# Patient Record
Sex: MALE | Race: WHITE | Employment: OTHER | URBAN - METROPOLITAN AREA
[De-identification: names, ages, dates, MRNs, and addresses within clinical notes are randomized per-mention and may not be internally consistent; named-entity substitution may affect disease eponyms.]

---

## 2017-07-17 ENCOUNTER — HOSPITAL ENCOUNTER (INPATIENT)
Age: 52
LOS: 9 days | Discharge: HOME HEALTH | DRG: 292 | End: 2017-07-26
Attending: EMERGENCY MEDICINE | Admitting: INTERNAL MEDICINE
Payer: MEDICARE

## 2017-07-17 ENCOUNTER — APPOINTMENT (OUTPATIENT)
Dept: GENERAL RADIOLOGY | Age: 52
DRG: 292 | End: 2017-07-17
Payer: MEDICARE

## 2017-07-17 DIAGNOSIS — R60.9 PERIPHERAL EDEMA: Primary | ICD-10-CM

## 2017-07-17 LAB
ABSOLUTE EOS #: 0.2 K/UL (ref 0–0.4)
ABSOLUTE LYMPH #: 0.7 K/UL (ref 1–4.8)
ABSOLUTE MONO #: 0.4 K/UL (ref 0.2–0.8)
ANION GAP SERPL CALCULATED.3IONS-SCNC: 13 MMOL/L (ref 9–17)
BASOPHILS # BLD: 0 %
BASOPHILS ABSOLUTE: 0 K/UL (ref 0–0.2)
BNP INTERPRETATION: ABNORMAL
BUN BLDV-MCNC: 27 MG/DL (ref 6–20)
BUN/CREAT BLD: 20 (ref 9–20)
CALCIUM SERPL-MCNC: 9.4 MG/DL (ref 8.6–10.4)
CHLORIDE BLD-SCNC: 87 MMOL/L (ref 98–107)
CO2: 30 MMOL/L (ref 20–31)
CREAT SERPL-MCNC: 1.37 MG/DL (ref 0.7–1.2)
DIFFERENTIAL TYPE: ABNORMAL
EOSINOPHILS RELATIVE PERCENT: 3 %
GFR AFRICAN AMERICAN: >60 ML/MIN
GFR NON-AFRICAN AMERICAN: 54 ML/MIN
GFR SERPL CREATININE-BSD FRML MDRD: ABNORMAL ML/MIN/{1.73_M2}
GFR SERPL CREATININE-BSD FRML MDRD: ABNORMAL ML/MIN/{1.73_M2}
GLUCOSE BLD-MCNC: 129 MG/DL (ref 70–99)
GLUCOSE BLD-MCNC: 135 MG/DL (ref 75–110)
GLUCOSE BLD-MCNC: 157 MG/DL (ref 75–110)
HCT VFR BLD CALC: 38.6 % (ref 41–53)
HEMOGLOBIN: 12.9 G/DL (ref 13.5–17.5)
LYMPHOCYTES # BLD: 10 %
MCH RBC QN AUTO: 30 PG (ref 26–34)
MCHC RBC AUTO-ENTMCNC: 33.5 G/DL (ref 31–37)
MCV RBC AUTO: 89.5 FL (ref 80–100)
MONOCYTES # BLD: 6 %
MYOGLOBIN: 55 NG/ML (ref 28–72)
MYOGLOBIN: 61 NG/ML (ref 28–72)
MYOGLOBIN: 84 NG/ML (ref 28–72)
PDW BLD-RTO: 14.5 % (ref 11.5–14.5)
PLATELET # BLD: 139 K/UL (ref 130–400)
PLATELET ESTIMATE: ABNORMAL
PMV BLD AUTO: 7.9 FL (ref 6–12)
POTASSIUM SERPL-SCNC: 4.5 MMOL/L (ref 3.7–5.3)
PRO-BNP: 475 PG/ML
RBC # BLD: 4.32 M/UL (ref 4.5–5.9)
RBC # BLD: ABNORMAL 10*6/UL
SEG NEUTROPHILS: 81 %
SEGMENTED NEUTROPHILS ABSOLUTE COUNT: 6 K/UL (ref 1.8–7.7)
SODIUM BLD-SCNC: 130 MMOL/L (ref 135–144)
TROPONIN INTERP: ABNORMAL
TROPONIN INTERP: NORMAL
TROPONIN INTERP: NORMAL
TROPONIN T: <0.03 NG/ML
WBC # BLD: 7.4 K/UL (ref 3.5–11)
WBC # BLD: ABNORMAL 10*3/UL

## 2017-07-17 PROCEDURE — 6360000002 HC RX W HCPCS: Performed by: EMERGENCY MEDICINE

## 2017-07-17 PROCEDURE — 36415 COLL VENOUS BLD VENIPUNCTURE: CPT

## 2017-07-17 PROCEDURE — 83874 ASSAY OF MYOGLOBIN: CPT

## 2017-07-17 PROCEDURE — 96375 TX/PRO/DX INJ NEW DRUG ADDON: CPT

## 2017-07-17 PROCEDURE — 2580000003 HC RX 258: Performed by: INTERNAL MEDICINE

## 2017-07-17 PROCEDURE — 82947 ASSAY GLUCOSE BLOOD QUANT: CPT

## 2017-07-17 PROCEDURE — 85025 COMPLETE CBC W/AUTO DIFF WBC: CPT

## 2017-07-17 PROCEDURE — 6370000000 HC RX 637 (ALT 250 FOR IP): Performed by: EMERGENCY MEDICINE

## 2017-07-17 PROCEDURE — 96374 THER/PROPH/DIAG INJ IV PUSH: CPT

## 2017-07-17 PROCEDURE — 83880 ASSAY OF NATRIURETIC PEPTIDE: CPT

## 2017-07-17 PROCEDURE — 93005 ELECTROCARDIOGRAM TRACING: CPT

## 2017-07-17 PROCEDURE — 84484 ASSAY OF TROPONIN QUANT: CPT

## 2017-07-17 PROCEDURE — 80048 BASIC METABOLIC PNL TOTAL CA: CPT

## 2017-07-17 PROCEDURE — 99285 EMERGENCY DEPT VISIT HI MDM: CPT

## 2017-07-17 PROCEDURE — 51702 INSERT TEMP BLADDER CATH: CPT

## 2017-07-17 PROCEDURE — 96376 TX/PRO/DX INJ SAME DRUG ADON: CPT

## 2017-07-17 PROCEDURE — 6360000002 HC RX W HCPCS: Performed by: INTERNAL MEDICINE

## 2017-07-17 PROCEDURE — 2060000000 HC ICU INTERMEDIATE R&B

## 2017-07-17 PROCEDURE — 96372 THER/PROPH/DIAG INJ SC/IM: CPT

## 2017-07-17 PROCEDURE — 6370000000 HC RX 637 (ALT 250 FOR IP): Performed by: INTERNAL MEDICINE

## 2017-07-17 PROCEDURE — 2500000003 HC RX 250 WO HCPCS: Performed by: NURSE PRACTITIONER

## 2017-07-17 PROCEDURE — 71010 XR CHEST PORTABLE: CPT

## 2017-07-17 RX ORDER — PANTOPRAZOLE SODIUM 40 MG/1
40 TABLET, DELAYED RELEASE ORAL
Status: DISCONTINUED | OUTPATIENT
Start: 2017-07-18 | End: 2017-07-17

## 2017-07-17 RX ORDER — POTASSIUM CHLORIDE 20 MEQ/1
40 TABLET, EXTENDED RELEASE ORAL PRN
Status: DISCONTINUED | OUTPATIENT
Start: 2017-07-17 | End: 2017-07-26 | Stop reason: HOSPADM

## 2017-07-17 RX ORDER — NADOLOL 40 MG/1
40 TABLET ORAL DAILY
Status: ON HOLD | COMMUNITY
End: 2021-10-29 | Stop reason: HOSPADM

## 2017-07-17 RX ORDER — CLONAZEPAM 0.5 MG/1
1 TABLET ORAL 2 TIMES DAILY PRN
Status: DISCONTINUED | OUTPATIENT
Start: 2017-07-17 | End: 2017-07-26 | Stop reason: HOSPADM

## 2017-07-17 RX ORDER — LANOLIN ALCOHOL/MO/W.PET/CERES
325 CREAM (GRAM) TOPICAL 2 TIMES DAILY WITH MEALS
Status: ON HOLD | COMMUNITY
End: 2017-07-26 | Stop reason: HOSPADM

## 2017-07-17 RX ORDER — BISACODYL 10 MG
10 SUPPOSITORY, RECTAL RECTAL DAILY PRN
Status: DISCONTINUED | OUTPATIENT
Start: 2017-07-17 | End: 2017-07-26 | Stop reason: HOSPADM

## 2017-07-17 RX ORDER — NITROGLYCERIN 0.4 MG/1
0.4 TABLET SUBLINGUAL EVERY 5 MIN PRN
Status: DISCONTINUED | OUTPATIENT
Start: 2017-07-17 | End: 2017-07-17 | Stop reason: SDUPTHER

## 2017-07-17 RX ORDER — NICOTINE POLACRILEX 4 MG
15 LOZENGE BUCCAL PRN
Status: DISCONTINUED | OUTPATIENT
Start: 2017-07-17 | End: 2017-07-26 | Stop reason: HOSPADM

## 2017-07-17 RX ORDER — LEVOTHYROXINE SODIUM 175 UG/1
175 TABLET ORAL DAILY
COMMUNITY

## 2017-07-17 RX ORDER — PANTOPRAZOLE SODIUM 40 MG/1
40 TABLET, DELAYED RELEASE ORAL 2 TIMES DAILY
Status: DISCONTINUED | OUTPATIENT
Start: 2017-07-17 | End: 2017-07-26 | Stop reason: HOSPADM

## 2017-07-17 RX ORDER — BUMETANIDE 1 MG/1
2 TABLET ORAL 2 TIMES DAILY
Status: ON HOLD | COMMUNITY
End: 2017-12-08

## 2017-07-17 RX ORDER — DOCUSATE SODIUM 100 MG/1
100 CAPSULE, LIQUID FILLED ORAL 2 TIMES DAILY
Status: DISCONTINUED | OUTPATIENT
Start: 2017-07-17 | End: 2017-07-26 | Stop reason: HOSPADM

## 2017-07-17 RX ORDER — MORPHINE SULFATE 4 MG/ML
4 INJECTION, SOLUTION INTRAMUSCULAR; INTRAVENOUS ONCE
Status: COMPLETED | OUTPATIENT
Start: 2017-07-17 | End: 2017-07-17

## 2017-07-17 RX ORDER — LANOLIN ALCOHOL/MO/W.PET/CERES
325 CREAM (GRAM) TOPICAL
Status: DISCONTINUED | OUTPATIENT
Start: 2017-07-18 | End: 2017-07-17

## 2017-07-17 RX ORDER — QUETIAPINE 150 MG/1
150 TABLET, FILM COATED, EXTENDED RELEASE ORAL NIGHTLY
Status: DISCONTINUED | OUTPATIENT
Start: 2017-07-17 | End: 2017-07-26 | Stop reason: HOSPADM

## 2017-07-17 RX ORDER — DULOXETIN HYDROCHLORIDE 60 MG/1
60 CAPSULE, DELAYED RELEASE ORAL DAILY
Status: DISCONTINUED | OUTPATIENT
Start: 2017-07-17 | End: 2017-07-26 | Stop reason: HOSPADM

## 2017-07-17 RX ORDER — SODIUM CHLORIDE 0.9 % (FLUSH) 0.9 %
10 SYRINGE (ML) INJECTION EVERY 12 HOURS SCHEDULED
Status: DISCONTINUED | OUTPATIENT
Start: 2017-07-17 | End: 2017-07-26 | Stop reason: HOSPADM

## 2017-07-17 RX ORDER — PREGABALIN 75 MG/1
150 CAPSULE ORAL 2 TIMES DAILY
Status: DISCONTINUED | OUTPATIENT
Start: 2017-07-17 | End: 2017-07-26 | Stop reason: HOSPADM

## 2017-07-17 RX ORDER — ONDANSETRON 2 MG/ML
4 INJECTION INTRAMUSCULAR; INTRAVENOUS EVERY 6 HOURS PRN
Status: DISCONTINUED | OUTPATIENT
Start: 2017-07-17 | End: 2017-07-26 | Stop reason: HOSPADM

## 2017-07-17 RX ORDER — FUROSEMIDE 10 MG/ML
40 INJECTION INTRAMUSCULAR; INTRAVENOUS ONCE
Status: COMPLETED | OUTPATIENT
Start: 2017-07-17 | End: 2017-07-17

## 2017-07-17 RX ORDER — DEXTROSE MONOHYDRATE 50 MG/ML
100 INJECTION, SOLUTION INTRAVENOUS PRN
Status: DISCONTINUED | OUTPATIENT
Start: 2017-07-17 | End: 2017-07-26 | Stop reason: HOSPADM

## 2017-07-17 RX ORDER — POTASSIUM CHLORIDE 20MEQ/15ML
40 LIQUID (ML) ORAL PRN
Status: DISCONTINUED | OUTPATIENT
Start: 2017-07-17 | End: 2017-07-26 | Stop reason: HOSPADM

## 2017-07-17 RX ORDER — ESOMEPRAZOLE MAGNESIUM 40 MG/1
40 FOR SUSPENSION ORAL DAILY
Status: ON HOLD | COMMUNITY
End: 2017-07-17 | Stop reason: CLARIF

## 2017-07-17 RX ORDER — PIOGLITAZONEHYDROCHLORIDE 30 MG/1
30 TABLET ORAL DAILY
Status: DISCONTINUED | OUTPATIENT
Start: 2017-07-17 | End: 2017-07-18

## 2017-07-17 RX ORDER — ACETAMINOPHEN 325 MG/1
650 TABLET ORAL EVERY 4 HOURS PRN
Status: DISCONTINUED | OUTPATIENT
Start: 2017-07-17 | End: 2017-07-26 | Stop reason: HOSPADM

## 2017-07-17 RX ORDER — OXYCODONE HYDROCHLORIDE 5 MG/1
10 TABLET ORAL EVERY 6 HOURS PRN
Status: DISCONTINUED | OUTPATIENT
Start: 2017-07-17 | End: 2017-07-26 | Stop reason: HOSPADM

## 2017-07-17 RX ORDER — LANOLIN ALCOHOL/MO/W.PET/CERES
325 CREAM (GRAM) TOPICAL 2 TIMES DAILY WITH MEALS
Status: DISCONTINUED | OUTPATIENT
Start: 2017-07-17 | End: 2017-07-26 | Stop reason: HOSPADM

## 2017-07-17 RX ORDER — SPIRONOLACTONE 25 MG/1
25 TABLET ORAL 2 TIMES DAILY
Status: DISCONTINUED | OUTPATIENT
Start: 2017-07-18 | End: 2017-07-26 | Stop reason: HOSPADM

## 2017-07-17 RX ORDER — SPIRONOLACTONE 50 MG/1
50 TABLET, FILM COATED ORAL 2 TIMES DAILY
Status: ON HOLD | COMMUNITY
End: 2017-12-08 | Stop reason: DRUGHIGH

## 2017-07-17 RX ORDER — MAGNESIUM SULFATE 1 G/100ML
1 INJECTION INTRAVENOUS PRN
Status: DISCONTINUED | OUTPATIENT
Start: 2017-07-17 | End: 2017-07-26 | Stop reason: HOSPADM

## 2017-07-17 RX ORDER — INSULIN GLARGINE 100 [IU]/ML
30 INJECTION, SOLUTION SUBCUTANEOUS DAILY
Status: DISCONTINUED | OUTPATIENT
Start: 2017-07-17 | End: 2017-07-26 | Stop reason: HOSPADM

## 2017-07-17 RX ORDER — POTASSIUM CHLORIDE 7.45 MG/ML
10 INJECTION INTRAVENOUS PRN
Status: DISCONTINUED | OUTPATIENT
Start: 2017-07-17 | End: 2017-07-26 | Stop reason: HOSPADM

## 2017-07-17 RX ORDER — ESOMEPRAZOLE MAGNESIUM 40 MG/1
40 FOR SUSPENSION ORAL DAILY
Status: DISCONTINUED | OUTPATIENT
Start: 2017-07-17 | End: 2017-07-17 | Stop reason: SDUPTHER

## 2017-07-17 RX ORDER — VENLAFAXINE HYDROCHLORIDE 75 MG/1
150 CAPSULE, EXTENDED RELEASE ORAL DAILY
Status: DISCONTINUED | OUTPATIENT
Start: 2017-07-17 | End: 2017-07-26 | Stop reason: HOSPADM

## 2017-07-17 RX ORDER — SODIUM CHLORIDE 0.9 % (FLUSH) 0.9 %
10 SYRINGE (ML) INJECTION PRN
Status: DISCONTINUED | OUTPATIENT
Start: 2017-07-17 | End: 2017-07-26 | Stop reason: HOSPADM

## 2017-07-17 RX ORDER — NYSTATIN 100000 [USP'U]/G
POWDER TOPICAL 2 TIMES DAILY
Status: ON HOLD | COMMUNITY
End: 2022-01-21 | Stop reason: HOSPADM

## 2017-07-17 RX ORDER — NITROGLYCERIN 0.4 MG/1
0.4 TABLET SUBLINGUAL EVERY 5 MIN PRN
Status: DISCONTINUED | OUTPATIENT
Start: 2017-07-17 | End: 2017-07-26 | Stop reason: HOSPADM

## 2017-07-17 RX ORDER — LEVOTHYROXINE SODIUM 137 UG/1
137 TABLET ORAL DAILY
Status: DISCONTINUED | OUTPATIENT
Start: 2017-07-17 | End: 2017-07-17

## 2017-07-17 RX ORDER — DEXTROSE MONOHYDRATE 25 G/50ML
12.5 INJECTION, SOLUTION INTRAVENOUS PRN
Status: DISCONTINUED | OUTPATIENT
Start: 2017-07-17 | End: 2017-07-26 | Stop reason: HOSPADM

## 2017-07-17 RX ORDER — ASPIRIN 81 MG/1
81 TABLET ORAL DAILY
Status: DISCONTINUED | OUTPATIENT
Start: 2017-07-17 | End: 2017-07-26 | Stop reason: HOSPADM

## 2017-07-17 RX ORDER — FUROSEMIDE 10 MG/ML
40 INJECTION INTRAMUSCULAR; INTRAVENOUS 2 TIMES DAILY
Status: DISCONTINUED | OUTPATIENT
Start: 2017-07-17 | End: 2017-07-19

## 2017-07-17 RX ORDER — TAMSULOSIN HYDROCHLORIDE 0.4 MG/1
0.4 CAPSULE ORAL DAILY
Status: DISCONTINUED | OUTPATIENT
Start: 2017-07-17 | End: 2017-07-26 | Stop reason: HOSPADM

## 2017-07-17 RX ORDER — LISINOPRIL 2.5 MG/1
2.5 TABLET ORAL DAILY
Status: DISCONTINUED | OUTPATIENT
Start: 2017-07-18 | End: 2017-07-17 | Stop reason: SINTOL

## 2017-07-17 RX ORDER — LEVOTHYROXINE SODIUM 0.15 MG/1
150 TABLET ORAL DAILY
Status: DISCONTINUED | OUTPATIENT
Start: 2017-07-18 | End: 2017-07-26 | Stop reason: HOSPADM

## 2017-07-17 RX ORDER — SPIRONOLACTONE 25 MG/1
25 TABLET ORAL DAILY
Status: DISCONTINUED | OUTPATIENT
Start: 2017-07-17 | End: 2017-07-17

## 2017-07-17 RX ORDER — PIOGLITAZONEHYDROCHLORIDE 30 MG/1
30 TABLET ORAL DAILY
Status: ON HOLD | COMMUNITY
End: 2017-07-26 | Stop reason: HOSPADM

## 2017-07-17 RX ORDER — NADOLOL 20 MG/1
40 TABLET ORAL DAILY
Status: DISCONTINUED | OUTPATIENT
Start: 2017-07-17 | End: 2017-07-23

## 2017-07-17 RX ORDER — HEPARIN SODIUM 5000 [USP'U]/ML
5000 INJECTION, SOLUTION INTRAVENOUS; SUBCUTANEOUS EVERY 8 HOURS SCHEDULED
Status: DISCONTINUED | OUTPATIENT
Start: 2017-07-17 | End: 2017-07-26 | Stop reason: HOSPADM

## 2017-07-17 RX ADMIN — PANTOPRAZOLE SODIUM 40 MG: 40 TABLET, DELAYED RELEASE ORAL at 21:27

## 2017-07-17 RX ADMIN — FUROSEMIDE 40 MG: 10 INJECTION, SOLUTION INTRAMUSCULAR; INTRAVENOUS at 21:27

## 2017-07-17 RX ADMIN — HEPARIN SODIUM 5000 UNITS: 5000 INJECTION, SOLUTION INTRAVENOUS; SUBCUTANEOUS at 13:37

## 2017-07-17 RX ADMIN — FUROSEMIDE 40 MG: 10 INJECTION, SOLUTION INTRAMUSCULAR; INTRAVENOUS at 16:05

## 2017-07-17 RX ADMIN — Medication 10 ML: at 21:29

## 2017-07-17 RX ADMIN — INSULIN GLARGINE 30 UNITS: 100 INJECTION, SOLUTION SUBCUTANEOUS at 15:10

## 2017-07-17 RX ADMIN — FUROSEMIDE 40 MG: 10 INJECTION, SOLUTION INTRAMUSCULAR; INTRAVENOUS at 12:00

## 2017-07-17 RX ADMIN — HEPARIN SODIUM 5000 UNITS: 5000 INJECTION, SOLUTION INTRAVENOUS; SUBCUTANEOUS at 21:43

## 2017-07-17 RX ADMIN — TAMSULOSIN HYDROCHLORIDE 0.4 MG: 0.4 CAPSULE ORAL at 21:28

## 2017-07-17 RX ADMIN — OXYCODONE HYDROCHLORIDE 10 MG: 5 TABLET ORAL at 15:09

## 2017-07-17 RX ADMIN — MORPHINE SULFATE 4 MG: 4 INJECTION, SOLUTION INTRAMUSCULAR; INTRAVENOUS at 12:01

## 2017-07-17 RX ADMIN — MICONAZORB AF ANTIFUNGAL POWDER: 1.42 POWDER TOPICAL at 21:27

## 2017-07-17 RX ADMIN — ACETAMINOPHEN 650 MG: 325 TABLET ORAL at 17:10

## 2017-07-17 RX ADMIN — LIDOCAINE HYDROCHLORIDE 5 ML: 20 JELLY TOPICAL at 12:01

## 2017-07-17 RX ADMIN — ASPIRIN 81 MG: 81 TABLET, COATED ORAL at 15:09

## 2017-07-17 RX ADMIN — QUETIAPINE 150 MG: 150 TABLET, EXTENDED RELEASE ORAL at 21:28

## 2017-07-17 RX ADMIN — VENLAFAXINE HYDROCHLORIDE 150 MG: 75 CAPSULE, EXTENDED RELEASE ORAL at 21:28

## 2017-07-17 RX ADMIN — MORPHINE SULFATE 4 MG: 4 INJECTION, SOLUTION INTRAMUSCULAR; INTRAVENOUS at 11:01

## 2017-07-17 RX ADMIN — PREGABALIN 150 MG: 75 CAPSULE ORAL at 15:11

## 2017-07-17 RX ADMIN — PREGABALIN 150 MG: 75 CAPSULE ORAL at 21:27

## 2017-07-17 ASSESSMENT — PAIN SCALES - GENERAL
PAINLEVEL_OUTOF10: 9
PAINLEVEL_OUTOF10: 7
PAINLEVEL_OUTOF10: 9
PAINLEVEL_OUTOF10: 8
PAINLEVEL_OUTOF10: 8
PAINLEVEL_OUTOF10: 9

## 2017-07-17 ASSESSMENT — PAIN DESCRIPTION - PROGRESSION
CLINICAL_PROGRESSION: GRADUALLY IMPROVING
CLINICAL_PROGRESSION: NOT CHANGED
CLINICAL_PROGRESSION: NOT CHANGED

## 2017-07-17 ASSESSMENT — PAIN DESCRIPTION - FREQUENCY
FREQUENCY: CONTINUOUS

## 2017-07-17 ASSESSMENT — PAIN DESCRIPTION - PAIN TYPE
TYPE: ACUTE PAIN;CHRONIC PAIN

## 2017-07-17 ASSESSMENT — ENCOUNTER SYMPTOMS
SHORTNESS OF BREATH: 1
ABDOMINAL PAIN: 0
EYE DISCHARGE: 0
EYE REDNESS: 0
COLOR CHANGE: 0
DIARRHEA: 0
FACIAL SWELLING: 0
VOMITING: 0
CONSTIPATION: 0
COUGH: 0

## 2017-07-17 ASSESSMENT — PAIN DESCRIPTION - LOCATION
LOCATION: LEG

## 2017-07-17 ASSESSMENT — PAIN DESCRIPTION - ORIENTATION
ORIENTATION: RIGHT;LEFT

## 2017-07-17 ASSESSMENT — PAIN DESCRIPTION - DESCRIPTORS
DESCRIPTORS: ACHING;PRESSURE
DESCRIPTORS: ACHING;PRESSURE;DISCOMFORT;SORE
DESCRIPTORS: ACHING;PRESSURE
DESCRIPTORS: ACHING;PRESSURE

## 2017-07-18 ENCOUNTER — APPOINTMENT (OUTPATIENT)
Dept: GENERAL RADIOLOGY | Age: 52
DRG: 292 | End: 2017-07-18
Payer: MEDICARE

## 2017-07-18 PROBLEM — I50.31 ACUTE DIASTOLIC (CONGESTIVE) HEART FAILURE (HCC): Status: ACTIVE | Noted: 2017-07-18

## 2017-07-18 LAB
ALBUMIN SERPL-MCNC: 3.3 G/DL (ref 3.5–5.2)
ALBUMIN/GLOBULIN RATIO: ABNORMAL (ref 1–2.5)
ALP BLD-CCNC: 114 U/L (ref 40–129)
ALT SERPL-CCNC: 12 U/L (ref 5–41)
AMMONIA: 27 UMOL/L (ref 16–60)
ANION GAP SERPL CALCULATED.3IONS-SCNC: 9 MMOL/L (ref 9–17)
AST SERPL-CCNC: 13 U/L
BILIRUB SERPL-MCNC: 0.36 MG/DL (ref 0.3–1.2)
BNP INTERPRETATION: ABNORMAL
BUN BLDV-MCNC: 24 MG/DL (ref 6–20)
BUN/CREAT BLD: 20 (ref 9–20)
CALCIUM SERPL-MCNC: 9.1 MG/DL (ref 8.6–10.4)
CHLORIDE BLD-SCNC: 92 MMOL/L (ref 98–107)
CHOLESTEROL/HDL RATIO: 4.4
CHOLESTEROL: 203 MG/DL
CO2: 37 MMOL/L (ref 20–31)
CREAT SERPL-MCNC: 1.22 MG/DL (ref 0.7–1.2)
EKG ATRIAL RATE: 63 BPM
EKG P AXIS: 54 DEGREES
EKG P-R INTERVAL: 172 MS
EKG Q-T INTERVAL: 418 MS
EKG QRS DURATION: 106 MS
EKG QTC CALCULATION (BAZETT): 427 MS
EKG R AXIS: -15 DEGREES
EKG T AXIS: 4 DEGREES
EKG VENTRICULAR RATE: 63 BPM
GFR AFRICAN AMERICAN: >60 ML/MIN
GFR NON-AFRICAN AMERICAN: >60 ML/MIN
GFR SERPL CREATININE-BSD FRML MDRD: ABNORMAL ML/MIN/{1.73_M2}
GFR SERPL CREATININE-BSD FRML MDRD: ABNORMAL ML/MIN/{1.73_M2}
GLUCOSE BLD-MCNC: 122 MG/DL (ref 75–110)
GLUCOSE BLD-MCNC: 124 MG/DL (ref 70–99)
GLUCOSE BLD-MCNC: 124 MG/DL (ref 75–110)
GLUCOSE BLD-MCNC: 148 MG/DL (ref 75–110)
GLUCOSE BLD-MCNC: 93 MG/DL (ref 75–110)
HCT VFR BLD CALC: 35.6 % (ref 41–53)
HDLC SERPL-MCNC: 46 MG/DL
HEMOGLOBIN: 11.9 G/DL (ref 13.5–17.5)
LDL CHOLESTEROL: 122 MG/DL (ref 0–130)
MAGNESIUM: 1.9 MG/DL (ref 1.6–2.6)
MCH RBC QN AUTO: 29.9 PG (ref 26–34)
MCHC RBC AUTO-ENTMCNC: 33.3 G/DL (ref 31–37)
MCV RBC AUTO: 89.7 FL (ref 80–100)
PDW BLD-RTO: 14.5 % (ref 11.5–14.5)
PLATELET # BLD: 115 K/UL (ref 130–400)
PMV BLD AUTO: 8 FL (ref 6–12)
POTASSIUM SERPL-SCNC: 4.2 MMOL/L (ref 3.7–5.3)
PRO-BNP: 683 PG/ML
RBC # BLD: 3.97 M/UL (ref 4.5–5.9)
SODIUM BLD-SCNC: 138 MMOL/L (ref 135–144)
TOTAL PROTEIN: 6.8 G/DL (ref 6.4–8.3)
TRIGL SERPL-MCNC: 174 MG/DL
TSH SERPL DL<=0.05 MIU/L-ACNC: 3.28 MIU/L (ref 0.3–5)
VLDLC SERPL CALC-MCNC: ABNORMAL MG/DL (ref 1–30)
WBC # BLD: 5.6 K/UL (ref 3.5–11)

## 2017-07-18 PROCEDURE — 99222 1ST HOSP IP/OBS MODERATE 55: CPT | Performed by: INTERNAL MEDICINE

## 2017-07-18 PROCEDURE — 82140 ASSAY OF AMMONIA: CPT

## 2017-07-18 PROCEDURE — 83880 ASSAY OF NATRIURETIC PEPTIDE: CPT

## 2017-07-18 PROCEDURE — 2580000003 HC RX 258: Performed by: INTERNAL MEDICINE

## 2017-07-18 PROCEDURE — 97530 THERAPEUTIC ACTIVITIES: CPT

## 2017-07-18 PROCEDURE — 97166 OT EVAL MOD COMPLEX 45 MIN: CPT

## 2017-07-18 PROCEDURE — 71010 XR CHEST PORTABLE: CPT

## 2017-07-18 PROCEDURE — 97162 PT EVAL MOD COMPLEX 30 MIN: CPT

## 2017-07-18 PROCEDURE — G8988 SELF CARE GOAL STATUS: HCPCS

## 2017-07-18 PROCEDURE — 82947 ASSAY GLUCOSE BLOOD QUANT: CPT

## 2017-07-18 PROCEDURE — G8978 MOBILITY CURRENT STATUS: HCPCS

## 2017-07-18 PROCEDURE — 6370000000 HC RX 637 (ALT 250 FOR IP): Performed by: INTERNAL MEDICINE

## 2017-07-18 PROCEDURE — 84443 ASSAY THYROID STIM HORMONE: CPT

## 2017-07-18 PROCEDURE — 2060000000 HC ICU INTERMEDIATE R&B

## 2017-07-18 PROCEDURE — 36415 COLL VENOUS BLD VENIPUNCTURE: CPT

## 2017-07-18 PROCEDURE — 80053 COMPREHEN METABOLIC PANEL: CPT

## 2017-07-18 PROCEDURE — 85027 COMPLETE CBC AUTOMATED: CPT

## 2017-07-18 PROCEDURE — 6360000002 HC RX W HCPCS: Performed by: INTERNAL MEDICINE

## 2017-07-18 PROCEDURE — G8987 SELF CARE CURRENT STATUS: HCPCS

## 2017-07-18 PROCEDURE — G8979 MOBILITY GOAL STATUS: HCPCS

## 2017-07-18 PROCEDURE — 83735 ASSAY OF MAGNESIUM: CPT

## 2017-07-18 PROCEDURE — 97535 SELF CARE MNGMENT TRAINING: CPT

## 2017-07-18 PROCEDURE — 97116 GAIT TRAINING THERAPY: CPT

## 2017-07-18 PROCEDURE — 80061 LIPID PANEL: CPT

## 2017-07-18 RX ORDER — LISINOPRIL 2.5 MG/1
2.5 TABLET ORAL DAILY
Status: DISCONTINUED | OUTPATIENT
Start: 2017-07-18 | End: 2017-07-24

## 2017-07-18 RX ORDER — DIPHENHYDRAMINE HCL 25 MG
25 TABLET ORAL EVERY 6 HOURS PRN
Status: DISCONTINUED | OUTPATIENT
Start: 2017-07-18 | End: 2017-07-26 | Stop reason: HOSPADM

## 2017-07-18 RX ADMIN — METFORMIN HYDROCHLORIDE 1000 MG: 500 TABLET, FILM COATED ORAL at 17:53

## 2017-07-18 RX ADMIN — QUETIAPINE 150 MG: 150 TABLET, EXTENDED RELEASE ORAL at 21:47

## 2017-07-18 RX ADMIN — MICONAZORB AF ANTIFUNGAL POWDER: 1.42 POWDER TOPICAL at 21:50

## 2017-07-18 RX ADMIN — LISINOPRIL 2.5 MG: 2.5 TABLET ORAL at 17:53

## 2017-07-18 RX ADMIN — HEPARIN SODIUM 5000 UNITS: 5000 INJECTION, SOLUTION INTRAVENOUS; SUBCUTANEOUS at 21:47

## 2017-07-18 RX ADMIN — INSULIN GLARGINE 30 UNITS: 100 INJECTION, SOLUTION SUBCUTANEOUS at 07:44

## 2017-07-18 RX ADMIN — PREGABALIN 150 MG: 75 CAPSULE ORAL at 08:05

## 2017-07-18 RX ADMIN — CLONAZEPAM 1 MG: 0.5 TABLET ORAL at 22:59

## 2017-07-18 RX ADMIN — VENLAFAXINE HYDROCHLORIDE 150 MG: 75 CAPSULE, EXTENDED RELEASE ORAL at 21:47

## 2017-07-18 RX ADMIN — NADOLOL 40 MG: 20 TABLET ORAL at 08:05

## 2017-07-18 RX ADMIN — FERROUS SULFATE TAB EC 325 MG (65 MG FE EQUIVALENT) 325 MG: 325 (65 FE) TABLET DELAYED RESPONSE at 17:53

## 2017-07-18 RX ADMIN — HEPARIN SODIUM 5000 UNITS: 5000 INJECTION, SOLUTION INTRAVENOUS; SUBCUTANEOUS at 06:03

## 2017-07-18 RX ADMIN — ACETAMINOPHEN 650 MG: 325 TABLET ORAL at 08:15

## 2017-07-18 RX ADMIN — SPIRONOLACTONE 25 MG: 25 TABLET, FILM COATED ORAL at 08:02

## 2017-07-18 RX ADMIN — ACETAMINOPHEN 650 MG: 325 TABLET ORAL at 17:53

## 2017-07-18 RX ADMIN — ASPIRIN 81 MG: 81 TABLET, COATED ORAL at 08:00

## 2017-07-18 RX ADMIN — Medication 10 ML: at 21:50

## 2017-07-18 RX ADMIN — DULOXETINE HYDROCHLORIDE 60 MG: 60 CAPSULE, DELAYED RELEASE ORAL at 08:05

## 2017-07-18 RX ADMIN — HEPARIN SODIUM 5000 UNITS: 5000 INJECTION, SOLUTION INTRAVENOUS; SUBCUTANEOUS at 13:44

## 2017-07-18 RX ADMIN — LEVOTHYROXINE SODIUM 150 MCG: 150 TABLET ORAL at 07:44

## 2017-07-18 RX ADMIN — DIPHENHYDRAMINE HCL 25 MG: 25 TABLET ORAL at 12:45

## 2017-07-18 RX ADMIN — METFORMIN HYDROCHLORIDE 1000 MG: 500 TABLET, FILM COATED ORAL at 08:00

## 2017-07-18 RX ADMIN — PANTOPRAZOLE SODIUM 40 MG: 40 TABLET, DELAYED RELEASE ORAL at 08:00

## 2017-07-18 RX ADMIN — PANTOPRAZOLE SODIUM 40 MG: 40 TABLET, DELAYED RELEASE ORAL at 21:47

## 2017-07-18 RX ADMIN — TAMSULOSIN HYDROCHLORIDE 0.4 MG: 0.4 CAPSULE ORAL at 21:46

## 2017-07-18 RX ADMIN — OXYCODONE HYDROCHLORIDE 10 MG: 5 TABLET ORAL at 06:11

## 2017-07-18 RX ADMIN — OXYCODONE HYDROCHLORIDE 10 MG: 5 TABLET ORAL at 12:44

## 2017-07-18 RX ADMIN — DIPHENHYDRAMINE HCL 25 MG: 25 TABLET ORAL at 20:15

## 2017-07-18 RX ADMIN — SPIRONOLACTONE 25 MG: 25 TABLET, FILM COATED ORAL at 17:53

## 2017-07-18 RX ADMIN — PIOGLITAZONE 30 MG: 30 TABLET ORAL at 08:05

## 2017-07-18 RX ADMIN — LINAGLIPTIN 5 MG: 5 TABLET, FILM COATED ORAL at 08:05

## 2017-07-18 RX ADMIN — PREGABALIN 150 MG: 75 CAPSULE ORAL at 21:46

## 2017-07-18 RX ADMIN — FERROUS SULFATE TAB EC 325 MG (65 MG FE EQUIVALENT) 325 MG: 325 (65 FE) TABLET DELAYED RESPONSE at 08:00

## 2017-07-18 RX ADMIN — FUROSEMIDE 40 MG: 10 INJECTION, SOLUTION INTRAMUSCULAR; INTRAVENOUS at 07:51

## 2017-07-18 RX ADMIN — CLONAZEPAM 1 MG: 0.5 TABLET ORAL at 08:12

## 2017-07-18 RX ADMIN — OXYCODONE HYDROCHLORIDE 10 MG: 5 TABLET ORAL at 18:43

## 2017-07-18 RX ADMIN — FUROSEMIDE 40 MG: 10 INJECTION, SOLUTION INTRAMUSCULAR; INTRAVENOUS at 21:48

## 2017-07-18 RX ADMIN — MICONAZORB AF ANTIFUNGAL POWDER: 1.42 POWDER TOPICAL at 11:07

## 2017-07-18 RX ADMIN — Medication 10 ML: at 07:52

## 2017-07-18 ASSESSMENT — PAIN DESCRIPTION - FREQUENCY
FREQUENCY: CONTINUOUS

## 2017-07-18 ASSESSMENT — PAIN DESCRIPTION - DESCRIPTORS
DESCRIPTORS: SORE;ACHING
DESCRIPTORS: ACHING;PRESSURE
DESCRIPTORS: ACHING;PRESSURE
DESCRIPTORS: ACHING
DESCRIPTORS: ACHING;SORE;PRESSURE
DESCRIPTORS: ACHING;PRESSURE

## 2017-07-18 ASSESSMENT — PAIN DESCRIPTION - ONSET
ONSET: ON-GOING
ONSET: PROGRESSIVE
ONSET: ON-GOING

## 2017-07-18 ASSESSMENT — PAIN DESCRIPTION - LOCATION
LOCATION: LEG
LOCATION: LEG
LOCATION: LEG;KNEE
LOCATION: LEG
LOCATION: KNEE
LOCATION: LEG
LOCATION: LEG

## 2017-07-18 ASSESSMENT — PAIN DESCRIPTION - ORIENTATION
ORIENTATION: RIGHT
ORIENTATION: LEFT;RIGHT
ORIENTATION: RIGHT;LEFT
ORIENTATION: LEFT;RIGHT
ORIENTATION: RIGHT;LEFT

## 2017-07-18 ASSESSMENT — PAIN DESCRIPTION - PAIN TYPE
TYPE: ACUTE PAIN;CHRONIC PAIN
TYPE: CHRONIC PAIN
TYPE: ACUTE PAIN;CHRONIC PAIN

## 2017-07-18 ASSESSMENT — PAIN DESCRIPTION - PROGRESSION
CLINICAL_PROGRESSION: NOT CHANGED
CLINICAL_PROGRESSION: GRADUALLY IMPROVING
CLINICAL_PROGRESSION: NOT CHANGED

## 2017-07-18 ASSESSMENT — PAIN SCALES - GENERAL
PAINLEVEL_OUTOF10: 9
PAINLEVEL_OUTOF10: 8
PAINLEVEL_OUTOF10: 9
PAINLEVEL_OUTOF10: 7
PAINLEVEL_OUTOF10: 10
PAINLEVEL_OUTOF10: 9
PAINLEVEL_OUTOF10: 9

## 2017-07-19 ENCOUNTER — ANESTHESIA EVENT (OUTPATIENT)
Dept: OPERATING ROOM | Age: 52
DRG: 292 | End: 2017-07-19
Payer: MEDICARE

## 2017-07-19 ENCOUNTER — APPOINTMENT (OUTPATIENT)
Dept: GENERAL RADIOLOGY | Age: 52
DRG: 292 | End: 2017-07-19
Payer: MEDICARE

## 2017-07-19 ENCOUNTER — ANESTHESIA (OUTPATIENT)
Dept: OPERATING ROOM | Age: 52
DRG: 292 | End: 2017-07-19
Payer: MEDICARE

## 2017-07-19 ENCOUNTER — APPOINTMENT (OUTPATIENT)
Dept: ULTRASOUND IMAGING | Age: 52
DRG: 292 | End: 2017-07-19
Payer: MEDICARE

## 2017-07-19 VITALS — SYSTOLIC BLOOD PRESSURE: 111 MMHG | OXYGEN SATURATION: 100 % | DIASTOLIC BLOOD PRESSURE: 54 MMHG

## 2017-07-19 LAB
ALBUMIN SERPL-MCNC: 3 G/DL (ref 3.5–5.2)
ALBUMIN/GLOBULIN RATIO: ABNORMAL (ref 1–2.5)
ALP BLD-CCNC: 94 U/L (ref 40–129)
ALT SERPL-CCNC: 9 U/L (ref 5–41)
ANION GAP SERPL CALCULATED.3IONS-SCNC: 7 MMOL/L (ref 9–17)
AST SERPL-CCNC: 11 U/L
BILIRUB SERPL-MCNC: 0.32 MG/DL (ref 0.3–1.2)
BILIRUBIN DIRECT: 0.1 MG/DL
BILIRUBIN, INDIRECT: 0.22 MG/DL (ref 0–1)
BNP INTERPRETATION: ABNORMAL
BUN BLDV-MCNC: 24 MG/DL (ref 6–20)
CALCIUM SERPL-MCNC: 9 MG/DL (ref 8.6–10.4)
CHLORIDE BLD-SCNC: 95 MMOL/L (ref 98–107)
CO2: 37 MMOL/L (ref 20–31)
CREAT SERPL-MCNC: 1.37 MG/DL (ref 0.7–1.2)
GFR AFRICAN AMERICAN: >60 ML/MIN
GFR NON-AFRICAN AMERICAN: 54 ML/MIN
GFR SERPL CREATININE-BSD FRML MDRD: ABNORMAL ML/MIN/{1.73_M2}
GFR SERPL CREATININE-BSD FRML MDRD: ABNORMAL ML/MIN/{1.73_M2}
GLUCOSE BLD-MCNC: 106 MG/DL (ref 75–110)
GLUCOSE BLD-MCNC: 111 MG/DL (ref 70–99)
GLUCOSE BLD-MCNC: 131 MG/DL (ref 75–110)
GLUCOSE BLD-MCNC: 72 MG/DL (ref 75–110)
GLUCOSE BLD-MCNC: 96 MG/DL (ref 75–110)
GLUCOSE BLD-MCNC: 97 MG/DL (ref 75–110)
HCT VFR BLD CALC: 33.1 % (ref 41–53)
HEMOGLOBIN: 10.9 G/DL (ref 13.5–17.5)
MAGNESIUM: 1.8 MG/DL (ref 1.6–2.6)
MCH RBC QN AUTO: 29.8 PG (ref 26–34)
MCHC RBC AUTO-ENTMCNC: 32.9 G/DL (ref 31–37)
MCV RBC AUTO: 90.5 FL (ref 80–100)
PDW BLD-RTO: 15.1 % (ref 11.5–14.5)
PLATELET # BLD: 120 K/UL (ref 130–400)
PMV BLD AUTO: 7.9 FL (ref 6–12)
POTASSIUM SERPL-SCNC: 4.1 MMOL/L (ref 3.7–5.3)
PRO-BNP: 1531 PG/ML
RBC # BLD: 3.66 M/UL (ref 4.5–5.9)
SODIUM BLD-SCNC: 139 MMOL/L (ref 135–144)
TOTAL PROTEIN: 6 G/DL (ref 6.4–8.3)
WBC # BLD: 5.6 K/UL (ref 3.5–11)

## 2017-07-19 PROCEDURE — 6370000000 HC RX 637 (ALT 250 FOR IP): Performed by: INTERNAL MEDICINE

## 2017-07-19 PROCEDURE — 6360000002 HC RX W HCPCS: Performed by: INTERNAL MEDICINE

## 2017-07-19 PROCEDURE — 82947 ASSAY GLUCOSE BLOOD QUANT: CPT

## 2017-07-19 PROCEDURE — 3700000001 HC ADD 15 MINUTES (ANESTHESIA): Performed by: INTERNAL MEDICINE

## 2017-07-19 PROCEDURE — 83735 ASSAY OF MAGNESIUM: CPT

## 2017-07-19 PROCEDURE — 94761 N-INVAS EAR/PLS OXIMETRY MLT: CPT

## 2017-07-19 PROCEDURE — 3609012400 HC EGD TRANSORAL BIOPSY SINGLE/MULTIPLE: Performed by: INTERNAL MEDICINE

## 2017-07-19 PROCEDURE — 73562 X-RAY EXAM OF KNEE 3: CPT

## 2017-07-19 PROCEDURE — 83880 ASSAY OF NATRIURETIC PEPTIDE: CPT

## 2017-07-19 PROCEDURE — 2500000003 HC RX 250 WO HCPCS: Performed by: NURSE ANESTHETIST, CERTIFIED REGISTERED

## 2017-07-19 PROCEDURE — 3700000000 HC ANESTHESIA ATTENDED CARE: Performed by: INTERNAL MEDICINE

## 2017-07-19 PROCEDURE — 97530 THERAPEUTIC ACTIVITIES: CPT

## 2017-07-19 PROCEDURE — 7100000011 HC PHASE II RECOVERY - ADDTL 15 MIN: Performed by: INTERNAL MEDICINE

## 2017-07-19 PROCEDURE — 7100000010 HC PHASE II RECOVERY - FIRST 15 MIN: Performed by: INTERNAL MEDICINE

## 2017-07-19 PROCEDURE — 99232 SBSQ HOSP IP/OBS MODERATE 35: CPT | Performed by: INTERNAL MEDICINE

## 2017-07-19 PROCEDURE — 2700000000 HC OXYGEN THERAPY PER DAY

## 2017-07-19 PROCEDURE — 43239 EGD BIOPSY SINGLE/MULTIPLE: CPT | Performed by: INTERNAL MEDICINE

## 2017-07-19 PROCEDURE — 80053 COMPREHEN METABOLIC PANEL: CPT

## 2017-07-19 PROCEDURE — 6360000002 HC RX W HCPCS: Performed by: NURSE ANESTHETIST, CERTIFIED REGISTERED

## 2017-07-19 PROCEDURE — 76705 ECHO EXAM OF ABDOMEN: CPT

## 2017-07-19 PROCEDURE — 0DB68ZX EXCISION OF STOMACH, VIA NATURAL OR ARTIFICIAL OPENING ENDOSCOPIC, DIAGNOSTIC: ICD-10-PCS | Performed by: INTERNAL MEDICINE

## 2017-07-19 PROCEDURE — 85027 COMPLETE CBC AUTOMATED: CPT

## 2017-07-19 PROCEDURE — 2580000003 HC RX 258: Performed by: NURSE ANESTHETIST, CERTIFIED REGISTERED

## 2017-07-19 PROCEDURE — 2000000000 HC ICU R&B

## 2017-07-19 PROCEDURE — 88305 TISSUE EXAM BY PATHOLOGIST: CPT

## 2017-07-19 PROCEDURE — 82248 BILIRUBIN DIRECT: CPT

## 2017-07-19 PROCEDURE — 97535 SELF CARE MNGMENT TRAINING: CPT

## 2017-07-19 PROCEDURE — 97110 THERAPEUTIC EXERCISES: CPT

## 2017-07-19 PROCEDURE — 2060000000 HC ICU INTERMEDIATE R&B

## 2017-07-19 PROCEDURE — 2580000003 HC RX 258: Performed by: INTERNAL MEDICINE

## 2017-07-19 PROCEDURE — 36415 COLL VENOUS BLD VENIPUNCTURE: CPT

## 2017-07-19 RX ORDER — PROPOFOL 10 MG/ML
INJECTION, EMULSION INTRAVENOUS PRN
Status: DISCONTINUED | OUTPATIENT
Start: 2017-07-19 | End: 2017-07-19 | Stop reason: SDUPTHER

## 2017-07-19 RX ORDER — FENTANYL CITRATE 50 UG/ML
25 INJECTION, SOLUTION INTRAMUSCULAR; INTRAVENOUS EVERY 5 MIN PRN
Status: DISCONTINUED | OUTPATIENT
Start: 2017-07-19 | End: 2017-07-19 | Stop reason: HOSPADM

## 2017-07-19 RX ORDER — ONDANSETRON 2 MG/ML
4 INJECTION INTRAMUSCULAR; INTRAVENOUS
Status: DISCONTINUED | OUTPATIENT
Start: 2017-07-19 | End: 2017-07-19 | Stop reason: HOSPADM

## 2017-07-19 RX ORDER — FUROSEMIDE 10 MG/ML
80 INJECTION INTRAMUSCULAR; INTRAVENOUS 2 TIMES DAILY
Status: DISCONTINUED | OUTPATIENT
Start: 2017-07-19 | End: 2017-07-23

## 2017-07-19 RX ORDER — DEXAMETHASONE SODIUM PHOSPHATE 4 MG/ML
4 INJECTION, SOLUTION INTRA-ARTICULAR; INTRALESIONAL; INTRAMUSCULAR; INTRAVENOUS; SOFT TISSUE
Status: DISCONTINUED | OUTPATIENT
Start: 2017-07-19 | End: 2017-07-19 | Stop reason: HOSPADM

## 2017-07-19 RX ORDER — SODIUM CHLORIDE 9 MG/ML
INJECTION, SOLUTION INTRAVENOUS CONTINUOUS PRN
Status: DISCONTINUED | OUTPATIENT
Start: 2017-07-19 | End: 2017-07-19 | Stop reason: SDUPTHER

## 2017-07-19 RX ORDER — DIPHENHYDRAMINE HYDROCHLORIDE 50 MG/ML
12.5 INJECTION INTRAMUSCULAR; INTRAVENOUS
Status: DISCONTINUED | OUTPATIENT
Start: 2017-07-19 | End: 2017-07-19 | Stop reason: HOSPADM

## 2017-07-19 RX ORDER — HYDRALAZINE HYDROCHLORIDE 20 MG/ML
5 INJECTION INTRAMUSCULAR; INTRAVENOUS EVERY 10 MIN PRN
Status: DISCONTINUED | OUTPATIENT
Start: 2017-07-19 | End: 2017-07-19 | Stop reason: HOSPADM

## 2017-07-19 RX ORDER — KETAMINE HYDROCHLORIDE 100 MG/ML
INJECTION, SOLUTION INTRAMUSCULAR; INTRAVENOUS PRN
Status: DISCONTINUED | OUTPATIENT
Start: 2017-07-19 | End: 2017-07-19 | Stop reason: SDUPTHER

## 2017-07-19 RX ORDER — MEPERIDINE HYDROCHLORIDE 25 MG/ML
12.5 INJECTION INTRAMUSCULAR; INTRAVENOUS; SUBCUTANEOUS EVERY 5 MIN PRN
Status: DISCONTINUED | OUTPATIENT
Start: 2017-07-19 | End: 2017-07-19 | Stop reason: HOSPADM

## 2017-07-19 RX ORDER — LABETALOL HYDROCHLORIDE 5 MG/ML
5 INJECTION, SOLUTION INTRAVENOUS EVERY 10 MIN PRN
Status: DISCONTINUED | OUTPATIENT
Start: 2017-07-19 | End: 2017-07-19 | Stop reason: HOSPADM

## 2017-07-19 RX ADMIN — CLONAZEPAM 1 MG: 0.5 TABLET ORAL at 21:56

## 2017-07-19 RX ADMIN — PROPOFOL 40 MG: 10 INJECTION, EMULSION INTRAVENOUS at 16:04

## 2017-07-19 RX ADMIN — Medication 10 ML: at 09:21

## 2017-07-19 RX ADMIN — PREGABALIN 150 MG: 75 CAPSULE ORAL at 09:21

## 2017-07-19 RX ADMIN — SODIUM CHLORIDE: 9 INJECTION, SOLUTION INTRAVENOUS at 15:56

## 2017-07-19 RX ADMIN — PANTOPRAZOLE SODIUM 40 MG: 40 TABLET, DELAYED RELEASE ORAL at 09:38

## 2017-07-19 RX ADMIN — Medication 10 ML: at 21:28

## 2017-07-19 RX ADMIN — FUROSEMIDE 80 MG: 10 INJECTION, SOLUTION INTRAMUSCULAR; INTRAVENOUS at 21:26

## 2017-07-19 RX ADMIN — HEPARIN SODIUM 5000 UNITS: 5000 INJECTION, SOLUTION INTRAVENOUS; SUBCUTANEOUS at 21:26

## 2017-07-19 RX ADMIN — TAMSULOSIN HYDROCHLORIDE 0.4 MG: 0.4 CAPSULE ORAL at 09:22

## 2017-07-19 RX ADMIN — OXYCODONE HYDROCHLORIDE 10 MG: 5 TABLET ORAL at 02:47

## 2017-07-19 RX ADMIN — VENLAFAXINE HYDROCHLORIDE 150 MG: 75 CAPSULE, EXTENDED RELEASE ORAL at 09:38

## 2017-07-19 RX ADMIN — PREGABALIN 150 MG: 75 CAPSULE ORAL at 21:25

## 2017-07-19 RX ADMIN — PROPOFOL 20 MG: 10 INJECTION, EMULSION INTRAVENOUS at 16:08

## 2017-07-19 RX ADMIN — METFORMIN HYDROCHLORIDE 1000 MG: 500 TABLET, FILM COATED ORAL at 18:23

## 2017-07-19 RX ADMIN — MICONAZORB AF ANTIFUNGAL POWDER: 1.42 POWDER TOPICAL at 09:30

## 2017-07-19 RX ADMIN — PANTOPRAZOLE SODIUM 40 MG: 40 TABLET, DELAYED RELEASE ORAL at 21:25

## 2017-07-19 RX ADMIN — SPIRONOLACTONE 25 MG: 25 TABLET, FILM COATED ORAL at 21:26

## 2017-07-19 RX ADMIN — OXYCODONE HYDROCHLORIDE 10 MG: 5 TABLET ORAL at 18:22

## 2017-07-19 RX ADMIN — DOCUSATE SODIUM 100 MG: 100 CAPSULE, LIQUID FILLED ORAL at 21:26

## 2017-07-19 RX ADMIN — LEVOTHYROXINE SODIUM 150 MCG: 150 TABLET ORAL at 09:22

## 2017-07-19 RX ADMIN — MICONAZORB AF ANTIFUNGAL POWDER: 1.42 POWDER TOPICAL at 21:27

## 2017-07-19 RX ADMIN — DIPHENHYDRAMINE HCL 25 MG: 25 TABLET ORAL at 20:03

## 2017-07-19 RX ADMIN — Medication 10 ML: at 09:11

## 2017-07-19 RX ADMIN — KETAMINE HYDROCHLORIDE 20 MG: 100 INJECTION INTRAMUSCULAR; INTRAVENOUS at 16:04

## 2017-07-19 RX ADMIN — KETAMINE HYDROCHLORIDE 10 MG: 100 INJECTION INTRAMUSCULAR; INTRAVENOUS at 16:10

## 2017-07-19 RX ADMIN — FUROSEMIDE 40 MG: 10 INJECTION, SOLUTION INTRAMUSCULAR; INTRAVENOUS at 09:21

## 2017-07-19 RX ADMIN — PROPOFOL 30 MG: 10 INJECTION, EMULSION INTRAVENOUS at 16:06

## 2017-07-19 RX ADMIN — KETAMINE HYDROCHLORIDE 20 MG: 100 INJECTION INTRAMUSCULAR; INTRAVENOUS at 16:07

## 2017-07-19 RX ADMIN — DULOXETINE HYDROCHLORIDE 60 MG: 60 CAPSULE, DELAYED RELEASE ORAL at 09:21

## 2017-07-19 RX ADMIN — OXYCODONE HYDROCHLORIDE 10 MG: 5 TABLET ORAL at 09:22

## 2017-07-19 RX ADMIN — DIPHENHYDRAMINE HCL 25 MG: 25 TABLET ORAL at 02:47

## 2017-07-19 RX ADMIN — CLONAZEPAM 1 MG: 0.5 TABLET ORAL at 09:22

## 2017-07-19 RX ADMIN — QUETIAPINE 150 MG: 150 TABLET, EXTENDED RELEASE ORAL at 21:26

## 2017-07-19 RX ADMIN — DIPHENHYDRAMINE HCL 25 MG: 25 TABLET ORAL at 11:34

## 2017-07-19 ASSESSMENT — PAIN DESCRIPTION - LOCATION
LOCATION: LEG
LOCATION: GENERALIZED
LOCATION: LEG
LOCATION: LEG

## 2017-07-19 ASSESSMENT — PAIN DESCRIPTION - PROGRESSION
CLINICAL_PROGRESSION: GRADUALLY WORSENING

## 2017-07-19 ASSESSMENT — PAIN SCALES - GENERAL
PAINLEVEL_OUTOF10: 8
PAINLEVEL_OUTOF10: 4
PAINLEVEL_OUTOF10: 4
PAINLEVEL_OUTOF10: 0
PAINLEVEL_OUTOF10: 3
PAINLEVEL_OUTOF10: 9
PAINLEVEL_OUTOF10: 9
PAINLEVEL_OUTOF10: 0
PAINLEVEL_OUTOF10: 4
PAINLEVEL_OUTOF10: 9
PAINLEVEL_OUTOF10: 0
PAINLEVEL_OUTOF10: 0

## 2017-07-19 ASSESSMENT — PAIN DESCRIPTION - ORIENTATION
ORIENTATION: RIGHT;LEFT

## 2017-07-19 ASSESSMENT — PAIN DESCRIPTION - ONSET
ONSET: PROGRESSIVE

## 2017-07-19 ASSESSMENT — PAIN DESCRIPTION - PAIN TYPE
TYPE: CHRONIC PAIN
TYPE: ACUTE PAIN
TYPE: CHRONIC PAIN

## 2017-07-19 ASSESSMENT — PAIN DESCRIPTION - FREQUENCY
FREQUENCY: INTERMITTENT
FREQUENCY: CONTINUOUS

## 2017-07-19 ASSESSMENT — PAIN DESCRIPTION - DESCRIPTORS
DESCRIPTORS: DISCOMFORT
DESCRIPTORS: ACHING;BURNING
DESCRIPTORS: DISCOMFORT

## 2017-07-20 PROBLEM — N17.9 AKI (ACUTE KIDNEY INJURY) (HCC): Status: ACTIVE | Noted: 2017-07-20

## 2017-07-20 LAB
ANION GAP SERPL CALCULATED.3IONS-SCNC: 8 MMOL/L (ref 9–17)
BNP INTERPRETATION: ABNORMAL
BUN BLDV-MCNC: 20 MG/DL (ref 6–20)
BUN/CREAT BLD: 18 (ref 9–20)
CALCIUM SERPL-MCNC: 9 MG/DL (ref 8.6–10.4)
CHLORIDE BLD-SCNC: 93 MMOL/L (ref 98–107)
CO2: 38 MMOL/L (ref 20–31)
CREAT SERPL-MCNC: 1.11 MG/DL (ref 0.7–1.2)
GFR AFRICAN AMERICAN: >60 ML/MIN
GFR NON-AFRICAN AMERICAN: >60 ML/MIN
GFR SERPL CREATININE-BSD FRML MDRD: ABNORMAL ML/MIN/{1.73_M2}
GFR SERPL CREATININE-BSD FRML MDRD: ABNORMAL ML/MIN/{1.73_M2}
GLUCOSE BLD-MCNC: 113 MG/DL (ref 70–99)
GLUCOSE BLD-MCNC: 114 MG/DL (ref 75–110)
GLUCOSE BLD-MCNC: 138 MG/DL (ref 75–110)
GLUCOSE BLD-MCNC: 185 MG/DL (ref 75–110)
HCT VFR BLD CALC: 34.8 % (ref 41–53)
HEMOGLOBIN: 11.4 G/DL (ref 13.5–17.5)
MAGNESIUM: 1.6 MG/DL (ref 1.6–2.6)
MCH RBC QN AUTO: 29.6 PG (ref 26–34)
MCHC RBC AUTO-ENTMCNC: 32.7 G/DL (ref 31–37)
MCV RBC AUTO: 90.5 FL (ref 80–100)
PDW BLD-RTO: 15.2 % (ref 11.5–14.5)
PLATELET # BLD: 130 K/UL (ref 130–400)
PMV BLD AUTO: 7.6 FL (ref 6–12)
POTASSIUM SERPL-SCNC: 4.3 MMOL/L (ref 3.7–5.3)
PRO-BNP: 1036 PG/ML
RBC # BLD: 3.84 M/UL (ref 4.5–5.9)
SODIUM BLD-SCNC: 139 MMOL/L (ref 135–144)
WBC # BLD: 6.1 K/UL (ref 3.5–11)

## 2017-07-20 PROCEDURE — 6370000000 HC RX 637 (ALT 250 FOR IP): Performed by: INTERNAL MEDICINE

## 2017-07-20 PROCEDURE — 36415 COLL VENOUS BLD VENIPUNCTURE: CPT

## 2017-07-20 PROCEDURE — 85027 COMPLETE CBC AUTOMATED: CPT

## 2017-07-20 PROCEDURE — 6360000002 HC RX W HCPCS: Performed by: INTERNAL MEDICINE

## 2017-07-20 PROCEDURE — 2000000000 HC ICU R&B

## 2017-07-20 PROCEDURE — 82947 ASSAY GLUCOSE BLOOD QUANT: CPT

## 2017-07-20 PROCEDURE — 99232 SBSQ HOSP IP/OBS MODERATE 35: CPT | Performed by: INTERNAL MEDICINE

## 2017-07-20 PROCEDURE — 83735 ASSAY OF MAGNESIUM: CPT

## 2017-07-20 PROCEDURE — 97530 THERAPEUTIC ACTIVITIES: CPT

## 2017-07-20 PROCEDURE — 80048 BASIC METABOLIC PNL TOTAL CA: CPT

## 2017-07-20 PROCEDURE — 83880 ASSAY OF NATRIURETIC PEPTIDE: CPT

## 2017-07-20 PROCEDURE — 97535 SELF CARE MNGMENT TRAINING: CPT

## 2017-07-20 PROCEDURE — 2580000003 HC RX 258: Performed by: INTERNAL MEDICINE

## 2017-07-20 RX ADMIN — OXYCODONE HYDROCHLORIDE 10 MG: 5 TABLET ORAL at 08:39

## 2017-07-20 RX ADMIN — HEPARIN SODIUM 5000 UNITS: 5000 INJECTION, SOLUTION INTRAVENOUS; SUBCUTANEOUS at 22:07

## 2017-07-20 RX ADMIN — LEVOTHYROXINE SODIUM 150 MCG: 150 TABLET ORAL at 08:27

## 2017-07-20 RX ADMIN — FUROSEMIDE 80 MG: 10 INJECTION, SOLUTION INTRAMUSCULAR; INTRAVENOUS at 22:07

## 2017-07-20 RX ADMIN — DULOXETINE HYDROCHLORIDE 60 MG: 60 CAPSULE, DELAYED RELEASE ORAL at 08:28

## 2017-07-20 RX ADMIN — MICONAZORB AF ANTIFUNGAL POWDER: 1.42 POWDER TOPICAL at 08:29

## 2017-07-20 RX ADMIN — PANTOPRAZOLE SODIUM 40 MG: 40 TABLET, DELAYED RELEASE ORAL at 08:39

## 2017-07-20 RX ADMIN — INSULIN LISPRO 2 UNITS: 100 INJECTION, SOLUTION INTRAVENOUS; SUBCUTANEOUS at 12:12

## 2017-07-20 RX ADMIN — PANTOPRAZOLE SODIUM 40 MG: 40 TABLET, DELAYED RELEASE ORAL at 22:07

## 2017-07-20 RX ADMIN — LISINOPRIL 2.5 MG: 2.5 TABLET ORAL at 08:39

## 2017-07-20 RX ADMIN — OXYCODONE HYDROCHLORIDE 10 MG: 5 TABLET ORAL at 14:40

## 2017-07-20 RX ADMIN — PREGABALIN 150 MG: 75 CAPSULE ORAL at 08:28

## 2017-07-20 RX ADMIN — VENLAFAXINE HYDROCHLORIDE 150 MG: 75 CAPSULE, EXTENDED RELEASE ORAL at 08:28

## 2017-07-20 RX ADMIN — OXYCODONE HYDROCHLORIDE 10 MG: 5 TABLET ORAL at 01:14

## 2017-07-20 RX ADMIN — HEPARIN SODIUM 5000 UNITS: 5000 INJECTION, SOLUTION INTRAVENOUS; SUBCUTANEOUS at 06:10

## 2017-07-20 RX ADMIN — MAGNESIUM SULFATE HEPTAHYDRATE 1 G: 1 INJECTION, SOLUTION INTRAVENOUS at 12:09

## 2017-07-20 RX ADMIN — FERROUS SULFATE TAB EC 325 MG (65 MG FE EQUIVALENT) 325 MG: 325 (65 FE) TABLET DELAYED RESPONSE at 17:05

## 2017-07-20 RX ADMIN — QUETIAPINE 150 MG: 150 TABLET, EXTENDED RELEASE ORAL at 22:07

## 2017-07-20 RX ADMIN — SPIRONOLACTONE 25 MG: 25 TABLET, FILM COATED ORAL at 17:04

## 2017-07-20 RX ADMIN — HEPARIN SODIUM 5000 UNITS: 5000 INJECTION, SOLUTION INTRAVENOUS; SUBCUTANEOUS at 14:38

## 2017-07-20 RX ADMIN — PREGABALIN 150 MG: 75 CAPSULE ORAL at 20:34

## 2017-07-20 RX ADMIN — TAMSULOSIN HYDROCHLORIDE 0.4 MG: 0.4 CAPSULE ORAL at 08:38

## 2017-07-20 RX ADMIN — DIPHENHYDRAMINE HCL 25 MG: 25 TABLET ORAL at 22:27

## 2017-07-20 RX ADMIN — Medication 10 ML: at 08:40

## 2017-07-20 RX ADMIN — LINAGLIPTIN 5 MG: 5 TABLET, FILM COATED ORAL at 08:27

## 2017-07-20 RX ADMIN — INSULIN GLARGINE 30 UNITS: 100 INJECTION, SOLUTION SUBCUTANEOUS at 08:54

## 2017-07-20 RX ADMIN — DOCUSATE SODIUM 100 MG: 100 CAPSULE, LIQUID FILLED ORAL at 08:40

## 2017-07-20 RX ADMIN — CLONAZEPAM 1 MG: 0.5 TABLET ORAL at 22:24

## 2017-07-20 RX ADMIN — METFORMIN HYDROCHLORIDE 1000 MG: 500 TABLET, FILM COATED ORAL at 17:05

## 2017-07-20 RX ADMIN — MAGNESIUM SULFATE HEPTAHYDRATE 1 G: 1 INJECTION, SOLUTION INTRAVENOUS at 14:38

## 2017-07-20 RX ADMIN — OXYCODONE HYDROCHLORIDE 10 MG: 5 TABLET ORAL at 22:24

## 2017-07-20 RX ADMIN — ACETAMINOPHEN 650 MG: 325 TABLET ORAL at 08:38

## 2017-07-20 RX ADMIN — FUROSEMIDE 80 MG: 10 INJECTION, SOLUTION INTRAMUSCULAR; INTRAVENOUS at 08:38

## 2017-07-20 RX ADMIN — CLONAZEPAM 1 MG: 0.5 TABLET ORAL at 08:48

## 2017-07-20 RX ADMIN — ASPIRIN 81 MG: 81 TABLET, COATED ORAL at 08:38

## 2017-07-20 RX ADMIN — Medication 10 ML: at 22:08

## 2017-07-20 RX ADMIN — NADOLOL 40 MG: 20 TABLET ORAL at 08:27

## 2017-07-20 RX ADMIN — FERROUS SULFATE TAB EC 325 MG (65 MG FE EQUIVALENT) 325 MG: 325 (65 FE) TABLET DELAYED RESPONSE at 08:39

## 2017-07-20 RX ADMIN — SPIRONOLACTONE 25 MG: 25 TABLET, FILM COATED ORAL at 08:40

## 2017-07-20 RX ADMIN — METFORMIN HYDROCHLORIDE 1000 MG: 500 TABLET, FILM COATED ORAL at 08:38

## 2017-07-20 RX ADMIN — MICONAZORB AF ANTIFUNGAL POWDER: 1.42 POWDER TOPICAL at 20:34

## 2017-07-20 ASSESSMENT — PAIN DESCRIPTION - LOCATION
LOCATION: GENERALIZED;LEG
LOCATION: LEG;FOOT
LOCATION: GENERALIZED;LEG

## 2017-07-20 ASSESSMENT — PAIN SCALES - GENERAL
PAINLEVEL_OUTOF10: 3
PAINLEVEL_OUTOF10: 9
PAINLEVEL_OUTOF10: 9
PAINLEVEL_OUTOF10: 2
PAINLEVEL_OUTOF10: 8
PAINLEVEL_OUTOF10: 9

## 2017-07-20 ASSESSMENT — PAIN DESCRIPTION - ORIENTATION: ORIENTATION: RIGHT;LEFT

## 2017-07-20 ASSESSMENT — PAIN DESCRIPTION - DESCRIPTORS
DESCRIPTORS: ACHING
DESCRIPTORS: ACHING

## 2017-07-20 ASSESSMENT — PAIN DESCRIPTION - PAIN TYPE
TYPE: ACUTE PAIN;CHRONIC PAIN
TYPE: ACUTE PAIN
TYPE: ACUTE PAIN;CHRONIC PAIN

## 2017-07-21 PROBLEM — E66.01 MORBID OBESITY WITH BMI OF 50.0-59.9, ADULT (HCC): Chronic | Status: ACTIVE | Noted: 2017-07-21

## 2017-07-21 PROBLEM — K76.6 PORTAL HYPERTENSION (HCC): Status: ACTIVE | Noted: 2017-07-21

## 2017-07-21 PROBLEM — I85.10 SECONDARY ESOPHAGEAL VARICES WITHOUT BLEEDING (HCC): Status: ACTIVE | Noted: 2017-07-21

## 2017-07-21 LAB
ANION GAP SERPL CALCULATED.3IONS-SCNC: 9 MMOL/L (ref 9–17)
BNP INTERPRETATION: ABNORMAL
BUN BLDV-MCNC: 24 MG/DL (ref 6–20)
BUN/CREAT BLD: 18 (ref 9–20)
CALCIUM SERPL-MCNC: 8.9 MG/DL (ref 8.6–10.4)
CHLORIDE BLD-SCNC: 92 MMOL/L (ref 98–107)
CO2: 37 MMOL/L (ref 20–31)
CREAT SERPL-MCNC: 1.33 MG/DL (ref 0.7–1.2)
GFR AFRICAN AMERICAN: >60 ML/MIN
GFR NON-AFRICAN AMERICAN: 56 ML/MIN
GFR SERPL CREATININE-BSD FRML MDRD: ABNORMAL ML/MIN/{1.73_M2}
GFR SERPL CREATININE-BSD FRML MDRD: ABNORMAL ML/MIN/{1.73_M2}
GLUCOSE BLD-MCNC: 110 MG/DL (ref 75–110)
GLUCOSE BLD-MCNC: 114 MG/DL (ref 70–99)
GLUCOSE BLD-MCNC: 120 MG/DL (ref 75–110)
GLUCOSE BLD-MCNC: 93 MG/DL (ref 75–110)
GLUCOSE BLD-MCNC: 98 MG/DL (ref 75–110)
HCT VFR BLD CALC: 34.8 % (ref 41–53)
HEMOGLOBIN: 11.6 G/DL (ref 13.5–17.5)
MAGNESIUM: 2 MG/DL (ref 1.6–2.6)
MCH RBC QN AUTO: 30.2 PG (ref 26–34)
MCHC RBC AUTO-ENTMCNC: 33.2 G/DL (ref 31–37)
MCV RBC AUTO: 90.7 FL (ref 80–100)
PDW BLD-RTO: 15.2 % (ref 11.5–14.5)
PLATELET # BLD: 126 K/UL (ref 130–400)
PMV BLD AUTO: 7.6 FL (ref 6–12)
POTASSIUM SERPL-SCNC: 4.3 MMOL/L (ref 3.7–5.3)
PRO-BNP: 566 PG/ML
RBC # BLD: 3.83 M/UL (ref 4.5–5.9)
SODIUM BLD-SCNC: 138 MMOL/L (ref 135–144)
SURGICAL PATHOLOGY REPORT: NORMAL
WBC # BLD: 6.6 K/UL (ref 3.5–11)

## 2017-07-21 PROCEDURE — 6360000002 HC RX W HCPCS: Performed by: INTERNAL MEDICINE

## 2017-07-21 PROCEDURE — 97110 THERAPEUTIC EXERCISES: CPT

## 2017-07-21 PROCEDURE — 97530 THERAPEUTIC ACTIVITIES: CPT

## 2017-07-21 PROCEDURE — 6370000000 HC RX 637 (ALT 250 FOR IP): Performed by: INTERNAL MEDICINE

## 2017-07-21 PROCEDURE — 2000000000 HC ICU R&B

## 2017-07-21 PROCEDURE — 99232 SBSQ HOSP IP/OBS MODERATE 35: CPT | Performed by: INTERNAL MEDICINE

## 2017-07-21 PROCEDURE — 83880 ASSAY OF NATRIURETIC PEPTIDE: CPT

## 2017-07-21 PROCEDURE — 83735 ASSAY OF MAGNESIUM: CPT

## 2017-07-21 PROCEDURE — 80048 BASIC METABOLIC PNL TOTAL CA: CPT

## 2017-07-21 PROCEDURE — 97116 GAIT TRAINING THERAPY: CPT

## 2017-07-21 PROCEDURE — 97535 SELF CARE MNGMENT TRAINING: CPT

## 2017-07-21 PROCEDURE — 2500000003 HC RX 250 WO HCPCS: Performed by: NURSE PRACTITIONER

## 2017-07-21 PROCEDURE — 85027 COMPLETE CBC AUTOMATED: CPT

## 2017-07-21 PROCEDURE — 82947 ASSAY GLUCOSE BLOOD QUANT: CPT

## 2017-07-21 PROCEDURE — 36415 COLL VENOUS BLD VENIPUNCTURE: CPT

## 2017-07-21 PROCEDURE — 2580000003 HC RX 258: Performed by: INTERNAL MEDICINE

## 2017-07-21 RX ORDER — CLOTRIMAZOLE 1 %
CREAM (GRAM) TOPICAL 2 TIMES DAILY
Status: DISCONTINUED | OUTPATIENT
Start: 2017-07-21 | End: 2017-07-26 | Stop reason: HOSPADM

## 2017-07-21 RX ORDER — METOLAZONE 2.5 MG/1
5 TABLET ORAL DAILY
Status: DISCONTINUED | OUTPATIENT
Start: 2017-07-21 | End: 2017-07-23

## 2017-07-21 RX ADMIN — DIPHENHYDRAMINE HCL 25 MG: 25 TABLET ORAL at 08:29

## 2017-07-21 RX ADMIN — NADOLOL 40 MG: 20 TABLET ORAL at 08:19

## 2017-07-21 RX ADMIN — OXYCODONE HYDROCHLORIDE 10 MG: 5 TABLET ORAL at 21:02

## 2017-07-21 RX ADMIN — ASPIRIN 81 MG: 81 TABLET, COATED ORAL at 08:19

## 2017-07-21 RX ADMIN — QUETIAPINE 150 MG: 150 TABLET, EXTENDED RELEASE ORAL at 21:38

## 2017-07-21 RX ADMIN — CLONAZEPAM 1 MG: 0.5 TABLET ORAL at 08:33

## 2017-07-21 RX ADMIN — PREGABALIN 150 MG: 75 CAPSULE ORAL at 08:19

## 2017-07-21 RX ADMIN — VENLAFAXINE HYDROCHLORIDE 150 MG: 75 CAPSULE, EXTENDED RELEASE ORAL at 08:19

## 2017-07-21 RX ADMIN — Medication 10 ML: at 08:18

## 2017-07-21 RX ADMIN — LISINOPRIL 2.5 MG: 2.5 TABLET ORAL at 08:19

## 2017-07-21 RX ADMIN — METFORMIN HYDROCHLORIDE 1000 MG: 500 TABLET, FILM COATED ORAL at 08:19

## 2017-07-21 RX ADMIN — METFORMIN HYDROCHLORIDE 1000 MG: 500 TABLET, FILM COATED ORAL at 17:35

## 2017-07-21 RX ADMIN — CLOTRIMAZOLE: 10 CREAM TOPICAL at 09:30

## 2017-07-21 RX ADMIN — Medication 10 ML: at 21:40

## 2017-07-21 RX ADMIN — LINAGLIPTIN 5 MG: 5 TABLET, FILM COATED ORAL at 08:19

## 2017-07-21 RX ADMIN — FERROUS SULFATE TAB EC 325 MG (65 MG FE EQUIVALENT) 325 MG: 325 (65 FE) TABLET DELAYED RESPONSE at 17:35

## 2017-07-21 RX ADMIN — SPIRONOLACTONE 25 MG: 25 TABLET, FILM COATED ORAL at 17:35

## 2017-07-21 RX ADMIN — CLONAZEPAM 1 MG: 0.5 TABLET ORAL at 21:36

## 2017-07-21 RX ADMIN — HEPARIN SODIUM 5000 UNITS: 5000 INJECTION, SOLUTION INTRAVENOUS; SUBCUTANEOUS at 21:41

## 2017-07-21 RX ADMIN — TAMSULOSIN HYDROCHLORIDE 0.4 MG: 0.4 CAPSULE ORAL at 08:19

## 2017-07-21 RX ADMIN — SPIRONOLACTONE 25 MG: 25 TABLET, FILM COATED ORAL at 08:19

## 2017-07-21 RX ADMIN — PREGABALIN 150 MG: 75 CAPSULE ORAL at 21:37

## 2017-07-21 RX ADMIN — OXYCODONE HYDROCHLORIDE 10 MG: 5 TABLET ORAL at 15:08

## 2017-07-21 RX ADMIN — INSULIN GLARGINE 30 UNITS: 100 INJECTION, SOLUTION SUBCUTANEOUS at 08:18

## 2017-07-21 RX ADMIN — MICONAZORB AF ANTIFUNGAL POWDER: 1.42 POWDER TOPICAL at 21:41

## 2017-07-21 RX ADMIN — FUROSEMIDE 80 MG: 10 INJECTION, SOLUTION INTRAMUSCULAR; INTRAVENOUS at 21:39

## 2017-07-21 RX ADMIN — DULOXETINE HYDROCHLORIDE 60 MG: 60 CAPSULE, DELAYED RELEASE ORAL at 08:19

## 2017-07-21 RX ADMIN — CLOTRIMAZOLE: 10 CREAM TOPICAL at 21:41

## 2017-07-21 RX ADMIN — FERROUS SULFATE TAB EC 325 MG (65 MG FE EQUIVALENT) 325 MG: 325 (65 FE) TABLET DELAYED RESPONSE at 08:19

## 2017-07-21 RX ADMIN — PANTOPRAZOLE SODIUM 40 MG: 40 TABLET, DELAYED RELEASE ORAL at 08:18

## 2017-07-21 RX ADMIN — LEVOTHYROXINE SODIUM 150 MCG: 150 TABLET ORAL at 06:00

## 2017-07-21 RX ADMIN — HEPARIN SODIUM 5000 UNITS: 5000 INJECTION, SOLUTION INTRAVENOUS; SUBCUTANEOUS at 15:08

## 2017-07-21 RX ADMIN — MICONAZORB AF ANTIFUNGAL POWDER: 1.42 POWDER TOPICAL at 08:18

## 2017-07-21 RX ADMIN — OXYCODONE HYDROCHLORIDE 10 MG: 5 TABLET ORAL at 08:18

## 2017-07-21 RX ADMIN — FUROSEMIDE 80 MG: 10 INJECTION, SOLUTION INTRAMUSCULAR; INTRAVENOUS at 08:19

## 2017-07-21 RX ADMIN — METOLAZONE 5 MG: 2.5 TABLET ORAL at 15:47

## 2017-07-21 RX ADMIN — HEPARIN SODIUM 5000 UNITS: 5000 INJECTION, SOLUTION INTRAVENOUS; SUBCUTANEOUS at 06:00

## 2017-07-21 RX ADMIN — PANTOPRAZOLE SODIUM 40 MG: 40 TABLET, DELAYED RELEASE ORAL at 21:37

## 2017-07-21 RX ADMIN — DIPHENHYDRAMINE HCL 25 MG: 25 TABLET ORAL at 21:02

## 2017-07-21 RX ADMIN — DOCUSATE SODIUM 100 MG: 100 CAPSULE, LIQUID FILLED ORAL at 21:35

## 2017-07-21 ASSESSMENT — PAIN DESCRIPTION - FREQUENCY
FREQUENCY: CONTINUOUS
FREQUENCY: CONTINUOUS

## 2017-07-21 ASSESSMENT — PAIN DESCRIPTION - PROGRESSION
CLINICAL_PROGRESSION: NOT CHANGED
CLINICAL_PROGRESSION: NOT CHANGED

## 2017-07-21 ASSESSMENT — PAIN SCALES - GENERAL
PAINLEVEL_OUTOF10: 9
PAINLEVEL_OUTOF10: 9
PAINLEVEL_OUTOF10: 0
PAINLEVEL_OUTOF10: 0
PAINLEVEL_OUTOF10: 9
PAINLEVEL_OUTOF10: 8

## 2017-07-21 ASSESSMENT — PAIN DESCRIPTION - ONSET
ONSET: ON-GOING
ONSET: PROGRESSIVE

## 2017-07-21 ASSESSMENT — PAIN DESCRIPTION - LOCATION
LOCATION: LEG
LOCATION: LEG

## 2017-07-21 ASSESSMENT — PAIN DESCRIPTION - DESCRIPTORS
DESCRIPTORS: ACHING;CONSTANT
DESCRIPTORS: ACHING;CONSTANT

## 2017-07-21 ASSESSMENT — PAIN DESCRIPTION - PAIN TYPE
TYPE: ACUTE PAIN
TYPE: CHRONIC PAIN
TYPE: CHRONIC PAIN

## 2017-07-21 ASSESSMENT — PAIN DESCRIPTION - ORIENTATION
ORIENTATION: RIGHT;LEFT
ORIENTATION: RIGHT;LEFT

## 2017-07-22 PROBLEM — E66.01 OBESITY, MORBID, BMI 50 OR HIGHER (HCC): Chronic | Status: RESOLVED | Noted: 2017-07-22 | Resolved: 2017-07-22

## 2017-07-22 PROBLEM — E66.01 OBESITY, MORBID, BMI 50 OR HIGHER (HCC): Chronic | Status: ACTIVE | Noted: 2017-07-22

## 2017-07-22 PROBLEM — K76.6 PORTAL HYPERTENSION (HCC): Chronic | Status: ACTIVE | Noted: 2017-07-21

## 2017-07-22 LAB
BNP INTERPRETATION: ABNORMAL
GLUCOSE BLD-MCNC: 104 MG/DL (ref 75–110)
GLUCOSE BLD-MCNC: 133 MG/DL (ref 75–110)
GLUCOSE BLD-MCNC: 89 MG/DL (ref 75–110)
GLUCOSE BLD-MCNC: 91 MG/DL (ref 75–110)
PRO-BNP: 374 PG/ML

## 2017-07-22 PROCEDURE — 2580000003 HC RX 258: Performed by: INTERNAL MEDICINE

## 2017-07-22 PROCEDURE — 6360000002 HC RX W HCPCS: Performed by: INTERNAL MEDICINE

## 2017-07-22 PROCEDURE — 6370000000 HC RX 637 (ALT 250 FOR IP): Performed by: INTERNAL MEDICINE

## 2017-07-22 PROCEDURE — 82947 ASSAY GLUCOSE BLOOD QUANT: CPT

## 2017-07-22 PROCEDURE — 97110 THERAPEUTIC EXERCISES: CPT

## 2017-07-22 PROCEDURE — 36415 COLL VENOUS BLD VENIPUNCTURE: CPT

## 2017-07-22 PROCEDURE — 97116 GAIT TRAINING THERAPY: CPT

## 2017-07-22 PROCEDURE — 99232 SBSQ HOSP IP/OBS MODERATE 35: CPT | Performed by: INTERNAL MEDICINE

## 2017-07-22 PROCEDURE — 2060000000 HC ICU INTERMEDIATE R&B

## 2017-07-22 PROCEDURE — 83880 ASSAY OF NATRIURETIC PEPTIDE: CPT

## 2017-07-22 RX ADMIN — QUETIAPINE 150 MG: 150 TABLET, EXTENDED RELEASE ORAL at 21:59

## 2017-07-22 RX ADMIN — HEPARIN SODIUM 5000 UNITS: 5000 INJECTION, SOLUTION INTRAVENOUS; SUBCUTANEOUS at 14:19

## 2017-07-22 RX ADMIN — PANTOPRAZOLE SODIUM 40 MG: 40 TABLET, DELAYED RELEASE ORAL at 21:38

## 2017-07-22 RX ADMIN — FERROUS SULFATE TAB EC 325 MG (65 MG FE EQUIVALENT) 325 MG: 325 (65 FE) TABLET DELAYED RESPONSE at 17:17

## 2017-07-22 RX ADMIN — Medication 10 ML: at 08:49

## 2017-07-22 RX ADMIN — DOCUSATE SODIUM 100 MG: 100 CAPSULE, LIQUID FILLED ORAL at 21:42

## 2017-07-22 RX ADMIN — SPIRONOLACTONE 25 MG: 25 TABLET, FILM COATED ORAL at 17:17

## 2017-07-22 RX ADMIN — DIPHENHYDRAMINE HCL 25 MG: 25 TABLET ORAL at 23:37

## 2017-07-22 RX ADMIN — DIPHENHYDRAMINE HCL 25 MG: 25 TABLET ORAL at 17:55

## 2017-07-22 RX ADMIN — DULOXETINE HYDROCHLORIDE 60 MG: 60 CAPSULE, DELAYED RELEASE ORAL at 09:01

## 2017-07-22 RX ADMIN — NADOLOL 40 MG: 20 TABLET ORAL at 08:59

## 2017-07-22 RX ADMIN — METOLAZONE 5 MG: 2.5 TABLET ORAL at 09:01

## 2017-07-22 RX ADMIN — PANTOPRAZOLE SODIUM 40 MG: 40 TABLET, DELAYED RELEASE ORAL at 08:49

## 2017-07-22 RX ADMIN — ASPIRIN 81 MG: 81 TABLET, COATED ORAL at 08:50

## 2017-07-22 RX ADMIN — VENLAFAXINE HYDROCHLORIDE 150 MG: 75 CAPSULE, EXTENDED RELEASE ORAL at 21:37

## 2017-07-22 RX ADMIN — Medication 10 ML: at 21:31

## 2017-07-22 RX ADMIN — FERROUS SULFATE TAB EC 325 MG (65 MG FE EQUIVALENT) 325 MG: 325 (65 FE) TABLET DELAYED RESPONSE at 08:50

## 2017-07-22 RX ADMIN — OXYCODONE HYDROCHLORIDE 10 MG: 5 TABLET ORAL at 10:47

## 2017-07-22 RX ADMIN — OXYCODONE HYDROCHLORIDE 10 MG: 5 TABLET ORAL at 04:44

## 2017-07-22 RX ADMIN — CLOTRIMAZOLE: 10 CREAM TOPICAL at 08:57

## 2017-07-22 RX ADMIN — CLOTRIMAZOLE: 10 CREAM TOPICAL at 21:39

## 2017-07-22 RX ADMIN — OXYCODONE HYDROCHLORIDE 10 MG: 5 TABLET ORAL at 23:34

## 2017-07-22 RX ADMIN — LEVOTHYROXINE SODIUM 150 MCG: 150 TABLET ORAL at 06:59

## 2017-07-22 RX ADMIN — PREGABALIN 150 MG: 75 CAPSULE ORAL at 21:38

## 2017-07-22 RX ADMIN — ACETAMINOPHEN 650 MG: 325 TABLET ORAL at 14:18

## 2017-07-22 RX ADMIN — CLONAZEPAM 1 MG: 0.5 TABLET ORAL at 21:38

## 2017-07-22 RX ADMIN — LISINOPRIL 2.5 MG: 2.5 TABLET ORAL at 08:50

## 2017-07-22 RX ADMIN — ACETAMINOPHEN 650 MG: 325 TABLET ORAL at 08:49

## 2017-07-22 RX ADMIN — PREGABALIN 150 MG: 75 CAPSULE ORAL at 09:01

## 2017-07-22 RX ADMIN — FUROSEMIDE 80 MG: 10 INJECTION, SOLUTION INTRAMUSCULAR; INTRAVENOUS at 21:39

## 2017-07-22 RX ADMIN — MICONAZORB AF ANTIFUNGAL POWDER: 1.42 POWDER TOPICAL at 08:57

## 2017-07-22 RX ADMIN — CLONAZEPAM 1 MG: 0.5 TABLET ORAL at 08:56

## 2017-07-22 RX ADMIN — OXYCODONE HYDROCHLORIDE 10 MG: 5 TABLET ORAL at 17:16

## 2017-07-22 RX ADMIN — HEPARIN SODIUM 5000 UNITS: 5000 INJECTION, SOLUTION INTRAVENOUS; SUBCUTANEOUS at 06:57

## 2017-07-22 RX ADMIN — INSULIN GLARGINE 30 UNITS: 100 INJECTION, SOLUTION SUBCUTANEOUS at 08:53

## 2017-07-22 RX ADMIN — TAMSULOSIN HYDROCHLORIDE 0.4 MG: 0.4 CAPSULE ORAL at 21:37

## 2017-07-22 RX ADMIN — METFORMIN HYDROCHLORIDE 1000 MG: 500 TABLET, FILM COATED ORAL at 08:49

## 2017-07-22 RX ADMIN — SPIRONOLACTONE 25 MG: 25 TABLET, FILM COATED ORAL at 08:50

## 2017-07-22 RX ADMIN — LINAGLIPTIN 5 MG: 5 TABLET, FILM COATED ORAL at 08:59

## 2017-07-22 RX ADMIN — FUROSEMIDE 80 MG: 10 INJECTION, SOLUTION INTRAMUSCULAR; INTRAVENOUS at 08:49

## 2017-07-22 RX ADMIN — HEPARIN SODIUM 5000 UNITS: 5000 INJECTION, SOLUTION INTRAVENOUS; SUBCUTANEOUS at 21:39

## 2017-07-22 ASSESSMENT — PAIN DESCRIPTION - LOCATION
LOCATION: LEG
LOCATION: LEG
LOCATION: LEG;FOOT
LOCATION: LEG

## 2017-07-22 ASSESSMENT — PAIN DESCRIPTION - PROGRESSION
CLINICAL_PROGRESSION: NOT CHANGED
CLINICAL_PROGRESSION: GRADUALLY IMPROVING
CLINICAL_PROGRESSION: NOT CHANGED
CLINICAL_PROGRESSION: GRADUALLY IMPROVING

## 2017-07-22 ASSESSMENT — PAIN SCALES - GENERAL
PAINLEVEL_OUTOF10: 3
PAINLEVEL_OUTOF10: 9
PAINLEVEL_OUTOF10: 9
PAINLEVEL_OUTOF10: 8
PAINLEVEL_OUTOF10: 9
PAINLEVEL_OUTOF10: 8
PAINLEVEL_OUTOF10: 9
PAINLEVEL_OUTOF10: 8
PAINLEVEL_OUTOF10: 7
PAINLEVEL_OUTOF10: 7

## 2017-07-22 ASSESSMENT — PAIN DESCRIPTION - DESCRIPTORS
DESCRIPTORS: ACHING;CONSTANT;DISCOMFORT
DESCRIPTORS: ACHING
DESCRIPTORS: ACHING;CONSTANT;DISCOMFORT
DESCRIPTORS: ACHING;CONSTANT
DESCRIPTORS: ACHING;CONSTANT
DESCRIPTORS: ACHING;CONSTANT;DISCOMFORT
DESCRIPTORS: ACHING;DISCOMFORT;CONSTANT

## 2017-07-22 ASSESSMENT — PAIN DESCRIPTION - ONSET
ONSET: ON-GOING

## 2017-07-22 ASSESSMENT — PAIN DESCRIPTION - PAIN TYPE
TYPE: CHRONIC PAIN

## 2017-07-22 ASSESSMENT — PAIN DESCRIPTION - FREQUENCY
FREQUENCY: CONTINUOUS

## 2017-07-22 ASSESSMENT — PAIN DESCRIPTION - ORIENTATION
ORIENTATION: RIGHT;LEFT
ORIENTATION: LEFT;RIGHT
ORIENTATION: RIGHT;LEFT
ORIENTATION: LEFT;RIGHT
ORIENTATION: RIGHT;LEFT

## 2017-07-23 LAB
BNP INTERPRETATION: ABNORMAL
GLUCOSE BLD-MCNC: 117 MG/DL (ref 75–110)
GLUCOSE BLD-MCNC: 121 MG/DL (ref 75–110)
GLUCOSE BLD-MCNC: 148 MG/DL (ref 75–110)
GLUCOSE BLD-MCNC: 96 MG/DL (ref 75–110)
PRO-BNP: 374 PG/ML

## 2017-07-23 PROCEDURE — 6360000002 HC RX W HCPCS: Performed by: INTERNAL MEDICINE

## 2017-07-23 PROCEDURE — 2580000003 HC RX 258: Performed by: INTERNAL MEDICINE

## 2017-07-23 PROCEDURE — 36415 COLL VENOUS BLD VENIPUNCTURE: CPT

## 2017-07-23 PROCEDURE — 94760 N-INVAS EAR/PLS OXIMETRY 1: CPT

## 2017-07-23 PROCEDURE — 2060000000 HC ICU INTERMEDIATE R&B

## 2017-07-23 PROCEDURE — 6370000000 HC RX 637 (ALT 250 FOR IP): Performed by: INTERNAL MEDICINE

## 2017-07-23 PROCEDURE — 2700000000 HC OXYGEN THERAPY PER DAY

## 2017-07-23 PROCEDURE — 83880 ASSAY OF NATRIURETIC PEPTIDE: CPT

## 2017-07-23 PROCEDURE — 82947 ASSAY GLUCOSE BLOOD QUANT: CPT

## 2017-07-23 PROCEDURE — 99232 SBSQ HOSP IP/OBS MODERATE 35: CPT | Performed by: INTERNAL MEDICINE

## 2017-07-23 RX ORDER — FUROSEMIDE 80 MG
80 TABLET ORAL 2 TIMES DAILY
Status: DISCONTINUED | OUTPATIENT
Start: 2017-07-23 | End: 2017-07-24

## 2017-07-23 RX ORDER — METOLAZONE 2.5 MG/1
2.5 TABLET ORAL DAILY
Status: DISCONTINUED | OUTPATIENT
Start: 2017-07-24 | End: 2017-07-26 | Stop reason: HOSPADM

## 2017-07-23 RX ADMIN — CLOTRIMAZOLE: 10 CREAM TOPICAL at 08:54

## 2017-07-23 RX ADMIN — PREGABALIN 150 MG: 75 CAPSULE ORAL at 08:55

## 2017-07-23 RX ADMIN — LISINOPRIL 2.5 MG: 2.5 TABLET ORAL at 08:55

## 2017-07-23 RX ADMIN — DIPHENHYDRAMINE HCL 25 MG: 25 TABLET ORAL at 05:40

## 2017-07-23 RX ADMIN — PANTOPRAZOLE SODIUM 40 MG: 40 TABLET, DELAYED RELEASE ORAL at 21:35

## 2017-07-23 RX ADMIN — INSULIN GLARGINE 30 UNITS: 100 INJECTION, SOLUTION SUBCUTANEOUS at 09:03

## 2017-07-23 RX ADMIN — HEPARIN SODIUM 5000 UNITS: 5000 INJECTION, SOLUTION INTRAVENOUS; SUBCUTANEOUS at 21:35

## 2017-07-23 RX ADMIN — TAMSULOSIN HYDROCHLORIDE 0.4 MG: 0.4 CAPSULE ORAL at 08:55

## 2017-07-23 RX ADMIN — NADOLOL 40 MG: 20 TABLET ORAL at 08:55

## 2017-07-23 RX ADMIN — ASPIRIN 81 MG: 81 TABLET, COATED ORAL at 08:55

## 2017-07-23 RX ADMIN — Medication 10 ML: at 09:04

## 2017-07-23 RX ADMIN — QUETIAPINE 150 MG: 150 TABLET, EXTENDED RELEASE ORAL at 21:35

## 2017-07-23 RX ADMIN — CLONAZEPAM 1 MG: 0.5 TABLET ORAL at 21:44

## 2017-07-23 RX ADMIN — INSULIN LISPRO 2 UNITS: 100 INJECTION, SOLUTION INTRAVENOUS; SUBCUTANEOUS at 14:18

## 2017-07-23 RX ADMIN — OXYCODONE HYDROCHLORIDE 10 MG: 5 TABLET ORAL at 17:51

## 2017-07-23 RX ADMIN — FERROUS SULFATE TAB EC 325 MG (65 MG FE EQUIVALENT) 325 MG: 325 (65 FE) TABLET DELAYED RESPONSE at 17:39

## 2017-07-23 RX ADMIN — FUROSEMIDE 80 MG: 10 INJECTION, SOLUTION INTRAMUSCULAR; INTRAVENOUS at 09:21

## 2017-07-23 RX ADMIN — PREGABALIN 150 MG: 75 CAPSULE ORAL at 21:35

## 2017-07-23 RX ADMIN — OXYCODONE HYDROCHLORIDE 10 MG: 5 TABLET ORAL at 11:40

## 2017-07-23 RX ADMIN — CLONAZEPAM 1 MG: 0.5 TABLET ORAL at 09:04

## 2017-07-23 RX ADMIN — CLOTRIMAZOLE: 10 CREAM TOPICAL at 21:35

## 2017-07-23 RX ADMIN — FERROUS SULFATE TAB EC 325 MG (65 MG FE EQUIVALENT) 325 MG: 325 (65 FE) TABLET DELAYED RESPONSE at 08:55

## 2017-07-23 RX ADMIN — DIPHENHYDRAMINE HCL 25 MG: 25 TABLET ORAL at 11:40

## 2017-07-23 RX ADMIN — HEPARIN SODIUM 5000 UNITS: 5000 INJECTION, SOLUTION INTRAVENOUS; SUBCUTANEOUS at 05:40

## 2017-07-23 RX ADMIN — PANTOPRAZOLE SODIUM 40 MG: 40 TABLET, DELAYED RELEASE ORAL at 08:55

## 2017-07-23 RX ADMIN — VENLAFAXINE HYDROCHLORIDE 150 MG: 75 CAPSULE, EXTENDED RELEASE ORAL at 08:54

## 2017-07-23 RX ADMIN — Medication 10 ML: at 21:35

## 2017-07-23 RX ADMIN — OXYCODONE HYDROCHLORIDE 10 MG: 5 TABLET ORAL at 05:40

## 2017-07-23 RX ADMIN — LINAGLIPTIN 5 MG: 5 TABLET, FILM COATED ORAL at 08:55

## 2017-07-23 RX ADMIN — LEVOTHYROXINE SODIUM 150 MCG: 150 TABLET ORAL at 06:16

## 2017-07-23 RX ADMIN — DIPHENHYDRAMINE HCL 25 MG: 25 TABLET ORAL at 17:50

## 2017-07-23 RX ADMIN — SPIRONOLACTONE 25 MG: 25 TABLET, FILM COATED ORAL at 17:39

## 2017-07-23 RX ADMIN — METOLAZONE 5 MG: 2.5 TABLET ORAL at 08:55

## 2017-07-23 RX ADMIN — DULOXETINE HYDROCHLORIDE 60 MG: 60 CAPSULE, DELAYED RELEASE ORAL at 08:55

## 2017-07-23 RX ADMIN — HEPARIN SODIUM 5000 UNITS: 5000 INJECTION, SOLUTION INTRAVENOUS; SUBCUTANEOUS at 14:18

## 2017-07-23 RX ADMIN — FUROSEMIDE 80 MG: 80 TABLET ORAL at 17:39

## 2017-07-23 RX ADMIN — SPIRONOLACTONE 25 MG: 25 TABLET, FILM COATED ORAL at 08:55

## 2017-07-23 ASSESSMENT — PAIN DESCRIPTION - ONSET
ONSET: ON-GOING

## 2017-07-23 ASSESSMENT — PAIN DESCRIPTION - FREQUENCY
FREQUENCY: CONTINUOUS
FREQUENCY: INTERMITTENT
FREQUENCY: CONTINUOUS

## 2017-07-23 ASSESSMENT — PAIN DESCRIPTION - ORIENTATION
ORIENTATION: RIGHT;LEFT

## 2017-07-23 ASSESSMENT — PAIN DESCRIPTION - DESCRIPTORS
DESCRIPTORS: ACHING;SORE
DESCRIPTORS: ACHING;DISCOMFORT
DESCRIPTORS: ACHING;DISCOMFORT

## 2017-07-23 ASSESSMENT — PAIN SCALES - GENERAL
PAINLEVEL_OUTOF10: 8
PAINLEVEL_OUTOF10: 8
PAINLEVEL_OUTOF10: 3
PAINLEVEL_OUTOF10: 1
PAINLEVEL_OUTOF10: 8

## 2017-07-23 ASSESSMENT — PAIN DESCRIPTION - PROGRESSION
CLINICAL_PROGRESSION: NOT CHANGED
CLINICAL_PROGRESSION: GRADUALLY WORSENING
CLINICAL_PROGRESSION: GRADUALLY WORSENING

## 2017-07-23 ASSESSMENT — PAIN DESCRIPTION - LOCATION
LOCATION: LEG
LOCATION: LEG;KNEE
LOCATION: LEG

## 2017-07-23 ASSESSMENT — PAIN DESCRIPTION - PAIN TYPE
TYPE: CHRONIC PAIN
TYPE: CHRONIC PAIN

## 2017-07-24 ENCOUNTER — APPOINTMENT (OUTPATIENT)
Dept: GENERAL RADIOLOGY | Age: 52
DRG: 292 | End: 2017-07-24
Payer: MEDICARE

## 2017-07-24 LAB
ANION GAP SERPL CALCULATED.3IONS-SCNC: 14 MMOL/L (ref 9–17)
BNP INTERPRETATION: ABNORMAL
BUN BLDV-MCNC: 42 MG/DL (ref 6–20)
BUN/CREAT BLD: 14 (ref 9–20)
CALCIUM SERPL-MCNC: 9.1 MG/DL (ref 8.6–10.4)
CHLORIDE BLD-SCNC: 84 MMOL/L (ref 98–107)
CO2: 34 MMOL/L (ref 20–31)
CREAT SERPL-MCNC: 2.92 MG/DL (ref 0.7–1.2)
GFR AFRICAN AMERICAN: 28 ML/MIN
GFR NON-AFRICAN AMERICAN: 23 ML/MIN
GFR SERPL CREATININE-BSD FRML MDRD: ABNORMAL ML/MIN/{1.73_M2}
GFR SERPL CREATININE-BSD FRML MDRD: ABNORMAL ML/MIN/{1.73_M2}
GLUCOSE BLD-MCNC: 103 MG/DL (ref 75–110)
GLUCOSE BLD-MCNC: 104 MG/DL (ref 75–110)
GLUCOSE BLD-MCNC: 104 MG/DL (ref 75–110)
GLUCOSE BLD-MCNC: 158 MG/DL (ref 75–110)
GLUCOSE BLD-MCNC: 86 MG/DL (ref 75–110)
GLUCOSE BLD-MCNC: 94 MG/DL (ref 70–99)
HCT VFR BLD CALC: 33.8 % (ref 41–53)
HEMOGLOBIN: 11.2 G/DL (ref 13.5–17.5)
MAGNESIUM: 1.8 MG/DL (ref 1.6–2.6)
MCH RBC QN AUTO: 29.8 PG (ref 26–34)
MCHC RBC AUTO-ENTMCNC: 33.1 G/DL (ref 31–37)
MCV RBC AUTO: 90 FL (ref 80–100)
PDW BLD-RTO: 14.9 % (ref 11.5–14.5)
PLATELET # BLD: 129 K/UL (ref 130–400)
PMV BLD AUTO: 8.6 FL (ref 6–12)
POTASSIUM SERPL-SCNC: 4.2 MMOL/L (ref 3.7–5.3)
PRO-BNP: 328 PG/ML
RBC # BLD: 3.75 M/UL (ref 4.5–5.9)
SODIUM BLD-SCNC: 132 MMOL/L (ref 135–144)
WBC # BLD: 7 K/UL (ref 3.5–11)

## 2017-07-24 PROCEDURE — 80048 BASIC METABOLIC PNL TOTAL CA: CPT

## 2017-07-24 PROCEDURE — 2580000003 HC RX 258: Performed by: INTERNAL MEDICINE

## 2017-07-24 PROCEDURE — 6370000000 HC RX 637 (ALT 250 FOR IP): Performed by: INTERNAL MEDICINE

## 2017-07-24 PROCEDURE — 97116 GAIT TRAINING THERAPY: CPT

## 2017-07-24 PROCEDURE — 99232 SBSQ HOSP IP/OBS MODERATE 35: CPT | Performed by: INTERNAL MEDICINE

## 2017-07-24 PROCEDURE — 97110 THERAPEUTIC EXERCISES: CPT

## 2017-07-24 PROCEDURE — 36415 COLL VENOUS BLD VENIPUNCTURE: CPT

## 2017-07-24 PROCEDURE — 97535 SELF CARE MNGMENT TRAINING: CPT | Performed by: NURSE PRACTITIONER

## 2017-07-24 PROCEDURE — 85027 COMPLETE CBC AUTOMATED: CPT

## 2017-07-24 PROCEDURE — 6360000002 HC RX W HCPCS: Performed by: INTERNAL MEDICINE

## 2017-07-24 PROCEDURE — 83735 ASSAY OF MAGNESIUM: CPT

## 2017-07-24 PROCEDURE — 2060000000 HC ICU INTERMEDIATE R&B

## 2017-07-24 PROCEDURE — 82947 ASSAY GLUCOSE BLOOD QUANT: CPT

## 2017-07-24 PROCEDURE — 97530 THERAPEUTIC ACTIVITIES: CPT

## 2017-07-24 PROCEDURE — 83880 ASSAY OF NATRIURETIC PEPTIDE: CPT

## 2017-07-24 PROCEDURE — 97530 THERAPEUTIC ACTIVITIES: CPT | Performed by: NURSE PRACTITIONER

## 2017-07-24 RX ADMIN — DIPHENHYDRAMINE HCL 25 MG: 25 TABLET ORAL at 18:58

## 2017-07-24 RX ADMIN — LINAGLIPTIN 5 MG: 5 TABLET, FILM COATED ORAL at 08:31

## 2017-07-24 RX ADMIN — QUETIAPINE 150 MG: 150 TABLET, EXTENDED RELEASE ORAL at 21:23

## 2017-07-24 RX ADMIN — DIPHENHYDRAMINE HCL 25 MG: 25 TABLET ORAL at 00:07

## 2017-07-24 RX ADMIN — Medication 10 ML: at 21:23

## 2017-07-24 RX ADMIN — DIPHENHYDRAMINE HCL 25 MG: 25 TABLET ORAL at 08:25

## 2017-07-24 RX ADMIN — METOLAZONE 2.5 MG: 2.5 TABLET ORAL at 08:32

## 2017-07-24 RX ADMIN — CLONAZEPAM 1 MG: 0.5 TABLET ORAL at 08:48

## 2017-07-24 RX ADMIN — VENLAFAXINE HYDROCHLORIDE 150 MG: 75 CAPSULE, EXTENDED RELEASE ORAL at 08:34

## 2017-07-24 RX ADMIN — OXYCODONE HYDROCHLORIDE 10 MG: 5 TABLET ORAL at 18:58

## 2017-07-24 RX ADMIN — ACETAMINOPHEN 650 MG: 325 TABLET ORAL at 01:49

## 2017-07-24 RX ADMIN — INSULIN GLARGINE 30 UNITS: 100 INJECTION, SOLUTION SUBCUTANEOUS at 08:36

## 2017-07-24 RX ADMIN — HEPARIN SODIUM 5000 UNITS: 5000 INJECTION, SOLUTION INTRAVENOUS; SUBCUTANEOUS at 21:30

## 2017-07-24 RX ADMIN — PREGABALIN 150 MG: 75 CAPSULE ORAL at 09:17

## 2017-07-24 RX ADMIN — PANTOPRAZOLE SODIUM 40 MG: 40 TABLET, DELAYED RELEASE ORAL at 21:23

## 2017-07-24 RX ADMIN — SPIRONOLACTONE 25 MG: 25 TABLET, FILM COATED ORAL at 08:34

## 2017-07-24 RX ADMIN — OXYCODONE HYDROCHLORIDE 10 MG: 5 TABLET ORAL at 00:03

## 2017-07-24 RX ADMIN — LEVOTHYROXINE SODIUM 150 MCG: 150 TABLET ORAL at 06:04

## 2017-07-24 RX ADMIN — ACETAMINOPHEN 650 MG: 325 TABLET ORAL at 21:43

## 2017-07-24 RX ADMIN — DULOXETINE HYDROCHLORIDE 60 MG: 60 CAPSULE, DELAYED RELEASE ORAL at 08:28

## 2017-07-24 RX ADMIN — CLOTRIMAZOLE: 10 CREAM TOPICAL at 08:27

## 2017-07-24 RX ADMIN — SPIRONOLACTONE 25 MG: 25 TABLET, FILM COATED ORAL at 18:58

## 2017-07-24 RX ADMIN — PANTOPRAZOLE SODIUM 40 MG: 40 TABLET, DELAYED RELEASE ORAL at 08:26

## 2017-07-24 RX ADMIN — ASPIRIN 81 MG: 81 TABLET, COATED ORAL at 08:25

## 2017-07-24 RX ADMIN — Medication 10 ML: at 08:35

## 2017-07-24 RX ADMIN — FERROUS SULFATE TAB EC 325 MG (65 MG FE EQUIVALENT) 325 MG: 325 (65 FE) TABLET DELAYED RESPONSE at 18:59

## 2017-07-24 RX ADMIN — CLOTRIMAZOLE: 10 CREAM TOPICAL at 21:23

## 2017-07-24 RX ADMIN — LISINOPRIL 2.5 MG: 2.5 TABLET ORAL at 08:31

## 2017-07-24 RX ADMIN — FERROUS SULFATE TAB EC 325 MG (65 MG FE EQUIVALENT) 325 MG: 325 (65 FE) TABLET DELAYED RESPONSE at 08:30

## 2017-07-24 RX ADMIN — CLONAZEPAM 1 MG: 0.5 TABLET ORAL at 21:23

## 2017-07-24 RX ADMIN — OXYCODONE HYDROCHLORIDE 10 MG: 5 TABLET ORAL at 12:58

## 2017-07-24 RX ADMIN — TAMSULOSIN HYDROCHLORIDE 0.4 MG: 0.4 CAPSULE ORAL at 08:34

## 2017-07-24 RX ADMIN — FUROSEMIDE 80 MG: 80 TABLET ORAL at 08:26

## 2017-07-24 RX ADMIN — INSULIN LISPRO 1 UNITS: 100 INJECTION, SOLUTION INTRAVENOUS; SUBCUTANEOUS at 21:24

## 2017-07-24 RX ADMIN — HEPARIN SODIUM 5000 UNITS: 5000 INJECTION, SOLUTION INTRAVENOUS; SUBCUTANEOUS at 06:04

## 2017-07-24 RX ADMIN — OXYCODONE HYDROCHLORIDE 10 MG: 5 TABLET ORAL at 06:11

## 2017-07-24 RX ADMIN — PREGABALIN 150 MG: 75 CAPSULE ORAL at 21:23

## 2017-07-24 ASSESSMENT — PAIN SCALES - GENERAL
PAINLEVEL_OUTOF10: 9
PAINLEVEL_OUTOF10: 7
PAINLEVEL_OUTOF10: 8
PAINLEVEL_OUTOF10: 8
PAINLEVEL_OUTOF10: 9
PAINLEVEL_OUTOF10: 3
PAINLEVEL_OUTOF10: 7
PAINLEVEL_OUTOF10: 0
PAINLEVEL_OUTOF10: 9
PAINLEVEL_OUTOF10: 6
PAINLEVEL_OUTOF10: 7
PAINLEVEL_OUTOF10: 0

## 2017-07-24 ASSESSMENT — PAIN DESCRIPTION - FREQUENCY
FREQUENCY: INTERMITTENT

## 2017-07-24 ASSESSMENT — PAIN DESCRIPTION - LOCATION
LOCATION: LEG
LOCATION: KNEE;LEG
LOCATION: LEG;KNEE
LOCATION: FOOT;LEG;KNEE
LOCATION: KNEE

## 2017-07-24 ASSESSMENT — PAIN DESCRIPTION - PAIN TYPE
TYPE: CHRONIC PAIN

## 2017-07-24 ASSESSMENT — PAIN DESCRIPTION - PROGRESSION
CLINICAL_PROGRESSION: NOT CHANGED

## 2017-07-24 ASSESSMENT — PAIN DESCRIPTION - DESCRIPTORS
DESCRIPTORS: ACHING
DESCRIPTORS: ACHING;THROBBING
DESCRIPTORS: ACHING;SORE;THROBBING

## 2017-07-24 ASSESSMENT — PAIN DESCRIPTION - ONSET
ONSET: ON-GOING
ONSET: ON-GOING
ONSET: GRADUAL

## 2017-07-24 ASSESSMENT — PAIN DESCRIPTION - ORIENTATION
ORIENTATION: RIGHT;LEFT
ORIENTATION: RIGHT

## 2017-07-25 LAB
ANION GAP SERPL CALCULATED.3IONS-SCNC: 12 MMOL/L (ref 9–17)
BNP INTERPRETATION: ABNORMAL
BUN BLDV-MCNC: 46 MG/DL (ref 6–20)
BUN/CREAT BLD: 21 (ref 9–20)
CALCIUM SERPL-MCNC: 9.1 MG/DL (ref 8.6–10.4)
CHLORIDE BLD-SCNC: 85 MMOL/L (ref 98–107)
CO2: 37 MMOL/L (ref 20–31)
CREAT SERPL-MCNC: 2.22 MG/DL (ref 0.7–1.2)
GFR AFRICAN AMERICAN: 38 ML/MIN
GFR NON-AFRICAN AMERICAN: 31 ML/MIN
GFR SERPL CREATININE-BSD FRML MDRD: ABNORMAL ML/MIN/{1.73_M2}
GFR SERPL CREATININE-BSD FRML MDRD: ABNORMAL ML/MIN/{1.73_M2}
GLUCOSE BLD-MCNC: 109 MG/DL (ref 75–110)
GLUCOSE BLD-MCNC: 116 MG/DL (ref 75–110)
GLUCOSE BLD-MCNC: 127 MG/DL (ref 75–110)
GLUCOSE BLD-MCNC: 145 MG/DL (ref 70–99)
GLUCOSE BLD-MCNC: 167 MG/DL (ref 75–110)
HCT VFR BLD CALC: 33.7 % (ref 41–53)
HEMOGLOBIN: 11.2 G/DL (ref 13.5–17.5)
MCH RBC QN AUTO: 30.1 PG (ref 26–34)
MCHC RBC AUTO-ENTMCNC: 33.3 G/DL (ref 31–37)
MCV RBC AUTO: 90.2 FL (ref 80–100)
PDW BLD-RTO: 14.8 % (ref 11.5–14.5)
PLATELET # BLD: 121 K/UL (ref 130–400)
PMV BLD AUTO: 8.7 FL (ref 6–12)
POTASSIUM SERPL-SCNC: 4.8 MMOL/L (ref 3.7–5.3)
PRO-BNP: 347 PG/ML
RBC # BLD: 3.73 M/UL (ref 4.5–5.9)
SODIUM BLD-SCNC: 134 MMOL/L (ref 135–144)
WBC # BLD: 6.2 K/UL (ref 3.5–11)

## 2017-07-25 PROCEDURE — 6370000000 HC RX 637 (ALT 250 FOR IP): Performed by: INTERNAL MEDICINE

## 2017-07-25 PROCEDURE — 97535 SELF CARE MNGMENT TRAINING: CPT | Performed by: NURSE PRACTITIONER

## 2017-07-25 PROCEDURE — 97110 THERAPEUTIC EXERCISES: CPT

## 2017-07-25 PROCEDURE — 97530 THERAPEUTIC ACTIVITIES: CPT

## 2017-07-25 PROCEDURE — 83880 ASSAY OF NATRIURETIC PEPTIDE: CPT

## 2017-07-25 PROCEDURE — 36415 COLL VENOUS BLD VENIPUNCTURE: CPT

## 2017-07-25 PROCEDURE — 82947 ASSAY GLUCOSE BLOOD QUANT: CPT

## 2017-07-25 PROCEDURE — 6360000002 HC RX W HCPCS: Performed by: INTERNAL MEDICINE

## 2017-07-25 PROCEDURE — 6370000000 HC RX 637 (ALT 250 FOR IP): Performed by: HOSPITALIST

## 2017-07-25 PROCEDURE — 99232 SBSQ HOSP IP/OBS MODERATE 35: CPT | Performed by: HOSPITALIST

## 2017-07-25 PROCEDURE — 85027 COMPLETE CBC AUTOMATED: CPT

## 2017-07-25 PROCEDURE — 97530 THERAPEUTIC ACTIVITIES: CPT | Performed by: NURSE PRACTITIONER

## 2017-07-25 PROCEDURE — 2580000003 HC RX 258: Performed by: INTERNAL MEDICINE

## 2017-07-25 PROCEDURE — 80048 BASIC METABOLIC PNL TOTAL CA: CPT

## 2017-07-25 PROCEDURE — 2060000000 HC ICU INTERMEDIATE R&B

## 2017-07-25 PROCEDURE — 97116 GAIT TRAINING THERAPY: CPT

## 2017-07-25 RX ORDER — FUROSEMIDE 40 MG/1
40 TABLET ORAL DAILY
Status: DISCONTINUED | OUTPATIENT
Start: 2017-07-25 | End: 2017-07-26 | Stop reason: HOSPADM

## 2017-07-25 RX ADMIN — FERROUS SULFATE TAB EC 325 MG (65 MG FE EQUIVALENT) 325 MG: 325 (65 FE) TABLET DELAYED RESPONSE at 08:51

## 2017-07-25 RX ADMIN — HEPARIN SODIUM 5000 UNITS: 5000 INJECTION, SOLUTION INTRAVENOUS; SUBCUTANEOUS at 06:06

## 2017-07-25 RX ADMIN — METOLAZONE 2.5 MG: 2.5 TABLET ORAL at 08:50

## 2017-07-25 RX ADMIN — DIPHENHYDRAMINE HCL 25 MG: 25 TABLET ORAL at 19:20

## 2017-07-25 RX ADMIN — FERROUS SULFATE TAB EC 325 MG (65 MG FE EQUIVALENT) 325 MG: 325 (65 FE) TABLET DELAYED RESPONSE at 19:19

## 2017-07-25 RX ADMIN — FUROSEMIDE 40 MG: 40 TABLET ORAL at 13:29

## 2017-07-25 RX ADMIN — HEPARIN SODIUM 5000 UNITS: 5000 INJECTION, SOLUTION INTRAVENOUS; SUBCUTANEOUS at 21:22

## 2017-07-25 RX ADMIN — OXYCODONE HYDROCHLORIDE 10 MG: 5 TABLET ORAL at 13:28

## 2017-07-25 RX ADMIN — Medication 10 ML: at 21:21

## 2017-07-25 RX ADMIN — PANTOPRAZOLE SODIUM 40 MG: 40 TABLET, DELAYED RELEASE ORAL at 08:51

## 2017-07-25 RX ADMIN — LEVOTHYROXINE SODIUM 150 MCG: 150 TABLET ORAL at 06:06

## 2017-07-25 RX ADMIN — PREGABALIN 150 MG: 75 CAPSULE ORAL at 21:21

## 2017-07-25 RX ADMIN — HEPARIN SODIUM 5000 UNITS: 5000 INJECTION, SOLUTION INTRAVENOUS; SUBCUTANEOUS at 15:55

## 2017-07-25 RX ADMIN — VENLAFAXINE HYDROCHLORIDE 150 MG: 75 CAPSULE, EXTENDED RELEASE ORAL at 08:50

## 2017-07-25 RX ADMIN — DIPHENHYDRAMINE HCL 25 MG: 25 TABLET ORAL at 07:06

## 2017-07-25 RX ADMIN — PREGABALIN 150 MG: 75 CAPSULE ORAL at 08:50

## 2017-07-25 RX ADMIN — Medication 10 ML: at 08:51

## 2017-07-25 RX ADMIN — INSULIN GLARGINE 30 UNITS: 100 INJECTION, SOLUTION SUBCUTANEOUS at 08:52

## 2017-07-25 RX ADMIN — SPIRONOLACTONE 25 MG: 25 TABLET, FILM COATED ORAL at 08:50

## 2017-07-25 RX ADMIN — ASPIRIN 81 MG: 81 TABLET, COATED ORAL at 08:51

## 2017-07-25 RX ADMIN — DIPHENHYDRAMINE HCL 25 MG: 25 TABLET ORAL at 00:54

## 2017-07-25 RX ADMIN — DULOXETINE HYDROCHLORIDE 60 MG: 60 CAPSULE, DELAYED RELEASE ORAL at 08:51

## 2017-07-25 RX ADMIN — CLOTRIMAZOLE: 10 CREAM TOPICAL at 21:21

## 2017-07-25 RX ADMIN — CLOTRIMAZOLE: 10 CREAM TOPICAL at 08:51

## 2017-07-25 RX ADMIN — SPIRONOLACTONE 25 MG: 25 TABLET, FILM COATED ORAL at 19:20

## 2017-07-25 RX ADMIN — OXYCODONE HYDROCHLORIDE 10 MG: 5 TABLET ORAL at 19:19

## 2017-07-25 RX ADMIN — OXYCODONE HYDROCHLORIDE 10 MG: 5 TABLET ORAL at 00:54

## 2017-07-25 RX ADMIN — LINAGLIPTIN 5 MG: 5 TABLET, FILM COATED ORAL at 08:51

## 2017-07-25 RX ADMIN — DIPHENHYDRAMINE HCL 25 MG: 25 TABLET ORAL at 13:29

## 2017-07-25 RX ADMIN — CLONAZEPAM 1 MG: 0.5 TABLET ORAL at 08:58

## 2017-07-25 RX ADMIN — PANTOPRAZOLE SODIUM 40 MG: 40 TABLET, DELAYED RELEASE ORAL at 21:21

## 2017-07-25 RX ADMIN — TAMSULOSIN HYDROCHLORIDE 0.4 MG: 0.4 CAPSULE ORAL at 08:50

## 2017-07-25 RX ADMIN — QUETIAPINE 150 MG: 150 TABLET, EXTENDED RELEASE ORAL at 21:21

## 2017-07-25 RX ADMIN — CLONAZEPAM 1 MG: 0.5 TABLET ORAL at 21:32

## 2017-07-25 RX ADMIN — OXYCODONE HYDROCHLORIDE 10 MG: 5 TABLET ORAL at 07:07

## 2017-07-25 ASSESSMENT — PAIN DESCRIPTION - ORIENTATION
ORIENTATION: RIGHT;LEFT
ORIENTATION: LEFT;RIGHT

## 2017-07-25 ASSESSMENT — PAIN SCALES - GENERAL
PAINLEVEL_OUTOF10: 7
PAINLEVEL_OUTOF10: 8
PAINLEVEL_OUTOF10: 9
PAINLEVEL_OUTOF10: 9
PAINLEVEL_OUTOF10: 7
PAINLEVEL_OUTOF10: 5
PAINLEVEL_OUTOF10: 8

## 2017-07-25 ASSESSMENT — PAIN DESCRIPTION - LOCATION
LOCATION: FOOT;LEG;KNEE
LOCATION: FOOT;LEG;KNEE

## 2017-07-25 ASSESSMENT — PAIN DESCRIPTION - PAIN TYPE
TYPE: CHRONIC PAIN
TYPE: CHRONIC PAIN

## 2017-07-26 VITALS
OXYGEN SATURATION: 100 % | HEIGHT: 71 IN | DIASTOLIC BLOOD PRESSURE: 50 MMHG | SYSTOLIC BLOOD PRESSURE: 104 MMHG | RESPIRATION RATE: 16 BRPM | TEMPERATURE: 98.1 F | BODY MASS INDEX: 44.1 KG/M2 | WEIGHT: 315 LBS | HEART RATE: 63 BPM

## 2017-07-26 LAB
ANION GAP SERPL CALCULATED.3IONS-SCNC: 6 MMOL/L (ref 9–17)
BNP INTERPRETATION: ABNORMAL
BUN BLDV-MCNC: 43 MG/DL (ref 6–20)
BUN/CREAT BLD: 26 (ref 9–20)
CALCIUM SERPL-MCNC: 9.8 MG/DL (ref 8.6–10.4)
CHLORIDE BLD-SCNC: 87 MMOL/L (ref 98–107)
CO2: 42 MMOL/L (ref 20–31)
CREAT SERPL-MCNC: 1.67 MG/DL (ref 0.7–1.2)
GFR AFRICAN AMERICAN: 52 ML/MIN
GFR NON-AFRICAN AMERICAN: 43 ML/MIN
GFR SERPL CREATININE-BSD FRML MDRD: ABNORMAL ML/MIN/{1.73_M2}
GFR SERPL CREATININE-BSD FRML MDRD: ABNORMAL ML/MIN/{1.73_M2}
GLUCOSE BLD-MCNC: 117 MG/DL (ref 70–99)
GLUCOSE BLD-MCNC: 157 MG/DL (ref 75–110)
GLUCOSE BLD-MCNC: 88 MG/DL (ref 75–110)
POTASSIUM SERPL-SCNC: 4.6 MMOL/L (ref 3.7–5.3)
PRO-BNP: 460 PG/ML
SODIUM BLD-SCNC: 135 MMOL/L (ref 135–144)

## 2017-07-26 PROCEDURE — 6370000000 HC RX 637 (ALT 250 FOR IP): Performed by: INTERNAL MEDICINE

## 2017-07-26 PROCEDURE — 82947 ASSAY GLUCOSE BLOOD QUANT: CPT

## 2017-07-26 PROCEDURE — 6370000000 HC RX 637 (ALT 250 FOR IP): Performed by: HOSPITALIST

## 2017-07-26 PROCEDURE — 36415 COLL VENOUS BLD VENIPUNCTURE: CPT

## 2017-07-26 PROCEDURE — 6360000002 HC RX W HCPCS: Performed by: INTERNAL MEDICINE

## 2017-07-26 PROCEDURE — 99232 SBSQ HOSP IP/OBS MODERATE 35: CPT | Performed by: HOSPITALIST

## 2017-07-26 PROCEDURE — 83880 ASSAY OF NATRIURETIC PEPTIDE: CPT

## 2017-07-26 PROCEDURE — 2580000003 HC RX 258: Performed by: INTERNAL MEDICINE

## 2017-07-26 PROCEDURE — 80048 BASIC METABOLIC PNL TOTAL CA: CPT

## 2017-07-26 RX ORDER — LANOLIN ALCOHOL/MO/W.PET/CERES
325 CREAM (GRAM) TOPICAL 2 TIMES DAILY WITH MEALS
Qty: 60 TABLET | Refills: 2 | Status: ON HOLD | OUTPATIENT
Start: 2017-07-26 | End: 2017-12-08 | Stop reason: SDUPTHER

## 2017-07-26 RX ORDER — INSULIN GLARGINE 100 [IU]/ML
35 INJECTION, SOLUTION SUBCUTANEOUS DAILY
Qty: 1 VIAL | Refills: 3 | Status: ON HOLD | OUTPATIENT
Start: 2017-07-26 | End: 2017-12-08

## 2017-07-26 RX ORDER — ASPIRIN 81 MG/1
81 TABLET ORAL DAILY
Qty: 30 TABLET | Refills: 3 | Status: SHIPPED | OUTPATIENT
Start: 2017-07-26

## 2017-07-26 RX ADMIN — FERROUS SULFATE TAB EC 325 MG (65 MG FE EQUIVALENT) 325 MG: 325 (65 FE) TABLET DELAYED RESPONSE at 08:59

## 2017-07-26 RX ADMIN — DIPHENHYDRAMINE HCL 25 MG: 25 TABLET ORAL at 02:23

## 2017-07-26 RX ADMIN — ONDANSETRON 4 MG: 2 INJECTION INTRAMUSCULAR; INTRAVENOUS at 08:58

## 2017-07-26 RX ADMIN — METOLAZONE 2.5 MG: 2.5 TABLET ORAL at 08:59

## 2017-07-26 RX ADMIN — Medication 10 ML: at 08:59

## 2017-07-26 RX ADMIN — LEVOTHYROXINE SODIUM 150 MCG: 150 TABLET ORAL at 06:03

## 2017-07-26 RX ADMIN — PANTOPRAZOLE SODIUM 40 MG: 40 TABLET, DELAYED RELEASE ORAL at 08:59

## 2017-07-26 RX ADMIN — DIPHENHYDRAMINE HCL 25 MG: 25 TABLET ORAL at 08:59

## 2017-07-26 RX ADMIN — DULOXETINE HYDROCHLORIDE 60 MG: 60 CAPSULE, DELAYED RELEASE ORAL at 08:59

## 2017-07-26 RX ADMIN — ASPIRIN 81 MG: 81 TABLET, COATED ORAL at 08:59

## 2017-07-26 RX ADMIN — HEPARIN SODIUM 5000 UNITS: 5000 INJECTION, SOLUTION INTRAVENOUS; SUBCUTANEOUS at 06:03

## 2017-07-26 RX ADMIN — CLONAZEPAM 1 MG: 0.5 TABLET ORAL at 08:59

## 2017-07-26 RX ADMIN — FUROSEMIDE 40 MG: 40 TABLET ORAL at 08:59

## 2017-07-26 RX ADMIN — INSULIN GLARGINE 30 UNITS: 100 INJECTION, SOLUTION SUBCUTANEOUS at 09:05

## 2017-07-26 RX ADMIN — VENLAFAXINE HYDROCHLORIDE 150 MG: 75 CAPSULE, EXTENDED RELEASE ORAL at 08:59

## 2017-07-26 RX ADMIN — OXYCODONE HYDROCHLORIDE 10 MG: 5 TABLET ORAL at 02:23

## 2017-07-26 RX ADMIN — LINAGLIPTIN 5 MG: 5 TABLET, FILM COATED ORAL at 08:59

## 2017-07-26 RX ADMIN — INSULIN LISPRO 2 UNITS: 100 INJECTION, SOLUTION INTRAVENOUS; SUBCUTANEOUS at 11:58

## 2017-07-26 RX ADMIN — PREGABALIN 150 MG: 75 CAPSULE ORAL at 08:59

## 2017-07-26 RX ADMIN — CLOTRIMAZOLE: 10 CREAM TOPICAL at 08:59

## 2017-07-26 RX ADMIN — DIPHENHYDRAMINE HCL 25 MG: 25 TABLET ORAL at 15:02

## 2017-07-26 RX ADMIN — SPIRONOLACTONE 25 MG: 25 TABLET, FILM COATED ORAL at 08:59

## 2017-07-26 RX ADMIN — TAMSULOSIN HYDROCHLORIDE 0.4 MG: 0.4 CAPSULE ORAL at 08:59

## 2017-07-26 RX ADMIN — OXYCODONE HYDROCHLORIDE 10 MG: 5 TABLET ORAL at 08:58

## 2017-07-26 RX ADMIN — OXYCODONE HYDROCHLORIDE 10 MG: 5 TABLET ORAL at 15:02

## 2017-07-26 ASSESSMENT — PAIN SCALES - GENERAL
PAINLEVEL_OUTOF10: 3
PAINLEVEL_OUTOF10: 0
PAINLEVEL_OUTOF10: 8
PAINLEVEL_OUTOF10: 8

## 2017-08-18 ENCOUNTER — HOSPITAL ENCOUNTER (INPATIENT)
Age: 52
LOS: 8 days | Discharge: HOME HEALTH | DRG: 041 | End: 2017-08-26
Attending: EMERGENCY MEDICINE | Admitting: INTERNAL MEDICINE
Payer: MEDICARE

## 2017-08-18 ENCOUNTER — APPOINTMENT (OUTPATIENT)
Dept: GENERAL RADIOLOGY | Age: 52
DRG: 041 | End: 2017-08-18
Payer: MEDICARE

## 2017-08-18 DIAGNOSIS — L03.119 CELLULITIS OF HEEL: ICD-10-CM

## 2017-08-18 DIAGNOSIS — E87.1 HYPONATREMIA WITH EXCESS EXTRACELLULAR FLUID VOLUME: Primary | ICD-10-CM

## 2017-08-18 DIAGNOSIS — M79.89 SWELLING OF BOTH LOWER EXTREMITIES: ICD-10-CM

## 2017-08-18 LAB
ABSOLUTE EOS #: 0.2 K/UL (ref 0–0.4)
ABSOLUTE LYMPH #: 0.7 K/UL (ref 1–4.8)
ABSOLUTE MONO #: 0.6 K/UL (ref 0.2–0.8)
ALBUMIN SERPL-MCNC: 3.5 G/DL (ref 3.5–5.2)
ALBUMIN/GLOBULIN RATIO: ABNORMAL (ref 1–2.5)
ALP BLD-CCNC: 141 U/L (ref 40–129)
ALT SERPL-CCNC: 18 U/L (ref 5–41)
ANION GAP SERPL CALCULATED.3IONS-SCNC: 13 MMOL/L (ref 9–17)
AST SERPL-CCNC: 18 U/L
BASOPHILS # BLD: 0 %
BASOPHILS ABSOLUTE: 0 K/UL (ref 0–0.2)
BILIRUB SERPL-MCNC: 0.48 MG/DL (ref 0.3–1.2)
BILIRUBIN URINE: NEGATIVE
BUN BLDV-MCNC: 21 MG/DL (ref 6–20)
BUN/CREAT BLD: 17 (ref 9–20)
CALCIUM SERPL-MCNC: 9.7 MG/DL (ref 8.6–10.4)
CHLORIDE BLD-SCNC: 88 MMOL/L (ref 98–107)
CO2: 29 MMOL/L (ref 20–31)
COLOR: YELLOW
COMMENT UA: ABNORMAL
CREAT SERPL-MCNC: 1.27 MG/DL (ref 0.7–1.2)
DIFFERENTIAL TYPE: ABNORMAL
EOSINOPHILS RELATIVE PERCENT: 3 %
GFR AFRICAN AMERICAN: >60 ML/MIN
GFR NON-AFRICAN AMERICAN: 59 ML/MIN
GFR SERPL CREATININE-BSD FRML MDRD: ABNORMAL ML/MIN/{1.73_M2}
GFR SERPL CREATININE-BSD FRML MDRD: ABNORMAL ML/MIN/{1.73_M2}
GLUCOSE BLD-MCNC: 113 MG/DL (ref 70–99)
GLUCOSE BLD-MCNC: 121 MG/DL (ref 75–110)
GLUCOSE BLD-MCNC: 96 MG/DL (ref 75–110)
GLUCOSE URINE: NEGATIVE
HCT VFR BLD CALC: 33.4 % (ref 41–53)
HEMOGLOBIN: 11.1 G/DL (ref 13.5–17.5)
INR BLD: 1
KETONES, URINE: NEGATIVE
LEUKOCYTE ESTERASE, URINE: NEGATIVE
LIPASE: 22 U/L (ref 13–60)
LYMPHOCYTES # BLD: 8 %
MCH RBC QN AUTO: 29.7 PG (ref 26–34)
MCHC RBC AUTO-ENTMCNC: 33.4 G/DL (ref 31–37)
MCV RBC AUTO: 89.1 FL (ref 80–100)
MONOCYTES # BLD: 8 %
NITRITE, URINE: NEGATIVE
PARTIAL THROMBOPLASTIN TIME: 28.3 SEC (ref 23–31)
PDW BLD-RTO: 14.4 % (ref 11.5–14.5)
PH UA: 5.5 (ref 5–8)
PLATELET # BLD: 169 K/UL (ref 130–400)
PLATELET ESTIMATE: ABNORMAL
PMV BLD AUTO: ABNORMAL FL (ref 6–12)
POTASSIUM SERPL-SCNC: 5 MMOL/L (ref 3.7–5.3)
PROTEIN UA: NEGATIVE
PROTHROMBIN TIME: 10.2 SEC (ref 9.7–11.6)
RBC # BLD: 3.75 M/UL (ref 4.5–5.9)
RBC # BLD: ABNORMAL 10*6/UL
SEG NEUTROPHILS: 81 %
SEGMENTED NEUTROPHILS ABSOLUTE COUNT: 6.6 K/UL (ref 1.8–7.7)
SODIUM BLD-SCNC: 130 MMOL/L (ref 135–144)
SPECIFIC GRAVITY UA: 1 (ref 1–1.03)
TOTAL PROTEIN: 7.5 G/DL (ref 6.4–8.3)
TURBIDITY: CLEAR
URINE HGB: NEGATIVE
UROBILINOGEN, URINE: NORMAL
WBC # BLD: 8.1 K/UL (ref 3.5–11)
WBC # BLD: ABNORMAL 10*3/UL

## 2017-08-18 PROCEDURE — 82947 ASSAY GLUCOSE BLOOD QUANT: CPT

## 2017-08-18 PROCEDURE — 6370000000 HC RX 637 (ALT 250 FOR IP): Performed by: INTERNAL MEDICINE

## 2017-08-18 PROCEDURE — 36415 COLL VENOUS BLD VENIPUNCTURE: CPT

## 2017-08-18 PROCEDURE — 99284 EMERGENCY DEPT VISIT MOD MDM: CPT

## 2017-08-18 PROCEDURE — 96374 THER/PROPH/DIAG INJ IV PUSH: CPT

## 2017-08-18 PROCEDURE — 86403 PARTICLE AGGLUT ANTBDY SCRN: CPT

## 2017-08-18 PROCEDURE — 85610 PROTHROMBIN TIME: CPT

## 2017-08-18 PROCEDURE — 83690 ASSAY OF LIPASE: CPT

## 2017-08-18 PROCEDURE — 87040 BLOOD CULTURE FOR BACTERIA: CPT

## 2017-08-18 PROCEDURE — 6360000002 HC RX W HCPCS: Performed by: INTERNAL MEDICINE

## 2017-08-18 PROCEDURE — 87205 SMEAR GRAM STAIN: CPT

## 2017-08-18 PROCEDURE — 1200000000 HC SEMI PRIVATE

## 2017-08-18 PROCEDURE — 80053 COMPREHEN METABOLIC PANEL: CPT

## 2017-08-18 PROCEDURE — 87070 CULTURE OTHR SPECIMN AEROBIC: CPT

## 2017-08-18 PROCEDURE — 2580000003 HC RX 258: Performed by: NURSE PRACTITIONER

## 2017-08-18 PROCEDURE — 87186 SC STD MICRODIL/AGAR DIL: CPT

## 2017-08-18 PROCEDURE — 96361 HYDRATE IV INFUSION ADD-ON: CPT

## 2017-08-18 PROCEDURE — 81003 URINALYSIS AUTO W/O SCOPE: CPT

## 2017-08-18 PROCEDURE — 85730 THROMBOPLASTIN TIME PARTIAL: CPT

## 2017-08-18 PROCEDURE — 96375 TX/PRO/DX INJ NEW DRUG ADDON: CPT

## 2017-08-18 PROCEDURE — 87086 URINE CULTURE/COLONY COUNT: CPT

## 2017-08-18 PROCEDURE — 85025 COMPLETE CBC W/AUTO DIFF WBC: CPT

## 2017-08-18 PROCEDURE — 2060000000 HC ICU INTERMEDIATE R&B

## 2017-08-18 PROCEDURE — 71010 XR CHEST PORTABLE: CPT

## 2017-08-18 PROCEDURE — 2580000003 HC RX 258: Performed by: INTERNAL MEDICINE

## 2017-08-18 PROCEDURE — 6360000002 HC RX W HCPCS: Performed by: NURSE PRACTITIONER

## 2017-08-18 PROCEDURE — 51702 INSERT TEMP BLADDER CATH: CPT

## 2017-08-18 PROCEDURE — 2500000003 HC RX 250 WO HCPCS: Performed by: INTERNAL MEDICINE

## 2017-08-18 RX ORDER — ACETAMINOPHEN 650 MG
TABLET, EXTENDED RELEASE ORAL DAILY
Status: DISCONTINUED | OUTPATIENT
Start: 2017-08-18 | End: 2017-08-22

## 2017-08-18 RX ORDER — MORPHINE SULFATE 4 MG/ML
4 INJECTION, SOLUTION INTRAMUSCULAR; INTRAVENOUS ONCE
Status: COMPLETED | OUTPATIENT
Start: 2017-08-18 | End: 2017-08-18

## 2017-08-18 RX ORDER — CLONAZEPAM 0.5 MG/1
1 TABLET ORAL 2 TIMES DAILY PRN
Status: DISCONTINUED | OUTPATIENT
Start: 2017-08-18 | End: 2017-08-20

## 2017-08-18 RX ORDER — MORPHINE SULFATE 2 MG/ML
2 INJECTION, SOLUTION INTRAMUSCULAR; INTRAVENOUS
Status: DISCONTINUED | OUTPATIENT
Start: 2017-08-18 | End: 2017-08-23

## 2017-08-18 RX ORDER — POTASSIUM CHLORIDE 20 MEQ/1
40 TABLET, EXTENDED RELEASE ORAL PRN
Status: DISCONTINUED | OUTPATIENT
Start: 2017-08-18 | End: 2017-08-26 | Stop reason: HOSPADM

## 2017-08-18 RX ORDER — POTASSIUM CHLORIDE 7.45 MG/ML
10 INJECTION INTRAVENOUS PRN
Status: DISCONTINUED | OUTPATIENT
Start: 2017-08-18 | End: 2017-08-26 | Stop reason: HOSPADM

## 2017-08-18 RX ORDER — LEVOTHYROXINE SODIUM 0.15 MG/1
150 TABLET ORAL DAILY
Status: DISCONTINUED | OUTPATIENT
Start: 2017-08-18 | End: 2017-08-26 | Stop reason: HOSPADM

## 2017-08-18 RX ORDER — ACETAMINOPHEN 325 MG/1
650 TABLET ORAL EVERY 4 HOURS PRN
Status: DISCONTINUED | OUTPATIENT
Start: 2017-08-18 | End: 2017-08-26 | Stop reason: HOSPADM

## 2017-08-18 RX ORDER — POTASSIUM CHLORIDE 20MEQ/15ML
40 LIQUID (ML) ORAL PRN
Status: DISCONTINUED | OUTPATIENT
Start: 2017-08-18 | End: 2017-08-26 | Stop reason: HOSPADM

## 2017-08-18 RX ORDER — BUMETANIDE 1 MG/1
2 TABLET ORAL 2 TIMES DAILY
Status: DISCONTINUED | OUTPATIENT
Start: 2017-08-18 | End: 2017-08-26 | Stop reason: HOSPADM

## 2017-08-18 RX ORDER — PANTOPRAZOLE SODIUM 40 MG/1
40 TABLET, DELAYED RELEASE ORAL
Status: DISCONTINUED | OUTPATIENT
Start: 2017-08-19 | End: 2017-08-26 | Stop reason: HOSPADM

## 2017-08-18 RX ORDER — ASPIRIN 81 MG/1
81 TABLET ORAL DAILY
Status: DISCONTINUED | OUTPATIENT
Start: 2017-08-18 | End: 2017-08-26 | Stop reason: HOSPADM

## 2017-08-18 RX ORDER — DULOXETIN HYDROCHLORIDE 60 MG/1
60 CAPSULE, DELAYED RELEASE ORAL DAILY
Status: DISCONTINUED | OUTPATIENT
Start: 2017-08-18 | End: 2017-08-26 | Stop reason: HOSPADM

## 2017-08-18 RX ORDER — NICOTINE POLACRILEX 4 MG
15 LOZENGE BUCCAL PRN
Status: DISCONTINUED | OUTPATIENT
Start: 2017-08-18 | End: 2017-08-26 | Stop reason: HOSPADM

## 2017-08-18 RX ORDER — VENLAFAXINE HYDROCHLORIDE 75 MG/1
150 CAPSULE, EXTENDED RELEASE ORAL DAILY
Status: DISCONTINUED | OUTPATIENT
Start: 2017-08-18 | End: 2017-08-18

## 2017-08-18 RX ORDER — MORPHINE SULFATE 4 MG/ML
4 INJECTION, SOLUTION INTRAMUSCULAR; INTRAVENOUS
Status: DISCONTINUED | OUTPATIENT
Start: 2017-08-18 | End: 2017-08-23

## 2017-08-18 RX ORDER — SODIUM CHLORIDE 0.9 % (FLUSH) 0.9 %
10 SYRINGE (ML) INJECTION EVERY 12 HOURS SCHEDULED
Status: DISCONTINUED | OUTPATIENT
Start: 2017-08-18 | End: 2017-08-26 | Stop reason: HOSPADM

## 2017-08-18 RX ORDER — INSULIN GLARGINE 100 [IU]/ML
30 INJECTION, SOLUTION SUBCUTANEOUS DAILY
Status: DISCONTINUED | OUTPATIENT
Start: 2017-08-18 | End: 2017-08-21 | Stop reason: ALTCHOICE

## 2017-08-18 RX ORDER — SPIRONOLACTONE 25 MG/1
50 TABLET ORAL 2 TIMES DAILY
Status: DISCONTINUED | OUTPATIENT
Start: 2017-08-18 | End: 2017-08-20 | Stop reason: SDUPTHER

## 2017-08-18 RX ORDER — QUETIAPINE 150 MG/1
150 TABLET, FILM COATED, EXTENDED RELEASE ORAL NIGHTLY
Status: DISCONTINUED | OUTPATIENT
Start: 2017-08-18 | End: 2017-08-26 | Stop reason: HOSPADM

## 2017-08-18 RX ORDER — PREGABALIN 75 MG/1
150 CAPSULE ORAL 2 TIMES DAILY
Status: DISCONTINUED | OUTPATIENT
Start: 2017-08-18 | End: 2017-08-26 | Stop reason: HOSPADM

## 2017-08-18 RX ORDER — VENLAFAXINE HYDROCHLORIDE 75 MG/1
150 CAPSULE, EXTENDED RELEASE ORAL NIGHTLY
Status: DISCONTINUED | OUTPATIENT
Start: 2017-08-19 | End: 2017-08-26 | Stop reason: HOSPADM

## 2017-08-18 RX ORDER — TAMSULOSIN HYDROCHLORIDE 0.4 MG/1
0.4 CAPSULE ORAL DAILY
Status: DISCONTINUED | OUTPATIENT
Start: 2017-08-18 | End: 2017-08-26 | Stop reason: HOSPADM

## 2017-08-18 RX ORDER — NITROGLYCERIN 0.4 MG/1
0.4 TABLET SUBLINGUAL EVERY 5 MIN PRN
Status: DISCONTINUED | OUTPATIENT
Start: 2017-08-18 | End: 2017-08-26 | Stop reason: HOSPADM

## 2017-08-18 RX ORDER — OXYCODONE AND ACETAMINOPHEN 10; 325 MG/1; MG/1
1 TABLET ORAL EVERY 4 HOURS PRN
Status: DISCONTINUED | OUTPATIENT
Start: 2017-08-18 | End: 2017-08-26 | Stop reason: HOSPADM

## 2017-08-18 RX ORDER — PIOGLITAZONEHYDROCHLORIDE 30 MG/1
30 TABLET ORAL DAILY
Status: ON HOLD | COMMUNITY
End: 2017-08-26 | Stop reason: HOSPADM

## 2017-08-18 RX ORDER — IBUPROFEN 600 MG/1
600 TABLET ORAL EVERY 6 HOURS PRN
Status: ON HOLD | COMMUNITY
End: 2021-01-14 | Stop reason: HOSPADM

## 2017-08-18 RX ORDER — SODIUM CHLORIDE 9 MG/ML
INJECTION, SOLUTION INTRAVENOUS CONTINUOUS
Status: DISCONTINUED | OUTPATIENT
Start: 2017-08-18 | End: 2017-08-20

## 2017-08-18 RX ORDER — SPIRONOLACTONE 25 MG/1
25 TABLET ORAL 2 TIMES DAILY
Status: DISCONTINUED | OUTPATIENT
Start: 2017-08-18 | End: 2017-08-20 | Stop reason: SDUPTHER

## 2017-08-18 RX ORDER — DEXTROSE MONOHYDRATE 25 G/50ML
12.5 INJECTION, SOLUTION INTRAVENOUS PRN
Status: DISCONTINUED | OUTPATIENT
Start: 2017-08-18 | End: 2017-08-26 | Stop reason: HOSPADM

## 2017-08-18 RX ORDER — PIOGLITAZONEHYDROCHLORIDE 30 MG/1
30 TABLET ORAL DAILY
Status: DISCONTINUED | OUTPATIENT
Start: 2017-08-18 | End: 2017-08-20

## 2017-08-18 RX ORDER — SODIUM CHLORIDE 0.9 % (FLUSH) 0.9 %
10 SYRINGE (ML) INJECTION PRN
Status: DISCONTINUED | OUTPATIENT
Start: 2017-08-18 | End: 2017-08-26 | Stop reason: HOSPADM

## 2017-08-18 RX ORDER — IBUPROFEN 200 MG
600 TABLET ORAL EVERY 6 HOURS PRN
Status: DISCONTINUED | OUTPATIENT
Start: 2017-08-18 | End: 2017-08-26 | Stop reason: HOSPADM

## 2017-08-18 RX ORDER — OXYCODONE AND ACETAMINOPHEN 10; 325 MG/1; MG/1
1 TABLET ORAL EVERY 4 HOURS PRN
Status: ON HOLD | COMMUNITY
End: 2017-08-25 | Stop reason: HOSPADM

## 2017-08-18 RX ORDER — SODIUM CHLORIDE 9 MG/ML
INJECTION, SOLUTION INTRAVENOUS CONTINUOUS
Status: DISCONTINUED | OUTPATIENT
Start: 2017-08-18 | End: 2017-08-19 | Stop reason: SDUPTHER

## 2017-08-18 RX ORDER — NADOLOL 20 MG/1
40 TABLET ORAL DAILY
Status: DISCONTINUED | OUTPATIENT
Start: 2017-08-18 | End: 2017-08-26 | Stop reason: HOSPADM

## 2017-08-18 RX ORDER — ONDANSETRON 2 MG/ML
4 INJECTION INTRAMUSCULAR; INTRAVENOUS EVERY 6 HOURS PRN
Status: DISCONTINUED | OUTPATIENT
Start: 2017-08-18 | End: 2017-08-26 | Stop reason: HOSPADM

## 2017-08-18 RX ORDER — SPIRONOLACTONE 25 MG/1
50 TABLET ORAL 2 TIMES DAILY
Status: ON HOLD | COMMUNITY
End: 2020-11-06

## 2017-08-18 RX ORDER — DEXTROSE MONOHYDRATE 50 MG/ML
100 INJECTION, SOLUTION INTRAVENOUS PRN
Status: DISCONTINUED | OUTPATIENT
Start: 2017-08-18 | End: 2017-08-26 | Stop reason: HOSPADM

## 2017-08-18 RX ORDER — BISACODYL 10 MG
10 SUPPOSITORY, RECTAL RECTAL DAILY PRN
Status: DISCONTINUED | OUTPATIENT
Start: 2017-08-18 | End: 2017-08-26 | Stop reason: HOSPADM

## 2017-08-18 RX ORDER — LANOLIN ALCOHOL/MO/W.PET/CERES
325 CREAM (GRAM) TOPICAL 2 TIMES DAILY WITH MEALS
Status: DISCONTINUED | OUTPATIENT
Start: 2017-08-18 | End: 2017-08-26 | Stop reason: HOSPADM

## 2017-08-18 RX ORDER — MAGNESIUM SULFATE 1 G/100ML
1 INJECTION INTRAVENOUS PRN
Status: DISCONTINUED | OUTPATIENT
Start: 2017-08-18 | End: 2017-08-26 | Stop reason: HOSPADM

## 2017-08-18 RX ADMIN — MORPHINE SULFATE 4 MG: 4 INJECTION, SOLUTION INTRAMUSCULAR; INTRAVENOUS at 14:04

## 2017-08-18 RX ADMIN — RIFAXIMIN 550 MG: 550 TABLET ORAL at 20:40

## 2017-08-18 RX ADMIN — CLONAZEPAM 1 MG: 0.5 TABLET ORAL at 23:08

## 2017-08-18 RX ADMIN — MORPHINE SULFATE 4 MG: 4 INJECTION, SOLUTION INTRAMUSCULAR; INTRAVENOUS at 23:07

## 2017-08-18 RX ADMIN — OXYCODONE HYDROCHLORIDE AND ACETAMINOPHEN 1 TABLET: 10; 325 TABLET ORAL at 20:57

## 2017-08-18 RX ADMIN — VANCOMYCIN HYDROCHLORIDE 1500 MG: 1 INJECTION, POWDER, LYOPHILIZED, FOR SOLUTION INTRAVENOUS at 20:40

## 2017-08-18 RX ADMIN — METFORMIN HYDROCHLORIDE 1000 MG: 500 TABLET, FILM COATED ORAL at 18:19

## 2017-08-18 RX ADMIN — FERROUS SULFATE TAB EC 325 MG (65 MG FE EQUIVALENT) 325 MG: 325 (65 FE) TABLET DELAYED RESPONSE at 18:19

## 2017-08-18 RX ADMIN — PREGABALIN 150 MG: 75 CAPSULE ORAL at 20:40

## 2017-08-18 RX ADMIN — MORPHINE SULFATE 2 MG: 2 INJECTION, SOLUTION INTRAMUSCULAR; INTRAVENOUS at 18:19

## 2017-08-18 RX ADMIN — MORPHINE SULFATE 2 MG: 2 INJECTION, SOLUTION INTRAMUSCULAR; INTRAVENOUS at 20:28

## 2017-08-18 RX ADMIN — BUMETANIDE 2 MG: 1 TABLET ORAL at 20:40

## 2017-08-18 RX ADMIN — SPIRONOLACTONE 50 MG: 25 TABLET, FILM COATED ORAL at 20:57

## 2017-08-18 RX ADMIN — ASPIRIN 81 MG: 81 TABLET, COATED ORAL at 20:39

## 2017-08-18 RX ADMIN — MICONAZORB AF ANTIFUNGAL POWDER: 1.42 POWDER TOPICAL at 20:39

## 2017-08-18 RX ADMIN — HYDROMORPHONE HYDROCHLORIDE 1 MG: 1 INJECTION, SOLUTION INTRAMUSCULAR; INTRAVENOUS; SUBCUTANEOUS at 15:57

## 2017-08-18 RX ADMIN — SODIUM CHLORIDE: 9 INJECTION, SOLUTION INTRAVENOUS at 17:59

## 2017-08-18 RX ADMIN — PIOGLITAZONE 30 MG: 30 TABLET ORAL at 20:40

## 2017-08-18 RX ADMIN — SPIRONOLACTONE 25 MG: 25 TABLET, FILM COATED ORAL at 20:58

## 2017-08-18 RX ADMIN — ENOXAPARIN SODIUM 40 MG: 40 INJECTION SUBCUTANEOUS at 20:39

## 2017-08-18 RX ADMIN — QUETIAPINE 150 MG: 150 TABLET, EXTENDED RELEASE ORAL at 23:07

## 2017-08-18 RX ADMIN — SODIUM CHLORIDE: 9 INJECTION, SOLUTION INTRAVENOUS at 14:04

## 2017-08-18 RX ADMIN — DULOXETINE HYDROCHLORIDE 60 MG: 60 CAPSULE, DELAYED RELEASE ORAL at 20:40

## 2017-08-18 ASSESSMENT — PAIN DESCRIPTION - PAIN TYPE: TYPE: ACUTE PAIN

## 2017-08-18 ASSESSMENT — PAIN SCALES - GENERAL
PAINLEVEL_OUTOF10: 8
PAINLEVEL_OUTOF10: 6
PAINLEVEL_OUTOF10: 6
PAINLEVEL_OUTOF10: 8
PAINLEVEL_OUTOF10: 0
PAINLEVEL_OUTOF10: 7
PAINLEVEL_OUTOF10: 10
PAINLEVEL_OUTOF10: 0
PAINLEVEL_OUTOF10: 9
PAINLEVEL_OUTOF10: 10
PAINLEVEL_OUTOF10: 4

## 2017-08-18 ASSESSMENT — ENCOUNTER SYMPTOMS
BLOOD IN STOOL: 0
NAUSEA: 1
DIARRHEA: 0
CHEST TIGHTNESS: 0
COLOR CHANGE: 0
SHORTNESS OF BREATH: 0
VOMITING: 0
ABDOMINAL PAIN: 0
COUGH: 0
ABDOMINAL DISTENTION: 1

## 2017-08-18 ASSESSMENT — PAIN DESCRIPTION - LOCATION: LOCATION: LEG

## 2017-08-18 ASSESSMENT — PAIN DESCRIPTION - DESCRIPTORS: DESCRIPTORS: PRESSURE;TIGHTNESS

## 2017-08-18 ASSESSMENT — PAIN DESCRIPTION - ORIENTATION: ORIENTATION: RIGHT;LEFT

## 2017-08-19 ENCOUNTER — APPOINTMENT (OUTPATIENT)
Dept: GENERAL RADIOLOGY | Age: 52
DRG: 041 | End: 2017-08-19
Payer: MEDICARE

## 2017-08-19 PROBLEM — I87.2 VENOUS STASIS DERMATITIS OF BOTH LOWER EXTREMITIES: Chronic | Status: ACTIVE | Noted: 2017-08-19

## 2017-08-19 PROBLEM — I50.31 ACUTE DIASTOLIC (CONGESTIVE) HEART FAILURE (HCC): Status: RESOLVED | Noted: 2017-07-18 | Resolved: 2017-08-19

## 2017-08-19 PROBLEM — N17.9 AKI (ACUTE KIDNEY INJURY) (HCC): Status: RESOLVED | Noted: 2017-07-20 | Resolved: 2017-08-19

## 2017-08-19 PROBLEM — L03.119 CELLULITIS OF LOWER EXTREMITY: Status: ACTIVE | Noted: 2017-08-19

## 2017-08-19 LAB
ABSOLUTE EOS #: 0.2 K/UL (ref 0–0.4)
ABSOLUTE LYMPH #: 0.8 K/UL (ref 1–4.8)
ABSOLUTE MONO #: 0.5 K/UL (ref 0.2–0.8)
ALBUMIN SERPL-MCNC: 3.1 G/DL (ref 3.5–5.2)
ALBUMIN/GLOBULIN RATIO: ABNORMAL (ref 1–2.5)
ALP BLD-CCNC: 128 U/L (ref 40–129)
ALT SERPL-CCNC: 15 U/L (ref 5–41)
ANION GAP SERPL CALCULATED.3IONS-SCNC: 10 MMOL/L (ref 9–17)
AST SERPL-CCNC: 14 U/L
BASOPHILS # BLD: 0 %
BASOPHILS ABSOLUTE: 0 K/UL (ref 0–0.2)
BILIRUB SERPL-MCNC: 0.37 MG/DL (ref 0.3–1.2)
BUN BLDV-MCNC: 17 MG/DL (ref 6–20)
BUN/CREAT BLD: 15 (ref 9–20)
CALCIUM SERPL-MCNC: 8.7 MG/DL (ref 8.6–10.4)
CHLORIDE BLD-SCNC: 92 MMOL/L (ref 98–107)
CO2: 34 MMOL/L (ref 20–31)
CREAT SERPL-MCNC: 1.15 MG/DL (ref 0.7–1.2)
CULTURE: NO GROWTH
CULTURE: NORMAL
DIFFERENTIAL TYPE: ABNORMAL
EOSINOPHILS RELATIVE PERCENT: 4 %
GFR AFRICAN AMERICAN: >60 ML/MIN
GFR NON-AFRICAN AMERICAN: >60 ML/MIN
GFR SERPL CREATININE-BSD FRML MDRD: ABNORMAL ML/MIN/{1.73_M2}
GFR SERPL CREATININE-BSD FRML MDRD: ABNORMAL ML/MIN/{1.73_M2}
GLUCOSE BLD-MCNC: 114 MG/DL (ref 75–110)
GLUCOSE BLD-MCNC: 115 MG/DL (ref 75–110)
GLUCOSE BLD-MCNC: 119 MG/DL (ref 75–110)
GLUCOSE BLD-MCNC: 136 MG/DL (ref 75–110)
GLUCOSE BLD-MCNC: 139 MG/DL (ref 70–99)
HCT VFR BLD CALC: 30.9 % (ref 41–53)
HEMOGLOBIN: 10.1 G/DL (ref 13.5–17.5)
LYMPHOCYTES # BLD: 14 %
Lab: NORMAL
MCH RBC QN AUTO: 29.6 PG (ref 26–34)
MCHC RBC AUTO-ENTMCNC: 32.7 G/DL (ref 31–37)
MCV RBC AUTO: 90.3 FL (ref 80–100)
MONOCYTES # BLD: 9 %
PDW BLD-RTO: 15.5 % (ref 11.5–14.5)
PLATELET # BLD: 138 K/UL (ref 130–400)
PLATELET ESTIMATE: ABNORMAL
PMV BLD AUTO: 6.9 FL (ref 6–12)
POTASSIUM SERPL-SCNC: 4.3 MMOL/L (ref 3.7–5.3)
RBC # BLD: 3.42 M/UL (ref 4.5–5.9)
RBC # BLD: ABNORMAL 10*6/UL
SEG NEUTROPHILS: 73 %
SEGMENTED NEUTROPHILS ABSOLUTE COUNT: 4 K/UL (ref 1.8–7.7)
SODIUM BLD-SCNC: 136 MMOL/L (ref 135–144)
SPECIMEN DESCRIPTION: NORMAL
SPECIMEN DESCRIPTION: NORMAL
STATUS: NORMAL
TOTAL PROTEIN: 6.7 G/DL (ref 6.4–8.3)
WBC # BLD: 5.5 K/UL (ref 3.5–11)
WBC # BLD: ABNORMAL 10*3/UL

## 2017-08-19 PROCEDURE — 6360000002 HC RX W HCPCS: Performed by: INTERNAL MEDICINE

## 2017-08-19 PROCEDURE — 1200000000 HC SEMI PRIVATE

## 2017-08-19 PROCEDURE — 73620 X-RAY EXAM OF FOOT: CPT

## 2017-08-19 PROCEDURE — 36415 COLL VENOUS BLD VENIPUNCTURE: CPT

## 2017-08-19 PROCEDURE — 80053 COMPREHEN METABOLIC PANEL: CPT

## 2017-08-19 PROCEDURE — 6370000000 HC RX 637 (ALT 250 FOR IP): Performed by: INTERNAL MEDICINE

## 2017-08-19 PROCEDURE — 82947 ASSAY GLUCOSE BLOOD QUANT: CPT

## 2017-08-19 PROCEDURE — 99222 1ST HOSP IP/OBS MODERATE 55: CPT | Performed by: INTERNAL MEDICINE

## 2017-08-19 PROCEDURE — 85025 COMPLETE CBC W/AUTO DIFF WBC: CPT

## 2017-08-19 PROCEDURE — 2580000003 HC RX 258: Performed by: INTERNAL MEDICINE

## 2017-08-19 RX ORDER — DIPHENHYDRAMINE HCL 25 MG
25 TABLET ORAL EVERY 6 HOURS PRN
Status: DISCONTINUED | OUTPATIENT
Start: 2017-08-19 | End: 2017-08-26 | Stop reason: HOSPADM

## 2017-08-19 RX ADMIN — FERROUS SULFATE TAB EC 325 MG (65 MG FE EQUIVALENT) 325 MG: 325 (65 FE) TABLET DELAYED RESPONSE at 17:27

## 2017-08-19 RX ADMIN — PIOGLITAZONE 30 MG: 30 TABLET ORAL at 09:18

## 2017-08-19 RX ADMIN — VANCOMYCIN HYDROCHLORIDE 1500 MG: 1 INJECTION, POWDER, LYOPHILIZED, FOR SOLUTION INTRAVENOUS at 19:57

## 2017-08-19 RX ADMIN — SPIRONOLACTONE 25 MG: 25 TABLET, FILM COATED ORAL at 09:18

## 2017-08-19 RX ADMIN — NADOLOL 40 MG: 20 TABLET ORAL at 09:19

## 2017-08-19 RX ADMIN — DIPHENHYDRAMINE HCL 25 MG: 25 TABLET ORAL at 12:28

## 2017-08-19 RX ADMIN — ENOXAPARIN SODIUM 40 MG: 40 INJECTION SUBCUTANEOUS at 22:23

## 2017-08-19 RX ADMIN — SODIUM CHLORIDE: 9 INJECTION, SOLUTION INTRAVENOUS at 17:27

## 2017-08-19 RX ADMIN — LEVOTHYROXINE SODIUM 150 MCG: 150 TABLET ORAL at 06:47

## 2017-08-19 RX ADMIN — DIPHENHYDRAMINE HCL 25 MG: 25 TABLET ORAL at 19:56

## 2017-08-19 RX ADMIN — CLONAZEPAM 1 MG: 0.5 TABLET ORAL at 22:33

## 2017-08-19 RX ADMIN — MORPHINE SULFATE 4 MG: 4 INJECTION, SOLUTION INTRAMUSCULAR; INTRAVENOUS at 17:26

## 2017-08-19 RX ADMIN — OXYCODONE HYDROCHLORIDE AND ACETAMINOPHEN 1 TABLET: 10; 325 TABLET ORAL at 01:01

## 2017-08-19 RX ADMIN — ENOXAPARIN SODIUM 40 MG: 40 INJECTION SUBCUTANEOUS at 09:18

## 2017-08-19 RX ADMIN — MORPHINE SULFATE 4 MG: 4 INJECTION, SOLUTION INTRAMUSCULAR; INTRAVENOUS at 06:46

## 2017-08-19 RX ADMIN — SPIRONOLACTONE 25 MG: 25 TABLET, FILM COATED ORAL at 22:25

## 2017-08-19 RX ADMIN — MICONAZORB AF ANTIFUNGAL POWDER: 1.42 POWDER TOPICAL at 22:24

## 2017-08-19 RX ADMIN — ASPIRIN 81 MG: 81 TABLET, COATED ORAL at 09:19

## 2017-08-19 RX ADMIN — PANTOPRAZOLE SODIUM 40 MG: 40 TABLET, DELAYED RELEASE ORAL at 06:47

## 2017-08-19 RX ADMIN — MORPHINE SULFATE 4 MG: 4 INJECTION, SOLUTION INTRAMUSCULAR; INTRAVENOUS at 12:28

## 2017-08-19 RX ADMIN — RIFAXIMIN 550 MG: 550 TABLET ORAL at 09:18

## 2017-08-19 RX ADMIN — DULOXETINE HYDROCHLORIDE 60 MG: 60 CAPSULE, DELAYED RELEASE ORAL at 09:18

## 2017-08-19 RX ADMIN — OXYCODONE HYDROCHLORIDE AND ACETAMINOPHEN 1 TABLET: 10; 325 TABLET ORAL at 15:49

## 2017-08-19 RX ADMIN — CLONAZEPAM 1 MG: 0.5 TABLET ORAL at 09:39

## 2017-08-19 RX ADMIN — MORPHINE SULFATE 4 MG: 4 INJECTION, SOLUTION INTRAMUSCULAR; INTRAVENOUS at 02:47

## 2017-08-19 RX ADMIN — MORPHINE SULFATE 4 MG: 4 INJECTION, SOLUTION INTRAMUSCULAR; INTRAVENOUS at 19:56

## 2017-08-19 RX ADMIN — PREGABALIN 150 MG: 75 CAPSULE ORAL at 22:32

## 2017-08-19 RX ADMIN — VENLAFAXINE HYDROCHLORIDE 150 MG: 75 CAPSULE, EXTENDED RELEASE ORAL at 22:23

## 2017-08-19 RX ADMIN — LINAGLIPTIN 5 MG: 5 TABLET, FILM COATED ORAL at 09:19

## 2017-08-19 RX ADMIN — OXYCODONE HYDROCHLORIDE AND ACETAMINOPHEN 1 TABLET: 10; 325 TABLET ORAL at 22:27

## 2017-08-19 RX ADMIN — SODIUM CHLORIDE: 9 INJECTION, SOLUTION INTRAVENOUS at 02:48

## 2017-08-19 RX ADMIN — VANCOMYCIN HYDROCHLORIDE 1500 MG: 1 INJECTION, POWDER, LYOPHILIZED, FOR SOLUTION INTRAVENOUS at 09:32

## 2017-08-19 RX ADMIN — TAMSULOSIN HYDROCHLORIDE 0.4 MG: 0.4 CAPSULE ORAL at 09:18

## 2017-08-19 RX ADMIN — OXYCODONE HYDROCHLORIDE AND ACETAMINOPHEN 1 TABLET: 10; 325 TABLET ORAL at 09:19

## 2017-08-19 RX ADMIN — INSULIN GLARGINE 30 UNITS: 100 INJECTION, SOLUTION SUBCUTANEOUS at 09:18

## 2017-08-19 RX ADMIN — MICONAZORB AF ANTIFUNGAL POWDER: 1.42 POWDER TOPICAL at 09:18

## 2017-08-19 RX ADMIN — OXYCODONE HYDROCHLORIDE AND ACETAMINOPHEN 1 TABLET: 10; 325 TABLET ORAL at 05:06

## 2017-08-19 RX ADMIN — Medication: at 09:18

## 2017-08-19 RX ADMIN — QUETIAPINE 150 MG: 150 TABLET, EXTENDED RELEASE ORAL at 22:24

## 2017-08-19 RX ADMIN — METFORMIN HYDROCHLORIDE 1000 MG: 500 TABLET, FILM COATED ORAL at 17:26

## 2017-08-19 RX ADMIN — METFORMIN HYDROCHLORIDE 1000 MG: 500 TABLET, FILM COATED ORAL at 09:19

## 2017-08-19 RX ADMIN — BUMETANIDE 2 MG: 1 TABLET ORAL at 22:22

## 2017-08-19 RX ADMIN — FERROUS SULFATE TAB EC 325 MG (65 MG FE EQUIVALENT) 325 MG: 325 (65 FE) TABLET DELAYED RESPONSE at 09:19

## 2017-08-19 RX ADMIN — BUMETANIDE 2 MG: 1 TABLET ORAL at 09:18

## 2017-08-19 RX ADMIN — PREGABALIN 150 MG: 75 CAPSULE ORAL at 09:32

## 2017-08-19 RX ADMIN — SPIRONOLACTONE 50 MG: 25 TABLET, FILM COATED ORAL at 22:22

## 2017-08-19 RX ADMIN — RIFAXIMIN 550 MG: 550 TABLET ORAL at 22:25

## 2017-08-19 ASSESSMENT — PAIN SCALES - GENERAL
PAINLEVEL_OUTOF10: 10
PAINLEVEL_OUTOF10: 8
PAINLEVEL_OUTOF10: 9
PAINLEVEL_OUTOF10: 7
PAINLEVEL_OUTOF10: 9
PAINLEVEL_OUTOF10: 6
PAINLEVEL_OUTOF10: 9
PAINLEVEL_OUTOF10: 9
PAINLEVEL_OUTOF10: 7
PAINLEVEL_OUTOF10: 8
PAINLEVEL_OUTOF10: 7
PAINLEVEL_OUTOF10: 6

## 2017-08-19 ASSESSMENT — PAIN DESCRIPTION - PAIN TYPE
TYPE: ACUTE PAIN

## 2017-08-19 ASSESSMENT — PAIN DESCRIPTION - FREQUENCY
FREQUENCY: INTERMITTENT

## 2017-08-19 ASSESSMENT — PAIN DESCRIPTION - PROGRESSION
CLINICAL_PROGRESSION: GRADUALLY WORSENING

## 2017-08-19 ASSESSMENT — PAIN DESCRIPTION - ONSET
ONSET: ON-GOING

## 2017-08-19 ASSESSMENT — PAIN DESCRIPTION - LOCATION
LOCATION: LEG

## 2017-08-19 ASSESSMENT — PAIN DESCRIPTION - DESCRIPTORS
DESCRIPTORS: BURNING;CONSTANT
DESCRIPTORS: BURNING

## 2017-08-19 ASSESSMENT — PAIN DESCRIPTION - ORIENTATION
ORIENTATION: RIGHT;LEFT

## 2017-08-20 PROBLEM — L03.115 CELLULITIS OF RIGHT LOWER EXTREMITY: Status: ACTIVE | Noted: 2017-08-19

## 2017-08-20 LAB
C-REACTIVE PROTEIN: 74.3 MG/L (ref 0–5)
GLUCOSE BLD-MCNC: 108 MG/DL (ref 75–110)
GLUCOSE BLD-MCNC: 116 MG/DL (ref 75–110)
GLUCOSE BLD-MCNC: 136 MG/DL (ref 75–110)
GLUCOSE BLD-MCNC: 94 MG/DL (ref 75–110)
SEDIMENTATION RATE, ERYTHROCYTE: 83 MM (ref 0–15)
VANCOMYCIN TROUGH DATE LAST DOSE: ABNORMAL
VANCOMYCIN TROUGH DOSE AMOUNT: ABNORMAL
VANCOMYCIN TROUGH TIME LAST DOSE: ABNORMAL
VANCOMYCIN TROUGH: 22.2 UG/ML (ref 10–20)

## 2017-08-20 PROCEDURE — 86140 C-REACTIVE PROTEIN: CPT

## 2017-08-20 PROCEDURE — 85651 RBC SED RATE NONAUTOMATED: CPT

## 2017-08-20 PROCEDURE — 6360000002 HC RX W HCPCS: Performed by: INTERNAL MEDICINE

## 2017-08-20 PROCEDURE — 99232 SBSQ HOSP IP/OBS MODERATE 35: CPT | Performed by: INTERNAL MEDICINE

## 2017-08-20 PROCEDURE — 2580000003 HC RX 258: Performed by: INTERNAL MEDICINE

## 2017-08-20 PROCEDURE — 82947 ASSAY GLUCOSE BLOOD QUANT: CPT

## 2017-08-20 PROCEDURE — 1200000000 HC SEMI PRIVATE

## 2017-08-20 PROCEDURE — 36415 COLL VENOUS BLD VENIPUNCTURE: CPT

## 2017-08-20 PROCEDURE — 6370000000 HC RX 637 (ALT 250 FOR IP): Performed by: INTERNAL MEDICINE

## 2017-08-20 PROCEDURE — 80202 ASSAY OF VANCOMYCIN: CPT

## 2017-08-20 RX ORDER — SPIRONOLACTONE 25 MG/1
25 TABLET ORAL 2 TIMES DAILY
Status: DISCONTINUED | OUTPATIENT
Start: 2017-08-20 | End: 2017-08-20 | Stop reason: SDUPTHER

## 2017-08-20 RX ORDER — SPIRONOLACTONE 25 MG/1
75 TABLET ORAL 2 TIMES DAILY
Status: DISCONTINUED | OUTPATIENT
Start: 2017-08-20 | End: 2017-08-26 | Stop reason: HOSPADM

## 2017-08-20 RX ORDER — CLONAZEPAM 0.5 MG/1
1 TABLET ORAL 2 TIMES DAILY
Status: DISCONTINUED | OUTPATIENT
Start: 2017-08-20 | End: 2017-08-26 | Stop reason: HOSPADM

## 2017-08-20 RX ORDER — SPIRONOLACTONE 25 MG/1
50 TABLET ORAL 2 TIMES DAILY
Status: DISCONTINUED | OUTPATIENT
Start: 2017-08-20 | End: 2017-08-20 | Stop reason: SDUPTHER

## 2017-08-20 RX ADMIN — MICONAZORB AF ANTIFUNGAL POWDER: 1.42 POWDER TOPICAL at 20:07

## 2017-08-20 RX ADMIN — NADOLOL 40 MG: 20 TABLET ORAL at 08:34

## 2017-08-20 RX ADMIN — VENLAFAXINE HYDROCHLORIDE 150 MG: 75 CAPSULE, EXTENDED RELEASE ORAL at 20:05

## 2017-08-20 RX ADMIN — BUMETANIDE 2 MG: 1 TABLET ORAL at 08:33

## 2017-08-20 RX ADMIN — LINAGLIPTIN 5 MG: 5 TABLET, FILM COATED ORAL at 08:33

## 2017-08-20 RX ADMIN — PIOGLITAZONE 30 MG: 30 TABLET ORAL at 08:33

## 2017-08-20 RX ADMIN — VANCOMYCIN HYDROCHLORIDE 1500 MG: 1 INJECTION, POWDER, LYOPHILIZED, FOR SOLUTION INTRAVENOUS at 07:28

## 2017-08-20 RX ADMIN — OXYCODONE HYDROCHLORIDE AND ACETAMINOPHEN 1 TABLET: 10; 325 TABLET ORAL at 20:02

## 2017-08-20 RX ADMIN — INSULIN GLARGINE 30 UNITS: 100 INJECTION, SOLUTION SUBCUTANEOUS at 08:48

## 2017-08-20 RX ADMIN — DIPHENHYDRAMINE HCL 25 MG: 25 TABLET ORAL at 20:01

## 2017-08-20 RX ADMIN — MORPHINE SULFATE 4 MG: 4 INJECTION, SOLUTION INTRAMUSCULAR; INTRAVENOUS at 12:02

## 2017-08-20 RX ADMIN — MORPHINE SULFATE 4 MG: 4 INJECTION, SOLUTION INTRAMUSCULAR; INTRAVENOUS at 02:58

## 2017-08-20 RX ADMIN — RIFAXIMIN 550 MG: 550 TABLET ORAL at 20:06

## 2017-08-20 RX ADMIN — PANTOPRAZOLE SODIUM 40 MG: 40 TABLET, DELAYED RELEASE ORAL at 06:11

## 2017-08-20 RX ADMIN — CLONAZEPAM 1 MG: 0.5 TABLET ORAL at 20:01

## 2017-08-20 RX ADMIN — FERROUS SULFATE TAB EC 325 MG (65 MG FE EQUIVALENT) 325 MG: 325 (65 FE) TABLET DELAYED RESPONSE at 17:20

## 2017-08-20 RX ADMIN — DULOXETINE HYDROCHLORIDE 60 MG: 60 CAPSULE, DELAYED RELEASE ORAL at 08:33

## 2017-08-20 RX ADMIN — MORPHINE SULFATE 4 MG: 4 INJECTION, SOLUTION INTRAMUSCULAR; INTRAVENOUS at 21:37

## 2017-08-20 RX ADMIN — CLONAZEPAM 1 MG: 0.5 TABLET ORAL at 08:47

## 2017-08-20 RX ADMIN — OXYCODONE HYDROCHLORIDE AND ACETAMINOPHEN 1 TABLET: 10; 325 TABLET ORAL at 10:13

## 2017-08-20 RX ADMIN — METFORMIN HYDROCHLORIDE 1000 MG: 500 TABLET, FILM COATED ORAL at 17:20

## 2017-08-20 RX ADMIN — MORPHINE SULFATE 4 MG: 4 INJECTION, SOLUTION INTRAMUSCULAR; INTRAVENOUS at 18:34

## 2017-08-20 RX ADMIN — FERROUS SULFATE TAB EC 325 MG (65 MG FE EQUIVALENT) 325 MG: 325 (65 FE) TABLET DELAYED RESPONSE at 08:33

## 2017-08-20 RX ADMIN — SPIRONOLACTONE 75 MG: 25 TABLET, FILM COATED ORAL at 20:01

## 2017-08-20 RX ADMIN — PREGABALIN 150 MG: 75 CAPSULE ORAL at 20:01

## 2017-08-20 RX ADMIN — DIPHENHYDRAMINE HCL 25 MG: 25 TABLET ORAL at 06:11

## 2017-08-20 RX ADMIN — MORPHINE SULFATE 4 MG: 4 INJECTION, SOLUTION INTRAMUSCULAR; INTRAVENOUS at 07:28

## 2017-08-20 RX ADMIN — MORPHINE SULFATE 4 MG: 4 INJECTION, SOLUTION INTRAMUSCULAR; INTRAVENOUS at 16:01

## 2017-08-20 RX ADMIN — ENOXAPARIN SODIUM 40 MG: 40 INJECTION SUBCUTANEOUS at 08:32

## 2017-08-20 RX ADMIN — TAMSULOSIN HYDROCHLORIDE 0.4 MG: 0.4 CAPSULE ORAL at 08:34

## 2017-08-20 RX ADMIN — METFORMIN HYDROCHLORIDE 1000 MG: 500 TABLET, FILM COATED ORAL at 08:33

## 2017-08-20 RX ADMIN — QUETIAPINE 150 MG: 150 TABLET, EXTENDED RELEASE ORAL at 20:06

## 2017-08-20 RX ADMIN — DIPHENHYDRAMINE HCL 25 MG: 25 TABLET ORAL at 13:01

## 2017-08-20 RX ADMIN — ENOXAPARIN SODIUM 40 MG: 40 INJECTION SUBCUTANEOUS at 20:11

## 2017-08-20 RX ADMIN — VANCOMYCIN HYDROCHLORIDE 1250 MG: 5 INJECTION, POWDER, LYOPHILIZED, FOR SOLUTION INTRAVENOUS at 20:02

## 2017-08-20 RX ADMIN — RIFAXIMIN 550 MG: 550 TABLET ORAL at 08:33

## 2017-08-20 RX ADMIN — MORPHINE SULFATE 2 MG: 2 INJECTION, SOLUTION INTRAMUSCULAR; INTRAVENOUS at 23:49

## 2017-08-20 RX ADMIN — ASPIRIN 81 MG: 81 TABLET, COATED ORAL at 08:33

## 2017-08-20 RX ADMIN — SPIRONOLACTONE 75 MG: 25 TABLET, FILM COATED ORAL at 08:32

## 2017-08-20 RX ADMIN — MICONAZORB AF ANTIFUNGAL POWDER: 1.42 POWDER TOPICAL at 08:51

## 2017-08-20 RX ADMIN — OXYCODONE HYDROCHLORIDE AND ACETAMINOPHEN 1 TABLET: 10; 325 TABLET ORAL at 15:17

## 2017-08-20 RX ADMIN — PREGABALIN 150 MG: 75 CAPSULE ORAL at 12:02

## 2017-08-20 RX ADMIN — MORPHINE SULFATE 4 MG: 4 INJECTION, SOLUTION INTRAMUSCULAR; INTRAVENOUS at 00:21

## 2017-08-20 RX ADMIN — BUMETANIDE 2 MG: 1 TABLET ORAL at 20:00

## 2017-08-20 RX ADMIN — Medication 10 ML: at 20:07

## 2017-08-20 RX ADMIN — OXYCODONE HYDROCHLORIDE AND ACETAMINOPHEN 1 TABLET: 10; 325 TABLET ORAL at 05:22

## 2017-08-20 RX ADMIN — LEVOTHYROXINE SODIUM 150 MCG: 150 TABLET ORAL at 06:11

## 2017-08-20 ASSESSMENT — PAIN DESCRIPTION - FREQUENCY
FREQUENCY: INTERMITTENT

## 2017-08-20 ASSESSMENT — PAIN DESCRIPTION - DESCRIPTORS
DESCRIPTORS: ACHING

## 2017-08-20 ASSESSMENT — PAIN SCALES - GENERAL
PAINLEVEL_OUTOF10: 3
PAINLEVEL_OUTOF10: 3
PAINLEVEL_OUTOF10: 7
PAINLEVEL_OUTOF10: 9
PAINLEVEL_OUTOF10: 9
PAINLEVEL_OUTOF10: 0
PAINLEVEL_OUTOF10: 10
PAINLEVEL_OUTOF10: 10
PAINLEVEL_OUTOF10: 5
PAINLEVEL_OUTOF10: 0
PAINLEVEL_OUTOF10: 8
PAINLEVEL_OUTOF10: 0
PAINLEVEL_OUTOF10: 9
PAINLEVEL_OUTOF10: 8
PAINLEVEL_OUTOF10: 10
PAINLEVEL_OUTOF10: 2
PAINLEVEL_OUTOF10: 5
PAINLEVEL_OUTOF10: 9

## 2017-08-20 ASSESSMENT — PAIN DESCRIPTION - PAIN TYPE
TYPE: ACUTE PAIN

## 2017-08-20 ASSESSMENT — PAIN DESCRIPTION - PROGRESSION
CLINICAL_PROGRESSION: GRADUALLY WORSENING
CLINICAL_PROGRESSION: RAPIDLY IMPROVING
CLINICAL_PROGRESSION: GRADUALLY WORSENING

## 2017-08-20 ASSESSMENT — PAIN DESCRIPTION - ONSET
ONSET: ON-GOING

## 2017-08-20 ASSESSMENT — PAIN DESCRIPTION - ORIENTATION
ORIENTATION: RIGHT;LEFT

## 2017-08-20 ASSESSMENT — PAIN DESCRIPTION - LOCATION
LOCATION: LEG

## 2017-08-21 ENCOUNTER — ANESTHESIA EVENT (OUTPATIENT)
Dept: OPERATING ROOM | Age: 52
DRG: 041 | End: 2017-08-21
Payer: MEDICARE

## 2017-08-21 PROBLEM — L03.116 CELLULITIS OF LEFT LOWER EXTREMITY: Status: ACTIVE | Noted: 2017-08-21

## 2017-08-21 LAB
ANION GAP SERPL CALCULATED.3IONS-SCNC: 8 MMOL/L (ref 9–17)
BUN BLDV-MCNC: 11 MG/DL (ref 6–20)
BUN/CREAT BLD: 11 (ref 9–20)
CALCIUM SERPL-MCNC: 8.8 MG/DL (ref 8.6–10.4)
CHLORIDE BLD-SCNC: 91 MMOL/L (ref 98–107)
CO2: 38 MMOL/L (ref 20–31)
CREAT SERPL-MCNC: 0.96 MG/DL (ref 0.7–1.2)
CULTURE: ABNORMAL
DIRECT EXAM: ABNORMAL
DIRECT EXAM: ABNORMAL
GFR AFRICAN AMERICAN: >60 ML/MIN
GFR NON-AFRICAN AMERICAN: >60 ML/MIN
GFR SERPL CREATININE-BSD FRML MDRD: ABNORMAL ML/MIN/{1.73_M2}
GFR SERPL CREATININE-BSD FRML MDRD: ABNORMAL ML/MIN/{1.73_M2}
GLUCOSE BLD-MCNC: 105 MG/DL (ref 70–99)
GLUCOSE BLD-MCNC: 122 MG/DL (ref 75–110)
GLUCOSE BLD-MCNC: 160 MG/DL (ref 75–110)
GLUCOSE BLD-MCNC: 76 MG/DL (ref 75–110)
GLUCOSE BLD-MCNC: 76 MG/DL (ref 75–110)
Lab: ABNORMAL
ORGANISM: ABNORMAL
POTASSIUM SERPL-SCNC: 3.9 MMOL/L (ref 3.7–5.3)
SODIUM BLD-SCNC: 137 MMOL/L (ref 135–144)
SPECIMEN DESCRIPTION: ABNORMAL
SPECIMEN DESCRIPTION: ABNORMAL
STATUS: ABNORMAL

## 2017-08-21 PROCEDURE — 6370000000 HC RX 637 (ALT 250 FOR IP): Performed by: INTERNAL MEDICINE

## 2017-08-21 PROCEDURE — 82947 ASSAY GLUCOSE BLOOD QUANT: CPT

## 2017-08-21 PROCEDURE — 80048 BASIC METABOLIC PNL TOTAL CA: CPT

## 2017-08-21 PROCEDURE — 1200000000 HC SEMI PRIVATE

## 2017-08-21 PROCEDURE — G8987 SELF CARE CURRENT STATUS: HCPCS

## 2017-08-21 PROCEDURE — 97166 OT EVAL MOD COMPLEX 45 MIN: CPT

## 2017-08-21 PROCEDURE — 2580000003 HC RX 258: Performed by: INTERNAL MEDICINE

## 2017-08-21 PROCEDURE — 97162 PT EVAL MOD COMPLEX 30 MIN: CPT

## 2017-08-21 PROCEDURE — 6360000002 HC RX W HCPCS: Performed by: INTERNAL MEDICINE

## 2017-08-21 PROCEDURE — 99232 SBSQ HOSP IP/OBS MODERATE 35: CPT | Performed by: INTERNAL MEDICINE

## 2017-08-21 PROCEDURE — 97530 THERAPEUTIC ACTIVITIES: CPT

## 2017-08-21 PROCEDURE — G8988 SELF CARE GOAL STATUS: HCPCS

## 2017-08-21 PROCEDURE — 97535 SELF CARE MNGMENT TRAINING: CPT

## 2017-08-21 PROCEDURE — 36415 COLL VENOUS BLD VENIPUNCTURE: CPT

## 2017-08-21 PROCEDURE — 97110 THERAPEUTIC EXERCISES: CPT

## 2017-08-21 RX ADMIN — FERROUS SULFATE TAB EC 325 MG (65 MG FE EQUIVALENT) 325 MG: 325 (65 FE) TABLET DELAYED RESPONSE at 17:52

## 2017-08-21 RX ADMIN — ENOXAPARIN SODIUM 40 MG: 40 INJECTION SUBCUTANEOUS at 22:16

## 2017-08-21 RX ADMIN — OXYCODONE HYDROCHLORIDE AND ACETAMINOPHEN 1 TABLET: 10; 325 TABLET ORAL at 01:08

## 2017-08-21 RX ADMIN — OXYCODONE HYDROCHLORIDE AND ACETAMINOPHEN 1 TABLET: 10; 325 TABLET ORAL at 10:21

## 2017-08-21 RX ADMIN — MORPHINE SULFATE 4 MG: 4 INJECTION, SOLUTION INTRAMUSCULAR; INTRAVENOUS at 06:46

## 2017-08-21 RX ADMIN — MORPHINE SULFATE 4 MG: 4 INJECTION, SOLUTION INTRAMUSCULAR; INTRAVENOUS at 04:49

## 2017-08-21 RX ADMIN — SPIRONOLACTONE 75 MG: 25 TABLET, FILM COATED ORAL at 08:59

## 2017-08-21 RX ADMIN — OXYCODONE HYDROCHLORIDE AND ACETAMINOPHEN 1 TABLET: 10; 325 TABLET ORAL at 16:31

## 2017-08-21 RX ADMIN — NADOLOL 40 MG: 20 TABLET ORAL at 08:59

## 2017-08-21 RX ADMIN — Medication 10 ML: at 09:06

## 2017-08-21 RX ADMIN — LEVOTHYROXINE SODIUM 150 MCG: 150 TABLET ORAL at 05:47

## 2017-08-21 RX ADMIN — QUETIAPINE 150 MG: 150 TABLET, EXTENDED RELEASE ORAL at 23:39

## 2017-08-21 RX ADMIN — BUMETANIDE 2 MG: 1 TABLET ORAL at 22:16

## 2017-08-21 RX ADMIN — FERROUS SULFATE TAB EC 325 MG (65 MG FE EQUIVALENT) 325 MG: 325 (65 FE) TABLET DELAYED RESPONSE at 08:59

## 2017-08-21 RX ADMIN — ASPIRIN 81 MG: 81 TABLET, COATED ORAL at 10:23

## 2017-08-21 RX ADMIN — ENOXAPARIN SODIUM 40 MG: 40 INJECTION SUBCUTANEOUS at 10:24

## 2017-08-21 RX ADMIN — MORPHINE SULFATE 4 MG: 4 INJECTION, SOLUTION INTRAMUSCULAR; INTRAVENOUS at 08:59

## 2017-08-21 RX ADMIN — MORPHINE SULFATE 4 MG: 4 INJECTION, SOLUTION INTRAMUSCULAR; INTRAVENOUS at 15:13

## 2017-08-21 RX ADMIN — OXYCODONE HYDROCHLORIDE AND ACETAMINOPHEN 1 TABLET: 10; 325 TABLET ORAL at 20:59

## 2017-08-21 RX ADMIN — PREGABALIN 150 MG: 75 CAPSULE ORAL at 10:40

## 2017-08-21 RX ADMIN — PANTOPRAZOLE SODIUM 40 MG: 40 TABLET, DELAYED RELEASE ORAL at 05:47

## 2017-08-21 RX ADMIN — BUMETANIDE 2 MG: 1 TABLET ORAL at 08:58

## 2017-08-21 RX ADMIN — PREGABALIN 150 MG: 75 CAPSULE ORAL at 22:28

## 2017-08-21 RX ADMIN — METFORMIN HYDROCHLORIDE 1000 MG: 500 TABLET, FILM COATED ORAL at 10:22

## 2017-08-21 RX ADMIN — MICONAZORB AF ANTIFUNGAL POWDER: 1.42 POWDER TOPICAL at 08:58

## 2017-08-21 RX ADMIN — MICONAZORB AF ANTIFUNGAL POWDER: 1.42 POWDER TOPICAL at 22:17

## 2017-08-21 RX ADMIN — INSULIN LISPRO 2 UNITS: 100 INJECTION, SOLUTION INTRAVENOUS; SUBCUTANEOUS at 13:09

## 2017-08-21 RX ADMIN — VANCOMYCIN HYDROCHLORIDE 1250 MG: 5 INJECTION, POWDER, LYOPHILIZED, FOR SOLUTION INTRAVENOUS at 20:54

## 2017-08-21 RX ADMIN — RIFAXIMIN 550 MG: 550 TABLET ORAL at 22:16

## 2017-08-21 RX ADMIN — CLONAZEPAM 1 MG: 0.5 TABLET ORAL at 09:10

## 2017-08-21 RX ADMIN — MORPHINE SULFATE 2 MG: 2 INJECTION, SOLUTION INTRAMUSCULAR; INTRAVENOUS at 02:47

## 2017-08-21 RX ADMIN — CLONAZEPAM 1 MG: 0.5 TABLET ORAL at 23:40

## 2017-08-21 RX ADMIN — DULOXETINE HYDROCHLORIDE 60 MG: 60 CAPSULE, DELAYED RELEASE ORAL at 08:58

## 2017-08-21 RX ADMIN — SPIRONOLACTONE 75 MG: 25 TABLET, FILM COATED ORAL at 18:39

## 2017-08-21 RX ADMIN — LINAGLIPTIN 5 MG: 5 TABLET, FILM COATED ORAL at 10:23

## 2017-08-21 RX ADMIN — OXYCODONE HYDROCHLORIDE AND ACETAMINOPHEN 1 TABLET: 10; 325 TABLET ORAL at 05:47

## 2017-08-21 RX ADMIN — VENLAFAXINE HYDROCHLORIDE 150 MG: 75 CAPSULE, EXTENDED RELEASE ORAL at 22:15

## 2017-08-21 RX ADMIN — TAMSULOSIN HYDROCHLORIDE 0.4 MG: 0.4 CAPSULE ORAL at 08:59

## 2017-08-21 RX ADMIN — MORPHINE SULFATE 4 MG: 4 INJECTION, SOLUTION INTRAMUSCULAR; INTRAVENOUS at 19:26

## 2017-08-21 RX ADMIN — MORPHINE SULFATE 4 MG: 4 INJECTION, SOLUTION INTRAMUSCULAR; INTRAVENOUS at 23:40

## 2017-08-21 RX ADMIN — METFORMIN HYDROCHLORIDE 1000 MG: 500 TABLET, FILM COATED ORAL at 17:51

## 2017-08-21 RX ADMIN — Medication: at 17:00

## 2017-08-21 RX ADMIN — INSULIN GLARGINE 30 UNITS: 100 INJECTION, SOLUTION SUBCUTANEOUS at 11:17

## 2017-08-21 RX ADMIN — RIFAXIMIN 550 MG: 550 TABLET ORAL at 09:07

## 2017-08-21 RX ADMIN — VANCOMYCIN HYDROCHLORIDE 1250 MG: 5 INJECTION, POWDER, LYOPHILIZED, FOR SOLUTION INTRAVENOUS at 09:10

## 2017-08-21 RX ADMIN — DIPHENHYDRAMINE HCL 25 MG: 25 TABLET ORAL at 15:13

## 2017-08-21 ASSESSMENT — PAIN SCALES - GENERAL
PAINLEVEL_OUTOF10: 10
PAINLEVEL_OUTOF10: 8
PAINLEVEL_OUTOF10: 8
PAINLEVEL_OUTOF10: 10
PAINLEVEL_OUTOF10: 10
PAINLEVEL_OUTOF10: 8
PAINLEVEL_OUTOF10: 10
PAINLEVEL_OUTOF10: 8
PAINLEVEL_OUTOF10: 8
PAINLEVEL_OUTOF10: 9
PAINLEVEL_OUTOF10: 10
PAINLEVEL_OUTOF10: 10
PAINLEVEL_OUTOF10: 8
PAINLEVEL_OUTOF10: 10

## 2017-08-21 ASSESSMENT — PAIN DESCRIPTION - PAIN TYPE
TYPE: ACUTE PAIN;CHRONIC PAIN
TYPE: ACUTE PAIN;CHRONIC PAIN

## 2017-08-21 ASSESSMENT — PAIN DESCRIPTION - LOCATION
LOCATION: FOOT;KNEE;LEG
LOCATION: FOOT;LEG;KNEE
LOCATION: FOOT;LEG

## 2017-08-21 ASSESSMENT — PAIN DESCRIPTION - ORIENTATION
ORIENTATION: RIGHT;LEFT

## 2017-08-22 ENCOUNTER — APPOINTMENT (OUTPATIENT)
Dept: GENERAL RADIOLOGY | Age: 52
DRG: 041 | End: 2017-08-22
Payer: MEDICARE

## 2017-08-22 ENCOUNTER — ANESTHESIA (OUTPATIENT)
Dept: OPERATING ROOM | Age: 52
DRG: 041 | End: 2017-08-22
Payer: MEDICARE

## 2017-08-22 VITALS
DIASTOLIC BLOOD PRESSURE: 46 MMHG | RESPIRATION RATE: 12 BRPM | OXYGEN SATURATION: 92 % | SYSTOLIC BLOOD PRESSURE: 93 MMHG

## 2017-08-22 LAB
ANION GAP SERPL CALCULATED.3IONS-SCNC: 8 MMOL/L (ref 9–17)
BUN BLDV-MCNC: 11 MG/DL (ref 6–20)
BUN/CREAT BLD: 11 (ref 9–20)
CALCIUM SERPL-MCNC: 9.1 MG/DL (ref 8.6–10.4)
CHLORIDE BLD-SCNC: 91 MMOL/L (ref 98–107)
CO2: 38 MMOL/L (ref 20–31)
CREAT SERPL-MCNC: 1.02 MG/DL (ref 0.7–1.2)
GFR AFRICAN AMERICAN: >60 ML/MIN
GFR NON-AFRICAN AMERICAN: >60 ML/MIN
GFR SERPL CREATININE-BSD FRML MDRD: ABNORMAL ML/MIN/{1.73_M2}
GFR SERPL CREATININE-BSD FRML MDRD: ABNORMAL ML/MIN/{1.73_M2}
GLUCOSE BLD-MCNC: 151 MG/DL (ref 75–110)
GLUCOSE BLD-MCNC: 164 MG/DL (ref 70–99)
GLUCOSE BLD-MCNC: 175 MG/DL (ref 75–110)
GLUCOSE BLD-MCNC: 183 MG/DL (ref 75–110)
GLUCOSE BLD-MCNC: 85 MG/DL (ref 75–110)
POTASSIUM SERPL-SCNC: 3.9 MMOL/L (ref 3.7–5.3)
SODIUM BLD-SCNC: 137 MMOL/L (ref 135–144)

## 2017-08-22 PROCEDURE — 82947 ASSAY GLUCOSE BLOOD QUANT: CPT

## 2017-08-22 PROCEDURE — 3700000000 HC ANESTHESIA ATTENDED CARE: Performed by: PODIATRIST

## 2017-08-22 PROCEDURE — 0QBM3ZX EXCISION OF LEFT TARSAL, PERCUTANEOUS APPROACH, DIAGNOSTIC: ICD-10-PCS | Performed by: RADIOLOGY

## 2017-08-22 PROCEDURE — 3600000002 HC SURGERY LEVEL 2 BASE: Performed by: PODIATRIST

## 2017-08-22 PROCEDURE — 2500000003 HC RX 250 WO HCPCS: Performed by: PODIATRIST

## 2017-08-22 PROCEDURE — 80048 BASIC METABOLIC PNL TOTAL CA: CPT

## 2017-08-22 PROCEDURE — 6370000000 HC RX 637 (ALT 250 FOR IP): Performed by: INTERNAL MEDICINE

## 2017-08-22 PROCEDURE — 88307 TISSUE EXAM BY PATHOLOGIST: CPT

## 2017-08-22 PROCEDURE — 86403 PARTICLE AGGLUT ANTBDY SCRN: CPT

## 2017-08-22 PROCEDURE — 97110 THERAPEUTIC EXERCISES: CPT

## 2017-08-22 PROCEDURE — 6360000002 HC RX W HCPCS: Performed by: ANESTHESIOLOGY

## 2017-08-22 PROCEDURE — 88311 DECALCIFY TISSUE: CPT

## 2017-08-22 PROCEDURE — 3600000012 HC SURGERY LEVEL 2 ADDTL 15MIN: Performed by: PODIATRIST

## 2017-08-22 PROCEDURE — 2580000003 HC RX 258: Performed by: ANESTHESIOLOGY

## 2017-08-22 PROCEDURE — 73620 X-RAY EXAM OF FOOT: CPT

## 2017-08-22 PROCEDURE — 0JBQ0ZZ EXCISION OF RIGHT FOOT SUBCUTANEOUS TISSUE AND FASCIA, OPEN APPROACH: ICD-10-PCS | Performed by: PODIATRIST

## 2017-08-22 PROCEDURE — 3209999900 FLUORO FOR SURGICAL PROCEDURES

## 2017-08-22 PROCEDURE — 7100000001 HC PACU RECOVERY - ADDTL 15 MIN: Performed by: PODIATRIST

## 2017-08-22 PROCEDURE — 97530 THERAPEUTIC ACTIVITIES: CPT

## 2017-08-22 PROCEDURE — 2060000000 HC ICU INTERMEDIATE R&B

## 2017-08-22 PROCEDURE — 99232 SBSQ HOSP IP/OBS MODERATE 35: CPT | Performed by: INTERNAL MEDICINE

## 2017-08-22 PROCEDURE — 87070 CULTURE OTHR SPECIMN AEROBIC: CPT

## 2017-08-22 PROCEDURE — 7100000000 HC PACU RECOVERY - FIRST 15 MIN: Performed by: PODIATRIST

## 2017-08-22 PROCEDURE — 87205 SMEAR GRAM STAIN: CPT

## 2017-08-22 PROCEDURE — 36415 COLL VENOUS BLD VENIPUNCTURE: CPT

## 2017-08-22 PROCEDURE — 2500000003 HC RX 250 WO HCPCS: Performed by: ANESTHESIOLOGY

## 2017-08-22 PROCEDURE — 2580000003 HC RX 258: Performed by: INTERNAL MEDICINE

## 2017-08-22 PROCEDURE — 2580000003 HC RX 258: Performed by: STUDENT IN AN ORGANIZED HEALTH CARE EDUCATION/TRAINING PROGRAM

## 2017-08-22 PROCEDURE — 0QBL3ZX EXCISION OF RIGHT TARSAL, PERCUTANEOUS APPROACH, DIAGNOSTIC: ICD-10-PCS | Performed by: RADIOLOGY

## 2017-08-22 PROCEDURE — 6360000002 HC RX W HCPCS: Performed by: INTERNAL MEDICINE

## 2017-08-22 PROCEDURE — 0JBR0ZZ EXCISION OF LEFT FOOT SUBCUTANEOUS TISSUE AND FASCIA, OPEN APPROACH: ICD-10-PCS | Performed by: PODIATRIST

## 2017-08-22 PROCEDURE — 3700000001 HC ADD 15 MINUTES (ANESTHESIA): Performed by: PODIATRIST

## 2017-08-22 PROCEDURE — 87176 TISSUE HOMOGENIZATION CULTR: CPT

## 2017-08-22 PROCEDURE — 87075 CULTR BACTERIA EXCEPT BLOOD: CPT

## 2017-08-22 RX ORDER — FENTANYL CITRATE 50 UG/ML
25 INJECTION, SOLUTION INTRAMUSCULAR; INTRAVENOUS EVERY 5 MIN PRN
Status: DISCONTINUED | OUTPATIENT
Start: 2017-08-22 | End: 2017-08-22

## 2017-08-22 RX ORDER — DEXAMETHASONE SODIUM PHOSPHATE 10 MG/ML
4 INJECTION INTRAMUSCULAR; INTRAVENOUS
Status: DISCONTINUED | OUTPATIENT
Start: 2017-08-22 | End: 2017-08-22

## 2017-08-22 RX ORDER — PROPOFOL 10 MG/ML
INJECTION, EMULSION INTRAVENOUS CONTINUOUS PRN
Status: DISCONTINUED | OUTPATIENT
Start: 2017-08-22 | End: 2017-08-22 | Stop reason: SDUPTHER

## 2017-08-22 RX ORDER — ONDANSETRON 2 MG/ML
4 INJECTION INTRAMUSCULAR; INTRAVENOUS
Status: DISCONTINUED | OUTPATIENT
Start: 2017-08-22 | End: 2017-08-22

## 2017-08-22 RX ORDER — KETAMINE HYDROCHLORIDE 100 MG/ML
INJECTION, SOLUTION INTRAMUSCULAR; INTRAVENOUS PRN
Status: DISCONTINUED | OUTPATIENT
Start: 2017-08-22 | End: 2017-08-22 | Stop reason: SDUPTHER

## 2017-08-22 RX ORDER — MEPERIDINE HYDROCHLORIDE 25 MG/ML
12.5 INJECTION INTRAMUSCULAR; INTRAVENOUS; SUBCUTANEOUS EVERY 5 MIN PRN
Status: DISCONTINUED | OUTPATIENT
Start: 2017-08-22 | End: 2017-08-22

## 2017-08-22 RX ORDER — MIDAZOLAM HYDROCHLORIDE 1 MG/ML
INJECTION INTRAMUSCULAR; INTRAVENOUS PRN
Status: DISCONTINUED | OUTPATIENT
Start: 2017-08-22 | End: 2017-08-22 | Stop reason: SDUPTHER

## 2017-08-22 RX ORDER — HYDROMORPHONE HCL 110MG/55ML
0.5 PATIENT CONTROLLED ANALGESIA SYRINGE INTRAVENOUS EVERY 5 MIN PRN
Status: DISCONTINUED | OUTPATIENT
Start: 2017-08-22 | End: 2017-08-22

## 2017-08-22 RX ORDER — SODIUM CHLORIDE 9 MG/ML
INJECTION, SOLUTION INTRAVENOUS CONTINUOUS PRN
Status: DISCONTINUED | OUTPATIENT
Start: 2017-08-22 | End: 2017-08-22 | Stop reason: SDUPTHER

## 2017-08-22 RX ORDER — SODIUM CHLORIDE 9 MG/ML
INJECTION, SOLUTION INTRAVENOUS CONTINUOUS
Status: DISCONTINUED | OUTPATIENT
Start: 2017-08-22 | End: 2017-08-22

## 2017-08-22 RX ORDER — HYDRALAZINE HYDROCHLORIDE 20 MG/ML
5 INJECTION INTRAMUSCULAR; INTRAVENOUS EVERY 10 MIN PRN
Status: DISCONTINUED | OUTPATIENT
Start: 2017-08-22 | End: 2017-08-22

## 2017-08-22 RX ORDER — LIDOCAINE HYDROCHLORIDE 20 MG/ML
INJECTION, SOLUTION EPIDURAL; INFILTRATION; INTRACAUDAL; PERINEURAL PRN
Status: DISCONTINUED | OUTPATIENT
Start: 2017-08-22 | End: 2017-08-22 | Stop reason: SDUPTHER

## 2017-08-22 RX ORDER — PROPOFOL 10 MG/ML
INJECTION, EMULSION INTRAVENOUS PRN
Status: DISCONTINUED | OUTPATIENT
Start: 2017-08-22 | End: 2017-08-22 | Stop reason: SDUPTHER

## 2017-08-22 RX ORDER — BUPIVACAINE HYDROCHLORIDE 5 MG/ML
INJECTION, SOLUTION EPIDURAL; INTRACAUDAL PRN
Status: DISCONTINUED | OUTPATIENT
Start: 2017-08-22 | End: 2017-08-22

## 2017-08-22 RX ORDER — LABETALOL HYDROCHLORIDE 5 MG/ML
5 INJECTION, SOLUTION INTRAVENOUS EVERY 10 MIN PRN
Status: DISCONTINUED | OUTPATIENT
Start: 2017-08-22 | End: 2017-08-22

## 2017-08-22 RX ORDER — DIPHENHYDRAMINE HYDROCHLORIDE 50 MG/ML
12.5 INJECTION INTRAMUSCULAR; INTRAVENOUS
Status: DISCONTINUED | OUTPATIENT
Start: 2017-08-22 | End: 2017-08-22

## 2017-08-22 RX ADMIN — PROPOFOL 50 MG: 10 INJECTION, EMULSION INTRAVENOUS at 16:34

## 2017-08-22 RX ADMIN — SODIUM CHLORIDE: 9 INJECTION, SOLUTION INTRAVENOUS at 16:34

## 2017-08-22 RX ADMIN — MORPHINE SULFATE 2 MG: 2 INJECTION, SOLUTION INTRAMUSCULAR; INTRAVENOUS at 21:26

## 2017-08-22 RX ADMIN — PROPOFOL 25 MCG/KG/MIN: 10 INJECTION, EMULSION INTRAVENOUS at 16:44

## 2017-08-22 RX ADMIN — MORPHINE SULFATE 4 MG: 4 INJECTION, SOLUTION INTRAMUSCULAR; INTRAVENOUS at 09:31

## 2017-08-22 RX ADMIN — BUMETANIDE 2 MG: 1 TABLET ORAL at 21:31

## 2017-08-22 RX ADMIN — MORPHINE SULFATE 4 MG: 4 INJECTION, SOLUTION INTRAMUSCULAR; INTRAVENOUS at 06:09

## 2017-08-22 RX ADMIN — VANCOMYCIN HYDROCHLORIDE 1250 MG: 5 INJECTION, POWDER, LYOPHILIZED, FOR SOLUTION INTRAVENOUS at 19:57

## 2017-08-22 RX ADMIN — PANTOPRAZOLE SODIUM 40 MG: 40 TABLET, DELAYED RELEASE ORAL at 06:09

## 2017-08-22 RX ADMIN — QUETIAPINE 150 MG: 150 TABLET, EXTENDED RELEASE ORAL at 22:43

## 2017-08-22 RX ADMIN — CLONAZEPAM 1 MG: 0.5 TABLET ORAL at 22:43

## 2017-08-22 RX ADMIN — SPIRONOLACTONE 75 MG: 25 TABLET, FILM COATED ORAL at 09:06

## 2017-08-22 RX ADMIN — MICONAZORB AF ANTIFUNGAL POWDER: 1.42 POWDER TOPICAL at 09:06

## 2017-08-22 RX ADMIN — INSULIN LISPRO 2 UNITS: 100 INJECTION, SOLUTION INTRAVENOUS; SUBCUTANEOUS at 09:19

## 2017-08-22 RX ADMIN — BUMETANIDE 2 MG: 1 TABLET ORAL at 09:06

## 2017-08-22 RX ADMIN — LIDOCAINE HYDROCHLORIDE 5 ML: 20 INJECTION, SOLUTION EPIDURAL; INFILTRATION; INTRACAUDAL; PERINEURAL at 17:18

## 2017-08-22 RX ADMIN — RIFAXIMIN 550 MG: 550 TABLET ORAL at 09:08

## 2017-08-22 RX ADMIN — OXYCODONE HYDROCHLORIDE AND ACETAMINOPHEN 1 TABLET: 10; 325 TABLET ORAL at 20:02

## 2017-08-22 RX ADMIN — MICONAZORB AF ANTIFUNGAL POWDER: 1.42 POWDER TOPICAL at 21:40

## 2017-08-22 RX ADMIN — INSULIN LISPRO 2 UNITS: 100 INJECTION, SOLUTION INTRAVENOUS; SUBCUTANEOUS at 13:04

## 2017-08-22 RX ADMIN — KETAMINE HYDROCHLORIDE 25 MG: 100 INJECTION INTRAMUSCULAR; INTRAVENOUS at 16:41

## 2017-08-22 RX ADMIN — FENTANYL CITRATE 50 MCG: 50 INJECTION, SOLUTION INTRAMUSCULAR; INTRAVENOUS at 17:18

## 2017-08-22 RX ADMIN — KETAMINE HYDROCHLORIDE 25 MG: 100 INJECTION INTRAMUSCULAR; INTRAVENOUS at 17:02

## 2017-08-22 RX ADMIN — CLONAZEPAM 1 MG: 0.5 TABLET ORAL at 09:15

## 2017-08-22 RX ADMIN — DIPHENHYDRAMINE HCL 25 MG: 25 TABLET ORAL at 21:29

## 2017-08-22 RX ADMIN — TAMSULOSIN HYDROCHLORIDE 0.4 MG: 0.4 CAPSULE ORAL at 09:07

## 2017-08-22 RX ADMIN — HYDROMORPHONE HYDROCHLORIDE 0.5 MG: 2 INJECTION, SOLUTION INTRAMUSCULAR; INTRAVENOUS; SUBCUTANEOUS at 17:54

## 2017-08-22 RX ADMIN — PREGABALIN 150 MG: 75 CAPSULE ORAL at 09:15

## 2017-08-22 RX ADMIN — VANCOMYCIN HYDROCHLORIDE 1250 MG: 5 INJECTION, POWDER, LYOPHILIZED, FOR SOLUTION INTRAVENOUS at 08:45

## 2017-08-22 RX ADMIN — INSULIN LISPRO 1 UNITS: 100 INJECTION, SOLUTION INTRAVENOUS; SUBCUTANEOUS at 21:39

## 2017-08-22 RX ADMIN — OXYCODONE HYDROCHLORIDE AND ACETAMINOPHEN 1 TABLET: 10; 325 TABLET ORAL at 02:20

## 2017-08-22 RX ADMIN — MIDAZOLAM HYDROCHLORIDE 2 MG: 1 INJECTION, SOLUTION INTRAMUSCULAR; INTRAVENOUS at 16:38

## 2017-08-22 RX ADMIN — MORPHINE SULFATE 2 MG: 2 INJECTION, SOLUTION INTRAMUSCULAR; INTRAVENOUS at 11:57

## 2017-08-22 RX ADMIN — DULOXETINE HYDROCHLORIDE 60 MG: 60 CAPSULE, DELAYED RELEASE ORAL at 09:06

## 2017-08-22 RX ADMIN — FENTANYL CITRATE 50 MCG: 50 INJECTION, SOLUTION INTRAMUSCULAR; INTRAVENOUS at 17:11

## 2017-08-22 RX ADMIN — KETAMINE HYDROCHLORIDE 25 MG: 100 INJECTION INTRAMUSCULAR; INTRAVENOUS at 17:20

## 2017-08-22 RX ADMIN — FERROUS SULFATE TAB EC 325 MG (65 MG FE EQUIVALENT) 325 MG: 325 (65 FE) TABLET DELAYED RESPONSE at 09:18

## 2017-08-22 RX ADMIN — Medication: at 09:27

## 2017-08-22 RX ADMIN — NADOLOL 40 MG: 20 TABLET ORAL at 09:07

## 2017-08-22 RX ADMIN — FENTANYL CITRATE 25 MCG: 50 INJECTION INTRAMUSCULAR; INTRAVENOUS at 18:14

## 2017-08-22 RX ADMIN — VENLAFAXINE HYDROCHLORIDE 150 MG: 75 CAPSULE, EXTENDED RELEASE ORAL at 21:29

## 2017-08-22 RX ADMIN — SODIUM CHLORIDE: 9 INJECTION, SOLUTION INTRAVENOUS at 13:04

## 2017-08-22 RX ADMIN — Medication 10 ML: at 21:25

## 2017-08-22 RX ADMIN — PREGABALIN 150 MG: 75 CAPSULE ORAL at 21:37

## 2017-08-22 RX ADMIN — LEVOTHYROXINE SODIUM 150 MCG: 150 TABLET ORAL at 06:09

## 2017-08-22 RX ADMIN — MORPHINE SULFATE 2 MG: 2 INJECTION, SOLUTION INTRAMUSCULAR; INTRAVENOUS at 15:30

## 2017-08-22 RX ADMIN — Medication 10 ML: at 09:08

## 2017-08-22 RX ADMIN — RIFAXIMIN 550 MG: 550 TABLET ORAL at 21:29

## 2017-08-22 ASSESSMENT — PAIN DESCRIPTION - PAIN TYPE
TYPE: SURGICAL PAIN
TYPE: ACUTE PAIN;CHRONIC PAIN
TYPE: SURGICAL PAIN
TYPE: ACUTE PAIN;CHRONIC PAIN

## 2017-08-22 ASSESSMENT — PAIN DESCRIPTION - LOCATION
LOCATION: FOOT;LEG;KNEE
LOCATION: FOOT
LOCATION: FOOT
LOCATION: FOOT;KNEE;LEG

## 2017-08-22 ASSESSMENT — PAIN DESCRIPTION - ORIENTATION
ORIENTATION: RIGHT;LEFT

## 2017-08-22 ASSESSMENT — PAIN SCALES - GENERAL
PAINLEVEL_OUTOF10: 10
PAINLEVEL_OUTOF10: 9
PAINLEVEL_OUTOF10: 9
PAINLEVEL_OUTOF10: 7
PAINLEVEL_OUTOF10: 8
PAINLEVEL_OUTOF10: 9
PAINLEVEL_OUTOF10: 10
PAINLEVEL_OUTOF10: 10
PAINLEVEL_OUTOF10: 8
PAINLEVEL_OUTOF10: 9
PAINLEVEL_OUTOF10: 9
PAINLEVEL_OUTOF10: 10
PAINLEVEL_OUTOF10: 9
PAINLEVEL_OUTOF10: 9
PAINLEVEL_OUTOF10: 10
PAINLEVEL_OUTOF10: 7

## 2017-08-22 ASSESSMENT — PAIN DESCRIPTION - FREQUENCY
FREQUENCY: CONTINUOUS
FREQUENCY: CONTINUOUS

## 2017-08-22 ASSESSMENT — PAIN DESCRIPTION - DESCRIPTORS
DESCRIPTORS: THROBBING
DESCRIPTORS: THROBBING

## 2017-08-23 LAB
ANION GAP SERPL CALCULATED.3IONS-SCNC: 7 MMOL/L (ref 9–17)
BUN BLDV-MCNC: 9 MG/DL (ref 6–20)
BUN/CREAT BLD: 9 (ref 9–20)
CALCIUM SERPL-MCNC: 8.9 MG/DL (ref 8.6–10.4)
CHLORIDE BLD-SCNC: 90 MMOL/L (ref 98–107)
CO2: 38 MMOL/L (ref 20–31)
CREAT SERPL-MCNC: 0.95 MG/DL (ref 0.7–1.2)
GFR AFRICAN AMERICAN: >60 ML/MIN
GFR NON-AFRICAN AMERICAN: >60 ML/MIN
GFR SERPL CREATININE-BSD FRML MDRD: ABNORMAL ML/MIN/{1.73_M2}
GFR SERPL CREATININE-BSD FRML MDRD: ABNORMAL ML/MIN/{1.73_M2}
GLUCOSE BLD-MCNC: 125 MG/DL (ref 75–110)
GLUCOSE BLD-MCNC: 136 MG/DL (ref 70–99)
GLUCOSE BLD-MCNC: 144 MG/DL (ref 75–110)
GLUCOSE BLD-MCNC: 162 MG/DL (ref 75–110)
GLUCOSE BLD-MCNC: 217 MG/DL (ref 75–110)
POTASSIUM SERPL-SCNC: 3.7 MMOL/L (ref 3.7–5.3)
SODIUM BLD-SCNC: 135 MMOL/L (ref 135–144)

## 2017-08-23 PROCEDURE — 97530 THERAPEUTIC ACTIVITIES: CPT

## 2017-08-23 PROCEDURE — 2580000003 HC RX 258: Performed by: INTERNAL MEDICINE

## 2017-08-23 PROCEDURE — 36415 COLL VENOUS BLD VENIPUNCTURE: CPT

## 2017-08-23 PROCEDURE — 97110 THERAPEUTIC EXERCISES: CPT

## 2017-08-23 PROCEDURE — 99232 SBSQ HOSP IP/OBS MODERATE 35: CPT | Performed by: INTERNAL MEDICINE

## 2017-08-23 PROCEDURE — 6360000002 HC RX W HCPCS: Performed by: FAMILY MEDICINE

## 2017-08-23 PROCEDURE — 6370000000 HC RX 637 (ALT 250 FOR IP): Performed by: INTERNAL MEDICINE

## 2017-08-23 PROCEDURE — 82947 ASSAY GLUCOSE BLOOD QUANT: CPT

## 2017-08-23 PROCEDURE — 6360000002 HC RX W HCPCS: Performed by: NURSE PRACTITIONER

## 2017-08-23 PROCEDURE — 2060000000 HC ICU INTERMEDIATE R&B

## 2017-08-23 PROCEDURE — 6360000002 HC RX W HCPCS: Performed by: INTERNAL MEDICINE

## 2017-08-23 PROCEDURE — 80048 BASIC METABOLIC PNL TOTAL CA: CPT

## 2017-08-23 RX ORDER — MORPHINE SULFATE 4 MG/ML
4 INJECTION, SOLUTION INTRAMUSCULAR; INTRAVENOUS EVERY 4 HOURS PRN
Status: COMPLETED | OUTPATIENT
Start: 2017-08-23 | End: 2017-08-24

## 2017-08-23 RX ORDER — FUROSEMIDE 10 MG/ML
40 INJECTION INTRAMUSCULAR; INTRAVENOUS ONCE
Status: COMPLETED | OUTPATIENT
Start: 2017-08-23 | End: 2017-08-23

## 2017-08-23 RX ADMIN — MORPHINE SULFATE 4 MG: 4 INJECTION, SOLUTION INTRAMUSCULAR; INTRAVENOUS at 05:36

## 2017-08-23 RX ADMIN — NADOLOL 40 MG: 20 TABLET ORAL at 09:54

## 2017-08-23 RX ADMIN — MORPHINE SULFATE 4 MG: 4 INJECTION, SOLUTION INTRAMUSCULAR; INTRAVENOUS at 01:08

## 2017-08-23 RX ADMIN — CLONAZEPAM 1 MG: 0.5 TABLET ORAL at 10:03

## 2017-08-23 RX ADMIN — BUMETANIDE 2 MG: 1 TABLET ORAL at 20:30

## 2017-08-23 RX ADMIN — INSULIN LISPRO 1 UNITS: 100 INJECTION, SOLUTION INTRAVENOUS; SUBCUTANEOUS at 20:30

## 2017-08-23 RX ADMIN — QUETIAPINE 150 MG: 150 TABLET, EXTENDED RELEASE ORAL at 23:09

## 2017-08-23 RX ADMIN — PREGABALIN 150 MG: 75 CAPSULE ORAL at 20:30

## 2017-08-23 RX ADMIN — BUMETANIDE 2 MG: 1 TABLET ORAL at 09:54

## 2017-08-23 RX ADMIN — CEFAZOLIN SODIUM 2 G: 2 SOLUTION INTRAVENOUS at 23:10

## 2017-08-23 RX ADMIN — MORPHINE SULFATE 4 MG: 4 INJECTION, SOLUTION INTRAMUSCULAR; INTRAVENOUS at 22:46

## 2017-08-23 RX ADMIN — MORPHINE SULFATE 4 MG: 4 INJECTION, SOLUTION INTRAMUSCULAR; INTRAVENOUS at 08:09

## 2017-08-23 RX ADMIN — ENOXAPARIN SODIUM 40 MG: 40 INJECTION SUBCUTANEOUS at 09:53

## 2017-08-23 RX ADMIN — OXYCODONE HYDROCHLORIDE AND ACETAMINOPHEN 1 TABLET: 10; 325 TABLET ORAL at 20:32

## 2017-08-23 RX ADMIN — FERROUS SULFATE TAB EC 325 MG (65 MG FE EQUIVALENT) 325 MG: 325 (65 FE) TABLET DELAYED RESPONSE at 09:55

## 2017-08-23 RX ADMIN — PANTOPRAZOLE SODIUM 40 MG: 40 TABLET, DELAYED RELEASE ORAL at 05:36

## 2017-08-23 RX ADMIN — CEFAZOLIN SODIUM 2 G: 2 SOLUTION INTRAVENOUS at 15:08

## 2017-08-23 RX ADMIN — PREGABALIN 150 MG: 75 CAPSULE ORAL at 10:03

## 2017-08-23 RX ADMIN — OXYCODONE HYDROCHLORIDE AND ACETAMINOPHEN 1 TABLET: 10; 325 TABLET ORAL at 11:03

## 2017-08-23 RX ADMIN — OXYCODONE HYDROCHLORIDE AND ACETAMINOPHEN 1 TABLET: 10; 325 TABLET ORAL at 15:40

## 2017-08-23 RX ADMIN — MICONAZORB AF ANTIFUNGAL POWDER: 1.42 POWDER TOPICAL at 10:01

## 2017-08-23 RX ADMIN — ASPIRIN 81 MG: 81 TABLET, COATED ORAL at 09:55

## 2017-08-23 RX ADMIN — VANCOMYCIN HYDROCHLORIDE 1250 MG: 5 INJECTION, POWDER, LYOPHILIZED, FOR SOLUTION INTRAVENOUS at 10:15

## 2017-08-23 RX ADMIN — DIPHENHYDRAMINE HCL 25 MG: 25 TABLET ORAL at 21:47

## 2017-08-23 RX ADMIN — MICONAZORB AF ANTIFUNGAL POWDER: 1.42 POWDER TOPICAL at 21:00

## 2017-08-23 RX ADMIN — ENOXAPARIN SODIUM 40 MG: 40 INJECTION SUBCUTANEOUS at 20:30

## 2017-08-23 RX ADMIN — SPIRONOLACTONE 75 MG: 25 TABLET, FILM COATED ORAL at 09:54

## 2017-08-23 RX ADMIN — MORPHINE SULFATE 4 MG: 4 INJECTION, SOLUTION INTRAMUSCULAR; INTRAVENOUS at 03:35

## 2017-08-23 RX ADMIN — TAMSULOSIN HYDROCHLORIDE 0.4 MG: 0.4 CAPSULE ORAL at 09:56

## 2017-08-23 RX ADMIN — INSULIN LISPRO 4 UNITS: 100 INJECTION, SOLUTION INTRAVENOUS; SUBCUTANEOUS at 13:48

## 2017-08-23 RX ADMIN — RIFAXIMIN 550 MG: 550 TABLET ORAL at 09:56

## 2017-08-23 RX ADMIN — CLONAZEPAM 1 MG: 0.5 TABLET ORAL at 23:09

## 2017-08-23 RX ADMIN — SPIRONOLACTONE 75 MG: 25 TABLET, FILM COATED ORAL at 17:44

## 2017-08-23 RX ADMIN — RIFAXIMIN 550 MG: 550 TABLET ORAL at 20:30

## 2017-08-23 RX ADMIN — LEVOTHYROXINE SODIUM 150 MCG: 150 TABLET ORAL at 05:36

## 2017-08-23 RX ADMIN — VENLAFAXINE HYDROCHLORIDE 150 MG: 75 CAPSULE, EXTENDED RELEASE ORAL at 20:30

## 2017-08-23 RX ADMIN — FERROUS SULFATE TAB EC 325 MG (65 MG FE EQUIVALENT) 325 MG: 325 (65 FE) TABLET DELAYED RESPONSE at 17:44

## 2017-08-23 RX ADMIN — METFORMIN HYDROCHLORIDE 1000 MG: 500 TABLET, FILM COATED ORAL at 17:44

## 2017-08-23 RX ADMIN — FUROSEMIDE 40 MG: 10 INJECTION, SOLUTION INTRAMUSCULAR; INTRAVENOUS at 12:46

## 2017-08-23 RX ADMIN — DULOXETINE HYDROCHLORIDE 60 MG: 60 CAPSULE, DELAYED RELEASE ORAL at 09:54

## 2017-08-23 RX ADMIN — METFORMIN HYDROCHLORIDE 1000 MG: 500 TABLET, FILM COATED ORAL at 09:54

## 2017-08-23 RX ADMIN — DIPHENHYDRAMINE HCL 25 MG: 25 TABLET ORAL at 09:54

## 2017-08-23 ASSESSMENT — PAIN DESCRIPTION - PROGRESSION
CLINICAL_PROGRESSION: NOT CHANGED
CLINICAL_PROGRESSION: NOT CHANGED

## 2017-08-23 ASSESSMENT — PAIN DESCRIPTION - DESCRIPTORS
DESCRIPTORS: THROBBING
DESCRIPTORS: THROBBING
DESCRIPTORS: THROBBING;POUNDING

## 2017-08-23 ASSESSMENT — PAIN DESCRIPTION - LOCATION
LOCATION: FOOT

## 2017-08-23 ASSESSMENT — PAIN DESCRIPTION - PAIN TYPE
TYPE: SURGICAL PAIN

## 2017-08-23 ASSESSMENT — PAIN SCALES - GENERAL
PAINLEVEL_OUTOF10: 10
PAINLEVEL_OUTOF10: 7
PAINLEVEL_OUTOF10: 7
PAINLEVEL_OUTOF10: 10
PAINLEVEL_OUTOF10: 10
PAINLEVEL_OUTOF10: 7
PAINLEVEL_OUTOF10: 8
PAINLEVEL_OUTOF10: 9
PAINLEVEL_OUTOF10: 9
PAINLEVEL_OUTOF10: 3
PAINLEVEL_OUTOF10: 9
PAINLEVEL_OUTOF10: 9

## 2017-08-23 ASSESSMENT — PAIN DESCRIPTION - ORIENTATION
ORIENTATION: RIGHT;LEFT

## 2017-08-23 ASSESSMENT — PAIN DESCRIPTION - ONSET
ONSET: ON-GOING
ONSET: ON-GOING

## 2017-08-23 ASSESSMENT — PAIN DESCRIPTION - FREQUENCY
FREQUENCY: CONTINUOUS

## 2017-08-24 LAB
CULTURE: NORMAL
GLUCOSE BLD-MCNC: 109 MG/DL (ref 75–110)
GLUCOSE BLD-MCNC: 131 MG/DL (ref 75–110)
GLUCOSE BLD-MCNC: 176 MG/DL (ref 75–110)
GLUCOSE BLD-MCNC: 207 MG/DL (ref 75–110)
Lab: NORMAL
Lab: NORMAL
SPECIMEN DESCRIPTION: NORMAL
STATUS: NORMAL
STATUS: NORMAL
SURGICAL PATHOLOGY REPORT: NORMAL

## 2017-08-24 PROCEDURE — 6360000002 HC RX W HCPCS: Performed by: INTERNAL MEDICINE

## 2017-08-24 PROCEDURE — 97530 THERAPEUTIC ACTIVITIES: CPT

## 2017-08-24 PROCEDURE — 6370000000 HC RX 637 (ALT 250 FOR IP): Performed by: INTERNAL MEDICINE

## 2017-08-24 PROCEDURE — 82947 ASSAY GLUCOSE BLOOD QUANT: CPT

## 2017-08-24 PROCEDURE — 6360000002 HC RX W HCPCS: Performed by: FAMILY MEDICINE

## 2017-08-24 PROCEDURE — 6360000002 HC RX W HCPCS: Performed by: NURSE PRACTITIONER

## 2017-08-24 PROCEDURE — 97530 THERAPEUTIC ACTIVITIES: CPT | Performed by: NURSE PRACTITIONER

## 2017-08-24 PROCEDURE — 99232 SBSQ HOSP IP/OBS MODERATE 35: CPT | Performed by: INTERNAL MEDICINE

## 2017-08-24 PROCEDURE — 97110 THERAPEUTIC EXERCISES: CPT

## 2017-08-24 PROCEDURE — 97535 SELF CARE MNGMENT TRAINING: CPT | Performed by: NURSE PRACTITIONER

## 2017-08-24 PROCEDURE — 2060000000 HC ICU INTERMEDIATE R&B

## 2017-08-24 PROCEDURE — 2500000003 HC RX 250 WO HCPCS: Performed by: INTERNAL MEDICINE

## 2017-08-24 RX ORDER — MORPHINE SULFATE 2 MG/ML
2 INJECTION, SOLUTION INTRAMUSCULAR; INTRAVENOUS EVERY 4 HOURS PRN
Status: DISCONTINUED | OUTPATIENT
Start: 2017-08-24 | End: 2017-08-26 | Stop reason: HOSPADM

## 2017-08-24 RX ADMIN — FERROUS SULFATE TAB EC 325 MG (65 MG FE EQUIVALENT) 325 MG: 325 (65 FE) TABLET DELAYED RESPONSE at 09:27

## 2017-08-24 RX ADMIN — FERROUS SULFATE TAB EC 325 MG (65 MG FE EQUIVALENT) 325 MG: 325 (65 FE) TABLET DELAYED RESPONSE at 17:12

## 2017-08-24 RX ADMIN — VENLAFAXINE HYDROCHLORIDE 150 MG: 75 CAPSULE, EXTENDED RELEASE ORAL at 20:44

## 2017-08-24 RX ADMIN — SPIRONOLACTONE 75 MG: 25 TABLET, FILM COATED ORAL at 09:05

## 2017-08-24 RX ADMIN — RIFAXIMIN 550 MG: 550 TABLET ORAL at 20:44

## 2017-08-24 RX ADMIN — CLONAZEPAM 1 MG: 0.5 TABLET ORAL at 09:05

## 2017-08-24 RX ADMIN — METFORMIN HYDROCHLORIDE 1000 MG: 500 TABLET, FILM COATED ORAL at 09:05

## 2017-08-24 RX ADMIN — OXYCODONE HYDROCHLORIDE AND ACETAMINOPHEN 1 TABLET: 10; 325 TABLET ORAL at 13:04

## 2017-08-24 RX ADMIN — OXYCODONE HYDROCHLORIDE AND ACETAMINOPHEN 1 TABLET: 10; 325 TABLET ORAL at 09:06

## 2017-08-24 RX ADMIN — CLONAZEPAM 1 MG: 0.5 TABLET ORAL at 22:40

## 2017-08-24 RX ADMIN — ENOXAPARIN SODIUM 40 MG: 40 INJECTION SUBCUTANEOUS at 21:30

## 2017-08-24 RX ADMIN — NADOLOL 40 MG: 20 TABLET ORAL at 09:05

## 2017-08-24 RX ADMIN — OXYCODONE HYDROCHLORIDE AND ACETAMINOPHEN 1 TABLET: 10; 325 TABLET ORAL at 03:01

## 2017-08-24 RX ADMIN — MICONAZORB AF ANTIFUNGAL POWDER: 1.42 POWDER TOPICAL at 09:46

## 2017-08-24 RX ADMIN — ENOXAPARIN SODIUM 40 MG: 40 INJECTION SUBCUTANEOUS at 09:05

## 2017-08-24 RX ADMIN — BUMETANIDE 2 MG: 1 TABLET ORAL at 09:05

## 2017-08-24 RX ADMIN — MICONAZORB AF ANTIFUNGAL POWDER: 1.42 POWDER TOPICAL at 21:30

## 2017-08-24 RX ADMIN — QUETIAPINE 150 MG: 150 TABLET, EXTENDED RELEASE ORAL at 22:40

## 2017-08-24 RX ADMIN — DIPHENHYDRAMINE HCL 25 MG: 25 TABLET ORAL at 22:40

## 2017-08-24 RX ADMIN — DULOXETINE HYDROCHLORIDE 60 MG: 60 CAPSULE, DELAYED RELEASE ORAL at 09:05

## 2017-08-24 RX ADMIN — METFORMIN HYDROCHLORIDE 1000 MG: 500 TABLET, FILM COATED ORAL at 17:12

## 2017-08-24 RX ADMIN — MORPHINE SULFATE 2 MG: 2 INJECTION, SOLUTION INTRAMUSCULAR; INTRAVENOUS at 22:34

## 2017-08-24 RX ADMIN — PANTOPRAZOLE SODIUM 40 MG: 40 TABLET, DELAYED RELEASE ORAL at 06:32

## 2017-08-24 RX ADMIN — PREGABALIN 150 MG: 75 CAPSULE ORAL at 20:44

## 2017-08-24 RX ADMIN — INSULIN LISPRO 1 UNITS: 100 INJECTION, SOLUTION INTRAVENOUS; SUBCUTANEOUS at 20:44

## 2017-08-24 RX ADMIN — LEVOTHYROXINE SODIUM 150 MCG: 150 TABLET ORAL at 06:32

## 2017-08-24 RX ADMIN — CEFAZOLIN SODIUM 2 G: 2 SOLUTION INTRAVENOUS at 14:42

## 2017-08-24 RX ADMIN — PREGABALIN 150 MG: 75 CAPSULE ORAL at 09:05

## 2017-08-24 RX ADMIN — DIPHENHYDRAMINE HCL 25 MG: 25 TABLET ORAL at 10:10

## 2017-08-24 RX ADMIN — ASPIRIN 81 MG: 81 TABLET, COATED ORAL at 09:06

## 2017-08-24 RX ADMIN — OXYCODONE HYDROCHLORIDE AND ACETAMINOPHEN 1 TABLET: 10; 325 TABLET ORAL at 23:34

## 2017-08-24 RX ADMIN — CEFAZOLIN SODIUM 2 G: 2 SOLUTION INTRAVENOUS at 22:40

## 2017-08-24 RX ADMIN — MORPHINE SULFATE 2 MG: 2 INJECTION, SOLUTION INTRAMUSCULAR; INTRAVENOUS at 13:41

## 2017-08-24 RX ADMIN — INSULIN LISPRO 4 UNITS: 100 INJECTION, SOLUTION INTRAVENOUS; SUBCUTANEOUS at 12:40

## 2017-08-24 RX ADMIN — MORPHINE SULFATE 4 MG: 4 INJECTION, SOLUTION INTRAMUSCULAR; INTRAVENOUS at 05:09

## 2017-08-24 RX ADMIN — MORPHINE SULFATE 2 MG: 2 INJECTION, SOLUTION INTRAMUSCULAR; INTRAVENOUS at 18:32

## 2017-08-24 RX ADMIN — CEFAZOLIN SODIUM 2 G: 2 SOLUTION INTRAVENOUS at 06:32

## 2017-08-24 RX ADMIN — OXYCODONE HYDROCHLORIDE AND ACETAMINOPHEN 1 TABLET: 10; 325 TABLET ORAL at 17:12

## 2017-08-24 RX ADMIN — TAMSULOSIN HYDROCHLORIDE 0.4 MG: 0.4 CAPSULE ORAL at 09:05

## 2017-08-24 RX ADMIN — RIFAXIMIN 550 MG: 550 TABLET ORAL at 09:05

## 2017-08-24 RX ADMIN — BUMETANIDE 2 MG: 1 TABLET ORAL at 17:12

## 2017-08-24 RX ADMIN — SPIRONOLACTONE 75 MG: 25 TABLET, FILM COATED ORAL at 17:12

## 2017-08-24 ASSESSMENT — PAIN SCALES - GENERAL
PAINLEVEL_OUTOF10: 10
PAINLEVEL_OUTOF10: 8
PAINLEVEL_OUTOF10: 8
PAINLEVEL_OUTOF10: 7
PAINLEVEL_OUTOF10: 9
PAINLEVEL_OUTOF10: 9
PAINLEVEL_OUTOF10: 6
PAINLEVEL_OUTOF10: 7
PAINLEVEL_OUTOF10: 7
PAINLEVEL_OUTOF10: 8

## 2017-08-24 ASSESSMENT — PAIN DESCRIPTION - ORIENTATION: ORIENTATION: RIGHT;LEFT

## 2017-08-24 ASSESSMENT — PAIN DESCRIPTION - PAIN TYPE: TYPE: SURGICAL PAIN

## 2017-08-24 ASSESSMENT — PAIN DESCRIPTION - LOCATION: LOCATION: FOOT

## 2017-08-25 LAB
GLUCOSE BLD-MCNC: 115 MG/DL (ref 75–110)
GLUCOSE BLD-MCNC: 128 MG/DL (ref 75–110)
GLUCOSE BLD-MCNC: 132 MG/DL (ref 75–110)
GLUCOSE BLD-MCNC: 144 MG/DL (ref 75–110)

## 2017-08-25 PROCEDURE — 2700000000 HC OXYGEN THERAPY PER DAY

## 2017-08-25 PROCEDURE — 99232 SBSQ HOSP IP/OBS MODERATE 35: CPT | Performed by: INTERNAL MEDICINE

## 2017-08-25 PROCEDURE — 2500000003 HC RX 250 WO HCPCS: Performed by: INTERNAL MEDICINE

## 2017-08-25 PROCEDURE — 6370000000 HC RX 637 (ALT 250 FOR IP): Performed by: INTERNAL MEDICINE

## 2017-08-25 PROCEDURE — 2060000000 HC ICU INTERMEDIATE R&B

## 2017-08-25 PROCEDURE — 97168 OT RE-EVAL EST PLAN CARE: CPT

## 2017-08-25 PROCEDURE — 97110 THERAPEUTIC EXERCISES: CPT

## 2017-08-25 PROCEDURE — 97116 GAIT TRAINING THERAPY: CPT

## 2017-08-25 PROCEDURE — 97535 SELF CARE MNGMENT TRAINING: CPT

## 2017-08-25 PROCEDURE — 94760 N-INVAS EAR/PLS OXIMETRY 1: CPT

## 2017-08-25 PROCEDURE — 6360000002 HC RX W HCPCS: Performed by: INTERNAL MEDICINE

## 2017-08-25 PROCEDURE — G8987 SELF CARE CURRENT STATUS: HCPCS

## 2017-08-25 PROCEDURE — G8988 SELF CARE GOAL STATUS: HCPCS

## 2017-08-25 PROCEDURE — 97530 THERAPEUTIC ACTIVITIES: CPT

## 2017-08-25 PROCEDURE — 6360000002 HC RX W HCPCS: Performed by: NURSE PRACTITIONER

## 2017-08-25 PROCEDURE — 82947 ASSAY GLUCOSE BLOOD QUANT: CPT

## 2017-08-25 RX ORDER — OXYCODONE HYDROCHLORIDE AND ACETAMINOPHEN 5; 325 MG/1; MG/1
1 TABLET ORAL EVERY 6 HOURS PRN
Qty: 30 TABLET | Refills: 0 | Status: SHIPPED | OUTPATIENT
Start: 2017-08-25 | End: 2017-09-01

## 2017-08-25 RX ADMIN — MORPHINE SULFATE 2 MG: 2 INJECTION, SOLUTION INTRAMUSCULAR; INTRAVENOUS at 15:18

## 2017-08-25 RX ADMIN — ENOXAPARIN SODIUM 40 MG: 40 INJECTION SUBCUTANEOUS at 20:42

## 2017-08-25 RX ADMIN — MORPHINE SULFATE 2 MG: 2 INJECTION, SOLUTION INTRAMUSCULAR; INTRAVENOUS at 20:49

## 2017-08-25 RX ADMIN — ENOXAPARIN SODIUM 40 MG: 40 INJECTION SUBCUTANEOUS at 09:05

## 2017-08-25 RX ADMIN — OXYCODONE HYDROCHLORIDE AND ACETAMINOPHEN 1 TABLET: 10; 325 TABLET ORAL at 23:29

## 2017-08-25 RX ADMIN — VENLAFAXINE HYDROCHLORIDE 150 MG: 75 CAPSULE, EXTENDED RELEASE ORAL at 20:43

## 2017-08-25 RX ADMIN — CLONAZEPAM 1 MG: 0.5 TABLET ORAL at 23:30

## 2017-08-25 RX ADMIN — CLONAZEPAM 1 MG: 0.5 TABLET ORAL at 09:04

## 2017-08-25 RX ADMIN — PANTOPRAZOLE SODIUM 40 MG: 40 TABLET, DELAYED RELEASE ORAL at 06:13

## 2017-08-25 RX ADMIN — INSULIN LISPRO 1 UNITS: 100 INJECTION, SOLUTION INTRAVENOUS; SUBCUTANEOUS at 21:31

## 2017-08-25 RX ADMIN — MORPHINE SULFATE 2 MG: 2 INJECTION, SOLUTION INTRAMUSCULAR; INTRAVENOUS at 08:09

## 2017-08-25 RX ADMIN — MICONAZORB AF ANTIFUNGAL POWDER: 1.42 POWDER TOPICAL at 20:42

## 2017-08-25 RX ADMIN — NADOLOL 40 MG: 20 TABLET ORAL at 09:05

## 2017-08-25 RX ADMIN — DULOXETINE HYDROCHLORIDE 60 MG: 60 CAPSULE, DELAYED RELEASE ORAL at 09:05

## 2017-08-25 RX ADMIN — FERROUS SULFATE TAB EC 325 MG (65 MG FE EQUIVALENT) 325 MG: 325 (65 FE) TABLET DELAYED RESPONSE at 17:30

## 2017-08-25 RX ADMIN — QUETIAPINE 150 MG: 150 TABLET, EXTENDED RELEASE ORAL at 23:29

## 2017-08-25 RX ADMIN — MICONAZORB AF ANTIFUNGAL POWDER: 1.42 POWDER TOPICAL at 09:04

## 2017-08-25 RX ADMIN — OXYCODONE HYDROCHLORIDE AND ACETAMINOPHEN 1 TABLET: 10; 325 TABLET ORAL at 18:21

## 2017-08-25 RX ADMIN — OXYCODONE HYDROCHLORIDE AND ACETAMINOPHEN 1 TABLET: 10; 325 TABLET ORAL at 06:13

## 2017-08-25 RX ADMIN — OXYCODONE HYDROCHLORIDE AND ACETAMINOPHEN 1 TABLET: 10; 325 TABLET ORAL at 11:11

## 2017-08-25 RX ADMIN — ASPIRIN 81 MG: 81 TABLET, COATED ORAL at 09:05

## 2017-08-25 RX ADMIN — RIFAXIMIN 550 MG: 550 TABLET ORAL at 09:51

## 2017-08-25 RX ADMIN — CEFAZOLIN SODIUM 2 G: 2 SOLUTION INTRAVENOUS at 23:21

## 2017-08-25 RX ADMIN — CEFAZOLIN SODIUM 2 G: 2 SOLUTION INTRAVENOUS at 06:30

## 2017-08-25 RX ADMIN — RIFAXIMIN 550 MG: 550 TABLET ORAL at 20:42

## 2017-08-25 RX ADMIN — PREGABALIN 150 MG: 75 CAPSULE ORAL at 09:04

## 2017-08-25 RX ADMIN — BUMETANIDE 2 MG: 1 TABLET ORAL at 17:30

## 2017-08-25 RX ADMIN — MORPHINE SULFATE 2 MG: 2 INJECTION, SOLUTION INTRAMUSCULAR; INTRAVENOUS at 02:44

## 2017-08-25 RX ADMIN — CEFAZOLIN SODIUM 2 G: 2 SOLUTION INTRAVENOUS at 15:18

## 2017-08-25 RX ADMIN — METFORMIN HYDROCHLORIDE 1000 MG: 500 TABLET, FILM COATED ORAL at 09:05

## 2017-08-25 RX ADMIN — PREGABALIN 150 MG: 75 CAPSULE ORAL at 20:42

## 2017-08-25 RX ADMIN — FERROUS SULFATE TAB EC 325 MG (65 MG FE EQUIVALENT) 325 MG: 325 (65 FE) TABLET DELAYED RESPONSE at 09:04

## 2017-08-25 RX ADMIN — TAMSULOSIN HYDROCHLORIDE 0.4 MG: 0.4 CAPSULE ORAL at 09:05

## 2017-08-25 RX ADMIN — SPIRONOLACTONE 75 MG: 25 TABLET, FILM COATED ORAL at 17:30

## 2017-08-25 RX ADMIN — BUMETANIDE 2 MG: 1 TABLET ORAL at 09:05

## 2017-08-25 RX ADMIN — DIPHENHYDRAMINE HCL 25 MG: 25 TABLET ORAL at 15:18

## 2017-08-25 RX ADMIN — SPIRONOLACTONE 75 MG: 25 TABLET, FILM COATED ORAL at 09:05

## 2017-08-25 RX ADMIN — METFORMIN HYDROCHLORIDE 1000 MG: 500 TABLET, FILM COATED ORAL at 17:30

## 2017-08-25 RX ADMIN — DIPHENHYDRAMINE HCL 25 MG: 25 TABLET ORAL at 23:29

## 2017-08-25 RX ADMIN — LEVOTHYROXINE SODIUM 150 MCG: 150 TABLET ORAL at 06:13

## 2017-08-25 ASSESSMENT — PAIN SCALES - GENERAL
PAINLEVEL_OUTOF10: 8
PAINLEVEL_OUTOF10: 8
PAINLEVEL_OUTOF10: 7
PAINLEVEL_OUTOF10: 7
PAINLEVEL_OUTOF10: 9
PAINLEVEL_OUTOF10: 8
PAINLEVEL_OUTOF10: 6
PAINLEVEL_OUTOF10: 8
PAINLEVEL_OUTOF10: 7
PAINLEVEL_OUTOF10: 7

## 2017-08-25 ASSESSMENT — PAIN DESCRIPTION - PROGRESSION: CLINICAL_PROGRESSION: NOT CHANGED

## 2017-08-25 ASSESSMENT — PAIN DESCRIPTION - LOCATION: LOCATION: FOOT

## 2017-08-25 ASSESSMENT — PAIN DESCRIPTION - ORIENTATION: ORIENTATION: RIGHT;LEFT

## 2017-08-25 ASSESSMENT — PAIN DESCRIPTION - PAIN TYPE
TYPE: SURGICAL PAIN
TYPE: SURGICAL PAIN

## 2017-08-26 VITALS
OXYGEN SATURATION: 100 % | WEIGHT: 315 LBS | TEMPERATURE: 97.7 F | DIASTOLIC BLOOD PRESSURE: 62 MMHG | BODY MASS INDEX: 44.1 KG/M2 | HEART RATE: 68 BPM | SYSTOLIC BLOOD PRESSURE: 115 MMHG | RESPIRATION RATE: 22 BRPM | HEIGHT: 71 IN

## 2017-08-26 LAB
GLUCOSE BLD-MCNC: 124 MG/DL (ref 75–110)
GLUCOSE BLD-MCNC: 140 MG/DL (ref 75–110)
GLUCOSE BLD-MCNC: 163 MG/DL (ref 75–110)

## 2017-08-26 PROCEDURE — 2580000003 HC RX 258: Performed by: INTERNAL MEDICINE

## 2017-08-26 PROCEDURE — 6370000000 HC RX 637 (ALT 250 FOR IP): Performed by: INTERNAL MEDICINE

## 2017-08-26 PROCEDURE — 97116 GAIT TRAINING THERAPY: CPT

## 2017-08-26 PROCEDURE — 97535 SELF CARE MNGMENT TRAINING: CPT

## 2017-08-26 PROCEDURE — 6360000002 HC RX W HCPCS: Performed by: NURSE PRACTITIONER

## 2017-08-26 PROCEDURE — 6360000002 HC RX W HCPCS: Performed by: INTERNAL MEDICINE

## 2017-08-26 PROCEDURE — 97530 THERAPEUTIC ACTIVITIES: CPT

## 2017-08-26 PROCEDURE — 99239 HOSP IP/OBS DSCHRG MGMT >30: CPT | Performed by: INTERNAL MEDICINE

## 2017-08-26 PROCEDURE — 82947 ASSAY GLUCOSE BLOOD QUANT: CPT

## 2017-08-26 RX ORDER — CEPHALEXIN 500 MG/1
500 CAPSULE ORAL 4 TIMES DAILY
Qty: 40 CAPSULE | Refills: 0 | Status: SHIPPED | OUTPATIENT
Start: 2017-08-26 | End: 2017-09-05

## 2017-08-26 RX ADMIN — TAMSULOSIN HYDROCHLORIDE 0.4 MG: 0.4 CAPSULE ORAL at 09:50

## 2017-08-26 RX ADMIN — ENOXAPARIN SODIUM 40 MG: 40 INJECTION SUBCUTANEOUS at 09:24

## 2017-08-26 RX ADMIN — MORPHINE SULFATE 2 MG: 2 INJECTION, SOLUTION INTRAMUSCULAR; INTRAVENOUS at 02:27

## 2017-08-26 RX ADMIN — Medication 10 ML: at 09:51

## 2017-08-26 RX ADMIN — OXYCODONE HYDROCHLORIDE AND ACETAMINOPHEN 1 TABLET: 10; 325 TABLET ORAL at 14:25

## 2017-08-26 RX ADMIN — DULOXETINE HYDROCHLORIDE 60 MG: 60 CAPSULE, DELAYED RELEASE ORAL at 09:33

## 2017-08-26 RX ADMIN — NADOLOL 40 MG: 20 TABLET ORAL at 09:24

## 2017-08-26 RX ADMIN — ASPIRIN 81 MG: 81 TABLET, COATED ORAL at 09:23

## 2017-08-26 RX ADMIN — INSULIN LISPRO 2 UNITS: 100 INJECTION, SOLUTION INTRAVENOUS; SUBCUTANEOUS at 12:09

## 2017-08-26 RX ADMIN — LEVOTHYROXINE SODIUM 150 MCG: 150 TABLET ORAL at 09:23

## 2017-08-26 RX ADMIN — MICONAZORB AF ANTIFUNGAL POWDER: 1.42 POWDER TOPICAL at 09:51

## 2017-08-26 RX ADMIN — RIFAXIMIN 550 MG: 550 TABLET ORAL at 09:23

## 2017-08-26 RX ADMIN — BUMETANIDE 2 MG: 1 TABLET ORAL at 09:22

## 2017-08-26 RX ADMIN — DIPHENHYDRAMINE HCL 25 MG: 25 TABLET ORAL at 15:52

## 2017-08-26 RX ADMIN — METFORMIN HYDROCHLORIDE 1000 MG: 500 TABLET, FILM COATED ORAL at 18:28

## 2017-08-26 RX ADMIN — CLONAZEPAM 1 MG: 0.5 TABLET ORAL at 09:24

## 2017-08-26 RX ADMIN — MORPHINE SULFATE 2 MG: 2 INJECTION, SOLUTION INTRAMUSCULAR; INTRAVENOUS at 06:52

## 2017-08-26 RX ADMIN — SPIRONOLACTONE 75 MG: 25 TABLET, FILM COATED ORAL at 09:23

## 2017-08-26 RX ADMIN — METFORMIN HYDROCHLORIDE 1000 MG: 500 TABLET, FILM COATED ORAL at 09:24

## 2017-08-26 RX ADMIN — PANTOPRAZOLE SODIUM 40 MG: 40 TABLET, DELAYED RELEASE ORAL at 09:24

## 2017-08-26 RX ADMIN — FERROUS SULFATE TAB EC 325 MG (65 MG FE EQUIVALENT) 325 MG: 325 (65 FE) TABLET DELAYED RESPONSE at 09:23

## 2017-08-26 RX ADMIN — PREGABALIN 150 MG: 75 CAPSULE ORAL at 09:23

## 2017-08-26 RX ADMIN — OXYCODONE HYDROCHLORIDE AND ACETAMINOPHEN 1 TABLET: 10; 325 TABLET ORAL at 03:37

## 2017-08-26 RX ADMIN — OXYCODONE HYDROCHLORIDE AND ACETAMINOPHEN 1 TABLET: 10; 325 TABLET ORAL at 09:50

## 2017-08-26 RX ADMIN — BUMETANIDE 2 MG: 1 TABLET ORAL at 18:28

## 2017-08-26 RX ADMIN — CEFAZOLIN SODIUM 2 G: 2 SOLUTION INTRAVENOUS at 06:20

## 2017-08-26 RX ADMIN — ONDANSETRON 4 MG: 2 INJECTION INTRAMUSCULAR; INTRAVENOUS at 11:45

## 2017-08-26 ASSESSMENT — PAIN SCALES - GENERAL
PAINLEVEL_OUTOF10: 9
PAINLEVEL_OUTOF10: 7
PAINLEVEL_OUTOF10: 8
PAINLEVEL_OUTOF10: 7
PAINLEVEL_OUTOF10: 8

## 2017-08-27 LAB
CULTURE: ABNORMAL
CULTURE: NORMAL
CULTURE: NORMAL
DIRECT EXAM: ABNORMAL
DIRECT EXAM: ABNORMAL
DIRECT EXAM: NORMAL
DIRECT EXAM: NORMAL
Lab: ABNORMAL
Lab: NORMAL
SPECIMEN DESCRIPTION: ABNORMAL
SPECIMEN DESCRIPTION: ABNORMAL
SPECIMEN DESCRIPTION: NORMAL
SPECIMEN DESCRIPTION: NORMAL
STATUS: ABNORMAL
STATUS: NORMAL

## 2017-12-07 ENCOUNTER — APPOINTMENT (OUTPATIENT)
Dept: ULTRASOUND IMAGING | Age: 52
DRG: 603 | End: 2017-12-07
Payer: MEDICARE

## 2017-12-07 ENCOUNTER — HOSPITAL ENCOUNTER (INPATIENT)
Age: 52
LOS: 5 days | Discharge: HOME HEALTH | DRG: 603 | End: 2017-12-12
Admitting: INTERNAL MEDICINE
Payer: MEDICARE

## 2017-12-07 ENCOUNTER — APPOINTMENT (OUTPATIENT)
Dept: GENERAL RADIOLOGY | Age: 52
DRG: 603 | End: 2017-12-07
Payer: MEDICARE

## 2017-12-07 ENCOUNTER — APPOINTMENT (OUTPATIENT)
Dept: CT IMAGING | Age: 52
DRG: 603 | End: 2017-12-07
Payer: MEDICARE

## 2017-12-07 DIAGNOSIS — R10.9 ABDOMINAL PAIN, UNSPECIFIED ABDOMINAL LOCATION: ICD-10-CM

## 2017-12-07 DIAGNOSIS — L03.315 CELLULITIS OF PERINEUM: Primary | ICD-10-CM

## 2017-12-07 DIAGNOSIS — R31.9 URINARY TRACT INFECTION WITH HEMATURIA, SITE UNSPECIFIED: ICD-10-CM

## 2017-12-07 DIAGNOSIS — N39.0 URINARY TRACT INFECTION WITH HEMATURIA, SITE UNSPECIFIED: ICD-10-CM

## 2017-12-07 PROBLEM — L03.90 CELLULITIS: Status: ACTIVE | Noted: 2017-12-07

## 2017-12-07 LAB
-: ABNORMAL
ABSOLUTE EOS #: 0.1 K/UL (ref 0–0.4)
ABSOLUTE IMMATURE GRANULOCYTE: ABNORMAL K/UL (ref 0–0.3)
ABSOLUTE LYMPH #: 0.9 K/UL (ref 1–4.8)
ABSOLUTE MONO #: 0.8 K/UL (ref 0.2–0.8)
ALBUMIN SERPL-MCNC: 3.3 G/DL (ref 3.5–5.2)
ALBUMIN/GLOBULIN RATIO: ABNORMAL (ref 1–2.5)
ALP BLD-CCNC: 134 U/L (ref 40–129)
ALT SERPL-CCNC: 19 U/L (ref 5–41)
AMORPHOUS: ABNORMAL
AMYLASE: 24 U/L (ref 28–100)
ANION GAP SERPL CALCULATED.3IONS-SCNC: 14 MMOL/L (ref 9–17)
AST SERPL-CCNC: 15 U/L
BACTERIA: ABNORMAL
BASOPHILS # BLD: 1 % (ref 0–2)
BASOPHILS ABSOLUTE: 0.1 K/UL (ref 0–0.2)
BILIRUB SERPL-MCNC: 0.65 MG/DL (ref 0.3–1.2)
BILIRUBIN DIRECT: 0.31 MG/DL
BILIRUBIN URINE: NEGATIVE
BILIRUBIN, INDIRECT: 0.34 MG/DL (ref 0–1)
BNP INTERPRETATION: ABNORMAL
BUN BLDV-MCNC: 12 MG/DL (ref 6–20)
BUN/CREAT BLD: 11 (ref 9–20)
CALCIUM SERPL-MCNC: 8.9 MG/DL (ref 8.6–10.4)
CASTS UA: ABNORMAL /LPF
CHLORIDE BLD-SCNC: 90 MMOL/L (ref 98–107)
CO2: 27 MMOL/L (ref 20–31)
COLOR: YELLOW
COMMENT UA: ABNORMAL
CREAT SERPL-MCNC: 1.11 MG/DL (ref 0.7–1.2)
CRYSTALS, UA: ABNORMAL /HPF
DIFFERENTIAL TYPE: ABNORMAL
EKG ATRIAL RATE: 90 BPM
EKG P AXIS: 55 DEGREES
EKG P-R INTERVAL: 148 MS
EKG Q-T INTERVAL: 372 MS
EKG QRS DURATION: 98 MS
EKG QTC CALCULATION (BAZETT): 455 MS
EKG R AXIS: -16 DEGREES
EKG T AXIS: 34 DEGREES
EKG VENTRICULAR RATE: 90 BPM
EOSINOPHILS RELATIVE PERCENT: 1 % (ref 1–4)
EPITHELIAL CELLS UA: ABNORMAL /HPF (ref 0–5)
GFR AFRICAN AMERICAN: >60 ML/MIN
GFR NON-AFRICAN AMERICAN: >60 ML/MIN
GFR SERPL CREATININE-BSD FRML MDRD: ABNORMAL ML/MIN/{1.73_M2}
GFR SERPL CREATININE-BSD FRML MDRD: ABNORMAL ML/MIN/{1.73_M2}
GLOBULIN: ABNORMAL G/DL (ref 1.5–3.8)
GLUCOSE BLD-MCNC: 158 MG/DL (ref 70–99)
GLUCOSE URINE: NEGATIVE
HCT VFR BLD CALC: 35.4 % (ref 41–53)
HEMOGLOBIN: 11.3 G/DL (ref 13.5–17.5)
IMMATURE GRANULOCYTES: ABNORMAL %
INR BLD: 1
KETONES, URINE: NEGATIVE
LACTIC ACID: 0.8 MMOL/L (ref 0.5–2.2)
LACTIC ACID: 1.9 MMOL/L (ref 0.5–2.2)
LEUKOCYTE ESTERASE, URINE: ABNORMAL
LIPASE: 21 U/L (ref 13–60)
LYMPHOCYTES # BLD: 7 % (ref 24–44)
MCH RBC QN AUTO: 29.2 PG (ref 26–34)
MCHC RBC AUTO-ENTMCNC: 32 G/DL (ref 31–37)
MCV RBC AUTO: 91.4 FL (ref 80–100)
MONOCYTES # BLD: 6 % (ref 1–7)
MUCUS: ABNORMAL
MYOGLOBIN: 44 NG/ML (ref 28–72)
MYOGLOBIN: 44 NG/ML (ref 28–72)
NITRITE, URINE: NEGATIVE
OTHER OBSERVATIONS UA: ABNORMAL
PARTIAL THROMBOPLASTIN TIME: 29.4 SEC (ref 23–31)
PDW BLD-RTO: 15.6 % (ref 11.5–14.5)
PH UA: 6 (ref 5–8)
PLATELET # BLD: 152 K/UL (ref 130–400)
PLATELET ESTIMATE: ABNORMAL
PMV BLD AUTO: 7.8 FL (ref 6–12)
POTASSIUM SERPL-SCNC: 3.9 MMOL/L (ref 3.7–5.3)
PRO-BNP: 371 PG/ML
PROTEIN UA: NEGATIVE
PROTHROMBIN TIME: 10.2 SEC (ref 9.7–11.6)
RBC # BLD: 3.87 M/UL (ref 4.5–5.9)
RBC # BLD: ABNORMAL 10*6/UL
RBC UA: ABNORMAL /HPF (ref 0–2)
RENAL EPITHELIAL, UA: ABNORMAL /HPF
SEG NEUTROPHILS: 85 % (ref 36–66)
SEGMENTED NEUTROPHILS ABSOLUTE COUNT: 11.1 K/UL (ref 1.8–7.7)
SODIUM BLD-SCNC: 131 MMOL/L (ref 135–144)
SPECIFIC GRAVITY UA: 1.01 (ref 1–1.03)
TOTAL PROTEIN: 7.3 G/DL (ref 6.4–8.3)
TRICHOMONAS: ABNORMAL
TROPONIN INTERP: NORMAL
TROPONIN INTERP: NORMAL
TROPONIN T: <0.03 NG/ML
TROPONIN T: <0.03 NG/ML
TURBIDITY: ABNORMAL
URINE HGB: ABNORMAL
UROBILINOGEN, URINE: NORMAL
WBC # BLD: 13.1 K/UL (ref 3.5–11)
WBC # BLD: ABNORMAL 10*3/UL
WBC UA: ABNORMAL /HPF (ref 0–5)
YEAST: ABNORMAL

## 2017-12-07 PROCEDURE — 85610 PROTHROMBIN TIME: CPT

## 2017-12-07 PROCEDURE — 96374 THER/PROPH/DIAG INJ IV PUSH: CPT

## 2017-12-07 PROCEDURE — 83690 ASSAY OF LIPASE: CPT

## 2017-12-07 PROCEDURE — 82947 ASSAY GLUCOSE BLOOD QUANT: CPT

## 2017-12-07 PROCEDURE — 96376 TX/PRO/DX INJ SAME DRUG ADON: CPT

## 2017-12-07 PROCEDURE — 87088 URINE BACTERIA CULTURE: CPT

## 2017-12-07 PROCEDURE — 2580000003 HC RX 258: Performed by: NURSE PRACTITIONER

## 2017-12-07 PROCEDURE — 2500000003 HC RX 250 WO HCPCS: Performed by: NURSE PRACTITIONER

## 2017-12-07 PROCEDURE — 87086 URINE CULTURE/COLONY COUNT: CPT

## 2017-12-07 PROCEDURE — 76870 US EXAM SCROTUM: CPT

## 2017-12-07 PROCEDURE — 87186 SC STD MICRODIL/AGAR DIL: CPT

## 2017-12-07 PROCEDURE — 83605 ASSAY OF LACTIC ACID: CPT

## 2017-12-07 PROCEDURE — 80048 BASIC METABOLIC PNL TOTAL CA: CPT

## 2017-12-07 PROCEDURE — 93976 VASCULAR STUDY: CPT

## 2017-12-07 PROCEDURE — 2060000000 HC ICU INTERMEDIATE R&B

## 2017-12-07 PROCEDURE — 93005 ELECTROCARDIOGRAM TRACING: CPT

## 2017-12-07 PROCEDURE — 82150 ASSAY OF AMYLASE: CPT

## 2017-12-07 PROCEDURE — 84484 ASSAY OF TROPONIN QUANT: CPT

## 2017-12-07 PROCEDURE — 71010 XR CHEST PORTABLE: CPT

## 2017-12-07 PROCEDURE — 83874 ASSAY OF MYOGLOBIN: CPT

## 2017-12-07 PROCEDURE — 80076 HEPATIC FUNCTION PANEL: CPT

## 2017-12-07 PROCEDURE — 6370000000 HC RX 637 (ALT 250 FOR IP): Performed by: NURSE PRACTITIONER

## 2017-12-07 PROCEDURE — 85025 COMPLETE CBC W/AUTO DIFF WBC: CPT

## 2017-12-07 PROCEDURE — 83036 HEMOGLOBIN GLYCOSYLATED A1C: CPT

## 2017-12-07 PROCEDURE — 85730 THROMBOPLASTIN TIME PARTIAL: CPT

## 2017-12-07 PROCEDURE — 74177 CT ABD & PELVIS W/CONTRAST: CPT

## 2017-12-07 PROCEDURE — 51798 US URINE CAPACITY MEASURE: CPT

## 2017-12-07 PROCEDURE — 99285 EMERGENCY DEPT VISIT HI MDM: CPT

## 2017-12-07 PROCEDURE — 6360000004 HC RX CONTRAST MEDICATION: Performed by: NURSE PRACTITIONER

## 2017-12-07 PROCEDURE — 81001 URINALYSIS AUTO W/SCOPE: CPT

## 2017-12-07 PROCEDURE — 51702 INSERT TEMP BLADDER CATH: CPT

## 2017-12-07 PROCEDURE — 83880 ASSAY OF NATRIURETIC PEPTIDE: CPT

## 2017-12-07 PROCEDURE — 96361 HYDRATE IV INFUSION ADD-ON: CPT

## 2017-12-07 PROCEDURE — 87040 BLOOD CULTURE FOR BACTERIA: CPT

## 2017-12-07 PROCEDURE — 96375 TX/PRO/DX INJ NEW DRUG ADDON: CPT

## 2017-12-07 PROCEDURE — 6360000002 HC RX W HCPCS: Performed by: NURSE PRACTITIONER

## 2017-12-07 RX ORDER — ACETAMINOPHEN 325 MG/1
650 TABLET ORAL EVERY 4 HOURS PRN
Status: DISCONTINUED | OUTPATIENT
Start: 2017-12-07 | End: 2017-12-12 | Stop reason: HOSPADM

## 2017-12-07 RX ORDER — VENLAFAXINE HYDROCHLORIDE 75 MG/1
150 CAPSULE, EXTENDED RELEASE ORAL DAILY
Status: DISCONTINUED | OUTPATIENT
Start: 2017-12-08 | End: 2017-12-12 | Stop reason: HOSPADM

## 2017-12-07 RX ORDER — SODIUM CHLORIDE 9 MG/ML
INJECTION, SOLUTION INTRAVENOUS CONTINUOUS
Status: DISCONTINUED | OUTPATIENT
Start: 2017-12-07 | End: 2017-12-07

## 2017-12-07 RX ORDER — INSULIN GLARGINE 100 [IU]/ML
30 INJECTION, SOLUTION SUBCUTANEOUS DAILY
Status: DISCONTINUED | OUTPATIENT
Start: 2017-12-08 | End: 2017-12-12 | Stop reason: HOSPADM

## 2017-12-07 RX ORDER — DEXTROSE MONOHYDRATE 25 G/50ML
12.5 INJECTION, SOLUTION INTRAVENOUS PRN
Status: DISCONTINUED | OUTPATIENT
Start: 2017-12-07 | End: 2017-12-12 | Stop reason: HOSPADM

## 2017-12-07 RX ORDER — POTASSIUM CHLORIDE 20MEQ/15ML
40 LIQUID (ML) ORAL PRN
Status: DISCONTINUED | OUTPATIENT
Start: 2017-12-07 | End: 2017-12-12 | Stop reason: HOSPADM

## 2017-12-07 RX ORDER — QUETIAPINE 150 MG/1
150 TABLET, FILM COATED, EXTENDED RELEASE ORAL NIGHTLY
Status: DISCONTINUED | OUTPATIENT
Start: 2017-12-07 | End: 2017-12-12 | Stop reason: HOSPADM

## 2017-12-07 RX ORDER — DEXTROSE MONOHYDRATE 50 MG/ML
100 INJECTION, SOLUTION INTRAVENOUS PRN
Status: DISCONTINUED | OUTPATIENT
Start: 2017-12-07 | End: 2017-12-12 | Stop reason: HOSPADM

## 2017-12-07 RX ORDER — DULOXETIN HYDROCHLORIDE 60 MG/1
60 CAPSULE, DELAYED RELEASE ORAL DAILY
Status: DISCONTINUED | OUTPATIENT
Start: 2017-12-08 | End: 2017-12-12 | Stop reason: HOSPADM

## 2017-12-07 RX ORDER — NADOLOL 20 MG/1
40 TABLET ORAL DAILY
Status: DISCONTINUED | OUTPATIENT
Start: 2017-12-08 | End: 2017-12-12 | Stop reason: HOSPADM

## 2017-12-07 RX ORDER — MORPHINE SULFATE 4 MG/ML
4 INJECTION, SOLUTION INTRAMUSCULAR; INTRAVENOUS
Status: DISCONTINUED | OUTPATIENT
Start: 2017-12-07 | End: 2017-12-10

## 2017-12-07 RX ORDER — ASPIRIN 81 MG/1
81 TABLET ORAL DAILY
Status: DISCONTINUED | OUTPATIENT
Start: 2017-12-08 | End: 2017-12-12 | Stop reason: HOSPADM

## 2017-12-07 RX ORDER — LEVOTHYROXINE SODIUM 0.15 MG/1
150 TABLET ORAL DAILY
Status: DISCONTINUED | OUTPATIENT
Start: 2017-12-08 | End: 2017-12-12 | Stop reason: HOSPADM

## 2017-12-07 RX ORDER — PANTOPRAZOLE SODIUM 40 MG/1
40 TABLET, DELAYED RELEASE ORAL
Status: DISCONTINUED | OUTPATIENT
Start: 2017-12-08 | End: 2017-12-12 | Stop reason: HOSPADM

## 2017-12-07 RX ORDER — SODIUM CHLORIDE 9 MG/ML
INJECTION, SOLUTION INTRAVENOUS CONTINUOUS
Status: DISCONTINUED | OUTPATIENT
Start: 2017-12-07 | End: 2017-12-08

## 2017-12-07 RX ORDER — SODIUM CHLORIDE 0.9 % (FLUSH) 0.9 %
10 SYRINGE (ML) INJECTION EVERY 12 HOURS SCHEDULED
Status: DISCONTINUED | OUTPATIENT
Start: 2017-12-07 | End: 2017-12-12 | Stop reason: HOSPADM

## 2017-12-07 RX ORDER — ONDANSETRON 2 MG/ML
4 INJECTION INTRAMUSCULAR; INTRAVENOUS EVERY 6 HOURS PRN
Status: DISCONTINUED | OUTPATIENT
Start: 2017-12-07 | End: 2017-12-12 | Stop reason: HOSPADM

## 2017-12-07 RX ORDER — HYDROCODONE BITARTRATE AND ACETAMINOPHEN 5; 325 MG/1; MG/1
1 TABLET ORAL EVERY 4 HOURS PRN
Status: DISCONTINUED | OUTPATIENT
Start: 2017-12-07 | End: 2017-12-12 | Stop reason: HOSPADM

## 2017-12-07 RX ORDER — MORPHINE SULFATE 4 MG/ML
4 INJECTION, SOLUTION INTRAMUSCULAR; INTRAVENOUS ONCE
Status: COMPLETED | OUTPATIENT
Start: 2017-12-07 | End: 2017-12-07

## 2017-12-07 RX ORDER — ASPIRIN 81 MG/1
324 TABLET, CHEWABLE ORAL ONCE
Status: COMPLETED | OUTPATIENT
Start: 2017-12-07 | End: 2017-12-07

## 2017-12-07 RX ORDER — BISACODYL 10 MG
10 SUPPOSITORY, RECTAL RECTAL DAILY PRN
Status: DISCONTINUED | OUTPATIENT
Start: 2017-12-07 | End: 2017-12-12 | Stop reason: HOSPADM

## 2017-12-07 RX ORDER — MORPHINE SULFATE 2 MG/ML
2 INJECTION, SOLUTION INTRAMUSCULAR; INTRAVENOUS
Status: DISCONTINUED | OUTPATIENT
Start: 2017-12-07 | End: 2017-12-10

## 2017-12-07 RX ORDER — SODIUM CHLORIDE 0.9 % (FLUSH) 0.9 %
10 SYRINGE (ML) INJECTION PRN
Status: DISCONTINUED | OUTPATIENT
Start: 2017-12-07 | End: 2017-12-12 | Stop reason: HOSPADM

## 2017-12-07 RX ORDER — PREGABALIN 75 MG/1
150 CAPSULE ORAL 2 TIMES DAILY
Status: DISCONTINUED | OUTPATIENT
Start: 2017-12-07 | End: 2017-12-12 | Stop reason: HOSPADM

## 2017-12-07 RX ORDER — NICOTINE POLACRILEX 4 MG
15 LOZENGE BUCCAL PRN
Status: DISCONTINUED | OUTPATIENT
Start: 2017-12-07 | End: 2017-12-12 | Stop reason: HOSPADM

## 2017-12-07 RX ORDER — TAMSULOSIN HYDROCHLORIDE 0.4 MG/1
0.4 CAPSULE ORAL DAILY
Status: DISCONTINUED | OUTPATIENT
Start: 2017-12-08 | End: 2017-12-12 | Stop reason: HOSPADM

## 2017-12-07 RX ORDER — FERROUS SULFATE 325(65) MG
325 TABLET ORAL 2 TIMES DAILY
COMMUNITY
End: 2019-04-11

## 2017-12-07 RX ORDER — LANOLIN ALCOHOL/MO/W.PET/CERES
325 CREAM (GRAM) TOPICAL 2 TIMES DAILY
Status: DISCONTINUED | OUTPATIENT
Start: 2017-12-07 | End: 2017-12-12 | Stop reason: HOSPADM

## 2017-12-07 RX ORDER — HYDROCODONE BITARTRATE AND ACETAMINOPHEN 5; 325 MG/1; MG/1
2 TABLET ORAL EVERY 4 HOURS PRN
Status: DISCONTINUED | OUTPATIENT
Start: 2017-12-07 | End: 2017-12-12 | Stop reason: HOSPADM

## 2017-12-07 RX ORDER — POTASSIUM CHLORIDE 20 MEQ/1
40 TABLET, EXTENDED RELEASE ORAL PRN
Status: DISCONTINUED | OUTPATIENT
Start: 2017-12-07 | End: 2017-12-12 | Stop reason: HOSPADM

## 2017-12-07 RX ORDER — SPIRONOLACTONE 25 MG/1
25 TABLET ORAL 2 TIMES DAILY
Status: DISCONTINUED | OUTPATIENT
Start: 2017-12-07 | End: 2017-12-07 | Stop reason: SDUPTHER

## 2017-12-07 RX ORDER — BUMETANIDE 1 MG/1
2 TABLET ORAL 2 TIMES DAILY
Status: DISCONTINUED | OUTPATIENT
Start: 2017-12-07 | End: 2017-12-12 | Stop reason: HOSPADM

## 2017-12-07 RX ORDER — ONDANSETRON 2 MG/ML
4 INJECTION INTRAMUSCULAR; INTRAVENOUS ONCE
Status: COMPLETED | OUTPATIENT
Start: 2017-12-07 | End: 2017-12-07

## 2017-12-07 RX ORDER — MAGNESIUM SULFATE 1 G/100ML
1 INJECTION INTRAVENOUS PRN
Status: DISCONTINUED | OUTPATIENT
Start: 2017-12-07 | End: 2017-12-12 | Stop reason: HOSPADM

## 2017-12-07 RX ORDER — NICOTINE 21 MG/24HR
1 PATCH, TRANSDERMAL 24 HOURS TRANSDERMAL DAILY PRN
Status: DISCONTINUED | OUTPATIENT
Start: 2017-12-07 | End: 2017-12-12 | Stop reason: HOSPADM

## 2017-12-07 RX ORDER — 0.9 % SODIUM CHLORIDE 0.9 %
50 INTRAVENOUS SOLUTION INTRAVENOUS ONCE
Status: COMPLETED | OUTPATIENT
Start: 2017-12-07 | End: 2017-12-07

## 2017-12-07 RX ORDER — SPIRONOLACTONE 25 MG/1
50 TABLET ORAL 2 TIMES DAILY
Status: DISCONTINUED | OUTPATIENT
Start: 2017-12-07 | End: 2017-12-07 | Stop reason: SDUPTHER

## 2017-12-07 RX ORDER — POTASSIUM CHLORIDE 7.45 MG/ML
10 INJECTION INTRAVENOUS PRN
Status: DISCONTINUED | OUTPATIENT
Start: 2017-12-07 | End: 2017-12-12 | Stop reason: HOSPADM

## 2017-12-07 RX ORDER — SPIRONOLACTONE 25 MG/1
75 TABLET ORAL 2 TIMES DAILY
Status: DISCONTINUED | OUTPATIENT
Start: 2017-12-07 | End: 2017-12-12 | Stop reason: HOSPADM

## 2017-12-07 RX ORDER — SODIUM CHLORIDE 0.9 % (FLUSH) 0.9 %
10 SYRINGE (ML) INJECTION PRN
Status: DISCONTINUED | OUTPATIENT
Start: 2017-12-07 | End: 2017-12-07

## 2017-12-07 RX ADMIN — QUETIAPINE 150 MG: 150 TABLET, EXTENDED RELEASE ORAL at 23:54

## 2017-12-07 RX ADMIN — SODIUM CHLORIDE 50 ML: 9 INJECTION, SOLUTION INTRAVENOUS at 18:24

## 2017-12-07 RX ADMIN — HYDROMORPHONE HYDROCHLORIDE 1 MG: 1 INJECTION, SOLUTION INTRAMUSCULAR; INTRAVENOUS; SUBCUTANEOUS at 19:32

## 2017-12-07 RX ADMIN — SODIUM CHLORIDE: 9 INJECTION, SOLUTION INTRAVENOUS at 19:19

## 2017-12-07 RX ADMIN — ASPIRIN 81 MG 324 MG: 81 TABLET ORAL at 18:37

## 2017-12-07 RX ADMIN — HYDROMORPHONE HYDROCHLORIDE 1 MG: 1 INJECTION, SOLUTION INTRAMUSCULAR; INTRAVENOUS; SUBCUTANEOUS at 18:04

## 2017-12-07 RX ADMIN — MORPHINE SULFATE 4 MG: 4 INJECTION, SOLUTION INTRAMUSCULAR; INTRAVENOUS at 17:34

## 2017-12-07 RX ADMIN — HYDROCODONE BITARTRATE AND ACETAMINOPHEN 2 TABLET: 5; 325 TABLET ORAL at 23:55

## 2017-12-07 RX ADMIN — RIFAXIMIN 550 MG: 550 TABLET ORAL at 23:55

## 2017-12-07 RX ADMIN — TAZOBACTAM SODIUM AND PIPERACILLIN SODIUM 3.38 G: 375; 3 INJECTION, SOLUTION INTRAVENOUS at 22:05

## 2017-12-07 RX ADMIN — WATER 2 G: 1 INJECTION INTRAMUSCULAR; INTRAVENOUS; SUBCUTANEOUS at 19:16

## 2017-12-07 RX ADMIN — LIDOCAINE HYDROCHLORIDE: 20 JELLY TOPICAL at 17:38

## 2017-12-07 RX ADMIN — Medication 10 ML: at 18:24

## 2017-12-07 RX ADMIN — ONDANSETRON 4 MG: 2 INJECTION INTRAMUSCULAR; INTRAVENOUS at 17:32

## 2017-12-07 RX ADMIN — HYDROMORPHONE HYDROCHLORIDE 1 MG: 1 INJECTION, SOLUTION INTRAMUSCULAR; INTRAVENOUS; SUBCUTANEOUS at 20:22

## 2017-12-07 RX ADMIN — MICONAZOLE NITRATE: 20.6 POWDER TOPICAL at 23:56

## 2017-12-07 RX ADMIN — ONDANSETRON 4 MG: 2 INJECTION INTRAMUSCULAR; INTRAVENOUS at 20:21

## 2017-12-07 RX ADMIN — IOPAMIDOL 125 ML: 755 INJECTION, SOLUTION INTRAVENOUS at 18:24

## 2017-12-07 ASSESSMENT — ENCOUNTER SYMPTOMS
SHORTNESS OF BREATH: 1
ABDOMINAL DISTENTION: 1
VOMITING: 0
ABDOMINAL PAIN: 1
NAUSEA: 1

## 2017-12-07 ASSESSMENT — PAIN SCALES - GENERAL
PAINLEVEL_OUTOF10: 10
PAINLEVEL_OUTOF10: 8
PAINLEVEL_OUTOF10: 9
PAINLEVEL_OUTOF10: 8
PAINLEVEL_OUTOF10: 8

## 2017-12-07 ASSESSMENT — PAIN DESCRIPTION - DESCRIPTORS: DESCRIPTORS: CONSTANT

## 2017-12-07 ASSESSMENT — PAIN DESCRIPTION - LOCATION: LOCATION: GROIN;CHEST

## 2017-12-07 NOTE — ED PROVIDER NOTES
[address] [place] [address] ED  eMERGENCY dEPARTMENT eNCOUnter      Pt Name: Mariefr Rodriguez  MRN: [id number]  [place] 1965  Date of evaluation: 12/7/2017  Provider: Benedicto Howard NP    [address] [place] [address]       Chief Complaint   Patient presents with    Chest Pain    Groin Swelling    Leg Swelling    Shortness of Breath    Urinary Retention         HISTORY OF PRESENT ILLNESS  (Location/Symptom, Timing/Onset, Context/Setting, Quality, Duration, Modifying Factors, Severity.)   Marifer Rodriguez is a 47 y.o. male who presents to the emergency department By private auto for evaluation of chest pain, shortness of breath, abdominal pain, groin swelling and urinary retention. Patient states he has had intermittent chest pain and shortness of breath for the past week. Patient states he developed difficulty urinating earlier today. Patient states he was straining to avoid and felt a sharp pain in his lower abdomen. He has history of hernia surgery, alcohol cirrhosis, BPH, diabetes, pancreatitis, GERD, cardiac catheterization with no stents, IBS, chronic foot ulcers and cellulitis of his lower extremities. Patient has a visiting home nurse who changed the dressings on his lower extremities. Patient denies chest pain upon arrival to the ED. He complains of lower abdominal pain that radiates to his groin. His pain as a 10 out of 10 described as a sharp sensation. Patient states he does take Bumex and did take a dose of Bumex today. Patient states he quit drinking alcohol years ago. Nursing Notes were reviewed.     PAST MEDICAL HISTORY     Past Medical History:   Diagnosis Date    Anxiety and depression     Back pain, chronic     BPH (benign prostatic hyperplasia)     Cirrhosis ([address]     Diabetes mellitus ([address]     not checking bloodsugar at home    GERD (gastroesophageal reflux disease)     H/O cardiac catheterization not sure of date    no stents    Hiatal hernia     Hyperlipidemia     not taking any medication    UNIT/GM POWDER    Apply topically 2 times daily TO ABDOMINAL FOLDS, GROIN    PREGABALIN (LYRICA) 150 MG CAPSULE    Take 150 mg by mouth 2 times daily    QUETIAPINE FUMARATE (SEROQUEL XR) 150 MG TB24    Take 150 mg by mouth nightly. RIFAXIMIN (XIFAXAN) 550 MG TABLET    Take 550 mg by mouth 2 times daily    SITAGLIPTIN (JANUVIA) 100 MG TABLET    Take 1 tablet by mouth daily    SPIRONOLACTONE (ALDACTONE) 25 MG TABLET    Take 25 mg by mouth 2 times daily Indications: for 75 mg total BID     SPIRONOLACTONE (ALDACTONE) 50 MG TABLET    Take 50 mg by mouth 2 times daily Indications: FOR TOTAL 75MG BID    TAMSULOSIN (FLOMAX) 0.4 MG CAPSULE    Take 0.4 mg by mouth daily    VENLAFAXINE (EFFEXOR-XR) 150 MG XR CAPSULE    Take 150 mg by mouth daily. ALLERGIES     Review of patient's allergies indicates no known allergies. FAMILY HISTORY       Family History   Problem Relation Age of Onset    Adopted: Yes          SOCIAL HISTORY       Social History     Social History    Marital status:      Spouse name: N/A    Number of children: N/A    Years of education: N/A     Social History Main Topics    Smoking status: Never Smoker    Smokeless tobacco: Never Used    Alcohol use No      Comment: quit drinking 2012    Drug use: No    Sexual activity: Not Asked     Other Topics Concern    None     Social History Narrative    None         REVIEW OF SYSTEMS    (2-9 systems for level 4, 10 or more for level 5)     Review of Systems   Respiratory: Positive for shortness of breath. Cardiovascular: Positive for chest pain and leg swelling. Gastrointestinal: Positive for abdominal distention, abdominal pain and nausea. Negative for vomiting. Genitourinary: Positive for penile pain. All other systems reviewed and are negative. Except as noted above the remainder of the review of systems was reviewed and negative.      PHYSICAL EXAM    (up to 7 for level 4, 8 or more for level 5)     ED Triage Vitals Normal range of motion. Right lower leg: He exhibits no tenderness. Left lower leg: He exhibits no tenderness. Right foot: There is tenderness. Feet:    1+ edema noted bilateral lower extremities. There is no calf tenderness. No erythema to lower extremities. Neurological: He is alert and oriented to person, place, and time. He has normal strength and normal reflexes. No cranial nerve deficit or sensory deficit. Skin: Skin is warm and dry. Psychiatric: He has a normal mood and affect. His speech is normal and behavior is normal. Judgment and thought content normal. Cognition and memory are normal.         DIAGNOSTIC RESULTS     EKG: All EKG's are interpreted by the Emergency Department Physician who either signs or Co-signs this chart in the absence of a cardiologist.    EKG interpreted by attending physician. RADIOLOGY:   Non-plain film images such as CT, Ultrasound and MRI are read by the radiologist. Yudelka Lib radiographic images are visualized and preliminarily interpreted by the emergency physician with the below findings:    Us Scrotum And Testicles    Result Date: 12/7/2017  EXAMINATION: ULTRASOUND OF THE SCROTUM/TESTICLES WITH COLOR DOPPLER FLOW EVALUATION 12/7/2017 COMPARISON: None HISTORY: ORDERING SYSTEM PROVIDED HISTORY: Pain, swelling, rule out torsion FINDINGS: The examination demonstrated asymmetrically increased flow to the right testis and epididymis consistent with epididymal orchitis. There is a complex right hydrocele developed with multiple septation as well likely a sequela of infection. There is apparently a small epididymal cyst on the left side, measuring approximately 0.6 x 1.0 x 0.6 cm. The right testis measures approximately 2.9 x 3.0 x 3.7 cm. The left testis measures 2.8 x 2.6 x 4.1 cm. There is no hydrocele seen on the left side. There is no evidence of testicular torsion.   However, the dartos muscle and the skin are thickened on the right side - Abnormal; Notable for the following:     WBC 13.1 (*)     RBC 3.87 (*)     Hemoglobin 11.3 (*)     Hematocrit 35.4 (*)     RDW 15.6 (*)     Seg Neutrophils 85 (*)     Lymphocytes 7 (*)     Segs Absolute 11.10 (*)     Absolute Lymph # 0.90 (*)     All other components within normal limits   BASIC METABOLIC PANEL - Abnormal; Notable for the following:     Glucose 158 (*)     Sodium 131 (*)     Chloride 90 (*)     All other components within normal limits   HEPATIC FUNCTION PANEL - Abnormal; Notable for the following:     Alb 3.3 (*)     Alkaline Phosphatase 134 (*)     Bilirubin, Direct 0.31 (*)     All other components within normal limits   AMYLASE - Abnormal; Notable for the following:     Amylase 24 (*)     All other components within normal limits   BRAIN NATRIURETIC PEPTIDE - Abnormal; Notable for the following:     Pro- (*)     All other components within normal limits   MICROSCOPIC URINALYSIS - Abnormal; Notable for the following:     Bacteria, UA MODERATE (*)     All other components within normal limits   CULTURE, URINE CATHETER   CULTURE BLOOD #1   CULTURE BLOOD #1   LIPASE   TROP/MYOGLOBIN   TROP/MYOGLOBIN   APTT   PROTIME-INR   LACTIC ACID   LACTIC ACID       All other labs were within normal range or not returned as of this dictation. EMERGENCY DEPARTMENT COURSE and DIFFERENTIAL DIAGNOSIS/MDM:   Patient was evaluated in conjunction with attending physician. Labs reviewed. Troponin negative. WBC 13.1. Lactic acid 1.9. . Chest x-ray per radiologist shows no focal consolidation. CT of abdomen and pelvis per radiologist shows nonspecific, mild inflammatory change in the pelvis in the area of the posterior cul-de-sac is identified. There are no abnormal fluid or gas collections. No diverticular disease is appreciated. Similar appearance of the abdomen and pelvis compared to the 2014 evaluation in that there are 2 ventral fat containing hernias.   Ultrasound of scrotum and testicles

## 2017-12-08 PROBLEM — R33.9 URINE RETENTION: Status: ACTIVE | Noted: 2017-12-08

## 2017-12-08 PROBLEM — E66.01 MORBID OBESITY WITH BMI OF 50.0-59.9, ADULT (HCC): Chronic | Status: ACTIVE | Noted: 2017-12-08

## 2017-12-08 PROBLEM — L03.315 CELLULITIS OF PERINEUM: Status: ACTIVE | Noted: 2017-12-07

## 2017-12-08 LAB
ABSOLUTE EOS #: 0.1 K/UL (ref 0–0.4)
ABSOLUTE IMMATURE GRANULOCYTE: ABNORMAL K/UL (ref 0–0.3)
ABSOLUTE LYMPH #: 0.8 K/UL (ref 1–4.8)
ABSOLUTE MONO #: 0.7 K/UL (ref 0.2–0.8)
ALBUMIN SERPL-MCNC: 3.2 G/DL (ref 3.5–5.2)
ALBUMIN/GLOBULIN RATIO: ABNORMAL (ref 1–2.5)
ALP BLD-CCNC: 129 U/L (ref 40–129)
ALT SERPL-CCNC: 17 U/L (ref 5–41)
ANION GAP SERPL CALCULATED.3IONS-SCNC: 10 MMOL/L (ref 9–17)
AST SERPL-CCNC: 14 U/L
BASOPHILS # BLD: 1 % (ref 0–2)
BASOPHILS ABSOLUTE: 0.1 K/UL (ref 0–0.2)
BILIRUB SERPL-MCNC: 0.55 MG/DL (ref 0.3–1.2)
BUN BLDV-MCNC: 9 MG/DL (ref 6–20)
BUN/CREAT BLD: 9 (ref 9–20)
CALCIUM SERPL-MCNC: 8.8 MG/DL (ref 8.6–10.4)
CHLORIDE BLD-SCNC: 91 MMOL/L (ref 98–107)
CO2: 34 MMOL/L (ref 20–31)
CREAT SERPL-MCNC: 1.02 MG/DL (ref 0.7–1.2)
DIFFERENTIAL TYPE: ABNORMAL
EOSINOPHILS RELATIVE PERCENT: 2 % (ref 1–4)
ESTIMATED AVERAGE GLUCOSE: 148 MG/DL
GFR AFRICAN AMERICAN: >60 ML/MIN
GFR NON-AFRICAN AMERICAN: >60 ML/MIN
GFR SERPL CREATININE-BSD FRML MDRD: ABNORMAL ML/MIN/{1.73_M2}
GFR SERPL CREATININE-BSD FRML MDRD: ABNORMAL ML/MIN/{1.73_M2}
GLUCOSE BLD-MCNC: 130 MG/DL (ref 75–110)
GLUCOSE BLD-MCNC: 137 MG/DL (ref 70–99)
GLUCOSE BLD-MCNC: 144 MG/DL (ref 75–110)
GLUCOSE BLD-MCNC: 155 MG/DL (ref 75–110)
GLUCOSE BLD-MCNC: 220 MG/DL (ref 75–110)
GLUCOSE BLD-MCNC: 232 MG/DL (ref 75–110)
HBA1C MFR BLD: 6.8 % (ref 4–6)
HCT VFR BLD CALC: 35.2 % (ref 41–53)
HEMOGLOBIN: 11.1 G/DL (ref 13.5–17.5)
IMMATURE GRANULOCYTES: ABNORMAL %
LYMPHOCYTES # BLD: 9 % (ref 24–44)
MCH RBC QN AUTO: 29.1 PG (ref 26–34)
MCHC RBC AUTO-ENTMCNC: 31.6 G/DL (ref 31–37)
MCV RBC AUTO: 91.9 FL (ref 80–100)
MONOCYTES # BLD: 8 % (ref 1–7)
PDW BLD-RTO: 15.6 % (ref 11.5–14.5)
PLATELET # BLD: 142 K/UL (ref 130–400)
PLATELET ESTIMATE: ABNORMAL
PMV BLD AUTO: 7.6 FL (ref 6–12)
POTASSIUM SERPL-SCNC: 3.3 MMOL/L (ref 3.7–5.3)
RBC # BLD: 3.83 M/UL (ref 4.5–5.9)
RBC # BLD: ABNORMAL 10*6/UL
SEG NEUTROPHILS: 80 % (ref 36–66)
SEGMENTED NEUTROPHILS ABSOLUTE COUNT: 7.2 K/UL (ref 1.8–7.7)
SODIUM BLD-SCNC: 135 MMOL/L (ref 135–144)
TOTAL PROTEIN: 6.6 G/DL (ref 6.4–8.3)
WBC # BLD: 8.9 K/UL (ref 3.5–11)
WBC # BLD: ABNORMAL 10*3/UL

## 2017-12-08 PROCEDURE — 6370000000 HC RX 637 (ALT 250 FOR IP): Performed by: NURSE PRACTITIONER

## 2017-12-08 PROCEDURE — 6360000002 HC RX W HCPCS: Performed by: NURSE PRACTITIONER

## 2017-12-08 PROCEDURE — 99222 1ST HOSP IP/OBS MODERATE 55: CPT | Performed by: INTERNAL MEDICINE

## 2017-12-08 PROCEDURE — 85025 COMPLETE CBC W/AUTO DIFF WBC: CPT

## 2017-12-08 PROCEDURE — 97530 THERAPEUTIC ACTIVITIES: CPT

## 2017-12-08 PROCEDURE — 2500000003 HC RX 250 WO HCPCS: Performed by: NURSE PRACTITIONER

## 2017-12-08 PROCEDURE — 6370000000 HC RX 637 (ALT 250 FOR IP): Performed by: INTERNAL MEDICINE

## 2017-12-08 PROCEDURE — 80053 COMPREHEN METABOLIC PANEL: CPT

## 2017-12-08 PROCEDURE — 82947 ASSAY GLUCOSE BLOOD QUANT: CPT

## 2017-12-08 PROCEDURE — 36415 COLL VENOUS BLD VENIPUNCTURE: CPT

## 2017-12-08 PROCEDURE — 2580000003 HC RX 258: Performed by: NURSE PRACTITIONER

## 2017-12-08 PROCEDURE — 2060000000 HC ICU INTERMEDIATE R&B

## 2017-12-08 PROCEDURE — G8979 MOBILITY GOAL STATUS: HCPCS

## 2017-12-08 PROCEDURE — 97161 PT EVAL LOW COMPLEX 20 MIN: CPT

## 2017-12-08 PROCEDURE — G8978 MOBILITY CURRENT STATUS: HCPCS

## 2017-12-08 RX ORDER — INSULIN GLARGINE 100 [IU]/ML
30 INJECTION, SOLUTION SUBCUTANEOUS DAILY
COMMUNITY
End: 2019-04-11

## 2017-12-08 RX ORDER — OXYCODONE AND ACETAMINOPHEN 10; 325 MG/1; MG/1
1 TABLET ORAL EVERY 6 HOURS PRN
COMMUNITY
End: 2019-04-11

## 2017-12-08 RX ORDER — BUMETANIDE 2 MG/1
2 TABLET ORAL 2 TIMES DAILY
Status: ON HOLD | COMMUNITY
End: 2021-10-29 | Stop reason: HOSPADM

## 2017-12-08 RX ORDER — CLONAZEPAM 0.5 MG/1
1 TABLET ORAL 2 TIMES DAILY PRN
Status: DISCONTINUED | OUTPATIENT
Start: 2017-12-08 | End: 2017-12-12 | Stop reason: HOSPADM

## 2017-12-08 RX ORDER — OXYBUTYNIN CHLORIDE 5 MG/1
5 TABLET ORAL 2 TIMES DAILY
Status: DISCONTINUED | OUTPATIENT
Start: 2017-12-08 | End: 2017-12-12 | Stop reason: HOSPADM

## 2017-12-08 RX ORDER — LEVOFLOXACIN 750 MG/1
750 TABLET ORAL DAILY
Status: DISCONTINUED | OUTPATIENT
Start: 2017-12-08 | End: 2017-12-12 | Stop reason: HOSPADM

## 2017-12-08 RX ADMIN — PREGABALIN 150 MG: 75 CAPSULE ORAL at 21:38

## 2017-12-08 RX ADMIN — SODIUM CHLORIDE: 9 INJECTION, SOLUTION INTRAVENOUS at 00:01

## 2017-12-08 RX ADMIN — HYDROCODONE BITARTRATE AND ACETAMINOPHEN 2 TABLET: 5; 325 TABLET ORAL at 14:09

## 2017-12-08 RX ADMIN — HYDROCODONE BITARTRATE AND ACETAMINOPHEN 1 TABLET: 5; 325 TABLET ORAL at 05:59

## 2017-12-08 RX ADMIN — LEVOFLOXACIN 750 MG: 750 TABLET, FILM COATED ORAL at 19:28

## 2017-12-08 RX ADMIN — LINAGLIPTIN 5 MG: 5 TABLET, FILM COATED ORAL at 09:14

## 2017-12-08 RX ADMIN — INSULIN LISPRO 1 UNITS: 100 INJECTION, SOLUTION INTRAVENOUS; SUBCUTANEOUS at 00:02

## 2017-12-08 RX ADMIN — HYDROCODONE BITARTRATE AND ACETAMINOPHEN 2 TABLET: 5; 325 TABLET ORAL at 19:17

## 2017-12-08 RX ADMIN — HYDROCODONE BITARTRATE AND ACETAMINOPHEN 2 TABLET: 5; 325 TABLET ORAL at 23:18

## 2017-12-08 RX ADMIN — RIFAXIMIN 550 MG: 550 TABLET ORAL at 09:15

## 2017-12-08 RX ADMIN — ENOXAPARIN SODIUM 40 MG: 40 INJECTION SUBCUTANEOUS at 09:15

## 2017-12-08 RX ADMIN — INSULIN LISPRO 1 UNITS: 100 INJECTION, SOLUTION INTRAVENOUS; SUBCUTANEOUS at 21:39

## 2017-12-08 RX ADMIN — SPIRONOLACTONE 75 MG: 25 TABLET ORAL at 09:15

## 2017-12-08 RX ADMIN — HYDROCODONE BITARTRATE AND ACETAMINOPHEN 2 TABLET: 5; 325 TABLET ORAL at 10:19

## 2017-12-08 RX ADMIN — ASPIRIN 81 MG: 81 TABLET, COATED ORAL at 09:15

## 2017-12-08 RX ADMIN — MORPHINE SULFATE 2 MG: 2 INJECTION, SOLUTION INTRAMUSCULAR; INTRAVENOUS at 17:39

## 2017-12-08 RX ADMIN — NADOLOL 40 MG: 20 TABLET ORAL at 09:15

## 2017-12-08 RX ADMIN — LEVOTHYROXINE SODIUM 150 MCG: 150 TABLET ORAL at 05:59

## 2017-12-08 RX ADMIN — INSULIN GLARGINE 30 UNITS: 100 INJECTION, SOLUTION SUBCUTANEOUS at 09:15

## 2017-12-08 RX ADMIN — RIFAXIMIN 550 MG: 550 TABLET ORAL at 21:37

## 2017-12-08 RX ADMIN — PANTOPRAZOLE SODIUM 40 MG: 40 TABLET, DELAYED RELEASE ORAL at 05:59

## 2017-12-08 RX ADMIN — FERROUS SULFATE TAB EC 325 MG (65 MG FE EQUIVALENT) 325 MG: 325 (65 FE) TABLET DELAYED RESPONSE at 21:37

## 2017-12-08 RX ADMIN — MICONAZOLE NITRATE: 20.6 POWDER TOPICAL at 09:25

## 2017-12-08 RX ADMIN — DULOXETINE HYDROCHLORIDE 60 MG: 60 CAPSULE, DELAYED RELEASE ORAL at 09:14

## 2017-12-08 RX ADMIN — BUMETANIDE 2 MG: 1 TABLET ORAL at 09:15

## 2017-12-08 RX ADMIN — OXYBUTYNIN CHLORIDE 5 MG: 5 TABLET ORAL at 01:06

## 2017-12-08 RX ADMIN — SPIRONOLACTONE 75 MG: 25 TABLET ORAL at 00:00

## 2017-12-08 RX ADMIN — OXYBUTYNIN CHLORIDE 5 MG: 5 TABLET ORAL at 21:38

## 2017-12-08 RX ADMIN — PREGABALIN 150 MG: 75 CAPSULE ORAL at 00:00

## 2017-12-08 RX ADMIN — OXYBUTYNIN CHLORIDE 5 MG: 5 TABLET ORAL at 09:15

## 2017-12-08 RX ADMIN — MICONAZOLE NITRATE: 20.6 POWDER TOPICAL at 21:38

## 2017-12-08 RX ADMIN — BUMETANIDE 2 MG: 1 TABLET ORAL at 21:37

## 2017-12-08 RX ADMIN — MORPHINE SULFATE 2 MG: 2 INJECTION, SOLUTION INTRAMUSCULAR; INTRAVENOUS at 02:12

## 2017-12-08 RX ADMIN — SPIRONOLACTONE 75 MG: 25 TABLET ORAL at 17:36

## 2017-12-08 RX ADMIN — INSULIN LISPRO 2 UNITS: 100 INJECTION, SOLUTION INTRAVENOUS; SUBCUTANEOUS at 12:59

## 2017-12-08 RX ADMIN — FERROUS SULFATE TAB EC 325 MG (65 MG FE EQUIVALENT) 325 MG: 325 (65 FE) TABLET DELAYED RESPONSE at 09:14

## 2017-12-08 RX ADMIN — VENLAFAXINE HYDROCHLORIDE 150 MG: 75 CAPSULE, EXTENDED RELEASE ORAL at 09:15

## 2017-12-08 RX ADMIN — CLONAZEPAM 1 MG: 0.5 TABLET ORAL at 00:49

## 2017-12-08 RX ADMIN — CLONAZEPAM 1 MG: 0.5 TABLET ORAL at 21:38

## 2017-12-08 RX ADMIN — QUETIAPINE 150 MG: 150 TABLET, EXTENDED RELEASE ORAL at 21:37

## 2017-12-08 RX ADMIN — ENOXAPARIN SODIUM 40 MG: 40 INJECTION SUBCUTANEOUS at 21:38

## 2017-12-08 RX ADMIN — TAMSULOSIN HYDROCHLORIDE 0.4 MG: 0.4 CAPSULE ORAL at 09:14

## 2017-12-08 RX ADMIN — CLONAZEPAM 1 MG: 0.5 TABLET ORAL at 09:14

## 2017-12-08 RX ADMIN — BUMETANIDE 2 MG: 1 TABLET ORAL at 00:00

## 2017-12-08 RX ADMIN — MORPHINE SULFATE 2 MG: 2 INJECTION, SOLUTION INTRAMUSCULAR; INTRAVENOUS at 21:47

## 2017-12-08 RX ADMIN — INSULIN LISPRO 1 UNITS: 100 INJECTION, SOLUTION INTRAVENOUS; SUBCUTANEOUS at 09:15

## 2017-12-08 RX ADMIN — PREGABALIN 150 MG: 75 CAPSULE ORAL at 09:25

## 2017-12-08 RX ADMIN — TAZOBACTAM SODIUM AND PIPERACILLIN SODIUM 3.38 G: 375; 3 INJECTION, SOLUTION INTRAVENOUS at 05:59

## 2017-12-08 RX ADMIN — POTASSIUM CHLORIDE 40 MEQ: 20 TABLET, EXTENDED RELEASE ORAL at 10:19

## 2017-12-08 RX ADMIN — FERROUS SULFATE TAB EC 325 MG (65 MG FE EQUIVALENT) 325 MG: 325 (65 FE) TABLET DELAYED RESPONSE at 00:01

## 2017-12-08 ASSESSMENT — PAIN SCALES - GENERAL
PAINLEVEL_OUTOF10: 8
PAINLEVEL_OUTOF10: 5
PAINLEVEL_OUTOF10: 3
PAINLEVEL_OUTOF10: 8
PAINLEVEL_OUTOF10: 5
PAINLEVEL_OUTOF10: 7
PAINLEVEL_OUTOF10: 8
PAINLEVEL_OUTOF10: 6
PAINLEVEL_OUTOF10: 8
PAINLEVEL_OUTOF10: 3

## 2017-12-08 ASSESSMENT — PAIN DESCRIPTION - LOCATION: LOCATION: GROIN;FOOT

## 2017-12-08 ASSESSMENT — PAIN DESCRIPTION - PAIN TYPE: TYPE: ACUTE PAIN

## 2017-12-08 NOTE — CONSULTS
[facility]  Urology Consultation    Patient:  Arturo Hollingsworth  MRN: [id number]  YOB: 1965    CHIEF COMPLAINT:  Scrotal swelling, cellulitis    HISTORY OF PRESENT ILLNESS:   The patient is a 47 y.o. male who presents with scrotal swelling, epididymoorchitis, hydrocele. Interstitial fluid collection in the scrotum associated with the patient's significant chronic edema    Patient's old records, notes and chart reviewed and summarized above.     Past Medical History:    Past Medical History:   Diagnosis Date    Anxiety and depression     Back pain, chronic     BPH (benign prostatic hyperplasia)     [facility] [facility]     Diabetes mellitus ([facility] [address]     not checking bloodsugar at home    GERD (gastroesophageal reflux disease)     H/O cardiac catheterization not sure of date    no stents    Hiatal hernia     Hyperlipidemia     not taking any medication    Hypertension     IBS (irritable bowel syndrome)     Neuropathy (HCC)     feet,toes    On home oxygen therapy     prn    Pancreatitis     x 5 episodes    Primary osteoarthritis of right knee     Thyroid disease        Past Surgical History:    Past Surgical History:   Procedure Laterality Date    ABDOMEN SURGERY      hernia repair x4 (ventral)    COLONOSCOPY      2012    ENDOSCOPY, COLON, DIAGNOSTIC      HERNIA REPAIR      NH DEEP DISSEC FOOT INFEC,1 BURSA Bilateral 8/22/2017    FOOT DEBRIDEMENT INCISION AND DRAINAGE with bone bx performed by [place] DPM at [address] EGD TRANSORAL BIOPSY SINGLE/MULTIPLE N/A 7/19/2017    EGD BIOPSY performed by Mingo Zuñiga MD at [facility]  07/19/2017    polypectomy     Previous  surgery: none     Medications:    Scheduled Meds:   oxybutynin  5 mg Oral BID    aspirin  81 mg Oral Daily    bumetanide  2 mg Oral BID    DULoxetine  60 mg Oral Daily    pantoprazole  40 mg Oral QAM AC    ferrous sulfate  325 mg Oral BID    insulin glargine  30 Units 12/07/17   [address]   [address] [place] [address]  6.0   [facility] 68 TO 10   MUCUS  NOT REPORTED   TRICHOMONAS  NOT REPORTED   YEAST  NOT REPORTED   BACTERIA  MODERATE*   SPECGRAV  1.010   LEUKOCYTESUR  LARGE*   UROBILINOGEN  Normal   BILIRUBINUR  NEGATIVE        -----------------------------------------------------------------  Imaging Results: Scrotal ultrasound  Asymmetrically increased flow to the right testis and epididymis, consistent   with epididymo-orchitis as well as moderate-sized complex right hydrocele.       There is a small benign-appearing left epididymal cyst.         CT abdomen and pelvis, no evidence of hydronephrosis or renal mass large ventral hernias as noted previously      Assessment and Plan   Impression:  Right epididymal orchitis significant scrotal edema no scrotal abscess  Patient Active Problem List   Diagnosis    Alcoholic cirrhosis of liver (HCC)    Peripheral edema    Bipolar disorder (HCC)    Diabetes mellitus with neuropathy ([facility]    Essential hypertension    Hyperlipidemia    Weakness    Hyponatremia    FABI (obstructive sleep apnea)    Primary osteoarthritis of right knee    DM (diabetes mellitus), type 2 with neurological complications (HCC)    Hypothyroidism    Urine retention    Anemia    Cellulitis of right lower extremity    Gastroesophageal reflux disease    Slow transit constipation    Constipation    Iron deficiency anemia due to chronic blood loss    Gastroesophageal reflux disease without esophagitis    Secondary esophageal varices without bleeding (HCC)    Portal hypertension (HCC)    Morbid obesity with BMI of 50.0-59.9, adult (HCC)    Venous stasis dermatitis of both lower extremities    Cellulitis of left lower extremity    Cellulitis of perineum       Plan: Discussed with infectious disease   . Agree with Levaquin.     Catheter removal trial of voiding when the patient is more ambulatory    Monik Shin  1:51

## 2017-12-08 NOTE — PROGRESS NOTES
Patient admitted to floor. Medications reviewed with patient and database complete. Patient resting comfortably.   Sabine Velasquez

## 2017-12-08 NOTE — H&P
[facility]    HISTORY AND PHYSICAL EXAMINATION            Date:   12/8/2017  Patient name:  Samy Alejandre  Date of admission:  12/7/2017  4:54 PM  MRN:   [id number]  Account:  [de-identified]  YOB: 1965  PCP:    Janki Rodriguez MD  Room:   [address] [place] [address]  Code Status:    Full Code    Chief Complaint:     Chief Complaint   Patient presents with    Chest Pain    Groin Swelling    Leg Swelling    Shortness of Breath    Urinary Retention       History Obtained From:     patient    History of Present Illness: The patient is a 47 y.o. Non-/non  male who presents with Chest Pain; Groin Swelling; Leg Swelling; Shortness of Breath; and Urinary Retention   and he is admitted to the hospital for the management of  Swelling in groin, along with inability to urinate. The swelling has been there for 2 days. He stated he did have uti about 2 months ago, took 10 days bactrim and it resolved. Couple days ago developed urinary retention and burning. Has had chills and sweats also. Sees wound care center for ble wounds every 3 weeks and his home rn changes dressings q 3 days.         Past Medical History:     Past Medical History:   Diagnosis Date    Anxiety and depression     Back pain, chronic     BPH (benign prostatic hyperplasia)     Cirrhosis ([address]     Diabetes mellitus ([facility]     not checking bloodsugar at home    GERD (gastroesophageal reflux disease)     H/O cardiac catheterization not sure of date    no stents    Hiatal hernia     Hyperlipidemia     not taking any medication    Hypertension     IBS (irritable bowel syndrome)     Neuropathy (HCC)     feet,toes    On home oxygen therapy     prn    Pancreatitis     x 5 episodes    Primary osteoarthritis of right knee     Thyroid disease         Past Surgical History:     Past Surgical History:   Procedure Laterality Date    ABDOMEN SURGERY      hernia repair x4 negative for frequency   INTEGUMENT:  Has sheldon le wounds   HEMATOLOGIC/LYMPHATIC:  positive for swelling/edema of ble and scrotum  ALLERGIC/IMMUNOLOGIC:  negative for urticaria , itching  ENDOCRINE:  negative increase in drinking, increase in urination, hot or cold intolerance  MUSCULOSKELETAL:  negative joint pains, muscle aches, swelling of joints  NEUROLOGICAL:  negative for headaches, dizziness, lightheadedness,  tingling extremities  BEHAVIOR/PSYCH:  positive for depression, anxiety    Physical Exam:   /64   Pulse 97   Temp 97.9 °F (36.6 °C) (Oral)   Resp 18   Ht 5' 11\" (1.803 m)   Wt (!) 398 lb 5.9 oz (180.7 kg) Comment: Simultaneous filing. User may not have seen previous data. SpO2 90%   BMI 55.56 kg/m²   Temp (24hrs), Av °F (36.7 °C), Min:97.5 °F (36.4 °C), Max:98.9 °F (37.2 °C)    Recent Labs      17   2347  17   0803   POCGLU  155*  144*       Intake/Output Summary (Last 24 hours) at 17 0943  Last data filed at 17 0341   Gross per 24 hour   Intake              740 ml   Output             4675 ml   Net            -3935 ml       General Appearance:  alert, well appearing, and in no acute distress  Mental status: oriented to person, place, and time with normal affect  Head:  normocephalic, atraumatic. Eye: no icterus, redness, pupils equal and reactive, extraocular eye movements intact, conjunctiva clear  Ear: normal external ear, no discharge, hearing intact  Nose:  no drainage noted  Mouth: mucous membranes moist  Neck: supple, no carotid bruits, thyroid not palpable  Lungs: Bilateral equal air entry, clear to ausculation, no wheezing, rales or rhonchi, normal effort  Cardiovascular: normal rate, regular rhythm, no murmur, gallop, rub.   Abdomen: Soft, nontender, nondistended, normal bowel sounds, no hepatomegaly or splenomegaly; obese  Neurologic: There are no new focal motor or sensory deficits, normal muscle tone and bulk, no abnormal sensation, normal speech, cranial nerves II through XII grossly intact  Skin: scrotum with some swelling, minimal redness of perineum  Extremities:  peripheral pulses palpable, no calf pain with palpation; chronically edematous ble-bandaged  Psych: normal affect     Osteopathic Examination: Unable to perform due to patients current medical condition    Investigations:      Laboratory Testing:  Recent Results (from the past 24 hour(s))   EKG 12 Lead    Collection Time: 12/07/17  4:56 PM   Result Value Ref Range    Ventricular Rate 90 BPM    Atrial Rate 90 BPM    P-R Interval 148 ms    QRS Duration 98 ms    Q-T Interval 372 ms    QTc Calculation (Bazett) 455 ms    P Axis 55 degrees    R Axis -16 degrees    T Axis 34 degrees   CBC with DIFF    Collection Time: 12/07/17  5:25 PM   Result Value Ref Range    WBC 13.1 (H) 3.5 - 11.0 k/uL    RBC 3.87 (L) 4.5 - 5.9 m/uL    Hemoglobin 11.3 (L) 13.5 - 17.5 g/dL    Hematocrit 35.4 (L) 41 - 53 %    MCV 91.4 80 - 100 fL    MCH 29.2 26 - 34 pg    MCHC 32.0 31 - 37 g/dL    RDW 15.6 (H) 11.5 - 14.5 %    Platelets 927 869 - 111 k/uL    MPV 7.8 6.0 - 12.0 fL    Differential Type NOT REPORTED     Immature Granulocytes NOT REPORTED 0 %    Absolute Immature Granulocyte NOT REPORTED 0.00 - 0.30 k/uL    WBC Morphology NOT REPORTED     RBC Morphology NOT REPORTED     Platelet Estimate NOT REPORTED     Seg Neutrophils 85 (H) 36 - 66 %    Lymphocytes 7 (L) 24 - 44 %    Monocytes 6 1 - 7 %    Eosinophils % 1 1 - 4 %    Basophils 1 0 - 2 %    Segs Absolute 11.10 (H) 1.8 - 7.7 k/uL    Absolute Lymph # 0.90 (L) 1.0 - 4.8 k/uL    Absolute Mono # 0.80 0.2 - 0.8 k/uL    Absolute Eos # 0.10 0.0 - 0.4 k/uL    Basophils # 0.10 0.0 - 0.2 k/uL   Basic Metabolic Prof    Collection Time: 12/07/17  5:25 PM   Result Value Ref Range    Glucose 158 (H) 70 - 99 mg/dL    BUN 12 6 - 20 mg/dL    CREATININE 1.11 0.70 - 1.20 mg/dL    Bun/Cre Ratio 11 9 - 20    Calcium 8.9 8.6 - 10.4 mg/dL    Sodium 131 (L) 135 - 144 mmol/L    Potassium 3.9 Cult,Urine,Cath    Collection Time: 12/07/17  6:15 PM   Result Value Ref Range    Specimen Description       . INDWELLING CATH URINE Performed at [address]    Specimen Description  [address] [phone]     Special Requests NOT REPORTED     Culture CULTURE IN PROGRESS     Culture       Performed at [address] [place] [address] [phone]    Status Pending    Microscopic Urinalysis    Collection Time: 12/07/17  6:15 PM   Result Value Ref Range    -          WBC, UA 20 TO 50 0 - 5 /HPF    RBC, UA 5 TO 10 0 - 2 /HPF    Casts UA NOT REPORTED /LPF    Crystals UA NOT REPORTED NONE /HPF    Epithelial Cells UA 2 TO 5 0 - 5 /HPF    Renal Epithelial, Urine NOT REPORTED 0 /HPF    Bacteria, UA MODERATE (A) NONE    Mucus, UA NOT REPORTED NONE    Trichomonas, UA NOT REPORTED NONE    Amorphous, UA NOT REPORTED NONE    Other Observations UA NOT REPORTED NREQ    Yeast, UA NOT REPORTED NONE   Cult,Blood #2    Collection Time: 12/07/17  6:30 PM   Result Value Ref Range    Specimen Description       . BLOOD LT FOREARM 1847 KB [facility] [address]    Specimen Description  [address] [phone]     Special Requests NOT REPORTED     Culture NO GROWTH 7 HOURS     Culture       Performed at [address] [place] [address] [phone]    Status Pending    Cult,Blood #1    Collection Time: 12/07/17  6:47 PM   Result Value Ref Range    Specimen Description       . BLOOD RT HAND 6ML RS Performed at [address]    Specimen Description  [address] [phone]     Special Requests NOT REPORTED     Culture NO GROWTH 7 HOURS     Culture       Performed at [address] [place] [address] [phone]    Status Pending    Lactic Acid    Collection Time: 12/07/17  6:47 PM   Result Value Ref Range    Lactic Acid 1.9 0.5 - 2.2 mmol/L Trop/Myoglobin    Collection Time: 12/07/17  8:23 PM   Result Value Ref Range    Troponin T <0.03 <0.03 ng/mL    Troponin Interp          Myoglobin 44 28 - 72 ng/mL   Lactic Acid    Collection Time: 12/07/17  9:29 PM   Result Value Ref Range    Lactic Acid 0.8 0.5 - 2.2 mmol/L   POC Glucose Fingerstick    Collection Time: 12/07/17 11:47 PM   Result Value Ref Range    POC Glucose 155 (H) 75 - 110 mg/dL   Comprehensive metabolic panel    Collection Time: 12/08/17  5:50 AM   Result Value Ref Range    Glucose 137 (H) 70 - 99 mg/dL    BUN 9 6 - 20 mg/dL    CREATININE 1.02 0.70 - 1.20 mg/dL    Bun/Cre Ratio 9 9 - 20    Calcium 8.8 8.6 - 10.4 mg/dL    Sodium 135 135 - 144 mmol/L    Potassium 3.3 (L) 3.7 - 5.3 mmol/L    Chloride 91 (L) 98 - 107 mmol/L    CO2 34 (H) 20 - 31 mmol/L    Anion Gap 10 9 - 17 mmol/L    Alkaline Phosphatase 129 40 - 129 U/L    ALT 17 5 - 41 U/L    AST 14 <40 U/L    Total Bilirubin 0.55 0.3 - 1.2 mg/dL    Total Protein 6.6 6.4 - 8.3 g/dL    Alb 3.2 (L) 3.5 - 5.2 g/dL    Albumin/Globulin Ratio NOT REPORTED 1.0 - 2.5    GFR Non-African American >60 >60 mL/min    GFR African American >60 >60 mL/min    GFR Comment          GFR Staging NOT REPORTED    CBC auto differential    Collection Time: 12/08/17  5:50 AM   Result Value Ref Range    WBC 8.9 3.5 - 11.0 k/uL    RBC 3.83 (L) 4.5 - 5.9 m/uL    Hemoglobin 11.1 (L) 13.5 - 17.5 g/dL    Hematocrit 35.2 (L) 41 - 53 %    MCV 91.9 80 - 100 fL    MCH 29.1 26 - 34 pg    MCHC 31.6 31 - 37 g/dL    RDW 15.6 (H) 11.5 - 14.5 %    Platelets 882 861 - 988 k/uL    MPV 7.6 6.0 - 12.0 fL    Differential Type NOT REPORTED     Seg Neutrophils 80 (H) 36 - 66 %    Lymphocytes 9 (L) 24 - 44 %    Monocytes 8 (H) 1 - 7 %    Eosinophils % 2 1 - 4 %    Basophils 1 0 - 2 %    Immature Granulocytes NOT REPORTED 0 %    Segs Absolute 7.20 1.8 - 7.7 k/uL    Absolute Lymph # 0.80 (L) 1.0 - 4.8 k/uL    Absolute Mono # 0.70 0.2 - 0.8 k/uL    Absolute Eos # 0.10 0.0 - 0.4 k/uL Basophils # 0.10 0.0 - 0.2 k/uL    Absolute Immature Granulocyte NOT REPORTED 0.00 - 0.30 k/uL    WBC Morphology NOT REPORTED     RBC Morphology NOT REPORTED     Platelet Estimate NOT REPORTED    POC Glucose Fingerstick    Collection Time: 12/08/17  8:03 AM   Result Value Ref Range    POC Glucose 144 (H) 75 - 110 mg/dL       Imaging/Diagnostics:    Ct abd:     Impression   1. Nonspecific, mild inflammatory change in the pelvis in the area of the   posterior cul-de-sac is identified.  There are no abnormal fluid or gas   collections.  No diverticular disease is appreciated. 2. Similar appearance of the abdomen and pelvis compared to the 2014   evaluation in that there are 2 ventral fat containing hernias. Cxr:     Impression   Hypoinflated lungs.  No focal consolidation. Assessment :      Primary Problem  Cellulitis of perineum    Active Hospital Problems    Diagnosis Date Noted    Peripheral edema [R60.9] 06/18/2014     Priority: High    Bipolar disorder ([facility] [F31.9] 06/18/2014     Priority: Medium    Diabetes mellitus with neuropathy ([facility] [address] [facility] [E11.40] 06/18/2014     Priority: Medium    Essential hypertension [I10] 06/18/2014     Priority: Medium    Hyperlipidemia [E78.5] 06/18/2014     Priority: Medium    Weakness [R53.1] 06/18/2014     Priority: Medium    Morbid obesity with BMI of 50.0-59.9, adult ([facility] [address] [facility] [E66.01, Z68.43] 12/08/2017    Urine retention [R33.9] 12/08/2017    Cellulitis of perineum [K54.203] 12/07/2017    Venous stasis dermatitis of both lower extremities [I87.2] 08/19/2017    Hypothyroidism [E03.9]        Plan:     Patient status Admit as inpatient in the  Med/Surge    1. uro eval  2. Iv abx  3. ID eval  4. Monitor bp/bs  5. Dc ivf  6. Pt/ot  7. Dc planning 24-48h  8.  Refuses snf, as he has mult times in past    Consultations:   IP CONSULT TO HOSPITALIST  IP CONSULT TO UROLOGY  IP CONSULT TO INFECTIOUS DISEASES  IP CONSULT TO INFECTIOUS DISEASES  IP CONSULT TO UROLOGY Patient is admitted as inpatient status because of co-morbidities listed above, severity of signs and symptoms as outlined, requirement for current medical therapies and most importantly because of direct risk to patient if care not provided in a hospital setting.     Jose Anderson, DO  12/8/2017  9:43 AM    Copy sent to Dr. Jessica Johnson MD

## 2017-12-08 NOTE — PROGRESS NOTES
Patient admitted to floor. Vitals completed, database complete, and assessment done. Patient comfortably resting.   Bina Chacon

## 2017-12-08 NOTE — PROGRESS NOTES
Physical Therapy    Facility/Department: [facility] PROGRESSIVE CARE  Initial Assessment    NAME: Theresa Ramos  : 1965  MRN: [id number]    Date of Service: 2017    Patient Diagnosis(es): The primary encounter diagnosis was Cellulitis of perineum. Diagnoses of Urinary tract infection with hematuria, site unspecified and Abdominal pain, unspecified abdominal location were also pertinent to this visit. has a past medical history of Anxiety and depression; Back pain, chronic; BPH (benign prostatic hyperplasia); Cirrhosis ([facility] Diabetes mellitus ([facility] [address] [facility] GERD (gastroesophageal reflux disease); H/O cardiac catheterization; Hiatal hernia; Hyperlipidemia; Hypertension; IBS (irritable bowel syndrome); Neuropathy ([facility] [address] [facility] On home oxygen therapy; Pancreatitis; Primary osteoarthritis of right knee; and Thyroid disease. has a past surgical history that includes Colonoscopy; Abdomen surgery; hernia repair; Endoscopy, colon, diagnostic; Upper gastrointestinal endoscopy (2017); egd transoral biopsy single/multiple (N/A, 2017); and deep dissec foot infec,1 bursa (Bilateral, 2017). Restrictions  Restrictions/Precautions  Restrictions/Precautions: General Precautions, Fall Risk  Required Braces or Orthoses?: No  Vision/Hearing  Vision: Impaired  Vision Exceptions: Wears glasses at all times  Hearing: Within functional limits     Subjective  General  Chart Reviewed: Yes  Patient assessed for rehabilitation services?: Yes  Response To Previous Treatment: Not applicable  Family / Caregiver Present: Yes (Son)  Follows Commands: Within Functional Limits  Pain Screening  Patient Currently in Pain: Yes  Pain Assessment  Pain Level: 8  Pain Location: Groin; Foot (R foot.groin pain primary problem per pt,)  Vital Signs  Patient Currently in Pain: Yes       Orientation  Orientation  Overall Orientation Status: Within Normal Limits    Social/Functional History  Social/Functional History  Lives With: Son  Type of Home: Apartment  Home Layout: One level (2nd floor)  Home Access: Stairs to enter with rails  Entrance Stairs - Number of Steps: 7  Entrance Stairs - Rails: Both  Bathroom Shower/Tub: Tub/Shower unit  Bathroom Toilet: Standard  Bathroom Accessibility: Accessible  Home Equipment: Rolling walker, Vux  Receives Help From: Family, Home health  ADL Assistance: Needs assistance  Homemaking Assistance: Needs assistance  Ambulation Assistance: Independent (Short distances in home w/ DI(96' x 2) to bathroom and back)  Active : No  Occupation: On disability  Objective     Observation/Palpation  Posture: Good    AROM RLE (degrees)  RLE AROM: WFL  RLE General AROM: B LE's limited by tissue approximation  AROM LLE (degrees)  LLE AROM : WFL  AROM RUE (degrees)  RUE AROM : WFL  AROM LUE (degrees)  LUE AROM : WFL  Strength RLE  Strength RLE: WFL  Comment: B ankles 4/5,hips/knees 3+/5  Strength LLE  Strength LLE: WFL  Strength RUE  Strength RUE: WFL  Comment: B /wrist 4+/5,elbows/shldrs 4/5  Strength LUE  Strength LUE: WFL  Tone RLE  RLE Tone: Normotonic  Tone LLE  LLE Tone: Normotonic  Motor Control  Gross Motor?: WNL  Sensation  Overall Sensation Status: Impaired (Paresthesia B feet)  Bed mobility  Rolling to Left: Modified independent  Rolling to Right: Modified independent  Supine to Sit: Moderate assistance  Sit to Supine: Maximum assistance  Scooting: Maximal assistance  Transfers  Sit to Stand: Contact guard assistance  Stand to sit: Contact guard assistance  Stand Pivot Transfers: Contact guard assistance  Ambulation  Ambulation?: Yes  Ambulation 1  Surface: level tile  Device: Rolling Walker  Assistance: Contact guard assistance  Distance: 2 steps forward then back  Stairs/Curb  Stairs?: No     Balance  Posture: Good  Sitting - Static: Good  Sitting - Dynamic: Good  Standing - Static: Good (w/ RW)  Standing - Dynamic: Good;- (w/ RW)        Assessment   Body structures, Functions, Activity limitations:

## 2017-12-08 NOTE — CONSULTS
and accommodation; extraocular movements intact; sclera anicteric; conjunctivae pink  ENT: Oropharynx clear, without erythema, exudate, or thrush. Neck: Supple, without lymphadenopathy. Pulmonary/Chest: Clear to auscultation, without wheezes, rales, or rhonchi  Cardiovascular: Regular rate and rhythm without murmurs, rubs, or gallops. Abdomen: soft, non-tender and Positive bowel sounds with an obese abdomen and no organomegaly appreciated. There is significant scrotal edema and there is a Clemons catheter in place. Extremities: venous stasis dermatosis noted and no edema appreciated at this time. There is a left foot great toe ulceration and a dry, calcaneal lesion. The right foot calcaneal and great toe area of dry exfoliating skin. Right lower extremity    Left lower extremity  Neurologic: No gross sensory or motor deficits. Skin: Warm and dry with no rash. Medical Decision Making:   I have independently reviewed/ordered the following labs:  CBC with Differential:   Recent Labs      12/07/17   1725  12/08/17   0550   WBC  13.1*  8.9   HGB  11.3*  11.1*   HCT  35.4*  35.2*   PLT  152  142   LYMPHOPCT  7*  9*   MONOPCT  6  8*     BMP:   Recent Labs      12/07/17   1725  12/08/17   0550   NA  131*  135   K  3.9  3.3*   CL  90*  91*   CO2  27  34*   BUN  12  9   CREATININE  1.11  1.02     Hepatic Function Panel:   Recent Labs      12/07/17   1725  12/08/17   0550   PROT  7.3  6.6   LABALBU  3.3*  3.2*   BILIDIR  0.31*   --    IBILI  0.34   --    BILITOT  0.65  0.55   ALKPHOS  134*  129   ALT  19  17   AST  15  14     Lab Results   Component Value Date    CRP 74.3 (H) 08/20/2017     Lab Results   Component Value Date    SEDRATE 83 (H) 08/20/2017       Imaging Studies:   CT OF THE ABDOMEN AND PELVIS WITH CONTRAST 12/7/2017 6:37 pm  Impression   1. Nonspecific, mild inflammatory change in the pelvis in the area of the   posterior cul-de-sac is identified. Samantha Shelby are no abnormal fluid or gas   collections.

## 2017-12-08 NOTE — CARE COORDINATION
Plan:   Patient lives with son in apartment. He is current with [facility] for skilled RN 3 x week and [facility] thru [facility] who come in daily from 3-7 pm. Called both agencies to confirm and notify of the admission     Patient refuses all snfs. He is going to have ID consult and will watch for cellulitis treatment. KAROLYN entered and will follow.          Electronically signed by Thad Carrera RN on 12/8/17 at 3:10 PM

## 2017-12-08 NOTE — PLAN OF CARE
[facility]    Second Visit Note  For more detailed information please refer to the progress note of the day      12/8/2017    5:17 PM    Name:   Morgan Zaragoza  MRN:     [id number]     Melitonide:      [de-identified]   Room:   [facility]26-01   Day:  1  Admit Date:  12/7/2017  4:54 PM    PCP:   Rosio Yanes MD  Code Status:  Full Code        Pt vitals were reviewed   New labs were reviewed   Patient was seen    Updated plan :     1. Iv abx stopped by ID and switched to po levaquin  2.  Await scrotal us        Jodi [facility] Blood, DO  12/8/2017  5:17 PM

## 2017-12-08 NOTE — PLAN OF CARE
Problem: Pain:  Goal: Pain level will decrease  Pain level will decrease   Outcome: Ongoing  Patient states understanding of pain scale and interventions. Pain assessed with hourly rounding and PRN. Patient states pain level is 7 /10. Will continue to monitor.

## 2017-12-08 NOTE — PROGRESS NOTES
Pharmacy Accuracy Service Medication History Note    The patient's list of current home medications has been reviewed. The patient's allergy list has been reviewed and updated. Source(s) of information: [place], patient    Based on information provided by the above source(s), I have updated the patient's home med list as described below. Please review the ACTION REQUESTED BY PHYSICIAN section of this note below for any discrepancies on current hospital orders. I changed or updated the following medications on the patient's home medication list:  Discontinued · Duplicates-spironolactone, ferrous sulfate     Added · Percocet  QID prn     Adjusted   · Spironolactone adjusted to 75mg BID (pt has both 25mg and 50mg tabs)  · Bumex adjusted to 2mg tablet       PHYSICIAN ACTION REQUESTED  Discrepancies on current hospital orders that need to be addressed by a physician:    Medication Action Requested   percocet     Please reconcile          Please feel free to call me with any questions about this encounter. Thank you.     Nadia Hardy PharmD  Pharmacy Medication Accuracy Review Service  Phone:  [phone]  Fax: [phone]      Electronically signed by Nadia Hardy, [address] [place] [address] on 12/8/2017 at 4:37 PM

## 2017-12-09 ENCOUNTER — APPOINTMENT (OUTPATIENT)
Dept: ULTRASOUND IMAGING | Age: 52
DRG: 603 | End: 2017-12-09
Payer: MEDICARE

## 2017-12-09 PROBLEM — N50.89 SCROTAL EDEMA: Status: ACTIVE | Noted: 2017-12-09

## 2017-12-09 LAB
CULTURE: ABNORMAL
CULTURE: ABNORMAL
GLUCOSE BLD-MCNC: 137 MG/DL (ref 75–110)
GLUCOSE BLD-MCNC: 169 MG/DL (ref 75–110)
GLUCOSE BLD-MCNC: 195 MG/DL (ref 75–110)
Lab: ABNORMAL
ORGANISM: ABNORMAL
SPECIMEN DESCRIPTION: ABNORMAL
SPECIMEN DESCRIPTION: ABNORMAL
STATUS: ABNORMAL

## 2017-12-09 PROCEDURE — 6370000000 HC RX 637 (ALT 250 FOR IP): Performed by: NURSE PRACTITIONER

## 2017-12-09 PROCEDURE — 99232 SBSQ HOSP IP/OBS MODERATE 35: CPT | Performed by: INTERNAL MEDICINE

## 2017-12-09 PROCEDURE — 82947 ASSAY GLUCOSE BLOOD QUANT: CPT

## 2017-12-09 PROCEDURE — 2500000003 HC RX 250 WO HCPCS: Performed by: NURSE PRACTITIONER

## 2017-12-09 PROCEDURE — 97530 THERAPEUTIC ACTIVITIES: CPT

## 2017-12-09 PROCEDURE — 2580000003 HC RX 258: Performed by: NURSE PRACTITIONER

## 2017-12-09 PROCEDURE — G8988 SELF CARE GOAL STATUS: HCPCS

## 2017-12-09 PROCEDURE — 97166 OT EVAL MOD COMPLEX 45 MIN: CPT

## 2017-12-09 PROCEDURE — 6370000000 HC RX 637 (ALT 250 FOR IP): Performed by: INTERNAL MEDICINE

## 2017-12-09 PROCEDURE — 97110 THERAPEUTIC EXERCISES: CPT

## 2017-12-09 PROCEDURE — 6360000002 HC RX W HCPCS: Performed by: NURSE PRACTITIONER

## 2017-12-09 PROCEDURE — G8987 SELF CARE CURRENT STATUS: HCPCS

## 2017-12-09 PROCEDURE — 76870 US EXAM SCROTUM: CPT

## 2017-12-09 PROCEDURE — 2060000000 HC ICU INTERMEDIATE R&B

## 2017-12-09 PROCEDURE — 97535 SELF CARE MNGMENT TRAINING: CPT

## 2017-12-09 RX ORDER — HYDROXYZINE HYDROCHLORIDE 25 MG/1
25 TABLET, FILM COATED ORAL 3 TIMES DAILY PRN
Status: DISCONTINUED | OUTPATIENT
Start: 2017-12-09 | End: 2017-12-12 | Stop reason: HOSPADM

## 2017-12-09 RX ADMIN — RIFAXIMIN 550 MG: 550 TABLET ORAL at 09:06

## 2017-12-09 RX ADMIN — Medication 10 ML: at 09:06

## 2017-12-09 RX ADMIN — ENOXAPARIN SODIUM 40 MG: 40 INJECTION SUBCUTANEOUS at 21:58

## 2017-12-09 RX ADMIN — INSULIN LISPRO 1 UNITS: 100 INJECTION, SOLUTION INTRAVENOUS; SUBCUTANEOUS at 09:06

## 2017-12-09 RX ADMIN — VENLAFAXINE HYDROCHLORIDE 150 MG: 75 CAPSULE, EXTENDED RELEASE ORAL at 09:06

## 2017-12-09 RX ADMIN — INSULIN GLARGINE 30 UNITS: 100 INJECTION, SOLUTION SUBCUTANEOUS at 09:07

## 2017-12-09 RX ADMIN — CLONAZEPAM 1 MG: 0.5 TABLET ORAL at 11:38

## 2017-12-09 RX ADMIN — PANTOPRAZOLE SODIUM 40 MG: 40 TABLET, DELAYED RELEASE ORAL at 07:19

## 2017-12-09 RX ADMIN — OXYBUTYNIN CHLORIDE 5 MG: 5 TABLET ORAL at 09:06

## 2017-12-09 RX ADMIN — MORPHINE SULFATE 4 MG: 4 INJECTION, SOLUTION INTRAMUSCULAR; INTRAVENOUS at 01:56

## 2017-12-09 RX ADMIN — HYDROCODONE BITARTRATE AND ACETAMINOPHEN 2 TABLET: 5; 325 TABLET ORAL at 07:19

## 2017-12-09 RX ADMIN — HYDROCODONE BITARTRATE AND ACETAMINOPHEN 2 TABLET: 5; 325 TABLET ORAL at 11:38

## 2017-12-09 RX ADMIN — MORPHINE SULFATE 4 MG: 4 INJECTION, SOLUTION INTRAMUSCULAR; INTRAVENOUS at 09:06

## 2017-12-09 RX ADMIN — HYDROCODONE BITARTRATE AND ACETAMINOPHEN 2 TABLET: 5; 325 TABLET ORAL at 20:31

## 2017-12-09 RX ADMIN — RIFAXIMIN 550 MG: 550 TABLET ORAL at 21:57

## 2017-12-09 RX ADMIN — Medication 10 ML: at 21:59

## 2017-12-09 RX ADMIN — FERROUS SULFATE TAB EC 325 MG (65 MG FE EQUIVALENT) 325 MG: 325 (65 FE) TABLET DELAYED RESPONSE at 21:57

## 2017-12-09 RX ADMIN — LEVOTHYROXINE SODIUM 150 MCG: 150 TABLET ORAL at 07:19

## 2017-12-09 RX ADMIN — QUETIAPINE 150 MG: 150 TABLET, EXTENDED RELEASE ORAL at 21:58

## 2017-12-09 RX ADMIN — HYDROCODONE BITARTRATE AND ACETAMINOPHEN 2 TABLET: 5; 325 TABLET ORAL at 16:17

## 2017-12-09 RX ADMIN — MORPHINE SULFATE 4 MG: 4 INJECTION, SOLUTION INTRAMUSCULAR; INTRAVENOUS at 04:19

## 2017-12-09 RX ADMIN — INSULIN LISPRO 1 UNITS: 100 INJECTION, SOLUTION INTRAVENOUS; SUBCUTANEOUS at 21:59

## 2017-12-09 RX ADMIN — PREGABALIN 150 MG: 75 CAPSULE ORAL at 21:57

## 2017-12-09 RX ADMIN — CLONAZEPAM 1 MG: 0.5 TABLET ORAL at 21:58

## 2017-12-09 RX ADMIN — MORPHINE SULFATE 4 MG: 4 INJECTION, SOLUTION INTRAMUSCULAR; INTRAVENOUS at 18:19

## 2017-12-09 RX ADMIN — MICONAZOLE NITRATE: 20.6 POWDER TOPICAL at 22:04

## 2017-12-09 RX ADMIN — OXYBUTYNIN CHLORIDE 5 MG: 5 TABLET ORAL at 21:58

## 2017-12-09 RX ADMIN — PREGABALIN 150 MG: 75 CAPSULE ORAL at 09:43

## 2017-12-09 RX ADMIN — NADOLOL 40 MG: 20 TABLET ORAL at 09:06

## 2017-12-09 RX ADMIN — INSULIN LISPRO 1 UNITS: 100 INJECTION, SOLUTION INTRAVENOUS; SUBCUTANEOUS at 17:12

## 2017-12-09 RX ADMIN — SPIRONOLACTONE 75 MG: 25 TABLET ORAL at 17:12

## 2017-12-09 RX ADMIN — TAMSULOSIN HYDROCHLORIDE 0.4 MG: 0.4 CAPSULE ORAL at 09:06

## 2017-12-09 RX ADMIN — ENOXAPARIN SODIUM 40 MG: 40 INJECTION SUBCUTANEOUS at 09:07

## 2017-12-09 RX ADMIN — FERROUS SULFATE TAB EC 325 MG (65 MG FE EQUIVALENT) 325 MG: 325 (65 FE) TABLET DELAYED RESPONSE at 09:07

## 2017-12-09 RX ADMIN — MICONAZOLE NITRATE: 20.6 POWDER TOPICAL at 09:07

## 2017-12-09 RX ADMIN — LEVOFLOXACIN 750 MG: 750 TABLET, FILM COATED ORAL at 09:07

## 2017-12-09 RX ADMIN — HYDROXYZINE HYDROCHLORIDE 25 MG: 25 TABLET ORAL at 17:12

## 2017-12-09 RX ADMIN — HYDROXYZINE HYDROCHLORIDE 25 MG: 25 TABLET ORAL at 09:41

## 2017-12-09 RX ADMIN — MORPHINE SULFATE 4 MG: 4 INJECTION, SOLUTION INTRAMUSCULAR; INTRAVENOUS at 22:09

## 2017-12-09 RX ADMIN — LINAGLIPTIN 5 MG: 5 TABLET, FILM COATED ORAL at 09:07

## 2017-12-09 RX ADMIN — HYDROXYZINE HYDROCHLORIDE 25 MG: 25 TABLET ORAL at 23:53

## 2017-12-09 RX ADMIN — MORPHINE SULFATE 4 MG: 4 INJECTION, SOLUTION INTRAMUSCULAR; INTRAVENOUS at 13:52

## 2017-12-09 RX ADMIN — ASPIRIN 81 MG: 81 TABLET, COATED ORAL at 09:07

## 2017-12-09 RX ADMIN — HYDROCODONE BITARTRATE AND ACETAMINOPHEN 2 TABLET: 5; 325 TABLET ORAL at 03:16

## 2017-12-09 RX ADMIN — BUMETANIDE 2 MG: 1 TABLET ORAL at 21:57

## 2017-12-09 RX ADMIN — SPIRONOLACTONE 75 MG: 25 TABLET ORAL at 09:06

## 2017-12-09 RX ADMIN — BUMETANIDE 2 MG: 1 TABLET ORAL at 09:07

## 2017-12-09 RX ADMIN — DULOXETINE HYDROCHLORIDE 60 MG: 60 CAPSULE, DELAYED RELEASE ORAL at 09:07

## 2017-12-09 ASSESSMENT — PAIN DESCRIPTION - ONSET
ONSET: GRADUAL
ONSET: GRADUAL
ONSET: ON-GOING

## 2017-12-09 ASSESSMENT — PAIN DESCRIPTION - FREQUENCY
FREQUENCY: CONTINUOUS

## 2017-12-09 ASSESSMENT — PAIN DESCRIPTION - LOCATION
LOCATION: OTHER (COMMENT)
LOCATION: GROIN;OTHER (COMMENT)
LOCATION: SCROTUM
LOCATION: GROIN;SCROTUM
LOCATION: OTHER (COMMENT)
LOCATION: SCROTUM;LEG
LOCATION: GROIN
LOCATION: SCROTUM

## 2017-12-09 ASSESSMENT — PAIN DESCRIPTION - PAIN TYPE
TYPE: ACUTE PAIN

## 2017-12-09 ASSESSMENT — PAIN DESCRIPTION - PROGRESSION
CLINICAL_PROGRESSION: GRADUALLY WORSENING

## 2017-12-09 ASSESSMENT — PAIN SCALES - GENERAL
PAINLEVEL_OUTOF10: 8
PAINLEVEL_OUTOF10: 6
PAINLEVEL_OUTOF10: 8
PAINLEVEL_OUTOF10: 7
PAINLEVEL_OUTOF10: 5
PAINLEVEL_OUTOF10: 8
PAINLEVEL_OUTOF10: 9
PAINLEVEL_OUTOF10: 8
PAINLEVEL_OUTOF10: 8
PAINLEVEL_OUTOF10: 6
PAINLEVEL_OUTOF10: 6
PAINLEVEL_OUTOF10: 8
PAINLEVEL_OUTOF10: 8
PAINLEVEL_OUTOF10: 6
PAINLEVEL_OUTOF10: 8
PAINLEVEL_OUTOF10: 8

## 2017-12-09 ASSESSMENT — PAIN DESCRIPTION - DESCRIPTORS
DESCRIPTORS: ACHING;BURNING
DESCRIPTORS: CONSTANT
DESCRIPTORS: BURNING;ACHING
DESCRIPTORS: BURNING
DESCRIPTORS: ACHING;BURNING

## 2017-12-09 NOTE — PROGRESS NOTES
Occupational Therapy   Occupational Therapy Initial Assessment  Date: 2017   Patient Name: Ariadne Hussein  MRN: [id number]     : 1965    Patient Diagnosis(es): The primary encounter diagnosis was Cellulitis of perineum. Diagnoses of Urinary tract infection with hematuria, site unspecified and Abdominal pain, unspecified abdominal location were also pertinent to this visit. has a past medical history of Anxiety and depression; Back pain, chronic; BPH (benign prostatic hyperplasia); Cirrhosis ([facility] [address] Diabetes mellitus ([facility] [address] [facility] GERD (gastroesophageal reflux disease); H/O cardiac catheterization; Hiatal hernia; Hyperlipidemia; Hypertension; IBS (irritable bowel syndrome); Neuropathy ([facility] [address] On home oxygen therapy; Pancreatitis; Primary osteoarthritis of right knee; and Thyroid disease. has a past surgical history that includes Colonoscopy; Abdomen surgery; hernia repair; Endoscopy, colon, diagnostic; Upper gastrointestinal endoscopy (2017); egd transoral biopsy single/multiple (N/A, 2017); and deep dissec foot infec,1 bursa (Bilateral, 2017). Restrictions  Restrictions/Precautions  Restrictions/Precautions: General Precautions, Fall Risk  Required Braces or Orthoses?: No  Position Activity Restriction  Other position/activity restrictions: O2 @ 2L per nc    Subjective   General  Chart Reviewed: Yes  Patient assessed for rehabilitation services?: Yes  Family / Caregiver Present: No  Diagnosis: Cellulitus  Subjective  Subjective: \"I am not able to get out of this bed without severe pain\"  General Comment  Comments: Writer consulted Randell Sheikh RN whom indicated pt. was medically stable for OT this date.   Pain Assessment  Patient Currently in Pain: Yes  Pain Assessment: 0-10  Pain Level: 8  Pain Type: Acute pain  Pain Location: Groin, Scrotum  Pain Descriptors: Aching, Burning  Pain Frequency: Continuous  Clinical Progression: Gradually worsening  Pain Intervention(s): Medication (see

## 2017-12-09 NOTE — PROGRESS NOTES
[facility]  Progress Note    Leora Mccoy  MRN: [id number]  Date: 12/9/2017    Reason for F/U :   Cellulitis, epididymoorchitis    Impression :   · Alcoholic cirrhosis with ascites. · Abdominal pain without any acute pathology noted  · Right-sided epididymoorchitis associated with a complex right-sided hydrocele  · Bilateral lower extremity venous stasis dermatitis  · Bilateral plantar foot wounds in the heel area as well as the great toe on the left. ? None of these appear infected. · Morbid obesity  · Diabetes mellitus type II    Recommendations:   · Continue antimicrobial therapy with levofloxacin orally  · The patient does have E. coli isolated in the urine which is the likely pathogen for the right-sided epididymoorchitis. · Await the susceptibility data and we should hopefully be able to discharge him on oral antibiotics as long as the organism remains susceptible. · He continues to have a significant amount of scrotal pain/discomfort. Summary of Stay:   Leora Mccoy is a 47y.o.-year-old male who was initially admitted on 12/7/2017. Yessenia Wetzelbethel has a history of alcoholic cirrhosis, morbid obesity, significant bilateral lower extremity edema. He came into the emergency complaining of some chest pains with associated shortness of breath. He also reported some abdominal pain and scrotal swelling with urinary retention. The patient is known to me from the past for bouts of cellulitis and he is followed at the wound care center reports that he had several ulcers debrided at the wound care center. He currently gets Ace wraps to the lower extremities bilaterally done by visiting nurse services 3 times a week. He does not report any fevers or chills.  He was evaluated in the emergency room and admitted for lower extremity cellulitis    Current evaluation:12/9/2017    BP (!) 104/53   Pulse 68   Temp 97.9 °F (36.6 °C) (Oral)   Resp 16   Ht 5' 11\" (1.803 m)   Wt (!) 389 lb 1.8 oz (176.5

## 2017-12-09 NOTE — PLAN OF CARE
Problem: Pain:  Goal: Control of acute pain  Control of acute pain   Outcome: Ongoing  Patient states understanding of pain scale and interventions. Pain assessed with hourly rounding and PRN. Patient states pain level is moderate. Will continue to monitor patient's scrotal pain. Problem: Falls - Risk of  Goal: Absence of falls  Outcome: Ongoing  Patient is fall risk per fall scale. Falling star on door. Fall sticker on armband. Hourly rounding performed. Personal belongings and call light within reach. Bed in low position.

## 2017-12-09 NOTE — PLAN OF CARE
[facility]    Second Visit Note  For more detailed information please refer to the progress note of the day      12/9/2017    2:30 PM    Name:   Atif Ha  MRN:     [id number]     Melitonide:      [de-identified]   Room:   [facility]-01   Day:  2  Admit Date:  12/7/2017  4:54 PM    PCP:   Kofi Short MD  Code Status:  Full Code        Pt vitals were reviewed   New labs were reviewed   Patient was seen    Updated plan :     1. Pt states urology told him he may need I&D still  2.  Cont abx        Matthieu Rumpf Blood, DO  12/9/2017  2:30 PM

## 2017-12-09 NOTE — PROGRESS NOTES
[id number] [facility]    Progress Note    12/9/2017    10:01 AM    Name:   Morgan Zaragoza  MRN:     [id number]     Acct:      [de-identified]   Room:   [address] [place] [address] Day:  2  Admit Date:  12/7/2017  4:54 PM    PCP:   Rosio Yanes MD  Code Status:  Full Code    Subjective:     C/C:   Chief Complaint   Patient presents with    Chest Pain    Groin Swelling    Leg Swelling    Shortness of Breath    Urinary Retention     Interval History Status: not changed. States he still has significant swelling and pain of scrotum    Brief History:     The patient is a 47 y.o. Non-/non  male who presents with Chest Pain; Groin Swelling; Leg Swelling; Shortness of Breath; and Urinary Retention   and he is admitted to the hospital for the management of  Swelling in groin, along with inability to urinate. The swelling has been there for 2 days. He stated he did have uti about 2 months ago, took 10 days bactrim and it resolved. Couple days ago developed urinary retention and burning. Has had chills and sweats also.     Sees wound care center for ble wounds every 3 weeks and his home rn changes dressings q 3 days    Review of Systems:     Constitutional:  negative for chills, fevers, sweats  Respiratory:  negative for cough, dyspnea on exertion, shortness of breath, wheezing  Cardiovascular:  negative for chest pain, chest pressure/discomfort, lower extremity edema, palpitations  Gastrointestinal:  negative for constipation, diarrhea, nausea, vomiting; c/o scrotal pain and swelling  Neurological:  negative for dizziness, headache    Medications: Allergies:     Allergies   Allergen Reactions    No Known Allergies        Current Meds:   Scheduled Meds:    oxybutynin  5 mg Oral BID    levofloxacin  750 mg Oral Daily    aspirin  81 mg Oral Daily    bumetanide  2 mg Oral BID    DULoxetine  60 mg Oral Daily    pantoprazole  40 mg Oral QAM AC    ferrous sulfate  325 mg Oral BID    insulin glargine  30 Units Subcutaneous Daily    levothyroxine  150 mcg Oral Daily    nadolol  40 mg Oral Daily    miconazole   Topical BID    pregabalin  150 mg Oral BID    QUEtiapine  150 mg Oral Nightly    rifaximin  550 mg Oral BID    linagliptin  5 mg Oral Daily    tamsulosin  0.4 mg Oral Daily    venlafaxine  150 mg Oral Daily    sodium chloride flush  10 mL Intravenous 2 times per day    enoxaparin  40 mg Subcutaneous BID    insulin lispro  0-6 Units Subcutaneous TID WC    insulin lispro  0-3 Units Subcutaneous Nightly    spironolactone  75 mg Oral BID     Continuous Infusions:    dextrose       PRN Meds: hydrOXYzine, clonazePAM, sodium chloride flush, potassium chloride **OR** potassium chloride **OR** potassium chloride, magnesium sulfate, acetaminophen, HYDROcodone 5 mg - acetaminophen **OR** HYDROcodone 5 mg - acetaminophen, morphine **OR** morphine, magnesium hydroxide, bisacodyl, ondansetron, nicotine, glucose, dextrose, glucagon (rDNA), dextrose    Data:     Past Medical History:   has a past medical history of Anxiety and depression; Back pain, chronic; BPH (benign prostatic hyperplasia); Cirrhosis ([facility] Diabetes mellitus ([facility] GERD (gastroesophageal reflux disease); H/O cardiac catheterization; Hiatal hernia; Hyperlipidemia; Hypertension; IBS (irritable bowel syndrome); Neuropathy ([facility] On home oxygen therapy; Pancreatitis; Primary osteoarthritis of right knee; and Thyroid disease. Social History:   reports that he has never smoked. He has never used smokeless tobacco. He reports that he does not drink alcohol or use drugs.      Family History:   Family History   Problem Relation Age of Onset    Adopted: Yes       Vitals:  BP (!) 104/53   Pulse 68   Temp 97.9 °F (36.6 °C) (Oral)   Resp 16   Ht 5' 11\" (1.803 m)   Wt (!) 389 lb 1.8 oz (176.5 kg)   SpO2 94%   BMI 54.27 kg/m²   Temp (24hrs), Av °F (36.7 °C), Min:97.7 °F (36.5 °C), Max:98.4 °F (36.9 °C)    Recent Labs      12/08/17   0803  12/08/17   1151  12/08/17   1724  12/08/17 2037   POCGLU  144*  232*  130*  220*       I/O (24Hr):     Intake/Output Summary (Last 24 hours) at 12/09/17 1001  Last data filed at 12/09/17 0515   Gross per 24 hour   Intake              825 ml   Output             1600 ml   Net             -775 ml       Labs:    Hematology:  Recent Labs      12/07/17   1725 12/08/17   0550   WBC  13.1*  8.9   RBC  3.87*  3.83*   HGB  11.3*  11.1*   HCT  35.4*  35.2*   MCV  91.4  91.9   MCH  29.2  29.1   MCHC  32.0  31.6   RDW  15.6*  15.6*   PLT  152  142   MPV  7.8  7.6   INR  1.0   --      Chemistry:  Recent Labs      12/07/17   1725 12/07/17 2023 12/08/17   0550   NA  131*   --   135   K  3.9   --   3.3*   CL  90*   --   91*   CO2  27   --   34*   GLUCOSE  158*   --   137*   BUN  12   --   9   CREATININE  1.11   --   1.02   ANIONGAP  14   --   10   LABGLOM  >60   --   >60   GFRAA  >60   --   >60   CALCIUM  8.9   --   8.8   PROBNP  371*   --    --    TROPONINT  <0.03  <0.03   --    MYOGLOBIN  44  44   --      Recent Labs      12/07/17   1725 12/07/17   2347  12/08/17   0550  12/08/17   0803  12/08/17   1151  12/08/17   1724 12/08/17 2037   PROT  7.3   --   6.6   --    --    --    --    LABALBU  3.3*   --   3.2*   --    --    --    --    LABA1C  6.8*   --    --    --    --    --    --    AST  15   --   14   --    --    --    --    ALT  19   --   17   --    --    --    --    ALKPHOS  134*   --   129   --    --    --    --    BILITOT  0.65   --   0.55   --    --    --    --    BILIDIR  0.31*   --    --    --    --    --    --    AMYLASE  24*   --    --    --    --    --    --    LIPASE  21   --    --    --    --    --    --    POCGLU   --   155*   --   144*  232*  130*  220*         Lab Results   Component Value Date/Time    SPECIAL NOT REPORTED 12/07/2017 06:47 PM     Lab Results   Component Value Date/Time    CULTURE NO GROWTH 2 DAYS 12/07/2017 06:47 PM    CULTURE  12/07/2017 06:47 PM     Performed at [facility], [address] [place] [address] [phone]       Lab Results   Component Value Date    PHART 7.330 06/18/2014    SIG1DRT 68.0 06/18/2014    PO2ART 84.0 06/18/2014    KKT7KSS 34.9 06/18/2014    NBEA NOT REPORTED 06/18/2014    PBEA 7.4 06/18/2014    N7LRNZWC 96.5 06/18/2014    FIO2 CANNULA 06/18/2014       Radiology:    Scrotal us:     Impression   Continued increased flow to the right testis and epididymis.  Right   epididymis is enlarged. [place] is also a moderate right hydrocele with   septations and low level echoes suggesting inflammation, possibly chronic.    Right scrotal skin thickening is re- demonstrated.  Left epididymal cyst is   noted. Silver Con[place] is been little change from prior study.  Findings are   compatible with right orchitis and epididymitis         Physical Examination:        General appearance:  alert, cooperative and no distress  Mental Status:  oriented to person, place and time and normal affect  Lungs:  clear to auscultation bilaterally, normal effort  Heart:  regular rate and rhythm, no murmur  Abdomen:  soft, nontender, nondistended, normal bowel sounds, no masses, hepatomegaly, splenomegaly; obese  Extremities:  no edema, redness, tenderness in the calves  Skin:  Scrotum still swollen about the same; some redness of scrotum; ttp    Assessment:        Primary Problem  Epididymoorchitis    Active Hospital Problems    Diagnosis Date Noted    Peripheral edema [R60.9] 06/18/2014     Priority: High    Bipolar disorder ([facility] [F31.9] 06/18/2014     Priority: Medium    Diabetes mellitus with neuropathy ([facility] [E11.40] 06/18/2014     Priority: Medium    Essential hypertension [I10] 06/18/2014     Priority: Medium    Hyperlipidemia [E78.5] 06/18/2014     Priority: Medium    Weakness [R53.1] 06/18/2014     Priority: Medium    Scrotal edema [N50.89] 12/09/2017    Morbid obesity with BMI of 50.0-59.9, adult ([facility] [E66.01, Z68.43] 12/08/2017    Urine retention [R33.9] 12/08/2017    Epididymoorchitis [N45.3]     Abdominal pain [R10.9]     Multiple and open wound of lower limb [S81.809A]     Cellulitis of perineum [L03.315] 12/07/2017    Venous stasis dermatitis of both lower extremities [I87.2] 08/19/2017    Hypothyroidism [E03.9]        Plan:        1. Cont po levaquin  2. Dc wells when ok with urology  3.  Pt does not feel like he could go home today    Benny Anderson DO  12/9/2017  10:01 AM

## 2017-12-09 NOTE — PROGRESS NOTES
testicle very indurated, no abscess, no evidence of fourniers. Very tender to touch. No evidence of inguinal hernia        Additional Lab/Culture results:     Imaging Reviewed:     Patience Sanabria MD independently reviewed the images and verified the radiology reports from:    Us Scrotum And Testicles    Result Date: 12/7/2017  EXAMINATION: ULTRASOUND OF THE SCROTUM/TESTICLES WITH COLOR DOPPLER FLOW EVALUATION 12/7/2017 COMPARISON: None HISTORY: ORDERING SYSTEM PROVIDED HISTORY: Pain, swelling, rule out torsion FINDINGS: The examination demonstrated asymmetrically increased flow to the right testis and epididymis consistent with epididymal orchitis. There is a complex right hydrocele developed with multiple septation as well likely a sequela of infection. There is apparently a small epididymal cyst on the left side, measuring approximately 0.6 x 1.0 x 0.6 cm. The right testis measures approximately 2.9 x 3.0 x 3.7 cm. The left testis measures 2.8 x 2.6 x 4.1 cm. There is no hydrocele seen on the left side. There is no evidence of testicular torsion. However, the dartos muscle and the skin are thickened on the right side from inflammation/infection. Asymmetrically increased flow to the right testis and epididymis, consistent with epididymo-orchitis as well as moderate-sized complex right hydrocele. There is a small benign-appearing left epididymal cyst.     Ct Abdomen Pelvis W Iv Contrast Additional Contrast? None    Result Date: 12/7/2017  EXAMINATION: CT OF THE ABDOMEN AND PELVIS WITH CONTRAST 12/7/2017 6:37 pm TECHNIQUE: CT of the abdomen and pelvis was performed with the administration of intravenous contrast. Multiplanar reformatted images are provided for review. Dose modulation, iterative reconstruction, and/or weight based adjustment of the mA/kV was utilized to reduce the radiation dose to as low as reasonably achievable.  COMPARISON: 06/18/2014 HISTORY: ORDERING SYSTEM PROVIDED HISTORY: Lower  Cellulitis of perineum    Epididymoorchitis    Abdominal pain    Multiple and open wound of lower limb    Scrotal edema       Plan:  abx per ID. No surgical intervention at this time.   Will follow along   Please call for questions        Carlos Wyatt MD  4:08 PM 12/9/2017

## 2017-12-10 LAB
GLUCOSE BLD-MCNC: 170 MG/DL (ref 75–110)
GLUCOSE BLD-MCNC: 174 MG/DL (ref 75–110)
GLUCOSE BLD-MCNC: 199 MG/DL (ref 75–110)

## 2017-12-10 PROCEDURE — 6360000002 HC RX W HCPCS: Performed by: NURSE PRACTITIONER

## 2017-12-10 PROCEDURE — 6360000002 HC RX W HCPCS: Performed by: INTERNAL MEDICINE

## 2017-12-10 PROCEDURE — 2580000003 HC RX 258: Performed by: NURSE PRACTITIONER

## 2017-12-10 PROCEDURE — 2060000000 HC ICU INTERMEDIATE R&B

## 2017-12-10 PROCEDURE — 99232 SBSQ HOSP IP/OBS MODERATE 35: CPT | Performed by: INTERNAL MEDICINE

## 2017-12-10 PROCEDURE — 82947 ASSAY GLUCOSE BLOOD QUANT: CPT

## 2017-12-10 PROCEDURE — 6370000000 HC RX 637 (ALT 250 FOR IP): Performed by: INTERNAL MEDICINE

## 2017-12-10 PROCEDURE — 6370000000 HC RX 637 (ALT 250 FOR IP): Performed by: NURSE PRACTITIONER

## 2017-12-10 RX ORDER — MORPHINE SULFATE 2 MG/ML
2 INJECTION, SOLUTION INTRAMUSCULAR; INTRAVENOUS
Status: DISCONTINUED | OUTPATIENT
Start: 2017-12-10 | End: 2017-12-12 | Stop reason: HOSPADM

## 2017-12-10 RX ORDER — MORPHINE SULFATE 2 MG/ML
1 INJECTION, SOLUTION INTRAMUSCULAR; INTRAVENOUS
Status: DISCONTINUED | OUTPATIENT
Start: 2017-12-10 | End: 2017-12-12 | Stop reason: HOSPADM

## 2017-12-10 RX ADMIN — INSULIN GLARGINE 30 UNITS: 100 INJECTION, SOLUTION SUBCUTANEOUS at 09:02

## 2017-12-10 RX ADMIN — HYDROCODONE BITARTRATE AND ACETAMINOPHEN 2 TABLET: 5; 325 TABLET ORAL at 15:39

## 2017-12-10 RX ADMIN — HYDROCODONE BITARTRATE AND ACETAMINOPHEN 2 TABLET: 5; 325 TABLET ORAL at 02:05

## 2017-12-10 RX ADMIN — LEVOTHYROXINE SODIUM 150 MCG: 150 TABLET ORAL at 09:02

## 2017-12-10 RX ADMIN — CLONAZEPAM 1 MG: 0.5 TABLET ORAL at 22:09

## 2017-12-10 RX ADMIN — MICONAZOLE NITRATE: 20.6 POWDER TOPICAL at 22:09

## 2017-12-10 RX ADMIN — RIFAXIMIN 550 MG: 550 TABLET ORAL at 09:01

## 2017-12-10 RX ADMIN — SPIRONOLACTONE 75 MG: 25 TABLET ORAL at 09:01

## 2017-12-10 RX ADMIN — ENOXAPARIN SODIUM 40 MG: 40 INJECTION SUBCUTANEOUS at 09:02

## 2017-12-10 RX ADMIN — LINAGLIPTIN 5 MG: 5 TABLET, FILM COATED ORAL at 09:02

## 2017-12-10 RX ADMIN — BUMETANIDE 2 MG: 1 TABLET ORAL at 20:04

## 2017-12-10 RX ADMIN — INSULIN LISPRO 1 UNITS: 100 INJECTION, SOLUTION INTRAVENOUS; SUBCUTANEOUS at 09:01

## 2017-12-10 RX ADMIN — BUMETANIDE 2 MG: 1 TABLET ORAL at 09:02

## 2017-12-10 RX ADMIN — FERROUS SULFATE TAB EC 325 MG (65 MG FE EQUIVALENT) 325 MG: 325 (65 FE) TABLET DELAYED RESPONSE at 20:04

## 2017-12-10 RX ADMIN — SPIRONOLACTONE 75 MG: 25 TABLET ORAL at 17:45

## 2017-12-10 RX ADMIN — MICONAZOLE NITRATE: 20.6 POWDER TOPICAL at 09:01

## 2017-12-10 RX ADMIN — HYDROCODONE BITARTRATE AND ACETAMINOPHEN 2 TABLET: 5; 325 TABLET ORAL at 20:04

## 2017-12-10 RX ADMIN — OXYBUTYNIN CHLORIDE 5 MG: 5 TABLET ORAL at 09:01

## 2017-12-10 RX ADMIN — TAMSULOSIN HYDROCHLORIDE 0.4 MG: 0.4 CAPSULE ORAL at 09:01

## 2017-12-10 RX ADMIN — MORPHINE SULFATE 4 MG: 4 INJECTION, SOLUTION INTRAMUSCULAR; INTRAVENOUS at 17:20

## 2017-12-10 RX ADMIN — MORPHINE SULFATE 4 MG: 4 INJECTION, SOLUTION INTRAMUSCULAR; INTRAVENOUS at 03:55

## 2017-12-10 RX ADMIN — MORPHINE SULFATE 2 MG: 2 INJECTION, SOLUTION INTRAMUSCULAR; INTRAVENOUS at 22:18

## 2017-12-10 RX ADMIN — INSULIN LISPRO 1 UNITS: 100 INJECTION, SOLUTION INTRAVENOUS; SUBCUTANEOUS at 12:18

## 2017-12-10 RX ADMIN — OXYBUTYNIN CHLORIDE 5 MG: 5 TABLET ORAL at 20:10

## 2017-12-10 RX ADMIN — ASPIRIN 81 MG: 81 TABLET, COATED ORAL at 09:02

## 2017-12-10 RX ADMIN — PREGABALIN 150 MG: 75 CAPSULE ORAL at 20:04

## 2017-12-10 RX ADMIN — MORPHINE SULFATE 4 MG: 4 INJECTION, SOLUTION INTRAMUSCULAR; INTRAVENOUS at 09:02

## 2017-12-10 RX ADMIN — PANTOPRAZOLE SODIUM 40 MG: 40 TABLET, DELAYED RELEASE ORAL at 09:01

## 2017-12-10 RX ADMIN — FERROUS SULFATE TAB EC 325 MG (65 MG FE EQUIVALENT) 325 MG: 325 (65 FE) TABLET DELAYED RESPONSE at 09:01

## 2017-12-10 RX ADMIN — RIFAXIMIN 550 MG: 550 TABLET ORAL at 20:10

## 2017-12-10 RX ADMIN — HYDROCODONE BITARTRATE AND ACETAMINOPHEN 2 TABLET: 5; 325 TABLET ORAL at 06:07

## 2017-12-10 RX ADMIN — NADOLOL 40 MG: 20 TABLET ORAL at 09:01

## 2017-12-10 RX ADMIN — CLONAZEPAM 1 MG: 0.5 TABLET ORAL at 09:01

## 2017-12-10 RX ADMIN — HYDROXYZINE HYDROCHLORIDE 25 MG: 25 TABLET ORAL at 15:39

## 2017-12-10 RX ADMIN — VENLAFAXINE HYDROCHLORIDE 150 MG: 75 CAPSULE, EXTENDED RELEASE ORAL at 09:01

## 2017-12-10 RX ADMIN — DULOXETINE HYDROCHLORIDE 60 MG: 60 CAPSULE, DELAYED RELEASE ORAL at 09:02

## 2017-12-10 RX ADMIN — QUETIAPINE 150 MG: 150 TABLET, EXTENDED RELEASE ORAL at 22:09

## 2017-12-10 RX ADMIN — INSULIN LISPRO 1 UNITS: 100 INJECTION, SOLUTION INTRAVENOUS; SUBCUTANEOUS at 22:10

## 2017-12-10 RX ADMIN — LEVOFLOXACIN 750 MG: 750 TABLET, FILM COATED ORAL at 09:02

## 2017-12-10 RX ADMIN — PREGABALIN 150 MG: 75 CAPSULE ORAL at 09:02

## 2017-12-10 RX ADMIN — ENOXAPARIN SODIUM 40 MG: 40 INJECTION SUBCUTANEOUS at 20:11

## 2017-12-10 RX ADMIN — HYDROCODONE BITARTRATE AND ACETAMINOPHEN 2 TABLET: 5; 325 TABLET ORAL at 11:37

## 2017-12-10 RX ADMIN — INSULIN LISPRO 1 UNITS: 100 INJECTION, SOLUTION INTRAVENOUS; SUBCUTANEOUS at 17:20

## 2017-12-10 RX ADMIN — MORPHINE SULFATE 4 MG: 4 INJECTION, SOLUTION INTRAMUSCULAR; INTRAVENOUS at 13:56

## 2017-12-10 RX ADMIN — Medication 10 ML: at 20:14

## 2017-12-10 RX ADMIN — HYDROXYZINE HYDROCHLORIDE 25 MG: 25 TABLET ORAL at 09:02

## 2017-12-10 ASSESSMENT — PAIN DESCRIPTION - ONSET
ONSET: ON-GOING
ONSET: GRADUAL
ONSET: GRADUAL
ONSET: AWAKENED FROM SLEEP
ONSET: AWAKENED FROM SLEEP

## 2017-12-10 ASSESSMENT — PAIN SCALES - GENERAL
PAINLEVEL_OUTOF10: 8
PAINLEVEL_OUTOF10: 8
PAINLEVEL_OUTOF10: 5
PAINLEVEL_OUTOF10: 8
PAINLEVEL_OUTOF10: 5
PAINLEVEL_OUTOF10: 8
PAINLEVEL_OUTOF10: 8
PAINLEVEL_OUTOF10: 9

## 2017-12-10 ASSESSMENT — PAIN DESCRIPTION - DESCRIPTORS
DESCRIPTORS: ACHING;BURNING
DESCRIPTORS: ACHING;BURNING
DESCRIPTORS: BURNING;ACHING
DESCRIPTORS: ACHING;BURNING
DESCRIPTORS: BURNING
DESCRIPTORS: BURNING;ACHING
DESCRIPTORS: ACHING
DESCRIPTORS: ACHING;BURNING
DESCRIPTORS: ACHING

## 2017-12-10 ASSESSMENT — PAIN DESCRIPTION - LOCATION
LOCATION: SCROTUM;OTHER (COMMENT)
LOCATION: SCROTUM
LOCATION: SCROTUM;OTHER (COMMENT)
LOCATION: SCROTUM

## 2017-12-10 ASSESSMENT — PAIN DESCRIPTION - PAIN TYPE
TYPE: ACUTE PAIN

## 2017-12-10 ASSESSMENT — PAIN DESCRIPTION - PROGRESSION
CLINICAL_PROGRESSION: GRADUALLY WORSENING
CLINICAL_PROGRESSION: NOT CHANGED
CLINICAL_PROGRESSION: GRADUALLY WORSENING
CLINICAL_PROGRESSION: NOT CHANGED

## 2017-12-10 ASSESSMENT — PAIN DESCRIPTION - FREQUENCY
FREQUENCY: CONTINUOUS

## 2017-12-10 NOTE — PLAN OF CARE
Problem: Pain:  Goal: Control of acute pain  Control of acute pain   Outcome: Ongoing  PRN medication available as needed. Will continue to monitor. Problem: Falls - Risk of  Goal: Absence of falls  Outcome: Ongoing  Patient reluctant to get out of bed. PT/OT ordered. Patient calling out appropriately for assistance when needed. Bedside commode and walker available.

## 2017-12-10 NOTE — FLOWSHEET NOTE
Assessed patient for pain levels. Patient had slurred speech. Asked writer to stay with him for a few minutes. Patient states \"I don't feel right\". Hand grasps are equal bilaterally. Pupils round, reactive (brisk), VSS taken. Patient oriented to person, place, time, situation.      12/10/17 0640   Vital Signs   Temp 97.9 °F (36.6 °C)   Temp Source Oral   Pulse 70   Heart Rate Source Monitor   Resp 20   BP (!) 100/49   BP Location Left Arm   BP Upper/Lower Upper   MAP (mmHg) 62

## 2017-12-10 NOTE — PLAN OF CARE
[facility]    Second Visit Note  For more detailed information please refer to the progress note of the day      12/10/2017    5:44 PM    Name:   Villa Child  MRN:     [id number]     Melitonide:      [de-identified]   Room:   [facility]/1026-01   Day:  3  Admit Date:  12/7/2017  4:54 PM    PCP:   Laly Love MD  Code Status:  Full Code        Pt vitals were reviewed   New labs were reviewed   Patient was seen    Updated plan :     1. Remove wells in AM and anticipate dc after therapy.         Swetha Samayoa DO  12/10/2017  5:44 PM

## 2017-12-10 NOTE — PROGRESS NOTES
[id number] [facility]    Progress Note    12/10/2017    6:14 AM    Name:   Sophia Cruz  MRN:     [id number]     Acct:      [de-identified]   Room:   [address] [place] [address] Day:  3  Admit Date:  12/7/2017  4:54 PM    PCP:   Vanessa Rosales MD  Code Status:  Full Code    Subjective:     C/C:   Chief Complaint   Patient presents with    Chest Pain    Groin Swelling    Leg Swelling    Shortness of Breath    Urinary Retention     Interval History Status: not changed. Patient continues to complain of severe pain and swelling of the scrotum. Brief History:     The patient is a 47 y. o.  Non-/non  male who presents with Chest Pain; Groin Swelling; Leg Swelling; Shortness of Breath; and Urinary Retention   and he is admitted to the hospital for the management of  Swelling in groin, along with inability to urinate.  The swelling has been there for 2 days. Ree Robbin stated he did have uti about 2 months ago, took 10 days bactrim and it resolved.  Couple days ago developed urinary retention and burning.  Has had chills and sweats also.     Sees wound care center for ble wounds every 3 weeks and his home rn changes dressings q 3 days    Review of Systems:     Constitutional:  negative for chills, fevers, sweats  Respiratory:  negative for cough, dyspnea on exertion, shortness of breath, wheezing  Cardiovascular:  negative for chest pain, chest pressure/discomfort, lower extremity edema, palpitations  Gastrointestinal:  negative for abdominal pain, constipation, diarrhea, nausea, vomiting  Neurological:  negative for dizziness, headache    Medications: Allergies:     Allergies   Allergen Reactions    No Known Allergies        Current Meds:   Scheduled Meds:    oxybutynin  5 mg Oral BID    levofloxacin  750 mg Oral Daily    aspirin  81 mg Oral Daily    bumetanide  2 mg Oral BID    DULoxetine  60 mg Oral Daily    pantoprazole  40 mg Oral QAM AC    ferrous sulfate  325 mg Oral BID    insulin glargine  30 Units Subcutaneous Daily    levothyroxine  150 mcg Oral Daily    nadolol  40 mg Oral Daily    miconazole   Topical BID    pregabalin  150 mg Oral BID    QUEtiapine  150 mg Oral Nightly    rifaximin  550 mg Oral BID    linagliptin  5 mg Oral Daily    tamsulosin  0.4 mg Oral Daily    venlafaxine  150 mg Oral Daily    sodium chloride flush  10 mL Intravenous 2 times per day    enoxaparin  40 mg Subcutaneous BID    insulin lispro  0-6 Units Subcutaneous TID WC    insulin lispro  0-3 Units Subcutaneous Nightly    spironolactone  75 mg Oral BID     Continuous Infusions:    dextrose       PRN Meds: hydrOXYzine, clonazePAM, sodium chloride flush, potassium chloride **OR** potassium chloride **OR** potassium chloride, magnesium sulfate, acetaminophen, HYDROcodone 5 mg - acetaminophen **OR** HYDROcodone 5 mg - acetaminophen, morphine **OR** morphine, magnesium hydroxide, bisacodyl, ondansetron, nicotine, glucose, dextrose, glucagon (rDNA), dextrose    Data:     Past Medical History:   has a past medical history of Anxiety and depression; Back pain, chronic; BPH (benign prostatic hyperplasia); Cirrhosis ([facility] Diabetes mellitus ([facility] GERD (gastroesophageal reflux disease); H/O cardiac catheterization; Hiatal hernia; Hyperlipidemia; Hypertension; IBS (irritable bowel syndrome); Neuropathy ([facility] On home oxygen therapy; Pancreatitis; Primary osteoarthritis of right knee; and Thyroid disease. Social History:   reports that he has never smoked. He has never used smokeless tobacco. He reports that he does not drink alcohol or use drugs.      Family History:   Family History   Problem Relation Age of Onset    Adopted: Yes       Vitals:  BP (!) 98/51   Pulse 67   Temp 97.3 °F (36.3 °C) (Oral)   Resp 20   Ht 5' 11\" (1.803 m)   Wt (!) 389 lb 1.8 oz (176.5 kg)   SpO2 96%   BMI 54.27 kg/m²   Temp (24hrs), Av °F (36.7 °C), Min:97.3 °F (36.3 °C), Max:98.6 °F

## 2017-12-10 NOTE — PROGRESS NOTES
epididimorchitis  Right testicle less indurated, no abscess, no evidence of fourniers. Very tender to touch, but much improved. No evidence of inguinal hernia        Additional Lab/Culture results:     Imaging Reviewed:         Impression:    Patient Active Problem List   Diagnosis    Alcoholic cirrhosis of liver [facility]    Peripheral edema    Bipolar disorder ([facility]    Diabetes mellitus with neuropathy ([facility]    Essential hypertension    Hyperlipidemia    Weakness    Hyponatremia    FABI (obstructive sleep apnea)    Primary osteoarthritis of right knee    DM (diabetes mellitus), type 2 with neurological complications ([facility]    Hypothyroidism    Urine retention    Anemia    Cellulitis of right lower extremity    Gastroesophageal reflux disease    Slow transit constipation    Constipation    Iron deficiency anemia due to chronic blood loss    Gastroesophageal reflux disease without esophagitis    Secondary esophageal varices without bleeding (HCC)    Portal hypertension ([facility]    Morbid obesity with BMI of 50.0-59.9, adult (HCC)    Venous stasis dermatitis of both lower extremities    Cellulitis of left lower extremity    Cellulitis of perineum    Epididymoorchitis    Abdominal pain    Multiple and open wound of lower limb    Scrotal edema       Plan:  abx per ID. No surgical intervention at this time.   Will follow along   Please call for questions        Cb Henry MD  4:55 PM 12/10/2017

## 2017-12-11 LAB
GLUCOSE BLD-MCNC: 173 MG/DL (ref 75–110)
GLUCOSE BLD-MCNC: 208 MG/DL (ref 75–110)
GLUCOSE BLD-MCNC: 272 MG/DL (ref 75–110)
GLUCOSE BLD-MCNC: 307 MG/DL (ref 75–110)

## 2017-12-11 PROCEDURE — 6370000000 HC RX 637 (ALT 250 FOR IP): Performed by: INTERNAL MEDICINE

## 2017-12-11 PROCEDURE — 82947 ASSAY GLUCOSE BLOOD QUANT: CPT

## 2017-12-11 PROCEDURE — 6370000000 HC RX 637 (ALT 250 FOR IP): Performed by: UROLOGY

## 2017-12-11 PROCEDURE — 6360000002 HC RX W HCPCS: Performed by: INTERNAL MEDICINE

## 2017-12-11 PROCEDURE — 6370000000 HC RX 637 (ALT 250 FOR IP): Performed by: NURSE PRACTITIONER

## 2017-12-11 PROCEDURE — 6360000002 HC RX W HCPCS: Performed by: NURSE PRACTITIONER

## 2017-12-11 PROCEDURE — 2580000003 HC RX 258: Performed by: NURSE PRACTITIONER

## 2017-12-11 PROCEDURE — 99232 SBSQ HOSP IP/OBS MODERATE 35: CPT | Performed by: INTERNAL MEDICINE

## 2017-12-11 PROCEDURE — 51702 INSERT TEMP BLADDER CATH: CPT

## 2017-12-11 PROCEDURE — 2060000000 HC ICU INTERMEDIATE R&B

## 2017-12-11 RX ADMIN — HYDROCODONE BITARTRATE AND ACETAMINOPHEN 2 TABLET: 5; 325 TABLET ORAL at 01:10

## 2017-12-11 RX ADMIN — CLONAZEPAM 1 MG: 0.5 TABLET ORAL at 21:39

## 2017-12-11 RX ADMIN — MORPHINE SULFATE 2 MG: 2 INJECTION, SOLUTION INTRAMUSCULAR; INTRAVENOUS at 10:33

## 2017-12-11 RX ADMIN — RIFAXIMIN 550 MG: 550 TABLET ORAL at 08:41

## 2017-12-11 RX ADMIN — OXYBUTYNIN CHLORIDE 5 MG: 5 TABLET ORAL at 08:42

## 2017-12-11 RX ADMIN — HYDROXYZINE HYDROCHLORIDE 25 MG: 25 TABLET ORAL at 04:43

## 2017-12-11 RX ADMIN — INSULIN LISPRO 2 UNITS: 100 INJECTION, SOLUTION INTRAVENOUS; SUBCUTANEOUS at 21:31

## 2017-12-11 RX ADMIN — HYDROCODONE BITARTRATE AND ACETAMINOPHEN 2 TABLET: 5; 325 TABLET ORAL at 08:42

## 2017-12-11 RX ADMIN — HYDROCODONE BITARTRATE AND ACETAMINOPHEN 2 TABLET: 5; 325 TABLET ORAL at 13:00

## 2017-12-11 RX ADMIN — OXYBUTYNIN CHLORIDE 5 MG: 5 TABLET ORAL at 21:18

## 2017-12-11 RX ADMIN — LEVOTHYROXINE SODIUM 150 MCG: 150 TABLET ORAL at 06:47

## 2017-12-11 RX ADMIN — MICONAZOLE NITRATE: 20.6 POWDER TOPICAL at 21:25

## 2017-12-11 RX ADMIN — MORPHINE SULFATE 2 MG: 2 INJECTION, SOLUTION INTRAMUSCULAR; INTRAVENOUS at 14:48

## 2017-12-11 RX ADMIN — INSULIN GLARGINE 30 UNITS: 100 INJECTION, SOLUTION SUBCUTANEOUS at 10:05

## 2017-12-11 RX ADMIN — LEVOFLOXACIN 750 MG: 750 TABLET, FILM COATED ORAL at 08:42

## 2017-12-11 RX ADMIN — PANTOPRAZOLE SODIUM 40 MG: 40 TABLET, DELAYED RELEASE ORAL at 06:22

## 2017-12-11 RX ADMIN — BUMETANIDE 2 MG: 1 TABLET ORAL at 21:18

## 2017-12-11 RX ADMIN — INSULIN LISPRO 2 UNITS: 100 INJECTION, SOLUTION INTRAVENOUS; SUBCUTANEOUS at 13:00

## 2017-12-11 RX ADMIN — HYDROCODONE BITARTRATE AND ACETAMINOPHEN 2 TABLET: 5; 325 TABLET ORAL at 17:13

## 2017-12-11 RX ADMIN — LIDOCAINE HYDROCHLORIDE: 20 JELLY TOPICAL at 11:36

## 2017-12-11 RX ADMIN — INSULIN LISPRO 4 UNITS: 100 INJECTION, SOLUTION INTRAVENOUS; SUBCUTANEOUS at 17:13

## 2017-12-11 RX ADMIN — SPIRONOLACTONE 75 MG: 25 TABLET ORAL at 18:43

## 2017-12-11 RX ADMIN — MICONAZOLE NITRATE: 20.6 POWDER TOPICAL at 11:37

## 2017-12-11 RX ADMIN — HYDROXYZINE HYDROCHLORIDE 25 MG: 25 TABLET ORAL at 18:43

## 2017-12-11 RX ADMIN — VENLAFAXINE HYDROCHLORIDE 150 MG: 75 CAPSULE, EXTENDED RELEASE ORAL at 08:41

## 2017-12-11 RX ADMIN — PREGABALIN 150 MG: 75 CAPSULE ORAL at 10:36

## 2017-12-11 RX ADMIN — ASPIRIN 81 MG: 81 TABLET, COATED ORAL at 08:42

## 2017-12-11 RX ADMIN — TAMSULOSIN HYDROCHLORIDE 0.4 MG: 0.4 CAPSULE ORAL at 08:41

## 2017-12-11 RX ADMIN — ENOXAPARIN SODIUM 40 MG: 40 INJECTION SUBCUTANEOUS at 21:17

## 2017-12-11 RX ADMIN — MORPHINE SULFATE 2 MG: 2 INJECTION, SOLUTION INTRAMUSCULAR; INTRAVENOUS at 04:36

## 2017-12-11 RX ADMIN — FERROUS SULFATE TAB EC 325 MG (65 MG FE EQUIVALENT) 325 MG: 325 (65 FE) TABLET DELAYED RESPONSE at 21:18

## 2017-12-11 RX ADMIN — DULOXETINE HYDROCHLORIDE 60 MG: 60 CAPSULE, DELAYED RELEASE ORAL at 17:13

## 2017-12-11 RX ADMIN — Medication 10 ML: at 21:18

## 2017-12-11 RX ADMIN — LINAGLIPTIN 5 MG: 5 TABLET, FILM COATED ORAL at 08:42

## 2017-12-11 RX ADMIN — QUETIAPINE 150 MG: 150 TABLET, EXTENDED RELEASE ORAL at 21:31

## 2017-12-11 RX ADMIN — MORPHINE SULFATE 2 MG: 2 INJECTION, SOLUTION INTRAMUSCULAR; INTRAVENOUS at 19:34

## 2017-12-11 RX ADMIN — FERROUS SULFATE TAB EC 325 MG (65 MG FE EQUIVALENT) 325 MG: 325 (65 FE) TABLET DELAYED RESPONSE at 08:42

## 2017-12-11 RX ADMIN — RIFAXIMIN 550 MG: 550 TABLET ORAL at 21:18

## 2017-12-11 RX ADMIN — INSULIN LISPRO 1 UNITS: 100 INJECTION, SOLUTION INTRAVENOUS; SUBCUTANEOUS at 10:05

## 2017-12-11 RX ADMIN — ENOXAPARIN SODIUM 40 MG: 40 INJECTION SUBCUTANEOUS at 08:43

## 2017-12-11 RX ADMIN — PREGABALIN 150 MG: 75 CAPSULE ORAL at 21:18

## 2017-12-11 RX ADMIN — BUMETANIDE 2 MG: 1 TABLET ORAL at 08:41

## 2017-12-11 RX ADMIN — HYDROCODONE BITARTRATE AND ACETAMINOPHEN 2 TABLET: 5; 325 TABLET ORAL at 21:27

## 2017-12-11 RX ADMIN — Medication 10 ML: at 08:43

## 2017-12-11 ASSESSMENT — PAIN DESCRIPTION - FREQUENCY
FREQUENCY: CONTINUOUS
FREQUENCY: CONTINUOUS

## 2017-12-11 ASSESSMENT — PAIN DESCRIPTION - PROGRESSION
CLINICAL_PROGRESSION: NOT CHANGED
CLINICAL_PROGRESSION: NOT CHANGED

## 2017-12-11 ASSESSMENT — PAIN SCALES - GENERAL
PAINLEVEL_OUTOF10: 7
PAINLEVEL_OUTOF10: 8
PAINLEVEL_OUTOF10: 7
PAINLEVEL_OUTOF10: 7
PAINLEVEL_OUTOF10: 10
PAINLEVEL_OUTOF10: 8
PAINLEVEL_OUTOF10: 8
PAINLEVEL_OUTOF10: 7
PAINLEVEL_OUTOF10: 7
PAINLEVEL_OUTOF10: 8

## 2017-12-11 ASSESSMENT — PAIN DESCRIPTION - LOCATION
LOCATION: SCROTUM
LOCATION: SCROTUM

## 2017-12-11 ASSESSMENT — PAIN DESCRIPTION - ONSET
ONSET: ON-GOING
ONSET: ON-GOING

## 2017-12-11 ASSESSMENT — PAIN DESCRIPTION - DESCRIPTORS
DESCRIPTORS: ACHING;BURNING
DESCRIPTORS: ACHING;BURNING

## 2017-12-11 ASSESSMENT — PAIN DESCRIPTION - PAIN TYPE
TYPE: ACUTE PAIN
TYPE: ACUTE PAIN

## 2017-12-11 NOTE — PROGRESS NOTES
m)   Wt (!) 414 lb 7.4 oz (188 kg)   SpO2 94%   BMI 57.81 kg/m²     Temperature Range: Temp: 97.3 °F (36.3 °C) Temp  Av.9 °F (36.6 °C)  Min: 97.3 °F (36.3 °C)  Max: 98.4 °F (36.9 °C)  The patient is seen and evaluated at bedside he is awake and alert in no acute distress. He is seen sitting on the commode and reports that his Clemons catheter was removed and he is had a lot of pain with urination and he describes this as a searing/cutting type pain. He has not had any further fevers or chills. Physical Examination :     Physical Exam   Constitutional: He is oriented to person, place, and time. He appears well-developed. He appears distressed. Cardiovascular: Regular rhythm and normal heart sounds. Pulmonary/Chest: Effort normal and breath sounds normal.   Abdominal: Soft. Bowel sounds are normal.   Genitourinary:   Genitourinary Comments: There is right-sided testicular swelling with tenderness to palpation. There is some scrotal edema. Musculoskeletal: Normal range of motion. Neurological: He is alert and oriented to person, place, and time. Skin:   Bilateral lower extremity Ace wraps in place       Laboratory data:   I have independently reviewed the following labs:  CBC with Differential:   No results for input(s): WBC, HGB, HCT, PLT, SEGSPCT, BANDSPCT, LYMPHOPCT, MONOPCT, EOSPCT in the last 72 hours. BMP:   No results for input(s): NA, K, CL, CO2, BUN, CREATININE, MG in the last 72 hours. Invalid input(s): CA  Hepatic Function Panel:   No results for input(s): PROT, LABALBU, BILIDIR, IBILI, BILITOT, ALKPHOS, ALT, AST in the last 72 hours. No results for input(s): VANCOTROUGH in the last 72 hours.    Lab Results   Component Value Date    CRP 74.3 (H) 2017     Lab Results   Component Value Date    SEDRATE 83 (H) 2017       Imaging Studies:   ULTRASOUND OF THE SCROTUM/TESTICLES WITH COLOR DOPPLER FLOW EVALUATION 2017  Impression   Continued increased flow to the right testis and epididymis.  Right   epididymis is enlarged. Trang Noé is also a moderate right hydrocele with   septations and low level echoes suggesting inflammation, possibly chronic. Right scrotal skin thickening is re- demonstrated.  Left epididymal cyst is   noted. Trang Noé is been little change from prior study.  Findings are   compatible with right orchitis and epididymitis. Cultures:     Cult,Blood #1 [[id number]] Collected: 12/07/17 1847   Order Status: Completed Specimen: Blood Updated: 12/10/17 0022    Specimen Description . BLOOD RT HAND 6ML RS Performed at [address] [place] [id number] [facility]    Specimen Description [address] [phone]    Special Requests NOT REPORTED    Culture NO GROWTH 3 DAYS    Culture Performed at [address] [phone]    Status Pending   Cult,Blood #2 [[id number]] Collected: 12/07/17 1830   Order Status: Completed Specimen: Blood Updated: 12/10/17 0022    Specimen Description . BLOOD LT FOREARM 1847 KB Performed at [address] [place] [address] [address] [address] [facility] [address]    Specimen Description [address] [phone]    Special Requests NOT REPORTED    Culture NO GROWTH 3 DAYS    Culture Performed at [address] [phone]    Status Pending   Cult,Urine,Cath [[id number]] (Abnormal)  Collected: 12/07/17 1815   Order Status: Completed Specimen: Urine, indwelling catheter Updated: 12/09/17 1021    Specimen Description . INDWELLING CATH URINE Performed at [address] [place] [address]    Specimen Description [address] [phone]    Special Requests NOT REPORTED    Culture ESCHERICHIA COLI >[id number] CFU/ML (A)    Culture Performed at [address] [phone]    Status FINAL 12/09/2017    Organism EC   ESCHERICHIA COLI     Antibiotic Interpretation SHAHNAZ Status   Confirmatory Extended Spectrum Beta-Lactamase Sensitive NEGATIVE Final   amikacin  NOT REPORTED Final   ampicillin Sensitive <=2 SUSCEPTIBLE Final   ampicillin-sulbactam  NOT REPORTED Final   aztreonam Sensitive <=1 SUSCEPTIBLE Final   ceFAZolin Sensitive <=4 SUSCEPTIBLE Final   cefTRIAXone Sensitive <=1 SUSCEPTIBLE Final   cefepime  NOT REPORTED Final   ciprofloxacin Sensitive <=0.25 SUSCEPTIBLE Final   ertapenem  NOT REPORTED Final   gentamicin Sensitive <=1 SUSCEPTIBLE Final   meropenem  NOT REPORTED Final   nitrofurantoin Sensitive <=16 SUSCEPTIBLE Final   piperacillin-tazobactam Sensitive <=4 SUSCEPTIBLE Final   tigecycline  NOT REPORTED Final   tobramycin Sensitive <=1 SUSCEPTIBLE Final   trimethoprim-sulfamethoxazole Sensitive <=20 SUSCEPTIBLE Final         Medications:      oxybutynin  5 mg Oral BID    levofloxacin  750 mg Oral Daily    aspirin  81 mg Oral Daily    bumetanide  2 mg Oral BID    DULoxetine  60 mg Oral Daily    pantoprazole  40 mg Oral QAM AC    ferrous sulfate  325 mg Oral BID    insulin glargine  30 Units Subcutaneous Daily    levothyroxine  150 mcg Oral Daily    nadolol  40 mg Oral Daily    miconazole   Topical BID    pregabalin  150 mg Oral BID    QUEtiapine  150 mg Oral Nightly    rifaximin  550 mg Oral BID    linagliptin  5 mg Oral Daily    tamsulosin  0.4 mg Oral Daily    venlafaxine  150 mg Oral Daily    sodium chloride flush  10 mL Intravenous 2 times per day    enoxaparin  40 mg Subcutaneous BID    insulin lispro  0-6 Units Subcutaneous TID WC    insulin lispro  0-3 Units Subcutaneous Nightly    spironolactone  75 mg Oral BID     Thank you for allowing us to participate in the care of this patient. Please call with questions.     Sondra Hernandez MD  [employer]  OFFICE: [phone]  CELL: [phone]    Electronically signed by Sondra Hernandez MD on 12/11/2017 at 11:35 AM

## 2017-12-11 NOTE — PROGRESS NOTES
[facility]    Progress Note    12/11/2017    12:10 PM    Name:   Ximena Sandra  MRN:     [id number]     [place]:      [de-identified]   Room:   [address] [place] [address] Day:  4  Admit Date:  12/7/2017  4:54 PM    PCP:   Keira Oneill MD  Code Status:  Full Code    Subjective:     C/C:   Chief Complaint   Patient presents with    Chest Pain    Groin Swelling    Leg Swelling    Shortness of Breath    Urinary Retention     Interval History Status: not changed. Continues to have testicular/scrotal pain with swelling. Had acute urinary retention after void trial today and required a Clemons to be reinserted. Brief History:     The patient is a 47 y. o.  Non-/non  male who presents with Chest Pain; Groin Swelling; Leg Swelling; Shortness of Breath; and Urinary Retention   and he is admitted to the hospital for the management of  Swelling in groin, along with inability to urinate.  The swelling has been there for 2 days. Luz Elena Alves stated he did have uti about 2 months ago, took 10 days bactrim and it resolved.  Couple days ago developed urinary retention and burning.  Has had chills and sweats also.     Sees wound care center for ble wounds every 3 weeks and his home rn changes dressings q 3 days    Review of Systems:     Constitutional:  negative for chills, fevers, sweats  Respiratory:  negative for cough, dyspnea on exertion, shortness of breath, wheezing  Cardiovascular:  negative for chest pain, chest pressure/discomfort, lower extremity edema, palpitations  Gastrointestinal:  negative for abdominal pain, constipation, diarrhea, nausea, vomiting  Neurological:  negative for dizziness, headache    Medications: Allergies:     Allergies   Allergen Reactions    No Known Allergies        Current Meds:   Scheduled Meds:    oxybutynin  5 mg Oral BID    levofloxacin  750 mg Oral Daily    aspirin  81 mg Oral Daily    bumetanide  2 mg Oral BID    DULoxetine  60 mg Oral Daily    pantoprazole  40 mg Oral QAM AC    ferrous sulfate  325 mg Oral BID    insulin glargine  30 Units Subcutaneous Daily    levothyroxine  150 mcg Oral Daily    nadolol  40 mg Oral Daily    miconazole   Topical BID    pregabalin  150 mg Oral BID    QUEtiapine  150 mg Oral Nightly    rifaximin  550 mg Oral BID    linagliptin  5 mg Oral Daily    tamsulosin  0.4 mg Oral Daily    venlafaxine  150 mg Oral Daily    sodium chloride flush  10 mL Intravenous 2 times per day    enoxaparin  40 mg Subcutaneous BID    insulin lispro  0-6 Units Subcutaneous TID WC    insulin lispro  0-3 Units Subcutaneous Nightly    spironolactone  75 mg Oral BID     Continuous Infusions:    dextrose       PRN Meds: lidocaine, morphine **OR** morphine, hydrOXYzine, clonazePAM, sodium chloride flush, potassium chloride **OR** potassium chloride **OR** potassium chloride, magnesium sulfate, acetaminophen, HYDROcodone 5 mg - acetaminophen **OR** HYDROcodone 5 mg - acetaminophen, magnesium hydroxide, bisacodyl, ondansetron, nicotine, glucose, dextrose, glucagon (rDNA), dextrose    Data:     Past Medical History:   has a past medical history of Anxiety and depression; Back pain, chronic; BPH (benign prostatic hyperplasia); Cirrhosis ([facility] Diabetes mellitus ([facility] GERD (gastroesophageal reflux disease); H/O cardiac catheterization; Hiatal hernia; Hyperlipidemia; Hypertension; IBS (irritable bowel syndrome); Neuropathy ([facility] On home oxygen therapy; Pancreatitis; Primary osteoarthritis of right knee; and Thyroid disease. Social History:   reports that he has never smoked. He has never used smokeless tobacco. He reports that he does not drink alcohol or use drugs.      Family History:   Family History   Problem Relation Age of Onset    Adopted: Yes       Vitals:  BP (!) 99/54   Pulse 62   Temp 97.3 °F (36.3 °C) (Oral)   Resp 20   Ht 5' 11\" (1.803 m)   Wt (!) 414 lb 7.4 oz (188 kg)   SpO2 94%   BMI 57.81 kg/m²   Temp (24hrs), Av.8 °F (36.6 °C), Min:97.3 °F (36.3 °C), Max:98.4 °F (36.9 °C)    Recent Labs      12/10/17   0813  12/10/17   1208  12/10/17   1658  17   0725   POCGLU  170*  174*  199*  173*       I/O (24Hr): Intake/Output Summary (Last 24 hours) at 17 1210  Last data filed at 17 1139   Gross per 24 hour   Intake                0 ml   Output             4150 ml   Net            -4150 ml       Labs:    Hematology:No results for input(s): WBC, RBC, HGB, HCT, MCV, MCH, MCHC, RDW, PLT, MPV, SEDRATE, CRP, INR, DDIMER, PK5UXKDF, LABABSO in the last 72 hours. Invalid input(s): PT  Chemistry:No results for input(s): NA, K, CL, CO2, GLUCOSE, BUN, CREATININE, MG, ANIONGAP, LABGLOM, GFRAA, CALCIUM, CAION, PHOS, PSA, PROBNP, TROPONINI, TROPONINT, CKTOTAL, CKMB, CKMBINDEX, MYOGLOBIN, DIGOXIN, LACTACIDWB in the last 72 hours.   Recent Labs      17   1652  12/09/17   2013  12/10/17   0813  12/10/17   1208  12/10/17   1658  17   0725   POCGLU  195*  169*  170*  174*  199*  173*         Lab Results   Component Value Date/Time    SPECIAL NOT REPORTED 2017 06:47 PM     Lab Results   Component Value Date/Time    CULTURE NO GROWTH 3 DAYS 2017 06:47 PM    CULTURE  2017 06:47 PM     Performed at [facility], [address] [place] [address] [phone]       Lab Results   Component Value Date    PHART 7.330 2014    XVI8BDT 68.0 2014    PO2ART 84.0 2014    CDQ6RUM 34.9 2014    NBEA NOT REPORTED 2014    PBEA 7.4 2014    B5MVGUQC 96.5 2014    FIO2 CANNULA 2014       Radiology:    No new radiology reports    Physical Examination:        General appearance:  alert, cooperative and no distress  Mental Status:  oriented to person, place and time and normal affect  Lungs:  clear to auscultation bilaterally, normal effort  Heart:  regular rate and rhythm, no murmur  Abdomen:  soft, nontender, nondistended, normal bowel sounds, no masses, hepatomegaly, splenomegaly  Extremities:  no edema, redness, tenderness in the calves, venous stasis changes  Skin:  no gross lesions, rashes, induration, positive scrotal swelling and erythema    Assessment:        Primary Problem  Epididymoorchitis    Active Hospital Problems    Diagnosis Date Noted    Peripheral edema [R60.9] 06/18/2014     Priority: High    Bipolar disorder ([facility] [F31.9] 06/18/2014     Priority: Medium    Diabetes mellitus with neuropathy ([facility] [E11.40] 06/18/2014     Priority: Medium    Essential hypertension [I10] 06/18/2014     Priority: Medium    Hyperlipidemia [E78.5] 06/18/2014     Priority: Medium    Weakness [R53.1] 06/18/2014     Priority: Medium    Scrotal edema [N50.89] 12/09/2017    Morbid obesity with BMI of 50.0-59.9, adult ([facility] [E66.01, Z68.43] 12/08/2017    Urine retention [R33.9] 12/08/2017    Epididymoorchitis [N45.3]     Abdominal pain [R10.9]     Multiple and open wound of lower limb [S81.809A]     Cellulitis of perineum [L03.315] 12/07/2017    Venous stasis dermatitis of both lower extremities [I87.2] 08/19/2017    Hypothyroidism [E03.9]        Plan:        1. Antibiotics per ID  2. Clemons to gravity  3. Pain control  4. GI and DVT prophylaxis  5. Urology evaluation  6. Discharge planning, home with Clemons and void trial in a few days.     Swetha Samayoa DO  12/11/2017  12:10 PM

## 2017-12-11 NOTE — PROGRESS NOTES
Physical Therapy  DATE: 2017    NAME: Duane Serna  MRN: [id number]   : 1965    Patient not seen this date for Physical Therapy due to:  [] Blood transfusion in progress  [x] Cancel by RN ( Hold per Olga RN due to pt. experiencing severe pain)  [] Hemodialysis  []  Refusal by Patient   [] Spine Precautions   [] Strict Bedrest  [] Surgery  [] Testing      [] Other        [] PT being discontinued at this time. Patient independent. No further needs. [] PT being discontinued at this time as the patient has been transferred to hospice care. No further needs.     Martha Both, PT

## 2017-12-11 NOTE — PROGRESS NOTES
Pt awakens upon entering room. Informed pt that nurse is to remove Clemons for trial void. Pt refuses to allow nurse to remove Clemons catheter at this time, stating \"I want to sleep some more. And how will I be able to urinate? \"  Informed pt that nurse would assist pt with urinal.  Pt continues to allow nurse to remove Clemons catheter at this time. Informed pt that pt will need time to void this morning. Pt agrees to allow Clemons catheter to be removed at 0630.

## 2017-12-11 NOTE — PLAN OF CARE
Problem: Pain:  Goal: Pain level will decrease  Pain level will decrease   Outcome: Ongoing  Monitoring pain with each assessment and prn. JUSTYNA 0-10 pain scale utilized. Non-pharmacological measures to be encouraged prior to pharmacological measures. Goal: Control of acute pain  Control of acute pain   Outcome: Ongoing    Goal: Control of chronic pain  Control of chronic pain   Outcome: Ongoing      Problem: Falls - Risk of  Goal: Absence of falls  Outcome: Ongoing  Pt fall risk, fall band present, falling star, safety alarm activated and in use as needed. Hourly rounding performed. Pt encouraged to use call light. See May Haney fall risk assessment. Problem: Fluid Volume - Imbalance:  Goal: Absence of imbalanced fluid volume signs and symptoms  Absence of imbalanced fluid volume signs and symptoms   Outcome: Ongoing  Assessed for signs & symptoms of fluid imbalance and/or fluid loss. Assessed for signs of dehydration. Promoted fluid intake per protocol. IVF administered as ordered. Problem: Musculor/Skeletal Functional Status  Goal: Highest potential functional level  Outcome: Ongoing  Assessed musculoskeletal functional level. Assessed ROM & ability to care for self.   Goal: Highest potential functional level  Outcome: Ongoing

## 2017-12-12 VITALS
HEART RATE: 73 BPM | BODY MASS INDEX: 44.1 KG/M2 | SYSTOLIC BLOOD PRESSURE: 92 MMHG | DIASTOLIC BLOOD PRESSURE: 74 MMHG | OXYGEN SATURATION: 99 % | WEIGHT: 315 LBS | HEIGHT: 71 IN | TEMPERATURE: 98.6 F | RESPIRATION RATE: 20 BRPM

## 2017-12-12 LAB
GLUCOSE BLD-MCNC: 161 MG/DL (ref 75–110)
GLUCOSE BLD-MCNC: 254 MG/DL (ref 75–110)

## 2017-12-12 PROCEDURE — 97530 THERAPEUTIC ACTIVITIES: CPT

## 2017-12-12 PROCEDURE — 82947 ASSAY GLUCOSE BLOOD QUANT: CPT

## 2017-12-12 PROCEDURE — 99232 SBSQ HOSP IP/OBS MODERATE 35: CPT | Performed by: INTERNAL MEDICINE

## 2017-12-12 PROCEDURE — 6370000000 HC RX 637 (ALT 250 FOR IP): Performed by: NURSE PRACTITIONER

## 2017-12-12 PROCEDURE — 97116 GAIT TRAINING THERAPY: CPT

## 2017-12-12 PROCEDURE — 6360000002 HC RX W HCPCS: Performed by: NURSE PRACTITIONER

## 2017-12-12 PROCEDURE — 6360000002 HC RX W HCPCS: Performed by: INTERNAL MEDICINE

## 2017-12-12 PROCEDURE — 2580000003 HC RX 258: Performed by: NURSE PRACTITIONER

## 2017-12-12 PROCEDURE — 6370000000 HC RX 637 (ALT 250 FOR IP): Performed by: INTERNAL MEDICINE

## 2017-12-12 RX ORDER — LEVOFLOXACIN 750 MG/1
750 TABLET ORAL DAILY
Qty: 10 TABLET | Refills: 0 | Status: SHIPPED | OUTPATIENT
Start: 2017-12-13 | End: 2017-12-23

## 2017-12-12 RX ORDER — HYDROXYZINE HYDROCHLORIDE 25 MG/1
25 TABLET, FILM COATED ORAL 3 TIMES DAILY PRN
Qty: 30 TABLET | Refills: 0 | Status: SHIPPED | OUTPATIENT
Start: 2017-12-12 | End: 2017-12-22

## 2017-12-12 RX ORDER — OXYBUTYNIN CHLORIDE 5 MG/1
5 TABLET ORAL 2 TIMES DAILY
Qty: 90 TABLET | Refills: 3 | Status: SHIPPED | OUTPATIENT
Start: 2017-12-12

## 2017-12-12 RX ADMIN — RIFAXIMIN 550 MG: 550 TABLET ORAL at 09:09

## 2017-12-12 RX ADMIN — HYDROCODONE BITARTRATE AND ACETAMINOPHEN 2 TABLET: 5; 325 TABLET ORAL at 14:49

## 2017-12-12 RX ADMIN — LINAGLIPTIN 5 MG: 5 TABLET, FILM COATED ORAL at 09:09

## 2017-12-12 RX ADMIN — ENOXAPARIN SODIUM 40 MG: 40 INJECTION SUBCUTANEOUS at 09:11

## 2017-12-12 RX ADMIN — DULOXETINE HYDROCHLORIDE 60 MG: 60 CAPSULE, DELAYED RELEASE ORAL at 09:09

## 2017-12-12 RX ADMIN — LEVOTHYROXINE SODIUM 150 MCG: 150 TABLET ORAL at 06:32

## 2017-12-12 RX ADMIN — HYDROCODONE BITARTRATE AND ACETAMINOPHEN 2 TABLET: 5; 325 TABLET ORAL at 02:51

## 2017-12-12 RX ADMIN — PREGABALIN 150 MG: 75 CAPSULE ORAL at 10:30

## 2017-12-12 RX ADMIN — TAMSULOSIN HYDROCHLORIDE 0.4 MG: 0.4 CAPSULE ORAL at 09:09

## 2017-12-12 RX ADMIN — MICONAZOLE NITRATE: 20.6 POWDER TOPICAL at 09:11

## 2017-12-12 RX ADMIN — BUMETANIDE 2 MG: 1 TABLET ORAL at 09:09

## 2017-12-12 RX ADMIN — FERROUS SULFATE TAB EC 325 MG (65 MG FE EQUIVALENT) 325 MG: 325 (65 FE) TABLET DELAYED RESPONSE at 09:09

## 2017-12-12 RX ADMIN — INSULIN LISPRO 1 UNITS: 100 INJECTION, SOLUTION INTRAVENOUS; SUBCUTANEOUS at 09:03

## 2017-12-12 RX ADMIN — INSULIN LISPRO 3 UNITS: 100 INJECTION, SOLUTION INTRAVENOUS; SUBCUTANEOUS at 12:22

## 2017-12-12 RX ADMIN — ASPIRIN 81 MG: 81 TABLET, COATED ORAL at 09:10

## 2017-12-12 RX ADMIN — VENLAFAXINE HYDROCHLORIDE 150 MG: 75 CAPSULE, EXTENDED RELEASE ORAL at 09:09

## 2017-12-12 RX ADMIN — Medication 10 ML: at 09:11

## 2017-12-12 RX ADMIN — MORPHINE SULFATE 2 MG: 2 INJECTION, SOLUTION INTRAMUSCULAR; INTRAVENOUS at 05:28

## 2017-12-12 RX ADMIN — LEVOFLOXACIN 750 MG: 750 TABLET, FILM COATED ORAL at 09:10

## 2017-12-12 RX ADMIN — MORPHINE SULFATE 2 MG: 2 INJECTION, SOLUTION INTRAMUSCULAR; INTRAVENOUS at 12:22

## 2017-12-12 RX ADMIN — HYDROCODONE BITARTRATE AND ACETAMINOPHEN 2 TABLET: 5; 325 TABLET ORAL at 10:30

## 2017-12-12 RX ADMIN — SPIRONOLACTONE 75 MG: 25 TABLET ORAL at 09:10

## 2017-12-12 RX ADMIN — CLONAZEPAM 1 MG: 0.5 TABLET ORAL at 10:33

## 2017-12-12 RX ADMIN — INSULIN GLARGINE 30 UNITS: 100 INJECTION, SOLUTION SUBCUTANEOUS at 10:26

## 2017-12-12 RX ADMIN — PANTOPRAZOLE SODIUM 40 MG: 40 TABLET, DELAYED RELEASE ORAL at 06:32

## 2017-12-12 RX ADMIN — OXYBUTYNIN CHLORIDE 5 MG: 5 TABLET ORAL at 09:09

## 2017-12-12 ASSESSMENT — PAIN SCALES - GENERAL
PAINLEVEL_OUTOF10: 8

## 2017-12-12 ASSESSMENT — PAIN DESCRIPTION - PAIN TYPE: TYPE: ACUTE PAIN

## 2017-12-12 ASSESSMENT — PAIN DESCRIPTION - FREQUENCY: FREQUENCY: CONTINUOUS

## 2017-12-12 ASSESSMENT — PAIN DESCRIPTION - LOCATION: LOCATION: SCROTUM

## 2017-12-12 NOTE — PROGRESS NOTES
home    GERD (gastroesophageal reflux disease)     H/O cardiac catheterization not sure of date    no stents    Hiatal hernia     Hyperlipidemia     not taking any medication    Hypertension     IBS (irritable bowel syndrome)     Neuropathy (HCC)     feet,toes    On home oxygen therapy     prn    Pancreatitis     x 5 episodes    Primary osteoarthritis of right knee     Thyroid disease      Past Surgical History:   Procedure Laterality Date    ABDOMEN SURGERY      hernia repair x4 (ventral)    COLONOSCOPY      2012    ENDOSCOPY, COLON, DIAGNOSTIC      HERNIA REPAIR      RI DEEP DISSEC FOOT INFEC,1 BURSA Bilateral 8/22/2017    FOOT DEBRIDEMENT INCISION AND DRAINAGE with bone bx performed by Caterina Giles DPM at [address] [place] [address] EGD TRANSORAL BIOPSY SINGLE/MULTIPLE N/A 7/19/2017    EGD BIOPSY performed by Cristobal Paniagua MD at [facility]  07/19/2017    polypectomy     Restrictions  Restrictions/Precautions  Restrictions/Precautions: General Precautions, Fall Risk  Required Braces or Orthoses?: No  Position Activity Restriction  Other position/activity restrictions: O2 @ 2L per nc  Subjective   General  Chart Reviewed: Yes  Response To Previous Treatment: Patient reporting fatigue but able to participate.   Family / Caregiver Present: No  Pain Screening  Patient Currently in Pain: Yes  Pain Assessment  Pain Level: 8  Pain Type: Acute pain  Pain Location: Scrotum  Pain Frequency: Continuous  Vital Signs  Patient Currently in Pain: Yes       Orientation  Orientation  Overall Orientation Status: Within Normal Limits  Objective   Bed mobility  Rolling to Left: Modified independent  Rolling to Right: Modified independent  Supine to Sit: Maximum assistance  Sit to Supine: Maximum assistance  Transfers  Sit to Stand: Modified independent  Stand to sit: Modified independent  Bed to Chair: Modified independent  Stand Pivot Transfers: Modified independent  Ambulation  Ambulation?: Endurance Training, Gait Training, Stair training, Home Exercise Program  Plan Comment: [facility] [employer]  Safety Devices  Type of devices: Left in bed, Call light within reach  Restraints  Initially in place: No     Therapy Time   Individual   Time In 1129   Time Out 1159   Minutes 30      BEKA HERNANDEZ, PT

## 2017-12-12 NOTE — PLAN OF CARE
Problem: Pain:  Goal: Pain level will decrease  Pain level will decrease   Outcome: Ongoing  Patient has experienced pain throughout the day to right knee, feet and scrotal area. Worsened while retaining urine, now improved. Maintaining relief by oral Norco and prn Morphine for breakthrough pain.

## 2017-12-12 NOTE — PROGRESS NOTES
Debra Barefoot                                                                                  Urology Progress Note            Subjective: urinary retention, Scrotal pain and swelling    Patient Vitals for the past 24 hrs:   BP Temp Temp src Pulse Resp SpO2 Weight   12/11/17 1545 (!) 118/48 98.2 °F (36.8 °C) Oral 76 22 95 % -   12/11/17 1229 (!) 93/56 97.9 °F (36.6 °C) Oral 111 22 92 % -   12/11/17 0756 - - - - - - (!) 414 lb 7.4 oz (188 kg)   12/11/17 0638 (!) 99/54 97.3 °F (36.3 °C) Oral 62 20 94 % -   12/10/17 2015 (!) 101/45 98.4 °F (36.9 °C) Oral 66 20 97 % -       Intake/Output Summary (Last 24 hours) at 12/11/17 1939  Last data filed at 12/11/17 1720   Gross per 24 hour   Intake                0 ml   Output             5200 ml   Net            -5200 ml       No results for input(s): WBC, HGB, HCT, MCV, PLT in the last 72 hours. No results for input(s): NA, K, CL, CO2, PHOS, BUN, CREATININE in the last 72 hours. Invalid input(s): CA    No results for input(s): COLORU, PHUR, LABCAST, WBCUA, RBCUA, MUCUS, TRICHOMONAS, YEAST, BACTERIA, CLARITYU, SPECGRAV, LEUKOCYTESUR, UROBILINOGEN, Kasandra Ehrich in the last 72 hours. Invalid input(s): NITRATE, GLUCOSEUKETONESUAMORPHOUS    Additional Lab/culture results:    Physical Exam: Patient in bed, alert oriented,, catheter in place,marked scrotal and leg swelling.     Discussed with the patient catheter ,my concern is not he will be a very difficult catheter insertion and removal    Interval Imaging Findings:    Impression:    Patient Active Problem List   Diagnosis    Alcoholic cirrhosis of liver ([facility]    Peripheral edema    Bipolar disorder ([facility]    Diabetes mellitus with neuropathy ([facility]    Essential hypertension    Hyperlipidemia    Weakness    Hyponatremia    FABI (obstructive sleep apnea)    Primary osteoarthritis of right knee    Type 2 diabetes mellitus with diabetic neuropathy ([facility]    Hypothyroidism    Urine retention    Anemia    Cellulitis of right lower extremity    Gastroesophageal reflux disease    Slow transit constipation    Constipation    Iron deficiency anemia due to chronic blood loss    Gastroesophageal reflux disease without esophagitis    Secondary esophageal varices without bleeding (HCC)    Portal hypertension (HCC)    Morbid obesity with BMI of 50.0-59.9, adult (HCC)    Venous stasis dermatitis of both lower extremities    Cellulitis of left lower extremity    Cellulitis of perineum    Epididymoorchitis    Abdominal pain    Multiple and open wound of lower limb    Scrotal edema       Plan: continue to monitor  The scrotal swelling, Antibiotic therapy  Trial of voiding as outpatient    Cristobal Celeste  7:39 PM 12/11/2017

## 2017-12-12 NOTE — DISCHARGE SUMMARY
[facility]    Discharge Summary     Patient ID: Arturo Hollingsworth  :  1965   MRN: [id number]     ACCOUNT:  [de-identified]   Patient's PCP: Yuko Ramos MD  Admit Date: 2017   Discharge Date: 2017     Length of Stay: 5  Code Status:  Full Code  Admitting Physician: Kian Lane DO  Discharge Physician: Kian Lane DO     Active Discharge Diagnoses:     Primary Problem  64 ALEJANDRINA Rodriguez Problems    Diagnosis Date Noted    Peripheral edema [R60.9] 2014     Priority: High    Bipolar disorder ([facility] [F31.9] 2014     Priority: Medium    Diabetes mellitus with neuropathy ([facility] [E11.40] 2014     Priority: Medium    Essential hypertension [I10] 2014     Priority: Medium    Hyperlipidemia [E78.5] 2014     Priority: Medium    Weakness [R53.1] 2014     Priority: Medium    Scrotal edema [N50.89] 2017    Morbid obesity with BMI of 50.0-59.9, adult ([facility] [E66.01, Z68.43] 2017    Urine retention [R33.9] 2017    Epididymoorchitis [N45.3]     Abdominal pain [R10.9]     Multiple and open wound of lower limb [S81.809A]     Cellulitis of perineum [L03.315] 2017    Venous stasis dermatitis of both lower extremities [I87.2] 2017    Hypothyroidism [E03.9]        Admission Condition:  fair     Discharged Condition: stable    Hospital Stay:     Hospital Course:  Arturo Hollingsworth is a 47 y.o. male who was admitted for the management of   Epididymoorchitis , presented to ER with Chest Pain; Groin Swelling; Leg Swelling; Shortness of Breath; and Urinary Retention    The patient is a 47 y. o.  Non-/non  male who presents with Chest Pain; Groin Swelling; Leg Swelling;  Shortness of Breath; and Urinary Retention   and he is admitted to the hospital for the management of  Swelling in groin, along with inability to urinate.  The swelling inflammation/infection. Asymmetrically increased flow to the right testis and epididymis, consistent with epididymo-orchitis as well as moderate-sized complex right hydrocele. There is a small benign-appearing left epididymal cyst.     Ct Abdomen Pelvis W Iv Contrast Additional Contrast? None    Result Date: 12/7/2017  EXAMINATION: CT OF THE ABDOMEN AND PELVIS WITH CONTRAST 12/7/2017 6:37 pm TECHNIQUE: CT of the abdomen and pelvis was performed with the administration of intravenous contrast. Multiplanar reformatted images are provided for review. Dose modulation, iterative reconstruction, and/or weight based adjustment of the mA/kV was utilized to reduce the radiation dose to as low as reasonably achievable. COMPARISON: 06/18/2014 HISTORY: ORDERING SYSTEM PROVIDED HISTORY: Lower abdominal pain TECHNOLOGIST PROVIDED HISTORY: Additional Contrast?->None FINDINGS: Cardiovascular: Unremarkable Lower Chest: Unremarkable Organs Liver:  Unremarkable Gallbladder:  Unremarkable Biliary: Unremarkable Pancreas: Unremarkable Spleen: Unremarkable Adrenals:  Unremarkable Kidneys: Unremarkable Pelvis Bladder: Clemons catheter, under distended. Reproductive: Unremarkable. GI/ Bowel: No wall thickening, pericolonic inflammation or diverticular disease. Unremarkable appendix. Stomach and proximal small bowel appear unremarkable. Peritoneum/ Retroperitoneum: No adenopathy, free air or free fluid Bones/ Soft Tissues: Status post ventral hernia repair. Small fat containing parasagittal hernia seen just right of the midline at the level of the iliac crest.  Another tiny left parasagittal hernia is seen almost at the same level. Neither hernia contains any loops of bowel. Above these findings were present on the June 2014 evaluation. Re- demonstrated mild compression of the T10 vertebral body. 1. Nonspecific, mild inflammatory change in the pelvis in the area of the posterior cul-de-sac is identified.   There are no abnormal fluid INFECTIOUS DISEASES  IP CONSULT TO INFECTIOUS DISEASES  IP CONSULT TO UROLOGY  IP CONSULT TO SOCIAL WORK      The patient was seen and examined on day of discharge and this discharge summary is in conjunction with any daily progress note from day of discharge. Discharge plan:     Disposition: Home with [facility]    Physician Follow Up:   MD Wenceslao De Leon. [place] [address]  [phone]             Requiring Further Evaluation/Follow Up POST HOSPITALIZATION/Incidental Findings: Catheter management as an outpatient    Diet: diabetic diet    Activity: As tolerated    Instructions to Patient: Please antibiotics as ordered    Discharge Medications:      Medication List      START taking these medications    hydrOXYzine 25 MG tablet  Commonly known as:  ATARAX  Take 1 tablet by mouth 3 times daily as needed for Itching     levofloxacin 750 MG tablet  Commonly known as:  LEVAQUIN  Take 1 tablet by mouth daily for 10 days  Start taking on:  12/13/2017     oxybutynin 5 MG tablet  Commonly known as:  DITROPAN  Take 1 tablet by mouth 2 times daily        CHANGE how you take these medications    BUMEX 2 MG tablet  Generic drug:  bumetanide  What changed:  Another medication with the same name was removed. Continue taking this medication, and follow the directions you see here. ferrous sulfate 325 (65 Fe) MG tablet  What changed:  Another medication with the same name was removed. Continue taking this medication, and follow the directions you see here. insulin glargine 100 UNIT/ML injection vial  Commonly known as:  LANTUS  What changed:  Another medication with the same name was removed. Continue taking this medication, and follow the directions you see here.         CONTINUE taking these medications    aspirin 81 MG EC tablet  Take 1 tablet by mouth daily     clonazePAM 1 MG tablet  Commonly known as:  KLONOPIN     DULoxetine 60 MG extended release capsule  Commonly known as: CYMBALTA     esomeprazole 40 MG delayed release capsule  Commonly known as:  NEXIUM     ibuprofen 600 MG tablet  Commonly known as:  ADVIL;MOTRIN     levothyroxine 150 MCG tablet  Commonly known as:  SYNTHROID     metFORMIN 1000 MG tablet  Commonly known as:  GLUCOPHAGE     nadolol 40 MG tablet  Commonly known as:  CORGARD     nitroGLYCERIN 0.4 MG SL tablet  Commonly known as:  NITROSTAT  Place 1 tablet under the tongue every 5 minutes as needed for Chest pain. nystatin 015310 UNIT/GM powder  Commonly known as:  MYCOSTATIN     oxyCODONE-acetaminophen  MG per tablet  Commonly known as:  PERCOCET     pregabalin 150 MG capsule  Commonly known as:  LYRICA     QUEtiapine 150 MG Tb24 extended release tablet  Commonly known as:  SEROQUEL XR     SITagliptin 100 MG tablet  Commonly known as:  JANUVIA  Take 1 tablet by mouth daily     spironolactone 25 MG tablet  Commonly known as:  ALDACTONE     tamsulosin 0.4 MG capsule  Commonly known as:  FLOMAX     venlafaxine 150 MG extended release capsule  Commonly known as:  EFFEXOR XR     XIFAXAN 550 MG tablet  Generic drug:  rifaximin           Where to Get Your Medications      These medications were sent to [facility], [address] [place] [address], [address] [place] [address]    Phone:  [phone]   · hydrOXYzine 25 MG tablet  · levofloxacin 750 MG tablet  · oxybutynin 5 MG tablet         Time Spent on discharge is  24 minutes in patient examination, evaluation, counseling as well as medication reconciliation, prescriptions for required medications, discharge plan and follow up. Electronically signed by   Galileo Blue DO  12/12/2017  11:05 AM      Thank you Dr. Kofi Short MD for the opportunity to be involved in this patient's care.

## 2017-12-12 NOTE — FLOWSHEET NOTE
Patient sitting up in bed finishing lunch; states he expects to be discharged later today; expresses no spiritual/emotional needs at this time; thanks  for visiting. Spiritual Care will follow as needed.      12/12/17 3382   Encounter Summary   Services provided to: Patient   Referral/Consult From: Ramy   Continue Visiting (12/12/17)   Complexity of Encounter Low   Length of Encounter 15 minutes   Routine   Type Follow up   Assessment Approachable   Intervention Active listening   Outcome Expressed gratitude

## 2017-12-12 NOTE — PROGRESS NOTES
Daily    pantoprazole  40 mg Oral QAM AC    ferrous sulfate  325 mg Oral BID    insulin glargine  30 Units Subcutaneous Daily    levothyroxine  150 mcg Oral Daily    nadolol  40 mg Oral Daily    miconazole   Topical BID    pregabalin  150 mg Oral BID    QUEtiapine  150 mg Oral Nightly    rifaximin  550 mg Oral BID    linagliptin  5 mg Oral Daily    tamsulosin  0.4 mg Oral Daily    venlafaxine  150 mg Oral Daily    sodium chloride flush  10 mL Intravenous 2 times per day    enoxaparin  40 mg Subcutaneous BID    insulin lispro  0-6 Units Subcutaneous TID WC    insulin lispro  0-3 Units Subcutaneous Nightly    spironolactone  75 mg Oral BID     Continuous Infusions:    dextrose       PRN Meds: lidocaine, morphine **OR** morphine, hydrOXYzine, clonazePAM, sodium chloride flush, potassium chloride **OR** potassium chloride **OR** potassium chloride, magnesium sulfate, acetaminophen, HYDROcodone 5 mg - acetaminophen **OR** HYDROcodone 5 mg - acetaminophen, magnesium hydroxide, bisacodyl, ondansetron, nicotine, glucose, dextrose, glucagon (rDNA), dextrose    Data:     Past Medical History:   has a past medical history of Anxiety and depression; Back pain, chronic; BPH (benign prostatic hyperplasia); Cirrhosis ([facility] Diabetes mellitus ([facility] GERD (gastroesophageal reflux disease); H/O cardiac catheterization; Hiatal hernia; Hyperlipidemia; Hypertension; IBS (irritable bowel syndrome); Neuropathy ([facility] On home oxygen therapy; Pancreatitis; Primary osteoarthritis of right knee; and Thyroid disease. Social History:   reports that he has never smoked. He has never used smokeless tobacco. He reports that he does not drink alcohol or use drugs.      Family History:   Family History   Problem Relation Age of Onset    Adopted: Yes       Vitals:  BP (!) 101/53   Pulse 70   Temp 97.7 °F (36.5 °C) (Oral)   Resp 20   Ht 5' 11\" (1.803 m)   Wt (!) 414 lb 14.4 oz (188.2 kg)   SpO2 94%   BMI 57.87 kg/m²   Temp (24hrs), Av.8 °F (36.6 °C), Min:97.2 °F (36.2 °C), Max:98.2 °F (36.8 °C)    Recent Labs      17   1212  17   1618  17   2130  17   0705   POCGLU  208*  307*  272*  161*       I/O (24Hr): Intake/Output Summary (Last 24 hours) at 17 0829  Last data filed at 17 0537   Gross per 24 hour   Intake              750 ml   Output             4000 ml   Net            -3250 ml       Labs:    Hematology:No results for input(s): WBC, RBC, HGB, HCT, MCV, MCH, MCHC, RDW, PLT, MPV, SEDRATE, CRP, INR, DDIMER, IA9AMEVO, LABABSO in the last 72 hours. Invalid input(s): PT  Chemistry:No results for input(s): NA, K, CL, CO2, GLUCOSE, BUN, CREATININE, MG, ANIONGAP, LABGLOM, GFRAA, CALCIUM, CAION, PHOS, PSA, PROBNP, TROPONINI, TROPONINT, CKTOTAL, CKMB, CKMBINDEX, MYOGLOBIN, DIGOXIN, LACTACIDWB in the last 72 hours.   Recent Labs      12/10/17   1658  17   0725  17   1212  17   1618  17   2130  17   0705   POCGLU  199*  173*  208*  307*  272*  161*         Lab Results   Component Value Date/Time    SPECIAL NOT REPORTED 2017 06:47 PM     Lab Results   Component Value Date/Time    CULTURE NO GROWTH 5 DAYS 2017 06:47 PM    CULTURE  2017 06:47 PM     Performed at [address] [place] [address] [phone]       Lab Results   Component Value Date    PHART 7.330 2014    JNX7YCC 68.0 2014    PO2ART 84.0 2014    EZP7HHS 34.9 2014    NBEA NOT REPORTED 2014    PBEA 7.4 2014    Q1WRQJPB 96.5 2014    FIO2 CANNULA 2014       Radiology:    No new radiology reports    Physical Examination:        General appearance:  alert, cooperative and no distress  Mental Status:  oriented to person, place and time and normal affect  Lungs:  clear to auscultation bilaterally, normal effort  Heart:  regular rate and rhythm, no murmur  Abdomen:  soft, nontender, nondistended, normal bowel sounds, no masses,

## 2017-12-12 NOTE — PROGRESS NOTES
[facility]  Progress Note    Arturo Hollingsworth  MRN: [id number]  Date: 12/12/2017    Reason for F/U :   Cellulitis, epididymoorchitis    Impression :   · Alcoholic cirrhosis with ascites. · Right-sided epididymoorchitis associated with a complex right-sided hydrocele Secondary to E. coli  · Bilateral lower extremity venous stasis dermatitis  · Bilateral plantar foot wounds in the heel area as well as the great toe on the left. ? None of these appear infected. · Morbid obesity  · Diabetes mellitus type II    Recommendations:   · Continue antimicrobial therapy with levofloxacin orally through 12/18/17  · The plan is for discharge today with the Clemons catheter in place  · The patient can follow-up with urology and if we can be of any further assistance and patient can follow-up with us on an as-needed basis  · Call with questions for the patient were answered. The care was discussed with the nurse. · Continue to follow-up with podiatry for the foot wounds. Summary of Stay:   Arturo Hollingsworth is a 47y.o.-year-old male who was initially admitted on 12/7/2017. [facility] has a history of alcoholic cirrhosis, morbid obesity, significant bilateral lower extremity edema. He came into the emergency complaining of some chest pains with associated shortness of breath. He also reported some abdominal pain and scrotal swelling with urinary retention. The patient is known to me from the past for bouts of cellulitis and he is followed at the wound care center reports that he had several ulcers debrided at the wound care center. He currently gets Ace wraps to the lower extremities bilaterally done by visiting nurse services 3 times a week. He does not report any fevers or chills.  He was evaluated in the emergency room and admitted for lower extremity cellulitis    Current evaluation:12/12/2017    BP 92/74   Pulse 73   Temp 98.6 °F (37 °C) (Axillary)   Resp 20   Ht 5' 11\" (1.803 m)   Wt (!) 414 lb 14.4 oz (188.2 kg)   SpO2 99%   BMI 57.87 kg/m²     Temperature Range: Temp: 98.6 °F (37 °C) Temp  Av.9 °F (36.6 °C)  Min: 97.2 °F (36.2 °C)  Max: 98.6 °F (37 °C)  The patient is seen and evaluated at bedside he is awake and alert and does reports some scrotal pain which is about 6 out of 10 and he reports this is tolerable for him to go home. He does not report any fevers or chills. He was able to ambulate in the halls  The patient does have a Clemons catheter in place which has reduced his urinary symptoms. Physical Examination :     Physical Exam   Constitutional: He is oriented to person, place, and time. He appears well-developed. He appears distressed. Cardiovascular: Regular rhythm and normal heart sounds. Pulmonary/Chest: Effort normal and breath sounds normal.   Abdominal: Soft. Bowel sounds are normal.   Genitourinary:   Genitourinary Comments: The right-sided testicular swelling and scrotal edema is slightly improved. The area remains tender to palpation. The patient is a Clemons catheter in place. Musculoskeletal: Normal range of motion. Neurological: He is alert and oriented to person, place, and time. Skin:   Bilateral lower extremity Ace wraps in place       Laboratory data:   I have independently reviewed the following labs:  CBC with Differential:   No results for input(s): WBC, HGB, HCT, PLT, SEGSPCT, BANDSPCT, LYMPHOPCT, MONOPCT, EOSPCT in the last 72 hours. BMP:   No results for input(s): NA, K, CL, CO2, BUN, CREATININE, MG in the last 72 hours. Invalid input(s): CA  Hepatic Function Panel:   No results for input(s): PROT, LABALBU, BILIDIR, IBILI, BILITOT, ALKPHOS, ALT, AST in the last 72 hours. No results for input(s): VANCOTROUGH in the last 72 hours.    Lab Results   Component Value Date    CRP 74.3 (H) 2017     Lab Results   Component Value Date    SEDRATE 83 (H) 2017       Imaging Studies:   ULTRASOUND OF THE SCROTUM/TESTICLES WITH COLOR DOPPLER FLOW EVALUATION

## 2017-12-13 LAB
CULTURE: NORMAL
Lab: NORMAL
Lab: NORMAL
SPECIMEN DESCRIPTION: NORMAL
STATUS: NORMAL
STATUS: NORMAL

## 2018-01-22 ENCOUNTER — HOSPITAL ENCOUNTER (OUTPATIENT)
Dept: PREADMISSION TESTING | Age: 53
Discharge: HOME | End: 2018-01-22
Payer: MEDICARE

## 2018-01-22 VITALS
RESPIRATION RATE: 18 BRPM | HEIGHT: 71 IN | BODY MASS INDEX: 44.1 KG/M2 | SYSTOLIC BLOOD PRESSURE: 116 MMHG | DIASTOLIC BLOOD PRESSURE: 69 MMHG | HEART RATE: 56 BPM | WEIGHT: 315 LBS | OXYGEN SATURATION: 100 %

## 2018-01-22 LAB
ALBUMIN SERPL-MCNC: 3.8 G/DL (ref 3.5–5.2)
ALBUMIN/GLOBULIN RATIO: ABNORMAL (ref 1–2.5)
ALP BLD-CCNC: 186 U/L (ref 40–129)
ALT SERPL-CCNC: 22 U/L (ref 5–41)
ANION GAP SERPL CALCULATED.3IONS-SCNC: 12 MMOL/L (ref 9–17)
AST SERPL-CCNC: 17 U/L
BILIRUB SERPL-MCNC: 0.51 MG/DL (ref 0.3–1.2)
BILIRUBIN DIRECT: 0.16 MG/DL
BILIRUBIN, INDIRECT: 0.35 MG/DL (ref 0–1)
BUN BLDV-MCNC: 16 MG/DL (ref 6–20)
BUN/CREAT BLD: 14 (ref 9–20)
CALCIUM SERPL-MCNC: 9.7 MG/DL (ref 8.6–10.4)
CHLORIDE BLD-SCNC: 82 MMOL/L (ref 98–107)
CO2: 29 MMOL/L (ref 20–31)
CREAT SERPL-MCNC: 1.13 MG/DL (ref 0.7–1.2)
GFR AFRICAN AMERICAN: >60 ML/MIN
GFR NON-AFRICAN AMERICAN: >60 ML/MIN
GFR SERPL CREATININE-BSD FRML MDRD: ABNORMAL ML/MIN/{1.73_M2}
GFR SERPL CREATININE-BSD FRML MDRD: ABNORMAL ML/MIN/{1.73_M2}
GLOBULIN: ABNORMAL G/DL (ref 1.5–3.8)
GLUCOSE BLD-MCNC: 162 MG/DL (ref 70–99)
HCT VFR BLD CALC: 43.8 % (ref 41–53)
HEMOGLOBIN: 14.1 G/DL (ref 13.5–17.5)
INR BLD: 0.9
MCH RBC QN AUTO: 28.4 PG (ref 26–34)
MCHC RBC AUTO-ENTMCNC: 32.2 G/DL (ref 31–37)
MCV RBC AUTO: 88.2 FL (ref 80–100)
NRBC AUTOMATED: NORMAL PER 100 WBC
PARTIAL THROMBOPLASTIN TIME: 29.2 SEC (ref 23–31)
PDW BLD-RTO: 14.2 % (ref 11.5–14.5)
PLATELET # BLD: 203 K/UL (ref 130–400)
PMV BLD AUTO: 8 FL (ref 6–12)
POTASSIUM SERPL-SCNC: 4.6 MMOL/L (ref 3.7–5.3)
PROTHROMBIN TIME: 9.8 SEC (ref 9.7–11.6)
RBC # BLD: 4.96 M/UL (ref 4.5–5.9)
SODIUM BLD-SCNC: 123 MMOL/L (ref 135–144)
TOTAL PROTEIN: 7.6 G/DL (ref 6.4–8.3)
WBC # BLD: 8.8 K/UL (ref 3.5–11)

## 2018-01-22 PROCEDURE — 85027 COMPLETE CBC AUTOMATED: CPT

## 2018-01-22 PROCEDURE — 80076 HEPATIC FUNCTION PANEL: CPT

## 2018-01-22 PROCEDURE — 85730 THROMBOPLASTIN TIME PARTIAL: CPT

## 2018-01-22 PROCEDURE — 80048 BASIC METABOLIC PNL TOTAL CA: CPT

## 2018-01-22 PROCEDURE — 85610 PROTHROMBIN TIME: CPT

## 2018-01-22 PROCEDURE — 36415 COLL VENOUS BLD VENIPUNCTURE: CPT

## 2018-01-22 RX ORDER — HYDROXYZINE HYDROCHLORIDE 25 MG/1
25 TABLET, FILM COATED ORAL 3 TIMES DAILY PRN
Status: ON HOLD | COMMUNITY
End: 2021-01-13

## 2018-01-22 NOTE — H&P
collapse  +PVD with leg swelling Wears compression wraps. No need for assistance with ambulation. Neuropsychiatric:  Denies  neuropathy, syncopal episodes, weakness, vertigo, arthralgia, myalgia or numbness or tingling. OBJECTIVE:   VITALS:  height is 5' 11\" (1.803 m) and weight is 353 lb (160.1 kg) (abnormal). His blood pressure is 116/69 and his pulse is 56. His respiration is 18 and oxygen saturation is 100%. Patient weight on 12/11/18 during hospitalization was 414# 7.4 oz   CONSTITUTIONAL:This is a 47 y.o.morbidly obese male who is cooperative, alert & orientated He is unable to stand and is sitting in a w/c fully dressed for exam   SKIN:  Warm and dry, no rashes Unable to completely visualize   HEAD:  Normocephalic, atraumatic Face symmetrical   EYES: PERRL. EOMs intact. Vision correction   EARS:  Hearing grossly WNL. NOSE:  Nares patent. No rhinorrhea   THROAT:  Airway is patent, membranes are mildly dry today  Poor dentition Multiple absent teeth   NECK:supple, no lymphadenopathy No carotid bruits  LUNGS: Diminished breath sounds  Equal expansion Unlabored respirations at rest w/o use of accessory muscles and generally clear to auscultation with scattered bibasilar crackles noted bilaterally. [facility] CARDIOVASCULAR: Heart sounds are normal.  HR 56 Bradycardic rate and regular rhythm without symptoms  No murmur, gallop or rub. GENITALIA:  Deferred to Dr Linda Bethea  Clemons catheter to bag draining light yellow clear urine   ABDOMEN: Obese w/panus Soft, non tender, non distended with bowel sounds present    EXTREMITIES:Compression ace wraps to knees with dressings to bilateral heels with bilateral peripheral edemaUnable to evaluate lower extremity pulses   NEURO: Cranial nerves II-XII grossly intact Strength 5+/5+     Testing:   EKG: Refer to [facility] 12/7/18    Lab Review:  Refer to [facility] for lab results 1/22/18    IMPRESSIONS:   1.  Urinary retention   2.  has a past medical history of Anxiety and depression;

## 2018-01-22 NOTE — PRE-PROCEDURE INSTRUCTIONS
[address] [place] [address] Wednesday, January 24  at 9:00 AM    Once you enter the hospital lobby, take the elevators to the second floor. Check-In is at the surgery registration desk. If you have been given a blood band, you must bring it with you the day of surgery. Continue to take your home medications as you normally do up to and including the night before surgery with the exception of any blood thinning medications. Please stop any blood thinning medications as directed by your surgeon or prescribing physician. Failure to stop certain medications may interfere with your scheduled surgery. These may include:  Aspirin, Warfarin (Coumadin), Clopidogrel (Plavix), Ibuprofen (Motrin, Advil), Naproxen (Aleve), Meloxicam (Mobic), Celecoxib (Celebrex), Eliquis, Pradaxa, Xarelto, Effient, Fish Oil, Herbal supplements. Already stopped aspirin and ibuprofen    If you are diabetic, do not take any of your diabetic medications by mouth the morning of surgery. If you are taking insulin contact the doctor that manages your diabetes for instructions about any changes to your insulin dosages the day before surgery. Do not inject insulin or other injectable diabetic medications the morning of surgery unless otherwise instructed by the doctor who manages your diabetes. Please take the following medication(s) the day of surgery with a small sip of water:  Nadolol, esomeprazole, oxycodone if needed, clonazepam if needed    Please use your inhalers at home the day of surgery      PREPARING FOR YOUR SURGERY:     Before surgery, you can play an important role in your own health. Because skin is not sterile, we need to be sure that your skin is as free of germs as possible before surgery by carefully washing before surgery. Preparing or prepping skin before surgery can reduce the risk of a surgical site infection.   Do not shave the area of your body where your surgery will be performed unless you received specific permission from your physician. Preoperative Bathing Instructions   Bathe or shower the day of surgery.  Wear clean clothing after bathing.  Shampoo hair before surgery   Keep nails clean    Do not apply alcohol-based hair or skin products,    Do not apply lotion, emollients, cosmetics, perfumes or deodorant the day of surgery.  Do not shave the area of your body where your surgery will be performed unless you receive specific permission from your surgeon. Patient Instructions:    Cory Domínguez If you are having any type of anesthesia you are to have nothing to eat or drink after midnight the night before your surgery. This includes gum, mints, water or smoking or chewing tobacco.  The only exception to this is a small sip of water to take with any morning dose of heart, blood pressure, or seizure medications. No alcoholic beverages for 24 hours prior to surgery.  Brush your teeth but do not swallow water.  Bring your eyeglasses and case with you. No contacts are to be worn the day of surgery. You also may bring your hearing aids.  If you are on C-PAP or Bi-PAP at home and plan on staying in the hospital overnight for your surgery please bring the machine with you.  Do not wear any jewelry or body piercings day of surgery. Also, NO lotion, perfume or deodorant to be used the day of surgery. No nail polish on the operative extremity (arm/leg surgeries)       If you are staying overnight with us, please bring a SMALL bag of personal items.  Please wear loose, comfortable clothing. If you are potentially going to have a cast or brace bring clothing that will fit over them.                                                                                                                            In case of illness - If you have cold or flu like symptoms (high fever, runny nose, sore throat, cough, etc.) rash, nausea, vomiting, loose stools, and/or recent contact with someone

## 2018-01-23 ENCOUNTER — ANESTHESIA EVENT (OUTPATIENT)
Dept: OPERATING ROOM | Age: 53
End: 2018-01-23
Payer: MEDICARE

## 2018-01-24 ENCOUNTER — ANESTHESIA (OUTPATIENT)
Dept: OPERATING ROOM | Age: 53
End: 2018-01-24
Payer: MEDICARE

## 2018-01-24 ENCOUNTER — HOSPITAL ENCOUNTER (OUTPATIENT)
Age: 53
Setting detail: OUTPATIENT SURGERY
Discharge: HOME | End: 2018-01-24
Attending: UROLOGY | Admitting: UROLOGY
Payer: MEDICARE

## 2018-01-24 VITALS
RESPIRATION RATE: 12 BRPM | WEIGHT: 315 LBS | HEIGHT: 71 IN | HEART RATE: 65 BPM | DIASTOLIC BLOOD PRESSURE: 77 MMHG | TEMPERATURE: 97.3 F | BODY MASS INDEX: 44.1 KG/M2 | SYSTOLIC BLOOD PRESSURE: 133 MMHG | OXYGEN SATURATION: 99 %

## 2018-01-24 VITALS — SYSTOLIC BLOOD PRESSURE: 103 MMHG | DIASTOLIC BLOOD PRESSURE: 51 MMHG | OXYGEN SATURATION: 96 %

## 2018-01-24 PROBLEM — R33.9 RETENTION, URINE: Status: ACTIVE | Noted: 2018-01-24

## 2018-01-24 LAB
-: ABNORMAL
AMORPHOUS: ABNORMAL
ANION GAP SERPL CALCULATED.3IONS-SCNC: 13 MMOL/L (ref 9–17)
BACTERIA: ABNORMAL
BILIRUBIN URINE: NEGATIVE
CASTS UA: ABNORMAL /LPF
CHLORIDE BLD-SCNC: 87 MMOL/L (ref 98–107)
CO2: 29 MMOL/L (ref 20–31)
COLOR: ABNORMAL
COMMENT UA: ABNORMAL
CRYSTALS, UA: ABNORMAL /HPF
EPITHELIAL CELLS UA: ABNORMAL /HPF (ref 0–5)
GLUCOSE BLD-MCNC: 125 MG/DL (ref 75–110)
GLUCOSE BLD-MCNC: 140 MG/DL (ref 75–110)
GLUCOSE URINE: NEGATIVE
KETONES, URINE: NEGATIVE
LEUKOCYTE ESTERASE, URINE: ABNORMAL
MUCUS: ABNORMAL
NITRITE, URINE: NEGATIVE
OTHER OBSERVATIONS UA: ABNORMAL
PH UA: 5.5 (ref 5–8)
POTASSIUM SERPL-SCNC: 4.9 MMOL/L (ref 3.7–5.3)
PROTEIN UA: NEGATIVE
RBC UA: ABNORMAL /HPF (ref 0–2)
RENAL EPITHELIAL, UA: ABNORMAL /HPF
SODIUM BLD-SCNC: 129 MMOL/L (ref 135–144)
SPECIFIC GRAVITY UA: 1.01 (ref 1–1.03)
TRICHOMONAS: ABNORMAL
TURBIDITY: CLEAR
URINE HGB: ABNORMAL
UROBILINOGEN, URINE: NORMAL
WBC UA: ABNORMAL /HPF (ref 0–5)
YEAST: ABNORMAL

## 2018-01-24 PROCEDURE — 2580000003 HC RX 258: Performed by: UROLOGY

## 2018-01-24 PROCEDURE — 7100000000 HC PACU RECOVERY - FIRST 15 MIN: Performed by: UROLOGY

## 2018-01-24 PROCEDURE — 6370000000 HC RX 637 (ALT 250 FOR IP): Performed by: UROLOGY

## 2018-01-24 PROCEDURE — 87086 URINE CULTURE/COLONY COUNT: CPT

## 2018-01-24 PROCEDURE — 6360000002 HC RX W HCPCS: Performed by: ANESTHESIOLOGY

## 2018-01-24 PROCEDURE — 7100000001 HC PACU RECOVERY - ADDTL 15 MIN: Performed by: UROLOGY

## 2018-01-24 PROCEDURE — 82947 ASSAY GLUCOSE BLOOD QUANT: CPT

## 2018-01-24 PROCEDURE — 2580000003 HC RX 258: Performed by: NURSE ANESTHETIST, CERTIFIED REGISTERED

## 2018-01-24 PROCEDURE — 6360000002 HC RX W HCPCS: Performed by: UROLOGY

## 2018-01-24 PROCEDURE — 3600000002 HC SURGERY LEVEL 2 BASE: Performed by: UROLOGY

## 2018-01-24 PROCEDURE — 3700000000 HC ANESTHESIA ATTENDED CARE: Performed by: UROLOGY

## 2018-01-24 PROCEDURE — 2500000003 HC RX 250 WO HCPCS: Performed by: NURSE ANESTHETIST, CERTIFIED REGISTERED

## 2018-01-24 PROCEDURE — 3700000001 HC ADD 15 MINUTES (ANESTHESIA): Performed by: UROLOGY

## 2018-01-24 PROCEDURE — 80051 ELECTROLYTE PANEL: CPT

## 2018-01-24 PROCEDURE — 81001 URINALYSIS AUTO W/SCOPE: CPT

## 2018-01-24 PROCEDURE — 6360000002 HC RX W HCPCS: Performed by: NURSE ANESTHETIST, CERTIFIED REGISTERED

## 2018-01-24 PROCEDURE — 3600000012 HC SURGERY LEVEL 2 ADDTL 15MIN: Performed by: UROLOGY

## 2018-01-24 RX ORDER — HYDRALAZINE HYDROCHLORIDE 20 MG/ML
5 INJECTION INTRAMUSCULAR; INTRAVENOUS EVERY 10 MIN PRN
Status: DISCONTINUED | OUTPATIENT
Start: 2018-01-24 | End: 2018-01-24 | Stop reason: HOSPADM

## 2018-01-24 RX ORDER — FENTANYL CITRATE 50 UG/ML
25 INJECTION, SOLUTION INTRAMUSCULAR; INTRAVENOUS EVERY 5 MIN PRN
Status: DISCONTINUED | OUTPATIENT
Start: 2018-01-24 | End: 2018-01-24 | Stop reason: HOSPADM

## 2018-01-24 RX ORDER — ONDANSETRON 2 MG/ML
INJECTION INTRAMUSCULAR; INTRAVENOUS PRN
Status: DISCONTINUED | OUTPATIENT
Start: 2018-01-24 | End: 2018-01-24 | Stop reason: SDUPTHER

## 2018-01-24 RX ORDER — SODIUM CHLORIDE 9 MG/ML
INJECTION, SOLUTION INTRAVENOUS CONTINUOUS
Status: DISCONTINUED | OUTPATIENT
Start: 2018-01-25 | End: 2018-01-24

## 2018-01-24 RX ORDER — DEXAMETHASONE SODIUM PHOSPHATE 10 MG/ML
INJECTION INTRAMUSCULAR; INTRAVENOUS PRN
Status: DISCONTINUED | OUTPATIENT
Start: 2018-01-24 | End: 2018-01-24 | Stop reason: SDUPTHER

## 2018-01-24 RX ORDER — PROPOFOL 10 MG/ML
INJECTION, EMULSION INTRAVENOUS PRN
Status: DISCONTINUED | OUTPATIENT
Start: 2018-01-24 | End: 2018-01-24 | Stop reason: SDUPTHER

## 2018-01-24 RX ORDER — SODIUM CHLORIDE, SODIUM LACTATE, POTASSIUM CHLORIDE, CALCIUM CHLORIDE 600; 310; 30; 20 MG/100ML; MG/100ML; MG/100ML; MG/100ML
INJECTION, SOLUTION INTRAVENOUS CONTINUOUS
Status: DISCONTINUED | OUTPATIENT
Start: 2018-01-25 | End: 2018-01-24 | Stop reason: HOSPADM

## 2018-01-24 RX ORDER — LABETALOL HYDROCHLORIDE 5 MG/ML
5 INJECTION, SOLUTION INTRAVENOUS EVERY 10 MIN PRN
Status: DISCONTINUED | OUTPATIENT
Start: 2018-01-24 | End: 2018-01-24 | Stop reason: HOSPADM

## 2018-01-24 RX ORDER — TAMSULOSIN HYDROCHLORIDE 0.4 MG/1
0.4 CAPSULE ORAL DAILY
Qty: 30 CAPSULE | Refills: 1 | Status: SHIPPED | OUTPATIENT
Start: 2018-01-24

## 2018-01-24 RX ORDER — SUCCINYLCHOLINE CHLORIDE 20 MG/ML
INJECTION INTRAMUSCULAR; INTRAVENOUS PRN
Status: DISCONTINUED | OUTPATIENT
Start: 2018-01-24 | End: 2018-01-24 | Stop reason: SDUPTHER

## 2018-01-24 RX ORDER — SODIUM CHLORIDE 0.9 % (FLUSH) 0.9 %
10 SYRINGE (ML) INJECTION PRN
Status: DISCONTINUED | OUTPATIENT
Start: 2018-01-24 | End: 2018-01-24 | Stop reason: HOSPADM

## 2018-01-24 RX ORDER — LIDOCAINE HYDROCHLORIDE 20 MG/ML
INJECTION, SOLUTION EPIDURAL; INFILTRATION; INTRACAUDAL; PERINEURAL PRN
Status: DISCONTINUED | OUTPATIENT
Start: 2018-01-24 | End: 2018-01-24 | Stop reason: SDUPTHER

## 2018-01-24 RX ORDER — LIDOCAINE HYDROCHLORIDE 10 MG/ML
1 INJECTION, SOLUTION EPIDURAL; INFILTRATION; INTRACAUDAL; PERINEURAL
Status: DISCONTINUED | OUTPATIENT
Start: 2018-01-25 | End: 2018-01-24 | Stop reason: HOSPADM

## 2018-01-24 RX ORDER — SODIUM CHLORIDE 0.9 % (FLUSH) 0.9 %
10 SYRINGE (ML) INJECTION EVERY 12 HOURS SCHEDULED
Status: DISCONTINUED | OUTPATIENT
Start: 2018-01-24 | End: 2018-01-24 | Stop reason: HOSPADM

## 2018-01-24 RX ORDER — SODIUM CHLORIDE, SODIUM LACTATE, POTASSIUM CHLORIDE, CALCIUM CHLORIDE 600; 310; 30; 20 MG/100ML; MG/100ML; MG/100ML; MG/100ML
INJECTION, SOLUTION INTRAVENOUS CONTINUOUS PRN
Status: DISCONTINUED | OUTPATIENT
Start: 2018-01-24 | End: 2018-01-24 | Stop reason: SDUPTHER

## 2018-01-24 RX ORDER — MIDAZOLAM HYDROCHLORIDE 1 MG/ML
INJECTION INTRAMUSCULAR; INTRAVENOUS PRN
Status: DISCONTINUED | OUTPATIENT
Start: 2018-01-24 | End: 2018-01-24 | Stop reason: SDUPTHER

## 2018-01-24 RX ORDER — ONDANSETRON 2 MG/ML
4 INJECTION INTRAMUSCULAR; INTRAVENOUS
Status: DISCONTINUED | OUTPATIENT
Start: 2018-01-24 | End: 2018-01-24 | Stop reason: HOSPADM

## 2018-01-24 RX ORDER — DEXAMETHASONE SODIUM PHOSPHATE 10 MG/ML
4 INJECTION INTRAMUSCULAR; INTRAVENOUS
Status: DISCONTINUED | OUTPATIENT
Start: 2018-01-24 | End: 2018-01-24 | Stop reason: HOSPADM

## 2018-01-24 RX ORDER — HYDROMORPHONE HCL 110MG/55ML
0.5 PATIENT CONTROLLED ANALGESIA SYRINGE INTRAVENOUS EVERY 5 MIN PRN
Status: DISCONTINUED | OUTPATIENT
Start: 2018-01-24 | End: 2018-01-24 | Stop reason: HOSPADM

## 2018-01-24 RX ORDER — FENTANYL CITRATE 50 UG/ML
INJECTION, SOLUTION INTRAMUSCULAR; INTRAVENOUS PRN
Status: DISCONTINUED | OUTPATIENT
Start: 2018-01-24 | End: 2018-01-24 | Stop reason: SDUPTHER

## 2018-01-24 RX ORDER — MEPERIDINE HYDROCHLORIDE 25 MG/ML
12.5 INJECTION INTRAMUSCULAR; INTRAVENOUS; SUBCUTANEOUS EVERY 5 MIN PRN
Status: DISCONTINUED | OUTPATIENT
Start: 2018-01-24 | End: 2018-01-24 | Stop reason: HOSPADM

## 2018-01-24 RX ORDER — CEPHALEXIN 500 MG/1
500 CAPSULE ORAL 3 TIMES DAILY
Qty: 15 CAPSULE | Refills: 0 | Status: SHIPPED | OUTPATIENT
Start: 2018-01-24 | End: 2018-01-29

## 2018-01-24 RX ORDER — DIPHENHYDRAMINE HYDROCHLORIDE 50 MG/ML
12.5 INJECTION INTRAMUSCULAR; INTRAVENOUS
Status: DISCONTINUED | OUTPATIENT
Start: 2018-01-24 | End: 2018-01-24 | Stop reason: HOSPADM

## 2018-01-24 RX ADMIN — DEXAMETHASONE SODIUM PHOSPHATE 10 MG: 10 INJECTION INTRAMUSCULAR; INTRAVENOUS at 12:14

## 2018-01-24 RX ADMIN — PROPOFOL 200 MG: 10 INJECTION, EMULSION INTRAVENOUS at 11:49

## 2018-01-24 RX ADMIN — SODIUM CHLORIDE, POTASSIUM CHLORIDE, SODIUM LACTATE AND CALCIUM CHLORIDE: 600; 310; 30; 20 INJECTION, SOLUTION INTRAVENOUS at 11:41

## 2018-01-24 RX ADMIN — Medication 100 MG: at 11:56

## 2018-01-24 RX ADMIN — CEFAZOLIN SODIUM 3 G: 1 INJECTION, POWDER, FOR SOLUTION INTRAMUSCULAR; INTRAVENOUS at 12:03

## 2018-01-24 RX ADMIN — SODIUM CHLORIDE, POTASSIUM CHLORIDE, SODIUM LACTATE AND CALCIUM CHLORIDE: 600; 310; 30; 20 INJECTION, SOLUTION INTRAVENOUS at 12:24

## 2018-01-24 RX ADMIN — ONDANSETRON 4 MG: 2 INJECTION, SOLUTION INTRAMUSCULAR; INTRAVENOUS at 12:14

## 2018-01-24 RX ADMIN — LIDOCAINE HYDROCHLORIDE 100 MG: 20 INJECTION, SOLUTION EPIDURAL; INFILTRATION; INTRACAUDAL; PERINEURAL at 11:49

## 2018-01-24 RX ADMIN — FENTANYL CITRATE 100 MCG: 50 INJECTION, SOLUTION INTRAMUSCULAR; INTRAVENOUS at 11:49

## 2018-01-24 RX ADMIN — FENTANYL CITRATE 25 MCG: 50 INJECTION, SOLUTION INTRAMUSCULAR; INTRAVENOUS at 12:38

## 2018-01-24 RX ADMIN — MIDAZOLAM HYDROCHLORIDE 2 MG: 1 INJECTION, SOLUTION INTRAMUSCULAR; INTRAVENOUS at 11:41

## 2018-01-24 ASSESSMENT — PULMONARY FUNCTION TESTS
PIF_VALUE: 19
PIF_VALUE: 1
PIF_VALUE: 17
PIF_VALUE: 20
PIF_VALUE: 1
PIF_VALUE: 21
PIF_VALUE: 42
PIF_VALUE: 27
PIF_VALUE: 28
PIF_VALUE: 34
PIF_VALUE: 23
PIF_VALUE: 1
PIF_VALUE: 25
PIF_VALUE: 28
PIF_VALUE: 24
PIF_VALUE: 28
PIF_VALUE: 28
PIF_VALUE: 29
PIF_VALUE: 9
PIF_VALUE: 24
PIF_VALUE: 10
PIF_VALUE: 1
PIF_VALUE: 1
PIF_VALUE: 41
PIF_VALUE: 25
PIF_VALUE: 20
PIF_VALUE: 34
PIF_VALUE: 1
PIF_VALUE: 22
PIF_VALUE: 1
PIF_VALUE: 10
PIF_VALUE: 30
PIF_VALUE: 24
PIF_VALUE: 20
PIF_VALUE: 1
PIF_VALUE: 1
PIF_VALUE: 7
PIF_VALUE: 28
PIF_VALUE: 26
PIF_VALUE: 28

## 2018-01-24 ASSESSMENT — PAIN SCALES - GENERAL
PAINLEVEL_OUTOF10: 0
PAINLEVEL_OUTOF10: 3
PAINLEVEL_OUTOF10: 0
PAINLEVEL_OUTOF10: 5
PAINLEVEL_OUTOF10: 5

## 2018-01-24 ASSESSMENT — PAIN DESCRIPTION - PAIN TYPE
TYPE: SURGICAL PAIN
TYPE_2: CHRONIC PAIN

## 2018-01-24 ASSESSMENT — PAIN DESCRIPTION - INTENSITY
RATING_2: 8
RATING_2: 7

## 2018-01-24 ASSESSMENT — PAIN DESCRIPTION - LOCATION
LOCATION_2: FOOT
LOCATION: PENIS

## 2018-01-24 ASSESSMENT — PAIN DESCRIPTION - ORIENTATION: ORIENTATION_2: RIGHT;LEFT

## 2018-01-24 NOTE — H&P
History and Physical Update    Pt Name: Alessio Castellanos  MRN: [id number]  YOB: 1965  Date of evaluation: 1/24/2018      [x] I have reviewed the H&P by Lu Angelo NP on 1/22/18 which meets the criteria for an Interval History and Physical note and is attached below. [x] I have examined  Alessio Castellanos  There are no changes to the plans for a cystoscopy The patient denies health changes, fever, chills, productive cough, SOB, or chest pain. Vital signs: BP (!) 145/62   Pulse 59   Resp 18   Ht 5' 11\" (1.803 m)   Wt (!) 353 lb (160.1 kg)   SpO2 100%   BMI 49.23 kg/m²     Allergies:  No known allergies    Medications:    Prior to Admission medications    Medication Sig Start Date End Date Taking? Authorizing Provider   hydrOXYzine (ATARAX) 25 MG tablet Take 25 mg by mouth 3 times daily as needed for Itching   Yes Historical Provider, MD   bumetanide (BUMEX) 2 MG tablet Take 2 mg by mouth 2 times daily   Yes Historical Provider, MD   insulin glargine (LANTUS) 100 UNIT/ML injection vial Inject 30 Units into the skin daily    Yes Historical Provider, MD   oxyCODONE-acetaminophen (PERCOCET)  MG per tablet Take 1 tablet by mouth every 6 hours as needed for Pain .    Yes Historical Provider, MD   ferrous sulfate 325 (65 Fe) MG tablet Take 325 mg by mouth 2 times daily   Yes Historical Provider, MD   metFORMIN (GLUCOPHAGE) 1000 MG tablet Take 1,000 mg by mouth 2 times daily   Yes Historical Provider, MD   spironolactone (ALDACTONE) 25 MG tablet Take 75 mg by mouth 2 times daily    Yes Historical Provider, MD   SITagliptin (JANUVIA) 100 MG tablet Take 1 tablet by mouth daily 7/26/17  Yes Mohan Harley,    nadolol (CORGARD) 40 MG tablet Take 40 mg by mouth daily   Yes Historical Provider, MD   levothyroxine (SYNTHROID) 150 MCG tablet Take 150 mcg by mouth Daily   Yes Historical Provider, MD   nystatin (MYCOSTATIN) 747343 UNIT/GM powder Apply topically 2 times daily as needed TO ABDOMINAL FOLDS, GROIN Primary osteoarthritis of right knee; and Thyroid disease. Past Surgical History   has a past surgical history that includes Colonoscopy; Abdomen surgery; hernia repair; Endoscopy, colon, diagnostic; Upper gastrointestinal endoscopy (07/19/2017); egd transoral biopsy single/multiple (N/A, 7/19/2017); and deep dissec foot infec,1 bursa (Bilateral, 8/22/2017). Medications  Home Medications           Prior to Admission medications    Medication Sig Start Date End Date Taking?  Authorizing Provider   hydrOXYzine (ATARAX) 25 MG tablet Take 25 mg by mouth 3 times daily as needed for Itching     Yes Historical Provider, MD   oxybutynin (DITROPAN) 5 MG tablet Take 1 tablet by mouth 2 times daily 12/12/17     Camacho Samayoa,    bumetanide (BUMEX) 2 MG tablet Take 2 mg by mouth 2 times daily       Historical Provider, MD   insulin glargine (LANTUS) 100 UNIT/ML injection vial Inject 30 Units into the skin daily        Historical Provider, MD   oxyCODONE-acetaminophen (PERCOCET)  MG per tablet Take 1 tablet by mouth every 6 hours as needed for Pain .       Historical Provider, MD   ferrous sulfate 325 (65 Fe) MG tablet Take 325 mg by mouth 2 times daily       Historical Provider, MD   metFORMIN (GLUCOPHAGE) 1000 MG tablet Take 1,000 mg by mouth 2 times daily       Historical Provider, MD   ibuprofen (ADVIL;MOTRIN) 600 MG tablet Take 600 mg by mouth every 6 hours as needed for Pain       Historical Provider, MD   spironolactone (ALDACTONE) 25 MG tablet Take 75 mg by mouth 2 times daily        Historical Provider, MD   aspirin 81 MG EC tablet Take 1 tablet by mouth daily 7/26/17     Dominguez Aguilar,    SITagliptin (JANUVIA) 100 MG tablet Take 1 tablet by mouth daily 7/26/17     Kaya Harley,    nadolol (CORGARD) 40 MG tablet Take 40 mg by mouth daily       Historical Provider, MD   levothyroxine (SYNTHROID) 150 MCG tablet Take 150 mcg by mouth Daily       Historical Provider, MD   nystatin (MYCOSTATIN)

## 2018-01-24 NOTE — OP NOTE
[facility]    Operative Note    Elijah Heredia  YOB: 1965  [id number]      Pre-operative Diagnosis:Urinary retention, hypotonic bladder       Post-operative Diagnosis: Same    Procedure: Urethral dilation cystoscopy    Anesthesia: Local    Surgeons/Assistants: Dr Kim Arreguin    Estimated Blood Loss: None    Complications: None    Specimens: Was Obtained: Urine    Indications:47year-old male, with morbid obesity, urinary retention, significant penoscrotal swelling, anasarca, presently improved from the standpoint of edema and anasarca, The scrotal swelling has subsided after intensive diuresis. The patient has hypotonic bladder dysfunction, he had a Clemons catheter that was kept indwelling, he was started on alpha blocker      Operative Findings: Patient was brought to operating room, positioned in dorsal lithotomy, the patient has significant morbid obesity greater than 350 pounds. The penile shaft is receded in the suprapubic fat, adequate prepping and draping was carried out. There is a coronal hypospadias associated with the indwelling catheter. We proceeded with the urethral dilatation after prepping and draping and patient identification, we then proceeded with the urethroscopy, and cystoscopy, lateral lobe hypertrophy of the prostate was noted. The interior the bladder was carefully examined, there were no tumors ulcers or stones identified, chronic cystitis changes identified, ureteral orifices  effluxing clear urine. At the completion of the cystoscopy anterior bladder wall and lateral bladder walls were examined, the bladder was then emptied, the scope removed, the patient was returned to recovery room in satisfactory condition. Recommendations continue with the Flomax, the dose can be increased, 0.8 mg daily if needed. Antibiotic therapy initiated pending the culture results.     Findings discussed with the patient and his family    Electronically

## 2018-01-24 NOTE — ANESTHESIA PRE PROCEDURE
Date of last liquid consumption: 01/23/18                        Date of last solid food consumption: 01/23/18    BMI:   Wt Readings from Last 3 Encounters:   01/24/18 (!) 353 lb (160.1 kg)   01/22/18 (!) 353 lb (160.1 kg)   12/12/17 (!) 414 lb 14.4 oz (188.2 kg)     Body mass index is 49.23 kg/m².     CBC:   Lab Results   Component Value Date    WBC 8.8 01/22/2018    RBC 4.96 01/22/2018    RBC 3.93 04/02/2012    HGB 14.1 01/22/2018    HCT 43.8 01/22/2018    MCV 88.2 01/22/2018    RDW 14.2 01/22/2018     01/22/2018     04/02/2012       CMP:   Lab Results   Component Value Date     01/24/2018    K 4.9 01/24/2018    CL 87 01/24/2018    CO2 29 01/24/2018    BUN 16 01/22/2018    CREATININE 1.13 01/22/2018    GFRAA >60 01/22/2018    LABGLOM >60 01/22/2018    GLUCOSE 162 01/22/2018    GLUCOSE 102 08/30/2011    PROT 7.6 01/22/2018    CALCIUM 9.7 01/22/2018    BILITOT 0.51 01/22/2018    ALKPHOS 186 01/22/2018    AST 17 01/22/2018    ALT 22 01/22/2018       POC Tests:   Recent Labs      01/24/18   1015   POCGLU  140*       Coags:   Lab Results   Component Value Date    PROTIME 9.8 01/22/2018    PROTIME 11.6 03/30/2012    INR 0.9 01/22/2018    APTT 29.2 01/22/2018       HCG (If Applicable): No results found for: PREGTESTUR, PREGSERUM, HCG, HCGQUANT     ABGs:   Lab Results   Component Value Date    PHART 7.330 06/18/2014    PO2ART 84.0 06/18/2014    TVN0VEU 68.0 06/18/2014    UWW7ZIJ 34.9 06/18/2014    W4VGGHON 96.5 06/18/2014        Type & Screen (If Applicable):  No results found for: [facility]    Anesthesia Evaluation  Patient summary reviewed and Nursing notes reviewed  Airway: Mallampati: II  TM distance: >3 FB   Neck ROM: full  Mouth opening: > = 3 FB Dental: normal exam   (+) edentulous      Pulmonary:normal exam  breath sounds clear to auscultation                             Cardiovascular:  Exercise tolerance: no interval change,           Rhythm: regular  Rate: normal

## 2018-01-25 LAB
CULTURE: NO GROWTH
CULTURE: NORMAL
Lab: NORMAL
SPECIMEN DESCRIPTION: NORMAL
SPECIMEN DESCRIPTION: NORMAL
STATUS: NORMAL

## 2018-05-19 ENCOUNTER — APPOINTMENT (OUTPATIENT)
Dept: CT IMAGING | Age: 53
DRG: 354 | End: 2018-05-19
Payer: MEDICARE

## 2018-05-19 ENCOUNTER — HOSPITAL ENCOUNTER (INPATIENT)
Age: 53
LOS: 3 days | Discharge: LEFT BEFORE DISCHARGE | DRG: 354 | End: 2018-05-22
Attending: EMERGENCY MEDICINE | Admitting: INTERNAL MEDICINE
Payer: MEDICARE

## 2018-05-19 DIAGNOSIS — K56.609 SBO (SMALL BOWEL OBSTRUCTION) (HCC): Primary | ICD-10-CM

## 2018-05-19 DIAGNOSIS — K43.6: ICD-10-CM

## 2018-05-19 LAB
ABSOLUTE EOS #: 0 K/UL (ref 0–0.4)
ABSOLUTE IMMATURE GRANULOCYTE: ABNORMAL K/UL (ref 0–0.3)
ABSOLUTE LYMPH #: 1.42 K/UL (ref 1–4.8)
ABSOLUTE MONO #: 0.36 K/UL (ref 0.2–0.8)
ALBUMIN SERPL-MCNC: 4.2 G/DL (ref 3.5–5.2)
ALBUMIN/GLOBULIN RATIO: ABNORMAL (ref 1–2.5)
ALP BLD-CCNC: 210 U/L (ref 40–129)
ALT SERPL-CCNC: 21 U/L (ref 5–41)
AMYLASE: 40 U/L (ref 28–100)
ANION GAP SERPL CALCULATED.3IONS-SCNC: 15 MMOL/L (ref 9–17)
AST SERPL-CCNC: 15 U/L
BASOPHILS # BLD: 0 %
BASOPHILS ABSOLUTE: 0 K/UL (ref 0–0.2)
BILIRUB SERPL-MCNC: 0.78 MG/DL (ref 0.3–1.2)
BILIRUBIN DIRECT: 0.16 MG/DL
BILIRUBIN URINE: NEGATIVE
BILIRUBIN, INDIRECT: 0.62 MG/DL (ref 0–1)
BUN BLDV-MCNC: 16 MG/DL (ref 6–20)
BUN/CREAT BLD: 16 (ref 9–20)
CALCIUM SERPL-MCNC: 10.4 MG/DL (ref 8.6–10.4)
CHLORIDE BLD-SCNC: 90 MMOL/L (ref 98–107)
CO2: 30 MMOL/L (ref 20–31)
COLOR: YELLOW
COMMENT UA: ABNORMAL
CREAT SERPL-MCNC: 1.03 MG/DL (ref 0.7–1.2)
DIFFERENTIAL TYPE: ABNORMAL
EOSINOPHILS RELATIVE PERCENT: 0 % (ref 1–4)
GFR AFRICAN AMERICAN: >60 ML/MIN
GFR NON-AFRICAN AMERICAN: >60 ML/MIN
GFR SERPL CREATININE-BSD FRML MDRD: ABNORMAL ML/MIN/{1.73_M2}
GFR SERPL CREATININE-BSD FRML MDRD: ABNORMAL ML/MIN/{1.73_M2}
GLOBULIN: ABNORMAL G/DL (ref 1.5–3.8)
GLUCOSE BLD-MCNC: 176 MG/DL (ref 75–110)
GLUCOSE BLD-MCNC: 204 MG/DL (ref 70–99)
GLUCOSE URINE: NEGATIVE
HCT VFR BLD CALC: 46.7 % (ref 41–53)
HEMOGLOBIN: 15.2 G/DL (ref 13.5–17.5)
IMMATURE GRANULOCYTES: ABNORMAL %
INR BLD: 1
KETONES, URINE: NEGATIVE
LEUKOCYTE ESTERASE, URINE: NEGATIVE
LIPASE: 17 U/L (ref 13–60)
LYMPHOCYTES # BLD: 8 % (ref 24–44)
MCH RBC QN AUTO: 30 PG (ref 26–34)
MCHC RBC AUTO-ENTMCNC: 32.6 G/DL (ref 31–37)
MCV RBC AUTO: 92.2 FL (ref 80–100)
MONOCYTES # BLD: 2 % (ref 1–7)
MORPHOLOGY: ABNORMAL
NITRITE, URINE: NEGATIVE
NRBC AUTOMATED: ABNORMAL PER 100 WBC
PARTIAL THROMBOPLASTIN TIME: 29.1 SEC (ref 23–31)
PDW BLD-RTO: 14.5 % (ref 11.5–14.5)
PH UA: 8.5 (ref 5–8)
PLATELET # BLD: 287 K/UL (ref 130–400)
PLATELET ESTIMATE: ABNORMAL
PMV BLD AUTO: ABNORMAL FL (ref 6–12)
POTASSIUM SERPL-SCNC: 4.9 MMOL/L (ref 3.7–5.3)
PROTEIN UA: NEGATIVE
PROTHROMBIN TIME: 10 SEC (ref 9.7–11.6)
RBC # BLD: 5.06 M/UL (ref 4.5–5.9)
RBC # BLD: ABNORMAL 10*6/UL
SEG NEUTROPHILS: 90 % (ref 36–66)
SEGMENTED NEUTROPHILS ABSOLUTE COUNT: 16.02 K/UL (ref 1.8–7.7)
SODIUM BLD-SCNC: 135 MMOL/L (ref 135–144)
SPECIFIC GRAVITY UA: 1.01 (ref 1–1.03)
TOTAL PROTEIN: 8.1 G/DL (ref 6.4–8.3)
TURBIDITY: CLEAR
URINE HGB: NEGATIVE
UROBILINOGEN, URINE: NORMAL
WBC # BLD: 17.8 K/UL (ref 3.5–11)
WBC # BLD: ABNORMAL 10*3/UL

## 2018-05-19 PROCEDURE — 82947 ASSAY GLUCOSE BLOOD QUANT: CPT

## 2018-05-19 PROCEDURE — 96374 THER/PROPH/DIAG INJ IV PUSH: CPT

## 2018-05-19 PROCEDURE — 85025 COMPLETE CBC W/AUTO DIFF WBC: CPT

## 2018-05-19 PROCEDURE — 2580000003 HC RX 258: Performed by: NURSE PRACTITIONER

## 2018-05-19 PROCEDURE — 74177 CT ABD & PELVIS W/CONTRAST: CPT

## 2018-05-19 PROCEDURE — 82150 ASSAY OF AMYLASE: CPT

## 2018-05-19 PROCEDURE — 6360000004 HC RX CONTRAST MEDICATION: Performed by: NURSE PRACTITIONER

## 2018-05-19 PROCEDURE — 6370000000 HC RX 637 (ALT 250 FOR IP): Performed by: EMERGENCY MEDICINE

## 2018-05-19 PROCEDURE — 85730 THROMBOPLASTIN TIME PARTIAL: CPT

## 2018-05-19 PROCEDURE — 99285 EMERGENCY DEPT VISIT HI MDM: CPT

## 2018-05-19 PROCEDURE — 6360000002 HC RX W HCPCS: Performed by: EMERGENCY MEDICINE

## 2018-05-19 PROCEDURE — 6360000002 HC RX W HCPCS: Performed by: NURSE PRACTITIONER

## 2018-05-19 PROCEDURE — 80076 HEPATIC FUNCTION PANEL: CPT

## 2018-05-19 PROCEDURE — 85610 PROTHROMBIN TIME: CPT

## 2018-05-19 PROCEDURE — 96375 TX/PRO/DX INJ NEW DRUG ADDON: CPT

## 2018-05-19 PROCEDURE — 83690 ASSAY OF LIPASE: CPT

## 2018-05-19 PROCEDURE — 1200000000 HC SEMI PRIVATE

## 2018-05-19 PROCEDURE — 80048 BASIC METABOLIC PNL TOTAL CA: CPT

## 2018-05-19 PROCEDURE — 36415 COLL VENOUS BLD VENIPUNCTURE: CPT

## 2018-05-19 PROCEDURE — 81003 URINALYSIS AUTO W/O SCOPE: CPT

## 2018-05-19 RX ORDER — SODIUM CHLORIDE 9 MG/ML
INJECTION, SOLUTION INTRAVENOUS CONTINUOUS
Status: DISCONTINUED | OUTPATIENT
Start: 2018-05-19 | End: 2018-05-20

## 2018-05-19 RX ORDER — BUMETANIDE 1 MG/1
2 TABLET ORAL 2 TIMES DAILY
Status: DISCONTINUED | OUTPATIENT
Start: 2018-05-19 | End: 2018-05-20

## 2018-05-19 RX ORDER — CLONAZEPAM 0.5 MG/1
1 TABLET ORAL 2 TIMES DAILY PRN
Status: DISCONTINUED | OUTPATIENT
Start: 2018-05-19 | End: 2018-05-20

## 2018-05-19 RX ORDER — NICOTINE 21 MG/24HR
1 PATCH, TRANSDERMAL 24 HOURS TRANSDERMAL DAILY PRN
Status: DISCONTINUED | OUTPATIENT
Start: 2018-05-19 | End: 2018-05-22 | Stop reason: HOSPADM

## 2018-05-19 RX ORDER — QUETIAPINE 150 MG/1
150 TABLET, FILM COATED, EXTENDED RELEASE ORAL NIGHTLY
Status: DISCONTINUED | OUTPATIENT
Start: 2018-05-19 | End: 2018-05-20

## 2018-05-19 RX ORDER — ONDANSETRON 2 MG/ML
4 INJECTION INTRAMUSCULAR; INTRAVENOUS EVERY 6 HOURS PRN
Status: DISCONTINUED | OUTPATIENT
Start: 2018-05-19 | End: 2018-05-19 | Stop reason: SDUPTHER

## 2018-05-19 RX ORDER — VENLAFAXINE HYDROCHLORIDE 75 MG/1
150 CAPSULE, EXTENDED RELEASE ORAL DAILY
Status: DISCONTINUED | OUTPATIENT
Start: 2018-05-20 | End: 2018-05-20

## 2018-05-19 RX ORDER — POTASSIUM CHLORIDE 7.45 MG/ML
10 INJECTION INTRAVENOUS PRN
Status: DISCONTINUED | OUTPATIENT
Start: 2018-05-19 | End: 2018-05-22 | Stop reason: HOSPADM

## 2018-05-19 RX ORDER — SODIUM CHLORIDE 0.9 % (FLUSH) 0.9 %
10 SYRINGE (ML) INJECTION EVERY 12 HOURS SCHEDULED
Status: DISCONTINUED | OUTPATIENT
Start: 2018-05-19 | End: 2018-05-22 | Stop reason: HOSPADM

## 2018-05-19 RX ORDER — DEXTROSE MONOHYDRATE 25 G/50ML
12.5 INJECTION, SOLUTION INTRAVENOUS PRN
Status: DISCONTINUED | OUTPATIENT
Start: 2018-05-19 | End: 2018-05-22 | Stop reason: HOSPADM

## 2018-05-19 RX ORDER — DEXTROSE MONOHYDRATE 50 MG/ML
100 INJECTION, SOLUTION INTRAVENOUS PRN
Status: DISCONTINUED | OUTPATIENT
Start: 2018-05-19 | End: 2018-05-22 | Stop reason: HOSPADM

## 2018-05-19 RX ORDER — FENTANYL CITRATE 50 UG/ML
50 INJECTION, SOLUTION INTRAMUSCULAR; INTRAVENOUS ONCE
Status: COMPLETED | OUTPATIENT
Start: 2018-05-19 | End: 2018-05-19

## 2018-05-19 RX ORDER — PREGABALIN 75 MG/1
150 CAPSULE ORAL 2 TIMES DAILY
Status: DISCONTINUED | OUTPATIENT
Start: 2018-05-19 | End: 2018-05-20

## 2018-05-19 RX ORDER — SODIUM CHLORIDE 0.9 % (FLUSH) 0.9 %
10 SYRINGE (ML) INJECTION PRN
Status: DISCONTINUED | OUTPATIENT
Start: 2018-05-19 | End: 2018-05-20

## 2018-05-19 RX ORDER — PROMETHAZINE HYDROCHLORIDE 25 MG/ML
12.5 INJECTION, SOLUTION INTRAMUSCULAR; INTRAVENOUS ONCE
Status: COMPLETED | OUTPATIENT
Start: 2018-05-19 | End: 2018-05-19

## 2018-05-19 RX ORDER — ASPIRIN 81 MG/1
81 TABLET ORAL DAILY
Status: DISCONTINUED | OUTPATIENT
Start: 2018-05-20 | End: 2018-05-20

## 2018-05-19 RX ORDER — INSULIN GLARGINE 100 [IU]/ML
30 INJECTION, SOLUTION SUBCUTANEOUS DAILY
Status: DISCONTINUED | OUTPATIENT
Start: 2018-05-20 | End: 2018-05-20

## 2018-05-19 RX ORDER — POTASSIUM CHLORIDE 20 MEQ/1
40 TABLET, EXTENDED RELEASE ORAL PRN
Status: DISCONTINUED | OUTPATIENT
Start: 2018-05-19 | End: 2018-05-20

## 2018-05-19 RX ORDER — POTASSIUM CHLORIDE 20MEQ/15ML
40 LIQUID (ML) ORAL PRN
Status: DISCONTINUED | OUTPATIENT
Start: 2018-05-19 | End: 2018-05-20

## 2018-05-19 RX ORDER — DULOXETIN HYDROCHLORIDE 60 MG/1
60 CAPSULE, DELAYED RELEASE ORAL EVERY MORNING
Status: DISCONTINUED | OUTPATIENT
Start: 2018-05-20 | End: 2018-05-20

## 2018-05-19 RX ORDER — TAMSULOSIN HYDROCHLORIDE 0.4 MG/1
0.4 CAPSULE ORAL DAILY
Status: DISCONTINUED | OUTPATIENT
Start: 2018-05-20 | End: 2018-05-20

## 2018-05-19 RX ORDER — SODIUM CHLORIDE 0.9 % (FLUSH) 0.9 %
10 SYRINGE (ML) INJECTION PRN
Status: DISCONTINUED | OUTPATIENT
Start: 2018-05-19 | End: 2018-05-19

## 2018-05-19 RX ORDER — ONDANSETRON 4 MG/1
4 TABLET, ORALLY DISINTEGRATING ORAL EVERY 6 HOURS PRN
Status: DISCONTINUED | OUTPATIENT
Start: 2018-05-19 | End: 2018-05-20

## 2018-05-19 RX ORDER — PANTOPRAZOLE SODIUM 40 MG/1
40 TABLET, DELAYED RELEASE ORAL
Status: DISCONTINUED | OUTPATIENT
Start: 2018-05-20 | End: 2018-05-20

## 2018-05-19 RX ORDER — ONDANSETRON 2 MG/ML
4 INJECTION INTRAMUSCULAR; INTRAVENOUS EVERY 6 HOURS PRN
Status: DISCONTINUED | OUTPATIENT
Start: 2018-05-19 | End: 2018-05-22 | Stop reason: HOSPADM

## 2018-05-19 RX ORDER — BISACODYL 10 MG
10 SUPPOSITORY, RECTAL RECTAL DAILY PRN
Status: DISCONTINUED | OUTPATIENT
Start: 2018-05-19 | End: 2018-05-20

## 2018-05-19 RX ORDER — 0.9 % SODIUM CHLORIDE 0.9 %
80 INTRAVENOUS SOLUTION INTRAVENOUS ONCE
Status: COMPLETED | OUTPATIENT
Start: 2018-05-19 | End: 2018-05-19

## 2018-05-19 RX ORDER — MAGNESIUM SULFATE 1 G/100ML
1 INJECTION INTRAVENOUS PRN
Status: DISCONTINUED | OUTPATIENT
Start: 2018-05-19 | End: 2018-05-22 | Stop reason: HOSPADM

## 2018-05-19 RX ORDER — SPIRONOLACTONE 25 MG/1
75 TABLET ORAL 2 TIMES DAILY
Status: DISCONTINUED | OUTPATIENT
Start: 2018-05-19 | End: 2018-05-20

## 2018-05-19 RX ORDER — NADOLOL 20 MG/1
40 TABLET ORAL DAILY
Status: DISCONTINUED | OUTPATIENT
Start: 2018-05-20 | End: 2018-05-20

## 2018-05-19 RX ORDER — LEVOTHYROXINE SODIUM 0.15 MG/1
150 TABLET ORAL DAILY
Status: DISCONTINUED | OUTPATIENT
Start: 2018-05-20 | End: 2018-05-20

## 2018-05-19 RX ORDER — ONDANSETRON 2 MG/ML
4 INJECTION INTRAMUSCULAR; INTRAVENOUS ONCE
Status: COMPLETED | OUTPATIENT
Start: 2018-05-19 | End: 2018-05-19

## 2018-05-19 RX ORDER — 0.9 % SODIUM CHLORIDE 0.9 %
1000 INTRAVENOUS SOLUTION INTRAVENOUS ONCE
Status: COMPLETED | OUTPATIENT
Start: 2018-05-19 | End: 2018-05-19

## 2018-05-19 RX ORDER — NICOTINE POLACRILEX 4 MG
15 LOZENGE BUCCAL PRN
Status: DISCONTINUED | OUTPATIENT
Start: 2018-05-19 | End: 2018-05-22 | Stop reason: HOSPADM

## 2018-05-19 RX ADMIN — SODIUM CHLORIDE 80 ML: 9 INJECTION, SOLUTION INTRAVENOUS at 21:02

## 2018-05-19 RX ADMIN — PROMETHAZINE HYDROCHLORIDE 12.5 MG: 25 INJECTION INTRAMUSCULAR; INTRAVENOUS at 21:27

## 2018-05-19 RX ADMIN — Medication 10 ML: at 21:02

## 2018-05-19 RX ADMIN — IOPAMIDOL 75 ML: 755 INJECTION, SOLUTION INTRAVENOUS at 21:02

## 2018-05-19 RX ADMIN — Medication 1 MG: at 21:45

## 2018-05-19 RX ADMIN — ONDANSETRON 4 MG: 2 INJECTION INTRAMUSCULAR; INTRAVENOUS at 20:19

## 2018-05-19 RX ADMIN — SODIUM CHLORIDE 1000 ML: 9 INJECTION, SOLUTION INTRAVENOUS at 20:19

## 2018-05-19 RX ADMIN — FENTANYL CITRATE 50 MCG: 50 INJECTION, SOLUTION INTRAMUSCULAR; INTRAVENOUS at 21:27

## 2018-05-19 RX ADMIN — LIDOCAINE HYDROCHLORIDE 5 ML: 20 JELLY TOPICAL at 21:45

## 2018-05-19 ASSESSMENT — PAIN DESCRIPTION - DESCRIPTORS: DESCRIPTORS: SHARP

## 2018-05-19 ASSESSMENT — PAIN SCALES - GENERAL
PAINLEVEL_OUTOF10: 10
PAINLEVEL_OUTOF10: 6
PAINLEVEL_OUTOF10: 10

## 2018-05-19 ASSESSMENT — PAIN DESCRIPTION - LOCATION
LOCATION: ABDOMEN
LOCATION: ABDOMEN

## 2018-05-19 ASSESSMENT — PAIN DESCRIPTION - PAIN TYPE
TYPE: ACUTE PAIN
TYPE: ACUTE PAIN

## 2018-05-20 ENCOUNTER — ANESTHESIA EVENT (OUTPATIENT)
Dept: OPERATING ROOM | Age: 53
DRG: 354 | End: 2018-05-20
Payer: MEDICARE

## 2018-05-20 ENCOUNTER — ANESTHESIA (OUTPATIENT)
Dept: OPERATING ROOM | Age: 53
DRG: 354 | End: 2018-05-20
Payer: MEDICARE

## 2018-05-20 VITALS — TEMPERATURE: 99 F | OXYGEN SATURATION: 94 % | DIASTOLIC BLOOD PRESSURE: 57 MMHG | SYSTOLIC BLOOD PRESSURE: 133 MMHG

## 2018-05-20 PROBLEM — K43.0 INCISIONAL HERNIA WITH OBSTRUCTION BUT NO GANGRENE: Status: ACTIVE | Noted: 2018-05-20

## 2018-05-20 LAB
ABO/RH: NORMAL
ALBUMIN SERPL-MCNC: 4 G/DL (ref 3.5–5.2)
ALBUMIN/GLOBULIN RATIO: ABNORMAL (ref 1–2.5)
ALP BLD-CCNC: 191 U/L (ref 40–129)
ALT SERPL-CCNC: 19 U/L (ref 5–41)
AMMONIA: 27 UMOL/L (ref 16–60)
ANION GAP SERPL CALCULATED.3IONS-SCNC: 13 MMOL/L (ref 9–17)
ANTIBODY SCREEN: NEGATIVE
ARM BAND NUMBER: NORMAL
AST SERPL-CCNC: 14 U/L
BILIRUB SERPL-MCNC: 0.74 MG/DL (ref 0.3–1.2)
BUN BLDV-MCNC: 17 MG/DL (ref 6–20)
BUN/CREAT BLD: 19 (ref 9–20)
CALCIUM SERPL-MCNC: 9.5 MG/DL (ref 8.6–10.4)
CHLORIDE BLD-SCNC: 94 MMOL/L (ref 98–107)
CO2: 28 MMOL/L (ref 20–31)
CREAT SERPL-MCNC: 0.89 MG/DL (ref 0.7–1.2)
EKG ATRIAL RATE: 72 BPM
EKG P AXIS: 31 DEGREES
EKG P-R INTERVAL: 162 MS
EKG Q-T INTERVAL: 414 MS
EKG QRS DURATION: 98 MS
EKG QTC CALCULATION (BAZETT): 453 MS
EKG R AXIS: -18 DEGREES
EKG T AXIS: 20 DEGREES
EKG VENTRICULAR RATE: 72 BPM
ESTIMATED AVERAGE GLUCOSE: 166 MG/DL
EXPIRATION DATE: NORMAL
GFR AFRICAN AMERICAN: >60 ML/MIN
GFR NON-AFRICAN AMERICAN: >60 ML/MIN
GFR SERPL CREATININE-BSD FRML MDRD: ABNORMAL ML/MIN/{1.73_M2}
GFR SERPL CREATININE-BSD FRML MDRD: ABNORMAL ML/MIN/{1.73_M2}
GLUCOSE BLD-MCNC: 121 MG/DL (ref 75–110)
GLUCOSE BLD-MCNC: 161 MG/DL (ref 75–110)
GLUCOSE BLD-MCNC: 161 MG/DL (ref 75–110)
GLUCOSE BLD-MCNC: 172 MG/DL (ref 75–110)
GLUCOSE BLD-MCNC: 174 MG/DL (ref 75–110)
GLUCOSE BLD-MCNC: 196 MG/DL (ref 70–99)
HBA1C MFR BLD: 7.4 % (ref 4–6)
HCT VFR BLD CALC: 43.9 % (ref 41–53)
HEMOGLOBIN: 13.9 G/DL (ref 13.5–17.5)
MAGNESIUM: 2.9 MG/DL (ref 1.6–2.6)
MCH RBC QN AUTO: 29.5 PG (ref 26–34)
MCHC RBC AUTO-ENTMCNC: 31.8 G/DL (ref 31–37)
MCV RBC AUTO: 92.7 FL (ref 80–100)
NRBC AUTOMATED: ABNORMAL PER 100 WBC
PDW BLD-RTO: 14.8 % (ref 11.5–14.5)
PHOSPHORUS: 3.3 MG/DL (ref 2.5–4.5)
PLATELET # BLD: 250 K/UL (ref 130–400)
PMV BLD AUTO: 7.7 FL (ref 6–12)
POTASSIUM SERPL-SCNC: 4.9 MMOL/L (ref 3.7–5.3)
RBC # BLD: 4.73 M/UL (ref 4.5–5.9)
SODIUM BLD-SCNC: 135 MMOL/L (ref 135–144)
TOTAL PROTEIN: 7.9 G/DL (ref 6.4–8.3)
TSH SERPL DL<=0.05 MIU/L-ACNC: 4.72 MIU/L (ref 0.3–5)
WBC # BLD: 14.3 K/UL (ref 3.5–11)

## 2018-05-20 PROCEDURE — 1200000000 HC SEMI PRIVATE

## 2018-05-20 PROCEDURE — 83036 HEMOGLOBIN GLYCOSYLATED A1C: CPT

## 2018-05-20 PROCEDURE — 6370000000 HC RX 637 (ALT 250 FOR IP): Performed by: NURSE PRACTITIONER

## 2018-05-20 PROCEDURE — 86900 BLOOD TYPING SEROLOGIC ABO: CPT

## 2018-05-20 PROCEDURE — 2500000003 HC RX 250 WO HCPCS: Performed by: INTERNAL MEDICINE

## 2018-05-20 PROCEDURE — 99223 1ST HOSP IP/OBS HIGH 75: CPT | Performed by: INTERNAL MEDICINE

## 2018-05-20 PROCEDURE — 85027 COMPLETE CBC AUTOMATED: CPT

## 2018-05-20 PROCEDURE — 93005 ELECTROCARDIOGRAM TRACING: CPT

## 2018-05-20 PROCEDURE — 7100000000 HC PACU RECOVERY - FIRST 15 MIN: Performed by: SURGERY

## 2018-05-20 PROCEDURE — 3600000002 HC SURGERY LEVEL 2 BASE: Performed by: SURGERY

## 2018-05-20 PROCEDURE — 82947 ASSAY GLUCOSE BLOOD QUANT: CPT

## 2018-05-20 PROCEDURE — 6360000002 HC RX W HCPCS: Performed by: INTERNAL MEDICINE

## 2018-05-20 PROCEDURE — 3700000001 HC ADD 15 MINUTES (ANESTHESIA): Performed by: SURGERY

## 2018-05-20 PROCEDURE — 7100000001 HC PACU RECOVERY - ADDTL 15 MIN: Performed by: SURGERY

## 2018-05-20 PROCEDURE — 2580000003 HC RX 258: Performed by: SURGERY

## 2018-05-20 PROCEDURE — 2580000003 HC RX 258: Performed by: INTERNAL MEDICINE

## 2018-05-20 PROCEDURE — C9113 INJ PANTOPRAZOLE SODIUM, VIA: HCPCS | Performed by: INTERNAL MEDICINE

## 2018-05-20 PROCEDURE — C1781 MESH (IMPLANTABLE): HCPCS | Performed by: SURGERY

## 2018-05-20 PROCEDURE — 3700000000 HC ANESTHESIA ATTENDED CARE: Performed by: SURGERY

## 2018-05-20 PROCEDURE — 84100 ASSAY OF PHOSPHORUS: CPT

## 2018-05-20 PROCEDURE — 2580000003 HC RX 258: Performed by: ANESTHESIOLOGY

## 2018-05-20 PROCEDURE — 2500000003 HC RX 250 WO HCPCS: Performed by: SURGERY

## 2018-05-20 PROCEDURE — 6360000002 HC RX W HCPCS: Performed by: ANESTHESIOLOGY

## 2018-05-20 PROCEDURE — 6370000000 HC RX 637 (ALT 250 FOR IP): Performed by: INTERNAL MEDICINE

## 2018-05-20 PROCEDURE — 84443 ASSAY THYROID STIM HORMONE: CPT

## 2018-05-20 PROCEDURE — 83735 ASSAY OF MAGNESIUM: CPT

## 2018-05-20 PROCEDURE — 2500000003 HC RX 250 WO HCPCS: Performed by: NURSE PRACTITIONER

## 2018-05-20 PROCEDURE — 88302 TISSUE EXAM BY PATHOLOGIST: CPT

## 2018-05-20 PROCEDURE — 36415 COLL VENOUS BLD VENIPUNCTURE: CPT

## 2018-05-20 PROCEDURE — 6360000002 HC RX W HCPCS: Performed by: NURSE PRACTITIONER

## 2018-05-20 PROCEDURE — 2500000003 HC RX 250 WO HCPCS: Performed by: ANESTHESIOLOGY

## 2018-05-20 PROCEDURE — 82140 ASSAY OF AMMONIA: CPT

## 2018-05-20 PROCEDURE — 86850 RBC ANTIBODY SCREEN: CPT

## 2018-05-20 PROCEDURE — 86901 BLOOD TYPING SEROLOGIC RH(D): CPT

## 2018-05-20 PROCEDURE — 80053 COMPREHEN METABOLIC PANEL: CPT

## 2018-05-20 PROCEDURE — 2580000003 HC RX 258: Performed by: NURSE PRACTITIONER

## 2018-05-20 PROCEDURE — 6360000002 HC RX W HCPCS: Performed by: SURGERY

## 2018-05-20 PROCEDURE — 3600000012 HC SURGERY LEVEL 2 ADDTL 15MIN: Performed by: SURGERY

## 2018-05-20 PROCEDURE — 0WUF0JZ SUPPLEMENT ABDOMINAL WALL WITH SYNTHETIC SUBSTITUTE, OPEN APPROACH: ICD-10-PCS | Performed by: SURGERY

## 2018-05-20 DEVICE — MESH SURG W4XL6IN STD FOR INGUINAL HERN REP PROLITE ULT: Type: IMPLANTABLE DEVICE | Site: ABDOMEN | Status: FUNCTIONAL

## 2018-05-20 RX ORDER — OXYCODONE HYDROCHLORIDE AND ACETAMINOPHEN 5; 325 MG/1; MG/1
1 TABLET ORAL
Status: DISCONTINUED | OUTPATIENT
Start: 2018-05-20 | End: 2018-05-20 | Stop reason: HOSPADM

## 2018-05-20 RX ORDER — METOPROLOL TARTRATE 5 MG/5ML
5 INJECTION INTRAVENOUS EVERY 6 HOURS
Status: DISCONTINUED | OUTPATIENT
Start: 2018-05-20 | End: 2018-05-22 | Stop reason: HOSPADM

## 2018-05-20 RX ORDER — KETOROLAC TROMETHAMINE 30 MG/ML
INJECTION, SOLUTION INTRAMUSCULAR; INTRAVENOUS PRN
Status: DISCONTINUED | OUTPATIENT
Start: 2018-05-20 | End: 2018-05-20 | Stop reason: SDUPTHER

## 2018-05-20 RX ORDER — ONDANSETRON 2 MG/ML
4 INJECTION INTRAMUSCULAR; INTRAVENOUS
Status: DISCONTINUED | OUTPATIENT
Start: 2018-05-20 | End: 2018-05-20 | Stop reason: HOSPADM

## 2018-05-20 RX ORDER — SODIUM CHLORIDE 9 MG/ML
INJECTION, SOLUTION INTRAVENOUS CONTINUOUS
Status: DISCONTINUED | OUTPATIENT
Start: 2018-05-20 | End: 2018-05-21

## 2018-05-20 RX ORDER — PANTOPRAZOLE SODIUM 40 MG/10ML
40 INJECTION, POWDER, LYOPHILIZED, FOR SOLUTION INTRAVENOUS 2 TIMES DAILY
Status: DISCONTINUED | OUTPATIENT
Start: 2018-05-20 | End: 2018-05-22 | Stop reason: HOSPADM

## 2018-05-20 RX ORDER — FENTANYL CITRATE 50 UG/ML
50 INJECTION, SOLUTION INTRAMUSCULAR; INTRAVENOUS EVERY 5 MIN PRN
Status: DISCONTINUED | OUTPATIENT
Start: 2018-05-20 | End: 2018-05-20 | Stop reason: HOSPADM

## 2018-05-20 RX ORDER — LORAZEPAM 2 MG/ML
1 INJECTION INTRAMUSCULAR EVERY 6 HOURS PRN
Status: DISCONTINUED | OUTPATIENT
Start: 2018-05-20 | End: 2018-05-22 | Stop reason: HOSPADM

## 2018-05-20 RX ORDER — ROCURONIUM BROMIDE 10 MG/ML
INJECTION, SOLUTION INTRAVENOUS PRN
Status: DISCONTINUED | OUTPATIENT
Start: 2018-05-20 | End: 2018-05-20 | Stop reason: SDUPTHER

## 2018-05-20 RX ORDER — ONDANSETRON 2 MG/ML
INJECTION INTRAMUSCULAR; INTRAVENOUS PRN
Status: DISCONTINUED | OUTPATIENT
Start: 2018-05-20 | End: 2018-05-20 | Stop reason: SDUPTHER

## 2018-05-20 RX ORDER — CEFAZOLIN SODIUM 1 G/3ML
INJECTION, POWDER, FOR SOLUTION INTRAMUSCULAR; INTRAVENOUS PRN
Status: DISCONTINUED | OUTPATIENT
Start: 2018-05-20 | End: 2018-05-20 | Stop reason: SDUPTHER

## 2018-05-20 RX ORDER — LORAZEPAM 2 MG/ML
0.5 INJECTION INTRAMUSCULAR ONCE
Status: COMPLETED | OUTPATIENT
Start: 2018-05-20 | End: 2018-05-20

## 2018-05-20 RX ORDER — ACETAMINOPHEN 500 MG
1000 TABLET ORAL EVERY 6 HOURS PRN
Status: DISCONTINUED | OUTPATIENT
Start: 2018-05-20 | End: 2018-05-20

## 2018-05-20 RX ORDER — LEVOTHYROXINE SODIUM ANHYDROUS 100 UG/5ML
75 INJECTION, POWDER, LYOPHILIZED, FOR SOLUTION INTRAVENOUS DAILY
Status: DISCONTINUED | OUTPATIENT
Start: 2018-05-20 | End: 2018-05-20 | Stop reason: SDUPTHER

## 2018-05-20 RX ORDER — PROPOFOL 10 MG/ML
INJECTION, EMULSION INTRAVENOUS PRN
Status: DISCONTINUED | OUTPATIENT
Start: 2018-05-20 | End: 2018-05-20 | Stop reason: SDUPTHER

## 2018-05-20 RX ORDER — LIDOCAINE HYDROCHLORIDE 20 MG/ML
INJECTION, SOLUTION INFILTRATION; PERINEURAL PRN
Status: DISCONTINUED | OUTPATIENT
Start: 2018-05-20 | End: 2018-05-20 | Stop reason: SDUPTHER

## 2018-05-20 RX ORDER — 0.9 % SODIUM CHLORIDE 0.9 %
5 VIAL (ML) INJECTION DAILY
Status: DISCONTINUED | OUTPATIENT
Start: 2018-05-20 | End: 2018-05-20

## 2018-05-20 RX ORDER — BUPIVACAINE HYDROCHLORIDE AND EPINEPHRINE 5; 5 MG/ML; UG/ML
INJECTION, SOLUTION EPIDURAL; INTRACAUDAL; PERINEURAL PRN
Status: DISCONTINUED | OUTPATIENT
Start: 2018-05-20 | End: 2018-05-20 | Stop reason: HOSPADM

## 2018-05-20 RX ORDER — FENTANYL CITRATE 50 UG/ML
25 INJECTION, SOLUTION INTRAMUSCULAR; INTRAVENOUS EVERY 5 MIN PRN
Status: DISCONTINUED | OUTPATIENT
Start: 2018-05-20 | End: 2018-05-20 | Stop reason: HOSPADM

## 2018-05-20 RX ORDER — 0.9 % SODIUM CHLORIDE 0.9 %
10 VIAL (ML) INJECTION 2 TIMES DAILY
Status: DISCONTINUED | OUTPATIENT
Start: 2018-05-20 | End: 2018-05-22 | Stop reason: HOSPADM

## 2018-05-20 RX ORDER — FENTANYL CITRATE 50 UG/ML
50 INJECTION, SOLUTION INTRAMUSCULAR; INTRAVENOUS
Status: DISCONTINUED | OUTPATIENT
Start: 2018-05-20 | End: 2018-05-20

## 2018-05-20 RX ORDER — SUCCINYLCHOLINE/SOD CL,ISO/PF 100 MG/5ML
SYRINGE (ML) INTRAVENOUS PRN
Status: DISCONTINUED | OUTPATIENT
Start: 2018-05-20 | End: 2018-05-20 | Stop reason: SDUPTHER

## 2018-05-20 RX ORDER — MIDAZOLAM HYDROCHLORIDE 1 MG/ML
INJECTION INTRAMUSCULAR; INTRAVENOUS PRN
Status: DISCONTINUED | OUTPATIENT
Start: 2018-05-20 | End: 2018-05-20 | Stop reason: SDUPTHER

## 2018-05-20 RX ORDER — 0.9 % SODIUM CHLORIDE 0.9 %
5 VIAL (ML) INJECTION DAILY
Status: DISCONTINUED | OUTPATIENT
Start: 2018-05-20 | End: 2018-05-20 | Stop reason: SDUPTHER

## 2018-05-20 RX ORDER — SODIUM CHLORIDE 9 MG/ML
INJECTION, SOLUTION INTRAVENOUS CONTINUOUS PRN
Status: DISCONTINUED | OUTPATIENT
Start: 2018-05-20 | End: 2018-05-20 | Stop reason: SDUPTHER

## 2018-05-20 RX ORDER — FENTANYL CITRATE 50 UG/ML
INJECTION, SOLUTION INTRAMUSCULAR; INTRAVENOUS PRN
Status: DISCONTINUED | OUTPATIENT
Start: 2018-05-20 | End: 2018-05-20 | Stop reason: SDUPTHER

## 2018-05-20 RX ORDER — HYDROMORPHONE HCL 110MG/55ML
1.5 PATIENT CONTROLLED ANALGESIA SYRINGE INTRAVENOUS
Status: DISCONTINUED | OUTPATIENT
Start: 2018-05-20 | End: 2018-05-21

## 2018-05-20 RX ORDER — LEVOTHYROXINE SODIUM ANHYDROUS 100 UG/5ML
75 INJECTION, POWDER, LYOPHILIZED, FOR SOLUTION INTRAVENOUS DAILY
Status: DISCONTINUED | OUTPATIENT
Start: 2018-05-26 | End: 2018-05-21 | Stop reason: ALTCHOICE

## 2018-05-20 RX ADMIN — CEFAZOLIN 3000 MG: 1 INJECTION, POWDER, FOR SOLUTION INTRAMUSCULAR; INTRAVENOUS at 12:09

## 2018-05-20 RX ADMIN — ONDANSETRON 4 MG: 2 INJECTION, SOLUTION INTRAMUSCULAR; INTRAVENOUS at 13:38

## 2018-05-20 RX ADMIN — SODIUM CHLORIDE: 9 INJECTION, SOLUTION INTRAVENOUS at 11:54

## 2018-05-20 RX ADMIN — INSULIN LISPRO 3 UNITS: 100 INJECTION, SOLUTION INTRAVENOUS; SUBCUTANEOUS at 17:50

## 2018-05-20 RX ADMIN — CHLORASEPTIC 1 SPRAY: 1.5 LIQUID ORAL at 07:18

## 2018-05-20 RX ADMIN — ONDANSETRON 4 MG: 2 INJECTION INTRAMUSCULAR; INTRAVENOUS at 22:03

## 2018-05-20 RX ADMIN — METOPROLOL TARTRATE 5 MG: 1 INJECTION, SOLUTION INTRAVENOUS at 10:31

## 2018-05-20 RX ADMIN — METOPROLOL TARTRATE 5 MG: 1 INJECTION, SOLUTION INTRAVENOUS at 22:03

## 2018-05-20 RX ADMIN — ONDANSETRON 4 MG: 2 INJECTION INTRAMUSCULAR; INTRAVENOUS at 15:19

## 2018-05-20 RX ADMIN — SUGAMMADEX 200 MG: 100 INJECTION, SOLUTION INTRAVENOUS at 13:23

## 2018-05-20 RX ADMIN — LIDOCAINE HYDROCHLORIDE 100 MG: 20 INJECTION, SOLUTION INFILTRATION; PERINEURAL at 12:01

## 2018-05-20 RX ADMIN — HYDROMORPHONE HYDROCHLORIDE 1 MG: 1 INJECTION, SOLUTION INTRAMUSCULAR; INTRAVENOUS; SUBCUTANEOUS at 12:25

## 2018-05-20 RX ADMIN — FENTANYL CITRATE 50 MCG: 50 INJECTION, SOLUTION INTRAMUSCULAR; INTRAVENOUS at 05:07

## 2018-05-20 RX ADMIN — ROCURONIUM BROMIDE 20 MG: 10 INJECTION, SOLUTION INTRAVENOUS at 12:53

## 2018-05-20 RX ADMIN — SODIUM CHLORIDE: 9 INJECTION, SOLUTION INTRAVENOUS at 16:31

## 2018-05-20 RX ADMIN — ONDANSETRON 4 MG: 2 INJECTION INTRAMUSCULAR; INTRAVENOUS at 09:33

## 2018-05-20 RX ADMIN — FENTANYL CITRATE 50 MCG: 50 INJECTION, SOLUTION INTRAMUSCULAR; INTRAVENOUS at 07:11

## 2018-05-20 RX ADMIN — KETOROLAC TROMETHAMINE 30 MG: 30 INJECTION, SOLUTION INTRAMUSCULAR; INTRAVENOUS at 12:23

## 2018-05-20 RX ADMIN — Medication 160 MG: at 12:01

## 2018-05-20 RX ADMIN — HYDROMORPHONE HYDROCHLORIDE 1 MG: 1 INJECTION, SOLUTION INTRAMUSCULAR; INTRAVENOUS; SUBCUTANEOUS at 21:13

## 2018-05-20 RX ADMIN — MIDAZOLAM HYDROCHLORIDE 2 MG: 1 INJECTION, SOLUTION INTRAMUSCULAR; INTRAVENOUS at 12:01

## 2018-05-20 RX ADMIN — METOPROLOL TARTRATE 5 MG: 1 INJECTION, SOLUTION INTRAVENOUS at 16:34

## 2018-05-20 RX ADMIN — PROPOFOL 200 MG: 10 INJECTION, EMULSION INTRAVENOUS at 12:01

## 2018-05-20 RX ADMIN — SODIUM CHLORIDE: 9 INJECTION, SOLUTION INTRAVENOUS at 00:47

## 2018-05-20 RX ADMIN — HYDROMORPHONE HYDROCHLORIDE 1 MG: 1 INJECTION, SOLUTION INTRAMUSCULAR; INTRAVENOUS; SUBCUTANEOUS at 17:56

## 2018-05-20 RX ADMIN — LORAZEPAM 1 MG: 2 INJECTION INTRAMUSCULAR at 15:19

## 2018-05-20 RX ADMIN — FENTANYL CITRATE 50 MCG: 50 INJECTION, SOLUTION INTRAMUSCULAR; INTRAVENOUS at 03:04

## 2018-05-20 RX ADMIN — LIDOCAINE HYDROCHLORIDE 100 MG: 20 INJECTION, SOLUTION INFILTRATION; PERINEURAL at 13:25

## 2018-05-20 RX ADMIN — HYDROMORPHONE HYDROCHLORIDE 1 MG: 1 INJECTION, SOLUTION INTRAMUSCULAR; INTRAVENOUS; SUBCUTANEOUS at 12:45

## 2018-05-20 RX ADMIN — LORAZEPAM 1 MG: 2 INJECTION INTRAMUSCULAR at 23:10

## 2018-05-20 RX ADMIN — FENTANYL CITRATE 50 MCG: 50 INJECTION, SOLUTION INTRAMUSCULAR; INTRAVENOUS at 01:02

## 2018-05-20 RX ADMIN — MICONAZOLE NITRATE: 20.6 POWDER TOPICAL at 22:03

## 2018-05-20 RX ADMIN — INSULIN LISPRO 3 UNITS: 100 INJECTION, SOLUTION INTRAVENOUS; SUBCUTANEOUS at 10:20

## 2018-05-20 RX ADMIN — ROCURONIUM BROMIDE 50 MG: 10 INJECTION, SOLUTION INTRAVENOUS at 12:13

## 2018-05-20 RX ADMIN — FENTANYL CITRATE 250 MCG: 50 INJECTION, SOLUTION INTRAMUSCULAR; INTRAVENOUS at 12:01

## 2018-05-20 RX ADMIN — ONDANSETRON 4 MG: 2 INJECTION INTRAMUSCULAR; INTRAVENOUS at 03:04

## 2018-05-20 RX ADMIN — PANTOPRAZOLE SODIUM 40 MG: 40 INJECTION, POWDER, FOR SOLUTION INTRAVENOUS at 22:03

## 2018-05-20 RX ADMIN — HYDROMORPHONE HYDROCHLORIDE 1.5 MG: 2 INJECTION INTRAMUSCULAR; INTRAVENOUS; SUBCUTANEOUS at 15:28

## 2018-05-20 RX ADMIN — PANTOPRAZOLE SODIUM 40 MG: 40 INJECTION, POWDER, FOR SOLUTION INTRAVENOUS at 09:34

## 2018-05-20 RX ADMIN — INSULIN LISPRO 2 UNITS: 100 INJECTION, SOLUTION INTRAVENOUS; SUBCUTANEOUS at 00:44

## 2018-05-20 RX ADMIN — MICONAZOLE NITRATE: 20.6 POWDER TOPICAL at 00:48

## 2018-05-20 RX ADMIN — CHLORASEPTIC 1 SPRAY: 1.5 LIQUID ORAL at 04:01

## 2018-05-20 RX ADMIN — MICONAZOLE NITRATE: 20.6 POWDER TOPICAL at 10:49

## 2018-05-20 RX ADMIN — LORAZEPAM 0.5 MG: 2 INJECTION INTRAMUSCULAR at 10:20

## 2018-05-20 RX ADMIN — INSULIN LISPRO 2 UNITS: 100 INJECTION, SOLUTION INTRAVENOUS; SUBCUTANEOUS at 04:36

## 2018-05-20 RX ADMIN — ENOXAPARIN SODIUM 30 MG: 30 INJECTION SUBCUTANEOUS at 22:03

## 2018-05-20 RX ADMIN — METRONIDAZOLE 500 MG: 500 INJECTION, SOLUTION INTRAVENOUS at 12:14

## 2018-05-20 RX ADMIN — Medication 10 ML: at 22:40

## 2018-05-20 ASSESSMENT — PAIN SCALES - GENERAL
PAINLEVEL_OUTOF10: 4
PAINLEVEL_OUTOF10: 7
PAINLEVEL_OUTOF10: 7
PAINLEVEL_OUTOF10: 8
PAINLEVEL_OUTOF10: 9
PAINLEVEL_OUTOF10: 6
PAINLEVEL_OUTOF10: 10
PAINLEVEL_OUTOF10: 9
PAINLEVEL_OUTOF10: 10
PAINLEVEL_OUTOF10: 6
PAINLEVEL_OUTOF10: 6
PAINLEVEL_OUTOF10: 10

## 2018-05-20 ASSESSMENT — PULMONARY FUNCTION TESTS
PIF_VALUE: 1
PIF_VALUE: 26
PIF_VALUE: 29
PIF_VALUE: 34
PIF_VALUE: 2
PIF_VALUE: 33
PIF_VALUE: 33
PIF_VALUE: 32
PIF_VALUE: 32
PIF_VALUE: 36
PIF_VALUE: 32
PIF_VALUE: 28
PIF_VALUE: 27
PIF_VALUE: 28
PIF_VALUE: 33
PIF_VALUE: 32
PIF_VALUE: 35
PIF_VALUE: 28
PIF_VALUE: 28
PIF_VALUE: 35
PIF_VALUE: 34
PIF_VALUE: 31
PIF_VALUE: 29
PIF_VALUE: 27
PIF_VALUE: 32
PIF_VALUE: 31
PIF_VALUE: 27
PIF_VALUE: 27
PIF_VALUE: 29
PIF_VALUE: 34
PIF_VALUE: 32
PIF_VALUE: 27
PIF_VALUE: 1
PIF_VALUE: 22
PIF_VALUE: 1
PIF_VALUE: 1
PIF_VALUE: 32
PIF_VALUE: 29
PIF_VALUE: 1
PIF_VALUE: 2
PIF_VALUE: 2
PIF_VALUE: 28
PIF_VALUE: 27
PIF_VALUE: 35
PIF_VALUE: 30
PIF_VALUE: 33
PIF_VALUE: 2
PIF_VALUE: 32
PIF_VALUE: 27
PIF_VALUE: 26
PIF_VALUE: 35
PIF_VALUE: 28
PIF_VALUE: 32
PIF_VALUE: 26
PIF_VALUE: 26
PIF_VALUE: 34
PIF_VALUE: 2
PIF_VALUE: 33
PIF_VALUE: 33
PIF_VALUE: 12
PIF_VALUE: 29
PIF_VALUE: 29
PIF_VALUE: 28
PIF_VALUE: 27
PIF_VALUE: 32
PIF_VALUE: 8
PIF_VALUE: 1
PIF_VALUE: 2
PIF_VALUE: 27
PIF_VALUE: 32
PIF_VALUE: 35
PIF_VALUE: 28
PIF_VALUE: 13
PIF_VALUE: 33
PIF_VALUE: 34
PIF_VALUE: 29
PIF_VALUE: 26
PIF_VALUE: 16
PIF_VALUE: 14
PIF_VALUE: 34
PIF_VALUE: 28
PIF_VALUE: 34
PIF_VALUE: 36
PIF_VALUE: 28
PIF_VALUE: 28
PIF_VALUE: 33
PIF_VALUE: 32
PIF_VALUE: 34
PIF_VALUE: 34
PIF_VALUE: 2
PIF_VALUE: 25
PIF_VALUE: 33
PIF_VALUE: 1
PIF_VALUE: 30
PIF_VALUE: 33
PIF_VALUE: 2
PIF_VALUE: 10
PIF_VALUE: 32
PIF_VALUE: 29
PIF_VALUE: 30
PIF_VALUE: 2
PIF_VALUE: 29
PIF_VALUE: 29
PIF_VALUE: 34
PIF_VALUE: 26
PIF_VALUE: 28
PIF_VALUE: 33
PIF_VALUE: 28
PIF_VALUE: 27
PIF_VALUE: 27
PIF_VALUE: 35

## 2018-05-20 ASSESSMENT — PAIN DESCRIPTION - PROGRESSION
CLINICAL_PROGRESSION: NOT CHANGED

## 2018-05-20 ASSESSMENT — PAIN DESCRIPTION - FREQUENCY
FREQUENCY: CONTINUOUS
FREQUENCY: CONTINUOUS

## 2018-05-20 ASSESSMENT — PAIN DESCRIPTION - DESCRIPTORS
DESCRIPTORS: ACHING;DISCOMFORT
DESCRIPTORS: SHARP
DESCRIPTORS: ACHING;DISCOMFORT
DESCRIPTORS: SHARP
DESCRIPTORS: ACHING;DISCOMFORT
DESCRIPTORS: SHARP
DESCRIPTORS: SHARP

## 2018-05-20 ASSESSMENT — PAIN DESCRIPTION - LOCATION
LOCATION: ABDOMEN

## 2018-05-20 ASSESSMENT — ENCOUNTER SYMPTOMS
SINUS PRESSURE: 0
RHINORRHEA: 0
COUGH: 0
COLOR CHANGE: 0
WHEEZING: 0
VOMITING: 0
NAUSEA: 0
CONSTIPATION: 0
SORE THROAT: 0
ABDOMINAL PAIN: 0
SHORTNESS OF BREATH: 0
SHORTNESS OF BREATH: 0
DIARRHEA: 0

## 2018-05-20 ASSESSMENT — PAIN DESCRIPTION - PAIN TYPE
TYPE: ACUTE PAIN
TYPE: SURGICAL PAIN
TYPE: SURGICAL PAIN
TYPE: ACUTE PAIN
TYPE: SURGICAL PAIN

## 2018-05-20 ASSESSMENT — PAIN DESCRIPTION - ONSET
ONSET: ON-GOING

## 2018-05-20 ASSESSMENT — PAIN DESCRIPTION - ORIENTATION: ORIENTATION: UPPER

## 2018-05-21 PROBLEM — I10 ESSENTIAL HYPERTENSION: Chronic | Status: ACTIVE | Noted: 2018-05-21

## 2018-05-21 LAB
ANION GAP SERPL CALCULATED.3IONS-SCNC: 8 MMOL/L (ref 9–17)
BUN BLDV-MCNC: 16 MG/DL (ref 6–20)
BUN/CREAT BLD: 20 (ref 9–20)
CALCIUM SERPL-MCNC: 8.1 MG/DL (ref 8.6–10.4)
CHLORIDE BLD-SCNC: 99 MMOL/L (ref 98–107)
CO2: 30 MMOL/L (ref 20–31)
CREAT SERPL-MCNC: 0.79 MG/DL (ref 0.7–1.2)
GFR AFRICAN AMERICAN: >60 ML/MIN
GFR NON-AFRICAN AMERICAN: >60 ML/MIN
GFR SERPL CREATININE-BSD FRML MDRD: ABNORMAL ML/MIN/{1.73_M2}
GFR SERPL CREATININE-BSD FRML MDRD: ABNORMAL ML/MIN/{1.73_M2}
GLUCOSE BLD-MCNC: 175 MG/DL (ref 70–99)
GLUCOSE BLD-MCNC: 175 MG/DL (ref 75–110)
GLUCOSE BLD-MCNC: 179 MG/DL (ref 75–110)
GLUCOSE BLD-MCNC: 179 MG/DL (ref 75–110)
GLUCOSE BLD-MCNC: 203 MG/DL (ref 75–110)
HCT VFR BLD CALC: 37.7 % (ref 41–53)
HEMOGLOBIN: 12.4 G/DL (ref 13.5–17.5)
MCH RBC QN AUTO: 30.4 PG (ref 26–34)
MCHC RBC AUTO-ENTMCNC: 32.9 G/DL (ref 31–37)
MCV RBC AUTO: 92.4 FL (ref 80–100)
NRBC AUTOMATED: ABNORMAL PER 100 WBC
PDW BLD-RTO: 15.3 % (ref 11.5–14.5)
PLATELET # BLD: 195 K/UL (ref 130–400)
PMV BLD AUTO: 7.6 FL (ref 6–12)
POTASSIUM SERPL-SCNC: 4.5 MMOL/L (ref 3.7–5.3)
RBC # BLD: 4.08 M/UL (ref 4.5–5.9)
SODIUM BLD-SCNC: 137 MMOL/L (ref 135–144)
WBC # BLD: 6.2 K/UL (ref 3.5–11)

## 2018-05-21 PROCEDURE — 85027 COMPLETE CBC AUTOMATED: CPT

## 2018-05-21 PROCEDURE — C9113 INJ PANTOPRAZOLE SODIUM, VIA: HCPCS | Performed by: INTERNAL MEDICINE

## 2018-05-21 PROCEDURE — 6370000000 HC RX 637 (ALT 250 FOR IP): Performed by: INTERNAL MEDICINE

## 2018-05-21 PROCEDURE — 82947 ASSAY GLUCOSE BLOOD QUANT: CPT

## 2018-05-21 PROCEDURE — 6360000002 HC RX W HCPCS: Performed by: SURGERY

## 2018-05-21 PROCEDURE — 2580000003 HC RX 258: Performed by: SURGERY

## 2018-05-21 PROCEDURE — 2580000003 HC RX 258: Performed by: INTERNAL MEDICINE

## 2018-05-21 PROCEDURE — 6360000002 HC RX W HCPCS: Performed by: NURSE PRACTITIONER

## 2018-05-21 PROCEDURE — 80048 BASIC METABOLIC PNL TOTAL CA: CPT

## 2018-05-21 PROCEDURE — 2580000003 HC RX 258: Performed by: NURSE PRACTITIONER

## 2018-05-21 PROCEDURE — 2500000003 HC RX 250 WO HCPCS: Performed by: INTERNAL MEDICINE

## 2018-05-21 PROCEDURE — 6370000000 HC RX 637 (ALT 250 FOR IP): Performed by: SURGERY

## 2018-05-21 PROCEDURE — 1200000000 HC SEMI PRIVATE

## 2018-05-21 PROCEDURE — 99232 SBSQ HOSP IP/OBS MODERATE 35: CPT | Performed by: INTERNAL MEDICINE

## 2018-05-21 PROCEDURE — 36415 COLL VENOUS BLD VENIPUNCTURE: CPT

## 2018-05-21 PROCEDURE — 6360000002 HC RX W HCPCS: Performed by: INTERNAL MEDICINE

## 2018-05-21 RX ORDER — OXYCODONE HYDROCHLORIDE AND ACETAMINOPHEN 5; 325 MG/1; MG/1
1 TABLET ORAL EVERY 6 HOURS PRN
Status: DISCONTINUED | OUTPATIENT
Start: 2018-05-21 | End: 2018-05-22 | Stop reason: HOSPADM

## 2018-05-21 RX ORDER — DULOXETIN HYDROCHLORIDE 60 MG/1
60 CAPSULE, DELAYED RELEASE ORAL DAILY
Status: DISCONTINUED | OUTPATIENT
Start: 2018-05-21 | End: 2018-05-22 | Stop reason: HOSPADM

## 2018-05-21 RX ORDER — CLONAZEPAM 0.5 MG/1
1 TABLET ORAL 2 TIMES DAILY
Status: DISCONTINUED | OUTPATIENT
Start: 2018-05-21 | End: 2018-05-22 | Stop reason: HOSPADM

## 2018-05-21 RX ORDER — OXYCODONE HYDROCHLORIDE AND ACETAMINOPHEN 5; 325 MG/1; MG/1
2 TABLET ORAL EVERY 6 HOURS PRN
Status: DISCONTINUED | OUTPATIENT
Start: 2018-05-21 | End: 2018-05-22 | Stop reason: HOSPADM

## 2018-05-21 RX ORDER — DEXTROSE, SODIUM CHLORIDE, SODIUM LACTATE, POTASSIUM CHLORIDE, AND CALCIUM CHLORIDE 5; .6; .31; .03; .02 G/100ML; G/100ML; G/100ML; G/100ML; G/100ML
INJECTION, SOLUTION INTRAVENOUS CONTINUOUS
Status: DISCONTINUED | OUTPATIENT
Start: 2018-05-21 | End: 2018-05-22 | Stop reason: HOSPADM

## 2018-05-21 RX ORDER — TAMSULOSIN HYDROCHLORIDE 0.4 MG/1
0.4 CAPSULE ORAL DAILY
Status: DISCONTINUED | OUTPATIENT
Start: 2018-05-21 | End: 2018-05-22 | Stop reason: HOSPADM

## 2018-05-21 RX ORDER — LEVOTHYROXINE SODIUM 0.15 MG/1
150 TABLET ORAL DAILY
Status: DISCONTINUED | OUTPATIENT
Start: 2018-05-21 | End: 2018-05-22 | Stop reason: HOSPADM

## 2018-05-21 RX ORDER — VENLAFAXINE HYDROCHLORIDE 75 MG/1
150 CAPSULE, EXTENDED RELEASE ORAL
Status: DISCONTINUED | OUTPATIENT
Start: 2018-05-22 | End: 2018-05-22 | Stop reason: HOSPADM

## 2018-05-21 RX ADMIN — INSULIN LISPRO 3 UNITS: 100 INJECTION, SOLUTION INTRAVENOUS; SUBCUTANEOUS at 07:54

## 2018-05-21 RX ADMIN — LORAZEPAM 1 MG: 2 INJECTION INTRAMUSCULAR at 14:09

## 2018-05-21 RX ADMIN — Medication 10 ML: at 20:08

## 2018-05-21 RX ADMIN — METOPROLOL TARTRATE 5 MG: 1 INJECTION, SOLUTION INTRAVENOUS at 15:53

## 2018-05-21 RX ADMIN — SODIUM CHLORIDE, SODIUM LACTATE, POTASSIUM CHLORIDE, CALCIUM CHLORIDE AND DEXTROSE MONOHYDRATE: 5; 600; 310; 30; 20 INJECTION, SOLUTION INTRAVENOUS at 23:00

## 2018-05-21 RX ADMIN — PANTOPRAZOLE SODIUM 40 MG: 40 INJECTION, POWDER, FOR SOLUTION INTRAVENOUS at 07:53

## 2018-05-21 RX ADMIN — ONDANSETRON 4 MG: 2 INJECTION INTRAMUSCULAR; INTRAVENOUS at 20:05

## 2018-05-21 RX ADMIN — ONDANSETRON 4 MG: 2 INJECTION INTRAMUSCULAR; INTRAVENOUS at 07:20

## 2018-05-21 RX ADMIN — HYDROMORPHONE HYDROCHLORIDE 1 MG: 1 INJECTION, SOLUTION INTRAMUSCULAR; INTRAVENOUS; SUBCUTANEOUS at 02:06

## 2018-05-21 RX ADMIN — ENOXAPARIN SODIUM 30 MG: 30 INJECTION SUBCUTANEOUS at 07:54

## 2018-05-21 RX ADMIN — PANTOPRAZOLE SODIUM 40 MG: 40 INJECTION, POWDER, FOR SOLUTION INTRAVENOUS at 20:05

## 2018-05-21 RX ADMIN — CLONAZEPAM 1 MG: 0.5 TABLET ORAL at 11:13

## 2018-05-21 RX ADMIN — MICONAZOLE NITRATE: 20.6 POWDER TOPICAL at 23:00

## 2018-05-21 RX ADMIN — Medication 10 ML: at 07:54

## 2018-05-21 RX ADMIN — OXYCODONE HYDROCHLORIDE AND ACETAMINOPHEN 2 TABLET: 5; 325 TABLET ORAL at 15:53

## 2018-05-21 RX ADMIN — TAMSULOSIN HYDROCHLORIDE 0.4 MG: 0.4 CAPSULE ORAL at 11:13

## 2018-05-21 RX ADMIN — ENOXAPARIN SODIUM 40 MG: 40 INJECTION SUBCUTANEOUS at 20:05

## 2018-05-21 RX ADMIN — METOPROLOL TARTRATE 5 MG: 1 INJECTION, SOLUTION INTRAVENOUS at 03:58

## 2018-05-21 RX ADMIN — LORAZEPAM 1 MG: 2 INJECTION INTRAMUSCULAR at 07:20

## 2018-05-21 RX ADMIN — MICONAZOLE NITRATE: 20.6 POWDER TOPICAL at 08:04

## 2018-05-21 RX ADMIN — INSULIN LISPRO 3 UNITS: 100 INJECTION, SOLUTION INTRAVENOUS; SUBCUTANEOUS at 12:23

## 2018-05-21 RX ADMIN — Medication 10 ML: at 20:50

## 2018-05-21 RX ADMIN — INSULIN LISPRO 6 UNITS: 100 INJECTION, SOLUTION INTRAVENOUS; SUBCUTANEOUS at 16:52

## 2018-05-21 RX ADMIN — INSULIN LISPRO 2 UNITS: 100 INJECTION, SOLUTION INTRAVENOUS; SUBCUTANEOUS at 23:00

## 2018-05-21 RX ADMIN — DULOXETINE HYDROCHLORIDE 60 MG: 60 CAPSULE, DELAYED RELEASE ORAL at 11:13

## 2018-05-21 RX ADMIN — METOPROLOL TARTRATE 5 MG: 1 INJECTION, SOLUTION INTRAVENOUS at 23:00

## 2018-05-21 RX ADMIN — LORAZEPAM 1 MG: 2 INJECTION INTRAMUSCULAR at 20:50

## 2018-05-21 RX ADMIN — LEVOTHYROXINE SODIUM 150 MCG: 150 TABLET ORAL at 14:09

## 2018-05-21 RX ADMIN — METOPROLOL TARTRATE 5 MG: 1 INJECTION, SOLUTION INTRAVENOUS at 10:19

## 2018-05-21 RX ADMIN — SODIUM CHLORIDE: 9 INJECTION, SOLUTION INTRAVENOUS at 02:06

## 2018-05-21 ASSESSMENT — PAIN DESCRIPTION - PROGRESSION

## 2018-05-21 ASSESSMENT — PAIN DESCRIPTION - PAIN TYPE
TYPE: ACUTE PAIN
TYPE: SURGICAL PAIN

## 2018-05-21 ASSESSMENT — PAIN DESCRIPTION - ORIENTATION
ORIENTATION: MID
ORIENTATION: MID

## 2018-05-21 ASSESSMENT — PAIN DESCRIPTION - ONSET: ONSET: ON-GOING

## 2018-05-21 ASSESSMENT — PAIN SCALES - GENERAL
PAINLEVEL_OUTOF10: 7
PAINLEVEL_OUTOF10: 7
PAINLEVEL_OUTOF10: 5
PAINLEVEL_OUTOF10: 8

## 2018-05-21 ASSESSMENT — PAIN DESCRIPTION - LOCATION
LOCATION: ABDOMEN
LOCATION: ABDOMEN

## 2018-05-21 ASSESSMENT — PAIN DESCRIPTION - FREQUENCY
FREQUENCY: INTERMITTENT
FREQUENCY: CONTINUOUS

## 2018-05-21 ASSESSMENT — PAIN DESCRIPTION - DESCRIPTORS
DESCRIPTORS: ACHING;NAGGING
DESCRIPTORS: DISCOMFORT

## 2018-05-22 ENCOUNTER — APPOINTMENT (OUTPATIENT)
Dept: GENERAL RADIOLOGY | Age: 53
DRG: 354 | End: 2018-05-22
Payer: MEDICARE

## 2018-05-22 VITALS
SYSTOLIC BLOOD PRESSURE: 151 MMHG | OXYGEN SATURATION: 99 % | DIASTOLIC BLOOD PRESSURE: 63 MMHG | HEIGHT: 71 IN | HEART RATE: 74 BPM | BODY MASS INDEX: 44.1 KG/M2 | WEIGHT: 315 LBS | TEMPERATURE: 97.9 F | RESPIRATION RATE: 16 BRPM

## 2018-05-22 LAB
ANION GAP SERPL CALCULATED.3IONS-SCNC: 9 MMOL/L (ref 9–17)
BUN BLDV-MCNC: 13 MG/DL (ref 6–20)
BUN/CREAT BLD: 16 (ref 9–20)
CALCIUM SERPL-MCNC: 8.4 MG/DL (ref 8.6–10.4)
CHLORIDE BLD-SCNC: 97 MMOL/L (ref 98–107)
CO2: 29 MMOL/L (ref 20–31)
CREAT SERPL-MCNC: 0.81 MG/DL (ref 0.7–1.2)
GFR AFRICAN AMERICAN: >60 ML/MIN
GFR NON-AFRICAN AMERICAN: >60 ML/MIN
GFR SERPL CREATININE-BSD FRML MDRD: ABNORMAL ML/MIN/{1.73_M2}
GFR SERPL CREATININE-BSD FRML MDRD: ABNORMAL ML/MIN/{1.73_M2}
GLUCOSE BLD-MCNC: 180 MG/DL (ref 75–110)
GLUCOSE BLD-MCNC: 185 MG/DL (ref 75–110)
GLUCOSE BLD-MCNC: 216 MG/DL (ref 70–99)
HCT VFR BLD CALC: 38.8 % (ref 41–53)
HEMOGLOBIN: 12.5 G/DL (ref 13.5–17.5)
MCH RBC QN AUTO: 29.8 PG (ref 26–34)
MCHC RBC AUTO-ENTMCNC: 32.2 G/DL (ref 31–37)
MCV RBC AUTO: 92.4 FL (ref 80–100)
NRBC AUTOMATED: ABNORMAL PER 100 WBC
PDW BLD-RTO: 14.7 % (ref 11.5–14.5)
PLATELET # BLD: 172 K/UL (ref 130–400)
PMV BLD AUTO: 7.5 FL (ref 6–12)
POTASSIUM SERPL-SCNC: 4.9 MMOL/L (ref 3.7–5.3)
RBC # BLD: 4.2 M/UL (ref 4.5–5.9)
SODIUM BLD-SCNC: 135 MMOL/L (ref 135–144)
SURGICAL PATHOLOGY REPORT: NORMAL
WBC # BLD: 6.3 K/UL (ref 3.5–11)

## 2018-05-22 PROCEDURE — 74019 RADEX ABDOMEN 2 VIEWS: CPT

## 2018-05-22 PROCEDURE — 6360000002 HC RX W HCPCS: Performed by: INTERNAL MEDICINE

## 2018-05-22 PROCEDURE — 6360000002 HC RX W HCPCS: Performed by: SURGERY

## 2018-05-22 PROCEDURE — 36415 COLL VENOUS BLD VENIPUNCTURE: CPT

## 2018-05-22 PROCEDURE — 82947 ASSAY GLUCOSE BLOOD QUANT: CPT

## 2018-05-22 PROCEDURE — 80048 BASIC METABOLIC PNL TOTAL CA: CPT

## 2018-05-22 PROCEDURE — 85027 COMPLETE CBC AUTOMATED: CPT

## 2018-05-22 PROCEDURE — 2500000003 HC RX 250 WO HCPCS: Performed by: INTERNAL MEDICINE

## 2018-05-22 PROCEDURE — 2580000003 HC RX 258: Performed by: INTERNAL MEDICINE

## 2018-05-22 PROCEDURE — 6360000002 HC RX W HCPCS: Performed by: NURSE PRACTITIONER

## 2018-05-22 PROCEDURE — C9113 INJ PANTOPRAZOLE SODIUM, VIA: HCPCS | Performed by: INTERNAL MEDICINE

## 2018-05-22 PROCEDURE — 6370000000 HC RX 637 (ALT 250 FOR IP): Performed by: SURGERY

## 2018-05-22 PROCEDURE — 6370000000 HC RX 637 (ALT 250 FOR IP): Performed by: INTERNAL MEDICINE

## 2018-05-22 PROCEDURE — 2580000003 HC RX 258: Performed by: SURGERY

## 2018-05-22 RX ADMIN — ONDANSETRON 4 MG: 2 INJECTION INTRAMUSCULAR; INTRAVENOUS at 03:27

## 2018-05-22 RX ADMIN — VENLAFAXINE HYDROCHLORIDE 150 MG: 75 CAPSULE, EXTENDED RELEASE ORAL at 09:08

## 2018-05-22 RX ADMIN — DULOXETINE HYDROCHLORIDE 60 MG: 60 CAPSULE, DELAYED RELEASE ORAL at 09:08

## 2018-05-22 RX ADMIN — TAMSULOSIN HYDROCHLORIDE 0.4 MG: 0.4 CAPSULE ORAL at 09:08

## 2018-05-22 RX ADMIN — LORAZEPAM 1 MG: 2 INJECTION INTRAMUSCULAR at 03:27

## 2018-05-22 RX ADMIN — METOPROLOL TARTRATE 5 MG: 1 INJECTION, SOLUTION INTRAVENOUS at 03:27

## 2018-05-22 RX ADMIN — INSULIN LISPRO 3 UNITS: 100 INJECTION, SOLUTION INTRAVENOUS; SUBCUTANEOUS at 09:10

## 2018-05-22 RX ADMIN — PANTOPRAZOLE SODIUM 40 MG: 40 INJECTION, POWDER, FOR SOLUTION INTRAVENOUS at 09:11

## 2018-05-22 RX ADMIN — ENOXAPARIN SODIUM 40 MG: 40 INJECTION SUBCUTANEOUS at 09:09

## 2018-05-22 RX ADMIN — METOPROLOL TARTRATE 5 MG: 1 INJECTION, SOLUTION INTRAVENOUS at 09:09

## 2018-05-22 RX ADMIN — CLONAZEPAM 1 MG: 0.5 TABLET ORAL at 09:33

## 2018-05-22 RX ADMIN — SODIUM CHLORIDE, SODIUM LACTATE, POTASSIUM CHLORIDE, CALCIUM CHLORIDE AND DEXTROSE MONOHYDRATE: 5; 600; 310; 30; 20 INJECTION, SOLUTION INTRAVENOUS at 09:38

## 2018-05-22 RX ADMIN — Medication 10 ML: at 09:34

## 2018-05-22 RX ADMIN — MICONAZOLE NITRATE: 20.6 POWDER TOPICAL at 09:20

## 2019-02-15 ENCOUNTER — HOSPITAL ENCOUNTER (OUTPATIENT)
Dept: CT IMAGING | Age: 54
Discharge: HOME | End: 2019-02-17
Payer: MEDICARE

## 2019-02-15 DIAGNOSIS — R10.9 RIGHT FLANK PAIN: ICD-10-CM

## 2019-02-15 PROCEDURE — 74176 CT ABD & PELVIS W/O CONTRAST: CPT

## 2019-02-19 ENCOUNTER — ANESTHESIA EVENT (OUTPATIENT)
Dept: OPERATING ROOM | Age: 54
End: 2019-02-19
Payer: MEDICARE

## 2019-02-20 ENCOUNTER — HOSPITAL ENCOUNTER (OUTPATIENT)
Age: 54
Setting detail: OUTPATIENT SURGERY
Discharge: HOME | End: 2019-02-20
Attending: UROLOGY | Admitting: UROLOGY
Payer: MEDICARE

## 2019-02-20 ENCOUNTER — ANESTHESIA (OUTPATIENT)
Dept: OPERATING ROOM | Age: 54
End: 2019-02-20
Payer: MEDICARE

## 2019-02-20 VITALS
TEMPERATURE: 98.1 F | HEIGHT: 70 IN | BODY MASS INDEX: 45.1 KG/M2 | SYSTOLIC BLOOD PRESSURE: 125 MMHG | RESPIRATION RATE: 22 BRPM | WEIGHT: 315 LBS | OXYGEN SATURATION: 96 % | HEART RATE: 73 BPM | DIASTOLIC BLOOD PRESSURE: 90 MMHG

## 2019-02-20 VITALS — SYSTOLIC BLOOD PRESSURE: 143 MMHG | OXYGEN SATURATION: 92 % | DIASTOLIC BLOOD PRESSURE: 69 MMHG

## 2019-02-20 LAB
ANION GAP SERPL CALCULATED.3IONS-SCNC: 12 MMOL/L (ref 9–17)
BUN BLDV-MCNC: 12 MG/DL (ref 6–20)
BUN/CREAT BLD: 14 (ref 9–20)
CALCIUM SERPL-MCNC: 9.5 MG/DL (ref 8.6–10.4)
CHLORIDE BLD-SCNC: 95 MMOL/L (ref 98–107)
CO2: 31 MMOL/L (ref 20–31)
CREAT SERPL-MCNC: 0.85 MG/DL (ref 0.7–1.2)
GFR AFRICAN AMERICAN: >60 ML/MIN
GFR NON-AFRICAN AMERICAN: >60 ML/MIN
GFR SERPL CREATININE-BSD FRML MDRD: ABNORMAL ML/MIN/{1.73_M2}
GFR SERPL CREATININE-BSD FRML MDRD: ABNORMAL ML/MIN/{1.73_M2}
GLUCOSE BLD-MCNC: 75 MG/DL (ref 70–99)
GLUCOSE BLD-MCNC: 84 MG/DL (ref 75–110)
HCT VFR BLD CALC: 42.4 % (ref 41–53)
HEMOGLOBIN: 13.9 G/DL (ref 13.5–17.5)
MCH RBC QN AUTO: 28.6 PG (ref 26–34)
MCHC RBC AUTO-ENTMCNC: 32.6 G/DL (ref 31–37)
MCV RBC AUTO: 87.7 FL (ref 80–100)
NRBC AUTOMATED: NORMAL PER 100 WBC
PDW BLD-RTO: 13.6 % (ref 11.5–14.5)
PLATELET # BLD: 154 K/UL (ref 130–400)
PMV BLD AUTO: 7.6 FL (ref 6–12)
POTASSIUM SERPL-SCNC: 4.1 MMOL/L (ref 3.7–5.3)
RBC # BLD: 4.84 M/UL (ref 4.5–5.9)
SODIUM BLD-SCNC: 138 MMOL/L (ref 135–144)
WBC # BLD: 7.1 K/UL (ref 3.5–11)

## 2019-02-20 PROCEDURE — 3600000012 HC SURGERY LEVEL 2 ADDTL 15MIN: Performed by: UROLOGY

## 2019-02-20 PROCEDURE — 85027 COMPLETE CBC AUTOMATED: CPT

## 2019-02-20 PROCEDURE — 7100000011 HC PHASE II RECOVERY - ADDTL 15 MIN: Performed by: UROLOGY

## 2019-02-20 PROCEDURE — 6360000002 HC RX W HCPCS: Performed by: UROLOGY

## 2019-02-20 PROCEDURE — 2580000003 HC RX 258: Performed by: UROLOGY

## 2019-02-20 PROCEDURE — 6370000000 HC RX 637 (ALT 250 FOR IP): Performed by: UROLOGY

## 2019-02-20 PROCEDURE — 2709999900 HC NON-CHARGEABLE SUPPLY: Performed by: UROLOGY

## 2019-02-20 PROCEDURE — 80048 BASIC METABOLIC PNL TOTAL CA: CPT

## 2019-02-20 PROCEDURE — 2580000003 HC RX 258: Performed by: ANESTHESIOLOGY

## 2019-02-20 PROCEDURE — 3600000002 HC SURGERY LEVEL 2 BASE: Performed by: UROLOGY

## 2019-02-20 PROCEDURE — 3700000000 HC ANESTHESIA ATTENDED CARE: Performed by: UROLOGY

## 2019-02-20 PROCEDURE — 82947 ASSAY GLUCOSE BLOOD QUANT: CPT

## 2019-02-20 PROCEDURE — 7100000010 HC PHASE II RECOVERY - FIRST 15 MIN: Performed by: UROLOGY

## 2019-02-20 PROCEDURE — 3700000001 HC ADD 15 MINUTES (ANESTHESIA): Performed by: UROLOGY

## 2019-02-20 PROCEDURE — 6360000002 HC RX W HCPCS: Performed by: NURSE ANESTHETIST, CERTIFIED REGISTERED

## 2019-02-20 RX ORDER — LIDOCAINE HYDROCHLORIDE 10 MG/ML
1 INJECTION, SOLUTION EPIDURAL; INFILTRATION; INTRACAUDAL; PERINEURAL
Status: DISCONTINUED | OUTPATIENT
Start: 2019-02-21 | End: 2019-02-20 | Stop reason: HOSPADM

## 2019-02-20 RX ORDER — FENTANYL CITRATE 50 UG/ML
INJECTION, SOLUTION INTRAMUSCULAR; INTRAVENOUS PRN
Status: DISCONTINUED | OUTPATIENT
Start: 2019-02-20 | End: 2019-02-20 | Stop reason: SDUPTHER

## 2019-02-20 RX ORDER — OXYCODONE HYDROCHLORIDE AND ACETAMINOPHEN 5; 325 MG/1; MG/1
1 TABLET ORAL PRN
Status: DISCONTINUED | OUTPATIENT
Start: 2019-02-20 | End: 2019-02-20 | Stop reason: HOSPADM

## 2019-02-20 RX ORDER — HYDRALAZINE HYDROCHLORIDE 20 MG/ML
5 INJECTION INTRAMUSCULAR; INTRAVENOUS EVERY 10 MIN PRN
Status: DISCONTINUED | OUTPATIENT
Start: 2019-02-20 | End: 2019-02-20 | Stop reason: HOSPADM

## 2019-02-20 RX ORDER — LABETALOL HYDROCHLORIDE 5 MG/ML
5 INJECTION, SOLUTION INTRAVENOUS EVERY 10 MIN PRN
Status: DISCONTINUED | OUTPATIENT
Start: 2019-02-20 | End: 2019-02-20 | Stop reason: HOSPADM

## 2019-02-20 RX ORDER — SODIUM CHLORIDE, SODIUM LACTATE, POTASSIUM CHLORIDE, CALCIUM CHLORIDE 600; 310; 30; 20 MG/100ML; MG/100ML; MG/100ML; MG/100ML
INJECTION, SOLUTION INTRAVENOUS CONTINUOUS
Status: DISCONTINUED | OUTPATIENT
Start: 2019-02-21 | End: 2019-02-20 | Stop reason: HOSPADM

## 2019-02-20 RX ORDER — FENTANYL CITRATE 50 UG/ML
25 INJECTION, SOLUTION INTRAMUSCULAR; INTRAVENOUS EVERY 5 MIN PRN
Status: DISCONTINUED | OUTPATIENT
Start: 2019-02-20 | End: 2019-02-20 | Stop reason: HOSPADM

## 2019-02-20 RX ORDER — OXYCODONE HYDROCHLORIDE AND ACETAMINOPHEN 5; 325 MG/1; MG/1
2 TABLET ORAL PRN
Status: DISCONTINUED | OUTPATIENT
Start: 2019-02-20 | End: 2019-02-20 | Stop reason: HOSPADM

## 2019-02-20 RX ORDER — ONDANSETRON 2 MG/ML
INJECTION INTRAMUSCULAR; INTRAVENOUS PRN
Status: DISCONTINUED | OUTPATIENT
Start: 2019-02-20 | End: 2019-02-20 | Stop reason: SDUPTHER

## 2019-02-20 RX ORDER — PROMETHAZINE HYDROCHLORIDE 25 MG/ML
6.25 INJECTION, SOLUTION INTRAMUSCULAR; INTRAVENOUS
Status: DISCONTINUED | OUTPATIENT
Start: 2019-02-20 | End: 2019-02-20 | Stop reason: HOSPADM

## 2019-02-20 RX ORDER — CEPHALEXIN 500 MG/1
500 CAPSULE ORAL 3 TIMES DAILY
Qty: 15 CAPSULE | Refills: 0 | Status: SHIPPED | OUTPATIENT
Start: 2019-02-20 | End: 2019-02-25

## 2019-02-20 RX ORDER — ONDANSETRON 2 MG/ML
4 INJECTION INTRAMUSCULAR; INTRAVENOUS
Status: DISCONTINUED | OUTPATIENT
Start: 2019-02-20 | End: 2019-02-20 | Stop reason: HOSPADM

## 2019-02-20 RX ORDER — HYDROMORPHONE HCL 110MG/55ML
0.5 PATIENT CONTROLLED ANALGESIA SYRINGE INTRAVENOUS EVERY 5 MIN PRN
Status: DISCONTINUED | OUTPATIENT
Start: 2019-02-20 | End: 2019-02-20 | Stop reason: HOSPADM

## 2019-02-20 RX ORDER — PROPOFOL 10 MG/ML
INJECTION, EMULSION INTRAVENOUS PRN
Status: DISCONTINUED | OUTPATIENT
Start: 2019-02-20 | End: 2019-02-20 | Stop reason: SDUPTHER

## 2019-02-20 RX ADMIN — PROPOFOL 20 MG: 10 INJECTION, EMULSION INTRAVENOUS at 12:26

## 2019-02-20 RX ADMIN — PROPOFOL 30 MG: 10 INJECTION, EMULSION INTRAVENOUS at 12:31

## 2019-02-20 RX ADMIN — PROPOFOL 10 MG: 10 INJECTION, EMULSION INTRAVENOUS at 12:50

## 2019-02-20 RX ADMIN — DEXTROSE MONOHYDRATE 3 G: 50 INJECTION, SOLUTION INTRAVENOUS at 12:30

## 2019-02-20 RX ADMIN — PROPOFOL 10 MG: 10 INJECTION, EMULSION INTRAVENOUS at 12:40

## 2019-02-20 RX ADMIN — PROPOFOL 10 MG: 10 INJECTION, EMULSION INTRAVENOUS at 12:48

## 2019-02-20 RX ADMIN — PROPOFOL 30 MG: 10 INJECTION, EMULSION INTRAVENOUS at 12:33

## 2019-02-20 RX ADMIN — PROPOFOL 10 MG: 10 INJECTION, EMULSION INTRAVENOUS at 12:52

## 2019-02-20 RX ADMIN — PROPOFOL 20 MG: 10 INJECTION, EMULSION INTRAVENOUS at 12:25

## 2019-02-20 RX ADMIN — PROPOFOL 20 MG: 10 INJECTION, EMULSION INTRAVENOUS at 12:29

## 2019-02-20 RX ADMIN — PROPOFOL 20 MG: 10 INJECTION, EMULSION INTRAVENOUS at 12:35

## 2019-02-20 RX ADMIN — FENTANYL CITRATE 25 MCG: 50 INJECTION, SOLUTION INTRAMUSCULAR; INTRAVENOUS at 12:38

## 2019-02-20 RX ADMIN — PROPOFOL 30 MG: 10 INJECTION, EMULSION INTRAVENOUS at 12:24

## 2019-02-20 RX ADMIN — PROPOFOL 20 MG: 10 INJECTION, EMULSION INTRAVENOUS at 12:37

## 2019-02-20 RX ADMIN — PROPOFOL 10 MG: 10 INJECTION, EMULSION INTRAVENOUS at 12:46

## 2019-02-20 RX ADMIN — ONDANSETRON 4 MG: 2 INJECTION, SOLUTION INTRAMUSCULAR; INTRAVENOUS at 12:45

## 2019-02-20 RX ADMIN — PROPOFOL 10 MG: 10 INJECTION, EMULSION INTRAVENOUS at 12:42

## 2019-02-20 RX ADMIN — PROPOFOL 10 MG: 10 INJECTION, EMULSION INTRAVENOUS at 12:44

## 2019-02-20 RX ADMIN — SODIUM CHLORIDE, POTASSIUM CHLORIDE, SODIUM LACTATE AND CALCIUM CHLORIDE: 600; 310; 30; 20 INJECTION, SOLUTION INTRAVENOUS at 10:40

## 2019-02-20 RX ADMIN — FENTANYL CITRATE 50 MCG: 50 INJECTION, SOLUTION INTRAMUSCULAR; INTRAVENOUS at 12:24

## 2019-02-20 ASSESSMENT — PAIN SCALES - GENERAL
PAINLEVEL_OUTOF10: 2
PAINLEVEL_OUTOF10: 0
PAINLEVEL_OUTOF10: 0

## 2019-02-20 ASSESSMENT — PAIN DESCRIPTION - FREQUENCY: FREQUENCY: CONTINUOUS

## 2019-02-20 ASSESSMENT — PULMONARY FUNCTION TESTS
PIF_VALUE: 1
PIF_VALUE: 0
PIF_VALUE: 1
PIF_VALUE: 0
PIF_VALUE: 1
PIF_VALUE: 0
PIF_VALUE: 0
PIF_VALUE: 1
PIF_VALUE: 17
PIF_VALUE: 1
PIF_VALUE: 0
PIF_VALUE: 0
PIF_VALUE: 1
PIF_VALUE: 0
PIF_VALUE: 1
PIF_VALUE: 0
PIF_VALUE: 1
PIF_VALUE: 1
PIF_VALUE: 31
PIF_VALUE: 1
PIF_VALUE: 1

## 2019-02-20 ASSESSMENT — PAIN DESCRIPTION - PAIN TYPE: TYPE: SURGICAL PAIN

## 2019-02-20 ASSESSMENT — PAIN DESCRIPTION - LOCATION: LOCATION: PENIS

## 2019-02-20 ASSESSMENT — PAIN DESCRIPTION - DESCRIPTORS: DESCRIPTORS: BURNING

## 2019-02-20 ASSESSMENT — PAIN DESCRIPTION - ONSET: ONSET: GRADUAL

## 2019-03-14 ENCOUNTER — HOSPITAL ENCOUNTER (OUTPATIENT)
Age: 54
Setting detail: OUTPATIENT SURGERY
Discharge: HOME | End: 2019-03-14
Attending: INTERNAL MEDICINE | Admitting: INTERNAL MEDICINE
Payer: MEDICARE

## 2019-03-14 ENCOUNTER — ANESTHESIA (OUTPATIENT)
Dept: ENDOSCOPY | Age: 54
End: 2019-03-14
Payer: MEDICARE

## 2019-03-14 ENCOUNTER — ANESTHESIA EVENT (OUTPATIENT)
Dept: ENDOSCOPY | Age: 54
End: 2019-03-14
Payer: MEDICARE

## 2019-03-14 VITALS
HEART RATE: 69 BPM | OXYGEN SATURATION: 96 % | DIASTOLIC BLOOD PRESSURE: 69 MMHG | RESPIRATION RATE: 18 BRPM | HEIGHT: 71 IN | TEMPERATURE: 98.5 F | WEIGHT: 315 LBS | BODY MASS INDEX: 44.1 KG/M2 | SYSTOLIC BLOOD PRESSURE: 128 MMHG

## 2019-03-14 VITALS
DIASTOLIC BLOOD PRESSURE: 69 MMHG | OXYGEN SATURATION: 95 % | SYSTOLIC BLOOD PRESSURE: 129 MMHG | RESPIRATION RATE: 17 BRPM

## 2019-03-14 PROCEDURE — 3700000001 HC ADD 15 MINUTES (ANESTHESIA): Performed by: INTERNAL MEDICINE

## 2019-03-14 PROCEDURE — C1773 RET DEV, INSERTABLE: HCPCS | Performed by: INTERNAL MEDICINE

## 2019-03-14 PROCEDURE — 2709999900 HC NON-CHARGEABLE SUPPLY: Performed by: INTERNAL MEDICINE

## 2019-03-14 PROCEDURE — 7100000010 HC PHASE II RECOVERY - FIRST 15 MIN: Performed by: INTERNAL MEDICINE

## 2019-03-14 PROCEDURE — 2580000003 HC RX 258: Performed by: NURSE ANESTHETIST, CERTIFIED REGISTERED

## 2019-03-14 PROCEDURE — 7100000011 HC PHASE II RECOVERY - ADDTL 15 MIN: Performed by: INTERNAL MEDICINE

## 2019-03-14 PROCEDURE — 2580000003 HC RX 258: Performed by: INTERNAL MEDICINE

## 2019-03-14 PROCEDURE — 88305 TISSUE EXAM BY PATHOLOGIST: CPT

## 2019-03-14 PROCEDURE — 6360000002 HC RX W HCPCS: Performed by: NURSE ANESTHETIST, CERTIFIED REGISTERED

## 2019-03-14 PROCEDURE — 3609012400 HC EGD TRANSORAL BIOPSY SINGLE/MULTIPLE: Performed by: INTERNAL MEDICINE

## 2019-03-14 PROCEDURE — 2500000003 HC RX 250 WO HCPCS: Performed by: NURSE ANESTHETIST, CERTIFIED REGISTERED

## 2019-03-14 PROCEDURE — 3700000000 HC ANESTHESIA ATTENDED CARE: Performed by: INTERNAL MEDICINE

## 2019-03-14 RX ORDER — SODIUM CHLORIDE 9 MG/ML
INJECTION, SOLUTION INTRAVENOUS ONCE
Status: COMPLETED | OUTPATIENT
Start: 2019-03-14 | End: 2019-03-14

## 2019-03-14 RX ORDER — LIDOCAINE HYDROCHLORIDE 10 MG/ML
INJECTION, SOLUTION EPIDURAL; INFILTRATION; INTRACAUDAL; PERINEURAL PRN
Status: DISCONTINUED | OUTPATIENT
Start: 2019-03-14 | End: 2019-03-14 | Stop reason: SDUPTHER

## 2019-03-14 RX ORDER — PROPOFOL 10 MG/ML
INJECTION, EMULSION INTRAVENOUS PRN
Status: DISCONTINUED | OUTPATIENT
Start: 2019-03-14 | End: 2019-03-14 | Stop reason: SDUPTHER

## 2019-03-14 RX ORDER — SODIUM CHLORIDE 9 MG/ML
INJECTION, SOLUTION INTRAVENOUS CONTINUOUS PRN
Status: DISCONTINUED | OUTPATIENT
Start: 2019-03-14 | End: 2019-03-14 | Stop reason: SDUPTHER

## 2019-03-14 RX ADMIN — SODIUM CHLORIDE: 9 INJECTION, SOLUTION INTRAVENOUS at 10:46

## 2019-03-14 RX ADMIN — LIDOCAINE HYDROCHLORIDE 500 MG: 10 INJECTION, SOLUTION EPIDURAL; INFILTRATION; INTRACAUDAL; PERINEURAL at 11:49

## 2019-03-14 RX ADMIN — PROPOFOL 640 MG: 10 INJECTION, EMULSION INTRAVENOUS at 11:49

## 2019-03-14 RX ADMIN — SODIUM CHLORIDE: 9 INJECTION, SOLUTION INTRAVENOUS at 11:45

## 2019-03-14 ASSESSMENT — PAIN SCALES - GENERAL
PAINLEVEL_OUTOF10: 0

## 2019-03-14 ASSESSMENT — PAIN - FUNCTIONAL ASSESSMENT: PAIN_FUNCTIONAL_ASSESSMENT: 0-10

## 2019-03-15 LAB — SURGICAL PATHOLOGY REPORT: NORMAL

## 2019-04-11 ENCOUNTER — HOSPITAL ENCOUNTER (EMERGENCY)
Age: 54
Discharge: HOME | End: 2019-04-11
Attending: EMERGENCY MEDICINE
Payer: MEDICARE

## 2019-04-11 ENCOUNTER — APPOINTMENT (OUTPATIENT)
Dept: GENERAL RADIOLOGY | Age: 54
End: 2019-04-11
Payer: MEDICARE

## 2019-04-11 VITALS
BODY MASS INDEX: 44.1 KG/M2 | TEMPERATURE: 98.2 F | HEIGHT: 71 IN | DIASTOLIC BLOOD PRESSURE: 71 MMHG | RESPIRATION RATE: 20 BRPM | WEIGHT: 315 LBS | HEART RATE: 93 BPM | SYSTOLIC BLOOD PRESSURE: 177 MMHG | OXYGEN SATURATION: 91 %

## 2019-04-11 DIAGNOSIS — R60.9 PERIPHERAL EDEMA: Primary | ICD-10-CM

## 2019-04-11 LAB
ABSOLUTE EOS #: 0.2 K/UL (ref 0–0.4)
ABSOLUTE IMMATURE GRANULOCYTE: ABNORMAL K/UL (ref 0–0.3)
ABSOLUTE LYMPH #: 0.6 K/UL (ref 1–4.8)
ABSOLUTE MONO #: 0.4 K/UL (ref 0.2–0.8)
ANION GAP SERPL CALCULATED.3IONS-SCNC: 12 MMOL/L (ref 9–17)
BASOPHILS # BLD: 2 % (ref 0–2)
BASOPHILS ABSOLUTE: 0.1 K/UL (ref 0–0.2)
BUN BLDV-MCNC: 9 MG/DL (ref 6–20)
BUN/CREAT BLD: 12 (ref 9–20)
CALCIUM SERPL-MCNC: 8.8 MG/DL (ref 8.6–10.4)
CHLORIDE BLD-SCNC: 99 MMOL/L (ref 98–107)
CO2: 27 MMOL/L (ref 20–31)
CREAT SERPL-MCNC: 0.76 MG/DL (ref 0.7–1.2)
DIFFERENTIAL TYPE: ABNORMAL
EKG ATRIAL RATE: 89 BPM
EKG P AXIS: 43 DEGREES
EKG P-R INTERVAL: 150 MS
EKG Q-T INTERVAL: 388 MS
EKG QRS DURATION: 110 MS
EKG QTC CALCULATION (BAZETT): 472 MS
EKG R AXIS: -30 DEGREES
EKG T AXIS: 35 DEGREES
EKG VENTRICULAR RATE: 89 BPM
EOSINOPHILS RELATIVE PERCENT: 3 % (ref 1–4)
GFR AFRICAN AMERICAN: >60 ML/MIN
GFR NON-AFRICAN AMERICAN: >60 ML/MIN
GFR SERPL CREATININE-BSD FRML MDRD: ABNORMAL ML/MIN/{1.73_M2}
GFR SERPL CREATININE-BSD FRML MDRD: ABNORMAL ML/MIN/{1.73_M2}
GLUCOSE BLD-MCNC: 178 MG/DL (ref 70–99)
HCT VFR BLD CALC: 35.5 % (ref 41–53)
HEMOGLOBIN: 11.9 G/DL (ref 13.5–17.5)
IMMATURE GRANULOCYTES: ABNORMAL %
INR BLD: 1
LYMPHOCYTES # BLD: 11 % (ref 24–44)
MCH RBC QN AUTO: 29.2 PG (ref 26–34)
MCHC RBC AUTO-ENTMCNC: 33.6 G/DL (ref 31–37)
MCV RBC AUTO: 87 FL (ref 80–100)
MONOCYTES # BLD: 7 % (ref 1–7)
MYOGLOBIN: 56 NG/ML (ref 28–72)
MYOGLOBIN: 65 NG/ML (ref 28–72)
NRBC AUTOMATED: ABNORMAL PER 100 WBC
PARTIAL THROMBOPLASTIN TIME: 28.9 SEC (ref 23–31)
PDW BLD-RTO: 14.5 % (ref 11.5–14.5)
PLATELET # BLD: 142 K/UL (ref 130–400)
PLATELET ESTIMATE: ABNORMAL
PMV BLD AUTO: 7.7 FL (ref 6–12)
POTASSIUM SERPL-SCNC: 4 MMOL/L (ref 3.7–5.3)
PROTHROMBIN TIME: 10 SEC (ref 9.7–11.6)
RBC # BLD: 4.08 M/UL (ref 4.5–5.9)
RBC # BLD: ABNORMAL 10*6/UL
SEG NEUTROPHILS: 77 % (ref 36–66)
SEGMENTED NEUTROPHILS ABSOLUTE COUNT: 4.3 K/UL (ref 1.8–7.7)
SODIUM BLD-SCNC: 138 MMOL/L (ref 135–144)
TROPONIN INTERP: NORMAL
TROPONIN INTERP: NORMAL
TROPONIN T: NORMAL NG/ML
TROPONIN T: NORMAL NG/ML
TROPONIN, HIGH SENSITIVITY: 12 NG/L (ref 0–22)
TROPONIN, HIGH SENSITIVITY: 13 NG/L (ref 0–22)
WBC # BLD: 5.6 K/UL (ref 3.5–11)
WBC # BLD: ABNORMAL 10*3/UL

## 2019-04-11 PROCEDURE — 93005 ELECTROCARDIOGRAM TRACING: CPT

## 2019-04-11 PROCEDURE — 99285 EMERGENCY DEPT VISIT HI MDM: CPT

## 2019-04-11 PROCEDURE — 80048 BASIC METABOLIC PNL TOTAL CA: CPT

## 2019-04-11 PROCEDURE — 85025 COMPLETE CBC W/AUTO DIFF WBC: CPT

## 2019-04-11 PROCEDURE — 81003 URINALYSIS AUTO W/O SCOPE: CPT

## 2019-04-11 PROCEDURE — 6370000000 HC RX 637 (ALT 250 FOR IP): Performed by: EMERGENCY MEDICINE

## 2019-04-11 PROCEDURE — 85610 PROTHROMBIN TIME: CPT

## 2019-04-11 PROCEDURE — 85730 THROMBOPLASTIN TIME PARTIAL: CPT

## 2019-04-11 PROCEDURE — 71045 X-RAY EXAM CHEST 1 VIEW: CPT

## 2019-04-11 PROCEDURE — 84484 ASSAY OF TROPONIN QUANT: CPT

## 2019-04-11 PROCEDURE — 83874 ASSAY OF MYOGLOBIN: CPT

## 2019-04-11 RX ORDER — BETHANECHOL CHLORIDE 10 MG/1
10 TABLET ORAL 3 TIMES DAILY
Status: ON HOLD | COMMUNITY
End: 2020-11-06

## 2019-04-11 RX ORDER — ASPIRIN 81 MG/1
324 TABLET, CHEWABLE ORAL ONCE
Status: COMPLETED | OUTPATIENT
Start: 2019-04-11 | End: 2019-04-11

## 2019-04-11 RX ORDER — GLIPIZIDE 10 MG/1
10 TABLET ORAL
COMMUNITY
End: 2019-07-31

## 2019-04-11 RX ORDER — OXYCODONE AND ACETAMINOPHEN 7.5; 325 MG/1; MG/1
1 TABLET ORAL EVERY 6 HOURS PRN
Status: ON HOLD | COMMUNITY
End: 2020-11-10 | Stop reason: SDUPTHER

## 2019-04-11 RX ADMIN — ASPIRIN 81 MG 324 MG: 81 TABLET ORAL at 11:50

## 2019-04-11 ASSESSMENT — ENCOUNTER SYMPTOMS
VOMITING: 0
DIARRHEA: 0
ABDOMINAL PAIN: 0
COLOR CHANGE: 0
COUGH: 0
CONSTIPATION: 0
SHORTNESS OF BREATH: 0
EYE DISCHARGE: 0
FACIAL SWELLING: 0
EYE REDNESS: 0

## 2019-04-11 ASSESSMENT — PAIN DESCRIPTION - LOCATION: LOCATION: LEG

## 2019-04-11 ASSESSMENT — PAIN DESCRIPTION - PAIN TYPE: TYPE: CHRONIC PAIN

## 2019-04-11 ASSESSMENT — PAIN DESCRIPTION - FREQUENCY: FREQUENCY: CONTINUOUS

## 2019-04-11 ASSESSMENT — PAIN SCALES - GENERAL: PAINLEVEL_OUTOF10: 8

## 2019-04-11 ASSESSMENT — PAIN DESCRIPTION - ORIENTATION: ORIENTATION: RIGHT;LEFT

## 2019-04-11 ASSESSMENT — PAIN DESCRIPTION - DESCRIPTORS: DESCRIPTORS: THROBBING

## 2019-04-11 NOTE — ED PROVIDER NOTES
TABLET    Take 40 mg by mouth daily    NITROGLYCERIN (NITROSTAT) 0.4 MG SL TABLET    Place 1 tablet under the tongue every 5 minutes as needed for Chest pain. NYSTATIN (MYCOSTATIN) 307279 UNIT/GM POWDER    Apply topically 2 times daily as needed TO ABDOMINAL FOLDS, GROIN     OXYBUTYNIN (DITROPAN) 5 MG TABLET    Take 1 tablet by mouth 2 times daily    OXYCODONE-ACETAMINOPHEN (PERCOCET) 7.5-325 MG PER TABLET    Take 1 tablet by mouth every 6 hours as needed for Pain. PREGABALIN (LYRICA) 150 MG CAPSULE    Take 150 mg by mouth 2 times daily    QUETIAPINE FUMARATE (SEROQUEL XR) 150 MG TB24    Take 150 mg by mouth nightly. RIFAXIMIN (XIFAXAN) 550 MG TABLET    Take 550 mg by mouth 2 times daily    SITAGLIPTIN (JANUVIA) 100 MG TABLET    Take 1 tablet by mouth daily    SPIRONOLACTONE (ALDACTONE) 25 MG TABLET    Take 75 mg by mouth 2 times daily     TAMSULOSIN (FLOMAX) 0.4 MG CAPSULE    Take 1 capsule by mouth daily    VENLAFAXINE (EFFEXOR-XR) 150 MG XR CAPSULE    Take 150 mg by mouth daily.        PAST MEDICAL HISTORY         Diagnosis Date    Anxiety and depression     Back pain, chronic     BPH (benign prostatic hyperplasia)     [facility] [address]     Diabetes mellitus ([facility] [address]     not checking bloodsugar at home    GERD (gastroesophageal reflux disease)     H/O cardiac catheterization not sure of date    no stents    Hiatal hernia     Hyperlipidemia     not taking any medication    Hypertension     IBS (irritable bowel syndrome)     Neuropathy     feet,toes    On home oxygen therapy     prn    Pancreatitis     x 5 episodes    Primary osteoarthritis of right knee     Thyroid disease        SURGICAL HISTORY           Procedure Laterality Date    ABDOMEN SURGERY      hernia repair x4 (ventral)    COLONOSCOPY      2012    CYSTOSCOPY  01/24/2018    CYSTOSCOPY  02/20/2019    CYSTOSCOPY N/A 2/20/2019    CYSTOSCOPY DILATION performed by Rene Dudley MD at [address], COLON, DIAGNOSTIC      HERNIA REPAIR      INCISIONAL HERNIA REPAIR  05/20/2018    repair and reduction of recurrent incarcerated incisional hernia with mesh    IN CYSTOURETHROSCOPY N/A 1/24/2018    CYSTOSCOPY [place] performed by Taylor Son MD at [address] Bilateral 8/22/2017    FOOT [facility] with bone bx performed by Lizzy Wong DPM at [address] EGD TRANSORAL BIOPSY SINGLE/MULTIPLE N/A 7/19/2017    EGD BIOPSY performed by Tyler Lazaro MD at [address]  07/19/2017    polypectomy    UPPER GASTROINTESTINAL ENDOSCOPY N/A 3/14/2019    EGD BIOPSY performed by Darrian Ring MD at [address] N/A 5/20/2018    REPAIR AND REDUCTION OF RECURRENT INCARCERATED INCISIONAL HERNIA WITH MESH performed by Elian Wall MD at [place] [address]           Adopted: Yes     No family status information on file. SOCIAL HISTORY      reports that he has never smoked. He has never used smokeless tobacco. He reports that he does not drink alcohol or use drugs. REVIEW OF SYSTEMS    (2-9 systems for level 4, 10 or more for level 5)     Review of Systems   Constitutional: Negative for chills, fatigue and fever. HENT: Negative for congestion, ear discharge and facial swelling. Eyes: Negative for discharge and redness. Respiratory: Negative for cough and shortness of breath. Cardiovascular: Positive for chest pain and leg swelling. The leg swelling is chronic   Gastrointestinal: Negative for abdominal pain, constipation, diarrhea and vomiting. Genitourinary: Negative for dysuria and hematuria. Musculoskeletal: Negative for arthralgias. Skin: Negative for color change and rash. Neurological: Negative for syncope, numbness and headaches. Hematological: Negative for adenopathy. Psychiatric/Behavioral: Negative for confusion. The patient is not nervous/anxious. doctor. Treatment diagnosis and follow-up were discussed with the patient. CONSULTS:  None    PROCEDURES:  None    FINAL IMPRESSION      1.  Peripheral edema          DISPOSITION/PLAN   DISPOSITION Decision To Discharge 04/11/2019 02:23:16 PM      PATIENT REFERRED TO:   Cr Machado MD  [address] [address] [phone]    In 4 days      [facility] ED  [address] [facility]  [phone]    If symptoms worsen      DISCHARGE MEDICATIONS:     New Prescriptions    No medications on file         (Please note that portions of this note were completed with a voice recognition program.  Efforts were made to edit the dictations but occasionally words are mis-transcribed.)    Fredrick Mae MD  Attending Emergency Physician           Fredrick Mae MD  04/11/19 [id number]

## 2019-07-31 ENCOUNTER — HOSPITAL ENCOUNTER (OUTPATIENT)
Dept: PREADMISSION TESTING | Age: 54
Discharge: HOME | End: 2019-08-04
Payer: MEDICARE

## 2019-07-31 ENCOUNTER — HOSPITAL ENCOUNTER (OUTPATIENT)
Age: 54
Discharge: HOME | End: 2019-07-31
Payer: COMMERCIAL

## 2019-07-31 VITALS
HEIGHT: 71 IN | DIASTOLIC BLOOD PRESSURE: 83 MMHG | OXYGEN SATURATION: 95 % | HEART RATE: 55 BPM | RESPIRATION RATE: 16 BRPM | SYSTOLIC BLOOD PRESSURE: 129 MMHG | BODY MASS INDEX: 44.1 KG/M2 | WEIGHT: 315 LBS

## 2019-07-31 LAB
AFP: 4 UG/L
ALBUMIN SERPL-MCNC: 4 G/DL (ref 3.5–5.2)
ALBUMIN/GLOBULIN RATIO: ABNORMAL (ref 1–2.5)
ALP BLD-CCNC: 350 U/L (ref 40–129)
ALT SERPL-CCNC: 51 U/L (ref 5–41)
ANION GAP SERPL CALCULATED.3IONS-SCNC: 12 MMOL/L (ref 9–17)
AST SERPL-CCNC: 40 U/L
BILIRUB SERPL-MCNC: 0.54 MG/DL (ref 0.3–1.2)
BUN BLDV-MCNC: 33 MG/DL (ref 6–20)
BUN/CREAT BLD: 28 (ref 9–20)
CALCIUM SERPL-MCNC: 9.7 MG/DL (ref 8.6–10.4)
CHLORIDE BLD-SCNC: 90 MMOL/L (ref 98–107)
CO2: 36 MMOL/L (ref 20–31)
CREAT SERPL-MCNC: 1.2 MG/DL (ref 0.7–1.2)
GFR AFRICAN AMERICAN: >60 ML/MIN
GFR NON-AFRICAN AMERICAN: >60 ML/MIN
GFR SERPL CREATININE-BSD FRML MDRD: ABNORMAL ML/MIN/{1.73_M2}
GFR SERPL CREATININE-BSD FRML MDRD: ABNORMAL ML/MIN/{1.73_M2}
GLUCOSE BLD-MCNC: 94 MG/DL (ref 70–99)
HCT VFR BLD CALC: 44.1 % (ref 40.7–50.3)
HEMOGLOBIN: 13.8 G/DL (ref 13–17)
INR BLD: 1
MCH RBC QN AUTO: 26.7 PG (ref 25.2–33.5)
MCHC RBC AUTO-ENTMCNC: 31.3 G/DL (ref 28.4–34.8)
MCV RBC AUTO: 85.5 FL (ref 82.6–102.9)
NRBC AUTOMATED: 0 PER 100 WBC
PDW BLD-RTO: 13.6 % (ref 11.8–14.4)
PLATELET # BLD: 238 K/UL (ref 138–453)
PMV BLD AUTO: 9.8 FL (ref 8.1–13.5)
POTASSIUM SERPL-SCNC: 4.2 MMOL/L (ref 3.7–5.3)
PROTHROMBIN TIME: 10.5 SEC (ref 9.7–11.6)
RBC # BLD: 5.16 M/UL (ref 4.21–5.77)
SODIUM BLD-SCNC: 138 MMOL/L (ref 135–144)
TOTAL PROTEIN: 8.5 G/DL (ref 6.4–8.3)
WBC # BLD: 9.8 K/UL (ref 3.5–11.3)

## 2019-07-31 PROCEDURE — 36415 COLL VENOUS BLD VENIPUNCTURE: CPT

## 2019-07-31 PROCEDURE — 82105 ALPHA-FETOPROTEIN SERUM: CPT

## 2019-07-31 PROCEDURE — 80053 COMPREHEN METABOLIC PANEL: CPT

## 2019-07-31 PROCEDURE — 85610 PROTHROMBIN TIME: CPT

## 2019-07-31 PROCEDURE — 85027 COMPLETE CBC AUTOMATED: CPT

## 2019-07-31 RX ORDER — LORAZEPAM 1 MG/1
1 TABLET ORAL EVERY 6 HOURS PRN
Status: ON HOLD | COMMUNITY
End: 2020-11-06 | Stop reason: ALTCHOICE

## 2019-07-31 RX ORDER — FLUCONAZOLE 100 MG/1
100 TABLET ORAL DAILY
Status: ON HOLD | COMMUNITY
End: 2020-11-06

## 2019-07-31 NOTE — PRE-PROCEDURE INSTRUCTIONS
unless you received specific permission from your physician. Preoperative Bathing Instructions   Bathe or shower the day of surgery.  Wear clean clothing after bathing.  Shampoo hair before surgery   Keep nails clean    Do not apply alcohol-based hair or skin products,    Do not apply lotion, emollients, cosmetics, perfumes or deodorant the day of surgery.  Do not shave the area of your body where your surgery will be performed unless you receive specific permission from your surgeon. Patient Instructions:    [facility] If you are having any type of anesthesia you are to have nothing to eat or drink after midnight the night before your surgery. This includes gum, mints, water or smoking or chewing tobacco.  The only exception to this is a small sip of water to take with any morning dose of heart, blood pressure, or seizure medications. No alcoholic beverages for 24 hours prior to surgery.  Bring a list of all medications you take, along with the dose of the medications and how often you take it. If more convenient bring the pharmacy bottles in a zip lock bag.  Brush your teeth but do not swallow water.  Bring your eyeglasses and case with you. No contacts are to be worn the day of surgery. You also may bring your hearing aids.  If you are on C-PAP or Bi-PAP at home and plan on staying in the hospital overnight for your surgery please bring the machine with you.  Do not wear any jewelry or body piercings day of surgery. Also, NO lotion, perfume or deodorant to be used the day of surgery. No nail polish on the operative extremity (arm/leg surgeries)     Do not bring any valuables, such as jewelry, cash or credit cards. If you are staying overnight with us, please bring a SMALL bag of personal items.  Please wear loose, comfortable clothing. If you are potentially going to have a cast or brace bring clothing that will fit over them.  In case of illness - If you have cold or flu like symptoms (high fever, runny nose, sore throat, cough, etc.) rash, nausea, vomiting, loose stools, and/or recent contact with someone who has a contagious disease (chicken pox, measles, etc.) Please call your doctor before coming to the hospital.     If your child is having surgery please make arrangements for any other children to be cared for at home on the day of surgery. Other children are not permitted in recovery room and we want you to be able to spend time with the patient. If other arrangements are not available then we suggest that you have a second adult to stay in the waiting room. Day of Surgery/Procedure:    As a patient at [facility] you can expect quality medical and nursing care that is centered on your individual needs. Our goal is to make your surgical experience as comfortable as possible    . Transportation After Your Surgery/Procedure: You will need a friend or family member to drive you home after your procedure. Your  must be 25years of age or older and able to sign off on your discharge instructions. A taxi cab or any other form of public transportation is not acceptable. Your friend or family member must stay at the hospital throughout your procedure. Someone must remain with you for the first 24 hours after your surgery if you receive anesthesia or medication. If you do not have someone to stay with you, your procedure may be cancelled.       If you have any other questions regarding your procedure or the day of surgery, please call [phone]      _________________________  ____________________________  Signature (Patient)              Signature (Provider) & date

## 2019-07-31 NOTE — H&P
7/26/17   Ally Chen DO   nadolol (CORGARD) 40 MG tablet Take 40 mg by mouth daily    Historical Provider, MD   levothyroxine (SYNTHROID) 150 MCG tablet Take 150 mcg by mouth Daily    Historical Provider, MD   nystatin (MYCOSTATIN) 390675 UNIT/GM powder Apply topically 2 times daily as needed TO ABDOMINAL FOLDS, GROIN     Historical Provider, MD   rifaximin (XIFAXAN) 550 MG tablet Take 550 mg by mouth 2 times daily    Historical Provider, MD   esomeprazole (NEXIUM) 40 MG capsule Take 40 mg by mouth 2 times daily     Historical Provider, MD   DULoxetine (CYMBALTA) 60 MG capsule Take 60 mg by mouth every morning     Historical Provider, MD   pregabalin (LYRICA) 150 MG capsule Take 150 mg by mouth 2 times daily    Historical Provider, MD   clonazePAM (KLONOPIN) 1 MG tablet Take 1 mg by mouth 2 times daily as needed    Historical Provider, MD   nitroGLYCERIN (NITROSTAT) 0.4 MG SL tablet Place 1 tablet under the tongue every 5 minutes as needed for Chest pain. 6/21/14   Dez Gibson MD   venlafaxine (EFFEXOR-XR) 150 MG XR capsule Take 150 mg by mouth daily. Historical Provider, MD   QUEtiapine fumarate (SEROQUEL XR) 150 MG TB24 Take 150 mg by mouth nightly. Historical Provider, MD        Allergies:     No known allergies    Social History:     Tobacco:    reports that he has never smoked. He has never used smokeless tobacco.  Alcohol:      reports that he does not drink alcohol. Drug Use:  reports that he does not use drugs. Safety: Pt has a wheel chair and a walker at home. Family History:     Family History   Adopted: Yes       Review of Systems:     Positive and Negative as described in HPI. CONSTITUTIONAL: Negative for fevers, chills, sweats, fatigue, and weight loss. HEENT: Pt wears glasses. Negative for hearing changes, rhinorrhea, and throat pain. RESPIRATORY: Sleep apnea. SOB with exertion. Wheezing. Chest congestion. Denies asthma and COPD.  Negative for current shortness of breath,

## 2019-08-22 ENCOUNTER — ANESTHESIA EVENT (OUTPATIENT)
Dept: OPERATING ROOM | Age: 54
End: 2019-08-22
Payer: MEDICARE

## 2019-08-23 ENCOUNTER — ANESTHESIA (OUTPATIENT)
Dept: OPERATING ROOM | Age: 54
End: 2019-08-23
Payer: MEDICARE

## 2019-08-23 ENCOUNTER — HOSPITAL ENCOUNTER (OUTPATIENT)
Age: 54
Setting detail: OUTPATIENT SURGERY
Discharge: HOME | End: 2019-08-23
Attending: UROLOGY | Admitting: UROLOGY
Payer: MEDICARE

## 2019-08-23 VITALS
HEIGHT: 71 IN | OXYGEN SATURATION: 96 % | DIASTOLIC BLOOD PRESSURE: 61 MMHG | RESPIRATION RATE: 16 BRPM | TEMPERATURE: 97.2 F | HEART RATE: 70 BPM | WEIGHT: 315 LBS | BODY MASS INDEX: 44.1 KG/M2 | SYSTOLIC BLOOD PRESSURE: 116 MMHG

## 2019-08-23 VITALS — SYSTOLIC BLOOD PRESSURE: 93 MMHG | OXYGEN SATURATION: 96 % | DIASTOLIC BLOOD PRESSURE: 49 MMHG

## 2019-08-23 LAB
-: ABNORMAL
AMORPHOUS: ABNORMAL
BACTERIA: ABNORMAL
BILIRUBIN URINE: NEGATIVE
CASTS UA: ABNORMAL /LPF
COLOR: ABNORMAL
COMMENT UA: ABNORMAL
CRYSTALS, UA: ABNORMAL /HPF
EPITHELIAL CELLS UA: ABNORMAL /HPF (ref 0–5)
GLUCOSE BLD-MCNC: 71 MG/DL (ref 75–110)
GLUCOSE BLD-MCNC: 77 MG/DL (ref 75–110)
GLUCOSE URINE: NEGATIVE
KETONES, URINE: NEGATIVE
LEUKOCYTE ESTERASE, URINE: ABNORMAL
MUCUS: ABNORMAL
NITRITE, URINE: NEGATIVE
OTHER OBSERVATIONS UA: ABNORMAL
PH UA: 6 (ref 5–8)
PROTEIN UA: NEGATIVE
RBC UA: ABNORMAL /HPF (ref 0–2)
RENAL EPITHELIAL, UA: ABNORMAL /HPF
SPECIFIC GRAVITY UA: 1.01 (ref 1–1.03)
TRICHOMONAS: ABNORMAL
TURBIDITY: ABNORMAL
URINE HGB: ABNORMAL
UROBILINOGEN, URINE: NORMAL
WBC UA: ABNORMAL /HPF (ref 0–5)
YEAST: ABNORMAL

## 2019-08-23 PROCEDURE — 82947 ASSAY GLUCOSE BLOOD QUANT: CPT

## 2019-08-23 PROCEDURE — 2500000003 HC RX 250 WO HCPCS: Performed by: NURSE ANESTHETIST, CERTIFIED REGISTERED

## 2019-08-23 PROCEDURE — 87086 URINE CULTURE/COLONY COUNT: CPT

## 2019-08-23 PROCEDURE — 2580000003 HC RX 258: Performed by: UROLOGY

## 2019-08-23 PROCEDURE — 3600000012 HC SURGERY LEVEL 2 ADDTL 15MIN: Performed by: UROLOGY

## 2019-08-23 PROCEDURE — 2580000003 HC RX 258: Performed by: ANESTHESIOLOGY

## 2019-08-23 PROCEDURE — 87186 SC STD MICRODIL/AGAR DIL: CPT

## 2019-08-23 PROCEDURE — 87088 URINE BACTERIA CULTURE: CPT

## 2019-08-23 PROCEDURE — 3700000001 HC ADD 15 MINUTES (ANESTHESIA): Performed by: UROLOGY

## 2019-08-23 PROCEDURE — 3600000002 HC SURGERY LEVEL 2 BASE: Performed by: UROLOGY

## 2019-08-23 PROCEDURE — 81001 URINALYSIS AUTO W/SCOPE: CPT

## 2019-08-23 PROCEDURE — 2709999900 HC NON-CHARGEABLE SUPPLY: Performed by: UROLOGY

## 2019-08-23 PROCEDURE — 7100000001 HC PACU RECOVERY - ADDTL 15 MIN: Performed by: UROLOGY

## 2019-08-23 PROCEDURE — 87077 CULTURE AEROBIC IDENTIFY: CPT

## 2019-08-23 PROCEDURE — 6360000002 HC RX W HCPCS: Performed by: NURSE ANESTHETIST, CERTIFIED REGISTERED

## 2019-08-23 PROCEDURE — 6370000000 HC RX 637 (ALT 250 FOR IP): Performed by: UROLOGY

## 2019-08-23 PROCEDURE — 2580000003 HC RX 258: Performed by: NURSE ANESTHETIST, CERTIFIED REGISTERED

## 2019-08-23 PROCEDURE — 7100000000 HC PACU RECOVERY - FIRST 15 MIN: Performed by: UROLOGY

## 2019-08-23 PROCEDURE — 3700000000 HC ANESTHESIA ATTENDED CARE: Performed by: UROLOGY

## 2019-08-23 PROCEDURE — 6360000002 HC RX W HCPCS: Performed by: UROLOGY

## 2019-08-23 RX ORDER — SODIUM CHLORIDE, SODIUM LACTATE, POTASSIUM CHLORIDE, CALCIUM CHLORIDE 600; 310; 30; 20 MG/100ML; MG/100ML; MG/100ML; MG/100ML
INJECTION, SOLUTION INTRAVENOUS CONTINUOUS
Status: DISCONTINUED | OUTPATIENT
Start: 2019-08-24 | End: 2019-08-23 | Stop reason: HOSPADM

## 2019-08-23 RX ORDER — FENTANYL CITRATE 50 UG/ML
50 INJECTION, SOLUTION INTRAMUSCULAR; INTRAVENOUS EVERY 5 MIN PRN
Status: DISCONTINUED | OUTPATIENT
Start: 2019-08-23 | End: 2019-08-23 | Stop reason: HOSPADM

## 2019-08-23 RX ORDER — SODIUM CHLORIDE 0.9 % (FLUSH) 0.9 %
10 SYRINGE (ML) INJECTION EVERY 12 HOURS SCHEDULED
Status: DISCONTINUED | OUTPATIENT
Start: 2019-08-23 | End: 2019-08-23 | Stop reason: HOSPADM

## 2019-08-23 RX ORDER — LIDOCAINE HYDROCHLORIDE 10 MG/ML
1 INJECTION, SOLUTION EPIDURAL; INFILTRATION; INTRACAUDAL; PERINEURAL
Status: DISCONTINUED | OUTPATIENT
Start: 2019-08-24 | End: 2019-08-23 | Stop reason: HOSPADM

## 2019-08-23 RX ORDER — CEPHALEXIN 500 MG/1
500 CAPSULE ORAL 3 TIMES DAILY
Qty: 15 CAPSULE | Refills: 0 | Status: SHIPPED | OUTPATIENT
Start: 2019-08-23 | End: 2019-08-28

## 2019-08-23 RX ORDER — SODIUM CHLORIDE, SODIUM LACTATE, POTASSIUM CHLORIDE, CALCIUM CHLORIDE 600; 310; 30; 20 MG/100ML; MG/100ML; MG/100ML; MG/100ML
INJECTION, SOLUTION INTRAVENOUS CONTINUOUS PRN
Status: DISCONTINUED | OUTPATIENT
Start: 2019-08-23 | End: 2019-08-23 | Stop reason: SDUPTHER

## 2019-08-23 RX ORDER — FENTANYL CITRATE 50 UG/ML
25 INJECTION, SOLUTION INTRAMUSCULAR; INTRAVENOUS EVERY 5 MIN PRN
Status: DISCONTINUED | OUTPATIENT
Start: 2019-08-23 | End: 2019-08-23 | Stop reason: HOSPADM

## 2019-08-23 RX ORDER — LIDOCAINE HYDROCHLORIDE 20 MG/ML
INJECTION, SOLUTION EPIDURAL; INFILTRATION; INTRACAUDAL; PERINEURAL PRN
Status: DISCONTINUED | OUTPATIENT
Start: 2019-08-23 | End: 2019-08-23 | Stop reason: SDUPTHER

## 2019-08-23 RX ORDER — HYDROMORPHONE HCL 110MG/55ML
0.25 PATIENT CONTROLLED ANALGESIA SYRINGE INTRAVENOUS EVERY 5 MIN PRN
Status: DISCONTINUED | OUTPATIENT
Start: 2019-08-23 | End: 2019-08-23 | Stop reason: HOSPADM

## 2019-08-23 RX ORDER — SODIUM CHLORIDE 0.9 % (FLUSH) 0.9 %
10 SYRINGE (ML) INJECTION PRN
Status: DISCONTINUED | OUTPATIENT
Start: 2019-08-23 | End: 2019-08-23 | Stop reason: HOSPADM

## 2019-08-23 RX ORDER — FENTANYL CITRATE 50 UG/ML
INJECTION, SOLUTION INTRAMUSCULAR; INTRAVENOUS PRN
Status: DISCONTINUED | OUTPATIENT
Start: 2019-08-23 | End: 2019-08-23 | Stop reason: SDUPTHER

## 2019-08-23 RX ORDER — ONDANSETRON 2 MG/ML
4 INJECTION INTRAMUSCULAR; INTRAVENOUS
Status: DISCONTINUED | OUTPATIENT
Start: 2019-08-23 | End: 2019-08-23 | Stop reason: HOSPADM

## 2019-08-23 RX ORDER — HYDROMORPHONE HCL 110MG/55ML
0.5 PATIENT CONTROLLED ANALGESIA SYRINGE INTRAVENOUS EVERY 5 MIN PRN
Status: DISCONTINUED | OUTPATIENT
Start: 2019-08-23 | End: 2019-08-23 | Stop reason: HOSPADM

## 2019-08-23 RX ORDER — SODIUM CHLORIDE 9 MG/ML
INJECTION, SOLUTION INTRAVENOUS CONTINUOUS
Status: DISCONTINUED | OUTPATIENT
Start: 2019-08-24 | End: 2019-08-23

## 2019-08-23 RX ORDER — PROPOFOL 10 MG/ML
INJECTION, EMULSION INTRAVENOUS PRN
Status: DISCONTINUED | OUTPATIENT
Start: 2019-08-23 | End: 2019-08-23 | Stop reason: SDUPTHER

## 2019-08-23 RX ORDER — MIDAZOLAM HYDROCHLORIDE 1 MG/ML
INJECTION INTRAMUSCULAR; INTRAVENOUS PRN
Status: DISCONTINUED | OUTPATIENT
Start: 2019-08-23 | End: 2019-08-23 | Stop reason: SDUPTHER

## 2019-08-23 RX ADMIN — CEFAZOLIN SODIUM 3 G: 10 INJECTION, POWDER, FOR SOLUTION INTRAVENOUS at 11:17

## 2019-08-23 RX ADMIN — PROPOFOL 60 MG: 10 INJECTION, EMULSION INTRAVENOUS at 11:19

## 2019-08-23 RX ADMIN — Medication 50 MCG: at 11:14

## 2019-08-23 RX ADMIN — SODIUM CHLORIDE, POTASSIUM CHLORIDE, SODIUM LACTATE AND CALCIUM CHLORIDE: 600; 310; 30; 20 INJECTION, SOLUTION INTRAVENOUS at 11:07

## 2019-08-23 RX ADMIN — MIDAZOLAM 2 MG: 1 INJECTION INTRAMUSCULAR; INTRAVENOUS at 11:08

## 2019-08-23 RX ADMIN — SODIUM CHLORIDE, POTASSIUM CHLORIDE, SODIUM LACTATE AND CALCIUM CHLORIDE: 600; 310; 30; 20 INJECTION, SOLUTION INTRAVENOUS at 10:13

## 2019-08-23 RX ADMIN — PROPOFOL 50 MG: 10 INJECTION, EMULSION INTRAVENOUS at 11:31

## 2019-08-23 RX ADMIN — LIDOCAINE HYDROCHLORIDE 100 MG: 20 INJECTION, SOLUTION EPIDURAL; INFILTRATION; INTRACAUDAL; PERINEURAL at 11:14

## 2019-08-23 RX ADMIN — Medication 25 MCG: at 11:31

## 2019-08-23 RX ADMIN — PROPOFOL 80 MG: 10 INJECTION, EMULSION INTRAVENOUS at 11:14

## 2019-08-23 RX ADMIN — PROPOFOL 50 MG: 10 INJECTION, EMULSION INTRAVENOUS at 11:27

## 2019-08-23 RX ADMIN — PROPOFOL 40 MG: 10 INJECTION, EMULSION INTRAVENOUS at 11:23

## 2019-08-23 RX ADMIN — Medication 25 MCG: at 11:33

## 2019-08-23 ASSESSMENT — PULMONARY FUNCTION TESTS
PIF_VALUE: 1
PIF_VALUE: 0
PIF_VALUE: 1
PIF_VALUE: 0
PIF_VALUE: 1
PIF_VALUE: 1
PIF_VALUE: 0
PIF_VALUE: 1
PIF_VALUE: 0
PIF_VALUE: 1
PIF_VALUE: 1
PIF_VALUE: 0
PIF_VALUE: 0
PIF_VALUE: 1
PIF_VALUE: 0
PIF_VALUE: 1
PIF_VALUE: 0
PIF_VALUE: 1
PIF_VALUE: 1
PIF_VALUE: 0

## 2019-08-23 ASSESSMENT — PAIN SCALES - GENERAL
PAINLEVEL_OUTOF10: 0
PAINLEVEL_OUTOF10: 0

## 2019-08-23 ASSESSMENT — PAIN - FUNCTIONAL ASSESSMENT: PAIN_FUNCTIONAL_ASSESSMENT: 0-10

## 2019-08-23 NOTE — ANESTHESIA PRE PROCEDURE
MG tablet Take 550 mg by mouth 2 times daily   Yes Historical Provider, MD   esomeprazole (NEXIUM) 40 MG capsule Take 40 mg by mouth 2 times daily    Yes Historical Provider, MD   DULoxetine (CYMBALTA) 60 MG capsule Take 60 mg by mouth every morning    Yes Historical Provider, MD   pregabalin (LYRICA) 150 MG capsule Take 150 mg by mouth 2 times daily   Yes Historical Provider, MD   clonazePAM (KLONOPIN) 1 MG tablet Take 1 mg by mouth 2 times daily as needed   Yes Historical Provider, MD   venlafaxine (EFFEXOR-XR) 150 MG XR capsule Take 150 mg by mouth daily. Yes Historical Provider, MD   QUEtiapine fumarate (SEROQUEL XR) 150 MG TB24 Take 150 mg by mouth nightly. Yes Historical Provider, MD   hydrocortisone 2.5 % cream Apply topically as needed Apply topically 2 times daily. Historical Provider, MD   ibuprofen (ADVIL;MOTRIN) 600 MG tablet Take 600 mg by mouth every 6 hours as needed for Pain    Historical Provider, MD   aspirin 81 MG EC tablet Take 1 tablet by mouth daily 7/26/17   Caty Son, DO   nystatin (MYCOSTATIN) 268778 UNIT/GM powder Apply topically 2 times daily as needed TO ABDOMINAL FOLDS, GROIN     Historical Provider, MD   nitroGLYCERIN (NITROSTAT) 0.4 MG SL tablet Place 1 tablet under the tongue every 5 minutes as needed for Chest pain.  6/21/14   Dez Mullen MD       Current medications:    Current Facility-Administered Medications   Medication Dose Route Frequency Provider Last Rate Last Dose    ceFAZolin (ANCEF) 3 g in dextrose 5 % 100 mL IVPB  3 g Intravenous Once Judy Adorno MD        [START ON 8/24/2019] lactated ringers infusion   Intravenous Continuous Honorio Jensen  mL/hr at 08/23/19 1013      sodium chloride flush 0.9 % injection 10 mL  10 mL Intravenous 2 times per day Gaston Simpson,         sodium chloride flush 0.9 % injection 10 mL  10 mL Intravenous PRN Honorio Jensen DO        [START ON 8/24/2019] lidocaine PF 1 % injection 1 mL  1 mL Intradermal Once PRN Cinderella Stefany, DO           Allergies: Allergies   Allergen Reactions    No Known Allergies        Problem List:    Patient Active Problem List   Diagnosis Code    Alcoholic cirrhosis of liver (HCC) K70.30    Peripheral edema R60.9    Bipolar disorder ([facility] F31.9    Diabetes mellitus with neuropathy ([facility] E11.40    Essential hypertension I10    Hyperlipidemia E78.5    Weakness R53.1    Hyponatremia E87.1    FABI (obstructive sleep apnea) G47.33    Primary osteoarthritis of right knee M17.11    Type 2 diabetes mellitus with diabetic neuropathy (HCC) E11.40    Hypothyroidism E03.9    Urine retention R33.9    Anemia D64.9    Cellulitis of right lower extremity L03.115    Gastroesophageal reflux disease K21.9    Slow transit constipation K59.01    Constipation K59.00    Iron deficiency anemia due to chronic blood loss D50.0    Gastroesophageal reflux disease without esophagitis K21.9    Secondary esophageal varices without bleeding ([facility]) I85.10    Portal hypertension (HCC) K76.6    Morbid obesity with BMI of 50.0-59.9, adult ([facility]) E66.01, Z68.43    Venous stasis dermatitis of both lower extremities I87.2    Cellulitis of left lower extremity L03. 116    Cellulitis of perineum L03.315    Epididymoorchitis N45.3    Abdominal pain R10.9    Multiple and open wound of lower limb S81.809A    Scrotal edema N50.89    Retention, urine R33.9    SBO (small bowel obstruction) ([facility] K56.609    Incisional hernia with obstruction but no gangrene K43.0       Past Medical History:        Diagnosis Date    Anxiety and depression     Back pain, chronic     BPH (benign prostatic hyperplasia)     CHF (congestive heart failure) (HCC)     Cirrhosis ([facility]     Diabetes mellitus ([facility]     not checking bloodsugar at home    GERD (gastroesophageal reflux disease)     H/O cardiac catheterization not sure of date    no stents    Hepatitis     Pt does not know the type.      Hiatal hernia     History of alcohol abuse     Sober since 2013    Hyperlipidemia     not taking any medication    Hypertension     IBS (irritable bowel syndrome)     Morbid obesity ([address]     Neuropathy     feet,toes    On home oxygen therapy     prn    Pancreatitis     x 5 episodes    Primary osteoarthritis of right knee     Sleep apnea     No machine    Thyroid disease     Urinary bladder neurogenic dysfunction        Past Surgical History:        Procedure Laterality Date    ABDOMEN SURGERY      hernia repair x4 (ventral)    COLONOSCOPY      2012    CYSTOSCOPY  01/24/2018    CYSTOSCOPY  02/20/2019    CYSTOSCOPY N/A 2/20/2019    CYSTOSCOPY DILATION performed by Luther Weber MD at [facility], [address]  05/20/2018    repair and reduction of recurrent incarcerated incisional hernia with mesh    [place] CYSTOURETHROSCOPY N/A 1/24/2018    CYSTOSCOPY Luana Ilene performed by Luther Weber MD at [address] Bilateral 8/22/2017    FOOT [address] [facility] with bone bx performed by Consuelo Torres DPM at [address] EGD TRANSORAL BIOPSY SINGLE/MULTIPLE N/A 7/19/2017    EGD BIOPSY performed by Jessa Loco MD at [address] [facility]  07/19/2017    polypectomy    UPPER GASTROINTESTINAL ENDOSCOPY N/A 3/14/2019    EGD BIOPSY performed by Yana Treadwell MD at [address] [facility] N/A 5/20/2018    REPAIR AND REDUCTION OF RECURRENT INCARCERATED INCISIONAL HERNIA WITH MESH performed by Virginia Slater MD at [place] [address] History:    Social History     Tobacco Use    Smoking status: Never Smoker    Smokeless tobacco: Never Used   Substance Use Topics    Alcohol use: No     Comment: quit drinking 2012                                Counseling given: Not Answered      Vital Signs (Current):   Vitals:    08/23/19 1000 08/23/19 1006   BP:

## 2019-08-23 NOTE — H&P
(SYNTHROID) 150 MCG tablet Take 150 mcg by mouth Daily      rifaximin (XIFAXAN) 550 MG tablet Take 550 mg by mouth 2 times daily      esomeprazole (NEXIUM) 40 MG capsule Take 40 mg by mouth 2 times daily       DULoxetine (CYMBALTA) 60 MG capsule Take 60 mg by mouth every morning       pregabalin (LYRICA) 150 MG capsule Take 150 mg by mouth 2 times daily      clonazePAM (KLONOPIN) 1 MG tablet Take 1 mg by mouth 2 times daily as needed      venlafaxine (EFFEXOR-XR) 150 MG XR capsule Take 150 mg by mouth daily.  QUEtiapine fumarate (SEROQUEL XR) 150 MG TB24 Take 150 mg by mouth nightly.  ibuprofen (ADVIL;MOTRIN) 600 MG tablet Take 600 mg by mouth every 6 hours as needed for Pain      aspirin 81 MG EC tablet Take 1 tablet by mouth daily 30 tablet 3    nystatin (MYCOSTATIN) 735552 UNIT/GM powder Apply topically 2 times daily as needed TO ABDOMINAL FOLDS, GROIN       nitroGLYCERIN (NITROSTAT) 0.4 MG SL tablet Place 1 tablet under the tongue every 5 minutes as needed for Chest pain. 25 tablet 5            Prior to Admission medications    Medication Sig Start Date End Date Taking? Authorizing Provider   LORazepam (ATIVAN) 1 MG tablet Take 1 mg by mouth every 6 hours as needed for Anxiety. Yes Historical Provider, MD   fluconazole (DIFLUCAN) 100 MG tablet Take 100 mg by mouth daily   Yes Historical Provider, MD   oxyCODONE-acetaminophen (PERCOCET) 7.5-325 MG per tablet Take 1 tablet by mouth every 6 hours as needed for Pain.    Yes Historical Provider, MD   bethanechol (URECHOLINE) 10 MG tablet Take 10 mg by mouth 3 times daily   Yes Historical Provider, MD   Insulin Glargine (BASAGLAR KWIKPEN SC) Inject 80 Units into the skin 2 times daily    Yes Historical Provider, MD   tamsulosin (FLOMAX) 0.4 MG capsule Take 1 capsule by mouth daily 1/24/18  Yes Chi Krishnamurthy MD   hydrOXYzine (ATARAX) 25 MG tablet Take 25 mg by mouth 3 times daily as needed for Itching   Yes Historical Provider, MD   oxybutynin right knee      Sleep apnea       No machine    Thyroid disease      Urinary bladder neurogenic dysfunction              Past Surgical History:      Past Surgical History         Past Surgical History:   Procedure Laterality Date    ABDOMEN SURGERY         hernia repair x4 (ventral)    COLONOSCOPY         2012    CYSTOSCOPY   01/24/2018    CYSTOSCOPY   02/20/2019    CYSTOSCOPY N/A 2/20/2019     CYSTOSCOPY DILATION performed by Eugenia Mac MD at [address], COLON, DIAGNOSTIC       [address]   05/20/2018     repair and reduction of recurrent incarcerated incisional hernia with mesh    [place] CYSTOURETHROSCOPY N/A 1/24/2018     CYSTOSCOPY Alia Fury performed by Eugenia Mac MD at [address] Bilateral 8/22/2017     FOOT [facility] with bone bx performed by Vladimir Hamlin DPM at [address] EGD TRANSORAL BIOPSY SINGLE/MULTIPLE N/A 7/19/2017     EGD BIOPSY performed by Janice Brannon MD at [address]   07/19/2017     polypectomy    UPPER GASTROINTESTINAL ENDOSCOPY N/A 3/14/2019     EGD BIOPSY performed by Angela Julio MD at [address] N/A 5/20/2018     REPAIR AND REDUCTION OF RECURRENT INCARCERATED INCISIONAL HERNIA WITH MESH performed by Lizzy Marcus MD at [address] [facility]            Medications Prior to Admission:      Home Medications           Prior to Admission medications    Medication Sig Start Date End Date Taking? Authorizing Provider   LORazepam (ATIVAN) 1 MG tablet Take 1 mg by mouth every 6 hours as needed for Anxiety. Yes Historical Provider, MD   hydrocortisone 2.5 % cream Apply topically as needed Apply topically 2 times daily.      Yes Historical Provider, MD   fluconazole (DIFLUCAN) 100 MG tablet Take 100 mg by mouth daily     Yes Historical Provider, MD   oxyCODONE-acetaminophen (PERCOCET) 7.5-325 MG per tablet

## 2019-08-26 LAB
CULTURE: ABNORMAL
Lab: ABNORMAL
SPECIMEN DESCRIPTION: ABNORMAL

## 2019-12-18 ENCOUNTER — HOSPITAL ENCOUNTER (OUTPATIENT)
Dept: CT IMAGING | Age: 54
Discharge: HOME | End: 2019-12-20
Payer: MEDICARE

## 2019-12-18 DIAGNOSIS — R10.9 ABDOMINAL PAIN, UNSPECIFIED ABDOMINAL LOCATION: ICD-10-CM

## 2019-12-18 LAB
BUN BLDV-MCNC: 14 MG/DL (ref 6–20)
CREAT SERPL-MCNC: 0.85 MG/DL (ref 0.7–1.2)
GFR AFRICAN AMERICAN: >60 ML/MIN
GFR NON-AFRICAN AMERICAN: >60 ML/MIN
GFR SERPL CREATININE-BSD FRML MDRD: NORMAL ML/MIN/{1.73_M2}
GFR SERPL CREATININE-BSD FRML MDRD: NORMAL ML/MIN/{1.73_M2}

## 2019-12-18 PROCEDURE — 74177 CT ABD & PELVIS W/CONTRAST: CPT

## 2019-12-18 PROCEDURE — 36415 COLL VENOUS BLD VENIPUNCTURE: CPT

## 2019-12-18 PROCEDURE — 6360000004 HC RX CONTRAST MEDICATION: Performed by: FAMILY MEDICINE

## 2019-12-18 PROCEDURE — 82565 ASSAY OF CREATININE: CPT

## 2019-12-18 PROCEDURE — 84520 ASSAY OF UREA NITROGEN: CPT

## 2019-12-18 PROCEDURE — 2580000003 HC RX 258: Performed by: FAMILY MEDICINE

## 2019-12-18 RX ORDER — SODIUM CHLORIDE 0.9 % (FLUSH) 0.9 %
10 SYRINGE (ML) INJECTION PRN
Status: DISCONTINUED | OUTPATIENT
Start: 2019-12-18 | End: 2019-12-21 | Stop reason: HOSPADM

## 2019-12-18 RX ORDER — 0.9 % SODIUM CHLORIDE 0.9 %
80 INTRAVENOUS SOLUTION INTRAVENOUS ONCE
Status: COMPLETED | OUTPATIENT
Start: 2019-12-18 | End: 2019-12-18

## 2019-12-18 RX ADMIN — IOPAMIDOL 75 ML: 755 INJECTION, SOLUTION INTRAVENOUS at 12:02

## 2019-12-18 RX ADMIN — SODIUM CHLORIDE 80 ML: 9 INJECTION, SOLUTION INTRAVENOUS at 12:02

## 2019-12-18 RX ADMIN — Medication 10 ML: at 12:02

## 2019-12-18 RX ADMIN — IOHEXOL 30 ML: 300 INJECTION, SOLUTION INTRAVENOUS at 12:03

## 2020-11-03 PROBLEM — K21.9 GASTROESOPHAGEAL REFLUX DISEASE: Status: RESOLVED | Noted: 2020-11-03 | Resolved: 2020-11-03

## 2020-11-04 ENCOUNTER — HOSPITAL ENCOUNTER (INPATIENT)
Age: 55
LOS: 6 days | Discharge: NURSING, REHAB OR LONG-TERM CARE | DRG: 689 | End: 2020-11-10
Attending: EMERGENCY MEDICINE | Admitting: FAMILY MEDICINE
Payer: MEDICARE

## 2020-11-04 ENCOUNTER — APPOINTMENT (OUTPATIENT)
Dept: GENERAL RADIOLOGY | Age: 55
DRG: 689 | End: 2020-11-04
Payer: MEDICARE

## 2020-11-04 ENCOUNTER — APPOINTMENT (OUTPATIENT)
Dept: CT IMAGING | Age: 55
DRG: 689 | End: 2020-11-04
Payer: MEDICARE

## 2020-11-04 PROBLEM — Z74.09 MUSCULOSKELETAL IMMOBILITY: Status: ACTIVE | Noted: 2020-11-04

## 2020-11-04 PROBLEM — F05 DELIRIUM DUE TO ANOTHER MEDICAL CONDITION: Status: ACTIVE | Noted: 2020-11-04

## 2020-11-04 PROBLEM — Z97.8 CHRONIC INDWELLING FOLEY CATHETER: Chronic | Status: ACTIVE | Noted: 2020-11-04

## 2020-11-04 PROBLEM — T83.511A URINARY TRACT INFECTION ASSOCIATED WITH INDWELLING URETHRAL CATHETER (HCC): Status: ACTIVE | Noted: 2020-11-04

## 2020-11-04 PROBLEM — N39.0 UTI (URINARY TRACT INFECTION): Status: ACTIVE | Noted: 2020-11-04

## 2020-11-04 LAB
-: ABNORMAL
ABSOLUTE EOS #: 0.07 K/UL (ref 0–0.44)
ABSOLUTE IMMATURE GRANULOCYTE: 0.06 K/UL (ref 0–0.3)
ABSOLUTE LYMPH #: 0.79 K/UL (ref 1.1–3.7)
ABSOLUTE MONO #: 0.47 K/UL (ref 0.1–1.2)
ALBUMIN SERPL-MCNC: 3.3 G/DL (ref 3.5–5.2)
ALBUMIN/GLOBULIN RATIO: ABNORMAL (ref 1–2.5)
ALP BLD-CCNC: 197 U/L (ref 40–129)
ALT SERPL-CCNC: 15 U/L (ref 5–41)
AMMONIA: 47 UMOL/L (ref 16–60)
AMORPHOUS: ABNORMAL
ANION GAP SERPL CALCULATED.3IONS-SCNC: 9 MMOL/L (ref 9–17)
AST SERPL-CCNC: 20 U/L
BACTERIA: ABNORMAL
BASOPHILS # BLD: 1 % (ref 0–2)
BASOPHILS ABSOLUTE: 0.04 K/UL (ref 0–0.2)
BILIRUB SERPL-MCNC: 0.77 MG/DL (ref 0.3–1.2)
BILIRUBIN DIRECT: 0.22 MG/DL
BILIRUBIN URINE: NEGATIVE
BILIRUBIN, INDIRECT: 0.55 MG/DL (ref 0–1)
BUN BLDV-MCNC: 13 MG/DL (ref 6–20)
BUN/CREAT BLD: 18 (ref 9–20)
CALCIUM SERPL-MCNC: 9.2 MG/DL (ref 8.6–10.4)
CASTS UA: ABNORMAL /LPF
CHLORIDE BLD-SCNC: 92 MMOL/L (ref 98–107)
CO2: 37 MMOL/L (ref 20–31)
COLOR: YELLOW
COMMENT UA: ABNORMAL
CREAT SERPL-MCNC: 0.71 MG/DL (ref 0.7–1.2)
CRYSTALS, UA: ABNORMAL /HPF
DIFFERENTIAL TYPE: ABNORMAL
EOSINOPHILS RELATIVE PERCENT: 1 % (ref 1–4)
EPITHELIAL CELLS UA: ABNORMAL /HPF (ref 0–5)
FIO2: 2
GFR AFRICAN AMERICAN: >60 ML/MIN
GFR NON-AFRICAN AMERICAN: >60 ML/MIN
GFR SERPL CREATININE-BSD FRML MDRD: ABNORMAL ML/MIN/{1.73_M2}
GFR SERPL CREATININE-BSD FRML MDRD: ABNORMAL ML/MIN/{1.73_M2}
GLOBULIN: ABNORMAL G/DL (ref 1.5–3.8)
GLUCOSE BLD-MCNC: 139 MG/DL (ref 75–110)
GLUCOSE BLD-MCNC: 165 MG/DL (ref 70–99)
GLUCOSE URINE: NEGATIVE
HCT VFR BLD CALC: 40.2 % (ref 40.7–50.3)
HEMOGLOBIN: 12.4 G/DL (ref 13–17)
IMMATURE GRANULOCYTES: 1 %
KETONES, URINE: NEGATIVE
LACTIC ACID: 1.9 MMOL/L (ref 0.5–2.2)
LEUKOCYTE ESTERASE, URINE: ABNORMAL
LYMPHOCYTES # BLD: 12 % (ref 24–43)
MCH RBC QN AUTO: 25.7 PG (ref 25.2–33.5)
MCHC RBC AUTO-ENTMCNC: 30.8 G/DL (ref 28.4–34.8)
MCV RBC AUTO: 83.2 FL (ref 82.6–102.9)
MONOCYTES # BLD: 7 % (ref 3–12)
MUCUS: ABNORMAL
MYOGLOBIN: 64 NG/ML (ref 28–72)
NEGATIVE BASE EXCESS, ART: ABNORMAL (ref 0–2)
NITRITE, URINE: NEGATIVE
NRBC AUTOMATED: 0 PER 100 WBC
O2 DEVICE/FLOW/%: ABNORMAL
OTHER OBSERVATIONS UA: ABNORMAL
PATIENT TEMP: ABNORMAL
PDW BLD-RTO: 14.6 % (ref 11.8–14.4)
PH UA: 8 (ref 5–8)
PLATELET # BLD: 137 K/UL (ref 138–453)
PLATELET ESTIMATE: ABNORMAL
PMV BLD AUTO: 10.4 FL (ref 8.1–13.5)
POC HCO3: 41.2 MMOL/L (ref 22–27)
POC O2 SATURATION: 96 %
POC PCO2 TEMP: ABNORMAL MM HG
POC PCO2: 66 MM HG (ref 32–45)
POC PH TEMP: ABNORMAL
POC PH: 7.4 (ref 7.35–7.45)
POC PO2 TEMP: ABNORMAL MM HG
POC PO2: 88 MM HG (ref 75–95)
POSITIVE BASE EXCESS, ART: 16 (ref 0–2)
POTASSIUM SERPL-SCNC: 3.3 MMOL/L (ref 3.7–5.3)
PROTEIN UA: ABNORMAL
RBC # BLD: 4.83 M/UL (ref 4.21–5.77)
RBC # BLD: ABNORMAL 10*6/UL
RBC UA: ABNORMAL /HPF (ref 0–2)
RENAL EPITHELIAL, UA: ABNORMAL /HPF
SEG NEUTROPHILS: 78 % (ref 36–65)
SEGMENTED NEUTROPHILS ABSOLUTE COUNT: 5.36 K/UL (ref 1.5–8.1)
SODIUM BLD-SCNC: 138 MMOL/L (ref 135–144)
SPECIFIC GRAVITY UA: 1.01 (ref 1–1.03)
TCO2 (CALC), ART: 43 MMOL/L (ref 23–28)
TOTAL CK: 45 U/L (ref 39–308)
TOTAL PROTEIN: 7.7 G/DL (ref 6.4–8.3)
TRICHOMONAS: ABNORMAL
TROPONIN INTERP: NORMAL
TROPONIN T: NORMAL NG/ML
TROPONIN, HIGH SENSITIVITY: 11 NG/L (ref 0–22)
TURBIDITY: ABNORMAL
URINE HGB: ABNORMAL
UROBILINOGEN, URINE: ABNORMAL
WBC # BLD: 6.8 K/UL (ref 3.5–11.3)
WBC # BLD: ABNORMAL 10*3/UL
WBC UA: ABNORMAL /HPF (ref 0–5)
YEAST: ABNORMAL

## 2020-11-04 PROCEDURE — 2060000000 HC ICU INTERMEDIATE R&B

## 2020-11-04 PROCEDURE — APPSS180 APP SPLIT SHARED TIME > 60 MINUTES: Performed by: NURSE PRACTITIONER

## 2020-11-04 PROCEDURE — 87088 URINE BACTERIA CULTURE: CPT

## 2020-11-04 PROCEDURE — 80076 HEPATIC FUNCTION PANEL: CPT

## 2020-11-04 PROCEDURE — 6360000002 HC RX W HCPCS: Performed by: NURSE PRACTITIONER

## 2020-11-04 PROCEDURE — 87086 URINE CULTURE/COLONY COUNT: CPT

## 2020-11-04 PROCEDURE — 81001 URINALYSIS AUTO W/SCOPE: CPT

## 2020-11-04 PROCEDURE — 70450 CT HEAD/BRAIN W/O DYE: CPT

## 2020-11-04 PROCEDURE — 85025 COMPLETE CBC W/AUTO DIFF WBC: CPT

## 2020-11-04 PROCEDURE — 2580000003 HC RX 258: Performed by: NURSE PRACTITIONER

## 2020-11-04 PROCEDURE — 99223 1ST HOSP IP/OBS HIGH 75: CPT | Performed by: INTERNAL MEDICINE

## 2020-11-04 PROCEDURE — 82947 ASSAY GLUCOSE BLOOD QUANT: CPT

## 2020-11-04 PROCEDURE — 96365 THER/PROPH/DIAG IV INF INIT: CPT

## 2020-11-04 PROCEDURE — 82550 ASSAY OF CK (CPK): CPT

## 2020-11-04 PROCEDURE — 99283 EMERGENCY DEPT VISIT LOW MDM: CPT

## 2020-11-04 PROCEDURE — 82803 BLOOD GASES ANY COMBINATION: CPT

## 2020-11-04 PROCEDURE — 71045 X-RAY EXAM CHEST 1 VIEW: CPT

## 2020-11-04 PROCEDURE — 80048 BASIC METABOLIC PNL TOTAL CA: CPT

## 2020-11-04 PROCEDURE — 82140 ASSAY OF AMMONIA: CPT

## 2020-11-04 PROCEDURE — 2500000003 HC RX 250 WO HCPCS: Performed by: NURSE PRACTITIONER

## 2020-11-04 PROCEDURE — 84484 ASSAY OF TROPONIN QUANT: CPT

## 2020-11-04 PROCEDURE — 83605 ASSAY OF LACTIC ACID: CPT

## 2020-11-04 PROCEDURE — 83874 ASSAY OF MYOGLOBIN: CPT

## 2020-11-04 PROCEDURE — 87040 BLOOD CULTURE FOR BACTERIA: CPT

## 2020-11-04 PROCEDURE — 36600 WITHDRAWAL OF ARTERIAL BLOOD: CPT

## 2020-11-04 RX ORDER — NADOLOL 20 MG/1
40 TABLET ORAL DAILY
Status: DISCONTINUED | OUTPATIENT
Start: 2020-11-05 | End: 2020-11-10 | Stop reason: HOSPADM

## 2020-11-04 RX ORDER — SODIUM CHLORIDE 0.9 % (FLUSH) 0.9 %
10 SYRINGE (ML) INJECTION EVERY 12 HOURS SCHEDULED
Status: DISCONTINUED | OUTPATIENT
Start: 2020-11-04 | End: 2020-11-10 | Stop reason: HOSPADM

## 2020-11-04 RX ORDER — ONDANSETRON 2 MG/ML
4 INJECTION INTRAMUSCULAR; INTRAVENOUS EVERY 6 HOURS PRN
Status: DISCONTINUED | OUTPATIENT
Start: 2020-11-04 | End: 2020-11-10 | Stop reason: HOSPADM

## 2020-11-04 RX ORDER — CLONAZEPAM 0.5 MG/1
1 TABLET ORAL 2 TIMES DAILY PRN
Status: DISCONTINUED | OUTPATIENT
Start: 2020-11-04 | End: 2020-11-07

## 2020-11-04 RX ORDER — VENLAFAXINE HYDROCHLORIDE 75 MG/1
150 CAPSULE, EXTENDED RELEASE ORAL DAILY
Status: DISCONTINUED | OUTPATIENT
Start: 2020-11-05 | End: 2020-11-10 | Stop reason: HOSPADM

## 2020-11-04 RX ORDER — NICOTINE 21 MG/24HR
1 PATCH, TRANSDERMAL 24 HOURS TRANSDERMAL DAILY PRN
Status: DISCONTINUED | OUTPATIENT
Start: 2020-11-04 | End: 2020-11-10 | Stop reason: HOSPADM

## 2020-11-04 RX ORDER — ASPIRIN 81 MG/1
81 TABLET ORAL DAILY
Status: DISCONTINUED | OUTPATIENT
Start: 2020-11-04 | End: 2020-11-10 | Stop reason: HOSPADM

## 2020-11-04 RX ORDER — ALOGLIPTIN 25 MG/1
25 TABLET, FILM COATED ORAL DAILY
Status: DISCONTINUED | OUTPATIENT
Start: 2020-11-04 | End: 2020-11-10 | Stop reason: HOSPADM

## 2020-11-04 RX ORDER — OXYBUTYNIN CHLORIDE 5 MG/1
5 TABLET ORAL 2 TIMES DAILY
Status: DISCONTINUED | OUTPATIENT
Start: 2020-11-04 | End: 2020-11-10 | Stop reason: HOSPADM

## 2020-11-04 RX ORDER — TAMSULOSIN HYDROCHLORIDE 0.4 MG/1
0.4 CAPSULE ORAL DAILY
Status: DISCONTINUED | OUTPATIENT
Start: 2020-11-04 | End: 2020-11-10 | Stop reason: HOSPADM

## 2020-11-04 RX ORDER — SPIRONOLACTONE 25 MG/1
75 TABLET ORAL 2 TIMES DAILY
Status: DISCONTINUED | OUTPATIENT
Start: 2020-11-04 | End: 2020-11-10 | Stop reason: HOSPADM

## 2020-11-04 RX ORDER — POLYETHYLENE GLYCOL 3350 17 G/17G
17 POWDER, FOR SOLUTION ORAL DAILY PRN
Status: DISCONTINUED | OUTPATIENT
Start: 2020-11-04 | End: 2020-11-10 | Stop reason: HOSPADM

## 2020-11-04 RX ORDER — PANTOPRAZOLE SODIUM 40 MG/1
40 TABLET, DELAYED RELEASE ORAL
Status: DISCONTINUED | OUTPATIENT
Start: 2020-11-05 | End: 2020-11-10 | Stop reason: HOSPADM

## 2020-11-04 RX ORDER — ACETAMINOPHEN 650 MG/1
650 SUPPOSITORY RECTAL EVERY 6 HOURS PRN
Status: DISCONTINUED | OUTPATIENT
Start: 2020-11-04 | End: 2020-11-10 | Stop reason: HOSPADM

## 2020-11-04 RX ORDER — LORAZEPAM 1 MG/1
1 TABLET ORAL EVERY 6 HOURS PRN
Status: DISCONTINUED | OUTPATIENT
Start: 2020-11-04 | End: 2020-11-05

## 2020-11-04 RX ORDER — BETHANECHOL CHLORIDE 10 MG/1
10 TABLET ORAL 3 TIMES DAILY
Status: DISCONTINUED | OUTPATIENT
Start: 2020-11-04 | End: 2020-11-10 | Stop reason: HOSPADM

## 2020-11-04 RX ORDER — ALBUTEROL SULFATE 2.5 MG/3ML
2.5 SOLUTION RESPIRATORY (INHALATION)
Status: DISCONTINUED | OUTPATIENT
Start: 2020-11-04 | End: 2020-11-10 | Stop reason: HOSPADM

## 2020-11-04 RX ORDER — SODIUM CHLORIDE 9 MG/ML
INJECTION, SOLUTION INTRAVENOUS CONTINUOUS
Status: ACTIVE | OUTPATIENT
Start: 2020-11-04 | End: 2020-11-05

## 2020-11-04 RX ORDER — PREGABALIN 75 MG/1
150 CAPSULE ORAL 2 TIMES DAILY
Status: DISCONTINUED | OUTPATIENT
Start: 2020-11-04 | End: 2020-11-10 | Stop reason: HOSPADM

## 2020-11-04 RX ORDER — BUMETANIDE 1 MG/1
2 TABLET ORAL 2 TIMES DAILY
Status: DISCONTINUED | OUTPATIENT
Start: 2020-11-04 | End: 2020-11-10 | Stop reason: HOSPADM

## 2020-11-04 RX ORDER — DEXTROSE MONOHYDRATE 25 G/50ML
12.5 INJECTION, SOLUTION INTRAVENOUS PRN
Status: DISCONTINUED | OUTPATIENT
Start: 2020-11-04 | End: 2020-11-10 | Stop reason: HOSPADM

## 2020-11-04 RX ORDER — POTASSIUM CHLORIDE 750 MG/1
20 TABLET, FILM COATED, EXTENDED RELEASE ORAL
Status: ON HOLD | COMMUNITY
End: 2021-10-15

## 2020-11-04 RX ORDER — ACETAMINOPHEN 325 MG/1
650 TABLET ORAL EVERY 6 HOURS PRN
Status: DISCONTINUED | OUTPATIENT
Start: 2020-11-04 | End: 2020-11-10 | Stop reason: HOSPADM

## 2020-11-04 RX ORDER — DULOXETIN HYDROCHLORIDE 60 MG/1
60 CAPSULE, DELAYED RELEASE ORAL EVERY MORNING
Status: DISCONTINUED | OUTPATIENT
Start: 2020-11-05 | End: 2020-11-10 | Stop reason: HOSPADM

## 2020-11-04 RX ORDER — SODIUM CHLORIDE 0.9 % (FLUSH) 0.9 %
10 SYRINGE (ML) INJECTION PRN
Status: DISCONTINUED | OUTPATIENT
Start: 2020-11-04 | End: 2020-11-10 | Stop reason: HOSPADM

## 2020-11-04 RX ORDER — ALBUTEROL SULFATE 90 UG/1
2 AEROSOL, METERED RESPIRATORY (INHALATION) EVERY 6 HOURS PRN
COMMUNITY

## 2020-11-04 RX ORDER — DEXTROSE MONOHYDRATE 50 MG/ML
100 INJECTION, SOLUTION INTRAVENOUS PRN
Status: DISCONTINUED | OUTPATIENT
Start: 2020-11-04 | End: 2020-11-10 | Stop reason: HOSPADM

## 2020-11-04 RX ORDER — 0.9 % SODIUM CHLORIDE 0.9 %
1000 INTRAVENOUS SOLUTION INTRAVENOUS ONCE
Status: COMPLETED | OUTPATIENT
Start: 2020-11-04 | End: 2020-11-04

## 2020-11-04 RX ORDER — LEVOTHYROXINE SODIUM 0.15 MG/1
150 TABLET ORAL DAILY
Status: DISCONTINUED | OUTPATIENT
Start: 2020-11-05 | End: 2020-11-10 | Stop reason: HOSPADM

## 2020-11-04 RX ORDER — NICOTINE POLACRILEX 4 MG
15 LOZENGE BUCCAL PRN
Status: DISCONTINUED | OUTPATIENT
Start: 2020-11-04 | End: 2020-11-10 | Stop reason: HOSPADM

## 2020-11-04 RX ORDER — POTASSIUM CHLORIDE 20 MEQ/1
20 TABLET, EXTENDED RELEASE ORAL
Status: DISCONTINUED | OUTPATIENT
Start: 2020-11-05 | End: 2020-11-10 | Stop reason: HOSPADM

## 2020-11-04 RX ORDER — INSULIN GLARGINE 100 [IU]/ML
80 INJECTION, SOLUTION SUBCUTANEOUS
Status: ON HOLD | COMMUNITY
End: 2020-11-06 | Stop reason: ALTCHOICE

## 2020-11-04 RX ORDER — OXYCODONE HYDROCHLORIDE AND ACETAMINOPHEN 5; 325 MG/1; MG/1
1.5 TABLET ORAL EVERY 6 HOURS PRN
Status: DISCONTINUED | OUTPATIENT
Start: 2020-11-04 | End: 2020-11-10 | Stop reason: HOSPADM

## 2020-11-04 RX ORDER — INSULIN GLARGINE 100 [IU]/ML
80 INJECTION, SOLUTION SUBCUTANEOUS 2 TIMES DAILY
Status: DISCONTINUED | OUTPATIENT
Start: 2020-11-04 | End: 2020-11-10 | Stop reason: HOSPADM

## 2020-11-04 RX ORDER — ALBUTEROL SULFATE 90 UG/1
2 AEROSOL, METERED RESPIRATORY (INHALATION) EVERY 6 HOURS PRN
Status: DISCONTINUED | OUTPATIENT
Start: 2020-11-04 | End: 2020-11-10 | Stop reason: HOSPADM

## 2020-11-04 RX ORDER — HYDROXYZINE HYDROCHLORIDE 25 MG/1
25 TABLET, FILM COATED ORAL 3 TIMES DAILY PRN
Status: DISCONTINUED | OUTPATIENT
Start: 2020-11-04 | End: 2020-11-10 | Stop reason: HOSPADM

## 2020-11-04 RX ORDER — IBUPROFEN 600 MG/1
600 TABLET ORAL EVERY 6 HOURS PRN
Status: DISCONTINUED | OUTPATIENT
Start: 2020-11-04 | End: 2020-11-10 | Stop reason: HOSPADM

## 2020-11-04 RX ORDER — PROMETHAZINE HYDROCHLORIDE 25 MG/1
12.5 TABLET ORAL EVERY 6 HOURS PRN
Status: DISCONTINUED | OUTPATIENT
Start: 2020-11-04 | End: 2020-11-10 | Stop reason: HOSPADM

## 2020-11-04 RX ADMIN — ENOXAPARIN SODIUM 40 MG: 40 INJECTION SUBCUTANEOUS at 23:29

## 2020-11-04 RX ADMIN — ONDANSETRON 4 MG: 2 INJECTION INTRAMUSCULAR; INTRAVENOUS at 15:36

## 2020-11-04 RX ADMIN — ANTI-FUNGAL POWDER MICONAZOLE NITRATE TALC FREE: 1.42 POWDER TOPICAL at 22:06

## 2020-11-04 RX ADMIN — SODIUM CHLORIDE 1000 ML: 9 INJECTION, SOLUTION INTRAVENOUS at 12:05

## 2020-11-04 RX ADMIN — SODIUM CHLORIDE: 9 INJECTION, SOLUTION INTRAVENOUS at 15:34

## 2020-11-04 RX ADMIN — CEFTRIAXONE SODIUM 1 G: 1 INJECTION, POWDER, FOR SOLUTION INTRAMUSCULAR; INTRAVENOUS at 13:41

## 2020-11-04 ASSESSMENT — ENCOUNTER SYMPTOMS
VOMITING: 0
COLOR CHANGE: 0
SORE THROAT: 0
RHINORRHEA: 0
DIARRHEA: 0
ABDOMINAL PAIN: 0
COUGH: 0
SINUS PRESSURE: 0
NAUSEA: 0
CONSTIPATION: 0
WHEEZING: 0
SHORTNESS OF BREATH: 0

## 2020-11-04 NOTE — H&P
[facility]  Office: [address], DO, Priscillathomas Morales, DO, Sancho Burgess, DO, Miladis Anderson, DO, Valiant Sicard, MD, Arik Sol MD, Jian Brown MD, Gabriele Ace MD, Vanessa Erickson MD, Ele Wilks MD, Nishant Garcia MD, Asa Worthy MD, Emily Chacon MD, Epifanio Wilson, DO, Annie Cash MD, María Levy MD, Tonja Del Rio DO, Chioma Gale MD,  Antoine Frankel DO, Jasmin Reddy MD, Jaci Serna MD, Shahid Morrell, [facility], [facility], [facility], Shereen Kumar, [facility], Randell Kathleen, CNS, Claudette Vega, CNP, Megan Ruiz, CNP, Essie Sainz, CNP, Shavonne Patino, CNP, Aixa Shaw, CNP, Beau Acevedo PA-C, Antoine Mota, [facility], Dimitri Mancilla, CNP, Kirill Dias, CNP, Sydnee Cisneros, CNP, Tsering Ge, CNP, Parker Del Valle, [facility]    HISTORY AND PHYSICAL EXAMINATION            Date:   11/4/2020  Patient name:  Jareth Walker  Date of admission:  11/4/2020 11:46 AM  MRN:   [id number]  Account:  [de-identified]  YOB: 1965  PCP:    Gabby Wadsworth MD  Room:   [place] [address]  Code Status:    Full Code    Chief Complaint:     Chief Complaint   Patient presents with    Altered Mental Status       History Obtained From:     patient, electronic medical record    History of Present Illness:     Jareth Walker is a 63 y.o. Non-/non  male who presents with Altered Mental Status  and is admitted to the hospital for the management of Urinary tract infection associated with indwelling urethral catheter ([facility] patient was brought in per EMS for progressively worsening confusion over the last several days and now with delirium and hallucinations. Patient has multiple co-morbidities including urinary retention with chronic indwelling wells catheter for whom he follows with Dr. Concepcion Gould. He has multiple caregivers at home who report that he gets like this when ever he gets a UTI  UA in ED consistent with UTI. Patient also fell Sunday, unclear whether or not he hit his head, CT head was unremarkable. WBC and ammonia levels WNL     Past Medical History:     Past Medical History:   Diagnosis Date    Anxiety and depression     Back pain, chronic     BPH (benign prostatic hyperplasia)     CHF (congestive heart failure) (HCC)     Cirrhosis (HCC)     Diabetes mellitus ([address]     not checking bloodsugar at home    GERD (gastroesophageal reflux disease)     H/O cardiac catheterization not sure of date    no stents    Hepatitis     Pt does not know the type.      Hiatal hernia     History of alcohol abuse     Sober since 2013    Hyperlipidemia     not taking any medication    Hypertension     IBS (irritable bowel syndrome)     Morbid obesity ([address]     Neuropathy     feet,toes    On home oxygen therapy     prn    Pancreatitis     x 5 episodes    Primary osteoarthritis of right knee     Sleep apnea     No machine    Thyroid disease     Urinary bladder neurogenic dysfunction         Past Surgical History:     Past Surgical History:   Procedure Laterality Date    ABDOMEN SURGERY      hernia repair x4 (ventral)    COLONOSCOPY      2012    CYSTOSCOPY  01/24/2018    CYSTOSCOPY  02/20/2019    CYSTOSCOPY N/A 2/20/2019    CYSTOSCOPY DILATION performed by Estuardo Bingham MD at [address] N/A 8/23/2019    CYSTOSCOPY/ DILATION NICOLE CATHETER EXCHANGE performed by Estuardo Bingham MD at [address] [facility], COLON, DIAGNOSTIC     [address]  05/20/2018    repair and reduction of recurrent incarcerated incisional hernia with mesh    [place] CYSTOURETHROSCOPY N/A 1/24/2018    CYSTOSCOPY [place] performed by Estuardo Bingham MD at [address] Bilateral 8/22/2017    FOOT DEBRIDEMENT INCISION AND DRAINAGE with bone bx performed by Palmira Singh DPM at [address] EGD TRANSORAL BIOPSY SINGLE/MULTIPLE N/A 7/19/2017    EGD BIOPSY performed by Rhea Zepeda MD at [address] [place] [address]  07/19/2017    polypectomy    UPPER GASTROINTESTINAL ENDOSCOPY N/A 3/14/2019    EGD BIOPSY performed by Ryder Keller MD at [address] [facility] N/A 5/20/2018    REPAIR AND REDUCTION OF RECURRENT INCARCERATED INCISIONAL HERNIA WITH MESH performed by Roxane Mcdonough MD at [address] [facility]        Medications Prior to Admission:     Prior to Admission medications    Medication Sig Start Date End Date Taking? Authorizing Provider   insulin glargine (LANTUS) 100 UNIT/ML injection vial Inject 80 Units into the skin Daily with lunch   Yes Historical Provider, MD   naloxegol (MOVANTIK) 25 MG TABS tablet Take 25 mg by mouth every morning   Yes Historical Provider, MD   potassium chloride (KLOR-CON M) 20 MEQ TBCR extended release tablet Take 20 mEq by mouth daily (with breakfast)   Yes Historical Provider, MD   albuterol sulfate HFA (PROAIR HFA) 108 (90 Base) MCG/ACT inhaler Inhale 2 puffs into the lungs every 6 hours as needed for Wheezing   Yes Historical Provider, MD   hydrocortisone 2.5 % cream Apply topically as needed Apply topically 2 times daily. Yes Historical Provider, MD   oxyCODONE-acetaminophen (PERCOCET) 7.5-325 MG per tablet Take 1 tablet by mouth every 6 hours as needed for Pain.    Yes Historical Provider, MD   bethanechol (URECHOLINE) 10 MG tablet Take 10 mg by mouth 3 times daily   Yes Historical Provider, MD   tamsulosin (FLOMAX) 0.4 MG capsule Take 1 capsule by mouth daily 1/24/18  Yes Everett Tristan MD   hydrOXYzine (ATARAX) 25 MG tablet Take 25 mg by mouth 3 times daily as needed for Itching   Yes Historical Provider, MD   oxybutynin (DITROPAN) 5 MG tablet Take 1 tablet by mouth 2 times daily 12/12/17  Yes Camacho Samayoa DO   bumetanide (BUMEX) 2 MG tablet Take 2 mg by mouth 2 times daily   Yes Historical Provider, MD   ibuprofen (ADVIL;MOTRIN) 600 MG tablet Take 600 mg by mouth every 6 hours as needed for Pain Yes Historical Provider, MD   spironolactone (ALDACTONE) 25 MG tablet Take 50 mg by mouth 2 times daily Indications: pt takes 25mg and 50mg tab to equal 75mg BID    Yes Historical Provider, MD   SITagliptin (JANUVIA) 100 MG tablet Take 1 tablet by mouth daily 7/26/17  Yes Redd Villeda DO   nadolol (CORGARD) 40 MG tablet Take 40 mg by mouth daily   Yes Historical Provider, MD   levothyroxine (SYNTHROID) 150 MCG tablet Take 150 mcg by mouth Daily   Yes Historical Provider, MD   nystatin (MYCOSTATIN) 593142 UNIT/GM powder Apply topically 2 times daily as needed TO ABDOMINAL FOLDS, GROIN    Yes Historical Provider, MD   rifaximin (XIFAXAN) 550 MG tablet Take 550 mg by mouth 2 times daily   Yes Historical Provider, MD   esomeprazole (NEXIUM) 40 MG capsule Take 40 mg by mouth 2 times daily    Yes Historical Provider, MD   DULoxetine (CYMBALTA) 60 MG capsule Take 60 mg by mouth every morning    Yes Historical Provider, MD   pregabalin (LYRICA) 150 MG capsule Take 150 mg by mouth 2 times daily   Yes Historical Provider, MD   clonazePAM (KLONOPIN) 1 MG tablet Take 1 mg by mouth 2 times daily as needed   Yes Historical Provider, MD   nitroGLYCERIN (NITROSTAT) 0.4 MG SL tablet Place 1 tablet under the tongue every 5 minutes as needed for Chest pain. 6/21/14  Yes Dez Chua MD   venlafaxine (EFFEXOR-XR) 150 MG XR capsule Take 150 mg by mouth daily. Yes Historical Provider, MD   LORazepam (ATIVAN) 1 MG tablet Take 1 mg by mouth every 6 hours as needed for Anxiety.     Historical Provider, MD   fluconazole (DIFLUCAN) 100 MG tablet Take 100 mg by mouth daily    Historical Provider, MD   Insulin Glargine (BASAGLAR KWIKPEN SC) Inject 80 Units into the skin 2 times daily     Historical Provider, MD   metFORMIN (GLUCOPHAGE) 1000 MG tablet Take 500 mg by mouth 2 times daily     Historical Provider, MD   aspirin 81 MG EC tablet Take 1 tablet by mouth daily 7/26/17   Redd Villeda DO        Allergies:     No known allergies    Social History:     Tobacco:    reports that he has never smoked. He has never used smokeless tobacco.  Alcohol:      reports no history of alcohol use. Drug Use:  reports no history of drug use. Family History:     Family History   Adopted: Yes       Review of Systems:     Positive and Negative as described in HPI. Patient Unable to provide any meaningful history due to AMS, no family available at bedside     + for confusion and hallucinations reported per family to ED staff  Physical Exam:   BP (!) 154/95   Pulse 74   Temp 99.3 °F (37.4 °C) (Oral)   Resp 20   Ht 5' 11\" (1.803 m)   Wt (!) 400 lb (181.4 kg)   BMI 55.79 kg/m²   Temp (24hrs), Av.3 °F (37.4 °C), Min:99.3 °F (37.4 °C), Max:99.3 °F (37.4 °C)    No results for input(s): POCGLU in the last 72 hours. No intake or output data in the 24 hours ending 20 1624    General Appearance:  alert, appears chronically ill, unkempt and in no acute distress  Mental status: disoriented to place, and time. Oriented to self only   Head:  normocephalic, atraumatic  Eye: no icterus, redness, pupils equal and reactive, extraocular eye movements intact, conjunctiva clear  Ear: normal external ear, no discharge, hearing intact  Nose:  no drainage noted  Mouth: mucous membranes moist  Neck: supple, no carotid bruits, thyroid not palpable  Lungs: Bilateral equal air entry, diminished to auscultation, no wheezing, rales or rhonchi, normal effort  Cardiovascular: normal rate, regular rhythm, no murmur, gallop, rub.   Abdomen: Soft,obese  nontender, nondistended, normal bowel sounds, no hepatomegaly or splenomegaly  Neurologic: There are no new focal motor or sensory deficits, normal muscle tone and bulk, no abnormal sensation, normal speech, cranial nerves II through XII grossly intact  Skin: BLE very dry, flaky with some breakdown from friction/immobility,  no bruising or bleeding on exposed skin area  Extremities:  peripheral pulses palpable, no 1.50 - 8.10 k/uL    Absolute Lymph # 0.79 (L) 1.10 - 3.70 k/uL    Absolute Mono # 0.47 0.10 - 1.20 k/uL    Absolute Eos # 0.07 0.00 - 0.44 k/uL    Basophils Absolute 0.04 0.00 - 0.20 k/uL    Absolute Immature Granulocyte 0.06 0.00 - 0.30 k/uL    WBC Morphology NOT REPORTED     RBC Morphology ANISOCYTOSIS PRESENT     Platelet Estimate NOT REPORTED    Basic Metabolic Panel    Collection Time: 11/04/20 12:39 PM   Result Value Ref Range    Glucose 165 (H) 70 - 99 mg/dL    BUN 13 6 - 20 mg/dL    CREATININE 0.71 0.70 - 1.20 mg/dL    Bun/Cre Ratio 18 9 - 20    Calcium 9.2 8.6 - 10.4 mg/dL    Sodium 138 135 - 144 mmol/L    Potassium 3.3 (L) 3.7 - 5.3 mmol/L    Chloride 92 (L) 98 - 107 mmol/L    CO2 37 (H) 20 - 31 mmol/L    Anion Gap 9 9 - 17 mmol/L    GFR Non-African American >60 >60 mL/min    GFR African American >60 >60 mL/min    GFR Comment          GFR Staging NOT REPORTED    Trop/Myoglobin    Collection Time: 11/04/20 12:39 PM   Result Value Ref Range    Troponin, High Sensitivity 11 0 - 22 ng/L    Troponin T NOT REPORTED <0.03 ng/mL    Troponin Interp NOT REPORTED     Myoglobin 64 28 - 72 ng/mL   CK    Collection Time: 11/04/20 12:39 PM   Result Value Ref Range    Total CK 45 39 - 308 U/L   Lactic Acid    Collection Time: 11/04/20 12:39 PM   Result Value Ref Range    Lactic Acid 1.9 0.5 - 2.2 mmol/L   Ammonia    Collection Time: 11/04/20 12:39 PM   Result Value Ref Range    Ammonia 47 16 - 60 umol/L   Hepatic Function Panel    Collection Time: 11/04/20 12:39 PM   Result Value Ref Range    Alb 3.3 (L) 3.5 - 5.2 g/dL    Alkaline Phosphatase 197 (H) 40 - 129 U/L    ALT 15 5 - 41 U/L    AST 20 <40 U/L    Total Bilirubin 0.77 0.3 - 1.2 mg/dL    Bilirubin, Direct 0.22 <0.31 mg/dL    Bilirubin, Indirect 0.55 0.00 - 1.00 mg/dL    Total Protein 7.7 6.4 - 8.3 g/dL    Globulin NOT REPORTED 1.5 - 3.8 g/dL    Albumin/Globulin Ratio NOT REPORTED 1.0 - 2.5       Imaging/Diagnostics:  Ct Head Wo Contrast    Result Date: 11/4/2020  No acute intracranial abnormality. Xr Chest Portable    Result Date: 11/4/2020  Small right lung base opacity is nonspecific but likely atelectasis and a pleural effusion. Pneumonia is not excluded but felt to be less likely. Assessment :      Hospital Problems           Last Modified POA    * (Principal) Urinary tract infection associated with indwelling urethral catheter ([address] 84/1/2146 Yes    Alcoholic cirrhosis of liver ([address] 11/4/2020 Yes    Bipolar disorder ([address] 11/4/2020 Yes    Type 2 diabetes mellitus with diabetic neuropathy, with long-term current use of insulin ([address] 11/4/2020 Yes    Hyperlipidemia 11/4/2020 Yes    FABI (obstructive sleep apnea) 11/4/2020 Yes    Hypothyroidism 11/4/2020 Yes    Urine retention 11/4/2020 Yes    Chronic indwelling Clemons catheter (Chronic) 11/4/2020 Yes    Back pain, chronic 11/4/2020 Yes    Chronic diastolic congestive heart failure ([facility] 11/4/2020 Yes    GERD (gastroesophageal reflux disease) 11/4/2020 Yes    Morbid obesity ([facility] 11/4/2020 Yes    Delirium due to another medical condition 11/4/2020 Yes    Musculoskeletal immobility 11/4/2020 Yes          Plan:     Patient status inpatient in the Progressive Unit/Step down    1. Resume selected home meds   2. Start IV rocephin  3. Consult urology  4. Strict Bedrest   5. Telemetry monitoring  6. Diabetic diet   7. DVT prophylaxis   8. Glycemic control, hypoglycemia treatment protocol, continue lantus, accuchecks AC&HS, ISS   9. Strict intake and output   10. Monitor labs, replace electrolytes as needed   11. PT/OT evaluation   12. Gentle IV hydration, need to be cautious due to history of chronic diastolic CHF   13. Supplemental oxygen  14.  consult  15. Consult wound care    16. Pain control, home pain meds continued PRN for chronic back pain  17.  Plan discussed with staff     Consultations:   IP CONSULT TO INTERNAL MEDICINE  IP CONSULT TO UROLOGY  IP CONSULT TO SOCIAL WORK     Patient is admitted as inpatient status because of co-morbidities listed above, severity of signs and symptoms as outlined, requirement for current medical therapies and most importantly because of direct risk to patient if care not provided in a hospital setting. Expected length of stay > 48 hours.     SUNSHINE LIM NP  11/4/2020  4:24 PM    Copy sent to Dr. Lonny Dutton MD

## 2020-11-04 NOTE — PROGRESS NOTES
Patient weight is 150 kg or greater. For prophylaxis with Enoxaparin, Pharmacy adjusted the dose to account for the patient's increased body weight in accordance with hospital approved protocol. The dose has been changed to 40 mg BID. Please contact pharmacy with any concerns @ [phone]. Thank you. Lender Canavan  11/4/2020 3:31 PM  Weight = 181.4 kg.

## 2020-11-04 NOTE — ED PROVIDER NOTES
performed by Beverly Garcia MD at [address]  07/19/2017    polypectomy    UPPER GASTROINTESTINAL ENDOSCOPY N/A 3/14/2019    EGD BIOPSY performed by Rose Thomas MD at [address] [place] [address] N/A 5/20/2018    REPAIR AND REDUCTION OF RECURRENT INCARCERATED INCISIONAL HERNIA WITH MESH performed by Abdi Roque MD at [place] [address]           Adopted: Yes     No family status information on file. SOCIAL HISTORY      reports that he has never smoked. He has never used smokeless tobacco. He reports that he does not drink alcohol or use drugs. REVIEW OF SYSTEMS    (2-9 systems for level 4, 10 or more for level 5)     Review of Systems   Constitutional: Negative for chills, fever and unexpected weight change. HENT: Negative for congestion, rhinorrhea, sinus pressure and sore throat. Respiratory: Negative for cough, shortness of breath and wheezing. Cardiovascular: Negative for chest pain and palpitations. Gastrointestinal: Negative for abdominal pain, constipation, diarrhea, nausea and vomiting. Genitourinary: Negative for dysuria and hematuria. Musculoskeletal: Negative for arthralgias and myalgias. Skin: Negative for color change and rash. Neurological: Negative for dizziness, weakness and headaches. Hematological: Negative for adenopathy. All other systems reviewed and are negative. Except as noted above the remainder of the review of systems was reviewed and negative. PHYSICAL EXAM    (up to 7 for level 4, 8 or more for level 5)     ED Triage Vitals   BP Temp Temp Source Pulse Resp SpO2 Height Weight   11/04/20 1140 11/04/20 1140 11/04/20 1140 11/04/20 1140 11/04/20 1140 11/04/20 1724 11/04/20 1140 11/04/20 1140   (!) 154/95 99.3 °F (37.4 °C) Oral 74 20 92 % 5' 11\" (1.803 m) (!) 400 lb (181.4 kg)       Physical Exam  Vitals signs reviewed. Constitutional:       Appearance: He is well-developed. HENT:      Head: Normocephalic and atraumatic. Eyes:      Conjunctiva/sclera: Conjunctivae normal.      Pupils: Pupils are equal, round, and reactive to light. Neck:      Musculoskeletal: Normal range of motion and neck supple. Cardiovascular:      Rate and Rhythm: Normal rate and regular rhythm. Pulmonary:      Effort: Pulmonary effort is normal. No respiratory distress. Breath sounds: Normal breath sounds. No stridor. Abdominal:      General: Abdomen is protuberant. Bowel sounds are normal.      Palpations: Abdomen is soft. Musculoskeletal: Normal range of motion. Lymphadenopathy:      Cervical: No cervical adenopathy. Skin:     General: Skin is warm and dry. Findings: No rash. Comments: Dressings to bilateral lower extemities   Neurological:      Mental Status: He is alert. He is confused. RADIOLOGY:   Non-plain film images such as CT, Ultrasound and MRI are read by the radiologist. [facility] are visualized and preliminarily interpreted by the emergency physician with the below findings:    Ct Head Wo Contrast    Result Date: 11/4/2020  EXAMINATION: CT OF THE HEAD WITHOUT CONTRAST  11/4/2020 12:03 pm TECHNIQUE: CT of the head was performed without the administration of intravenous contrast. Dose modulation, iterative reconstruction, and/or weight based adjustment of the mA/kV was utilized to reduce the radiation dose to as low as reasonably achievable. COMPARISON: 09/23/2015 HISTORY: ORDERING SYSTEM PROVIDED HISTORY: AMS Reason for Exam: AMS- pt is very uncooperative. Best images possible obtained due to patient condition Acuity: Unknown Type of Exam: Unknown FINDINGS: BRAIN/VENTRICLES: No acute intracranial hemorrhage, mass effect or midline shift. No abnormal extra-axial fluid collection. The gray-white differentiation is maintained without evidence of an acute infarct. No evidence of hydrocephalus.  ORBITS: The visualized portion of the orbits other components within normal limits   HEPATIC FUNCTION PANEL - Abnormal; Notable for the following components:    Alb 3.3 (*)     Alkaline Phosphatase 197 (*)     All other components within normal limits   MICROSCOPIC URINALYSIS - Abnormal; Notable for the following components:    Bacteria, UA MODERATE (*)     All other components within normal limits   CULTURE, URINE   CULTURE, BLOOD 1   CULTURE, BLOOD 1   TROP/MYOGLOBIN   CK   LACTIC ACID   AMMONIA   POCT GLUCOSE   POCT GLUCOSE       All other labs were within normal range or not returned as of this dictation. EMERGENCY DEPARTMENT COURSE and DIFFERENTIAL DIAGNOSIS/MDM:   Vitals:    Vitals:    11/04/20 1140 11/04/20 1724   BP: (!) 154/95 93/75   Pulse: 74 83   Resp: 20 16   Temp: 99.3 °F (37.4 °C) 98.8 °F (37.1 °C)   TempSrc: Oral Oral   SpO2:  92%   Weight: (!) 400 lb (181.4 kg)    Height: 5' 11\" (1.803 m)        Medical Decision Making: Patient was found to have a urinary tract infection. Blood cultures x2 were drawn the patient was started on IV Rocephin. Case was discussed with internal medicine.   Medications   0.9 % sodium chloride infusion ( Intravenous New Bag 11/4/20 1534)   sodium chloride flush 0.9 % injection 10 mL (has no administration in time range)   sodium chloride flush 0.9 % injection 10 mL (has no administration in time range)   acetaminophen (TYLENOL) tablet 650 mg (has no administration in time range)     Or   acetaminophen (TYLENOL) suppository 650 mg (has no administration in time range)   polyethylene glycol (GLYCOLAX) packet 17 g (has no administration in time range)   promethazine (PHENERGAN) tablet 12.5 mg ( Oral See Alternative 11/4/20 1536)     Or   ondansetron (ZOFRAN) injection 4 mg (4 mg Intravenous Given 11/4/20 1536)   nicotine (NICODERM CQ) 21 MG/24HR 1 patch (has no administration in time range)   enoxaparin (LOVENOX) injection 40 mg (has no administration in time range)   cefTRIAXone (ROCEPHIN) 1 g IVPB in 50 mL D5W minibag (has no administration in time range)   albuterol sulfate  (90 Base) MCG/ACT inhaler 2 puff (has no administration in time range)   aspirin EC tablet 81 mg (has no administration in time range)   tamsulosin (FLOMAX) capsule 0.4 mg (has no administration in time range)   miconazole (MICOTIN) 2 % powder (has no administration in time range)   pantoprazole (PROTONIX) tablet 40 mg (has no administration in time range)   venlafaxine (EFFEXOR XR) extended release capsule 150 mg (has no administration in time range)   bumetanide (BUMEX) tablet 2 mg (has no administration in time range)   clonazePAM (KLONOPIN) tablet 1 mg (has no administration in time range)   bethanechol (URECHOLINE) tablet 10 mg (has no administration in time range)   DULoxetine (CYMBALTA) extended release capsule 60 mg (has no administration in time range)   levothyroxine (SYNTHROID) tablet 150 mcg (has no administration in time range)   LORazepam (ATIVAN) tablet 1 mg (has no administration in time range)   metFORMIN (GLUCOPHAGE) tablet 500 mg (has no administration in time range)   nadolol (CORGARD) tablet 40 mg (has no administration in time range)   pregabalin (LYRICA) capsule 150 mg (has no administration in time range)   oxyCODONE-acetaminophen (PERCOCET) 5-325 MG per tablet 1.5 tablet (has no administration in time range)   alogliptin (NESINA) tablet 25 mg (has no administration in time range)   rifaximin (XIFAXAN) tablet 550 mg (has no administration in time range)   oxybutynin (DITROPAN) tablet 5 mg (has no administration in time range)   naloxegol (MOVANTIK) tablet 25 mg (has no administration in time range)   hydrOXYzine (ATARAX) tablet 25 mg (has no administration in time range)   ibuprofen (ADVIL;MOTRIN) tablet 600 mg (has no administration in time range)   spironolactone (ALDACTONE) tablet 75 mg (has no administration in time range)   potassium chloride (KLOR-CON M) extended release tablet 20 mEq (has no administration in time range)   insulin glargine (LANTUS;BASAGLAR) injection pen 80 Units (has no administration in time range)   glucose (GLUTOSE) 40 % oral gel 15 g (has no administration in time range)   dextrose 50 % IV solution (has no administration in time range)   glucagon (rDNA) injection 1 mg (has no administration in time range)   dextrose 5 % solution (has no administration in time range)   insulin lispro (HUMALOG) injection vial 0-6 Units (has no administration in time range)   insulin lispro (HUMALOG) injection vial 0-3 Units (has no administration in time range)   0.9 % sodium chloride bolus (0 mLs Intravenous Stopped 11/4/20 1311)   cefTRIAXone (ROCEPHIN) 1 g IVPB in 50 mL D5W minibag (0 g Intravenous Stopped 11/4/20 1412)       CONSULTS:  IP CONSULT TO INTERNAL MEDICINE  IP CONSULT TO UROLOGY  IP CONSULT TO SOCIAL WORK      FINAL IMPRESSION      1. Urinary tract infection associated with indwelling urethral catheter, initial encounter ([facility]    2. Altered mental status, unspecified altered mental status type          DISPOSITION/PLAN   DISPOSITION Admitted 11/04/2020 02:13:00 PM      PATIENT REFERRED TO:   No follow-up provider specified.     DISCHARGE MEDICATIONS:     Current Discharge Medication List              (Please note that portions of this note were completed with a voice recognition program.  Efforts were made to edit the dictations but occasionally words are mis-transcribed.)    [address] [place] [address] NP, APRN - CNP  Certified Nurse Practitioner          SUNSHINE Hoover CNP  11/04/20 3981

## 2020-11-05 LAB
AMMONIA: 37 UMOL/L (ref 16–60)
ANION GAP SERPL CALCULATED.3IONS-SCNC: 10 MMOL/L (ref 9–17)
ANION GAP SERPL CALCULATED.3IONS-SCNC: 9 MMOL/L (ref 9–17)
BUN BLDV-MCNC: 10 MG/DL (ref 6–20)
BUN BLDV-MCNC: 12 MG/DL (ref 6–20)
BUN/CREAT BLD: 14 (ref 9–20)
BUN/CREAT BLD: 17 (ref 9–20)
CALCIUM SERPL-MCNC: 8.6 MG/DL (ref 8.6–10.4)
CALCIUM SERPL-MCNC: 9.3 MG/DL (ref 8.6–10.4)
CHLORIDE BLD-SCNC: 95 MMOL/L (ref 98–107)
CHLORIDE BLD-SCNC: 97 MMOL/L (ref 98–107)
CO2: 34 MMOL/L (ref 20–31)
CO2: 36 MMOL/L (ref 20–31)
CREAT SERPL-MCNC: 0.7 MG/DL (ref 0.7–1.2)
CREAT SERPL-MCNC: 0.74 MG/DL (ref 0.7–1.2)
FIO2: 30
GFR AFRICAN AMERICAN: >60 ML/MIN
GFR AFRICAN AMERICAN: >60 ML/MIN
GFR NON-AFRICAN AMERICAN: >60 ML/MIN
GFR NON-AFRICAN AMERICAN: >60 ML/MIN
GFR SERPL CREATININE-BSD FRML MDRD: ABNORMAL ML/MIN/{1.73_M2}
GLUCOSE BLD-MCNC: 148 MG/DL (ref 75–110)
GLUCOSE BLD-MCNC: 160 MG/DL (ref 75–110)
GLUCOSE BLD-MCNC: 164 MG/DL (ref 70–99)
GLUCOSE BLD-MCNC: 172 MG/DL (ref 75–110)
GLUCOSE BLD-MCNC: 172 MG/DL (ref 75–110)
GLUCOSE BLD-MCNC: 182 MG/DL (ref 70–99)
GLUCOSE BLD-MCNC: 195 MG/DL (ref 75–110)
HCT VFR BLD CALC: 41.3 % (ref 40.7–50.3)
HEMOGLOBIN: 12.3 G/DL (ref 13–17)
MAGNESIUM: 1.6 MG/DL (ref 1.6–2.6)
MCH RBC QN AUTO: 25.2 PG (ref 25.2–33.5)
MCHC RBC AUTO-ENTMCNC: 29.8 G/DL (ref 28.4–34.8)
MCV RBC AUTO: 84.6 FL (ref 82.6–102.9)
NEGATIVE BASE EXCESS, ART: ABNORMAL (ref 0–2)
NRBC AUTOMATED: 0 PER 100 WBC
O2 DEVICE/FLOW/%: ABNORMAL
PATIENT TEMP: ABNORMAL
PDW BLD-RTO: 15 % (ref 11.8–14.4)
PLATELET # BLD: 163 K/UL (ref 138–453)
PMV BLD AUTO: 10.9 FL (ref 8.1–13.5)
POC HCO3: 39.1 MMOL/L (ref 22–27)
POC O2 SATURATION: 91 %
POC PCO2 TEMP: ABNORMAL MM HG
POC PCO2: 70 MM HG (ref 32–45)
POC PH TEMP: ABNORMAL
POC PH: 7.35 (ref 7.35–7.45)
POC PO2 TEMP: ABNORMAL MM HG
POC PO2: 68 MM HG (ref 75–95)
POSITIVE BASE EXCESS, ART: 14 (ref 0–2)
POTASSIUM SERPL-SCNC: 3.2 MMOL/L (ref 3.7–5.3)
POTASSIUM SERPL-SCNC: 3.5 MMOL/L (ref 3.7–5.3)
RBC # BLD: 4.88 M/UL (ref 4.21–5.77)
SARS-COV-2, RAPID: NOT DETECTED
SARS-COV-2: NORMAL
SARS-COV-2: NORMAL
SODIUM BLD-SCNC: 140 MMOL/L (ref 135–144)
SODIUM BLD-SCNC: 141 MMOL/L (ref 135–144)
SOURCE: NORMAL
TCO2 (CALC), ART: 41 MMOL/L (ref 23–28)
WBC # BLD: 10 K/UL (ref 3.5–11.3)

## 2020-11-05 PROCEDURE — 2060000000 HC ICU INTERMEDIATE R&B

## 2020-11-05 PROCEDURE — APPNB180 APP NON BILLABLE TIME > 60 MINS: Performed by: NURSE PRACTITIONER

## 2020-11-05 PROCEDURE — 6360000002 HC RX W HCPCS: Performed by: NURSE PRACTITIONER

## 2020-11-05 PROCEDURE — 83735 ASSAY OF MAGNESIUM: CPT

## 2020-11-05 PROCEDURE — 80048 BASIC METABOLIC PNL TOTAL CA: CPT

## 2020-11-05 PROCEDURE — 2500000003 HC RX 250 WO HCPCS: Performed by: NURSE PRACTITIONER

## 2020-11-05 PROCEDURE — 82947 ASSAY GLUCOSE BLOOD QUANT: CPT

## 2020-11-05 PROCEDURE — 36415 COLL VENOUS BLD VENIPUNCTURE: CPT

## 2020-11-05 PROCEDURE — 94761 N-INVAS EAR/PLS OXIMETRY MLT: CPT

## 2020-11-05 PROCEDURE — 36600 WITHDRAWAL OF ARTERIAL BLOOD: CPT

## 2020-11-05 PROCEDURE — 99232 SBSQ HOSP IP/OBS MODERATE 35: CPT | Performed by: INTERNAL MEDICINE

## 2020-11-05 PROCEDURE — 2700000000 HC OXYGEN THERAPY PER DAY

## 2020-11-05 PROCEDURE — 85027 COMPLETE CBC AUTOMATED: CPT

## 2020-11-05 PROCEDURE — 99213 OFFICE O/P EST LOW 20 MIN: CPT

## 2020-11-05 PROCEDURE — 82803 BLOOD GASES ANY COMBINATION: CPT

## 2020-11-05 PROCEDURE — 2580000003 HC RX 258: Performed by: NURSE PRACTITIONER

## 2020-11-05 PROCEDURE — 94660 CPAP INITIATION&MGMT: CPT

## 2020-11-05 PROCEDURE — APPSS60 APP SPLIT SHARED TIME 46-60 MINUTES: Performed by: NURSE PRACTITIONER

## 2020-11-05 PROCEDURE — U0002 COVID-19 LAB TEST NON-CDC: HCPCS

## 2020-11-05 PROCEDURE — 6370000000 HC RX 637 (ALT 250 FOR IP): Performed by: NURSE PRACTITIONER

## 2020-11-05 PROCEDURE — 85025 COMPLETE CBC W/AUTO DIFF WBC: CPT

## 2020-11-05 PROCEDURE — 82140 ASSAY OF AMMONIA: CPT

## 2020-11-05 PROCEDURE — 99254 IP/OBS CNSLTJ NEW/EST MOD 60: CPT | Performed by: NURSE PRACTITIONER

## 2020-11-05 RX ORDER — LORAZEPAM 2 MG/ML
0.5 INJECTION INTRAMUSCULAR EVERY 6 HOURS PRN
Status: DISCONTINUED | OUTPATIENT
Start: 2020-11-05 | End: 2020-11-10 | Stop reason: HOSPADM

## 2020-11-05 RX ORDER — CIPROFLOXACIN 2 MG/ML
400 INJECTION, SOLUTION INTRAVENOUS EVERY 12 HOURS
Status: DISCONTINUED | OUTPATIENT
Start: 2020-11-05 | End: 2020-11-07

## 2020-11-05 RX ORDER — METOPROLOL TARTRATE 5 MG/5ML
5 INJECTION INTRAVENOUS EVERY 6 HOURS PRN
Status: DISCONTINUED | OUTPATIENT
Start: 2020-11-05 | End: 2020-11-10 | Stop reason: HOSPADM

## 2020-11-05 RX ORDER — SODIUM CHLORIDE 9 MG/ML
INJECTION, SOLUTION INTRAVENOUS CONTINUOUS
Status: DISCONTINUED | OUTPATIENT
Start: 2020-11-05 | End: 2020-11-10 | Stop reason: HOSPADM

## 2020-11-05 RX ORDER — POTASSIUM CHLORIDE 7.45 MG/ML
10 INJECTION INTRAVENOUS PRN
Status: DISCONTINUED | OUTPATIENT
Start: 2020-11-05 | End: 2020-11-10 | Stop reason: HOSPADM

## 2020-11-05 RX ADMIN — CIPROFLOXACIN 400 MG: 2 INJECTION, SOLUTION INTRAVENOUS at 16:21

## 2020-11-05 RX ADMIN — CEFTRIAXONE SODIUM 1 G: 1 INJECTION, POWDER, FOR SOLUTION INTRAMUSCULAR; INTRAVENOUS at 12:30

## 2020-11-05 RX ADMIN — ENOXAPARIN SODIUM 40 MG: 40 INJECTION SUBCUTANEOUS at 09:11

## 2020-11-05 RX ADMIN — MEROPENEM 1 G: 1 INJECTION, POWDER, FOR SOLUTION INTRAVENOUS at 13:19

## 2020-11-05 RX ADMIN — METOPROLOL TARTRATE 5 MG: 5 INJECTION INTRAVENOUS at 16:21

## 2020-11-05 RX ADMIN — SODIUM CHLORIDE: 9 INJECTION, SOLUTION INTRAVENOUS at 16:21

## 2020-11-05 RX ADMIN — POTASSIUM CHLORIDE 10 MEQ: 10 INJECTION, SOLUTION INTRAVENOUS at 13:20

## 2020-11-05 RX ADMIN — POTASSIUM CHLORIDE 10 MEQ: 10 INJECTION, SOLUTION INTRAVENOUS at 11:51

## 2020-11-05 RX ADMIN — SODIUM CHLORIDE: 9 INJECTION, SOLUTION INTRAVENOUS at 22:05

## 2020-11-05 RX ADMIN — ANTI-FUNGAL POWDER MICONAZOLE NITRATE TALC FREE: 1.42 POWDER TOPICAL at 09:11

## 2020-11-05 RX ADMIN — INSULIN LISPRO 1 UNITS: 100 INJECTION, SOLUTION INTRAVENOUS; SUBCUTANEOUS at 17:57

## 2020-11-05 RX ADMIN — ANTI-FUNGAL POWDER MICONAZOLE NITRATE TALC FREE: 1.42 POWDER TOPICAL at 22:05

## 2020-11-05 RX ADMIN — POTASSIUM CHLORIDE 10 MEQ: 10 INJECTION, SOLUTION INTRAVENOUS at 09:34

## 2020-11-05 RX ADMIN — POTASSIUM CHLORIDE 10 MEQ: 10 INJECTION, SOLUTION INTRAVENOUS at 10:45

## 2020-11-05 ASSESSMENT — PAIN SCALES - GENERAL: PAINLEVEL_OUTOF10: 0

## 2020-11-05 NOTE — CARE COORDINATION
Case Management Initial Discharge Plan  David Fernandez,         Readmission Risk              Risk of Unplanned Readmission:        16             Met with:family member patient and son to discuss discharge plans. Information verified: address, contacts, phone number, , insurance Yes  PCP: Dre Chacon MD  Date of last visit: JuanKrissy Lorenzrigoberto 116 Provider: [facility]    Discharge Planning  Current Residence:     Living Arrangements:  Suyapa Kearns has one stories/ramp stairs to climb  Support Systems:  Home Care Staff  Current Services PTA:    Supplier: none  Patient able to perform ADL's:Assisted  DME used to aid ambulation prior to admission: walker/wheelchair/during admissionTBD    Potential Assistance Needed:  [address] [facility]: [facility]   Potential Assistance Purchasing Medications:  No  Does patient want to participate in local refill/ meds to beds program?  Not Assessed    Patient agreeable to home care: No  [place] of choice provided:  n/a      Type of Home Care Services:  OT, PT, Aide Services, Skilled Therapy  Patient expects to be discharged to:  SNF vs Home Care    Prior SNF/Rehab Placement and Facility: yes, [facility] [address] to SNF/Rehab: Yes  [place] of choice provided: yes   Evaluation: yes    Expected Discharge date:  20  Follow Up Appointment: Best Day/ Time: Friday AM    Transportation provider: [facility]  Transportation arrangements needed for discharge: Yes    Discharge Plan: Contacted patient's son. He would like his mother, CATHERINE, to assist with dc plans. He states that patient has had several recent falls and would like patient to go to SNF. Discussed preference of SNF. He would like [facility] to assist with the decision. Phone call to [facility]. She will be at hospital to review list. Phone number for [facility] is [phone].  Willian Standard        Electronically signed by LEILA Stinson on 20 at 2:03 PM EST

## 2020-11-05 NOTE — PROGRESS NOTES
[facility]  Office: [address], DO, Lagunas Sekou, DO, Aron Pardo, DO, Oziel Anderson, DO, Annabel Baker MD, David Siddiqui MD, Tasha Powers MD, Jamie Fontenot MD, Maryanne Murry MD, Amy Martin MD, Maki Sims MD, Rahul Herrera MD, Mbonu Fleet Cowden, MD, Connie Baptiste DO, Soco Becker MD, Ann Arriaza MD, Lori Alvarado, DO, Lucius Hart MD,  Marcus Ramirez DO, Luis Eduardo Ferraro MD, Oliver Jordan MD, Marco Moulton, [facility], [facility], CNP, Uri Duval, CNP, Gretchen Smith, [facility], Alexandro Horowitz, CNP, Ursula Ghotra, CNP, Isak Mckeon, CNP, Александр Negron, CNP, Jaspal Moseley, CNP, Philly Terrazas PA-C, Janneth Hobson, [facility], Mary Salcedo, CNP, Toro Manuel, CNP, Juan De La Fuente, CNP, Julieta León, CNP, Scotty July, [facility]    Progress Note    11/5/2020    9:20 AM    Name:   Fabián Lux  MRN:     [id number]     [place]:      [de-identified]   Room:   [address] [place] [address] Day:  1  Admit Date:  11/4/2020 11:46 AM    PCP:   Kyra Puentes MD  Code Status:  Full Code    Subjective:     C/C:   Chief Complaint   Patient presents with    Altered Mental Status     Interval History Status: not changed. Patient is still disoriented,     Brief History:     Fabián Lux is a 63 y.o.  male who presents with Altered Mental Status  and is admitted to the hospital for the management of Urinary tract infection associated with indwelling urethral catheter. Patient was brought in per EMS for progressively worsening confusion over the last several days and now with delirium and hallucinations. Patient has multiple co-morbidities including urinary retention with chronic indwelling wells catheter for whom he follows with Dr. All Conklin. He has multiple caregivers at home who report that he gets like this when ever he gets a UTI. UA in ED consistent with UTI.  Patient also fell Sunday, unclear whether or not he hit his head, CT head was unremarkable. WBC and ammonia levels WNL     Review of Systems:     Unable to assess to AMS   Medications: Allergies:     Allergies   Allergen Reactions    No Known Allergies        Current Meds:   Scheduled Meds:    sodium chloride flush  10 mL Intravenous 2 times per day    enoxaparin  40 mg Subcutaneous BID    cefTRIAXone (ROCEPHIN) IV  1 g Intravenous Q24H    aspirin  81 mg Oral Daily    tamsulosin  0.4 mg Oral Daily    miconazole   Topical BID    pantoprazole  40 mg Oral QAM AC    venlafaxine  150 mg Oral Daily    bumetanide  2 mg Oral BID    bethanechol  10 mg Oral TID    DULoxetine  60 mg Oral QAM    levothyroxine  150 mcg Oral Daily    metFORMIN  500 mg Oral BID    nadolol  40 mg Oral Daily    pregabalin  150 mg Oral BID    alogliptin  25 mg Oral Daily    rifaximin  550 mg Oral BID    oxybutynin  5 mg Oral BID    naloxegol  25 mg Oral QAM    spironolactone  75 mg Oral BID    potassium chloride  20 mEq Oral Daily with breakfast    insulin glargine  80 Units Subcutaneous BID    insulin lispro  0-6 Units Subcutaneous TID WC    insulin lispro  0-3 Units Subcutaneous Nightly     Continuous Infusions:    dextrose       PRN Meds: metoprolol, potassium chloride, sodium chloride flush, acetaminophen **OR** acetaminophen, polyethylene glycol, promethazine **OR** ondansetron, nicotine, albuterol sulfate HFA, clonazePAM, LORazepam, oxyCODONE-acetaminophen, hydrOXYzine, ibuprofen, glucose, dextrose, glucagon (rDNA), dextrose, albuterol    Data:     Past Medical History:   has a past medical history of Anxiety and depression, Back pain, chronic, BPH (benign prostatic hyperplasia), [facility] Cirrhosis ([facility] Diabetes mellitus ([facility] GERD (gastroesophageal reflux disease), H/O cardiac catheterization, Hepatitis, Hiatal hernia, History of alcohol abuse, Hyperlipidemia, Hypertension, IBS (irritable bowel syndrome), Morbid obesity ([facility] Neuropathy, On home oxygen therapy, Pancreatitis, Primary osteoarthritis of right knee, Sleep apnea, Thyroid disease, and Urinary bladder neurogenic dysfunction. Social History:   reports that he has never smoked. He has never used smokeless tobacco. He reports that he does not drink alcohol or use drugs. Family History:   Family History   Adopted: Yes       Vitals:  BP (!) 154/50   Pulse 86   Temp 98.5 °F (36.9 °C) (Axillary)   Resp 24   Ht 5' 11\" (1.803 m)   Wt (!) 400 lb (181.4 kg)   SpO2 93%   BMI 55.79 kg/m²   Temp (24hrs), Av.4 °F (36.9 °C), Min:97.1 °F (36.2 °C), Max:99.3 °F (37.4 °C)    Recent Labs     20  0852   POCGLU 139* 172*       I/O (24Hr):     Intake/Output Summary (Last 24 hours) at 2020 0920  Last data filed at 2020 0426  Gross per 24 hour   Intake 476 ml   Output 950 ml   Net -474 ml       Labs:  Hematology:  Recent Labs     20  0626   WBC 6.8 10.0   RBC 4.83 4.88   HGB 12.4* 12.3*   HCT 40.2* 41.3   MCV 83.2 84.6   MCH 25.7 25.2   MCHC 30.8 29.8   RDW 14.6* 15.0*   * 163   MPV 10.4 10.9     Chemistry:  Recent Labs     20  0626    141   K 3.3* 3.2*   CL 92* 95*   CO2 37* 36*   GLUCOSE 165* 182*   BUN 13 12   CREATININE 0.71 0.70   MG  --  1.6   ANIONGAP 9 10   LABGLOM >60 >60   GFRAA >60 >60   CALCIUM 9.2 9.3   TROPHS 11  --    CKTOTAL 45  --    MYOGLOBIN 64  --      Recent Labs     20  0852   PROT 7.7  --   --    LABALBU 3.3*  --   --    AST 20  --   --    ALT 15  --   --    ALKPHOS 197*  --   --    BILITOT 0.77  --   --    BILIDIR 0.22  --   --    AMMONIA 47  --   --    POCGLU  --  139* 172*     ABG:  Lab Results   Component Value Date    POCPH 7.40 2020    PHART 7.330 2014    POCPCO2 66 2020    VEP6LHG 68.0 2014    POCPO2 88 2020    PO2ART 84.0 2014    POCHCO3 41.2 2020    NWI5UVZ 34.9 2014    NBEA NOT REPORTED 2020 PBEA 16 11/04/2020    BZG9XWZ 43 11/04/2020    MIPR3LDT 96 11/04/2020    O5IPNHUM 96.5 06/18/2014    FIO2 2.0 11/04/2020     Lab Results   Component Value Date/Time    SPECIAL 10ML LH 11/04/2020 12:35 PM     Lab Results   Component Value Date/Time    CULTURE NO GROWTH 15 HOURS 11/04/2020 12:35 PM       Radiology:  Ct Head Wo Contrast    Result Date: 11/4/2020  No acute intracranial abnormality. Xr Chest Portable    Result Date: 11/4/2020  Small right lung base opacity is nonspecific but likely atelectasis and a pleural effusion. Pneumonia is not excluded but felt to be less likely. Physical Examination:        General appearance:  Disoriented, drowsy, mumbles in response to questions.  No acute distress  Mental Status: Unable to assess  Lungs:  diminished to auscultation bilaterally, normal effort  Heart:  regular rate and rhythm, no murmur  Abdomen:  soft, nontender, nondistended, normal bowel sounds, no masses, hepatomegaly, splenomegaly  Extremities:  no edema, redness, tenderness in the calves  Skin:  no gross lesions, rashes, induration, drk flaky skin, dressing to left lower leg placed by wound care   Chronic indwelling catheter, urine straw colored with sediment noted   Assessment:        Hospital Problems           Last Modified POA    * (Principal) Urinary tract infection associated with indwelling urethral catheter ([facility] 57/8/9845 Yes    Alcoholic cirrhosis of liver ([facility] 11/4/2020 Yes    Bipolar disorder ([facility] 11/4/2020 Yes    Type 2 diabetes mellitus with diabetic neuropathy, with long-term current use of insulin ([address] 11/4/2020 Yes    Hyperlipidemia 09/3/6191 Yes    Metabolic encephalopathy 85/0/3754 Yes    FABI (obstructive sleep apnea) 11/4/2020 Yes    Hypothyroidism 11/4/2020 Yes    Urine retention 11/4/2020 Yes    Chronic indwelling Clemons catheter (Chronic) 11/4/2020 Yes    Back pain, chronic 11/4/2020 Yes    Chronic diastolic congestive heart failure ([address] 11/4/2020 Yes    GERD

## 2020-11-05 NOTE — CONSULTS
[facility] Nurse  Consult Note       NAME:  Kathi Huston  MEDICAL RECORD NUMBER:  [id number]  AGE: 63 y.o. GENDER: male  : 1965  TODAY'S DATE:  2020    Subjective:     Reason for Wound Bryan Fee Care and Assessment: skin breakdown LLMANDA Huston is a 63 y.o. male referred by:   [x] Physician  [] Nursing  [] Other:       Wound Identification:  Wound Type: venous and pressure  Contributing Factors: edema, venous stasis, lymphedema, diabetes, chronic pressure, decreased mobility, shear force and obesity    Wound History: the patient is unable to answer questions at this time due to altered mental status. Per records, he previously had a wound on the LLE with last visit to [address] [place] [address] 17-M wound care appears to be 2020. The wound was closed at that time. Current Wound Care Treatment:  Found open to air this visit    Patient Goal of Care:  [x] Wound Healing  [] Odor Control  [] Palliative Care  [] Pain Control   [] Other:         PAST MEDICAL HISTORY        Diagnosis Date    Anxiety and depression     Back pain, chronic     BPH (benign prostatic hyperplasia)     CHF (congestive heart failure) (HCC)     Cirrhosis ([facility]     Diabetes mellitus ([facility]     not checking bloodsugar at home    GERD (gastroesophageal reflux disease)     H/O cardiac catheterization not sure of date    no stents    Hepatitis     Pt does not know the type.      Hiatal hernia     History of alcohol abuse     Sober since     Hyperlipidemia     not taking any medication    Hypertension     IBS (irritable bowel syndrome)     Morbid obesity ([address]     Neuropathy     feet,toes    On home oxygen therapy     prn    Pancreatitis     x 5 episodes    Primary osteoarthritis of right knee     Sleep apnea     No machine    Thyroid disease     Urinary bladder neurogenic dysfunction        PAST SURGICAL HISTORY    Past Surgical History:   Procedure Laterality Date    ABDOMEN SURGERY      hernia repair x4 (ventral)    COLONOSCOPY      2012    CYSTOSCOPY  01/24/2018    CYSTOSCOPY  02/20/2019    CYSTOSCOPY N/A 2/20/2019    CYSTOSCOPY DILATION performed by Estuardo Bingham MD at [address] [facility] [address] N/A 8/23/2019    CYSTOSCOPY/ DILATION NICOLE CATHETER EXCHANGE performed by Estuardo Bingham MD at [address], COLON, DIAGNOSTIC     [address]  05/20/2018    repair and reduction of recurrent incarcerated incisional hernia with mesh    [place] CYSTOURETHROSCOPY N/A 1/24/2018    CYSTOSCOPY [place] performed by Estuardo Bingham MD at [address] Bilateral 8/22/2017    FOOT [address] with bone bx performed by Palmira Singh DPM at [address] [facility] EGD TRANSORAL BIOPSY SINGLE/MULTIPLE N/A 7/19/2017    EGD BIOPSY performed by Keely Cancino MD at [address] [facility]  07/19/2017    polypectomy    UPPER GASTROINTESTINAL ENDOSCOPY N/A 3/14/2019    EGD BIOPSY performed by Dilma Chacon MD at [address] [facility] N/A 5/20/2018    REPAIR AND REDUCTION OF RECURRENT INCARCERATED INCISIONAL HERNIA WITH MESH performed by Rigoberto Carney MD at [address] [facility]    Family History   Adopted: Yes       SOCIAL HISTORY    Social History     Tobacco Use    Smoking status: Never Smoker    Smokeless tobacco: Never Used   Substance Use Topics    Alcohol use: No     Comment: quit drinking 2012    Drug use: No       ALLERGIES    Allergies   Allergen Reactions    No Known Allergies            Objective:      BP (!) 154/50   Pulse 86   Temp 98.5 °F (36.9 °C) (Axillary)   Resp 24   Ht 5' 11\" (1.803 m)   Wt (!) 400 lb (181.4 kg)   SpO2 93%   BMI 55.79 kg/m²       LABS    CBC:   Lab Results   Component Value Date    WBC 10.0 11/05/2020    RBC 4.88 11/05/2020    RBC 3.93 04/02/2012    HGB 12.3 11/05/2020     CMP:  Albumin:    Lab Results   Component Value Date    LABALBU 3.3 11/04/2020    LABALBU 4.0 03/30/2012     PT/INR:    Lab Results   Component Value Date    PROTIME 10.5 07/31/2019    PROTIME 11.6 03/30/2012    INR 1.0 07/31/2019     HgBA1c:    Lab Results   Component Value Date    LABA1C 7.4 05/20/2018     PTT: No components found for: LABPTT    Review of Systems    Assessment:     Physical Exam      Fantasma Risk Score: Fantasma Scale Score: 10    Patient Active Problem List   Diagnosis Code    Alcoholic cirrhosis of liver ([facility] K70.30    Peripheral edema R60.9    Bipolar disorder ([facility] F31.9    Type 2 diabetes mellitus with diabetic neuropathy, with long-term current use of insulin ([facility]) E11.40, Z79.4    Essential hypertension I10    Hyperlipidemia E78.5    Weakness R53.1    Hyponatremia U95.1    Metabolic encephalopathy N78.50    FABI (obstructive sleep apnea) G47.33    Primary osteoarthritis of right knee M17.11    Type 2 diabetes mellitus with diabetic neuropathy ([facility]) E11.40    Hypothyroidism E03.9    Urine retention R33.9    Anemia D64.9    Cellulitis of right lower extremity L03.115    Slow transit constipation K59.01    Constipation K59.00    Iron deficiency anemia due to chronic blood loss D50.0    Gastroesophageal reflux disease without esophagitis K21.9    Secondary esophageal varices without bleeding ([facility]) I85.10    Portal hypertension ([facility]) K76.6    Morbid obesity with BMI of 50.0-59.9, adult ([facility]) E66.01, Z68.43    Venous stasis dermatitis of both lower extremities I87.2    Cellulitis of left lower extremity L03. 116    Cellulitis of perineum L03.315    Epididymoorchitis N45.3    Abdominal pain R10.9    Multiple and open wound of lower limb S81.809A    Scrotal edema N50.89    Retention, urine R33.9    SBO (small bowel obstruction) ([facility] K56.609    Incisional hernia with obstruction but no gangrene K43.0    Chronic indwelling Clemons catheter Z97.8    Anxiety and depression F41.9, F32.9    Back pain, chronic M54.9, G89.29    BPH (benign prostatic hyperplasia) N40.0    Chronic diastolic congestive heart failure (HCC) I50.32    Cirrhosis (HCC) K74.60    Diabetes mellitus (HCC) E11.9    GERD (gastroesophageal reflux disease) K21.9    H/O cardiac catheterization Z98.890    Hepatitis K75.9    Hiatal hernia K44.9    History of alcohol abuse F10.11    Hypertension I10    IBS (irritable bowel syndrome) K58.9    Morbid obesity (HCC) E66.01    Neuropathy G62.9    On home oxygen therapy Z99.81    Pancreatitis K85.90    Sleep apnea G47.30    Thyroid disease E07.9    Urinary bladder neurogenic dysfunction N31.9    Urinary tract infection associated with indwelling urethral catheter (HCC) T83.511A, N39.0    Delirium due to another medical condition F05    Musculoskeletal immobility Z74.09       Patient Active Problem List   Diagnosis    Alcoholic cirrhosis of liver (HCC)    Peripheral edema    Bipolar disorder (HCC)    Type 2 diabetes mellitus with diabetic neuropathy, with long-term current use of insulin (HCC)    Essential hypertension    Hyperlipidemia    Weakness    Hyponatremia    Metabolic encephalopathy    FABI (obstructive sleep apnea)    Primary osteoarthritis of right knee    Type 2 diabetes mellitus with diabetic neuropathy (HCC)    Hypothyroidism    Urine retention    Anemia    Cellulitis of right lower extremity    Slow transit constipation    Constipation    Iron deficiency anemia due to chronic blood loss    Gastroesophageal reflux disease without esophagitis    Secondary esophageal varices without bleeding (HCC)    Portal hypertension (HCC)    Morbid obesity with BMI of 50.0-59.9, adult (HCC)    Venous stasis dermatitis of both lower extremities    Cellulitis of left lower extremity    Cellulitis of perineum    Epididymoorchitis    Abdominal pain    Multiple and open wound of lower limb    Scrotal edema    Retention, urine    SBO (small bowel obstruction) ([facility]    Incisional hernia with Wound Cleansed Cleansed with saline 11/05/20 0843   Dressing/Treatment Foam 11/05/20 0843   Dressing Change Due 11/08/20 11/05/20 0843   Wound Length (cm) 0.7 cm 11/05/20 0843   Wound Width (cm) 0.5 cm 11/05/20 0843   Wound Depth (cm) 0.2 cm 11/05/20 0843   Wound Surface Area (cm^2) 0.35 cm^2 11/05/20 0843   Wound Volume (cm^3) 0.07 cm^3 11/05/20 0843   Wound Assessment Granulation tissue; Non-blanchable erythema 11/05/20 0843   Drainage Amount Moderate 11/05/20 0843   Drainage Description Serosanguinous 11/05/20 0843   Odor None 11/05/20 0843   Yokasta-wound Assessment Blanchable erythema 11/05/20 0843   Margins Attached edges; Defined edges 11/05/20 0843   Wound Thickness Description not for Pressure Injury Full thickness 11/05/20 0843   Number of days:      Left nare: pt found with glasses on and serosanguinous drainage coming from nare. His glasses were removed to reveal a pressure injury. Per nursing, the patient had been refusing to remove his glasses previously. He is not alert enough to refuse at this time. The wound bed appears to have granular tissue, making it a stage 3 pressure injury. The left posterior lower leg appears to have a venous stasis ulceration measuring 2x 2x 0.2cm with surrounding denudation/crusting likely due to pressure from skin folds rubbing together. The patient has 2+ edema both lower extremities. DP pulse palpable. Feet appear to have slight Charcot deformity. Positive Stemmer's sign which is indicative of lymphedema bilaterally. The patient has xerosis of entire lower leg and foot with some hyperkeratosis noted in patches on feet and upper gaiter region of leg. Small skin disruption near right anterior ankle: appears minor with unknown etiology, will monitor. Response to treatment:  Well tolerated by patient. Plan:     Plan of Care:     Left nare wound care:  cleanse with saline, pat dry. Use skin barrier wipe to perimeter.  Cut small piece of foam from Optifoam dressing to place over wound. Cover with a bandaid. Change q3 days and PRN if loose or soiled. LLE wound care:  cleanse with saline, pat dry. Maxorb Ag to cover wound. Reinforce with ABD pad, secure with roll gauze. Ace wrap both legs from just above toes to just under knees- begin with 4in wrap at feet, then 6in at ankle. Pt is on a low air loss mattress, needs to be on bariatric frame as his body habitus is not allowing for proper movement in the bed. -Turn every 2 hours    -Float heels of of bed with pillows under     -Mepilex sacrum dressing to sacrococcygeal area. Peel back dressing, inspect skin beneath, re-secure. Change every 72 hours and prn wrinkles, soilage. Discontinue Sacral Mepilex if  repeatedly soiled by incontinence.     -Routine incontinence care with Gulshan barrier cloths and zinc oxide cream. Apply zinc oxide cream BID and prn incontinence. Covidien moisture wicking under pads vs cloth backed briefs    -Static air overlay. Check inflation each shift by sliding hand under the air overlay. Feel for the patient's heaviest/ most dependant body part. Ideally 1/2 to 1\" of air will be between your hand and the patient's body. Add air prn. Specialty Bed Required : Yes   [x] Low Air Loss   [] Pressure Redistribution  [] Fluid Immersion  [] Bariatric  [] Total Pressure Relief  [] Other:     Current Diet: DIET CARB CONTROL; Low Sodium (2 GM);  Daily Fluid Restriction: 1800 ml  Dietician consult:  Yes    Discharge Plan:  Placement for patient upon discharge: skilled nursing   Patient appropriate for Outpatient [address]: Yes    Patient/Caregiver Teaching:  Level of patientunderstanding able to:     [] Indicates understanding       [x] Needs reinforcement  [x] Unsuccessful      [] Verbal Understanding  [] Demonstrated understanding       [] No evidence of learning  [] Refused teaching         [] N/A       Electronically signed by Jin Ford RN on  11/5/2020 at 8:47 AM

## 2020-11-05 NOTE — PLAN OF CARE
Problem: Skin Integrity:  Goal: Will show no infection signs and symptoms  Description: Will show no infection signs and symptoms  Outcome: Ongoing     Problem: Discharge Planning:  Goal: Patients continuum of care needs are met  Description: Patients continuum of care needs are met  Outcome: Ongoing     Problem: Sensory:  Goal: General experience of comfort will improve  Description: General experience of comfort will improve  Outcome: Ongoing     Problem: Falls - Risk of:  Goal: Will remain free from falls  Description: Will remain free from falls  Outcome: Ongoing

## 2020-11-05 NOTE — PROGRESS NOTES
Patient's o2 sat began to drop down to the 70's. Patient still disoriented at this time. Writer and fellow RN attempted to wake up patient. o2 sat switched to ear and oxygen increased from 2 liters to 4 liters. Patient is satting 96 and above. Plan to keep patient on 4 liters and wean as tolerated. Patient resting at this time once more. Will continue to monitor closely.

## 2020-11-05 NOTE — PROGRESS NOTES
Pt was moved to specialty air mattress, after being moved pt was SOB, on pulse ox spot check pt sat was 84%. Writer put pt on 2L/NC and continuous pulse ox and Marco Moulton NP notified as well as Corby Tee, . Pt states he wears oxygen at home but unsure of how many liters, pt admitted for AMS, does not know president, year, or .

## 2020-11-05 NOTE — CONSULTS
[facility]  Initial Consult Note  Date: 11/5/2020    Hospital day :1     Impression:   · Urinary tract infection in a patient with chronic indwelling Clemons catheter  · Metabolic encephalopathy secondary to above  · Urinary retention, patient has an indwelling Clemons catheter  · Morbid obesity  · CHF  · Diabetes mellitus    Recommendations   · Patient is currently on IV meropenem  · Urine culture is pending  · Blood cultures remain no growth to date  · Previous urine cultures did grow Enterococcus and E. Coli, most recent January 2020 from [address] [place] [address]  · We will transition patient to IV ciprofloxacin pending urine culture    Chief complaint/reason for consultation:   UTI    History of Present Illness:   Adriano Jessica is a 63y.o.-year-old male who was initially admitted on 11/4/2020. Patient has known medical history of morbid obesity, pancreatitis, urinary retention status post indwelling Clemons catheter placement, CHF, diabetes mellitus, hypertension, morbid obesity. Patient did have a urine culture at [facility] at August 2019 that was positive for Enterococcus, E. coli, Pseudomonas. He did have a urine culture at [address] [place] [address] in January 2020 + for E. coli and Enterococcus. I am not entirely clear on patient's baseline but he currently is lying in bed and unable to give me any history. Family is not at bedside. It is my understanding that patient lives at home with family and caregivers and he is not very active, pretty much just sits in the chair, goes to bed and goes to the restroom. According to the chart, patient has been confused for about 1 week with associated hallucinations. The confusion increased yesterday and usually when he gets like this family states he has a urinary tract infection so they brought patient to the emergency department. Patient has been started on meropenem.   We were consulted to assist with antimicrobial therapy choice    I have personally reviewed the past medical history, past surgical history, medications, social history, and family history, and I have updated the database accordingly. Past Medical History:     Past Medical History:   Diagnosis Date    Anxiety and depression     Back pain, chronic     BPH (benign prostatic hyperplasia)     CHF (congestive heart failure) (HCC)     Cirrhosis (HCC)     Diabetes mellitus (HCC)     not checking bloodsugar at home    GERD (gastroesophageal reflux disease)     H/O cardiac catheterization not sure of date    no stents    Hepatitis     Pt does not know the type.      Hiatal hernia     History of alcohol abuse     Sober since 2013    Hyperlipidemia     not taking any medication    Hypertension     IBS (irritable bowel syndrome)     Morbid obesity ([address]     Neuropathy     feet,toes    On home oxygen therapy     prn    Pancreatitis     x 5 episodes    Primary osteoarthritis of right knee     Sleep apnea     No machine    Thyroid disease     Urinary bladder neurogenic dysfunction      Past Surgical  History:     Past Surgical History:   Procedure Laterality Date    ABDOMEN SURGERY      hernia repair x4 (ventral)    COLONOSCOPY      2012    CYSTOSCOPY  01/24/2018    CYSTOSCOPY  02/20/2019    CYSTOSCOPY N/A 2/20/2019    CYSTOSCOPY DILATION performed by Vani Graham MD at [address] [facility] N/A 8/23/2019    CYSTOSCOPY/ DILATION NICOLE CATHETER EXCHANGE performed by Vani Graham MD at [address], COLON, DIAGNOSTIC     [address]  05/20/2018    repair and reduction of recurrent incarcerated incisional hernia with mesh    [place] CYSTOURETHROSCOPY N/A 1/24/2018    CYSTOSCOPY Janice Cruz performed by Vnai Graham MD at [address] Bilateral 8/22/2017    FOOT DEBRIDEMENT INCISION AND DRAINAGE with bone bx performed by Acosta Perea DPM at [address] [facility] EGD TRANSORAL BIOPSY SINGLE/MULTIPLE N/A 7/19/2017    EGD BIOPSY performed by Tiffanie Bhatt MD at [facility]  07/19/2017    polypectomy    UPPER GASTROINTESTINAL ENDOSCOPY N/A 3/14/2019    EGD BIOPSY performed by Alfred Banerjee MD at [address] [facility] N/A 5/20/2018    REPAIR AND REDUCTION OF RECURRENT INCARCERATED INCISIONAL HERNIA WITH MESH performed by Navdeep Lee MD at [facility]     Medications:      meropenem  1 g Intravenous Q8H    sodium chloride flush  10 mL Intravenous 2 times per day    enoxaparin  40 mg Subcutaneous BID    aspirin  81 mg Oral Daily    tamsulosin  0.4 mg Oral Daily    miconazole   Topical BID    pantoprazole  40 mg Oral QAM AC    venlafaxine  150 mg Oral Daily    bumetanide  2 mg Oral BID    bethanechol  10 mg Oral TID    DULoxetine  60 mg Oral QAM    levothyroxine  150 mcg Oral Daily    metFORMIN  500 mg Oral BID    nadolol  40 mg Oral Daily    pregabalin  150 mg Oral BID    alogliptin  25 mg Oral Daily    rifaximin  550 mg Oral BID    oxybutynin  5 mg Oral BID    naloxegol  25 mg Oral QAM    spironolactone  75 mg Oral BID    potassium chloride  20 mEq Oral Daily with breakfast    insulin glargine  80 Units Subcutaneous BID    insulin lispro  0-6 Units Subcutaneous TID WC    insulin lispro  0-3 Units Subcutaneous Nightly     Social History:     Social History     Socioeconomic History    Marital status:      Spouse name: Not on file    Number of children: Not on file    Years of education: Not on file    Highest education level: Not on file   Occupational History    Not on file   Social Needs    Financial resource strain: Not on file    Food insecurity     Worry: Not on file     Inability: Not on file    Transportation needs     Medical: Not on file     Non-medical: Not on file   Tobacco Use    Smoking status: Never Smoker    Smokeless tobacco: Never Used   Substance and Sexual Activity    Alcohol use: No     Comment: quit drinking 2012    Drug use: No    Sexual activity: Not on file   Lifestyle    Physical activity     Days per week: Not on file     Minutes per session: Not on file    Stress: Not on file   Relationships    Social connections     Talks on phone: Not on file     Gets together: Not on file     Attends Yarsanism service: Not on file     Active member of club or organization: Not on file     Attends meetings of clubs or organizations: Not on file     Relationship status: Not on file    Intimate partner violence     Fear of current or ex partner: Not on file     Emotionally abused: Not on file     Physically abused: Not on file     Forced sexual activity: Not on file   Other Topics Concern    Not on file   Social History Narrative    Not on file     Family History:     Family History   Adopted: Yes      Allergies:   No known allergies     Review of Systems:     Unable to perform review of systems, patient does not currently wake up. Nursing staff states he has been confused, was awake and communicative minimally earlier. Physical Examination :   BP (!) 160/67   Pulse 84   Temp 98.4 °F (36.9 °C) (Axillary)   Resp 20   Ht 5' 11\" (1.803 m)   Wt (!) 400 lb (181.4 kg)   SpO2 97%   BMI 55.79 kg/m²     Temperature Range: Temp: 98.4 °F (36.9 °C) Temp  Av.3 °F (36.8 °C)  Min: 97.1 °F (36.2 °C)  Max: 98.8 °F (37.1 °C)  General Appearance: Lethargic, currently does not communicate  Head: Normocephalic, without obvious abnormality, atraumatic  Eyes: Pupils equal, round, reactive, to light and accommodation; extraocular movements intact; sclera anicteric; conjunctivae pink  ENT: Oropharynx clear, without erythema, exudate, or thrush. Neck: Supple, without lymphadenopathy. Pulmonary/Chest: Clear to auscultation, without wheezes, rales, or rhonchi  Cardiovascular: Regular rate and rhythm without murmurs, rubs, or gallops. Abdomen: Obese. Soft, nontender, nondistended. Extremities: Bilateral lower extremity stasis dermatitis. hemorrhage, mass effect or midline shift. No abnormal extra-axial fluid collection. The gray-white differentiation is maintained without evidence of an acute infarct. No evidence of hydrocephalus. ORBITS: The visualized portion of the orbits demonstrate no acute abnormality. SINUSES: The visualized paranasal sinuses and mastoid air cells appear clear. SOFT TISSUES/SKULL:  No acute abnormality of the visualized skull or soft tissues. No acute intracranial abnormality. Xr Chest Portable    Result Date: 11/4/2020  EXAMINATION: ONE XRAY VIEW OF THE CHEST 11/4/2020 12:05 pm COMPARISON: 4/11/2019 HISTORY: ORDERING SYSTEM PROVIDED HISTORY: Chest Pain TECHNOLOGIST PROVIDED HISTORY: Chest Pain Reason for Exam: AMS Acuity: Acute Type of Exam: Initial FINDINGS: No focal consolidation. Small right lung base opacity. Cardiomegaly. No pulmonary edema. Small right lung base opacity is nonspecific but likely atelectasis and a pleural effusion. Pneumonia is not excluded but felt to be less likely. Cultures:     Culture, Blood 1 [[id number]]   Collected: 11/04/20 1235    Order Status: Completed  Specimen: Blood  Updated: 11/05/20 1117     Specimen Description  . BLOOD     Special Requests  10ML LH     Culture  NO GROWTH 20 HOURS    Culture, Blood 1 [[id number]]   Collected: 11/04/20 1144    Order Status: Completed  Specimen: Blood  Updated: 11/05/20 1117     Specimen Description  . BLOOD     Special Requests  RIGHT HAND, 10ML, LR     Culture  NO GROWTH 20 HOURS    Culture, Urine [[id number]]   Collected: 11/04/20 1146    Order Status: Completed  Specimen: Urine, clean catch  Updated: 11/05/20 0937     Specimen Description  . CLEAN CATCH URINE     Special Requests  NOT REPORTED     Culture  CULTURE IN PROGRESS            Thank you for allowing us to participate in the care of this patient. Please call with questions.     Electronically signed by SUNSHINE Clarke CNP on 11/5/2020 at 2:34 PM      [facility]  [address] [place] [address]  [email or web address] messaging  OFFICE: [phone]      This note is created with the assistance of a speech recognition program.  While intending to generate a document that actually reflects the content of the visit, the document can still have some errors including those of syntax and sound a like substitutions which may escape proof reading. In such instances, actual meaning can be extrapolated by contextual diversion.

## 2020-11-05 NOTE — PROGRESS NOTES
Patient disoriented this morning related to UTI. Able to follow some commands but not able to answer questions. Writer attempted to give small sips of water to patient as well as small bites of applesauce. Patient needing frequent reminders to swallow food and liquids. Writer reached out to [employer] NP. Carisa stated to hold off on meds this morning until patient is more coherent. IV meds and PRN meds ordered as well (see Mar for detail). At this time patient is resting in bed with eyes closed, will continue to monitor patient closely.

## 2020-11-06 ENCOUNTER — APPOINTMENT (OUTPATIENT)
Dept: GENERAL RADIOLOGY | Age: 55
DRG: 689 | End: 2020-11-06
Payer: MEDICARE

## 2020-11-06 ENCOUNTER — APPOINTMENT (OUTPATIENT)
Dept: CT IMAGING | Age: 55
DRG: 689 | End: 2020-11-06
Payer: MEDICARE

## 2020-11-06 LAB
-: ABNORMAL
ABSOLUTE EOS #: 0.09 K/UL (ref 0–0.4)
ABSOLUTE IMMATURE GRANULOCYTE: ABNORMAL K/UL (ref 0–0.3)
ABSOLUTE LYMPH #: 1.17 K/UL (ref 1–4.8)
ABSOLUTE MONO #: 0.96 K/UL (ref 0.2–0.8)
AMORPHOUS: ABNORMAL
ANION GAP SERPL CALCULATED.3IONS-SCNC: 6 MMOL/L (ref 9–17)
BACTERIA: ABNORMAL
BASOPHILS # BLD: 1 % (ref 0–2)
BASOPHILS ABSOLUTE: 0.06 K/UL (ref 0–0.2)
BILIRUBIN URINE: NEGATIVE
BUN BLDV-MCNC: 10 MG/DL (ref 6–20)
BUN/CREAT BLD: 16 (ref 9–20)
C-REACTIVE PROTEIN: 42.3 MG/L (ref 0–5)
CALCIUM SERPL-MCNC: 8.9 MG/DL (ref 8.6–10.4)
CASTS UA: ABNORMAL /LPF
CHLORIDE BLD-SCNC: 98 MMOL/L (ref 98–107)
CO2: 36 MMOL/L (ref 20–31)
COLOR: YELLOW
COMMENT UA: ABNORMAL
CREAT SERPL-MCNC: 0.63 MG/DL (ref 0.7–1.2)
CRYSTALS, UA: ABNORMAL /HPF
CULTURE: ABNORMAL
DIFFERENTIAL TYPE: ABNORMAL
EOSINOPHILS RELATIVE PERCENT: 1 % (ref 1–4)
EPITHELIAL CELLS UA: ABNORMAL /HPF (ref 0–5)
FIO2: 30
GFR AFRICAN AMERICAN: >60 ML/MIN
GFR NON-AFRICAN AMERICAN: >60 ML/MIN
GFR SERPL CREATININE-BSD FRML MDRD: ABNORMAL ML/MIN/{1.73_M2}
GFR SERPL CREATININE-BSD FRML MDRD: ABNORMAL ML/MIN/{1.73_M2}
GLUCOSE BLD-MCNC: 172 MG/DL (ref 75–110)
GLUCOSE BLD-MCNC: 172 MG/DL (ref 75–110)
GLUCOSE BLD-MCNC: 179 MG/DL (ref 75–110)
GLUCOSE BLD-MCNC: 189 MG/DL (ref 70–99)
GLUCOSE BLD-MCNC: 200 MG/DL (ref 75–110)
GLUCOSE URINE: NEGATIVE
HCT VFR BLD CALC: 38.2 % (ref 40.7–50.3)
HCT VFR BLD CALC: 40.6 % (ref 41–53)
HEMOGLOBIN: 11.2 G/DL (ref 13–17)
HEMOGLOBIN: 11.7 G/DL (ref 13.5–17.5)
IMMATURE GRANULOCYTES: ABNORMAL %
KETONES, URINE: ABNORMAL
LACTIC ACID: 2 MMOL/L (ref 0.5–2.2)
LEUKOCYTE ESTERASE, URINE: ABNORMAL
LYMPHOCYTES # BLD: 13 % (ref 24–44)
Lab: ABNORMAL
MAGNESIUM: 1.6 MG/DL (ref 1.6–2.6)
MCH RBC QN AUTO: 25.3 PG (ref 26–34)
MCH RBC QN AUTO: 25.5 PG (ref 25.2–33.5)
MCHC RBC AUTO-ENTMCNC: 28.8 G/DL (ref 31–37)
MCHC RBC AUTO-ENTMCNC: 29.3 G/DL (ref 28.4–34.8)
MCV RBC AUTO: 87 FL (ref 82.6–102.9)
MCV RBC AUTO: 87.7 FL (ref 80–100)
MONOCYTES # BLD: 10 % (ref 1–7)
MUCUS: ABNORMAL
NEGATIVE BASE EXCESS, ART: ABNORMAL (ref 0–2)
NITRITE, URINE: POSITIVE
NRBC AUTOMATED: 0 PER 100 WBC
NRBC AUTOMATED: ABNORMAL PER 100 WBC
O2 DEVICE/FLOW/%: ABNORMAL
OTHER OBSERVATIONS UA: ABNORMAL
PATIENT TEMP: 37.1
PDW BLD-RTO: 15 % (ref 11.8–14.4)
PDW BLD-RTO: 15.1 % (ref 11.5–14.5)
PH UA: 8.5 (ref 5–8)
PLATELET # BLD: 133 K/UL (ref 130–400)
PLATELET # BLD: 133 K/UL (ref 138–453)
PLATELET ESTIMATE: ABNORMAL
PMV BLD AUTO: 10.9 FL (ref 8.1–13.5)
PMV BLD AUTO: ABNORMAL FL (ref 6–12)
POC HCO3: 34.9 MMOL/L (ref 22–27)
POC O2 SATURATION: 93 %
POC PCO2 TEMP: ABNORMAL MM HG
POC PCO2: 54 MM HG (ref 32–45)
POC PH TEMP: ABNORMAL
POC PH: 7.42 (ref 7.35–7.45)
POC PO2 TEMP: ABNORMAL MM HG
POC PO2: 69 MM HG (ref 75–95)
POSITIVE BASE EXCESS, ART: 10 (ref 0–2)
POTASSIUM SERPL-SCNC: 3.5 MMOL/L (ref 3.7–5.3)
PROCALCITONIN: 0.15 NG/ML
PROTEIN UA: ABNORMAL
RBC # BLD: 4.39 M/UL (ref 4.21–5.77)
RBC # BLD: 4.63 M/UL (ref 4.5–5.9)
RBC # BLD: ABNORMAL 10*6/UL
RBC UA: ABNORMAL /HPF (ref 0–2)
RENAL EPITHELIAL, UA: ABNORMAL /HPF
SEDIMENTATION RATE, ERYTHROCYTE: 77 MM (ref 0–20)
SEG NEUTROPHILS: 75 % (ref 36–66)
SEGMENTED NEUTROPHILS ABSOLUTE COUNT: 6.96 K/UL (ref 1.8–7.7)
SODIUM BLD-SCNC: 140 MMOL/L (ref 135–144)
SPECIFIC GRAVITY UA: 1.01 (ref 1–1.03)
SPECIMEN DESCRIPTION: ABNORMAL
TCO2 (CALC), ART: 37 MMOL/L (ref 23–28)
THYROXINE, FREE: 0.88 NG/DL (ref 0.93–1.7)
TRICHOMONAS: ABNORMAL
TSH SERPL DL<=0.05 MIU/L-ACNC: 10.4 MIU/L (ref 0.3–5)
TURBIDITY: ABNORMAL
URINE HGB: ABNORMAL
UROBILINOGEN, URINE: ABNORMAL
WBC # BLD: 9.3 K/UL (ref 3.5–11)
WBC # BLD: 9.7 K/UL (ref 3.5–11.3)
WBC # BLD: ABNORMAL 10*3/UL
WBC UA: ABNORMAL /HPF (ref 0–5)
YEAST: ABNORMAL

## 2020-11-06 PROCEDURE — 6360000002 HC RX W HCPCS: Performed by: NURSE PRACTITIONER

## 2020-11-06 PROCEDURE — 71045 X-RAY EXAM CHEST 1 VIEW: CPT

## 2020-11-06 PROCEDURE — 2060000000 HC ICU INTERMEDIATE R&B

## 2020-11-06 PROCEDURE — 84145 PROCALCITONIN (PCT): CPT

## 2020-11-06 PROCEDURE — 84443 ASSAY THYROID STIM HORMONE: CPT

## 2020-11-06 PROCEDURE — 2700000000 HC OXYGEN THERAPY PER DAY

## 2020-11-06 PROCEDURE — 86140 C-REACTIVE PROTEIN: CPT

## 2020-11-06 PROCEDURE — 85027 COMPLETE CBC AUTOMATED: CPT

## 2020-11-06 PROCEDURE — 51702 INSERT TEMP BLADDER CATH: CPT

## 2020-11-06 PROCEDURE — 81001 URINALYSIS AUTO W/SCOPE: CPT

## 2020-11-06 PROCEDURE — 2500000003 HC RX 250 WO HCPCS: Performed by: NURSE PRACTITIONER

## 2020-11-06 PROCEDURE — 99232 SBSQ HOSP IP/OBS MODERATE 35: CPT | Performed by: INTERNAL MEDICINE

## 2020-11-06 PROCEDURE — 6370000000 HC RX 637 (ALT 250 FOR IP): Performed by: UROLOGY

## 2020-11-06 PROCEDURE — 83735 ASSAY OF MAGNESIUM: CPT

## 2020-11-06 PROCEDURE — 85652 RBC SED RATE AUTOMATED: CPT

## 2020-11-06 PROCEDURE — 83605 ASSAY OF LACTIC ACID: CPT

## 2020-11-06 PROCEDURE — 87086 URINE CULTURE/COLONY COUNT: CPT

## 2020-11-06 PROCEDURE — 94761 N-INVAS EAR/PLS OXIMETRY MLT: CPT

## 2020-11-06 PROCEDURE — 82947 ASSAY GLUCOSE BLOOD QUANT: CPT

## 2020-11-06 PROCEDURE — 84439 ASSAY OF FREE THYROXINE: CPT

## 2020-11-06 PROCEDURE — 80048 BASIC METABOLIC PNL TOTAL CA: CPT

## 2020-11-06 PROCEDURE — 36600 WITHDRAWAL OF ARTERIAL BLOOD: CPT

## 2020-11-06 PROCEDURE — 87186 SC STD MICRODIL/AGAR DIL: CPT

## 2020-11-06 PROCEDURE — 87077 CULTURE AEROBIC IDENTIFY: CPT

## 2020-11-06 PROCEDURE — 70450 CT HEAD/BRAIN W/O DYE: CPT

## 2020-11-06 PROCEDURE — 2580000003 HC RX 258: Performed by: NURSE PRACTITIONER

## 2020-11-06 PROCEDURE — 82803 BLOOD GASES ANY COMBINATION: CPT

## 2020-11-06 PROCEDURE — 36415 COLL VENOUS BLD VENIPUNCTURE: CPT

## 2020-11-06 PROCEDURE — 99223 1ST HOSP IP/OBS HIGH 75: CPT | Performed by: PSYCHIATRY & NEUROLOGY

## 2020-11-06 PROCEDURE — 94660 CPAP INITIATION&MGMT: CPT

## 2020-11-06 RX ORDER — LIDOCAINE HYDROCHLORIDE 20 MG/ML
JELLY TOPICAL PRN
Status: DISCONTINUED | OUTPATIENT
Start: 2020-11-06 | End: 2020-11-10 | Stop reason: HOSPADM

## 2020-11-06 RX ORDER — KETOCONAZOLE 20 MG/ML
SHAMPOO TOPICAL
Status: ON HOLD | COMMUNITY
End: 2022-01-17

## 2020-11-06 RX ORDER — GLIPIZIDE 10 MG/1
10 TABLET ORAL
Status: ON HOLD | COMMUNITY
End: 2021-01-14

## 2020-11-06 RX ORDER — DIPHENOXYLATE HYDROCHLORIDE AND ATROPINE SULFATE 2.5; .025 MG/1; MG/1
1 TABLET ORAL 4 TIMES DAILY PRN
Status: ON HOLD | COMMUNITY
End: 2022-01-21 | Stop reason: HOSPADM

## 2020-11-06 RX ORDER — QUETIAPINE FUMARATE 50 MG/1
50 TABLET, EXTENDED RELEASE ORAL NIGHTLY
COMMUNITY

## 2020-11-06 RX ORDER — FLUOCINONIDE TOPICAL SOLUTION USP, 0.05% 0.5 MG/ML
SOLUTION TOPICAL PRN
Status: ON HOLD | COMMUNITY
End: 2022-01-17

## 2020-11-06 RX ORDER — TRIAMCINOLONE ACETONIDE 0.25 MG/G
CREAM TOPICAL 2 TIMES DAILY PRN
Status: ON HOLD | COMMUNITY
End: 2022-01-17

## 2020-11-06 RX ADMIN — ENOXAPARIN SODIUM 40 MG: 40 INJECTION SUBCUTANEOUS at 21:27

## 2020-11-06 RX ADMIN — METOPROLOL TARTRATE 5 MG: 5 INJECTION INTRAVENOUS at 23:50

## 2020-11-06 RX ADMIN — ENOXAPARIN SODIUM 40 MG: 40 INJECTION SUBCUTANEOUS at 15:10

## 2020-11-06 RX ADMIN — METOPROLOL TARTRATE 5 MG: 5 INJECTION INTRAVENOUS at 18:05

## 2020-11-06 RX ADMIN — CIPROFLOXACIN 400 MG: 2 INJECTION, SOLUTION INTRAVENOUS at 16:46

## 2020-11-06 RX ADMIN — POTASSIUM CHLORIDE 10 MEQ: 10 INJECTION, SOLUTION INTRAVENOUS at 12:36

## 2020-11-06 RX ADMIN — CIPROFLOXACIN 400 MG: 2 INJECTION, SOLUTION INTRAVENOUS at 03:27

## 2020-11-06 RX ADMIN — POTASSIUM CHLORIDE 10 MEQ: 10 INJECTION, SOLUTION INTRAVENOUS at 14:57

## 2020-11-06 RX ADMIN — ANTI-FUNGAL POWDER MICONAZOLE NITRATE TALC FREE: 1.42 POWDER TOPICAL at 21:27

## 2020-11-06 RX ADMIN — POTASSIUM CHLORIDE 10 MEQ: 10 INJECTION, SOLUTION INTRAVENOUS at 09:41

## 2020-11-06 RX ADMIN — ANTI-FUNGAL POWDER MICONAZOLE NITRATE TALC FREE: 1.42 POWDER TOPICAL at 12:36

## 2020-11-06 RX ADMIN — POTASSIUM CHLORIDE 10 MEQ: 10 INJECTION, SOLUTION INTRAVENOUS at 07:16

## 2020-11-06 RX ADMIN — LIDOCAINE HYDROCHLORIDE: 20 JELLY TOPICAL at 19:08

## 2020-11-06 RX ADMIN — SODIUM CHLORIDE: 9 INJECTION, SOLUTION INTRAVENOUS at 19:08

## 2020-11-06 ASSESSMENT — PAIN SCALES - GENERAL
PAINLEVEL_OUTOF10: 0
PAINLEVEL_OUTOF10: 0

## 2020-11-06 NOTE — CONSULTS
[facility]  Urology Consultation    Patient:  Elliott Akers  MRN: [id number]  YOB: 1965    CHIEF COMPLAINT: Patient seen in conjunction with nursing staff    I received a call from the emergency room about this patient being admitted   recurrent UTI    HISTORY OF PRESENT ILLNESS:   The patient is a 63 y.o. male who presents with chronic indwelling Nicole, we have taking care of this patient periodically with urinary retention urethral stricture and episodes of infection    The patient is poorly responsive to questions this evening    Patient's old records, notes and chart reviewed and summarized above. Past Medical History:    Past Medical History:   Diagnosis Date    Anxiety and depression     Back pain, chronic     BPH (benign prostatic hyperplasia)     CHF (congestive heart failure) (HCC)     Cirrhosis (HCC)     Diabetes mellitus (HCC)     not checking bloodsugar at home    GERD (gastroesophageal reflux disease)     H/O cardiac catheterization not sure of date    no stents    Hepatitis     Pt does not know the type.      Hiatal hernia     History of alcohol abuse     Sober since 2013    Hyperlipidemia     not taking any medication    Hypertension     IBS (irritable bowel syndrome)     Morbid obesity ([address]     Neuropathy     feet,toes    On home oxygen therapy     prn    Pancreatitis     x 5 episodes    Primary osteoarthritis of right knee     Sleep apnea     No machine    Thyroid disease     Urinary bladder neurogenic dysfunction        Past Surgical History:    Past Surgical History:   Procedure Laterality Date    ABDOMEN SURGERY      hernia repair x4 (ventral)    COLONOSCOPY      2012    CYSTOSCOPY  01/24/2018    CYSTOSCOPY  02/20/2019    CYSTOSCOPY N/A 2/20/2019    CYSTOSCOPY DILATION performed by Tray Hendrickson MD at [facility] [address] [place] [address] 8/23/2019    CYSTOSCOPY/ DILATION NICOLE CATHETER EXCHANGE performed by Tray Hendrickson MD at [facility], COLON, DIAGNOSTIC      HERNIA REPAIR      INCISIONAL HERNIA REPAIR  05/20/2018    repair and reduction of recurrent incarcerated incisional hernia with mesh    [facility] CYSTOU[facility]ROSCOPY N/A 1/24/2018    CYSTOSCOPY Seta Neema performed by Allean Cogan, MD at [address] Bilateral 8/22/2017    FOOT DEBRIDEMENT INCISION AND DRAINAGE with bone bx performed by Caty Olivas DPM at [address] EGD TRANSORAL BIOPSY SINGLE/MULTIPLE N/A 7/19/2017    EGD BIOPSY performed by Sahara Manriquez MD at [facility]  07/19/2017    polypectomy    UPPER GASTROINTESTINAL ENDOSCOPY N/A 3/14/2019    EGD BIOPSY performed by Shweta Powell MD at [facility] N/A 5/20/2018    REPAIR AND REDUCTION OF RECURRENT INCARCERATED INCISIONAL HERNIA WITH MESH performed by Denisha Brand MD at [facility]     Previous  surgery: Cystoscopy urethral dilatation evacuation of pyocystis     Medications:    Scheduled Meds:   ciprofloxacin  400 mg Intravenous Q12H    sodium chloride flush  10 mL Intravenous 2 times per day    enoxaparin  40 mg Subcutaneous BID    aspirin  81 mg Oral Daily    tamsulosin  0.4 mg Oral Daily    miconazole   Topical BID    pantoprazole  40 mg Oral QAM AC    venlafaxine  150 mg Oral Daily    bumetanide  2 mg Oral BID    bethanechol  10 mg Oral TID    DULoxetine  60 mg Oral QAM    levothyroxine  150 mcg Oral Daily    metFORMIN  500 mg Oral BID    nadolol  40 mg Oral Daily    pregabalin  150 mg Oral BID    alogliptin  25 mg Oral Daily    rifaximin  550 mg Oral BID    oxybutynin  5 mg Oral BID    naloxegol  25 mg Oral QAM    spironolactone  75 mg Oral BID    potassium chloride  20 mEq Oral Daily with breakfast    insulin glargine  80 Units Subcutaneous BID    insulin lispro  0-6 Units Subcutaneous TID WC    insulin lispro  0-3 Units Subcutaneous Nightly     Continuous Infusions:   sodium chloride 75 mL/hr at 11/05/20 2205    dextrose       PRN Meds:.metoprolol, potassium chloride, LORazepam, sodium chloride flush, acetaminophen **OR** acetaminophen, polyethylene glycol, promethazine **OR** ondansetron, nicotine, albuterol sulfate HFA, clonazePAM, oxyCODONE-acetaminophen, hydrOXYzine, ibuprofen, glucose, dextrose, glucagon (rDNA), dextrose, albuterol    Allergies:  No known allergies    Social History:    Social History     Socioeconomic History    Marital status:      Spouse name: Not on file    Number of children: Not on file    Years of education: Not on file    Highest education level: Not on file   Occupational History    Not on file   Social Needs    Financial resource strain: Not on file    Food insecurity     Worry: Not on file     Inability: Not on file    Transportation needs     Medical: Not on file     Non-medical: Not on file   Tobacco Use    Smoking status: Never Smoker    Smokeless tobacco: Never Used   Substance and Sexual Activity    Alcohol use: No     Comment: quit drinking 2012    Drug use: No    Sexual activity: Not on file   Lifestyle    Physical activity     Days per week: Not on file     Minutes per session: Not on file    Stress: Not on file   Relationships    Social connections     Talks on phone: Not on file     Gets together: Not on file     Attends Anabaptism service: Not on file     Active member of club or organization: Not on file     Attends meetings of clubs or organizations: Not on file     Relationship status: Not on file    Intimate partner violence     Fear of current or ex partner: Not on file     Emotionally abused: Not on file     Physically abused: Not on file     Forced sexual activity: Not on file   Other Topics Concern    Not on file   Social History Narrative    Not on file       Family History:    Family History   Adopted: Yes     Previous Urologic Family history: none    REVIEW OF SYSTEMS: Patient unable to answer questions  Constitutional: Review of system compromised by the patient's mental status negative  Eyes: negative  Respiratory: negative  Cardiovascular: negative  Gastrointestinal: negative  Genitourinary: see HPI  Musculoskeletal: negative  Skin: negative   Neurological: See history of present illness  Hematological/Lymphatic: negative  Psychological: See history of present illness    Physical Exam:    This a 63 y.o. male   Patient Vitals for the past 24 hrs:   BP Temp Temp src Pulse Resp SpO2 Height   11/06/20 1745 (!) 173/60 -- -- 89 -- 96 % --   11/06/20 1533 (!) 181/61 98.8 °F (37.1 °C) Axillary 84 20 92 % --   11/06/20 1515 -- -- -- -- 18 96 % --   11/06/20 1330 -- -- -- -- -- -- 5' 11\" (1.803 m)   11/06/20 1151 (!) 167/60 98.2 °F (36.8 °C) Axillary 75 20 97 % --   11/06/20 1151 -- -- -- -- -- 96 % --   11/06/20 0824 -- -- -- -- 19 96 % --   11/06/20 0746 137/89 98.6 °F (37 °C) -- 91 26 97 % --   11/06/20 0615 -- -- -- 80 -- 99 % --   11/06/20 0413 -- -- -- -- 17 -- --   11/06/20 0330 (!) 147/58 99 °F (37.2 °C) Axillary 68 15 97 % --   11/05/20 2259 (!) 152/60 98.7 °F (37.1 °C) Axillary 71 17 97 % --   11/05/20 2102 (!) 152/60 99.1 °F (37.3 °C) Oral 86 18 97 % --     Constitutional: Patient in no acute distress; Neuro: alert and oriented to person place and time. Psych: Mood and affect normal.  Skin: Normal  Lungs: Respiratory effort normal  Cardiovascular:  Normal peripheral pulses  Abdomen: Soft, non-tender, non-distended with no CVA, flank pain, hepatosplenomegaly or hernia. Kidneys normal.  Bladder non-tender and not distended.   Lymphatics: no palpable lymphadenopathy  Penis normal and circumcised  Urethral meatus normal  Scrotal exam normal  Testicles normal bilaterally  Clemons catheter in place, bladder decompressed no epididymitis or orchitis  LABS:  Recent Labs     11/05/20  0626 11/05/20  2317 11/06/20  0607   WBC 10.0 9.3 9.7   HGB 12.3* 11.7* 11.2*   HCT 41.3 40.6* 38.2*   MCV 84.6 87.7 87.0    133 133*     Recent Labs     11/05/20  0626 11/05/20  2317 11/06/20  0607    140 140   K 3.2* 3.5* 3.5*   CL 95* 97* 98   CO2 36* 34* 36*   BUN 12 10 10   CREATININE 0.70 0.74 0.63*     No results found for: PSA    Additional Lab/culture results:    Urinalysis:   Recent Labs     11/06/20  1230   Ganesh 8.5*   WBCUA 10 TO 20   RBCUA 2 TO 5   MUCUS NOT REPORTED   TRICHOMONAS NOT REPORTED   YEAST NOT REPORTED   BACTERIA MODERATE*   SPECGRAV 1.010   LEUKOCYTESUR SMALL*   UROBILINOGEN ELEVATED*   BILIRUBINUR NEGATIVE        -----------------------------------------------------------------  Imaging Results:    Assessment and Plan   Impression:    Patient Active Problem List   Diagnosis    Al[facility] cirrhosis of liver ([facility]    Peripheral edema    Bipolar disorder ([facility]    Type 2 diabetes mellitus with diabetic neuropathy, with long-term current use of insulin (HCC)    Essential hypertension    Hyperlipidemia    Weakness    Hyponatremia    Metabolic encephalopathy    FABI (obstructive sleep apnea)    Primary osteoarthritis of right knee    Type 2 diabetes mellitus with diabetic neuropathy (HCC)    Hypothyroidism    Urine retention    Anemia    Cellulitis of right lower extremity    Slow transit constipation    Constipation    Iron deficiency anemia due to chronic blood loss    Gastroesophageal reflux disease without esophagitis    Secondary esophageal varices without bleeding (HCC)    Portal hypertension (HCC)    Morbid obesity with BMI of 50.0-59.9, adult (HCC)    Venous stasis dermatitis of both lower extremities    Cellulitis of left lower extremity    Cellulitis of perineum    Epididymoorchitis    Abdominal pain    Multiple and open wound of lower limb    Scrotal edema    Retention, urine    SBO (small bowel obstruction) ([facility]    Incisional hernia with obstruction but no gangrene    Chronic indwelling Clemons catheter    Anxiety and depression    Back pain, chronic    BPH (benign

## 2020-11-06 NOTE — PROGRESS NOTES
Transitions of Care Pharmacy Service   Medication Review    The patient's list of current home medications has been reviewed. Source(s) of information: family, [facility], PCP office (Dr Dominique Shipley)    Other Notes Meds are bubblepacked by [address] REQUESTED  Medications that need to be addressed by a physician/nurse practitioner:    Medication Action Requested   Bethanechol 10mg,  Lorazepam 1mg prn,  Movantik 25mg,  Xifaxan 550mg,  Spironolactone 75mg   Not current  home meds (old prescriptions) -- please discontinue orders as appropriate, otherwise he will new prescriptions to resume at discharge     Synthroid 150mcg   Takes 175mcg daily instead at home -- please adjust order to match home regimen as appropriate      Seroquel XR 50mg and Glipizide 10mg need review/reorder as appropriate           Please feel free to call me with any questions about this encounter. Thank you. Errol Palumbo   Transitions of Care Pharmacy Service  Phone:  [phone]  Fax: [phone]      Electronically signed by Errol Palumbo on 11/6/2020 at 3:33 PM           Medications Prior to Admission:   QUEtiapine (SEROQUEL XR) 50 MG extended release tablet, Take 50 mg by mouth nightly  glipiZIDE (GLUCOTROL) 10 MG tablet, Take 10 mg by mouth 2 times daily (before meals)  triamcinolone (KENALOG) 0.025 % cream, Apply topically 2 times daily as needed  diphenoxylate-atropine (LOMOTIL) 2.5-0.025 MG per tablet, Take 1 tablet by mouth 4 times daily as needed for Diarrhea.   fluocinonide (LIDEX) 0.05 % external solution, Apply topically as needed (scalp itching)  ketoconazole (NIZORAL) 2 % shampoo, Apply topically Twice a Week  potassium chloride (KLOR-CON) 10 MEQ extended release tablet, Take 20 mEq by mouth daily (with breakfast) Takes 2 tabs (=20mEq) daily  albuterol sulfate HFA (PROAIR HFA) 108 (90 Base) MCG/ACT inhaler, Inhale 2 puffs into the lungs every 6 hours as needed for Wheezing  oxyCODONE-acetaminophen (PERCOCET) 7.5-325 MG per tablet, Take 1 tablet by mouth every 6 hours as needed for Pain. Insulin Glargine (BASAGLAR KWIKPEN SC), Inject 76 Units into the skin 2 times daily   tamsulosin (FLOMAX) 0.4 MG capsule, Take 1 capsule by mouth daily  hydrOXYzine (ATARAX) 25 MG tablet, Take 25 mg by mouth 3 times daily as needed for Itching  oxybutynin (DITROPAN) 5 MG tablet, Take 1 tablet by mouth 2 times daily  bumetanide (BUMEX) 2 MG tablet, Take 2 mg by mouth 2 times daily  metFORMIN (GLUCOPHAGE) 500 MG tablet, Take 500 mg by mouth 2 times daily   ibuprofen (ADVIL;MOTRIN) 600 MG tablet, Take 600 mg by mouth every 6 hours as needed for Pain  aspirin 81 MG EC tablet, Take 1 tablet by mouth daily  SITagliptin (JANUVIA) 100 MG tablet, Take 1 tablet by mouth daily  nadolol (CORGARD) 40 MG tablet, Take 40 mg by mouth daily  levothyroxine (SYNTHROID) 175 MCG tablet, Take 175 mcg by mouth Daily   nystatin (MYCOSTATIN) 923072 UNIT/GM powder, Apply topically 2 times daily as needed TO ABDOMINAL FOLDS, GROIN   esomeprazole (NEXIUM) 40 MG capsule, Take 40 mg by mouth 2 times daily   DULoxetine (CYMBALTA) 60 MG capsule, Take 60 mg by mouth every morning   pregabalin (LYRICA) 150 MG capsule, Take 150 mg by mouth 2 times daily  clonazePAM (KLONOPIN) 1 MG tablet, Take 1 mg by mouth 2 times daily as needed for Anxiety. venlafaxine (EFFEXOR-XR) 150 MG XR capsule, Take 150 mg by mouth daily.

## 2020-11-06 NOTE — CONSULTS
[facility] Neurology   IN-PATIENT SERVICE      NEUROLOGY CONSULT  NOTE            Date:   11/6/2020  Patient name:  Lakeisha Wesley  Date of admission:  11/4/2020  YOB: 1965      Chief Complaint:     Chief Complaint   Patient presents with    Altered Mental Status       Reason for Consult: Altered mental status    History of Present Illness: The patient is a 63 y.o. male who presents with Altered Mental Status  . The patient was seen and examined and the chart was reviewed. Patient presents to the hospital on 11/4 with altered mentation described as delirium. He has history of chronic indwelling Clemons and was found to have a suspected urinary tract infection. Patient has been on IV antibiotics since admission. Urinalysis showed large leukocyte esterase, 20-50 WBC, moderate bacteria, culture showing many types of bacteria. Received a dose of IV meropenem, currently on IV Cipro. Last night was noted to become more lethargic and was seen by primary team.  ABG was drawn showing a PCO2 of 70, PO2 of 68 and patient was placed on BiPAP. He wore for much of the night. There is no episodes of hypoxia noted. This morning he continues to be lethargic, not following commands. He has diffuse myoclonic type jerking movements. He has not had any fevers. White count is normal.  Hemoglobin 11.7. Ammonia is normal.  Blood cultures have been negative x2 days. Repeat urinalysis today shows positive nitrite, small leukocyte esterase, 10-20 WBC, moderate bacteria. Neurology consulted for altered mentation. At this time patient is lethargic, moans when stimulated. Not able to answer questions appropriately. He is not following commands. He has diffuse myoclonic jerks present. Home medication list includes Klonopin and Ativan which she has not been receiving since admission, unclear whether he takes this consistently at home.     Past Medical History:     Past Medical History:   Diagnosis Date  Anxiety and depression     Back pain, chronic     BPH (benign prostatic hyperplasia)     CHF (congestive heart failure) (HCC)     Cirrhosis ([facility]     Diabetes mellitus ([facility]     not checking bloodsugar at home    GERD (gastroesophageal reflux disease)     H/O cardiac catheterization not sure of date    no stents    Hepatitis     Pt does not know the type.      Hiatal hernia     History of alcohol abuse     Sober since 2013    Hyperlipidemia     not taking any medication    Hypertension     IBS (irritable bowel syndrome)     Morbid obesity ([facility]     Neuropathy     feet,toes    On home oxygen therapy     prn    Pancreatitis     x 5 episodes    Primary osteoarthritis of right knee     Sleep apnea     No machine    Thyroid disease     Urinary bladder neurogenic dysfunction         Past Surgical History:     Past Surgical History:   Procedure Laterality Date    ABDOMEN SURGERY      hernia repair x4 (ventral)    COLONOSCOPY      2012    CYSTOSCOPY  01/24/2018    CYSTOSCOPY  02/20/2019    CYSTOSCOPY N/A 2/20/2019    CYSTOSCOPY DILATION performed by Tray Hendrickson MD at [address] [facility] [address] N/A 8/23/2019    CYSTOSCOPY/ DILATION NICOLE CATHETER EXCHANGE performed by Tray Hendrickson MD at [facility], DIAGNOSTIC     [address]  05/20/2018    repair and reduction of recurrent incarcerated incisional hernia with mesh    ID CYSTOURETHROSCOPY N/A 1/24/2018    CYSTOSCOPY Clora Reza performed by Tray Hendrickson MD at [address] Bilateral 8/22/2017    FOOT DEBRIDEMENT INCISION AND DRAINAGE with bone bx performed by Mary Vera DPM at [address] EGD TRANSORAL BIOPSY SINGLE/MULTIPLE N/A 7/19/2017    EGD BIOPSY performed by Peter Honeycutt MD at [facility]  07/19/2017    polypectomy    UPPER GASTROINTESTINAL ENDOSCOPY N/A 3/14/2019    EGD BIOPSY performed by Roosevelt Dent MD at [facility] N/A 5/20/2018    REPAIR AND REDUCTION OF RECURRENT INCARCERATED INCISIONAL HERNIA WITH MESH performed by Preethi Bermudez MD at [facility]        Medications Prior to Admission:     Prior to Admission medications    Medication Sig Start Date End Date Taking? Authorizing Provider   insulin glargine (LANTUS) 100 UNIT/ML injection vial Inject 80 Units into the skin Daily with lunch   Yes Historical Provider, MD   naloxegol (MOVANTIK) 25 MG TABS tablet Take 25 mg by mouth every morning   Yes Historical Provider, MD   potassium chloride (KLOR-CON M) 20 MEQ TBCR extended release tablet Take 20 mEq by mouth daily (with breakfast)   Yes Historical Provider, MD   albuterol sulfate HFA (PROAIR HFA) 108 (90 Base) MCG/ACT inhaler Inhale 2 puffs into the lungs every 6 hours as needed for Wheezing   Yes Historical Provider, MD   hydrocortisone 2.5 % cream Apply topically as needed Apply topically 2 times daily. Yes Historical Provider, MD   oxyCODONE-acetaminophen (PERCOCET) 7.5-325 MG per tablet Take 1 tablet by mouth every 6 hours as needed for Pain.    Yes Historical Provider, MD   bethanechol (URECHOLINE) 10 MG tablet Take 10 mg by mouth 3 times daily   Yes Historical Provider, MD   tamsulosin (FLOMAX) 0.4 MG capsule Take 1 capsule by mouth daily 1/24/18  Yes Live Hardy MD   hydrOXYzine (ATARAX) 25 MG tablet Take 25 mg by mouth 3 times daily as needed for Itching   Yes Historical Provider, MD   oxybutynin (DITROPAN) 5 MG tablet Take 1 tablet by mouth 2 times daily 12/12/17  Yes Camacho Samayoa DO   bumetanide (BUMEX) 2 MG tablet Take 2 mg by mouth 2 times daily   Yes Historical Provider, MD   ibuprofen (ADVIL;MOTRIN) 600 MG tablet Take 600 mg by mouth every 6 hours as needed for Pain   Yes Historical Provider, MD   spironolactone (ALDACTONE) 25 MG tablet Take 50 mg by mouth 2 times daily Indications: pt takes 25mg and 50mg tab to equal 75mg BID    Yes Historical Provider, history of drug use. Family History:     Family History   Adopted: Yes       Review of Systems:     Unable to obtain secondary to lethargic state    Physical Exam:   BP (!) 167/60   Pulse 75   Temp 98.2 °F (36.8 °C) (Axillary)   Resp 20   Ht 5' 11\" (1.803 m)   Wt (!) 400 lb (181.4 kg)   SpO2 97%   BMI 55.79 kg/m²   Temp (24hrs), Av.8 °F (37.1 °C), Min:98.2 °F (36.8 °C), Max:99.3 °F (37.4 °C)        General examination:      General Appearance: Lethargic, moans when stimulated   HEENT: Normocephalic, atraumatic, dry mucus membranes  Neck: supple, no carotid bruits, (-) nuchal rigidity  Lungs:  Respirations unlabored, chest wall no deformity  Cardiovascular: normal rate, regular rhythm  Abdomen: Obese, soft, nontender, nondistended, normal bowel sounds  Skin: No gross lesions, rashes, bruising or bleeding on exposed skin area  Extremities: Bilateral Ace wraps present lower extremities      Neurological examination:    Mental status   Lethargic; moans when stimulated with sternal rub or supraorbital stimulation. No intelligible sounds are present. Not answering any questions appropriately. Does not follow commands.      Cranial nerves   Pupil response:            Present     Oculocephalic reflex:   Present    Corneal Reflex:            Present     Facial grimace to pin:  Present     Gag/Cough reflex:       Present        Motor and sensory function  Spontaneous limb movements present in all 4 extremities   diffuse myoclonic jerks present extremities spontaneously  (+) withdrawal to pin, deep nail bed pressure in all extremities        DTR 1/4 throughout  Plantar response: Downgoing   (-) Wan's sign bilaterally    Gait  Not tested              Diagnostics:      Laboratory Testing:  CBC:   Recent Labs     20  0607   WBC 10.0 9.3 9.7   HGB 12.3* 11.7* 11.2*    133 133*     BMP:    Recent Labs     20  06    140 140   K 3. 2* 3.5* 3.5*   CL 95* 97* 98   CO2 36* 34* 36*   BUN 12 10 10   CREATININE 0.70 0.74 0.63*   GLUCOSE 182* 164* 189*         Lab Results   Component Value Date    CHOL 203 (H) 07/18/2017    LDLCHOLESTEROL 122 07/18/2017    HDL 46 07/18/2017    TRIG 174 (H) 07/18/2017    ALT 15 11/04/2020    AST 20 11/04/2020    TSH 4.72 05/20/2018    INR 1.0 07/31/2019    LABA1C 7.4 (H) 05/20/2018       Imaging/Diagnostics:        CT head: No acute process     I personally reviewed all of the above medications, clinical laboratory, imaging and other diagnostic tests. Impression:      1. Acute metabolic encephalopathy; underlying UTI, hypercapnia. Rule out other metabolic/infectious causes. 2. ? Benzodiazepine withdrawal, home med list showing Klonopin and Ativan, has not received while here. 3. Diffuse myoclonic jerking likely secondary to metabolic etiology, will rule out seizure. Plan:      Recheck ABG, check chest x-ray   Repeat urine culture is pending, check procalcitonin, CRP, lactic acid   Repeat CT head, patient will not fit in MRI machine   EEG   ID following    If above work up negative may consider pursuing LP    Will follow        Thank you for this very interesting consultation.       Electronically signed by Catrachita Rocha DO on 11/6/2020 at 12:46 PM      Catrachita Rocha [address] [place] [address]  Neurology

## 2020-11-06 NOTE — CARE COORDINATION
Discharge planning    Chart reviewed. Appreciate SS notes and anticipate snf placement. No therapy has been ordered as of yet and RN states still slightly obtunded . Perfect serve to attending to see if can order therapy so they can assess once appropriate. KAROLYN in epic and will defer to ss.

## 2020-11-06 NOTE — PROGRESS NOTES
[facility]  Progress Note    Levon Mendez  MRN: [id number]  Date: 11/6/2020  LOS: 2     Reason for F/U :   UTI    Impression :   1. Toxic metabolic encephalopathy  2. Urinary retention with indwelling Clemons catheter  3. Concern for catheter associated urinary tract infection  4. Diabetes mellitus type 2  5. Morbid obesity  6. Hypercapnic respiratory failure    Recommendations:   · The patient is currently on intravenous antimicrobial therapy with ciprofloxacin. · The care was discussed with the neurology service and is not clear to me that the patient has an acute infectious process. · While the urine is abnormal is not uncommon for people with indwelling Clemons catheters to have abnormal urinalysis and the urine culture has mixed organisms which is to be expected. · The patient will undergo CT imaging of the head, as well as EEG. · A follow-up ABG has been ordered, and the patient's mentation did not improve with BiPAP overnight. Infection Control Recommendations:   Universal precautions    Discharge Planning:   Estimated Length of IV antimicrobials: To be determined  Patient will need Midline Catheter Insertion/ PICC line Insertion: No  Patient will need: Home IV , [place],  SNF,  LTAC: Undetermined  Patient willneed outpatient wound care: No    Medical Decision making / Summary of Stay:   Levon Mendez is a 63y.o.-year-old male who was initially admitted on 11/4/2020. Patient has known medical history of morbid obesity, pancreatitis, urinary retention status post indwelling Clemons catheter placement, CHF, diabetes mellitus, hypertension, morbid obesity. Patient did have a urine culture at [facility] at August 2019 that was positive for Enterococcus, E. coli, Pseudomonas. He did have a urine culture at [facility] in January 2020 + for E. coli and Enterococcus.     I am not entirely clear on patient's baseline but he currently is lying in bed and unable to give me any history. Family is not at bedside.     It is my understanding that patient lives at home with family and caregivers and he is not very active, pretty much just sits in the chair, goes to bed and goes to the restroom. According to the chart, patient has been confused for about 1 week with associated hallucinations. The confusion increased yesterday and usually when he gets like this family states he has a urinary tract infection so they brought patient to the emergency department. Patient has been started on meropenem. We were consulted to assist with antimicrobial therapy choice    Current evaluation:2020    BP (!) 167/60   Pulse 75   Temp 98.2 °F (36.8 °C) (Axillary)   Resp 20   Ht 5' 11\" (1.803 m)   Wt (!) 400 lb (181.4 kg)   SpO2 97%   BMI 55.79 kg/m²     Temperature Range: Temp: 98.2 °F (36.8 °C) Temp  Av.8 °F (37.1 °C)  Min: 98.2 °F (36.8 °C)  Max: 99.3 °F (37.4 °C)  The patient is seen and evaluated at bedside he is encephalopathic/confused was not able to arouse him he does moan a little bit with some vigorous stimulation. He did receive BiPAP overnight for a PCO2 of greater than 70. His mentation has been so poor that he has not been able to get any medications for the past 2 days. Review of Systems   Unable to perform ROS: Mental status change       Physical Examination :     Physical Exam  Constitutional:       Appearance: He is well-developed. HENT:      Head: Normocephalic and atraumatic. Mouth/Throat:      Mouth: Mucous membranes are dry. Neck:      Musculoskeletal: Neck supple. Cardiovascular:      Rate and Rhythm: Normal rate. Heart sounds: Murmur present. No friction rub. No gallop. Pulmonary:      Effort: Pulmonary effort is normal.      Breath sounds: Normal breath sounds. No wheezing. Abdominal:      General: Bowel sounds are normal.      Palpations: Abdomen is soft. There is no mass. Tenderness: There is no abdominal tenderness.    Lymphadenopathy: Cervical: No cervical adenopathy. Skin:     General: Skin is warm and dry. Comments: Left posterior calf superficial ulceration   Neurological:      Mental Status: He is disoriented. Laboratory data:   I have independently reviewed the followinglabs:  CBC with Differential:   Recent Labs     11/04/20  1239  11/05/20  2317 11/06/20  0607   WBC 6.8   < > 9.3 9.7   HGB 12.4*   < > 11.7* 11.2*   HCT 40.2*   < > 40.6* 38.2*   *   < > 133 133*   LYMPHOPCT 12*  --  13*  --    MONOPCT 7  --  10*  --     < > = values in this interval not displayed. BMP:   Recent Labs     11/05/20  0626 11/05/20 2317 11/06/20  0607    140 140   K 3.2* 3.5* 3.5*   CL 95* 97* 98   CO2 36* 34* 36*   BUN 12 10 10   CREATININE 0.70 0.74 0.63*   MG 1.6  --  1.6     Hepatic Function Panel:   Recent Labs     11/04/20  1239   PROT 7.7   LABALBU 3.3*   BILIDIR 0.22   IBILI 0.55   BILITOT 0.77   ALKPHOS 197*   ALT 15   AST 20         No results found for: PROCAL  Lab Results   Component Value Date    CRP 74.3 08/20/2017    CRP 66.1 05/08/2016    .7 05/04/2016     Lab Results   Component Value Date    SEDRATE 83 (H) 08/20/2017         Lab Results   Component Value Date    DDIMER 2.49 05/04/2016     Lab Results   Component Value Date    FERRITIN 109 05/07/2016     No results found for: LDH  No results found for: FIBRINOGEN    Results in Past 30 Days  Result Component Current Result Ref Range Previous Result Ref Range   SARS-CoV-2      (11/5/2020)  Not in Time Range          (11/5/2020)       Not Detected (11/5/2020) Not Detected       Lab Results   Component Value Date    COVID19 Not Detected 11/05/2020       No results for input(s): VANCOTROUGH in the last 72 hours. Imaging Studies:   No new imaging    Cultures:     Culture, Urine [[id number]]  (Abnormal)  Collected: 11/04/20 1146    Order Status: Completed  Specimen: Urine, clean catch  Updated: 11/06/20 0807     Specimen Description  . CLEAN CATCH URINE

## 2020-11-06 NOTE — PROGRESS NOTES
Am assessment and vitals complete, pt remains unresponsive and does not follow commands, vss, pt taken off bipap and placed on 2L/nc, pt did wear bipap all night, pulse ox 95% on 2L/nc, will continue to monitor

## 2020-11-06 NOTE — PLAN OF CARE
Problem: Skin Integrity:  Goal: Will show no infection signs and symptoms  Description: Will show no infection signs and symptoms  11/6/2020 0319 by Harini Larson RN  Outcome: Ongoing  11/5/2020 1423 by Jacobo Gerber RN  Outcome: Ongoing  Goal: Absence of new skin breakdown  Description: Absence of new skin breakdown  Outcome: Ongoing     Problem: Urinary Elimination:  Goal: Signs and symptoms of infection will decrease  Description: Signs and symptoms of infection will decrease  Outcome: Ongoing  Goal: Ability to reestablish a normal urinary elimination pattern will improve - after catheter removal  Description: Ability to reestablish a normal urinary elimination pattern will improve  Outcome: Ongoing  Goal: Complications related to the disease process, condition or treatment will be avoided or minimized  Description: Complications related to the disease process, condition or treatment will be avoided or minimized  Outcome: Ongoing

## 2020-11-06 NOTE — CARE COORDINATION
Social Work-Spoke with Carlyn Aguilar regarding preference of SNF. Her first choice is [address]) [facility]) [place]. Sent referral to all 3 SNF.  Noelle Cottrell

## 2020-11-06 NOTE — PROGRESS NOTES
Comprehensive Nutrition Assessment    Type and Reason for Visit:  Consult, Positive Nutrition Screen(Pressure ulcer or non-healing wound. Dietitian consult needed: poor intake. Consult: nutritional assessment for sinai score)    Nutrition Recommendations/Plan:   1. Continue CARB CONTROL; Low Sodium (2 GM) diet; Daily Fluid Restriction: 1800 mL  2. Start Ensure High Protein   3. Monitor p.o intakes and labs. If patient continues to have poor oral intake consider nutrition support (TF or TPN)    Nutrition Assessment:  Patient admission is related to UTI and altered mental status. Patient's ex-wife reports patient has been sleeping for 3 days and is not waking up to eat. Patient has venous wound present on lower left extremity. Will start Ensure High Protein in hopes that patient may drink a few sips when he is more alert. If patient continues to have altered mental status over the next few days consider nutrition support (TF or TPN). Monitor p.o intakes, labs and need for nutrition intervention. Malnutrition Assessment:  Malnutrition Status: At risk for malnutrition (Comment)        Estimated Daily Nutrient Needs:  Energy (kcal):  7219-2363 kcal based on 10-11 kcal/kg; Weight Used for Energy Requirements:  Current     Protein (g):  140-156 gm based on 1.8-2 gm/kg; Weight Used for Protein Requirements:  Ideal            Nutrition Related Findings:  +2 BUE, +1 BLE; lethargic      Wounds:  Venous Stasis, Stage III(LLE wound, nose wound)       Current Nutrition Therapies:    DIET CARB CONTROL; Low Sodium (2 GM);  Daily Fluid Restriction: 1800 ml  Dietary Nutrition Supplements: Low Calorie High Protein Supplement    Anthropometric Measures:  · Height: 5' 11\" (180.3 cm)  · Current Body Weight: 400 lb (181.4 kg)   · Admission Body Weight: 400 lb (181.4 kg)    · Ideal Body Weight: 172 lbs; % Ideal Body Weight 232.6 %   · BMI: 55.8  · BMI Categories: Obese Class 3 (BMI 40.0 or greater)       Nutrition Diagnosis: · Inadequate protein-energy intake related to inadequate protein-energy intake(altered mental status) as evidenced by intake 0-25%(for many days)      Nutrition Interventions:   Food and/or Nutrient Delivery:  Continue Current Diet, Start Oral Nutrition Supplement  Nutrition Education/Counseling:  Education not indicated   Coordination of Nutrition Care:  Continue to monitor while inpatient    Goals:  Nutritional intake is greater than 75% of estimated nutrition needs       Nutrition Monitoring and Evaluation:   Food/Nutrient Intake Outcomes:  Food and Nutrient Intake, Supplement Intake  Physical Signs/Symptoms Outcomes:  Biochemical Data, Fluid Status or Edema, Skin, Weight     Discharge Planning:    Continue current diet             Eric Schaefer  MFN, RDN, LDN  Lead Clinical Dietitian  RD Office Phone [phone]

## 2020-11-06 NOTE — PLAN OF CARE
Problem: Falls - Risk of:  Goal: Will remain free from falls  Description: Will remain free from falls  Outcome: Ongoing     Pt remains confused/unresponsive, vss, new neuro consult today, testing in progress, frequent rounds made

## 2020-11-06 NOTE — PROGRESS NOTES
Physical Therapy  DATE: 2020    NAME: Richa Fairchild  MRN: [id number]   : 1965    Patient not seen this date for Physical Therapy due to:  [] Blood transfusion in progress  [x] Cancel by RN: not medically appropriate yet, only minimally responsive  [] Hemodialysis  []  Refusal by Patient   [] Spine Precautions   [] Strict Bedrest  [] Surgery  [] Testing      [] Other        [] PT being discontinued at this time. Patient independent. No further needs. [] PT being discontinued at this time as the patient has been transferred to hospice care. No further needs.     Latrell Lopez, PT

## 2020-11-06 NOTE — PLAN OF CARE
Nutrition Problem #1: Inadequate protein-energy intake  Intervention: Food and/or Nutrient Delivery: Continue Current Diet, Start Oral Nutrition Supplement  Nutritional Goals: Nutritional intake is greater than 75% of estimated nutrition needs

## 2020-11-06 NOTE — FLOWSHEET NOTE
Patient is reclining and patient's ex-spouse/caregiver is bedside. Patient is unresponsive but ex-spouse/caregiver engages in conversation. Ex-spouse/caregiver denies and spiritual or emotional concerns but expresses appreciation for the visit. Patient does not respond. Spiritual Care will follow as needed. 11/06/20 1130   Encounter Summary   Services provided to: Patient and family together   Referral/Consult From: [address] [place] [address]; Children   Continue Visiting   (11/6/20)   Complexity of Encounter Low   Length of Encounter 15 minutes   Routine   Type Initial   Assessment Approachable;Passive   Intervention Active listening;Explored feelings, thoughts, concerns;Explored coping resources;Nurtured hope   Outcome Acceptance;Expressed gratitude

## 2020-11-06 NOTE — PLAN OF CARE
[facility]  Office: [address], DO, Hebert Schulz, DO, Jose L Signs, DO, Mary Ulrich Blood, DO, Aida Angelo MD, Mignon Meckel, MD, Vianey Hodges MD, Yanna Cobb MD, Raquel Dixon MD, Yue Acosta MD, Damari Farrell MD, Malathi Ellis MD, Emily Mabry MD, Gaviota Washington, DO, Janine Sherman MD, Reji Woodward MD, Frida Reyes, DO, Toribio Davis MD,  Angus Murrieta DO, Mihai Bryan MD, Omid Valladares MD, Morgan Cheng, [facility], [facility], CNP, Marla Dover, CNP, Harshal Malagon, CNS, Malcolm Briseno, CNP, Jennifer Reyes, CNP, Melecio Ochoa, CNP, Addy Kenney, CNP, Alisa Adame, CNP, Rin Aguilera PA-C, Shazia Rodriguez, NICKOLAS, Tabatha Colbert, CNP, Eleanor Groves, CNP, Rodríguez Burkett, CNP, Ying Flores, CNP, Caryle Poet, [facility] [facility] [address]   Nighttime In-House Nurse Practitioner      11/5/2020    10:45 PM    Name:   Lakesha Choi  MRN:     [id number]     [place]:      [de-identified]   Room:   [address] [place] [address] Day:  1  Admit Date:  11/4/2020 11:46 AM    PCP:   Leni Connolly MD  Code Status:  Full Code    Called to the floor by remote NP on call to evaluate Lakesha Choi in [address] [place] [address] at 10:45 PM with complaint of lethargy. Assessment/Plan:     Asked to evaluate patient by remote NP on call. RN reports that the patient is lethargic, he will open his eyes briefly to tactile and verbal stimulation. He \"grumbles\" but does not interact verbally. Chart review does reflect altered mental status/metabolic encephalopathy. Admitting diagnosis includes UTI, he did have a fall prior to admission. He does not take blood thinners at home. He also has chronic respiratory failure. RN reports he had several occurences of oxygen desaturation into the 80's while on 3L supplemental oxygen via nasal cannula. He does respond to tactile stimulation and opens his eyes briefly. He does not answer my questions, but he does squeeze hands and move feet to my command.  Movement in hands and feet are symmetrical. CT head this admission was negative for acute intracranial process.     /60 (82)  P 85 T 98.7  sp02 95% 3L NC    STAT ABG, CBC, BMP, ammonia. Place patient on bipap. ABG: Ph 7.35, co2 70, po2 68, bicarb 39.1, BE +14. ABG reflects respiratory acidosis, chronic. Utilize bipap, neuro checks q4h. Plan of care discussed with Lin RN and Kimber Mattson RT. Physical Examination:        General appearance:  Lethargic. Mental Status:  Unable to fully assess, follows simple commands but does not interact verbally. Lungs:  diminished to auscultation bilaterally, normal effort  Heart:  regular rate and rhythm, no murmur  Abdomen:  Obese, soft, nontender, nondistended, normal bowel sounds, no masses, hepatomegaly, splenomegaly  Extremities:  no edema, redness, tenderness in the calves  Skin:  no gross lesions, rashes, induration, bandage to left lower extremity. Problem List:        Hospital Problems           Last Modified POA    * (Principal) Urinary tract infection associated with indwelling urethral catheter ([address] 36/1/9157 Yes    Alcoholic cirrhosis of liver ([address] 11/4/2020 Yes    Bipolar disorder ([address] 11/4/2020 Yes    Type 2 diabetes mellitus with diabetic neuropathy, with long-term current use of insulin ([address] 11/4/2020 Yes    Hyperlipidemia 53/8/8699 Yes    Metabolic encephalopathy 24/8/6043 Yes    FABI (obstructive sleep apnea) 11/4/2020 Yes    Hypothyroidism 11/4/2020 Yes    Urine retention 11/4/2020 Yes    Chronic indwelling Clemons catheter (Chronic) 11/4/2020 Yes    Back pain, chronic 11/4/2020 Yes    Chronic diastolic congestive heart failure ([address] 11/4/2020 Yes    GERD (gastroesophageal reflux disease) 11/4/2020 Yes    Morbid obesity ([address] 11/4/2020 Yes    Delirium due to another medical condition 11/4/2020 Yes    Musculoskeletal immobility 11/4/2020 Yes          Medications: Allergies:     Allergies   Allergen Reactions    No Known Allergies Current Meds:   Scheduled Meds:    ciprofloxacin  400 mg Intravenous Q12H    sodium chloride flush  10 mL Intravenous 2 times per day    enoxaparin  40 mg Subcutaneous BID    aspirin  81 mg Oral Daily    tamsulosin  0.4 mg Oral Daily    miconazole   Topical BID    pantoprazole  40 mg Oral QAM AC    venlafaxine  150 mg Oral Daily    bumetanide  2 mg Oral BID    bethanechol  10 mg Oral TID    DULoxetine  60 mg Oral QAM    levothyroxine  150 mcg Oral Daily    metFORMIN  500 mg Oral BID    nadolol  40 mg Oral Daily    pregabalin  150 mg Oral BID    alogliptin  25 mg Oral Daily    rifaximin  550 mg Oral BID    oxybutynin  5 mg Oral BID    naloxegol  25 mg Oral QAM    spironolactone  75 mg Oral BID    potassium chloride  20 mEq Oral Daily with breakfast    insulin glargine  80 Units Subcutaneous BID    insulin lispro  0-6 Units Subcutaneous TID WC    insulin lispro  0-3 Units Subcutaneous Nightly     Continuous Infusions:    sodium chloride 75 mL/hr at 11/05/20 2205    dextrose       PRN Meds: metoprolol, potassium chloride, LORazepam, sodium chloride flush, acetaminophen **OR** acetaminophen, polyethylene glycol, promethazine **OR** ondansetron, nicotine, albuterol sulfate HFA, clonazePAM, oxyCODONE-acetaminophen, hydrOXYzine, ibuprofen, glucose, dextrose, glucagon (rDNA), dextrose, albuterol    Data:     Past Medical History:   has a past medical history of Anxiety and depression, Back pain, chronic, BPH (benign prostatic hyperplasia), CHF (congestive heart failure) ([facility] Cirrhosis ([facility] Diabetes mellitus ([facility] GERD (gastroesophageal reflux disease), H/O cardiac catheterization, Hepatitis, Hiatal hernia, History of alcohol abuse, Hyperlipidemia, Hypertension, IBS (irritable bowel syndrome), Morbid obesity ([facility] Neuropathy, On home oxygen therapy, Pancreatitis, Primary osteoarthritis of right knee, Sleep apnea, Thyroid disease, and Urinary bladder neurogenic dysfunction.     Vitals:  BP (!) 152/60   Pulse 86   Temp 99.1 °F (37.3 °C) (Oral)   Resp 18   Ht 5' 11\" (1.803 m)   Wt (!) 400 lb (181.4 kg)   SpO2 97%   BMI 55.79 kg/m²   Temp (24hrs), Av.5 °F (36.9 °C), Min:97.1 °F (36.2 °C), Max:99.3 °F (37.4 °C)    Recent Labs     20  0852 20  1236 20  1701   POCGLU 139* 172* 148* 195*       I/O (24Hr):     Intake/Output Summary (Last 24 hours) at 2020 2245  Last data filed at 2020 1749  Gross per 24 hour   Intake 1386 ml   Output 1355 ml   Net 31 ml       Labs:    Hematology:  Recent Labs     20  1239 20  0626   WBC 6.8 10.0   RBC 4.83 4.88   HGB 12.4* 12.3*   HCT 40.2* 41.3   MCV 83.2 84.6   MCH 25.7 25.2   MCHC 30.8 29.8   RDW 14.6* 15.0*   * 163   MPV 10.4 10.9   SEGS 78*  --    LYMPHOPCT 12*  --    MONOPCT 7  --    EOSRELPCT 1  --    BASOPCT 1  --      Chemistry:  Recent Labs     20  1239 20  0626    141   K 3.3* 3.2*   CL 92* 95*   CO2 37* 36*   GLUCOSE 165* 182*   BUN 13 12   CREATININE 0.71 0.70   MG  --  1.6   ANIONGAP 9 10   LABGLOM >60 >60   GFRAA >60 >60   CALCIUM 9.2 9.3   TROPHS 11  --    CKTOTAL 45  --    MYOGLOBIN 64  --    LACTA 1.9  --      Recent Labs     20  1239   PROT 7.7   LABALBU 3.3*   AST 20   ALT 15   ALKPHOS 197*   BILITOT 0.77   BILIDIR 0.22   AMMONIA 47     Glucose:  Recent Labs     20  0852 20  1236 20  1701   POCGLU 139* 172* 148* 195*         Lab Results   Component Value Date/Time    SPECIAL 10ML LH 2020 12:35 PM     Lab Results   Component Value Date/Time    CULTURE NO GROWTH 1 DAY 2020 12:35 PM       Lab Results   Component Value Date    POCPH 7.40 2020    PHART 7.330 2014    POCPCO2 66 2020    PHS5DRH 68.0 2014    POCPO2 88 2020    PO2ART 84.0 2014    POCHCO3 41.2 2020    RSJ9IJK 34.9 2014    NBEA NOT REPORTED 2020    PBEA 16 2020    VUA6KHI 43 2020    YSDE6CSQ 96 11/04/2020    A1SFNDFD 96.5 06/18/2014    FIO2 2.0 11/04/2020       Radiology:    Ct Head Wo Contrast    Result Date: 11/4/2020  No acute intracranial abnormality. Xr Chest Portable    Result Date: 11/4/2020  Small right lung base opacity is nonspecific but likely atelectasis and a pleural effusion. Pneumonia is not excluded but felt to be less likely.        Molina Pena, APRN - CNP  11/5/2020  10:45 PM

## 2020-11-07 PROBLEM — N30.80 PYOCYSTIS: Status: ACTIVE | Noted: 2020-11-07

## 2020-11-07 LAB
ANION GAP SERPL CALCULATED.3IONS-SCNC: 9 MMOL/L (ref 9–17)
BUN BLDV-MCNC: 10 MG/DL (ref 6–20)
BUN/CREAT BLD: 15 (ref 9–20)
CALCIUM SERPL-MCNC: 8.9 MG/DL (ref 8.6–10.4)
CHLORIDE BLD-SCNC: 100 MMOL/L (ref 98–107)
CO2: 31 MMOL/L (ref 20–31)
CREAT SERPL-MCNC: 0.67 MG/DL (ref 0.7–1.2)
GFR AFRICAN AMERICAN: >60 ML/MIN
GFR NON-AFRICAN AMERICAN: >60 ML/MIN
GFR SERPL CREATININE-BSD FRML MDRD: ABNORMAL ML/MIN/{1.73_M2}
GFR SERPL CREATININE-BSD FRML MDRD: ABNORMAL ML/MIN/{1.73_M2}
GLUCOSE BLD-MCNC: 169 MG/DL (ref 75–110)
GLUCOSE BLD-MCNC: 189 MG/DL (ref 75–110)
GLUCOSE BLD-MCNC: 191 MG/DL (ref 75–110)
GLUCOSE BLD-MCNC: 213 MG/DL (ref 75–110)
GLUCOSE BLD-MCNC: 233 MG/DL (ref 70–99)
HCT VFR BLD CALC: 39.5 % (ref 40.7–50.3)
HEMOGLOBIN: 11.7 G/DL (ref 13–17)
MAGNESIUM: 1.7 MG/DL (ref 1.6–2.6)
MCH RBC QN AUTO: 25.5 PG (ref 25.2–33.5)
MCHC RBC AUTO-ENTMCNC: 29.6 G/DL (ref 28.4–34.8)
MCV RBC AUTO: 86.2 FL (ref 82.6–102.9)
NRBC AUTOMATED: 0 PER 100 WBC
PDW BLD-RTO: 14.9 % (ref 11.8–14.4)
PLATELET # BLD: 104 K/UL (ref 138–453)
PMV BLD AUTO: 10 FL (ref 8.1–13.5)
POTASSIUM SERPL-SCNC: 3.3 MMOL/L (ref 3.7–5.3)
POTASSIUM SERPL-SCNC: 3.5 MMOL/L (ref 3.7–5.3)
RBC # BLD: 4.58 M/UL (ref 4.21–5.77)
SODIUM BLD-SCNC: 140 MMOL/L (ref 135–144)
WBC # BLD: 9.3 K/UL (ref 3.5–11.3)

## 2020-11-07 PROCEDURE — 99233 SBSQ HOSP IP/OBS HIGH 50: CPT | Performed by: PSYCHIATRY & NEUROLOGY

## 2020-11-07 PROCEDURE — 99232 SBSQ HOSP IP/OBS MODERATE 35: CPT | Performed by: INTERNAL MEDICINE

## 2020-11-07 PROCEDURE — 83735 ASSAY OF MAGNESIUM: CPT

## 2020-11-07 PROCEDURE — 97163 PT EVAL HIGH COMPLEX 45 MIN: CPT

## 2020-11-07 PROCEDURE — 2700000000 HC OXYGEN THERAPY PER DAY

## 2020-11-07 PROCEDURE — 95816 EEG AWAKE AND DROWSY: CPT | Performed by: PSYCHIATRY & NEUROLOGY

## 2020-11-07 PROCEDURE — 94761 N-INVAS EAR/PLS OXIMETRY MLT: CPT

## 2020-11-07 PROCEDURE — 6360000002 HC RX W HCPCS: Performed by: NURSE PRACTITIONER

## 2020-11-07 PROCEDURE — 2580000003 HC RX 258: Performed by: UROLOGY

## 2020-11-07 PROCEDURE — 94660 CPAP INITIATION&MGMT: CPT

## 2020-11-07 PROCEDURE — 82947 ASSAY GLUCOSE BLOOD QUANT: CPT

## 2020-11-07 PROCEDURE — 51702 INSERT TEMP BLADDER CATH: CPT

## 2020-11-07 PROCEDURE — 95816 EEG AWAKE AND DROWSY: CPT

## 2020-11-07 PROCEDURE — 36415 COLL VENOUS BLD VENIPUNCTURE: CPT

## 2020-11-07 PROCEDURE — 2500000003 HC RX 250 WO HCPCS: Performed by: UROLOGY

## 2020-11-07 PROCEDURE — 80048 BASIC METABOLIC PNL TOTAL CA: CPT

## 2020-11-07 PROCEDURE — 6370000000 HC RX 637 (ALT 250 FOR IP): Performed by: NURSE PRACTITIONER

## 2020-11-07 PROCEDURE — 84132 ASSAY OF SERUM POTASSIUM: CPT

## 2020-11-07 PROCEDURE — 97110 THERAPEUTIC EXERCISES: CPT

## 2020-11-07 PROCEDURE — 2060000000 HC ICU INTERMEDIATE R&B

## 2020-11-07 PROCEDURE — 85027 COMPLETE CBC AUTOMATED: CPT

## 2020-11-07 PROCEDURE — 6370000000 HC RX 637 (ALT 250 FOR IP): Performed by: PSYCHIATRY & NEUROLOGY

## 2020-11-07 RX ORDER — CLONAZEPAM 0.5 MG/1
1 TABLET ORAL 2 TIMES DAILY
Status: DISCONTINUED | OUTPATIENT
Start: 2020-11-07 | End: 2020-11-10 | Stop reason: HOSPADM

## 2020-11-07 RX ORDER — NEOMYCIN AND POLYMYXIN B SULFATES 40; 200000 MG/ML; [USP'U]/ML
SOLUTION IRRIGATION ONCE
Status: DISCONTINUED | OUTPATIENT
Start: 2020-11-07 | End: 2020-11-07 | Stop reason: SDUPTHER

## 2020-11-07 RX ADMIN — POTASSIUM CHLORIDE 20 MEQ: 20 TABLET, EXTENDED RELEASE ORAL at 10:13

## 2020-11-07 RX ADMIN — VENLAFAXINE HYDROCHLORIDE 150 MG: 75 CAPSULE, EXTENDED RELEASE ORAL at 10:13

## 2020-11-07 RX ADMIN — PANTOPRAZOLE SODIUM 40 MG: 40 TABLET, DELAYED RELEASE ORAL at 10:14

## 2020-11-07 RX ADMIN — ANTI-FUNGAL POWDER MICONAZOLE NITRATE TALC FREE: 1.42 POWDER TOPICAL at 21:04

## 2020-11-07 RX ADMIN — POTASSIUM CHLORIDE 10 MEQ: 10 INJECTION, SOLUTION INTRAVENOUS at 22:04

## 2020-11-07 RX ADMIN — OXYBUTYNIN CHLORIDE 5 MG: 5 TABLET ORAL at 10:14

## 2020-11-07 RX ADMIN — BUMETANIDE 2 MG: 1 TABLET ORAL at 10:13

## 2020-11-07 RX ADMIN — INSULIN LISPRO 1 UNITS: 100 INJECTION, SOLUTION INTRAVENOUS; SUBCUTANEOUS at 13:13

## 2020-11-07 RX ADMIN — METFORMIN HYDROCHLORIDE 500 MG: 500 TABLET ORAL at 10:13

## 2020-11-07 RX ADMIN — NEOMYCIN AND POLYMYXIN B SULFATES: 40; 200000 SOLUTION IRRIGATION at 06:50

## 2020-11-07 RX ADMIN — RIFAXIMIN 550 MG: 550 TABLET ORAL at 10:19

## 2020-11-07 RX ADMIN — ALOGLIPTIN 25 MG: 25 TABLET, FILM COATED ORAL at 10:13

## 2020-11-07 RX ADMIN — RIFAXIMIN 550 MG: 550 TABLET ORAL at 21:03

## 2020-11-07 RX ADMIN — POTASSIUM CHLORIDE 10 MEQ: 10 INJECTION, SOLUTION INTRAVENOUS at 21:03

## 2020-11-07 RX ADMIN — SPIRONOLACTONE 75 MG: 25 TABLET ORAL at 10:14

## 2020-11-07 RX ADMIN — CLONAZEPAM 1 MG: 0.5 TABLET ORAL at 17:29

## 2020-11-07 RX ADMIN — INSULIN LISPRO 2 UNITS: 100 INJECTION, SOLUTION INTRAVENOUS; SUBCUTANEOUS at 10:20

## 2020-11-07 RX ADMIN — OXYBUTYNIN CHLORIDE 5 MG: 5 TABLET ORAL at 21:03

## 2020-11-07 RX ADMIN — CIPROFLOXACIN 400 MG: 2 INJECTION, SOLUTION INTRAVENOUS at 04:04

## 2020-11-07 RX ADMIN — SPIRONOLACTONE 75 MG: 25 TABLET ORAL at 21:03

## 2020-11-07 RX ADMIN — PREGABALIN 150 MG: 75 CAPSULE ORAL at 10:13

## 2020-11-07 RX ADMIN — INSULIN LISPRO 1 UNITS: 100 INJECTION, SOLUTION INTRAVENOUS; SUBCUTANEOUS at 17:29

## 2020-11-07 RX ADMIN — PREGABALIN 150 MG: 75 CAPSULE ORAL at 21:03

## 2020-11-07 RX ADMIN — ENOXAPARIN SODIUM 40 MG: 40 INJECTION SUBCUTANEOUS at 10:20

## 2020-11-07 RX ADMIN — INSULIN LISPRO 1 UNITS: 100 INJECTION, SOLUTION INTRAVENOUS; SUBCUTANEOUS at 21:14

## 2020-11-07 RX ADMIN — TAMSULOSIN HYDROCHLORIDE 0.4 MG: 0.4 CAPSULE ORAL at 10:13

## 2020-11-07 RX ADMIN — ENOXAPARIN SODIUM 40 MG: 40 INJECTION SUBCUTANEOUS at 21:03

## 2020-11-07 RX ADMIN — POTASSIUM CHLORIDE 10 MEQ: 10 INJECTION, SOLUTION INTRAVENOUS at 17:56

## 2020-11-07 RX ADMIN — POTASSIUM CHLORIDE 10 MEQ: 10 INJECTION, SOLUTION INTRAVENOUS at 19:19

## 2020-11-07 RX ADMIN — DULOXETINE HYDROCHLORIDE 60 MG: 60 CAPSULE, DELAYED RELEASE ORAL at 10:13

## 2020-11-07 RX ADMIN — ASPIRIN 81 MG: 81 TABLET, COATED ORAL at 10:13

## 2020-11-07 RX ADMIN — METFORMIN HYDROCHLORIDE 500 MG: 500 TABLET ORAL at 17:29

## 2020-11-07 RX ADMIN — ANTI-FUNGAL POWDER MICONAZOLE NITRATE TALC FREE: 1.42 POWDER TOPICAL at 10:16

## 2020-11-07 RX ADMIN — LEVOTHYROXINE SODIUM 150 MCG: 0.15 TABLET ORAL at 10:14

## 2020-11-07 RX ADMIN — NADOLOL 40 MG: 20 TABLET ORAL at 10:14

## 2020-11-07 RX ADMIN — BUMETANIDE 2 MG: 1 TABLET ORAL at 17:28

## 2020-11-07 ASSESSMENT — PAIN SCALES - WONG BAKER
WONGBAKER_NUMERICALRESPONSE: 0

## 2020-11-07 ASSESSMENT — PAIN SCALES - GENERAL
PAINLEVEL_OUTOF10: 0

## 2020-11-07 NOTE — PROCEDURES
EEG REPORT    Patient Name: Karlos Shelby  Patient MRN: [id number]    Referring Physician: Cordie Apley, DO  Dictating Physician: Cordie Apley, DO  Date: 11/7/2020      CLINICAL HISTORY: This EEG was performed on a 63 y.o. male altered mental status, myoclonic jerking. It is being performed to evaluate for seizure activity. CURRENT ANTI-EPILEPTIC MEDICATIONS: None    DESCRIPTION: Patient was noted to be lethargic during the recording. Wakefulness, and drowsiness, were obtained. During wakefulness there was a posterior background of  Fairly well modulated, reactive, symmetrical, low to moderate voltage, 6-7 Hz activity. During drowsiness there were symmetrical vertex sharp waves and attenuation of the waking background. There were frequent generalized, frontally dominant 1-3 Hz slow waves throughout the recording. Photic stimulation did not evoke a posterior driving response. Hyperventilation was not performed due to patients clinical state. EEG DIAGNOSIS: This EEG was abnormal due to the presence of:  1. Frequent generalized, frontally dominant 1-3 Hz slow waves  2. Moderate background slowing    CLINICAL INTERPRETATION: The findings in this EEG are indicative of a moderate encephalopathy, of nonspecific etiology. No epileptiform activity was present.       Cordie Apley, [address] [place] [address]  Neurology

## 2020-11-07 NOTE — PROGRESS NOTES
[facility]  Progress Note    Yoav Villalobos  MRN: [id number]  Date: 11/7/2020  LOS: 3     Reason for F/U :   UTI    Impression :   1. Toxic metabolic encephalopathy-improved  2. Urinary retention with indwelling Clemons catheter  3. Concern for catheter associated urinary tract infection  4. Diabetes mellitus type 2  5. Morbid obesity  6. Hypercapnic respiratory failure    Recommendations:   · The patient's encephalopathy has improved. · Again I doubt that he truly has a urinary tract infection at this point in time I would not stop the antimicrobial therapy. · I suspect that the hypercapnia was likely playing a significant role as his PCO2 yesterday/last evening was markedly improved. · He will need to continue BiPAP therapy. · The other culture data so far remains negative   · CT imaging of the brain remains negative     Infection Control Recommendations:   Universal precautions    Discharge Planning:   Estimated Length of IV antimicrobials: To be determined  Patient will need Midline Catheter Insertion/ PICC line Insertion: No  Patient will need: Home IV , [place],  SNF,  LTAC: Undetermined  Patient willneed outpatient wound care: No    Medical Decision making / Summary of Stay:   Yoav Villalobos is a 63y.o.-year-old male who was initially admitted on 11/4/2020. Patient has known medical history of morbid obesity, pancreatitis, urinary retention status post indwelling Clemons catheter placement, CHF, diabetes mellitus, hypertension, morbid obesity. Patient did have a urine culture at [facility] at August 2019 that was positive for Enterococcus, E. coli, Pseudomonas. He did have a urine culture at [facility] in January 2020 + for E. coli and Enterococcus.     I am not entirely clear on patient's baseline but he currently is lying in bed and unable to give me any history.   Family is not at bedside.     It is my understanding that patient lives at home with family and caregivers and he is not very active, pretty much just sits in the chair, goes to bed and goes to the restroom. According to the chart, patient has been confused for about 1 week with associated hallucinations. The confusion increased yesterday and usually when he gets like this family states he has a urinary tract infection so they brought patient to the emergency department. Patient has been started on meropenem. We were consulted to assist with antimicrobial therapy choice    Current evaluation:2020    BP (!) 160/65   Pulse 65   Temp 98 °F (36.7 °C) (Axillary)   Resp 18   Ht 5' 11\" (1.803 m)   Wt (!) 400 lb (181.4 kg)   SpO2 97%   BMI 55.79 kg/m²     Temperature Range: Temp: 98 °F (36.7 °C) Temp  Av.6 °F (37 °C)  Min: 98 °F (36.7 °C)  Max: 99 °F (37.2 °C)  The patient is seen and evaluated at bedside he is more awake/alert and following commands today. He does answer some questions but not all questions. He follows some commands but not all commands. The patient was on BiPAP overnight. Review of Systems   Unable to perform ROS: Mental status change       Physical Examination :     Physical Exam  Constitutional:       Appearance: He is well-developed. HENT:      Head: Normocephalic and atraumatic. Mouth/Throat:      Mouth: Mucous membranes are dry. Neck:      Musculoskeletal: Neck supple. Cardiovascular:      Rate and Rhythm: Normal rate. Heart sounds: Murmur present. No friction rub. No gallop. Pulmonary:      Effort: Pulmonary effort is normal.      Breath sounds: Normal breath sounds. No wheezing. Abdominal:      General: Bowel sounds are normal.      Palpations: Abdomen is soft. There is no mass. Tenderness: There is no abdominal tenderness. Lymphadenopathy:      Cervical: No cervical adenopathy. Skin:     General: Skin is warm and dry. Comments: Left posterior calf superficial ulceration   Neurological:      Mental Status: He is disoriented. Laboratory data:    I have independently reviewed the followinglabs:  CBC with Differential:   Recent Labs     11/04/20  1239  11/05/20  2317 11/06/20  0607 11/07/20  0624   WBC 6.8   < > 9.3 9.7 9.3   HGB 12.4*   < > 11.7* 11.2* 11.7*   HCT 40.2*   < > 40.6* 38.2* 39.5*   *   < > 133 133* 104*   LYMPHOPCT 12*  --  13*  --   --    MONOPCT 7  --  10*  --   --     < > = values in this interval not displayed. BMP:   Recent Labs     11/06/20  0607 11/07/20  0624    140   K 3.5* 3.5*   CL 98 100   CO2 36* 31   BUN 10 10   CREATININE 0.63* 0.67*   MG 1.6 1.7     Hepatic Function Panel:   Recent Labs     11/04/20  1239   PROT 7.7   LABALBU 3.3*   BILIDIR 0.22   IBILI 0.55   BILITOT 0.77   ALKPHOS 197*   ALT 15   AST 20         Lab Results   Component Value Date    PROCAL 0.15 11/06/2020     Lab Results   Component Value Date    CRP 42.3 11/06/2020    CRP 74.3 08/20/2017    CRP 66.1 05/08/2016     Lab Results   Component Value Date    SEDRATE 77 (H) 11/06/2020         Lab Results   Component Value Date    DDIMER 2.49 05/04/2016     Lab Results   Component Value Date    FERRITIN 109 05/07/2016     No results found for: LDH  No results found for: FIBRINOGEN    Results in Past 30 Days  Result Component Current Result Ref Range Previous Result Ref Range   SARS-CoV-2      (11/5/2020)  Not in Time Range          (11/5/2020)       Not Detected (11/5/2020) Not Detected       Lab Results   Component Value Date    COVID19 Not Detected 11/05/2020       No results for input(s): [facility] in the last 72 hours. Imaging Studies:   CT OF THE HEAD WITHOUT CONTRAST 11/6/2020 5:25 pm  Impression    No acute intracranial abnormality.             ONE XRAY VIEW OF THE CHEST 11/6/2020 1:55 pm   Impression    Bibasilar airspace disease suspected.  Consider formal AP and lateral chest    radiographs for further evaluation.         Moderate cardiomegaly.         Cultures:     Culture, Blood 1 [[id number]]   Collected: 11/04/20 1144    Order Status: Completed  Specimen: Blood  Updated: 11/07/20 0028     Specimen Description  . BLOOD     Special Requests  RIGHT HAND, 10ML, LR     Culture  NO GROWTH 3 DAYS    Culture, Blood 1 [[id number]]   Collected: 11/04/20 1235    Order Status: Completed  Specimen: Blood  Updated: 11/07/20 0028     Specimen Description  . BLOOD     Special Requests  10ML LH     Culture  NO GROWTH 3 DAYS    Culture, Urine [[id number]]   Collected: 11/06/20 1230    Order Status: No result  Specimen: Urine  Updated: 11/06/20 1517    Culture, Urine [[id number]]  (Abnormal)  Collected: 11/04/20 1146    Order Status: Completed  Specimen: Urine, clean catch  Updated: 11/06/20 0807     Specimen Description  . CLEAN CATCH URINE     Special Requests  NOT REPORTED     Culture  Several types of bacteria were identified in this specimen.  Further ID and susceptibility testing is generally not helpful in this circumstance and has not been performed.  Consider recollection if clinically indicated.  Please contact the Micro lab ([phone]) if you feel the management ofthis particular situation would be helped by further testing. Abnormal         Medications:      ciprofloxacin  400 mg Intravenous Q12H    sodium chloride flush  10 mL Intravenous 2 times per day    enoxaparin  40 mg Subcutaneous BID    aspirin  81 mg Oral Daily    tamsulosin  0.4 mg Oral Daily    miconazole   Topical BID    pantoprazole  40 mg Oral QAM AC    venlafaxine  150 mg Oral Daily    bumetanide  2 mg Oral BID    bethanechol  10 mg Oral TID    DULoxetine  60 mg Oral QAM    levothyroxine  150 mcg Oral Daily    metFORMIN  500 mg Oral BID    nadolol  40 mg Oral Daily    pregabalin  150 mg Oral BID    alogliptin  25 mg Oral Daily    rifaximin  550 mg Oral BID    oxybutynin  5 mg Oral BID    naloxegol  25 mg Oral QAM    spironolactone  75 mg Oral BID    potassium chloride  20 mEq Oral Daily with breakfast    insulin glargine  80 Units Subcutaneous BID    insulin lispro

## 2020-11-07 NOTE — PROGRESS NOTES
[id number] [facility] Neurology   IN-PATIENT SERVICE      NEUROLOGY PROGRESS  NOTE            Date:   11/7/2020  Patient name:  Bay Oliveira  Date of admission:  11/4/2020  YOB: 1965      Interval History:     Patient is awake today. He is holding a cup of water and drinking. Continues to have some more ice though. Very disoriented unable to tell me name, place or situation. Clonic jerking appears to be improved. Repeat ABG shows p CO2 down to 54 from 70. Urine culture is pending. Discussed medications with patient's caregiver and pharmacy and he does indeed take Klonopin 1 mg twice daily scheduled in addition to Seroquel 50 mg nightly and Percocet as needed. History of Present Illness: The patient is a 63 y.o. male who presents with Altered Mental Status  . The patient was seen and examined and the chart was reviewed. Patient presents to the hospital on 11/4 with altered mentation described as delirium. He has history of chronic indwelling Clemons and was found to have a suspected urinary tract infection. Patient has been on IV antibiotics since admission. Urinalysis showed large leukocyte esterase, 20-50 WBC, moderate bacteria, culture showing many types of bacteria. Received a dose of IV meropenem, currently on IV Cipro.     Last night was noted to become more lethargic and was seen by primary team.  ABG was drawn showing a PCO2 of 70, PO2 of 68 and patient was placed on BiPAP. He wore for much of the night. There is no episodes of hypoxia noted. This morning he continues to be lethargic, not following commands. He has diffuse myoclonic type jerking movements. He has not had any fevers. White count is normal.  Hemoglobin 11.7. Ammonia is normal.  Blood cultures have been negative x2 days. Repeat urinalysis today shows positive nitrite, small leukocyte esterase, 10-20 WBC, moderate bacteria.   Neurology consulted for altered mentation.     At this time patient is lethargic, moans when stimulated. Not able to answer questions appropriately. He is not following commands. He has diffuse myoclonic jerks present. Home medication list includes Klonopin and Ativan which she has not been receiving since admission, unclear whether he takes this consistently at home. Past Medical History     Past Medical History:   Diagnosis Date    Anxiety and depression     Back pain, chronic     BPH (benign prostatic hyperplasia)     CHF (congestive heart failure) (HCC)     Cirrhosis (HCC)     Diabetes mellitus ([address]     not checking bloodsugar at home    GERD (gastroesophageal reflux disease)     H/O cardiac catheterization not sure of date    no stents    Hepatitis     Pt does not know the type.      Hiatal hernia     History of alcohol abuse     Sober since 2013    Hyperlipidemia     not taking any medication    Hypertension     IBS (irritable bowel syndrome)     Morbid obesity ([address]     Neuropathy     feet,toes    On home oxygen therapy     prn    Pancreatitis     x 5 episodes    Primary osteoarthritis of right knee     Sleep apnea     No machine    Thyroid disease     Urinary bladder neurogenic dysfunction         Past Surgical History:     Past Surgical History:   Procedure Laterality Date    ABDOMEN SURGERY      hernia repair x4 (ventral)    COLONOSCOPY      2012    CYSTOSCOPY  01/24/2018    CYSTOSCOPY  02/20/2019    CYSTOSCOPY N/A 2/20/2019    CYSTOSCOPY DILATION performed by Nora Sheffield MD at [address] [facility] N/A 8/23/2019    CYSTOSCOPY/ DILATION NICOLE CATHETER EXCHANGE performed by Nora Sheffield MD at [address] [facility], DIAGNOSTIC     [address]  05/20/2018    repair and reduction of recurrent incarcerated incisional hernia with mesh    [place] CYSTOURETHROSCOPY N/A 1/24/2018    CYSTOSCOPY Ras Gregorio performed by Nora Sheffield MD at [address] [facility] Bilateral 8/22/2017    FOOT DEBRIDEMENT INCISION AND DRAINAGE with bone bx performed by Christine León DPM at [address] [facility] EGD TRANSORAL BIOPSY SINGLE/MULTIPLE N/A 2017    EGD BIOPSY performed by Rhea Zepeda MD at [facility]  2017    polypectomy    UPPER GASTROINTESTINAL ENDOSCOPY N/A 3/14/2019    EGD BIOPSY performed by Ryder Keller MD at [address] [facility] N/A 2018    REPAIR AND REDUCTION OF RECURRENT INCARCERATED INCISIONAL HERNIA WITH MESH performed by Roxane Mcdonough MD at [address] [facility]        Medications during admission:      sodium chloride flush  10 mL Intravenous 2 times per day    enoxaparin  40 mg Subcutaneous BID    aspirin  81 mg Oral Daily    tamsulosin  0.4 mg Oral Daily    miconazole   Topical BID    pantoprazole  40 mg Oral QAM AC    venlafaxine  150 mg Oral Daily    bumetanide  2 mg Oral BID    bethanechol  10 mg Oral TID    DULoxetine  60 mg Oral QAM    levothyroxine  150 mcg Oral Daily    metFORMIN  500 mg Oral BID    nadolol  40 mg Oral Daily    pregabalin  150 mg Oral BID    alogliptin  25 mg Oral Daily    rifaximin  550 mg Oral BID    oxybutynin  5 mg Oral BID    naloxegol  25 mg Oral QAM    spironolactone  75 mg Oral BID    potassium chloride  20 mEq Oral Daily with breakfast    insulin glargine  80 Units Subcutaneous BID    insulin lispro  0-6 Units Subcutaneous TID WC    insulin lispro  0-3 Units Subcutaneous Nightly         Physical Exam:   BP (!) 155/74   Pulse 75   Temp 98.2 °F (36.8 °C) (Oral)   Resp 18   Ht 5' 11\" (1.803 m)   Wt (!) 400 lb (181.4 kg)   SpO2 97%   BMI 55.79 kg/m²   Temp (24hrs), Av.6 °F (37 °C), Min:98 °F (36.7 °C), Max:99 °F (37.2 °C)          Neurological examination:    Mental status   Awake, easily arousable. Not oriented to self, location or situation. Follows commands appropriately. Speech is intermittent and slow.      Cranial nerves   II - visual fields intact to confrontation; pupils dilated 6 mm, reactive  III, IV, VI - extraocular muscles intact;no nystagmus; no ptosis   V - normal facial sensation                                                               VII - normal facial symmetry                                                             VIII - intact hearing                                                                             IX, X - symmetrical palate elevation                                               XI - symmetrical shoulder shrug                                                       XII - midline tongue without atrophy or fasciculation     Motor function   moves all 4 extremities symmetrically on command  No myoclonic jerking is present                  Sensory function Intact to touch throughout     Cerebellar  not assessed     Reflex function 1/4 symmetric throughout . Gait                   not assessed             Diagnostics:      Laboratory Testing:  CBC:   Recent Labs     11/05/20 2317 11/06/20 0607 11/07/20 0624   WBC 9.3 9.7 9.3   HGB 11.7* 11.2* 11.7*    133* 104*     BMP:    Recent Labs     11/05/20 2317 11/06/20 0607 11/07/20 0624    140 140   K 3.5* 3.5* 3.5*   CL 97* 98 100   CO2 34* 36* 31   BUN 10 10 10   CREATININE 0.74 0.63* 0.67*   GLUCOSE 164* 189* 233*         Lab Results   Component Value Date    CHOL 203 (H) 07/18/2017    LDLCHOLESTEROL 122 07/18/2017    HDL 46 07/18/2017    TRIG 174 (H) 07/18/2017    ALT 15 11/04/2020    AST 20 11/04/2020    TSH 10.40 (H) 11/06/2020    INR 1.0 07/31/2019    LABA1C 7.4 (H) 05/20/2018       Imaging/Diagnostics:      EEG: Moderate encephalopathy. No epileptiform activity. CT head: No acute process     I personally reviewed all of the above medications, clinical laboratory, imaging and other diagnostic tests. Impression:      1. Acute metabolic encephalopathy; possible UTI, hypercapnia. 2. Diffuse myoclonic jerks; likely metabolic in nature.   Low suspicion for seizure. 3. Questionable benzodiazepine withdrawal, Klonopin 1 mg twice daily at home. Plan:      We will reinitiate Klonopin 1 mg twice daily scheduled to see if this helps with a possible benzo withdrawal picture. Confirmed with patient's pharmacy he does receive these in bubble packs and likely takes it as a twice daily scheduled medication.    Continue current UTI treatment as per ID, urology   We will consider MRI, LP if mentation does not continue to improve next 24 hours   Discussed with primary   Discussed with ex-wife Donna Atkinson We will follow        Electronically signed by Torri Pappas DO on 11/7/2020 at 11:56 AM      Humera Moore [facility]  Neurology

## 2020-11-07 NOTE — PROGRESS NOTES
Omaira Baron MD FACS    Urology Progress Note    Subjective: No change urologically.     Patient Vitals for the past 24 hrs:   BP Temp Temp src Pulse Resp SpO2 Height   11/07/20 0500 -- -- -- 135 -- 98 % --   11/07/20 0408 (!) 147/81 98.8 °F (37.1 °C) Axillary 85 20 95 % --   11/07/20 0115 -- -- -- -- -- 95 % --   11/07/20 0012 -- -- -- -- 21 -- --   11/06/20 2345 (!) 164/68 98.6 °F (37 °C) Axillary 79 28 97 % --   11/06/20 2129 -- -- -- -- (!) 32 98 % --   11/06/20 2100 -- -- -- -- -- 97 % --   11/06/20 1914 (!) 163/65 99 °F (37.2 °C) Oral 75 18 96 % --   11/06/20 1745 (!) 173/60 -- -- 89 -- 96 % --   11/06/20 1533 (!) 181/61 98.8 °F (37.1 °C) Axillary 84 20 92 % --   11/06/20 1515 -- -- -- -- 18 96 % --   11/06/20 1330 -- -- -- -- -- -- 5' 11\" (1.803 m)   11/06/20 1151 (!) 167/60 98.2 °F (36.8 °C) Axillary 75 20 97 % --   11/06/20 1151 -- -- -- -- -- 96 % --   11/06/20 0824 -- -- -- -- 19 96 % --   11/06/20 0746 137/89 98.6 °F (37 °C) -- 91 26 97 % --   11/06/20 0615 -- -- -- 80 -- 99 % --       Intake/Output Summary (Last 24 hours) at 11/7/2020 0603  Last data filed at 11/6/2020 1930  Gross per 24 hour   Intake 1101 ml   Output 850 ml   Net 251 ml       Recent Labs     11/05/20  0626 11/05/20  2317 11/06/20  0607   WBC 10.0 9.3 9.7   HGB 12.3* 11.7* 11.2*   HCT 41.3 40.6* 38.2*   MCV 84.6 87.7 87.0    133 133*     Recent Labs     11/05/20  0626 11/05/20  2317 11/06/20  0607    140 140   K 3.2* 3.5* 3.5*   CL 95* 97* 98   CO2 36* 34* 36*   BUN 12 10 10   CREATININE 0.70 0.74 0.63*       Recent Labs     11/06/20  1230   COLORU YELLOW   PHUR 8.5*   WBCUA 10 TO 20   RBCUA 2 TO 5   MUCUS NOT REPORTED   TRICHOMONAS NOT REPORTED   YEAST NOT REPORTED   BACTERIA MODERATE*   SPECGRAV 1.010   LEUKOCYTESUR SMALL*   UROBILINOGEN ELEVATED*   BILIRUBINUR NEGATIVE       Additional Lab/culture results:    Physical Exam: Urine clear via indwelling wells catheter     Interval Imaging Findings:    Impression: Plan:   Continue continuous bladder irrigation. To be given intravesical Neosporin per Dr. Mahnaz Rivera instructions.     Leesa Alarcon  6:03 AM 11/7/2020

## 2020-11-07 NOTE — PROGRESS NOTES
Pt was able to take all morning scheduled medications crushed in applesauce with much convincing, pt had no issues with swallowing

## 2020-11-07 NOTE — PROGRESS NOTES
Am assessment and vitals complete, pt awake but remains confused, pt does follow simple commands, pt more alert and awake than yesterday, no needs voiced, will continue to monitor

## 2020-11-07 NOTE — PROGRESS NOTES
Bhakti Catrachita   Urology Progress Note            Subjective:  Altered mental status   patient seen in conjunction with nursing staff,   follow-up urinary tract infection    Patient Vitals for the past 24 hrs:   BP Temp Temp src Pulse Resp SpO2 Height   11/06/20 1745 (!) 173/60 -- -- 89 -- 96 % --   11/06/20 1533 (!) 181/61 98.8 °F (37.1 °C) Axillary 84 20 92 % --   11/06/20 1515 -- -- -- -- 18 96 % --   11/06/20 1330 -- -- -- -- -- -- 5' 11\" (1.803 m)   11/06/20 1151 (!) 167/60 98.2 °F (36.8 °C) Axillary 75 20 97 % --   11/06/20 1151 -- -- -- -- -- 96 % --   11/06/20 0824 -- -- -- -- 19 96 % --   11/06/20 0746 137/89 98.6 °F (37 °C) -- 91 26 97 % --   11/06/20 0615 -- -- -- 80 -- 99 % --   11/06/20 0413 -- -- -- -- 17 -- --   11/06/20 0330 (!) 147/58 99 °F (37.2 °C) Axillary 68 15 97 % --   11/05/20 2259 (!) 152/60 98.7 °F (37.1 °C) Axillary 71 17 97 % --   11/05/20 2102 (!) 152/60 99.1 °F (37.3 °C) Oral 86 18 97 % --       Intake/Output Summary (Last 24 hours) at 11/6/2020 1911  Last data filed at 11/6/2020 1752  Gross per 24 hour   Intake 3348 ml   Output 1150 ml   Net 2198 ml       Recent Labs     11/05/20 0626 11/05/20  2317 11/06/20 0607   WBC 10.0 9.3 9.7   HGB 12.3* 11.7* 11.2*   HCT 41.3 40.6* 38.2*   MCV 84.6 87.7 87.0    133 133*     Recent Labs     11/05/20 0626 11/05/20  2317 11/06/20  0607    140 140   K 3.2* 3.5* 3.5*   CL 95* 97* 98   CO2 36* 34* 36*   BUN 12 10 10   CREATININE 0.70 0.74 0.63*       Recent Labs     11/06/20  1230   COLORU YELLOW   PHUR 8.5*   WBCUA 10 TO 20   RBCUA 2 TO 5   MUCUS NOT REPORTED   TRICHOMONAS NOT REPORTED   YEAST NOT REPORTED   BACTERIA MODERATE*   SPECGRAV 1.010   LEUKOCYTESUR SMALL*   UROBILINOGEN ELEVATED*   BILIRUBINUR NEGATIVE       Additional Lab/culture results:    Physical Exam: Patient still not responding to commands, he is receiving antibiotic therapy as per infectious disease service, agree with antibiotic therapy provided    Indwelling catheter draining well catheter was changed 2 weeks ago    Interval Imaging Findings:    Impression:    Patient Active Problem List   Diagnosis    Alcoholic cirrhosis of liver ([address]    Peripheral edema    Bipolar disorder ([address]    Type 2 diabetes mellitus with diabetic neuropathy, with long-term current use of insulin ([address]    Essential hypertension    Hyperlipidemia    Weakness    Hyponatremia    Metabolic encephalopathy    FABI (obstructive sleep apnea)    Primary osteoarthritis of right knee    Type 2 diabetes mellitus with diabetic neuropathy (HCC)    Hypothyroidism    Urine retention    Anemia    Cellulitis of right lower extremity    Slow transit constipation    Constipation    Iron deficiency anemia due to chronic blood loss    Gastroesophageal reflux disease without esophagitis    Secondary esophageal varices without bleeding (HCC)    Portal hypertension ([address]    Morbid obesity with BMI of 50.0-59.9, adult (HCC)    Venous stasis dermatitis of both lower extremities    Cellulitis of left lower extremity    Cellulitis of perineum    Epididymoorchitis    Abdominal pain    Multiple and open wound of lower limb    Scrotal edema    Retention, urine    SBO (small bowel obstruction) ([address]    Incisional hernia with obstruction but no gangrene    Chronic indwelling Clemons catheter    Anxiety and depression    Back pain, chronic    BPH (benign prostatic hyperplasia)    Chronic diastolic congestive heart failure (HCC)    Cirrhosis ([address]    Diabetes mellitus ([address]    GERD (gastroesophageal reflux disease)    H/O cardiac catheterization    Hepatitis    Hiatal hernia    History of alcohol abuse    Hypertension    IBS (irritable bowel syndrome)    Morbid obesity ([address]    Neuropathy    On home oxygen therapy    Pancreatitis    Sleep apnea    Thyroid disease    Urinary bladder neurogenic dysfunction    Urinary tract infection associated with indwelling urethral catheter [address] [address]    Delirium due to another medical condition    Musculoskeletal immobility       Plan:  We will change the catheter tomorrow we will consider three-way Clemons with bladder irrigation with Neosporin    Tray Hendrickson  7:11 PM 11/6/2020

## 2020-11-07 NOTE — PROGRESS NOTES
2/20/2019); Upper gastrointestinal endoscopy (N/A, 3/14/2019); and Cystoscopy (N/A, 8/23/2019). Restrictions  Restrictions/Precautions  Restrictions/Precautions: General Precautions  Position Activity Restriction  Other position/activity restrictions: IV, wells, telemetry, confusion  Vision/Hearing  Hearing: Within functional limits     Subjective  General  Chart Reviewed: Yes  Patient assessed for rehabilitation services?: Yes  Additional Pertinent Hx: Etoh cirrhosis, bipolar, obesity  Response To Previous Treatment: Not applicable  Family / Caregiver Present: No  Diagnosis: UTI, metabolic encephalopathy  Follows Commands: Impaired  General Comment  Comments: GERMAIN Key reports patient more alert than yesterday, still very confused. Subjective  Subjective: Patient very confused, unable to answer any PLOF questions. Pain Screening  Patient Currently in Pain: Other (comment)(Patient not alert, sleeping.)     Pre Treatment Pain Screening  Intervention List: Patient able to continue with treatment    Orientation  Orientation  Overall Orientation Status: Impaired  Orientation Level: Oriented to person;Disoriented to place; Disoriented to time;Disoriented to situation  Social/Functional History  Social/Functional History  Lives With: Son  Home Equipment: Rolling walker, [employer]  Additional Comments: Patient unable to accurately answer any PLOF questions. Per chart patient lives with his son and has home health. Patient can typically ambute to bathroom and back. No other information available  Cognition   Cognition  Overall Cognitive Status: Exceptions  Arousal/Alertness: Inconsistent responses to stimuli  Following Commands: Inconsistently follows commands; Follows one step commands with repetition; Follows one step commands with increased time  Attention Span: Unable to maintain attention  Memory: Decreased recall of biographical Information;Decreased recall of recent events  Safety Judgement: Decreased awareness of need for assistance;Decreased awareness of need for safety  Problem Solving: Decreased awareness of errors;Assistance required to identify errors made;Assistance required to generate solutions;Assistance required to implement solutions;Assistance required to correct errors made  Insights: Not aware of deficits  Initiation: Requires cues for all  Sequencing: Requires cues for all  Cognition Comment: Patient making very little when talking, difficult to understand.     Objective     Observation/Palpation  Observation: obese    PROM RLE (degrees)  RLE General PROM: hip flexion 0-60 degrees, hip abduction 10-20 degrees, knee 0-60 degrees, ankle DF to 5 degrees, hip positioned in 20 degrees ER  PROM LLE (degrees)  LLE General PROM: hip flexion 0-40 degrees, hip abduction 15-20 degrees, knee 0-45 degrees, ankle DF to neutral, hip positioned in 30 degrees ER, ankle significantly ER position  PROM RUE (degrees)  RUE PROM: WFL  PROM LUE (degrees)  LUE PROM: WFL  Strength RLE  Comment: hip 2+/5, knee 3-/5, ankle 3+/5  Strength LLE  Comment: hip 2/5, knee 3-/5, ankle 3-/5  Strength RUE  Comment: shoulder 2+/5, elbow 3+/5,  fair-  Strength LUE  Comment: shoulder 2+/5, elbow 3+/5,  fair-  Motor Control  Gross Motor?: Exceptions  Comments: decreased motor planning and motor coordination     Bed mobility  Rolling to Left: Dependent/Total  Rolling to Right: Dependent/Total  Comment: Patient not appropriate to sit EOB  Transfers  Comment: Not appropriate  Ambulation  Ambulation?: No        Exercises  Comments: AAROM BUE and BLE x 15 reps, patient with limited ability to particpate due to confusion     Plan   Plan  Times per week: 1x/d, for 5-6d/wk  Current Treatment Recommendations: Strengthening, ROM, Safety Education & Training, Patient/Caregiver Education & Training, Functional Mobility Training  Safety Devices  Type of devices: Call light within reach, Left in bed, Nurse notified    G-Code       OutComes Score    AM-PAC Score  AM-PAC Inpatient Mobility Raw Score : 7 (11/07/20 1412)  AM-PAC Inpatient T-Scale Score : 26.42 (11/07/20 1412)  Mobility Inpatient CMS 0-100% Score: 92.36 (11/07/20 1412)  Mobility Inpatient CMS G-Code Modifier : CM (11/07/20 1412)          Goals  Short term goals  Time Frame for Short term goals: 12 visits  Short term goal 1: Patient will tolerate 30 minutes of ther-ex and ther-act. Short term goal 2: Patient will demo 3+/5 strength throughout UE and LE. Short term goal 3: Patient will be mod assist x 2 for bed mobility. Short term goal 4: Patient will be reassessed for transfers and gait when medically appropriate.   Patient Goals   Patient goals : Unable to state due to confusion       Therapy Time   Individual Concurrent Group Co-treatment   Time In 1219         Time Out 1254         Minutes 35         Timed Code Treatment Minutes: 800 Ajay Moraes, PT

## 2020-11-07 NOTE — PROGRESS NOTES
[facility]  Office: [address], DO, Corbypa La, DO, Theresa [place], DO, Tangela Osborne Blood, DO, Celena Ariza MD, Gabriel Amos MD, Josie Moseley MD, Darryl Collins MD, Matt Buenrostro MD, Perfecto Baumann MD, Alexandre Savage MD, Tariq Son MD, Emily Salinas MD, Wes Quarles DO, Judy Storm MD, Calixto Valentin MD, Johnny Crabtree DO, Jt Acevedo MD,  Gaviota Ramos, DO, Brian Street MD, Galo Rey MD, Raymundo Marquez, [facility], [facility], [facility], Milton Berry, CNP, Flynn Merchant, CNS, Sheldon Richardson, CNP, Vivi Solorio, CNP, Yoni Rizzo, CNP, Alia Young, CNP, Fareed De Luna, CNP, El Chance PA-C, Yoel Myles, [facility], Mari Moseley, CNP, Lieutenant Rios, CNP, Nuria Velásquez, CNP, Kolby Louie, CNP, Morgan Metzger, [facility]    Progress Note    11/7/2020    11:17 AM    Name:   Oren Woodward  MRN:     [id number]     [place]:      [de-identified]   Room:   [facility]26-01   Day:  3  Admit Date:  11/4/2020 11:46 AM    PCP:   Marce Butts MD  Code Status:  Full Code    Subjective:     C/C:   Chief Complaint   Patient presents with    Altered Mental Status     Interval History Status: not changed. No issues overnight  Remains altered  More responsive today  Responding inappropriately  Neuro workup in progress  Case discussed with Dr Mildred Prince    Brief History:     63 M who presented with AMS. Currently altered, unable to provide history. Per chart review presented with worsening confusion. Has chronic indwelling wells, follows with urology. In ER UA demonstrating UTI. Review of Systems:     Unable to obtain - altered     Medications: Allergies:     Allergies   Allergen Reactions    No Known Allergies        Current Meds:   Scheduled Meds:    sodium chloride flush  10 mL Intravenous 2 times per day    enoxaparin  40 mg Subcutaneous BID    aspirin  81 mg Oral Daily    tamsulosin  0.4 mg Oral Daily    miconazole   Topical BID    pantoprazole  40 mg Oral QAM AC    venlafaxine  150 mg Oral Daily    bumetanide  2 mg Oral BID    bethanechol  10 mg Oral TID    DULoxetine  60 mg Oral QAM    levothyroxine  150 mcg Oral Daily    metFORMIN  500 mg Oral BID    nadolol  40 mg Oral Daily    pregabalin  150 mg Oral BID    alogliptin  25 mg Oral Daily    rifaximin  550 mg Oral BID    oxybutynin  5 mg Oral BID    naloxegol  25 mg Oral QAM    spironolactone  75 mg Oral BID    potassium chloride  20 mEq Oral Daily with breakfast    insulin glargine  80 Units Subcutaneous BID    insulin lispro  0-6 Units Subcutaneous TID WC    insulin lispro  0-3 Units Subcutaneous Nightly     Continuous Infusions:    sodium chloride 75 mL/hr at 11/06/20 1908    dextrose       PRN Meds: lidocaine, metoprolol, potassium chloride, LORazepam, sodium chloride flush, acetaminophen **OR** acetaminophen, polyethylene glycol, promethazine **OR** ondansetron, nicotine, albuterol sulfate HFA, clonazePAM, oxyCODONE-acetaminophen, hydrOXYzine, ibuprofen, glucose, dextrose, glucagon (rDNA), dextrose, albuterol    Data:     Past Medical History:   has a past medical history of Anxiety and depression, Back pain, chronic, BPH (benign prostatic hyperplasia), CHF (congestive heart failure) ([facility] Cirrhosis ([facility] Diabetes mellitus ([facility] GERD (gastroesophageal reflux disease), H/O cardiac catheterization, Hepatitis, Hiatal hernia, History of alcohol abuse, Hyperlipidemia, Hypertension, IBS (irritable bowel syndrome), Morbid obesity ([facility] Neuropathy, On home oxygen therapy, Pancreatitis, Primary osteoarthritis of right knee, Sleep apnea, Thyroid disease, and Urinary bladder neurogenic dysfunction. Social History:   reports that he has never smoked. He has never used smokeless tobacco. He reports that he does not drink alcohol or use drugs.      Family History:   Family History   Adopted: Yes       Vitals:  BP (!) 155/74   Pulse 75   Temp 98.2 °F (36.8 °C) (Oral)   Resp 18   Ht 5' 11\" (1.803 m)   Wt (!) 400 lb (181.4 kg)   SpO2 97%   BMI 55.79 kg/m²   Temp (24hrs), Av.5 °F (36.9 °C), Min:98 °F (36.7 °C), Max:99 °F (37.2 °C)    Recent Labs     20  1205 20  1650 20  1914 20  0820   POCGLU 172* 179* 200* 213*       I/O (24Hr):     Intake/Output Summary (Last 24 hours) at 2020 1117  Last data filed at 2020 1100  Gross per 24 hour   Intake 1470 ml   Output 2050 ml   Net -580 ml       Labs:  Hematology:  Recent Labs     20  23120  0607 20  0624   WBC 9.3 9.7 9.3   RBC 4.63 4.39 4.58   HGB 11.7* 11.2* 11.7*   HCT 40.6* 38.2* 39.5*   MCV 87.7 87.0 86.2   MCH 25.3* 25.5 25.5   MCHC 28.8* 29.3 29.6   RDW 15.1* 15.0* 14.9*    133* 104*   MPV NOT REPORTED 10.9 10.0   SEDRATE  --  77*  --    CRP  --  42.3*  --      Chemistry:  Recent Labs     20  1239 20  0626 20  23120  0607 20  0624    141 140 140 140   K 3.3* 3.2* 3.5* 3.5* 3.5*   CL 92* 95* 97* 98 100   CO2 37* 36* 34* 36* 31   GLUCOSE 165* 182* 164* 189* 233*   BUN 13 12 10 10 10   CREATININE 0.71 0.70 0.74 0.63* 0.67*   MG  --  1.6  --  1.6 1.7   ANIONGAP 9 10 9 6* 9   LABGLOM >60 >60 >60 >60 >60   GFRAA >60 >60 >60 >60 >60   CALCIUM 9.2 9.3 8.6 8.9 8.9   TROPHS 11  --   --   --   --    CKTOTAL 45  --   --   --   --    MYOGLOBIN 64  --   --   --   --      Recent Labs     20  1239  20  2253 20  2317 20  0607 20  0744 20  1205 20  1650 20  1914 20  0820   PROT 7.7  --   --   --   --   --   --   --   --   --    LABALBU 3.3*  --   --   --   --   --   --   --   --   --    TSH  --   --   --   --  10.40*  --   --   --   --   --    AST 20  --   --   --   --   --   --   --   --   --    ALT 15  --   --   --   --   --   --   --   --   --    ALKPHOS 197*  --   --   --   --   --   --   --   --   --    BILITOT 0.77  --   --   --   --   --   --   --   --   -- BILIDIR 0.22  --   --   --   --   --   --   --   --   --    AMMONIA 47  --   --  37  --   --   --   --   --   --    POCGLU  --    < > 172*  --   --  172* 172* 179* 200* 213*    < > = values in this interval not displayed. ABG:  Lab Results   Component Value Date    POCPH 7.42 11/06/2020    PHART 7.330 06/18/2014    POCPCO2 54 11/06/2020    JJP2DTO 68.0 06/18/2014    POCPO2 69 11/06/2020    PO2ART 84.0 06/18/2014    POCHCO3 34.9 11/06/2020    ZEK6OCL 34.9 06/18/2014    NBEA NOT REPORTED 11/06/2020    PBEA 10 11/06/2020    CTN6ZQB 37 11/06/2020    STBU5LMT 93 11/06/2020    Q0GCXXDA 96.5 06/18/2014    FIO2 30.0 11/06/2020     Lab Results   Component Value Date/Time    SPECIAL 10ML LH 11/04/2020 12:35 PM     Lab Results   Component Value Date/Time    CULTURE NO GROWTH 3 DAYS 11/04/2020 12:35 PM       Radiology:  Ct Head Wo Contrast    Result Date: 11/4/2020  No acute intracranial abnormality. Xr Chest Portable    Result Date: 11/4/2020  Small right lung base opacity is nonspecific but likely atelectasis and a pleural effusion. Pneumonia is not excluded but felt to be less likely.        Physical Examination:        General appearance:  alert, no distress  Mental Status:  Altered, incomprehensible speech   Lungs:  clear to auscultation bilaterally, normal effort  Heart:  regular rate and rhythm  Abdomen:  soft, nontender, nondistended, normal bowel sounds  Extremities:  no edema, redness, tenderness in the calves  Skin:  no gross lesions, rashes, induration    Assessment:        Hospital Problems           Last Modified POA    * (Principal) Urinary tract infection associated with indwelling urethral catheter ([facility] 62/9/0737 Yes    Alcoholic cirrhosis of liver ([facility] 11/4/2020 Yes    Bipolar disorder ([facility] 11/4/2020 Yes    Type 2 diabetes mellitus with diabetic neuropathy, with long-term current use of insulin ([facility] 11/4/2020 Yes    Hyperlipidemia 08/1/4884 Yes    Metabolic encephalopathy 44/2/6980 Yes    FABI (obstructive sleep apnea) 11/4/2020 Yes    Hypothyroidism 11/4/2020 Yes    Urine retention 11/4/2020 Yes    Chronic indwelling Clemons catheter (Chronic) 11/4/2020 Yes    Back pain, chronic 11/4/2020 Yes    Chronic diastolic congestive heart failure ([facility] 11/4/2020 Yes    GERD (gastroesophageal reflux disease) 11/4/2020 Yes    Morbid obesity ([facility] 11/4/2020 Yes    Delirium due to another medical condition 11/4/2020 Yes    Musculoskeletal immobility 11/4/2020 Yes    Pyocystis 11/7/2020 Yes          Plan:        - Vitals, labs, imaging, medications reviewed  - Urology following - case discussed with Dr Alexandre Joseph - plan bladder irrigation with neosporin  - ID following - hold further IV antibiotics  - Neurology following - case discussed with Dr Barber Bonilla - check EEG  - Monitor mental status - slight improvement  - Follow up repeat culture  - Neurological workup in progress      Ciro Pallas, MD  11/7/2020  11:17 AM

## 2020-11-07 NOTE — PLAN OF CARE
Problem: Skin Integrity:  Goal: Will show no infection signs and symptoms  Description: Will show no infection signs and symptoms  Outcome: Ongoing  Goal: Absence of new skin breakdown  Description: Absence of new skin breakdown  Outcome: Ongoing     Problem: Sensory:  Goal: General experience of comfort will improve  Description: General experience of comfort will improve  Outcome: Ongoing     Problem: Urinary Elimination:  Goal: Signs and symptoms of infection will decrease  Description: Signs and symptoms of infection will decrease  Outcome: Ongoing     Problem: Falls - Risk of:  Goal: Will remain free from falls  Description: Will remain free from falls  11/7/2020 0323 by Justine Esquivel RN  Outcome: Ongoing  11/6/2020 1848 by Josh Thorpe RN  Outcome: Ongoing  Goal: Absence of physical injury  Description: Absence of physical injury  Outcome: Ongoing

## 2020-11-07 NOTE — PROGRESS NOTES
Maribeth Keys   Urology Progress Note            Subjective: Late entry, urinary tract infections catheter associated UTI    Patient Vitals for the past 24 hrs:   BP Temp Temp src Pulse Resp SpO2 Height   11/07/20 0500 -- -- -- 135 -- 98 % --   11/07/20 0408 (!) 147/81 98.8 °F (37.1 °C) Axillary 85 20 95 % --   11/07/20 0115 -- -- -- -- -- 95 % --   11/07/20 0012 -- -- -- -- 21 -- --   11/06/20 2345 (!) 164/68 98.6 °F (37 °C) Axillary 79 28 97 % --   11/06/20 2129 -- -- -- -- (!) 32 98 % --   11/06/20 2100 -- -- -- -- -- 97 % --   11/06/20 1914 (!) 163/65 99 °F (37.2 °C) Oral 75 18 96 % --   11/06/20 1745 (!) 173/60 -- -- 89 -- 96 % --   11/06/20 1533 (!) 181/61 98.8 °F (37.1 °C) Axillary 84 20 92 % --   11/06/20 1515 -- -- -- -- 18 96 % --   11/06/20 1330 -- -- -- -- -- -- 5' 11\" (1.803 m)   11/06/20 1151 (!) 167/60 98.2 °F (36.8 °C) Axillary 75 20 97 % --   11/06/20 1151 -- -- -- -- -- 96 % --   11/06/20 0824 -- -- -- -- 19 96 % --   11/06/20 0746 137/89 98.6 °F (37 °C) -- 91 26 97 % --       Intake/Output Summary (Last 24 hours) at 11/7/2020 0624  Last data filed at 11/6/2020 1930  Gross per 24 hour   Intake 990 ml   Output 850 ml   Net 140 ml       Recent Labs     11/05/20  0626 11/05/20  2317 11/06/20  0607   WBC 10.0 9.3 9.7   HGB 12.3* 11.7* 11.2*   HCT 41.3 40.6* 38.2*   MCV 84.6 87.7 87.0    133 133*     Recent Labs     11/05/20  0626 11/05/20  2317 11/06/20  0607    140 140   K 3.2* 3.5* 3.5*   CL 95* 97* 98   CO2 36* 34* 36*   BUN 12 10 10   CREATININE 0.70 0.74 0.63*       Recent Labs     11/06/20  1230   COLORU YELLOW   PHUR 8.5*   WBCUA 10 TO 20   RBCUA 2 TO 5   MUCUS NOT REPORTED   TRICHOMONAS NOT REPORTED   YEAST NOT REPORTED   BACTERIA MODERATE*   SPECGRAV 1.010   LEUKOCYTESUR SMALL*   UROBILINOGEN ELEVATED*   BILIRUBINUR NEGATIVE       Additional Lab/culture results:    Physical Exam: We are following this patient for urinary tract infection and catheter associated UTI he has been started on IV antibiotic in the emergency room we agree with the antibiotic regimen pending the culture results    Interval Imaging Findings:    Impression:    Patient Active Problem List   Diagnosis    Alcoholic cirrhosis of liver ([address]    Peripheral edema    Bipolar disorder ([address]    Type 2 diabetes mellitus with diabetic neuropathy, with long-term current use of insulin ([address]    Essential hypertension    Hyperlipidemia    Weakness    Hyponatremia    Metabolic encephalopathy    FABI (obstructive sleep apnea)    Primary osteoarthritis of right knee    Type 2 diabetes mellitus with diabetic neuropathy ([address]    Hypothyroidism    Urine retention    Anemia    Cellulitis of right lower extremity    Slow transit constipation    Constipation    Iron deficiency anemia due to chronic blood loss    Gastroesophageal reflux disease without esophagitis    Secondary esophageal varices without bleeding (HCC)    Portal hypertension ([address]    Morbid obesity with BMI of 50.0-59.9, adult (HCC)    Venous stasis dermatitis of both lower extremities    Cellulitis of left lower extremity    Cellulitis of perineum    Epididymoorchitis    Abdominal pain    Multiple and open wound of lower limb    Scrotal edema    Retention, urine    SBO (small bowel obstruction) ([address]    Incisional hernia with obstruction but no gangrene    Chronic indwelling Clemons catheter    Anxiety and depression    Back pain, chronic    BPH (benign prostatic hyperplasia)    Chronic diastolic congestive heart failure (HCC)    Cirrhosis ([address]    Diabetes mellitus ([address]    GERD (gastroesophageal reflux disease)    H/O cardiac catheterization    Hepatitis    Hiatal hernia    History of alcohol abuse    Hypertension    IBS (irritable bowel syndrome)    Morbid obesity ([address]    Neuropathy    On home oxygen therapy    Pancreatitis    Sleep apnea    Thyroid disease    Urinary bladder neurogenic dysfunction    Urinary tract infection associated with indwelling urethral catheter ([facility]    Delirium due to another medical condition    Musculoskeletal immobility    Pyocystis       Plan: Further evaluation of altered mental status, I have taking care of this patient before with no prior similar history, he has been always s alert and communicative    I have left a message for a family member to call me when they are available so we can discuss his altered mental status    Marysol Arce  6:24 AM 11/7/2020

## 2020-11-07 NOTE — PROGRESS NOTES
Home wells removed. Dr. Alfonzo Garrett at bedside to insert wells. Lidocaine applied. An 18 Indian 3-way wells catheter was inserted. Pt tolerated procedure well. NS irrigation hung for bladder irrigation. Will continue to monitor.

## 2020-11-07 NOTE — PROCEDURES
This patient has enlarged prostate and difficult catheterization, he has an indwelling Clemons, concerned about urinary tract infection and pyocystis.     We proceeded with prepping the genitalia with Hibiclens, lidocaine 2% gel instilled per urethra    A size 20 three-way Clemons was inserted,    Bladder irrigation initiated with 1000 mL of sterile saline    Orders placed to switch to Neosporin irrigation to follow

## 2020-11-07 NOTE — PROGRESS NOTES
Wells from home removed per order. Dr. Keaton Perez at bedside to insert an 18-Indian 3-way wells. Lidocaine applied prior to insertion. Pt tolerated procedure well. NS irrigation hung for the night per Dr. Keaton Perez. Will continue to monitor.

## 2020-11-08 LAB
CULTURE: ABNORMAL
CULTURE: ABNORMAL
GLUCOSE BLD-MCNC: 149 MG/DL (ref 75–110)
GLUCOSE BLD-MCNC: 171 MG/DL (ref 75–110)
GLUCOSE BLD-MCNC: 172 MG/DL (ref 75–110)
GLUCOSE BLD-MCNC: 177 MG/DL (ref 75–110)
Lab: ABNORMAL
SPECIMEN DESCRIPTION: ABNORMAL

## 2020-11-08 PROCEDURE — 99232 SBSQ HOSP IP/OBS MODERATE 35: CPT | Performed by: INTERNAL MEDICINE

## 2020-11-08 PROCEDURE — 2060000000 HC ICU INTERMEDIATE R&B

## 2020-11-08 PROCEDURE — 2580000003 HC RX 258: Performed by: NURSE PRACTITIONER

## 2020-11-08 PROCEDURE — 99232 SBSQ HOSP IP/OBS MODERATE 35: CPT | Performed by: PSYCHIATRY & NEUROLOGY

## 2020-11-08 PROCEDURE — 82947 ASSAY GLUCOSE BLOOD QUANT: CPT

## 2020-11-08 PROCEDURE — 51702 INSERT TEMP BLADDER CATH: CPT

## 2020-11-08 PROCEDURE — 6370000000 HC RX 637 (ALT 250 FOR IP): Performed by: NURSE PRACTITIONER

## 2020-11-08 PROCEDURE — 6370000000 HC RX 637 (ALT 250 FOR IP): Performed by: PSYCHIATRY & NEUROLOGY

## 2020-11-08 PROCEDURE — 6360000002 HC RX W HCPCS: Performed by: NURSE PRACTITIONER

## 2020-11-08 PROCEDURE — 94761 N-INVAS EAR/PLS OXIMETRY MLT: CPT

## 2020-11-08 PROCEDURE — 94660 CPAP INITIATION&MGMT: CPT

## 2020-11-08 PROCEDURE — 2700000000 HC OXYGEN THERAPY PER DAY

## 2020-11-08 RX ADMIN — ENOXAPARIN SODIUM 40 MG: 40 INJECTION SUBCUTANEOUS at 11:21

## 2020-11-08 RX ADMIN — HYDROXYZINE HYDROCHLORIDE 25 MG: 25 TABLET, FILM COATED ORAL at 11:20

## 2020-11-08 RX ADMIN — INSULIN LISPRO 1 UNITS: 100 INJECTION, SOLUTION INTRAVENOUS; SUBCUTANEOUS at 13:26

## 2020-11-08 RX ADMIN — DULOXETINE HYDROCHLORIDE 60 MG: 60 CAPSULE, DELAYED RELEASE ORAL at 11:32

## 2020-11-08 RX ADMIN — OXYBUTYNIN CHLORIDE 5 MG: 5 TABLET ORAL at 21:11

## 2020-11-08 RX ADMIN — SODIUM CHLORIDE: 9 INJECTION, SOLUTION INTRAVENOUS at 15:05

## 2020-11-08 RX ADMIN — LEVOTHYROXINE SODIUM 150 MCG: 0.15 TABLET ORAL at 05:13

## 2020-11-08 RX ADMIN — ALOGLIPTIN 25 MG: 25 TABLET, FILM COATED ORAL at 11:19

## 2020-11-08 RX ADMIN — PANTOPRAZOLE SODIUM 40 MG: 40 TABLET, DELAYED RELEASE ORAL at 05:13

## 2020-11-08 RX ADMIN — SPIRONOLACTONE 75 MG: 25 TABLET ORAL at 21:10

## 2020-11-08 RX ADMIN — CLONAZEPAM 1 MG: 0.5 TABLET ORAL at 21:10

## 2020-11-08 RX ADMIN — VENLAFAXINE HYDROCHLORIDE 150 MG: 75 CAPSULE, EXTENDED RELEASE ORAL at 11:20

## 2020-11-08 RX ADMIN — INSULIN LISPRO 1 UNITS: 100 INJECTION, SOLUTION INTRAVENOUS; SUBCUTANEOUS at 18:31

## 2020-11-08 RX ADMIN — METFORMIN HYDROCHLORIDE 500 MG: 500 TABLET ORAL at 18:31

## 2020-11-08 RX ADMIN — METFORMIN HYDROCHLORIDE 500 MG: 500 TABLET ORAL at 11:19

## 2020-11-08 RX ADMIN — PREGABALIN 150 MG: 75 CAPSULE ORAL at 11:19

## 2020-11-08 RX ADMIN — ENOXAPARIN SODIUM 40 MG: 40 INJECTION SUBCUTANEOUS at 21:11

## 2020-11-08 RX ADMIN — TAMSULOSIN HYDROCHLORIDE 0.4 MG: 0.4 CAPSULE ORAL at 11:20

## 2020-11-08 RX ADMIN — IBUPROFEN 600 MG: 600 TABLET, FILM COATED ORAL at 00:10

## 2020-11-08 RX ADMIN — OXYBUTYNIN CHLORIDE 5 MG: 5 TABLET ORAL at 11:20

## 2020-11-08 RX ADMIN — PREGABALIN 150 MG: 75 CAPSULE ORAL at 21:10

## 2020-11-08 RX ADMIN — SPIRONOLACTONE 75 MG: 25 TABLET ORAL at 11:21

## 2020-11-08 RX ADMIN — NADOLOL 40 MG: 20 TABLET ORAL at 11:21

## 2020-11-08 RX ADMIN — POTASSIUM CHLORIDE 20 MEQ: 20 TABLET, EXTENDED RELEASE ORAL at 11:32

## 2020-11-08 RX ADMIN — LORAZEPAM 0.5 MG: 2 INJECTION INTRAMUSCULAR; INTRAVENOUS at 18:35

## 2020-11-08 RX ADMIN — INSULIN LISPRO 1 UNITS: 100 INJECTION, SOLUTION INTRAVENOUS; SUBCUTANEOUS at 21:11

## 2020-11-08 RX ADMIN — ASPIRIN 81 MG: 81 TABLET, COATED ORAL at 11:21

## 2020-11-08 RX ADMIN — BUMETANIDE 2 MG: 1 TABLET ORAL at 11:20

## 2020-11-08 RX ADMIN — ONDANSETRON 4 MG: 2 INJECTION INTRAMUSCULAR; INTRAVENOUS at 11:38

## 2020-11-08 RX ADMIN — RIFAXIMIN 550 MG: 550 TABLET ORAL at 11:20

## 2020-11-08 RX ADMIN — INSULIN LISPRO 1 UNITS: 100 INJECTION, SOLUTION INTRAVENOUS; SUBCUTANEOUS at 10:04

## 2020-11-08 RX ADMIN — RIFAXIMIN 550 MG: 550 TABLET ORAL at 21:10

## 2020-11-08 RX ADMIN — CLONAZEPAM 1 MG: 0.5 TABLET ORAL at 11:16

## 2020-11-08 RX ADMIN — SODIUM CHLORIDE: 9 INJECTION, SOLUTION INTRAVENOUS at 01:45

## 2020-11-08 RX ADMIN — BUMETANIDE 2 MG: 1 TABLET ORAL at 18:31

## 2020-11-08 RX ADMIN — ANTI-FUNGAL POWDER MICONAZOLE NITRATE TALC FREE: 1.42 POWDER TOPICAL at 11:24

## 2020-11-08 RX ADMIN — ACETAMINOPHEN 650 MG: 325 TABLET ORAL at 06:32

## 2020-11-08 ASSESSMENT — PAIN DESCRIPTION - LOCATION
LOCATION: ARM;LEG
LOCATION: ARM;LEG

## 2020-11-08 ASSESSMENT — PAIN SCALES - GENERAL
PAINLEVEL_OUTOF10: 0
PAINLEVEL_OUTOF10: 5
PAINLEVEL_OUTOF10: 0
PAINLEVEL_OUTOF10: 0
PAINLEVEL_OUTOF10: 7
PAINLEVEL_OUTOF10: 0

## 2020-11-08 ASSESSMENT — PAIN SCALES - WONG BAKER

## 2020-11-08 ASSESSMENT — PAIN DESCRIPTION - ONSET
ONSET: GRADUAL
ONSET: GRADUAL

## 2020-11-08 ASSESSMENT — PAIN DESCRIPTION - PROGRESSION
CLINICAL_PROGRESSION: GRADUALLY WORSENING
CLINICAL_PROGRESSION: GRADUALLY WORSENING

## 2020-11-08 ASSESSMENT — PAIN DESCRIPTION - FREQUENCY
FREQUENCY: CONTINUOUS
FREQUENCY: INTERMITTENT

## 2020-11-08 ASSESSMENT — PAIN - FUNCTIONAL ASSESSMENT
PAIN_FUNCTIONAL_ASSESSMENT: PREVENTS OR INTERFERES SOME ACTIVE ACTIVITIES AND ADLS
PAIN_FUNCTIONAL_ASSESSMENT: PREVENTS OR INTERFERES SOME ACTIVE ACTIVITIES AND ADLS

## 2020-11-08 ASSESSMENT — PAIN DESCRIPTION - ORIENTATION
ORIENTATION: RIGHT;LEFT
ORIENTATION: RIGHT;LEFT

## 2020-11-08 ASSESSMENT — PAIN DESCRIPTION - DESCRIPTORS
DESCRIPTORS: ACHING;DISCOMFORT
DESCRIPTORS: DISCOMFORT;ACHING

## 2020-11-08 ASSESSMENT — PAIN DESCRIPTION - PAIN TYPE
TYPE: ACUTE PAIN
TYPE: ACUTE PAIN

## 2020-11-08 NOTE — PLAN OF CARE
Problem: Skin Integrity:  Goal: Will show no infection signs and symptoms  Description: Will show no infection signs and symptoms  11/8/2020 1514 by Camron Moseley RN  Outcome: Ongoing  Speciality mattress properly inflated   Q2 turn  Cream to coccyx   Bilateral heels elevated off bed   Bilateral legs moisturized and wrapped with ACE. Left leg wound dressed. Problem: Skin Integrity:  Goal: Absence of new skin breakdown  Description: Absence of new skin breakdown  11/8/2020 1514 by Camron Moseley RN  Outcome: Ongoing     Problem: Falls - Risk of:  Goal: Will remain free from falls  Description: Will remain free from falls  11/8/2020 1514 by Camron Moseley RN  Outcome: Ongoing  Siderails up x 2  Hourly rounding. Call light in reach. Instructed to call for assist before attempting out of bed. Remains free from falls and accidental injury at this time. Floor free from obstacles, and bed is locked and in lowest position. Adequate lighting provided. Bed alarm on. Fall sticker on wristband.  Fall Sign posted in doorway       Problem: Falls - Risk of:  Goal: Absence of physical injury  Description: Absence of physical injury  11/8/2020 1514 by Camron Moseley RN  Outcome: Ongoing     Problem: Sensory:  Goal: General experience of comfort will improve  Description: General experience of comfort will improve  Outcome: Ongoing     Problem: Urinary Elimination:  Goal: Signs and symptoms of infection will decrease  Description: Signs and symptoms of infection will decrease  Outcome: Ongoing  Clemons in place and patent      Problem: Urinary Elimination:  Goal: Ability to reestablish a normal urinary elimination pattern will improve - after catheter removal  Description: Ability to reestablish a normal urinary elimination pattern will improve  Outcome: Ongoing     Problem: Urinary Elimination:  Goal: Complications related to the disease process, condition or treatment will be avoided or minimized  Description:

## 2020-11-08 NOTE — PROGRESS NOTES
[facility]   IN-PATIENT SERVICE      NEUROLOGY PROGRESS  NOTE            Date:   11/8/2020  Patient name:  Carolyn Nagel  Date of admission:  11/4/2020  YOB: 1965      Interval History:     Awake watching TV. Continues to be disoriented, but slightly improved. Able to tell me he is in the hospital, unclear why though. Cannot tell me the name of his kids. Able to tell me date of birth. Does not know year or location. Myoclonic jerks appear resolved. Urine culture growing E. coli and Enterococcus, ID does not believe these are pathogens at this time. Wearing BiPAP at night. Back on Klonopin 1 mg twice daily scheduled. History of Present Illness: The patient is a 61 y. o. male who presents with Altered Mental Status  .  The patient was seen and examined and the chart was reviewed.  Patient presents to the hospital on 11/4 with altered mentation described as delirium. [facility] has history of chronic indwelling Clemons and was found to have a suspected urinary tract infection.  Patient has been on IV antibiotics since admission.  Urinalysis showed large leukocyte esterase, 20-50 WBC, moderate bacteria, culture showing many types of bacteria.  Received a dose of IV meropenem, currently on IV Cipro.     Last night was noted to become more lethargic and was seen by primary team.  ABG was drawn showing a PCO2 of 70, PO2 of 68 and patient was placed on BiPAP.  He wore for much of the night.  There is no episodes of hypoxia noted.  This morning he continues to be lethargic, not following commands.  He has diffuse myoclonic type jerking movements.  He has not had any fevers.  White count is normal.  Hemoglobin 11.7.  Ammonia is normal.  Blood cultures have been negative x2 days.  Repeat urinalysis today shows positive nitrite, small leukocyte esterase, 10-20 WBC, moderate bacteria.  Neurology consulted for altered mentation.     At this time patient is lethargic, moans when stimulated.  Not able to answer questions appropriately. Isela Ferguson is not following commands.  He has diffuse myoclonic jerks present.  Home medication list includes Klonopin and Ativan which she has not been receiving since admission, unclear whether he takes this consistently at home. Past Medical History:     Past Medical History:   Diagnosis Date    Anxiety and depression     Back pain, chronic     BPH (benign prostatic hyperplasia)     CHF (congestive heart failure) (HCC)     Cirrhosis (HCC)     Diabetes mellitus (HCC)     not checking bloodsugar at home    GERD (gastroesophageal reflux disease)     H/O cardiac catheterization not sure of date    no stents    Hepatitis     Pt does not know the type.      Hiatal hernia     History of alcohol abuse     Sober since 2013    Hyperlipidemia     not taking any medication    Hypertension     IBS (irritable bowel syndrome)     Morbid obesity ([address]     Neuropathy     feet,toes    On home oxygen therapy     prn    Pancreatitis     x 5 episodes    Primary osteoarthritis of right knee     Sleep apnea     No machine    Thyroid disease     Urinary bladder neurogenic dysfunction         Past Surgical History:     Past Surgical History:   Procedure Laterality Date    ABDOMEN SURGERY      hernia repair x4 (ventral)    COLONOSCOPY      2012    CYSTOSCOPY  01/24/2018    CYSTOSCOPY  02/20/2019    CYSTOSCOPY N/A 2/20/2019    CYSTOSCOPY DILATION performed by Oracio Bennett MD at [address] [facility] N/A 8/23/2019    CYSTOSCOPY/ DILATION NICOLE CATHETER EXCHANGE performed by Oracio Bennett MD at [address], COLON, DIAGNOSTIC     [address] [facility] [address]  05/20/2018    repair and reduction of recurrent incarcerated incisional hernia with mesh    [place] CYSTOURETHROSCOPY N/A 1/24/2018    CYSTOSCOPY [place] performed by Oracio Bennett MD at [address] Bilateral 8/22/2017    FOOT [address] [facility] with bone bx performed by Aquiles Tolentino DPM at [address] EGD TRANSORAL BIOPSY SINGLE/MULTIPLE N/A 2017    EGD BIOPSY performed by Geetha Bedoya MD at [place] [address]  2017    polypectomy    UPPER GASTROINTESTINAL ENDOSCOPY N/A 3/14/2019    EGD BIOPSY performed by Juliet Reese MD at [address] [facility] N/A 2018    REPAIR AND REDUCTION OF RECURRENT INCARCERATED INCISIONAL HERNIA WITH MESH performed by Yaritza Ramos MD at [address] [facility]        Medications during admission:      clonazePAM  1 mg Oral BID    sodium chloride flush  10 mL Intravenous 2 times per day    enoxaparin  40 mg Subcutaneous BID    aspirin  81 mg Oral Daily    tamsulosin  0.4 mg Oral Daily    miconazole   Topical BID    pantoprazole  40 mg Oral QAM AC    venlafaxine  150 mg Oral Daily    bumetanide  2 mg Oral BID    bethanechol  10 mg Oral TID    DULoxetine  60 mg Oral QAM    levothyroxine  150 mcg Oral Daily    metFORMIN  500 mg Oral BID    nadolol  40 mg Oral Daily    pregabalin  150 mg Oral BID    alogliptin  25 mg Oral Daily    rifaximin  550 mg Oral BID    oxybutynin  5 mg Oral BID    naloxegol  25 mg Oral QAM    spironolactone  75 mg Oral BID    potassium chloride  20 mEq Oral Daily with breakfast    insulin glargine  80 Units Subcutaneous BID    insulin lispro  0-6 Units Subcutaneous TID WC    insulin lispro  0-3 Units Subcutaneous Nightly         Physical Exam:   BP (!) 167/68   Pulse 60   Temp 98.2 °F (36.8 °C) (Oral)   Resp 20   Ht 5' 11\" (1.803 m)   Wt (!) 400 lb (181.4 kg)   SpO2 (!) 83%   BMI 55.79 kg/m²   Temp (24hrs), Av °F (36.7 °C), Min:97.3 °F (36.3 °C), Max:98.4 °F (36.9 °C)        Neurological examination:    Mental status   Alert and oriented to self, birthdate. Unaware of location, year. Asking who his children are; following all commands; speech is slow.      Cranial nerves   II - visual fields intact to confrontation; pupils reactive  III, IV, VI - extraocular muscles intact; no MYRON; no nystagmus; no ptosis   V - normal facial sensation                                                               VII - normal facial symmetry                                                             VIII - intact hearing                                                                             IX, X - symmetrical palate elevation                                               XI - symmetrical shoulder shrug                                                       XII - midline tongue without atrophy or fasciculation     Motor function   moves all 4 extremities symmetrically   myoclonic jerking is resolved             Sensory function Intact to touch throughout     Cerebellar Intact finger-nose-finger testing. Reflex function 1/4 symmetric throughout . Gait                   not assessed             Diagnostics:      Laboratory Testing:  CBC:   Recent Labs     11/05/20 2317 11/06/20 0607 11/07/20 0624   WBC 9.3 9.7 9.3   HGB 11.7* 11.2* 11.7*    133* 104*     BMP:    Recent Labs     11/05/20 2317 11/06/20 0607 11/07/20 0624 11/07/20  1614    140 140  --    K 3.5* 3.5* 3.5* 3.3*   CL 97* 98 100  --    CO2 34* 36* 31  --    BUN 10 10 10  --    CREATININE 0.74 0.63* 0.67*  --    GLUCOSE 164* 189* 233*  --          Lab Results   Component Value Date    CHOL 203 (H) 07/18/2017    LDLCHOLESTEROL 122 07/18/2017    HDL 46 07/18/2017    TRIG 174 (H) 07/18/2017    ALT 15 11/04/2020    AST 20 11/04/2020    TSH 10.40 (H) 11/06/2020    INR 1.0 07/31/2019    LABA1C 7.4 (H) 05/20/2018         Imaging/Diagnostics:      EEG: Moderate encephalopathy. No epileptiform activity. CT head: No acute process     I personally reviewed all of the above medications, clinical laboratory, imaging and other diagnostic tests. Impression:      1.  Acute metabolic encephalopathy with possible UTI, hypercapnia, Klonopin withdrawal;  Slowly improving. 2. Diffuse myoclonic jerks; resolved. Likely metabolic in nature.     Plan:      Maintain Klonopin 1 mg twice daily scheduled to avoid withdrawal   Treatment of possible UTI as per ID   BiPAP as needed   MRI brain without contrast if patient able to obtain due to his size   Discussed with nurse   We will follow        Electronically signed by Coretta Padron DO on 11/8/2020 at 10:37 AM      Coretta Padron, [address]  Neurology

## 2020-11-08 NOTE — PROGRESS NOTES
RT in to place Bipap on patient. Patient states that he is not ready to go on yet. (11/8/20 00:23)  RT in to place Bipap on patient. Pt awake and watching television and not ready to go on yet. Pt informed RT that he did not like the Bipap, but he will try it later if he has to.

## 2020-11-08 NOTE — PROGRESS NOTES
[facility]  Progress Note    Chante Copeland  MRN: [id number]  Date: 11/8/2020  LOS: 4     Reason for F/U :   UTI    Impression :   1. Toxic metabolic encephalopathy-improved  2. Urinary retention with indwelling Clemons catheter  3. Concern for catheter associated urinary tract infection  4. Diabetes mellitus type 2  5. Morbid obesity  6. Hypercapnic respiratory failure    Recommendations:   · The patient's mentation remains improved. · The urine culture has grown out E. coli and Enterococcus faecalis but again I doubt that these are pathogens. · The patient continues off systemic antimicrobial therapy for now    Infection Control Recommendations:   Universal precautions    Discharge Planning:   Estimated Length of IV antimicrobials: To be determined  Patient will need Midline Catheter Insertion/ PICC line Insertion: No  Patient will need: Home IV , [place],  SNF,  LTAC: Undetermined  Patient willneed outpatient wound care: No    Medical Decision making / Summary of Stay:   Chante Copeland is a 63y.o.-year-old male who was initially admitted on 11/4/2020. Patient has known medical history of morbid obesity, pancreatitis, urinary retention status post indwelling Clemons catheter placement, CHF, diabetes mellitus, hypertension, morbid obesity. Patient did have a urine culture at [facility] at August 2019 that was positive for Enterococcus, E. coli, Pseudomonas. He did have a urine culture at [facility] in January 2020 + for E. coli and Enterococcus.     I am not entirely clear on patient's baseline but he currently is lying in bed and unable to give me any history. Family is not at bedside.     It is my understanding that patient lives at home with family and caregivers and he is not very active, pretty much just sits in the chair, goes to bed and goes to the restroom. According to the chart, patient has been confused for about 1 week with associated hallucinations.   The confusion increased yesterday and usually when he gets like this family states he has a urinary tract infection so they brought patient to the emergency department. Patient has been started on meropenem. We were consulted to assist with antimicrobial therapy choice    Current evaluation:2020    BP (!) 169/79   Pulse 53   Temp 97.9 °F (36.6 °C) (Oral)   Resp 24   Ht 5' 11\" (1.803 m)   Wt (!) 400 lb (181.4 kg)   SpO2 (!) 83%   BMI 55.79 kg/m²     Temperature Range: Temp: 97.9 °F (36.6 °C) Temp  Av.9 °F (36.6 °C)  Min: 97.3 °F (36.3 °C)  Max: 98.4 °F (36.9 °C)  The patient is seen and evaluated at bedside and he is awake and alert able to answer some questions but not fully. Again mentation overall is improved from yesterday. Review of Systems   Unable to perform ROS: Mental status change       Physical Examination :     Physical Exam  Constitutional:       Appearance: He is well-developed. HENT:      Head: Normocephalic and atraumatic. Mouth/Throat:      Mouth: Mucous membranes are dry. Neck:      Musculoskeletal: Neck supple. Cardiovascular:      Rate and Rhythm: Normal rate. Heart sounds: Murmur present. No friction rub. No gallop. Pulmonary:      Effort: Pulmonary effort is normal.      Breath sounds: Normal breath sounds. No wheezing. Abdominal:      General: Bowel sounds are normal.      Palpations: Abdomen is soft. There is no mass. Tenderness: There is no abdominal tenderness. Lymphadenopathy:      Cervical: No cervical adenopathy. Skin:     General: Skin is warm and dry. Comments: Left posterior calf superficial ulceration   Neurological:      Mental Status: He is disoriented.          Laboratory data:   I have independently reviewed the followinglabs:  CBC with Differential:   Recent Labs     20  2317 20  0607 20  0624   WBC 9.3 9.7 9.3   HGB 11.7* 11.2* 11.7*   HCT 40.6* 38.2* 39.5*    133* 104*   LYMPHOPCT 13*  --   --    MONOPCT 10*  --   -- BMP:   Recent Labs     11/06/20  0607 11/07/20  0624 11/07/20  1614    140  --    K 3.5* 3.5* 3.3*   CL 98 100  --    CO2 36* 31  --    BUN 10 10  --    CREATININE 0.63* 0.67*  --    MG 1.6 1.7  --      Hepatic Function Panel:   No results for input(s): PROT, LABALBU, BILIDIR, IBILI, BILITOT, ALKPHOS, ALT, AST in the last 72 hours. Lab Results   Component Value Date    PROCAL 0.15 11/06/2020     Lab Results   Component Value Date    CRP 42.3 11/06/2020    CRP 74.3 08/20/2017    CRP 66.1 05/08/2016     Lab Results   Component Value Date    SEDRATE 77 (H) 11/06/2020         Lab Results   Component Value Date    DDIMER 2.49 05/04/2016     Lab Results   Component Value Date    FERRITIN 109 05/07/2016     No results found for: LDH  No results found for: FIBRINOGEN    Results in Past 30 Days  Result Component Current Result Ref Range Previous Result Ref Range   SARS-CoV-2      (11/5/2020)  Not in Time Range          (11/5/2020)       Not Detected (11/5/2020) Not Detected       Lab Results   Component Value Date    COVID19 Not Detected 11/05/2020       No results for input(s): VANCOTROUGH in the last 72 hours. Imaging Studies:   CT OF THE HEAD WITHOUT CONTRAST 11/6/2020 5:25 pm  Impression    No acute intracranial abnormality.             ONE XRAY VIEW OF THE CHEST 11/6/2020 1:55 pm   Impression    Bibasilar airspace disease suspected.  Consider formal AP and lateral chest    radiographs for further evaluation.         Moderate cardiomegaly. Cultures:     Culture, Urine [[id number]]  (Abnormal)   Collected: 11/06/20 1230    Order Status: Completed  Specimen: Urine  Updated: 11/08/20 0938     Specimen Description  . URINE     Special Requests  NOT REPORTED     Culture  ESCHERICHIA COLI >[id number] CFU/MLAbnormal        ENTEROCOCCUS FAECALIS >[id number] CFU/MLAbnormal     Escherichia coli (1)     Antibiotic  Interpretation  SHAHNAZ  Status     amikacin    Final      NOT REPORTED    ampicillin  Resistant   Final >=32   RESISTANT    ampicillin-sulbactam    Final      NOT REPORTED    aztreonam  Sensitive   Final      <=1   SUSCEPTIBLE    ceFAZolin  Sensitive   Final      <=4   SUSCEPTIBLE    ceFAZolin  Sensitive  Cefazolin sensitivity results can be used to predict the effectiveness of oral cephalosporins (eg.  Cephalexin) in uncomplicated Urinary Tract Infections due to E. coli, K. pneumoniae, and P. mirabilis  Final     cefepime    Final      NOT REPORTED    cefTRIAXone  Sensitive   Final      <=1   SUSCEPTIBLE    ciprofloxacin  Resistant   Final      >=4   RESISTANT    ertapenem    Final      NOT REPORTED    Confirmatory Extended Spectrum Beta-Lactamase  Negative  NEGATIVE  Final     gentamicin  Sensitive   Final      <=1   SUSCEPTIBLE    meropenem    Final      NOT REPORTED    nitrofurantoin  Sensitive   Final      <=16   SUSCEPTIBLE    tigecycline    Final      NOT REPORTED    tobramycin  Sensitive   Final      <=1   SUSCEPTIBLE    trimethoprim-sulfamethoxazole  Resistant   Final      >=320   RESISTANT    piperacillin-tazobactam  Sensitive   Final      <=4   SUSCEPTIBLE    Enterococcus  faecalis (2)     Antibiotic  Interpretation  SHAHNAZ  Status     ampicillin  Sensitive   Final      <=2   SUSCEPTIBLE    penicillin    Final      NOT REPORTED    ciprofloxacin  Sensitive   Final      <=0.5   SUSCEPTIBLE    erythromycin    Final      NOT REPORTED    Gentamicin, High Level   NOT REPORTED  Final     levofloxacin  Sensitive   Final      1   SUSCEPTIBLE    linezolid    Final      NOT REPORTED    nitrofurantoin  Sensitive   Final      <=16   SUSCEPTIBLE    Synercid    Final      NOT REPORTED    Streptomycin, Hi Level   NOT REPORTED  Final     tetracycline  Resistant   Final      >=16   RESISTANT    tigecycline    Final      NOT REPORTED    vancomycin  Sensitive   Final      1   SUSCEPTIBLE        Culture, Blood 1 [[id number]]   Collected: 11/04/20 1144    Order Status: Completed  Specimen: Blood  Updated: 11/08/20 0019 Subcutaneous Nightly           [facility]  Jorgito Medina  Perfect Serve messaging  OFFICE: [phone]      Electronically signed by Jorgito Medina MD on 11/8/2020 at 10:00 AM  Thank you for allowing us to participate in the care of this patient. Please call with questions. This note iscreated with the assistance of a speech recognition program.  While intending to generate a document that actually reflects the content of the visit, the document can still have some errors including those of syntax andsound a like substitutions which may escape proof reading. In such instances, actual meaning can be extrapolated by contextual diversion.

## 2020-11-08 NOTE — PLAN OF CARE
Problem: Skin Integrity:  Goal: Will show no infection signs and symptoms  Description: Will show no infection signs and symptoms  Outcome: Ongoing  Goal: Absence of new skin breakdown  Description: Absence of new skin breakdown  Outcome: Ongoing       Problem: Falls - Risk of:  Goal: Will remain free from falls  Description: Will remain free from falls  11/8/2020 0205 by Du Martinez RN  Outcome: Ongoing     Problem: Safety:  Goal: Free from accidental physical injury  Description: Free from accidental physical injury  Outcome: Ongoing

## 2020-11-08 NOTE — PROGRESS NOTES
times per day    enoxaparin  40 mg Subcutaneous BID    aspirin  81 mg Oral Daily    tamsulosin  0.4 mg Oral Daily    miconazole   Topical BID    pantoprazole  40 mg Oral QAM AC    venlafaxine  150 mg Oral Daily    bumetanide  2 mg Oral BID    bethanechol  10 mg Oral TID    DULoxetine  60 mg Oral QAM    levothyroxine  150 mcg Oral Daily    metFORMIN  500 mg Oral BID    nadolol  40 mg Oral Daily    pregabalin  150 mg Oral BID    alogliptin  25 mg Oral Daily    rifaximin  550 mg Oral BID    oxybutynin  5 mg Oral BID    naloxegol  25 mg Oral QAM    spironolactone  75 mg Oral BID    potassium chloride  20 mEq Oral Daily with breakfast    insulin glargine  80 Units Subcutaneous BID    insulin lispro  0-6 Units Subcutaneous TID WC    insulin lispro  0-3 Units Subcutaneous Nightly     Continuous Infusions:    sodium chloride 75 mL/hr at 11/08/20 0145    dextrose       PRN Meds: lidocaine, metoprolol, potassium chloride, LORazepam, sodium chloride flush, acetaminophen **OR** acetaminophen, polyethylene glycol, promethazine **OR** ondansetron, nicotine, albuterol sulfate HFA, oxyCODONE-acetaminophen, hydrOXYzine, ibuprofen, glucose, dextrose, glucagon (rDNA), dextrose, albuterol    Data:     Past Medical History:   has a past medical history of Anxiety and depression, Back pain, chronic, BPH (benign prostatic hyperplasia), CHF (congestive heart failure) ([facility] Cirrhosis ([facility] Diabetes mellitus ([facility] GERD (gastroesophageal reflux disease), H/O cardiac catheterization, Hepatitis, Hiatal hernia, History of alcohol abuse, Hyperlipidemia, Hypertension, IBS (irritable bowel syndrome), Morbid obesity ([facility] Neuropathy, On home oxygen therapy, Pancreatitis, Primary osteoarthritis of right knee, Sleep apnea, Thyroid disease, and Urinary bladder neurogenic dysfunction. Social History:   reports that he has never smoked.  He has never used smokeless tobacco. He reports that he does not drink alcohol or use drugs. Family History:   Family History   Adopted: Yes       Vitals:  BP (!) 167/68   Pulse 70   Temp 98.2 °F (36.8 °C) (Oral)   Resp 20   Ht 5' 11\" (1.803 m)   Wt (!) 400 lb (181.4 kg)   SpO2 96%   BMI 55.79 kg/m²   Temp (24hrs), Av.9 °F (36.6 °C), Min:97.3 °F (36.3 °C), Max:98.4 °F (36.9 °C)    Recent Labs     20  1223 20  1643 20  2039 20  0814   POCGLU 191* 169* 189* 177*       I/O (24Hr): Intake/Output Summary (Last 24 hours) at 2020 1132  Last data filed at 2020 1035  Gross per 24 hour   Intake 1320 ml   Output 5639 ml   Net -4319 ml       Labs:  Hematology:  Recent Labs     20   WBC 9.3 9.7 9.3   RBC 4.63 4.39 4.58   HGB 11.7* 11.2* 11.7*   HCT 40.6* 38.2* 39.5*   MCV 87.7 87.0 86.2   MCH 25.3* 25.5 25.5   MCHC 28.8* 29.3 29.6   RDW 15.1* 15.0* 14.9*    133* 104*   MPV NOT REPORTED 10.9 10.0   SEDRATE  --  77*  --    CRP  --  42.3*  --      Chemistry:  Recent Labs     20  0620  1614    140 140  --    K 3.5* 3.5* 3.5* 3.3*   CL 97* 98 100  --    CO2 34* 36* 31  --    GLUCOSE 164* 189* 233*  --    BUN 10 10 10  --    CREATININE 0.74 0.63* 0.67*  --    MG  --  1.6 1.7  --    ANIONGAP 9 6* 9  --    LABGLOM >60 >60 >60  --    GFRAA >60 >60 >60  --    CALCIUM 8.6 8.9 8.9  --      Recent Labs     20  2317 20  0607  20  1914 20  0820 20  1223 20  1643 20  2039 20  0814   TSH  --  10.40*  --   --   --   --   --   --   --    AMMONIA 37  --   --   --   --   --   --   --   --    POCGLU  --   --    < > 200* 213* 191* 169* 189* 177*    < > = values in this interval not displayed.      ABG:  Lab Results   Component Value Date    POCPH 7.42 2020    PHART 7.330 2014    POCPCO2 54 2020    VXF8VZI 68.0 2014    POCPO2 69 2020    PO2ART 84.0 2014    POCHCO3 34.9 2020    IQA0WUO 34.9 2014 NBEA NOT REPORTED 11/06/2020    PBEA 10 11/06/2020    FWV2NTG 37 11/06/2020    LTXR2EGB 93 11/06/2020    K5KSEKKC 96.5 06/18/2014    FIO2 30.0 11/06/2020     Lab Results   Component Value Date/Time    SPECIAL NOT REPORTED 11/06/2020 12:30 PM     Lab Results   Component Value Date/Time    CULTURE ESCHERICHIA COLI >[id number] CFU/ML (A) 11/06/2020 12:30 PM    CULTURE ENTEROCOCCUS FAECALIS >[id number] CFU/ML (A) 11/06/2020 12:30 PM       Radiology:  Ct Head Wo Contrast    Result Date: 11/4/2020  No acute intracranial abnormality. Xr Chest Portable    Result Date: 11/4/2020  Small right lung base opacity is nonspecific but likely atelectasis and a pleural effusion. Pneumonia is not excluded but felt to be less likely.        Physical Examination:        General appearance:  alert, no distress  Mental Status:  Altered, incomprehensible speech   Lungs:  clear to auscultation bilaterally, normal effort  Heart:  regular rate and rhythm  Abdomen:  soft, nontender, nondistended, normal bowel sounds  Extremities:  no edema, redness, tenderness in the calves  Skin:  no gross lesions, rashes, induration    Assessment:        Hospital Problems           Last Modified POA    * (Principal) Urinary tract infection associated with indwelling urethral catheter ([address] 97/9/2352 Yes    Alcoholic cirrhosis of liver ([address] 11/4/2020 Yes    Bipolar disorder ([address] 11/4/2020 Yes    Type 2 diabetes mellitus with diabetic neuropathy, with long-term current use of insulin ([address] 11/4/2020 Yes    Hyperlipidemia 87/0/7492 Yes    Metabolic encephalopathy 31/4/4592 Yes    FABI (obstructive sleep apnea) 11/4/2020 Yes    Hypothyroidism 11/4/2020 Yes    Urine retention 11/4/2020 Yes    Chronic indwelling Clemons catheter (Chronic) 11/4/2020 Yes    Back pain, chronic 11/4/2020 Yes    Chronic diastolic congestive heart failure ([address] 11/4/2020 Yes    GERD (gastroesophageal reflux disease) 11/4/2020 Yes    Morbid obesity ([address] 11/4/2020 Yes    Delirium due to another medical condition 11/4/2020 Yes    Musculoskeletal immobility 11/4/2020 Yes    Pyocystis 11/7/2020 Yes    Myoclonic jerking 11/7/2020 Yes          Plan:        - Vitals, labs, imaging, medications reviewed  - Urology following - case discussed with Dr Jeffrey Jenkins - plan bladder irrigation with neosporin  - ID following - hold further IV antibiotics  - Neurology following - maintain klonopin   - Monitor mental status - slight improvement  - Follow up repeat culture - likely colonization per ID  - Neurological workup in progress  - Bladder irrigation in progress  - Monitor mental status - possible psychiatric component?        Kirstie Hernandez MD  11/8/2020  11:32 AM

## 2020-11-09 ENCOUNTER — APPOINTMENT (OUTPATIENT)
Dept: MRI IMAGING | Age: 55
DRG: 689 | End: 2020-11-09
Payer: MEDICARE

## 2020-11-09 LAB
GLUCOSE BLD-MCNC: 121 MG/DL (ref 75–110)
GLUCOSE BLD-MCNC: 128 MG/DL (ref 75–110)
GLUCOSE BLD-MCNC: 153 MG/DL (ref 75–110)
GLUCOSE BLD-MCNC: 96 MG/DL (ref 75–110)

## 2020-11-09 PROCEDURE — 94761 N-INVAS EAR/PLS OXIMETRY MLT: CPT

## 2020-11-09 PROCEDURE — 82947 ASSAY GLUCOSE BLOOD QUANT: CPT

## 2020-11-09 PROCEDURE — 2060000000 HC ICU INTERMEDIATE R&B

## 2020-11-09 PROCEDURE — 2700000000 HC OXYGEN THERAPY PER DAY

## 2020-11-09 PROCEDURE — 99232 SBSQ HOSP IP/OBS MODERATE 35: CPT | Performed by: INTERNAL MEDICINE

## 2020-11-09 PROCEDURE — 6370000000 HC RX 637 (ALT 250 FOR IP): Performed by: PSYCHIATRY & NEUROLOGY

## 2020-11-09 PROCEDURE — 70551 MRI BRAIN STEM W/O DYE: CPT

## 2020-11-09 PROCEDURE — 99233 SBSQ HOSP IP/OBS HIGH 50: CPT | Performed by: PSYCHIATRY & NEUROLOGY

## 2020-11-09 PROCEDURE — 6370000000 HC RX 637 (ALT 250 FOR IP): Performed by: NURSE PRACTITIONER

## 2020-11-09 PROCEDURE — 6360000002 HC RX W HCPCS: Performed by: NURSE PRACTITIONER

## 2020-11-09 RX ADMIN — CLONAZEPAM 1 MG: 0.5 TABLET ORAL at 07:58

## 2020-11-09 RX ADMIN — METFORMIN HYDROCHLORIDE 500 MG: 500 TABLET ORAL at 17:00

## 2020-11-09 RX ADMIN — BUMETANIDE 2 MG: 1 TABLET ORAL at 17:00

## 2020-11-09 RX ADMIN — SPIRONOLACTONE 75 MG: 25 TABLET ORAL at 20:16

## 2020-11-09 RX ADMIN — ANTI-FUNGAL POWDER MICONAZOLE NITRATE TALC FREE: 1.42 POWDER TOPICAL at 08:01

## 2020-11-09 RX ADMIN — VENLAFAXINE HYDROCHLORIDE 150 MG: 75 CAPSULE, EXTENDED RELEASE ORAL at 07:59

## 2020-11-09 RX ADMIN — ALOGLIPTIN 25 MG: 25 TABLET, FILM COATED ORAL at 13:10

## 2020-11-09 RX ADMIN — LORAZEPAM 0.5 MG: 2 INJECTION INTRAMUSCULAR; INTRAVENOUS at 08:56

## 2020-11-09 RX ADMIN — ENOXAPARIN SODIUM 40 MG: 40 INJECTION SUBCUTANEOUS at 07:59

## 2020-11-09 RX ADMIN — INSULIN LISPRO 1 UNITS: 100 INJECTION, SOLUTION INTRAVENOUS; SUBCUTANEOUS at 12:20

## 2020-11-09 RX ADMIN — CLONAZEPAM 1 MG: 0.5 TABLET ORAL at 20:15

## 2020-11-09 RX ADMIN — TAMSULOSIN HYDROCHLORIDE 0.4 MG: 0.4 CAPSULE ORAL at 07:59

## 2020-11-09 RX ADMIN — ASPIRIN 81 MG: 81 TABLET, COATED ORAL at 07:58

## 2020-11-09 RX ADMIN — OXYCODONE HYDROCHLORIDE AND ACETAMINOPHEN 1.5 TABLET: 5; 325 TABLET ORAL at 13:10

## 2020-11-09 RX ADMIN — OXYBUTYNIN CHLORIDE 5 MG: 5 TABLET ORAL at 20:16

## 2020-11-09 RX ADMIN — LEVOTHYROXINE SODIUM 150 MCG: 0.15 TABLET ORAL at 05:50

## 2020-11-09 RX ADMIN — ENOXAPARIN SODIUM 40 MG: 40 INJECTION SUBCUTANEOUS at 20:16

## 2020-11-09 RX ADMIN — ANTI-FUNGAL POWDER MICONAZOLE NITRATE TALC FREE: 1.42 POWDER TOPICAL at 20:25

## 2020-11-09 RX ADMIN — ONDANSETRON 4 MG: 2 INJECTION INTRAMUSCULAR; INTRAVENOUS at 20:40

## 2020-11-09 RX ADMIN — OXYCODONE HYDROCHLORIDE AND ACETAMINOPHEN 1.5 TABLET: 5; 325 TABLET ORAL at 22:07

## 2020-11-09 RX ADMIN — PANTOPRAZOLE SODIUM 40 MG: 40 TABLET, DELAYED RELEASE ORAL at 05:50

## 2020-11-09 RX ADMIN — POTASSIUM CHLORIDE 20 MEQ: 20 TABLET, EXTENDED RELEASE ORAL at 07:58

## 2020-11-09 RX ADMIN — SPIRONOLACTONE 75 MG: 25 TABLET ORAL at 07:59

## 2020-11-09 RX ADMIN — BUMETANIDE 2 MG: 1 TABLET ORAL at 07:58

## 2020-11-09 RX ADMIN — RIFAXIMIN 550 MG: 550 TABLET ORAL at 07:59

## 2020-11-09 RX ADMIN — NADOLOL 40 MG: 20 TABLET ORAL at 08:00

## 2020-11-09 RX ADMIN — PREGABALIN 150 MG: 75 CAPSULE ORAL at 20:15

## 2020-11-09 RX ADMIN — METFORMIN HYDROCHLORIDE 500 MG: 500 TABLET ORAL at 07:58

## 2020-11-09 RX ADMIN — OXYBUTYNIN CHLORIDE 5 MG: 5 TABLET ORAL at 07:58

## 2020-11-09 RX ADMIN — PREGABALIN 150 MG: 75 CAPSULE ORAL at 07:58

## 2020-11-09 RX ADMIN — DULOXETINE HYDROCHLORIDE 60 MG: 60 CAPSULE, DELAYED RELEASE ORAL at 07:59

## 2020-11-09 RX ADMIN — RIFAXIMIN 550 MG: 550 TABLET ORAL at 20:15

## 2020-11-09 ASSESSMENT — PAIN DESCRIPTION - PAIN TYPE
TYPE: ACUTE PAIN
TYPE: ACUTE PAIN

## 2020-11-09 ASSESSMENT — PAIN SCALES - WONG BAKER

## 2020-11-09 ASSESSMENT — PAIN SCALES - GENERAL
PAINLEVEL_OUTOF10: 5
PAINLEVEL_OUTOF10: 7
PAINLEVEL_OUTOF10: 6
PAINLEVEL_OUTOF10: 7
PAINLEVEL_OUTOF10: 7
PAINLEVEL_OUTOF10: 6

## 2020-11-09 ASSESSMENT — PAIN DESCRIPTION - DESCRIPTORS
DESCRIPTORS: ACHING
DESCRIPTORS: ACHING

## 2020-11-09 ASSESSMENT — ENCOUNTER SYMPTOMS
GASTROINTESTINAL NEGATIVE: 1
RESPIRATORY NEGATIVE: 1

## 2020-11-09 ASSESSMENT — PAIN DESCRIPTION - LOCATION
LOCATION: GENERALIZED
LOCATION: GENERALIZED

## 2020-11-09 ASSESSMENT — PAIN DESCRIPTION - FREQUENCY: FREQUENCY: INTERMITTENT

## 2020-11-09 NOTE — PROGRESS NOTES
[facility]   Urology Progress Note            Subjective: Follow-up UTI pyocystis    Patient Vitals for the past 24 hrs:   BP Temp Temp src Pulse Resp SpO2   11/09/20 1529 (!) 132/53 97.9 °F (36.6 °C) Oral 54 18 94 %   11/09/20 1239 (!) 110/44 98.6 °F (37 °C) Oral 59 20 97 %   11/09/20 1013 -- -- -- -- -- 98 %   11/09/20 0759 (!) 128/53 98.2 °F (36.8 °C) Axillary 63 18 92 %   11/09/20 0256 (!) 106/49 99 °F (37.2 °C) Oral 60 20 94 %   11/09/20 0020 (!) 137/45 98.8 °F (37.1 °C) Oral 59 18 98 %   11/08/20 2023 (!) 132/41 99 °F (37.2 °C) Axillary 61 18 97 %       Intake/Output Summary (Last 24 hours) at 11/9/2020 1616  Last data filed at 11/9/2020 1245  Gross per 24 hour   Intake 1200 ml   Output 2600 ml   Net -1400 ml       Recent Labs     11/07/20  0624   WBC 9.3   HGB 11.7*   HCT 39.5*   MCV 86.2   *     Recent Labs     11/07/20  0624 11/07/20  1614     --    K 3.5* 3.3*     --    CO2 31  --    BUN 10  --    CREATININE 0.67*  --        No results for input(s): COLORU, PHUR, LABCAST, WBCUA, RBCUA, MUCUS, TRICHOMONAS, YEAST, BACTERIA, CLARITYU, SPECGRAV, LEUKOCYTESUR, UROBILINOGEN, BILIRUBINUR, BLOODU in the last 72 hours.     Invalid input(s): NITRATE, GLUCOSEUKETONESUAMORPHOUS    Additional Lab/culture results:    Physical Exam: Patient is more alert, he was able to carry a conversation with me we discussed the Clemons catheter and we discussed catheter exchange in 2 weeks he voiced understanding    Previous catheter changes were happening at home with home health care    Interval Imaging Findings:    Impression:    Patient Active Problem List   Diagnosis    [facility]    Peripheral edema    Bipolar disorder ([address]    Type 2 diabetes mellitus with diabetic neuropathy, with long-term current use of insulin [facility]    Essential hypertension    Hyperlipidemia    Weakness    Hyponatremia    Metabolic encephalopathy    FABI (obstructive

## 2020-11-09 NOTE — PROGRESS NOTES
Occupational Therapy   [facility]  Occupational Therapy Not Seen Note    Patient not available for Occupational Therapy due to:    [] Testing:    [] Hemodialysis    [] Cancelled by RN:    []Refusal by Patient:    [] Surgery:     [] Intubation:     [] Pain Medication:    [] Sedation:     [] Spine Precautions :    [] Medical Instability:    [x] Other: pt at MRI    Johanny Mcgrath OTR/L

## 2020-11-09 NOTE — CARE COORDINATION
Social work; spoke to [facility] today they are still reviewing for possible admission they only have one bed and their beds only go up to 350 lbs. They can order a giovanni bed if they can get approval to consider this pt from their . Will need jennifer and Rx at discharge. Will check hens.   Viviane carlisle

## 2020-11-09 NOTE — PROGRESS NOTES
[facility]  Progress Note    Deneen Liriano  MRN: [id number]  Date: 11/9/2020  LOS: 5     Reason for F/U :   UTI    Impression :   1. Toxic metabolic encephalopathy-improved  2. Urinary retention with indwelling Clemons catheter  3. Concern for catheter associated urinary tract infection  4. Diabetes mellitus type 2  5. Morbid obesity  6. Hypercapnic respiratory failure    Recommendations:   · Bacteriuria with E. coli and Enterococcus faecalis  · The patient continues off systemic antimicrobial therapy for now  · His mentation continues to improve and he is more awake and alert and appropriate today. · Again the encephalopathy was likely toxic metabolic related to CO2 retention. · Infectious disease wise I would recommend that he continue off antimicrobial therapy. · Continue compression therapy for the lower extremity edema. · Continue local wound care for the left posterior leg ulceration  · Infectious disease wise is nothing further to add and I will sign off    Infection Control Recommendations:   Universal precautions    Discharge Planning:   Estimated Length of IV antimicrobials: To be determined  Patient will need Midline Catheter Insertion/ PICC line Insertion: No  Patient will need: Home IV , [place],  SNF,  LTAC: Undetermined  Patient willneed outpatient wound care: No    Medical Decision making / Summary of Stay:   Deneen Liriano is a 63y.o.-year-old male who was initially admitted on 11/4/2020. Patient has known medical history of morbid obesity, pancreatitis, urinary retention status post indwelling Clemons catheter placement, CHF, diabetes mellitus, hypertension, morbid obesity. Patient did have a urine culture at [facility] at August 2019 that was positive for Enterococcus, E. coli, Pseudomonas.   He did have a urine culture at [facility] in January 2020 + for E. coli and Enterococcus.     I am not entirely clear on patient's baseline but he currently is lying in bed and unable to give me any history. Family is not at bedside.     It is my understanding that patient lives at home with family and caregivers and he is not very active, pretty much just sits in the chair, goes to bed and goes to the restroom. According to the chart, patient has been confused for about 1 week with associated hallucinations. The confusion increased yesterday and usually when he gets like this family states he has a urinary tract infection so they brought patient to the emergency department. Patient has been started on meropenem. We were consulted to assist with antimicrobial therapy choice    Current evaluation:2020    BP (!) 128/53   Pulse 63   Temp 98.2 °F (36.8 °C) (Axillary)   Resp 18   Ht 5' 11\" (1.803 m)   Wt (!) 400 lb (181.4 kg)   SpO2 98%   BMI 55.79 kg/m²     Temperature Range: Temp: 98.2 °F (36.8 °C) Temp  Av.5 °F (36.9 °C)  Min: 97.7 °F (36.5 °C)  Max: 99 °F (37.2 °C)  The patient is seen and evaluated at bedside he is awake and alert in no acute distress. He is even more appropriate today answering questions appropriately. He reports that he wants to take his medications and eat. He does not report any subjective fevers or chills. Review of Systems   Constitutional: Negative. Respiratory: Negative. Cardiovascular: Negative. Gastrointestinal: Negative. Genitourinary: Negative. Musculoskeletal: Negative. Neurological: Negative. Physical Examination :     Physical Exam  Constitutional:       Appearance: He is well-developed. HENT:      Head: Normocephalic and atraumatic. Mouth/Throat:      Mouth: Mucous membranes are dry. Neck:      Musculoskeletal: Neck supple. Cardiovascular:      Rate and Rhythm: Normal rate. Heart sounds: Murmur present. No friction rub. No gallop. Pulmonary:      Effort: Pulmonary effort is normal.      Breath sounds: Normal breath sounds. No wheezing.    Abdominal:      General: Bowel sounds are normal. Palpations: Abdomen is soft. There is no mass. Tenderness: There is no abdominal tenderness. Lymphadenopathy:      Cervical: No cervical adenopathy. Skin:     General: Skin is warm and dry. Comments: Left posterior calf superficial ulceration   Neurological:      Mental Status: He is disoriented. Laboratory data:   I have independently reviewed the followinglabs:  CBC with Differential:   Recent Labs     11/07/20 0624   WBC 9.3   HGB 11.7*   HCT 39.5*   *     BMP:   Recent Labs     11/07/20  0624 11/07/20  1614     --    K 3.5* 3.3*     --    CO2 31  --    BUN 10  --    CREATININE 0.67*  --    MG 1.7  --      Hepatic Function Panel:   No results for input(s): PROT, LABALBU, BILIDIR, IBILI, BILITOT, ALKPHOS, ALT, AST in the last 72 hours. Lab Results   Component Value Date    PROCAL 0.15 11/06/2020     Lab Results   Component Value Date    CRP 42.3 11/06/2020    CRP 74.3 08/20/2017    CRP 66.1 05/08/2016     Lab Results   Component Value Date    SEDRATE 77 (H) 11/06/2020         Lab Results   Component Value Date    DDIMER 2.49 05/04/2016     Lab Results   Component Value Date    FERRITIN 109 05/07/2016     No results found for: LDH  No results found for: FIBRINOGEN    Results in Past 30 Days  Result Component Current Result Ref Range Previous Result Ref Range   SARS-CoV-2      (11/5/2020)  Not in Time Range          (11/5/2020)       Not Detected (11/5/2020) Not Detected       Lab Results   Component Value Date    COVID19 Not Detected 11/05/2020       No results for input(s): VANCOTROUGH in the last 72 hours. Imaging Studies:   CT OF THE HEAD WITHOUT CONTRAST 11/6/2020 5:25 pm  Impression    No acute intracranial abnormality.             ONE XRAY VIEW OF THE CHEST 11/6/2020 1:55 pm   Impression    Bibasilar airspace disease suspected.  Consider formal AP and lateral chest    radiographs for further evaluation.         Moderate cardiomegaly.         Cultures: Culture, Urine [[id number]]  (Abnormal)   Collected: 11/06/20 1230    Order Status: Completed  Specimen: Urine  Updated: 11/08/20 0956     Specimen Description  . URINE     Special Requests  NOT REPORTED     Culture  ESCHERICHIA COLI >[id number] CFU/MLAbnormal        ENTEROCOCCUS FAECALIS >[id number] CFU/MLAbnormal     Escherichia coli (1)     Antibiotic  Interpretation  SHAHNAZ  Status     amikacin    Final      NOT REPORTED    ampicillin  Resistant   Final      >=32   RESISTANT    ampicillin-sulbactam    Final      NOT REPORTED    aztreonam  Sensitive   Final      <=1   SUSCEPTIBLE    ceFAZolin  Sensitive   Final      <=4   SUSCEPTIBLE    ceFAZolin  Sensitive  Cefazolin sensitivity results can be used to predict the effectiveness of oral cephalosporins (eg.  Cephalexin) in uncomplicated Urinary Tract Infections due to E. coli, K. pneumoniae, and P. mirabilis  Final     cefepime    Final      NOT REPORTED    cefTRIAXone  Sensitive   Final      <=1   SUSCEPTIBLE    ciprofloxacin  Resistant   Final      >=4   RESISTANT    ertapenem    Final      NOT REPORTED    Confirmatory Extended Spectrum Beta-Lactamase  Negative  NEGATIVE  Final     gentamicin  Sensitive   Final      <=1   SUSCEPTIBLE    meropenem    Final      NOT REPORTED    nitrofurantoin  Sensitive   Final      <=16   SUSCEPTIBLE    tigecycline    Final      NOT REPORTED    tobramycin  Sensitive   Final      <=1   SUSCEPTIBLE    trimethoprim-sulfamethoxazole  Resistant   Final      >=320   RESISTANT    piperacillin-tazobactam  Sensitive   Final      <=4   SUSCEPTIBLE    Enterococcus  faecalis (2)     Antibiotic  Interpretation  SHAHNAZ  Status     ampicillin  Sensitive   Final      <=2   SUSCEPTIBLE    penicillin    Final      NOT REPORTED    ciprofloxacin  Sensitive   Final      <=0.5   SUSCEPTIBLE    erythromycin    Final      NOT REPORTED    Gentamicin, High Level   NOT REPORTED  Final     levofloxacin  Sensitive   Final      1   SUSCEPTIBLE    linezolid    Final Oral Daily    metFORMIN  500 mg Oral BID    nadolol  40 mg Oral Daily    pregabalin  150 mg Oral BID    alogliptin  25 mg Oral Daily    rifaximin  550 mg Oral BID    oxybutynin  5 mg Oral BID    naloxegol  25 mg Oral QAM    spironolactone  75 mg Oral BID    potassium chloride  20 mEq Oral Daily with breakfast    insulin glargine  80 Units Subcutaneous BID    insulin lispro  0-6 Units Subcutaneous TID WC    insulin lispro  0-3 Units Subcutaneous Nightly           [facility]  [address]  [employer] messaging  OFFICE: [phone]      Electronically signed by [address] [place], [place] [address], [place] on 11/9/2020 at 10:47 AM  Thank you for allowing us to participate in the care of this patient. Please call with questions. This note iscreated with the assistance of a speech recognition program.  While intending to generate a document that actually reflects the content of the visit, the document can still have some errors including those of syntax andsound a like substitutions which may escape proof reading. In such instances, actual meaning can be extrapolated by contextual diversion.

## 2020-11-09 NOTE — PROGRESS NOTES
Allergen Reactions    No Known Allergies        Current Meds:   Scheduled Meds:    clonazePAM  1 mg Oral BID    sodium chloride flush  10 mL Intravenous 2 times per day    enoxaparin  40 mg Subcutaneous BID    aspirin  81 mg Oral Daily    tamsulosin  0.4 mg Oral Daily    miconazole   Topical BID    pantoprazole  40 mg Oral QAM AC    venlafaxine  150 mg Oral Daily    bumetanide  2 mg Oral BID    bethanechol  10 mg Oral TID    DULoxetine  60 mg Oral QAM    levothyroxine  150 mcg Oral Daily    metFORMIN  500 mg Oral BID    nadolol  40 mg Oral Daily    pregabalin  150 mg Oral BID    alogliptin  25 mg Oral Daily    rifaximin  550 mg Oral BID    oxybutynin  5 mg Oral BID    naloxegol  25 mg Oral QAM    spironolactone  75 mg Oral BID    potassium chloride  20 mEq Oral Daily with breakfast    insulin glargine  80 Units Subcutaneous BID    insulin lispro  0-6 Units Subcutaneous TID WC    insulin lispro  0-3 Units Subcutaneous Nightly     Continuous Infusions:    sodium chloride Stopped (11/09/20 1022)    dextrose       PRN Meds: lidocaine, metoprolol, potassium chloride, LORazepam, sodium chloride flush, acetaminophen **OR** acetaminophen, polyethylene glycol, promethazine **OR** ondansetron, nicotine, albuterol sulfate HFA, oxyCODONE-acetaminophen, hydrOXYzine, ibuprofen, glucose, dextrose, glucagon (rDNA), dextrose, albuterol    Data:     Past Medical History:   has a past medical history of Anxiety and depression, Back pain, chronic, BPH (benign prostatic hyperplasia), CHF (congestive heart failure) ([facility] Cirrhosis ([facility] Diabetes mellitus ([facility] GERD (gastroesophageal reflux disease), H/O cardiac catheterization, Hepatitis, Hiatal hernia, History of alcohol abuse, Hyperlipidemia, Hypertension, IBS (irritable bowel syndrome), Morbid obesity ([facility] Neuropathy, On home oxygen therapy, Pancreatitis, Primary osteoarthritis of right knee, Sleep apnea, Thyroid disease, and Urinary bladder 12:30 PM    CULTURE ENTEROCOCCUS FAECALIS >[id number] CFU/ML (A) 11/06/2020 12:30 PM       Radiology:  Ct Head Wo Contrast    Result Date: 11/4/2020  No acute intracranial abnormality. Xr Chest Portable    Result Date: 11/4/2020  Small right lung base opacity is nonspecific but likely atelectasis and a pleural effusion. Pneumonia is not excluded but felt to be less likely.        Physical Examination:        General appearance:  alert, no distress  Mental Status:  Alert, responding intermittently   Lungs:  clear to auscultation bilaterally, normal effort  Heart:  regular rate and rhythm  Abdomen:  soft, nontender, nondistended, normal bowel sounds  Extremities:  no edema, redness, tenderness in the calves  Skin:  no gross lesions, rashes, induration    Assessment:        Hospital Problems           Last Modified POA    * (Principal) Urinary tract infection associated with indwelling urethral catheter ([facility] 75/1/7561 Yes    Alcoholic cirrhosis of liver ([facility] 11/4/2020 Yes    Bipolar disorder ([facility] 11/4/2020 Yes    Type 2 diabetes mellitus with diabetic neuropathy, with long-term current use of insulin ([address] 11/4/2020 Yes    Hyperlipidemia 50/5/6763 Yes    Metabolic encephalopathy 1964 Yes    FABI (obstructive sleep apnea) 11/4/2020 Yes    Hypothyroidism 11/4/2020 Yes    Urine retention 11/4/2020 Yes    Chronic indwelling Clemons catheter (Chronic) 11/4/2020 Yes    Back pain, chronic 11/4/2020 Yes    Chronic diastolic congestive heart failure ([address] 11/4/2020 Yes    GERD (gastroesophageal reflux disease) 11/4/2020 Yes    Morbid obesity ([address] 11/4/2020 Yes    Delirium due to another medical condition 11/4/2020 Yes    Musculoskeletal immobility 11/4/2020 Yes    Pyocystis 11/7/2020 Yes    Myoclonic jerking 11/7/2020 Yes          Plan:        - Vitals, labs, imaging, medications reviewed  - Urology following - case discussed with Dr Alexandre Joseph - plan bladder irrigation with neosporin  - ID following - hold further IV antibiotics  - Neurology following - maintain klonopin - follow up MRI  - Monitor mental status - slight improvement  - Follow up repeat culture - likely colonization per ID  - Neurological workup in progress  - Bladder irrigation in progress  - Monitor mental status - possible psychiatric component?  - PT/OT, working on SNF    Cristy Collins MD  11/9/2020  11:12 AM

## 2020-11-09 NOTE — PLAN OF CARE
Problem: Skin Integrity:  Goal: Will show no infection signs and symptoms  Description: Will show no infection signs and symptoms  11/8/2020 2253 by Keshav Cisneros  Outcome: Ongoing  11/8/2020 1514 by Yandel Anderson RN  Outcome: Ongoing   Monitor for signs of sepsis.

## 2020-11-09 NOTE — PROGRESS NOTES
4 [facility], NEUROLOGY                    NEUROLOGY PROGRESS NOTE    PATIENT NAME: Fabián Lux   PATIENT MRN: [id number]   PRIMARY CARE PHYSICIAN: Kyra Puentes MD  DATE OF SERVICE: 11/9/2020    CHIEF COMPLAINT: Altered Mental Status         SUBJECTIVE:    Pt is still confused but is able to tell me he is in a hospital, and the hospital name is [address] [place] [address] but could not tell me the year, month, president. Per nursing staff, pt's mental status is slowing improving. Brief HPI  Mine White is a 63year old male was admitted on 11/4 for confusion. Neurology was consulted on 11/6/2020 because on 11/5 night, he became more lethargic, even put on BiPAP, next morning remained lethargic, not follow commands. He also had diffuse myoclonic jerks There were other suspected causes like klonopin withdrawal.     Pt has chronic indwelling Clemons, and UTI, he has been on antibiotics on this admission, Ux grew E coli,  Enterococcus faecalis, first he was IV meropenem, then Cipro IV,  Abx was discontinued 2 days ago per ID. MRI was ordered, which is done today, unremarkable. MRI brain 11/9/2020   Unremarkable     Ux 11/6: Echo >[id number] CFU/ml  Enterococcus faecalis > 615051 CFU/ml     PAST MEDICAL HISTORY:         Diagnosis Date    Anxiety and depression     Back pain, chronic     BPH (benign prostatic hyperplasia)     CHF (congestive heart failure) ([address]     Cirrhosis ([address]     Diabetes mellitus [facility]     not checking bloodsugar at home    GERD (gastroesophageal reflux disease)     H/O cardiac catheterization not sure of date    no stents    Hepatitis     Pt does not know the type.      Hiatal hernia     History of alcohol abuse     Sober since 2013    Hyperlipidemia     not taking any medication    Hypertension     IBS (irritable bowel syndrome)     Morbid obesity (HCC)     Neuropathy     feet,toes    On home oxygen therapy     prn  Pancreatitis     x 5 episodes    Primary osteoarthritis of right knee     Sleep apnea     No machine    Thyroid disease     Urinary bladder neurogenic dysfunction        MEDICATIONS:     Current Facility-Administered Medications   Medication Dose Route Frequency Provider Last Rate Last Dose    clonazePAM (KLONOPIN) tablet 1 mg  1 mg Oral BID Tay Mcginnis DO   1 mg at 11/09/20 0758    lidocaine (XYLOCAINE) 2 % uro-jet   Topical PRN Warren Garsia MD        metoprolol (LOPRESSOR) injection 5 mg  5 mg Intravenous Q6H PRN Teddy Fury, APRN - NP   5 mg at 11/06/20 2350    potassium chloride 10 mEq/100 mL IVPB (Peripheral Line)  10 mEq Intravenous PRN Teddy Fury, APRN -  mL/hr at 11/07/20 2204 10 mEq at 11/07/20 2204    0.9 % sodium chloride infusion   Intravenous Continuous Carisa Bess, APRN - NP   Stopped at 11/09/20 1022    LORazepam (ATIVAN) injection 0.5 mg  0.5 mg Intravenous Q6H PRN Carisa Bess, APRN - NP   0.5 mg at 11/09/20 0856    sodium chloride flush 0.9 % injection 10 mL  10 mL Intravenous 2 times per day Teddy Fury, APRN - NP        sodium chloride flush 0.9 % injection 10 mL  10 mL Intravenous PRN Carisa Bess, APRN - NP        acetaminophen (TYLENOL) tablet 650 mg  650 mg Oral Q6H PRN Teddy Fury, APRN - NP   650 mg at 11/08/20 3704    Or    acetaminophen (TYLENOL) suppository 650 mg  650 mg Rectal Q6H PRN Carisa Bess, APRN - NP        polyethylene glycol (GLYCOLAX) packet 17 g  17 g Oral Daily PRN Carisa Bess, APRN - NP        promethazine (PHENERGAN) tablet 12.5 mg  12.5 mg Oral Q6H PRN Carisa Bess, APRN - NP        Or    ondansetron (ZOFRAN) injection 4 mg  4 mg Intravenous Q6H PRN Carisa Bess, APRN - NP   4 mg at 11/08/20 1138    nicotine (NICODERM CQ) 21 MG/24HR 1 patch  1 patch Transdermal Daily PRN Carisa Bess, APRN - NP        enoxaparin (LOVENOX) injection 40 mg  40 mg Subcutaneous BID Teddy Fury, APRN - NP   40 mg at 11/09/20 0759    albuterol sulfate  (90 Base) MCG/ACT inhaler 2 puff  2 puff Inhalation Q6H PRN Amber Swearingen, APRN - NP        aspirin EC tablet 81 mg  81 mg Oral Daily Sandralee Olds, APRN - NP   81 mg at 11/09/20 0758    tamsulosin (FLOMAX) capsule 0.4 mg  0.4 mg Oral Daily Amber Swearingen, APRN - NP   0.4 mg at 11/09/20 0759    miconazole (MICOTIN) 2 % powder   Topical BID Amber Swearingen, APRN - NP        pantoprazole (PROTONIX) tablet 40 mg  40 mg Oral QAM AC Amber Swearingen, APRN - NP   40 mg at 11/09/20 0550    venlafaxine (EFFEXOR XR) extended release capsule 150 mg  150 mg Oral Daily Sandralee Olds, APRN - NP   150 mg at 11/09/20 0759    bumetanide (BUMEX) tablet 2 mg  2 mg Oral BID Sandralee Olds, APRN - NP   2 mg at 11/09/20 0758    bethanechol (URECHOLINE) tablet 10 mg  10 mg Oral TID Sandralee Olds, APRN - NP   Stopped at 11/05/20 0924    DULoxetine (CYMBALTA) extended release capsule 60 mg  60 mg Oral QAM Amber Swearingen, APRN - NP   60 mg at 11/09/20 0759    levothyroxine (SYNTHROID) tablet 150 mcg  150 mcg Oral Daily Sandralee Olds, APRN - NP   150 mcg at 11/09/20 0550    metFORMIN (GLUCOPHAGE) tablet 500 mg  500 mg Oral BID Sandralee Olds, APRN - NP   500 mg at 11/09/20 0758    nadolol (CORGARD) tablet 40 mg  40 mg Oral Daily Amber Swearingen, APRN - NP   40 mg at 11/09/20 0800    pregabalin (LYRICA) capsule 150 mg  150 mg Oral BID Sandralee Olds, APRN - NP   150 mg at 11/09/20 0758    oxyCODONE-acetaminophen (PERCOCET) 5-325 MG per tablet 1.5 tablet  1.5 tablet Oral Q6H PRN Sandralee Olds, APRN - NP   1.5 tablet at 11/09/20 1310    alogliptin (NESINA) tablet 25 mg  25 mg Oral Daily Carisa Kellogg APRN - NP   25 mg at 11/09/20 1310    rifaximin (XIFAXAN) tablet 550 mg  550 mg Oral BID Bimal Ibanez APRN - NP   550 mg at 11/09/20 0759    oxybutynin (DITROPAN) tablet 5 mg  5 mg Oral BID Carisa Kellogg, APRN - NP   5 mg at 11/09/20 0758    naloxegol (MOVANTIK) tablet 25 mg  25 mg Oral QAM Dawood Davis APRN - NP   Stopped at 11/05/20 0534    hydrOXYzine (ATARAX) tablet 25 mg  25 mg Oral TID PRN Dawood Davis APRN - NP   25 mg at 11/08/20 1120    ibuprofen (ADVIL;MOTRIN) tablet 600 mg  600 mg Oral Q6H PRN Dawood Davis APRN - NP   600 mg at 11/08/20 0010    spironolactone (ALDACTONE) tablet 75 mg  75 mg Oral BID Carisa BessCARINA cesarN - NP   75 mg at 11/09/20 0759    potassium chloride (KLOR-CON M) extended release tablet 20 mEq  20 mEq Oral Daily with breakfast Dawood Davis APRN - NP   20 mEq at 11/09/20 0758    insulin glargine (LANTUS) injection vial 80 Units  80 Units Subcutaneous BID Carisa Kellogg APRN - NP   Stopped at 11/05/20 0925    glucose (GLUTOSE) 40 % oral gel 15 g  15 g Oral PRN Carisa Kellogg APRN - NP        dextrose 50 % IV solution  12.5 g Intravenous PRN Carisa Kellogg APRN - NP        glucagon (rDNA) injection 1 mg  1 mg Intramuscular PRN Carisa Kellogg APRN - NP        dextrose 5 % solution  100 mL/hr Intravenous PRN Carisa Kellogg APRN - NP        insulin lispro (HUMALOG) injection vial 0-6 Units  0-6 Units Subcutaneous TID WC Carisa Kellogg APRN - NP   1 Units at 11/09/20 1220    insulin lispro (HUMALOG) injection vial 0-3 Units  0-3 Units Subcutaneous Nightly Dawood Davis APRN - NP   1 Units at 11/08/20 2111    albuterol (PROVENTIL) nebulizer solution 2.5 mg  2.5 mg Nebulization As Directed RT PRN SUNSHINE Quinonez CNP            ALLERGIES:     Allergies   Allergen Reactions    No Known Allergies        REVIEW OF SYSTEMS:     Could not be reliably reviewed due to mental status    VITALS  BP (!) 110/44   Pulse 59   Temp 98.6 °F (37 °C) (Oral)   Resp 20   Ht 5' 11\" (1.803 m)   Wt (!) 400 lb (181.4 kg)   SpO2 97%   BMI 55.79 kg/m²       PHYSICAL EXAMINATION:       General Appearance:  Alert, cooperative, no signs of distress, appears stated age, obese   Skin:  No rash or lesions   HEENT:  Normocephalic, conjunctiva/corneas clear; eyelids intact   Ears:  Normal external ear and canals   Nose: Nares normal, mucosa normal, no drainage    Throat: Lips and tongue normal; teeth normal;  gums normal   Neck: Supple, intact flexion, extension and rotation;   trachea midline;  no adenopathy;   thyroid: not enlarged;   no carotid pulse abnormality   Lungs:   Respirations unlabored   Heart: Regular rate and rhythm   Abdominal: BS present, soft, NT, ND   Extremities: Edema in both lower legs   Psych Normal affect      NEUROLOGICAL EXAMINATION:      Mental status    Awake, alert and oriented to hospital, self, not year/president   Cranial nerves    II - visual fields intact to confrontation                                                III, IV, VI - PERRLA, EOMI, no pupillary defect; no MYRON, no nystagmus, no ptosis   V - normal facial sensation                                                               VII - normal facial symmetry                                                             VIII - intact hearing                                                                             IX, X - symmetrical palate                                                                  XI - symmetrical shoulder shrug                                                       XII - midline tongue without atrophy or fasciculation      Motor function  No abnormal movements; tone and bulk okay  Moves all extremities but much less on both legs 3/5; arms 4/5.       Coordination FNF no dysmetria, heel to shin not able perform    Reflex function Trace reflex in arms, could not induce in LEs,       Gait                  Not testable     Sensory function Withdrawal to pain in all        LABS & TESTS:     LABS & TESTS  Lab Results   Component Value Date    WBC 9.3 11/07/2020    HGB 11.7 (L) 11/07/2020    HCT 39.5 (L) 11/07/2020    MCV 86.2 11/07/2020     (L) 11/07/2020

## 2020-11-09 NOTE — PLAN OF CARE
Ongoing  Goal: Free from intentional harm  Description: Free from intentional harm  Outcome: Ongoing     Problem: Daily Care:  Goal: Daily care needs are met  Description: Daily care needs are met  Outcome: Ongoing     Problem: Pain:  Goal: Patient's pain/discomfort is manageable  Description: Patient's pain/discomfort is manageable  Outcome: Ongoing     Problem: Skin Integrity:  Goal: Skin integrity will stabilize  Description: Skin integrity will stabilize  Outcome: Ongoing     Problem: Discharge Planning:  Goal: Patients continuum of care needs are met  Description: Patients continuum of care needs are met  Outcome: Ongoing     Problem: Nutrition  Goal: Optimal nutrition therapy  Outcome: Ongoing

## 2020-11-09 NOTE — PROGRESS NOTES
Physician Progress Note      Brittanie Jackson  CSN #:                  [id number]  :                       1965  ADMIT DATE:       2020 11:46 AM  DISCH DATE:  RESPONDING  PROVIDER #:        Jessi Haddad MD          QUERY TEXT:    Patient admitted with UTI, AMS. Documentation reflects Stage 3 pressure ulcer   to nose in  wound care RN consult. If possible, please document in the   progress notes and discharge summary if Stage 3 pressure ulcer to nose  was: The medical record reflects the following:  Risk Factors: 63 yr old admitted with catheter associated UTI and   encephalopathy. Had been refusing to remove glasses per notes  Clinical Indicators: Per wound care RN assessment: Wound Nose Left (Active)   Wound Etiology Pressure Stage  3 Left nare: pt found with glasses on and   serosanguinous drainage coming from nare. His glasses were removed to reveal a   pressure injury. The wound bed appears to have granular tissue, making it a   stage 3 pressure injury. Noted present on admission. Treatment: Foam dressing applied    thank you, Sanjuana Johnson RN, [facility]  [phone]  Options provided:  -- Stage 3 pressure ulcer to nose, POA  ruled out after study  -- Stage 3 pressure ulcer to nose, POA treated and resolved  -- Stage 3 pressure ulcer to nose POA confirmed after study  -- Other - I will add my own diagnosis  -- Disagree - Not applicable / Not valid  -- Disagree - Clinically unable to determine / Unknown  -- Refer to Clinical Documentation Reviewer    PROVIDER RESPONSE TEXT:    Stage 3 pressure ulcer to nose, POA treated and resolved.     Query created by: Corina Brumfield on 2020 10:57 AM      Electronically signed by:  Jessi Haddad MD 2020 3:24 PM

## 2020-11-10 VITALS
RESPIRATION RATE: 18 BRPM | HEART RATE: 57 BPM | DIASTOLIC BLOOD PRESSURE: 36 MMHG | WEIGHT: 315 LBS | SYSTOLIC BLOOD PRESSURE: 119 MMHG | BODY MASS INDEX: 44.1 KG/M2 | OXYGEN SATURATION: 94 % | HEIGHT: 71 IN | TEMPERATURE: 97.7 F

## 2020-11-10 LAB
CHOLESTEROL/HDL RATIO: 6.2
CHOLESTEROL: 248 MG/DL
CULTURE: NORMAL
CULTURE: NORMAL
ESTIMATED AVERAGE GLUCOSE: 186 MG/DL
FOLATE: 9 NG/ML
GLUCOSE BLD-MCNC: 107 MG/DL (ref 75–110)
GLUCOSE BLD-MCNC: 114 MG/DL (ref 75–110)
GLUCOSE BLD-MCNC: 147 MG/DL (ref 75–110)
HBA1C MFR BLD: 8.1 % (ref 4–6)
HDLC SERPL-MCNC: 40 MG/DL
LDL CHOLESTEROL: 162 MG/DL (ref 0–130)
Lab: NORMAL
Lab: NORMAL
SPECIMEN DESCRIPTION: NORMAL
SPECIMEN DESCRIPTION: NORMAL
THYROID PEROXIDASE (TPO) AB: 14.4 IU/ML (ref 0–35)
TRIGL SERPL-MCNC: 231 MG/DL
VITAMIN B-12: 1044 PG/ML (ref 232–1245)
VLDLC SERPL CALC-MCNC: ABNORMAL MG/DL (ref 1–30)

## 2020-11-10 PROCEDURE — 6360000002 HC RX W HCPCS: Performed by: NURSE PRACTITIONER

## 2020-11-10 PROCEDURE — 80061 LIPID PANEL: CPT

## 2020-11-10 PROCEDURE — 86376 MICROSOMAL ANTIBODY EACH: CPT

## 2020-11-10 PROCEDURE — 82607 VITAMIN B-12: CPT

## 2020-11-10 PROCEDURE — 36415 COLL VENOUS BLD VENIPUNCTURE: CPT

## 2020-11-10 PROCEDURE — 6370000000 HC RX 637 (ALT 250 FOR IP): Performed by: NURSE PRACTITIONER

## 2020-11-10 PROCEDURE — 99232 SBSQ HOSP IP/OBS MODERATE 35: CPT | Performed by: PSYCHIATRY & NEUROLOGY

## 2020-11-10 PROCEDURE — 99239 HOSP IP/OBS DSCHRG MGMT >30: CPT | Performed by: INTERNAL MEDICINE

## 2020-11-10 PROCEDURE — 83036 HEMOGLOBIN GLYCOSYLATED A1C: CPT

## 2020-11-10 PROCEDURE — 97167 OT EVAL HIGH COMPLEX 60 MIN: CPT

## 2020-11-10 PROCEDURE — 97535 SELF CARE MNGMENT TRAINING: CPT

## 2020-11-10 PROCEDURE — 82947 ASSAY GLUCOSE BLOOD QUANT: CPT

## 2020-11-10 PROCEDURE — 97116 GAIT TRAINING THERAPY: CPT

## 2020-11-10 PROCEDURE — 82746 ASSAY OF FOLIC ACID SERUM: CPT

## 2020-11-10 PROCEDURE — 6370000000 HC RX 637 (ALT 250 FOR IP): Performed by: PSYCHIATRY & NEUROLOGY

## 2020-11-10 PROCEDURE — 97164 PT RE-EVAL EST PLAN CARE: CPT

## 2020-11-10 PROCEDURE — 97110 THERAPEUTIC EXERCISES: CPT

## 2020-11-10 RX ORDER — OXYCODONE AND ACETAMINOPHEN 7.5; 325 MG/1; MG/1
1 TABLET ORAL EVERY 6 HOURS PRN
Qty: 12 TABLET | Refills: 0 | Status: SHIPPED | OUTPATIENT
Start: 2020-11-10 | End: 2020-11-13

## 2020-11-10 RX ORDER — BETHANECHOL CHLORIDE 10 MG/1
10 TABLET ORAL 3 TIMES DAILY
Qty: 90 TABLET | Refills: 3 | Status: ON HOLD | DISCHARGE
Start: 2020-11-10 | End: 2021-10-15

## 2020-11-10 RX ORDER — CLONAZEPAM 1 MG/1
1 TABLET ORAL 2 TIMES DAILY PRN
Qty: 6 TABLET | Refills: 0 | Status: ON HOLD | OUTPATIENT
Start: 2020-11-10 | End: 2021-01-14

## 2020-11-10 RX ORDER — ACETAMINOPHEN 325 MG/1
650 TABLET ORAL EVERY 6 HOURS PRN
Qty: 120 TABLET | Refills: 3 | Status: ON HOLD | DISCHARGE
Start: 2020-11-10 | End: 2021-10-15 | Stop reason: ALTCHOICE

## 2020-11-10 RX ORDER — SPIRONOLACTONE 25 MG/1
75 TABLET ORAL 2 TIMES DAILY
Qty: 30 TABLET | Refills: 3 | Status: ON HOLD | DISCHARGE
Start: 2020-11-10 | End: 2021-10-15 | Stop reason: SDUPTHER

## 2020-11-10 RX ORDER — PREGABALIN 150 MG/1
150 CAPSULE ORAL 2 TIMES DAILY
Qty: 6 CAPSULE | Refills: 0 | Status: ON HOLD | OUTPATIENT
Start: 2020-11-10 | End: 2021-10-15 | Stop reason: SDUPTHER

## 2020-11-10 RX ADMIN — PANTOPRAZOLE SODIUM 40 MG: 40 TABLET, DELAYED RELEASE ORAL at 06:03

## 2020-11-10 RX ADMIN — VENLAFAXINE HYDROCHLORIDE 150 MG: 75 CAPSULE, EXTENDED RELEASE ORAL at 09:03

## 2020-11-10 RX ADMIN — ENOXAPARIN SODIUM 40 MG: 40 INJECTION SUBCUTANEOUS at 09:05

## 2020-11-10 RX ADMIN — DULOXETINE HYDROCHLORIDE 60 MG: 60 CAPSULE, DELAYED RELEASE ORAL at 09:03

## 2020-11-10 RX ADMIN — RIFAXIMIN 550 MG: 550 TABLET ORAL at 09:05

## 2020-11-10 RX ADMIN — BUMETANIDE 2 MG: 1 TABLET ORAL at 09:03

## 2020-11-10 RX ADMIN — TAMSULOSIN HYDROCHLORIDE 0.4 MG: 0.4 CAPSULE ORAL at 09:05

## 2020-11-10 RX ADMIN — LEVOTHYROXINE SODIUM 150 MCG: 0.15 TABLET ORAL at 06:03

## 2020-11-10 RX ADMIN — OXYBUTYNIN CHLORIDE 5 MG: 5 TABLET ORAL at 09:05

## 2020-11-10 RX ADMIN — SPIRONOLACTONE 75 MG: 25 TABLET ORAL at 09:05

## 2020-11-10 RX ADMIN — INSULIN LISPRO 1 UNITS: 100 INJECTION, SOLUTION INTRAVENOUS; SUBCUTANEOUS at 12:11

## 2020-11-10 RX ADMIN — OXYCODONE HYDROCHLORIDE AND ACETAMINOPHEN 1.5 TABLET: 5; 325 TABLET ORAL at 09:15

## 2020-11-10 RX ADMIN — ALOGLIPTIN 25 MG: 25 TABLET, FILM COATED ORAL at 12:11

## 2020-11-10 RX ADMIN — NADOLOL 40 MG: 20 TABLET ORAL at 09:03

## 2020-11-10 RX ADMIN — ASPIRIN 81 MG: 81 TABLET, COATED ORAL at 09:03

## 2020-11-10 RX ADMIN — CLONAZEPAM 1 MG: 0.5 TABLET ORAL at 09:03

## 2020-11-10 RX ADMIN — LORAZEPAM 0.5 MG: 2 INJECTION INTRAMUSCULAR; INTRAVENOUS at 03:47

## 2020-11-10 RX ADMIN — METFORMIN HYDROCHLORIDE 500 MG: 500 TABLET ORAL at 09:05

## 2020-11-10 RX ADMIN — POTASSIUM CHLORIDE 20 MEQ: 20 TABLET, EXTENDED RELEASE ORAL at 09:05

## 2020-11-10 RX ADMIN — INSULIN GLARGINE 80 UNITS: 100 INJECTION, SOLUTION SUBCUTANEOUS at 09:09

## 2020-11-10 RX ADMIN — ANTI-FUNGAL POWDER MICONAZOLE NITRATE TALC FREE: 1.42 POWDER TOPICAL at 09:08

## 2020-11-10 RX ADMIN — PREGABALIN 150 MG: 75 CAPSULE ORAL at 09:05

## 2020-11-10 ASSESSMENT — PAIN SCALES - WONG BAKER
WONGBAKER_NUMERICALRESPONSE: 0

## 2020-11-10 ASSESSMENT — PAIN SCALES - GENERAL
PAINLEVEL_OUTOF10: 8

## 2020-11-10 ASSESSMENT — PAIN DESCRIPTION - LOCATION: LOCATION: GENERALIZED

## 2020-11-10 NOTE — PROGRESS NOTES
[facility]  Office: [address], DO, Yaron Guan, DO, Momo Lozano, DO, Lillieadenike Watt Blood, DO, Alison Piña MD, Linh Peterson MD, Quang Dupont MD, Susy Kurtz MD, Ramon Whitaker MD, Brianne Mir MD, Kirstie Hernandez MD, Jorge Strickland MD, Emily Carlisle MD, Alisa Saavedra, DO, Elva Mello MD, Renato Child MD, Media Sensor, DO, Candie Kennedy MD,  Korin Watson DO, Jailyn Longoria MD, Vadim Patton MD, Nabil Granda, [facility], [facility], CNP, Tania Epps, CNP, Romelia Marcelo, CNS, Molina Pena, CNP, Destiney Garduno, CNP, Lawrence Kirby, CNP, Merlinda Goring, CNP, Portia Petersen, CNP, Larry Sheehan PA-C, Basia Sosa, [facility], Mart Ruiz, CNP, Rosita Arita, CNP, Porfirio Norris, CNP, Addy Rick, CNP, Karol DraperehanN[facility]    Progress Note    11/10/2020    12:14 PM    Name:   Fuentes Pandya  MRN:     [id number]     [place]:      [de-identified]   Room:   [address] [place] [address] Day:  6  Admit Date:  11/4/2020 11:46 AM    PCP:   Bernard Parkinson MD  Code Status:  Full Code    Subjective:     C/C:   Chief Complaint   Patient presents with    Altered Mental Status     Interval History Status: improved. Patient is resting comfortably and denies any complaints of chest pain, shortness of breath, nausea or vomiting. Brief History: This is a 62-year-old male admitted with alteration mentation with initial concern for Clemons related UTI and toxic metabolic encephalopathy. Urine culture did grow E. coli and Enterococcus is felt to be the results of colonization with chronic indwelling Clemons. He was eval by neurology for the altered mental status as well as ID for the possible infection. Is evaluated by urology and treated with bladder irrigation. Ultimately his mentation was slowly improving. He remains weak and will benefit from skilled nursing facility.     Review of Systems:     Constitutional:  negative for chills, fevers, sweats  Respiratory:  negative for cough, dyspnea on exertion, shortness of breath, wheezing  Cardiovascular:  negative for chest pain, chest pressure/discomfort, lower extremity edema, palpitations  Gastrointestinal:  negative for abdominal pain, constipation, diarrhea, nausea, vomiting  Neurological:  negative for dizziness, headache    Medications: Allergies:     Allergies   Allergen Reactions    No Known Allergies        Current Meds:   Scheduled Meds:    clonazePAM  1 mg Oral BID    sodium chloride flush  10 mL Intravenous 2 times per day    enoxaparin  40 mg Subcutaneous BID    aspirin  81 mg Oral Daily    tamsulosin  0.4 mg Oral Daily    miconazole   Topical BID    pantoprazole  40 mg Oral QAM AC    venlafaxine  150 mg Oral Daily    bumetanide  2 mg Oral BID    bethanechol  10 mg Oral TID    DULoxetine  60 mg Oral QAM    levothyroxine  150 mcg Oral Daily    metFORMIN  500 mg Oral BID    nadolol  40 mg Oral Daily    pregabalin  150 mg Oral BID    alogliptin  25 mg Oral Daily    rifaximin  550 mg Oral BID    oxybutynin  5 mg Oral BID    naloxegol  25 mg Oral QAM    spironolactone  75 mg Oral BID    potassium chloride  20 mEq Oral Daily with breakfast    insulin glargine  80 Units Subcutaneous BID    insulin lispro  0-6 Units Subcutaneous TID WC    insulin lispro  0-3 Units Subcutaneous Nightly     Continuous Infusions:    sodium chloride Stopped (11/09/20 1022)    dextrose       PRN Meds: lidocaine, metoprolol, potassium chloride, LORazepam, sodium chloride flush, acetaminophen **OR** acetaminophen, polyethylene glycol, promethazine **OR** ondansetron, nicotine, albuterol sulfate HFA, oxyCODONE-acetaminophen, hydrOXYzine, ibuprofen, glucose, dextrose, glucagon (rDNA), dextrose, albuterol    Data:     Past Medical History:   has a past medical history of Anxiety and depression, Back pain, chronic, BPH (benign prostatic hyperplasia), CHF (congestive heart failure) ([facility] Cirrhosis ([facility] Diabetes mellitus ([facility] GERD (gastroesophageal reflux disease), H/O cardiac catheterization, Hepatitis, Hiatal hernia, History of alcohol abuse, Hyperlipidemia, Hypertension, IBS (irritable bowel syndrome), Morbid obesity ([facility] Neuropathy, On home oxygen therapy, Pancreatitis, Primary osteoarthritis of right knee, Sleep apnea, Thyroid disease, and Urinary bladder neurogenic dysfunction. Social History:   reports that he has never smoked. He has never used smokeless tobacco. He reports that he does not drink alcohol or use drugs. Family History:   Family History   Adopted: Yes       Vitals:  BP (!) 119/36   Pulse 57   Temp 97.7 °F (36.5 °C) (Oral)   Resp 18   Ht 5' 11\" (1.803 m)   Wt (!) 400 lb (181.4 kg)   SpO2 94%   BMI 55.79 kg/m²   Temp (24hrs), Av.3 °F (36.8 °C), Min:97.7 °F (36.5 °C), Max:98.6 °F (37 °C)    Recent Labs     11/09/20  1925 11/10/20  0547 11/10/20  0759 11/10/20  1116   POCGLU 96 107 114* 147*       I/O (24Hr): Intake/Output Summary (Last 24 hours) at 11/10/2020 1214  Last data filed at 11/10/2020 0801  Gross per 24 hour   Intake 118 ml   Output 2950 ml   Net -2832 ml       Labs:  Hematology:No results for input(s): WBC, RBC, HGB, HCT, MCV, MCH, MCHC, RDW, PLT, MPV, SEDRATE, CRP, INR, DDIMER, [id number], LABABSO in the last 72 hours.     Invalid input(s): PT  Chemistry:  Recent Labs     20  1614   K 3.3*     Recent Labs     20  1108 20  1648 11/09/20  1925 11/10/20  0547 11/10/20  0705 11/10/20  0759 11/10/20  1116   LABA1C  --   --   --   --  8.1*  --   --    CHOL  --   --   --   --  248*  --   --    HDL  --   --   --   --  40*  --   --    LDLCHOLESTEROL  --   --   --   --  162*  --   --    CHOLHDLRATIO  --   --   --   --  6.2*  --   --    TRIG  --   --   --   --  231*  --   --    VLDL  --   --   --   --  NOT REPORTED*  --   --    POCGLU 153* 121* 96 107  --  114* 147*     ABG:  Lab Results   Component Value Date    POCPH 7.42 liver ([address] 11/4/2020 Yes    Bipolar disorder ([address] [facility] 11/4/2020 Yes    Type 2 diabetes mellitus with diabetic neuropathy, with long-term current use of insulin ([address] 11/4/2020 Yes    Hyperlipidemia 49/8/1172 Yes    Metabolic encephalopathy 23/2/7939 Yes    FABI (obstructive sleep apnea) 11/4/2020 Yes    Hypothyroidism 11/4/2020 Yes    Urine retention 11/4/2020 Yes    Chronic indwelling Clemons catheter (Chronic) 11/4/2020 Yes    Back pain, chronic 11/4/2020 Yes    Chronic diastolic congestive heart failure ([address] 11/4/2020 Yes    GERD (gastroesophageal reflux disease) 11/4/2020 Yes    Morbid obesity ([facility] 11/4/2020 Yes    Urinary tract infection associated with indwelling urethral catheter ([address] 11/10/2020 Yes    Musculoskeletal immobility 11/4/2020 Yes    Pyocystis 11/7/2020 Yes    Myoclonic jerking 11/7/2020 Yes          Plan:        1. Monitor off antibiotics per ID  2. Clemons care per urology  3. Continue home psychiatric medications  4. Insulin scale as ordered  5. Continue present cardiac medications  6. PT and OT  7. Continue home maintenance medications  8. GI and DVT prophylaxis  9.  Discharge planning to skilled facility    Wil Rosales DO  11/10/2020  12:14 PM

## 2020-11-10 NOTE — PROGRESS NOTES
Physical Therapy    Facility/Department: [facility]  Reassessment    NAME: Amy Bunch  : 1965  MRN: [id number]    Date of Service: 11/10/2020    Discharge Recommendations:  Subacute/Skilled Nursing Facility        Assessment   Body structures, Functions, Activity limitations: Decreased functional mobility ; Decreased ADL status; Decreased ROM; Decreased strength;Decreased safe awareness;Decreased cognition  Assessment: Pt only tolerated rolling during mobility reassessment. Prognosis: Good  Decision Making: High Complexity  PT Education: Goals;PT Role;Plan of Care  REQUIRES PT FOLLOW UP: Yes  Activity Tolerance  Activity Tolerance: Treatment limited secondary to medical complications (free text)       Patient Diagnosis(es): The primary encounter diagnosis was Urinary tract infection associated with indwelling urethral catheter, initial encounter ([facility] Diagnoses of Altered mental status, unspecified altered mental status type, Chronic low back pain, unspecified back pain laterality, unspecified whether sciatica present, and Anxiety and depression were also pertinent to this visit. has a past medical history of Anxiety and depression, Back pain, chronic, BPH (benign prostatic hyperplasia), CHF (congestive heart failure) ([facility] Cirrhosis ([facility] Diabetes mellitus ([facility] GERD (gastroesophageal reflux disease), H/O cardiac catheterization, Hepatitis, Hiatal hernia, History of alcohol abuse, Hyperlipidemia, Hypertension, IBS (irritable bowel syndrome), Morbid obesity ([facility] Neuropathy, On home oxygen therapy, Pancreatitis, Primary osteoarthritis of right knee, Sleep apnea, Thyroid disease, and Urinary bladder neurogenic dysfunction. has a past surgical history that includes Colonoscopy;  Abdomen surgery; hernia repair; Endoscopy, colon, diagnostic; Upper gastrointestinal endoscopy (2017); pr egd transoral biopsy single/multiple (N/A, 2017); pr deep dissec foot infec,1 bursa (Bilateral, 8/22/2017); Cystoscopy (01/24/2018); pr cystourethroscopy (N/A, 1/24/2018); Incisional hernia repair (05/20/2018); ventral hernia repair (N/A, 5/20/2018); Cystocopy (02/20/2019); Cystoscopy (N/A, 2/20/2019); Upper gastrointestinal endoscopy (N/A, 3/14/2019); and Cystoscopy (N/A, 8/23/2019). Restrictions  Restrictions/Precautions  Restrictions/Precautions: General Precautions, Fall Risk  Position Activity Restriction  Other position/activity restrictions: IV, wells, telemetry, confusion  Vision/Hearing        Subjective  General  Patient assessed for rehabilitation services?: Yes  Follows Commands: Within Functional Limits  Pain Screening  Patient Currently in Pain: No          Orientation     Social/Functional History  Social/Functional History  Lives With: Son  Type of Home: House  Home Layout: One level  Home Access: Ramped entrance  Bathroom Shower/Tub: (sponge bathes)  Bathroom Toilet: Handicap height  Home Equipment: Rolling walker, Wheelchair-manual, Oxygen(2-3 L O2 mostly continuous, but sometimes just at night and while napping.)  Receives Help From: Family, Home health([facility] 7 days a week, 4 hours/day.)  ADL Assistance: Needs assistance(pt has assist for all ADLs. Sponge bathes, assist for dressing. Has a wells. States he can take himself to bathroom for BM? ? )  Homemaking Assistance: Needs assistance  Ambulation Assistance: Independent(questionable. States he uses walker in home, w/c to appointments)  Transfer Assistance: Independent(has a lift chair, handicapped toilet.)  Active : No  Patient's  Info: takes transportation service-goes in w/c  Leisure & Hobbies: watches TV  Additional Comments: Patient has difficulty answering PLOF questions. Per chart patient lives with his son and has home health. Patient can typically ambute to bathroom and back.  . Pt reports having falls at home (a couple prior to admission), sleeps in lift chair, states he likes to have someone with him anytime he is up  Cognition        Objective     Observation/Palpation  Observation: obese. Pt in bariatric bed. Has not been up since admission    PROM RLE (degrees)  RLE General PROM: hip flexion 0-60 degrees, hip abduction 10-20 degrees, knee 0-60 degrees, ankle DF to 5 degrees, hip positioned in 20 degrees ER  PROM LLE (degrees)  LLE General PROM: hip flexion 0-40 degrees, hip abduction 15-20 degrees, knee 0-45 degrees, ankle DF to neutral, hip positioned in 30 degrees ER, ankle significantly ER position           Bed mobility  Rolling to Right: Maximum assistance;2 Person assistance(to achieve partial roll; pt. with assist to reach bedrail to hold and assist)  Supine to Sit: 2 Person assistance;Maximum assistance(to achieve partial roll; pt. with assist to reach bedrail to hold and assist)  Comment: Pt. putting in questionable effort and needign a lot of encouragement. After bed mob., pt. asked PT/OT if he could have a break to watch his \"morning shows. \"  (had cartoons on tv\"              Exercises  Quad Sets: 10  Heelslides: 5  Gluteal Sets: 10  Ankle Pumps: 10  Comments: Pt. lies with bilat. hip ER. Ex. given for pt. to IR hips to neutral alignment and combine with a quad set. Pt. exhausted after 5 reps. Instructed to do as able in bed, along with AP and heel slides. Plan   Plan  Times per week: 1x/d, for 5-6d/wk  Current Treatment Recommendations: Strengthening, ROM, Safety Education & Training, Patient/Caregiver Education & Training, Functional Mobility Training  Safety Devices  Type of devices: Call light within reach, Left in bed, Nurse notified         Goals  Short term goals  Time Frame for Short term goals: 12 visits  Short term goal 1: Patient will tolerate 30 minutes of ther-ex and ther-act. Short term goal 2: Patient will demo 3+/5 strength throughout UE and LE. Short term goal 3: Patient will be mod assist x 2 for bed mobility. Short term goal 4: Pt to be able to roll to side, SBA, in bed.    Pt. to be assessed for supine to sit and reverse transfer when approp. Patient Goals   Patient goals : Unable to state due to confusion       Therapy Time   Individual Concurrent Group Co-treatment   Time In 1002         Time Out 1051         Minutes 49              Treatment time:  38min. Co-treatment with OT warranted secondary to decreased safety and independence requiring 2 skilled therapy professionals to address individual discipline's goals.    Mary Clark, PT

## 2020-11-10 NOTE — CARE COORDINATION
Social work:  Patient to [employer] to [employer]  via [employer]  at 1:30PM.  # for RN report: [phone]. Completed KAROLYN will need faxed to [phone]. Informed RN, pt, unit and facility of dc time, agreeable to plan. HENS completed.

## 2020-11-10 NOTE — DISCHARGE SUMMARY
[facility]  Office: [address], DO, Sydnee Bloom DO, Madie Mcdonough DO, Josefina Anderson DO, Jo Ann Nguyen MD, Geovanni Hines MD, Apple Serna MD, Jordan Painting MD, Pietro Salas MD, Jeaneth Stern MD, Janine Mckeon MD, Kirsty Fierro MD, Emily Mcbride MD, Mamie Boogie DO, Alex Cantrell MD, Jeffrey Smith MD, Elliott Ramachandran DO, Julieth Ivey MD,  Sarika Fleming DO, David Robin MD, Bucky Calix MD, Oskar Felix, [facility], [facility], CNP, Severo Urrutia, CNP, Marla Hendricks, CNS, Cecilia You CNP, Eden Guzman, CNP, Lidia Villegas, CNP, Arlen Mejía, [facility], Kimmie Huerta, CNP, Yasmine Rojas PA-C, Demi Resendez, [facility], Mica Gregorio, CNP, Catrachita Donaldson, CNP, Tim Whitaker, CNP, Wilian Narayanan, [facility], [facility], [facility]    Discharge Summary     Patient ID: Umberto Barragan  :  1965   MRN: [id number]     ACCOUNT:  [de-identified]   Patient's PCP: Joshua Boogie MD  Admit Date: 2020   Discharge Date: 11/10/2020     Length of Stay: 6  Code Status:  Full Code  Admitting Physician: Janine Mckeon MD  Discharge Physician: Jacquie Scott DO     Active Discharge Diagnoses:     Hospital Problem Lists:  Principal Problem:    Delirium due to another medical condition  Active Problems:    Alcoholic cirrhosis of liver (HCC)    Bipolar disorder ([facility]    Type 2 diabetes mellitus with diabetic neuropathy, with long-term current use of insulin ([facility]    Hyperlipidemia    Metabolic encephalopathy    FABI (obstructive sleep apnea)    Hypothyroidism    Urine retention    Chronic indwelling Clemons catheter    Back pain, chronic    Chronic diastolic congestive heart failure (HCC)    GERD (gastroesophageal reflux disease)    Morbid obesity ([facility]    Urinary tract infection associated with indwelling urethral catheter ([facility]    Musculoskeletal immobility    Pyocystis    Myoclonic jerking  Resolved Problems:    * No resolved hospital problems. *      Admission Condition:  fair     Discharged Condition: stable    Hospital Stay:     Hospital Course:  Karine Perez is a 63 y.o. male who was admitted for the management of  Delirium due to another medical condition , presented to ER with Altered Mental Status    This is a 60-year-old male with chronic indwelling Clemons catheter who presents with altered mental status and findings concerning for urinary tract infection. He is admitted to hospital with presumed UTI related to Clemons and acute delirium. He was initially treated with IV antibiotics but his culture was more consistent with colonization. ID recommended to monitor off antibiotics. His mentation was slowly improving throughout his hospital stay. He remained physically weak and PT and OT evaluated and recommended skilled placement for continued rehab and care. Underwent bladder irrigation per urology recommendations with continued outpatient management of chronic indwelling Clemons. Significant therapeutic interventions: Admit inpatient, neurology, ID and urology consults. Treat with IV antibiotics pain urine culture, likely colonization.   Bladder irrigation    Significant Diagnostic Studies:   Labs / Micro:  CBC:   Lab Results   Component Value Date    WBC 9.3 11/07/2020    RBC 4.58 11/07/2020    RBC 3.93 04/02/2012    HGB 11.7 11/07/2020    HCT 39.5 11/07/2020    MCV 86.2 11/07/2020    MCH 25.5 11/07/2020    MCHC 29.6 11/07/2020    RDW 14.9 11/07/2020     11/07/2020     04/02/2012     BMP:    Lab Results   Component Value Date    GLUCOSE 233 11/07/2020    GLUCOSE 102 08/30/2011     11/07/2020    K 3.3 11/07/2020     11/07/2020    CO2 31 11/07/2020    ANIONGAP 9 11/07/2020    BUN 10 11/07/2020    CREATININE 0.67 11/07/2020    BUNCRER 15 11/07/2020    CALCIUM 8.9 11/07/2020    LABGLOM >60 11/07/2020    GFRAA >60 11/07/2020    GFR      11/07/2020    GFR NOT REPORTED 11/07/2020 \  Radiology:  Ct Head Wo Contrast    Result Date: 11/6/2020  No acute intracranial abnormality. Ct Head Wo Contrast    Result Date: 11/4/2020  No acute intracranial abnormality. Xr Chest Portable    Result Date: 11/6/2020  Bibasilar airspace disease suspected. Consider formal AP and lateral chest radiographs for further evaluation. Moderate cardiomegaly. Xr Chest Portable    Result Date: 11/4/2020  Small right lung base opacity is nonspecific but likely atelectasis and a pleural effusion. Pneumonia is not excluded but felt to be less likely. Mri Brain Wo Contrast    Result Date: 11/9/2020  Unremarkable MRI of the brain without acute intracranial abnormality. Consultations:    Consults:     Final Specialist Recommendations/Findings:   IP CONSULT TO INTERNAL MEDICINE  IP CONSULT TO UROLOGY  IP CONSULT TO SOCIAL WORK  IP CONSULT TO DIETITIAN  IP CONSULT TO INFECTIOUS DISEASES  IP CONSULT TO NEUROLOGY      The patient was seen and examined on day of discharge and this discharge summary is in conjunction with any daily progress note from day of discharge.     Discharge plan:     Disposition: Skilled facility    Physician Follow Up:   Gabby Wadsworth MD  [address] [facility] [address] [facility]  [phone]    Schedule an appointment as soon as possible for a visit in 1 week      Hector Thomson MD  [address], [address] [place], [place] [address]  [phone]    In 2 weeks      Pb Rojas, [address] [place] [address] [address]  [phone]    Schedule an appointment as soon as possible for a visit in 3 weeks         Requiring Further Evaluation/Follow Up POST HOSPITALIZATION/Incidental Findings: BMP in 1 week    Diet: cardiac diet    Activity: As tolerated    Instructions to Patient: Take medications as prescribed    Discharge Medications:      Medication List      START taking these medications    acetaminophen 325 MG tablet  Commonly known as:  TYLENOL  Take 2 tablets by mouth every 6 hours as needed for Pain or Fever     * insulin lispro 100 UNIT/ML injection vial  Commonly known as:  HUMALOG  Inject 0-3 Units into the skin nightly     * insulin lispro 100 UNIT/ML injection vial  Commonly known as:  HUMALOG  Inject 0-6 Units into the skin 3 times daily (with meals)         * This list has 2 medication(s) that are the same as other medications prescribed for you. Read the directions carefully, and ask your doctor or other care provider to review them with you. CHANGE how you take these medications    GABRIEL ELAINE  What changed:  Another medication with the same name was removed. Continue taking this medication, and follow the directions you see here. QUEtiapine 50 MG extended release tablet  Commonly known as:  SEROQUEL XR  What changed:  Another medication with the same name was removed. Continue taking this medication, and follow the directions you see here. spironolactone 25 MG tablet  Commonly known as:  ALDACTONE  Take 3 tablets by mouth 2 times daily Indications: pt takes 25mg and 50mg tab to equal 75mg BID  What changed:  how much to take        CONTINUE taking these medications    aspirin 81 MG EC tablet  Take 1 tablet by mouth daily     bethanechol 10 MG tablet  Commonly known as:  URECHOLINE  Take 1 tablet by mouth 3 times daily     Bumex 2 MG tablet  Generic drug:  bumetanide     clonazePAM 1 MG tablet  Commonly known as:  KLONOPIN  Take 1 tablet by mouth 2 times daily as needed for Anxiety for up to 3 days.      diphenoxylate-atropine 2.5-0.025 MG per tablet  Commonly known as:  LOMOTIL     DULoxetine 60 MG extended release capsule  Commonly known as:  CYMBALTA     esomeprazole 40 MG delayed release capsule  Commonly known as:  NEXIUM     fluocinonide 0.05 % external solution  Commonly known as:  LIDEX     glipiZIDE 10 MG tablet  Commonly known as:  GLUCOTROL     hydrOXYzine 25 MG tablet  Commonly known as:  ATARAX     ibuprofen 600 MG tablet  Commonly known as:  ADVIL;MOTRIN     ketoconazole 2 % shampoo  Commonly known as:  NIZORAL     levothyroxine 175 MCG tablet  Commonly known as:  SYNTHROID     metFORMIN 500 MG tablet  Commonly known as:  GLUCOPHAGE     nadolol 40 MG tablet  Commonly known as:  CORGARD     nystatin 216381 UNIT/GM powder  Commonly known as:  MYCOSTATIN     oxybutynin 5 MG tablet  Commonly known as:  DITROPAN  Take 1 tablet by mouth 2 times daily     oxyCODONE-acetaminophen 7.5-325 MG per tablet  Commonly known as:  PERCOCET  Take 1 tablet by mouth every 6 hours as needed for Pain for up to 3 days. potassium chloride 10 MEQ extended release tablet  Commonly known as:  KLOR-CON     pregabalin 150 MG capsule  Commonly known as:  LYRICA  Take 1 capsule by mouth 2 times daily for 3 days.      ProAir  (90 Base) MCG/ACT inhaler  Generic drug:  albuterol sulfate HFA     rifaximin 550 MG tablet  Commonly known as:  Xifaxan  Take 1 tablet by mouth 2 times daily     SITagliptin 100 MG tablet  Commonly known as:  JANUVIA  Take 1 tablet by mouth daily     tamsulosin 0.4 MG capsule  Commonly known as:  Flomax  Take 1 capsule by mouth daily     triamcinolone 0.025 % cream  Commonly known as:  KENALOG     venlafaxine 150 MG extended release capsule  Commonly known as:  EFFEXOR XR        STOP taking these medications    hydrocortisone 2.5 % cream     LORazepam 1 MG tablet  Commonly known as:  ATIVAN     nitroGLYCERIN 0.4 MG SL tablet  Commonly known as:  NITROSTAT           Where to Get Your Medications      You can get these medications from any pharmacy    Bring a paper prescription for each of these medications  · clonazePAM 1 MG tablet  · oxyCODONE-acetaminophen 7.5-325 MG per tablet  · pregabalin 150 MG capsule     Information about where to get these medications is not yet available    Ask your nurse or doctor about these medications  · acetaminophen 325 MG tablet  · bethanechol 10 MG tablet  · insulin lispro 100 UNIT/ML

## 2020-11-10 NOTE — PROGRESS NOTES
[facility]   Urology Progress Note            Subjective: Follow-up urinary retention, catheter associated UTI    Patient Vitals for the past 24 hrs:   BP Temp Temp src Pulse Resp SpO2   11/10/20 0341 (!) 133/53 98.4 °F (36.9 °C) Oral 55 18 98 %   11/09/20 2320 (!) 110/47 98.4 °F (36.9 °C) Oral 54 18 98 %   11/09/20 1927 94/80 98.4 °F (36.9 °C) Oral 57 20 100 %   11/09/20 1529 (!) 132/53 97.9 °F (36.6 °C) Oral 54 18 94 %   11/09/20 1239 (!) 110/44 98.6 °F (37 °C) Oral 59 20 97 %   11/09/20 1013 -- -- -- -- -- 98 %   11/09/20 0759 (!) 128/53 98.2 °F (36.8 °C) Axillary 63 18 92 %       Intake/Output Summary (Last 24 hours) at 11/10/2020 0715  Last data filed at 11/10/2020 0610  Gross per 24 hour   Intake --   Output 3850 ml   Net -3850 ml       No results for input(s): WBC, HGB, HCT, MCV, PLT in the last 72 hours. Recent Labs     11/07/20  1614   K 3.3*       No results for input(s): COLORU, PHUR, LABCAST, WBCUA, RBCUA, MUCUS, TRICHOMONAS, YEAST, BACTERIA, CLARITYU, SPECGRAV, LEUKOCYTESUR, UROBILINOGEN, BILIRUBINUR, BLOODU in the last 72 hours.     Invalid input(s): NITRATE, GLUCOSEUKETONESUAMORPHOUS    Additional Lab/culture results:    Physical Exam: Patient in bed, bladder decompressed, Clemons in place urine clear, bladder irrigation with evacuation of pyocystis completed last week    Interval Imaging Findings:    Impression:    Patient Active Problem List   Diagnosis    Alcoholic cirrhosis of liver ([facility]    Peripheral edema    Bipolar disorder ([facility]    Type 2 diabetes mellitus with diabetic neuropathy, with long-term current use of insulin ([address]    Essential hypertension    Hyperlipidemia    Weakness    Hyponatremia    Metabolic encephalopathy    FABI (obstructive sleep apnea)    Primary osteoarthritis of right knee    Type 2 diabetes mellitus with diabetic neuropathy ([address]    Hypothyroidism    Urine retention    Anemia    Cellulitis of right lower extremity    Slow transit constipation    Constipation    Iron deficiency anemia due to chronic blood loss    Gastroesophageal reflux disease without esophagitis    Secondary esophageal varices without bleeding (HCC)    Portal hypertension (HCC)    Morbid obesity with BMI of 50.0-59.9, adult (HCC)    Venous stasis dermatitis of both lower extremities    Cellulitis of left lower extremity    Cellulitis of perineum    Epididymoorchitis    Abdominal pain    Multiple and open wound of lower limb    Scrotal edema    Retention, urine    SBO (small bowel obstruction) ([address]    Incisional hernia with obstruction but no gangrene    Chronic indwelling Clemons catheter    Anxiety and depression    Back pain, chronic    BPH (benign prostatic hyperplasia)    Chronic diastolic congestive heart failure (HCC)    Cirrhosis (HCC)    Diabetes mellitus ([address]    GERD (gastroesophageal reflux disease)    H/O cardiac catheterization    Hepatitis    Hiatal hernia    History of alcohol abuse    Hypertension    IBS (irritable bowel syndrome)    Morbid obesity (HCC)    Neuropathy    On home oxygen therapy    Pancreatitis    Sleep apnea    Thyroid disease    Urinary bladder neurogenic dysfunction    Urinary tract infection associated with indwelling urethral catheter ([address]    Delirium due to another medical condition    Musculoskeletal immobility    Pyocystis    Myoclonic jerking       Plan: Maintain indwelling Clemons, plan for outpatient follow-up and catheter change every 2 to 3 weeks    Vani Graham  7:15 AM 11/10/2020

## 2020-11-10 NOTE — DISCHARGE INSTR - COC
EGD TRANSORAL BIOPSY SINGLE/MULTIPLE N/A 7/19/2017    EGD BIOPSY performed by Ladan Manzo MD at [facility]  07/19/2017    polypectomy    UPPER GASTROINTESTINAL ENDOSCOPY N/A 3/14/2019    EGD BIOPSY performed by Belinda Garcia MD at [facility] N/A 5/20/2018    REPAIR AND REDUCTION OF RECURRENT INCARCERATED INCISIONAL HERNIA WITH MESH performed by Gurdeep Bunch MD at [facility]       Immunization History: There is no immunization history on file for this patient. Active Problems:  Patient Active Problem List   Diagnosis Code    Alcoholic cirrhosis of liver (HCC) K70.30    Peripheral edema R60.9    Bipolar disorder ([facility] [address] F31.9    Type 2 diabetes mellitus with diabetic neuropathy, with long-term current use of insulin ([facility] E11.40, Z79.4    Essential hypertension I10    Hyperlipidemia E78.5    Weakness R53.1    Hyponatremia Y53.7    Metabolic encephalopathy M89.72    FABI (obstructive sleep apnea) G47.33    Primary osteoarthritis of right knee M17.11    Type 2 diabetes mellitus with diabetic neuropathy (HCC) E11.40    Hypothyroidism E03.9    Urine retention R33.9    Anemia D64.9    Cellulitis of right lower extremity L03.115    Slow transit constipation K59.01    Constipation K59.00    Iron deficiency anemia due to chronic blood loss D50.0    Gastroesophageal reflux disease without esophagitis K21.9    Secondary esophageal varices without bleeding (HCC) I85.10    Portal hypertension (HCC) K76.6    Morbid obesity with BMI of 50.0-59.9, adult (HCC) E66.01, Z68.43    Venous stasis dermatitis of both lower extremities I87.2    Cellulitis of left lower extremity L03. 116    Cellulitis of perineum L03.315    Epididymoorchitis N45.3    Abdominal pain R10.9    Multiple and open wound of lower limb S81.809A    Scrotal edema N50.89    Retention, urine R33.9    SBO (small bowel obstruction) ([facility] [address] K56.609    Incisional hernia with obstruction but no gangrene K43.0    Chronic indwelling Clemons catheter Z97.8    Anxiety and depression F41.9, F32.9    Back pain, chronic M54.9, G89.29    BPH (benign prostatic hyperplasia) N40.0    Chronic diastolic congestive heart failure (HCC) I50.32    Cirrhosis (HCC) K74.60    Diabetes mellitus (HCC) E11.9    GERD (gastroesophageal reflux disease) K21.9    H/O cardiac catheterization Z98.890    Hepatitis K75.9    Hiatal hernia K44.9    History of alcohol abuse F10.11    Hypertension I10    IBS (irritable bowel syndrome) K58.9    Morbid obesity (HCC) E66.01    Neuropathy G62.9    On home oxygen therapy Z99.81    Pancreatitis K85.90    Sleep apnea G47.30    Thyroid disease E07.9    Urinary bladder neurogenic dysfunction N31.9    Urinary tract infection associated with indwelling urethral catheter (HCC) T83.511A, N39.0    Delirium due to another medical condition F05    Musculoskeletal immobility Z74.09    Pyocystis N30.80    Myoclonic jerking G25.3       Isolation/Infection:   Isolation            No Isolation          Patient Infection Status       None to display            Nurse Assessment:  Last Vital Signs: BP (!) 137/47   Pulse 56   Temp 98.4 °F (36.9 °C) (Oral)   Resp 18   Ht 5' 11\" (1.803 m)   Wt (!) 400 lb (181.4 kg)   SpO2 94%   BMI 55.79 kg/m²     Last documented pain score (0-10 scale): Pain Level: 8  Last Weight:   Wt Readings from Last 1 Encounters:   11/08/20 (!) 400 lb (181.4 kg)     Mental Status:  oriented and alert    IV Access:  - None    Nursing Mobility/ADLs:  Walking   Dependent  Transfer  Dependent  Bathing  Dependent  Dressing  Dependent  Toileting  Dependent  Feeding  Independent  Med Admin  Independent  Med Delivery   whole and prefers mixed with yogurt    Wound Care Documentation and Therapy:  Wound 11/04/20 Leg Left; Lower; Posterior (Active)   Wound Image   11/05/20 0843   Wound Etiology Venous 11/05/20 0843   Dressing Status New dressing applied;Clean;Dry; Intact 11/10/20 1106   Wound Cleansed Cleansed with saline 11/10/20 1106   Dressing/Treatment ABD; Ace wrap; Other (comment) 11/10/20 1106   Offloading for Diabetic Foot Ulcers Yes (type) 11/10/20 1106   Dressing Change Due 11/10/20 11/10/20 1106   Wound Length (cm) 1 cm 11/08/20 0900   Wound Width (cm) 1 cm 11/08/20 0900   Wound Depth (cm) 0.2 cm 11/05/20 0843   Wound Surface Area (cm^2) 1 cm^2 11/08/20 0900   Change in Wound Size % (l*w) 75 11/08/20 0900   Wound Volume (cm^3) 0.8 cm^3 11/05/20 0843   Wound Assessment Bleeding;Pink/red 11/10/20 1106   Drainage Amount Scant 11/10/20 1106   Drainage Description Serosanguinous 11/10/20 1106   Odor None 11/10/20 1106   Yokasta-wound Assessment Blanchable erythema;Denuded; Hyperpigmented 11/10/20 1106   Margins Attached edges; Defined edges 11/10/20 1106   Wound Thickness Description not for Pressure Injury Full thickness 11/10/20 1106   Number of days: 6       Wound Nose Left (Active)   Wound Image   11/05/20 0843   Wound Etiology Pressure Stage  3 11/10/20 1106   Dressing Status Clean;Dry; Intact 11/10/20 1106   Wound Cleansed Cleansed with saline 11/10/20 1106   Dressing/Treatment Other (comment) 11/10/20 0915   Dressing Change Due 11/08/20 11/10/20 0915   Wound Length (cm) 0.7 cm 11/05/20 0843   Wound Width (cm) 0.5 cm 11/05/20 0843   Wound Depth (cm) 0.2 cm 11/05/20 0843   Wound Surface Area (cm^2) 0.35 cm^2 11/05/20 0843   Wound Volume (cm^3) 0.07 cm^3 11/05/20 0843   Wound Assessment Pink/red 11/10/20 1106   Drainage Amount Scant 11/10/20 1106   Drainage Description Serosanguinous 11/10/20 1106   Odor None 11/10/20 1106   Yokasta-wound Assessment Blanchable erythema 11/10/20 1106   Margins Attached edges; Defined edges 11/10/20 1106   Wound Thickness Description not for Pressure Injury Full thickness 11/10/20 1106   Number of days:         Elimination:  Continence:   · Bowel: No  · Bladder: No  Urinary Catheter: Indication for Use of Catheter: Urology/Urologist seeing this patient or inserted indwelling catheter   Colostomy/Ileostomy/Ileal Conduit: No       Date of Last BM: 11/10/20    Intake/Output Summary (Last 24 hours) at 11/10/2020 1123  Last data filed at 11/10/2020 0801  Gross per 24 hour   Intake 118 ml   Output 2950 ml   Net -2832 ml     I/O last 3 completed shifts:  In: -   Out: [address] [Urine:[id number]]    Safety Concerns: At Risk for Falls    Impairments/Disabilities:      Vision    Nutrition Therapy:  Current Nutrition Therapy:   - Oral Diet:  Carb Control 4 carbs/meal (1800kcals/day) and Low Sodium (2gm)    Routes of Feeding: Oral  Liquids: No Restrictions  Daily Fluid Restriction: yes - amount 1800 ml/day  Last Modified Barium Swallow with Video (Video Swallowing Test): not done    Treatments at the Time of Hospital Discharge:   Respiratory Treatments: ***  Oxygen Therapy:  is on oxygen at 2 L/min per nasal cannula.   Ventilator:    - BiPAP   IPAP: 18 cmH20, CPAP/EPAP: 8 cmH2O only when sleeping  - CPAP   only when sleeping    Rehab Therapies: Physical Therapy and Occupational Therapy  Weight Bearing Status/Restrictions: No weight bearing restirctions  Other Medical Equipment (for information only, NOT a DME order):  {EQUIPMENT:[id number]}  Other Treatments: ***    Patient's personal belongings (please select all that are sent with patient):  Glasses    RN SIGNATURE:  Electronically signed by Jordan Larson RN on 11/10/20 at 11:25 AM EST    CASE MANAGEMENT/SOCIAL WORK SECTION    Inpatient Status Date: 11/4/20    Readmission Risk Assessment Score:  Readmission Risk              Risk of Unplanned Readmission:        18           Discharging to Facility/ Agency   · Name: [facility]   · Address:  · Phone: [phone]  · Fax:        / signature: Electronically signed by Fabio Wallace RN on 11/10/20 at 1:35 PM EST    PHYSICIAN SECTION    Prognosis: Good    Condition at Discharge: Stable    Rehab Potential (if

## 2020-11-10 NOTE — PROGRESS NOTES
[facility], NEUROLOGY                    NEUROLOGY PROGRESS NOTE    PATIENT NAME: Kathi Huston   PATIENT MRN: [id number]   PRIMARY CARE PHYSICIAN: Andrew Baca MD  DATE OF SERVICE: 11/9/2020    CHIEF COMPLAINT: Altered Mental Status         SUBJECTIVE:    No new issues overnight. Pt's confusion comes and goes but overall improving. Pt knows [place], [place], sarah today. Will be discharged to SNF today. Brief HPI  Moss Crigler is a 63year old male was admitted on 11/4 for confusion. Neurology was consulted on 11/6/2020 because on 11/5 night, he became more lethargic, even put on BiPAP, next morning remained lethargic, not follow commands. He also had diffuse myoclonic jerks There were other suspected causes like klonopin withdrawal.     Pt has chronic indwelling Clemons, and UTI, he has been on antibiotics on this admission, Ux grew E coli,  Enterococcus faecalis, first he was IV meropenem, then Cipro IV,  Abx was discontinued 2 days ago per ID. MRI was ordered, which is done today, unremarkable. MRI brain 11/9/2020   Unremarkable     Ux 11/6: Echo >065263 CFU/ml  Enterococcus faecalis > 136841 CFU/ml     PAST MEDICAL HISTORY:         Diagnosis Date    Anxiety and depression     Back pain, chronic     BPH (benign prostatic hyperplasia)     CHF (congestive heart failure) ([address]     Cirrhosis ([facility]     Diabetes mellitus ([facility] [address]     not checking bloodsugar at home    GERD (gastroesophageal reflux disease)     H/O cardiac catheterization not sure of date    no stents    Hepatitis     Pt does not know the type.      Hiatal hernia     History of alcohol abuse     Sober since 2013    Hyperlipidemia     not taking any medication    Hypertension     IBS (irritable bowel syndrome)     Morbid obesity (HCC)     Neuropathy     feet,toes    On home oxygen therapy     prn    Pancreatitis     x 5 episodes    Primary osteoarthritis of right knee     Sleep apnea     No machine    Thyroid disease     Urinary bladder neurogenic dysfunction        MEDICATIONS:     Current Facility-Administered Medications   Medication Dose Route Frequency Provider Last Rate Last Dose    clonazePAM (KLONOPIN) tablet 1 mg  1 mg Oral BID Tay Mcginnis DO   1 mg at 11/10/20 0903    lidocaine (XYLOCAINE) 2 % uro-jet   Topical PRN Salvador Negro MD        metoprolol (LOPRESSOR) injection 5 mg  5 mg Intravenous Q6H PRN Chelle Joycelyn, APRN - NP   5 mg at 11/06/20 2350    potassium chloride 10 mEq/100 mL IVPB (Peripheral Line)  10 mEq Intravenous PRN Chelle Joycelyn, APRN -  mL/hr at 11/07/20 2204 10 mEq at 11/07/20 2204    0.9 % sodium chloride infusion   Intravenous Continuous Carisa Bess, APRN - NP   Stopped at 11/09/20 1022    LORazepam (ATIVAN) injection 0.5 mg  0.5 mg Intravenous Q6H PRN Carisa Bess, APRN - NP   0.5 mg at 11/10/20 0347    sodium chloride flush 0.9 % injection 10 mL  10 mL Intravenous 2 times per day Chelle Hirsch, APRN - NP        sodium chloride flush 0.9 % injection 10 mL  10 mL Intravenous PRN Carisa Bess, APRN - NP        acetaminophen (TYLENOL) tablet 650 mg  650 mg Oral Q6H PRN Chelle Baea, APRN - NP   650 mg at 11/08/20 6054    Or    acetaminophen (TYLENOL) suppository 650 mg  650 mg Rectal Q6H PRN Carisa Bess, APRN - NP        polyethylene glycol (GLYCOLAX) packet 17 g  17 g Oral Daily PRN Carisa Bess, APRN - NP        promethazine (PHENERGAN) tablet 12.5 mg  12.5 mg Oral Q6H PRN Carisa Bess, APRN - NP        Or    ondansetron (ZOFRAN) injection 4 mg  4 mg Intravenous Q6H PRN Carisa Bess, APRN - NP   4 mg at 11/09/20 2040    nicotine (NICODERM CQ) 21 MG/24HR 1 patch  1 patch Transdermal Daily PRN Carisa Bess, APRN - NP        enoxaparin (LOVENOX) injection 40 mg  40 mg Subcutaneous BID Chelle Joycelyn, APRN - NP   40 mg at 11/10/20 0905    albuterol sulfate  (90 Base) MCG/ACT inhaler 2 puff  2 puff Inhalation Q6H PRN Carisa Kellogg, APRN - NP        aspirin EC tablet 81 mg  81 mg Oral Daily Morgan Lorenzo APRN - NP   81 mg at 11/10/20 9365    tamsulosin (FLOMAX) capsule 0.4 mg  0.4 mg Oral Daily Amber Swearingen, APRN - NP   0.4 mg at 11/10/20 0905    miconazole (MICOTIN) 2 % powder   Topical BID Carisa Kellogg, APRN - NP        pantoprazole (PROTONIX) tablet 40 mg  40 mg Oral QAM AC Carisa Bess, APRN - NP   40 mg at 11/10/20 0603    venlafaxine (EFFEXOR XR) extended release capsule 150 mg  150 mg Oral Daily Morgan Lorenzo APRN - NP   150 mg at 11/10/20 5523    bumetanide (BUMEX) tablet 2 mg  2 mg Oral BID Morgan Lorenzo APRN - NP   2 mg at 11/10/20 7852    bethanechol (URECHOLINE) tablet 10 mg  10 mg Oral TID Morgan Lorenzo APRN - NP   Stopped at 11/05/20 0924    DULoxetine (CYMBALTA) extended release capsule 60 mg  60 mg Oral QAM Carisa Kellogg, APRN - NP   60 mg at 11/10/20 1871    levothyroxine (SYNTHROID) tablet 150 mcg  150 mcg Oral Daily Morgan Lorenzo APRN - NP   150 mcg at 11/10/20 0603    metFORMIN (GLUCOPHAGE) tablet 500 mg  500 mg Oral BID Morgan Lorenzo APRN - NP   500 mg at 11/10/20 0905    nadolol (CORGARD) tablet 40 mg  40 mg Oral Daily Amber SweBess, APRN - NP   40 mg at 11/10/20 0903    pregabalin (LYRICA) capsule 150 mg  150 mg Oral BID Morgandanielle Lorenzo, APRN - NP   150 mg at 11/10/20 0905    oxyCODONE-acetaminophen (PERCOCET) 5-325 MG per tablet 1.5 tablet  1.5 tablet Oral Q6H PRN Morgan Lorenzo APRN - NP   1.5 tablet at 11/10/20 0915    alogliptin (NESINA) tablet 25 mg  25 mg Oral Daily Carisa Bess, APRN - NP   25 mg at 11/10/20 1211    rifaximin (XIFAXAN) tablet 550 mg  550 mg Oral BID Morgandanielle Lorenzo APRN - NP   550 mg at 11/10/20 0905    oxybutynin (DITROPAN) tablet 5 mg  5 mg Oral BID Morgandanielle Lorenzo APRN - NP   5 mg at 11/10/20 0905    naloxegol (MOVANTIK) tablet 25 mg 25 mg Oral QAM Ross Avina APRN - NP   Stopped at 11/05/20 4599    hydrOXYzine (ATARAX) tablet 25 mg  25 mg Oral TID PRN Ross Avina APRN - NP   25 mg at 11/08/20 1120    ibuprofen (ADVIL;MOTRIN) tablet 600 mg  600 mg Oral Q6H PRN Ross Fabrice APRN - NP   600 mg at 11/08/20 0010    spironolactone (ALDACTONE) tablet 75 mg  75 mg Oral BID Ross Avina APRN - NP   75 mg at 11/10/20 0905    potassium chloride (KLOR-CON M) extended release tablet 20 mEq  20 mEq Oral Daily with breakfast Ross Avina APRN - NP   20 mEq at 11/10/20 0905    insulin glargine (LANTUS) injection vial 80 Units  80 Units Subcutaneous BID Carisa KirklandBess, APRN - NP   80 Units at 11/10/20 0909    glucose (GLUTOSE) 40 % oral gel 15 g  15 g Oral PRN Carisa Bess, APRN - NP        dextrose 50 % IV solution  12.5 g Intravenous PRN Carisa Bess, APRN - NP        glucagon (rDNA) injection 1 mg  1 mg Intramuscular PRN Carisa Bess, APRN - NP        dextrose 5 % solution  100 mL/hr Intravenous PRN Carisa Bess, APRN - NP        insulin lispro (HUMALOG) injection vial 0-6 Units  0-6 Units Subcutaneous TID WC Carisa Kellogg APRN - NP   1 Units at 11/10/20 1211    insulin lispro (HUMALOG) injection vial 0-3 Units  0-3 Units Subcutaneous Nightly Carisa Bess, APRN - NP   1 Units at 11/08/20 2111    albuterol (PROVENTIL) nebulizer solution 2.5 mg  2.5 mg Nebulization As Directed RT PRN Génesis Navarrete, APRN - CNP         Current Outpatient Medications   Medication Sig Dispense Refill    oxyCODONE-acetaminophen (PERCOCET) 7.5-325 MG per tablet Take 1 tablet by mouth every 6 hours as needed for Pain for up to 3 days. 12 tablet 0    acetaminophen (TYLENOL) 325 MG tablet Take 2 tablets by mouth every 6 hours as needed for Pain or Fever 120 tablet 3    pregabalin (LYRICA) 150 MG capsule Take 1 capsule by mouth 2 times daily for 3 days.  6 capsule 0    clonazePAM (KLONOPIN) 1 MG tablet Take 1 mouth 2 times daily       ibuprofen (ADVIL;MOTRIN) 600 MG tablet Take 600 mg by mouth every 6 hours as needed for Pain      aspirin 81 MG EC tablet Take 1 tablet by mouth daily 30 tablet 3    SITagliptin (JANUVIA) 100 MG tablet Take 1 tablet by mouth daily 30 tablet 3    nadolol (CORGARD) 40 MG tablet Take 40 mg by mouth daily      levothyroxine (SYNTHROID) 175 MCG tablet Take 175 mcg by mouth Daily       nystatin (MYCOSTATIN) 239350 UNIT/GM powder Apply topically 2 times daily as needed TO ABDOMINAL FOLDS, GROIN       esomeprazole (NEXIUM) 40 MG capsule Take 40 mg by mouth 2 times daily       DULoxetine (CYMBALTA) 60 MG capsule Take 60 mg by mouth every morning       venlafaxine (EFFEXOR-XR) 150 MG XR capsule Take 150 mg by mouth daily.           ALLERGIES:     Allergies   Allergen Reactions    No Known Allergies        REVIEW OF SYSTEMS:     Could not be reliably reviewed due to mental status    VITALS  BP (!) 119/36   Pulse 57   Temp 97.7 °F (36.5 °C) (Oral)   Resp 18   Ht 5' 11\" (1.803 m)   Wt (!) 400 lb (181.4 kg)   SpO2 94%   BMI 55.79 kg/m²       PHYSICAL EXAMINATION:       General Appearance:  Alert, cooperative, no signs of distress, appears stated age, obese   Skin:  No rash or lesions   HEENT:  Normocephalic, conjunctiva/corneas clear; eyelids intact   Ears:  Normal external ear and canals   Nose: Nares normal, mucosa normal, no drainage    Throat: Lips and tongue normal; teeth normal;  gums normal   Neck: Supple, intact flexion, extension and rotation;   trachea midline;  no adenopathy;   thyroid: not enlarged;   no carotid pulse abnormality   Lungs:   Respirations unlabored   Heart: Regular rate and rhythm   Abdominal: BS present, soft, NT, ND   Extremities: Edema in both lower legs   Psych Normal affect      NEUROLOGICAL EXAMINATION:      Mental status    Awake, alert and oriented to hospital, self, not year/president   Cranial nerves    II - visual fields intact to confrontation III, IV, VI - PERRLA, EOMI, no pupillary defect; no MYRON, no nystagmus, no ptosis   V - normal facial sensation                                                               VII - normal facial symmetry                                                             VIII - intact hearing                                                                             IX, X - symmetrical palate                                                                  XI - symmetrical shoulder shrug                                                       XII - midline tongue without atrophy or fasciculation      Motor function  No abnormal movements; tone and bulk okay  Moves all extremities but much less on both legs 3/5; arms 4/5. Coordination FNF no dysmetria, heel to shin not able perform    Reflex function Trace reflex in arms, could not induce in LEs,       Gait                  Not testable     Sensory function Withdrawal to pain in all        LABS & TESTS:     LABS & TESTS  Lab Results   Component Value Date    WBC 9.3 11/07/2020    HGB 11.7 (L) 11/07/2020    HCT 39.5 (L) 11/07/2020    MCV 86.2 11/07/2020     (L) 11/07/2020       ASSESSMENT:   1. Encephalopathy, metabolic, continue improving, MRI no stroke  2. UTI  3. Hypothyroidism TSH 10.4, free T4 0.88    PLANS:   1. Continue monitoring, if gets worse, consider EEG  2. Optimal BP, BG, LDL control, A1c 8.1. ; B12 WNL  3. Hypothyroidism, on synthroid, monitor per primary team       Thank you for this interesting consult, patient will be discharged to SNF today, neurology is signing off, please call with any questions.      Brigida Cagle MD  Neurology Attending  Clinic: [phone]

## 2020-11-10 NOTE — PROGRESS NOTES
(gastroesophageal reflux disease), H/O cardiac catheterization, Hepatitis, Hiatal hernia, History of alcohol abuse, Hyperlipidemia, Hypertension, IBS (irritable bowel syndrome), Morbid obesity ([address] Neuropathy, On home oxygen therapy, Pancreatitis, Primary osteoarthritis of right knee, Sleep apnea, Thyroid disease, and Urinary bladder neurogenic dysfunction. has a past surgical history that includes Colonoscopy; Abdomen surgery; hernia repair; Endoscopy, colon, diagnostic; Upper gastrointestinal endoscopy (07/19/2017); pr egd transoral biopsy single/multiple (N/A, 7/19/2017); pr deep dissec foot infec,1 bursa (Bilateral, 8/22/2017); Cystoscopy (01/24/2018); pr cystourethroscopy (N/A, 1/24/2018); Incisional hernia repair (05/20/2018); ventral hernia repair (N/A, 5/20/2018); Cystocopy (02/20/2019); Cystoscopy (N/A, 2/20/2019); Upper gastrointestinal endoscopy (N/A, 3/14/2019); and Cystoscopy (N/A, 8/23/2019).            Restrictions  Restrictions/Precautions  Restrictions/Precautions: General Precautions, Fall Risk  Position Activity Restriction  Other position/activity restrictions: IV, wells, telemetry, confusion    Subjective   General  Chart Reviewed: Yes  Patient assessed for rehabilitation services?: Yes  Family / Caregiver Present: No  Patient Currently in Pain: No  Pain Assessment  Response to Pain Intervention: Asleep with RR greater than 10  Vital Signs  Temp: 97.7 °F (36.5 °C)  Temp Source: Oral  Pulse: 57  Heart Rate Source: Monitor  Resp: 18  BP: (!) 119/36  BP Location: Left Arm  BP Upper/Lower: Lower  MAP (mmHg): (!) 55  Level of Consciousness: Alert  MEWS Score: 1  Patient Currently in Pain: No  Oxygen Therapy  SpO2: 94 %  O2 Device: Nasal cannula  O2 Flow Rate (L/min): 2 L/min  Social/Functional History  Social/Functional History  Lives With: Son  Type of Home: House  Home Layout: One level  Home Access: Ramped entrance  Bathroom Shower/Tub: (sponge bathes)  Bathroom Toilet: Handicap height  Home Equipment: Rolling walker, Wheelchair-manual, Oxygen(2-3 L O2 mostly continuous, but sometimes just at night and while napping.)  Receives Help From: Family, Home health([facility] 7 days a week, 4 hours/day.)  ADL Assistance: Needs assistance(pt has assist for all ADLs. Sponge bathes, assist for dressing. Has a wells. States he can take himself to bathroom for BM? ? )  Homemaking Assistance: Needs assistance  Ambulation Assistance: Independent(questionable. States he uses walker in home, w/c to appointments)  Transfer Assistance: Independent(has a lift chair, handicapped toilet.)  Active : No  Patient's  Info: takes transportation service-goes in w/c  Leisure & Hobbies: watches TV  Additional Comments: Patient has difficulty answering PLOF questions. Per chart patient lives with his son and has home health. Patient can typically ambute to bathroom and back. . Pt reports having falls at home (a couple prior to admission), sleeps in lift chair, states he likes to have someone with him anytime he is up       Objective   Vision: Impaired  Vision Exceptions: Wears glasses at all times  Hearing: Within functional limits    Orientation  Overall Orientation Status: Impaired  Orientation Level: Oriented to person;Oriented to place(knows month, not date. Mostly knows why he is here, but dec. insight.)     Balance  Sitting Balance: Unable to assess(comment)(pt unable to tolerate sup to sit this session. Pt minimally able to roll in bed with max A x 2.)  Standing Balance: Unable to assess(comment)  Functional Mobility  Functional Mobility Comments: unable to tolerate sitting/standing/mob this session. Will continue to assess  ADL  Feeding: Setup;Supervision  Grooming: Minimal assistance; Moderate assistance  UE Bathing: Maximum assistance  LE Bathing: Dependent/Total  UE Dressing: Maximum assistance  LE Dressing: Dependent/Total  Toileting: Dependent/Total  Additional Comments: pt unable to sit EOB this session and pt needing max A x 2 to roll in bed. Pt max A-D for all bed level bathing and dressing. Tone RUE  RUE Tone: Normotonic  Tone LUE  LUE Tone: Normotonic  Coordination  Movements Are Fluid And Coordinated: Yes     Bed mobility  Bridging: Dependent/Total  Rolling to Left: Maximum assistance;2 Person assistance  Rolling to Right: Maximum assistance;2 Person assistance  Supine to Sit: Unable to assess  Sit to Supine: Unable to assess  Scooting: Dependent/Total  Comment: pt needing max cues and max physical assist x 2 to roll both directions. Pt needing cues and assist to reach for bed rail and to maintain  on rail. Pt needing max x 2 to assist with bending knees as well to assist with rollling. Pt putting questionable effort forth and needing a lot of encouragement  Transfers  Sit to stand: Unable to assess  Stand to sit: Unable to assess     Cognition  Arousal/Alertness: Inconsistent responses to stimuli;Delayed responses to stimuli  Following Commands: Inconsistently follows commands; Follows one step commands with repetition; Follows one step commands with increased time  Attention Span: Difficulty attending to directions; Attends with cues to redirect  Memory: Decreased recall of biographical Information;Decreased recall of recent events  Safety Judgement: Decreased awareness of need for assistance;Decreased awareness of need for safety  Problem Solving: Decreased awareness of errors;Assistance required to identify errors made;Assistance required to generate solutions;Assistance required to implement solutions;Assistance required to correct errors made  Insights: Decreased awareness of deficits  Initiation: Requires cues for all  Sequencing: Requires cues for all  Perception  Overall Perceptual Status: WFL              LUE AROM (degrees)  LUE AROM : WFL  LUE General AROM: AAROM B UE's WFLs.  Needing physical cues to complete full ROM  RUE AROM (degrees)  RUE AROM : WFL  LUE Strength  Gross LUE Strength: Exceptions to [facility]  LUE Strength Comment: B shoulders 3-/5, elbows/wrist/hand 3+/5  RUE Strength  Gross RUE Strength: Exceptions to [address]  Times per week: 4-5x/week, 1-2/xday  Current Treatment Recommendations: Strengthening, Balance Training, Functional Mobility Training, Endurance Training, Safety Education & Training, Self-Care / ADL, Patient/Caregiver Education & Training, Equipment Evaluation, Education, & procurement, Positioning      Goals  Short term goals  Time Frame for Short term goals: by discharge, pt will  Short term goal 1: tolerate reasssessment of ADL transfers with approp equip  Short term goal 2: demo mod x 2 for bed mob inc.sup to sit with rails/controls  Short term goal 3: demo CGA for sitting balance EOB for inc. ADL completion  Short term goal 4: participate in B UE HEP for inc. strength for ADLs/bed mob/tranfers  Short term goal 5: demo and verb good understanding of fall prevention techs, EC/WS techs, d/c recommendations, and possible equip needs  Patient Goals   Patient goals : not stated       Therapy Time   Individual Concurrent Group Co-treatment   Time In 1002         Time Out 1050         Minutes 48              Patient would benefit from SNF for continued occupational therapy to increase independence with  ADL of bathing, dressing, toileting and grooming. Writer recommending SNF placement for for activity tolerance and strength which will increase independence with ADL's coordinated with bed mobility and chair transfers. Continued skilled OT services to address decreased safety awareness with ADL and IADL tasks and for education and increased independence with DME and AE for fall prevention and ec/ws techniques prior to d/c home. Co-treatment with PT warranted secondary to decreased safety and independence requiring 2 skilled therapy professionals to address individual discipline's goals.  OT addressing preparation for ADL transfer, functional reaching, environmental safety/scanning, fall prevention, ability to sequence and follow directions and functional UE strength.     Donna Sierra, OT

## 2020-11-10 NOTE — CARE COORDINATION
Social work: Phone call from [facility], patient current with them for RN and aides 3 hrs/7 days a week). Writer informed [facility] plan will likely be snf at [place]. If pt goes home, contact bleed at [phone] to inform of dc.

## 2020-11-10 NOTE — PROGRESS NOTES
KAROLYN and BIPAP order faxed to [facility]. Report called to Mono Osborne at [facility] # [phone].

## 2020-11-10 NOTE — PROGRESS NOTES
Voicemail left with son, Yasmani Serna, regarding discharge to SNF. Call to son, Margo, updated on discharge to SNF. Call transferred to patient room for patient and son to talk amongst themselves.

## 2020-11-24 NOTE — PROGRESS NOTES
Physician Progress Note      Coco Walker  CSN #:                  [id number]  :                       1965  ADMIT DATE:       2020 11:46 AM  DISCH DATE:        11/10/2020 2:09 PM  RESPONDING  PROVIDER #:        Everton DARNELL DO          QUERY TEXT:    Patient admitted with AMS. Documentation reflects UTI due to wells, however in   DC summary noted culture was consistent with colonization. If possible,   please document in the progress notes and discharge summary if UTI due to   wells was: The medical record reflects the following:  Risk Factors: indwelling wells  Clinical Indicators: confusion that was thought to be d/t UTI, ID note states   bacteriuria and DC summary notes culture was more consistent with colonization  Treatment: IV antibiotics for part of the stay; however, were [address]    Thank you,  Caryl Dudley RN CDI Supervisor  Options provided:  -- UTI due to wells ruled out after study  -- UTI due to wells treated and resolved  -- UTI due to wells confirmed after study  -- Other - I will add my own diagnosis  -- Disagree - Not applicable / Not valid  -- Disagree - Clinically unable to determine / Unknown  -- Refer to Clinical Documentation Reviewer    PROVIDER RESPONSE TEXT:    UTI due to wells ruled out after study.     Query created by: Dianne Brunner on 2020 6:56 AM      Electronically signed by:  Bijan Rodríguez DO 2020 6:33 AM

## 2021-01-12 ENCOUNTER — HOSPITAL ENCOUNTER (INPATIENT)
Age: 56
LOS: 1 days | Discharge: NURSING, REHAB OR LONG-TERM CARE | DRG: 918 | End: 2021-01-15
Attending: EMERGENCY MEDICINE | Admitting: FAMILY MEDICINE
Payer: MEDICARE

## 2021-01-12 DIAGNOSIS — E16.2 HYPOGLYCEMIA: Primary | ICD-10-CM

## 2021-01-12 DIAGNOSIS — R53.1 GENERALIZED WEAKNESS: ICD-10-CM

## 2021-01-12 DIAGNOSIS — F32.A ANXIETY AND DEPRESSION: ICD-10-CM

## 2021-01-12 DIAGNOSIS — F41.9 ANXIETY AND DEPRESSION: ICD-10-CM

## 2021-01-12 LAB
ABSOLUTE EOS #: 0.16 K/UL (ref 0–0.44)
ABSOLUTE IMMATURE GRANULOCYTE: 0.08 K/UL (ref 0–0.3)
ABSOLUTE LYMPH #: 0.98 K/UL (ref 1.1–3.7)
ABSOLUTE MONO #: 0.62 K/UL (ref 0.1–1.2)
ANION GAP SERPL CALCULATED.3IONS-SCNC: 12 MMOL/L (ref 9–17)
BASOPHILS # BLD: 0 % (ref 0–2)
BASOPHILS ABSOLUTE: 0.04 K/UL (ref 0–0.2)
BUN BLDV-MCNC: 54 MG/DL (ref 6–20)
BUN/CREAT BLD: 35 (ref 9–20)
CALCIUM SERPL-MCNC: 9.2 MG/DL (ref 8.6–10.4)
CHLORIDE BLD-SCNC: 101 MMOL/L (ref 98–107)
CHP ED QC CHECK: YES
CO2: 24 MMOL/L (ref 20–31)
CREAT SERPL-MCNC: 1.53 MG/DL (ref 0.7–1.2)
DIFFERENTIAL TYPE: ABNORMAL
EOSINOPHILS RELATIVE PERCENT: 2 % (ref 1–4)
GFR AFRICAN AMERICAN: 57 ML/MIN
GFR NON-AFRICAN AMERICAN: 47 ML/MIN
GFR SERPL CREATININE-BSD FRML MDRD: ABNORMAL ML/MIN/{1.73_M2}
GFR SERPL CREATININE-BSD FRML MDRD: ABNORMAL ML/MIN/{1.73_M2}
GLUCOSE BLD-MCNC: 105 MG/DL (ref 75–110)
GLUCOSE BLD-MCNC: 107 MG/DL (ref 70–99)
GLUCOSE BLD-MCNC: 54 MG/DL (ref 75–110)
GLUCOSE BLD-MCNC: 69 MG/DL (ref 75–110)
GLUCOSE BLD-MCNC: 75 MG/DL (ref 75–110)
GLUCOSE BLD-MCNC: 93 MG/DL (ref 75–110)
GLUCOSE BLD-MCNC: 94 MG/DL
HCT VFR BLD CALC: 39.4 % (ref 40.7–50.3)
HEMOGLOBIN: 11.8 G/DL (ref 13–17)
IMMATURE GRANULOCYTES: 1 %
LYMPHOCYTES # BLD: 10 % (ref 24–43)
MCH RBC QN AUTO: 25.8 PG (ref 25.2–33.5)
MCHC RBC AUTO-ENTMCNC: 29.9 G/DL (ref 28.4–34.8)
MCV RBC AUTO: 86 FL (ref 82.6–102.9)
MONOCYTES # BLD: 6 % (ref 3–12)
NRBC AUTOMATED: 0 PER 100 WBC
PDW BLD-RTO: 16.9 % (ref 11.8–14.4)
PLATELET # BLD: 91 K/UL (ref 138–453)
PLATELET ESTIMATE: ABNORMAL
PMV BLD AUTO: 10.4 FL (ref 8.1–13.5)
POTASSIUM SERPL-SCNC: 4.6 MMOL/L (ref 3.7–5.3)
RBC # BLD: 4.58 M/UL (ref 4.21–5.77)
RBC # BLD: ABNORMAL 10*6/UL
SEG NEUTROPHILS: 81 % (ref 36–65)
SEGMENTED NEUTROPHILS ABSOLUTE COUNT: 8.07 K/UL (ref 1.5–8.1)
SODIUM BLD-SCNC: 137 MMOL/L (ref 135–144)
WBC # BLD: 10 K/UL (ref 3.5–11.3)
WBC # BLD: ABNORMAL 10*3/UL

## 2021-01-12 PROCEDURE — 2580000003 HC RX 258: Performed by: NURSE PRACTITIONER

## 2021-01-12 PROCEDURE — G0378 HOSPITAL OBSERVATION PER HR: HCPCS

## 2021-01-12 PROCEDURE — 96374 THER/PROPH/DIAG INJ IV PUSH: CPT

## 2021-01-12 PROCEDURE — 93005 ELECTROCARDIOGRAM TRACING: CPT | Performed by: EMERGENCY MEDICINE

## 2021-01-12 PROCEDURE — 82947 ASSAY GLUCOSE BLOOD QUANT: CPT

## 2021-01-12 PROCEDURE — 80048 BASIC METABOLIC PNL TOTAL CA: CPT

## 2021-01-12 PROCEDURE — 99219 PR INITIAL OBSERVATION CARE/DAY 50 MINUTES: CPT | Performed by: NURSE PRACTITIONER

## 2021-01-12 PROCEDURE — 99283 EMERGENCY DEPT VISIT LOW MDM: CPT

## 2021-01-12 PROCEDURE — 85025 COMPLETE CBC W/AUTO DIFF WBC: CPT

## 2021-01-12 PROCEDURE — 6370000000 HC RX 637 (ALT 250 FOR IP): Performed by: NURSE PRACTITIONER

## 2021-01-12 RX ORDER — SODIUM CHLORIDE 0.9 % (FLUSH) 0.9 %
10 SYRINGE (ML) INJECTION EVERY 12 HOURS SCHEDULED
Status: DISCONTINUED | OUTPATIENT
Start: 2021-01-12 | End: 2021-01-15 | Stop reason: HOSPADM

## 2021-01-12 RX ORDER — NICOTINE POLACRILEX 4 MG
15 LOZENGE BUCCAL PRN
Status: DISCONTINUED | OUTPATIENT
Start: 2021-01-12 | End: 2021-01-15 | Stop reason: HOSPADM

## 2021-01-12 RX ORDER — OXYBUTYNIN CHLORIDE 5 MG/1
5 TABLET ORAL 2 TIMES DAILY
Status: DISCONTINUED | OUTPATIENT
Start: 2021-01-12 | End: 2021-01-15 | Stop reason: HOSPADM

## 2021-01-12 RX ORDER — NICOTINE 21 MG/24HR
1 PATCH, TRANSDERMAL 24 HOURS TRANSDERMAL DAILY PRN
Status: DISCONTINUED | OUTPATIENT
Start: 2021-01-12 | End: 2021-01-14

## 2021-01-12 RX ORDER — DEXTROSE AND SODIUM CHLORIDE 5; .45 G/100ML; G/100ML
INJECTION, SOLUTION INTRAVENOUS CONTINUOUS
Status: DISCONTINUED | OUTPATIENT
Start: 2021-01-13 | End: 2021-01-13

## 2021-01-12 RX ORDER — LEVOTHYROXINE SODIUM 175 UG/1
175 TABLET ORAL DAILY
Status: DISCONTINUED | OUTPATIENT
Start: 2021-01-13 | End: 2021-01-15 | Stop reason: HOSPADM

## 2021-01-12 RX ORDER — ONDANSETRON 2 MG/ML
4 INJECTION INTRAMUSCULAR; INTRAVENOUS EVERY 6 HOURS PRN
Status: DISCONTINUED | OUTPATIENT
Start: 2021-01-12 | End: 2021-01-15 | Stop reason: HOSPADM

## 2021-01-12 RX ORDER — ASPIRIN 81 MG/1
81 TABLET ORAL DAILY
Status: DISCONTINUED | OUTPATIENT
Start: 2021-01-13 | End: 2021-01-15 | Stop reason: HOSPADM

## 2021-01-12 RX ORDER — QUETIAPINE FUMARATE 50 MG/1
50 TABLET, EXTENDED RELEASE ORAL NIGHTLY
Status: DISCONTINUED | OUTPATIENT
Start: 2021-01-12 | End: 2021-01-15 | Stop reason: HOSPADM

## 2021-01-12 RX ORDER — SODIUM CHLORIDE 0.9 % (FLUSH) 0.9 %
10 SYRINGE (ML) INJECTION PRN
Status: DISCONTINUED | OUTPATIENT
Start: 2021-01-12 | End: 2021-01-15 | Stop reason: HOSPADM

## 2021-01-12 RX ORDER — POLYETHYLENE GLYCOL 3350 17 G/17G
17 POWDER, FOR SOLUTION ORAL DAILY PRN
Status: DISCONTINUED | OUTPATIENT
Start: 2021-01-12 | End: 2021-01-15 | Stop reason: HOSPADM

## 2021-01-12 RX ORDER — ACETAMINOPHEN 325 MG/1
650 TABLET ORAL EVERY 6 HOURS PRN
Status: DISCONTINUED | OUTPATIENT
Start: 2021-01-12 | End: 2021-01-12

## 2021-01-12 RX ORDER — ALBUTEROL SULFATE 90 UG/1
2 AEROSOL, METERED RESPIRATORY (INHALATION) EVERY 6 HOURS PRN
Status: DISCONTINUED | OUTPATIENT
Start: 2021-01-12 | End: 2021-01-15 | Stop reason: HOSPADM

## 2021-01-12 RX ORDER — POTASSIUM CHLORIDE 20 MEQ/1
40 TABLET, EXTENDED RELEASE ORAL PRN
Status: DISCONTINUED | OUTPATIENT
Start: 2021-01-12 | End: 2021-01-15 | Stop reason: HOSPADM

## 2021-01-12 RX ORDER — SPIRONOLACTONE 25 MG/1
75 TABLET ORAL 2 TIMES DAILY
Status: DISCONTINUED | OUTPATIENT
Start: 2021-01-12 | End: 2021-01-13

## 2021-01-12 RX ORDER — NADOLOL 20 MG/1
40 TABLET ORAL DAILY
Status: DISCONTINUED | OUTPATIENT
Start: 2021-01-13 | End: 2021-01-15 | Stop reason: HOSPADM

## 2021-01-12 RX ORDER — TAMSULOSIN HYDROCHLORIDE 0.4 MG/1
0.4 CAPSULE ORAL DAILY
Status: DISCONTINUED | OUTPATIENT
Start: 2021-01-13 | End: 2021-01-15 | Stop reason: HOSPADM

## 2021-01-12 RX ORDER — PANTOPRAZOLE SODIUM 40 MG/1
40 TABLET, DELAYED RELEASE ORAL
Status: DISCONTINUED | OUTPATIENT
Start: 2021-01-13 | End: 2021-01-15 | Stop reason: HOSPADM

## 2021-01-12 RX ORDER — ACETAMINOPHEN 650 MG/1
650 SUPPOSITORY RECTAL EVERY 6 HOURS PRN
Status: DISCONTINUED | OUTPATIENT
Start: 2021-01-12 | End: 2021-01-15 | Stop reason: HOSPADM

## 2021-01-12 RX ORDER — ALOGLIPTIN 12.5 MG/1
12.5 TABLET, FILM COATED ORAL DAILY
Status: DISCONTINUED | OUTPATIENT
Start: 2021-01-13 | End: 2021-01-14

## 2021-01-12 RX ORDER — ACETAMINOPHEN 325 MG/1
650 TABLET ORAL EVERY 6 HOURS PRN
Status: DISCONTINUED | OUTPATIENT
Start: 2021-01-12 | End: 2021-01-15 | Stop reason: HOSPADM

## 2021-01-12 RX ORDER — DIPHENOXYLATE HYDROCHLORIDE AND ATROPINE SULFATE 2.5; .025 MG/1; MG/1
1 TABLET ORAL 4 TIMES DAILY PRN
Status: DISCONTINUED | OUTPATIENT
Start: 2021-01-12 | End: 2021-01-13

## 2021-01-12 RX ORDER — PROMETHAZINE HYDROCHLORIDE 25 MG/1
12.5 TABLET ORAL EVERY 6 HOURS PRN
Status: DISCONTINUED | OUTPATIENT
Start: 2021-01-12 | End: 2021-01-15 | Stop reason: HOSPADM

## 2021-01-12 RX ORDER — DEXTROSE MONOHYDRATE 50 MG/ML
100 INJECTION, SOLUTION INTRAVENOUS PRN
Status: DISCONTINUED | OUTPATIENT
Start: 2021-01-12 | End: 2021-01-15 | Stop reason: HOSPADM

## 2021-01-12 RX ORDER — MAGNESIUM SULFATE 1 G/100ML
1 INJECTION INTRAVENOUS PRN
Status: DISCONTINUED | OUTPATIENT
Start: 2021-01-12 | End: 2021-01-15 | Stop reason: HOSPADM

## 2021-01-12 RX ORDER — DEXTROSE MONOHYDRATE 25 G/50ML
12.5 INJECTION, SOLUTION INTRAVENOUS PRN
Status: DISCONTINUED | OUTPATIENT
Start: 2021-01-12 | End: 2021-01-15 | Stop reason: HOSPADM

## 2021-01-12 RX ORDER — POTASSIUM CHLORIDE 7.45 MG/ML
10 INJECTION INTRAVENOUS PRN
Status: DISCONTINUED | OUTPATIENT
Start: 2021-01-12 | End: 2021-01-15 | Stop reason: HOSPADM

## 2021-01-12 RX ADMIN — DEXTROSE AND SODIUM CHLORIDE: 5; 450 INJECTION, SOLUTION INTRAVENOUS at 23:41

## 2021-01-12 RX ADMIN — QUETIAPINE FUMARATE 50 MG: 50 TABLET, EXTENDED RELEASE ORAL at 23:41

## 2021-01-12 RX ADMIN — DEXTROSE MONOHYDRATE 12.5 G: 25 INJECTION, SOLUTION INTRAVENOUS at 23:32

## 2021-01-12 ASSESSMENT — PAIN SCALES - GENERAL: PAINLEVEL_OUTOF10: 2

## 2021-01-12 NOTE — ED NOTES
Bed: 24  Expected date: 1/12/21  Expected time: 5:43 PM  [facility]  [facility]  [facility]     [facility]  01/12/21 1235

## 2021-01-12 NOTE — ED NOTES
Pt to ed via ems with c/o hypoglycemia. Per ems pt was at home and felt like his blood sugar was low. Pt son gave pt 74 units of insulin by mistake. Was given amp of dextrose per ems. Pt arrives to ed awake but slow to answer. Pt is answering questions appropriately. Skin warm and dry. resp even and non-labored. Pt denies chest pain. Pt denies feeling lightheaded or dizzy.       Brian Yarbrough RN  01/12/21 7024

## 2021-01-12 NOTE — ED PROVIDER NOTES
[address] [place] [address] ED  EMERGENCY DEPARTMENT ENCOUNTER      Pt Name: Siomara Scott  MRN: [id number]  [place] 1965  Date of evaluation: 1/12/2021  Provider: Mikie Mendoza MD    CHIEF COMPLAINT       Chief Complaint   Patient presents with    Hypoglycemia         HISTORY OF PRESENT ILLNESS  (Location/Symptom, Timing/Onset, Context/Setting, Quality, Duration, Modifying Factors, Severity.)   Siomara Scott is a 63 y.o. male who presents to the emergency department apparently for low blood sugar. The patient does not understand why he is here. Apparently he did not feel good and thought his blood sugar might be low and his son gave him insulin instead of sugar. Paramedics gave him an amp of D50 after finding a low blood sugar and he seemed to improve. No family members are here initially. Nursing Notes were reviewed. ALLERGIES     No known allergies    CURRENT MEDICATIONS       Previous Medications    ACETAMINOPHEN (TYLENOL) 325 MG TABLET    Take 2 tablets by mouth every 6 hours as needed for Pain or Fever    ALBUTEROL SULFATE HFA (PROAIR HFA) 108 (90 BASE) MCG/ACT INHALER    Inhale 2 puffs into the lungs every 6 hours as needed for Wheezing    ASPIRIN 81 MG EC TABLET    Take 1 tablet by mouth daily    BETHANECHOL (URECHOLINE) 10 MG TABLET    Take 1 tablet by mouth 3 times daily    BUMETANIDE (BUMEX) 2 MG TABLET    Take 2 mg by mouth 2 times daily    CLONAZEPAM (KLONOPIN) 1 MG TABLET    Take 1 tablet by mouth 2 times daily as needed for Anxiety for up to 3 days. DIPHENOXYLATE-ATROPINE (LOMOTIL) 2.5-0.025 MG PER TABLET    Take 1 tablet by mouth 4 times daily as needed for Diarrhea.     DULOXETINE (CYMBALTA) 60 MG CAPSULE    Take 60 mg by mouth every morning     ESOMEPRAZOLE (NEXIUM) 40 MG CAPSULE    Take 40 mg by mouth 2 times daily     FLUOCINONIDE (LIDEX) 0.05 % EXTERNAL SOLUTION    Apply topically as needed (scalp itching)    GLIPIZIDE (GLUCOTROL) 10 MG TABLET    Take 10 mg by mouth 2 times daily (before meals)    HYDROXYZINE (ATARAX) 25 MG TABLET    Take 25 mg by mouth 3 times daily as needed for Itching    IBUPROFEN (ADVIL;MOTRIN) 600 MG TABLET    Take 600 mg by mouth every 6 hours as needed for Pain    INSULIN GLARGINE (BASAGLAR KWIKPEN SC)    Inject 76 Units into the skin 2 times daily     INSULIN LISPRO (HUMALOG) 100 UNIT/ML INJECTION VIAL    Inject 0-3 Units into the skin nightly    INSULIN LISPRO (HUMALOG) 100 UNIT/ML INJECTION VIAL    Inject 0-6 Units into the skin 3 times daily (with meals)    KETOCONAZOLE (NIZORAL) 2 % SHAMPOO    Apply topically Twice a Week    LEVOTHYROXINE (SYNTHROID) 175 MCG TABLET    Take 175 mcg by mouth Daily     METFORMIN (GLUCOPHAGE) 500 MG TABLET    Take 500 mg by mouth 2 times daily     NADOLOL (CORGARD) 40 MG TABLET    Take 40 mg by mouth daily    NYSTATIN (MYCOSTATIN) 888794 UNIT/GM POWDER    Apply topically 2 times daily as needed TO ABDOMINAL FOLDS, GROIN     OXYBUTYNIN (DITROPAN) 5 MG TABLET    Take 1 tablet by mouth 2 times daily    POTASSIUM CHLORIDE (KLOR-CON) 10 MEQ EXTENDED RELEASE TABLET    Take 20 mEq by mouth daily (with breakfast) Takes 2 tabs (=20mEq) daily    PREGABALIN (LYRICA) 150 MG CAPSULE    Take 1 capsule by mouth 2 times daily for 3 days. QUETIAPINE (SEROQUEL XR) 50 MG EXTENDED RELEASE TABLET    Take 50 mg by mouth nightly    RIFAXIMIN (XIFAXAN) 550 MG TABLET    Take 1 tablet by mouth 2 times daily    SITAGLIPTIN (JANUVIA) 100 MG TABLET    Take 1 tablet by mouth daily    SPIRONOLACTONE (ALDACTONE) 25 MG TABLET    Take 3 tablets by mouth 2 times daily Indications: pt takes 25mg and 50mg tab to equal 75mg BID    TAMSULOSIN (FLOMAX) 0.4 MG CAPSULE    Take 1 capsule by mouth daily    TRIAMCINOLONE (KENALOG) 0.025 % CREAM    Apply topically 2 times daily as needed    VENLAFAXINE (EFFEXOR-XR) 150 MG XR CAPSULE    Take 150 mg by mouth daily.        PAST MEDICAL HISTORY         Diagnosis Date    Anxiety and depression     Back pain, chronic     BPH (benign prostatic hyperplasia)     CHF (congestive heart failure) (HCC)     [facility]     Diabetes mellitus ([facility]     not checking bloodsugar at home    GERD (gastroesophageal reflux disease)     H/O cardiac catheterization not sure of date    no stents    Hepatitis     Pt does not know the type.      Hiatal hernia     History of alcohol abuse     Sober since 2013    Hyperlipidemia     not taking any medication    Hypertension     IBS (irritable bowel syndrome)     Morbid obesity ([facility]     Neuropathy     feet,toes    On home oxygen therapy     prn    Pancreatitis     x 5 episodes    Primary osteoarthritis of right knee     Sleep apnea     No machine    Thyroid disease     Urinary bladder neurogenic dysfunction        SURGICAL HISTORY           Procedure Laterality Date    ABDOMEN SURGERY      hernia repair x4 (ventral)    COLONOSCOPY      2012    CYSTOSCOPY  01/24/2018    CYSTOSCOPY  02/20/2019    CYSTOSCOPY N/A 2/20/2019    CYSTOSCOPY DILATION performed by Suresh Acevedo MD at [address] [place] [address] 8/23/2019    CYSTOSCOPY/ DILATION NICOLE CATHETER EXCHANGE performed by Suresh Acevedo MD at [address], COLON, DIAGNOSTIC     [address]  05/20/2018    repair and reduction of recurrent incarcerated incisional hernia with mesh    [place] CYSTOURETHROSCOPY N/A 1/24/2018    CYSTOSCOPY Waylan Shave performed by Suresh Acevedo MD at [address] Bilateral 8/22/2017    FOOT [address] [facility] with bone bx performed by Rosita Stephens DPM at [address] [facility] EGD TRANSORAL BIOPSY SINGLE/MULTIPLE N/A 7/19/2017    EGD BIOPSY performed by Yanna Lundberg MD at [address]  07/19/2017    polypectomy    UPPER GASTROINTESTINAL ENDOSCOPY N/A 3/14/2019    EGD BIOPSY performed by Karthikeyan Quintanilla MD at [facility] N/A 5/20/2018    REPAIR AND REDUCTION OF RECURRENT INCARCERATED INCISIONAL HERNIA WITH MESH performed by Sagar Guerrero MD at [place] [address]           Adopted: Yes     No family status information on file. SOCIAL HISTORY      reports that he has never smoked. He has never used smokeless tobacco. He reports that he does not drink alcohol or use drugs. REVIEW OF SYSTEMS    (2-9 systems for level 4, 10 or more for level 5)     Review of Systems   Unable to perform ROS: Mental status change        Except as noted above the remainder of the review of systems was reviewed and negative. PHYSICAL EXAM    (up to 7 for level 4, 8 or more for level 5)     Vitals:    01/12/21 1850 01/12/21 1905 01/12/21 1920 01/12/21 1935   BP: (!) 123/55 (!) 116/57 124/61 123/67   Pulse: 54 53 54 54   Resp: 17 14 14 13   Temp:       TempSrc:       SpO2: 94% 94% 98% 99%   Weight:           Physical Exam  Constitutional:       General: He is not in acute distress. Appearance: He is well-developed. He is not diaphoretic. HENT:      Head: Normocephalic and atraumatic. Eyes:      General: No scleral icterus. Right eye: No discharge. Left eye: No discharge. Neck:      Musculoskeletal: Neck supple. Cardiovascular:      Rate and Rhythm: Normal rate and regular rhythm. Pulmonary:      Effort: Pulmonary effort is normal. No respiratory distress. Breath sounds: Normal breath sounds. No stridor. No wheezing or rales. Abdominal:      General: There is no distension. Palpations: Abdomen is soft. Tenderness: There is no abdominal tenderness. Comments: Obese   Musculoskeletal: Normal range of motion. Lymphadenopathy:      Cervical: No cervical adenopathy. Skin:     General: Skin is warm and dry. Findings: No erythema or rash. Neurological:      Mental Status: He is alert. Comments: He is awake and alert. He knows his name and where he is but he does not know his age or the year.    Psychiatric: Behavior: Behavior normal.             DIAGNOSTIC RESULTS     EKG: All EKG's are interpreted by the Emergency Department Physician who either signs or Co-signs this chart in the absence of a cardiologist.    EKG on my interpretation shows no acute findings    RADIOLOGY:   Non-plain film images such as CT, Ultrasound and MRI are read by the radiologist. Plain radiographic images are visualized and preliminarily interpreted by the emergency physician with the below findings:    Not indicated    Interpretation per the Radiologist below, if available at the time of this note:        LABS:  Labs Reviewed   CBC WITH AUTO DIFFERENTIAL - Abnormal; Notable for the following components:       Result Value    Hemoglobin 11.8 (*)     Hematocrit 39.4 (*)     RDW 16.9 (*)     Platelets 91 (*)     Seg Neutrophils 81 (*)     Lymphocytes 10 (*)     Immature Granulocytes 1 (*)     Absolute Lymph # 0.98 (*)     All other components within normal limits   BASIC METABOLIC PANEL - Abnormal; Notable for the following components:    Glucose 107 (*)     BUN 54 (*)     CREATININE 1.53 (*)     Bun/Cre Ratio 35 (*)     GFR Non- 47 (*)     GFR  57 (*)     All other components within normal limits   POC GLUCOSE FINGERSTICK - Abnormal; Notable for the following components:    POC Glucose 69 (*)     All other components within normal limits   POCT GLUCOSE - Normal   POC GLUCOSE FINGERSTICK   POC GLUCOSE FINGERSTICK       All other labs were within normal range or not returned as of this dictation. EMERGENCY DEPARTMENT COURSE and DIFFERENTIAL DIAGNOSIS/MDM:   Vitals:    Vitals:    01/12/21 1850 01/12/21 1905 01/12/21 1920 01/12/21 1935   BP: (!) 123/55 (!) 116/57 124/61 123/67   Pulse: 54 53 54 54   Resp: 17 14 14 13   Temp:       TempSrc:       SpO2: 94% 94% 98% 99%   Weight:           No orders of the defined types were placed in this encounter.       Medical Decision Making: The patient was hypoglycemic and

## 2021-01-13 PROBLEM — T38.3X1A: Status: ACTIVE | Noted: 2021-01-12

## 2021-01-13 LAB
ALBUMIN SERPL-MCNC: 3 G/DL (ref 3.5–5.2)
ALBUMIN/GLOBULIN RATIO: ABNORMAL (ref 1–2.5)
ALP BLD-CCNC: 185 U/L (ref 40–129)
ALT SERPL-CCNC: 9 U/L (ref 5–41)
AMMONIA: 21 UMOL/L (ref 16–60)
ANION GAP SERPL CALCULATED.3IONS-SCNC: 8 MMOL/L (ref 9–17)
AST SERPL-CCNC: 15 U/L
BILIRUB SERPL-MCNC: 0.33 MG/DL (ref 0.3–1.2)
BUN BLDV-MCNC: 45 MG/DL (ref 6–20)
BUN/CREAT BLD: 29 (ref 9–20)
CALCIUM SERPL-MCNC: 9.6 MG/DL (ref 8.6–10.4)
CHLORIDE BLD-SCNC: 101 MMOL/L (ref 98–107)
CO2: 31 MMOL/L (ref 20–31)
CREAT SERPL-MCNC: 1.53 MG/DL (ref 0.7–1.2)
GFR AFRICAN AMERICAN: 57 ML/MIN
GFR NON-AFRICAN AMERICAN: 47 ML/MIN
GFR SERPL CREATININE-BSD FRML MDRD: ABNORMAL ML/MIN/{1.73_M2}
GFR SERPL CREATININE-BSD FRML MDRD: ABNORMAL ML/MIN/{1.73_M2}
GLUCOSE BLD-MCNC: 106 MG/DL (ref 75–110)
GLUCOSE BLD-MCNC: 107 MG/DL (ref 75–110)
GLUCOSE BLD-MCNC: 112 MG/DL (ref 75–110)
GLUCOSE BLD-MCNC: 115 MG/DL (ref 75–110)
GLUCOSE BLD-MCNC: 117 MG/DL (ref 75–110)
GLUCOSE BLD-MCNC: 127 MG/DL (ref 75–110)
GLUCOSE BLD-MCNC: 142 MG/DL (ref 75–110)
GLUCOSE BLD-MCNC: 160 MG/DL (ref 75–110)
GLUCOSE BLD-MCNC: 198 MG/DL (ref 75–110)
GLUCOSE BLD-MCNC: 52 MG/DL (ref 75–110)
GLUCOSE BLD-MCNC: 67 MG/DL (ref 75–110)
GLUCOSE BLD-MCNC: 71 MG/DL (ref 70–99)
GLUCOSE BLD-MCNC: 77 MG/DL (ref 75–110)
GLUCOSE BLD-MCNC: 86 MG/DL (ref 75–110)
HCT VFR BLD CALC: 39.6 % (ref 40.7–50.3)
HEMOGLOBIN: 11.9 G/DL (ref 13–17)
INR BLD: 1.1
MCH RBC QN AUTO: 25.7 PG (ref 25.2–33.5)
MCHC RBC AUTO-ENTMCNC: 30.1 G/DL (ref 28.4–34.8)
MCV RBC AUTO: 85.5 FL (ref 82.6–102.9)
NRBC AUTOMATED: 0 PER 100 WBC
PDW BLD-RTO: 17.1 % (ref 11.8–14.4)
PLATELET # BLD: 99 K/UL (ref 138–453)
PMV BLD AUTO: 10.5 FL (ref 8.1–13.5)
POTASSIUM SERPL-SCNC: 4.4 MMOL/L (ref 3.7–5.3)
PROTHROMBIN TIME: 13.7 SEC (ref 11.5–14.2)
RBC # BLD: 4.63 M/UL (ref 4.21–5.77)
SODIUM BLD-SCNC: 140 MMOL/L (ref 135–144)
TOTAL PROTEIN: 7.4 G/DL (ref 6.4–8.3)
TSH SERPL DL<=0.05 MIU/L-ACNC: 4.82 MIU/L (ref 0.3–5)
WBC # BLD: 8.5 K/UL (ref 3.5–11.3)

## 2021-01-13 PROCEDURE — 84443 ASSAY THYROID STIM HORMONE: CPT

## 2021-01-13 PROCEDURE — 97535 SELF CARE MNGMENT TRAINING: CPT

## 2021-01-13 PROCEDURE — 80053 COMPREHEN METABOLIC PANEL: CPT

## 2021-01-13 PROCEDURE — 96376 TX/PRO/DX INJ SAME DRUG ADON: CPT

## 2021-01-13 PROCEDURE — G0378 HOSPITAL OBSERVATION PER HR: HCPCS

## 2021-01-13 PROCEDURE — 6370000000 HC RX 637 (ALT 250 FOR IP): Performed by: NURSE PRACTITIONER

## 2021-01-13 PROCEDURE — 2500000003 HC RX 250 WO HCPCS: Performed by: NURSE PRACTITIONER

## 2021-01-13 PROCEDURE — 97530 THERAPEUTIC ACTIVITIES: CPT

## 2021-01-13 PROCEDURE — 99225 PR SBSQ OBSERVATION CARE/DAY 25 MINUTES: CPT | Performed by: FAMILY MEDICINE

## 2021-01-13 PROCEDURE — 97163 PT EVAL HIGH COMPLEX 45 MIN: CPT

## 2021-01-13 PROCEDURE — 97167 OT EVAL HIGH COMPLEX 60 MIN: CPT

## 2021-01-13 PROCEDURE — 85027 COMPLETE CBC AUTOMATED: CPT

## 2021-01-13 PROCEDURE — 96361 HYDRATE IV INFUSION ADD-ON: CPT

## 2021-01-13 PROCEDURE — 2580000003 HC RX 258: Performed by: NURSE PRACTITIONER

## 2021-01-13 PROCEDURE — 97116 GAIT TRAINING THERAPY: CPT

## 2021-01-13 PROCEDURE — 82140 ASSAY OF AMMONIA: CPT

## 2021-01-13 PROCEDURE — 85610 PROTHROMBIN TIME: CPT

## 2021-01-13 RX ORDER — DEXTROSE MONOHYDRATE 100 MG/ML
INJECTION, SOLUTION INTRAVENOUS CONTINUOUS
Status: DISCONTINUED | OUTPATIENT
Start: 2021-01-13 | End: 2021-01-13

## 2021-01-13 RX ORDER — OXYCODONE AND ACETAMINOPHEN 7.5; 325 MG/1; MG/1
1 TABLET ORAL EVERY 6 HOURS PRN
Status: ON HOLD | COMMUNITY
Start: 2021-01-05 | End: 2021-10-29 | Stop reason: SDUPTHER

## 2021-01-13 RX ADMIN — RIFAXIMIN 550 MG: 550 TABLET ORAL at 08:11

## 2021-01-13 RX ADMIN — OXYBUTYNIN CHLORIDE 5 MG: 5 TABLET ORAL at 20:20

## 2021-01-13 RX ADMIN — DEXTROSE MONOHYDRATE: 100 INJECTION, SOLUTION INTRAVENOUS at 14:33

## 2021-01-13 RX ADMIN — NADOLOL 40 MG: 20 TABLET ORAL at 08:11

## 2021-01-13 RX ADMIN — ASPIRIN 81 MG: 81 TABLET, COATED ORAL at 08:11

## 2021-01-13 RX ADMIN — PANTOPRAZOLE SODIUM 40 MG: 40 TABLET, DELAYED RELEASE ORAL at 06:02

## 2021-01-13 RX ADMIN — DEXTROSE MONOHYDRATE: 100 INJECTION, SOLUTION INTRAVENOUS at 03:22

## 2021-01-13 RX ADMIN — ANTI-FUNGAL POWDER MICONAZOLE NITRATE TALC FREE: 1.42 POWDER TOPICAL at 08:11

## 2021-01-13 RX ADMIN — TAMSULOSIN HYDROCHLORIDE 0.4 MG: 0.4 CAPSULE ORAL at 08:11

## 2021-01-13 RX ADMIN — ANTI-FUNGAL POWDER MICONAZOLE NITRATE TALC FREE: 1.42 POWDER TOPICAL at 20:30

## 2021-01-13 RX ADMIN — LEVOTHYROXINE SODIUM 175 MCG: 0.17 TABLET ORAL at 06:02

## 2021-01-13 RX ADMIN — OXYBUTYNIN CHLORIDE 5 MG: 5 TABLET ORAL at 08:11

## 2021-01-13 RX ADMIN — DEXTROSE MONOHYDRATE 12.5 G: 25 INJECTION, SOLUTION INTRAVENOUS at 06:02

## 2021-01-13 RX ADMIN — QUETIAPINE FUMARATE 50 MG: 50 TABLET, EXTENDED RELEASE ORAL at 20:20

## 2021-01-13 RX ADMIN — SODIUM CHLORIDE, PRESERVATIVE FREE 10 ML: 5 INJECTION INTRAVENOUS at 21:00

## 2021-01-13 ASSESSMENT — ENCOUNTER SYMPTOMS
RHINORRHEA: 0
WHEEZING: 0
VOICE CHANGE: 0
CHEST TIGHTNESS: 0
SINUS PRESSURE: 0
ABDOMINAL PAIN: 0
BACK PAIN: 0
SHORTNESS OF BREATH: 0
DIARRHEA: 0
NAUSEA: 0
COUGH: 0
VOMITING: 0
CHOKING: 0
CONSTIPATION: 0

## 2021-01-13 ASSESSMENT — PAIN DESCRIPTION - ORIENTATION: ORIENTATION: LEFT

## 2021-01-13 NOTE — CARE COORDINATION
Social Work-Met with patient. Patient expresses anxiety about going to SNF and being isolated. Discussed that he is requiring mod assist of 2 to transfer and he may benefit from short term SNF. He would like to discuss with son tonight, but he may be agreeable to return to [address] [place] [address]. Wanda Angelo approved him for admission, but he can not admit on Xifaxin. DENNIS godinez.  Jaspreet Roberson

## 2021-01-13 NOTE — FLOWSHEET NOTE
Pt sitting up and alert at time of attempted visit. Pt talking on room phone and unavailable. Spiritual Care will attempt visit at later time. Chaplains will remain available to offer spiritual and emotional support as needed.        01/13/21 1613   Encounter Summary   Services provided to: Patient not available   Referral/Consult From: Ramy   Continue Visiting   (1/13/21 not available)   Routine   Type Initial     Electronically signed by Charles Dotson on 1/13/2021 at [place] [address]  [phone] difficulty concentrating/difficulty decision making/difficulty remembering

## 2021-01-13 NOTE — FLOWSHEET NOTE
01/13/21 1450   Provider Notification   Reason for Communication Review case   Provider Name Dr. Joshua Parsons   Provider Notification Physician   Method of Communication Secure Message   Response Waiting for response  (Regarding the patient's home medications and COVID swab.)   Notification Time 0221 [id number]     Dr. Joshua Parsons reviewed case, updated that the patient's home medications have been verified with the patient's pharmacy and updated. Also updated that the patient will need a COVID swab prior to discharge to SNF.

## 2021-01-13 NOTE — PROGRESS NOTES
Spoke with Mayda NP regarding patient BS. Pt BS is 52, while on 50mL/hr of D10. NP to place new orders for increase in fluids 100mL/hr. Explained patient has edema in lower extremities. NP states to give PRN dextrose 50% IV solution 12.5 g as well since patient is refusing the oral glucose gel. Will recheck BS in 15 minutes. New orders placed per NP    Recheck .

## 2021-01-13 NOTE — PROGRESS NOTES
Occupational Therapy   Occupational Therapy Initial Assessment  Date: 2021   Patient Name: Demi Carr  MRN: [id number]     : 1965    Date of Service: 2021     RN Tommie Vallecillo reports patient is medically stable for therapy treatment this date. Chart reviewed prior to treatment and patient is agreeable for therapy. All lines intact and patient positioned comfortably at end of treatment. All patient needs addressed prior to ending therapy session. Discharge Recommendations:  Subacute/Skilled Nursing Facility  OT Equipment Recommendations  Equipment Needed: Yes  Mobility Devices: ADL Assistive Devices  ADL Assistive Devices: Sock-Aid Soft;Reacher;Long-handled [place];Long-handled Sponge;Grab Bars - toilet    Assessment   Performance deficits / Impairments: Decreased functional mobility ; Decreased ADL status; Decreased strength;Decreased safe awareness;Decreased cognition;Decreased endurance;Decreased sensation;Decreased balance;Decreased high-level IADLs;Decreased fine motor control;Decreased coordination;Decreased posture  Assessment: Skilled OT is indicated to maximize I and safety during functional task to return home at [facility] with assistance. Prognosis: Fair  Decision Making: High Complexity  OT Education: OT Role;Plan of Care;ADL Adaptive Strategies;Transfer Training;Energy Conservation  Patient Education: safety in function, fall prevention/call light use, pursed lip breathing, benefits of being OOB, proper bed mobility tech. REQUIRES OT FOLLOW UP: Yes  Activity Tolerance  Activity Tolerance: Patient limited by fatigue;Treatment limited secondary to decreased cognition  Activity Tolerance: fair minus  Safety Devices  Safety Devices in place: Yes  Type of devices: Bed alarm in place;Call light within reach; Left in bed;Gait belt;Nurse notified           Patient Diagnosis(es): The primary encounter diagnosis was Hypoglycemia.  A diagnosis of Generalized weakness was also pertinent to this visit.     has a past medical history of Anxiety and depression, Back pain, chronic, BPH (benign prostatic hyperplasia), CHF (congestive heart failure) ([address] Cirrhosis ([address] Diabetes mellitus ([address] GERD (gastroesophageal reflux disease), H/O cardiac catheterization, Hepatitis, Hiatal hernia, History of alcohol abuse, Hyperlipidemia, Hypertension, IBS (irritable bowel syndrome), Morbid obesity ([address] Neuropathy, On home oxygen therapy, Pancreatitis, Primary osteoarthritis of right knee, Sleep apnea, Thyroid disease, and Urinary bladder neurogenic dysfunction. has a past surgical history that includes Colonoscopy; Abdomen surgery; hernia repair; Endoscopy, colon, diagnostic; Upper gastrointestinal endoscopy (07/19/2017); pr egd transoral biopsy single/multiple (N/A, 7/19/2017); pr deep dissec foot infec,1 bursa (Bilateral, 8/22/2017); Cystoscopy (01/24/2018); pr cystourethroscopy (N/A, 1/24/2018); Incisional hernia repair (05/20/2018); ventral hernia repair (N/A, 5/20/2018); Cystocopy (02/20/2019); Cystoscopy (N/A, 2/20/2019); Upper gastrointestinal endoscopy (N/A, 3/14/2019); and Cystoscopy (N/A, 8/23/2019). Per H&P: Siomara Scott is a 63 y.o. Non-/non  male who presents with Hypoglycemia   and is admitted to the hospital for the management of Hypoglycemia.     Presents to the hospital with complaint of hypoglycemia. The patient is a poor historian and is oriented to self and place. He is able to give a partial history. He states that he was at home being cared for by his son. He states that his son is not familiar with his medications and inadvertently gave him too much insulin. He is unable to further elaborate on the situation. He has no complaints at time of assessment. ER documents reflect that the patient's son accidentally gave him 74 units of long-acting insulin.   Apparently the patient complained of feeling like his blood sugar was low at home and his son gave him insulin instead of sugar to be consumed. EMS was called and the patient was found to be hypoglycemic, he was given an amp of D50. He has received additional supplemental dextrose, his hypoglycemia is waxing and waning. No further information is available at this time. The patient has past medical history that includes CHF, diabetes, thyroid disease, cirrhosis of the liver and uses oxygen at home. Of note, he was admitted 11/4/2020 and treated for metabolic encephalopathy as well as a UTI secondary to chronic indwelling urinary catheter. Restrictions  Position Activity Restriction  Other position/activity restrictions: RUE IV, Clemons cath, 2L O2 per nc    Subjective   General  Chart Reviewed: Yes  Patient assessed for rehabilitation services?: Yes  Family / Caregiver Present: No  Pt stated he has 7/10 pain in R knee. RN notified. Social/Functional History  Social/Functional History  Lives With: Son(Pt son works during the day)  Type of Home: House  Home Layout: One level  Home Access: Ramped entrance  Bathroom Shower/Tub: Walk-in shower  Bathroom Toilet: Handicap height  Bathroom Equipment: [address] chair, Grab bars in [address] Accessibility: [place]: Rolling walker, [employer], [employer] Help From: Family(Pt states son is supportive)  ADL Assistance: Needs assistance(Pt states he has aides 2x/wk 1hr/day that help with ADLs. Pt does a sponge bath between aid visits.)  Homemaking Assistance: Needs assistance(Pt states his son helps with cooking and cleaning)  Ambulation Assistance: Independent(Pt states he uses RW all times)  Transfer Assistance: Independent  Active : No  Occupation: On disability  Type of occupation: previously was a   Leisure & Hobbies: Watching TV  Additional Comments: Pt stated his son gave him to much insulin on accident, pt reported ex wife used to manage his medication but has not been helping for the past 3-4 weeks.        Objective Vision: Within Functional Limits(Pt denied recent visual changes)  Vision Exceptions: Wears glasses at all times  Hearing: Within functional limits    Orientation  Overall Orientation Status: Within Functional Limits(Pt stated he was in a hospital required cueing \"saint\" to know name)  Observation/Palpation  Observation: RUE IV, 2L O2 per nc, Bruise on L forearm  Edema: BLE     Balance  Sitting Balance: Minimal assistance  Standing Balance: Moderate assistance(x2 staff assist for safety and balance)  Standing Balance  Time: standing latrice < 1.5 with RW  Functional Mobility  Functional - Mobility Device: Rolling Walker  Activity: (bed around to [facility], BSC back to bed)  Assist Level: Moderate assistance(x2 staff assist)  Functional Mobility Comments: Pt required MAX verbal instruction/tactile assist for RW safety, upright posture, extending BUE on RW for increased support, pursed lip breathing, pacing self, scanning room and awareness/assist with lines all to increase safety. Toilet Transfers  Toilet - Technique: Ambulating  Equipment Used: Standard bedside commode  Toilet Transfer: Maximum assistance;2 Person assistance  Toilet Transfers Comments: MAX verbal instruction/tactile assist controlled stand to sit, up right posture, reaching back to surface prior to sitting, squaring self/AD up to surface, awareness/assist with lines, pursed lip breathing and RW safety all to increase overall safety during ADL transfers. ADL  Feeding: Setup  Grooming: Setup; Moderate assistance(seated)  UE Bathing: Dependent/Total  LE Bathing: Dependent/Total  UE Dressing: Dependent/Total  LE Dressing: Dependent/Total(To don B socks while supine in bed)  Toileting: Dependent/Total(wells cath. Pt was Dep for hygiene after BM on [facility] with x2 staff assist for saftey and balance.)  Additional Comments: Pt is DEP with all care d/t x2 staff assist when up. Pt requires increased effort/time for all ADLs.      Tone RUE  RUE Tone: Normotonic  Tone LUE  LUE Tone: Normotonic  Coordination  Movements Are Fluid And Coordinated: No(Slight tremor in BUE as well as slowed and delayed opposition)  Coordination and Movement description: Fine motor impairments;Tremors     Bed mobility  Bridging: Maximum assistance;2 Person assistance  Rolling to Right: Maximum assistance;2 Person assistance  Supine to Sit: Maximum assistance;2 Person assistance  Sit to Supine: Moderate assistance;2 Person assistance  Scooting: Maximal assistance;2 Person assistance  Comment: Pt required increased time and effort to complete. Pt required MAX verbal instruction/tactile assist for awarenress/assist with lines, hand placement on bed rail, proper bed mobility tech, and pursed lip breathing. Transfers  Stand Step Transfers: Maximum assistance;2 Person assistance  Sit to stand: Maximum assistance;2 Person assistance  Stand to sit: Maximum assistance;2 Person assistance  Transfer Comments: MAX verbal instruction/tactile assist controlled stand to sit, up right posture, reaching back to surface prior to sitting, squaring self/AD up to surface, awareness/assist with lines, pursed lip breathing and RW safety all to increase overall safety during ADL transfers. Cognition  Overall Cognitive Status: Exceptions  Arousal/Alertness: Appropriate responses to stimuli  Following Commands: Follows one step commands with repetition; Follows one step commands with increased time  Attention Span: Attends with cues to redirect  Memory: Decreased short term memory  Safety Judgement: Decreased awareness of need for assistance;Decreased awareness of need for safety  Problem Solving: Assistance required to generate solutions;Assistance required to identify errors made;Assistance required to implement solutions;Assistance required to correct errors made;Decreased awareness of errors  Insights: Decreased awareness of deficits  Initiation: Requires cues for some  Sequencing: Requires cues for some  Perception  Overall Perceptual Status: WFL     Sensation  Overall Sensation Status: Impaired(numbness/tingling in BLE)        LUE AROM (degrees)  LUE AROM : WFL  RUE AROM (degrees)  RUE AROM : WFL  LUE Strength  Gross LUE Strength: Exceptions to [facility]  LUE Strength Comment: Grossly 4/5  RUE Strength  Gross RUE Strength: Exceptions to [facility]  RUE Strength Comment: Grossly 4/5                   Plan   Plan  Times per week: 4-5x/wk 1x/day as latrice  Current Treatment Recommendations: Strengthening, Balance Training, Functional Mobility Training, Endurance Training, Pain Management, Safety Education & Training, Patient/Caregiver Education & Training, Equipment Evaluation, Education, & procurement, Home Management Training, Self-Care / ADL, Positioning             AM-PAC Score: 11             Goals  Short term goals  Time Frame for Short term goals: By discharge, pt to demo  Short term goal 1: ADL transfers and functional mobility to Mod of 1 with use of AD as needed. Short term goal 2: bed mobility to Min of 1 with use of bedrails as needed. Short term goal 3: UB ADLs to Min A and LB ADLs to Mod A with use of AD/AE as needed. Short term goal 4: increased BUE strength by 1/2 grade/ I with simple BUE HEP to assist with self care with use of handouts as needed. Short term goal 5: toileting to Mod A with use of grab bars/AD as needed. Long term goals  Long term goal 1: Pt to be I with fall prevention/EC/WS tech, pressure relief, recommendations for DME/AE with use of handouts as needed. Long term goal 2: Pt to demo increased standing latrice > 6 min with Mod A using AD as needed to reduce fall risk during self care tasks. Patient Goals   Patient goals : To go home! Co-treatment with PT warranted secondary to decreased safety and independence requiring 2 skilled therapy professionals to address individual discipline's goals.  OT addressing preparation for ADL transfer, sitting balance for increased ADL performance, sitting/activity tolerance, functional reaching, environmental safety/scanning, fall prevention, functional mobility for ADL transfers, ability to sequence and follow directions and functional UE strength. Patient would benefit from SNF for continued occupational therapy to increase independence with  ADL of bathing, dressing, toileting and grooming. Writer recommending SNF placement for for activity tolerance and strength which will increase independence with ADL's coordinated with bed mobility and chair transfers. Continued skilled OT services to address decreased safety awareness with ADL and IADL tasks and for education and increased independence with DME and AE for fall prevention and ec/ws techniques prior to d/c home.     Therapy Time   Individual Concurrent Group Co-treatment   Time In 1155(plus 10 min for chart review/RN communication)         Time Out 1250         Minutes [address] [facility],

## 2021-01-13 NOTE — CARE COORDINATION
Case Management Initial Discharge Plan  Jayashree Fails,         Readmission Risk              Risk of Unplanned Readmission:        0             Met with:family member patient and son to discuss discharge plans. Information verified: address, contacts, phone number, , insurance Yes  PCP: Tea Santos MD  Date of last visit: . Provider: Medicare/[facility]    Discharge Planning  Current Residence:     Living Arrangements:  [place] has one stories/ramp stairs to climb  Support Systems:  Spouse/Significant Other  Current Services PTA:    Supplier: Skilled nursing and skilled services-UNknown provider  Patient able to perform ADL's:Dependent  DME used to aid ambulation prior to admission: walker/wheelchair/during admissionTBD    Potential Assistance Needed:  [address]: [place]   Potential Assistance Purchasing Medications:  No  Does patient want to participate in local refill/ meds to beds program?  Not Assessed    Patient agreeable to home care: No  [place] of choice provided:  n/a      Type of Home Care Services:  OT, PT, [address], Nursing Services  Patient expects to be discharged to:  Home/SNF    Prior SNF/Rehab Placement and Facility: Yes, [facility] and [facility] to SNF/Rehab: Yes  [place] of choice provided: yes   Evaluation: yes    Expected Discharge date: Follow Up Appointment: Best Day/ Time:      Transportation provider: [facility]  Transportation arrangements needed for discharge: Yes    Discharge Plan: Spoke with patient's son regarding dc plans. He states that patient left [facility] 29. He is unsure of the home care agency. He would like patient to return to SNF. The Plan for Transition of Care is related to the following treatment goals: SNF with PT/OT    The Patient and/or patient representative son, Cristobal Mancilla was provided with a choice of provider and agrees   with the discharge plan.  [x] Yes [] No    Freedom of choice list was provided with basic dialogue that supports the patient's individualized plan of care/goals, treatment preferences and shares the quality data associated with the providers. [x] Yes [] No. Son is requesting a referral to [facility].  Sent referral. Sumi Zhao        Electronically signed by LEILA De nAda on 1/13/21 at 12:00 PM EST

## 2021-01-13 NOTE — PROGRESS NOTES
[facility]  Office: [address], DO, Alexandra Alert, DO, Eugenio Membreno, DO, Maya Kiransten Blood, DO, Agus Pineda MD, Carmelita Worthy MD, Miko Nevarez MD, Giovany Walden MD, Gabe Galicia MD, Yumiko Diana MD, Marcial Ng MD, Octavio Kenney MD, Mbon Aleida Smith MD, Nadine Pinon DO, Douglas Arias MD, Taty Brantley MD, Roxie Hein DO, Lady Mian MD,  Radha Barriga DO, Jh Wall MD, Beatriz Haider MD, Maite Wong, [facility], [facility], CNP, Scarlet Frost, CNP, Hamlet León, CNS, Willy Zepeda, CNP, Linnette Feliz, CNP, Ellie Holder, CNP, Kelly Walker, CNP, Bhaskar Dawkins, CNP, Tariq Chavez PA-C, Xavi Paniagua, [facility], Foster Aguilar, CNP, Deyvi Cruz, CNP, Genna Bhatia, CNP, Kamar Wadsworth, CNP, [facility]      Daily Progress Note     Admit Date: 1/12/2021  Bed/Room No.  1001/1001-02  Admitting Physician : Miko Nevarez MD  Code Status :Full Code  Hospital Day:  LOS: 0 days   Chief Complaint:     Chief Complaint   Patient presents with    Hypoglycemia     Principal Problem:    Poisoning by insulin and oral hypoglycemic (antidiabetic) drugs, accidental (unintentional), initial encounter  Active Problems:    Type 2 diabetes mellitus with diabetic neuropathy, with long-term current use of insulin ([facility] [address]    Essential hypertension    Dyslipidemia    Acquired hypothyroidism    LEONARDA (acute kidney injury) ([address]    Morbid obesity with BMI of 50.0-59.9, adult ([address]    On home oxygen therapy    Thrombocytopenia ([facility] [address]  Resolved Problems:    * No resolved hospital problems. *    Subjective : Interval History/Significant events :  01/13/21    Patient remains on dextrose infusion. Blood sugar continues to be low. Patient is not eating good. He denies any nausea, vomiting. No tremors, headaches. Vitals - Stable afebrile  Labs -low blood sugar. Nursing notes , Consults notes reviewed.  Overnight events and updates discussed with Nursing staff . Background History:         Palmer Soulier is 63 y.o. male  Who was admitted to the hospital on 1/12/2021 for treatment of Poisoning by insulin and oral hypoglycemic (antidiabetic) drugs, accidental (unintentional), initial encounter. Patient came to ER with \"very Low sugars \" at home. EMS was called in and patient was given Dextrose 50 % by EMS. Patient was AOX3 on arrival to ED and had blood sugar in 50's. Patient was unable to report blood sugar levels at home before treatment was started. He was given 2 more AMP of D50 and followed by D5 1/2 NS solution. Patient had inadvertently received higer dose of insulin at home administered by his son. Initial labs showed creatinine 1.53, PLT 91. EKG showed sinus bradycardia. Patient has underlying history of type 2 diabetes mellitus, liver cirrhosis, CHF, sleep apnea, hypothyroidism, recurrent pancreatitis. Patient has poor mobility at home and uses walker to go to the bathroom to bed. He had difficulty urinating and has Clemons catheter in place for last 1 month. He follows urology as outpatient. PMH:  Past Medical History:   Diagnosis Date    Anxiety and depression     Back pain, chronic     BPH (benign prostatic hyperplasia)     CHF (congestive heart failure) (HCC)     Cirrhosis (HCC)     Diabetes mellitus ([address]     not checking bloodsugar at home    GERD (gastroesophageal reflux disease)     H/O cardiac catheterization not sure of date    no stents    Hepatitis     Pt does not know the type.      Hiatal hernia     History of alcohol abuse     Sober since 2013    Hyperlipidemia     not taking any medication    Hypertension     IBS (irritable bowel syndrome)     Morbid obesity ([address]     Neuropathy     feet,toes    On home oxygen therapy     prn    Pancreatitis     x 5 episodes    Primary osteoarthritis of right knee     Sleep apnea     No machine    Thyroid disease     Urinary bladder neurogenic dysfunction Allergies: Allergies   Allergen Reactions    No Known Allergies       Medications :      aspirin, 81 mg, Oral, Daily      pantoprazole, 40 mg, Oral, QAM AC      levothyroxine, 175 mcg, Oral, Daily      nadolol, 40 mg, Oral, Daily      miconazole, , Topical, BID      oxybutynin, 5 mg, Oral, BID      QUEtiapine, 50 mg, Oral, Nightly      rifaximin, 550 mg, Oral, BID      [Held by provider] alogliptin, 12.5 mg, Oral, Daily      [Held by provider] spironolactone, 75 mg, Oral, BID      tamsulosin, 0.4 mg, Oral, Daily      sodium chloride flush, 10 mL, Intravenous, 2 times per day      [Held by provider] insulin lispro, 0-6 Units, Subcutaneous, TID WC      [Held by provider] insulin lispro, 0-3 Units, Subcutaneous, Nightly        Review of Systems   Review of Systems   Constitutional: Positive for activity change. Negative for appetite change, fatigue, fever and unexpected weight change. HENT: Negative for congestion, nosebleeds, rhinorrhea, sinus pressure, sneezing and voice change. Respiratory: Negative for cough, choking, chest tightness, shortness of breath and wheezing. Cardiovascular: Negative for chest pain, palpitations and leg swelling. Gastrointestinal: Negative for abdominal pain, constipation, diarrhea, nausea and vomiting. Genitourinary: Positive for difficulty urinating. Negative for discharge, dysuria, frequency and testicular pain. Musculoskeletal: Negative for back pain. Skin: Negative for rash. Neurological: Negative for dizziness, weakness, light-headedness and headaches. Hematological: Does not bruise/bleed easily. Psychiatric/Behavioral: Negative for agitation, behavioral problems, confusion, self-injury, sleep disturbance and suicidal ideas.      Objective :      Current Vitals : Temp: 97.8 °F (36.6 °C),  Pulse: 57, Resp: 16, BP: (!) 119/57, SpO2: 94 %  Last 24 Hrs Vitals   Patient Vitals for the past 24 hrs:   BP Temp Temp src Pulse Resp SpO2 Height Weight Pulses:           Dorsalis pedis pulses are 2+ on the right side and 2+ on the left side. Heart sounds: Normal heart sounds. No murmur. Pulmonary:      Effort: Pulmonary effort is normal.      Breath sounds: Normal breath sounds. No wheezing or rales. Abdominal:      Palpations: Abdomen is soft. There is no mass. Tenderness: There is no abdominal tenderness. Musculoskeletal:      Comments: Lower extremity edema   Lymphadenopathy:      Head:      Right side of head: No submandibular adenopathy. Left side of head: No submandibular adenopathy. Cervical: No cervical adenopathy. Skin:     General: Skin is warm. Neurological:      Mental Status: He is alert and oriented to person, place, and time. Motor: No tremor. Psychiatric:         Behavior: Behavior is cooperative.            Laboratory findings:    Recent Labs     01/12/21  1835 01/13/21  0503   WBC 10.0 8.5   HGB 11.8* 11.9*   HCT 39.4* 39.6*   PLT 91* 99*   INR  --  1.1     Recent Labs     01/12/21  1810 01/12/21  1835 01/13/21  0503   NA  --  137 140   K  --  4.6 4.4   CL  --  101 101   CO2  --  24 31   GLUCOSE 94 107* 71   BUN  --  54* 45*   CREATININE  --  1.53* 1.53*   CALCIUM  --  9.2 9.6     Recent Labs     01/13/21  0503   PROT 7.4   LABALBU 3.0*   TSH 4.82   AST 15   ALT 9   ALKPHOS 185*   BILITOT 0.33   AMMONIA 21          Specific Gravity, UA   Date Value Ref Range Status   11/06/2020 1.010 1.005 - 1.030 Final     Protein, UA   Date Value Ref Range Status   11/06/2020 TRACE (A) NEGATIVE Final     RBC, UA   Date Value Ref Range Status   11/06/2020 2 TO 5 0 - 2 /HPF Final     Blood, UA POC   Date Value Ref Range Status   05/04/2016 small       Bacteria, UA   Date Value Ref Range Status   11/06/2020 MODERATE (A) None Final     Nitrite, Urine   Date Value Ref Range Status   11/06/2020 POSITIVE (A) NEGATIVE Final     WBC, UA   Date Value Ref Range Status   11/06/2020 10 TO 20 0 - 5 /HPF Final     Leukocyte Esterase, Urine   Date Value Ref Range Status   11/06/2020 SMALL (A) NEGATIVE Final       Imaging / Clinical Data :-   No results found. Clinical Course : gradually improving  Assessment and Plan  :        1. Hypoglycemia due to accidentally insulin Overdose -continue D10 at 100 mL/h. Monitor blood sugar. Encourage oral intake. Hold insulin. 2. Type 2 DM -as above. 3. Liver cirrhosis -   4. Severe obesity -BMI 52.6.  5. Bilateral lower external lymphedema -   6. Urinary difficulty-has chronic Clemons catheter. Follows outpatient urology. Patient does not want to remove catheter. Risk of UTI explained. Continue to monitor vitals , Intake / output ,  Cell count , HGB , Kidney function, oxygenation  as indicated . Plan and updates discussed with patient ,  answers  explained to satisfaction.    Plan discussed with Staff Lanre Painter RN     (Please note that portions of this note were completed with a voice recognition program. Efforts were made to edit the dictations but occasionally words are mis-transcribed.)      Rosa Wheeler MD  1/13/2021 bleeding

## 2021-01-13 NOTE — CARE COORDINATION
[facility] for [facility], but can not accept patient if he is dc on Xifaxin. DENNIS completed.  Yunier Antis

## 2021-01-13 NOTE — DISCHARGE INSTR - COC
TRANSORAL BIOPSY SINGLE/MULTIPLE N/A 7/19/2017    EGD BIOPSY performed by Allison Davis MD at [address] [facility]  07/19/2017    polypectomy    UPPER GASTROINTESTINAL ENDOSCOPY N/A 3/14/2019    EGD BIOPSY performed by Manish Camarena MD at [address] [facility] N/A 5/20/2018    REPAIR AND REDUCTION OF RECURRENT INCARCERATED INCISIONAL HERNIA WITH MESH performed by Mary Gr MD at [address] [facility]       Immunization History: There is no immunization history on file for this patient.     Active Problems:  Patient Active Problem List   Diagnosis Code    Alcoholic cirrhosis of liver (HCC) K70.30    Peripheral edema R60.9    Bipolar disorder ([facility] [address] F31.9    Type 2 diabetes mellitus with diabetic neuropathy, with long-term current use of insulin ([facility]) E11.40, Z79.4    Essential hypertension I10    Dyslipidemia E78.5    Weakness R53.1    Hyponatremia V69.3    Metabolic encephalopathy T82.45    FABI (obstructive sleep apnea) G47.33    Primary osteoarthritis of right knee M17.11    Type 2 diabetes mellitus with diabetic neuropathy ([facility]) E11.40    Acquired hypothyroidism E03.9    Urine retention R33.9    Anemia D64.9    Cellulitis of right lower extremity L03.115    Slow transit constipation K59.01    Constipation K59.00    Iron deficiency anemia due to chronic blood loss D50.0    Gastroesophageal reflux disease without esophagitis K21.9    LEONARDA (acute kidney injury) ([facility] [address] N17.9    Secondary esophageal varices without bleeding ([facility]) I85.10    Portal hypertension ([facility]) K76.6    Morbid obesity with BMI of 50.0-59.9, adult ([facility]) E66.01, Z68.43    Venous stasis dermatitis of both lower extremities I87.2    Cellulitis of left lower extremity L03.116    Cellulitis of perineum L03.315    Epididymoorchitis N45.3    Abdominal pain R10.9    Multiple and open wound of lower limb S81.809A    Scrotal edema N50.89    Retention, urine R33.9    SBO (small bowel obstruction) ([facility] [address] K56.609 Incisional hernia with obstruction but no gangrene K43.0    Chronic indwelling Clemons catheter Z97.8    Anxiety and depression F41.9, F32.9    Back pain, chronic M54.9, G89.29    BPH (benign prostatic hyperplasia) N40.0    Chronic diastolic congestive heart failure (HCC) I50.32    Cirrhosis (HCC) K74.60    Diabetes mellitus (HCC) E11.9    GERD (gastroesophageal reflux disease) K21.9    H/O cardiac catheterization Z98.890    Hepatitis K75.9    Hiatal hernia K44.9    History of alcohol abuse F10.11    Hypertension I10    IBS (irritable bowel syndrome) K58.9    Morbid obesity (HCC) E66.01    Neuropathy G62.9    On home oxygen therapy Z99.81    Pancreatitis K85.90    Sleep apnea G47.30    Thyroid disease E07.9    Urinary bladder neurogenic dysfunction N31.9    Urinary tract infection associated with indwelling urethral catheter (HCC) T83.511A, N39.0    Delirium due to another medical condition F05    Musculoskeletal immobility Z74.09    Pyocystis N30.80    Myoclonic jerking G25.3    Poisoning by insulin and oral hypoglycemic (antidiabetic) drugs, accidental (unintentional), initial encounter T38.3X1A    Thrombocytopenia (HCC) D69.6       Isolation/Infection:   Isolation            No Isolation          Patient Infection Status       None to display            Nurse Assessment:  Last Vital Signs: BP (!) 125/49   Pulse 63   Temp 97.5 °F (36.4 °C) (Oral)   Resp 22   Ht 5' 10\" (1.778 m)   Wt (!) 366 lb 13.5 oz (166.4 kg)   SpO2 99%   BMI 52.64 kg/m²     Last documented pain score (0-10 scale): Pain Level: 7  Last Weight:   Wt Readings from Last 1 Encounters:   01/13/21 (!) 366 lb 13.5 oz (166.4 kg)     Mental Status:  oriented and alert    IV Access:  - None    Nursing Mobility/ADLs:  Walking   Assisted  Transfer  Assisted  Bathing  Assisted  Dressing  Assisted  Toileting  Assisted  Feeding  Independent  Med Admin  Independent  Med Delivery   whole    Wound Care Documentation and Therapy:  Wound 11/04/20 [address] [place], [place]   Phone:  [phone]  Fax:   Address:  Phone:  Fax:    Dialysis Facility (if applicable)   Name:  Address:  Dialysis Schedule:  Phone:  Fax:    / signature: Electronically signed by LEILA Sosa on 1/13/21 at 6:11 PM EST    PHYSICIAN SECTION    Prognosis: Fair    Condition at Discharge: Stable    Rehab Potential (if transferring to Rehab): Guarded    Recommended Labs or Other Treatments After Discharge: PT OT. Diabetic medications changes due to hypoglycemia. Patient was on Metformin 500 mg twice daily, glipizide 10, insulin glargine 72 units twice daily, Januvia 100 mg daily. Start Basaglar 30 U BID and monitor sugars and adjust as indicated. 2000 Kcal diet     Clemons catheter in place for urinary obstruction-follow with Dr. Gaurang Dent   Physician Certification: I certify the above information and transfer of Laurita Veras  is necessary for the continuing treatment of the diagnosis listed and that he requires [facility] for less 30 days.      Update Admission H&P: No change in H&P    PHYSICIAN SIGNATURE:  Electronically signed by Miko Nevarez MD on 1/15/2021 at 10:35 AM

## 2021-01-13 NOTE — PLAN OF CARE
Problem: Pain:  Goal: Pain level will decrease  Description: Pain level will decrease  Outcome: Met This Shift  Note: Patient's pain has been well controlled throughout the entire shift, please see MAR. Problem: Falls - Risk of:  Goal: Will remain free from falls  Description: Will remain free from falls  Outcome: Ongoing  Note: The patient remained free from falls this shift, call light within reach, bed in locked and lowest position. Side rails up x2. Continue to monitor closely. Problem: Skin Integrity:  Goal: Will show no infection signs and symptoms  Description: Will show no infection signs and symptoms  Outcome: Ongoing  Note: The patient has excoriation in his scrotal area, as well as on his back thighs. Patient had thick barrier cream applied, interdry in skin folds. Problem: Metabolic:  Goal: Ability to maintain appropriate glucose levels will improve  Description: Ability to maintain appropriate glucose levels will improve  Outcome: Ongoing  Note: The patient has remained a Q2H blood glucose check this shift on 100 mL/hr D-10. Patient encouraged to increase nutritional intake. Problem: Nutritional:  Goal: Maintenance of adequate nutrition will improve  Description: Maintenance of adequate nutrition will improve  Outcome: Ongoing  Note: Patient's appetite slowly increasing. Problem: Physical Regulation:  Goal: Complications related to the disease process, condition or treatment will be avoided or minimized  Description: Complications related to the disease process, condition or treatment will be avoided or minimized  Outcome: Not Met This Shift  Note: The patient was educated multiple times on insulin, as well as monitoring blood glucose levels closely. Per the patient he understands how his blood glucose monitoring works, however he was reminded that his blood glucose was in the 60's when he had long-acting Lantus given at home and thus brought about his hypoglycemia.

## 2021-01-13 NOTE — PROGRESS NOTES
The patient's home medications were reviewed from the [facility] [address] fax list. Please see updated list in \"home medications. \"

## 2021-01-13 NOTE — PROGRESS NOTES
Admitted from ER  to room 1001. Pt alert and disoriented. Pt oriented to call light system and agreeable to call for assistance. Admission documentation completed to best of writer ability, pt unable to answer all questions d/t pt confusion. Family unable to answer all questions.

## 2021-01-13 NOTE — ED NOTES
Pt given orange juice, refusing to eat anything. Dr. Janeth Qiuntanilla notified.       Johnny Franco, RN  01/12/21 [id number]

## 2021-01-13 NOTE — PROGRESS NOTES
Patient requested to see social work again. Shay Galan was called to remind her that the patient is requesting to speak with her prior to getting his COVID swab and prior to deciding on a SNF. Shay Galan stated she would be down as soon as possible, this information was relayed to the patient again.

## 2021-01-13 NOTE — FLOWSHEET NOTE
01/13/21 1628   Provider Notification   Reason for Communication Review case   Provider Name Dr. Verdis Leyden   Provider Notification Physician   Method of Communication Secure Message   Response Waiting for response  (Regarding blood glucose level.)   Notification Time 1628     Dr. Verdis Leyden updated regarding the patient's most recent blood glucose level and continuous IV fluids of D10. At this time order for D10 discontinued. Dr. Verdis Leyden would like to continue the [facility] blood glucose checks for now as we have just discontinued the D10. Will continue to follow.

## 2021-01-13 NOTE — PROGRESS NOTES
Physical Therapy    Facility/Department: [facility]  Initial Assessment    NAME: Nia Kapoor  : 1965  MRN: [id number]    Date of Service: 2021    Discharge Recommendations:  Subacute/Skilled Nursing Facility        Assessment   Body structures, Functions, Activity limitations: Decreased functional mobility ; Decreased endurance;Decreased strength;Decreased sensation;Decreased balance  Prognosis: Good  Decision Making: High Complexity  PT Education: General Safety;Plan of Care;PT Role  Patient Education: Ankle pumps handout  REQUIRES PT FOLLOW UP: Yes  Activity Tolerance  Activity Tolerance: Patient limited by endurance       Patient Diagnosis(es): The primary encounter diagnosis was Hypoglycemia. A diagnosis of Generalized weakness was also pertinent to this visit. has a past medical history of Anxiety and depression, Back pain, chronic, BPH (benign prostatic hyperplasia), CHF (congestive heart failure) ([facility] [address] Cirrhosis ([facility] [address] Diabetes mellitus ([facility] [address] GERD (gastroesophageal reflux disease), H/O cardiac catheterization, Hepatitis, Hiatal hernia, History of alcohol abuse, Hyperlipidemia, Hypertension, IBS (irritable bowel syndrome), Morbid obesity ([facility] [address] Neuropathy, On home oxygen therapy, Pancreatitis, Primary osteoarthritis of right knee, Sleep apnea, Thyroid disease, and Urinary bladder neurogenic dysfunction. has a past surgical history that includes Colonoscopy; Abdomen surgery; hernia repair; Endoscopy, colon, diagnostic; Upper gastrointestinal endoscopy (2017); pr egd transoral biopsy single/multiple (N/A, 2017); pr deep dissec foot infec,1 bursa (Bilateral, 2017); Cystoscopy (2018); pr cystourethroscopy (N/A, 2018); Incisional hernia repair (2018); ventral hernia repair (N/A, 2018); Cystocopy (2019); Cystoscopy (N/A, 2019);  Upper gastrointestinal endoscopy (N/A, 3/14/2019); and Cystoscopy (N/A, 8/23/2019). Restrictions  Restrictions/Precautions  Restrictions/Precautions: Fall Risk, General Precautions  Required Braces or Orthoses?: No  Position Activity Restriction  Other position/activity restrictions: RUE IV, Clemons cath, 2L O2 per nc  Vision/Hearing        Subjective  General  Chart Reviewed: Yes  Patient assessed for rehabilitation services?: Yes  Response To Previous Treatment: Not applicable  Family / Caregiver Present: No  Follows Commands: Within Functional Limits  General Comment  Comments: OK for PT per Stephen Stern RN  Pain Screening  Patient Currently in Pain: Yes  Pain Assessment  Pain Assessment: 0-10  Pain Level: 7  Pain Location: Knee  Pain Orientation: Left       Orientation  Orientation  Overall Orientation Status: Within Normal Limits(Pt A&O x 4 but slow to respond at times)  Social/Functional History  Social/Functional History  Lives With: Son(Pt son works during the day)  Type of Home: House  Home Layout: One level  Home Access: Ramped entrance  Bathroom Shower/Tub: Walk-in shower  Bathroom Toilet: Handicap height  Bathroom Equipment: Shower chair, Grab bars in shower  Bathroom Accessibility: Accessible  Home Equipment: Rolling walker, [employer], [employer] Help From: Family(Pt states son is supportive)  ADL Assistance: Needs assistance(Pt states he has aides 2x/wk 1hr/day that help with ADLs. Pt does a sponge bath between aid visits.)  Homemaking Assistance: Needs assistance(Pt states his son helps with cooking and cleaning)  Ambulation Assistance: Independent(Pt states he uses RW all times)  Transfer Assistance: Independent  Active : No  Occupation: On disability  Type of occupation: previously was a   Leisure & Hobbies: Watching TV  Additional Comments: Pt stated his son gave him to much insulin on accident, pt reported ex wife used to manage his medication but has not been helping for the past 3-4 weeks.   Cognition        Objective Observation/Palpation  Posture: Fair    AROM RLE (degrees)  RLE AROM: WFL(B LE knee flex/hip flex limited by tissue approximation)  AROM LLE (degrees)  LLE AROM : WFL  AROM RUE (degrees)  RUE General AROM: See OT eval for B UE ROM  Strength RLE  Strength RLE: WFL  Comment: B LE's 4/5 to 4+/5  Strength LLE  Strength LLE: WFL  Strength RUE  Comment: See OT eval for B UE MMT  Tone RLE  RLE Tone: Normotonic  Tone LLE  LLE Tone: Normotonic  Motor Control  Gross Motor?: WNL        Transfers  Sit to Stand: Moderate Assistance;2 Person Assistance  Stand to sit: Moderate Assistance;2 Person Assistance  Stand Pivot Transfers: Moderate Assistance;2 Person Assistance  Ambulation  Ambulation?: Yes  Ambulation 1  Surface: level tile  Device: Rolling Walker  Other Apparatus: O2  Assistance: Moderate assistance;2 Person assistance  Gait Deviations: Decreased step length;Decreased step height;Shuffles  Distance: 20' x 1 and 8' x 1  Comments: BTB  Stairs/Curb  Stairs?: No     Balance  Posture: Fair  Sitting - Static: Good  Sitting - Dynamic: Good;-  Standing - Static: Fair;-  Standing - Dynamic: Poor;+        Plan   Plan  Times per week: 1-2x/dasy,6-7 days/week  Current Treatment Recommendations: Strengthening, Balance Training, Functional Mobility Training, Transfer Training, Gait Training, Endurance Training  Safety Devices  Type of devices: Bed alarm in place, Gait belt, Call light within reach, Left in bed  Restraints  Initially in place: No    G-Code       OutComes Score                                                  AM-PAC Score             Goals  Short term goals  Time Frame for Short term goals: 12 treatments  Short term goal 1: Independent bed mobility/transfers  Short term goal 2:  Independent ambulation w/ ' x 1  Short term goal 3: Tolerate 30 min ther act  Short term goal 4: Good sitting and standing balance  Short term goal 5: 5/5 B LE's  Patient Goals   Patient goals : Go home when able to get around on my own       Therapy Time   Individual Concurrent Group Co-treatment   Time In 1         Time Out 9529         Minutes 62              Co-treatment with OT warranted secondary to decreased safety and independence requiring 2 skilled therapy professionals to address individual discipline's goals. Claritza Rosario, PT

## 2021-01-13 NOTE — PROGRESS NOTES
Pt has D5 0.45 NS at 50mL/hr running. Just checked BS. BS is 67. Pt given orange juice but it is still dropping even with the fluids running. Notified ANNIE Jackson of information, new orders placed. Will continue to monitor.

## 2021-01-14 LAB
AMMONIA: 15 UMOL/L (ref 16–60)
EKG ATRIAL RATE: 57 BPM
EKG P AXIS: 65 DEGREES
EKG P-R INTERVAL: 194 MS
EKG Q-T INTERVAL: 440 MS
EKG QRS DURATION: 108 MS
EKG QTC CALCULATION (BAZETT): 428 MS
EKG R AXIS: -28 DEGREES
EKG T AXIS: 15 DEGREES
EKG VENTRICULAR RATE: 57 BPM
GLUCOSE BLD-MCNC: 119 MG/DL (ref 75–110)
GLUCOSE BLD-MCNC: 122 MG/DL (ref 75–110)
GLUCOSE BLD-MCNC: 127 MG/DL (ref 75–110)
GLUCOSE BLD-MCNC: 135 MG/DL (ref 75–110)
GLUCOSE BLD-MCNC: 141 MG/DL (ref 75–110)
GLUCOSE BLD-MCNC: 181 MG/DL (ref 75–110)
GLUCOSE BLD-MCNC: 194 MG/DL (ref 75–110)
GLUCOSE BLD-MCNC: 83 MG/DL (ref 75–110)
GLUCOSE BLD-MCNC: 89 MG/DL (ref 75–110)
GLUCOSE BLD-MCNC: 96 MG/DL (ref 75–110)
SARS-COV-2, RAPID: NOT DETECTED
SARS-COV-2: NORMAL
SARS-COV-2: NORMAL
SOURCE: NORMAL

## 2021-01-14 PROCEDURE — 93010 ELECTROCARDIOGRAM REPORT: CPT | Performed by: INTERNAL MEDICINE

## 2021-01-14 PROCEDURE — 1200000000 HC SEMI PRIVATE

## 2021-01-14 PROCEDURE — 6360000002 HC RX W HCPCS: Performed by: NURSE PRACTITIONER

## 2021-01-14 PROCEDURE — 97530 THERAPEUTIC ACTIVITIES: CPT

## 2021-01-14 PROCEDURE — U0002 COVID-19 LAB TEST NON-CDC: HCPCS

## 2021-01-14 PROCEDURE — 6370000000 HC RX 637 (ALT 250 FOR IP): Performed by: NURSE PRACTITIONER

## 2021-01-14 PROCEDURE — 36415 COLL VENOUS BLD VENIPUNCTURE: CPT

## 2021-01-14 PROCEDURE — 6370000000 HC RX 637 (ALT 250 FOR IP): Performed by: FAMILY MEDICINE

## 2021-01-14 PROCEDURE — 97112 NEUROMUSCULAR REEDUCATION: CPT

## 2021-01-14 PROCEDURE — 97535 SELF CARE MNGMENT TRAINING: CPT

## 2021-01-14 PROCEDURE — 82947 ASSAY GLUCOSE BLOOD QUANT: CPT

## 2021-01-14 PROCEDURE — 99232 SBSQ HOSP IP/OBS MODERATE 35: CPT | Performed by: FAMILY MEDICINE

## 2021-01-14 PROCEDURE — 82140 ASSAY OF AMMONIA: CPT

## 2021-01-14 PROCEDURE — 2580000003 HC RX 258: Performed by: NURSE PRACTITIONER

## 2021-01-14 RX ORDER — CLONAZEPAM 0.5 MG/1
1 TABLET ORAL 2 TIMES DAILY PRN
Status: DISCONTINUED | OUTPATIENT
Start: 2021-01-14 | End: 2021-01-15 | Stop reason: HOSPADM

## 2021-01-14 RX ORDER — INSULIN GLARGINE 100 [IU]/ML
20 INJECTION, SOLUTION SUBCUTANEOUS 2 TIMES DAILY
Status: DISCONTINUED | OUTPATIENT
Start: 2021-01-14 | End: 2021-01-15 | Stop reason: HOSPADM

## 2021-01-14 RX ORDER — DULOXETIN HYDROCHLORIDE 60 MG/1
60 CAPSULE, DELAYED RELEASE ORAL EVERY MORNING
Status: DISCONTINUED | OUTPATIENT
Start: 2021-01-14 | End: 2021-01-15 | Stop reason: HOSPADM

## 2021-01-14 RX ORDER — PREGABALIN 75 MG/1
150 CAPSULE ORAL 2 TIMES DAILY
Status: DISCONTINUED | OUTPATIENT
Start: 2021-01-14 | End: 2021-01-15 | Stop reason: HOSPADM

## 2021-01-14 RX ORDER — OXYCODONE HYDROCHLORIDE AND ACETAMINOPHEN 5; 325 MG/1; MG/1
1.5 TABLET ORAL EVERY 6 HOURS PRN
Status: DISCONTINUED | OUTPATIENT
Start: 2021-01-14 | End: 2021-01-15 | Stop reason: HOSPADM

## 2021-01-14 RX ORDER — CLONAZEPAM 1 MG/1
1 TABLET ORAL 2 TIMES DAILY PRN
Qty: 6 TABLET | Refills: 0 | Status: ON HOLD | OUTPATIENT
Start: 2021-01-14 | End: 2021-10-29 | Stop reason: HOSPADM

## 2021-01-14 RX ADMIN — OXYBUTYNIN CHLORIDE 5 MG: 5 TABLET ORAL at 21:33

## 2021-01-14 RX ADMIN — ANTI-FUNGAL POWDER MICONAZOLE NITRATE TALC FREE: 1.42 POWDER TOPICAL at 08:26

## 2021-01-14 RX ADMIN — PREGABALIN 150 MG: 75 CAPSULE ORAL at 21:32

## 2021-01-14 RX ADMIN — CLONAZEPAM 1 MG: 0.5 TABLET ORAL at 23:50

## 2021-01-14 RX ADMIN — INSULIN GLARGINE 20 UNITS: 100 INJECTION, SOLUTION SUBCUTANEOUS at 21:34

## 2021-01-14 RX ADMIN — OXYCODONE HYDROCHLORIDE AND ACETAMINOPHEN 1.5 TABLET: 5; 325 TABLET ORAL at 23:50

## 2021-01-14 RX ADMIN — QUETIAPINE FUMARATE 50 MG: 50 TABLET, EXTENDED RELEASE ORAL at 21:33

## 2021-01-14 RX ADMIN — NADOLOL 40 MG: 20 TABLET ORAL at 08:25

## 2021-01-14 RX ADMIN — PANTOPRAZOLE SODIUM 40 MG: 40 TABLET, DELAYED RELEASE ORAL at 05:59

## 2021-01-14 RX ADMIN — SODIUM CHLORIDE, PRESERVATIVE FREE 10 ML: 5 INJECTION INTRAVENOUS at 08:26

## 2021-01-14 RX ADMIN — TAMSULOSIN HYDROCHLORIDE 0.4 MG: 0.4 CAPSULE ORAL at 08:26

## 2021-01-14 RX ADMIN — ONDANSETRON 4 MG: 2 INJECTION INTRAMUSCULAR; INTRAVENOUS at 14:07

## 2021-01-14 RX ADMIN — OXYBUTYNIN CHLORIDE 5 MG: 5 TABLET ORAL at 08:25

## 2021-01-14 RX ADMIN — ANTI-FUNGAL POWDER MICONAZOLE NITRATE TALC FREE: 1.42 POWDER TOPICAL at 21:37

## 2021-01-14 RX ADMIN — DULOXETINE HYDROCHLORIDE 60 MG: 60 CAPSULE, DELAYED RELEASE ORAL at 11:48

## 2021-01-14 RX ADMIN — SODIUM CHLORIDE, PRESERVATIVE FREE 10 ML: 5 INJECTION INTRAVENOUS at 21:33

## 2021-01-14 RX ADMIN — ASPIRIN 81 MG: 81 TABLET, COATED ORAL at 08:25

## 2021-01-14 RX ADMIN — PREGABALIN 150 MG: 75 CAPSULE ORAL at 11:48

## 2021-01-14 RX ADMIN — LEVOTHYROXINE SODIUM 175 MCG: 0.17 TABLET ORAL at 05:59

## 2021-01-14 ASSESSMENT — ENCOUNTER SYMPTOMS
CONSTIPATION: 0
ABDOMINAL PAIN: 0
VOMITING: 0
RHINORRHEA: 0
BACK PAIN: 0
COUGH: 0
WHEEZING: 0
VOICE CHANGE: 0
CHOKING: 0
DIARRHEA: 0
NAUSEA: 0
SINUS PRESSURE: 0
SHORTNESS OF BREATH: 0
CHEST TIGHTNESS: 0

## 2021-01-14 ASSESSMENT — PAIN DESCRIPTION - DESCRIPTORS: DESCRIPTORS: ACHING;SORE

## 2021-01-14 ASSESSMENT — PAIN SCALES - GENERAL
PAINLEVEL_OUTOF10: 0
PAINLEVEL_OUTOF10: 8

## 2021-01-14 ASSESSMENT — PAIN DESCRIPTION - LOCATION: LOCATION: KNEE

## 2021-01-14 NOTE — PROGRESS NOTES
Occupational Therapy  Facility/Department: [facility] PROGRESSIVE CARE  Daily Treatment Note  NAME: Jayashree Mckeon  : 1965  MRN: [id number]    Date of Service: 2021    Discharge Recommendations:  [address]      Patient would benefit from SNF for continued occupational therapy to increase independence with  ADL of bathing, dressing, toileting and grooming. Writer recommending SNF placement for for activity tolerance and strength which will increase independence with ADL's coordinated with bed mobility and chair transfers. Continued skilled OT services to address decreased safety awareness with ADL and IADL tasks and for education and increased independence with DME and AE for fall prevention and ec/ws techniques prior to d/c home. RN reports patient is medically stable for therapy treatment this date. Chart reviewed prior to treatment and patient is agreeable for therapy. All lines intact and patient positioned comfortably at end of treatment. All patient needs addressed prior to ending therapy session. Assessment   Performance deficits / Impairments: Decreased functional mobility ; Decreased ADL status; Decreased strength;Decreased safe awareness;Decreased cognition;Decreased endurance;Decreased sensation;Decreased balance;Decreased high-level IADLs;Decreased fine motor control;Decreased coordination;Decreased posture  Assessment: Skilled OT is indicated to maximize I and safety during functional task to return home at [facility] with assistance. Prognosis: Fair  OT Education: OT Role;Plan of Care;Transfer Training;Energy Conservation; ADL Adaptive Strategies  Patient Education: transfer safety, RW safety/mgmt, proper bed mobility tech. REQUIRES OT FOLLOW UP: Yes  Activity Tolerance  Activity Tolerance: Patient limited by fatigue  Safety Devices  Safety Devices in place: Yes  Type of devices: Bed alarm in place;Call light within reach; Left in bed;Gait belt;Nurse notified Patient Diagnosis(es): The primary encounter diagnosis was Hypoglycemia. A diagnosis of Generalized weakness was also pertinent to this visit. has a past medical history of Anxiety and depression, Back pain, chronic, BPH (benign prostatic hyperplasia), CHF (congestive heart failure) ([facility] [address] Cirrhosis ([facility] [address] Diabetes mellitus ([facility] [address] GERD (gastroesophageal reflux disease), H/O cardiac catheterization, Hepatitis, Hiatal hernia, History of alcohol abuse, Hyperlipidemia, Hypertension, IBS (irritable bowel syndrome), Morbid obesity ([facility] [address] Neuropathy, On home oxygen therapy, Pancreatitis, Primary osteoarthritis of right knee, Sleep apnea, Thyroid disease, and Urinary bladder neurogenic dysfunction. has a past surgical history that includes Colonoscopy; Abdomen surgery; hernia repair; Endoscopy, colon, diagnostic; Upper gastrointestinal endoscopy (07/19/2017); pr egd transoral biopsy single/multiple (N/A, 7/19/2017); pr deep dissec foot infec,1 bursa (Bilateral, 8/22/2017); Cystoscopy (01/24/2018); pr cystourethroscopy (N/A, 1/24/2018); Incisional hernia repair (05/20/2018); ventral hernia repair (N/A, 5/20/2018); Cystocopy (02/20/2019); Cystoscopy (N/A, 2/20/2019); Upper gastrointestinal endoscopy (N/A, 3/14/2019); and Cystoscopy (N/A, 8/23/2019). Restrictions  Restrictions/Precautions  Restrictions/Precautions: Fall Risk, Up as Tolerated  Required Braces or Orthoses?: No  Position Activity Restriction  Other position/activity restrictions: RUE IV, Clemons cath, 2L O2 per nc  Subjective   General  Chart Reviewed: Yes  Patient assessed for rehabilitation services?: Yes  Family / Caregiver Present: No      Orientation  Orientation  Overall Orientation Status: Within Functional Limits  Objective    ADL  Toileting: Dependent/Total(Clemons, x2 A for BSC transfer, DEP for wiping at [facility] with x2 staff assist for safety and balance.  Pt required sitting breaks d/t fatigue)        Balance  Sitting Balance: Stand by assistance  Standing Balance: Moderate assistance(Mod A x2 to Min A x1 while standing at [facility])  Standing Balance  Time: ~1 min  Activity: Static standing at [facility] for jeniffer care  Comment: Pt fatigued quickly and requried rest breaks during jeniffer care  Functional Mobility  Functional - Mobility Device: Rolling Walker  Activity: (To/from [facility])  Assist Level: Moderate assistance(x2)  Functional Mobility Comments: Pt required MAX verbal instruction/tactile assist for RW safety,pursed lip breathing, pacing self, awareness/assist with lines all to increase safety. Bed mobility  Supine to Sit: Moderate assistance;2 Person assistance  Sit to Supine: Moderate assistance;2 Person assistance  Scooting: Maximal assistance;2 Person assistance  Comment: HOB elevated for supine to sit, VCs for use of handrails and sequencing. VCs for use of BLE and BUE for scooting to [address] with MAX A x2. Transfers  Sit to stand: Maximum assistance;2 Person assistance  Stand to sit: Maximum assistance;2 Person assistance  Transfer Comments: MAX verbal instruction/tactile assist controlled stand to sit, reaching back to surface prior to sitting, squaring self/AD up to surface, awareness/assist with lines, pursed lip breathing and RW safety all to increase overall safety during ADL transfers. Cognition  Overall Cognitive Status: Exceptions  Arousal/Alertness: Appropriate responses to stimuli  Following Commands: Follows one step commands with repetition; Follows one step commands with increased time  Attention Span: Attends with cues to redirect  Memory: Decreased short term memory  Safety Judgement: Decreased awareness of need for assistance;Decreased awareness of need for safety  Problem Solving: Assistance required to generate solutions;Assistance required to identify errors made;Assistance required to implement solutions;Assistance required to correct errors made;Decreased awareness of errors  Insights: Decreased awareness of deficits  Initiation: Requires cues for some  Sequencing: Requires cues for some     Perception  Overall Perceptual Status: WFL              Type of ROM/Therapeutic Exercise  Type of ROM/Therapeutic Exercise: AROM  Exercises  Scapular Protraction: 1 set of 10  Scapular Retraction: 1 set of 10  Shoulder Flexion: 1 set of 10  Shoulder Extension: 1 set of 10  Elbow Flexion: 1 set of 10  Elbow Extension: 1 set of 10  Other: VCs for rest breaks and to decrease speed of exercise                    Plan   Plan  Times per week: 4-5x/wk 1x/day as latrice  Current Treatment Recommendations: Strengthening, Balance Training, Functional Mobility Training, Endurance Training, Pain Management, Safety Education & Training, Patient/Caregiver Education & Training, Equipment Evaluation, Education, & procurement, Home Management Training, Self-Care / ADL, Positioning    AM-PAC Score        AM-[facility] Inpatient Daily Activity Raw Score: 11 (01/14/21 1307)  AM-PAC Inpatient ADL T-Scale Score : 29.04 (01/14/21 1307)  ADL Inpatient CMS 0-100% Score: 70.42 (01/14/21 1307)  ADL Inpatient CMS G-Code Modifier : CL (01/14/21 1307)    Goals  Short term goals  Time Frame for Short term goals: By discharge, pt to demo  Short term goal 1: ADL transfers and functional mobility to Mod of 1 with use of AD as needed. Short term goal 2: bed mobility to Min of 1 with use of bedrails as needed. Short term goal 3: UB ADLs to Min A and LB ADLs to Mod A with use of AD/AE as needed. Short term goal 4: increased BUE strength by 1/2 grade/ I with simple BUE HEP to assist with self care with use of handouts as needed. Short term goal 5: toileting to Mod A with use of grab bars/AD as needed. Long term goals  Long term goal 1: Pt to be I with fall prevention/EC/WS tech, pressure relief, recommendations for DME/AE with use of handouts as needed.   Long term goal 2: Pt to demo increased standing latrice > 6 min with Mod A using AD as needed to reduce fall risk during self care tasks. Patient Goals   Patient goals : To go home! Therapy Time   Individual Concurrent Group Co-treatment   Time In 1623         Time Out 1000         Minutes 37               Upon writer exit, call light within reach, pt retired to bed. All lines intact and patient positioned comfortably. All patient needs addressed prior to ending therapy session. Chart reviewed prior to treatment and patient is agreeable for therapy. RN reports patient is medically stable for therapy treatment this date. Co-treatment with PT warranted secondary to decreased safety and independence requiring 2 skilled therapy professionals to address individual discipline's goals. OT addressing preparation for ADL transfer, sitting balance for increased ADL performance, sitting/activity tolerance, functional reaching, environmental safety/scanning, fall prevention, functional mobility for ADL transfers, ability to sequence and follow directions and functional UE strength.       Luis Mancilla, OT

## 2021-01-14 NOTE — PROGRESS NOTES
[facility]  Office: [address], DO, Augustus Desir, DO, Kendra Perez, DO, Alok Maribeth Blood, DO, Arnaud Jane MD, Oddis Nissen, MD, Jacqueline Gramajo MD, Nikhil Munson MD, Celina Marrero MD, Duane Cantu MD, Marcus Chakraborty MD, Vicky Gerardo MD, Emily Paz MD, Edd Ureña DO, Taylor Ma MD, Fredrick Grace MD, Meli Correa, DO, Shayy Santiago MD,  Myra Motley DO, Savannah Sarabia MD, Nori Schwartz MD, Tamera Dawkins [facility], [facility], CNP, Delmar Hansen, CNP, Kerry Ho, CNS, Janee Do, CNP, Hermelinda Overton, CNP, Jennifer Rhodes, CNP, Luz Nagy, [facility], Jong Clark, CNP, Jhon Gonzalez PA-C, Elise Cabezas, [facility], Angie Olivares, CNP, Prema Couch, CNP, Corrie Patino, CNP, Mirella Duncan, CNP, Jose [facility]      Daily Progress Note     Admit Date: 1/12/2021  Bed/Room No.  1001/1001-02  Admitting Physician : Jacqueline Gramajo MD  Code Status :Full Code  Hospital Day:  LOS: 0 days   Chief Complaint:     Chief Complaint   Patient presents with    Hypoglycemia     Principal Problem:    Poisoning by insulin and oral hypoglycemic (antidiabetic) drugs, accidental (unintentional), initial encounter  Active Problems:    Type 2 diabetes mellitus with diabetic neuropathy, with long-term current use of insulin ([facility] [address]    Essential hypertension    Dyslipidemia    Acquired hypothyroidism    LEONARDA (acute kidney injury) ([facility] [address]    Morbid obesity with BMI of 50.0-59.9, adult ([facility] [facility]    On home oxygen therapy    Thrombocytopenia ([facility] [address]  Resolved Problems:    * No resolved hospital problems. *    Subjective : Interval History/Significant events :  01/14/21    Patient is off dextrose infusion. He is eating much better. Blood sugar is controlled. All diabetic medication are on hold. Vitals - Stable afebrile  Labs -blood sugar stable. Nursing notes , Consults notes reviewed. Overnight events and updates discussed with Nursing staff . Background History:         Khushbu Ramirez is 63 y.o. male  Who was admitted to the hospital on 1/12/2021 for treatment of Poisoning by insulin and oral hypoglycemic (antidiabetic) drugs, accidental (unintentional), initial encounter. Patient came to ER with \"very Low sugars \" at home. EMS was called in and patient was given Dextrose 50 % by EMS. Patient was AOX3 on arrival to ED and had blood sugar in 50's. Patient was unable to report blood sugar levels at home before treatment was started. He was given 2 more AMP of D50 and followed by D5 1/2 NS solution. Patient had inadvertently received higer dose of insulin at home administered by his son. Initial labs showed creatinine 1.53, PLT 91. EKG showed sinus bradycardia. Patient has underlying history of type 2 diabetes mellitus, liver cirrhosis, CHF, sleep apnea, hypothyroidism, recurrent pancreatitis. Patient has poor mobility at home and uses walker to go to the bathroom to bed. He had difficulty urinating and has Clemons catheter in place for last 1 month. He follows urology as outpatient. PMH:  Past Medical History:   Diagnosis Date    Anxiety and depression     Back pain, chronic     BPH (benign prostatic hyperplasia)     CHF (congestive heart failure) (HCC)     Cirrhosis (HCC)     Diabetes mellitus ([address]     not checking bloodsugar at home    GERD (gastroesophageal reflux disease)     H/O cardiac catheterization not sure of date    no stents    Hepatitis     Pt does not know the type.  Hiatal hernia     History of alcohol abuse     Sober since 2013    Hyperlipidemia     not taking any medication    Hypertension     IBS (irritable bowel syndrome)     Morbid obesity ([address]     Neuropathy     feet,toes    On home oxygen therapy     prn    Pancreatitis     x 5 episodes    Primary osteoarthritis of right knee     Sleep apnea     No machine    Thyroid disease     Urinary bladder neurogenic dysfunction       Allergies:    Allergies Allergen Reactions    No Known Allergies       Medications :      aspirin, 81 mg, Oral, Daily      pantoprazole, 40 mg, Oral, QAM AC      levothyroxine, 175 mcg, Oral, Daily      nadolol, 40 mg, Oral, Daily      miconazole, , Topical, BID      oxybutynin, 5 mg, Oral, BID      QUEtiapine, 50 mg, Oral, Nightly      [Held by provider] alogliptin, 12.5 mg, Oral, Daily      tamsulosin, 0.4 mg, Oral, Daily      sodium chloride flush, 10 mL, Intravenous, 2 times per day      [Held by provider] insulin lispro, 0-6 Units, Subcutaneous, TID WC      [Held by provider] insulin lispro, 0-3 Units, Subcutaneous, Nightly        Review of Systems   Review of Systems   Constitutional: Positive for activity change. Negative for appetite change, fatigue, fever and unexpected weight change. HENT: Negative for congestion, nosebleeds, rhinorrhea, sinus pressure, sneezing and voice change. Respiratory: Negative for cough, choking, chest tightness, shortness of breath and wheezing. Cardiovascular: Negative for chest pain, palpitations and leg swelling. Gastrointestinal: Negative for abdominal pain, constipation, diarrhea, nausea and vomiting. Genitourinary: Positive for difficulty urinating. Negative for discharge, dysuria, frequency and testicular pain. Musculoskeletal: Negative for back pain. Skin: Negative for rash. Neurological: Negative for dizziness, weakness, light-headedness and headaches. Hematological: Does not bruise/bleed easily. Psychiatric/Behavioral: Negative for agitation, behavioral problems, confusion, self-injury, sleep disturbance and suicidal ideas.      Objective :      Current Vitals : Temp: 98 °F (36.7 °C),  Pulse: 57, Resp: 16, BP: (!) 110/56, SpO2: 99 %  Last 24 Hrs Vitals   Patient Vitals for the past 24 hrs:   BP Temp Temp src Pulse Resp SpO2 Weight   01/14/21 0502 -- -- -- -- -- -- (!) 415 lb (188.2 kg)   01/14/21 0329 (!) 110/56 98 °F (36.7 °C) Oral 57 16 99 % --   01/14/21 0011 108/62 97.8 °F (36.6 °C) Oral 62 16 100 % --   01/13/21 2005 123/67 98 °F (36.7 °C) Oral 68 16 97 % --   01/13/21 1515 (!) 125/49 97.5 °F (36.4 °C) Oral 63 22 99 % --   01/13/21 1223 132/63 98.1 °F (36.7 °C) Oral 68 18 99 % --   01/13/21 0802 120/68 98.2 °F (36.8 °C) Oral 59 16 96 % --     Intake / output   01/13 0701 - 01/14 0700  In: 2066 [P.O.:725; I.V.:1341]  Out: 1325 [Urine:1325]  Physical Exam:  Physical Exam  Vitals signs and nursing note reviewed. Constitutional:       General: He is not in acute distress. Appearance: He is morbidly obese. He is not diaphoretic. HENT:      Head: Normocephalic and atraumatic. Nose:      Right Sinus: No maxillary sinus tenderness or frontal sinus tenderness. Left Sinus: No maxillary sinus tenderness or frontal sinus tenderness. Mouth/Throat:      Pharynx: No oropharyngeal exudate. Eyes:      General: No scleral icterus. Conjunctiva/sclera: Conjunctivae normal.      Pupils: Pupils are equal, round, and reactive to light. Neck:      Musculoskeletal: Full passive range of motion without pain and neck supple. Thyroid: No thyromegaly. Vascular: No JVD. Cardiovascular:      Rate and Rhythm: Normal rate and regular rhythm. Pulses:           Dorsalis pedis pulses are 2+ on the right side and 2+ on the left side. Heart sounds: Normal heart sounds. No murmur. Pulmonary:      Effort: Pulmonary effort is normal.      Breath sounds: Normal breath sounds. No wheezing or rales. Abdominal:      Palpations: Abdomen is soft. There is no mass. Tenderness: There is no abdominal tenderness. Musculoskeletal:      Comments: Lower extremity edema   Lymphadenopathy:      Head:      Right side of head: No submandibular adenopathy. Left side of head: No submandibular adenopathy. Cervical: No cervical adenopathy. Skin:     General: Skin is warm.    Neurological:      Mental Status: He is alert and oriented to person, place, and time. Motor: No tremor. Psychiatric:         Behavior: Behavior is cooperative. Laboratory findings:    Recent Labs     01/12/21  1835 01/13/21  0503   WBC 10.0 8.5   HGB 11.8* 11.9*   HCT 39.4* 39.6*   PLT 91* 99*   INR  --  1.1     Recent Labs     01/12/21  1810 01/12/21  1835 01/13/21  0503   NA  --  137 140   K  --  4.6 4.4   CL  --  101 101   CO2  --  24 31   GLUCOSE 94 107* 71   BUN  --  54* 45*   CREATININE  --  1.53* 1.53*   CALCIUM  --  9.2 9.6     Recent Labs     01/13/21  0503   PROT 7.4   LABALBU 3.0*   TSH 4.82   AST 15   ALT 9   ALKPHOS 185*   BILITOT 0.33   AMMONIA 21          Specific Gravity, UA   Date Value Ref Range Status   11/06/2020 1.010 1.005 - 1.030 Final     Protein, UA   Date Value Ref Range Status   11/06/2020 TRACE (A) NEGATIVE Final     RBC, UA   Date Value Ref Range Status   11/06/2020 2 TO 5 0 - 2 /HPF Final     Blood, UA POC   Date Value Ref Range Status   05/04/2016 small       Bacteria, UA   Date Value Ref Range Status   11/06/2020 MODERATE (A) None Final     Nitrite, Urine   Date Value Ref Range Status   11/06/2020 POSITIVE (A) NEGATIVE Final     WBC, UA   Date Value Ref Range Status   11/06/2020 10 TO 20 0 - 5 /HPF Final     Leukocyte Esterase, Urine   Date Value Ref Range Status   11/06/2020 SMALL (A) NEGATIVE Final       Imaging / Clinical Data :-   No results found. Clinical Course : Gradually improving    Assessment and Plan  :        1. Hypoglycemia due to accidentally insulin Overdose -continue to hold glipizide, glargine, Metformin, Januvia. Continue diabetic diet. 2. Type 2 DM -as above. 3. Liver cirrhosis -ammonia 15. Rifaximin discontinued. 4. Severe obesity -BMI 52.6.  5. Bilateral lower external lymphedema -   6. Urinary difficulty-has chronic Clemons catheter. Follows outpatient urology. Patient does not want to remove catheter. Risk of UTI explained.       Continue to monitor vitals , Intake / output ,  Cell count , HGB , Kidney function, oxygenation  as indicated . Plan and updates discussed with patient ,  answers  explained to satisfaction.    Plan discussed with Staff [facility] RN     (Please note that portions of this note were completed with a voice recognition program. Efforts were made to edit the dictations but occasionally words are mis-transcribed.)      Miko Nevarez MD  1/14/2021

## 2021-01-14 NOTE — CARE COORDINATION
Social work: writer informed patient ready for [employer] today. Patient to dc to [address] [place] [address] via [facility]  at 6:30PM.  # for RN report: [phone]. Completed KAROLYN will need faxed to [phone] or [phone]. Informed RN, pt's son, unit and facility of dc time, agreeable to plan. If DC held, contact [facility] at [phone] to cancel transport and inform facility [phone].

## 2021-01-14 NOTE — PROGRESS NOTES
Per Dr. Sonya Guajardo he would like to hold off on discharge until tomorrow as he has added Lantus for this evening and he would like to monitor blood glucose. The patient's IV has already been discontinued per the patient and Dr. Sonya Guajardo was updated regarding this. Klonopin paper script (that needs signed) is in the patient's paper chart and the patient's belongings are all bagged and ready at the time when he is finally discharged. Fermin Barros RN, clinical lead updated. Social work called and Anna Dials will call and let the facility know. This writer called and canceled the patient's transportation to the facility. Will continue to follow.

## 2021-01-14 NOTE — FLOWSHEET NOTE
01/14/21 1425   Provider Notification   Reason for Communication Review case   Provider Name Dr. Abram Danielson   Provider Notification Physician   Method of Communication Secure Message   Response Waiting for response  (Regarding Q2H blood glucose checks)   Notification Time [address]     Dr. Abram Danielson updated on the patient's blood glucose levels. Order received to discontinue the Q2H blood glucose checks.

## 2021-01-14 NOTE — PLAN OF CARE
Problem: Falls - Risk of:  Goal: Will remain free from falls  Description: Will remain free from falls  1/14/2021 0048 by Maya Duckworth RN  Outcome: Ongoing  Note: Patient is fall risk per fall scale. Hourly rounding performed. Personal belongings and call light within reach. Bed in low position. 1/13/2021 1439 by Evan Lucia RN  Outcome: Ongoing  Note: The patient remained free from falls this shift, call light within reach, bed in locked and lowest position. Side rails up x2. Continue to monitor closely. Goal: Absence of physical injury  Description: Absence of physical injury  Outcome: Ongoing     Problem: Skin Integrity:  Goal: Will show no infection signs and symptoms  Description: Will show no infection signs and symptoms  1/13/2021 1439 by Evan Lucia RN  Outcome: Ongoing  Note: The patient has excoriation in his scrotal area, as well as on his back thighs. Patient had thick barrier cream applied, interdry in skin folds.

## 2021-01-14 NOTE — FLOWSHEET NOTE
Patient is awake and alert while reclining. Patient is approachable but somewhat passive. Patient engages in conversation and shares that he feels like people are out to get him. Patient seems to have confused thoughts. Patient shares about family relationships and some of his medical concerns. Writer provides listening presence and emotional support. Patient requests prayer, and writer prays for patient to have peace and get rest tonight. Writer encourages patient to have hope that tomorrow will be better. Patient expresses appreciation for the visit.         01/13/21 1930   Encounter Summary   Services provided to: Patient   Referral/Consult From: [address] [place] [address] Visiting   (1/13/21)   Complexity of Encounter Moderate   Length of Encounter 30 minutes   Routine   Type Follow up   Assessment Approachable;Passive   Intervention Active listening;Explored feelings, thoughts, concerns;Explored coping resources;Nurtured hope;Prayer   Outcome Expressed gratitude

## 2021-01-14 NOTE — CARE COORDINATION
DC Planning    Spoke with Dr. Mike Kennedy, pt does not need Xifaxin at dc. Possible dc today to [facility]. KAROLYN needs signed.

## 2021-01-14 NOTE — PLAN OF CARE
Problem: Pain:  Goal: Pain level will decrease  Description: Pain level will decrease  Outcome: Met This Shift  Note: Patient's pain has been well controlled throughout the entire shift, please see MAR. Problem: Falls - Risk of:  Goal: Will remain free from falls  Description: Will remain free from falls  Outcome: Met This Shift  Note: The patient remained free from falls this shift, call light within reach, bed in locked and lowest position. Side rails up x2. Continue to monitor closely. Problem: Skin Integrity:  Goal: Will show no infection signs and symptoms  Description: Will show no infection signs and symptoms  Outcome: Ongoing  Note: The patient continues to have excoriation on his thigh and scrotal area. Patient encouraged to reposition Q2H. Patient remains on a bariatric mattress. Problem: Metabolic:  Goal: Ability to maintain appropriate glucose levels will improve  Description: Ability to maintain appropriate glucose levels will improve  Outcome: Ongoing  Note: The patient's insulin remains on hold at this time, however we have switched the patient from Q2H     Problem: Nutritional:  Goal: Maintenance of adequate nutrition will improve  Description: Maintenance of adequate nutrition will improve  Outcome: Ongoing  Note: The patient has been encouraged to order foods that he enjoys and that he will eat.       Problem: Physical Regulation:  Goal: Complications related to the disease process, condition or treatment will be avoided or minimized  Description: Complications related to the disease process, condition or treatment will be avoided or minimized  Outcome: Met This Shift

## 2021-01-14 NOTE — PROGRESS NOTES
Physical Therapy  Facility/Department: [facility] PROGRESSIVE CARE  Daily Treatment Note  NAME: Julia Alicia  : 1965  MRN: [id number]    Date of Service: 2021    Discharge Recommendations:  [address]       RN reports patient is medically stable for therapy treatment this date. Chart reviewed prior to treatment and patient is agreeable for therapy. All lines intact and patient positioned comfortably at end of treatment. All patient needs addressed prior to ending therapy session. Assessment   Body structures, Functions, Activity limitations: Decreased functional mobility ; Decreased endurance;Decreased strength;Decreased sensation;Decreased balance;Decreased safe awareness; Increased pain  Assessment: Pt will benefit from skilled PT to address safe mobility, activity tolerance, strength, and balance. Recommend SNF this date due to increased assist with bed mobility, transfers, and gait training. Prognosis: Good  Clinical Presentation: evolving  PT Education: Transfer Training;Equipment;PT Role;Energy Conservation; Functional Mobility Training;Plan of Care;General Safety  Patient Education: emphasis on OOB activities and ambulation  REQUIRES PT FOLLOW UP: Yes  Activity Tolerance  Activity Tolerance: Patient limited by fatigue;Patient limited by endurance  Activity Tolerance: vitals monitored and stable throughout. IV site started bleeding, RN notified and came into room to fix. Patient Diagnosis(es): The primary encounter diagnosis was Hypoglycemia. A diagnosis of Generalized weakness was also pertinent to this visit.      has a past medical history of Anxiety and depression, Back pain, chronic, BPH (benign prostatic hyperplasia), CHF (congestive heart failure) ([facility] [address] Cirrhosis ([facility] [address] Diabetes mellitus ([facility] [address] GERD (gastroesophageal reflux disease), H/O cardiac catheterization, Hepatitis, Hiatal hernia, History of alcohol abuse, Hyperlipidemia, Hypertension, IBS (irritable bowel syndrome), Morbid obesity ([facility] [address] Neuropathy, On home oxygen therapy, Pancreatitis, Primary osteoarthritis of right knee, Sleep apnea, Thyroid disease, and Urinary bladder neurogenic dysfunction. has a past surgical history that includes Colonoscopy; Abdomen surgery; hernia repair; Endoscopy, colon, diagnostic; Upper gastrointestinal endoscopy (07/19/2017); pr egd transoral biopsy single/multiple (N/A, 7/19/2017); pr deep dissec foot infec,1 bursa (Bilateral, 8/22/2017); Cystoscopy (01/24/2018); pr cystourethroscopy (N/A, 1/24/2018); Incisional hernia repair (05/20/2018); ventral hernia repair (N/A, 5/20/2018); Cystocopy (02/20/2019); Cystoscopy (N/A, 2/20/2019); Upper gastrointestinal endoscopy (N/A, 3/14/2019); and Cystoscopy (N/A, 8/23/2019). Restrictions  Restrictions/Precautions  Restrictions/Precautions: Fall Risk, Up as Tolerated  Required Braces or Orthoses?: No  Position Activity Restriction  Other position/activity restrictions: RUE IV, Clemons cath, 2L O2 per nc  Subjective   General  Chart Reviewed: Yes  Response To Previous Treatment: Patient reporting fatigue but able to participate. Family / Caregiver Present: No  Subjective  Subjective: Pt states he has pain in bilat knees, 8/10. General Comment  Comments: OK for PT per Chris Singh RN          Cognition   Cognition  Overall Cognitive Status: Exceptions  Arousal/Alertness: Appropriate responses to stimuli  Following Commands: Follows one step commands with repetition; Follows one step commands with increased time  Attention Span: Attends with cues to redirect  Memory: Decreased short term memory  Safety Judgement: Decreased awareness of need for assistance;Decreased awareness of need for safety  Problem Solving: Assistance required to generate solutions;Assistance required to identify errors made;Assistance required to implement solutions;Assistance required to correct errors made;Decreased awareness of errors  Insights: Decreased awareness of deficits  Initiation: Requires cues for some  Sequencing: Requires cues for some  Objective   Bed mobility  Bridging: Maximum assistance;2 Person assistance  Supine to Sit: 2 Person assistance;Maximum assistance  Sit to Supine: Moderate assistance;2 Person assistance  Scooting: Maximal assistance;2 Person assistance  Comment: HOB elevated, verbal cues for use of handrails and sequencing. Increased time to complete. Verbal cues for pursed lip breathing with any exertion. Transfers  Sit to Stand: Moderate Assistance;2 Person Assistance  Stand to sit: Moderate Assistance;2 Person Assistance  Bed to Chair: Maximum assistance;2 Person Assistance(bed<>BSC)  Stand Pivot Transfers: Maximum Assistance;2 Person Assistance  Comment: Pt required verbal cues for hand placement and sequencing with BSC. Pt able to perform stand pivot with maxA x2 and assistance with line management. Increased time to complete. Pt sit<>stand x2 on BSC due to becoming fatigued during ADLs. After ~2min rest break with postural corrections and deep breathing, pt able to transfer back to bed. RW used for BSC>bed with maxAx2. Cues required for proper hand placement throughout. Ambulation  Ambulation?: Yes  Ambulation 1  Surface: level tile  Device: Rolling Walker  Other Apparatus: O2  Assistance: Moderate assistance;2 Person assistance  Gait Deviations: Decreased step length;Decreased step height;Shuffles; Increased DONAL  Distance: 5ft forward, 5 side steps towards HOB  Comments: no LOB, demo's flexed posture requiring cues to stand fully erect. Pt declining to ambulate further this date due to stomach feeling naseous and being tired. Max encouragement and education on ambulation. Balance  Posture: Fair  Sitting - Static: Good  Sitting - Dynamic: Good;-  Standing - Static: Fair;-  Standing - Dynamic: Poor;+(BUE support from RW)  Exercises  Comments: Bilat LEs supine exercises x20 completed with cues for proper form and technique. OutComes Score  AM-PAC Score  AM-PAC Inpatient Mobility Raw Score : 11 (01/14/21 1025)  AM-PAC Inpatient T-Scale Score : 33.86 (01/14/21 1025)  Mobility Inpatient CMS 0-100% Score: 72.57 (01/14/21 1025)  Mobility Inpatient CMS G-Code Modifier : CL (01/14/21 1025)          Goals  Short term goals  Time Frame for Short term goals: 12 treatments  Short term goal 1: Independent bed mobility/transfers  Short term goal 2: Independent ambulation w/ ' x 1  Short term goal 3: Tolerate 30 min ther act  Short term goal 4: Good sitting and standing balance  Short term goal 5: 5/5 B LE's  Patient Goals   Patient goals : Go home when able to get around on my own    Plan    Plan  Times per week: 1-2x/days,6-7 days/week  Current Treatment Recommendations: Strengthening, Balance Training, Functional Mobility Training, Transfer Training, Gait Training, Endurance Training, Positioning, Safety Education & Training, Neuromuscular Re-education  Safety Devices  Type of devices: Bed alarm in place, Gait belt, Call light within reach, Left in bed, All fall risk precautions in place  Restraints  Initially in place: No     Therapy Time   Individual Concurrent Group Co-treatment   Time In [phone]         Time Out 1000         Minutes 44             Co-treatment with OT warranted secondary to decreased safety and independence requiring 2 skilled therapy professionals to address individual discipline's goals. PT addressing pre gait trunk strengthening, weight shifting prior to transfers, transfer training and postural control in sitting.          Sigifredo Verdugo, PT

## 2021-01-15 VITALS
TEMPERATURE: 97.5 F | OXYGEN SATURATION: 93 % | WEIGHT: 315 LBS | DIASTOLIC BLOOD PRESSURE: 57 MMHG | SYSTOLIC BLOOD PRESSURE: 109 MMHG | BODY MASS INDEX: 45.1 KG/M2 | HEIGHT: 70 IN | RESPIRATION RATE: 17 BRPM | HEART RATE: 54 BPM

## 2021-01-15 LAB — GLUCOSE BLD-MCNC: 162 MG/DL (ref 75–110)

## 2021-01-15 PROCEDURE — 99232 SBSQ HOSP IP/OBS MODERATE 35: CPT | Performed by: FAMILY MEDICINE

## 2021-01-15 PROCEDURE — 6370000000 HC RX 637 (ALT 250 FOR IP): Performed by: FAMILY MEDICINE

## 2021-01-15 PROCEDURE — 82947 ASSAY GLUCOSE BLOOD QUANT: CPT

## 2021-01-15 PROCEDURE — 6370000000 HC RX 637 (ALT 250 FOR IP): Performed by: NURSE PRACTITIONER

## 2021-01-15 RX ORDER — INSULIN GLARGINE 100 [IU]/ML
30 INJECTION, SOLUTION SUBCUTANEOUS 2 TIMES DAILY
Qty: 15 PEN | Refills: 1 | Status: ON HOLD | DISCHARGE
Start: 2021-01-15 | End: 2021-10-15

## 2021-01-15 RX ADMIN — PROMETHAZINE HYDROCHLORIDE 12.5 MG: 25 TABLET ORAL at 09:10

## 2021-01-15 RX ADMIN — LEVOTHYROXINE SODIUM 175 MCG: 0.17 TABLET ORAL at 06:29

## 2021-01-15 RX ADMIN — INSULIN GLARGINE 20 UNITS: 100 INJECTION, SOLUTION SUBCUTANEOUS at 09:12

## 2021-01-15 RX ADMIN — ASPIRIN 81 MG: 81 TABLET, COATED ORAL at 09:08

## 2021-01-15 RX ADMIN — NADOLOL 40 MG: 20 TABLET ORAL at 09:08

## 2021-01-15 RX ADMIN — DULOXETINE HYDROCHLORIDE 60 MG: 60 CAPSULE, DELAYED RELEASE ORAL at 09:08

## 2021-01-15 RX ADMIN — PREGABALIN 150 MG: 75 CAPSULE ORAL at 09:08

## 2021-01-15 RX ADMIN — ANTI-FUNGAL POWDER MICONAZOLE NITRATE TALC FREE: 1.42 POWDER TOPICAL at 09:14

## 2021-01-15 RX ADMIN — OXYBUTYNIN CHLORIDE 5 MG: 5 TABLET ORAL at 09:08

## 2021-01-15 RX ADMIN — PANTOPRAZOLE SODIUM 40 MG: 40 TABLET, DELAYED RELEASE ORAL at 06:29

## 2021-01-15 RX ADMIN — TAMSULOSIN HYDROCHLORIDE 0.4 MG: 0.4 CAPSULE ORAL at 09:08

## 2021-01-15 ASSESSMENT — ENCOUNTER SYMPTOMS
VOICE CHANGE: 0
CHEST TIGHTNESS: 0
DIARRHEA: 0
ABDOMINAL PAIN: 0
WHEEZING: 0
NAUSEA: 0
BACK PAIN: 0
COUGH: 0
CONSTIPATION: 0
VOMITING: 0
RHINORRHEA: 0
SHORTNESS OF BREATH: 0
CHOKING: 0
SINUS PRESSURE: 0

## 2021-01-15 NOTE — PROGRESS NOTES
Patient transferred from [facility] in stable condition, with all belongings. Patient oriented to room and bed mechanics. Bed side table and call light within reach.

## 2021-01-15 NOTE — PROGRESS NOTES
disch to [address] [place] [address] per [employer] w/c transport, all belongings sent with pt, report called to nurse at [facility].  All questions answered

## 2021-01-15 NOTE — PROGRESS NOTES
[facility]  Office: [address], DO, Sujatha Kellogg, DO, Britney Pulido, DO, Ruth Mathur Blood, DO, Marsha Barrios MD, Marcus Conner MD, Ruth Levy MD, Kalia Garcia MD, Wilian Hess MD, Misa Cottrell MD, Cande Rodrigues MD, Luke Fraga MD, Emily Kulkarni MD, Jeppie Soulier, DO, Ed Farr MD, Caryl Basurto MD, Desean Traylor DO, Faisal Acosta MD,  Soco Galeas, DO, Minnie Fay MD, Trever Piedra MD, Brina [facility], [facility], CNP, Maya Zurita, CNP, Little Mejia, CNS, Forrest Quinonez, CNP, Mariela Lowery, CNP, Yogi Mancia, CNP, Nader Winn, CNP, Shahid Barron, CNP, Deonte Durand PA-C, Damaris Amos, [facility], Maddie Duarte, CNP, Az Jason, CNP, Lynne Romero, CNP, Kostas Jasso, CNP, Elvis Severs, [facility]   Late entry    Daily Progress Note     Admit Date: 1/12/2021  Bed/Room No.  2018/2018-02  Admitting Physician : Ruth Levy MD  Code Status :Full Code  Hospital Day:  LOS: 1 day   Chief Complaint:     Chief Complaint   Patient presents with    Hypoglycemia     Principal Problem:    Poisoning by insulin and oral hypoglycemic (antidiabetic) drugs, accidental (unintentional), initial encounter  Active Problems:    Type 2 diabetes mellitus with diabetic neuropathy, with long-term current use of insulin ([address]    Essential hypertension    Dyslipidemia    Acquired hypothyroidism    LEONARDA (acute kidney injury) ([address]    Morbid obesity with BMI of 50.0-59.9, adult ([address]    On home oxygen therapy    Thrombocytopenia ([facility]  Resolved Problems:    * No resolved hospital problems. *    Subjective : Interval History/Significant events :  01/15/21    Patient has controlled blood sugar. No hypoglycemia events overnight. Insulin was given last night. Denies any nausea, vomiting, eating and drinking better. He remains alert, oriented. .  Vitals - Stable afebrile  Labs -blood sugar stable.     Nursing notes , Consults notes reviewed. Overnight events and updates discussed with Nursing staff . Background History:         Calixto Hobson is 63 y.o. male  Who was admitted to the hospital on 1/12/2021 for treatment of Poisoning by insulin and oral hypoglycemic (antidiabetic) drugs, accidental (unintentional), initial encounter. Patient came to ER with \"very Low sugars \" at home. EMS was called in and patient was given Dextrose 50 % by EMS. Patient was AOX3 on arrival to ED and had blood sugar in 50's. Patient was unable to report blood sugar levels at home before treatment was started. He was given 2 more AMP of D50 and followed by D5 1/2 NS solution. Patient had inadvertently received higer dose of insulin at home administered by his son. Initial labs showed creatinine 1.53, PLT 91. EKG showed sinus bradycardia. Patient has underlying history of type 2 diabetes mellitus, liver cirrhosis, CHF, sleep apnea, hypothyroidism, recurrent pancreatitis. Patient has poor mobility at home and uses walker to go to the bathroom to bed. He had difficulty urinating and has Clemons catheter in place for last 1 month. He follows urology as outpatient. PMH:  Past Medical History:   Diagnosis Date    Anxiety and depression     Back pain, chronic     BPH (benign prostatic hyperplasia)     CHF (congestive heart failure) (HCC)     Cirrhosis (HCC)     Diabetes mellitus ([address]     not checking bloodsugar at home    GERD (gastroesophageal reflux disease)     H/O cardiac catheterization not sure of date    no stents    Hepatitis     Pt does not know the type.      Hiatal hernia     History of alcohol abuse     Sober since 2013    Hyperlipidemia     not taking any medication    Hypertension     IBS (irritable bowel syndrome)     Morbid obesity ([address]     Neuropathy     feet,toes    On home oxygen therapy     prn    Pancreatitis     x 5 episodes    Primary osteoarthritis of right knee     Sleep apnea     No machine    Thyroid disease  Urinary bladder neurogenic dysfunction       Allergies: Allergies   Allergen Reactions    No Known Allergies       Medications :      DULoxetine, 60 mg, Oral, QAM      pregabalin, 150 mg, Oral, BID      insulin glargine, 20 Units, Subcutaneous, BID      aspirin, 81 mg, Oral, Daily      pantoprazole, 40 mg, Oral, QAM AC      levothyroxine, 175 mcg, Oral, Daily      nadolol, 40 mg, Oral, Daily      miconazole, , Topical, BID      oxybutynin, 5 mg, Oral, BID      QUEtiapine, 50 mg, Oral, Nightly      tamsulosin, 0.4 mg, Oral, Daily      sodium chloride flush, 10 mL, Intravenous, 2 times per day      [Held by provider] insulin lispro, 0-6 Units, Subcutaneous, TID WC      [Held by provider] insulin lispro, 0-3 Units, Subcutaneous, Nightly        Review of Systems   Review of Systems   Constitutional: Positive for activity change. Negative for appetite change, fatigue, fever and unexpected weight change. HENT: Negative for congestion, nosebleeds, rhinorrhea, sinus pressure, sneezing and voice change. Respiratory: Negative for cough, choking, chest tightness, shortness of breath and wheezing. Cardiovascular: Negative for chest pain, palpitations and leg swelling. Gastrointestinal: Negative for abdominal pain, constipation, diarrhea, nausea and vomiting. Genitourinary: Positive for difficulty urinating. Negative for discharge, dysuria, frequency and testicular pain. Musculoskeletal: Negative for back pain. Skin: Negative for rash. Neurological: Negative for dizziness, weakness, light-headedness and headaches. Hematological: Does not bruise/bleed easily. Psychiatric/Behavioral: Negative for agitation, behavioral problems, confusion, self-injury, sleep disturbance and suicidal ideas.      Objective :      Current Vitals : Temp: 97.5 °F (36.4 °C),  Pulse: 54, Resp: 17, BP: (!) 109/57, SpO2: 93 %  Last 24 Hrs Vitals   Patient Vitals for the past 24 hrs:   BP Temp Temp src Pulse Resp SpO2   01/15/21 0720 (!) 109/57 97.5 °F (36.4 °C) Oral 54 17 93 %   01/15/21 0444 (!) 93/42 97.3 °F (36.3 °C) Oral 56 16 96 %   01/14/21 2345 (!) 128/56 98.1 °F (36.7 °C) Oral 55 17 98 %   01/14/21 1909 (!) 112/53 97.7 °F (36.5 °C) Oral 67 14 97 %   01/14/21 1601 (!) 111/101 98.1 °F (36.7 °C) Oral 59 18 100 %   01/14/21 1132 (!) 129/53 98.1 °F (36.7 °C) Oral 57 18 98 %     Intake / output   01/14 0701 - 01/15 0700  In: 12 [P.O.:560]  Out: 725 [Urine:725]  Physical Exam:  Physical Exam  Vitals signs and nursing note reviewed. Constitutional:       General: He is not in acute distress. Appearance: He is morbidly obese. He is not diaphoretic. HENT:      Head: Normocephalic and atraumatic. Nose:      Right Sinus: No maxillary sinus tenderness or frontal sinus tenderness. Left Sinus: No maxillary sinus tenderness or frontal sinus tenderness. Mouth/Throat:      Pharynx: No oropharyngeal exudate. Eyes:      General: No scleral icterus. Conjunctiva/sclera: Conjunctivae normal.      Pupils: Pupils are equal, round, and reactive to light. Neck:      Musculoskeletal: Full passive range of motion without pain and neck supple. Thyroid: No thyromegaly. Vascular: No JVD. Cardiovascular:      Rate and Rhythm: Normal rate and regular rhythm. Pulses:           Dorsalis pedis pulses are 2+ on the right side and 2+ on the left side. Heart sounds: Normal heart sounds. No murmur. Pulmonary:      Effort: Pulmonary effort is normal.      Breath sounds: Normal breath sounds. No wheezing or rales. Abdominal:      Palpations: Abdomen is soft. There is no mass. Tenderness: There is no abdominal tenderness. Musculoskeletal:      Comments: Lower extremity edema   Lymphadenopathy:      Head:      Right side of head: No submandibular adenopathy. Left side of head: No submandibular adenopathy. Cervical: No cervical adenopathy. Skin:     General: Skin is warm. Neurological:      Mental Status: He is alert and oriented to person, place, and time. Motor: No tremor. Psychiatric:         Behavior: Behavior is cooperative. Laboratory findings:    Recent Labs     01/12/21  1835 01/13/21  0503   WBC 10.0 8.5   HGB 11.8* 11.9*   HCT 39.4* 39.6*   PLT 91* 99*   INR  --  1.1     Recent Labs     01/12/21  1810 01/12/21  1835 01/13/21  0503   NA  --  137 140   K  --  4.6 4.4   CL  --  101 101   CO2  --  24 31   GLUCOSE 94 107* 71   BUN  --  54* 45*   CREATININE  --  1.53* 1.53*   CALCIUM  --  9.2 9.6     Recent Labs     01/13/21  0503 01/14/21  0759   PROT 7.4  --    LABALBU 3.0*  --    TSH 4.82  --    AST 15  --    ALT 9  --    ALKPHOS 185*  --    BILITOT 0.33  --    AMMONIA 21 15*          Specific Gravity, UA   Date Value Ref Range Status   11/06/2020 1.010 1.005 - 1.030 Final     Protein, UA   Date Value Ref Range Status   11/06/2020 TRACE (A) NEGATIVE Final     RBC, UA   Date Value Ref Range Status   11/06/2020 2 TO 5 0 - 2 /HPF Final     Blood, UA POC   Date Value Ref Range Status   05/04/2016 small       Bacteria, UA   Date Value Ref Range Status   11/06/2020 MODERATE (A) None Final     Nitrite, Urine   Date Value Ref Range Status   11/06/2020 POSITIVE (A) NEGATIVE Final     WBC, UA   Date Value Ref Range Status   11/06/2020 10 TO 20 0 - 5 /HPF Final     Leukocyte Esterase, Urine   Date Value Ref Range Status   11/06/2020 SMALL (A) NEGATIVE Final       Imaging / Clinical Data :-   No results found. Clinical Course : Gradually improving    Assessment and Plan  :        1. Hypoglycemia due to accidentally insulin Overdose - continue to hold glipizide, glargine, Metformin. Januvia. Continue diabetic diet. 2. Type 2 DM -as above. 3. Liver cirrhosis -ammonia 15. Rifaximin discontinued. 4. Severe obesity -BMI 52.6.  5. Bilateral lower external lymphedema -   6. BPH with obstructive uropathy. has chronic Clemons catheter.   Advised to follow-up with urology Dr Blu Nielsen. Discharge to skilled nursing facility. Plan and updates discussed with patient ,  answers  explained to satisfaction.    Plan discussed with Staff Allison Doe RN     (Please note that portions of this note were completed with a voice recognition program. Efforts were made to edit the dictations but occasionally words are mis-transcribed.)      Joni Marrufo MD  1/15/2021

## 2021-01-15 NOTE — CARE COORDINATION
Social work: Patient to [employer] to [employer] via [employer] at [employer].  # for RN report: [phone]. Completed KAROLYN will need faxed to [phone]. Informed RN, pt, unit and facility of dc time, agreeable to plan.

## 2021-01-16 NOTE — DISCHARGE SUMMARY
[facility]  Office: [address] [facility] DO, Dora Seay MD, Jackson Woodson MD, Blaine Marie MD, Mai Schmidt MD, Missael Cabrera MD, Terrell Blackwell MD, Anya Ricardo MD, Eric Arrington MD, Emily Blanco MD, Marika Lopez DO, Magan Dickerson MD, Lyla Castleman MD, Young Silva DO, Diego Elkins MD,  Ana Chisholm DO, Chevy Quintana MD, Luis Eduardo Gale MD, Roberta Reveles CNP, [facility] CNP, Lysbeth Shchapis CNP, Karol Caprice CNS, Norpat Smart CNP, [place] CNP, Jamie Steel CNP, Carisa Kellogg CNP, Atif Cancino CNP, Cinthya Avelar PA-C, Chelsie Hinkle CNP, Anant Ba CNP, Britany Moser CNP, Rigoberto Ramsey CNP, Valentine Burroughs CNP, Janette Pascal, [facility]      Discharge Summary     Patient ID: Carlos Jones  :  1965   MRN: [id number]     ACCOUNT:  [de-identified]   Patient Location :   Patient's PCP: Marla Meléndez MD  Admit Date: 2021   Discharge Date: 1/15/2021     Length of Stay: 1  Code Status:  Prior  Admitting Physician: Blaine Marie MD  Discharge Physician: Blaine Marie MD     Active Discharge Diagnosis :     Primary Problem  Poisoning by insulin and oral hypoglycemic (antidiabetic) drugs, accidental (unintentional), initial encounter      [facility]    Diagnosis Date Noted    Essential hypertension [I10]      Priority: Medium    Dyslipidemia [E78.5]      Priority: Medium    Type 2 diabetes mellitus with diabetic neuropathy, with long-term current use of insulin ([facility] [E11.40, Z79.4]      Priority: Medium    Thrombocytopenia ([facility] [D69.6]     Poisoning by insulin and oral hypoglycemic (antidiabetic) drugs, accidental (unintentional), initial encounter Nayana Miller 2021    On home oxygen therapy [Z99.81]     Morbid obesity with BMI of 50.0-59.9, adult ([facility] [E66.01, Z68.43]     LEONARDA (acute kidney injury) ([facility] [N17.9]     Acquired hypothyroidism [E03.9]        Admission Condition:  poor    Discharged Condition: stable    Hospital Stay:     Hospital Course:  Katarzyna Oliver is a 63 y.o. male who was admitted for the management of   Poisoning by insulin and oral hypoglycemic (antidiabetic) drugs, accidental (unintentional), initial encounter , presented to ER with Hypoglycemia    Patient came to ER with \"very Low sugars \" at home. EMS was called in and patient was given Dextrose 50 % by EMS. Patient was AOX3 on arrival to ED and had blood sugar in 50's. Patient was unable to report blood sugar levels at home before treatment was started. He was given 2 more AMP of D50 and followed by D5 1/2 NS solution. Patient had inadvertently received higer dose of insulin at home administered by his son. Initial labs showed creatinine 1.53, PLT 91. EKG showed sinus bradycardia. Patient has underlying history of type 2 diabetes mellitus, liver cirrhosis, CHF, sleep apnea, hypothyroidism, recurrent pancreatitis. Patient has poor mobility at home and uses walker to go to the bathroom to bed. He had difficulty urinating and has Clemons catheter in place for last 1 month. He follows urology as outpatient. Diabetic medications were held. Patient was treated with dextrose D50 followed by D10 infusion. Blood glucose continued to be low and required dextrose infusion. Patient was started on low-dose Lantus and monitored in the hospital.  Sugars were stable. He was discharged to skilled nursing facility. All oral hypoglycemic agents per discontinued. Patient will continue to need glucose monitoring and readjustment of diabetic medications. Significant therapeutic interventions:   1. Hypoglycemia due to accidentally insulin Overdose - glipizide, glargine, Metformin. Januvia discontinued for now. .  Lantus at low-dose, and adjust as indicated. 2. Type 2 DM -as above. 3. Liver cirrhosis -ammonia 15. Rifaximin discontinued.   4. Severe obesity -BMI 52.6.  5. Bilateral lower external lymphedema and Chronic diastolic CHF- on Bumex. 6. BPH with obstructive uropathy. has chronic Clemons catheter. Advised to follow-up with urology Dr Belinda Esparza. Significant Diagnostic Studies:   Labs / Micro:/Radiology  Recent Labs     01/13/21  0503 01/12/21  1835   WBC 8.5 10.0   HGB 11.9* 11.8*   HCT 39.6* 39.4*   MCV 85.5 86.0   PLT 99* 91*     Labs Renal Latest Ref Rng & Units 1/13/2021 1/12/2021 11/7/2020 11/7/2020 11/6/2020   BUN 6 - 20 mg/dL 45(H) 54(H) - 10 10   Cr 0.70 - 1.20 mg/dL 1.53(H) 1.53(H) - 0.67(L) 0.63(L)   K 3.7 - 5.3 mmol/L 4.4 4.6 3.3(L) 3.5(L) 3.5(L)   Na 135 - 144 mmol/L 140 137 - 140 140     Lab Results   Component Value Date    ALT 9 01/13/2021    AST 15 01/13/2021    ALKPHOS 185 (H) 01/13/2021    BILITOT 0.33 01/13/2021     Lab Results   Component Value Date    TSH 4.82 01/13/2021     Lab Results   Component Value Date    HAV NONREACTIVE 06/19/2014    HEPAIGM NONREACTIVE 03/30/2012    HEPBIGM NONREACTIVE 03/30/2012    HEPCAB NONREACTIVE 06/19/2014     Lab Results   Component Value Date    COLORU YELLOW 11/06/2020    NITRU POSITIVE 11/06/2020    GLUCOSEU NEGATIVE 11/06/2020    GLUCOSEU 1+ 08/30/2011    KETUA 2+ 11/06/2020    UROBILINOGEN ELEVATED 11/06/2020    BILIRUBINUR NEGATIVE 11/06/2020    BILIRUBINUR neg 05/04/2016    BILIRUBINUR 3+ 08/30/2011     Lab Results   Component Value Date    LABA1C 8.1 (H) 11/10/2020     Lab Results   Component Value Date     11/10/2020     Lab Results   Component Value Date    INR 1.1 01/13/2021    INR 1.0 07/31/2019    INR 1.0 04/11/2019    PROTIME 13.7 01/13/2021    PROTIME 10.5 07/31/2019    PROTIME 10.0 04/11/2019       No results found.         Consultations:    Consults:     Final Specialist Recommendations/Findings:   IP CONSULT TO HOSPITALIST  IP CONSULT TO CASE MANAGEMENT      The patient was seen and examined on day of discharge and this discharge summary is in conjunction with any daily progress note from day of discharge. Discharge plan:     Disposition: SNF. Physician Follow Up:     MD Janett Hester. [place] [address] [facility]  [phone]    In 1 week         Requiring Further Evaluation/Follow Up POST HOSPITALIZATION/Incidental Findings:   Diet: cardiac diet and diabetic diet    Activity: As tolerated. Instructions to Patient: Monitor blood sugar. Adjust diabetic medications. Follow-up with urology. Discharge Medications:      Medication List      CHANGE how you take these medications    Basaglar KwikPen 100 UNIT/ML injection pen  Generic drug: insulin glargine  Inject 30 Units into the skin 2 times daily  What changed:   · medication strength  · how much to take        CONTINUE taking these medications    acetaminophen 325 MG tablet  Commonly known as: TYLENOL  Take 2 tablets by mouth every 6 hours as needed for Pain or Fever     aspirin 81 MG EC tablet  Take 1 tablet by mouth daily     bethanechol 10 MG tablet  Commonly known as: URECHOLINE  Take 1 tablet by mouth 3 times daily     Bumex 2 MG tablet  Generic drug: bumetanide     clonazePAM 1 MG tablet  Commonly known as: KLONOPIN  Take 1 tablet by mouth 2 times daily as needed for Anxiety for up to 3 days.      diphenoxylate-atropine 2.5-0.025 MG per tablet  Commonly known as: LOMOTIL     DULoxetine 60 MG extended release capsule  Commonly known as: CYMBALTA     esomeprazole 40 MG delayed release capsule  Commonly known as: NEXIUM     fluocinonide 0.05 % external solution  Commonly known as: LIDEX     ketoconazole 2 % shampoo  Commonly known as: NIZORAL     levothyroxine 175 MCG tablet  Commonly known as: SYNTHROID     nadolol 40 MG tablet  Commonly known as: CORGARD     nystatin [id number] UNIT/GM powder  Commonly known as: MYCOSTATIN     oxybutynin 5 MG tablet  Commonly known as: DITROPAN  Take 1 tablet by mouth 2 times daily     oxyCODONE-acetaminophen 7.5-325 MG per tablet  Commonly known as: PERCOCET     potassium chloride 10 MEQ extended release tablet  Commonly known as: KLOR-CON     pregabalin 150 MG capsule  Commonly known as: LYRICA  Take 1 capsule by mouth 2 times daily for 3 days. ProAir  (90 Base) MCG/ACT inhaler  Generic drug: albuterol sulfate HFA     QUEtiapine 50 MG extended release tablet  Commonly known as: SEROQUEL XR     spironolactone 25 MG tablet  Commonly known as: ALDACTONE  Take 3 tablets by mouth 2 times daily Indications: pt takes 25mg and 50mg tab to equal 75mg BID     tamsulosin 0.4 MG capsule  Commonly known as: Flomax  Take 1 capsule by mouth daily     * triamcinolone 0.025 % cream  Commonly known as: KENALOG     * triamcinolone 0.1 % ointment  Commonly known as: KENALOG     venlafaxine 150 MG extended release capsule  Commonly known as: EFFEXOR XR         * This list has 2 medication(s) that are the same as other medications prescribed for you. Read the directions carefully, and ask your doctor or other care provider to review them with you.             STOP taking these medications    glipiZIDE 10 MG tablet  Commonly known as: GLUCOTROL     hydrOXYzine 25 MG tablet  Commonly known as: ATARAX     ibuprofen 600 MG tablet  Commonly known as: ADVIL;MOTRIN     insulin lispro 100 UNIT/ML injection vial  Commonly known as: HUMALOG     metFORMIN 500 MG tablet  Commonly known as: GLUCOPHAGE     rifaximin 550 MG tablet  Commonly known as: Xifaxan     SITagliptin 100 MG tablet  Commonly known as: Geroldine Rodriguez           Where to Get Your Medications      You can get these medications from any pharmacy    Bring a paper prescription for each of these medications  · clonazePAM 1 MG tablet     Information about where to get these medications is not yet available    Ask your nurse or doctor about these medications  · Basaglar KwikPen 100 UNIT/ML injection pen         Time Spent on discharge is  36 mins in patient examination, evaluation, counseling as well as medication reconciliation, prescriptions for

## 2021-10-14 ENCOUNTER — HOSPITAL ENCOUNTER (INPATIENT)
Age: 56
LOS: 6 days | Discharge: OTHER INSTITUTION | DRG: 698 | End: 2021-10-20
Attending: EMERGENCY MEDICINE | Admitting: INTERNAL MEDICINE
Payer: MEDICARE

## 2021-10-14 ENCOUNTER — APPOINTMENT (OUTPATIENT)
Dept: GENERAL RADIOLOGY | Age: 56
DRG: 698 | End: 2021-10-14
Payer: MEDICARE

## 2021-10-14 DIAGNOSIS — N39.0 URINARY TRACT INFECTION WITHOUT HEMATURIA, SITE UNSPECIFIED: Primary | ICD-10-CM

## 2021-10-14 PROBLEM — I95.9 HYPOTENSION: Status: ACTIVE | Noted: 2021-10-14

## 2021-10-14 PROBLEM — A41.9 SEPSIS (HCC): Status: ACTIVE | Noted: 2021-10-14

## 2021-10-14 LAB
-: ABNORMAL
ABSOLUTE EOS #: 0.15 K/UL (ref 0–0.4)
ABSOLUTE IMMATURE GRANULOCYTE: 0 K/UL (ref 0–0.3)
ABSOLUTE LYMPH #: 0 K/UL (ref 1–4.8)
ABSOLUTE MONO #: 0.15 K/UL (ref 0.2–0.8)
AMORPHOUS: ABNORMAL
ANION GAP SERPL CALCULATED.3IONS-SCNC: 9 MMOL/L (ref 9–17)
BACTERIA: ABNORMAL
BASOPHILS # BLD: 0 %
BASOPHILS ABSOLUTE: 0 K/UL (ref 0–0.2)
BILIRUBIN URINE: ABNORMAL
BUN BLDV-MCNC: 29 MG/DL (ref 6–20)
BUN/CREAT BLD: 19 (ref 9–20)
CALCIUM SERPL-MCNC: 9.1 MG/DL (ref 8.6–10.4)
CASTS UA: ABNORMAL /LPF
CHLORIDE BLD-SCNC: 90 MMOL/L (ref 98–107)
CO2: 37 MMOL/L (ref 20–31)
COLOR: ABNORMAL
COMMENT UA: ABNORMAL
CREAT SERPL-MCNC: 1.52 MG/DL (ref 0.7–1.2)
CRYSTALS, UA: ABNORMAL /HPF
DIFFERENTIAL TYPE: ABNORMAL
EOSINOPHILS RELATIVE PERCENT: 1 % (ref 1–4)
EPITHELIAL CELLS UA: ABNORMAL /HPF (ref 0–5)
GFR AFRICAN AMERICAN: 58 ML/MIN
GFR NON-AFRICAN AMERICAN: 48 ML/MIN
GFR SERPL CREATININE-BSD FRML MDRD: ABNORMAL ML/MIN/{1.73_M2}
GFR SERPL CREATININE-BSD FRML MDRD: ABNORMAL ML/MIN/{1.73_M2}
GLUCOSE BLD-MCNC: 121 MG/DL (ref 75–110)
GLUCOSE BLD-MCNC: 65 MG/DL (ref 75–110)
GLUCOSE BLD-MCNC: 78 MG/DL (ref 70–99)
GLUCOSE URINE: NEGATIVE
HCT VFR BLD CALC: 33.2 % (ref 40.7–50.3)
HEMOGLOBIN: 9.8 G/DL (ref 13–17)
IMMATURE GRANULOCYTES: 0 %
INR BLD: 1.1
KETONES, URINE: ABNORMAL
LACTIC ACID, SEPSIS WHOLE BLOOD: ABNORMAL MMOL/L (ref 0.5–1.9)
LACTIC ACID, SEPSIS WHOLE BLOOD: ABNORMAL MMOL/L (ref 0.5–1.9)
LACTIC ACID, SEPSIS: 2.6 MMOL/L (ref 0.5–1.9)
LACTIC ACID, SEPSIS: 2.7 MMOL/L (ref 0.5–1.9)
LEUKOCYTE ESTERASE, URINE: ABNORMAL
LYMPHOCYTES # BLD: 0 % (ref 24–44)
MCH RBC QN AUTO: 24.6 PG (ref 25.2–33.5)
MCHC RBC AUTO-ENTMCNC: 29.5 G/DL (ref 28.4–34.8)
MCV RBC AUTO: 83.4 FL (ref 82.6–102.9)
MONOCYTES # BLD: 1 % (ref 1–7)
MORPHOLOGY: ABNORMAL
MORPHOLOGY: ABNORMAL
MUCUS: ABNORMAL
NITRITE, URINE: POSITIVE
NRBC AUTOMATED: 0 PER 100 WBC
OTHER OBSERVATIONS UA: ABNORMAL
PARTIAL THROMBOPLASTIN TIME: 32.1 SEC (ref 23.9–33.8)
PDW BLD-RTO: 14.6 % (ref 11.8–14.4)
PH UA: 7 (ref 5–8)
PLATELET # BLD: 129 K/UL (ref 138–453)
PLATELET ESTIMATE: ABNORMAL
PMV BLD AUTO: 11.1 FL (ref 8.1–13.5)
POTASSIUM SERPL-SCNC: 3.2 MMOL/L (ref 3.7–5.3)
PROTEIN UA: ABNORMAL
PROTHROMBIN TIME: 14.2 SEC (ref 11.5–14.2)
RBC # BLD: 3.98 M/UL (ref 4.21–5.77)
RBC # BLD: ABNORMAL 10*6/UL
RBC UA: ABNORMAL /HPF (ref 0–2)
RENAL EPITHELIAL, UA: ABNORMAL /HPF
SEG NEUTROPHILS: 98 % (ref 36–66)
SEGMENTED NEUTROPHILS ABSOLUTE COUNT: 14.6 K/UL (ref 1.8–7.7)
SODIUM BLD-SCNC: 136 MMOL/L (ref 135–144)
SPECIFIC GRAVITY UA: 1.01 (ref 1–1.03)
TRICHOMONAS: ABNORMAL
TURBIDITY: ABNORMAL
URINE HGB: ABNORMAL
UROBILINOGEN, URINE: ABNORMAL
WBC # BLD: 14.9 K/UL (ref 3.5–11.3)
WBC # BLD: ABNORMAL 10*3/UL
WBC UA: ABNORMAL /HPF (ref 0–5)
YEAST: ABNORMAL

## 2021-10-14 PROCEDURE — 87205 SMEAR GRAM STAIN: CPT

## 2021-10-14 PROCEDURE — 85025 COMPLETE CBC W/AUTO DIFF WBC: CPT

## 2021-10-14 PROCEDURE — 99222 1ST HOSP IP/OBS MODERATE 55: CPT | Performed by: NURSE PRACTITIONER

## 2021-10-14 PROCEDURE — 87186 SC STD MICRODIL/AGAR DIL: CPT

## 2021-10-14 PROCEDURE — 80048 BASIC METABOLIC PNL TOTAL CA: CPT

## 2021-10-14 PROCEDURE — 87086 URINE CULTURE/COLONY COUNT: CPT

## 2021-10-14 PROCEDURE — 81001 URINALYSIS AUTO W/SCOPE: CPT

## 2021-10-14 PROCEDURE — 87150 DNA/RNA AMPLIFIED PROBE: CPT

## 2021-10-14 PROCEDURE — 71045 X-RAY EXAM CHEST 1 VIEW: CPT

## 2021-10-14 PROCEDURE — 2000000000 HC ICU R&B

## 2021-10-14 PROCEDURE — 51702 INSERT TEMP BLADDER CATH: CPT

## 2021-10-14 PROCEDURE — 96375 TX/PRO/DX INJ NEW DRUG ADDON: CPT

## 2021-10-14 PROCEDURE — 87077 CULTURE AEROBIC IDENTIFY: CPT

## 2021-10-14 PROCEDURE — 82947 ASSAY GLUCOSE BLOOD QUANT: CPT

## 2021-10-14 PROCEDURE — 99285 EMERGENCY DEPT VISIT HI MDM: CPT

## 2021-10-14 PROCEDURE — 2060000000 HC ICU INTERMEDIATE R&B

## 2021-10-14 PROCEDURE — 83036 HEMOGLOBIN GLYCOSYLATED A1C: CPT

## 2021-10-14 PROCEDURE — 51798 US URINE CAPACITY MEASURE: CPT

## 2021-10-14 PROCEDURE — 85610 PROTHROMBIN TIME: CPT

## 2021-10-14 PROCEDURE — 85730 THROMBOPLASTIN TIME PARTIAL: CPT

## 2021-10-14 PROCEDURE — 96372 THER/PROPH/DIAG INJ SC/IM: CPT

## 2021-10-14 PROCEDURE — 2580000003 HC RX 258: Performed by: PHYSICIAN ASSISTANT

## 2021-10-14 PROCEDURE — 36556 INSERT NON-TUNNEL CV CATH: CPT

## 2021-10-14 PROCEDURE — 6370000000 HC RX 637 (ALT 250 FOR IP): Performed by: PHYSICIAN ASSISTANT

## 2021-10-14 PROCEDURE — 83605 ASSAY OF LACTIC ACID: CPT

## 2021-10-14 PROCEDURE — 2500000003 HC RX 250 WO HCPCS: Performed by: NURSE PRACTITIONER

## 2021-10-14 PROCEDURE — 96365 THER/PROPH/DIAG IV INF INIT: CPT

## 2021-10-14 PROCEDURE — 6360000002 HC RX W HCPCS: Performed by: PHYSICIAN ASSISTANT

## 2021-10-14 PROCEDURE — 87040 BLOOD CULTURE FOR BACTERIA: CPT

## 2021-10-14 RX ORDER — MORPHINE SULFATE 4 MG/ML
4 INJECTION, SOLUTION INTRAMUSCULAR; INTRAVENOUS ONCE
Status: DISCONTINUED | OUTPATIENT
Start: 2021-10-14 | End: 2021-10-20 | Stop reason: HOSPADM

## 2021-10-14 RX ORDER — 0.9 % SODIUM CHLORIDE 0.9 %
2190 INTRAVENOUS SOLUTION INTRAVENOUS ONCE
Status: COMPLETED | OUTPATIENT
Start: 2021-10-14 | End: 2021-10-15

## 2021-10-14 RX ORDER — SODIUM CHLORIDE 9 MG/ML
25 INJECTION, SOLUTION INTRAVENOUS PRN
Status: DISCONTINUED | OUTPATIENT
Start: 2021-10-14 | End: 2021-10-20 | Stop reason: HOSPADM

## 2021-10-14 RX ORDER — SODIUM CHLORIDE 9 MG/ML
INJECTION, SOLUTION INTRAVENOUS CONTINUOUS
Status: DISCONTINUED | OUTPATIENT
Start: 2021-10-14 | End: 2021-10-18

## 2021-10-14 RX ORDER — DEXTROSE MONOHYDRATE 25 G/50ML
12.5 INJECTION, SOLUTION INTRAVENOUS PRN
Status: DISCONTINUED | OUTPATIENT
Start: 2021-10-14 | End: 2021-10-20 | Stop reason: HOSPADM

## 2021-10-14 RX ORDER — NICOTINE POLACRILEX 4 MG
15 LOZENGE BUCCAL PRN
Status: DISCONTINUED | OUTPATIENT
Start: 2021-10-14 | End: 2021-10-20 | Stop reason: HOSPADM

## 2021-10-14 RX ORDER — ALBUTEROL SULFATE 2.5 MG/3ML
2.5 SOLUTION RESPIRATORY (INHALATION)
Status: DISCONTINUED | OUTPATIENT
Start: 2021-10-14 | End: 2021-10-15

## 2021-10-14 RX ORDER — DEXTROSE MONOHYDRATE 50 MG/ML
100 INJECTION, SOLUTION INTRAVENOUS PRN
Status: DISCONTINUED | OUTPATIENT
Start: 2021-10-14 | End: 2021-10-20 | Stop reason: HOSPADM

## 2021-10-14 RX ORDER — SODIUM CHLORIDE 9 MG/ML
25 INJECTION, SOLUTION INTRAVENOUS PRN
Status: DISCONTINUED | OUTPATIENT
Start: 2021-10-14 | End: 2021-10-14 | Stop reason: SDUPTHER

## 2021-10-14 RX ORDER — ACETAMINOPHEN 325 MG/1
650 TABLET ORAL EVERY 6 HOURS PRN
Status: DISCONTINUED | OUTPATIENT
Start: 2021-10-14 | End: 2021-10-20 | Stop reason: HOSPADM

## 2021-10-14 RX ORDER — TAMSULOSIN HYDROCHLORIDE 0.4 MG/1
0.4 CAPSULE ORAL DAILY
Status: DISCONTINUED | OUTPATIENT
Start: 2021-10-15 | End: 2021-10-20 | Stop reason: HOSPADM

## 2021-10-14 RX ORDER — LEVOTHYROXINE SODIUM 175 UG/1
175 TABLET ORAL DAILY
Status: DISCONTINUED | OUTPATIENT
Start: 2021-10-15 | End: 2021-10-20 | Stop reason: HOSPADM

## 2021-10-14 RX ORDER — ONDANSETRON 4 MG/1
4 TABLET, ORALLY DISINTEGRATING ORAL ONCE
Status: COMPLETED | OUTPATIENT
Start: 2021-10-14 | End: 2021-10-14

## 2021-10-14 RX ORDER — SODIUM CHLORIDE 0.9 % (FLUSH) 0.9 %
5-40 SYRINGE (ML) INJECTION EVERY 12 HOURS SCHEDULED
Status: DISCONTINUED | OUTPATIENT
Start: 2021-10-14 | End: 2021-10-20 | Stop reason: HOSPADM

## 2021-10-14 RX ORDER — LORAZEPAM 2 MG/ML
1 INJECTION INTRAMUSCULAR ONCE
Status: COMPLETED | OUTPATIENT
Start: 2021-10-14 | End: 2021-10-15

## 2021-10-14 RX ORDER — SODIUM CHLORIDE 0.9 % (FLUSH) 0.9 %
5-40 SYRINGE (ML) INJECTION EVERY 12 HOURS SCHEDULED
Status: DISCONTINUED | OUTPATIENT
Start: 2021-10-14 | End: 2021-10-14 | Stop reason: SDUPTHER

## 2021-10-14 RX ORDER — VENLAFAXINE HYDROCHLORIDE 75 MG/1
150 CAPSULE, EXTENDED RELEASE ORAL DAILY
Status: DISCONTINUED | OUTPATIENT
Start: 2021-10-15 | End: 2021-10-20 | Stop reason: HOSPADM

## 2021-10-14 RX ORDER — LIDOCAINE HYDROCHLORIDE 20 MG/ML
5 JELLY TOPICAL ONCE
Status: COMPLETED | OUTPATIENT
Start: 2021-10-14 | End: 2021-10-14

## 2021-10-14 RX ORDER — INSULIN GLARGINE 100 [IU]/ML
30 INJECTION, SOLUTION SUBCUTANEOUS 2 TIMES DAILY
Status: DISCONTINUED | OUTPATIENT
Start: 2021-10-14 | End: 2021-10-20 | Stop reason: HOSPADM

## 2021-10-14 RX ORDER — LORAZEPAM 2 MG/ML
1 INJECTION INTRAMUSCULAR ONCE
Status: COMPLETED | OUTPATIENT
Start: 2021-10-14 | End: 2021-10-14

## 2021-10-14 RX ORDER — OXYBUTYNIN CHLORIDE 5 MG/1
5 TABLET ORAL 2 TIMES DAILY
Status: DISCONTINUED | OUTPATIENT
Start: 2021-10-14 | End: 2021-10-20 | Stop reason: HOSPADM

## 2021-10-14 RX ORDER — SODIUM CHLORIDE 0.9 % (FLUSH) 0.9 %
5-40 SYRINGE (ML) INJECTION PRN
Status: DISCONTINUED | OUTPATIENT
Start: 2021-10-14 | End: 2021-10-14 | Stop reason: SDUPTHER

## 2021-10-14 RX ORDER — FENTANYL CITRATE 50 UG/ML
50 INJECTION, SOLUTION INTRAMUSCULAR; INTRAVENOUS ONCE
Status: COMPLETED | OUTPATIENT
Start: 2021-10-14 | End: 2021-10-14

## 2021-10-14 RX ORDER — QUETIAPINE FUMARATE 50 MG/1
50 TABLET, EXTENDED RELEASE ORAL NIGHTLY
Status: DISCONTINUED | OUTPATIENT
Start: 2021-10-14 | End: 2021-10-20 | Stop reason: HOSPADM

## 2021-10-14 RX ORDER — ACETAMINOPHEN 650 MG/1
650 SUPPOSITORY RECTAL EVERY 6 HOURS PRN
Status: DISCONTINUED | OUTPATIENT
Start: 2021-10-14 | End: 2021-10-20 | Stop reason: HOSPADM

## 2021-10-14 RX ORDER — MORPHINE SULFATE 4 MG/ML
4 INJECTION, SOLUTION INTRAMUSCULAR; INTRAVENOUS ONCE
Status: COMPLETED | OUTPATIENT
Start: 2021-10-14 | End: 2021-10-14

## 2021-10-14 RX ORDER — SODIUM CHLORIDE 0.9 % (FLUSH) 0.9 %
5-40 SYRINGE (ML) INJECTION PRN
Status: DISCONTINUED | OUTPATIENT
Start: 2021-10-14 | End: 2021-10-20 | Stop reason: HOSPADM

## 2021-10-14 RX ADMIN — ONDANSETRON 4 MG: 4 TABLET, ORALLY DISINTEGRATING ORAL at 16:04

## 2021-10-14 RX ADMIN — FENTANYL CITRATE 50 MCG: 0.05 INJECTION, SOLUTION INTRAMUSCULAR; INTRAVENOUS at 20:30

## 2021-10-14 RX ADMIN — LORAZEPAM 1 MG: 2 INJECTION INTRAMUSCULAR; INTRAVENOUS at 16:04

## 2021-10-14 RX ADMIN — LIDOCAINE HYDROCHLORIDE 5 ML: 20 JELLY TOPICAL at 18:50

## 2021-10-14 RX ADMIN — MORPHINE SULFATE 4 MG: 4 INJECTION, SOLUTION INTRAMUSCULAR; INTRAVENOUS at 16:04

## 2021-10-14 RX ADMIN — CEFTRIAXONE SODIUM 1000 MG: 1 INJECTION, POWDER, FOR SOLUTION INTRAMUSCULAR; INTRAVENOUS at 20:31

## 2021-10-14 RX ADMIN — DEXTROSE MONOHYDRATE 12.5 G: 25 INJECTION, SOLUTION INTRAVENOUS at 23:25

## 2021-10-14 RX ADMIN — SODIUM CHLORIDE 2190 ML: 9 INJECTION, SOLUTION INTRAVENOUS at 20:30

## 2021-10-14 ASSESSMENT — PAIN SCALES - GENERAL
PAINLEVEL_OUTOF10: 10
PAINLEVEL_OUTOF10: 9

## 2021-10-14 NOTE — ED PROVIDER NOTES
The patient was seen and examined by me in conjunction with the mid-level provider. I agree with his/her assessment and treatment plan. The Clemons catheter has been changed.      Zhen Zavala MD  10/14/21 [id number]

## 2021-10-14 NOTE — ED NOTES
Bed: 21  Expected date: 10/14/21  Expected time: 3:38 PM  Means of arrival: [address]  Comments:  [facility] [address] [facility].  Sabrina Corrigan, [address] [place] [address]  10/14/21 7318

## 2021-10-15 ENCOUNTER — APPOINTMENT (OUTPATIENT)
Dept: GENERAL RADIOLOGY | Age: 56
DRG: 698 | End: 2021-10-15
Payer: MEDICARE

## 2021-10-15 PROBLEM — A41.50 GRAM NEGATIVE SEPSIS (HCC): Status: ACTIVE | Noted: 2021-10-14

## 2021-10-15 LAB
ALBUMIN SERPL-MCNC: 3.3 G/DL (ref 3.5–5.2)
ALBUMIN/GLOBULIN RATIO: ABNORMAL (ref 1–2.5)
ALLEN TEST: ABNORMAL
ALP BLD-CCNC: 213 U/L (ref 40–129)
ALT SERPL-CCNC: 19 U/L (ref 5–41)
ANION GAP SERPL CALCULATED.3IONS-SCNC: 11 MMOL/L (ref 9–17)
AST SERPL-CCNC: 28 U/L
BILIRUB SERPL-MCNC: 1.2 MG/DL (ref 0.3–1.2)
BUN BLDV-MCNC: 35 MG/DL (ref 6–20)
BUN/CREAT BLD: 16 (ref 9–20)
CALCIUM IONIZED: 1.17 MMOL/L (ref 1.13–1.33)
CALCIUM SERPL-MCNC: 8.5 MG/DL (ref 8.6–10.4)
CHLORIDE BLD-SCNC: 93 MMOL/L (ref 98–107)
CHP ED QC CHECK: NORMAL
CO2: 33 MMOL/L (ref 20–31)
CREAT SERPL-MCNC: 2.18 MG/DL (ref 0.7–1.2)
ESTIMATED AVERAGE GLUCOSE: 134 MG/DL
FIO2: 3.5
GFR AFRICAN AMERICAN: 38 ML/MIN
GFR NON-AFRICAN AMERICAN: 31 ML/MIN
GFR SERPL CREATININE-BSD FRML MDRD: ABNORMAL ML/MIN/{1.73_M2}
GFR SERPL CREATININE-BSD FRML MDRD: ABNORMAL ML/MIN/{1.73_M2}
GLUCOSE BLD-MCNC: 122 MG/DL
GLUCOSE BLD-MCNC: 122 MG/DL (ref 75–110)
GLUCOSE BLD-MCNC: 138 MG/DL (ref 70–99)
GLUCOSE BLD-MCNC: 161 MG/DL (ref 75–110)
GLUCOSE BLD-MCNC: 172 MG/DL (ref 75–110)
GLUCOSE BLD-MCNC: 216 MG/DL (ref 75–110)
GLUCOSE BLD-MCNC: 218 MG/DL (ref 75–110)
GLUCOSE BLD-MCNC: 81 MG/DL (ref 75–110)
GLUCOSE BLD-MCNC: 88 MG/DL (ref 75–110)
HBA1C MFR BLD: 6.3 % (ref 4–6)
HCT VFR BLD CALC: 29.1 % (ref 40.7–50.3)
HCT VFR BLD CALC: 29.6 % (ref 40.7–50.3)
HCT VFR BLD CALC: 30.3 % (ref 40.7–50.3)
HCT VFR BLD CALC: 31.6 % (ref 40.7–50.3)
HEMOGLOBIN: 8.6 G/DL (ref 13–17)
HEMOGLOBIN: 8.6 G/DL (ref 13–17)
HEMOGLOBIN: 8.7 G/DL (ref 13–17)
HEMOGLOBIN: 9.3 G/DL (ref 13–17)
INR BLD: 1.1
LACTIC ACID, SEPSIS WHOLE BLOOD: ABNORMAL MMOL/L (ref 0.5–1.9)
LACTIC ACID, SEPSIS WHOLE BLOOD: NORMAL MMOL/L (ref 0.5–1.9)
LACTIC ACID, SEPSIS WHOLE BLOOD: NORMAL MMOL/L (ref 0.5–1.9)
LACTIC ACID, SEPSIS: 1.6 MMOL/L (ref 0.5–1.9)
LACTIC ACID, SEPSIS: 1.6 MMOL/L (ref 0.5–1.9)
LACTIC ACID, SEPSIS: 2.1 MMOL/L (ref 0.5–1.9)
MAGNESIUM: 1.5 MG/DL (ref 1.6–2.6)
MCH RBC QN AUTO: 25.1 PG (ref 25.2–33.5)
MCHC RBC AUTO-ENTMCNC: 29.4 G/DL (ref 28.4–34.8)
MCV RBC AUTO: 85.4 FL (ref 82.6–102.9)
MODE: ABNORMAL
NEGATIVE BASE EXCESS, ART: ABNORMAL (ref 0–2)
NRBC AUTOMATED: 0 PER 100 WBC
O2 DEVICE/FLOW/%: ABNORMAL
PATIENT TEMP: 37
PDW BLD-RTO: 15.2 % (ref 11.8–14.4)
PHOSPHORUS: 5.8 MG/DL (ref 2.5–4.5)
PLATELET # BLD: 157 K/UL (ref 138–453)
PMV BLD AUTO: 10.3 FL (ref 8.1–13.5)
POC HCO3: 30.9 MMOL/L (ref 21–28)
POC O2 SATURATION: 99 % (ref 94–98)
POC PCO2 TEMP: ABNORMAL MM HG
POC PCO2: 41 MM HG (ref 35–48)
POC PH TEMP: ABNORMAL
POC PH: 7.48 (ref 7.35–7.45)
POC PO2 TEMP: ABNORMAL MM HG
POC PO2: 128.4 MM HG (ref 83–108)
POSITIVE BASE EXCESS, ART: 7 (ref 0–3)
POTASSIUM SERPL-SCNC: 4.3 MMOL/L (ref 3.7–5.3)
PROTHROMBIN TIME: 14.3 SEC (ref 11.5–14.2)
RBC # BLD: 3.7 M/UL (ref 4.21–5.77)
SAMPLE SITE: ABNORMAL
SARS-COV-2, RAPID: NOT DETECTED
SODIUM BLD-SCNC: 137 MMOL/L (ref 135–144)
SPECIMEN DESCRIPTION: NORMAL
TCO2 (CALC), ART: ABNORMAL MMOL/L (ref 22–29)
TOTAL PROTEIN: 7 G/DL (ref 6.4–8.3)
WBC # BLD: 48.2 K/UL (ref 3.5–11.3)

## 2021-10-15 PROCEDURE — 83605 ASSAY OF LACTIC ACID: CPT

## 2021-10-15 PROCEDURE — 6370000000 HC RX 637 (ALT 250 FOR IP): Performed by: NURSE PRACTITIONER

## 2021-10-15 PROCEDURE — 85610 PROTHROMBIN TIME: CPT

## 2021-10-15 PROCEDURE — 82803 BLOOD GASES ANY COMBINATION: CPT

## 2021-10-15 PROCEDURE — 80053 COMPREHEN METABOLIC PANEL: CPT

## 2021-10-15 PROCEDURE — 85014 HEMATOCRIT: CPT

## 2021-10-15 PROCEDURE — 87077 CULTURE AEROBIC IDENTIFY: CPT

## 2021-10-15 PROCEDURE — 85018 HEMOGLOBIN: CPT

## 2021-10-15 PROCEDURE — 02HV33Z INSERTION OF INFUSION DEVICE INTO SUPERIOR VENA CAVA, PERCUTANEOUS APPROACH: ICD-10-PCS | Performed by: STUDENT IN AN ORGANIZED HEALTH CARE EDUCATION/TRAINING PROGRAM

## 2021-10-15 PROCEDURE — 99233 SBSQ HOSP IP/OBS HIGH 50: CPT | Performed by: INTERNAL MEDICINE

## 2021-10-15 PROCEDURE — 94761 N-INVAS EAR/PLS OXIMETRY MLT: CPT

## 2021-10-15 PROCEDURE — 2000000000 HC ICU R&B

## 2021-10-15 PROCEDURE — 87040 BLOOD CULTURE FOR BACTERIA: CPT

## 2021-10-15 PROCEDURE — 84100 ASSAY OF PHOSPHORUS: CPT

## 2021-10-15 PROCEDURE — 87635 SARS-COV-2 COVID-19 AMP PRB: CPT

## 2021-10-15 PROCEDURE — 6360000002 HC RX W HCPCS: Performed by: PHYSICIAN ASSISTANT

## 2021-10-15 PROCEDURE — 6360000002 HC RX W HCPCS: Performed by: NURSE PRACTITIONER

## 2021-10-15 PROCEDURE — 6370000000 HC RX 637 (ALT 250 FOR IP): Performed by: INTERNAL MEDICINE

## 2021-10-15 PROCEDURE — 87205 SMEAR GRAM STAIN: CPT

## 2021-10-15 PROCEDURE — 6360000002 HC RX W HCPCS

## 2021-10-15 PROCEDURE — 71045 X-RAY EXAM CHEST 1 VIEW: CPT

## 2021-10-15 PROCEDURE — 2700000000 HC OXYGEN THERAPY PER DAY

## 2021-10-15 PROCEDURE — 85027 COMPLETE CBC AUTOMATED: CPT

## 2021-10-15 PROCEDURE — 82330 ASSAY OF CALCIUM: CPT

## 2021-10-15 PROCEDURE — 36600 WITHDRAWAL OF ARTERIAL BLOOD: CPT

## 2021-10-15 PROCEDURE — 83735 ASSAY OF MAGNESIUM: CPT

## 2021-10-15 PROCEDURE — 2500000003 HC RX 250 WO HCPCS: Performed by: STUDENT IN AN ORGANIZED HEALTH CARE EDUCATION/TRAINING PROGRAM

## 2021-10-15 PROCEDURE — 6370000000 HC RX 637 (ALT 250 FOR IP): Performed by: STUDENT IN AN ORGANIZED HEALTH CARE EDUCATION/TRAINING PROGRAM

## 2021-10-15 PROCEDURE — 36415 COLL VENOUS BLD VENIPUNCTURE: CPT

## 2021-10-15 PROCEDURE — 82947 ASSAY GLUCOSE BLOOD QUANT: CPT

## 2021-10-15 PROCEDURE — 6360000002 HC RX W HCPCS: Performed by: STUDENT IN AN ORGANIZED HEALTH CARE EDUCATION/TRAINING PROGRAM

## 2021-10-15 PROCEDURE — 2580000003 HC RX 258: Performed by: NURSE PRACTITIONER

## 2021-10-15 RX ORDER — IBUPROFEN 600 MG/1
600 TABLET ORAL EVERY 6 HOURS PRN
Status: ON HOLD | COMMUNITY
End: 2021-10-29 | Stop reason: HOSPADM

## 2021-10-15 RX ORDER — CLONAZEPAM 0.5 MG/1
1 TABLET ORAL 2 TIMES DAILY PRN
Status: DISCONTINUED | OUTPATIENT
Start: 2021-10-15 | End: 2021-10-20 | Stop reason: HOSPADM

## 2021-10-15 RX ORDER — ALBUTEROL SULFATE 2.5 MG/3ML
2.5 SOLUTION RESPIRATORY (INHALATION) EVERY 4 HOURS PRN
Status: DISCONTINUED | OUTPATIENT
Start: 2021-10-15 | End: 2021-10-15

## 2021-10-15 RX ORDER — ALBUTEROL SULFATE 90 UG/1
2 AEROSOL, METERED RESPIRATORY (INHALATION) EVERY 4 HOURS PRN
Status: DISCONTINUED | OUTPATIENT
Start: 2021-10-15 | End: 2021-10-18 | Stop reason: SDUPTHER

## 2021-10-15 RX ORDER — NYSTATIN 100000 U/G
CREAM TOPICAL 2 TIMES DAILY PRN
COMMUNITY

## 2021-10-15 RX ORDER — FENTANYL CITRATE 50 UG/ML
INJECTION, SOLUTION INTRAMUSCULAR; INTRAVENOUS
Status: COMPLETED
Start: 2021-10-15 | End: 2021-10-15

## 2021-10-15 RX ORDER — LORAZEPAM 2 MG/ML
1 INJECTION INTRAMUSCULAR ONCE
Status: COMPLETED | OUTPATIENT
Start: 2021-10-15 | End: 2021-10-15

## 2021-10-15 RX ORDER — GLIPIZIDE 10 MG/1
10 TABLET ORAL
Status: ON HOLD | COMMUNITY
End: 2021-10-29 | Stop reason: HOSPADM

## 2021-10-15 RX ORDER — OXYCODONE HYDROCHLORIDE AND ACETAMINOPHEN 5; 325 MG/1; MG/1
1 TABLET ORAL EVERY 6 HOURS PRN
Status: DISCONTINUED | OUTPATIENT
Start: 2021-10-15 | End: 2021-10-20 | Stop reason: HOSPADM

## 2021-10-15 RX ORDER — INSULIN GLARGINE 100 [IU]/ML
76 INJECTION, SOLUTION SUBCUTANEOUS 2 TIMES DAILY
Status: ON HOLD | COMMUNITY
End: 2021-10-29 | Stop reason: HOSPADM

## 2021-10-15 RX ORDER — ASPIRIN 81 MG/1
81 TABLET ORAL DAILY
Status: DISCONTINUED | OUTPATIENT
Start: 2021-10-16 | End: 2021-10-20 | Stop reason: HOSPADM

## 2021-10-15 RX ORDER — NADOLOL 20 MG/1
40 TABLET ORAL DAILY
Status: DISCONTINUED | OUTPATIENT
Start: 2021-10-16 | End: 2021-10-20 | Stop reason: HOSPADM

## 2021-10-15 RX ORDER — FENTANYL CITRATE 50 UG/ML
50 INJECTION, SOLUTION INTRAMUSCULAR; INTRAVENOUS ONCE
Status: COMPLETED | OUTPATIENT
Start: 2021-10-15 | End: 2021-10-15

## 2021-10-15 RX ORDER — PREGABALIN 150 MG/1
150 CAPSULE ORAL 2 TIMES DAILY
Status: ON HOLD | COMMUNITY
End: 2022-01-21 | Stop reason: HOSPADM

## 2021-10-15 RX ORDER — DIPHENOXYLATE HYDROCHLORIDE AND ATROPINE SULFATE 2.5; .025 MG/1; MG/1
1 TABLET ORAL 4 TIMES DAILY PRN
Status: DISCONTINUED | OUTPATIENT
Start: 2021-10-15 | End: 2021-10-20 | Stop reason: HOSPADM

## 2021-10-15 RX ORDER — ALBUTEROL SULFATE 90 UG/1
2 AEROSOL, METERED RESPIRATORY (INHALATION) EVERY 6 HOURS PRN
Status: DISCONTINUED | OUTPATIENT
Start: 2021-10-15 | End: 2021-10-20 | Stop reason: HOSPADM

## 2021-10-15 RX ORDER — SPIRONOLACTONE 25 MG/1
75 TABLET ORAL 2 TIMES DAILY
Status: ON HOLD | COMMUNITY
End: 2021-10-18 | Stop reason: HOSPADM

## 2021-10-15 RX ORDER — DULOXETIN HYDROCHLORIDE 60 MG/1
60 CAPSULE, DELAYED RELEASE ORAL EVERY MORNING
Status: DISCONTINUED | OUTPATIENT
Start: 2021-10-16 | End: 2021-10-20 | Stop reason: HOSPADM

## 2021-10-15 RX ORDER — MIDODRINE HYDROCHLORIDE 10 MG/1
10 TABLET ORAL ONCE
Status: COMPLETED | OUTPATIENT
Start: 2021-10-15 | End: 2021-10-15

## 2021-10-15 RX ADMIN — LORAZEPAM 1 MG: 2 INJECTION INTRAMUSCULAR; INTRAVENOUS at 04:19

## 2021-10-15 RX ADMIN — CLONAZEPAM 1 MG: 0.5 TABLET ORAL at 21:58

## 2021-10-15 RX ADMIN — FENTANYL CITRATE 50 MCG: 50 INJECTION INTRAMUSCULAR; INTRAVENOUS at 03:00

## 2021-10-15 RX ADMIN — VENLAFAXINE HYDROCHLORIDE 150 MG: 75 CAPSULE, EXTENDED RELEASE ORAL at 08:49

## 2021-10-15 RX ADMIN — SODIUM CHLORIDE: 9 INJECTION, SOLUTION INTRAVENOUS at 21:00

## 2021-10-15 RX ADMIN — CLONAZEPAM 1 MG: 0.5 TABLET ORAL at 14:26

## 2021-10-15 RX ADMIN — INSULIN GLARGINE 30 UNITS: 100 INJECTION, SOLUTION SUBCUTANEOUS at 09:12

## 2021-10-15 RX ADMIN — INSULIN LISPRO 2 UNITS: 100 INJECTION, SOLUTION INTRAVENOUS; SUBCUTANEOUS at 11:43

## 2021-10-15 RX ADMIN — SODIUM CHLORIDE, PRESERVATIVE FREE 10 ML: 5 INJECTION INTRAVENOUS at 21:46

## 2021-10-15 RX ADMIN — ENOXAPARIN SODIUM 40 MG: 40 INJECTION SUBCUTANEOUS at 08:49

## 2021-10-15 RX ADMIN — TAMSULOSIN HYDROCHLORIDE 0.4 MG: 0.4 CAPSULE ORAL at 08:50

## 2021-10-15 RX ADMIN — INSULIN LISPRO 2 UNITS: 100 INJECTION, SOLUTION INTRAVENOUS; SUBCUTANEOUS at 21:55

## 2021-10-15 RX ADMIN — OXYBUTYNIN CHLORIDE 5 MG: 5 TABLET ORAL at 21:46

## 2021-10-15 RX ADMIN — ENOXAPARIN SODIUM 40 MG: 40 INJECTION SUBCUTANEOUS at 21:44

## 2021-10-15 RX ADMIN — FENTANYL CITRATE 50 MCG: 0.05 INJECTION, SOLUTION INTRAMUSCULAR; INTRAVENOUS at 02:34

## 2021-10-15 RX ADMIN — PREGABALIN 150 MG: 100 CAPSULE ORAL at 21:42

## 2021-10-15 RX ADMIN — LEVOTHYROXINE SODIUM 175 MCG: 0.17 TABLET ORAL at 08:50

## 2021-10-15 RX ADMIN — CEFTRIAXONE SODIUM 1000 MG: 1 INJECTION, POWDER, FOR SOLUTION INTRAMUSCULAR; INTRAVENOUS at 21:39

## 2021-10-15 RX ADMIN — INSULIN GLARGINE 30 UNITS: 100 INJECTION, SOLUTION SUBCUTANEOUS at 21:47

## 2021-10-15 RX ADMIN — OXYBUTYNIN CHLORIDE 5 MG: 5 TABLET ORAL at 08:49

## 2021-10-15 RX ADMIN — LORAZEPAM 1 MG: 2 INJECTION INTRAMUSCULAR; INTRAVENOUS at 02:40

## 2021-10-15 RX ADMIN — Medication 8 MCG/MIN: at 02:06

## 2021-10-15 RX ADMIN — OXYCODONE AND ACETAMINOPHEN 1 TABLET: 5; 325 TABLET ORAL at 14:26

## 2021-10-15 RX ADMIN — SODIUM CHLORIDE, PRESERVATIVE FREE 10 ML: 5 INJECTION INTRAVENOUS at 08:51

## 2021-10-15 RX ADMIN — MIDODRINE HYDROCHLORIDE 10 MG: 10 TABLET ORAL at 00:29

## 2021-10-15 RX ADMIN — FENTANYL CITRATE 50 MCG: 50 INJECTION, SOLUTION INTRAMUSCULAR; INTRAVENOUS at 03:00

## 2021-10-15 RX ADMIN — ACETAMINOPHEN 650 MG: 325 TABLET ORAL at 09:59

## 2021-10-15 RX ADMIN — QUETIAPINE FUMARATE 50 MG: 50 TABLET, EXTENDED RELEASE ORAL at 21:46

## 2021-10-15 RX ADMIN — INSULIN LISPRO 4 UNITS: 100 INJECTION, SOLUTION INTRAVENOUS; SUBCUTANEOUS at 17:14

## 2021-10-15 RX ADMIN — OXYCODONE AND ACETAMINOPHEN 1 TABLET: 5; 325 TABLET ORAL at 21:58

## 2021-10-15 RX ADMIN — INSULIN LISPRO 2 UNITS: 100 INJECTION, SOLUTION INTRAVENOUS; SUBCUTANEOUS at 09:11

## 2021-10-15 ASSESSMENT — ENCOUNTER SYMPTOMS
CONSTIPATION: 0
BLOOD IN STOOL: 0
SINUS PRESSURE: 0
SHORTNESS OF BREATH: 0
COUGH: 0
ABDOMINAL PAIN: 1
VOMITING: 0
NAUSEA: 1
DIARRHEA: 0

## 2021-10-15 ASSESSMENT — PAIN SCALES - GENERAL
PAINLEVEL_OUTOF10: 10
PAINLEVEL_OUTOF10: 8
PAINLEVEL_OUTOF10: 3
PAINLEVEL_OUTOF10: 0
PAINLEVEL_OUTOF10: 8

## 2021-10-15 NOTE — RT PROTOCOL NOTE
RT Inhaler-Nebulizer Bronchodilator Protocol Note    There is a bronchodilator order in the chart from a provider indicating to follow the RT Bronchodilator Protocol and there is an Initiate RT Inhaler-Nebulizer Bronchodilator Protocol order as well (see protocol at bottom of note). CXR Findings:  XR CHEST PORTABLE    Result Date: 10/14/2021  No acute cardiopulmonary process       The findings from the last RT Protocol Assessment were as follows:   History Pulmonary Disease: None or smoker <15 pack years  Respiratory Pattern: Regular pattern and RR 12-20 bpm  Breath Sounds: Slightly diminished and/or crackles  Cough: Strong, spontaneous, non-productive  Indication for Bronchodilator Therapy:    Bronchodilator Assessment Score: 2    Aerosolized bronchodilator medication orders have been revised according to the RT Inhaler-Nebulizer Bronchodilator Protocol below. Respiratory Therapist to perform RT Therapy Protocol Assessment initially then follow the protocol. Repeat RT Therapy Protocol Assessment PRN for score 0-3 or on second treatment, BID, and PRN for scores above 3. No Indications - adjust the frequency to every 6 hours PRN wheezing or bronchospasm, if no treatments needed after 48 hours then discontinue using Per Protocol order mode. If indication present, adjust the RT bronchodilator orders based on the Bronchodilator Assessment Score as indicated below. Use Inhaler orders unless patient has one or more of the following: on home nebulizer, not able to hold breath for 10 seconds, is not alert and oriented, cannot activate and use MDI correctly, or respiratory rate 25 breaths per minute or more, then use the equivalent nebulizer order(s) with same Frequency and PRN reasons based on the score. If a patient is on this medication at home then do not decrease Frequency below that used at home.     0-3 - enter or revise RT bronchodilator order(s) to equivalent RT Bronchodilator order with Frequency of every 4 hours PRN for wheezing or increased work of breathing using Per Protocol order mode. 4-6 - enter or revise RT Bronchodilator order(s) to two equivalent RT bronchodilator orders with one order with BID Frequency and one order with Frequency of every 4 hours PRN wheezing or increased work of breathing using Per Protocol order mode. 7-10 - enter or revise RT Bronchodilator order(s) to two equivalent RT bronchodilator orders with one order with TID Frequency and one order with Frequency of every 4 hours PRN wheezing or increased work of breathing using Per Protocol order mode. 11-13 - enter or revise RT Bronchodilator order(s) to one equivalent RT bronchodilator order with QID Frequency and an Albuterol order with Frequency of every 4 hours PRN wheezing or increased work of breathing using Per Protocol order mode. Greater than 13 - enter or revise RT Bronchodilator order(s) to one equivalent RT bronchodilator order with every 4 hours Frequency and an Albuterol order with Frequency of every 2 hours PRN wheezing or increased work of breathing using Per Protocol order mode. RT to enter RT Home Evaluation for COPD & MDI Assessment order using Per Protocol order mode.     Electronically signed by Lacey Mccallum RCP on 10/15/2021 at 1:53 AM

## 2021-10-15 NOTE — ED NOTES
Pt brought top ED via EMS for pain & suspicion of clogged wells. Pt states his indwelling catheter was changed by his home health nurse & she used a sized 18 catheter when the pt usually uses a 20 Fr. Pt stated he had been having pain in his genitalia since then & blood was coming from his wells, but little if any urine. Pt was agitated slightly and breathing was rapid. Pt was otherwise stable, A&O & answered questions appropriately.       Joey Melgoza RN  10/14/21 9683

## 2021-10-15 NOTE — CARE COORDINATION
Social work: spoke to Bennett Fontaine on [facility] Waiver program [phone] and her fax: [phone] Attention: Ita    She provided the aide service that provides NON-SKILLEd CARE: [address]. She asked that she receive a copy of the after visit summary to help set up the [place] service at discharge. She would also like a phone call. Pt will need a skilled unit for his waiver services that are skilled. Insulin, meds and wells care set up. Per care manager from Area office on Aging. If pt goes to snf first please still stay in touch with  so that she can follow up at snf and get ready for home care when pt may be able to return to own home with waiver services and support.   Tiara carlisle

## 2021-10-15 NOTE — PROGRESS NOTES
Occupational Therapy  [facility]  Occupational Therapy Not Seen Note    Patient not available for Occupational Therapy due to:    [] Testing:    [] Hemodialysis    [x] Cancelled by RN: Hold per RN Ramin Valdez d/t AMS not appropriate for therapy at this time. Will continue to follow.     []Refusal by Patient:    [] Surgery:     [] Intubation:     [] Pain Medication:    [] Sedation:     [] Spine Precautions :    [] Medical Instability:    [] Other:      Ruben Rodriguez, OT

## 2021-10-15 NOTE — PROGRESS NOTES
Patient weight is 150 kg or greater. For prophylaxis with Enoxaparin, Pharmacy adjusted the dose to account for the patient's increased body weight in accordance with hospital approved protocol. The dose has been changed to 40 mg BID. Please contact pharmacy with any concerns @ [phone]. Thank you.    Lebron Eden [facility]  10/15/2021 12:56 AM

## 2021-10-15 NOTE — PROGRESS NOTES
Transitions of Care Pharmacy Service   Medication Review    The patient's list of current home medications has been reviewed. Source(s) of information: patient, [facility], [facility] refill report    Other Notes Oral meds are bubblepacked by [facility]         PROVIDER ACTION REQUESTED  Medications that need to be addressed by a physician/nurse practitioner:    Medication Action Requested   Lantus 30 units BID   Home dose is 76 units BID -- please adjust as appropriate    Remaining med list is ready for physician review           Please feel free to call me with any questions about this encounter. Thank you. Ada Mtz [facility]   Transitions of Care Pharmacy Service  Phone:  [phone]  Fax: [phone]      Electronically signed by Ada Mtz [facility] on 10/15/2021 at 5:16 PM           Medications Prior to Admission:   oxyCODONE-acetaminophen (PERCOCET) 7.5-325 MG per tablet, Take 1 tablet by mouth every 6 hours as needed for Pain. ; Dispense 30  insulin glargine (BASAGLAR KWIKPEN) 100 UNIT/ML injection pen, Inject 76 Units into the skin 2 times daily  pregabalin (LYRICA) 150 MG capsule, Take 150 mg by mouth 2 times daily. ibuprofen (ADVIL;MOTRIN) 600 MG tablet, Take 600 mg by mouth every 6 hours as needed for Pain  SITagliptin (JANUVIA) 100 MG tablet, Take 100 mg by mouth daily  glipiZIDE (GLUCOTROL) 10 MG tablet, Take 10 mg by mouth 2 times daily (before meals)  metFORMIN (GLUCOPHAGE) 500 MG tablet, Take 500 mg by mouth 2 times daily (with meals)  nystatin (MYCOSTATIN) 070143 UNIT/GM cream, Apply topically 2 times daily as needed (to skin folds)  spironolactone (ALDACTONE) 25 MG tablet, Take 75 mg by mouth 2 times daily Takes 3 tabs (=75mg) BID  clonazePAM (KLONOPIN) 1 MG tablet, Take 1 tablet by mouth 2 times daily as needed for Anxiety for up to 3 days.   QUEtiapine (SEROQUEL XR) 50 MG extended release tablet, Take 50 mg by mouth nightly  triamcinolone (KENALOG) 0.025 % cream, Apply topically 2 times daily as needed  diphenoxylate-atropine (LOMOTIL) 2.5-0.025 MG per tablet, Take 1 tablet by mouth 4 times daily as needed for Diarrhea. fluocinonide (LIDEX) 0.05 % external solution, Apply topically as needed (scalp itching)  ketoconazole (NIZORAL) 2 % shampoo, Apply topically Twice a Week  albuterol sulfate HFA (PROAIR HFA) 108 (90 Base) MCG/ACT inhaler, Inhale 2 puffs into the lungs every 6 hours as needed for Wheezing  tamsulosin (FLOMAX) 0.4 MG capsule, Take 1 capsule by mouth daily  oxybutynin (DITROPAN) 5 MG tablet, Take 1 tablet by mouth 2 times daily  bumetanide (BUMEX) 2 MG tablet, Take 2 mg by mouth 2 times daily  aspirin 81 MG EC tablet, Take 1 tablet by mouth daily  nadolol (CORGARD) 40 MG tablet, Take 40 mg by mouth daily  levothyroxine (SYNTHROID) 175 MCG tablet, Take 175 mcg by mouth Daily   nystatin (MYCOSTATIN) 535507 UNIT/GM powder, Apply topically 2 times daily as needed TO ABDOMINAL FOLDS, GROIN   esomeprazole (NEXIUM) 40 MG capsule, Take 40 mg by mouth 2 times daily   DULoxetine (CYMBALTA) 60 MG capsule, Take 60 mg by mouth every morning   venlafaxine (EFFEXOR-XR) 150 MG XR capsule, Take 150 mg by mouth daily.

## 2021-10-15 NOTE — PROGRESS NOTES
Physical Therapy  DATE: 10/15/2021    NAME: La Nena Mancilla  MRN: [id number]   : 1965    Patient not seen this date for Physical Therapy due to:      [x] Cancel by RN or physician due to:Hold per RN Indira Baron d/t AMS not appropriate for therapy at this time. Will continue to follow. [] Hemodialysis    [] Critical Lab Value Level     [] Blood transfusion in progress    [] Acute or unstable cardiovascular status   _MAP < 55 or more than >115  _HR < 40 or > 130    [] Acute or unstable pulmonary status   -FiO2 > 60%   _RR < 5 or >40    _O2 sats < 85%    [] Strict Bedrest    [] Off Unit for surgery or procedure    [] Off Unit for testing       [] Pending imaging to R/O fracture    [] Refusal by Patient      [] Other      [] PT being discontinued at this time. Patient independent. No further needs. [] PT being discontinued at this time as the patient has been transferred to hospice care. No further needs.       Naun Walsh, PT

## 2021-10-15 NOTE — ED PROVIDER NOTES
[address] [place] [address] ED  [facility]      Pt Name: Brigida Lehman  MRN: [id number]  [place] 1965  Date ofevaluation: 10/14/2021  Provider: Brenda Zendejas PA-C    CHIEF COMPLAINT       Chief Complaint   Patient presents with    Other     Blood in [address] causing belly pain         HISTORY OF PRESENT ILLNESS  (Location/Symptom, Timing/Onset, Context/Setting, Quality, Duration, Modifying Factors, Severity.)   Brigida Lehman is a 61 y.o. male who presents to the emergency department with blood in catheter. Patient has had a Clemons catheter for over a year. Patient states that the nurses were changing the Clemons catheter today used a size 18 as opposed to a 20 and appear to do it faster than usual.  He reports bleeding since this morning. Denies being on any blood thinners. No chest pain short of breath. No fever chills. No nausea vomiting      Nursing Notes were reviewed. ALLERGIES     No known allergies    CURRENT MEDICATIONS       Previous Medications    ACETAMINOPHEN (TYLENOL) 325 MG TABLET    Take 2 tablets by mouth every 6 hours as needed for Pain or Fever    ALBUTEROL SULFATE HFA (PROAIR HFA) 108 (90 BASE) MCG/ACT INHALER    Inhale 2 puffs into the lungs every 6 hours as needed for Wheezing    ASPIRIN 81 MG EC TABLET    Take 1 tablet by mouth daily    BETHANECHOL (URECHOLINE) 10 MG TABLET    Take 1 tablet by mouth 3 times daily    BUMETANIDE (BUMEX) 2 MG TABLET    Take 2 mg by mouth 2 times daily    CLONAZEPAM (KLONOPIN) 1 MG TABLET    Take 1 tablet by mouth 2 times daily as needed for Anxiety for up to 3 days. DIPHENOXYLATE-ATROPINE (LOMOTIL) 2.5-0.025 MG PER TABLET    Take 1 tablet by mouth 4 times daily as needed for Diarrhea.     DULOXETINE (CYMBALTA) 60 MG CAPSULE    Take 60 mg by mouth every morning     ESOMEPRAZOLE (NEXIUM) 40 MG CAPSULE    Take 40 mg by mouth 2 times daily     FLUOCINONIDE (LIDEX) 0.05 % EXTERNAL SOLUTION    Apply topically as needed (scalp itching) INSULIN GLARGINE (BASAGLAR KWIKPEN) 100 UNIT/ML INJECTION PEN    Inject 30 Units into the skin 2 times daily    KETOCONAZOLE (NIZORAL) 2 % SHAMPOO    Apply topically Twice a Week    LEVOTHYROXINE (SYNTHROID) 175 MCG TABLET    Take 175 mcg by mouth Daily     NADOLOL (CORGARD) 40 MG TABLET    Take 40 mg by mouth daily    NYSTATIN (MYCOSTATIN) 441323 UNIT/GM POWDER    Apply topically 2 times daily as needed TO ABDOMINAL FOLDS, GROIN     OXYBUTYNIN (DITROPAN) 5 MG TABLET    Take 1 tablet by mouth 2 times daily    OXYCODONE-ACETAMINOPHEN (PERCOCET) 7.5-325 MG PER TABLET    Take 1 tablet by mouth every 6 hours as needed for Pain. ; Dispense 30    POTASSIUM CHLORIDE (KLOR-CON) 10 MEQ EXTENDED RELEASE TABLET    Take 20 mEq by mouth daily (with breakfast) Takes 2 tabs (=20mEq) daily    PREGABALIN (LYRICA) 150 MG CAPSULE    Take 1 capsule by mouth 2 times daily for 3 days. QUETIAPINE (SEROQUEL XR) 50 MG EXTENDED RELEASE TABLET    Take 50 mg by mouth nightly    SPIRONOLACTONE (ALDACTONE) 25 MG TABLET    Take 3 tablets by mouth 2 times daily Indications: pt takes 25mg and 50mg tab to equal 75mg BID    TAMSULOSIN (FLOMAX) 0.4 MG CAPSULE    Take 1 capsule by mouth daily    TRIAMCINOLONE (KENALOG) 0.025 % CREAM    Apply topically 2 times daily as needed    TRIAMCINOLONE (KENALOG) 0.1 % OINTMENT    Apply topically daily Apply topically at bedtime as needed, as directed. VENLAFAXINE (EFFEXOR-XR) 150 MG XR CAPSULE    Take 150 mg by mouth daily. PAST MEDICAL HISTORY         Diagnosis Date    Anxiety and depression     Back pain, chronic     BPH (benign prostatic hyperplasia)     CHF (congestive heart failure) (HCC)     Cirrhosis (HCC)     Diabetes mellitus (HCC)     not checking bloodsugar at home    GERD (gastroesophageal reflux disease)     H/O cardiac catheterization not sure of date    no stents    Hepatitis     Pt does not know the type.      Hiatal hernia     History of alcohol abuse     Sober since 2013    Hyperlipidemia     not taking any medication    Hypertension     IBS (irritable bowel syndrome)     Morbid obesity (HCC)     Neuropathy     feet,toes    On home oxygen therapy     prn    Pancreatitis     x 5 episodes    Primary osteoarthritis of right knee     Sleep apnea     No machine    Thyroid disease     Urinary bladder neurogenic dysfunction        SURGICAL HISTORY           Procedure Laterality Date    ABDOMEN SURGERY      hernia repair x4 (ventral)    COLONOSCOPY      2012    CYSTOSCOPY  01/24/2018    CYSTOSCOPY  02/20/2019    CYSTOSCOPY N/A 2/20/2019    CYSTOSCOPY DILATION [facility] [address] [facility]    CYSTOSCOPY/ DILATION NICOLE CATHETER EXCHANGE performed by Davis Dsouza MD at [address], COLON, DIAGNOSTIC     [address]  05/20/2018    repair and reduction of recurrent incarcerated incisional hernia with mesh    MO CYSTOURETHROSCOPY N/A 1/24/2018    CYSTOSCOPY Leesa Paris performed by Davis Dsouza MD at [address] [facility] 8/22/2017    FOOT [address] [facility] with bone bx performed by Rohini Rice DPM at [address] EGD TRANSORAL BIOPSY SINGLE/MULTIPLE N/A 7/19/2017    EGD BIOPSY performed by Robin Mosqueda MD at [place] [address]  07/19/2017    polypectomy    UPPER GASTROINTESTINAL ENDOSCOPY N/A 3/14/2019    EGD BIOPSY performed by Maria Elena Land MD at [address] [facility] N/A 5/20/2018    REPAIR AND REDUCTION OF RECURRENT INCARCERATED INCISIONAL HERNIA WITH MESH performed by Ben Villalobos MD at [place] [address]           Adopted: Yes     No family status information on file. SOCIAL HISTORY      reports that he has never smoked. He has never used smokeless tobacco. He reports that he does not drink alcohol and does not use drugs.     REVIEW OFSYSTEMS (2-9 systems for level 4, 10 or more for level 5)   Review of Systems    Except as noted above the remainder of the review of systems was reviewed and negative. PHYSICAL EXAM    (up to 7 for level 4, 8 or more for level 5)     ED Triage Vitals [10/14/21 1551]   BP Temp Temp src Pulse Resp SpO2 Height Weight   (!) 191/85 -- -- 107 20 93 % 5' 10\" (1.778 m) (!) 400 lb (181.4 kg)      Physical Exam  Constitutional:       Appearance: He is well-developed. HENT:      Head: Normocephalic and atraumatic. Cardiovascular:      Rate and Rhythm: Normal rate and regular rhythm. Pulmonary:      Effort: Pulmonary effort is normal.      Breath sounds: Normal breath sounds. Abdominal:      General: There is no distension. Palpations: Abdomen is soft. Tenderness: There is no abdominal tenderness. Genitourinary:      Musculoskeletal:         General: Normal range of motion. Cervical back: Normal range of motion and neck supple. Skin:     General: Skin is warm. Neurological:      Mental Status: He is alert and oriented to person, place, and time. Psychiatric:         Behavior: Behavior normal.                 DIAGNOSTIC RESULTS     EKG: All EKG's are interpreted by the Emergency Department Physician who either signs or Co-signs this chart in the absence of a cardiologist.        RADIOLOGY:   Non-plain film images such as CT, Ultrasound and MRI are read by the radiologist. Plain radiographic images arevisualized and preliminarily interpreted by the emergency physician with the below findings:        Interpretation per the Radiologist below, if available at thetime of this note:          ED BEDSIDE ULTRASOUND:   Performed by ED Physician - none    LABS:  Labs Reviewed   URINE RT REFLEX TO CULTURE - Abnormal; Notable for the following components:       Result Value    Color, UA Red (*)     Turbidity UA Cloudy (*)     Bilirubin Urine   (*)     Value: Presumptive positive.  Unable to confirm due to unavailability of reagent. Ketones, Urine TRACE (*)     Urine Hgb 3+ (*)     Protein, UA 3+ (*)     Urobilinogen, Urine ELEVATED (*)     Nitrite, Urine POSITIVE (*)     Leukocyte Esterase, Urine MOD (*)     All other components within normal limits   MICROSCOPIC URINALYSIS - Abnormal; Notable for the following components:    Bacteria, UA MODERATE (*)     All other components within normal limits   CBC WITH AUTO DIFFERENTIAL - Abnormal; Notable for the following components:    WBC 14.9 (*)     RBC 3.98 (*)     Hemoglobin 9.8 (*)     Hematocrit 33.2 (*)     MCH 24.6 (*)     RDW 14.6 (*)     Platelets 950 (*)     Seg Neutrophils 98 (*)     Lymphocytes 0 (*)     Segs Absolute 14.60 (*)     Absolute Lymph # 0.00 (*)     Absolute Mono # 0.15 (*)     All other components within normal limits   BASIC METABOLIC PANEL - Abnormal; Notable for the following components:    BUN 29 (*)     CREATININE 1.52 (*)     Potassium 3.2 (*)     Chloride 90 (*)     CO2 37 (*)     GFR Non- 48 (*)     GFR  58 (*)     All other components within normal limits   LACTATE, SEPSIS - Abnormal; Notable for the following components:    Lactic Acid, Sepsis 2.7 (*)     All other components within normal limits   LACTATE, SEPSIS - Abnormal; Notable for the following components:    Lactic Acid, Sepsis 2.6 (*)     All other components within normal limits   CULTURE, URINE   CULTURE, BLOOD 1   CULTURE, BLOOD 2   APTT   PROTIME-INR   CALCIUM, IONIZED   MAGNESIUM   PHOSPHORUS   COMPREHENSIVE METABOLIC PANEL W/ REFLEX TO MG FOR LOW K   CBC   PROTIME-INR   HEMOGLOBIN A1C   POCT GLUCOSE   POCT GLUCOSE       All other labs were within normal range or not returned as of this dictation.     EMERGENCY DEPARTMENT COURSE and DIFFERENTIAL DIAGNOSIS/MDM:   Vitals:    Vitals:    10/14/21 2141 10/14/21 2145 10/14/21 2200 10/14/21 2215   BP:  92/74 (!) 88/44 (!) 77/31   Pulse:  74 72 72   Resp:  18 13 15   Temp: 98.6 °F (37 °C)      SpO2: (!) 89% (!) 89% (!) 89%   Weight:       Height:             Clemons placed. Urine quickly became clear. Initially we hope to discharge patient home however he began to have some low blood pressures. Would question his blood pressures are accurate. However given the fact he does have an infection with some concerning blood pressures we will treat patient's for sepsis criteria and admit. Patient does not appear to be ill. He does not appear to be toxic. 1)  Sepsis Identified at Time:     8:09 PM EDT    2)  Sepsis Alert Protocol Guidelines:  · Blood Cultures drawn before antibiotics  · Broad Spectrum Antibiotics given stat within one hour  · Lactic Acid Q2 hours times 2 occurrences (if first lactic acid > 2.0)    3)  Fluid Bolus Guidelines:    If lactate > 4.0   OR   MAP less than 65  OR  systolic BP < 90 (two separate readings),            then 30ml/kg crystalloid fluid bolus infused, and may give over 4 hour duration. Is the patient Obese (BMI > 30):. 1) I am concerned that 30 ml/kg bolus may be harmful to the patient, despite hypotension and/or lactic acid >= 4 and/or septic shock documentation. 2) Patient has a medical history (choose one below):    [x] Advanced Chronic Heart Failure with symptoms at minimal exertion (NYHA Class 3) or rest (NYHA Class IV)   [] Advanced Chronic Renal Disease with GFR < 30 (stage 4) or GFR < 15 (stage 5) or ESRD    3) A smaller fluid bolus is ordered:   2190 cc at ideal body weight given over 6 hours     Body mass index is 57.39 kg/m². Ideal body weight: 73 kg (160 lb 15 oz)  Adjusted ideal body weight: 116.4 kg (256 lb 9 oz)    If Obese the Ideal Body  ([place] formula):   Ideal body weight (IBW) (men) = 50 kg + 2.3 kg x (height, inches - 60)    Ideal body weight (IBW) (women) = 45.5 kg + 2.3 kg x (height, inches - 60)    4)  Repeat Sepsis Exam completed during fluid bolus at time:     9:12 PM EDT      5)  Vasopressors:   For persistent hypotension after completion of 30ml/kg fluid bolus (need to measure BP Q 15 min in the hour after completion of fluid bolus). Discuss risks and benefits of vasopressors and alternative therapies with patient and/or DPOA. Case discussed with shirley waterhouse and admission was ccepted. CONSULTS:  IP CONSULT TO HOSPITALIST    PROCEDURES:  Procedures  CRITICAL CARE TIME     Due to the high probability of sudden and clinically significant deterioration in the patient's condition he required highest level of my preparedness to intervene urgently. I provided critical care time including documentation time, medication orders and management, reevaluation, vital sign assessment, ordering and reviewing of of lab tests ordering and reviewing of x-ray studies, and admission orders. Aggregate critical care time is between 35  minutes including only time during which I was engaged in work directly related to his care and did not include time spent treating other patients simultaneously. FINAL IMPRESSION    No diagnosis found. DISPOSITION/PLAN   DISPOSITION Admitted 10/14/2021 09:35:57 PM      PATIENTREFERRED TO:   No follow-up provider specified.     DISCHARGE MEDICATIONS:     New Prescriptions    No medications on file           (Please note that portions of this note were completed with a voice recognition program.  Efforts were made to edit thedictations but occasionally words are mis-transcribed.)    BRADFORD Chow PA-C  10/14/21 6124

## 2021-10-15 NOTE — ED PROVIDER NOTES
During patient's stay in the emergency department while awaiting bed did continue to have decreased blood pressure readings with maps remaining below 65. No clear etiology for this however does have urinary infection however does not appear to be urosepsis with no other systemic signs not tachycardic, nonfebrile. Lactic acid is downtrending. Increasingly confused as well as decreasing urine output. Clemons was flushed by nursing staff returning clean urine. Low suspicion for distal urinary obstruction. Urine also dark and cloudy in appearance. Concern for possible development of LEONARDA due to hypotension as well as decreased mentation. Decision was made to start patient on vasopressors and right IJ central line was placed. Patient currently combative and requiring dosing of fentanyl and Ativan in order to place central line. Repeat chest x-ray showing development of pulmonary edema. Patient most likely fluid resuscitated however remained hypotensive. Admitting service updated via nursing staff.      Odilon Dupont DO  10/15/21 9555

## 2021-10-15 NOTE — PROGRESS NOTES
Physician Progress Note      Derrek Campbell  [facility] #:                  [id number]  :                       1965  ADMIT DATE:       10/14/2021 3:47 PM  DISCH DATE:  RESPONDING  PROVIDER #:        JEFFREY DARNELL DO          QUERY TEXT:    Pt admitted with sepsis. Pt noted to have significant hypotension with   documented low urine output and change in mental status. If possible, please   document in the progress notes and discharge summary if you are evaluating   and/or treating any of the following: The medical record reflects the following:  Risk Factors: sepsis  Clinical Indicators: BP down to as low as 77/31(46) on 10/14 with multiple   consecutive MAPS less than 65 and SBPs 70-80's;  Per ED provider notes patient   with decreased urine output and mental status changes/increased confusion as   well  Treatment: 2.2 L in fluid boluses, Levophed gtt being titrated to keep MAP   >65, up to as high as 30mcg/min, monitoring BP every 15 min to 1 hour,  Options provided:  -- Septic Shock  -- Hypovolemic Shock  -- Hypotension without Shock  -- Other - I will add my own diagnosis  -- Disagree - Not applicable / Not valid  -- Disagree - Clinically unable to determine / Unknown  -- Refer to Clinical Documentation Reviewer    PROVIDER RESPONSE TEXT:    This patient is in septic shock.     Query created by: Darin Quinn on 10/15/2021 9:00 AM      Electronically signed by:  Dali Zuñiga DO 10/15/2021 12:19 PM

## 2021-10-15 NOTE — PROGRESS NOTES
edema, palpitations  Gastrointestinal:  negative for abdominal pain, constipation, diarrhea, nausea, vomiting  Neurological:  negative for dizziness, headache    Medications: Allergies:     Allergies   Allergen Reactions    No Known Allergies        Current Meds:   Scheduled Meds:    cefTRIAXone (ROCEPHIN) IV  1,000 mg IntraVENous Q24H    enoxaparin  40 mg SubCUTAneous BID    [START ON 10/16/2021] aspirin  81 mg Oral Daily    [START ON 10/16/2021] DULoxetine  60 mg Oral QAM    [START ON 10/16/2021] nadolol  40 mg Oral Daily    pregabalin  150 mg Oral BID    morphine  4 mg IntraMUSCular Once    tamsulosin  0.4 mg Oral Daily    oxybutynin  5 mg Oral BID    levothyroxine  175 mcg Oral Daily    insulin glargine  30 Units SubCUTAneous BID    venlafaxine  150 mg Oral Daily    QUEtiapine  50 mg Oral Nightly    sodium chloride flush  5-40 mL IntraVENous 2 times per day    insulin lispro  0-12 Units SubCUTAneous TID WC    insulin lispro  0-6 Units SubCUTAneous Nightly     Continuous Infusions:    norepinephrine 10 mcg/min (10/15/21 1455)    sodium chloride      sodium chloride      dextrose       PRN Meds: albuterol sulfate HFA, albuterol sulfate HFA, clonazePAM, diphenoxylate-atropine, oxyCODONE-acetaminophen, sodium chloride flush, sodium chloride, acetaminophen **OR** acetaminophen, glucose, dextrose, glucagon (rDNA), dextrose    Data:     Past Medical History:   has a past medical history of Anxiety and depression, Back pain, chronic, BPH (benign prostatic hyperplasia), CHF (congestive heart failure) ([facility] Cirrhosis ([facility] Diabetes mellitus ([facility] GERD (gastroesophageal reflux disease), H/O cardiac catheterization, Hepatitis, Hiatal hernia, History of alcohol abuse, Hyperlipidemia, Hypertension, IBS (irritable bowel syndrome), Morbid obesity ([facility] Neuropathy, On home oxygen therapy, Pancreatitis, Primary osteoarthritis of right knee, Sleep apnea, Thyroid disease, and Urinary bladder neurogenic dysfunction. Social History:   reports that he has never smoked. He has never used smokeless tobacco. He reports that he does not drink alcohol and does not use drugs. Family History:   Family History   Adopted: Yes       Vitals:  BP (!) 122/53   Pulse 80   Temp 97.9 °F (36.6 °C) (Temporal)   Resp 21   Ht 5' 10\" (1.778 m)   Wt (!) 400 lb (181.4 kg)   SpO2 90%   BMI 57.39 kg/m²   Temp (24hrs), Av.1 °F (36.7 °C), Min:97.9 °F (36.6 °C), Max:98.6 °F (37 °C)    Recent Labs     10/15/21  0205 10/15/21  0403 10/15/21  0906 10/15/21  1124   POCGLU 81 122* 161* 172*       I/O (24Hr): Intake/Output Summary (Last 24 hours) at 10/15/2021 1559  Last data filed at 10/15/2021 1300  Gross per 24 hour   Intake 450 ml   Output 800 ml   Net -350 ml       Labs:  Hematology:  Recent Labs     10/14/21  1831 10/14/21  1831 10/15/21  0143 10/15/21  0532 10/15/21  1437   WBC 14.9*  --   --  48.2*  --    RBC 3.98*  --   --  3.70*  --    HGB 9.8*   < > 8.7* 9.3* 8.6*   HCT 33.2*   < > 29.6* 31.6* 29.1*   MCV 83.4  --   --  85.4  --    MCH 24.6*  --   --  25.1*  --    MCHC 29.5  --   --  29.4  --    RDW 14.6*  --   --  15.2*  --    *  --   --  157  --    MPV 11.1  --   --  10.3  --    INR 1.1  --   --  1.1  --     < > = values in this interval not displayed.      Chemistry:  Recent Labs     10/14/21  1831 10/15/21  0444 10/15/21  0532     --  137   K 3.2*  --  4.3   CL 90*  --  93*   CO2 37*  --  33*   GLUCOSE 78 122 138*   BUN 29*  --  35*   CREATININE 1.52*  --  2.18*   MG  --   --  1.5*   ANIONGAP 9  --  11   LABGLOM 48*  --  31*   GFRAA 58*  --  38*   CALCIUM 9.1  --  8.5*   CAION  --   --  1.17   PHOS  --   --  5.8*     Recent Labs     10/14/21  1831 10/14/21  2320 10/14/21  2340 10/15/21  0046 10/15/21  0205 10/15/21  0403 10/15/21  0532 10/15/21  0906 10/15/21  1124   PROT  --   --   --   --   --   --  7.0  --   --    LABALBU  --   --   --   --   --   --  3.3*  --   --    LABA1C 6.3*  --   --   --   -- --   --   --   --    AST  --   --   --   --   --   --  28  --   --    ALT  --   --   --   --   --   --  19  --   --    ALKPHOS  --   --   --   --   --   --  213*  --   --    BILITOT  --   --   --   --   --   --  1.20  --   --    POCGLU  --    < > 121* 88 81 122*  --  161* 172*    < > = values in this interval not displayed. ABG:  Lab Results   Component Value Date    POCPH 7.484 10/15/2021    PHART 7.330 06/18/2014    POCPCO2 41.0 10/15/2021    JVC8OLN 68.0 06/18/2014    POCPO2 128.4 10/15/2021    PO2ART 84.0 06/18/2014    POCHCO3 30.9 10/15/2021    NLG6JXZ 34.9 06/18/2014    NBEA NOT REPORTED 10/15/2021    PBEA 7 10/15/2021    VKC7WGD NOT REPORTED 10/15/2021    ZNRN6GXN 99 10/15/2021    M1WBODFO 96.5 06/18/2014    FIO2 3.5 10/15/2021     Lab Results   Component Value Date/Time    SPECIAL LH 8ML 10/15/2021 08:04 AM     Lab Results   Component Value Date/Time    CULTURE NO GROWTH 2 HOURS 10/15/2021 08:04 AM       Radiology:  XR CHEST PORTABLE    Result Date: 10/15/2021  1. Uncomplicated line placement. 2. Rapid development of bilateral airspace opacities favors pulmonary edema.      XR CHEST PORTABLE    Result Date: 10/14/2021  No acute cardiopulmonary process       Physical Examination:        General appearance:  alert, cooperative and no distress  Mental Status:  oriented to person, place and time and normal affect  Lungs:  clear to auscultation bilaterally, normal effort, diminished bilaterally  Heart:  regular rate and rhythm, no murmur  Abdomen:  soft, suprapubic/lower abdominal tenderness, nondistended, normal bowel sounds, no masses, hepatomegaly, splenomegaly  Extremities:  no edema, redness, tenderness in the calves  Skin:  no gross lesions, rashes, induration    Assessment:        Hospital Problems         Last Modified POA    * (Principal) Gram negative sepsis ([facility] 08/91/1047 Yes    Alcoholic cirrhosis of liver ([facility] sober since 2013 10/14/2021 Yes    Type 2 diabetes mellitus with diabetic neuropathy, with long-term current use of insulin [facility] 10/14/2021 Yes    Essential hypertension, currently hypotensive 10/15/2021 Yes    FABI (obstructive sleep apnea) 10/14/2021 Yes    Acquired hypothyroidism 10/14/2021 Yes    Anemia 10/14/2021 Yes    Gastroesophageal reflux disease without esophagitis 10/14/2021 Yes    Morbid obesity with BMI of 50.0-59.9, adult ([facility] 10/14/2021 Yes    Anxiety and depression 10/14/2021 Yes    BPH (benign prostatic hyperplasia) 86/01/2211 Yes    Diastolic congestive heart failure ([facility] 10/14/2021 Yes    Hypotension 10/14/2021 Yes    Septic shock ([facility] 10/15/2021 Yes          Plan:        Continue IV Rocephin  Monitor and control blood pressure  Insulin scale optimize glycemic control  Continue home CPAP  Continue home cardiac medications  IV fluids per sepsis protocol  PT and OT  Trend H&H  Trend labs, correct electrolytes as needed  GI and DVT prophylaxis    Oseas Dent DO  10/15/2021  3:59 PM

## 2021-10-15 NOTE — RT PROTOCOL NOTE
RT Inhaler-Nebulizer Bronchodilator Protocol Note    There is a bronchodilator order in the chart from a provider indicating to follow the RT Bronchodilator Protocol and there is an Initiate RT Inhaler-Nebulizer Bronchodilator Protocol order as well (see protocol at bottom of note). CXR Findings:  XR CHEST PORTABLE    Result Date: 10/15/2021  1. Uncomplicated line placement. 2. Rapid development of bilateral airspace opacities favors pulmonary edema. XR CHEST PORTABLE    Result Date: 10/14/2021  No acute cardiopulmonary process       The findings from the last RT Protocol Assessment were as follows:   History Pulmonary Disease: None or smoker <15 pack years  Respiratory Pattern: Regular pattern and RR 12-20 bpm  Breath Sounds: Slightly diminished and/or crackles  Cough: Strong, spontaneous, non-productive  Indication for Bronchodilator Therapy: Decreased or absent breath sounds  Bronchodilator Assessment Score: 2    Aerosolized bronchodilator medication orders have been revised according to the RT Inhaler-Nebulizer Bronchodilator Protocol below. Respiratory Therapist to perform RT Therapy Protocol Assessment initially then follow the protocol. Repeat RT Therapy Protocol Assessment PRN for score 0-3 or on second treatment, BID, and PRN for scores above 3. No Indications - adjust the frequency to every 6 hours PRN wheezing or bronchospasm, if no treatments needed after 48 hours then discontinue using Per Protocol order mode. If indication present, adjust the RT bronchodilator orders based on the Bronchodilator Assessment Score as indicated below. Use Inhaler orders unless patient has one or more of the following: on home nebulizer, not able to hold breath for 10 seconds, is not alert and oriented, cannot activate and use MDI correctly, or respiratory rate 25 breaths per minute or more, then use the equivalent nebulizer order(s) with same Frequency and PRN reasons based on the score.   If a patient is on this medication at home then do not decrease Frequency below that used at home. 0-3 - enter or revise RT bronchodilator order(s) to equivalent RT Bronchodilator order with Frequency of every 4 hours PRN for wheezing or increased work of breathing using Per Protocol order mode. 4-6 - enter or revise RT Bronchodilator order(s) to two equivalent RT bronchodilator orders with one order with BID Frequency and one order with Frequency of every 4 hours PRN wheezing or increased work of breathing using Per Protocol order mode. 7-10 - enter or revise RT Bronchodilator order(s) to two equivalent RT bronchodilator orders with one order with TID Frequency and one order with Frequency of every 4 hours PRN wheezing or increased work of breathing using Per Protocol order mode. 11-13 - enter or revise RT Bronchodilator order(s) to one equivalent RT bronchodilator order with QID Frequency and an Albuterol order with Frequency of every 4 hours PRN wheezing or increased work of breathing using Per Protocol order mode. Greater than 13 - enter or revise RT Bronchodilator order(s) to one equivalent RT bronchodilator order with every 4 hours Frequency and an Albuterol order with Frequency of every 2 hours PRN wheezing or increased work of breathing using Per Protocol order mode. RT to enter RT Home Evaluation for COPD & MDI Assessment order using Per Protocol order mode.     Electronically signed by Elio Sims RCP on 10/15/2021 at 3:52 PM

## 2021-10-15 NOTE — FLOWSHEET NOTE
Patient resting in bed; requests prayer; thanks writer for visit. Writer provides presence, support, and prayer. Spiritual Care will follow as needed. 10/15/21 1244   Encounter Summary   Services provided to: Patient   Referral/Consult From: [address] [place] [address] Street Family members   Place of Jehovah's witness None   Continue Visiting   (10/14/21)   Complexity of Encounter Low   Length of Encounter 15 minutes   Spiritual Assessment Completed Yes   Routine   Type Initial   Spiritual/Buddhist   Type Spiritual support   Assessment Calm; Approachable   Intervention Active listening;Explored feelings, thoughts, concerns;Prayer   Outcome Expressed gratitude;Expressed feelings/needs/concerns

## 2021-10-15 NOTE — H&P
[facility]  Office: [address], DO, Marisela Foxburg, DO, Spring Waite, DO, Belen Best Blood, DO, Delfino Lenz MD, Grisel Patiño MD, Inocente Honeycutt MD, Demetrius Haynes MD, Adeline Goldmann, MD, Marley Cerda MD, Abhishek Frederick MD, Johny Garrett MD, Anabelle Galeana, DO, Carito Rutherford, DO, Feli Osullivan MD,  Tessy Friday, DO, Cristy Alcantar MD, Jong Carter MD, Zeina Flores MD, Della Nieto MD, Luann Romero MD, Adeline Mercedes MD, Hannah Clayton, [facility], [facility], [facility], Avi Nurse, CNP, Jake Redman, CNS, Brent Gould, CNP, David Mckeon, CNP, Tammy Dickerson, CNP, Sav Bruce, CNP, Alondra Neg, CNP, Sharri Yuan, CNP, Nabeel Borja PA-C, Marbella Kim, DNP, Carmen Licea, CNP, Ernestina Ludwigelor, CNP, Nils Sexton, CNP, Geri Loretto, CNP, Catalina Squibb, CNP, Avi Risser, CNP, [facility], CNP         [address]    HISTORY AND PHYSICAL EXAMINATION            Date:   10/15/2021  Patient name:  Catalina Valdes  Date of admission:  10/14/2021  3:47 PM  MRN:   [id number]  Account:  [de-identified]  YOB: 1965  PCP:    Richelle Grullon MD  Room:   [place] [address]  Code Status:    Full Code    Chief Complaint:     Chief Complaint   Patient presents with    Other     Blood in [address] causing belly pain       History Obtained From:     Patient and electronic medical record    History of Present Illness:     Catalina Valdes is a 61 y.o. Non- / non  male who presents with Other (Blood in cather causing belly pain)   and is admitted to the hospital for the management of Sepsis ([facility] [address] 59-year-old male presented to the emergency room with blood in his Clemons catheter and abdominal pain. He has history of chronic indwelling Clemons catheter. Patient states that the nurses were changing the Clemons catheter today and used to size 18 rather than his normal size 20. He also feels as they were rushing while changing the catheter. He has had bleeding since that time. He does not take any blood thinners. No chest pain, shortness of breath or dizziness. No recent illnesses, fevers or coughs. Clemons was changed to the ER and urine was becoming more clear. Initially the plan was to discharge patient home but became hypotensive. He was evidence of urinary tract infection and he was eventually treated for sepsis in the emergency room. On my exam he was sleeping in the emergency room but aroused easily. He does not appear ill. Clemons catheter is in place draining tea colored urine. White count was 14.9. BUN 29 with creatinine 1.52 (close to baseline). Hypokalemic at 3.2. Normal sodium. Hemoglobin is also low at 9.8. Baseline appears to be between 11 and 12.  UA is nitrite positive with moderate leukocyte esterase, 20-50 WBCs, moderate bacteria and RBCs that are too numerous to count. Urine culture pending. Past Medical History:     Past Medical History:   Diagnosis Date    Anxiety and depression     Back pain, chronic     BPH (benign prostatic hyperplasia)     CHF (congestive heart failure) (HCC)     Cirrhosis (HCC)     Diabetes mellitus (HCC)     not checking bloodsugar at home    GERD (gastroesophageal reflux disease)     H/O cardiac catheterization not sure of date    no stents    Hepatitis     Pt does not know the type.      Hiatal hernia     History of alcohol abuse     Sober since 2013    Hyperlipidemia     not taking any medication    Hypertension     IBS (irritable bowel syndrome)     Morbid obesity ([address]     Neuropathy     feet,toes    On home oxygen therapy     prn    Pancreatitis     x 5 episodes    Primary osteoarthritis of right knee     Sleep apnea     No machine    Thyroid disease     Urinary bladder neurogenic dysfunction         Past Surgical History:     Past Surgical History:   Procedure Laterality Date    ABDOMEN SURGERY      hernia repair x4 (ventral)    COLONOSCOPY      2012    CYSTOSCOPY 01/24/2018    CYSTOSCOPY  02/20/2019    CYSTOSCOPY N/A 2/20/2019    CYSTOSCOPY DILATION performed by Javon Carrillo MD at [address] 8/23/2019    CYSTOSCOPY/ DILATION NICOLE CATHETER EXCHANGE performed by Javon Carrillo MD at [address], COLON, DIAGNOSTIC     [address]  05/20/2018    repair and reduction of recurrent incarcerated incisional hernia with mesh    [place] CYSTOURETHROSCOPY N/A 1/24/2018    CYSTOSCOPY Rachel Drilling performed by Javon Carrillo MD at [address] Bilateral 8/22/2017    FOOT [address] [facility] with bone bx performed by Jonnie Keenan DPM at [address] [facility] EGD TRANSORAL BIOPSY SINGLE/MULTIPLE N/A 7/19/2017    EGD BIOPSY performed by Emily Figueredo MD at [address]  07/19/2017    polypectomy    UPPER GASTROINTESTINAL ENDOSCOPY N/A 3/14/2019    EGD BIOPSY performed by Delfino Machado MD at [address] N/A 5/20/2018    REPAIR AND REDUCTION OF RECURRENT INCARCERATED INCISIONAL HERNIA WITH MESH performed by Latonia Ureña MD at [address] [facility]        Medications Prior to Admission:     Prior to Admission medications    Medication Sig Start Date End Date Taking? Authorizing Provider   oxyCODONE-acetaminophen (PERCOCET) 7.5-325 MG per tablet Take 1 tablet by mouth every 6 hours as needed for Pain. ; Dispense 30 1/5/21  Yes Historical Provider, MD   insulin glargine (BASAGLAR KWIKPEN) 100 UNIT/ML injection pen Inject 30 Units into the skin 2 times daily 1/15/21   Jayde Joseph MD   clonazePAM (KLONOPIN) 1 MG tablet Take 1 tablet by mouth 2 times daily as needed for Anxiety for up to 3 days. 1/14/21 1/17/21  Jayde Joseph MD   triamcinolone (KENALOG) 0.1 % ointment Apply topically daily Apply topically at bedtime as needed, as directed.     Historical Provider, MD   acetaminophen (TYLENOL) 325 MG tablet Take 2 tablets by mouth every 6 hours as needed for Pain or Fever 11/10/20   Svetlana Samayoa DO   pregabalin (LYRICA) 150 MG capsule Take 1 capsule by mouth 2 times daily for 3 days. 11/10/20 1/13/21  Svetlana Samayoa DO   spironolactone (ALDACTONE) 25 MG tablet Take 3 tablets by mouth 2 times daily Indications: pt takes 25mg and 50mg tab to equal 75mg BID 11/10/20   Svetlana Samayoa DO   bethanechol (URECHOLINE) 10 MG tablet Take 1 tablet by mouth 3 times daily 11/10/20   Camacho Samayoa DO   QUEtiapine (SEROQUEL XR) 50 MG extended release tablet Take 50 mg by mouth nightly    Historical Provider, MD   triamcinolone (KENALOG) 0.025 % cream Apply topically 2 times daily as needed    Historical Provider, MD   diphenoxylate-atropine (LOMOTIL) 2.5-0.025 MG per tablet Take 1 tablet by mouth 4 times daily as needed for Diarrhea.     Historical Provider, MD   fluocinonide (LIDEX) 0.05 % external solution Apply topically as needed (scalp itching)    Historical Provider, MD   ketoconazole (NIZORAL) 2 % shampoo Apply topically Twice a Week    Historical Provider, MD   potassium chloride (KLOR-CON) 10 MEQ extended release tablet Take 20 mEq by mouth daily (with breakfast) Takes 2 tabs (=20mEq) daily    Historical Provider, MD   albuterol sulfate HFA (PROAIR HFA) 108 (90 Base) MCG/ACT inhaler Inhale 2 puffs into the lungs every 6 hours as needed for Wheezing    Historical Provider, MD   tamsulosin (FLOMAX) 0.4 MG capsule Take 1 capsule by mouth daily 1/24/18   Shannan Cuevas MD   oxybutynin (DITROPAN) 5 MG tablet Take 1 tablet by mouth 2 times daily 12/12/17   Svetlana Samayoa DO   bumetanide (BUMEX) 2 MG tablet Take 2 mg by mouth 2 times daily    Historical Provider, MD   aspirin 81 MG EC tablet Take 1 tablet by mouth daily 7/26/17   Jo Ann Mac DO   nadolol (CORGARD) 40 MG tablet Take 40 mg by mouth daily    Historical Provider, MD   levothyroxine (SYNTHROID) 175 MCG tablet Take 175 mcg by mouth Daily     Historical Provider, MD nystatin (MYCOSTATIN) 110232 UNIT/GM powder Apply topically 2 times daily as needed TO ABDOMINAL FOLDS, GROIN     Historical Provider, MD   esomeprazole (NEXIUM) 40 MG capsule Take 40 mg by mouth 2 times daily     Historical Provider, MD   DULoxetine (CYMBALTA) 60 MG capsule Take 60 mg by mouth every morning     Historical Provider, MD   venlafaxine (EFFEXOR-XR) 150 MG XR capsule Take 150 mg by mouth daily. Historical Provider, MD        Allergies:     No known allergies    Social History:     Tobacco:    reports that he has never smoked. He has never used smokeless tobacco.  Alcohol:      reports no history of alcohol use. Drug Use:  reports no history of drug use. Family History:     Family History   Adopted: Yes       Review of Systems:     Positive and Negative as described in HPI. Review of Systems   Constitutional: Negative for activity change and fever. HENT: Negative for congestion and sinus pressure. Respiratory: Negative for cough and shortness of breath. Cardiovascular: Negative for chest pain and palpitations. Gastrointestinal: Positive for abdominal pain and nausea. Negative for blood in stool, constipation, diarrhea and vomiting. Genitourinary: Positive for hematuria. Negative for flank pain. Musculoskeletal: Negative for arthralgias and myalgias. Neurological: Negative for dizziness and headaches. Psychiatric/Behavioral: Negative for confusion and decreased concentration. Physical Exam:   BP (!) 127/48   Pulse 85   Temp 98.6 °F (37 °C)   Resp 20   Ht 5' 10\" (1.778 m)   Wt (!) 400 lb (181.4 kg)   SpO2 92%   BMI 57.39 kg/m²   Temp (24hrs), Av.6 °F (37 °C), Min:98.6 °F (37 °C), Max:98.6 °F (37 °C)    Recent Labs     10/14/21  2340 10/15/21  0046 10/15/21  0205 10/15/21  0403   POCGLU 121* 88 81 122*     No intake or output data in the 24 hours ending 10/15/21 0702    Physical Exam  Constitutional:       Appearance: Normal appearance.    HENT:      Right Ear: External ear normal.      Left Ear: External ear normal.   Eyes:      General:         Right eye: No discharge. Left eye: No discharge. Conjunctiva/sclera: Conjunctivae normal.   Cardiovascular:      Rate and Rhythm: Normal rate and regular rhythm. Pulmonary:      Effort: Pulmonary effort is normal.      Breath sounds: Normal breath sounds. Abdominal:      General: Bowel sounds are normal.      Palpations: Abdomen is soft. Tenderness: There is no abdominal tenderness. Genitourinary:     Comments: Clemons catheter in place draining tea colored urine  Musculoskeletal:         General: No tenderness or deformity. Skin:     General: Skin is warm and dry. Neurological:      General: No focal deficit present. Mental Status: He is alert and oriented to person, place, and time. Investigations:      Laboratory Testing:  Recent Results (from the past 24 hour(s))   Urinalysis Reflex to Culture    Collection Time: 10/14/21  4:50 PM    Specimen: Urine, clean catch   Result Value Ref Range    Color, UA Red (A) Yellow    Turbidity UA Cloudy (A) Clear    Glucose, Ur NEGATIVE NEGATIVE    Bilirubin Urine (A) NEGATIVE     Presumptive positive. Unable to confirm due to unavailability of reagent.     Ketones, Urine TRACE (A) NEGATIVE    Specific Gravity, UA 1.015 1.005 - 1.030    Urine Hgb 3+ (A) NEGATIVE    pH, UA 7.0 5.0 - 8.0    Protein, UA 3+ (A) NEGATIVE    Urobilinogen, Urine ELEVATED (A) Normal    Nitrite, Urine POSITIVE (A) NEGATIVE    Leukocyte Esterase, Urine MOD (A) NEGATIVE    Urinalysis Comments NOT REPORTED    Microscopic Urinalysis    Collection Time: 10/14/21  4:50 PM   Result Value Ref Range    -          WBC, UA 20 TO 50 0 - 5 /HPF    RBC, UA TOO NUMEROUS TO COUNT 0 - 2 /HPF    Casts UA NOT REPORTED /LPF    Crystals, UA NOT REPORTED None /HPF    Epithelial Cells UA 2 TO 5 0 - 5 /HPF    Renal Epithelial, UA NOT REPORTED 0 /HPF    Bacteria, UA MODERATE (A) None    Mucus, UA NOT REPORTED None    Trichomonas, UA NOT REPORTED None    Amorphous, UA NOT REPORTED None    Other Observations UA NOT REPORTED NOT REQ.     Yeast, UA NOT REPORTED None   CBC Auto Differential    Collection Time: 10/14/21  6:31 PM   Result Value Ref Range    WBC 14.9 (H) 3.5 - 11.3 k/uL    RBC 3.98 (L) 4.21 - 5.77 m/uL    Hemoglobin 9.8 (L) 13.0 - 17.0 g/dL    Hematocrit 33.2 (L) 40.7 - 50.3 %    MCV 83.4 82.6 - 102.9 fL    MCH 24.6 (L) 25.2 - 33.5 pg    MCHC 29.5 28.4 - 34.8 g/dL    RDW 14.6 (H) 11.8 - 14.4 %    Platelets 245 (L) 053 - 453 k/uL    MPV 11.1 8.1 - 13.5 fL    NRBC Automated 0.0 0.0 per 100 WBC    Differential Type NOT REPORTED     WBC Morphology NOT REPORTED     RBC Morphology NOT REPORTED     Platelet Estimate NOT REPORTED     Seg Neutrophils 98 (H) 36 - 66 %    Lymphocytes 0 (L) 24 - 44 %    Monocytes 1 1 - 7 %    Eosinophils % 1 1 - 4 %    Basophils 0 %    Immature Granulocytes 0 0 %    Segs Absolute 14.60 (H) 1.8 - 7.7 k/uL    Absolute Lymph # 0.00 (L) 1.0 - 4.8 k/uL    Absolute Mono # 0.15 (L) 0.2 - 0.8 k/uL    Absolute Eos # 0.15 0.0 - 0.4 k/uL    Basophils Absolute 0.00 0.0 - 0.2 k/uL    Absolute Immature Granulocyte 0.00 0.00 - 0.30 k/uL    Morphology INCREASED BANDS PRESENT     Morphology GIANT PLATELETS PRESENT    Basic Metabolic Panel    Collection Time: 10/14/21  6:31 PM   Result Value Ref Range    Glucose 78 70 - 99 mg/dL    BUN 29 (H) 6 - 20 mg/dL    CREATININE 1.52 (H) 0.70 - 1.20 mg/dL    Bun/Cre Ratio 19 9 - 20    Calcium 9.1 8.6 - 10.4 mg/dL    Sodium 136 135 - 144 mmol/L    Potassium 3.2 (L) 3.7 - 5.3 mmol/L    Chloride 90 (L) 98 - 107 mmol/L    CO2 37 (H) 20 - 31 mmol/L    Anion Gap 9 9 - 17 mmol/L    GFR Non-African American 48 (L) >60 mL/min    GFR  58 (L) >60 mL/min    GFR Comment          GFR Staging NOT REPORTED    APTT    Collection Time: 10/14/21  6:31 PM   Result Value Ref Range    PTT 32.1 23.9 - 33.8 sec   PROTIME-INR    Collection Time: 10/14/21  6:31 PM (H) 7.350 - 7.450    POC pCO2 41.0 35.0 - 48.0 mm Hg    POC PO2 128.4 (H) 83.0 - 108.0 mm Hg    POC HCO3 30.9 (H) 21.0 - 28.0 mmol/L    TCO2 (calc), Art NOT REPORTED 22.0 - 29.0 mmol/L    Negative Base Excess, Art NOT REPORTED 0.0 - 2.0    Positive Base Excess, Art 7 (H) 0.0 - 3.0    POC O2 SAT 99 (H) 94.0 - 98.0 %    O2 Device/Flow/% Cannula     Shay Test NOT REPORTED     Sample Site NOT REPORTED     Mode NOT REPORTED     FIO2 3.5     Pt Temp 37.0     POC pH Temp NOT REPORTED     POC pCO2 Temp NOT REPORTED mm Hg    POC pO2 Temp NOT REPORTED mm Hg   POCT glucose    Collection Time: 10/15/21  4:44 AM   Result Value Ref Range    Glucose 122 mg/dL    QC OK? ok    Magnesium    Collection Time: 10/15/21  5:32 AM   Result Value Ref Range    Magnesium 1.5 (L) 1.6 - 2.6 mg/dL   Phosphorus    Collection Time: 10/15/21  5:32 AM   Result Value Ref Range    Phosphorus 5.8 (H) 2.5 - 4.5 mg/dL   Comprehensive Metabolic Panel w/ Reflex to MG    Collection Time: 10/15/21  5:32 AM   Result Value Ref Range    Glucose 138 (H) 70 - 99 mg/dL    BUN 35 (H) 6 - 20 mg/dL    CREATININE 2.18 (H) 0.70 - 1.20 mg/dL    Bun/Cre Ratio 16 9 - 20    Calcium 8.5 (L) 8.6 - 10.4 mg/dL    Sodium 137 135 - 144 mmol/L    Potassium 4.3 3.7 - 5.3 mmol/L    Chloride 93 (L) 98 - 107 mmol/L    CO2 33 (H) 20 - 31 mmol/L    Anion Gap 11 9 - 17 mmol/L    Alkaline Phosphatase 213 (H) 40 - 129 U/L    ALT 19 5 - 41 U/L    AST 28 <40 U/L    Total Bilirubin 1.20 0.3 - 1.2 mg/dL    Total Protein 7.0 6.4 - 8.3 g/dL    Albumin 3.3 (L) 3.5 - 5.2 g/dL    Albumin/Globulin Ratio NOT REPORTED 1.0 - 2.5    GFR Non- 31 (L) >60 mL/min    GFR  38 (L) >60 mL/min    GFR Comment          GFR Staging NOT REPORTED    CBC    Collection Time: 10/15/21  5:32 AM   Result Value Ref Range    WBC 48.2 (HH) 3.5 - 11.3 k/uL    RBC 3.70 (L) 4.21 - 5.77 m/uL    Hemoglobin 9.3 (L) 13.0 - 17.0 g/dL    Hematocrit 31.6 (L) 40.7 - 50.3 %    MCV 85.4 82.6 - 102.9 fL MCH 25.1 (L) 25.2 - 33.5 pg    MCHC 29.4 28.4 - 34.8 g/dL    RDW 15.2 (H) 11.8 - 14.4 %    Platelets 656 507 - 085 k/uL    MPV 10.3 8.1 - 13.5 fL    NRBC Automated 0.0 0.0 per 100 WBC   Protime-INR    Collection Time: 10/15/21  5:32 AM   Result Value Ref Range    Protime 14.3 (H) 11.5 - 14.2 sec    INR 1.1        Imaging/Diagnostics:  XR CHEST PORTABLE    Result Date: 10/14/2021  No acute cardiopulmonary process       Assessment :      Hospital Problems         Last Modified POA    * (Principal) Sepsis ([facility] [address] 49/98/4826 Yes    Alcoholic cirrhosis of liver ([facility] [address] sober since 2013 10/14/2021 Yes    Type 2 diabetes mellitus with diabetic neuropathy, with long-term current use of insulin ([facility] [address] 10/14/2021 Yes    Essential hypertension, currently hypotensive 10/15/2021 Yes    FABI (obstructive sleep apnea) 10/14/2021 Yes    Acquired hypothyroidism 10/14/2021 Yes    Anemia 10/14/2021 Yes    Gastroesophageal reflux disease without esophagitis 10/14/2021 Yes    Morbid obesity with BMI of 50.0-59.9, adult ([facility] [address] 10/14/2021 Yes    Anxiety and depression 10/14/2021 Yes    BPH (benign prostatic hyperplasia) 38/52/1488 Yes    Diastolic congestive heart failure ([facility] [address] 10/14/2021 Yes    Hypotension 10/14/2021 Yes          Plan:     Patient status inpatient in the  Progressive Unit/Step down    Inpatient admission  Telemetry  Continue IV antibiotics  Await blood culture  Trend labs  Continue Levophed for hypotension  Will add to for occult blood and serial H&H's for anemia  Continue home medications. Hold antihypertensives  Modifications for obesity  DVT prophylaxis        Consultations:   IP CONSULT TO HOSPITALIST    Patient is admitted as inpatient status because of co-morbidities listed above, severity of signs and symptoms as outlined, requirement for current medical therapies and most importantly because of direct risk to patient if care not provided in a hospital setting. Expected length of stay > 48 hours.     SRIDHAR A LUMP,

## 2021-10-15 NOTE — PROCEDURES
PROCEDURE NOTE - CENTRAL VENOUS LINE PLACEMENT    PATIENT NAME: Zainab Austin [facility] RECORD NO. [id number]  DATE: 10/15/2021    PREOPERATIVE DIAGNOSIS:  vascular access and centrally administered medications  POSTOPERATIVE DIAGNOSIS:  Same  PROCEDURE PERFORMED:  Right Internal Jugular Vein Central Line Insertion  PERFORMING PHYSICIAN: Odilon Dupont DO  ANESTHESIA:  Local utilizing 1% lidocaine  ESTIMATED BLOOD LOSS:  Less than 25 ml  COMPLICATIONS:  None immediately appreciated. DISCUSSION:  Marivel Conner is a 61y.o.-year-old male who requires central IV access vascular access and centrally administered medications. The history and physical examination were reviewed and confirmed. CONSENT: Unable to be obtained due to patient's condition. PROCEDURE:  A timeout was initiated by the bedside nurse and was confirmed by those present. The patient was placed in a supine position. The skin overlying the Right Internal Jugular Vein was prepped with chlorhexadine and draped in sterile fashion. The skin was infiltrated with local anesthetic. The vessel and surrounding anatomy was visualized using ultrasound. Through the anesthetized region, the introducer needle was inserted into the internal jugular vein returning dark red non pulsatile blood. A guidewire was placed through the center of the needle with no resistance. Ultrasound confirmed presence of wire in the vein. A small incision made in the skin with a #11 scalpel blade. The dilator was inserted into the skin and vein over guidewire using Seldinger technique. The dilator was then removed and the 7F 16cm catheter was placed in the vein over the guidewire using Seldinger technique. The guidewire was then removed and all ports aspirated and flushed appropriately. The catheter then secured using silk suture and a temporary sterile dressing was applied. No immediate complication was evident.   All sponge, instrument and needle counts were correct at the completion of the procedure. Postprocedural chest x-ray showed good position of the catheter with no evidence of hemothorax or pneumothorax. The patient tolerated the procedure well with no immediate complication evident.      Ramona Prince DO  4:32 AM, 10/15/21

## 2021-10-15 NOTE — ED NOTES
Report called to [place], RN on progressive. Concern raised about pt blood pressure. Discussed with Charge RN, Dr. Ena Rosas, and Progressive lead. Was decided that Pt would be given midodrine to attempt to improve his BP and would be monitored in the ED for the time being.      Sammy Short RN  10/15/21 0025

## 2021-10-15 NOTE — ED NOTES
Substantial amount of blood noted coming from pt penis when existing wells was removed. Catalina Romero and Dr. Simonne Baumgarten notified. Bladder irrigation was discussed but deemed not necessary as urine started to clear & become less bloody in collection bag.      Jun Robb RN  10/14/21 1124

## 2021-10-15 NOTE — ED NOTES
Report given to Alfonzo Casillas RN. All questions answered at this time.      Jair Zendejas RN  10/15/21 5796

## 2021-10-15 NOTE — CARE COORDINATION
Case Management Initial Discharge Plan  Pricila Jenkins,             Met with:patient to discuss discharge plans. Information verified: address, contacts, phone number, , insurance Yes  Insurance Provider: medicare and aline Escamilla    Emergency Contact/Next of Kin name & number: Igor/son  Who are involved in patient's support system? family    PCP: Shane Smith MD  Date of last visit: 2 months ago      Discharge Planning    Living Arrangements:  [address] has 1 stories  Ramp stairs to climb to get into front door, 0stairs to climb to reach second floor  Location of bedroom/bathroom in home main    Patient able to perform ADL's:Dependent    Current Services (outpatient & in home) aide services. Son does not know who  DME equipment: w/c, walker,home O2, and glucometer  DME provider: unknown    Is patient receiving oral anticoagulation therapy? No    If indicated:   Physician managing anticoagulation treatment:   Where does patient obtain lab work for ATC treatment? Potential Assistance Needed:  Durable Medical Equipment    Patient agreeable to home care: Yes  Freedom of choice provided:  yes    Prior SNF/Rehab Placement and [facility]  Agreeable to SNF/Rehab: Yes  Foster of choice provided: yes     Evaluation: yes    Expected Discharge date:  10/21/21    Patient expects to be discharged to: If home: is the family and/or caregiver wiling & able to provide support at home? no  Who will be providing this support? none    Follow Up Appointment: Best Day/ Time: Monday PM    Transportation provider: ambulance    Transportation arrangements needed for discharge: Yes    Readmission Risk              Risk of Unplanned Readmission:  34             Does patient have a readmission risk score greater than 14?: Yes  If yes, follow-up appointment must be made within 7 days of discharge.      Goals of Care:       Educated sonAlice on transitional options, provided freedom of choice and are agreeable with plan      Discharge Plan: Hypotension and UTI  Patient lives with addis [facility] in a 1 story home with a ramp to enter. Patient has aide services but name of the agency is unknown. Son states that the patient has not been moving around much and feels he would benefit from a SNF. He has been to [facility] and if he qualifies, the [facility], would like him to go there. PT/OT to [facility].  Patient is on 5L NC at this time. Being treated for a UTI. Hx of ETOH abuse. WBC was 48.2- Septic  BC are positive. Continue to follow. Will need transportation.           Electronically signed by Van Monterroso RN on 10/15/21 at 2:42 PM EDT

## 2021-10-15 NOTE — ED NOTES
A marked decrease noted in patient blood pressure. Writer repeated the blood pressure twice on upper and lower arms. Catalina Ortiz notified. Will continue to closely monitor at this time.      Grupo Villa RN  10/14/21 5514

## 2021-10-16 PROBLEM — A41.59 KLEBSIELLA SEPSIS (HCC): Status: ACTIVE | Noted: 2021-10-14

## 2021-10-16 LAB
ANION GAP SERPL CALCULATED.3IONS-SCNC: 5 MMOL/L (ref 9–17)
BUN BLDV-MCNC: 26 MG/DL (ref 6–20)
BUN/CREAT BLD: 22 (ref 9–20)
CALCIUM SERPL-MCNC: 8.1 MG/DL (ref 8.6–10.4)
CHLORIDE BLD-SCNC: 101 MMOL/L (ref 98–107)
CO2: 35 MMOL/L (ref 20–31)
CREAT SERPL-MCNC: 1.16 MG/DL (ref 0.7–1.2)
CULTURE: ABNORMAL
GFR AFRICAN AMERICAN: >60 ML/MIN
GFR NON-AFRICAN AMERICAN: >60 ML/MIN
GFR SERPL CREATININE-BSD FRML MDRD: ABNORMAL ML/MIN/{1.73_M2}
GFR SERPL CREATININE-BSD FRML MDRD: ABNORMAL ML/MIN/{1.73_M2}
GLUCOSE BLD-MCNC: 122 MG/DL (ref 75–110)
GLUCOSE BLD-MCNC: 170 MG/DL (ref 75–110)
GLUCOSE BLD-MCNC: 178 MG/DL (ref 75–110)
GLUCOSE BLD-MCNC: 183 MG/DL (ref 70–99)
GLUCOSE BLD-MCNC: 188 MG/DL (ref 75–110)
HCT VFR BLD CALC: 27.2 % (ref 40.7–50.3)
HCT VFR BLD CALC: 30.7 % (ref 40.7–50.3)
HEMOGLOBIN: 7.7 G/DL (ref 13–17)
HEMOGLOBIN: 8.6 G/DL (ref 13–17)
Lab: ABNORMAL
POTASSIUM SERPL-SCNC: 3.8 MMOL/L (ref 3.7–5.3)
SODIUM BLD-SCNC: 141 MMOL/L (ref 135–144)
SPECIMEN DESCRIPTION: ABNORMAL

## 2021-10-16 PROCEDURE — 85018 HEMOGLOBIN: CPT

## 2021-10-16 PROCEDURE — 36415 COLL VENOUS BLD VENIPUNCTURE: CPT

## 2021-10-16 PROCEDURE — 6370000000 HC RX 637 (ALT 250 FOR IP): Performed by: INTERNAL MEDICINE

## 2021-10-16 PROCEDURE — 6360000002 HC RX W HCPCS: Performed by: NURSE PRACTITIONER

## 2021-10-16 PROCEDURE — 80048 BASIC METABOLIC PNL TOTAL CA: CPT

## 2021-10-16 PROCEDURE — 99232 SBSQ HOSP IP/OBS MODERATE 35: CPT | Performed by: INTERNAL MEDICINE

## 2021-10-16 PROCEDURE — 2700000000 HC OXYGEN THERAPY PER DAY

## 2021-10-16 PROCEDURE — 85014 HEMATOCRIT: CPT

## 2021-10-16 PROCEDURE — 2580000003 HC RX 258: Performed by: NURSE PRACTITIONER

## 2021-10-16 PROCEDURE — 82947 ASSAY GLUCOSE BLOOD QUANT: CPT

## 2021-10-16 PROCEDURE — 6370000000 HC RX 637 (ALT 250 FOR IP): Performed by: NURSE PRACTITIONER

## 2021-10-16 PROCEDURE — 6360000002 HC RX W HCPCS: Performed by: INTERNAL MEDICINE

## 2021-10-16 PROCEDURE — 94761 N-INVAS EAR/PLS OXIMETRY MLT: CPT

## 2021-10-16 PROCEDURE — 2000000000 HC ICU R&B

## 2021-10-16 RX ORDER — CIPROFLOXACIN 2 MG/ML
400 INJECTION, SOLUTION INTRAVENOUS EVERY 12 HOURS
Status: DISCONTINUED | OUTPATIENT
Start: 2021-10-16 | End: 2021-10-17

## 2021-10-16 RX ADMIN — ENOXAPARIN SODIUM 40 MG: 40 INJECTION SUBCUTANEOUS at 08:24

## 2021-10-16 RX ADMIN — NADOLOL 40 MG: 20 TABLET ORAL at 08:24

## 2021-10-16 RX ADMIN — INSULIN LISPRO 2 UNITS: 100 INJECTION, SOLUTION INTRAVENOUS; SUBCUTANEOUS at 20:51

## 2021-10-16 RX ADMIN — OXYCODONE AND ACETAMINOPHEN 1 TABLET: 5; 325 TABLET ORAL at 18:33

## 2021-10-16 RX ADMIN — CIPROFLOXACIN 400 MG: 2 INJECTION, SOLUTION INTRAVENOUS at 15:14

## 2021-10-16 RX ADMIN — OXYBUTYNIN CHLORIDE 5 MG: 5 TABLET ORAL at 08:24

## 2021-10-16 RX ADMIN — INSULIN GLARGINE 30 UNITS: 100 INJECTION, SOLUTION SUBCUTANEOUS at 08:34

## 2021-10-16 RX ADMIN — INSULIN LISPRO 2 UNITS: 100 INJECTION, SOLUTION INTRAVENOUS; SUBCUTANEOUS at 12:10

## 2021-10-16 RX ADMIN — INSULIN LISPRO 2 UNITS: 100 INJECTION, SOLUTION INTRAVENOUS; SUBCUTANEOUS at 18:05

## 2021-10-16 RX ADMIN — ASPIRIN 81 MG: 81 TABLET, COATED ORAL at 08:25

## 2021-10-16 RX ADMIN — DULOXETINE HYDROCHLORIDE 60 MG: 60 CAPSULE, DELAYED RELEASE ORAL at 08:24

## 2021-10-16 RX ADMIN — LEVOTHYROXINE SODIUM 175 MCG: 0.17 TABLET ORAL at 08:25

## 2021-10-16 RX ADMIN — INSULIN GLARGINE 30 UNITS: 100 INJECTION, SOLUTION SUBCUTANEOUS at 20:51

## 2021-10-16 RX ADMIN — QUETIAPINE FUMARATE 50 MG: 50 TABLET, EXTENDED RELEASE ORAL at 20:54

## 2021-10-16 RX ADMIN — TAMSULOSIN HYDROCHLORIDE 0.4 MG: 0.4 CAPSULE ORAL at 08:24

## 2021-10-16 RX ADMIN — VENLAFAXINE HYDROCHLORIDE 150 MG: 75 CAPSULE, EXTENDED RELEASE ORAL at 08:25

## 2021-10-16 RX ADMIN — OXYCODONE AND ACETAMINOPHEN 1 TABLET: 5; 325 TABLET ORAL at 09:50

## 2021-10-16 RX ADMIN — SODIUM CHLORIDE, PRESERVATIVE FREE 10 ML: 5 INJECTION INTRAVENOUS at 15:46

## 2021-10-16 RX ADMIN — CLONAZEPAM 1 MG: 0.5 TABLET ORAL at 10:17

## 2021-10-16 RX ADMIN — OXYBUTYNIN CHLORIDE 5 MG: 5 TABLET ORAL at 20:54

## 2021-10-16 RX ADMIN — SODIUM CHLORIDE: 9 INJECTION, SOLUTION INTRAVENOUS at 04:51

## 2021-10-16 RX ADMIN — ENOXAPARIN SODIUM 40 MG: 40 INJECTION SUBCUTANEOUS at 20:54

## 2021-10-16 RX ADMIN — PREGABALIN 150 MG: 100 CAPSULE ORAL at 08:25

## 2021-10-16 RX ADMIN — CLONAZEPAM 1 MG: 0.5 TABLET ORAL at 21:00

## 2021-10-16 RX ADMIN — SODIUM CHLORIDE, PRESERVATIVE FREE 10 ML: 5 INJECTION INTRAVENOUS at 21:11

## 2021-10-16 RX ADMIN — PREGABALIN 150 MG: 100 CAPSULE ORAL at 20:54

## 2021-10-16 ASSESSMENT — PAIN SCALES - GENERAL
PAINLEVEL_OUTOF10: 6
PAINLEVEL_OUTOF10: 0
PAINLEVEL_OUTOF10: 0
PAINLEVEL_OUTOF10: 2
PAINLEVEL_OUTOF10: 7
PAINLEVEL_OUTOF10: 2

## 2021-10-16 NOTE — PROGRESS NOTES
Occupational Therapy  DATE: 10/16/2021    NAME: Juanita Terrell  MRN: [id number]   : 1965    Patient not seen this date for Occupational Therapy due to:      [] Cancel by RN or physician due to:    [] Hemodialysis    [] Critical Lab Value Level     [] Blood transfusion in progress    [] Acute or unstable cardiovascular status   _MAP < 55 or more than >115  _HR < 40 or > 130    [] Acute or unstable pulmonary status   -FiO2 > 60%   _RR < 5 or >40    _O2 sats < 85%    [] Strict Bedrest    [] Off Unit for surgery or procedure    [] Off Unit for testing       [] Pending imaging to R/O fracture    [x] Refusal by Patient- 10/16 pt refused eval with max edu and encouragement and RN present and attempted to encourage as well however without success; continue to follow     [] Other      [] PT being discontinued at this time. Patient independent. No further needs. [] PT being discontinued at this time as the patient has been transferred to hospice care. No further needs.       Prince Jaramillo, OT

## 2021-10-16 NOTE — PROGRESS NOTES
[facility]  Office: [address], DO, Gwen Nyhan, DO, Nadia Renuka, DO, Justus Melchor Blood, DO, Karthikeyan Yan MD, Kika Sullivan MD, Gurdeep Monge MD, Dylan Elder MD, Carolina Griffin MD, Jani Tesfaye MD, Olivia Noriega MD, Samuel Smith MD, Rah Montejo, DO, Mariama Cuevas, DO, Heber Yap MD,  Jenny Laboy DO, Mehul Burgess MD, Lexx Chakraborty MD, Lonny Aldrich MD, Amparo Whitlock MD, Keith Cervantes MD, Rhea Cabrera MD, Geremias Head, Yayo Ellis, CNP, Olivia Bennett, CNP, Maia Hightower, CNS, Sonny Avitia, CNP, Farshad Kwong, CNP, Carter Sullivan, CNP, Haley Motley, CNP, Power Acosta, CNP, Ariadna Yancey, CNP, Devin James PA-C, Basilia Gonzales, [facility], Ta Hernandez, CNP, Carmelo Cheng, CNP, Boaz Dent, CNP, Saravanan Jay, CNP, Bela Jenkins, CNP, Nereida Smith, CNP, Vee Dent, [facility]    Progress Note    10/16/2021    2:56 PM    Name:   Juanita Terrell  MRN:     [id number]     Acct:      [de-identified]   Room:   [address] [place] [address] Day:  2  Admit Date:  10/14/2021  3:47 PM    PCP:   Sammi Daily MD  Code Status:  Full Code    Subjective:     C/C:   Chief Complaint   Patient presents with    Other     Blood in [address] [place] [address] causing belly pain     Interval History Status: improved. Patient overall feels better with decreasing lower abdominal pain and improvement in hematuria. Hearing is improving. Denies any chest pain, shortness of breath, nausea vomiting, fevers or chills. Brief History: This is a 48-year-old male with chronic indwelling Clemons presents with hematuria and lower abdominal pain. He is found evidence of urinary tract infection is admitted for IV antibiotics and further evaluation.     Review of Systems:     Constitutional:  negative for chills, fevers, sweats  Respiratory:  negative for cough, dyspnea on exertion, shortness of breath, wheezing  Cardiovascular:  negative for chest dysfunction. Social History:   reports that he has never smoked. He has never used smokeless tobacco. He reports that he does not drink alcohol and does not use drugs. Family History:   Family History   Adopted: Yes       Vitals:  BP (!) 116/58   Pulse 76   Temp 97.8 °F (36.6 °C) (Temporal)   Resp 18   Ht 5' 10\" (1.778 m)   Wt (!) 400 lb (181.4 kg)   SpO2 95%   BMI 57.39 kg/m²   Temp (24hrs), Av.2 °F (36.8 °C), Min:97.8 °F (36.6 °C), Max:98.5 °F (36.9 °C)    Recent Labs     10/15/21  1652 10/15/21  1927 10/16/21  0739 10/16/21  1147   POCGLU 216* 218* 122* 170*       I/O (24Hr): Intake/Output Summary (Last 24 hours) at 10/16/2021 1456  Last data filed at 10/16/2021 1200  Gross per 24 hour   Intake 8087.1 ml   Output 3375 ml   Net 4712.1 ml       Labs:  Hematology:  Recent Labs     10/14/21  1831 10/15/21  0143 10/15/21  0532 10/15/21  0532 10/15/21  1437 10/15/21  2300 10/16/21  0720   WBC 14.9*  --  48.2*  --   --   --   --    RBC 3.98*  --  3.70*  --   --   --   --    HGB 9.8*   < > 9.3*   < > 8.6* 8.6* 8.6*   HCT 33.2*   < > 31.6*   < > 29.1* 30.3* 30.7*   MCV 83.4  --  85.4  --   --   --   --    MCH 24.6*  --  25.1*  --   --   --   --    MCHC 29.5  --  29.4  --   --   --   --    RDW 14.6*  --  15.2*  --   --   --   --    *  --  157  --   --   --   --    MPV 11.1  --  10.3  --   --   --   --    INR 1.1  --  1.1  --   --   --   --     < > = values in this interval not displayed.      Chemistry:  Recent Labs     10/14/21  1831 10/15/21  0444 10/15/21  0532     --  137   K 3.2*  --  4.3   CL 90*  --  93*   CO2 37*  --  33*   GLUCOSE 78 122 138*   BUN 29*  --  35*   CREATININE 1.52*  --  2.18*   MG  --   --  1.5*   ANIONGAP 9  --  11   LABGLOM 48*  --  31*   GFRAA 58*  --  38*   CALCIUM 9.1  --  8.5*   CAION  --   --  1.17   PHOS  --   --  5.8*     Recent Labs     10/14/21  1831 10/14/21  2320 10/15/21  0403 10/15/21  0532 10/15/21  0906 10/15/21  1124 10/15/21  1652 10/15/21  1927 10/16/21  0739 10/16/21  1147   PROT  --   --   --  7.0  --   --   --   --   --   --    LABALBU  --   --   --  3.3*  --   --   --   --   --   --    LABA1C 6.3*  --   --   --   --   --   --   --   --   --    AST  --   --   --  28  --   --   --   --   --   --    ALT  --   --   --  19  --   --   --   --   --   --    ALKPHOS  --   --   --  213*  --   --   --   --   --   --    BILITOT  --   --   --  1.20  --   --   --   --   --   --    POCGLU  --    < >   < >  --  161* 172* 216* 218* 122* 170*    < > = values in this interval not displayed. ABG:  Lab Results   Component Value Date    POCPH 7.484 10/15/2021    PHART 7.330 06/18/2014    POCPCO2 41.0 10/15/2021    SPH7YHC 68.0 06/18/2014    POCPO2 128.4 10/15/2021    PO2ART 84.0 06/18/2014    POCHCO3 30.9 10/15/2021    PNX5ZZG 34.9 06/18/2014    NBEA NOT REPORTED 10/15/2021    PBEA 7 10/15/2021    KHA4MMC NOT REPORTED 10/15/2021    WCXJ3BPC 99 10/15/2021    U0FZMECL 96.5 06/18/2014    FIO2 3.5 10/15/2021     Lab Results   Component Value Date/Time    SPECIAL LH 8ML 10/15/2021 08:04 AM     Lab Results   Component Value Date/Time    CULTURE NO GROWTH 23 HOURS 10/15/2021 08:04 AM       Radiology:  XR CHEST PORTABLE    Result Date: 10/15/2021  1. Uncomplicated line placement. 2. Rapid development of bilateral airspace opacities favors pulmonary edema.      XR CHEST PORTABLE    Result Date: 10/14/2021  No acute cardiopulmonary process       Physical Examination:        General appearance:  alert, cooperative and no distress  Mental Status:  oriented to person, place and time and normal affect  Lungs:  clear to auscultation bilaterally, normal effort  Heart:  regular rate and rhythm, no murmur  Abdomen:  soft, nontender, nondistended, normal bowel sounds, no masses, hepatomegaly, splenomegaly  Extremities:  no edema, redness, tenderness in the calves  Skin:  no gross lesions, rashes, induration    Assessment:        Hospital Problems         Last Modified POA    * (Principal) Klebsiella sepsis ([email or web address] 43/48/8975 Yes    Alcoholic cirrhosis of liver ([email or web address] sober since 2013 10/14/2021 Yes    Type 2 diabetes mellitus with diabetic neuropathy, with long-term current use of insulin ([email or web address] 10/14/2021 Yes    Essential hypertension, currently hypotensive 10/15/2021 Yes    FABI (obstructive sleep apnea) 10/14/2021 Yes    Acquired hypothyroidism 10/14/2021 Yes    Anemia 10/14/2021 Yes    Gastroesophageal reflux disease without esophagitis 10/14/2021 Yes    Morbid obesity with BMI of 50.0-59.9, adult ([email or web address] 10/14/2021 Yes    Anxiety and depression 10/14/2021 Yes    BPH (benign prostatic hyperplasia) 56/85/4378 Yes    Diastolic congestive heart failure ([email or web address] 10/14/2021 Yes    Hypotension 10/14/2021 Yes    Septic shock ([email or web address] 10/15/2021 Yes          Plan:        Transition to IV Cipro pending further culture data.    Insulin scale  Monitor and control blood pressure  Continue home maintenance medications for heart disease, hypertension, blood sugar etc.  Wean pressors as able  IV hydration  PT and OT  CPAP as needed  GI and DVT prophylaxis  See orders for details    Keke Lucio DO  10/16/2021  2:56 PM

## 2021-10-16 NOTE — PROGRESS NOTES
Physical Therapy        Physical Therapy Cancel Note      DATE: 10/16/2021    NAME: Mindi Pham  MRN: [id number]   : 1965      Patient not seen this date for Physical Therapy due to:    Pt. Refusing and RN is aware. Will check 10/17.     Electronically signed by Jack Walter PT on 10/16/2021 at 12:51 PM

## 2021-10-17 LAB
ABSOLUTE EOS #: 0.16 K/UL (ref 0–0.4)
ABSOLUTE IMMATURE GRANULOCYTE: 0.08 K/UL (ref 0–0.3)
ABSOLUTE LYMPH #: 0.47 K/UL (ref 1–4.8)
ABSOLUTE MONO #: 0.62 K/UL (ref 0.2–0.8)
ALBUMIN SERPL-MCNC: 3.1 G/DL (ref 3.5–5.2)
ALBUMIN/GLOBULIN RATIO: ABNORMAL (ref 1–2.5)
ALP BLD-CCNC: 197 U/L (ref 40–129)
ALT SERPL-CCNC: 16 U/L (ref 5–41)
ANION GAP SERPL CALCULATED.3IONS-SCNC: 6 MMOL/L (ref 9–17)
AST SERPL-CCNC: 17 U/L
BASOPHILS # BLD: 1 %
BASOPHILS ABSOLUTE: 0.08 K/UL (ref 0–0.2)
BILIRUB SERPL-MCNC: 0.51 MG/DL (ref 0.3–1.2)
BUN BLDV-MCNC: 22 MG/DL (ref 6–20)
BUN/CREAT BLD: 23 (ref 9–20)
CALCIUM SERPL-MCNC: 8.3 MG/DL (ref 8.6–10.4)
CHLORIDE BLD-SCNC: 101 MMOL/L (ref 98–107)
CO2: 35 MMOL/L (ref 20–31)
CREAT SERPL-MCNC: 0.95 MG/DL (ref 0.7–1.2)
CULTURE: ABNORMAL
DIFFERENTIAL TYPE: ABNORMAL
EOSINOPHILS RELATIVE PERCENT: 2 % (ref 1–4)
GFR AFRICAN AMERICAN: >60 ML/MIN
GFR NON-AFRICAN AMERICAN: >60 ML/MIN
GFR SERPL CREATININE-BSD FRML MDRD: ABNORMAL ML/MIN/{1.73_M2}
GFR SERPL CREATININE-BSD FRML MDRD: ABNORMAL ML/MIN/{1.73_M2}
GLUCOSE BLD-MCNC: 103 MG/DL (ref 75–110)
GLUCOSE BLD-MCNC: 143 MG/DL (ref 75–110)
GLUCOSE BLD-MCNC: 158 MG/DL (ref 75–110)
GLUCOSE BLD-MCNC: 169 MG/DL (ref 75–110)
GLUCOSE BLD-MCNC: 97 MG/DL (ref 70–99)
HCT VFR BLD CALC: 26.6 % (ref 40.7–50.3)
HCT VFR BLD CALC: 27.9 % (ref 40.7–50.3)
HCT VFR BLD CALC: 28.6 % (ref 40.7–50.3)
HCT VFR BLD CALC: 29.5 % (ref 40.7–50.3)
HEMOGLOBIN: 7.4 G/DL (ref 13–17)
HEMOGLOBIN: 7.7 G/DL (ref 13–17)
HEMOGLOBIN: 8 G/DL (ref 13–17)
HEMOGLOBIN: 8.4 G/DL (ref 13–17)
IMMATURE GRANULOCYTES: 1 %
LYMPHOCYTES # BLD: 6 % (ref 24–44)
Lab: ABNORMAL
MCH RBC QN AUTO: 24.4 PG (ref 25.2–33.5)
MCHC RBC AUTO-ENTMCNC: 27.6 G/DL (ref 28.4–34.8)
MCV RBC AUTO: 88.6 FL (ref 82.6–102.9)
MONOCYTES # BLD: 8 % (ref 1–7)
MORPHOLOGY: ABNORMAL
MORPHOLOGY: ABNORMAL
NRBC AUTOMATED: 0 PER 100 WBC
PDW BLD-RTO: 15.3 % (ref 11.8–14.4)
PLATELET # BLD: 88 K/UL (ref 138–453)
PLATELET ESTIMATE: ABNORMAL
PMV BLD AUTO: 11.6 FL (ref 8.1–13.5)
POTASSIUM SERPL-SCNC: 3.8 MMOL/L (ref 3.7–5.3)
RBC # BLD: 3.15 M/UL (ref 4.21–5.77)
RBC # BLD: ABNORMAL 10*6/UL
SEG NEUTROPHILS: 82 % (ref 36–66)
SEGMENTED NEUTROPHILS ABSOLUTE COUNT: 6.39 K/UL (ref 1.8–7.7)
SODIUM BLD-SCNC: 142 MMOL/L (ref 135–144)
SPECIMEN DESCRIPTION: ABNORMAL
TOTAL PROTEIN: 6.4 G/DL (ref 6.4–8.3)
WBC # BLD: 7.8 K/UL (ref 3.5–11.3)
WBC # BLD: ABNORMAL 10*3/UL

## 2021-10-17 PROCEDURE — 6370000000 HC RX 637 (ALT 250 FOR IP): Performed by: INTERNAL MEDICINE

## 2021-10-17 PROCEDURE — 2580000003 HC RX 258: Performed by: NURSE PRACTITIONER

## 2021-10-17 PROCEDURE — 80053 COMPREHEN METABOLIC PANEL: CPT

## 2021-10-17 PROCEDURE — 6360000002 HC RX W HCPCS: Performed by: INTERNAL MEDICINE

## 2021-10-17 PROCEDURE — 99232 SBSQ HOSP IP/OBS MODERATE 35: CPT | Performed by: INTERNAL MEDICINE

## 2021-10-17 PROCEDURE — 97163 PT EVAL HIGH COMPLEX 45 MIN: CPT

## 2021-10-17 PROCEDURE — 85018 HEMOGLOBIN: CPT

## 2021-10-17 PROCEDURE — 97535 SELF CARE MNGMENT TRAINING: CPT

## 2021-10-17 PROCEDURE — 97112 NEUROMUSCULAR REEDUCATION: CPT

## 2021-10-17 PROCEDURE — 36415 COLL VENOUS BLD VENIPUNCTURE: CPT

## 2021-10-17 PROCEDURE — 97530 THERAPEUTIC ACTIVITIES: CPT

## 2021-10-17 PROCEDURE — 97167 OT EVAL HIGH COMPLEX 60 MIN: CPT

## 2021-10-17 PROCEDURE — 6370000000 HC RX 637 (ALT 250 FOR IP): Performed by: NURSE PRACTITIONER

## 2021-10-17 PROCEDURE — 1200000000 HC SEMI PRIVATE

## 2021-10-17 PROCEDURE — 6360000002 HC RX W HCPCS: Performed by: NURSE PRACTITIONER

## 2021-10-17 PROCEDURE — 2580000003 HC RX 258: Performed by: INTERNAL MEDICINE

## 2021-10-17 PROCEDURE — 85014 HEMATOCRIT: CPT

## 2021-10-17 PROCEDURE — 85025 COMPLETE CBC W/AUTO DIFF WBC: CPT

## 2021-10-17 PROCEDURE — 82947 ASSAY GLUCOSE BLOOD QUANT: CPT

## 2021-10-17 RX ORDER — MAGNESIUM SULFATE 1 G/100ML
1000 INJECTION INTRAVENOUS PRN
Status: DISCONTINUED | OUTPATIENT
Start: 2021-10-17 | End: 2021-10-20 | Stop reason: HOSPADM

## 2021-10-17 RX ORDER — POTASSIUM CHLORIDE 7.45 MG/ML
10 INJECTION INTRAVENOUS PRN
Status: DISCONTINUED | OUTPATIENT
Start: 2021-10-17 | End: 2021-10-20 | Stop reason: HOSPADM

## 2021-10-17 RX ORDER — CIPROFLOXACIN 500 MG/1
500 TABLET, FILM COATED ORAL EVERY 12 HOURS SCHEDULED
Status: DISCONTINUED | OUTPATIENT
Start: 2021-10-17 | End: 2021-10-20 | Stop reason: HOSPADM

## 2021-10-17 RX ORDER — POTASSIUM CHLORIDE 20 MEQ/1
40 TABLET, EXTENDED RELEASE ORAL PRN
Status: DISCONTINUED | OUTPATIENT
Start: 2021-10-17 | End: 2021-10-20 | Stop reason: HOSPADM

## 2021-10-17 RX ADMIN — CLONAZEPAM 1 MG: 0.5 TABLET ORAL at 21:28

## 2021-10-17 RX ADMIN — SODIUM CHLORIDE, PRESERVATIVE FREE 10 ML: 5 INJECTION INTRAVENOUS at 16:55

## 2021-10-17 RX ADMIN — OXYCODONE AND ACETAMINOPHEN 1 TABLET: 5; 325 TABLET ORAL at 23:04

## 2021-10-17 RX ADMIN — ENOXAPARIN SODIUM 40 MG: 40 INJECTION SUBCUTANEOUS at 21:28

## 2021-10-17 RX ADMIN — OXYCODONE AND ACETAMINOPHEN 1 TABLET: 5; 325 TABLET ORAL at 09:54

## 2021-10-17 RX ADMIN — INSULIN LISPRO 1 UNITS: 100 INJECTION, SOLUTION INTRAVENOUS; SUBCUTANEOUS at 21:29

## 2021-10-17 RX ADMIN — LEVOTHYROXINE SODIUM 175 MCG: 0.17 TABLET ORAL at 09:05

## 2021-10-17 RX ADMIN — QUETIAPINE FUMARATE 50 MG: 50 TABLET, EXTENDED RELEASE ORAL at 21:28

## 2021-10-17 RX ADMIN — SODIUM CHLORIDE: 9 INJECTION, SOLUTION INTRAVENOUS at 05:48

## 2021-10-17 RX ADMIN — INSULIN GLARGINE 30 UNITS: 100 INJECTION, SOLUTION SUBCUTANEOUS at 09:04

## 2021-10-17 RX ADMIN — INSULIN LISPRO 2 UNITS: 100 INJECTION, SOLUTION INTRAVENOUS; SUBCUTANEOUS at 12:21

## 2021-10-17 RX ADMIN — NADOLOL 40 MG: 20 TABLET ORAL at 09:06

## 2021-10-17 RX ADMIN — CIPROFLOXACIN 400 MG: 2 INJECTION, SOLUTION INTRAVENOUS at 03:48

## 2021-10-17 RX ADMIN — INSULIN GLARGINE 30 UNITS: 100 INJECTION, SOLUTION SUBCUTANEOUS at 21:28

## 2021-10-17 RX ADMIN — VENLAFAXINE HYDROCHLORIDE 150 MG: 75 CAPSULE, EXTENDED RELEASE ORAL at 09:05

## 2021-10-17 RX ADMIN — CIPROFLOXACIN 500 MG: 500 TABLET ORAL at 21:28

## 2021-10-17 RX ADMIN — DULOXETINE HYDROCHLORIDE 60 MG: 60 CAPSULE, DELAYED RELEASE ORAL at 09:11

## 2021-10-17 RX ADMIN — OXYBUTYNIN CHLORIDE 5 MG: 5 TABLET ORAL at 21:28

## 2021-10-17 RX ADMIN — OXYCODONE AND ACETAMINOPHEN 1 TABLET: 5; 325 TABLET ORAL at 16:55

## 2021-10-17 RX ADMIN — OXYBUTYNIN CHLORIDE 5 MG: 5 TABLET ORAL at 09:05

## 2021-10-17 RX ADMIN — CIPROFLOXACIN 500 MG: 500 TABLET ORAL at 09:05

## 2021-10-17 RX ADMIN — SODIUM CHLORIDE, PRESERVATIVE FREE 10 ML: 5 INJECTION INTRAVENOUS at 21:28

## 2021-10-17 RX ADMIN — ASPIRIN 81 MG: 81 TABLET, COATED ORAL at 09:05

## 2021-10-17 RX ADMIN — INSULIN LISPRO 2 UNITS: 100 INJECTION, SOLUTION INTRAVENOUS; SUBCUTANEOUS at 16:56

## 2021-10-17 RX ADMIN — PREGABALIN 150 MG: 100 CAPSULE ORAL at 21:28

## 2021-10-17 RX ADMIN — ENOXAPARIN SODIUM 40 MG: 40 INJECTION SUBCUTANEOUS at 09:04

## 2021-10-17 RX ADMIN — CLONAZEPAM 1 MG: 0.5 TABLET ORAL at 09:54

## 2021-10-17 RX ADMIN — OXYCODONE AND ACETAMINOPHEN 1 TABLET: 5; 325 TABLET ORAL at 03:48

## 2021-10-17 RX ADMIN — PREGABALIN 150 MG: 100 CAPSULE ORAL at 09:04

## 2021-10-17 RX ADMIN — TAMSULOSIN HYDROCHLORIDE 0.4 MG: 0.4 CAPSULE ORAL at 08:47

## 2021-10-17 ASSESSMENT — PAIN SCALES - GENERAL
PAINLEVEL_OUTOF10: 8

## 2021-10-17 NOTE — PROGRESS NOTES
Physical Therapy    Facility/Department: [facility] ICU  Initial Assessment    NAME: Madie Nathan  : 1965  MRN: [id number]    Date of Service: 10/17/2021    Discharge Recommendations:  Patient would benefit from continued therapy after discharge       PER HPI: This is a 50-year-old male with chronic indwelling Clemons presents with hematuria and lower abdominal pain. He is found evidence of urinary tract infection is admitted for IV antibiotics and further evaluation. Assessment   Body structures, Functions, Activity limitations: Decreased functional mobility ; Decreased safe awareness;Decreased balance;Decreased cognition;Decreased endurance;Decreased strength  Assessment: Skilled therapy needed to maximize independence with functional mobility as well as improve balance, strength, endurance and overall safety. Currently patient requires Max A x2 for bed mobility and is a HIGH fall risk. Recommend continued therapy following discharge. Specific instructions for Next Treatment: wide roll walker needed when appropriate for gait and transfers  Prognosis: Fair  Decision Making: High Complexity  Exam: AROM, strength, balance, endurance, mobility, AM-PAC  Clinical Presentation: unpredictable  PT Education: Goals;PT Role;Plan of Care;Energy Conservation;Transfer Training;General Safety  Patient Education: pursed lip breathing  REQUIRES PT FOLLOW UP: Yes  Activity Tolerance  Activity Tolerance: Patient limited by endurance; Patient limited by fatigue  Activity Tolerance: patient very anxious and begins to pant causing O2 sat to drop, dropped to 79% while doing bed mobility       Patient Diagnosis(es): The encounter diagnosis was Urinary tract infection without hematuria, site unspecified.      has a past medical history of Anxiety and depression, Back pain, chronic, BPH (benign prostatic hyperplasia), CHF (congestive heart failure) ([facility] Cirrhosis ([facility] Diabetes mellitus ([facility] GERD (gastroesophageal reflux disease), H/O cardiac catheterization, Hepatitis, Hiatal hernia, History of alcohol abuse, Hyperlipidemia, Hypertension, IBS (irritable bowel syndrome), Morbid obesity ([facility] Neuropathy, On home oxygen therapy, Pancreatitis, Primary osteoarthritis of right knee, Sleep apnea, Thyroid disease, and Urinary bladder neurogenic dysfunction. has a past surgical history that includes Colonoscopy; Abdomen surgery; hernia repair; Endoscopy, colon, diagnostic; Upper gastrointestinal endoscopy (07/19/2017); pr egd transoral biopsy single/multiple (N/A, 7/19/2017); pr deep dissec foot infec,1 bursa (Bilateral, 8/22/2017); Cystoscopy (01/24/2018); pr cystourethroscopy (N/A, 1/24/2018); Incisional hernia repair (05/20/2018); ventral hernia repair (N/A, 5/20/2018); Cystocopy (02/20/2019); Cystoscopy (N/A, 2/20/2019); Upper gastrointestinal endoscopy (N/A, 3/14/2019); and Cystoscopy (N/A, 8/23/2019). Restrictions  Restrictions/Precautions  Restrictions/Precautions: General Precautions, Contact Precautions, Fall Risk, Up as Tolerated  Position Activity Restriction  Other position/activity restrictions: 2L O2, telemetry, Clemons Cath, R jugular IV, RUE IV, elevate heels off bed, contact iso, up with assist  Vision/Hearing  Vision: Impaired (forgot glasses at home)  Vision Exceptions: Wears glasses at all times  Hearing: Within functional limits (Pt and RN report that pt was having difficulty hearing the last two days but it has improved)     Subjective  General  Chart Reviewed: Yes  Patient assessed for rehabilitation services?: Yes  Response To Previous Treatment: Patient with no complaints from previous session.   Family / Caregiver Present: No  Follows Commands: Within Functional Limits  General Comment  Comments: RNAng states patient appropriate for therapy  Subjective  Subjective: patient agreeable to work with therapy  Pain Screening  Patient Currently in Pain: Yes          Orientation  Orientation  Overall Orientation Status: Within Functional Limits  Social/Functional History  Social/Functional History  Lives With: Son (30 year old)  Type of Home: House  Home Layout: One level  Home Access: Ramped entrance  Bathroom Shower/Tub: Walk-in shower  Bathroom Toilet: Standard  Bathroom Equipment: Grab bars in shower, Shower chair  Home Equipment: [address], Rolling walker, Oxygen, Lift chair, Alert Button (3L O2 AAT)  Receives Help From: Home health (Home health aide 2x/day 7x/wk, home nursing and home PT just finished)  ADL Assistance: Needs assistance (Aide assist with sponge bathes and dressing)  Homemaking Assistance: Needs assistance (Son and Aids complete all house hold chores)  Homemaking Responsibilities: No  Ambulation Assistance: Needs assistance (uses RW)  Transfer Assistance: Needs assistance (uses lift chair AAT)  Active : No  Patient's  Info: takes transportation service-goes in w/c  Occupation: On disability  Type of occupation: , 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 Cognition   Cognition  Overall Cognitive Status: Exceptions  Arousal/Alertness: Appropriate responses to stimuli  Following Commands:  Follows one step commands consistently  Attention Span: Attends with cues to redirect  Memory: Appears intact  Safety Judgement: Decreased awareness of need for assistance  Problem Solving: Assistance required to generate solutions;Assistance required to identify errors made  Insights: Fully aware of deficits  Initiation: Requires cues for some  Sequencing: Requires cues for some    Objective     Observation/Palpation  Posture: Fair  Observation: BMI 57.39, resting in bed, discolored skin B LEs, L LE externally rotated  Edema: B LE    AROM RLE (degrees)  RLE AROM: WFL  AROM LLE (degrees)  LLE AROM : WFL  LLE General AROM: L LE extremely externally rotated, unable to hold in neutral position  AROM RUE (degrees)  RUE General AROM: refer to OT assessment  AROM LUE (degrees)  LUE General AROM: refer to OT assessment  Strength RLE  Comment: hip 3/5, knee 4-/5, ankle 4-/5  Strength LLE  Comment: hip 3/5, knee 4-/5, ankle 4-/5  Motor Control  Gross Motor?: WFL  Sensation  Overall Sensation Status: Impaired (Pt reports neuropathy in B feet)  Bed mobility  Supine to Sit: Maximum assistance;2 Person assistance  Sit to Supine: 2 Person assistance;Maximum assistance  Comment: patient highly anxious and began to panic during transfer. Assist needed to move LEs to side of bed as well get upper body up, O2 sat did drop but patient taking shallow breaths in/out of mouth, instructed in pursed lip breathing. Min-Mod A needed initially with sitting balance, but once patient relaxed was SBA. Sat EOB for 10 minutes, in that time patient thought people were sitting in empty chairs. Maxi Jacobo used to position patient in bed, patient became extremely anxious when laying flat on his back, once HOB raised he began to calm down. O2 sat dropped to 79% while doing bed mobility.  While sitting L LE externally rotated  Transfers  Sit to Stand: Unable to assess  Stand to sit: Unable to assess  Comment: did not assess due to not appropriate at this time  Ambulation  Ambulation?: No (not appropriate)     Balance  Sitting - Static: Fair  Sitting - Dynamic: Fair;-        Plan   Plan  Times per week: 1-2x/day for 5-6 days/week  Specific instructions for Next Treatment: wide roll walker needed when appropriate for gait and transfers  Current Treatment Recommendations: Strengthening, Transfer Training, Endurance Training, Neuromuscular Re-education, Patient/Caregiver Education & Training, Balance Training, Gait Training, Safety Education & Training  Safety Devices  Type of devices: Nurse notified, Patient at risk for falls, Left in bed, Call light within reach      [address] Mobility Raw Score : 10 (10/17/21 1059)  AM-PAC Inpatient T-Scale Score : 32.29 (10/17/21 1059)  Mobility Inpatient CMS 0-100% Score: 76.75 (10/17/21 1059)  Mobility Inpatient CMS G-Code Modifier : CL (10/17/21 1059)          Goals  Short term goals  Time Frame for Short term goals: 12 visits  Short term goal 1: Mod A bed mobility  Short term goal 2: Patient to perform 25 minutes ther act to facilitate improving endurance, balance, and strength  Short term goal 3: transfer and gait goals to be set once assessed  Patient Goals   Patient goals : to go home       Therapy Time   Individual Concurrent Group Co-treatment   Time In 0906         Time Out 0952 (additional 10 minutes for chart review)         Minutes 46+10=56             Treatment Time: 40 minutes  Co-treatment with OT warranted secondary to decreased safety and independence requiring 2 skilled therapy professionals to address individual discipline's goals. PT addressing pre gait trunk strengthening, weight shifting prior to transfers, transfer training and postural control in sitting.       Janis Cheung, PT

## 2021-10-17 NOTE — PLAN OF CARE
Pain controlled at intervals with prn's. Sleeps after meds. No fall/injury. P.O. intake good. Abdominal pannis excoriated. Care given. Worked with pt/ot today. Sat at edge of bed. O2 sat drops with activity or NC out of nose.

## 2021-10-17 NOTE — PROGRESS NOTES
Patient transferred to room 2021 via bed. Patient oriented to room and call light. Assessment a charted. Call light in reach.

## 2021-10-17 NOTE — PROGRESS NOTES
Occupational Therapy   Occupational Therapy Initial Assessment  Date: 10/17/2021   Patient Name: Jennifer Capone  MRN: [id number]     : 1965    RN Ishmael Eaton reports patient is medically stable for therapy treatment this date. Chart reviewed prior to treatment and patient is agreeable for therapy. All lines intact and patient positioned comfortably at end of treatment. All patient needs addressed prior to ending therapy session. Date of Service: 10/17/2021    Discharge Recommendations:  Patient would benefit from continued therapy after discharge   Pt currently functioning below baseline. Would suggest additional therapy at time of discharge to maximize long term outcomes and prevent re-admission. Please refer to AM-PAC score for current level of function. OT Equipment Recommendations  Equipment Needed: Yes  Mobility Devices: ADL Assistive Devices  ADL Assistive Devices: Long-handled Shoe Horn;Long-handled Sponge;Reacher;Sock-Aid Hard    Assessment   Performance deficits / Impairments: Decreased functional mobility ; Decreased ADL status; Decreased safe awareness;Decreased cognition;Decreased strength;Decreased endurance;Decreased sensation;Decreased fine motor control;Decreased high-level IADLs;Decreased posture;Decreased balance;Decreased coordination;Decreased vision/visual deficit  Assessment: Pt would benefit from continued skilled OT services to Maximize I and safety during functional tasks to reduce caregiver burden as able.   Prognosis: Fair  Decision Making: High Complexity  OT Education: OT Role;Transfer Training;Energy Conservation;Plan of Care;ADL Adaptive Strategies;Precautions  Patient Education: fall prevention/call light use, benefits of being oob, recommendations for continued therapy, safety in function, pursed lip breathing tech  Barriers to Learning: cognition  REQUIRES OT FOLLOW UP: Yes  Activity Tolerance  Activity Tolerance: Patient limited by fatigue;Treatment limited secondary to medical complications (free text)  Activity Tolerance: fair-, Pt is limited by respritory status at this time, unable to progress activity further d/t low SpO2  Safety Devices  Safety Devices in place: Yes  Type of devices: Nurse notified;Gait belt;Bed alarm in place;Call light within reach; Left in bed;Patient at risk for falls; All fall risk precautions in place (B heels elevated off bed for skin integrity)           Patient Diagnosis(es): The encounter diagnosis was Urinary tract infection without hematuria, site unspecified. has a past medical history of Anxiety and depression, Back pain, chronic, BPH (benign prostatic hyperplasia), CHF (congestive heart failure) ([facility] Cirrhosis ([facility] Diabetes mellitus ([facility] GERD (gastroesophageal reflux disease), H/O cardiac catheterization, Hepatitis, Hiatal hernia, History of alcohol abuse, Hyperlipidemia, Hypertension, IBS (irritable bowel syndrome), Morbid obesity ([facility] Neuropathy, On home oxygen therapy, Pancreatitis, Primary osteoarthritis of right knee, Sleep apnea, Thyroid disease, and Urinary bladder neurogenic dysfunction. has a past surgical history that includes Colonoscopy; Abdomen surgery; hernia repair; Endoscopy, colon, diagnostic; Upper gastrointestinal endoscopy (07/19/2017); pr egd transoral biopsy single/multiple (N/A, 7/19/2017); pr deep dissec foot infec,1 bursa (Bilateral, 8/22/2017); Cystoscopy (01/24/2018); pr cystourethroscopy (N/A, 1/24/2018); Incisional hernia repair (05/20/2018); ventral hernia repair (N/A, 5/20/2018); Cystocopy (02/20/2019); Cystoscopy (N/A, 2/20/2019); Upper gastrointestinal endoscopy (N/A, 3/14/2019); and Cystoscopy (N/A, 8/23/2019).      PER H&P:  This is a 50-year-old male with chronic indwelling Clemons presents with hematuria and lower abdominal pain.  He is found evidence of urinary tract infection is admitted for IV antibiotics and further evaluation. Restrictions  Restrictions/Precautions  Restrictions/Precautions: General Precautions, Contact Precautions, Fall Risk, Up as Tolerated  Position Activity Restriction  Other position/activity restrictions: 2L O2, telemetry, Clemons Cath, R jugular IV, RUE IV, elevate heels off bed, contact iso, up with assist    Subjective   General  Chart Reviewed: Yes  Patient assessed for rehabilitation services?: Yes  Family / Caregiver Present: No  Subjective  Subjective: Pt resting in bed, RN Gillian Angry in the room. Pt agreeable to OT Eval  Patient Currently in Pain: Yes  Comments / Details: Pt reports chonic painin L LE and lower back, did not rate, RN aware        Social/Functional History  Social/Functional History  Lives With: Son (30 year old)  Type of Home: House  Home Layout: One level  Home Access: Ramped entrance  Bathroom Shower/Tub: Walk-in shower  Bathroom Toilet: Standard  Bathroom Equipment: Grab bars in shower, Shower chair  Home Equipment: [address], Rolling walker, Oxygen, Lift chair, Alert Button (3L O2 AAT)  Receives Help From: Home health (Home health aide 2x/day 7x/wk, home nursing and home PT just finished)  ADL Assistance: Needs assistance (Aide assist with sponge bathes and dressing)  Homemaking Assistance: Needs assistance (Son and Aids complete all house hold chores)  Homemaking Responsibilities: No  Ambulation Assistance: Needs assistance (uses RW)  Transfer Assistance: Needs assistance (uses lift chair AAT)  Active : No  Patient's  Info: takes transportation service-goes in w/c  Occupation: On disability  Type of occupation: , 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        Objective   Vision: Impaired (forgot glasses at home)  Vision Exceptions: Wears glasses at all times  Hearing: Within functional limits (Pt and RN report that pt was having difficulty hearing the last two days but it has improved)    Orientation  Overall Orientation Status: Within Functional Limits  Observation/Palpation  Posture: Fair  Observation: BMI 57.39, resting in bed, discolored skin B LEs, L LE externally rotated  Edema: B LE       Balance  Sitting Balance: Stand by assistance (initial sit on EOB pt Min A once pt in better position to hold self up progressed to SBA)  Standing Balance:  (Not appropriate at this time, d/t resp sats)  Standing Balance  Time: Pt sat EOB ~ 10 min  Activity: sitting tolerance, pursed lip breathing  Functional Mobility  Functional Mobility Comments: Not tested, not safe or appropriate this date. SpO2 during Bed mobility dropping to 79% recovered to ~ 88% after 2 min with cues for pursed lip breathing. SpO2 staying ~ 88% while seated EOB not appropriate for further activity at this time. ADL  Feeding: Setup  Grooming: Setup;Minimal assistance  UE Bathing: Setup;Maximum assistance  LE Bathing: Dependent/Total  UE Dressing: Maximum assistance (with hosp gown)  LE Dressing: Dependent/Total  Toileting: Dependent/Total (Pt has Clemons cath)  Additional Comments: Pt is currently limited by decreased endurance and overall body habitus       Tone RUE  RUE Tone: Normotonic  Tone LUE  LUE Tone: Normotonic  Coordination  Movements Are Fluid And Coordinated: No  Coordination and Movement description: Fine motor impairments;Decreased speed;Decreased accuracy; Right UE;Left UE        Bed mobility  Supine to Sit: Maximum assistance;2 Person assistance  Sit to Supine: 2 Person assistance;Maximum assistance  Scooting: Dependent/Total (Jacobo lift used to boost pt up in bed for proper positioning)  Comment: Pt appeared VERY anxious and reports that he is \"freaking out\" during bed mobility transfer. Pt required assist with B LE's and trunk into and out of bed.  Pt taking very short shallow breathing from mouth, required MAX verbal cues to breath through nose and complete pursed lip breathing. Pt required MAX verbal cues throughout for pacing self, pursed lip breathing, proper bed mobility tech, awareness/assist with lines all to increase ease/safety. Maxi Jacobo used to position pt up higher in bed for optimal positioning, pt became VERY anxious again stating he was \"freaking out\" when [facility] was lowred, once raised pt calmed down and SpO2 began to raise with cues for pursed lip breathing. Transfers  Sit to stand: Unable to assess  Stand to sit: Unable to assess  Transfer Comments: Pt no appropriate at this time d/t SpO2 levels        Cognition  Overall Cognitive Status: Exceptions  Arousal/Alertness: Appropriate responses to stimuli  Following Commands: Follows one step commands consistently  Attention Span: Attends with cues to redirect  Memory: Appears intact  Safety Judgement: Decreased awareness of need for assistance  Problem Solving: Assistance required to generate solutions;Assistance required to identify errors made  Insights: Fully aware of deficits  Initiation: Requires cues for some  Sequencing: Requires cues for some  Cognition Comment: Pt reports seeing men in his room sitting in chairs (all chairs in room empty), states he sees them at home from time to time and the doctor told him its from his cataracts.  RN Ramin Valdez notified        Sensation  Overall Sensation Status: Impaired (Pt reports neuropathy in B feet)        LUE AROM (degrees)  LUE AROM : WFL  RUE AROM (degrees)  RUE AROM : WFL  LUE Strength  Gross LUE Strength: WFL  LUE Strength Comment: 4+/5  RUE Strength  Gross RUE Strength: WFL  RUE Strength Comment: 4+/5             Plan   Plan  Times per week: 4-5x/wk 1x/day as latrice  Current Treatment Recommendations: Strengthening, Endurance Training, Balance Training, Functional Mobility Training, Safety Education & Training, Pain Management, Self-Care / ADL, Equipment Evaluation, Education, & procurement, Positioning AM-PAC Score        AM-PAC Inpatient Daily Activity Raw Score: 13 (10/17/21 1117)  AM-PAC Inpatient ADL T-Scale Score : 32.03 (10/17/21 1117)  ADL Inpatient CMS 0-100% Score: 63.03 (10/17/21 1117)  ADL Inpatient CMS G-Code Modifier : CL (10/17/21 1117)      Goals  Short term goals  Time Frame for Short term goals: By discharge, pt to demo  Short term goal 1: tolerance for reassesment of ADL transfers and functional mobility when appropriate to add goal to POC. Short term goal 2: bed mobility to Mod A of 1 with use of bedrail and lift as needed. Short term goal 3: UB ADLs to Min A and LB ADLs to Max A with use of AD/AE as needed. Short term goal 4: increased sitting latrice > 20 min with SBA to assist with participation in ADL routine. Short term goal 5: increased B UE strength by 1/2 grade to assist with functional tasks. Long term goals  Long term goal 1: Pt/caregivers to be I with fall prevention education, EC/WS tech, B UE HEP, pursed lip breathing and pressure relief with use of handouts as needed. Patient Goals   Patient goals : To feel better and go home! Therapy Time   Individual Concurrent Group Co-treatment   Time In 0906         Time Out 1003         Minutes 57          tx time: 39 min     Co-treatment with PT warranted secondary to decreased safety and independence requiring 2 skilled therapy professionals to address individual discipline's goals.        Delaney Qiu, OT

## 2021-10-17 NOTE — PROGRESS NOTES
smoked. He has never used smokeless tobacco. He reports that he does not drink alcohol and does not use drugs. Family History:   Family History   Adopted: Yes       Vitals:  /62   Pulse 69   Temp 97.5 °F (36.4 °C) (Temporal)   Resp 17   Ht 5' 10\" (1.778 m)   Wt (!) 400 lb (181.4 kg)   SpO2 92%   BMI 57.39 kg/m²   Temp (24hrs), Av.6 °F (36.4 °C), Min:96.8 °F (36 °C), Max:98.6 °F (37 °C)    Recent Labs     10/16/21  0739 10/16/21  1147 10/16/21  1659 10/16/21  1920   POCGLU 122* 170* 178* 188*       I/O (24Hr): Intake/Output Summary (Last 24 hours) at 10/17/2021 0714  Last data filed at 10/17/2021 0548  Gross per 24 hour   Intake 5023 ml   Output 2425 ml   Net 2598 ml       Labs:  Hematology:  Recent Labs     10/14/21  1831 10/15/21  0143 10/15/21  0532 10/15/21  1437 10/16/21  1509 10/16/21  2343 10/17/21  0333   WBC 14.9*  --  48.2*  --   --   --  7.8   RBC 3.98*  --  3.70*  --   --   --  3.15*   HGB 9.8*   < > 9.3*   < > 7.7* 7.4* 7.7*   HCT 33.2*   < > 31.6*   < > 27.2* 26.6* 27.9*   MCV 83.4  --  85.4  --   --   --  88.6   MCH 24.6*  --  25.1*  --   --   --  24.4*   MCHC 29.5  --  29.4  --   --   --  27.6*   RDW 14.6*  --  15.2*  --   --   --  15.3*   *  --  157  --   --   --  88*   MPV 11.1  --  10.3  --   --   --  11.6   INR 1.1  --  1.1  --   --   --   --     < > = values in this interval not displayed.      Chemistry:  Recent Labs     10/15/21  0532 10/16/21  1509 10/17/21  0333    141 142   K 4.3 3.8 3.8   CL 93* 101 101   CO2 33* 35* 35*   GLUCOSE 138* 183* 97   BUN 35* 26* 22*   CREATININE 2.18* 1.16 0.95   MG 1.5*  --   --    ANIONGAP 11 5* 6*   LABGLOM 31* >60 >60   GFRAA 38* >60 >60   CALCIUM 8.5* 8.1* 8.3*   CAION 1.17  --   --    PHOS 5.8*  --   --      Recent Labs     10/14/21  1831 10/14/21  2320 10/15/21  0532 10/15/21  0906 10/15/21  1652 10/15/21  1927 10/16/21  0739 10/16/21  1147 10/16/21  1659 10/16/21  1920 10/17/21  0333   PROT  --   --  7.0  --   --   -- --   --   --   --  6.4   LABALBU  --   --  3.3*  --   --   --   --   --   --   --  3.1*   LABA1C 6.3*  --   --   --   --   --   --   --   --   --   --    AST  --   --  28  --   --   --   --   --   --   --  17   ALT  --   --  19  --   --   --   --   --   --   --  16   ALKPHOS  --   --  213*  --   --   --   --   --   --   --  197*   BILITOT  --   --  1.20  --   --   --   --   --   --   --  0.51   POCGLU  --    < >  --    < > 216* 218* 122* 170* 178* 188*  --     < > = values in this interval not displayed. ABG:  Lab Results   Component Value Date    POCPH 7.484 10/15/2021    PHART 7.330 06/18/2014    POCPCO2 41.0 10/15/2021    AMX5GHM 68.0 06/18/2014    POCPO2 128.4 10/15/2021    PO2ART 84.0 06/18/2014    POCHCO3 30.9 10/15/2021    SWU8ADZ 34.9 06/18/2014    NBEA NOT REPORTED 10/15/2021    PBEA 7 10/15/2021    LGZ2PKQ NOT REPORTED 10/15/2021    DMAR3FAE 99 10/15/2021    D3SJPWRQ 96.5 06/18/2014    FIO2 3.5 10/15/2021     Lab Results   Component Value Date/Time    SPECIAL LH 8ML 10/15/2021 08:04 AM     Lab Results   Component Value Date/Time    CULTURE NO GROWTH 1 DAY 10/15/2021 08:04 AM       Radiology:  XR CHEST PORTABLE    Result Date: 10/15/2021  1. Uncomplicated line placement. 2. Rapid development of bilateral airspace opacities favors pulmonary edema.      XR CHEST PORTABLE    Result Date: 10/14/2021  No acute cardiopulmonary process       Physical Examination:        General appearance:  alert, cooperative and no distress  Mental Status:  oriented to person, place and time and normal affect  Lungs:  clear to auscultation bilaterally, normal effort  Heart:  regular rate and rhythm, no murmur  Abdomen:  soft, nontender, nondistended, normal bowel sounds, no masses, hepatomegaly, splenomegaly  Extremities:  no edema, redness, tenderness in the calves  Skin:  no gross lesions, rashes, induration    Assessment:        Hospital Problems         Last Modified POA    * (Principal) [facility] 10/16/2021 Yes    Alcoholic [facility] sober since 2013 10/14/2021 Yes    Type 2 diabetes mellitus with diabetic neuropathy, with long-term current use of insulin ([facility] 10/14/2021 Yes    Essential hypertension, currently hypotensive 10/15/2021 Yes    FABI (obstructive sleep apnea) 10/14/2021 Yes    Acquired hypothyroidism 10/14/2021 Yes    Anemia 10/14/2021 Yes    Gastroesophageal reflux disease without esophagitis 10/14/2021 Yes    Morbid obesity with BMI of 50.0-59.9, adult ([facility] 10/14/2021 Yes    Anxiety and depression 10/14/2021 Yes    BPH (benign prostatic hyperplasia) 18/11/6003 Yes    Diastolic congestive heart failure ([facility] 10/14/2021 Yes    Hypotension 10/14/2021 Yes    Septic shock ([facility] 10/15/2021 Yes          Plan:        Transition to oral Cipro  Increase activity  Antihypertensives as ordered, adjust as needed  Continue home cardiac medications  Insulin scale as needed  GI and DVT prophylaxis  Nightly CPAP and as needed with naps  PT and OT  Stop IV fluids  Transfer to [facility]  Discharge planning 24 hours    Rosendo Kong DO  10/17/2021  7:14 AM

## 2021-10-18 LAB
GLUCOSE BLD-MCNC: 141 MG/DL (ref 75–110)
GLUCOSE BLD-MCNC: 143 MG/DL (ref 75–110)
GLUCOSE BLD-MCNC: 160 MG/DL (ref 75–110)
GLUCOSE BLD-MCNC: 174 MG/DL (ref 75–110)
HCT VFR BLD CALC: 28.6 % (ref 40.7–50.3)
HCT VFR BLD CALC: 29.3 % (ref 40.7–50.3)
HCT VFR BLD CALC: 30.4 % (ref 40.7–50.3)
HEMOGLOBIN: 8.2 G/DL (ref 13–17)
HEMOGLOBIN: 8.3 G/DL (ref 13–17)
HEMOGLOBIN: 8.6 G/DL (ref 13–17)

## 2021-10-18 PROCEDURE — 97116 GAIT TRAINING THERAPY: CPT

## 2021-10-18 PROCEDURE — 94640 AIRWAY INHALATION TREATMENT: CPT

## 2021-10-18 PROCEDURE — 82947 ASSAY GLUCOSE BLOOD QUANT: CPT

## 2021-10-18 PROCEDURE — 6370000000 HC RX 637 (ALT 250 FOR IP): Performed by: INTERNAL MEDICINE

## 2021-10-18 PROCEDURE — 85018 HEMOGLOBIN: CPT

## 2021-10-18 PROCEDURE — 94761 N-INVAS EAR/PLS OXIMETRY MLT: CPT

## 2021-10-18 PROCEDURE — 97168 OT RE-EVAL EST PLAN CARE: CPT

## 2021-10-18 PROCEDURE — 6360000002 HC RX W HCPCS: Performed by: INTERNAL MEDICINE

## 2021-10-18 PROCEDURE — 2580000003 HC RX 258: Performed by: INTERNAL MEDICINE

## 2021-10-18 PROCEDURE — 97530 THERAPEUTIC ACTIVITIES: CPT

## 2021-10-18 PROCEDURE — 36415 COLL VENOUS BLD VENIPUNCTURE: CPT

## 2021-10-18 PROCEDURE — 97164 PT RE-EVAL EST PLAN CARE: CPT

## 2021-10-18 PROCEDURE — 97535 SELF CARE MNGMENT TRAINING: CPT

## 2021-10-18 PROCEDURE — 99232 SBSQ HOSP IP/OBS MODERATE 35: CPT | Performed by: INTERNAL MEDICINE

## 2021-10-18 PROCEDURE — 85014 HEMATOCRIT: CPT

## 2021-10-18 PROCEDURE — 1200000000 HC SEMI PRIVATE

## 2021-10-18 PROCEDURE — 2700000000 HC OXYGEN THERAPY PER DAY

## 2021-10-18 RX ORDER — CIPROFLOXACIN 500 MG/1
500 TABLET, FILM COATED ORAL EVERY 12 HOURS SCHEDULED
Qty: 20 TABLET | Refills: 0 | Status: ON HOLD | OUTPATIENT
Start: 2021-10-18 | End: 2021-10-29 | Stop reason: HOSPADM

## 2021-10-18 RX ADMIN — TAMSULOSIN HYDROCHLORIDE 0.4 MG: 0.4 CAPSULE ORAL at 08:22

## 2021-10-18 RX ADMIN — LEVOTHYROXINE SODIUM 175 MCG: 0.17 TABLET ORAL at 05:52

## 2021-10-18 RX ADMIN — CLONAZEPAM 1 MG: 0.5 TABLET ORAL at 12:20

## 2021-10-18 RX ADMIN — CIPROFLOXACIN 500 MG: 500 TABLET ORAL at 08:21

## 2021-10-18 RX ADMIN — INSULIN LISPRO 1 UNITS: 100 INJECTION, SOLUTION INTRAVENOUS; SUBCUTANEOUS at 21:14

## 2021-10-18 RX ADMIN — OXYCODONE AND ACETAMINOPHEN 1 TABLET: 5; 325 TABLET ORAL at 19:45

## 2021-10-18 RX ADMIN — OXYBUTYNIN CHLORIDE 5 MG: 5 TABLET ORAL at 08:22

## 2021-10-18 RX ADMIN — CIPROFLOXACIN 500 MG: 500 TABLET ORAL at 21:14

## 2021-10-18 RX ADMIN — SODIUM CHLORIDE, PRESERVATIVE FREE 10 ML: 5 INJECTION INTRAVENOUS at 08:23

## 2021-10-18 RX ADMIN — INSULIN LISPRO 2 UNITS: 100 INJECTION, SOLUTION INTRAVENOUS; SUBCUTANEOUS at 10:06

## 2021-10-18 RX ADMIN — INSULIN LISPRO 2 UNITS: 100 INJECTION, SOLUTION INTRAVENOUS; SUBCUTANEOUS at 12:20

## 2021-10-18 RX ADMIN — QUETIAPINE FUMARATE 50 MG: 50 TABLET, EXTENDED RELEASE ORAL at 21:14

## 2021-10-18 RX ADMIN — DULOXETINE HYDROCHLORIDE 60 MG: 60 CAPSULE, DELAYED RELEASE ORAL at 08:22

## 2021-10-18 RX ADMIN — PREGABALIN 150 MG: 100 CAPSULE ORAL at 21:14

## 2021-10-18 RX ADMIN — ENOXAPARIN SODIUM 40 MG: 40 INJECTION SUBCUTANEOUS at 08:20

## 2021-10-18 RX ADMIN — INSULIN LISPRO 2 UNITS: 100 INJECTION, SOLUTION INTRAVENOUS; SUBCUTANEOUS at 17:54

## 2021-10-18 RX ADMIN — ENOXAPARIN SODIUM 40 MG: 40 INJECTION SUBCUTANEOUS at 21:14

## 2021-10-18 RX ADMIN — CLONAZEPAM 1 MG: 0.5 TABLET ORAL at 21:16

## 2021-10-18 RX ADMIN — ASPIRIN 81 MG: 81 TABLET, COATED ORAL at 08:21

## 2021-10-18 RX ADMIN — SODIUM CHLORIDE, PRESERVATIVE FREE 10 ML: 5 INJECTION INTRAVENOUS at 21:14

## 2021-10-18 RX ADMIN — OXYCODONE AND ACETAMINOPHEN 1 TABLET: 5; 325 TABLET ORAL at 05:52

## 2021-10-18 RX ADMIN — INSULIN GLARGINE 30 UNITS: 100 INJECTION, SOLUTION SUBCUTANEOUS at 10:06

## 2021-10-18 RX ADMIN — INSULIN GLARGINE 30 UNITS: 100 INJECTION, SOLUTION SUBCUTANEOUS at 21:14

## 2021-10-18 RX ADMIN — ALBUTEROL SULFATE 2 PUFF: 90 AEROSOL, METERED RESPIRATORY (INHALATION) at 20:51

## 2021-10-18 RX ADMIN — VENLAFAXINE HYDROCHLORIDE 150 MG: 75 CAPSULE, EXTENDED RELEASE ORAL at 08:22

## 2021-10-18 RX ADMIN — PREGABALIN 150 MG: 100 CAPSULE ORAL at 08:22

## 2021-10-18 RX ADMIN — NADOLOL 40 MG: 20 TABLET ORAL at 08:28

## 2021-10-18 RX ADMIN — OXYBUTYNIN CHLORIDE 5 MG: 5 TABLET ORAL at 21:14

## 2021-10-18 ASSESSMENT — PAIN - FUNCTIONAL ASSESSMENT: PAIN_FUNCTIONAL_ASSESSMENT: PREVENTS OR INTERFERES SOME ACTIVE ACTIVITIES AND ADLS

## 2021-10-18 ASSESSMENT — PAIN SCALES - GENERAL
PAINLEVEL_OUTOF10: 8
PAINLEVEL_OUTOF10: 8

## 2021-10-18 ASSESSMENT — PAIN DESCRIPTION - FREQUENCY: FREQUENCY: INTERMITTENT

## 2021-10-18 ASSESSMENT — PAIN DESCRIPTION - PAIN TYPE: TYPE: CHRONIC PAIN

## 2021-10-18 ASSESSMENT — PAIN DESCRIPTION - LOCATION: LOCATION: GENERALIZED

## 2021-10-18 ASSESSMENT — PAIN DESCRIPTION - DESCRIPTORS: DESCRIPTORS: DISCOMFORT

## 2021-10-18 ASSESSMENT — PAIN DESCRIPTION - ONSET: ONSET: ON-GOING

## 2021-10-18 NOTE — PROGRESS NOTES
Occupational Therapy   Occupational Therapy Re-Assessment  Date: 10/18/2021   Patient Name: Kandi Philip  MRN: [id number]     : 1965    Date of Service: 10/18/2021    Discharge Recommendations:  Patient would benefit from continued therapy after discharge    D/t recent hospitalization and medical conditions, pt would benefit from additional therapy at time of discharge to ensure safety. Please refer to AM-PAC score for current functional status. RN reports patient is medically stable for therapy treatment this date. Chart reviewed prior to treatment and patient is agreeable for therapy. All lines intact and patient positioned comfortably at end of treatment. All patient needs addressed prior to ending therapy session. Assessment   Performance deficits / Impairments: Decreased functional mobility ; Decreased ADL status; Decreased safe awareness;Decreased cognition;Decreased strength;Decreased endurance;Decreased sensation;Decreased fine motor control;Decreased high-level IADLs;Decreased posture;Decreased balance;Decreased coordination;Decreased vision/visual deficit  Assessment: Pt would benefit from continued skilled OT services to Maximize I and safety during functional tasks to reduce caregiver burden as able. Prognosis: Fair  Decision Making: High Complexity  OT Education: OT Role;Transfer Training;Energy Conservation;Plan of Care;ADL Adaptive Strategies;Precautions  Patient Education: fall prevention/call light use, benefits of being oob, recommendations for continued therapy, safety in function, pursed lip breathing tech  REQUIRES OT FOLLOW UP: Yes  Activity Tolerance  Activity Tolerance: Patient limited by fatigue  Safety Devices  Safety Devices in place: Yes  Type of devices: Nurse notified;Gait belt;Bed alarm in place;Call light within reach; Left in bed;Patient at risk for falls; All fall risk precautions in place           Patient Diagnosis(es): The encounter diagnosis was Urinary tract infection without hematuria, site unspecified. has a past medical history of Anxiety and depression, Back pain, chronic, BPH (benign prostatic hyperplasia), CHF (congestive heart failure) ([facility] [address] Cirrhosis ([facility] [address] Diabetes mellitus ([facility] [address] GERD (gastroesophageal reflux disease), H/O cardiac catheterization, Hepatitis, Hiatal hernia, History of alcohol abuse, Hyperlipidemia, Hypertension, IBS (irritable bowel syndrome), Morbid obesity ([facility] [address] Neuropathy, On home oxygen therapy, Pancreatitis, Primary osteoarthritis of right knee, Sleep apnea, Thyroid disease, and Urinary bladder neurogenic dysfunction. has a past surgical history that includes Colonoscopy; Abdomen surgery; hernia repair; Endoscopy, colon, diagnostic; Upper gastrointestinal endoscopy (07/19/2017); pr egd transoral biopsy single/multiple (N/A, 7/19/2017); pr deep dissec foot infec,1 bursa (Bilateral, 8/22/2017); Cystoscopy (01/24/2018); pr cystourethroscopy (N/A, 1/24/2018); Incisional hernia repair (05/20/2018); ventral hernia repair (N/A, 5/20/2018); Cystocopy (02/20/2019); Cystoscopy (N/A, 2/20/2019); Upper gastrointestinal endoscopy (N/A, 3/14/2019); and Cystoscopy (N/A, 8/23/2019). Restrictions  Restrictions/Precautions  Restrictions/Precautions: General Precautions, Contact Precautions, Fall Risk, Up as Tolerated  Required Braces or Orthoses?: No  Position Activity Restriction  Other position/activity restrictions: 2L O2, telemetry, Clemons Cath, R jugular IV, RUE IV, elevate heels off bed, contact iso, up with assist    Subjective   General  Chart Reviewed: Yes  Patient assessed for rehabilitation services?: Yes  Response to previous treatment: Patient with no complaints from previous session  Family / Caregiver Present: No  Subjective  Subjective: Pt resting in bed, GERMAIN Fuller in the room.  Pt agreeable to OT Eval    Social/Functional History  Social/Functional History  Lives With: Son (30 year old)  Type of Home: House  Home Layout: One level  Home Access: Ramped entrance  Bathroom Shower/Tub: Walk-in shower  Bathroom Toilet: Standard  Bathroom Equipment: Grab bars in shower, Shower chair  Home Equipment: Wheelchair-manual, Rolling walker, Oxygen, Lift chair, Alert Button (3L O2 AAT)  Receives Help From: Home health (Home health aide 2x/day 7x/wk, home nursing and home PT just finished)  ADL Assistance: Needs assistance (Aide assist with sponge bathes and dressing)  Homemaking Assistance: Needs assistance (Son and Aids complete all house hold chores)  Homemaking Responsibilities: No  Ambulation Assistance: Needs assistance (uses RW)  Transfer Assistance: Needs assistance (uses lift chair AAT)  Active : No  Patient's  Info: takes transportation service-goes in w/c  Occupation: On disability  Type of occupation: , 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 Objective   Vision: Impaired (forgot glasses at home)  Vision Exceptions: Wears glasses at all times  Hearing: Within functional limits (Pt and RN report that pt was having difficulty hearing the last two days but it has improved)    Orientation  Overall Orientation Status: Impaired  Orientation Level: Disoriented to place; Disoriented to situation  Observation/Palpation  Posture: Fair  Observation: BMI 57.39, resting in bed, discolored skin B LEs, L LE externally rotated  Edema: B LE  Balance  Sitting Balance: Stand by assistance  Standing Balance: Maximum assistance (x2)  Standing Balance  Time: Pt stood ~1 min  Activity: standing EOB to adjust bed sheets  Functional Mobility  Functional - Mobility Device: Rolling Walker  Activity: Other (EOB)  Assist Level: Maximum assistance (x2)  Functional Mobility Comments: Pt took one step forward and one side step. MAX A x2 for balance and safety.  MAX VCs for RW safety/mgmt, assistance/awarenss of lines, upright posture, and scannign room environment. ADL  Feeding: Setup  Grooming: Setup;Minimal assistance  UE Bathing: Setup;Maximum assistance  LE Bathing: Dependent/Total  UE Dressing: Maximum assistance  LE Dressing: Dependent/Total (DEP to don B socks)  Toileting: Dependent/Total (Clemons cath)  Additional Comments: Pt is currently limited by decreased endurance and overall body habitus  Tone RUE  RUE Tone: Normotonic  Tone LUE  LUE Tone: Normotonic  Coordination  Movements Are Fluid And Coordinated: No  Coordination and Movement description: Fine motor impairments;Decreased speed;Decreased accuracy; Right UE;Left UE     Bed mobility  Supine to Sit: Moderate assistance  Sit to Supine: Maximum assistance;2 Person assistance  Scooting: Maximal assistance;2 Person assistance  Comment: Pt anxious during bed mob transfer from sit to supine. Pt required MAX VCs for pursed lip breathing, proper bed mob tech, pacing, and assist/awarenss of lines to increase safety/ease. Pt took short and shallow breathes from mouth and had poor carryover of pursed lip  breathing education. Once pt was back into bed, pt's SPO2 decreased to ~83% and with ~5 min rest and constant cues for pursed lip breathing, pt SPO2 returned to 91%  Transfers  Sit to stand: Maximum assistance;2 Person assistance  Stand to sit: Maximum assistance;2 Person assistance  Transfer Comments: Pt unable to stand from bed at a lowered level. Pt stood from the raised bed with MAX A x2 and pt pulled up on RW rather than pushing up from the bed. Pt required MAX VC/tactile cues for proper hand placement, controlled sit<>stand, and RW mgmt/safety. Cognition  Overall Cognitive Status: Exceptions  Arousal/Alertness: Appropriate responses to stimuli  Following Commands:  Follows one step commands consistently  Attention Span: Attends with cues to redirect  Memory: Decreased short term memory;Decreased recall of recent events;Decreased recall of biographical Information  Safety Judgement: Decreased awareness of need for assistance  Problem Solving: Assistance required to generate solutions;Assistance required to identify errors made  Insights: Fully aware of deficits  Initiation: Requires cues for some  Sequencing: Requires cues for some        Sensation  Overall Sensation Status: Impaired (Pt reports neuropathy in B feet)        LUE AROM (degrees)  LUE AROM : WFL  RUE AROM (degrees)  RUE AROM : WFL  LUE Strength  Gross LUE Strength: WFL  LUE Strength Comment: 4+/5  RUE Strength  Gross RUE Strength: WFL  RUE Strength Comment: 4+/5                   Plan   Plan  Times per week: 4-5x/wk 1x/day as latrice  Current Treatment Recommendations: Strengthening, Endurance Training, Balance Training, Functional Mobility Training, Safety Education & Training, Pain Management, Self-Care / ADL, Equipment Evaluation, Education, & procurement, Positioning      AM-PAC Score        AM-[facility] Inpatient Daily Activity Raw Score: 13 (10/18/21 1143)  AM-PAC Inpatient ADL T-Scale Score : 32.03 (10/18/21 1143)  ADL Inpatient CMS 0-100% Score: 63.03 (10/18/21 1143)  ADL Inpatient CMS G-Code Modifier : CL (10/18/21 1143)    Goals  Short term goals  Time Frame for Short term goals: By discharge, pt to demo  Short term goal 1: Mod A for ADL transfers and functional mob with AD as needed and Good safety  Short term goal 2: bed mobility to Mod A of 1 with use of bedrail and lift as needed. Short term goal 3: UB ADLs to Min A and LB ADLs to Max A with use of AD/AE as needed. Short term goal 4: increased sitting latrice > 20 min with SBA to assist with participation in ADL routine. Short term goal 5: increased B UE strength by 1/2 grade to assist with functional tasks. Long term goals  Long term goal 1: Pt/caregivers to be I with fall prevention education, EC/WS tech, B UE HEP, pursed lip breathing and pressure relief with use of handouts as needed.   Patient Goals Patient goals : To feel better and go home! Therapy Time   Individual Concurrent Group Co-treatment   Time In 1030         Time Out 2188 (+ 10 chart review)         Minutes 58          tx time: 45 min        Co-treatment with PT warranted secondary to decreased safety and independence requiring 2 skilled therapy professionals to address individual discipline's goals. OT addressing preparation for ADL transfer, sitting balance for increased ADL performance, sitting/activity tolerance, functional reaching, environmental safety/scanning, fall prevention, functional mobility for ADL transfers, ability to sequence and follow directions and functional UE strength. Upon writer exit, call light within reach, pt retired to bed. All lines intact and patient positioned comfortably. All patient needs addressed prior to ending therapy session. Chart reviewed prior to treatment and patient is agreeable for therapy. RN reports patient is medically stable for therapy treatment this date.     Carley Flynn OTR/L

## 2021-10-18 NOTE — CARE COORDINATION
Spoke to patient regarding recommendation for SNF and pt adamant about returning home. VM left for BRITTNEY Jimenez at [employer] on [facility] regarding current [place] [phone] agency. Awaiting call back.

## 2021-10-18 NOTE — PROGRESS NOTES
Physician Progress Note      Avinash Schulte  CSN #:                  [id number]  :                       1965  ADMIT DATE:       10/14/2021 3:47 PM  DISCH DATE:  RESPONDING  PROVIDER #:        JEFFREY DARNELL DO          QUERY TEXT:    Pt admitted with sepsis with septic shock due to indwelling catheter   associated UTI. Noted documentation of \"concern for possible development of   LEONARDA\" by ED provider on 10/15. If possible, please document in progress notes   and discharge summary:    The medical record reflects the following:  Risk Factors: sepsis with septic shock with BP down to 70/40's on 10/15, Bumex   and Aldactone as home medications  Clinical Indicators: bun/cr/gfr:  29/1.52/48 on 10/14 to 35/2.18/31 on 10/15,   improved to 22/0.95/>60 on 10/17  Treatment: 2.1 L bolus on admission, IVFs 50ml/hr, Levophed gtt for BP support   10/15-10/16, monitoring BMP  Options provided:  -- LEONARDA confirmed present on admission, treated and resolved  -- LEONARDA ruled out  -- Other - I will add my own diagnosis  -- Disagree - Not applicable / Not valid  -- Disagree - Clinically unable to determine / Unknown  -- Refer to Clinical Documentation Reviewer    PROVIDER RESPONSE TEXT:    The diagnosis of LEONARDA was confirmed as present on admission, was treated and   has resolved.     Query created by: Kole Brown on 10/18/2021 11:21 AM      Electronically signed by:  Lena Mcdonough DO 10/18/2021 3:26 PM

## 2021-10-18 NOTE — CARE COORDINATION
Social Work-Met with patient. Discussed SNF for rehab. Patient refused. Patient is a max assist of 2 for transfers. Patient stated that he will go home with home care, but will not go to SNF. Requested a 3PM transport time with [facility].  Cori Mckeon

## 2021-10-18 NOTE — PROGRESS NOTES
understanding verbalized, no evidence of learning. Pt would benefit from continued reinforcement of education. REQUIRES PT FOLLOW UP: Yes  Activity Tolerance  Activity Tolerance: Patient limited by endurance; Patient limited by fatigue  Activity Tolerance: patient very anxious and begins to pant causing O2 sat to drop, dropped to 79% while doing bed mobility       Patient Diagnosis(es): The encounter diagnosis was Urinary tract infection without hematuria, site unspecified. has a past medical history of Anxiety and depression, Back pain, chronic, BPH (benign prostatic hyperplasia), CHF (congestive heart failure) ([address] Cirrhosis ([facility] [address] Diabetes mellitus ([facility] [address] GERD (gastroesophageal reflux disease), H/O cardiac catheterization, Hepatitis, Hiatal hernia, History of alcohol abuse, Hyperlipidemia, Hypertension, IBS (irritable bowel syndrome), Morbid obesity ([address] Neuropathy, On home oxygen therapy, Pancreatitis, Primary osteoarthritis of right knee, Sleep apnea, Thyroid disease, and Urinary bladder neurogenic dysfunction. has a past surgical history that includes Colonoscopy; Abdomen surgery; hernia repair; Endoscopy, colon, diagnostic; Upper gastrointestinal endoscopy (07/19/2017); pr egd transoral biopsy single/multiple (N/A, 7/19/2017); pr deep dissec foot infec,1 bursa (Bilateral, 8/22/2017); Cystoscopy (01/24/2018); pr cystourethroscopy (N/A, 1/24/2018); Incisional hernia repair (05/20/2018); ventral hernia repair (N/A, 5/20/2018); Cystocopy (02/20/2019); Cystoscopy (N/A, 2/20/2019); Upper gastrointestinal endoscopy (N/A, 3/14/2019); and Cystoscopy (N/A, 8/23/2019).     Restrictions  Restrictions/Precautions  Restrictions/Precautions: General Precautions, Contact Precautions, Fall Risk, Up as Tolerated  Required Braces or Orthoses?: No  Position Activity Restriction  Other position/activity restrictions: 2L O2, telemetry, Clemons Cath, R jugular IV, RUE IV, elevate heels off bed, contact iso, up with assist  Vision/Hearing  Vision: Impaired (states even w/ glassess he can't see)  Vision Exceptions: Wears glasses at all times  Hearing: Within functional limits (Pt and RN report that pt was having difficulty hearing the last two days but it has improved)     Subjective  General  Patient assessed for rehabilitation services?: Yes  Response To Previous Treatment: Patient with no complaints from previous session. Family / Caregiver Present: No  General Comment  Comments: RN agreeable to therapy. Pt supine in bed upon arrival.  Pt requiring encouragment but cooperative throughout. Subjective  Subjective: Pt reporting he is going home today. Orientation  Orientation  Overall Orientation Status: Impaired  Orientation Level: Disoriented to place; Disoriented to situation;Oriented to time;Oriented to person  Social/Functional History  Social/Functional History  Lives With: Son (30 year old)  Type of Home: House  Home Layout: One level  Home Access: Ramped entrance  Bathroom Shower/Tub: Walk-in shower  Bathroom Toilet: Standard  Bathroom Equipment: Grab bars in shower, Shower chair  Home Equipment: Benitez Sa[place]l, Rolling walker, Oxygen, Lift chair, Alert Button (3L O2 AAT)  Receives Help From: Home health (Home health aide 2x/day 7x/wk, home nursing and home PT just finished)  ADL Assistance: Needs assistance (Aide assist with sponge bathes and dressing)  Homemaking Assistance: Needs assistance (Son and Aids complete all house hold chores)  Homemaking Responsibilities: No  Ambulation Assistance: Needs assistance (uses RW)  Transfer Assistance: Needs assistance (uses lift chair AAT)  Active : No  Patient's  Info: takes transportation service-goes in w/c  Occupation: On disability  Type of occupation: , 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 Cognition   Cognition  Overall Cognitive Status: Exceptions  Arousal/Alertness: Appropriate responses to stimuli;Delayed responses to stimuli  Following Commands: Follows multistep commands with increased time; Follows multistep commands with repitition  Attention Span: Attends with cues to redirect  Memory: Decreased short term memory;Decreased recall of biographical Information;Decreased recall of recent events  Safety Judgement: Decreased awareness of need for assistance;Decreased awareness of need for safety  Problem Solving: Assistance required to generate solutions;Assistance required to identify errors made;Decreased awareness of errors;Assistance required to correct errors made;Assistance required to implement solutions  Insights: Decreased awareness of deficits  Initiation: Requires cues for some  Sequencing: Requires cues for all    Objective     Observation/Palpation  Posture: Fair  Observation: discolored skin BLE, BLE externally rotated  Edema: BLE        Tone RLE  RLE Tone: Normotonic  Tone LLE  LLE Tone: Normotonic  Motor Control  Gross Motor?: WFL  Sensation  Overall Sensation Status: Impaired (Pt reports neuropathy in B feet)  Bed mobility  Supine to Sit: Maximum assistance  Sit to Supine: Maximum assistance;2 Person assistance  Scooting: Maximal assistance;2 Person assistance  Comment: Pt w/ significant difficulty throughout bed mobility this date requiring MAX verbal cueing for use of bedrails and pursed lip breathing throughout w/ POOR return demo. Pt requiring assist for progression of BLE and trunk throughout. Pt requiring increased time and significantly increased effort and tending to mouth breath despite MAX verbal cueing w/ demo for pursed lip breathing. Upon returning to bed, pt's SpO2 had dropped to mid 80s but josue to 91% w/ 3-5 minutes of rest w/ pursed lip breathing.   Transfers  Sit to Stand: Maximum Assistance;2 Person Assistance  Stand to sit: Maximum Assistance;2 Person Assistance  Comment: Pt w/ POOR steadiness throughout transfers this date demonstrating improper hand placement throughout despite max verbal and tactile cueing. Pt performed 2 STS transfers this date throughout session w/ POOR standing tolerance, able to stand for <2-3 minutes. Ambulation  Ambulation?: Yes  More Ambulation?: No  Ambulation 1  Surface: level tile  Device: Rolling Walker  Other Apparatus: O2  Assistance: Maximum assistance;2 Person assistance  Gait Deviations: Slow Lita;Staggers;Decreased step length;Decreased step height; Increased DONAL  Distance: 2 steps  Comments: Pt w/ POOR steadiness throughout ambulation this date, requiring MAX verbal and tactile cueing for progression of BLE and RW throughout w/ POOR return demo.   Stairs/Curb  Stairs?: No     Balance  Posture: Poor  Sitting - Static: Fair  Sitting - Dynamic: Fair;-  Standing - Static: Poor  Standing - Dynamic: Poor;-  Comments: Standing balance assessed w/ RW  Exercises  Straight Leg Raise: x 5 BLE  Heelslides: x 5 BLE  Hip Flexion: supine x 5 BLE  Hip Abduction: supine x 5 BLE  Ankle Pumps: x 10 BLE  Upper Extremity: Serratus punches x 5 BUE  Shoulder Active Range of Motion: Shoulder flexion x 5 BUE     Plan   Plan  Times per week: 1-2x/day for 5-6 days/week  Specific instructions for Next Treatment: wide roll walker needed when appropriate for gait and transfers  Current Treatment Recommendations: Strengthening, Transfer Training, Endurance Training, Neuromuscular Re-education, Patient/Caregiver Education & Training, Balance Training, Gait Training, Safety Education & Training, Home Exercise Program, Equipment Evaluation, Education, & procurement, Functional Mobility Training  Safety Devices  Type of devices: Call light within reach, Gait belt, Nurse notified, Left in bed  Restraints  Initially in place: No    AM-PAC Score  AM-PAC Inpatient Mobility Raw Score : 11 (10/18/21 1219)  -PAC Inpatient T-Scale Score : 33.86 (10/18/21 1219)  Mobility Inpatient CMS 0-100% Score: 72.57 (10/18/21 1219)  Mobility Inpatient CMS G-Code Modifier : CL (10/18/21 1219)       Functional Outcome Measure-   Single Leg Stance Test:  0 sec. (<5 sec.= fall risk)    Goals  Short term goals  Time Frame for Short term goals: 12 visits  Short term goal 1: Pt to demonstrate bed mobility ModA  Short term goal 2: Pt to perform STS transfers w/ RW demonstrating proper hand placement MaxA  Short term goal 3: Pt to ambulate at least 15ft w/ RW MaxA  Short term goal 4: Pt to actively participate in at least 30 minutes of physical therapy for ther ex, ther act, balance, gait, and transfers  Patient Goals   Patient goals : to go home       Therapy Time   Individual Concurrent Group Co-treatment   Time In 1031         Time Out 1128         Minutes 57           Treatment time: 45 minutes      Co-treatment with OT warranted secondary to decreased safety and independence requiring 2 skilled therapy professionals to address individual discipline's goals.      Jason Alonso, PT

## 2021-10-18 NOTE — PROGRESS NOTES
Patient complaining of SOB at rest and is hallucinating. He is currently on 4L/min O2 per NC. Dr. Nicolette Harvey notified. No new orders. Will continue to monitor.

## 2021-10-18 NOTE — CARE COORDINATION
Social Work-[facility] did not approve patient due to his weight.  Will meet with patient for other choices [facility]

## 2021-10-18 NOTE — FLOWSHEET NOTE
Patient shared that he hopes to be discharged today. Thanked me for visit, but expressed \"don't need anything. \" Left behind \"prayer for the sick\" card.         10/18/21 0930   Encounter Summary   Services provided to: Patient   Referral/Consult From: [facility] Family members   Continue Visiting   (10/18/21)   Complexity of Encounter Low   Length of Encounter 15 minutes   Routine   Type Follow up   Assessment Approachable;Coping   Intervention Active listening;Provided reading materials/devotional materials;Sustaining presence/ Ministry of presence   Outcome Expressed gratitude

## 2021-10-18 NOTE — CARE COORDINATION
Call back from [address] [facility] supervisor with [employer]. Pt was with [facility] and was D/C from services this hospitalization. VM left for nursing agency [phone] to call regarding services at discharge.

## 2021-10-18 NOTE — DISCHARGE INSTR - COC
Continuity of Care Form    Patient Name: Nakia Zurita   :  1965  MRN:  [id number]    Admit date:  10/14/2021  Discharge date:  10/20/21    Code Status Order: Full Code   Advance Directives:      Admitting Physician:  Yahir Zepeda DO  PCP: Clara Griffin MD    Discharging Nurse: Shey Davis RN  [facility] Unit/Room#:   Discharging Unit Phone Number: [phone]    Emergency Contact:   Extended Emergency Contact Information  Primary Emergency Contact: Ra Doherty [address] Phone: [phone]  Work Phone: [phone]  Mobile Phone: [phone]  Relation: Child  Secondary Emergency Contact: Stevenson Whalen [address] Phone: [phone]  Work Phone: [phone]  Mobile Phone: [phone]  Relation: Child    Past Surgical History:  Past Surgical History:   Procedure Laterality Date    ABDOMEN SURGERY      hernia repair x4 (ventral)    COLONOSCOPY      2012    CYSTOSCOPY  2018    CYSTOSCOPY  2019    CYSTOSCOPY N/A 2019    CYSTOSCOPY DILATION performed by Clint Lund MD at [address] 2019    CYSTOSCOPY/ [address] performed by Clint Lund MD at [address] [place] [address], COLON, DIAGNOSTIC     [address]  2018    repair and reduction of recurrent incarcerated incisional hernia with mesh    [place] CYSTOURETHROSCOPY N/A 2018    CYSTOSCOPY Emma Picket performed by Clint Lund MD at [address] Bilateral 2017    FOOT [address] [facility] with bone bx performed by Megan Velez DPM at [address] EGD TRANSORAL BIOPSY SINGLE/MULTIPLE N/A 2017    EGD BIOPSY performed by Michelle Godfrey MD at [address] [facility]  2017    polypectomy    UPPER GASTROINTESTINAL ENDOSCOPY N/A 3/14/2019    EGD BIOPSY performed by Perlita Collazo MD at  [facility] [address] VENTRAL HERNIA REPAIR N/A 5/20/2018    REPAIR AND REDUCTION OF RECURRENT INCARCERATED INCISIONAL HERNIA WITH MESH performed by Lawrence Lyon MD at [address] [facility]       Immunization History: There is no immunization history on file for this patient. Active Problems:  Patient Active Problem List   Diagnosis Code    Alcoholic cirrhosis of liver ([facility] sober since 2013 K70.30    Peripheral edema R60.9    Bipolar disorder ([facility] F31.9    Type 2 diabetes mellitus with diabetic neuropathy, with long-term current use of insulin ([facility] E11.40, Z79.4    Essential hypertension, currently hypotensive I10    Dyslipidemia E78.5    Weakness R53.1    Hyponatremia W40.7    Metabolic encephalopathy V41.16    FABI (obstructive sleep apnea) G47.33    Primary osteoarthritis of right knee M17.11    Type 2 diabetes mellitus with diabetic neuropathy (HCC) E11.40    Acquired hypothyroidism E03.9    Urine retention R33.9    Anemia D64.9    Cellulitis of right lower extremity L03.115    Slow transit constipation K59.01    Constipation K59.00    Iron deficiency anemia due to chronic blood loss D50.0    Gastroesophageal reflux disease without esophagitis K21.9    LEONARDA (acute kidney injury) (HCC) N17.9    Secondary esophageal varices without bleeding (HCC) I85.10    Portal hypertension (HCC) K76.6    Morbid obesity with BMI of 50.0-59.9, adult (HCC) E66.01, Z68.43    Venous stasis dermatitis of both lower extremities I87.2    Cellulitis of left lower extremity L03. 116    Cellulitis of perineum L03.315    Epididymoorchitis N45.3    Abdominal pain R10.9    Multiple and open wound of lower limb S81.809A    Scrotal edema N50.89    Retention, urine R33.9    SBO (small bowel obstruction) ([facility] K56.609    Incisional hernia with obstruction but no gangrene K43.0    Chronic indwelling Clemons catheter Z97.8    Anxiety and depression F41.9, F32. A    Back pain, chronic M54.9, G89.29    BPH (benign prostatic hyperplasia) V33.7    Diastolic congestive heart failure (HCC) I50.30    Cirrhosis ([facility] K74.60    Diabetes mellitus ([facility] E11.9    GERD (gastroesophageal reflux disease) K21.9    H/O cardiac catheterization Z98.890    Hepatitis K75.9    Hiatal hernia K44.9    History of alcohol abuse F10.11    Hypertension I10    IBS (irritable bowel syndrome) K58.9    Morbid obesity (HCC) E66.01    Neuropathy G62.9    On home oxygen therapy Z99.81    Pancreatitis K85.90    Sleep apnea G47.30    Thyroid disease E07.9    Urinary bladder neurogenic dysfunction N31.9    Urinary tract infection associated with indwelling urethral catheter (HCC) T83.511A, N39.0    Delirium due to another medical condition F05    Musculoskeletal immobility Z74.09    Pyocystis N30.80    Myoclonic jerking G25.3    Poisoning by insulin and oral hypoglycemic (antidiabetic) drugs, accidental (unintentional), initial encounter T38.3X1A    Thrombocytopenia (HCC) D69.6    Hypotension I95.9    Klebsiella sepsis ([facility]) A41.59    Septic shock ([facility]) A41.9, R65.21       Isolation/Infection:   Isolation            Contact          Patient Infection Status       Infection Onset Added Last Indicated Last Indicated By Review Planned Expiration Resolved Resolved By    MDRO (multi-drug resistant organism) 10/14/21 10/16/21 10/14/21 Culture, Blood 1                Nurse Assessment:  Last Vital Signs: BP (!) 151/59   Pulse 64   Temp 97.7 °F (36.5 °C) (Oral)   Resp 18   Ht 5' 10\" (1.778 m)   Wt (!) 400 lb (181.4 kg)   SpO2 94%   BMI 57.39 kg/m²     Last documented pain score (0-10 scale): Pain Level: 8  Last Weight:   Wt Readings from Last 1 Encounters:   10/18/21 (!) 400 lb (181.4 kg)     Mental Status:  oriented    IV Access:  - None    Nursing Mobility/ADLs:  Walking   Assisted  Transfer  Assisted  Bathing  Assisted  Dressing  Assisted  Toileting  Assisted  Feeding  Independent  Med Admin  Independent  Med Delivery   whole    Wound Care Documentation and Therapy:  Wound 11/04/20 Leg Left; Lower; Posterior (Active)   Number of days: 348       Wound Nose Left (Active)   Number of days:         Elimination:  Continence:   · Bowel: No  · Bladder: No  Urinary Catheter: None   Colostomy/Ileostomy/Ileal Conduit: No       Date of Last BM: ***    Intake/Output Summary (Last 24 hours) at 10/18/2021 0848  Last data filed at 10/18/2021 0553  Gross per 24 hour   Intake 2651 ml   Output 1850 ml   Net 801 ml     I/O last 3 completed shifts: In: 2651 [P.O.:1480; I.V.:1171]  Out: 1850 [Urine:1850]    Safety Concerns: At Risk for Falls    Impairments/Disabilities:      Mimi Basia [facility] Impairments/Disabilities:[id number]:::0}    Nutrition Therapy:  Current Nutrition Therapy:   - Oral Diet:  Carb Control 4 carbs/meal (1800kcals/day)    Routes of Feeding: Oral  Liquids: No Restrictions  Daily Fluid Restriction: no  Last Modified Barium Swallow with Video (Video Swallowing Test): not done    Treatments at the Time of Hospital Discharge:   Respiratory Treatments: ***  Oxygen Therapy:  is not on home oxygen therapy. Ventilator:    - No ventilator support    Rehab Therapies: Physical Therapy and Occupational Therapy  Weight Bearing Status/Restrictions: No weight bearing restirctions  Other Medical Equipment (for information only, NOT a DME order):  walker  Other Treatments: Skilled nursing assessment  Med teaching and compliance  HHA    Patient's personal belongings (please select all that are sent with patient):  None    RN SIGNATURE:  Electronically signed by Iker Cooley RN on 10/20/21 at 12:28 PM EDT    CASE MANAGEMENT/SOCIAL WORK SECTION    Inpatient Status Date: ***    Readmission Risk Assessment Score:  Readmission Risk              Risk of Unplanned Readmission:  25           Discharging to Facility/ Agency   Name: Name: [facility]  Address: [address] [place], [place] [address]   Phone: [phone]  · Fax: [phone]  · Address:  · Phone:  · Fax:      Case Manager/ signature: Electronically signed by Hector Ghosh RN on 10/18/21 at 10:44 AM EDT    PHYSICIAN SECTION    Prognosis: Fair    Condition at Discharge: Stable    Rehab Potential (if transferring to Rehab): Fair    Recommended Labs or Other Treatments After Discharge: ***    Physician Certification: I certify the above information and transfer of Madie Nathan  is necessary for the continuing treatment of the diagnosis listed and that he requires [address] for greater 30 days.      Update Admission H&P: No change in H&P    PHYSICIAN SIGNATURE:  Electronically signed by Genet Zacarias DO on 10/18/21 at 8:48 AM EDT

## 2021-10-18 NOTE — PLAN OF CARE
Problem: Skin Integrity:  Goal: Will show no infection signs and symptoms  Description: Will show no infection signs and symptoms  10/18/2021 1106 by Amado Sarkar RN  Outcome: Ongoing  Note: Ongoing     Problem: Falls - Risk of:  Goal: Will remain free from falls  Description: Will remain free from falls  10/18/2021 1106 by Amado Sarkar RN  Outcome: Ongoing  Note: Ongoing     Problem: Pain:  Goal: Control of chronic pain  Description: Control of chronic pain  10/18/2021 1106 by Amado Sarkar RN  Outcome: Ongoing  Note: Ongoing

## 2021-10-18 NOTE — CARE COORDINATION
Social Work-[facility] Gregorio harding did not approve patient for admission. [facility] is reviewing patient.  Bronson Acuna

## 2021-10-18 NOTE — PROGRESS NOTES
[facility]  Office: [address], DO, Patrice Umanacharleen, DO, Pat Bowlingmonica, DO, Joey Hicks Blood, DO, Randi Doherty MD, Araseli Stanley MD, Mabel Alaniz MD, Leonidas Khan MD, Jacqui Gay MD, Ean Arreola MD, Lady Lauren MD, John Rubalcava MD, Mary Simons, DO, Lexy Gan, DO, Isaiah Jaramillo MD,  Artur Garcia DO, Jovan Collier MD, Tyler Thorpe MD, Neida Johnson MD, Rosio Villavicencio MD, Bill Ward MD, Nabil Morel MD, Guillermo Jones, [facility], [facility], [facility], Presley Santana, [facility], Butch Lomax, CNS, Africa Skinner, CNP, Zunilda Sánchez, CNP, Janell Jarquin, CNP, Sagrario Butler, CNP, Chris Hughes, CNP, Tawanda Hummel, CNP, Lu Galvez PA-C, Zaki England, [facility], Herb Fong, CNP, Michelle Hensley, CNP, Maryanne Haro, CNP, Hong Graham, CNP, Donna Guthrie, CNP, Herman Myles, [facility], [facility]    Progress Note    10/18/2021    8:44 AM    Name:   Rogelio Fulton  MRN:     [id number]     Acct:      [de-identified]   Room:   2021/2021-01   Day:  4  Admit Date:  10/14/2021  3:47 PM    PCP:   Magali Palma MD  Code Status:  Full Code    Subjective:     C/C:   Chief Complaint   Patient presents with    Other     Blood in [address] [place] [address] causing belly pain     Interval History Status: improved. Patient is resting company denies any complaints of chest pain, shortness of breath, nausea vomiting, fevers or chills. Brief History: This is a 71-year-old male with chronic indwelling Clemons presents with hematuria and lower abdominal pain.  He is found evidence of urinary tract infection is admitted for IV antibiotics and further evaluation.     Review of Systems:     Constitutional:  negative for chills, fevers, sweats  Respiratory:  negative for cough, dyspnea on exertion, shortness of breath, wheezing  Cardiovascular:  negative for chest pain, chest pressure/discomfort, lower extremity edema, palpitations  Gastrointestinal:  negative for abdominal pain, constipation, diarrhea, nausea, vomiting  Neurological:  negative for dizziness, headache    Medications: Allergies:     Allergies   Allergen Reactions    No Known Allergies        Current Meds:   Scheduled Meds:    ciprofloxacin  500 mg Oral 2 times per day    enoxaparin  40 mg SubCUTAneous BID    aspirin  81 mg Oral Daily    DULoxetine  60 mg Oral QAM    nadolol  40 mg Oral Daily    pregabalin  150 mg Oral BID    morphine  4 mg IntraMUSCular Once    tamsulosin  0.4 mg Oral Daily    oxybutynin  5 mg Oral BID    levothyroxine  175 mcg Oral Daily    insulin glargine  30 Units SubCUTAneous BID    venlafaxine  150 mg Oral Daily    QUEtiapine  50 mg Oral Nightly    sodium chloride flush  5-40 mL IntraVENous 2 times per day    insulin lispro  0-12 Units SubCUTAneous TID WC    insulin lispro  0-6 Units SubCUTAneous Nightly     Continuous Infusions:    sodium chloride 50 mL/hr at 10/17/21 0903    sodium chloride      dextrose       PRN Meds: magnesium sulfate, potassium chloride **OR** potassium alternative oral replacement **OR** potassium chloride, albuterol sulfate HFA, albuterol sulfate HFA, clonazePAM, diphenoxylate-atropine, oxyCODONE-acetaminophen, sodium chloride flush, sodium chloride, acetaminophen **OR** acetaminophen, glucose, dextrose, glucagon (rDNA), dextrose    Data:     Past Medical History:   has a past medical history of Anxiety and depression, Back pain, chronic, BPH (benign prostatic hyperplasia), CHF (congestive heart failure) ([facility] Cirrhosis ([facility] Diabetes mellitus ([facility] GERD (gastroesophageal reflux disease), H/O cardiac catheterization, Hepatitis, Hiatal hernia, History of alcohol abuse, Hyperlipidemia, Hypertension, IBS (irritable bowel syndrome), Morbid obesity ([facility] Neuropathy, On home oxygen therapy, Pancreatitis, Primary osteoarthritis of right knee, Sleep apnea, Thyroid disease, and Urinary bladder neurogenic dysfunction. Social History:   reports that he has never smoked. He has never used smokeless tobacco. He reports that he does not drink alcohol and does not use drugs. Family History:   Family History   Adopted: Yes       Vitals:  BP (!) 151/59   Pulse 64   Temp 97.7 °F (36.5 °C) (Oral)   Resp 18   Ht 5' 10\" (1.778 m)   Wt (!) 400 lb (181.4 kg)   SpO2 94%   BMI 57.39 kg/m²   Temp (24hrs), Av.8 °F (36.6 °C), Min:97.6 °F (36.4 °C), Max:98.1 °F (36.7 °C)    Recent Labs     10/17/21  0812 10/17/21  1215 10/17/21  1615 10/17/21  2051   POCGLU 103 158* 169* 143*       I/O (24Hr): Intake/Output Summary (Last 24 hours) at 10/18/2021 0844  Last data filed at 10/18/2021 0553  Gross per 24 hour   Intake 2651 ml   Output 1850 ml   Net 801 ml       Labs:  Hematology:  Recent Labs     10/17/21  0333 10/17/21  0333 10/17/21  1503 10/17/21  2315 10/18/21  0711   WBC 7.8  --   --   --   --    RBC 3.15*  --   --   --   --    HGB 7.7*   < > 8.0* 8.4* 8.2*   HCT 27.9*   < > 28.6* 29.5* 29.3*   MCV 88.6  --   --   --   --    MCH 24.4*  --   --   --   --    MCHC 27.6*  --   --   --   --    RDW 15.3*  --   --   --   --    PLT 88*  --   --   --   --    MPV 11.6  --   --   --   --     < > = values in this interval not displayed.      Chemistry:  Recent Labs     10/16/21  1509 10/17/21  0333    142   K 3.8 3.8    101   CO2 35* 35*   GLUCOSE 183* 97   BUN 26* 22*   CREATININE 1.16 0.95   ANIONGAP 5* 6*   LABGLOM >60 >60   GFRAA >60 >60   CALCIUM 8.1* 8.3*     Recent Labs     10/16/21  1659 10/16/21  1920 10/17/21  0333 10/17/21  0812 10/17/21  1215 10/17/21  1615 10/17/21  2051   PROT  --   --  6.4  --   --   --   --    LABALBU  --   --  3.1*  --   --   --   --    AST  --   --  17  --   --   --   --    ALT  --   --  16  --   --   --   --    ALKPHOS  --   --  197*  --   --   --   --    BILITOT  --   --  0.51  --   --   --   --    POCGLU 178* 188*  --  103 158* 169* 143*     ABG:  Lab Results Component Value Date    POCPH 7.484 10/15/2021    PHART 7.330 06/18/2014    POCPCO2 41.0 10/15/2021    RGR0ITN 68.0 06/18/2014    POCPO2 128.4 10/15/2021    PO2ART 84.0 06/18/2014    POCHCO3 30.9 10/15/2021    AKH2JMD 34.9 06/18/2014    NBEA NOT REPORTED 10/15/2021    PBEA 7 10/15/2021    HRO8CVA NOT REPORTED 10/15/2021    VSOQ0MFU 99 10/15/2021    X8AFRXQY 96.5 06/18/2014    FIO2 3.5 10/15/2021     Lab Results   Component Value Date/Time    SPECIAL LH 8ML 10/15/2021 08:04 AM     Lab Results   Component Value Date/Time    CULTURE NO GROWTH 3 DAYS 10/15/2021 08:04 AM       Radiology:  XR CHEST PORTABLE    Result Date: 10/15/2021  1. Uncomplicated line placement. 2. Rapid development of bilateral airspace opacities favors pulmonary edema.      XR CHEST PORTABLE    Result Date: 10/14/2021  No acute cardiopulmonary process       Physical Examination:        General appearance:  alert, cooperative and no distress  Mental Status:  oriented to person, place and time and normal affect  Lungs:  clear to auscultation bilaterally, normal effort  Heart:  regular rate and rhythm, no murmur  Abdomen:  soft, nontender, nondistended, normal bowel sounds, no masses, hepatomegaly, splenomegaly  Extremities:  no edema, redness, tenderness in the calves  Skin:  no gross lesions, rashes, induration    Assessment:        Hospital Problems         Last Modified POA    * (Principal) Klebsiella sepsis ([facility] 36/04/8393 Yes    Alcoholic cirrhosis of liver ([facility] sober since 2013 10/14/2021 Yes    Type 2 diabetes mellitus with diabetic neuropathy, with long-term current use of insulin ([facility] 10/14/2021 Yes    Essential hypertension, currently hypotensive 10/15/2021 Yes    FABI (obstructive sleep apnea) 10/14/2021 Yes    Acquired hypothyroidism 10/14/2021 Yes    Anemia 10/14/2021 Yes    Gastroesophageal reflux disease without esophagitis 10/14/2021 Yes    Morbid obesity with BMI of 50.0-59.9, adult ([facility] 10/14/2021 Yes    Anxiety and depression 10/14/2021 Yes    BPH (benign prostatic hyperplasia) 69/36/6464 Yes    Diastolic congestive heart failure ([facility] 10/14/2021 Yes    Hypotension 10/14/2021 Yes    Septic shock ([facility] 10/15/2021 Yes          Plan:        Continue Cipro as ordered  Wound care  Insulin scale for glycemic control  Continue present antihypertensives  Monitor urine output  Continue home cardiac medications  PT and OT  GI DVT prophylaxis  Discharge planning, home with home care    Joaquin Saravia DO  10/18/2021  8:44 AM

## 2021-10-18 NOTE — CARE COORDINATION
Social Work-[facility] [place] and [address] [place] [address]. They are both reviewing patient. Parvin Kessler

## 2021-10-18 NOTE — CARE COORDINATION
Social Work-Met with patient to discuss that [facility] will not have bed. Discussed other options. He stated that he would consider [facility]. Sent referral. Phone call to son, Alice Buckner. Updated Alice Buckner. He would also like referral sent to [facility] to Gregorio harding.  Sent referral. Sammi Cabrera

## 2021-10-18 NOTE — CARE COORDINATION
Spoke to patient about [facility] discharging pt and he was not aware of that. Discussed options of SNFagain and pt is willing to go short term.   Has been at [address] [place] [address] and [facility] in the past.  Agreeable to go to either facility and [facility]266 informed and will send referral.

## 2021-10-18 NOTE — PLAN OF CARE
Problem: Skin Integrity:  Goal: Will show no infection signs and symptoms  Description: Will show no infection signs and symptoms  Outcome: Ongoing  Note: Checked for incontinence every 2 hours and prn. Pericare as needed. Assisted to reposition off back frequently. Heels off bed with pillows. Goal: Absence of new skin breakdown  Description: Absence of new skin breakdown  Outcome: Ongoing     Problem: Falls - Risk of:  Goal: Will remain free from falls  Description: Will remain free from falls  Outcome: Ongoing  Note: Siderails up x 2  Hourly rounding  Call light in reach  Instructed to call for assist before attempting out of bed. Remains free from falls and accidental injury at this time   Floor free from obstacles  Bed is locked and in lowest position  Adequate lighting provided  Bed alarm on, [place] star and Stay with Me signs posted      Goal: Absence of physical injury  Description: Absence of physical injury  Outcome: Ongoing     Problem: Pain:  Goal: Pain level will decrease  Description: Pain level will decrease  Outcome: Ongoing  Goal: Control of acute pain  Description: Control of acute pain  Outcome: Ongoing  Note: Pain level assessment complete.    Patient educated on pain scale and control interventions  PRN pain medication given per patient request  Patient instructed to call out with new onset of pain or unrelieved pain    Goal: Control of chronic pain  Description: Control of chronic pain  Outcome: Ongoing

## 2021-10-19 LAB
ABSOLUTE EOS #: 0.08 K/UL (ref 0–0.4)
ABSOLUTE IMMATURE GRANULOCYTE: 0.23 K/UL (ref 0–0.3)
ABSOLUTE LYMPH #: 0.62 K/UL (ref 1–4.8)
ABSOLUTE MONO #: 0.86 K/UL (ref 0.2–0.8)
ANION GAP SERPL CALCULATED.3IONS-SCNC: 10 MMOL/L (ref 9–17)
BASOPHILS # BLD: 1 %
BASOPHILS ABSOLUTE: 0.08 K/UL (ref 0–0.2)
BUN BLDV-MCNC: 17 MG/DL (ref 6–20)
BUN/CREAT BLD: 21 (ref 9–20)
CALCIUM SERPL-MCNC: 8.9 MG/DL (ref 8.6–10.4)
CHLORIDE BLD-SCNC: 103 MMOL/L (ref 98–107)
CO2: 30 MMOL/L (ref 20–31)
CREAT SERPL-MCNC: 0.8 MG/DL (ref 0.7–1.2)
DIFFERENTIAL TYPE: ABNORMAL
EOSINOPHILS RELATIVE PERCENT: 1 % (ref 1–4)
GFR AFRICAN AMERICAN: >60 ML/MIN
GFR NON-AFRICAN AMERICAN: >60 ML/MIN
GFR SERPL CREATININE-BSD FRML MDRD: ABNORMAL ML/MIN/{1.73_M2}
GFR SERPL CREATININE-BSD FRML MDRD: ABNORMAL ML/MIN/{1.73_M2}
GLUCOSE BLD-MCNC: 104 MG/DL (ref 75–110)
GLUCOSE BLD-MCNC: 118 MG/DL (ref 70–99)
GLUCOSE BLD-MCNC: 125 MG/DL (ref 75–110)
GLUCOSE BLD-MCNC: 143 MG/DL (ref 75–110)
GLUCOSE BLD-MCNC: 88 MG/DL (ref 75–110)
HCT VFR BLD CALC: 31.3 % (ref 40.7–50.3)
HCT VFR BLD CALC: 33.7 % (ref 40.7–50.3)
HEMOGLOBIN: 8.7 G/DL (ref 13–17)
HEMOGLOBIN: 9.5 G/DL (ref 13–17)
IMMATURE GRANULOCYTES: 3 %
LYMPHOCYTES # BLD: 8 % (ref 24–44)
MAGNESIUM: 2.1 MG/DL (ref 1.6–2.6)
MCH RBC QN AUTO: 25 PG (ref 25.2–33.5)
MCHC RBC AUTO-ENTMCNC: 28.2 G/DL (ref 28.4–34.8)
MCV RBC AUTO: 88.7 FL (ref 82.6–102.9)
MONOCYTES # BLD: 11 % (ref 1–7)
MORPHOLOGY: ABNORMAL
NRBC AUTOMATED: 0 PER 100 WBC
PDW BLD-RTO: 15 % (ref 11.8–14.4)
PLATELET # BLD: 91 K/UL (ref 138–453)
PLATELET ESTIMATE: ABNORMAL
PMV BLD AUTO: 10.3 FL (ref 8.1–13.5)
POTASSIUM SERPL-SCNC: 4.1 MMOL/L (ref 3.7–5.3)
RBC # BLD: 3.8 M/UL (ref 4.21–5.77)
RBC # BLD: ABNORMAL 10*6/UL
SEG NEUTROPHILS: 76 % (ref 36–66)
SEGMENTED NEUTROPHILS ABSOLUTE COUNT: 5.93 K/UL (ref 1.8–7.7)
SODIUM BLD-SCNC: 143 MMOL/L (ref 135–144)
WBC # BLD: 7.8 K/UL (ref 3.5–11.3)
WBC # BLD: ABNORMAL 10*3/UL

## 2021-10-19 PROCEDURE — 6360000002 HC RX W HCPCS: Performed by: INTERNAL MEDICINE

## 2021-10-19 PROCEDURE — 6370000000 HC RX 637 (ALT 250 FOR IP): Performed by: INTERNAL MEDICINE

## 2021-10-19 PROCEDURE — 1200000000 HC SEMI PRIVATE

## 2021-10-19 PROCEDURE — 85014 HEMATOCRIT: CPT

## 2021-10-19 PROCEDURE — 6370000000 HC RX 637 (ALT 250 FOR IP): Performed by: NURSE PRACTITIONER

## 2021-10-19 PROCEDURE — 82947 ASSAY GLUCOSE BLOOD QUANT: CPT

## 2021-10-19 PROCEDURE — 99232 SBSQ HOSP IP/OBS MODERATE 35: CPT | Performed by: NURSE PRACTITIONER

## 2021-10-19 PROCEDURE — 94760 N-INVAS EAR/PLS OXIMETRY 1: CPT

## 2021-10-19 PROCEDURE — 94640 AIRWAY INHALATION TREATMENT: CPT

## 2021-10-19 PROCEDURE — 97535 SELF CARE MNGMENT TRAINING: CPT

## 2021-10-19 PROCEDURE — 97530 THERAPEUTIC ACTIVITIES: CPT

## 2021-10-19 PROCEDURE — 83735 ASSAY OF MAGNESIUM: CPT

## 2021-10-19 PROCEDURE — 2580000003 HC RX 258: Performed by: INTERNAL MEDICINE

## 2021-10-19 PROCEDURE — APPSS45 APP SPLIT SHARED TIME 31-45 MINUTES: Performed by: NURSE PRACTITIONER

## 2021-10-19 PROCEDURE — 2700000000 HC OXYGEN THERAPY PER DAY

## 2021-10-19 PROCEDURE — 85025 COMPLETE CBC W/AUTO DIFF WBC: CPT

## 2021-10-19 PROCEDURE — 85018 HEMOGLOBIN: CPT

## 2021-10-19 PROCEDURE — 80048 BASIC METABOLIC PNL TOTAL CA: CPT

## 2021-10-19 PROCEDURE — 36415 COLL VENOUS BLD VENIPUNCTURE: CPT

## 2021-10-19 PROCEDURE — 97164 PT RE-EVAL EST PLAN CARE: CPT

## 2021-10-19 PROCEDURE — 94761 N-INVAS EAR/PLS OXIMETRY MLT: CPT

## 2021-10-19 RX ORDER — GLIPIZIDE 10 MG/1
10 TABLET ORAL
Status: DISCONTINUED | OUTPATIENT
Start: 2021-10-19 | End: 2021-10-20 | Stop reason: HOSPADM

## 2021-10-19 RX ORDER — BUMETANIDE 1 MG/1
2 TABLET ORAL 2 TIMES DAILY
Status: DISCONTINUED | OUTPATIENT
Start: 2021-10-19 | End: 2021-10-20 | Stop reason: HOSPADM

## 2021-10-19 RX ORDER — ALOGLIPTIN 25 MG/1
25 TABLET, FILM COATED ORAL DAILY
Status: DISCONTINUED | OUTPATIENT
Start: 2021-10-19 | End: 2021-10-20 | Stop reason: HOSPADM

## 2021-10-19 RX ORDER — CALCIUM CARBONATE 200(500)MG
1000 TABLET,CHEWABLE ORAL 3 TIMES DAILY PRN
Status: DISCONTINUED | OUTPATIENT
Start: 2021-10-19 | End: 2021-10-20 | Stop reason: HOSPADM

## 2021-10-19 RX ADMIN — LEVOTHYROXINE SODIUM 175 MCG: 0.17 TABLET ORAL at 05:59

## 2021-10-19 RX ADMIN — METFORMIN HYDROCHLORIDE 500 MG: 500 TABLET ORAL at 17:17

## 2021-10-19 RX ADMIN — CLONAZEPAM 1 MG: 0.5 TABLET ORAL at 20:45

## 2021-10-19 RX ADMIN — ALBUTEROL SULFATE 2 PUFF: 90 AEROSOL, METERED RESPIRATORY (INHALATION) at 21:59

## 2021-10-19 RX ADMIN — CLONAZEPAM 1 MG: 0.5 TABLET ORAL at 10:46

## 2021-10-19 RX ADMIN — GLIPIZIDE 10 MG: 10 TABLET ORAL at 15:30

## 2021-10-19 RX ADMIN — CIPROFLOXACIN 500 MG: 500 TABLET ORAL at 09:31

## 2021-10-19 RX ADMIN — SODIUM CHLORIDE, PRESERVATIVE FREE 10 ML: 5 INJECTION INTRAVENOUS at 20:45

## 2021-10-19 RX ADMIN — OXYBUTYNIN CHLORIDE 5 MG: 5 TABLET ORAL at 20:45

## 2021-10-19 RX ADMIN — PREGABALIN 150 MG: 100 CAPSULE ORAL at 09:31

## 2021-10-19 RX ADMIN — SODIUM CHLORIDE, PRESERVATIVE FREE 10 ML: 5 INJECTION INTRAVENOUS at 09:32

## 2021-10-19 RX ADMIN — ENOXAPARIN SODIUM 40 MG: 40 INJECTION SUBCUTANEOUS at 09:30

## 2021-10-19 RX ADMIN — BUMETANIDE 2 MG: 1 TABLET ORAL at 17:16

## 2021-10-19 RX ADMIN — VENLAFAXINE HYDROCHLORIDE 150 MG: 75 CAPSULE, EXTENDED RELEASE ORAL at 09:31

## 2021-10-19 RX ADMIN — TAMSULOSIN HYDROCHLORIDE 0.4 MG: 0.4 CAPSULE ORAL at 09:31

## 2021-10-19 RX ADMIN — ENOXAPARIN SODIUM 40 MG: 40 INJECTION SUBCUTANEOUS at 20:45

## 2021-10-19 RX ADMIN — INSULIN LISPRO 2 UNITS: 100 INJECTION, SOLUTION INTRAVENOUS; SUBCUTANEOUS at 17:17

## 2021-10-19 RX ADMIN — Medication 1000 MG: at 23:37

## 2021-10-19 RX ADMIN — ALOGLIPTIN 25 MG: 25 TABLET, FILM COATED ORAL at 15:30

## 2021-10-19 RX ADMIN — OXYBUTYNIN CHLORIDE 5 MG: 5 TABLET ORAL at 09:31

## 2021-10-19 RX ADMIN — OXYCODONE AND ACETAMINOPHEN 1 TABLET: 5; 325 TABLET ORAL at 18:51

## 2021-10-19 RX ADMIN — CIPROFLOXACIN 500 MG: 500 TABLET ORAL at 20:45

## 2021-10-19 RX ADMIN — PREGABALIN 150 MG: 100 CAPSULE ORAL at 20:45

## 2021-10-19 RX ADMIN — QUETIAPINE FUMARATE 50 MG: 50 TABLET, EXTENDED RELEASE ORAL at 20:45

## 2021-10-19 RX ADMIN — DULOXETINE HYDROCHLORIDE 60 MG: 60 CAPSULE, DELAYED RELEASE ORAL at 09:31

## 2021-10-19 RX ADMIN — OXYCODONE AND ACETAMINOPHEN 1 TABLET: 5; 325 TABLET ORAL at 05:59

## 2021-10-19 RX ADMIN — OXYCODONE AND ACETAMINOPHEN 1 TABLET: 5; 325 TABLET ORAL at 12:35

## 2021-10-19 RX ADMIN — ASPIRIN 81 MG: 81 TABLET, COATED ORAL at 09:31

## 2021-10-19 ASSESSMENT — PAIN DESCRIPTION - DESCRIPTORS
DESCRIPTORS: DISCOMFORT
DESCRIPTORS: BURNING

## 2021-10-19 ASSESSMENT — PAIN DESCRIPTION - LOCATION
LOCATION: GROIN
LOCATION: GENERALIZED
LOCATION: GENERALIZED

## 2021-10-19 ASSESSMENT — PAIN SCALES - GENERAL
PAINLEVEL_OUTOF10: 8
PAINLEVEL_OUTOF10: 8
PAINLEVEL_OUTOF10: 7
PAINLEVEL_OUTOF10: 10

## 2021-10-19 ASSESSMENT — PAIN DESCRIPTION - ONSET: ONSET: ON-GOING

## 2021-10-19 ASSESSMENT — PAIN DESCRIPTION - PAIN TYPE
TYPE: ACUTE PAIN
TYPE: CHRONIC PAIN

## 2021-10-19 ASSESSMENT — PAIN - FUNCTIONAL ASSESSMENT: PAIN_FUNCTIONAL_ASSESSMENT: PREVENTS OR INTERFERES SOME ACTIVE ACTIVITIES AND ADLS

## 2021-10-19 ASSESSMENT — PAIN DESCRIPTION - FREQUENCY: FREQUENCY: CONTINUOUS

## 2021-10-19 NOTE — PROGRESS NOTES
Physical Therapy    Facility/Department: [facility] MED SURG  Initial Assessment    NAME: Shree Brandt  : 1965  MRN: [id number]    Date of Service: 10/19/2021    Discharge Recommendations:  Patient would benefit from continued therapy after discharge        Assessment   Body structures, Functions, Activity limitations: Decreased functional mobility ; Decreased strength;Decreased endurance;Decreased balance  Assessment: Pt hindered by poor activity tolerance  Specific instructions for Next Treatment: Wide RW for gait and transfers  Prognosis: Good  Decision Making: High Complexity  PT Education: PT Role;Plan of Care;General Safety  Activity Tolerance  Activity Tolerance: Patient limited by endurance; Patient limited by fatigue  Activity Tolerance: Pt requires much time to motivate himself to participate but is cooperative       Patient Diagnosis(es): The encounter diagnosis was Urinary tract infection without hematuria, site unspecified. has a past medical history of Anxiety and depression, Back pain, chronic, BPH (benign prostatic hyperplasia), CHF (congestive heart failure) ([address] Cirrhosis ([address] Diabetes mellitus ([address] GERD (gastroesophageal reflux disease), H/O cardiac catheterization, Hepatitis, Hiatal hernia, History of alcohol abuse, Hyperlipidemia, Hypertension, IBS (irritable bowel syndrome), Morbid obesity ([address] Neuropathy, On home oxygen therapy, Pancreatitis, Primary osteoarthritis of right knee, Sleep apnea, Thyroid disease, and Urinary bladder neurogenic dysfunction. has a past surgical history that includes Colonoscopy; Abdomen surgery; hernia repair; Endoscopy, colon, diagnostic; Upper gastrointestinal endoscopy (2017); pr egd transoral biopsy single/multiple (N/A, 2017); pr deep dissec foot infec,1 bursa (Bilateral, 2017); Cystoscopy (2018); pr cystourethroscopy (N/A, 2018); Incisional hernia repair (2018); ventral hernia repair (N/A, 2018);  Cystocopy (02/20/2019); Cystoscopy (N/A, 2/20/2019); Upper gastrointestinal endoscopy (N/A, 3/14/2019); and Cystoscopy (N/A, 8/23/2019). Restrictions  Restrictions/Precautions  Restrictions/Precautions: General Precautions, Contact Precautions, Fall Risk, Up as Tolerated  Required Braces or Orthoses?: No  Position Activity Restriction  Other position/activity restrictions: 2L O2, telemetry, Clemons Cath, RUE IV, elevate heels off bed, contact iso, up with assist  Vision/Hearing        Subjective  General  Patient assessed for rehabilitation services?: Yes  Response To Previous Treatment: Patient with no complaints from previous session.   Family / Caregiver Present: No  General Comment  Comments: OK for PT per Casey Carmen RN  Subjective  Subjective: Pt initially reticent to participate but did acquiesce and agree to participate  Pain Screening  Patient Currently in Pain: Yes  Pain Assessment  Pain Assessment: 0-10  Pain Level: 8  Pain Location: Generalized  Vital Signs  Patient Currently in Pain: Yes       Orientation  Orientation  Overall Orientation Status: Within Normal Limits  Social/Functional History  Social/Functional History  Lives With: Son (30 year old)  Type of Home: House  Home Layout: One level  Home Access: Ramped entrance  Bathroom Shower/Tub: Walk-in shower  Bathroom Toilet: Standard  Bathroom Equipment: Grab bars in shower, Shower chair  Home Equipment: Wheelchair-manual, Rolling walker, Oxygen, Lift chair, Alert Button (3L O2 AAT)  Receives Help From: Home health (Home health aide 2x/day 7x/wk, home nursing and home PT just finished)  ADL Assistance: Needs assistance (Aide assist with sponge bathes and dressing)  Homemaking Assistance: Needs assistance (Son and Aids complete all house hold chores)  Homemaking Responsibilities: No  Ambulation Assistance: Needs assistance (uses RW)  Transfer Assistance: Needs assistance (uses lift chair AAT)  Active : No  Patient's  Info: takes transportation belt  Restraints  Initially in place: No    G-Code       OutComes Score                                                  AM-PAC Score  AM-PAC Inpatient Mobility Raw Score : 13 (10/19/21 1302)  AM-PAC Inpatient T-Scale Score : 36.74 (10/19/21 1302)  Mobility Inpatient CMS 0-100% Score: 64.91 (10/19/21 1302)  Mobility Inpatient CMS G-Code Modifier : CL (10/19/21 1302)          Goals  Short term goals  Time Frame for Short term goals: 12 visits  Short term goal 1: Pt to demonstrate independent bed mobility  Short term goal 2: Pt to perform STS transfers w/ RW demonstrating proper hand placement MaxA  Short term goal 3: Pt to ambulate 25' x 1 w/ RW CGA  Short term goal 4: Pt to actively participate in at least 30 minutes of physical therapy for ther ex, ther act, balance, gait, and transfers  Short term goal 5: Good standing balance/ B LE strength increased 1/2 to 1 grade  Patient Goals   Patient goals : Go home       Therapy Time   Individual Concurrent Group Co-treatment   Time In [phone]         Time Out 1014         Minutes 28           Co-treatment with OT warranted secondary to decreased safety and independence requiring 2 skilled therapy professionals to address individual discipline's goals.          Yvonne Ram, PT

## 2021-10-19 NOTE — PLAN OF CARE
Problem: Skin Integrity:  Goal: Will show no infection signs and symptoms  Description: Will show no infection signs and symptoms  10/19/2021 1255 by Filiberto Roth RN  Outcome: Ongoing  Note: Ongoing     Problem: Falls - Risk of:  Goal: Will remain free from falls  Description: Will remain free from falls  10/19/2021 1255 by Filiberto Roth, RN  Outcome: Ongoing  Note: Ongoing     Problem: Pain:  Goal: Pain level will decrease  Description: Pain level will decrease  10/19/2021 1255 by Filiberto Roth, RN  Outcome: Ongoing  Note: Ongoing

## 2021-10-19 NOTE — CARE COORDINATION
Call from [facility] with [facility] ([phone]) and if pt returns home they can resume HHA services but not nursing due to behavioral issues.

## 2021-10-19 NOTE — FLOWSHEET NOTE
Patient sleeping. Left \"Prayer for Healing\" card and offered silent prayer.         10/19/21 0955   Encounter Summary   Services provided to: Patient   Referral/Consult From: [facility] Family members   Continue Visiting   (Discharge today)   Complexity of Encounter Low   Length of Encounter 15 minutes   Routine   Type Follow up   Assessment Sleeping   Intervention Sustaining presence/ Ministry of presence;Provided reading materials/devotional materials   Outcome Did not respond

## 2021-10-19 NOTE — PROGRESS NOTES
Patient alerted [facility] using his [facility] necklace. 911 called the unit to inform us that patient called stating he was on the floor next to the bed.  RN assessed patient and informed them that he is safe in the hospital.

## 2021-10-19 NOTE — CARE COORDINATION
10/19/21 nHpredict tool uploaded to chart and can be viewed in the media tab, recommending SNF for greatest gains.  GUSTAVO LUCAS      1965    PLOF: lives in one level home w ramped entrance, w his son. Has WC, RW, O2, lift chair, alert button, has W[employer] aides 2 x day, 7 days/wk. Needs assist w mobility/ADLs. CLOF: MAX x 2 for bed mobility, MIN for grooming, MAX for UE bathing/dressing, DEP for LB bathing/dressing/toileting, A&O, fair cognition, glasses, BMI 57     nH Predict recommends: SNF @ 17 days    DC Plan: Per CC note, plan is for short term SNF              Wendy Arellano.  Mingo Martell MSN, [address] Coordinator     Cell: [phone]

## 2021-10-19 NOTE — CARE COORDINATION
Social Work-Phone call to son to inform him that [facility] approved patient for admission. He stated that he would like  to see if [facility] would approve him for admission. Faxed to [facility]. They approved patient for admission and can accept him after they receive the bariatric bed.  They will also need a COVID test. Arranged [facility] to transport at [facility].

## 2021-10-19 NOTE — PROGRESS NOTES
Occupational Therapy  Facility/Department: [facility] MED SURG  Daily Treatment Note  NAME: Angela Padilla  : 1965  MRN: [id number]    Date of Service: 10/19/2021    Discharge Recommendations:  Patient would benefit from continued therapy after discharge   Pt currently functioning below baseline. Would suggest additional therapy at time of discharge to maximize long term outcomes and prevent re-admission. Please refer to AM-PAC score for current level of function. Assessment   Performance deficits / Impairments: Decreased functional mobility ; Decreased ADL status; Decreased safe awareness;Decreased cognition;Decreased strength;Decreased endurance;Decreased sensation;Decreased fine motor control;Decreased high-level IADLs;Decreased posture;Decreased balance;Decreased coordination;Decreased vision/visual deficit  Assessment: Pt slowly progressing towards goals but is still limited by overall fatigue, weakness and shortness of breath. Pt would benefit from continued skilled OT services to Maximize I and safety during functional tasks to reduce caregiver burden as able. Prognosis: Fair  REQUIRES OT FOLLOW UP: Yes  Activity Tolerance  Activity Tolerance: Patient limited by fatigue  Activity Tolerance: P+  Safety Devices  Safety Devices in place: Yes  Type of devices: All fall risk precautions in place; Left in chair;Call light within reach;Nurse notified; Chair alarm in place;Gait belt;Patient at risk for falls         Patient Diagnosis(es): The encounter diagnosis was Urinary tract infection without hematuria, site unspecified.       has a past medical history of Anxiety and depression, Back pain, chronic, BPH (benign prostatic hyperplasia), CHF (congestive heart failure) ([address] Cirrhosis ([address] Diabetes mellitus ([address] GERD (gastroesophageal reflux disease), H/O cardiac catheterization, Hepatitis, Hiatal hernia, History of alcohol abuse, Hyperlipidemia, Hypertension, IBS (irritable bowel syndrome), [facility] Neuropathy, On home oxygen therapy, Pancreatitis, Primary osteoarthritis of right knee, Sleep apnea, Thyroid disease, and Urinary bladder neurogenic dysfunction. has a past surgical history that includes Colonoscopy; Abdomen surgery; hernia repair; Endoscopy, colon, diagnostic; Upper gastrointestinal endoscopy (07/19/2017); pr egd transoral biopsy single/multiple (N/A, 7/19/2017); pr deep dissec foot infec,1 bursa (Bilateral, 8/22/2017); Cystoscopy (01/24/2018); pr cystourethroscopy (N/A, 1/24/2018); Incisional hernia repair (05/20/2018); ventral hernia repair (N/A, 5/20/2018); Cystocopy (02/20/2019); Cystoscopy (N/A, 2/20/2019); Upper gastrointestinal endoscopy (N/A, 3/14/2019); and Cystoscopy (N/A, 8/23/2019). Restrictions  Restrictions/Precautions  Restrictions/Precautions: General Precautions, Contact Precautions, Fall Risk, Up as Tolerated  Required Braces or Orthoses?: No  Position Activity Restriction  Other position/activity restrictions: 2L O2, telemetry, Wells Cath, RUE IV, elevate heels off bed, contact iso, up with assist  Subjective   General  Chart Reviewed: Yes  Patient assessed for rehabilitation services?: Yes  Response to previous treatment: Patient with no complaints from previous session  Family / Caregiver Present: No  Subjective  Subjective: Pt in bed upon arrival. Pt reluctant to participate in therapy and needed max encouragement before agreeing to get up tp chair. General Comment  Comments: RN ok'd pt for therapy. Orientation  Orientation  Overall Orientation Status: Within Functional Limits  Objective             Balance  Sitting Balance: Stand by assistance  Standing Balance: Moderate assistance (x2)  Bed mobility  Supine to Sit: Moderate assistance;2 Person assistance  Comment: Verbal/physical cues for use of bed rail, sequencing movements and overall safety. Total assist with wells/O2 mgmt. Transfers  Stand Step Transfers:  Moderate assistance;2 Person assistance  Sit Mod A for ADL transfers and functional mob with AD as needed and Good safety  Short term goal 2: bed mobility to Mod A of 1 with use of bedrail and lift as needed. Short term goal 3: UB ADLs to Min A and LB ADLs to Max A with use of AD/AE as needed. Short term goal 4: increased sitting latrice > 20 min with SBA to assist with participation in ADL routine. Short term goal 5: increased B UE strength by 1/2 grade to assist with functional tasks. Long term goals  Long term goal 1: Pt/caregivers to be I with fall prevention education, EC/WS tech, B UE HEP, pursed lip breathing and pressure relief with use of handouts as needed. Patient Goals   Patient goals : To feel better and go home! Therapy Time   Individual Concurrent Group Co-treatment   Time In       2413   Time Out       80   Minutes       27     Co-treatment with PT warranted secondary to decreased safety and independence requiring 2 skilled therapy professionals to address individual discipline's goals. OT addressing \"preparation for ADL transfer\",\"sitting balance for increased ADL performance\",\"sitting/activity tolerance\",\"functional reaching\",\"environmental safety/scanning\",\"fall prevention\",\"functional mobility for ADL transfers\",\"ability to sequence and follow directions\",\"functional UE strength\".          Corinne Harm, OTA

## 2021-10-19 NOTE — PROGRESS NOTES
[facility]  Office: [address], DO, Manohar Stuart, DO, Omar Petty, DO, Norberto Mckenzie Blood, DO, David Mcdonough MD, Jeanette Buenrostro MD, Linda Monroe MD, Marisol Mcfarlane MD, Andreas Menard MD, Tommie Mac MD, Hoa Iqbal MD, Nisha Jean Baptiste MD, uAgust Moya, DO, Ashlee Brennan, DO, Ovi Rucker MD,  Angelika Westfall, DO, Mart Love MD, Charisse Hardin MD, Sadia Trammell MD, Ha Cosby MD, Latoya Burkett MD, Jared David MD, Tyson Neal, Kyung Lambert, ROSY, Johnny Limon, CNP, Kiarra Luz, CNS, Quyen Sampson, CNP, Mirna Melendez, CNP, Jake Knight, CNP, Dominique Clarke, CNP, Lobo Cunningham, CNP, Olivia Boyce, CNP, Radha Alvarez PA-C, Charmel Osgood, [facility], dAry Ivey, CNP, Jesusita Mendosa, CNP, Kyra Lutz, CNP, Paradise Westfall, CNP, Tiney Schilder, CNP, Marlyne Osgood, CNP, Vasiliy Flores [facility]    Progress Note    10/19/2021    2:11 PM    Name:   Shanti Hardin  MRN:     [id number]     Acct:      [de-identified]   Room:   2021/2021-01  IP Day:  5  Admit Date:  10/14/2021  3:47 PM    PCP:   Derek Tse MD  Code Status:  Full Code    Subjective:     C/C:   Chief Complaint   Patient presents with    Other     Blood in [address] [place] [address] causing belly pain     Interval History Status: not changed. Very little change in condition. Patient continues to receive Cipro for his bacteremia. PT OT is working with patient and recommends SNF. Patient is agreeable. Patient awaiting placement. Medically stable for discharge once arrangements can be made. Brief History: This is a 62-year-old male with chronic indwelling Clemons presents with hematuria and lower abdominal pain.  He is found evidence of urinary tract infection is admitted for IV antibiotics and further evaluation. Subsequently found to have Klebsiella bacteremia that is sensitive for Cipro.   Patient progressed well during his hospital course and will be discharged to skilled nursing facility once bed arrangements can be made. Review of Systems:     Constitutional:  negative for chills, fevers, sweats  Respiratory:  negative for cough, dyspnea on exertion, shortness of breath, wheezing  Cardiovascular:  negative for chest pain, chest pressure/discomfort, lower extremity edema, palpitations  Gastrointestinal:  negative for abdominal pain, constipation, diarrhea, nausea, vomiting  Neurological:  negative for dizziness, headache    Medications: Allergies:     Allergies   Allergen Reactions    No Known Allergies        Current Meds:   Scheduled Meds:    ciprofloxacin  500 mg Oral 2 times per day    enoxaparin  40 mg SubCUTAneous BID    aspirin  81 mg Oral Daily    DULoxetine  60 mg Oral QAM    nadolol  40 mg Oral Daily    pregabalin  150 mg Oral BID    morphine  4 mg IntraMUSCular Once    tamsulosin  0.4 mg Oral Daily    oxybutynin  5 mg Oral BID    levothyroxine  175 mcg Oral Daily    insulin glargine  30 Units SubCUTAneous BID    venlafaxine  150 mg Oral Daily    QUEtiapine  50 mg Oral Nightly    sodium chloride flush  5-40 mL IntraVENous 2 times per day    insulin lispro  0-12 Units SubCUTAneous TID WC    insulin lispro  0-6 Units SubCUTAneous Nightly     Continuous Infusions:    sodium chloride      dextrose       PRN Meds: magnesium sulfate, potassium chloride **OR** potassium alternative oral replacement **OR** potassium chloride, albuterol sulfate HFA, clonazePAM, diphenoxylate-atropine, oxyCODONE-acetaminophen, sodium chloride flush, sodium chloride, acetaminophen **OR** acetaminophen, glucose, dextrose, glucagon (rDNA), dextrose    Data:     Past Medical History:   has a past medical history of Anxiety and depression, Back pain, chronic, BPH (benign prostatic hyperplasia), [facility]) [facility] Cirrhosis ([facility] Diabetes mellitus ([facility] GERD (gastroesophageal reflux disease), H/O cardiac catheterization, Hepatitis, Hiatal hernia, History of alcohol abuse, Hyperlipidemia, Hypertension, IBS (irritable bowel syndrome), Morbid obesity ([address] Neuropathy, On home oxygen therapy, Pancreatitis, Primary osteoarthritis of right knee, Sleep apnea, Thyroid disease, and Urinary bladder neurogenic dysfunction. Social History:   reports that he has never smoked. He has never used smokeless tobacco. He reports that he does not drink alcohol and does not use drugs. Family History:   Family History   Adopted: Yes       Vitals:  BP (!) 148/67   Pulse 59   Temp 98 °F (36.7 °C) (Oral)   Resp 18   Ht 5' 10\" (1.778 m)   Wt (!) 401 lb (181.9 kg)   SpO2 98%   BMI 57.54 kg/m²   Temp (24hrs), Av °F (36.7 °C), Min:97.7 °F (36.5 °C), Max:98.3 °F (36.8 °C)    Recent Labs     10/18/21  1627 10/18/21  2022 10/19/21  0608 10/19/21  1114   POCGLU 141* 143* 104 125*       I/O (24Hr): Intake/Output Summary (Last 24 hours) at 10/19/2021 1411  Last data filed at 10/19/2021 0800  Gross per 24 hour   Intake --   Output 2075 ml   Net -2075 ml       Labs:  Hematology:  Recent Labs     10/17/21  0333 10/17/21  1503 10/18/21  2258 10/19/21  0756 10/19/21  0846   WBC 7.8  --   --   --  7.8   RBC 3.15*  --   --   --  3.80*   HGB 7.7*   < > 8.3* 8.7* 9.5*   HCT 27.9*   < > 28.6* 31.3* 33.7*   MCV 88.6  --   --   --  88.7   MCH 24.4*  --   --   --  25.0*   MCHC 27.6*  --   --   --  28.2*   RDW 15.3*  --   --   --  15.0*   PLT 88*  --   --   --  91*   MPV 11.6  --   --   --  10.3    < > = values in this interval not displayed.      Chemistry:  Recent Labs     10/16/21  1509 10/17/21  0333 10/19/21  0846    142 143   K 3.8 3.8 4.1    101 103   CO2 35* 35* 30   GLUCOSE 183* 97 118*   BUN 26* 22* 17   CREATININE 1.16 0.95 0.80   MG  --   --  2.1   ANIONGAP 5* 6* 10   LABGLOM >60 >60 >60   GFRAA >60 >60 >60   CALCIUM 8.1* 8.3* 8.9     Recent Labs     10/17/21  0333 10/17/21  0812 10/18/21  1002 10/18/21  1129 10/18/21  1627 10/18/21  2022 10/19/21  9581 10/19/21  1114   PROT 6.4  --   --   --   --   --   --   --    LABALBU 3.1*  --   --   --   --   --   --   --    AST 17  --   --   --   --   --   --   --    ALT 16  --   --   --   --   --   --   --    ALKPHOS 197*  --   --   --   --   --   --   --    BILITOT 0.51  --   --   --   --   --   --   --    POCGLU  --    < > 160* 174* 141* 143* 104 125*    < > = values in this interval not displayed. ABG:  Lab Results   Component Value Date    POCPH 7.484 10/15/2021    PHART 7.330 06/18/2014    POCPCO2 41.0 10/15/2021    OOG0SGZ 68.0 06/18/2014    POCPO2 128.4 10/15/2021    PO2ART 84.0 06/18/2014    POCHCO3 30.9 10/15/2021    IOJ1QNC 34.9 06/18/2014    NBEA NOT REPORTED 10/15/2021    PBEA 7 10/15/2021    YUL8VYH NOT REPORTED 10/15/2021    BZXN6LEQ 99 10/15/2021    G2YQULRQ 96.5 06/18/2014    FIO2 3.5 10/15/2021     Lab Results   Component Value Date/Time    SPECIAL LH 8ML 10/15/2021 08:04 AM     Lab Results   Component Value Date/Time    CULTURE NO GROWTH 4 DAYS 10/15/2021 08:04 AM       Radiology:  XR CHEST PORTABLE    Result Date: 10/15/2021  1. Uncomplicated line placement. 2. Rapid development of bilateral airspace opacities favors pulmonary edema.      XR CHEST PORTABLE    Result Date: 10/14/2021  No acute cardiopulmonary process       Physical Examination:        General appearance:  alert, cooperative and no distress  Mental Status:  oriented to person, place and time and normal affect  Lungs:  clear to auscultation bilaterally, normal effort  Heart:  regular rate and rhythm, no murmur  Abdomen:  soft, nontender, nondistended, normal bowel sounds, no masses, hepatomegaly, splenomegaly  Extremities:  no edema, redness, tenderness in the calves  Skin:  no gross lesions, rashes, induration    Assessment:        Hospital Problems         Last Modified POA    * (Principal) Klebsiella sepsis ([facility] 97/82/6412 Yes    Alcoholic cirrhosis of liver ([facility] sober since 2013 10/14/2021 Yes    Type 2 diabetes mellitus with diabetic neuropathy, with long-term current use of insulin ([facility] 10/14/2021 Yes    Essential hypertension, currently hypotensive 10/15/2021 Yes    FABI (obstructive sleep apnea) 10/14/2021 Yes    Acquired hypothyroidism 10/14/2021 Yes    Anemia 10/14/2021 Yes    Gastroesophageal reflux disease without esophagitis 10/14/2021 Yes    Morbid obesity with BMI of 50.0-59.9, adult [facility] 10/14/2021 Yes    Anxiety and depression 10/14/2021 Yes    BPH (benign prostatic hyperplasia) 65/56/2665 Yes    Diastolic congestive heart failure ([facility] 10/14/2021 Yes    Hypotension 10/14/2021 Yes    Septic shock ([facility] 10/15/2021 Yes          Plan:        Klebsiella sepsis with hypotension and septic shock  Septic shock resolved, no longer hypotensive  Klebsiella bacteremia sensitive to Cipro, IV Cipro continued and will transition to oral at discharge.   Stable for discharge to skilled nursing facility once arrangements can be made  Type 2 diabetes with severe morbid obesity and GERD  Glycemic control  Education on the need for diet lifestyle modifications  PPI  Essential hypertension diastolic heart failure  Avoid aggressive IV fluid resuscitation at this point  Restart antihypertensives  Liver cirrhosis  Avoid hepatotoxic medications,  Reinforce education the need for abstinence from alcohol, patient expresses sobriety for many years    SUNSHINE Sheppard - NP  10/19/2021  2:11 PM

## 2021-10-20 VITALS
TEMPERATURE: 98.2 F | RESPIRATION RATE: 18 BRPM | OXYGEN SATURATION: 93 % | SYSTOLIC BLOOD PRESSURE: 180 MMHG | DIASTOLIC BLOOD PRESSURE: 76 MMHG | WEIGHT: 315 LBS | HEIGHT: 70 IN | HEART RATE: 70 BPM | BODY MASS INDEX: 45.1 KG/M2

## 2021-10-20 LAB
ABSOLUTE EOS #: 0.18 K/UL (ref 0–0.4)
ABSOLUTE IMMATURE GRANULOCYTE: 0.27 K/UL (ref 0–0.3)
ABSOLUTE LYMPH #: 0.64 K/UL (ref 1–4.8)
ABSOLUTE MONO #: 0.73 K/UL (ref 0.2–0.8)
BASOPHILS # BLD: 1 %
BASOPHILS ABSOLUTE: 0.09 K/UL (ref 0–0.2)
DIFFERENTIAL TYPE: ABNORMAL
EOSINOPHILS RELATIVE PERCENT: 2 % (ref 1–4)
GLUCOSE BLD-MCNC: 111 MG/DL (ref 75–110)
GLUCOSE BLD-MCNC: 163 MG/DL (ref 75–110)
HCT VFR BLD CALC: 31.6 % (ref 40.7–50.3)
HEMOGLOBIN: 9.1 G/DL (ref 13–17)
IMMATURE GRANULOCYTES: 3 %
LYMPHOCYTES # BLD: 7 % (ref 24–44)
MCH RBC QN AUTO: 24.5 PG (ref 25.2–33.5)
MCHC RBC AUTO-ENTMCNC: 28.8 G/DL (ref 28.4–34.8)
MCV RBC AUTO: 85.2 FL (ref 82.6–102.9)
MONOCYTES # BLD: 8 % (ref 1–7)
NRBC AUTOMATED: 0 PER 100 WBC
PDW BLD-RTO: 15 % (ref 11.8–14.4)
PLATELET # BLD: 122 K/UL (ref 138–453)
PLATELET ESTIMATE: ABNORMAL
PMV BLD AUTO: 11.1 FL (ref 8.1–13.5)
RBC # BLD: 3.71 M/UL (ref 4.21–5.77)
RBC # BLD: ABNORMAL 10*6/UL
SARS-COV-2, RAPID: NOT DETECTED
SEG NEUTROPHILS: 79 % (ref 36–66)
SEGMENTED NEUTROPHILS ABSOLUTE COUNT: 7.19 K/UL (ref 1.8–7.7)
SPECIMEN DESCRIPTION: NORMAL
WBC # BLD: 9.1 K/UL (ref 3.5–11.3)
WBC # BLD: ABNORMAL 10*3/UL

## 2021-10-20 PROCEDURE — 6370000000 HC RX 637 (ALT 250 FOR IP): Performed by: STUDENT IN AN ORGANIZED HEALTH CARE EDUCATION/TRAINING PROGRAM

## 2021-10-20 PROCEDURE — 6360000002 HC RX W HCPCS: Performed by: INTERNAL MEDICINE

## 2021-10-20 PROCEDURE — 2580000003 HC RX 258: Performed by: INTERNAL MEDICINE

## 2021-10-20 PROCEDURE — 87635 SARS-COV-2 COVID-19 AMP PRB: CPT

## 2021-10-20 PROCEDURE — 6370000000 HC RX 637 (ALT 250 FOR IP): Performed by: INTERNAL MEDICINE

## 2021-10-20 PROCEDURE — 99239 HOSP IP/OBS DSCHRG MGMT >30: CPT | Performed by: NURSE PRACTITIONER

## 2021-10-20 PROCEDURE — 36415 COLL VENOUS BLD VENIPUNCTURE: CPT

## 2021-10-20 PROCEDURE — 85025 COMPLETE CBC W/AUTO DIFF WBC: CPT

## 2021-10-20 PROCEDURE — 97535 SELF CARE MNGMENT TRAINING: CPT

## 2021-10-20 PROCEDURE — 82947 ASSAY GLUCOSE BLOOD QUANT: CPT

## 2021-10-20 PROCEDURE — 6370000000 HC RX 637 (ALT 250 FOR IP): Performed by: NURSE PRACTITIONER

## 2021-10-20 PROCEDURE — 97530 THERAPEUTIC ACTIVITIES: CPT

## 2021-10-20 RX ORDER — ONDANSETRON 2 MG/ML
4 INJECTION INTRAMUSCULAR; INTRAVENOUS EVERY 6 HOURS PRN
Status: DISCONTINUED | OUTPATIENT
Start: 2021-10-20 | End: 2021-10-20 | Stop reason: HOSPADM

## 2021-10-20 RX ADMIN — NADOLOL 40 MG: 20 TABLET ORAL at 09:12

## 2021-10-20 RX ADMIN — OXYCODONE AND ACETAMINOPHEN 1 TABLET: 5; 325 TABLET ORAL at 16:03

## 2021-10-20 RX ADMIN — DULOXETINE HYDROCHLORIDE 60 MG: 60 CAPSULE, DELAYED RELEASE ORAL at 09:13

## 2021-10-20 RX ADMIN — METFORMIN HYDROCHLORIDE 500 MG: 500 TABLET ORAL at 09:13

## 2021-10-20 RX ADMIN — INSULIN LISPRO 2 UNITS: 100 INJECTION, SOLUTION INTRAVENOUS; SUBCUTANEOUS at 12:41

## 2021-10-20 RX ADMIN — ASPIRIN 81 MG: 81 TABLET, COATED ORAL at 09:13

## 2021-10-20 RX ADMIN — ALOGLIPTIN 25 MG: 25 TABLET, FILM COATED ORAL at 09:12

## 2021-10-20 RX ADMIN — VENLAFAXINE HYDROCHLORIDE 150 MG: 75 CAPSULE, EXTENDED RELEASE ORAL at 09:12

## 2021-10-20 RX ADMIN — LEVOTHYROXINE SODIUM 175 MCG: 0.17 TABLET ORAL at 05:35

## 2021-10-20 RX ADMIN — CIPROFLOXACIN 500 MG: 500 TABLET ORAL at 09:12

## 2021-10-20 RX ADMIN — GLIPIZIDE 10 MG: 10 TABLET ORAL at 16:03

## 2021-10-20 RX ADMIN — OXYCODONE AND ACETAMINOPHEN 1 TABLET: 5; 325 TABLET ORAL at 09:13

## 2021-10-20 RX ADMIN — GLIPIZIDE 10 MG: 10 TABLET ORAL at 09:28

## 2021-10-20 RX ADMIN — OXYBUTYNIN CHLORIDE 5 MG: 5 TABLET ORAL at 09:13

## 2021-10-20 RX ADMIN — BUMETANIDE 2 MG: 1 TABLET ORAL at 09:12

## 2021-10-20 RX ADMIN — INSULIN GLARGINE 30 UNITS: 100 INJECTION, SOLUTION SUBCUTANEOUS at 09:13

## 2021-10-20 RX ADMIN — TAMSULOSIN HYDROCHLORIDE 0.4 MG: 0.4 CAPSULE ORAL at 09:12

## 2021-10-20 RX ADMIN — SODIUM CHLORIDE, PRESERVATIVE FREE 10 ML: 5 INJECTION INTRAVENOUS at 09:29

## 2021-10-20 RX ADMIN — OXYCODONE AND ACETAMINOPHEN 1 TABLET: 5; 325 TABLET ORAL at 00:52

## 2021-10-20 RX ADMIN — PREGABALIN 150 MG: 100 CAPSULE ORAL at 09:13

## 2021-10-20 RX ADMIN — ENOXAPARIN SODIUM 40 MG: 40 INJECTION SUBCUTANEOUS at 09:13

## 2021-10-20 ASSESSMENT — PAIN - FUNCTIONAL ASSESSMENT: PAIN_FUNCTIONAL_ASSESSMENT: PREVENTS OR INTERFERES SOME ACTIVE ACTIVITIES AND ADLS

## 2021-10-20 ASSESSMENT — PAIN SCALES - GENERAL
PAINLEVEL_OUTOF10: 7
PAINLEVEL_OUTOF10: 6
PAINLEVEL_OUTOF10: 9
PAINLEVEL_OUTOF10: 9
PAINLEVEL_OUTOF10: 7

## 2021-10-20 ASSESSMENT — PAIN DESCRIPTION - ONSET: ONSET: ON-GOING

## 2021-10-20 ASSESSMENT — PAIN DESCRIPTION - LOCATION
LOCATION: GENERALIZED
LOCATION: GROIN

## 2021-10-20 ASSESSMENT — PAIN DESCRIPTION - DESCRIPTORS: DESCRIPTORS: BURNING

## 2021-10-20 ASSESSMENT — PAIN DESCRIPTION - PAIN TYPE: TYPE: ACUTE PAIN

## 2021-10-20 ASSESSMENT — PAIN DESCRIPTION - FREQUENCY: FREQUENCY: CONTINUOUS

## 2021-10-20 NOTE — PLAN OF CARE
Problem: Skin Integrity:  Goal: Will show no infection signs and symptoms  Description: Will show no infection signs and symptoms  10/20/2021 1624 by Kacie García RN  Outcome: Completed  10/20/2021 1223 by Kacie García RN  Outcome: Ongoing  Goal: Absence of new skin breakdown  Description: Absence of new skin breakdown  10/20/2021 1624 by Kacie García RN  Outcome: Completed  10/20/2021 1223 by Kacie García RN  Outcome: Ongoing     Problem: Falls - Risk of:  Goal: Will remain free from falls  Description: Will remain free from falls  10/20/2021 1624 by Kacie García RN  Outcome: Completed  10/20/2021 1223 by Kacie García RN  Outcome: Ongoing  Goal: Absence of physical injury  Description: Absence of physical injury  10/20/2021 1624 by Kacie García RN  Outcome: Completed  10/20/2021 1223 by Kacie García RN  Outcome: Ongoing     Problem: Pain:  Description: Pain management should include both nonpharmacologic and pharmacologic interventions.   Goal: Pain level will decrease  Description: Pain level will decrease  Outcome: Completed  Goal: Control of acute pain  Description: Control of acute pain  Outcome: Completed  Goal: Control of chronic pain  Description: Control of chronic pain  10/20/2021 1624 by Kacie García RN  Outcome: Completed  10/20/2021 1223 by Kacie García RN  Outcome: Ongoing

## 2021-10-20 NOTE — PROGRESS NOTES
The [facility] in stable condition. Report was called to Mahesh Mancilla at [facility].

## 2021-10-20 NOTE — PROGRESS NOTES
[facility]  Office: [address], DO, Nilsarnold Gipson, DO, Claude Gómez, DO, Hope Moseley Blood, DO, Jacoby Cheema MD, Dayanna Royal MD, Chevy Danielson MD, Brooklyn Kenney MD, Dianne Anguiano MD, Wesly Herrmann MD, Frank Scruggs MD, Prashanth Dumont MD, Joel Remy, DO, Júnior Flores, DO, Mamie Mc MD,  Loco Curry DO, Deshaun Mccullough MD, Cole Swann MD, Rocael Anderson MD, Tammy Scanlon MD, Navneet Nguyen MD, Gianluca Ivan MD, Gio Teague, [facility], [facility], CNP, Chana Smith, CNP, Nelson Tello, CNS, Ailene Lesches, CNP, Sisi Monge, CNP, Jodie Benitez, CNP, Sara Brice, CNP, Mary Rhodes, CNP, Maura Figueredo, CNP, Michael Brooke PA-C, Amy Aguilar, [facility], Elizabet Bradshaw, CNP, Federico Zepeda, CNP, Olivia Weber, CNP, Navdeep Hawley, CNP, Ayde Grullon, CNP, Ana Gunderson, [facility], [facility]    Progress Note    10/20/2021    9:04 AM    Name:   Nakia Zurita  MRN:     [id number]     Acct:      [de-identified]   Room:   2021/2021-01  IP Day:  6  Admit Date:  10/14/2021  3:47 PM    PCP:   Clara Griffin MD  Code Status:  Full Code    Subjective:     C/C:   Chief Complaint   Patient presents with    Other     Blood in [address] causing belly pain     Interval History Status: not changed. Very little change in condition. Patient continues to receive Cipro for his bacteremia. PT OT is working with patient and recommends SNF. Patient is agreeable. Patient awaiting placement. Medically stable for discharge once arrangements can be made. Bed available today, patient discharged for continued therapy. Brief History: This is a 14-year-old male with chronic indwelling Clemons presents with hematuria and lower abdominal pain.  He is found evidence of urinary tract infection is admitted for IV antibiotics and further evaluation. Subsequently found to have Klebsiella bacteremia that is sensitive for Cipro. Patient progressed well during his hospital course and will be discharged to skilled nursing facility once bed arrangements can be made. Review of Systems:     Constitutional:  negative for chills, fevers, sweats  Respiratory:  negative for cough, dyspnea on exertion, shortness of breath, wheezing  Cardiovascular:  negative for chest pain, chest pressure/discomfort, lower extremity edema, palpitations  Gastrointestinal:  negative for abdominal pain, constipation, diarrhea, nausea, vomiting  Neurological:  negative for dizziness, headache    Medications: Allergies:     Allergies   Allergen Reactions    No Known Allergies        Current Meds:   Scheduled Meds:    glipiZIDE  10 mg Oral BID AC    metFORMIN  500 mg Oral BID WC    alogliptin  25 mg Oral Daily    bumetanide  2 mg Oral BID    ciprofloxacin  500 mg Oral 2 times per day    enoxaparin  40 mg SubCUTAneous BID    aspirin  81 mg Oral Daily    DULoxetine  60 mg Oral QAM    nadolol  40 mg Oral Daily    pregabalin  150 mg Oral BID    morphine  4 mg IntraMUSCular Once    tamsulosin  0.4 mg Oral Daily    oxybutynin  5 mg Oral BID    levothyroxine  175 mcg Oral Daily    insulin glargine  30 Units SubCUTAneous BID    venlafaxine  150 mg Oral Daily    QUEtiapine  50 mg Oral Nightly    sodium chloride flush  5-40 mL IntraVENous 2 times per day    insulin lispro  0-12 Units SubCUTAneous TID     insulin lispro  0-6 Units SubCUTAneous Nightly     Continuous Infusions:    sodium chloride      dextrose       PRN Meds: ondansetron, calcium carbonate, magnesium sulfate, potassium chloride **OR** potassium alternative oral replacement **OR** potassium chloride, albuterol sulfate HFA, clonazePAM, diphenoxylate-atropine, oxyCODONE-acetaminophen, sodium chloride flush, sodium chloride, acetaminophen **OR** acetaminophen, glucose, dextrose, glucagon (rDNA), dextrose    Data:     Past Medical History:   has a past medical history of Anxiety and depression, Back pain, chronic, BPH (benign prostatic hyperplasia), CHF (congestive heart failure) ([facility] Diabetes mellitus ([facility] GERD (gastroesophageal reflux disease), H/O cardiac catheterization, Hepatitis, Hiatal hernia, History of alcohol abuse, Hyperlipidemia, Hypertension, IBS (irritable bowel syndrome), Morbid obesity ([facility] Neuropathy, On home oxygen therapy, Pancreatitis, Primary osteoarthritis of right knee, Sleep apnea, Thyroid disease, and Urinary bladder neurogenic dysfunction. Social History:   reports that he has never smoked. He has never used smokeless tobacco. He reports that he does not drink alcohol and does not use drugs. Family History:   Family History   Adopted: Yes       Vitals:  BP (!) 180/76   Pulse 70   Temp 98.2 °F (36.8 °C) (Oral)   Resp 18   Ht 5' 10\" (1.778 m)   Wt (!) 400 lb (181.4 kg)   SpO2 93%   BMI 57.39 kg/m²   Temp (24hrs), Av.2 °F (36.8 °C), Min:97.9 °F (36.6 °C), Max:98.4 °F (36.9 °C)    Recent Labs     10/19/21  1114 10/19/21  1627 10/19/21  1951 10/20/21  0644   POCGLU 125* 143* 88 111*       I/O (24Hr):     Intake/Output Summary (Last 24 hours) at 10/20/2021 0904  Last data filed at 10/20/2021 0538  Gross per 24 hour   Intake --   Output 4650 ml   Net -4650 ml       Labs:  Hematology:  Recent Labs     10/19/21  0756 10/19/21  0846 10/20/21  0633   WBC  --  7.8 9.1   RBC  --  3.80* 3.71*   HGB 8.7* 9.5* 9.1*   HCT 31.3* 33.7* 31.6*   MCV  --  88.7 85.2   MCH  --  25.0* 24.5*   MCHC  --  28.2* 28.8   RDW  --  15.0* 15.0*   PLT  --  91* 122*   MPV  --  10.3 11.1     Chemistry:  Recent Labs     10/19/21  0846      K 4.1      CO2 30   GLUCOSE 118*   BUN 17   CREATININE 0.80   MG 2.1   ANIONGAP 10   LABGLOM >60   GFRAA >60   CALCIUM 8.9     Recent Labs     10/18/21  2022 10/19/21  0608 10/19/21  1114 10/19/21  1627 10/19/21  1951 10/20/21  0644   POCGLU 143* 104 125* 143* 88 111*     ABG:  Lab Results   Component Value Date POCPH 7.484 10/15/2021    PHART 7.330 06/18/2014    POCPCO2 41.0 10/15/2021    OZU2CEO 68.0 06/18/2014    POCPO2 128.4 10/15/2021    PO2ART 84.0 06/18/2014    POCHCO3 30.9 10/15/2021    PUT1NAV 34.9 06/18/2014    NBEA NOT REPORTED 10/15/2021    PBEA 7 10/15/2021    EXZ4VMQ NOT REPORTED 10/15/2021    HVJQ2RTA 99 10/15/2021    U8ICZGTD 96.5 06/18/2014    FIO2 3.5 10/15/2021     Lab Results   Component Value Date/Time    SPECIAL LH 8ML 10/15/2021 08:04 AM     Lab Results   Component Value Date/Time    CULTURE NO GROWTH 5 DAYS 10/15/2021 08:04 AM       Radiology:  XR CHEST PORTABLE    Result Date: 10/15/2021  1. Uncomplicated line placement. 2. Rapid development of bilateral airspace opacities favors pulmonary edema.      XR CHEST PORTABLE    Result Date: 10/14/2021  No acute cardiopulmonary process       Physical Examination:        General appearance:  alert, cooperative and no distress  Mental Status:  oriented to person, place and time and normal affect  Lungs:  clear to auscultation bilaterally, normal effort  Heart:  regular rate and rhythm, no murmur  Abdomen:  soft, nontender, nondistended, normal bowel sounds, no masses, hepatomegaly, splenomegaly  Extremities:  no edema, redness, tenderness in the calves  Skin:  no gross lesions, rashes, induration    Assessment:        Hospital Problems         Last Modified POA    * (Principal) Klebsiella sepsis ([facility] 16/45/8711 Yes    Alcoholic cirrhosis of liver ([facility] sober since 2013 10/14/2021 Yes    Type 2 diabetes mellitus with diabetic neuropathy, with long-term current use of insulin ([address] 10/14/2021 Yes    Essential hypertension, currently hypotensive 10/15/2021 Yes    FABI (obstructive sleep apnea) 10/14/2021 Yes    Acquired hypothyroidism 10/14/2021 Yes    Anemia 10/14/2021 Yes    Gastroesophageal reflux disease without esophagitis 10/14/2021 Yes    Morbid obesity with BMI of 50.0-59.9, adult ([facility] 10/14/2021 Yes    Anxiety and depression 10/14/2021 Yes    BPH

## 2021-10-20 NOTE — DISCHARGE SUMMARY
[facility]  Office: [address], DO, Manohar Stuart, DO, Omar Petty, DO, Norberto Mckenzie Blood, DO, David Mcdonough MD, Jeanette Buenrostro MD, Linda Monroe MD, Marisol Mcfarlane MD, Andreas Menard MD, Tommie Mac MD, Hoa Iqbal MD, Nisha Jean Baptiste MD, August Moya, DO, Ashlee Brennan DO, Ovi Rucker MD,  Angelika Westfall, DO, Mart Love MD, Charisse Hardin MD, Sadia Trammell MD, Ha Cosby MD, Latoya Burkett MD, Jared David MD, Tyson Neal [facility], [facility], CNP, Johnny Limon, CNP, Kiarra Luz, CNS, Quyen Sampson, CNP, Mirna Melendez, CNP, Jake Knight, CNP, Dominique Clarke, CNP, Lobo Cunningham, CNP, Olivia Bocye, CNP, Radha Alvarez PA-C, Charmel Osgood, [facility], Adry Ivey, CNP, Jesusita Mendosa, CNP, Kyra Lutz, CNP, Paradise Westafll, CNP, Tiney Schilder, CNP, Marlyne Osgood, CNP, [facility]    Discharge Summary     Patient ID: Shanti Hardin  :  1965   MRN: [id number]     ACCOUNT:  [de-identified]   Patient's PCP: Derek Tse MD  Admit Date: 10/14/2021   Discharge Date: 10/20/2021     Length of Stay: 6  Code Status:  Full Code  Admitting Physician: Radha Rosales DO  Discharge Physician: SUNSHINE Brenner NP     Active Discharge Diagnoses:     Hospital Problem Lists:  Principal Problem:    Klebsiella sepsis [facility])  Active Problems:    Alcoholic cirrhosis of liver [facility]), sober since     Type 2 diabetes mellitus with diabetic neuropathy, with long-term current use of insulin ([facility]    Essential hypertension, currently hypotensive    FABI (obstructive sleep apnea)    Acquired hypothyroidism    Anemia    Gastroesophageal reflux disease without esophagitis    Morbid obesity with BMI of 50.0-59.9, adult ([address]    Anxiety and depression    BPH (benign prostatic hyperplasia)    Diastolic congestive heart failure ([address]    Hypotension    Septic shock ([address]    Urinary tract infection without hematuria  Resolved Problems:    * No resolved hospital problems. *      Admission Condition:  poor     Discharged Condition: fair    Hospital Stay:     Hospital Course:  Mainor Rodrigues is a 61 y.o. male who was admitted for the management of  Klebsiella sepsis ([address] , presented to ER with Other (Blood in cather causing belly pain)       This is a 48-year-old male with chronic indwelling Clemons presents with hematuria and lower abdominal pain. [facility] is found evidence of urinary tract infection is admitted for IV antibiotics and further evaluation. Subsequently found to have Klebsiella bacteremia that is sensitive for Cipro. Patient progressed well during his hospital course and will be discharged to skilled nursing facility once bed arrangements can be made.     Significant therapeutic interventions:  Clemons replacement, IV resuscitation, blood cultures, culture and sensitivity, IV Cipro, PT/OT    Significant Diagnostic Studies:   Labs / Micro:  CBC:   Lab Results   Component Value Date    WBC 9.1 10/20/2021    RBC 3.71 10/20/2021    RBC 3.93 04/02/2012    HGB 9.1 10/20/2021    HCT 31.6 10/20/2021    MCV 85.2 10/20/2021    MCH 24.5 10/20/2021    MCHC 28.8 10/20/2021    RDW 15.0 10/20/2021     10/20/2021     04/02/2012     BMP:    Lab Results   Component Value Date    GLUCOSE 118 10/19/2021    GLUCOSE 102 08/30/2011     10/19/2021    K 4.1 10/19/2021     10/19/2021    CO2 30 10/19/2021    ANIONGAP 10 10/19/2021    BUN 17 10/19/2021    CREATININE 0.80 10/19/2021    BUNCRER 21 10/19/2021    CALCIUM 8.9 10/19/2021    LABGLOM >60 10/19/2021    GFRAA >60 10/19/2021    GFR      10/19/2021    GFR NOT REPORTED 10/19/2021     U/A:    Lab Results   Component Value Date    COLORU Red 10/14/2021    TURBIDITY Cloudy 10/14/2021    SPECGRAV 1.015 10/14/2021    HGBUR 3+ 10/14/2021    PHUR 7.0 10/14/2021    PROTEINU 3+ 10/14/2021    GLUCOSEU NEGATIVE 10/14/2021    GLUCOSEU 1+ 08/30/2011    ADA TRACE 10/14/2021    BILIRUBINUR  10/14/2021     Presumptive positive. Unable to confirm due to unavailability of reagent. BILIRUBINUR neg 05/04/2016    BILIRUBINUR 3+ 08/30/2011    UROBILINOGEN ELEVATED 10/14/2021    NITRU POSITIVE 10/14/2021    LEUKOCYTESUR MOD 10/14/2021        Radiology:  XR CHEST PORTABLE    Result Date: 10/15/2021  1. Uncomplicated line placement. 2. Rapid development of bilateral airspace opacities favors pulmonary edema. XR CHEST PORTABLE    Result Date: 10/14/2021  No acute cardiopulmonary process       Consultations:    Consults:     Final Specialist Recommendations/Findings:   IP CONSULT TO HOSPITALIST  IP CONSULT TO SOCIAL WORK      The patient was seen and examined on day of discharge and this discharge summary is in conjunction with any daily progress note from day of discharge. Discharge plan:     Disposition: [facility]    Physician Follow Up:     No follow-up provider specified. Requiring Further Evaluation/Follow Up POST HOSPITALIZATION/Incidental Findings: Requires continue PT/OT    Diet: diabetic diet    Activity: As tolerated    Instructions to Patient: Take the antibiotics exactly as prescribed. Follow-up with your primary care provider for continued management. Engage with a therapist at the facility to regain your strength and mobility. Discharge Medications:      Medication List      START taking these medications    ciprofloxacin 500 MG tablet  Commonly known as: CIPRO  Take 1 tablet by mouth every 12 hours for 10 days        CHANGE how you take these medications    Sophieaglar KwikPen 100 UNIT/ML injection pen  Generic drug: insulin glargine  What changed: Another medication with the same name was removed. Continue taking this medication, and follow the directions you see here. triamcinolone 0.025 % cream  Commonly known as: KENALOG  What changed: Another medication with the same name was removed.  Continue taking this medication, and follow the directions you see here.        Dwight Lovelace taking these medications    aspirin 81 MG EC tablet  Take 1 tablet by mouth daily     Bumex 2 MG tablet  Generic drug: bumetanide     clonazePAM 1 MG tablet  Commonly known as: KLONOPIN  Take 1 tablet by mouth 2 times daily as needed for Anxiety for up to 3 days. diphenoxylate-atropine 2.5-0.025 MG per tablet  Commonly known as: LOMOTIL     DULoxetine 60 MG extended release capsule  Commonly known as: CYMBALTA     esomeprazole 40 MG delayed release capsule  Commonly known as: NEXIUM     fluocinonide 0.05 % external solution  Commonly known as: LIDEX     glipiZIDE 10 MG tablet  Commonly known as: GLUCOTROL     ibuprofen 600 MG tablet  Commonly known as: ADVIL;MOTRIN     Januvia 100 MG tablet  Generic drug: SITagliptin     ketoconazole 2 % shampoo  Commonly known as: NIZORAL     levothyroxine 175 MCG tablet  Commonly known as: SYNTHROID     metFORMIN 500 MG tablet  Commonly known as: GLUCOPHAGE     nadolol 40 MG tablet  Commonly known as: CORGARD     * nystatin 266451 UNIT/GM cream  Commonly known as: MYCOSTATIN     * nystatin [id number] UNIT/GM powder  Commonly known as: MYCOSTATIN     oxybutynin 5 MG tablet  Commonly known as: DITROPAN  Take 1 tablet by mouth 2 times daily     oxyCODONE-acetaminophen 7.5-325 MG per tablet  Commonly known as: PERCOCET     pregabalin 150 MG capsule  Commonly known as: LYRICA     ProAir  (90 Base) MCG/ACT inhaler  Generic drug: albuterol sulfate HFA     QUEtiapine 50 MG extended release tablet  Commonly known as: SEROQUEL XR     tamsulosin 0.4 MG capsule  Commonly known as: Flomax  Take 1 capsule by mouth daily     venlafaxine 150 MG extended release capsule  Commonly known as: EFFEXOR XR         * This list has 2 medication(s) that are the same as other medications prescribed for you. Read the directions carefully, and ask your doctor or other care provider to review them with you.             STOP taking these medications    acetaminophen 325 MG

## 2021-10-20 NOTE — PROGRESS NOTES
Occupational Therapy  Facility/Department: [facility]  Daily Treatment Note  NAME: Zac Bradley  : 1965  MRN: [id number]    Date of Service: 10/20/2021    Discharge Recommendations:  Patient would benefit from continued therapy after discharge   Pt currently functioning below baseline. Would suggest additional therapy at time of discharge to maximize long term outcomes and prevent re-admission. Please refer to AM-PAC score for current level of function. Assessment   Performance deficits / Impairments: Decreased functional mobility ; Decreased ADL status; Decreased safe awareness;Decreased cognition;Decreased strength;Decreased endurance;Decreased sensation;Decreased fine motor control;Decreased high-level IADLs;Decreased posture;Decreased balance;Decreased coordination;Decreased vision/visual deficit  Assessment: Pt slowly progressing towards goals but is still limited by overall fatigue, weakness, cognitive deficits and shortness of breath. Pt would benefit from continued skilled OT services to Maximize I and safety during functional tasks to reduce caregiver burden as able. Prognosis: Fair  REQUIRES OT FOLLOW UP: Yes  Activity Tolerance  Activity Tolerance: Patient limited by fatigue;Treatment limited secondary to decreased cognition  Activity Tolerance: P+  Safety Devices  Safety Devices in place: Yes  Type of devices: All fall risk precautions in place; Left in chair;Call light within reach;Nurse notified; Chair alarm in place;Gait belt;Patient at risk for falls         Patient Diagnosis(es): The encounter diagnosis was Urinary tract infection without hematuria, site unspecified.       has a past medical history of Anxiety and depression, Back pain, chronic, BPH (benign prostatic hyperplasia), CHF (congestive heart failure) ([facility] Cirrhosis ([facility] [address] Diabetes mellitus ([facility] [address] GERD (gastroesophageal reflux disease), H/O cardiac catheterization, Hepatitis, Hiatal hernia, History of alcohol abuse, Hyperlipidemia, Hypertension, IBS (irritable bowel syndrome), Morbid obesity ([address] Neuropathy, On home oxygen therapy, Pancreatitis, Primary osteoarthritis of right knee, Sleep apnea, Thyroid disease, and Urinary bladder neurogenic dysfunction. has a past surgical history that includes Colonoscopy; Abdomen surgery; hernia repair; Endoscopy, colon, diagnostic; Upper gastrointestinal endoscopy (07/19/2017); pr egd transoral biopsy single/multiple (N/A, 7/19/2017); pr deep dissec foot infec,1 bursa (Bilateral, 8/22/2017); Cystoscopy (01/24/2018); pr cystourethroscopy (N/A, 1/24/2018); Incisional hernia repair (05/20/2018); ventral hernia repair (N/A, 5/20/2018); Cystocopy (02/20/2019); Cystoscopy (N/A, 2/20/2019); Upper gastrointestinal endoscopy (N/A, 3/14/2019); and Cystoscopy (N/A, 8/23/2019). Restrictions  Restrictions/Precautions  Restrictions/Precautions: General Precautions, Contact Precautions, Fall Risk, Up as Tolerated  Required Braces or Orthoses?: No  Position Activity Restriction  Other position/activity restrictions: 2L O2, telemetry, Clemons Cath, RUE IV, elevate heels off bed, contact iso, up with assist  Subjective   General  Chart Reviewed: Yes  Patient assessed for rehabilitation services?: Yes  Response to previous treatment: Patient with no complaints from previous session  Family / Caregiver Present: No  Subjective  Subjective: Pt in bed upon arrival. Pt moaning and c/o pain to left side (abdominal area) at 9/10. RN notified. Pt agreeable to therapy stating \"whatever you gotta do! \"  General Comment  Comments: RN ok'd pt for therapy. Orientation  Orientation  Overall Orientation Status: Impaired  Objective             Balance  Sitting Balance: Stand by assistance  Standing Balance:  Moderate assistance (x2)  Standing Balance  Time: 20-30 seconds  Activity: static standing with walker  Bed mobility  Supine to Sit: Minimal assistance;2 Person assistance (with max verbal/tactile cues for sequencing movements, use of bed rail and overall safety. Total assist for line mgmt.)  Transfers  Stand Step Transfers: Moderate assistance;2 Person assistance  Sit to stand: Moderate assistance;2 Person assistance  Stand to sit: Moderate assistance;2 Person assistance  Transfer Comments: Verbal/physical cues for hand placement, nose over toes, upright posture and overall safety. Total assist with wells/O2 mgmt. Cognition  Overall Cognitive Status: Exceptions  Arousal/Alertness: Appropriate responses to stimuli  Following Commands: Follows one step commands with repetition  Attention Span: Appears intact; Attends with cues to redirect  Memory: Decreased short term memory;Decreased recall of recent events  Safety Judgement: Decreased awareness of need for assistance;Decreased awareness of need for safety  Problem Solving: Assistance required to generate solutions;Assistance required to identify errors made;Decreased awareness of errors;Assistance required to correct errors made;Assistance required to implement solutions  Insights: Decreased awareness of deficits  Initiation: Requires cues for all  Sequencing: Requires cues for all  Cognition Comment: Pt presents with cognitive deficits.      Perception  Overall Perceptual Status: Impaired  Initiation: Cues to initiate tasks                                   Plan   Plan  Times per week: 4-5x/wk 1x/day as latrice  Current Treatment Recommendations: Strengthening, Endurance Training, Balance Training, Functional Mobility Training, Safety Education & Training, Pain Management, Self-Care / ADL, Equipment Evaluation, Education, & procurement, Positioning           AM-PAC Score        AM-PAC Inpatient Daily Activity Raw Score: 15 (10/20/21 0950)  AM-PAC Inpatient ADL T-Scale Score : 34.69 (10/20/21 0950)  ADL Inpatient CMS 0-100% Score: 56.46 (10/20/21 0950)  ADL Inpatient CMS G-Code Modifier : CK (10/20/21 0950)    Goals  Short term goals  Time Frame for Short term goals: By discharge, pt to demo  Short term goal 1: Mod A for ADL transfers and functional mob with AD as needed and Good safety  Short term goal 2: bed mobility to Mod A of 1 with use of bedrail and lift as needed. Short term goal 3: UB ADLs to Min A and LB ADLs to Max A with use of AD/AE as needed. Short term goal 4: increased sitting latrice > 20 min with SBA to assist with participation in ADL routine. Short term goal 5: increased B UE strength by 1/2 grade to assist with functional tasks. Long term goals  Long term goal 1: Pt/caregivers to be I with fall prevention education, EC/WS tech, B UE HEP, pursed lip breathing and pressure relief with use of handouts as needed. Patient Goals   Patient goals : To feel better and go home! Therapy Time   Individual Concurrent Group Co-treatment   Time In       0857   Time Out       0924   Minutes       32     Co-treatment with PT warranted secondary to decreased safety and independence requiring 2 skilled therapy professionals to address individual discipline's goals. OT addressing \"preparation for ADL transfer\",\"sitting balance for increased ADL performance\",\"sitting/activity tolerance\",\"functional reaching\",\"environmental safety/scanning\",\"fall prevention\",\"functional mobility for ADL transfers\",\"ability to sequence and follow directions\",\"functional UE strength\".          Yayo Aguiar, MICHELLE

## 2021-10-20 NOTE — PROGRESS NOTES
Physical Therapy  Facility/Department: [facility] MED SURG  Daily Treatment Note  NAME: Madie Nathan  : 1965  MRN: [id number]    Date of Service: 10/20/2021    Discharge Recommendations:  Patient would benefit from continued therapy after discharge        Assessment   Body structures, Functions, Activity limitations: Decreased functional mobility ; Decreased strength;Decreased endurance;Decreased balance  Assessment: Pt hindered by poor activity tolerance  Activity Tolerance  Activity Tolerance: Patient limited by endurance; Patient limited by fatigue     Patient Diagnosis(es): The encounter diagnosis was Urinary tract infection without hematuria, site unspecified. has a past medical history of Anxiety and depression, Back pain, chronic, BPH (benign prostatic hyperplasia), CHF (congestive heart failure) ([facility] [address] Cirrhosis ([facility] [address] Diabetes mellitus ([facility] [address] GERD (gastroesophageal reflux disease), H/O cardiac catheterization, Hepatitis, Hiatal hernia, History of alcohol abuse, Hyperlipidemia, Hypertension, IBS (irritable bowel syndrome), Morbid obesity ([facility] [address] Neuropathy, On home oxygen therapy, Pancreatitis, Primary osteoarthritis of right knee, Sleep apnea, Thyroid disease, and Urinary bladder neurogenic dysfunction. has a past surgical history that includes Colonoscopy; Abdomen surgery; hernia repair; Endoscopy, colon, diagnostic; Upper gastrointestinal endoscopy (2017); pr egd transoral biopsy single/multiple (N/A, 2017); pr deep dissec foot infec,1 bursa (Bilateral, 2017); Cystoscopy (2018); pr cystourethroscopy (N/A, 2018); Incisional hernia repair (2018); ventral hernia repair (N/A, 2018); Cystocopy (2019); Cystoscopy (N/A, 2019); Upper gastrointestinal endoscopy (N/A, 3/14/2019); and Cystoscopy (N/A, 2019).     Restrictions  Restrictions/Precautions  Restrictions/Precautions: General Precautions, Contact Precautions, Fall Risk, Up as Tolerated  Required Braces or Orthoses?: No  Position Activity Restriction  Other position/activity restrictions: 2L O2, telemetry, Wells Cath, RUE IV, elevate heels off bed, contact iso, up with assist  Subjective   General  Chart Reviewed: Yes  Subjective  Subjective: Pt initially reticent to participate but did acquiesce and agree to participate  Pain Screening  Patient Currently in Pain: Yes  Pain Assessment  Pain Assessment: 0-10  Pain Level: 9  Pain Location: Generalized  Vital Signs  Patient Currently in Pain: Yes       Orientation     Cognition      Objective     Transfers  Sit to Stand: 2 Person Assistance; Moderate Assistance  Stand to sit: Moderate Assistance;2 Person Assistance  Stand Pivot Transfers: Moderate Assistance;2 Person Assistance  Ambulation  Ambulation?: Yes  Ambulation 1  Surface: level tile  Device: Rolling Walker  Other Apparatus: O2  Assistance: Moderate assistance;2 Person assistance  Gait Deviations: Decreased step length;Decreased step height  Distance: 6 ft     Balance  Balance  Sitting Balance: Stand by assistance  Standing Balance: Moderate assistance (x2)  Standing Balance  Time: 20-30 seconds  Activity: static standing with walker  Bed mobility  Supine to Sit: Minimal assistance;2 Person assistance (with max verbal/tactile cues for sequencing movements, use of bed rail and overall safety. Total assist for line mgmt.)  Transfers  Stand Step Transfers: Moderate assistance;2 Person assistance  Sit to stand: Moderate assistance;2 Person assistance  Stand to sit: Moderate assistance;2 Person assistance  Transfer Comments: Verbal/physical cues for hand placement, nose over toes, upright posture and overall safety. Total assist with wells/O2 mgmt.         Posture: Poor  Sitting - Static: Good  Sitting - Dynamic: Good  Standing - Static: Poor;+  Standing - Dynamic: Poor            Comment: assisted pt to bedside chair              G-Code     OutComes Score                                                     AM-PAC Score  [facility] Inpatient Mobility Raw Score : 9 (10/20/21 1249)  AM-PAC Inpatient T-Scale Score : 30.55 (10/20/21 1249)  Mobility Inpatient CMS 0-100% Score: 81.38 (10/20/21 1249)  Mobility Inpatient CMS G-Code Modifier : CM (10/20/21 1249)          Goals  Short term goals  Time Frame for Short term goals: 12 visits  Short term goal 1: Pt to demonstrate independent bed mobility  Short term goal 2: Pt to perform STS transfers w/ RW demonstrating proper hand placement MaxA  Short term goal 3: Pt to ambulate 25' x 1 w/ RW CGA  Short term goal 4: Pt to actively participate in at least 30 minutes of physical therapy for ther ex, ther act, balance, gait, and transfers  Short term goal 5: Good standing balance/ B LE strength increased 1/2 to 1 grade  Patient Goals   Patient goals : Go home    Plan    Plan  Times per week: 1-2x/day for 5-6 days/week  Specific instructions for Next Treatment: Wide RW for gait and transfers  Current Treatment Recommendations: Strengthening, Balance Training, Functional Mobility Training, Transfer Training, Gait Training, Endurance Training  Safety Devices  Type of devices: Left in chair, Call light within reach, Chair alarm in place, Gait belt  Restraints  Initially in place: No     Therapy Time   cotreat Concurrent Group Co-treatment   Time In  857         Time Out  925         Minutes  28               Co-treatment with OT warranted secondary to decreased safety and independence requiring 2 skilled therapy professionals to address individual discipline's goals. PT addressing preparation for  Transfers, gait and pre gait activities,  sitting balance for increased  sitting/activity tolerance, functional mobility, environmental safety/scanning, fall prevention, functional mobility  transfers and functional LE strength. Upon writer exit, call light within reach, pt retired to bed. All lines intact and patient positioned comfortably. All patient needs addressed prior to ending therapy session. Chart reviewed prior to treatment and patient is agreeable for therapy. RN reports patient is medically stable for therapy treatment this date.        YASMINE GARCIA, PTA

## 2021-10-20 NOTE — PLAN OF CARE
Problem: Skin Integrity:  Goal: Will show no infection signs and symptoms  Description: Will show no infection signs and symptoms  10/20/2021 0127 by Foreign Roach RN  Outcome: Ongoing  Note: Checked for incontinence every 2 hours and prn. Pericare as needed. Assisted to reposition off back frequently. Heels off bed with pillows. Problem: Skin Integrity:  Goal: Absence of new skin breakdown  Description: Absence of new skin breakdown  Outcome: Ongoing     Problem: Falls - Risk of:  Goal: Will remain free from falls  Description: Will remain free from falls  10/20/2021 0127 by Foreign Roach RN  Outcome: Ongoing  Note: Siderails up x 2  Hourly rounding  Call light in reach  Instructed to call for assist before attempting out of bed.   Remains free from falls and accidental injury at this time   Floor free from obstacles  Bed is locked and in lowest position  Adequate lighting provided  Bed alarm on, Red Falling star and Stay with Me signs posted         Problem: Falls - Risk of:  Goal: Absence of physical injury  Description: Absence of physical injury  Outcome: Ongoing     Problem: Pain:  Goal: Pain level will decrease  Description: Pain level will decrease  10/20/2021 0127 by Foreign Roach, RN  Outcome: Ongoing     Problem: Pain:  Goal: Control of acute pain  Description: Control of acute pain  Outcome: Ongoing     Problem: Pain:  Goal: Control of chronic pain  Description: Control of chronic pain  Outcome: Ongoing

## 2021-10-20 NOTE — CARE COORDINATION
Social Work-[facility] will admit today. [facility] to transport at [address] completed. Orders faxed. Nurse to call report to [phone]. Patient and son are agreeable with dc plans.  Nichole Willson

## 2021-10-20 NOTE — PLAN OF CARE
Problem: Skin Integrity:  Goal: Will show no infection signs and symptoms  Description: Will show no infection signs and symptoms  10/20/2021 1223 by Robin Escalante RN  Outcome: Ongoing  10/20/2021 0127 by Rama Melchor RN  Outcome: Ongoing  Note: Checked for incontinence every 2 hours and prn. Pericare as needed. Assisted to reposition off back frequently. Heels off bed with pillows. Goal: Absence of new skin breakdown  Description: Absence of new skin breakdown  10/20/2021 1223 by Robin Escalante RN  Outcome: Ongoing  10/20/2021 0127 by Rama Melchor RN  Outcome: Ongoing     Problem: Falls - Risk of:  Goal: Will remain free from falls  Description: Will remain free from falls  10/20/2021 1223 by Robin Escalante RN  Outcome: Ongoing  10/20/2021 0127 by Rama Melchor RN  Outcome: Ongoing  Note: Siderails up x 2  Hourly rounding  Call light in reach  Instructed to call for assist before attempting out of bed. Remains free from falls and accidental injury at this time   Floor free from obstacles  Bed is locked and in lowest position  Adequate lighting provided  Bed alarm on, Red Falling star and Stay with Me signs posted      Goal: Absence of physical injury  Description: Absence of physical injury  10/20/2021 1223 by Robin Escalante RN  Outcome: Ongoing  10/20/2021 0127 by Rama Melchor RN  Outcome: Ongoing     Problem: Pain:  Description: Pain management should include both nonpharmacologic and pharmacologic interventions.   Goal: Control of chronic pain  Description: Control of chronic pain  10/20/2021 1223 by Robin Escalante RN  Outcome: Ongoing  10/20/2021 0127 by Rama Melchor RN  Outcome: Ongoing

## 2021-10-21 ENCOUNTER — APPOINTMENT (OUTPATIENT)
Dept: CT IMAGING | Age: 56
DRG: 291 | End: 2021-10-21
Payer: MEDICARE

## 2021-10-21 ENCOUNTER — HOSPITAL ENCOUNTER (INPATIENT)
Age: 56
LOS: 7 days | Discharge: NURSING, REHAB OR LONG-TERM CARE | DRG: 291 | End: 2021-10-29
Attending: EMERGENCY MEDICINE | Admitting: STUDENT IN AN ORGANIZED HEALTH CARE EDUCATION/TRAINING PROGRAM
Payer: MEDICARE

## 2021-10-21 ENCOUNTER — APPOINTMENT (OUTPATIENT)
Dept: GENERAL RADIOLOGY | Age: 56
DRG: 291 | End: 2021-10-21
Payer: MEDICARE

## 2021-10-21 DIAGNOSIS — J18.9 PNEUMONIA OF BOTH LUNGS DUE TO INFECTIOUS ORGANISM, UNSPECIFIED PART OF LUNG: Primary | ICD-10-CM

## 2021-10-21 DIAGNOSIS — M54.50 CHRONIC LOW BACK PAIN, UNSPECIFIED BACK PAIN LATERALITY, UNSPECIFIED WHETHER SCIATICA PRESENT: ICD-10-CM

## 2021-10-21 DIAGNOSIS — E87.6 HYPOKALEMIA: ICD-10-CM

## 2021-10-21 DIAGNOSIS — F41.1 GENERALIZED ANXIETY DISORDER: ICD-10-CM

## 2021-10-21 DIAGNOSIS — G89.29 CHRONIC LOW BACK PAIN, UNSPECIFIED BACK PAIN LATERALITY, UNSPECIFIED WHETHER SCIATICA PRESENT: ICD-10-CM

## 2021-10-21 LAB
-: ABNORMAL
ABSOLUTE EOS #: 0.1 K/UL (ref 0–0.44)
ABSOLUTE IMMATURE GRANULOCYTE: 0.47 K/UL (ref 0–0.3)
ABSOLUTE LYMPH #: 0.89 K/UL (ref 1.1–3.7)
ABSOLUTE MONO #: 0.84 K/UL (ref 0.1–1.2)
ALBUMIN SERPL-MCNC: 3.1 G/DL (ref 3.5–5.2)
ALBUMIN/GLOBULIN RATIO: 0.7 (ref 1–2.5)
ALLEN TEST: ABNORMAL
ALLEN TEST: ABNORMAL
ALP BLD-CCNC: 252 U/L (ref 40–129)
ALT SERPL-CCNC: 20 U/L (ref 5–41)
AMMONIA: 34 UMOL/L (ref 16–60)
AMORPHOUS: ABNORMAL
ANION GAP SERPL CALCULATED.3IONS-SCNC: 12 MMOL/L (ref 9–17)
ANION GAP: 9 MMOL/L (ref 7–16)
AST SERPL-CCNC: 25 U/L
BACTERIA: ABNORMAL
BASOPHILS # BLD: 1 % (ref 0–2)
BASOPHILS ABSOLUTE: 0.05 K/UL (ref 0–0.2)
BILIRUB SERPL-MCNC: 0.63 MG/DL (ref 0.3–1.2)
BILIRUBIN URINE: NEGATIVE
BUN BLDV-MCNC: 13 MG/DL (ref 6–20)
BUN/CREAT BLD: ABNORMAL (ref 9–20)
CALCIUM SERPL-MCNC: 8.6 MG/DL (ref 8.6–10.4)
CARBOXYHEMOGLOBIN: 3 % (ref 0–5)
CASTS UA: ABNORMAL /LPF (ref 0–8)
CHLORIDE BLD-SCNC: 97 MMOL/L (ref 98–107)
CO2: 33 MMOL/L (ref 20–31)
COLOR: YELLOW
CREAT SERPL-MCNC: 1.08 MG/DL (ref 0.7–1.2)
CRYSTALS, UA: ABNORMAL /HPF
CULTURE: NORMAL
DIFFERENTIAL TYPE: ABNORMAL
EOSINOPHILS RELATIVE PERCENT: 1 % (ref 1–4)
EPITHELIAL CELLS UA: ABNORMAL /HPF (ref 0–5)
FIO2: ABNORMAL
FIO2: ABNORMAL
GFR AFRICAN AMERICAN: >60 ML/MIN
GFR NON-AFRICAN AMERICAN: >60 ML/MIN
GFR NON-AFRICAN AMERICAN: >60 ML/MIN
GFR SERPL CREATININE-BSD FRML MDRD: >60 ML/MIN
GFR SERPL CREATININE-BSD FRML MDRD: ABNORMAL ML/MIN/{1.73_M2}
GFR SERPL CREATININE-BSD FRML MDRD: ABNORMAL ML/MIN/{1.73_M2}
GFR SERPL CREATININE-BSD FRML MDRD: NORMAL ML/MIN/{1.73_M2}
GLUCOSE BLD-MCNC: 124 MG/DL (ref 70–99)
GLUCOSE BLD-MCNC: 134 MG/DL (ref 74–100)
GLUCOSE URINE: NEGATIVE
HCO3 VENOUS: 37.3 MMOL/L (ref 24–30)
HCO3 VENOUS: 44.5 MMOL/L (ref 22–29)
HCT VFR BLD CALC: 30.6 % (ref 40.7–50.3)
HEMOGLOBIN: 9.2 G/DL (ref 13–17)
IMMATURE GRANULOCYTES: 5 %
KETONES, URINE: NEGATIVE
LACTIC ACID, SEPSIS WHOLE BLOOD: 1.6 MMOL/L (ref 0.5–1.9)
LACTIC ACID, SEPSIS: NORMAL MMOL/L (ref 0.5–1.9)
LEUKOCYTE ESTERASE, URINE: ABNORMAL
LYMPHOCYTES # BLD: 9 % (ref 24–43)
Lab: NORMAL
MCH RBC QN AUTO: 24.9 PG (ref 25.2–33.5)
MCHC RBC AUTO-ENTMCNC: 30.1 G/DL (ref 28.4–34.8)
MCV RBC AUTO: 82.7 FL (ref 82.6–102.9)
METHEMOGLOBIN: ABNORMAL % (ref 0–1.5)
MODE: ABNORMAL
MODE: ABNORMAL
MONOCYTES # BLD: 8 % (ref 3–12)
MUCUS: ABNORMAL
NEGATIVE BASE EXCESS, VEN: ABNORMAL (ref 0–2)
NEGATIVE BASE EXCESS, VEN: ABNORMAL MMOL/L (ref 0–2)
NITRITE, URINE: NEGATIVE
NOTIFICATION TIME: ABNORMAL
NOTIFICATION: ABNORMAL
NRBC AUTOMATED: 0 PER 100 WBC
O2 DEVICE/FLOW/%: ABNORMAL
O2 DEVICE/FLOW/%: ABNORMAL
O2 SAT, VEN: 87 % (ref 60–85)
O2 SAT, VEN: 98.8 % (ref 60–85)
OTHER OBSERVATIONS UA: ABNORMAL
OXYHEMOGLOBIN: ABNORMAL % (ref 95–98)
PATIENT TEMP: 37
PATIENT TEMP: ABNORMAL
PCO2, VEN, TEMP ADJ: ABNORMAL MMHG (ref 39–55)
PCO2, VEN: 56.9 MM HG (ref 41–51)
PCO2, VEN: 58.3 (ref 39–55)
PDW BLD-RTO: 15.5 % (ref 11.8–14.4)
PEEP/CPAP: ABNORMAL
PH UA: 8 (ref 5–8)
PH VENOUS: 7.42 (ref 7.32–7.42)
PH VENOUS: 7.5 (ref 7.32–7.43)
PH, VEN, TEMP ADJ: ABNORMAL (ref 7.32–7.42)
PLATELET # BLD: 173 K/UL (ref 138–453)
PLATELET ESTIMATE: ABNORMAL
PMV BLD AUTO: 10.4 FL (ref 8.1–13.5)
PO2, VEN, TEMP ADJ: ABNORMAL MMHG (ref 30–50)
PO2, VEN: 128 (ref 30–50)
PO2, VEN: 49.8 MM HG (ref 30–50)
POC BUN: 15 MG/DL (ref 8–26)
POC CHLORIDE: 93 MMOL/L (ref 98–107)
POC CREATININE: 0.81 MG/DL (ref 0.51–1.19)
POC HEMATOCRIT: 32 % (ref 41–53)
POC HEMOGLOBIN: 11 G/DL (ref 13.5–17.5)
POC IONIZED CALCIUM: 1.14 MMOL/L (ref 1.15–1.33)
POC LACTIC ACID: 1.16 MMOL/L (ref 0.56–1.39)
POC PCO2 TEMP: ABNORMAL MM HG
POC PH TEMP: ABNORMAL
POC PO2 TEMP: ABNORMAL MM HG
POC POTASSIUM: 3 MMOL/L (ref 3.5–4.5)
POC SODIUM: 145 MMOL/L (ref 138–146)
POC TCO2: 44 MMOL/L (ref 22–30)
POSITIVE BASE EXCESS, VEN: 11.5 MMOL/L (ref 0–2)
POSITIVE BASE EXCESS, VEN: 19 (ref 0–3)
POTASSIUM SERPL-SCNC: 3.1 MMOL/L (ref 3.7–5.3)
PROCALCITONIN: 2.33 NG/ML
PROTEIN UA: NEGATIVE
PSV: ABNORMAL
PT. POSITION: ABNORMAL
RBC # BLD: 3.7 M/UL (ref 4.21–5.77)
RBC # BLD: ABNORMAL 10*6/UL
RBC UA: ABNORMAL /HPF (ref 0–4)
RENAL EPITHELIAL, UA: ABNORMAL /HPF
RESPIRATORY RATE: ABNORMAL
SAMPLE SITE: ABNORMAL
SAMPLE SITE: ABNORMAL
SARS-COV-2, RAPID: NOT DETECTED
SEG NEUTROPHILS: 76 % (ref 36–65)
SEGMENTED NEUTROPHILS ABSOLUTE COUNT: 7.77 K/UL (ref 1.5–8.1)
SET RATE: ABNORMAL
SODIUM BLD-SCNC: 142 MMOL/L (ref 135–144)
SPECIFIC GRAVITY UA: 1.01 (ref 1–1.03)
SPECIMEN DESCRIPTION: NORMAL
SPECIMEN DESCRIPTION: NORMAL
TEXT FOR RESPIRATORY: ABNORMAL
THYROXINE, FREE: 1.02 NG/DL (ref 0.93–1.7)
TOTAL CO2, VENOUS: ABNORMAL MMOL/L (ref 23–30)
TOTAL HB: ABNORMAL G/DL (ref 12–16)
TOTAL PROTEIN: 7.4 G/DL (ref 6.4–8.3)
TOTAL RATE: ABNORMAL
TRICHOMONAS: ABNORMAL
TSH SERPL DL<=0.05 MIU/L-ACNC: 16.83 MIU/L (ref 0.3–5)
TURBIDITY: CLEAR
URINE HGB: ABNORMAL
UROBILINOGEN, URINE: NORMAL
VT: ABNORMAL
WBC # BLD: 10.1 K/UL (ref 3.5–11.3)
WBC # BLD: ABNORMAL 10*3/UL
WBC UA: ABNORMAL /HPF (ref 0–5)
YEAST: ABNORMAL

## 2021-10-21 PROCEDURE — 71260 CT THORAX DX C+: CPT

## 2021-10-21 PROCEDURE — 96374 THER/PROPH/DIAG INJ IV PUSH: CPT

## 2021-10-21 PROCEDURE — 84439 ASSAY OF FREE THYROXINE: CPT

## 2021-10-21 PROCEDURE — 82565 ASSAY OF CREATININE: CPT

## 2021-10-21 PROCEDURE — 81001 URINALYSIS AUTO W/SCOPE: CPT

## 2021-10-21 PROCEDURE — 82330 ASSAY OF CALCIUM: CPT

## 2021-10-21 PROCEDURE — 99285 EMERGENCY DEPT VISIT HI MDM: CPT

## 2021-10-21 PROCEDURE — 74177 CT ABD & PELVIS W/CONTRAST: CPT

## 2021-10-21 PROCEDURE — 84145 PROCALCITONIN (PCT): CPT

## 2021-10-21 PROCEDURE — 2580000003 HC RX 258: Performed by: STUDENT IN AN ORGANIZED HEALTH CARE EDUCATION/TRAINING PROGRAM

## 2021-10-21 PROCEDURE — 83605 ASSAY OF LACTIC ACID: CPT

## 2021-10-21 PROCEDURE — 82805 BLOOD GASES W/O2 SATURATION: CPT

## 2021-10-21 PROCEDURE — 84443 ASSAY THYROID STIM HORMONE: CPT

## 2021-10-21 PROCEDURE — 82803 BLOOD GASES ANY COMBINATION: CPT

## 2021-10-21 PROCEDURE — 80053 COMPREHEN METABOLIC PANEL: CPT

## 2021-10-21 PROCEDURE — 82947 ASSAY GLUCOSE BLOOD QUANT: CPT

## 2021-10-21 PROCEDURE — 87635 SARS-COV-2 COVID-19 AMP PRB: CPT

## 2021-10-21 PROCEDURE — 70450 CT HEAD/BRAIN W/O DYE: CPT

## 2021-10-21 PROCEDURE — 87040 BLOOD CULTURE FOR BACTERIA: CPT

## 2021-10-21 PROCEDURE — 87086 URINE CULTURE/COLONY COUNT: CPT

## 2021-10-21 PROCEDURE — 93005 ELECTROCARDIOGRAM TRACING: CPT | Performed by: STUDENT IN AN ORGANIZED HEALTH CARE EDUCATION/TRAINING PROGRAM

## 2021-10-21 PROCEDURE — 84484 ASSAY OF TROPONIN QUANT: CPT

## 2021-10-21 PROCEDURE — 85025 COMPLETE CBC W/AUTO DIFF WBC: CPT

## 2021-10-21 PROCEDURE — 85014 HEMATOCRIT: CPT

## 2021-10-21 PROCEDURE — 82140 ASSAY OF AMMONIA: CPT

## 2021-10-21 PROCEDURE — 84520 ASSAY OF UREA NITROGEN: CPT

## 2021-10-21 PROCEDURE — 36415 COLL VENOUS BLD VENIPUNCTURE: CPT

## 2021-10-21 PROCEDURE — 6360000002 HC RX W HCPCS: Performed by: STUDENT IN AN ORGANIZED HEALTH CARE EDUCATION/TRAINING PROGRAM

## 2021-10-21 PROCEDURE — 71045 X-RAY EXAM CHEST 1 VIEW: CPT

## 2021-10-21 PROCEDURE — 80051 ELECTROLYTE PANEL: CPT

## 2021-10-21 RX ADMIN — VANCOMYCIN HYDROCHLORIDE 2500 MG: 1 INJECTION, POWDER, LYOPHILIZED, FOR SOLUTION INTRAVENOUS at 22:34

## 2021-10-21 RX ADMIN — PIPERACILLIN AND TAZOBACTAM 4500 MG: 4; .5 INJECTION, POWDER, LYOPHILIZED, FOR SOLUTION INTRAVENOUS; PARENTERAL at 22:13

## 2021-10-22 PROBLEM — J18.9 PNEUMONIA: Status: ACTIVE | Noted: 2021-10-22

## 2021-10-22 LAB
ACTION: NORMAL
ALLEN TEST: POSITIVE
CULTURE: NO GROWTH
DATE AND TIME: NORMAL
EKG ATRIAL RATE: 69 BPM
EKG P AXIS: 50 DEGREES
EKG P-R INTERVAL: 144 MS
EKG Q-T INTERVAL: 424 MS
EKG QRS DURATION: 114 MS
EKG QTC CALCULATION (BAZETT): 454 MS
EKG R AXIS: -29 DEGREES
EKG T AXIS: 41 DEGREES
EKG VENTRICULAR RATE: 69 BPM
FIO2: ABNORMAL
GLUCOSE BLD-MCNC: 112 MG/DL (ref 75–110)
GLUCOSE BLD-MCNC: 115 MG/DL (ref 75–110)
GLUCOSE BLD-MCNC: 121 MG/DL (ref 75–110)
GLUCOSE BLD-MCNC: 129 MG/DL (ref 75–110)
GLUCOSE BLD-MCNC: 99 MG/DL (ref 75–110)
LACTIC ACID, SEPSIS WHOLE BLOOD: 1.3 MMOL/L (ref 0.5–1.9)
LACTIC ACID, SEPSIS: NORMAL MMOL/L (ref 0.5–1.9)
Lab: NORMAL
MODE: ABNORMAL
NEGATIVE BASE EXCESS, ART: ABNORMAL (ref 0–2)
NOTIFY: NORMAL
O2 DEVICE/FLOW/%: ABNORMAL
PATIENT TEMP: ABNORMAL
POC HCO3: 41.1 MMOL/L (ref 21–28)
POC O2 SATURATION: 88 % (ref 94–98)
POC PCO2 TEMP: ABNORMAL MM HG
POC PCO2: 66.6 MM HG (ref 35–48)
POC PH TEMP: ABNORMAL
POC PH: 7.4 (ref 7.35–7.45)
POC PO2 TEMP: ABNORMAL MM HG
POC PO2: 57.8 MM HG (ref 83–108)
POSITIVE BASE EXCESS, ART: 14 (ref 0–3)
READ BACK: YES
SAMPLE SITE: ABNORMAL
SPECIMEN DESCRIPTION: NORMAL
TCO2 (CALC), ART: ABNORMAL MMOL/L (ref 22–29)
TROPONIN INTERP: ABNORMAL
TROPONIN T: ABNORMAL NG/ML
TROPONIN, HIGH SENSITIVITY: 54 NG/L (ref 0–22)
TROPONIN, HIGH SENSITIVITY: 57 NG/L (ref 0–22)
TROPONIN, HIGH SENSITIVITY: 59 NG/L (ref 0–22)

## 2021-10-22 PROCEDURE — 6360000004 HC RX CONTRAST MEDICATION: Performed by: STUDENT IN AN ORGANIZED HEALTH CARE EDUCATION/TRAINING PROGRAM

## 2021-10-22 PROCEDURE — 94640 AIRWAY INHALATION TREATMENT: CPT

## 2021-10-22 PROCEDURE — 6360000002 HC RX W HCPCS: Performed by: STUDENT IN AN ORGANIZED HEALTH CARE EDUCATION/TRAINING PROGRAM

## 2021-10-22 PROCEDURE — 97530 THERAPEUTIC ACTIVITIES: CPT

## 2021-10-22 PROCEDURE — 82803 BLOOD GASES ANY COMBINATION: CPT

## 2021-10-22 PROCEDURE — 2060000000 HC ICU INTERMEDIATE R&B

## 2021-10-22 PROCEDURE — 2580000003 HC RX 258: Performed by: NURSE PRACTITIONER

## 2021-10-22 PROCEDURE — 6370000000 HC RX 637 (ALT 250 FOR IP): Performed by: NURSE PRACTITIONER

## 2021-10-22 PROCEDURE — 99222 1ST HOSP IP/OBS MODERATE 55: CPT | Performed by: INTERNAL MEDICINE

## 2021-10-22 PROCEDURE — 94761 N-INVAS EAR/PLS OXIMETRY MLT: CPT

## 2021-10-22 PROCEDURE — 6370000000 HC RX 637 (ALT 250 FOR IP): Performed by: STUDENT IN AN ORGANIZED HEALTH CARE EDUCATION/TRAINING PROGRAM

## 2021-10-22 PROCEDURE — 83605 ASSAY OF LACTIC ACID: CPT

## 2021-10-22 PROCEDURE — 97167 OT EVAL HIGH COMPLEX 60 MIN: CPT

## 2021-10-22 PROCEDURE — 6360000002 HC RX W HCPCS: Performed by: NURSE PRACTITIONER

## 2021-10-22 PROCEDURE — 99213 OFFICE O/P EST LOW 20 MIN: CPT

## 2021-10-22 PROCEDURE — 82947 ASSAY GLUCOSE BLOOD QUANT: CPT

## 2021-10-22 PROCEDURE — 97535 SELF CARE MNGMENT TRAINING: CPT

## 2021-10-22 PROCEDURE — 84484 ASSAY OF TROPONIN QUANT: CPT

## 2021-10-22 PROCEDURE — 36600 WITHDRAWAL OF ARTERIAL BLOOD: CPT

## 2021-10-22 PROCEDURE — 99223 1ST HOSP IP/OBS HIGH 75: CPT | Performed by: STUDENT IN AN ORGANIZED HEALTH CARE EDUCATION/TRAINING PROGRAM

## 2021-10-22 PROCEDURE — 2700000000 HC OXYGEN THERAPY PER DAY

## 2021-10-22 RX ORDER — PREGABALIN 75 MG/1
150 CAPSULE ORAL 2 TIMES DAILY
Status: CANCELLED | OUTPATIENT
Start: 2021-10-22

## 2021-10-22 RX ORDER — VENLAFAXINE HYDROCHLORIDE 150 MG/1
150 CAPSULE, EXTENDED RELEASE ORAL DAILY
Status: CANCELLED | OUTPATIENT
Start: 2021-10-22

## 2021-10-22 RX ORDER — NADOLOL 20 MG/1
40 TABLET ORAL DAILY
Status: CANCELLED | OUTPATIENT
Start: 2021-10-22

## 2021-10-22 RX ORDER — QUETIAPINE FUMARATE 25 MG/1
50 TABLET, FILM COATED ORAL NIGHTLY
Status: DISCONTINUED | OUTPATIENT
Start: 2021-10-22 | End: 2021-10-23

## 2021-10-22 RX ORDER — LEVOTHYROXINE SODIUM 175 UG/1
175 TABLET ORAL DAILY
Status: CANCELLED | OUTPATIENT
Start: 2021-10-22

## 2021-10-22 RX ORDER — ONDANSETRON 4 MG/1
4 TABLET, ORALLY DISINTEGRATING ORAL EVERY 8 HOURS PRN
Status: DISCONTINUED | OUTPATIENT
Start: 2021-10-22 | End: 2021-10-29 | Stop reason: HOSPADM

## 2021-10-22 RX ORDER — ALBUTEROL SULFATE 90 UG/1
2 AEROSOL, METERED RESPIRATORY (INHALATION) EVERY 6 HOURS PRN
Status: DISCONTINUED | OUTPATIENT
Start: 2021-10-22 | End: 2021-10-29 | Stop reason: HOSPADM

## 2021-10-22 RX ORDER — POTASSIUM CHLORIDE 20 MEQ/1
40 TABLET, EXTENDED RELEASE ORAL ONCE
Status: COMPLETED | OUTPATIENT
Start: 2021-10-22 | End: 2021-10-22

## 2021-10-22 RX ORDER — FUROSEMIDE 10 MG/ML
40 INJECTION INTRAMUSCULAR; INTRAVENOUS ONCE
Status: COMPLETED | OUTPATIENT
Start: 2021-10-22 | End: 2021-10-22

## 2021-10-22 RX ORDER — TAMSULOSIN HYDROCHLORIDE 0.4 MG/1
0.4 CAPSULE ORAL DAILY
Status: CANCELLED | OUTPATIENT
Start: 2021-10-22

## 2021-10-22 RX ORDER — PANTOPRAZOLE SODIUM 40 MG/1
40 TABLET, DELAYED RELEASE ORAL
Status: CANCELLED | OUTPATIENT
Start: 2021-10-22

## 2021-10-22 RX ORDER — DEXTROSE MONOHYDRATE 25 G/50ML
12.5 INJECTION, SOLUTION INTRAVENOUS PRN
Status: DISCONTINUED | OUTPATIENT
Start: 2021-10-22 | End: 2021-10-29 | Stop reason: HOSPADM

## 2021-10-22 RX ORDER — OXYCODONE HYDROCHLORIDE AND ACETAMINOPHEN 5; 325 MG/1; MG/1
1 TABLET ORAL ONCE
Status: COMPLETED | OUTPATIENT
Start: 2021-10-22 | End: 2021-10-22

## 2021-10-22 RX ORDER — SODIUM CHLORIDE 9 MG/ML
25 INJECTION, SOLUTION INTRAVENOUS PRN
Status: DISCONTINUED | OUTPATIENT
Start: 2021-10-22 | End: 2021-10-29 | Stop reason: HOSPADM

## 2021-10-22 RX ORDER — TAMSULOSIN HYDROCHLORIDE 0.4 MG/1
0.4 CAPSULE ORAL DAILY
Status: DISCONTINUED | OUTPATIENT
Start: 2021-10-22 | End: 2021-10-29 | Stop reason: HOSPADM

## 2021-10-22 RX ORDER — QUETIAPINE FUMARATE 50 MG/1
50 TABLET, EXTENDED RELEASE ORAL NIGHTLY
Status: CANCELLED | OUTPATIENT
Start: 2021-10-22

## 2021-10-22 RX ORDER — ASPIRIN 81 MG/1
81 TABLET ORAL DAILY
Status: CANCELLED | OUTPATIENT
Start: 2021-10-22

## 2021-10-22 RX ORDER — NICOTINE POLACRILEX 4 MG
15 LOZENGE BUCCAL PRN
Status: DISCONTINUED | OUTPATIENT
Start: 2021-10-22 | End: 2021-10-29 | Stop reason: HOSPADM

## 2021-10-22 RX ORDER — DULOXETIN HYDROCHLORIDE 30 MG/1
60 CAPSULE, DELAYED RELEASE ORAL EVERY MORNING
Status: DISCONTINUED | OUTPATIENT
Start: 2021-10-22 | End: 2021-10-29 | Stop reason: HOSPADM

## 2021-10-22 RX ORDER — VANCOMYCIN HYDROCHLORIDE 1 G/200ML
1000 INJECTION, SOLUTION INTRAVENOUS EVERY 12 HOURS
Status: DISCONTINUED | OUTPATIENT
Start: 2021-10-22 | End: 2021-10-23

## 2021-10-22 RX ORDER — ASPIRIN 81 MG/1
81 TABLET, CHEWABLE ORAL DAILY
Status: DISCONTINUED | OUTPATIENT
Start: 2021-10-22 | End: 2021-10-29 | Stop reason: HOSPADM

## 2021-10-22 RX ORDER — SODIUM CHLORIDE 0.9 % (FLUSH) 0.9 %
5-40 SYRINGE (ML) INJECTION EVERY 12 HOURS SCHEDULED
Status: DISCONTINUED | OUTPATIENT
Start: 2021-10-22 | End: 2021-10-29 | Stop reason: HOSPADM

## 2021-10-22 RX ORDER — ACETAMINOPHEN 325 MG/1
650 TABLET ORAL EVERY 6 HOURS PRN
Status: DISCONTINUED | OUTPATIENT
Start: 2021-10-22 | End: 2021-10-29 | Stop reason: HOSPADM

## 2021-10-22 RX ORDER — DULOXETIN HYDROCHLORIDE 30 MG/1
60 CAPSULE, DELAYED RELEASE ORAL EVERY MORNING
Status: CANCELLED | OUTPATIENT
Start: 2021-10-22

## 2021-10-22 RX ORDER — PREGABALIN 75 MG/1
150 CAPSULE ORAL 2 TIMES DAILY
Status: DISCONTINUED | OUTPATIENT
Start: 2021-10-22 | End: 2021-10-28

## 2021-10-22 RX ORDER — ALOGLIPTIN 12.5 MG/1
25 TABLET, FILM COATED ORAL DAILY
Status: CANCELLED | OUTPATIENT
Start: 2021-10-22

## 2021-10-22 RX ORDER — BUMETANIDE 1 MG/1
2 TABLET ORAL 2 TIMES DAILY
Status: CANCELLED | OUTPATIENT
Start: 2021-10-22

## 2021-10-22 RX ORDER — OXYCODONE HYDROCHLORIDE AND ACETAMINOPHEN 5; 325 MG/1; MG/1
1 TABLET ORAL EVERY 6 HOURS PRN
Status: DISCONTINUED | OUTPATIENT
Start: 2021-10-22 | End: 2021-10-23

## 2021-10-22 RX ORDER — SODIUM CHLORIDE 0.9 % (FLUSH) 0.9 %
10 SYRINGE (ML) INJECTION PRN
Status: DISCONTINUED | OUTPATIENT
Start: 2021-10-22 | End: 2021-10-29 | Stop reason: HOSPADM

## 2021-10-22 RX ORDER — ACETAMINOPHEN 650 MG/1
650 SUPPOSITORY RECTAL EVERY 6 HOURS PRN
Status: DISCONTINUED | OUTPATIENT
Start: 2021-10-22 | End: 2021-10-29 | Stop reason: HOSPADM

## 2021-10-22 RX ORDER — ONDANSETRON 2 MG/ML
4 INJECTION INTRAMUSCULAR; INTRAVENOUS EVERY 6 HOURS PRN
Status: DISCONTINUED | OUTPATIENT
Start: 2021-10-22 | End: 2021-10-29 | Stop reason: HOSPADM

## 2021-10-22 RX ORDER — ALBUTEROL SULFATE 2.5 MG/3ML
2.5 SOLUTION RESPIRATORY (INHALATION)
Status: DISCONTINUED | OUTPATIENT
Start: 2021-10-22 | End: 2021-10-24

## 2021-10-22 RX ORDER — LEVOTHYROXINE SODIUM 175 UG/1
175 TABLET ORAL DAILY
Status: DISCONTINUED | OUTPATIENT
Start: 2021-10-22 | End: 2021-10-29 | Stop reason: HOSPADM

## 2021-10-22 RX ORDER — PANTOPRAZOLE SODIUM 40 MG/1
40 TABLET, DELAYED RELEASE ORAL
Status: DISCONTINUED | OUTPATIENT
Start: 2021-10-22 | End: 2021-10-29 | Stop reason: HOSPADM

## 2021-10-22 RX ORDER — IPRATROPIUM BROMIDE AND ALBUTEROL SULFATE 2.5; .5 MG/3ML; MG/3ML
1 SOLUTION RESPIRATORY (INHALATION)
Status: DISCONTINUED | OUTPATIENT
Start: 2021-10-22 | End: 2021-10-24

## 2021-10-22 RX ORDER — OXYBUTYNIN CHLORIDE 5 MG/1
5 TABLET ORAL 2 TIMES DAILY
Status: CANCELLED | OUTPATIENT
Start: 2021-10-22

## 2021-10-22 RX ORDER — DEXTROSE MONOHYDRATE 50 MG/ML
100 INJECTION, SOLUTION INTRAVENOUS PRN
Status: DISCONTINUED | OUTPATIENT
Start: 2021-10-22 | End: 2021-10-29 | Stop reason: HOSPADM

## 2021-10-22 RX ORDER — POTASSIUM CHLORIDE 20 MEQ/1
25 TABLET, EXTENDED RELEASE ORAL ONCE
Status: COMPLETED | OUTPATIENT
Start: 2021-10-22 | End: 2021-10-22

## 2021-10-22 RX ORDER — NADOLOL 20 MG/1
40 TABLET ORAL DAILY
Status: DISCONTINUED | OUTPATIENT
Start: 2021-10-22 | End: 2021-10-22

## 2021-10-22 RX ADMIN — ASPIRIN 81 MG: 81 TABLET, CHEWABLE ORAL at 09:34

## 2021-10-22 RX ADMIN — PIPERACILLIN AND TAZOBACTAM 3375 MG: 3; .375 INJECTION, POWDER, FOR SOLUTION INTRAVENOUS at 05:46

## 2021-10-22 RX ADMIN — VANCOMYCIN HYDROCHLORIDE 1000 MG: 1 INJECTION, SOLUTION INTRAVENOUS at 11:29

## 2021-10-22 RX ADMIN — METOPROLOL TARTRATE 25 MG: 25 TABLET ORAL at 20:28

## 2021-10-22 RX ADMIN — OXYCODONE HYDROCHLORIDE AND ACETAMINOPHEN 1 TABLET: 5; 325 TABLET ORAL at 09:33

## 2021-10-22 RX ADMIN — PIPERACILLIN AND TAZOBACTAM 3375 MG: 3; .375 INJECTION, POWDER, FOR SOLUTION INTRAVENOUS at 20:25

## 2021-10-22 RX ADMIN — SODIUM CHLORIDE, PRESERVATIVE FREE 10 ML: 5 INJECTION INTRAVENOUS at 20:29

## 2021-10-22 RX ADMIN — IPRATROPIUM BROMIDE AND ALBUTEROL SULFATE 1 AMPULE: .5; 2.5 SOLUTION RESPIRATORY (INHALATION) at 11:40

## 2021-10-22 RX ADMIN — PREGABALIN 150 MG: 75 CAPSULE ORAL at 20:28

## 2021-10-22 RX ADMIN — POTASSIUM CHLORIDE 30 MEQ: 1500 TABLET, EXTENDED RELEASE ORAL at 01:25

## 2021-10-22 RX ADMIN — PREGABALIN 150 MG: 75 CAPSULE ORAL at 09:33

## 2021-10-22 RX ADMIN — METOPROLOL TARTRATE 25 MG: 25 TABLET ORAL at 09:33

## 2021-10-22 RX ADMIN — ACETAMINOPHEN 650 MG: 325 TABLET ORAL at 06:05

## 2021-10-22 RX ADMIN — PIPERACILLIN AND TAZOBACTAM 3375 MG: 3; .375 INJECTION, POWDER, FOR SOLUTION INTRAVENOUS at 12:45

## 2021-10-22 RX ADMIN — IOPAMIDOL 85 ML: 755 INJECTION, SOLUTION INTRAVENOUS at 00:08

## 2021-10-22 RX ADMIN — OXYCODONE HYDROCHLORIDE AND ACETAMINOPHEN 1 TABLET: 5; 325 TABLET ORAL at 20:28

## 2021-10-22 RX ADMIN — PANTOPRAZOLE SODIUM 40 MG: 40 TABLET, DELAYED RELEASE ORAL at 09:33

## 2021-10-22 RX ADMIN — IPRATROPIUM BROMIDE AND ALBUTEROL SULFATE 1 AMPULE: .5; 2.5 SOLUTION RESPIRATORY (INHALATION) at 20:54

## 2021-10-22 RX ADMIN — ENOXAPARIN SODIUM 40 MG: 40 INJECTION SUBCUTANEOUS at 09:33

## 2021-10-22 RX ADMIN — SODIUM CHLORIDE, PRESERVATIVE FREE 10 ML: 5 INJECTION INTRAVENOUS at 09:34

## 2021-10-22 RX ADMIN — OXYCODONE HYDROCHLORIDE AND ACETAMINOPHEN 1 TABLET: 5; 325 TABLET ORAL at 06:37

## 2021-10-22 RX ADMIN — LEVOTHYROXINE SODIUM 175 MCG: 175 TABLET ORAL at 09:33

## 2021-10-22 RX ADMIN — QUETIAPINE FUMARATE 50 MG: 25 TABLET ORAL at 20:28

## 2021-10-22 RX ADMIN — FUROSEMIDE 40 MG: 10 INJECTION, SOLUTION INTRAMUSCULAR; INTRAVENOUS at 09:34

## 2021-10-22 RX ADMIN — DULOXETINE HYDROCHLORIDE 60 MG: 30 CAPSULE, DELAYED RELEASE ORAL at 09:33

## 2021-10-22 RX ADMIN — TAMSULOSIN HYDROCHLORIDE 0.4 MG: 0.4 CAPSULE ORAL at 09:33

## 2021-10-22 RX ADMIN — VANCOMYCIN HYDROCHLORIDE 1000 MG: 1 INJECTION, SOLUTION INTRAVENOUS at 23:28

## 2021-10-22 RX ADMIN — POTASSIUM CHLORIDE 40 MEQ: 1500 TABLET, EXTENDED RELEASE ORAL at 09:34

## 2021-10-22 RX ADMIN — IPRATROPIUM BROMIDE AND ALBUTEROL SULFATE 1 AMPULE: .5; 2.5 SOLUTION RESPIRATORY (INHALATION) at 09:15

## 2021-10-22 ASSESSMENT — ENCOUNTER SYMPTOMS
SORE THROAT: 0
SHORTNESS OF BREATH: 1
DIARRHEA: 0
VOMITING: 0
RHINORRHEA: 0
CHEST TIGHTNESS: 0
BLOOD IN STOOL: 0
COUGH: 0
NAUSEA: 0
CONSTIPATION: 0
ABDOMINAL PAIN: 1
WHEEZING: 0

## 2021-10-22 ASSESSMENT — PAIN SCALES - GENERAL
PAINLEVEL_OUTOF10: 9
PAINLEVEL_OUTOF10: 0
PAINLEVEL_OUTOF10: 0
PAINLEVEL_OUTOF10: 2
PAINLEVEL_OUTOF10: 0
PAINLEVEL_OUTOF10: 0
PAINLEVEL_OUTOF10: 7
PAINLEVEL_OUTOF10: 7
PAINLEVEL_OUTOF10: 3

## 2021-10-22 ASSESSMENT — PAIN SCALES - WONG BAKER
WONGBAKER_NUMERICALRESPONSE: 2
WONGBAKER_NUMERICALRESPONSE: 0
WONGBAKER_NUMERICALRESPONSE: 2

## 2021-10-22 NOTE — ED PROVIDER NOTES
[facility]  ED  Emergency Department Encounter  EmergencyMedicine Resident     Pt Name:Ruben Anne  MRN: [id number]  [place] 1965  Date of evaluation: 10/21/21  PCP:  Clark Romero MD    This patient was evaluated in the Emergency Department for symptoms described in the history of present illness. The patient was evaluated in the context of the global COVID-19 pandemic, which necessitated consideration that the patient might be at risk for infection with the SARS-CoV-2 virus that causes COVID-19. Institutional protocols and algorithms that pertain to the evaluation of patients at risk for COVID-19 are in a state of rapid change based on information released by regulatory bodies including the CDC and federal and state organizations. These policies and algorithms were followed during the patient's care in the ED. CHIEF COMPLAINT       Chief Complaint   Patient presents with    Shortness of Breath    Fever       HISTORY OF PRESENT ILLNESS  (Location/Symptom, Timing/Onset, Context/Setting, Quality, Duration, Modifying Factors, Severity.)      John Ramires is a 61 y.o. male who presents with shortness of breath. Patient was recently admitted to [facility] for UTI, discharged yesterday to a an extended care facility on ciprofloxacin, taking as prescribed. Patient was found to be short of breath today, chest x-ray obtained, found to have a right upper lobe pneumonia. Patient was sent to the emergency department for evaluation. On arrival, patient is alert to self, not time or place, reporting shortness of breath, lower abdominal pain. Patient denies fevers, chills, cough, congestion, chest pain, diarrhea, constipation, lower extremity swelling or pain. Patient was found to have a fever prior to arrival.  Patient has history of BPH and urinary bladder neurogenic dysfunction, chronic Clemons catheter, CHF, DM, alcohol abuse, cirrhosis, morbid obesity, thyroid disease.     PAST MEDICAL / SURGICAL / SOCIAL / FAMILY HISTORY      has a past medical history of Anxiety and depression, Back pain, chronic, BPH (benign prostatic hyperplasia), CHF (congestive heart failure) ([facility] [address] Cirrhosis ([facility] [address] Diabetes mellitus ([facility] [address] GERD (gastroesophageal reflux disease), H/O cardiac catheterization, Hepatitis, Hiatal hernia, History of alcohol abuse, Hyperlipidemia, Hypertension, IBS (irritable bowel syndrome), Morbid obesity ([facility] [address] Neuropathy, On home oxygen therapy, Pancreatitis, Primary osteoarthritis of right knee, Sleep apnea, Thyroid disease, and Urinary bladder neurogenic dysfunction. has a past surgical history that includes Colonoscopy; Abdomen surgery; hernia repair; Endoscopy, colon, diagnostic; Upper gastrointestinal endoscopy (07/19/2017); pr egd transoral biopsy single/multiple (N/A, 7/19/2017); pr deep dissec foot infec,1 bursa (Bilateral, 8/22/2017); Cystoscopy (01/24/2018); pr cystourethroscopy (N/A, 1/24/2018); Incisional hernia repair (05/20/2018); ventral hernia repair (N/A, 5/20/2018); Cystocopy (02/20/2019); Cystoscopy (N/A, 2/20/2019); Upper gastrointestinal endoscopy (N/A, 3/14/2019); and Cystoscopy (N/A, 8/23/2019).       Social History     Socioeconomic History    Marital status:      Spouse name: Not on file    Number of children: Not on file    Years of education: Not on file    Highest education level: Not on file   Occupational History    Not on file   Tobacco Use    Smoking status: Never Smoker    Smokeless tobacco: Never Used   Vaping Use    Vaping Use: Never used   Substance and Sexual Activity    Alcohol use: No     Comment: quit drinking 2012    Drug use: No    Sexual activity: Not on file   Other Topics Concern    Not on file   Social History Narrative    Not on file     Social Determinants of Health     Financial Resource Strain:     Difficulty of Paying Living Expenses:    Food Insecurity:     Worried About Running Out of Food in the Last Year:     Ran Out of Food in the Last Year:    Transportation Needs:     Lack of Transportation (Medical):  Lack of Transportation (Non-Medical):    Physical Activity:     Days of Exercise per Week:     Minutes of Exercise per Session:    Stress:     Feeling of Stress :    Social Connections:     Frequency of Communication with Friends and Family:     Frequency of Social Gatherings with Friends and Family:     Attends [employer] Services:     Active Member of Clubs or Organizations:     Attends Club or Organization Meetings:     Marital Status:    Intimate Partner Violence:     Fear of Current or Ex-Partner:     Emotionally Abused:     Physically Abused:     Sexually Abused:        Family History   Adopted: Yes       Allergies:  No known allergies    Home Medications:  Prior to Admission medications    Medication Sig Start Date End Date Taking? Authorizing Provider   ciprofloxacin (CIPRO) 500 MG tablet Take 1 tablet by mouth every 12 hours for 10 days 10/18/21 10/28/21  Amaya Samayoa DO   insulin glargine (BASAGLAR KWIKPEN) 100 UNIT/ML injection pen Inject 76 Units into the skin 2 times daily    Historical Provider, MD   pregabalin (LYRICA) 150 MG capsule Take 150 mg by mouth 2 times daily. Historical Provider, MD   ibuprofen (ADVIL;MOTRIN) 600 MG tablet Take 600 mg by mouth every 6 hours as needed for Pain    Historical Provider, MD   SITagliptin (JANUVIA) 100 MG tablet Take 100 mg by mouth daily    Historical Provider, MD   glipiZIDE (GLUCOTROL) 10 MG tablet Take 10 mg by mouth 2 times daily (before meals)    Historical Provider, MD   metFORMIN (GLUCOPHAGE) 500 MG tablet Take 500 mg by mouth 2 times daily (with meals)    Historical Provider, MD   nystatin (MYCOSTATIN) 577915 UNIT/GM cream Apply topically 2 times daily as needed (to skin folds)    Historical Provider, MD   clonazePAM (KLONOPIN) 1 MG tablet Take 1 tablet by mouth 2 times daily as needed for Anxiety for up to 3 days.  1/14/21 1/17/21  Showkat Andrew Zamudio MD   oxyCODONE-acetaminophen (PERCOCET) 7.5-325 MG per tablet Take 1 tablet by mouth every 6 hours as needed for Pain. ; Dispense 30 1/5/21   Historical Provider, MD   QUEtiapine (SEROQUEL XR) 50 MG extended release tablet Take 50 mg by mouth nightly    Historical Provider, MD   triamcinolone (KENALOG) 0.025 % cream Apply topically 2 times daily as needed    Historical Provider, MD   diphenoxylate-atropine (LOMOTIL) 2.5-0.025 MG per tablet Take 1 tablet by mouth 4 times daily as needed for Diarrhea. Historical Provider, MD   fluocinonide (LIDEX) 0.05 % external solution Apply topically as needed (scalp itching)    Historical Provider, MD   ketoconazole (NIZORAL) 2 % shampoo Apply topically Twice a Week    Historical Provider, MD   albuterol sulfate HFA (PROAIR HFA) 108 (90 Base) MCG/ACT inhaler Inhale 2 puffs into the lungs every 6 hours as needed for Wheezing    Historical Provider, MD   tamsulosin (FLOMAX) 0.4 MG capsule Take 1 capsule by mouth daily 1/24/18   Lakeisha Duckworth MD   oxybutynin (DITROPAN) 5 MG tablet Take 1 tablet by mouth 2 times daily 12/12/17   Dulce Samayoa DO   bumetanide (BUMEX) 2 MG tablet Take 2 mg by mouth 2 times daily    Historical Provider, MD   aspirin 81 MG EC tablet Take 1 tablet by mouth daily 7/26/17   Kolby Rodriguez DO   nadolol (CORGARD) 40 MG tablet Take 40 mg by mouth daily    Historical Provider, MD   levothyroxine (SYNTHROID) 175 MCG tablet Take 175 mcg by mouth Daily     Historical Provider, MD   nystatin (MYCOSTATIN) 568318 UNIT/GM powder Apply topically 2 times daily as needed TO ABDOMINAL FOLDS, GROIN     Historical Provider, MD   esomeprazole (NEXIUM) 40 MG capsule Take 40 mg by mouth 2 times daily     Historical Provider, MD   DULoxetine (CYMBALTA) 60 MG capsule Take 60 mg by mouth every morning     Historical Provider, MD   venlafaxine (EFFEXOR-XR) 150 MG XR capsule Take 150 mg by mouth daily.     Historical Provider, MD       REVIEW OF SYSTEMS (2-9 systems for level 4, 10 or more for level 5)      Review of Systems   Constitutional: Positive for fever. Negative for chills and diaphoresis. HENT: Negative for congestion, rhinorrhea and sore throat. Eyes: Negative for visual disturbance. Respiratory: Positive for shortness of breath. Negative for cough, chest tightness and wheezing. Cardiovascular: Negative for chest pain, palpitations and leg swelling. Gastrointestinal: Positive for abdominal pain. Negative for blood in stool, constipation, diarrhea, nausea and vomiting. Genitourinary: Negative for hematuria, scrotal swelling and testicular pain. Musculoskeletal: Negative for neck pain and neck stiffness. Skin: Negative for pallor and rash. Neurological: Negative for dizziness, syncope, weakness, light-headedness and headaches. Psychiatric/Behavioral: Negative for confusion. PHYSICAL EXAM   (up to 7 for level 4, 8 or more for level 5)      INITIAL VITALS:   BP (!) 140/68   Pulse 70   Temp 100.7 °F (38.2 °C) (Oral)   Resp 17   SpO2 96%     Physical Exam  Constitutional:       General: He is not in acute distress. Appearance: He is well-developed. He is ill-appearing. He is not toxic-appearing or diaphoretic. Comments: Patient is alert and oriented to self and place, not to time, appears mildly short of breath, altered   HENT:      Head: Normocephalic and atraumatic. Right Ear: External ear normal.      Left Ear: External ear normal.      Nose: Nose normal. No congestion or rhinorrhea. Eyes:      General:         Right eye: No discharge. Left eye: No discharge. Extraocular Movements: Extraocular movements intact. Pupils: Pupils are equal, round, and reactive to light. Neck:      Vascular: No JVD. Trachea: No tracheal deviation. Cardiovascular:      Rate and Rhythm: Normal rate and regular rhythm. Pulses: Normal pulses. Heart sounds: Normal heart sounds. No murmur heard. No friction rub. No gallop. Pulmonary:      Effort: Pulmonary effort is normal. No respiratory distress. Breath sounds: Normal breath sounds. No stridor. No wheezing, rhonchi or rales. Chest:      Chest wall: No tenderness. Abdominal:      General: There is no distension. Palpations: Abdomen is soft. There is no mass. Tenderness: There is abdominal tenderness. There is no right CVA tenderness, left CVA tenderness or guarding. Comments: Patient has generalized abdominal tenderness, no overlying skin changes, no ecchymosis, abdomen soft   Genitourinary:     Testes: Normal.      Comments: Patient has no abnormality to the perineum, some minor skin breakdown to the right buttocks without signs of infection  Musculoskeletal:         General: No swelling, tenderness, deformity or signs of injury. Normal range of motion. Cervical back: Normal range of motion and neck supple. No rigidity or tenderness. Skin:     General: Skin is warm. Capillary Refill: Capillary refill takes less than 2 seconds. Neurological:      Mental Status: He is alert and oriented to person, place, and time. Cranial Nerves: No cranial nerve deficit. Sensory: No sensory deficit.       Comments: Patient is moving all extremities equally   Psychiatric:         Behavior: Behavior normal.         DIFFERENTIAL  DIAGNOSIS     PLAN (LABS / Rosaura Rory / EKG):  Orders Placed This Encounter   Procedures    Culture, Blood 1    Culture, Blood 1    COVID-19, Rapid    Culture, Urine    Sputum gram stain    Respiratory Culture    XR CHEST PORTABLE    CT CHEST PULMONARY EMBOLISM W CONTRAST    CT HEAD WO CONTRAST    CT ABDOMEN PELVIS W IV CONTRAST Additional Contrast? None    XR CHEST (2 VW)    CBC Auto Differential    Comprehensive Metabolic Panel    Lactate, Sepsis    Procalcitonin    Troponin    Urinalysis with Microscopic    Blood Gas, Venous    ELECTROLYTES PLUS    Hemoglobin and hematocrit, blood    CALCIUM, IONIC (POC)    AMMONIA    T4, Free    TSH without Reflex    Troponin    Basic Metabolic Panel w/ Reflex to MG    CBC    ADULT DIET;  Regular; 4 carb choices (60 gm/meal)    Vital signs per unit routine    Notify physician    Up as tolerated    Daily weights    Intake and output    Encourage deep breathing and coughing every two hours while awake    Place intermittent pneumatic compression device    HYPOGLYCEMIA TREATMENT: blood glucose less than 50 mg/dL and patient  ALERT and TOLERATING PO    HYPOGLYCEMIA TREATMENT: blood glucose less than 70 mg/dL and patient ALERT and TOLERATING PO    HYPOGLYCEMIA TREATMENT: blood glucose less than 70 mg/dL and patient NOT ALERT or NPO    Telemetry monitoring - continuous duration    Full Code    Inpatient consult to [address] [place] [address] St to Dose: Vancomycin; Pneumonia (HAP) Pharmacy to dose genny Dorman OT eval and treat    PT evaluation and treat    Initiate Oxygen Therapy Protocol    Pulse Oximetry Spot Check    Respiratory care evaluation only    Venous Blood Gas, POC    Creatinine W/GFR Point of Care    POCT urea (BUN)    Lactic Acid, POC    POCT Glucose    POCT Glucose    POCT Glucose    EKG 12 Lead    Insert/Change Clemons Catheter    PATIENT STATUS (FROM ED OR OR/PROCEDURAL) Inpatient       MEDICATIONS ORDERED:  Orders Placed This Encounter   Medications    piperacillin-tazobactam (ZOSYN) 4,500 mg in dextrose 5 % 100 mL IVPB (mini-bag)     Order Specific Question:   Antimicrobial Indications     Answer:   Pneumonia (HAP)    vancomycin (VANCOCIN) 2,500 mg in dextrose 5 % 500 mL IVPB     Order Specific Question:   Antimicrobial Indications     Answer:   Pneumonia (HAP)    iopamidol (ISOVUE-370) 76 % injection 85 mL    potassium chloride (KLOR-CON M) extended release tablet 30 mEq    sodium chloride flush 0.9 % injection 5-40 mL    sodium chloride flush 0.9 % injection 10 mL    0.9 % sodium chloride infusion    enoxaparin (LOVENOX) injection 40 mg    OR Linked Order Group     ondansetron (ZOFRAN-ODT) disintegrating tablet 4 mg     ondansetron (ZOFRAN) injection 4 mg    magnesium hydroxide (MILK OF MAGNESIA) 400 MG/5ML suspension 30 mL    OR Linked Order Group     acetaminophen (TYLENOL) tablet 650 mg     acetaminophen (TYLENOL) suppository 650 mg    albuterol (PROVENTIL) nebulizer solution 2.5 mg     Order Specific Question:   Initiate RT Bronchodilator Protocol     Answer: Yes    ipratropium-albuterol (DUONEB) nebulizer solution 1 ampule     Order Specific Question:   Initiate RT Bronchodilator Protocol     Answer: Yes    piperacillin-tazobactam (ZOSYN) 3,375 mg in dextrose 5 % 50 mL IVPB extended infusion (mini-bag)     Order Specific Question:   Antimicrobial Indications     Answer:   Pneumonia (HAP)    insulin lispro (HUMALOG) injection vial 0-6 Units    insulin lispro (HUMALOG) injection vial 0-3 Units    glucose (GLUTOSE) 40 % oral gel 15 g    dextrose 50 % IV solution    glucagon (rDNA) injection 1 mg    dextrose 5 % solution    vancomycin (VANCOCIN) intermittent dosing (placeholder)     Order Specific Question:   Antimicrobial Indications     Answer:   Pneumonia (HAP)    vancomycin (VANCOCIN) 1000 mg in dextrose 5% 200 mL IVPB     Order Specific Question:   Antimicrobial Indications     Answer:   Pneumonia (HAP)       DDX: Sepsis, pneumonia, UTI, intra-abdominal abnormality, hepatic encephalopathy, electrolyte abnormality, thyroid dysfunction    DIAGNOSTIC RESULTS / EMERGENCY DEPARTMENT COURSE / MDM   LAB RESULTS:  Results for orders placed or performed during the hospital encounter of 10/21/21   Culture, Blood 1    Specimen: Blood   Result Value Ref Range    Specimen Description . BLOOD     Special Requests  L AC 20 ML     Culture NO GROWTH 2 HOURS    Culture, Blood 1    Specimen: Blood   Result Value Ref Range    Specimen Description . BLOOD     Special Requests  R HAND 10 ML Culture NO GROWTH 2 HOURS    COVID-19, Rapid    Specimen: Nasopharyngeal Swab   Result Value Ref Range    Specimen Description . NASOPHARYNGEAL SWAB     SARS-CoV-2, Rapid Not Detected Not Detected   CBC Auto Differential   Result Value Ref Range    WBC 10.1 3.5 - 11.3 k/uL    RBC 3.70 (L) 4.21 - 5.77 m/uL    Hemoglobin 9.2 (L) 13.0 - 17.0 g/dL    Hematocrit 30.6 (L) 40.7 - 50.3 %    MCV 82.7 82.6 - 102.9 fL    MCH 24.9 (L) 25.2 - 33.5 pg    MCHC 30.1 28.4 - 34.8 g/dL    RDW 15.5 (H) 11.8 - 14.4 %    Platelets 328 831 - 665 k/uL    MPV 10.4 8.1 - 13.5 fL    NRBC Automated 0.0 0.0 per 100 WBC    Differential Type NOT REPORTED     Seg Neutrophils 76 (H) 36 - 65 %    Lymphocytes 9 (L) 24 - 43 %    Monocytes 8 3 - 12 %    Eosinophils % 1 1 - 4 %    Basophils 1 0 - 2 %    Immature Granulocytes 5 (H) 0 %    Segs Absolute 7.77 1.50 - 8.10 k/uL    Absolute Lymph # 0.89 (L) 1.10 - 3.70 k/uL    Absolute Mono # 0.84 0.10 - 1.20 k/uL    Absolute Eos # 0.10 0.00 - 0.44 k/uL    Basophils Absolute 0.05 0.00 - 0.20 k/uL    Absolute Immature Granulocyte 0.47 (H) 0.00 - 0.30 k/uL    WBC Morphology NOT REPORTED     RBC Morphology ANISOCYTOSIS PRESENT     Platelet Estimate NOT REPORTED    Comprehensive Metabolic Panel   Result Value Ref Range    Glucose 124 (H) 70 - 99 mg/dL    BUN 13 6 - 20 mg/dL    CREATININE 1.08 0.70 - 1.20 mg/dL    Bun/Cre Ratio NOT REPORTED 9 - 20    Calcium 8.6 8.6 - 10.4 mg/dL    Sodium 142 135 - 144 mmol/L    Potassium 3.1 (L) 3.7 - 5.3 mmol/L    Chloride 97 (L) 98 - 107 mmol/L    CO2 33 (H) 20 - 31 mmol/L    Anion Gap 12 9 - 17 mmol/L    Alkaline Phosphatase 252 (H) 40 - 129 U/L    ALT 20 5 - 41 U/L    AST 25 <40 U/L    Total Bilirubin 0.63 0.3 - 1.2 mg/dL    Total Protein 7.4 6.4 - 8.3 g/dL    Albumin 3.1 (L) 3.5 - 5.2 g/dL    Albumin/Globulin Ratio 0.7 (L) 1.0 - 2.5    GFR Non-African American >60 >60 mL/min    GFR African American >60 >60 mL/min    GFR Comment          GFR Staging NOT REPORTED    Lactate, Sepsis   Result Value Ref Range    Lactic Acid, Sepsis NOT REPORTED 0.5 - 1.9 mmol/L    Lactic Acid, Sepsis, Whole Blood 1.6 0.5 - 1.9 mmol/L   Lactate, Sepsis   Result Value Ref Range    Lactic Acid, Sepsis NOT REPORTED 0.5 - 1.9 mmol/L    Lactic Acid, Sepsis, Whole Blood 1.3 0.5 - 1.9 mmol/L   Procalcitonin   Result Value Ref Range    Procalcitonin 2.33 (H) <0.09 ng/mL   Troponin   Result Value Ref Range    Troponin, High Sensitivity 54 (HH) 0 - 22 ng/L    Troponin T NOT REPORTED <0.03 ng/mL    Troponin Interp NOT REPORTED    Troponin   Result Value Ref Range    Troponin, High Sensitivity 59 (HH) 0 - 22 ng/L    Troponin T NOT REPORTED <0.03 ng/mL    Troponin Interp NOT REPORTED    Urinalysis with Microscopic   Result Value Ref Range    Color, UA Yellow Yellow    Turbidity UA Clear Clear    Glucose, Ur NEGATIVE NEGATIVE    Bilirubin Urine NEGATIVE NEGATIVE    Ketones, Urine NEGATIVE NEGATIVE    Specific Gravity, UA 1.011 1.005 - 1.030    Urine Hgb SMALL (A) NEGATIVE    pH, UA 8.0 5.0 - 8.0    Protein, UA NEGATIVE NEGATIVE    Urobilinogen, Urine Normal Normal    Nitrite, Urine NEGATIVE NEGATIVE    Leukocyte Esterase, Urine TRACE (A) NEGATIVE    -          WBC, UA 2 TO 5 0 - 5 /HPF    RBC, UA 10 TO 20 0 - 4 /HPF    Casts UA NOT REPORTED 0 - 8 /LPF    Crystals, UA NOT REPORTED None /HPF    Epithelial Cells UA 0 TO 2 0 - 5 /HPF    Renal Epithelial, UA NOT REPORTED 0 /HPF    Bacteria, UA NOT REPORTED None    Mucus, UA NOT REPORTED None    Trichomonas, UA NOT REPORTED None    Amorphous, UA NOT REPORTED None    Other Observations UA NOT REPORTED NOT REQ.     Yeast, UA NOT REPORTED None   Blood Gas, Venous   Result Value Ref Range    pH, Andrae 7.422 (H) 7.320 - 7.420    pCO2, Andrae 58.3 (H) 39 - 55    pO2, Andrae 128.0 (H) 30 - 50    HCO3, Venous 37.3 (H) 24 - 30 mmol/L    Positive Base Excess, Andrae 11.5 (H) 0.0 - 2.0 mmol/L    Negative Base Excess, Andrae NOT REPORTED 0.0 - 2.0 mmol/L    O2 Sat, Andrae 98.8 (H) 60.0 - 85.0 %    Total Hb NOT REPORTED 12.0 - 16.0 g/dl    Oxyhemoglobin NOT REPORTED 95.0 - 98.0 %    Carboxyhemoglobin 3.0 0 - 5 %    Methemoglobin NOT REPORTED 0.0 - 1.5 %    Pt Temp 37.0     pH, Andrae, Temp Adj NOT REPORTED 7.320 - 7.420    pCO2, Andrae, Temp Adj NOT REPORTED 39 - 55 mmHg    pO2, Andrae, Temp Adj NOT REPORTED 30 - 50 mmHg    O2 Device/Flow/% NOT REPORTED     Respiratory Rate NOT REPORTED     Shay Test NOT REPORTED     Sample Site NOT REPORTED     Pt.  Position NOT REPORTED     Mode NOT REPORTED     Set Rate NOT REPORTED     Total Rate NOT REPORTED     VT NOT REPORTED     FIO2 INFORMATION NOT PROVIDED     Peep/Cpap NOT REPORTED     PSV NOT REPORTED     Text for Respiratory NOT REPORTED     NOTIFICATION NOT REPORTED     NOTIFICATION TIME NOT REPORTED    ELECTROLYTES PLUS   Result Value Ref Range    POC Sodium 145 138 - 146 mmol/L    POC Potassium 3.0 (L) 3.5 - 4.5 mmol/L    POC Chloride 93 (L) 98 - 107 mmol/L    POC TCO2 44 (HH) 22 - 30 mmol/L    Anion Gap 9 7 - 16 mmol/L   Hemoglobin and hematocrit, blood   Result Value Ref Range    POC Hemoglobin 11.0 (L) 13.5 - 17.5 g/dL    POC Hematocrit 32 (L) 41 - 53 %   CALCIUM, IONIC (POC)   Result Value Ref Range    POC Ionized Calcium 1.14 (L) 1.15 - 1.33 mmol/L   AMMONIA   Result Value Ref Range    Ammonia 34 16 - 60 umol/L   T4, Free   Result Value Ref Range    Thyroxine, Free 1.02 0.93 - 1.70 ng/dL   TSH without Reflex   Result Value Ref Range    TSH 16.83 (H) 0.30 - 5.00 mIU/L   Troponin   Result Value Ref Range    Troponin, High Sensitivity 57 (HH) 0 - 22 ng/L    Troponin T NOT REPORTED <0.03 ng/mL    Troponin Interp NOT REPORTED    Venous Blood Gas, POC   Result Value Ref Range    pH, Andrae 7.501 (H) 7.320 - 7.430    pCO2, Andrae 56.9 (H) 41.0 - 51.0 mm Hg    pO2, Andrae 49.8 30 - 50 mm Hg    HCO3, Venous 44.5 (H) 22.0 - 29.0 mmol/L    Total CO2, Venous NOT REPORTED 23.0 - 30.0 mmol/L    Negative Base Excess, Andrae NOT REPORTED 0.0 - 2.0    Positive Base Excess, Andrae 19 (H) 0.0 - 3.0    O2 Sat, Andrae 87 (H) 60.0 - 85.0 %    O2 Device/Flow/% NOT REPORTED     Shay Test NOT REPORTED     Sample Site NOT REPORTED     Mode NOT REPORTED     FIO2 NOT REPORTED     Pt Temp NOT REPORTED     POC pH Temp NOT REPORTED     POC pCO2 Temp NOT REPORTED mm Hg    POC pO2 Temp NOT REPORTED mm Hg   Creatinine W/GFR Point of Care   Result Value Ref Range    POC Creatinine 0.81 0.51 - 1.19 mg/dL    GFR Comment >60 >60 mL/min    GFR Non-African American >60 >60 mL/min    GFR Comment         POCT urea (BUN)   Result Value Ref Range    POC BUN 15 8 - 26 mg/dL   Lactic Acid, POC   Result Value Ref Range    POC Lactic Acid 1.16 0.56 - 1.39 mmol/L   POCT Glucose   Result Value Ref Range    POC Glucose 134 (H) 74 - 100 mg/dL       IMPRESSION: 59-year-old male with significant past medical history, recent admission for UTI, presenting with altered mental status, dyspnea, right upper lobe infiltrate on chest x-ray, febrile, concern for sepsis from pulmonary source, will obtain septic labs, procalcitonin, start patient on vancomycin and Zosyn as patient was recently hospitalized. Patient has significant cardiac history, will withhold IV fluids until lactate results. As patient is altered, will obtain CT head to evaluate for intracranial abnormality. Will obtain liver labs and ammonia to evaluate for hyperammonemia and worsening liver failure. Will obtain basic labs to evaluate for anemia, electrolyte abnormality. Will obtain VBG to evaluate for respiratory status. Will scan chest to evaluate for pulmonary embolism. Patient has some abdominal pain, unable to fully quantify, will obtain CT imaging. Physical exam found no evidence of peritoneal infection or skin infection.   Patient has history of thyroid dysfunction, will obtain TSH and T4.    RADIOLOGY:  CT HEAD WO CONTRAST    Result Date: 10/22/2021  EXAMINATION: CT OF THE HEAD WITHOUT CONTRAST  10/22/2021 12:10 am TECHNIQUE: CT of the head was performed without the administration of intravenous contrast. Dose modulation, iterative reconstruction, and/or weight based adjustment of the mA/kV was utilized to reduce the radiation dose to as low as reasonably achievable. COMPARISON: 11/06/2020 HISTORY: ORDERING SYSTEM PROVIDED HISTORY: AMS TECHNOLOGIST PROVIDED HISTORY: [facility] Decision Support Exception - unselect if not a suspected or confirmed emergency medical condition->Emergency Medical Condition (MA) Reason for Exam: AMS Acuity: Unknown Type of Exam: Unknown FINDINGS: BRAIN/VENTRICLES: There is no acute intracranial hemorrhage, mass effect or midline shift. No abnormal extra-axial fluid collection. The gray-white differentiation is maintained without evidence of an acute infarct. There is no evidence of hydrocephalus. ORBITS: The bilateral globes are intact. SINUSES: Mucoperiosteal thickening of the right sphenoid sinus is seen. The left sphenoid sinus is completely opacified. SOFT TISSUES/SKULL:  No acute abnormality of the visualized skull or soft tissues. No acute intracranial abnormality. MRI may be obtained if clinically indicated. CT ABDOMEN PELVIS W IV CONTRAST Additional Contrast? None    Result Date: 10/22/2021  EXAMINATION: CT OF THE ABDOMEN AND PELVIS WITH CONTRAST 10/21/2021 11:37 pm TECHNIQUE: CT of the abdomen and pelvis was performed with the administration of intravenous contrast. Multiplanar reformatted images are provided for review. Dose modulation, iterative reconstruction, and/or weight based adjustment of the mA/kV was utilized to reduce the radiation dose to as low as reasonably achievable. COMPARISON: 12/18/2019 HISTORY: ORDERING SYSTEM PROVIDED HISTORY: AMS, lower abdominal pain TECHNOLOGIST PROVIDED HISTORY: Please go through testicles AMS, lower abdominal pain Reason for Exam: AMS, lower abdominal pain Acuity: Unknown Type of Exam: Unknown FINDINGS: Lower Chest: Please see separately dictated chest CT for findings the diaphragm.   Small bilateral pleural effusions are again noted. A 2.8 cm left pectoral subcutaneous nodules again noted, increased in size compared to 2019 when it measured 2.2 cm. Organs: Cirrhotic configuration of the liver. The spleen is enlarged to 18 cm in craniocaudal dimension. The pancreas, kidneys and adrenal glands are without acute findings. The gallbladder is present without radiopaque cholelithiasis. GI/Bowel: No mechanical bowel obstruction. No evidence of appendicitis. Pelvis: Nonspecific presacral stranding. The prostate is diminutive. The urinary bladder contains a Clemons catheter and is nondistended. No pelvic adenopathy by size criteria. Small fat containing left inguinal hernia. Peritoneum/Retroperitoneum: Moderate atherosclerotic plaque. No ascites or pneumoperitoneum. Status post ventral hernia repair. Small fat containing left paramedian ventral hernia in left lower quadrant. Bones/Soft Tissues: No acute or aggressive osseous lesions. Small abdominal wall varices. Nonspecific presacral stranding. Otherwise no evidence of acute infectious or inflammatory process in the abdomen or pelvis. Cirrhosis with splenomegaly. XR CHEST PORTABLE    Result Date: 10/21/2021  EXAMINATION: ONE XRAY VIEW OF THE CHEST 10/21/2021 4:13 pm COMPARISON: October 15, 2021 HISTORY: ORDERING SYSTEM PROVIDED HISTORY: SOB TECHNOLOGIST PROVIDED HISTORY: SOB Reason for Exam: shortness of breath upright port FINDINGS: Marginal inspiration is present. Cardiomegaly is noted. Interstitial and alveolar opacities are present bilaterally, slightly worse compared to the prior study. Right pleural effusion is noted. Atelectatic changes present within the lung bases. Osseous structures are stable. No pneumothorax is noted. 1. Cardiomegaly 2. Interval worsening of the interstitial alveolar opacities bilaterally, differential considerations include worsening edema versus infection 3.  Small right pleural effusion     CT CHEST PULMONARY EMBOLISM W CONTRAST    Result Date: 10/22/2021  EXAMINATION: CTA OF THE CHEST 10/21/2021 11:37 pm TECHNIQUE: CTA of the chest was performed after the administration of intravenous contrast.  Multiplanar reformatted images are provided for review. MIP images are provided for review. Dose modulation, iterative reconstruction, and/or weight based adjustment of the mA/kV was utilized to reduce the radiation dose to as low as reasonably achievable. COMPARISON: May 4, 2016. HISTORY: ORDERING SYSTEM PROVIDED HISTORY: Dyspnea, low saturation TECHNOLOGIST PROVIDED HISTORY: Dyspnea, low saturation Decision Support Exception - unselect if not a suspected or confirmed emergency medical condition->Emergency Medical Condition (MA) Reason for Exam: Dyspnea, low saturation Acuity: Unknown Type of Exam: Unknown FINDINGS: Pulmonary Arteries: Suboptimal evaluation of the subsegmental and more peripheral pulmonary arteries due to motion artifact. Given this, no obvious acute pulmonary thromboembolic disease. Prominent main pulmonary artery measures 3.6 cm in diameter. Mediastinum: No evidence of mediastinal lymphadenopathy. The heart and pericardium demonstrate no acute abnormality. There is no acute abnormality of the thoracic aorta. Aortic valve leaflet calcifications. Lungs/pleura: Respiratory motion. Expiratory imaging. Multifocal bilateral heterogeneous opacities. Bilateral posterior lower lobe consolidations. Small bilateral pleural effusions. No pneumothorax. No endoluminal masslike lesion. Upper Abdomen: Limited images of the upper abdomen are unremarkable. Soft Tissues/Bones: Multilevel degenerative changes in the visualized spine. Chronic appearing mild multilevel vertebral body height loss in the thoracic spine. Diffusely heterogeneous marrow.   Round 2.5 cm nodule in the subcutaneous soft tissues posterior to the left nipple demonstrates simple fluid attenuation and may represent a benign cyst.     1. Suboptimal evaluation of the subsegmental and more peripheral pulmonary arteries due to motion artifact. Given this, no obvious acute pulmonary thromboembolic disease. Prominent main pulmonary artery suggests pulmonary hypertension. 2.  Multifocal bilateral heterogeneous pulmonary opacities. Findings may represent a combination of atelectasis and pulmonary edema. Multifocal pneumonia is an additional consideration. 3.  Small bilateral pleural effusions with associated lower lobe relaxation atelectasis. 4.  Round 2.5 cm nodule in the subcutaneous soft tissues posterior to the left nipple demonstrates simple fluid attenuation and may represent a benign cyst.  This could be further evaluated with focused ultrasound as warranted. EKG  EKG Interpretation    Interpreted by me    Rhythm: normal sinus   Rate: normal  Axis: Left axis  Ectopy: none  Conduction: Incomplete right bundle branch block, LVH  ST Segments: no acute change  T Waves: no acute change  Q Waves: none    Clinical Impression: no acute changes and normal EKG, similar to prior EKG    All EKG's are interpreted by the Emergency Department Physician who either signs or Co-signs this chart in the absence of a cardiologist.    EMERGENCY DEPARTMENT COURSE:  Patient came to emergency department, HPI and physical exam were conducted. All nursing notes were reviewed. EKG showed no acute changes, initial troponin 57, follow-up troponin 54, likely demand ischemia, will continue to trend troponins. Urinalysis found no evidence of continued infection. No leukocytosis, but procalcitonin is significantly elevated, will continue Zosyn and vancomycin for likely pneumonia plus recent hospital admission. Labs significant for hypokalemia, will replace. Lactate is not significantly elevated, no indication for IV fluids. As we are still critically low on blue tubes, will defer coags at this time.   CT imaging found no evidence of pulmonary embolism, was concerning for bilateral pneumonia. Will admit patient to [facility] for continued treatment of patient's pneumonia. PROCEDURES:      CONSULTS:  IP CONSULT TO HOSPITALIST  IP CONSULT TO PHARMACY    CRITICAL CARE:      FINAL IMPRESSION      1. Pneumonia of both lungs due to infectious organism, unspecified part of lung    2. Hypokalemia          DISPOSITION / PLAN     DISPOSITION Admitted 10/22/2021 01:02:22 AM      PATIENT REFERRED TO:  No follow-up provider specified.     DISCHARGE MEDICATIONS:  New Prescriptions    No medications on file       Monet Urias MD  Emergency Medicine Resident    (Please note that portions of thisnote were completed with a voice recognition program.  Efforts were made to edit the dictations but occasionally words are mis-transcribed.)        Monet Urias MD  Resident  10/22/21 2945

## 2021-10-22 NOTE — PROGRESS NOTES
Pharmacy Note  Vancomycin Consult    Jeni Pop is a 61 y.o. male started on Vancomycin for Pneumonia (HAP); consult received from Dr. Liz Harris to manage therapy. Also receiving the following antibiotics: Zosyn.     Patient Active Problem List   Diagnosis    Alcoholic cirrhosis of liver ([address] sober since 2013    Peripheral edema    Bipolar disorder ([address]    Type 2 diabetes mellitus with diabetic neuropathy, with long-term current use of insulin (HCC)    Essential hypertension, currently hypotensive    Dyslipidemia    Weakness    Hyponatremia    Metabolic encephalopathy    FABI (obstructive sleep apnea)    Primary osteoarthritis of right knee    Type 2 diabetes mellitus with diabetic neuropathy (HCC)    Acquired hypothyroidism    Urine retention    Anemia    Cellulitis of right lower extremity    Slow transit constipation    Constipation    Iron deficiency anemia due to chronic blood loss    Gastroesophageal reflux disease without esophagitis    LEONARDA (acute kidney injury) ([address]    Secondary esophageal varices without bleeding ([address]    Portal hypertension ([address]    Morbid obesity with BMI of 50.0-59.9, adult (HCC)    Venous stasis dermatitis of both lower extremities    Cellulitis of left lower extremity    Cellulitis of perineum    Epididymoorchitis    Abdominal pain    Multiple and open wound of lower limb    Scrotal edema    Retention, urine    SBO (small bowel obstruction) ([address]    Incisional hernia with obstruction but no gangrene    Chronic indwelling Clemons catheter    Anxiety and depression    Back pain, chronic    BPH (benign prostatic hyperplasia)    Diastolic congestive heart failure (HCC)    Cirrhosis ([address]    Diabetes mellitus ([address]    GERD (gastroesophageal reflux disease)    H/O cardiac catheterization    Hepatitis    Hiatal hernia    History of alcohol abuse    Hypertension    IBS (irritable bowel syndrome)    Morbid obesity ([address]    Neuropathy    On home oxygen therapy    Pancreatitis    Sleep apnea    Thyroid disease    Urinary bladder neurogenic dysfunction    Urinary tract infection associated with indwelling urethral catheter ([facility]    Delirium due to another medical condition    Musculoskeletal immobility    Pyocystis    Myoclonic jerking    Poisoning by insulin and oral hypoglycemic (antidiabetic) drugs, accidental (unintentional), initial encounter    Thrombocytopenia (HCC)    Hypotension    Klebsiella sepsis ([facility]    Septic shock (HCC)    Urinary tract infection without hematuria    Pneumonia     Allergies:  No known allergies     Temp max: 100.7F    Recent Labs     10/19/21  0846 10/19/21  0846 10/20/21  0633 10/21/21  2204 10/21/21  2213   BUN 17  --   --   --  13   CREATININE 0.80  --   --  0.81 1.08   WBC 7.8   < > 9.1  --  10.1    < > = values in this interval not displayed. Intake/Output Summary (Last 24 hours) at 10/22/2021 0134  Last data filed at 10/21/2021 2227  Gross per 24 hour   Intake 100 ml   Output    Net 100 ml     Culture Date      Source                       Results  See micro    Ht Readings from Last 1 Encounters:   10/14/21 5' 10\" (1.778 m)        Wt Readings from Last 1 Encounters:   10/20/21 (!) 400 lb (181.4 kg)       There is no height or weight on file to calculate BMI. Estimated Creatinine Clearance: 124 mL/min (based on SCr of 1.08 mg/dL). Goal Trough Level: 10-20 mcg/mL  Goal AUC Level: 400-600    Assessment/Plan:  Vancomycin with a one time loading dose of 2500 mg x1 given in ER, will follow with 1000mg IV every 12 hours per PK modeling predicts  and trough 11.8. Timing of trough level will be determined based on culture results, renal function, and clinical response. Thank you for the consult. Will continue to follow.   Alfredo Loyola  PharmD  [phone]

## 2021-10-22 NOTE — ED PROVIDER NOTES
[facility]   Emergency Department  Emergency Medicine Attending Sign-out     Care of John Ramires was assumed from previous attending Dr. Marylen Life and is being seen for Shortness of Breath and Fever  . The patient's initial evaluation and plan have been discussed with the prior provider who initially evaluated the patient. Attestation  I was available and discussed any additional care issues that arose and coordinated the management plans with the resident(s) caring for the patient during my duty period. Any areas of disagreement with resident's documentation of care or procedures are noted on the chart. I was personally present for the key portions of any/all procedures, during my duty period. I have documented in the chart those procedures where I was not present during the key portions. BRIEF PATIENT SUMMARY/MDM COURSE PER INITIAL PROVIDER:   RECENT VITALS:     Temp: 100.7 °F (38.2 °C),  Pulse: 70, Resp: 17, BP: (!) 140/68, SpO2: 96 %    This patient is a 61 y.o. Male with altered mental status and somnolence. Is from [facility]. Did have a recent UTI discharged on ciprofloxacin, but the symptoms started today. Came in septic. Started on Vanco and Zosyn. Admitted awaiting bed placement    DIAGNOSTICS/MEDICATIONS:     MEDICATIONS GIVEN:  ED Medication Orders (From admission, onward)    Start Ordered     Status Ordering Provider    10/22/21 0900 10/22/21 0109  sodium chloride flush 0.9 % injection 5-40 mL  2 times per day      Acknowledged CHIKA DE LEON    10/22/21 0900 10/22/21 0109  enoxaparin (LOVENOX) injection 40 mg  DAILY      Acknowledged CHIKA DE LEON    10/22/21 0800 10/22/21 0109  ipratropium-albuterol (DUONEB) nebulizer solution 1 ampule  EVERY 4 HOURS WHILE AWAKE     Question:  Initiate RT Bronchodilator Protocol  Answer:   Yes    Acknowledged CHIKA DE LEON    10/22/21 0800 10/22/21 0109  insulin lispro (HUMALOG) injection vial 0-6 Units  3 TIMES DAILY WITH MEALS Acknowledged CHIKA DE LEON    10/22/21 0430 10/22/21 0109  piperacillin-tazobactam (ZOSYN) 3,375 mg in dextrose 5 % 50 mL IVPB extended infusion (mini-bag)  EVERY 8 HOURS     Question:  Antimicrobial Indications  Answer:  Pneumonia (HAP)    Acknowledged CHIKA DE LEON    10/22/21 0115 10/22/21 0109  insulin lispro (HUMALOG) injection vial 0-3 Units  NIGHTLY      Last MAR action: Held - by Graciela Siddiqui on 10/22/21 at [id number]    10/22/21 0109 10/22/21 0109  ondansetron (ZOFRAN-ODT) disintegrating tablet 4 mg  EVERY 8 HOURS PRN      Acknowledged CHIKA DE LEON    10/22/21 0109 10/22/21 0109  ondansetron (ZOFRAN) injection 4 mg  EVERY 6 HOURS PRN      Acknowledged CHIKA DE LEON    10/22/21 0109 10/22/21 0109  acetaminophen (TYLENOL) tablet 650 mg  EVERY 6 HOURS PRN      Acknowledged CHIKA DE LEON    10/22/21 0109 10/22/21 0109  acetaminophen (TYLENOL) suppository 650 mg  EVERY 6 HOURS PRN      Acknowledged CHIKA DE LEON    10/22/21 0109 10/22/21 0109  sodium chloride flush 0.9 % injection 10 mL  PRN      Acknowledged CHIKA DE LEON    10/22/21 0109 10/22/21 0109  magnesium hydroxide (MILK OF MAGNESIA) 400 MG/5ML suspension 30 mL  DAILY PRN      Acknowledged CHIKA DE LEON    10/22/21 0109 10/22/21 0109  glucose (GLUTOSE) 40 % oral gel 15 g  PRN      Acknowledged CHIKA DE LEON    10/22/21 0109 10/22/21 0109  dextrose 50 % IV solution  PRN      Acknowledged CHIKA DE LEON    10/22/21 0109 10/22/21 0109  glucagon (rDNA) injection 1 mg  PRN      Acknowledged CHIKA DE LEON    10/22/21 0109 10/22/21 0109  dextrose 5 % solution  PRN      Acknowledged CHIKA DE LEON    10/22/21 0109 10/22/21 0109  0.9 % sodium chloride infusion  PRN      Acknowledged CHIKA DE LEON    10/22/21 0109 10/22/21 0109  albuterol (PROVENTIL) nebulizer solution 2.5 mg  EVERY 2 HOURS PRN     Question:  Initiate RT Bronchodilator Protocol  Answer:   Yes    Acknowledged CHIKA DE LEON    10/22/21 0045 10/22/21 0042  potassium chloride Maura ESTES) extended release tablet 30 mEq  ONCE      Last MAR action: Given - by Lianne Lane on 10/22/21 at [address]    10/21/21 2337 10/21/21 2337  iopamidol (ISOVUE-370) 76 % injection 85 mL  IMG ONCE PRN      Last MAR action: Given - by Mc Pacheco on 10/22/21 at [facility]    10/21/21 2230 10/21/21 2157  vancomycin (VANCOCIN) 2,500 mg in dextrose 5 % 500 mL IVPB  ONCE     Question:  Antimicrobial Indications  Answer:  Pneumonia (HAP)    Last MAR action: New Bag - by Lianne Lane on 10/21/21 at [address] [facility], [address] Reins J    10/21/21 2200 10/21/21 2157  piperacillin-tazobactam (ZOSYN) 4,500 mg in dextrose 5 % 100 mL IVPB (mini-bag)  ONCE     Question:  Antimicrobial Indications  Answer:  Pneumonia (HAP)    Last MAR action: Stopped - by Lianne Lane on 10/21/21 at [address] [facility], [address] Reviewed   CBC WITH AUTO DIFFERENTIAL - Abnormal; Notable for the following components:       Result Value    RBC 3.70 (*)     Hemoglobin 9.2 (*)     Hematocrit 30.6 (*)     MCH 24.9 (*)     RDW 15.5 (*)     Seg Neutrophils 76 (*)     Lymphocytes 9 (*)     Immature Granulocytes 5 (*)     Absolute Lymph # 0.89 (*)     Absolute Immature Granulocyte 0.47 (*)     All other components within normal limits   COMPREHENSIVE METABOLIC PANEL - Abnormal; Notable for the following components:    Glucose 124 (*)     Potassium 3.1 (*)     Chloride 97 (*)     CO2 33 (*)     Alkaline Phosphatase 252 (*)     Albumin 3.1 (*)     Albumin/Globulin Ratio 0.7 (*)     All other components within normal limits   PROCALCITONIN - Abnormal; Notable for the following components:    Procalcitonin 2.33 (*)     All other components within normal limits   TROPONIN - Abnormal; Notable for the following components:    Troponin, High Sensitivity 54 (*)     All other components within normal limits   TROPONIN - Abnormal; Notable for the following components:    Troponin, High Sensitivity 59 (*)     All other components within normal limits   URINALYSIS WITH MICROSCOPIC - Abnormal; Notable for the following components:    Urine Hgb SMALL (*)     Leukocyte Esterase, Urine TRACE (*)     All other components within normal limits   BLOOD GAS, VENOUS - Abnormal; Notable for the following components:    pH, Andrae 7.422 (*)     pCO2, Andrae 58.3 (*)     pO2, Andrae 128.0 (*)     HCO3, Venous 37.3 (*)     Positive Base Excess, Andrae 11.5 (*)     O2 Sat, Andrae 98.8 (*)     All other components within normal limits   ELECTROLYTES PLUS - Abnormal; Notable for the following components:    POC Potassium 3.0 (*)     POC Chloride 93 (*)     POC TCO2 44 (*)     All other components within normal limits   HGB/HCT - Abnormal; Notable for the following components:    POC Hemoglobin 11.0 (*)     POC Hematocrit 32 (*)     All other components within normal limits   CALCIUM, IONIC (POC) - Abnormal; Notable for the following components:    POC Ionized Calcium 1.14 (*)     All other components within normal limits   TSH WITHOUT REFLEX - Abnormal; Notable for the following components:    TSH 16.83 (*)     All other components within normal limits   TROPONIN - Abnormal; Notable for the following components:    Troponin, High Sensitivity 57 (*)     All other components within normal limits   VENOUS BLOOD GAS, POINT OF CARE - Abnormal; Notable for the following components:    pH, Andrae 7.501 (*)     pCO2, Andrae 56.9 (*)     HCO3, Venous 44.5 (*)     Positive Base Excess, Andrae 19 (*)     O2 Sat, Andrae 87 (*)     All other components within normal limits   POCT GLUCOSE - Abnormal; Notable for the following components:    POC Glucose 134 (*)     All other components within normal limits   CULTURE, BLOOD 1   CULTURE, BLOOD 1   COVID-19, RAPID   CULTURE, URINE   GRAM STAIN   CULTURE, RESPIRATORY   LACTATE, SEPSIS   LACTATE, SEPSIS   AMMONIA   T4, FREE   CREATININE W/GFR POINT OF CARE   UREA N (POC)   LACTIC ACID,POINT OF CARE   POCT GLUCOSE   POCT GLUCOSE       RADIOLOGY  CT HEAD WO CONTRAST    Result Date: 10/22/2021  EXAMINATION: CT OF THE HEAD WITHOUT CONTRAST  10/22/2021 12:10 am TECHNIQUE: CT of the head was performed without the administration of intravenous contrast. Dose modulation, iterative reconstruction, and/or weight based adjustment of the mA/kV was utilized to reduce the radiation dose to as low as reasonably achievable. COMPARISON: 11/06/2020 HISTORY: ORDERING SYSTEM PROVIDED HISTORY: AMS TECHNOLOGIST PROVIDED HISTORY: AMS Decision Support Exception - unselect if not a suspected or confirmed emergency medical condition->Emergency Medical Condition (MA) Reason for Exam: AMS Acuity: Unknown Type of Exam: Unknown FINDINGS: BRAIN/VENTRICLES: There is no acute intracranial hemorrhage, mass effect or midline shift. No abnormal extra-axial fluid collection. The gray-white differentiation is maintained without evidence of an acute infarct. There is no evidence of hydrocephalus. ORBITS: The bilateral globes are intact. SINUSES: Mucoperiosteal thickening of the right sphenoid sinus is seen. The left sphenoid sinus is completely opacified. SOFT TISSUES/SKULL:  No acute abnormality of the visualized skull or soft tissues. No acute intracranial abnormality. MRI may be obtained if clinically indicated. CT ABDOMEN PELVIS W IV CONTRAST Additional Contrast? None    Result Date: 10/22/2021  EXAMINATION: CT OF THE ABDOMEN AND PELVIS WITH CONTRAST 10/21/2021 11:37 pm TECHNIQUE: CT of the abdomen and pelvis was performed with the administration of intravenous contrast. Multiplanar reformatted images are provided for review. Dose modulation, iterative reconstruction, and/or weight based adjustment of the mA/kV was utilized to reduce the radiation dose to as low as reasonably achievable.  COMPARISON: 12/18/2019 HISTORY: ORDERING SYSTEM PROVIDED HISTORY: AMS, lower abdominal pain TECHNOLOGIST PROVIDED HISTORY: Please go through testicles AMS, lower abdominal pain Reason for Exam: AMS, lower abdominal pain Acuity: Unknown Type of Exam: Unknown FINDINGS: Lower Chest: Please see separately dictated chest CT for findings the diaphragm. Small bilateral pleural effusions are again noted. A 2.8 cm left pectoral subcutaneous nodules again noted, increased in size compared to 2019 when it measured 2.2 cm. Organs: Cirrhotic configuration of the liver. The spleen is enlarged to 18 cm in craniocaudal dimension. The pancreas, kidneys and adrenal glands are without acute findings. The gallbladder is present without radiopaque cholelithiasis. GI/Bowel: No mechanical bowel obstruction. No evidence of appendicitis. Pelvis: Nonspecific presacral stranding. The prostate is diminutive. The urinary bladder contains a Clemons catheter and is nondistended. No pelvic adenopathy by size criteria. Small fat containing left inguinal hernia. Peritoneum/Retroperitoneum: Moderate atherosclerotic plaque. No ascites or pneumoperitoneum. Status post ventral hernia repair. Small fat containing left paramedian ventral hernia in left lower quadrant. Bones/Soft Tissues: No acute or aggressive osseous lesions. Small abdominal wall varices. Nonspecific presacral stranding. Otherwise no evidence of acute infectious or inflammatory process in the abdomen or pelvis. Cirrhosis with splenomegaly. XR CHEST PORTABLE    Result Date: 10/21/2021  EXAMINATION: ONE XRAY VIEW OF THE CHEST 10/21/2021 4:13 pm COMPARISON: October 15, 2021 HISTORY: ORDERING SYSTEM PROVIDED HISTORY: SOB TECHNOLOGIST PROVIDED HISTORY: SOB Reason for Exam: shortness of breath upright port FINDINGS: Marginal inspiration is present. Cardiomegaly is noted. Interstitial and alveolar opacities are present bilaterally, slightly worse compared to the prior study. Right pleural effusion is noted. Atelectatic changes present within the lung bases. Osseous structures are stable. No pneumothorax is noted. 1. Cardiomegaly 2.  Interval worsening of the interstitial alveolar opacities bilaterally, differential considerations include worsening edema versus infection 3. Small right pleural effusion     CT CHEST PULMONARY EMBOLISM W CONTRAST    Result Date: 10/22/2021  EXAMINATION: CTA OF THE CHEST 10/21/2021 11:37 pm TECHNIQUE: CTA of the chest was performed after the administration of intravenous contrast.  Multiplanar reformatted images are provided for review. MIP images are provided for review. Dose modulation, iterative reconstruction, and/or weight based adjustment of the mA/kV was utilized to reduce the radiation dose to as low as reasonably achievable. COMPARISON: May 4, 2016. HISTORY: ORDERING SYSTEM PROVIDED HISTORY: Dyspnea, low saturation TECHNOLOGIST PROVIDED HISTORY: Dyspnea, low saturation Decision Support Exception - unselect if not a suspected or confirmed emergency medical condition->Emergency Medical Condition (MA) Reason for Exam: Dyspnea, low saturation Acuity: Unknown Type of Exam: Unknown FINDINGS: Pulmonary Arteries: Suboptimal evaluation of the subsegmental and more peripheral pulmonary arteries due to motion artifact. Given this, no obvious acute pulmonary thromboembolic disease. Prominent main pulmonary artery measures 3.6 cm in diameter. Mediastinum: No evidence of mediastinal lymphadenopathy. The heart and pericardium demonstrate no acute abnormality. There is no acute abnormality of the thoracic aorta. Aortic valve leaflet calcifications. Lungs/pleura: Respiratory motion. Expiratory imaging. Multifocal bilateral heterogeneous opacities. Bilateral posterior lower lobe consolidations. Small bilateral pleural effusions. No pneumothorax. No endoluminal masslike lesion. Upper Abdomen: Limited images of the upper abdomen are unremarkable. Soft Tissues/Bones: Multilevel degenerative changes in the visualized spine. Chronic appearing mild multilevel vertebral body height loss in the thoracic spine. Diffusely heterogeneous marrow. Round 2.5 cm nodule in the subcutaneous soft tissues posterior to the left nipple demonstrates simple fluid attenuation and may represent a benign cyst.     1.  Suboptimal evaluation of the subsegmental and more peripheral pulmonary arteries due to motion artifact. Given this, no obvious acute pulmonary thromboembolic disease. Prominent main pulmonary artery suggests pulmonary hypertension. 2.  Multifocal bilateral heterogeneous pulmonary opacities. Findings may represent a combination of atelectasis and pulmonary edema. Multifocal pneumonia is an additional consideration. 3.  Small bilateral pleural effusions with associated lower lobe relaxation atelectasis. 4.  Round 2.5 cm nodule in the subcutaneous soft tissues posterior to the left nipple demonstrates simple fluid attenuation and may represent a benign cyst.  This could be further evaluated with focused ultrasound as warranted.        OUTSTANDING TASKS / ADDITIONAL COMMENTS   1. Bed placement    Cb Bah MD  Emergency Medicine Attending  [address] Simona Anderson MD  10/22/21 [id number]

## 2021-10-22 NOTE — ED PROVIDER NOTES
[facility] ED  eMERGENCY dEPARTMENT eNCOUnter   Attending Attestation     Pt Name: Angela Padilla  MRN: [id number]  [place] 1965  Date of evaluation: 10/22/21       Angela Padilla is a 61 y.o. male who presents with Shortness of Breath and Fever      History: Patient is a shortness of breath, fever, fatigue. Patient recently admitted for urinary tract infection. Patient is taking Cipro. Patient was at a care facility. Patient has pneumonia and altered mental status. Exam: Heart rate and rhythm are regular. Lungs are diminished. Abdomen is soft. Patient is awake, and alert however very somnolent. Plan for Vanco and Zosyn for pneumonia. Plan for admission. Will get labs, repeat x-ray, check for PE, get abdominal CT. I performed a history and physical examination of the patient and discussed management with the resident. I reviewed the residents note and agree with the documented findings and plan of care. Any areas of disagreement are noted on the chart. I was personally present for the key portions of any procedures. I have documented in the chart those procedures where I was not present during the key portions. I have personally reviewed all images and agree with the resident's interpretation. I have reviewed the emergency nurses triage note. I agree with the chief complaint, past medical history, past surgical history, allergies, medications, social and family history as documented unless otherwise noted below. Documentation of the HPI, Physical Exam and Medical Decision Making performed by medical students or scribes is based on my personal performance of the HPI, PE and MDM. For Phys Assistant/ Nurse Practitioner cases/documentation I have had a face to face evaluation of this patient and have completed at least one if not all key elements of the E/M (history, physical exam, and MDM). Additional findings are as noted.     For APC cases I have personally evaluated and examined the patient in conjunction with the APC and agree with the treatment plan and disposition of the patient as recorded by the APC.     Reji Espinoza MD  Attending Emergency  Physician       cSott Roman MD  10/22/21 1278

## 2021-10-22 NOTE — CARE COORDINATION
Case Management Initial Discharge Plan  Barbara Conteh,             Met with:patient to discuss discharge plans. Information verified: address, contacts, phone number, , insurance Yes  Insurance Provider: medicare, Sonia Burkes    Emergency Contact/Next of Carey Benitez name & number:  Debbie Rain (son) [phone]  Cindy Joseph (son) [phone]    Who are involved in patient's support system? sons    PCP: Aravind Deshpande MD  Date of last visit: unknown      Discharge Planning    Living Arrangements:    from [facility]      Patient able to perform ADL's:Dependent    Current Services (outpatient & in home) from SNF  DME equipment: na  DME provider: na    Is patient receiving oral anticoagulation therapy? No        Potential Assistance Needed:       Patient agreeable to home care: No  [place] of choice provided:  n/a    Prior SNF/Rehab Placement and Facility: [facility]  Agreeable to SNF/Rehab: Yes  [place] of choice provided: n/a     Evaluation: no    Expected Discharge date:       Patient expects to be discharged to: If home: is the family and/or caregiver wiling & able to provide support at home? yes  Who will be providing this support? son    Follow Up Appointment: Best Day/ Time:      Transportation provider: ambulance  Transportation arrangements needed for discharge: Yes    Readmission Risk              Risk of Unplanned Readmission:  24             Does patient have a readmission risk score greater than 14?: Yes  If yes, follow-up appointment must be made within 7 days of discharge. Goals of Care:       Educated son on transitional options, provided freedom of choice and are agreeable with plan      Discharge Plan: from [facility], called son Gretel Hall, he is agreeable to pt going back.  Spoke with admissions at [facility] pp, they can accept patient back          Electronically signed by Paco Harris RN on 10/22/21 at 12:23 PM EDT

## 2021-10-22 NOTE — H&P
[facility]  Office: [address], DO, Shara Bond, DO, Mary Kate Thomas, DO, Gladys Deborah Blood, DO, Jasmin Osorio MD, Clarita Perez MD, Mychal Medina MD, Claudean Millard, MD, Marivel Garner MD, Debbie Morgan MD, Natalie Baker MD, Lary Martino MD, Sky Zhang, DO, Sirisha Macdonald DO, Pb Walsh MD,  Suze Garcia DO, Tigre Salazar MD, Amy Barrera MD, Mariela Goodman MD, Luiz Caro MD, Rico Scott MD, Joy Garland MD, Debbie Hutson, [facility], [facility], CNP, Amanda Bright, CNP, Ayana Wylie, CNS, Roseline Pedraza, CNP, Valeriano Conway, CNP, Betzaida Dcikerson, CNP, Robin Mann, CNP, Ez Hartley, CNP, Karthikeyan Gomez, CNP, Elisa Crane PA-C, Donnie Phillips, [facility], Rosio Palma, CNP, Johann Barth, CNP, Lana Day, CNP, Shoshana Harris, CNP, Lamont Dixon, CNP, Vivi Canales, CNP, Tim Saint, [address]    HISTORY AND PHYSICAL EXAMINATION            Date:   10/22/2021  Patient name:  Mary Baer  Date of admission:  10/21/2021  9:44 PM  MRN:   [id number]  Account:  [de-identified]  YOB: 1965  PCP:    Clarence Justin MD  Room:   0294/1479-33  Code Status:    Full Code    Chief Complaint:     Chief Complaint   Patient presents with    Shortness of Breath    Fever       History Obtained From:     patient, electronic medical record    History of Present Illness:     Mary Baer is a 61 y.o. Non- / non  male who presents with Shortness of Breath and Fever   and is admitted to the hospital for the management of Pneumonia.   39-year-old male past medical history of urinary tract infection, type 2 diabetes, hyperlipidemia, prior alcohol abuse, depression, hypothyroidism, morbid obesity, bipolar disorder, anxiety, obstructive sleep apnea, venous insufficiency of bilateral lower extremities, history of alcoholic cirrhosis, history of portal hypertension, chronic dependence of supplemental oxygen, diastolic heart failure presents with shortness of breath with concern for pneumonia seen on chest x-ray at outlying facility. Patient was transported to [facility] for further care. Of note patient was recently discharged from another [facility] due to urinary tract infection and was discharged on cipro. Past Medical History:     Past Medical History:   Diagnosis Date    Anxiety and depression     Back pain, chronic     BPH (benign prostatic hyperplasia)     CHF (congestive heart failure) (HCC)     Cirrhosis (HCC)     Diabetes mellitus (HCC)     not checking bloodsugar at home    GERD (gastroesophageal reflux disease)     H/O cardiac catheterization not sure of date    no stents    Hepatitis     Pt does not know the type.      Hiatal hernia     History of alcohol abuse     Sober since 2013    Hyperlipidemia     not taking any medication    Hypertension     IBS (irritable bowel syndrome)     Morbid obesity ([address]     Neuropathy     feet,toes    On home oxygen therapy     prn    Pancreatitis     x 5 episodes    Primary osteoarthritis of right knee     Sleep apnea     No machine    Thyroid disease     Urinary bladder neurogenic dysfunction         Past Surgical History:     Past Surgical History:   Procedure Laterality Date    ABDOMEN SURGERY      hernia repair x4 (ventral)    COLONOSCOPY      2012    CYSTOSCOPY  01/24/2018    CYSTOSCOPY  02/20/2019    CYSTOSCOPY N/A 2/20/2019    CYSTOSCOPY DILATION performed by Amanda Wayne MD at [address] N/A 8/23/2019    CYSTOSCOPY/ DILATION NICOLE CATHETER EXCHANGE performed by Amanda Wayne MD at [address] [facility], [facility]     [address] [facility]  05/20/2018    repair and reduction of recurrent incarcerated incisional hernia with mesh    IN CYSTOURETHROSCOPY N/A 1/24/2018    CYSTOSCOPY Taran See performed by Amanda Wayne MD at [address] BURSA Bilateral 8/22/2017    FOOT DEBRIDEMENT INCISION AND DRAINAGE with bone bx performed by Rebel Loco DPM at [facility] EGD TRANSORAL BIOPSY SINGLE/MULTIPLE N/A 7/19/2017    EGD BIOPSY performed by Janie Peck MD at [address]  07/19/2017    polypectomy    UPPER GASTROINTESTINAL ENDOSCOPY N/A 3/14/2019    EGD BIOPSY performed by Bryan Collins MD at [facility] N/A 5/20/2018    REPAIR AND REDUCTION OF RECURRENT INCARCERATED INCISIONAL HERNIA WITH MESH performed by Rashi Delgadillo MD at [facility]        Medications Prior to Admission:     Prior to Admission medications    Medication Sig Start Date End Date Taking? Authorizing Provider   ciprofloxacin (CIPRO) 500 MG tablet Take 1 tablet by mouth every 12 hours for 10 days 10/18/21 10/28/21  William Samayoa DO   insulin glargine (BASAGLAR KWIKPEN) 100 UNIT/ML injection pen Inject 76 Units into the skin 2 times daily    Historical Provider, MD   pregabalin (LYRICA) 150 MG capsule Take 150 mg by mouth 2 times daily. Historical Provider, MD   ibuprofen (ADVIL;MOTRIN) 600 MG tablet Take 600 mg by mouth every 6 hours as needed for Pain    Historical Provider, MD   SITagliptin (JANUVIA) 100 MG tablet Take 100 mg by mouth daily    Historical Provider, MD   glipiZIDE (GLUCOTROL) 10 MG tablet Take 10 mg by mouth 2 times daily (before meals)    Historical Provider, MD   metFORMIN (GLUCOPHAGE) 500 MG tablet Take 500 mg by mouth 2 times daily (with meals)    Historical Provider, MD   nystatin (MYCOSTATIN) 190629 UNIT/GM cream Apply topically 2 times daily as needed (to skin folds)    Historical Provider, MD   clonazePAM (KLONOPIN) 1 MG tablet Take 1 tablet by mouth 2 times daily as needed for Anxiety for up to 3 days. 1/14/21 1/17/21  Lidia Dumont MD   oxyCODONE-acetaminophen (PERCOCET) 7.5-325 MG per tablet Take 1 tablet by mouth every 6 hours as needed for Pain.  ; Dispense 30 1/5/21 Historical Provider, MD   QUEtiapine (SEROQUEL XR) 50 MG extended release tablet Take 50 mg by mouth nightly    Historical Provider, MD   triamcinolone (KENALOG) 0.025 % cream Apply topically 2 times daily as needed    Historical Provider, MD   diphenoxylate-atropine (LOMOTIL) 2.5-0.025 MG per tablet Take 1 tablet by mouth 4 times daily as needed for Diarrhea. Historical Provider, MD   fluocinonide (LIDEX) 0.05 % external solution Apply topically as needed (scalp itching)    Historical Provider, MD   ketoconazole (NIZORAL) 2 % shampoo Apply topically Twice a Week    Historical Provider, MD   albuterol sulfate HFA (PROAIR HFA) 108 (90 Base) MCG/ACT inhaler Inhale 2 puffs into the lungs every 6 hours as needed for Wheezing    Historical Provider, MD   tamsulosin (FLOMAX) 0.4 MG capsule Take 1 capsule by mouth daily 1/24/18   Peter Pearson MD   oxybutynin (DITROPAN) 5 MG tablet Take 1 tablet by mouth 2 times daily 12/12/17   Sarita Samayoa DO   bumetanide (BUMEX) 2 MG tablet Take 2 mg by mouth 2 times daily    Historical Provider, MD   aspirin 81 MG EC tablet Take 1 tablet by mouth daily 7/26/17   Nabila Roach DO   nadolol (CORGARD) 40 MG tablet Take 40 mg by mouth daily    Historical Provider, MD   levothyroxine (SYNTHROID) 175 MCG tablet Take 175 mcg by mouth Daily     Historical Provider, MD   nystatin (MYCOSTATIN) 079586 UNIT/GM powder Apply topically 2 times daily as needed TO ABDOMINAL FOLDS, GROIN     Historical Provider, MD   esomeprazole (NEXIUM) 40 MG capsule Take 40 mg by mouth 2 times daily     Historical Provider, MD   DULoxetine (CYMBALTA) 60 MG capsule Take 60 mg by mouth every morning     Historical Provider, MD   venlafaxine (EFFEXOR-XR) 150 MG XR capsule Take 150 mg by mouth daily. Historical Provider, MD        Allergies:     No known allergies    Social History:     Tobacco:    reports that he has never smoked.  He has never used smokeless tobacco.  Alcohol:      reports no history of alcohol use. Drug Use:  reports no history of drug use. Family History:     Family History   Adopted: Yes       Review of Systems:     Positive and Negative as described in HPI. CONSTITUTIONAL:  negative for fevers, chills, sweats, fatigue, weight loss  HEENT:  negative for vision, hearing changes, runny nose, throat pain  RESPIRATORY:  negative for shortness of breath, cough, congestion, wheezing  CARDIOVASCULAR:  negative for chest pain, palpitations  GASTROINTESTINAL:  negative for nausea, vomiting, diarrhea, constipation, change in bowel habits, abdominal pain   GENITOURINARY:  negative for difficulty of urination, burning with urination, frequency   INTEGUMENT:  negative for rash, skin lesions, easy bruising   HEMATOLOGIC/LYMPHATIC:  negative for swelling/edema   ALLERGIC/IMMUNOLOGIC:  negative for urticaria , itching  ENDOCRINE:  negative increase in drinking, increase in urination, hot or cold intolerance  MUSCULOSKELETAL:  negative joint pains, muscle aches, swelling of joints  NEUROLOGICAL:  negative for headaches, dizziness, lightheadedness, numbness, pain, tingling extremities  BEHAVIOR/PSYCH:  negative for depression, anxiety    Physical Exam:   BP (!) 146/76   Pulse 58   Temp 97.6 °F (36.4 °C) (Oral)   Resp 19   Ht 5' 10\" (1.778 m)   Wt (!) 399 lb 14.6 oz (181.4 kg)   SpO2 95%   BMI 57.38 kg/m²   Temp (24hrs), Av.6 °F (37 °C), Min:97.4 °F (36.3 °C), Max:100.7 °F (38.2 °C)    Recent Labs     10/20/21  0644 10/20/21  1125 10/21/21  2204 10/22/21  0729   POCGLU 111* 163* 134* 112*       Intake/Output Summary (Last 24 hours) at 10/22/2021 3314  Last data filed at 10/22/2021 0602  Gross per 24 hour   Intake 100 ml   Output 300 ml   Net -200 ml       Physical Exam  Constitutional:       Appearance: He is morbidly obese. He is ill-appearing and toxic-appearing. He is not diaphoretic. HENT:      Head: Normocephalic and atraumatic.       Right Ear: External ear normal.      Left Ear: External ear normal.      Nose:      Comments: Nasal cannula 5L     Mouth/Throat:      Mouth: Mucous membranes are moist.   Eyes:      Extraocular Movements: Extraocular movements intact. Pupils: Pupils are equal, round, and reactive to light. Cardiovascular:      Rate and Rhythm: Normal rate and regular rhythm. Heart sounds: Murmur heard. Pulmonary:      Effort: Tachypnea and prolonged expiration present. Breath sounds: Examination of the right-upper field reveals decreased breath sounds. Examination of the left-upper field reveals decreased breath sounds. Examination of the right-middle field reveals decreased breath sounds. Examination of the right-lower field reveals decreased breath sounds and rales. Examination of the left-lower field reveals decreased breath sounds and rales. Decreased breath sounds and rales present. No wheezing or rhonchi. Chest:      Chest wall: No mass, tenderness or crepitus. Abdominal:      General: Abdomen is protuberant. Bowel sounds are normal.      Palpations: Abdomen is soft. There is no shifting dullness or fluid wave. Tenderness: There is no abdominal tenderness. Genitourinary:     Comments: Folley in place, clear/yellow urine  Musculoskeletal:      Cervical back: Normal range of motion. Right lower leg: Edema present. Left lower leg: Edema present. Comments: Venous staisis   Skin:     General: Skin is warm. Capillary Refill: Capillary refill takes less than 2 seconds. Findings: No rash. Neurological:      Mental Status: He is oriented to person, place, and time. Cranial Nerves: No cranial nerve deficit.    Psychiatric:         Mood and Affect: Mood normal.         Behavior: Behavior normal.           Investigations:      Laboratory Testing:  Recent Results (from the past 24 hour(s))   Venous Blood Gas, POC    Collection Time: 10/21/21 10:04 PM   Result Value Ref Range    pH, Andrae 7.501 (H) 7.320 - 7.430    pCO2, Andrae 56.9 (H) 41.0 - 51.0 mm Hg    pO2, Andrae 49.8 30 - 50 mm Hg    HCO3, Venous 44.5 (H) 22.0 - 29.0 mmol/L    Total CO2, Venous NOT REPORTED 23.0 - 30.0 mmol/L    Negative Base Excess, Andrae NOT REPORTED 0.0 - 2.0    Positive Base Excess, Andrae 19 (H) 0.0 - 3.0    O2 Sat, Andrae 87 (H) 60.0 - 85.0 %    O2 Device/Flow/% NOT REPORTED     Shay Test NOT REPORTED     Sample Site NOT REPORTED     Mode NOT REPORTED     FIO2 NOT REPORTED     Pt Temp NOT REPORTED     POC pH Temp NOT REPORTED     POC pCO2 Temp NOT REPORTED mm Hg    POC pO2 Temp NOT REPORTED mm Hg   ELECTROLYTES PLUS    Collection Time: 10/21/21 10:04 PM   Result Value Ref Range    POC Sodium 145 138 - 146 mmol/L    POC Potassium 3.0 (L) 3.5 - 4.5 mmol/L    POC Chloride 93 (L) 98 - 107 mmol/L    POC TCO2 44 (HH) 22 - 30 mmol/L    Anion Gap 9 7 - 16 mmol/L   Hemoglobin and hematocrit, blood    Collection Time: 10/21/21 10:04 PM   Result Value Ref Range    POC Hemoglobin 11.0 (L) 13.5 - 17.5 g/dL    POC Hematocrit 32 (L) 41 - 53 %   Creatinine W/GFR Point of Care    Collection Time: 10/21/21 10:04 PM   Result Value Ref Range    POC Creatinine 0.81 0.51 - 1.19 mg/dL    GFR Comment >60 >60 mL/min    GFR Non-African American >60 >60 mL/min    GFR Comment         CALCIUM, IONIC (POC)    Collection Time: 10/21/21 10:04 PM   Result Value Ref Range    POC Ionized Calcium 1.14 (L) 1.15 - 1.33 mmol/L   POCT urea (BUN)    Collection Time: 10/21/21 10:04 PM   Result Value Ref Range    POC BUN 15 8 - 26 mg/dL   Lactic Acid, POC    Collection Time: 10/21/21 10:04 PM   Result Value Ref Range    POC Lactic Acid 1.16 0.56 - 1.39 mmol/L   POCT Glucose    Collection Time: 10/21/21 10:04 PM   Result Value Ref Range    POC Glucose 134 (H) 74 - 100 mg/dL   CBC Auto Differential    Collection Time: 10/21/21 10:13 PM   Result Value Ref Range    WBC 10.1 3.5 - 11.3 k/uL    RBC 3.70 (L) 4.21 - 5.77 m/uL    Hemoglobin 9.2 (L) 13.0 - 17.0 g/dL    Hematocrit 30.6 (L) 40.7 - 50.3 %    MCV 82.7 82.6 - 102.9 fL    MCH 24.9 (L) 25.2 - 33.5 pg    MCHC 30.1 28.4 - 34.8 g/dL    RDW 15.5 (H) 11.8 - 14.4 %    Platelets 055 958 - 140 k/uL    MPV 10.4 8.1 - 13.5 fL    NRBC Automated 0.0 0.0 per 100 WBC    Differential Type NOT REPORTED     Seg Neutrophils 76 (H) 36 - 65 %    Lymphocytes 9 (L) 24 - 43 %    Monocytes 8 3 - 12 %    Eosinophils % 1 1 - 4 %    Basophils 1 0 - 2 %    Immature Granulocytes 5 (H) 0 %    Segs Absolute 7.77 1.50 - 8.10 k/uL    Absolute Lymph # 0.89 (L) 1.10 - 3.70 k/uL    Absolute Mono # 0.84 0.10 - 1.20 k/uL    Absolute Eos # 0.10 0.00 - 0.44 k/uL    Basophils Absolute 0.05 0.00 - 0.20 k/uL    Absolute Immature Granulocyte 0.47 (H) 0.00 - 0.30 k/uL    WBC Morphology NOT REPORTED     RBC Morphology ANISOCYTOSIS PRESENT     Platelet Estimate NOT REPORTED    Comprehensive Metabolic Panel    Collection Time: 10/21/21 10:13 PM   Result Value Ref Range    Glucose 124 (H) 70 - 99 mg/dL    BUN 13 6 - 20 mg/dL    CREATININE 1.08 0.70 - 1.20 mg/dL    Bun/Cre Ratio NOT REPORTED 9 - 20    Calcium 8.6 8.6 - 10.4 mg/dL    Sodium 142 135 - 144 mmol/L    Potassium 3.1 (L) 3.7 - 5.3 mmol/L    Chloride 97 (L) 98 - 107 mmol/L    CO2 33 (H) 20 - 31 mmol/L    Anion Gap 12 9 - 17 mmol/L    Alkaline Phosphatase 252 (H) 40 - 129 U/L    ALT 20 5 - 41 U/L    AST 25 <40 U/L    Total Bilirubin 0.63 0.3 - 1.2 mg/dL    Total Protein 7.4 6.4 - 8.3 g/dL    Albumin 3.1 (L) 3.5 - 5.2 g/dL    Albumin/Globulin Ratio 0.7 (L) 1.0 - 2.5    GFR Non-African American >60 >60 mL/min    GFR African American >60 >60 mL/min    GFR Comment          GFR Staging NOT REPORTED    Lactate, Sepsis    Collection Time: 10/21/21 10:13 PM   Result Value Ref Range    Lactic Acid, Sepsis NOT REPORTED 0.5 - 1.9 mmol/L    Lactic Acid, Sepsis, Whole Blood 1.6 0.5 - 1.9 mmol/L   Procalcitonin    Collection Time: 10/21/21 10:13 PM   Result Value Ref Range    Procalcitonin 2.33 (H) <0.09 ng/mL   Blood Gas, Venous    Collection Time: 10/21/21 10:13 PM   Result Value Ref Range    pH, Andrae 7.422 (H) 7.320 - 7.420    pCO2, Andrae 58.3 (H) 39 - 55    pO2, Andrae 128.0 (H) 30 - 50    HCO3, Venous 37.3 (H) 24 - 30 mmol/L    Positive Base Excess, Andrae 11.5 (H) 0.0 - 2.0 mmol/L    Negative Base Excess, Andrae NOT REPORTED 0.0 - 2.0 mmol/L    O2 Sat, Andrae 98.8 (H) 60.0 - 85.0 %    Total Hb NOT REPORTED 12.0 - 16.0 g/dl    Oxyhemoglobin NOT REPORTED 95.0 - 98.0 %    Carboxyhemoglobin 3.0 0 - 5 %    Methemoglobin NOT REPORTED 0.0 - 1.5 %    Pt Temp 37.0     pH, Andrae, Temp Adj NOT REPORTED 7.320 - 7.420    pCO2, Andrae, Temp Adj NOT REPORTED 39 - 55 mmHg    pO2, Andrae, Temp Adj NOT REPORTED 30 - 50 mmHg    O2 Device/Flow/% NOT REPORTED     Respiratory Rate NOT REPORTED     Shay Test NOT REPORTED     Sample Site NOT REPORTED     Pt. Position NOT REPORTED     Mode NOT REPORTED     Set Rate NOT REPORTED     Total Rate NOT REPORTED     VT NOT REPORTED     FIO2 INFORMATION NOT PROVIDED     Peep/Cpap NOT REPORTED     PSV NOT REPORTED     Text for Respiratory NOT REPORTED     NOTIFICATION NOT REPORTED     NOTIFICATION TIME NOT REPORTED    T4, Free    Collection Time: 10/21/21 10:13 PM   Result Value Ref Range    Thyroxine, Free 1.02 0.93 - 1.70 ng/dL   TSH without Reflex    Collection Time: 10/21/21 10:13 PM   Result Value Ref Range    TSH 16.83 (H) 0.30 - 5.00 mIU/L   Troponin    Collection Time: 10/21/21 10:13 PM   Result Value Ref Range    Troponin, High Sensitivity 57 (HH) 0 - 22 ng/L    Troponin T NOT REPORTED <0.03 ng/mL    Troponin Interp NOT REPORTED    Culture, Blood 1    Collection Time: 10/21/21 10:14 PM    Specimen: Blood   Result Value Ref Range    Specimen Description . BLOOD     Special Requests  L AC 20 ML     Culture NO GROWTH 2 HOURS    COVID-19, Rapid    Collection Time: 10/21/21 10:14 PM    Specimen: Nasopharyngeal Swab   Result Value Ref Range    Specimen Description . NASOPHARYNGEAL SWAB     SARS-CoV-2, Rapid Not Detected Not Detected   Culture, Blood 1    Collection Time: 10/21/21 10:15 PM    Specimen: Blood   Result Value Ref Range    Specimen Description . BLOOD     Special Requests  R HAND 10 ML     Culture NO GROWTH 2 HOURS    Urinalysis with Microscopic    Collection Time: 10/21/21 10:37 PM   Result Value Ref Range    Color, UA Yellow Yellow    Turbidity UA Clear Clear    Glucose, Ur NEGATIVE NEGATIVE    Bilirubin Urine NEGATIVE NEGATIVE    Ketones, Urine NEGATIVE NEGATIVE    Specific Gravity, UA 1.011 1.005 - 1.030    Urine Hgb SMALL (A) NEGATIVE    pH, UA 8.0 5.0 - 8.0    Protein, UA NEGATIVE NEGATIVE    Urobilinogen, Urine Normal Normal    Nitrite, Urine NEGATIVE NEGATIVE    Leukocyte Esterase, Urine TRACE (A) NEGATIVE    -          WBC, UA 2 TO 5 0 - 5 /HPF    RBC, UA 10 TO 20 0 - 4 /HPF    Casts UA NOT REPORTED 0 - 8 /LPF    Crystals, UA NOT REPORTED None /HPF    Epithelial Cells UA 0 TO 2 0 - 5 /HPF    Renal Epithelial, UA NOT REPORTED 0 /HPF    Bacteria, UA NOT REPORTED None    Mucus, UA NOT REPORTED None    Trichomonas, UA NOT REPORTED None    Amorphous, UA NOT REPORTED None    Other Observations UA NOT REPORTED NOT REQ.     Yeast, UA NOT REPORTED None   AMMONIA    Collection Time: 10/21/21 10:44 PM   Result Value Ref Range    Ammonia 34 16 - 60 umol/L   Troponin    Collection Time: 10/21/21 11:22 PM   Result Value Ref Range    Troponin, High Sensitivity 54 (HH) 0 - 22 ng/L    Troponin T NOT REPORTED <0.03 ng/mL    Troponin Interp NOT REPORTED    Lactate, Sepsis    Collection Time: 10/22/21 12:25 AM   Result Value Ref Range    Lactic Acid, Sepsis NOT REPORTED 0.5 - 1.9 mmol/L    Lactic Acid, Sepsis, Whole Blood 1.3 0.5 - 1.9 mmol/L   Troponin    Collection Time: 10/22/21 12:25 AM   Result Value Ref Range    Troponin, High Sensitivity 59 (HH) 0 - 22 ng/L    Troponin T NOT REPORTED <0.03 ng/mL    Troponin Interp NOT REPORTED    POC Glucose Fingerstick    Collection Time: 10/22/21  7:29 AM   Result Value Ref Range    POC Glucose 112 (H) 75 - 110 mg/dL       Imaging/Diagnostics:  CT HEAD WO Yes    Acquired hypothyroidism 10/22/2021 Yes    Urine retention 10/22/2021 Yes    Portal hypertension ([address] (Chronic) 10/22/2021 Yes    Venous stasis dermatitis of both lower extremities (Chronic) 10/22/2021 Yes    Chronic indwelling Clemons catheter (Chronic) 10/22/2021 Yes    Anxiety and depression 10/22/2021 Yes    BPH (benign prostatic hyperplasia) 10/22/2021 Yes    Morbid obesity ([address] 10/22/2021 Yes    On home oxygen therapy 10/22/2021 Yes    Overview Signed 11/4/2020  2:08 PM by Hailey Sawnn APRN - NP     prn               Plan:     Patient status inpatient in the Progressive Unit/Step down    Acute on chronic diastolic heart failure due to fluid overload. Lasix 40 mg, wean down oxygen as tolerated, follow up echo. Change nadolol to lopressor, takes bumex 1mg BID at nursing facility. Concern for Multifocal PNA with possible HAP. Vancomycin and zosym, Pulmonology consulted as well as infectious disaease  Hypothroidism resume synthroid 175 mcg  Flat troponin, no active chest pain. Follow up echo  Prior UTI was discharged on cipro and with chronic folley placement, appreciate ID recommendations, plan for outpatient urology follow up  Systolic murmur, follow up echo  Venous stasis dermatitis, ace wraps  Diabetes. Insulin slididng scale, hypoglycemia protocol, POCT glucose  Depression cymbalta, seroquel nightly  Peruipheral neuropathy lyrica  Chronic pain, percocet  PT/OT    Consultations:   IP CONSULT TO HOSPITALIST  IP CONSULT TO PHARMACY  IP CONSULT TO INFECTIOUS DISEASES  IP CONSULT TO PULMONOLOGY    Patient is admitted as inpatient status because of co-morbidities listed above, severity of signs and symptoms as outlined, requirement for current medical therapies and most importantly because of direct risk to patient if care not provided in a hospital setting. Expected length of stay > 48 hours.     Catina Bass MD  10/22/2021  8:14 AM    Copy sent to Dr. Lesli Hahn MD

## 2021-10-22 NOTE — CONSULTS
[facility] - Initial Consult Note  Today's Date and Time: 10/22/2021, 7:02 PM    Impression :   Chronic hypoxemic and hypercapnic respiratory failure,  DM2  Morbid Obesity with BMI 57  Essential HTN  Diastolic CHF with elevated ProBNP and Troponins  Multifocal changes and bilateral effusions by CT of chest. Most likely secondary to CHF rather than bacterial pneumonia    Recommendations:   Monitor off antibiotics   Diuresis  02 support    Medical Decision Making/Summary/Discussion:10/22/2021       Infection Control Recommendations   Vale Precautions      Antimicrobial Stewardship Recommendations     Discontinuation of therapy  Coordination of Outpatient Care:   Estimated Length of IV antimicrobials: 10-22-21  Patient will need Midline Catheter Insertion: no  Patient will need PICC line Insertion:no  Patient will need: Home IV , [place],  SNF,  LTAC: TBD  Patient will need outpatient wound care:    Chief complaint/reason for consultation:   Multifocal Pneumonia      History of Present Illness:   Marivel Conner is a 61y.o.-year-old  male who was initially admitted on 10/21/2021. Patient seen at the request of     INITIAL HISTORY:    Patient is known to our service. Previously treated by Dr Claude Byes during his admission to [facility] on 11/4/2020 because of toxic metabolic encephalopathy and UTI with E coli and Enterococus.      Patient has known medical history of morbid obesity, pancreatitis, urinary retention status post indwelling Clemons catheter placement, CHF, diabetes mellitus type 2, essential hypertension, morbid obesity with BMI of 57. Patient had a urine culture at [facility] in August 2019 that was positive for Enterococcus, E. coli, Pseudomonas. He had a urine culture at [facility] in January 2020 with E. coli and Enterococcus.      More recently he had E. coli and Enterococcus in urine on 11-6-20.      Then Klebsiella pneumoniae on 10-14-21 and History of alcohol abuse     Sober since 2013    Hyperlipidemia     not taking any medication    Hypertension     IBS (irritable bowel syndrome)     Morbid obesity ([address]     Neuropathy     feet,toes    On home oxygen therapy     prn    Pancreatitis     x 5 episodes    Primary osteoarthritis of right knee     Sleep apnea     No machine    Thyroid disease     Urinary bladder neurogenic dysfunction        Past Surgical  History:     Past Surgical History:   Procedure Laterality Date    ABDOMEN SURGERY      hernia repair x4 (ventral)    COLONOSCOPY      2012    CYSTOSCOPY  01/24/2018    CYSTOSCOPY  02/20/2019    CYSTOSCOPY N/A 2/20/2019    CYSTOSCOPY DILATION performed by Rashida Lerma MD at [address] [place] [address] 8/23/2019    CYSTOSCOPY/ DILATION NICOLE CATHETER EXCHANGE performed by Rashida Lerma MD at [address], COLON, DIAGNOSTIC     [address]  05/20/2018    repair and reduction of recurrent incarcerated incisional hernia with mesh    ME CYSTOURETHROSCOPY N/A 1/24/2018    CYSTOSCOPY Saralyn Merck performed by Rashida eLrma MD at [address] [facility] Bilateral 8/22/2017    FOOT [address] [facility] with bone bx performed by Shandra Watson DPM at [address] EGD TRANSORAL BIOPSY SINGLE/MULTIPLE N/A 7/19/2017    EGD BIOPSY performed by Dianna Haider MD at [address] [facility] [address] [facility] [address]  07/19/2017    polypectomy    UPPER GASTROINTESTINAL ENDOSCOPY N/A 3/14/2019    EGD BIOPSY performed by Maninder Daniels MD at [address] N/A 5/20/2018    REPAIR AND REDUCTION OF RECURRENT INCARCERATED INCISIONAL HERNIA WITH MESH performed by Natalia Powell MD at [address] [facility]       Medications:      sodium chloride flush  5-40 mL IntraVENous 2 times per day    enoxaparin  40 mg SubCUTAneous Daily    ipratropium-albuterol  1 ampule Inhalation Q4H WA    piperacillin-tazobactam 3,375 mg IntraVENous Q8H    insulin lispro  0-6 Units SubCUTAneous TID WC    insulin lispro  0-3 Units SubCUTAneous Nightly    vancomycin (VANCOCIN) intermittent dosing (placeholder)   Other RX Placeholder    vancomycin  1,000 mg IntraVENous Q12H    DULoxetine  60 mg Oral QAM    pregabalin  150 mg Oral BID    aspirin  81 mg Oral Daily    levothyroxine  175 mcg Oral Daily    QUEtiapine  50 mg Oral Nightly    tamsulosin  0.4 mg Oral Daily    metoprolol tartrate  25 mg Oral BID    pantoprazole  40 mg Oral QAM AC       Social History:     Social History     Socioeconomic History    Marital status:      Spouse name: Not on file    Number of children: Not on file    Years of education: Not on file    Highest education level: Not on file   Occupational History    Not on file   Tobacco Use    Smoking status: Never Smoker    Smokeless tobacco: Never Used   Vaping Use    Vaping Use: Never used   Substance and Sexual Activity    Alcohol use: No     Comment: quit drinking 2012    Drug use: No    Sexual activity: Not on file   Other Topics Concern    Not on file   Social History Narrative    Not on file     Social Determinants of Health     Financial Resource Strain:     Difficulty of Paying Living Expenses:    Food Insecurity:     Worried About Running Out of Food in the Last Year:     Ran Out of Food in the Last Year:    Transportation Needs:     Lack of Transportation (Medical):      Lack of Transportation (Non-Medical):    Physical Activity:     Days of Exercise per Week:     Minutes of Exercise per Session:    Stress:     Feeling of Stress :    Social Connections:     Frequency of Communication with Friends and Family:     Frequency of Social Gatherings with Friends and Family:     Attends Yazidism Services:     Active Member of Clubs or Organizations:     Attends Club or Organization Meetings:     Marital Status:    Intimate Partner Violence:     Fear of Current or Ex-Partner:     Emotionally Abused:     Physically Abused:     Sexually Abused:        Family History:     Family History   Adopted: Yes        Allergies:   No known allergies     Review of Systems:   Constitutional: No fevers or chills. SOB  Head: No headaches  Eyes: No double vision or blurry vision. No conjunctival inflammation. ENT: No sore throat or runny nose. . No hearing loss, tinnitus or vertigo. Cardiovascular: No chest pain or palpitations. Shortness of breath. GALLEGOS  Lung: Shortness of breath, cough. Minimal sputum production  Abdomen: No nausea, vomiting, diarrhea, or abdominal pain. Elizabeth Rast No cramps. Genitourinary: No increased urinary frequency, or dysuria. No hematuria. No suprapubic or CVA pain  Musculoskeletal: No muscle aches or pains. No joint effusions, swelling or deformities  Hematologic: No bleeding or bruising. Neurologic: No headache, weakness, numbness, or tingling. Integument: No rash, no ulcers. Psychiatric: No depression. Endocrine: No polyuria, no polydipsia, no polyphagia. Physical Examination :     Patient Vitals for the past 8 hrs:   BP Temp Temp src Pulse Resp SpO2   10/22/21 1612 120/86 98.6 °F (37 °C) Oral 62 15 95 %   10/22/21 1219     18 96 %   10/22/21 1140     20 95 %   10/22/21 1130 (!) 124/58 97.5 °F (36.4 °C) Oral 61 23 96 %     General Appearance: Somnolent, morbidly obese  Head:  Normocephalic, no trauma  Eyes: Pupils equal, round, reactive to light and accommodation; extraocular movements intact; sclera anicteric; conjunctivae pink. No embolic phenomena. ENT: Oropharynx clear, without erythema, exudate, or thrush. No tenderness of sinuses. Mouth/throat: mucosa pink and moist. No lesions. Dentition in good repair. Neck:Supple, without lymphadenopathy. Thyroid normal, No bruits. Pulmonary/Chest: Distant breath sounds, decreased sounds. Dullness at bases  Cardiovascular: Regular rate and rhythm without murmurs, rubs, or gallops. Abdomen: Soft, non tender. Bowel sounds normal. No organomegaly  All four Extremities: No cyanosis, clubbing, edema, or effusions. Neurologic: No gross sensory or motor deficits. Skin: Warm and dry with good turgor. Signs of peripheral arterial and venous insufficiency. Pitting and non pitting edema. Discoloration from venous stasis. Medical Decision Making -Laboratory:   I have independently reviewed/ordered the following labs:    CBC with Differential:   Recent Labs     10/20/21  0633 10/21/21  2213   WBC 9.1 10.1   HGB 9.1* 9.2*   HCT 31.6* 30.6*   * 173   LYMPHOPCT 7* 9*   MONOPCT 8* 8     BMP:   Recent Labs     10/21/21  2204 10/21/21  2213   NA  --  142   K  --  3.1*   CL  --  97*   CO2  --  33*   BUN  --  13   CREATININE 0.81 1.08     Hepatic Function Panel:   Recent Labs     10/21/21  2213   PROT 7.4   LABALBU 3.1*   BILITOT 0.63   ALKPHOS 252*   ALT 20   AST 25     No results for input(s): RPR in the last 72 hours. No results for input(s): HIV in the last 72 hours. No results for input(s): BC in the last 72 hours. Lab Results   Component Value Date    MUCUS NOT REPORTED 10/21/2021    RBC 3.70 10/21/2021    RBC 3.93 04/02/2012    TRICHOMONAS NOT REPORTED 10/21/2021    WBC 10.1 10/21/2021    YEAST NOT REPORTED 10/21/2021    TURBIDITY Clear 10/21/2021     Lab Results   Component Value Date    CREATININE 1.08 10/21/2021    CREATININE 0.81 10/21/2021    GLUCOSE 124 10/21/2021    GLUCOSE 102 08/30/2011       Medical Decision Making-Imaging:     EXAMINATION:   CTA OF THE CHEST 10/21/2021 11:37 pm       TECHNIQUE:   CTA of the chest was performed after the administration of intravenous   contrast.  Multiplanar reformatted images are provided for review.  MIP   images are provided for review.  Dose modulation, iterative reconstruction,   and/or weight based adjustment of the mA/kV was utilized to reduce the   radiation dose to as low as reasonably achievable.       COMPARISON:   May 4, 2016.       HISTORY:   [address] PROVIDED HISTORY: Dyspnea, low saturation   TECHNOLOGIST PROVIDED HISTORY:   Dyspnea, low saturation   Decision Support Exception - unselect if not a suspected or confirmed   emergency medical condition->Emergency Medical Condition (MA)   Reason for Exam: Dyspnea, low saturation   Acuity: Unknown   Type of Exam: Unknown       FINDINGS:   Pulmonary Arteries: Suboptimal evaluation of the subsegmental and more   peripheral pulmonary arteries due to motion artifact.  Given this, no obvious   acute pulmonary thromboembolic disease.  Prominent main pulmonary artery   measures 3.6 cm in diameter.       Mediastinum: No evidence of mediastinal lymphadenopathy.  The heart and   pericardium demonstrate no acute abnormality.  There is no acute abnormality   of the thoracic aorta.  Aortic valve leaflet calcifications.       Lungs/pleura: Respiratory motion.  Expiratory imaging.  Multifocal bilateral   heterogeneous opacities.  Bilateral posterior lower lobe consolidations. Small bilateral pleural effusions.  No pneumothorax.  No endoluminal masslike   lesion.       Upper Abdomen: Limited images of the upper abdomen are unremarkable.       Soft Tissues/Bones: Multilevel degenerative changes in the visualized spine.    Chronic appearing mild multilevel vertebral body height loss in the thoracic   spine.  Diffusely heterogeneous marrow.  Round 2.5 cm nodule in the   subcutaneous soft tissues posterior to the left nipple demonstrates simple   fluid attenuation and may represent a benign cyst.           Impression   1.  Suboptimal evaluation of the subsegmental and more peripheral pulmonary   arteries due to motion artifact.  Given this, no obvious acute pulmonary   thromboembolic disease.  Prominent main pulmonary artery suggests pulmonary   hypertension.       2.  Multifocal bilateral heterogeneous pulmonary opacities.  Findings may   represent a combination of atelectasis and pulmonary edema.  Multifocal   pneumonia is an additional consideration.       3.  Small bilateral pleural effusions with associated lower lobe relaxation   atelectasis.       4.  Round 2.5 cm nodule in the subcutaneous soft tissues posterior to the   left nipple demonstrates simple fluid attenuation and may represent a benign   cyst.  This could be further evaluated with focused ultrasound as warranted. Medical Decision Zmwzki-Pmxepqyo-Cuzsa:       Medical Decision Making-Other:     Note:  Labs, medications, radiologic studies were reviewed with personal review of films  Large amounts of data were reviewed  Discussed with nursing Staff, Discharge planner  Infection Control and Prevention measures reviewed  All prior entries were reviewed  Administer medications as ordered  Prognosis: [address]  Discharge planning reviewed  Follow up as outpatient. Thank you for allowing us to participate in the care of this patient. Please call with questions.     Raven Snyder MD  Pager: [phone] - Office: [phone]

## 2021-10-22 NOTE — ED TRIAGE NOTES
Was just released from [facility] yesterday from having a urinary tract infection from indwelling line, now here from [facility] with pneumonia and altered mental status, SOB and fever

## 2021-10-22 NOTE — ED NOTES
Clemons catheter exchanged. Urine output clear. Sample collected from sampling port and sent to lab.      Young Rachel RN  10/21/21 [id number]

## 2021-10-22 NOTE — ED NOTES
Dr Melvina Kruse and Dr Carlene Gaxiola at bedside. PT turned and backside checked for wounds. Minor skin breakdown on the jeniffer area, non-infectious looking. PT repositioned for comfort.       Patrick Alva RN  10/21/21 9300

## 2021-10-22 NOTE — CONSULTS
PULMONARY & CRITICAL CARE MEDICINE CONSULT NOTE     Patient:  Mindi Pham  MRN: [id number]  Admit date: 10/21/2021  Primary Care Physician: Irene Ellis MD  Consulting Physician: Ahsan Shields MD    HISTORY     CHIEF COMPLAINT/REASON FOR CONSULT: SOB    HISTORY OF PRESENT ILLNESS:  The patient is a 61 y.o. male care with complaint of shortness of breath and fever. Patient transferred from [facility] after treating with antibiotics for pneumonia. Poor historian, delayed responses. However alert oriented x3. Stated he has been sick for the past few days, shortness of breath, low-grade fevers. Denies any sick contacts. Lives with his son. Uses wheelchair for ambulation, able to take care of himself. Patient received [employer] code vaccine. Not received flu shot. Denies any smoking history. Never diagnosed with any COPD. Uses 34L oxygen at home. Not using any CPAP at home. Denies chest pain, urinary symptoms, nausea, vomiting, headache. Significant history of T2DM, hyperlipidemia, recent UTI, alcohol abuse, depression, hypothyroidism, morbid obesity, FABI, bilateral lower extremity venous insufficiency, alcohol cirrhosis, hypertension, diastolic heart failure on oxygen. CT PE negative for PE, however suboptimal evaluation. Showing pulmonary hypertension, multifocal opacities concerning for atelectasis/pneumonia. CT abdomen showing liver cirrhosis with splenomegaly. Having low-grade fevers 100 point 7F, HR 70s, /80 mmHg. On 45 L NC, saturating at 96%. WBC 10 K. Normocytic anemia. UA negative for UTI. Normal renal function. VBG showing 7.4 2/58/37  Troponin 5759. TSH 16, free thyroxine 1  Echo with severe LVH in 2014. CT PE concerning for pulmonary hypertension. Morbid obesity. History of MDRO.     PAST MEDICAL HISTORY:        Diagnosis Date    Anxiety and depression     Back pain, chronic     BPH (benign prostatic hyperplasia)     [facility]     Cirrhosis [facility]     Diabetes mellitus ([facility]     not checking bloodsugar at home    GERD (gastroesophageal reflux disease)     H/O cardiac catheterization not sure of date    no stents    Hepatitis     Pt does not know the type.      Hiatal hernia     History of alcohol abuse     Sober since 2013    Hyperlipidemia     not taking any medication    Hypertension     IBS (irritable bowel syndrome)     Morbid obesity ([facility]     Neuropathy     feet,toes    On home oxygen therapy     prn    Pancreatitis     x 5 episodes    Primary osteoarthritis of right knee     Sleep apnea     No machine    Thyroid disease     Urinary bladder neurogenic dysfunction      PAST SURGICAL HISTORY:        Procedure Laterality Date    ABDOMEN SURGERY      hernia repair x4 (ventral)    COLONOSCOPY      2012    CYSTOSCOPY  01/24/2018    CYSTOSCOPY  02/20/2019    CYSTOSCOPY N/A 2/20/2019    CYSTOSCOPY DILATION performed by Layla Prader, MD at [address] 8/23/2019    CYSTOSCOPY/ DILATION NICOLE CATHETER EXCHANGE performed by Layla Prader, MD at [facility], DIAGNOSTIC     [address]  05/20/2018    repair and reduction of recurrent incarcerated incisional hernia with mesh    IA CYSTOURETHROSCOPY N/A 1/24/2018    CYSTOSCOPY Heike Kristan performed by Layla Prader, MD at [address] Bilateral 8/22/2017    FOOT DEBRIDEMENT INCISION AND DRAINAGE with bone bx performed by Jose Alfredo Appiah DPM at [address] EGD TRANSORAL BIOPSY SINGLE/MULTIPLE N/A 7/19/2017    EGD BIOPSY performed by Leonid Odell MD at [address]  07/19/2017    polypectomy    UPPER GASTROINTESTINAL ENDOSCOPY N/A 3/14/2019    EGD BIOPSY performed by Elizabeth Kenney MD at [facility] N/A 5/20/2018    REPAIR AND REDUCTION OF RECURRENT INCARCERATED INCISIONAL HERNIA WITH MESH Andrew Zamudio MD   oxyCODONE-acetaminophen (PERCOCET) 7.5-325 MG per tablet Take 1 tablet by mouth every 6 hours as needed for Pain. ; Dispense 30 1/5/21   Historical Provider, MD   QUEtiapine (SEROQUEL XR) 50 MG extended release tablet Take 50 mg by mouth nightly    Historical Provider, MD   triamcinolone (KENALOG) 0.025 % cream Apply topically 2 times daily as needed    Historical Provider, MD   diphenoxylate-atropine (LOMOTIL) 2.5-0.025 MG per tablet Take 1 tablet by mouth 4 times daily as needed for Diarrhea. Historical Provider, MD   fluocinonide (LIDEX) 0.05 % external solution Apply topically as needed (scalp itching)    Historical Provider, MD   ketoconazole (NIZORAL) 2 % shampoo Apply topically Twice a Week    Historical Provider, MD   albuterol sulfate HFA (PROAIR HFA) 108 (90 Base) MCG/ACT inhaler Inhale 2 puffs into the lungs every 6 hours as needed for Wheezing    Historical Provider, MD   tamsulosin (FLOMAX) 0.4 MG capsule Take 1 capsule by mouth daily 1/24/18   Lakeisha Duckworth MD   oxybutynin (DITROPAN) 5 MG tablet Take 1 tablet by mouth 2 times daily 12/12/17   Dulce Samayoa DO   bumetanide (BUMEX) 2 MG tablet Take 2 mg by mouth 2 times daily    Historical Provider, MD   aspirin 81 MG EC tablet Take 1 tablet by mouth daily 7/26/17   Kolby Rodriguez DO   nadolol (CORGARD) 40 MG tablet Take 40 mg by mouth daily    Historical Provider, MD   levothyroxine (SYNTHROID) 175 MCG tablet Take 175 mcg by mouth Daily     Historical Provider, MD   nystatin (MYCOSTATIN) 825220 UNIT/GM powder Apply topically 2 times daily as needed TO ABDOMINAL FOLDS, GROIN     Historical Provider, MD   esomeprazole (NEXIUM) 40 MG capsule Take 40 mg by mouth 2 times daily     Historical Provider, MD   DULoxetine (CYMBALTA) 60 MG capsule Take 60 mg by mouth every morning     Historical Provider, MD   venlafaxine (EFFEXOR-XR) 150 MG XR capsule Take 150 mg by mouth daily.     Historical Provider, MD IMMUNIZATIONS:    There is no immunization history on file for this patient.     REVIEW OF SYSTEMS:  General: negative for chills, fatigue or fever  ENT: negative for headaches, nasal congestion, sore throat or visual changes  Allergy and Immunology: negative for postnasal drip or seasonal allergies  Hematological and Lymphatic: negative for bleeding problems, swollen lymph nodes  Respiratory: positive for cough, orthopnea, shortness of breath and sputum changes negative for hemoptysis, tachypnea or wheezing  Cardiovascular: negative for edema or palpitations  Gastrointestinal: negative for abdominal pain, change in bowel habits or nausea/vomiting  Genito-Urinary: negative for dysuria or urinary frequency/urgency  Musculoskeletal: negative for joint pain or joint swelling  Neurological: negative for numbness/tingling, seizures or weakness  Dermatological: negative for pruritus or rash    PHYSICAL EXAMINATION     VITAL SIGNS:   LAST-  BP (!) 146/76   Pulse 58   Temp 97.6 °F (36.4 °C) (Oral)   Resp 19   Ht 5' 10\" (1.778 m)   Wt (!) 399 lb 14.6 oz (181.4 kg)   SpO2 95%   BMI 57.38 kg/m²   8-24 HR RANGE-  TEMP Temp  Av.6 °F (37 °C)  Min: 97.4 °F (36.3 °C)  Max: 100.7 °F (58.9 °C)   BP Systolic (41TPB), NUH:076 , Min:126 , KZR:663      Diastolic (93CUF), IWW:03, Min:55, Max:76     PULSE Pulse  Av  Min: 58  Max: 76   RR Resp  Av.7  Min: 14  Max: 20   O2 SAT SpO2  Av.5 %  Min: 92 %  Max: 95 %   OXYGEN DELIVERY O2 Flow Rate (L/min)  Av.7 L/min  Min: 4 L/min  Max: 5 L/min     SYSTEMIC EXAMINATION:   General appearance -appears drowsy, morbidly obese  Mental status - alert, oriented to person, place, and time  Eyes - pupils equal and reactive, extraocular eye movements intact, sclera anicteric  Ears - not examined  Nose - normal and patent, no erythema, discharge  Mouth - mucous membranes moist, pharynx normal without lesions  Neck - supple, no significant adenopathy not cooperative with JVP checking. Chest -limited exam due to body habitus. Decreased breath sounds throughout. No wheezing, no rales. Heart - normal rate, regular rhythm, normal S1, S2, no murmurs, rubs, clicks or gallops  Abdomen - soft, nontender, nondistended, no masses or organomegaly  Neurological - motor and sensory grossly normal bilaterally  Musculoskeletal - no joint tenderness, deformity or swelling  Extremities - peripheral pulses normal, pitting and nonpitting edema, dermatitis changes on lower extremity. DATA REVIEW     Medications: Current Inpatient  Scheduled Meds:   sodium chloride flush  5-40 mL IntraVENous 2 times per day    enoxaparin  40 mg SubCUTAneous Daily    ipratropium-albuterol  1 ampule Inhalation Q4H WA    piperacillin-tazobactam  3,375 mg IntraVENous Q8H    insulin lispro  0-6 Units SubCUTAneous TID WC    insulin lispro  0-3 Units SubCUTAneous Nightly    vancomycin (VANCOCIN) intermittent dosing (placeholder)   Other RX Placeholder    vancomycin  1,000 mg IntraVENous Q12H    DULoxetine  60 mg Oral QAM    pregabalin  150 mg Oral BID    potassium chloride  40 mEq Oral Once    furosemide  40 mg IntraVENous Once    aspirin  81 mg Oral Daily    levothyroxine  175 mcg Oral Daily    QUEtiapine  50 mg Oral Nightly    tamsulosin  0.4 mg Oral Daily    metoprolol tartrate  25 mg Oral BID    pantoprazole  40 mg Oral QAM AC     Continuous Infusions:   sodium chloride      dextrose       INPUT/OUTPUT:  In: 100   Out: 300 [Urine:300]    LABS:-  ABGs:   No results found for: PH, PCO2, PO2, HCO3, O2SAT  No results for input(s): PHART, PO2ART, KQI1EYZ, AHO3YUC, BEART, G6KSXRSE in the last 72 hours.   Lab Results   Component Value Date    POCPH 7.484 (H) 10/15/2021    POCPCO2 41.0 10/15/2021    POCPO2 128.4 (H) 10/15/2021    POCHCO3 30.9 (H) 10/15/2021    BCGS9SDI 99 (H) 10/15/2021     CBC:   Recent Labs     10/20/21  0633 10/21/21  2213   WBC 9.1 10.1   HGB 9.1* 9.2*   HCT 31.6* 30.6*   MCV 85.2 82.7 * 173   LYMPHOPCT 7* 9*   RBC 3.71* 3.70*   MCH 24.5* 24.9*   MCHC 28.8 30.1   RDW 15.0* 15.5*     BMP:   Recent Labs     10/21/21  2204 10/21/21  2213   NA  --  142   K  --  3.1*   CL  --  97*   CO2  --  33*   BUN  --  13   CREATININE 0.81 1.08   GLUCOSE  --  124*     Liver Function Test:   Recent Labs     10/21/21  2213   PROT 7.4   LABALBU 3.1*   ALT 20   AST 25   ALKPHOS 252*   BILITOT 0.63     Amylase/Lipase:  No results for input(s): AMYLASE, LIPASE in the last 72 hours. Coagulation Profile:   No results for input(s): INR, PROTIME, APTT in the last 72 hours. Cardiac Enzymes:  No results for input(s): CKTOTAL, CKMB, CKMBINDEX, TROPONINI in the last 72 hours. Lactic Acid:  Lab Results   Component Value Date    LACTA 2.0 11/06/2020    LACTA 1.9 11/04/2020    LACTA 0.8 12/07/2017     BNP:   Lab Results   Component Value Date    BNP NOT REPORTED 06/18/2014    BNP NOT REPORTED 06/17/2014    BNP 8 07/07/2012     D-Dimer:  Lab Results   Component Value Date    DDIMER 2.49 05/04/2016     Others:   Lab Results   Component Value Date    TSH 16.83 (H) 10/21/2021    G4YYWLQ 5.7 06/22/2014     Lab Results   Component Value Date    MARISA NEGATIVE 06/19/2014    SEDRATE 77 (H) 11/06/2020    CRP 42.3 (H) 11/06/2020     No results found for: Shoshana Johnson  Lab Results   Component Value Date    IRON 29 (L) 05/07/2016    TIBC 291 05/07/2016    FERRITIN 109 05/07/2016     No results found for: SPEP, UPEP  No results found for: PSA, CEA, , XP2096,   Microbiology:    Pathology:    Radiology Reports:  CT HEAD WO CONTRAST   Final Result   No acute intracranial abnormality. MRI may be obtained if clinically   indicated. CT CHEST PULMONARY EMBOLISM W CONTRAST   Final Result   1. Suboptimal evaluation of the subsegmental and more peripheral pulmonary   arteries due to motion artifact. Given this, no obvious acute pulmonary   thromboembolic disease.   Prominent main pulmonary artery suggests pulmonary hypertension. 2.  Multifocal bilateral heterogeneous pulmonary opacities. Findings may   represent a combination of atelectasis and pulmonary edema. Multifocal   pneumonia is an additional consideration. 3.  Small bilateral pleural effusions with associated lower lobe relaxation   atelectasis. 4.  Round 2.5 cm nodule in the subcutaneous soft tissues posterior to the   left nipple demonstrates simple fluid attenuation and may represent a benign   cyst.  This could be further evaluated with focused ultrasound as warranted. CT ABDOMEN PELVIS W IV CONTRAST Additional Contrast? None   Final Result   Nonspecific presacral stranding. Otherwise no evidence of acute infectious   or inflammatory process in the abdomen or pelvis. Cirrhosis with splenomegaly. XR CHEST PORTABLE   Final Result   1. Cardiomegaly   2. Interval worsening of the interstitial alveolar opacities bilaterally,   differential considerations include worsening edema versus infection   3. Small right pleural effusion             Pulmonary Function test:    Polysomnogram:    Echocardiogram:   No results found for this or any previous visit. Cardiac Catheterization:   No results found for this or any previous visit. ASSESSMENT AND PLAN     Assessment:  Acute hypoxic respiratory failure  Suspected CO2 narcosis  Obesity hypoventilation on 45 LNC at home. Obstructive sleep apnea on CPAP at home  Never smoked, no history of COPD/asthma or any lung disease. morbid obesity  Pulmonary HTN  Multiple comorbidities: T2DM, hyperlipidemia, recent UTI, alcohol abuse, depression, hypothyroidism, bilateral lower extremity venous insufficiency, Venous stasis dermatitis, alcohol cirrhosis, hypertension, diastolic heart failure with severe LVH. Plan:    I personally interviewed/examined the patient; reviewed interval history, interpreted all available radiographic and laboratory data at the time of service.     Obtain

## 2021-10-22 NOTE — PROGRESS NOTES
Physical Therapy        Physical Therapy Cancel Note      DATE: 10/22/2021    NAME: Shree Brandt  MRN: [id number]   : 1965      Patient not seen this date for Physical Therapy due to:     Other: OT evaluating pt when PT checked around 1530; check 10/23      Electronically signed by Jeana Aguilar PT on 10/22/2021 at 4:39 PM

## 2021-10-22 NOTE — PROGRESS NOTES
[facility]ound Ostomy Continence Nurse  Consult Note       NAME:  Day Nevarez  MEDICAL RECORD NUMBER:  [id number]  AGE: 61 y.o. GENDER: male  : 1965  TODAY'S DATE:  10/22/2021    Subjective:     Reason for WOCN Evaluation and Assessment: \"sacral wound, venous stasis\"      Day Nevarez is a 61 y.o. male referred by:   [] Physician  [] Nursing  [] Other:     Wound Identification:  Superficial, scattered erosions rt posterior thigh due to moisture and friction. Pannus and groin fold intertrigo  Venous stasis hyperpigmentation of the BLE. No open wounds. Objective:      BP (!) 124/58   Pulse 61   Temp 97.5 °F (36.4 °C) (Oral)   Resp 18   Ht 5' 10\" (1.778 m)   Wt (!) 399 lb 14.6 oz (181.4 kg)   SpO2 96%   BMI 57.38 kg/m²   Fantasma Risk Score:      LABS    CBC:   Lab Results   Component Value Date    WBC 10.1 10/21/2021    RBC 3.70 10/21/2021    RBC 3.93 2012    HGB 9.2 10/21/2021     CMP:  Albumin:    Lab Results   Component Value Date    LABALBU 3.1 10/21/2021    LABALBU 4.0 2012     PT/INR:    Lab Results   Component Value Date    PROTIME 14.3 10/15/2021    PROTIME 11.6 2012    INR 1.1 10/15/2021     HgBA1c:    Lab Results   Component Value Date    LABA1C 6.3 10/14/2021     PTT: No components found for: LABPTT      Assessment:       Superficial, scattered erosions rt posterior thigh due to moisture and friction. Pannus and groin fold intertrigo  Venous stasis hyperpigmentation of the BLE. No open wounds. Response to treatment:  Well tolerated by patient. Plan:     Plan of Care: Turn every 2 hours  Float heels off of bed with pillows under calves    Use lift sling to reposition patient to minimize potential for shear injury. Routine incontinence care with incontinence barrier cloths and zinc oxide cream. Apply zinc oxide cream BID and prn incontinence.    Moisture wicking under pads   Aleah HP moisture wicking cloths to the pannus and groin folds      Specialty Bed Required : Yes   [x] Low Air Loss   [x] Pressure Redistribution  [] Fluid Immersion  [x] Bariatric  [] Total Pressure Relief  [] Other:     Discharge Plan:  TBD    Patient/Caregiver Teaching:  Requires gentle but firm encouragement to allow hygiene and repositioning.   [] Indicates understanding       [x] Needs reinforcement  [] Unsuccessful      [] Verbal Understanding  [] Demonstrated understanding       [] No evidence of learning  [] Refused teaching         [] N/A    Patient requires assistance with basic hygiene. No open wounds found. Minor skin irritation of the folds and rt posterior thigh. Wound Ostomy Continence nurse (ET nurse) will no longer follow pt.    Call prn if concerns related to skin, wound, or ostomy care       Electronically signed by Azael uMrdock RN, [facility] on 10/22/2021 at 3:15 PM

## 2021-10-22 NOTE — PROGRESS NOTES
Occupational Therapy   Occupational Therapy Initial Assessment  Date: 10/22/2021   Patient Name: Mindi Pham  MRN: [id number]     : 1965    Date of Service: 10/22/2021   Obtained from medical chart:   \" Mindi Pham is a 61 y.o. Non- / non  male who presents with Shortness of Breath and Fever   and is admitted to the hospital for the management of Pneumonia. 67-year-old male past medical history of urinary tract infection, type 2 diabetes, hyperlipidemia, prior alcohol abuse, depression, hypothyroidism, morbid obesity, bipolar disorder, anxiety, obstructive sleep apnea, venous insufficiency of bilateral lower extremities, history of alcoholic cirrhosis, history of portal hypertension, chronic dependence of supplemental oxygen, diastolic heart failure presents with shortness of breath with concern for pneumonia seen on chest x-ray at outlying facility. \"    Discharge Recommendations: Patient is currently unsafe to return to prior living environment at this time d/t functional deficits impacting patient's performance and safety with ADLs and functional tasks. Patient would benefit from continued therapy after discharge     Assessment   Performance deficits / Impairments: Decreased functional mobility ; Decreased endurance;Decreased coordination;Decreased ADL status; Decreased ROM; Decreased balance;Decreased strength;Decreased safe awareness;Decreased cognition;Decreased posture  Assessment: Pt supine in bed upon arrival. OT facilitated bed mobility to sit EOB at Mod X2. Pt demonstrated decreased O2 levels, OT educated and demonstrated pursed lip breathing throughout to maintain O2 levels, questionable waveform throughout. Increased from 3 L to 3.5 L throughout functional tasks. Pt verbalized increased feelings of fearfulness and being overwhelmed throughout, requiring Max VC for participation and emotional support w/ increased time to assist in coping.  OT facilitated therapuetic presence throughout to ensure pt safety w/ good return. ADL engagement limited this date d/t decreased sitting tolerance and decreased cognition. Required significant time to complete functional tasks d/t pt requiring education and support for initiation. Pt may benefit from acute OT services to address deficits in endurance, balance, coordination, cognition, and safety awareness impacting performance  in ADLs/functional tasks. Pt would be unsafe to return to prior living arrangement w/out 24/7 assist to safely engage in ADLs/IADLs/functional mobility tasks. Prognosis: Good  Decision Making: High Complexity  Patient Education: OT Role, OT POC, Purpose of Eval, Transfer Training, Safety awareness, pursed lip breathing - fair/good return  Barriers to Learning: Cognition  REQUIRES OT FOLLOW UP: Yes  Activity Tolerance  Activity Tolerance: Patient limited by fatigue (Pt limited by fearfulness and easily overwhelmed throughout session)  Safety Devices  Safety Devices in place: Yes  Type of devices: Bed alarm in place;Call light within reach; Patient at risk for falls; Left in bed;Nurse notified  Restraints  Initially in place: Yes  Restraints: 4X bed rails         Patient Diagnosis(es): The primary encounter diagnosis was Pneumonia of both lungs due to infectious organism, unspecified part of lung. A diagnosis of Hypokalemia was also pertinent to this visit. has a past medical history of Anxiety and depression, Back pain, chronic, BPH (benign prostatic hyperplasia), CHF (congestive heart failure) ([address] Cirrhosis ([address] Diabetes mellitus ([address] GERD (gastroesophageal reflux disease), H/O cardiac catheterization, Hepatitis, Hiatal hernia, History of alcohol abuse, Hyperlipidemia, Hypertension, IBS (irritable bowel syndrome), Morbid obesity ([address] Neuropathy, On home oxygen therapy, Pancreatitis, Primary osteoarthritis of right knee, Sleep apnea, Thyroid disease, and Urinary bladder neurogenic dysfunction.    has a past surgical history that includes Colonoscopy; Abdomen surgery; hernia repair; Endoscopy, colon, diagnostic; Upper gastrointestinal endoscopy (07/19/2017); pr egd transoral biopsy single/multiple (N/A, 7/19/2017); pr deep dissec foot infec,1 bursa (Bilateral, 8/22/2017); Cystoscopy (01/24/2018); pr cystourethroscopy (N/A, 1/24/2018); Incisional hernia repair (05/20/2018); ventral hernia repair (N/A, 5/20/2018); Cystocopy (02/20/2019); Cystoscopy (N/A, 2/20/2019); Upper gastrointestinal endoscopy (N/A, 3/14/2019); and Cystoscopy (N/A, 8/23/2019). Restrictions  Restrictions/Precautions  Restrictions/Precautions: Up as Tolerated  Required Braces or Orthoses?: No    Subjective   General  Patient assessed for rehabilitation services?: Yes  Family / Caregiver Present: No  General Comment  Comments: RN mick'barbi OT evaluation. Pt agreeable w/ evaluation. Patient Currently in Pain: Yes (Pt reports pain in B LE and groin; pt unable to give formal number d/t feeling overwhelmed w/ question)  Pain Assessment  Pain Assessment: Faces  Doll-Baker Pain Rating: Hurts a little bit  Non-Pharmaceutical Pain Intervention(s): Distraction;Repositioned; Therapeutic presence  Response to Pain Intervention: Patient Satisfied    Social/Functional History  Social/Functional History  Lives With: Son  Type of Home: House  Home Layout: One level  Home Access: Ramped entrance  Bathroom Shower/Tub: Walk-in shower, Shower chair with back  Bathroom Toilet: Standard  Bathroom Equipment: Grab bars in shower, Grab bars around toilet  Home Equipment: Rolling walker, Wheelchair-manual, Oxygen, Lift chair  Receives Help From: Home health  ADL Assistance: Needs assistance (Per patient chart, receives help w/ bathing and dressing from aide)  Homemaking Assistance: Needs assistance (Per patients chart, pt's son and aide complete)  Homemaking Responsibilities: No  Ambulation Assistance: Needs assistance (Per patient's chart, uses RW at baseline)  Transfer Assistance: Needs assistance (Per patient's chart, uses lift chair)  Active : No  Patient's  Info: Per patient's chart, uses transportation services  Occupation: On disability  Type of occupation:   Leisure & Hobbies: Watching baseball  Additional Comments: Social functioning information obtained from patient's chart from JAQUELIN chaudhry on 10/17     Objective   Vision: Impaired  Vision Exceptions: Wears glasses at all times  Hearing: Within functional limits    Orientation  Overall Orientation Status: Impaired  Orientation Level: Disoriented to situation;Oriented to place;Oriented to person;Disoriented to time (Pt reported 2022)     Balance  Sitting Balance: Contact guard assistance (~15 min EOB, static; w/ unilateral support on bedrail throughout. Pt limited in sitting balance d/t increased feeling of fearfulness. Pt verbalized increased feelings of \"anxiousness\" throughout.)  ADL  Feeding: Setup; Increased time to complete;Modified independent   Grooming: Minimal assistance; Increased time to complete;Verbal cueing;Setup  UE Bathing: Moderate assistance;Verbal cueing; Increased time to complete;Setup  LE Bathing: Maximum assistance;Setup;Verbal cueing; Increased time to complete  UE Dressing: Moderate assistance;Setup;Verbal cueing; Increased time to complete  LE Dressing: Maximum assistance;Setup;Verbal cueing; Increased time to complete  Toileting: Maximum assistance;Setup; Increased time to complete; Other (Comment) (Verbal Cueing)  Additional Comments: Pt attempted to facilitate grooming task sitting EOB, however pt began to feel exteremly overwhelmed and required emotional support and assistance back to bed to ensure pt safety. Pt limited in ADL engagement this date d/t increased feelings of fearfulness, decreased sitting tolerance, and decreased endurance.   Tone RUE  RUE Tone: Normotonic  Tone LUE  LUE Tone: Normotonic  Coordination  Movements Are Fluid And Coordinated: Yes     Bed mobility  Rolling to Right: General: 4/5  RUE Strength  R Hand General: 4/5  RUE Strength Comment: Limited assessment;  Appeared WFL during functional task    Plan   Plan  Times per week: 3-4X/Wk  Current Treatment Recommendations: Balance Training, Functional Mobility Training, Endurance Training, Strengthening, ROM, Safety Education & Training, Self-Care / ADL, Patient/Caregiver Education & Training    AM-PAC Score     AM-PAC Inpatient Daily Activity Raw Score: 15 (10/22/21 1806)  AM-PAC Inpatient ADL T-Scale Score : 34.69 (10/22/21 1806)  ADL Inpatient CMS 0-100% Score: 56.46 (10/22/21 1806)  ADL Inpatient CMS G-Code Modifier : CK (10/22/21 1806)    Goals  Short term goals  Time Frame for Short term goals: Pt will, by d/c  Short term goal 1: Demo UB ADLs at [facility] w/ set up and > 2 VC for initiation/attention  Short term goal 2: Demo LB ADLs/toileting at [facility] w/ AE PRN and > 3 VC for initiation/attention  Short term goal 3: Demo bed mobility at Mod X1 to engage in ADLs and > 2 VC for initiation/attention  Short term goal 4: Demo 10+ min of dynamic sitting balance at SBA w/ bilateral hand release to engage in ADLs  Short term goal 5: Demo implemention of EC/WS tech during ADLs w/ >2 VC to promote engagement in ADLs  Short term goal 6: Complete 3-step functional task w/ 0 VC for initiation/attention to promote increased engagement in ADLs  Short term goal 7: Notfiy OTR to update POC as patient progresses     Therapy Time   Individual Concurrent Group Co-treatment   Time In 1513         Time Out 1601         Minutes 48         Timed Code Treatment Minutes: [address], S/OT

## 2021-10-22 NOTE — ED NOTES
Bed: 12  Expected date:   Expected time:   Means of arrival:   Comments:  [facility]  10/21/21 8621

## 2021-10-23 LAB
ANION GAP SERPL CALCULATED.3IONS-SCNC: 12 MMOL/L (ref 9–17)
BNP INTERPRETATION: ABNORMAL
BUN BLDV-MCNC: 12 MG/DL (ref 6–20)
BUN/CREAT BLD: ABNORMAL (ref 9–20)
CALCIUM SERPL-MCNC: 8.4 MG/DL (ref 8.6–10.4)
CHLORIDE BLD-SCNC: 96 MMOL/L (ref 98–107)
CO2: 36 MMOL/L (ref 20–31)
CREAT SERPL-MCNC: 0.98 MG/DL (ref 0.7–1.2)
GFR AFRICAN AMERICAN: >60 ML/MIN
GFR NON-AFRICAN AMERICAN: >60 ML/MIN
GFR SERPL CREATININE-BSD FRML MDRD: ABNORMAL ML/MIN/{1.73_M2}
GFR SERPL CREATININE-BSD FRML MDRD: ABNORMAL ML/MIN/{1.73_M2}
GLUCOSE BLD-MCNC: 111 MG/DL (ref 75–110)
GLUCOSE BLD-MCNC: 111 MG/DL (ref 75–110)
GLUCOSE BLD-MCNC: 118 MG/DL (ref 75–110)
GLUCOSE BLD-MCNC: 119 MG/DL (ref 70–99)
GLUCOSE BLD-MCNC: 119 MG/DL (ref 75–110)
HCT VFR BLD CALC: 33.4 % (ref 40.7–50.3)
HEMOGLOBIN: 9.5 G/DL (ref 13–17)
MAGNESIUM: 2 MG/DL (ref 1.6–2.6)
MCH RBC QN AUTO: 25.5 PG (ref 25.2–33.5)
MCHC RBC AUTO-ENTMCNC: 28.4 G/DL (ref 28.4–34.8)
MCV RBC AUTO: 89.8 FL (ref 82.6–102.9)
NRBC AUTOMATED: 0 PER 100 WBC
PDW BLD-RTO: 15.2 % (ref 11.8–14.4)
PLATELET # BLD: 144 K/UL (ref 138–453)
PMV BLD AUTO: 10.4 FL (ref 8.1–13.5)
POTASSIUM SERPL-SCNC: 3.3 MMOL/L (ref 3.7–5.3)
PRO-BNP: 957 PG/ML
RBC # BLD: 3.72 M/UL (ref 4.21–5.77)
SODIUM BLD-SCNC: 144 MMOL/L (ref 135–144)
VANCOMYCIN RANDOM DATE LAST DOSE: NORMAL
VANCOMYCIN RANDOM DOSE AMOUNT: NORMAL
VANCOMYCIN RANDOM TIME LAST DOSE: NORMAL
VANCOMYCIN RANDOM: 20.9 UG/ML
WBC # BLD: 8.8 K/UL (ref 3.5–11.3)

## 2021-10-23 PROCEDURE — 6360000002 HC RX W HCPCS: Performed by: NURSE PRACTITIONER

## 2021-10-23 PROCEDURE — 80202 ASSAY OF VANCOMYCIN: CPT

## 2021-10-23 PROCEDURE — 80048 BASIC METABOLIC PNL TOTAL CA: CPT

## 2021-10-23 PROCEDURE — 99231 SBSQ HOSP IP/OBS SF/LOW 25: CPT | Performed by: INTERNAL MEDICINE

## 2021-10-23 PROCEDURE — 6370000000 HC RX 637 (ALT 250 FOR IP): Performed by: NURSE PRACTITIONER

## 2021-10-23 PROCEDURE — 82947 ASSAY GLUCOSE BLOOD QUANT: CPT

## 2021-10-23 PROCEDURE — 2060000000 HC ICU INTERMEDIATE R&B

## 2021-10-23 PROCEDURE — 99232 SBSQ HOSP IP/OBS MODERATE 35: CPT | Performed by: INTERNAL MEDICINE

## 2021-10-23 PROCEDURE — 6360000002 HC RX W HCPCS: Performed by: STUDENT IN AN ORGANIZED HEALTH CARE EDUCATION/TRAINING PROGRAM

## 2021-10-23 PROCEDURE — 94761 N-INVAS EAR/PLS OXIMETRY MLT: CPT

## 2021-10-23 PROCEDURE — 94640 AIRWAY INHALATION TREATMENT: CPT

## 2021-10-23 PROCEDURE — 83880 ASSAY OF NATRIURETIC PEPTIDE: CPT

## 2021-10-23 PROCEDURE — 6360000002 HC RX W HCPCS: Performed by: INTERNAL MEDICINE

## 2021-10-23 PROCEDURE — 2580000003 HC RX 258: Performed by: NURSE PRACTITIONER

## 2021-10-23 PROCEDURE — 85027 COMPLETE CBC AUTOMATED: CPT

## 2021-10-23 PROCEDURE — 6370000000 HC RX 637 (ALT 250 FOR IP): Performed by: INTERNAL MEDICINE

## 2021-10-23 PROCEDURE — 36415 COLL VENOUS BLD VENIPUNCTURE: CPT

## 2021-10-23 PROCEDURE — 83735 ASSAY OF MAGNESIUM: CPT

## 2021-10-23 PROCEDURE — 2700000000 HC OXYGEN THERAPY PER DAY

## 2021-10-23 PROCEDURE — 6370000000 HC RX 637 (ALT 250 FOR IP): Performed by: STUDENT IN AN ORGANIZED HEALTH CARE EDUCATION/TRAINING PROGRAM

## 2021-10-23 RX ORDER — QUETIAPINE FUMARATE 25 MG/1
25 TABLET, FILM COATED ORAL NIGHTLY
Status: DISCONTINUED | OUTPATIENT
Start: 2021-10-23 | End: 2021-10-26

## 2021-10-23 RX ORDER — CLONAZEPAM 1 MG/1
1 TABLET ORAL 2 TIMES DAILY
Status: DISCONTINUED | OUTPATIENT
Start: 2021-10-23 | End: 2021-10-24

## 2021-10-23 RX ORDER — CLONAZEPAM 1 MG/1
1 TABLET ORAL 2 TIMES DAILY
Status: DISCONTINUED | OUTPATIENT
Start: 2021-10-23 | End: 2021-10-23

## 2021-10-23 RX ORDER — FUROSEMIDE 10 MG/ML
40 INJECTION INTRAMUSCULAR; INTRAVENOUS DAILY
Status: DISCONTINUED | OUTPATIENT
Start: 2021-10-23 | End: 2021-10-24

## 2021-10-23 RX ORDER — CLONAZEPAM 0.25 MG/1
1 TABLET, ORALLY DISINTEGRATING ORAL ONCE
Status: COMPLETED | OUTPATIENT
Start: 2021-10-23 | End: 2021-10-23

## 2021-10-23 RX ORDER — POTASSIUM BICARBONATE 25 MEQ/1
50 TABLET, EFFERVESCENT ORAL DAILY
Status: DISCONTINUED | OUTPATIENT
Start: 2021-10-23 | End: 2021-10-24

## 2021-10-23 RX ADMIN — PIPERACILLIN AND TAZOBACTAM 3375 MG: 3; .375 INJECTION, POWDER, FOR SOLUTION INTRAVENOUS at 04:22

## 2021-10-23 RX ADMIN — FUROSEMIDE 40 MG: 10 INJECTION, SOLUTION INTRAMUSCULAR; INTRAVENOUS at 13:55

## 2021-10-23 RX ADMIN — PREGABALIN 150 MG: 75 CAPSULE ORAL at 20:50

## 2021-10-23 RX ADMIN — ENOXAPARIN SODIUM 40 MG: 40 INJECTION SUBCUTANEOUS at 20:51

## 2021-10-23 RX ADMIN — IPRATROPIUM BROMIDE AND ALBUTEROL SULFATE 1 AMPULE: .5; 2.5 SOLUTION RESPIRATORY (INHALATION) at 15:38

## 2021-10-23 RX ADMIN — CLONAZEPAM 1 MG: 0.25 TABLET, ORALLY DISINTEGRATING ORAL at 12:26

## 2021-10-23 RX ADMIN — QUETIAPINE FUMARATE 25 MG: 25 TABLET ORAL at 20:50

## 2021-10-23 RX ADMIN — IPRATROPIUM BROMIDE AND ALBUTEROL SULFATE 1 AMPULE: .5; 2.5 SOLUTION RESPIRATORY (INHALATION) at 12:27

## 2021-10-23 RX ADMIN — CLONAZEPAM 1 MG: 1 TABLET ORAL at 18:55

## 2021-10-23 RX ADMIN — PIPERACILLIN AND TAZOBACTAM 3375 MG: 3; .375 INJECTION, POWDER, FOR SOLUTION INTRAVENOUS at 13:32

## 2021-10-23 RX ADMIN — IPRATROPIUM BROMIDE AND ALBUTEROL SULFATE 1 AMPULE: .5; 2.5 SOLUTION RESPIRATORY (INHALATION) at 21:21

## 2021-10-23 RX ADMIN — METOPROLOL TARTRATE 25 MG: 25 TABLET ORAL at 20:50

## 2021-10-23 RX ADMIN — ENOXAPARIN SODIUM 40 MG: 40 INJECTION SUBCUTANEOUS at 10:17

## 2021-10-23 RX ADMIN — VANCOMYCIN HYDROCHLORIDE 1000 MG: 1 INJECTION, SOLUTION INTRAVENOUS at 11:50

## 2021-10-23 ASSESSMENT — PAIN SCALES - GENERAL
PAINLEVEL_OUTOF10: 0

## 2021-10-23 ASSESSMENT — PAIN SCALES - WONG BAKER
WONGBAKER_NUMERICALRESPONSE: 0
WONGBAKER_NUMERICALRESPONSE: 0

## 2021-10-23 NOTE — PROGRESS NOTES
Pharmacy Vancomycin Consult     Vancomycin Day: 3  Current Dosinmg IV Q12H  Current indication: Pneumonia    Temp max:  afebrile    Recent Labs     10/21/21  2213 10/23/21  0704   BUN 13 12   CREATININE 1.08 0.98   WBC 10.1 8.8       Intake/Output Summary (Last 24 hours) at 10/23/2021 0959  Last data filed at 10/23/2021 0400  Gross per 24 hour   Intake    Output 4050 ml   Net -4050 ml     Culture Date      Source                       Results  10.                   COVID Swab             Not detected  10.21                   Urine                          No growth  10.21                   Blood x2                    Pending    Ht Readings from Last 1 Encounters:   10/22/21 5' 10\" (1.778 m)        Wt Readings from Last 1 Encounters:   10/23/21 (!) 399 lb 14.6 oz (181.4 kg)       Body mass index is 57.38 kg/m². Estimated Creatinine Clearance: 137 mL/min (based on SCr of 0.98 mg/dL). Random: 20.9 mcg/mL    Assessment/Plan:  Random level (drawn ~7.5 hrs post dose) this morning within appropriate range. Will continue current therapy and follow closely.     Sisi Pretty, PharmD BCPS BCCCP  10/23/2021 9:59 AM

## 2021-10-23 NOTE — PROGRESS NOTES
Physician Progress Note      PATIENT:               Ping England  CSN #:                  [id number]  :                       1965  ADMIT DATE:       10/21/2021 9:44 PM  DISCH DATE:  Shay Germain  PROVIDER #:        Marga Watson MCCORMACK          QUERY TEXT:    Pt admitted with Acute CHF and concern for pneumonia . Pt noted to have   altered mental status with somnolence on arrival to the ED per medical record   Alert to person and place but unable to state date of birth and disoriented to   situation and time. . If possible, please document in the progress notes and   discharge summary if you are evaluating and / or treating any of the   following: The medical record reflects the following:  Risk Factors: CHF, pneumonia, pulm HTN, Acute respiratory failure , morbid   obesity , alcohol abuse  Clinical Indicators: Pulm consult documents acute hypoxic respiratory failure   with suspected C02 narcosis, oxygen saturations range form 86-99 on 4-5 liters    altered mental status with somnolence on arrival to the ED per medical   record Alert to person and place but unable to state date of birth and   disoriented to situation and time  Treatment: monitoring, oxygen, IV antibiotics, ID consult, pulmonary consult. thank you Call if questions [facility]                                                                                                                                                                                                                                                                                                                                 [phone]  Options provided:  -- Anoxic encephalopathy  -- Metabolic encephalopathy  -- Delirium due to, Please specify cause.   -- Delirium  -- Other - I will add my own diagnosis  -- Disagree - Not applicable / Not valid  -- Disagree - Clinically unable to determine / Unknown  -- Refer to Clinical Documentation Reviewer    PROVIDER RESPONSE TEXT:    This patient has metabolic encephalopathy. Query created by:  Gen Cleaning on 10/23/2021 9:52 AM      Electronically signed by:  [facility] 10/23/2021 11:16 AM

## 2021-10-23 NOTE — PLAN OF CARE
Problem: RESPIRATORY  Intervention: Respiratory assessment  Note: BRONCHOSPASM/BRONCHOCONSTRICTION     [x]         IMPROVE AERATION/BREATH SOUNDS  [x]   ADMINISTER BRONCHODILATOR THERAPY AS APPROPRIATE  [x]   ASSESS BREATH SOUNDS  []   IMPLEMENT AEROSOL/MDI PROTOCOL  [x]   PATIENT EDUCATION AS NEEDED      PROVIDE ADEQUATE OXYGENATION WITH ACCEPTABLE SP02/ABG'S    [x]  IDENTIFY APPROPRIATE OXYGEN THERAPY  [x]   MONITOR SP02/ABG'S AS NEEDED   [x]   PATIENT EDUCATION AS NEEDED

## 2021-10-23 NOTE — PROGRESS NOTES
[facility]  Office: [address], DO, Jeovanny Nationy, DO, Abad Base, DO, Bradford Huntley Blood, DO, Cinda Morelos MD, Raúl Lane MD, Marcus Fajardo MD, Aydee Pardo MD, Nydia Ambrose MD, Merrick Shaw MD, Mahesh Briseno MD, Libia Wright MD, Cali Perez, DO, Niraj Menon, DO, Teresa Melendez MD,  Enzo Sheikh DO, Rosette Harvey MD, Elizabeth Abel MD, Dana Pickard MD, Nabila Clarke MD, Paul Rice MD, Mickey Pfeiffer MD, Kena Sterling, [facility], [facility] [place], [facility], Avery Steve, CNP, Joshua Seaman, [facility], Johny Bahena, CNP, Gunnar Clark, Lina Anthony, CNP, Baron Salazar, CNP, Adrian Medina, [facility], Gabe Saenz, CNP, Dia Casey PA-C, Denver Parks, [facility], Rosalino Wong, CNP, Oliver West, CNP, Giulia Serna, CNP, Darryl Plunkett, CNP, Mickey Dixon, CNP, Clair Alejandra, CNP, James Morton, [address] [place] [address]    Progress Note    10/23/2021    3:41 PM    Name:   Otilia Bennett  MRN:     [id number]     Acct:      [de-identified]   Room:   [address] [place] [address] Day:  1  Admit Date:  10/21/2021  9:44 PM    PCP:   Jennifer Mendiola MD  Code Status:  Full Code    Subjective:     C/C:   Chief Complaint   Patient presents with    Shortness of Breath    Fever     Interval History Status: improved. Patient seen and examined, no issues overnight, difficult to maintain attention this morning, later that afternoon patient was complaining of spiders in his room and hallucinations. Brief History:     From H&P  Otilia Bennett is a 61 y.o. Non- / non  male who presents with Shortness of Breath and Fever   and is admitted to the hospital for the management of Pneumonia.   50-year-old male past medical history of urinary tract infection, type 2 diabetes, hyperlipidemia, prior alcohol abuse, depression, hypothyroidism, morbid obesity, bipolar disorder, anxiety, obstructive sleep apnea, venous insufficiency of bilateral lower extremities, history of alcoholic cirrhosis, history of portal hypertension, chronic dependence of supplemental oxygen, diastolic heart failure presents with shortness of breath with concern for pneumonia seen on chest x-ray at outlying facility. Patient was transported to [facility] for further care. Of note patient was recently discharged from another [facility] due to urinary tract infection and was discharged on cipro. Review of Systems:     Constitutional:  negative for chills, fevers, sweats  Respiratory:  negative for cough, dyspnea on exertion, shortness of breath, wheezing  Cardiovascular:  negative for chest pain, chest pressure/discomfort, lower extremity edema, palpitations  Gastrointestinal:  negative for abdominal pain, constipation, diarrhea, nausea, vomiting  Neurological:  negative for dizziness, headache    Medications: Allergies:     Allergies   Allergen Reactions    No Known Allergies        Current Meds:   Scheduled Meds:    potassium bicarbonate  50 mEq Oral Daily    QUEtiapine  25 mg Oral Nightly    furosemide  40 mg IntraVENous Daily    sodium chloride flush  5-40 mL IntraVENous 2 times per day    enoxaparin  40 mg SubCUTAneous Daily    ipratropium-albuterol  1 ampule Inhalation Q4H WA    insulin lispro  0-6 Units SubCUTAneous TID WC    insulin lispro  0-3 Units SubCUTAneous Nightly    DULoxetine  60 mg Oral QAM    pregabalin  150 mg Oral BID    aspirin  81 mg Oral Daily    levothyroxine  175 mcg Oral Daily    tamsulosin  0.4 mg Oral Daily    metoprolol tartrate  25 mg Oral BID    pantoprazole  40 mg Oral QAM AC     Continuous Infusions:    sodium chloride      dextrose       PRN Meds: sodium chloride flush, sodium chloride, ondansetron **OR** ondansetron, magnesium hydroxide, acetaminophen **OR** acetaminophen, albuterol, glucose, dextrose, glucagon (rDNA), dextrose, albuterol sulfate HFA    Data:     Past Medical History:   has a past medical history of Anxiety and depression, Back pain, chronic, BPH (benign prostatic hyperplasia), CHF (congestive heart failure) ([facility] Cirrhosis ([facility] Diabetes [facility] GERD (gastroesophageal reflux disease), H/O cardiac catheterization, Hepatitis, Hiatal hernia, History of alcohol abuse, Hyperlipidemia, Hypertension, IBS (irritable bowel syndrome), Morbid obesity ([facility] Neuropathy, On home oxygen therapy, Pancreatitis, Primary osteoarthritis of right knee, Sleep apnea, Thyroid disease, and Urinary bladder neurogenic dysfunction. Social History:   reports that he has never smoked. He has never used smokeless tobacco. He reports that he does not drink alcohol and does not use drugs. Family History:   Family History   Adopted: Yes       Vitals:  BP (!) 165/66   Pulse 61   Temp 98.6 °F (37 °C) (Axillary)   Resp 18   Ht 5' 10\" (1.778 m)   Wt (!) 399 lb 14.6 oz (181.4 kg)   SpO2 100%   BMI 57.38 kg/m²   Temp (24hrs), Av.3 °F (36.8 °C), Min:97 °F (36.1 °C), Max:98.9 °F (37.2 °C)    Recent Labs     10/22/21  1608 10/22/21  2022 10/23/21  0837 10/23/21  1226   POCGLU 129* 99 119* 111*       I/O (24Hr):     Intake/Output Summary (Last 24 hours) at 10/23/2021 1541  Last data filed at 10/23/2021 1214  Gross per 24 hour   Intake 801 ml   Output 3050 ml   Net -2249 ml       Labs:  Hematology:  Recent Labs     10/21/21  2213 10/23/21  0704   WBC 10.1 8.8   RBC 3.70* 3.72*   HGB 9.2* 9.5*   HCT 30.6* 33.4*   MCV 82.7 89.8   MCH 24.9* 25.5   MCHC 30.1 28.4   RDW 15.5* 15.2*    144   MPV 10.4 10.4     Chemistry:  Recent Labs     10/21/21  2204 10/21/21  2213 10/21/21  2322 10/22/21  0025 10/23/21  0704   NA  --  142  --   --  144   K  --  3.1*  --   --  3.3*   CL  --  97*  --   --  96*   CO2  --  33*  --   --  36*   GLUCOSE  --  124*  --   --  119*   BUN  --  13  --   --  12   CREATININE 0.81 1.08  --   --  0.98   MG  --   --   --   --  2.0   ANIONGAP  --  12  --   --  12   LABGLOM >60 >60  --   --  >60 GFRAA  --  >60  --   --  >60   CALCIUM  --  8.6  --   --  8.4*   PROBNP  --   --   --   --  957*   TROPHS  --  57* 54* 59*  --      Recent Labs     10/21/21  2204 10/21/21  2213 10/21/21  2244 10/22/21  0729 10/22/21  1129 10/22/21  1608 10/22/21  2022 10/23/21  0837 10/23/21  1226   PROT  --  7.4  --   --   --   --   --   --   --    LABALBU  --  3.1*  --   --   --   --   --   --   --    TSH  --  16.83*  --   --   --   --   --   --   --    AST  --  25  --   --   --   --   --   --   --    ALT  --  20  --   --   --   --   --   --   --    ALKPHOS  --  252*  --   --   --   --   --   --   --    BILITOT  --  0.63  --   --   --   --   --   --   --    AMMONIA  --   --  34  --   --   --   --   --   --    POCGLU   < >  --   --  112* 115* 129* 99 119* 111*    < > = values in this interval not displayed. ABG:  Lab Results   Component Value Date    POCPH 7.398 10/22/2021    PHART 7.330 06/18/2014    POCPCO2 66.6 10/22/2021    HQN2OGM 68.0 06/18/2014    POCPO2 57.8 10/22/2021    PO2ART 84.0 06/18/2014    POCHCO3 41.1 10/22/2021    KQI2DUN 34.9 06/18/2014    NBEA NOT REPORTED 10/22/2021    PBEA 14 10/22/2021    TDZ6LUG NOT REPORTED 10/22/2021    LESL4WUZ 88 10/22/2021    L5AHAQJT 96.5 06/18/2014    FIO2 NOT REPORTED 10/22/2021     Lab Results   Component Value Date/Time    SPECIAL NOT REPORTED 10/21/2021 10:37 PM     Lab Results   Component Value Date/Time    CULTURE NO GROWTH 10/21/2021 10:37 PM       Radiology:  CT HEAD WO CONTRAST    Result Date: 10/22/2021  No acute intracranial abnormality. MRI may be obtained if clinically indicated. CT ABDOMEN PELVIS W IV CONTRAST Additional Contrast? None    Result Date: 10/22/2021  Nonspecific presacral stranding. Otherwise no evidence of acute infectious or inflammatory process in the abdomen or pelvis. Cirrhosis with splenomegaly. XR CHEST PORTABLE    Result Date: 10/21/2021  1. Cardiomegaly 2.  Interval worsening of the interstitial alveolar opacities bilaterally, differential considerations include worsening edema versus infection 3. Small right pleural effusion     CT CHEST PULMONARY EMBOLISM W CONTRAST    Result Date: 10/22/2021  1. Suboptimal evaluation of the subsegmental and more peripheral pulmonary arteries due to motion artifact. Given this, no obvious acute pulmonary thromboembolic disease. Prominent main pulmonary artery suggests pulmonary hypertension. 2.  Multifocal bilateral heterogeneous pulmonary opacities. Findings may represent a combination of atelectasis and pulmonary edema. Multifocal pneumonia is an additional consideration. 3.  Small bilateral pleural effusions with associated lower lobe relaxation atelectasis. 4.  Round 2.5 cm nodule in the subcutaneous soft tissues posterior to the left nipple demonstrates simple fluid attenuation and may represent a benign cyst.  This could be further evaluated with focused ultrasound as warranted.        Physical Examination:        General appearance:  alert, cooperative and no distress  Mental Status:  oriented to person, place and time and normal affect  Lungs:  clear to auscultation bilaterally, normal effort  Heart:  regular rate and rhythm, no murmur  Abdomen:  soft, nontender, nondistended, normal bowel sounds, no masses, hepatomegaly, splenomegaly  Extremities:  no edema, redness, tenderness in the calves  Skin:  no gross lesions, rashes, induration    Assessment:        Hospital Problems         Last Modified POA    * (Principal) Pneumonia 26/28/0654 Yes    Alcoholic cirrhosis of liver ([address] sober since 2013 10/22/2021 Yes    Bipolar disorder ([address] 10/22/2021 Yes    Type 2 diabetes mellitus with diabetic neuropathy, with long-term current use of insulin ([address] 10/22/2021 Yes    FABI (obstructive sleep apnea) 10/22/2021 Yes    Primary osteoarthritis of right knee (Chronic) 10/22/2021 Yes    Type 2 diabetes mellitus with diabetic neuropathy ([address] (Chronic) 10/22/2021 Yes    Acquired hypothyroidism 10/22/2021 Yes    Urine retention 10/22/2021 Yes    Portal hypertension ([address] (Chronic) 10/22/2021 Yes    Venous stasis dermatitis of both lower extremities (Chronic) 10/22/2021 Yes    Chronic indwelling Clemons catheter (Chronic) 10/22/2021 Yes    Anxiety and depression 10/22/2021 Yes    BPH (benign prostatic hyperplasia) 10/22/2021 Yes    Morbid obesity ([address] 10/22/2021 Yes    On home oxygen therapy 10/22/2021 Yes    Overview Signed 11/4/2020  2:08 PM by SUNSHINE Campbell - GILBERT     prn               Plan:        Acute respiratory failure with hypoxia -likely pulmonary edema, Lasix 40 mg given yesterday with 4 L diuresis, continue 40 mg daily IV. Multifocal pneumoniapatient is on vancomycin and Zosyn. Check MRSA nares, pulmonary consulted as well as infectious disease  Hallucinationspossibly withdrawal from benzodiazepines, give 1 dose of Klonopin, cautious benzodiazepine as patient is also a chronic CO2 retainer.   Hypothyroidismcontinue Synthroid  Urinary retention f/u outpatient  Type 2 diabetescontinue sliding scale and POC glucose  Super morbid obesity  FABI  Bipolar disorder    Sis Carlisle DO  10/23/2021  3:41 PM

## 2021-10-24 ENCOUNTER — APPOINTMENT (OUTPATIENT)
Dept: GENERAL RADIOLOGY | Age: 56
DRG: 291 | End: 2021-10-24
Payer: MEDICARE

## 2021-10-24 PROBLEM — I50.33 ACUTE ON CHRONIC DIASTOLIC CONGESTIVE HEART FAILURE (HCC): Status: ACTIVE | Noted: 2017-07-18

## 2021-10-24 LAB
ALLEN TEST: POSITIVE
ALLEN TEST: POSITIVE
ANION GAP SERPL CALCULATED.3IONS-SCNC: 11 MMOL/L (ref 9–17)
BUN BLDV-MCNC: 12 MG/DL (ref 6–20)
BUN/CREAT BLD: ABNORMAL (ref 9–20)
CALCIUM SERPL-MCNC: 8.4 MG/DL (ref 8.6–10.4)
CHLORIDE BLD-SCNC: 97 MMOL/L (ref 98–107)
CO2: 32 MMOL/L (ref 20–31)
CREAT SERPL-MCNC: 0.9 MG/DL (ref 0.7–1.2)
FIO2: 30
FIO2: 4
GFR AFRICAN AMERICAN: >60 ML/MIN
GFR NON-AFRICAN AMERICAN: >60 ML/MIN
GFR SERPL CREATININE-BSD FRML MDRD: ABNORMAL ML/MIN/{1.73_M2}
GFR SERPL CREATININE-BSD FRML MDRD: ABNORMAL ML/MIN/{1.73_M2}
GLUCOSE BLD-MCNC: 103 MG/DL (ref 75–110)
GLUCOSE BLD-MCNC: 119 MG/DL (ref 75–110)
GLUCOSE BLD-MCNC: 120 MG/DL (ref 74–100)
GLUCOSE BLD-MCNC: 122 MG/DL (ref 70–99)
HCT VFR BLD CALC: 32.9 % (ref 40.7–50.3)
HEMOGLOBIN: 9.6 G/DL (ref 13–17)
MAGNESIUM: 2.2 MG/DL (ref 1.6–2.6)
MCH RBC QN AUTO: 25.1 PG (ref 25.2–33.5)
MCHC RBC AUTO-ENTMCNC: 29.2 G/DL (ref 28.4–34.8)
MCV RBC AUTO: 85.9 FL (ref 82.6–102.9)
MODE: ABNORMAL
MODE: ABNORMAL
MRSA, DNA, NASAL: NORMAL
NEGATIVE BASE EXCESS, ART: ABNORMAL (ref 0–2)
NEGATIVE BASE EXCESS, ART: ABNORMAL (ref 0–2)
NRBC AUTOMATED: 0 PER 100 WBC
O2 DEVICE/FLOW/%: ABNORMAL
O2 DEVICE/FLOW/%: ABNORMAL
PATIENT TEMP: ABNORMAL
PATIENT TEMP: ABNORMAL
PDW BLD-RTO: 15.1 % (ref 11.8–14.4)
PLATELET # BLD: 155 K/UL (ref 138–453)
PMV BLD AUTO: 10.5 FL (ref 8.1–13.5)
POC HCO3: 34 MMOL/L (ref 21–28)
POC HCO3: 40.9 MMOL/L (ref 21–28)
POC O2 SATURATION: 93 % (ref 94–98)
POC O2 SATURATION: 93 % (ref 94–98)
POC PCO2 TEMP: ABNORMAL MM HG
POC PCO2 TEMP: ABNORMAL MM HG
POC PCO2: 48.6 MM HG (ref 35–48)
POC PCO2: 75.4 MM HG (ref 35–48)
POC PH TEMP: ABNORMAL
POC PH TEMP: ABNORMAL
POC PH: 7.34 (ref 7.35–7.45)
POC PH: 7.45 (ref 7.35–7.45)
POC PO2 TEMP: ABNORMAL MM HG
POC PO2 TEMP: ABNORMAL MM HG
POC PO2: 64.1 MM HG (ref 83–108)
POC PO2: 75.2 MM HG (ref 83–108)
POSITIVE BASE EXCESS, ART: 12 (ref 0–3)
POSITIVE BASE EXCESS, ART: 9 (ref 0–3)
POTASSIUM SERPL-SCNC: 3.3 MMOL/L (ref 3.7–5.3)
RBC # BLD: 3.83 M/UL (ref 4.21–5.77)
SAMPLE SITE: ABNORMAL
SAMPLE SITE: ABNORMAL
SODIUM BLD-SCNC: 140 MMOL/L (ref 135–144)
SPECIMEN DESCRIPTION: NORMAL
TCO2 (CALC), ART: ABNORMAL MMOL/L (ref 22–29)
TCO2 (CALC), ART: ABNORMAL MMOL/L (ref 22–29)
WBC # BLD: 8.9 K/UL (ref 3.5–11.3)

## 2021-10-24 PROCEDURE — 6370000000 HC RX 637 (ALT 250 FOR IP): Performed by: NURSE PRACTITIONER

## 2021-10-24 PROCEDURE — 2060000000 HC ICU INTERMEDIATE R&B

## 2021-10-24 PROCEDURE — 71045 X-RAY EXAM CHEST 1 VIEW: CPT

## 2021-10-24 PROCEDURE — 80048 BASIC METABOLIC PNL TOTAL CA: CPT

## 2021-10-24 PROCEDURE — 6360000002 HC RX W HCPCS: Performed by: INTERNAL MEDICINE

## 2021-10-24 PROCEDURE — 94660 CPAP INITIATION&MGMT: CPT

## 2021-10-24 PROCEDURE — 6370000000 HC RX 637 (ALT 250 FOR IP): Performed by: INTERNAL MEDICINE

## 2021-10-24 PROCEDURE — 99232 SBSQ HOSP IP/OBS MODERATE 35: CPT | Performed by: INTERNAL MEDICINE

## 2021-10-24 PROCEDURE — 94640 AIRWAY INHALATION TREATMENT: CPT

## 2021-10-24 PROCEDURE — 85027 COMPLETE CBC AUTOMATED: CPT

## 2021-10-24 PROCEDURE — 83735 ASSAY OF MAGNESIUM: CPT

## 2021-10-24 PROCEDURE — 82947 ASSAY GLUCOSE BLOOD QUANT: CPT

## 2021-10-24 PROCEDURE — 36600 WITHDRAWAL OF ARTERIAL BLOOD: CPT

## 2021-10-24 PROCEDURE — 82803 BLOOD GASES ANY COMBINATION: CPT

## 2021-10-24 PROCEDURE — 36415 COLL VENOUS BLD VENIPUNCTURE: CPT

## 2021-10-24 PROCEDURE — 2580000003 HC RX 258: Performed by: NURSE PRACTITIONER

## 2021-10-24 PROCEDURE — 87641 MR-STAPH DNA AMP PROBE: CPT

## 2021-10-24 RX ORDER — FUROSEMIDE 10 MG/ML
40 INJECTION INTRAMUSCULAR; INTRAVENOUS 2 TIMES DAILY
Status: DISCONTINUED | OUTPATIENT
Start: 2021-10-24 | End: 2021-10-25

## 2021-10-24 RX ORDER — POTASSIUM CHLORIDE 7.45 MG/ML
10 INJECTION INTRAVENOUS
Status: COMPLETED | OUTPATIENT
Start: 2021-10-24 | End: 2021-10-24

## 2021-10-24 RX ORDER — LOSARTAN POTASSIUM 50 MG/1
50 TABLET ORAL DAILY
Status: DISCONTINUED | OUTPATIENT
Start: 2021-10-24 | End: 2021-10-29 | Stop reason: HOSPADM

## 2021-10-24 RX ORDER — CLONIDINE 0.1 MG/24H
1 PATCH, EXTENDED RELEASE TRANSDERMAL WEEKLY
Status: DISCONTINUED | OUTPATIENT
Start: 2021-10-24 | End: 2021-10-31

## 2021-10-24 RX ORDER — ALBUTEROL SULFATE 2.5 MG/3ML
2.5 SOLUTION RESPIRATORY (INHALATION) EVERY 6 HOURS PRN
Status: DISCONTINUED | OUTPATIENT
Start: 2021-10-24 | End: 2021-10-29 | Stop reason: HOSPADM

## 2021-10-24 RX ORDER — HYDRALAZINE HYDROCHLORIDE 20 MG/ML
10 INJECTION INTRAMUSCULAR; INTRAVENOUS EVERY 6 HOURS PRN
Status: DISCONTINUED | OUTPATIENT
Start: 2021-10-24 | End: 2021-10-25

## 2021-10-24 RX ORDER — CARVEDILOL 3.12 MG/1
3.12 TABLET ORAL 2 TIMES DAILY WITH MEALS
Status: DISCONTINUED | OUTPATIENT
Start: 2021-10-24 | End: 2021-10-29 | Stop reason: HOSPADM

## 2021-10-24 RX ORDER — POTASSIUM BICARBONATE 25 MEQ/1
50 TABLET, EFFERVESCENT ORAL 2 TIMES DAILY
Status: DISCONTINUED | OUTPATIENT
Start: 2021-10-24 | End: 2021-10-29 | Stop reason: HOSPADM

## 2021-10-24 RX ORDER — OXYBUTYNIN CHLORIDE 5 MG/1
5 TABLET ORAL 2 TIMES DAILY
Status: DISCONTINUED | OUTPATIENT
Start: 2021-10-24 | End: 2021-10-29 | Stop reason: HOSPADM

## 2021-10-24 RX ORDER — CARVEDILOL 6.25 MG/1
6.25 TABLET ORAL 2 TIMES DAILY WITH MEALS
Status: DISCONTINUED | OUTPATIENT
Start: 2021-10-24 | End: 2021-10-24

## 2021-10-24 RX ORDER — LABETALOL HYDROCHLORIDE 5 MG/ML
10 INJECTION, SOLUTION INTRAVENOUS EVERY 4 HOURS PRN
Status: DISCONTINUED | OUTPATIENT
Start: 2021-10-24 | End: 2021-10-24

## 2021-10-24 RX ADMIN — PREGABALIN 150 MG: 75 CAPSULE ORAL at 21:41

## 2021-10-24 RX ADMIN — OXYBUTYNIN CHLORIDE 5 MG: 5 TABLET ORAL at 21:41

## 2021-10-24 RX ADMIN — POTASSIUM CHLORIDE 10 MEQ: 7.46 INJECTION, SOLUTION INTRAVENOUS at 12:25

## 2021-10-24 RX ADMIN — ENOXAPARIN SODIUM 40 MG: 40 INJECTION SUBCUTANEOUS at 21:41

## 2021-10-24 RX ADMIN — POTASSIUM CHLORIDE 10 MEQ: 7.46 INJECTION, SOLUTION INTRAVENOUS at 13:25

## 2021-10-24 RX ADMIN — FUROSEMIDE 40 MG: 10 INJECTION, SOLUTION INTRAMUSCULAR; INTRAVENOUS at 09:03

## 2021-10-24 RX ADMIN — IPRATROPIUM BROMIDE AND ALBUTEROL SULFATE 1 AMPULE: .5; 2.5 SOLUTION RESPIRATORY (INHALATION) at 11:29

## 2021-10-24 RX ADMIN — SODIUM CHLORIDE, PRESERVATIVE FREE 10 ML: 5 INJECTION INTRAVENOUS at 09:23

## 2021-10-24 RX ADMIN — POTASSIUM CHLORIDE 10 MEQ: 7.46 INJECTION, SOLUTION INTRAVENOUS at 10:53

## 2021-10-24 RX ADMIN — POTASSIUM BICARBONATE 50 MEQ: 977.5 TABLET, EFFERVESCENT ORAL at 21:41

## 2021-10-24 RX ADMIN — POTASSIUM CHLORIDE 10 MEQ: 7.46 INJECTION, SOLUTION INTRAVENOUS at 09:59

## 2021-10-24 RX ADMIN — QUETIAPINE FUMARATE 25 MG: 25 TABLET ORAL at 21:41

## 2021-10-24 RX ADMIN — SODIUM CHLORIDE, PRESERVATIVE FREE 10 ML: 5 INJECTION INTRAVENOUS at 21:41

## 2021-10-24 RX ADMIN — IPRATROPIUM BROMIDE AND ALBUTEROL SULFATE 1 AMPULE: .5; 2.5 SOLUTION RESPIRATORY (INHALATION) at 09:34

## 2021-10-24 RX ADMIN — FUROSEMIDE 40 MG: 10 INJECTION, SOLUTION INTRAMUSCULAR; INTRAVENOUS at 16:54

## 2021-10-24 RX ADMIN — ENOXAPARIN SODIUM 40 MG: 40 INJECTION SUBCUTANEOUS at 09:07

## 2021-10-24 ASSESSMENT — PAIN SCALES - GENERAL
PAINLEVEL_OUTOF10: 0

## 2021-10-24 NOTE — PROGRESS NOTES
[facility]  Office: [address], DO, Jeovanny Nationy, DO, Abad [facility], DO, Bradford Huntley Blood, DO, Cinda Morelos MD, Raúl Lane MD, Marcus Fajardo MD, Aydee Pardo MD, Nydia Ambrose MD, Merrick Shaw MD, Mahesh Briseno MD, Libia Wright MD, Cali Perez, DO, Niraj Menon, DO, Teresa Melendez MD,  Enzo Sheikh DO, Rosette Harvey MD, Elizabeth Abel MD, Dana Pickard MD, Nabila Clarke MD, Paul Rice MD, Mickey Pfeiffer MD, Kena Sterling, [facility], [facility], [facility], Avery Steve, CNP, Joshua Seaman, [facility], Johny Bahena, CNP, Gunnar Clark, Lina Anthony, CNP,  Lesch, CNP, Adrian Medina, [facility], Gabe Saenz, CNP, Dia Casey PA-C, Denver Parks, [facility], Rosalino Wong, CNP, Oliver West, CNP, Giulia Serna, CNP, Darryl Plunkett, CNP, Mickey Dixon, CNP, Clair Alejandra, CNP, James Morton, [address] [place] [address]    Progress Note    10/24/2021    12:48 PM    Name:   Otilia Bennett  MRN:     [id number]     Acct:      [de-identified]   Room:   [address] [place] [address] Day:  2  Admit Date:  10/21/2021  9:44 PM    PCP:   Jennifer Mendiola MD  Code Status:  Full Code    Subjective:     C/C:   Chief Complaint   Patient presents with    Shortness of Breath    Fever     Interval History Status: improved. Patient seen and examined today, still confused but mentation seems to be better this morning, he seems to have not been as distractible as well. Patient diuresed well, kidney function still adequate. Infectious disease discontinued antibiotics this morning. Brief History:     From H&P  Otilia Bennett is a 61 y.o. Non- / non  male who presents with Shortness of Breath and Fever   and is admitted to the hospital for the management of Pneumonia.   62-year-old male past medical history of urinary tract infection, type 2 diabetes, hyperlipidemia, prior alcohol abuse, depression, hypothyroidism, morbid obesity, bipolar disorder, anxiety, obstructive sleep apnea, venous insufficiency of bilateral lower extremities, history of alcoholic cirrhosis, history of portal hypertension, chronic dependence of supplemental oxygen, diastolic heart failure presents with shortness of breath with concern for pneumonia seen on chest x-ray at outlying facility. Patient was transported to [address] [place] [address] for further care. Of note patient was recently discharged from another [facility] due to urinary tract infection and was discharged on cipro. Review of Systems:     Constitutional:  negative for chills, fevers, sweats  Respiratory:  negative for cough, dyspnea on exertion, shortness of breath, wheezing  Cardiovascular:  negative for chest pain, chest pressure/discomfort, lower extremity edema, palpitations  Gastrointestinal:  negative for abdominal pain, constipation, diarrhea, nausea, vomiting  Neurological:  negative for dizziness, headache    Medications: Allergies:     Allergies   Allergen Reactions    No Known Allergies        Current Meds:   Scheduled Meds:    furosemide  40 mg IntraVENous BID    potassium bicarbonate  50 mEq Oral BID    losartan  50 mg Oral Daily    oxybutynin  5 mg Oral BID    cloNIDine  1 patch TransDERmal Weekly    carvedilol  3.125 mg Oral BID WC    potassium chloride  10 mEq IntraVENous Q1H    QUEtiapine  25 mg Oral Nightly    enoxaparin  40 mg SubCUTAneous BID    clonazePAM  1 mg Oral BID    sodium chloride flush  5-40 mL IntraVENous 2 times per day    ipratropium-albuterol  1 ampule Inhalation Q4H WA    insulin lispro  0-6 Units SubCUTAneous TID WC    insulin lispro  0-3 Units SubCUTAneous Nightly    DULoxetine  60 mg Oral QAM    pregabalin  150 mg Oral BID    aspirin  81 mg Oral Daily    levothyroxine  175 mcg Oral Daily    tamsulosin  0.4 mg Oral Daily    pantoprazole  40 mg Oral QAM AC     Continuous Infusions:    sodium chloride      dextrose       PRN Meds: hydrALAZINE, sodium chloride flush, sodium chloride, ondansetron **OR** ondansetron, magnesium hydroxide, acetaminophen **OR** acetaminophen, albuterol, glucose, dextrose, glucagon (rDNA), dextrose, albuterol sulfate HFA    Data:     Past Medical History:   has a past medical history of Anxiety and depression, Back pain, chronic, BPH (benign prostatic hyperplasia), CHF (congestive heart failure) ([facility] [address] Cirrhosis ([facility] Diabetes mellitus ([facility] GERD (gastroesophageal reflux disease), H/O cardiac catheterization, Hepatitis, Hiatal hernia, History of alcohol abuse, Hyperlipidemia, Hypertension, IBS (irritable bowel syndrome), Morbid [facility] ([facility] Neuropathy, On home oxygen therapy, Pancreatitis, Primary osteoarthritis of right knee, Sleep apnea, Thyroid disease, and Urinary bladder neurogenic dysfunction. Social History:   reports that he has never smoked. He has never used smokeless tobacco. He reports that he does not drink alcohol and does not use drugs. Family History:   Family History   Adopted: Yes       Vitals:  BP (!) 163/78   Pulse 56   Temp 97.5 °F (36.4 °C) (Axillary)   Resp 21   Ht 5' 10\" (1.778 m)   Wt (!) 399 lb 14.6 oz (181.4 kg)   SpO2 97%   BMI 57.38 kg/m²   Temp (24hrs), Av.8 °F (36.6 °C), Min:97.3 °F (36.3 °C), Max:98.4 °F (36.9 °C)    Recent Labs     10/23/21  1226 10/23/21  1640 10/23/21  2034 10/24/21  1202   POCGLU 111* 118* 111* 119*       I/O (24Hr):     Intake/Output Summary (Last 24 hours) at 10/24/2021 1248  Last data filed at 10/24/2021 1116  Gross per 24 hour   Intake 404 ml   Output 3750 ml   Net -3346 ml       Labs:  Hematology:  Recent Labs     10/21/21  2213 10/23/21  0704 10/24/21  0635   WBC 10.1 8.8 8.9   RBC 3.70* 3.72* 3.83*   HGB 9.2* 9.5* 9.6*   HCT 30.6* 33.4* 32.9*   MCV 82.7 89.8 85.9   MCH 24.9* 25.5 25.1*   MCHC 30.1 28.4 29.2   RDW 15.5* 15.2* 15.1*    144 155   MPV 10.4 10.4 10.5     Chemistry:  Recent Labs     10/21/21  2213 10/21/21  2322 10/22/21  0025 10/23/21  0704 10/24/21  0635     --   --  144 140   K 3.1*  --   --  3.3* 3.3*   CL 97*  --   --  96* 97*   CO2 33*  --   --  36* 32*   GLUCOSE 124*  --   --  119* 122*   BUN 13  --   --  12 12   CREATININE 1.08  --   --  0.98 0.90   MG  --   --   --  2.0 2.2   ANIONGAP 12  --   --  12 11   LABGLOM >60  --   --  >60 >60   GFRAA >60  --   --  >60 >60   CALCIUM 8.6  --   --  8.4* 8.4*   PROBNP  --   --   --  957*  --    TROPHS 57* 54* 59*  --   --      Recent Labs     10/21/21  2213 10/21/21  2244 10/22/21  0729 10/22/21  2022 10/23/21  0837 10/23/21  1226 10/23/21  1640 10/23/21  2034 10/24/21  1202   PROT 7.4  --   --   --   --   --   --   --   --    LABALBU 3.1*  --   --   --   --   --   --   --   --    TSH 16.83*  --   --   --   --   --   --   --   --    AST 25  --   --   --   --   --   --   --   --    ALT 20  --   --   --   --   --   --   --   --    ALKPHOS 252*  --   --   --   --   --   --   --   --    BILITOT 0.63  --   --   --   --   --   --   --   --    AMMONIA  --  34  --   --   --   --   --   --   --    POCGLU  --   --    < > 99 119* 111* 118* 111* 119*    < > = values in this interval not displayed. ABG:  Lab Results   Component Value Date    POCPH 7.342 10/24/2021    PHART 7.330 06/18/2014    POCPCO2 75.4 10/24/2021    SZO8ZDU 68.0 06/18/2014    POCPO2 75.2 10/24/2021    PO2ART 84.0 06/18/2014    POCHCO3 40.9 10/24/2021    HSI5KQS 34.9 06/18/2014    NBEA NOT REPORTED 10/24/2021    PBEA 12 10/24/2021    MBU1BJQ NOT REPORTED 10/24/2021    ZIUT2ECV 93 10/24/2021    K7EKJCJH 96.5 06/18/2014    FIO2 4.0 10/24/2021     Lab Results   Component Value Date/Time    SPECIAL NOT REPORTED 10/21/2021 10:37 PM     Lab Results   Component Value Date/Time    CULTURE NO GROWTH 10/21/2021 10:37 PM       Radiology:  CT HEAD WO CONTRAST    Result Date: 10/22/2021  No acute intracranial abnormality. MRI may be obtained if clinically indicated.      CT ABDOMEN PELVIS W IV CONTRAST Additional Contrast? None    Result Date: 10/22/2021  Nonspecific presacral stranding. Otherwise no evidence of acute infectious or inflammatory process in the abdomen or pelvis. Cirrhosis with splenomegaly. XR CHEST PORTABLE    Result Date: 10/21/2021  1. Cardiomegaly 2. Interval worsening of the interstitial alveolar opacities bilaterally, differential considerations include worsening edema versus infection 3. Small right pleural effusion     CT CHEST PULMONARY EMBOLISM W CONTRAST    Result Date: 10/22/2021  1. Suboptimal evaluation of the subsegmental and more peripheral pulmonary arteries due to motion artifact. Given this, no obvious acute pulmonary thromboembolic disease. Prominent main pulmonary artery suggests pulmonary hypertension. 2.  Multifocal bilateral heterogeneous pulmonary opacities. Findings may represent a combination of atelectasis and pulmonary edema. Multifocal pneumonia is an additional consideration. 3.  Small bilateral pleural effusions with associated lower lobe relaxation atelectasis. 4.  Round 2.5 cm nodule in the subcutaneous soft tissues posterior to the left nipple demonstrates simple fluid attenuation and may represent a benign cyst.  This could be further evaluated with focused ultrasound as warranted.        Physical Examination:        General appearance:  alert, cooperative and no distress  Mental Status:  oriented to person, place and time and normal affect  Lungs:  clear to auscultation bilaterally, normal effort  Heart:  regular rate and rhythm, no murmur  Abdomen:  soft, nontender, nondistended, normal bowel sounds, no masses, hepatomegaly, splenomegaly  Extremities:  no edema, redness, tenderness in the calves  Skin:  no gross lesions, rashes, induration    Assessment:        Hospital Problems         Last Modified POA    * (Principal) Pneumonia 68/29/2886 Yes    Alcoholic cirrhosis of liver ([facility] sober since 2013 10/22/2021 Yes    Bipolar disorder ([facility] 10/22/2021 Yes    Type 2 diabetes mellitus with diabetic neuropathy, with long-term current use of insulin ([address] 10/22/2021 Yes    FABI (obstructive sleep apnea) 10/22/2021 Yes    Primary osteoarthritis of right knee (Chronic) 10/22/2021 Yes    Type 2 diabetes mellitus with diabetic neuropathy ([address] (Chronic) 10/22/2021 Yes    Acquired hypothyroidism 10/22/2021 Yes    Urine retention 10/22/2021 Yes    Portal hypertension ([address] (Chronic) 10/22/2021 Yes    Venous stasis dermatitis of both lower extremities (Chronic) 10/22/2021 Yes    Chronic indwelling Clemons catheter (Chronic) 10/22/2021 Yes    Anxiety and depression 10/22/2021 Yes    BPH (benign prostatic hyperplasia) 10/22/2021 Yes    Morbid obesity ([address] 10/22/2021 Yes    On home oxygen therapy 10/22/2021 Yes    Overview Signed 11/4/2020  2:08 PM by SUNSHINE Nagy - NP     prn               Plan:        Acute respiratory failure with hypoxia -likely pulmonary edema, Lasix 40 mg given yesterday with 4 L diuresis, continue 40 mg daily IV. Multifocal pneumoniapatient is diuresing well, discontinue antibiotics  Hallucinationspossibly withdrawal from benzodiazepines, give 1 dose of Klonopin, cautious benzodiazepine as patient is also a chronic CO2 retainer.   Start Catapres  Hypothyroidismcontinue Synthroid  Urinary retention f/u outpatient  Type 2 diabetescontinue sliding scale and POC glucose  Super morbid obesity  FABI  Bipolar disorder    Nedra Vidal DO  10/24/2021  12:48 PM

## 2021-10-24 NOTE — PLAN OF CARE
Problem: RESPIRATORY  Intervention: Respiratory assessment  Note: BRONCHOSPASM/BRONCHOCONSTRICTION    IMPROVE  AERATION/BREATHSOUNDS  ADMINISTER BRONCHODILATOR THERAPY AS APPROPRIATE  ASSESS BREATH SOUNDS  PATIENT EDUCATION AS NEEDED     PROVIDE ADEQUATE OXYGENATION WITH ACCEPTABLE SP02/ABG'S    [x]  IDENTIFY APPROPRIATE OXYGEN THERAPY  [x]   MONITOR SP02/ABG'S AS NEEDED   [x]   PATIENT EDUCATION AS NEEDED

## 2021-10-24 NOTE — FLOWSHEET NOTE
SPIRITUAL CARE PROGRESS NOTE    Spiritual Assessment:  encountered patient as part of spiritual care rounds. Patient's door was open and was watching television when the  arrived. Patient was approachable, however, at times  had difficulty interpreting the patient/following the conversation. However, patient was able to share his feelings with the  and named \"the big man upstairs\" as whom he trusts. Patient shared he prays and acklowedged it helps him. Patient also shared having children. Intervention:  used active listening to affirm patient's feelings.  nurtured hope to patient during the encounter. Outcome: Chaplains are available for specific request visits at any time and may be paged via [facility].         10/23/21 2153   Encounter Summary   Services provided to: Family   Referral/Consult From: [address] Unknown   Continue Visiting   (10/23/2021)   Complexity of Encounter Low   Length of Encounter 15 minutes   Routine   Type Initial   Assessment Approachable;Calm;Coping   Intervention Active listening;Nurtured hope;Sustaining presence/ Ministry of presence   Outcome Receptive   Spiritual/Anabaptist   Type Spiritual support     Electronically signed by Merle Cho on 10/23/2021 at 9:58 PM.  [facility]  [phone]

## 2021-10-24 NOTE — PROGRESS NOTES
[facility]  Progress Note    Marivel Conner  MRN: [id number]  Date: 10/24/2021  LOS: 2     Reason for F/U :   Possible pneumonia    Impression :   Chronic hypoxic and hypercapnic respiratory failure  Morbid obesity with BMI of 57  Diabetes mellitus type 2  Essential hypertension  Diastolic congestive heart failure  Multifocal changes in the bilateral lungs  Bilateral pleural effusion    Recommendations: The patient is being treated for CHF  Clinically the patient is doing well and does not have any evidence of pneumonia. Continue off antimicrobial therapy for now    Infection Control Recommendations:   Universal precautions    Discharge Planning:   Patient will need Midline Catheter Insertion/ PICC line Insertion: No  Patient will need: Home IV , [place],  SNF,  LTAC: Undetermined  Patient willneed outpatient wound care: No    Medical Decision making / Summary of Stay:   Marivel Conner is a 61y.o.-year-old  male who was initially admitted on 10/21/2021. Patient seen at the request of      INITIAL HISTORY:     Patient is known to our service. Previously treated by Dr Claude Byes during his admission to [facility] on 11/4/2020 because of toxic metabolic encephalopathy and UTI with E coli and Enterococus.       Patient has known medical history of morbid obesity, pancreatitis, urinary retention status post indwelling Clemons catheter placement, CHF, diabetes mellitus type 2, essential hypertension, morbid obesity with BMI of 57.     Patient had a urine culture at [facility] in August 2019 that was positive for Enterococcus, E. coli, Pseudomonas. He had a urine culture at [facility] in January 2020 with E. coli and Enterococcus.      More recently he had E. coli and Enterococcus in urine on 11-6-20.      Then Klebsiella pneumoniae on 10-14-21 and 10-15-21 in blood and urine. The patient was admitted to [facility] and treated with Cipro for the above infection.  He had Clemons exchanged and was discharged to a SNF to complete treatment with Cipro. His CXR on 10-15-21 did not show any infiltrates.     Current cultures of blood and urine on 10-21-21 show no growth but patient's CXR shows multifocal bilateral heterogenous opacities, bilateral posterior lobe consolidations and small pleural effusions. Findings interpreted by Radiologist as a combination of pulmonary edema and atelectasis with also the possibility of multifocal pneumonia. My own interpretation is that they are caused by fluid retention and atelectasis and not by bacterial pneumonia.     WBC is normal. He is afebrile and not expectorating sputum. ProBNP and troponins are elevated.     I would D/C Zosyn as it will add to fluid and Na loads. He has no Hx of MRSA. Current evaluation:10/24/2021    BP (!) 141/69   Pulse 52   Temp 98.2 °F (36.8 °C) (Axillary)   Resp 16   Ht 5' 10\" (1.778 m)   Wt (!) 399 lb 14.6 oz (181.4 kg)   SpO2 100%   BMI 57.38 kg/m²     Temperature Range: Temp: 98.2 °F (36.8 °C) Temp  Av.1 °F (36.7 °C)  Min: 97.4 °F (36.3 °C)  Max: 98.6 °F (37 °C)  The patient is seen and evaluated at bedside he is awake and alert but seems somewhat pleasantly confused. At times is difficult for me to understand what he is saying as his speech is incoherent and    Review of Systems   Unable to perform ROS: Other       Physical Examination :     Physical Exam  Constitutional:       Appearance: He is well-developed. He is obese. HENT:      Head: Normocephalic and atraumatic. Cardiovascular:      Rate and Rhythm: Normal rate. Heart sounds: Murmur heard. No friction rub. No gallop. Pulmonary:      Effort: Pulmonary effort is normal.      Breath sounds: Normal breath sounds. No wheezing. Abdominal:      General: Bowel sounds are normal.      Palpations: Abdomen is soft. There is no mass. Tenderness: There is no abdominal tenderness. Musculoskeletal:         General: Normal range of motion.       Cervical back: Neck supple. Comments: Venous stasis skin changes   Lymphadenopathy:      Cervical: No cervical adenopathy. Skin:     General: Skin is warm and dry. Neurological:      Mental Status: He is alert. Laboratory data:   I have independently reviewed the followinglabs:  CBC with Differential:   Recent Labs     10/21/21  2213 10/21/21  2213 10/23/21  0704 10/24/21  0635   WBC 10.1   < > 8.8 8.9   HGB 9.2*   < > 9.5* 9.6*   HCT 30.6*   < > 33.4* 32.9*      < > 144 155   LYMPHOPCT 9*  --   --   --    MONOPCT 8  --   --   --     < > = values in this interval not displayed. BMP:   Recent Labs     10/23/21  0704 10/24/21  0635    140   K 3.3* 3.3*   CL 96* 97*   CO2 36* 32*   BUN 12 12   CREATININE 0.98 0.90   MG 2.0 2.2     Hepatic Function Panel:   Recent Labs     10/21/21  2213   PROT 7.4   LABALBU 3.1*   BILITOT 0.63   ALKPHOS 252*   ALT 20   AST 25         Lab Results   Component Value Date    PROCAL 2.33 10/21/2021    PROCAL 0.15 11/06/2020     Lab Results   Component Value Date    CRP 42.3 11/06/2020    CRP 74.3 08/20/2017    CRP 66.1 05/08/2016     Lab Results   Component Value Date    SEDRATE 77 (H) 11/06/2020         Lab Results   Component Value Date    DDIMER 2.49 05/04/2016     Lab Results   Component Value Date    FERRITIN 109 05/07/2016     No results found for: LDH  No results found for: FIBRINOGEN    Results in Past 30 Days  Result Component Current Result Ref Range Previous Result Ref Range   SARS-CoV-2, Rapid Not Detected (10/21/2021) Not Detected Not Detected (10/20/2021) Not Detected     Lab Results   Component Value Date    COVID19 Not Detected 10/21/2021    COVID19 Not Detected 10/20/2021    COVID19 Not Detected 10/15/2021       No results for input(s): VANCOTROUGH in the last 72 hours.     Imaging Studies:   CT OF THE HEAD WITHOUT CONTRAST  10/22/2021 12:10 am  Impression   No acute intracranial abnormality.  MRI may be obtained if clinically   indicated.     Cultures:     Culture, Blood 1 [[id number]] Collected: 10/21/21 2215   Order Status: Completed Specimen: Blood Updated: 10/23/21 0830    Specimen Description . BLOOD    Special Requests  R HAND 10 ML    Culture NO GROWTH 2 DAYS   Culture, Blood 1 [[id number]] Collected: 10/21/21 2214   Order Status: Completed Specimen: Blood Updated: 10/23/21 0830    Specimen Description . BLOOD    Special Requests  L AC 20 ML    Culture NO GROWTH 2 DAYS   MRSA DNA Probe, Nasal [[id number]]    Order Status: No result Specimen: Nares    Culture, Urine [[id number]] Collected: 10/21/21 2237   Order Status: Completed Specimen: Urine, clean catch Updated: 10/22/21 1840    Specimen Description . CLEAN CATCH URINE    Special Requests NOT REPORTED    Culture NO GROWTH   Respiratory Panel, Molecular, with COVID-19 (Restricted: peds pts or suitable admitted adults) [[id number]]    Order Status: No result Specimen: Nasopharyngeal    Sputum gram stain [[id number]]    Order Status: No result Specimen: Sputum Expectorated    Respiratory Culture [[id number]]    Order Status: No result Specimen: Sputum Expectorated    COVID-19, Rapid [[id number]] Collected: 10/21/21 2214   Order Status: Completed Specimen: Nasopharyngeal Swab Updated: 10/21/21 2245    Specimen Description . NASOPHARYNGEAL SWAB    SARS-CoV-2, Rapid Not Detected    Comment:        Rapid NAAT:  The specimen is NEGATIVE for SARS-CoV-2, the novel coronavirus associated with   COVID-19.         The ID NOW COVID-19 assay is designed to detect the virus that causes COVID-19 in patients   with signs and symptoms of infection who are suspected of COVID-19. An individual without symptoms of COVID-19 and who is not shedding SARS-CoV-2 virus would   expect to have a negative (not detected) result in this assay.    Negative results should be treated as presumptive and, if inconsistent with clinical signs   and symptoms or necessary for patient management,   should be tested with an alternative molecular assay. Negative results do not preclude   SARS-CoV-2 infection and   should not be used as the sole basis for patient management decisions.         Fact sheet for Healthcare Providers: Tristan   Fact sheet for Patients: Tristan           Methodology: Isothermal Nucleic Acid Amplification             Medications:      furosemide  40 mg IntraVENous BID    potassium bicarbonate  50 mEq Oral Daily    QUEtiapine  25 mg Oral Nightly    enoxaparin  40 mg SubCUTAneous BID    clonazePAM  1 mg Oral BID    sodium chloride flush  5-40 mL IntraVENous 2 times per day    ipratropium-albuterol  1 ampule Inhalation Q4H WA    insulin lispro  0-6 Units SubCUTAneous TID WC    insulin lispro  0-3 Units SubCUTAneous Nightly    DULoxetine  60 mg Oral QAM    pregabalin  150 mg Oral BID    aspirin  81 mg Oral Daily    levothyroxine  175 mcg Oral Daily    tamsulosin  0.4 mg Oral Daily    metoprolol tartrate  25 mg Oral BID    pantoprazole  40 mg Oral QAM            [facility]  Scott Odom MD  [employer] messaging  OFFICE: [phone]      Electronically signed by Scott Odom MD on 10/24/2021 at 8:31 AM  Thank you for allowing us to participate in the care of this patient. Please call with questions. This note iscreated with the assistance of a speech recognition program.  While intending to generate a document that actually reflects the content of the visit, the document can still have some errors including those of syntax andsound a like substitutions which may escape proof reading. In such instances, actual meaning can be extrapolated by contextual diversion.

## 2021-10-24 NOTE — PROGRESS NOTES
([facility] [address]     not checking bloodsugar at home    GERD (gastroesophageal reflux disease)     H/O cardiac catheterization not sure of date    no stents    Hepatitis     Pt does not know the type.      Hiatal hernia     History of alcohol abuse     Sober since 2013    Hyperlipidemia     not taking any medication    Hypertension     IBS (irritable bowel syndrome)     Morbid obesity ([address]     Neuropathy     feet,toes    On home oxygen therapy     prn    Pancreatitis     x 5 episodes    Primary osteoarthritis of right knee     Sleep apnea     No machine    Thyroid disease     Urinary bladder neurogenic dysfunction      PAST SURGICAL HISTORY:        Procedure Laterality Date    ABDOMEN SURGERY      hernia repair x4 (ventral)    COLONOSCOPY      2012    CYSTOSCOPY  01/24/2018    CYSTOSCOPY  02/20/2019    CYSTOSCOPY N/A 2/20/2019    CYSTOSCOPY DILATION [facility] Mali Martell [facility] [address] [facility] 8/23/2019    CYSTOSCOPY/ DILATION NICOLE CATHETER EXCHANGE performed by Mali Martell MD at [address] [facility], COLON, DIAGNOSTIC     [address]  05/20/2018    repair and reduction of recurrent incarcerated incisional hernia with mesh    NM CYSTOURETHROSCOPY N/A 1/24/2018    CYSTOSCOPY Shane Sinks performed by Mali Martell MD at [address] Bilateral 8/22/2017    FOOT [address] with bone bx performed by Jayla Hahn DPM at [address] EGD TRANSORAL BIOPSY SINGLE/MULTIPLE N/A 7/19/2017    EGD BIOPSY performed by Familia Vang MD [address]  07/19/2017    polypectomy    UPPER GASTROINTESTINAL ENDOSCOPY N/A 3/14/2019    EGD BIOPSY performed by Harrold Aschoff, MD at [address] [facility] N/A 5/20/2018    REPAIR AND REDUCTION OF RECURRENT INCARCERATED INCISIONAL HERNIA WITH MESH performed by Lawrence Lyon MD at [place] [address] HISTORY:       Adopted: Yes     SOCIAL HISTORY:   TOBACCO:   reports that he has never smoked. He has never used smokeless tobacco.  ETOH:  reports no history of alcohol use. DRUGS: reports no history of drug use. AVOCATION/OCCUPATIONAL EXPOSURE:    The patient denies asbestos, silica dust, coal, foundry, quarry or [employer] exposure. The patient denies  to having pet dogs, cats, turtles or exotic birds at home. There is no history of TB or TB exposure. There is no exposure to sick contacts. Travel history is not significant history of risk factors for pulmonary disease. The patient denies using [employer]. ALLERGIES:    Allergies   Allergen Reactions    No Known Allergies          HOME MEDICATIONS:  Prior to Admission medications    Medication Sig Start Date End Date Taking? Authorizing Provider   ciprofloxacin (CIPRO) 500 MG tablet Take 1 tablet by mouth every 12 hours for 10 days 10/18/21 10/28/21  Faith Samayoa DO   insulin glargine (BASAGLAR KWIKPEN) 100 UNIT/ML injection pen Inject 76 Units into the skin 2 times daily    Historical Provider, MD   pregabalin (LYRICA) 150 MG capsule Take 150 mg by mouth 2 times daily. Historical Provider, MD   ibuprofen (ADVIL;MOTRIN) 600 MG tablet Take 600 mg by mouth every 6 hours as needed for Pain    Historical Provider, MD   SITagliptin (JANUVIA) 100 MG tablet Take 100 mg by mouth daily    Historical Provider, MD   glipiZIDE (GLUCOTROL) 10 MG tablet Take 10 mg by mouth 2 times daily (before meals)    Historical Provider, MD   metFORMIN (GLUCOPHAGE) 500 MG tablet Take 500 mg by mouth 2 times daily (with meals)    Historical Provider, MD   nystatin (MYCOSTATIN) 226815 UNIT/GM cream Apply topically 2 times daily as needed (to skin folds)    Historical Provider, MD   clonazePAM (KLONOPIN) 1 MG tablet Take 1 tablet by mouth 2 times daily as needed for Anxiety for up to 3 days.  1/14/21 1/17/21  Linda Monroe MD   oxyCODONE-acetaminophen (PERCOCET) 7.5-325 MG per tablet Take 1 tablet by mouth every 6 hours as needed for Pain. ; Dispense 30 1/5/21   Historical Provider, MD   QUEtiapine (SEROQUEL XR) 50 MG extended release tablet Take 50 mg by mouth nightly    Historical Provider, MD   triamcinolone (KENALOG) 0.025 % cream Apply topically 2 times daily as needed    Historical Provider, MD   diphenoxylate-atropine (LOMOTIL) 2.5-0.025 MG per tablet Take 1 tablet by mouth 4 times daily as needed for Diarrhea. Historical Provider, MD   fluocinonide (LIDEX) 0.05 % external solution Apply topically as needed (scalp itching)    Historical Provider, MD   ketoconazole (NIZORAL) 2 % shampoo Apply topically Twice a Week    Historical Provider, MD   albuterol sulfate HFA (PROAIR HFA) 108 (90 Base) MCG/ACT inhaler Inhale 2 puffs into the lungs every 6 hours as needed for Wheezing    Historical Provider, MD   tamsulosin (FLOMAX) 0.4 MG capsule Take 1 capsule by mouth daily 1/24/18   Sarah Rodriguez MD   oxybutynin (DITROPAN) 5 MG tablet Take 1 tablet by mouth 2 times daily 12/12/17   Sugar Albert Orlop,    bumetanide (BUMEX) 2 MG tablet Take 2 mg by mouth 2 times daily    Historical Provider, MD   aspirin 81 MG EC tablet Take 1 tablet by mouth daily 7/26/17   Rima Rod DO   nadolol (CORGARD) 40 MG tablet Take 40 mg by mouth daily    Historical Provider, MD   levothyroxine (SYNTHROID) 175 MCG tablet Take 175 mcg by mouth Daily     Historical Provider, MD   nystatin (MYCOSTATIN) 875141 UNIT/GM powder Apply topically 2 times daily as needed TO ABDOMINAL FOLDS, GROIN     Historical Provider, MD   esomeprazole (NEXIUM) 40 MG capsule Take 40 mg by mouth 2 times daily     Historical Provider, MD   DULoxetine (CYMBALTA) 60 MG capsule Take 60 mg by mouth every morning     Historical Provider, MD   venlafaxine (EFFEXOR-XR) 150 MG XR capsule Take 150 mg by mouth daily.     Historical Provider, MD     IMMUNIZATIONS:    There is no immunization history on file for this patient. REVIEW OF SYSTEMS:  Unable to obtain due to drowsiness    PHYSICAL EXAMINATION     VITAL SIGNS:   LAST-  BP (!) 141/69   Pulse 58   Temp 97.3 °F (36.3 °C) (Oral)   Resp 16   Ht 5' 10\" (1.778 m)   Wt (!) 399 lb 14.6 oz (181.4 kg)   SpO2 100%   BMI 57.38 kg/m²   8-24 HR RANGE-  TEMP Temp  Av °F (36.7 °C)  Min: 97.3 °F (36.3 °C)  Max: 98.6 °F (37 °C)   BP Systolic (18PKF), UTS:355 , Min:141 , OEI:280      Diastolic (52TTS), SMO:73, Min:62, Max:90     PULSE Pulse  Av.3  Min: 52  Max: 72   RR Resp  Av  Min: 16  Max: 16   O2 SAT SpO2  Av %  Min: 100 %  Max: 100 %   OXYGEN DELIVERY No data recorded     SYSTEMIC EXAMINATION:   General appearance -appears drowsy, morbidly obese   Mental status -lethargic, oriented x1, restless leg movements  Eyes - pupils equal and reactive, extraocular eye movements intact, sclera anicteric  Ears - not examined  Nose - normal and patent, no erythema, discharge  Mouth - mucous membranes moist, pharynx normal without lesions  Neck - supple, no significant adenopathy not cooperative with JVP checking. Chest -limited exam due to body habitus. Decreased breath sounds throughout. No wheezing, no rales. Heart - normal rate, regular rhythm, normal S1, S2, no murmurs, rubs, clicks or gallops  Abdomen - soft, nontender, nondistended, no masses or organomegaly  Neurological - motor and sensory grossly normal bilaterally  Musculoskeletal - no joint tenderness, deformity or swelling  Extremities - peripheral pulses normal, pitting and nonpitting edema, dermatitis changes on lower extremity.       DATA REVIEW     Medications: Current Inpatient  Scheduled Meds:   furosemide  40 mg IntraVENous BID    potassium bicarbonate  50 mEq Oral BID    losartan  50 mg Oral Daily    oxybutynin  5 mg Oral BID    carvedilol  6.25 mg Oral BID WC    QUEtiapine  25 mg Oral Nightly    enoxaparin  40 mg SubCUTAneous BID    clonazePAM  1 mg Oral BID    sodium chloride flush 5-40 mL IntraVENous 2 times per day    ipratropium-albuterol  1 ampule Inhalation Q4H WA    insulin lispro  0-6 Units SubCUTAneous TID WC    insulin lispro  0-3 Units SubCUTAneous Nightly    DULoxetine  60 mg Oral QAM    pregabalin  150 mg Oral BID    aspirin  81 mg Oral Daily    levothyroxine  175 mcg Oral Daily    tamsulosin  0.4 mg Oral Daily    pantoprazole  40 mg Oral QAM AC     Continuous Infusions:   sodium chloride      dextrose       INPUT/OUTPUT:  In: 284 [I.V.:284]  Out: 2600 [Urine:2600]    LABS:-  ABGs:   No results found for: PH, PCO2, PO2, HCO3, O2SAT  No results for input(s): PHART, PO2ART, LJW8KVD, QDD4HYM, BEART, P8TSDXLR in the last 72 hours. Lab Results   Component Value Date    POCPH 7.398 10/22/2021    POCPCO2 66.6 (H) 10/22/2021    POCPO2 57.8 (L) 10/22/2021    POCHCO3 41.1 (HH) 10/22/2021    JFFQ8KWG 88 (L) 10/22/2021     CBC:   Recent Labs     10/21/21  2213 10/23/21  0704 10/24/21  0635   WBC 10.1 8.8 8.9   HGB 9.2* 9.5* 9.6*   HCT 30.6* 33.4* 32.9*   MCV 82.7 89.8 85.9    144 155   LYMPHOPCT 9*  --   --    RBC 3.70* 3.72* 3.83*   MCH 24.9* 25.5 25.1*   MCHC 30.1 28.4 29.2   RDW 15.5* 15.2* 15.1*     BMP:   Recent Labs     10/21/21  2213 10/23/21  0704 10/24/21  0635    144 140   K 3.1* 3.3* 3.3*   CL 97* 96* 97*   CO2 33* 36* 32*   BUN 13 12 12   CREATININE 1.08 0.98 0.90   GLUCOSE 124* 119* 122*     Liver Function Test:   Recent Labs     10/21/21  2213   PROT 7.4   LABALBU 3.1*   ALT 20   AST 25   ALKPHOS 252*   BILITOT 0.63     Amylase/Lipase:  No results for input(s): AMYLASE, LIPASE in the last 72 hours. Coagulation Profile:   No results for input(s): INR, PROTIME, APTT in the last 72 hours. Cardiac Enzymes:  No results for input(s): CKTOTAL, CKMB, CKMBINDEX, TROPONINI in the last 72 hours.   Lactic Acid:  Lab Results   Component Value Date    LACTA 2.0 11/06/2020    LACTA 1.9 11/04/2020    LACTA 0.8 12/07/2017     BNP:   Lab Results   Component Value Date BNP NOT REPORTED 06/18/2014    BNP NOT REPORTED 06/17/2014    BNP 8 07/07/2012     D-Dimer:  Lab Results   Component Value Date    DDIMER 2.49 05/04/2016     Others:   Lab Results   Component Value Date    TSH 16.83 (H) 10/21/2021    F3VMNRB 5.7 06/22/2014     Lab Results   Component Value Date    MARISA NEGATIVE 06/19/2014    SEDRATE 77 (H) 11/06/2020    CRP 42.3 (H) 11/06/2020     No results found for: Sapna Semen  Lab Results   Component Value Date    IRON 29 (L) 05/07/2016    TIBC 291 05/07/2016    FERRITIN 109 05/07/2016     No results found for: SPEP, UPEP  No results found for: PSA, CEA, , [id number],   Microbiology:    Pathology:    Radiology Reports:  CT HEAD WO CONTRAST   Final Result   No acute intracranial abnormality. MRI may be obtained if clinically   indicated. CT CHEST PULMONARY EMBOLISM W CONTRAST   Final Result   1. Suboptimal evaluation of the subsegmental and more peripheral pulmonary   arteries due to motion artifact. Given this, no obvious acute pulmonary   thromboembolic disease. Prominent main pulmonary artery suggests pulmonary   hypertension. 2.  Multifocal bilateral heterogeneous pulmonary opacities. Findings may   represent a combination of atelectasis and pulmonary edema. Multifocal   pneumonia is an additional consideration. 3.  Small bilateral pleural effusions with associated lower lobe relaxation   atelectasis. 4.  Round 2.5 cm nodule in the subcutaneous soft tissues posterior to the   left nipple demonstrates simple fluid attenuation and may represent a benign   cyst.  This could be further evaluated with focused ultrasound as warranted. CT ABDOMEN PELVIS W IV CONTRAST Additional Contrast? None   Final Result   Nonspecific presacral stranding. Otherwise no evidence of acute infectious   or inflammatory process in the abdomen or pelvis. Cirrhosis with splenomegaly. XR CHEST PORTABLE   Final Result   1. Cardiomegaly   2. Interval worsening of the interstitial alveolar opacities bilaterally,   differential considerations include worsening edema versus infection   3. Small right pleural effusion         XR CHEST (SINGLE VIEW FRONTAL)    (Results Pending)       Pulmonary Function test:    Polysomnogram:    Echocardiogram:   No results found for this or any previous visit. Cardiac Catheterization:   No results found for this or any previous visit. ASSESSMENT AND PLAN     Assessment:  Acute hypoxic respiratory failure  Metabolic encephalopathy due to Chronic CO2 retention and CO2 narcosis  Obesity hypoventilation on 45 L NC at home. Obstructive sleep apnea on CPAP at home  Never smoked, no history of COPD/asthma or any lung disease. morbid obesity  Pulmonary HTN  Multiple comorbidities: T2DM, hyperlipidemia, recent UTI, alcohol abuse, depression, hypothyroidism, bilateral lower extremity venous insufficiency, Venous stasis dermatitis, alcohol cirrhosis, hypertension, diastolic heart failure with severe LVH. Plan:    I personally interviewed/examined the patient; reviewed interval history, interpreted all available radiographic and laboratory data at the time of service. Start BiPAP due to C02 retention, needs CPAP for discharge as well. Questionable compliance of CPAP use at home  Patient on home baseline 4-5 L O2. Keep saturations 92-94%  Pending echo. Last one in 2014 with severe LVH. Currently suspecting pulmonary hypertension and evaluation for EF. Continue lasix, switch to PO. Get Echo. Continue incentive spirometry, pulmonary toilet, aspiration precautions and bronchodilators  Continue to monitor I/O with a goal of even/negative fluid balance      DVT and stress ulcer prophylaxis  Physical/occupational therapy; increase activity as tolerated.          Terri Shields MD,    Pulmonary and Critical Care Medicine           10/24/2021, 9:12 AM    Please note that this chart was generated using voice recognition Dragon dictation software. Although every effort was made to ensure the accuracy of this automated transcription, some errors in transcription may have occurred.

## 2021-10-24 NOTE — PROGRESS NOTES
PULMONARY & CRITICAL CARE MEDICINE PROGRESS NOTE     Patient:  Luz Larson  MRN: [id number]  Admit date: 10/21/2021  Primary Care Physician: Jasmeet Garcia MD  Consulting Physician: Ovi Chisholm DO    HISTORY     CHIEF COMPLAINT/REASON FOR CONSULT: SOB    HISTORY OF PRESENT ILLNESS:  The patient is a 61 y.o. male care with complaint of shortness of breath and fever. Patient transferred from [facility] after treating with antibiotics for pneumonia. Poor historian, delayed responses. However alert oriented x3. Stated he has been sick for the past few days, shortness of breath, low-grade fevers. Denies any sick contacts. Lives with his son. Uses wheelchair for ambulation, able to take care of himself. Patient received [employer] code vaccine. Not received flu shot. Denies any smoking history. Never diagnosed with any COPD. Uses 34L oxygen at home. Not using any CPAP at home. Denies chest pain, urinary symptoms, nausea, vomiting, headache. Significant history of T2DM, hyperlipidemia, recent UTI, alcohol abuse, depression, hypothyroidism, morbid obesity, FABI, bilateral lower extremity venous insufficiency, alcohol cirrhosis, hypertension, diastolic heart failure on oxygen. CT PE negative for PE, however suboptimal evaluation. Showing pulmonary hypertension, multifocal opacities concerning for atelectasis/pneumonia. CT abdomen showing liver cirrhosis with splenomegaly. Having low-grade fevers 100 point 7F, HR 70s, /80 mmHg. On 45 L NC, saturating at 96%. WBC 10 K. Normocytic anemia. UA negative for UTI. Normal renal function. VBG showing 7.4 2/58/37  Troponin 5759. TSH 16, free thyroxine 1  Echo with severe LVH in 2014. CT PE concerning for pulmonary hypertension. Morbid obesity. History of MDRO. INTERVAL HISTORY:  Restless and confused. Not febrile. Tolerating 4 L nasal cannula well with adequate saturations.   PAST MEDICAL HISTORY:        Diagnosis Date    Anxiety and depression     Back pain, chronic     BPH (benign prostatic hyperplasia)     CHF (congestive heart failure) (HCC)     Cirrhosis ([address]     Diabetes mellitus ([address]     not checking bloodsugar at home    GERD (gastroesophageal reflux disease)     H/O cardiac catheterization not sure of date    no stents    Hepatitis     Pt does not know the type.      Hiatal hernia     History of alcohol abuse     Sober since 2013    Hyperlipidemia     not taking any medication    Hypertension     IBS (irritable bowel syndrome)     Morbid obesity ([address]     Neuropathy     feet,toes    On home oxygen therapy     prn    Pancreatitis     x 5 episodes    Primary osteoarthritis of right knee     Sleep apnea     No machine    Thyroid disease     Urinary bladder neurogenic dysfunction      PAST SURGICAL HISTORY:        Procedure Laterality Date    ABDOMEN SURGERY      hernia repair x4 (ventral)    COLONOSCOPY      2012    CYSTOSCOPY  01/24/2018    CYSTOSCOPY  02/20/2019    CYSTOSCOPY N/A 2/20/2019    CYSTOSCOPY DILATION performed by Sarah Rodriguez MD at [address] [place] [address] 8/23/2019    CYSTOSCOPY/ DILATION NICOLE CATHETER EXCHANGE performed by Sarah Rodriguez MD at [address], COLON, DIAGNOSTIC     [address]  05/20/2018    repair and reduction of recurrent incarcerated incisional hernia with mesh    [place] CYSTOURETHROSCOPY N/A 1/24/2018    CYSTOSCOPY Julisa Europe performed by Sarah Rodriguez MD at [address] Bilateral 8/22/2017    FOOT DEBRIDEMENT INCISION AND DRAINAGE with bone bx performed by Radha Mueller DPM at [address] EGD TRANSORAL BIOPSY SINGLE/MULTIPLE N/A 7/19/2017    EGD BIOPSY performed by Jeevan Rodas MD at [place] [address]  07/19/2017    polypectomy    UPPER GASTROINTESTINAL ENDOSCOPY N/A 3/14/2019    EGD BIOPSY performed by Edwin Talamantes MD at [address] [facility] HERNIA REPAIR N/A 5/20/2018    REPAIR AND REDUCTION OF RECURRENT INCARCERATED INCISIONAL HERNIA WITH MESH performed by Neda Hooker MD at [facility]:       Adopted: Yes     SOCIAL HISTORY:   TOBACCO:   reports that he has never smoked. He has never used smokeless tobacco.  ETOH:  reports no history of alcohol use. DRUGS: reports no history of drug use. AVOCATION/OCCUPATIONAL EXPOSURE:    The patient denies asbestos, silica dust, coal, foundry, quarry or [employer] exposure. The patient denies  to having pet dogs, cats, turtles or exotic birds at home. There is no history of TB or TB exposure. There is no exposure to sick contacts. Travel history is not significant history of risk factors for pulmonary disease. The patient denies using [employer]. ALLERGIES:    Allergies   Allergen Reactions    No Known Allergies          HOME MEDICATIONS:  Prior to Admission medications    Medication Sig Start Date End Date Taking? Authorizing Provider   ciprofloxacin (CIPRO) 500 MG tablet Take 1 tablet by mouth every 12 hours for 10 days 10/18/21 10/28/21  Alessandro Samayoa,    insulin glargine (BASAGLAR KWIKPEN) 100 UNIT/ML injection pen Inject 76 Units into the skin 2 times daily    Historical Provider, MD   pregabalin (LYRICA) 150 MG capsule Take 150 mg by mouth 2 times daily.     Historical Provider, MD   ibuprofen (ADVIL;MOTRIN) 600 MG tablet Take 600 mg by mouth every 6 hours as needed for Pain    Historical Provider, MD   SITagliptin (JANUVIA) 100 MG tablet Take 100 mg by mouth daily    Historical Provider, MD   glipiZIDE (GLUCOTROL) 10 MG tablet Take 10 mg by mouth 2 times daily (before meals)    Historical Provider, MD   metFORMIN (GLUCOPHAGE) 500 MG tablet Take 500 mg by mouth 2 times daily (with meals)    Historical Provider, MD   nystatin (MYCOSTATIN) 906730 UNIT/GM cream Apply topically 2 times daily as needed (to skin folds)    Historical Provider, MD   clonazePAM (KLONOPIN) 1 MG tablet Take 1 tablet by mouth 2 times daily as needed for Anxiety for up to 3 days. 1/14/21 1/17/21  Josias Simeon MD   oxyCODONE-acetaminophen (PERCOCET) 7.5-325 MG per tablet Take 1 tablet by mouth every 6 hours as needed for Pain. ; Dispense 30 1/5/21   Historical Provider, MD   QUEtiapine (SEROQUEL XR) 50 MG extended release tablet Take 50 mg by mouth nightly    Historical Provider, MD   triamcinolone (KENALOG) 0.025 % cream Apply topically 2 times daily as needed    Historical Provider, MD   diphenoxylate-atropine (LOMOTIL) 2.5-0.025 MG per tablet Take 1 tablet by mouth 4 times daily as needed for Diarrhea.     Historical Provider, MD   fluocinonide (LIDEX) 0.05 % external solution Apply topically as needed (scalp itching)    Historical Provider, MD   ketoconazole (NIZORAL) 2 % shampoo Apply topically Twice a Week    Historical Provider, MD   albuterol sulfate HFA (PROAIR HFA) 108 (90 Base) MCG/ACT inhaler Inhale 2 puffs into the lungs every 6 hours as needed for Wheezing    Historical Provider, MD   tamsulosin (FLOMAX) 0.4 MG capsule Take 1 capsule by mouth daily 1/24/18   Dhaval Woo MD   oxybutynin (DITROPAN) 5 MG tablet Take 1 tablet by mouth 2 times daily 12/12/17   Mariposa Samayoa DO   bumetanide (BUMEX) 2 MG tablet Take 2 mg by mouth 2 times daily    Historical Provider, MD   aspirin 81 MG EC tablet Take 1 tablet by mouth daily 7/26/17   Aguila Alexander DO   nadolol (CORGARD) 40 MG tablet Take 40 mg by mouth daily    Historical Provider, MD   levothyroxine (SYNTHROID) 175 MCG tablet Take 175 mcg by mouth Daily     Historical Provider, MD   nystatin (MYCOSTATIN) 692294 UNIT/GM powder Apply topically 2 times daily as needed TO ABDOMINAL FOLDS, GROIN     Historical Provider, MD   esomeprazole (NEXIUM) 40 MG capsule Take 40 mg by mouth 2 times daily     Historical Provider, MD   DULoxetine (CYMBALTA) 60 MG capsule Take 60 mg by mouth every morning     Historical Provider, MD   venlafaxine normal without lesions  Neck - supple, no significant adenopathy not cooperative with JVP checking. Chest -limited exam due to body habitus. Decreased breath sounds throughout. No wheezing, no rales. Heart - normal rate, regular rhythm, normal S1, S2, no murmurs, rubs, clicks or gallops  Abdomen - soft, nontender, nondistended, no masses or organomegaly  Neurological - motor and sensory grossly normal bilaterally  Musculoskeletal - no joint tenderness, deformity or swelling  Extremities - peripheral pulses normal, pitting and nonpitting edema, dermatitis changes on lower extremity. DATA REVIEW     Medications: Current Inpatient  Scheduled Meds:   potassium bicarbonate  50 mEq Oral Daily    QUEtiapine  25 mg Oral Nightly    furosemide  40 mg IntraVENous Daily    enoxaparin  40 mg SubCUTAneous BID    clonazePAM  1 mg Oral BID    sodium chloride flush  5-40 mL IntraVENous 2 times per day    ipratropium-albuterol  1 ampule Inhalation Q4H WA    insulin lispro  0-6 Units SubCUTAneous TID WC    insulin lispro  0-3 Units SubCUTAneous Nightly    DULoxetine  60 mg Oral QAM    pregabalin  150 mg Oral BID    aspirin  81 mg Oral Daily    levothyroxine  175 mcg Oral Daily    tamsulosin  0.4 mg Oral Daily    metoprolol tartrate  25 mg Oral BID    pantoprazole  40 mg Oral QAM AC     Continuous Infusions:   sodium chloride      dextrose       INPUT/OUTPUT:  In: 8228 [P.O.:120; I.V.:888]  Out: 3650 [Urine:3650]    LABS:-  ABGs:   No results found for: PH, PCO2, PO2, HCO3, O2SAT  No results for input(s): PHART, PO2ART, UFW9TGK, EZX2FSQ, BEART, Z4BDQIIH in the last 72 hours.   Lab Results   Component Value Date    POCPH 7.398 10/22/2021    POCPCO2 66.6 (H) 10/22/2021    POCPO2 57.8 (L) 10/22/2021    POCHCO3 41.1 (HH) 10/22/2021    KFTI1JPL 88 (L) 10/22/2021     CBC:   Recent Labs     10/21/21  2213 10/23/21  0704   WBC 10.1 8.8   HGB 9.2* 9.5*   HCT 30.6* 33.4*   MCV 82.7 89.8    144   LYMPHOPCT 9*  -- RBC 3.70* 3.72*   MCH 24.9* 25.5   MCHC 30.1 28.4   RDW 15.5* 15.2*     BMP:   Recent Labs     10/21/21  2204 10/21/21  2213 10/23/21  0704   NA  --  142 144   K  --  3.1* 3.3*   CL  --  97* 96*   CO2  --  33* 36*   BUN  --  13 12   CREATININE 0.81 1.08 0.98   GLUCOSE  --  124* 119*     Liver Function Test:   Recent Labs     10/21/21  2213   PROT 7.4   LABALBU 3.1*   ALT 20   AST 25   ALKPHOS 252*   BILITOT 0.63     Amylase/Lipase:  No results for input(s): AMYLASE, LIPASE in the last 72 hours. Coagulation Profile:   No results for input(s): INR, PROTIME, APTT in the last 72 hours. Cardiac Enzymes:  No results for input(s): CKTOTAL, CKMB, CKMBINDEX, TROPONINI in the last 72 hours. Lactic Acid:  Lab Results   Component Value Date    LACTA 2.0 11/06/2020    LACTA 1.9 11/04/2020    LACTA 0.8 12/07/2017     BNP:   Lab Results   Component Value Date    BNP NOT REPORTED 06/18/2014    BNP NOT REPORTED 06/17/2014    BNP 8 07/07/2012     D-Dimer:  Lab Results   Component Value Date    DDIMER 2.49 05/04/2016     Others:   Lab Results   Component Value Date    TSH 16.83 (H) 10/21/2021    T3LGZFU 5.7 06/22/2014     Lab Results   Component Value Date    MARISA NEGATIVE 06/19/2014    SEDRATE 77 (H) 11/06/2020    CRP 42.3 (H) 11/06/2020     No results found for: Annemarie Osborne  Lab Results   Component Value Date    IRON 29 (L) 05/07/2016    TIBC 291 05/07/2016    FERRITIN 109 05/07/2016     No results found for: SPEP, UPEP  No results found for: PSA, CEA, , QW0886,   Microbiology:    Pathology:    Radiology Reports:  CT HEAD WO CONTRAST   Final Result   No acute intracranial abnormality. MRI may be obtained if clinically   indicated. CT CHEST PULMONARY EMBOLISM W CONTRAST   Final Result   1. Suboptimal evaluation of the subsegmental and more peripheral pulmonary   arteries due to motion artifact. Given this, no obvious acute pulmonary   thromboembolic disease.   Prominent main pulmonary artery suggests pulmonary   hypertension. 2.  Multifocal bilateral heterogeneous pulmonary opacities. Findings may   represent a combination of atelectasis and pulmonary edema. Multifocal   pneumonia is an additional consideration. 3.  Small bilateral pleural effusions with associated lower lobe relaxation   atelectasis. 4.  Round 2.5 cm nodule in the subcutaneous soft tissues posterior to the   left nipple demonstrates simple fluid attenuation and may represent a benign   cyst.  This could be further evaluated with focused ultrasound as warranted. CT ABDOMEN PELVIS W IV CONTRAST Additional Contrast? None   Final Result   Nonspecific presacral stranding. Otherwise no evidence of acute infectious   or inflammatory process in the abdomen or pelvis. Cirrhosis with splenomegaly. XR CHEST PORTABLE   Final Result   1. Cardiomegaly   2. Interval worsening of the interstitial alveolar opacities bilaterally,   differential considerations include worsening edema versus infection   3. Small right pleural effusion             Pulmonary Function test:    Polysomnogram:    Echocardiogram:   No results found for this or any previous visit. Cardiac Catheterization:   No results found for this or any previous visit. ASSESSMENT AND PLAN     Assessment:  Acute hypoxic respiratory failure  Suspected CO2 narcosis  Obesity hypoventilation on 45 LNC at home. Obstructive sleep apnea on CPAP at home  Never smoked, no history of COPD/asthma or any lung disease. morbid obesity  Pulmonary HTN  Multiple comorbidities: T2DM, hyperlipidemia, recent UTI, alcohol abuse, depression, hypothyroidism, bilateral lower extremity venous insufficiency, Venous stasis dermatitis, alcohol cirrhosis, hypertension, diastolic heart failure with severe LVH.     Plan:    I personally interviewed/examined the patient; reviewed interval history, interpreted all available radiographic and laboratory data at the time of service. Obtain ABGs. Follow-up on cultures. Follow-up on echo. Last one in 2014 with severe LVH. Currently suspecting pulmonary hypertension and evaluation for EF. Agree with bronchodilators. Agree with Lasix and antibiotics. Patient remains hemodynamically stable and is currently saturating well on nasal cannula 4-5 L which is his baseline. Continue supplemental oxygen to keep oxygen saturation greater than 92%  Continue incentive spirometry, pulmonary toilet, aspiration precautions and bronchodilators  Continue to monitor I/O with a goal of even/negative fluid balance      DVT and stress ulcer prophylaxis  Physical/occupational therapy; increase activity as tolerated. Elena Duke DO,    Pulmonary and Critical Care Medicine           10/23/2021, 10:42 PM    Please note that this chart was generated using voice recognition Dragon dictation software. Although every effort was made to ensure the accuracy of this automated transcription, some errors in transcription may have occurred.

## 2021-10-24 NOTE — RT PROTOCOL NOTE
YONY PRINCE, [facility]. Hypokalemia [E87.6]  Pneumonia [J18.9]  Pneumonia of both lungs due to infectious organism, unspecified part of lung [J18.9] . Vitals:    10/24/21 1600   BP: 108/86   Pulse: 77   Resp: 17   Temp: 97.7 °F (36.5 °C)   SpO2: 90%   . Patients home meds are   Prior to Admission medications    Medication Sig Start Date End Date Taking? Authorizing Provider   ciprofloxacin (CIPRO) 500 MG tablet Take 1 tablet by mouth every 12 hours for 10 days 10/18/21 10/28/21  Sara Samayoa DO   insulin glargine (BASAGLAR KWIKPEN) 100 UNIT/ML injection pen Inject 76 Units into the skin 2 times daily    Historical Provider, MD   pregabalin (LYRICA) 150 MG capsule Take 150 mg by mouth 2 times daily. Historical Provider, MD   ibuprofen (ADVIL;MOTRIN) 600 MG tablet Take 600 mg by mouth every 6 hours as needed for Pain    Historical Provider, MD   SITagliptin (JANUVIA) 100 MG tablet Take 100 mg by mouth daily    Historical Provider, MD   glipiZIDE (GLUCOTROL) 10 MG tablet Take 10 mg by mouth 2 times daily (before meals)    Historical Provider, MD   metFORMIN (GLUCOPHAGE) 500 MG tablet Take 500 mg by mouth 2 times daily (with meals)    Historical Provider, MD   nystatin (MYCOSTATIN) [id number] UNIT/GM cream Apply topically 2 times daily as needed (to skin folds)    Historical Provider, MD   clonazePAM (KLONOPIN) 1 MG tablet Take 1 tablet by mouth 2 times daily as needed for Anxiety for up to 3 days. 1/14/21 1/17/21  Ronan Hernandez MD   oxyCODONE-acetaminophen (PERCOCET) 7.5-325 MG per tablet Take 1 tablet by mouth every 6 hours as needed for Pain.  ; Dispense 30 1/5/21   Historical Provider, MD   QUEtiapine (SEROQUEL XR) 50 MG extended release tablet Take 50 mg by mouth nightly    Historical Provider, MD   triamcinolone (KENALOG) 0.025 % cream Apply topically 2 times daily as needed    Historical Provider, MD   diphenoxylate-atropine (LOMOTIL) 2.5-0.025 MG per tablet Take 1 tablet by mouth 4 times daily as needed for Diarrhea. Historical Provider, MD   fluocinonide (LIDEX) 0.05 % external solution Apply topically as needed (scalp itching)    Historical Provider, MD   ketoconazole (NIZORAL) 2 % shampoo Apply topically Twice a Week    Historical Provider, MD   albuterol sulfate HFA (PROAIR HFA) 108 (90 Base) MCG/ACT inhaler Inhale 2 puffs into the lungs every 6 hours as needed for Wheezing    Historical Provider, MD   tamsulosin (FLOMAX) 0.4 MG capsule Take 1 capsule by mouth daily 1/24/18   Sarah Rodriguez MD   oxybutynin (DITROPAN) 5 MG tablet Take 1 tablet by mouth 2 times daily 12/12/17   Sugar Samayoa,    bumetanide (BUMEX) 2 MG tablet Take 2 mg by mouth 2 times daily    Historical Provider, MD   aspirin 81 MG EC tablet Take 1 tablet by mouth daily 7/26/17   Rima Pu, DO   nadolol (CORGARD) 40 MG tablet Take 40 mg by mouth daily    Historical Provider, MD   levothyroxine (SYNTHROID) 175 MCG tablet Take 175 mcg by mouth Daily     Historical Provider, MD   nystatin (MYCOSTATIN) 541950 UNIT/GM powder Apply topically 2 times daily as needed TO ABDOMINAL FOLDS, GROIN     Historical Provider, MD   esomeprazole (NEXIUM) 40 MG capsule Take 40 mg by mouth 2 times daily     Historical Provider, MD   DULoxetine (CYMBALTA) 60 MG capsule Take 60 mg by mouth every morning     Historical Provider, MD   venlafaxine (EFFEXOR-XR) 150 MG XR capsule Take 150 mg by mouth daily. Historical Provider, MD   .    Assessment     Assessment:  Acute hypoxic respiratory failure  Suspected CO2 narcosis  Obesity hypoventilation on 45 LNC at home. Obstructive sleep apnea on CPAP at home  Never smoked, no history of COPD/asthma or any lung disease.   morbid obesity  Pulmonary HTN  Multiple comorbidities: T2DM, hyperlipidemia, recent UTI, alcohol abuse, depression, hypothyroidism, bilateral lower extremity venous insufficiency, Venous stasis dermatitis, alcohol cirrhosis, hypertension, diastolic heart failure with severe LVH.       Currently on bronchodilators. Does not take anything at home. No issue with wheezing. Will int itate Aerosol Protocol.       RR 18  Breath Sounds: clear      Bronchodilator assessment at level  1    [x]    Bronchodilator Assessment  BRONCHODILATOR ASSESSMENT SCORE  Score 0 1 2 3 4 5   Breath Sounds   []  Patient Baseline [x]  No Wheeze good aeration []  Faint, scattered wheezing, good aeration []  Expiratory Wheezing and or moderately diminished []  Insp/Exp wheeze and/or very diminished []  Insp/Exp and/ or marked distress   Respiratory Rate   []  Patient Baseline [x]  Less than 20 [x]  Less than 20 []  20-25 []  Greater than 25 []  Greater than 25   Peak flow % of Pred or PB []  NA   []  Greater than 90%  []  81-90% []  71-80% []  Less than or equal to 70%  or unable to perform []  Unable due to Respiratory Distress   Dyspnea re []  Patient Baseline [x]  No SOB [x]  No SOB []  SOB on exertion []  SOB min activity []  At rest/acute   e FEV% Predicted       [x]  NA []  Above 69%  []  Unable []  Above 60-69%  []  Unable []  Above 50-59%  []  Unable []  Above 35-49%  []  Unable []  Less than 35%  []  Unable                   YONY PRINCE RCP  4:40 PM

## 2021-10-25 LAB
ALLEN TEST: POSITIVE
ANION GAP SERPL CALCULATED.3IONS-SCNC: 19 MMOL/L (ref 9–17)
BUN BLDV-MCNC: 15 MG/DL (ref 6–20)
BUN/CREAT BLD: ABNORMAL (ref 9–20)
CALCIUM SERPL-MCNC: 9.1 MG/DL (ref 8.6–10.4)
CHLORIDE BLD-SCNC: 95 MMOL/L (ref 98–107)
CO2: 29 MMOL/L (ref 20–31)
CREAT SERPL-MCNC: 1.01 MG/DL (ref 0.7–1.2)
FIO2: 4
GFR AFRICAN AMERICAN: >60 ML/MIN
GFR NON-AFRICAN AMERICAN: >60 ML/MIN
GFR SERPL CREATININE-BSD FRML MDRD: ABNORMAL ML/MIN/{1.73_M2}
GFR SERPL CREATININE-BSD FRML MDRD: ABNORMAL ML/MIN/{1.73_M2}
GLUCOSE BLD-MCNC: 110 MG/DL (ref 75–110)
GLUCOSE BLD-MCNC: 126 MG/DL (ref 75–110)
GLUCOSE BLD-MCNC: 132 MG/DL (ref 75–110)
GLUCOSE BLD-MCNC: 138 MG/DL (ref 70–99)
GLUCOSE BLD-MCNC: 140 MG/DL (ref 75–110)
HCT VFR BLD CALC: 36.4 % (ref 40.7–50.3)
HEMOGLOBIN: 10.4 G/DL (ref 13–17)
LV EF: 50 %
LVEF MODALITY: NORMAL
MCH RBC QN AUTO: 25.3 PG (ref 25.2–33.5)
MCHC RBC AUTO-ENTMCNC: 28.6 G/DL (ref 28.4–34.8)
MCV RBC AUTO: 88.6 FL (ref 82.6–102.9)
MODE: ABNORMAL
NEGATIVE BASE EXCESS, ART: ABNORMAL (ref 0–2)
NRBC AUTOMATED: 0 PER 100 WBC
O2 DEVICE/FLOW/%: ABNORMAL
PATIENT TEMP: ABNORMAL
PDW BLD-RTO: 15.4 % (ref 11.8–14.4)
PLATELET # BLD: 242 K/UL (ref 138–453)
PMV BLD AUTO: 10.7 FL (ref 8.1–13.5)
POC HCO3: 35.2 MMOL/L (ref 21–28)
POC O2 SATURATION: 96 % (ref 94–98)
POC PCO2 TEMP: ABNORMAL MM HG
POC PCO2: 51.6 MM HG (ref 35–48)
POC PH TEMP: ABNORMAL
POC PH: 7.44 (ref 7.35–7.45)
POC PO2 TEMP: ABNORMAL MM HG
POC PO2: 78.2 MM HG (ref 83–108)
POSITIVE BASE EXCESS, ART: 10 (ref 0–3)
POTASSIUM SERPL-SCNC: 3.6 MMOL/L (ref 3.7–5.3)
RBC # BLD: 4.11 M/UL (ref 4.21–5.77)
SAMPLE SITE: ABNORMAL
SODIUM BLD-SCNC: 143 MMOL/L (ref 135–144)
TCO2 (CALC), ART: ABNORMAL MMOL/L (ref 22–29)
WBC # BLD: 12.5 K/UL (ref 3.5–11.3)

## 2021-10-25 PROCEDURE — 6360000002 HC RX W HCPCS: Performed by: INTERNAL MEDICINE

## 2021-10-25 PROCEDURE — 6370000000 HC RX 637 (ALT 250 FOR IP): Performed by: STUDENT IN AN ORGANIZED HEALTH CARE EDUCATION/TRAINING PROGRAM

## 2021-10-25 PROCEDURE — 6370000000 HC RX 637 (ALT 250 FOR IP): Performed by: NURSE PRACTITIONER

## 2021-10-25 PROCEDURE — 82803 BLOOD GASES ANY COMBINATION: CPT

## 2021-10-25 PROCEDURE — 2580000003 HC RX 258: Performed by: NURSE PRACTITIONER

## 2021-10-25 PROCEDURE — 97162 PT EVAL MOD COMPLEX 30 MIN: CPT

## 2021-10-25 PROCEDURE — 36600 WITHDRAWAL OF ARTERIAL BLOOD: CPT

## 2021-10-25 PROCEDURE — 80048 BASIC METABOLIC PNL TOTAL CA: CPT

## 2021-10-25 PROCEDURE — 94761 N-INVAS EAR/PLS OXIMETRY MLT: CPT

## 2021-10-25 PROCEDURE — 97530 THERAPEUTIC ACTIVITIES: CPT

## 2021-10-25 PROCEDURE — 99232 SBSQ HOSP IP/OBS MODERATE 35: CPT | Performed by: INTERNAL MEDICINE

## 2021-10-25 PROCEDURE — 85027 COMPLETE CBC AUTOMATED: CPT

## 2021-10-25 PROCEDURE — 6370000000 HC RX 637 (ALT 250 FOR IP): Performed by: INTERNAL MEDICINE

## 2021-10-25 PROCEDURE — 82947 ASSAY GLUCOSE BLOOD QUANT: CPT

## 2021-10-25 PROCEDURE — 2700000000 HC OXYGEN THERAPY PER DAY

## 2021-10-25 PROCEDURE — 93306 TTE W/DOPPLER COMPLETE: CPT

## 2021-10-25 PROCEDURE — 36415 COLL VENOUS BLD VENIPUNCTURE: CPT

## 2021-10-25 PROCEDURE — 2060000000 HC ICU INTERMEDIATE R&B

## 2021-10-25 RX ORDER — LORAZEPAM 0.5 MG/1
0.5 TABLET ORAL ONCE
Status: COMPLETED | OUTPATIENT
Start: 2021-10-25 | End: 2021-10-25

## 2021-10-25 RX ORDER — FUROSEMIDE 40 MG/1
40 TABLET ORAL 2 TIMES DAILY
Status: DISCONTINUED | OUTPATIENT
Start: 2021-10-25 | End: 2021-10-29 | Stop reason: HOSPADM

## 2021-10-25 RX ADMIN — PREGABALIN 150 MG: 75 CAPSULE ORAL at 20:48

## 2021-10-25 RX ADMIN — LOSARTAN POTASSIUM 50 MG: 50 TABLET, FILM COATED ORAL at 10:47

## 2021-10-25 RX ADMIN — LORAZEPAM 0.5 MG: 0.5 TABLET ORAL at 22:09

## 2021-10-25 RX ADMIN — OXYBUTYNIN CHLORIDE 5 MG: 5 TABLET ORAL at 10:47

## 2021-10-25 RX ADMIN — FUROSEMIDE 40 MG: 40 TABLET ORAL at 17:35

## 2021-10-25 RX ADMIN — CARVEDILOL 3.12 MG: 3.12 TABLET, FILM COATED ORAL at 17:35

## 2021-10-25 RX ADMIN — TAMSULOSIN HYDROCHLORIDE 0.4 MG: 0.4 CAPSULE ORAL at 10:46

## 2021-10-25 RX ADMIN — PANTOPRAZOLE SODIUM 40 MG: 40 TABLET, DELAYED RELEASE ORAL at 06:16

## 2021-10-25 RX ADMIN — INSULIN LISPRO 1 UNITS: 100 INJECTION, SOLUTION INTRAVENOUS; SUBCUTANEOUS at 20:49

## 2021-10-25 RX ADMIN — FUROSEMIDE 40 MG: 40 TABLET ORAL at 10:54

## 2021-10-25 RX ADMIN — CARVEDILOL 3.12 MG: 3.12 TABLET, FILM COATED ORAL at 10:47

## 2021-10-25 RX ADMIN — ENOXAPARIN SODIUM 40 MG: 40 INJECTION SUBCUTANEOUS at 20:49

## 2021-10-25 RX ADMIN — ENOXAPARIN SODIUM 40 MG: 40 INJECTION SUBCUTANEOUS at 10:48

## 2021-10-25 RX ADMIN — LEVOTHYROXINE SODIUM 175 MCG: 175 TABLET ORAL at 06:16

## 2021-10-25 RX ADMIN — SODIUM CHLORIDE, PRESERVATIVE FREE 10 ML: 5 INJECTION INTRAVENOUS at 10:47

## 2021-10-25 RX ADMIN — ACETAMINOPHEN 650 MG: 325 TABLET ORAL at 20:48

## 2021-10-25 RX ADMIN — OXYBUTYNIN CHLORIDE 5 MG: 5 TABLET ORAL at 20:48

## 2021-10-25 RX ADMIN — POTASSIUM BICARBONATE 50 MEQ: 977.5 TABLET, EFFERVESCENT ORAL at 10:47

## 2021-10-25 RX ADMIN — SODIUM CHLORIDE, PRESERVATIVE FREE 30 ML: 5 INJECTION INTRAVENOUS at 20:49

## 2021-10-25 RX ADMIN — DULOXETINE HYDROCHLORIDE 60 MG: 30 CAPSULE, DELAYED RELEASE ORAL at 10:47

## 2021-10-25 RX ADMIN — POTASSIUM BICARBONATE 50 MEQ: 977.5 TABLET, EFFERVESCENT ORAL at 20:48

## 2021-10-25 RX ADMIN — QUETIAPINE FUMARATE 25 MG: 25 TABLET ORAL at 20:48

## 2021-10-25 RX ADMIN — ASPIRIN 81 MG: 81 TABLET, CHEWABLE ORAL at 10:47

## 2021-10-25 RX ADMIN — PREGABALIN 150 MG: 75 CAPSULE ORAL at 10:46

## 2021-10-25 ASSESSMENT — PAIN SCALES - GENERAL
PAINLEVEL_OUTOF10: 0
PAINLEVEL_OUTOF10: 7
PAINLEVEL_OUTOF10: 0

## 2021-10-25 NOTE — PROGRESS NOTES
Physical Therapy    Facility/Department: [facility] 4B STEPDOWN  Initial Assessment    NAME: Mary Baer  : 1965  MRN: [id number]  Chief Complaint   Patient presents with    Shortness of Breath    Fever     Date of Service: 10/25/2021    Discharge Recommendations: Further therapy recommended at discharge. PT Equipment Recommendations  Equipment Needed: No (unsafe to attempt transfers/ambulation without assistance)    Assessment   Body structures, Functions, Activity limitations: Decreased functional mobility ; Decreased balance;Decreased endurance;Decreased strength;Decreased cognition;Decreased safe awareness;Decreased posture  Assessment: The pt required Mod-Max A for all bed mobility, unsafe to attempt standing due to pt's cognition. Recommend continued PT to address deficits and progress toward prior level of independence. Decision Making: Medium Complexity  PT Education: Goals;PT Role;Plan of Care; Functional Mobility Training  REQUIRES PT FOLLOW UP: Yes  Activity Tolerance  Activity Tolerance: Patient limited by endurance; Patient limited by cognitive status       Patient Diagnosis(es): The primary encounter diagnosis was Pneumonia of both lungs due to infectious organism, unspecified part of lung. A diagnosis of Hypokalemia was also pertinent to this visit. has a past medical history of Anxiety and depression, Back pain, chronic, BPH (benign prostatic hyperplasia), CHF (congestive heart failure) ([address] Cirrhosis ([address] Diabetes mellitus ([address] GERD (gastroesophageal reflux disease), H/O cardiac catheterization, Hepatitis, Hiatal hernia, History of alcohol abuse, Hyperlipidemia, Hypertension, IBS (irritable bowel syndrome), Morbid obesity ([address] Neuropathy, On home oxygen therapy, Pancreatitis, Primary osteoarthritis of right knee, Sleep apnea, Thyroid disease, and Urinary bladder neurogenic dysfunction. has a past surgical history that includes Colonoscopy;  Abdomen surgery; hernia repair; Endoscopy, colon, diagnostic; Upper gastrointestinal endoscopy (07/19/2017); pr egd transoral biopsy single/multiple (N/A, 7/19/2017); pr deep dissec foot infec,1 bursa (Bilateral, 8/22/2017); Cystoscopy (01/24/2018); pr cystourethroscopy (N/A, 1/24/2018); Incisional hernia repair (05/20/2018); ventral hernia repair (N/A, 5/20/2018); Cystocopy (02/20/2019); Cystoscopy (N/A, 2/20/2019); Upper gastrointestinal endoscopy (N/A, 3/14/2019); and Cystoscopy (N/A, 8/23/2019). Restrictions  Restrictions/Precautions  Restrictions/Precautions: Up as Tolerated, Fall Risk  Required Braces or Orthoses?: No  Vision/Hearing  Vision: Impaired  Vision Exceptions: Wears glasses at all times  Hearing: Within functional limits     Subjective  General  Patient assessed for rehabilitation services?: Yes  Response To Previous Treatment: Not applicable  Family / Caregiver Present: No  Follows Commands: Impaired (requires repetition and redirection)  Subjective  Subjective: RN and pt agreeable to PT. Pt supine in bed upon arrival, confused and cooperative throughout.  Requires redirection as pt perseverates on \"pressure on my bottom\" throughout all mobility  Pain Screening  Patient Currently in Pain: Denies  Vital Signs  Patient Currently in Pain: Denies       Orientation  Orientation  Overall Orientation Status: Impaired  Orientation Level: Oriented to person;Oriented to time;Disoriented to situation;Oriented to place  Social/Functional History  Social/Functional History  Lives With: Son  Type of Home: House  Home Layout: One level  Home Access: Ramped entrance  Bathroom Shower/Tub: Walk-in shower, Shower chair with back  Bathroom Toilet: Standard  Bathroom Equipment: Grab bars in shower, Grab bars around toilet  Home Equipment: Rolling walker, Wheelchair-manual, Oxygen, Lift chair  Receives Help From: Home health  ADL Assistance: Needs assistance (Per patient chart, receives help w/ bathing and dressing from aide)  Homemaking Assistance: Needs assistance (Per patients chart, pt's son and aide complete)  Homemaking Responsibilities: No  Ambulation Assistance: Needs assistance (Per patient's chart, uses RW at baseline)  Transfer Assistance: Needs assistance (Per patient's chart, uses lift chair)  Active : No  Patient's  Info: Per patient's chart, uses transportation services  Occupation: On disability  Type of occupation:   Leisure & Hobbies: Watching baseball  Additional Comments: Social functioning information obtained from patient's chart from PT is[place] completed on 10/17 due to pt's cognition. Cognition   Cognition  Overall Cognitive Status: Exceptions  Arousal/Alertness: Inconsistent responses to stimuli  Following Commands: Follows one step commands with increased time; Follows one step commands consistently  Attention Span: Attends with cues to redirect; Difficulty dividing attention  Memory: Decreased recall of precautions;Decreased recall of biographical Information  Safety Judgement: Decreased awareness of need for assistance;Decreased awareness of need for safety  Problem Solving: Assistance required to generate solutions;Assistance required to correct errors made  Insights: Decreased awareness of deficits  Initiation: Requires cues for some  Sequencing: Requires cues for some  Cognition Comment: Pt required frequent redirection d/t becoming off topic very easily, provided increased time to process direction. Pt repeats words used by therapist and is easily distracted. Frequently states, \"now I'm confused\".     Objective          Joint Mobility  Spine: WFL  ROM RLE: WFL- hip and knee flexion limited by body habitus  ROM LLE: WFL- hip and knee flexion limited by body habitus  ROM RUE: WFL  ROM LUE: WFL  Strength RLE  Comment: grossly 3-/5, difficult to assess due to decreased ability to follow commands  Strength LLE  Comment: grossly 3-/5, difficult to assess due to decreased ability to follow commands  Strength RUE  Comment: grossly 4-/5, difficult to assess due to decreased ability to follow commands  Strength LUE  Comment: grossly 4-/5, difficult to assess due to decreased ability to follow commands  Tone RLE  RLE Tone: Normotonic  Tone LLE  LLE Tone: Normotonic  Motor Control  Gross Motor?: WFL  Sensation  Overall Sensation Status: WFL (pt denies numbness and tingling)  Bed mobility  Rolling to Left: Maximum assistance  Supine to Sit: Moderate assistance (for trunk and BLE progression)  Sit to Supine: Maximum assistance (for BLE progression)  Scooting: Maximal assistance  Comment: HOB elevated ~30 degrees with use of bedrail, increased time to complete supine to sit. Pt attempted to return to supine 3x prior to instrcution due to complaints of buttock pain.   Transfers  Comment: unsafe to attempt due to decreased ability to follow commands  Ambulation  Ambulation?: No  Stairs/Curb  Stairs?: No     Balance  Posture: Fair  Sitting - Static: Fair  Sitting - Dynamic: Fair  Comments: Pt sat EOB ~10 minutes with CGA throughout, pt required unilateral UE support at all times to maintain balance        Plan   Plan  Times per week: 5-6x/wk  Current Treatment Recommendations: Strengthening, ROM, Balance Training, Functional Mobility Training, Transfer Training, Gait Training, Stair training, Endurance Training, Home Exercise Program, Safety Education & Training, Patient/Caregiver Education & Training  Safety Devices  Type of devices: Nurse notified, Call light within reach, Left in bed  Restraints  Initially in place: No    AM-PAC Score  AM-PAC Inpatient Mobility Raw Score : 9 (10/25/21 1205)  AM-PAC Inpatient T-Scale Score : 30.55 (10/25/21 1205)  Mobility Inpatient CMS 0-100% Score: 81.38 (10/25/21 1205)  Mobility Inpatient CMS G-Code Modifier : CM (10/25/21 1205)          Goals  Short term goals  Time Frame for Short term goals: 14 visits  Short term goal 1: Perform bed mobility with Min A  Short term goal 2: Perform sit to stand with CGA  Short term goal 3: Ambulate 100ft with appropriate AD and CGA  Short term goal 4: Demo Fair dynamic standing balance to decrease risk of falls  Short term goal 5: Participate in 30 minutes of therapy to demo increased endurance       Therapy Time   Individual Concurrent Group Co-treatment   Time In 1019         Time Out 1041         Minutes 22         Timed Code Treatment Minutes: 20 Minutes       Roman Raygoza PT

## 2021-10-25 NOTE — PROGRESS NOTES
[facility] - Progress Note    Today's Date and Time: 10/25/2021, 5:29 PM    Impression :   Chronic hypoxemic and hypercapnic respiratory failure,  DM2  Morbid Obesity with BMI 57  Essential HTN  Diastolic CHF with elevated ProBNP and Troponins  Multifocal changes and bilateral effusions by CT of chest. Most likely secondary to CHF rather than bacterial pneumonia    Recommendations:   Monitor off antibiotics   Diuresis  02 support    Medical Decision Making/Summary/Discussion:10/25/2021       Infection Control Recommendations   [place] Precautions      Antimicrobial Stewardship Recommendations     Discontinuation of therapy  Coordination of Outpatient Care:   Estimated Length of IV antimicrobials: 10-22-21  Patient will need Midline Catheter Insertion: no  Patient will need PICC line Insertion:no  Patient will need: Home IV , [place],  SNF,  LTAC: TBD  Patient will need outpatient wound care:    Chief complaint/reason for consultation:   Multifocal Pneumonia      History of Present Illness:   Jeni Pop is a 61y.o.-year-old  male who was initially admitted on 10/21/2021. Patient seen at the request of     INITIAL HISTORY:    Patient is known to our service. Previously treated by Dr Alon Bobo during his admission to [facility] on 11/4/2020 because of toxic metabolic encephalopathy and UTI with E coli and Enterococus.      Patient has known medical history of morbid obesity, pancreatitis, urinary retention status post indwelling Clemons catheter placement, CHF, diabetes mellitus type 2, essential hypertension, morbid obesity with BMI of 57. Patient had a urine culture at [facility] in August 2019 that was positive for Enterococcus, E. coli, Pseudomonas. He had a urine culture at [facility] in January 2020 with E. coli and Enterococcus.      More recently he had E. coli and Enterococcus in urine on 11-6-20.      Then Klebsiella pneumoniae on 10-14-21 and 10-15-21 in blood and urine. The patient was admitted to [facility] and treated with Cipro for the above infection. He had Clemons exchanged and was discharged to a SNF to complete treatment with Cipro. His CXR on 10-15-21 did not show any infiltrates. Current cultures of blood and urine on 10-21-21 show no growth but patient's CXR shows multifocal bilateral heterogenous opacities, bilateral posterior lobe consolidations and small pleural effusions. Findings interpreted by Radiologist as a combination of pulmonary edema and atelectasis with also the possibility of multifocal pneumonia. My own interpretation is that they are caused by fluid retention and atelectasis and not by bacterial pneumonia. WBC is normal. He is afebrile and not expectorating sputum. ProBNP and troponins are elevated. I would D/C Zosyn as it will add to fluid and Na loads. He has no Hx of MRSA. CURRENT EVALUATION : 10/25/2021    Afebrile  VS stable, HTN    Comfortable, pleasant  Clemons in place with clear urine  Appears somewhat confused, anxious  He goes from one subject to another and keeps asking the same questions. Denies SOB  On 4 L/min nasal 02  RR 17  02 sat 96      Labs, X rays reviewed: 10/25/2021    BUN: 12-->15  Cr: 0.98-->101    WBC: 8,8-->12.5  Hb:9,5-->10.4  Plat: 144-->242    ProBNP 957  CRP 42.3  Troponin HS  57-->59    SARS Co V 2:   1-14-21: negative   10-15-21: negative   10-21-21: negative     Cultures:  Urine:  10-14-21: Klebsiella  10-21-21: No growth    Blood:  10-14-21: Klebsiella  10-15-21: Klebsiella  10-21-21: No growth     Sputum :    Wound:      Discussed with patient, RNODALYS.    10-22-21:      I have personally reviewed the past medical history, past surgical history, medications, social history, and family history, and I have updated the database accordingly.   Past Medical History:     Past Medical History:   Diagnosis Date    Anxiety and depression     Back pain, chronic     BPH (benign prostatic hyperplasia)     CHF (congestive heart failure) (HCC)     [facility]     Diabetes mellitus ([facility]     not checking bloodsugar at home    GERD (gastroesophageal reflux disease)     H/O cardiac catheterization not sure of date    no stents    Hepatitis     Pt does not know the type.  Hiatal hernia     History of alcohol abuse     Sober since 2013    Hyperlipidemia     not taking any medication    Hypertension     IBS (irritable bowel syndrome)     Morbid obesity ([facility]     Neuropathy     feet,toes    On home oxygen therapy     DME for home oxgygen at 2.5 lpm at HS and as needed    Pancreatitis     x 5 episodes    Primary osteoarthritis of right knee     Sleep apnea     suspected juany, never has had PSG study.   HAS NO MACHINE    Thyroid disease     Urinary bladder neurogenic dysfunction        Past Surgical  History:     Past Surgical History:   Procedure Laterality Date    ABDOMEN SURGERY      hernia repair x4 (ventral)    COLONOSCOPY      2012    CYSTOSCOPY  01/24/2018    CYSTOSCOPY  02/20/2019    CYSTOSCOPY N/A 2/20/2019    CYSTOSCOPY DILATION performed by Clint Lund MD at [address] [place] [address] 8/23/2019    CYSTOSCOPY/ DILATION NICOLE CATHETER EXCHANGE performed by Clint Lund MD at [address], COLON, DIAGNOSTIC     [address]  05/20/2018    repair and reduction of recurrent incarcerated incisional hernia with mesh    NE CYSTOURETHROSCOPY N/A 1/24/2018    CYSTOSCOPY Emma Picket performed by Clint Lund MD at [address] Bilateral 8/22/2017    FOOT [address] with bone bx performed by Megan Velez DPM at [address] EGD TRANSORAL BIOPSY SINGLE/MULTIPLE N/A 7/19/2017    EGD BIOPSY performed by Michelle Godfrey MD at [address]  07/19/2017    polypectomy    UPPER GASTROINTESTINAL ENDOSCOPY N/A 3/14/2019    EGD BIOPSY performed by Aravind Sherman Phuc Clark MD at [facility]    VENTRAL HERNIA REPAIR N/A 2018    REPAIR AND REDUCTION OF RECURRENT INCARCERATED INCISIONAL HERNIA WITH MESH performed by Chetna Ramos MD at [address] [place] [address]       Medications:      furosemide  40 mg Oral BID    influenza virus vaccine  0.5 mL IntraMUSCular Prior to discharge    potassium bicarbonate  50 mEq Oral BID    losartan  50 mg Oral Daily    oxybutynin  5 mg Oral BID    cloNIDine  1 patch TransDERmal Weekly    carvedilol  3.125 mg Oral BID WC    QUEtiapine  25 mg Oral Nightly    enoxaparin  40 mg SubCUTAneous BID    sodium chloride flush  5-40 mL IntraVENous 2 times per day    insulin lispro  0-6 Units SubCUTAneous TID WC    insulin lispro  0-3 Units SubCUTAneous Nightly    DULoxetine  60 mg Oral QAM    pregabalin  150 mg Oral BID    aspirin  81 mg Oral Daily    levothyroxine  175 mcg Oral Daily    tamsulosin  0.4 mg Oral Daily    pantoprazole  40 mg Oral QAM AC       Social History:     Social History     Socioeconomic History    Marital status:      Spouse name: Not on file    Number of children: Not on file    Years of education: Not on file    Highest education level: Not on file   Occupational History    Not on file   Tobacco Use    Smoking status: Never Smoker    Smokeless tobacco: Never Used   Vaping Use    Vaping Use: Never used   Substance and Sexual Activity    Alcohol use: No     Comment: quit drinking     Drug use: No    Sexual activity: Not on file   Other Topics Concern    Not on file   Social History Narrative    Not on file     Social Determinants of Health     Financial Resource Strain:     Difficulty of Paying Living Expenses:    Food Insecurity:     Worried About Running Out of Food in the Last Year:     Ran Out of Food in the Last Year:    Transportation Needs:     Lack of Transportation (Medical):      Lack of Transportation (Non-Medical):    Physical Activity:     Days of Exercise per Week:     Minutes of Exercise per Session:    Stress:     Feeling of Stress :    Social Connections:     Frequency of Communication with Friends and Family:     Frequency of Social Gatherings with Friends and Family:     Attends Mandaen Services:     Active Member of Clubs or Organizations:     Attends Club or Organization Meetings:     Marital Status:    Intimate Partner Violence:     Fear of Current or Ex-Partner:     Emotionally Abused:     Physically Abused:     Sexually Abused:        Family History:     Family History   Adopted: Yes        Allergies:   No known allergies     Review of Systems:   Constitutional: No fevers or chills. SOB  Head: No headaches  Eyes: No double vision or blurry vision. No conjunctival inflammation. ENT: No sore throat or runny nose. . No hearing loss, tinnitus or vertigo. Cardiovascular: No chest pain or palpitations. Shortness of breath. GALLEGOS  Lung: Shortness of breath, cough. Minimal sputum production  Abdomen: No nausea, vomiting, diarrhea, or abdominal pain. Misael Brand No cramps. Genitourinary: No increased urinary frequency, or dysuria. No hematuria. No suprapubic or CVA pain  Musculoskeletal: No muscle aches or pains. No joint effusions, swelling or deformities  Hematologic: No bleeding or bruising. Neurologic: No headache, weakness, numbness, or tingling. Integument: No rash, no ulcers. Psychiatric: No depression. Endocrine: No polyuria, no polydipsia, no polyphagia. Physical Examination :     Patient Vitals for the past 8 hrs:   BP Temp Temp src Pulse Resp SpO2   10/25/21 1625  97.1 °F (36.2 °C) Temporal 73 23 100 %   10/25/21 1600 (!) 166/65        10/25/21 1050     18 100 %     General Appearance: Somnolent, morbidly obese  Head:  Normocephalic, no trauma  Eyes: Pupils equal, round, reactive to light and accommodation; extraocular movements intact; sclera anicteric; conjunctivae pink. No embolic phenomena.   ENT: Oropharynx clear, without erythema, exudate, or thrush. No tenderness of sinuses. Mouth/throat: mucosa pink and moist. No lesions. Dentition in good repair. Neck:Supple, without lymphadenopathy. Thyroid normal, No bruits. Pulmonary/Chest: Distant breath sounds, decreased sounds. Dullness at bases  Cardiovascular: Regular rate and rhythm without murmurs, rubs, or gallops. Abdomen: Soft, non tender. Bowel sounds normal. No organomegaly  All four Extremities: No cyanosis, clubbing, edema, or effusions. Neurologic: No gross sensory or motor deficits. Skin: Warm and dry with good turgor. Signs of peripheral arterial and venous insufficiency. Pitting and non pitting edema. Discoloration from venous stasis. Medical Decision Making -Laboratory:   I have independently reviewed/ordered the following labs:    CBC with Differential:   Recent Labs     10/24/21  0635 10/25/21  0813   WBC 8.9 12.5*   HGB 9.6* 10.4*   HCT 32.9* 36.4*    242     BMP:   Recent Labs     10/23/21  0704 10/23/21  0704 10/24/21  0635 10/25/21  0813      < > 140 143   K 3.3*   < > 3.3* 3.6*   CL 96*   < > 97* 95*   CO2 36*   < > 32* 29   BUN 12   < > 12 15   CREATININE 0.98   < > 0.90 1.01   MG 2.0  --  2.2  --     < > = values in this interval not displayed. Hepatic Function Panel:   No results for input(s): PROT, LABALBU, BILIDIR, IBILI, BILITOT, ALKPHOS, ALT, AST in the last 72 hours. No results for input(s): RPR in the last 72 hours. No results for input(s): HIV in the last 72 hours. No results for input(s): BC in the last 72 hours.   Lab Results   Component Value Date    MUCUS NOT REPORTED 10/21/2021    RBC 4.11 10/25/2021    RBC 3.93 04/02/2012    TRICHOMONAS NOT REPORTED 10/21/2021    WBC 12.5 10/25/2021    YEAST NOT REPORTED 10/21/2021    TURBIDITY Clear 10/21/2021     Lab Results   Component Value Date    CREATININE 1.01 10/25/2021    GLUCOSE 138 10/25/2021    GLUCOSE 102 08/30/2011       Medical Decision Making-Imaging:     EXAMINATION:   CTA OF THE CHEST 10/21/2021 11:37 pm       TECHNIQUE:   CTA of the chest was performed after the administration of intravenous   contrast.  Multiplanar reformatted images are provided for review.  MIP   images are provided for review. Dose modulation, iterative reconstruction,   and/or weight based adjustment of the mA/kV was utilized to reduce the   radiation dose to as low as reasonably achievable.       COMPARISON:   May 4, 2016.       HISTORY:   ORDERING SYSTEM PROVIDED HISTORY: Dyspnea, low saturation   TECHNOLOGIST PROVIDED HISTORY:   Dyspnea, low saturation   Decision Support Exception - unselect if not a suspected or confirmed   emergency medical condition->Emergency Medical Condition (MA)   Reason for Exam: Dyspnea, low saturation   Acuity: Unknown   Type of Exam: Unknown       FINDINGS:   Pulmonary Arteries: Suboptimal evaluation of the subsegmental and more   peripheral pulmonary arteries due to motion artifact.  Given this, no obvious   acute pulmonary thromboembolic disease.  Prominent main pulmonary artery   measures 3.6 cm in diameter.       Mediastinum: No evidence of mediastinal lymphadenopathy.  The heart and   pericardium demonstrate no acute abnormality.  There is no acute abnormality   of the thoracic aorta.  Aortic valve leaflet calcifications.       Lungs/pleura: Respiratory motion.  Expiratory imaging.  Multifocal bilateral   heterogeneous opacities.  Bilateral posterior lower lobe consolidations. Small bilateral pleural effusions.  No pneumothorax.  No endoluminal masslike   lesion.       Upper Abdomen: Limited images of the upper abdomen are unremarkable.       Soft Tissues/Bones: Multilevel degenerative changes in the visualized spine.    Chronic appearing mild multilevel vertebral body height loss in the thoracic   spine.  Diffusely heterogeneous marrow.  Round 2.5 cm nodule in the   subcutaneous soft tissues posterior to the left nipple demonstrates simple   fluid attenuation and may represent a benign cyst.         Impression   1.  Suboptimal evaluation of the subsegmental and more peripheral pulmonary   arteries due to motion artifact.  Given this, no obvious acute pulmonary   thromboembolic disease.  Prominent main pulmonary artery suggests pulmonary   hypertension.       2.  Multifocal bilateral heterogeneous pulmonary opacities.  Findings may   represent a combination of atelectasis and pulmonary edema.  Multifocal   pneumonia is an additional consideration.       3.  Small bilateral pleural effusions with associated lower lobe relaxation   atelectasis.       4.  Round 2.5 cm nodule in the subcutaneous soft tissues posterior to the   left nipple demonstrates simple fluid attenuation and may represent a benign   cyst.  This could be further evaluated with focused ultrasound as warranted. Medical Decision [id number]:       Medical Decision Making-Other:     Note:  Labs, medications, radiologic studies were reviewed with personal review of films  Large amounts of data were reviewed  Discussed with nursing Staff, Discharge planner  Infection Control and Prevention measures reviewed  All prior entries were reviewed  Administer medications as ordered  Prognosis: [address]  Discharge planning reviewed  Follow up as outpatient. Thank you for allowing us to participate in the care of this patient. Please call with questions.     Keely Feliciano MD  Pager: [phone] - Office: [phone]

## 2021-10-25 NOTE — PROGRESS NOTES
PULMONARY & CRITICAL CARE MEDICINE PROGRESS NOTE     Patient:  Angela Padilla  MRN: [id number]  Admit date: 10/21/2021  Primary Care Physician: James Farley MD  Consulting Physician: Porfirio Ramírez DO    HISTORY     CHIEF COMPLAINT/REASON FOR CONSULT: SOB    HISTORY OF PRESENT ILLNESS:  The patient is a 61 y.o. male care with complaint of shortness of breath and fever. Patient transferred from [facility] after treating with antibiotics for pneumonia. Poor historian, delayed responses. However alert oriented x3. Stated he has been sick for the past few days, shortness of breath, low-grade fevers. Denies any sick contacts. Lives with his son. Uses wheelchair for ambulation, able to take care of himself. Patient received Peter Fucristinae code vaccine. Not received flu shot. Denies any smoking history. Never diagnosed with any COPD. Uses 34L oxygen at home. Not using any CPAP at home. Denies chest pain, urinary symptoms, nausea, vomiting, headache. Significant history of T2DM, hyperlipidemia, recent UTI, alcohol abuse, depression, hypothyroidism, morbid obesity, FABI, bilateral lower extremity venous insufficiency, alcohol cirrhosis, hypertension, diastolic heart failure on oxygen. CT PE negative for PE, however suboptimal evaluation. Showing pulmonary hypertension, multifocal opacities concerning for atelectasis/pneumonia. CT abdomen showing liver cirrhosis with splenomegaly. Echo with severe LVH in 2014. CT PE concerning for pulmonary hypertension. Morbid obesity. History of MDRO.     INTERVAL HISTORY:  Hemodynamically stable, but still confused, lethargy better from yesterday  Refused Psych evaluation  No fevers, mild hypertensive  Used Bipap yesterday for 4-5 hrs  WBC elevated to 12K, no steroid exposure, no signs of sepsis  Echo reviewed    PAST MEDICAL HISTORY:        Diagnosis Date    Anxiety and depression     Back pain, chronic     BPH (benign prostatic hyperplasia)     CHF VENTRAL HERNIA REPAIR N/A 5/20/2018    REPAIR AND REDUCTION OF RECURRENT INCARCERATED INCISIONAL HERNIA WITH MESH performed by Anuja Esquivel MD at [address]:       Adopted: Yes     SOCIAL HISTORY:   TOBACCO:   reports that he has never smoked. He has never used smokeless tobacco.  ETOH:  reports no history of alcohol use. DRUGS: reports no history of drug use. AVOCATION/OCCUPATIONAL EXPOSURE:    The patient denies asbestos, silica dust, coal, foundry, quarry or [employer] exposure. The patient denies  to having pet dogs, cats, turtles or exotic birds at home. There is no history of TB or TB exposure. There is no exposure to sick contacts. Travel history is not significant history of risk factors for pulmonary disease. The patient denies using [employer] T[employer]. ALLERGIES:    Allergies   Allergen Reactions    No Known Allergies          HOME MEDICATIONS:  Prior to Admission medications    Medication Sig Start Date End Date Taking? Authorizing Provider   ciprofloxacin (CIPRO) 500 MG tablet Take 1 tablet by mouth every 12 hours for 10 days 10/18/21 10/28/21  Shannen Samayoa DO   insulin glargine (BASAGLAR KWIKPEN) 100 UNIT/ML injection pen Inject 76 Units into the skin 2 times daily    Historical Provider, MD   pregabalin (LYRICA) 150 MG capsule Take 150 mg by mouth 2 times daily.     Historical Provider, MD   ibuprofen (ADVIL;MOTRIN) 600 MG tablet Take 600 mg by mouth every 6 hours as needed for Pain    Historical Provider, MD   SITagliptin (JANUVIA) 100 MG tablet Take 100 mg by mouth daily    Historical Provider, MD   glipiZIDE (GLUCOTROL) 10 MG tablet Take 10 mg by mouth 2 times daily (before meals)    Historical Provider, MD   metFORMIN (GLUCOPHAGE) 500 MG tablet Take 500 mg by mouth 2 times daily (with meals)    Historical Provider, MD   nystatin (MYCOSTATIN) 773188 UNIT/GM cream Apply topically 2 times daily as needed (to skin folds)    Historical Provider, MD   clonazePAM (KLONOPIN) 1 MG tablet Take 1 tablet by mouth 2 times daily as needed for Anxiety for up to 3 days. 1/14/21 1/17/21  Linda Monroe MD   oxyCODONE-acetaminophen (PERCOCET) 7.5-325 MG per tablet Take 1 tablet by mouth every 6 hours as needed for Pain. ; Dispense 30 1/5/21   Historical Provider, MD   QUEtiapine (SEROQUEL XR) 50 MG extended release tablet Take 50 mg by mouth nightly    Historical Provider, MD   triamcinolone (KENALOG) 0.025 % cream Apply topically 2 times daily as needed    Historical Provider, MD   diphenoxylate-atropine (LOMOTIL) 2.5-0.025 MG per tablet Take 1 tablet by mouth 4 times daily as needed for Diarrhea.     Historical Provider, MD   fluocinonide (LIDEX) 0.05 % external solution Apply topically as needed (scalp itching)    Historical Provider, MD   ketoconazole (NIZORAL) 2 % shampoo Apply topically Twice a Week    Historical Provider, MD   albuterol sulfate HFA (PROAIR HFA) 108 (90 Base) MCG/ACT inhaler Inhale 2 puffs into the lungs every 6 hours as needed for Wheezing    Historical Provider, MD   tamsulosin (FLOMAX) 0.4 MG capsule Take 1 capsule by mouth daily 1/24/18   Ximena Shultz MD   oxybutynin (DITROPAN) 5 MG tablet Take 1 tablet by mouth 2 times daily 12/12/17   Janay Samayoa,    bumetanide (BUMEX) 2 MG tablet Take 2 mg by mouth 2 times daily    Historical Provider, MD   aspirin 81 MG EC tablet Take 1 tablet by mouth daily 7/26/17   Jose Kennedy DO   nadolol (CORGARD) 40 MG tablet Take 40 mg by mouth daily    Historical Provider, MD   levothyroxine (SYNTHROID) 175 MCG tablet Take 175 mcg by mouth Daily     Historical Provider, MD   nystatin (MYCOSTATIN) 635975 UNIT/GM powder Apply topically 2 times daily as needed TO ABDOMINAL FOLDS, GROIN     Historical Provider, MD   esomeprazole (NEXIUM) 40 MG capsule Take 40 mg by mouth 2 times daily     Historical Provider, MD   DULoxetine (CYMBALTA) 60 MG capsule Take 60 mg by mouth every morning     Historical Provider, MD   venlafaxine (EFFEXOR-XR) 150 MG XR capsule Take 150 mg by mouth daily. Historical Provider, MD     IMMUNIZATIONS:    There is no immunization history on file for this patient. REVIEW OF SYSTEMS:  Unable to obtain due to drowsiness    PHYSICAL EXAMINATION     VITAL SIGNS:   LAST-  BP (!) 151/67   Pulse 95   Temp 98.4 °F (36.9 °C)   Resp 18   Ht 5' 10\" (1.778 m)   Wt (!) 399 lb 14.6 oz (181.4 kg)   SpO2 99%   BMI 57.38 kg/m²   8-24 HR RANGE-  TEMP Temp  Av.1 °F (36.7 °C)  Min: 97.5 °F (36.4 °C)  Max: 98.7 °F (44.4 °C)   BP Systolic (57JIR), KKF:746 , Min:108 , EPQ:134      Diastolic (66ZTF), GKT:35, Min:60, Max:87     PULSE Pulse  Av.7  Min: 56  Max: 95   RR Resp  Av.5  Min: 18  Max: 26   O2 SAT SpO2  Av.8 %  Min: 99 %  Max: 100 %   OXYGEN DELIVERY No data recorded     SYSTEMIC EXAMINATION:   General appearance -lethargy improved, morbidly obese   Mental status oriented x2, restless leg movements intermittently  Eyes - pupils equal and reactive, extraocular eye movements intact, sclera anicteric  Ears - not examined  Nose - normal and patent, no erythema, discharge  Mouth - mucous membranes moist, pharynx normal without lesions  Neck - supple, no significant adenopathy not cooperative with JVP checking. Chest -limited exam due to body habitus. Decreased breath sounds throughout. No wheezing, no rales. Heart - normal rate, regular rhythm, normal S1, S2, no murmurs, rubs, clicks or gallops  Abdomen - soft, nontender, nondistended, no masses or organomegaly  Neurological - motor and sensory grossly normal bilaterally  Musculoskeletal - no joint tenderness, deformity or swelling  Extremities - peripheral pulses normal, pitting and nonpitting edema, dermatitis changes on lower extremity.       DATA REVIEW     Medications: Current Inpatient  Scheduled Meds:   furosemide  40 mg Oral BID    potassium bicarbonate  50 mEq Oral BID    losartan  50 mg Oral Daily    oxybutynin  5 mg Oral BID    cloNIDine  1 patch TransDERmal Weekly    carvedilol  3.125 mg Oral BID     QUEtiapine  25 mg Oral Nightly    enoxaparin  40 mg SubCUTAneous BID    sodium chloride flush  5-40 mL IntraVENous 2 times per day    insulin lispro  0-6 Units SubCUTAneous TID WC    insulin lispro  0-3 Units SubCUTAneous Nightly    DULoxetine  60 mg Oral QAM    pregabalin  150 mg Oral BID    aspirin  81 mg Oral Daily    levothyroxine  175 mcg Oral Daily    tamsulosin  0.4 mg Oral Daily    pantoprazole  40 mg Oral QAM AC     Continuous Infusions:   sodium chloride      dextrose       INPUT/OUTPUT:  In: -   Out: 5912 [Urine:2875]    LABS:-  ABGs:   No results found for: PH, PCO2, PO2, HCO3, O2SAT  No results for input(s): PHART, PO2ART, EDJ2MTF, IOB6XWQ, BEART, U3HPFANW in the last 72 hours. Lab Results   Component Value Date    POCPH 7.453 (H) 10/24/2021    POCPCO2 48.6 (H) 10/24/2021    POCPO2 64.1 (L) 10/24/2021    POCHCO3 34.0 (H) 10/24/2021    VACB2ODZ 93 (L) 10/24/2021     CBC:   Recent Labs     10/23/21  0704 10/24/21  0635 10/25/21  0813   WBC 8.8 8.9 12.5*   HGB 9.5* 9.6* 10.4*   HCT 33.4* 32.9* 36.4*   MCV 89.8 85.9 88.6    155 242   RBC 3.72* 3.83* 4.11*   MCH 25.5 25.1* 25.3   MCHC 28.4 29.2 28.6   RDW 15.2* 15.1* 15.4*     BMP:   Recent Labs     10/23/21  0704 10/24/21  0635 10/25/21  0813    140 143   K 3.3* 3.3* 3.6*   CL 96* 97* 95*   CO2 36* 32* 29   BUN 12 12 15   CREATININE 0.98 0.90 1.01   GLUCOSE 119* 122* 138*     Liver Function Test:   No results for input(s): PROT, LABALBU, ALT, AST, GGT, ALKPHOS, BILITOT in the last 72 hours. Amylase/Lipase:  No results for input(s): AMYLASE, LIPASE in the last 72 hours. Coagulation Profile:   No results for input(s): INR, PROTIME, APTT in the last 72 hours. Cardiac Enzymes:  No results for input(s): CKTOTAL, CKMB, CKMBINDEX, TROPONINI in the last 72 hours.   Lactic Acid:  Lab Results   Component Value Date    LACTA 2.0 11/06/2020    LACTA 1.9 11/04/2020 LACTA 0.8 12/07/2017     BNP:   Lab Results   Component Value Date    BNP NOT REPORTED 06/18/2014    BNP NOT REPORTED 06/17/2014    BNP 8 07/07/2012     D-Dimer:  Lab Results   Component Value Date    DDIMER 2.49 05/04/2016     Others:   Lab Results   Component Value Date    TSH 16.83 (H) 10/21/2021    F8FYHPC 5.7 06/22/2014     Lab Results   Component Value Date    MARISA NEGATIVE 06/19/2014    SEDRATE 77 (H) 11/06/2020    CRP 42.3 (H) 11/06/2020     No results found for: Marija Lucia  Lab Results   Component Value Date    IRON 29 (L) 05/07/2016    TIBC 291 05/07/2016    FERRITIN 109 05/07/2016     No results found for: SPEP, UPEP  No results found for: PSA, CEA, , VN4210,   Microbiology:    Pathology:    Radiology Reports:  XR CHEST (SINGLE VIEW FRONTAL)   Final Result   Stable cardiomegaly with low lung volumes and small bilateral pleural   effusions. Patchy bilateral airspace disease is not significantly changed   from prior. CT HEAD WO CONTRAST   Final Result   No acute intracranial abnormality. MRI may be obtained if clinically   indicated. CT CHEST PULMONARY EMBOLISM W CONTRAST   Final Result   1. Suboptimal evaluation of the subsegmental and more peripheral pulmonary   arteries due to motion artifact. Given this, no obvious acute pulmonary   thromboembolic disease. Prominent main pulmonary artery suggests pulmonary   hypertension. 2.  Multifocal bilateral heterogeneous pulmonary opacities. Findings may   represent a combination of atelectasis and pulmonary edema. Multifocal   pneumonia is an additional consideration. 3.  Small bilateral pleural effusions with associated lower lobe relaxation   atelectasis. 4.  Round 2.5 cm nodule in the subcutaneous soft tissues posterior to the   left nipple demonstrates simple fluid attenuation and may represent a benign   cyst.  This could be further evaluated with focused ultrasound as warranted.          CT ABDOMEN PELVIS W IV CONTRAST Additional Contrast? None   Final Result   Nonspecific presacral stranding. Otherwise no evidence of acute infectious   or inflammatory process in the abdomen or pelvis. Cirrhosis with splenomegaly. XR CHEST PORTABLE   Final Result   1. Cardiomegaly   2. Interval worsening of the interstitial alveolar opacities bilaterally,   differential considerations include worsening edema versus infection   3. Small right pleural effusion             Pulmonary Function test:    Polysomnogram:    Echocardiogram:   No results found for this or any previous visit. Cardiac Catheterization:   No results found for this or any previous visit. ASSESSMENT AND PLAN     Assessment:  Acute hypoxic respiratory failure  Metabolic encephalopathy due to Chronic CO2 retention and CO2 narcosis  Obesity hypoventilation on 45 L NC at home. Obstructive sleep apnea on CPAP at home  Never smoked, no history of COPD/asthma or any lung disease. morbid obesity  Pulmonary HTN  Multiple comorbidities: T2DM, hyperlipidemia, recent UTI, alcohol abuse, depression, hypothyroidism, bilateral lower extremity venous insufficiency, Venous stasis dermatitis, alcohol cirrhosis, hypertension, diastolic heart failure. Plan:    I personally interviewed/examined the patient; reviewed interval history, interpreted all available radiographic and laboratory data at the time of service. Continue BiPAP even day time as tolerated, needs CPAP/BiPAP for discharge as well. Patient on home baseline 4-5 L O2. Keep saturations 92-94%  Echo with preserved EF, no pulmonary HTN. Continue incentive spirometry, pulmonary toilet, aspiration precautions and bronchodilators  Continue to monitor I/O with a goal of even/negative fluid balance      DVT and stress ulcer prophylaxis  Physical/occupational therapy; increase activity as tolerated.          Waldo Romero MD,    Pulmonary and Critical Care Medicine           10/25/2021, 9:18 AM   Attending Physician Statement  I have discussed the care of Barbara Conteh, including pertinent history and exam findings,  with the resident. I have seen and examined the patient and the key elements of all parts of the encounter have been performed by me. I agree with the assessment, plan and orders as documented by the resident with additions . Patient seen on 10/25 but note written on 10/26/21    Treatment plan Discussed with nursing staff in detail , all questions answered . Electronically signed by Del Jones MD on   10/26/21 at 12:06 PM EDT    Please note that this chart was generated using voice recognition Dragon dictation software. Although every effort was made to ensure the accuracy of this automated transcription, some errors in transcription may have occurred. Please note that this chart was generated using voice recognition Dragon dictation software. Although every effort was made to ensure the accuracy of this automated transcription, some errors in transcription may have occurred.

## 2021-10-25 NOTE — FLOWSHEET NOTE
10/25/21 1931   Encounter Summary   Services provided to: Patient   Referral/Consult From: Other disciplines   Support System Family members   Continue Visiting   (10/25/2021)   Complexity of Encounter Low   Length of Encounter 15 minutes   Spiritual Assessment Completed Yes   Routine   Type Follow up   Assessment Calm; Hopeful   Intervention Active listening;Nurtured hope   Outcome Expressed gratitude

## 2021-10-25 NOTE — PROGRESS NOTES
[facility]  Office: [address], DO, [facility], DO, Renato Evans, DO, [facility], DO, Patricia Smiley MD, Paul Corral MD, Mihai Washington MD, Deyvi Stone MD, Kaity Joe MD, Jonnie Boone MD, Ce Maradiaga MD, Lazarus Moats, MD, Hung Rivera, DO, Aisha Corrigan, DO, Peggy Barth MD,  Josemanuel Story, DO, Yenifer Aguilar MD, Sonia Weber MD, López Mendoza MD, Mari Matias MD, Kiran Roberto MD, Priscilla Law MD, Jeison Petersen, [facility], [facility], CNP, Shefali Case, CNP, Claudine Morris, CNS, Jhony Kelley, [facility], Manjinder Nunez, Syede Sickle, CNP, Gretchen Cave, CNP, Marylu Ports, CNP, Todd Alba, CNP, Lashonda Curtis PA-C, Lavonne Hawthorne, [facility], General Olp, CNP, Francoise Ramon, CNP, Hector Ryan, CNP, Ganesh Lopez, CNP, Sandee Philippe, CNP, Jessica Graff, CNP, Yeni Deluna, [address] [place] [address]    Progress Note    10/25/2021    8:28 AM    Name:   Madie Nathan  MRN:     [id number]     Acct:      [de-identified]   Room:   [address] [place] [address] Day:  3  Admit Date:  10/21/2021  9:44 PM    PCP:   Mahendra Pichardo MD  Code Status:  Full Code    Subjective:     C/C:   Chief Complaint   Patient presents with    Shortness of Breath    Fever     Interval History Status: improved. Patient's mentation was greatly improved today from last 2 days. No longer seeing visual or auditory hallucinations. Catapres was started yesterday for withdrawals, blood pressure still labile and possibly unreliable due to patient's body habitus. Patient was started on BiPAP yesterday for hypercapnia. Patient is oxygen requirements back at baseline this morning and patient had no complaints    Brief History:     From H&P  Madie Nathan is a 61 y.o. Non- / non  male who presents with Shortness of Breath and Fever   and is admitted to the hospital for the management of Pneumonia.   48-year-old male past medical history of urinary tract infection, type 2 diabetes, hyperlipidemia, prior alcohol abuse, depression, hypothyroidism, morbid obesity, bipolar disorder, anxiety, obstructive sleep apnea, venous insufficiency of bilateral lower extremities, history of alcoholic cirrhosis, history of portal hypertension, chronic dependence of supplemental oxygen, diastolic heart failure presents with shortness of breath with concern for pneumonia seen on chest x-ray at outlying facility. Patient was transported to [address] [place] [address] for further care. Of note patient was recently discharged from another [facility] due to urinary tract infection and was discharged on cipro. Review of Systems:     Constitutional:  negative for chills, fevers, sweats  Respiratory:  negative for cough, dyspnea on exertion, shortness of breath, wheezing  Cardiovascular:  negative for chest pain, chest pressure/discomfort, lower extremity edema, palpitations  Gastrointestinal:  negative for abdominal pain, constipation, diarrhea, nausea, vomiting  Neurological:  negative for dizziness, headache    Medications: Allergies:     Allergies   Allergen Reactions    No Known Allergies        Current Meds:   Scheduled Meds:    furosemide  40 mg Oral BID    potassium bicarbonate  50 mEq Oral BID    losartan  50 mg Oral Daily    oxybutynin  5 mg Oral BID    cloNIDine  1 patch TransDERmal Weekly    carvedilol  3.125 mg Oral BID WC    QUEtiapine  25 mg Oral Nightly    enoxaparin  40 mg SubCUTAneous BID    sodium chloride flush  5-40 mL IntraVENous 2 times per day    insulin lispro  0-6 Units SubCUTAneous TID WC    insulin lispro  0-3 Units SubCUTAneous Nightly    DULoxetine  60 mg Oral QAM    pregabalin  150 mg Oral BID    aspirin  81 mg Oral Daily    levothyroxine  175 mcg Oral Daily    tamsulosin  0.4 mg Oral Daily    pantoprazole  40 mg Oral QAM AC     Continuous Infusions:    sodium chloride      dextrose       PRN Meds: hydrALAZINE, albuterol, sodium chloride flush, sodium chloride, ondansetron **OR** ondansetron, magnesium hydroxide, acetaminophen **OR** acetaminophen, glucose, dextrose, glucagon (rDNA), dextrose, albuterol sulfate HFA    Data:     Past Medical History:   has a past medical history of Anxiety and depression, Back pain, chronic, BPH (benign prostatic hyperplasia), [facility]est[facility]) [facility] Cirrhosis ([facility] Diabetes mellitus ([facility] GERD (gastroesophageal reflux disease), H/O cardiac catheterization, Hepatitis, Hiatal hernia, History of alcohol abuse, Hyperlipidemia, Hypertension, IBS (irritable bowel syndrome), Morbid obesity ([facility] Neuropathy, On home oxygen therapy, Pancreatitis, Primary osteoarthritis of right knee, Sleep apnea, Thyroid disease, and Urinary bladder neurogenic dysfunction. Social History:   reports that he has never smoked. He has never used smokeless tobacco. He reports that he does not drink alcohol and does not use drugs. Family History:   Family History   Adopted: Yes       Vitals:  BP (!) 151/67   Pulse 95   Temp 98.4 °F (36.9 °C)   Resp 18   Ht 5' 10\" (1.778 m)   Wt (!) 399 lb 14.6 oz (181.4 kg)   SpO2 99%   BMI 57.38 kg/m²   Temp (24hrs), Av °F (36.7 °C), Min:97.3 °F (36.3 °C), Max:98.7 °F (37.1 °C)    Recent Labs     10/24/21  1202 10/24/21  1636 10/24/21  1959 10/25/21  0751   POCGLU 119* 120* 103 110       I/O (24Hr):     Intake/Output Summary (Last 24 hours) at 10/25/2021 0828  Last data filed at 10/25/2021 0438  Gross per 24 hour   Intake 645.47 ml   Output 4025 ml   Net -3379.53 ml       Labs:  Hematology:  Recent Labs     10/23/21  0704 10/24/21  0635 10/25/21  0813   WBC 8.8 8.9 12.5*   RBC 3.72* 3.83* 4.11*   HGB 9.5* 9.6* 10.4*   HCT 33.4* 32.9* 36.4*   MCV 89.8 85.9 88.6   MCH 25.5 25.1* 25.3   MCHC 28.4 29.2 28.6   RDW 15.2* 15.1* 15.4*    155 242   MPV 10.4 10.5 10.7     Chemistry:  Recent Labs     10/23/21  0704 10/24/21  0635    140   K 3.3* 3.3* CL 96* 97*   CO2 36* 32*   GLUCOSE 119* 122*   BUN 12 12   CREATININE 0.98 0.90   MG 2.0 2.2   ANIONGAP 12 11   LABGLOM >60 >60   GFRAA >60 >60   CALCIUM 8.4* 8.4*   PROBNP 957*  --      Recent Labs     10/23/21  1640 10/23/21  2034 10/24/21  1202 10/24/21  1636 10/24/21  1959 10/25/21  0751   POCGLU 118* 111* 119* 120* 103 110     ABG:  Lab Results   Component Value Date    POCPH 7.453 10/24/2021    PHART 7.330 06/18/2014    POCPCO2 48.6 10/24/2021    KCI5KNE 68.0 06/18/2014    POCPO2 64.1 10/24/2021    PO2ART 84.0 06/18/2014    POCHCO3 34.0 10/24/2021    CGB9SCX 34.9 06/18/2014    NBEA NOT REPORTED 10/24/2021    PBEA 9 10/24/2021    JEX8YFC NOT REPORTED 10/24/2021    MLOC3LTO 93 10/24/2021    W3KQLQPP 96.5 06/18/2014    FIO2 30.0 10/24/2021     Lab Results   Component Value Date/Time    SPECIAL NOT REPORTED 10/21/2021 10:37 PM     Lab Results   Component Value Date/Time    CULTURE NO GROWTH 10/21/2021 10:37 PM       Radiology:  CT HEAD WO CONTRAST    Result Date: 10/22/2021  No acute intracranial abnormality. MRI may be obtained if clinically indicated. CT ABDOMEN PELVIS W IV CONTRAST Additional Contrast? None    Result Date: 10/22/2021  Nonspecific presacral stranding. Otherwise no evidence of acute infectious or inflammatory process in the abdomen or pelvis. Cirrhosis with splenomegaly. XR CHEST PORTABLE    Result Date: 10/21/2021  1. Cardiomegaly 2. Interval worsening of the interstitial alveolar opacities bilaterally, differential considerations include worsening edema versus infection 3. Small right pleural effusion     CT CHEST PULMONARY EMBOLISM W CONTRAST    Result Date: 10/22/2021  1. Suboptimal evaluation of the subsegmental and more peripheral pulmonary arteries due to motion artifact. Given this, no obvious acute pulmonary thromboembolic disease. Prominent main pulmonary artery suggests pulmonary hypertension. 2.  Multifocal bilateral heterogeneous pulmonary opacities.   Findings may represent a combination of atelectasis and pulmonary edema. Multifocal pneumonia is an additional consideration. 3.  Small bilateral pleural effusions with associated lower lobe relaxation atelectasis. 4.  Round 2.5 cm nodule in the subcutaneous soft tissues posterior to the left nipple demonstrates simple fluid attenuation and may represent a benign cyst.  This could be further evaluated with focused ultrasound as warranted.        Physical Examination:        General appearance:  alert, cooperative and no distress  Mental Status:  oriented to person, place and time and normal affect  Lungs:  clear to auscultation bilaterally, normal effort  Heart:  regular rate and rhythm, no murmur  Abdomen:  soft, nontender, nondistended, normal bowel sounds, no masses, hepatomegaly, splenomegaly  Extremities:  no edema, redness, tenderness in the calves  Skin:  no gross lesions, rashes, induration    Assessment:        Hospital Problems         Last Modified POA    * (Principal) Acute on chronic diastolic congestive heart failure ([address] 10/24/2021 Yes    Overview Signed 10/14/2017  7:34 PM by [facility]     Updating Deprecated Diagnoses         Alcoholic cirrhosis of liver ([address] sober since 2013 10/22/2021 Yes    Bipolar disorder ([address] 10/22/2021 Yes    Type 2 diabetes mellitus with diabetic neuropathy, with long-term current use of insulin ([address] 10/22/2021 Yes    FABI (obstructive sleep apnea) 10/22/2021 Yes    Primary osteoarthritis of right knee (Chronic) 10/22/2021 Yes    Type 2 diabetes mellitus with diabetic neuropathy ([address] (Chronic) 10/22/2021 Yes    Acquired hypothyroidism 10/22/2021 Yes    Urine retention 10/22/2021 Yes    Portal hypertension ([address] (Chronic) 10/22/2021 Yes    Venous stasis dermatitis of both lower extremities (Chronic) 10/22/2021 Yes    Chronic indwelling Clemons catheter (Chronic) 10/22/2021 Yes    Anxiety and depression 10/22/2021 Yes    BPH (benign prostatic hyperplasia) 10/22/2021 Yes    Morbid obesity ([address] 10/22/2021 Yes    On home oxygen therapy 10/22/2021 Yes    Overview Signed 11/4/2020  2:08 PM by SUNSHINE Sahu NP     prn         Pneumonia 10/24/2021 Yes          Plan:        Acute respiratory failure with hypoxia -likely pulmonary edema, Lasix 40 mg IV last few days, diuresed approximately 10 L since admission. Transition to 40 mg oral as patient is back at baseline oxygen. Hallucinationspossibly withdrawal from benzodiazepines, hallucinations have resolved, Catapres 0.1 mg patch was ordered. May be able to discontinue tomorrow. Essential hypertensionpatient is on Catapres patch, Coreg, Cozaar. No changes to blood pressure at this time as blood pressure measurements seem to be unreliable. Patient has had recorded blood pressures of systolic 695. Hypothyroidismcontinue Synthroid  Urinary retention f/u outpatient, chronic urinary retention, continue Flomax did prevent. Urology follow-up  Type 2 diabetescontinue sliding scale and POC glucose  Super morbid obesity  FABI  Bipolar disorder  Disposition: Patient is at baseline oxygenation, still diuresing well. Replace potassium, continue Catapres, possible discharge next 24 to 48 hours pending SNF placement. Change lasix to oral meds.      Mitzi Kelly DO  10/25/2021  8:28 AM

## 2021-10-25 NOTE — CARE COORDINATION
Patient/family seen: Yes       Informed patient/family of BPCI-A Medical Bundle Program with potential outreach by either Care Transitions Team or naviHealth Team based on hospital admission and location.        BPCI-A Notification Letter given: Yes         Current discharge plan: MAC  SNF

## 2021-10-25 NOTE — CARE COORDINATION
10/25/21  nHpredict tool uploaded to chart and can be viewed in the media tab, recommending SNF for greatest gains.  GUSTAVO LUCAS      1965    PLOF: Lives at home w son, ramped entrance, has RW, w/c, lift chair, home O2, Aides 2 x day, 7 days a week. Assisted w ambulation/RW, ADls and IADLs. CLOF: MAX A x 2 for bed mobility, MIN For grooming, MOD A for UB ADLs, MAX A for LB ADLS and toileting, A&O, inconsistent w 1 step directions, requires increased time, cues and reminders, glasses.  nH Predict recommends: SNF @ 15 days    DC Plan: Per CC note, plan is return to SNF [facility]            Tammy Byrnes MSN, [id number] Coordinator     Cell: [phone]

## 2021-10-26 LAB
ALBUMIN SERPL-MCNC: 3.2 G/DL (ref 3.5–5.2)
ANION GAP SERPL CALCULATED.3IONS-SCNC: 16 MMOL/L (ref 9–17)
BUN BLDV-MCNC: 14 MG/DL (ref 6–20)
BUN/CREAT BLD: ABNORMAL (ref 9–20)
CALCIUM SERPL-MCNC: 8.5 MG/DL (ref 8.6–10.4)
CHLORIDE BLD-SCNC: 93 MMOL/L (ref 98–107)
CO2: 28 MMOL/L (ref 20–31)
CREAT SERPL-MCNC: 0.96 MG/DL (ref 0.7–1.2)
GFR AFRICAN AMERICAN: >60 ML/MIN
GFR NON-AFRICAN AMERICAN: >60 ML/MIN
GFR SERPL CREATININE-BSD FRML MDRD: ABNORMAL ML/MIN/{1.73_M2}
GFR SERPL CREATININE-BSD FRML MDRD: ABNORMAL ML/MIN/{1.73_M2}
GLUCOSE BLD-MCNC: 126 MG/DL (ref 70–99)
GLUCOSE BLD-MCNC: 128 MG/DL (ref 75–110)
GLUCOSE BLD-MCNC: 132 MG/DL (ref 75–110)
GLUCOSE BLD-MCNC: 135 MG/DL (ref 75–110)
GLUCOSE BLD-MCNC: 152 MG/DL (ref 75–110)
HCT VFR BLD CALC: 34.8 % (ref 40.7–50.3)
HEMOGLOBIN: 9.9 G/DL (ref 13–17)
MAGNESIUM: 2 MG/DL (ref 1.6–2.6)
MCH RBC QN AUTO: 24.7 PG (ref 25.2–33.5)
MCHC RBC AUTO-ENTMCNC: 28.4 G/DL (ref 28.4–34.8)
MCV RBC AUTO: 86.8 FL (ref 82.6–102.9)
NRBC AUTOMATED: 0 PER 100 WBC
PDW BLD-RTO: 15.5 % (ref 11.8–14.4)
PHOSPHORUS: 2.6 MG/DL (ref 2.5–4.5)
PLATELET # BLD: 225 K/UL (ref 138–453)
PMV BLD AUTO: 10.6 FL (ref 8.1–13.5)
POTASSIUM SERPL-SCNC: 3 MMOL/L (ref 3.7–5.3)
RBC # BLD: 4.01 M/UL (ref 4.21–5.77)
SODIUM BLD-SCNC: 137 MMOL/L (ref 135–144)
WBC # BLD: 11.3 K/UL (ref 3.5–11.3)

## 2021-10-26 PROCEDURE — 2060000000 HC ICU INTERMEDIATE R&B

## 2021-10-26 PROCEDURE — 6370000000 HC RX 637 (ALT 250 FOR IP): Performed by: NURSE PRACTITIONER

## 2021-10-26 PROCEDURE — 6360000002 HC RX W HCPCS: Performed by: INTERNAL MEDICINE

## 2021-10-26 PROCEDURE — 6370000000 HC RX 637 (ALT 250 FOR IP): Performed by: STUDENT IN AN ORGANIZED HEALTH CARE EDUCATION/TRAINING PROGRAM

## 2021-10-26 PROCEDURE — 6370000000 HC RX 637 (ALT 250 FOR IP): Performed by: INTERNAL MEDICINE

## 2021-10-26 PROCEDURE — 6360000002 HC RX W HCPCS: Performed by: NURSE PRACTITIONER

## 2021-10-26 PROCEDURE — 2580000003 HC RX 258: Performed by: NURSE PRACTITIONER

## 2021-10-26 PROCEDURE — 99232 SBSQ HOSP IP/OBS MODERATE 35: CPT | Performed by: INTERNAL MEDICINE

## 2021-10-26 PROCEDURE — 36415 COLL VENOUS BLD VENIPUNCTURE: CPT

## 2021-10-26 PROCEDURE — 82947 ASSAY GLUCOSE BLOOD QUANT: CPT

## 2021-10-26 PROCEDURE — 85027 COMPLETE CBC AUTOMATED: CPT

## 2021-10-26 PROCEDURE — 80069 RENAL FUNCTION PANEL: CPT

## 2021-10-26 PROCEDURE — 94660 CPAP INITIATION&MGMT: CPT

## 2021-10-26 PROCEDURE — 83735 ASSAY OF MAGNESIUM: CPT

## 2021-10-26 RX ORDER — POTASSIUM CHLORIDE 7.45 MG/ML
10 INJECTION INTRAVENOUS PRN
Status: DISCONTINUED | OUTPATIENT
Start: 2021-10-26 | End: 2021-10-29 | Stop reason: HOSPADM

## 2021-10-26 RX ORDER — UREA 10 %
3 LOTION (ML) TOPICAL NIGHTLY PRN
Status: DISCONTINUED | OUTPATIENT
Start: 2021-10-27 | End: 2021-10-29 | Stop reason: HOSPADM

## 2021-10-26 RX ORDER — POTASSIUM CHLORIDE 20 MEQ/1
40 TABLET, EXTENDED RELEASE ORAL PRN
Status: DISCONTINUED | OUTPATIENT
Start: 2021-10-26 | End: 2021-10-29 | Stop reason: HOSPADM

## 2021-10-26 RX ORDER — DIPHENHYDRAMINE HYDROCHLORIDE 50 MG/ML
25 INJECTION INTRAMUSCULAR; INTRAVENOUS ONCE
Status: COMPLETED | OUTPATIENT
Start: 2021-10-26 | End: 2021-10-26

## 2021-10-26 RX ORDER — HYDROCODONE BITARTRATE AND ACETAMINOPHEN 5; 325 MG/1; MG/1
1 TABLET ORAL ONCE
Status: COMPLETED | OUTPATIENT
Start: 2021-10-27 | End: 2021-10-27

## 2021-10-26 RX ORDER — QUETIAPINE FUMARATE 100 MG/1
100 TABLET, FILM COATED ORAL NIGHTLY
Status: DISCONTINUED | OUTPATIENT
Start: 2021-10-26 | End: 2021-10-28

## 2021-10-26 RX ADMIN — POTASSIUM BICARBONATE 50 MEQ: 977.5 TABLET, EFFERVESCENT ORAL at 20:11

## 2021-10-26 RX ADMIN — PANTOPRAZOLE SODIUM 40 MG: 40 TABLET, DELAYED RELEASE ORAL at 06:50

## 2021-10-26 RX ADMIN — POTASSIUM CHLORIDE 10 MEQ: 10 INJECTION, SOLUTION INTRAVENOUS at 09:07

## 2021-10-26 RX ADMIN — OXYBUTYNIN CHLORIDE 5 MG: 5 TABLET ORAL at 09:03

## 2021-10-26 RX ADMIN — LEVOTHYROXINE SODIUM 175 MCG: 175 TABLET ORAL at 06:50

## 2021-10-26 RX ADMIN — POTASSIUM CHLORIDE 10 MEQ: 10 INJECTION, SOLUTION INTRAVENOUS at 06:50

## 2021-10-26 RX ADMIN — PREGABALIN 150 MG: 75 CAPSULE ORAL at 20:11

## 2021-10-26 RX ADMIN — ASPIRIN 81 MG: 81 TABLET, CHEWABLE ORAL at 09:03

## 2021-10-26 RX ADMIN — ENOXAPARIN SODIUM 40 MG: 40 INJECTION SUBCUTANEOUS at 20:11

## 2021-10-26 RX ADMIN — POTASSIUM CHLORIDE 10 MEQ: 10 INJECTION, SOLUTION INTRAVENOUS at 13:46

## 2021-10-26 RX ADMIN — LOSARTAN POTASSIUM 50 MG: 50 TABLET, FILM COATED ORAL at 09:03

## 2021-10-26 RX ADMIN — PREGABALIN 150 MG: 75 CAPSULE ORAL at 09:03

## 2021-10-26 RX ADMIN — SODIUM CHLORIDE, PRESERVATIVE FREE 10 ML: 5 INJECTION INTRAVENOUS at 09:12

## 2021-10-26 RX ADMIN — FUROSEMIDE 40 MG: 40 TABLET ORAL at 17:51

## 2021-10-26 RX ADMIN — DULOXETINE HYDROCHLORIDE 60 MG: 30 CAPSULE, DELAYED RELEASE ORAL at 09:03

## 2021-10-26 RX ADMIN — ENOXAPARIN SODIUM 40 MG: 40 INJECTION SUBCUTANEOUS at 09:03

## 2021-10-26 RX ADMIN — INSULIN LISPRO 1 UNITS: 100 INJECTION, SOLUTION INTRAVENOUS; SUBCUTANEOUS at 12:37

## 2021-10-26 RX ADMIN — POTASSIUM CHLORIDE 10 MEQ: 10 INJECTION, SOLUTION INTRAVENOUS at 10:13

## 2021-10-26 RX ADMIN — OXYBUTYNIN CHLORIDE 5 MG: 5 TABLET ORAL at 20:11

## 2021-10-26 RX ADMIN — SODIUM CHLORIDE, PRESERVATIVE FREE 10 ML: 5 INJECTION INTRAVENOUS at 20:12

## 2021-10-26 RX ADMIN — TAMSULOSIN HYDROCHLORIDE 0.4 MG: 0.4 CAPSULE ORAL at 09:03

## 2021-10-26 RX ADMIN — FUROSEMIDE 40 MG: 40 TABLET ORAL at 09:03

## 2021-10-26 RX ADMIN — POTASSIUM BICARBONATE 50 MEQ: 977.5 TABLET, EFFERVESCENT ORAL at 09:03

## 2021-10-26 RX ADMIN — SODIUM CHLORIDE 25 ML: 9 INJECTION, SOLUTION INTRAVENOUS at 06:51

## 2021-10-26 RX ADMIN — CARVEDILOL 3.12 MG: 3.12 TABLET, FILM COATED ORAL at 09:03

## 2021-10-26 RX ADMIN — CARVEDILOL 3.12 MG: 3.12 TABLET, FILM COATED ORAL at 17:51

## 2021-10-26 RX ADMIN — DIPHENHYDRAMINE HYDROCHLORIDE 25 MG: 50 INJECTION, SOLUTION INTRAMUSCULAR; INTRAVENOUS at 03:45

## 2021-10-26 RX ADMIN — QUETIAPINE FUMARATE 100 MG: 100 TABLET ORAL at 20:12

## 2021-10-26 RX ADMIN — POTASSIUM CHLORIDE 10 MEQ: 10 INJECTION, SOLUTION INTRAVENOUS at 12:37

## 2021-10-26 ASSESSMENT — PAIN SCALES - GENERAL
PAINLEVEL_OUTOF10: 7
PAINLEVEL_OUTOF10: 5

## 2021-10-26 NOTE — PLAN OF CARE
Problem: Pain:  Goal: Pain level will decrease  Description: Pain level will decrease  10/26/2021 0354 by Roma Isaacs RN  Outcome: Ongoing  10/25/2021 1843 by Dago Velazquez RN  Outcome: Ongoing  Goal: Control of acute pain  Description: Control of acute pain  10/26/2021 0354 by Roma Isaacs RN  Outcome: Ongoing  10/25/2021 1843 by Dago Velazquez RN  Outcome: Ongoing  Goal: Control of chronic pain  Description: Control of chronic pain  10/26/2021 0354 by Roma Isaacs RN  Outcome: Ongoing  10/25/2021 1843 by Dago Velazquez RN  Outcome: Ongoing     Problem: Falls - Risk of:  Goal: Will remain free from falls  Description: Will remain free from falls  10/26/2021 0354 by Roma Isaacs RN  Outcome: Ongoing  10/25/2021 1843 by Dago Velazquez RN  Outcome: Ongoing  Goal: Absence of physical injury  Description: Absence of physical injury  10/26/2021 0354 by Roma Isaacs RN  Outcome: Ongoing  10/25/2021 1843 by Dago Velazquez RN  Outcome: Ongoing     Problem: Skin Integrity:  Goal: Will show no infection signs and symptoms  Description: Will show no infection signs and symptoms  10/26/2021 0354 by Roma Isaacs RN  Outcome: Ongoing  10/25/2021 1843 by Dago Velazquez RN  Outcome: Ongoing  Goal: Absence of new skin breakdown  Description: Absence of new skin breakdown  10/26/2021 0354 by Roma Isaacs RN  Outcome: Ongoing  10/25/2021 1843 by Dago Velazquez RN  Outcome: Ongoing

## 2021-10-26 NOTE — PROGRESS NOTES
[facility]  Office: [address], DO, Clarita Bah, DO, Veronica Crockett, DO, Waqas Hart Blood, DO, Carla Thomas MD, Don Hancock MD, Jameel Posey MD, Duncan Baker MD, Rashida Washington MD, Jaclyn Golden MD, Geni Cartagena MD, Tiffany Lemons MD, Joshua Byrd, DO, Mitzi Kelly DO, Yolette Gallo MD,  Noam Valencia DO, Amada Pennington MD, Noreen Duarte MD, Elvie Martin MD, Rachell Zepeda MD, Jerrell Hoyos MD, Kaya Kaye MD, [facility], [facility], [facility], [facility], Yadi Rios, CNP, Lucy Franks, CNS, Destiney Kevin, CNP, Vernon Alexandre, Clarisse Rosenbaum, CNP, Lamberto Wright, CNP, Gil Davis, CNP, Azul Banks, CNP, Berry Villavicencio PA-C, Irineo Leblanc, [facility] [place], Forest Ramires, CNP, Tangela Holland, CNP, Antony Weller, CNP, Elizabeth Hernandez, CNP, Mike Akers, CNP, Arlen Ha, CNP, Dari Quiroz, [address] [place] [address]    Progress Note    10/26/2021    4:56 PM    Name:   Barbara Conteh  MRN:     [id number]     Acct:      [de-identified]   Room:   [address] [place] [address] Day:  4  Admit Date:  10/21/2021  9:44 PM    PCP:   Aravind Deshpande MD  Code Status:  Full Code    Subjective:     C/C:   Chief Complaint   Patient presents with    Shortness of Breath    Fever     Interval History Status: not changed. Patient seen examined bedside. Breathing is improved. Discussed with patient's caregiver, patient reportedly is at baseline mentation. He does chronically have hallucinations and delusions. He denies any fevers, chills, nausea, vomiting, diarrhea    Brief History:     From [facility]  Tresa Kong a 61 y.o. Non- / non  male who presents with Shortness of Breath and Fever   and is admitted to the hospital for the management of Pneumonia.   27-year-old male past medical history of urinary tract infection, type 2 diabetes, hyperlipidemia, prior alcohol abuse, depression, hypothyroidism, morbid obesity, bipolar disorder, anxiety, obstructive sleep apnea, venous insufficiency of bilateral lower extremities, history of alcoholic cirrhosis, history of portal hypertension, chronic dependence of supplemental oxygen, diastolic heart failure presents with shortness of breath with concern for pneumonia seen on chest x-ray at outlying facility.  Patient was transported to [facility] for further care.  Of note patient was recently discharged from another [facility] due to urinary tract infection and was discharged on cipro.       Review of Systems:     Constitutional:  negative for chills, fevers, sweats  Respiratory:  negative for cough, dyspnea on exertion, shortness of breath, wheezing  Cardiovascular:  negative for chest pain, chest pressure/discomfort, lower extremity edema, palpitations  Gastrointestinal:  negative for abdominal pain, constipation, diarrhea, nausea, vomiting  Neurological:  negative for dizziness, headache    Medications: Allergies:     Allergies   Allergen Reactions    No Known Allergies        Current Meds:   Scheduled Meds:    QUEtiapine  100 mg Oral Nightly    furosemide  40 mg Oral BID    influenza virus vaccine  0.5 mL IntraMUSCular Prior to discharge    potassium bicarbonate  50 mEq Oral BID    losartan  50 mg Oral Daily    oxybutynin  5 mg Oral BID    cloNIDine  1 patch TransDERmal Weekly    carvedilol  3.125 mg Oral BID WC    enoxaparin  40 mg SubCUTAneous BID    sodium chloride flush  5-40 mL IntraVENous 2 times per day    insulin lispro  0-6 Units SubCUTAneous TID WC    insulin lispro  0-3 Units SubCUTAneous Nightly    DULoxetine  60 mg Oral QAM    pregabalin  150 mg Oral BID    aspirin  81 mg Oral Daily    levothyroxine  175 mcg Oral Daily    tamsulosin  0.4 mg Oral Daily    pantoprazole  40 mg Oral QAM AC     Continuous Infusions:    sodium chloride 25 mL (10/26/21 0651)    dextrose       PRN Meds: potassium chloride **OR** potassium alternative oral replacement **OR** potassium chloride, albuterol, sodium chloride flush, sodium chloride, ondansetron **OR** ondansetron, magnesium hydroxide, acetaminophen **OR** acetaminophen, glucose, dextrose, glucagon (rDNA), dextrose, albuterol sulfate HFA    Data:     Past Medical History:   has a past medical history of Anxiety and depression, Back pain, chronic, BPH (benign prostatic hyperplasia), CHF (congestive heart failure) ([facility] [address] Cirrhosis ([facility] Diabetes mellitus ([facility] GERD (gastroesophageal reflux disease), H/O cardiac catheterization, Hepatitis, Hiatal hernia, History of alcohol abuse, Hyperlipidemia, Hypertension, IBS (irritable bowel syndrome), Morbid obesity ([facility] Neuropathy, On home oxygen therapy, Pancreatitis, Primary osteoarthritis of right knee, Sleep apnea, Thyroid disease, and Urinary bladder neurogenic dysfunction. Social History:   reports that he has never smoked. He has never used smokeless tobacco. He reports that he does not drink alcohol and does not use drugs. Family History:   Family History   Adopted: Yes       Vitals:  BP (!) 179/75   Pulse 75   Temp 98.2 °F (36.8 °C) (Oral)   Resp 24   Ht 5' 10\" (1.778 m)   Wt (!) 399 lb 14.6 oz (181.4 kg)   SpO2 93%   BMI 57.38 kg/m²   Temp (24hrs), Av.4 °F (36.9 °C), Min:97.8 °F (36.6 °C), Max:98.9 °F (37.2 °C)    Recent Labs     10/25/21  1932 10/26/21  0709 10/26/21  1213 10/26/21  1632   POCGLU 140* 128* 152* 132*       I/O (24Hr):     Intake/Output Summary (Last 24 hours) at 10/26/2021 1656  Last data filed at 10/26/2021 0701  Gross per 24 hour   Intake 30 ml   Output 1850 ml   Net -1820 ml       Labs:  Hematology:  Recent Labs     10/24/21  0635 10/25/21  0813 10/26/21  0426   WBC 8.9 12.5* 11.3   RBC 3.83* 4.11* 4.01*   HGB 9.6* 10.4* 9.9*   HCT 32.9* 36.4* 34.8*   MCV 85.9 88.6 86.8   MCH 25.1* 25.3 24.7*   MCHC 29.2 28.6 28.4   RDW 15.1* 15.4* 15.5*    242 225   MPV 10.5 10.7 10.6     Chemistry:  Recent Labs     10/24/21  8565 10/25/21  0813 10/26/21  0426    143 137   K 3.3* 3.6* 3.0*   CL 97* 95* 93*   CO2 32* 29 28   GLUCOSE 122* 138* 126*   BUN 12 15 14   CREATININE 0.90 1.01 0.96   MG 2.2  --  2.0   ANIONGAP 11 19* 16   LABGLOM >60 >60 >60   GFRAA >60 >60 >60   CALCIUM 8.4* 9.1 8.5*   PHOS  --   --  2.6     Recent Labs     10/25/21  1235 10/25/21  1621 10/25/21  1932 10/26/21  0426 10/26/21  0709 10/26/21  1213 10/26/21  1632   LABALBU  --   --   --  3.2*  --   --   --    POCGLU 126* 132* 140*  --  128* 152* 132*     ABG:  Lab Results   Component Value Date    POCPH 7.442 10/25/2021    PHART 7.330 06/18/2014    POCPCO2 51.6 10/25/2021    RSX8IJT 68.0 06/18/2014    POCPO2 78.2 10/25/2021    PO2ART 84.0 06/18/2014    POCHCO3 35.2 10/25/2021    KSB6OKH 34.9 06/18/2014    NBEA NOT REPORTED 10/25/2021    PBEA 10 10/25/2021    UBR0LRH NOT REPORTED 10/25/2021    WTVH3NMV 96 10/25/2021    T4YVOTZC 96.5 06/18/2014    FIO2 4.0 10/25/2021     Lab Results   Component Value Date/Time    SPECIAL NOT REPORTED 10/21/2021 10:37 PM     Lab Results   Component Value Date/Time    CULTURE NO GROWTH 10/21/2021 10:37 PM       Radiology:  XR CHEST (SINGLE VIEW FRONTAL)    Result Date: 10/24/2021  Stable cardiomegaly with low lung volumes and small bilateral pleural effusions. Patchy bilateral airspace disease is not significantly changed from prior. CT HEAD WO CONTRAST    Result Date: 10/22/2021  No acute intracranial abnormality. MRI may be obtained if clinically indicated. CT ABDOMEN PELVIS W IV CONTRAST Additional Contrast? None    Result Date: 10/22/2021  Nonspecific presacral stranding. Otherwise no evidence of acute infectious or inflammatory process in the abdomen or pelvis. Cirrhosis with splenomegaly. XR CHEST PORTABLE    Result Date: 10/21/2021  1. Cardiomegaly 2. Interval worsening of the interstitial alveolar opacities bilaterally, differential considerations include worsening edema versus infection 3.  Small right pleural effusion     CT CHEST PULMONARY EMBOLISM W CONTRAST    Result Date: 10/22/2021  1. Suboptimal evaluation of the subsegmental and more peripheral pulmonary arteries due to motion artifact. Given this, no obvious acute pulmonary thromboembolic disease. Prominent main pulmonary artery suggests pulmonary hypertension. 2.  Multifocal bilateral heterogeneous pulmonary opacities. Findings may represent a combination of atelectasis and pulmonary edema. Multifocal pneumonia is an additional consideration. 3.  Small bilateral pleural effusions with associated lower lobe relaxation atelectasis. 4.  Round 2.5 cm nodule in the subcutaneous soft tissues posterior to the left nipple demonstrates simple fluid attenuation and may represent a benign cyst.  This could be further evaluated with focused ultrasound as warranted. Physical Examination:        General appearance:  alert, cooperative and no distress  Mental Status:  oriented to person, place and time and normal affect  Lungs:  clear to auscultation bilaterally, normal effort  Heart:  regular rate and rhythm, no murmur  Abdomen:  soft, nontender, nondistended, normal bowel sounds, no masses, hepatomegaly, splenomegaly  Extremities:  no edema, redness, tenderness in the calves  Skin:  no gross lesions, rashes, induration  Psyh: pt with visual hallucinations.      Assessment:        Hospital Problems         Last Modified POA    * (Principal) Acute on chronic diastolic congestive heart failure ([address] 10/24/2021 Yes    Overview Signed 10/14/2017  7:34 PM by [facility]     Updating Deprecated Diagnoses         Alcoholic cirrhosis of liver ([address] sober since 2013 10/22/2021 Yes    Bipolar disorder ([address] 10/22/2021 Yes    Type 2 diabetes mellitus with diabetic neuropathy, with long-term current use of insulin ([address] 10/22/2021 Yes    FABI (obstructive sleep apnea) 10/22/2021 Yes    Primary osteoarthritis of right knee (Chronic) 10/22/2021 Yes    Type 2 diabetes mellitus with diabetic neuropathy ([facility] (Chronic) 10/22/2021 Yes    Acquired hypothyroidism 10/22/2021 Yes    Urine retention 10/22/2021 Yes    Portal hypertension ([address] (Chronic) 10/22/2021 Yes    Venous stasis dermatitis of both lower extremities (Chronic) 10/22/2021 Yes    Chronic indwelling Clemons catheter (Chronic) 10/22/2021 Yes    Anxiety and depression 10/22/2021 Yes    BPH (benign prostatic hyperplasia) 10/22/2021 Yes    Morbid obesity ([facility] [address] [facility] 10/22/2021 Yes    On home oxygen therapy 10/22/2021 Yes    Overview Signed 11/4/2020  2:08 PM by SUNSHINE Macedo NP     prn         Pneumonia 10/24/2021 Yes          Plan:        Acute respiratory failure with hypoxia-improving -likely pulmonary edema, Lasix 40 mg IV last few days, diuresed approximately 10 L since admission. Transition to 40 mg oral as patient is back at baseline oxygen. Hallucinationscould be from underlying Biopolar. Will have psych see patient. Essential hypertensionpatient is on Catapres patch, Coreg, Cozaar. No changes to blood pressure at this time as blood pressure measurements seem to be unreliable. Patient has had recorded blood pressures of systolic 900. Hypothyroidismcontinue Synthroid  Urinary retention f/u outpatient, chronic urinary retention, continue Flomax did prevent. Urology follow-up  Type 2 diabetescontinue sliding scale and POC glucose  Super morbid obesity  FABI  Bipolar disorder  Disposition: Patient is at baseline oxygenation, still diuresing well. Replace potassium, continue Catapres, possible discharge next 24 to 48 hours pending SNF placement.      Araceli Roberto DO  10/26/2021  4:56 PM

## 2021-10-26 NOTE — PROGRESS NOTES
Pt caregiver at the bedside was able to give a baseline assessment of the patient. Caregiver states that patient is intermittently confused at times. Reports that he does see things due to needing cataract surgery. Pt does have anxiety that requires home medication to relieve.

## 2021-10-26 NOTE — PROGRESS NOTES
PULMONARY & CRITICAL CARE MEDICINE PROGRESS NOTE     Patient:  Rogelio Fulton  MRN: [id number]  Admit date: 10/21/2021  Primary Care Physician: Magali Palma MD  Consulting Physician: Jagdish Aragon DO    HISTORY     CHIEF COMPLAINT/REASON FOR CONSULT: SOB    HISTORY OF PRESENT ILLNESS:  The patient is a 61 y.o. male care with complaint of shortness of breath and fever. Patient transferred from [facility] after treating with antibiotics for pneumonia. Poor historian, delayed responses. However alert oriented x3. Stated he has been sick for the past few days, shortness of breath, low-grade fevers. Denies any sick contacts. Lives with his son. Uses wheelchair for ambulation, able to take care of himself. Patient received [employer] code vaccine. Not received flu shot. Denies any smoking history. Never diagnosed with any COPD. Uses 34L oxygen at home. Not using any CPAP at home. Denies chest pain, urinary symptoms, nausea, vomiting, headache. Significant history of T2DM, hyperlipidemia, recent UTI, alcohol abuse, depression, hypothyroidism, morbid obesity, FABI, bilateral lower extremity venous insufficiency, alcohol cirrhosis, hypertension, diastolic heart failure on oxygen. CT PE negative for PE, however suboptimal evaluation. Showing pulmonary hypertension, multifocal opacities concerning for atelectasis/pneumonia. CT abdomen showing liver cirrhosis with splenomegaly. Echo with severe LVH in 2014. CT PE concerning for pulmonary hypertension. Morbid obesity. History of MDRO. INTERVAL HISTORY:  Pt seen and examined  In active visual and auditory hallucinations, seeing cartoons, people laughing other than my voice. Stated he is afraid of eating, poisoned food  But he was able to give me his son's phone number, remote memory intact. H/o anxiety and depression.     Used BiPAP last night, currently hemodynamically stable on NC    PAST MEDICAL HISTORY:        Diagnosis Date    Anxiety and depression     Back pain, chronic     BPH (benign prostatic hyperplasia)     CHF (congestive heart failure) (HCC)     [facility]osis ([facility]     Diabetes mellitus ([facility]     not checking bloodsugar at home    GERD (gastroesophageal reflux disease)     H/O cardiac catheterization not sure of date    no stents    Hepatitis     Pt does not know the type.  Hiatal hernia     History of alcohol abuse     Sober since 2013    Hyperlipidemia     not taking any medication    Hypertension     IBS (irritable bowel syndrome)     Morbid obesity ([facility]     Neuropathy     feet,toes    On home oxygen therapy     DME for home oxgygen at 2.5 lpm at HS and as needed    Pancreatitis     x 5 episodes    Primary osteoarthritis of right knee     Sleep apnea     suspected juany, never has had PSG study.   HAS NO MACHINE    Thyroid disease     Urinary bladder neurogenic dysfunction      PAST SURGICAL HISTORY:        Procedure Laterality Date    ABDOMEN SURGERY      hernia repair x4 (ventral)    COLONOSCOPY      2012    CYSTOSCOPY  01/24/2018    CYSTOSCOPY  02/20/2019    CYSTOSCOPY N/A 2/20/2019    CYSTOSCOPY DILATION performed by Loly Barry MD at [address] [facility] [place] 8/23/2019    CYSTOSCOPY/ DILATION NICOLE CATHETER EXCHANGE performed by Loly Barry MD at [address], COLON, DIAGNOSTIC     [address]  05/20/2018    repair and reduction of recurrent incarcerated incisional hernia with mesh    [place] CYSTOURETHROSCOPY N/A 1/24/2018    CYSTOSCOPY Abron Fly performed by Loly Barry MD at [address] [facility] Bilateral 8/22/2017    FOOT [address] [facility] with bone bx performed by Marcial Zendejas DPM at [address] [facility] EGD TRANSORAL BIOPSY SINGLE/MULTIPLE N/A 7/19/2017    EGD BIOPSY performed by Bianka Lebron MD at [address]  07/19/2017    polypectomy    UPPER GASTROINTESTINAL ENDOSCOPY N/A 3/14/2019    EGD BIOPSY performed by Kavin Ribeiro MD at [address] N/A 5/20/2018    REPAIR AND REDUCTION OF RECURRENT INCARCERATED INCISIONAL HERNIA WITH MESH performed by Viri Martino MD at [facility]:       Adopted: Yes     SOCIAL HISTORY:   TOBACCO:   reports that he has never smoked. He has never used smokeless tobacco.  ETOH:  reports no history of alcohol use. DRUGS: reports no history of drug use. AVOCATION/OCCUPATIONAL EXPOSURE:    The patient denies asbestos, silica dust, coal, foundry, quarry or [employer] exposure. The patient denies  to having pet dogs, cats, turtles or exotic birds at home. There is no history of TB or TB exposure. There is no exposure to sick contacts. Travel history is not significant history of risk factors for pulmonary disease. The patient denies using [employer]. ALLERGIES:    Allergies   Allergen Reactions    No Known Allergies          HOME MEDICATIONS:  Prior to Admission medications    Medication Sig Start Date End Date Taking? Authorizing Provider   ciprofloxacin (CIPRO) 500 MG tablet Take 1 tablet by mouth every 12 hours for 10 days 10/18/21 10/28/21  Faith Samayoa DO   insulin glargine (BASAGLAR KWIKPEN) 100 UNIT/ML injection pen Inject 76 Units into the skin 2 times daily    Historical Provider, MD   pregabalin (LYRICA) 150 MG capsule Take 150 mg by mouth 2 times daily.     Historical Provider, MD   ibuprofen (ADVIL;MOTRIN) 600 MG tablet Take 600 mg by mouth every 6 hours as needed for Pain    Historical Provider, MD   SITagliptin (JANUVIA) 100 MG tablet Take 100 mg by mouth daily    Historical Provider, MD   glipiZIDE (GLUCOTROL) 10 MG tablet Take 10 mg by mouth 2 times daily (before meals)    Historical Provider, MD   metFORMIN (GLUCOPHAGE) 500 MG tablet Take 500 mg by mouth 2 times daily (with meals)    Historical Provider, MD   nystatin (MYCOSTATIN) 993135 UNIT/GM cream Apply topically 2 times daily as needed (to skin folds)    Historical Provider, MD   clonazePAM (KLONOPIN) 1 MG tablet Take 1 tablet by mouth 2 times daily as needed for Anxiety for up to 3 days. 1/14/21 1/17/21  Jameel Posey MD   oxyCODONE-acetaminophen (PERCOCET) 7.5-325 MG per tablet Take 1 tablet by mouth every 6 hours as needed for Pain. ; Dispense 30 1/5/21   Historical Provider, MD   QUEtiapine (SEROQUEL XR) 50 MG extended release tablet Take 50 mg by mouth nightly    Historical Provider, MD   triamcinolone (KENALOG) 0.025 % cream Apply topically 2 times daily as needed    Historical Provider, MD   diphenoxylate-atropine (LOMOTIL) 2.5-0.025 MG per tablet Take 1 tablet by mouth 4 times daily as needed for Diarrhea.     Historical Provider, MD   fluocinonide (LIDEX) 0.05 % external solution Apply topically as needed (scalp itching)    Historical Provider, MD   ketoconazole (NIZORAL) 2 % shampoo Apply topically Twice a Week    Historical Provider, MD   albuterol sulfate HFA (PROAIR HFA) 108 (90 Base) MCG/ACT inhaler Inhale 2 puffs into the lungs every 6 hours as needed for Wheezing    Historical Provider, MD   tamsulosin (FLOMAX) 0.4 MG capsule Take 1 capsule by mouth daily 1/24/18   Estee Whitlock MD   oxybutynin (DITROPAN) 5 MG tablet Take 1 tablet by mouth 2 times daily 12/12/17   Alicia OquendoloDO erik   bumetanide (BUMEX) 2 MG tablet Take 2 mg by mouth 2 times daily    Historical Provider, MD   aspirin 81 MG EC tablet Take 1 tablet by mouth daily 7/26/17   Ye Mcguire DO   nadolol (CORGARD) 40 MG tablet Take 40 mg by mouth daily    Historical Provider, MD   levothyroxine (SYNTHROID) 175 MCG tablet Take 175 mcg by mouth Daily     Historical Provider, MD   nystatin (MYCOSTATIN) 650302 UNIT/GM powder Apply topically 2 times daily as needed TO ABDOMINAL FOLDS, GROIN     Historical Provider, MD   esomeprazole (NEXIUM) 40 MG capsule Take 40 mg by mouth 2 times daily     Historical Provider, MD DULoxetine (CYMBALTA) 60 MG capsule Take 60 mg by mouth every morning     Historical Provider, MD   venlafaxine (EFFEXOR-XR) 150 MG XR capsule Take 150 mg by mouth daily. Historical Provider, MD     IMMUNIZATIONS:  There is no immunization history for the selected administration types on file for this patient. REVIEW OF SYSTEMS:  Unable to obtain due to drowsiness    PHYSICAL EXAMINATION     VITAL SIGNS:   LAST-  BP (!) 156/81   Pulse 78   Temp 98.9 °F (37.2 °C) (Oral)   Resp 24   Ht 5' 10\" (1.778 m)   Wt (!) 399 lb 14.6 oz (181.4 kg)   SpO2 96%   BMI 57.38 kg/m²   8-24 HR RANGE-  TEMP Temp  Av.3 °F (36.8 °C)  Min: 97.1 °F (36.2 °C)  Max: 98.9 °F (47.7 °C)   BP Systolic (14AIP), WUX:528 , Min:128 , BGS:951      Diastolic (28ZBA), JTJ:50, Min:65, Max:88     PULSE Pulse  Av.4  Min: 65  Max: 78   RR Resp  Av.3  Min: 16  Max: 24   O2 SAT SpO2  Av %  Min: 96 %  Max: 100 %   OXYGEN DELIVERY O2 Flow Rate (L/min)  Av L/min  Min: 2 L/min  Max: 2 L/min     SYSTEMIC EXAMINATION:   General appearance -lethargy improved, more alert today, morbidly obese   Mental status oriented x2, restlessness improved  Eyes - pupils equal and reactive, extraocular eye movements intact, sclera anicteric  Ears - not examined  Nose - normal and patent, no erythema, discharge  Mouth - mucous membranes moist, pharynx normal without lesions  Neck - supple, no significant adenopathy  Chest -limited exam due to body habitus. Decreased breath sounds throughout. No wheezing, no rales. Heart - normal rate, regular rhythm, normal S1, S2, no murmurs, rubs, clicks or gallops  Abdomen - soft, nontender, nondistended, no masses or organomegaly  Neurological - motor and sensory grossly normal bilaterally  Musculoskeletal - no joint tenderness, deformity or swelling  Extremities - peripheral pulses normal, pitting and nonpitting edema, dermatitis changes on lower extremity.       DATA REVIEW     Medications: Current Inpatient  Scheduled Meds:   furosemide  40 mg Oral BID    influenza virus vaccine  0.5 mL IntraMUSCular Prior to discharge    potassium bicarbonate  50 mEq Oral BID    losartan  50 mg Oral Daily    oxybutynin  5 mg Oral BID    cloNIDine  1 patch TransDERmal Weekly    carvedilol  3.125 mg Oral BID WC    QUEtiapine  25 mg Oral Nightly    enoxaparin  40 mg SubCUTAneous BID    sodium chloride flush  5-40 mL IntraVENous 2 times per day    insulin lispro  0-6 Units SubCUTAneous TID WC    insulin lispro  0-3 Units SubCUTAneous Nightly    DULoxetine  60 mg Oral QAM    pregabalin  150 mg Oral BID    aspirin  81 mg Oral Daily    levothyroxine  175 mcg Oral Daily    tamsulosin  0.4 mg Oral Daily    pantoprazole  40 mg Oral QAM AC     Continuous Infusions:   sodium chloride 25 mL (10/26/21 0651)    dextrose       INPUT/OUTPUT:  In: 30 [I.V.:30]  Out: 1850 [Urine:1850]    LABS:-  ABGs:   No results found for: PH, PCO2, PO2, HCO3, O2SAT  No results for input(s): PHART, PO2ART, KAB2TMI, PXA1BDL, BEART, A1SALSJM in the last 72 hours. Lab Results   Component Value Date    POCPH 7.442 10/25/2021    POCPCO2 51.6 (H) 10/25/2021    POCPO2 78.2 (L) 10/25/2021    POCHCO3 35.2 (H) 10/25/2021    HIZL4RPE 96 10/25/2021     CBC:   Recent Labs     10/24/21  0635 10/25/21  0813 10/26/21  0426   WBC 8.9 12.5* 11.3   HGB 9.6* 10.4* 9.9*   HCT 32.9* 36.4* 34.8*   MCV 85.9 88.6 86.8    242 225   RBC 3.83* 4.11* 4.01*   MCH 25.1* 25.3 24.7*   MCHC 29.2 28.6 28.4   RDW 15.1* 15.4* 15.5*     BMP:   Recent Labs     10/24/21  0635 10/25/21  0813 10/26/21  0426    143 137   K 3.3* 3.6* 3.0*   CL 97* 95* 93*   CO2 32* 29 28   BUN 12 15 14   CREATININE 0.90 1.01 0.96   GLUCOSE 122* 138* 126*   PHOS  --   --  2.6     Liver Function Test:   Recent Labs     10/26/21  0426   LABALBU 3.2*     Amylase/Lipase:  No results for input(s): AMYLASE, LIPASE in the last 72 hours.   Coagulation Profile:   No results for input(s): INR, PROTIME, APTT in the last 72 hours. Cardiac Enzymes:  No results for input(s): CKTOTAL, CKMB, CKMBINDEX, TROPONINI in the last 72 hours. Lactic Acid:  Lab Results   Component Value Date    LACTA 2.0 11/06/2020    LACTA 1.9 11/04/2020    LACTA 0.8 12/07/2017     BNP:   Lab Results   Component Value Date    BNP NOT REPORTED 06/18/2014    BNP NOT REPORTED 06/17/2014    BNP 8 07/07/2012     D-Dimer:  Lab Results   Component Value Date    DDIMER 2.49 05/04/2016     Others:   Lab Results   Component Value Date    TSH 16.83 (H) 10/21/2021    V2NTHOW 5.7 06/22/2014     Lab Results   Component Value Date    MARISA NEGATIVE 06/19/2014    SEDRATE 77 (H) 11/06/2020    CRP 42.3 (H) 11/06/2020     No results found for: Julia Lopez  Lab Results   Component Value Date    IRON 29 (L) 05/07/2016    TIBC 291 05/07/2016    FERRITIN 109 05/07/2016     No results found for: SPEP, UPEP  No results found for: PSA, CEA, , QR1308,   Microbiology:    Pathology:    Radiology Reports:  XR CHEST (SINGLE VIEW FRONTAL)   Final Result   Stable cardiomegaly with low lung volumes and small bilateral pleural   effusions. Patchy bilateral airspace disease is not significantly changed   from prior. CT HEAD WO CONTRAST   Final Result   No acute intracranial abnormality. MRI may be obtained if clinically   indicated. CT CHEST PULMONARY EMBOLISM W CONTRAST   Final Result   1. Suboptimal evaluation of the subsegmental and more peripheral pulmonary   arteries due to motion artifact. Given this, no obvious acute pulmonary   thromboembolic disease. Prominent main pulmonary artery suggests pulmonary   hypertension. 2.  Multifocal bilateral heterogeneous pulmonary opacities. Findings may   represent a combination of atelectasis and pulmonary edema. Multifocal   pneumonia is an additional consideration. 3.  Small bilateral pleural effusions with associated lower lobe relaxation   atelectasis.       4.  Round 2.5 cm nodule in the subcutaneous soft tissues posterior to the   left nipple demonstrates simple fluid attenuation and may represent a benign   cyst.  This could be further evaluated with focused ultrasound as warranted. CT ABDOMEN PELVIS W IV CONTRAST Additional Contrast? None   Final Result   Nonspecific presacral stranding. Otherwise no evidence of acute infectious   or inflammatory process in the abdomen or pelvis. Cirrhosis with splenomegaly. XR CHEST PORTABLE   Final Result   1. Cardiomegaly   2. Interval worsening of the interstitial alveolar opacities bilaterally,   differential considerations include worsening edema versus infection   3. Small right pleural effusion             Pulmonary Function test:    Polysomnogram:    Echocardiogram:   No results found for this or any previous visit. Cardiac Catheterization:   No results found for this or any previous visit. ASSESSMENT AND PLAN     Assessment:  Acute hypoxic respiratory failure  Acute delirium Vs schizophrenia  Obesity hypoventilation on 45 L NC at home. Obstructive sleep apnea  Never smoked, no history of COPD/asthma or any lung disease. morbid obesity  Pulmonary HTN  Multiple comorbidities: T2DM, hyperlipidemia, recent UTI, alcohol abuse, depression, hypothyroidism, bilateral lower extremity venous insufficiency, Venous stasis dermatitis, alcohol cirrhosis, hypertension, diastolic heart failure. Plan:    I personally interviewed/examined the patient; reviewed interval history, interpreted all available radiographic and laboratory data at the time of service. Consider Psychiatry evaluation. Continue BiPAP even day time as tolerated, needs CPAP/BiPAP for discharge as well. Patient on home baseline 4-5 L O2. Keep saturations 92-94%  Echo with preserved EF, no pulmonary HTN.   Continue incentive spirometry, pulmonary toilet, aspiration precautions and bronchodilators  Continue to monitor I/O with a goal of even/negative fluid balance      DVT and stress ulcer prophylaxis  Physical/occupational therapy; increase activity as tolerated. Alverto Jolly MD,    Pulmonary and Critical Care Medicine           10/26/2021, 10:54 AM  Attending Physician Statement  I have discussed the care of Hermes Mckeon, including pertinent history and exam findings,  with the resident. I have seen and examined the patient and the key elements of all parts of the encounter have been performed by me. I agree with the assessment, plan and orders as documented by the resident with additions . Treatment plan Discussed with nursing staff in detail , all questions answered . Electronically signed by Ruchi Bentley MD on   10/26/21 at 2:04 PM EDT    Please note that this chart was generated using voice recognition Dragon dictation software. Although every effort was made to ensure the accuracy of this automated transcription, some errors in transcription may have occurred.

## 2021-10-26 NOTE — PROGRESS NOTES
[facility] - Progress Note    Today's Date and Time: 10/26/2021, 11:48 AM    Impression :   Chronic hypoxemic and hypercapnic respiratory failure,  DM2  Morbid Obesity with BMI 57  Essential HTN  Diastolic CHF with elevated ProBNP and Troponins  Multifocal changes and bilateral effusions by CT of chest. Most likely secondary to CHF rather than bacterial pneumonia    Recommendations:   Monitor off antibiotics   Diuresis  02 support    Medical Decision Making/Summary/Discussion:10/26/2021       Infection Control Recommendations   [place] Precautions      Antimicrobial Stewardship Recommendations     Discontinuation of therapy  Coordination of Outpatient Care:   Estimated Length of IV antimicrobials: 10-22-21  Patient will need Midline Catheter Insertion: no  Patient will need PICC line Insertion:no  Patient will need: Home IV , [place],  SNF,  LTAC: TBD  Patient will need outpatient wound care:    Chief complaint/reason for consultation:   Multifocal Pneumonia      History of Present Illness:   Barbara Conteh is a 61y.o.-year-old  male who was initially admitted on 10/21/2021. Patient seen at the request of     INITIAL HISTORY:    Patient is known to our service. Previously treated by Dr Chucky Torres during his admission to [facility] on 11/4/2020 because of toxic metabolic encephalopathy and UTI with E coli and Enterococus.      Patient has known medical history of morbid obesity, pancreatitis, urinary retention status post indwelling Clemons catheter placement, CHF, diabetes mellitus type 2, essential hypertension, morbid obesity with BMI of 57. Patient had a urine culture at [facility] in August 2019 that was positive for Enterococcus, E. coli, Pseudomonas. He had a urine culture at [facility] in January 2020 with E. coli and Enterococcus.      More recently he had E. coli and Enterococcus in urine on 11-6-20.      Then Klebsiella pneumoniae on 10-14-21 and 10-15-21 in blood and urine. The patient was admitted to [facility] and treated with Cipro for the above infection. He had Clemons exchanged and was discharged to a SNF to complete treatment with Cipro. His CXR on 10-15-21 did not show any infiltrates. Current cultures of blood and urine on 10-21-21 show no growth but patient's CXR shows multifocal bilateral heterogenous opacities, bilateral posterior lobe consolidations and small pleural effusions. Findings interpreted by Radiologist as a combination of pulmonary edema and atelectasis with also the possibility of multifocal pneumonia. My own interpretation is that they are caused by fluid retention and atelectasis and not by bacterial pneumonia. WBC is normal. He is afebrile and not expectorating sputum. ProBNP and troponins are elevated. I would D/C Zosyn as it will add to fluid and Na loads. He has no Hx of MRSA. CURRENT EVALUATION : 10/26/2021    Afebrile  VS stable, HTN    Comfortable, pleasant  Clemons in place with clear urine  Appears somewhat confused, anxious  He goes from one subject to another and keeps asking the same questions. Denies SOB  On 2 L/min nasal 02  RR 24  02 sat 96      Labs, X rays reviewed: 10/26/2021    BUN: 12-->15-->14  Cr: 0.98-->1.01-->0.96    WBC: 8,8-->12.5-->11.3  Hb:9,5-->10.4-->9.9  Plat: 144-->242-->225    ProBNP 957  CRP 42.3  Troponin HS  57-->59    SARS Co V 2:   1-14-21: negative   10-15-21: negative   10-21-21: negative     Cultures:  Urine:  10-14-21: Klebsiella  10-21-21: No growth    Blood:  10-14-21: Klebsiella  10-15-21: Klebsiella  10-21-21: No growth     Sputum :    Wound:      Discussed with patient, RN, IM.    10-22-21:      I have personally reviewed the past medical history, past surgical history, medications, social history, and family history, and I have updated the database accordingly.   Past Medical History:     Past Medical History:   Diagnosis Date    Anxiety and depression     Back pain, chronic     BPH (benign prostatic hyperplasia)     CHF (congestive heart failure) (HCC)     Cir[facility]osis [facility]     Diabetes mellitus ([facility]     not checking bloodsugar at home    GERD (gastroesophageal reflux disease)     H/O cardiac catheterization not sure of date    no stents    Hepatitis     Pt does not know the type.  Hiatal hernia     History of alcohol abuse     Sober since 2013    Hyperlipidemia     not taking any medication    Hypertension     IBS (irritable bowel syndrome)     Morbid obesity ([facility]     Neuropathy     feet,toes    On home oxygen therapy     DME for home oxgygen at 2.5 lpm at HS and as needed    Pancreatitis     x 5 episodes    Primary osteoarthritis of right knee     Sleep apnea     suspected juany, never has had PSG study.   HAS NO MACHINE    Thyroid disease     Urinary bladder neurogenic dysfunction        Past Surgical  History:     Past Surgical History:   Procedure Laterality Date    ABDOMEN SURGERY      hernia repair x4 (ventral)    COLONOSCOPY      2012    CYSTOSCOPY  01/24/2018    CYSTOSCOPY  02/20/2019    CYSTOSCOPY N/A 2/20/2019    CYSTOSCOPY DILATION performed by Lacy Haynes MD at [address] [place] [address] 8/23/2019    CYSTOSCOPY/ DILATION NICOLE CATHETER EXCHANGE performed by Lacy Haynes MD at [facility], DIAGNOSTIC     [address]  05/20/2018    repair and reduction of recurrent incarcerated incisional hernia with mesh    [place] CYSTOURETHROSCOPY N/A 1/24/2018    CYSTOSCOPY Melissa Malik performed by Lacy Haynes MD at [address] Bilateral 8/22/2017    FOOT [facility] with bone bx performed by Christopher Melgar DPM at [address] EGD TRANSORAL BIOPSY SINGLE/MULTIPLE N/A 7/19/2017    EGD BIOPSY performed by Sigifredo Saunders MD at [address]  07/19/2017    polypectomy    UPPER GASTROINTESTINAL ENDOSCOPY N/A 3/14/2019    EGD BIOPSY performed by Emily Alfredo MD at [facility] N/A 5/20/2018    REPAIR AND REDUCTION OF RECURRENT INCARCERATED INCISIONAL HERNIA WITH MESH performed by David Keys MD at [facility]       Medications:      QUEtiapine  100 mg Oral Nightly    furosemide  40 mg Oral BID    influenza virus vaccine  0.5 mL IntraMUSCular Prior to discharge    potassium bicarbonate  50 mEq Oral BID    losartan  50 mg Oral Daily    oxybutynin  5 mg Oral BID    cloNIDine  1 patch TransDERmal Weekly    carvedilol  3.125 mg Oral BID WC    enoxaparin  40 mg SubCUTAneous BID    sodium chloride flush  5-40 mL IntraVENous 2 times per day    insulin lispro  0-6 Units SubCUTAneous TID WC    insulin lispro  0-3 Units SubCUTAneous Nightly    DULoxetine  60 mg Oral QAM    pregabalin  150 mg Oral BID    aspirin  81 mg Oral Daily    levothyroxine  175 mcg Oral Daily    tamsulosin  0.4 mg Oral Daily    pantoprazole  40 mg Oral QAM AC       Social History:     Social History     Socioeconomic History    Marital status:      Spouse name: Not on file    Number of children: Not on file    Years of education: Not on file    Highest education level: Not on file   Occupational History    Not on file   Tobacco Use    Smoking status: Never Smoker    Smokeless tobacco: Never Used   Vaping Use    Vaping Use: Never used   Substance and Sexual Activity    Alcohol use: No     Comment: quit drinking 2012    Drug use: No    Sexual activity: Not on file   Other Topics Concern    Not on file   Social History Narrative    Not on file     Social Determinants of Health     Financial Resource Strain:     Difficulty of Paying Living Expenses:    Food Insecurity:     Worried About Running Out of Food in the Last Year:     Ran Out of Food in the Last Year:    Transportation Needs:     Lack of Transportation (Medical):      Lack of Transportation (Non-Medical):    Physical No embolic phenomena. ENT: Oropharynx clear, without erythema, exudate, or thrush. No tenderness of sinuses. Mouth/throat: mucosa pink and moist. No lesions. Dentition in good repair. Neck:Supple, without lymphadenopathy. Thyroid normal, No bruits. Pulmonary/Chest: Distant breath sounds, decreased sounds. Dullness at bases  Cardiovascular: Regular rate and rhythm without murmurs, rubs, or gallops. Abdomen: Soft, non tender. Bowel sounds normal. No organomegaly  All four Extremities: No cyanosis, clubbing, edema, or effusions. Neurologic: No gross sensory or motor deficits. Skin: Warm and dry with good turgor. Signs of peripheral arterial and venous insufficiency. Pitting and non pitting edema. Discoloration from venous stasis. Medical Decision Making -Laboratory:   I have independently reviewed/ordered the following labs:    CBC with Differential:   Recent Labs     10/25/21  0813 10/26/21  0426   WBC 12.5* 11.3   HGB 10.4* 9.9*   HCT 36.4* 34.8*    225     BMP:   Recent Labs     10/24/21  0635 10/24/21  0635 10/25/21  0813 10/26/21  0426      < > 143 137   K 3.3*   < > 3.6* 3.0*   CL 97*   < > 95* 93*   CO2 32*   < > 29 28   BUN 12   < > 15 14   CREATININE 0.90   < > 1.01 0.96   MG 2.2  --   --  2.0    < > = values in this interval not displayed. Hepatic Function Panel:   Recent Labs     10/26/21  0426   LABALBU 3.2*     No results for input(s): RPR in the last 72 hours. No results for input(s): HIV in the last 72 hours. No results for input(s): BC in the last 72 hours.   Lab Results   Component Value Date    MUCUS NOT REPORTED 10/21/2021    RBC 4.01 10/26/2021    RBC 3.93 04/02/2012    TRICHOMONAS NOT REPORTED 10/21/2021    WBC 11.3 10/26/2021    YEAST NOT REPORTED 10/21/2021    TURBIDITY Clear 10/21/2021     Lab Results   Component Value Date    CREATININE 0.96 10/26/2021    GLUCOSE 126 10/26/2021    GLUCOSE 102 08/30/2011       Medical Decision Making-Imaging:     EXAMINATION:   CTA OF THE CHEST 10/21/2021 11:37 pm       TECHNIQUE:   CTA of the chest was performed after the administration of intravenous   contrast.  Multiplanar reformatted images are provided for review.  MIP   images are provided for review. Dose modulation, iterative reconstruction,   and/or weight based adjustment of the mA/kV was utilized to reduce the   radiation dose to as low as reasonably achievable.       COMPARISON:   May 4, 2016.       HISTORY:   ORDERING SYSTEM PROVIDED HISTORY: Dyspnea, low saturation   TECHNOLOGIST PROVIDED HISTORY:   Dyspnea, low saturation   Decision Support Exception - unselect if not a suspected or confirmed   emergency medical condition->Emergency Medical Condition (MA)   Reason for Exam: Dyspnea, low saturation   Acuity: Unknown   Type of Exam: Unknown       FINDINGS:   Pulmonary Arteries: Suboptimal evaluation of the subsegmental and more   peripheral pulmonary arteries due to motion artifact.  Given this, no obvious   acute pulmonary thromboembolic disease.  Prominent main pulmonary artery   measures 3.6 cm in diameter.       Mediastinum: No evidence of mediastinal lymphadenopathy.  The heart and   pericardium demonstrate no acute abnormality.  There is no acute abnormality   of the thoracic aorta.  Aortic valve leaflet calcifications.       Lungs/pleura: Respiratory motion.  Expiratory imaging.  Multifocal bilateral   heterogeneous opacities.  Bilateral posterior lower lobe consolidations. Small bilateral pleural effusions.  No pneumothorax.  No endoluminal masslike   lesion.       Upper Abdomen: Limited images of the upper abdomen are unremarkable.       Soft Tissues/Bones: Multilevel degenerative changes in the visualized spine.    Chronic appearing mild multilevel vertebral body height loss in the thoracic   spine.  Diffusely heterogeneous marrow.  Round 2.5 cm nodule in the   subcutaneous soft tissues posterior to the left nipple demonstrates simple   fluid attenuation and may represent a benign cyst.           Impression   1.  Suboptimal evaluation of the subsegmental and more peripheral pulmonary   arteries due to motion artifact.  Given this, no obvious acute pulmonary   thromboembolic disease.  Prominent main pulmonary artery suggests pulmonary   hypertension.       2.  Multifocal bilateral heterogeneous pulmonary opacities.  Findings may   represent a combination of atelectasis and pulmonary edema.  Multifocal   pneumonia is an additional consideration.       3.  Small bilateral pleural effusions with associated lower lobe relaxation   atelectasis.       4.  Round 2.5 cm nodule in the subcutaneous soft tissues posterior to the   left nipple demonstrates simple fluid attenuation and may represent a benign   cyst.  This could be further evaluated with focused ultrasound as warranted. Medical Decision [id number]-[id number]:       Medical Decision Making-Other:     Note:  Labs, medications, radiologic studies were reviewed with personal review of films  Large amounts of data were reviewed  Discussed with nursing Staff, Discharge planner  Infection Control and Prevention measures reviewed  All prior entries were reviewed  Administer medications as ordered  Prognosis: Shira Rios  Discharge planning reviewed  Follow up as outpatient. Thank you for allowing us to participate in the care of this patient. Please call with questions. Aric Lieberman MD, PGY-1  Infectious Disease/ Internal Medicine Resident  [place], [place]    ATTESTATION:    I have discussed the case, including pertinent history and exam findings with the residents and students. I have seen and examined the patient and the key elements of the encounter have been performed by me. I was present when the student obtained his information or examined the patient. I have reviewed the laboratory data, other diagnostic studies and discussed them with the residents.  I have updated the medical record where necessary. I agree with the assessment, plan and orders as documented by the resident/ student.     Janette Alonso MD.      Pager: [phone] - Office: [phone]

## 2021-10-27 LAB
ALBUMIN SERPL-MCNC: 3.1 G/DL (ref 3.5–5.2)
ANION GAP SERPL CALCULATED.3IONS-SCNC: 17 MMOL/L (ref 9–17)
BUN BLDV-MCNC: 15 MG/DL (ref 6–20)
BUN/CREAT BLD: ABNORMAL (ref 9–20)
CALCIUM SERPL-MCNC: 8.8 MG/DL (ref 8.6–10.4)
CHLORIDE BLD-SCNC: 97 MMOL/L (ref 98–107)
CO2: 26 MMOL/L (ref 20–31)
CREAT SERPL-MCNC: 1.49 MG/DL (ref 0.7–1.2)
CULTURE: NORMAL
CULTURE: NORMAL
GFR AFRICAN AMERICAN: 59 ML/MIN
GFR NON-AFRICAN AMERICAN: 49 ML/MIN
GFR SERPL CREATININE-BSD FRML MDRD: ABNORMAL ML/MIN/{1.73_M2}
GFR SERPL CREATININE-BSD FRML MDRD: ABNORMAL ML/MIN/{1.73_M2}
GLUCOSE BLD-MCNC: 113 MG/DL (ref 75–110)
GLUCOSE BLD-MCNC: 114 MG/DL (ref 75–110)
GLUCOSE BLD-MCNC: 140 MG/DL (ref 75–110)
GLUCOSE BLD-MCNC: 145 MG/DL (ref 75–110)
GLUCOSE BLD-MCNC: 152 MG/DL (ref 70–99)
HCT VFR BLD CALC: 32.5 % (ref 40.7–50.3)
HEMOGLOBIN: 9.8 G/DL (ref 13–17)
Lab: NORMAL
Lab: NORMAL
MAGNESIUM: 2.2 MG/DL (ref 1.6–2.6)
MCH RBC QN AUTO: 25.5 PG (ref 25.2–33.5)
MCHC RBC AUTO-ENTMCNC: 30.2 G/DL (ref 28.4–34.8)
MCV RBC AUTO: 84.4 FL (ref 82.6–102.9)
NRBC AUTOMATED: 0 PER 100 WBC
PDW BLD-RTO: 15.5 % (ref 11.8–14.4)
PHOSPHORUS: 4.1 MG/DL (ref 2.5–4.5)
PLATELET # BLD: 203 K/UL (ref 138–453)
PMV BLD AUTO: 11.2 FL (ref 8.1–13.5)
POTASSIUM SERPL-SCNC: 3.7 MMOL/L (ref 3.7–5.3)
RBC # BLD: 3.85 M/UL (ref 4.21–5.77)
SODIUM BLD-SCNC: 140 MMOL/L (ref 135–144)
SPECIMEN DESCRIPTION: NORMAL
SPECIMEN DESCRIPTION: NORMAL
WBC # BLD: 9.5 K/UL (ref 3.5–11.3)

## 2021-10-27 PROCEDURE — 97530 THERAPEUTIC ACTIVITIES: CPT

## 2021-10-27 PROCEDURE — 6370000000 HC RX 637 (ALT 250 FOR IP): Performed by: NURSE PRACTITIONER

## 2021-10-27 PROCEDURE — 94660 CPAP INITIATION&MGMT: CPT

## 2021-10-27 PROCEDURE — 2580000003 HC RX 258: Performed by: PHYSICIAN ASSISTANT

## 2021-10-27 PROCEDURE — 6360000002 HC RX W HCPCS: Performed by: INTERNAL MEDICINE

## 2021-10-27 PROCEDURE — 2700000000 HC OXYGEN THERAPY PER DAY

## 2021-10-27 PROCEDURE — 36415 COLL VENOUS BLD VENIPUNCTURE: CPT

## 2021-10-27 PROCEDURE — 97535 SELF CARE MNGMENT TRAINING: CPT

## 2021-10-27 PROCEDURE — 82947 ASSAY GLUCOSE BLOOD QUANT: CPT

## 2021-10-27 PROCEDURE — 94761 N-INVAS EAR/PLS OXIMETRY MLT: CPT

## 2021-10-27 PROCEDURE — 99232 SBSQ HOSP IP/OBS MODERATE 35: CPT | Performed by: INTERNAL MEDICINE

## 2021-10-27 PROCEDURE — 85027 COMPLETE CBC AUTOMATED: CPT

## 2021-10-27 PROCEDURE — 2060000000 HC ICU INTERMEDIATE R&B

## 2021-10-27 PROCEDURE — 80069 RENAL FUNCTION PANEL: CPT

## 2021-10-27 PROCEDURE — 99232 SBSQ HOSP IP/OBS MODERATE 35: CPT | Performed by: PHYSICIAN ASSISTANT

## 2021-10-27 PROCEDURE — 6370000000 HC RX 637 (ALT 250 FOR IP): Performed by: INTERNAL MEDICINE

## 2021-10-27 PROCEDURE — 6370000000 HC RX 637 (ALT 250 FOR IP): Performed by: PHYSICIAN ASSISTANT

## 2021-10-27 PROCEDURE — 6370000000 HC RX 637 (ALT 250 FOR IP): Performed by: STUDENT IN AN ORGANIZED HEALTH CARE EDUCATION/TRAINING PROGRAM

## 2021-10-27 PROCEDURE — 83735 ASSAY OF MAGNESIUM: CPT

## 2021-10-27 PROCEDURE — 94664 DEMO&/EVAL PT USE INHALER: CPT

## 2021-10-27 PROCEDURE — 2580000003 HC RX 258: Performed by: NURSE PRACTITIONER

## 2021-10-27 PROCEDURE — 6360000002 HC RX W HCPCS: Performed by: NURSE PRACTITIONER

## 2021-10-27 RX ORDER — 0.9 % SODIUM CHLORIDE 0.9 %
500 INTRAVENOUS SOLUTION INTRAVENOUS ONCE
Status: COMPLETED | OUTPATIENT
Start: 2021-10-27 | End: 2021-10-27

## 2021-10-27 RX ORDER — DIPHENHYDRAMINE HCL 25 MG
25 TABLET ORAL EVERY 6 HOURS PRN
Status: DISCONTINUED | OUTPATIENT
Start: 2021-10-27 | End: 2021-10-29 | Stop reason: HOSPADM

## 2021-10-27 RX ORDER — CLONAZEPAM 1 MG/1
1 TABLET ORAL 2 TIMES DAILY
Status: DISCONTINUED | OUTPATIENT
Start: 2021-10-27 | End: 2021-10-28

## 2021-10-27 RX ORDER — OXYCODONE HYDROCHLORIDE AND ACETAMINOPHEN 5; 325 MG/1; MG/1
1.5 TABLET ORAL EVERY 6 HOURS
Status: DISCONTINUED | OUTPATIENT
Start: 2021-10-27 | End: 2021-10-28

## 2021-10-27 RX ORDER — LORAZEPAM 2 MG/ML
0.5 INJECTION INTRAMUSCULAR ONCE
Status: COMPLETED | OUTPATIENT
Start: 2021-10-27 | End: 2021-10-27

## 2021-10-27 RX ADMIN — POTASSIUM BICARBONATE 50 MEQ: 977.5 TABLET, EFFERVESCENT ORAL at 20:46

## 2021-10-27 RX ADMIN — CARVEDILOL 3.12 MG: 3.12 TABLET, FILM COATED ORAL at 16:07

## 2021-10-27 RX ADMIN — PREGABALIN 150 MG: 75 CAPSULE ORAL at 08:59

## 2021-10-27 RX ADMIN — ENOXAPARIN SODIUM 40 MG: 40 INJECTION SUBCUTANEOUS at 20:46

## 2021-10-27 RX ADMIN — QUETIAPINE FUMARATE 100 MG: 100 TABLET ORAL at 20:45

## 2021-10-27 RX ADMIN — HYDROCODONE BITARTRATE AND ACETAMINOPHEN 1 TABLET: 5; 325 TABLET ORAL at 01:50

## 2021-10-27 RX ADMIN — OXYCODONE HYDROCHLORIDE AND ACETAMINOPHEN 1.5 TABLET: 5; 325 TABLET ORAL at 20:45

## 2021-10-27 RX ADMIN — ASPIRIN 81 MG: 81 TABLET, CHEWABLE ORAL at 08:59

## 2021-10-27 RX ADMIN — PANTOPRAZOLE SODIUM 40 MG: 40 TABLET, DELAYED RELEASE ORAL at 08:58

## 2021-10-27 RX ADMIN — SODIUM CHLORIDE 500 ML: 0.9 INJECTION, SOLUTION INTRAVENOUS at 16:09

## 2021-10-27 RX ADMIN — ACETAMINOPHEN 650 MG: 325 TABLET ORAL at 08:59

## 2021-10-27 RX ADMIN — LORAZEPAM 0.5 MG: 2 INJECTION INTRAMUSCULAR; INTRAVENOUS at 06:05

## 2021-10-27 RX ADMIN — OXYBUTYNIN CHLORIDE 5 MG: 5 TABLET ORAL at 20:45

## 2021-10-27 RX ADMIN — TAMSULOSIN HYDROCHLORIDE 0.4 MG: 0.4 CAPSULE ORAL at 08:59

## 2021-10-27 RX ADMIN — SODIUM CHLORIDE, PRESERVATIVE FREE 10 ML: 5 INJECTION INTRAVENOUS at 10:57

## 2021-10-27 RX ADMIN — POTASSIUM BICARBONATE 50 MEQ: 977.5 TABLET, EFFERVESCENT ORAL at 08:59

## 2021-10-27 RX ADMIN — OXYCODONE HYDROCHLORIDE AND ACETAMINOPHEN 1.5 TABLET: 5; 325 TABLET ORAL at 16:04

## 2021-10-27 RX ADMIN — LEVOTHYROXINE SODIUM 175 MCG: 175 TABLET ORAL at 08:59

## 2021-10-27 RX ADMIN — CARVEDILOL 3.12 MG: 3.12 TABLET, FILM COATED ORAL at 08:59

## 2021-10-27 RX ADMIN — PREGABALIN 150 MG: 75 CAPSULE ORAL at 20:45

## 2021-10-27 RX ADMIN — INSULIN LISPRO 1 UNITS: 100 INJECTION, SOLUTION INTRAVENOUS; SUBCUTANEOUS at 09:27

## 2021-10-27 RX ADMIN — SODIUM CHLORIDE, PRESERVATIVE FREE 10 ML: 5 INJECTION INTRAVENOUS at 20:52

## 2021-10-27 RX ADMIN — DULOXETINE HYDROCHLORIDE 60 MG: 30 CAPSULE, DELAYED RELEASE ORAL at 08:59

## 2021-10-27 RX ADMIN — OXYBUTYNIN CHLORIDE 5 MG: 5 TABLET ORAL at 08:59

## 2021-10-27 RX ADMIN — DIPHENHYDRAMINE HCL 25 MG: 25 TABLET ORAL at 21:16

## 2021-10-27 RX ADMIN — CLONAZEPAM 1 MG: 1 TABLET ORAL at 16:05

## 2021-10-27 RX ADMIN — INSULIN LISPRO 1 UNITS: 100 INJECTION, SOLUTION INTRAVENOUS; SUBCUTANEOUS at 12:29

## 2021-10-27 RX ADMIN — FUROSEMIDE 40 MG: 40 TABLET ORAL at 08:58

## 2021-10-27 RX ADMIN — CLONAZEPAM 1 MG: 1 TABLET ORAL at 20:45

## 2021-10-27 RX ADMIN — ENOXAPARIN SODIUM 40 MG: 40 INJECTION SUBCUTANEOUS at 08:59

## 2021-10-27 ASSESSMENT — PAIN SCALES - GENERAL
PAINLEVEL_OUTOF10: 5
PAINLEVEL_OUTOF10: 8
PAINLEVEL_OUTOF10: 7
PAINLEVEL_OUTOF10: 7
PAINLEVEL_OUTOF10: 4
PAINLEVEL_OUTOF10: 7
PAINLEVEL_OUTOF10: 4

## 2021-10-27 NOTE — PROGRESS NOTES
[facility]  Office: [address], DO, Patrice Javier, DO, Pat Maple, DO, Joey Billygurmeet Anderson, DO, Randi Doherty MD, Araseli Stanley MD, Mabel Alaniz MD, Leonidas Khan MD, Jacqui Gay MD, Ean Arreola MD, Lady Lauren MD, John Rubalcava MD, Mary Simons, DO, Lexy Gan, DO, Isaiah Jaramillo MD,  Artur Garcia DO, Jovan Collier MD, Tyler Thorpe MD, Neida Johnson MD, Rosio Villavicencio MD, Bill Ward MD, Nabil Morel MD, Guillermo Jones, [facility], [facility], [facility], Presley Santana, [facility], Butch Lomax, CNS, Africa Skinner, CNP, Zunilda Sánchez, Ángel Ribera, CNP, Sagrario Butler, CNP, Chris Hughes, CNP, Tawanda Hummel, CNP, Lu Galvez PA-C, Zaki England, [facility], Herb Fong, CNP, Michelle Hensley, CNP, Maryanne Haro, CNP, Hong Graham, CNP, Donna Guthrie, CNP, Herman Myles, [facility], Michelle Lynn, [address] [place] [address]    Progress Note    10/27/2021    9:51 AM    Name:   Rogelio Fulton  MRN:     [id number]     Acct:      [de-identified]   Room:   [address] [place] [address] Day:  5  Admit Date:  10/21/2021  9:44 PM    PCP:   Magali Palma MD  Code Status:  Full Code    Subjective:     C/C:   Chief Complaint   Patient presents with    Shortness of Breath    Fever     Interval History Status: not changed. Pt seen and evaluated this a.m. He complains that he is anxious and is having trouble expressing himself. He is inquiring whether his medications are correct. He otherwise has no new complaints. Denies fever, chills. Breathing at baseline. Brief History:     From H&P  Kitty Nagel a 61 y.o. Non- / non  male who presents with Shortness of Breath and Fever   and is admitted to the hospital for the management of Pneumonia.   50-year-old male past medical history of urinary tract infection, type 2 diabetes, hyperlipidemia, prior alcohol abuse, depression, hypothyroidism, morbid obesity, bipolar disorder, anxiety, obstructive sleep apnea, venous insufficiency of bilateral lower extremities, history of alcoholic cirrhosis, history of portal hypertension, chronic dependence of supplemental oxygen, diastolic heart failure presents with shortness of breath with concern for pneumonia seen on chest x-ray at outlying facility.  Patient was transported to [address] [place] [address] for further care.  Of note patient was recently discharged from another [facility] due to urinary tract infection and was discharged on cipro.       Review of Systems:     Constitutional:  negative for chills, fevers, sweats  Respiratory:  negative for cough, dyspnea on exertion, shortness of breath, wheezing  Cardiovascular:  negative for chest pain, chest pressure/discomfort, lower extremity edema, palpitations  Gastrointestinal:  negative for abdominal pain, constipation, diarrhea, nausea, vomiting  Neurological:  negative for dizziness, headache    Medications: Allergies:     Allergies   Allergen Reactions    No Known Allergies        Current Meds:   Scheduled Meds:    QUEtiapine  100 mg Oral Nightly    furosemide  40 mg Oral BID    influenza virus vaccine  0.5 mL IntraMUSCular Prior to discharge    potassium bicarbonate  50 mEq Oral BID    losartan  50 mg Oral Daily    oxybutynin  5 mg Oral BID    cloNIDine  1 patch TransDERmal Weekly    carvedilol  3.125 mg Oral BID WC    enoxaparin  40 mg SubCUTAneous BID    sodium chloride flush  5-40 mL IntraVENous 2 times per day    insulin lispro  0-6 Units SubCUTAneous TID WC    insulin lispro  0-3 Units SubCUTAneous Nightly    DULoxetine  60 mg Oral QAM    pregabalin  150 mg Oral BID    aspirin  81 mg Oral Daily    levothyroxine  175 mcg Oral Daily    tamsulosin  0.4 mg Oral Daily    pantoprazole  40 mg Oral QAM AC     Continuous Infusions:    sodium chloride 25 mL (10/26/21 0651)    dextrose       PRN Meds: potassium chloride **OR** potassium alternative oral replacement **OR** potassium chloride, melatonin, albuterol, sodium chloride flush, sodium chloride, ondansetron **OR** ondansetron, magnesium hydroxide, acetaminophen **OR** acetaminophen, glucose, dextrose, glucagon (rDNA), dextrose, albuterol sulfate HFA    Data:     Past Medical History:   has a past medical history of Anxiety and depression, Back pain, chronic, BPH (benign prostatic hyperplasia), CHF (congestive heart failure) ([facility] [address] Cirrhosis ([facility] Diabetes mellitus ([facility] GERD (gastroesophageal reflux disease), H/O cardiac catheterization, Hepatitis, Hiatal hernia, History of alcohol abuse, Hyperlipidemia, Hypertension, IBS (irritable bowel syndrome), Morbid [facility] Neuropathy, On home oxygen therapy, Pancreatitis, Primary osteoarthritis of right knee, Sleep apnea, Thyroid disease, and Urinary bladder neurogenic dysfunction. Social History:   reports that he has never smoked. He has never used smokeless tobacco. He reports that he does not drink alcohol and does not use drugs. Family History:   Family History   Adopted: Yes       Vitals:  BP (!) 84/47   Pulse 70   Temp 99.9 °F (37.7 °C) (Temporal)   Resp 24   Ht 5' 10\" (1.778 m)   Wt (!) 399 lb 14.6 oz (181.4 kg)   SpO2 96%   BMI 57.38 kg/m²   Temp (24hrs), Av.7 °F (37.1 °C), Min:97.8 °F (36.6 °C), Max:99.9 °F (37.7 °C)    Recent Labs     10/26/21  1213 10/26/21  1632 10/26/21  2044 10/27/21  0642   POCGLU 152* 132* 135* 145*       I/O (24Hr):     Intake/Output Summary (Last 24 hours) at 10/27/2021 0951  Last data filed at 10/27/2021 3370  Gross per 24 hour   Intake 1952 ml   Output 2200 ml   Net -248 ml       Labs:  Hematology:  Recent Labs     10/25/21  0813 10/26/21  0426 10/27/21  0623   WBC 12.5* 11.3 9.5   RBC 4.11* 4.01* 3.85*   HGB 10.4* 9.9* 9.8*   HCT 36.4* 34.8* 32.5*   MCV 88.6 86.8 84.4   MCH 25.3 24.7* 25.5   MCHC 28.6 28.4 30.2   RDW 15.4* 15.5* 15.5*    225 203   MPV 10.7 10.6 11.2     Chemistry:  Recent Labs     10/25/21  0813 10/26/21  0426 10/27/21  0623    137 140   K 3.6* 3.0* 3.7   CL 95* 93* 97*   CO2 29 28 26   GLUCOSE 138* 126* 152*   BUN 15 14 15   CREATININE 1.01 0.96 1.49*   MG  --  2.0 2.2   ANIONGAP 19* 16 17   LABGLOM >60 >60 49*   GFRAA >60 >60 59*   CALCIUM 9.1 8.5* 8.8   PHOS  --  2.6 4.1     Recent Labs     10/25/21  1932 10/26/21  0426 10/26/21  0709 10/26/21  1213 10/26/21  1632 10/26/21  2044 10/27/21  0623 10/27/21  0642   LABALBU  --  3.2*  --   --   --   --  3.1*  --    POCGLU 140*  --  128* 152* 132* 135*  --  145*     ABG:  Lab Results   Component Value Date    POCPH 7.442 10/25/2021    PHART 7.330 06/18/2014    POCPCO2 51.6 10/25/2021    SYW9SGM 68.0 06/18/2014    POCPO2 78.2 10/25/2021    PO2ART 84.0 06/18/2014    POCHCO3 35.2 10/25/2021    KED2EOH 34.9 06/18/2014    NBEA NOT REPORTED 10/25/2021    PBEA 10 10/25/2021    UZT3JSH NOT REPORTED 10/25/2021    DRTV8JTM 96 10/25/2021    L0DAZRKB 96.5 06/18/2014    FIO2 4.0 10/25/2021     Lab Results   Component Value Date/Time    SPECIAL NOT REPORTED 10/21/2021 10:37 PM     Lab Results   Component Value Date/Time    CULTURE NO GROWTH 10/21/2021 10:37 PM       Radiology:  XR CHEST (SINGLE VIEW FRONTAL)    Result Date: 10/24/2021  Stable cardiomegaly with low lung volumes and small bilateral pleural effusions. Patchy bilateral airspace disease is not significantly changed from prior. CT HEAD WO CONTRAST    Result Date: 10/22/2021  No acute intracranial abnormality. MRI may be obtained if clinically indicated. CT ABDOMEN PELVIS W IV CONTRAST Additional Contrast? None    Result Date: 10/22/2021  Nonspecific presacral stranding. Otherwise no evidence of acute infectious or inflammatory process in the abdomen or pelvis. Cirrhosis with splenomegaly. XR CHEST PORTABLE    Result Date: 10/21/2021  1. Cardiomegaly 2. Interval worsening of the interstitial alveolar opacities bilaterally, differential considerations include worsening edema versus infection 3. Small right pleural effusion     CT CHEST PULMONARY EMBOLISM W CONTRAST    Result Date: 10/22/2021  1. Suboptimal evaluation of the subsegmental and more peripheral pulmonary arteries due to motion artifact. Given this, no obvious acute pulmonary thromboembolic disease. Prominent main pulmonary artery suggests pulmonary hypertension. 2.  Multifocal bilateral heterogeneous pulmonary opacities. Findings may represent a combination of atelectasis and pulmonary edema. Multifocal pneumonia is an additional consideration. 3.  Small bilateral pleural effusions with associated lower lobe relaxation atelectasis. 4.  Round 2.5 cm nodule in the subcutaneous soft tissues posterior to the left nipple demonstrates simple fluid attenuation and may represent a benign cyst.  This could be further evaluated with focused ultrasound as warranted.        Physical Examination:        General appearance:  alert, cooperative and no distress  Mental Status:  oriented to person, place and time and normal affect  Lungs:  clear to auscultation bilaterally, normal effort  Heart:  regular rate and rhythm, no murmur  Abdomen:  soft, nontender, nondistended, normal bowel sounds, no masses, hepatomegaly, splenomegaly  Extremities:  no edema, redness, tenderness in the calves  Skin:  no gross lesions, rashes, induration  Psych: anxious, restless    Assessment:        Hospital Problems         Last Modified POA    * (Principal) Acute on chronic diastolic congestive heart failure ([address] 10/24/2021 Yes    Overview Signed 10/14/2017  7:34 PM by [facility]     Updating Deprecated Diagnoses         Alcoholic cirrhosis of liver ([address] sober since 2013 10/22/2021 Yes    Bipolar disorder ([address] 10/22/2021 Yes    Type 2 diabetes mellitus with diabetic neuropathy, with long-term current use of insulin ([address] 10/22/2021 Yes    FABI (obstructive sleep apnea) 10/22/2021 Yes    Primary osteoarthritis of right knee (Chronic) 10/22/2021 Yes    Type 2 diabetes mellitus with diabetic neuropathy ([facility] [address] [facility] (Chronic) 10/22/2021 Yes    Acquired hypothyroidism 10/22/2021 Yes    Urine retention 10/22/2021 Yes    Portal hypertension ([address] (Chronic) 10/22/2021 Yes    Venous stasis dermatitis of both lower extremities (Chronic) 10/22/2021 Yes    Chronic indwelling Clemons catheter (Chronic) 10/22/2021 Yes    Anxiety and depression 10/22/2021 Yes    BPH (benign prostatic hyperplasia) 10/22/2021 Yes    Morbid obesity ([address] 10/22/2021 Yes    On home oxygen therapy 10/22/2021 Yes    Overview Signed 11/4/2020  2:08 PM by SUNSHINE Hyde - GIL[facility]ERT     prn         Pneumonia 10/24/2021 Yes          Plan:        Acute respiratory failure with hypoxia-improving -likely pulmonary edema, Lasix 40 mg IV last few days, diuresed approximately 10 L since admission. Pt has developed LEONARDA. Likely from over diuresis. Will hold lasix. 500 ml bolus. LEONARDA - from over diuresis. Hold lasix, losartan. 500 mL bolus. Monitor creatinine daily. Hallucinationscould be from underlying Biopolar. Will have psych see patient. Essential hypertensionpatient is on Catapres patch, Coreg, Cozaar. No changes to blood pressure at this time as blood pressure measurements seem to be unreliable. Patient has had recorded blood pressures of systolic 711. Hypothyroidismcontinue Synthroid  Urinary retention f/u outpatient, chronic urinary retention, continue Flomax did prevent.   Urology follow-up  Type 2 diabetescontinue sliding scale and POC glucose  Super morbid obesity  FABI  Bipolar disorder    Fabián Will PA-C  10/27/2021  9:51 AM

## 2021-10-27 NOTE — PROGRESS NOTES
PATIENT REFUSES TO WEAR BIPAP     [x] Risks and benefits explained to patient   [x] Patient refuses to wear Bipap stating \"I have one at home but I rip it off all the time because I feel enclosed\". The one here he stated he felt like he as drowning. [x] Patient verbalizes understanding of information presented.

## 2021-10-27 NOTE — PLAN OF CARE
Problem: Pain:  Goal: Pain level will decrease  Description: Pain level will decrease  10/27/2021 1815 by Tracee Pappas RN  Outcome: Ongoing  10/27/2021 1815 by Tracee Pappas RN  Outcome: Ongoing  Goal: Control of acute pain  Description: Control of acute pain  10/27/2021 1815 by Tracee Pappas RN  Outcome: Ongoing  10/27/2021 1815 by Tracee Pappas RN  Outcome: Ongoing  Goal: Control of chronic pain  Description: Control of chronic pain  10/27/2021 1815 by Tracee Pappas RN  Outcome: Ongoing  10/27/2021 1815 by Tracee Pappas RN  Outcome: Ongoing     Problem: Falls - Risk of:  Goal: Will remain free from falls  Description: Will remain free from falls  10/27/2021 1815 by Tracee Pappas RN  Outcome: Ongoing  10/27/2021 1815 by Tracee Pappas RN  Outcome: Ongoing  Goal: Absence of physical injury  Description: Absence of physical injury  10/27/2021 1815 by Tracee Pappas RN  Outcome: Ongoing  10/27/2021 1815 by Tracee Pappas RN  Outcome: Ongoing     Problem: Skin Integrity:  Goal: Will show no infection signs and symptoms  Description: Will show no infection signs and symptoms  10/27/2021 1815 by Tracee Pappas RN  Outcome: Ongoing  10/27/2021 1815 by Tracee Pappas RN  Outcome: Ongoing  Goal: Absence of new skin breakdown  Description: Absence of new skin breakdown  10/27/2021 1815 by Tracee Pappas RN  Outcome: Ongoing  10/27/2021 1815 by Tracee Pappas RN  Outcome: Ongoing

## 2021-10-27 NOTE — PROGRESS NOTES
PULMONARY & CRITICAL CARE MEDICINE PROGRESS NOTE     Patient:  Percy Garcia  MRN: [id number]  Admit date: 10/21/2021  Primary Care Physician: Tyron Motley MD  Consulting Physician: Vilma Corona DO    HISTORY     CHIEF COMPLAINT/REASON FOR CONSULT: SOB    HISTORY OF PRESENT ILLNESS:  The patient is a 61 y.o. male care with complaint of shortness of breath and fever. Patient transferred from [facility] after treating with antibiotics for pneumonia. Poor historian, delayed responses. However alert oriented x3. Stated he has been sick for the past few days, shortness of breath, low-grade fevers. Denies any sick contacts. Lives with his son. Uses wheelchair for ambulation, able to take care of himself. Patient received [employer] code vaccine. Not received flu shot. Denies any smoking history. Never diagnosed with any COPD. Uses 34L oxygen at home. Not using any CPAP at home. Denies chest pain, urinary symptoms, nausea, vomiting, headache. Significant history of T2DM, hyperlipidemia, recent UTI, alcohol abuse, depression, hypothyroidism, morbid obesity, FABI, bilateral lower extremity venous insufficiency, alcohol cirrhosis, hypertension, diastolic heart failure on oxygen. CT PE negative for PE, however suboptimal evaluation. Showing pulmonary hypertension, multifocal opacities concerning for atelectasis/pneumonia. CT abdomen showing liver cirrhosis with splenomegaly. Echo with severe LVH in 2014. CT PE concerning for pulmonary hypertension. Morbid obesity. History of MDRO. INTERVAL HISTORY:  Pt seen and examined  Vitals stable, using Bipap  Still anxious, confused but good remote memory  Still having hallucinations.   Working on discharge by primary team    PAST MEDICAL HISTORY:        Diagnosis Date    Anxiety and depression     Back pain, chronic     BPH (benign prostatic hyperplasia)     [facility]     Cirrhosis [facility]     Diabetes mellitus ([facility]     not checking bloodsugar at home    GERD (gastroesophageal reflux disease)     H/O cardiac catheterization not sure of date    no stents    Hepatitis     Pt does not know the type.  Hiatal hernia     History of alcohol abuse     Sober since 2013    Hyperlipidemia     not taking any medication    Hypertension     IBS (irritable bowel syndrome)     Morbid obesity ([address]     Neuropathy     feet,toes    On home oxygen therapy     DME for home oxgygen at 2.5 lpm at HS and as needed    Pancreatitis     x 5 episodes    Primary osteoarthritis of right knee     Sleep apnea     suspected juany, never has had PSG study.   HAS NO MACHINE    Thyroid disease     Urinary bladder neurogenic dysfunction      PAST SURGICAL HISTORY:        Procedure Laterality Date    ABDOMEN SURGERY      hernia repair x4 (ventral)    COLONOSCOPY      2012    CYSTOSCOPY  01/24/2018    CYSTOSCOPY  02/20/2019    CYSTOSCOPY N/A 2/20/2019    CYSTOSCOPY DILATION performed by Campos Paniagua MD at [address] [place] [address] 8/23/2019    CYSTOSCOPY/ DILATION NICOLE CATHETER EXCHANGE performed by Campos Paniagua MD at [address], COLON, DIAGNOSTIC     [address]  05/20/2018    repair and reduction of recurrent incarcerated incisional hernia with mesh    [place] CYSTOURETHROSCOPY N/A 1/24/2018    CYSTOSCOPY Morrie Schwalbe performed by Campos Paniagua MD at [address] Bilateral 8/22/2017    FOOT [address] with bone bx performed by Severino Cooper DPM at [address] EGD TRANSORAL BIOPSY SINGLE/MULTIPLE N/A 7/19/2017    EGD BIOPSY performed by Odette Weber MD at [address]  07/19/2017    polypectomy    UPPER GASTROINTESTINAL ENDOSCOPY N/A 3/14/2019    EGD BIOPSY performed by Roxanna Lord MD at [address] [facility] N/A 5/20/2018    REPAIR AND REDUCTION OF RECURRENT INCARCERATED INCISIONAL HERNIA WITH MESH performed by Eulalio Rocha MD at [facility]:       Adopted: Yes     SOCIAL HISTORY:   TOBACCO:   reports that he has never smoked. He has never used smokeless tobacco.  ETOH:  reports no history of alcohol use. DRUGS: reports no history of drug use. AVOCATION/OCCUPATIONAL EXPOSURE:    The patient denies asbestos, silica dust, coal, foundry, quarry or [employer] exposure. The patient denies  to having pet dogs, cats, turtles or exotic birds at home. There is no history of TB or TB exposure. There is no exposure to sick contacts. Travel history is not significant history of risk factors for pulmonary disease. The patient denies using [employer]. ALLERGIES:    Allergies   Allergen Reactions    No Known Allergies          HOME MEDICATIONS:  Prior to Admission medications    Medication Sig Start Date End Date Taking? Authorizing Provider   ciprofloxacin (CIPRO) 500 MG tablet Take 1 tablet by mouth every 12 hours for 10 days 10/18/21 10/28/21  Sara Samayoa DO   insulin glargine (BASAGLAR KWIKPEN) 100 UNIT/ML injection pen Inject 76 Units into the skin 2 times daily    Historical Provider, MD   pregabalin (LYRICA) 150 MG capsule Take 150 mg by mouth 2 times daily.     Historical Provider, MD   ibuprofen (ADVIL;MOTRIN) 600 MG tablet Take 600 mg by mouth every 6 hours as needed for Pain    Historical Provider, MD   SITagliptin (JANUVIA) 100 MG tablet Take 100 mg by mouth daily    Historical Provider, MD   glipiZIDE (GLUCOTROL) 10 MG tablet Take 10 mg by mouth 2 times daily (before meals)    Historical Provider, MD   metFORMIN (GLUCOPHAGE) 500 MG tablet Take 500 mg by mouth 2 times daily (with meals)    Historical Provider, MD   nystatin (MYCOSTATIN) 246053 UNIT/GM cream Apply topically 2 times daily as needed (to skin folds)    Historical Provider, MD   clonazePAM (KLONOPIN) 1 MG tablet Take 1 tablet by mouth 2 times daily as needed for Anxiety for up to 3 days. 1/14/21 1/17/21  Lady Membreno MD   oxyCODONE-acetaminophen (PERCOCET) 7.5-325 MG per tablet Take 1 tablet by mouth every 6 hours as needed for Pain. ; Dispense 30 1/5/21   Historical Provider, MD   QUEtiapine (SEROQUEL XR) 50 MG extended release tablet Take 50 mg by mouth nightly    Historical Provider, MD   triamcinolone (KENALOG) 0.025 % cream Apply topically 2 times daily as needed    Historical Provider, MD   diphenoxylate-atropine (LOMOTIL) 2.5-0.025 MG per tablet Take 1 tablet by mouth 4 times daily as needed for Diarrhea. Historical Provider, MD   fluocinonide (LIDEX) 0.05 % external solution Apply topically as needed (scalp itching)    Historical Provider, MD   ketoconazole (NIZORAL) 2 % shampoo Apply topically Twice a Week    Historical Provider, MD   albuterol sulfate HFA (PROAIR HFA) 108 (90 Base) MCG/ACT inhaler Inhale 2 puffs into the lungs every 6 hours as needed for Wheezing    Historical Provider, MD   tamsulosin (FLOMAX) 0.4 MG capsule Take 1 capsule by mouth daily 1/24/18   Keegan Burnett MD   oxybutynin (DITROPAN) 5 MG tablet Take 1 tablet by mouth 2 times daily 12/12/17   Sourav Samayoa DO   bumetanide (BUMEX) 2 MG tablet Take 2 mg by mouth 2 times daily    Historical Provider, MD   aspirin 81 MG EC tablet Take 1 tablet by mouth daily 7/26/17   Josselin Barr DO   nadolol (CORGARD) 40 MG tablet Take 40 mg by mouth daily    Historical Provider, MD   levothyroxine (SYNTHROID) 175 MCG tablet Take 175 mcg by mouth Daily     Historical Provider, MD   nystatin (MYCOSTATIN) 972846 UNIT/GM powder Apply topically 2 times daily as needed TO ABDOMINAL FOLDS, GROIN     Historical Provider, MD   esomeprazole (NEXIUM) 40 MG capsule Take 40 mg by mouth 2 times daily     Historical Provider, MD   DULoxetine (CYMBALTA) 60 MG capsule Take 60 mg by mouth every morning     Historical Provider, MD   venlafaxine (EFFEXOR-XR) 150 MG XR capsule Take 150 mg by mouth daily.     Historical Provider, MD     IMMUNIZATIONS:  There is no immunization history for the selected administration types on file for this patient. REVIEW OF SYSTEMS:  Unable to obtain due to drowsiness    PHYSICAL EXAMINATION     VITAL SIGNS:   LAST-  BP (!) 98/57   Pulse 69   Temp 97.9 °F (36.6 °C) (Oral)   Resp 19   Ht 5' 10\" (1.778 m)   Wt (!) 399 lb 14.6 oz (181.4 kg)   SpO2 96%   BMI 57.38 kg/m²   8-24 HR RANGE-  TEMP Temp  Av.7 °F (37.1 °C)  Min: 97.9 °F (36.6 °C)  Max: 99.9 °F (87.4 °C)   BP Systolic (40SCS), GMP:470 , Min:98 , XCY:302      Diastolic (44EGY), EOB:96, Min:45, Max:84     PULSE Pulse  Av.4  Min: 69  Max: 80   RR Resp  Av  Min: 19  Max: 19   O2 SAT SpO2  Av %  Min: 96 %  Max: 100 %   OXYGEN DELIVERY No data recorded     SYSTEMIC EXAMINATION:   General appearance -lethargy improved, more alert today, morbidly obese   Mental status oriented x2, restlessness improved, hallucinations present, remote memory preserved  Eyes - pupils equal and reactive, extraocular eye movements intact, sclera anicteric  Ears - not examined  Nose - normal and patent, no erythema, discharge  Mouth - mucous membranes moist, pharynx normal without lesions  Neck - supple, no significant adenopathy  Chest -limited exam due to body habitus. Decreased breath sounds throughout. No wheezing, no rales. Heart - normal rate, regular rhythm, normal S1, S2, no murmurs, rubs, clicks or gallops  Abdomen - soft, nontender, nondistended, no masses or organomegaly  Neurological - motor and sensory grossly normal bilaterally  Musculoskeletal - no joint tenderness, deformity or swelling  Extremities - peripheral pulses normal, pitting and nonpitting edema, dermatitis changes on lower extremity.       DATA REVIEW     Medications: Current Inpatient  Scheduled Meds:   sodium chloride  500 mL IntraVENous Once    clonazePAM  1 mg Oral BID    oxyCODONE-acetaminophen  1.5 tablet Oral Q6H    QUEtiapine  100 mg Oral Nightly    [Held by provider] furosemide  40 mg Oral BID    influenza virus vaccine  0.5 mL IntraMUSCular Prior to discharge    potassium bicarbonate  50 mEq Oral BID    [Held by provider] losartan  50 mg Oral Daily    oxybutynin  5 mg Oral BID    cloNIDine  1 patch TransDERmal Weekly    carvedilol  3.125 mg Oral BID WC    enoxaparin  40 mg SubCUTAneous BID    sodium chloride flush  5-40 mL IntraVENous 2 times per day    insulin lispro  0-6 Units SubCUTAneous TID WC    insulin lispro  0-3 Units SubCUTAneous Nightly    DULoxetine  60 mg Oral QAM    pregabalin  150 mg Oral BID    aspirin  81 mg Oral Daily    levothyroxine  175 mcg Oral Daily    tamsulosin  0.4 mg Oral Daily    pantoprazole  40 mg Oral QAM AC     Continuous Infusions:   sodium chloride 25 mL (10/26/21 0651)    dextrose       INPUT/OUTPUT:  In: 1952 [P.O.:670; I.V.:1282]  Out: 2200 [Urine:2200]    LABS:-  ABGs:   No results found for: PH, PCO2, PO2, HCO3, O2SAT  No results for input(s): PHART, PO2ART, QVA3QAN, KNZ1YMA, BEART, F4NDHDOE in the last 72 hours. Lab Results   Component Value Date    POCPH 7.442 10/25/2021    POCPCO2 51.6 (H) 10/25/2021    POCPO2 78.2 (L) 10/25/2021    POCHCO3 35.2 (H) 10/25/2021    NGTC3KUH 96 10/25/2021     CBC:   Recent Labs     10/25/21  0813 10/26/21  0426 10/27/21  0623   WBC 12.5* 11.3 9.5   HGB 10.4* 9.9* 9.8*   HCT 36.4* 34.8* 32.5*   MCV 88.6 86.8 84.4    225 203   RBC 4.11* 4.01* 3.85*   MCH 25.3 24.7* 25.5   MCHC 28.6 28.4 30.2   RDW 15.4* 15.5* 15.5*     BMP:   Recent Labs     10/25/21  0813 10/26/21  0426 10/27/21  0623    137 140   K 3.6* 3.0* 3.7   CL 95* 93* 97*   CO2 29 28 26   BUN 15 14 15   CREATININE 1.01 0.96 1.49*   GLUCOSE 138* 126* 152*   PHOS  --  2.6 4.1     Liver Function Test:   Recent Labs     10/27/21  0623   LABALBU 3.1*     Amylase/Lipase:  No results for input(s): AMYLASE, LIPASE in the last 72 hours.   Coagulation Profile:   No results for input(s): INR, PROTIME, APTT in the last 72 hours. Cardiac Enzymes:  No results for input(s): CKTOTAL, CKMB, CKMBINDEX, TROPONINI in the last 72 hours. Lactic Acid:  Lab Results   Component Value Date    LACTA 2.0 11/06/2020    LACTA 1.9 11/04/2020    LACTA 0.8 12/07/2017     BNP:   Lab Results   Component Value Date    BNP NOT REPORTED 06/18/2014    BNP NOT REPORTED 06/17/2014    BNP 8 07/07/2012     D-Dimer:  Lab Results   Component Value Date    DDIMER 2.49 05/04/2016     Others:   Lab Results   Component Value Date    TSH 16.83 (H) 10/21/2021    L4DQGDU 5.7 06/22/2014     Lab Results   Component Value Date    MARISA NEGATIVE 06/19/2014    SEDRATE 77 (H) 11/06/2020    CRP 42.3 (H) 11/06/2020     No results found for: Ruma Carisa  Lab Results   Component Value Date    IRON 29 (L) 05/07/2016    TIBC 291 05/07/2016    FERRITIN 109 05/07/2016     No results found for: SPEP, UPEP  No results found for: PSA, CEA, , ET6791,   Microbiology:    Pathology:    Radiology Reports:  XR CHEST (SINGLE VIEW FRONTAL)   Final Result   Stable cardiomegaly with low lung volumes and small bilateral pleural   effusions. Patchy bilateral airspace disease is not significantly changed   from prior. CT HEAD WO CONTRAST   Final Result   No acute intracranial abnormality. MRI may be obtained if clinically   indicated. CT CHEST PULMONARY EMBOLISM W CONTRAST   Final Result   1. Suboptimal evaluation of the subsegmental and more peripheral pulmonary   arteries due to motion artifact. Given this, no obvious acute pulmonary   thromboembolic disease. Prominent main pulmonary artery suggests pulmonary   hypertension. 2.  Multifocal bilateral heterogeneous pulmonary opacities. Findings may   represent a combination of atelectasis and pulmonary edema. Multifocal   pneumonia is an additional consideration. 3.  Small bilateral pleural effusions with associated lower lobe relaxation   atelectasis.       4.  Round 2.5 cm nodule in the subcutaneous soft tissues posterior to the   left nipple demonstrates simple fluid attenuation and may represent a benign   cyst.  This could be further evaluated with focused ultrasound as warranted. CT ABDOMEN PELVIS W IV CONTRAST Additional Contrast? None   Final Result   Nonspecific presacral stranding. Otherwise no evidence of acute infectious   or inflammatory process in the abdomen or pelvis. Cirrhosis with splenomegaly. XR CHEST PORTABLE   Final Result   1. Cardiomegaly   2. Interval worsening of the interstitial alveolar opacities bilaterally,   differential considerations include worsening edema versus infection   3. Small right pleural effusion             Pulmonary Function test:    Polysomnogram:    Echocardiogram:   No results found for this or any previous visit. Cardiac Catheterization:   No results found for this or any previous visit. ASSESSMENT AND PLAN     Assessment:  Acute hypoxic respiratory failure  Acute delirium Vs schizophrenia  Obesity hypoventilation on 45 L NC at home. Obstructive sleep apnea  Never smoked, no history of COPD/asthma or any lung disease. morbid obesity  Pulmonary HTN  Multiple comorbidities: T2DM, hyperlipidemia, recent UTI, alcohol abuse, depression, hypothyroidism, bilateral lower extremity venous insufficiency, Venous stasis dermatitis, alcohol cirrhosis, hypertension, diastolic heart failure. Plan:    I personally interviewed/examined the patient; reviewed interval history, interpreted all available radiographic and laboratory data at the time of service. Consider Psychiatry evaluation. Continue BiPAP even day time as tolerated, needs CPAP/BiPAP for discharge as well. Patient on home baseline 4-5 L O2. Keep saturations 92-94%  Echo with preserved EF, no pulmonary HTN.   Continue incentive spirometry, pulmonary toilet, aspiration precautions and bronchodilators  Continue to monitor I/O with a goal of even/negative fluid balance      DVT and stress ulcer prophylaxis  Physical/occupational therapy; increase activity as tolerated. Shannan Logan MD,    Pulmonary and Critical Care Medicine           10/27/2021, 5:32 PM   Attending Physician Statement  I have discussed the care of Debera Members, including pertinent history and exam findings,  with the resident. I have seen and examined the patient and the key elements of all parts of the encounter have been performed by me. I agree with the assessment, plan and orders as documented by the resident with additions . Treatment plan Discussed with nursing staff in detail , all questions answered . Electronically signed by Sushil Willingham MD on   10/27/21 at 5:39 PM EDT    Please note that this chart was generated using voice recognition Dragon dictation software. Although every effort was made to ensure the accuracy of this automated transcription, some errors in transcription may have occurred.

## 2021-10-27 NOTE — CARE COORDINATION
Transitional Planning     3932 pt accepted back [facility] when upon d/c

## 2021-10-27 NOTE — PROGRESS NOTES
Requires cues for some  Cognition Comment: Pt required frequent redirection d/t becoming off topic very easily, provided increased time to process direction. Pt repeats words used by therapist and is easily distracted. Frequently states, \"now I'm confused\". Objective   Bed mobility  Rolling to Left: Maximum assistance  Rolling to Right: Moderate assistance  Supine to Sit: Moderate assistance (for trunk and BLE progression)  Sit to Supine: Maximum assistance (for BLE progression)  Scooting: Maximal assistance  Comment: HOB elevated with use of bed rails and max cueing for correct technique and sequencing. Transfers  Sit to Stand: Unable to assess  Stand to sit: Unable to assess  Comment: unsafe to attempt due to decreased ability to follow commands  Ambulation  Ambulation?: No  Stairs/Curb  Stairs?: No     Balance  Posture: Fair  Sitting - Static: Fair  Sitting - Dynamic: Fair  Comments: Pt sat EOB ~10 minutes with CGA throughout, pt required unilateral UE support at all times to maintain balance  Exercises  Knee Long Arc Quad: x10  Ankle Pumps: x10  Comments: Pt performed minimal LE exercises while seated EOB d/t decreased ability to follow commands.      AM-PAC Score  AM-PAC Inpatient Mobility Raw Score : 9 (10/27/21 1200)  AM-PAC Inpatient T-Scale Score : 30.55 (10/27/21 1200)  Mobility Inpatient CMS 0-100% Score: 81.38 (10/27/21 1200)  Mobility Inpatient CMS G-Code Modifier : CM (10/27/21 1200)          Goals  Short term goals  Time Frame for Short term goals: 14 visits  Short term goal 1: Perform bed mobility with Min A  Short term goal 2: Perform sit to stand with CGA  Short term goal 3: Ambulate 100ft with appropriate AD and CGA  Short term goal 4: Demo Fair dynamic standing balance to decrease risk of falls  Short term goal 5: Participate in 30 minutes of therapy to demo increased endurance    Plan    Plan  Times per week: 5-6x/wk  Current Treatment Recommendations: Strengthening, ROM, Balance Training, Functional Mobility Training, Transfer Training, Gait Training, Stair training, Endurance Training, Home Exercise Program, Safety Education & Training, Patient/Caregiver Education & Training  Safety Devices  Type of devices: Nurse notified, Call light within reach, Left in bed  Restraints  Initially in place: No     Therapy Time   Individual Concurrent Group Co-treatment   Time In 1113         Time Out 1140         Minutes 27         Timed Code Treatment Minutes: 15 Minutes (co-treat with OT)       Layvonne Degree, PTA

## 2021-10-27 NOTE — PROGRESS NOTES
[facility] - Progress Note    Today's Date and Time: 10/27/2021, 3:04 PM    Impression :   Chronic hypoxemic and hypercapnic respiratory failure,  DM2  Morbid Obesity with BMI 57  Essential HTN  Diastolic CHF with elevated ProBNP and Troponins  Multifocal changes and bilateral effusions by CT of chest. Most likely secondary to CHF rather than bacterial pneumonia    Recommendations:   Monitor off antibiotics   Diuresis  02 support  No apparent infection. ID will sign off. Medical Decision Making/Summary/Discussion:10/27/2021       Infection Control Recommendations   [place] Precautions      Antimicrobial Stewardship Recommendations     Discontinuation of therapy  Coordination of Outpatient Care:   Estimated Length of IV antimicrobials: 10-22-21  Patient will need Midline Catheter Insertion: no  Patient will need PICC line Insertion:no  Patient will need: Home IV , [place],  SNF,  LTAC: TBD  Patient will need outpatient wound care:    Chief complaint/reason for consultation:   Multifocal Pneumonia      History of Present Illness:   Zac Bradley is a 61y.o.-year-old  male who was initially admitted on 10/21/2021. Patient seen at the request of     INITIAL HISTORY:    Patient is known to our service. Previously treated by Dr Sandor Tapia during his admission to [facility] on 11/4/2020 because of toxic metabolic encephalopathy and UTI with E coli and Enterococus.      Patient has known medical history of morbid obesity, pancreatitis, urinary retention status post indwelling Clemons catheter placement, CHF, diabetes mellitus type 2, essential hypertension, morbid obesity with BMI of 57. Patient had a urine culture at [facility] in August 2019 that was positive for Enterococcus, E. coli, Pseudomonas. He had a urine culture at [address] [place] [address] in January 2020 with E. coli and Enterococcus.      More recently he had E. coli and Enterococcus in urine on 11-6-20.      Then Klebsiella pneumoniae on 10-14-21 and 10-15-21 in blood and urine. The patient was admitted to [facility] and treated with Cipro for the above infection. He had Clemons exchanged and was discharged to a SNF to complete treatment with Cipro. His CXR on 10-15-21 did not show any infiltrates. Current cultures of blood and urine on 10-21-21 show no growth but patient's CXR shows multifocal bilateral heterogenous opacities, bilateral posterior lobe consolidations and small pleural effusions. Findings interpreted by Radiologist as a combination of pulmonary edema and atelectasis with also the possibility of multifocal pneumonia. My own interpretation is that they are caused by fluid retention and atelectasis and not by bacterial pneumonia. WBC is normal. He is afebrile and not expectorating sputum. ProBNP and troponins are elevated. I would D/C Zosyn as it will add to fluid and Na loads. He has no Hx of MRSA. CURRENT EVALUATION : 10/27/2021    Afebrile  VS stable    Comfortable, pleasant  Clemons in place with clear urine  Appears somewhat confused, anxious  He appears to be having some visual hallucinations. Reports he is looking at TV but sees his wife instead. Denies SOB  On 2 L/min nasal 02  RR 24-->15  02 sat 96-->100      Labs, X rays reviewed: 10/27/2021    BUN: 12-->15-->14  Cr: 0.98-->1.01-->0.96    WBC: 8,8-->12.5-->11.3  Hb:9,5-->10.4-->9.9  Plat: 144-->242-->225    ProBNP 957  CRP 42.3  Troponin HS  57-->59    SARS Co V 2:   1-14-21: negative   10-15-21: negative   10-21-21: negative     MRSA screen: negative  Cultures:  Urine:  10-14-21: Klebsiella  10-21-21: No growth    Blood:  10-14-21: Klebsiella  10-15-21: Klebsiella  10-21-21: No growth     Sputum :    Wound:      Discussed with patient, GERMAIN, IM.    10-22-21:      I have personally reviewed the past medical history, past surgical history, medications, social history, and family history, and I have updated the database accordingly.   Past GASTROINTESTINAL ENDOSCOPY  07/19/2017    polypectomy    UPPER GASTROINTESTINAL ENDOSCOPY N/A 3/14/2019    EGD BIOPSY performed by Rachel Granda MD at [facility] N/A 5/20/2018    REPAIR AND REDUCTION OF RECURRENT INCARCERATED INCISIONAL HERNIA WITH MESH performed by Dennise Smith MD at [facility]       Medications:      sodium chloride  500 mL IntraVENous Once    clonazePAM  1 mg Oral BID    oxyCODONE-acetaminophen  1.5 tablet Oral Q6H    QUEtiapine  100 mg Oral Nightly    [Held by provider] furosemide  40 mg Oral BID    influenza virus vaccine  0.5 mL IntraMUSCular Prior to discharge    potassium bicarbonate  50 mEq Oral BID    [Held by provider] losartan  50 mg Oral Daily    oxybutynin  5 mg Oral BID    cloNIDine  1 patch TransDERmal Weekly    carvedilol  3.125 mg Oral BID WC    enoxaparin  40 mg SubCUTAneous BID    sodium chloride flush  5-40 mL IntraVENous 2 times per day    insulin lispro  0-6 Units SubCUTAneous TID WC    insulin lispro  0-3 Units SubCUTAneous Nightly    DULoxetine  60 mg Oral QAM    pregabalin  150 mg Oral BID    aspirin  81 mg Oral Daily    levothyroxine  175 mcg Oral Daily    tamsulosin  0.4 mg Oral Daily    pantoprazole  40 mg Oral QAM AC       Social History:     Social History     Socioeconomic History    Marital status:      Spouse name: Not on file    Number of children: Not on file    Years of education: Not on file    Highest education level: Not on file   Occupational History    Not on file   Tobacco Use    Smoking status: Never Smoker    Smokeless tobacco: Never Used   Vaping Use    Vaping Use: Never used   Substance and Sexual Activity    Alcohol use: No     Comment: quit drinking 2012    Drug use: No    Sexual activity: Not on file   Other Topics Concern    Not on file   Social History Narrative    Not on file     Social Determinants of Health     Financial Resource Strain:     Difficulty of Paying Living Expenses:    Food Insecurity:     Worried About [facility] in the Last Year:     [address] in the Last Year:    Transportation Needs:     Lack of Transportation (Medical):  Lack of Transportation (Non-Medical):    Physical Activity:     Days of Exercise per Week:     Minutes of Exercise per Session:    Stress:     Feeling of Stress :    Social Connections:     Frequency of Communication with Friends and Family:     Frequency of Social Gatherings with Friends and Family:     Attends Jew Services:     Active Member of Clubs or Organizations:     Attends Club or Organization Meetings:     Marital Status:    Intimate Partner Violence:     Fear of Current or Ex-Partner:     Emotionally Abused:     Physically Abused:     Sexually Abused:        Family History:     Family History   Adopted: Yes        Allergies:   No known allergies     Review of Systems:   Constitutional: No fevers or chills. SOB  Head: No headaches  Eyes: No double vision or blurry vision. No conjunctival inflammation. ENT: No sore throat or runny nose. . No hearing loss, tinnitus or vertigo. Cardiovascular: No chest pain or palpitations. Shortness of breath. GALLEGOS  Lung: Shortness of breath, cough. Minimal sputum production  Abdomen: No nausea, vomiting, diarrhea, or abdominal pain. [place] No cramps. Genitourinary: No increased urinary frequency, or dysuria. No hematuria. No suprapubic or CVA pain  Musculoskeletal: No muscle aches or pains. No joint effusions, swelling or deformities  Hematologic: No bleeding or bruising. Neurologic: No headache, weakness, numbness, or tingling. Integument: No rash, no ulcers. Psychiatric: No depression. Endocrine: No polyuria, no polydipsia, no polyphagia.     Physical Examination :     Patient Vitals for the past 8 hrs:   BP Temp Temp src Pulse Resp SpO2   10/27/21 1206 (!) 101/45 98.4 °F (36.9 °C) Oral 69 19 100 %   10/27/21 0931     24 96 %   10/27/21 0800 115/84 99.9 °F (37.7 °C) Temporal 70 17 100 %     General Appearance: Somnolent, morbidly obese  Head:  Normocephalic, no trauma  Eyes: Pupils equal, round, reactive to light and accommodation; extraocular movements intact; sclera anicteric; conjunctivae pink. No embolic phenomena. ENT: Oropharynx clear, without erythema, exudate, or thrush. No tenderness of sinuses. Mouth/throat: mucosa pink and moist. No lesions. Dentition in good repair. Neck:Supple, without lymphadenopathy. Thyroid normal, No bruits. Pulmonary/Chest: Distant breath sounds, decreased sounds. Dullness at bases  Cardiovascular: Regular rate and rhythm without murmurs, rubs, or gallops. Abdomen: Soft, non tender. Bowel sounds normal. No organomegaly  All four Extremities: No cyanosis, clubbing, edema, or effusions. Neurologic: No gross sensory or motor deficits. Skin: Warm and dry with good turgor. Signs of peripheral arterial and venous insufficiency. Pitting and non pitting edema. Discoloration from venous stasis. Medical Decision Making -Laboratory:   I have independently reviewed/ordered the following labs:    CBC with Differential:   Recent Labs     10/26/21  0426 10/27/21  0623   WBC 11.3 9.5   HGB 9.9* 9.8*   HCT 34.8* 32.5*    203     BMP:   Recent Labs     10/26/21  0426 10/27/21  0623    140   K 3.0* 3.7   CL 93* 97*   CO2 28 26   BUN 14 15   CREATININE 0.96 1.49*   MG 2.0 2.2     Hepatic Function Panel:   Recent Labs     10/26/21  0426 10/27/21  0623   LABALBU 3.2* 3.1*     No results for input(s): RPR in the last 72 hours. No results for input(s): HIV in the last 72 hours. No results for input(s): BC in the last 72 hours.   Lab Results   Component Value Date    MUCUS NOT REPORTED 10/21/2021    RBC 3.85 10/27/2021    RBC 3.93 04/02/2012    TRICHOMONAS NOT REPORTED 10/21/2021    WBC 9.5 10/27/2021    YEAST NOT REPORTED 10/21/2021    TURBIDITY Clear 10/21/2021     Lab Results   Component Value Date    CREATININE 1.49 nodule in the   subcutaneous soft tissues posterior to the left nipple demonstrates simple   fluid attenuation and may represent a benign cyst.           Impression   1.  Suboptimal evaluation of the subsegmental and more peripheral pulmonary   arteries due to motion artifact.  Given this, no obvious acute pulmonary   thromboembolic disease.  Prominent main pulmonary artery suggests pulmonary   hypertension.       2.  Multifocal bilateral heterogeneous pulmonary opacities.  Findings may   represent a combination of atelectasis and pulmonary edema.  Multifocal   pneumonia is an additional consideration.       3.  Small bilateral pleural effusions with associated lower lobe relaxation   atelectasis.       4.  Round 2.5 cm nodule in the subcutaneous soft tissues posterior to the   left nipple demonstrates simple fluid attenuation and may represent a benign   cyst.  This could be further evaluated with focused ultrasound as warranted. Medical Decision [id number]-Tyeccaeu-Lwayj:       Medical Decision Making-Other:     Note:  Labs, medications, radiologic studies were reviewed with personal review of films  Large amounts of data were reviewed  Discussed with nursing Staff, Discharge planner  Infection Control and Prevention measures reviewed  All prior entries were reviewed  Administer medications as ordered  Prognosis: [address]  Discharge planning reviewed  Follow up as outpatient. Thank you for allowing us to participate in the care of this patient. Please call with questions. Chelsy García MD, PGY-1  Infectious Disease/ Internal Medicine Resident  [place], [place]    ATTESTATION:    I have discussed the case, including pertinent history and exam findings with the residents and students. I have seen and examined the patient and the key elements of the encounter have been performed by me. I was present when the student obtained his information or examined the patient.  I have reviewed the laboratory data, other diagnostic studies and discussed them with the residents. I have updated the medical record where necessary. I agree with the assessment, plan and orders as documented by the resident/ student.     Adam Burt MD.      Pager: [phone] - Office: [phone]

## 2021-10-27 NOTE — PROGRESS NOTES
Occupational Therapy  Facility/Department: [address] STEPDOWN  Daily Treatment Note  NAME: La Nena Mckeon  : 1965  MRN: [id number]    Date of Service: 10/27/2021    Discharge Recommendations:  Patient would benefit from continued therapy after discharge       Assessment   Performance deficits / Impairments: Decreased functional mobility ; Decreased endurance;Decreased coordination;Decreased ADL status; Decreased ROM; Decreased balance;Decreased strength;Decreased safe awareness;Decreased cognition;Decreased posture  Prognosis: Good  Patient Education: Pt ed on OT role, proper hand placement for bed mobility, safety awareness, benefits of continued therapy and OOB activity-F return  Barriers to Learning: Cognition  REQUIRES OT FOLLOW UP: Yes  Activity Tolerance  Activity Tolerance: Patient limited by fatigue  Safety Devices  Safety Devices in place: Yes  Type of devices: Bed alarm in place;Call light within reach; Patient at risk for falls; Left in bed;Nurse notified  Restraints  Initially in place: No         Patient Diagnosis(es): The primary encounter diagnosis was Pneumonia of both lungs due to infectious organism, unspecified part of lung. A diagnosis of Hypokalemia was also pertinent to this visit. has a past medical history of Anxiety and depression, Back pain, chronic, BPH (benign prostatic hyperplasia), CHF (congestive heart failure) ([address] Cirrhosis ([address] Diabetes mellitus ([address] GERD (gastroesophageal reflux disease), H/O cardiac catheterization, Hepatitis, Hiatal hernia, History of alcohol abuse, Hyperlipidemia, Hypertension, IBS (irritable bowel syndrome), Morbid obesity ([address] Neuropathy, On home oxygen therapy, Pancreatitis, Primary osteoarthritis of right knee, Sleep apnea, Thyroid disease, and Urinary bladder neurogenic dysfunction. has a past surgical history that includes Colonoscopy;  Abdomen surgery; hernia repair; Endoscopy, colon, diagnostic; Upper gastrointestinal endoscopy (2017); pr egd transoral biopsy single/multiple (N/A, 7/19/2017); pr deep dissec foot infec,1 bursa (Bilateral, 8/22/2017); Cystoscopy (01/24/2018); pr cystourethroscopy (N/A, 1/24/2018); Incisional hernia repair (05/20/2018); ventral hernia repair (N/A, 5/20/2018); Cystocopy (02/20/2019); Cystoscopy (N/A, 2/20/2019); Upper gastrointestinal endoscopy (N/A, 3/14/2019); and Cystoscopy (N/A, 8/23/2019). Restrictions  Restrictions/Precautions  Restrictions/Precautions: Up as Tolerated, Fall Risk  Required Braces or Orthoses?: No  Position Activity Restriction  Other position/activity restrictions: 5L O2 via NC  Subjective   General  Chart Reviewed: Yes  Patient assessed for rehabilitation services?: Yes  Family / Caregiver Present: No  General Comment  Comments: RN mick'barbi OT tx. Pt agreeable w/ evaluation. Vital Signs  Patient Currently in Pain: Denies   Orientation  Orientation  Overall Orientation Status: Impaired  Orientation Level: Disoriented to situation;Oriented to place;Oriented to person;Disoriented to time  Objective    ADL  Grooming: Increased time to complete;Verbal cueing;Setup;Stand by assistance (Pt washed face sitting EOB, req v/c for task initiation)  UE Dressing: Moderate assistance;Setup; Increased time to complete (Mod A to adjust and tie gown sitting EOB d/t decreased initial sitting balance)  LE Dressing: Maximum assistance;Setup;Verbal cueing; Increased time to complete (Max A to don socks in bed d/t decreased trunk flexion, pt req v/c to lift BLE to assist writer)  Additional Comments: Pt declined further ADLs this date, although does not report why        Balance  Sitting Balance: Contact guard assistance (Mod/Min A for initial sitting balance, progressing to CGA sitting EOB, tolerated approx 15 min)  Standing Balance: Unable to assess(comment)  Standing Balance  Comment: TANJA d/t increased fatigue sitting EOB  Bed mobility  Rolling to Left: Maximum assistance  Rolling to Right: Moderate assistance  Supine to Sit: Moderate assistance  Sit to Supine: Maximum assistance  Scooting: Maximal assistance  Comment: Mod A supine to sit for BLE and trunk progression, Max A for sit to supine  Transfers  Sit to stand: Unable to assess  Stand to sit: Unable to assess  Transfer Comments: Pt not appropriate for functional transfers this date d/t increased distress, increased feelings of fearfulness, and decreased sitting tolerance/endurance. Unsafe to attempt this date. Cognition  Overall Cognitive Status: Exceptions  Arousal/Alertness: Delayed responses to stimuli  Following Commands: Follows one step commands with increased time; Follows one step commands consistently  Attention Span: Attends with cues to redirect; Difficulty dividing attention  Memory: Decreased recall of precautions;Decreased recall of biographical Information  Safety Judgement: Decreased awareness of need for assistance;Decreased awareness of need for safety  Problem Solving: Assistance required to generate solutions;Assistance required to correct errors made  Insights: Decreased awareness of deficits  Initiation: Requires cues for some  Sequencing: Requires cues for some  Cognition Comment: Pt required frequent redirection d/t becoming off topic very easily, provided increased time to process direction. Pt repeats words used by therapist and is easily distracted. Frequently states, \"now I'm confused\".      Plan   Plan  Times per week: 3-4X/Wk  Current Treatment Recommendations: Balance Training, Functional Mobility Training, Endurance Training, Strengthening, ROM, Safety Education & Training, Self-Care / ADL, Patient/Caregiver Education & Training  AM-PAC Score        AM-[facility] Inpatient Daily Activity Raw Score: 15 (10/27/21 1511)  AM-PAC Inpatient ADL T-Scale Score : 34.69 (10/27/21 1511)  ADL Inpatient CMS 0-100% Score: 56.46 (10/27/21 1511)  ADL Inpatient CMS G-Code Modifier : CK (10/27/21 1511)    Goals  Short term goals  Time Frame for Short term goals: Pt will, by d/c  Short term goal 1: Demo UB ADLs at [place] A w/ set up and > 2 VC for initiation/attention  Short term goal 2: Demo LB ADLs/toileting at Mod A w/ AE PRN and > 3 VC for initiation/attention  Short term goal 3: Demo bed mobility at Mod X1 to engage in ADLs and > 2 VC for initiation/attention  Short term goal 4: Demo 10+ min of dynamic sitting balance at SBA w/ bilateral hand release to engage in ADLs  Short term goal 5: Demo implemention of EC/WS tech during ADLs w/ >2 VC to promote engagement in ADLs  Short term goal 6: Complete 3-step functional task w/ 0 VC for initiation/attention to promote increased engagement in ADLs  Short term goal 7: Notfiy OTR to update POC as patient progresses       Therapy Time   Individual Concurrent Group Co-treatment   Time In 1113         Time Out 1140         Minutes 27         Timed Code Treatment Minutes: 13 Minutes (14 min co-tx PT)   Pt in bed upon arrival, pleasantly confused but agreeable to tx this date. Pt retired to bed at end of session, call light within reach, RN notified.      MIRIAM Rojas/NILDA

## 2021-10-28 LAB
ALBUMIN SERPL-MCNC: 3 G/DL (ref 3.5–5.2)
ALLEN TEST: ABNORMAL
ALLEN TEST: ABNORMAL
ANION GAP SERPL CALCULATED.3IONS-SCNC: 15 MMOL/L (ref 9–17)
BUN BLDV-MCNC: 23 MG/DL (ref 6–20)
BUN/CREAT BLD: ABNORMAL (ref 9–20)
CALCIUM SERPL-MCNC: 8.7 MG/DL (ref 8.6–10.4)
CARBOXYHEMOGLOBIN: 1.3 % (ref 0–5)
CHLORIDE BLD-SCNC: 98 MMOL/L (ref 98–107)
CO2: 29 MMOL/L (ref 20–31)
CREAT SERPL-MCNC: 2.72 MG/DL (ref 0.7–1.2)
FIO2: 60
FIO2: ABNORMAL
GFR AFRICAN AMERICAN: 29 ML/MIN
GFR NON-AFRICAN AMERICAN: 24 ML/MIN
GFR SERPL CREATININE-BSD FRML MDRD: ABNORMAL ML/MIN/{1.73_M2}
GFR SERPL CREATININE-BSD FRML MDRD: ABNORMAL ML/MIN/{1.73_M2}
GLUCOSE BLD-MCNC: 100 MG/DL (ref 75–110)
GLUCOSE BLD-MCNC: 109 MG/DL (ref 70–99)
GLUCOSE BLD-MCNC: 115 MG/DL (ref 75–110)
GLUCOSE BLD-MCNC: 116 MG/DL (ref 74–100)
GLUCOSE BLD-MCNC: 99 MG/DL (ref 75–110)
HCO3 VENOUS: 31.8 MMOL/L (ref 24–30)
HCT VFR BLD CALC: 31.9 % (ref 40.7–50.3)
HEMOGLOBIN: 9.5 G/DL (ref 13–17)
MAGNESIUM: 2.1 MG/DL (ref 1.6–2.6)
MCH RBC QN AUTO: 25.9 PG (ref 25.2–33.5)
MCHC RBC AUTO-ENTMCNC: 29.8 G/DL (ref 28.4–34.8)
MCV RBC AUTO: 86.9 FL (ref 82.6–102.9)
METHEMOGLOBIN: ABNORMAL % (ref 0–1.5)
MODE: ABNORMAL
MODE: ABNORMAL
NEGATIVE BASE EXCESS, ART: ABNORMAL (ref 0–2)
NEGATIVE BASE EXCESS, VEN: ABNORMAL MMOL/L (ref 0–2)
NOTIFICATION TIME: ABNORMAL
NOTIFICATION: ABNORMAL
NRBC AUTOMATED: 0 PER 100 WBC
O2 DEVICE/FLOW/%: ABNORMAL
O2 DEVICE/FLOW/%: ABNORMAL
O2 SAT, VEN: 83.9 % (ref 60–85)
OXYHEMOGLOBIN: ABNORMAL % (ref 95–98)
PATIENT TEMP: 37
PATIENT TEMP: ABNORMAL
PCO2, VEN, TEMP ADJ: ABNORMAL MMHG (ref 39–55)
PCO2, VEN: 56.2 (ref 39–55)
PDW BLD-RTO: 15.7 % (ref 11.8–14.4)
PEEP/CPAP: ABNORMAL
PH VENOUS: 7.37 (ref 7.32–7.42)
PH, VEN, TEMP ADJ: ABNORMAL (ref 7.32–7.42)
PHOSPHORUS: 5.3 MG/DL (ref 2.5–4.5)
PLATELET # BLD: 148 K/UL (ref 138–453)
PMV BLD AUTO: 11.1 FL (ref 8.1–13.5)
PO2, VEN, TEMP ADJ: ABNORMAL MMHG (ref 30–50)
PO2, VEN: 51.1 (ref 30–50)
POC HCO3: 32 MMOL/L (ref 21–28)
POC HEMATOCRIT: 27 % (ref 41–53)
POC HEMOGLOBIN: 9 G/DL (ref 13.5–17.5)
POC O2 SATURATION: 100 % (ref 94–98)
POC PCO2 TEMP: ABNORMAL MM HG
POC PCO2: 49.9 MM HG (ref 35–48)
POC PH TEMP: ABNORMAL
POC PH: 7.42 (ref 7.35–7.45)
POC PO2 TEMP: ABNORMAL MM HG
POC PO2: 162.3 MM HG (ref 83–108)
POSITIVE BASE EXCESS, ART: 6 (ref 0–3)
POSITIVE BASE EXCESS, VEN: 6.1 MMOL/L (ref 0–2)
POTASSIUM SERPL-SCNC: 3.6 MMOL/L (ref 3.7–5.3)
PSV: ABNORMAL
PT. POSITION: ABNORMAL
RBC # BLD: 3.67 M/UL (ref 4.21–5.77)
RESPIRATORY RATE: ABNORMAL
SAMPLE SITE: ABNORMAL
SAMPLE SITE: ABNORMAL
SET RATE: ABNORMAL
SODIUM BLD-SCNC: 142 MMOL/L (ref 135–144)
TCO2 (CALC), ART: ABNORMAL MMOL/L (ref 22–29)
TEXT FOR RESPIRATORY: ABNORMAL
TOTAL HB: ABNORMAL G/DL (ref 12–16)
TOTAL RATE: ABNORMAL
VT: ABNORMAL
WBC # BLD: 7.5 K/UL (ref 3.5–11.3)

## 2021-10-28 PROCEDURE — 94660 CPAP INITIATION&MGMT: CPT

## 2021-10-28 PROCEDURE — 6370000000 HC RX 637 (ALT 250 FOR IP): Performed by: PHYSICIAN ASSISTANT

## 2021-10-28 PROCEDURE — 85027 COMPLETE CBC AUTOMATED: CPT

## 2021-10-28 PROCEDURE — 6370000000 HC RX 637 (ALT 250 FOR IP): Performed by: STUDENT IN AN ORGANIZED HEALTH CARE EDUCATION/TRAINING PROGRAM

## 2021-10-28 PROCEDURE — 6370000000 HC RX 637 (ALT 250 FOR IP): Performed by: PSYCHIATRY & NEUROLOGY

## 2021-10-28 PROCEDURE — 6360000002 HC RX W HCPCS: Performed by: INTERNAL MEDICINE

## 2021-10-28 PROCEDURE — 2580000003 HC RX 258: Performed by: NURSE PRACTITIONER

## 2021-10-28 PROCEDURE — 80069 RENAL FUNCTION PANEL: CPT

## 2021-10-28 PROCEDURE — 85014 HEMATOCRIT: CPT

## 2021-10-28 PROCEDURE — 36620 INSERTION CATHETER ARTERY: CPT

## 2021-10-28 PROCEDURE — 82803 BLOOD GASES ANY COMBINATION: CPT

## 2021-10-28 PROCEDURE — 99232 SBSQ HOSP IP/OBS MODERATE 35: CPT | Performed by: PHYSICIAN ASSISTANT

## 2021-10-28 PROCEDURE — 36415 COLL VENOUS BLD VENIPUNCTURE: CPT

## 2021-10-28 PROCEDURE — 82947 ASSAY GLUCOSE BLOOD QUANT: CPT

## 2021-10-28 PROCEDURE — 2060000000 HC ICU INTERMEDIATE R&B

## 2021-10-28 PROCEDURE — 90792 PSYCH DIAG EVAL W/MED SRVCS: CPT | Performed by: PSYCHIATRY & NEUROLOGY

## 2021-10-28 PROCEDURE — 03HB33Z INSERTION OF INFUSION DEVICE INTO RIGHT RADIAL ARTERY, PERCUTANEOUS APPROACH: ICD-10-PCS | Performed by: INTERNAL MEDICINE

## 2021-10-28 PROCEDURE — 82805 BLOOD GASES W/O2 SATURATION: CPT

## 2021-10-28 PROCEDURE — 2580000003 HC RX 258: Performed by: PHYSICIAN ASSISTANT

## 2021-10-28 PROCEDURE — 6370000000 HC RX 637 (ALT 250 FOR IP): Performed by: NURSE PRACTITIONER

## 2021-10-28 PROCEDURE — 6370000000 HC RX 637 (ALT 250 FOR IP): Performed by: INTERNAL MEDICINE

## 2021-10-28 PROCEDURE — 99232 SBSQ HOSP IP/OBS MODERATE 35: CPT | Performed by: INTERNAL MEDICINE

## 2021-10-28 PROCEDURE — 83735 ASSAY OF MAGNESIUM: CPT

## 2021-10-28 RX ORDER — OXYCODONE HYDROCHLORIDE AND ACETAMINOPHEN 5; 325 MG/1; MG/1
1.5 TABLET ORAL EVERY 6 HOURS PRN
Status: DISCONTINUED | OUTPATIENT
Start: 2021-10-28 | End: 2021-10-29 | Stop reason: HOSPADM

## 2021-10-28 RX ORDER — CLONAZEPAM 0.5 MG/1
0.5 TABLET ORAL 2 TIMES DAILY
Status: DISCONTINUED | OUTPATIENT
Start: 2021-10-28 | End: 2021-10-29 | Stop reason: HOSPADM

## 2021-10-28 RX ORDER — QUETIAPINE FUMARATE 25 MG/1
50 TABLET, FILM COATED ORAL NIGHTLY
Status: DISCONTINUED | OUTPATIENT
Start: 2021-10-28 | End: 2021-10-29 | Stop reason: HOSPADM

## 2021-10-28 RX ORDER — MIDODRINE HYDROCHLORIDE 5 MG/1
5 TABLET ORAL ONCE
Status: COMPLETED | OUTPATIENT
Start: 2021-10-28 | End: 2021-10-28

## 2021-10-28 RX ORDER — SODIUM CHLORIDE 9 MG/ML
INJECTION, SOLUTION INTRAVENOUS CONTINUOUS
Status: ACTIVE | OUTPATIENT
Start: 2021-10-28 | End: 2021-10-29

## 2021-10-28 RX ORDER — PREGABALIN 100 MG/1
100 CAPSULE ORAL 2 TIMES DAILY
Status: DISCONTINUED | OUTPATIENT
Start: 2021-10-28 | End: 2021-10-29 | Stop reason: HOSPADM

## 2021-10-28 RX ORDER — 0.9 % SODIUM CHLORIDE 0.9 %
500 INTRAVENOUS SOLUTION INTRAVENOUS ONCE
Status: COMPLETED | OUTPATIENT
Start: 2021-10-28 | End: 2021-10-28

## 2021-10-28 RX ADMIN — MIDODRINE HYDROCHLORIDE 5 MG: 5 TABLET ORAL at 00:45

## 2021-10-28 RX ADMIN — CLONAZEPAM 0.5 MG: 0.5 TABLET ORAL at 21:12

## 2021-10-28 RX ADMIN — PREGABALIN 150 MG: 75 CAPSULE ORAL at 09:12

## 2021-10-28 RX ADMIN — PANTOPRAZOLE SODIUM 40 MG: 40 TABLET, DELAYED RELEASE ORAL at 09:12

## 2021-10-28 RX ADMIN — ENOXAPARIN SODIUM 40 MG: 40 INJECTION SUBCUTANEOUS at 21:11

## 2021-10-28 RX ADMIN — DULOXETINE HYDROCHLORIDE 60 MG: 30 CAPSULE, DELAYED RELEASE ORAL at 09:12

## 2021-10-28 RX ADMIN — CLONAZEPAM 0.5 MG: 0.5 TABLET ORAL at 16:01

## 2021-10-28 RX ADMIN — TAMSULOSIN HYDROCHLORIDE 0.4 MG: 0.4 CAPSULE ORAL at 09:11

## 2021-10-28 RX ADMIN — SODIUM CHLORIDE 500 ML: 9 INJECTION, SOLUTION INTRAVENOUS at 00:32

## 2021-10-28 RX ADMIN — ENOXAPARIN SODIUM 40 MG: 40 INJECTION SUBCUTANEOUS at 09:12

## 2021-10-28 RX ADMIN — SODIUM CHLORIDE, PRESERVATIVE FREE 10 ML: 5 INJECTION INTRAVENOUS at 09:12

## 2021-10-28 RX ADMIN — POTASSIUM BICARBONATE 50 MEQ: 977.5 TABLET, EFFERVESCENT ORAL at 09:11

## 2021-10-28 RX ADMIN — QUETIAPINE FUMARATE 50 MG: 100 TABLET ORAL at 21:11

## 2021-10-28 RX ADMIN — DIPHENHYDRAMINE HCL 25 MG: 25 TABLET ORAL at 18:38

## 2021-10-28 RX ADMIN — SODIUM CHLORIDE, PRESERVATIVE FREE 10 ML: 5 INJECTION INTRAVENOUS at 21:11

## 2021-10-28 RX ADMIN — SODIUM CHLORIDE 500 ML: 9 INJECTION, SOLUTION INTRAVENOUS at 03:13

## 2021-10-28 RX ADMIN — OXYBUTYNIN CHLORIDE 5 MG: 5 TABLET ORAL at 21:11

## 2021-10-28 RX ADMIN — SODIUM CHLORIDE: 9 INJECTION, SOLUTION INTRAVENOUS at 08:47

## 2021-10-28 RX ADMIN — OXYCODONE HYDROCHLORIDE AND ACETAMINOPHEN 1.5 TABLET: 5; 325 TABLET ORAL at 16:18

## 2021-10-28 RX ADMIN — OXYBUTYNIN CHLORIDE 5 MG: 5 TABLET ORAL at 09:12

## 2021-10-28 RX ADMIN — ASPIRIN 81 MG: 81 TABLET, CHEWABLE ORAL at 09:12

## 2021-10-28 RX ADMIN — POTASSIUM BICARBONATE 50 MEQ: 977.5 TABLET, EFFERVESCENT ORAL at 21:11

## 2021-10-28 RX ADMIN — LEVOTHYROXINE SODIUM 175 MCG: 175 TABLET ORAL at 09:12

## 2021-10-28 RX ADMIN — PREGABALIN 100 MG: 100 CAPSULE ORAL at 21:11

## 2021-10-28 ASSESSMENT — PAIN SCALES - GENERAL
PAINLEVEL_OUTOF10: 4
PAINLEVEL_OUTOF10: 7
PAINLEVEL_OUTOF10: 0

## 2021-10-28 NOTE — CONSULTS
Department of Psychiatry  Behavioral Health Consult    REASON FOR CONSULT:  Bipolar disorder and hallucinations    CONSULTING PHYSICIAN: Dr. Adry Valentine    History obtained from: Patient and chart    HISTORY OF PRESENT ILLNESS:    The patient is a 61 y.o. male who has past psychiatric diagnosis of bipolar disorder, depression, anxiety was admitted on 10/22/2021 for management of shortness of breath and fever. The patient has a past medical history significant for UTI, type II DM, hyperlipidemia, prior alcohol abuse complicated by liver cirrhosis, hypothyroidism, obesity. I have noted that the patient has had multiple urinary tract infections leading to toxic metabolic encephalopathy in April 2020 at [facility].  The patient was seen using telehealth equipment. Patient knows he is at [facility]. He is able to give an account of his medical history. He is aware of his history of UTIs and his previous admissions. He is able to tell me the date. He is aware of his breathing difficulties. The patient states that his medications have been changed and he has been \"freaking out\". He reports high levels of anxiety. The patient denies any depressive symptoms. He denies any suicidal ideation. The patient denies any auditory hallucinations, paranoia, ideas of reference or other psychotic phenomena. He is distractible. He has loose associations. He is distractible and his concentration varies  He has eye cataracts. Needs surgery for those. The patient admits to visual hallucinations. He did not give me a description of these. The patient reports compliance with his medications. He could not tell me exactly what medications he takes but he states that he takes them regularly. He is helped by his son with whom he lives. The patient admits to having some difficulty in the relationship with his son. The patient's PDMP was reviewed. He has been receiving clonazepam 1 mg tablets BID long-term. These were discontinued upon admission. An abrupt withdrawal might worsen anxiety and cause delirium. The patient is currently receiving care for the above psychiatric illness. Psychiatric Review of Systems        ·    Obsessions and Compulsions: Denies    ·    Suzanna or Hypomania: Denies  ·    Hallucinations: Denies  ·    Panic Attacks:  Denies  ·    Delusions:  Denies  ·    Phobias:  Denies  ·    Trauma: Denies      Substance Abuse History:  ETOH: The patient admits to a history of heavy drinking in the remote past.  He is aware that this is causing problem. He could not give me a timeline  Marijuana: Denies  Opiates: Denies  Other Drugs: Denies      Past Psychiatric History:  The patient denies any previous contact with psychiatry. He states he has never attempted suicide or been admitted to a psychiatric hospital.  From the chart I have noted that the patient has been given a diagnosis of bipolar disorder and depression in the past.  He has been receiving a number of psychotropic medications over the last several months. Personal History: Lives with son. He helps. Past Medical History:        Diagnosis Date    Anxiety and depression     Back pain, chronic     BPH (benign prostatic hyperplasia)     CHF (congestive heart failure) (HCC)     Cirrhosis (HCC)     Diabetes mellitus ([address]     not checking bloodsugar at home    GERD (gastroesophageal reflux disease)     H/O cardiac catheterization not sure of date    no stents    Hepatitis     Pt does not know the type.      Hiatal hernia     History of alcohol abuse     Sober since 2013    Hyperlipidemia     not taking any medication    Hypertension     IBS (irritable bowel syndrome)     Morbid obesity ([address]     Neuropathy     feet,toes    On home oxygen therapy     DME for home oxgygen at 2.5 lpm at HS and as needed    Pancreatitis     x 5 episodes    Primary osteoarthritis of right knee     Sleep apnea     suspected juany, never has had PSG study. HAS NO MACHINE    Thyroid disease     Urinary bladder neurogenic dysfunction        Past Surgical History:        Procedure Laterality Date    ABDOMEN SURGERY      hernia repair x4 (ventral)    COLONOSCOPY      2012    CYSTOSCOPY  01/24/2018    CYSTOSCOPY  02/20/2019    CYSTOSCOPY N/A 2/20/2019    CYSTOSCOPY DILATION performed by Mali Martell MD at [address] 8/23/2019    CYSTOSCOPY/ DILATION NICOLE CATHETER EXCHANGE performed by Mali Martell MD at [address], COLON, DIAGNOSTIC     [address]  05/20/2018    repair and reduction of recurrent incarcerated incisional hernia with mesh    ND CYSTOURETHROSCOPY N/A 1/24/2018    CYSTOSCOPY Shane Sinks performed by Mali Martell MD at [address] Bilateral 8/22/2017    FOOT [address] [facility]note 2[facility] with bone bx performed by Jayla Hahn DPM at [address] [facility] EGD TRANSORAL BIOPSY SINGLE/MULTIPLE N/A 7/19/2017    EGD BIOPSY performed by Familia Vang MD at [address] [facility]  07/19/2017    polypectomy    UPPER GASTROINTESTINAL ENDOSCOPY N/A 3/14/2019    EGD BIOPSY performed by Harrold Aschoff, MD at [address] N/A 5/20/2018    REPAIR AND REDUCTION OF RECURRENT INCARCERATED INCISIONAL HERNIA WITH MESH performed by Lawrence Lyon MD at [address] [facility]         Medications Prior to Admission:   Medications Prior to Admission: ciprofloxacin (CIPRO) 500 MG tablet, Take 1 tablet by mouth every 12 hours for 10 days  insulin glargine (BASAGLAR KWIKPEN) 100 UNIT/ML injection pen, Inject 76 Units into the skin 2 times daily  pregabalin (LYRICA) 150 MG capsule, Take 150 mg by mouth 2 times daily.   ibuprofen (ADVIL;MOTRIN) 600 MG tablet, Take 600 mg by mouth every 6 hours as needed for Pain  SITagliptin (JANUVIA) 100 MG tablet, Take 100 mg by mouth daily  glipiZIDE (GLUCOTROL) 10 MG tablet, Take 10 found for: VALPROATE, CBMZ  No results found for: LITHIUM, VALPROATE    FURTHER LABS ORDERED :      Radiology   ECHO Complete 2D W Doppler W Color    Result Date: 10/25/2021  Transthoracic Echocardiography Report (TTE)  Patient Name [address] [facility]        Date of Study               10/25/2021               GUSTAVO GROVE   Date of      1965  Gender                      Male  Birth   Age          61 year(s)  Race                           Room Number  0429        Height:                     70 inch, 177.8 cm   Corporate ID [id number]    Weight:                     400 pounds, 181.4 kg  #   Patient Acct [de-identified]   BSA:          2.8 m^2       BMI:       57.39  #                                                               kg/m^2   MR #         [id number]     Sonographer                 Jorge Camarena   Accession #  [id number]  Interpreting Physician      [address] [place] [address]   Fellow                   Referring Nurse                           Practitioner   Interpreting             Referring Physician         [facility]  Fellow  Additional Comments Technically difficult study, patient body habitus. Type of Study   TTE procedure:2D Echocardiogram, M-Mode, Doppler, Color Doppler. Procedure Date Date: 10/25/2021 Start: 09:21 AM Study Location: [facility] Technical Quality: Poor visualization Indications:Elevated troponin. History / Tech. Comments: Procedure explained to patient. Echo completed in the Echo Lab. Patient supine and unable to be repositioned. PMHx: Diabetes, Hypertension Patient Status: Inpatient Height: 70 inches Weight: 400.01 pounds BSA: 2.8 m^2 BMI: 57.39 kg/m^2 Allergies   - *No Known Allergies. CONCLUSIONS Summary Left ventricle appears normal in size. Mild to moderately increased left ventricular wall thickness, with asymmetrical thickening of the septum. Left ventricular systolic function appears overall preserved. Estimated EF 50%.  Unable to assess for wall motion abnormalities within the limits of the study. Left atrial dilatation. Aortic valve is difficult to assess within the limitations of the study. Aortic valve right and left coronary cusps are sclerotic and appears to be possible fused, cannot rule out Bicuspid AV. Unable to assess adequately for aortic stenosis, but does appear to be at least mild aortic stenosis. Mitral valve sclerosis without stenosis. Signature ----------------------------------------------------------------------------  Electronically signed by Yudith Kellogg(Sonographer) on 10/25/2021 11:09 AM ---------------------------------------------------------------------------- ----------------------------------------------------------------------------  Electronically signed by Kiran Cox(Interpreting physician) on 10/25/2021  11:28 AM ---------------------------------------------------------------------------- FINDINGS Left Atrium Left atrial dilatation. Left Ventricle Left ventricle appears normal in size. Mild to moderately increased left ventricular wall thickness, with asymmetrical thickening of the septum. Left ventricular systolic function appears overall preserved. Estimated EF 50%. Unable to assess for wall motion abnormalities within the limits of the study. Right Atrium Right atrium was not well visualized. Right Ventricle Right ventricle was not well visualized. Mitral Valve Normal mitral valve structure. Mitral valve sclerosis without stenosis. No evidence of mitral regurgitation. Aortic Valve Aortic valve is difficult to assess within the limitations of the study. Aortic valve right and left coronary cusps are sclerotic and appears to be possible fused, cannot rule out Bicuspid AV. Unable to assess adequately for aortic stenosis, but does appear to be at least mild aortic stenosis. No evidence of aortic insufficiency. Tricuspid Valve Tricuspid valve was not well visualized. No tricuspid regurgitation was seen.  Insignificant tricuspid regurgitation, unable to estimate RVSP. Pulmonic Valve Pulmonic valve not well visualized but Doppler velocities are normal. Pericardial Effusion No significant pericardial effusion is seen. Miscellaneous Normal aortic root dimension. E/E' average = 6.45. IVC not well visualized. M-mode / 2D Measurements & Calculations:   LVIDd:5.9 cm(3.7 - 5.6 cm)      Diastolic JGOJUF:237 ml  CLKJ:3.4 cm(0.6 - 1.1 cm)       Aortic Root:4.1 cm(2.0 - 3.7 cm)  LVPWd:1.2 cm(0.6 - 1.1 cm)      LA Dimension: 4.9 cm(1.9 - 4.0 cm)                                   LVOT:2.6 cm                                  RVDd:4.2 cm   Mitral:                                 Aortic   Valve Area (P1/2-Time): 2.56 cm^2       Peak Velocity: 2.32 m/s  Peak E-Wave: 0.45 m/s                   Mean Velocity: 1.51 m/s  Peak A-Wave: 0.68 m/s                   Peak Gradient: 21.53 mmHg  E/A Ratio: 0.66                         Mean Gradient: 11 mmHg  Peak Gradient: 0.81 mmHg  Mean Gradient: 2 mmHg  Deceleration Time: 303 msec             Area (continuity): 2.97 cm^2  P1/2t: 86 msec                          AV VTI: 41.7 cm   Area (continuity): 4.63 cm^2  Mean Velocity: 0.61 m/s                                           Pulmonic:                                           Peak Velocity: 1.55 m/s                                          Peak Gradient: 9.61 mmHg  Diastology / Tissue Doppler Septal Wall E' velocity:0.07 m/s Septal Wall E/E':6.8 Lateral Wall E' velocity:0.07 m/s Lateral Wall E/E':6.1    XR CHEST (SINGLE VIEW FRONTAL)    Result Date: 10/24/2021  EXAMINATION: ONE XRAY VIEW OF THE CHEST 10/24/2021 9:11 am COMPARISON: 10/21/2021 chest radiograph and CT chest HISTORY: ORDERING SYSTEM PROVIDED HISTORY: hypoxia TECHNOLOGIST PROVIDED HISTORY: hypoxia Reason for Exam: port upright with GRID Acuity: Unknown FINDINGS: The cardiac silhouette is enlarged, unchanged. Low lung volumes. Small bilateral pleural effusions and adjacent atelectasis.   Patchy bilateral airspace disease, not significantly changed from prior. No pneumothorax. No evidence of acute osseous abnormality. Stable cardiomegaly with low lung volumes and small bilateral pleural effusions. Patchy bilateral airspace disease is not significantly changed from prior. CT HEAD WO CONTRAST    Result Date: 10/22/2021  EXAMINATION: CT OF THE HEAD WITHOUT CONTRAST  10/22/2021 12:10 am TECHNIQUE: CT of the head was performed without the administration of intravenous contrast. Dose modulation, iterative reconstruction, and/or weight based adjustment of the mA/kV was utilized to reduce the radiation dose to as low as reasonably achievable. COMPARISON: 11/06/2020 HISTORY: ORDERING SYSTEM PROVIDED HISTORY: AMS TECHNOLOGIST PROVIDED HISTORY: AMS Decision Support Exception - unselect if not a suspected or confirmed emergency medical condition->Emergency Medical Condition (MA) Reason for Exam: AMS Acuity: Unknown Type of Exam: Unknown FINDINGS: BRAIN/VENTRICLES: There is no acute intracranial hemorrhage, mass effect or midline shift. No abnormal extra-axial fluid collection. The gray-white differentiation is maintained without evidence of an acute infarct. There is no evidence of hydrocephalus. ORBITS: The bilateral globes are intact. SINUSES: Mucoperiosteal thickening of the right sphenoid sinus is seen. The left sphenoid sinus is completely opacified. SOFT TISSUES/SKULL:  No acute abnormality of the visualized skull or soft tissues. No acute intracranial abnormality. MRI may be obtained if clinically indicated. CT ABDOMEN PELVIS W IV CONTRAST Additional Contrast? None    Result Date: 10/22/2021  EXAMINATION: CT OF THE ABDOMEN AND PELVIS WITH CONTRAST 10/21/2021 11:37 pm TECHNIQUE: CT of the abdomen and pelvis was performed with the administration of intravenous contrast. Multiplanar reformatted images are provided for review.  Dose modulation, iterative reconstruction, and/or weight based adjustment of the mA/kV was utilized to reduce the radiation dose to as low as reasonably achievable. COMPARISON: 12/18/2019 HISTORY: ORDERING SYSTEM PROVIDED HISTORY: AMS, lower abdominal pain TECHNOLOGIST PROVIDED HISTORY: Please go through testicles AMS, lower abdominal pain Reason for Exam: AMS, lower abdominal pain Acuity: Unknown Type of Exam: Unknown FINDINGS: Lower Chest: Please see separately dictated chest CT for findings the diaphragm. Small bilateral pleural effusions are again noted. A 2.8 cm left pectoral subcutaneous nodules again noted, increased in size compared to 2019 when it measured 2.2 cm. Organs: Cirrhotic configuration of the liver. The spleen is enlarged to 18 cm in craniocaudal dimension. The pancreas, kidneys and adrenal glands are without acute findings. The gallbladder is present without radiopaque cholelithiasis. GI/Bowel: No mechanical bowel obstruction. No evidence of appendicitis. Pelvis: Nonspecific presacral stranding. The prostate is diminutive. The urinary bladder contains a Clemons catheter and is nondistended. No pelvic adenopathy by size criteria. Small fat containing left inguinal hernia. Peritoneum/Retroperitoneum: Moderate atherosclerotic plaque. No ascites or pneumoperitoneum. Status post ventral hernia repair. Small fat containing left paramedian ventral hernia in left lower quadrant. Bones/Soft Tissues: No acute or aggressive osseous lesions. Small abdominal wall varices. Nonspecific presacral stranding. Otherwise no evidence of acute infectious or inflammatory process in the abdomen or pelvis. Cirrhosis with splenomegaly. XR CHEST PORTABLE    Result Date: 10/21/2021  EXAMINATION: ONE XRAY VIEW OF THE CHEST 10/21/2021 4:13 pm COMPARISON: October 15, 2021 HISTORY: ORDERING SYSTEM PROVIDED HISTORY: SOB TECHNOLOGIST PROVIDED HISTORY: SOB Reason for Exam: shortness of breath upright port FINDINGS: Marginal inspiration is present. Cardiomegaly is noted.   Interstitial and alveolar opacities are present bilaterally, slightly worse compared to the prior study. Right pleural effusion is noted. Atelectatic changes present within the lung bases. Osseous structures are stable. No pneumothorax is noted. 1. Cardiomegaly 2. Interval worsening of the interstitial alveolar opacities bilaterally, differential considerations include worsening edema versus infection 3. Small right pleural effusion     XR CHEST PORTABLE    Result Date: 10/15/2021  EXAMINATION: ONE XRAY VIEW OF THE CHEST 10/15/2021 3:05 am COMPARISON: 10/14/2021 HISTORY: ORDERING SYSTEM PROVIDED HISTORY: Verify Central Line Placement TECHNOLOGIST PROVIDED HISTORY: Verify Central Line Placement Reason for Exam: central line placement Acuity: Acute Type of Exam: Initial FINDINGS: Evaluation is limited by the patient's body habitus and the portable technique. A right internal jugular central venous catheter has been placed with the catheter tip at the cavoatrial junction. There is no discernible pneumothorax. There are now ill-defined bilateral airspace opacities. Cardiomegaly is stable. 1. Uncomplicated line placement. 2. Rapid development of bilateral airspace opacities favors pulmonary edema. XR CHEST PORTABLE    Result Date: 10/14/2021  EXAMINATION: ONE XRAY VIEW OF THE CHEST 10/14/2021 8:30 pm COMPARISON: 11/06/2020 HISTORY: ORDERING SYSTEM PROVIDED HISTORY: hypotension TECHNOLOGIST PROVIDED HISTORY: hypotension Reason for Exam: hypotension Acuity: Acute Type of Exam: Initial FINDINGS: The cardiomediastinal silhouette is normal in size and contour. The lungs are clear. No pleural effusion or pneumothorax is present. No acute cardiopulmonary process     CT CHEST PULMONARY EMBOLISM W CONTRAST    Result Date: 10/22/2021  EXAMINATION: CTA OF THE CHEST 10/21/2021 11:37 pm TECHNIQUE: CTA of the chest was performed after the administration of intravenous contrast.  Multiplanar reformatted images are provided for review.   MIP images are provided for review. Dose modulation, iterative reconstruction, and/or weight based adjustment of the mA/kV was utilized to reduce the radiation dose to as low as reasonably achievable. COMPARISON: May 4, 2016. HISTORY: ORDERING SYSTEM PROVIDED HISTORY: Dyspnea, low saturation TECHNOLOGIST PROVIDED HISTORY: Dyspnea, low saturation Decision Support Exception - unselect if not a suspected or confirmed emergency medical condition->Emergency Medical Condition (MA) Reason for Exam: Dyspnea, low saturation Acuity: Unknown Type of Exam: Unknown FINDINGS: Pulmonary Arteries: Suboptimal evaluation of the subsegmental and more peripheral pulmonary arteries due to motion artifact. Given this, no obvious acute pulmonary thromboembolic disease. Prominent main pulmonary artery measures 3.6 cm in diameter. Mediastinum: No evidence of mediastinal lymphadenopathy. The heart and pericardium demonstrate no acute abnormality. There is no acute abnormality of the thoracic aorta. Aortic valve leaflet calcifications. Lungs/pleura: Respiratory motion. Expiratory imaging. Multifocal bilateral heterogeneous opacities. Bilateral posterior lower lobe consolidations. Small bilateral pleural effusions. No pneumothorax. No endoluminal masslike lesion. Upper Abdomen: Limited images of the upper abdomen are unremarkable. Soft Tissues/Bones: Multilevel degenerative changes in the visualized spine. Chronic appearing mild multilevel vertebral body height loss in the thoracic spine. Diffusely heterogeneous marrow. Round 2.5 cm nodule in the subcutaneous soft tissues posterior to the left nipple demonstrates simple fluid attenuation and may represent a benign cyst.     1.  Suboptimal evaluation of the subsegmental and more peripheral pulmonary arteries due to motion artifact. Given this, no obvious acute pulmonary thromboembolic disease. Prominent main pulmonary artery suggests pulmonary hypertension.  2.  Multifocal bilateral heterogeneous pulmonary opacities. Findings may represent a combination of atelectasis and pulmonary edema. Multifocal pneumonia is an additional consideration. 3.  Small bilateral pleural effusions with associated lower lobe relaxation atelectasis. 4.  Round 2.5 cm nodule in the subcutaneous soft tissues posterior to the left nipple demonstrates simple fluid attenuation and may represent a benign cyst.  This could be further evaluated with focused ultrasound as warranted. EKG: NSR with QTC of 454 ms      DIAGNOSIS:  Acute on chronic diastolic congestive heart failure  Pneumonia  Chronic indwelling Clemons catheter  Delirium due to medical condition    The patient's hallucinations are related to delirium. Delirium etiology is multifactorial.  Cataracts may be a contributing factor. Abrupt discontinuation of clonazepam may be contributing factor    RISK ASSESSMENT:low risk of suicide or harm to others      RECOMMENDATIONS    Risk Management:  routine:  no special precautions necessary    Medications: Clonazepam 0.5 mg twice daily ordered (reduction from clonazepam 1 mg twice daily which the patient has been taking chronically). This can be tapered down further if needed. His other medications have been left unchanged. These include duloxetine and Seroquel  Discussed with the treating physician/ team about the patient and treatment plan  Reviewed the chart    Discussed with the patient risk, benefit, alternative and common side effects for the  proposed medication treatment. Patient is consenting to the treatment. Thanks for the consult. Please call me if needed. Theodore Ha is a 61 y.o. male being evaluated by a Virtual Visit (video visit) encounter to address concerns as mentioned above. A caregiver was present in the room along with the patient.  Pursuant to the emergency declaration under the [facility] waiver authority and the Coronavirus Preparedness and Response Supplemental Appropriations Act, this Virtual Visit was conducted with patient's (and/or legal guardian's) consent, to reduce the patient's risk of exposure to COVID-19 and provide necessary medical care. Services were provided through a video synchronous discussion virtually to substitute for in-person visit by provider. Patient is present at [place]  and I am physically present at my office in [place], [place]     --Shayy Clayton MD on 10/28/2021 at 1:42 PM    An electronic signature was used to authenticate this note. **This report has been created using voice recognition software. It may contain minor errors which are inherent in voice recognition technology. **

## 2021-10-28 NOTE — PLAN OF CARE
Problem: Pain:  Description: Pain management should include both nonpharmacologic and pharmacologic interventions.   Goal: Pain level will decrease  10/28/2021 1027 by Otilia Almanzar RN  Outcome: Ongoing  10/28/2021 0450 by Britt Sanders RN  Outcome: Ongoing  Goal: Control of acute pain  10/28/2021 1027 by Otilia Almanzar RN  Outcome: Ongoing  10/28/2021 0450 by Britt Sanders RN  Outcome: Ongoing  Goal: Control of chronic pain  10/28/2021 1027 by Otilia Almanzar RN  Outcome: Ongoing  10/28/2021 0450 by Britt Sanders RN  Outcome: Ongoing     Problem: Falls - Risk of:  Goal: Will remain free from falls  10/28/2021 1027 by Otilia Almanzar RN  Outcome: Ongoing  10/28/2021 0450 by Britt Sanders RN  Outcome: Ongoing  Goal: Absence of physical injury  10/28/2021 1027 by Otilia Almanzar RN  Outcome: Ongoing  10/28/2021 0450 by Britt Sanders RN  Outcome: Ongoing     Problem: Skin Integrity:  Goal: Will show no infection signs and symptoms  10/28/2021 1027 by Otilia Almanzar RN  Outcome: Ongoing  10/28/2021 0450 by Britt Sanders RN  Outcome: Ongoing  Goal: Absence of new skin breakdown  10/28/2021 1027 by Otilia Almanzar RN  Outcome: Ongoing  10/28/2021 0450 by Britt Sanders RN  Outcome: Ongoing

## 2021-10-28 NOTE — PLAN OF CARE
Problem: Pain:  Goal: Pain level will decrease  Description: Pain level will decrease  10/28/2021 0450 by Monica Matthews RN  Outcome: Ongoing  10/27/2021 1815 by Jeff Javed RN  Outcome: Ongoing  10/27/2021 1815 by Jeff Javed RN  Outcome: Ongoing  Goal: Control of acute pain  Description: Control of acute pain  10/28/2021 0450 by Monica Matthews RN  Outcome: Ongoing  10/27/2021 1815 by Jeff Javed RN  Outcome: Ongoing  10/27/2021 1815 by Jeff Javed RN  Outcome: Ongoing  Goal: Control of chronic pain  Description: Control of chronic pain  10/28/2021 0450 by Monica Matthews RN  Outcome: Ongoing  10/27/2021 1815 by Jeff Javed RN  Outcome: Ongoing  10/27/2021 1815 by Jeff Javed RN  Outcome: Ongoing     Problem: Falls - Risk of:  Goal: Will remain free from falls  Description: Will remain free from falls  10/28/2021 0450 by Monica Matthews RN  Outcome: Ongoing  10/27/2021 1815 by Jeff Javed RN  Outcome: Ongoing  10/27/2021 1815 by Jeff Javed RN  Outcome: Ongoing  Goal: Absence of physical injury  Description: Absence of physical injury  10/28/2021 0450 by Monica Matthews RN  Outcome: Ongoing  10/27/2021 1815 by Jeff Javed RN  Outcome: Ongoing  10/27/2021 1815 by Jeff Javed RN  Outcome: Ongoing     Problem: Skin Integrity:  Goal: Will show no infection signs and symptoms  Description: Will show no infection signs and symptoms  10/28/2021 0450 by Monica Matthews RN  Outcome: Ongoing  10/27/2021 1815 by Jeff Javed RN  Outcome: Ongoing  10/27/2021 1815 by Jeff Javed RN  Outcome: Ongoing  Goal: Absence of new skin breakdown  Description: Absence of new skin breakdown  10/28/2021 0450 by Monica Matthews RN  Outcome: Ongoing  10/27/2021 1815 by Jeff Javed RN  Outcome: Ongoing  10/27/2021 1815 by Jeff Javed RN  Outcome: Ongoing

## 2021-10-28 NOTE — PROGRESS NOTES
Congestive Heart Failure Education completed and charted. CHF booklet given. Emphasized  the  importance of medication compliance. emphasized the importance of a heart healthy diet. Discussed 2000 mg sodium-restricted daily diet. Patient instructed to limit fluid intake to  1.5 to 2 liters per day. Patient instructed to weigh self at the same time of each day each morning, reinforced teaching to monitor for 3-5 lb weight increase over 1-2 days notify physician if change noted. Signs and symptoms of CHF pointed out to patient, such as feeling more tired than normal, feeling short of breath, coughing that increases when lying down, sudden weight gain, swelling of the feet, legs or belly. Patient verbalized understanding to notify physician office if these symptoms occur. EF 50% , Low normal   Pt with poor communication skills, difficult to determine if pt comprehends teaching today. The majority  of his speech is unintelligible at this juncture.

## 2021-10-28 NOTE — CARE COORDINATION
Writer paged to bedside for hypotension. Chart reviewed. Patient labile blood pressures and multiple BP readings with systolic pressures 83-61P. Patient also intermittently lethargic and seems unable to stay awake for any length of time. He does arouse with ease however and is alert and oriented with normal conversation when awake. Patient given 500ml IV fluid bolus prior to my arrival along with 5mg PO Midodrine one time. On my eval, patients blood pressure 108/72. MAR reviewed, patient received evening coreg and klonopin, percocet, lyrica, and seroquel around 2100. Will get blood gas as patient has not been wearing his BiPAP at HS. We discussed the importance of this.      Continue to monitor blood pressures

## 2021-10-28 NOTE — PROGRESS NOTES
[facility]  Office: [address], DO, Woodruffalaina Ervin, DO, Eladio Hoff, DO, Fidencio Dorys Blood, DO, Cruz Watt MD, Talita Phelan MD, Filiberto Main MD, Tim Jarvis MD, Devin Stokes MD, Lino Parker MD, Kellen Leonard MD, Rafael Jaen MD, Kostas Schneider DO, Helga Walker DO, Adilene Morel MD,  Sarahy Pradhan DO, Mateo Guerra MD, Megan Mc MD, Tomas Zaragoza MD, Isabel Guzmán MD, Claudeen Hook, MD, Steve Mart MD, Triston Hdz, [facility], [facility], [facility], Jennifer Guerra, CNP, Marylene Bucy, CNS, Meme Anderson, CNP, Dianna Morel, CNP, Ashtyn Hsieh, CNP, Liliana Bernard, CNP, Matt Kenny, CNP, Noe Camarillo, CNP, Filiberto Lala, PA-C, Marika Houston, [facility], Reji Young, CNP, Wilner Cuevas, CNP, Ana Rosa Negron, CNP, Cristian Vang, CNP, Bijan Collins, CNP, Geovanna Mueller, CNP, Brian Weeks, [address] [place] [address]    Progress Note    10/28/2021    11:56 AM    Name:   Kandi Philip  MRN:     [id number]     Acct:      [de-identified]   Room:   [address] [place] [address] Day:  6  Admit Date:  10/21/2021  9:44 PM    PCP:   Jane Rivera MD  Code Status:  Full Code    Subjective:     C/C:   Chief Complaint   Patient presents with    Shortness of Breath    Fever     Interval History Status: not changed. Pt seen and evaluated this a.m. No new complaints. Continues to be anxious/restless. Rapid response called last evening due to hypotension. Pt blood pressures noted to be in the 60s-70s with notable lethargy. He was administered fluid bolus and responded well. Notably, patient had received coreg, klonopin, percocet, lyrica, and seroquel around 2100. Brief History:     From [facility]&P  Kelvin Boswell a 61 y.o. Non- / non  male who presents with Shortness of Breath and Fever   and is admitted to the hospital for the management of Pneumonia.   76-year-old male past medical history of urinary tract infection, type 2 diabetes, hyperlipidemia, prior alcohol abuse, depression, hypothyroidism, morbid obesity, bipolar disorder, anxiety, obstructive sleep apnea, venous insufficiency of bilateral lower extremities, history of alcoholic cirrhosis, history of portal hypertension, chronic dependence of supplemental oxygen, diastolic heart failure presents with shortness of breath with concern for pneumonia seen on chest x-ray at outlying facility.  Patient was transported to [address] [place] [address] for further care.  Of note patient was recently discharged from another [facility] due to urinary tract infection and was discharged on cipro.       Review of Systems:     Constitutional:  negative for chills, fevers, sweats  Respiratory:  negative for cough, dyspnea on exertion, shortness of breath, wheezing  Cardiovascular:  negative for chest pain, chest pressure/discomfort, lower extremity edema, palpitations  Gastrointestinal:  negative for abdominal pain, constipation, diarrhea, nausea, vomiting  Neurological:  negative for dizziness, headache    Medications: Allergies:     Allergies   Allergen Reactions    No Known Allergies        Current Meds:   Scheduled Meds:    [Held by provider] clonazePAM  1 mg Oral BID    QUEtiapine  100 mg Oral Nightly    [Held by provider] furosemide  40 mg Oral BID    influenza virus vaccine  0.5 mL IntraMUSCular Prior to discharge    potassium bicarbonate  50 mEq Oral BID    [Held by provider] losartan  50 mg Oral Daily    oxybutynin  5 mg Oral BID    [Held by provider] carvedilol  3.125 mg Oral BID WC    enoxaparin  40 mg SubCUTAneous BID    sodium chloride flush  5-40 mL IntraVENous 2 times per day    insulin lispro  0-6 Units SubCUTAneous TID WC    insulin lispro  0-3 Units SubCUTAneous Nightly    DULoxetine  60 mg Oral QAM    pregabalin  150 mg Oral BID    aspirin  81 mg Oral Daily    levothyroxine  175 mcg Oral Daily    tamsulosin  0.4 mg Oral Daily    pantoprazole  40 mg Oral QAM AC 11.3 9.5 7.5   RBC 4.01* 3.85* 3.67*   HGB 9.9* 9.8* 9.5*   HCT 34.8* 32.5* 31.9*   MCV 86.8 84.4 86.9   MCH 24.7* 25.5 25.9   MCHC 28.4 30.2 29.8   RDW 15.5* 15.5* 15.7*    203 148   MPV 10.6 11.2 11.1     Chemistry:  Recent Labs     10/26/21  0426 10/27/21  0623 10/28/21  0640    140 142   K 3.0* 3.7 3.6*   CL 93* 97* 98   CO2 28 26 29   GLUCOSE 126* 152* 109*   BUN 14 15 23*   CREATININE 0.96 1.49* 2.72*   MG 2.0 2.2 2.1   ANIONGAP 16 17 15   LABGLOM >60 49* 24*   GFRAA >60 59* 29*   CALCIUM 8.5* 8.8 8.7   PHOS 2.6 4.1 5.3*     Recent Labs     10/26/21  0426 10/26/21  0709 10/26/21  2044 10/27/21  0623 10/27/21  0642 10/27/21  1206 10/27/21  1654 10/27/21  2021 10/28/21  0310 10/28/21  0640 10/28/21  0821   LABALBU 3.2*  --   --  3.1*  --   --   --   --   --  3.0*  --    POCGLU  --    < >   < >  --  145* 140* 114* 113* 116*  --  99    < > = values in this interval not displayed. ABG:  Lab Results   Component Value Date    POCPH 7.415 10/28/2021    PHART 7.330 06/18/2014    POCPCO2 49.9 10/28/2021    THV9RJK 68.0 06/18/2014    POCPO2 162.3 10/28/2021    PO2ART 84.0 06/18/2014    POCHCO3 32.0 10/28/2021    DDI5PTV 34.9 06/18/2014    NBEA NOT REPORTED 10/28/2021    PBEA 6 10/28/2021    WIB2IRE NOT REPORTED 10/28/2021    AFHN9CKT 100 10/28/2021    N3GJVGVQ 96.5 06/18/2014    FIO2 60.0 10/28/2021     Lab Results   Component Value Date/Time    SPECIAL NOT REPORTED 10/21/2021 10:37 PM     Lab Results   Component Value Date/Time    CULTURE NO GROWTH 10/21/2021 10:37 PM       Radiology:  XR CHEST (SINGLE VIEW FRONTAL)    Result Date: 10/24/2021  Stable cardiomegaly with low lung volumes and small bilateral pleural effusions. Patchy bilateral airspace disease is not significantly changed from prior. CT HEAD WO CONTRAST    Result Date: 10/22/2021  No acute intracranial abnormality. MRI may be obtained if clinically indicated.      CT ABDOMEN PELVIS W IV CONTRAST Additional Contrast? None    Result Date: 10/22/2021  Nonspecific presacral stranding. Otherwise no evidence of acute infectious or inflammatory process in the abdomen or pelvis. Cirrhosis with splenomegaly. XR CHEST PORTABLE    Result Date: 10/21/2021  1. Cardiomegaly 2. Interval worsening of the interstitial alveolar opacities bilaterally, differential considerations include worsening edema versus infection 3. Small right pleural effusion     CT CHEST PULMONARY EMBOLISM W CONTRAST    Result Date: 10/22/2021  1. Suboptimal evaluation of the subsegmental and more peripheral pulmonary arteries due to motion artifact. Given this, no obvious acute pulmonary thromboembolic disease. Prominent main pulmonary artery suggests pulmonary hypertension. 2.  Multifocal bilateral heterogeneous pulmonary opacities. Findings may represent a combination of atelectasis and pulmonary edema. Multifocal pneumonia is an additional consideration. 3.  Small bilateral pleural effusions with associated lower lobe relaxation atelectasis. 4.  Round 2.5 cm nodule in the subcutaneous soft tissues posterior to the left nipple demonstrates simple fluid attenuation and may represent a benign cyst.  This could be further evaluated with focused ultrasound as warranted.        Physical Examination:        General appearance:  alert, cooperative and no distress  Mental Status:  oriented to person, place and time and normal affect  Lungs:  clear to auscultation bilaterally, normal effort  Heart:  regular rate and rhythm, no murmur  Abdomen:  soft, nontender, nondistended, normal bowel sounds, no masses, hepatomegaly, splenomegaly  Extremities:  no edema, redness, tenderness in the calves  Skin:  no gross lesions, rashes, induration  Psych: anxious, restless    Assessment:        Hospital Problems         Last Modified POA    * (Principal) Acute on chronic diastolic congestive heart failure ([facility] [address] 10/24/2021 Yes    Overview Signed 10/14/2017  7:34 PM by Alies Martino Updating Deprecated Diagnoses         Alcoholic cirrhosis of liver ([address] sober since 2013 10/22/2021 Yes    Bipolar disorder ([address] 10/22/2021 Yes    Type 2 diabetes mellitus with diabetic neuropathy, with long-term current use of insulin ([address] 10/22/2021 Yes    FABI (obstructive sleep apnea) 10/22/2021 Yes    Primary osteoarthritis of right knee (Chronic) 10/22/2021 Yes    Type 2 diabetes mellitus with diabetic neuropathy ([address] (Chronic) 10/22/2021 Yes    Acquired hypothyroidism 10/22/2021 Yes    Urine retention 10/22/2021 Yes    Portal hypertension ([address] (Chronic) 10/22/2021 Yes    Venous stasis dermatitis of both lower extremities (Chronic) 10/22/2021 Yes    Chronic indwelling Clemons catheter (Chronic) 10/22/2021 Yes    Anxiety and depression 10/22/2021 Yes    BPH (benign prostatic hyperplasia) 10/22/2021 Yes    Morbid obesity ([address] 10/22/2021 Yes    On home oxygen therapy 10/22/2021 Yes    Overview Signed 11/4/2020  2:08 PM by SUNSHINE Forrester - NP     prn         Pneumonia 10/24/2021 Yes          Plan:        Acute respiratory failure with hypoxia-improving -likely pulmonary edema, Lasix 40 mg IV last few days, diuresed approximately 10 L since admission. Pt has developed LEONARDA. Likely from over diuresis. Will hold lasix. LEONARDA - from over diuresis, ischemic ATN secondary to hypotension. Hold lasix, losartan. Continuous IV fluids. Strict I/O. Monitor renal function daily. Hallucinationscould be from underlying Biopolar. Will have psych see patient. Essential hypertension stop catapres. Hold Coreg, Cozaar with hypotension/LEONARDA. Hypothyroidismcontinue Synthroid  Urinary retention f/u outpatient, chronic urinary retention, continue Flomax did prevent.   Urology follow-up  Type 2 diabetescontinue sliding scale and POC glucose  Super morbid obesity  FABI  Bipolar disorder    Hazel Jenkins PA-C  10/28/2021  11:56 AM

## 2021-10-28 NOTE — PROGRESS NOTES
PULMONARY & CRITICAL CARE MEDICINE PROGRESS NOTE     Patient:  Kandi Philip  MRN: [id number]  Admit date: 10/21/2021  Primary Care Physician: Jane Rivera MD  Consulting Physician: Araceli Roberto DO    HISTORY     CHIEF COMPLAINT/REASON FOR CONSULT: SOB    HISTORY OF PRESENT ILLNESS:  The patient is a 61 y.o. male care with complaint of shortness of breath and fever. Patient transferred from [facility] after treating with antibiotics for pneumonia. Poor historian, delayed responses. However alert oriented x3. Stated he has been sick for the past few days, shortness of breath, low-grade fevers. Denies any sick contacts. Lives with his son. Uses wheelchair for ambulation, able to take care of himself. Patient received [employer] code vaccine. Not received flu shot. Denies any smoking history. Never diagnosed with any COPD. Uses 34L oxygen at home. Not using any CPAP at home. Denies chest pain, urinary symptoms, nausea, vomiting, headache. Significant history of T2DM, hyperlipidemia, recent UTI, alcohol abuse, depression, hypothyroidism, morbid obesity, FABI, bilateral lower extremity venous insufficiency, alcohol cirrhosis, hypertension, diastolic heart failure on oxygen. CT PE negative for PE, however suboptimal evaluation. Showing pulmonary hypertension, multifocal opacities concerning for atelectasis/pneumonia. CT abdomen showing liver cirrhosis with splenomegaly. Echo with severe LVH in 2014. CT PE concerning for pulmonary hypertension. Morbid obesity. History of MDRO.     INTERVAL HISTORY:  Pt seen and examined  Stable, on NC  Using Bipap at night  Still haviing hallucinations  Possible long hospital stay, sedentary, cataracts, clonazepam causing delirium    PAST MEDICAL HISTORY:        Diagnosis Date    Anxiety and depression     Back pain, chronic     BPH (benign prostatic hyperplasia)     CHF (congestive heart failure) (HCC)     Cir[facility]osis [facility]     Diabetes mellitus ([facility] [address]     not checking bloodsugar at home    GERD (gastroesophageal reflux disease)     H/O cardiac catheterization not sure of date    no stents    Hepatitis     Pt does not know the type.  Hiatal hernia     History of alcohol abuse     Sober since 2013    Hyperlipidemia     not taking any medication    Hypertension     IBS (irritable bowel syndrome)     Morbid obesity ([facility] [address]     Neuropathy     feet,toes    On home oxygen therapy     DME for home oxgygen at 2.5 lpm at HS and as needed    Pancreatitis     x 5 episodes    Primary osteoarthritis of right knee     Sleep apnea     suspected juany, never has had PSG study.   HAS NO MACHINE    Thyroid disease     Urinary bladder neurogenic dysfunction      PAST SURGICAL HISTORY:        Procedure Laterality Date    ABDOMEN SURGERY      hernia repair x4 (ventral)    COLONOSCOPY      2012    CYSTOSCOPY  01/24/2018    CYSTOSCOPY  02/20/2019    CYSTOSCOPY N/A 2/20/2019    CYSTOSCOPY DILATION performed by Shannan Cuevas MD at [address] [place] [address] 8/23/2019    CYSTOSCOPY/ DILATION NICOLE CATHETER EXCHANGE performed by Shannan Cuevas MD at [address], COLON, DIAGNOSTIC     [address]  05/20/2018    repair and reduction of recurrent incarcerated incisional hernia with mesh    TN CYSTOURETHROSCOPY N/A 1/24/2018    CYSTOSCOPY Minor Marus performed by Shannan Cuevas MD at [address] Bilateral 8/22/2017    FOOT [address] with bone bx performed by Elizabeth Hager DPM at [address] EGD TRANSORAL BIOPSY SINGLE/MULTIPLE N/A 7/19/2017    EGD BIOPSY performed by Samuel Sheikh MD at [address]  07/19/2017    polypectomy    UPPER GASTROINTESTINAL ENDOSCOPY N/A 3/14/2019    EGD BIOPSY performed by Magdi Rea MD at [address] N/A 5/20/2018    REPAIR AND REDUCTION OF RECURRENT INCARCERATED INCISIONAL HERNIA WITH MESH performed by Lawrence Lyon MD at [facility]:       Adopted: Yes     SOCIAL HISTORY:   TOBACCO:   reports that he has never smoked. He has never used smokeless tobacco.  ETOH:  reports no history of alcohol use. DRUGS: reports no history of drug use. AVOCATION/OCCUPATIONAL EXPOSURE:    The patient denies asbestos, silica dust, coal, foundry, quarry or [employer] exposure. The patient denies  to having pet dogs, cats, turtles or exotic birds at home. There is no history of TB or TB exposure. There is no exposure to sick contacts. Travel history is not significant history of risk factors for pulmonary disease. The patient denies using [employer]. ALLERGIES:    Allergies   Allergen Reactions    No Known Allergies          HOME MEDICATIONS:  Prior to Admission medications    Medication Sig Start Date End Date Taking? Authorizing Provider   ciprofloxacin (CIPRO) 500 MG tablet Take 1 tablet by mouth every 12 hours for 10 days 10/18/21 10/28/21  Audrey Samayoa,    insulin glargine (BASAGLAR KWIKPEN) 100 UNIT/ML injection pen Inject 76 Units into the skin 2 times daily    Historical Provider, MD   pregabalin (LYRICA) 150 MG capsule Take 150 mg by mouth 2 times daily.     Historical Provider, MD   ibuprofen (ADVIL;MOTRIN) 600 MG tablet Take 600 mg by mouth every 6 hours as needed for Pain    Historical Provider, MD   SITagliptin (JANUVIA) 100 MG tablet Take 100 mg by mouth daily    Historical Provider, MD   glipiZIDE (GLUCOTROL) 10 MG tablet Take 10 mg by mouth 2 times daily (before meals)    Historical Provider, MD   metFORMIN (GLUCOPHAGE) 500 MG tablet Take 500 mg by mouth 2 times daily (with meals)    Historical Provider, MD   nystatin (MYCOSTATIN) 184342 UNIT/GM cream Apply topically 2 times daily as needed (to skin folds)    Historical Provider, MD   clonazePAM (KLONOPIN) 1 MG tablet Take 1 tablet by mouth 2 times daily as needed for Anxiety for up to 3 days. 1/14/21 1/17/21  Filiberto Main MD   oxyCODONE-acetaminophen (PERCOCET) 7.5-325 MG per tablet Take 1 tablet by mouth every 6 hours as needed for Pain. ; Dispense 30 1/5/21   Historical Provider, MD   QUEtiapine (SEROQUEL XR) 50 MG extended release tablet Take 50 mg by mouth nightly    Historical Provider, MD   triamcinolone (KENALOG) 0.025 % cream Apply topically 2 times daily as needed    Historical Provider, MD   diphenoxylate-atropine (LOMOTIL) 2.5-0.025 MG per tablet Take 1 tablet by mouth 4 times daily as needed for Diarrhea. Historical Provider, MD   fluocinonide (LIDEX) 0.05 % external solution Apply topically as needed (scalp itching)    Historical Provider, MD   ketoconazole (NIZORAL) 2 % shampoo Apply topically Twice a Week    Historical Provider, MD   albuterol sulfate HFA (PROAIR HFA) 108 (90 Base) MCG/ACT inhaler Inhale 2 puffs into the lungs every 6 hours as needed for Wheezing    Historical Provider, MD   tamsulosin (FLOMAX) 0.4 MG capsule Take 1 capsule by mouth daily 1/24/18   Yulissa Crain MD   oxybutynin (DITROPAN) 5 MG tablet Take 1 tablet by mouth 2 times daily 12/12/17   Mayi Samayoa DO   bumetanide (BUMEX) 2 MG tablet Take 2 mg by mouth 2 times daily    Historical Provider, MD   aspirin 81 MG EC tablet Take 1 tablet by mouth daily 7/26/17   Ly Cervantes DO   nadolol (CORGARD) 40 MG tablet Take 40 mg by mouth daily    Historical Provider, MD   levothyroxine (SYNTHROID) 175 MCG tablet Take 175 mcg by mouth Daily     Historical Provider, MD   nystatin (MYCOSTATIN) 917420 UNIT/GM powder Apply topically 2 times daily as needed TO ABDOMINAL FOLDS, GROIN     Historical Provider, MD   esomeprazole (NEXIUM) 40 MG capsule Take 40 mg by mouth 2 times daily     Historical Provider, MD   DULoxetine (CYMBALTA) 60 MG capsule Take 60 mg by mouth every morning     Historical Provider, MD   venlafaxine (EFFEXOR-XR) 150 MG XR capsule Take 150 mg by mouth daily.     Historical mL IntraMUSCular Prior to discharge    potassium bicarbonate  50 mEq Oral BID    [Held by provider] losartan  50 mg Oral Daily    oxybutynin  5 mg Oral BID    [Held by provider] carvedilol  3.125 mg Oral BID     enoxaparin  40 mg SubCUTAneous BID    sodium chloride flush  5-40 mL IntraVENous 2 times per day    insulin lispro  0-6 Units SubCUTAneous TID     insulin lispro  0-3 Units SubCUTAneous Nightly    DULoxetine  60 mg Oral QAM    aspirin  81 mg Oral Daily    levothyroxine  175 mcg Oral Daily    tamsulosin  0.4 mg Oral Daily    pantoprazole  40 mg Oral QAM AC     Continuous Infusions:   sodium chloride 75 mL/hr at 10/28/21 0847    sodium chloride 25 mL (10/26/21 0651)    dextrose       INPUT/OUTPUT:  In: 2533.2 [P.O.:300; I.V.:2233.2]  Out: 400 [Urine:400]    LABS:-  ABGs:   No results found for: PH, PCO2, PO2, HCO3, O2SAT  No results for input(s): PHART, PO2ART, LFZ8RUN, ZLM4YUL, BEART, Z9EXXGTZ in the last 72 hours. Lab Results   Component Value Date    POCPH 7.415 10/28/2021    POCPCO2 49.9 (H) 10/28/2021    POCPO2 162.3 (H) 10/28/2021    POCHCO3 32.0 (H) 10/28/2021    XTGM4PSM 100 (H) 10/28/2021     CBC:   Recent Labs     10/26/21  0426 10/27/21  0623 10/28/21  0640   WBC 11.3 9.5 7.5   HGB 9.9* 9.8* 9.5*   HCT 34.8* 32.5* 31.9*   MCV 86.8 84.4 86.9    203 148   RBC 4.01* 3.85* 3.67*   MCH 24.7* 25.5 25.9   MCHC 28.4 30.2 29.8   RDW 15.5* 15.5* 15.7*     BMP:   Recent Labs     10/26/21  0426 10/27/21  0623 10/28/21  0640    140 142   K 3.0* 3.7 3.6*   CL 93* 97* 98   CO2 28 26 29   BUN 14 15 23*   CREATININE 0.96 1.49* 2.72*   GLUCOSE 126* 152* 109*   PHOS 2.6 4.1 5.3*     Liver Function Test:   Recent Labs     10/28/21  0640   LABALBU 3.0*     Amylase/Lipase:  No results for input(s): AMYLASE, LIPASE in the last 72 hours. Coagulation Profile:   No results for input(s): INR, PROTIME, APTT in the last 72 hours.   Cardiac Enzymes:  No results for input(s): CKTOTAL, CKMB, Mickiel Trina in the last 72 hours. Lactic Acid:  Lab Results   Component Value Date    LACTA 2.0 11/06/2020    LACTA 1.9 11/04/2020    LACTA 0.8 12/07/2017     BNP:   Lab Results   Component Value Date    BNP NOT REPORTED 06/18/2014    BNP NOT REPORTED 06/17/2014    BNP 8 07/07/2012     D-Dimer:  Lab Results   Component Value Date    DDIMER 2.49 05/04/2016     Others:   Lab Results   Component Value Date    TSH 16.83 (H) 10/21/2021    Z1RKPRJ 5.7 06/22/2014     Lab Results   Component Value Date    MARISA NEGATIVE 06/19/2014    SEDRATE 77 (H) 11/06/2020    CRP 42.3 (H) 11/06/2020     No results found for: Annemarie Osborne  Lab Results   Component Value Date    IRON 29 (L) 05/07/2016    TIBC 291 05/07/2016    FERRITIN 109 05/07/2016     No results found for: SPEP, UPEP  No results found for: PSA, CEA, , [id number],   Microbiology:    Pathology:    Radiology Reports:  XR CHEST (SINGLE VIEW FRONTAL)   Final Result   Stable cardiomegaly with low lung volumes and small bilateral pleural   effusions. Patchy bilateral airspace disease is not significantly changed   from prior. CT HEAD WO CONTRAST   Final Result   No acute intracranial abnormality. MRI may be obtained if clinically   indicated. CT CHEST PULMONARY EMBOLISM W CONTRAST   Final Result   1. Suboptimal evaluation of the subsegmental and more peripheral pulmonary   arteries due to motion artifact. Given this, no obvious acute pulmonary   thromboembolic disease. Prominent main pulmonary artery suggests pulmonary   hypertension. 2.  Multifocal bilateral heterogeneous pulmonary opacities. Findings may   represent a combination of atelectasis and pulmonary edema. Multifocal   pneumonia is an additional consideration. 3.  Small bilateral pleural effusions with associated lower lobe relaxation   atelectasis.       4.  Round 2.5 cm nodule in the subcutaneous soft tissues posterior to the   left nipple demonstrates simple fluid attenuation and may represent a benign   cyst.  This could be further evaluated with focused ultrasound as warranted. CT ABDOMEN PELVIS W IV CONTRAST Additional Contrast? None   Final Result   Nonspecific presacral stranding. Otherwise no evidence of acute infectious   or inflammatory process in the abdomen or pelvis. Cirrhosis with splenomegaly. XR CHEST PORTABLE   Final Result   1. Cardiomegaly   2. Interval worsening of the interstitial alveolar opacities bilaterally,   differential considerations include worsening edema versus infection   3. Small right pleural effusion             Pulmonary Function test:    Polysomnogram:    Echocardiogram:   No results found for this or any previous visit. Cardiac Catheterization:   No results found for this or any previous visit. ASSESSMENT AND PLAN     Assessment:  Acute hypoxic respiratory failure  Acute delirium- multifactorial. Long hospital stay, sedentary, cataracts, off clonazepam.  Obesity hypoventilation on 45 L NC at home. Obstructive sleep apnea  Never smoked, no history of COPD/asthma or any lung disease. morbid obesity  Pulmonary HTN  Multiple comorbidities: T2DM, hyperlipidemia, recent UTI, alcohol abuse, depression, hypothyroidism, bilateral lower extremity venous insufficiency, Venous stasis dermatitis, alcohol cirrhosis, hypertension, diastolic heart failure. Plan:    I personally interviewed/examined the patient; reviewed interval history, interpreted all available radiographic and laboratory data at the time of service. ABGs improving with use of BiPAP  Mobilize out of bed and to the chair, PT, OT to work with. Appreciate Psych evaluation. Continue BiPAP even day time as tolerated, needs CPAP/BiPAP for discharge as well. Patient on home baseline 4-5 L O2. Keep saturations 92-94%  Echo with preserved EF, no pulmonary HTN.   Continue incentive spirometry, pulmonary toilet, aspiration precautions and bronchodilators  Continue to monitor I/O with a goal of even/negative fluid balance      DVT and stress ulcer prophylaxis  Physical/occupational therapy; increase activity as tolerated. Yaya Briscoe MD,    Pulmonary and Critical Care Medicine           10/28/2021, 3:56 PM   Attending Physician Statement  I have discussed the care of Day Nevarez, including pertinent history and exam findings,  with the resident. I have seen and examined the patient and the key elements of all parts of the encounter have been performed by me. I agree with the assessment, plan and orders as documented by the resident with additions . Treatment plan Discussed with nursing staff in detail , all questions answered . Electronically signed by Sabiha Atkins MD on   10/28/21 at 10:10 PM EDT    Please note that this chart was generated using voice recognition Dragon dictation software. Although every effort was made to ensure the accuracy of this automated transcription, some errors in transcription may have occurred.

## 2021-10-29 VITALS
HEART RATE: 73 BPM | HEIGHT: 70 IN | OXYGEN SATURATION: 100 % | RESPIRATION RATE: 22 BRPM | DIASTOLIC BLOOD PRESSURE: 52 MMHG | TEMPERATURE: 98.2 F | SYSTOLIC BLOOD PRESSURE: 107 MMHG | WEIGHT: 315 LBS | BODY MASS INDEX: 45.1 KG/M2

## 2021-10-29 PROBLEM — I50.33 ACUTE ON CHRONIC DIASTOLIC CONGESTIVE HEART FAILURE (HCC): Status: RESOLVED | Noted: 2017-07-18 | Resolved: 2021-10-29

## 2021-10-29 PROBLEM — J18.9 PNEUMONIA: Status: RESOLVED | Noted: 2021-10-22 | Resolved: 2021-10-29

## 2021-10-29 LAB
ALBUMIN SERPL-MCNC: 2.8 G/DL (ref 3.5–5.2)
ANION GAP SERPL CALCULATED.3IONS-SCNC: 13 MMOL/L (ref 9–17)
BUN BLDV-MCNC: 18 MG/DL (ref 6–20)
BUN/CREAT BLD: ABNORMAL (ref 9–20)
CALCIUM SERPL-MCNC: 8.8 MG/DL (ref 8.6–10.4)
CHLORIDE BLD-SCNC: 101 MMOL/L (ref 98–107)
CO2: 28 MMOL/L (ref 20–31)
CREAT SERPL-MCNC: 1.48 MG/DL (ref 0.7–1.2)
GFR AFRICAN AMERICAN: 59 ML/MIN
GFR NON-AFRICAN AMERICAN: 49 ML/MIN
GFR SERPL CREATININE-BSD FRML MDRD: ABNORMAL ML/MIN/{1.73_M2}
GFR SERPL CREATININE-BSD FRML MDRD: ABNORMAL ML/MIN/{1.73_M2}
GLUCOSE BLD-MCNC: 103 MG/DL (ref 75–110)
GLUCOSE BLD-MCNC: 116 MG/DL (ref 70–99)
GLUCOSE BLD-MCNC: 135 MG/DL (ref 75–110)
HCT VFR BLD CALC: 31.6 % (ref 40.7–50.3)
HEMOGLOBIN: 8.9 G/DL (ref 13–17)
MAGNESIUM: 2.1 MG/DL (ref 1.6–2.6)
MCH RBC QN AUTO: 24.9 PG (ref 25.2–33.5)
MCHC RBC AUTO-ENTMCNC: 28.2 G/DL (ref 28.4–34.8)
MCV RBC AUTO: 88.5 FL (ref 82.6–102.9)
NRBC AUTOMATED: 0 PER 100 WBC
PDW BLD-RTO: 15.3 % (ref 11.8–14.4)
PHOSPHORUS: 3.3 MG/DL (ref 2.5–4.5)
PLATELET # BLD: 138 K/UL (ref 138–453)
PMV BLD AUTO: 11 FL (ref 8.1–13.5)
POTASSIUM SERPL-SCNC: 4 MMOL/L (ref 3.7–5.3)
RBC # BLD: 3.57 M/UL (ref 4.21–5.77)
SODIUM BLD-SCNC: 142 MMOL/L (ref 135–144)
WBC # BLD: 4.8 K/UL (ref 3.5–11.3)

## 2021-10-29 PROCEDURE — 94660 CPAP INITIATION&MGMT: CPT

## 2021-10-29 PROCEDURE — 6360000002 HC RX W HCPCS: Performed by: INTERNAL MEDICINE

## 2021-10-29 PROCEDURE — 99239 HOSP IP/OBS DSCHRG MGMT >30: CPT | Performed by: INTERNAL MEDICINE

## 2021-10-29 PROCEDURE — G0008 ADMIN INFLUENZA VIRUS VAC: HCPCS | Performed by: INTERNAL MEDICINE

## 2021-10-29 PROCEDURE — 85027 COMPLETE CBC AUTOMATED: CPT

## 2021-10-29 PROCEDURE — 6370000000 HC RX 637 (ALT 250 FOR IP): Performed by: PHYSICIAN ASSISTANT

## 2021-10-29 PROCEDURE — 6370000000 HC RX 637 (ALT 250 FOR IP): Performed by: PSYCHIATRY & NEUROLOGY

## 2021-10-29 PROCEDURE — APPSS45 APP SPLIT SHARED TIME 31-45 MINUTES: Performed by: PHYSICIAN ASSISTANT

## 2021-10-29 PROCEDURE — 90686 IIV4 VACC NO PRSV 0.5 ML IM: CPT | Performed by: INTERNAL MEDICINE

## 2021-10-29 PROCEDURE — 6370000000 HC RX 637 (ALT 250 FOR IP): Performed by: STUDENT IN AN ORGANIZED HEALTH CARE EDUCATION/TRAINING PROGRAM

## 2021-10-29 PROCEDURE — 82947 ASSAY GLUCOSE BLOOD QUANT: CPT

## 2021-10-29 PROCEDURE — 97530 THERAPEUTIC ACTIVITIES: CPT

## 2021-10-29 PROCEDURE — 83735 ASSAY OF MAGNESIUM: CPT

## 2021-10-29 PROCEDURE — 6370000000 HC RX 637 (ALT 250 FOR IP): Performed by: NURSE PRACTITIONER

## 2021-10-29 PROCEDURE — 80069 RENAL FUNCTION PANEL: CPT

## 2021-10-29 PROCEDURE — 97110 THERAPEUTIC EXERCISES: CPT

## 2021-10-29 PROCEDURE — 94761 N-INVAS EAR/PLS OXIMETRY MLT: CPT

## 2021-10-29 PROCEDURE — 36415 COLL VENOUS BLD VENIPUNCTURE: CPT

## 2021-10-29 PROCEDURE — 2580000003 HC RX 258: Performed by: NURSE PRACTITIONER

## 2021-10-29 PROCEDURE — 2700000000 HC OXYGEN THERAPY PER DAY

## 2021-10-29 PROCEDURE — 6370000000 HC RX 637 (ALT 250 FOR IP): Performed by: INTERNAL MEDICINE

## 2021-10-29 RX ORDER — FUROSEMIDE 40 MG/1
40 TABLET ORAL DAILY
Qty: 30 TABLET | Refills: 0 | Status: ON HOLD | DISCHARGE
Start: 2021-10-29 | End: 2022-01-17 | Stop reason: ALTCHOICE

## 2021-10-29 RX ORDER — CLONAZEPAM 0.5 MG/1
0.5 TABLET ORAL 2 TIMES DAILY
Qty: 6 TABLET | Refills: 0 | Status: ON HOLD | OUTPATIENT
Start: 2021-10-29 | End: 2022-01-17

## 2021-10-29 RX ORDER — OXYCODONE AND ACETAMINOPHEN 7.5; 325 MG/1; MG/1
1 TABLET ORAL EVERY 6 HOURS PRN
Qty: 12 TABLET | Refills: 0 | Status: SHIPPED | OUTPATIENT
Start: 2021-10-29 | End: 2021-11-01

## 2021-10-29 RX ORDER — LOSARTAN POTASSIUM 50 MG/1
50 TABLET ORAL DAILY
Qty: 30 TABLET | Refills: 3 | Status: ON HOLD | DISCHARGE
Start: 2021-10-30 | End: 2022-01-21 | Stop reason: HOSPADM

## 2021-10-29 RX ADMIN — ASPIRIN 81 MG: 81 TABLET, CHEWABLE ORAL at 09:38

## 2021-10-29 RX ADMIN — INFLUENZA A VIRUS A/VICTORIA/2570/2019 IVR-215 (H1N1) ANTIGEN (PROPIOLACTONE INACTIVATED), INFLUENZA A VIRUS A/CAMBODIA/E0826360/2020 IVR-224 (H3N2) ANTIGEN (PROPIOLACTONE INACTIVATED), INFLUENZA B VIRUS B/VICTORIA/705/2018 BVR-11 ANTIGEN (PROPIOLACTONE INACTIVATED), INFLUENZA B VIRUS B/PHUKET/3073/2013 BVR-1B ANTIGEN (PROPIOLACTONE INACTIVATED) 0.5 ML: 15; 15; 15; 15 INJECTION, SUSPENSION INTRAMUSCULAR at 14:48

## 2021-10-29 RX ADMIN — TAMSULOSIN HYDROCHLORIDE 0.4 MG: 0.4 CAPSULE ORAL at 09:37

## 2021-10-29 RX ADMIN — ENOXAPARIN SODIUM 40 MG: 40 INJECTION SUBCUTANEOUS at 09:37

## 2021-10-29 RX ADMIN — LEVOTHYROXINE SODIUM 175 MCG: 175 TABLET ORAL at 06:48

## 2021-10-29 RX ADMIN — PANTOPRAZOLE SODIUM 40 MG: 40 TABLET, DELAYED RELEASE ORAL at 06:48

## 2021-10-29 RX ADMIN — POTASSIUM BICARBONATE 50 MEQ: 977.5 TABLET, EFFERVESCENT ORAL at 09:38

## 2021-10-29 RX ADMIN — SODIUM CHLORIDE, PRESERVATIVE FREE 10 ML: 5 INJECTION INTRAVENOUS at 09:37

## 2021-10-29 RX ADMIN — PREGABALIN 100 MG: 100 CAPSULE ORAL at 09:37

## 2021-10-29 RX ADMIN — DULOXETINE HYDROCHLORIDE 60 MG: 30 CAPSULE, DELAYED RELEASE ORAL at 09:37

## 2021-10-29 RX ADMIN — CLONAZEPAM 0.5 MG: 0.5 TABLET ORAL at 09:37

## 2021-10-29 RX ADMIN — OXYBUTYNIN CHLORIDE 5 MG: 5 TABLET ORAL at 09:38

## 2021-10-29 ASSESSMENT — ENCOUNTER SYMPTOMS: TACHYPNEA: 1

## 2021-10-29 ASSESSMENT — PAIN SCALES - GENERAL: PAINLEVEL_OUTOF10: 0

## 2021-10-29 NOTE — PLAN OF CARE
Problem: Pain:  Goal: Pain level will decrease  Description: Pain level will decrease  Outcome: Ongoing  Goal: Control of acute pain  Description: Control of acute pain  Outcome: Ongoing  Goal: Control of chronic pain  Description: Control of chronic pain  Outcome: Ongoing     Problem: Falls - Risk of:  Goal: Will remain free from falls  Description: Will remain free from falls  Outcome: Ongoing  Goal: Absence of physical injury  Description: Absence of physical injury  Outcome: Ongoing     Problem: Skin Integrity:  Goal: Will show no infection signs and symptoms  Description: Will show no infection signs and symptoms  Outcome: Ongoing  Goal: Absence of new skin breakdown  Description: Absence of new skin breakdown  Outcome: Ongoing     Problem: Urinary Elimination:  Goal: Signs and symptoms of infection will decrease  Description: Signs and symptoms of infection will decrease  Outcome: Ongoing  Goal: Complications related to the disease process, condition or treatment will be avoided or minimized  Description: Complications related to the disease process, condition or treatment will be avoided or minimized  Outcome: Ongoing

## 2021-10-29 NOTE — DISCHARGE SUMMARY
[facility]  Office: [address], DO, Nico Benne, DO, Jeanawindy Melendez, DO, Deisy Anderson, DO, Garrison Ambrose MD, Aniceto Caro MD, Dano Alvarado MD, Hermes Rogers MD, Gloria Mcbride MD, Bautista Anguiano MD, Elizabeth Domínguez MD, Glo Moritz, MD, Linwood Rios, DO, Silvano Gaxiola DO, Brett Cerda MD,  Tamara Keyes, DO, David Howe MD, Dian Crawford MD, Barby Hall MD, Chago Asif MD, Ba Melchor MD, Rosalee Chadwick MD, [facility], [place], CNP, Luz Elena Painting, CNP, Luis Bowser, CNS, Darien Hester, CNP, Mary Morrison, CNP, Shanti Ramos, CNP, Jeanette Vasquez, CNP, Angle Hendrickson, CNP, Lashell Yates, CNP, Jacqui Paul PA-C, Silverio Harris, [facility], Edward Livingston, CNP, Gm Guerrero, CNP, Gardenia Callahan, CNP, Colonel Brooks, CNP, Nadira Tejada, CNP, Jagdish Price, CNP, Radha Mendoza, [facility]    Discharge Summary     Patient ID: Cherelle Daigle  :  1965   MRN: [id number]     ACCOUNT:  [de-identified]   Patient's PCP: Ade Zayas MD  Admit Date: 10/21/2021   Discharge Date: 10/29/2021  Length of Stay: 7  Code Status:  Full Code  Admitting Physician: No admitting provider for patient encounter.   Discharge Physician: Ashia Albrecht     Active Discharge Diagnoses:     Hospital Problem Lists:  Principal Problem (Resolved):    Acute on chronic diastolic congestive heart failure [facility])  Active Problems:    Alcoholic cirrhosis of liver ([facility] sober since     Bipolar disorder ([facility]    Type 2 diabetes mellitus with diabetic neuropathy, with long-term current use of insulin (HCC)    FABI (obstructive sleep apnea)    Primary osteoarthritis of right knee    Type 2 diabetes mellitus with diabetic neuropathy (HCC)    Acquired hypothyroidism    Urine retention    Portal hypertension ([address]    Venous stasis dermatitis of both lower extremities    Chronic indwelling Clemons catheter    Anxiety and depression    BPH (benign prostatic hyperplasia)    Morbid obesity ([address]    On home oxygen therapy  Resolved Problems:    Pneumonia      Admission Condition:  poor     Discharged Condition: fair    Hospital Stay:     Hospital Course:      Melissa weaver 61 y.o. Non- / non  male who presents with Shortness of Breath and Fever   and is admitted to the hospital for the management of Pneumonia. 43-year-old male past medical history of urinary tract infection, type 2 diabetes, hyperlipidemia, prior alcohol abuse, depression, hypothyroidism, morbid obesity, bipolar disorder, anxiety, obstructive sleep apnea, venous insufficiency of bilateral lower extremities, history of alcoholic cirrhosis, history of portal hypertension, chronic dependence of supplemental oxygen, diastolic heart failure presents with shortness of breath with concern for pneumonia seen on chest x-ray at outlying facility.  Patient was transported to [address] [place] [address] for further care.  Of note patient was recently discharged from another [facility] due to urinary tract infection and was discharged on cipro. Significant therapeutic interventions:     Acute respiratory failure with hypoxia- Improved. secondary to pulmonary edema. Diuresed over 10L. Pt developed LEONARDA following diuresis. Lasix held. LEONARDA - from over diuresis, ischemic ATN secondary to hypotension. Lasix, losartan held. Creatinine improved from 2.72 --> 1.48 with IV fluids. Continue to hold lasix, losartan until creatinine normalizes. Repeat BMP in 3 days. When lasix is resumed, resume at 40 mg daily. Can titrate as deemed appropriate. Essential hypertension d/c coreg, hold Cozaar with hypotension/LEONARDA.    Hypothyroidismcontinue Synthroid  Urinary retention f/u outpatient, chronic urinary retention, continue Flomax. Wells exchanged on 10/21. Exchange wells every 2-3 weeks. Follow-up with OP urologist.  Type 2 diabetes glucose well controlled.  Resume home regimen  ? Bicuspid aortic valve, mild aortic stenosis: PCP to continue to monitor. Consider cardiology referral if pt becoming symptomatic  Super morbid obesity  FABI  Bipolar disorder: clonopin reduced to 0.5 mg bid following psych evaluation. Continue seroquel 50 mg nightly.  Duloxetine 60 mg every morning.       Significant Diagnostic Studies:   Labs / Micro:  CBC:   Lab Results   Component Value Date    WBC 4.8 10/29/2021    RBC 3.57 10/29/2021    RBC 3.93 04/02/2012    HGB 8.9 10/29/2021    HCT 31.6 10/29/2021    MCV 88.5 10/29/2021    MCH 24.9 10/29/2021    MCHC 28.2 10/29/2021    RDW 15.3 10/29/2021     10/29/2021     04/02/2012     BMP:    Lab Results   Component Value Date    GLUCOSE 116 10/29/2021    GLUCOSE 102 08/30/2011     10/29/2021    K 4.0 10/29/2021     10/29/2021    CO2 28 10/29/2021    ANIONGAP 13 10/29/2021    BUN 18 10/29/2021    CREATININE 1.48 10/29/2021    BUNCRER NOT REPORTED 10/29/2021    CALCIUM 8.8 10/29/2021    LABGLOM 49 10/29/2021    GFRAA 59 10/29/2021    GFR      10/29/2021    GFR NOT REPORTED 10/29/2021     HFP:    Lab Results   Component Value Date    PROT 7.4 10/21/2021     CMP:    Lab Results   Component Value Date    GLUCOSE 116 10/29/2021    GLUCOSE 102 08/30/2011     10/29/2021    K 4.0 10/29/2021     10/29/2021    CO2 28 10/29/2021    BUN 18 10/29/2021    CREATININE 1.48 10/29/2021    ANIONGAP 13 10/29/2021    ALKPHOS 252 10/21/2021    ALT 20 10/21/2021    AST 25 10/21/2021    BILITOT 0.63 10/21/2021    LABALBU 2.8 10/29/2021    LABALBU 4.0 03/30/2012    ALBUMIN 0.7 10/21/2021    LABGLOM 49 10/29/2021    GFRAA 59 10/29/2021    GFR      10/29/2021    GFR NOT REPORTED 10/29/2021    PROT 7.4 10/21/2021    CALCIUM 8.8 10/29/2021     U/A:    Lab Results   Component Value Date    COLORU Yellow 10/21/2021    TURBIDITY Clear 10/21/2021    SPECGRAV 1.011 10/21/2021    HGBUR SMALL 10/21/2021    PHUR 8.0 10/21/2021    PROTEINU NEGATIVE 10/21/2021 GLUCOSEU NEGATIVE 10/21/2021    GLUCOSEU 1+ 08/30/2011    KETUA NEGATIVE 10/21/2021    BILIRUBINUR NEGATIVE 10/21/2021    BILIRUBINUR neg 05/04/2016    BILIRUBINUR 3+ 08/30/2011    UROBILINOGEN Normal 10/21/2021    NITRU NEGATIVE 10/21/2021    LEUKOCYTESUR TRACE 10/21/2021     TSH:    Lab Results   Component Value Date    TSH 16.83 10/21/2021     Radiology:  XR CHEST (SINGLE VIEW FRONTAL)    Result Date: 10/24/2021  Stable cardiomegaly with low lung volumes and small bilateral pleural effusions. Patchy bilateral airspace disease is not significantly changed from prior. Consultations:    Consults:     Final Specialist Recommendations/Findings:   IP CONSULT TO HOSPITALIST  IP CONSULT TO INFECTIOUS DISEASES  IP CONSULT TO PULMONOLOGY  IP CONSULT TO PSYCHIATRY      The patient was seen and examined on day of discharge and this discharge summary is in conjunction with any daily progress note from day of discharge. Discharge plan:     Disposition: To a non-[facility] facility    Physician Follow Up:   - urology    Diet: diabetic diet    Activity: As tolerated    Discharge Medications:      Medication List      START taking these medications    furosemide 40 MG tablet  Commonly known as: LASIX  Take 1 tablet by mouth daily     losartan 50 MG tablet  Commonly known as: COZAAR  Take 1 tablet by mouth daily  Start taking on: October 30, 2021        CHANGE how you take these medications    clonazePAM 0.5 MG tablet  Commonly known as: KLONOPIN  Take 1 tablet by mouth 2 times daily for 3 days.   What changed:   medication strength  how much to take  when to take this  reasons to take this        CONTINUE taking these medications    aspirin 81 MG EC tablet  Take 1 tablet by mouth daily     diphenoxylate-atropine 2.5-0.025 MG per tablet  Commonly known as: LOMOTIL     DULoxetine 60 MG extended release capsule  Commonly known as: CYMBALTA     esomeprazole 40 MG delayed release capsule  Commonly known as: NEXIUM     fluocinonide 0.05 % external solution  Commonly known as: LIDEX     Januvia 100 MG tablet  Generic drug: SITagliptin     ketoconazole 2 % shampoo  Commonly known as: NIZORAL     levothyroxine 175 MCG tablet  Commonly known as: SYNTHROID     metFORMIN 500 MG tablet  Commonly known as: GLUCOPHAGE     * nystatin 121622 UNIT/GM cream  Commonly known as: MYCOSTATIN     * nystatin 433850 UNIT/GM powder  Commonly known as: MYCOSTATIN     oxybutynin 5 MG tablet  Commonly known as: DITROPAN  Take 1 tablet by mouth 2 times daily     oxyCODONE-acetaminophen 7.5-325 MG per tablet  Commonly known as: PERCOCET  Take 1 tablet by mouth every 6 hours as needed for Pain for up to 3 days. ; Dispense 30     pregabalin 150 MG capsule  Commonly known as: LYRICA     ProAir  (90 Base) MCG/ACT inhaler  Generic drug: albuterol sulfate HFA     QUEtiapine 50 MG extended release tablet  Commonly known as: SEROQUEL XR     tamsulosin 0.4 MG capsule  Commonly known as: Flomax  Take 1 capsule by mouth daily     triamcinolone 0.025 % cream  Commonly known as: KENALOG         * This list has 2 medication(s) that are the same as other medications prescribed for you. Read the directions carefully, and ask your doctor or other care provider to review them with you.             STOP taking these medications    Basaglar KwikPen 100 UNIT/ML injection pen  Generic drug: insulin glargine     Bumex 2 MG tablet  Generic drug: bumetanide     ciprofloxacin 500 MG tablet  Commonly known as: CIPRO     glipiZIDE 10 MG tablet  Commonly known as: GLUCOTROL     ibuprofen 600 MG tablet  Commonly known as: ADVIL;MOTRIN     nadolol 40 MG tablet  Commonly known as: CORGARD     venlafaxine 150 MG extended release capsule  Commonly known as: EFFEXOR XR           Where to Get Your Medications      You can get these medications from any pharmacy    Bring a paper prescription for each of these medications  clonazePAM 0.5 MG tablet  oxyCODONE-acetaminophen 7.5-325 MG per tablet     Information about where to get these medications is not yet available    Ask your nurse or doctor about these medications  furosemide 40 MG tablet  losartan 50 MG tablet         No discharge procedures on file. Time Spent on discharge is  35 mins in patient examination, evaluation, counseling as well as medication reconciliation, prescriptions for required medications, discharge plan and follow up. Electronically signed by   Britta Rachel PA-C  10/29/2021  11:32 AM      Thank you Dr. Aravind Deshpande MD for the opportunity to be involved in this patient's care.

## 2021-10-29 NOTE — DISCHARGE INSTR - COC
Continuity of Care Form    Patient Name: Valeri Jaime   :  1965  MRN:  [id number]    Admit date:  10/21/2021  Discharge date:  ***    Code Status Order: Full Code   Advance Directives:      Admitting Physician:  No admitting provider for patient encounter.   PCP: Teressa Favre, MD    Discharging Nurse: [facility] Unit/Room#: 0722/7410-19  Discharging Unit Phone Number: ***    Emergency Contact:   Extended Emergency Contact Information  Primary Emergency Contact: Charles Shrape [facility] [address] Phone: [phone]  Work Phone: [phone]  Mobile Phone: [phone]  Relation: Child  Secondary Emergency Contact: Zonia Quijano [facility] [address] Phone: [phone]  Work Phone: [phone]  Mobile Phone: [phone]  Relation: Child    Past Surgical History:  Past Surgical History:   Procedure Laterality Date    ABDOMEN SURGERY      hernia repair x4 (ventral)    COLONOSCOPY          CYSTOSCOPY  2018    CYSTOSCOPY  2019    CYSTOSCOPY N/A 2019    CYSTOSCOPY DILATION performed by Patricia Salgado MD at [place] [address] N/A 2019    CYSTOSCOPY/ DILATION NICOLE CATHETER EXCHANGE performed by Patricia Salgado MD at [facility], COLON, [facility]  2018    repair and reduction of recurrent incarcerated incisional hernia with mesh    NC CYSTOURETHROSCOPY N/A 2018    CYSTOSCOPY Marvine Likes performed by Patricia Salgado MD at [address] [facility] Bilateral 2017    FOOT [facility] with bone bx performed by Марина Collier DPM at [facility] EGD TRANSORAL BIOPSY SINGLE/MULTIPLE N/A 2017    EGD BIOPSY performed by Patrick Serna MD at [facility]  2017    polypectomy    UPPER GASTROINTESTINAL ENDOSCOPY N/A 3/14/2019    EGD BIOPSY performed by Mumtaz Myers MD at [facility] REPAIR N/A 5/20/2018    REPAIR AND REDUCTION OF RECURRENT INCARCERATED INCISIONAL HERNIA WITH MESH performed by Kimberly Perez MD at [address] [facility]       Immunization History: There is no immunization history for the selected administration types on file for this patient. Active Problems:  Patient Active Problem List   Diagnosis Code    Alcoholic cirrhosis of liver ([address] sober since 2013 K70.30    Peripheral edema R60.9    Bipolar disorder ([facility] F31.9    Type 2 diabetes mellitus with diabetic neuropathy, with long-term current use of insulin ([address] E11.40, Z79.4    Essential hypertension, currently hypotensive I10    Dyslipidemia E78.5    Weakness R53.1    Hyponatremia E30.9    Metabolic encephalopathy J49.04    FABI (obstructive sleep apnea) G47.33    Primary osteoarthritis of right knee M17.11    Type 2 diabetes mellitus with diabetic neuropathy (HCC) E11.40    Acquired hypothyroidism E03.9    Urine retention R33.9    Anemia D64.9    Cellulitis of right lower extremity L03.115    Slow transit constipation K59.01    Constipation K59.00    Iron deficiency anemia due to chronic blood loss D50.0    Gastroesophageal reflux disease without esophagitis K21.9    LEONARDA (acute kidney injury) ([address] N17.9    Secondary esophageal varices without bleeding (HCC) I85.10    Portal hypertension (HCC) K76.6    Morbid obesity with BMI of 50.0-59.9, adult (HCC) E66.01, Z68.43    Venous stasis dermatitis of both lower extremities I87.2    Cellulitis of left lower extremity L03.116    Cellulitis of perineum L03.315    Epididymoorchitis N45.3    Abdominal pain R10.9    Multiple and open wound of lower limb S81.809A    Scrotal edema N50.89    Retention, urine R33.9    SBO (small bowel obstruction) ([address] K56.609    Incisional hernia with obstruction but no gangrene K43.0    Chronic indwelling Clemons catheter Z97.8    Anxiety and depression F41.9, F32. A    Back pain, chronic M54.9, G89.29    BPH (benign prostatic hyperplasia) N40.0 Diastolic congestive heart failure (HCC) I50.30    Cirrhosis (HCC) K74.60    Diabetes mellitus ([facility] E11.9    GERD (gastroesophageal reflux disease) K21.9    H/O cardiac catheterization Z98.890    Hepatitis K75.9    Hiatal hernia K44.9    History of alcohol abuse F10.11    Hypertension I10    IBS (irritable bowel syndrome) K58.9    Morbid obesity (HCC) E66.01    Neuropathy G62.9    On home oxygen therapy Z99.81    Pancreatitis K85.90    Sleep apnea G47.30    Thyroid disease E07.9    Urinary bladder neurogenic dysfunction N31.9    Urinary tract infection associated with indwelling urethral catheter (HCC) T83.511A, N39.0    Delirium due to another medical condition F05    Musculoskeletal immobility Z74.09    Pyocystis N30.80    Myoclonic jerking G25.3    Poisoning by insulin and oral hypoglycemic (antidiabetic) drugs, accidental (unintentional), initial encounter T38.3X1A    Thrombocytopenia (HCC) D69.6    Hypotension I95.9    Klebsiella sepsis (HCC) A41.59    Septic shock (HCC) A41.9, R65.21    Urinary tract infection without hematuria N39.0       Isolation/Infection:   Isolation            Contact          Patient Infection Status       Infection Onset Added Last Indicated Last Indicated By Review Planned Expiration Resolved Resolved By    MDRO (multi-drug resistant organism) 10/14/21 10/16/21 10/14/21 Culture, Blood 1        Klebsiella urine 10/2021    Resolved    COVID-19 Rule Out 10/22/21 10/22/21 10/22/21 Respiratory Panel, Molecular, with COVID-19 (Restricted: peds pts or suitable admitted adults) (Ordered)   10/25/21 Araceli Sandy RN    COVID-19 Rule Out 10/21/21 10/21/21 10/21/21 COVID-19, Rapid (Ordered)   10/21/21 Rule-Out Test Resulted            Nurse Assessment:  Last Vital Signs: BP (!) 107/52   Pulse 70   Temp 98.8 °F (37.1 °C) (Oral)   Resp 17   Ht 5' 10\" (1.778 m)   Wt (!) 399 lb 14.6 oz (181.4 kg)   SpO2 100%   BMI 57.38 kg/m²     Last documented pain score (0-10 scale): Pain Level: 0  Last Weight:   Wt Readings from Last 1 Encounters:   10/24/21 (!) 399 lb 14.6 oz (181.4 kg)     Mental Status:  disoriented    IV Access:  - Dialysis Catheter  - site  internal jugular, insertion date: 10/25    Nursing Mobility/ADLs:  Walking   Dependent  Transfer  Dependent  Bathing  Assisted  Dressing  Dependent  Toileting  Dependent  Feeding  Assisted  Med Admin  Dependent  Med Delivery   whole    Wound Care Documentation and Therapy:  Wound 11/04/20 Leg Left; Lower; Posterior (Active)   Number of days: 359       Wound Nose Left (Active)   Number of days:         Elimination:  Continence: Bowel: No  Bladder: No  Urinary Catheter: Insertion Date: 10/21    Colostomy/Ileostomy/Ileal Conduit: No       Date of Last BM: 10/29      Intake/Output Summary (Last 24 hours) at 10/29/2021 1125  Last data filed at 10/29/2021 0800  Gross per 24 hour   Intake    Output 2350 ml   Net -2350 ml     I/O last 3 completed shifts:  In: -   Out: Brian [[id number]]    Safety Concerns: At Risk for Falls    Impairments/Disabilities:      Vision    Nutrition Therapy:  Current Nutrition Therapy:   - Oral Diet:  General    Routes of Feeding: Oral  Liquids: Thin Liquids  Daily Fluid Restriction: no    Last Modified Barium Swallow with Video (Video Swallowing Test): not done    Treatments at the Time of Hospital Discharge:   Respiratory Treatments:     Oxygen Therapy:  is not on home oxygen therapy.   Ventilator:    - No ventilator support    Rehab Therapies: Physical Therapy and Occupational Therapy  Weight Bearing Status/Restrictions: Partial weight bearing (30-50%) only on leg left leg  Other Medical Equipment (for information only, NOT a DME order):  walker  Other Treatments:       Patient's personal belongings (please select all that are sent with patient):  Dung    RN SIGNATURE:  Electronically signed by Richrd Gosselin, RN on 10/29/21 at 11:53 AM EDT    CASE MANAGEMENT/SOCIAL WORK SECTION    Inpatient Status Date: ***    Readmission Risk Assessment Score:  Readmission Risk              Risk of Unplanned Readmission:  31           Discharging to Facility/ Agency   Name:   [address] Details  FAX            [address] [facility] [address]       Phone: [phone]       Fax: [phone]            Dialysis Facility (if applicable)   Name:  Address:  Dialysis Schedule:  Phone:  Fax:    / signature: Electronically signed by Elizabeth Walker RN on 10/29/21 at 11:40 AM EDT    PHYSICIAN SECTION    Prognosis: Guarded    Condition at Discharge: Stable    Rehab Potential (if transferring to Rehab): Guarded    Recommended Labs or Other Treatments After Discharge:     Lasix, losartan held. Creatinine improved from 2.72 --> 1.48 with IV fluids. Continue to hold lasix, losartan until creatinine normalizes. Repeat BMP in 3 days. When lasix is resumed, resume at 40 mg daily. Can titrate as deemed appropriate. Physician Certification: I certify the above information and transfer of Day Nevarez  is necessary for the continuing treatment of the diagnosis listed and that he requires [facility] for greater 30 days.      Update Admission H&P: Changes in H&P as follows - Refer to discharge summary    PHYSICIAN SIGNATURE:  Electronically signed by Jose Cole DO on 10/29/21 at 3:01 PM EDT

## 2021-10-29 NOTE — PROGRESS NOTES
Physical Therapy  Facility/Department: [address] STEPDOWN  Daily Treatment Note  NAME: Day Nevarez  : 1965  MRN: [id number]    Date of Service: 10/29/2021    Discharge Recommendations:  Patient would benefit from continued therapy after discharge   PT Equipment Recommendations  Equipment Needed: No    Assessment   Body structures, Functions, Activity limitations: Decreased functional mobility ; Decreased balance;Decreased endurance;Decreased strength;Decreased cognition;Decreased safe awareness;Decreased posture  Assessment: Pt required mod-maxA for all bed mobility. Pt significantly limited by decreased cognition and level of assistance required to perform functioanl mobility. Pt would benefit from continued PT to address further mobility deficits and assist in return to prior level of independence. Prognosis: Fair  Decision Making: Medium Complexity  PT Education: Goals;PT Role;Plan of Care; Functional Mobility Training  REQUIRES PT FOLLOW UP: Yes  Activity Tolerance  Activity Tolerance: Patient limited by endurance; Patient limited by cognitive status     Patient Diagnosis(es): The primary encounter diagnosis was Pneumonia of both lungs due to infectious organism, unspecified part of lung. Diagnoses of Hypokalemia, Generalized anxiety disorder, and Chronic low back pain, unspecified back pain laterality, unspecified whether sciatica present were also pertinent to this visit.      has a past medical history of Anxiety and depression, Back pain, chronic, BPH (benign prostatic hyperplasia), CHF (congestive heart failure) ([address] Cirrhosis ([address] Diabetes mellitus ([address] GERD (gastroesophageal reflux disease), H/O cardiac catheterization, Hepatitis, Hiatal hernia, History of alcohol abuse, Hyperlipidemia, Hypertension, IBS (irritable bowel syndrome), Morbid obesity ([address] Neuropathy, On home oxygen therapy, Pancreatitis, Primary osteoarthritis of right knee, Sleep apnea, Thyroid disease, and Urinary bladder neurogenic dysfunction. has a past surgical history that includes Colonoscopy; Abdomen surgery; hernia repair; Endoscopy, colon, diagnostic; Upper gastrointestinal endoscopy (07/19/2017); pr egd transoral biopsy single/multiple (N/A, 7/19/2017); pr deep dissec foot infec,1 bursa (Bilateral, 8/22/2017); Cystoscopy (01/24/2018); pr cystourethroscopy (N/A, 1/24/2018); Incisional hernia repair (05/20/2018); ventral hernia repair (N/A, 5/20/2018); Cystocopy (02/20/2019); Cystoscopy (N/A, 2/20/2019); Upper gastrointestinal endoscopy (N/A, 3/14/2019); and Cystoscopy (N/A, 8/23/2019). Restrictions  Restrictions/Precautions  Restrictions/Precautions: Up as Tolerated, Fall Risk  Required Braces or Orthoses?: No  Position Activity Restriction  Other position/activity restrictions: 5L O2 via NC  Subjective   General  Response To Previous Treatment: Patient with no complaints from previous session. Family / Caregiver Present: No  Subjective  Subjective: RN and pt in agreement for PT treatment; pt on 4L NC upon writer's arrival, requiring frequent redirection throughout session. Pain Screening  Patient Currently in Pain: Yes  Pain Assessment  Pain Assessment:  (pt reports \"buttock pain\" but doesn't quantify a number)  Vital Signs  Patient Currently in Pain: Yes       Orientation  Orientation  Overall Orientation Status: Impaired  Orientation Level: Oriented to person;Disoriented to place; Disoriented to situation  Cognition   Cognition  Overall Cognitive Status: Exceptions  Arousal/Alertness: Delayed responses to stimuli  Following Commands: Follows one step commands with increased time; Follows one step commands consistently  Attention Span: Attends with cues to redirect; Difficulty dividing attention  Memory: Decreased recall of precautions;Decreased recall of biographical Information  Safety Judgement: Decreased awareness of need for assistance;Decreased awareness of need for safety  Problem Solving: Assistance required to generate solutions;Assistance required to correct errors made  Insights: Decreased awareness of deficits  Initiation: Requires cues for some  Sequencing: Requires cues for some  Cognition Comment: Pt required frequent redirection d/t becoming off topic very easily, provided increased time to process direction. Pt repeats words used by therapist and is easily distracted. Frequently states, \"now I'm confused\". Objective   Bed mobility  Rolling to Left: Moderate assistance  Rolling to Right: Moderate assistance  Comment: R<>L rolling performed x2 for pericare; increased time required to complete with max verbal cueing for all sequencing  Transfers  Comment: Unsafe to attempt due to level of assistance required and need for pericare  Ambulation  Ambulation?: No  More Ambulation?: No  Stairs/Curb  Stairs?: No     Balance  Comments: unable to assess this date        Supine Exercises: Ankle Pumps, Gluteal sets, Hamstring Sets, Heel Slides, Hip  Quad Sets, SLR. Reps: x10 BLE  Comments: Pt required max verbal cueing to remain on task and complete exercises with proper velocity through full ROM.            AM-PAC Score  -PAC Inpatient Mobility Raw Score : 9 (10/29/21 1215)  -PAC Inpatient T-Scale Score : 30.55 (10/29/21 1215)  Mobility Inpatient CMS 0-100% Score: 81.38 (10/29/21 1215)  Mobility Inpatient CMS G-Code Modifier : CM (10/29/21 1215)       Goals  Short term goals  Time Frame for Short term goals: 14 visits  Short term goal 1: Perform bed mobility with Min A  Short term goal 2: Perform sit to stand with CGA  Short term goal 3: Ambulate 100ft with appropriate AD and CGA  Short term goal 4: Demo Fair dynamic standing balance to decrease risk of falls  Short term goal 5: Participate in 30 minutes of therapy to demo increased endurance    Plan    Plan  Times per week: 5-6x/wk  Current Treatment Recommendations: Strengthening, ROM, Balance Training, Functional Mobility Training, Transfer Training, Gait Training,

## 2021-10-29 NOTE — CARE COORDINATION
Transitional Planning    1010 Spoke with Boris People at [facility] who confirms pt can come back today if discharged    [address] [facility] confirms transport time 1746    [phone] [facility] calls and asks if pt can be transferred earlier, requesting 1500.  [facility] called and message left for update  time    1447 left message for Josselin Haywood, son, updating on transport to [facility]

## 2021-10-29 NOTE — PLAN OF CARE
Problem: Pain:  Goal: Pain level will decrease  Description: Pain level will decrease  10/29/2021 1512 by Megan Cta RN  Outcome: Completed  10/29/2021 1113 by Megan Cat RN  Outcome: Ongoing  Goal: Control of acute pain  Description: Control of acute pain  10/29/2021 1512 by Megan Cat RN  Outcome: Completed  10/29/2021 1113 by Megan Cat RN  Outcome: Ongoing  Goal: Control of chronic pain  Description: Control of chronic pain  10/29/2021 1512 by Megan Cat RN  Outcome: Completed  10/29/2021 1113 by Megan Cat RN  Outcome: Ongoing     Problem: Falls - Risk of:  Goal: Will remain free from falls  Description: Will remain free from falls  10/29/2021 1512 by Megan Cat RN  Outcome: Completed  10/29/2021 1113 by Megan Cat RN  Outcome: Ongoing  Goal: Absence of physical injury  Description: Absence of physical injury  10/29/2021 1512 by Megan Cat RN  Outcome: Completed  10/29/2021 1113 by Megan Cat RN  Outcome: Ongoing     Problem: Skin Integrity:  Goal: Will show no infection signs and symptoms  Description: Will show no infection signs and symptoms  10/29/2021 1512 by Megan Cat RN  Outcome: Completed  10/29/2021 1113 by Megan Cat RN  Outcome: Ongoing  Goal: Absence of new skin breakdown  Description: Absence of new skin breakdown  10/29/2021 1512 by Megan Cat RN  Outcome: Completed  10/29/2021 1113 by Megan Cat RN  Outcome: Ongoing     Problem: Urinary Elimination:  Goal: Signs and symptoms of infection will decrease  Description: Signs and symptoms of infection will decrease  10/29/2021 1512 by Megan Cat RN  Outcome: Completed  10/29/2021 1113 by Megan Cat RN  Outcome: Ongoing  Goal: Complications related to the disease process, condition or treatment will be avoided or minimized  Description: Complications related to the disease process, condition or treatment will be avoided or minimized  10/29/2021 1512 by Megan Cat RN  Outcome: Completed  10/29/2021 1113 by Liset Sena, RN  Outcome: Ongoing

## 2021-10-29 NOTE — PROGRESS NOTES
[facility]  Office: [address], DO, Cm Blue, DO, Mandy Hall, DO, Parul Anderson, DO, Joanne Lopez MD, Noemi Taveras MD, Jayde Joseph MD, Figueroa Lopes MD, Essie aWrren MD, Kathy Weaver MD, Gil Person MD, Peña Arita MD, Paulina Zurita, DO, Abraham Ashraf DO, Chayo Spence MD,  Alessandra Ambrose DO, Alise Ribeiro MD, Jennifer Mehta MD, Thais Maldonado MD, Liz Harris MD, John Deluna MD, Kieran Simpson MD, Cm Singleton, [employer], [facility], CNP, Andrew Avilez, CNP, Mojgan Lowe, CNS, Tutu Zarco, CNP, Brittney Situ, Jean Claude Mean, CNP, Thanh Grace, CNP, Alber Seea, CNP, Shawn Valdivia, CNP, Qaun Neumann PA-C, Arlene Roberto, [facility], [facility], [employer], Camryn Sang, CNP, Nicholas Cancel, CNP, Imtiaz Car, CNP, Lc Fermo, CNP, Rakel Distel, CNP, Daryl De La O, [address] [place] [address]    Progress Note    10/29/2021    10:54 AM    Name:   Jeni Pop  MRN:     [id number]     Acct:      [de-identified]   Room:   [address] [place] [address] Day:  7  Admit Date:  10/21/2021  9:44 PM    PCP:   Pushpa Alberto MD  Code Status:  Full Code    Subjective:     C/C:   Chief Complaint   Patient presents with    Shortness of Breath    Fever     Interval History Status: not changed. Pt seen and evaluated this a.m. He is much improved from a mentation standpoint. Currently, he has no new complaints. He is stable for discharge back to SNF. Brief History:     From H&P  Becky Montana a 61 y.o. Non- / non  male who presents with Shortness of Breath and Fever   and is admitted to the hospital for the management of Pneumonia.   25-year-old male past medical history of urinary tract infection, type 2 diabetes, hyperlipidemia, prior alcohol abuse, depression, hypothyroidism, morbid obesity, bipolar disorder, anxiety, obstructive sleep apnea, venous insufficiency of bilateral lower extremities, history of alcoholic cirrhosis, history of portal hypertension, chronic dependence of supplemental oxygen, diastolic heart failure presents with shortness of breath with concern for pneumonia seen on chest x-ray at outlying facility.  Patient was transported to [facility] for further care.  Of note patient was recently discharged from another [facility] due to urinary tract infection and was discharged on cipro.       Review of Systems:     Constitutional:  negative for chills, fevers, sweats  Respiratory:  negative for cough, dyspnea on exertion, shortness of breath, wheezing  Cardiovascular:  negative for chest pain, chest pressure/discomfort, lower extremity edema, palpitations  Gastrointestinal:  negative for abdominal pain, constipation, diarrhea, nausea, vomiting  Neurological:  negative for dizziness, headache    Medications: Allergies:     Allergies   Allergen Reactions    No Known Allergies        Current Meds:   Scheduled Meds:    clonazePAM  0.5 mg Oral BID    pregabalin  100 mg Oral BID    QUEtiapine  50 mg Oral Nightly    [Held by provider] furosemide  40 mg Oral BID    influenza virus vaccine  0.5 mL IntraMUSCular Prior to discharge    potassium bicarbonate  50 mEq Oral BID    [Held by provider] losartan  50 mg Oral Daily    oxybutynin  5 mg Oral BID    [Held by provider] carvedilol  3.125 mg Oral BID WC    enoxaparin  40 mg SubCUTAneous BID    sodium chloride flush  5-40 mL IntraVENous 2 times per day    insulin lispro  0-6 Units SubCUTAneous TID WC    insulin lispro  0-3 Units SubCUTAneous Nightly    DULoxetine  60 mg Oral QAM    aspirin  81 mg Oral Daily    levothyroxine  175 mcg Oral Daily    tamsulosin  0.4 mg Oral Daily    pantoprazole  40 mg Oral QAM AC     Continuous Infusions:    sodium chloride 25 mL (10/26/21 0651)    dextrose       PRN Meds: oxyCODONE-acetaminophen, diphenhydrAMINE, potassium chloride **OR** potassium alternative oral replacement **OR** potassium chloride, melatonin, albuterol, sodium chloride flush, sodium chloride, ondansetron **OR** ondansetron, magnesium hydroxide, acetaminophen **OR** acetaminophen, glucose, dextrose, glucagon (rDNA), dextrose, albuterol sulfate HFA    Data:     Past Medical History:   has a past medical history of Anxiety and depression, Back pain, chronic, BPH (benign prostatic hyperplasia), CHF (congestive heart failure) ([facility] Cirrhosis ([facility] Diabetes mellitus ([facility] GERD (gastroesophageal reflux disease), H/O cardiac catheterization, Hepatitis, Hiatal hernia, History of alcohol abuse, Hyperlipidemia, Hypertension, IBS (irritable bowel syndrome), Morbid obesity ([facility] Neuropathy, On home oxygen therapy, Pancreatitis, Primary osteoarthritis of right knee, Sleep apnea, Thyroid disease, and Urinary bladder neurogenic dysfunction. Social History:   reports that he has never smoked. He has never used smokeless tobacco. He reports that he does not drink alcohol and does not use drugs. Family History:   Family History   Adopted: Yes       Vitals:  BP (!) 120/55   Pulse 62   Temp 98.6 °F (37 °C) (Oral)   Resp 17   Ht 5' 10\" (1.778 m)   Wt (!) 399 lb 14.6 oz (181.4 kg)   SpO2 100%   BMI 57.38 kg/m²   Temp (24hrs), Av.8 °F (36.6 °C), Min:96.4 °F (35.8 °C), Max:98.6 °F (37 °C)    Recent Labs     10/28/21  0821 10/28/21  1726 10/28/21  2109 10/29/21  0708   POCGLU 99 115* 100 103       I/O (24Hr):     Intake/Output Summary (Last 24 hours) at 10/29/2021 1054  Last data filed at 10/29/2021 0800  Gross per 24 hour   Intake    Output 2350 ml   Net -2350 ml       Labs:  Hematology:  Recent Labs     10/27/21  0623 10/28/21  0640 10/29/21  0621   WBC 9.5 7.5 4.8   RBC 3.85* 3.67* 3.57*   HGB 9.8* 9.5* 8.9*   HCT 32.5* 31.9* 31.6*   MCV 84.4 86.9 88.5   MCH 25.5 25.9 24.9*   MCHC 30.2 29.8 28.2*   RDW 15.5* 15.7* 15.3*    148 138   MPV 11.2 11.1 11.0     Chemistry:  Recent Labs     10/27/21  0623 10/28/21  0640 bilaterally, differential considerations include worsening edema versus infection 3. Small right pleural effusion     CT CHEST PULMONARY EMBOLISM W CONTRAST    Result Date: 10/22/2021  1. Suboptimal evaluation of the subsegmental and more peripheral pulmonary arteries due to motion artifact. Given this, no obvious acute pulmonary thromboembolic disease. Prominent main pulmonary artery suggests pulmonary hypertension. 2.  Multifocal bilateral heterogeneous pulmonary opacities. Findings may represent a combination of atelectasis and pulmonary edema. Multifocal pneumonia is an additional consideration. 3.  Small bilateral pleural effusions with associated lower lobe relaxation atelectasis. 4.  Round 2.5 cm nodule in the subcutaneous soft tissues posterior to the left nipple demonstrates simple fluid attenuation and may represent a benign cyst.  This could be further evaluated with focused ultrasound as warranted.        Physical Examination:        General appearance:  alert, cooperative and no distress  Mental Status:  oriented to person, place and time and normal affect  Lungs:  clear to auscultation bilaterally, normal effort  Heart:  regular rate and rhythm, no murmur  Abdomen:  soft, nontender, nondistended, normal bowel sounds, no masses, hepatomegaly, splenomegaly  Extremities:  no edema, redness, tenderness in the calves  Skin: suprapubic skin folds and groin with erythematous, raised eruption consistent with fungal infection   Psych: anxious, restless    Assessment:        Hospital Problems         Last Modified POA    * (Principal) Acute on chronic diastolic congestive heart failure ([address] 10/24/2021 Yes    Overview Signed 10/14/2017  7:34 PM by [address] [facility]     Updating Deprecated Diagnoses         Alcoholic cirrhosis of liver ([address] sober since 2013 10/22/2021 Yes    Bipolar disorder ([address] 10/22/2021 Yes    Type 2 diabetes mellitus with diabetic neuropathy, with long-term current use of insulin ([address] 10/22/2021 Yes    FABI (obstructive sleep apnea) 10/22/2021 Yes    Primary osteoarthritis of right knee (Chronic) 10/22/2021 Yes    Type 2 diabetes mellitus with diabetic neuropathy ([address] (Chronic) 10/22/2021 Yes    Acquired hypothyroidism 10/22/2021 Yes    Urine retention 10/22/2021 Yes    Portal hypertension ([address] (Chronic) 10/22/2021 Yes    Venous stasis dermatitis of both lower extremities (Chronic) 10/22/2021 Yes    Chronic indwelling Wells catheter (Chronic) 10/22/2021 Yes    Anxiety and depression 10/22/2021 Yes    BPH (benign prostatic hyperplasia) 10/22/2021 Yes    Morbid obesity ([address] 10/22/2021 Yes    On home oxygen therapy 10/22/2021 Yes    Overview Signed 11/4/2020  2:08 PM by SUNSHINE Zuniga - NP     prn         Pneumonia 10/24/2021 Yes          Plan:        Acute respiratory failure with hypoxia- Improved. secondary to pulmonary edema. Diuresed over 10L. Pt developed LEONARDA following diuresis. Lasix held. LEONARDA - from over diuresis, ischemic ATN secondary to hypotension. Lasix, losartan held. Creatinine improved from 2.72 --> 1.48 with IV fluids. Continue to hold lasix, losartan until creatinine normalizes. Repeat BMP in 3 days. When lasix is resumed, resume at 40 mg daily. Can titrate as deemed appropriate. Essential hypertension Hold Coreg, Cozaar with hypotension/LEONARDA. Hypothyroidismcontinue Synthroid  Urinary retention f/u outpatient, chronic urinary retention, continue Flomax. Wells exchanged on 10/21. Exchange wells every 2-3 weeks. Follow-up with OP urologist.  Type 2 diabetes glucose well controlled. Resume home regimen  ? Bicuspid aortic valve, mild aortic stenosis: PCP to continue to monitor. Consider cardiology referral if pt becoming symptomatic  Super morbid obesity  FABI  Bipolar disorder: clonopin reduced to 0.5 mg bid following psych evaluation. Continue seroquel 50 mg nightly. Duloxetine 60 mg every morning.     Leigh Schaumann, PA-C  10/29/2021  10:54 AM

## 2021-10-29 NOTE — PROGRESS NOTES
PULMONARY & CRITICAL CARE MEDICINE PROGRESS NOTE     Patient:  Michelle Kaufman  MRN: [id number]  Admit date: 10/21/2021  Primary Care Physician: Heath Meza MD  Consulting Physician: Dorothea Garcia DO    HISTORY     CHIEF COMPLAINT/REASON FOR CONSULT: SOB    HISTORY OF PRESENT ILLNESS:  The patient is a 61 y.o. male care with complaint of shortness of breath and fever. Patient transferred from [facility] after treating with antibiotics for pneumonia. Poor historian, delayed responses. However alert oriented x3. Stated he has been sick for the past few days, shortness of breath, low-grade fevers. Denies any sick contacts. Lives with his son. Uses wheelchair for ambulation, able to take care of himself. Patient received [employer] code vaccine. Not received flu shot. Denies any smoking history. Never diagnosed with any COPD. Uses 34L oxygen at home. Not using any CPAP at home. Denies chest pain, urinary symptoms, nausea, vomiting, headache. Significant history of T2DM, hyperlipidemia, recent UTI, alcohol abuse, depression, hypothyroidism, morbid obesity, FABI, bilateral lower extremity venous insufficiency, alcohol cirrhosis, hypertension, diastolic heart failure on oxygen. CT PE negative for PE, however suboptimal evaluation. Showing pulmonary hypertension, multifocal opacities concerning for atelectasis/pneumonia. CT abdomen showing liver cirrhosis with splenomegaly. Echo with severe LVH in 2014. CT PE concerning for pulmonary hypertension. Morbid obesity. History of MDRO.     INTERVAL HISTORY:  Vitals stable, labs reviewed  Using bipap, afebrile  Alert, oriented x2,  Recovering renal function, no leukocytosis      PAST MEDICAL HISTORY:        Diagnosis Date    Anxiety and depression     Back pain, chronic     BPH (benign prostatic hyperplasia)     CHF (congestive heart failure) ([address]     [facility]     Diabetes mellitus ([facility]     not checking bloodsugar at home   Roula Simons GERD (gastroesophageal reflux disease)     H/O cardiac catheterization not sure of date    no stents    Hepatitis     Pt does not know the type.  Hiatal hernia     History of alcohol abuse     Sober since 2013    Hyperlipidemia     not taking any medication    Hypertension     IBS (irritable bowel syndrome)     Morbid obesity ([address]     Neuropathy     feet,toes    On home oxygen therapy     DME for home oxgygen at 2.5 lpm at HS and as needed    Pancreatitis     x 5 episodes    Primary osteoarthritis of right knee     Sleep apnea     suspected juany, never has had PSG study.   HAS NO MACHINE    Thyroid disease     Urinary bladder neurogenic dysfunction      PAST SURGICAL HISTORY:        Procedure Laterality Date    ABDOMEN SURGERY      hernia repair x4 (ventral)    COLONOSCOPY      2012    CYSTOSCOPY  01/24/2018    CYSTOSCOPY  02/20/2019    CYSTOSCOPY N/A 2/20/2019    CYSTOSCOPY DILATION performed by Amanda Wayne MD at [address] [place] [address] 8/23/2019    CYSTOSCOPY/ DILATION NICOLE CATHETER EXCHANGE performed by Amanda Wayne MD at [address] [facility], COLON, DIAGNOSTIC     [address]  05/20/2018    repair and reduction of recurrent incarcerated incisional hernia with mesh    IN CYSTOURETHROSCOPY N/A 1/24/2018    CYSTOSCOPY Taran See performed by Amanda Wayne MD at [address] Bilateral 8/22/2017    FOOT [address] with bone bx performed by Figueroa Ceballos DPM at [address] [facility] EGD TRANSORAL BIOPSY SINGLE/MULTIPLE N/A 7/19/2017    EGD BIOPSY performed by Mini Worley MD at [facility]  07/19/2017    polypectomy    UPPER GASTROINTESTINAL ENDOSCOPY N/A 3/14/2019    EGD BIOPSY performed by Sammi Ritter MD at [facility] N/A 5/20/2018    REPAIR AND REDUCTION OF RECURRENT INCARCERATED INCISIONAL HERNIA WITH MESH performed by Martine Acosta MD at [facility]:       Adopted: Yes     SOCIAL HISTORY:   TOBACCO:   reports that he has never smoked. He has never used smokeless tobacco.  ETOH:  reports no history of alcohol use. DRUGS: reports no history of drug use. AVOCATION/OCCUPATIONAL EXPOSURE:    The patient denies asbestos, silica dust, coal, foundry, quarry or [employer] exposure. The patient denies  to having pet dogs, cats, turtles or exotic birds at home. There is no history of TB or TB exposure. There is no exposure to sick contacts. Travel history is not significant history of risk factors for pulmonary disease. The patient denies using [employer]. ALLERGIES:    Allergies   Allergen Reactions    No Known Allergies          HOME MEDICATIONS:  Prior to Admission medications    Medication Sig Start Date End Date Taking? Authorizing Provider   clonazePAM (KLONOPIN) 0.5 MG tablet Take 1 tablet by mouth 2 times daily for 3 days. 10/29/21 11/1/21 Yes Leigh Schaumann, PA-C   furosemide (LASIX) 40 MG tablet Take 1 tablet by mouth daily 10/29/21 11/28/21 Yes Leigh Schaumann, PA-C   losartan (COZAAR) 50 MG tablet Take 1 tablet by mouth daily 10/30/21  Yes Leigh Schaumann, PA-C   oxyCODONE-acetaminophen (PERCOCET) 7.5-325 MG per tablet Take 1 tablet by mouth every 6 hours as needed for Pain for up to 3 days. ; Dispense 30 10/29/21 11/1/21 Yes Elvie Gavin PA-C   pregabalin (LYRICA) 150 MG capsule Take 150 mg by mouth 2 times daily.     Historical Provider, MD   SITagliptin (JANUVIA) 100 MG tablet Take 100 mg by mouth daily    Historical Provider, MD   metFORMIN (GLUCOPHAGE) 500 MG tablet Take 500 mg by mouth 2 times daily (with meals)    Historical Provider, MD   nystatin (MYCOSTATIN) 806671 UNIT/GM cream Apply topically 2 times daily as needed (to skin folds)    Historical Provider, MD   QUEtiapine (SEROQUEL XR) 50 MG extended release tablet Take 50 mg by mouth nightly    Historical Provider, MD   triamcinolone (KENALOG) 0.025 % cream Apply topically 2 times daily as needed    Historical Provider, MD   diphenoxylate-atropine (LOMOTIL) 2.5-0.025 MG per tablet Take 1 tablet by mouth 4 times daily as needed for Diarrhea. Historical Provider, MD   fluocinonide (LIDEX) 0.05 % external solution Apply topically as needed (scalp itching)    Historical Provider, MD   ketoconazole (NIZORAL) 2 % shampoo Apply topically Twice a Week    Historical Provider, MD   albuterol sulfate HFA (PROAIR HFA) 108 (90 Base) MCG/ACT inhaler Inhale 2 puffs into the lungs every 6 hours as needed for Wheezing    Historical Provider, MD   tamsulosin (FLOMAX) 0.4 MG capsule Take 1 capsule by mouth daily 18   Jenise Osorio MD   oxybutynin (DITROPAN) 5 MG tablet Take 1 tablet by mouth 2 times daily 17   Trang Moreno,    aspirin 81 MG EC tablet Take 1 tablet by mouth daily 17   Olya Muñoz DO   levothyroxine (SYNTHROID) 175 MCG tablet Take 175 mcg by mouth Daily     Historical Provider, MD   nystatin (MYCOSTATIN) 316707 UNIT/GM powder Apply topically 2 times daily as needed TO ABDOMINAL FOLDS, [address]     Historical Provider, MD   esomeprazole (NEXIUM) 40 MG capsule Take 40 mg by mouth 2 times daily     Historical Provider, MD   DULoxetine (CYMBALTA) 60 MG capsule Take 60 mg by mouth every morning     Historical Provider, MD     IMMUNIZATIONS:  There is no immunization history for the selected administration types on file for this patient.     REVIEW OF SYSTEMS:  Unable to obtain due to drowsiness    PHYSICAL EXAMINATION     VITAL SIGNS:   LAST-  BP (!) 107/52   Pulse 70   Temp 98.8 °F (37.1 °C) (Oral)   Resp 17   Ht 5' 10\" (1.778 m)   Wt (!) 399 lb 14.6 oz (181.4 kg)   SpO2 100%   BMI 57.38 kg/m²   8-24 HR RANGE-  TEMP Temp  Av °F (36.7 °C)  Min: 96.4 °F (35.8 °C)  Max: 98.8 °F (95.7 °C)   BP Systolic (64TYU), LLZ:566 , Min:98 , WDQ:271      Diastolic (64UWR), GWV:64, Min:45, Max:65     PULSE Pulse  Av.8  Min: 62  Max: 73   RR Resp  Avg: 15.8  Min: 11  Max: 22   O2 SAT SpO2  Av %  Min: 96 %  Max: 100 %   OXYGEN DELIVERY No data recorded     SYSTEMIC EXAMINATION:   General appearance -more alert today, morbidly obese   Mental status oriented x2, restlessness improved, hallucinations present, remote memory preserved  Eyes - pupils equal and reactive, extraocular eye movements intact, sclera anicteric  Ears - not examined  Nose - normal and patent, no erythema, discharge  Mouth - mucous membranes moist, pharynx normal without lesions  Neck - supple, no significant adenopathy  Chest -limited exam due to body habitus. Decreased breath sounds throughout. No wheezing, no rales. Heart - normal rate, regular rhythm, normal S1, S2, no murmurs, rubs, clicks or gallops  Abdomen - soft, nontender, nondistended, no masses or organomegaly  Neurological - motor and sensory grossly normal bilaterally  Musculoskeletal - no joint tenderness, deformity or swelling  Extremities - peripheral pulses normal, pitting and nonpitting edema, dermatitis changes on lower extremity.       DATA REVIEW     Medications: Current Inpatient  Scheduled Meds:   clonazePAM  0.5 mg Oral BID    pregabalin  100 mg Oral BID    QUEtiapine  50 mg Oral Nightly    [Held by provider] furosemide  40 mg Oral BID    influenza virus vaccine  0.5 mL IntraMUSCular Prior to discharge    potassium bicarbonate  50 mEq Oral BID    [Held by provider] losartan  50 mg Oral Daily    oxybutynin  5 mg Oral BID    [Held by provider] carvedilol  3.125 mg Oral BID WC    enoxaparin  40 mg SubCUTAneous BID    sodium chloride flush  5-40 mL IntraVENous 2 times per day    insulin lispro  0-6 Units SubCUTAneous TID     insulin lispro  0-3 Units SubCUTAneous Nightly    DULoxetine  60 mg Oral QAM    aspirin  81 mg Oral Daily    levothyroxine  175 mcg Oral Daily    tamsulosin  0.4 mg Oral Daily    pantoprazole  40 mg Oral QAM AC     Continuous Infusions:   sodium chloride 25 mL (10/26/21 0651)    dextrose       INPUT/OUTPUT:  In: -   Out: 2350 [Urine:2350]    LABS:-  ABGs:   No results found for: PH, PCO2, PO2, HCO3, O2SAT  No results for input(s): PHART, PO2ART, YOB6MVT, WAF4PMR, BEART, M7UMBOCF in the last 72 hours. Lab Results   Component Value Date    POCPH 7.415 10/28/2021    POCPCO2 49.9 (H) 10/28/2021    POCPO2 162.3 (H) 10/28/2021    POCHCO3 32.0 (H) 10/28/2021    TXGD1FCL 100 (H) 10/28/2021     CBC:   Recent Labs     10/27/21  0623 10/28/21  0640 10/29/21  0621   WBC 9.5 7.5 4.8   HGB 9.8* 9.5* 8.9*   HCT 32.5* 31.9* 31.6*   MCV 84.4 86.9 88.5    148 138   RBC 3.85* 3.67* 3.57*   MCH 25.5 25.9 24.9*   MCHC 30.2 29.8 28.2*   RDW 15.5* 15.7* 15.3*     BMP:   Recent Labs     10/27/21  0623 10/28/21  0640 10/29/21  0621    142 142   K 3.7 3.6* 4.0   CL 97* 98 101   CO2 26 29 28   BUN 15 23* 18   CREATININE 1.49* 2.72* 1.48*   GLUCOSE 152* 109* 116*   PHOS 4.1 5.3* 3.3     Liver Function Test:   Recent Labs     10/29/21  0621   LABALBU 2.8*     Amylase/Lipase:  No results for input(s): AMYLASE, LIPASE in the last 72 hours. Coagulation Profile:   No results for input(s): INR, PROTIME, APTT in the last 72 hours. Cardiac Enzymes:  No results for input(s): CKTOTAL, CKMB, CKMBINDEX, TROPONINI in the last 72 hours.   Lactic Acid:  Lab Results   Component Value Date    LACTA 2.0 11/06/2020    LACTA 1.9 11/04/2020    LACTA 0.8 12/07/2017     BNP:   Lab Results   Component Value Date    BNP NOT REPORTED 06/18/2014    BNP NOT REPORTED 06/17/2014    BNP 8 07/07/2012     D-Dimer:  Lab Results   Component Value Date    DDIMER 2.49 05/04/2016     Others:   Lab Results   Component Value Date    TSH 16.83 (H) 10/21/2021    U9WHTLB 5.7 06/22/2014     Lab Results   Component Value Date    MARISA NEGATIVE 06/19/2014    SEDRATE 77 (H) 11/06/2020    CRP 42.3 (H) 11/06/2020     No results found for: Colie Finical  Lab Results   Component Value Date IRON 29 (L) 05/07/2016    TIBC 291 05/07/2016    FERRITIN 109 05/07/2016     No results found for: SPEP, UPEP  No results found for: PSA, CEA, , LV4835,   Microbiology:    Pathology:    Radiology Reports:  XR CHEST (SINGLE VIEW FRONTAL)   Final Result   Stable cardiomegaly with low lung volumes and small bilateral pleural   effusions. Patchy bilateral airspace disease is not significantly changed   from prior. CT HEAD WO CONTRAST   Final Result   No acute intracranial abnormality. MRI may be obtained if clinically   indicated. CT CHEST PULMONARY EMBOLISM W CONTRAST   Final Result   1. Suboptimal evaluation of the subsegmental and more peripheral pulmonary   arteries due to motion artifact. Given this, no obvious acute pulmonary   thromboembolic disease. Prominent main pulmonary artery suggests pulmonary   hypertension. 2.  Multifocal bilateral heterogeneous pulmonary opacities. Findings may   represent a combination of atelectasis and pulmonary edema. Multifocal   pneumonia is an additional consideration. 3.  Small bilateral pleural effusions with associated lower lobe relaxation   atelectasis. 4.  Round 2.5 cm nodule in the subcutaneous soft tissues posterior to the   left nipple demonstrates simple fluid attenuation and may represent a benign   cyst.  This could be further evaluated with focused ultrasound as warranted. CT ABDOMEN PELVIS W IV CONTRAST Additional Contrast? None   Final Result   Nonspecific presacral stranding. Otherwise no evidence of acute infectious   or inflammatory process in the abdomen or pelvis. Cirrhosis with splenomegaly. XR CHEST PORTABLE   Final Result   1. Cardiomegaly   2. Interval worsening of the interstitial alveolar opacities bilaterally,   differential considerations include worsening edema versus infection   3.  Small right pleural effusion             Pulmonary Function

## 2021-11-02 ENCOUNTER — APPOINTMENT (OUTPATIENT)
Dept: GENERAL RADIOLOGY | Age: 56
DRG: 683 | End: 2021-11-02
Payer: MEDICARE

## 2021-11-02 ENCOUNTER — HOSPITAL ENCOUNTER (INPATIENT)
Age: 56
LOS: 2 days | Discharge: NURSING, REHAB OR LONG-TERM CARE | DRG: 683 | End: 2021-11-04
Attending: EMERGENCY MEDICINE | Admitting: INTERNAL MEDICINE
Payer: MEDICARE

## 2021-11-02 DIAGNOSIS — M17.11 PRIMARY OSTEOARTHRITIS OF RIGHT KNEE: Chronic | ICD-10-CM

## 2021-11-02 DIAGNOSIS — N17.9 ACUTE KIDNEY INJURY SUPERIMPOSED ON CHRONIC KIDNEY DISEASE (HCC): ICD-10-CM

## 2021-11-02 DIAGNOSIS — N18.9 ACUTE KIDNEY INJURY SUPERIMPOSED ON CHRONIC KIDNEY DISEASE (HCC): ICD-10-CM

## 2021-11-02 DIAGNOSIS — N18.9 ACUTE KIDNEY INJURY SUPERIMPOSED ON CKD (HCC): Primary | ICD-10-CM

## 2021-11-02 DIAGNOSIS — N17.9 ACUTE KIDNEY INJURY SUPERIMPOSED ON CKD (HCC): Primary | ICD-10-CM

## 2021-11-02 DIAGNOSIS — I95.9 HYPOTENSION, UNSPECIFIED HYPOTENSION TYPE: ICD-10-CM

## 2021-11-02 DIAGNOSIS — F41.1 GENERALIZED ANXIETY DISORDER: ICD-10-CM

## 2021-11-02 LAB
-: ABNORMAL
ABSOLUTE EOS #: 0.22 K/UL (ref 0–0.44)
ABSOLUTE IMMATURE GRANULOCYTE: 0.03 K/UL (ref 0–0.3)
ABSOLUTE LYMPH #: 1.11 K/UL (ref 1.1–3.7)
ABSOLUTE MONO #: 0.6 K/UL (ref 0.1–1.2)
ALBUMIN SERPL-MCNC: 2.5 G/DL (ref 3.5–5.2)
ALBUMIN SERPL-MCNC: 2.9 G/DL (ref 3.5–5.2)
ALBUMIN/GLOBULIN RATIO: 0.7 (ref 1–2.5)
ALBUMIN/GLOBULIN RATIO: 0.9 (ref 1–2.5)
ALP BLD-CCNC: 128 U/L (ref 40–129)
ALP BLD-CCNC: 158 U/L (ref 40–129)
ALT SERPL-CCNC: 11 U/L (ref 5–41)
ALT SERPL-CCNC: 13 U/L (ref 5–41)
AMORPHOUS: ABNORMAL
ANION GAP SERPL CALCULATED.3IONS-SCNC: 13 MMOL/L (ref 9–17)
AST SERPL-CCNC: 18 U/L
AST SERPL-CCNC: 19 U/L
BACTERIA: ABNORMAL
BASOPHILS # BLD: 1 % (ref 0–2)
BASOPHILS ABSOLUTE: 0.04 K/UL (ref 0–0.2)
BILIRUB SERPL-MCNC: 0.35 MG/DL (ref 0.3–1.2)
BILIRUB SERPL-MCNC: 0.45 MG/DL (ref 0.3–1.2)
BILIRUBIN DIRECT: 0.13 MG/DL
BILIRUBIN DIRECT: 0.2 MG/DL
BILIRUBIN URINE: NEGATIVE
BILIRUBIN, INDIRECT: 0.22 MG/DL (ref 0–1)
BILIRUBIN, INDIRECT: 0.25 MG/DL (ref 0–1)
BNP INTERPRETATION: ABNORMAL
BUN BLDV-MCNC: 26 MG/DL (ref 6–20)
BUN/CREAT BLD: ABNORMAL (ref 9–20)
CALCIUM SERPL-MCNC: 8.6 MG/DL (ref 8.6–10.4)
CASTS UA: ABNORMAL /LPF (ref 0–2)
CASTS UA: ABNORMAL /LPF (ref 0–2)
CHLORIDE BLD-SCNC: 92 MMOL/L (ref 98–107)
CO2: 25 MMOL/L (ref 20–31)
COLOR: ABNORMAL
COMMENT UA: ABNORMAL
CREAT SERPL-MCNC: 4.62 MG/DL (ref 0.7–1.2)
CRYSTALS, UA: ABNORMAL /HPF
DIFFERENTIAL TYPE: ABNORMAL
EOSINOPHILS RELATIVE PERCENT: 4 % (ref 1–4)
EPITHELIAL CELLS UA: ABNORMAL /HPF (ref 0–5)
GFR AFRICAN AMERICAN: 16 ML/MIN
GFR NON-AFRICAN AMERICAN: 13 ML/MIN
GFR SERPL CREATININE-BSD FRML MDRD: ABNORMAL ML/MIN/{1.73_M2}
GFR SERPL CREATININE-BSD FRML MDRD: ABNORMAL ML/MIN/{1.73_M2}
GLOBULIN: ABNORMAL G/DL (ref 1.5–3.8)
GLOBULIN: ABNORMAL G/DL (ref 1.5–3.8)
GLUCOSE BLD-MCNC: 134 MG/DL (ref 70–99)
GLUCOSE URINE: NEGATIVE
HCT VFR BLD CALC: 33.3 % (ref 40.7–50.3)
HEMOGLOBIN: 9.5 G/DL (ref 13–17)
IMMATURE GRANULOCYTES: 1 %
INR BLD: 1
KETONES, URINE: ABNORMAL
LACTIC ACID, WHOLE BLOOD: 3.1 MMOL/L (ref 0.7–2.1)
LEUKOCYTE ESTERASE, URINE: ABNORMAL
LYMPHOCYTES # BLD: 19 % (ref 24–43)
MCH RBC QN AUTO: 24.9 PG (ref 25.2–33.5)
MCHC RBC AUTO-ENTMCNC: 28.5 G/DL (ref 28.4–34.8)
MCV RBC AUTO: 87.4 FL (ref 82.6–102.9)
MONOCYTES # BLD: 10 % (ref 3–12)
MUCUS: ABNORMAL
NITRITE, URINE: NEGATIVE
NRBC AUTOMATED: 0 PER 100 WBC
OTHER OBSERVATIONS UA: ABNORMAL
PARTIAL THROMBOPLASTIN TIME: 25.5 SEC (ref 20.5–30.5)
PDW BLD-RTO: 16.2 % (ref 11.8–14.4)
PH UA: 5 (ref 5–8)
PLATELET # BLD: 216 K/UL (ref 138–453)
PLATELET ESTIMATE: ABNORMAL
PMV BLD AUTO: 11.6 FL (ref 8.1–13.5)
POTASSIUM SERPL-SCNC: 4.6 MMOL/L (ref 3.7–5.3)
PRO-BNP: 2339 PG/ML
PROTEIN UA: ABNORMAL
PROTHROMBIN TIME: 10.9 SEC (ref 9.1–12.3)
RBC # BLD: 3.81 M/UL (ref 4.21–5.77)
RBC # BLD: ABNORMAL 10*6/UL
RBC UA: ABNORMAL /HPF (ref 0–2)
RENAL EPITHELIAL, UA: ABNORMAL /HPF
SEG NEUTROPHILS: 66 % (ref 36–65)
SEGMENTED NEUTROPHILS ABSOLUTE COUNT: 3.95 K/UL (ref 1.5–8.1)
SODIUM BLD-SCNC: 130 MMOL/L (ref 135–144)
SPECIFIC GRAVITY UA: 1.02 (ref 1–1.03)
TOTAL PROTEIN: 5.4 G/DL (ref 6.4–8.3)
TOTAL PROTEIN: 7.1 G/DL (ref 6.4–8.3)
TRICHOMONAS: ABNORMAL
TROPONIN INTERP: ABNORMAL
TROPONIN INTERP: ABNORMAL
TROPONIN T: ABNORMAL NG/ML
TROPONIN T: ABNORMAL NG/ML
TROPONIN, HIGH SENSITIVITY: 33 NG/L (ref 0–22)
TROPONIN, HIGH SENSITIVITY: 42 NG/L (ref 0–22)
TURBIDITY: ABNORMAL
URINE HGB: ABNORMAL
UROBILINOGEN, URINE: NORMAL
WBC # BLD: 6 K/UL (ref 3.5–11.3)
WBC # BLD: ABNORMAL 10*3/UL
WBC UA: ABNORMAL /HPF (ref 0–5)
YEAST: ABNORMAL

## 2021-11-02 PROCEDURE — 80076 HEPATIC FUNCTION PANEL: CPT

## 2021-11-02 PROCEDURE — 87040 BLOOD CULTURE FOR BACTERIA: CPT

## 2021-11-02 PROCEDURE — 6360000002 HC RX W HCPCS: Performed by: EMERGENCY MEDICINE

## 2021-11-02 PROCEDURE — 6370000000 HC RX 637 (ALT 250 FOR IP): Performed by: EMERGENCY MEDICINE

## 2021-11-02 PROCEDURE — 81001 URINALYSIS AUTO W/SCOPE: CPT

## 2021-11-02 PROCEDURE — 6360000002 HC RX W HCPCS: Performed by: NURSE PRACTITIONER

## 2021-11-02 PROCEDURE — 84484 ASSAY OF TROPONIN QUANT: CPT

## 2021-11-02 PROCEDURE — 36415 COLL VENOUS BLD VENIPUNCTURE: CPT

## 2021-11-02 PROCEDURE — 96374 THER/PROPH/DIAG INJ IV PUSH: CPT

## 2021-11-02 PROCEDURE — 93005 ELECTROCARDIOGRAM TRACING: CPT | Performed by: EMERGENCY MEDICINE

## 2021-11-02 PROCEDURE — 83605 ASSAY OF LACTIC ACID: CPT

## 2021-11-02 PROCEDURE — 1200000000 HC SEMI PRIVATE

## 2021-11-02 PROCEDURE — 2580000003 HC RX 258: Performed by: EMERGENCY MEDICINE

## 2021-11-02 PROCEDURE — 85025 COMPLETE CBC W/AUTO DIFF WBC: CPT

## 2021-11-02 PROCEDURE — 99285 EMERGENCY DEPT VISIT HI MDM: CPT

## 2021-11-02 PROCEDURE — 71045 X-RAY EXAM CHEST 1 VIEW: CPT

## 2021-11-02 PROCEDURE — 85730 THROMBOPLASTIN TIME PARTIAL: CPT

## 2021-11-02 PROCEDURE — 83880 ASSAY OF NATRIURETIC PEPTIDE: CPT

## 2021-11-02 PROCEDURE — 2580000003 HC RX 258: Performed by: NURSE PRACTITIONER

## 2021-11-02 PROCEDURE — 87086 URINE CULTURE/COLONY COUNT: CPT

## 2021-11-02 PROCEDURE — 85610 PROTHROMBIN TIME: CPT

## 2021-11-02 PROCEDURE — 80048 BASIC METABOLIC PNL TOTAL CA: CPT

## 2021-11-02 RX ORDER — SODIUM CHLORIDE 0.9 % (FLUSH) 0.9 %
5-40 SYRINGE (ML) INJECTION EVERY 12 HOURS SCHEDULED
Status: DISCONTINUED | OUTPATIENT
Start: 2021-11-02 | End: 2021-11-04 | Stop reason: HOSPADM

## 2021-11-02 RX ORDER — ACETAMINOPHEN 325 MG/1
650 TABLET ORAL EVERY 6 HOURS PRN
Status: DISCONTINUED | OUTPATIENT
Start: 2021-11-02 | End: 2021-11-04 | Stop reason: HOSPADM

## 2021-11-02 RX ORDER — ASPIRIN 81 MG/1
81 TABLET ORAL DAILY
Status: DISCONTINUED | OUTPATIENT
Start: 2021-11-03 | End: 2021-11-04 | Stop reason: HOSPADM

## 2021-11-02 RX ORDER — ACETAMINOPHEN 650 MG/1
650 SUPPOSITORY RECTAL EVERY 6 HOURS PRN
Status: DISCONTINUED | OUTPATIENT
Start: 2021-11-02 | End: 2021-11-04 | Stop reason: HOSPADM

## 2021-11-02 RX ORDER — LEVOTHYROXINE SODIUM 175 UG/1
175 TABLET ORAL DAILY
Status: DISCONTINUED | OUTPATIENT
Start: 2021-11-03 | End: 2021-11-04 | Stop reason: HOSPADM

## 2021-11-02 RX ORDER — NICOTINE POLACRILEX 4 MG
15 LOZENGE BUCCAL PRN
Status: DISCONTINUED | OUTPATIENT
Start: 2021-11-02 | End: 2021-11-04 | Stop reason: HOSPADM

## 2021-11-02 RX ORDER — OXYBUTYNIN CHLORIDE 5 MG/1
5 TABLET ORAL 2 TIMES DAILY
Status: DISCONTINUED | OUTPATIENT
Start: 2021-11-03 | End: 2021-11-04 | Stop reason: HOSPADM

## 2021-11-02 RX ORDER — DULOXETIN HYDROCHLORIDE 30 MG/1
60 CAPSULE, DELAYED RELEASE ORAL EVERY MORNING
Status: DISCONTINUED | OUTPATIENT
Start: 2021-11-03 | End: 2021-11-04 | Stop reason: HOSPADM

## 2021-11-02 RX ORDER — OXYCODONE HYDROCHLORIDE AND ACETAMINOPHEN 5; 325 MG/1; MG/1
1 TABLET ORAL ONCE
Status: COMPLETED | OUTPATIENT
Start: 2021-11-02 | End: 2021-11-02

## 2021-11-02 RX ORDER — SODIUM CHLORIDE 9 MG/ML
INJECTION, SOLUTION INTRAVENOUS CONTINUOUS
Status: DISCONTINUED | OUTPATIENT
Start: 2021-11-02 | End: 2021-11-04 | Stop reason: HOSPADM

## 2021-11-02 RX ORDER — SODIUM CHLORIDE, SODIUM LACTATE, POTASSIUM CHLORIDE, AND CALCIUM CHLORIDE .6; .31; .03; .02 G/100ML; G/100ML; G/100ML; G/100ML
1000 INJECTION, SOLUTION INTRAVENOUS ONCE
Status: COMPLETED | OUTPATIENT
Start: 2021-11-02 | End: 2021-11-03

## 2021-11-02 RX ORDER — ONDANSETRON 2 MG/ML
4 INJECTION INTRAMUSCULAR; INTRAVENOUS EVERY 6 HOURS PRN
Status: DISCONTINUED | OUTPATIENT
Start: 2021-11-02 | End: 2021-11-04 | Stop reason: HOSPADM

## 2021-11-02 RX ORDER — PANTOPRAZOLE SODIUM 40 MG/1
40 TABLET, DELAYED RELEASE ORAL
Status: DISCONTINUED | OUTPATIENT
Start: 2021-11-03 | End: 2021-11-04 | Stop reason: HOSPADM

## 2021-11-02 RX ORDER — DIPHENOXYLATE HYDROCHLORIDE AND ATROPINE SULFATE 2.5; .025 MG/1; MG/1
1 TABLET ORAL 4 TIMES DAILY PRN
Status: DISCONTINUED | OUTPATIENT
Start: 2021-11-02 | End: 2021-11-04 | Stop reason: HOSPADM

## 2021-11-02 RX ORDER — PREGABALIN 75 MG/1
150 CAPSULE ORAL 2 TIMES DAILY
Status: DISCONTINUED | OUTPATIENT
Start: 2021-11-03 | End: 2021-11-04 | Stop reason: HOSPADM

## 2021-11-02 RX ORDER — HEPARIN SODIUM 5000 [USP'U]/ML
5000 INJECTION, SOLUTION INTRAVENOUS; SUBCUTANEOUS EVERY 8 HOURS SCHEDULED
Status: DISCONTINUED | OUTPATIENT
Start: 2021-11-02 | End: 2021-11-04 | Stop reason: HOSPADM

## 2021-11-02 RX ORDER — SODIUM POLYSTYRENE SULFONATE 15 G/60ML
15 SUSPENSION ORAL; RECTAL
Status: ACTIVE | OUTPATIENT
Start: 2021-11-02 | End: 2021-11-02

## 2021-11-02 RX ORDER — TAMSULOSIN HYDROCHLORIDE 0.4 MG/1
0.4 CAPSULE ORAL DAILY
Status: DISCONTINUED | OUTPATIENT
Start: 2021-11-03 | End: 2021-11-04 | Stop reason: HOSPADM

## 2021-11-02 RX ORDER — FENTANYL CITRATE 50 UG/ML
50 INJECTION, SOLUTION INTRAMUSCULAR; INTRAVENOUS ONCE
Status: DISCONTINUED | OUTPATIENT
Start: 2021-11-02 | End: 2021-11-02

## 2021-11-02 RX ORDER — DEXTROSE MONOHYDRATE 25 G/50ML
12.5 INJECTION, SOLUTION INTRAVENOUS PRN
Status: DISCONTINUED | OUTPATIENT
Start: 2021-11-02 | End: 2021-11-04 | Stop reason: HOSPADM

## 2021-11-02 RX ORDER — SODIUM CHLORIDE 9 MG/ML
25 INJECTION, SOLUTION INTRAVENOUS PRN
Status: DISCONTINUED | OUTPATIENT
Start: 2021-11-02 | End: 2021-11-04 | Stop reason: HOSPADM

## 2021-11-02 RX ORDER — DEXTROSE MONOHYDRATE 50 MG/ML
100 INJECTION, SOLUTION INTRAVENOUS PRN
Status: DISCONTINUED | OUTPATIENT
Start: 2021-11-02 | End: 2021-11-04 | Stop reason: HOSPADM

## 2021-11-02 RX ORDER — SODIUM CHLORIDE 0.9 % (FLUSH) 0.9 %
5-40 SYRINGE (ML) INJECTION PRN
Status: DISCONTINUED | OUTPATIENT
Start: 2021-11-02 | End: 2021-11-04 | Stop reason: HOSPADM

## 2021-11-02 RX ORDER — ONDANSETRON 4 MG/1
4 TABLET, ORALLY DISINTEGRATING ORAL EVERY 8 HOURS PRN
Status: DISCONTINUED | OUTPATIENT
Start: 2021-11-02 | End: 2021-11-04 | Stop reason: HOSPADM

## 2021-11-02 RX ORDER — QUETIAPINE FUMARATE 50 MG/1
50 TABLET, EXTENDED RELEASE ORAL NIGHTLY
Status: DISCONTINUED | OUTPATIENT
Start: 2021-11-02 | End: 2021-11-04 | Stop reason: HOSPADM

## 2021-11-02 RX ORDER — ALBUTEROL SULFATE 90 UG/1
2 AEROSOL, METERED RESPIRATORY (INHALATION) EVERY 6 HOURS PRN
Status: DISCONTINUED | OUTPATIENT
Start: 2021-11-02 | End: 2021-11-04 | Stop reason: HOSPADM

## 2021-11-02 RX ORDER — SODIUM POLYSTYRENE SULFONATE 15 G/60ML
30 SUSPENSION ORAL; RECTAL
Status: ACTIVE | OUTPATIENT
Start: 2021-11-02 | End: 2021-11-02

## 2021-11-02 RX ADMIN — SODIUM CHLORIDE: 9 INJECTION, SOLUTION INTRAVENOUS at 23:55

## 2021-11-02 RX ADMIN — HEPARIN SODIUM 5000 UNITS: 5000 INJECTION INTRAVENOUS; SUBCUTANEOUS at 23:52

## 2021-11-02 RX ADMIN — CEFTRIAXONE SODIUM 1000 MG: 1 INJECTION, POWDER, FOR SOLUTION INTRAMUSCULAR; INTRAVENOUS at 18:55

## 2021-11-02 RX ADMIN — OXYCODONE HYDROCHLORIDE AND ACETAMINOPHEN 1 TABLET: 5; 325 TABLET ORAL at 20:49

## 2021-11-02 RX ADMIN — SODIUM CHLORIDE, POTASSIUM CHLORIDE, SODIUM LACTATE AND CALCIUM CHLORIDE 1000 ML: 600; 310; 30; 20 INJECTION, SOLUTION INTRAVENOUS at 17:56

## 2021-11-02 ASSESSMENT — ENCOUNTER SYMPTOMS
SHORTNESS OF BREATH: 1
DIARRHEA: 0
VOMITING: 0
ABDOMINAL PAIN: 0
SORE THROAT: 0
NAUSEA: 0
CONSTIPATION: 0

## 2021-11-02 ASSESSMENT — PAIN DESCRIPTION - LOCATION: LOCATION: LEG;FOOT

## 2021-11-02 ASSESSMENT — PAIN SCALES - GENERAL
PAINLEVEL_OUTOF10: 7
PAINLEVEL_OUTOF10: 8

## 2021-11-02 ASSESSMENT — PAIN DESCRIPTION - ORIENTATION: ORIENTATION: LEFT;RIGHT

## 2021-11-02 ASSESSMENT — PAIN DESCRIPTION - PAIN TYPE: TYPE: CHRONIC PAIN

## 2021-11-02 NOTE — PROGRESS NOTES
This patient was assigned to me by error. This patient was never interviewed nor examined by me. I did not participate in the care of this patient.     Jeff Wilburn, PGY-2

## 2021-11-02 NOTE — ED NOTES
Report from [address] [place] [address] [address]     Ila Kumar, [facility]  11/02/21 [id number]

## 2021-11-02 NOTE — ED NOTES
Labeled urine specimen sent to lab via tube system.     [x] Urine Sample   []  Clean catch   [] Straight cath   [] Urine voided   [x]  Indwelling catheter   []  Suprapubic catheter     Sarahy Oakes RN  11/02/21 [id number]

## 2021-11-02 NOTE — ED NOTES
Pt arrived to ED alert and oriented x4 via LS 11.  Pt c/o BLE pain and swelling and sent for hypotension. Pt resides at a SNF and reportedly was hypotensive. EMS reported that pt had a SBP in the 70's prior to their arrival, stated that he was given 2L NS and they had SBP in the 's. On arrival, pt reports chronic BLE pain and swelling. Pt denies having been around anyone suspected to have COVID-19 or anyone that has been sick, denies recent travel outside the [facility] [place] or [address]. Pt placed on cardiac monitor, continuous pulse ox, and BP cuff. RR even and unlabored. NAD noted. Whiteboard updated. Will continue with plan of care.        Aishwarya Turner RN  11/02/21 [id number]

## 2021-11-02 NOTE — DISCHARGE INSTR - COC
Continuity of Care Form    Patient Name: Theodore Ha   :  1965  MRN:  [id number]    Admit date:  2021  Discharge date:  21    Code Status Order: Prior   Advance Directives:      Admitting Physician:  No admitting provider for patient encounter.   PCP: Jess Dawn MD    Discharging Nurse: siena  [address] Unit/Room#:   Discharging Unit Phone Number: [phone]    Emergency Contact:   Extended Emergency Contact Information  Primary Emergency Contact: Lex Cid [address] Phone: [phone]  Work Phone: [phone]  Mobile Phone: [phone]  Relation: Child  Secondary Emergency Contact: Juan F Do [address] Phone: [phone]  Work Phone: [phone]  Mobile Phone: [phone]  Relation: Child    Past Surgical History:  Past Surgical History:   Procedure Laterality Date    ABDOMEN SURGERY      hernia repair x4 (ventral)    COLONOSCOPY      2012    CYSTOSCOPY  2018    CYSTOSCOPY  2019    CYSTOSCOPY N/A 2019    CYSTOSCOPY DILATION performed by Rocael Moctezuma MD at [address] [place] [address] 2019    CYSTOSCOPY/ [address] performed by Rocael Moctezuma MD at [facility]     [facility]  2018    repair and reduction of recurrent incarcerated incisional hernia with mesh    IA CYSTOURETHROSCOPY N/A 2018    CYSTOSCOPY Annamae Marrow performed by Rocael Moctezuma MD at [address] Bilateral 2017    FOOT [facility] with bone bx performed by Hennie Epley, DPM at [address] EGD TRANSORAL BIOPSY SINGLE/MULTIPLE N/A 2017    EGD BIOPSY performed by Wendy Lucas MD at [facility]  2017    polypectomy    UPPER GASTROINTESTINAL ENDOSCOPY N/A 3/14/2019    EGD BIOPSY performed by Nabeel Adames MD at [address] [place] [address] VENTRAL HERNIA REPAIR N/A 5/20/2018    REPAIR AND REDUCTION OF RECURRENT INCARCERATED INCISIONAL HERNIA WITH MESH performed by Martien Acosta MD at [facility]       Immunization History:   Immunization History   Administered Date(s) Administered    Influenza, Quadv, IM, PF (6 mo and older Fluzone, Flulaval, Fluarix, and 3 yrs and older Afluria) 10/29/2021       Active Problems:  Patient Active Problem List   Diagnosis Code    Alcoholic cirrhosis of liver ([facility] [address] [address] sober since 2013 K70.30    Peripheral edema R60.9    Bipolar disorder ([facility] [address] F31.9    Type 2 diabetes mellitus with diabetic neuropathy, with long-term current use of insulin ([facility] E11.40, Z79.4    Essential hypertension, currently hypotensive I10    Dyslipidemia E78.5    Weakness R53.1    Hyponatremia A05.0    Metabolic encephalopathy W01.11    FABI (obstructive sleep apnea) G47.33    Primary osteoarthritis of right knee M17.11    Type 2 diabetes mellitus with diabetic neuropathy ([facility] [address] E11.40    Acquired hypothyroidism E03.9    Urine retention R33.9    Anemia D64.9    Cellulitis of right lower extremity L03.115    Slow transit constipation K59.01    Constipation K59.00    Iron deficiency anemia due to chronic blood loss D50.0    Gastroesophageal reflux disease without esophagitis K21.9    LEONARDA (acute kidney injury) (HCC) N17.9    Secondary esophageal varices without bleeding (HCC) I85.10    Portal hypertension (HCC) K76.6    Morbid obesity with BMI of 50.0-59.9, adult (HCC) E66.01, Z68.43    Venous stasis dermatitis of both lower extremities I87.2    Cellulitis of left lower extremity L03. 116    Cellulitis of perineum L03.315    Epididymoorchitis N45.3    Abdominal pain R10.9    Multiple and open wound of lower limb S81.809A    Scrotal edema N50.89    Retention, urine R33.9    SBO (small bowel obstruction) ([facility] [address] K56.609    Incisional hernia with obstruction but no gangrene K43.0    Chronic indwelling Clemons catheter Z97.8    Anxiety and depression F41.9, F32. A    Back pain, chronic M54.9, G89.29    BPH (benign prostatic hyperplasia) W02.4    Diastolic congestive heart failure (HCC) I50.30    Cirrhosis ([address] K74.60    Diabetes mellitus (HCC) E11.9    GERD (gastroesophageal reflux disease) K21.9    H/O cardiac catheterization Z98.890    Hepatitis K75.9    Hiatal hernia K44.9    History of alcohol abuse F10.11    Hypertension I10    IBS (irritable bowel syndrome) K58.9    Morbid obesity (HCC) E66.01    Neuropathy G62.9    On home oxygen therapy Z99.81    Pancreatitis K85.90    Sleep apnea G47.30    Thyroid disease E07.9    Urinary bladder neurogenic dysfunction N31.9    Urinary tract infection associated with indwelling urethral catheter ([facility]) T83.511A, N39.0    Delirium due to another medical condition F05    Musculoskeletal immobility Z74.09    Pyocystis N30.80    Myoclonic jerking G25.3    Poisoning by insulin and oral hypoglycemic (antidiabetic) drugs, accidental (unintentional), initial encounter T38.3X1A    Thrombocytopenia (HCC) D69.6    Hypotension I95.9    Klebsiella sepsis ([facility]) A41.59    Septic shock ([facility]) A41.9, R65.21    Urinary tract infection without hematuria N39.0       Isolation/Infection:   Isolation            No Isolation          Patient Infection Status       Infection Onset Added Last Indicated Last Indicated By Review Planned Expiration Resolved Resolved By    MDRO (multi-drug resistant organism) 10/14/21 10/16/21 10/14/21 Culture, Blood 1        Klebsiella urine 10/2021    Resolved    COVID-19 Rule Out 10/22/21 10/22/21 10/22/21 Respiratory Panel, Molecular, with COVID-19 (Restricted: peds pts or suitable admitted adults) (Ordered)   10/25/21 Cristboal Rios RN    COVID-19 Rule Out 10/21/21 10/21/21 10/21/21 COVID-19, Rapid (Ordered)   10/21/21 Rule-Out Test Resulted            Nurse Assessment:  Last Vital Signs: BP (!) 115/54   Pulse 70   Temp 97.7 °F (36.5 °C) (Oral)   Resp 21    5' 11\" (1.803 m)   Wt (!) 378 lb (171.5 kg)   SpO2 100%   BMI 52.72 kg/m²     Last documented pain score (0-10 scale): Pain Level: 7  Last Weight:   Wt Readings from Last 1 Encounters:   11/02/21 (!) 378 lb (171.5 kg)     Mental Status:  oriented, alert, coherent, logical, thought processes intact and able to concentrate and follow conversation    IV Access:  - None    Nursing Mobility/ADLs:  Walking   Assisted  Transfer  Assisted  Bathing  Assisted  Dressing  Assisted  Toileting  Assisted  Feeding  Assisted  Med Admin  Assisted  Med Delivery   whole    Wound Care Documentation and Therapy:        Elimination:  Continence:   · Bowel: No  · Bladder: No  Urinary Catheter:  chronic wells catheter for retention    Colostomy/Ileostomy/Ileal Conduit: No       Date of Last BM: 11-2-21    Intake/Output Summary (Last 24 hours) at 11/2/2021 1854  Last data filed at 11/2/2021 1721  Gross per 24 hour   Intake    Output 210 ml   Net -210 ml     No intake/output data recorded. Safety Concerns: At Risk for Falls    Impairments/Disabilities:      Vision and Hearing    Nutrition Therapy:  Current Nutrition Therapy:   - Oral Diet:  Carb Control 4 carbs/meal (1800kcals/day) and Low Sodium (2gm)    Routes of Feeding: Oral  Liquids: No Restrictions  Daily Fluid Restriction: no  Last Modified Barium Swallow with Video (Video Swallowing Test): not done    Treatments at the Time of Hospital Discharge:   Respiratory Treatments: see mar  Oxygen Therapy:  is on oxygen at 3 L/min per nasal cannula. Ventilator:    - No ventilator support    Rehab Therapies: Physical Therapy and Occupational Therapy  Weight Bearing Status/Restrictions: No weight bearing restirctions  Other Medical Equipment (for information only, NOT a DME order): Other Treatments: wells care per protocol. Please repeat bmp in 3 days. Hold lasix until bmp results.   Patient's personal belongings (please select all that are sent with patient)    RN SIGNATURE:  Electronically signed by Vale Carrera RN on 11/4/21 at 11:30 AM EDT    CASE MANAGEMENT/SOCIAL WORK SECTION    Inpatient Status Date: ***    Readmission Risk Assessment Score:  Readmission Risk              Risk of Unplanned Readmission:  0           Discharging to Facility/ Agency   [facility] [address] Details  FAX            [phone] [facility] [address]       Phone: [phone]       Fax: [phone]          Dialysis Facility (if applicable)   · Name:  · Address:  · Dialysis Schedule:  · Phone:  · Fax:    / signature: Electronically signed by Cristobal Adorno RN on 11/4/21 at 4:45 PM EDT    PHYSICIAN SECTION    Prognosis: Fair    Condition at Discharge: Stable    Rehab Potential (if transferring to Rehab): Fair    Recommended Labs or Other Treatments After Discharge: Follow up with nephrology     Physician Certification: I certify the above information and transfer of Otilia Bennett  is necessary for the continuing treatment of the diagnosis listed and that he requires [place] for less 30 days.      Update Admission H&P: No change in H&P    PHYSICIAN SIGNATURE:  Electronically signed by Mahesh Briseno MD on 11/4/21 at 4:17 PM EDT

## 2021-11-02 NOTE — ED NOTES
Labeled blood specimens sent to lab via tube system.     [x] Lavender   [] on ice   [x] Blue   [x] Green/yellow  [x] Green/black [] on ice  [] Pink  [] Red  [] Yellow  [] Blood Cultures      Travis Young RN  11/02/21 [id number]

## 2021-11-02 NOTE — ED NOTES
Bed: 19  Expected date:   Expected time:   Means of arrival:   Comments:  [address] RN  11/02/21 5559

## 2021-11-02 NOTE — ED PROVIDER NOTES
[facility]  ED  Emergency Department Encounter  EmergencyMedicine Resident     Pt Name:Ruben Pedroza  MRN: [id number]  Birthdate 1965  Date of evaluation: 11/2/21  PCP:  Mahendra Pichardo MD    [address] [place] [address]       Chief Complaint   Patient presents with    Hypotension     Had SBP 70's yesterday, was 's per EMS       HISTORY OF PRESENT ILLNESS  (Location/Symptom, Timing/Onset, Context/Setting, Quality, Duration, Modifying Factors, Severity.)      Madie Nathan is a 61 y.o. male who presents to the emergency department with a 1 day history of hypotension noted at [facility], his skilled nursing facility. Patient was just discharged on the 29th after 6-day stay for pneumonia treated with antibiotics. Fluid balance was an issue as the patient has a history of CHF and he received over diuresis with subsequent fluid repletion. Patient states he has had minimal p.o. hydration since his discharge, primarily drinking soda and 1 glass of water a day. At the skilled nursing facility he was noted to be hypotensive but denied any new symptoms other than some mild chest pain for the past few days which is vague and nonspecific (patient is poor historian). Patient received 2 L IV fluid at the nursing facility with resolution of the hypotension and now blood pressure is 120/51 and patient is awake, alert and oriented. At no point was altered mental status noted although patient states he feels a little more confused than normal.  He is alert and oriented x3 however. Denies any recent fever, chills, nausea, vomiting, worsening shortness of breath (patient does normally sleep in a recliner and use a recliner at home due to chronic weakness), worsening of his chronic bilateral lower extremity edema, or new onset numbness or tingling anywhere. He does however have a chronic indwelling Clemons catheter.   Last echocardiogram on October 21, 2021 demonstrated LVEF 50%, possible bicuspid aortic valve.    PAST MEDICAL / SURGICAL / SOCIAL / FAMILY HISTORY      has a past medical history of Anxiety and depression, Back pain, chronic, BPH (benign prostatic hyperplasia), CHF (congestive heart failure) ([facility] [address] Cirrhosis ([facility] [address] Diabetes mellitus ([facility] [address] GERD (gastroesophageal reflux disease), H/O cardiac catheterization, Hepatitis, Hiatal hernia, History of alcohol abuse, Hyperlipidemia, Hypertension, IBS (irritable bowel syndrome), Morbid obesity ([facility] [address] Neuropathy, On home oxygen therapy, Pancreatitis, Primary osteoarthritis of right knee, Sleep apnea, Thyroid disease, and Urinary bladder neurogenic dysfunction. has a past surgical history that includes Colonoscopy; Abdomen surgery; hernia repair; Endoscopy, colon, diagnostic; Upper gastrointestinal endoscopy (07/19/2017); pr egd transoral biopsy single/multiple (N/A, 7/19/2017); pr deep dissec foot infec,1 bursa (Bilateral, 8/22/2017); Cystoscopy (01/24/2018); pr cystourethroscopy (N/A, 1/24/2018); Incisional hernia repair (05/20/2018); ventral hernia repair (N/A, 5/20/2018); Cystocopy (02/20/2019); Cystoscopy (N/A, 2/20/2019); Upper gastrointestinal endoscopy (N/A, 3/14/2019); and Cystoscopy (N/A, 8/23/2019).     Social History     Socioeconomic History    Marital status:      Spouse name: Not on file    Number of children: Not on file    Years of education: Not on file    Highest education level: Not on file   Occupational History    Not on file   Tobacco Use    Smoking status: Never Smoker    Smokeless tobacco: Never Used   Vaping Use    Vaping Use: Never used   Substance and Sexual Activity    Alcohol use: No     Comment: quit drinking 2012    Drug use: No    Sexual activity: Not on file   Other Topics Concern    Not on file   Social History Narrative    Not on file     Social Determinants of Health     Financial Resource Strain:     Difficulty of Paying Living Expenses:    Food Insecurity:     Worried About Running Out of Food in the Last Year:    [address] in the Last Year:    Transportation Needs:     Lack of Transportation (Medical):  Lack of Transportation (Non-Medical):    Physical Activity:     Days of Exercise per Week:     Minutes of Exercise per Session:    Stress:     Feeling of Stress :    Social Connections:     Frequency of Communication with Friends and Family:     Frequency of Social Gatherings with Friends and Family:     Attends Worship Services:     Active Member of Clubs or Organizations:     Attends Club or Organization Meetings:     Marital Status:    Intimate Partner Violence:     Fear of Current or Ex-Partner:     Emotionally Abused:     Physically Abused:     Sexually Abused:        Family History   Adopted: Yes       Allergies:  No known allergies    Home Medications:  Prior to Admission medications    Medication Sig Start Date End Date Taking? Authorizing Provider   clonazePAM (KLONOPIN) 0.5 MG tablet Take 1 tablet by mouth 2 times daily for 3 days. 10/29/21 11/1/21  Vu Gavin PA-C   furosemide (LASIX) 40 MG tablet Take 1 tablet by mouth daily 10/29/21 11/28/21  Vu Gavin PA-C   losartan (COZAAR) 50 MG tablet Take 1 tablet by mouth daily 10/30/21   Vu Gavin PA-C   pregabalin (LYRICA) 150 MG capsule Take 150 mg by mouth 2 times daily.     Historical Provider, MD   SITagliptin (JANUVIA) 100 MG tablet Take 100 mg by mouth daily    Historical Provider, MD   metFORMIN (GLUCOPHAGE) 500 MG tablet Take 500 mg by mouth 2 times daily (with meals)    Historical Provider, MD   nystatin (MYCOSTATIN) 383501 UNIT/GM cream Apply topically 2 times daily as needed (to skin folds)    Historical Provider, MD   QUEtiapine (SEROQUEL XR) 50 MG extended release tablet Take 50 mg by mouth nightly    Historical Provider, MD   triamcinolone (KENALOG) 0.025 % cream Apply topically 2 times daily as needed    Historical Provider, MD   diphenoxylate-atropine (LOMOTIL) 2.5-0.025 MG per tablet Take 1 tablet by mouth 4 times daily as needed for Diarrhea. Historical Provider, MD   fluocinonide (LIDEX) 0.05 % external solution Apply topically as needed (scalp itching)    Historical Provider, MD   ketoconazole (NIZORAL) 2 % shampoo Apply topically Twice a Week    Historical Provider, MD   albuterol sulfate HFA (PROAIR HFA) 108 (90 Base) MCG/ACT inhaler Inhale 2 puffs into the lungs every 6 hours as needed for Wheezing    Historical Provider, MD   tamsulosin (FLOMAX) 0.4 MG capsule Take 1 capsule by mouth daily 1/24/18   Davis Dsouza MD   oxybutynin (DITROPAN) 5 MG tablet Take 1 tablet by mouth 2 times daily 12/12/17   Tanja Degroot,    aspirin 81 MG EC tablet Take 1 tablet by mouth daily 7/26/17   Lidia Tolentino,    levothyroxine (SYNTHROID) 175 MCG tablet Take 175 mcg by mouth Daily     Historical Provider, MD   nystatin (MYCOSTATIN) 651408 UNIT/GM powder Apply topically 2 times daily as needed TO ABDOMINAL FOLDS, GROIN     Historical Provider, MD   esomeprazole (NEXIUM) 40 MG capsule Take 40 mg by mouth 2 times daily     Historical Provider, MD   DULoxetine (CYMBALTA) 60 MG capsule Take 60 mg by mouth every morning     Historical Provider, MD       REVIEW OF SYSTEMS    (2-9 systems for level 4, 10 or more for level 5)      Review of Systems   Constitutional: Positive for chills (chronic) and fatigue (chronic, amb with assistance at home). Negative for fever. HENT: Negative for ear pain, hearing loss and sore throat. Eyes: Negative for visual disturbance (chronic h/o cataracts, \"floaters\"). Respiratory: Positive for shortness of breath. Cardiovascular: Positive for chest pain and leg swelling (chronic). Gastrointestinal: Negative for abdominal pain, constipation, diarrhea, nausea and vomiting. Genitourinary: Negative for difficulty urinating and dysuria. Musculoskeletal: Negative for arthralgias and myalgias. Neurological: Negative for weakness and numbness. General: No focal deficit present. Mental Status: He is alert and oriented to person, place, and time. Mental status is at baseline. Cranial Nerves: No cranial nerve deficit. Motor: No abnormal muscle tone. DIFFERENTIAL  DIAGNOSIS     PLAN (LABS / IMAGING / EKG):  Orders Placed This Encounter   Procedures    Culture, Urine    Culture, Blood 1    Culture, Blood 2    XR CHEST PORTABLE    CBC Auto Differential    Troponin    Brain Natriuretic Peptide    Urinalysis Reflex to Culture    Basic Metabolic Panel w/ Reflex to MG    LACTIC ACID, WHOLE BLOOD    Microscopic Urinalysis    Troponin    Hepatic Function Panel    Hepatic Function Panel    Protime-INR    APTT    Inpatient consult to Hospitalist    EKG 12 Lead    Insert peripheral IV       MEDICATIONS ORDERED:  Orders Placed This Encounter   Medications    lactated ringers bolus    cefTRIAXone (ROCEPHIN) 1000 mg IVPB in 50 mL D5W minibag     Order Specific Question:   Antimicrobial Indications     Answer:   Urinary Tract Infection     Order Specific Question:   Antimicrobial Indications     Answer: Other     Order Specific Question:   Other Abx Indication     Answer:   Sepsis       DDX: Shree Brandt is a 61 y.o. male who presents to the emergency department with hypotension, dehydration.  Differential diagnosis includes intravascular volume depletion, overdiuresis, CHF exacerbation, ACS, sepsis, UTI    DIAGNOSTIC RESULTS / EMERGENCY DEPARTMENT COURSE / MDM   LAB RESULTS:  Results for orders placed or performed during the hospital encounter of 11/02/21   CBC Auto Differential   Result Value Ref Range    WBC 6.0 3.5 - 11.3 k/uL    RBC 3.81 (L) 4.21 - 5.77 m/uL    Hemoglobin 9.5 (L) 13.0 - 17.0 g/dL    Hematocrit 33.3 (L) 40.7 - 50.3 %    MCV 87.4 82.6 - 102.9 fL    MCH 24.9 (L) 25.2 - 33.5 pg    MCHC 28.5 28.4 - 34.8 g/dL    RDW 16.2 (H) 11.8 - 14.4 %    Platelets 465 165 - 405 k/uL    MPV 11.6 8.1 - 13.5 fL    NRBC Automated 0.0 0.0 per 100 WBC    Differential Type NOT REPORTED     WBC Morphology NOT REPORTED     RBC Morphology ANISOCYTOSIS PRESENT     Platelet Estimate NOT REPORTED     Seg Neutrophils 66 (H) 36 - 65 %    Lymphocytes 19 (L) 24 - 43 %    Monocytes 10 3 - 12 %    Eosinophils % 4 1 - 4 %    Basophils 1 0 - 2 %    Immature Granulocytes 1 (H) 0 %    Segs Absolute 3.95 1.50 - 8.10 k/uL    Absolute Lymph # 1.11 1.10 - 3.70 k/uL    Absolute Mono # 0.60 0.10 - 1.20 k/uL    Absolute Eos # 0.22 0.00 - 0.44 k/uL    Basophils Absolute 0.04 0.00 - 0.20 k/uL    Absolute Immature Granulocyte 0.03 0.00 - 0.30 k/uL   Troponin   Result Value Ref Range    Troponin, High Sensitivity 42 (H) 0 - 22 ng/L    Troponin T NOT REPORTED <0.03 ng/mL    Troponin Interp NOT REPORTED    Brain Natriuretic Peptide   Result Value Ref Range    Pro-BNP 2,339 (H) <300 pg/mL    BNP Interpretation NOT REPORTED    Urinalysis Reflex to Culture    Specimen: Urine, indwelling catheter   Result Value Ref Range    Color, UA Dark Yellow (A) Yellow    Turbidity UA Cloudy (A) Clear    Glucose, Ur NEGATIVE NEGATIVE    Bilirubin Urine NEGATIVE NEGATIVE    Ketones, Urine TRACE (A) NEGATIVE    Specific Gravity, UA 1.018 1.005 - 1.030    Urine Hgb SMALL (A) NEGATIVE    pH, UA 5.0 5.0 - 8.0    Protein, UA 1+ (A) NEGATIVE    Urobilinogen, Urine Normal Normal    Nitrite, Urine NEGATIVE NEGATIVE    Leukocyte Esterase, Urine MODERATE (A) NEGATIVE    Urinalysis Comments NOT REPORTED    Basic Metabolic Panel w/ Reflex to MG   Result Value Ref Range    Glucose 134 (H) 70 - 99 mg/dL    BUN 26 (H) 6 - 20 mg/dL    CREATININE 4.62 (H) 0.70 - 1.20 mg/dL    Bun/Cre Ratio NOT REPORTED 9 - 20    Calcium 8.6 8.6 - 10.4 mg/dL    Sodium 130 (L) 135 - 144 mmol/L    Potassium 4.6 3.7 - 5.3 mmol/L    Chloride 92 (L) 98 - 107 mmol/L    CO2 25 20 - 31 mmol/L    Anion Gap 13 9 - 17 mmol/L    GFR Non-African American 13 (L) >60 mL/min    GFR  16 (L) >60 mL/min    GFR Comment GFR Staging NOT REPORTED    LACTIC ACID, WHOLE BLOOD   Result Value Ref Range    Lactic Acid, Whole Blood 3.1 (H) 0.7 - 2.1 mmol/L   Microscopic Urinalysis   Result Value Ref Range    -          WBC, UA 20 TO 50 0 - 5 /HPF    RBC, UA 2 TO 5 0 - 2 /HPF    Casts UA 10 TO 20 0 - 2 /LPF    Casts UA HYALINE 0 - 2 /LPF    Crystals, UA NOT REPORTED None /HPF    Epithelial Cells UA 2 TO 5 0 - 5 /HPF    Renal Epithelial, UA NOT REPORTED 0 /HPF    Bacteria, UA NOT REPORTED None    Mucus, UA 1+ (A) None    Trichomonas, UA NOT REPORTED None    Amorphous, UA NOT REPORTED None    Other Observations UA NOT REPORTED NOT REQ. Yeast, UA NOT REPORTED None       IMPRESSION: Heber Cooley is a 61 y.o. male who presents to the emergency department with hypotension and dehydration with some mild chest pain over the past few days. On examination he is afebrile, vital signs demonstrate borderline hypotension at the time of arrival to the emergency department and examination demonstrates a deconditioned appearing edematous male with normal heart and lung sounds and mild suprapubic tenderness to palpation. We will obtain labs to exclude UTI on this patient as there are several factors that are concerning for this. We will also obtain basic cardiac work-up and labs to evaluate for endorgan damage, CHF exacerbation, intravascular volume depletion. Patient did end up developing some hypotension with systolic blood pressures in the mid 80s and received another liter of crystalloid with improvement in blood pressure. Likely admission.     RADIOLOGY:  ECHO Complete 2D W Doppler W Color    Result Date: 10/25/2021  Transthoracic Echocardiography Report (TTE)  Patient Name [facility]        Date of Study               10/25/2021               GUSTAVO GROVE   Date of      1965  Gender                      Male  Birth   Age          61 year(s)  Race                           Room Number  0429        Height:                     79 inch, 177.8 cm   Corporate ID [id number]    Weight:                     400 pounds, 181.4 kg  #   Patient Acct [de-identified]   BSA:          2.8 m^2       BMI:       57.39  #                                                               kg/m^2   MR #         [id number]     Sonographer                 [facility]   Accession #  [id number]  Interpreting Physician      [address] [place] [address]   Fellow                   Referring Nurse                           Practitioner   Interpreting             Referring Physician         [facility]  Fellow  Additional Comments Technically difficult study, patient body habitus. Type of Study   TTE procedure:2D Echocardiogram, M-Mode, Doppler, Color Doppler. Procedure Date Date: 10/25/2021 Start: 09:21 AM Study Location: [facility] Technical Quality: Poor visualization Indications:Elevated troponin. History / Tech. Comments: Procedure explained to patient. Echo completed in the Echo Lab. Patient supine and unable to be repositioned. PMHx: Diabetes, Hypertension Patient Status: Inpatient Height: 70 inches Weight: 400.01 pounds BSA: 2.8 m^2 BMI: 57.39 kg/m^2 Allergies   - *No Known Allergies. CONCLUSIONS Summary Left ventricle appears normal in size. Mild to moderately increased left ventricular wall thickness, with asymmetrical thickening of the septum. Left ventricular systolic function appears overall preserved. Estimated EF 50%. Unable to assess for wall motion abnormalities within the limits of the study. Left atrial dilatation. Aortic valve is difficult to assess within the limitations of the study. Aortic valve right and left coronary cusps are sclerotic and appears to be possible fused, cannot rule out Bicuspid AV. Unable to assess adequately for aortic stenosis, but does appear to be at least mild aortic stenosis. Mitral valve sclerosis without stenosis.  Signature ----------------------------------------------------------------------------  Electronically signed by Marshall Bence, Yudith(Sonographer) on 10/25/2021 11:09 AM ---------------------------------------------------------------------------- ----------------------------------------------------------------------------  Electronically signed by Kiran Cox(Interpreting physician) on 10/25/2021  11:28 AM ---------------------------------------------------------------------------- FINDINGS Left Atrium Left atrial dilatation. Left Ventricle Left ventricle appears normal in size. Mild to moderately increased left ventricular wall thickness, with asymmetrical thickening of the septum. Left ventricular systolic function appears overall preserved. Estimated EF 50%. Unable to assess for wall motion abnormalities within the limits of the study. Right Atrium Right atrium was not well visualized. Right Ventricle Right ventricle was not well visualized. Mitral Valve Normal mitral valve structure. Mitral valve sclerosis without stenosis. No evidence of mitral regurgitation. Aortic Valve Aortic valve is difficult to assess within the limitations of the study. Aortic valve right and left coronary cusps are sclerotic and appears to be possible fused, cannot rule out Bicuspid AV. Unable to assess adequately for aortic stenosis, but does appear to be at least mild aortic stenosis. No evidence of aortic insufficiency. Tricuspid Valve Tricuspid valve was not well visualized. No tricuspid regurgitation was seen. Insignificant tricuspid regurgitation, unable to estimate RVSP. Pulmonic Valve Pulmonic valve not well visualized but Doppler velocities are normal. Pericardial Effusion No significant pericardial effusion is seen. Miscellaneous Normal aortic root dimension. E/E' average = 6.45. IVC not well visualized.  M-mode / 2D Measurements & Calculations:   LVIDd:5.9 cm(3.7 - 5.6 cm)      Diastolic XSYBQD:405 ml  RTWS:1.9 cm(0.6 - 1.1 cm)       Aortic Root:4.1 cm(2.0 - 3.7 cm)  LVPWd:1.2 cm(0.6 - 1.1 cm)      LA Dimension: 4.9 cm(1.9 - 4.0 cm) LVOT:2.6 cm                                  RVDd:4.2 cm   Mitral:                                 Aortic   Valve Area (P1/2-Time): 2.56 cm^2       Peak Velocity: 2.32 m/s  Peak E-Wave: 0.45 m/s                   Mean Velocity: 1.51 m/s  Peak A-Wave: 0.68 m/s                   Peak Gradient: 21.53 mmHg  E/A Ratio: 0.66                         Mean Gradient: 11 mmHg  Peak Gradient: 0.81 mmHg  Mean Gradient: 2 mmHg  Deceleration Time: 303 msec             Area (continuity): 2.97 cm^2  P1/2t: 86 msec                          AV VTI: 41.7 cm   Area (continuity): 4.63 cm^2  Mean Velocity: 0.61 m/s                                           Pulmonic:                                           Peak Velocity: 1.55 m/s                                          Peak Gradient: 9.61 mmHg  Diastology / Tissue Doppler Septal Wall E' velocity:0.07 m/s Septal Wall E/E':6.8 Lateral Wall E' velocity:0.07 m/s Lateral Wall E/E':6.1    XR CHEST (SINGLE VIEW FRONTAL)    Result Date: 10/24/2021  EXAMINATION: ONE XRAY VIEW OF THE CHEST 10/24/2021 9:11 am COMPARISON: 10/21/2021 chest radiograph and CT chest HISTORY: ORDERING SYSTEM PROVIDED HISTORY: hypoxia TECHNOLOGIST PROVIDED HISTORY: hypoxia Reason for Exam: port upright with GRID Acuity: Unknown FINDINGS: The cardiac silhouette is enlarged, unchanged. Low lung volumes. Small bilateral pleural effusions and adjacent atelectasis. Patchy bilateral airspace disease, not significantly changed from prior. No pneumothorax. No evidence of acute osseous abnormality. Stable cardiomegaly with low lung volumes and small bilateral pleural effusions. Patchy bilateral airspace disease is not significantly changed from prior.      CT HEAD WO CONTRAST    Result Date: 10/22/2021  EXAMINATION: CT OF THE HEAD WITHOUT CONTRAST  10/22/2021 12:10 am TECHNIQUE: CT of the head was performed without the administration of intravenous contrast. Dose modulation, iterative reconstruction, and/or weight based adjustment of the mA/kV was utilized to reduce the radiation dose to as low as reasonably achievable. COMPARISON: 11/06/2020 HISTORY: ORDERING SYSTEM PROVIDED HISTORY: AMS TECHNOLOGIST PROVIDED HISTORY: AMS Decision Support Exception - unselect if not a suspected or confirmed emergency medical condition->Emergency Medical Condition (MA) Reason for Exam: AMS Acuity: Unknown Type of Exam: Unknown FINDINGS: BRAIN/VENTRICLES: There is no acute intracranial hemorrhage, mass effect or midline shift. No abnormal extra-axial fluid collection. The gray-white differentiation is maintained without evidence of an acute infarct. There is no evidence of hydrocephalus. ORBITS: The bilateral globes are intact. SINUSES: Mucoperiosteal thickening of the right sphenoid sinus is seen. The left sphenoid sinus is completely opacified. SOFT TISSUES/SKULL:  No acute abnormality of the visualized skull or soft tissues. No acute intracranial abnormality. MRI may be obtained if clinically indicated. CT ABDOMEN PELVIS W IV CONTRAST Additional Contrast? None    Result Date: 10/22/2021  EXAMINATION: CT OF THE ABDOMEN AND PELVIS WITH CONTRAST 10/21/2021 11:37 pm TECHNIQUE: CT of the abdomen and pelvis was performed with the administration of intravenous contrast. Multiplanar reformatted images are provided for review. Dose modulation, iterative reconstruction, and/or weight based adjustment of the mA/kV was utilized to reduce the radiation dose to as low as reasonably achievable. COMPARISON: 12/18/2019 HISTORY: ORDERING SYSTEM PROVIDED HISTORY: AMS, lower abdominal pain TECHNOLOGIST PROVIDED HISTORY: Please go through testicles AMS, lower abdominal pain Reason for Exam: AMS, lower abdominal pain Acuity: Unknown Type of Exam: Unknown FINDINGS: Lower Chest: Please see separately dictated chest CT for findings the diaphragm. Small bilateral pleural effusions are again noted.   A 2.8 cm left pectoral subcutaneous nodules again noted, increased in size compared to 2019 when it measured 2.2 cm. Organs: Cirrhotic configuration of the liver. The spleen is enlarged to 18 cm in craniocaudal dimension. The pancreas, kidneys and adrenal glands are without acute findings. The gallbladder is present without radiopaque cholelithiasis. GI/Bowel: No mechanical bowel obstruction. No evidence of appendicitis. Pelvis: Nonspecific presacral stranding. The prostate is diminutive. The urinary bladder contains a Clemons catheter and is nondistended. No pelvic adenopathy by size criteria. Small fat containing left inguinal hernia. Peritoneum/Retroperitoneum: Moderate atherosclerotic plaque. No ascites or pneumoperitoneum. Status post ventral hernia repair. Small fat containing left paramedian ventral hernia in left lower quadrant. Bones/Soft Tissues: No acute or aggressive osseous lesions. Small abdominal wall varices. Nonspecific presacral stranding. Otherwise no evidence of acute infectious or inflammatory process in the abdomen or pelvis. Cirrhosis with splenomegaly. XR CHEST PORTABLE    Result Date: 10/21/2021  EXAMINATION: ONE XRAY VIEW OF THE CHEST 10/21/2021 4:13 pm COMPARISON: October 15, 2021 HISTORY: ORDERING SYSTEM PROVIDED HISTORY: SOB TECHNOLOGIST PROVIDED HISTORY: SOB Reason for Exam: shortness of breath upright port FINDINGS: Marginal inspiration is present. Cardiomegaly is noted. Interstitial and alveolar opacities are present bilaterally, slightly worse compared to the prior study. Right pleural effusion is noted. Atelectatic changes present within the lung bases. Osseous structures are stable. No pneumothorax is noted. 1. Cardiomegaly 2. Interval worsening of the interstitial alveolar opacities bilaterally, differential considerations include worsening edema versus infection 3.  Small right pleural effusion     XR CHEST PORTABLE    Result Date: 10/15/2021  EXAMINATION: ONE XRAY VIEW OF THE CHEST 10/15/2021 Acuity: Unknown Type of Exam: Unknown FINDINGS: Pulmonary Arteries: Suboptimal evaluation of the subsegmental and more peripheral pulmonary arteries due to motion artifact. Given this, no obvious acute pulmonary thromboembolic disease. Prominent main pulmonary artery measures 3.6 cm in diameter. Mediastinum: No evidence of mediastinal lymphadenopathy. The heart and pericardium demonstrate no acute abnormality. There is no acute abnormality of the thoracic aorta. Aortic valve leaflet calcifications. Lungs/pleura: Respiratory motion. Expiratory imaging. Multifocal bilateral heterogeneous opacities. Bilateral posterior lower lobe consolidations. Small bilateral pleural effusions. No pneumothorax. No endoluminal masslike lesion. Upper Abdomen: Limited images of the upper abdomen are unremarkable. Soft Tissues/Bones: Multilevel degenerative changes in the visualized spine. Chronic appearing mild multilevel vertebral body height loss in the thoracic spine. Diffusely heterogeneous marrow. Round 2.5 cm nodule in the subcutaneous soft tissues posterior to the left nipple demonstrates simple fluid attenuation and may represent a benign cyst.     1.  Suboptimal evaluation of the subsegmental and more peripheral pulmonary arteries due to motion artifact. Given this, no obvious acute pulmonary thromboembolic disease. Prominent main pulmonary artery suggests pulmonary hypertension. 2.  Multifocal bilateral heterogeneous pulmonary opacities. Findings may represent a combination of atelectasis and pulmonary edema. Multifocal pneumonia is an additional consideration. 3.  Small bilateral pleural effusions with associated lower lobe relaxation atelectasis. 4.  Round 2.5 cm nodule in the subcutaneous soft tissues posterior to the left nipple demonstrates simple fluid attenuation and may represent a benign cyst.  This could be further evaluated with focused ultrasound as warranted.        EKG  EKG were made to edit the dictations but occasionally words are mis-transcribed.)        Rey Cedeño MD  Resident  11/02/21 5406

## 2021-11-02 NOTE — ED NOTES
"""Start Artificial tears both eyes four times a day Labeled blood specimens sent to lab via tube system.     [] Lavender   [] on ice   [] Blue   [x] Green/yellow  [] Green/black [] on ice  [] Pink  [] Red  [] Yellow  [x] Blood Cultures      Marixa Patel RN  11/02/21 5969

## 2021-11-03 PROBLEM — N39.0 URINARY TRACT INFECTION ASSOCIATED WITH INDWELLING URETHRAL CATHETER (HCC): Status: RESOLVED | Noted: 2020-11-04 | Resolved: 2021-11-03

## 2021-11-03 PROBLEM — K43.0 INCISIONAL HERNIA WITH OBSTRUCTION BUT NO GANGRENE: Status: RESOLVED | Noted: 2018-05-20 | Resolved: 2021-11-03

## 2021-11-03 PROBLEM — R33.9 RETENTION, URINE: Status: RESOLVED | Noted: 2018-01-24 | Resolved: 2021-11-03

## 2021-11-03 PROBLEM — K56.609 SBO (SMALL BOWEL OBSTRUCTION) (HCC): Status: RESOLVED | Noted: 2018-05-19 | Resolved: 2021-11-03

## 2021-11-03 PROBLEM — N50.89 SCROTAL EDEMA: Status: RESOLVED | Noted: 2017-12-09 | Resolved: 2021-11-03

## 2021-11-03 PROBLEM — L03.116 CELLULITIS OF LEFT LOWER EXTREMITY: Status: RESOLVED | Noted: 2017-08-21 | Resolved: 2021-11-03

## 2021-11-03 PROBLEM — L03.115 CELLULITIS OF RIGHT LOWER EXTREMITY: Status: RESOLVED | Noted: 2017-08-19 | Resolved: 2021-11-03

## 2021-11-03 PROBLEM — F05 DELIRIUM DUE TO ANOTHER MEDICAL CONDITION: Status: RESOLVED | Noted: 2020-11-04 | Resolved: 2021-11-03

## 2021-11-03 PROBLEM — A41.59 KLEBSIELLA SEPSIS (HCC): Status: RESOLVED | Noted: 2021-10-14 | Resolved: 2021-11-03

## 2021-11-03 PROBLEM — T83.511A URINARY TRACT INFECTION ASSOCIATED WITH INDWELLING URETHRAL CATHETER (HCC): Status: RESOLVED | Noted: 2020-11-04 | Resolved: 2021-11-03

## 2021-11-03 PROBLEM — T38.3X1A: Status: RESOLVED | Noted: 2021-01-12 | Resolved: 2021-11-03

## 2021-11-03 LAB
-: ABNORMAL
ALBUMIN SERPL-MCNC: 2.8 G/DL (ref 3.5–5.2)
ALBUMIN/GLOBULIN RATIO: 0.7 (ref 1–2.5)
ALP BLD-CCNC: 149 U/L (ref 40–129)
ALT SERPL-CCNC: 11 U/L (ref 5–41)
AMORPHOUS: ABNORMAL
ANION GAP SERPL CALCULATED.3IONS-SCNC: 12 MMOL/L (ref 9–17)
AST SERPL-CCNC: 15 U/L
BACTERIA: ABNORMAL
BILIRUB SERPL-MCNC: 0.46 MG/DL (ref 0.3–1.2)
BILIRUBIN URINE: NEGATIVE
BUN BLDV-MCNC: 25 MG/DL (ref 6–20)
BUN/CREAT BLD: ABNORMAL (ref 9–20)
CALCIUM SERPL-MCNC: 8.5 MG/DL (ref 8.6–10.4)
CASTS UA: ABNORMAL /LPF (ref 0–8)
CHLORIDE BLD-SCNC: 95 MMOL/L (ref 98–107)
CHLORIDE, UR: 48 MMOL/L
CHP ED QC CHECK: NORMAL
CO2: 27 MMOL/L (ref 20–31)
COLOR: YELLOW
COMPLEMENT C3: 94 MG/DL (ref 90–180)
COMPLEMENT C4: 21 MG/DL (ref 10–40)
CREAT SERPL-MCNC: 3.8 MG/DL (ref 0.7–1.2)
CREATININE URINE: 94.2 MG/DL (ref 39–259)
CRYSTALS, UA: ABNORMAL /HPF
CULTURE: NO GROWTH
EPITHELIAL CELLS UA: ABNORMAL /HPF (ref 0–5)
FREE KAPPA/LAMBDA RATIO: 1.54 (ref 0.26–1.65)
GFR AFRICAN AMERICAN: 20 ML/MIN
GFR NON-AFRICAN AMERICAN: 17 ML/MIN
GFR SERPL CREATININE-BSD FRML MDRD: ABNORMAL ML/MIN/{1.73_M2}
GFR SERPL CREATININE-BSD FRML MDRD: ABNORMAL ML/MIN/{1.73_M2}
GLUCOSE BLD-MCNC: 108 MG/DL (ref 75–110)
GLUCOSE BLD-MCNC: 131 MG/DL (ref 75–110)
GLUCOSE BLD-MCNC: 137 MG/DL (ref 70–99)
GLUCOSE BLD-MCNC: 171 MG/DL
GLUCOSE BLD-MCNC: 171 MG/DL (ref 75–110)
GLUCOSE URINE: NEGATIVE
HCT VFR BLD CALC: 31.1 % (ref 40.7–50.3)
HEMOGLOBIN: 8.9 G/DL (ref 13–17)
INR BLD: 1.1
KAPPA FREE LIGHT CHAINS QNT: 10.51 MG/DL (ref 0.37–1.94)
KETONES, URINE: NEGATIVE
LACTIC ACID, WHOLE BLOOD: 1.3 MMOL/L (ref 0.7–2.1)
LAMBDA FREE LIGHT CHAINS QNT: 6.83 MG/DL (ref 0.57–2.63)
LEUKOCYTE ESTERASE, URINE: ABNORMAL
Lab: NORMAL
MAGNESIUM: 1.8 MG/DL (ref 1.6–2.6)
MCH RBC QN AUTO: 25.3 PG (ref 25.2–33.5)
MCHC RBC AUTO-ENTMCNC: 28.6 G/DL (ref 28.4–34.8)
MCV RBC AUTO: 88.4 FL (ref 82.6–102.9)
MUCUS: ABNORMAL
NITRITE, URINE: NEGATIVE
NRBC AUTOMATED: 0 PER 100 WBC
OSMOLALITY URINE: 235 MOSM/KG (ref 80–1300)
OTHER OBSERVATIONS UA: ABNORMAL
PDW BLD-RTO: 16.1 % (ref 11.8–14.4)
PH UA: 5 (ref 5–8)
PLATELET # BLD: 194 K/UL (ref 138–453)
PMV BLD AUTO: 11.5 FL (ref 8.1–13.5)
POTASSIUM SERPL-SCNC: 3.7 MMOL/L (ref 3.7–5.3)
POTASSIUM, UR: 10.3 MMOL/L
POTASSIUM, UR: 13.4 MMOL/L
PROTEIN UA: ABNORMAL
PROTHROMBIN TIME: 11.4 SEC (ref 9.1–12.3)
RBC # BLD: 3.52 M/UL (ref 4.21–5.77)
RBC UA: ABNORMAL /HPF (ref 0–4)
RENAL EPITHELIAL, UA: ABNORMAL /HPF
SODIUM BLD-SCNC: 134 MMOL/L (ref 135–144)
SODIUM,UR: 56 MMOL/L
SODIUM,UR: 61 MMOL/L
SPECIFIC GRAVITY UA: 1.01 (ref 1–1.03)
SPECIMEN DESCRIPTION: NORMAL
TOTAL PROTEIN, URINE: 28 MG/DL
TOTAL PROTEIN: 6.7 G/DL (ref 6.4–8.3)
TRICHOMONAS: ABNORMAL
TURBIDITY: CLEAR
URINE HGB: ABNORMAL
UROBILINOGEN, URINE: NORMAL
WBC # BLD: 5.2 K/UL (ref 3.5–11.3)
WBC UA: ABNORMAL /HPF (ref 0–5)
YEAST: ABNORMAL

## 2021-11-03 PROCEDURE — 2580000003 HC RX 258: Performed by: NURSE PRACTITIONER

## 2021-11-03 PROCEDURE — 84166 PROTEIN E-PHORESIS/URINE/CSF: CPT

## 2021-11-03 PROCEDURE — 83735 ASSAY OF MAGNESIUM: CPT

## 2021-11-03 PROCEDURE — 36415 COLL VENOUS BLD VENIPUNCTURE: CPT

## 2021-11-03 PROCEDURE — 86038 ANTINUCLEAR ANTIBODIES: CPT

## 2021-11-03 PROCEDURE — 82947 ASSAY GLUCOSE BLOOD QUANT: CPT

## 2021-11-03 PROCEDURE — 86160 COMPLEMENT ANTIGEN: CPT

## 2021-11-03 PROCEDURE — 6370000000 HC RX 637 (ALT 250 FOR IP): Performed by: NURSE PRACTITIONER

## 2021-11-03 PROCEDURE — 83935 ASSAY OF URINE OSMOLALITY: CPT

## 2021-11-03 PROCEDURE — 84155 ASSAY OF PROTEIN SERUM: CPT

## 2021-11-03 PROCEDURE — 6360000002 HC RX W HCPCS: Performed by: NURSE PRACTITIONER

## 2021-11-03 PROCEDURE — 83883 ASSAY NEPHELOMETRY NOT SPEC: CPT

## 2021-11-03 PROCEDURE — 99222 1ST HOSP IP/OBS MODERATE 55: CPT | Performed by: INTERNAL MEDICINE

## 2021-11-03 PROCEDURE — 86334 IMMUNOFIX E-PHORESIS SERUM: CPT

## 2021-11-03 PROCEDURE — 6370000000 HC RX 637 (ALT 250 FOR IP): Performed by: FAMILY MEDICINE

## 2021-11-03 PROCEDURE — 84156 ASSAY OF PROTEIN URINE: CPT

## 2021-11-03 PROCEDURE — 83605 ASSAY OF LACTIC ACID: CPT

## 2021-11-03 PROCEDURE — 85027 COMPLETE CBC AUTOMATED: CPT

## 2021-11-03 PROCEDURE — 81001 URINALYSIS AUTO W/SCOPE: CPT

## 2021-11-03 PROCEDURE — 84165 PROTEIN E-PHORESIS SERUM: CPT

## 2021-11-03 PROCEDURE — 99222 1ST HOSP IP/OBS MODERATE 55: CPT | Performed by: FAMILY MEDICINE

## 2021-11-03 PROCEDURE — 82570 ASSAY OF URINE CREATININE: CPT

## 2021-11-03 PROCEDURE — 86225 DNA ANTIBODY NATIVE: CPT

## 2021-11-03 PROCEDURE — 1200000000 HC SEMI PRIVATE

## 2021-11-03 PROCEDURE — 85610 PROTHROMBIN TIME: CPT

## 2021-11-03 PROCEDURE — 80053 COMPREHEN METABOLIC PANEL: CPT

## 2021-11-03 PROCEDURE — 84300 ASSAY OF URINE SODIUM: CPT

## 2021-11-03 PROCEDURE — 82436 ASSAY OF URINE CHLORIDE: CPT

## 2021-11-03 PROCEDURE — 84133 ASSAY OF URINE POTASSIUM: CPT

## 2021-11-03 RX ORDER — CLONAZEPAM 1 MG/1
1 TABLET ORAL 2 TIMES DAILY
Status: DISCONTINUED | OUTPATIENT
Start: 2021-11-03 | End: 2021-11-04 | Stop reason: HOSPADM

## 2021-11-03 RX ORDER — OXYCODONE HYDROCHLORIDE AND ACETAMINOPHEN 5; 325 MG/1; MG/1
1 TABLET ORAL EVERY 8 HOURS PRN
Status: DISCONTINUED | OUTPATIENT
Start: 2021-11-03 | End: 2021-11-04 | Stop reason: HOSPADM

## 2021-11-03 RX ORDER — OXYCODONE HYDROCHLORIDE AND ACETAMINOPHEN 5; 325 MG/1; MG/1
1 TABLET ORAL ONCE
Status: COMPLETED | OUTPATIENT
Start: 2021-11-03 | End: 2021-11-03

## 2021-11-03 RX ADMIN — OXYBUTYNIN CHLORIDE 5 MG: 5 TABLET ORAL at 09:05

## 2021-11-03 RX ADMIN — INSULIN LISPRO 2 UNITS: 100 INJECTION, SOLUTION INTRAVENOUS; SUBCUTANEOUS at 11:47

## 2021-11-03 RX ADMIN — PREGABALIN 150 MG: 75 CAPSULE ORAL at 21:36

## 2021-11-03 RX ADMIN — ASPIRIN 81 MG: 81 TABLET, COATED ORAL at 09:03

## 2021-11-03 RX ADMIN — TAMSULOSIN HYDROCHLORIDE 0.4 MG: 0.4 CAPSULE ORAL at 09:03

## 2021-11-03 RX ADMIN — HEPARIN SODIUM 5000 UNITS: 5000 INJECTION INTRAVENOUS; SUBCUTANEOUS at 20:50

## 2021-11-03 RX ADMIN — DULOXETINE HYDROCHLORIDE 60 MG: 30 CAPSULE, DELAYED RELEASE ORAL at 09:05

## 2021-11-03 RX ADMIN — SODIUM CHLORIDE, PRESERVATIVE FREE 10 ML: 5 INJECTION INTRAVENOUS at 00:10

## 2021-11-03 RX ADMIN — OXYCODONE HYDROCHLORIDE AND ACETAMINOPHEN 1 TABLET: 5; 325 TABLET ORAL at 01:06

## 2021-11-03 RX ADMIN — CLONAZEPAM 1 MG: 1 TABLET ORAL at 20:50

## 2021-11-03 RX ADMIN — CLONAZEPAM 1 MG: 1 TABLET ORAL at 11:16

## 2021-11-03 RX ADMIN — PREGABALIN 150 MG: 75 CAPSULE ORAL at 09:04

## 2021-11-03 RX ADMIN — LEVOTHYROXINE SODIUM 175 MCG: 175 TABLET ORAL at 06:08

## 2021-11-03 RX ADMIN — SODIUM CHLORIDE: 9 INJECTION, SOLUTION INTRAVENOUS at 13:37

## 2021-11-03 RX ADMIN — OXYBUTYNIN CHLORIDE 5 MG: 5 TABLET ORAL at 21:36

## 2021-11-03 RX ADMIN — SODIUM CHLORIDE, PRESERVATIVE FREE 10 ML: 5 INJECTION INTRAVENOUS at 20:55

## 2021-11-03 RX ADMIN — CEFTRIAXONE SODIUM 1000 MG: 1 INJECTION, POWDER, FOR SOLUTION INTRAMUSCULAR; INTRAVENOUS at 20:49

## 2021-11-03 RX ADMIN — HEPARIN SODIUM 5000 UNITS: 5000 INJECTION INTRAVENOUS; SUBCUTANEOUS at 06:08

## 2021-11-03 RX ADMIN — ACETAMINOPHEN 650 MG: 325 TABLET ORAL at 06:45

## 2021-11-03 RX ADMIN — QUETIAPINE FUMARATE 50 MG: 50 TABLET, EXTENDED RELEASE ORAL at 21:36

## 2021-11-03 RX ADMIN — HEPARIN SODIUM 5000 UNITS: 5000 INJECTION INTRAVENOUS; SUBCUTANEOUS at 13:41

## 2021-11-03 RX ADMIN — PANTOPRAZOLE SODIUM 40 MG: 40 TABLET, DELAYED RELEASE ORAL at 06:08

## 2021-11-03 RX ADMIN — OXYCODONE HYDROCHLORIDE AND ACETAMINOPHEN 1 TABLET: 5; 325 TABLET ORAL at 09:16

## 2021-11-03 RX ADMIN — INSULIN LISPRO 2 UNITS: 100 INJECTION, SOLUTION INTRAVENOUS; SUBCUTANEOUS at 17:23

## 2021-11-03 RX ADMIN — OXYCODONE HYDROCHLORIDE AND ACETAMINOPHEN 1 TABLET: 5; 325 TABLET ORAL at 21:36

## 2021-11-03 ASSESSMENT — ENCOUNTER SYMPTOMS
ABDOMINAL PAIN: 0
NAUSEA: 1
BLOOD IN STOOL: 0
CONSTIPATION: 0
SHORTNESS OF BREATH: 0
VOMITING: 0
WHEEZING: 0
STRIDOR: 0
DIARRHEA: 0
COUGH: 0

## 2021-11-03 ASSESSMENT — PAIN SCALES - GENERAL
PAINLEVEL_OUTOF10: 6
PAINLEVEL_OUTOF10: 8
PAINLEVEL_OUTOF10: 8
PAINLEVEL_OUTOF10: 7
PAINLEVEL_OUTOF10: 9
PAINLEVEL_OUTOF10: 4

## 2021-11-03 ASSESSMENT — PAIN DESCRIPTION - LOCATION
LOCATION: FOOT;LEG
LOCATION: LEG

## 2021-11-03 ASSESSMENT — PAIN DESCRIPTION - DESCRIPTORS: DESCRIPTORS: ACHING;BURNING

## 2021-11-03 ASSESSMENT — PAIN DESCRIPTION - PAIN TYPE
TYPE: ACUTE PAIN
TYPE: CHRONIC PAIN

## 2021-11-03 ASSESSMENT — PAIN DESCRIPTION - FREQUENCY: FREQUENCY: INTERMITTENT

## 2021-11-03 NOTE — ED NOTES
Pt resting in bed. Call light within reach. No needs at this time.      Fei Murdock RN  11/03/21 0021

## 2021-11-03 NOTE — ED NOTES
Patient provided yogurt, hospital bed ordered for patient for more comfort.       José Miguel Keen RN  11/03/21 5596

## 2021-11-03 NOTE — ED NOTES
Patient assisted to using phone, resting on cot, no distress noted, aox4.       Ryanne Heart, GERMAIN  11/03/21 1124

## 2021-11-03 NOTE — H&P
[facility]  Office: [address], , Fatmata Waller, DO, Cinthya Enriquez, DO, Leighton Mckeon Blood, DO, Carla Alvardao MD, Douglas Watson MD, Marii Barr MD, Pablo Echevarria MD, Shayy Bermudez MD, Alva Calvillo MD, Floresita Lerma MD, Arturo Monroe MD, Nadia Boswell DO, Luke Sullivan DO, Rasheed Naik MD,  Nadia Osman DO, Doroteo Morel MD, Elva Cruz MD, Russell Moy MD, Andrew Rossi MD, Iris Jiang MD, Gelacio Ross MD, Alferd Felty, CNP, [facility], CNP, Carter Cee, CNP, Ita Joyce, CNS, Saeed Hale, CNP, Mica Hope, CNP, Ida Manriquez, CNP, Minnie Mo, CNP, Ethyl Mercury, CNP, Nisha Fay, CNP, Albert Gonzalez PA-C, Sandy Rizo, DNP, Harley Live, CNP, Rodrigo Huerta, CNP, Patrick Arroyo, CNP, El Patterson, CNP, Mingo Bland, CNP, Ivy Neff, CNP, Kenny Pat, CNP         [address]    HISTORY AND PHYSICAL EXAMINATION            Date:   11/3/2021  Patient name:  Carlos Thorne  Date of admission:  11/2/2021  4:55 PM  MRN:   [id number]  Account:  [de-identified]  YOB: 1965  PCP:    Myriam Harris MD  Room:   19/19  Code Status:    Prior    Chief Complaint:     Chief Complaint   Patient presents with    Hypotension     Had SBP 70's yesterday, was 's per EMS       History Obtained From:     patient, -patient poor historian - electronic medical record    History of Present Illness:     Carlos Thorne is a 61 y.o.  Non- / non  male past medical history of urinary tract infection, type 2 diabetes, hyperlipidemia, prior alcohol abuse, depression, hypothyroidism, morbid obesity, bipolar disorder, anxiety, obstructive sleep apnea, venous insufficiency of bilateral lower extremities, history of alcoholic cirrhosis, history of portal hypertension, chronic dependence of supplemental oxygen, diastolic heart failure  who presents with Hypotension (Had SBP 70's yesterday, was 's per EMS)   and is admitted to the hospital for the management of <principal problem not specified>. Patient was sent to the emergency room from skilled nursing facility ([facility]) for the concern of hypotension. Patient does not have a chief complaint of reason for admission states that he was brought here from the facility \" because they sent me\" apparently per the emergency room patient was sent via EMS from the [facility] for the hypotension however blood pressure 120/51 upon arrival no acute concerns. Patient had a recent admission to the hospital for 8  days (10/21-10/29 ) For acute respiratory failure secondary to pulmonary edema. In which she was diuresed over 10 L but then developed an LEONARDA thought to be secondary to ATN secondary to hypotension from overdiuresis and Lasix was held. At that time creatinine was 2.72 and improved to 1.48 with IV fluids and was instructed to BMP in 3 days and resume Lasix at 40 mg and titrate as able.         Work up in the emergency room          Laboratories:   Metabolic panel. -Sodium 509, potassium 4.6, chloride 92, CO2 25, BUN 26 creatinine 4.62 lactic acid 3.1 glucose 134  GI/Liver Panel-alk phos 158 otherwise unremarkable  Hematology.-No acute leukocytosis hemoglobin 9.5 hematocrit 33.3  Coagulation-within normal limits  Cardiac Markers-proBNP 2339, high-sensitivity troponin 42 with a repeat of 33  EKG-   Urine-cloudy trace ketones moderate leukocyte Estrace small hemoglobin negative nitrite      Imaging and Diagnostics:   XR CHEST PORTABLE  No acute cardiopulmonary disease         Past Medical History:     Past Medical History:   Diagnosis Date    Anxiety and depression     Back pain, chronic     BPH (benign prostatic hyperplasia)     CHF (congestive heart failure) (HCC)     Cirrhosis (HCC)     Diabetes mellitus ([facility] [address]     not checking bloodsugar at home    GERD (gastroesophageal reflux disease)     H/O cardiac catheterization not sure of date    no stents    Hepatitis     Pt does not know the type.  Hiatal hernia     History of alcohol abuse     Sober since 2013    Hyperlipidemia     not taking any medication    Hypertension     IBS (irritable bowel syndrome)     Morbid obesity ([address]     Neuropathy     feet,toes    On home oxygen therapy     DME for home oxgygen at 2.5 lpm at HS and as needed    Pancreatitis     x 5 episodes    Primary osteoarthritis of right knee     Sleep apnea     suspected juany, never has had PSG study.   HAS NO MACHINE    Thyroid disease     Urinary bladder neurogenic dysfunction         Past Surgical History:     Past Surgical History:   Procedure Laterality Date    ABDOMEN SURGERY      hernia repair x4 (ventral)    COLONOSCOPY      2012    CYSTOSCOPY  01/24/2018    CYSTOSCOPY  02/20/2019    CYSTOSCOPY N/A 2/20/2019    CYSTOSCOPY DILATION performed by José Miguel Krause MD [facility] [address] [facility] 8/23/2019    CYSTOSCOPY/ DILATION NICOLE CATHETER EXCHANGE performed by José Miguel Krause MD at [address] [facility], DIAGNOSTIC     [address]  05/20/2018    repair and reduction of recurrent incarcerated incisional hernia with mesh    MS CYSTOURETHROSCOPY N/A 1/24/2018    CYSTOSCOPY [facility] performed by José Miguel Krause MD at [address] [facility] Bilateral 8/22/2017    FOOT [address] with bone bx performed by Hi Segal DPM at [address]  EGD TRANSORAL BIOPSY SINGLE/MULTIPLE N/A 7/19/2017    EGD BIOPSY performed by Tuyet Soni MD at [facility]  07/19/2017    polypectomy    UPPER GASTROINTESTINAL ENDOSCOPY N/A 3/14/2019    EGD BIOPSY performed by Desirae Fermin MD at [address] [facility] N/A 5/20/2018    REPAIR AND REDUCTION OF RECURRENT INCARCERATED INCISIONAL HERNIA WITH MESH performed by Alessandro Caban MD at [facility] Medications Prior to Admission:     Prior to Admission medications    Medication Sig Start Date End Date Taking? Authorizing Provider   clonazePAM (KLONOPIN) 0.5 MG tablet Take 1 tablet by mouth 2 times daily for 3 days. 10/29/21 11/1/21  Amparo Gavin PA-C   furosemide (LASIX) 40 MG tablet Take 1 tablet by mouth daily 10/29/21 11/28/21  Amparo Gavin PA-C   losartan (COZAAR) 50 MG tablet Take 1 tablet by mouth daily 10/30/21   Amparo Gavin PA-C   pregabalin (LYRICA) 150 MG capsule Take 150 mg by mouth 2 times daily. Historical Provider, MD   SITagliptin (JANUVIA) 100 MG tablet Take 100 mg by mouth daily    Historical Provider, MD   metFORMIN (GLUCOPHAGE) 500 MG tablet Take 500 mg by mouth 2 times daily (with meals)    Historical Provider, MD   nystatin (MYCOSTATIN) 925948 UNIT/GM cream Apply topically 2 times daily as needed (to skin folds)    Historical Provider, MD   QUEtiapine (SEROQUEL XR) 50 MG extended release tablet Take 50 mg by mouth nightly    Historical Provider, MD   triamcinolone (KENALOG) 0.025 % cream Apply topically 2 times daily as needed    Historical Provider, MD   diphenoxylate-atropine (LOMOTIL) 2.5-0.025 MG per tablet Take 1 tablet by mouth 4 times daily as needed for Diarrhea.     Historical Provider, MD   fluocinonide (LIDEX) 0.05 % external solution Apply topically as needed (scalp itching)    Historical Provider, MD   ketoconazole (NIZORAL) 2 % shampoo Apply topically Twice a Week    Historical Provider, MD   albuterol sulfate HFA (PROAIR HFA) 108 (90 Base) MCG/ACT inhaler Inhale 2 puffs into the lungs every 6 hours as needed for Wheezing    Historical Provider, MD   tamsulosin (FLOMAX) 0.4 MG capsule Take 1 capsule by mouth daily 1/24/18   Ximena Shultz MD   oxybutynin (DITROPAN) 5 MG tablet Take 1 tablet by mouth 2 times daily 12/12/17   Radha Rosales DO   aspirin 81 MG EC tablet Take 1 tablet by mouth daily 7/26/17   Jose Kennedy DO levothyroxine (SYNTHROID) 175 MCG tablet Take 175 mcg by mouth Daily     Historical Provider, MD   nystatin (MYCOSTATIN) 070242 UNIT/GM powder Apply topically 2 times daily as needed TO ABDOMINAL FOLDS, GROIN     Historical Provider, MD   esomeprazole (NEXIUM) 40 MG capsule Take 40 mg by mouth 2 times daily     Historical Provider, MD   DULoxetine (CYMBALTA) 60 MG capsule Take 60 mg by mouth every morning     Historical Provider, MD        Allergies:     No known allergies    Social History:     Tobacco:    reports that he has never smoked. He has never used smokeless tobacco.  Alcohol:      reports no history of alcohol use. Drug Use:  reports no history of drug use. Family History:     Family History   Adopted: Yes   Problem Relation Age of Onset    No Known Problems Mother         Adopted    No Known Problems Father         Adopted       Review of Systems:     Positive and Negative as described in HPI. Review of Systems   Reason unable to perform ROS: Underlying mental illness which impairs full validity of review of systems. When asked what brought him to the hospital he states \"the ambulance\" when asked what symptoms brought him to the emergency room he then \"\" I do not know why they sent me they jus. Constitutional: Negative for chills, diaphoresis and fever. HENT: Negative for congestion and hearing loss. Respiratory: Negative for cough, shortness of breath, wheezing and stridor. Home oxygen 3 L/min nasal cannula   Cardiovascular: Negative for chest pain, palpitations and leg swelling. Gastrointestinal: Positive for nausea (Chronic-the states because of my liver). Negative for abdominal pain, blood in stool, constipation, diarrhea and vomiting. Genitourinary: Negative for dysuria and frequency. Denies complications with Clemons catheter or discomfort   Musculoskeletal: Positive for gait problem. Negative for myalgias. Skin: Negative for rash.         Soreness at the left external nasal ridge from glasses   Neurological: Positive for weakness (Has not been walking at [facility]). Negative for dizziness, seizures and headaches. Psychiatric/Behavioral: The patient is not nervous/anxious. Physical Exam:   BP (!) 106/46   Pulse 69   Temp 97.7 °F (36.5 °C) (Oral)   Resp 19   Ht 5' 11\" (1.803 m)   Wt (!) 378 lb (171.5 kg)   SpO2 (!) 79%   BMI 52.72 kg/m²   Temp (24hrs), Av.7 °F (36.5 °C), Min:97.7 °F (36.5 °C), Max:97.7 °F (36.5 °C)    No results for input(s): POCGLU in the last 72 hours. Intake/Output Summary (Last 24 hours) at 2021 2302  Last data filed at 2021 1721  Gross per 24 hour   Intake    Output 210 ml   Net -210 ml       Physical Exam  Vitals and nursing note reviewed. Constitutional:       General: He is not in acute distress. Appearance: He is well-developed. He is morbidly obese. He is not ill-appearing or diaphoretic. HENT:      Head: Normocephalic and atraumatic. Right Ear: Hearing normal.      Left Ear: Hearing normal.      Nose: Nose normal. No rhinorrhea. Eyes:      General: Lids are normal.      Extraocular Movements:      Right eye: Normal extraocular motion. Left eye: Normal extraocular motion. Conjunctiva/sclera: Conjunctivae normal.      Right eye: Right conjunctiva is not injected. Left eye: Left conjunctiva is not injected. Pupils: Pupils are equal, round, and reactive to light. Pupils are equal.      Right eye: Pupil is reactive. Left eye: Pupil is reactive. Neck:      Thyroid: No thyromegaly. Vascular: No carotid bruit or JVD. Trachea: Trachea and phonation normal. No tracheal deviation. Cardiovascular:      Rate and Rhythm: Normal rate and regular rhythm. Pulses: Normal pulses. Heart sounds: Murmur heard. Systolic murmur is present. Pulmonary:      Effort: Pulmonary effort is normal. No respiratory distress. Breath sounds: No stridor.  Examination of the right-middle field reveals decreased breath sounds. Examination of the left-middle field reveals decreased breath sounds. Examination of the right-lower field reveals decreased breath sounds. Examination of the left-lower field reveals decreased breath sounds. Decreased breath sounds present. Abdominal:      General: Abdomen is protuberant. Bowel sounds are normal. There is no distension. Palpations: Abdomen is soft. There is hepatomegaly. There is no mass. Tenderness: There is no abdominal tenderness. There is no guarding. Genitourinary:     Comments: Clemons catheter in place  Musculoskeletal:         General: No tenderness. Cervical back: Neck supple. Right lower le+ Edema present. Left lower le+ Edema present. Comments: Generalized weakness to bilateral lower extremities  Left foot with outward deviation   Skin:     General: Skin is warm and dry. Findings: No erythema, lesion or rash. Comments: Venous stasis dermatitis bilateral lower extremities   Neurological:      General: No focal deficit present. Mental Status: He is alert and oriented to person, place, and time. He is not disoriented. GCS: GCS eye subscore is 4. GCS verbal subscore is 5. GCS motor subscore is 6. Cranial Nerves: No cranial nerve deficit. Comments: Poor historian however answers questions appropriately and answers alert and oriented x3   Psychiatric:         Attention and Perception: He is inattentive (Easily brought back to subject but then easily distracted). Mood and Affect: Mood normal.         Speech: Speech normal.         Behavior: Behavior normal. Behavior is cooperative.          Cognition and Memory: Cognition normal.         Investigations:      Laboratory Testing:  Recent Results (from the past 24 hour(s))   CBC Auto Differential    Collection Time: 21  5:17 PM   Result Value Ref Range    WBC 6.0 3.5 - 11.3 k/uL    RBC 3.81 (L) 4.21 - 5.77 m/uL Hemoglobin 9.5 (L) 13.0 - 17.0 g/dL    Hematocrit 33.3 (L) 40.7 - 50.3 %    MCV 87.4 82.6 - 102.9 fL    MCH 24.9 (L) 25.2 - 33.5 pg    MCHC 28.5 28.4 - 34.8 g/dL    RDW 16.2 (H) 11.8 - 14.4 %    Platelets 998 474 - 637 k/uL    MPV 11.6 8.1 - 13.5 fL    NRBC Automated 0.0 0.0 per 100 WBC    Differential Type NOT REPORTED     WBC Morphology NOT REPORTED     RBC Morphology ANISOCYTOSIS PRESENT     Platelet Estimate NOT REPORTED     Seg Neutrophils 66 (H) 36 - 65 %    Lymphocytes 19 (L) 24 - 43 %    Monocytes 10 3 - 12 %    Eosinophils % 4 1 - 4 %    Basophils 1 0 - 2 %    Immature Granulocytes 1 (H) 0 %    Segs Absolute 3.95 1.50 - 8.10 k/uL    Absolute Lymph # 1.11 1.10 - 3.70 k/uL    Absolute Mono # 0.60 0.10 - 1.20 k/uL    Absolute Eos # 0.22 0.00 - 0.44 k/uL    Basophils Absolute 0.04 0.00 - 0.20 k/uL    Absolute Immature Granulocyte 0.03 0.00 - 0.30 k/uL   Troponin    Collection Time: 11/02/21  5:17 PM   Result Value Ref Range    Troponin, High Sensitivity 42 (H) 0 - 22 ng/L    Troponin T NOT REPORTED <0.03 ng/mL    Troponin Interp NOT REPORTED    Brain Natriuretic Peptide    Collection Time: 11/02/21  5:17 PM   Result Value Ref Range    Pro-BNP 2,339 (H) <300 pg/mL    BNP Interpretation NOT REPORTED    Basic Metabolic Panel w/ Reflex to MG    Collection Time: 11/02/21  5:17 PM   Result Value Ref Range    Glucose 134 (H) 70 - 99 mg/dL    BUN 26 (H) 6 - 20 mg/dL    CREATININE 4.62 (H) 0.70 - 1.20 mg/dL    Bun/Cre Ratio NOT REPORTED 9 - 20    Calcium 8.6 8.6 - 10.4 mg/dL    Sodium 130 (L) 135 - 144 mmol/L    Potassium 4.6 3.7 - 5.3 mmol/L    Chloride 92 (L) 98 - 107 mmol/L    CO2 25 20 - 31 mmol/L    Anion Gap 13 9 - 17 mmol/L    GFR Non-African American 13 (L) >60 mL/min    GFR  16 (L) >60 mL/min    GFR Comment          GFR Staging NOT REPORTED    LACTIC ACID, WHOLE BLOOD    Collection Time: 11/02/21  5:17 PM   Result Value Ref Range    Lactic Acid, Whole Blood 3.1 (H) 0.7 - 2.1 mmol/L   Hepatic direct risk to patient if care not provided in a hospital setting. Expected length of stay > 48 hours.     SUNSHINE Chavez - CNP  11/3/2021  06:02 AM    Copy sent to Dr. Teressa Favre, MD

## 2021-11-03 NOTE — ED NOTES
Patient provided lunch tray and diet pradeep mist. Patient is aox4, no distress noted, aox4.       Meggan Hansen RN  11/03/21 4231

## 2021-11-03 NOTE — ED NOTES
Patient requesting his home dose of his clonopin 1mg, reports he takes this twice a day and topical medication for his \"itchy\" skin. Admitting team paged.       Tom Gold RN  11/03/21 8616

## 2021-11-03 NOTE — ED NOTES
Pt resting comfortably with eyes closed. Lights dimmed. Call light within reach. In NAD.      Héctor Whlean RN  11/03/21 5535

## 2021-11-03 NOTE — CARE COORDINATION
ZPt came from [facility] PP can go back pt seems unsure of why he is back at hospital. Had some medical issues

## 2021-11-03 NOTE — CONSULTS
Renal Consult Note    Patient :  Verna Collier; 61 y.o. MRN# [id number]  Location:  7229/5360-55  Attending:  Jose G Willis MD  Admit Date:  11/2/2021   Hospital Day: 1    Reason for Consult:     Asked by Dr Jose G Willis MD to see for LEONARDA/Elevated Creatinine. History Obtained From:     patient, electronic medical record    History of Present Illness:     Verna Collier; 61 y.o. male with PMH of urinary retention, chronic indwelling wells, rec UTI, type 2 diabetes, hyperlipidemia, prior alcohol abuse, depression, hypothyroidism, morbid obesity, bipolar disorder, anxiety, obstructive sleep apnea, venous insufficiency of bilateral lower extremities, alcoholic cirrhosis, portal hypertension, chronic dependence of supplemental oxygen, diastolic heart failure  who presents with Hypotension (Had SBP 70's yesterday, was 's per EMS). Apparently per the emergency room patient was sent via EMS from the [facility] for the hypotension however blood pressure 120/51 upon arrival no acute concerns. Patient had a recent admission to the hospital for 8  days (10/21-10/29 ) For   acute respiratory failure secondary to pulmonary edema. In which she was diuresed over 10 L but then developed an LEONARDA thought to be secondary to ATN secondary to hypotension from overdiuresis and Lasix was held. At that time creatinine was 2.72 and improved to 1.48 with IV fluids and was instructed to BMP in 3 days and resume Lasix at 40 mg and titrate as able. Today creatinine is 4.62 > 3.80( baseline creatinine 0.9 to 1.0). lactic acid was high on admission 3.8, received 1L LR bolus yesterday. check repeat lab. UA appears to be UTI. Started on IV Rocephin. Past History/Allergies? Social History:     Past Medical History:   Diagnosis Date    Anxiety and depression     Back pain, chronic     BPH (benign prostatic hyperplasia)     Cellulitis of left lower extremity 8/21/2017    Cellulitis of right lower extremity 8/19/2017    CHF (congestive heart failure) (HCC)     Cirrhosis (HCC)     Diabetes mellitus ([address]     not checking bloodsugar at home    Epididymoorchitis     GERD (gastroesophageal reflux disease)     H/O cardiac catheterization not sure of date    no stents    Hepatitis     Pt does not know the type.  Hiatal hernia     History of alcohol abuse     Sober since 2013    Hyperlipidemia     not taking any medication    Hypertension     IBS (irritable bowel syndrome)     Incisional hernia with obstruction but no gangrene 5/20/2018    Klebsiella sepsis ([address] 44/83/6984    Metabolic encephalopathy     Morbid obesity ([address]     Neuropathy     feet,toes    On home oxygen therapy     DME for home oxgygen at 2.5 lpm at HS and as needed    Pancreatitis     x 5 episodes    Poisoning by insulin and oral hypoglycemic (antidiabetic) drugs, accidental (unintentional), initial encounter 1/12/2021    Primary osteoarthritis of right knee     Retention, urine 1/24/2018    SBO (small bowel obstruction) ([address] 5/19/2018    Septic shock ([address]     Sleep apnea     suspected juany, never has had PSG study.   HAS NO MACHINE    Thyroid disease     Urinary bladder neurogenic dysfunction     Urinary tract infection associated with indwelling urethral catheter ([address] 11/4/2020       Past Surgical History:   Procedure Laterality Date    ABDOMEN SURGERY      hernia repair x4 (ventral)    COLONOSCOPY      2012    CYSTOSCOPY  01/24/2018    CYSTOSCOPY  02/20/2019    CYSTOSCOPY N/A 2/20/2019    CYSTOSCOPY DILATION performed by Ladonna Jeans, MD at [address] N/A 8/23/2019    CYSTOSCOPY/ DILATION NICOLE CATHETER EXCHANGE performed by Ladonna Jeans, MD at [address], DIAGNOSTIC     [facility]  05/20/2018    repair and reduction of recurrent incarcerated incisional hernia with mesh    CT CYSTOURETHROSCOPY N/A 1/24/2018    CYSTOSCOPY /DILATION performed by Ash Haque MD at [address] Bilateral 8/22/2017    FOOT DEBRIDEMENT INCISION AND DRAINAGE with bone bx performed by Gilbert Guthrie DPM at [address] EGD TRANSORAL BIOPSY SINGLE/MULTIPLE N/A 7/19/2017    EGD BIOPSY performed by Samantha Gonzalez MD at [place] [address]  07/19/2017    polypectomy    UPPER GASTROINTESTINAL ENDOSCOPY N/A 3/14/2019    EGD BIOPSY performed by Phylicia Calero MD at [facility] N/A 5/20/2018    REPAIR AND REDUCTION OF RECURRENT INCARCERATED INCISIONAL HERNIA WITH MESH performed by Amanda Zhou MD at [address]   Allergen Reactions    No Known Allergies        Social History     Socioeconomic History    Marital status:      Spouse name: Not on file    Number of children: Not on file    Years of education: Not on file    Highest education level: Not on file   Occupational History    Not on file   Tobacco Use    Smoking status: Never Smoker    Smokeless tobacco: Never Used   Vaping Use    Vaping Use: Never used   Substance and Sexual Activity    Alcohol use: No     Comment: quit drinking 2012    Drug use: No    Sexual activity: Not on file   Other Topics Concern    Not on file   Social History Narrative    Not on file     Social Determinants of Health     Financial Resource Strain:     Difficulty of Paying Living Expenses:    Food Insecurity:     Worried About Running Out of Food in the Last Year:     [address] in the Last Year:    Transportation Needs:     Lack of Transportation (Medical):      Lack of Transportation (Non-Medical):    Physical Activity:     Days of Exercise per Week:     Minutes of Exercise per Session:    Stress:     Feeling of Stress :    Social Connections:     Frequency of Communication with Friends and Family:     Frequency of Social Gatherings with Friends and Family:     Attends L[employer] Services:     Active TO ABDOMINAL FOLDS, GROIN   esomeprazole (NEXIUM) 40 MG capsule, Take 40 mg by mouth 2 times daily   DULoxetine (CYMBALTA) 60 MG capsule, Take 60 mg by mouth every morning     Current Medications:     Scheduled Meds:    clonazePAM  1 mg Oral BID    aspirin  81 mg Oral Daily    pantoprazole  40 mg Oral QAM AC    DULoxetine  60 mg Oral QAM    levothyroxine  175 mcg Oral Daily    oxybutynin  5 mg Oral BID    pregabalin  150 mg Oral BID    QUEtiapine  50 mg Oral Nightly    tamsulosin  0.4 mg Oral Daily    sodium chloride flush  5-40 mL IntraVENous 2 times per day    insulin lispro  0-12 Units SubCUTAneous TID WC    insulin lispro  0-6 Units SubCUTAneous Nightly    heparin (porcine)  5,000 Units SubCUTAneous 3 times per day    cefTRIAXone (ROCEPHIN) IV  1,000 mg IntraVENous Q24H     Continuous Infusions:    sodium chloride 75 mL/hr at 11/03/21 1337    sodium chloride      dextrose       PRN Meds:  oxyCODONE-acetaminophen, albuterol sulfate HFA, diphenoxylate-atropine, sodium chloride flush, sodium chloride, ondansetron **OR** ondansetron, acetaminophen **OR** acetaminophen, glucose, dextrose, glucagon (rDNA), dextrose    Review of Systems:     Constitutional: No fever, no chills, no lethargy, no weakness. HEENT:  No headache, otalgia, itchy eyes, nasal discharge or sore throat. Cardiac:  No chest pain, dyspnea, orthopnea or PND. Chest:   No cough, phlegm or wheezing. On 2L NC at home. Abdomen:  No abdominal pain, nausea or vomiting. Neuro:  No focal weakness, abnormal movements or seizure like activity. Skin:   No rashes, no itching. :   No hematuria, no pyuria, no dysuria, no flank pain. Extremities:  No swelling or joint pains. Decreased sensation in bilateral feet, chronic complaint as per patient. ROS was otherwise negative except as mentioned in the [address]      Input/Output:       I/O last 3 completed shifts:  In: -   Out: 210 [Urine:210]    Vital Signs:   Temperature:  Temp: 97.9 °F (36.6 °C)  TMax:   Temp (24hrs), Av.8 °F (36.6 °C), Min:97.7 °F (36.5 °C), Max:97.9 °F (36.6 °C)    Respirations:  Resp: 20  Pulse:   Pulse: 76  BP:    BP: (!) 118/96  BP Range: Systolic (13QOI), TLM:071 , Min:73 , UIA:430       Diastolic (78NQK), TJF:87, Min:45, Max:96      Physical Examination:     General:  Alert and awake x3  HEENT: Tenderness over maxillary sinuses eyes:   Pupils equal, round and reactive to light, EOMI. Neck:   Supple  Chest:   Bilaterally decreased breath sounds, can be secondary to body habitus. no rales or wheezes. Cardiac:  S1 S2 RR, no murmurs, gallops or rubs. Abdomen: Soft, non-tender, no masses or organomegaly, BS audible. :   No suprapubic or flank tenderness. Neuro:  AAO x 3, appears to have tangential speech  SKIN:  No rashes, poor skin turgor   Extremities:  No edema.      Labs:       Recent Labs     21   WBC 6.0 5.2   RBC 3.81* 3.52*   HGB 9.5* 8.9*   HCT 33.3* 31.1*   MCV 87.4 88.4   MCH 24.9* 25.3   MCHC 28.5 28.6   RDW 16.2* 16.1*    194   MPV 11.6 11.5      BMP:   Recent Labs     21  1120   * 134*  --    K 4.6 3.7  --    CL 92* 95*  --    CO2 25 27  --    BUN 26* 25*  --    CREATININE 4.62* 3.80*  --    GLUCOSE 134* 137* 171   CALCIUM 8.6 8.5*  --     Magnesium:    Recent Labs     21  06   MG 1.8     Albumin:    Recent Labs     21  1822 21   LABALBU 2.9* 2.5* 2.8*     BNP:      Lab Results   Component Value Date    BNP NOT REPORTED 2014     MARISA:      Lab Results   Component Value Date    MARISA NEGATIVE 2014     SPEP:  Lab Results   Component Value Date    PROT 6.7 2021   Hep BsAg:         Lab Results   Component Value Date    HEPBSAG NONREACTIVE 2014     Hep C AB:          Lab Results   Component Value Date    HEPCAB NONREACTIVE 2014       Urinalysis/Chemistries:      Lab Results   Component Value Date    NITRU NEGATIVE 2021 drugs/contrast exposure. Thank you for the consultation. Please do not hesitate to contact us for any further questions/concerns. We will continue to follow along with you. Meet Rocha MD  Internal Medicine, PGY2  Nephrology service  Attending Physician Statement  I have discussed the care of Luz Larson, including pertinent history and exam findings with the resident. I have reviewed the key elements of all parts of the encounter with the resident. Note was updated and recorded changes were made I have seen and examined the patient with the resident. I agree with the assessment and plan and status of the problem list as documented. Patient was presented from nursing home for low blood pressure. His blood pressure improved by the time he came to ER. However found to have an elevated BUN and creatinine. Most likely cause of acute renal failure is prerenal azotemia further complicated by angiotensin receptor blocker. Patient was started on IV fluid at 75 mL/h after a fluid bolus and there has been a significant improvement in creatinine last 24 hours. Patient has a normal baseline creatinine  Addiitionally I recommend check serum protein electrophoresis  Spot urine for protein and creatinine  Free light chain ratio  Continue IV fluids for now  Repeat BMP in a.m. Lori Henning MD    This note is created with the assistance of a speech-recognition program. While intending to generate a document that actually reflects the content of the visit, no guarantees can be provided that every mistake has been identified and corrected by editing.

## 2021-11-03 NOTE — ED NOTES
Patient placed in hospital bed, repositioned, patient had large bowel movement. Patient brief and linens changed. Patient is aox4. New blankets provided.       Denisha Balderrama RN  11/03/21 1019

## 2021-11-03 NOTE — ED NOTES
Error \" Made in mistake, pt remains in the ED in room 19.   06:34 ED PATIENT DEPARTURE ED Patient Departure - Record the time the patient physically departed the emergency department.: Departed           Keon Aguilera RN  11/03/21 0901

## 2021-11-03 NOTE — ED NOTES
Pt resting on cot, on monitor, eyes closed, non labored RR. No distress noted.       Jaime Morocho, RN  11/03/21 1184

## 2021-11-03 NOTE — CARE COORDINATION
Case Management Initial Discharge Plan  Michelle Kaufman,             Met with:patient to discuss discharge plans. Information verified: address, contacts, phone number, , insurance Yes  Insurance Provider: medicare and [facility]     Emergency Contact/Next of Kin name & number: per face sheet Zachariah Francis and Margo   Who are involved in patient's support system? Children     PCP: Heath Meza MD  Date of last visit: not sure       Discharge Planning    Living Arrangements:    did live with son but came from [address] [place] [address] has =0 stories  0 stairs to climb to get into front door, 0stairs to climb to reach second floor  Location of bedroom/bathroom in home main    Patient able to perform ADL's:Assisted    Current Services (outpatient & in home) [facility]   DME equipment:   DME provider:     Is patient receiving oral anticoagulation therapy? No    If indicated:   Physician managing anticoagulation treatment:   Where does patient obtain lab work for ATC treatment? Potential Assistance Needed:       Patient agreeable to home care: No/ he is if home   Anita of choice provided:  no    Prior SNF/Rehab Placement and Facility: current [facility]   Agreeable to SNF/Rehab: Yes wants to go home but think he realizes he needs too much assistance encouraging him to get rehab first there and then go home  Freedom of choice provided: no     Evaluation: no    Expected Discharge date:       Patient expects to be discharged to: If home: is the family and/or caregiver wiling & able to provide support at home? Son has aides as well   Who will be providing this support?  above*    Follow Up Appointment: Best Day/ Time:      Transportation provider: needs ambulance to get home stretcher and then wheelchair if going into his house   Transportation arrangements needed for discharge: Yes    Readmission Risk              Risk of Unplanned Readmission:  43             Does patient have a readmission risk score greater than 14?: Yes  If yes, follow-up appointment must be made within 7 days of discharge.      Goals of Care:       Educated pt  on transitional options, provided freedom of choice and are agreeable with plan      Discharge Plan: return to snf to be able to go home     11 spoke with admissions patient able to return when ready      Electronically signed by Davonte Cox RN on 11/3/21 at 10:38 AM EDT

## 2021-11-04 VITALS
HEART RATE: 72 BPM | SYSTOLIC BLOOD PRESSURE: 132 MMHG | HEIGHT: 71 IN | BODY MASS INDEX: 44.1 KG/M2 | RESPIRATION RATE: 17 BRPM | WEIGHT: 315 LBS | DIASTOLIC BLOOD PRESSURE: 57 MMHG | TEMPERATURE: 97.7 F | OXYGEN SATURATION: 92 %

## 2021-11-04 LAB
ALBUMIN (CALCULATED): 3.3 G/DL (ref 3.2–5.2)
ALBUMIN PERCENT: 51 % (ref 45–65)
ALPHA 1 PERCENT: 3 % (ref 3–6)
ALPHA 2 PERCENT: 9 % (ref 6–13)
ALPHA-1-GLOBULIN: 0.2 G/DL (ref 0.1–0.4)
ALPHA-2-GLOBULIN: 0.6 G/DL (ref 0.5–0.9)
ANION GAP SERPL CALCULATED.3IONS-SCNC: 14 MMOL/L (ref 9–17)
ANTI DNA DOUBLE STRANDED: 2.9 IU/ML
ANTI-NUCLEAR ANTIBODY (ANA): NEGATIVE
BETA GLOBULIN: 1 G/DL (ref 0.5–1.1)
BETA PERCENT: 15 % (ref 11–19)
BUN BLDV-MCNC: 18 MG/DL (ref 6–20)
BUN/CREAT BLD: ABNORMAL (ref 9–20)
CALCIUM SERPL-MCNC: 9 MG/DL (ref 8.6–10.4)
CHLORIDE BLD-SCNC: 98 MMOL/L (ref 98–107)
CO2: 26 MMOL/L (ref 20–31)
CREAT SERPL-MCNC: 1.8 MG/DL (ref 0.7–1.2)
EKG ATRIAL RATE: 65 BPM
EKG P AXIS: 67 DEGREES
EKG P-R INTERVAL: 150 MS
EKG Q-T INTERVAL: 468 MS
EKG QRS DURATION: 120 MS
EKG QTC CALCULATION (BAZETT): 486 MS
EKG R AXIS: -27 DEGREES
EKG T AXIS: 39 DEGREES
EKG VENTRICULAR RATE: 65 BPM
ENA ANTIBODIES SCREEN: 0.4 U/ML
GAMMA GLOBULIN %: 23 % (ref 9–20)
GAMMA GLOBULIN: 1.5 G/DL (ref 0.5–1.5)
GFR AFRICAN AMERICAN: 47 ML/MIN
GFR NON-AFRICAN AMERICAN: 39 ML/MIN
GFR SERPL CREATININE-BSD FRML MDRD: ABNORMAL ML/MIN/{1.73_M2}
GFR SERPL CREATININE-BSD FRML MDRD: ABNORMAL ML/MIN/{1.73_M2}
GLUCOSE BLD-MCNC: 130 MG/DL (ref 75–110)
GLUCOSE BLD-MCNC: 142 MG/DL (ref 75–110)
GLUCOSE BLD-MCNC: 147 MG/DL (ref 70–99)
GLUCOSE BLD-MCNC: 163 MG/DL (ref 75–110)
HCT VFR BLD CALC: 32.7 % (ref 40.7–50.3)
HEMOGLOBIN: 9.3 G/DL (ref 13–17)
MCH RBC QN AUTO: 25.3 PG (ref 25.2–33.5)
MCHC RBC AUTO-ENTMCNC: 28.4 G/DL (ref 28.4–34.8)
MCV RBC AUTO: 89.1 FL (ref 82.6–102.9)
NRBC AUTOMATED: 0 PER 100 WBC
PATHOLOGIST: ABNORMAL
PATHOLOGIST: NORMAL
PDW BLD-RTO: 15.9 % (ref 11.8–14.4)
PLATELET # BLD: 172 K/UL (ref 138–453)
PMV BLD AUTO: 10.7 FL (ref 8.1–13.5)
POTASSIUM SERPL-SCNC: 3.8 MMOL/L (ref 3.7–5.3)
PROTEIN ELECTROPHORESIS, SERUM: ABNORMAL
RBC # BLD: 3.67 M/UL (ref 4.21–5.77)
SARS-COV-2, RAPID: NOT DETECTED
SERUM IFX INTERP: NORMAL
SODIUM BLD-SCNC: 138 MMOL/L (ref 135–144)
SPECIMEN DESCRIPTION: NORMAL
TOTAL PROT. SUM,%: 101 % (ref 98–102)
TOTAL PROT. SUM: 6.6 G/DL (ref 6.3–8.2)
TOTAL PROTEIN: 6.6 G/DL (ref 6.4–8.3)
WBC # BLD: 3.6 K/UL (ref 3.5–11.3)

## 2021-11-04 PROCEDURE — 82947 ASSAY GLUCOSE BLOOD QUANT: CPT

## 2021-11-04 PROCEDURE — 99232 SBSQ HOSP IP/OBS MODERATE 35: CPT | Performed by: INTERNAL MEDICINE

## 2021-11-04 PROCEDURE — 87635 SARS-COV-2 COVID-19 AMP PRB: CPT

## 2021-11-04 PROCEDURE — 6360000002 HC RX W HCPCS: Performed by: NURSE PRACTITIONER

## 2021-11-04 PROCEDURE — 85027 COMPLETE CBC AUTOMATED: CPT

## 2021-11-04 PROCEDURE — 80048 BASIC METABOLIC PNL TOTAL CA: CPT

## 2021-11-04 PROCEDURE — 6370000000 HC RX 637 (ALT 250 FOR IP): Performed by: FAMILY MEDICINE

## 2021-11-04 PROCEDURE — 2580000003 HC RX 258: Performed by: NURSE PRACTITIONER

## 2021-11-04 PROCEDURE — 6370000000 HC RX 637 (ALT 250 FOR IP): Performed by: INTERNAL MEDICINE

## 2021-11-04 PROCEDURE — 36415 COLL VENOUS BLD VENIPUNCTURE: CPT

## 2021-11-04 PROCEDURE — 99239 HOSP IP/OBS DSCHRG MGMT >30: CPT | Performed by: INTERNAL MEDICINE

## 2021-11-04 PROCEDURE — 6370000000 HC RX 637 (ALT 250 FOR IP): Performed by: NURSE PRACTITIONER

## 2021-11-04 RX ORDER — CLONAZEPAM 0.5 MG/1
0.5 TABLET ORAL 2 TIMES DAILY
Qty: 6 TABLET | Refills: 0 | Status: ON HOLD | OUTPATIENT
Start: 2021-11-04 | End: 2022-01-17 | Stop reason: SDUPTHER

## 2021-11-04 RX ORDER — OXYCODONE HYDROCHLORIDE AND ACETAMINOPHEN 5; 325 MG/1; MG/1
1 TABLET ORAL EVERY 6 HOURS PRN
Qty: 12 TABLET | Refills: 0 | Status: SHIPPED | OUTPATIENT
Start: 2021-11-04 | End: 2021-11-07

## 2021-11-04 RX ORDER — CEPHALEXIN 500 MG/1
500 CAPSULE ORAL 2 TIMES DAILY
Qty: 10 CAPSULE | Refills: 0 | DISCHARGE
Start: 2021-11-04 | End: 2021-11-09

## 2021-11-04 RX ORDER — DIPHENHYDRAMINE HCL 25 MG
25 TABLET ORAL EVERY 6 HOURS PRN
Status: DISCONTINUED | OUTPATIENT
Start: 2021-11-04 | End: 2021-11-04 | Stop reason: HOSPADM

## 2021-11-04 RX ADMIN — OXYCODONE HYDROCHLORIDE AND ACETAMINOPHEN 1 TABLET: 5; 325 TABLET ORAL at 13:05

## 2021-11-04 RX ADMIN — ASPIRIN 81 MG: 81 TABLET, COATED ORAL at 09:36

## 2021-11-04 RX ADMIN — CLONAZEPAM 1 MG: 1 TABLET ORAL at 09:45

## 2021-11-04 RX ADMIN — HEPARIN SODIUM 5000 UNITS: 5000 INJECTION INTRAVENOUS; SUBCUTANEOUS at 14:58

## 2021-11-04 RX ADMIN — ACETAMINOPHEN 325 MG: 325 TABLET ORAL at 09:45

## 2021-11-04 RX ADMIN — SODIUM CHLORIDE, PRESERVATIVE FREE 5 ML: 5 INJECTION INTRAVENOUS at 09:37

## 2021-11-04 RX ADMIN — OXYCODONE HYDROCHLORIDE AND ACETAMINOPHEN 1 TABLET: 5; 325 TABLET ORAL at 05:29

## 2021-11-04 RX ADMIN — HEPARIN SODIUM 5000 UNITS: 5000 INJECTION INTRAVENOUS; SUBCUTANEOUS at 05:30

## 2021-11-04 RX ADMIN — PREGABALIN 150 MG: 75 CAPSULE ORAL at 09:36

## 2021-11-04 RX ADMIN — DULOXETINE HYDROCHLORIDE 60 MG: 30 CAPSULE, DELAYED RELEASE ORAL at 09:35

## 2021-11-04 RX ADMIN — LEVOTHYROXINE SODIUM 175 MCG: 175 TABLET ORAL at 06:36

## 2021-11-04 RX ADMIN — SODIUM CHLORIDE: 9 INJECTION, SOLUTION INTRAVENOUS at 02:30

## 2021-11-04 RX ADMIN — PANTOPRAZOLE SODIUM 40 MG: 40 TABLET, DELAYED RELEASE ORAL at 05:29

## 2021-11-04 RX ADMIN — DIPHENHYDRAMINE HCL 25 MG: 25 TABLET ORAL at 13:06

## 2021-11-04 RX ADMIN — INSULIN LISPRO 2 UNITS: 100 INJECTION, SOLUTION INTRAVENOUS; SUBCUTANEOUS at 13:06

## 2021-11-04 RX ADMIN — TAMSULOSIN HYDROCHLORIDE 0.4 MG: 0.4 CAPSULE ORAL at 09:36

## 2021-11-04 RX ADMIN — OXYBUTYNIN CHLORIDE 5 MG: 5 TABLET ORAL at 09:36

## 2021-11-04 ASSESSMENT — PAIN SCALES - GENERAL
PAINLEVEL_OUTOF10: 7
PAINLEVEL_OUTOF10: 6
PAINLEVEL_OUTOF10: 3
PAINLEVEL_OUTOF10: 6

## 2021-11-04 ASSESSMENT — PAIN DESCRIPTION - DESCRIPTORS: DESCRIPTORS: ACHING;BURNING;CONSTANT;DISCOMFORT

## 2021-11-04 ASSESSMENT — PAIN DESCRIPTION - ORIENTATION: ORIENTATION: RIGHT;LEFT

## 2021-11-04 ASSESSMENT — PAIN DESCRIPTION - ONSET: ONSET: ON-GOING

## 2021-11-04 ASSESSMENT — PAIN DESCRIPTION - LOCATION: LOCATION: FOOT;GENERALIZED;LEG

## 2021-11-04 ASSESSMENT — PAIN DESCRIPTION - PAIN TYPE: TYPE: CHRONIC PAIN

## 2021-11-04 ASSESSMENT — PAIN DESCRIPTION - FREQUENCY: FREQUENCY: CONTINUOUS

## 2021-11-04 NOTE — PLAN OF CARE
Problem: Pain:  Goal: Pain level will decrease  Description: Pain level will decrease  Outcome: Ongoing  Goal: Control of acute pain  Description: Control of acute pain  Outcome: Ongoing  Goal: Control of chronic pain  Description: Control of chronic pain  Outcome: Ongoing     Problem: Falls - Risk of:  Goal: Will remain free from falls  Description: Will remain free from falls  Outcome: Completed  Goal: Absence of physical injury  Description: Absence of physical injury  Outcome: Completed     Problem: Skin Integrity:  Goal: Will show no infection signs and symptoms  Description: Will show no infection signs and symptoms  Outcome: Completed  Goal: Absence of new skin breakdown  Description: Absence of new skin breakdown  Outcome: Completed

## 2021-11-04 NOTE — CARE COORDINATION
Discharge [facility] Case Management Department  Written by: Ranjit Hugo RN    Patient Name: [facility]  Attending Provider: Radha Johnson MD  Admit Date: 2021  4:55 PM  MRN: [id number]  Account: [de-identified]                     : 1965  Discharge Date: 2021      [facility]    Ranjit Hugo RN

## 2021-11-04 NOTE — PROGRESS NOTES
[facility]  Office: [address], DO, Dyan Prophet, DO, Shereen Talbot, DO, Arsalan Reyes Blood, DO, Irene Coronado MD, Beatrice Brock MD, Josias Simeon MD, Danial Marcos MD, Elli Uriostegui MD, Dennis Kelley MD, Alda Rizzo MD, Kesha Ramírez MD, Karin Santos, DO, Porfirio Ramírez, DO, Flower Cantrell MD,  Claudetta Shilling, DO, Sadia Romero MD, Elizabeth Simeon MD, Sabine Otero MD, Josefina Bledsoe MD, Cezar Rosas MD, Anne Moreno MD, Dandy Diaz, CNP, [facility], CNP, Jackie Collier, CNP, Lance Wahl, CNS, aPrish Davies, CNP, Reid Cheng, CNP, Marleny White, CNP, Allyn Serrano, CNP, Gita Carr, CNP, Christy Gonzalez, CNP, Saran Ramirez PA-C, Benedicto Booker, [facility], Carter Hudson, CNP, Curtis Reddy, CNP, Jade Marino, CNP, Leti Sales, CNP, Zandra Cortes, CNP, Telly Sibley, CNP, Deepika Garland, [address] [place] [address]    Progress Note    11/4/2021    2:56 PM    Name:   Angela Padilla  MRN:     [id number]     Acct:      [de-identified]   Room:   [address] [place] [address] Day:  2  Admit Date:  11/2/2021  4:55 PM    PCP:   James Farley MD  Code Status:  Full Code    Subjective:     C/C:   Chief Complaint   Patient presents with    Hypotension     Had SBP 70's yesterday, was 's per EMS     Interval History Status: improved. Pt appears to be lethargic, baseline mental status not known. Complains of bilateral blurry vision. Possibly a complication of his diabetes. Improvement in BUN (26 ->18) and Cr (4.62 ->1.8) levels after IV hydration. Brief History:     Pt is a 61 y.o. man brought to the ED from SNF with a 1 day hx of hypotension. He states that his SBP was running in the 70s-80s. On arrival to the ED, his BP is 120/51. Bilateral lower limb swelling noted on presentation. Pt states that it is a chronic issue. Admitted for LEONARDA superimposed on CKD. Has a chronic indwelling Clemons catheter.      Pt was discharged on 10/29 after an 8 day hospital admission for ARDS secondary pulmonary edema. While admitted, he developed LEONARDA thought to be secondary to overdiuresis with lasix. Review of Systems:     Constitutional:  negative for chills, fevers, sweats  Respiratory:  negative for cough, dyspnea on exertion, shortness of breath, wheezing  Cardiovascular:  negative for chest pain, chest pressure/discomfort, lower extremity edema, palpitations  Gastrointestinal:  negative for abdominal pain, constipation, diarrhea, nausea, vomiting  Neurological:  negative for dizziness, headache    Medications: Allergies:     Allergies   Allergen Reactions    No Known Allergies        Current Meds:   Scheduled Meds:    clonazePAM  1 mg Oral BID    aspirin  81 mg Oral Daily    pantoprazole  40 mg Oral QAM AC    DULoxetine  60 mg Oral QAM    levothyroxine  175 mcg Oral Daily    oxybutynin  5 mg Oral BID    pregabalin  150 mg Oral BID    QUEtiapine  50 mg Oral Nightly    tamsulosin  0.4 mg Oral Daily    sodium chloride flush  5-40 mL IntraVENous 2 times per day    insulin lispro  0-12 Units SubCUTAneous TID WC    insulin lispro  0-6 Units SubCUTAneous Nightly    heparin (porcine)  5,000 Units SubCUTAneous 3 times per day    cefTRIAXone (ROCEPHIN) IV  1,000 mg IntraVENous Q24H     Continuous Infusions:    sodium chloride 75 mL/hr at 11/04/21 0230    sodium chloride      dextrose       PRN Meds: diphenhydrAMINE, oxyCODONE-acetaminophen, albuterol sulfate HFA, diphenoxylate-atropine, sodium chloride flush, sodium chloride, ondansetron **OR** ondansetron, acetaminophen **OR** acetaminophen, glucose, dextrose, glucagon (rDNA), dextrose    Data:     Past Medical History:   has a past medical history of Anxiety and depression, Back pain, chronic, BPH (benign prostatic hyperplasia), Cellulitis of left lower extremity, Cellulitis of right lower extremity, CHF (congestive heart failure) ([facility] [address] Cirrhosis ([facility] [address] Diabetes mellitus ([facility] [address] Epididymoorchitis, GERD (gastroesophageal reflux disease), H/O cardiac catheterization, Hepatitis, Hiatal hernia, History of alcohol abuse, Hyperlipidemia, Hypertension, IBS (irritable bowel syndrome), Incisional hernia with obstruction but no gangrene, Klebsiella sepsis ([facility] [address] Metabolic encephalopathy, Morbid obesity ([facility] [address] Neuropathy, On home oxygen therapy, Pancreatitis, Poisoning by insulin and oral hypoglycemic (antidiabetic) drugs, accidental (unintentional), initial encounter, Primary osteoarthritis of right knee, Retention, urine, SBO (small bowel obstruction) ([facility] [address] Septic shock ([facility] [address] Sleep apnea, Thyroid disease, Urinary bladder neurogenic dysfunction, and Urinary tract infection associated with indwelling urethral catheter ([facility] [address] Social History:   reports that he has never smoked. He has never used smokeless tobacco. He reports that he does not drink alcohol and does not use drugs. Family History:   Family History   Adopted: Yes   Problem Relation Age of Onset    No Known Problems Mother         Adopted    No Known Problems Father         Adopted       Vitals:  BP (!) 132/57   Pulse 72   Temp 97.7 °F (36.5 °C) (Oral)   Resp 17   Ht 5' 11\" (1.803 m)   Wt (!) 364 lb 14.4 oz (165.5 kg)   SpO2 92%   BMI 50.89 kg/m²   Temp (24hrs), Av.9 °F (36.6 °C), Min:97.7 °F (36.5 °C), Max:98.2 °F (36.8 °C)    Recent Labs     21  0811 21  1231   POCGLU 142* 131* 130* 163*       I/O (24Hr):     Intake/Output Summary (Last 24 hours) at 2021 1456  Last data filed at 2021 1123  Gross per 24 hour   Intake 735 ml   Output 3500 ml   Net -2765 ml       Labs:  Hematology:  Recent Labs     21  0627 21  0840   WBC 6.0 5.2 3.6   RBC 3.81* 3.52* 3.67*   HGB 9.5* 8.9* 9.3*   HCT 33.3* 31.1* 32.7*   MCV 87.4 88.4 89.1   MCH 24.9* 25.3 25.3   MCHC 28.5 28.6 28.4   RDW 16.2* 16.1* 15.9*    194 172   MPV 11.6 11.5 10.7   INR 1.0 1.1  -- Chemistry:  Recent Labs     11/02/21 1717 11/02/21 1717 11/02/21  1822 11/03/21  0627 11/03/21  1120 11/03/21  1612 11/04/21  0840   *  --   --  134*  --   --  138   K 4.6  --   --  3.7  --   --  3.8   CL 92*  --   --  95*  --   --  98   CO2 25  --   --  27  --   --  26   GLUCOSE 134*   < >  --  137* 171  --  147*   BUN 26*  --   --  25*  --   --  18   CREATININE 4.62*  --   --  3.80*  --   --  1.80*   MG  --   --   --  1.8  --   --   --    ANIONGAP 13  --   --  12  --   --  14   LABGLOM 13*  --   --  17*  --   --  39*   GFRAA 16*  --   --  20*  --   --  47*   CALCIUM 8.6  --   --  8.5*  --   --  9.0   PROBNP 2,339*  --   --   --   --   --   --    TROPHS 42*  --  33*  --   --   --   --    LACTACIDWB 3.1*  --   --   --   --  1.3  --     < > = values in this interval not displayed. Recent Labs     11/02/21 1717 11/02/21 1717 11/02/21 1822 11/03/21  0007 11/03/21  0627 11/03/21  1118 11/03/21  1612 11/03/21  1714 11/03/21  2021 11/04/21  0811 11/04/21  1231   PROT 7.1   < > 5.4*  --  6.7  --  6.6  --   --   --   --    LABALBU 2.9*  --  2.5*  --  2.8*  --   --   --   --   --   --    AST 18  --  19  --  15  --   --   --   --   --   --    ALT 13  --  11  --  11  --   --   --   --   --   --    ALKPHOS 158*  --  128  --  149*  --   --   --   --   --   --    BILITOT 0.45  --  0.35  --  0.46  --   --   --   --   --   --    BILIDIR 0.20  --  0.13  --   --   --   --   --   --   --   --    POCGLU  --   --   --  108  --  171*  --  142* 131* 130* 163*    < > = values in this interval not displayed.      ABG:  Lab Results   Component Value Date    POCPH 7.415 10/28/2021    PHART 7.330 06/18/2014    POCPCO2 49.9 10/28/2021    GXJ3HEA 68.0 06/18/2014    POCPO2 162.3 10/28/2021    PO2ART 84.0 06/18/2014    POCHCO3 32.0 10/28/2021    QRU6ISB 34.9 06/18/2014    NBEA NOT REPORTED 10/28/2021    PBEA 6 10/28/2021    SLQ0SDT NOT REPORTED 10/28/2021    TZVZ7JZO 100 10/28/2021    Y2FNMZJT 96.5 06/18/2014    FIO2 60.0 10/28/2021     Lab Results   Component Value Date/Time    SPECIAL LAC, 10ML 11/02/2021 06:45 PM    SPECIAL RAC, 10ML 11/02/2021 06:45 PM     Lab Results   Component Value Date/Time    CULTURE NO GROWTH 2 DAYS 11/02/2021 06:45 PM    CULTURE NO GROWTH 2 DAYS 11/02/2021 06:45 PM       Radiology:  XR CHEST PORTABLE    Result Date: 11/2/2021  No acute cardiopulmonary disease       Physical Examination:        General appearance:  alert, cooperative and no distress, obese, on NC  Mental Status:  oriented to person, place and time and normal affect  Lungs:  clear to auscultation bilaterally, normal effort  Heart:  regular rate and rhythm  Abdomen:  soft, nontender, nondistended, normal bowel sounds  Extremities:  no edema, redness, tenderness in the calves  Skin:  no gross lesions, rashes, induration    Assessment:        Hospital Problems         Last Modified POA    * (Principal) Acute kidney injury superimposed on chronic kidney disease ([facility] [address] 11/3/2021 Yes    Hyponatremia 11/3/2021 Yes    Type 2 diabetes mellitus with diabetic neuropathy ([address] (Chronic) 11/3/2021 Yes    Morbid obesity with BMI of 50.0-59.9, adult ([facility] [address] 11/3/2021 Yes    Chronic indwelling Clemons catheter (Chronic) 60/0/2913 Yes    Diastolic congestive heart failure ([facility] [address] 11/3/2021 Yes    Urinary bladder neurogenic dysfunction 11/3/2021 Yes          Plan:        LEONARDA superimposed on CKD    -Nephrology consulted    -IV hydration with lactated ringer solution shows improvement in kidney function. BUN (26 ->18) and Cr (4.62 ->1.8)    -IV rocephin    -U/A shows elevated lambda (10.51) and kappa (6.83) light chains. Protein electrophoresis and immunofixation profile ordered to determine kidney pathology. -F/U on protein electrophoresis and immunofixation profile   -Continue to monitor kidney function and BP. Hyponatriemia    -Resolved. -Current serum sodium is 138.   -Monitor serum electrolytes.        Type II DM with diabetic neuropathy    -Managing with insulin lispro and glucagon PRN. Morbid Obesity with BMI of 50.0-59.9    -on a low carb and low diet     Chronic Indwelling Wells catheter    -Wells catheter shows transparent yellow urine. -11/3 U/A: cloudy dark urine positive for ketones, 1+proteinuria, moderate leukocyte esterase, and small amount of urine Hb. Suggests UTI. Given IV rocephin. -11/4 UTI resolved. U/A shows trace protein and leukocyte esterase negative, small amounts Hb. Diastolic CHF    -CXR shows mildly enlarged cardiac silhouette and mild ectasia of thoracic aorta.    -preserved EF    -pt on chronic O2 supplementation, 3-4L NC.    -Respiratory therapy evaluation pending to assess respiratory status. Urinary bladder neurogenic dysfunction   -chronic wells catheter    -Receiving oxybutynin 5mg      Christal Clines  11/4/2021  2:56 PM       Attending Supervising Johnna Peña, Rufino Lazcano MD, have seen and examined Jackie Blind and personally performed and participated in the key or critical portions of the evaluation and management including personally performing the exam and medical decision making. I verify the accuracy of the documentation by the medical student with the following additions/changes. Additional Comments: no issues overnight, LEONARDA resolved, cleared by nephrology.  DC planning back to facility on PO abx     Electronically signed by Rufino Lazcano MD on 11/4/2021 at 5:50 PM

## 2021-11-04 NOTE — PROGRESS NOTES
Physician Progress Note      Robert Fletcher  JULIO #:                  [id number]  :                       1965  ADMIT DATE:       2021 4:55 PM  DISCH DATE:  RESPONDING  PROVIDER #:        LUDA SAMPSON        QUERY TEXT:    Stage of Chronic Kidney Disease: Please provide further specificity, if known. Clinical indicators include: brain natriuretic peptide, pro-bnp, bnp, bun,   creatinine, gfr, ckd, probnp, chronic kidney disease  Options provided:  -- Chronic kidney disease stage 1  -- Chronic kidney disease stage 2  -- Chronic kidney disease stage 3  -- Chronic kidney disease stage 3a  -- Chronic kidney disease stage 3b  -- Chronic kidney disease stage 4  -- Chronic kidney disease stage 5  -- Chronic kidney disease stage 5, requiring dialysis  -- End stage renal disease  -- Other - I will add my own diagnosis  -- Disagree - Not applicable / Not valid  -- Disagree - Clinically Unable to determine / Unknown        PROVIDER RESPONSE TEXT:    The patient has chronic kidney disease stage 3b.       Electronically signed by:  Shameka Calero 2021 11:02 AM

## 2021-11-04 NOTE — PROGRESS NOTES
Renal Progress Note    Patient :  Marivel Conner; 61 y.o. MRN# [id number]  Location:  5204/5724-84  Attending:  Pete Longoria MD  Admit Date:  11/2/2021   Hospital Day: 2      Subjective:     No acute events overnight. Creatinine improved significantly 3.8 > 1.8. lactic acidosis improved. Serum free light chain ratio not elevated. C3,C4 WNLs. Outpatient Medications:     Medications Prior to Admission: clonazePAM (KLONOPIN) 0.5 MG tablet, Take 1 tablet by mouth 2 times daily for 3 days. furosemide (LASIX) 40 MG tablet, Take 1 tablet by mouth daily  losartan (COZAAR) 50 MG tablet, Take 1 tablet by mouth daily  pregabalin (LYRICA) 150 MG capsule, Take 150 mg by mouth 2 times daily. SITagliptin (JANUVIA) 100 MG tablet, Take 100 mg by mouth daily  metFORMIN (GLUCOPHAGE) 500 MG tablet, Take 500 mg by mouth 2 times daily (with meals)  nystatin (MYCOSTATIN) 066437 UNIT/GM cream, Apply topically 2 times daily as needed (to skin folds)  QUEtiapine (SEROQUEL XR) 50 MG extended release tablet, Take 50 mg by mouth nightly  triamcinolone (KENALOG) 0.025 % cream, Apply topically 2 times daily as needed  diphenoxylate-atropine (LOMOTIL) 2.5-0.025 MG per tablet, Take 1 tablet by mouth 4 times daily as needed for Diarrhea.   fluocinonide (LIDEX) 0.05 % external solution, Apply topically as needed (scalp itching)  ketoconazole (NIZORAL) 2 % shampoo, Apply topically Twice a Week  albuterol sulfate HFA (PROAIR HFA) 108 (90 Base) MCG/ACT inhaler, Inhale 2 puffs into the lungs every 6 hours as needed for Wheezing  tamsulosin (FLOMAX) 0.4 MG capsule, Take 1 capsule by mouth daily  oxybutynin (DITROPAN) 5 MG tablet, Take 1 tablet by mouth 2 times daily  aspirin 81 MG EC tablet, Take 1 tablet by mouth daily  levothyroxine (SYNTHROID) 175 MCG tablet, Take 175 mcg by mouth Daily   nystatin (MYCOSTATIN) 995937 UNIT/GM powder, Apply topically 2 times daily as needed TO ABDOMINAL FOLDS, GROIN   esomeprazole (NEXIUM) 40 MG capsule, Take 40 mg by mouth 2 times daily   DULoxetine (CYMBALTA) 60 MG capsule, Take 60 mg by mouth every morning     Current Medications:     Scheduled Meds:    clonazePAM  1 mg Oral BID    aspirin  81 mg Oral Daily    pantoprazole  40 mg Oral QAM AC    DULoxetine  60 mg Oral QAM    levothyroxine  175 mcg Oral Daily    oxybutynin  5 mg Oral BID    pregabalin  150 mg Oral BID    QUEtiapine  50 mg Oral Nightly    tamsulosin  0.4 mg Oral Daily    sodium chloride flush  5-40 mL IntraVENous 2 times per day    insulin lispro  0-12 Units SubCUTAneous TID WC    insulin lispro  0-6 Units SubCUTAneous Nightly    heparin (porcine)  5,000 Units SubCUTAneous 3 times per day    cefTRIAXone (ROCEPHIN) IV  1,000 mg IntraVENous Q24H     Continuous Infusions:    sodium chloride 75 mL/hr at 21 0230    sodium chloride      dextrose       PRN Meds:  diphenhydrAMINE, oxyCODONE-acetaminophen, albuterol sulfate HFA, diphenoxylate-atropine, sodium chloride flush, sodium chloride, ondansetron **OR** ondansetron, acetaminophen **OR** acetaminophen, glucose, dextrose, glucagon (rDNA), dextrose    Input/Output:       I/O last 3 completed shifts: In: 375 [I.V.:375]  Out: 7300 [Urine:7300]. Patient Vitals for the past 96 hrs (Last 3 readings):   Weight   21 1231 (!) 364 lb 14.4 oz (165.5 kg)   21 1656 (!) 378 lb (171.5 kg)       Vital Signs:   Temperature:  Temp: 97.7 °F (36.5 °C)  TMax:   Temp (24hrs), Av.9 °F (36.6 °C), Min:97.7 °F (36.5 °C), Max:98.2 °F (36.8 °C)    Respirations:  Resp: 17  Pulse:   Pulse: 72  BP:    BP: (!) 132/57  BP Range: Systolic (63RKM), AED:195 , Min:114 , FMI:810       Diastolic (05AAE), KLZ:97, Min:57, Max:75      Physical Examination:     General:          Alert and awake x3  HEENT:           Tenderness over maxillary sinuses eyes:                  Pupils equal, round and reactive to light, EOMI.   Neck:               Supple  Chest:              Bilaterally decreased breath sounds, can be secondary to body habitus. no rales or wheezes. Cardiac:           S1 S2 RR, no murmurs, gallops or rubs. Abdomen:        Soft, non-tender, no masses or organomegaly, BS audible. :                  No suprapubic or flank tenderness. Neuro:             AAO x 3, appears to have tangential speech  SKIN:               No rashes, poor skin turgor   Extremities:     No edema. Labs:       Recent Labs     11/02/21 1717 11/03/21 0627 11/04/21  0840   WBC 6.0 5.2 3.6   RBC 3.81* 3.52* 3.67*   HGB 9.5* 8.9* 9.3*   HCT 33.3* 31.1* 32.7*   MCV 87.4 88.4 89.1   MCH 24.9* 25.3 25.3   MCHC 28.5 28.6 28.4   RDW 16.2* 16.1* 15.9*    194 172   MPV 11.6 11.5 10.7      BMP:   Recent Labs     11/02/21 1717 11/02/21 1717 11/03/21 0627 11/03/21  1120 11/04/21  0840   *  --  134*  --  138   K 4.6  --  3.7  --  3.8   CL 92*  --  95*  --  98   CO2 25  --  27  --  26   BUN 26*  --  25*  --  18   CREATININE 4.62*  --  3.80*  --  1.80*   GLUCOSE 134*   < > 137* 171 147*   CALCIUM 8.6  --  8.5*  --  9.0    < > = values in this interval not displayed. Phosphorus:   No results for input(s): PHOS in the last 72 hours.   Magnesium:    Recent Labs     11/03/21  0627   MG 1.8     Albumin:    Recent Labs     11/02/21 1717 11/02/21  1822 11/03/21  0627   LABALBU 2.9* 2.5* 2.8*     BNP:      Lab Results   Component Value Date    BNP NOT REPORTED 06/18/2014     MARISA:      Lab Results   Component Value Date    MARISA NEGATIVE 06/19/2014     SPEP:  Lab Results   Component Value Date    PROT 6.6 11/03/2021    ALBCAL PENDING 11/03/2021    ALBPCT PENDING 11/03/2021    LABALPH PENDING 11/03/2021    LABALPH PENDING 11/03/2021    A1PCT PENDING 11/03/2021    A2PCT PENDING 11/03/2021    LABBETA PENDING 11/03/2021    BETAPCT PENDING 11/03/2021    GAMGLOB PENDING 11/03/2021    GGPCT PENDING 11/03/2021    PATH PENDING 11/03/2021    PATH PENDING 11/03/2021     UPEP:     Lab Results   Component Value Date    LABPE PENDING 11/03/2021 C3:     Lab Results   Component Value Date    C3 94 11/03/2021     C4:     Lab Results   Component Value Date    C4 21 11/03/2021     MPO ANCA:   No results found for: MPO  PR3 ANCA:   No results found for: PR3  Anti-GBM:   No results found for: GBMABIGG  Hep BsAg:         Lab Results   Component Value Date    HEPBSAG NONREACTIVE 06/19/2014     Hep C AB:          Lab Results   Component Value Date    HEPCAB NONREACTIVE 06/19/2014       Urinalysis/Chemistries:      Lab Results   Component Value Date    NITRU NEGATIVE 11/03/2021    COLORU Yellow 11/03/2021    PHUR 5.0 11/03/2021    WBCUA 5 TO 10 11/03/2021    RBCUA 2 TO 5 11/03/2021    MUCUS NOT REPORTED 11/03/2021    TRICHOMONAS NOT REPORTED 11/03/2021    YEAST NOT REPORTED 11/03/2021    BACTERIA NOT REPORTED 11/03/2021    CLARITYU slightly cloudy 05/04/2016    SPECGRAV 1.009 11/03/2021    LEUKOCYTESUR TRACE 11/03/2021    UROBILINOGEN Normal 11/03/2021    BILIRUBINUR NEGATIVE 11/03/2021    BILIRUBINUR neg 05/04/2016    BILIRUBINUR 3+ 08/30/2011    BLOODU small 05/04/2016    GLUCOSEU NEGATIVE 11/03/2021    GLUCOSEU 1+ 08/30/2011    KETUA NEGATIVE 11/03/2021    AMORPHOUS NOT REPORTED 11/03/2021     Urine Sodium:     Lab Results   Component Value Date    TICO 56 11/03/2021     Urine Potassium:    Lab Results   Component Value Date    KUR 10.3 11/03/2021     Urine Chloride:    Lab Results   Component Value Date    CLUR 48 11/03/2021     Urine Osmolarity:   Lab Results   Component Value Date    OSMOU 235 11/03/2021     Urine Protein:   No components found for: TOTALPROTEIN, URINE   Urine Creatinine:     Lab Results   Component Value Date    LABCREA 94.2 11/03/2021     Urine Eosinophils:  No components found for: UEOS    Radiology:     Reviewed. Assessment:     1. Acute renal failure most likely due to prerenal azotemia, hypoperfusion state due to low blood pressure further complicated by use of losartan. His creatinine is improving with hydration.   After reaching a peak of 4.6 mg/dL   2. Atonic bladder and patient has chronic indwelling Clemons catheter   3. HFpEF - stable - on chronic supplemental oxygen 3-4L. 4. Obesity  5. T2DM  6.  Mild hyponatremia   7. UTI - On Rocephin  8. Anemia of chronic disease    Plan:   1. Renal function improved with IV hydration  2. Continue to hold lasix  3. Okay to be discharged to SNF  4. Repeat bmp in 3 days and can reststart lasix if creatinine is back to baseline    Plan to be discussed with Dr. Laura Zeng    Nutrition   Please ensure that patient is on a renal diet/TF. Avoid nephrotoxic drugs/contrast exposure. We will continue to follow along with you. Agusto Nieves, PGY2  Internal medicine  Nehprology service    This note is created with the assistance of a speech-recognition program. While intending to generate a document that actually reflects the content of the visit, no guarantees can be provided that every mistake has been identified and corrected by editing. Attending Physician Statement  I have discussed the care of Day Nevarez, including pertinent history and exam findings,  with the Fellow/Residentt. I have reviewed the key elements of all parts of the encounter with the Fellow/ Resident. I agree with the assessment, plan and orders as documented by the resident.     Michael Conner MD MD, MRCP Navdeep Ortega, FACP   11/4/2021 3:19 PM    Nephrology [address] [place] [address]

## 2021-11-05 LAB — GLUCOSE BLD-MCNC: 122 MG/DL (ref 75–110)

## 2021-11-05 NOTE — DISCHARGE SUMMARY
70s-80s. On arrival to the ED, his BP is 120/51. Bilateral lower limb swelling noted on presentation. Pt states that it is a chronic issue. Admitted for LEONARDA superimposed on CKD. Has a chronic indwelling Clemons catheter.      Nephrology consulted, treated with IVF with resolution of LEONARDA. DC back to SNF.        Significant therapeutic interventions: as above     Significant Diagnostic Studies:   Labs / Micro:  CBC:   Lab Results   Component Value Date    WBC 3.6 11/04/2021    RBC 3.67 11/04/2021    RBC 3.93 04/02/2012    HGB 9.3 11/04/2021    HCT 32.7 11/04/2021    MCV 89.1 11/04/2021    MCH 25.3 11/04/2021    MCHC 28.4 11/04/2021    RDW 15.9 11/04/2021     11/04/2021     04/02/2012     BMP:    Lab Results   Component Value Date    GLUCOSE 147 11/04/2021    GLUCOSE 102 08/30/2011     11/04/2021    K 3.8 11/04/2021    CL 98 11/04/2021    CO2 26 11/04/2021    ANIONGAP 14 11/04/2021    BUN 18 11/04/2021    CREATININE 1.80 11/04/2021    BUNCRER NOT REPORTED 11/04/2021    CALCIUM 9.0 11/04/2021    LABGLOM 39 11/04/2021    GFRAA 47 11/04/2021    GFR      11/04/2021    GFR NOT REPORTED 11/04/2021     HFP:    Lab Results   Component Value Date    PROT 6.6 11/03/2021     CMP:    Lab Results   Component Value Date    GLUCOSE 147 11/04/2021    GLUCOSE 102 08/30/2011     11/04/2021    K 3.8 11/04/2021    CL 98 11/04/2021    CO2 26 11/04/2021    BUN 18 11/04/2021    CREATININE 1.80 11/04/2021    ANIONGAP 14 11/04/2021    ALKPHOS 149 11/03/2021    ALT 11 11/03/2021    AST 15 11/03/2021    BILITOT 0.46 11/03/2021    LABALBU 2.8 11/03/2021    LABALBU 4.0 03/30/2012    ALBUMIN 0.7 11/03/2021    LABGLOM 39 11/04/2021    GFRAA 47 11/04/2021    GFR      11/04/2021    GFR NOT REPORTED 11/04/2021    PROT 6.6 11/03/2021    CALCIUM 9.0 11/04/2021     PT/INR:    Lab Results   Component Value Date    PROTIME 11.4 11/03/2021    PROTIME 11.6 03/30/2012    INR 1.1 11/03/2021     PTT:   Lab Results   Component Value Date APTT 25.5 11/02/2021     FLP:    Lab Results   Component Value Date    CHOL 248 11/10/2020    TRIG 231 11/10/2020    HDL 40 11/10/2020     U/A:    Lab Results   Component Value Date    COLORU Yellow 11/03/2021    TURBIDITY Clear 11/03/2021    SPECGRAV 1.009 11/03/2021    HGBUR SMALL 11/03/2021    PHUR 5.0 11/03/2021    PROTEINU TRACE 11/03/2021    GLUCOSEU NEGATIVE 11/03/2021    GLUCOSEU 1+ 08/30/2011    KETUA NEGATIVE 11/03/2021    BILIRUBINUR NEGATIVE 11/03/2021    BILIRUBINUR neg 05/04/2016    BILIRUBINUR 3+ 08/30/2011    UROBILINOGEN Normal 11/03/2021    NITRU NEGATIVE 11/03/2021    LEUKOCYTESUR TRACE 11/03/2021     TSH:    Lab Results   Component Value Date    TSH 16.83 10/21/2021        Radiology:  XR CHEST PORTABLE    Result Date: 11/2/2021  No acute cardiopulmonary disease       Consultations:    Consults:     Final Specialist Recommendations/Findings:   IP CONSULT TO HOSPITALIST  IP CONSULT TO NEPHROLOGY      The patient was seen and examined on day of discharge and this discharge summary is in conjunction with any daily progress note from day of discharge. Discharge plan:     Disposition: Home    Physician Follow Up:     Santiago Nieves MD  [address] [place] [address]  [phone]    In 2 weeks  get labs prior to follow up    Pablo Salcedo MD  [address] [facility]  [phone]             Requiring Further Evaluation/Follow Up POST HOSPITALIZATION/Incidental Findings:     Diet: regular diet    Activity: As tolerated    Instructions to Patient: follow up with pcp     Discharge Medications:      Medication List      START taking these medications    cephALEXin 500 MG capsule  Commonly known as: KEFLEX  Take 1 capsule by mouth 2 times daily for 5 days     oxyCODONE-acetaminophen 5-325 MG per tablet  Commonly known as: PERCOCET  Take 1 tablet by mouth every 6 hours as needed for Pain for up to 3 days.         CHANGE how you take these medications    * clonazePAM 0.5 MG tablet  Commonly known as: KLONOPIN  Take 1 tablet by mouth 2 times daily for 3 days. What changed: Another medication with the same name was added. Make sure you understand how and when to take each. * clonazePAM 0.5 MG tablet  Commonly known as: KLONOPIN  Take 1 tablet by mouth 2 times daily for 3 days. What changed: You were already taking a medication with the same name, and this prescription was added. Make sure you understand how and when to take each. * This list has 2 medication(s) that are the same as other medications prescribed for you. Read the directions carefully, and ask your doctor or other care provider to review them with you.             CONTINUE taking these medications    aspirin 81 MG EC tablet  Take 1 tablet by mouth daily     diphenoxylate-atropine 2.5-0.025 MG per tablet  Commonly known as: LOMOTIL     DULoxetine 60 MG extended release capsule  Commonly known as: CYMBALTA     esomeprazole 40 MG delayed release capsule  Commonly known as: NEXIUM     fluocinonide 0.05 % external solution  Commonly known as: LIDEX     furosemide 40 MG tablet  Commonly known as: LASIX  Take 1 tablet by mouth daily     Januvia 100 MG tablet  Generic drug: SITagliptin     ketoconazole 2 % shampoo  Commonly known as: NIZORAL     levothyroxine 175 MCG tablet  Commonly known as: SYNTHROID     losartan 50 MG tablet  Commonly known as: COZAAR  Take 1 tablet by mouth daily     * nystatin [id number] UNIT/GM cream  Commonly known as: MYCOSTATIN     * nystatin [id number] UNIT/GM powder  Commonly known as: MYCOSTATIN     oxybutynin 5 MG tablet  Commonly known as: DITROPAN  Take 1 tablet by mouth 2 times daily     pregabalin 150 MG capsule  Commonly known as: LYRICA     ProAir  (90 Base) MCG/ACT inhaler  Generic drug: albuterol sulfate HFA     QUEtiapine 50 MG extended release tablet  Commonly known as: SEROQUEL XR     tamsulosin 0.4 MG capsule  Commonly known as: Flomax  Take 1 capsule by mouth daily triamcinolone 0.025 % cream  Commonly known as: KENALOG         * This list has 2 medication(s) that are the same as other medications prescribed for you. Read the directions carefully, and ask your doctor or other care provider to review them with you. STOP taking these medications    metFORMIN 500 MG tablet  Commonly known as: GLUCOPHAGE           Where to Get Your Medications      You can get these medications from any pharmacy    Bring a paper prescription for each of these medications  clonazePAM 0.5 MG tablet  oxyCODONE-acetaminophen 5-325 MG per tablet     Information about where to get these medications is not yet available    Ask your nurse or doctor about these medications  cephALEXin 500 MG capsule         Discharge Procedure Orders   Basic Metabolic Panel   Standing Status: Future Standing Exp. Date: 11/08/21   Order Comments: Fax to [phone] dr Vaishali Holman       Time Spent on discharge is  35 mins in patient examination, evaluation, counseling as well as medication reconciliation, prescriptions for required medications, discharge plan and follow up. Electronically signed by   Rodney Monterroso MD  11/5/2021  12:43 PM      Thank you Dr. Shane Smith MD for the opportunity to be involved in this patient's care.

## 2021-11-05 NOTE — PROGRESS NOTES
Microbiology called to verify Covid rapid test results. Test result was negative. Negative result reported to transport.

## 2021-11-08 LAB
CULTURE: NORMAL
CULTURE: NORMAL
Lab: NORMAL
Lab: NORMAL
P E INTERPRETATION, U: NORMAL
PATHOLOGIST: NORMAL
SPECIMEN DESCRIPTION: NORMAL
SPECIMEN DESCRIPTION: NORMAL
SPECIMEN TYPE: NORMAL
URINE TOTAL PROTEIN: 9 MG/DL

## 2021-11-08 NOTE — CARE COORDINATION
BPCI-A Medical Bundle Patient:  Working DRG: renal failure-   Admitting Location: [facility]   Adm Date: 11/2/2021  Date of Discharge: 11/4/2021 (found after discharge     Bundle End Date: 2/2/2022    90-day post-acute outreach and tracking with qualifying DRG.        Found after discharge (discharged to [place] [id number]

## 2022-01-12 ENCOUNTER — HOSPITAL ENCOUNTER (EMERGENCY)
Age: 57
Discharge: HOME | DRG: 698 | End: 2022-01-13
Attending: EMERGENCY MEDICINE
Payer: MEDICARE

## 2022-01-12 DIAGNOSIS — T83.9XXA PROBLEM WITH FOLEY CATHETER, INITIAL ENCOUNTER (HCC): Primary | ICD-10-CM

## 2022-01-12 LAB
ABSOLUTE EOS #: 0.28 K/UL (ref 0–0.44)
ABSOLUTE IMMATURE GRANULOCYTE: 0.04 K/UL (ref 0–0.3)
ABSOLUTE LYMPH #: 1.31 K/UL (ref 1.1–3.7)
ABSOLUTE MONO #: 0.49 K/UL (ref 0.1–1.2)
BASOPHILS # BLD: 1 % (ref 0–2)
BASOPHILS ABSOLUTE: 0.05 K/UL (ref 0–0.2)
DIFFERENTIAL TYPE: ABNORMAL
EOSINOPHILS RELATIVE PERCENT: 3 % (ref 1–4)
GLUCOSE BLD-MCNC: 160 MG/DL (ref 75–110)
HCT VFR BLD CALC: 35 % (ref 40.7–50.3)
HEMOGLOBIN: 10.6 G/DL (ref 13–17)
IMMATURE GRANULOCYTES: 0 %
LYMPHOCYTES # BLD: 13 % (ref 24–43)
MCH RBC QN AUTO: 26.2 PG (ref 25.2–33.5)
MCHC RBC AUTO-ENTMCNC: 30.3 G/DL (ref 28.4–34.8)
MCV RBC AUTO: 86.6 FL (ref 82.6–102.9)
MONOCYTES # BLD: 5 % (ref 3–12)
NRBC AUTOMATED: 0 PER 100 WBC
PDW BLD-RTO: 16 % (ref 11.8–14.4)
PLATELET # BLD: 134 K/UL (ref 138–453)
PLATELET ESTIMATE: ABNORMAL
PMV BLD AUTO: 10.9 FL (ref 8.1–13.5)
RBC # BLD: 4.04 M/UL (ref 4.21–5.77)
RBC # BLD: ABNORMAL 10*6/UL
SEG NEUTROPHILS: 78 % (ref 36–65)
SEGMENTED NEUTROPHILS ABSOLUTE COUNT: 7.65 K/UL (ref 1.5–8.1)
WBC # BLD: 9.8 K/UL (ref 3.5–11.3)
WBC # BLD: ABNORMAL 10*3/UL

## 2022-01-12 PROCEDURE — 51702 INSERT TEMP BLADDER CATH: CPT

## 2022-01-12 PROCEDURE — 2580000003 HC RX 258: Performed by: PHYSICIAN ASSISTANT

## 2022-01-12 PROCEDURE — 82947 ASSAY GLUCOSE BLOOD QUANT: CPT

## 2022-01-12 PROCEDURE — 99284 EMERGENCY DEPT VISIT MOD MDM: CPT

## 2022-01-12 PROCEDURE — 85025 COMPLETE CBC W/AUTO DIFF WBC: CPT

## 2022-01-12 PROCEDURE — 96360 HYDRATION IV INFUSION INIT: CPT

## 2022-01-12 PROCEDURE — 80048 BASIC METABOLIC PNL TOTAL CA: CPT

## 2022-01-12 RX ORDER — 0.9 % SODIUM CHLORIDE 0.9 %
1000 INTRAVENOUS SOLUTION INTRAVENOUS ONCE
Status: COMPLETED | OUTPATIENT
Start: 2022-01-12 | End: 2022-01-13

## 2022-01-12 RX ORDER — LIDOCAINE HYDROCHLORIDE 20 MG/ML
20 JELLY TOPICAL PRN
Status: DISCONTINUED | OUTPATIENT
Start: 2022-01-12 | End: 2022-01-13 | Stop reason: HOSPADM

## 2022-01-12 RX ADMIN — SODIUM CHLORIDE 1000 ML: 9 INJECTION, SOLUTION INTRAVENOUS at 23:46

## 2022-01-12 ASSESSMENT — PAIN DESCRIPTION - FREQUENCY: FREQUENCY: CONTINUOUS

## 2022-01-12 ASSESSMENT — ENCOUNTER SYMPTOMS
FACIAL SWELLING: 0
NAUSEA: 0
VOMITING: 0
TROUBLE SWALLOWING: 0

## 2022-01-12 ASSESSMENT — PAIN DESCRIPTION - PAIN TYPE: TYPE: ACUTE PAIN

## 2022-01-12 ASSESSMENT — PAIN SCALES - GENERAL: PAINLEVEL_OUTOF10: 7

## 2022-01-12 ASSESSMENT — PAIN DESCRIPTION - LOCATION: LOCATION: BUTTOCKS

## 2022-01-12 NOTE — ED NOTES
Bed: 20  Expected date:   Expected time:   Means of arrival:   Comments:  [address], RN  01/12/22 1153

## 2022-01-13 VITALS
BODY MASS INDEX: 33.6 KG/M2 | HEIGHT: 71 IN | OXYGEN SATURATION: 100 % | DIASTOLIC BLOOD PRESSURE: 77 MMHG | WEIGHT: 240 LBS | TEMPERATURE: 97.7 F | SYSTOLIC BLOOD PRESSURE: 106 MMHG | HEART RATE: 61 BPM | RESPIRATION RATE: 23 BRPM

## 2022-01-13 LAB
ANION GAP SERPL CALCULATED.3IONS-SCNC: 16 MMOL/L (ref 9–17)
BUN BLDV-MCNC: 27 MG/DL (ref 6–20)
BUN/CREAT BLD: 7 (ref 9–20)
CALCIUM SERPL-MCNC: 8.8 MG/DL (ref 8.6–10.4)
CHLORIDE BLD-SCNC: 90 MMOL/L (ref 98–107)
CO2: 29 MMOL/L (ref 20–31)
CREAT SERPL-MCNC: 3.69 MG/DL (ref 0.7–1.2)
GFR AFRICAN AMERICAN: 21 ML/MIN
GFR NON-AFRICAN AMERICAN: 17 ML/MIN
GFR SERPL CREATININE-BSD FRML MDRD: ABNORMAL ML/MIN/{1.73_M2}
GFR SERPL CREATININE-BSD FRML MDRD: ABNORMAL ML/MIN/{1.73_M2}
GLUCOSE BLD-MCNC: 114 MG/DL (ref 70–99)
POTASSIUM SERPL-SCNC: 3.6 MMOL/L (ref 3.7–5.3)
SODIUM BLD-SCNC: 135 MMOL/L (ref 135–144)

## 2022-01-13 NOTE — ED PROVIDER NOTES
[facility] ED  eMERGENCY dEPARTMENT eNCOUnter      Pt Name: Evangelist Ludwig  MRN: [id number]  [place] 1965  Date of evaluation: 1/12/22      CHIEF COMPLAINT       Chief Complaint   Patient presents with    Hearing Problem    Urinary Catheter Problem         HISTORY OF PRESENT ILLNESS    Evangelist Ludwig is a 61 y.o. male who presents complaining of decreased hearing and urinary catheter problem. The history is provided by the patient. Dysuria   This is a new problem. The current episode started yesterday. The problem occurs intermittently. The problem has not changed since onset. There has been no fever. He is not sexually active. There is no history of pyelonephritis. Pertinent negatives include no chills, no sweats, no nausea, no vomiting, no discharge, no frequency, no hematuria, no hesitancy, no possible pregnancy, no urgency and no flank pain. Associated symptoms comments: He presents today because he has had decreased hearing and decreased urine output from his Clemons catheter. He was discharged from the extended care facility today. He has a Clemons catheter in for urinary retention. He has not had any fever he has not had any chills. He has not been taking fluids by mouth. He states that he had can of soda and a glass of tea today. Clayborne Nestoruet He has tried nothing for the symptoms. His past medical history is significant for catheterization. REVIEW OF SYSTEMS       Review of Systems   Constitutional: Negative for chills. HENT: Negative for ear discharge, ear pain, facial swelling, tinnitus and trouble swallowing. Gastrointestinal: Negative for nausea and vomiting. Genitourinary: Positive for dysuria. Negative for flank pain, frequency, hematuria, hesitancy and urgency. All other systems reviewed and are negative.       PAST MEDICAL HISTORY     Past Medical History:   Diagnosis Date    Anxiety and depression     Back pain, chronic     BPH (benign prostatic hyperplasia)     Cellulitis of left lower extremity 8/21/2017    Cellulitis of right lower extremity 8/19/2017    CHF (congestive heart failure) (HCC)     Cirrhosis ([address]     Diabetes mellitus ([address]     not checking bloodsugar at home    Epididymoorchitis     GERD (gastroesophageal reflux disease)     H/O cardiac catheterization not sure of date    no stents    Hepatitis     Pt does not know the type.  Hiatal hernia     History of alcohol abuse     Sober since 2013    Hyperlipidemia     not taking any medication    Hypertension     IBS (irritable bowel syndrome)     Incisional hernia with obstruction but no gangrene 5/20/2018    Klebsiella sepsis ([address] 31/19/3845    Metabolic encephalopathy     Morbid obesity ([address]     Neuropathy     feet,toes    On home oxygen therapy     DME for home oxgygen at 2.5 lpm at HS and as needed    Pancreatitis     x 5 episodes    Poisoning by insulin and oral hypoglycemic (antidiabetic) drugs, accidental (unintentional), initial encounter 1/12/2021    Primary osteoarthritis of right knee     Retention, urine 1/24/2018    SBO (small bowel obstruction) ([address] 5/19/2018    Septic shock ([address]     Sleep apnea     suspected juany, never has had PSG study.   HAS NO MACHINE    Thyroid disease     Urinary bladder neurogenic dysfunction     Urinary tract infection associated with indwelling urethral catheter ([address] 11/4/2020       SURGICAL HISTORY       Past Surgical History:   Procedure Laterality Date    ABDOMEN SURGERY      hernia repair x4 (ventral)    COLONOSCOPY      2012    CYSTOSCOPY  01/24/2018    CYSTOSCOPY  02/20/2019    CYSTOSCOPY N/A 2/20/2019    CYSTOSCOPY DILATION performed by Korin Barr MD at [address] N/A 8/23/2019    CYSTOSCOPY/ DILATION NICOLE CATHETER EXCHANGE performed by Korin Barr MD at [address] [facility], [address]  05/20/2018    repair and reduction of recurrent incarcerated incisional hernia with mesh    OR CYSTOURETHROSCOPY N/A 1/24/2018    CYSTOSCOPY Bernard Cummins performed by Paul Rubio MD at [address] Bilateral 8/22/2017    FOOT DEBRIDEMENT INCISION AND DRAINAGE with bone bx performed by Samaria Moreau DPM at [address] EGD TRANSORAL BIOPSY SINGLE/MULTIPLE N/A 7/19/2017    EGD BIOPSY performed by Jovany Rosen MD at [facility]  07/19/2017    polypectomy    UPPER GASTROINTESTINAL ENDOSCOPY N/A 3/14/2019    EGD BIOPSY performed by Isacc Day MD at [facility] N/A 5/20/2018    REPAIR AND REDUCTION OF RECURRENT INCARCERATED INCISIONAL HERNIA WITH MESH performed by Gordy Rosales MD at [facility]       Previous Medications    ALBUTEROL SULFATE HFA (PROAIR HFA) 108 (90 BASE) MCG/ACT INHALER    Inhale 2 puffs into the lungs every 6 hours as needed for Wheezing    ASPIRIN 81 MG EC TABLET    Take 1 tablet by mouth daily    CLONAZEPAM (KLONOPIN) 0.5 MG TABLET    Take 1 tablet by mouth 2 times daily for 3 days. CLONAZEPAM (KLONOPIN) 0.5 MG TABLET    Take 1 tablet by mouth 2 times daily for 3 days. DIPHENOXYLATE-ATROPINE (LOMOTIL) 2.5-0.025 MG PER TABLET    Take 1 tablet by mouth 4 times daily as needed for Diarrhea.     DULOXETINE (CYMBALTA) 60 MG CAPSULE    Take 60 mg by mouth every morning     ESOMEPRAZOLE (NEXIUM) 40 MG CAPSULE    Take 40 mg by mouth 2 times daily     FLUOCINONIDE (LIDEX) 0.05 % EXTERNAL SOLUTION    Apply topically as needed (scalp itching)    FUROSEMIDE (LASIX) 40 MG TABLET    Take 1 tablet by mouth daily    KETOCONAZOLE (NIZORAL) 2 % SHAMPOO    Apply topically Twice a Week    LEVOTHYROXINE (SYNTHROID) 175 MCG TABLET    Take 175 mcg by mouth Daily     LOSARTAN (COZAAR) 50 MG TABLET    Take 1 tablet by mouth daily    NYSTATIN (MYCOSTATIN) 650425 UNIT/GM CREAM    Apply topically 2 times daily as needed (to skin folds)    NYSTATIN (MYCOSTATIN) 400587 UNIT/GM POWDER    Apply topically 2 times daily as needed TO ABDOMINAL FOLDS, GROIN     OXYBUTYNIN (DITROPAN) 5 MG TABLET    Take 1 tablet by mouth 2 times daily    PREGABALIN (LYRICA) 150 MG CAPSULE    Take 150 mg by mouth 2 times daily. QUETIAPINE (SEROQUEL XR) 50 MG EXTENDED RELEASE TABLET    Take 50 mg by mouth nightly    SITAGLIPTIN (JANUVIA) 100 MG TABLET    Take 100 mg by mouth daily    TAMSULOSIN (FLOMAX) 0.4 MG CAPSULE    Take 1 capsule by mouth daily    TRIAMCINOLONE (KENALOG) 0.025 % CREAM    Apply topically 2 times daily as needed       ALLERGIES     is allergic to no known allergies. FAMILY HISTORY     is adopted. SOCIAL HISTORY      reports that he has never smoked. He has never used smokeless tobacco. He reports that he does not drink alcohol and does not use drugs. PHYSICAL EXAM     INITIAL VITALS: BP (!) 85/50   Pulse 66   Temp 97.7 °F (36.5 °C) (Oral)   Resp 18   Ht 5' 11\" (1.803 m)   Wt 240 lb (108.9 kg)   SpO2 100%   BMI 33.47 kg/m²      Physical Exam  Vitals and nursing note reviewed. Constitutional:       Appearance: He is well-developed. HENT:      Head: Normocephalic and atraumatic. Right Ear: A middle ear effusion is present. Left Ear: A middle ear effusion is present. Ears:      Comments: Has bilateral middle ear effusion no signs of infection no redness no hemotympanums. No cerumen impaction. Eyes:      Pupils: Pupils are equal, round, and reactive to light. Cardiovascular:      Rate and Rhythm: Normal rate and regular rhythm. Heart sounds: Normal heart sounds. No murmur heard. Pulmonary:      Effort: Pulmonary effort is normal.      Breath sounds: Normal breath sounds. Abdominal:      General: Bowel sounds are normal.      Palpations: Abdomen is soft. Tenderness: There is no abdominal tenderness. Genitourinary:     Comments: There is a Clemons catheter in place and drains a small amount of cloudy urine.   He does not have any suprapubic tenderness. Musculoskeletal:         General: Normal range of motion. Cervical back: Normal range of motion. Skin:     General: Skin is warm and dry. Neurological:      Mental Status: He is alert and oriented to person, place, and time. MEDICAL DECISION MAKING:     Patient presents for decreased hearing and decreased urine output from Clemons catheter. The Clemons catheter was changed it was irrigated it irrigates without difficulty he has no suprapubic tenderness he does not have any residual urine in the bladder. May take over-the-counter decongestant if tolerated. Follow-up with ear nose and throat or audiology in 1 to 2 days for recheck. Increase fluids avoid soda pop drink more water  I offer the patient fluids he states that he has been trying to drink fluids but is having decreased urine output. We will start IV give him a liter of fluid check basic labs and urine. We will observe the patient he will be here waiting for a ride for quite some time. His blood pressure was been running low as 77/41 he reports that this is a normal blood pressure for him as of lately. I did give report to Dr. Charlene Lauren who will assume care and disposition the patient as it is in my shift. DIAGNOSTIC RESULTS     EKG: All EKG's are interpreted by the Emergency Department Physician who either signs or Co-signs this chart in the absence of acardiologist.        RADIOLOGY:Allplain film, CT, MRI, and formal ultrasound images (except ED bedside ultrasound) are read by the radiologist and the images and interpretations are directly viewed by the emergency physician. LABS:All lab results were reviewed by myself, and all abnormals are listed below.   Labs Reviewed   CBC WITH AUTO DIFFERENTIAL - Abnormal; Notable for the following components:       Result Value    RBC 4.04 (*)     Hemoglobin 10.6 (*)     Hematocrit 35.0 (*)     RDW 16.0 (*)     Platelets 968 (*)     Seg Neutrophils 78 (*) Lymphocytes 13 (*)     All other components within normal limits   BASIC METABOLIC PANEL W/ REFLEX TO MG FOR LOW K - Abnormal; Notable for the following components:    Glucose 114 (*)     BUN 27 (*)     CREATININE 3.69 (*)     Bun/Cre Ratio 7 (*)     Potassium 3.6 (*)     Chloride 90 (*)     GFR Non- 17 (*)     GFR  21 (*)     All other components within normal limits   POC GLUCOSE FINGERSTICK - Abnormal; Notable for the following components:    POC Glucose 160 (*)     All other components within normal limits   URINE RT REFLEX TO CULTURE         EMERGENCY DEPARTMENT COURSE:   Vitals:    Vitals:    01/12/22 1736 01/13/22 0054   BP: (!) 77/41 (!) 85/50   Pulse: 66    Resp: 18    Temp: 97.7 °F (36.5 °C)    TempSrc: Oral    SpO2: 100%    Weight: 240 lb (108.9 kg)    Height: 5' 11\" (1.803 m)        The patient was given the following medications while in the emergency department:  Orders Placed This Encounter   Medications    lidocaine (XYLOCAINE) 2 % uro-jet 20 mL    0.9 % sodium chloride bolus       -------------------------      CRITICAL CARE:    CONSULTS:  None    PROCEDURES:  Procedures    FINAL IMPRESSION      1.  Problem with Clemons catheter, initial encounter [facility])          DISPOSITION/PLAN   DISPOSITION Decision To Discharge 01/12/2022 10:03:48 PM      PATIENT REFERREDTO:  [facility] ED  [address] [facility]  [phone]    If symptoms worsen    Adriana Gutiérrez MD  [address] [facility]  [phone]    Schedule an appointment as soon as possible for a visit in 2 days        DISCHARGEMEDICATIONS:  New Prescriptions    No medications on file       (Please note that portions of this note were completed with a voice recognition program.  Efforts were made to edit thedictations but occasionally words are mis-transcribed.)    BRADFORD Hidalgo PA-C  01/13/22 6022

## 2022-01-13 NOTE — ED NOTES
Report given to transport team and pt assisted onto stretcher.        Michael Lassiter RN  01/13/22 8221

## 2022-01-13 NOTE — ED NOTES
New urinary cath placed; scant amount of return. Cath flushed w/100 mL sterile water; no resistance felt and pt tolerated well; 100 mL fluid returned to bag. Procedure repeated w/same result. PA notified.       Dennise Streeter RN  01/12/22 8660

## 2022-01-13 NOTE — ED NOTES
Pt assisted with repositioning. Repeat vitals obtained.  Pt resting on stretcher at this time awaiting transport home, pt denies needs     Zaira Gunter RN  01/13/22 0957

## 2022-01-14 ENCOUNTER — CARE COORDINATION (OUTPATIENT)
Dept: CASE MANAGEMENT | Age: 57
End: 2022-01-14

## 2022-01-14 NOTE — CARE COORDINATION
Janee 45 Transitions Initial Follow Up Call    Call within 2 business days of discharge: Yes    Patient: Benedicto Jean Patient : 1965   MRN: [id number]  Reason for Admission:   Discharge Date: 22 RARS: Readmission Risk Score: 28      Last Discharge [address]       Complaint Diagnosis Description Type Department Provider    22 Hearing Problem; Urinary Catheter Problem Problem with Wells catheter, initial encounter [facility]) ED (DISCHARGE) MONY Harmon MD           Spoke with: [address]: DUSTIN    Non-face-to-face services provided:  Scheduled appointment with Specialist-patient stated has an appointmnet with ENT on   Obtained and reviewed discharge summary and/or continuity of care documents  Communication with home health agencies or other community services the patient is currently using-writer spoke to Glenys Fernandez that was present at time of call, stated VN would be there later today  Assessment and support for treatment adherence and medication management-reivewed discharge instructions and medications, reviewed covid precautions and also explained BPCI-A Medicare Benefit, patient v/u, stated he is feeling ok still has the buzzing in his ears/head, wells is draining better, he already made an appointment with an ENT for , has f/u with nephro on 22, will call and scheudle his own HFU, is going to wait until VN comes this afternoon, explained role of CTN, provided contact information, will follow//JU   Transitions of Care Initial Call    Was this an external facility discharge?  No Discharge Facility: [address] ED    Challenges to be reviewed by the provider   Additional needs identified to be addressed with provider: No  none             Method of communication with provider : none      Advance Care Planning:   Does patient have an Advance Directive: reviewed and current, reviewed and needs to be updated, not on file; education provided, not on file, patient declined education, decision maker updated and referral to internal ACP facilitator. Was this a readmission? No  Patient stated reason for admission:   Patients top risk factors for readmission: functional physical ability    Care Transition Nurse (CTN) contacted the patient by telephone to perform post hospital discharge assessment. Verified name and  with patient as identifiers. Provided introduction to self, and explanation of the CTN role. CTN reviewed discharge instructions, medical action plan and red flags with patient who verbalized understanding. Patient given an opportunity to ask questions and does not have any further questions or concerns at this time. Were discharge instructions available to patient? Yes. Reviewed appropriate site of care based on symptoms and resources available to patient including: PCP, Specialist, Urgent care clinics, Home health and When to call 911. The patient agrees to contact the PCP office for questions related to their healthcare. Medication reconciliation was performed with patient, who verbalizes understanding of administration of home medications. Advised obtaining a 90-day supply of all daily and as-needed medications. Covid Risk Education     Educated patient about risk for severe COVID-19 due to risk factors according to CDC guidelines. CTN reviewed discharge instructions, medical action plan and red flag symptoms with the patient who verbalized understanding. Discussed COVID vaccination status: Yes. Education provided on COVID-19 vaccination as appropriate. Discussed exposure protocols and quarantine with CDC Guidelines. Patient was given an opportunity to verbalize any questions and concerns and agrees to contact CTN or health care provider for questions related to their healthcare. Reviewed and educated patient on any new and changed medications related to discharge diagnosis. CTN provided contact information.  Plan for follow-up call in 5-7 days based on severity of symptoms and risk factors.   Plan for next call: routine        Care Transitions 24 Hour Call    Schedule Follow Up Appointment with PCP: Yesica Melo you have any ongoing symptoms?: Yes  Patient-reported symptoms: Fatigue  Do you have a copy of your discharge instructions?: Yes  Do you have all of your prescriptions and are they filled?: Yes  Have you been contacted by a [facility] Pharmacist?: No  Have you scheduled your follow up appointment?: Yes (Comment: has appointment with ENT on 1/31/22)  Were you discharged with any Home Care or Post Acute Services: Yes  Post Acute Services: Home Health (Comment: [facility])  Do you feel like you have everything you need to keep you well at home?: Yes  Care Transitions Interventions         Follow Up  Future Appointments   Date Time Provider Bennett Murdock   2/7/2022 10:40 AM Jamie Bush MD [facility] None       Bulmaro Sewell RN

## 2022-01-15 ENCOUNTER — HOSPITAL ENCOUNTER (INPATIENT)
Age: 57
LOS: 6 days | Discharge: HOME | DRG: 698 | End: 2022-01-21
Attending: EMERGENCY MEDICINE | Admitting: INTERNAL MEDICINE
Payer: MEDICARE

## 2022-01-15 ENCOUNTER — APPOINTMENT (OUTPATIENT)
Dept: CT IMAGING | Age: 57
DRG: 698 | End: 2022-01-15
Payer: MEDICARE

## 2022-01-15 ENCOUNTER — APPOINTMENT (OUTPATIENT)
Dept: NUCLEAR MEDICINE | Age: 57
DRG: 698 | End: 2022-01-15
Payer: MEDICARE

## 2022-01-15 ENCOUNTER — APPOINTMENT (OUTPATIENT)
Dept: GENERAL RADIOLOGY | Age: 57
DRG: 698 | End: 2022-01-15
Payer: MEDICARE

## 2022-01-15 DIAGNOSIS — N17.9 AKI (ACUTE KIDNEY INJURY) (HCC): ICD-10-CM

## 2022-01-15 DIAGNOSIS — G93.40 ACUTE ENCEPHALOPATHY: Primary | ICD-10-CM

## 2022-01-15 DIAGNOSIS — T83.511A URINARY TRACT INFECTION ASSOCIATED WITH CATHETERIZATION OF URINARY TRACT, UNSPECIFIED INDWELLING URINARY CATHETER TYPE, INITIAL ENCOUNTER (HCC): ICD-10-CM

## 2022-01-15 DIAGNOSIS — N39.0 URINARY TRACT INFECTION ASSOCIATED WITH CATHETERIZATION OF URINARY TRACT, UNSPECIFIED INDWELLING URINARY CATHETER TYPE, INITIAL ENCOUNTER (HCC): ICD-10-CM

## 2022-01-15 PROBLEM — R40.0 SOMNOLENCE: Status: ACTIVE | Noted: 2022-01-15

## 2022-01-15 LAB
-: ABNORMAL
ABSOLUTE EOS #: 0.1 K/UL (ref 0–0.44)
ABSOLUTE IMMATURE GRANULOCYTE: 0.14 K/UL (ref 0–0.3)
ABSOLUTE LYMPH #: 0.97 K/UL (ref 1.1–3.7)
ABSOLUTE MONO #: 0.61 K/UL (ref 0.1–1.2)
ALBUMIN SERPL-MCNC: 3.4 G/DL (ref 3.5–5.2)
ALBUMIN/GLOBULIN RATIO: ABNORMAL (ref 1–2.5)
ALLEN TEST: ABNORMAL
ALP BLD-CCNC: 163 U/L (ref 40–129)
ALT SERPL-CCNC: 12 U/L (ref 5–41)
AMMONIA: <10 UMOL/L (ref 16–60)
AMORPHOUS: ABNORMAL
ANION GAP SERPL CALCULATED.3IONS-SCNC: 20 MMOL/L (ref 9–17)
AST SERPL-CCNC: 14 U/L
BACTERIA: ABNORMAL
BASOPHILS # BLD: 1 % (ref 0–2)
BASOPHILS ABSOLUTE: 0.05 K/UL (ref 0–0.2)
BILIRUB SERPL-MCNC: 0.42 MG/DL (ref 0.3–1.2)
BILIRUBIN DIRECT: 0.17 MG/DL
BILIRUBIN URINE: ABNORMAL
BILIRUBIN, INDIRECT: 0.25 MG/DL (ref 0–1)
BNP INTERPRETATION: ABNORMAL
BUN BLDV-MCNC: 46 MG/DL (ref 6–20)
BUN/CREAT BLD: 7 (ref 9–20)
C-REACTIVE PROTEIN: 98.8 MG/L (ref 0–5)
CALCIUM SERPL-MCNC: 8.9 MG/DL (ref 8.6–10.4)
CASTS UA: ABNORMAL /LPF
CHLORIDE BLD-SCNC: 90 MMOL/L (ref 98–107)
CHLORIDE, UR: <20 MMOL/L
CHP ED QC CHECK: NORMAL
CO2: 23 MMOL/L (ref 20–31)
COLOR: ABNORMAL
COMMENT UA: ABNORMAL
CREAT SERPL-MCNC: 6.21 MG/DL (ref 0.7–1.2)
CRYSTALS, UA: ABNORMAL /HPF
D-DIMER QUANTITATIVE: 1.01 MG/L FEU (ref 0–0.59)
DIFFERENTIAL TYPE: ABNORMAL
EOSINOPHILS RELATIVE PERCENT: 1 % (ref 1–4)
EPITHELIAL CELLS UA: ABNORMAL /HPF (ref 0–5)
FERRITIN: 160 UG/L (ref 30–400)
FIO2: 30
GFR AFRICAN AMERICAN: 11 ML/MIN
GFR NON-AFRICAN AMERICAN: 9 ML/MIN
GFR SERPL CREATININE-BSD FRML MDRD: ABNORMAL ML/MIN/{1.73_M2}
GFR SERPL CREATININE-BSD FRML MDRD: ABNORMAL ML/MIN/{1.73_M2}
GLOBULIN: ABNORMAL G/DL (ref 1.5–3.8)
GLUCOSE BLD-MCNC: 116 MG/DL (ref 70–99)
GLUCOSE BLD-MCNC: 63 MG/DL (ref 75–110)
GLUCOSE BLD-MCNC: 67 MG/DL (ref 75–110)
GLUCOSE BLD-MCNC: 74 MG/DL
GLUCOSE BLD-MCNC: 74 MG/DL (ref 75–110)
GLUCOSE BLD-MCNC: 76 MG/DL (ref 75–110)
GLUCOSE BLD-MCNC: 78 MG/DL (ref 75–110)
GLUCOSE BLD-MCNC: 93 MG/DL (ref 75–110)
GLUCOSE URINE: NEGATIVE
HCT VFR BLD CALC: 32.4 % (ref 40.7–50.3)
HEMOGLOBIN: 10.1 G/DL (ref 13–17)
IMMATURE GRANULOCYTES: 1 %
KETONES, URINE: ABNORMAL
LACTATE DEHYDROGENASE: 125 U/L (ref 135–225)
LACTIC ACID, SEPSIS WHOLE BLOOD: ABNORMAL MMOL/L (ref 0.5–1.9)
LACTIC ACID, SEPSIS WHOLE BLOOD: ABNORMAL MMOL/L (ref 0.5–1.9)
LACTIC ACID, SEPSIS: 2 MMOL/L (ref 0.5–1.9)
LACTIC ACID, SEPSIS: 3 MMOL/L (ref 0.5–1.9)
LEUKOCYTE ESTERASE, URINE: ABNORMAL
LYMPHOCYTES # BLD: 9 % (ref 24–43)
MAGNESIUM: 1.2 MG/DL (ref 1.6–2.6)
MCH RBC QN AUTO: 26.9 PG (ref 25.2–33.5)
MCHC RBC AUTO-ENTMCNC: 31.2 G/DL (ref 28.4–34.8)
MCV RBC AUTO: 86.2 FL (ref 82.6–102.9)
MODE: ABNORMAL
MONOCYTES # BLD: 6 % (ref 3–12)
MUCUS: ABNORMAL
NEGATIVE BASE EXCESS, ART: ABNORMAL (ref 0–2)
NITRITE, URINE: POSITIVE
NRBC AUTOMATED: 0 PER 100 WBC
O2 DEVICE/FLOW/%: ABNORMAL
OSMOLALITY URINE: 302 MOSM/KG (ref 300–1170)
OTHER OBSERVATIONS UA: ABNORMAL
PATIENT TEMP: 37
PDW BLD-RTO: 16.2 % (ref 11.8–14.4)
PH UA: 5 (ref 5–8)
PLATELET # BLD: 141 K/UL (ref 138–453)
PLATELET ESTIMATE: ABNORMAL
PMV BLD AUTO: 10.9 FL (ref 8.1–13.5)
POC HCO3: 26.9 MMOL/L (ref 21–28)
POC O2 SATURATION: 90 % (ref 94–98)
POC PCO2 TEMP: ABNORMAL MM HG
POC PCO2: 51.3 MM HG (ref 35–48)
POC PH TEMP: ABNORMAL
POC PH: 7.33 (ref 7.35–7.45)
POC PO2 TEMP: ABNORMAL MM HG
POC PO2: 64.2 MM HG (ref 83–108)
POC TCO2: 28 MMOL/L (ref 22–30)
POSITIVE BASE EXCESS, ART: 0 (ref 0–3)
POTASSIUM SERPL-SCNC: 4.4 MMOL/L (ref 3.7–5.3)
PRO-BNP: 1574 PG/ML
PROCALCITONIN: 0.42 NG/ML
PROTEIN UA: ABNORMAL
RBC # BLD: 3.76 M/UL (ref 4.21–5.77)
RBC # BLD: ABNORMAL 10*6/UL
RBC UA: ABNORMAL /HPF (ref 0–2)
RENAL EPITHELIAL, UA: ABNORMAL /HPF
SAMPLE SITE: ABNORMAL
SARS-COV-2, RAPID: NOT DETECTED
SEG NEUTROPHILS: 82 % (ref 36–65)
SEGMENTED NEUTROPHILS ABSOLUTE COUNT: 9.24 K/UL (ref 1.5–8.1)
SODIUM BLD-SCNC: 133 MMOL/L (ref 135–144)
SODIUM,UR: <20 MMOL/L
SPECIFIC GRAVITY UA: 1.04 (ref 1–1.03)
SPECIMEN DESCRIPTION: NORMAL
TCO2 (CALC), ART: ABNORMAL MMOL/L (ref 22–29)
TOTAL PROTEIN, URINE: 227 MG/DL
TOTAL PROTEIN: 7.8 G/DL (ref 6.4–8.3)
TRICHOMONAS: ABNORMAL
TROPONIN INTERP: ABNORMAL
TROPONIN T: ABNORMAL NG/ML
TROPONIN, HIGH SENSITIVITY: 53 NG/L (ref 0–22)
TURBIDITY: ABNORMAL
URIC ACID, UR: 13.3 MG/DL
URINE HGB: ABNORMAL
UROBILINOGEN, URINE: NORMAL
WBC # BLD: 11.1 K/UL (ref 3.5–11.3)
WBC # BLD: ABNORMAL 10*3/UL
WBC UA: ABNORMAL /HPF (ref 0–5)
YEAST: ABNORMAL

## 2022-01-15 PROCEDURE — 84443 ASSAY THYROID STIM HORMONE: CPT

## 2022-01-15 PROCEDURE — 51798 US URINE CAPACITY MEASURE: CPT

## 2022-01-15 PROCEDURE — 81001 URINALYSIS AUTO W/SCOPE: CPT

## 2022-01-15 PROCEDURE — 82728 ASSAY OF FERRITIN: CPT

## 2022-01-15 PROCEDURE — 87635 SARS-COV-2 COVID-19 AMP PRB: CPT

## 2022-01-15 PROCEDURE — XW033N5 INTRODUCTION OF MEROPENEM-VABORBACTAM ANTI-INFECTIVE INTO PERIPHERAL VEIN, PERCUTANEOUS APPROACH, NEW TECHNOLOGY GROUP 5: ICD-10-PCS | Performed by: INTERNAL MEDICINE

## 2022-01-15 PROCEDURE — 99284 EMERGENCY DEPT VISIT MOD MDM: CPT

## 2022-01-15 PROCEDURE — 80048 BASIC METABOLIC PNL TOTAL CA: CPT

## 2022-01-15 PROCEDURE — 86140 C-REACTIVE PROTEIN: CPT

## 2022-01-15 PROCEDURE — 93005 ELECTROCARDIOGRAM TRACING: CPT | Performed by: EMERGENCY MEDICINE

## 2022-01-15 PROCEDURE — 2000000000 HC ICU R&B

## 2022-01-15 PROCEDURE — 74176 CT ABD & PELVIS W/O CONTRAST: CPT

## 2022-01-15 PROCEDURE — 2580000003 HC RX 258: Performed by: NURSE PRACTITIONER

## 2022-01-15 PROCEDURE — 87086 URINE CULTURE/COLONY COUNT: CPT

## 2022-01-15 PROCEDURE — 84560 ASSAY OF URINE/URIC ACID: CPT

## 2022-01-15 PROCEDURE — 99223 1ST HOSP IP/OBS HIGH 75: CPT | Performed by: NURSE PRACTITIONER

## 2022-01-15 PROCEDURE — 96375 TX/PRO/DX INJ NEW DRUG ADDON: CPT

## 2022-01-15 PROCEDURE — 82140 ASSAY OF AMMONIA: CPT

## 2022-01-15 PROCEDURE — 82803 BLOOD GASES ANY COMBINATION: CPT

## 2022-01-15 PROCEDURE — 78580 LUNG PERFUSION IMAGING: CPT

## 2022-01-15 PROCEDURE — 2580000003 HC RX 258: Performed by: EMERGENCY MEDICINE

## 2022-01-15 PROCEDURE — 85379 FIBRIN DEGRADATION QUANT: CPT

## 2022-01-15 PROCEDURE — 36600 WITHDRAWAL OF ARTERIAL BLOOD: CPT

## 2022-01-15 PROCEDURE — 84145 PROCALCITONIN (PCT): CPT

## 2022-01-15 PROCEDURE — 87040 BLOOD CULTURE FOR BACTERIA: CPT

## 2022-01-15 PROCEDURE — 2500000003 HC RX 250 WO HCPCS: Performed by: NURSE PRACTITIONER

## 2022-01-15 PROCEDURE — 84484 ASSAY OF TROPONIN QUANT: CPT

## 2022-01-15 PROCEDURE — 83880 ASSAY OF NATRIURETIC PEPTIDE: CPT

## 2022-01-15 PROCEDURE — 80069 RENAL FUNCTION PANEL: CPT

## 2022-01-15 PROCEDURE — 80076 HEPATIC FUNCTION PANEL: CPT

## 2022-01-15 PROCEDURE — 02HV33Z INSERTION OF INFUSION DEVICE INTO SUPERIOR VENA CAVA, PERCUTANEOUS APPROACH: ICD-10-PCS | Performed by: STUDENT IN AN ORGANIZED HEALTH CARE EDUCATION/TRAINING PROGRAM

## 2022-01-15 PROCEDURE — 80053 COMPREHEN METABOLIC PANEL: CPT

## 2022-01-15 PROCEDURE — 82947 ASSAY GLUCOSE BLOOD QUANT: CPT

## 2022-01-15 PROCEDURE — 6360000002 HC RX W HCPCS: Performed by: NURSE PRACTITIONER

## 2022-01-15 PROCEDURE — 6360000002 HC RX W HCPCS: Performed by: EMERGENCY MEDICINE

## 2022-01-15 PROCEDURE — 83615 LACTATE (LD) (LDH) ENZYME: CPT

## 2022-01-15 PROCEDURE — 6370000000 HC RX 637 (ALT 250 FOR IP): Performed by: NURSE PRACTITIONER

## 2022-01-15 PROCEDURE — 87186 SC STD MICRODIL/AGAR DIL: CPT

## 2022-01-15 PROCEDURE — A9540 TC99M MAA: HCPCS | Performed by: EMERGENCY MEDICINE

## 2022-01-15 PROCEDURE — 84156 ASSAY OF PROTEIN URINE: CPT

## 2022-01-15 PROCEDURE — 83605 ASSAY OF LACTIC ACID: CPT

## 2022-01-15 PROCEDURE — 82374 ASSAY BLOOD CARBON DIOXIDE: CPT

## 2022-01-15 PROCEDURE — 84166 PROTEIN E-PHORESIS/URINE/CSF: CPT

## 2022-01-15 PROCEDURE — 84100 ASSAY OF PHOSPHORUS: CPT

## 2022-01-15 PROCEDURE — 96361 HYDRATE IV INFUSION ADD-ON: CPT

## 2022-01-15 PROCEDURE — 85025 COMPLETE CBC W/AUTO DIFF WBC: CPT

## 2022-01-15 PROCEDURE — 3430000000 HC RX DIAGNOSTIC RADIOPHARMACEUTICAL: Performed by: EMERGENCY MEDICINE

## 2022-01-15 PROCEDURE — 82436 ASSAY OF URINE CHLORIDE: CPT

## 2022-01-15 PROCEDURE — 96374 THER/PROPH/DIAG INJ IV PUSH: CPT

## 2022-01-15 PROCEDURE — 83036 HEMOGLOBIN GLYCOSYLATED A1C: CPT

## 2022-01-15 PROCEDURE — 84300 ASSAY OF URINE SODIUM: CPT

## 2022-01-15 PROCEDURE — 71045 X-RAY EXAM CHEST 1 VIEW: CPT

## 2022-01-15 PROCEDURE — 36415 COLL VENOUS BLD VENIPUNCTURE: CPT

## 2022-01-15 PROCEDURE — 87077 CULTURE AEROBIC IDENTIFY: CPT

## 2022-01-15 PROCEDURE — 86335 IMMUNFIX E-PHORSIS/URINE/CSF: CPT

## 2022-01-15 PROCEDURE — 83935 ASSAY OF URINE OSMOLALITY: CPT

## 2022-01-15 PROCEDURE — 70450 CT HEAD/BRAIN W/O DYE: CPT

## 2022-01-15 PROCEDURE — 82248 BILIRUBIN DIRECT: CPT

## 2022-01-15 PROCEDURE — 83735 ASSAY OF MAGNESIUM: CPT

## 2022-01-15 RX ORDER — SODIUM CHLORIDE 0.9 % (FLUSH) 0.9 %
5-40 SYRINGE (ML) INJECTION EVERY 12 HOURS SCHEDULED
Status: DISCONTINUED | OUTPATIENT
Start: 2022-01-15 | End: 2022-01-15 | Stop reason: SDUPTHER

## 2022-01-15 RX ORDER — NICOTINE POLACRILEX 4 MG
15 LOZENGE BUCCAL PRN
Status: DISCONTINUED | OUTPATIENT
Start: 2022-01-15 | End: 2022-01-21 | Stop reason: HOSPADM

## 2022-01-15 RX ORDER — MAGNESIUM SULFATE 1 G/100ML
1000 INJECTION INTRAVENOUS ONCE
Status: COMPLETED | OUTPATIENT
Start: 2022-01-15 | End: 2022-01-15

## 2022-01-15 RX ORDER — ACETAMINOPHEN 325 MG/1
650 TABLET ORAL EVERY 6 HOURS PRN
Status: DISCONTINUED | OUTPATIENT
Start: 2022-01-15 | End: 2022-01-21 | Stop reason: HOSPADM

## 2022-01-15 RX ORDER — SODIUM CHLORIDE 9 MG/ML
1000 INJECTION, SOLUTION INTRAVENOUS CONTINUOUS
Status: DISCONTINUED | OUTPATIENT
Start: 2022-01-15 | End: 2022-01-15

## 2022-01-15 RX ORDER — SODIUM CHLORIDE 0.9 % (FLUSH) 0.9 %
5-40 SYRINGE (ML) INJECTION PRN
Status: DISCONTINUED | OUTPATIENT
Start: 2022-01-15 | End: 2022-01-15 | Stop reason: SDUPTHER

## 2022-01-15 RX ORDER — DEXTROSE AND SODIUM CHLORIDE 5; .9 G/100ML; G/100ML
INJECTION, SOLUTION INTRAVENOUS CONTINUOUS
Status: DISCONTINUED | OUTPATIENT
Start: 2022-01-15 | End: 2022-01-17

## 2022-01-15 RX ORDER — PANTOPRAZOLE SODIUM 40 MG/1
40 TABLET, DELAYED RELEASE ORAL
Status: DISCONTINUED | OUTPATIENT
Start: 2022-01-16 | End: 2022-01-21 | Stop reason: HOSPADM

## 2022-01-15 RX ORDER — SODIUM POLYSTYRENE SULFONATE 15 G/60ML
15 SUSPENSION ORAL; RECTAL
Status: DISCONTINUED | OUTPATIENT
Start: 2022-01-15 | End: 2022-01-15

## 2022-01-15 RX ORDER — 0.9 % SODIUM CHLORIDE 0.9 %
1000 INTRAVENOUS SOLUTION INTRAVENOUS ONCE
Status: DISCONTINUED | OUTPATIENT
Start: 2022-01-15 | End: 2022-01-21 | Stop reason: HOSPADM

## 2022-01-15 RX ORDER — ALBUTEROL SULFATE 90 UG/1
2 AEROSOL, METERED RESPIRATORY (INHALATION) EVERY 6 HOURS PRN
Status: DISCONTINUED | OUTPATIENT
Start: 2022-01-15 | End: 2022-01-21 | Stop reason: HOSPADM

## 2022-01-15 RX ORDER — LEVOTHYROXINE SODIUM 175 UG/1
175 TABLET ORAL DAILY
Status: DISCONTINUED | OUTPATIENT
Start: 2022-01-16 | End: 2022-01-21 | Stop reason: HOSPADM

## 2022-01-15 RX ORDER — 0.9 % SODIUM CHLORIDE 0.9 %
1000 INTRAVENOUS SOLUTION INTRAVENOUS ONCE
Status: COMPLETED | OUTPATIENT
Start: 2022-01-15 | End: 2022-01-15

## 2022-01-15 RX ORDER — ONDANSETRON 4 MG/1
4 TABLET, ORALLY DISINTEGRATING ORAL EVERY 8 HOURS PRN
Status: DISCONTINUED | OUTPATIENT
Start: 2022-01-15 | End: 2022-01-21 | Stop reason: HOSPADM

## 2022-01-15 RX ORDER — SODIUM CHLORIDE 0.9 % (FLUSH) 0.9 %
5-40 SYRINGE (ML) INJECTION EVERY 12 HOURS SCHEDULED
Status: DISCONTINUED | OUTPATIENT
Start: 2022-01-15 | End: 2022-01-21 | Stop reason: HOSPADM

## 2022-01-15 RX ORDER — DEXTROSE MONOHYDRATE 50 MG/ML
100 INJECTION, SOLUTION INTRAVENOUS PRN
Status: DISCONTINUED | OUTPATIENT
Start: 2022-01-15 | End: 2022-01-21 | Stop reason: HOSPADM

## 2022-01-15 RX ORDER — DEXTROSE MONOHYDRATE 25 G/50ML
12.5 INJECTION, SOLUTION INTRAVENOUS PRN
Status: DISCONTINUED | OUTPATIENT
Start: 2022-01-15 | End: 2022-01-21 | Stop reason: HOSPADM

## 2022-01-15 RX ORDER — ONDANSETRON 2 MG/ML
4 INJECTION INTRAMUSCULAR; INTRAVENOUS EVERY 6 HOURS PRN
Status: DISCONTINUED | OUTPATIENT
Start: 2022-01-15 | End: 2022-01-21 | Stop reason: HOSPADM

## 2022-01-15 RX ORDER — ONDANSETRON 2 MG/ML
4 INJECTION INTRAMUSCULAR; INTRAVENOUS ONCE
Status: COMPLETED | OUTPATIENT
Start: 2022-01-15 | End: 2022-01-15

## 2022-01-15 RX ORDER — HEPARIN SODIUM 5000 [USP'U]/ML
5000 INJECTION, SOLUTION INTRAVENOUS; SUBCUTANEOUS EVERY 8 HOURS SCHEDULED
Status: DISCONTINUED | OUTPATIENT
Start: 2022-01-15 | End: 2022-01-21 | Stop reason: HOSPADM

## 2022-01-15 RX ORDER — SODIUM CHLORIDE 0.9 % (FLUSH) 0.9 %
5-40 SYRINGE (ML) INJECTION PRN
Status: DISCONTINUED | OUTPATIENT
Start: 2022-01-15 | End: 2022-01-21 | Stop reason: HOSPADM

## 2022-01-15 RX ORDER — ASPIRIN 81 MG/1
81 TABLET ORAL DAILY
Status: DISCONTINUED | OUTPATIENT
Start: 2022-01-15 | End: 2022-01-21 | Stop reason: HOSPADM

## 2022-01-15 RX ORDER — ACETAMINOPHEN 650 MG/1
650 SUPPOSITORY RECTAL EVERY 6 HOURS PRN
Status: DISCONTINUED | OUTPATIENT
Start: 2022-01-15 | End: 2022-01-21 | Stop reason: HOSPADM

## 2022-01-15 RX ORDER — SODIUM POLYSTYRENE SULFONATE 15 G/60ML
30 SUSPENSION ORAL; RECTAL
Status: DISCONTINUED | OUTPATIENT
Start: 2022-01-15 | End: 2022-01-15

## 2022-01-15 RX ORDER — OXYBUTYNIN CHLORIDE 5 MG/1
5 TABLET ORAL 2 TIMES DAILY
Status: DISCONTINUED | OUTPATIENT
Start: 2022-01-15 | End: 2022-01-21 | Stop reason: HOSPADM

## 2022-01-15 RX ORDER — 0.9 % SODIUM CHLORIDE 0.9 %
1000 INTRAVENOUS SOLUTION INTRAVENOUS ONCE
Status: DISCONTINUED | OUTPATIENT
Start: 2022-01-15 | End: 2022-01-15

## 2022-01-15 RX ORDER — 0.9 % SODIUM CHLORIDE 0.9 %
2899 INTRAVENOUS SOLUTION INTRAVENOUS ONCE
Status: COMPLETED | OUTPATIENT
Start: 2022-01-15 | End: 2022-01-15

## 2022-01-15 RX ORDER — NALOXONE HYDROCHLORIDE 1 MG/ML
1 INJECTION INTRAMUSCULAR; INTRAVENOUS; SUBCUTANEOUS ONCE
Status: COMPLETED | OUTPATIENT
Start: 2022-01-15 | End: 2022-01-15

## 2022-01-15 RX ORDER — SODIUM CHLORIDE 9 MG/ML
25 INJECTION, SOLUTION INTRAVENOUS PRN
Status: DISCONTINUED | OUTPATIENT
Start: 2022-01-15 | End: 2022-01-21 | Stop reason: HOSPADM

## 2022-01-15 RX ORDER — SODIUM CHLORIDE 9 MG/ML
INJECTION, SOLUTION INTRAVENOUS CONTINUOUS
Status: DISCONTINUED | OUTPATIENT
Start: 2022-01-15 | End: 2022-01-15

## 2022-01-15 RX ORDER — TAMSULOSIN HYDROCHLORIDE 0.4 MG/1
0.4 CAPSULE ORAL DAILY
Status: DISCONTINUED | OUTPATIENT
Start: 2022-01-15 | End: 2022-01-21 | Stop reason: HOSPADM

## 2022-01-15 RX ORDER — DULOXETIN HYDROCHLORIDE 60 MG/1
60 CAPSULE, DELAYED RELEASE ORAL EVERY MORNING
Status: DISCONTINUED | OUTPATIENT
Start: 2022-01-16 | End: 2022-01-21 | Stop reason: HOSPADM

## 2022-01-15 RX ADMIN — NALOXONE HYDROCHLORIDE 1 MG: 1 INJECTION PARENTERAL at 13:48

## 2022-01-15 RX ADMIN — SODIUM CHLORIDE 1000 ML: 9 INJECTION, SOLUTION INTRAVENOUS at 14:06

## 2022-01-15 RX ADMIN — HEPARIN SODIUM 5000 UNITS: 5000 INJECTION INTRAVENOUS; SUBCUTANEOUS at 22:16

## 2022-01-15 RX ADMIN — MAGNESIUM SULFATE HEPTAHYDRATE 1000 MG: 1 INJECTION, SOLUTION INTRAVENOUS at 19:52

## 2022-01-15 RX ADMIN — TAMSULOSIN HYDROCHLORIDE 0.4 MG: 0.4 CAPSULE ORAL at 19:54

## 2022-01-15 RX ADMIN — ASPIRIN 81 MG: 81 TABLET, COATED ORAL at 19:55

## 2022-01-15 RX ADMIN — Medication 5.8 MILLICURIE: at 15:15

## 2022-01-15 RX ADMIN — CEFTRIAXONE SODIUM 1000 MG: 1 INJECTION, POWDER, FOR SOLUTION INTRAMUSCULAR; INTRAVENOUS at 17:42

## 2022-01-15 RX ADMIN — ONDANSETRON 4 MG: 2 INJECTION INTRAMUSCULAR; INTRAVENOUS at 13:49

## 2022-01-15 RX ADMIN — OXYBUTYNIN CHLORIDE 5 MG: 5 TABLET ORAL at 19:54

## 2022-01-15 RX ADMIN — DEXTROSE AND SODIUM CHLORIDE: 5; 900 INJECTION, SOLUTION INTRAVENOUS at 23:05

## 2022-01-15 RX ADMIN — SODIUM CHLORIDE 2899 ML: 9 INJECTION, SOLUTION INTRAVENOUS at 17:40

## 2022-01-15 RX ADMIN — SODIUM CHLORIDE 1000 ML: 9 INJECTION, SOLUTION INTRAVENOUS at 16:24

## 2022-01-15 RX ADMIN — DEXTROSE MONOHYDRATE 12.5 G: 25 INJECTION, SOLUTION INTRAVENOUS at 21:41

## 2022-01-15 RX ADMIN — MEROPENEM 500 MG: 500 INJECTION, POWDER, FOR SOLUTION INTRAVENOUS at 18:44

## 2022-01-15 RX ADMIN — SODIUM CHLORIDE, PRESERVATIVE FREE 10 ML: 5 INJECTION INTRAVENOUS at 19:54

## 2022-01-15 RX ADMIN — Medication 2 MCG/MIN: at 21:37

## 2022-01-15 RX ADMIN — DEXTROSE 15 G: 15 GEL ORAL at 21:27

## 2022-01-15 ASSESSMENT — PAIN DESCRIPTION - PAIN TYPE
TYPE: ACUTE PAIN
TYPE: ACUTE PAIN

## 2022-01-15 ASSESSMENT — ENCOUNTER SYMPTOMS
EYE PAIN: 0
CHEST TIGHTNESS: 0
ABDOMINAL DISTENTION: 0
ABDOMINAL PAIN: 0
EYE DISCHARGE: 0
SHORTNESS OF BREATH: 0
BACK PAIN: 0
FACIAL SWELLING: 0

## 2022-01-15 ASSESSMENT — PAIN DESCRIPTION - FREQUENCY: FREQUENCY: CONTINUOUS

## 2022-01-15 ASSESSMENT — PAIN DESCRIPTION - LOCATION: LOCATION: FOOT

## 2022-01-15 ASSESSMENT — PAIN SCALES - GENERAL: PAINLEVEL_OUTOF10: 7

## 2022-01-15 ASSESSMENT — PAIN DESCRIPTION - ORIENTATION: ORIENTATION: LEFT;RIGHT

## 2022-01-15 NOTE — Clinical Note
Patient Class: Inpatient [101]   REQUIRED: Diagnosis: Acute kidney injury superimposed on chronic kidney disease [facility] [[id number]]   Estimated Length of Stay: Estimated stay of more than 2 midnights   Admitting Provider: Sara Restrepo [[id number]]   Telemetry/Cardiac Monitoring Required?: Yes

## 2022-01-15 NOTE — ED PROVIDER NOTES
EMERGENCY DEPARTMENT ENCOUNTER    Pt Name: Lillian Truong  MRN: [id number]  [place] 1965  Date of evaluation: 1/15/22  CHIEF COMPLAINT       Chief Complaint   Patient presents with    Altered Mental Status     HISTORY OF PRESENT ILLNESS   HPI   Patient is a 48-year-old male with a history of CHF and COPD who presented to the emergency department by EMS from home secondary to altered mental status. History as per EMS who stated sinus pain and altered throughout the day no history of trauma or fall however when EMS arrived patient was alert answering questions appropriately and then fell asleep. Patient stated he is on oxygen but she is used at all times. States that he is vaccinated no known COVID exposure. Denied any trauma or injury. Patient denied difficulty speaking weakness. Patient denied chest pain, shortness of breath, nausea, vomiting, fevers or chills    REVIEW OF SYSTEMS     Review of Systems   Constitutional: Negative for chills, diaphoresis and fever. HENT: Negative for congestion, ear pain and facial swelling. Eyes: Negative for pain, discharge and visual disturbance. Respiratory: Negative for chest tightness and shortness of breath. Cardiovascular: Negative for chest pain and palpitations. Gastrointestinal: Negative for abdominal distention and abdominal pain. Genitourinary: Negative for difficulty urinating and flank pain. Musculoskeletal: Negative for back pain. Skin: Negative for wound. Neurological: Negative for dizziness, light-headedness and headaches. Psychiatric/Behavioral: Positive for confusion.      PASTMEDICAL HISTORY     Past Medical History:   Diagnosis Date    Anxiety and depression     Back pain, chronic     BPH (benign prostatic hyperplasia)     Cellulitis of left lower extremity 8/21/2017    Cellulitis of right lower extremity 8/19/2017    CHF (congestive heart failure) (HCC)     Cirrhosis ([address]     Diabetes mellitus ([address]     not checking bloodsugar at home    Epididymoorchitis     GERD (gastroesophageal reflux disease)     H/O cardiac catheterization not sure of date    no stents    Hepatitis     Pt does not know the type.  Hiatal hernia     History of alcohol abuse     Sober since 2013    Hyperlipidemia     not taking any medication    Hypertension     IBS (irritable bowel syndrome)     Incisional hernia with obstruction but no gangrene 5/20/2018    Klebsiella sepsis ([address] 86/05/5574    Metabolic encephalopathy     Morbid obesity ([address]     Neuropathy     feet,toes    On home oxygen therapy     DME for home oxgygen at 2.5 lpm at HS and as needed    Pancreatitis     x 5 episodes    Poisoning by insulin and oral hypoglycemic (antidiabetic) drugs, accidental (unintentional), initial encounter 1/12/2021    Primary osteoarthritis of right knee     Retention, urine 1/24/2018    SBO (small bowel obstruction) ([address] 5/19/2018    Septic shock ([address]     Sleep apnea     suspected juany, never has had PSG study.   HAS NO MACHINE    Thyroid disease     Urinary bladder neurogenic dysfunction     Urinary tract infection associated with indwelling urethral catheter ([address] 11/4/2020     SURGICAL HISTORY       Past Surgical History:   Procedure Laterality Date    ABDOMEN SURGERY      hernia repair x4 (ventral)    COLONOSCOPY      2012    CYSTOSCOPY  01/24/2018    CYSTOSCOPY  02/20/2019    CYSTOSCOPY N/A 2/20/2019    CYSTOSCOPY DILATION performed by Marques Earl MD at [facility] 8/23/2019    CYSTOSCOPY/ DILATION NICOLE CATHETER EXCHANGE performed by Marques Earl MD at [facility], [address]  05/20/2018    repair and reduction of recurrent incarcerated incisional hernia with mesh    [place] CYSTOURETHROSCOPY N/A 1/24/2018    CYSTOSCOPY Shikha Rawls performed by Marques Earl MD at [facility] Bilateral 8/22/2017    FOOT DEBRIDEMENT INCISION AND DRAINAGE with bone bx performed by Betzaida Hall DPM at [facility] EGD TRANSORAL BIOPSY SINGLE/MULTIPLE N/A 7/19/2017    EGD BIOPSY performed by Adalberto Zaidi MD at [address] [place] [address]  07/19/2017    polypectomy    UPPER GASTROINTESTINAL ENDOSCOPY N/A 3/14/2019    EGD BIOPSY performed by Matias Worthy MD at [facility] N/A 5/20/2018    REPAIR AND REDUCTION OF RECURRENT INCARCERATED INCISIONAL HERNIA WITH MESH performed by Adrian Mancilla MD at [facility]       Previous Medications    ALBUTEROL SULFATE HFA (PROAIR HFA) 108 (90 BASE) MCG/ACT INHALER    Inhale 2 puffs into the lungs every 6 hours as needed for Wheezing    ASPIRIN 81 MG EC TABLET    Take 1 tablet by mouth daily    CLONAZEPAM (KLONOPIN) 0.5 MG TABLET    Take 1 tablet by mouth 2 times daily for 3 days. CLONAZEPAM (KLONOPIN) 0.5 MG TABLET    Take 1 tablet by mouth 2 times daily for 3 days. DIPHENOXYLATE-ATROPINE (LOMOTIL) 2.5-0.025 MG PER TABLET    Take 1 tablet by mouth 4 times daily as needed for Diarrhea.     DULOXETINE (CYMBALTA) 60 MG CAPSULE    Take 60 mg by mouth every morning     ESOMEPRAZOLE (NEXIUM) 40 MG CAPSULE    Take 40 mg by mouth 2 times daily     FLUOCINONIDE (LIDEX) 0.05 % EXTERNAL SOLUTION    Apply topically as needed (scalp itching)    FUROSEMIDE (LASIX) 40 MG TABLET    Take 1 tablet by mouth daily    KETOCONAZOLE (NIZORAL) 2 % SHAMPOO    Apply topically Twice a Week    LEVOTHYROXINE (SYNTHROID) 175 MCG TABLET    Take 175 mcg by mouth Daily     LOSARTAN (COZAAR) 50 MG TABLET    Take 1 tablet by mouth daily    NYSTATIN (MYCOSTATIN) 093980 UNIT/GM CREAM    Apply topically 2 times daily as needed (to skin folds)    NYSTATIN (MYCOSTATIN) 647750 UNIT/GM POWDER    Apply topically 2 times daily as needed TO ABDOMINAL FOLDS, GROIN     OXYBUTYNIN (DITROPAN) 5 MG TABLET    Take 1 tablet by mouth 2 times daily    PREGABALIN (LYRICA) 150 MG CAPSULE    Take 150 mg by mouth 2 times daily. QUETIAPINE (SEROQUEL XR) 50 MG EXTENDED RELEASE TABLET    Take 50 mg by mouth nightly    SITAGLIPTIN (JANUVIA) 100 MG TABLET    Take 100 mg by mouth daily    TAMSULOSIN (FLOMAX) 0.4 MG CAPSULE    Take 1 capsule by mouth daily    TRIAMCINOLONE (KENALOG) 0.025 % CREAM    Apply topically 2 times daily as needed     ALLERGIES     is allergic to no known allergies. FAMILY HISTORY     is adopted. SOCIAL HISTORY       Social History     Tobacco Use    Smoking status: Never Smoker    Smokeless tobacco: Never Used   Vaping Use    Vaping Use: Never used   Substance Use Topics    Alcohol use: No     Comment: quit drinking 2012    Drug use: No     PHYSICAL EXAM     INITIAL VITALS: BP 85/73   Pulse 70   Temp 98 °F (36.7 °C) (Oral)   Resp 12   Ht 6' (1.829 m)   Wt (!) 360 lb (163.3 kg)   SpO2 92%   BMI 48.82 kg/m²    Physical Exam  Vitals and nursing note reviewed. Constitutional:       General: He is not in acute distress. Appearance: He is well-developed. He is not diaphoretic. HENT:      Head: Normocephalic and atraumatic. Eyes:      Pupils: Pupils are equal, round, and reactive to light. Cardiovascular:      Rate and Rhythm: Normal rate and regular rhythm. Pulmonary:      Effort: Pulmonary effort is normal.      Breath sounds: Normal breath sounds. Abdominal:      General: Bowel sounds are normal.      Palpations: Abdomen is soft. Musculoskeletal:         General: Normal range of motion. Cervical back: Normal range of motion and neck supple. Skin:     General: Skin is warm. Capillary Refill: Capillary refill takes less than 2 seconds. Neurological:      Mental Status: He is alert and oriented to person, place, and time. MEDICAL DECISION MAKING:   Patient is a 69-year-old male who presented to the emergency department via EMS secondary to confusion and altered mental status. Blood sugar 74.   Patient also received Narcan. NIH stroke scale 0. Placed on droplet precautions, rapid COVID test obtained. Labs. Patient Clemons was checked for patency by nursing staff. Patient said he has not been drinking any water. Of note, patient was in the emergency department several days ago to urinary retention and Clemons was replaced. Patient states he has been sitting at home not really eating or drinking. Patient will be reevaluated. EKG right bundle branch block which was prior on previous EKG. No acute ST wave elevations or findings consistent with STEMI. Laboratory analysis revealed elevated BUN/creatinine from previous, orders for IV fluids and urine studies obtained. Patient had elevated D-dimer and underwent a VQ scan negative for PE.  COVID-negative. Chest x-ray no infiltrate dissection with thorax. UA possible urinary tract infection, urine culture obtained. Patient is on antibiotics with ceftriaxone. Blood cultures obtained. Bladder scan with only 50 cc of urine. After receiving IV fluids urine output improved as well as blood pressure. .  CT head no acute hemorrhage infarct or tumor, CT of the pelvis without contrast no acute findings. Chest x-ray no infiltrate dissection pneumothorax. Patient discussed with the internal medicine service agreed to admit for further evaluation treatment. Patient discussed with Dr. Donaldo Harvey with nephrology who also agreed to follow patient on admission       All patient's question's and concerns were answered prior to disposition and patient and/or family expressed understanding and agreement of treatment plan. CRITICAL CARE:   CRITICAL CARE TIME     Due to the high probability of sudden and clinically significant deterioration in the patient's condition he required highest level of my preparedness to intervene urgently.  I provided critical care time including documentation time, medication orders and management, reevaluation, vital sign assessment, ordering and reviewing of of lab tests ordering and reviewing of x-ray studies, and admission orders. Aggregate critical care time is between 45 minutes including only time during which I was engaged in work directly related to his care and did not include time spent treating other patients simultaneously. NIH STROKE SCALE:            PROCEDURES:    Procedures    DIAGNOSTIC RESULTS   EKG:All EKG's are interpreted by the Emergency Department Physician who either signs or Co-signs this chart in the absence of a cardiologist.        RADIOLOGY:All plain film, CT, MRI, and formal ultrasound images (except ED bedside ultrasound) are read by the radiologist, see reports below, unless otherwisenoted in MDM or here. CT ABDOMEN PELVIS WO CONTRAST Additional Contrast? None   Final Result   1. Right nonobstructive nephrolithiasis. 2. Cirrhotic liver morphology with sequela of portal hypertension. Consider   nonemergent liver MRI with [facility] for [facility] [address] screening. CT Head WO Contrast   Final Result   No acute intracranial abnormality. Senescent changes including mild cortical   atrophy. NM LUNG SCAN PERFUSION ONLY   Final Result   Low Probability for Pulmonary Embolus. XR CHEST PORTABLE   Final Result   Hypoinflated lungs. Cardiomegaly again seen, when compared to the previous   study performed 11/02/2021. No acute consolidation or infiltrate. Probable   right lung base atelectasis. US RETROPERITONEAL COMPLETE    (Results Pending)     LABS: All lab results were reviewed by myself, and all abnormals are listed below.   Labs Reviewed   CBC WITH AUTO DIFFERENTIAL - Abnormal; Notable for the following components:       Result Value    RBC 3.76 (*)     Hemoglobin 10.1 (*)     Hematocrit 32.4 (*)     RDW 16.2 (*)     Seg Neutrophils 82 (*)     Lymphocytes 9 (*)     Immature Granulocytes 1 (*)     Segs Absolute 9.24 (*)     Absolute Lymph # 0.97 (*)     All other components within normal limits BASIC METABOLIC PANEL W/ REFLEX TO MG FOR LOW K - Abnormal; Notable for the following components:    Glucose 116 (*)     BUN 46 (*)     CREATININE 6.21 (*)     Bun/Cre Ratio 7 (*)     Sodium 133 (*)     Chloride 90 (*)     Anion Gap 20 (*)     GFR Non- 9 (*)     GFR  11 (*)     All other components within normal limits   TROPONIN - Abnormal; Notable for the following components:    Troponin, High Sensitivity 53 (*)     All other components within normal limits   BRAIN NATRIURETIC PEPTIDE - Abnormal; Notable for the following components:    Pro-BNP 1,574 (*)     All other components within normal limits   D-DIMER, QUANTITATIVE - Abnormal; Notable for the following components:    D-Dimer, Quant 1.01 (*)     All other components within normal limits   LACTATE DEHYDROGENASE - Abnormal; Notable for the following components:     (*)     All other components within normal limits   C-REACTIVE PROTEIN - Abnormal; Notable for the following components:    CRP 98.8 (*)     All other components within normal limits   PROCALCITONIN - Abnormal; Notable for the following components:    Procalcitonin 0.42 (*)     All other components within normal limits   LACTATE, SEPSIS - Abnormal; Notable for the following components:    Lactic Acid, Sepsis 3.0 (*)     All other components within normal limits   LACTATE, SEPSIS - Abnormal; Notable for the following components:    Lactic Acid, Sepsis 2.0 (*)     All other components within normal limits   URINALYSIS WITH MICROSCOPIC - Abnormal; Notable for the following components:    Color, UA PADMA (*)     Turbidity UA Cloudy (*)     Bilirubin Urine   (*)     Value: Presumptive positive. Unable to confirm due to unavailability of reagent.     Ketones, Urine 1+ (*)     Specific Gravity, UA 1.037 (*)     Urine Hgb 3+ (*)     Protein, UA 2+ (*)     Nitrite, Urine POSITIVE (*)     Leukocyte Esterase, Urine MOD (*)     Crystals, UA NOT REPORTED NOT REPORTED (*) Bacteria, UA MANY (*)     All other components within normal limits   AMMONIA - Abnormal; Notable for the following components:    Ammonia <10 (*)     All other components within normal limits   HEPATIC FUNCTION PANEL - Abnormal; Notable for the following components:    Albumin 3.4 (*)     Alkaline Phosphatase 163 (*)     All other components within normal limits   MAGNESIUM - Abnormal; Notable for the following components:    Magnesium 1.2 (*)     All other components within normal limits   POC GLUCOSE FINGERSTICK - Abnormal; Notable for the following components:    POC Glucose 74 (*)     All other components within normal limits   ARTERIAL BLOOD GAS, POC - Abnormal; Notable for the following components:    POC pH 7.328 (*)     POC pCO2 51.3 (*)     POC PO2 64.2 (*)     POC O2 SAT 90 (*)     All other components within normal limits   POCT GLUCOSE - Normal   COVID-19, RAPID   CULTURE, URINE   CULTURE, BLOOD 1   CULTURE, BLOOD 2   FERRITIN   TOTAL CO2   OSMOLALITY, URINE   URIC ACID, URINE RANDOM   PROTEIN, URINE, RANDOM   SODIUM, URINE, RANDOM   CHLORIDE, URINE, RANDOM   PROTEIN ELECTROPHORESIS, URINE   MAGNESIUM   OSMOLALITY, URINE   HEMOGLOBIN A1C   TSH WITH REFLEX   COMPREHENSIVE METABOLIC PANEL W/ REFLEX TO MG FOR LOW K   MAGNESIUM   CBC   PROTIME-INR   PROCALCITONIN   POC GLUCOSE FINGERSTICK   POCT GLUCOSE   POCT GLUCOSE       EMERGENCY DEPARTMENTCOURSE:         Vitals:    Vitals:    01/15/22 1800 01/15/22 1803 01/15/22 1814 01/15/22 1834   BP: (!) 57/36 (!) 60/35  85/73   Pulse: 76 75 78 70   Resp: 15 13 14 12   Temp:       TempSrc:       SpO2: 91% (!) 86% 100% 92%   Weight:       Height:           The patient was given the following medications while in the emergency department:  Orders Placed This Encounter   Medications    naloxone (NARCAN) injection 1 mg    ondansetron (ZOFRAN) injection 4 mg    DISCONTD: 0.9 % sodium chloride bolus    0.9 % sodium chloride bolus    technetium albumin aggregated (MAA) solution 5.8 millicurie    0.9 % sodium chloride bolus    0.9 % sodium chloride infusion    DISCONTD: sodium chloride flush 0.9 % injection 5-40 mL    DISCONTD: sodium chloride flush 0.9 % injection 5-40 mL    0.9 % sodium chloride infusion    0.9 % sodium chloride IV bolus 2,899 mL     Order Specific Question:   Was full 30mL/kg fluid bolus ordered? Answer:    Yes    cefTRIAXone (ROCEPHIN) 1000 mg IVPB in 50 mL D5W minibag     Order Specific Question:   Antimicrobial Indications     Answer:   Urinary Tract Infection    albuterol sulfate  (90 Base) MCG/ACT inhaler 2 puff     Order Specific Question:   Initiate RT Bronchodilator Protocol     Answer:   No    aspirin EC tablet 81 mg    DULoxetine (CYMBALTA) extended release capsule 60 mg    pantoprazole (PROTONIX) tablet 40 mg    levothyroxine (SYNTHROID) tablet 175 mcg    oxybutynin (DITROPAN) tablet 5 mg    tamsulosin (FLOMAX) capsule 0.4 mg    0.9 % sodium chloride infusion    sodium chloride flush 0.9 % injection 5-40 mL    sodium chloride flush 0.9 % injection 5-40 mL    0.9 % sodium chloride infusion    OR Linked Order Group     ondansetron (ZOFRAN-ODT) disintegrating tablet 4 mg     ondansetron (ZOFRAN) injection 4 mg    OR Linked Order Group     acetaminophen (TYLENOL) tablet 650 mg     acetaminophen (TYLENOL) suppository 650 mg    sodium polystyrene (KAYEXALATE) 15 GM/60ML suspension 15 g    sodium polystyrene (KAYEXALATE) 15 GM/60ML suspension 30 g    heparin (porcine) injection 5,000 Units    insulin lispro (HUMALOG) injection vial 0-6 Units    insulin lispro (HUMALOG) injection vial 0-3 Units    glucose (GLUTOSE) 40 % oral gel 15 g    dextrose 50 % IV solution    glucagon (rDNA) injection 1 mg    dextrose 5 % solution    meropenem (MERREM) 500 mg IVPB (mini-bag)     Order Specific Question:   Antimicrobial Indications     Answer:   Urinary Tract Infection    magnesium sulfate 1000 mg in dextrose 5% 100 mL IVPB     CONSULTS:  IP CONSULT TO NEPHROLOGY  IP CONSULT TO INTERNAL MEDICINE  IP CONSULT TO INFECTIOUS DISEASES  IP CONSULT TO UROLOGY    FINAL IMPRESSION      1. Acute encephalopathy    2. LEONARDA (acute kidney injury) ([facility] [address]    3. Urinary tract infection associated with catheterization of urinary tract, unspecified indwelling urinary catheter type, initial encounter [facility])          DISPOSITION/PLAN   DISPOSITION Admitted 01/15/2022 05:15:34 PM      PATIENT REFERRED TO:  No follow-up provider specified. DISCHARGE MEDICATIONS:  New Prescriptions    No medications on file     Michelle Solomon MD  Attending Emergency Physician      The care is provided during an unprecedented national emergency due to the novel coronavirus, COVID 19. This note was created with the assistance of a speech-recognition program. While intending to generate a document that actually reflects the content of the visit, no guarantees can be provided that every mistake has been identified and corrected by editing.     Alison Alcantar MD  [phone]

## 2022-01-15 NOTE — ED TRIAGE NOTES
Pt started to give last dose of meds to reconcile,  despite falling asleep, then sts \"Im not sure of any of them\"

## 2022-01-15 NOTE — H&P
[facility]  Office: [address], DO, Davina Espinosa, DO, Karen Krause, DO, Parker Boxer Blood, DO, Peri Henning MD, Timothy Tillman MD, Casimiro Miller MD, Rosemarie Roberts MD, Katie Martinez MD, Angie Duval MD, Dewey Varner MD, Mary Palumbo, DO, Yolette Pablo DO, Nettie Sullivan MD,  Michelle Dill DO, Tiffanie Braun MD, Tianna Mendiola MD, Sree Valladares MD, rGeg Lyon MD, Haley Chaudhry MD, Daleen Lennox, MD, Rufino Acharya MD, Francis Chang [facility], [facility], [facility], Margaret Mendoza, CNP, Jc Angelo, CNS, Helena Granda, CNP, Jennifer Long, [facility], Hamlet Corea, CNP, Tari Holland, CNP, Hildy Shone, [facility], [facility] Wol[facility] PA-C, Irma Agarwal, [facility], Debora Brandt, [facility], Flores Lutz, CNP, Jag Piña, CNP, Deepika Borges, CNP, Marquis Jarquin, CNP, Lance Monsalve, CNP, Renetta Mccord, [facility]    HISTORY AND PHYSICAL EXAMINATION            Date:   1/15/2022  Patient name:  Betito Nunez  Date of admission:  1/15/2022  1:31 PM  MRN:   [id number]  Account:  [de-identified]  YOB: 1965  PCP:    Fidelia Evangelista MD  Room:   [place] [address]  Code Status:    Prior    Chief Complaint:     Chief Complaint   Patient presents with    Altered Mental Status       History Obtained From:     electronic medical record    History of Present Illness:     Betito Nunez is a 61 y.o. Non- / non  male who presents with Altered Mental Status   and is admitted to the hospital for the management of Sepsis ([facility] Per the ER note Patient is a 77-year-old male with a history of CHF and COPD who presented to the emergency department by EMS from home secondary to altered mental status. History as per EMS who stated sinus pain and altered throughout the day no history of trauma or fall however when EMS arrived patient was alert answering questions appropriately and then fell asleep.   Patient stated he is on oxygen but she is used at all times. States that he is vaccinated no known COVID exposure. Denied any trauma or injury. Patient denied difficulty speaking weakness. Patient denied chest pain, shortness of breath, nausea, vomiting, fevers or chills    Patient was seen in our ER on 1/12/2022 related to dysuria. According to the notes his Clemons catheter was changed out. They document no urinary retention after replacement of Clemons. At that visit his BUN and creatinine were 27/3. 69. The ER note from the 12th also notes a blood pressure of 85/50 with a pulse of 66. Patient told the ER staff that he has been running low. Patient was discharged. Today the patient's bun/creatinine is 46/ 6.21. On review of his chart he was hospitalized in November 2021 with a creatinine of 4.62 that trended down to 1.8 after receiving fluids. October 2021 the patient was admitted with pneumonia. According to the notes the patient developed LEONARDA from overdiuresis, ischemic ATN secondary to hypotension. At that time his creatinine bumped to 2.72 with fluids recovered to 1.48. VQ scan done related to elevated D-dimer shows low probability for PE  Chest x-ray shows continued cardiomegaly a that seems unchanged. No acute consolidation or infiltrate. There could be an infiltrate in the right lung base. CT of the head showed no acute abnormality. CT of the abdomen and pelvis without contrast showed right nonobstructed nephrolithiasis and cirrhotic liver morphology with sequela of portal hypertension. Potassium is 4.4  Mag is 1.2  Initial lactic was 3.0 after fluids it came down to 2.0  Blood gas revealed a pH of 7.328, PO2 of 64.2, PCO2 of 51.3 and a bicarb of 26.9. Patient does have a history of home oxygen use and sleep apnea. This  Crp 98.8  Wbc 11.1    UA positive for nitrates, moderate leukocyte esterase, too many to count WBCs too many to count RBCs, many bacteria, 3+ hemoglobin and 5-10 epithelial cells.      He received fluid boluses and rocephin while in the ER. Blood cultures x's 2 pending. Urine culture pending. Urine studies pending. Procalcitonin ordered    Started on Meropenum related to history of MDRO and ID was consulted. Per my assessment he is drowsy but arousable. He follows simple commands but did not offer any conversation. His blood pressure remains in the 60s. I spoke to the ER physician about placing a central line and started the patient on Levophed. Fluid boluses running. Planning to do bladder scan. Patient's urine output has been minimal.  Nephrology has been consulted. Planning to consult urology. Echo done in October 2021 shows an EF of 50%. Past Medical History:     Past Medical History:   Diagnosis Date    Anxiety and depression     Back pain, chronic     BPH (benign prostatic hyperplasia)     Cellulitis of left lower extremity 8/21/2017    Cellulitis of right lower extremity 8/19/2017    CHF (congestive heart failure) (HCC)     Cirrhosis ([address]     Diabetes mellitus ([address]     not checking bloodsugar at home    Epididymoorchitis     GERD (gastroesophageal reflux disease)     H/O cardiac catheterization not sure of date    no stents    Hepatitis     Pt does not know the type.      Hiatal hernia     History of alcohol abuse     Sober since 2013    Hyperlipidemia     not taking any medication    Hypertension     IBS (irritable bowel syndrome)     Incisional hernia with obstruction but no gangrene 5/20/2018    Klebsiella sepsis ([address] 12/95/2249    Metabolic encephalopathy     Morbid obesity ([address]     Neuropathy     feet,toes    On home oxygen therapy     DME for home oxgygen at 2.5 lpm at HS and as needed    Pancreatitis     x 5 episodes    Poisoning by insulin and oral hypoglycemic (antidiabetic) drugs, accidental (unintentional), initial encounter 1/12/2021    Primary osteoarthritis of right knee     Retention, urine 1/24/2018    SBO (small bowel obstruction) ([address] 5/19/2018  Septic shock ([facility] [address]     Sleep apnea     suspected juany, never has had PSG study. HAS NO MACHINE    Thyroid disease     Urinary bladder neurogenic dysfunction     Urinary tract infection associated with indwelling urethral catheter ([facility] [address] 11/4/2020        Past Surgical History:     Past Surgical History:   Procedure Laterality Date    ABDOMEN SURGERY      hernia repair x4 (ventral)    COLONOSCOPY      2012    CYSTOSCOPY  01/24/2018    CYSTOSCOPY  02/20/2019    CYSTOSCOPY N/A 2/20/2019    CYSTOSCOPY DILATION performed by Alejandrina Antonio MD at [address] 8/23/2019    CYSTOSCOPY/ DILATION NICOLE CATHETER EXCHANGE performed by Alejandrina Antonio MD at [address], COLON, DIAGNOSTIC     [address]  05/20/2018    repair and reduction of recurrent incarcerated incisional hernia with mesh    [place] CYSTOURETHROSCOPY N/A 1/24/2018    CYSTOSCOPY Claudia Fatimah performed by Alejandrina Antonio MD at [address] Bilateral 8/22/2017    FOOT [address] [facility] with bone bx performed by Devyn Roa DPM at [address] EGD TRANSORAL BIOPSY SINGLE/MULTIPLE N/A 7/19/2017    EGD BIOPSY performed by Lui Vides MD at [address]  07/19/2017    polypectomy    UPPER GASTROINTESTINAL ENDOSCOPY N/A 3/14/2019    EGD BIOPSY performed by Mayelin Pop MD at [address] [facility] N/A 5/20/2018    REPAIR AND REDUCTION OF RECURRENT INCARCERATED INCISIONAL HERNIA WITH MESH performed by Beryle Martini, MD at [address] [facility]        Medications Prior to Admission:     Prior to Admission medications    Medication Sig Start Date End Date Taking? Authorizing Provider   clonazePAM (KLONOPIN) 0.5 MG tablet Take 1 tablet by mouth 2 times daily for 3 days.  11/4/21 1/15/22 Yes Jeovanny Jin MD   diphenoxylate-atropine (LOMOTIL) 2.5-0.025 MG per tablet Take 1 tablet by mouth 4 times daily as needed for Diarrhea. Yes Historical Provider, MD   albuterol sulfate HFA (PROAIR HFA) 108 (90 Base) MCG/ACT inhaler Inhale 2 puffs into the lungs every 6 hours as needed for Wheezing   Yes Historical Provider, MD   DULoxetine (CYMBALTA) 60 MG capsule Take 60 mg by mouth every morning    Yes Historical Provider, MD   clonazePAM (KLONOPIN) 0.5 MG tablet Take 1 tablet by mouth 2 times daily for 3 days. 10/29/21 11/1/21  Gayathri Gavin PA-C   furosemide (LASIX) 40 MG tablet Take 1 tablet by mouth daily 10/29/21 11/28/21  Gayathri Gavin PA-C   losartan (COZAAR) 50 MG tablet Take 1 tablet by mouth daily 10/30/21   Gayathri Gavin PA-C   pregabalin (LYRICA) 150 MG capsule Take 150 mg by mouth 2 times daily.     Historical Provider, MD   SITagliptin (JANUVIA) 100 MG tablet Take 100 mg by mouth daily    Historical Provider, MD   nystatin (MYCOSTATIN) 946231 UNIT/GM cream Apply topically 2 times daily as needed (to skin folds)    Historical Provider, MD   QUEtiapine (SEROQUEL XR) 50 MG extended release tablet Take 50 mg by mouth nightly    Historical Provider, MD   triamcinolone (KENALOG) 0.025 % cream Apply topically 2 times daily as needed    Historical Provider, MD   fluocinonide (LIDEX) 0.05 % external solution Apply topically as needed (scalp itching)    Historical Provider, MD   ketoconazole (NIZORAL) 2 % shampoo Apply topically Twice a Week    Historical Provider, MD   tamsulosin (FLOMAX) 0.4 MG capsule Take 1 capsule by mouth daily 1/24/18   Toro Rosenthal MD   oxybutynin (DITROPAN) 5 MG tablet Take 1 tablet by mouth 2 times daily 12/12/17   Junior Keny Samayoa DO   aspirin 81 MG EC tablet Take 1 tablet by mouth daily 7/26/17   Esperanza Reese DO   levothyroxine (SYNTHROID) 175 MCG tablet Take 175 mcg by mouth Daily     Historical Provider, MD   nystatin (MYCOSTATIN) 685910 UNIT/GM powder Apply topically 2 times daily as needed TO ABDOMINAL FOLDS, GROIN     Historical Provider, MD   esomeprazole (NEXIUM) 40 MG capsule Take 40 mg by mouth 2 times daily     Historical Provider, MD        Allergies:     No known allergies    Social History:     Tobacco:    reports that he has never smoked. He has never used smokeless tobacco.  Alcohol:      reports no history of alcohol use. Drug Use:  reports no history of drug use. Family History:     Family History   Adopted: Yes   Problem Relation Age of Onset    No Known Problems Mother         Adopted    No Known Problems Father         Adopted       Review of Systems:     Positive and Negative as described in HPI. Review of Systems   Unable to perform ROS: Mental status change       Physical Exam:   BP 85/73   Pulse 70   Temp 98 °F (36.7 °C) (Oral)   Resp 12   Ht 6' (1.829 m)   Wt (!) 360 lb (163.3 kg)   SpO2 92%   BMI 48.82 kg/m²   Temp (24hrs), Av °F (36.7 °C), Min:98 °F (36.7 °C), Max:98 °F (36.7 °C)    Recent Labs     01/15/22  1340 01/15/22  1803   POCGLU 74* 78       Intake/Output Summary (Last 24 hours) at 1/15/2022 1856  Last data filed at 1/15/2022 1626  Gross per 24 hour   Intake --   Output 11.5 ml   Net -11.5 ml       Physical Exam  Vitals and nursing note reviewed. Constitutional:       Appearance: He is obese. He is ill-appearing. HENT:      Mouth/Throat:      Mouth: Mucous membranes are dry. Eyes:      Pupils: Pupils are equal, round, and reactive to light. Cardiovascular:      Rate and Rhythm: Normal rate and regular rhythm. Pulses: Normal pulses. Heart sounds: Murmur heard. Comments: 3+ nonpitting edema to the lower extremities. Patient also has chronic vascular changes  Pulmonary:      Breath sounds: Normal breath sounds. Abdominal:      Palpations: Abdomen is soft. Musculoskeletal:      Right lower leg: 3+ Edema present. Left lower leg: 3+ Edema present. Skin:     General: Skin is warm and dry. Capillary Refill: Capillary refill takes less than 2 seconds.    Neurological:      GCS: GCS eye subscore is 4. GCS verbal subscore is 4. GCS motor subscore is 6. Comments: Patient is drowsy  He is easily arousable but drifts right back off to sleep   Psychiatric:         Attention and Perception: He is inattentive. Comments: Cannot provide any history         Investigations:      Laboratory Testing:  Recent Results (from the past 24 hour(s))   COVID-19, Rapid    Collection Time: 01/15/22  1:35 PM    Specimen: Nasopharyngeal Swab   Result Value Ref Range    Specimen Description . NASOPHARYNGEAL SWAB     SARS-CoV-2, Rapid Not Detected Not Detected   CBC Auto Differential    Collection Time: 01/15/22  1:35 PM   Result Value Ref Range    WBC 11.1 3.5 - 11.3 k/uL    RBC 3.76 (L) 4.21 - 5.77 m/uL    Hemoglobin 10.1 (L) 13.0 - 17.0 g/dL    Hematocrit 32.4 (L) 40.7 - 50.3 %    MCV 86.2 82.6 - 102.9 fL    MCH 26.9 25.2 - 33.5 pg    MCHC 31.2 28.4 - 34.8 g/dL    RDW 16.2 (H) 11.8 - 14.4 %    Platelets 634 670 - 936 k/uL    MPV 10.9 8.1 - 13.5 fL    NRBC Automated 0.0 0.0 per 100 WBC    Differential Type NOT REPORTED     Seg Neutrophils 82 (H) 36 - 65 %    Lymphocytes 9 (L) 24 - 43 %    Monocytes 6 3 - 12 %    Eosinophils % 1 1 - 4 %    Basophils 1 0 - 2 %    Immature Granulocytes 1 (H) 0 %    Segs Absolute 9.24 (H) 1.50 - 8.10 k/uL    Absolute Lymph # 0.97 (L) 1.10 - 3.70 k/uL    Absolute Mono # 0.61 0.10 - 1.20 k/uL    Absolute Eos # 0.10 0.00 - 0.44 k/uL    Basophils Absolute 0.05 0.00 - 0.20 k/uL    Absolute Immature Granulocyte 0.14 0.00 - 0.30 k/uL    WBC Morphology NOT REPORTED     RBC Morphology ANISOCYTOSIS PRESENT     Platelet Estimate NOT REPORTED    Basic Metabolic Panel w/ Reflex to MG    Collection Time: 01/15/22  1:35 PM   Result Value Ref Range    Glucose 116 (H) 70 - 99 mg/dL    BUN 46 (H) 6 - 20 mg/dL    CREATININE 6.21 (HH) 0.70 - 1.20 mg/dL    Bun/Cre Ratio 7 (L) 9 - 20    Calcium 8.9 8.6 - 10.4 mg/dL    Sodium 133 (L) 135 - 144 mmol/L    Potassium 4.4 3.7 - 5.3 mmol/L    Chloride 90 (L) 98 - 107 mmol/L    CO2 23 20 - 31 mmol/L    Anion Gap 20 (H) 9 - 17 mmol/L    GFR Non-African American 9 (L) >60 mL/min    GFR  11 (L) >60 mL/min    GFR Comment          GFR Staging NOT REPORTED    Troponin    Collection Time: 01/15/22  1:35 PM   Result Value Ref Range    Troponin, High Sensitivity 53 (HH) 0 - 22 ng/L    Troponin T NOT REPORTED <0.03 ng/mL    Troponin Interp NOT REPORTED    Brain Natriuretic Peptide    Collection Time: 01/15/22  1:35 PM   Result Value Ref Range    Pro-BNP 1,574 (H) <300 pg/mL    BNP Interpretation NOT REPORTED    D-Dimer, Quantitative    Collection Time: 01/15/22  1:35 PM   Result Value Ref Range    D-Dimer, Quant 1.01 (H) 0.00 - 0.59 mg/L FEU   FERRITIN    Collection Time: 01/15/22  1:35 PM   Result Value Ref Range    Ferritin 160 30 - 400 ug/L   LACTATE DEHYDROGENASE    Collection Time: 01/15/22  1:35 PM   Result Value Ref Range     (L) 135 - 225 U/L   C-Reactive Protein    Collection Time: 01/15/22  1:35 PM   Result Value Ref Range    CRP 98.8 (H) 0.0 - 5.0 mg/L   Procalcitonin    Collection Time: 01/15/22  1:35 PM   Result Value Ref Range    Procalcitonin 0.42 (H) <0.09 ng/mL   Lactate, Sepsis    Collection Time: 01/15/22  1:35 PM   Result Value Ref Range    Lactic Acid, Sepsis 3.0 (H) 0.5 - 1.9 mmol/L    Lactic Acid, Sepsis, Whole Blood NOT REPORTED 0.5 - 1.9 mmol/L   Hepatic Function Panel    Collection Time: 01/15/22  1:35 PM   Result Value Ref Range    Albumin 3.4 (L) 3.5 - 5.2 g/dL    Alkaline Phosphatase 163 (H) 40 - 129 U/L    ALT 12 5 - 41 U/L    AST 14 <40 U/L    Total Bilirubin 0.42 0.3 - 1.2 mg/dL    Bilirubin, Direct 0.17 <0.31 mg/dL    Bilirubin, Indirect 0.25 0.00 - 1.00 mg/dL    Total Protein 7.8 6.4 - 8.3 g/dL    Globulin NOT REPORTED 1.5 - 3.8 g/dL    Albumin/Globulin Ratio NOT REPORTED 1.0 - 2.5   POCT Glucose    Collection Time: 01/15/22  1:40 PM   Result Value Ref Range    Glucose 74 mg/dL    QC OK? ok    POC Glucose Fingerstick    Collection Time: 01/15/22  1:40 PM   Result Value Ref Range    POC Glucose 74 (L) 75 - 110 mg/dL   Ammonia    Collection Time: 01/15/22  1:45 PM   Result Value Ref Range    Ammonia <10 (L) 16 - 60 umol/L   Arterial Blood Gas, POC    Collection Time: 01/15/22  2:09 PM   Result Value Ref Range    POC pH 7.328 (L) 7.350 - 7.450    POC pCO2 51.3 (H) 35.0 - 48.0 mm Hg    POC PO2 64.2 (L) 83.0 - 108.0 mm Hg    POC HCO3 26.9 21.0 - 28.0 mmol/L    TCO2 (calc), Art NOT REPORTED 22.0 - 29.0 mmol/L    Negative Base Excess, Art NOT REPORTED 0.0 - 2.0    Positive Base Excess, Art 0 0.0 - 3.0    POC O2 SAT 90 (L) 94.0 - 98.0 %    O2 Device/Flow/% NOT REPORTED     Shay Test NOT REPORTED     Sample Site NOT REPORTED     Mode NOT REPORTED     FIO2 30.0     Pt Temp 37.0     POC pH Temp NOT REPORTED     POC pCO2 Temp NOT REPORTED mm Hg    POC pO2 Temp NOT REPORTED mm Hg   TOTAL CO2    Collection Time: 01/15/22  2:09 PM   Result Value Ref Range    POC TCO2 28 22 - 30 mmol/L   Urinalysis with Microscopic    Collection Time: 01/15/22  2:55 PM   Result Value Ref Range    Color, UA PADMA (A) Yellow    Turbidity UA Cloudy (A) Clear    Glucose, Ur NEGATIVE NEGATIVE    Bilirubin Urine (A) NEGATIVE     Presumptive positive. Unable to confirm due to unavailability of reagent.     Ketones, Urine 1+ (A) NEGATIVE    Specific Gravity, UA 1.037 (H) 1.005 - 1.030    Urine Hgb 3+ (A) NEGATIVE    pH, UA 5.0 5.0 - 8.0    Protein, UA 2+ (A) NEGATIVE    Urobilinogen, Urine Normal Normal    Nitrite, Urine POSITIVE (A) NEGATIVE    Leukocyte Esterase, Urine MOD (A) NEGATIVE    Urinalysis Comments NOT REPORTED     -          WBC, UA TOO NUMEROUS TO COUNT 0 - 5 /HPF    RBC, UA TOO NUMEROUS TO COUNT 0 - 2 /HPF    Casts UA NOT REPORTED /LPF    Crystals, UA NOT REPORTED NOT REPORTED (A) None /HPF    Epithelial Cells UA 5 TO 10 0 - 5 /HPF    Renal Epithelial, UA NOT REPORTED 0 /HPF    Bacteria, UA MANY (A) None    Mucus, UA NOT REPORTED None    Trichomonas, UA NOT REPORTED None    Amorphous, UA NOT REPORTED None    Other Observations UA NOT REPORTED NOT REQ. Yeast, UA NOT REPORTED None   OSMOLALITY, URINE    Collection Time: 01/15/22  3:00 PM   Result Value Ref Range    Osmolality, Ur 302 300 - 1170 mOsm/kg   Lactate, Sepsis    Collection Time: 01/15/22  4:18 PM   Result Value Ref Range    Lactic Acid, Sepsis 2.0 (H) 0.5 - 1.9 mmol/L    Lactic Acid, Sepsis, Whole Blood NOT REPORTED 0.5 - 1.9 mmol/L   Magnesium    Collection Time: 01/15/22  5:24 PM   Result Value Ref Range    Magnesium 1.2 (L) 1.6 - 2.6 mg/dL   POC Glucose Fingerstick    Collection Time: 01/15/22  6:03 PM   Result Value Ref Range    POC Glucose 78 75 - 110 mg/dL       Imaging/Diagnostics:  CT ABDOMEN PELVIS WO CONTRAST Additional Contrast? None    Result Date: 1/15/2022  1. Right nonobstructive nephrolithiasis. 2. Cirrhotic liver morphology with sequela of portal hypertension. Consider nonemergent liver MRI with [employer] for [address] screening. CT Head WO Contrast    Result Date: 1/15/2022  No acute intracranial abnormality. Senescent changes including mild cortical atrophy. NM LUNG SCAN PERFUSION ONLY    Result Date: 1/15/2022  Low Probability for Pulmonary Embolus. XR CHEST PORTABLE    Result Date: 1/15/2022  Hypoinflated lungs. Cardiomegaly again seen, when compared to the previous study performed 11/02/2021. No acute consolidation or infiltrate. Probable right lung base atelectasis.        Assessment :      Hospital Problems           Last Modified POA    * (Principal) Sepsis ([address] 3/14/7634 Yes    Alcoholic cirrhosis of liver ([address] sober since 2013 1/15/2022 Yes    Bipolar disorder ([address] 1/15/2022 Yes    Type 2 diabetes mellitus with diabetic neuropathy, without long-term current use of insulin ([address] 1/15/2022 Yes    Essential hypertension, currently hypotensive 1/15/2022 Yes    FABI (obstructive sleep apnea) 1/15/2022 Yes    Type 2 diabetes mellitus with diabetic neuropathy ([address] (Chronic) 1/15/2022 Yes    Acquired hypothyroidism 1/15/2022 Yes    Venous stasis dermatitis of both lower extremities (Chronic) 1/15/2022 Yes    Chronic indwelling Clemons catheter (Chronic) 1/15/2022 Yes    BPH (benign prostatic hyperplasia) 4/97/5644 Yes    Diastolic congestive heart failure ([facility] 1/15/2022 Yes    On home oxygen therapy 1/15/2022 Yes    Overview Signed 11/4/2020  2:08 PM by SUNSHINE Lord NP     prn         Urinary bladder neurogenic dysfunction 1/15/2022 Yes    Urinary tract infection associated with indwelling urethral catheter ([facility] 1/15/2022 Yes    Acute kidney injury superimposed on chronic kidney disease ([facility] 1/15/2022 Yes          Plan:     Patient status inpatient in the  Medical ICU    Sepsis likely related to UTI; start meropenem with dose adjusted related to creatinine clearance. Consult ID. Blood cultures x2 and urine culture pending. Check procalcitonin    AMS/somnolence; likely related to sepsis and hypotension. Hold Klonopin, Seroquel, and Lyrica for now    Patient does have a history of sleep apnea and home oxygen use; monitor closely for CO2 retention. Continue oxygen to maintain sat greater than 90%    LEONARDA superimposed on CKD likely related to sepsis and hypotension; nephrology consulted. I spoke with the ER physician about placing a central line and starting Levophed if the fluid boluses failed to correct his blood pressure. Hold Lasix and losartan. Check urine studies including osmolality, chloride, sodium, protein and protein electrophoresis    Mag level 1.2; plan to 1 g of magnesium and refer to nephrology for further dosing    Essential hypertension; hold antihypertensives for now. History of chronic Clemons/BPH and neurogenic bladder; patient recently required Clemons change. Continue home Ditropan and Flomax bladder scan.   Consult urology    Type 2 diabetes; start insulin sliding scale    Heparin for DVT prophylaxis    Resume home Synthroid and check TSH level        Consultations:   IP CONSULT TO NEPHROLOGY  IP CONSULT TO INTERNAL MEDICINE  IP CONSULT TO INFECTIOUS DISEASES    Patient is admitted as inpatient status because of co-morbidities listed above, severity of signs and symptoms as outlined, requirement for current medical therapies and most importantly because of direct risk to patient if care not provided in a hospital setting. Expected length of stay > 48 hours.     Earl Rivas, SUNSHINE - CNP  1/15/2022  6:56 PM    Copy sent to Dr. Conchita Cisneros MD

## 2022-01-15 NOTE — ED NOTES
Bed: 27  Expected date: 1/15/22  Expected time: 1:17 PM  Means of arrival: Ambulance  Comments:  [facility] [address] [place] [address].  James Johnson RN  01/15/22 2336

## 2022-01-16 PROBLEM — J96.02 ACUTE RESPIRATORY ACIDOSIS (HCC): Status: ACTIVE | Noted: 2022-01-16

## 2022-01-16 LAB
ACTION: NORMAL
ALBUMIN SERPL-MCNC: 3 G/DL (ref 3.5–5.2)
ALBUMIN SERPL-MCNC: 3.2 G/DL (ref 3.5–5.2)
ALBUMIN/GLOBULIN RATIO: ABNORMAL (ref 1–2.5)
ALLEN TEST: ABNORMAL
ALLEN TEST: POSITIVE
ALP BLD-CCNC: 148 U/L (ref 40–129)
ALT SERPL-CCNC: 11 U/L (ref 5–41)
ANION GAP SERPL CALCULATED.3IONS-SCNC: 12 MMOL/L (ref 9–17)
ANION GAP SERPL CALCULATED.3IONS-SCNC: 13 MMOL/L (ref 9–17)
AST SERPL-CCNC: 12 U/L
BILIRUB SERPL-MCNC: 0.27 MG/DL (ref 0.3–1.2)
BUN BLDV-MCNC: 42 MG/DL (ref 6–20)
BUN BLDV-MCNC: 46 MG/DL (ref 6–20)
BUN/CREAT BLD: 9 (ref 9–20)
BUN/CREAT BLD: 9 (ref 9–20)
CALCIUM SERPL-MCNC: 7.8 MG/DL (ref 8.6–10.4)
CALCIUM SERPL-MCNC: 8.1 MG/DL (ref 8.6–10.4)
CHLORIDE BLD-SCNC: 100 MMOL/L (ref 98–107)
CHLORIDE BLD-SCNC: 98 MMOL/L (ref 98–107)
CHP ED QC CHECK: YES
CO2: 24 MMOL/L (ref 20–31)
CO2: 24 MMOL/L (ref 20–31)
CREAT SERPL-MCNC: 4.51 MG/DL (ref 0.7–1.2)
CREAT SERPL-MCNC: 5.22 MG/DL (ref 0.7–1.2)
DATE AND TIME: NORMAL
ESTIMATED AVERAGE GLUCOSE: 134 MG/DL
FIO2: ABNORMAL
FIO2: ABNORMAL
GFR AFRICAN AMERICAN: 14 ML/MIN
GFR AFRICAN AMERICAN: 16 ML/MIN
GFR NON-AFRICAN AMERICAN: 11 ML/MIN
GFR NON-AFRICAN AMERICAN: 14 ML/MIN
GFR SERPL CREATININE-BSD FRML MDRD: ABNORMAL ML/MIN/{1.73_M2}
GLUCOSE BLD-MCNC: 105 MG/DL (ref 70–99)
GLUCOSE BLD-MCNC: 126 MG/DL (ref 75–110)
GLUCOSE BLD-MCNC: 131 MG/DL (ref 75–110)
GLUCOSE BLD-MCNC: 132 MG/DL (ref 75–110)
GLUCOSE BLD-MCNC: 144 MG/DL (ref 75–110)
GLUCOSE BLD-MCNC: 162 MG/DL (ref 75–110)
GLUCOSE BLD-MCNC: 173 MG/DL (ref 70–99)
GLUCOSE BLD-MCNC: 92 MG/DL
GLUCOSE BLD-MCNC: 92 MG/DL (ref 75–110)
HBA1C MFR BLD: 6.3 % (ref 4–6)
HCT VFR BLD CALC: 33.5 % (ref 40.7–50.3)
HEMOGLOBIN: 10.4 G/DL (ref 13–17)
INR BLD: 0.9
MAGNESIUM: 1.4 MG/DL (ref 1.6–2.6)
MAGNESIUM: 1.8 MG/DL (ref 1.6–2.6)
MCH RBC QN AUTO: 26.9 PG (ref 25.2–33.5)
MCHC RBC AUTO-ENTMCNC: 31 G/DL (ref 28.4–34.8)
MCV RBC AUTO: 86.8 FL (ref 82.6–102.9)
MODE: ABNORMAL
MODE: ABNORMAL
NEGATIVE BASE EXCESS, ART: ABNORMAL (ref 0–2)
NEGATIVE BASE EXCESS, ART: ABNORMAL (ref 0–2)
NOTIFY: NORMAL
NRBC AUTOMATED: 0 PER 100 WBC
O2 DEVICE/FLOW/%: ABNORMAL
O2 DEVICE/FLOW/%: ABNORMAL
PATIENT TEMP: ABNORMAL
PATIENT TEMP: ABNORMAL
PDW BLD-RTO: 16.5 % (ref 11.8–14.4)
PHOSPHORUS: 5.6 MG/DL (ref 2.5–4.5)
PLATELET # BLD: 170 K/UL (ref 138–453)
PMV BLD AUTO: 10.1 FL (ref 8.1–13.5)
POC HCO3: 29 MMOL/L (ref 21–28)
POC HCO3: 29.7 MMOL/L (ref 21–28)
POC O2 SATURATION: 93 % (ref 94–98)
POC O2 SATURATION: 94 % (ref 94–98)
POC PCO2 TEMP: ABNORMAL MM HG
POC PCO2 TEMP: ABNORMAL MM HG
POC PCO2: 64 MM HG (ref 35–48)
POC PCO2: 72.2 MM HG (ref 35–48)
POC PH TEMP: ABNORMAL
POC PH TEMP: ABNORMAL
POC PH: 7.22 (ref 7.35–7.45)
POC PH: 7.26 (ref 7.35–7.45)
POC PO2 TEMP: ABNORMAL MM HG
POC PO2 TEMP: ABNORMAL MM HG
POC PO2: 78.3 MM HG (ref 83–108)
POC PO2: 88.9 MM HG (ref 83–108)
POSITIVE BASE EXCESS, ART: 1 (ref 0–3)
POSITIVE BASE EXCESS, ART: 1 (ref 0–3)
POTASSIUM SERPL-SCNC: 4.1 MMOL/L (ref 3.7–5.3)
POTASSIUM SERPL-SCNC: 4.2 MMOL/L (ref 3.7–5.3)
PROCALCITONIN: 0.34 NG/ML
PROTHROMBIN TIME: 12.5 SEC (ref 11.5–14.2)
RBC # BLD: 3.86 M/UL (ref 4.21–5.77)
READ BACK: NO
SAMPLE SITE: ABNORMAL
SAMPLE SITE: ABNORMAL
SODIUM BLD-SCNC: 134 MMOL/L (ref 135–144)
SODIUM BLD-SCNC: 137 MMOL/L (ref 135–144)
TCO2 (CALC), ART: ABNORMAL MMOL/L (ref 22–29)
TCO2 (CALC), ART: ABNORMAL MMOL/L (ref 22–29)
TOTAL PROTEIN: 6.7 G/DL (ref 6.4–8.3)
TSH SERPL DL<=0.05 MIU/L-ACNC: 1.95 MIU/L (ref 0.3–5)
WBC # BLD: 11 K/UL (ref 3.5–11.3)

## 2022-01-16 PROCEDURE — 2000000000 HC ICU R&B

## 2022-01-16 PROCEDURE — 94640 AIRWAY INHALATION TREATMENT: CPT

## 2022-01-16 PROCEDURE — 6360000002 HC RX W HCPCS: Performed by: NURSE PRACTITIONER

## 2022-01-16 PROCEDURE — 94761 N-INVAS EAR/PLS OXIMETRY MLT: CPT

## 2022-01-16 PROCEDURE — 2500000003 HC RX 250 WO HCPCS: Performed by: INTERNAL MEDICINE

## 2022-01-16 PROCEDURE — 99233 SBSQ HOSP IP/OBS HIGH 50: CPT | Performed by: INTERNAL MEDICINE

## 2022-01-16 PROCEDURE — 2580000003 HC RX 258: Performed by: INTERNAL MEDICINE

## 2022-01-16 PROCEDURE — 82947 ASSAY GLUCOSE BLOOD QUANT: CPT

## 2022-01-16 PROCEDURE — 83735 ASSAY OF MAGNESIUM: CPT

## 2022-01-16 PROCEDURE — 2700000000 HC OXYGEN THERAPY PER DAY

## 2022-01-16 PROCEDURE — 94660 CPAP INITIATION&MGMT: CPT

## 2022-01-16 PROCEDURE — 99222 1ST HOSP IP/OBS MODERATE 55: CPT | Performed by: INTERNAL MEDICINE

## 2022-01-16 PROCEDURE — 80053 COMPREHEN METABOLIC PANEL: CPT

## 2022-01-16 PROCEDURE — 2580000003 HC RX 258: Performed by: NURSE PRACTITIONER

## 2022-01-16 PROCEDURE — 85610 PROTHROMBIN TIME: CPT

## 2022-01-16 PROCEDURE — 6370000000 HC RX 637 (ALT 250 FOR IP): Performed by: NURSE PRACTITIONER

## 2022-01-16 PROCEDURE — 36600 WITHDRAWAL OF ARTERIAL BLOOD: CPT

## 2022-01-16 PROCEDURE — 82803 BLOOD GASES ANY COMBINATION: CPT

## 2022-01-16 PROCEDURE — 85027 COMPLETE CBC AUTOMATED: CPT

## 2022-01-16 PROCEDURE — 2500000003 HC RX 250 WO HCPCS: Performed by: NURSE PRACTITIONER

## 2022-01-16 PROCEDURE — 6370000000 HC RX 637 (ALT 250 FOR IP): Performed by: INTERNAL MEDICINE

## 2022-01-16 RX ORDER — MAGNESIUM SULFATE IN WATER 40 MG/ML
2000 INJECTION, SOLUTION INTRAVENOUS ONCE
Status: COMPLETED | OUTPATIENT
Start: 2022-01-16 | End: 2022-01-16

## 2022-01-16 RX ORDER — METHYLPREDNISOLONE SODIUM SUCCINATE 40 MG/ML
40 INJECTION, POWDER, LYOPHILIZED, FOR SOLUTION INTRAMUSCULAR; INTRAVENOUS EVERY 12 HOURS
Status: DISCONTINUED | OUTPATIENT
Start: 2022-01-16 | End: 2022-01-19

## 2022-01-16 RX ORDER — MIDODRINE HYDROCHLORIDE 10 MG/1
10 TABLET ORAL 3 TIMES DAILY
Status: DISCONTINUED | OUTPATIENT
Start: 2022-01-16 | End: 2022-01-21 | Stop reason: HOSPADM

## 2022-01-16 RX ORDER — IPRATROPIUM BROMIDE AND ALBUTEROL SULFATE 2.5; .5 MG/3ML; MG/3ML
1 SOLUTION RESPIRATORY (INHALATION) 4 TIMES DAILY
Status: DISCONTINUED | OUTPATIENT
Start: 2022-01-16 | End: 2022-01-20

## 2022-01-16 RX ADMIN — MEROPENEM 500 MG: 500 INJECTION, POWDER, FOR SOLUTION INTRAVENOUS at 05:57

## 2022-01-16 RX ADMIN — MAGNESIUM SULFATE HEPTAHYDRATE 2000 MG: 40 INJECTION, SOLUTION INTRAVENOUS at 02:32

## 2022-01-16 RX ADMIN — SODIUM CHLORIDE 0.4 MCG/KG/HR: 9 INJECTION, SOLUTION INTRAVENOUS at 22:51

## 2022-01-16 RX ADMIN — HEPARIN SODIUM 5000 UNITS: 5000 INJECTION INTRAVENOUS; SUBCUTANEOUS at 15:24

## 2022-01-16 RX ADMIN — Medication 9 MCG/MIN: at 22:47

## 2022-01-16 RX ADMIN — MEROPENEM 500 MG: 500 INJECTION, POWDER, FOR SOLUTION INTRAVENOUS at 17:26

## 2022-01-16 RX ADMIN — OXYBUTYNIN CHLORIDE 5 MG: 5 TABLET ORAL at 21:57

## 2022-01-16 RX ADMIN — IPRATROPIUM BROMIDE AND ALBUTEROL SULFATE 1 AMPULE: .5; 2.5 SOLUTION RESPIRATORY (INHALATION) at 23:45

## 2022-01-16 RX ADMIN — SODIUM CHLORIDE 0.2 MCG/KG/HR: 9 INJECTION, SOLUTION INTRAVENOUS at 17:25

## 2022-01-16 RX ADMIN — HEPARIN SODIUM 5000 UNITS: 5000 INJECTION INTRAVENOUS; SUBCUTANEOUS at 21:57

## 2022-01-16 RX ADMIN — HEPARIN SODIUM 5000 UNITS: 5000 INJECTION INTRAVENOUS; SUBCUTANEOUS at 05:58

## 2022-01-16 ASSESSMENT — PAIN SCALES - GENERAL: PAINLEVEL_OUTOF10: 0

## 2022-01-16 NOTE — PROCEDURES
PROCEDURE NOTE - CENTRAL VENOUS LINE PLACEMENT    PATIENT NAME: [facility] RECORD NO. [id number]  DATE: 1/16/2022    PREOPERATIVE DIAGNOSIS:  vascular access, poor peripheral access and centrally administered medications  POSTOPERATIVE DIAGNOSIS:  Same  PROCEDURE PERFORMED:  Right Femoral Vein Central Line Insertion  PERFORMING PHYSICIAN: Tyrel Vidal DO  ANESTHESIA:  Local utilizing 1% lidocaine  ESTIMATED BLOOD LOSS:  Less than 25 ml  COMPLICATIONS:  None immediately appreciated. DISCUSSION:  Akhil Abraham is a 61y.o.-year-old male who requires central IV access vascular access, poor peripheral access and centrally administered medications. The history and physical examination were reviewed and confirmed. CONSENT: Unable to be obtained due to patient's condition. PROCEDURE:  A timeout was initiated by the bedside nurse and was confirmed by those present. The patient was placed in a supine position. The skin overlying the Right Femoral Vein was prepped with chlorhexadine and draped in sterile fashion. The skin was infiltrated with local anesthetic. The vessel and surrounding anatomy was visualized using ultrasound. Through the anesthetized region, the introducer needle was inserted into the femoral vein returning dark red non pulsatile blood. A guidewire was placed through the center of the needle with no resistance. Ultrasound confirmed presence of wire in the vein. A small incision made in the skin with a #11 scalpel blade. The dilator was inserted into the skin and vein over guidewire using Seldinger technique. The dilator was then removed and the 7F 20cm catheter was placed in the vein over the guidewire using Seldinger technique. The guidewire was then removed and all ports aspirated and flushed appropriately. The catheter then secured using silk suture and a temporary sterile dressing was applied. No immediate complication was evident.   All sponge, instrument and needle counts were correct at the completion of the procedure. The patient tolerated the procedure well with no immediate complication evident.      Patricia Saavedra DO  12:57 AM, 1/16/22

## 2022-01-16 NOTE — CONSULTS
[facility]  Initial Consult Note  Date: 1/16/2022    Hospital day :1     Impression:   1. Encephalopathy likely toxic metabolic  2. Acute on chronic respiratory failure-hypercapnic and the patient on chronic home O2 therapy  3. Morbid obesity with obstructive sleep apnea  4. Sepsis with concern for septic shock  5. Alcoholic cirrhosis  6. Diabetes mellitus type 2 with associated diabetic neuropathy  7. Essential hypertension  8. Venous stasis dermatitis of lower extremities-chronic  9. Urinary retention with a chronic indwelling Clemons catheter and concern for catheter associated UTI  10. Diastolic congestive heart failure    Recommendations   · The patient was empirically started on antimicrobial therapy with meropenem for sepsis felt secondary to urinary tract infection. · The patient has been on BiPAP and the mentation overall has improved though he did not answer any questions for me. · The patient was empirically started on meropenem due to concern for sepsis and septic shock. · The culture data thus far remains negative. · We will follow his clinical progress and adjust therapy accordingly    Chief complaint/reason for consultation:   Sepsis, septic shock, UTI    History of Present Illness:   Db Simmons is a 61y.o.-year-old male who was initially admitted on 1/15/2022. Sofia Hu has a history of morbid obesity with a BMI of 57, diabetes mellitus type 2, essential hypertension, congestive heart failure, urinary retention and has an indwelling Clemons catheter, chronic pancreatitis, among other medical problems. The patient has been hospitalized multiple times and he presented to the hospital due to altered mental status. The patient apparently was answering questions appropriately when EMS arrived but subsequently fell asleep. He is on home oxygen and in the emergency department the patient was confused.   Work-up did include a CT scan of the abdomen pelvis that showed right-sided nonobstructive for lithiasis, cirrhotic liver morphology with sequelae of portal hypertension. A CT of the head showed no acute intracranial abnormality. VQ scan showed low probability for pulmonary embolism  Chest x-ray showed hypoinflated lungs with cardiomegaly again seen no acute consolidation or infiltrate and probable right lung base atelectasis. The patient was admitted for acute encephalopathy, acute kidney injury, and concern for urinary tract infection. I have personally reviewed the past medical history, past surgical history, medications, social history, and family history, and I have updated the database accordingly. Past Medical History:     Past Medical History:   Diagnosis Date    Anxiety and depression     Back pain, chronic     BPH (benign prostatic hyperplasia)     Cellulitis of left lower extremity 8/21/2017    Cellulitis of right lower extremity 8/19/2017    CHF (congestive heart failure) (HCC)     Cirrhosis ([address]     Diabetes mellitus ([address]     not checking bloodsugar at home    Epididymoorchitis     GERD (gastroesophageal reflux disease)     H/O cardiac catheterization not sure of date    no stents    Hepatitis     Pt does not know the type.      Hiatal hernia     History of alcohol abuse     Sober since 2013    Hyperlipidemia     not taking any medication    Hypertension     IBS (irritable bowel syndrome)     Incisional hernia with obstruction but no gangrene 5/20/2018    Klebsiella sepsis ([address] 09/66/0006    Metabolic encephalopathy     Morbid obesity ([address]     Neuropathy     feet,toes    On home oxygen therapy     DME for home oxgygen at 2.5 lpm at HS and as needed    On home oxygen therapy     pt uses 2-3L NC @ home    Pancreatitis     x 5 episodes    Poisoning by insulin and oral hypoglycemic (antidiabetic) drugs, accidental (unintentional), initial encounter 1/12/2021    Primary osteoarthritis of right knee     Retention, urine 1/24/2018    SBO (small bowel obstruction) ([facility] 5/19/2018    Septic shock (HCC)     Sleep apnea     suspected juany, never has had PSG study. HAS NO MACHINE    Sleep apnea, obstructive     pt noncompliant w/ CPAP @ home.      Thyroid disease     Urinary bladder neurogenic dysfunction     Urinary tract infection associated with indwelling urethral catheter ([facility] 11/4/2020     Past Surgical  History:     Past Surgical History:   Procedure Laterality Date    ABDOMEN SURGERY      hernia repair x4 (ventral)    COLONOSCOPY      2012    CYSTOSCOPY  01/24/2018    CYSTOSCOPY  02/20/2019    CYSTOSCOPY N/A 2/20/2019    CYSTOSCOPY DILATION performed by Siobhan Higuera MD at [facility] 8/23/2019    CYSTOSCOPY/ DILATION NICOLE CATHETER EXCHANGE performed by Siobhan Higuera MD at [facility]     [facility]  05/20/2018    repair and reduction of recurrent incarcerated incisional hernia with mesh    [place] CYSTOURETHROSCOPY N/A 1/24/2018    CYSTOSCOPY Glendy F[place] performed by Siobhan Higuera MD at [address] Bilateral 8/22/2017    FOOT [address] [facility] with bone bx performed by Ladonna Morley DPM at [address] EGD TRANSORAL BIOPSY SINGLE/MULTIPLE N/A 7/19/2017    EGD BIOPSY performed by Ra Jacobo MD at [address]  07/19/2017    polypectomy    UPPER GASTROINTESTINAL ENDOSCOPY N/A 3/14/2019    EGD BIOPSY performed by Travis Cedeño MD at [address] N/A 5/20/2018    REPAIR AND REDUCTION OF RECURRENT INCARCERATED INCISIONAL HERNIA WITH MESH performed by Seb Peres MD at [address] [facility]     Medications:      sodium chloride  1,000 mL IntraVENous Once    aspirin  81 mg Oral Daily    DULoxetine  60 mg Oral QAM    pantoprazole  40 mg Oral QAM AC    levothyroxine  175 mcg Oral Daily    oxybutynin  5 mg Oral BID    tamsulosin  0.4 mg Oral Daily  sodium chloride flush  5-40 mL IntraVENous 2 times per day    heparin (porcine)  5,000 Units SubCUTAneous 3 times per day    insulin lispro  0-6 Units SubCUTAneous TID WC    insulin lispro  0-3 Units SubCUTAneous Nightly    meropenem  500 mg IntraVENous Q12H     Social History:     Social History     Socioeconomic History    Marital status:      Spouse name: Not on file    Number of children: Not on file    Years of education: Not on file    Highest education level: Not on file   Occupational History    Not on file   Tobacco Use    Smoking status: Never Smoker    Smokeless tobacco: Never Used   Vaping Use    Vaping Use: Never used   Substance and Sexual Activity    Alcohol use: No     Comment: quit drinking 2012    Drug use: No    Sexual activity: Not on file   Other Topics Concern    Not on file   Social History Narrative    Not on file     Social Determinants of Health     Financial Resource Strain:     Difficulty of Paying Living Expenses: Not on file   Food Insecurity:     Worried About Running Out of Food in the Last Year: Not on file    Pat of Food in the Last Year: Not on file   Transportation Needs:     Lack of Transportation (Medical): Not on file    Lack of Transportation (Non-Medical):  Not on file   Physical Activity:     Days of Exercise per Week: Not on file    Minutes of Exercise per Session: Not on file   Stress:     Feeling of Stress : Not on file   Social Connections:     Frequency of Communication with Friends and Family: Not on file    Frequency of Social Gatherings with Friends and Family: Not on file    Attends [employer] Services: Not on file    Active Member of Clubs or Organizations: Not on file    Attends Club or Organization Meetings: Not on file    Marital Status: Not on file   Intimate Partner Violence:     Fear of Current or Ex-Partner: Not on file    Emotionally Abused: Not on file    Physically Abused: Not on file    Sexually Abused: Not on file   Housing Stability:     Unable to Pay for Housing in the Last Year: Not on file    Number of Places Lived in the Last Year: Not on file    Unstable Housing in the Last Year: Not on file     Family History:     Family History   Adopted: Yes   Problem Relation Age of Onset    No Known Problems Mother         Adopted    No Known Problems Father         Adopted      Allergies:   No known allergies     Review of Systems:   Unable to obtain patient is altered mental status    Physical Examination :   BP (!) 121/55   Pulse 75   Temp 97.6 °F (36.4 °C) (Temporal)   Resp 18   Ht 6' (1.829 m)   Wt (!) 360 lb (163.3 kg)   SpO2 98%   BMI 48.82 kg/m²     Temperature Range: Temp: 97.6 °F (36.4 °C) Temp  Av.6 °F (36.4 °C)  Min: 97.6 °F (36.4 °C)  Max: 97.6 °F (36.4 °C)  General Appearance: The patient is awake and on BiPAP but though he is alert he is not really answering any questions for me  Head: Normocephalic, without obvious abnormality, atraumatic  Eyes: Pupils equal, round, reactive, to light and accommodation; extraocular movements intact; sclera anicteric; conjunctivae pink  ENT: Oropharynx clear, without erythema, exudate, or thrush. Neck: Supple, without lymphadenopathy. Pulmonary/Chest: Clear to auscultation, without wheezes, rales, or rhonchi  Cardiovascular: Regular rate and rhythm without murmurs, rubs, or gallops. Abdomen: Obese abdomen mildly distended but soft nontender to palpation  Extremities: venous stasis dermatosis noted  Neurologic: He is awake and alert but does not answer any questions for me. Is on BiPAP at the time of my evaluation. Skin: Warm and dry with no rash, except for the a forementioned venous stasis dermatitis.     Medical Decision Making:   I have independently reviewed/ordered the following labs:  CBC with Differential:   Recent Labs     01/15/22  1335 22  0610   WBC 11.1 11.0   HGB 10.1* 10.4*   HCT 32.4* 33.5*    170   LYMPHOPCT 9*  -- MONOPCT 6  --      BMP:   Recent Labs     01/15/22  2345 01/16/22  0610   * 137   K 4.2 4.1   CL 98 100   CO2 24 24   BUN 46* 42*   CREATININE 5.22* 4.51*   MG 1.4* 1.8     Hepatic Function Panel:   Recent Labs     01/15/22  1335 01/15/22  1335 01/15/22  2345 01/16/22  0610   PROT 7.8  --   --  6.7   LABALBU 3.4*   < > 3.2* 3.0*   BILIDIR 0.17  --   --   --    IBILI 0.25  --   --   --    BILITOT 0.42  --   --  0.27*   ALKPHOS 163*  --   --  148*   ALT 12  --   --  11   AST 14  --   --  12    < > = values in this interval not displayed. Lab Results   Component Value Date    PROCAL 0.34 01/15/2022    PROCAL 0.42 01/15/2022    PROCAL 2.33 10/21/2021       Lab Results   Component Value Date    CRP 98.8 01/15/2022    CRP 42.3 11/06/2020    CRP 74.3 08/20/2017     Lab Results   Component Value Date    FERRITIN 160 01/15/2022    FERRITIN 109 05/07/2016     No results found for: FIBRINOGEN  Lab Results   Component Value Date    DDIMER 1.01 01/15/2022    DDIMER 2.49 05/04/2016     Lab Results   Component Value Date     01/15/2022       Lab Results   Component Value Date    SEDRATE 77 (H) 11/06/2020       Lab Results   Component Value Date    COVID19 Not Detected 01/15/2022    COVID19 Not Detected 11/04/2021    COVID19 Not Detected 10/21/2021     No results found for requested labs within last 30 days. Imaging Studies:   CT ABDOMEN PELVIS WO CONTRAST Additional Contrast? None    Result Date: 1/15/2022  EXAMINATION: CT OF THE ABDOMEN AND PELVIS WITHOUT CONTRAST 1/15/2022 3:25 pm TECHNIQUE: CT of the abdomen and pelvis was performed without the administration of intravenous contrast. Multiplanar reformatted images are provided for review. Dose modulation, iterative reconstruction, and/or weight based adjustment of the mA/kV was utilized to reduce the radiation dose to as low as reasonably achievable.  COMPARISON: 10/21/2021 HISTORY: ORDERING SYSTEM PROVIDED HISTORY: acute renal failure TECHNOLOGIST PROVIDED HISTORY: acute renal failure Decision Support Exception - unselect if not a suspected or confirmed emergency medical condition->Emergency Medical Condition (MA) Reason for Exam: AMS- acute renal failure FINDINGS: Lower Chest: Moderate to severe aortic valvular calcification. Organs: Cirrhotic liver morphology. Gallbladder is unremarkable without biliary ductal dilatation. Pancreas is unremarkable. Adrenals are unremarkable. Mild splenomegaly. Punctate right calyceal calculus. No hydronephrosis. Mild calcific atherosclerosis. GI/Bowel: Bowel is non-dilated without wall thickening. Appendix is normal. Pelvis: Unremarkable. Peritoneum/Retroperitoneum:No free fluid, free air, organized fluid collection or lymphadenopathy. Bones: Multilevel degenerative disc disease. 1. Right nonobstructive nephrolithiasis. 2. Cirrhotic liver morphology with sequela of portal hypertension. Consider nonemergent liver MRI with [facility] for [facility] screening. CT Head WO Contrast    Result Date: 1/15/2022  EXAMINATION: CT OF THE HEAD WITHOUT CONTRAST  1/15/2022 2:01 pm TECHNIQUE: CT of the head was performed without the administration of intravenous contrast. Dose modulation, iterative reconstruction, and/or weight based adjustment of the mA/kV was utilized to reduce the radiation dose to as low as reasonably achievable. COMPARISON: 21 October 2021 HISTORY: ORDERING SYSTEM PROVIDED HISTORY: ams TECHNOLOGIST PROVIDED HISTORY: ams Decision Support Exception - unselect if not a suspected or confirmed emergency medical condition->Emergency Medical Condition (MA) Reason for Exam: ams FINDINGS: BRAIN/VENTRICLES: There is no acute intracranial hemorrhage, mass effect or midline shift. No abnormal extra-axial fluid collection. The gray-white differentiation is maintained without evidence of an acute infarct. There is no evidence of hydrocephalus. Mild cortical atrophy is noted.   Calcification of the cavernous carotid arteries is noted. ORBITS: The visualized portion of the orbits demonstrate no acute abnormality. SINUSES: The visualized paranasal sinuses and mastoid air cells demonstrate no acute abnormality. SOFT TISSUES/SKULL:  No acute abnormality of the visualized skull or soft tissues. No acute intracranial abnormality. Senescent changes including mild cortical atrophy. NM LUNG SCAN PERFUSION ONLY    Result Date: 1/15/2022  EXAMINATION: NUCLEAR MEDICINE PERFUSION SCAN. 1/15/2022 TECHNIQUE: Ventilation not performed as part of COVID-19 safety precautions. 5.8 millicuries of Tc 49T MAA was administered intravenously prior to planar imaging of the lungs in multiple projections. COMPARISON: Chest radiograph 15 January 2022 at 1412 hours. HISTORY: ORDERING SYSTEM PROVIDED HISTORY: rule out PE TECHNOLOGIST PROVIDED HISTORY: rule out PE Decision Support Exception - unselect if not a suspected or confirmed emergency medical condition->Emergency Medical Condition (MA) Reason for Exam: rule out PE FINDINGS: PERFUSION: Distribution of radiotracer is homogeneous. No segmental defects identified. CHEST RADIOGRAPH: No focal areas of consolidation or significant effusions on recent chest radiograph. Lung volumes are low with probable minimal right basilar atelectasis and cardiomegaly. Low Probability for Pulmonary Embolus. XR CHEST PORTABLE    Result Date: 1/15/2022  EXAMINATION: ONE XRAY VIEW OF THE CHEST 1/15/2022 2:22 pm COMPARISON: The previous study performed 11/02/2021. HISTORY: ORDERING SYSTEM PROVIDED HISTORY: ams, covid pending TECHNOLOGIST PROVIDED HISTORY: ams, covid pending Reason for Exam: Port upright AP CXR - ams FINDINGS: The lungs are hypoinflated. There is again elevation of the right hemidiaphragm. Cardiomegaly is again seen. There is no evidence of acute consolidation or infiltrate. Linear opacities are identified at the right lung base, probably representing atelectasis.   The mediastinal structures are unremarkable. The tracheobronchial tree is midline and patent. The visualized bony thorax is unremarkable for the patient's age. Hypoinflated lungs. Cardiomegaly again seen, when compared to the previous study performed 11/02/2021. No acute consolidation or infiltrate. Probable right lung base atelectasis. Cultures:     Culture, Blood 1 [[id number]] Collected: 01/15/22 1700   Order Status: Completed Specimen: Blood Updated: 01/16/22 1011    Specimen Description . BLOOD    Special Requests L HAND 10ML    Culture NO GROWTH 12 HOURS   Culture, Blood 2 [[id number]] Collected: 01/15/22 1652   Order Status: Completed Specimen: Blood Updated: 01/16/22 1009    Specimen Description . BLOOD    Special Requests RAC 12ML    Culture NO GROWTH 12 HOURS   Culture, Urine [[id number]] Collected: 01/15/22 1455   Order Status: Sent Specimen: Urine, clean catch Updated: 01/15/22 1956   COVID-19, Rapid [[id number]] Collected: 01/15/22 1335   Order Status: Completed Specimen: Nasopharyngeal Swab Updated: 01/15/22 1405    Specimen Description . NASOPHARYNGEAL SWAB    SARS-CoV-2, Rapid Not Detected    Comment:        Rapid NAAT:  The specimen is NEGATIVE for SARS-CoV-2, the novel coronavirus associated with   COVID-19.         The ID NOW COVID-19 assay is designed to detect the virus that causes COVID-19 in patients   with signs and symptoms of infection who are suspected of COVID-19. An individual without symptoms of COVID-19 and who is not shedding SARS-CoV-2 virus would   expect to have a negative (not detected) result in this assay. Negative results should be treated as presumptive and, if inconsistent with clinical signs   and symptoms or necessary for patient management,   should be tested with an alternative molecular assay.  Negative results do not preclude   SARS-CoV-2 infection and   should not be used as the sole basis for patient management decisions.         Fact sheet for Healthcare Providers: Jannette.vesta   Fact sheet for Patients: Jannette.es           Methodology: Isothermal Nucleic Acid Amplification              Thank you for allowing us to participate in the care of this patient. Please call with questions. Electronically signed by Sumi Louis MD on 1/16/2022 at 3:17 PM      [facility]  Sumi Louis MD  Perfect Serve messaging  OFFICE: [phone]      This note is created with the assistance of a speech recognition program.  While intending to generate a document that actually reflects the content of the visit, the document can still have some errors including those of syntax and sound a like substitutions which may escape proof reading. In such instances, actual meaning can be extrapolated by contextual diversion.

## 2022-01-16 NOTE — PROGRESS NOTES
Pt admitted to room 2031 from ER via bed with ER RN. Verbal report received from ER RN. Telemetry monitor applied. Oxygen at 4L NC.  VS as charted. No s/s of distress at this time. Pt oriented to unit, room, call light and bed controls. Patient very lethargic at this time. Informed pt of plan of care. Pt denies needs. Will monitor pt closely.

## 2022-01-16 NOTE — CONSULTS
Renal Consult Note    Patient :  Negin Roman; 61 y.o. MRN# [id number]  Location:  2031/2031-01  Attending:  Alberto Anderson DO  Admit Date:  1/15/2022   Hospital Day: 1    Reason for Consult:     Asked by Dr Alberto Anderson DO to see for LEONARDA/Elevated Creatinine. History Obtained From:     non-family caregiver - staff, electronic medical record    History of Present Illness:     Negin Roman; 61 y.o. male with past medical history as mentioned below. Known history of type 2 diabetes, morbid obesity, hypertension, sleep apnea on supplemental oxygen at home, chronic hypercapnia, alcoholic liver cirrhosis with portal hypertension. He has had multiple admissions in the last few months both here and in [place]. [facility] predominantly with acute kidney injury and predominantly related to depleted circulating volume leading to hypotension and ATN. His creatinine prior to October was in the 0.9-1.1 range. Subsequently he has developed an element of chronic kidney disease and his lowest creatinine has been 1.4-1.5 range. He also has obstructive uropathy and has a chronic indwelling Clemons as result of which she keeps developing recurrent UTIs. Previous work-ups have shown proteinuria of 0.3 on the UPC. Last ultrasound of the kidney was in 2016 showed kidney size to be down 11 cm. Previous serological  work-up including immunofixation MARISA hepatitis B and C's serology and complements have been unremarkable. His last admission was in November when he was found to have significant volume depletion and hypotension with creatinine as high as 4.8, with hydration creatinine came down to 1.8 and he was discharged. He resumed his Cozaar and Lasix after discharge. It seems he presented to the ER with a malfunctioning Clemons few days ago he was found to be hypotensive, Clemons was exchanged and he was discharged home creatinine at that time was 3.7.   He now presents to the hospital with complaints of lethargy fatigue mental status changes. When EMS arrived they found him to be appropriate but then he became less responsive and fell asleep. He was brought to the ER for evaluation. Found to be hypotensive with systolic pressure in the 66R. Urinalysis showed too numerous to count WBCs. Creatinine was 6.2. Since then has been placed on IV fluids urine output has improved creatinine has come down to 4.5. Diuretics and ACE inhibitor's are on hold. He continues to be lethargic and requiring pressor support. ABG showed a pH of 7.2 PCO2 72 bicarb 29 PO2 88 and saturation of 94%. Nephrology consulted for acute kidney injury. No history of recent contrast exposure, No h/o prolonged NSAIDs use in the past, No h/o nephrolithiasis, No recent skin rashes or arthralgias, No hematuria or pyuria noticed in the recent past. Doesn't report any reduction in the urine output recently. Non report of any obstructive urinary symptoms (urgency, frequency, weak stream, straining while urination). No h/o recurrent UTIs in the past.    Past History/Allergies? Social History:     Past Medical History:   Diagnosis Date    Anxiety and depression     Back pain, chronic     BPH (benign prostatic hyperplasia)     Cellulitis of left lower extremity 8/21/2017    Cellulitis of right lower extremity 8/19/2017    CHF (congestive heart failure) (HCC)     Cirrhosis ([address]     Diabetes mellitus ([address]     not checking bloodsugar at home    Epididymoorchitis     GERD (gastroesophageal reflux disease)     H/O cardiac catheterization not sure of date    no stents    Hepatitis     Pt does not know the type.      Hiatal hernia     History of alcohol abuse     Sober since 2013    Hyperlipidemia     not taking any medication    Hypertension     IBS (irritable bowel syndrome)     Incisional hernia with obstruction but no gangrene 5/20/2018    Klebsiella sepsis ([address] 41/67/7045    Metabolic encephalopathy     Morbid obesity (HCC)     Neuropathy feet,toes    On home oxygen therapy     DME for home oxgygen at 2.5 lpm at HS and as needed    On home oxygen therapy     pt uses 2-3L NC @ home    Pancreatitis     x 5 episodes    Poisoning by insulin and oral hypoglycemic (antidiabetic) drugs, accidental (unintentional), initial encounter 1/12/2021    Primary osteoarthritis of right knee     Retention, urine 1/24/2018    SBO (small bowel obstruction) ([facility] 5/19/2018    Septic shock ([facility]     Sleep apnea     suspected juany, never has had PSG study. HAS NO MACHINE    Sleep apnea, obstructive     pt noncompliant w/ CPAP @ home.  Thyroid disease     Urinary bladder neurogenic dysfunction     Urinary tract infection associated with indwelling urethral catheter ([facility] 11/4/2020       Allergies   Allergen Reactions    No Known Allergies        Social History     Socioeconomic History    Marital status:      Spouse name: Not on file    Number of children: Not on file    Years of education: Not on file    Highest education level: Not on file   Occupational History    Not on file   Tobacco Use    Smoking status: Never Smoker    Smokeless tobacco: Never Used   Vaping Use    Vaping Use: Never used   Substance and Sexual Activity    Alcohol use: No     Comment: quit drinking 2012    Drug use: No    Sexual activity: Not on file   Other Topics Concern    Not on file   Social History Narrative    Not on file     Social Determinants of Health     Financial Resource Strain:     Difficulty of Paying Living Expenses: Not on file   Food Insecurity:     Worried About Running Out of Food in the Last Year: Not on file    Pat of Food in the Last Year: Not on file   Transportation Needs:     Lack of Transportation (Medical): Not on file    Lack of Transportation (Non-Medical):  Not on file   Physical Activity:     Days of Exercise per Week: Not on file    Minutes of Exercise per Session: Not on file   Stress:     Feeling of Stress : Not on file   Social Connections:     Frequency of Communication with Friends and Family: Not on file    Frequency of Social Gatherings with Friends and Family: Not on file    Attends Synagogue Services: Not on file    Active Member of Clubs or Organizations: Not on file    Attends Club or Organization Meetings: Not on file    Marital Status: Not on file   Intimate Partner Violence:     Fear of Current or Ex-Partner: Not on file    Emotionally Abused: Not on file    Physically Abused: Not on file    Sexually Abused: Not on file   Housing Stability:     Unable to Pay for Housing in the Last Year: Not on file    Number of [place] in the Last Year: Not on file    Unstable Housing in the Last Year: Not on file       Family History:        Family History   Adopted: Yes   Problem Relation Age of Onset    No Known Problems Mother         Adopted    No Known Problems Father         Adopted       Outpatient Medications:     Medications Prior to Admission: clonazePAM (KLONOPIN) 0.5 MG tablet, Take 1 tablet by mouth 2 times daily for 3 days. diphenoxylate-atropine (LOMOTIL) 2.5-0.025 MG per tablet, Take 1 tablet by mouth 4 times daily as needed for Diarrhea. albuterol sulfate HFA (PROAIR HFA) 108 (90 Base) MCG/ACT inhaler, Inhale 2 puffs into the lungs every 6 hours as needed for Wheezing  DULoxetine (CYMBALTA) 60 MG capsule, Take 60 mg by mouth every morning   clonazePAM (KLONOPIN) 0.5 MG tablet, Take 1 tablet by mouth 2 times daily for 3 days. furosemide (LASIX) 40 MG tablet, Take 1 tablet by mouth daily  losartan (COZAAR) 50 MG tablet, Take 1 tablet by mouth daily  pregabalin (LYRICA) 150 MG capsule, Take 150 mg by mouth 2 times daily.   SITagliptin (JANUVIA) 100 MG tablet, Take 100 mg by mouth daily  nystatin (MYCOSTATIN) 439560 UNIT/GM cream, Apply topically 2 times daily as needed (to skin folds)  QUEtiapine (SEROQUEL XR) 50 MG extended release tablet, Take 50 mg by mouth nightly  triamcinolone (KENALOG) 0. 025 % cream, Apply topically 2 times daily as needed  fluocinonide (LIDEX) 0.05 % external solution, Apply topically as needed (scalp itching)  ketoconazole (NIZORAL) 2 % shampoo, Apply topically Twice a Week  tamsulosin (FLOMAX) 0.4 MG capsule, Take 1 capsule by mouth daily  oxybutynin (DITROPAN) 5 MG tablet, Take 1 tablet by mouth 2 times daily  aspirin 81 MG EC tablet, Take 1 tablet by mouth daily  levothyroxine (SYNTHROID) 175 MCG tablet, Take 175 mcg by mouth Daily   nystatin (MYCOSTATIN) 389610 UNIT/GM powder, Apply topically 2 times daily as needed TO ABDOMINAL FOLDS, GROIN   esomeprazole (NEXIUM) 40 MG capsule, Take 40 mg by mouth 2 times daily     Current Medications:     Scheduled Meds:    midodrine  10 mg Oral TID    sodium chloride  1,000 mL IntraVENous Once    aspirin  81 mg Oral Daily    DULoxetine  60 mg Oral QAM    pantoprazole  40 mg Oral QAM AC    levothyroxine  175 mcg Oral Daily    oxybutynin  5 mg Oral BID    tamsulosin  0.4 mg Oral Daily    sodium chloride flush  5-40 mL IntraVENous 2 times per day    heparin (porcine)  5,000 Units SubCUTAneous 3 times per day    insulin lispro  0-6 Units SubCUTAneous TID WC    insulin lispro  0-3 Units SubCUTAneous Nightly    meropenem  500 mg IntraVENous Q12H     Continuous Infusions:    sodium chloride      sodium chloride      dextrose      norepinephrine 10 mcg/min (01/16/22 0632)    dextrose 5 % and 0.9 % NaCl 75 mL/hr at 01/15/22 2305     PRN Meds:  sodium chloride, albuterol sulfate HFA, sodium chloride flush, sodium chloride, ondansetron **OR** ondansetron, acetaminophen **OR** acetaminophen, glucose, dextrose, glucagon (rDNA), dextrose    Review of Systems:     Constitutional: No fever, no chills, positive lethargy lethargy, + weakness. HEENT:  No headache, otalgia, itchy eyes, nasal discharge or sore throat. Cardiac:  No chest pain, dyspnea, orthopnea or PND. Chest:  No cough, phlegm or wheezing.   Abdomen:  No abdominal pain, nausea or vomiting. Neuro:  No focal weakness, abnormal movements orseizure like activity. Skin:   No rashes, no itching. :   No hematuria, no pyuria, no dysuria, no flank pain. Extremities:  No swelling or joint pains. ROS was otherwise negative except as mentioned in the [address] Input/Output:       I/O last 3 completed shifts: In: 3197.3 [I.V.:3047.3; IV Piggyback:150]  Out: 2611.5 [Urine:2611.5]  Patient Vitals for the past 96 hrs (Last 3 readings):   Weight   01/15/22 1341 (!) 360 lb (163.3 kg)     Vital Signs:   Temperature:  Temp: 97.2 °F (36.2 °C)  TMax:   Temp (24hrs), Av.4 °F (36.3 °C), Min:97.2 °F (36.2 °C), Max:97.6 °F (36.4 °C)    Respirations:  Resp: 18  Pulse:   Pulse: 75  BP:    BP: (!) 121/55  BP Range: Systolic (01APU), OBA:521 , Min:50 , GNB:330       Diastolic (48KYC), TYV:31, Min:35, Max:124      Physical Examination:     General:  Awake, following commands, BiPAP in place, no accessory muscle use. HEENT: Atraumatic, normocephalic, no throat congestion, moist mucosa. Eyes:   Pupils equal, round and reactive to light, EOMI. Neck:   No JVD, no thyromegaly, no lymphadenopathy. Chest:  Bilateral vesicular breath sounds, no rales or wheezes. Cardiac:  S1 S2 RR, no murmurs, gallops or rubs, JVP not raised. Abdomen: Soft, non-tender, no masses or organomegaly, BS audible. :   No suprapubic or flank tenderness. Neuro:  Lethargic but arousable no obvious deficits. SKIN:  No rashes, good skin turgor. Extremities:  No edema, palpable peripheral pulses, no calf tenderness.     Labs:       Recent Labs     01/15/22  1335 22  0610   WBC 11.1 11.0   RBC 3.76* 3.86*   HGB 10.1* 10.4*   HCT 32.4* 33.5*   MCV 86.2 86.8   MCH 26.9 26.9   MCHC 31.2 31.0   RDW 16.2* 16.5*    170   MPV 10.9 10.1      BMP:   Recent Labs     01/15/22  1335 01/15/22  1340 01/15/22  2345 22  0117 22  0610   *  --  134*  --  137   K 4.4  --  4.2  --  4.1   CL 90*  --  98  --  100   CO2 23  --  24  --  24   BUN 46*  --  46*  --  42*   CREATININE 6.21*  --  5.22*  --  4.51*   GLUCOSE 116*   < > 105* 92 173*   CALCIUM 8.9  --  7.8*  --  8.1*    < > = values in this interval not displayed.       Phosphorus:     Recent Labs     01/15/22  2345   PHOS 5.6*     Magnesium:    Recent Labs     01/15/22  1724 01/15/22  2345 01/16/22  0610   MG 1.2* 1.4* 1.8     Albumin:    Recent Labs     01/15/22  1335 01/15/22  2345 01/16/22  0610   LABALBU 3.4* 3.2* 3.0*     BNP:      Lab Results   Component Value Date    BNP NOT REPORTED 06/18/2014     MARISA:      Lab Results   Component Value Date    MARISA NEGATIVE 11/03/2021     SPEP:  Lab Results   Component Value Date    PROT 6.7 01/16/2022    ALBCAL 3.3 11/03/2021    ALBPCT 51 11/03/2021    LABALPH 0.2 11/03/2021    LABALPH 0.6 11/03/2021    A1PCT 3 11/03/2021    A2PCT 9 11/03/2021    LABBETA 1.0 11/03/2021    BETAPCT 15 11/03/2021    GAMGLOB 1.5 11/03/2021    GGPCT 23 11/03/2021    PATH PENDING 01/15/2022     UPEP:     Lab Results   Component Value Date    LABPE PENDING 01/15/2022     C3:     Lab Results   Component Value Date    C3 94 11/03/2021     C4:     Lab Results   Component Value Date    C4 21 11/03/2021     MPO ANCA:   No results found for: MPO  PR3 ANCA:   No results found for: PR3  Anti-GBM:   No results found for: GBMABIGG  Hep BsAg:         Lab Results   Component Value Date    HEPBSAG NONREACTIVE 06/19/2014     Hep C AB:          Lab Results   Component Value Date    HEPCAB NONREACTIVE 06/19/2014       Urinalysis/Chemistries:      Lab Results   Component Value Date    NITRU POSITIVE 01/15/2022    COLORU PADMA 01/15/2022    PHUR 5.0 01/15/2022    WBCUA TOO NUMEROUS TO COUNT 01/15/2022    RBCUA TOO NUMEROUS TO COUNT 01/15/2022    MUCUS NOT REPORTED 01/15/2022    TRICHOMONAS NOT REPORTED 01/15/2022    YEAST NOT REPORTED 01/15/2022    BACTERIA MANY 01/15/2022    CLARITYU slightly cloudy 05/04/2016    SPECGRAV 1.037 01/15/2022    LEUKOCYTESUR MOD 01/15/2022 UROBILINOGEN Normal 01/15/2022    BILIRUBINUR  01/15/2022     Presumptive positive. Unable to confirm due to unavailability of reagent. BILIRUBINUR neg 05/04/2016    BILIRUBINUR 3+ 08/30/2011    BLOODU small 05/04/2016    GLUCOSEU NEGATIVE 01/15/2022    GLUCOSEU 1+ 08/30/2011    KETUA 1+ 01/15/2022    AMORPHOUS NOT REPORTED 01/15/2022     Urine Sodium:     Lab Results   Component Value Date    TICO <20 01/15/2022     Urine Potassium:    Lab Results   Component Value Date    KUR 10.3 11/03/2021     Urine Chloride:    Lab Results   Component Value Date    CLUR <20 01/15/2022     Urine Osmolarity:   Lab Results   Component Value Date    OSMOU 302 01/15/2022     Urine Protein:     Lab Results   Component Value Date     01/15/2022     Urine Creatinine:     Lab Results   Component Value Date    LABCREA 94.2 11/03/2021     UPC:     Urine Eosinophils:  No components found for: UEOS    Radiology:     CXR:     Assessment:     1. Acute Kidney Injury: Secondary to ischemic ATN from hypotension low flow related intravascular volume depletion, secondary to loop diuretics and ARB use additional abdomen from underlying systemic inflammation response in room related to complicated UTI. Baseline creatinine prior to October 21 was 0.9-1.1 range since then has been in the 1.4-1.5 range which is now up to 6.2. Has had multiple episodes of acute kidney injury in the last few months requiring hospitalization. 2. shock secondary to intravascular volume depletion, his weight today is 163 kg weight last admission was 171, he clinically appears to be dry. Sepsis/systemic inflammatory response syndrome need to be considered as well as patient has a chronic indwelling Clemons and pyuria  3. 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 morbid obesity  5. Sleep apnea with hypercapnia  6. Respiratory failure  7. Type 2 diabetes with complications  8. Alcoholic liver cirrhosis with portal hypertension    Plan:   1. continue D5/0.9 at 75 mill an hour  2. continue to monitor urine output  3. wean off pressors  4. start ProAmatine 10 3 times a day  5. check ultrasound scan of the kidney  6. strict intake output and daily weights  7. Avoid nephrotoxic agents  8. We will follow-up with you. Nutrition   Please ensure that patient is on a renal diet/TF. Avoid nephrotoxic drugs/contrast exposure. Thank you for the consultation. Please do not hesitate to contact us for any further questions/concerns. We will continue to follow along with you.

## 2022-01-16 NOTE — PROGRESS NOTES
[facility]  Office: [address], DO, Obdulia Burt, DO, Antonio Ji, DO, Chidi Thomasashley Anderson, DO, Derek Higuera MD, Jacinto Palma MD, Naida Jefferson MD, Dale Croft MD, Lindsey Toro MD, Dana Shaw MD, Yung Ponce MD, Barby Pardo, DO, Luc Moreno, DO, Garrison Marr MD,  Ochoa Arechiga, DO, Sara Enrique MD, Enio Sanford MD, Lena Wong MD, Cheyenne Eric MD, Shanti Mercado MD, Aisha Chance MD, Javi Bueno MD, Chinedu Cooper, Lashonda Mark, CNP, Ashtyn Pereira, CNP, Yung Li, CNS, Nilsa Almonte, CNP, Gil Lowe, CNP, Mo Moore, CNP, Minerva Escalante, CNP, Araceli Domínguez, CNP, Peg Velarde PA-C, Penny Casillas, DNP, Maggy Warren, NICKOLAS, Troy Hernandez, CNP, Tee Teran, CNP, Pato Sarabia, CNP, Judy Morgan, CNP, Shirin Metzger, [facility], Chaka Marshall, [facility]    Progress Note    1/16/2022    8:20 AM    Name:   Evangelist Ludwig  MRN:     [id number]     Acct:      [de-identified]   Room:   2031/2031-01   Day:  1  Admit Date:  1/15/2022  1:31 PM    PCP:   Irene Sanabria MD  Code Status:  Full Code    Subjective:     C/C:   Chief Complaint   Patient presents with    Altered Mental Status     Interval History Status: not changed. Confused, unable to answer questions for me  Just mumbles    Brief History:     Per my MARK:  \"Ruben Real is a 61 y.o. Non- / non  male who presents with Altered Mental Status   and is admitted to the hospital for the management of Sepsis ([facility] [address] [facility]      Per the ER note Patient is a 53-year-old male with a history of CHF and COPD who presented to the emergency department by EMS from home secondary to altered mental status.  History as per EMS who stated sinus pain and altered throughout the day no history of trauma or fall however when EMS arrived patient was alert answering questions appropriately and then fell asleep.  Patient stated he is on oxygen but is not used at all times.  States that he is vaccinated; no known COVID exposure.  Denied any trauma or injury.  Patient denied difficulty speaking or weakness.  Patient denied chest pain, shortness of breath, nausea, vomiting, fevers or chills     Patient was seen in our ER on 1/12/2022 related to dysuria. According to the notes his Clemons catheter was changed out. They document no urinary retention after replacement of Clemons. At that visit his BUN and creatinine were 27/3. 69. The ER note from the 12th also notes a blood pressure of 85/50 with a pulse of 66. Patient told the ER staff that he has been running low. Patient was discharged. Today the patient's bun/creatinine is 46/ 6.21. On review of his chart he was hospitalized in November 2021 with a creatinine of 4.62 that trended down to 1.8 after receiving fluids. October 2021 the patient was admitted with pneumonia. According to the notes the patient developed LEONARDA from overdiuresis, ischemic ATN secondary to hypotension. At that time his creatinine bumped to 2.72 with fluids recovered to 1.48. \"    Review of Systems:     unobtainable      Medications: Allergies:     Allergies   Allergen Reactions    No Known Allergies        Current Meds:   Scheduled Meds:    sodium chloride  1,000 mL IntraVENous Once    aspirin  81 mg Oral Daily    DULoxetine  60 mg Oral QAM    pantoprazole  40 mg Oral QAM AC    levothyroxine  175 mcg Oral Daily    oxybutynin  5 mg Oral BID    tamsulosin  0.4 mg Oral Daily    sodium chloride flush  5-40 mL IntraVENous 2 times per day    heparin (porcine)  5,000 Units SubCUTAneous 3 times per day    insulin lispro  0-6 Units SubCUTAneous TID     insulin lispro  0-3 Units SubCUTAneous Nightly    meropenem  500 mg IntraVENous Q12H     Continuous Infusions:    sodium chloride      sodium chloride      dextrose      norepinephrine 10 mcg/min (01/16/22 3432)    dextrose 5 % and 0.9 % NaCl 75 mL/hr at 01/15/22 4678 PRN Meds: sodium chloride, albuterol sulfate HFA, sodium chloride flush, sodium chloride, ondansetron **OR** ondansetron, acetaminophen **OR** acetaminophen, glucose, dextrose, glucagon (rDNA), dextrose    Data:     Past Medical History:   has a past medical history of Anxiety and depression, Back pain, chronic, BPH (benign prostatic hyperplasia), Cellulitis of left lower extremity, Cellulitis of right lower extremity, CHF (congestive heart failure) ([facility] [facility] Cirrhosis ([facility] [address] [facility] Diabetes mellitus ([facility] [facility] Epididymoorchitis, GERD (gastroesophageal reflux disease), H/O cardiac catheterization, Hepatitis, Hiatal hernia, History of alcohol abuse, Hyperlipidemia, Hypertension, IBS (irritable bowel syndrome), Incisional hernia with obstruction but no gangrene, Klebsiella sepsis ([facility] [address] [facility] Metabolic encephalopathy, Morbid obesity ([facility] [address] [facility] Neuropathy, On home oxygen therapy, On home oxygen therapy, Pancreatitis, Poisoning by insulin and oral hypoglycemic (antidiabetic) drugs, accidental (unintentional), initial encounter, Primary osteoarthritis of right knee, Retention, urine, SBO (small bowel obstruction) ([facility] [facility] Septic shock ([facility] [address] [facility] Sleep apnea, Sleep apnea, obstructive, Thyroid disease, Urinary bladder neurogenic dysfunction, and Urinary tract infection associated with indwelling urethral catheter ([facility] [facility] Social History:   reports that he has never smoked. He has never used smokeless tobacco. He reports that he does not drink alcohol and does not use drugs.      Family History:   Family History   Adopted: Yes   Problem Relation Age of Onset    No Known Problems Mother         Adopted    No Known Problems Father         Adopted       Vitals:  /60   Pulse 69   Temp 97.6 °F (36.4 °C) (Temporal)   Resp 16   Ht 6' (1.829 m)   Wt (!) 360 lb (163.3 kg)   SpO2 99%   BMI 48.82 kg/m²   Temp (24hrs), Av.8 °F (36.6 °C), Min:97.6 °F (36.4 °C), Max:98 °F (36.7 °C)    Recent Labs     01/15/22  2344 22  0115 22  0606 01/16/22  0749   POCGLU 93 92 162* 132*       I/O (24Hr): Intake/Output Summary (Last 24 hours) at 1/16/2022 0820  Last data filed at 1/16/2022 0626  Gross per 24 hour   Intake 3197.3 ml   Output 2611.5 ml   Net 585.8 ml       Labs:  Hematology:  Recent Labs     01/15/22  1335 01/16/22  0610   WBC 11.1 11.0   RBC 3.76* 3.86*   HGB 10.1* 10.4*   HCT 32.4* 33.5*   MCV 86.2 86.8   MCH 26.9 26.9   MCHC 31.2 31.0   RDW 16.2* 16.5*    170   MPV 10.9 10.1   CRP 98.8*  --    INR  --  0.9   DDIMER 1.01*  --      Chemistry:  Recent Labs     01/15/22  1335 01/15/22  1340 01/15/22  1724 01/15/22  2345 01/16/22  0117 01/16/22  0610   *  --   --  134*  --  137   K 4.4  --   --  4.2  --  4.1   CL 90*  --   --  98  --  100   CO2 23  --   --  24  --  24   GLUCOSE 116*   < >  --  105* 92 173*   BUN 46*  --   --  46*  --  42*   CREATININE 6.21*  --   --  5.22*  --  4.51*   MG  --   --  1.2* 1.4*  --  1.8   ANIONGAP 20*  --   --  12  --  13   LABGLOM 9*  --   --  11*  --  14*   GFRAA 11*  --   --  14*  --  16*   CALCIUM 8.9  --   --  7.8*  --  8.1*   PHOS  --   --   --  5.6*  --   --    PROBNP 1,574*  --   --   --   --   --    TROPHS 53*  --   --   --   --   --     < > = values in this interval not displayed.      Recent Labs     01/15/22  1335 01/15/22  1340 01/15/22  1345 01/15/22  1803 01/15/22  2122 01/15/22  2204 01/15/22  2344 01/15/22  2345 01/16/22  0115 01/16/22  0606 01/16/22  0610 01/16/22  0749   PROT 7.8  --   --   --   --   --   --   --   --   --  6.7  --    LABALBU 3.4*  --   --   --   --   --   --  3.2*  --   --  3.0*  --    LABA1C 6.3*  --   --   --   --   --   --   --   --   --   --   --    TSH  --   --   --   --   --   --   --  1.95  --   --   --   --    AST 14  --   --   --   --   --   --   --   --   --  12  --    ALT 12  --   --   --   --   --   --   --   --   --  11  --    *  --   --   --   --   --   --   --   --   --   --   --    ALKPHOS 163*  --   --   --   --   --   --   --   --   --  148* --    BILITOT 0.42  --   --   --   --   --   --   --   --   --  0.27*  --    BILIDIR 0.17  --   --   --   --   --   --   --   --   --   --   --    AMMONIA  --   --  <10*  --   --   --   --   --   --   --   --   --    POCGLU  --    < >  --    < > 63* 76 93  --  92 162*  --  132*    < > = values in this interval not displayed. ABG:  Lab Results   Component Value Date    POCPH 7.223 01/16/2022    PHART 7.330 06/18/2014    POCPCO2 72.2 01/16/2022    ZNO3QLK 68.0 06/18/2014    POCPO2 88.9 01/16/2022    PO2ART 84.0 06/18/2014    POCHCO3 29.7 01/16/2022    IGJ2MMX 34.9 06/18/2014    NBEA NOT REPORTED 01/16/2022    PBEA 1 01/16/2022    UTS5HPM NOT REPORTED 01/16/2022    NDLB3QUT 94 01/16/2022    O6NESVXR 96.5 06/18/2014    FIO2 NOT REPORTED 01/16/2022     Lab Results   Component Value Date/Time    SPECIAL L HAND 10ML 01/15/2022 05:00 PM     Lab Results   Component Value Date/Time    CULTURE NO GROWTH <24 HRS 01/15/2022 05:00 PM       Radiology:  CT ABDOMEN PELVIS WO CONTRAST Additional Contrast? None    Result Date: 1/15/2022  1. Right nonobstructive nephrolithiasis. 2. Cirrhotic liver morphology with sequela of portal hypertension. Consider nonemergent liver [facility]. CT Head WO Contrast    Result Date: 1/15/2022  No acute intracranial abnormality. Senescent changes including mild cortical atrophy. NM LUNG SCAN PERFUSION ONLY    Result Date: 1/15/2022  Low Probability for Pulmonary Embolus. XR CHEST PORTABLE    Result Date: 1/15/2022  Hypoinflated lungs. Cardiomegaly again seen, when compared to the previous study performed 11/02/2021. No acute consolidation or infiltrate. Probable right lung base atelectasis. Echo 10/25/21:  CONCLUSIONS     Summary  Left ventricle appears normal in size. Mild to moderately increased left ventricular wall thickness, with  asymmetrical thickening of the septum. Left ventricular systolic function appears overall preserved.   Estimated EF 50%.  Unable to assess for wall motion abnormalities within the limits of the  study. Left atrial dilatation. Aortic valve is difficult to assess within the limitations of the study. Aortic valve right and left coronary cusps are sclerotic and appears to be  possible fused, cannot rule out Bicuspid AV. Unable to assess adequately for aortic stenosis, but does appear to be at  least mild aortic stenosis. Mitral valve sclerosis without stenosis.       Physical Examination:        General appearance:  Lethargic, no distress  Mental Status:  confused  Lungs:  clear to auscultation bilaterally, normal effort  Heart:  regular rate and rhythm, positive aortic murmur  Abdomen:  soft, nontender, nondistended, normal bowel sounds, no masses, hepatomegaly, splenomegaly; obese  Extremities:  no redness, tenderness in the calves; chronic lymphedema  Skin:  Venous stasis changes    Assessment:        Hospital Problems           Last Modified POA    * (Principal) Sepsis with acute hypercapnic respiratory failure and septic shock ([address] 1/02/3351 Yes    Alcoholic cirrhosis of liver ([address] sober since 2013 1/15/2022 Yes    Bipolar disorder ([address] 1/15/2022 Yes    Type 2 diabetes mellitus with diabetic neuropathy, without long-term current use of insulin ([address] 1/15/2022 Yes    Essential hypertension, currently hypotensive 1/15/2022 Yes    FABI (obstructive sleep apnea) 1/15/2022 Yes    Type 2 diabetes mellitus with diabetic neuropathy (HCC) (Chronic) 1/15/2022 Yes    Acquired hypothyroidism 1/15/2022 Yes    Venous stasis dermatitis of both lower extremities (Chronic) 1/15/2022 Yes    Chronic indwelling Clemons catheter (Chronic) 1/15/2022 Yes    BPH (benign prostatic hyperplasia) 1/15/2022 Yes    Chronic diastolic congestive heart failure ([address] 1/16/2022 Yes    On home oxygen therapy 1/15/2022 Yes    Overview Signed 11/4/2020  2:08 PM by SUNSHINE Chavez NP     prn         Urinary bladder neurogenic dysfunction 1/15/2022 Yes Urinary tract infection associated with indwelling urethral catheter ([facility] [address] 1/15/2022 Yes    Acute kidney injury superimposed on chronic kidney disease ([facility] [address] 1/15/2022 Yes    Somnolence 1/15/2022 Yes    Acute respiratory acidosis 1/16/2022 Yes          Plan:        1. Stat abg  2. bipap as needed; pulm consult for acute on chr resp acidosis-most likely from juany  3. Cont antibiotics; ID eval; awaiting cultures-but if grows same as in October, will be able to streamline antibiotics to cipro  4. Wean levophed as able  5. old echo reviewed  6.  Guarded prognosis    Paige Walker Blood, DO  1/16/2022  8:20 AM

## 2022-01-16 NOTE — PROGRESS NOTES
[facility]  Office: [address], DO, Yanet Carson, DO, Braxton Lozano, DO, Yessicachema Tan Blood, DO, Shay Valdez MD, Pedro Cobb MD, Maite Shah MD, Celeste Quinones MD, J Luis Del Angel MD, Balaji Blum MD, Melvin Robledo MD, Anita Haynes, DO, Antony Kyle, DO, Blair Dorado MD,  Deysi Mckeon, DO, Sharon Bejarano MD, Danii Marina MD, Angeles Philip MD, An Singletary MD, Yayo Herrera MD, Zuleyka Whalen MD, Lenin Kong MD, Shivani Myers, [facility], [facility], CNP, Allan Espinosa, CNP, Abad Milligan, CNS, Aydee Daniele, CNP, Misa Mathew, CNP, Haveginette Shells, CNP, Cuba Henry, CNP, Jaymie Cazares, CNP, Milissa Pallas, PA-C, Trudi Soulier, DNP, Leonie Odell, DNP, Paz Shah, CNP, Ra Roberson, CNP, Frankey Blush, CNP, Jared Escobedo, CNP, Aracelis Sanabria, CNP, Janett Huff. [address]   Nighttime In-House Nurse Practitioner      1/15/2022    9:28 PM    Name:   Aixa Mtz  MRN:     [id number]     Acct:      [de-identified]   Room:   [address] Day:  0  Admit Date:  1/15/2022  1:31 PM    PCP:   Thais Tirado MD  Code Status:  Prior    Called to the floor by staff to evaluate Aixa Mtz in [place] [address] at 9:28 PM with complaint of persistent hypotension despite fluid boluses. Patient remains in ED at this time, awaiting bed assignment/ availability. He is drowsy, but arousable. Nursing reports patient is hypoglycemic and is being treated per protocol. Patient has a history of sleep apnea and is O2 dependent. Assessment/Plan:     Hypotension: Start low dose levophed to keep MAP > 65. S/W Dr Saurabh Church and requested central line be placed since patient now needs pressor and is being admitted the ICU. Dr Saurabh Church agrees to place line. Continue to hold Trazodone, klonopin. Hypoglycemia: Change IV maintenance fluids to D5 NS. Monitor BG every 4 hours. Sleep apnea: CPAP overnight and prn for naps.      Additional medical information reviewed: labs, VS    Physical Examination:        General appearance:  drowsy,  no distress  Mental Status: oriented to self and place  Lungs:  clear to auscultation bilaterally upper lobes, diminished bilateral bases, snoring/ sleep apnea observed  Heart:  regular rate and rhythm, no murmur  Abdomen:  soft, nontender, nondistended, hypoactive bowel sounds  Extremities:  no edema, redness, tenderness in the calves  Skin:  no gross lesions, rashes, induration, dry, cool    Problem List:        Hospital Problems           Last Modified POA    * (Principal) Sepsis ([address] 0/53/1933 Yes    Alcoholic cirrhosis of liver ([address] sober since 2013 1/15/2022 Yes    Bipolar disorder ([address] 1/15/2022 Yes    Type 2 diabetes mellitus with diabetic neuropathy, without long-term current use of insulin ([address] 1/15/2022 Yes    Essential hypertension, currently hypotensive 1/15/2022 Yes    FABI (obstructive sleep apnea) 1/15/2022 Yes    Type 2 diabetes mellitus with diabetic neuropathy ([address] (Chronic) 1/15/2022 Yes    Acquired hypothyroidism 1/15/2022 Yes    Venous stasis dermatitis of both lower extremities (Chronic) 1/15/2022 Yes    Chronic indwelling Clemons catheter (Chronic) 1/15/2022 Yes    BPH (benign prostatic hyperplasia) 5/02/0610 Yes    Diastolic congestive heart failure ([address] 1/15/2022 Yes    On home oxygen therapy 1/15/2022 Yes    Overview Signed 11/4/2020  2:08 PM by SUNSHINE Cheema NP     prn         Urinary bladder neurogenic dysfunction 1/15/2022 Yes    Urinary tract infection associated with indwelling urethral catheter ([address] 1/15/2022 Yes    Acute kidney injury superimposed on chronic kidney disease ([address] 1/15/2022 Yes    Somnolence 1/15/2022 Yes          Medications: Allergies:     Allergies   Allergen Reactions    No Known Allergies        Current Meds:   Scheduled Meds:    sodium chloride  1,000 mL IntraVENous Once    0.9 % sodium chloride  2,899 mL IntraVENous Once    aspirin  81 mg Oral Daily    [START ON 1/16/2022] DULoxetine  60 mg Oral QAM    [START ON 1/16/2022] pantoprazole  40 mg Oral QAM AC    [START ON 1/16/2022] levothyroxine  175 mcg Oral Daily    oxybutynin  5 mg Oral BID    tamsulosin  0.4 mg Oral Daily    sodium chloride flush  5-40 mL IntraVENous 2 times per day    heparin (porcine)  5,000 Units SubCUTAneous 3 times per day    insulin lispro  0-6 Units SubCUTAneous TID WC    insulin lispro  0-3 Units SubCUTAneous Nightly    meropenem  500 mg IntraVENous Q12H     Continuous Infusions:    sodium chloride      sodium chloride      sodium chloride      sodium chloride      dextrose      norepinephrine       PRN Meds: sodium chloride, albuterol sulfate HFA, sodium chloride flush, sodium chloride, ondansetron **OR** ondansetron, acetaminophen **OR** acetaminophen, sodium polystyrene, sodium polystyrene, glucose, dextrose, glucagon (rDNA), dextrose    Data:     Past Medical History:   has a past medical history of Anxiety and depression, Back pain, chronic, BPH (benign prostatic hyperplasia), Cellulitis of left lower extremity, Cellulitis of right lower extremity, CHF (congestive heart failure) ([address] Cirrhosis ([address] Diabetes mellitus ([address] Epididymoorchitis, GERD (gastroesophageal reflux disease), H/O cardiac catheterization, Hepatitis, Hiatal hernia, History of alcohol abuse, Hyperlipidemia, Hypertension, IBS (irritable bowel syndrome), Incisional hernia with obstruction but no gangrene, Klebsiella sepsis ([address] Metabolic encephalopathy, Morbid obesity ([address] Neuropathy, On home oxygen therapy, Pancreatitis, Poisoning by insulin and oral hypoglycemic (antidiabetic) drugs, accidental (unintentional), initial encounter, Primary osteoarthritis of right knee, Retention, urine, SBO (small bowel obstruction) ([address] Septic shock ([address] Sleep apnea, Thyroid disease, Urinary bladder neurogenic dysfunction, and Urinary tract infection associated with indwelling urethral catheter ([address]     Vitals:  BP (!) 76/44 Pulse 77   Temp 98 °F (36.7 °C) (Oral)   Resp 18   Ht 6' (1.829 m)   Wt (!) 360 lb (163.3 kg)   SpO2 96%   BMI 48.82 kg/m²   Temp (24hrs), Av °F (36.7 °C), Min:98 °F (36.7 °C), Max:98 °F (36.7 °C)    Recent Labs     01/15/22  1340 01/15/22  1803 01/15/22  1951   POCGLU 74* 78 67*       I/O (24Hr): Intake/Output Summary (Last 24 hours) at 1/15/2022 2128  Last data filed at 1/15/2022 2113  Gross per 24 hour   Intake 102.38 ml   Output 111.5 ml   Net -9.12 ml       Labs:    [unfilled]    Lab Results   Component Value Date/Time    SPECIAL LAC, 10ML 2021 06:45 PM    SPECIAL RAC, 10ML 2021 06:45 PM     Lab Results   Component Value Date/Time    CULTURE NO GROWTH 6 DAYS 2021 06:45 PM    CULTURE NO GROWTH 6 DAYS 2021 06:45 PM       [unfilled]    Radiology:    CT ABDOMEN PELVIS WO CONTRAST Additional Contrast? None    Result Date: 1/15/2022  1. Right nonobstructive nephrolithiasis. 2. Cirrhotic liver morphology with sequela of portal hypertension. Consider nonemergent liver MRI with Eovist for [facility] screening. CT Head WO Contrast    Result Date: 1/15/2022  No acute intracranial abnormality. Senescent changes including mild cortical atrophy. NM LUNG SCAN PERFUSION ONLY    Result Date: 1/15/2022  Low Probability for Pulmonary Embolus. XR CHEST PORTABLE    Result Date: 1/15/2022  Hypoinflated lungs. Cardiomegaly again seen, when compared to the previous study performed 2021. No acute consolidation or infiltrate. Probable right lung base atelectasis.        Paz Shah, APRN - NP  1/15/2022  9:28 PM

## 2022-01-16 NOTE — CARE COORDINATION
Case Management Initial Discharge Plan  Chryl Docker,         Readmission Risk              Risk of Unplanned Readmission:  52             Phone call to son Lianne Cuellar  to discuss discharge plans. Information verified: address, contacts, phone number, , insurance Yes  PCP: Alan Kaufman MD  Date of last visit: ? Insurance Provider: Edita Rodriguez Medicaid    Discharge Planning  Current Residence:  Private home  Living Arrangements:  (S) [address] (pt lives with his Son Fouzia Macias))   Home has 1 stories/ramp in place  Support Systems:  (S) [address] [facility] Staff  Current Services PTA:  HHA twice a day in 4 hr shifts Agency: [facility]  Patient able to perform ADL's:Dependent  DME in home:  O2 concentrator with portability from [employer], wheelchair, Lift Chair, walker  DME used to aid ambulation prior to admission:   wheelchair  DME used during admission:  TBD    Potential Assistance Needed:  [address] (pt's son reports the pt was at [facility])    Pharmacy:    Potential Assistance Purchasing Medications:     Does patient want to participate in local refill/ meds to beds program?       Patient agreeable to home care: current with home care. Son can't remember name of agency  Freedom of choice provided:  no      Type of Home Care Services:     Patient expects to be discharged to:       Prior SNF/Rehab Placement and Facility: [facility]  Agreeable to SNF/Rehab: son is requesting SNF   Freedom of choice provided: yes   Evaluation: yes    Expected Discharge date: Follow Up Appointment: Best Day/ Time:      Transportation provider: non emergency  Transportation arrangements needed for discharge: Yes    Discharge Plan:   Phone call to pt;s son Lianne Cuellar to review plan of care. Pt confused and unable to answer questions.     Lianne Cuellar states pt has been home from [facility] for one week and has been to the emergency room twice. Cristhian Mcgrath has been living with him for 10 years providing assistance and states he can not do it anymore. His brother Margo has 5 kids and can't provide any assistance and supports whatever Cristhian Mcgrath decides. Cristhian Mcgrath would like referral to  for pt to return to [facility]. Cristhian Mcgrath works 5:30am to 2pm or later. Pt has a HHA from [facility] twice a day in 4 hrs shifts so pt will be alone for short periods. Cristhian Mcgrath says pt has home care coming but he does not know who as they just started. PLAN  Identify current home care agency. SW to f/u for SNF.           Electronically signed by Elvin Downey on 1/16/22 at 4:37 PM EST

## 2022-01-17 ENCOUNTER — APPOINTMENT (OUTPATIENT)
Dept: ULTRASOUND IMAGING | Age: 57
DRG: 698 | End: 2022-01-17
Payer: MEDICARE

## 2022-01-17 ENCOUNTER — APPOINTMENT (OUTPATIENT)
Dept: GENERAL RADIOLOGY | Age: 57
DRG: 698 | End: 2022-01-17
Payer: MEDICARE

## 2022-01-17 LAB
ALLEN TEST: ABNORMAL
ALLEN TEST: ABNORMAL
ANION GAP SERPL CALCULATED.3IONS-SCNC: 11 MMOL/L (ref 9–17)
ANION GAP SERPL CALCULATED.3IONS-SCNC: 8 MMOL/L (ref 9–17)
BNP INTERPRETATION: ABNORMAL
BUN BLDV-MCNC: 28 MG/DL (ref 6–20)
BUN BLDV-MCNC: 31 MG/DL (ref 6–20)
BUN/CREAT BLD: 16 (ref 9–20)
BUN/CREAT BLD: 18 (ref 9–20)
CALCIUM SERPL-MCNC: 9.2 MG/DL (ref 8.6–10.4)
CALCIUM SERPL-MCNC: 9.2 MG/DL (ref 8.6–10.4)
CHLORIDE BLD-SCNC: 106 MMOL/L (ref 98–107)
CHLORIDE BLD-SCNC: 108 MMOL/L (ref 98–107)
CO2: 25 MMOL/L (ref 20–31)
CO2: 26 MMOL/L (ref 20–31)
CREAT SERPL-MCNC: 1.56 MG/DL (ref 0.7–1.2)
CREAT SERPL-MCNC: 2 MG/DL (ref 0.7–1.2)
CULTURE: ABNORMAL
CULTURE: ABNORMAL
FIO2: 28
FIO2: ABNORMAL
GFR AFRICAN AMERICAN: 42 ML/MIN
GFR AFRICAN AMERICAN: 56 ML/MIN
GFR NON-AFRICAN AMERICAN: 35 ML/MIN
GFR NON-AFRICAN AMERICAN: 46 ML/MIN
GFR SERPL CREATININE-BSD FRML MDRD: ABNORMAL ML/MIN/{1.73_M2}
GLUCOSE BLD-MCNC: 106 MG/DL (ref 75–110)
GLUCOSE BLD-MCNC: 113 MG/DL (ref 75–110)
GLUCOSE BLD-MCNC: 116 MG/DL (ref 70–99)
GLUCOSE BLD-MCNC: 184 MG/DL (ref 75–110)
GLUCOSE BLD-MCNC: 197 MG/DL (ref 75–110)
GLUCOSE BLD-MCNC: 200 MG/DL (ref 75–110)
GLUCOSE BLD-MCNC: 204 MG/DL (ref 75–110)
GLUCOSE BLD-MCNC: 235 MG/DL (ref 70–99)
HCO3 VENOUS: 28 MMOL/L (ref 22–29)
HCT VFR BLD CALC: 34.5 % (ref 40.7–50.3)
HEMOGLOBIN: 10.7 G/DL (ref 13–17)
Lab: ABNORMAL
MCH RBC QN AUTO: 26.7 PG (ref 25.2–33.5)
MCHC RBC AUTO-ENTMCNC: 31 G/DL (ref 28.4–34.8)
MCV RBC AUTO: 86 FL (ref 82.6–102.9)
MODE: ABNORMAL
MODE: ABNORMAL
NEGATIVE BASE EXCESS, ART: ABNORMAL (ref 0–2)
NEGATIVE BASE EXCESS, VEN: ABNORMAL (ref 0–2)
NRBC AUTOMATED: 0 PER 100 WBC
O2 DEVICE/FLOW/%: ABNORMAL
O2 DEVICE/FLOW/%: ABNORMAL
O2 SAT, VEN: 93 % (ref 60–85)
P E INTERPRETATION, U: NORMAL
PATHOLOGIST: NORMAL
PATIENT TEMP: 37
PATIENT TEMP: ABNORMAL
PCO2, VEN: 46.8 MM HG (ref 41–51)
PDW BLD-RTO: 16.4 % (ref 11.8–14.4)
PH VENOUS: 7.38 (ref 7.32–7.43)
PLATELET # BLD: 157 K/UL (ref 138–453)
PMV BLD AUTO: 10.1 FL (ref 8.1–13.5)
PO2, VEN: 70.3 MM HG (ref 30–50)
POC HCO3: 28.6 MMOL/L (ref 21–28)
POC O2 SATURATION: 95 % (ref 94–98)
POC PCO2 TEMP: ABNORMAL MM HG
POC PCO2 TEMP: ABNORMAL MM HG
POC PCO2: 51.5 MM HG (ref 35–48)
POC PH TEMP: ABNORMAL
POC PH TEMP: ABNORMAL
POC PH: 7.35 (ref 7.35–7.45)
POC PO2 TEMP: ABNORMAL MM HG
POC PO2 TEMP: ABNORMAL MM HG
POC PO2: 82.2 MM HG (ref 83–108)
POC TCO2: 29 MMOL/L (ref 22–30)
POSITIVE BASE EXCESS, ART: 2 (ref 0–3)
POSITIVE BASE EXCESS, VEN: 2 (ref 0–3)
POTASSIUM SERPL-SCNC: 4.5 MMOL/L (ref 3.7–5.3)
POTASSIUM SERPL-SCNC: 4.6 MMOL/L (ref 3.7–5.3)
PRO-BNP: 2099 PG/ML
RBC # BLD: 4.01 M/UL (ref 4.21–5.77)
SAMPLE SITE: ABNORMAL
SAMPLE SITE: ABNORMAL
SODIUM BLD-SCNC: 140 MMOL/L (ref 135–144)
SODIUM BLD-SCNC: 144 MMOL/L (ref 135–144)
SPECIMEN DESCRIPTION: ABNORMAL
SPECIMEN TYPE: NORMAL
TCO2 (CALC), ART: ABNORMAL MMOL/L (ref 22–29)
TOTAL CO2, VENOUS: ABNORMAL MMOL/L (ref 23–30)
TROPONIN INTERP: ABNORMAL
TROPONIN T: ABNORMAL NG/ML
TROPONIN, HIGH SENSITIVITY: 23 NG/L (ref 0–22)
URINE IFX INTERP: NORMAL
URINE IFX SPECIMEN: NORMAL
URINE TOTAL PROTEIN: 258 MG/DL
URINE TOTAL PROTEIN: 258 MG/DL
VOLUME: NORMAL ML
WBC # BLD: 8 K/UL (ref 3.5–11.3)

## 2022-01-17 PROCEDURE — 82947 ASSAY GLUCOSE BLOOD QUANT: CPT

## 2022-01-17 PROCEDURE — 2500000003 HC RX 250 WO HCPCS: Performed by: INTERNAL MEDICINE

## 2022-01-17 PROCEDURE — 2580000003 HC RX 258: Performed by: NURSE PRACTITIONER

## 2022-01-17 PROCEDURE — 2580000003 HC RX 258: Performed by: INTERNAL MEDICINE

## 2022-01-17 PROCEDURE — 85027 COMPLETE CBC AUTOMATED: CPT

## 2022-01-17 PROCEDURE — 94761 N-INVAS EAR/PLS OXIMETRY MLT: CPT

## 2022-01-17 PROCEDURE — 82374 ASSAY BLOOD CARBON DIOXIDE: CPT

## 2022-01-17 PROCEDURE — 83880 ASSAY OF NATRIURETIC PEPTIDE: CPT

## 2022-01-17 PROCEDURE — 2700000000 HC OXYGEN THERAPY PER DAY

## 2022-01-17 PROCEDURE — 84484 ASSAY OF TROPONIN QUANT: CPT

## 2022-01-17 PROCEDURE — 99232 SBSQ HOSP IP/OBS MODERATE 35: CPT | Performed by: INTERNAL MEDICINE

## 2022-01-17 PROCEDURE — 6360000002 HC RX W HCPCS: Performed by: NURSE PRACTITIONER

## 2022-01-17 PROCEDURE — 6370000000 HC RX 637 (ALT 250 FOR IP): Performed by: INTERNAL MEDICINE

## 2022-01-17 PROCEDURE — 94660 CPAP INITIATION&MGMT: CPT

## 2022-01-17 PROCEDURE — 94640 AIRWAY INHALATION TREATMENT: CPT

## 2022-01-17 PROCEDURE — 71045 X-RAY EXAM CHEST 1 VIEW: CPT

## 2022-01-17 PROCEDURE — 80048 BASIC METABOLIC PNL TOTAL CA: CPT

## 2022-01-17 PROCEDURE — 6360000002 HC RX W HCPCS: Performed by: INTERNAL MEDICINE

## 2022-01-17 PROCEDURE — 76770 US EXAM ABDO BACK WALL COMP: CPT

## 2022-01-17 PROCEDURE — 2000000000 HC ICU R&B

## 2022-01-17 PROCEDURE — 6370000000 HC RX 637 (ALT 250 FOR IP): Performed by: NURSE PRACTITIONER

## 2022-01-17 PROCEDURE — 82803 BLOOD GASES ANY COMBINATION: CPT

## 2022-01-17 PROCEDURE — 36600 WITHDRAWAL OF ARTERIAL BLOOD: CPT

## 2022-01-17 PROCEDURE — 36415 COLL VENOUS BLD VENIPUNCTURE: CPT

## 2022-01-17 RX ORDER — CLONAZEPAM 1 MG/1
1 TABLET ORAL 2 TIMES DAILY
COMMUNITY

## 2022-01-17 RX ORDER — NADOLOL 40 MG/1
40 TABLET ORAL DAILY
COMMUNITY

## 2022-01-17 RX ORDER — POTASSIUM CHLORIDE 20MEQ/15ML
20 LIQUID (ML) ORAL DAILY
COMMUNITY

## 2022-01-17 RX ORDER — FENTANYL CITRATE 50 UG/ML
25 INJECTION, SOLUTION INTRAMUSCULAR; INTRAVENOUS
Status: DISCONTINUED | OUTPATIENT
Start: 2022-01-17 | End: 2022-01-21 | Stop reason: HOSPADM

## 2022-01-17 RX ORDER — INSULIN GLARGINE 100 [IU]/ML
80 INJECTION, SOLUTION SUBCUTANEOUS 2 TIMES DAILY
COMMUNITY

## 2022-01-17 RX ORDER — BUMETANIDE 2 MG/1
2 TABLET ORAL 2 TIMES DAILY
Status: ON HOLD | COMMUNITY
End: 2022-01-21 | Stop reason: HOSPADM

## 2022-01-17 RX ORDER — SPIRONOLACTONE 25 MG/1
75 TABLET ORAL 2 TIMES DAILY
Status: ON HOLD | COMMUNITY
End: 2022-01-21 | Stop reason: HOSPADM

## 2022-01-17 RX ORDER — OXYCODONE AND ACETAMINOPHEN 7.5; 325 MG/1; MG/1
1 TABLET ORAL EVERY 6 HOURS PRN
COMMUNITY

## 2022-01-17 RX ORDER — LORAZEPAM 2 MG/ML
0.5 INJECTION INTRAMUSCULAR EVERY 4 HOURS PRN
Status: DISCONTINUED | OUTPATIENT
Start: 2022-01-17 | End: 2022-01-17 | Stop reason: DRUGHIGH

## 2022-01-17 RX ORDER — HYDROXYZINE HYDROCHLORIDE 25 MG/1
25 TABLET, FILM COATED ORAL EVERY 8 HOURS PRN
COMMUNITY

## 2022-01-17 RX ORDER — VENLAFAXINE HYDROCHLORIDE 150 MG/1
150 CAPSULE, EXTENDED RELEASE ORAL DAILY
Status: ON HOLD | COMMUNITY
End: 2022-01-21 | Stop reason: HOSPADM

## 2022-01-17 RX ORDER — IBUPROFEN 600 MG/1
600 TABLET ORAL EVERY 6 HOURS PRN
COMMUNITY

## 2022-01-17 RX ORDER — LORAZEPAM 2 MG/ML
1 INJECTION INTRAMUSCULAR EVERY 4 HOURS PRN
Status: DISCONTINUED | OUTPATIENT
Start: 2022-01-17 | End: 2022-01-21 | Stop reason: HOSPADM

## 2022-01-17 RX ORDER — LEVOTHYROXINE SODIUM 0.2 MG/1
200 TABLET ORAL DAILY
Status: ON HOLD | COMMUNITY
End: 2022-01-21 | Stop reason: HOSPADM

## 2022-01-17 RX ORDER — LANOLIN ALCOHOL/MO/W.PET/CERES
325 CREAM (GRAM) TOPICAL
COMMUNITY

## 2022-01-17 RX ORDER — GLIPIZIDE 10 MG/1
10 TABLET ORAL
Status: ON HOLD | COMMUNITY
End: 2022-01-21 | Stop reason: HOSPADM

## 2022-01-17 RX ORDER — BETHANECHOL CHLORIDE 10 MG/1
10 TABLET ORAL 3 TIMES DAILY
Status: ON HOLD | COMMUNITY
End: 2022-01-21 | Stop reason: HOSPADM

## 2022-01-17 RX ORDER — SODIUM CHLORIDE 450 MG/100ML
INJECTION, SOLUTION INTRAVENOUS CONTINUOUS
Status: DISCONTINUED | OUTPATIENT
Start: 2022-01-17 | End: 2022-01-19

## 2022-01-17 RX ADMIN — LORAZEPAM 0.5 MG: 2 INJECTION INTRAMUSCULAR; INTRAVENOUS at 04:32

## 2022-01-17 RX ADMIN — LORAZEPAM 1 MG: 2 INJECTION INTRAMUSCULAR; INTRAVENOUS at 14:50

## 2022-01-17 RX ADMIN — LORAZEPAM 1 MG: 2 INJECTION INTRAMUSCULAR; INTRAVENOUS at 21:42

## 2022-01-17 RX ADMIN — INSULIN LISPRO 1 UNITS: 100 INJECTION, SOLUTION INTRAVENOUS; SUBCUTANEOUS at 08:40

## 2022-01-17 RX ADMIN — INSULIN LISPRO 2 UNITS: 100 INJECTION, SOLUTION INTRAVENOUS; SUBCUTANEOUS at 17:36

## 2022-01-17 RX ADMIN — DULOXETINE HYDROCHLORIDE 60 MG: 60 CAPSULE, DELAYED RELEASE ORAL at 08:40

## 2022-01-17 RX ADMIN — FENTANYL CITRATE 25 MCG: 50 INJECTION, SOLUTION INTRAMUSCULAR; INTRAVENOUS at 05:42

## 2022-01-17 RX ADMIN — HEPARIN SODIUM 5000 UNITS: 5000 INJECTION INTRAVENOUS; SUBCUTANEOUS at 21:09

## 2022-01-17 RX ADMIN — SODIUM CHLORIDE, PRESERVATIVE FREE 10 ML: 5 INJECTION INTRAVENOUS at 21:25

## 2022-01-17 RX ADMIN — MEROPENEM 500 MG: 500 INJECTION, POWDER, FOR SOLUTION INTRAVENOUS at 17:39

## 2022-01-17 RX ADMIN — SODIUM CHLORIDE 1.4 MCG/KG/HR: 9 INJECTION, SOLUTION INTRAVENOUS at 08:43

## 2022-01-17 RX ADMIN — LORAZEPAM 0.05 MG: 2 INJECTION INTRAMUSCULAR; INTRAVENOUS at 06:20

## 2022-01-17 RX ADMIN — METHYLPREDNISOLONE SODIUM SUCCINATE 40 MG: 40 INJECTION, POWDER, FOR SOLUTION INTRAMUSCULAR; INTRAVENOUS at 00:12

## 2022-01-17 RX ADMIN — DEXMEDETOMIDINE HYDROCHLORIDE 1.4 MCG/KG/HR: 100 INJECTION, SOLUTION INTRAVENOUS at 23:01

## 2022-01-17 RX ADMIN — LORAZEPAM 1 MG: 2 INJECTION INTRAMUSCULAR; INTRAVENOUS at 10:13

## 2022-01-17 RX ADMIN — HEPARIN SODIUM 5000 UNITS: 5000 INJECTION INTRAVENOUS; SUBCUTANEOUS at 14:41

## 2022-01-17 RX ADMIN — SODIUM CHLORIDE 1.2 MCG/KG/HR: 9 INJECTION, SOLUTION INTRAVENOUS at 06:12

## 2022-01-17 RX ADMIN — DEXMEDETOMIDINE HYDROCHLORIDE 1.4 MCG/KG/HR: 100 INJECTION, SOLUTION INTRAVENOUS at 17:37

## 2022-01-17 RX ADMIN — TAMSULOSIN HYDROCHLORIDE 0.4 MG: 0.4 CAPSULE ORAL at 08:40

## 2022-01-17 RX ADMIN — IPRATROPIUM BROMIDE AND ALBUTEROL SULFATE 1 AMPULE: .5; 2.5 SOLUTION RESPIRATORY (INHALATION) at 20:48

## 2022-01-17 RX ADMIN — OXYBUTYNIN CHLORIDE 5 MG: 5 TABLET ORAL at 21:11

## 2022-01-17 RX ADMIN — METHYLPREDNISOLONE SODIUM SUCCINATE 40 MG: 40 INJECTION, POWDER, FOR SOLUTION INTRAMUSCULAR; INTRAVENOUS at 23:18

## 2022-01-17 RX ADMIN — DEXMEDETOMIDINE HYDROCHLORIDE 1.4 MCG/KG/HR: 100 INJECTION, SOLUTION INTRAVENOUS at 12:51

## 2022-01-17 RX ADMIN — DEXTROSE AND SODIUM CHLORIDE 50 ML/HR: 5; 900 INJECTION, SOLUTION INTRAVENOUS at 03:50

## 2022-01-17 RX ADMIN — ASPIRIN 81 MG: 81 TABLET, COATED ORAL at 08:40

## 2022-01-17 RX ADMIN — OXYBUTYNIN CHLORIDE 5 MG: 5 TABLET ORAL at 08:40

## 2022-01-17 RX ADMIN — METHYLPREDNISOLONE SODIUM SUCCINATE 40 MG: 40 INJECTION, POWDER, FOR SOLUTION INTRAMUSCULAR; INTRAVENOUS at 10:13

## 2022-01-17 RX ADMIN — IPRATROPIUM BROMIDE AND ALBUTEROL SULFATE 1 AMPULE: .5; 2.5 SOLUTION RESPIRATORY (INHALATION) at 14:30

## 2022-01-17 RX ADMIN — SODIUM CHLORIDE 0.8 MCG/KG/HR: 9 INJECTION, SOLUTION INTRAVENOUS at 04:15

## 2022-01-17 RX ADMIN — FENTANYL CITRATE 25 MCG: 50 INJECTION, SOLUTION INTRAMUSCULAR; INTRAVENOUS at 04:44

## 2022-01-17 RX ADMIN — INSULIN LISPRO 1 UNITS: 100 INJECTION, SOLUTION INTRAVENOUS; SUBCUTANEOUS at 21:10

## 2022-01-17 RX ADMIN — SODIUM CHLORIDE: 4.5 INJECTION, SOLUTION INTRAVENOUS at 12:15

## 2022-01-17 RX ADMIN — IPRATROPIUM BROMIDE AND ALBUTEROL SULFATE 1 AMPULE: .5; 2.5 SOLUTION RESPIRATORY (INHALATION) at 11:55

## 2022-01-17 RX ADMIN — MEROPENEM 500 MG: 500 INJECTION, POWDER, FOR SOLUTION INTRAVENOUS at 06:25

## 2022-01-17 ASSESSMENT — PAIN SCALES - GENERAL
PAINLEVEL_OUTOF10: 6
PAINLEVEL_OUTOF10: 0
PAINLEVEL_OUTOF10: 0
PAINLEVEL_OUTOF10: 6
PAINLEVEL_OUTOF10: 0

## 2022-01-17 ASSESSMENT — ENCOUNTER SYMPTOMS
RESPIRATORY NEGATIVE: 1
ALLERGIC/IMMUNOLOGIC NEGATIVE: 1
GASTROINTESTINAL NEGATIVE: 1

## 2022-01-17 NOTE — PROGRESS NOTES
Renal Progress Note    Patient :  Joselin Isaacs; 61 y.o. MRN# [id number]  Location:  2031/2031-01  Attending:  Kyler Anderson DO  Admit Date:  1/15/2022   Hospital Day: 2      Subjective:     Patient was seen and examined. No new issues reported overnight. Has +ve confusion. Currently has Clemons catheter in place, has chronic Clemons status post exchange this admission. Urine output documented as about 5.4 L in the last 24 hours. Serum creatinine did improve to 2.2 mg/dl. Other electrolytes okay, except chloride 108. On normal saline with D5W at 75 cc an hour. Requiring pressor support x1. Outpatient Medications:     Medications Prior to Admission: clonazePAM (KLONOPIN) 0.5 MG tablet, Take 1 tablet by mouth 2 times daily for 3 days. diphenoxylate-atropine (LOMOTIL) 2.5-0.025 MG per tablet, Take 1 tablet by mouth 4 times daily as needed for Diarrhea. albuterol sulfate HFA (PROAIR HFA) 108 (90 Base) MCG/ACT inhaler, Inhale 2 puffs into the lungs every 6 hours as needed for Wheezing  DULoxetine (CYMBALTA) 60 MG capsule, Take 60 mg by mouth every morning   clonazePAM (KLONOPIN) 0.5 MG tablet, Take 1 tablet by mouth 2 times daily for 3 days. furosemide (LASIX) 40 MG tablet, Take 1 tablet by mouth daily  losartan (COZAAR) 50 MG tablet, Take 1 tablet by mouth daily  pregabalin (LYRICA) 150 MG capsule, Take 150 mg by mouth 2 times daily.   SITagliptin (JANUVIA) 100 MG tablet, Take 100 mg by mouth daily  nystatin (MYCOSTATIN) 360881 UNIT/GM cream, Apply topically 2 times daily as needed (to skin folds)  QUEtiapine (SEROQUEL XR) 50 MG extended release tablet, Take 50 mg by mouth nightly  triamcinolone (KENALOG) 0.025 % cream, Apply topically 2 times daily as needed  fluocinonide (LIDEX) 0.05 % external solution, Apply topically as needed (scalp itching)  ketoconazole (NIZORAL) 2 % shampoo, Apply topically Twice a Week  tamsulosin (FLOMAX) 0.4 MG capsule, Take 1 capsule by mouth daily  oxybutynin (DITROPAN) 5 MG 132/65  BP Range: Systolic (96OHW), NGL:568 , Min:100 , SXJ:462       Diastolic (16FVC), KIP:62, Min:50, Max:130      Physical Examination:     General:  Positive confusion currently on BiPAP. HEENT: Atraumatic, normocephalic, no throat congestion, moist mucosa. Eyes:   Pupils equal, round and reactive to light, EOMI. Neck:   Supple  Chest:   Bilateral vesicular breath sounds, no rales or wheezes. Cardiac:  S1 S2 RR, no murmurs, gallops or rubs. Abdomen: Soft, non-tender, no masses or organomegaly, BS audible. :   No suprapubic or flank tenderness. Neuro: On BiPAP has positive confusion. SKIN:  No rashes, good skin turgor. Extremities:  No edema. Labs:       Recent Labs     01/15/22  1335 01/16/22  0610 01/17/22  0358   WBC 11.1 11.0 8.0   RBC 3.76* 3.86* 4.01*   HGB 10.1* 10.4* 10.7*   HCT 32.4* 33.5* 34.5*   MCV 86.2 86.8 86.0   MCH 26.9 26.9 26.7   MCHC 31.2 31.0 31.0   RDW 16.2* 16.5* 16.4*    170 157   MPV 10.9 10.1 10.1      BMP:   Recent Labs     01/15/22  2345 01/15/22  2345 01/16/22  0117 01/16/22  0610 01/17/22  0358   *  --   --  137 144   K 4.2  --   --  4.1 4.6   CL 98  --   --  100 108*   CO2 24  --   --  24 25   BUN 46*  --   --  42* 31*   CREATININE 5.22*  --   --  4.51* 2.00*   GLUCOSE 105*   < > 92 173* 116*   CALCIUM 7.8*  --   --  8.1* 9.2    < > = values in this interval not displayed.       Phosphorus:     Recent Labs     01/15/22  2345   PHOS 5.6*     Magnesium:    Recent Labs     01/15/22  1724 01/15/22  2345 01/16/22  0610   MG 1.2* 1.4* 1.8     Albumin:    Recent Labs     01/15/22  1335 01/15/22  2345 01/16/22  0610   LABALBU 3.4* 3.2* 3.0*     BNP:      Lab Results   Component Value Date    BNP NOT REPORTED 06/18/2014     MARISA:      Lab Results   Component Value Date    MARISA NEGATIVE 11/03/2021     SPEP:  Lab Results   Component Value Date    PROT 6.7 01/16/2022    ALBCAL 3.3 11/03/2021    ALBPCT 51 11/03/2021    LABALPH 0.2 11/03/2021    LABALPH 0.6 11/03/2021 A1PCT 3 11/03/2021    A2PCT 9 11/03/2021    LABBETA 1.0 11/03/2021    BETAPCT 15 11/03/2021    GAMGLOB 1.5 11/03/2021    GGPCT 23 11/03/2021    PATH PENDING 01/15/2022     UPEP:     Lab Results   Component Value Date    LABPE PENDING 01/15/2022     C3:     Lab Results   Component Value Date    C3 94 11/03/2021     C4:     Lab Results   Component Value Date    C4 21 11/03/2021     Hep BsAg:         Lab Results   Component Value Date    HEPBSAG NONREACTIVE 06/19/2014     Hep C AB:          Lab Results   Component Value Date    HEPCAB NONREACTIVE 06/19/2014       Urinalysis/Chemistries:      Lab Results   Component Value Date    NITRU POSITIVE 01/15/2022    COLORU PADMA 01/15/2022    PHUR 5.0 01/15/2022    WBCUA TOO NUMEROUS TO COUNT 01/15/2022    RBCUA TOO NUMEROUS TO COUNT 01/15/2022    MUCUS NOT REPORTED 01/15/2022    TRICHOMONAS NOT REPORTED 01/15/2022    YEAST NOT REPORTED 01/15/2022    BACTERIA MANY 01/15/2022    CLARITYU slightly cloudy 05/04/2016    SPECGRAV 1.037 01/15/2022    LEUKOCYTESUR MOD 01/15/2022    UROBILINOGEN Normal 01/15/2022    BILIRUBINUR  01/15/2022     Presumptive positive. Unable to confirm due to unavailability of reagent. BILIRUBINUR neg 05/04/2016    BILIRUBINUR 3+ 08/30/2011    BLOODU small 05/04/2016    GLUCOSEU NEGATIVE 01/15/2022    GLUCOSEU 1+ 08/30/2011    KETUA 1+ 01/15/2022    AMORPHOUS NOT REPORTED 01/15/2022     Urine Sodium:     Lab Results   Component Value Date    TICO <20 01/15/2022     Urine Potassium:    Lab Results   Component Value Date    KUR 10.3 11/03/2021     Urine Chloride:    Lab Results   Component Value Date    CLUR <20 01/15/2022     Urine Osmolarity:   Lab Results   Component Value Date    OSMOU 302 01/15/2022      Urine Creatinine:     Lab Results   Component Value Date    LABCREA 94.2 11/03/2021     Radiology:     Reviewed. Assessment:     1.  Acute Kidney Injury: Secondary to ischemic ATN from hypotension low flow related intravascular volume depletion, secondary to loop diuretics and ARB use, from underlying systemic inflammation response with obstructive uropathy complicated by UTI. Baseline creatinine prior to October 21 was 0.9-1.1 range since then has been in the 1.4-1.5 range which is now up to 6.2 as peak creatinine this admission-currently improving. Has had multiple episodes of acute kidney injury in the last few months requiring hospitalization. 2. Shock secondary to intravascular volume depletion, currently on IV fluids resuscitation along with pressor support. 3.  Chronic Clemons with obstructive apathy. 4.  Hypotension requiring pressor support. 5.  UTI. 6.  Hyperchloremia. 7.  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 Morbid obesity  9. Sleep apnea with hypercapnia  10. Respiratory failure  11. Type 2 diabetes with complications  12. Alcoholic liver cirrhosis with portal hypertension  13. Encephalopathy, likely due to underlying infection. Plan:   1. Change IV fluids to half-normal saline at 75 cc an hour. 2.  Antibiotics as per infectious disease per renal function. 3.  Continue monitor strict I's and O's renal function. 4.  BMP in AM.  5.  Continue ProAmatine 10 mg 3 times a day. 6.  Wean off pressor support. 7.  Will follow. Nutrition   Please ensure that patient is on a renal diet/TF. Avoid nephrotoxic drugs/contrast exposure. We will continue to follow along with you. Phong Santos MD  [facility]     This note is created with the assistance of a speech-recognition program. While intending to generate a document that actually reflects the content of the visit, no guarantees can be provided that every mistake has been identified and corrected by editing.

## 2022-01-17 NOTE — FLOWSHEET NOTE
Writer to 2031 per overhead page for [employer]. When writer arrived, Security was present, but patient appeared to be more calm. Writer speaks to the patient who seems quite confused and unable to form a coherent thought. Sitter is present. Patient doesn't seem to be combative at the moment. Spiritual Care will follow as needed.        01/17/22 0730   Encounter Summary   Services provided to: Patient   Referral/Consult From: Multi-disciplinary team   Support System Children   Continue Visiting   (1/17/22)   Complexity of Encounter Moderate   Length of Encounter 15 minutes   Crisis   Type Code   Assessment Approachable   Intervention Active listening;Nurtured hope   Outcome Acceptance

## 2022-01-17 NOTE — PROGRESS NOTES
[facility]  Progress Note    Aditi Sarkar  MRN: [id number]  Date: 1/17/2022  LOS: 2     Reason for F/U :   Urinary tract infection    Impression :   1. Encephalopathy likely toxic metabolic  2. Acute on chronic respiratory failure-hypercapnic and the patient on chronic home O2 therapy  3. Morbid obesity with obstructive sleep apnea  4. Sepsis with concern for septic shock  5. Alcoholic cirrhosis  6. Diabetes mellitus type 2 with associated diabetic neuropathy  7. Essential hypertension  8. Venous stasis dermatitis of lower extremities-chronic  9. Urinary retention with a chronic indwelling Clemons catheter and concern for catheter associated UTI  10. Diastolic congestive heart failure    Recommendations:   · The urine culture has grown out Pseudomonas aeruginosa and Enterococcus species. · I am not sure that the urine would be the cause of his hypercapnic respiratory failure. · The patient is not very compliant with treatment at home. · I will continue the antimicrobial therapy with meropenem for now to avoid Zosyn given the high sodium load. · Again I suspect that the urinalysis and urine culture is reflective of the chronic indwelling Clemons catheter    Infection Control Recommendations:   Universal precaution    Discharge Planning:   Estimated Length of IV antimicrobials: 7 days  Patient will need Midline Catheter Insertion/ PICC line Insertion: No  Patient will need: Home IV , [place],  SNF,  LTAC: Undetermined  Patient willneed outpatient wound care: No    Medical Decision making / Summary of Stay:   Aditi Sarkar is a 61y.o.-year-old male who was initially admitted on 1/15/2022. [facility] has a history of morbid obesity with a BMI of 57, diabetes mellitus type 2, essential hypertension, congestive heart failure, urinary retention and has an indwelling Clemons catheter, chronic pancreatitis, among other medical problems.   The patient has been hospitalized multiple times and he presented to the hospital due to altered mental status. The patient apparently was answering questions appropriately when EMS arrived but subsequently fell asleep. He is on home oxygen and in the emergency department the patient was confused. Work-up did include a CT scan of the abdomen pelvis that showed right-sided nonobstructive for lithiasis, cirrhotic liver morphology with sequelae of portal hypertension. A CT of the head showed no acute intracranial abnormality. VQ scan showed low probability for pulmonary embolism  Chest x-ray showed hypoinflated lungs with cardiomegaly again seen no acute consolidation or infiltrate and probable right lung base atelectasis. The patient was admitted for acute encephalopathy, acute kidney injury, and concern for urinary tract infection. Current evaluation:2022    /65   Pulse 72   Temp 97.8 °F (36.6 °C) (Temporal)   Resp 18   Ht 6' (1.829 m)   Wt (!) 360 lb (163.3 kg)   SpO2 97%   BMI 48.82 kg/m²     Temperature Range: Temp: 97.8 °F (36.6 °C) Temp  Av.6 °F (36.4 °C)  Min: 97.2 °F (36.2 °C)  Max: 98 °F (36.7 °C)  The patient is seen and evaluated at bedside he is awake and alert in no acute distress  The patient is on BiPAP at 30% FiO2. He has been quite agitated is actually restrained at the wrist bilaterally and is on Precedex drip. The patient has a sitter in the room with him and it is my understanding that he was quite agitated throughout the night. Review of Systems   Constitutional: Negative. Respiratory: Negative. Cardiovascular: Negative. Gastrointestinal: Negative. Genitourinary: Negative. Musculoskeletal: Negative. Allergic/Immunologic: Negative. Neurological: Negative. Physical Examination :     Physical Exam  Constitutional:       Appearance: He is well-developed. He is obese. Interventions: He is restrained. Face mask in place. HENT:      Head: Normocephalic and atraumatic.    Cardiovascular: Rate and Rhythm: Normal rate. Heart sounds: Normal heart sounds. No friction rub. No gallop. Pulmonary:      Breath sounds: Normal breath sounds. No wheezing. Abdominal:      General: Bowel sounds are normal.      Palpations: Abdomen is soft. There is no mass. Tenderness: There is no abdominal tenderness. Musculoskeletal:         General: Normal range of motion. Right lower leg: Edema present. Left lower leg: Edema present. Lymphadenopathy:      Cervical: No cervical adenopathy. Skin:     General: Skin is warm and dry. Comments: Dermatitis in the lower extremities bilaterally   Neurological:      Mental Status: He is alert and oriented to person, place, and time. Laboratory data:   I have independently reviewed the followinglabs:  CBC with Differential:   Recent Labs     01/15/22  1335 01/15/22  1335 01/16/22  0610 01/17/22  0358   WBC 11.1   < > 11.0 8.0   HGB 10.1*   < > 10.4* 10.7*   HCT 32.4*   < > 33.5* 34.5*      < > 170 157   LYMPHOPCT 9*  --   --   --    MONOPCT 6  --   --   --     < > = values in this interval not displayed. BMP:   Recent Labs     01/15/22  2345 01/15/22  2345 01/16/22  0610 01/17/22  0358   *   < > 137 144   K 4.2   < > 4.1 4.6   CL 98   < > 100 108*   CO2 24   < > 24 25   BUN 46*   < > 42* 31*   CREATININE 5.22*   < > 4.51* 2.00*   MG 1.4*  --  1.8  --     < > = values in this interval not displayed. Hepatic Function Panel:   Recent Labs     01/15/22  1335 01/15/22  1335 01/15/22  2345 01/16/22  0610   PROT 7.8  --   --  6.7   LABALBU 3.4*   < > 3.2* 3.0*   BILIDIR 0.17  --   --   --    IBILI 0.25  --   --   --    BILITOT 0.42  --   --  0.27*   ALKPHOS 163*  --   --  148*   ALT 12  --   --  11   AST 14  --   --  12    < > = values in this interval not displayed.          Lab Results   Component Value Date    PROCAL 0.34 01/15/2022    PROCAL 0.42 01/15/2022    PROCAL 2.33 10/21/2021     Lab Results   Component Value Date CRP 98.8 01/15/2022    CRP 42.3 11/06/2020    CRP 74.3 08/20/2017     Lab Results   Component Value Date    SEDRATE 77 (H) 11/06/2020         Lab Results   Component Value Date    DDIMER 1.01 01/15/2022    DDIMER 2.49 05/04/2016     Lab Results   Component Value Date    FERRITIN 160 01/15/2022    FERRITIN 109 05/07/2016     Lab Results   Component Value Date     01/15/2022     No results found for: FIBRINOGEN    Results in Past 30 Days  Result Component Current Result Ref Range Previous Result Ref Range   SARS-CoV-2, Rapid Not Detected (1/15/2022) Not Detected Not in Time Range      Lab Results   Component Value Date    COVID19 Not Detected 01/15/2022    COVID19 Not Detected 11/04/2021    COVID19 Not Detected 10/21/2021       No results for input(s): VANCOTROUGH in the last 72 hours. Imaging Studies:   ONE XRAY VIEW OF THE CHEST 1/17/2022 8:30 am  Impression   No radiologic evidence of acute cardiopulmonary disease.           RETROPERITONEAL ULTRASOUND OF THE KIDNEYS AND URINARY BLADDER 1/17/2022  Impression   Markedly limited assessment due to poor sonographic window.       No gross hydronephrosis.       Bladder not visualized.           Cultures:     Culture, Urine [[id number]] (Abnormal)  Collected: 01/15/22 1455   Order Status: Completed Specimen: Urine, clean catch Updated: 01/17/22 1253    Specimen Description . CLEAN CATCH URINE    Special Requests NOT REPORTED    Culture PSEUDOMONAS AERUGINOSA >[id number] CFU/ML Abnormal      PRESUMPTIVE ID: GROUP D ENTEROCOCCUS >[id number] CFU/ML   Susceptibility     Pseudomonas aeruginosa     BACTERIAL SUSCEPTIBILITY PANEL SHAHNAZ (Preliminary)     amikacin NOT REPORTED       ceFAZolin NOT REPORTED       cefepime 2  Sensitive     ciprofloxacin 2  Intermediate     gentamicin 2  Sensitive     meropenem NOT REPORTED       piperacillin-tazobactam <=4  Sensitive     tigecycline NOT REPORTED       tobramycin <=1  Sensitive                Culture, Blood 1 [[id number]] Collected: 01/15/22 1700   Order Status: Completed Specimen: Blood Updated: 01/16/22 2211    Specimen Description . BLOOD    Special Requests L HAND 10ML    Culture NO GROWTH 1 DAY   Culture, Blood 2 [[id number]] Collected: 01/15/22 1652   Order Status: Completed Specimen: Blood Updated: 01/16/22 2209    Specimen Description . BLOOD    Special Requests RAC 12ML    Culture NO GROWTH 1 DAY   COVID-19, Rapid [[id number]] Collected: 01/15/22 1335   Order Status: Completed Specimen: Nasopharyngeal Swab Updated: 01/15/22 1405    Specimen Description . NASOPHARYNGEAL SWAB    SARS-CoV-2, Rapid Not Detected    Comment:        Rapid NAAT:  The specimen is NEGATIVE for SARS-CoV-2, the novel coronavirus associated with   COVID-19.         The ID NOW COVID-19 assay is designed to detect the virus that causes COVID-19 in patients   with signs and symptoms of infection who are suspected of COVID-19. An individual without symptoms of COVID-19 and who is not shedding SARS-CoV-2 virus would   expect to have a negative (not detected) result in this assay. Negative results should be treated as presumptive and, if inconsistent with clinical signs   and symptoms or necessary for patient management,   should be tested with an alternative molecular assay.  Negative results do not preclude   SARS-CoV-2 infection and   should not be used as the sole basis for patient management decisions.         Fact sheet for Healthcare Providers: Tristan   Fact sheet for Patients: Tristan           Methodology: Isothermal Nucleic Acid Amplification             Medications:      midodrine  10 mg Oral TID    ipratropium-albuterol  1 ampule Inhalation 4x daily    methylPREDNISolone  40 mg IntraVENous Q12H    sodium chloride  1,000 mL IntraVENous Once    aspirin  81 mg Oral Daily    DULoxetine  60 mg Oral QAM    pantoprazole  40 mg Oral QAM AC    levothyroxine  175 mcg Oral Daily    oxybutynin 5 mg Oral BID    tamsulosin  0.4 mg Oral Daily    sodium chloride flush  5-40 mL IntraVENous 2 times per day    heparin (porcine)  5,000 Units SubCUTAneous 3 times per day    insulin lispro  0-6 Units SubCUTAneous TID WC    insulin lispro  0-3 Units SubCUTAneous Nightly    meropenem  500 mg IntraVENous Q12H           [facility]  Humberto Zavaleta MD  [employer] messaging  OFFICE: [phone]      Electronically signed by Humberto Zavaleta MD on 1/17/2022 at 12:52 PM  Thank you for allowing us to participate in the care of this patient. Please call with questions. This note iscreated with the assistance of a speech recognition program.  While intending to generate a document that actually reflects the content of the visit, the document can still have some errors including those of syntax andsound a like substitutions which may escape proof reading. In such instances, actual meaning can be extrapolated by contextual diversion.

## 2022-01-17 NOTE — DISCHARGE INSTR - COC
Continuity of Care Form    Patient Name: Akhil Abraham   :  1965  MRN:  [id number]    Admit date:  1/15/2022  Discharge date:  2022    Code Status Order: Full Code   Advance Directives:      Admitting Physician:  Patrice Anderson DO  PCP: Graham Vasques MD    Discharging Nurse: [facility] Unit/Room#: 2031/2031-01  Discharging Unit Phone Number: [phone]    Emergency Contact:   Extended Emergency Contact Information  Primary Emergency Contact: Antony Quiroz [facility] [address] [facility] Phone: [phone]  Relation: Child  Secondary Emergency Contact: Rudy Monroe [facility] [address] [facility] Phone: [phone]  Work Phone: [phone]  Mobile Phone: [phone]  Relation: Child    Past Surgical History:  Past Surgical History:   Procedure Laterality Date    ABDOMEN SURGERY      hernia repair x4 (ventral)    COLONOSCOPY          CYSTOSCOPY  2018    CYSTOSCOPY  2019    CYSTOSCOPY N/A 2019    CYSTOSCOPY DILATION performed by Quiana Suarez MD at . [facility] N/A 2019    CYSTOSCOPY/ DILATION NICOLE CATHETER EXCHANGE performed by Quiana Suarez MD at [address] [facility], COLON, [address]  2018    repair and reduction of recurrent incarcerated incisional hernia with mesh    [place] CYSTOURETHROSCOPY N/A 2018    CYSTOSCOPY Idamae Sable performed by Quiana Suarez MD at [address] [facility] Bilateral 2017    FOOT [address] with bone bx performed by Betzaida Hall DPM at [address] [facility] EGD TRANSORAL BIOPSY SINGLE/MULTIPLE N/A 2017    EGD BIOPSY performed by Adalberto Zaidi MD at [address] [facility]  2017    polypectomy    UPPER GASTROINTESTINAL ENDOSCOPY N/A 3/14/2019    EGD BIOPSY performed by Matias Worthy MD at [address] [facility] N/A 2018    REPAIR AND REDUCTION OF RECURRENT INCARCERATED INCISIONAL HERNIA WITH MESH performed by Mily Tipton MD at [facility]       Immunization History:   Immunization History   Administered Date(s) Administered    Influenza, Quadv, IM, PF (6 mo and older Fluzone, Flulaval, Fluarix, and 3 yrs and older Afluria) 10/29/2021       Active Problems:  Patient Active Problem List   Diagnosis Code    Alcoholic cirrhosis of liver ([facility] sober since 2013 K70.30    Peripheral edema R60.9    Bipolar disorder ([facility] F31.9    Type 2 diabetes mellitus with diabetic neuropathy, without long-term current use of insulin ([facility] E11.40    Essential hypertension, currently hypotensive I10    Dyslipidemia E78.5    Weakness R53.1    Hyponatremia E87.1    FABI (obstructive sleep apnea) G47.33    Primary osteoarthritis of right knee M17.11    Type 2 diabetes mellitus with diabetic neuropathy (HCC) E11.40    Acquired hypothyroidism E03.9    Urine retention R33.9    Anemia D64.9    Slow transit constipation K59.01    Constipation K59.00    Iron deficiency anemia due to chronic blood loss D50.0    Gastroesophageal reflux disease without esophagitis K21.9    LEONARDA (acute kidney injury) ([facility] N17.9    Secondary esophageal varices without bleeding (HCC) I85.10    Portal hypertension (HCC) K76.6    Morbid obesity with BMI of 50.0-59.9, adult (HCC) E66.01, Z68.43    Venous stasis dermatitis of both lower extremities I87.2    Cellulitis of perineum L03.315    Chronic indwelling Clemons catheter Z97.8    Anxiety and depression F41.9, F32. A    Back pain, chronic M54.9, G89.29    BPH (benign prostatic hyperplasia) N40.0    Chronic diastolic congestive heart failure (HCC) I50.32    Cirrhosis (HCC) K74.60    Diabetes mellitus (HCC) E11.9    GERD (gastroesophageal reflux disease) K21.9    Hepatitis K75.9    Hiatal hernia K44.9    Hypertension I10    IBS (irritable bowel syndrome) K58.9    Morbid obesity (HCC) E66.01    Neuropathy G62.9    On home oxygen therapy Z99.81    Sleep apnea G47.30 Thyroid disease E07.9    Urinary bladder neurogenic dysfunction N31.9    Urinary tract infection associated with indwelling urethral catheter ([facility]) T83.511A, N39.0    Musculoskeletal immobility Z74.09    Pyocystis N30.80    Myoclonic jerking G25.3    Thrombocytopenia ([facility]) D69.6    Hypotension I95.9    Sepsis with acute hypercapnic respiratory failure and septic shock ([facility]) A41.9, R65.21, J96.02    Acute kidney injury superimposed on chronic kidney disease ([facility] N17.9, N18.9    Somnolence R40.0    Acute respiratory acidosis E87.2       Isolation/Infection:   Isolation            Contact          Patient Infection Status       Infection Onset Added Last Indicated Last Indicated By Review Planned Expiration Resolved Resolved By    MDRO (multi-drug resistant organism) 10/14/21 10/16/21 10/14/21 Culture, Blood 1        Klebsiella urine 10/2021  Enterococcus  faecalis 1/2022    Resolved    COVID-19 (Rule Out) 01/15/22 01/15/22 01/15/22 COVID-19, Rapid (Ordered)   01/15/22 Rule-Out Test Resulted    COVID-19 (Rule Out) 10/22/21 10/22/21 10/22/21 Respiratory Panel, Molecular, with COVID-19 (Restricted: peds pts or suitable admitted adults) (Ordered)   10/25/21 Ijeoma White RN    COVID-19 (Rule Out) 10/21/21 10/21/21 10/21/21 COVID-19, Rapid (Ordered)   10/21/21 Rule-Out Test Resulted            Nurse Assessment:  Last Vital Signs: BP (!) 162/80   Pulse 81   Temp 97.8 °F (36.6 °C) (Temporal)   Resp 18   Ht 6' (1.829 m)   Wt (!) 360 lb (163.3 kg)   SpO2 (!) 89%   BMI 48.82 kg/m²     Last documented pain score (0-10 scale): Pain Level: 6  Last Weight:   Wt Readings from Last 1 Encounters:   01/15/22 (!) 360 lb (163.3 kg)     Mental Status:  oriented and alert    IV Access:  - None    Nursing Mobility/ADLs:  Walking   Dependent  Transfer  Dependent  Bathing  Dependent  Dressing  Dependent  Toileting  Dependent  Feeding  Independent  Med Admin  Independent  Med Delivery   whole    Wound Care Documentation and Therapy:        Elimination:  Continence: Bowel: Incontinent at times   Bladder: Chronic Wells   Urinary Catheter: Last Change Date 1/12/2022 and Indication for Use of Catheter: Urology/Urologist seeing this patient or inserted indwelling catheter and Acute urinary retention/obstruction   Colostomy/Ileostomy/Ileal Conduit: No       Date of Last BM: 1/21/2022    Intake/Output Summary (Last 24 hours) at 1/17/2022 1343  Last data filed at 1/17/2022 0400  Gross per 24 hour   Intake 1941.54 ml   Output 4200 ml   Net -2258.46 ml     I/O last 3 completed shifts: In: 5138.8 [I.V.:4988.8; IV Piggyback:150]  Out: 8000 [Urine:8000]    Safety Concerns: At Risk for Falls    Impairments/Disabilities:      Vision and Limited ambulation     Nutrition Therapy:  Current Nutrition Therapy:   - Oral Diet:  Carb Control 3 carbs/meal (1500kcals/day)    Routes of Feeding: Oral  Liquids: No Restrictions  Daily Fluid Restriction: no  Last Modified Barium Swallow with Video (Video Swallowing Test): not done    Treatments at the Time of Hospital Discharge:   Respiratory Treatments: See [facility]   Oxygen Therapy:  is on oxygen at 2 L/min per nasal cannula. Ventilator:    - BiPAP   IPAP: 20 cmH20, CPAP/EPAP: 8 cmH2O only when sleeping    Rehab Therapies: Physical Therapy and Occupational Therapy  Weight Bearing Status/Restrictions: No weight bearing restirctions  Other Medical Equipment (for information only, NOT a DME order):  wheelchair and walker  Other Treatments:   Skilled nurse assessment and resume SN visits from [facility]. 2. Resume home health aide as previously from [facility]  3.  Change wells cath per agency protocol     Patient's personal belongings (please select all that are sent with patient):  Glasses, Phone     RN SIGNATURE:  Electronically signed by Cyrus Summers RN on 1/21/22 at 4:54 PM EST    CASE MANAGEMENT/SOCIAL WORK SECTION    Inpatient Status Date: 1/15/2022    Readmission Risk Assessment Score:  Readmission Risk Risk of Unplanned Readmission:  51           Discharging to Facility/ Agency   Name: [address]  Address:   Phone: [phone]  Fax: [address]  phone [phone], fax [address] for home health aide. Phone [phone] and Fax [phone]    / signature: Electronically signed by LEILA Bob on 1/17/22 at 1:43 PM EST    PHYSICIAN SECTION    Prognosis: Good    Condition at Discharge: Stable    Rehab Potential (if transferring to Rehab): Fair    Recommended Labs or Other Treatments After Discharge: Home PT OT, care of indwelling Clemons catheter    Physician Certification: I certify the above information and transfer of Alfreda Mccormick  is necessary for the continuing treatment of the diagnosis listed and that he requires [address] for greater 30 days.      Update Admission H&P: No change in H&P    PHYSICIAN SIGNATURE:  Electronically signed by Norris Lorenzo MD on 1/21/22 at 4:03 PM EST

## 2022-01-17 NOTE — PROGRESS NOTES
[facility]  Office: [address], DO, Davina Espinosa, DO, Karen Krause, DO, [place] Boxer Blood, DO, Peri Henning MD, Timothy Tillman MD, Casimiro Miller MD, Rosemarie Roberts MD, Katie Martinez MD, Angie Duval MD, Dewey Varner MD, Mary Palumbo, DO, Yolette Pablo, DO, Nettie Sullivan MD,  Michelle Dill DO, Tiffanie Braun MD, Tianna Mendiola MD, Sree Valladares MD, Greg Lyon MD, Haley Chaudhry MD, Daleen Lennox, MD, Rufino Acharya MD, Francis Chang [facility], [facility], [facility], Margaret Mendoza, CNP, Jc Angelo, CNS, Helena Granda, CNP, Jennifer Long, CNP, Guido Rodriguez CNP, Tair Holland, CNP, Hildy Shone, [facility], [place] BRADFORD Wolff, Irma Agarwal, [facility], Debora Brandt [facility], Flores Lutz CNP, Jag Piña, CNP, Deepika Borges CNP, Marquis Jarquin, CNP, Lance Monsalve, [facility], [facility]    Progress Note    1/17/2022    8:15 AM    Name:   Betito Nunez  MRN:     [id number]     Acct:      [de-identified]   Room:   2031/2031-01   Day:  2  Admit Date:  1/15/2022  1:31 PM    PCP:   Fidelia Evangelista MD  Code Status:  Full Code    Subjective:     C/C:   Chief Complaint   Patient presents with    Altered Mental Status     Interval History Status: not changed. Confused, unable to answer questions for me  But does repeat my name    Tore off his bipap earlier and tried to strike staff    Brief History:     Per my MARK:  \"Ruben Duran is a 61 y.o. Non- / non  male who presents with Altered Mental Status   and is admitted to the hospital for the management of Sepsis ([facility]      Per the ER note Patient is a 59-year-old male with a history of CHF and COPD who presented to the emergency department by EMS from home secondary to altered mental status.  History as per EMS who stated sinus pain and altered throughout the day no history of trauma or fall however when EMS arrived patient was alert answering questions appropriately and then fell asleep.  Patient stated he is on oxygen but is not used at all times.  States that he is vaccinated; no known COVID exposure.  Denied any trauma or injury.  Patient denied difficulty speaking or weakness.  Patient denied chest pain, shortness of breath, nausea, vomiting, fevers or chills     Patient was seen in our ER on 1/12/2022 related to dysuria. According to the notes his Clemons catheter was changed out. They document no urinary retention after replacement of Clemons. At that visit his BUN and creatinine were 27/3. 69. The ER note from the 12th also notes a blood pressure of 85/50 with a pulse of 66. Patient told the ER staff that he has been running low. Patient was discharged. Today the patient's bun/creatinine is 46/ 6.21. On review of his chart he was hospitalized in November 2021 with a creatinine of 4.62 that trended down to 1.8 after receiving fluids. October 2021 the patient was admitted with pneumonia. According to the notes the patient developed LEONARDA from overdiuresis, ischemic ATN secondary to hypotension. At that time his creatinine bumped to 2.72 with fluids recovered to 1.48. \"    Review of Systems:     unobtainable      Medications: Allergies:     Allergies   Allergen Reactions    No Known Allergies        Current Meds:   Scheduled Meds:    midodrine  10 mg Oral TID    ipratropium-albuterol  1 ampule Inhalation 4x daily    methylPREDNISolone  40 mg IntraVENous Q12H    sodium chloride  1,000 mL IntraVENous Once    aspirin  81 mg Oral Daily    DULoxetine  60 mg Oral QAM    pantoprazole  40 mg Oral QAM AC    levothyroxine  175 mcg Oral Daily    oxybutynin  5 mg Oral BID    tamsulosin  0.4 mg Oral Daily    sodium chloride flush  5-40 mL IntraVENous 2 times per day    heparin (porcine)  5,000 Units SubCUTAneous 3 times per day    insulin lispro  0-6 Units SubCUTAneous TID WC    insulin lispro  0-3 Units SubCUTAneous Nightly    meropenem  500 mg IntraVENous Q12H     Continuous Infusions:    dexmedetomidine (PRECEDEX) IV infusion 1.4 mcg/kg/hr (01/17/22 0715)    sodium chloride      sodium chloride      dextrose      norepinephrine 7 mcg/min (01/17/22 0123)    dextrose 5 % and 0.9 % NaCl 50 mL/hr (01/17/22 6990)     PRN Meds: fentanNYL, LORazepam, sodium chloride, albuterol sulfate HFA, sodium chloride flush, sodium chloride, ondansetron **OR** ondansetron, acetaminophen **OR** acetaminophen, glucose, dextrose, glucagon (rDNA), dextrose    Data:     Past Medical History:   has a past medical history of Anxiety and depression, Back pain, chronic, BPH (benign prostatic hyperplasia), Cellulitis of left lower extremity, Cellulitis of right lower extremity, CHF (congestive heart failure) ([address] Cirrhosis ([address] Diabetes mellitus ([address] Epididymoorchitis, GERD (gastroesophageal reflux disease), H/O cardiac catheterization, Hepatitis, Hiatal hernia, History of alcohol abuse, Hyperlipidemia, Hypertension, IBS (irritable bowel syndrome), Incisional hernia with obstruction but no gangrene, Klebsiella sepsis ([address] Metabolic encephalopathy, Morbid obesity ([address] Neuropathy, On home oxygen therapy, On home oxygen therapy, Pancreatitis, Poisoning by insulin and oral hypoglycemic (antidiabetic) drugs, accidental (unintentional), initial encounter, Primary osteoarthritis of right knee, Retention, urine, SBO (small bowel obstruction) ([address] Septic shock ([address] Sleep apnea, Sleep apnea, obstructive, Thyroid disease, Urinary bladder neurogenic dysfunction, and Urinary tract infection associated with indwelling urethral catheter ([address] Social History:   reports that he has never smoked. He has never used smokeless tobacco. He reports that he does not drink alcohol and does not use drugs.      Family History:   Family History   Adopted: Yes   Problem Relation Age of Onset    No Known Problems Mother         Adopted    No Known Problems Father         Adopted Vitals:  /65   Pulse 72   Temp 97.8 °F (36.6 °C) (Axillary)   Resp 14   Ht 6' (1.829 m)   Wt (!) 360 lb (163.3 kg)   SpO2 98%   BMI 48.82 kg/m²   Temp (24hrs), Av.6 °F (36.4 °C), Min:97.2 °F (36.2 °C), Max:97.9 °F (36.6 °C)    Recent Labs     22  1622 22  1956 22  2358 22  0413   POCGLU 144* 126* 106 113*       I/O (24Hr): Intake/Output Summary (Last 24 hours) at 2022 0815  Last data filed at 2022 0400  Gross per 24 hour   Intake 1941.54 ml   Output 5400 ml   Net -3458.46 ml       Labs:  Hematology:  Recent Labs     01/15/22  1335 22  0610 22  035   WBC 11.1 11.0 8.0   RBC 3.76* 3.86* 4.01*   HGB 10.1* 10.4* 10.7*   HCT 32.4* 33.5* 34.5*   MCV 86.2 86.8 86.0   MCH 26.9 26.9 26.7   MCHC 31.2 31.0 31.0   RDW 16.2* 16.5* 16.4*    170 157   MPV 10.9 10.1 10.1   CRP 98.8*  --   --    INR  --  0.9  --    DDIMER 1.01*  --   --      Chemistry:  Recent Labs     01/15/22  1335 01/15/22  1335 01/15/22  1340 01/15/22  1724 01/15/22  2345 01/15/22  2345 22  0117 22  0610 22  0358   *   < >  --   --  134*  --   --  137 144   K 4.4   < >  --   --  4.2  --   --  4.1 4.6   CL 90*   < >  --   --  98  --   --  100 108*   CO2 23   < >  --   --  24  --   --  24 25   GLUCOSE 116*   < >   < >  --  105*   < > 92 173* 116*   BUN 46*   < >  --   --  46*  --   --  42* 31*   CREATININE 6.21*   < >  --   --  5.22*  --   --  4.51* 2.00*   MG  --   --   --  1.2* 1.4*  --   --  1.8  --    ANIONGAP 20*   < >  --   --  12  --   --  13 11   LABGLOM 9*   < >  --   --  11*  --   --  14* 35*   GFRAA 11*   < >  --   --  14*  --   --  16* 42*   CALCIUM 8.9   < >  --   --  7.8*  --   --  8.1* 9.2   PHOS  --   --   --   --  5.6*  --   --   --   --    PROBNP 1,574*  --   --   --   --   --   --   --  2,099*   TROPHS 53*  --   --   --   --   --   --   --  23*    < > = values in this interval not displayed.      Recent Labs     01/15/22  1335 01/15/22  134 01/15/22  1345 01/15/22  1803 01/15/22  2345 01/16/22  0115 01/16/22  0606 01/16/22  0610 01/16/22  0749 01/16/22  1131 01/16/22  1622 01/16/22  1956 01/16/22  2358 01/17/22  0413   PROT 7.8  --   --   --   --   --   --  6.7  --   --   --   --   --   --    LABALBU 3.4*  --   --   --  3.2*  --   --  3.0*  --   --   --   --   --   --    LABA1C 6.3*  --   --   --   --   --   --   --   --   --   --   --   --   --    TSH  --   --   --   --  1.95  --   --   --   --   --   --   --   --   --    AST 14  --   --   --   --   --   --  12  --   --   --   --   --   --    ALT 12  --   --   --   --   --   --  11  --   --   --   --   --   --    *  --   --   --   --   --   --   --   --   --   --   --   --   --    ALKPHOS 163*  --   --   --   --   --   --  148*  --   --   --   --   --   --    BILITOT 0.42  --   --   --   --   --   --  0.27*  --   --   --   --   --   --    BILIDIR 0.17  --   --   --   --   --   --   --   --   --   --   --   --   --    AMMONIA  --   --  <10*  --   --   --   --   --   --   --   --   --   --   --    POCGLU  --    < >  --    < >  --    < >   < >  --  132* 131* 144* 126* 106 113*    < > = values in this interval not displayed. ABG:  Lab Results   Component Value Date    POCPH 7.264 01/16/2022    PHART 7.330 06/18/2014    POCPCO2 64.0 01/16/2022    KMY6OUR 68.0 06/18/2014    POCPO2 78.3 01/16/2022    PO2ART 84.0 06/18/2014    POCHCO3 29.0 01/16/2022    GPF0CMK 34.9 06/18/2014    NBEA NOT REPORTED 01/16/2022    PBEA 1 01/16/2022    WJA2XKR NOT REPORTED 01/16/2022    VOJG4BWW 93 01/16/2022    M6UPXGXH 96.5 06/18/2014    FIO2 NOT REPORTED 01/17/2022     Lab Results   Component Value Date/Time    SPECIAL L HAND 10ML 01/15/2022 05:00 PM     Lab Results   Component Value Date/Time    CULTURE NO GROWTH 1 DAY 01/15/2022 05:00 PM       Radiology:  CT ABDOMEN PELVIS WO CONTRAST Additional Contrast? None    Result Date: 1/15/2022  1. Right nonobstructive nephrolithiasis.  2. Cirrhotic liver morphology with sequela of portal hypertension. Consider nonemergent liver MRI with [employer] for [address] screening. CT Head WO Contrast    Result Date: 1/15/2022  No acute intracranial abnormality. Senescent changes including mild cortical atrophy. NM LUNG SCAN PERFUSION ONLY    Result Date: 1/15/2022  Low Probability for Pulmonary Embolus. XR CHEST PORTABLE    Result Date: 1/15/2022  Hypoinflated lungs. Cardiomegaly again seen, when compared to the previous study performed 11/02/2021. No acute consolidation or infiltrate. Probable right lung base atelectasis. Echo 10/25/21:  CONCLUSIONS     Summary  Left ventricle appears normal in size. Mild to moderately increased left ventricular wall thickness, with  asymmetrical thickening of the septum. Left ventricular systolic function appears overall preserved. Estimated EF 50%. Unable to assess for wall motion abnormalities within the limits of the  study. Left atrial dilatation. Aortic valve is difficult to assess within the limitations of the study. Aortic valve right and left coronary cusps are sclerotic and appears to be  possible fused, cannot rule out Bicuspid AV. Unable to assess adequately for aortic stenosis, but does appear to be at  least mild aortic stenosis. Mitral valve sclerosis without stenosis.       Physical Examination:        General appearance:  Lethargic, no distress  Mental Status:  Confused; oriented to self only, and repeats my name  Lungs:  clear to auscultation bilaterally, normal effort  Heart:  regular rate and rhythm, positive aortic murmur  Abdomen:  soft, nontender, nondistended, normal bowel sounds, no masses, hepatomegaly, splenomegaly; obese  Extremities:  no redness, tenderness in the calves; chronic lymphedema  Skin:  Venous stasis changes    Assessment:        Hospital Problems           Last Modified POA    * (Principal) Sepsis with acute hypercapnic respiratory failure and septic shock ([address] 6/02/7084 Yes    Alcoholic cirrhosis of liver ([address] sober since 2013 1/15/2022 Yes    Bipolar disorder ([address] 1/15/2022 Yes    Type 2 diabetes mellitus with diabetic neuropathy, without long-term current use of insulin ([address] 1/15/2022 Yes    Essential hypertension, currently hypotensive 1/15/2022 Yes    FABI (obstructive sleep apnea) 1/15/2022 Yes    Type 2 diabetes mellitus with diabetic neuropathy ([address] (Chronic) 1/15/2022 Yes    Acquired hypothyroidism 1/15/2022 Yes    Venous stasis dermatitis of both lower extremities (Chronic) 1/15/2022 Yes    Chronic indwelling Clemons catheter (Chronic) 1/15/2022 Yes    BPH (benign prostatic hyperplasia) 1/15/2022 Yes    Chronic diastolic congestive heart failure ([address] 1/16/2022 Yes    On home oxygen therapy 1/15/2022 Yes    Overview Signed 11/4/2020  2:08 PM by SUNSHINE Goode - NP     prn         Urinary bladder neurogenic dysfunction 1/15/2022 Yes    Urinary tract infection associated with indwelling urethral catheter ([address] 1/15/2022 Yes    Acute kidney injury superimposed on chronic kidney disease ([address] 1/15/2022 Yes    Somnolence 1/15/2022 Yes    Acute respiratory acidosis 1/16/2022 Yes          Plan:        1. bipap as needed-if he cooperates  2. Cont antibiotics-so far urine growing gnr and enterococcus  3. Wean levophed as able; currently on 2mcg  4. Monitor renal function: improving-baseline cr probably 1.4-1.5; was 6.2 on admission  5. old echo reviewed  6.  Guarded prognosis    Sarah Castillo Blood, DO  1/17/2022  8:15 AM

## 2022-01-17 NOTE — PROGRESS NOTES
Transitions of Care Pharmacy Service   Medication Review    The patient's list of current home medications has been reviewed. He was discharged from [facility] a couple days ago. Source(s) of information: facility ([place] [facility]), [facility]    Other Notes He was receiving Synthroid 175mcg daily while at [facility] [place] but his prescription getting refilled today at [facility] is for 200mcg daily instead  He was getting Seroquel IR 100mg nightly while at [facility] but he is refilling XR 50mg nightly instead today from [address] [place] [address] Drug refilled his Basaglar insulin pen today but he did not have any orders for long-acting insulin while at [facility]; he only receiving Humalog sliding scale at facility         PROVIDER ACTION REQUESTED  Medications that need to be addressed by a physician/nurse practitioner:    Medication Action Requested        Remaining home med list is ready for physician review         Please feel free to call me with any questions about this encounter. Thank you. Scott Kenney [facility]   Transitions of Care Pharmacy Service  Phone:  [phone]  Fax: [phone]      Electronically signed by Scott Kenney [facility] on 1/17/2022 at 5:14 PM           Medications Prior to Admission:   bethanechol (URECHOLINE) 10 MG tablet, Take 10 mg by mouth 3 times daily  bumetanide (BUMEX) 2 MG tablet, Take 2 mg by mouth 2 times daily   fluocinonide (LIDEX) 0.05 % cream, Apply topically daily as needed (itching to groin)  ferrous sulfate (FE TABS 325) 325 (65 Fe) MG EC tablet, Take 325 mg by mouth daily (with breakfast)  glipiZIDE (GLUCOTROL) 10 MG tablet, Take 10 mg by mouth 2 times daily (before meals)  hydrOXYzine (ATARAX) 25 MG tablet, Take 25 mg by mouth every 8 hours as needed for Itching  clonazePAM (KLONOPIN) 1 MG tablet, Take 1 mg by mouth 2 times daily.   levothyroxine (SYNTHROID) 200 MCG tablet, Take 200 mcg by mouth Daily  metFORMIN (GLUCOPHAGE) 500 MG tablet, Take 500 mg by mouth 2 times daily (with meals)  naloxegol (MOVANTIK) 25 MG TABS tablet, Take 25 mg by mouth every morning  nadolol (CORGARD) 40 MG tablet, Take 40 mg by mouth daily  oxyCODONE-acetaminophen (PERCOCET) 7.5-325 MG per tablet, Take 1 tablet by mouth every 6 hours as needed. potassium chloride 20 MEQ/15ML (10%) oral solution, Take 20 mEq by mouth daily  rifaximin (XIFAXAN) 550 MG tablet, Take 550 mg by mouth 2 times daily  spironolactone (ALDACTONE) 25 MG tablet, Take 75 mg by mouth 2 times daily  venlafaxine (EFFEXOR XR) 150 MG extended release capsule, Take 150 mg by mouth daily  ibuprofen (ADVIL;MOTRIN) 600 MG tablet, Take 600 mg by mouth every 6 hours as needed for Pain  pregabalin (LYRICA) 150 MG capsule, Take 150 mg by mouth 2 times daily. SITagliptin (JANUVIA) 100 MG tablet, Take 100 mg by mouth daily  QUEtiapine (SEROQUEL XR) 50 MG extended release tablet, Take 50 mg by mouth nightly  diphenoxylate-atropine (LOMOTIL) 2.5-0.025 MG per tablet, Take 1 tablet by mouth 4 times daily as needed for Diarrhea.   albuterol sulfate HFA (PROAIR HFA) 108 (90 Base) MCG/ACT inhaler, Inhale 2 puffs into the lungs every 6 hours as needed for Wheezing  tamsulosin (FLOMAX) 0.4 MG capsule, Take 1 capsule by mouth daily  oxybutynin (DITROPAN) 5 MG tablet, Take 1 tablet by mouth 2 times daily  aspirin 81 MG EC tablet, Take 1 tablet by mouth daily  levothyroxine (SYNTHROID) 175 MCG tablet, Take 175 mcg by mouth Daily   nystatin (MYCOSTATIN) 233381 UNIT/GM powder, Apply topically 2 times daily Indications: to abdominal folds, groin TO ABDOMINAL FOLDS, GROIN   esomeprazole (NEXIUM) 40 MG capsule, Take 40 mg by mouth 2 times daily   DULoxetine (CYMBALTA) 60 MG capsule, Take 60 mg by mouth every morning   insulin glargine (BASAGLAR KWIKPEN) 100 UNIT/ML injection pen, Inject 80 Units into the skin 2 times daily  losartan (COZAAR) 50 MG tablet, Take 1 tablet by mouth daily  nystatin (MYCOSTATIN) 858002 UNIT/GM cream, Apply topically 2 times daily as needed (to skin folds)

## 2022-01-17 NOTE — CONSULTS
Pulmonary Medicine and Critical Care Consult    Patient - Cecille Calle   MRN -  [id number]   Acct # - [de-identified]   - 1965      Date of Admission -  1/15/2022  1:31 PM  Date of evaluation -  2022  Room - -   Hospital Day - 1  Consulting - Richelle Anderson DO Primary Care Physician - Linh Mixon MD     Reason for Consult      ICU management/respiratory failure  Assessment   · Acute on chronic  hypoxic and hypercarbicrespiratory failure  · Atelectasis  · Sepsis/septic shock  · Obstructive sleep apnea/Obesity hypoventilation  · Encephalopathy/CO2 narcosis  · Chronic urinary tension indwelling Clemons   · acute renal failure/hepatorenal syndrome  · Liver cirrhosis/history alcoholism    Recommendations   · Oxygen and BiPAP support  · Precedex as needed for agitation  · DuoNeb by nebulizer  · Solu-Medrol IV 40 every 12 hours  · N.p.o. until more alert-off BiPAP  · Wean off Levophed to MAP of 65  · Meropenem per ID  · Check urine culture blood culture  · Nephrology consult/monitor urine output  · poor prognosis  · Discussed with RN  · DVT prophylaxis     Problem List      Patient Active Problem List   Diagnosis    Alcoholic cirrhosis of liver ([address] sober since     Peripheral edema    Bipolar disorder ([address]    Type 2 diabetes mellitus with diabetic neuropathy, without long-term current use of insulin ([address]    Essential hypertension, currently hypotensive    Dyslipidemia    Weakness    Hyponatremia    FABI (obstructive sleep apnea)    Primary osteoarthritis of right knee    Type 2 diabetes mellitus with diabetic neuropathy ([address]    Acquired hypothyroidism    Urine retention    Anemia    Slow transit constipation    Constipation    Iron deficiency anemia due to chronic blood loss    Gastroesophageal reflux disease without esophagitis    LEONARDA (acute kidney injury) ([address]    Secondary esophageal varices without bleeding ([address]    Portal hypertension ([address]    Morbid obesity with BMI of 50.0-59.9, adult (HCC)    Venous stasis dermatitis of both lower extremities    Cellulitis of perineum    Chronic indwelling Clemons catheter    Anxiety and depression    Back pain, chronic    BPH (benign prostatic hyperplasia)    Chronic diastolic congestive heart failure (HCC)    Cirrhosis (HCC)    Diabetes mellitus (HCC)    GERD (gastroesophageal reflux disease)    Hepatitis    Hiatal hernia    Hypertension    IBS (irritable bowel syndrome)    Morbid obesity ([address]    Neuropathy    On home oxygen therapy    Sleep apnea    Thyroid disease    Urinary bladder neurogenic dysfunction    Urinary tract infection associated with indwelling urethral catheter (HCC)    Musculoskeletal immobility    Pyocystis    Myoclonic jerking    Thrombocytopenia (HCC)    Hypotension    Sepsis with acute hypercapnic respiratory failure and septic shock (HCC)    Acute kidney injury superimposed on chronic kidney disease (HCC)    Somnolence    Acute respiratory acidosis       HPI     Arelia Daft is 61 y.o.,  male, ad previous medical history of chronic respiratory failure on oxygen at night, obstructive sleep apnea, CHF, liver cirrhosis, diabetes, gastroesophageal flux disease, anxiety depression, obesity, hyperlipidemia, neuropathy, hypothyroidism. He presented to the hospital for altered mental status. He has not been using his oxygen consistently. He had his chronic Clemons catheter changed in the emergency room 3 days before. He was found to be hypotensive in the emergency room. He received IV fluid boluses. He was started on Levophed. He was significantly abdominal bloating initiated on BiPAP. He was admitted to ICU. He was initiated on meropenem for urosepsis per [facility] had poor tidal volumes when I saw him and had to adjust his BiPAP setting up to 20/8. He started waking up and following commands. He is confused not able to volunteer history.     PMHx   Past Medical History Diagnosis Date    Anxiety and depression     Back pain, chronic     BPH (benign prostatic hyperplasia)     Cellulitis of left lower extremity 8/21/2017    Cellulitis of right lower extremity 8/19/2017    CHF (congestive heart failure) ([address]     Cirrhosis ([address]     Diabetes mellitus ([address]     not checking bloodsugar at home    Epididymoorchitis     GERD (gastroesophageal reflux disease)     H/O cardiac catheterization not sure of date    no stents    Hepatitis     Pt does not know the type.  Hiatal hernia     History of alcohol abuse     Sober since 2013    Hyperlipidemia     not taking any medication    Hypertension     IBS (irritable bowel syndrome)     Incisional hernia with obstruction but no gangrene 5/20/2018    Klebsiella sepsis ([address] 02/37/4105    Metabolic encephalopathy     Morbid obesity (HCC)     Neuropathy     feet,toes    On home oxygen therapy     DME for home oxgygen at 2.5 lpm at HS and as needed    On home oxygen therapy     pt uses 2-3L NC @ home    Pancreatitis     x 5 episodes    Poisoning by insulin and oral hypoglycemic (antidiabetic) drugs, accidental (unintentional), initial encounter 1/12/2021    Primary osteoarthritis of right knee     Retention, urine 1/24/2018    SBO (small bowel obstruction) ([address] 5/19/2018    Septic shock ([address]     Sleep apnea     suspected juany, never has had PSG study. HAS NO MACHINE    Sleep apnea, obstructive     pt noncompliant w/ CPAP @ home.      Thyroid disease     Urinary bladder neurogenic dysfunction     Urinary tract infection associated with indwelling urethral catheter ([address] 11/4/2020      Past Surgical History        Procedure Laterality Date    ABDOMEN SURGERY      hernia repair x4 (ventral)    COLONOSCOPY      2012    CYSTOSCOPY  01/24/2018    CYSTOSCOPY  02/20/2019    CYSTOSCOPY N/A 2/20/2019    CYSTOSCOPY DILATION performed by Beatrice Gonzalez MD at [address] [place] [address] 8/23/2019    CYSTOSCOPY/ DILATION NICOLE CATHETER EXCHANGE performed by Siobhan Higuera MD at [address], COLON, DIAGNOSTIC     [address]  05/20/2018    repair and reduction of recurrent incarcerated incisional hernia with mesh    LA CYSTOURETHROSCOPY N/A 1/24/2018    CYSTOSCOPY Glendy Julita performed by Siobhan Higuera MD at [address] Bilateral 8/22/2017    FOOT DEBRIDEMENT INCISION AND DRAINAGE with bone bx performed by Ladonna Morley DPM at [address] EGD TRANSORAL BIOPSY SINGLE/MULTIPLE N/A 7/19/2017    EGD BIOPSY performed by Ra Jacobo MD at [facility]  07/19/2017    polypectomy    UPPER GASTROINTESTINAL ENDOSCOPY N/A 3/14/2019    EGD BIOPSY performed by Travis Cedeño MD at [facility] N/A 5/20/2018    REPAIR AND REDUCTION OF RECURRENT INCARCERATED INCISIONAL HERNIA WITH MESH performed by Seb Peres MD at [address] [facility]    Current Medications    midodrine  10 mg Oral TID    sodium chloride  1,000 mL IntraVENous Once    aspirin  81 mg Oral Daily    DULoxetine  60 mg Oral QAM    pantoprazole  40 mg Oral QAM AC    levothyroxine  175 mcg Oral Daily    oxybutynin  5 mg Oral BID    tamsulosin  0.4 mg Oral Daily    sodium chloride flush  5-40 mL IntraVENous 2 times per day    heparin (porcine)  5,000 Units SubCUTAneous 3 times per day    insulin lispro  0-6 Units SubCUTAneous TID WC    insulin lispro  0-3 Units SubCUTAneous Nightly    meropenem  500 mg IntraVENous Q12H     sodium chloride, albuterol sulfate HFA, sodium chloride flush, sodium chloride, ondansetron **OR** ondansetron, acetaminophen **OR** acetaminophen, glucose, dextrose, glucagon (rDNA), dextrose  IV Drips/Infusions   dexmedetomidine (PRECEDEX) IV infusion 0.4 mcg/kg/hr (01/16/22 2217)    sodium chloride      sodium chloride      dextrose      norepinephrine 9 mcg/min (01/16/22 2217)    dextrose 5 % and 0.9 % NaCl 35 mL/hr at 01/16/22 0731     Home Medications  Medications Prior to Admission: clonazePAM (KLONOPIN) 0.5 MG tablet, Take 1 tablet by mouth 2 times daily for 3 days. diphenoxylate-atropine (LOMOTIL) 2.5-0.025 MG per tablet, Take 1 tablet by mouth 4 times daily as needed for Diarrhea. albuterol sulfate HFA (PROAIR HFA) 108 (90 Base) MCG/ACT inhaler, Inhale 2 puffs into the lungs every 6 hours as needed for Wheezing  DULoxetine (CYMBALTA) 60 MG capsule, Take 60 mg by mouth every morning   clonazePAM (KLONOPIN) 0.5 MG tablet, Take 1 tablet by mouth 2 times daily for 3 days. furosemide (LASIX) 40 MG tablet, Take 1 tablet by mouth daily  losartan (COZAAR) 50 MG tablet, Take 1 tablet by mouth daily  pregabalin (LYRICA) 150 MG capsule, Take 150 mg by mouth 2 times daily.   SITagliptin (JANUVIA) 100 MG tablet, Take 100 mg by mouth daily  nystatin (MYCOSTATIN) 068016 UNIT/GM cream, Apply topically 2 times daily as needed (to skin folds)  QUEtiapine (SEROQUEL XR) 50 MG extended release tablet, Take 50 mg by mouth nightly  triamcinolone (KENALOG) 0.025 % cream, Apply topically 2 times daily as needed  fluocinonide (LIDEX) 0.05 % external solution, Apply topically as needed (scalp itching)  ketoconazole (NIZORAL) 2 % shampoo, Apply topically Twice a Week  tamsulosin (FLOMAX) 0.4 MG capsule, Take 1 capsule by mouth daily  oxybutynin (DITROPAN) 5 MG tablet, Take 1 tablet by mouth 2 times daily  aspirin 81 MG EC tablet, Take 1 tablet by mouth daily  levothyroxine (SYNTHROID) 175 MCG tablet, Take 175 mcg by mouth Daily   nystatin (MYCOSTATIN) 135718 UNIT/GM powder, Apply topically 2 times daily as needed TO ABDOMINAL FOLDS, GROIN   esomeprazole (NEXIUM) 40 MG capsule, Take 40 mg by mouth 2 times daily     Allergies    No known allergies  Social History     Social History     Socioeconomic History    Marital status:      Spouse name: Not on file    Number of children: Not on file    Years of education: Not on file    Highest education level: Not on file   Occupational History    Not on file   Tobacco Use    Smoking status: Never Smoker    Smokeless tobacco: Never Used   Vaping Use    Vaping Use: Never used   Substance and Sexual Activity    Alcohol use: No     Comment: quit drinking 2012    Drug use: No    Sexual activity: Not on file   Other Topics Concern    Not on file   Social History Narrative    Not on file     Social Determinants of Health     Financial Resource Strain:     Difficulty of Paying Living Expenses: Not on file   Food Insecurity:     Worried About Running Out of Food in the Last Year: Not on file    Pat of Food in the Last Year: Not on file   Transportation Needs:     Lack of Transportation (Medical): Not on file    Lack of Transportation (Non-Medical):  Not on file   Physical Activity:     Days of Exercise per Week: Not on file    Minutes of Exercise per Session: Not on file   Stress:     Feeling of Stress : Not on file   Social Connections:     Frequency of Communication with Friends and Family: Not on file    Frequency of Social Gatherings with Friends and Family: Not on file    Attends Mu-ism Services: Not on file    Active Member of [address] [place] [address] or Organizations: Not on file    Attends Club or Organization Meetings: Not on file    Marital Status: Not on file   Intimate Partner Violence:     Fear of Current or Ex-Partner: Not on file    Emotionally Abused: Not on file    Physically Abused: Not on file    Sexually Abused: Not on file   Housing Stability:     Unable to Pay for Housing in the Last Year: Not on file    Number of [place] in the Last Year: Not on file    Unstable Housing in the Last Year: Not on file     Family History          Adopted: Yes   Problem Relation Age of Onset    No Known Problems Mother         Adopted    No Known Problems Father         Adopted     ROS - 11 systems   Attempted not possible due to mental status  Vitals     height is 6' (1.829 m) and weight is 360 lb (163.3 kg) (abnormal). His temporal temperature is 97.6 °F (36.4 °C). His blood pressure is 138/59 (abnormal) and his pulse is 66. His respiration is 14 and oxygen saturation is 95%. Body mass index is 48.82 kg/m². I/O        Intake/Output Summary (Last 24 hours) at 1/16/2022 2243  Last data filed at 1/16/2022 2217  Gross per 24 hour   Intake 4936.43 ml   Output 6900 ml   Net -1963.57 ml     I/O last 3 completed shifts: In: 4753.4 [I.V.:4603.4; IV Piggyback:150]  Out: 5761.5 [LGKYW:8195.1]   Patient Vitals for the past 96 hrs (Last 3 readings):   Weight   01/15/22 1341 (!) 360 lb (163.3 kg)     Exam   General Appearance   lethargic confused on BiPAP  HEENT - Head is normocephalic, atraumatic. Pupil reactive to light  Neck - Supple, symmetrical, trachea midline and Soft, trachea midline and straight  Lungs - decreased breath sound no crackles or wheezes}  Cardiovascular - Heart sounds are normal.  Regular rhythm normal rate without murmur, gallop or rub. Abdomen - Soft, nontender, nondistended, no masses or organomegaly  Neurologic - CN II-XII are grossly intact.  There are no focal motor deficits  Skin - No bruising or bleeding  Extremities - No cyanosis, clubbing 1 +edema    Labs  - Old records and notes have been reviewed in [facility]   CBC     Lab Results   Component Value Date    WBC 11.0 01/16/2022    RBC 3.86 01/16/2022    RBC 3.93 04/02/2012    HGB 10.4 01/16/2022    HCT 33.5 01/16/2022     01/16/2022     04/02/2012    MCV 86.8 01/16/2022    MCH 26.9 01/16/2022    MCHC 31.0 01/16/2022    RDW 16.5 01/16/2022    METASPCT 1 08/30/2011    LYMPHOPCT 9 01/15/2022    MONOPCT 6 01/15/2022    BASOPCT 1 01/15/2022    MONOSABS 0.61 01/15/2022    LYMPHSABS 0.97 01/15/2022    EOSABS 0.10 01/15/2022    BASOSABS 0.05 01/15/2022    DIFFTYPE NOT REPORTED 01/15/2022     BMP   Lab Results   Component Value Date     01/16/2022    K 4.1 01/16/2022     01/16/2022 CO2 24 01/16/2022    BUN 42 01/16/2022    CREATININE 4.51 01/16/2022    GLUCOSE 173 01/16/2022    GLUCOSE 102 08/30/2011    CALCIUM 8.1 01/16/2022    MG 1.8 01/16/2022     LFTS  Lab Results   Component Value Date    ALKPHOS 148 01/16/2022    ALT 11 01/16/2022    AST 12 01/16/2022    PROT 6.7 01/16/2022    BILITOT 0.27 01/16/2022    BILIDIR 0.17 01/15/2022    IBILI 0.25 01/15/2022    LABALBU 3.0 01/16/2022    LABALBU 4.0 03/30/2012       Lab Results   Component Value Date    APTT 25.5 11/02/2021     INR   Lab Results   Component Value Date    INR 0.9 01/16/2022    INR 1.1 11/03/2021    INR 1.0 11/02/2021    PROTIME 12.5 01/16/2022    PROTIME 11.4 11/03/2021    PROTIME 10.9 11/02/2021       Radiology    CX r  Hypoinflated lungs.  Cardiomegaly again seen, when compared to the previous   study performed 11/02/2021.  No acute consolidation or infiltrate.  Probable   right lung base atelectasis       perfusion lung scan  Low Probability for Pulmonary Embolus    CTabdomen pelvis  . Right nonobstructive nephrolithiasis. 2. Cirrhotic liver morphology with sequela of portal hypertension.  Consider   nonemergent liver MRI with [facility] for [address] screening. CT brain  No acute intracranial abnormality.  Senescent changes including mild cortical   atrophy         (See actual reports for details)    \"Thank you for asking us to see this patient\"    Case discussed with nurse and patient/family. Questions and concerns addressed.     Electronically signed by     Benedicto Izaguirre MD on 1/16/2022 at 10:43 PM   cc35 min

## 2022-01-17 NOTE — PROGRESS NOTES
At [phone] patient woke up and broke off BiPAP mask and became increasingly agitated and restless. BiPAP was replaced. Precedex was increased per protocol and continued to be increased throughout the night as patient became increasingly agitated, confused, verbally abusive, and combative. Dr Ronda Scott was updated and notified throughout the night. Orders were received with little to no improvement in patient agitation upon administration of Ativan and Fentanyl. Patient experienced moments of being calm but did not last for more than 10 minutes at a time. Patient was given verbal and emotional support and reassurance throughout the night.

## 2022-01-17 NOTE — PROGRESS NOTES
Writer/RN just received call from Lab reporting critical values. Pt's creatinine is 5.64; Calcium is 5.4; and, Troponin is 274. This information was conveyed to Primary RN.

## 2022-01-17 NOTE — CONSULTS
Urology Consultation    Patient:  Negin Roman  MRN: [id number]    CHIEF COMPLAINT:  Retention and UTI    HISTORY OF PRESENT ILLNESS:   The patient is a 61 y.o. male who presents with mental status change. He is morbidly obese. He has a chronic indwelling wells. Urine culture is growing Pseudomonas aeruginosa and Enterococcus. He is being treated with Merrem. CT shows a non obstructive right kidney stone. Patient's old records, notes and chart reviewed and summarized above. Past Medical History:    Past Medical History:   Diagnosis Date    Anxiety and depression     Back pain, chronic     BPH (benign prostatic hyperplasia)     Cellulitis of left lower extremity 8/21/2017    Cellulitis of right lower extremity 8/19/2017    CHF (congestive heart failure) (HCC)     Cirrhosis ([address]     Diabetes mellitus ([address]     not checking bloodsugar at home    Epididymoorchitis     GERD (gastroesophageal reflux disease)     H/O cardiac catheterization not sure of date    no stents    Hepatitis     Pt does not know the type.  Hiatal hernia     History of alcohol abuse     Sober since 2013    Hyperlipidemia     not taking any medication    Hypertension     IBS (irritable bowel syndrome)     Incisional hernia with obstruction but no gangrene 5/20/2018    Klebsiella sepsis ([address] 99/07/4545    Metabolic encephalopathy     Morbid obesity (HCC)     Neuropathy     feet,toes    On home oxygen therapy     DME for home oxgygen at 2.5 lpm at HS and as needed    On home oxygen therapy     pt uses 2-3L NC @ home    Pancreatitis     x 5 episodes    Poisoning by insulin and oral hypoglycemic (antidiabetic) drugs, accidental (unintentional), initial encounter 1/12/2021    Primary osteoarthritis of right knee     Retention, urine 1/24/2018    SBO (small bowel obstruction) ([address] 5/19/2018    Septic shock ([address]     Sleep apnea     suspected juany, never has had PSG study.   HAS NO MACHINE    Sleep apnea, obstructive     pt noncompliant w/ CPAP @ home.      Thyroid disease     Urinary bladder neurogenic dysfunction     Urinary tract infection associated with indwelling urethral catheter ([facility] 11/4/2020       Past Surgical History:    Past Surgical History:   Procedure Laterality Date    ABDOMEN SURGERY      hernia repair x4 (ventral)    COLONOSCOPY      2012    CYSTOSCOPY  01/24/2018    CYSTOSCOPY  02/20/2019    CYSTOSCOPY N/A 2/20/2019    CYSTOSCOPY DILATION performed by Deepa Durand MD at [address] N/A 8/23/2019    CYSTOSCOPY/ DILATION NICOLE CATHETER EXCHANGE performed by Deepa Durand MD at [facility], COLON, DIAGNOSTIC     [address]  05/20/2018    repair and reduction of recurrent incarcerated incisional hernia with mesh    MT CYSTOURETHROSCOPY N/A 1/24/2018    CYSTOSCOPY Jennifer Pennington performed by Deepa Durand MD at [address] Bilateral 8/22/2017    FOOT [address] [facility] [place] with bone bx performed by Joel Kearns DPM at [facility] EGD TRANSORAL BIOPSY SINGLE/MULTIPLE N/A 7/19/2017    EGD BIOPSY performed by Seema Johnson MD at [place] [address]  07/19/2017    polypectomy    UPPER GASTROINTESTINAL ENDOSCOPY N/A 3/14/2019    EGD BIOPSY performed by Demetrio Coleman MD at [address] N/A 5/20/2018    REPAIR AND REDUCTION OF RECURRENT INCARCERATED INCISIONAL HERNIA WITH MESH performed by Kings Douglass MD at [address] [facility]       Medications:    Scheduled Meds:   midodrine  10 mg Oral TID    ipratropium-albuterol  1 ampule Inhalation 4x daily    methylPREDNISolone  40 mg IntraVENous Q12H    sodium chloride  1,000 mL IntraVENous Once    aspirin  81 mg Oral Daily    DULoxetine  60 mg Oral QAM    pantoprazole  40 mg Oral QAM AC    levothyroxine  175 mcg Oral Daily    oxybutynin  5 mg Oral BID    tamsulosin  0.4 mg Oral Daily  sodium chloride flush  5-40 mL IntraVENous 2 times per day    heparin (porcine)  5,000 Units SubCUTAneous 3 times per day    insulin lispro  0-6 Units SubCUTAneous TID WC    insulin lispro  0-3 Units SubCUTAneous Nightly    meropenem  500 mg IntraVENous Q12H     Continuous Infusions:   dexmedetomidine (PRECEDEX) IV infusion 1.4 mcg/kg/hr (01/17/22 1800)    sodium chloride 75 mL/hr at 01/17/22 1800    sodium chloride      sodium chloride      dextrose      norepinephrine Stopped (01/17/22 1315)     PRN Meds:.fentanNYL, LORazepam, sodium chloride, albuterol sulfate HFA, sodium chloride flush, sodium chloride, ondansetron **OR** ondansetron, acetaminophen **OR** acetaminophen, glucose, dextrose, glucagon (rDNA), dextrose    Allergies:  No known allergies    Social History:    Social History     Socioeconomic History    Marital status:      Spouse name: Not on file    Number of children: Not on file    Years of education: Not on file    Highest education level: Not on file   Occupational History    Not on file   Tobacco Use    Smoking status: Never Smoker    Smokeless tobacco: Never Used   Vaping Use    Vaping Use: Never used   Substance and Sexual Activity    Alcohol use: No     Comment: quit drinking 2012    Drug use: No    Sexual activity: Not on file   Other Topics Concern    Not on file   Social History Narrative    Not on file     Social Determinants of Health     Financial Resource Strain:     Difficulty of Paying Living Expenses: Not on file   Food Insecurity:     Worried About Running Out of Food in the Last Year: Not on file    Pat of Food in the Last Year: Not on file   Transportation Needs:     Lack of Transportation (Medical): Not on file    Lack of Transportation (Non-Medical):  Not on file   Physical Activity:     Days of Exercise per Week: Not on file    Minutes of Exercise per Session: Not on file   Stress:     Feeling of Stress : Not on file   Social Connections:     Frequency of Communication with Friends and Family: Not on file    Frequency of Social Gatherings with Friends and Family: Not on file    Attends Taoist Services: Not on file    Active Member of Clubs or Organizations: Not on file    Attends Club or Organization Meetings: Not on file    Marital Status: Not on file   Intimate Partner Violence:     Fear of Current or Ex-Partner: Not on file    Emotionally Abused: Not on file    Physically Abused: Not on file    Sexually Abused: Not on file   Housing Stability:     Unable to Pay for Housing in the Last Year: Not on file    Number of [place] in the Last Year: Not on file    Unstable Housing in the Last Year: Not on file       Family History:    Family History   Adopted: Yes   Problem Relation Age of Onset    No Known Problems Mother         Adopted    No Known Problems Father         Adopted       REVIEW OF SYSTEMS:  All systems reviewed and negative except for that already noted in the HPI.     Physical Exam:    This a 61 y.o. male   Patient Vitals for the past 24 hrs:   BP Temp Temp src Pulse Resp SpO2   01/17/22 1700 (!) 155/80 -- -- 70 19 95 %   01/17/22 1600 (!) 152/71 97 °F (36.1 °C) Temporal 68 16 --   01/17/22 1500 (!) 150/87 -- -- 89 22 93 %   01/17/22 1433 -- -- -- -- 24 --   01/17/22 1430 -- -- -- -- 18 --   01/17/22 1300 (!) 162/80 -- -- 81 -- (!) 89 %   01/17/22 1200 -- 97.8 °F (36.6 °C) Temporal -- -- --   01/17/22 1155 -- -- -- -- 18 97 %   01/17/22 0800 -- 98 °F (36.7 °C) Temporal -- 14 --   01/17/22 0732 -- -- -- -- 14 98 %   01/17/22 0600 132/65 -- -- 72 20 --   01/17/22 0545 (!) 151/71 -- -- 74 (!) 31 --   01/17/22 0530 (!) 100/50 -- -- 70 (!) 0 --   01/17/22 0500 (!) 164/79 -- -- 63 18 --   01/17/22 0445 (!) 111/98 -- -- 64 18 --   01/17/22 0435 -- 97.8 °F (36.6 °C) Axillary -- -- --   01/17/22 0430 (!) 163/78 -- -- 66 13 --   01/17/22 0415 (!) 159/75 -- -- 60 16 --   01/17/22 0400 131/66 97.9 °F (36.6 °C) -- 61 values in this interval not displayed. No results found for: PSA    Additional Lab/culture results:    Urinalysis:   Recent Labs     01/15/22  1455   COLORU PADMA*   PHUR 5.0   WBCUA TOO NUMEROUS TO COUNT   RBCUA TOO NUMEROUS TO COUNT   MUCUS NOT REPORTED   TRICHOMONAS NOT REPORTED   YEAST NOT REPORTED   BACTERIA MANY*   SPECGRAV 1.037*   LEUKOCYTESUR MOD*   UROBILINOGEN Normal   BILIRUBINUR Presumptive positive. Unable to confirm due to unavailability of reagent. *        -----------------------------------------------------------------  Imaging Results:    Assessment and Plan   Impression:    Patient Active Problem List   Diagnosis    Alcoholic cirrhosis of liver ([facility] sober since 2013    Peripheral edema    Bipolar disorder ([facility]    Type 2 diabetes mellitus with diabetic neuropathy, without long-term current use of insulin ([facility]    Essential hypertension, currently hypotensive    Dyslipidemia    Weakness    Hyponatremia    FABI (obstructive sleep apnea)    Primary osteoarthritis of right knee    Type 2 diabetes mellitus with diabetic neuropathy ([address]    Acquired hypothyroidism    Urine retention    Anemia    Slow transit constipation    Constipation    Iron deficiency anemia due to chronic blood loss    Gastroesophageal reflux disease without esophagitis    LEONARDA (acute kidney injury) ([address]    Secondary esophageal varices without bleeding (HCC)    Portal hypertension ([address]    Morbid obesity with BMI of 50.0-59.9, adult (HCC)    Venous stasis dermatitis of both lower extremities    Cellulitis of perineum    Chronic indwelling Clemons catheter    Anxiety and depression    Back pain, chronic    BPH (benign prostatic hyperplasia)    Chronic diastolic congestive heart failure (HCC)    Cirrhosis (HCC)    Diabetes mellitus ([address]    GERD (gastroesophageal reflux disease)    Hepatitis    Hiatal hernia    Hypertension    IBS (irritable bowel syndrome)    Morbid obesity ([facility]    Neuropathy    On home oxygen therapy    Sleep apnea    Thyroid disease    Urinary bladder neurogenic dysfunction    Urinary tract infection associated with indwelling urethral catheter (HCC)    Musculoskeletal immobility    Pyocystis    Myoclonic jerking    Thrombocytopenia (HCC)    Hypotension    Sepsis with acute hypercapnic respiratory failure and septic shock (HCC)    Acute kidney injury superimposed on chronic kidney disease (HCC)    Somnolence    Acute respiratory acidosis       Plan: Will change wells catheter while he is here. Continue Merrem. Observe right kidney stone.     Vikram Strong MD  6:50 PM 1/17/2022

## 2022-01-18 LAB
ANION GAP SERPL CALCULATED.3IONS-SCNC: 10 MMOL/L (ref 9–17)
BUN BLDV-MCNC: 27 MG/DL (ref 6–20)
BUN/CREAT BLD: 20 (ref 9–20)
CALCIUM SERPL-MCNC: 9.4 MG/DL (ref 8.6–10.4)
CHLORIDE BLD-SCNC: 108 MMOL/L (ref 98–107)
CO2: 24 MMOL/L (ref 20–31)
CREAT SERPL-MCNC: 1.34 MG/DL (ref 0.7–1.2)
EKG ATRIAL RATE: 84 BPM
EKG P AXIS: 62 DEGREES
EKG P-R INTERVAL: 158 MS
EKG Q-T INTERVAL: 424 MS
EKG QRS DURATION: 132 MS
EKG QTC CALCULATION (BAZETT): 501 MS
EKG R AXIS: -36 DEGREES
EKG T AXIS: 39 DEGREES
EKG VENTRICULAR RATE: 84 BPM
GFR AFRICAN AMERICAN: >60 ML/MIN
GFR NON-AFRICAN AMERICAN: 55 ML/MIN
GFR SERPL CREATININE-BSD FRML MDRD: ABNORMAL ML/MIN/{1.73_M2}
GFR SERPL CREATININE-BSD FRML MDRD: ABNORMAL ML/MIN/{1.73_M2}
GLUCOSE BLD-MCNC: 180 MG/DL (ref 75–110)
GLUCOSE BLD-MCNC: 191 MG/DL (ref 75–110)
GLUCOSE BLD-MCNC: 193 MG/DL (ref 75–110)
GLUCOSE BLD-MCNC: 202 MG/DL (ref 75–110)
GLUCOSE BLD-MCNC: 203 MG/DL (ref 75–110)
GLUCOSE BLD-MCNC: 209 MG/DL (ref 70–99)
HCT VFR BLD CALC: 33.9 % (ref 40.7–50.3)
HEMOGLOBIN: 10.3 G/DL (ref 13–17)
MCH RBC QN AUTO: 26.4 PG (ref 25.2–33.5)
MCHC RBC AUTO-ENTMCNC: 30.4 G/DL (ref 28.4–34.8)
MCV RBC AUTO: 86.9 FL (ref 82.6–102.9)
NRBC AUTOMATED: 0 PER 100 WBC
PDW BLD-RTO: 16.5 % (ref 11.8–14.4)
PLATELET # BLD: 136 K/UL (ref 138–453)
PMV BLD AUTO: 10.5 FL (ref 8.1–13.5)
POTASSIUM SERPL-SCNC: 5 MMOL/L (ref 3.7–5.3)
RBC # BLD: 3.9 M/UL (ref 4.21–5.77)
SODIUM BLD-SCNC: 142 MMOL/L (ref 135–144)
WBC # BLD: 6.7 K/UL (ref 3.5–11.3)

## 2022-01-18 PROCEDURE — 36415 COLL VENOUS BLD VENIPUNCTURE: CPT

## 2022-01-18 PROCEDURE — 2580000003 HC RX 258: Performed by: NURSE PRACTITIONER

## 2022-01-18 PROCEDURE — 80048 BASIC METABOLIC PNL TOTAL CA: CPT

## 2022-01-18 PROCEDURE — 6360000002 HC RX W HCPCS: Performed by: NURSE PRACTITIONER

## 2022-01-18 PROCEDURE — 99232 SBSQ HOSP IP/OBS MODERATE 35: CPT | Performed by: INTERNAL MEDICINE

## 2022-01-18 PROCEDURE — 6370000000 HC RX 637 (ALT 250 FOR IP): Performed by: NURSE PRACTITIONER

## 2022-01-18 PROCEDURE — 94660 CPAP INITIATION&MGMT: CPT

## 2022-01-18 PROCEDURE — 93010 ELECTROCARDIOGRAM REPORT: CPT | Performed by: INTERNAL MEDICINE

## 2022-01-18 PROCEDURE — 6370000000 HC RX 637 (ALT 250 FOR IP): Performed by: INTERNAL MEDICINE

## 2022-01-18 PROCEDURE — 2580000003 HC RX 258: Performed by: INTERNAL MEDICINE

## 2022-01-18 PROCEDURE — 94761 N-INVAS EAR/PLS OXIMETRY MLT: CPT

## 2022-01-18 PROCEDURE — 94640 AIRWAY INHALATION TREATMENT: CPT

## 2022-01-18 PROCEDURE — 2500000003 HC RX 250 WO HCPCS: Performed by: INTERNAL MEDICINE

## 2022-01-18 PROCEDURE — 85027 COMPLETE CBC AUTOMATED: CPT

## 2022-01-18 PROCEDURE — 6360000002 HC RX W HCPCS: Performed by: INTERNAL MEDICINE

## 2022-01-18 PROCEDURE — 2000000000 HC ICU R&B

## 2022-01-18 PROCEDURE — 82947 ASSAY GLUCOSE BLOOD QUANT: CPT

## 2022-01-18 PROCEDURE — 2700000000 HC OXYGEN THERAPY PER DAY

## 2022-01-18 RX ORDER — DOPAMINE HYDROCHLORIDE 160 MG/100ML
2-20 INJECTION, SOLUTION INTRAVENOUS CONTINUOUS
Status: DISCONTINUED | OUTPATIENT
Start: 2022-01-18 | End: 2022-01-21 | Stop reason: HOSPADM

## 2022-01-18 RX ORDER — CLONAZEPAM 0.5 MG/1
0.5 TABLET ORAL 2 TIMES DAILY
Status: DISCONTINUED | OUTPATIENT
Start: 2022-01-18 | End: 2022-01-21 | Stop reason: HOSPADM

## 2022-01-18 RX ADMIN — TAMSULOSIN HYDROCHLORIDE 0.4 MG: 0.4 CAPSULE ORAL at 08:48

## 2022-01-18 RX ADMIN — LEVOTHYROXINE SODIUM 175 MCG: 0.17 TABLET ORAL at 05:32

## 2022-01-18 RX ADMIN — DEXMEDETOMIDINE HYDROCHLORIDE 0.3 MCG/KG/HR: 100 INJECTION, SOLUTION INTRAVENOUS at 11:15

## 2022-01-18 RX ADMIN — PANTOPRAZOLE SODIUM 40 MG: 40 TABLET, DELAYED RELEASE ORAL at 05:32

## 2022-01-18 RX ADMIN — METHYLPREDNISOLONE SODIUM SUCCINATE 40 MG: 40 INJECTION, POWDER, FOR SOLUTION INTRAMUSCULAR; INTRAVENOUS at 10:56

## 2022-01-18 RX ADMIN — INSULIN LISPRO 1 UNITS: 100 INJECTION, SOLUTION INTRAVENOUS; SUBCUTANEOUS at 17:24

## 2022-01-18 RX ADMIN — FENTANYL CITRATE 25 MCG: 50 INJECTION, SOLUTION INTRAMUSCULAR; INTRAVENOUS at 20:55

## 2022-01-18 RX ADMIN — METHYLPREDNISOLONE SODIUM SUCCINATE 40 MG: 40 INJECTION, POWDER, FOR SOLUTION INTRAMUSCULAR; INTRAVENOUS at 22:45

## 2022-01-18 RX ADMIN — MEROPENEM 500 MG: 500 INJECTION, POWDER, FOR SOLUTION INTRAVENOUS at 05:42

## 2022-01-18 RX ADMIN — IPRATROPIUM BROMIDE AND ALBUTEROL SULFATE 1 AMPULE: .5; 2.5 SOLUTION RESPIRATORY (INHALATION) at 14:54

## 2022-01-18 RX ADMIN — IPRATROPIUM BROMIDE AND ALBUTEROL SULFATE 1 AMPULE: .5; 2.5 SOLUTION RESPIRATORY (INHALATION) at 07:28

## 2022-01-18 RX ADMIN — MIDODRINE HYDROCHLORIDE 10 MG: 10 TABLET ORAL at 08:48

## 2022-01-18 RX ADMIN — OXYBUTYNIN CHLORIDE 5 MG: 5 TABLET ORAL at 20:41

## 2022-01-18 RX ADMIN — MEROPENEM 500 MG: 500 INJECTION, POWDER, FOR SOLUTION INTRAVENOUS at 17:25

## 2022-01-18 RX ADMIN — SODIUM CHLORIDE, PRESERVATIVE FREE 10 ML: 5 INJECTION INTRAVENOUS at 20:43

## 2022-01-18 RX ADMIN — SODIUM CHLORIDE, PRESERVATIVE FREE 10 ML: 5 INJECTION INTRAVENOUS at 22:49

## 2022-01-18 RX ADMIN — INSULIN LISPRO 1 UNITS: 100 INJECTION, SOLUTION INTRAVENOUS; SUBCUTANEOUS at 08:53

## 2022-01-18 RX ADMIN — INSULIN LISPRO 2 UNITS: 100 INJECTION, SOLUTION INTRAVENOUS; SUBCUTANEOUS at 13:19

## 2022-01-18 RX ADMIN — INSULIN LISPRO 1 UNITS: 100 INJECTION, SOLUTION INTRAVENOUS; SUBCUTANEOUS at 20:41

## 2022-01-18 RX ADMIN — OXYBUTYNIN CHLORIDE 5 MG: 5 TABLET ORAL at 08:49

## 2022-01-18 RX ADMIN — CLONAZEPAM 0.5 MG: 0.5 TABLET ORAL at 20:42

## 2022-01-18 RX ADMIN — LORAZEPAM 1 MG: 2 INJECTION INTRAMUSCULAR; INTRAVENOUS at 22:45

## 2022-01-18 RX ADMIN — LORAZEPAM 1 MG: 2 INJECTION INTRAMUSCULAR; INTRAVENOUS at 02:23

## 2022-01-18 RX ADMIN — HEPARIN SODIUM 5000 UNITS: 5000 INJECTION INTRAVENOUS; SUBCUTANEOUS at 22:03

## 2022-01-18 RX ADMIN — DULOXETINE HYDROCHLORIDE 60 MG: 60 CAPSULE, DELAYED RELEASE ORAL at 08:49

## 2022-01-18 RX ADMIN — IPRATROPIUM BROMIDE AND ALBUTEROL SULFATE 1 AMPULE: .5; 2.5 SOLUTION RESPIRATORY (INHALATION) at 18:30

## 2022-01-18 RX ADMIN — SODIUM CHLORIDE: 4.5 INJECTION, SOLUTION INTRAVENOUS at 10:56

## 2022-01-18 RX ADMIN — HEPARIN SODIUM 5000 UNITS: 5000 INJECTION INTRAVENOUS; SUBCUTANEOUS at 05:32

## 2022-01-18 RX ADMIN — ASPIRIN 81 MG: 81 TABLET, COATED ORAL at 08:48

## 2022-01-18 RX ADMIN — HEPARIN SODIUM 5000 UNITS: 5000 INJECTION INTRAVENOUS; SUBCUTANEOUS at 13:20

## 2022-01-18 RX ADMIN — IPRATROPIUM BROMIDE AND ALBUTEROL SULFATE 1 AMPULE: .5; 2.5 SOLUTION RESPIRATORY (INHALATION) at 10:52

## 2022-01-18 RX ADMIN — DOPAMINE HYDROCHLORIDE IN DEXTROSE 2 MCG/KG/MIN: 1.6 INJECTION, SOLUTION INTRAVENOUS at 06:27

## 2022-01-18 ASSESSMENT — PAIN SCALES - GENERAL
PAINLEVEL_OUTOF10: 0
PAINLEVEL_OUTOF10: 7
PAINLEVEL_OUTOF10: 0

## 2022-01-18 ASSESSMENT — ENCOUNTER SYMPTOMS
GASTROINTESTINAL NEGATIVE: 1
RESPIRATORY NEGATIVE: 1
BACK PAIN: 1
ALLERGIC/IMMUNOLOGIC NEGATIVE: 1

## 2022-01-18 NOTE — PROGRESS NOTES
Jesús Doss   Urology Progress Note            Subjective:  Follow-up urinary retention    Patient Vitals for the past 24 hrs:   BP Temp Temp src Pulse Resp SpO2 Weight   01/18/22 1200 -- 97.7 °F (36.5 °C) Temporal -- -- -- --   01/18/22 1100 134/78 -- -- 56 16 100 % --   01/18/22 1052 -- -- -- -- 14 100 % --   01/18/22 1000 139/61 -- -- 53 16 99 % --   01/18/22 0900 (!) 134/51 -- -- 79 21 99 % --   01/18/22 0800 (!) 140/82 97.1 °F (36.2 °C) Temporal 71 22 99 % --   01/18/22 0738 -- -- -- -- 15 93 % --   01/18/22 0728 -- -- -- -- 14 92 % --   01/18/22 0700 136/61 -- -- 62 15 94 % --   01/18/22 0600 (!) 154/74 97.6 °F (36.4 °C) Temporal 50 15 98 % --   01/18/22 0500 (!) 151/72 97.6 °F (36.4 °C) Temporal (!) 49 16 99 % --   01/18/22 0400 (!) 152/69 97.2 °F (36.2 °C) Temporal (!) 42 14 98 % (!) 350 lb (158.8 kg)   01/18/22 0359 -- -- -- (!) 43 -- -- --   01/18/22 0358 -- -- -- (!) 44 -- -- --   01/18/22 0357 -- -- -- (!) 46 -- -- --   01/18/22 0356 -- -- -- (!) 48 -- -- --   01/18/22 0355 -- -- -- (!) 42 -- -- --   01/18/22 0354 -- -- -- (!) 44 -- -- --   01/18/22 0353 -- -- -- (!) 44 -- -- --   01/18/22 0352 -- -- -- (!) 43 -- -- --   01/18/22 0351 -- -- -- (!) 44 -- -- --   01/18/22 0350 -- -- -- (!) 42 -- -- --   01/18/22 0349 -- -- -- (!) 44 -- -- --   01/18/22 0348 -- -- -- (!) 43 -- -- --   01/18/22 0347 -- -- -- (!) 42 -- -- --   01/18/22 0346 -- -- -- (!) 43 -- -- --   01/18/22 0345 -- -- -- (!) 43 -- -- --   01/18/22 0344 -- -- -- (!) 44 -- -- --   01/18/22 0343 -- -- -- (!) 43 -- -- --   01/18/22 0342 -- -- -- (!) 43 -- -- --   01/18/22 0341 -- -- -- (!) 43 -- -- --   01/18/22 0340 -- -- -- (!) 43 -- -- --   01/18/22 0339 -- -- -- (!) 43 -- -- --   01/18/22 0338 -- -- -- (!) 42 -- -- --   01/18/22 0337 -- -- -- (!) 42 -- -- --   01/18/22 0336 -- -- -- (!) 43 -- -- --   01/18/22 0335 -- -- -- (!) 46 -- -- --   01/18/22 0334 -- -- -- (!) 40 -- -- --   01/18/22 0333 -- -- -- (!) 43 -- -- --   01/18/22 0332 -- -- -- (!) 43 -- -- --   01/18/22 0331 -- -- -- (!) 42 -- -- --   01/18/22 0330 -- -- -- (!) 40 -- -- --   01/18/22 0329 -- -- -- (!) 41 -- -- --   01/18/22 0328 -- -- -- (!) 42 -- -- --   01/18/22 0327 -- -- -- (!) 42 -- -- --   01/18/22 0326 -- -- -- (!) 46 -- -- --   01/18/22 0325 -- -- -- (!) 41 -- -- --   01/18/22 0324 -- -- -- (!) 41 -- -- --   01/18/22 0323 -- -- -- (!) 43 -- -- --   01/18/22 0322 -- -- -- (!) 44 -- -- --   01/18/22 0321 -- -- -- (!) 41 -- -- --   01/18/22 0320 -- -- -- (!) 42 -- -- --   01/18/22 0319 -- -- -- (!) 40 -- -- --   01/18/22 0318 -- -- -- (!) 43 -- -- --   01/18/22 0317 -- -- -- (!) 43 -- -- --   01/18/22 0316 -- -- -- (!) 42 -- -- --   01/18/22 0315 -- -- -- (!) 42 -- -- --   01/18/22 0314 -- -- -- (!) 42 -- -- --   01/18/22 0313 -- -- -- (!) 42 -- -- --   01/18/22 0312 -- -- -- (!) 43 -- -- --   01/18/22 0311 -- -- -- (!) 42 -- -- --   01/18/22 0310 -- -- -- (!) 41 -- -- --   01/18/22 0309 -- -- -- (!) 46 -- -- --   01/18/22 0308 -- -- -- (!) 41 -- -- --   01/18/22 0307 -- -- -- (!) 43 -- -- --   01/18/22 0306 -- -- -- (!) 42 -- -- --   01/18/22 0305 -- -- -- (!) 42 -- -- --   01/18/22 0304 -- -- -- (!) 41 -- -- --   01/18/22 0303 -- -- -- (!) 42 -- -- --   01/18/22 0302 -- -- -- (!) 44 -- -- --   01/18/22 0301 -- -- -- (!) 47 -- -- --   01/18/22 0300 (!) 150/72 (P) 97.6 °F (36.4 °C) (P) Temporal (!) 39 14 98 % --   01/18/22 0200 94/81 97.2 °F (36.2 °C) Temporal (!) 49 17 (!) 82 % --   01/18/22 0142 -- -- -- -- 20 98 % --   01/18/22 0130 (!) 153/73 97.3 °F (36.3 °C) Temporal (!) 43 16 -- --   01/18/22 0100 -- -- -- 66 18 -- --   01/18/22 0000 (!) 141/80 96.7 °F (35.9 °C) Temporal 72 22 -- --   01/17/22 2300 (!) 159/73 92.2 °F (33.4 °C) Temporal 53 20 98 % --   01/17/22 2200 (!) 171/128 97.2 °F (36.2 °C) Temporal 64 22 98 % --   01/17/22 2152 -- -- -- 57 -- -- --   01/17/22 2150 -- -- -- -- 22 -- --   01/17/22 2149 -- -- -- 56 -- -- --   01/17/22 2130 (!) 158/66 -- -- (!) 49 18 -- --   01/17/22 2100 (!) 149/135 97.2 °F (36.2 °C) Temporal 67 20 95 % --   01/17/22 2048 -- -- -- -- 19 95 % --   01/17/22 2000 (!) 135/114 97.1 °F (36.2 °C) Temporal 66 24 97 % --   01/17/22 1900 (!) 144/74 97 °F (36.1 °C) Temporal 76 24 94 % --   01/17/22 1800 (!) 155/128 -- -- 80 19 -- --   01/17/22 1700 (!) 155/80 -- -- 70 19 95 % --   01/17/22 1600 (!) 152/71 97 °F (36.1 °C) Temporal 68 16 -- --   01/17/22 1500 (!) 150/87 -- -- 89 22 93 % --   01/17/22 1433 -- -- -- -- 24 -- --   01/17/22 1430 -- -- -- -- 18 -- --       Intake/Output Summary (Last 24 hours) at 1/18/2022 1427  Last data filed at 1/18/2022 1100  Gross per 24 hour   Intake 4794.1 ml   Output 3075 ml   Net 1719.1 ml       Recent Labs     01/16/22  0610 01/17/22  0358 01/18/22  0444   WBC 11.0 8.0 6.7   HGB 10.4* 10.7* 10.3*   HCT 33.5* 34.5* 33.9*   MCV 86.8 86.0 86.9    157 136*     Recent Labs     01/15/22  2345 01/16/22  0610 01/17/22  0358 01/17/22  1626 01/18/22  0444   *   < > 144 140 142   K 4.2   < > 4.6 4.5 5.0   CL 98   < > 108* 106 108*   CO2 24   < > 25 26 24   PHOS 5.6*  --   --   --   --    BUN 46*   < > 31* 28* 27*   CREATININE 5.22*   < > 2.00* 1.56* 1.34*    < > = values in this interval not displayed. Recent Labs     01/15/22  1455   COLORU PADMA*   PHUR 5.0   WBCUA TOO NUMEROUS TO COUNT   RBCUA TOO NUMEROUS TO COUNT   MUCUS NOT REPORTED   TRICHOMONAS NOT REPORTED   YEAST NOT REPORTED   BACTERIA MANY*   SPECGRAV 1.037*   LEUKOCYTESUR MOD*   UROBILINOGEN Normal   BILIRUBINUR Presumptive positive. Unable to confirm due to unavailability of reagent. *       Additional Lab/culture results:    Physical Exam: Clemons catheter in place bladder decompressed no function improving    Interval Imaging Findings:    Impression:    Patient Active Problem List   Diagnosis    Alcoholic cirrhosis of liver ([facility] sober since 2013    Peripheral edema    Bipolar disorder ([facility]    Type 2 diabetes mellitus with diabetic neuropathy, without long-term current use of insulin ([address]    Essential hypertension, currently hypotensive    Dyslipidemia    Weakness    Hyponatremia    FABI (obstructive sleep apnea)    Primary osteoarthritis of right knee    Type 2 diabetes mellitus with diabetic neuropathy ([address]    Acquired hypothyroidism    Urine retention    Anemia    Slow transit constipation    Constipation    Iron deficiency anemia due to chronic blood loss    Gastroesophageal reflux disease without esophagitis    LEONARDA (acute kidney injury) ([address]    Secondary esophageal varices without bleeding (HCC)    Portal hypertension (HCC)    Morbid obesity with BMI of 50.0-59.9, adult (HCC)    Venous stasis dermatitis of both lower extremities    Cellulitis of perineum    Chronic indwelling Clemons catheter    Anxiety and depression    Back pain, chronic    BPH (benign prostatic hyperplasia)    Chronic diastolic congestive heart failure (HCC)    Cirrhosis (HCC)    Diabetes mellitus (HCC)    GERD (gastroesophageal reflux disease)    Hepatitis    Hiatal hernia    Hypertension    IBS (irritable bowel syndrome)    Morbid obesity (HCC)    Neuropathy    On home oxygen therapy    Sleep apnea    Thyroid disease    Urinary bladder neurogenic dysfunction    Urinary tract infection associated with indwelling urethral catheter (HCC)    Musculoskeletal immobility    Pyocystis    Myoclonic jerking    Thrombocytopenia (HCC)    Hypotension    Sepsis with acute hypercapnic respiratory failure and septic shock (HCC)    Acute kidney injury superimposed on chronic kidney disease (HCC)    Somnolence    Acute respiratory acidosis       Plan: Maintain indwelling Clemons the patient will eventually require cystoscopy    Richelle Lawrence MD  2:27 PM 1/18/2022

## 2022-01-18 NOTE — PROGRESS NOTES
Pulmonary Critical Care Progress Note    Patient seen for the follow up of Sepsis with acute hypercapnic respiratory failure and septic shock (HCC)     Subjective: He became agitated on Precedex overnight. I ordered Ativan and Haldol. He is now is sleepy on BiPAP 24/8 30 percent O2. He has been NPO. He was weaned off Levophed at 1 PM     Examination:    Vitals: BP (!) 155/80   Pulse 70   Temp 97 °F (36.1 °C) (Temporal)   Resp 19   Ht 6' (1.829 m)   Wt (!) 360 lb (163.3 kg)   SpO2 95%   BMI 48.82 kg/m²   SpO2  Av.3 %  Min: 89 %  Max: 98 %  General appearance: alert and cooperative with exam  Neck: No JVD  Lungs: decreased breath sounds no crackles  Heart: regular rate and rhythm, S1, S2 normal, no gallop  Abdomen: Soft, non tender, + BS  Extremities: no cyanosis or clubbing. No significant edema    LABs:    CBC:   Recent Labs     22  0610 22  0358   WBC 11.0 8.0   HGB 10.4* 10.7*   HCT 33.5* 34.5*    157     BMP:   Recent Labs     22  0358 22  1626    140   K 4.6 4.5   CO2 25 26   BUN 31* 28*   CREATININE 2.00* 1.56*   LABGLOM 35* 46*   GLUCOSE 116* 235*     PT/INR:   Recent Labs     22  0610   PROTIME 12.5   INR 0.9     APTT:No results for input(s): APTT in the last 72 hours. LIVER PROFILE:  Recent Labs     01/15/22  1335 01/15/22  1335 01/15/22  2345 22  0610   AST 14  --   --  12   ALT 12  --   --  11   LABALBU 3.4*   < > 3.2* 3.0*    < > = values in this interval not displayed. Radiology:    CXR    No radiologic evidence of acute cardiopulmonary disease.     Impression:  · Acute on chronic  hypoxic and hypercarbicrespiratory failure  · Atelectasis  · Sepsis/septic shock  · Obstructive sleep apnea/Obesity hypoventilation  · Encephalopathy/CO2 narcosis  · Chronic urinary tension indwelling Clemons   · acute renal failure/hepatorenal syndrome  · Liver cirrhosis/history alcoholism      Recommendations:  · Oxygen and BiPAP support  · Precedex as needed for agitation  · Ativan as needed  · DuoNeb by nebulizer  · Solu-Medrol IV 40 every 12 hours  · N.p.o. until more alert-off BiPAP  · Monitor BP off Levophed to MAP of 65  · Meropenem per ID  · Check urine culture blood culture  · Nephrology consult/monitor urine output  · poor prognosis  · Discussed with RN  · Discussed with sitter  · DVT prophylaxis       Salma Wayne MD, MD, [facility]  Pulmonary Critical Care and Sleep Medicine,  [facility]  Cell: [phone]  Office: [phone]  cc35 min

## 2022-01-18 NOTE — PROGRESS NOTES
[facility]  Progress Note    Viviane Sweeney  MRN: [id number]  Date: 1/18/2022  LOS: 3     Reason for F/U :   Urinary tract infection    Impression :   1. Encephalopathy likely toxic metabolic  2. Acute on chronic respiratory failure-hypercapnic   · on chronic home O2 therapy  · He has been on BiPAP  3. Morbid obesity with obstructive sleep apnea  4. Sepsis with concern for septic shock  5. Alcoholic cirrhosis  6. Diabetes mellitus type 2 with associated diabetic neuropathy  7. Essential hypertension  8. Venous stasis dermatitis of lower extremities-chronic  9. Urinary retention with a chronic indwelling Clemons catheter and concern for catheter associated UTI  · Urine culture with Pseudomonas aeruginosa and Enterococcus species unclear if this is pathogens or colonizers  10. Diastolic congestive heart failure    Recommendations:   · The patient continues on antimicrobial therapy with meropenem started 1/15/2022  · I will continue it for 7-day course of therapy through 1/21/2022  · The patient has been able to wean off the BiPAP but remains on Precedex at this time. · Continue supportive therapy and the sitter remains in the room    Infection Control Recommendations:   Universal precaution    Discharge Planning:   Estimated Length of IV antimicrobials: 7 days  Patient will need Midline Catheter Insertion/ PICC line Insertion: No  Patient will need: Home IV , [place],  SNF,  LTAC: Undetermined  Patient willneed outpatient wound care: No    Medical Decision making / Summary of Stay:   Viviane Sweeney is a 61y.o.-year-old male who was initially admitted on 1/15/2022. Barbi Granda has a history of morbid obesity with a BMI of 57, diabetes mellitus type 2, essential hypertension, congestive heart failure, urinary retention and has an indwelling Clemons catheter, chronic pancreatitis, among other medical problems.   The patient has been hospitalized multiple times and he presented to the hospital due to altered mental status. The patient apparently was answering questions appropriately when EMS arrived but subsequently fell asleep. He is on home oxygen and in the emergency department the patient was confused. Work-up did include a CT scan of the abdomen pelvis that showed right-sided nonobstructive for lithiasis, cirrhotic liver morphology with sequelae of portal hypertension. A CT of the head showed no acute intracranial abnormality. VQ scan showed low probability for pulmonary embolism  Chest x-ray showed hypoinflated lungs with cardiomegaly again seen no acute consolidation or infiltrate and probable right lung base atelectasis. The patient was admitted for acute encephalopathy, acute kidney injury, and concern for urinary tract infection. The patient is on BiPAP at 30% FiO2. He has been quite agitated is actually restrained at the wrist bilaterally and is on Precedex drip. Current evaluation:2022    /61   Pulse 53   Temp 97.1 °F (36.2 °C) (Temporal)   Resp 16   Ht 6' (1.829 m)   Wt (!) 350 lb (158.8 kg)   SpO2 99%   BMI 47.47 kg/m²     Temperature Range: Temp: 97.1 °F (36.2 °C) Temp  Av.9 °F (36.1 °C)  Min: 92.2 °F (33.4 °C)  Max: 97.8 °F (36.6 °C)  The patient is seen and evaluated at bedside he is awake and alert currently on Precedex. He has been afebrile and is off the BiPAP at this time. He complains of some generalized malaise including back pain. Review of Systems   Constitutional: Negative. Respiratory: Negative. Cardiovascular: Negative. Gastrointestinal: Negative. Genitourinary: Negative. Musculoskeletal: Positive for back pain. Allergic/Immunologic: Negative. Neurological: Negative. Physical Examination :     Physical Exam  Constitutional:       Appearance: He is well-developed. He is obese. Interventions: He is restrained. Face mask in place. HENT:      Head: Normocephalic and atraumatic.    Cardiovascular:      Rate and Rhythm: Normal rate. Heart sounds: Normal heart sounds. No friction rub. No gallop. Pulmonary:      Breath sounds: Normal breath sounds. No wheezing. Abdominal:      General: Bowel sounds are normal.      Palpations: Abdomen is soft. There is no mass. Tenderness: There is no abdominal tenderness. Musculoskeletal:         General: Normal range of motion. Right lower leg: Edema present. Left lower leg: Edema present. Lymphadenopathy:      Cervical: No cervical adenopathy. Skin:     General: Skin is warm and dry. Comments: Dermatitis in the lower extremities bilaterally   Neurological:      Mental Status: He is alert and oriented to person, place, and time. Laboratory data:   I have independently reviewed the followinglabs:  CBC with Differential:   Recent Labs     01/15/22  1335 01/16/22  0610 01/17/22  0358 01/18/22  0444   WBC 11.1   < > 8.0 6.7   HGB 10.1*   < > 10.7* 10.3*   HCT 32.4*   < > 34.5* 33.9*      < > 157 136*   LYMPHOPCT 9*  --   --   --    MONOPCT 6  --   --   --     < > = values in this interval not displayed. BMP:   Recent Labs     01/15/22  2345 01/15/22  2345 01/16/22  0610 01/17/22  0358 01/17/22  1626 01/18/22  0444   *   < > 137   < > 140 142   K 4.2   < > 4.1   < > 4.5 5.0   CL 98   < > 100   < > 106 108*   CO2 24   < > 24   < > 26 24   BUN 46*   < > 42*   < > 28* 27*   CREATININE 5.22*   < > 4.51*   < > 1.56* 1.34*   MG 1.4*  --  1.8  --   --   --     < > = values in this interval not displayed. Hepatic Function Panel:   Recent Labs     01/15/22  1335 01/15/22  1335 01/15/22  2345 01/16/22  0610   PROT 7.8  --   --  6.7   LABALBU 3.4*   < > 3.2* 3.0*   BILIDIR 0.17  --   --   --    IBILI 0.25  --   --   --    BILITOT 0.42  --   --  0.27*   ALKPHOS 163*  --   --  148*   ALT 12  --   --  11   AST 14  --   --  12    < > = values in this interval not displayed.          Lab Results   Component Value Date    PROCAL 0.34 01/15/2022 PROCAL 0.42 01/15/2022    PROCAL 2.33 10/21/2021     Lab Results   Component Value Date    CRP 98.8 01/15/2022    CRP 42.3 11/06/2020    CRP 74.3 08/20/2017     Lab Results   Component Value Date    SEDRATE 77 (H) 11/06/2020         Lab Results   Component Value Date    DDIMER 1.01 01/15/2022    DDIMER 2.49 05/04/2016     Lab Results   Component Value Date    FERRITIN 160 01/15/2022    FERRITIN 109 05/07/2016     Lab Results   Component Value Date     01/15/2022     No results found for: FIBRINOGEN    Results in Past 30 Days  Result Component Current Result Ref Range Previous Result Ref Range   SARS-CoV-2, Rapid Not Detected (1/15/2022) Not Detected Not in Time Range      Lab Results   Component Value Date    COVID19 Not Detected 01/15/2022    COVID19 Not Detected 11/04/2021    COVID19 Not Detected 10/21/2021       No results for input(s): [facility] in the last 72 hours. Imaging Studies:   ONE XRAY VIEW OF THE CHEST 1/17/2022 8:30 am  Impression   No radiologic evidence of acute cardiopulmonary disease.           RETROPERITONEAL ULTRASOUND OF THE KIDNEYS AND URINARY BLADDER 1/17/2022  Impression   Markedly limited assessment due to poor sonographic window.       No gross hydronephrosis.       Bladder not visualized.           Cultures:     Culture, Blood 1 [[id number]] Collected: 01/15/22 1700   Order Status: Completed Specimen: Blood Updated: 01/18/22 0112    Specimen Description . BLOOD    Special Requests L HAND 10ML    Culture NO GROWTH 3 DAYS   Culture, Blood 2 [[id number]] Collected: 01/15/22 1652   Order Status: Completed Specimen: Blood Updated: 01/18/22 0112    Specimen Description . BLOOD    Special Requests RAC 12ML    Culture NO GROWTH 3 DAYS       Culture, Urine [[id number]] (Abnormal)  Collected: 01/15/22 1455   Order Status: Completed Specimen: Urine, clean catch Updated: 01/17/22 1253    Specimen Description . CLEAN CATCH URINE    Special Requests NOT REPORTED    Culture PSEUDOMONAS AERUGINOSA >[id number] CFU/ML Abnormal      PRESUMPTIVE ID: GROUP D ENTEROCOCCUS >[id number] CFU/ML   Susceptibility     Pseudomonas aeruginosa     BACTERIAL SUSCEPTIBILITY PANEL SHAHNAZ (Preliminary)     amikacin NOT REPORTED       ceFAZolin NOT REPORTED       cefepime 2  Sensitive     ciprofloxacin 2  Intermediate     gentamicin 2  Sensitive     meropenem NOT REPORTED       piperacillin-tazobactam <=4  Sensitive     tigecycline NOT REPORTED       tobramycin <=1  Sensitive                COVID-19, Rapid [[id number]] Collected: 01/15/22 1335   Order Status: Completed Specimen: Nasopharyngeal Swab Updated: 01/15/22 1408    Specimen Description . NASOPHARYNGEAL SWAB    SARS-CoV-2, Rapid Not Detected    Comment:        Rapid NAAT:  The specimen is NEGATIVE for SARS-CoV-2, the novel coronavirus associated with   COVID-19.         The ID NOW COVID-19 assay is designed to detect the virus that causes COVID-19 in patients   with signs and symptoms of infection who are suspected of COVID-19. An individual without symptoms of COVID-19 and who is not shedding SARS-CoV-2 virus would   expect to have a negative (not detected) result in this assay. Negative results should be treated as presumptive and, if inconsistent with clinical signs   and symptoms or necessary for patient management,   should be tested with an alternative molecular assay.  Negative results do not preclude   SARS-CoV-2 infection and   should not be used as the sole basis for patient management decisions.         Fact sheet for Healthcare Providers: Tristan   Fact sheet for Patients: Tristan           Methodology: Isothermal Nucleic Acid Amplification             Medications:      midodrine  10 mg Oral TID    ipratropium-albuterol  1 ampule Inhalation 4x daily    methylPREDNISolone  40 mg IntraVENous Q12H    sodium chloride  1,000 mL IntraVENous Once    aspirin  81 mg Oral Daily    DULoxetine  60 mg Oral QAM    pantoprazole  40 mg Oral QAM AC    levothyroxine  175 mcg Oral Daily    oxybutynin  5 mg Oral BID    tamsulosin  0.4 mg Oral Daily    sodium chloride flush  5-40 mL IntraVENous 2 times per day    heparin (porcine)  5,000 Units SubCUTAneous 3 times per day    insulin lispro  0-6 Units SubCUTAneous TID WC    insulin lispro  0-3 Units SubCUTAneous Nightly    meropenem  500 mg IntraVENous Q12H           [facility]  Jason Hendrickson MD  [employer] messaging  OFFICE: [phone]      Electronically signed by Jason Hendrickson MD on 1/18/2022 at 10:51 AM  Thank you for allowing us to participate in the care of this patient. Please call with questions. This note iscreated with the assistance of a speech recognition program.  While intending to generate a document that actually reflects the content of the visit, the document can still have some errors including those of syntax andsound a like substitutions which may escape proof reading. In such instances, actual meaning can be extrapolated by contextual diversion.

## 2022-01-18 NOTE — PROGRESS NOTES
Pulmonary Critical Care Progress Note    Patient seen for the follow up of Sepsis with acute hypercapnic respiratory failure and septic shock ([address]     Subjective:    He is doing to room air. He is much more alert but seems to be slightly confused. He is anxious and r requests his Klonopin that he was taking at 1 mg twice daily at home. Cassandra Diana He is off BiPAP 24/8 30 percent O2. He has started oral intake but  has poor appetite and has been off Levophed. He is still on Precedex     examination:    Vitals: /78   Pulse 56   Temp 97.7 °F (36.5 °C) (Temporal)   Resp 21   Ht 6' (1.829 m)   Wt (!) 350 lb (158.8 kg)   SpO2 98%   BMI 47.47 kg/m²   SpO2  Av.3 %  Min: 82 %  Max: 100 %  General appearance: alert and cooperative with exam  Neck: No JVD  Lungs: decreased breath sounds no crackles  Heart: regular rate and rhythm, S1, S2 normal, no gallop  Abdomen: Soft, non tender, + BS  Extremities: no cyanosis or clubbing. No significant edema    LABs:    CBC:   Recent Labs     22  0358 22  0444   WBC 8.0 6.7   HGB 10.7* 10.3*   HCT 34.5* 33.9*    136*     BMP:   Recent Labs     22  1626 22  0444    142   K 4.5 5.0   CO2 26 24   BUN 28* 27*   CREATININE 1.56* 1.34*   LABGLOM 46* 55*   GLUCOSE 235* 209*     PT/INR:   Recent Labs     22  0610   PROTIME 12.5   INR 0.9     APTT:No results for input(s): APTT in the last 72 hours. LIVER PROFILE:  Recent Labs     01/15/22  2345 22  0610   AST  --  12   ALT  --  11   LABALBU 3.2* 3.0*       Radiology:    CXR     No radiologic evidence of acute cardiopulmonary disease.     Impression:  · Acute on chronic  hypoxic and hypercarbicrespiratory failure  · Atelectasis  · Sepsis/septic shock  · Obstructive sleep apnea/Obesity hypoventilation  · Encephalopathy/CO2 narcosis  · Chronic urinary tension indwelling Clemons   · acute renal failure/hepatorenal syndrome  · Liver cirrhosis/history alcoholism      Recommendations:  · Oxygen and BiPAP support  · Precedex as needed for agitation   · start Klonopin 0.5 twice daily  · Ativan as needed  · DuoNeb by nebulizer  · Solu-Medrol IV 40 every 12 hours  · Diet as tolerated  · monitor blood pressure off Levophed  · Meropenem per ID  · Check urine culture blood culture  · Nephrology consult/monitor urine output  · Discussed with urology Dr. Charmian Lefort  · Discussed with RN  · DVT prophylaxis       Terrell Riddle MD, MD, [place] [address] [facility]  Cell: [phone]  Office: [phone]

## 2022-01-18 NOTE — PROGRESS NOTES
Renal Progress Note    Patient :  Zenaida White; 61 y.o. MRN# [id number]  Location:  2031/2031-01  Attending:  Elizabeth Bettencourt MD  Admit Date:  1/15/2022   Hospital Day: 3      Subjective:     Patient was seen and examined. No new issues reported overnight. Still has positive confusion but oriented to self and place, and on BiPAP off/on. Patient is morbidly obese and chronic indwelling Clemons catheter, urine cultures 1/15/2022 are growing Pseudomonas aeruginosa and Enterococcus currently on IV antibiotics. CT scan showed nonobstructive right kidney stone and urology is on board. Serum creatinine did improve to 1.34 mg/dl. Urine output documented as about 2.2 L in the last 24 hours. On half normal saline at 75 cc an hour. Now off of pressor support. Outpatient Medications:     Medications Prior to Admission: bethanechol (URECHOLINE) 10 MG tablet, Take 10 mg by mouth 3 times daily  bumetanide (BUMEX) 2 MG tablet, Take 2 mg by mouth 2 times daily   fluocinonide (LIDEX) 0.05 % cream, Apply topically daily as needed (itching to groin)  ferrous sulfate (FE TABS 325) 325 (65 Fe) MG EC tablet, Take 325 mg by mouth daily (with breakfast)  glipiZIDE (GLUCOTROL) 10 MG tablet, Take 10 mg by mouth 2 times daily (before meals)  hydrOXYzine (ATARAX) 25 MG tablet, Take 25 mg by mouth every 8 hours as needed for Itching  clonazePAM (KLONOPIN) 1 MG tablet, Take 1 mg by mouth 2 times daily. levothyroxine (SYNTHROID) 200 MCG tablet, Take 200 mcg by mouth Daily  metFORMIN (GLUCOPHAGE) 500 MG tablet, Take 500 mg by mouth 2 times daily (with meals)  naloxegol (MOVANTIK) 25 MG TABS tablet, Take 25 mg by mouth every morning  nadolol (CORGARD) 40 MG tablet, Take 40 mg by mouth daily  oxyCODONE-acetaminophen (PERCOCET) 7.5-325 MG per tablet, Take 1 tablet by mouth every 6 hours as needed.    potassium chloride 20 MEQ/15ML (10%) oral solution, Take 20 mEq by mouth daily  rifaximin (XIFAXAN) 550 MG tablet, Take 550 mg by mouth 2 times daily  spironolactone (ALDACTONE) 25 MG tablet, Take 75 mg by mouth 2 times daily  venlafaxine (EFFEXOR XR) 150 MG extended release capsule, Take 150 mg by mouth daily  ibuprofen (ADVIL;MOTRIN) 600 MG tablet, Take 600 mg by mouth every 6 hours as needed for Pain  pregabalin (LYRICA) 150 MG capsule, Take 150 mg by mouth 2 times daily. SITagliptin (JANUVIA) 100 MG tablet, Take 100 mg by mouth daily  QUEtiapine (SEROQUEL XR) 50 MG extended release tablet, Take 50 mg by mouth nightly  diphenoxylate-atropine (LOMOTIL) 2.5-0.025 MG per tablet, Take 1 tablet by mouth 4 times daily as needed for Diarrhea. albuterol sulfate HFA (PROAIR HFA) 108 (90 Base) MCG/ACT inhaler, Inhale 2 puffs into the lungs every 6 hours as needed for Wheezing  tamsulosin (FLOMAX) 0.4 MG capsule, Take 1 capsule by mouth daily  oxybutynin (DITROPAN) 5 MG tablet, Take 1 tablet by mouth 2 times daily  aspirin 81 MG EC tablet, Take 1 tablet by mouth daily  levothyroxine (SYNTHROID) 175 MCG tablet, Take 175 mcg by mouth Daily   nystatin (MYCOSTATIN) 251857 UNIT/GM powder, Apply topically 2 times daily Indications: to abdominal folds, groin TO ABDOMINAL FOLDS, GROIN   esomeprazole (NEXIUM) 40 MG capsule, Take 40 mg by mouth 2 times daily   DULoxetine (CYMBALTA) 60 MG capsule, Take 60 mg by mouth every morning   insulin glargine (BASAGLAR KWIKPEN) 100 UNIT/ML injection pen, Inject 80 Units into the skin 2 times daily  [DISCONTINUED] clonazePAM (KLONOPIN) 0.5 MG tablet, Take 1 tablet by mouth 2 times daily for 3 days. losartan (COZAAR) 50 MG tablet, Take 1 tablet by mouth daily  [DISCONTINUED] clonazePAM (KLONOPIN) 0.5 MG tablet, Take 1 tablet by mouth 2 times daily for 3 days.   nystatin (MYCOSTATIN) 728333 UNIT/GM cream, Apply topically 2 times daily as needed (to skin folds)  [DISCONTINUED] triamcinolone (KENALOG) 0.025 % cream, Apply topically 2 times daily as needed  [DISCONTINUED] fluocinonide (LIDEX) 0.05 % external solution, Apply topically as needed (scalp itching)  [DISCONTINUED] ketoconazole (NIZORAL) 2 % shampoo, Apply topically Twice a Week    Current Medications:     Scheduled Meds:    midodrine  10 mg Oral TID    ipratropium-albuterol  1 ampule Inhalation 4x daily    methylPREDNISolone  40 mg IntraVENous Q12H    sodium chloride  1,000 mL IntraVENous Once    aspirin  81 mg Oral Daily    DULoxetine  60 mg Oral QAM    pantoprazole  40 mg Oral QAM AC    levothyroxine  175 mcg Oral Daily    oxybutynin  5 mg Oral BID    tamsulosin  0.4 mg Oral Daily    sodium chloride flush  5-40 mL IntraVENous 2 times per day    heparin (porcine)  5,000 Units SubCUTAneous 3 times per day    insulin lispro  0-6 Units SubCUTAneous TID WC    insulin lispro  0-3 Units SubCUTAneous Nightly    meropenem  500 mg IntraVENous Q12H     Continuous Infusions:    DOPamine Stopped (22 0850)    dexmedetomidine (PRECEDEX) IV infusion 0.3 mcg/kg/hr (22 1000)    sodium chloride 75 mL/hr at 22 2137    sodium chloride      sodium chloride      dextrose      norepinephrine Stopped (22 1315)     PRN Meds:  fentanNYL, LORazepam, sodium chloride, albuterol sulfate HFA, sodium chloride flush, sodium chloride, ondansetron **OR** ondansetron, acetaminophen **OR** acetaminophen, glucose, dextrose, glucagon (rDNA), dextrose    Input/Output:       I/O last 3 completed shifts: In: 2201.6 [I.V.:2201.6]  Out: 4500 [Urine:4500].       Patient Vitals for the past 96 hrs (Last 3 readings):   Weight   22 0400 (!) 350 lb (158.8 kg)   01/15/22 1341 (!) 360 lb (163.3 kg)       Vital Signs:   Temperature:  Temp: 97.1 °F (36.2 °C)  TMax:   Temp (24hrs), Av.9 °F (36.1 °C), Min:92.2 °F (33.4 °C), Max:97.8 °F (36.6 °C)    Respirations:  Resp: 16  Pulse:   Pulse: 53  BP:    BP: 139/61  BP Range: Systolic (22BON), SGY:233 , Min:94 , XCO:473       Diastolic (59NDA), MUR:76, Min:51, Max:135      Physical Examination:     General:  Positive confusion, oriented to self and place, currently not on BiPAP. HEENT: Atraumatic, normocephalic, no throat congestion, moist mucosa. Eyes:   Pupils equal, round and reactive to light, EOMI. Neck:   Supple  Chest:   Bilateral vesicular breath sounds, no rales or wheezes. Cardiac:  S1 S2 RR, no murmurs, gallops or rubs. Abdomen: Soft, non-tender, no masses or organomegaly, BS audible. :   No suprapubic or flank tenderness. Neuro:  Positive confusion, oriented to self and place, no NAD. SKIN:  No rashes, good skin turgor. Extremities:  No edema. Labs:       Recent Labs     01/16/22  0610 01/17/22  0358 01/18/22  0444   WBC 11.0 8.0 6.7   RBC 3.86* 4.01* 3.90*   HGB 10.4* 10.7* 10.3*   HCT 33.5* 34.5* 33.9*   MCV 86.8 86.0 86.9   MCH 26.9 26.7 26.4   MCHC 31.0 31.0 30.4   RDW 16.5* 16.4* 16.5*    157 136*   MPV 10.1 10.1 10.5      BMP:   Recent Labs     01/17/22  0358 01/17/22  1626 01/18/22  0444    140 142   K 4.6 4.5 5.0   * 106 108*   CO2 25 26 24   BUN 31* 28* 27*   CREATININE 2.00* 1.56* 1.34*   GLUCOSE 116* 235* 209*   CALCIUM 9.2 9.2 9.4      Phosphorus:     Recent Labs     01/15/22  2345   PHOS 5.6*     Magnesium:    Recent Labs     01/15/22  1724 01/15/22  2345 01/16/22  0610   MG 1.2* 1.4* 1.8     Albumin:    Recent Labs     01/15/22  1335 01/15/22  2345 01/16/22  0610   LABALBU 3.4* 3.2* 3.0*     BNP:      Lab Results   Component Value Date    BNP NOT REPORTED 06/18/2014     MARISA:      Lab Results   Component Value Date    MARISA NEGATIVE 11/03/2021     SPEP:  Lab Results   Component Value Date    PROT 6.7 01/16/2022    ALBCAL 3.3 11/03/2021    ALBPCT 51 11/03/2021    LABALPH 0.2 11/03/2021    LABALPH 0.6 11/03/2021    A1PCT 3 11/03/2021    A2PCT 9 11/03/2021    LABBETA 1.0 11/03/2021    BETAPCT 15 11/03/2021    GAMGLOB 1.5 11/03/2021    GGPCT 23 11/03/2021    PATH ELECTRONICALLY SIGNED.  Manuel Rico M.D. 01/15/2022     UPEP:     Lab Results   Component Value Date    LABPE  01/15/2022 ELEVATED PROTEIN CONCENTRATION. MOST SERUM PROTEINS ARE DETECTED IN THIS URINE. USUALLY OBSERVED WITH MARKEDLY INCREASED NON-SELECTIVE GLOMERULAR PERMEABILITY (i.e. SEVERE GLOMERULAR DISEASE), CONTAMINATION OF     C3:     Lab Results   Component Value Date    C3 94 11/03/2021     C4:     Lab Results   Component Value Date    C4 21 11/03/2021     Hep BsAg:         Lab Results   Component Value Date    HEPBSAG NONREACTIVE 06/19/2014     Hep C AB:          Lab Results   Component Value Date    HEPCAB NONREACTIVE 06/19/2014       Urinalysis/Chemistries:      Lab Results   Component Value Date    NITRU POSITIVE 01/15/2022    COLORU PADMA 01/15/2022    PHUR 5.0 01/15/2022    WBCUA TOO NUMEROUS TO COUNT 01/15/2022    RBCUA TOO NUMEROUS TO COUNT 01/15/2022    MUCUS NOT REPORTED 01/15/2022    TRICHOMONAS NOT REPORTED 01/15/2022    YEAST NOT REPORTED 01/15/2022    BACTERIA MANY 01/15/2022    CLARITYU slightly cloudy 05/04/2016    SPECGRAV 1.037 01/15/2022    LEUKOCYTESUR MOD 01/15/2022    UROBILINOGEN Normal 01/15/2022    BILIRUBINUR  01/15/2022     Presumptive positive. Unable to confirm due to unavailability of reagent. BILIRUBINUR neg 05/04/2016    BILIRUBINUR 3+ 08/30/2011    BLOODU small 05/04/2016    GLUCOSEU NEGATIVE 01/15/2022    GLUCOSEU 1+ 08/30/2011    KETUA 1+ 01/15/2022    AMORPHOUS NOT REPORTED 01/15/2022     Urine Sodium:     Lab Results   Component Value Date    TICO <20 01/15/2022     Urine Potassium:    Lab Results   Component Value Date    KUR 10.3 11/03/2021     Urine Chloride:    Lab Results   Component Value Date    CLUR <20 01/15/2022     Urine Osmolarity:   Lab Results   Component Value Date    OSMOU 302 01/15/2022      Urine Creatinine:     Lab Results   Component Value Date    LABCREA 94.2 11/03/2021     Radiology:     Reviewed. Assessment:     1.  Acute Kidney Injury: Secondary to ischemic ATN from hypotension low flow related intravascular volume depletion, secondary to loop diuretics and ARB use, from underlying systemic inflammation response with obstructive uropathy complicated by UTI. Baseline creatinine prior to October 21 was 0.9-1.1 range since then has been in the 1.4-1.5 range which is now up to 6.2 as peak creatinine this admission-currently improving. Has had multiple episodes of acute kidney injury in the last few months requiring hospitalization. 2. Shock secondary to intravascular volume depletion, currently on IV fluids resuscitation along with pressor support. 3.  Chronic Clemons with obstructive apathy. 4.  Hypotension requiring pressor support. 5.  UTI. 6.  Hyperchloremia. 7.  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 Morbid obesity  9. Sleep apnea with hypercapnia  10. Respiratory failure  11. Type 2 diabetes with complications  12. Alcoholic liver cirrhosis with portal hypertension  13. Encephalopathy, likely due to underlying infection. Plan:   1. Change IV fluids with half-normal saline at 50 cc an hour. 2.  Antibiotics as per infectious disease per renal function. 3.  Continue monitor strict I's and O's renal function. 4.  BMP in AM.  5.  Continue ProAmatine 10 mg 3 times a day, hold for SBP greater than 100.  6.  Will follow. Nutrition   Please ensure that patient is on a renal diet/TF. Avoid nephrotoxic drugs/contrast exposure. We will continue to follow along with you. Phong Ibarra MD  [facility]     This note is created with the assistance of a speech-recognition program. While intending to generate a document that actually reflects the content of the visit, no guarantees can be provided that every mistake has been identified and corrected by editing.

## 2022-01-18 NOTE — PLAN OF CARE
Problem: Skin Integrity:  Goal: Will show no infection signs and symptoms  Description: Will show no infection signs and symptoms  Outcome: Ongoing  Skin assessment complete. Pt turned and repositioned every two hours with assistance from staff. Area kept free from moisture. Proper nourishment and fluids encouraged, as appropriate. Redness noted to groin and buttocks. Will continue to monitor for additional needs and changes in skin breakdown. Problem: Falls - Risk of:  Goal: Will remain free from falls  Description: Will remain free from falls  Outcome: Ongoing     Problem: Non-Violent Restraints  Goal: Removal from restraints as soon as assessed to be safe  Outcome: Met This Shift   Pt out of restraints during shift.

## 2022-01-18 NOTE — PROGRESS NOTES
[facility]  Office: [address], DO, Travis Parker, DO, Allison Keller, DO, Elvie Horse Blood, DO, Alisa Varela MD, Michelle Curtis MD, Mirella Huerta MD, Cecilia Peralta MD, Edgardo Daigle MD, Jeovanny Jin MD, Braxton Frazier MD, Otoniel Morrell, DO, Jose Cash, DO, Cordelia Barthel, MD,  Zhou Jessica, DO, Julia Soni MD, Wes Slater MD, Hernan Payne MD, Cristy Siegel MD, Lorenzo Pruitt MD, Kaila Mariano MD, Leena Archuleta MD, Jennifer Lopes, [facility], [facility] Lisa, CNP, Marva Lewis, CNP, Rosa Mercedes, CNS, Madie Zurita, CNP, Mateus Conner, CNP, Delfino Butt, CNP, Anjelica Faulkner, CNP, Alexandra Hutchins, CNP, Jennifer Box PA-C, Lb Jhaveri, [facility], Singh Kat, [facility], Michael Hernandez, CNP, Dianna Barajas, CNP, Eugenia Paniagua, CNP, Barney Murray, CNP, José Antonio Dorsey, CNP, Dyana Cedeño, [facility]    Progress Note    1/18/2022    3:27 PM    Name:   Dung Aguilar  MRN:     [id number]     Acct:      [de-identified]   Room:   2031/2031-01   Day:  3  Admit Date:  1/15/2022  1:31 PM    PCP:   Jyothi Zimmerman MD  Code Status:  Full Code    Subjective:     C/C:   Chief Complaint   Patient presents with    Altered Mental Status     Interval History Status:   Patient is still confused, oriented to self and knows he is in hospital  He is on BiPAP off and on  Has chronic indwelling Clemons's catheter, urine culture growing Pseudomonas and Enterococcus, currently getting IV antibiotics  CT scan showed nonobstructive right kidney stone-urology evaluating  Serum creatinine improved to 1.34  Currently getting half-normal saline at 75 mL/h  Is off pressor support    Brief History:   Per my MARK:  \"Ruben Dimas is a 61 y.o. Non- / non  male who presents with Altered Mental Status   and is admitted to the hospital for the management of Sepsis ([facility] [address]      Per the ER note Patient is a 48-year-old male with a history of CHF and COPD who presented to the emergency department by EMS from home secondary to altered mental status.  History as per EMS who stated sinus pain and altered throughout the day no history of trauma or fall however when EMS arrived patient was alert answering questions appropriately and then fell asleep.  Patient stated he is on oxygen but is not used at all times.  States that he is vaccinated; no known COVID exposure.  Denied any trauma or injury.  Patient denied difficulty speaking or weakness.  Patient denied chest pain, shortness of breath, nausea, vomiting, fevers or chills     Patient was seen in our ER on 1/12/2022 related to dysuria.  According to the notes his Clemons catheter was changed out. Mak Hughes document no urinary retention after replacement of Clemons.  At that visit his BUN and creatinine were 27/3. 71.  The ER note from the 12th also notes a blood pressure of 85/50 with a pulse of 66.  Patient told the ER staff that he has been running low.  Patient was discharged.  TKLLL the patient's bun/creatinine is 46/ 6. 24.  On review of his chart he was hospitalized in November 2021 with a creatinine of 4.62 that trended down to 1.8 after receiving fluids. Daisy Nguyen 2021 the patient was admitted with pneumonia.  According to the notes the patient developed LEONARDA from overdiuresis, ischemic ATN secondary to hypotension.  At that time his creatinine bumped to 2.72 with fluids recovered to 1.48      Review of Systems:     Unable to obtain, patient is confused    Medications: Allergies:     Allergies   Allergen Reactions    No Known Allergies        Current Meds:   Scheduled Meds:    midodrine  10 mg Oral TID    ipratropium-albuterol  1 ampule Inhalation 4x daily    methylPREDNISolone  40 mg IntraVENous Q12H    sodium chloride  1,000 mL IntraVENous Once    aspirin  81 mg Oral Daily    DULoxetine  60 mg Oral QAM    pantoprazole  40 mg Oral QAM AC    levothyroxine  175 mcg Oral Daily    oxybutynin  5 mg Oral BID    has never smoked. He has never used smokeless tobacco. He reports that he does not drink alcohol and does not use drugs. Family History:   Family History   Adopted: Yes   Problem Relation Age of Onset    No Known Problems Mother         Adopted    No Known Problems Father         Adopted       Vitals:  /78   Pulse 56   Temp 97.7 °F (36.5 °C) (Temporal)   Resp 21   Ht 6' (1.829 m)   Wt (!) 350 lb (158.8 kg)   SpO2 98%   BMI 47.47 kg/m²   Temp (24hrs), Av.9 °F (36.1 °C), Min:92.2 °F (33.4 °C), Max:97.7 °F (36.5 °C)    Recent Labs     22  2316 22  0357 22  0733 22  1109   POCGLU 184* 191* 180* 203*       I/O (24Hr):     Intake/Output Summary (Last 24 hours) at 2022 1527  Last data filed at 2022 1100  Gross per 24 hour   Intake 4794.1 ml   Output 1775 ml   Net 3019.1 ml       Labs:  Hematology:  Recent Labs     22  0610 22  0358 22  0444   WBC 11.0 8.0 6.7   RBC 3.86* 4.01* 3.90*   HGB 10.4* 10.7* 10.3*   HCT 33.5* 34.5* 33.9*   MCV 86.8 86.0 86.9   MCH 26.9 26.7 26.4   MCHC 31.0 31.0 30.4   RDW 16.5* 16.4* 16.5*    157 136*   MPV 10.1 10.1 10.5   INR 0.9  --   --      Chemistry:  Recent Labs     01/15/22  1724 01/15/22  2345 22  0117 22  0610 22  0610 22  0358 22  1626 22  0444   NA  --  134*   < > 137   < > 144 140 142   K  --  4.2   < > 4.1   < > 4.6 4.5 5.0   CL  --  98   < > 100   < > 108* 106 108*   CO2  --  24   < > 24   < > 25 26 24   GLUCOSE  --  105*   < > 173*   < > 116* 235* 209*   BUN  --  46*   < > 42*   < > 31* 28* 27*   CREATININE  --  5.22*   < > 4.51*   < > 2.00* 1.56* 1.34*   MG 1.2* 1.4*  --  1.8  --   --   --   --    ANIONGAP  --  12   < > 13   < > 11 8* 10   LABGLOM  --  11*   < > 14*   < > 35* 46* 55*   GFRAA  --  14*   < > 16*   < > 42* 56* >60   CALCIUM  --  7.8*   < > 8.1*   < > 9.2 9.2 9.4   PHOS  --  5.6*  --   --   --   --   --   --    PROBNP  --   --   --   --   --  2,099* --   --    TROPHS  --   --   --   --   --  23*  --   --     < > = values in this interval not displayed. Recent Labs     01/15/22  2345 01/16/22  0115 01/16/22  0610 01/16/22  0749 01/17/22  1111 01/17/22  2103 01/17/22  2316 01/18/22  0357 01/18/22  0733 01/18/22  1109   PROT  --   --  6.7  --   --   --   --   --   --   --    LABALBU 3.2*  --  3.0*  --   --   --   --   --   --   --    TSH 1.95  --   --   --   --   --   --   --   --   --    AST  --   --  12  --   --   --   --   --   --   --    ALT  --   --  11  --   --   --   --   --   --   --    ALKPHOS  --   --  148*  --   --   --   --   --   --   --    BILITOT  --   --  0.27*  --   --   --   --   --   --   --    POCGLU  --    < >  --    < > 204* 200* 184* 191* 180* 203*    < > = values in this interval not displayed. ABG:  Lab Results   Component Value Date    POCPH 7.353 01/17/2022    PHART 7.330 06/18/2014    POCPCO2 51.5 01/17/2022    GYQ8XOW 68.0 06/18/2014    POCPO2 82.2 01/17/2022    PO2ART 84.0 06/18/2014    POCHCO3 28.6 01/17/2022    WMR8PCV 34.9 06/18/2014    NBEA NOT REPORTED 01/17/2022    PBEA 2 01/17/2022    SOU7AXS NOT REPORTED 01/17/2022    JXYB6OED 95 01/17/2022    G8LNKFJN 96.5 06/18/2014    FIO2 28.0 01/17/2022     Lab Results   Component Value Date/Time    SPECIAL L HAND 10ML 01/15/2022 05:00 PM     Lab Results   Component Value Date/Time    CULTURE NO GROWTH 3 DAYS 01/15/2022 05:00 PM       Radiology:  CT ABDOMEN PELVIS WO CONTRAST Additional Contrast? None    Result Date: 1/15/2022  1. Right nonobstructive nephrolithiasis. 2. Cirrhotic liver morphology with sequela of portal hypertension. Consider nonemergent liver MRI with Eovist for [facility] [address] screening. CT Head WO Contrast    Result Date: 1/15/2022  No acute intracranial abnormality. Senescent changes including mild cortical atrophy. NM LUNG SCAN PERFUSION ONLY    Result Date: 1/15/2022  Low Probability for Pulmonary Embolus.      XR CHEST PORTABLE    Result Date: 1/17/2022  No radiologic evidence of acute cardiopulmonary disease. XR CHEST PORTABLE    Result Date: 1/15/2022  Hypoinflated lungs. Cardiomegaly again seen, when compared to the previous study performed 11/02/2021. No acute consolidation or infiltrate. Probable right lung base atelectasis. US RETROPERITONEAL COMPLETE    Result Date: 1/17/2022  Markedly limited assessment due to poor sonographic window. No gross hydronephrosis. Bladder not visualized.        Physical Examination:        General appearance:  alert, obese, no distress  Mental Status: Confused, oriented to self only  Lungs:  clear to auscultation bilaterally, normal effort  Heart:  regular rate and rhythm, no murmur  Abdomen:  soft, nontender, nondistended, normal bowel sounds, no masses, hepatomegaly, splenomegaly  Extremities:  no edema, redness, tenderness in the calves  Skin:  + Venous stasis changes both lower extremities    Assessment:        Hospital Problems           Last Modified POA    * (Principal) Sepsis with acute hypercapnic respiratory failure and septic shock ([address] 8/67/3467 Yes    Alcoholic cirrhosis of liver ([address] sober since 2013 1/15/2022 Yes    Bipolar disorder ([address] 1/15/2022 Yes    Type 2 diabetes mellitus with diabetic neuropathy, without long-term current use of insulin ([address] 1/15/2022 Yes    Essential hypertension, currently hypotensive 1/15/2022 Yes    FABI (obstructive sleep apnea) 1/15/2022 Yes    Type 2 diabetes mellitus with diabetic neuropathy (HCC) (Chronic) 1/15/2022 Yes    Acquired hypothyroidism 1/15/2022 Yes    Venous stasis dermatitis of both lower extremities (Chronic) 1/15/2022 Yes    Chronic indwelling Clemons catheter (Chronic) 1/15/2022 Yes    BPH (benign prostatic hyperplasia) 1/15/2022 Yes    Chronic diastolic congestive heart failure ([address] 1/16/2022 Yes    On home oxygen therapy 1/15/2022 Yes    Overview Signed 11/4/2020  2:08 PM by Joanne Tillman APRN - NP     prn         Urinary bladder neurogenic dysfunction 1/15/2022 Yes    Urinary tract infection associated with indwelling urethral catheter ([facility] [address] 1/15/2022 Yes    Acute kidney injury superimposed on chronic kidney disease ([facility] [address] 1/15/2022 Yes    Somnolence 1/15/2022 Yes    Acute respiratory acidosis 1/16/2022 Yes          Plan:        1. Continue IV meropenem in consultation with ID  2. BiPAP as needed if he cooperates  3. Monitor renal function, nephrology following  4. Neurology also evaluated the patient  5.  Keep indwelling catheter, patient will need cystoscopy eventually    Tanja Palafox MD  1/18/2022  3:27 PM

## 2022-01-19 LAB
ANION GAP SERPL CALCULATED.3IONS-SCNC: 8 MMOL/L (ref 9–17)
BUN BLDV-MCNC: 25 MG/DL (ref 6–20)
BUN/CREAT BLD: 23 (ref 9–20)
CALCIUM SERPL-MCNC: 8.2 MG/DL (ref 8.6–10.4)
CHLORIDE BLD-SCNC: 102 MMOL/L (ref 98–107)
CO2: 24 MMOL/L (ref 20–31)
CREAT SERPL-MCNC: 1.08 MG/DL (ref 0.7–1.2)
GFR AFRICAN AMERICAN: >60 ML/MIN
GFR NON-AFRICAN AMERICAN: >60 ML/MIN
GFR SERPL CREATININE-BSD FRML MDRD: ABNORMAL ML/MIN/{1.73_M2}
GFR SERPL CREATININE-BSD FRML MDRD: ABNORMAL ML/MIN/{1.73_M2}
GLUCOSE BLD-MCNC: 198 MG/DL (ref 75–110)
GLUCOSE BLD-MCNC: 199 MG/DL (ref 70–99)
GLUCOSE BLD-MCNC: 200 MG/DL (ref 75–110)
GLUCOSE BLD-MCNC: 233 MG/DL (ref 75–110)
GLUCOSE BLD-MCNC: 235 MG/DL (ref 75–110)
GLUCOSE BLD-MCNC: 287 MG/DL (ref 75–110)
HCT VFR BLD CALC: 28.9 % (ref 40.7–50.3)
HEMOGLOBIN: 8.9 G/DL (ref 13–17)
MCH RBC QN AUTO: 26.3 PG (ref 25.2–33.5)
MCHC RBC AUTO-ENTMCNC: 30.8 G/DL (ref 28.4–34.8)
MCV RBC AUTO: 85.3 FL (ref 82.6–102.9)
NRBC AUTOMATED: 0 PER 100 WBC
PDW BLD-RTO: 16.7 % (ref 11.8–14.4)
PLATELET # BLD: 123 K/UL (ref 138–453)
PMV BLD AUTO: 10.2 FL (ref 8.1–13.5)
POTASSIUM SERPL-SCNC: 4.7 MMOL/L (ref 3.7–5.3)
RBC # BLD: 3.39 M/UL (ref 4.21–5.77)
SODIUM BLD-SCNC: 134 MMOL/L (ref 135–144)
WBC # BLD: 7.9 K/UL (ref 3.5–11.3)

## 2022-01-19 PROCEDURE — 6370000000 HC RX 637 (ALT 250 FOR IP): Performed by: INTERNAL MEDICINE

## 2022-01-19 PROCEDURE — 6360000002 HC RX W HCPCS: Performed by: NURSE PRACTITIONER

## 2022-01-19 PROCEDURE — 94660 CPAP INITIATION&MGMT: CPT

## 2022-01-19 PROCEDURE — 6360000002 HC RX W HCPCS: Performed by: INTERNAL MEDICINE

## 2022-01-19 PROCEDURE — 2580000003 HC RX 258: Performed by: NURSE PRACTITIONER

## 2022-01-19 PROCEDURE — 36415 COLL VENOUS BLD VENIPUNCTURE: CPT

## 2022-01-19 PROCEDURE — 99232 SBSQ HOSP IP/OBS MODERATE 35: CPT | Performed by: INTERNAL MEDICINE

## 2022-01-19 PROCEDURE — 6370000000 HC RX 637 (ALT 250 FOR IP): Performed by: NURSE PRACTITIONER

## 2022-01-19 PROCEDURE — 2000000000 HC ICU R&B

## 2022-01-19 PROCEDURE — 94640 AIRWAY INHALATION TREATMENT: CPT

## 2022-01-19 PROCEDURE — 85027 COMPLETE CBC AUTOMATED: CPT

## 2022-01-19 PROCEDURE — 94761 N-INVAS EAR/PLS OXIMETRY MLT: CPT

## 2022-01-19 PROCEDURE — 80048 BASIC METABOLIC PNL TOTAL CA: CPT

## 2022-01-19 PROCEDURE — 99232 SBSQ HOSP IP/OBS MODERATE 35: CPT | Performed by: NURSE PRACTITIONER

## 2022-01-19 PROCEDURE — 2700000000 HC OXYGEN THERAPY PER DAY

## 2022-01-19 PROCEDURE — 2580000003 HC RX 258: Performed by: INTERNAL MEDICINE

## 2022-01-19 PROCEDURE — 82947 ASSAY GLUCOSE BLOOD QUANT: CPT

## 2022-01-19 RX ORDER — TORSEMIDE 10 MG/1
10 TABLET ORAL DAILY
Status: DISCONTINUED | OUTPATIENT
Start: 2022-01-20 | End: 2022-01-21 | Stop reason: HOSPADM

## 2022-01-19 RX ORDER — PREDNISONE 20 MG/1
40 TABLET ORAL DAILY
Status: DISCONTINUED | OUTPATIENT
Start: 2022-01-20 | End: 2022-01-21 | Stop reason: HOSPADM

## 2022-01-19 RX ADMIN — LORAZEPAM 1 MG: 2 INJECTION INTRAMUSCULAR; INTRAVENOUS at 23:45

## 2022-01-19 RX ADMIN — LORAZEPAM 1 MG: 2 INJECTION INTRAMUSCULAR; INTRAVENOUS at 13:08

## 2022-01-19 RX ADMIN — MEROPENEM 500 MG: 500 INJECTION, POWDER, FOR SOLUTION INTRAVENOUS at 18:08

## 2022-01-19 RX ADMIN — TAMSULOSIN HYDROCHLORIDE 0.4 MG: 0.4 CAPSULE ORAL at 09:16

## 2022-01-19 RX ADMIN — PANTOPRAZOLE SODIUM 40 MG: 40 TABLET, DELAYED RELEASE ORAL at 05:54

## 2022-01-19 RX ADMIN — IPRATROPIUM BROMIDE AND ALBUTEROL SULFATE 1 AMPULE: .5; 2.5 SOLUTION RESPIRATORY (INHALATION) at 10:39

## 2022-01-19 RX ADMIN — ACETAMINOPHEN 650 MG: 325 TABLET ORAL at 23:45

## 2022-01-19 RX ADMIN — DULOXETINE HYDROCHLORIDE 60 MG: 60 CAPSULE, DELAYED RELEASE ORAL at 09:15

## 2022-01-19 RX ADMIN — CLONAZEPAM 0.5 MG: 0.5 TABLET ORAL at 09:16

## 2022-01-19 RX ADMIN — OXYBUTYNIN CHLORIDE 5 MG: 5 TABLET ORAL at 20:58

## 2022-01-19 RX ADMIN — SODIUM CHLORIDE: 4.5 INJECTION, SOLUTION INTRAVENOUS at 07:16

## 2022-01-19 RX ADMIN — HEPARIN SODIUM 5000 UNITS: 5000 INJECTION INTRAVENOUS; SUBCUTANEOUS at 20:58

## 2022-01-19 RX ADMIN — METHYLPREDNISOLONE SODIUM SUCCINATE 40 MG: 40 INJECTION, POWDER, FOR SOLUTION INTRAMUSCULAR; INTRAVENOUS at 13:01

## 2022-01-19 RX ADMIN — SODIUM CHLORIDE, PRESERVATIVE FREE 10 ML: 5 INJECTION INTRAVENOUS at 10:01

## 2022-01-19 RX ADMIN — ASPIRIN 81 MG: 81 TABLET, COATED ORAL at 09:15

## 2022-01-19 RX ADMIN — IPRATROPIUM BROMIDE AND ALBUTEROL SULFATE 1 AMPULE: .5; 2.5 SOLUTION RESPIRATORY (INHALATION) at 08:10

## 2022-01-19 RX ADMIN — LORAZEPAM 1 MG: 2 INJECTION INTRAMUSCULAR; INTRAVENOUS at 03:35

## 2022-01-19 RX ADMIN — FENTANYL CITRATE 25 MCG: 50 INJECTION, SOLUTION INTRAMUSCULAR; INTRAVENOUS at 23:45

## 2022-01-19 RX ADMIN — MEROPENEM 500 MG: 500 INJECTION, POWDER, FOR SOLUTION INTRAVENOUS at 05:57

## 2022-01-19 RX ADMIN — IPRATROPIUM BROMIDE AND ALBUTEROL SULFATE 1 AMPULE: .5; 2.5 SOLUTION RESPIRATORY (INHALATION) at 15:06

## 2022-01-19 RX ADMIN — HEPARIN SODIUM 5000 UNITS: 5000 INJECTION INTRAVENOUS; SUBCUTANEOUS at 05:54

## 2022-01-19 RX ADMIN — INSULIN LISPRO 1 UNITS: 100 INJECTION, SOLUTION INTRAVENOUS; SUBCUTANEOUS at 20:59

## 2022-01-19 RX ADMIN — IPRATROPIUM BROMIDE AND ALBUTEROL SULFATE 1 AMPULE: .5; 2.5 SOLUTION RESPIRATORY (INHALATION) at 21:21

## 2022-01-19 RX ADMIN — INSULIN LISPRO 2 UNITS: 100 INJECTION, SOLUTION INTRAVENOUS; SUBCUTANEOUS at 13:01

## 2022-01-19 RX ADMIN — FENTANYL CITRATE 25 MCG: 50 INJECTION, SOLUTION INTRAMUSCULAR; INTRAVENOUS at 13:07

## 2022-01-19 RX ADMIN — INSULIN LISPRO 2 UNITS: 100 INJECTION, SOLUTION INTRAVENOUS; SUBCUTANEOUS at 09:14

## 2022-01-19 RX ADMIN — INSULIN LISPRO 1 UNITS: 100 INJECTION, SOLUTION INTRAVENOUS; SUBCUTANEOUS at 18:02

## 2022-01-19 RX ADMIN — OXYBUTYNIN CHLORIDE 5 MG: 5 TABLET ORAL at 09:16

## 2022-01-19 RX ADMIN — CLONAZEPAM 0.5 MG: 0.5 TABLET ORAL at 20:58

## 2022-01-19 RX ADMIN — HEPARIN SODIUM 5000 UNITS: 5000 INJECTION INTRAVENOUS; SUBCUTANEOUS at 13:08

## 2022-01-19 RX ADMIN — LEVOTHYROXINE SODIUM 175 MCG: 0.17 TABLET ORAL at 05:54

## 2022-01-19 ASSESSMENT — PAIN DESCRIPTION - ORIENTATION: ORIENTATION: RIGHT;LEFT

## 2022-01-19 ASSESSMENT — PAIN SCALES - GENERAL
PAINLEVEL_OUTOF10: 5
PAINLEVEL_OUTOF10: 0

## 2022-01-19 ASSESSMENT — ENCOUNTER SYMPTOMS
GASTROINTESTINAL NEGATIVE: 1
RESPIRATORY NEGATIVE: 1

## 2022-01-19 ASSESSMENT — PAIN DESCRIPTION - LOCATION: LOCATION: FOOT

## 2022-01-19 ASSESSMENT — PAIN DESCRIPTION - PAIN TYPE: TYPE: CHRONIC PAIN

## 2022-01-19 ASSESSMENT — PAIN DESCRIPTION - FREQUENCY: FREQUENCY: CONTINUOUS

## 2022-01-19 NOTE — PROGRESS NOTES
[facility]  Progress Note    Carrolyn Siemens  MRN: [id number]  Date: 1/19/2022  LOS: 4     Reason for F/U :   UTI    Impression :   1. Encephalopathy, likely toxic metabolic  2. Acute on chronic respiratory failure-hypercapnic  · On chronic home O2 therapy  · He has been on BiPAP  3. Morbid obesity with obstructive sleep apnea  4. Sepsis with concern for septic shock  5. Alcoholic cirrhosis  6. Diabetes mellitus type 2 with associated diabetic neuropathy  7. Essential hypertension  8. Venous stasis dermatitis of lower extremities-chronic  9. Urinary retention with a chronic indwelling Clemons catheter and concern for catheter associated urinary tract infection  · Urine culture with Pseudomonas aeruginosa and Enterococcus species, unclear if these are pathogens or colonizers  10. Diastolic congestive heart failure    Recommendations:   · Patient continues on meropenem, started 1/15/2022  · Plan will be to continue a 7-day course, through 1/21/2022  · Continue supportive care    Infection Control Recommendations:   Universal precautions    Discharge Planning:   Estimated Length of IV antimicrobials: 7 days, 1/21/2022  Patient will need Midline Catheter Insertion/ PICC line Insertion: No  Patient will need: Home IV , [place],  SNF,  LTAC: Undetermined  Patient willneed outpatient wound care: No    Medical Decision making / Summary of Stay:   Francois Oseguera a 61y.o.-year-old male who was initially admitted on 1/15/2022.  Ruben has a history of morbid obesity with a BMI of 57, diabetes mellitus type 2, essential hypertension, congestive heart failure, urinary retention and has an indwelling Clemons catheter, chronic pancreatitis, among other medical problems. The patient has been hospitalized multiple times and he presented to the hospital due to altered mental status. The patient apparently was answering questions appropriately when EMS arrived but subsequently fell asleep.   He is on home oxygen and in the emergency department the patient was confused. Work-up did include a CT scan of the abdomen pelvis that showed right-sided nonobstructive for lithiasis, cirrhotic liver morphology with sequelae of portal hypertension. A CT of the head showed no acute intracranial abnormality. VQ scan showed low probability for pulmonary embolism  Chest x-ray showed hypoinflated lungs with cardiomegaly again seen no acute consolidation or infiltrate and probable right lung base atelectasis. The patient was admitted for acute encephalopathy, acute kidney injury, and concern for urinary tract infection.     The patient is on BiPAP at 30% FiO2. He has been quite agitated is actually restrained at the wrist bilaterally and is on Precedex drip. Current evaluation:2022    BP (!) 149/67   Pulse 62   Temp 98.9 °F (37.2 °C) (Temporal)   Resp 17   Ht 6' (1.829 m)   Wt (!) 350 lb (158.8 kg)   SpO2 96%   BMI 47.47 kg/m²     Temperature Range: Temp: 98.9 °F (37.2 °C) Temp  Av.4 °F (36.9 °C)  Min: 97.7 °F (36.5 °C)  Max: 98.9 °F (37.2 °C)    Patient was seen and evaluated bedside, states he feels well, is just anxious and antsy just lying in the bed    Review of Systems   Constitutional: Negative. HENT: Negative. Respiratory: Negative. Cardiovascular: Negative. Gastrointestinal: Negative. Genitourinary: Negative. Musculoskeletal: Negative. Skin: Negative. Neurological: Negative. Psychiatric/Behavioral: The patient is nervous/anxious. Physical Examination :     Physical Exam  Constitutional:       General: He is not in acute distress. Appearance: Normal appearance. He is obese. HENT:      Head: Normocephalic and atraumatic. Cardiovascular:      Rate and Rhythm: Normal rate and regular rhythm. Pulmonary:      Effort: Pulmonary effort is normal. No respiratory distress. Breath sounds: Normal breath sounds. Abdominal:      General: Abdomen is flat.  Bowel sounds are normal.      Palpations: Abdomen is soft. Musculoskeletal:         General: Normal range of motion. Skin:     General: Skin is warm and dry. Coloration: Skin is not jaundiced. Neurological:      General: No focal deficit present. Mental Status: He is alert and oriented to person, place, and time. Mental status is at baseline. Laboratory data:   I have independently reviewed the followinglabs:  CBC with Differential:   Recent Labs     01/18/22  0444 01/19/22 0317   WBC 6.7 7.9   HGB 10.3* 8.9*   HCT 33.9* 28.9*   * 123*     BMP:   Recent Labs     01/18/22  0444 01/19/22  0317    134*   K 5.0 4.7   * 102   CO2 24 24   BUN 27* 25*   CREATININE 1.34* 1.08     Hepatic Function Panel: No results for input(s): PROT, LABALBU, BILIDIR, IBILI, BILITOT, ALKPHOS, ALT, AST in the last 72 hours. Lab Results   Component Value Date    PROCAL 0.34 01/15/2022    PROCAL 0.42 01/15/2022    PROCAL 2.33 10/21/2021     Lab Results   Component Value Date    CRP 98.8 01/15/2022    CRP 42.3 11/06/2020    CRP 74.3 08/20/2017     Lab Results   Component Value Date    SEDRATE 77 (H) 11/06/2020         Lab Results   Component Value Date    DDIMER 1.01 01/15/2022    DDIMER 2.49 05/04/2016     Lab Results   Component Value Date    FERRITIN 160 01/15/2022    FERRITIN 109 05/07/2016     Lab Results   Component Value Date     01/15/2022     No results found for: FIBRINOGEN    Results in Past 30 Days  Result Component Current Result Ref Range Previous Result Ref Range   SARS-CoV-2, Rapid Not Detected (1/15/2022) Not Detected Not in Time Range      Lab Results   Component Value Date    COVID19 Not Detected 01/15/2022    COVID19 Not Detected 11/04/2021    COVID19 Not Detected 10/21/2021       No results for input(s): VANCOTROUGH in the last 72 hours.     Imaging Studies:     No new imaging    Cultures:     Culture, Blood 1 [[id number]] Collected: 01/15/22 1700   Order Status: Completed Specimen: Blood Updated: 01/18/22 0112    Specimen Description . BLOOD    Special Requests L HAND 10ML    Culture NO GROWTH 3 DAYS   Culture, Blood 2 [[id number]] Collected: 01/15/22 1652   Order Status: Completed Specimen: Blood Updated: 01/18/22 0112    Specimen Description . BLOOD    Special Requests RAC 12ML    Culture NO GROWTH 3 DAYS   Culture, Urine [[id number]] (Abnormal)  Collected: 01/15/22 1455   Order Status: Completed Specimen: Urine, clean catch Updated: 01/17/22 1259    Specimen Description . CLEAN CATCH URINE    Special Requests NOT REPORTED    Culture PSEUDOMONAS AERUGINOSA >[id number] CFU/ML Abnormal      ENTEROCOCCUS FAECALIS >[id number] CFU/ML Abnormal          Medications:      clonazePAM  0.5 mg Oral BID    midodrine  10 mg Oral TID    ipratropium-albuterol  1 ampule Inhalation 4x daily    methylPREDNISolone  40 mg IntraVENous Q12H    sodium chloride  1,000 mL IntraVENous Once    aspirin  81 mg Oral Daily    DULoxetine  60 mg Oral QAM    pantoprazole  40 mg Oral QAM AC    levothyroxine  175 mcg Oral Daily    oxybutynin  5 mg Oral BID    tamsulosin  0.4 mg Oral Daily    sodium chloride flush  5-40 mL IntraVENous 2 times per day    heparin (porcine)  5,000 Units SubCUTAneous 3 times per day    insulin lispro  0-6 Units SubCUTAneous TID WC    insulin lispro  0-3 Units SubCUTAneous Nightly    meropenem  500 mg IntraVENous Q12H           [facility]  SUNSHINE Simons CNP  [employer]  OFFICE: [phone]      Electronically signed by SUNSHINE Simons CNP on 1/19/2022 at 10:21 AM  Thank you for allowing us to participate in the care of this patient. Please call with questions.     This note iscreated with the assistance of a speech recognition program.  While intending to generate a document that actually reflects the content of the visit, the document can still have some errors including those of syntax andsound a like substitutions which may escape proof reading. In such instances, actual meaning can be extrapolated by contextual diversion.

## 2022-01-19 NOTE — PROGRESS NOTES
Nephrology Progress Note      SUBJECTIVE       Pt was seen and examined. No acute issues overnite. Stable hemodynamics . Patient denies any nausea, vomiting, fever  Serum creatinine is down to 1.0. Urine output decent last 24 hours. He is still on IV fluids at 50 mL an hour. Because of issues with urinary retention and chronic indwelling Clemons, urology is following the patient. His urine cultures have grown Pseudomonas as well as Enterococcus and is receiving IV antibiotics accordingly. Imaging studies did not show any hydronephrosis, has nonobstructive right kidney stone. Currently not on any pressors    OBJECTIVE      CURRENT TEMPERATURE:  Temp: 98.7 °F (37.1 °C)  MAXIMUM TEMPERATURE OVER 24HRS:  Temp (24hrs), Av.5 °F (36.9 °C), Min:97.9 °F (36.6 °C), Max:98.9 °F (37.2 °C)    CURRENT RESPIRATORY RATE:  Resp: 24  CURRENT PULSE:  Pulse: 74  CURRENT BLOOD PRESSURE:  BP: (!) 150/67  24HR BLOOD PRESSURE RANGE:  Systolic (55EUW), YKS:812 , Min:123 , RHE:490   ; Diastolic (33QZC), TI, Min:50, Max:83    24HR INTAKE/OUTPUT:      Intake/Output Summary (Last 24 hours) at 2022 1310  Last data filed at 2022 0516  Gross per 24 hour   Intake 600 ml   Output 1135 ml   Net -535 ml     WEIGHT :  Patient Vitals for the past 96 hrs (Last 3 readings):   Weight   22 0400 (!) 350 lb (158.8 kg)   01/15/22 1341 (!) 360 lb (163.3 kg)     PHYSICAL EXAM      GENERAL APPEARANCE:Awake, alert, in no acute distress  SKIN: warm and dry, no rash or erythema  EYES: conjunctivae pale and sclera anicteric  ENT: no thrush no pharyngeal congestion   NECK:    No carotid bruits and no carotid lymphadenopathy . PULMONARY: lungs are diminished on auscultation. No Wheezing, no ronchi . CADRDIOVASCULAR: S1 and S2 normal NO S3 and NO S4 . No rubs , no murmur. ABDOMEN: soft nontender, bowel sounds present, no organomegaly, no ascites.      EXTREMITIES: + edema     CURRENT MEDICATIONS      DOPamine (INTROPIN) 400 mg in dextrose 5 % 250 mL infusion, Continuous  clonazePAM (KLONOPIN) tablet 0.5 mg, BID  fentaNYL (SUBLIMAZE) injection 25 mcg, Q1H PRN  LORazepam (ATIVAN) injection 1 mg, Q4H PRN  dexmedetomidine (PRECEDEX) 1,000 mcg in sodium chloride 0.9 % 250 mL infusion, Continuous  0.45 % sodium chloride infusion, Continuous  midodrine (PROAMATINE) tablet 10 mg, TID  ipratropium-albuterol (DUONEB) nebulizer solution 1 ampule, 4x daily  methylPREDNISolone sodium (SOLU-MEDROL) injection 40 mg, Q12H  0.9 % sodium chloride bolus, Once  0.9 % sodium chloride infusion, PRN  albuterol sulfate  (90 Base) MCG/ACT inhaler 2 puff, Q6H PRN  aspirin EC tablet 81 mg, Daily  DULoxetine (CYMBALTA) extended release capsule 60 mg, QAM  pantoprazole (PROTONIX) tablet 40 mg, QAM AC  levothyroxine (SYNTHROID) tablet 175 mcg, Daily  oxybutynin (DITROPAN) tablet 5 mg, BID  tamsulosin (FLOMAX) capsule 0.4 mg, Daily  sodium chloride flush 0.9 % injection 5-40 mL, 2 times per day  sodium chloride flush 0.9 % injection 5-40 mL, PRN  0.9 % sodium chloride infusion, PRN  ondansetron (ZOFRAN-ODT) disintegrating tablet 4 mg, Q8H PRN   Or  ondansetron (ZOFRAN) injection 4 mg, Q6H PRN  acetaminophen (TYLENOL) tablet 650 mg, Q6H PRN   Or  acetaminophen (TYLENOL) suppository 650 mg, Q6H PRN  heparin (porcine) injection 5,000 Units, 3 times per day  insulin lispro (HUMALOG) injection vial 0-6 Units, TID WC  insulin lispro (HUMALOG) injection vial 0-3 Units, Nightly  glucose (GLUTOSE) 40 % oral gel 15 g, PRN  dextrose 50 % IV solution, PRN  glucagon (rDNA) injection 1 mg, PRN  dextrose 5 % solution, PRN  meropenem (MERREM) 500 mg IVPB (mini-bag), Q12H  norepinephrine (LEVOPHED) 16 mg in dextrose 5% 250 mL infusion, Continuous          LABS      CBC:   Recent Labs     01/17/22  0358 01/18/22  0444 01/19/22  0317   WBC 8.0 6.7 7.9   RBC 4.01* 3.90* 3.39*   HGB 10.7* 10.3* 8.9*   HCT 34.5* 33.9* 28.9*   MCV 86.0 86.9 85.3   MCH 26.7 26.4 26.3   MCHC 31.0 30.4 30.8 RDW 16.4* 16.5* 16.7*    136* 123*   MPV 10.1 10.5 10.2      BMP:   Recent Labs     01/17/22  1626 01/18/22  0444 01/19/22  0317    142 134*   K 4.5 5.0 4.7    108* 102   CO2 26 24 24   BUN 28* 27* 25*   CREATININE 1.56* 1.34* 1.08   GLUCOSE 235* 209* 199*   CALCIUM 9.2 9.4 8.2*        FERRITIN:    Lab Results   Component Value Date    FERRITIN 160 01/15/2022     MARISA:   Lab Results   Component Value Date    MARISA NEGATIVE 11/03/2021       SPEP:   Lab Results   Component Value Date    PROT 6.7 01/16/2022    ALBCAL 3.3 11/03/2021    ALBPCT 51 11/03/2021    LABALPH 0.2 11/03/2021    LABALPH 0.6 11/03/2021    A1PCT 3 11/03/2021    A2PCT 9 11/03/2021    LABBETA 1.0 11/03/2021    BETAPCT 15 11/03/2021    GAMGLOB 1.5 11/03/2021    GGPCT 23 11/03/2021    PATH ELECTRONICALLY SIGNED. Almita Rivera M.D. 01/15/2022     UPEP:   Lab Results   Component Value Date     01/15/2022     01/15/2022      HEPBSAG:  Lab Results   Component Value Date    HEPBSAG NONREACTIVE 06/19/2014     HEPCAB:  Lab Results   Component Value Date    HEPCAB NONREACTIVE 06/19/2014     C3:   Lab Results   Component Value Date    C3 94 11/03/2021     C4:   Lab Results   Component Value Date    C4 21 11/03/2021     URINE SODIUM:    Lab Results   Component Value Date    TICO <20 01/15/2022      URINE PROTEIN:    Lab Results   Component Value Date     01/15/2022     01/15/2022     URINALYSIS:  U/A:   Lab Results   Component Value Date    NITRU POSITIVE 01/15/2022    COLORU PADMA 01/15/2022    PHUR 5.0 01/15/2022    WBCUA TOO NUMEROUS TO COUNT 01/15/2022    RBCUA TOO NUMEROUS TO COUNT 01/15/2022    MUCUS NOT REPORTED 01/15/2022    TRICHOMONAS NOT REPORTED 01/15/2022    YEAST NOT REPORTED 01/15/2022    BACTERIA MANY 01/15/2022    CLARITYU slightly cloudy 05/04/2016    SPECGRAV 1.037 01/15/2022    LEUKOCYTESUR MOD 01/15/2022    UROBILINOGEN Normal 01/15/2022    BILIRUBINUR  01/15/2022     Presumptive positive. Unable to confirm due to unavailability of reagent. BILIRUBINUR neg 05/04/2016    BILIRUBINUR 3+ 08/30/2011    BLOODU small 05/04/2016    GLUCOSEU NEGATIVE 01/15/2022    GLUCOSEU 1+ 08/30/2011    KETUA 1+ 01/15/2022    AMORPHOUS NOT REPORTED 01/15/2022           ASSESSMENT      1. LEONARDA : Secondary to ischemic ATN from hypotension and low flow state related to intravascular volume depletion. He is also had issues with obstructive uropathy and a complicated UTI for which she has been receiving IV antibiotics. As of late his creatinine has been running around 1.4 or thereabouts. His peak creatinine during this admission was 6.2, now down to 1.0.    2. chronic Clemons with urinary retention and obstruction. Urology following  3. Morbid obesity  4. Sleep apnea  5. History of diabetes mellitus type 2 with complication  6. History of urinary tract infection with Enterococcus and Pseudomonas  7. Encephalopathy likely from underlying infection    PLAN      1. May DC IV fluids   2. Will likely need to start low-dose diuretic tomorrow Will order Demadex 10 mg a day from tomorrow  3. Follow up labs ordered. 4. Following along for another 1 day      Please do not hesitate to call with questions.     This note is created with the assistance of a speech-recognition program. While intending to generate a document that actually reflects the content of the visit, no guarantees can be provided that every mistake has been identified and corrected by editing    Electronically signed by Ciara Sandra MD on 1/19/2022 at 1:10 PM

## 2022-01-19 NOTE — PROGRESS NOTES
[facility]   Urology Progress Note            Subjective:  Follow-up urinary retention, catheter associated UTIs    Patient Vitals for the past 24 hrs:   BP Temp Temp src Pulse Resp SpO2   01/19/22 1300 (!) 134/112 -- -- 77 20 93 %   01/19/22 1200 (!) 150/67 98.7 °F (37.1 °C) Temporal 74 24 97 %   01/19/22 1100 139/60 -- -- 76 20 95 %   01/19/22 1000 (!) 130/54 -- -- 68 18 94 %   01/19/22 0800 (!) 141/68 98.9 °F (37.2 °C) Temporal 64 21 91 %   01/19/22 0700 139/60 -- -- 62 22 92 %   01/19/22 0600 (!) 149/67 -- -- 62 17 96 %   01/19/22 0500 (!) 144/59 -- -- 57 20 96 %   01/19/22 0400 (!) 145/62 98.5 °F (36.9 °C) Temporal 62 18 95 %   01/19/22 0306 -- -- -- -- 21 --   01/19/22 0300 (!) 154/83 -- -- 60 16 97 %   01/19/22 0200 (!) 156/67 -- -- 63 20 96 %   01/19/22 0100 (!) 156/69 -- -- 65 18 97 %   01/19/22 0000 (!) 146/66 98.6 °F (37 °C) Temporal 63 23 96 %   01/18/22 2300 135/64 -- -- 74 23 93 %   01/18/22 2200 (!) 138/50 -- -- 73 19 90 %   01/18/22 2100 (!) 133/56 -- -- 79 20 95 %   01/18/22 2000 (!) 144/60 98.6 °F (37 °C) Temporal 80 25 94 %   01/18/22 1900 (!) 145/63 -- -- 82 23 96 %   01/18/22 1830 (!) 123/55 -- -- 74 19 91 %   01/18/22 1815 -- -- -- -- -- 94 %   01/18/22 1800 129/60 -- -- 82 20 --   01/18/22 1730 134/65 -- -- 82 25 97 %   01/18/22 1700 133/75 -- -- 64 18 --   01/18/22 1630 (!) 142/64 -- -- 66 18 95 %   01/18/22 1600 138/64 97.9 °F (36.6 °C) Temporal 72 20 92 %   01/18/22 1454 -- -- -- -- 21 98 %       Intake/Output Summary (Last 24 hours) at 1/19/2022 1403  Last data filed at 1/19/2022 0516  Gross per 24 hour   Intake 600 ml   Output 1135 ml   Net -535 ml       Recent Labs     01/17/22  0358 01/18/22  0444 01/19/22  0317   WBC 8.0 6.7 7.9   HGB 10.7* 10.3* 8.9*   HCT 34.5* 33.9* 28.9*   MCV 86.0 86.9 85.3    136* 123*     Recent Labs     01/17/22  1626 01/18/22  0444 01/19/22  0317    142 134*   K 4.5 5.0 4.7    108* 102   CO2 26 24 24   BUN 28* 27* 25*   CREATININE 1.56* 1.34* 1.08       No results for input(s): COLORU, PHUR, LABCAST, WBCUA, RBCUA, MUCUS, TRICHOMONAS, YEAST, BACTERIA, CLARITYU, SPECGRAV, LEUKOCYTESUR, UROBILINOGEN, Wallene Keel in the last 72 hours.     Invalid input(s): NITRATE, GLUCOSEUKETONESUAMORPHOUS    Additional Lab/culture results:    Physical Exam: Patient presently doing well renal function back to baseline catheter draining well    Interval Imaging Findings:    Impression:    Patient Active Problem List   Diagnosis    Alcoholic cirrhosis of liver ([facility] [address] sober since 2013    Peripheral edema    Bipolar disorder ([facility] [address] [facility]    Type 2 diabetes mellitus with diabetic neuropathy, without long-term current use of insulin ([address]    Essential hypertension, currently hypotensive    Dyslipidemia    Weakness    Hyponatremia    FABI (obstructive sleep apnea)    Primary osteoarthritis of right knee    Type 2 diabetes mellitus with diabetic neuropathy ([facility] [address]    Acquired hypothyroidism    Urine retention    Anemia    Slow transit constipation    Constipation    Iron deficiency anemia due to chronic blood loss    Gastroesophageal reflux disease without esophagitis    LEONARDA (acute kidney injury) ([address]    Secondary esophageal varices without bleeding ([address]    Portal hypertension ([address]    Morbid obesity with BMI of 50.0-59.9, adult (HCC)    Venous stasis dermatitis of both lower extremities    Cellulitis of perineum    Chronic indwelling Clemons catheter    Anxiety and depression    Back pain, chronic    BPH (benign prostatic hyperplasia)    Chronic diastolic congestive heart failure (HCC)    Cirrhosis (HCC)    Diabetes mellitus ([address]    GERD (gastroesophageal reflux disease)    Hepatitis    Hiatal hernia    Hypertension    IBS (irritable bowel syndrome)    Morbid obesity ([address]    Neuropathy    On home oxygen therapy    Sleep apnea    Thyroid disease    Urinary bladder neurogenic dysfunction    Urinary tract infection associated with indwelling urethral catheter (HCC)    Musculoskeletal immobility    Pyocystis    Myoclonic jerking    Thrombocytopenia (HCC)    Hypotension    Sepsis with acute hypercapnic respiratory failure and septic shock (HCC)    Acute kidney injury superimposed on chronic kidney disease (HCC)    Somnolence    Acute respiratory acidosis       Plan: Maintain indwelling Clemons no change from urology perspective    Beatrice Gonzalez MD  2:03 PM 1/19/2022

## 2022-01-19 NOTE — PROGRESS NOTES
Pulmonary Critical Care Progress Note  IRVING Adams/Mark Rolon MD     Patient seen for the follow up of  Sepsis with acute hypercapnic respiratory failure and septic shock ([address]     Subjective:  He is sitting up in the bed one-to-one sitter at bedside. He remains confused but is alert and cooperative with exam.  He is off of BiPAP, on room air saturating 96%. He is off of Precedex. He denies significant cough or shortness of breath. He is tolerating orals.     Examination:  Vitals: BP (!) 145/63   Pulse 72   Temp 98 °F (36.7 °C) (Temporal)   Resp 20   Ht 6' (1.829 m)   Wt (!) 350 lb (158.8 kg)   SpO2 94%   BMI 47.47 kg/m²   General appearance: alert and cooperative with exam, slightly confused  Neck: No JVD  Lungs: Diminished, no crackles or wheezing  Heart: regular rate and rhythm, S1, S2 normal, no gallop  Abdomen: Soft, non tender, + BS  Extremities: no cyanosis or clubbing. +edema    LABs:  CBC:   Recent Labs     01/18/22  0444 01/19/22 0317   WBC 6.7 7.9   HGB 10.3* 8.9*   HCT 33.9* 28.9*   * 123*     BMP:   Recent Labs     01/18/22  0444 01/19/22 0317    134*   K 5.0 4.7   CO2 24 24   BUN 27* 25*   CREATININE 1.34* 1.08   LABGLOM 55* >60   GLUCOSE 209* 199*     ABG:  Lab Results   Component Value Date    PHART 7.330 06/18/2014    HUX4RML 68.0 06/18/2014    PO2ART 84.0 06/18/2014    IAZ2YIC 34.9 06/18/2014    HLE2WVI NOT REPORTED 01/17/2022    E0HBQMCK 96.5 06/18/2014    FIO2 28.0 01/17/2022       Lab Results   Component Value Date    POCPH 7.353 01/17/2022    PHART 7.330 06/18/2014    POCPCO2 51.5 01/17/2022    FIU1ERU 68.0 06/18/2014    POCPO2 82.2 01/17/2022    PO2ART 84.0 06/18/2014    POCHCO3 28.6 01/17/2022    UPH3UAU 34.9 06/18/2014    NBEA NOT REPORTED 01/17/2022    PBEA 2 01/17/2022    BHT4EBE NOT REPORTED 01/17/2022    NENV4FHX 95 01/17/2022    Q2IXDLGN 96.5 06/18/2014    FIO2 28.0 01/17/2022     Radiology:  X-ray chest 1/17/2022      Impression:  · Acute on chronic hypoxic/hypercarbic respiratory failure  · Sepsis/septic shock  · Obstructive sleep apnea/obesity hypoventilation  · Atelectasis  · Encephalopathy/CO2 narcosis  · Chronic urinary retention with indwelling Clemons  · Acute kidney injury/hepatorenal syndrome  · Liver cirrhosis/history of alcoholism    Recommendations:  · Oxygen via nasal cannula  · BiPAP support as needed and while asleep  · Precedex as needed for agitation, wean as able  · Klonopin 0.5 mg twice daily  · DuoNeb via nebulizer  · Discontinue IV Solu-Medrol 40 mg every 12 hours  · Prednisone taper  · Meropenem per infectious disease  · Monitor blood pressure off Levophed  · Nephrology following off IV fluids, starting low-dose diuresis tomorrow  · Urology following  · DVT prophylaxis, subcu heparin  · GI prophylaxis, Protonix  · We will follow with you      SUNSHINE Cason-CNP   Pulmonary Critical Care and Sleep Medicine,  Patient seen under the supervision of Helen Hair MD, [facility]

## 2022-01-19 NOTE — ED PROVIDER NOTES
eMERGENCY dEPARTMENT eNCOUnter   Independent Attestation     Pt Name: Carrolyn Siemens  MRN: [id number]  [place] 1965  Date of evaluation: 1/18/22     Carrolyn Siemens is a 61 y.o. male with CC: Hearing Problem and Urinary Catheter Problem      Based on the medical record the care appears appropriate. I was personally available for consultation in the Emergency Department.   The care is provided during an unprecedented national emergency due to the novel coronavirus, Karen Mccullough MD  Attending Emergency Physician                    Monique Louis MD  01/18/22 5332

## 2022-01-19 NOTE — PROGRESS NOTES
[facility]  Office: [address], DO, Johny Naranjo, DO, Zunilda Brown, DO, Darron Lozano Blood, DO, Vania Nava MD, Ramiro Valdez MD, Eric Murry MD, Deysi Caruso MD, Christin Cunha MD, Risa Calderon MD, Aretha Márquez MD, Jean Carlos Mcdonough, DO, Joey Mathew DO, Ashly Mcmahan MD,  Geovanna Lee, DO, Marion Stanley MD, Reed Hoskins MD, Anna Larsen MD, Leena Lopez MD, Meme Bernard MD, Luz Elena Sandra MD, Richard Santoyo MD, Raghav Higginbotham, [facility], [facility], CNP, Jane Rubalcava, CNP, Van Singh, CNS, Robinson Gagnon, CNP, Shannan Aguirre, CNP, Dana Marsh, CNP, Denisha Desir, CNP, Tavia Henderson, CNP, Diego Guzman PA-C, Antonella Banda, [facility], Karen Cuadra [facility], Anibal Son, CNP, Nyla Chi, CNP, Candy Mendez, CNP, Jaspal Pascual, CNP, Erika Chaves, [facility], Amor Nava, [facility]    Progress Note    1/19/2022    3:32 PM    Name:   Benedicto Jean  MRN:     [id number]     Acct:      [de-identified]   Room:   2031/2031-01  IP Day:  4  Admit Date:  1/15/2022  1:31 PM    PCP:   Randell Stevenson MD  Code Status:  Full Code    Subjective:     C/C:   Chief Complaint   Patient presents with    Altered Mental Status     Interval History Status:   Patient is still confused, oriented to self and knows he is in hospital  He is on BiPAP off and on  Has chronic indwelling Clemons's catheter, urine culture growing Pseudomonas and Enterococcus, currently getting IV antibiotics-meropenem  CT scan showed nonobstructive right kidney stone-urology evaluating  Serum creatinine improved to 1.08  Is off pressor support    Brief History:   Per my MARK:  \"Ruben Dimas is a 61 y.o. Non- / non  male who presents with Altered Mental Status   and is admitted to the hospital for the management of Sepsis ([facility] [address]      Per the ER note Patient is a 50-year-old male with a history of CHF and COPD who presented to the emergency department by EMS from home secondary to altered mental status.  History as per EMS who stated sinus pain and altered throughout the day no history of trauma or fall however when EMS arrived patient was alert answering questions appropriately and then fell asleep.  Patient stated he is on oxygen but is not used at all times.  States that he is vaccinated; no known COVID exposure.  Denied any trauma or injury.  Patient denied difficulty speaking or weakness.  Patient denied chest pain, shortness of breath, nausea, vomiting, fevers or chills     Patient was seen in our ER on 1/12/2022 related to dysuria.  According to the notes his Clemons catheter was changed out. [facility] [place] document no urinary retention after replacement of Clemons.  At that visit his BUN and creatinine were 27/3. 71.  The ER note from the 12th also notes a blood pressure of 85/50 with a pulse of 66.  Patient told the ER staff that he has been running low.  Patient was discharged.  [facility] the patient's bun/creatinine is 46/ 6. 24.  On review of his chart he was hospitalized in November 2021 with a creatinine of 4.62 that trended down to 1.8 after receiving fluids. [facility] the patient was admitted with pneumonia.  According to the notes the patient developed LEONARDA from overdiuresis, ischemic ATN secondary to hypotension.  At that time his creatinine bumped to 2.72 with fluids recovered to 1.48      Review of Systems:     Unable to obtain, patient is confused, knows he is in hospital    Medications: Allergies:     Allergies   Allergen Reactions    No Known Allergies        Current Meds:   Scheduled Meds:    [START ON 1/20/2022] torsemide  10 mg Oral Daily    clonazePAM  0.5 mg Oral BID    midodrine  10 mg Oral TID    ipratropium-albuterol  1 ampule Inhalation 4x daily    methylPREDNISolone  40 mg IntraVENous Q12H    sodium chloride  1,000 mL IntraVENous Once    aspirin  81 mg Oral Daily    DULoxetine  60 mg Oral QAM    pantoprazole  40 mg Oral QAM AC    levothyroxine  175 mcg Oral Daily    oxybutynin  5 mg Oral BID    tamsulosin  0.4 mg Oral Daily    sodium chloride flush  5-40 mL IntraVENous 2 times per day    heparin (porcine)  5,000 Units SubCUTAneous 3 times per day    insulin lispro  0-6 Units SubCUTAneous TID WC    insulin lispro  0-3 Units SubCUTAneous Nightly    meropenem  500 mg IntraVENous Q12H     Continuous Infusions:    DOPamine Stopped (01/18/22 0850)    dexmedetomidine (PRECEDEX) IV infusion Stopped (01/19/22 1104)    sodium chloride      sodium chloride      dextrose      norepinephrine Stopped (01/17/22 1315)     PRN Meds: fentanNYL, LORazepam, sodium chloride, albuterol sulfate HFA, sodium chloride flush, sodium chloride, ondansetron **OR** ondansetron, acetaminophen **OR** acetaminophen, glucose, dextrose, glucagon (rDNA), dextrose    Data:     Past Medical History:   has a past medical history of Anxiety and depression, Back pain, chronic, BPH (benign prostatic hyperplasia), Cellulitis of left lower extremity, Cellulitis of right lower extremity, CHF (congestive heart failure) ([address] Cirrhosis ([address] Diabetes mellitus ([address] Epididymoorchitis, GERD (gastroesophageal reflux disease), H/O cardiac catheterization, Hepatitis, Hiatal hernia, History of alcohol abuse, Hyperlipidemia, Hypertension, IBS (irritable bowel syndrome), Incisional hernia with obstruction but no gangrene, Klebsiella sepsis ([address] Metabolic encephalopathy, Morbid obesity ([address] Neuropathy, On home oxygen therapy, On home oxygen therapy, Pancreatitis, Poisoning by insulin and oral hypoglycemic (antidiabetic) drugs, accidental (unintentional), initial encounter, Primary osteoarthritis of right knee, Retention, urine, SBO (small bowel obstruction) ([address] Septic shock ([address] Sleep apnea, Sleep apnea, obstructive, Thyroid disease, Urinary bladder neurogenic dysfunction, and Urinary tract infection associated with indwelling urethral catheter ([address]     Social History: reports that he has never smoked. He has never used smokeless tobacco. He reports that he does not drink alcohol and does not use drugs. Family History:   Family History   Adopted: Yes   Problem Relation Age of Onset    No Known Problems Mother         Adopted    No Known Problems Father         Adopted       Vitals:  BP (!) 145/63   Pulse 72   Temp 98.7 °F (37.1 °C) (Temporal)   Resp 20   Ht 6' (1.829 m)   Wt (!) 350 lb (158.8 kg)   SpO2 94%   BMI 47.47 kg/m²   Temp (24hrs), Av.5 °F (36.9 °C), Min:97.9 °F (36.6 °C), Max:98.9 °F (37.2 °C)    Recent Labs     22  2337 22  0345 22  0715 22  1117   POCGLU 202* 200* 233* 235*       I/O (24Hr): Intake/Output Summary (Last 24 hours) at 2022 1532  Last data filed at 2022 0516  Gross per 24 hour   Intake 600 ml   Output 1135 ml   Net -535 ml       Labs:  Hematology:  Recent Labs     22  0358 22   WBC 8.0 6.7 7.9   RBC 4.01* 3.90* 3.39*   HGB 10.7* 10.3* 8.9*   HCT 34.5* 33.9* 28.9*   MCV 86.0 86.9 85.3   MCH 26.7 26.4 26.3   MCHC 31.0 30.4 30.8   RDW 16.4* 16.5* 16.7*    136* 123*   MPV 10.1 10.5 10.2     Chemistry:  Recent Labs     22  0358 22  0358 22  1626 22      < > 140 142 134*   K 4.6   < > 4.5 5.0 4.7   *   < > 106 108* 102   CO2 25   < > 26 24 24   GLUCOSE 116*   < > 235* 209* 199*   BUN 31*   < > 28* 27* 25*   CREATININE 2.00*   < > 1.56* 1.34* 1.08   ANIONGAP 11   < > 8* 10 8*   LABGLOM 35*   < > 46* 55* >60   GFRAA 42*   < > 56* >60 >60   CALCIUM 9.2   < > 9.2 9.4 8.2*   PROBNP 2,099*  --   --   --   --    TROPHS 23*  --   --   --   --     < > = values in this interval not displayed.      Recent Labs     22  1109 22  1940 22  2337 22  0345 22  0715 22  1117   POCGLU 203* 193* 202* 200* 233* 235*     ABG:  Lab Results   Component Value Date    POCPH 7.353 2022    PHART 7.330 changes both lower extremities    Assessment:        Hospital Problems           Last Modified POA    * (Principal) Sepsis with acute hypercapnic respiratory failure and septic shock ([address] 4/45/2494 Yes    Alcoholic cirrhosis of liver ([address] sober since 2013 1/15/2022 Yes    Bipolar disorder ([address] 1/15/2022 Yes    Type 2 diabetes mellitus with diabetic neuropathy, without long-term current use of insulin ([address] 1/15/2022 Yes    Essential hypertension, currently hypotensive 1/15/2022 Yes    FABI (obstructive sleep apnea) 1/15/2022 Yes    Type 2 diabetes mellitus with diabetic neuropathy ([address] (Chronic) 1/15/2022 Yes    Acquired hypothyroidism 1/15/2022 Yes    Venous stasis dermatitis of both lower extremities (Chronic) 1/15/2022 Yes    Chronic indwelling Clemons catheter (Chronic) 1/15/2022 Yes    BPH (benign prostatic hyperplasia) 1/15/2022 Yes    Chronic diastolic congestive heart failure ([address] 1/16/2022 Yes    On home oxygen therapy 1/15/2022 Yes    Overview Signed 11/4/2020  2:08 PM by SUNSHINE Ferguson NP     prn         Urinary bladder neurogenic dysfunction 1/15/2022 Yes    Urinary tract infection associated with indwelling urethral catheter ([address] 1/15/2022 Yes    Acute kidney injury superimposed on chronic kidney disease ([address] 1/15/2022 Yes    Somnolence 1/15/2022 Yes    Acute respiratory acidosis 1/16/2022 Yes          Plan:        1. Continue IV meropenem in consultation with ID-stop date 1/21/2022  2. BiPAP as needed if he cooperates  3. Monitor renal function, nephrology following, recommended stop IV fluids and start Demadex 10 mg daily from tomorrow  4. Neurology also evaluated the patient  5. Urology recommended to keep indwelling catheter, patient will need cystoscopy eventually  6. Discontinue Precedex drip, started on oral Klonopin by critical care yesterday  7.  Will discharge back to [facility] in next 1 to 2 days when more stable    Alyssa Phalen, MD  1/19/2022  3:32 PM

## 2022-01-20 LAB
ANION GAP SERPL CALCULATED.3IONS-SCNC: 10 MMOL/L (ref 9–17)
BUN BLDV-MCNC: 27 MG/DL (ref 6–20)
BUN/CREAT BLD: 23 (ref 9–20)
CALCIUM SERPL-MCNC: 8.3 MG/DL (ref 8.6–10.4)
CHLORIDE BLD-SCNC: 101 MMOL/L (ref 98–107)
CO2: 22 MMOL/L (ref 20–31)
CREAT SERPL-MCNC: 1.17 MG/DL (ref 0.7–1.2)
CULTURE: NORMAL
CULTURE: NORMAL
GFR AFRICAN AMERICAN: >60 ML/MIN
GFR NON-AFRICAN AMERICAN: >60 ML/MIN
GFR SERPL CREATININE-BSD FRML MDRD: ABNORMAL ML/MIN/{1.73_M2}
GFR SERPL CREATININE-BSD FRML MDRD: ABNORMAL ML/MIN/{1.73_M2}
GLUCOSE BLD-MCNC: 178 MG/DL (ref 75–110)
GLUCOSE BLD-MCNC: 211 MG/DL (ref 75–110)
GLUCOSE BLD-MCNC: 228 MG/DL (ref 75–110)
GLUCOSE BLD-MCNC: 234 MG/DL (ref 75–110)
GLUCOSE BLD-MCNC: 237 MG/DL (ref 75–110)
GLUCOSE BLD-MCNC: 242 MG/DL (ref 75–110)
GLUCOSE BLD-MCNC: 245 MG/DL (ref 75–110)
GLUCOSE BLD-MCNC: 266 MG/DL (ref 70–99)
HCT VFR BLD CALC: 28.4 % (ref 40.7–50.3)
HEMOGLOBIN: 9 G/DL (ref 13–17)
Lab: NORMAL
Lab: NORMAL
MCH RBC QN AUTO: 26.5 PG (ref 25.2–33.5)
MCHC RBC AUTO-ENTMCNC: 31.7 G/DL (ref 28.4–34.8)
MCV RBC AUTO: 83.8 FL (ref 82.6–102.9)
NRBC AUTOMATED: 0 PER 100 WBC
PDW BLD-RTO: 16.2 % (ref 11.8–14.4)
PLATELET # BLD: 115 K/UL (ref 138–453)
PMV BLD AUTO: 10.4 FL (ref 8.1–13.5)
POTASSIUM SERPL-SCNC: 4.5 MMOL/L (ref 3.7–5.3)
RBC # BLD: 3.39 M/UL (ref 4.21–5.77)
SODIUM BLD-SCNC: 133 MMOL/L (ref 135–144)
SPECIMEN DESCRIPTION: NORMAL
SPECIMEN DESCRIPTION: NORMAL
WBC # BLD: 6.6 K/UL (ref 3.5–11.3)

## 2022-01-20 PROCEDURE — 94761 N-INVAS EAR/PLS OXIMETRY MLT: CPT

## 2022-01-20 PROCEDURE — 85027 COMPLETE CBC AUTOMATED: CPT

## 2022-01-20 PROCEDURE — 2700000000 HC OXYGEN THERAPY PER DAY

## 2022-01-20 PROCEDURE — 99232 SBSQ HOSP IP/OBS MODERATE 35: CPT | Performed by: INTERNAL MEDICINE

## 2022-01-20 PROCEDURE — 82947 ASSAY GLUCOSE BLOOD QUANT: CPT

## 2022-01-20 PROCEDURE — 97163 PT EVAL HIGH COMPLEX 45 MIN: CPT

## 2022-01-20 PROCEDURE — 97530 THERAPEUTIC ACTIVITIES: CPT

## 2022-01-20 PROCEDURE — 6370000000 HC RX 637 (ALT 250 FOR IP): Performed by: NURSE PRACTITIONER

## 2022-01-20 PROCEDURE — 94640 AIRWAY INHALATION TREATMENT: CPT

## 2022-01-20 PROCEDURE — 2580000003 HC RX 258: Performed by: NURSE PRACTITIONER

## 2022-01-20 PROCEDURE — 6370000000 HC RX 637 (ALT 250 FOR IP): Performed by: INTERNAL MEDICINE

## 2022-01-20 PROCEDURE — 1200000000 HC SEMI PRIVATE

## 2022-01-20 PROCEDURE — 97167 OT EVAL HIGH COMPLEX 60 MIN: CPT

## 2022-01-20 PROCEDURE — 6360000002 HC RX W HCPCS: Performed by: NURSE PRACTITIONER

## 2022-01-20 PROCEDURE — 36415 COLL VENOUS BLD VENIPUNCTURE: CPT

## 2022-01-20 PROCEDURE — 97535 SELF CARE MNGMENT TRAINING: CPT

## 2022-01-20 PROCEDURE — 94660 CPAP INITIATION&MGMT: CPT

## 2022-01-20 PROCEDURE — 6360000002 HC RX W HCPCS: Performed by: INTERNAL MEDICINE

## 2022-01-20 PROCEDURE — 97116 GAIT TRAINING THERAPY: CPT

## 2022-01-20 PROCEDURE — 99232 SBSQ HOSP IP/OBS MODERATE 35: CPT | Performed by: NURSE PRACTITIONER

## 2022-01-20 PROCEDURE — 80048 BASIC METABOLIC PNL TOTAL CA: CPT

## 2022-01-20 PROCEDURE — 97110 THERAPEUTIC EXERCISES: CPT

## 2022-01-20 RX ORDER — IPRATROPIUM BROMIDE AND ALBUTEROL SULFATE 2.5; .5 MG/3ML; MG/3ML
1 SOLUTION RESPIRATORY (INHALATION) 4 TIMES DAILY PRN
Status: DISCONTINUED | OUTPATIENT
Start: 2022-01-20 | End: 2022-01-21 | Stop reason: HOSPADM

## 2022-01-20 RX ADMIN — INSULIN LISPRO 1 UNITS: 100 INJECTION, SOLUTION INTRAVENOUS; SUBCUTANEOUS at 20:23

## 2022-01-20 RX ADMIN — INSULIN LISPRO 2 UNITS: 100 INJECTION, SOLUTION INTRAVENOUS; SUBCUTANEOUS at 12:22

## 2022-01-20 RX ADMIN — TAMSULOSIN HYDROCHLORIDE 0.4 MG: 0.4 CAPSULE ORAL at 08:18

## 2022-01-20 RX ADMIN — FENTANYL CITRATE 25 MCG: 50 INJECTION, SOLUTION INTRAMUSCULAR; INTRAVENOUS at 14:42

## 2022-01-20 RX ADMIN — TORSEMIDE 10 MG: 10 TABLET ORAL at 08:19

## 2022-01-20 RX ADMIN — FENTANYL CITRATE 25 MCG: 50 INJECTION, SOLUTION INTRAMUSCULAR; INTRAVENOUS at 09:33

## 2022-01-20 RX ADMIN — CLONAZEPAM 0.5 MG: 0.5 TABLET ORAL at 20:04

## 2022-01-20 RX ADMIN — MEROPENEM 500 MG: 500 INJECTION, POWDER, FOR SOLUTION INTRAVENOUS at 17:45

## 2022-01-20 RX ADMIN — PREDNISONE 40 MG: 20 TABLET ORAL at 08:18

## 2022-01-20 RX ADMIN — FENTANYL CITRATE 25 MCG: 50 INJECTION, SOLUTION INTRAMUSCULAR; INTRAVENOUS at 06:14

## 2022-01-20 RX ADMIN — IPRATROPIUM BROMIDE AND ALBUTEROL SULFATE 1 AMPULE: .5; 2.5 SOLUTION RESPIRATORY (INHALATION) at 06:46

## 2022-01-20 RX ADMIN — HEPARIN SODIUM 5000 UNITS: 5000 INJECTION INTRAVENOUS; SUBCUTANEOUS at 14:42

## 2022-01-20 RX ADMIN — HEPARIN SODIUM 5000 UNITS: 5000 INJECTION INTRAVENOUS; SUBCUTANEOUS at 06:13

## 2022-01-20 RX ADMIN — INSULIN LISPRO 1 UNITS: 100 INJECTION, SOLUTION INTRAVENOUS; SUBCUTANEOUS at 08:17

## 2022-01-20 RX ADMIN — FENTANYL CITRATE 25 MCG: 50 INJECTION, SOLUTION INTRAMUSCULAR; INTRAVENOUS at 23:16

## 2022-01-20 RX ADMIN — SODIUM CHLORIDE, PRESERVATIVE FREE 10 ML: 5 INJECTION INTRAVENOUS at 08:18

## 2022-01-20 RX ADMIN — DULOXETINE HYDROCHLORIDE 60 MG: 60 CAPSULE, DELAYED RELEASE ORAL at 08:18

## 2022-01-20 RX ADMIN — INSULIN LISPRO 2 UNITS: 100 INJECTION, SOLUTION INTRAVENOUS; SUBCUTANEOUS at 17:39

## 2022-01-20 RX ADMIN — OXYBUTYNIN CHLORIDE 5 MG: 5 TABLET ORAL at 08:18

## 2022-01-20 RX ADMIN — PANTOPRAZOLE SODIUM 40 MG: 40 TABLET, DELAYED RELEASE ORAL at 06:14

## 2022-01-20 RX ADMIN — LORAZEPAM 1 MG: 2 INJECTION INTRAMUSCULAR; INTRAVENOUS at 14:14

## 2022-01-20 RX ADMIN — LORAZEPAM 1 MG: 2 INJECTION INTRAMUSCULAR; INTRAVENOUS at 23:16

## 2022-01-20 RX ADMIN — OXYBUTYNIN CHLORIDE 5 MG: 5 TABLET ORAL at 20:04

## 2022-01-20 RX ADMIN — FENTANYL CITRATE 25 MCG: 50 INJECTION, SOLUTION INTRAMUSCULAR; INTRAVENOUS at 01:27

## 2022-01-20 RX ADMIN — LORAZEPAM 1 MG: 2 INJECTION INTRAMUSCULAR; INTRAVENOUS at 03:55

## 2022-01-20 RX ADMIN — MEROPENEM 500 MG: 500 INJECTION, POWDER, FOR SOLUTION INTRAVENOUS at 06:19

## 2022-01-20 RX ADMIN — HEPARIN SODIUM 5000 UNITS: 5000 INJECTION INTRAVENOUS; SUBCUTANEOUS at 20:04

## 2022-01-20 RX ADMIN — FENTANYL CITRATE 25 MCG: 50 INJECTION, SOLUTION INTRAMUSCULAR; INTRAVENOUS at 16:31

## 2022-01-20 RX ADMIN — FENTANYL CITRATE 25 MCG: 50 INJECTION, SOLUTION INTRAMUSCULAR; INTRAVENOUS at 03:55

## 2022-01-20 RX ADMIN — CLONAZEPAM 0.5 MG: 0.5 TABLET ORAL at 08:18

## 2022-01-20 RX ADMIN — ASPIRIN 81 MG: 81 TABLET, COATED ORAL at 08:18

## 2022-01-20 RX ADMIN — ACETAMINOPHEN 650 MG: 325 TABLET ORAL at 06:14

## 2022-01-20 RX ADMIN — LEVOTHYROXINE SODIUM 175 MCG: 0.17 TABLET ORAL at 06:14

## 2022-01-20 ASSESSMENT — PAIN DESCRIPTION - ORIENTATION
ORIENTATION: LEFT;RIGHT
ORIENTATION: RIGHT;LEFT
ORIENTATION: LEFT;RIGHT

## 2022-01-20 ASSESSMENT — ENCOUNTER SYMPTOMS
GASTROINTESTINAL NEGATIVE: 1
RESPIRATORY NEGATIVE: 1

## 2022-01-20 ASSESSMENT — PAIN SCALES - GENERAL
PAINLEVEL_OUTOF10: 0
PAINLEVEL_OUTOF10: 7
PAINLEVEL_OUTOF10: 5
PAINLEVEL_OUTOF10: 7
PAINLEVEL_OUTOF10: 7
PAINLEVEL_OUTOF10: 5
PAINLEVEL_OUTOF10: 0
PAINLEVEL_OUTOF10: 7
PAINLEVEL_OUTOF10: 5

## 2022-01-20 ASSESSMENT — PAIN DESCRIPTION - PAIN TYPE
TYPE: CHRONIC PAIN

## 2022-01-20 ASSESSMENT — PAIN DESCRIPTION - LOCATION
LOCATION: FOOT

## 2022-01-20 ASSESSMENT — PAIN DESCRIPTION - FREQUENCY
FREQUENCY: CONTINUOUS
FREQUENCY: CONTINUOUS

## 2022-01-20 NOTE — PROGRESS NOTES
Physical Therapy    Facility/Department: [facility] CVICU  Initial Assessment    NAME: Luz Elena Villalobos  : 1965  MRN: [id number]    Date of Service: 2022   GERMAIN Roa reports patient is medically stable for therapy treatment this date. Chart reviewed prior to treatment and patient is agreeable for therapy. All lines intact and patient positioned comfortably at end of treatment. All patient needs addressed prior to ending therapy session. Per H&P: Luz Elena Villalobos is a 61 y.o. Non- / non  male who presents with Altered Mental Status and is admitted to the hospital for the management of Sepsis ([facility] [address] Per the ER note Patient is a 58-year-old male with a history of CHF and COPD who presented to the emergency department by EMS from home secondary to altered mental status.  History as per EMS who stated sinus pain and altered throughout the day no history of trauma or fall however when EMS arrived patient was alert answering questions appropriately and then fell asleep.  Patient stated he is on oxygen but she is used at all times.  States that he is vaccinated no known COVID exposure.  Denied any trauma or injury.  Patient denied difficulty speaking weakness.  Patient denied chest pain, shortness of breath, nausea, vomiting, fevers or chills    Discharge Recommendations:  Pt currently functioning below baseline. Would suggest additional therapy at time of discharge to maximize long term outcomes and prevent re-admission. Please refer to AM-PAC score for current level of function. Patient would benefit from continued therapy after discharge      Assessment   Body structures, Functions, Activity limitations: Decreased functional mobility ; Decreased strength;Decreased safe awareness;Decreased balance;Decreased sensation;Decreased endurance;Decreased posture  Assessment: Pt tolerated PT eval fair. Pt w/ poor steadiness throughout transfers and ambulation this date.   Pt only able to ambulate 2 steps laterally along EOB this date requiring MaxA x 2 throughout. Pt is a HIGH fall risk d/t decreased safety awareness, decreased balance, decreased strength, and decreased endurance. Pt currently requires skilled 2 person assist for safe functional mobility. Pt would benefit from continued skilled physical therapy to address these deficits in order to maximize independence w/ functional mobility. Prognosis: Good  Decision Making: High Complexity  PT Education: Goals;PT Role;Plan of Care;General Safety; Energy Conservation;Transfer Training;Functional Mobility Training;Gait Training  Patient Education: Pt educated on: purpose of acute PT eval, importance of continued mobility throughout admission, general safety awareness, pursed lip breathing, safe transfers w/ RW, proper gait sequence w/ RW, correct posture, prevention of sedentary complications, and PT POC. Pt verbalized fair understanding. Pt requires continued reinforcement of education. REQUIRES PT FOLLOW UP: Yes  Activity Tolerance  Activity Tolerance: Patient limited by endurance; Patient limited by fatigue;Treatment limited secondary to medical complications (free text)  Activity Tolerance: Pt's HR josue w/ ambulation this date to 138bpm but quickly returned to ~100bpm w/ seated rest and max verbal cueing for pursed lip breathing. Pt also denying difficulty breathing w/ activity however demonstrating SOB and labored breathing following activity. Patient Diagnosis(es): The primary encounter diagnosis was Acute encephalopathy. Diagnoses of LEONARDA (acute kidney injury) ([facility] [address] and Urinary tract infection associated with catheterization of urinary tract, unspecified indwelling urinary catheter type, initial encounter [facility]) were also pertinent to this visit.      has a past medical history of Anxiety and depression, Back pain, chronic, BPH (benign prostatic hyperplasia), Cellulitis of left lower extremity, Cellulitis of right lower extremity, CHF (congestive heart failure) ([address] Cirrhosis ([address] Diabetes mellitus ([address] Epididymoorchitis, GERD (gastroesophageal reflux disease), H/O cardiac catheterization, Hepatitis, Hiatal hernia, History of alcohol abuse, Hyperlipidemia, Hypertension, IBS (irritable bowel syndrome), Incisional hernia with obstruction but no gangrene, Klebsiella sepsis ([address] Metabolic encephalopathy, Morbid obesity ([address] Neuropathy, On home oxygen therapy, On home oxygen therapy, Pancreatitis, Poisoning by insulin and oral hypoglycemic (antidiabetic) drugs, accidental (unintentional), initial encounter, Primary osteoarthritis of right knee, Retention, urine, SBO (small bowel obstruction) ([address] Septic shock ([address] Sleep apnea, Sleep apnea, obstructive, Thyroid disease, Urinary bladder neurogenic dysfunction, and Urinary tract infection associated with indwelling urethral catheter ([address] has a past surgical history that includes Colonoscopy; Abdomen surgery; hernia repair; Endoscopy, colon, diagnostic; Upper gastrointestinal endoscopy (07/19/2017); pr egd transoral biopsy single/multiple (N/A, 7/19/2017); pr deep dissec foot infec,1 bursa (Bilateral, 8/22/2017); Cystoscopy (01/24/2018); pr cystourethroscopy (N/A, 1/24/2018); Incisional hernia repair (05/20/2018); ventral hernia repair (N/A, 5/20/2018); Cystocopy (02/20/2019); Cystoscopy (N/A, 2/20/2019); Upper gastrointestinal endoscopy (N/A, 3/14/2019); and Cystoscopy (N/A, 8/23/2019). Restrictions  Restrictions/Precautions  Restrictions/Precautions: General Precautions,Fall Risk  Required Braces or Orthoses?: No  Position Activity Restriction  Other position/activity restrictions:  Clemons Cath, RUE IV, up with assist, telemetry  Vision/Hearing  Vision: Impaired Claudell Body to get some new ones\")  Vision Exceptions: Wears glasses at all times  Hearing: Within functional limits     Subjective  General  Patient assessed for rehabilitation services?: Yes  Response To Previous Treatment: Not applicable  Family / Caregiver Present: No  Follows Commands: Within Functional Limits  General Comment  Comments: RN and pt agreeable to therapy. Pt supine in bed upon arrival.  Pt pleasant and cooperative throughout. Subjective  Subjective: \"I won't be going to the olympics but I get by. \"        Orientation  Orientation  Overall Orientation Status: Within Functional Limits  Social/Functional History  Social/Functional History  Lives With: Son (30 y.o. son lives there \"he's not much help\")  Type of Home: House  Home Layout: One level  Home Access: Ramped entrance  Bathroom Shower/Tub: Walk-in shower  Bathroom Toilet: Standard  Bathroom Equipment: Built-in shower seat,Grab bars around toilet,[facility]ab bars in [facility]: [address] aid,Lift chair (uses RW in home, self-propels WC)  Receives Help From: Home health (aide 8 hours/day - grocery shopping, cooking, cleaning, laundry, showers & 2 nurses come to help w/ medicine, insulin & PT/OT come \"a couple times a week\")  ADL Assistance: Needs assistance  Homemaking Assistance: Needs assistance  Homemaking Responsibilities: No  Ambulation Assistance: Needs assistance  Transfer Assistance: Needs assistance  Active : No  Patient's  Info: medical transport  Occupation: On disability  Type of occupation:   Leisure & Hobbies: watch tv, sports, go visit friends  IADL Comments: sleeps in a recliner  Additional Comments: Pt reports \"sliding off the couch\" ~ 1 week ago. Cognition   Cognition  Overall Cognitive Status: Exceptions  Arousal/Alertness: Appropriate responses to stimuli  Following Commands: Follows multistep commands with repitition; Follows multistep commands with increased time  Attention Span: Attends with cues to redirect; Difficulty attending to directions  Memory: Appears intact  Safety Judgement: Decreased awareness of need for safety;Decreased awareness of need for assistance  Problem Solving: Decreased awareness of errors;Assistance required to identify errors made;Assistance required to correct errors made;Assistance required to implement solutions;Assistance required to generate solutions  Insights: Decreased awareness of deficits  Initiation: Requires cues for some  Sequencing: Requires cues for some  Cognition Comment: Pt w/ tangential speech throughout PT eval requiring frequent redirect. Objective     Observation/Palpation  Posture: Fair  Observation: BMI 47, LLE externally rotated, dry/flakey skin  Edema: BLE    AROM RLE (degrees)  RLE AROM: WFL  AROM LLE (degrees)  LLE AROM : WFL  AROM RUE (degrees)  RUE AROM : WFL  AROM LUE (degrees)  LUE AROM : WFL  Strength RLE  Strength RLE: WFL  Comment: Grossly 4-/5  Strength LLE  Strength LLE: WFL  Comment: Grossly 4-/5  Strength RUE  Comment: See OT assessment for detail  Strength LUE  Comment: See OT assessment for detail  Tone RLE  RLE Tone: Normotonic  Tone LLE  LLE Tone: Normotonic  Motor Control  Gross Motor?: WFL  Sensation  Overall Sensation Status: Impaired (reports chronic numbness/tingling in B feet)  Bed mobility  Supine to Sit: Minimal assistance  Sit to Supine: Minimal assistance  Scooting: Minimal assistance  Comment: Pt requiring increased time and effort to perform bed mobility tasks throughout session w/ max verbal cueing for use of bedrails and pursed lip breathing throughout. Pt w/ fair return demo. Pt also requiring assist w/ safe line mgmt throughout. Transfers  Sit to Stand: Maximum Assistance;2 Person Assistance  Stand to sit: Maximum Assistance;2 Person Assistance  Comment: Pt w/ poor steadiness throughout transfers this date requiring max verbal and tactile cueing for proper hand placement throughout transfers w/ RW w/ poor return demo. Pt also requiring max verbal cueing for activation of hip extensors upon standing as pt demonstrating forward flexed trunk w/ poor return demo.   Pt demonstrating poor eccentric quad control throughout stand>sit transfers this date. Ambulation  Ambulation?: Yes  More Ambulation?: No  Ambulation 1  Surface: level tile  Device: Rolling Walker  Assistance: Maximum assistance;2 Person assistance  Quality of Gait: poor steadiness, poor posture  Gait Deviations: Slow Lita;Staggers; Shuffles;Decreased step length;Decreased step height; Increased DONAL  Distance: 2 steps laterally along EOB  Comments: Pt ambulating w/ poor steadiness this date requiring MaxA x 2 for safety throughout. Pt requiring assist w/ progression of RW throughout. Pt also requiring max verbal cueing for progression of BLE throughout. Stairs/Curb  Stairs?: No     Balance  Posture: Poor  Sitting - Static: Good;-  Sitting - Dynamic: Fair;+  Standing - Static: Fair;-  Standing - Dynamic: Poor;+  Single Leg Stance R Le  Single Leg Stance L Le  Comments: Standing balance assessed w/ RW        Plan   Plan  Times per week: 1-2x/day, 5-6x/week  Current Treatment Recommendations: Strengthening,Balance Training,Functional Mobility Training,Gait Training,Transfer Training,Endurance Training,Safety Education & Training,Home Exercise Program,Equipment Evaluation, Education, & procurement,Patient/Caregiver Education & Training  Safety Devices  Type of devices: Bed alarm in place,Call light within reach,Gait belt,Nurse notified,Left in bed  Restraints  Initially in place: No    AM-PAC Score  AM-PAC Inpatient Mobility Raw Score : 13 (22)  AM-PAC Inpatient T-Scale Score : 36.74 (22)  Mobility Inpatient CMS 0-100% Score: 64.91 (22)  Mobility Inpatient CMS G-Code Modifier : CL (22)       Functional Outcome Measure-   Single Leg Stance Test:  0 sec.  (<5 sec.= fall risk)    Goals  Short term goals  Time Frame for Short term goals: 14 visits  Short term goal 1: Pt to demonstrate bed mobility Jon  Short term goal 2: Pt to perform STS transfers w/ RW Sujatha x 2  Short term goal 3: Pto ambulate at least 50ft w/ RW ModA x 2  Short term goal 4: Pt to actively participate in at least 30 minutes of physical therapy for ther act, ther ex, balance, transfer, and gait training  Patient Goals   Patient goals : To go home       Therapy Time   Individual Concurrent Group Co-treatment   Time In 1024         Time Out 1119         Minutes 55           Treatment time: 40 minutes       Co-treatment with OT warranted secondary to decreased safety and independence requiring 2 skilled therapy professionals to address individual discipline's goals.      Samy Wisdom, PT

## 2022-01-20 NOTE — PLAN OF CARE
Problem: Skin Integrity:  Goal: Will show no infection signs and symptoms  Description: Will show no infection signs and symptoms  1/20/2022 1759 by Diego Lugo RN  Outcome: Ongoing  1/20/2022 1759 by Diego Lugo RN  Outcome: Ongoing  Goal: Absence of new skin breakdown  Description: Absence of new skin breakdown  1/20/2022 1759 by Diego Lugo RN  Outcome: Ongoing  1/20/2022 1759 by Diego Lugo RN  Outcome: Ongoing     Problem: Falls - Risk of:  Goal: Will remain free from falls  Description: Will remain free from falls  1/20/2022 1759 by Diego Lugo RN  Outcome: Ongoing  1/20/2022 1759 by Diego Lugo RN  Outcome: Ongoing  Goal: Absence of physical injury  Description: Absence of physical injury  1/20/2022 1759 by Diego Lugo RN  Outcome: Ongoing  1/20/2022 1759 by Diego Lugo RN  Outcome: Ongoing     Problem: Non-Violent Restraints  Goal: No harm/injury to patient while restraints in use  Outcome: Ongoing     Problem: Pain:  Description: Pain management should include both nonpharmacologic and pharmacologic interventions.   Goal: Pain level will decrease  Description: Pain level will decrease  1/20/2022 1759 by Diego Lugo RN  Outcome: Ongoing  1/20/2022 1759 by Diego Lugo RN  Outcome: Ongoing  Goal: Control of acute pain  Description: Control of acute pain  1/20/2022 1759 by Diego Lugo RN  Outcome: Ongoing  1/20/2022 1759 by Diego Lugo RN  Outcome: Ongoing  Goal: Control of chronic pain  Description: Control of chronic pain  1/20/2022 1759 by Diego Lugo RN  Outcome: Ongoing  1/20/2022 1759 by Diego Lugo RN  Outcome: Ongoing

## 2022-01-20 NOTE — PROGRESS NOTES
to be motivated to work with therapy but is demonstrating self limiting behaviors with other staff memebers. Safety Devices  Safety Devices in place: Yes  Type of devices: Nurse notified;Gait belt;Bed alarm in place;Call light within reach; Patient at risk for falls; Left in bed (Waffle mattress reinflated at end of session and B heels elevated off bed with pillows for skin integrity)           Patient Diagnosis(es): The primary encounter diagnosis was Acute encephalopathy. Diagnoses of LEONARDA (acute kidney injury) ([address] and Urinary tract infection associated with catheterization of urinary tract, unspecified indwelling urinary catheter type, initial encounter [facility]) were also pertinent to this visit. has a past medical history of Anxiety and depression, Back pain, chronic, BPH (benign prostatic hyperplasia), Cellulitis of left lower extremity, Cellulitis of right lower extremity, CHF (congestive heart failure) ([address] Cirrhosis ([address] Diabetes mellitus ([address] Epididymoorchitis, GERD (gastroesophageal reflux disease), H/O cardiac catheterization, Hepatitis, Hiatal hernia, History of alcohol abuse, Hyperlipidemia, Hypertension, IBS (irritable bowel syndrome), Incisional hernia with obstruction but no gangrene, Klebsiella sepsis ([address] Metabolic encephalopathy, Morbid obesity ([address] Neuropathy, On home oxygen therapy, On home oxygen therapy, Pancreatitis, Poisoning by insulin and oral hypoglycemic (antidiabetic) drugs, accidental (unintentional), initial encounter, Primary osteoarthritis of right knee, Retention, urine, SBO (small bowel obstruction) ([address] Septic shock ([address] Sleep apnea, Sleep apnea, obstructive, Thyroid disease, Urinary bladder neurogenic dysfunction, and Urinary tract infection associated with indwelling urethral catheter ([address] has a past surgical history that includes Colonoscopy;  Abdomen surgery; hernia repair; Endoscopy, colon, diagnostic; Upper gastrointestinal endoscopy (07/19/2017); pr egd transoral biopsy single/multiple (N/A, 7/19/2017); pr deep dissec foot infec,1 bursa (Bilateral, 8/22/2017); Cystoscopy (01/24/2018); pr cystourethroscopy (N/A, 1/24/2018); Incisional hernia repair (05/20/2018); ventral hernia repair (N/A, 5/20/2018); Cystocopy (02/20/2019); Cystoscopy (N/A, 2/20/2019); Upper gastrointestinal endoscopy (N/A, 3/14/2019); and Cystoscopy (N/A, 8/23/2019). PER H&P: Edy Whitney is a 61 y.o. Non- / non  male who presents with Altered Mental Status   and is admitted to the hospital for the management of Sepsis ([facility] [address]    Per the ER note Patient is a 21-year-old male with a history of CHF and COPD who presented to the emergency department by EMS from home secondary to altered mental status.  History as per EMS who stated sinus pain and altered throughout the day no history of trauma or fall however when EMS arrived patient was alert answering questions appropriately and then fell asleep.  Patient stated he is on oxygen but she is used at all times.  States that he is vaccinated no known COVID exposure.  Denied any trauma or injury.  Patient denied difficulty speaking weakness.  Patient denied chest pain, shortness of breath, nausea, vomiting, fevers or chills     Patient was seen in our ER on 1/12/2022 related to dysuria. According to the notes his Clemons catheter was changed out. They document no urinary retention after replacement of Clemons. At that visit his BUN and creatinine were 27/3. 69. The ER note from the 12th also notes a blood pressure of 85/50 with a pulse of 66. Patient told the ER staff that he has been running low. Patient was discharged. Today the patient's bun/creatinine is 46/ 6.21. On review of his chart he was hospitalized in November 2021 with a creatinine of 4.62 that trended down to 1.8 after receiving fluids. October 2021 the patient was admitted with pneumonia.   According to the notes the patient developed LEONARDA from overdiuresis, ischemic ATN secondary to hypotension. At that time his creatinine bumped to 2.72 with fluids recovered to 1.48. Restrictions  Restrictions/Precautions  Restrictions/Precautions: General Precautions,Fall Risk,Up as Tolerated  Position Activity Restriction  Other position/activity restrictions: Clemons Chio, RON IV, up with assist, telemetry    Subjective   General  Chart Reviewed: Yes  Patient assessed for rehabilitation services?: Yes  Family / Caregiver Present: No  Subjective  Subjective: \"I'm not going to the olympics but I am able to get by.\"  General Comment  Comments: Pt resting in bed, agreeable to OT eval session. Patient Currently in Pain: Yes      Social/Functional History  Social/Functional History  Lives With: Son (30 y.o. son lives there \"he's not much help\")  Type of Home: House  Home Layout: One level  Home Access: Ramped entrance  Bathroom Shower/Tub: Walk-in shower  Bathroom Toilet: Standard  Bathroom Equipment: Built-in shower seat,Grab bars around toilet,Grab bars in [address]: [address] aid,Lift chair (uses RW in home, self-propels WC)  Receives Help From: Home health (aide 8 hours/day - grocery shopping, cooking, cleaning, laundry, showers & 2 nurses come to help w/ medicine, insulin & PT/OT come \"a couple times a week\")  ADL Assistance: Needs assistance  Homemaking Assistance: Needs assistance  Homemaking Responsibilities: No  Ambulation Assistance: Needs assistance  Transfer Assistance: Needs assistance  Active : No  Patient's  Info: medical transport  Occupation: On disability  Type of occupation:   Leisure & Hobbies: watch tv, sports, go visit friends  IADL Comments: sleeps in a recliner  Additional Comments: Pt reports \"sliding off the couch\" ~ 1 week ago.        Objective   Vision: Impaired Dylan Cervantes to get some new ones\")  Vision Exceptions: Wears glasses at all times  Hearing: Within functional limits Orientation  Overall Orientation Status: Within Functional Limits  Observation/Palpation  Posture: Fair  Observation: BMI 47, 350#, L foot externally rotated, dry/flakey skin noted  Edema: BLE's       Balance  Sitting Balance: Stand by assistance  Standing Balance: Maximum assistance (x2 with RW; upon initial stand at EOB pt had a severe posterior lean requiring physical assist to correct)  Standing Balance  Time: standing ~ 1-2 min  Activity: functional mobility  Comment: Pt sat down after functional mobility at EOB,  and SpO2 ~ 88%. Pt given cues for pursed lip breathing and proper posture. Functional Mobility  Functional - Mobility Device: Rolling Walker  Activity:  (3 small steps toward HOB)  Assist Level: Maximum assistance (x2 staff assist)  Functional Mobility Comments: Pt required MAX Verbal cues/tactile assist for RW safety, pacing self, upright posture, awareness/assist with lines, extending B UE       ADL  Feeding: Setup  Grooming: Setup;Stand by assistance (seated)  UE Bathing: Setup; Moderate assistance  LE Bathing: Setup;Maximum assistance  UE Dressing: Setup; Moderate assistance  LE Dressing: Maximum assistance (for donning B socks while seated on EOB pt able to lean forward and pull up sock once over heel, pt reports he uses sock aid at home)  Toileting: Maximum assistance  Additional Comments: Pt is limited by overall body habitus and fatigue       Tone RUE  RUE Tone: Normotonic  Tone LUE  LUE Tone: Normotonic  Coordination  Movements Are Fluid And Coordinated: No  Coordination and Movement description: Fine motor impairments; Left UE;Right UE;Decreased accuracy; Decreased speed        Bed mobility  Supine to Sit: Minimal assistance  Sit to Supine: Minimal assistance  Scooting: Minimal assistance  Comment: Pt required Mod verbal cues for pacing self, pursed lip breathing, awareness/assist with lines, use of bedrails and proper bed mobility all to increase safety.        Transfers  Sit to stand: 2 Person assistance;Maximum assistance (with RW)  Stand to sit: Maximum assistance;2 Person assistance  Transfer Comments: Pt required MAX verbal cues/tactile assist for RW safety, upright posture, controlled stand to sit, squaring self/AD up to bed and reaching back prior to sitting, pacing self, pursed lip breathing, and awareness/assist with lines all to reduce risk of falls. Cognition  Overall Cognitive Status: Exceptions  Arousal/Alertness: Appropriate responses to stimuli  Following Commands: Follows multistep commands with repitition; Follows multistep commands with increased time  Attention Span: Attends with cues to redirect  Memory: Appears intact  Safety Judgement: Decreased awareness of need for safety;Decreased awareness of need for assistance  Problem Solving: Decreased awareness of errors;Assistance required to identify errors made;Assistance required to correct errors made;Assistance required to implement solutions;Assistance required to generate solutions  Insights: Decreased awareness of deficits  Initiation: Requires cues for some  Sequencing: Requires cues for some        Sensation  Overall Sensation Status: Impaired (reports chronic numbness/tingling in B feet)        LUE AROM (degrees)  LUE AROM : WFL  RUE AROM (degrees)  RUE AROM : WFL  LUE Strength  Gross LUE Strength: WFL  LUE Strength Comment: ~ 4/5  RUE Strength  Gross RUE Strength: WFL  RUE Strength Comment: ~ 4/5                   Plan   Plan  Times per week: 4-5x/wk 1x/day as latrice  Current Treatment Recommendations: Strengthening,Endurance Training,Balance Training,Functional Mobility Training,Safety Education & Training,Self-Care / ADL,Equipment Evaluation, Education, & procurement,Patient/Caregiver Education & Training,Neuromuscular Re-education                          AM-PAC Score        AM-[facility] Inpatient Daily Activity Raw Score: 13 (01/20/22 1323)  AM-PAC Inpatient ADL T-Scale Score : 32.03 (01/20/22 1323)  ADL Inpatient CMS 0-100% Score: 63.03 (01/20/22 1323)  ADL Inpatient CMS G-Code Modifier : CL (01/20/22 1323)      Goals  Short term goals  Time Frame for Short term goals: By discharge, pt to demo  Short term goal 1: ADL transfers and functional mobility to Min A with use of RW as needed. Short term goal 2: bed mobility to SBA with use of bedrails as needed. Short term goal 3: increased B UE strength by 1/2 grade to assist with functional tasks/I with B UE HEP with use of handouts as needed. Short term goal 4: toileting to Mod A with use of AD/BSC as needed. Short term goal 5: UB ADLs to Min A and LB ADLs to Mod A with use of AD/AE as needed. Long term goals  Long term goal 1: Pt to be I with fall prevention education, EC/WS tech, discharge recommendations, healthy lifestyle mgmt education with use of handouts as needed. Patient Goals   Patient goals : To go home! Therapy Time   Individual Concurrent Group Co-treatment   Time In 1025         Time Out 1894         Minutes 53          tx time: 40 min    Co-treatment with PT warranted secondary to decreased safety and independence requiring 2 skilled therapy professionals to address individual discipline's goals.           Odalys Grewal, OT

## 2022-01-20 NOTE — PROGRESS NOTES
[facility]   Urology Progress Note            Subjective:  Follow-up chronic retention indwelling Clemons, catheter associated UTI    Patient Vitals for the past 24 hrs:   BP Temp Temp src Pulse Resp SpO2 Weight   01/20/22 0600 (!) 138/92 -- -- 59 16 98 % --   01/20/22 0500 (!) 154/67 -- -- 57 16 98 % --   01/20/22 0400 (!) 145/64 97.8 °F (36.6 °C) Temporal 63 13 95 % (!) 350 lb (158.8 kg)   01/20/22 0300 (!) 133/57 -- -- 63 16 98 % --   01/20/22 0220 -- -- -- -- 20 98 % --   01/20/22 0200 (!) 140/53 -- -- 62 17 97 % --   01/20/22 0100 127/64 -- -- 74 18 97 % --   01/20/22 0000 132/64 97.4 °F (36.3 °C) Temporal 76 17 97 % --   01/19/22 2343 -- -- -- -- 24 -- --   01/19/22 2121 -- -- -- -- 24 95 % --   01/19/22 2100 (!) 129/112 -- -- 79 22 95 % --   01/19/22 2000 (!) 145/64 96.8 °F (36 °C) Temporal 79 28 95 % --   01/19/22 1900 139/60 -- -- 91 24 92 % --   01/19/22 1800 (!) 162/73 -- -- 97 25 90 % --   01/19/22 1700 138/87 -- -- 69 25 95 % --   01/19/22 1600 (!) 146/73 98 °F (36.7 °C) Temporal 71 18 98 % --   01/19/22 1500 132/84 -- -- 72 15 95 % --   01/19/22 1400 (!) 145/63 -- -- 72 20 94 % --   01/19/22 1300 (!) 134/112 -- -- 77 20 93 % --   01/19/22 1200 (!) 150/67 98.7 °F (37.1 °C) Temporal 74 24 97 % --   01/19/22 1100 139/60 -- -- 76 20 95 % --   01/19/22 1000 (!) 130/54 -- -- 68 18 94 % --   01/19/22 0800 (!) 141/68 98.9 °F (37.2 °C) Temporal 64 21 91 % --       Intake/Output Summary (Last 24 hours) at 1/20/2022 0707  Last data filed at 1/20/2022 0600  Gross per 24 hour   Intake --   Output 1600 ml   Net -1600 ml       Recent Labs     01/18/22 0444 01/19/22 0317 01/20/22  0326   WBC 6.7 7.9 6.6   HGB 10.3* 8.9* 9.0*   HCT 33.9* 28.9* 28.4*   MCV 86.9 85.3 83.8   * 123* 115*     Recent Labs     01/18/22 0444 01/19/22  0317 01/20/22  0326    134* 133*   K 5.0 4.7 4.5   * 102 101   CO2 24 24 22   BUN 27* 25* 27*   CREATININE 1.34* 1.08 1.17       No results for input(s): COLORU, PHUR, LABCAST, WBCUA, RBCUA, MUCUS, TRICHOMONAS, YEAST, BACTERIA, CLARITYU, SPECGRAV, LEUKOCYTESUR, UROBILINOGEN, Margo Cowper in the last 72 hours.     Invalid input(s): NITRATE, GLUCOSEUKETONESUAMORPHOUS    Additional Lab/culture results:    Physical Exam: Patient alert, sitting in bed, answering questions appropriately presently on BiPAP, creatinine stable at baseline over the past 2 days catheter draining well bladder decompressed    Interval Imaging Findings:    Impression:    Patient Active Problem List   Diagnosis    Alcoholic cirrhosis of liver ([facility] [address] sober since 2013    Peripheral edema    Bipolar disorder ([address]    Type 2 diabetes mellitus with diabetic neuropathy, without long-term current use of insulin ([address]    Essential hypertension, currently hypotensive    Dyslipidemia    Weakness    Hyponatremia    FABI (obstructive sleep apnea)    Primary osteoarthritis of right knee    Type 2 diabetes mellitus with diabetic neuropathy ([address]    Acquired hypothyroidism    Urine retention    Anemia    Slow transit constipation    Constipation    Iron deficiency anemia due to chronic blood loss    Gastroesophageal reflux disease without esophagitis    LEONARDA (acute kidney injury) ([address]    Secondary esophageal varices without bleeding ([address]    Portal hypertension ([address]    Morbid obesity with BMI of 50.0-59.9, adult (HCC)    Venous stasis dermatitis of both lower extremities    Cellulitis of perineum    Chronic indwelling Clemons catheter    Anxiety and depression    Back pain, chronic    BPH (benign prostatic hyperplasia)    Chronic diastolic congestive heart failure (HCC)    Cirrhosis (HCC)    Diabetes mellitus ([address]    GERD (gastroesophageal reflux disease)    Hepatitis    Hiatal hernia    Hypertension    IBS (irritable bowel syndrome)    Morbid obesity ([address]    Neuropathy    On home oxygen therapy    Sleep apnea    Thyroid disease    Urinary bladder neurogenic dysfunction    Urinary tract infection associated with indwelling urethral catheter (HCC)    Musculoskeletal immobility    Pyocystis    Myoclonic jerking    Thrombocytopenia (HCC)    Hypotension    Sepsis with acute hypercapnic respiratory failure and septic shock (HCC)    Acute kidney injury superimposed on chronic kidney disease (HCC)    Somnolence    Acute respiratory acidosis       Plan: Maintain indwelling Clemons    Yifan Pearson MD  7:07 AM 1/20/2022

## 2022-01-20 NOTE — CARE COORDINATION
DISCHARGE PLAN:    PT evaluated patient again today and recommends SNF. SW is working on [facility] upon discharge. Patient lives with son. Son wants to check into long term. Patient can be discharged after last dose of antibiotic tomorrow. He is off precedex at this time. Sitter was d/c'd this morning. Cont' to follow.

## 2022-01-20 NOTE — PROGRESS NOTES
[facility]  Progress Note    Ana Self  MRN: [id number]  Date: 1/20/2022  LOS: 5     Reason for F/U :   UTI    Impression :   1. Encephalopathy, likely toxic metabolic  2. Acute on chronic respiratory failure-hypercapnic  · On chronic home O2 therapy  · He has been on BiPAP  3. Morbid obesity with obstructive sleep apnea  4. Sepsis with concern for septic shock  5. Alcoholic cirrhosis  6. Diabetes mellitus type 2 with associated diabetic neuropathy  7. Essential hypertension  8. Venous stasis dermatitis of lower extremities-chronic  9. Urinary retention with a chronic indwelling Clemons catheter and concern for catheter associated urinary tract infection  · Urine culture with Pseudomonas aeruginosa and Enterococcus species, unclear if these are pathogens or colonizers  10. Diastolic congestive heart failure    Recommendations:   · Patient continues on meropenem, started 1/15/2022  · Plan will be to continue a 7-day course, through 1/21/2022  · Continue supportive care    Infection Control Recommendations:   Universal precautions    Discharge Planning:   Estimated Length of IV antimicrobials: 7 days, 1/21/2022  Patient will need Midline Catheter Insertion/ PICC line Insertion: No  Patient will need: Home IV , [place],  SNF,  LTAC: Undetermined  Patient willneed outpatient wound care: No    Medical Decision making / Summary of Stay:   Stephenie Hearing a 61y.o.-year-old male who was initially admitted on 1/15/2022.  Ruben has a history of morbid obesity with a BMI of 57, diabetes mellitus type 2, essential hypertension, congestive heart failure, urinary retention and has an indwelling Clemons catheter, chronic pancreatitis, among other medical problems. The patient has been hospitalized multiple times and he presented to the hospital due to altered mental status. The patient apparently was answering questions appropriately when EMS arrived but subsequently fell asleep.   He is on home oxygen and in the emergency department the patient was confused. Work-up did include a CT scan of the abdomen pelvis that showed right-sided nonobstructive for lithiasis, cirrhotic liver morphology with sequelae of portal hypertension. A CT of the head showed no acute intracranial abnormality. VQ scan showed low probability for pulmonary embolism  Chest x-ray showed hypoinflated lungs with cardiomegaly again seen no acute consolidation or infiltrate and probable right lung base atelectasis. The patient was admitted for acute encephalopathy, acute kidney injury, and concern for urinary tract infection.     The patient is on BiPAP at 30% FiO2. He has been quite agitated is actually restrained at the wrist bilaterally and is on Precedex drip. Current evaluation:2022    BP (!) 144/60   Pulse 59   Temp 97.8 °F (36.6 °C) (Temporal)   Resp 16   Ht 6' (1.829 m)   Wt (!) 350 lb (158.8 kg)   SpO2 96%   BMI 47.47 kg/m²     Temperature Range: Temp: 97.8 °F (36.6 °C) Temp  Av.7 °F (36.5 °C)  Min: 96.8 °F (36 °C)  Max: 98.7 °F (37.1 °C)    Patient was seen and evaluated at bedside  He just finished on the bedpan  Denies any pain or needs at this time  He would like to go home as opposed to a facility  No fevers or chills      Review of Systems   Constitutional: Negative. HENT: Negative. Respiratory: Negative. Cardiovascular: Negative. Gastrointestinal: Negative. Genitourinary: Negative. Musculoskeletal: Negative. Skin: Negative. Neurological: Negative. Psychiatric/Behavioral: Negative. Physical Examination :     Physical Exam  Constitutional:       General: He is not in acute distress. Appearance: Normal appearance. He is obese. HENT:      Head: Normocephalic and atraumatic. Cardiovascular:      Rate and Rhythm: Normal rate and regular rhythm. Pulmonary:      Effort: Pulmonary effort is normal. No respiratory distress. Breath sounds: Normal breath sounds. Abdominal:      General: Abdomen is flat. Bowel sounds are normal.      Palpations: Abdomen is soft. Musculoskeletal:         General: Normal range of motion. Skin:     General: Skin is warm and dry. Coloration: Skin is not jaundiced. Neurological:      General: No focal deficit present. Mental Status: He is alert and oriented to person, place, and time. Mental status is at baseline. Laboratory data:   I have independently reviewed the followinglabs:  CBC with Differential:   Recent Labs     01/19/22 0317 01/20/22  0326   WBC 7.9 6.6   HGB 8.9* 9.0*   HCT 28.9* 28.4*   * 115*     BMP:   Recent Labs     01/19/22 0317 01/20/22  0326   * 133*   K 4.7 4.5    101   CO2 24 22   BUN 25* 27*   CREATININE 1.08 1.17     Hepatic Function Panel: No results for input(s): PROT, LABALBU, BILIDIR, IBILI, BILITOT, ALKPHOS, ALT, AST in the last 72 hours. Lab Results   Component Value Date    PROCAL 0.34 01/15/2022    PROCAL 0.42 01/15/2022    PROCAL 2.33 10/21/2021     Lab Results   Component Value Date    CRP 98.8 01/15/2022    CRP 42.3 11/06/2020    CRP 74.3 08/20/2017     Lab Results   Component Value Date    SEDRATE 77 (H) 11/06/2020         Lab Results   Component Value Date    DDIMER 1.01 01/15/2022    DDIMER 2.49 05/04/2016     Lab Results   Component Value Date    FERRITIN 160 01/15/2022    FERRITIN 109 05/07/2016     Lab Results   Component Value Date     01/15/2022     No results found for: FIBRINOGEN    Results in Past 30 Days  Result Component Current Result Ref Range Previous Result Ref Range   SARS-CoV-2, Rapid Not Detected (1/15/2022) Not Detected Not in Time Range      Lab Results   Component Value Date    COVID19 Not Detected 01/15/2022    COVID19 Not Detected 11/04/2021    COVID19 Not Detected 10/21/2021       No results for input(s): VANCOTROUGH in the last 72 hours.     Imaging Studies:     No new imaging    Cultures:     Culture, Blood 1 [[id number]] some errors including those of syntax andsound a like substitutions which may escape proof reading. In such instances, actual meaning can be extrapolated by contextual diversion.

## 2022-01-20 NOTE — PROGRESS NOTES
torsemide (DEMADEX) tablet 10 mg, Daily  predniSONE (DELTASONE) tablet 40 mg, Daily  DOPamine (INTROPIN) 400 mg in dextrose 5 % 250 mL infusion, Continuous  clonazePAM (KLONOPIN) tablet 0.5 mg, BID  fentaNYL (SUBLIMAZE) injection 25 mcg, Q1H PRN  LORazepam (ATIVAN) injection 1 mg, Q4H PRN  dexmedetomidine (PRECEDEX) 1,000 mcg in sodium chloride 0.9 % 250 mL infusion, Continuous  midodrine (PROAMATINE) tablet 10 mg, TID  ipratropium-albuterol (DUONEB) nebulizer solution 1 ampule, 4x daily  0.9 % sodium chloride bolus, Once  0.9 % sodium chloride infusion, PRN  albuterol sulfate  (90 Base) MCG/ACT inhaler 2 puff, Q6H PRN  aspirin EC tablet 81 mg, Daily  DULoxetine (CYMBALTA) extended release capsule 60 mg, QAM  pantoprazole (PROTONIX) tablet 40 mg, QAM AC  levothyroxine (SYNTHROID) tablet 175 mcg, Daily  oxybutynin (DITROPAN) tablet 5 mg, BID  tamsulosin (FLOMAX) capsule 0.4 mg, Daily  sodium chloride flush 0.9 % injection 5-40 mL, 2 times per day  sodium chloride flush 0.9 % injection 5-40 mL, PRN  0.9 % sodium chloride infusion, PRN  ondansetron (ZOFRAN-ODT) disintegrating tablet 4 mg, Q8H PRN   Or  ondansetron (ZOFRAN) injection 4 mg, Q6H PRN  acetaminophen (TYLENOL) tablet 650 mg, Q6H PRN   Or  acetaminophen (TYLENOL) suppository 650 mg, Q6H PRN  heparin (porcine) injection 5,000 Units, 3 times per day  insulin lispro (HUMALOG) injection vial 0-6 Units, TID WC  insulin lispro (HUMALOG) injection vial 0-3 Units, Nightly  glucose (GLUTOSE) 40 % oral gel 15 g, PRN  dextrose 50 % IV solution, PRN  glucagon (rDNA) injection 1 mg, PRN  dextrose 5 % solution, PRN  meropenem (MERREM) 500 mg IVPB (mini-bag), Q12H  norepinephrine (LEVOPHED) 16 mg in dextrose 5% 250 mL infusion, Continuous          LABS      CBC:   Recent Labs     01/18/22  0444 01/19/22  0317 01/20/22  0326   WBC 6.7 7.9 6.6   RBC 3.90* 3.39* 3.39*   HGB 10.3* 8.9* 9.0*   HCT 33.9* 28.9* 28.4*   MCV 86.9 85.3 83.8   MCH 26.4 26.3 26.5   MCHC 30.4 30.8 31.7   RDW 16.5* 16.7* 16.2*   * 123* 115*   MPV 10.5 10.2 10.4      BMP:   Recent Labs     01/18/22  0444 01/19/22  0317 01/20/22  0326    134* 133*   K 5.0 4.7 4.5   * 102 101   CO2 24 24 22   BUN 27* 25* 27*   CREATININE 1.34* 1.08 1.17   GLUCOSE 209* 199* 266*   CALCIUM 9.4 8.2* 8.3*       MARISA:   Lab Results   Component Value Date    MARISA NEGATIVE 11/03/2021       SPEP:   Lab Results   Component Value Date    PROT 6.7 01/16/2022    ALBCAL 3.3 11/03/2021    ALBPCT 51 11/03/2021    LABALPH 0.2 11/03/2021    LABALPH 0.6 11/03/2021    A1PCT 3 11/03/2021    A2PCT 9 11/03/2021    LABBETA 1.0 11/03/2021    BETAPCT 15 11/03/2021    GAMGLOB 1.5 11/03/2021    GGPCT 23 11/03/2021    PATH ELECTRONICALLY SIGNED.  Theresa Ivan M.D. 01/15/2022     UPEP:   Lab Results   Component Value Date     01/15/2022     01/15/2022      HEPBSAG:  Lab Results   Component Value Date    HEPBSAG NONREACTIVE 06/19/2014     HEPCAB:  Lab Results   Component Value Date    HEPCAB NONREACTIVE 06/19/2014     C3:   Lab Results   Component Value Date    C3 94 11/03/2021     C4:   Lab Results   Component Value Date    C4 21 11/03/2021     URINE SODIUM:    Lab Results   Component Value Date    TICO <20 01/15/2022      URINE CREATININE:    Lab Results   Component Value Date    LABCREA 94.2 11/03/2021     URINE PROTEIN:    Lab Results   Component Value Date     01/15/2022     01/15/2022     URINALYSIS:  U/A:   Lab Results   Component Value Date    NITRU POSITIVE 01/15/2022    COLORU PADMA 01/15/2022    PHUR 5.0 01/15/2022    WBCUA TOO NUMEROUS TO COUNT 01/15/2022    RBCUA TOO NUMEROUS TO COUNT 01/15/2022    MUCUS NOT REPORTED 01/15/2022    TRICHOMONAS NOT REPORTED 01/15/2022    YEAST NOT REPORTED 01/15/2022    BACTERIA MANY 01/15/2022    CLARITYU slightly cloudy 05/04/2016    SPECGRAV 1.037 01/15/2022    LEUKOCYTESUR MOD 01/15/2022    UROBILINOGEN Normal 01/15/2022    BILIRUBINUR  01/15/2022

## 2022-01-20 NOTE — PROGRESS NOTES
[facility]  Office: [address], DO, Pasquale Lopes, DO, Maria Jones, DO, Alejo Anderson, DO, Barby Tucker MD, Kia Westfall MD, William Geiger MD, Emmy Owens MD, Aston Jean Baptiste MD, Anastacia Newman MD, Haylee Linares MD, Martha Walker, DO, Chente Severino, DO, Sonam Painting MD,  Marielos Ferrer, DO, Olga Reis MD, Nunu Bloom MD, Oza Hatchet, MD, Ganesh Canales MD, Keyona Dial MD, Cy Man MD, Shalom Rodriguez MD, Zelda Schirmer, [facility], [facility], CNP, Blessing Vasquez, CNP, Garfield Cobb, CNS, Therese Nash, CNP, Matthew Sarabia, CNP, Marsha Jerry, CNP, Dino Alves, CNP, Nettie Palumbo, CNP, Jesús Noriega PA-C, Rivka Barraza, [facility], Tea Haney, [facility], Francoise Denise, CNP, Caitlin Duvall, CNP, Olivia García, CNP, Jan Cid, CNP, Letty Goodrich, CNP, [facility]    Progress Note    1/20/2022    3:36 PM    Name:   Leim Hamman  MRN:     [id number]     Acct:      [de-identified]   Room:   2031/2031-01  IP Day:  5  Admit Date:  1/15/2022  1:31 PM    PCP:   Aida Farrell MD  Code Status:  Full Code    Subjective:     C/C:   Chief Complaint   Patient presents with   Karla Brewster Altered Mental Status     Interval History Status:   Patient is less confused today, oriented to place and person  States he makes his own decisions and does not want to go to [facility] as mentioned by his son Timo Ross he has home care nurse taking care of him at home  earlier  He is on BiPAP off and on  Has chronic indwelling Clemons's catheter, urine culture growing Pseudomonas and Enterococcus, currently getting IV antibiotics-meropenem, stop date 1/21/2022  CT scan showed nonobstructive right kidney stone-urology evaluating  Serum creatinine improved to 1.17  Is off pressor support  Is off Precedex drip since yesterday and is getting oral Klonopin twice daily  Started on Demadex today by nephrology and they have signed off  Brief History:   Per my MARK:  \"Ruben Dimas is a 61 y.o. Non- / non  male who presents with Altered Mental Status   and is admitted to the hospital for the management of Sepsis ([facility] [address]    Per the ER note Patient is a 23-year-old male with a history of CHF and COPD who presented to the emergency department by EMS from home secondary to altered mental status.  History as per EMS who stated sinus pain and altered throughout the day no history of trauma or fall however when EMS arrived patient was alert answering questions appropriately and then fell asleep.  Patient stated he is on oxygen but is not used at all times.  States that he is vaccinated; no known COVID exposure.  Denied any trauma or injury.  Patient denied difficulty speaking or weakness.  Patient denied chest pain, shortness of breath, nausea, vomiting, fevers or chills     Patient was seen in our ER on 1/12/2022 related to dysuria.  According to the notes his Clemons catheter was changed out. Shonna Young document no urinary retention after replacement of Clemons.  At that visit his BUN and creatinine were 27/3. 71.  The ER note from the 12th also notes a blood pressure of 85/50 with a pulse of 66.  Patient told the ER staff that he has been running low.  Patient was discharged.  [facility] the patient's bun/creatinine is 46/ 6. 24.  On review of his chart he was hospitalized in November 2021 with a creatinine of 4.62 that trended down to 1.8 after receiving fluids. Jacey Farr 2021 the patient was admitted with pneumonia.  According to the notes the patient developed LEONARDA from overdiuresis, ischemic ATN secondary to hypotension.  At that time his creatinine bumped to 2.72 with fluids recovered to 1.48      Review of Systems:     Unable to obtain, patient is confused although better than before, knows he is in hospital    Medications: Allergies:     Allergies   Allergen Reactions    No Known Allergies        Current Meds:   Scheduled Meds:    torsemide  10 mg Oral Daily    predniSONE  40 mg Oral Daily    clonazePAM  0.5 mg Oral BID    midodrine  10 mg Oral TID    sodium chloride  1,000 mL IntraVENous Once    aspirin  81 mg Oral Daily    DULoxetine  60 mg Oral QAM    pantoprazole  40 mg Oral QAM AC    levothyroxine  175 mcg Oral Daily    oxybutynin  5 mg Oral BID    tamsulosin  0.4 mg Oral Daily    sodium chloride flush  5-40 mL IntraVENous 2 times per day    heparin (porcine)  5,000 Units SubCUTAneous 3 times per day    insulin lispro  0-6 Units SubCUTAneous TID WC    insulin lispro  0-3 Units SubCUTAneous Nightly    meropenem  500 mg IntraVENous Q12H     Continuous Infusions:    DOPamine Stopped (01/18/22 0850)    dexmedetomidine (PRECEDEX) IV infusion Stopped (01/19/22 1104)    sodium chloride      sodium chloride      dextrose      norepinephrine Stopped (01/17/22 1315)     PRN Meds: ipratropium-albuterol, fentanNYL, LORazepam, sodium chloride, albuterol sulfate HFA, sodium chloride flush, sodium chloride, ondansetron **OR** ondansetron, acetaminophen **OR** acetaminophen, glucose, dextrose, glucagon (rDNA), dextrose    Data:     Past Medical History:   has a past medical history of Anxiety and depression, Back pain, chronic, BPH (benign prostatic hyperplasia), Cellulitis of left lower extremity, Cellulitis of right lower extremity, CHF (congestive heart failure) ([facility] [address] Cirrhosis ([address] Diabetes mellitus ([facility] [address] Epididymoorchitis, GERD (gastroesophageal reflux disease), H/O cardiac catheterization, Hepatitis, Hiatal hernia, History of alcohol abuse, Hyperlipidemia, Hypertension, IBS (irritable bowel syndrome), Incisional hernia with obstruction but no gangrene, Klebsiella sepsis ([address] Metabolic encephalopathy, Morbid obesity ([address] Neuropathy, On home oxygen therapy, On home oxygen therapy, Pancreatitis, Poisoning by insulin and oral hypoglycemic (antidiabetic) drugs, accidental (unintentional), initial encounter, Primary osteoarthritis of right knee, Retention, urine, SBO (small bowel obstruction) ([facility] [address] Septic shock ([facility] [address] Sleep apnea, Sleep apnea, obstructive, Thyroid disease, Urinary bladder neurogenic dysfunction, and Urinary tract infection associated with indwelling urethral catheter ([facility] [address] Social History:   reports that he has never smoked. He has never used smokeless tobacco. He reports that he does not drink alcohol and does not use drugs. Family History:   Family History   Adopted: Yes   Problem Relation Age of Onset    No Known Problems Mother         Adopted    No Known Problems Father         Adopted       Vitals:  BP (!) 157/69   Pulse 81   Temp 96.7 °F (35.9 °C) (Temporal)   Resp 22   Ht 6' (1.829 m)   Wt (!) 350 lb (158.8 kg)   SpO2 93%   BMI 47.47 kg/m²   Temp (24hrs), Av.4 °F (36.3 °C), Min:96.7 °F (35.9 °C), Max:98 °F (36.7 °C)    Recent Labs     22  0354 22  0632 22  0800 22  1151   POCGLU 237* 211* 178* 234*       I/O (24Hr):     Intake/Output Summary (Last 24 hours) at 2022 1536  Last data filed at 2022 1506  Gross per 24 hour   Intake --   Output 2900 ml   Net -2900 ml       Labs:  Hematology:  Recent Labs     22  0444 22  0317 22  0326   WBC 6.7 7.9 6.6   RBC 3.90* 3.39* 3.39*   HGB 10.3* 8.9* 9.0*   HCT 33.9* 28.9* 28.4*   MCV 86.9 85.3 83.8   MCH 26.4 26.3 26.5   MCHC 30.4 30.8 31.7   RDW 16.5* 16.7* 16.2*   * 123* 115*   MPV 10.5 10.2 10.4     Chemistry:  Recent Labs     22  0444 22  0317 22  0326    134* 133*   K 5.0 4.7 4.5   * 102 101   CO2 24 24 22   GLUCOSE 209* 199* 266*   BUN 27* 25* 27*   CREATININE 1.34* 1.08 1.17   ANIONGAP 10 8* 10   LABGLOM 55* >60 >60   GFRAA >60 >60 >60   CALCIUM 9.4 8.2* 8.3*     Recent Labs     22  1943 22  2346 22  0354 22  0632 22  0800 22  1151   POCGLU 242* 287* 237* 211* 178* 234*     ABG:  Lab Results   Component Value Date POCPH 7.353 01/17/2022    PHART 7.330 06/18/2014    POCPCO2 51.5 01/17/2022    XUA3ZNV 68.0 06/18/2014    POCPO2 82.2 01/17/2022    PO2ART 84.0 06/18/2014    POCHCO3 28.6 01/17/2022    SCZ6QMD 34.9 06/18/2014    NBEA NOT REPORTED 01/17/2022    PBEA 2 01/17/2022    ZXP0KCK NOT REPORTED 01/17/2022    XUFY2GRW 95 01/17/2022    M0KGKTRB 96.5 06/18/2014    FIO2 28.0 01/17/2022     Lab Results   Component Value Date/Time    SPECIAL L HAND 10ML 01/15/2022 05:00 PM     Lab Results   Component Value Date/Time    CULTURE NO GROWTH 4 DAYS 01/15/2022 05:00 PM       Radiology:  CT ABDOMEN PELVIS WO CONTRAST Additional Contrast? None    Result Date: 1/15/2022  1. Right nonobstructive nephrolithiasis. 2. Cirrhotic liver morphology with sequela of portal hypertension. Consider nonemergent liver MRI with [facility] for [facility] [address] screening. CT Head WO Contrast    Result Date: 1/15/2022  No acute intracranial abnormality. Senescent changes including mild cortical atrophy. NM LUNG SCAN PERFUSION ONLY    Result Date: 1/15/2022  Low Probability for Pulmonary Embolus. XR CHEST PORTABLE    Result Date: 1/17/2022  No radiologic evidence of acute cardiopulmonary disease. XR CHEST PORTABLE    Result Date: 1/15/2022  Hypoinflated lungs. Cardiomegaly again seen, when compared to the previous study performed 11/02/2021. No acute consolidation or infiltrate. Probable right lung base atelectasis. US RETROPERITONEAL COMPLETE    Result Date: 1/17/2022  Markedly limited assessment due to poor sonographic window. No gross hydronephrosis. Bladder not visualized.        Physical Examination:        General appearance:  alert, obese, no distress  Mental Status: Confused, oriented to self, knows he is in hospital  Lungs:  clear to auscultation bilaterally, normal effort  Heart:  regular rate and rhythm, no murmur  Abdomen:  soft, nontender, nondistended, normal bowel sounds, no masses, hepatomegaly, splenomegaly  Extremities:  no edema, redness, tenderness in the calves  Skin:  + Venous stasis changes both lower extremities    Assessment:        Hospital Problems           Last Modified POA    * (Principal) Sepsis with acute hypercapnic respiratory failure and septic shock ([address] 0/52/7420 Yes    Alcoholic cirrhosis of liver ([address] sober since 2013 1/15/2022 Yes    Bipolar disorder ([address] 1/15/2022 Yes    Type 2 diabetes mellitus with diabetic neuropathy, without long-term current use of insulin ([address] 1/15/2022 Yes    Essential hypertension, currently hypotensive 1/15/2022 Yes    FABI (obstructive sleep apnea) 1/15/2022 Yes    Type 2 diabetes mellitus with diabetic neuropathy ([address] (Chronic) 1/15/2022 Yes    Acquired hypothyroidism 1/15/2022 Yes    Venous stasis dermatitis of both lower extremities (Chronic) 1/15/2022 Yes    Chronic indwelling Clemons catheter (Chronic) 1/15/2022 Yes    BPH (benign prostatic hyperplasia) 1/15/2022 Yes    Chronic diastolic congestive heart failure ([address] 1/16/2022 Yes    On home oxygen therapy 1/15/2022 Yes    Overview Signed 11/4/2020  2:08 PM by SUNSHINE Mazariegos - NP     prn         Urinary bladder neurogenic dysfunction 1/15/2022 Yes    Urinary tract infection associated with indwelling urethral catheter ([address] 1/15/2022 Yes    Acute kidney injury superimposed on chronic kidney disease ([address] 1/15/2022 Yes    Somnolence 1/15/2022 Yes    Acute respiratory acidosis 1/16/2022 Yes          Plan:        1. Continue IV meropenem in consultation with ID-stop date 1/21/2022  2. BiPAP as needed if he cooperates  3. Monitor renal function, nephrology following, recommended stop IV fluids and start Demadex 10 mg daily from today  4. Neurology also evaluated the patient  5. Urology recommended to keep indwelling catheter, patient will need cystoscopy eventually  6. Discontinued Precedex drip yesterday, started on oral Klonopin by critical care   7.  Will discharge back [facility] or home with home care in next 1 to 2 days when more stable    Gardenia Bedoya MD  1/20/2022  3:36 PM

## 2022-01-20 NOTE — PROGRESS NOTES
Pulmonary Critical Care Progress Note  Rambo Jaramillo MD     Patient seen for the follow up of  Sepsis with acute hypercapnic respiratory failure and septic shock ([address]     Subjective:  He uneventful night, wore BiPAP overnight last night. He is alert and cooperative with exam.  Off of Precedex drip. He is on room air saturating 98%. He denies shortness of breath or chest pain. He has occasional cough, clear sputum. Tolerating orals.     Examination:  Vitals: /83   Pulse 79   Temp 97.5 °F (36.4 °C)   Resp (!) 35   Ht 6' (1.829 m)   Wt (!) 350 lb (158.8 kg)   SpO2 98%   BMI 47.47 kg/m²   General appearance: alert and cooperative with exam, sitting up in the bed, no distress  Neck: No JVD  Lungs: Diminished, no crackles or wheezing  Heart: regular rate and rhythm, S1, S2 normal, no gallop  Abdomen: Soft, non tender, + BS  Extremities: no cyanosis or clubbing. +edema    LABs:  CBC:   Recent Labs     01/19/22 0317 01/20/22  0326   WBC 7.9 6.6   HGB 8.9* 9.0*   HCT 28.9* 28.4*   * 115*     BMP:   Recent Labs     01/19/22 0317 01/20/22  0326   * 133*   K 4.7 4.5   CO2 24 22   BUN 25* 27*   CREATININE 1.08 1.17   LABGLOM >60 >60   GLUCOSE 199* 266*     ABG:  Lab Results   Component Value Date    PHART 7.330 06/18/2014    UQZ3FFZ 68.0 06/18/2014    PO2ART 84.0 06/18/2014    LYL8VWZ 34.9 06/18/2014    EZR4DHT NOT REPORTED 01/17/2022    V5DQHAKR 96.5 06/18/2014    FIO2 28.0 01/17/2022       Lab Results   Component Value Date    POCPH 7.353 01/17/2022    PHART 7.330 06/18/2014    POCPCO2 51.5 01/17/2022    XRM6RBP 68.0 06/18/2014    POCPO2 82.2 01/17/2022    PO2ART 84.0 06/18/2014    POCHCO3 28.6 01/17/2022    FXV4MZT 34.9 06/18/2014    NBEA NOT REPORTED 01/17/2022    PBEA 2 01/17/2022    ZOZ2BCP NOT REPORTED 01/17/2022    UBSQ0OWW 95 01/17/2022    A1GDNGKN 96.5 06/18/2014    FIO2 28.0 01/17/2022     Radiology:  X-ray chest 1/17/2022      Impression:  Acute on chronic hypoxic/hypercarbic respiratory failure  Sepsis/septic shock  Obstructive sleep apnea/obesity hypoventilation  Atelectasis  Encephalopathy/CO2 narcosis  Chronic urinary retention with indwelling Clemons  Acute kidney injury/hepatorenal syndrome  Liver cirrhosis/history of alcoholism    Recommendations:  Oxygen via nasal cannula  BiPAP support as needed and while asleep  Precedex as needed for agitation, wean as able  Klonopin 0.5 mg twice daily  DuoNeb via nebulizer  Prednisone taper  Meropenem per infectious disease through 1/21/2022  Monitor blood pressure off Levophed  Demadex 10 mg daily ordered by nephrology  Urology following  DVT prophylaxis, subcu heparin  GI prophylaxis, Protonix  PT/OT  Discharge planning  We will follow with you      Electronically signed by     Raymon Victor MD on 1/20/2022 at 2:08 PM  Pulmonary Critical Care and Sleep Medicine,  [facility]  Cell: [phone]  Office: [phone]

## 2022-01-20 NOTE — CARE COORDINATION
Social work: Take the Interview is able to accept patient once sitter off for 24 hours, per [employer] removed at 9:13AM today. Igor/son updated. Patient will need updated COVID test prior to discharge. HENS needs completed.

## 2022-01-21 VITALS
TEMPERATURE: 97.3 F | RESPIRATION RATE: 18 BRPM | BODY MASS INDEX: 42.66 KG/M2 | OXYGEN SATURATION: 94 % | WEIGHT: 315 LBS | HEART RATE: 67 BPM | HEIGHT: 72 IN | SYSTOLIC BLOOD PRESSURE: 150 MMHG | DIASTOLIC BLOOD PRESSURE: 83 MMHG

## 2022-01-21 LAB
ANION GAP SERPL CALCULATED.3IONS-SCNC: 10 MMOL/L (ref 9–17)
BUN BLDV-MCNC: 25 MG/DL (ref 6–20)
BUN/CREAT BLD: 25 (ref 9–20)
CALCIUM SERPL-MCNC: 8.3 MG/DL (ref 8.6–10.4)
CHLORIDE BLD-SCNC: 102 MMOL/L (ref 98–107)
CO2: 23 MMOL/L (ref 20–31)
CREAT SERPL-MCNC: 1.02 MG/DL (ref 0.7–1.2)
GFR AFRICAN AMERICAN: >60 ML/MIN
GFR NON-AFRICAN AMERICAN: >60 ML/MIN
GFR SERPL CREATININE-BSD FRML MDRD: ABNORMAL ML/MIN/{1.73_M2}
GFR SERPL CREATININE-BSD FRML MDRD: ABNORMAL ML/MIN/{1.73_M2}
GLUCOSE BLD-MCNC: 146 MG/DL (ref 75–110)
GLUCOSE BLD-MCNC: 173 MG/DL (ref 75–110)
GLUCOSE BLD-MCNC: 186 MG/DL (ref 75–110)
GLUCOSE BLD-MCNC: 215 MG/DL (ref 75–110)
GLUCOSE BLD-MCNC: 223 MG/DL (ref 70–99)
GLUCOSE BLD-MCNC: 267 MG/DL (ref 75–110)
HCT VFR BLD CALC: 30.1 % (ref 40.7–50.3)
HEMOGLOBIN: 9.5 G/DL (ref 13–17)
MCH RBC QN AUTO: 26.5 PG (ref 25.2–33.5)
MCHC RBC AUTO-ENTMCNC: 31.6 G/DL (ref 28.4–34.8)
MCV RBC AUTO: 83.8 FL (ref 82.6–102.9)
NRBC AUTOMATED: 0 PER 100 WBC
PDW BLD-RTO: 15.9 % (ref 11.8–14.4)
PLATELET # BLD: 88 K/UL (ref 138–453)
PMV BLD AUTO: 10.2 FL (ref 8.1–13.5)
POTASSIUM SERPL-SCNC: 3.9 MMOL/L (ref 3.7–5.3)
RBC # BLD: 3.59 M/UL (ref 4.21–5.77)
SODIUM BLD-SCNC: 135 MMOL/L (ref 135–144)
WBC # BLD: 7 K/UL (ref 3.5–11.3)

## 2022-01-21 PROCEDURE — 82947 ASSAY GLUCOSE BLOOD QUANT: CPT

## 2022-01-21 PROCEDURE — 94660 CPAP INITIATION&MGMT: CPT

## 2022-01-21 PROCEDURE — 85027 COMPLETE CBC AUTOMATED: CPT

## 2022-01-21 PROCEDURE — 6370000000 HC RX 637 (ALT 250 FOR IP): Performed by: NURSE PRACTITIONER

## 2022-01-21 PROCEDURE — 6360000002 HC RX W HCPCS: Performed by: INTERNAL MEDICINE

## 2022-01-21 PROCEDURE — 6370000000 HC RX 637 (ALT 250 FOR IP): Performed by: INTERNAL MEDICINE

## 2022-01-21 PROCEDURE — 99232 SBSQ HOSP IP/OBS MODERATE 35: CPT | Performed by: INTERNAL MEDICINE

## 2022-01-21 PROCEDURE — 6360000002 HC RX W HCPCS: Performed by: NURSE PRACTITIONER

## 2022-01-21 PROCEDURE — 99239 HOSP IP/OBS DSCHRG MGMT >30: CPT | Performed by: INTERNAL MEDICINE

## 2022-01-21 PROCEDURE — 94761 N-INVAS EAR/PLS OXIMETRY MLT: CPT

## 2022-01-21 PROCEDURE — 2700000000 HC OXYGEN THERAPY PER DAY

## 2022-01-21 PROCEDURE — 36415 COLL VENOUS BLD VENIPUNCTURE: CPT

## 2022-01-21 PROCEDURE — 2580000003 HC RX 258: Performed by: NURSE PRACTITIONER

## 2022-01-21 PROCEDURE — 80048 BASIC METABOLIC PNL TOTAL CA: CPT

## 2022-01-21 RX ORDER — MIDODRINE HYDROCHLORIDE 10 MG/1
10 TABLET ORAL 3 TIMES DAILY
Qty: 90 TABLET | Refills: 0 | Status: SHIPPED | OUTPATIENT
Start: 2022-01-21

## 2022-01-21 RX ORDER — TORSEMIDE 10 MG/1
10 TABLET ORAL DAILY
Qty: 30 TABLET | Refills: 1 | Status: SHIPPED | OUTPATIENT
Start: 2022-01-22

## 2022-01-21 RX ADMIN — CLONAZEPAM 0.5 MG: 0.5 TABLET ORAL at 10:48

## 2022-01-21 RX ADMIN — ASPIRIN 81 MG: 81 TABLET, COATED ORAL at 10:48

## 2022-01-21 RX ADMIN — LORAZEPAM 1 MG: 2 INJECTION INTRAMUSCULAR; INTRAVENOUS at 04:23

## 2022-01-21 RX ADMIN — FENTANYL CITRATE 25 MCG: 50 INJECTION, SOLUTION INTRAMUSCULAR; INTRAVENOUS at 18:15

## 2022-01-21 RX ADMIN — PANTOPRAZOLE SODIUM 40 MG: 40 TABLET, DELAYED RELEASE ORAL at 06:17

## 2022-01-21 RX ADMIN — HEPARIN SODIUM 5000 UNITS: 5000 INJECTION INTRAVENOUS; SUBCUTANEOUS at 06:16

## 2022-01-21 RX ADMIN — PREDNISONE 40 MG: 20 TABLET ORAL at 10:49

## 2022-01-21 RX ADMIN — OXYBUTYNIN CHLORIDE 5 MG: 5 TABLET ORAL at 10:48

## 2022-01-21 RX ADMIN — TORSEMIDE 10 MG: 10 TABLET ORAL at 10:49

## 2022-01-21 RX ADMIN — MEROPENEM 500 MG: 500 INJECTION, POWDER, FOR SOLUTION INTRAVENOUS at 16:11

## 2022-01-21 RX ADMIN — DULOXETINE HYDROCHLORIDE 60 MG: 60 CAPSULE, DELAYED RELEASE ORAL at 10:48

## 2022-01-21 RX ADMIN — INSULIN LISPRO 3 UNITS: 100 INJECTION, SOLUTION INTRAVENOUS; SUBCUTANEOUS at 18:15

## 2022-01-21 RX ADMIN — INSULIN LISPRO 1 UNITS: 100 INJECTION, SOLUTION INTRAVENOUS; SUBCUTANEOUS at 12:50

## 2022-01-21 RX ADMIN — MEROPENEM 500 MG: 500 INJECTION, POWDER, FOR SOLUTION INTRAVENOUS at 06:16

## 2022-01-21 RX ADMIN — LEVOTHYROXINE SODIUM 175 MCG: 0.17 TABLET ORAL at 06:17

## 2022-01-21 RX ADMIN — FENTANYL CITRATE 25 MCG: 50 INJECTION, SOLUTION INTRAMUSCULAR; INTRAVENOUS at 02:35

## 2022-01-21 RX ADMIN — TAMSULOSIN HYDROCHLORIDE 0.4 MG: 0.4 CAPSULE ORAL at 10:48

## 2022-01-21 RX ADMIN — SODIUM CHLORIDE, PRESERVATIVE FREE 10 ML: 5 INJECTION INTRAVENOUS at 10:49

## 2022-01-21 RX ADMIN — INSULIN LISPRO 1 UNITS: 100 INJECTION, SOLUTION INTRAVENOUS; SUBCUTANEOUS at 10:52

## 2022-01-21 ASSESSMENT — ENCOUNTER SYMPTOMS
RESPIRATORY NEGATIVE: 1
ALLERGIC/IMMUNOLOGIC NEGATIVE: 1
GASTROINTESTINAL NEGATIVE: 1
BACK PAIN: 1

## 2022-01-21 ASSESSMENT — PAIN SCALES - GENERAL: PAINLEVEL_OUTOF10: 7

## 2022-01-21 NOTE — DISCHARGE SUMMARY
[facility]  Office: [address], DO, Pasquale Lopes, DO, Maria Jones, DO, Alejo Anderson, DO, Barby Tucker MD, Kia Westfall MD, William Geiger MD, Emmy Owens MD, Aston Jean Baptiste MD, Anastacia Newman MD, Haylee iLnares MD, Martha Walker, DO, Chente Severino, DO, Sonam Painting MD,  Marielos Ferrer, DO, Olga Reis MD, Nunu Bloom MD, Oza Hatchet, MD, Ganesh Canales MD, Keyona Dial MD, Cy Man MD, Shalom Rodriguez MD, Zelda Schirmer, [facility], [facility], CNP, Blessing Vasquez, CNP, Garfield Cobb, CNS, Therese Nash, CNP, Matthew Sarabia, CNP, Marsha Jerry, CNP, Dino Alves, CNP, Nettie Palumbo, CNP, Jesús Noriega PA-C, Rivka Barraza, [facility], Tea Haney, [facility], Francoise Denise, CNP, Caitlin Duvall, CNP, Olivia García, CNP, Jan Cid, CNP, Letty Goodrich, CNP, [facility]    Discharge Summary     Patient ID: Leim Hamman  :  1965   MRN: [id number]     ACCOUNT:  [de-identified]   Patient's PCP: Aida Farrell MD  Admit Date: 1/15/2022   Discharge Date: 2022     Length of Stay: 6  Code Status:  Full Code  Admitting Physician: Dayan Ariza MD  Discharge Physician: Dayan Ariza MD     Active Discharge Diagnoses:     Hospital Problem Lists:  Principal Problem:    Sepsis with acute hypercapnic respiratory failure and septic shock [facility])  Active Problems:    Alcoholic cirrhosis of liver ([facility] sober since     Bipolar disorder ([facility]    Type 2 diabetes mellitus with diabetic neuropathy, without long-term current use of insulin ([facility]    Essential hypertension, currently hypotensive    FABI (obstructive sleep apnea)    Type 2 diabetes mellitus with diabetic neuropathy ([facility]    Acquired hypothyroidism    Venous stasis dermatitis of both lower extremities    Chronic indwelling Clemons catheter    BPH (benign prostatic hyperplasia)    Chronic diastolic congestive heart failure ([address]    On home oxygen therapy    Urinary bladder neurogenic dysfunction    Urinary tract infection associated with indwelling urethral catheter (HCC)    Acute kidney injury superimposed on chronic kidney disease (HCC)    Somnolence    Acute respiratory acidosis  Resolved Problems:    * No resolved hospital problems. *      Admission Condition:  poor     Discharged Condition: stable    Hospital Stay:   Admitting history:  Per my MARK:  \"Ruben Dimas is a 61 y.o. Non- / non  male who presents with Altered Mental Status   and is admitted to the hospital for the management of Sepsis ([facility]    Per the ER note Patient is a 39-year-old male with a history of CHF and COPD who presented to the emergency department by EMS from home secondary to altered mental status.  History as per EMS who stated sinus pain and altered throughout the day no history of trauma or fall however when EMS arrived patient was alert answering questions appropriately and then fell asleep.  Patient stated he is on oxygen but is not used at all times.  States that he is vaccinated; no known COVID exposure.  Denied any trauma or injury.  Patient denied difficulty speaking or weakness.  Patient denied chest pain, shortness of breath, nausea, vomiting, fevers or chills     Patient was seen in our ER on 1/12/2022 related to dysuria.  According to the notes his Clemons catheter was changed out. Mak Hughes document no urinary retention after replacement of Clemons.  At that visit his BUN and creatinine were 27/3. 71.  The ER note from the 12th also notes a blood pressure of 85/50 with a pulse of 66.  Patient told the ER staff that he has been running low.  Patient was discharged.  [facility] the patient's bun/creatinine is 46/ 6. 24.  On review of his chart he was hospitalized in November 2021 with a creatinine of 4.62 that trended down to 1.8 after receiving fluids. Daisy Nguyen 2021 the patient was admitted with pneumonia.  According to the notes the patient developed LEONARDA LABGLOM >60 01/21/2022    GFRAA >60 01/21/2022    GFR      01/21/2022    GFR NOT REPORTED 01/21/2022     HFP:    Lab Results   Component Value Date    PROT 6.7 01/16/2022     CMP:    Lab Results   Component Value Date    GLUCOSE 223 01/21/2022    GLUCOSE 102 08/30/2011     01/21/2022    K 3.9 01/21/2022     01/21/2022    CO2 23 01/21/2022    BUN 25 01/21/2022    CREATININE 1.02 01/21/2022    ANIONGAP 10 01/21/2022    ALKPHOS 148 01/16/2022    ALT 11 01/16/2022    AST 12 01/16/2022    BILITOT 0.27 01/16/2022    LABALBU 3.0 01/16/2022    LABALBU 4.0 03/30/2012    ALBUMIN NOT REPORTED 01/16/2022    LABGLOM >60 01/21/2022    GFRAA >60 01/21/2022    GFR      01/21/2022    GFR NOT REPORTED 01/21/2022    PROT 6.7 01/16/2022    CALCIUM 8.3 01/21/2022     PT/INR:    Lab Results   Component Value Date    PROTIME 12.5 01/16/2022    PROTIME 11.6 03/30/2012    INR 0.9 01/16/2022     PTT:   Lab Results   Component Value Date    APTT 25.5 11/02/2021     FLP:    Lab Results   Component Value Date    CHOL 248 11/10/2020    TRIG 231 11/10/2020    HDL 40 11/10/2020     U/A:    Lab Results   Component Value Date    COLORU PADMA 01/15/2022    TURBIDITY Cloudy 01/15/2022    SPECGRAV 1.037 01/15/2022    HGBUR 3+ 01/15/2022    PHUR 5.0 01/15/2022    PROTEINU 2+ 01/15/2022    GLUCOSEU NEGATIVE 01/15/2022    GLUCOSEU 1+ 08/30/2011    KETUA 1+ 01/15/2022    BILIRUBINUR  01/15/2022     Presumptive positive. Unable to confirm due to unavailability of reagent. BILIRUBINUR neg 05/04/2016    BILIRUBINUR 3+ 08/30/2011    UROBILINOGEN Normal 01/15/2022    NITRU POSITIVE 01/15/2022    LEUKOCYTESUR MOD 01/15/2022     TSH:    Lab Results   Component Value Date    TSH 1.95 01/15/2022        Radiology:  CT ABDOMEN PELVIS WO CONTRAST Additional Contrast? None    Result Date: 1/15/2022  1. Right nonobstructive nephrolithiasis. 2. Cirrhotic liver morphology with sequela of portal hypertension.   Consider nonemergent liver MRI with [facility] for [facility]. screening. CT Head WO Contrast    Result Date: 1/15/2022  No acute intracranial abnormality. Senescent changes including mild cortical atrophy. NM LUNG SCAN PERFUSION ONLY    Result Date: 1/15/2022  Low Probability for Pulmonary Embolus. XR CHEST PORTABLE    Result Date: 1/17/2022  No radiologic evidence of acute cardiopulmonary disease. XR CHEST PORTABLE    Result Date: 1/15/2022  Hypoinflated lungs. Cardiomegaly again seen, when compared to the previous study performed 11/02/2021. No acute consolidation or infiltrate. Probable right lung base atelectasis. US RETROPERITONEAL COMPLETE    Result Date: 1/17/2022  Markedly limited assessment due to poor sonographic window. No gross hydronephrosis. Bladder not visualized. Consultations:    Consults:     Final Specialist Recommendations/Findings:   IP CONSULT TO INTERNAL MEDICINE  IP CONSULT TO INFECTIOUS DISEASES  IP CONSULT TO UROLOGY  IP CONSULT TO SOCIAL WORK  IP CONSULT TO PULMONOLOGY  IP CONSULT TO NEPHROLOGY      The patient was seen and examined on day of discharge and this discharge summary is in conjunction with any daily progress note from day of discharge. Discharge plan:     Disposition: Home with home care    Physician Follow Up:   PCP within 1 week    Requiring Further Evaluation/Follow Up POST HOSPITALIZATION/Incidental Findings:  Follow-up with urology as scheduled for deciding about cystoscopy    Diet: cardiac diet and diabetic diet    Activity: As tolerated    Instructions to Patient: Care of indwelling Clemons catheter    Discharge Medications:      Medication List      START taking these medications    midodrine 10 MG tablet  Commonly known as: PROAMATINE  Take 1 tablet by mouth 3 times daily Hold for systolic blood pressure greater than 110     torsemide 10 MG tablet  Commonly known as: DEMADEX  Take 1 tablet by mouth daily  Start taking on: January 22, 2022        CHANGE how you take these medications levothyroxine 175 MCG tablet  Commonly known as: SYNTHROID  What changed: Another medication with the same name was removed. Continue taking this medication, and follow the directions you see here. nystatin 926220 UNIT/GM cream  Commonly known as: MYCOSTATIN  What changed: Another medication with the same name was removed. Continue taking this medication, and follow the directions you see here.         CONTINUE taking these medications    aspirin 81 MG EC tablet  Take 1 tablet by mouth daily     Basaglar KwikPen 100 UNIT/ML injection pen  Generic drug: insulin glargine     clonazePAM 1 MG tablet  Commonly known as: KLONOPIN     DULoxetine 60 MG extended release capsule  Commonly known as: CYMBALTA     esomeprazole 40 MG delayed release capsule  Commonly known as: NEXIUM     ferrous sulfate 325 (65 Fe) MG EC tablet  Commonly known as: FE TABS 325     hydrOXYzine 25 MG tablet  Commonly known as: ATARAX     ibuprofen 600 MG tablet  Commonly known as: ADVIL;MOTRIN     Movantik 25 MG Tabs tablet  Generic drug: naloxegol     nadolol 40 MG tablet  Commonly known as: CORGARD     oxybutynin 5 MG tablet  Commonly known as: DITROPAN  Take 1 tablet by mouth 2 times daily     oxyCODONE-acetaminophen 7.5-325 MG per tablet  Commonly known as: PERCOCET     potassium chloride 20 MEQ/15ML (10%) oral solution     ProAir  (90 Base) MCG/ACT inhaler  Generic drug: albuterol sulfate HFA     QUEtiapine 50 MG extended release tablet  Commonly known as: SEROQUEL XR     rifaximin 550 MG tablet  Commonly known as: XIFAXAN     tamsulosin 0.4 MG capsule  Commonly known as: Flomax  Take 1 capsule by mouth daily        STOP taking these medications    bethanechol 10 MG tablet  Commonly known as: URECHOLINE     bumetanide 2 MG tablet  Commonly known as: BUMEX     diphenoxylate-atropine 2.5-0.025 MG per tablet  Commonly known as: LOMOTIL     fluocinonide 0.05 % cream  Commonly known as: LIDEX     fluocinonide 0.05 % external solution  Commonly known as: LIDEX     furosemide 40 MG tablet  Commonly known as: LASIX     glipiZIDE 10 MG tablet  Commonly known as: GLUCOTROL     Januvia 100 MG tablet  Generic drug: SITagliptin     losartan 50 MG tablet  Commonly known as: COZAAR     metFORMIN 500 MG tablet  Commonly known as: GLUCOPHAGE     pregabalin 150 MG capsule  Commonly known as: LYRICA     spironolactone 25 MG tablet  Commonly known as: ALDACTONE     venlafaxine 150 MG extended release capsule  Commonly known as: EFFEXOR XR           Where to Get Your Medications      These medications were sent to [facility], [address] [place] [address]    Phone: [phone]   midodrine 10 MG tablet  torsemide 10 MG tablet         No discharge procedures on file. Time Spent on discharge is  35 mins in patient examination, evaluation, counseling as well as medication reconciliation, prescriptions for required medications, discharge plan and follow up. Electronically signed by   Valentine Marcelino MD  1/21/2022  4:34 PM      Thank you Dr. Alan Kaufman MD for the opportunity to be involved in this patient's care.

## 2022-01-21 NOTE — PROGRESS NOTES
Physical Therapy  DATE: 2022    NAME: uDong Stein  MRN: [id number]   : 1965    Patient not seen this date for Physical Therapy due to:      [] Cancel by RN or physician due to:    [] Hemodialysis    [] Critical Lab Value Level     [] Blood transfusion in progress    [] Acute or unstable cardiovascular status   _MAP < 55 or more than >115  _HR < 40 or > 130    [] Acute or unstable pulmonary status   -FiO2 > 60%   _RR < 5 or >40    _O2 sats < 85%    [] Strict Bedrest    [] Off Unit for surgery or procedure    [] Off Unit for testing       [] Pending imaging to R/O fracture    [] Refusal by Patient      [x] Other RN Rashel Holden asked that is patient was sleeping to try and check back later as patient was very anxious overnight and needed to rest.  Will check back if time allows. [] PT being discontinued at this time. Patient independent. No further needs. [] PT being discontinued at this time as the patient has been transferred to hospice care. No further needs.       Francesca Harrell, PTA

## 2022-01-21 NOTE — PROGRESS NOTES
Occupational Therapy  DATE: 2022    NAME: Chilo Morales  MRN: [id number]   : 1965    Patient not seen this date for Occupational Therapy due to:      [x] Cancel by RN or physician due to: RN requested not to wake pt if sleeping d/t increased anxiety. Pt. Sound asleep. OT will check back later if time permits. [] Hemodialysis    [] Critical Lab Value Level     [] Blood transfusion in progress    [] Acute or unstable cardiovascular status   _MAP < 55 or more than >115  _HR < 40 or > 130    [] Acute or unstable pulmonary status   -FiO2 > 60%   _RR < 5 or >40    _O2 sats < 85%    [] Strict Bedrest    [] Off Unit for surgery or procedure    [] Off Unit for testing       [] Pending imaging to R/O fracture    [] Refusal by Patient      [] Other      []OT being discontinued at this time. Patient independent. No further needs. []OT being discontinued at this time as the patient has been transferred to hospice care. No further needs.       Kwabena Tejada MICHELLE

## 2022-01-21 NOTE — PROGRESS NOTES
Pulmonary Critical Care Progress Note  Avinash Cadet MD     Patient seen for the follow up of  Sepsis with acute hypercapnic respiratory failure and septic shock ([facility]     Subjective:  He is sitting up in the bed, alert, oriented and cooperative with exam.  He wore BiPAP last night. He denies significant shortness of breath or chest pain. He has occasional cough, clear sputum. No hemoptysis. He is tolerating orals. He is anxious to be discharged home later today after his last dose of antibiotics.      Examination:  Vitals: BP (!) 136/52   Pulse 62   Temp 97.1 °F (36.2 °C) (Temporal)   Resp 17   Ht 6' (1.829 m)   Wt (!) 350 lb (158.8 kg)   SpO2 97%   BMI 47.47 kg/m²   General appearance: alert and cooperative with exam, sitting up in the bed, no distress  Neck: No JVD  Lungs: Diminished, no crackles or wheezing  Heart: regular rate and rhythm, S1, S2 normal, no gallop  Abdomen: Soft, non tender, + BS  Extremities: no cyanosis or clubbing. +edema    LABs:  CBC:   Recent Labs     01/20/22  0326 01/21/22  0444   WBC 6.6 7.0   HGB 9.0* 9.5*   HCT 28.4* 30.1*   * 88*     BMP:   Recent Labs     01/20/22  0326 01/21/22  0444   * 135   K 4.5 3.9   CO2 22 23   BUN 27* 25*   CREATININE 1.17 1.02   LABGLOM >60 >60   GLUCOSE 266* 223*     ABG:  Lab Results   Component Value Date    PHART 7.330 06/18/2014    MNH2YFJ 68.0 06/18/2014    PO2ART 84.0 06/18/2014    JTM9EEO 34.9 06/18/2014    SOF4OTE NOT REPORTED 01/17/2022    I0UKBHZB 96.5 06/18/2014    FIO2 28.0 01/17/2022       Lab Results   Component Value Date    POCPH 7.353 01/17/2022    PHART 7.330 06/18/2014    POCPCO2 51.5 01/17/2022    KFH2FBG 68.0 06/18/2014    POCPO2 82.2 01/17/2022    PO2ART 84.0 06/18/2014    POCHCO3 28.6 01/17/2022    PVZ4SFP 34.9 06/18/2014    NBEA NOT REPORTED 01/17/2022    PBEA 2 01/17/2022    TCY1VLX NOT REPORTED 01/17/2022    ORXQ5CJX 95 01/17/2022    T5WHKJEL 96.5 06/18/2014    FIO2 28.0 01/17/2022     Radiology:  Jabari Longoria chest 1/17/2022      Impression:  · Acute on chronic hypoxic/hypercarbic respiratory failure  · Sepsis/septic shock  · Obstructive sleep apnea/obesity hypoventilation  · Atelectasis  · Encephalopathy/CO2 narcosis  · Chronic urinary retention with indwelling Clemons  · Acute kidney injury/hepatorenal syndrome  · Liver cirrhosis/history of alcoholism    Recommendations:  · Oxygen via nasal cannula if necessary to keep SPO2 90% or greater  · BiPAP support as needed and while asleep  · Precedex as needed for agitation, wean as able  · Klonopin 0.5 mg twice daily  · DuoNeb via nebulizer  · Prednisone taper  · Meropenem per infectious disease through 1/21/2022  · Monitor blood pressure off Levophed  · Demadex 10 mg daily ordered by nephrology  · Urology following  · DVT prophylaxis, subcu heparin  · GI prophylaxis, Protonix  · PT/OT  · Discharge planning. No objection from pulmonary standpoint outpatient follow-up in 1 to 2 weeks.   · We will follow with you    Electronically signed by     Deb Lundberg MD on 1/21/2022 at 3:11 PM  Pulmonary Critical Care and Sleep Medicine,  [facility]  Cell: [phone]  Office: [phone]

## 2022-01-21 NOTE — PROGRESS NOTES
[facility]  Progress Note    Eric Perez  MRN: [id number]  Date: 1/21/2022  LOS: 6     Reason for F/U :   Urinary tract infection    Impression :   1. Encephalopathy likely toxic metabolic  2. Acute on chronic respiratory failure-hypercapnic   · on chronic home O2 therapy  · He has been on BiPAP  3. Morbid obesity with obstructive sleep apnea  4. Sepsis with concern for septic shock  5. Alcoholic cirrhosis  6. Diabetes mellitus type 2 with associated diabetic neuropathy  7. Essential hypertension  8. Venous stasis dermatitis of lower extremities-chronic  9. Urinary retention with a chronic indwelling Clemons catheter and concern for catheter associated UTI  · Urine culture with Pseudomonas aeruginosa and Enterococcus species unclear if this is pathogens or colonizers  10. Diastolic congestive heart failure    Recommendations:   · The patient continues on antimicrobial therapy with meropenem started 1/15/2022 through 1/21/2022  · Clinically he is markedly improved no longer requiring the BiPAP. · The patient does still have the tele sitter in the room. · Infectious disease wise the plan will be to stop antimicrobial therapy today  · He is okay for discharge from our standpoint    Infection Control Recommendations:   Universal precaution    Discharge Planning:   Estimated Length of IV antimicrobials: 7 days  Patient will need Midline Catheter Insertion/ PICC line Insertion: No  [facility],  LTAC: Undetermined  Patient willneed outpatient wound care: No    Medical Decision making / Summary of Stay:   Eric Perez is a 61y.o.-year-old male who was initially admitted on 1/15/2022. [facility] has a history of morbid obesity with a BMI of 57, diabetes mellitus type 2, essential hypertension, congestive heart failure, urinary retention and has an indwelling Clemons catheter, chronic pancreatitis, among other medical problems.   The patient has been hospitalized multiple times and he presented to the hospital due to altered mental status. The patient apparently was answering questions appropriately when EMS arrived but subsequently fell asleep. He is on home oxygen and in the emergency department the patient was confused. Work-up did include a CT scan of the abdomen pelvis that showed right-sided nonobstructive for lithiasis, cirrhotic liver morphology with sequelae of portal hypertension. A CT of the head showed no acute intracranial abnormality. VQ scan showed low probability for pulmonary embolism  Chest x-ray showed hypoinflated lungs with cardiomegaly again seen no acute consolidation or infiltrate and probable right lung base atelectasis. The patient was admitted for acute encephalopathy, acute kidney injury, and concern for urinary tract infection. The patient is on BiPAP at 30% FiO2. He has been quite agitated is actually restrained at the wrist bilaterally and is on Precedex drip. Current evaluation:2022    /83   Pulse 71   Temp 97.1 °F (36.2 °C) (Temporal)   Resp 17   Ht 6' (1.829 m)   Wt (!) 350 lb (158.8 kg)   SpO2 95%   BMI 47.47 kg/m²     Temperature Range: Temp: 97.1 °F (36.2 °C) Temp  Av.8 °F (36.6 °C)  Min: 96.7 °F (35.9 °C)  Max: 98.7 °F (37.1 °C)  The patient is seen and evaluated at bedside and is awake and alert in no acute distress. He does continue to report some chronic back pain. He is not currently on BiPAP. He is awake and alert in no acute distress. Review of Systems   Constitutional: Negative. Respiratory: Negative. Cardiovascular: Negative. Gastrointestinal: Negative. Genitourinary: Negative. Musculoskeletal: Positive for back pain. Allergic/Immunologic: Negative. Neurological: Negative. Physical Examination :     Physical Exam  Constitutional:       Appearance: He is well-developed. He is obese. Interventions: He is restrained. Face mask in place.    HENT:      Head: Not Detected (1/15/2022) Not Detected Not in Time Range      Lab Results   Component Value Date    COVID19 Not Detected 01/15/2022    COVID19 Not Detected 11/04/2021    COVID19 Not Detected 10/21/2021       No results for input(s): VANCOTROUGH in the last 72 hours. Imaging Studies:   ONE XRAY VIEW OF THE CHEST 1/17/2022 8:30 am  Impression   No radiologic evidence of acute cardiopulmonary disease.           RETROPERITONEAL ULTRASOUND OF THE KIDNEYS AND URINARY BLADDER 1/17/2022  Impression   Markedly limited assessment due to poor sonographic window.       No gross hydronephrosis.       Bladder not visualized.           Cultures:     Culture, Blood 1 [[id number]] Collected: 01/15/22 1700   Order Status: Completed Specimen: Blood Updated: 01/18/22 0112    Specimen Description . BLOOD    Special Requests L HAND 10ML    Culture NO GROWTH 3 DAYS   Culture, Blood 2 [[id number]] Collected: 01/15/22 1652   Order Status: Completed Specimen: Blood Updated: 01/18/22 0112    Specimen Description . BLOOD    Special Requests RAC 12ML    Culture NO GROWTH 3 DAYS       Culture, Urine [[id number]] (Abnormal)  Collected: 01/15/22 1455   Order Status: Completed Specimen: Urine, clean catch Updated: 01/17/22 1253    Specimen Description . CLEAN CATCH URINE    Special Requests NOT REPORTED    Culture PSEUDOMONAS AERUGINOSA >[id number] CFU/ML Abnormal      PRESUMPTIVE ID: GROUP D ENTEROCOCCUS >[id number] CFU/ML   Susceptibility     Pseudomonas aeruginosa     BACTERIAL SUSCEPTIBILITY PANEL SHAHNAZ (Preliminary)     amikacin NOT REPORTED       ceFAZolin NOT REPORTED       cefepime 2  Sensitive     ciprofloxacin 2  Intermediate     gentamicin 2  Sensitive     meropenem NOT REPORTED       piperacillin-tazobactam <=4  Sensitive     tigecycline NOT REPORTED       tobramycin <=1  Sensitive                COVID-19, Rapid [[id number]] Collected: 01/15/22 1335   Order Status: Completed Specimen: Nasopharyngeal Swab Updated: 01/15/22 1405    Specimen Description . NASOPHARYNGEAL SWAB    SARS-CoV-2, Rapid Not Detected    Comment:        Rapid NAAT:  The specimen is NEGATIVE for SARS-CoV-2, the novel coronavirus associated with   COVID-19.         The ID NOW COVID-19 assay is designed to detect the virus that causes COVID-19 in patients   with signs and symptoms of infection who are suspected of COVID-19. An individual without symptoms of COVID-19 and who is not shedding SARS-CoV-2 virus would   expect to have a negative (not detected) result in this assay. Negative results should be treated as presumptive and, if inconsistent with clinical signs   and symptoms or necessary for patient management,   should be tested with an alternative molecular assay.  Negative results do not preclude   SARS-CoV-2 infection and   should not be used as the sole basis for patient management decisions.         Fact sheet for Healthcare Providers: Tristan   Fact sheet for Patients: Tristan           Methodology: Isothermal Nucleic Acid Amplification             Medications:      torsemide  10 mg Oral Daily    predniSONE  40 mg Oral Daily    clonazePAM  0.5 mg Oral BID    midodrine  10 mg Oral TID    sodium chloride  1,000 mL IntraVENous Once    aspirin  81 mg Oral Daily    DULoxetine  60 mg Oral QAM    pantoprazole  40 mg Oral QAM AC    levothyroxine  175 mcg Oral Daily    oxybutynin  5 mg Oral BID    tamsulosin  0.4 mg Oral Daily    sodium chloride flush  5-40 mL IntraVENous 2 times per day    heparin (porcine)  5,000 Units SubCUTAneous 3 times per day    insulin lispro  0-6 Units SubCUTAneous TID WC    insulin lispro  0-3 Units SubCUTAneous Nightly    meropenem  500 mg IntraVENous Q12H           [facility]  Shameka Brown MD  [employer] messaging  OFFICE: [phone]      Electronically signed by Shameka Brown MD on 1/21/2022 at 7:20 AM  Thank you for allowing us to participate in the care of this patient. Please call with questions. This note iscreated with the assistance of a speech recognition program.  While intending to generate a document that actually reflects the content of the visit, the document can still have some errors including those of syntax andsound a like substitutions which may escape proof reading. In such instances, actual meaning can be extrapolated by contextual diversion.

## 2022-01-21 NOTE — PROGRESS NOTES
Jesús Doss   Urology Progress Note            Subjective: Follow-up urinary retention, indwelling Clemons, right kidney stone    Patient Vitals for the past 24 hrs:   BP Temp Temp src Pulse Resp SpO2   01/21/22 0427 -- -- -- -- -- 95 %   01/21/22 0400 105/83 97.1 °F (36.2 °C) Temporal 71 17 95 %   01/21/22 0135 -- -- -- -- -- 98 %   01/21/22 0100 -- -- -- -- 18 --   01/21/22 0000 (!) 164/66 98 °F (36.7 °C) Temporal 73 21 97 %   01/20/22 2341 -- -- -- -- -- 98 %   01/20/22 2000 (!) 141/70 98.7 °F (37.1 °C) Temporal 75 22 93 %   01/20/22 1900 (!) 156/79 -- -- 84 23 96 %   01/20/22 1800 139/61 -- -- 84 21 93 %   01/20/22 1700 (!) 163/83 -- -- 75 24 99 %   01/20/22 1600 (!) 163/83 98.7 °F (37.1 °C) Temporal 80 16 93 %   01/20/22 1500 (!) 157/69 -- -- 81 22 93 %   01/20/22 1400 -- -- -- 88 17 95 %   01/20/22 1300 -- -- -- 84 23 97 %   01/20/22 1200 (!) 167/79 96.7 °F (35.9 °C) Temporal 68 16 92 %   01/20/22 1100 -- -- -- 92 20 (!) 68 %   01/20/22 1000 130/83 -- -- 79 (!) 35 98 %   01/20/22 0900 (!) 118/54 -- -- 78 20 97 %   01/20/22 0800 (!) 154/61 97.5 °F (36.4 °C) -- 58 16 100 %   01/20/22 0706 (!) 144/60 -- -- 59 16 96 %   01/20/22 0700 (!) 144/60 -- -- 59 16 97 %       Intake/Output Summary (Last 24 hours) at 1/21/2022 0651  Last data filed at 1/21/2022 0600  Gross per 24 hour   Intake --   Output 3600 ml   Net -3600 ml       Recent Labs     01/19/22 0317 01/20/22 0326 01/21/22  0444   WBC 7.9 6.6 7.0   HGB 8.9* 9.0* 9.5*   HCT 28.9* 28.4* 30.1*   MCV 85.3 83.8 83.8   * 115* 88*     Recent Labs     01/19/22 0317 01/20/22 0326 01/21/22  0444   * 133* 135   K 4.7 4.5 3.9    101 102   CO2 24 22 23   BUN 25* 27* 25*   CREATININE 1.08 1.17 1.02       No results for input(s): COLORU, PHUR, LABCAST, WBCUA, RBCUA, MUCUS, TRICHOMONAS, YEAST, BACTERIA, CLARITYU, SPECGRAV, LEUKOCYTESUR, UROBILINOGEN, BILIRUBINUR, BLOODU in the last 72 hours.     Invalid input(s): NITRATE, GLUCOSEUKETONESUAMORPHOUS    Additional Lab/culture results:    Physical Exam: Patient not in acute distress, renal function stable 1.02 creatinine    Patient's kidney stone unchanged with no hydronephrosis this is being monitored conservatively    Interval Imaging Findings:    Impression:    Patient Active Problem List   Diagnosis    Alcoholic cirrhosis of liver ([facility] [address] sober since 2013    Peripheral edema    Bipolar disorder ([address]    Type 2 diabetes mellitus with diabetic neuropathy, without long-term current use of insulin ([address]    Essential hypertension, currently hypotensive    Dyslipidemia    Weakness    Hyponatremia    FABI (obstructive sleep apnea)    Primary osteoarthritis of right knee    Type 2 diabetes mellitus with diabetic neuropathy ([address]    Acquired hypothyroidism    Urine retention    Anemia    Slow transit constipation    Constipation    Iron deficiency anemia due to chronic blood loss    Gastroesophageal reflux disease without esophagitis    LEONARDA (acute kidney injury) ([address]    Secondary esophageal varices without bleeding ([address]    Portal hypertension ([facility] [address]    Morbid obesity with BMI of 50.0-59.9, adult (HCC)    Venous stasis dermatitis of both lower extremities    Cellulitis of perineum    Chronic indwelling Clemons catheter    Anxiety and depression    Back pain, chronic    BPH (benign prostatic hyperplasia)    Chronic diastolic congestive heart failure (HCC)    Cirrhosis (HCC)    Diabetes mellitus ([address]    GERD (gastroesophageal reflux disease)    Hepatitis    Hiatal hernia    Hypertension    IBS (irritable bowel syndrome)    Morbid obesity (HCC)    Neuropathy    On home oxygen therapy    Sleep apnea    Thyroid disease    Urinary bladder neurogenic dysfunction    Urinary tract infection associated with indwelling urethral catheter (HCC)    Musculoskeletal immobility    Pyocystis    Myoclonic jerking    Thrombocytopenia (HCC)    Hypotension    Sepsis with acute hypercapnic respiratory failure and septic shock (HCC)    Acute kidney injury superimposed on chronic kidney disease ([facility] [address]    Somnolence    Acute respiratory acidosis       Plan: Maintain indwelling Clemons, discharge when cleared by medicine, follow-up with the office in 2 weeks    Amrit Carlson MD  6:51 AM 1/21/2022

## 2022-01-21 NOTE — PROGRESS NOTES
[facility]  Office: [address], DO, Princess Villanueva, DO, Farooq Singleton, DO, Mor Balbarbi Anderson, DO, Bernadene Ahumada, MD, Abundio Walker MD, Noam Grove MD, Macey Jin MD, Catherine Stone MD, Katey Harp MD, Jacey Baca MD, Mandy Odell, DO, Kwame Campos, DO, Krista Newman MD,  Clarisse Fuller, DO, Sadiq Mishra MD, Gelacio Mclean MD, Elizabeth Child MD, Sena Dudley MD, Dioni Guzman MD, Nilsa Ortega MD, Benson Fair MD, Harlan Otero, [facility], [facility], CNP, Tamera Willis, CNP, Murphy Mcdaniel, [facility], Chris Smith, CNP, Jodi Moreno, CNP, Twylla Boeck, CNP, Ruth Bonilla, CNP, J Carlos Del Angel, CNP, Rosio Curtis PA-C, Kenton Barajas, DNP, Jovanny Murdock, DNP, Filiberto Brower, CNP, Garima Harp, CNP, Floyd Vaughan, CNP, Cathy Bello, CNP, Frantz Cuevas, [facility], Emmanuel Gallagher [facility]    Progress Note    1/21/2022    2:23 PM    Name:   Carrolyn Siemens  MRN:     [id number]     Acct:      [de-identified]   Room:   2031/2031-01  IP Day:  6  Admit Date:  1/15/2022  1:31 PM    PCP:   Belinda Alegre MD  Code Status:  Full Code    Subjective:     C/C:   Chief Complaint   Patient presents with   Ritu Outhouse Altered Mental Status     Interval History Status:   Patient is not confused today, oriented to place and person  States he makes his own decisions and does not [facility] to [facility] as mentioned by his son Hui Pretty he has home care nurse taking care of him at home  earlier  He is on BiPAP off and on  Has chronic indwelling Clemons's catheter, urine culture growing Pseudomonas and Enterococcus, currently getting IV antibiotics-meropenem, stop date today 1/21/2022  CT scan showed nonobstructive right kidney stone-urology evaluating  Serum creatinine improved to 1.02    Is off Precedex drip for 2 days and is getting oral Klonopin twice daily  Started on [facility] and they have signed off  Brief History:   Per my MARK: \"Ruben Dimas is a 61 y.o. Non- / non  male who presents with Altered Mental Status   and is admitted to the hospital for the management of Sepsis ([facility] [address]    Per the ER note Patient is a 50-year-old male with a history of CHF and COPD who presented to the emergency department by EMS from home secondary to altered mental status.  History as per EMS who stated sinus pain and altered throughout the day no history of trauma or fall however when EMS arrived patient was alert answering questions appropriately and then fell asleep.  Patient stated he is on oxygen but is not used at all times.  States that he is vaccinated; no known COVID exposure.  Denied any trauma or injury.  Patient denied difficulty speaking or weakness.  Patient denied chest pain, shortness of breath, nausea, vomiting, fevers or chills     Patient was seen in our ER on 1/12/2022 related to dysuria.  According to the notes his Clemons catheter was changed out. Mikal Kohli document no urinary retention after replacement of Clemons.  At that visit his BUN and creatinine were 27/3. 71.  The ER note from the 12th also notes a blood pressure of 85/50 with a pulse of 66.  Patient told the ER staff that he has been running low.  Patient was discharged.  UOZYN the patient's bun/creatinine is 46/ 6. 24.  On review of his chart he was hospitalized in November 2021 with a creatinine of 4.62 that trended down to 1.8 after receiving fluids. [place] 2021 the patient was admitted with pneumonia.  According to the notes the patient developed LEONARDA from overdiuresis, ischemic ATN secondary to hypotension.  At that time his creatinine bumped to 2.72 with fluids recovered to 1.48      Review of Systems:     Constitutional:  negative for chills, fevers, sweats  Respiratory:  negative for cough, dyspnea on exertion, shortness of breath, wheezing  Cardiovascular:  negative for chest pain, chest pressure/discomfort, lower extremity edema, palpitations  Gastrointestinal: negative for abdominal pain, constipation, diarrhea, nausea, vomiting  Neurological:  negative for dizziness, headache  Complains of generalized body pains which are chronic  Medications: Allergies:     Allergies   Allergen Reactions    No Known Allergies        Current Meds:   Scheduled Meds:    torsemide  10 mg Oral Daily    predniSONE  40 mg Oral Daily    clonazePAM  0.5 mg Oral BID    midodrine  10 mg Oral TID    sodium chloride  1,000 mL IntraVENous Once    aspirin  81 mg Oral Daily    DULoxetine  60 mg Oral QAM    pantoprazole  40 mg Oral QAM AC    levothyroxine  175 mcg Oral Daily    oxybutynin  5 mg Oral BID    tamsulosin  0.4 mg Oral Daily    sodium chloride flush  5-40 mL IntraVENous 2 times per day    heparin (porcine)  5,000 Units SubCUTAneous 3 times per day    insulin lispro  0-6 Units SubCUTAneous TID WC    insulin lispro  0-3 Units SubCUTAneous Nightly    meropenem  500 mg IntraVENous Q12H     Continuous Infusions:    DOPamine Stopped (01/18/22 0850)    dexmedetomidine (PRECEDEX) IV infusion Stopped (01/19/22 1104)    sodium chloride      sodium chloride      dextrose      norepinephrine Stopped (01/17/22 1315)     PRN Meds: ipratropium-albuterol, fentanNYL, LORazepam, sodium chloride, albuterol sulfate HFA, sodium chloride flush, sodium chloride, ondansetron **OR** ondansetron, acetaminophen **OR** acetaminophen, glucose, dextrose, glucagon (rDNA), dextrose    Data:     Past Medical History:   has a past medical history of Anxiety and depression, Back pain, chronic, BPH (benign prostatic hyperplasia), Cellulitis of left lower extremity, Cellulitis of right lower extremity, CHF (congestive heart failure) ([facility] [address] Cir[facility]osis ([facility] Diabetes mellitus ([facility] Epididymoorchitis, GERD (gastroesophageal reflux disease), H/O cardiac catheterization, Hepatitis, Hiatal hernia, History of alcohol abuse, Hyperlipidemia, Hypertension, IBS (irritable bowel syndrome), Incisional hernia with obstruction but no gangrene, Klebsiella sepsis ([facility] [address] Metabolic encephalopathy, Morbid obesity ([facility] [address] Neuropathy, On home oxygen therapy, On home oxygen therapy, Pancreatitis, Poisoning by insulin and oral hypoglycemic (antidiabetic) drugs, accidental (unintentional), initial encounter, Primary osteoarthritis of right knee, Retention, urine, SBO (small bowel obstruction) ([facility] [address] Septic shock ([facility] [address] Sleep apnea, Sleep apnea, obstructive, Thyroid disease, Urinary bladder neurogenic dysfunction, and Urinary tract infection associated with indwelling urethral catheter ([facility] [address] Social History:   reports that he has never smoked. He has never used smokeless tobacco. He reports that he does not drink alcohol and does not use drugs. Family History:   Family History   Adopted: Yes   Problem Relation Age of Onset    No Known Problems Mother         Adopted    No Known Problems Father         Adopted       Vitals:  /60   Pulse 70   Temp 97.4 °F (36.3 °C) (Temporal)   Resp 20   Ht 6' (1.829 m)   Wt (!) 350 lb (158.8 kg)   SpO2 98%   BMI 47.47 kg/m²   Temp (24hrs), Av.8 °F (36.6 °C), Min:97.1 °F (36.2 °C), Max:98.7 °F (37.1 °C)    Recent Labs     22  1732 22  0755 22  1155   POCGLU 228* 245* 146* 173*       I/O (24Hr):     Intake/Output Summary (Last 24 hours) at 2022 1423  Last data filed at 2022 1232  Gross per 24 hour   Intake --   Output 4450 ml   Net -4450 ml       Labs:  Hematology:  Recent Labs     22  0317 22  0326 22  0444   WBC 7.9 6.6 7.0   RBC 3.39* 3.39* 3.59*   HGB 8.9* 9.0* 9.5*   HCT 28.9* 28.4* 30.1*   MCV 85.3 83.8 83.8   MCH 26.3 26.5 26.5   MCHC 30.8 31.7 31.6   RDW 16.7* 16.2* 15.9*   * 115* 88*   MPV 10.2 10.4 10.2     Chemistry:  Recent Labs     22  0317 22  0326 22  0444   * 133* 135   K 4.7 4.5 3.9    101 102   CO2 24 22 23   GLUCOSE 199* 266* 223*   BUN 25* 27* 25*   CREATININE 1.08 1. 17 1.02   ANIONGAP 8* 10 10   LABGLOM >60 >60 >60   GFRAA >60 >60 >60   CALCIUM 8.2* 8.3* 8.3*     Recent Labs     01/20/22  0800 01/20/22  1151 01/20/22  1732 01/20/22 2008 01/21/22  0755 01/21/22  1155   POCGLU 178* 234* 228* 245* 146* 173*     ABG:  Lab Results   Component Value Date    POCPH 7.353 01/17/2022    PHART 7.330 06/18/2014    POCPCO2 51.5 01/17/2022    TEJ3EUU 68.0 06/18/2014    POCPO2 82.2 01/17/2022    PO2ART 84.0 06/18/2014    POCHCO3 28.6 01/17/2022    RQS9QYF 34.9 06/18/2014    NBEA NOT REPORTED 01/17/2022    PBEA 2 01/17/2022    XAI3LOB NOT REPORTED 01/17/2022    QUHI9JGG 95 01/17/2022    W0VXQCSP 96.5 06/18/2014    FIO2 28.0 01/17/2022     Lab Results   Component Value Date/Time    SPECIAL L HAND 10ML 01/15/2022 05:00 PM     Lab Results   Component Value Date/Time    CULTURE NO GROWTH 5 DAYS 01/15/2022 05:00 PM       Radiology:  CT ABDOMEN PELVIS WO CONTRAST Additional Contrast? None    Result Date: 1/15/2022  1. Right nonobstructive nephrolithiasis. 2. Cirrhotic liver morphology with sequela of portal hypertension. Consider nonemergent liver MRI with Eovist for [address] screening. CT Head WO Contrast    Result Date: 1/15/2022  No acute intracranial abnormality. Senescent changes including mild cortical atrophy. NM LUNG SCAN PERFUSION ONLY    Result Date: 1/15/2022  Low Probability for Pulmonary Embolus. XR CHEST PORTABLE    Result Date: 1/17/2022  No radiologic evidence of acute cardiopulmonary disease. XR CHEST PORTABLE    Result Date: 1/15/2022  Hypoinflated lungs. Cardiomegaly again seen, when compared to the previous study performed 11/02/2021. No acute consolidation or infiltrate. Probable right lung base atelectasis. US RETROPERITONEAL COMPLETE    Result Date: 1/17/2022  Markedly limited assessment due to poor sonographic window. No gross hydronephrosis. Bladder not visualized.        Physical Examination:        General appearance:  alert, obese, no distress, obese  Mental Status: Oriented to time place and person today  Lungs:  clear to auscultation bilaterally, normal effort  Heart:  regular rate and rhythm, no murmur  Abdomen:  soft, nontender, nondistended, normal bowel sounds, no masses, hepatomegaly, splenomegaly  Extremities:  no edema, redness, tenderness in the calves  Skin:  + Venous stasis changes both lower extremities    Assessment:        Hospital Problems           Last Modified POA    * (Principal) Sepsis with acute hypercapnic respiratory failure and septic shock ([address] 3/43/1904 Yes    Alcoholic cirrhosis of liver ([address] sober since 2013 1/15/2022 Yes    Bipolar disorder ([address] 1/15/2022 Yes    Type 2 diabetes mellitus with diabetic neuropathy, without long-term current use of insulin ([address] 1/15/2022 Yes    Essential hypertension, currently hypotensive 1/15/2022 Yes    FABI (obstructive sleep apnea) 1/15/2022 Yes    Type 2 diabetes mellitus with diabetic neuropathy (HCC) (Chronic) 1/15/2022 Yes    Acquired hypothyroidism 1/15/2022 Yes    Venous stasis dermatitis of both lower extremities (Chronic) 1/15/2022 Yes    Chronic indwelling Clemons catheter (Chronic) 1/15/2022 Yes    BPH (benign prostatic hyperplasia) 1/15/2022 Yes    Chronic diastolic congestive heart failure ([address] 1/16/2022 Yes    On home oxygen therapy 1/15/2022 Yes    Overview Signed 11/4/2020  2:08 PM by SUNSHINE Neumann - GILBERT     prn         Urinary bladder neurogenic dysfunction 1/15/2022 Yes    Urinary tract infection associated with indwelling urethral catheter ([address] 1/15/2022 Yes    Acute kidney injury superimposed on chronic kidney disease ([address] 1/15/2022 Yes    Somnolence 1/15/2022 Yes    Acute respiratory acidosis 1/16/2022 Yes          Plan:        1. Last day of IV meropenem in consultation with ID-stop date 1/21/2022  2. BiPAP as needed if he cooperates  3. Continue Demadex 10 mg daily   4. Neurology also evaluated the patient  5.  Urology recommended to keep indwelling catheter, patient will need cystoscopy eventually  6. Continue oral Klonopin twice daily for the  7.  [facility] or home with home care possibly today, waiting for 's input      Odessa Camargo MD  1/21/2022  2:23 PM

## 2022-01-21 NOTE — CARE COORDINATION
Discharge Planning    KAROLYN and medical info faxed to [address] [facility], [address] [facility].   They have all been informed by phone of pt's d/c today [facility]

## 2022-01-21 NOTE — CARE COORDINATION
Discharge Planning    Pt's ex wife has spoken with her per phone and tried to convince him to go to SNF. Pt states he is still going home. Dr Bryan Olivia informed and will complete KAROLYN. Phone call to [facility] [phone] for pt's SN visits to resume and informed them of d/c today. Phone call to [facility] care at [phone]  To resume home health aide services and they are able to. Phone call to [facility] and spoke with Virgie and she confirms that pt has been on hold and informed them of d/c today. Forms faxed to [phone] [facility] for wheelchair transport home at pt's request.  Negin Mohan up time arranged for 1930 . Nurse Karyle Lance and pt informed. Phone call to pt's son Zelda Nogueira and informed him of  time of 1930 and that he will need to be home to assist to transfer from wheelchair.     Valentine Ohara informed pt going home and she will cancel SNF

## 2022-01-22 NOTE — PROGRESS NOTES
Discharge Note:      All discharge instructions given at this time as well as all patient belongings returned to patient. Pt denies any further questions regarding discharge at this time. Pt given also given discharge packet with unit letter, discharge instructions/restrictions and medication handouts regarding all discharge medications and side effects. Pt denies any further issues at this time. Pt transported via stretcher to home per Felipe Cunningham. Pt left premises without any issues.

## 2022-01-24 ENCOUNTER — CARE COORDINATION (OUTPATIENT)
Dept: CASE MANAGEMENT | Age: 57
End: 2022-01-24

## 2022-01-24 NOTE — CARE COORDINATION
[facility] Transitions Initial Follow Up Call    Call within 2 business days of discharge: Yes    Patient: Lata Oropeza Patient : 1965   MRN: <[id number]>  Reason for Admission: Sepsis w/acute hypercapnic respiratory failure and septic shock  Discharge Date: 22 RARS: Readmission Risk Score: 31.5 ( )      Last Discharge [facility]       Complaint Diagnosis Description Type Department Provider    1/15/22 Altered Mental Status Acute encephalopathy . .. ED to Hosp-Admission (Discharged) (ADMITTED) MONY Graff MD; Chevy Roque. .. Facility:  [facility]    Patient currently in a BPCI-A bundle. He was readmitted to [facility] from 1/15/22-22 for sepsis with acute hypercapnic respiratory failure and septic shock. 1st attempt to contact patient for initial transition follow up. Unable to reach patient. Left message with contact information and request for call back. CTN contacted [address] [facility] Spoke with Cricket Ross who confirmed they have resumed care. States nurse was there on Saturday,  and today.         Follow Up  Future Appointments   Date Time Provider Bennett Murdock   2022 10:40 AM Shandra Wylie MD [facility] None       Suzy Garcia RN

## 2022-01-25 ENCOUNTER — CARE COORDINATION (OUTPATIENT)
Dept: CASE MANAGEMENT | Age: 57
End: 2022-01-25

## 2022-01-25 NOTE — CARE COORDINATION
[facility] Transitions Follow Up Call    2022    Patient: Stephan Krishnamurthy  Patient : 1965   MRN: [id number]  Reason for Admission:   Discharge Date: 22 RARS: Readmission Risk Score: 31.5 ( )         Spoke with: Araceli Swann and his VN  Care Transitions Follow Up Call    Needs to be reviewed by the provider   Additional needs identified to be addressed with provider: Yes  medications-doesnt have scripts for some of his discharge medications, VN calling doctor for orders             Method of communication with provider : none      Care Transition Nurse (CTN) contacted the patient by telephone to follow up after admission on . Verified name and  with patient as identifiers. Addressed changes since last contact: none  Discussed follow-up appointments. If no appointment was previously scheduled, appointment scheduling offered: Yes. Is follow up appointment scheduled within 7 days of discharge? No.    Advance Care Planning:   Does patient have an Advance Directive: reviewed and current, reviewed and needs to be updated, not on file; education provided, not on file, patient declined education, decision maker updated and referral to internal ACP facilitator. CTN reviewed discharge instructions, medical action plan and red flags with patient and discussed any barriers to care and/or understanding of plan of care after discharge. Discussed appropriate site of care based on symptoms and resources available to patient including: PCP, Specialist, Urgent care clinics, Home health and When to call 911. The patient agrees to contact the PCP office for questions related to their healthcare. Patients top risk factors for readmission: functional physical ability  Interventions to address risk factors: Obtained and reviewed discharge summary and/or continuity of care documents      Non-[facility] follow up appointment(s): 22    CTN provided contact information for future needs.  Plan for follow-up call in 7-10 days based on severity of symptoms and risk factors. Plan for next call: routine          Care Transitions Subsequent and Final Call    Subsequent and Final Calls  Do you have any ongoing symptoms?: No  Have your medications changed?: Yes  Do you have any questions related to your medications?: Yes  Do you currently have any active services?: Yes  Are you currently active with any services?: Home Health  Care Transitions Interventions  Other Interventions:            Follow Up  Future Appointments   Date Time Provider Bennett Murdock   2/7/2022 10:40 AM Phillip Mcmahan MD [facility] None       Grey Nichole RN

## 2022-06-08 ENCOUNTER — ANESTHESIA (OUTPATIENT)
Dept: OPERATING ROOM | Age: 57
End: 2022-06-08
Payer: MEDICARE

## 2022-06-08 ENCOUNTER — HOSPITAL ENCOUNTER (OUTPATIENT)
Age: 57
Setting detail: OUTPATIENT SURGERY
Discharge: HOME | End: 2022-06-08
Attending: UROLOGY | Admitting: UROLOGY
Payer: MEDICARE

## 2022-06-08 ENCOUNTER — ANESTHESIA EVENT (OUTPATIENT)
Dept: OPERATING ROOM | Age: 57
End: 2022-06-08
Payer: MEDICARE

## 2022-06-08 VITALS
HEIGHT: 72 IN | WEIGHT: 315 LBS | HEART RATE: 62 BPM | RESPIRATION RATE: 14 BRPM | BODY MASS INDEX: 42.66 KG/M2 | OXYGEN SATURATION: 100 % | SYSTOLIC BLOOD PRESSURE: 122 MMHG | DIASTOLIC BLOOD PRESSURE: 52 MMHG | TEMPERATURE: 97.5 F

## 2022-06-08 DIAGNOSIS — R35.0 FREQUENCY OF URINATION: ICD-10-CM

## 2022-06-08 DIAGNOSIS — N39.0 URINARY TRACT INFECTION WITHOUT HEMATURIA, SITE UNSPECIFIED: ICD-10-CM

## 2022-06-08 DIAGNOSIS — R32 URINARY INCONTINENCE, UNSPECIFIED TYPE: ICD-10-CM

## 2022-06-08 LAB
GLUCOSE BLD-MCNC: 67 MG/DL (ref 75–110)
GLUCOSE BLD-MCNC: 68 MG/DL (ref 75–110)
GLUCOSE BLD-MCNC: 70 MG/DL (ref 75–110)
GLUCOSE BLD-MCNC: 77 MG/DL (ref 75–110)
GLUCOSE BLD-MCNC: 78 MG/DL (ref 75–110)

## 2022-06-08 PROCEDURE — 2709999900 HC NON-CHARGEABLE SUPPLY: Performed by: UROLOGY

## 2022-06-08 PROCEDURE — 7100000010 HC PHASE II RECOVERY - FIRST 15 MIN: Performed by: UROLOGY

## 2022-06-08 PROCEDURE — 2580000003 HC RX 258: Performed by: ANESTHESIOLOGY

## 2022-06-08 PROCEDURE — 6370000000 HC RX 637 (ALT 250 FOR IP): Performed by: UROLOGY

## 2022-06-08 PROCEDURE — 82365 CALCULUS SPECTROSCOPY: CPT

## 2022-06-08 PROCEDURE — 6360000002 HC RX W HCPCS: Performed by: NURSE ANESTHETIST, CERTIFIED REGISTERED

## 2022-06-08 PROCEDURE — 82947 ASSAY GLUCOSE BLOOD QUANT: CPT

## 2022-06-08 PROCEDURE — 3600000002 HC SURGERY LEVEL 2 BASE: Performed by: UROLOGY

## 2022-06-08 PROCEDURE — 3700000001 HC ADD 15 MINUTES (ANESTHESIA): Performed by: UROLOGY

## 2022-06-08 PROCEDURE — 3600000012 HC SURGERY LEVEL 2 ADDTL 15MIN: Performed by: UROLOGY

## 2022-06-08 PROCEDURE — 3700000000 HC ANESTHESIA ATTENDED CARE: Performed by: UROLOGY

## 2022-06-08 PROCEDURE — 7100000011 HC PHASE II RECOVERY - ADDTL 15 MIN: Performed by: UROLOGY

## 2022-06-08 RX ORDER — SODIUM CHLORIDE, SODIUM LACTATE, POTASSIUM CHLORIDE, CALCIUM CHLORIDE 600; 310; 30; 20 MG/100ML; MG/100ML; MG/100ML; MG/100ML
INJECTION, SOLUTION INTRAVENOUS CONTINUOUS
Status: DISCONTINUED | OUTPATIENT
Start: 2022-06-09 | End: 2022-06-08 | Stop reason: HOSPADM

## 2022-06-08 RX ORDER — MIDAZOLAM HYDROCHLORIDE 1 MG/ML
INJECTION INTRAMUSCULAR; INTRAVENOUS PRN
Status: DISCONTINUED | OUTPATIENT
Start: 2022-06-08 | End: 2022-06-08 | Stop reason: SDUPTHER

## 2022-06-08 RX ORDER — SODIUM CHLORIDE 0.9 % (FLUSH) 0.9 %
5-40 SYRINGE (ML) INJECTION PRN
Status: DISCONTINUED | OUTPATIENT
Start: 2022-06-08 | End: 2022-06-08 | Stop reason: HOSPADM

## 2022-06-08 RX ORDER — CEPHALEXIN 500 MG/1
500 CAPSULE ORAL 3 TIMES DAILY
Qty: 15 CAPSULE | Refills: 0 | Status: SHIPPED | OUTPATIENT
Start: 2022-06-08 | End: 2022-06-13

## 2022-06-08 RX ORDER — ONDANSETRON 2 MG/ML
4 INJECTION INTRAMUSCULAR; INTRAVENOUS
Status: CANCELLED | OUTPATIENT
Start: 2022-06-08 | End: 2022-06-08

## 2022-06-08 RX ORDER — PROPOFOL 10 MG/ML
INJECTION, EMULSION INTRAVENOUS PRN
Status: DISCONTINUED | OUTPATIENT
Start: 2022-06-08 | End: 2022-06-08 | Stop reason: SDUPTHER

## 2022-06-08 RX ORDER — SODIUM CHLORIDE 9 MG/ML
INJECTION, SOLUTION INTRAVENOUS PRN
Status: DISCONTINUED | OUTPATIENT
Start: 2022-06-08 | End: 2022-06-08 | Stop reason: HOSPADM

## 2022-06-08 RX ORDER — FENTANYL CITRATE 50 UG/ML
INJECTION, SOLUTION INTRAMUSCULAR; INTRAVENOUS PRN
Status: DISCONTINUED | OUTPATIENT
Start: 2022-06-08 | End: 2022-06-08 | Stop reason: SDUPTHER

## 2022-06-08 RX ORDER — FENTANYL CITRATE 50 UG/ML
25 INJECTION, SOLUTION INTRAMUSCULAR; INTRAVENOUS EVERY 5 MIN PRN
Status: CANCELLED | OUTPATIENT
Start: 2022-06-08

## 2022-06-08 RX ORDER — DEXTROSE MONOHYDRATE 50 MG/ML
INJECTION, SOLUTION INTRAVENOUS CONTINUOUS
Status: DISCONTINUED | OUTPATIENT
Start: 2022-06-08 | End: 2022-06-08 | Stop reason: HOSPADM

## 2022-06-08 RX ORDER — SODIUM CHLORIDE 0.9 % (FLUSH) 0.9 %
5-40 SYRINGE (ML) INJECTION EVERY 12 HOURS SCHEDULED
Status: DISCONTINUED | OUTPATIENT
Start: 2022-06-08 | End: 2022-06-08 | Stop reason: HOSPADM

## 2022-06-08 RX ORDER — LIDOCAINE HYDROCHLORIDE 20 MG/ML
JELLY TOPICAL PRN
Status: DISCONTINUED | OUTPATIENT
Start: 2022-06-08 | End: 2022-06-08 | Stop reason: ALTCHOICE

## 2022-06-08 RX ORDER — OXYCODONE HYDROCHLORIDE 5 MG/1
5 TABLET ORAL
Status: CANCELLED | OUTPATIENT
Start: 2022-06-08 | End: 2022-06-08

## 2022-06-08 RX ORDER — LIDOCAINE HYDROCHLORIDE 10 MG/ML
1 INJECTION, SOLUTION EPIDURAL; INFILTRATION; INTRACAUDAL; PERINEURAL
Status: DISCONTINUED | OUTPATIENT
Start: 2022-06-09 | End: 2022-06-08 | Stop reason: HOSPADM

## 2022-06-08 RX ADMIN — PROPOFOL 10 MG: 10 INJECTION, EMULSION INTRAVENOUS at 13:57

## 2022-06-08 RX ADMIN — PROPOFOL 20 MG: 10 INJECTION, EMULSION INTRAVENOUS at 14:08

## 2022-06-08 RX ADMIN — DEXTROSE MONOHYDRATE: 50 INJECTION, SOLUTION INTRAVENOUS at 11:25

## 2022-06-08 RX ADMIN — PROPOFOL 30 MG: 10 INJECTION, EMULSION INTRAVENOUS at 13:54

## 2022-06-08 RX ADMIN — Medication 75 MCG: at 13:53

## 2022-06-08 RX ADMIN — PROPOFOL 10 MG: 10 INJECTION, EMULSION INTRAVENOUS at 14:05

## 2022-06-08 RX ADMIN — PROPOFOL 30 MG: 10 INJECTION, EMULSION INTRAVENOUS at 13:53

## 2022-06-08 RX ADMIN — PROPOFOL 10 MG: 10 INJECTION, EMULSION INTRAVENOUS at 14:03

## 2022-06-08 RX ADMIN — PROPOFOL 20 MG: 10 INJECTION, EMULSION INTRAVENOUS at 14:10

## 2022-06-08 RX ADMIN — PROPOFOL 10 MG: 10 INJECTION, EMULSION INTRAVENOUS at 14:00

## 2022-06-08 RX ADMIN — MIDAZOLAM 2 MG: 1 INJECTION INTRAMUSCULAR; INTRAVENOUS at 13:30

## 2022-06-08 RX ADMIN — Medication 25 MCG: at 13:40

## 2022-06-08 ASSESSMENT — PAIN - FUNCTIONAL ASSESSMENT: PAIN_FUNCTIONAL_ASSESSMENT: 0-10

## 2022-06-08 ASSESSMENT — ENCOUNTER SYMPTOMS: SHORTNESS OF BREATH: 1

## 2022-06-08 ASSESSMENT — PAIN DESCRIPTION - DESCRIPTORS: DESCRIPTORS: DISCOMFORT;ACHING;TENDER;THROBBING

## 2022-06-08 NOTE — ANESTHESIA POSTPROCEDURE EVALUATION
Department of Anesthesiology  Postprocedure Note    Patient: Kathi Huston  MRN: [id number]  [place]: 1965  Date of evaluation: 6/8/2022  Time:  2:52 PM     Procedure Summary     Date: 06/08/22 Room / Location: [facility] - INPATIENT    Anesthesia Start: 1330 Anesthesia Stop: 1430    Procedure: CYSTOSCOPY, REMOVAL OF SMALL BLADDER STONE AND BLADDER IRRIGATION (N/A ) Diagnosis:       Urinary tract infection without hematuria, site unspecified      Frequency of urination      Urinary incontinence, unspecified type      (Urinary tract infection without hematuria, site unspecified [N39.0])      (Frequency of urination [R35.0])      (Urinary incontinence, unspecified type [R32])    Surgeons: Chidi Albert MD Responsible Provider: Gita Castillo MD    Anesthesia Type: MAC ASA Status: 4          Anesthesia Type: No value filed. Des Phase I:      Des Phase II: Des Score: 9    Last vitals: Reviewed and per EMR flowsheets.        Anesthesia Post Evaluation    Complications: no

## 2022-06-08 NOTE — ANESTHESIA PRE PROCEDURE
Department of Anesthesiology  Preprocedure Note       Name:  Fuentes Pandya   Age:  61 y.o.  :  1965                                          MRN:  [id number]         Date:  2022      Surgeon: Naveed Oliveira):  Madie Galvez MD    Procedure: Procedure(s):  CYSTOSCOPY  WITH BLADDER IRRIGATION    Medications prior to admission:   Prior to Admission medications    Medication Sig Start Date End Date Taking? Authorizing Provider   torsemide (DEMADEX) 10 MG tablet Take 1 tablet by mouth daily 22   Dickson Query, MD   midodrine (PROAMATINE) 10 MG tablet Take 1 tablet by mouth 3 times daily Hold for systolic blood pressure greater than 110 22   daline Query, MD   ferrous sulfate (FE TABS 325) 325 (65 Fe) MG EC tablet Take 325 mg by mouth daily (with breakfast)    Historical Provider, MD   hydrOXYzine (ATARAX) 25 MG tablet Take 25 mg by mouth every 8 hours as needed for Itching    Historical Provider, MD   clonazePAM (KLONOPIN) 1 MG tablet Take 1 mg by mouth 2 times daily. Historical Provider, MD   naloxegol (MOVANTIK) 25 MG TABS tablet Take 25 mg by mouth every morning    Historical Provider, MD   nadolol (CORGARD) 40 MG tablet Take 40 mg by mouth daily    Historical Provider, MD   oxyCODONE-acetaminophen (PERCOCET) 7.5-325 MG per tablet Take 1 tablet by mouth every 6 hours as needed.      Historical Provider, MD   potassium chloride 20 MEQ/15ML (10%) oral solution Take 20 mEq by mouth daily    Historical Provider, MD   rifaximin (XIFAXAN) 550 MG tablet Take 550 mg by mouth 2 times daily    Historical Provider, MD   ibuprofen (ADVIL;MOTRIN) 600 MG tablet Take 600 mg by mouth every 6 hours as needed for Pain    Historical Provider, MD   insulin glargine (BASAGLAR KWIKPEN) 100 UNIT/ML injection pen Inject 80 Units into the skin 2 times daily    Historical Provider, MD   nystatin (MYCOSTATIN) 393773 UNIT/GM cream Apply topically 2 times daily as needed (to skin folds)    Historical Provider, MD QUEtiapine (SEROQUEL XR) 50 MG extended release tablet Take 50 mg by mouth nightly    Historical Provider, MD   albuterol sulfate HFA (PROAIR HFA) 108 (90 Base) MCG/ACT inhaler Inhale 2 puffs into the lungs every 6 hours as needed for Wheezing    Historical Provider, MD   tamsulosin (FLOMAX) 0.4 MG capsule Take 1 capsule by mouth daily 1/24/18   Jayy Hazel MD   oxybutynin (DITROPAN) 5 MG tablet Take 1 tablet by mouth 2 times daily 12/12/17   Glen Coelho DO   aspirin 81 MG EC tablet Take 1 tablet by mouth daily 7/26/17   Rylee Bojorquez,    levothyroxine (SYNTHROID) 175 MCG tablet Take 175 mcg by mouth Daily     Historical Provider, MD   esomeprazole (NEXIUM) 40 MG capsule Take 40 mg by mouth 2 times daily     Historical Provider, MD   DULoxetine (CYMBALTA) 60 MG capsule Take 60 mg by mouth every morning     Historical Provider, MD       Current medications:    No current facility-administered medications for this encounter. Allergies:     Allergies   Allergen Reactions    No Known Allergies        Problem List:    Patient Active Problem List   Diagnosis Code    Alcoholic cirrhosis of liver ([facility] sober since 2013 K70.30    Peripheral edema R60.9    Bipolar disorder ([facility] F31.9    Type 2 diabetes mellitus with diabetic neuropathy, without long-term current use of insulin ([facility] E11.40    Essential hypertension, currently hypotensive I10    Dyslipidemia E78.5    Weakness R53.1    Hyponatremia E87.1    FABI (obstructive sleep apnea) G47.33    Primary osteoarthritis of right knee M17.11    Type 2 diabetes mellitus with diabetic neuropathy (HCC) E11.40    Acquired hypothyroidism E03.9    Urine retention R33.9    Anemia D64.9    Slow transit constipation K59.01    Constipation K59.00    Iron deficiency anemia due to chronic blood loss D50.0    Gastroesophageal reflux disease without esophagitis K21.9    LEONARDA (acute kidney injury) (HCC) N17.9    Secondary esophageal varices without bleeding (HCC) I85.10    Portal hypertension (HCC) K76.6    Morbid obesity with BMI of 50.0-59.9, adult (HCC) E66.01, Z68.43    Venous stasis dermatitis of both lower extremities I87.2    Cellulitis of perineum L03.315    Chronic indwelling Clemons catheter Z97.8    Anxiety and depression F41.9, F32. A    Back pain, chronic M54.9, G89.29    BPH (benign prostatic hyperplasia) N40.0    Chronic diastolic congestive heart failure (HCC) I50.32    Cirrhosis (HCC) K74.60    Diabetes mellitus (HCC) E11.9    GERD (gastroesophageal reflux disease) K21.9    Hepatitis K75.9    Hiatal hernia K44.9    Hypertension I10    IBS (irritable bowel syndrome) K58.9    Morbid obesity (HCC) E66.01    Neuropathy G62.9    On home oxygen therapy Z99.81    Sleep apnea G47.30    Thyroid disease E07.9    Urinary bladder neurogenic dysfunction N31.9    Urinary tract infection associated with indwelling urethral catheter (HCC) T83.511A, N39.0    Musculoskeletal immobility Z74.09    Pyocystis N30.80    Myoclonic jerking G25.3    Thrombocytopenia (HCC) D69.6    Hypotension I95.9    Sepsis with acute hypercapnic respiratory failure and septic shock (HCC) A41.9, R65.21, J96.02    Acute kidney injury superimposed on chronic kidney disease (HCC) N17.9, N18.9    Somnolence R40.0    Acute respiratory acidosis E87.2       Past Medical History:        Diagnosis Date    Anxiety and depression     Back pain, chronic     BPH (benign prostatic hyperplasia)     Cellulitis of left lower extremity 8/21/2017    Cellulitis of right lower extremity 8/19/2017    CHF (congestive heart failure) (HCC)     Cirrhosis ([facility]     Diabetes mellitus ([facility]     not checking bloodsugar at home    Epididymoorchitis     GERD (gastroesophageal reflux disease)     H/O cardiac catheterization not sure of date    no stents    Hepatitis     Pt does not know the type.      Hiatal hernia     History of alcohol abuse     Sober since 2013    Hyperlipidemia     not taking any medication    Hypertension     IBS (irritable bowel syndrome)     Incisional hernia with obstruction but no gangrene 5/20/2018    Klebsiella sepsis ([address] 72/62/4284    Metabolic encephalopathy     Morbid obesity (HCC)     Neuropathy     feet,toes    On home oxygen therapy     DME for home oxgygen at 2.5 lpm at HS and as needed    On home oxygen therapy     pt uses 2-3L NC @ home    Pancreatitis     x 5 episodes    Poisoning by insulin and oral hypoglycemic (antidiabetic) drugs, accidental (unintentional), initial encounter 1/12/2021    Primary osteoarthritis of right knee     Retention, urine 1/24/2018    SBO (small bowel obstruction) ([address] 5/19/2018    Septic shock ([address]     Sleep apnea     suspected juany, never has had PSG study. HAS NO MACHINE    Sleep apnea, obstructive     pt noncompliant w/ CPAP @ home.      Thyroid disease     Urinary bladder neurogenic dysfunction     Urinary tract infection associated with indwelling urethral catheter ([address] 11/4/2020       Past Surgical History:        Procedure Laterality Date    ABDOMINAL SURGERY      hernia repair x4 (ventral)    COLONOSCOPY      2012    CYSTOSCOPY  01/24/2018    CYSTOSCOPY  02/20/2019    CYSTOSCOPY N/A 2/20/2019    CYSTOSCOPY DILATION performed by Allean Cogan, MD at [address] [place] [address] 8/23/2019    CYSTOSCOPY/ DILATION NICOEL CATHETER EXCHANGE performed by Allean Cogan, MD at [address] [facility], DIAGNOSTIC     [address]  05/20/2018    repair and reduction of recurrent incarcerated incisional hernia with mesh    [facility] CYSTOU[facility]ROS[facility] N/A 1/24/2018    CYSTOSCOPY Sara Bethea performed by Allean Cogan, MD at [address] Bilateral 8/22/2017    FOOT [address] [facility] with bone bx performed by Caty Olivas DPM at [address] EGD TRANSORAL BIOPSY SINGLE/MULTIPLE N/A 7/19/2017    EGD BIOPSY performed by Rhea Zepeda MD at [address] [facility]  07/19/2017    polypectomy    UPPER GASTROINTESTINAL ENDOSCOPY N/A 3/14/2019    EGD BIOPSY performed by Ryder Keller MD at [address] [facility] N/A 5/20/2018    REPAIR AND REDUCTION OF RECURRENT INCARCERATED INCISIONAL HERNIA WITH MESH performed by Roxane Mcdonough MD at [address] History:    Social History     Tobacco Use    Smoking status: Never Smoker    Smokeless tobacco: Never Used   Substance Use Topics    Alcohol use: No     Comment: quit drinking 2012                                Counseling given: Not Answered      Vital Signs (Current):   Vitals:    06/08/22 1052   Weight: (!) 350 lb (158.8 kg)   Height: 5' 11.5\" (1.816 m)                                              BP Readings from Last 3 Encounters:   01/21/22 (!) 150/83   01/13/22 106/77   11/04/21 (!) 132/57       NPO Status:                                                   Date of last liquid consumption: 06/07/22                        Date of last solid food consumption: 06/07/22    BMI:   Wt Readings from Last 3 Encounters:   06/08/22 (!) 350 lb (158.8 kg)   01/20/22 (!) 350 lb (158.8 kg)   01/12/22 240 lb (108.9 kg)     Body mass index is 48.13 kg/m².     CBC:   Lab Results   Component Value Date    WBC 7.0 01/21/2022    RBC 3.59 01/21/2022    RBC 3.93 04/02/2012    HGB 9.5 01/21/2022    HCT 30.1 01/21/2022    MCV 83.8 01/21/2022    RDW 15.9 01/21/2022    PLT 88 01/21/2022     04/02/2012       CMP:   Lab Results   Component Value Date     01/21/2022    K 3.9 01/21/2022     01/21/2022    CO2 23 01/21/2022    BUN 25 01/21/2022    CREATININE 1.02 01/21/2022    GFRAA >60 01/21/2022    LABGLOM >60 01/21/2022    GLUCOSE 223 01/21/2022    GLUCOSE 102 08/30/2011    PROT 6.7 01/16/2022    CALCIUM 8.3 01/21/2022    BILITOT 0.27 01/16/2022    ALKPHOS 148 01/16/2022    AST 12 01/16/2022    ALT 11 01/16/2022 POC Tests: No results for input(s): POCGLU, POCNA, POCK, POCCL, POCBUN, POCHEMO, POCHCT in the last 72 hours. Coags:   Lab Results   Component Value Date    PROTIME 12.5 01/16/2022    PROTIME 11.6 03/30/2012    INR 0.9 01/16/2022    APTT 25.5 11/02/2021       HCG (If Applicable): No results found for: PREGTESTUR, PREGSERUM, HCG, HCGQUANT     ABGs:   Lab Results   Component Value Date    PHART 7.330 06/18/2014    PO2ART 84.0 06/18/2014    ZWY8YQE 68.0 06/18/2014    PVK2TUM 34.9 06/18/2014    V7UQYKGM 96.5 06/18/2014        Type & Screen (If Applicable):  No results found for: LABABO, LABRH    Drug/Infectious Status (If Applicable):  Lab Results   Component Value Date    HEPCAB NONREACTIVE 06/19/2014       COVID-19 Screening (If Applicable):   Lab Results   Component Value Date    COVID19 Not Detected 01/15/2022           Anesthesia Evaluation    Airway: Mallampati: II  TM distance: >3 FB   Neck ROM: full  Mouth opening: > = 3 FB   Dental:          Pulmonary:   (+) shortness of breath: chronic and no interval change,  sleep apnea:                             Cardiovascular:    (+) CHF:,             Echocardiogram reviewed                  Neuro/Psych:               GI/Hepatic/Renal:   (+) liver disease: portal hypertension,           Endo/Other:    (+) Diabetes, blood dyscrasia: thrombocytopenia:., .                 Abdominal:             Vascular:           Other Findings:           Anesthesia Plan      MAC     ASA 4                                   Felipe Avalos MD   6/8/2022

## 2022-06-08 NOTE — H&P
Pt does not know the type.  Hiatal hernia     History of alcohol abuse     Sober since 2013    Hyperlipidemia     not taking any medication    Hypertension     IBS (irritable bowel syndrome)     Incisional hernia with obstruction but no gangrene 5/20/2018    Klebsiella sepsis ([address] 75/36/7537    Metabolic encephalopathy     Morbid obesity (HCC)     Neuropathy     feet,toes    On home oxygen therapy     DME for home oxgygen at 2.5 lpm at HS and as needed    On home oxygen therapy     pt uses 2-3L NC @ home    Pancreatitis     x 5 episodes    Poisoning by insulin and oral hypoglycemic (antidiabetic) drugs, accidental (unintentional), initial encounter 1/12/2021    Primary osteoarthritis of right knee     Retention, urine 1/24/2018    SBO (small bowel obstruction) ([address] 5/19/2018    Septic shock ([address]     Sleep apnea     suspected juany, never has had PSG study. HAS NO MACHINE    Sleep apnea, obstructive     pt noncompliant w/ CPAP @ home.      Thyroid disease     Urinary bladder neurogenic dysfunction     Urinary tract infection associated with indwelling urethral catheter ([address] 11/4/2020        Past Surgical History:     Past Surgical History:   Procedure Laterality Date    ABDOMEN SURGERY      hernia repair x4 (ventral)    COLONOSCOPY      2012    CYSTOSCOPY  01/24/2018    CYSTOSCOPY  02/20/2019    CYSTOSCOPY N/A 2/20/2019    CYSTOSCOPY DILATION performed by Flor Story MD at [address] N/A 8/23/2019    CYSTOSCOPY/ DILATION NICOLE CATHETER EXCHANGE performed by Flor Story MD at [address], COLON, DIAGNOSTIC     [address]  05/20/2018    repair and reduction of recurrent incarcerated incisional hernia with mesh    [place] CYSTOURETHROSCOPY N/A 1/24/2018    CYSTOSCOPY Alejandrina Barnes performed by Flor Story MD at [address] Bilateral 8/22/2017    FOOT [address] [facility] with bone bx performed by Pb Aquino DPM at [address] EGD TRANSORAL BIOPSY SINGLE/MULTIPLE N/A 7/19/2017    EGD BIOPSY performed by Nataliia Davalos MD at [address]  07/19/2017    polypectomy    UPPER GASTROINTESTINAL ENDOSCOPY N/A 3/14/2019    EGD BIOPSY performed by Gurjit Fuller MD at [address] N/A 5/20/2018    REPAIR AND REDUCTION OF RECURRENT INCARCERATED INCISIONAL HERNIA WITH MESH performed by Lupillo Kimbrough MD at [facility]        Medications Prior to Admission:     Prior to Admission medications    Medication Sig Start Date End Date Taking? Authorizing Provider   torsemide (DEMADEX) 10 MG tablet Take 1 tablet by mouth daily 1/22/22   Stefany Clemente MD   midodrine (PROAMATINE) 10 MG tablet Take 1 tablet by mouth 3 times daily Hold for systolic blood pressure greater than 110 1/21/22   Stefany Clemente MD   ferrous sulfate (FE TABS 325) 325 (65 Fe) MG EC tablet Take 325 mg by mouth daily (with breakfast)    Historical Provider, MD   hydrOXYzine (ATARAX) 25 MG tablet Take 25 mg by mouth every 8 hours as needed for Itching    Historical Provider, MD   clonazePAM (KLONOPIN) 1 MG tablet Take 1 mg by mouth 2 times daily. Historical Provider, MD   naloxegol (MOVANTIK) 25 MG TABS tablet Take 25 mg by mouth every morning    Historical Provider, MD   nadolol (CORGARD) 40 MG tablet Take 40 mg by mouth daily    Historical Provider, MD   oxyCODONE-acetaminophen (PERCOCET) 7.5-325 MG per tablet Take 1 tablet by mouth every 6 hours as needed.      Historical Provider, MD   potassium chloride 20 MEQ/15ML (10%) oral solution Take 20 mEq by mouth daily    Historical Provider, MD   rifaximin (XIFAXAN) 550 MG tablet Take 550 mg by mouth 2 times daily    Historical Provider, MD   ibuprofen (ADVIL;MOTRIN) 600 MG tablet Take 600 mg by mouth every 6 hours as needed for Pain    Historical Provider, MD   insulin glargine (BASAGLAR KWIKPEN) 100 UNIT/ML injection pen Inject 80 Units into the skin 2 times daily    Historical Provider, MD   nystatin (MYCOSTATIN) 674231 UNIT/GM cream Apply topically 2 times daily as needed (to skin folds)    Historical Provider, MD   QUEtiapine (SEROQUEL XR) 50 MG extended release tablet Take 50 mg by mouth nightly    Historical Provider, MD   albuterol sulfate HFA (PROAIR HFA) 108 (90 Base) MCG/ACT inhaler Inhale 2 puffs into the lungs every 6 hours as needed for Wheezing    Historical Provider, MD   tamsulosin (FLOMAX) 0.4 MG capsule Take 1 capsule by mouth daily 1/24/18   Preethi Diaz MD   oxybutynin (DITROPAN) 5 MG tablet Take 1 tablet by mouth 2 times daily 12/12/17   Kb Xavier DO   aspirin 81 MG EC tablet Take 1 tablet by mouth daily 7/26/17   Dolores Butler DO   levothyroxine (SYNTHROID) 175 MCG tablet Take 175 mcg by mouth Daily     Historical Provider, MD   esomeprazole (NEXIUM) 40 MG capsule Take 40 mg by mouth 2 times daily     Historical Provider, MD   DULoxetine (CYMBALTA) 60 MG capsule Take 60 mg by mouth every morning     Historical Provider, MD        Allergies:     No known allergies    Social History:     Tobacco:    reports that he has never smoked. He has never used smokeless tobacco.  Alcohol:      reports no history of alcohol use. Drug Use:  reports no history of drug use. Family History:     Family History   Adopted: Yes   Problem Relation Age of Onset    No Known Problems Mother         Adopted    No Known Problems Father         Adopted       Review of Systems:     Positive and Negative as described in HPI.     CONSTITUTIONAL: negative for fevers, chills, sweats, fatigue, weight loss  HEENT: had cataracts extractions a month ago with IOI done and \" I can see w/o glasses\"  Only have a few teeth left Denies loose My nose bothers me a lot at the tip inside negative for vision, hearing changes, runny nose, throat pain  RESPIRATORY: Home O2 @ 2-3L/nc as needed uses often GALLEGOS but not as much at rest with O2. Uses Proair last 6/7/22   negative cough, congestion, wheezing. CARDIOVASCULAR: HTN, HLD, Hx CHF  negative for chest pain, palpitations. GASTROINTESTINAL: GERD  negative for nausea, vomiting, diarrhea, constipation, change in bowel habits, abdominal pain   GENITOURINARY: See HPI   INTEGUMENT: hx psoriasis negative for rash, skin lesions, easy bruising   HEMATOLOGIC/LYMPHATIC: swelling in both lower legs  negative for swelling/edema   ALLERGIC/IMMUNOLOGIC: negative for urticaria , itching  ENDOCRINE: DM II  On insulin glargine 80 u. See note above  Hx hypothyroid on synthroid  last yesterday negative increase in drinking, increase in urination, hot or cold intolerance  MUSCULOSKELETAL: Hx PVD with venous stasis both lower extremities  negative joint pains, muscle aches, swelling of joints  NEUROLOGICAL:  negative for headaches, dizziness, lightheadedness, numbness, pain, tingling extremities  BEHAVIOR/PSYCH:  negative for depression, anxiety    Physical Exam:   /77   Pulse 58   Temp 97.7 °F (36.5 °C)   Resp 18   Ht 5' 11.5\" (1.816 m)   Wt (!) 350 lb (158.8 kg)   SpO2 100%   BMI 48.13 kg/m²   Recent Labs     06/08/22  1059 06/08/22  1155 06/08/22  1218   POCGLU 67* 70* 78       General Appearance: morbidly obese male  alert, well appearing, and in no acute distress  Mental status: oriented to person, place, and time with normal affect  Head:  normocephalic, atraumatic.   Eye: bilateral IOI no icterus, redness, pupils equal and reactive, extraocular eye movements intact, conjunctiva clear  Ear: normal external ear, no discharge, hearing intact  Nose:  Scabbing inside right nare no drainage noted  Mouth: poor dentition with scattered few teeth remaining mucous membranes moist  Neck: supple, no carotid bruits, thyroid not palpable  Lungs: SpO2 100% on O2 @ 3L/nc diminished breath sounds throughout No wheezes, unlabored at rest w/o use of accessory muscles, normal effort  Cardiovascular: HR 58 asymptomatic bradycardic rate and regular rhythm, heart sounds distant with soft systolic murmur , no gallop, rub. Abdomen: obese with tenderness left mid to lower quadrant Clemons cath to bag draining yellow urine Soft, nondistended, normal bowel sounds  Neurologic: There are no new focal motor or sensory deficits, normal muscle tone and bulk, no abnormal sensation, normal speech, cranial nerves II through XII grossly intact  Skin: psoriatic lesions visible on extremities  No visible gross rashes, bruising or bleeding on exposed skin area  Extremities: PVD with venous stasis bilateral lower extremities with peripheral edema  No visible open lesions. Decreased pulses bilaterally  no pedal calf pain with palpation  Psych: normal affect     Investigations:      Laboratory Testing:  Recent Results (from the past 24 hour(s))   POC Glucose Fingerstick    Collection Time: 06/08/22 10:59 AM   Result Value Ref Range    POC Glucose 67 (L) 75 - 110 mg/dL   POC Glucose Fingerstick    Collection Time: 06/08/22 11:55 AM   Result Value Ref Range    POC Glucose 70 (L) 75 - 110 mg/dL   POC Glucose Fingerstick    Collection Time: 06/08/22 12:18 PM   Result Value Ref Range    POC Glucose 78 75 - 110 mg/dL       No results for input(s): HGB, HCT, WBC, MCV, PLATELET, NA, K, CL, CO2, BUN, CREATININE, GLUCOSE, INR, PROTIME, APTT, AST, ALT, LABALBU, HCG in the last 720 hours. No results for input(s): COVID19 in the last 720 hours. Imaging/Diagnostics:    No results found  Diagnosis:      1. Urinary tract infection without hematuria, site unspecified [N39.0]  2. Frequency of urination [R35.0]  3.  Urinary incontinence, unspecified type [R32]    Plans:     1. CYSTOSCOPY  WITH BLADDER IRRIGATION      SUNSHINE Marti CNP  6/8/2022  12:38 PM

## 2022-06-08 NOTE — OP NOTE
Operative Note      Patient: Bay Oliveira  YOB: 1965  MRN: [id number]    Date of Procedure: 6/8/2022    Pre-Op Diagnosis: Urinary tract infection without hematuria, site unspecified [N39.0]  Frequency of urination [R35.0]  Malfunctioning Clemons catheter  Urinary incontinence, unspecified type [R32]    Post-Op Diagnosis: Same and With bladder stones       Procedure(s):  CYSTOSCOPY  WITH BLADDER IRRIGATION    Surgeon(s):  Kobe Islas MD    Assistant:   * No surgical staff found *    Anesthesia: General    Estimated Blood Loss (mL): Minimal    Complications: None    Specimens:   ID Type Source Tests Collected by Time Destination   1 : STONE AND CHEMICAL ANALYSIS  Stone (Calculus) Kidney STONE ANALYSIS Kobe Islas MD 6/8/2022 1415        Implants:  * No implants in log *      Drains:   Urethral Catheter Triple-lumen 16 fr (Active)   Catheter Indications Urinary retention (acute or chronic), continuous bladder irrigation or bladder outlet obstruction 06/08/22 1058   Urine Color Yellow 06/08/22 1058   Urine Appearance Clear 06/08/22 1058   Output (mL) 700 mL 06/08/ 1101       Findings: Indications: 56-year-old male with morbid obesity, BMI 48.13 the patient was seen in consultation for evaluation and found to have a malfunctioning Clemons catheter with bladder spasms and leakage around the catheter as well as urethral and perineal pain patient scheduled for cystoscopy bladder irrigation catheter exchange    Detailed Description of Procedure:   He was brought to the operating room positioned in dorsolithotomy proper patient identification procedure identification prepping and draping in the usual sterile manner examination of the genitalia revealed evidence of traumatic hypospadias associated with a chronic indwelling Clemons.     We entered the bladder with the cystoscope, there was significant trauma to the prostate most likely associated with the Clemons balloon sitting in the prostate which contributed to the catheter malfunction and the bladder spasms. Examination of the interior of the bladder revealed chronic cystitis changes, a bladder stone was identified and it was removed and sent to pathology. Thorough bladder irrigation was carried out with evacuation of chronic purulent material noted on the bladder floor at the completion repeat cystoscopy did not reveal any other pathology.     We then proceeded with removing the cystoscope, and a three-way  Clemons catheter was inserted this was well-tolerated patient was then returned to recovery room in stable condition with bladder irrigation to be discontinued prior to discharge      Electronically signed by Aparna Espinoza MD on 6/8/2022 at 2:25 PM

## 2022-06-12 LAB
STONE COMPOSITION: NORMAL
STONE DESCRIPTION: NORMAL
STONE MASS: 6 MG

## 2022-12-28 ENCOUNTER — HOSPITAL ENCOUNTER (OUTPATIENT)
Dept: OCCUPATIONAL THERAPY | Age: 57
Setting detail: THERAPIES SERIES
Discharge: HOME | End: 2022-12-28

## 2022-12-28 NOTE — SIGNIFICANT EVENT
[x] [address] Occupational Therapy       [address] [facility]       Phone: [phone]       Fax: [phone] [] [facility] at [address] [place] [address]. [place], [place]       Phone: [phone]       Fax: [phone]          Occupational Therapy Cancel/No Show note    Date: 2022  Patient: Mo Young  : 1965  MRN: [id number]    Cancels/No Shows to date: 1    For today's appointment patient:    [x]  Cancelled    [x] Rescheduled appointment    [] No-show     Reason given by patient:    []  Patient ill    []  Conflicting appointment    [x] No transportation      [] Conflict with work    [] No reason given    [] Weather related    [] COVID-19    [] Other:      Comments: 22-Cx patient called to cancel/reschedule, new ride service, needed to reschedule til  of the year, rescheduled to 23      [x] Next appointment was confirmed      Electronically signed by:  Estelita Ibrahim, RAMOS, CLT, OTR/L

## 2023-01-04 ENCOUNTER — HOSPITAL ENCOUNTER (OUTPATIENT)
Dept: OCCUPATIONAL THERAPY | Age: 58
Setting detail: THERAPIES SERIES
Discharge: HOME | End: 2023-01-04

## 2023-01-04 NOTE — SIGNIFICANT EVENT
[x] [address] Occupational Therapy       [address] [facility], 1st Floor       Phone: [phone]       Fax: [phone] [] [facility] at [address] [place] [address]. [place], [place]       Phone: [phone]       Fax: [phone]          Occupational Therapy Cancel/No Show note    Date: 2023  Patient: Rogelio Fulton  : 1965  MRN: [id number]    Cancels/No Shows to date: 3   Scheduling conflicts   Transportation  2022 NS    For today's appointment patient:    [x]  Cancelled    [] Rescheduled appointment    [] No-show     Reason given by patient:    []  Patient ill    []  Conflicting appointment    [] No transportation      [] Conflict with work    [] No reason given    [] Weather related    [] [id number]    [x] Other:   Scheduling conflicts per pt      Comments:     2455 Schedule conflict. 22-Cx patient called to cancel/reschedule, new ride service, needed to reschedule til  of the year, rescheduled to 2022 NS and rescheduled appt      [] Next appointment was confirmed      Electronically signed by:  RAMOS Ferrari, CLT, OTR/L

## 2023-02-08 ENCOUNTER — HOSPITAL ENCOUNTER (OUTPATIENT)
Dept: OCCUPATIONAL THERAPY | Age: 58
Setting detail: THERAPIES SERIES
Discharge: HOME | End: 2023-02-08

## 2023-02-08 NOTE — SIGNIFICANT EVENT
[x] [address] [facility] Occupational Therapy       [address] [facility], 1st Floor       Phone: [phone]       Fax: [phone] [] [facility] Occupational  Therapy at [address] [place] [address]. [place], [place]       Phone: [phone]       Fax: [phone]          Occupational Therapy Cancel/No Show note    Date: 2023  Patient: Pamela Hayes  : 1965  MRN: [id number]    Cancels/No Shows to date: 11      For today's appointment patient:    [x]  Cancelled    [] Rescheduled appointment    [] No-show     Reason given by patient:    []  Patient ill    []  Conflicting appointment    [x] No transportation      [] Conflict with work    [] No reason given    [] Weather related    [] [id number]    [] Other:      Comments:       []       Electronically signed by:  Sierra Marion, RAMOS, CLT, OTR/L

## 2023-04-10 ENCOUNTER — APPOINTMENT (OUTPATIENT)
Dept: CT IMAGING | Age: 58
DRG: 640 | End: 2023-04-10
Payer: MEDICARE

## 2023-04-10 ENCOUNTER — HOSPITAL ENCOUNTER (INPATIENT)
Age: 58
LOS: 7 days | Discharge: HOME | DRG: 640 | End: 2023-04-17
Attending: EMERGENCY MEDICINE | Admitting: STUDENT IN AN ORGANIZED HEALTH CARE EDUCATION/TRAINING PROGRAM
Payer: MEDICARE

## 2023-04-10 DIAGNOSIS — I87.8 VENOUS STASIS: Primary | ICD-10-CM

## 2023-04-10 LAB
ABSOLUTE EOS #: 0.21 K/UL (ref 0–0.4)
ABSOLUTE IMMATURE GRANULOCYTE: 0 K/UL (ref 0–0.3)
ABSOLUTE LYMPH #: 0.42 K/UL (ref 1–4.8)
ABSOLUTE MONO #: 0.95 K/UL (ref 0.2–0.8)
ALBUMIN SERPL-MCNC: 3.2 G/DL (ref 3.5–5.2)
ALP SERPL-CCNC: 241 U/L (ref 40–129)
ALT SERPL-CCNC: 19 U/L (ref 5–41)
AMMONIA PLAS-SCNC: 46 UMOL/L (ref 16–60)
ANION GAP SERPL CALCULATED.3IONS-SCNC: 5 MMOL/L (ref 9–17)
AST SERPL-CCNC: 22 U/L
BASOPHILS # BLD: 0 %
BASOPHILS ABSOLUTE: 0 K/UL (ref 0–0.2)
BILIRUB SERPL-MCNC: 0.7 MG/DL (ref 0.3–1.2)
BNP SERPL-MCNC: 1622 PG/ML
BUN SERPL-MCNC: 23 MG/DL (ref 6–20)
BUN/CREAT BLD: 23 (ref 9–20)
CALCIUM SERPL-MCNC: 9.1 MG/DL (ref 8.6–10.4)
CHLORIDE SERPL-SCNC: 77 MMOL/L (ref 98–107)
CO2 SERPL-SCNC: 40 MMOL/L (ref 20–31)
CREAT SERPL-MCNC: 1 MG/DL (ref 0.7–1.2)
EOSINOPHILS RELATIVE PERCENT: 2 % (ref 1–4)
GFR SERPL CREATININE-BSD FRML MDRD: >60 ML/MIN/1.73M2
GLUCOSE SERPL-MCNC: 133 MG/DL (ref 70–99)
HCT VFR BLD AUTO: 27.8 % (ref 40.7–50.3)
HGB BLD-MCNC: 8.3 G/DL (ref 13–17)
IMMATURE GRANULOCYTES: 0 %
LYMPHOCYTES # BLD: 4 % (ref 24–44)
MCH RBC QN AUTO: 25.8 PG (ref 25.2–33.5)
MCHC RBC AUTO-ENTMCNC: 29.9 G/DL (ref 28.4–34.8)
MCV RBC AUTO: 86.3 FL (ref 82.6–102.9)
MONOCYTES # BLD: 9 % (ref 1–7)
MORPHOLOGY: ABNORMAL
MORPHOLOGY: ABNORMAL
NRBC AUTOMATED: 0 PER 100 WBC
OSMOLALITY URINE: 179 MOSM/KG (ref 80–1300)
PDW BLD-RTO: 13.2 % (ref 11.8–14.4)
PLATELET # BLD AUTO: 110 K/UL (ref 138–453)
PMV BLD AUTO: 9.3 FL (ref 8.1–13.5)
POTASSIUM SERPL-SCNC: 3.9 MMOL/L (ref 3.7–5.3)
PROT SERPL-MCNC: 7.1 G/DL (ref 6.4–8.3)
RBC # BLD: 3.22 M/UL (ref 4.21–5.77)
SEG NEUTROPHILS: 85 % (ref 36–66)
SEGMENTED NEUTROPHILS ABSOLUTE COUNT: 9.02 K/UL (ref 1.8–7.7)
SERUM OSMOLALITY: 271 MOSM/KG (ref 275–295)
SODIUM SERPL-SCNC: 122 MMOL/L (ref 135–144)
T4 FREE SERPL-MCNC: 0.95 NG/DL (ref 0.93–1.7)
TSH SERPL-ACNC: 11.93 UIU/ML (ref 0.3–5)
WBC # BLD AUTO: 10.6 K/UL (ref 3.5–11.3)

## 2023-04-10 PROCEDURE — 82140 ASSAY OF AMMONIA: CPT

## 2023-04-10 PROCEDURE — 1200000000 HC SEMI PRIVATE

## 2023-04-10 PROCEDURE — 6370000000 HC RX 637 (ALT 250 FOR IP): Performed by: STUDENT IN AN ORGANIZED HEALTH CARE EDUCATION/TRAINING PROGRAM

## 2023-04-10 PROCEDURE — 80053 COMPREHEN METABOLIC PANEL: CPT

## 2023-04-10 PROCEDURE — 85025 COMPLETE CBC W/AUTO DIFF WBC: CPT

## 2023-04-10 PROCEDURE — 99285 EMERGENCY DEPT VISIT HI MDM: CPT

## 2023-04-10 PROCEDURE — 6370000000 HC RX 637 (ALT 250 FOR IP): Performed by: NURSE PRACTITIONER

## 2023-04-10 PROCEDURE — 84439 ASSAY OF FREE THYROXINE: CPT

## 2023-04-10 PROCEDURE — 83930 ASSAY OF BLOOD OSMOLALITY: CPT

## 2023-04-10 PROCEDURE — 84443 ASSAY THYROID STIM HORMONE: CPT

## 2023-04-10 PROCEDURE — 83935 ASSAY OF URINE OSMOLALITY: CPT

## 2023-04-10 PROCEDURE — 99222 1ST HOSP IP/OBS MODERATE 55: CPT | Performed by: NURSE PRACTITIONER

## 2023-04-10 PROCEDURE — 84300 ASSAY OF URINE SODIUM: CPT

## 2023-04-10 PROCEDURE — 83880 ASSAY OF NATRIURETIC PEPTIDE: CPT

## 2023-04-10 RX ORDER — ACETAMINOPHEN 650 MG/1
650 SUPPOSITORY RECTAL EVERY 6 HOURS PRN
Status: DISCONTINUED | OUTPATIENT
Start: 2023-04-10 | End: 2023-04-18 | Stop reason: HOSPADM

## 2023-04-10 RX ORDER — ENOXAPARIN SODIUM 100 MG/ML
60 INJECTION SUBCUTANEOUS 2 TIMES DAILY
Status: DISCONTINUED | OUTPATIENT
Start: 2023-04-10 | End: 2023-04-14

## 2023-04-10 RX ORDER — POTASSIUM CHLORIDE 20 MEQ/1
40 TABLET, EXTENDED RELEASE ORAL PRN
Status: DISCONTINUED | OUTPATIENT
Start: 2023-04-10 | End: 2023-04-18 | Stop reason: HOSPADM

## 2023-04-10 RX ORDER — SODIUM CHLORIDE 0.9 % (FLUSH) 0.9 %
5-40 SYRINGE (ML) INJECTION EVERY 12 HOURS SCHEDULED
Status: DISCONTINUED | OUTPATIENT
Start: 2023-04-10 | End: 2023-04-18 | Stop reason: HOSPADM

## 2023-04-10 RX ORDER — SODIUM CHLORIDE 9 MG/ML
INJECTION, SOLUTION INTRAVENOUS PRN
Status: DISCONTINUED | OUTPATIENT
Start: 2023-04-10 | End: 2023-04-18 | Stop reason: HOSPADM

## 2023-04-10 RX ORDER — POTASSIUM CHLORIDE 7.45 MG/ML
10 INJECTION INTRAVENOUS PRN
Status: DISCONTINUED | OUTPATIENT
Start: 2023-04-10 | End: 2023-04-18 | Stop reason: HOSPADM

## 2023-04-10 RX ORDER — MIDODRINE HYDROCHLORIDE 10 MG/1
10 TABLET ORAL 3 TIMES DAILY
Status: DISCONTINUED | OUTPATIENT
Start: 2023-04-10 | End: 2023-04-12

## 2023-04-10 RX ORDER — ONDANSETRON 2 MG/ML
4 INJECTION INTRAMUSCULAR; INTRAVENOUS EVERY 6 HOURS PRN
Status: DISCONTINUED | OUTPATIENT
Start: 2023-04-10 | End: 2023-04-18 | Stop reason: HOSPADM

## 2023-04-10 RX ORDER — MAGNESIUM SULFATE 1 G/100ML
1000 INJECTION INTRAVENOUS PRN
Status: DISCONTINUED | OUTPATIENT
Start: 2023-04-10 | End: 2023-04-18 | Stop reason: HOSPADM

## 2023-04-10 RX ORDER — TAMSULOSIN HYDROCHLORIDE 0.4 MG/1
0.4 CAPSULE ORAL DAILY
Status: DISCONTINUED | OUTPATIENT
Start: 2023-04-11 | End: 2023-04-18 | Stop reason: HOSPADM

## 2023-04-10 RX ORDER — POLYETHYLENE GLYCOL 3350 17 G/17G
17 POWDER, FOR SOLUTION ORAL DAILY PRN
Status: DISCONTINUED | OUTPATIENT
Start: 2023-04-10 | End: 2023-04-18 | Stop reason: HOSPADM

## 2023-04-10 RX ORDER — DEXTROSE MONOHYDRATE 100 MG/ML
INJECTION, SOLUTION INTRAVENOUS CONTINUOUS PRN
Status: DISCONTINUED | OUTPATIENT
Start: 2023-04-10 | End: 2023-04-18 | Stop reason: HOSPADM

## 2023-04-10 RX ORDER — INSULIN GLARGINE 100 [IU]/ML
50 INJECTION, SOLUTION SUBCUTANEOUS 2 TIMES DAILY
Status: DISCONTINUED | OUTPATIENT
Start: 2023-04-10 | End: 2023-04-18 | Stop reason: HOSPADM

## 2023-04-10 RX ORDER — ACETAMINOPHEN 325 MG/1
650 TABLET ORAL EVERY 6 HOURS PRN
Status: DISCONTINUED | OUTPATIENT
Start: 2023-04-10 | End: 2023-04-18 | Stop reason: HOSPADM

## 2023-04-10 RX ORDER — SENNA PLUS 8.6 MG/1
2 TABLET ORAL 2 TIMES DAILY
Status: DISCONTINUED | OUTPATIENT
Start: 2023-04-10 | End: 2023-04-18 | Stop reason: HOSPADM

## 2023-04-10 RX ORDER — INSULIN LISPRO 100 [IU]/ML
0-4 INJECTION, SOLUTION INTRAVENOUS; SUBCUTANEOUS NIGHTLY
Status: DISCONTINUED | OUTPATIENT
Start: 2023-04-10 | End: 2023-04-18 | Stop reason: HOSPADM

## 2023-04-10 RX ORDER — ONDANSETRON 4 MG/1
4 TABLET, ORALLY DISINTEGRATING ORAL EVERY 8 HOURS PRN
Status: DISCONTINUED | OUTPATIENT
Start: 2023-04-10 | End: 2023-04-18 | Stop reason: HOSPADM

## 2023-04-10 RX ORDER — INSULIN LISPRO 100 [IU]/ML
0-8 INJECTION, SOLUTION INTRAVENOUS; SUBCUTANEOUS
Status: DISCONTINUED | OUTPATIENT
Start: 2023-04-11 | End: 2023-04-18 | Stop reason: HOSPADM

## 2023-04-10 RX ORDER — SODIUM CHLORIDE 0.9 % (FLUSH) 0.9 %
10 SYRINGE (ML) INJECTION PRN
Status: DISCONTINUED | OUTPATIENT
Start: 2023-04-10 | End: 2023-04-18 | Stop reason: HOSPADM

## 2023-04-10 RX ORDER — ASPIRIN 81 MG/1
81 TABLET ORAL DAILY
Status: DISCONTINUED | OUTPATIENT
Start: 2023-04-11 | End: 2023-04-18 | Stop reason: HOSPADM

## 2023-04-10 RX ADMIN — SENNOSIDES 17.2 MG: 8.6 TABLET, COATED ORAL at 23:38

## 2023-04-10 RX ADMIN — GLYCERIN 2 G: 2 SUPPOSITORY RECTAL at 18:26

## 2023-04-10 ASSESSMENT — ENCOUNTER SYMPTOMS
SHORTNESS OF BREATH: 1
ABDOMINAL PAIN: 0
PHOTOPHOBIA: 0
NAUSEA: 0
SINUS PRESSURE: 0
BACK PAIN: 1
COLOR CHANGE: 0
SINUS PAIN: 0
SHORTNESS OF BREATH: 0
VOMITING: 0
WHEEZING: 0
CONSTIPATION: 1
BACK PAIN: 0
CHEST TIGHTNESS: 0
BLOOD IN STOOL: 0
DIARRHEA: 0
COUGH: 0
SORE THROAT: 0

## 2023-04-10 ASSESSMENT — PAIN SCALES - GENERAL: PAINLEVEL_OUTOF10: 7

## 2023-04-10 ASSESSMENT — PAIN - FUNCTIONAL ASSESSMENT: PAIN_FUNCTIONAL_ASSESSMENT: NONE - DENIES PAIN

## 2023-04-11 LAB
AMORPHOUS: ABNORMAL
ANION GAP SERPL CALCULATED.3IONS-SCNC: 6 MMOL/L (ref 9–17)
ANION GAP SERPL CALCULATED.3IONS-SCNC: 7 MMOL/L (ref 9–17)
ANION GAP SERPL CALCULATED.3IONS-SCNC: 7 MMOL/L (ref 9–17)
ANION GAP SERPL CALCULATED.3IONS-SCNC: 8 MMOL/L (ref 9–17)
BACTERIA: ABNORMAL
BILIRUBIN URINE: NEGATIVE
BUN SERPL-MCNC: 18 MG/DL (ref 6–20)
BUN SERPL-MCNC: 19 MG/DL (ref 6–20)
BUN SERPL-MCNC: 20 MG/DL (ref 6–20)
BUN SERPL-MCNC: 21 MG/DL (ref 6–20)
BUN SERPL-MCNC: 21 MG/DL (ref 6–20)
BUN SERPL-MCNC: 22 MG/DL (ref 6–20)
BUN/CREAT BLD: 19 (ref 9–20)
BUN/CREAT BLD: 20 (ref 9–20)
BUN/CREAT BLD: 20 (ref 9–20)
BUN/CREAT BLD: 21 (ref 9–20)
CALCIUM SERPL-MCNC: 9.3 MG/DL (ref 8.6–10.4)
CALCIUM SERPL-MCNC: 9.4 MG/DL (ref 8.6–10.4)
CALCIUM SERPL-MCNC: 9.5 MG/DL (ref 8.6–10.4)
CHLORIDE SERPL-SCNC: 77 MMOL/L (ref 98–107)
CHLORIDE SERPL-SCNC: 79 MMOL/L (ref 98–107)
CHLORIDE SERPL-SCNC: 80 MMOL/L (ref 98–107)
CHLORIDE SERPL-SCNC: 80 MMOL/L (ref 98–107)
CHLORIDE SERPL-SCNC: 81 MMOL/L (ref 98–107)
CHLORIDE SERPL-SCNC: 81 MMOL/L (ref 98–107)
CO2 SERPL-SCNC: 39 MMOL/L (ref 20–31)
CO2 SERPL-SCNC: 41 MMOL/L (ref 20–31)
COLOR: YELLOW
CREAT SERPL-MCNC: 0.95 MG/DL (ref 0.7–1.2)
CREAT SERPL-MCNC: 0.95 MG/DL (ref 0.7–1.2)
CREAT SERPL-MCNC: 0.97 MG/DL (ref 0.7–1.2)
CREAT SERPL-MCNC: 0.98 MG/DL (ref 0.7–1.2)
CREAT SERPL-MCNC: 1.04 MG/DL (ref 0.7–1.2)
CREAT SERPL-MCNC: 1.07 MG/DL (ref 0.7–1.2)
EPITHELIAL CELLS UA: ABNORMAL /HPF (ref 0–5)
EST. AVERAGE GLUCOSE BLD GHB EST-MCNC: 111 MG/DL
GFR SERPL CREATININE-BSD FRML MDRD: >60 ML/MIN/1.73M2
GLUCOSE BLD-MCNC: 103 MG/DL (ref 75–110)
GLUCOSE BLD-MCNC: 137 MG/DL (ref 75–110)
GLUCOSE BLD-MCNC: 145 MG/DL (ref 75–110)
GLUCOSE BLD-MCNC: 98 MG/DL (ref 75–110)
GLUCOSE SERPL-MCNC: 100 MG/DL (ref 70–99)
GLUCOSE SERPL-MCNC: 119 MG/DL (ref 70–99)
GLUCOSE SERPL-MCNC: 122 MG/DL (ref 70–99)
GLUCOSE SERPL-MCNC: 133 MG/DL (ref 70–99)
GLUCOSE SERPL-MCNC: 91 MG/DL (ref 70–99)
GLUCOSE SERPL-MCNC: 97 MG/DL (ref 70–99)
GLUCOSE UR STRIP.AUTO-MCNC: NEGATIVE MG/DL
HBA1C MFR BLD: 5.5 % (ref 4–6)
INR PPP: 1
KETONES UR STRIP.AUTO-MCNC: NEGATIVE MG/DL
LEUKOCYTE ESTERASE UR QL STRIP.AUTO: ABNORMAL
NITRITE UR QL STRIP.AUTO: POSITIVE
POTASSIUM SERPL-SCNC: 3.9 MMOL/L (ref 3.7–5.3)
POTASSIUM SERPL-SCNC: 4 MMOL/L (ref 3.7–5.3)
POTASSIUM SERPL-SCNC: 4 MMOL/L (ref 3.7–5.3)
PROT UR STRIP.AUTO-MCNC: >=9 MG/DL (ref 5–8)
PROT UR STRIP.AUTO-MCNC: NEGATIVE MG/DL
PROTHROMBIN TIME: 13.2 SEC (ref 11.5–14.2)
RBC CLUMPS #/AREA URNS AUTO: ABNORMAL /HPF (ref 0–2)
SODIUM SERPL-SCNC: 122 MMOL/L (ref 135–144)
SODIUM SERPL-SCNC: 124 MMOL/L (ref 135–144)
SODIUM SERPL-SCNC: 126 MMOL/L (ref 135–144)
SODIUM SERPL-SCNC: 127 MMOL/L (ref 135–144)
SODIUM SERPL-SCNC: 127 MMOL/L (ref 135–144)
SODIUM SERPL-SCNC: 128 MMOL/L (ref 135–144)
SODIUM,UR: <20 MMOL/L
SPECIFIC GRAVITY UA: 1.01 (ref 1–1.03)
TURBIDITY: CLEAR
URINE HGB: ABNORMAL
UROBILINOGEN, URINE: NORMAL
WBC UA: ABNORMAL /HPF (ref 0–5)

## 2023-04-11 PROCEDURE — 81001 URINALYSIS AUTO W/SCOPE: CPT

## 2023-04-11 PROCEDURE — 87077 CULTURE AEROBIC IDENTIFY: CPT

## 2023-04-11 PROCEDURE — 6370000000 HC RX 637 (ALT 250 FOR IP): Performed by: NURSE PRACTITIONER

## 2023-04-11 PROCEDURE — 97530 THERAPEUTIC ACTIVITIES: CPT

## 2023-04-11 PROCEDURE — 87186 SC STD MICRODIL/AGAR DIL: CPT

## 2023-04-11 PROCEDURE — 80048 BASIC METABOLIC PNL TOTAL CA: CPT

## 2023-04-11 PROCEDURE — 6360000002 HC RX W HCPCS: Performed by: NURSE PRACTITIONER

## 2023-04-11 PROCEDURE — 82947 ASSAY GLUCOSE BLOOD QUANT: CPT

## 2023-04-11 PROCEDURE — 36415 COLL VENOUS BLD VENIPUNCTURE: CPT

## 2023-04-11 PROCEDURE — 97166 OT EVAL MOD COMPLEX 45 MIN: CPT

## 2023-04-11 PROCEDURE — 87086 URINE CULTURE/COLONY COUNT: CPT

## 2023-04-11 PROCEDURE — G0378 HOSPITAL OBSERVATION PER HR: HCPCS

## 2023-04-11 PROCEDURE — 2580000003 HC RX 258: Performed by: NURSE PRACTITIONER

## 2023-04-11 PROCEDURE — 83036 HEMOGLOBIN GLYCOSYLATED A1C: CPT

## 2023-04-11 PROCEDURE — 2700000000 HC OXYGEN THERAPY PER DAY

## 2023-04-11 PROCEDURE — 85610 PROTHROMBIN TIME: CPT

## 2023-04-11 PROCEDURE — 97162 PT EVAL MOD COMPLEX 30 MIN: CPT

## 2023-04-11 RX ORDER — LACTULOSE 10 G/15ML
10 SOLUTION ORAL DAILY PRN
COMMUNITY

## 2023-04-11 RX ORDER — LEVOTHYROXINE SODIUM 0.2 MG/1
200 TABLET ORAL DAILY
Status: DISCONTINUED | OUTPATIENT
Start: 2023-04-11 | End: 2023-04-11

## 2023-04-11 RX ORDER — CLOBETASOL PROPIONATE 0.5 MG/G
OINTMENT TOPICAL 2 TIMES DAILY
COMMUNITY

## 2023-04-11 RX ORDER — BUMETANIDE 2 MG/1
2 TABLET ORAL 2 TIMES DAILY WITH MEALS
Status: ON HOLD | COMMUNITY
End: 2023-04-17 | Stop reason: HOSPADM

## 2023-04-11 RX ORDER — TROSPIUM CHLORIDE 20 MG/1
20 TABLET, FILM COATED ORAL 2 TIMES DAILY
COMMUNITY

## 2023-04-11 RX ORDER — POTASSIUM CHLORIDE 20 MEQ/1
20 TABLET, EXTENDED RELEASE ORAL DAILY
COMMUNITY

## 2023-04-11 RX ORDER — NYSTATIN 100000 U/G
CREAM TOPICAL 2 TIMES DAILY
Status: DISCONTINUED | OUTPATIENT
Start: 2023-04-11 | End: 2023-04-18 | Stop reason: HOSPADM

## 2023-04-11 RX ORDER — DOCUSATE SODIUM 100 MG/1
100 CAPSULE, LIQUID FILLED ORAL DAILY
COMMUNITY

## 2023-04-11 RX ORDER — SODIUM CHLORIDE 9 MG/ML
INJECTION, SOLUTION INTRAVENOUS CONTINUOUS
Status: DISCONTINUED | OUTPATIENT
Start: 2023-04-11 | End: 2023-04-11

## 2023-04-11 RX ORDER — LEVOTHYROXINE SODIUM 0.1 MG/1
200 TABLET ORAL DAILY
Status: DISCONTINUED | OUTPATIENT
Start: 2023-04-12 | End: 2023-04-18 | Stop reason: HOSPADM

## 2023-04-11 RX ORDER — KETOCONAZOLE 20 MG/ML
SHAMPOO TOPICAL DAILY PRN
COMMUNITY

## 2023-04-11 RX ORDER — NITROFURANTOIN 25; 75 MG/1; MG/1
100 CAPSULE ORAL DAILY
COMMUNITY

## 2023-04-11 RX ADMIN — LEVOTHYROXINE SODIUM 175 MCG: 150 TABLET ORAL at 05:08

## 2023-04-11 RX ADMIN — TAMSULOSIN HYDROCHLORIDE 0.4 MG: 0.4 CAPSULE ORAL at 08:55

## 2023-04-11 RX ADMIN — CEFTRIAXONE SODIUM 1000 MG: 1 INJECTION, POWDER, FOR SOLUTION INTRAMUSCULAR; INTRAVENOUS at 08:53

## 2023-04-11 RX ADMIN — ENOXAPARIN SODIUM 60 MG: 100 INJECTION SUBCUTANEOUS at 08:55

## 2023-04-11 RX ADMIN — SODIUM CHLORIDE: 9 INJECTION, SOLUTION INTRAVENOUS at 02:14

## 2023-04-11 RX ADMIN — SENNOSIDES 17.2 MG: 8.6 TABLET, COATED ORAL at 08:55

## 2023-04-11 RX ADMIN — NYSTATIN: 100000 CREAM TOPICAL at 23:31

## 2023-04-11 RX ADMIN — ACETAMINOPHEN 650 MG: 325 TABLET ORAL at 05:08

## 2023-04-11 RX ADMIN — ASPIRIN 81 MG: 81 TABLET, COATED ORAL at 08:55

## 2023-04-11 ASSESSMENT — ENCOUNTER SYMPTOMS
CHOKING: 0
SHORTNESS OF BREATH: 0
COUGH: 0
CONSTIPATION: 0
ABDOMINAL PAIN: 0
BACK PAIN: 0
CHEST TIGHTNESS: 0
RHINORRHEA: 0
VOICE CHANGE: 0
WHEEZING: 0
DIARRHEA: 0
SINUS PRESSURE: 0
NAUSEA: 0
VOMITING: 0

## 2023-04-11 ASSESSMENT — PAIN DESCRIPTION - LOCATION: LOCATION: NECK

## 2023-04-11 ASSESSMENT — PAIN DESCRIPTION - PAIN TYPE: TYPE: CHRONIC PAIN

## 2023-04-11 ASSESSMENT — PAIN - FUNCTIONAL ASSESSMENT: PAIN_FUNCTIONAL_ASSESSMENT: ACTIVITIES ARE NOT PREVENTED

## 2023-04-11 ASSESSMENT — PAIN DESCRIPTION - DESCRIPTORS: DESCRIPTORS: ACHING

## 2023-04-11 ASSESSMENT — PAIN DESCRIPTION - ONSET: ONSET: ON-GOING

## 2023-04-11 ASSESSMENT — PAIN DESCRIPTION - FREQUENCY: FREQUENCY: INTERMITTENT

## 2023-04-11 ASSESSMENT — PAIN DESCRIPTION - ORIENTATION: ORIENTATION: POSTERIOR

## 2023-04-11 ASSESSMENT — PAIN SCALES - GENERAL: PAINLEVEL_OUTOF10: 3

## 2023-04-12 LAB
ANION GAP SERPL CALCULATED.3IONS-SCNC: 5 MMOL/L (ref 9–17)
ANION GAP SERPL CALCULATED.3IONS-SCNC: 5 MMOL/L (ref 9–17)
ANION GAP SERPL CALCULATED.3IONS-SCNC: 7 MMOL/L (ref 9–17)
ANION GAP SERPL CALCULATED.3IONS-SCNC: 7 MMOL/L (ref 9–17)
BUN SERPL-MCNC: 17 MG/DL (ref 6–20)
BUN SERPL-MCNC: 17 MG/DL (ref 6–20)
BUN SERPL-MCNC: 18 MG/DL (ref 6–20)
BUN SERPL-MCNC: 18 MG/DL (ref 6–20)
BUN/CREAT BLD: 18 (ref 9–20)
BUN/CREAT BLD: 19 (ref 9–20)
BUN/CREAT BLD: 20 (ref 9–20)
BUN/CREAT BLD: 20 (ref 9–20)
CALCIUM SERPL-MCNC: 9.1 MG/DL (ref 8.6–10.4)
CALCIUM SERPL-MCNC: 9.1 MG/DL (ref 8.6–10.4)
CALCIUM SERPL-MCNC: 9.2 MG/DL (ref 8.6–10.4)
CALCIUM SERPL-MCNC: 9.5 MG/DL (ref 8.6–10.4)
CHLORIDE SERPL-SCNC: 81 MMOL/L (ref 98–107)
CHLORIDE SERPL-SCNC: 81 MMOL/L (ref 98–107)
CHLORIDE SERPL-SCNC: 82 MMOL/L (ref 98–107)
CHLORIDE SERPL-SCNC: 83 MMOL/L (ref 98–107)
CO2 SERPL-SCNC: 40 MMOL/L (ref 20–31)
CO2 SERPL-SCNC: 40 MMOL/L (ref 20–31)
CO2 SERPL-SCNC: 42 MMOL/L (ref 20–31)
CO2 SERPL-SCNC: 44 MMOL/L (ref 20–31)
CREAT SERPL-MCNC: 0.86 MG/DL (ref 0.7–1.2)
CREAT SERPL-MCNC: 0.89 MG/DL (ref 0.7–1.2)
CREAT SERPL-MCNC: 0.9 MG/DL (ref 0.7–1.2)
CREAT SERPL-MCNC: 0.99 MG/DL (ref 0.7–1.2)
GFR SERPL CREATININE-BSD FRML MDRD: >60 ML/MIN/1.73M2
GLUCOSE BLD-MCNC: 131 MG/DL (ref 75–110)
GLUCOSE BLD-MCNC: 152 MG/DL (ref 75–110)
GLUCOSE BLD-MCNC: 153 MG/DL (ref 75–110)
GLUCOSE BLD-MCNC: 189 MG/DL (ref 75–110)
GLUCOSE SERPL-MCNC: 123 MG/DL (ref 70–99)
GLUCOSE SERPL-MCNC: 145 MG/DL (ref 70–99)
GLUCOSE SERPL-MCNC: 147 MG/DL (ref 70–99)
GLUCOSE SERPL-MCNC: 148 MG/DL (ref 70–99)
POTASSIUM SERPL-SCNC: 3.6 MMOL/L (ref 3.7–5.3)
POTASSIUM SERPL-SCNC: 3.6 MMOL/L (ref 3.7–5.3)
POTASSIUM SERPL-SCNC: 3.8 MMOL/L (ref 3.7–5.3)
POTASSIUM SERPL-SCNC: 3.9 MMOL/L (ref 3.7–5.3)
SODIUM SERPL-SCNC: 128 MMOL/L (ref 135–144)
SODIUM SERPL-SCNC: 128 MMOL/L (ref 135–144)
SODIUM SERPL-SCNC: 130 MMOL/L (ref 135–144)
SODIUM SERPL-SCNC: 131 MMOL/L (ref 135–144)

## 2023-04-12 PROCEDURE — 6360000002 HC RX W HCPCS: Performed by: NURSE PRACTITIONER

## 2023-04-12 PROCEDURE — 6370000000 HC RX 637 (ALT 250 FOR IP): Performed by: NURSE PRACTITIONER

## 2023-04-12 PROCEDURE — 2580000003 HC RX 258: Performed by: NURSE PRACTITIONER

## 2023-04-12 PROCEDURE — G0378 HOSPITAL OBSERVATION PER HR: HCPCS

## 2023-04-12 PROCEDURE — 1200000000 HC SEMI PRIVATE

## 2023-04-12 PROCEDURE — 99231 SBSQ HOSP IP/OBS SF/LOW 25: CPT | Performed by: STUDENT IN AN ORGANIZED HEALTH CARE EDUCATION/TRAINING PROGRAM

## 2023-04-12 PROCEDURE — 6370000000 HC RX 637 (ALT 250 FOR IP): Performed by: FAMILY MEDICINE

## 2023-04-12 PROCEDURE — 5A09457 ASSISTANCE WITH RESPIRATORY VENTILATION, 24-96 CONSECUTIVE HOURS, CONTINUOUS POSITIVE AIRWAY PRESSURE: ICD-10-PCS | Performed by: STUDENT IN AN ORGANIZED HEALTH CARE EDUCATION/TRAINING PROGRAM

## 2023-04-12 PROCEDURE — 97110 THERAPEUTIC EXERCISES: CPT

## 2023-04-12 PROCEDURE — 82947 ASSAY GLUCOSE BLOOD QUANT: CPT

## 2023-04-12 PROCEDURE — 36415 COLL VENOUS BLD VENIPUNCTURE: CPT

## 2023-04-12 PROCEDURE — 80048 BASIC METABOLIC PNL TOTAL CA: CPT

## 2023-04-12 PROCEDURE — 97535 SELF CARE MNGMENT TRAINING: CPT

## 2023-04-12 PROCEDURE — 97530 THERAPEUTIC ACTIVITIES: CPT

## 2023-04-12 RX ORDER — CLONAZEPAM 0.5 MG/1
1 TABLET ORAL 2 TIMES DAILY PRN
Status: DISCONTINUED | OUTPATIENT
Start: 2023-04-12 | End: 2023-04-16

## 2023-04-12 RX ORDER — OXYCODONE HYDROCHLORIDE AND ACETAMINOPHEN 5; 325 MG/1; MG/1
2 TABLET ORAL EVERY 8 HOURS PRN
Status: DISCONTINUED | OUTPATIENT
Start: 2023-04-12 | End: 2023-04-13

## 2023-04-12 RX ORDER — OXYCODONE HYDROCHLORIDE AND ACETAMINOPHEN 5; 325 MG/1; MG/1
1 TABLET ORAL EVERY 8 HOURS PRN
Status: DISCONTINUED | OUTPATIENT
Start: 2023-04-12 | End: 2023-04-18 | Stop reason: HOSPADM

## 2023-04-12 RX ORDER — QUETIAPINE FUMARATE 50 MG/1
50 TABLET, EXTENDED RELEASE ORAL NIGHTLY
Status: DISCONTINUED | OUTPATIENT
Start: 2023-04-12 | End: 2023-04-18 | Stop reason: HOSPADM

## 2023-04-12 RX ORDER — MIDODRINE HYDROCHLORIDE 10 MG/1
10 TABLET ORAL 3 TIMES DAILY PRN
Status: DISCONTINUED | OUTPATIENT
Start: 2023-04-12 | End: 2023-04-18 | Stop reason: HOSPADM

## 2023-04-12 RX ADMIN — ASPIRIN 81 MG: 81 TABLET, COATED ORAL at 11:29

## 2023-04-12 RX ADMIN — CLONAZEPAM 1 MG: 0.5 TABLET ORAL at 06:35

## 2023-04-12 RX ADMIN — ACETAMINOPHEN 650 MG: 325 TABLET ORAL at 20:46

## 2023-04-12 RX ADMIN — CLONAZEPAM 1 MG: 0.5 TABLET ORAL at 17:47

## 2023-04-12 RX ADMIN — LEVOTHYROXINE SODIUM 200 MCG: 0.2 TABLET ORAL at 05:58

## 2023-04-12 RX ADMIN — NYSTATIN: 100000 CREAM TOPICAL at 11:45

## 2023-04-12 RX ADMIN — ENOXAPARIN SODIUM 60 MG: 100 INJECTION SUBCUTANEOUS at 11:29

## 2023-04-12 RX ADMIN — OXYCODONE AND ACETAMINOPHEN 2 TABLET: 5; 325 TABLET ORAL at 22:28

## 2023-04-12 RX ADMIN — CEFTRIAXONE SODIUM 1000 MG: 1 INJECTION, POWDER, FOR SOLUTION INTRAMUSCULAR; INTRAVENOUS at 11:41

## 2023-04-12 RX ADMIN — ACETAMINOPHEN 650 MG: 325 TABLET ORAL at 01:41

## 2023-04-12 RX ADMIN — SODIUM CHLORIDE, PRESERVATIVE FREE 10 ML: 5 INJECTION INTRAVENOUS at 20:47

## 2023-04-12 RX ADMIN — INSULIN GLARGINE 50 UNITS: 100 INJECTION, SOLUTION SUBCUTANEOUS at 20:46

## 2023-04-12 RX ADMIN — TAMSULOSIN HYDROCHLORIDE 0.4 MG: 0.4 CAPSULE ORAL at 11:43

## 2023-04-12 RX ADMIN — ENOXAPARIN SODIUM 60 MG: 100 INJECTION SUBCUTANEOUS at 20:46

## 2023-04-12 ASSESSMENT — PAIN DESCRIPTION - DESCRIPTORS: DESCRIPTORS: ACHING

## 2023-04-12 ASSESSMENT — PAIN DESCRIPTION - ONSET: ONSET: ON-GOING

## 2023-04-12 ASSESSMENT — PAIN DESCRIPTION - PAIN TYPE: TYPE: CHRONIC PAIN

## 2023-04-12 ASSESSMENT — PAIN DESCRIPTION - FREQUENCY: FREQUENCY: INTERMITTENT

## 2023-04-12 ASSESSMENT — PAIN DESCRIPTION - LOCATION
LOCATION: NECK
LOCATION: GENERALIZED

## 2023-04-12 ASSESSMENT — PAIN SCALES - GENERAL
PAINLEVEL_OUTOF10: 3
PAINLEVEL_OUTOF10: 9

## 2023-04-12 ASSESSMENT — PAIN DESCRIPTION - ORIENTATION: ORIENTATION: POSTERIOR

## 2023-04-12 ASSESSMENT — PAIN - FUNCTIONAL ASSESSMENT: PAIN_FUNCTIONAL_ASSESSMENT: ACTIVITIES ARE NOT PREVENTED

## 2023-04-13 PROBLEM — I89.0 LYMPHEDEMA OF BOTH LOWER EXTREMITIES: Status: ACTIVE | Noted: 2023-04-13

## 2023-04-13 PROBLEM — I87.8 VENOUS STASIS: Status: ACTIVE | Noted: 2023-04-13

## 2023-04-13 LAB
ABSOLUTE EOS #: 0.11 K/UL (ref 0–0.44)
ABSOLUTE IMMATURE GRANULOCYTE: 0.46 K/UL (ref 0–0.3)
ABSOLUTE LYMPH #: 0.57 K/UL (ref 1.1–3.7)
ABSOLUTE MONO #: 1.14 K/UL (ref 0.1–1.2)
ACTION: NORMAL
AMMONIA PLAS-SCNC: 25 UMOL/L (ref 16–60)
ANION GAP SERPL CALCULATED.3IONS-SCNC: 8 MMOL/L (ref 9–17)
BASOPHILS # BLD: 0 % (ref 0–2)
BASOPHILS ABSOLUTE: 0 K/UL (ref 0–0.2)
BUN SERPL-MCNC: 16 MG/DL (ref 6–20)
BUN/CREAT BLD: 19 (ref 9–20)
CALCIUM SERPL-MCNC: 9.6 MG/DL (ref 8.6–10.4)
CHLORIDE SERPL-SCNC: 83 MMOL/L (ref 98–107)
CO2 SERPL-SCNC: 39 MMOL/L (ref 20–31)
CORTISOL: 21 UG/DL (ref 2.7–18.4)
CREAT SERPL-MCNC: 0.84 MG/DL (ref 0.7–1.2)
DATE AND TIME: NORMAL
EOSINOPHILS RELATIVE PERCENT: 1 % (ref 1–4)
FIO2: 36
GFR SERPL CREATININE-BSD FRML MDRD: >60 ML/MIN/1.73M2
GLUCOSE BLD-MCNC: 146 MG/DL (ref 75–110)
GLUCOSE BLD-MCNC: 152 MG/DL (ref 75–110)
GLUCOSE BLD-MCNC: 170 MG/DL (ref 75–110)
GLUCOSE BLD-MCNC: 178 MG/DL (ref 75–110)
GLUCOSE SERPL-MCNC: 189 MG/DL (ref 70–99)
HCO3 VENOUS: 46.5 MMOL/L (ref 22–29)
HCT VFR BLD AUTO: 27.4 % (ref 40.7–50.3)
HGB BLD-MCNC: 8.3 G/DL (ref 13–17)
IMMATURE GRANULOCYTES: 4 %
LACTIC ACID, SEPSIS: 1.3 MMOL/L (ref 0.5–1.9)
LYMPHOCYTES # BLD: 5 % (ref 24–43)
MCH RBC QN AUTO: 26 PG (ref 25.2–33.5)
MCHC RBC AUTO-ENTMCNC: 30.3 G/DL (ref 28.4–34.8)
MCV RBC AUTO: 85.9 FL (ref 82.6–102.9)
MONOCYTES # BLD: 10 % (ref 3–12)
NOTIFY: NORMAL
NRBC AUTOMATED: 0 PER 100 WBC
O2 DEVICE/FLOW/%: ABNORMAL
O2 SAT, VEN: 99 % (ref 60–85)
PATIENT TEMP: 37
PATIENT TEMP: 37
PCO2, VEN: 53.5 MM HG (ref 41–51)
PDW BLD-RTO: 13.7 % (ref 11.8–14.4)
PH VENOUS: 7.55 (ref 7.32–7.43)
PLATELET # BLD AUTO: 105 K/UL (ref 138–453)
PMV BLD AUTO: 9.7 FL (ref 8.1–13.5)
PO2, VEN: 129.8 MM HG (ref 30–50)
POC HCO3: 46.8 MMOL/L (ref 21–28)
POC HCO3: 49.9 MMOL/L (ref 21–28)
POC O2 SATURATION: 87 % (ref 94–98)
POC O2 SATURATION: 95 % (ref 94–98)
POC PCO2: 70.6 MM HG (ref 35–48)
POC PCO2: 73.8 MM HG (ref 35–48)
POC PH: 7.43 (ref 7.35–7.45)
POC PH: 7.44 (ref 7.35–7.45)
POC PO2: 55.3 MM HG (ref 83–108)
POC PO2: 77.4 MM HG (ref 83–108)
POC TCO2: 46 MMOL/L (ref 22–30)
POC TCO2: 49 MMOL/L (ref 22–30)
POSITIVE BASE EXCESS, ART: 19 (ref 0–3)
POSITIVE BASE EXCESS, ART: 22 (ref 0–3)
POSITIVE BASE EXCESS, VEN: 21 (ref 0–3)
POTASSIUM SERPL-SCNC: 4.1 MMOL/L (ref 3.7–5.3)
RBC # BLD: 3.19 M/UL (ref 4.21–5.77)
READ BACK: NO
READ BACK: YES
READ BACK: YES
SARS-COV-2 RDRP RESP QL NAA+PROBE: NOT DETECTED
SEG NEUTROPHILS: 80 % (ref 36–65)
SEGMENTED NEUTROPHILS ABSOLUTE COUNT: 9.12 K/UL (ref 1.5–8.1)
SODIUM SERPL-SCNC: 130 MMOL/L (ref 135–144)
SPECIMEN DESCRIPTION: NORMAL
TROPONIN I SERPL DL<=0.01 NG/ML-MCNC: 40 NG/L (ref 0–22)
TROPONIN I SERPL DL<=0.01 NG/ML-MCNC: 44 NG/L (ref 0–22)
TROPONIN I SERPL DL<=0.01 NG/ML-MCNC: 46 NG/L (ref 0–22)
WBC # BLD AUTO: 11.4 K/UL (ref 3.5–11.3)

## 2023-04-13 PROCEDURE — 82947 ASSAY GLUCOSE BLOOD QUANT: CPT

## 2023-04-13 PROCEDURE — 2580000003 HC RX 258: Performed by: INTERNAL MEDICINE

## 2023-04-13 PROCEDURE — 2580000003 HC RX 258: Performed by: STUDENT IN AN ORGANIZED HEALTH CARE EDUCATION/TRAINING PROGRAM

## 2023-04-13 PROCEDURE — 6370000000 HC RX 637 (ALT 250 FOR IP)

## 2023-04-13 PROCEDURE — 6360000002 HC RX W HCPCS: Performed by: NURSE PRACTITIONER

## 2023-04-13 PROCEDURE — 36415 COLL VENOUS BLD VENIPUNCTURE: CPT

## 2023-04-13 PROCEDURE — 2580000003 HC RX 258: Performed by: NURSE PRACTITIONER

## 2023-04-13 PROCEDURE — 6370000000 HC RX 637 (ALT 250 FOR IP): Performed by: NURSE PRACTITIONER

## 2023-04-13 PROCEDURE — 82140 ASSAY OF AMMONIA: CPT

## 2023-04-13 PROCEDURE — 6370000000 HC RX 637 (ALT 250 FOR IP): Performed by: FAMILY MEDICINE

## 2023-04-13 PROCEDURE — 94761 N-INVAS EAR/PLS OXIMETRY MLT: CPT

## 2023-04-13 PROCEDURE — 36600 WITHDRAWAL OF ARTERIAL BLOOD: CPT

## 2023-04-13 PROCEDURE — 99233 SBSQ HOSP IP/OBS HIGH 50: CPT | Performed by: STUDENT IN AN ORGANIZED HEALTH CARE EDUCATION/TRAINING PROGRAM

## 2023-04-13 PROCEDURE — 80048 BASIC METABOLIC PNL TOTAL CA: CPT

## 2023-04-13 PROCEDURE — 2000000000 HC ICU R&B

## 2023-04-13 PROCEDURE — 6370000000 HC RX 637 (ALT 250 FOR IP): Performed by: STUDENT IN AN ORGANIZED HEALTH CARE EDUCATION/TRAINING PROGRAM

## 2023-04-13 PROCEDURE — 82803 BLOOD GASES ANY COMBINATION: CPT

## 2023-04-13 PROCEDURE — 82533 TOTAL CORTISOL: CPT

## 2023-04-13 PROCEDURE — 6360000002 HC RX W HCPCS: Performed by: INTERNAL MEDICINE

## 2023-04-13 PROCEDURE — 2500000003 HC RX 250 WO HCPCS: Performed by: INTERNAL MEDICINE

## 2023-04-13 PROCEDURE — 2700000000 HC OXYGEN THERAPY PER DAY

## 2023-04-13 PROCEDURE — 99222 1ST HOSP IP/OBS MODERATE 55: CPT

## 2023-04-13 PROCEDURE — 6360000002 HC RX W HCPCS: Performed by: STUDENT IN AN ORGANIZED HEALTH CARE EDUCATION/TRAINING PROGRAM

## 2023-04-13 PROCEDURE — 6370000000 HC RX 637 (ALT 250 FOR IP): Performed by: INTERNAL MEDICINE

## 2023-04-13 PROCEDURE — 82374 ASSAY BLOOD CARBON DIOXIDE: CPT

## 2023-04-13 PROCEDURE — 83605 ASSAY OF LACTIC ACID: CPT

## 2023-04-13 PROCEDURE — 84484 ASSAY OF TROPONIN QUANT: CPT

## 2023-04-13 PROCEDURE — 87635 SARS-COV-2 COVID-19 AMP PRB: CPT

## 2023-04-13 PROCEDURE — 94660 CPAP INITIATION&MGMT: CPT

## 2023-04-13 PROCEDURE — 85025 COMPLETE CBC W/AUTO DIFF WBC: CPT

## 2023-04-13 RX ORDER — ACETAZOLAMIDE 500 MG/5ML
500 INJECTION, POWDER, LYOPHILIZED, FOR SOLUTION INTRAVENOUS EVERY 12 HOURS
Status: COMPLETED | OUTPATIENT
Start: 2023-04-13 | End: 2023-04-15

## 2023-04-13 RX ORDER — GUAIFENESIN 600 MG/1
600 TABLET, EXTENDED RELEASE ORAL 2 TIMES DAILY
Status: DISCONTINUED | OUTPATIENT
Start: 2023-04-13 | End: 2023-04-18 | Stop reason: HOSPADM

## 2023-04-13 RX ORDER — PANTOPRAZOLE SODIUM 40 MG/1
40 TABLET, DELAYED RELEASE ORAL
Status: DISCONTINUED | OUTPATIENT
Start: 2023-04-13 | End: 2023-04-18 | Stop reason: HOSPADM

## 2023-04-13 RX ADMIN — LEVOTHYROXINE SODIUM 200 MCG: 0.2 TABLET ORAL at 07:47

## 2023-04-13 RX ADMIN — ASPIRIN 81 MG: 81 TABLET, COATED ORAL at 07:47

## 2023-04-13 RX ADMIN — METOPROLOL TARTRATE 25 MG: 25 TABLET, FILM COATED ORAL at 15:20

## 2023-04-13 RX ADMIN — INSULIN GLARGINE 50 UNITS: 100 INJECTION, SOLUTION SUBCUTANEOUS at 21:37

## 2023-04-13 RX ADMIN — NYSTATIN: 100000 CREAM TOPICAL at 21:52

## 2023-04-13 RX ADMIN — SENNOSIDES 17.2 MG: 8.6 TABLET, COATED ORAL at 07:47

## 2023-04-13 RX ADMIN — SODIUM CHLORIDE: 9 INJECTION, SOLUTION INTRAVENOUS at 15:35

## 2023-04-13 RX ADMIN — CLONAZEPAM 1 MG: 0.5 TABLET ORAL at 21:36

## 2023-04-13 RX ADMIN — GUAIFENESIN 600 MG: 600 TABLET ORAL at 21:49

## 2023-04-13 RX ADMIN — OXYCODONE AND ACETAMINOPHEN 1 TABLET: 5; 325 TABLET ORAL at 18:14

## 2023-04-13 RX ADMIN — ENOXAPARIN SODIUM 60 MG: 100 INJECTION SUBCUTANEOUS at 21:36

## 2023-04-13 RX ADMIN — METOPROLOL TARTRATE 25 MG: 25 TABLET, FILM COATED ORAL at 21:36

## 2023-04-13 RX ADMIN — CEFEPIME 2000 MG: 2 INJECTION, POWDER, FOR SOLUTION INTRAVENOUS at 15:36

## 2023-04-13 RX ADMIN — QUETIAPINE FUMARATE 50 MG: 50 TABLET, EXTENDED RELEASE ORAL at 00:29

## 2023-04-13 RX ADMIN — CEFTRIAXONE SODIUM 1000 MG: 1 INJECTION, POWDER, FOR SOLUTION INTRAMUSCULAR; INTRAVENOUS at 07:50

## 2023-04-13 RX ADMIN — OXYCODONE AND ACETAMINOPHEN 2 TABLET: 5; 325 TABLET ORAL at 07:48

## 2023-04-13 RX ADMIN — TAMSULOSIN HYDROCHLORIDE 0.4 MG: 0.4 CAPSULE ORAL at 07:48

## 2023-04-13 RX ADMIN — QUETIAPINE FUMARATE 50 MG: 50 TABLET, EXTENDED RELEASE ORAL at 21:36

## 2023-04-13 RX ADMIN — SENNOSIDES 17.2 MG: 8.6 TABLET, COATED ORAL at 21:36

## 2023-04-13 RX ADMIN — DEXMEDETOMIDINE 0.2 MCG/KG/HR: 100 INJECTION, SOLUTION INTRAVENOUS at 21:35

## 2023-04-13 RX ADMIN — PANTOPRAZOLE SODIUM 40 MG: 40 TABLET, DELAYED RELEASE ORAL at 21:49

## 2023-04-13 RX ADMIN — INSULIN GLARGINE 50 UNITS: 100 INJECTION, SOLUTION SUBCUTANEOUS at 07:48

## 2023-04-13 RX ADMIN — ENOXAPARIN SODIUM 60 MG: 100 INJECTION SUBCUTANEOUS at 07:47

## 2023-04-13 RX ADMIN — ACETAZOLAMIDE 500 MG: 500 INJECTION, POWDER, LYOPHILIZED, FOR SOLUTION INTRAVENOUS at 15:22

## 2023-04-13 RX ADMIN — SODIUM CHLORIDE, PRESERVATIVE FREE 10 ML: 5 INJECTION INTRAVENOUS at 08:25

## 2023-04-13 RX ADMIN — NYSTATIN: 100000 CREAM TOPICAL at 07:57

## 2023-04-13 ASSESSMENT — PAIN DESCRIPTION - DESCRIPTORS
DESCRIPTORS: THROBBING

## 2023-04-13 ASSESSMENT — PAIN - FUNCTIONAL ASSESSMENT
PAIN_FUNCTIONAL_ASSESSMENT: PREVENTS OR INTERFERES WITH MANY ACTIVE NOT PASSIVE ACTIVITIES

## 2023-04-13 ASSESSMENT — PAIN DESCRIPTION - LOCATION
LOCATION: LEG;HEAD;ABDOMEN
LOCATION: LEG;HEAD
LOCATION: HEAD;LEG;ABDOMEN

## 2023-04-13 ASSESSMENT — PAIN DESCRIPTION - FREQUENCY
FREQUENCY: INTERMITTENT

## 2023-04-13 ASSESSMENT — PAIN DESCRIPTION - PAIN TYPE
TYPE: CHRONIC PAIN

## 2023-04-13 ASSESSMENT — PAIN SCALES - GENERAL
PAINLEVEL_OUTOF10: 7
PAINLEVEL_OUTOF10: 0
PAINLEVEL_OUTOF10: 7
PAINLEVEL_OUTOF10: 0
PAINLEVEL_OUTOF10: 7
PAINLEVEL_OUTOF10: 0
PAINLEVEL_OUTOF10: 7

## 2023-04-13 ASSESSMENT — PAIN DESCRIPTION - ORIENTATION
ORIENTATION: RIGHT;LEFT;ANTERIOR

## 2023-04-13 ASSESSMENT — PAIN DESCRIPTION - ONSET
ONSET: ON-GOING

## 2023-04-14 ENCOUNTER — APPOINTMENT (OUTPATIENT)
Dept: GENERAL RADIOLOGY | Age: 58
DRG: 640 | End: 2023-04-14
Payer: MEDICARE

## 2023-04-14 LAB
ABSOLUTE EOS #: 0.31 K/UL (ref 0–0.44)
ABSOLUTE IMMATURE GRANULOCYTE: 0.72 K/UL (ref 0–0.3)
ABSOLUTE LYMPH #: 0.72 K/UL (ref 1.1–3.7)
ABSOLUTE MONO #: 0.93 K/UL (ref 0.1–1.2)
ALBUMIN SERPL-MCNC: 3.2 G/DL (ref 3.5–5.2)
ALP SERPL-CCNC: 291 U/L (ref 40–129)
ALT SERPL-CCNC: 30 U/L (ref 5–41)
ANION GAP SERPL CALCULATED.3IONS-SCNC: 7 MMOL/L (ref 9–17)
AST SERPL-CCNC: 26 U/L
BASOPHILS # BLD: 1 % (ref 0–2)
BASOPHILS ABSOLUTE: 0.1 K/UL (ref 0–0.2)
BILIRUB SERPL-MCNC: 0.7 MG/DL (ref 0.3–1.2)
BUN SERPL-MCNC: 17 MG/DL (ref 6–20)
BUN/CREAT BLD: 18 (ref 9–20)
CALCIUM SERPL-MCNC: 9.5 MG/DL (ref 8.6–10.4)
CHLORIDE SERPL-SCNC: 84 MMOL/L (ref 98–107)
CO2 SERPL-SCNC: 42 MMOL/L (ref 20–31)
CREAT SERPL-MCNC: 0.92 MG/DL (ref 0.7–1.2)
EOSINOPHILS RELATIVE PERCENT: 3 % (ref 1–4)
GFR SERPL CREATININE-BSD FRML MDRD: >60 ML/MIN/1.73M2
GLUCOSE BLD-MCNC: 167 MG/DL (ref 75–110)
GLUCOSE BLD-MCNC: 168 MG/DL (ref 75–110)
GLUCOSE BLD-MCNC: 227 MG/DL (ref 75–110)
GLUCOSE BLD-MCNC: 90 MG/DL (ref 75–110)
GLUCOSE SERPL-MCNC: 99 MG/DL (ref 70–99)
HCT VFR BLD AUTO: 30.4 % (ref 40.7–50.3)
HGB BLD-MCNC: 8.9 G/DL (ref 13–17)
IMMATURE GRANULOCYTES: 7 %
LYMPHOCYTES # BLD: 7 % (ref 24–43)
MAGNESIUM SERPL-MCNC: 2.2 MG/DL (ref 1.6–2.6)
MCH RBC QN AUTO: 26.1 PG (ref 25.2–33.5)
MCHC RBC AUTO-ENTMCNC: 29.3 G/DL (ref 28.4–34.8)
MCV RBC AUTO: 89.1 FL (ref 82.6–102.9)
MICROORGANISM SPEC CULT: ABNORMAL
MONOCYTES # BLD: 9 % (ref 3–12)
NRBC AUTOMATED: 0.2 PER 100 WBC
PDW BLD-RTO: 13.9 % (ref 11.8–14.4)
PLATELET # BLD AUTO: 125 K/UL (ref 138–453)
PMV BLD AUTO: 9.5 FL (ref 8.1–13.5)
POTASSIUM SERPL-SCNC: 3.4 MMOL/L (ref 3.7–5.3)
PROT SERPL-MCNC: 7.3 G/DL (ref 6.4–8.3)
RBC # BLD: 3.41 M/UL (ref 4.21–5.77)
SEG NEUTROPHILS: 73 % (ref 36–65)
SEGMENTED NEUTROPHILS ABSOLUTE COUNT: 7.52 K/UL (ref 1.5–8.1)
SODIUM SERPL-SCNC: 133 MMOL/L (ref 135–144)
SPECIMEN DESCRIPTION: ABNORMAL
WBC # BLD AUTO: 10.3 K/UL (ref 3.5–11.3)

## 2023-04-14 PROCEDURE — 94761 N-INVAS EAR/PLS OXIMETRY MLT: CPT

## 2023-04-14 PROCEDURE — 85025 COMPLETE CBC W/AUTO DIFF WBC: CPT

## 2023-04-14 PROCEDURE — 6360000002 HC RX W HCPCS: Performed by: STUDENT IN AN ORGANIZED HEALTH CARE EDUCATION/TRAINING PROGRAM

## 2023-04-14 PROCEDURE — 2580000003 HC RX 258: Performed by: STUDENT IN AN ORGANIZED HEALTH CARE EDUCATION/TRAINING PROGRAM

## 2023-04-14 PROCEDURE — 6360000002 HC RX W HCPCS: Performed by: NURSE PRACTITIONER

## 2023-04-14 PROCEDURE — 6370000000 HC RX 637 (ALT 250 FOR IP): Performed by: FAMILY MEDICINE

## 2023-04-14 PROCEDURE — 83735 ASSAY OF MAGNESIUM: CPT

## 2023-04-14 PROCEDURE — 6370000000 HC RX 637 (ALT 250 FOR IP): Performed by: INTERNAL MEDICINE

## 2023-04-14 PROCEDURE — 94660 CPAP INITIATION&MGMT: CPT

## 2023-04-14 PROCEDURE — 36415 COLL VENOUS BLD VENIPUNCTURE: CPT

## 2023-04-14 PROCEDURE — 97110 THERAPEUTIC EXERCISES: CPT

## 2023-04-14 PROCEDURE — 6370000000 HC RX 637 (ALT 250 FOR IP): Performed by: NURSE PRACTITIONER

## 2023-04-14 PROCEDURE — 94640 AIRWAY INHALATION TREATMENT: CPT

## 2023-04-14 PROCEDURE — 6370000000 HC RX 637 (ALT 250 FOR IP)

## 2023-04-14 PROCEDURE — 93005 ELECTROCARDIOGRAM TRACING: CPT | Performed by: STUDENT IN AN ORGANIZED HEALTH CARE EDUCATION/TRAINING PROGRAM

## 2023-04-14 PROCEDURE — 2700000000 HC OXYGEN THERAPY PER DAY

## 2023-04-14 PROCEDURE — 2000000000 HC ICU R&B

## 2023-04-14 PROCEDURE — 97530 THERAPEUTIC ACTIVITIES: CPT

## 2023-04-14 PROCEDURE — 6370000000 HC RX 637 (ALT 250 FOR IP): Performed by: STUDENT IN AN ORGANIZED HEALTH CARE EDUCATION/TRAINING PROGRAM

## 2023-04-14 PROCEDURE — 99232 SBSQ HOSP IP/OBS MODERATE 35: CPT | Performed by: STUDENT IN AN ORGANIZED HEALTH CARE EDUCATION/TRAINING PROGRAM

## 2023-04-14 PROCEDURE — 82947 ASSAY GLUCOSE BLOOD QUANT: CPT

## 2023-04-14 PROCEDURE — 2500000003 HC RX 250 WO HCPCS: Performed by: INTERNAL MEDICINE

## 2023-04-14 PROCEDURE — 71045 X-RAY EXAM CHEST 1 VIEW: CPT

## 2023-04-14 PROCEDURE — 2580000003 HC RX 258: Performed by: INTERNAL MEDICINE

## 2023-04-14 PROCEDURE — 80053 COMPREHEN METABOLIC PANEL: CPT

## 2023-04-14 PROCEDURE — 6360000002 HC RX W HCPCS: Performed by: INTERNAL MEDICINE

## 2023-04-14 RX ORDER — IPRATROPIUM BROMIDE AND ALBUTEROL SULFATE 2.5; .5 MG/3ML; MG/3ML
1 SOLUTION RESPIRATORY (INHALATION)
Status: DISCONTINUED | OUTPATIENT
Start: 2023-04-14 | End: 2023-04-14

## 2023-04-14 RX ORDER — AMIODARONE HYDROCHLORIDE 200 MG/1
400 TABLET ORAL 2 TIMES DAILY
Status: DISCONTINUED | OUTPATIENT
Start: 2023-04-14 | End: 2023-04-17

## 2023-04-14 RX ORDER — IPRATROPIUM BROMIDE AND ALBUTEROL SULFATE 2.5; .5 MG/3ML; MG/3ML
1 SOLUTION RESPIRATORY (INHALATION) 3 TIMES DAILY
Status: DISCONTINUED | OUTPATIENT
Start: 2023-04-14 | End: 2023-04-18 | Stop reason: HOSPADM

## 2023-04-14 RX ADMIN — ENOXAPARIN SODIUM 180 MG: 100 INJECTION SUBCUTANEOUS at 21:00

## 2023-04-14 RX ADMIN — CEFEPIME 2000 MG: 2 INJECTION, POWDER, FOR SOLUTION INTRAVENOUS at 19:44

## 2023-04-14 RX ADMIN — PANTOPRAZOLE SODIUM 40 MG: 40 TABLET, DELAYED RELEASE ORAL at 11:13

## 2023-04-14 RX ADMIN — ASPIRIN 81 MG: 81 TABLET, COATED ORAL at 11:12

## 2023-04-14 RX ADMIN — POTASSIUM BICARBONATE 40 MEQ: 782 TABLET, EFFERVESCENT ORAL at 11:13

## 2023-04-14 RX ADMIN — DEXMEDETOMIDINE 0.6 MCG/KG/HR: 100 INJECTION, SOLUTION INTRAVENOUS at 01:07

## 2023-04-14 RX ADMIN — QUETIAPINE FUMARATE 50 MG: 50 TABLET, EXTENDED RELEASE ORAL at 20:58

## 2023-04-14 RX ADMIN — GUAIFENESIN 600 MG: 600 TABLET ORAL at 11:12

## 2023-04-14 RX ADMIN — NYSTATIN: 100000 CREAM TOPICAL at 11:27

## 2023-04-14 RX ADMIN — SENNOSIDES 17.2 MG: 8.6 TABLET, COATED ORAL at 20:59

## 2023-04-14 RX ADMIN — ACETAZOLAMIDE 500 MG: 500 INJECTION, POWDER, LYOPHILIZED, FOR SOLUTION INTRAVENOUS at 19:33

## 2023-04-14 RX ADMIN — CEFEPIME 2000 MG: 2 INJECTION, POWDER, FOR SOLUTION INTRAVENOUS at 11:11

## 2023-04-14 RX ADMIN — ENOXAPARIN SODIUM 60 MG: 100 INJECTION SUBCUTANEOUS at 11:27

## 2023-04-14 RX ADMIN — METOPROLOL TARTRATE 25 MG: 25 TABLET, FILM COATED ORAL at 20:59

## 2023-04-14 RX ADMIN — IPRATROPIUM BROMIDE AND ALBUTEROL SULFATE 1 AMPULE: 2.5; .5 SOLUTION RESPIRATORY (INHALATION) at 20:09

## 2023-04-14 RX ADMIN — TAMSULOSIN HYDROCHLORIDE 0.4 MG: 0.4 CAPSULE ORAL at 11:13

## 2023-04-14 RX ADMIN — SENNOSIDES 17.2 MG: 8.6 TABLET, COATED ORAL at 11:12

## 2023-04-14 RX ADMIN — INSULIN GLARGINE 50 UNITS: 100 INJECTION, SOLUTION SUBCUTANEOUS at 21:00

## 2023-04-14 RX ADMIN — DEXMEDETOMIDINE 0.3 MCG/KG/HR: 100 INJECTION, SOLUTION INTRAVENOUS at 12:56

## 2023-04-14 RX ADMIN — CEFEPIME 2000 MG: 2 INJECTION, POWDER, FOR SOLUTION INTRAVENOUS at 00:07

## 2023-04-14 RX ADMIN — METOPROLOL TARTRATE 25 MG: 25 TABLET, FILM COATED ORAL at 11:12

## 2023-04-14 RX ADMIN — NYSTATIN: 100000 CREAM TOPICAL at 21:00

## 2023-04-14 RX ADMIN — INSULIN GLARGINE 50 UNITS: 100 INJECTION, SOLUTION SUBCUTANEOUS at 10:57

## 2023-04-14 RX ADMIN — ACETAZOLAMIDE 500 MG: 500 INJECTION, POWDER, LYOPHILIZED, FOR SOLUTION INTRAVENOUS at 02:52

## 2023-04-14 RX ADMIN — IPRATROPIUM BROMIDE AND ALBUTEROL SULFATE 1 AMPULE: 2.5; .5 SOLUTION RESPIRATORY (INHALATION) at 14:29

## 2023-04-14 RX ADMIN — LEVOTHYROXINE SODIUM 200 MCG: 0.2 TABLET ORAL at 11:12

## 2023-04-14 RX ADMIN — DEXMEDETOMIDINE 0.4 MCG/KG/HR: 100 INJECTION, SOLUTION INTRAVENOUS at 05:14

## 2023-04-14 RX ADMIN — DEXMEDETOMIDINE 0.4 MCG/KG/HR: 100 INJECTION, SOLUTION INTRAVENOUS at 19:39

## 2023-04-14 RX ADMIN — GUAIFENESIN 600 MG: 600 TABLET ORAL at 20:59

## 2023-04-15 LAB
ABSOLUTE EOS #: 0.37 K/UL (ref 0–0.44)
ABSOLUTE IMMATURE GRANULOCYTE: 0.47 K/UL (ref 0–0.3)
ABSOLUTE LYMPH #: 0.74 K/UL (ref 1.1–3.7)
ABSOLUTE MONO #: 0.74 K/UL (ref 0.1–1.2)
ANION GAP SERPL CALCULATED.3IONS-SCNC: 6 MMOL/L (ref 9–17)
BASOPHILS # BLD: 0 % (ref 0–2)
BASOPHILS ABSOLUTE: 0 K/UL (ref 0–0.2)
BUN SERPL-MCNC: 19 MG/DL (ref 6–20)
BUN/CREAT BLD: 20 (ref 9–20)
CALCIUM SERPL-MCNC: 9.6 MG/DL (ref 8.6–10.4)
CHLORIDE SERPL-SCNC: 91 MMOL/L (ref 98–107)
CO2 SERPL-SCNC: 36 MMOL/L (ref 20–31)
CREAT SERPL-MCNC: 0.95 MG/DL (ref 0.7–1.2)
EOSINOPHILS RELATIVE PERCENT: 4 % (ref 1–4)
GFR SERPL CREATININE-BSD FRML MDRD: >60 ML/MIN/1.73M2
GLUCOSE BLD-MCNC: 162 MG/DL (ref 75–110)
GLUCOSE BLD-MCNC: 177 MG/DL (ref 75–110)
GLUCOSE BLD-MCNC: 194 MG/DL (ref 75–110)
GLUCOSE BLD-MCNC: 89 MG/DL (ref 75–110)
GLUCOSE SERPL-MCNC: 87 MG/DL (ref 70–99)
HCT VFR BLD AUTO: 32.6 % (ref 40.7–50.3)
HGB BLD-MCNC: 9.1 G/DL (ref 13–17)
IMMATURE GRANULOCYTES: 5 %
LYMPHOCYTES # BLD: 8 % (ref 24–43)
MCH RBC QN AUTO: 26.3 PG (ref 25.2–33.5)
MCHC RBC AUTO-ENTMCNC: 27.9 G/DL (ref 28–38)
MCV RBC AUTO: 94.2 FL (ref 82.6–102.9)
MONOCYTES # BLD: 8 % (ref 3–12)
MORPHOLOGY: ABNORMAL
PDW BLD-RTO: 14 % (ref 11.8–14.4)
PLATELET # BLD AUTO: 131 K/UL (ref 138–453)
PMV BLD AUTO: 9.7 FL (ref 8.1–13.5)
POTASSIUM SERPL-SCNC: 3.4 MMOL/L (ref 3.7–5.3)
RBC # BLD: 3.46 M/UL (ref 4.21–5.77)
SEG NEUTROPHILS: 75 % (ref 36–65)
SEGMENTED NEUTROPHILS ABSOLUTE COUNT: 6.98 K/UL (ref 1.5–8.1)
SODIUM SERPL-SCNC: 133 MMOL/L (ref 135–144)
WBC # BLD AUTO: 9.3 K/UL (ref 3.5–11.3)

## 2023-04-15 PROCEDURE — 6370000000 HC RX 637 (ALT 250 FOR IP): Performed by: FAMILY MEDICINE

## 2023-04-15 PROCEDURE — 6370000000 HC RX 637 (ALT 250 FOR IP): Performed by: INTERNAL MEDICINE

## 2023-04-15 PROCEDURE — 94761 N-INVAS EAR/PLS OXIMETRY MLT: CPT

## 2023-04-15 PROCEDURE — 94640 AIRWAY INHALATION TREATMENT: CPT

## 2023-04-15 PROCEDURE — 6370000000 HC RX 637 (ALT 250 FOR IP): Performed by: STUDENT IN AN ORGANIZED HEALTH CARE EDUCATION/TRAINING PROGRAM

## 2023-04-15 PROCEDURE — 2500000003 HC RX 250 WO HCPCS: Performed by: INTERNAL MEDICINE

## 2023-04-15 PROCEDURE — 2580000003 HC RX 258: Performed by: INTERNAL MEDICINE

## 2023-04-15 PROCEDURE — 6360000002 HC RX W HCPCS: Performed by: STUDENT IN AN ORGANIZED HEALTH CARE EDUCATION/TRAINING PROGRAM

## 2023-04-15 PROCEDURE — 99232 SBSQ HOSP IP/OBS MODERATE 35: CPT | Performed by: NURSE PRACTITIONER

## 2023-04-15 PROCEDURE — 6370000000 HC RX 637 (ALT 250 FOR IP): Performed by: NURSE PRACTITIONER

## 2023-04-15 PROCEDURE — 6370000000 HC RX 637 (ALT 250 FOR IP)

## 2023-04-15 PROCEDURE — 94660 CPAP INITIATION&MGMT: CPT

## 2023-04-15 PROCEDURE — 2580000003 HC RX 258: Performed by: STUDENT IN AN ORGANIZED HEALTH CARE EDUCATION/TRAINING PROGRAM

## 2023-04-15 PROCEDURE — 2700000000 HC OXYGEN THERAPY PER DAY

## 2023-04-15 PROCEDURE — 80048 BASIC METABOLIC PNL TOTAL CA: CPT

## 2023-04-15 PROCEDURE — 6360000002 HC RX W HCPCS: Performed by: INTERNAL MEDICINE

## 2023-04-15 PROCEDURE — 2000000000 HC ICU R&B

## 2023-04-15 PROCEDURE — 85025 COMPLETE CBC W/AUTO DIFF WBC: CPT

## 2023-04-15 PROCEDURE — 2500000003 HC RX 250 WO HCPCS: Performed by: NURSE PRACTITIONER

## 2023-04-15 PROCEDURE — 82947 ASSAY GLUCOSE BLOOD QUANT: CPT

## 2023-04-15 PROCEDURE — 36415 COLL VENOUS BLD VENIPUNCTURE: CPT

## 2023-04-15 RX ORDER — BUMETANIDE 0.25 MG/ML
2 INJECTION INTRAMUSCULAR; INTRAVENOUS 2 TIMES DAILY
Status: DISCONTINUED | OUTPATIENT
Start: 2023-04-15 | End: 2023-04-17

## 2023-04-15 RX ORDER — METOPROLOL TARTRATE 5 MG/5ML
5 INJECTION INTRAVENOUS EVERY 6 HOURS PRN
Status: DISCONTINUED | OUTPATIENT
Start: 2023-04-15 | End: 2023-04-18 | Stop reason: HOSPADM

## 2023-04-15 RX ADMIN — ENOXAPARIN SODIUM 180 MG: 100 INJECTION SUBCUTANEOUS at 19:51

## 2023-04-15 RX ADMIN — CEFEPIME 2000 MG: 2 INJECTION, POWDER, FOR SOLUTION INTRAVENOUS at 23:31

## 2023-04-15 RX ADMIN — BUMETANIDE 2 MG: 0.25 INJECTION, SOLUTION INTRAMUSCULAR; INTRAVENOUS at 19:50

## 2023-04-15 RX ADMIN — CLONAZEPAM 1 MG: 0.5 TABLET ORAL at 09:23

## 2023-04-15 RX ADMIN — IPRATROPIUM BROMIDE AND ALBUTEROL SULFATE 1 AMPULE: 2.5; .5 SOLUTION RESPIRATORY (INHALATION) at 07:31

## 2023-04-15 RX ADMIN — TAMSULOSIN HYDROCHLORIDE 0.4 MG: 0.4 CAPSULE ORAL at 08:49

## 2023-04-15 RX ADMIN — DEXMEDETOMIDINE 0.4 MCG/KG/HR: 100 INJECTION, SOLUTION INTRAVENOUS at 19:53

## 2023-04-15 RX ADMIN — DEXMEDETOMIDINE 0.4 MCG/KG/HR: 100 INJECTION, SOLUTION INTRAVENOUS at 14:50

## 2023-04-15 RX ADMIN — AMIODARONE HYDROCHLORIDE 400 MG: 200 TABLET ORAL at 08:59

## 2023-04-15 RX ADMIN — CEFEPIME 2000 MG: 2 INJECTION, POWDER, FOR SOLUTION INTRAVENOUS at 16:25

## 2023-04-15 RX ADMIN — METOPROLOL TARTRATE 5 MG: 5 INJECTION INTRAVENOUS at 21:16

## 2023-04-15 RX ADMIN — NYSTATIN: 100000 CREAM TOPICAL at 08:50

## 2023-04-15 RX ADMIN — ASPIRIN 81 MG: 81 TABLET, COATED ORAL at 08:49

## 2023-04-15 RX ADMIN — IPRATROPIUM BROMIDE AND ALBUTEROL SULFATE 1 AMPULE: 2.5; .5 SOLUTION RESPIRATORY (INHALATION) at 14:10

## 2023-04-15 RX ADMIN — METOPROLOL TARTRATE 25 MG: 25 TABLET, FILM COATED ORAL at 08:49

## 2023-04-15 RX ADMIN — SENNOSIDES 17.2 MG: 8.6 TABLET, COATED ORAL at 08:49

## 2023-04-15 RX ADMIN — IPRATROPIUM BROMIDE AND ALBUTEROL SULFATE 1 AMPULE: 2.5; .5 SOLUTION RESPIRATORY (INHALATION) at 19:48

## 2023-04-15 RX ADMIN — ACETAZOLAMIDE 500 MG: 500 INJECTION, POWDER, LYOPHILIZED, FOR SOLUTION INTRAVENOUS at 03:44

## 2023-04-15 RX ADMIN — ENOXAPARIN SODIUM 180 MG: 100 INJECTION SUBCUTANEOUS at 08:50

## 2023-04-15 RX ADMIN — BUMETANIDE 2 MG: 0.25 INJECTION, SOLUTION INTRAMUSCULAR; INTRAVENOUS at 11:04

## 2023-04-15 RX ADMIN — INSULIN GLARGINE 50 UNITS: 100 INJECTION, SOLUTION SUBCUTANEOUS at 20:33

## 2023-04-15 RX ADMIN — CEFEPIME 2000 MG: 2 INJECTION, POWDER, FOR SOLUTION INTRAVENOUS at 08:51

## 2023-04-15 RX ADMIN — GUAIFENESIN 600 MG: 600 TABLET ORAL at 08:49

## 2023-04-15 RX ADMIN — CLONAZEPAM 1 MG: 0.5 TABLET ORAL at 19:51

## 2023-04-15 RX ADMIN — DEXMEDETOMIDINE 0.2 MCG/KG/HR: 100 INJECTION, SOLUTION INTRAVENOUS at 07:23

## 2023-04-15 RX ADMIN — AMIODARONE HYDROCHLORIDE 400 MG: 200 TABLET ORAL at 00:14

## 2023-04-15 RX ADMIN — DEXMEDETOMIDINE 0.4 MCG/KG/HR: 100 INJECTION, SOLUTION INTRAVENOUS at 00:57

## 2023-04-15 RX ADMIN — NYSTATIN: 100000 CREAM TOPICAL at 19:51

## 2023-04-15 RX ADMIN — LEVOTHYROXINE SODIUM 200 MCG: 0.2 TABLET ORAL at 05:29

## 2023-04-15 RX ADMIN — GUAIFENESIN 600 MG: 600 TABLET ORAL at 19:51

## 2023-04-15 RX ADMIN — QUETIAPINE FUMARATE 50 MG: 50 TABLET, EXTENDED RELEASE ORAL at 19:51

## 2023-04-15 RX ADMIN — CEFEPIME 2000 MG: 2 INJECTION, POWDER, FOR SOLUTION INTRAVENOUS at 00:13

## 2023-04-15 RX ADMIN — POTASSIUM CHLORIDE 40 MEQ: 1500 TABLET, EXTENDED RELEASE ORAL at 06:57

## 2023-04-15 RX ADMIN — AMIODARONE HYDROCHLORIDE 400 MG: 200 TABLET ORAL at 19:51

## 2023-04-15 RX ADMIN — METOPROLOL TARTRATE 25 MG: 25 TABLET, FILM COATED ORAL at 19:51

## 2023-04-15 RX ADMIN — SENNOSIDES 17.2 MG: 8.6 TABLET, COATED ORAL at 19:51

## 2023-04-15 RX ADMIN — PANTOPRAZOLE SODIUM 40 MG: 40 TABLET, DELAYED RELEASE ORAL at 05:29

## 2023-04-15 ASSESSMENT — PAIN SCALES - GENERAL: PAINLEVEL_OUTOF10: 0

## 2023-04-16 LAB
ABSOLUTE EOS #: 0.34 K/UL (ref 0–0.44)
ABSOLUTE IMMATURE GRANULOCYTE: 0.33 K/UL (ref 0–0.3)
ABSOLUTE LYMPH #: 0.79 K/UL (ref 1.1–3.7)
ABSOLUTE MONO #: 0.61 K/UL (ref 0.1–1.2)
ANION GAP SERPL CALCULATED.3IONS-SCNC: 7 MMOL/L (ref 9–17)
BASOPHILS # BLD: 1 % (ref 0–2)
BASOPHILS ABSOLUTE: 0.05 K/UL (ref 0–0.2)
BUN SERPL-MCNC: 20 MG/DL (ref 6–20)
BUN/CREAT BLD: 18 (ref 9–20)
CALCIUM SERPL-MCNC: 9 MG/DL (ref 8.6–10.4)
CHLORIDE SERPL-SCNC: 91 MMOL/L (ref 98–107)
CO2 SERPL-SCNC: 35 MMOL/L (ref 20–31)
CREAT SERPL-MCNC: 1.12 MG/DL (ref 0.7–1.2)
EKG Q-T INTERVAL: 424 MS
EKG QRS DURATION: 148 MS
EKG QTC CALCULATION (BAZETT): 477 MS
EKG R AXIS: -36 DEGREES
EKG T AXIS: 45 DEGREES
EKG VENTRICULAR RATE: 76 BPM
EOSINOPHILS RELATIVE PERCENT: 5 % (ref 1–4)
GFR SERPL CREATININE-BSD FRML MDRD: >60 ML/MIN/1.73M2
GLUCOSE BLD-MCNC: 103 MG/DL (ref 75–110)
GLUCOSE BLD-MCNC: 153 MG/DL (ref 75–110)
GLUCOSE BLD-MCNC: 154 MG/DL (ref 75–110)
GLUCOSE BLD-MCNC: 188 MG/DL (ref 75–110)
GLUCOSE SERPL-MCNC: 115 MG/DL (ref 70–99)
HCT VFR BLD AUTO: 27.9 % (ref 40.7–50.3)
HGB BLD-MCNC: 8.3 G/DL (ref 13–17)
IMMATURE GRANULOCYTES: 5 %
LYMPHOCYTES # BLD: 11 % (ref 24–43)
MCH RBC QN AUTO: 26 PG (ref 25.2–33.5)
MCHC RBC AUTO-ENTMCNC: 29.7 G/DL (ref 28.4–34.8)
MCV RBC AUTO: 87.5 FL (ref 82.6–102.9)
MONOCYTES # BLD: 8 % (ref 3–12)
NRBC AUTOMATED: 0 PER 100 WBC
PDW BLD-RTO: 13.9 % (ref 11.8–14.4)
PLATELET # BLD AUTO: 119 K/UL (ref 138–453)
PMV BLD AUTO: 9.4 FL (ref 8.1–13.5)
POTASSIUM SERPL-SCNC: 3.6 MMOL/L (ref 3.7–5.3)
RBC # BLD: 3.19 M/UL (ref 4.21–5.77)
SEG NEUTROPHILS: 71 % (ref 36–65)
SEGMENTED NEUTROPHILS ABSOLUTE COUNT: 5.22 K/UL (ref 1.5–8.1)
SODIUM SERPL-SCNC: 133 MMOL/L (ref 135–144)
WBC # BLD AUTO: 7.3 K/UL (ref 3.5–11.3)

## 2023-04-16 PROCEDURE — 94761 N-INVAS EAR/PLS OXIMETRY MLT: CPT

## 2023-04-16 PROCEDURE — 2000000000 HC ICU R&B

## 2023-04-16 PROCEDURE — 2500000003 HC RX 250 WO HCPCS: Performed by: NURSE PRACTITIONER

## 2023-04-16 PROCEDURE — 6370000000 HC RX 637 (ALT 250 FOR IP)

## 2023-04-16 PROCEDURE — 6360000002 HC RX W HCPCS: Performed by: STUDENT IN AN ORGANIZED HEALTH CARE EDUCATION/TRAINING PROGRAM

## 2023-04-16 PROCEDURE — 6370000000 HC RX 637 (ALT 250 FOR IP): Performed by: STUDENT IN AN ORGANIZED HEALTH CARE EDUCATION/TRAINING PROGRAM

## 2023-04-16 PROCEDURE — 6370000000 HC RX 637 (ALT 250 FOR IP): Performed by: NURSE PRACTITIONER

## 2023-04-16 PROCEDURE — 6370000000 HC RX 637 (ALT 250 FOR IP): Performed by: INTERNAL MEDICINE

## 2023-04-16 PROCEDURE — 2500000003 HC RX 250 WO HCPCS: Performed by: INTERNAL MEDICINE

## 2023-04-16 PROCEDURE — 2580000003 HC RX 258: Performed by: INTERNAL MEDICINE

## 2023-04-16 PROCEDURE — 2580000003 HC RX 258: Performed by: NURSE PRACTITIONER

## 2023-04-16 PROCEDURE — 82947 ASSAY GLUCOSE BLOOD QUANT: CPT

## 2023-04-16 PROCEDURE — 85025 COMPLETE CBC W/AUTO DIFF WBC: CPT

## 2023-04-16 PROCEDURE — 93010 ELECTROCARDIOGRAM REPORT: CPT | Performed by: INTERNAL MEDICINE

## 2023-04-16 PROCEDURE — 80048 BASIC METABOLIC PNL TOTAL CA: CPT

## 2023-04-16 PROCEDURE — 94640 AIRWAY INHALATION TREATMENT: CPT

## 2023-04-16 PROCEDURE — 36415 COLL VENOUS BLD VENIPUNCTURE: CPT

## 2023-04-16 PROCEDURE — 94660 CPAP INITIATION&MGMT: CPT

## 2023-04-16 PROCEDURE — 99233 SBSQ HOSP IP/OBS HIGH 50: CPT | Performed by: NURSE PRACTITIONER

## 2023-04-16 PROCEDURE — 2700000000 HC OXYGEN THERAPY PER DAY

## 2023-04-16 PROCEDURE — 2580000003 HC RX 258: Performed by: STUDENT IN AN ORGANIZED HEALTH CARE EDUCATION/TRAINING PROGRAM

## 2023-04-16 PROCEDURE — 6370000000 HC RX 637 (ALT 250 FOR IP): Performed by: FAMILY MEDICINE

## 2023-04-16 RX ORDER — LANOLIN ALCOHOL/MO/W.PET/CERES
3 CREAM (GRAM) TOPICAL NIGHTLY PRN
Status: DISCONTINUED | OUTPATIENT
Start: 2023-04-16 | End: 2023-04-18 | Stop reason: HOSPADM

## 2023-04-16 RX ORDER — CLONAZEPAM 0.5 MG/1
1 TABLET ORAL 3 TIMES DAILY PRN
Status: DISCONTINUED | OUTPATIENT
Start: 2023-04-16 | End: 2023-04-18 | Stop reason: HOSPADM

## 2023-04-16 RX ADMIN — INSULIN GLARGINE 50 UNITS: 100 INJECTION, SOLUTION SUBCUTANEOUS at 20:04

## 2023-04-16 RX ADMIN — AMIODARONE HYDROCHLORIDE 400 MG: 200 TABLET ORAL at 19:58

## 2023-04-16 RX ADMIN — BUMETANIDE 2 MG: 0.25 INJECTION, SOLUTION INTRAMUSCULAR; INTRAVENOUS at 08:12

## 2023-04-16 RX ADMIN — CEFEPIME 2000 MG: 2 INJECTION, POWDER, FOR SOLUTION INTRAVENOUS at 15:27

## 2023-04-16 RX ADMIN — METOPROLOL TARTRATE 5 MG: 5 INJECTION INTRAVENOUS at 18:21

## 2023-04-16 RX ADMIN — OXYCODONE AND ACETAMINOPHEN 1 TABLET: 5; 325 TABLET ORAL at 13:21

## 2023-04-16 RX ADMIN — CLONAZEPAM 1 MG: 0.5 TABLET ORAL at 10:03

## 2023-04-16 RX ADMIN — LEVOTHYROXINE SODIUM 200 MCG: 0.2 TABLET ORAL at 05:39

## 2023-04-16 RX ADMIN — QUETIAPINE FUMARATE 50 MG: 50 TABLET, EXTENDED RELEASE ORAL at 20:58

## 2023-04-16 RX ADMIN — SENNOSIDES 17.2 MG: 8.6 TABLET, COATED ORAL at 19:58

## 2023-04-16 RX ADMIN — DEXMEDETOMIDINE 0.6 MCG/KG/HR: 100 INJECTION, SOLUTION INTRAVENOUS at 04:36

## 2023-04-16 RX ADMIN — PANTOPRAZOLE SODIUM 40 MG: 40 TABLET, DELAYED RELEASE ORAL at 05:39

## 2023-04-16 RX ADMIN — ASPIRIN 81 MG: 81 TABLET, COATED ORAL at 08:12

## 2023-04-16 RX ADMIN — BUMETANIDE 2 MG: 0.25 INJECTION, SOLUTION INTRAMUSCULAR; INTRAVENOUS at 19:58

## 2023-04-16 RX ADMIN — CLONAZEPAM 1 MG: 0.5 TABLET ORAL at 14:48

## 2023-04-16 RX ADMIN — METOPROLOL TARTRATE 25 MG: 25 TABLET, FILM COATED ORAL at 08:12

## 2023-04-16 RX ADMIN — IPRATROPIUM BROMIDE AND ALBUTEROL SULFATE 1 AMPULE: 2.5; .5 SOLUTION RESPIRATORY (INHALATION) at 14:18

## 2023-04-16 RX ADMIN — GUAIFENESIN 600 MG: 600 TABLET ORAL at 08:13

## 2023-04-16 RX ADMIN — METOPROLOL TARTRATE 25 MG: 25 TABLET, FILM COATED ORAL at 19:58

## 2023-04-16 RX ADMIN — AMIODARONE HYDROCHLORIDE 400 MG: 200 TABLET ORAL at 08:12

## 2023-04-16 RX ADMIN — TAMSULOSIN HYDROCHLORIDE 0.4 MG: 0.4 CAPSULE ORAL at 08:12

## 2023-04-16 RX ADMIN — OXYCODONE AND ACETAMINOPHEN 1 TABLET: 5; 325 TABLET ORAL at 20:58

## 2023-04-16 RX ADMIN — SODIUM CHLORIDE, PRESERVATIVE FREE 10 ML: 5 INJECTION INTRAVENOUS at 08:10

## 2023-04-16 RX ADMIN — DEXMEDETOMIDINE 0.6 MCG/KG/HR: 100 INJECTION, SOLUTION INTRAVENOUS at 00:29

## 2023-04-16 RX ADMIN — Medication 3 MG: at 22:45

## 2023-04-16 RX ADMIN — NYSTATIN: 100000 CREAM TOPICAL at 08:13

## 2023-04-16 RX ADMIN — INSULIN GLARGINE 50 UNITS: 100 INJECTION, SOLUTION SUBCUTANEOUS at 08:11

## 2023-04-16 RX ADMIN — IPRATROPIUM BROMIDE AND ALBUTEROL SULFATE 1 AMPULE: 2.5; .5 SOLUTION RESPIRATORY (INHALATION) at 19:30

## 2023-04-16 RX ADMIN — ENOXAPARIN SODIUM 180 MG: 100 INJECTION SUBCUTANEOUS at 19:58

## 2023-04-16 RX ADMIN — SENNOSIDES 17.2 MG: 8.6 TABLET, COATED ORAL at 08:12

## 2023-04-16 RX ADMIN — IPRATROPIUM BROMIDE AND ALBUTEROL SULFATE 1 AMPULE: 2.5; .5 SOLUTION RESPIRATORY (INHALATION) at 07:25

## 2023-04-16 RX ADMIN — CEFEPIME 2000 MG: 2 INJECTION, POWDER, FOR SOLUTION INTRAVENOUS at 23:54

## 2023-04-16 RX ADMIN — CLONAZEPAM 1 MG: 0.5 TABLET ORAL at 20:58

## 2023-04-16 RX ADMIN — GUAIFENESIN 600 MG: 600 TABLET ORAL at 19:58

## 2023-04-16 RX ADMIN — NYSTATIN: 100000 CREAM TOPICAL at 19:58

## 2023-04-16 RX ADMIN — CEFEPIME 2000 MG: 2 INJECTION, POWDER, FOR SOLUTION INTRAVENOUS at 08:16

## 2023-04-16 RX ADMIN — ENOXAPARIN SODIUM 180 MG: 100 INJECTION SUBCUTANEOUS at 08:11

## 2023-04-16 ASSESSMENT — PAIN SCALES - GENERAL
PAINLEVEL_OUTOF10: 10
PAINLEVEL_OUTOF10: 6
PAINLEVEL_OUTOF10: 0
PAINLEVEL_OUTOF10: 0

## 2023-04-16 ASSESSMENT — PAIN DESCRIPTION - LOCATION
LOCATION: ARM
LOCATION: BACK

## 2023-04-16 ASSESSMENT — PAIN DESCRIPTION - ORIENTATION: ORIENTATION: LEFT

## 2023-04-17 VITALS
WEIGHT: 315 LBS | DIASTOLIC BLOOD PRESSURE: 67 MMHG | BODY MASS INDEX: 42.66 KG/M2 | HEART RATE: 116 BPM | HEIGHT: 72 IN | SYSTOLIC BLOOD PRESSURE: 136 MMHG | RESPIRATION RATE: 18 BRPM | OXYGEN SATURATION: 95 % | TEMPERATURE: 97.6 F

## 2023-04-17 LAB
ABSOLUTE EOS #: 0.42 K/UL (ref 0–0.44)
ABSOLUTE IMMATURE GRANULOCYTE: 0.34 K/UL (ref 0–0.3)
ABSOLUTE LYMPH #: 0.93 K/UL (ref 1.1–3.7)
ABSOLUTE MONO #: 0.94 K/UL (ref 0.1–1.2)
ANION GAP SERPL CALCULATED.3IONS-SCNC: 9 MMOL/L (ref 9–17)
BASOPHILS # BLD: 1 % (ref 0–2)
BASOPHILS ABSOLUTE: 0.06 K/UL (ref 0–0.2)
BUN SERPL-MCNC: 21 MG/DL (ref 6–20)
BUN/CREAT BLD: 16 (ref 9–20)
CALCIUM SERPL-MCNC: 8.8 MG/DL (ref 8.6–10.4)
CHLORIDE SERPL-SCNC: 92 MMOL/L (ref 98–107)
CO2 SERPL-SCNC: 31 MMOL/L (ref 20–31)
CREAT SERPL-MCNC: 1.29 MG/DL (ref 0.7–1.2)
EOSINOPHILS RELATIVE PERCENT: 3 % (ref 1–4)
GFR SERPL CREATININE-BSD FRML MDRD: >60 ML/MIN/1.73M2
GLUCOSE BLD-MCNC: 115 MG/DL (ref 75–110)
GLUCOSE BLD-MCNC: 148 MG/DL (ref 75–110)
GLUCOSE BLD-MCNC: 149 MG/DL (ref 75–110)
GLUCOSE BLD-MCNC: 184 MG/DL (ref 75–110)
GLUCOSE SERPL-MCNC: 122 MG/DL (ref 70–99)
HCT VFR BLD AUTO: 28.4 % (ref 40.7–50.3)
HGB BLD-MCNC: 8.6 G/DL (ref 13–17)
IMMATURE GRANULOCYTES: 3 %
LV EF: 60 %
LVEF MODALITY: NORMAL
LYMPHOCYTES # BLD: 8 % (ref 24–43)
MCH RBC QN AUTO: 26 PG (ref 25.2–33.5)
MCHC RBC AUTO-ENTMCNC: 30.3 G/DL (ref 28.4–34.8)
MCV RBC AUTO: 85.8 FL (ref 82.6–102.9)
MONOCYTES # BLD: 8 % (ref 3–12)
NRBC AUTOMATED: 0 PER 100 WBC
PDW BLD-RTO: 14.2 % (ref 11.8–14.4)
PLATELET # BLD AUTO: 137 K/UL (ref 138–453)
PMV BLD AUTO: 9.6 FL (ref 8.1–13.5)
POTASSIUM SERPL-SCNC: 3.4 MMOL/L (ref 3.7–5.3)
RBC # BLD: 3.31 M/UL (ref 4.21–5.77)
SEG NEUTROPHILS: 78 % (ref 36–65)
SEGMENTED NEUTROPHILS ABSOLUTE COUNT: 9.69 K/UL (ref 1.5–8.1)
SODIUM SERPL-SCNC: 132 MMOL/L (ref 135–144)
WBC # BLD AUTO: 12.4 K/UL (ref 3.5–11.3)

## 2023-04-17 PROCEDURE — 2500000003 HC RX 250 WO HCPCS: Performed by: INTERNAL MEDICINE

## 2023-04-17 PROCEDURE — 6370000000 HC RX 637 (ALT 250 FOR IP): Performed by: INTERNAL MEDICINE

## 2023-04-17 PROCEDURE — 6370000000 HC RX 637 (ALT 250 FOR IP): Performed by: NURSE PRACTITIONER

## 2023-04-17 PROCEDURE — 6360000002 HC RX W HCPCS: Performed by: STUDENT IN AN ORGANIZED HEALTH CARE EDUCATION/TRAINING PROGRAM

## 2023-04-17 PROCEDURE — 99239 HOSP IP/OBS DSCHRG MGMT >30: CPT | Performed by: NURSE PRACTITIONER

## 2023-04-17 PROCEDURE — 6370000000 HC RX 637 (ALT 250 FOR IP): Performed by: STUDENT IN AN ORGANIZED HEALTH CARE EDUCATION/TRAINING PROGRAM

## 2023-04-17 PROCEDURE — 2580000003 HC RX 258: Performed by: STUDENT IN AN ORGANIZED HEALTH CARE EDUCATION/TRAINING PROGRAM

## 2023-04-17 PROCEDURE — 82947 ASSAY GLUCOSE BLOOD QUANT: CPT

## 2023-04-17 PROCEDURE — 2580000003 HC RX 258: Performed by: NURSE PRACTITIONER

## 2023-04-17 PROCEDURE — 2700000000 HC OXYGEN THERAPY PER DAY

## 2023-04-17 PROCEDURE — 80048 BASIC METABOLIC PNL TOTAL CA: CPT

## 2023-04-17 PROCEDURE — 6370000000 HC RX 637 (ALT 250 FOR IP)

## 2023-04-17 PROCEDURE — 36415 COLL VENOUS BLD VENIPUNCTURE: CPT

## 2023-04-17 PROCEDURE — 85025 COMPLETE CBC W/AUTO DIFF WBC: CPT

## 2023-04-17 PROCEDURE — 94761 N-INVAS EAR/PLS OXIMETRY MLT: CPT

## 2023-04-17 PROCEDURE — 6360000002 HC RX W HCPCS: Performed by: INTERNAL MEDICINE

## 2023-04-17 PROCEDURE — 93308 TTE F-UP OR LMTD: CPT

## 2023-04-17 PROCEDURE — 94660 CPAP INITIATION&MGMT: CPT

## 2023-04-17 PROCEDURE — 6370000000 HC RX 637 (ALT 250 FOR IP): Performed by: FAMILY MEDICINE

## 2023-04-17 PROCEDURE — 94640 AIRWAY INHALATION TREATMENT: CPT

## 2023-04-17 RX ORDER — BUMETANIDE 1 MG/1
1 TABLET ORAL 2 TIMES DAILY
Qty: 30 TABLET | Refills: 3 | Status: SHIPPED | OUTPATIENT
Start: 2023-04-18

## 2023-04-17 RX ORDER — BUMETANIDE 1 MG/1
1 TABLET ORAL 2 TIMES DAILY
Status: DISCONTINUED | OUTPATIENT
Start: 2023-04-18 | End: 2023-04-18 | Stop reason: HOSPADM

## 2023-04-17 RX ORDER — AMIODARONE HYDROCHLORIDE 200 MG/1
200 TABLET ORAL 2 TIMES DAILY
Qty: 60 TABLET | Refills: 3 | Status: SHIPPED | OUTPATIENT
Start: 2023-04-17 | End: 2023-05-17

## 2023-04-17 RX ORDER — IPRATROPIUM BROMIDE AND ALBUTEROL SULFATE 2.5; .5 MG/3ML; MG/3ML
1 SOLUTION RESPIRATORY (INHALATION) EVERY 4 HOURS PRN
Status: DISCONTINUED | OUTPATIENT
Start: 2023-04-17 | End: 2023-04-18 | Stop reason: HOSPADM

## 2023-04-17 RX ORDER — AMIODARONE HYDROCHLORIDE 200 MG/1
200 TABLET ORAL 2 TIMES DAILY
Status: DISCONTINUED | OUTPATIENT
Start: 2023-04-17 | End: 2023-04-18 | Stop reason: HOSPADM

## 2023-04-17 RX ORDER — METOPROLOL TARTRATE 50 MG/1
50 TABLET, FILM COATED ORAL 2 TIMES DAILY
Qty: 60 TABLET | Refills: 3 | Status: SHIPPED | OUTPATIENT
Start: 2023-04-17

## 2023-04-17 RX ORDER — METOPROLOL TARTRATE 50 MG/1
50 TABLET, FILM COATED ORAL 2 TIMES DAILY
Status: DISCONTINUED | OUTPATIENT
Start: 2023-04-17 | End: 2023-04-18 | Stop reason: HOSPADM

## 2023-04-17 RX ADMIN — INSULIN GLARGINE 50 UNITS: 100 INJECTION, SOLUTION SUBCUTANEOUS at 08:36

## 2023-04-17 RX ADMIN — AMIODARONE HYDROCHLORIDE 200 MG: 200 TABLET ORAL at 19:56

## 2023-04-17 RX ADMIN — AMIODARONE HYDROCHLORIDE 400 MG: 200 TABLET ORAL at 08:25

## 2023-04-17 RX ADMIN — SODIUM CHLORIDE, PRESERVATIVE FREE 10 ML: 5 INJECTION INTRAVENOUS at 19:58

## 2023-04-17 RX ADMIN — ASPIRIN 81 MG: 81 TABLET, COATED ORAL at 08:24

## 2023-04-17 RX ADMIN — CEFEPIME 2000 MG: 2 INJECTION, POWDER, FOR SOLUTION INTRAVENOUS at 08:21

## 2023-04-17 RX ADMIN — IPRATROPIUM BROMIDE AND ALBUTEROL SULFATE 1 AMPULE: 2.5; .5 SOLUTION RESPIRATORY (INHALATION) at 15:11

## 2023-04-17 RX ADMIN — TAMSULOSIN HYDROCHLORIDE 0.4 MG: 0.4 CAPSULE ORAL at 08:24

## 2023-04-17 RX ADMIN — PANTOPRAZOLE SODIUM 40 MG: 40 TABLET, DELAYED RELEASE ORAL at 05:43

## 2023-04-17 RX ADMIN — SENNOSIDES 17.2 MG: 8.6 TABLET, COATED ORAL at 19:55

## 2023-04-17 RX ADMIN — IPRATROPIUM BROMIDE AND ALBUTEROL SULFATE 1 AMPULE: 2.5; .5 SOLUTION RESPIRATORY (INHALATION) at 09:13

## 2023-04-17 RX ADMIN — CEFEPIME 2000 MG: 2 INJECTION, POWDER, FOR SOLUTION INTRAVENOUS at 15:45

## 2023-04-17 RX ADMIN — SODIUM CHLORIDE, PRESERVATIVE FREE 10 ML: 5 INJECTION INTRAVENOUS at 10:13

## 2023-04-17 RX ADMIN — OXYCODONE AND ACETAMINOPHEN 1 TABLET: 5; 325 TABLET ORAL at 09:18

## 2023-04-17 RX ADMIN — ENOXAPARIN SODIUM 180 MG: 100 INJECTION SUBCUTANEOUS at 08:23

## 2023-04-17 RX ADMIN — INSULIN GLARGINE 50 UNITS: 100 INJECTION, SOLUTION SUBCUTANEOUS at 19:59

## 2023-04-17 RX ADMIN — PERFLUTREN 1.5 ML: 6.52 INJECTION, SUSPENSION INTRAVENOUS at 10:13

## 2023-04-17 RX ADMIN — GUAIFENESIN 600 MG: 600 TABLET ORAL at 08:25

## 2023-04-17 RX ADMIN — METOPROLOL TARTRATE 25 MG: 25 TABLET, FILM COATED ORAL at 08:24

## 2023-04-17 RX ADMIN — BUMETANIDE 2 MG: 0.25 INJECTION, SOLUTION INTRAMUSCULAR; INTRAVENOUS at 08:21

## 2023-04-17 RX ADMIN — NYSTATIN: 100000 CREAM TOPICAL at 19:58

## 2023-04-17 RX ADMIN — POTASSIUM BICARBONATE 40 MEQ: 782 TABLET, EFFERVESCENT ORAL at 05:37

## 2023-04-17 RX ADMIN — APIXABAN 5 MG: 5 TABLET, FILM COATED ORAL at 19:55

## 2023-04-17 RX ADMIN — LEVOTHYROXINE SODIUM 200 MCG: 0.2 TABLET ORAL at 05:38

## 2023-04-17 RX ADMIN — CLONAZEPAM 1 MG: 0.5 TABLET ORAL at 11:30

## 2023-04-17 RX ADMIN — METOPROLOL TARTRATE 25 MG: 25 TABLET, FILM COATED ORAL at 10:44

## 2023-04-17 RX ADMIN — SENNOSIDES 17.2 MG: 8.6 TABLET, COATED ORAL at 08:24

## 2023-04-17 RX ADMIN — METOPROLOL TARTRATE 50 MG: 50 TABLET, FILM COATED ORAL at 19:56

## 2023-04-17 RX ADMIN — GUAIFENESIN 600 MG: 600 TABLET ORAL at 19:55

## 2023-04-17 RX ADMIN — SODIUM CHLORIDE, PRESERVATIVE FREE 10 ML: 5 INJECTION INTRAVENOUS at 08:21

## 2023-04-17 RX ADMIN — NYSTATIN: 100000 CREAM TOPICAL at 08:25

## 2023-04-17 ASSESSMENT — PAIN DESCRIPTION - LOCATION
LOCATION: GENERALIZED
LOCATION: LEG
LOCATION: GENERALIZED

## 2023-04-17 ASSESSMENT — PAIN - FUNCTIONAL ASSESSMENT: PAIN_FUNCTIONAL_ASSESSMENT: PREVENTS OR INTERFERES SOME ACTIVE ACTIVITIES AND ADLS

## 2023-04-17 ASSESSMENT — PAIN DESCRIPTION - DESCRIPTORS: DESCRIPTORS: ACHING

## 2023-04-17 ASSESSMENT — PAIN SCALES - GENERAL
PAINLEVEL_OUTOF10: 2
PAINLEVEL_OUTOF10: 3

## 2023-04-17 ASSESSMENT — PAIN DESCRIPTION - ORIENTATION: ORIENTATION: RIGHT;LEFT

## 2023-04-17 NOTE — FLOWSHEET NOTE
04/17/23 0914   Treatment Team Notification   Reason for Communication Evaluate; Review case   Team Member Name Dinah Has  (get ok from other services for dishcarge today)   Treatment Team Role Nurse/Charge Nurse   Method of Communication Face to face   Response In department   Notification Time 0858

## 2023-04-17 NOTE — CARE COORDINATION
Discharge planning    Called son Ariela Baum to inform him of patient's discharge and faxed AVS to [facility] agency. Melissa Swartz from [facility] also informed of patient's discharge today. Notified [facility] and left a VM at [phone]. Ita from [facility] called  [phone]. Informed writer that the nurse that was coming out BID for blood sugars was [facility]. [facility] discharged the patient and Hermila Whitaker is working on the trying to get another company to go out to do his blood sugars. Spoke with the patient about [facility] discharging him,  and he states that he can do it himself and has others to help as well. He states that he is going home regardless. He was on the phone with Ita ([facility]) and was telling her that he was going home regardless. Writer spoke with RN and she will educate the patient on when to check blood sugars and will inform [facility] of this as well.

## 2023-04-17 NOTE — PLAN OF CARE
Problem: Respiratory - Adult  Goal: Achieves optimal ventilation and oxygenation  4/17/2023 0917 by Ez Rosas RCP  Outcome: Progressing  4/17/2023 0753 by Anisha Morris RN  Outcome: Progressing  Flowsheets (Taken 4/16/2023 2000 by Beckie Mcdonough, RN)  Achieves optimal ventilation and oxygenation:   Assess for changes in respiratory status   Position to facilitate oxygenation and minimize respiratory effort   Oxygen supplementation based on oxygen saturation or arterial blood gases   Assess for changes in mentation and behavior   Encourage broncho-pulmonary hygiene including cough, deep breathe, incentive spirometry   Assess the need for suctioning and aspirate as needed   Assess and instruct to report shortness of breath or any respiratory difficulty   Respiratory therapy support as indicated  4/16/2023 1923 by Eduin Tapia RCP  Outcome: Progressing  Goal: Adequate oxygenation  Description: Adequate oxygenation  4/17/2023 0917 by Ez Rosas RCP  Outcome: Progressing  4/16/2023 1923 by Eduin Tapia RCP  Outcome: Progressing  Goal: Able to breathe comfortably  4/17/2023 0917 by Ez Rosas RCP  Outcome: Progressing  4/16/2023 1923 by Eduin Tapia RCP  Outcome: Progressing  Goal: Patient's breath sounds will be clear and equal  4/17/2023 0917 by Ez Rosas RCP  Outcome: Progressing  4/16/2023 1923 by Eduin Tapia RCP  Outcome: Progressing

## 2023-04-17 NOTE — FLOWSHEET NOTE
04/17/23 0950   Treatment Team Notification   Reason for Communication Evaluate; Review case   Team Member Name KrissyJonnie   Treatment Team Role Consulting Provider   Method of Communication Face to face   Response In department   Notification Time 8457     Switch IV bumex to PO route, Eliquis 5 BID, new orders for amio & metoprolol. May discharge if HR stable today. Follow up In office.

## 2023-04-17 NOTE — DISCHARGE SUMMARY
Hospital Course:  Kati Wilkerson is a 60 y.o. male history of multiple comorbidities including FABI, GERD, morbid obesity, noncompliance, chronic respiratory failure on home oxygen, chronic Clemons catheter in situ, generalized anxiety disorder, type 2 diabetes, hypertension and chronic lymphedema who was admitted for the management of  Hyponatremia , presented to ER with Constipation and Fatigue  Patient was started on IV fluids and his constipation and hyponatremia improved. Urology was consulted due to chronic Clemons catheter, patient was recently treated with Macrobid for Proteus UTI about a week ago and his Clemons catheter had been recently exchanged prior to hospitalization. Current catheter was okay to remain in place and he is to follow-up outpatient as previously scheduled for his next exchange. Patient also developed acute on chronic respiratory failure and respiratory acidosis with compensated metabolic alkalosis. Patient was placed on continuous BiPAP and required sedation for compliance and pulmonology was consulted and patient. Patient's respiratory status slowly improved and he was able to return to baseline oxygen level via nasal cannula. Cardiology was consulted as patient went into rapid A-fib, he was started on oral amiodarone and beta-blocker was increased. Patient has remained in A-fib however his heart rate is controlled. Patient has declined cardioversion. Patient was evaluated by PT/OT and it was determined that he could benefit from continued inpatient therapy however patient refused placement. Patient was treated for UTI secondary to chronic Clemons and completed 5-day course of cefepime.   Patient is stable for discharge home with outpatient follow-up    Significant therapeutic interventions: See above    Significant Diagnostic Studies:   Labs / Micro:  CBC:   Lab Results   Component Value Date/Time    WBC 12.4 04/17/2023 03:03 AM    RBC 3.31 04/17/2023 03:03 AM    RBC 3.93

## 2023-04-17 NOTE — PLAN OF CARE
Problem: Discharge Planning  Goal: Discharge to home or other facility with appropriate resources  Outcome: Progressing  Flowsheets (Taken 4/16/2023 2000 by Ghulam Qiu, RN)  Discharge to home or other facility with appropriate resources: Identify barriers to discharge with patient and caregiver     Problem: Safety - Adult  Goal: Free from fall injury  Outcome: Progressing     Problem: ABCDS Injury Assessment  Goal: Absence of physical injury  Outcome: Progressing     Problem: Skin/Tissue Integrity  Goal: Absence of new skin breakdown  Description: 1. Monitor for areas of redness and/or skin breakdown  2. Assess vascular access sites hourly  3. Every 4-6 hours minimum:  Change oxygen saturation probe site  4. Every 4-6 hours:  If on nasal continuous positive airway pressure, respiratory therapy assess nares and determine need for appliance change or resting period.   Outcome: Progressing     Problem: Pain  Goal: Verbalizes/displays adequate comfort level or baseline comfort level  Outcome: Progressing     Problem: Chronic Conditions and Co-morbidities  Goal: Patient's chronic conditions and co-morbidity symptoms are monitored and maintained or improved  Outcome: Progressing  Flowsheets (Taken 4/16/2023 2000 by Ghulam Qiu RN)  Care Plan - Patient's Chronic Conditions and Co-Morbidity Symptoms are Monitored and Maintained or Improved: Monitor and assess patient's chronic conditions and comorbid symptoms for stability, deterioration, or improvement     Problem: Respiratory - Adult  Goal: Achieves optimal ventilation and oxygenation  4/17/2023 0753 by Rima Read RN  Outcome: Progressing  Flowsheets (Taken 4/16/2023 2000 by Ghulam Qiu RN)  Achieves optimal ventilation and oxygenation:   Assess for changes in respiratory status   Position to facilitate oxygenation and minimize respiratory effort   Oxygen supplementation based on oxygen saturation or arterial blood gases   Assess for changes in

## 2023-04-17 NOTE — FLOWSHEET NOTE
Discharge Note:      All discharge instructions given at this time as well as all patient belongings returned to patient. Pt denies any further questions regarding discharge at this time. Pt given also given discharge packet with unit letter, discharge instructions/restrictions and medication handouts regarding all discharge medications and side effects. Pharmacy delivered patient scripts to room(4 total) eliquis, amiodarone, bumex & metoprolol.

## 2023-04-17 NOTE — CARE COORDINATION
Discharge planning    Patient is still refusing SNF. [facility]PS has been notified. Patient has  through [facility]. Has Michelle caring for [facility] and [facility]. Aide services daily. Plan is to discharge home today. Lives with son. Setting up transportation through [employer] .

## 2023-04-18 ENCOUNTER — ANESTHESIA (OUTPATIENT)
Dept: OPERATING ROOM | Age: 58
End: 2023-04-18
Payer: MEDICARE

## 2023-04-18 ENCOUNTER — ANESTHESIA EVENT (OUTPATIENT)
Dept: OPERATING ROOM | Age: 58
End: 2023-04-18
Payer: MEDICARE

## 2023-04-18 ENCOUNTER — HOSPITAL ENCOUNTER (INPATIENT)
Age: 58
LOS: 3 days | Discharge: HOME HEALTH | DRG: 378 | End: 2023-04-21
Attending: EMERGENCY MEDICINE | Admitting: INTERNAL MEDICINE
Payer: MEDICARE

## 2023-04-18 DIAGNOSIS — K62.5 RECTAL BLEEDING: Primary | ICD-10-CM

## 2023-04-18 PROBLEM — D68.69 SECONDARY HYPERCOAGULABLE STATE (HCC): Status: ACTIVE | Noted: 2023-04-18

## 2023-04-18 PROBLEM — I48.91 A-FIB (HCC): Status: ACTIVE | Noted: 2023-04-18

## 2023-04-18 PROBLEM — K92.2 ACUTE GI BLEEDING: Status: ACTIVE | Noted: 2023-04-18

## 2023-04-18 LAB
ABSOLUTE EOS #: 0.79 K/UL (ref 0–0.44)
ABSOLUTE IMMATURE GRANULOCYTE: 0.5 K/UL (ref 0–0.3)
ABSOLUTE LYMPH #: 1.07 K/UL (ref 1.1–3.7)
ABSOLUTE MONO #: 1.3 K/UL (ref 0.1–1.2)
ABSOLUTE RETIC #: 0.12 M/UL (ref 0.03–0.08)
ALBUMIN SERPL-MCNC: 3.1 G/DL (ref 3.5–5.2)
ALP SERPL-CCNC: 339 U/L (ref 40–129)
ALT SERPL-CCNC: 30 U/L (ref 5–41)
ANION GAP SERPL CALCULATED.3IONS-SCNC: 9 MMOL/L (ref 9–17)
AST SERPL-CCNC: 27 U/L
BASOPHILS # BLD: 1 % (ref 0–2)
BASOPHILS ABSOLUTE: 0.09 K/UL (ref 0–0.2)
BILIRUB DIRECT SERPL-MCNC: 0.2 MG/DL
BILIRUB INDIRECT SERPL-MCNC: 0.4 MG/DL (ref 0–1)
BILIRUB SERPL-MCNC: 0.6 MG/DL (ref 0.3–1.2)
BUN SERPL-MCNC: 20 MG/DL (ref 6–20)
BUN/CREAT BLD: 15 (ref 9–20)
CALCIUM SERPL-MCNC: 8.7 MG/DL (ref 8.6–10.4)
CHLORIDE SERPL-SCNC: 89 MMOL/L (ref 98–107)
CO2 SERPL-SCNC: 31 MMOL/L (ref 20–31)
CREAT SERPL-MCNC: 1.36 MG/DL (ref 0.7–1.2)
DATE, STOOL #1: ABNORMAL
EOSINOPHILS RELATIVE PERCENT: 4 % (ref 1–4)
FERRITIN SERPL-MCNC: 217 NG/ML (ref 30–400)
GFR SERPL CREATININE-BSD FRML MDRD: 60 ML/MIN/1.73M2
GLUCOSE BLD-MCNC: 123 MG/DL (ref 75–110)
GLUCOSE BLD-MCNC: 124 MG/DL (ref 75–110)
GLUCOSE BLD-MCNC: 129 MG/DL (ref 75–110)
GLUCOSE SERPL-MCNC: 139 MG/DL (ref 70–99)
HCT VFR BLD AUTO: 30 % (ref 40.7–50.3)
HEMOCCULT SP1 STL QL: POSITIVE
HGB BLD-MCNC: 8.9 G/DL (ref 13–17)
IMMATURE GRANULOCYTES: 3 %
IMMATURE RETIC FRACT: 36.2 % (ref 2.7–18.3)
INR PPP: 1.3
IRON SATURATION: 19 % (ref 20–55)
IRON SERPL-MCNC: 47 UG/DL (ref 59–158)
LIPASE SERPL-CCNC: 36 U/L (ref 13–60)
LYMPHOCYTES # BLD: 6 % (ref 24–43)
MAGNESIUM SERPL-MCNC: 1.9 MG/DL (ref 1.6–2.6)
MCH RBC QN AUTO: 26 PG (ref 25.2–33.5)
MCHC RBC AUTO-ENTMCNC: 29.7 G/DL (ref 28.4–34.8)
MCV RBC AUTO: 87.7 FL (ref 82.6–102.9)
MONOCYTES # BLD: 7 % (ref 3–12)
NRBC AUTOMATED: 0 PER 100 WBC
PDW BLD-RTO: 14.3 % (ref 11.8–14.4)
PLATELET # BLD AUTO: 180 K/UL (ref 138–453)
PMV BLD AUTO: 10 FL (ref 8.1–13.5)
POTASSIUM SERPL-SCNC: 4.2 MMOL/L (ref 3.7–5.3)
PROT SERPL-MCNC: 7.1 G/DL (ref 6.4–8.3)
PROTHROMBIN TIME: 16.5 SEC (ref 11.5–14.2)
RBC # BLD: 3.42 M/UL (ref 4.21–5.77)
RETIC HEMOGLOBIN: 25.6 PG (ref 28.2–35.7)
RETICS/RBC NFR AUTO: 3.3 % (ref 0.5–1.9)
SEG NEUTROPHILS: 79 % (ref 36–65)
SEGMENTED NEUTROPHILS ABSOLUTE COUNT: 14.19 K/UL (ref 1.5–8.1)
SODIUM SERPL-SCNC: 129 MMOL/L (ref 135–144)
TIBC SERPL-MCNC: 249 UG/DL (ref 250–450)
TIME, STOOL #1: 1445
UNSATURATED IRON BINDING CAPACITY: 202 UG/DL (ref 112–347)
WBC # BLD AUTO: 17.9 K/UL (ref 3.5–11.3)

## 2023-04-18 PROCEDURE — 2580000003 HC RX 258: Performed by: EMERGENCY MEDICINE

## 2023-04-18 PROCEDURE — 83690 ASSAY OF LIPASE: CPT

## 2023-04-18 PROCEDURE — C9113 INJ PANTOPRAZOLE SODIUM, VIA: HCPCS | Performed by: EMERGENCY MEDICINE

## 2023-04-18 PROCEDURE — 2580000003 HC RX 258: Performed by: NURSE PRACTITIONER

## 2023-04-18 PROCEDURE — 7100000000 HC PACU RECOVERY - FIRST 15 MIN: Performed by: INTERNAL MEDICINE

## 2023-04-18 PROCEDURE — 80048 BASIC METABOLIC PNL TOTAL CA: CPT

## 2023-04-18 PROCEDURE — 83540 ASSAY OF IRON: CPT

## 2023-04-18 PROCEDURE — 86920 COMPATIBILITY TEST SPIN: CPT

## 2023-04-18 PROCEDURE — 6360000002 HC RX W HCPCS

## 2023-04-18 PROCEDURE — 85610 PROTHROMBIN TIME: CPT

## 2023-04-18 PROCEDURE — 6360000002 HC RX W HCPCS: Performed by: EMERGENCY MEDICINE

## 2023-04-18 PROCEDURE — 6370000000 HC RX 637 (ALT 250 FOR IP): Performed by: NURSE PRACTITIONER

## 2023-04-18 PROCEDURE — 3700000000 HC ANESTHESIA ATTENDED CARE: Performed by: INTERNAL MEDICINE

## 2023-04-18 PROCEDURE — 86850 RBC ANTIBODY SCREEN: CPT

## 2023-04-18 PROCEDURE — 86901 BLOOD TYPING SEROLOGIC RH(D): CPT

## 2023-04-18 PROCEDURE — 83550 IRON BINDING TEST: CPT

## 2023-04-18 PROCEDURE — 2500000003 HC RX 250 WO HCPCS: Performed by: NURSE ANESTHETIST, CERTIFIED REGISTERED

## 2023-04-18 PROCEDURE — 2709999900 HC NON-CHARGEABLE SUPPLY: Performed by: INTERNAL MEDICINE

## 2023-04-18 PROCEDURE — 93005 ELECTROCARDIOGRAM TRACING: CPT | Performed by: INTERNAL MEDICINE

## 2023-04-18 PROCEDURE — 96365 THER/PROPH/DIAG IV INF INIT: CPT

## 2023-04-18 PROCEDURE — 3609017100 HC EGD: Performed by: INTERNAL MEDICINE

## 2023-04-18 PROCEDURE — 80076 HEPATIC FUNCTION PANEL: CPT

## 2023-04-18 PROCEDURE — 96375 TX/PRO/DX INJ NEW DRUG ADDON: CPT

## 2023-04-18 PROCEDURE — 2060000000 HC ICU INTERMEDIATE R&B

## 2023-04-18 PROCEDURE — 36430 TRANSFUSION BLD/BLD COMPNT: CPT

## 2023-04-18 PROCEDURE — 96374 THER/PROPH/DIAG INJ IV PUSH: CPT

## 2023-04-18 PROCEDURE — 3700000001 HC ADD 15 MINUTES (ANESTHESIA): Performed by: INTERNAL MEDICINE

## 2023-04-18 PROCEDURE — 82728 ASSAY OF FERRITIN: CPT

## 2023-04-18 PROCEDURE — 6360000002 HC RX W HCPCS: Performed by: NURSE ANESTHETIST, CERTIFIED REGISTERED

## 2023-04-18 PROCEDURE — P9040 RBC LEUKOREDUCED IRRADIATED: HCPCS

## 2023-04-18 PROCEDURE — 96376 TX/PRO/DX INJ SAME DRUG ADON: CPT

## 2023-04-18 PROCEDURE — 99285 EMERGENCY DEPT VISIT HI MDM: CPT

## 2023-04-18 PROCEDURE — 85045 AUTOMATED RETICULOCYTE COUNT: CPT

## 2023-04-18 PROCEDURE — 86900 BLOOD TYPING SEROLOGIC ABO: CPT

## 2023-04-18 PROCEDURE — 82270 OCCULT BLOOD FECES: CPT

## 2023-04-18 PROCEDURE — 0DJ08ZZ INSPECTION OF UPPER INTESTINAL TRACT, VIA NATURAL OR ARTIFICIAL OPENING ENDOSCOPIC: ICD-10-PCS | Performed by: INTERNAL MEDICINE

## 2023-04-18 PROCEDURE — 85025 COMPLETE CBC W/AUTO DIFF WBC: CPT

## 2023-04-18 PROCEDURE — 7100000001 HC PACU RECOVERY - ADDTL 15 MIN: Performed by: INTERNAL MEDICINE

## 2023-04-18 PROCEDURE — 82947 ASSAY GLUCOSE BLOOD QUANT: CPT

## 2023-04-18 PROCEDURE — 83735 ASSAY OF MAGNESIUM: CPT

## 2023-04-18 PROCEDURE — 99223 1ST HOSP IP/OBS HIGH 75: CPT | Performed by: NURSE PRACTITIONER

## 2023-04-18 RX ORDER — SODIUM CHLORIDE 9 MG/ML
INJECTION, SOLUTION INTRAVENOUS PRN
Status: DISCONTINUED | OUTPATIENT
Start: 2023-04-18 | End: 2023-04-21 | Stop reason: HOSPADM

## 2023-04-18 RX ORDER — OXYCODONE HYDROCHLORIDE AND ACETAMINOPHEN 5; 325 MG/1; MG/1
1 TABLET ORAL EVERY 6 HOURS PRN
Status: DISCONTINUED | OUTPATIENT
Start: 2023-04-18 | End: 2023-04-21 | Stop reason: HOSPADM

## 2023-04-18 RX ORDER — ONDANSETRON 2 MG/ML
4 INJECTION INTRAMUSCULAR; INTRAVENOUS EVERY 6 HOURS PRN
Status: DISCONTINUED | OUTPATIENT
Start: 2023-04-18 | End: 2023-04-21 | Stop reason: HOSPADM

## 2023-04-18 RX ORDER — ONDANSETRON 2 MG/ML
INJECTION INTRAMUSCULAR; INTRAVENOUS
Status: COMPLETED
Start: 2023-04-18 | End: 2023-04-18

## 2023-04-18 RX ORDER — SODIUM CHLORIDE 9 MG/ML
INJECTION, SOLUTION INTRAVENOUS CONTINUOUS
Status: DISCONTINUED | OUTPATIENT
Start: 2023-04-18 | End: 2023-04-20

## 2023-04-18 RX ORDER — INSULIN GLARGINE 100 [IU]/ML
25 INJECTION, SOLUTION SUBCUTANEOUS 2 TIMES DAILY
Status: DISCONTINUED | OUTPATIENT
Start: 2023-04-18 | End: 2023-04-21 | Stop reason: HOSPADM

## 2023-04-18 RX ORDER — METOPROLOL TARTRATE 50 MG/1
50 TABLET, FILM COATED ORAL 2 TIMES DAILY
Status: DISCONTINUED | OUTPATIENT
Start: 2023-04-18 | End: 2023-04-21 | Stop reason: HOSPADM

## 2023-04-18 RX ORDER — SODIUM CHLORIDE 0.9 % (FLUSH) 0.9 %
5-40 SYRINGE (ML) INJECTION EVERY 12 HOURS SCHEDULED
Status: DISCONTINUED | OUTPATIENT
Start: 2023-04-18 | End: 2023-04-21 | Stop reason: HOSPADM

## 2023-04-18 RX ORDER — QUETIAPINE FUMARATE 50 MG/1
50 TABLET, EXTENDED RELEASE ORAL NIGHTLY
Status: DISCONTINUED | OUTPATIENT
Start: 2023-04-18 | End: 2023-04-21 | Stop reason: HOSPADM

## 2023-04-18 RX ORDER — ACETAMINOPHEN 650 MG/1
650 SUPPOSITORY RECTAL EVERY 6 HOURS PRN
Status: DISCONTINUED | OUTPATIENT
Start: 2023-04-18 | End: 2023-04-21 | Stop reason: HOSPADM

## 2023-04-18 RX ORDER — 0.9 % SODIUM CHLORIDE 0.9 %
1000 INTRAVENOUS SOLUTION INTRAVENOUS ONCE
Status: COMPLETED | OUTPATIENT
Start: 2023-04-18 | End: 2023-04-18

## 2023-04-18 RX ORDER — CLONAZEPAM 0.5 MG/1
1 TABLET ORAL 2 TIMES DAILY PRN
Status: DISCONTINUED | OUTPATIENT
Start: 2023-04-18 | End: 2023-04-21 | Stop reason: HOSPADM

## 2023-04-18 RX ORDER — ACETAMINOPHEN 325 MG/1
650 TABLET ORAL EVERY 6 HOURS PRN
Status: DISCONTINUED | OUTPATIENT
Start: 2023-04-18 | End: 2023-04-21 | Stop reason: HOSPADM

## 2023-04-18 RX ORDER — INSULIN LISPRO 100 [IU]/ML
0-8 INJECTION, SOLUTION INTRAVENOUS; SUBCUTANEOUS EVERY 4 HOURS
Status: DISCONTINUED | OUTPATIENT
Start: 2023-04-18 | End: 2023-04-20

## 2023-04-18 RX ORDER — SODIUM CHLORIDE 9 MG/ML
50 INJECTION, SOLUTION INTRAVENOUS ONCE
Status: COMPLETED | OUTPATIENT
Start: 2023-04-18 | End: 2023-04-18

## 2023-04-18 RX ORDER — ONDANSETRON 2 MG/ML
4 INJECTION INTRAMUSCULAR; INTRAVENOUS
Status: DISCONTINUED | OUTPATIENT
Start: 2023-04-18 | End: 2023-04-18 | Stop reason: HOSPADM

## 2023-04-18 RX ORDER — KETAMINE HCL IN NACL, ISO-OSM 100MG/10ML
SYRINGE (ML) INJECTION PRN
Status: DISCONTINUED | OUTPATIENT
Start: 2023-04-18 | End: 2023-04-18 | Stop reason: SDUPTHER

## 2023-04-18 RX ORDER — DEXTROSE MONOHYDRATE 100 MG/ML
INJECTION, SOLUTION INTRAVENOUS CONTINUOUS PRN
Status: DISCONTINUED | OUTPATIENT
Start: 2023-04-18 | End: 2023-04-21 | Stop reason: HOSPADM

## 2023-04-18 RX ORDER — DULOXETIN HYDROCHLORIDE 60 MG/1
60 CAPSULE, DELAYED RELEASE ORAL EVERY MORNING
Status: DISCONTINUED | OUTPATIENT
Start: 2023-04-19 | End: 2023-04-21 | Stop reason: HOSPADM

## 2023-04-18 RX ORDER — LANOLIN ALCOHOL/MO/W.PET/CERES
325 CREAM (GRAM) TOPICAL
Status: DISCONTINUED | OUTPATIENT
Start: 2023-04-19 | End: 2023-04-21 | Stop reason: HOSPADM

## 2023-04-18 RX ORDER — ONDANSETRON 4 MG/1
4 TABLET, ORALLY DISINTEGRATING ORAL EVERY 8 HOURS PRN
Status: DISCONTINUED | OUTPATIENT
Start: 2023-04-18 | End: 2023-04-21 | Stop reason: HOSPADM

## 2023-04-18 RX ORDER — PANTOPRAZOLE SODIUM 40 MG/10ML
40 INJECTION, POWDER, LYOPHILIZED, FOR SOLUTION INTRAVENOUS
Status: DISCONTINUED | OUTPATIENT
Start: 2023-04-19 | End: 2023-04-21 | Stop reason: HOSPADM

## 2023-04-18 RX ORDER — PROPOFOL 10 MG/ML
INJECTION, EMULSION INTRAVENOUS CONTINUOUS PRN
Status: DISCONTINUED | OUTPATIENT
Start: 2023-04-18 | End: 2023-04-18 | Stop reason: SDUPTHER

## 2023-04-18 RX ORDER — AMIODARONE HYDROCHLORIDE 200 MG/1
200 TABLET ORAL DAILY
Status: DISCONTINUED | OUTPATIENT
Start: 2023-04-18 | End: 2023-04-21 | Stop reason: HOSPADM

## 2023-04-18 RX ORDER — TAMSULOSIN HYDROCHLORIDE 0.4 MG/1
0.4 CAPSULE ORAL DAILY
Status: DISCONTINUED | OUTPATIENT
Start: 2023-04-18 | End: 2023-04-21 | Stop reason: HOSPADM

## 2023-04-18 RX ORDER — ONDANSETRON 2 MG/ML
4 INJECTION INTRAMUSCULAR; INTRAVENOUS ONCE
Status: COMPLETED | OUTPATIENT
Start: 2023-04-18 | End: 2023-04-18

## 2023-04-18 RX ORDER — SODIUM CHLORIDE 0.9 % (FLUSH) 0.9 %
5-40 SYRINGE (ML) INJECTION PRN
Status: DISCONTINUED | OUTPATIENT
Start: 2023-04-18 | End: 2023-04-21 | Stop reason: HOSPADM

## 2023-04-18 RX ORDER — SODIUM CHLORIDE 9 MG/ML
INJECTION, SOLUTION INTRAVENOUS PRN
Status: DISCONTINUED | OUTPATIENT
Start: 2023-04-18 | End: 2023-04-19

## 2023-04-18 RX ORDER — MIDAZOLAM HYDROCHLORIDE 1 MG/ML
INJECTION INTRAMUSCULAR; INTRAVENOUS PRN
Status: DISCONTINUED | OUTPATIENT
Start: 2023-04-18 | End: 2023-04-18 | Stop reason: SDUPTHER

## 2023-04-18 RX ADMIN — INSULIN GLARGINE 25 UNITS: 100 INJECTION, SOLUTION SUBCUTANEOUS at 22:04

## 2023-04-18 RX ADMIN — TAMSULOSIN HYDROCHLORIDE 0.4 MG: 0.4 CAPSULE ORAL at 22:03

## 2023-04-18 RX ADMIN — QUETIAPINE FUMARATE 50 MG: 50 TABLET, EXTENDED RELEASE ORAL at 22:03

## 2023-04-18 RX ADMIN — SODIUM CHLORIDE: 9 INJECTION, SOLUTION INTRAVENOUS at 16:49

## 2023-04-18 RX ADMIN — PANTOPRAZOLE SODIUM 8 MG/HR: 40 INJECTION, POWDER, LYOPHILIZED, FOR SOLUTION INTRAVENOUS at 15:03

## 2023-04-18 RX ADMIN — PROTHROMBIN, COAGULATION FACTOR VII HUMAN, COAGULATION FACTOR IX HUMAN, COAGULATION FACTOR X HUMAN, PROTEIN C, PROTEIN S HUMAN, AND WATER 2000 UNITS: KIT at 14:53

## 2023-04-18 RX ADMIN — AMIODARONE HYDROCHLORIDE 200 MG: 200 TABLET ORAL at 22:03

## 2023-04-18 RX ADMIN — SODIUM CHLORIDE 1000 ML: 9 INJECTION, SOLUTION INTRAVENOUS at 14:30

## 2023-04-18 RX ADMIN — Medication 25 MG: at 15:49

## 2023-04-18 RX ADMIN — MIDAZOLAM 1 MG: 1 INJECTION INTRAMUSCULAR; INTRAVENOUS at 15:49

## 2023-04-18 RX ADMIN — ONDANSETRON 4 MG: 2 INJECTION INTRAMUSCULAR; INTRAVENOUS at 14:39

## 2023-04-18 RX ADMIN — PANTOPRAZOLE SODIUM 80 MG: 40 INJECTION, POWDER, LYOPHILIZED, FOR SOLUTION INTRAVENOUS at 14:26

## 2023-04-18 RX ADMIN — PROPOFOL 40 MCG/KG/MIN: 10 INJECTION, EMULSION INTRAVENOUS at 15:49

## 2023-04-18 RX ADMIN — SODIUM CHLORIDE 1000 ML: 9 INJECTION, SOLUTION INTRAVENOUS at 15:20

## 2023-04-18 RX ADMIN — SODIUM CHLORIDE 50 ML: 9 INJECTION, SOLUTION INTRAVENOUS at 15:05

## 2023-04-18 ASSESSMENT — PAIN DESCRIPTION - DESCRIPTORS: DESCRIPTORS: DISCOMFORT

## 2023-04-18 ASSESSMENT — PAIN - FUNCTIONAL ASSESSMENT: PAIN_FUNCTIONAL_ASSESSMENT: NONE - DENIES PAIN

## 2023-04-18 ASSESSMENT — PAIN SCALES - GENERAL: PAINLEVEL_OUTOF10: 10

## 2023-04-18 ASSESSMENT — ENCOUNTER SYMPTOMS: BLOOD IN STOOL: 1

## 2023-04-18 ASSESSMENT — PAIN DESCRIPTION - LOCATION
LOCATION: BUTTOCKS
LOCATION: BUTTOCKS

## 2023-04-18 NOTE — ANESTHESIA POSTPROCEDURE EVALUATION
Department of Anesthesiology  Postprocedure Note    Patient: Kati Wilkerson  MRN: [id number]  YOB: 1965  Date of evaluation: 4/18/2023      Procedure Summary     Date: 04/18/23 Room / Location: [address] [place] [address] - INPATIENT    Anesthesia Start: 5538 Anesthesia Stop: 1605    Procedure: EGD ESOPHAGOGASTRODUODENOSCOPY (Esophagus) Diagnosis:       Varicocele      (VARICOCELE)    Surgeons: Esperanza Benavides MD Responsible Provider: Nelda Zepeda MD    Anesthesia Type: general ASA Status: 4 - Emergent          Anesthesia Type: No value filed.     Des Phase I: Des Score: 8    Des Phase II:        Anesthesia Post Evaluation    Complications: no

## 2023-04-18 NOTE — ANESTHESIA PRE PROCEDURE
EGD TRANSORAL BIOPSY SINGLE/MULTIPLE N/A 7/19/2017    EGD BIOPSY performed by Jamie Watson MD at [address] I&D [address] BURSAL SPACE Bilateral 8/22/2017    FOOT DEBRIDEMENT INCISION AND DRAINAGE with bone bx performed by Gege Roberts DPM at [address]  07/19/2017    polypectomy    UPPER GASTROINTESTINAL ENDOSCOPY N/A 3/14/2019    EGD BIOPSY performed by Derek Fermin MD at [address] N/A 5/20/2018    REPAIR AND REDUCTION OF RECURRENT INCARCERATED INCISIONAL HERNIA WITH MESH performed by Naya Bain MD at [place] [address] History:    Social History     Tobacco Use    Smoking status: Never    Smokeless tobacco: Never   Substance Use Topics    Alcohol use: No     Comment: quit drinking 2012                                Counseling given: Not Answered      Vital Signs (Current):   Vitals:    04/18/23 1442 04/18/23 1446 04/18/23 1500 04/18/23 1515   BP: (!) 152/116 (!) 100/40 94/60 (!) 90/32   Pulse: 65 64 67 64   Resp: 22 15 18 16   Temp: 98.1 °F (36.7 °C)      TempSrc: Oral      SpO2: 100% 100% 100% 100%                                              BP Readings from Last 3 Encounters:   04/18/23 (!) 90/32   04/17/23 136/67   06/08/22 (!) 122/52       NPO Status:                                                                                 BMI:   Wt Readings from Last 3 Encounters:   04/14/23 (!) 406 lb (184.2 kg)   06/08/22 (!) 350 lb (158.8 kg)   01/20/22 (!) 350 lb (158.8 kg)     There is no height or weight on file to calculate BMI.    CBC:   Lab Results   Component Value Date/Time    WBC 17.9 04/18/2023 02:08 PM    RBC 3.42 04/18/2023 02:08 PM    RBC 3.93 04/02/2012 10:04 AM    HGB 8.9 04/18/2023 02:08 PM    HCT 30.0 04/18/2023 02:08 PM    MCV 87.7 04/18/2023 02:08 PM    RDW 14.3 04/18/2023 02:08 PM     04/18/2023 02:08 PM     04/02/2012 10:04 AM       CMP:   Lab Results   Component Value

## 2023-04-18 NOTE — ED NOTES
Called blood bank and spoke with Julio Lima. At [facility]. request, blood to be sent to OR. Eliana verbalized understanding.      Hector Sexton RN  04/18/23 2502
Pt  called at patient request, no answer. VM left requesting call back to ED at patient request.   Pt states it is okay to give information to this person - Lydia Schlatter [phone]. RN called pt son María Elena at [phone] per pt request. Writer updated Staria on decision to admit and pt request for a visit. Margo denies any other questions.       Vernon Zavaleta RN  04/18/23 4171
Pt son Margo updated on transfer to OR and then assigned room 1007. Staria expressed thanks.      Oliver Pereira RN  04/18/23 5979
RN to call back for report.        Tamara Reyna RN  04/18/23 6743
100/40 94/60 (!) 90/32   Pulse: 65 64 67 64   Resp: 22 15 18 16   Temp: 98.1 °F (36.7 °C)      TempSrc: Oral      SpO2: 100% 100% 100% 100%      Oxygen Baseline 3.5 lpm via NC    Current needs required 3.5 lpm via NC     LDAs:   Peripheral IV 04/18/23 Right Wrist (Active)       Peripheral IV 04/18/23 Right Antecubital (Active)     Mobility: Fully dependent  Fall Risk:    Pending ED orders: blood transfusion -> blood sent to OR  Present condition: Critical but stable  Code Status: Unknown  Consults: IP CONSULT TO GI  IP CONSULT TO INTERNAL MEDICINE  [x]  Hospitalist  Completed  [x] yes [] no Who: Oren Mejía NP  []  Medicine  Completed  [] yes [] No Who:   []  Cardiology  Completed  [] yes [] No Who:   [x]  GI   Completed  [x] yes [] No Who: Wil Worthy  []  Neurology  Completed  [] yes [] No Who:   []  Nephrology Completed  [] yes [] No Who:    []  Vascular  Completed  [] yes [] No Who:   []  Ortho  Completed  [] yes [] No Who:     []  Surgery  Completed  [] yes [] No Who:    []  Urology  Completed  [] yes [] No Who:    []  CT Surgery Completed  [] yes [] No Who:   []  Podiatry  Completed  [] yes [] No Who:    []  Other    Completed  [] yes [] No Who:  Interventions: IV X2, labs/T&S w/ 1 unit of blood ordered, 1L bolus, Protonix gtt KVO, infusion running; KCentra infusion KVO  Important Events: Son updated,  called and VM left        Electronically signed by Vernon Zavaleta, RN on 4/18/2023 at 3:33 PM       Vernon Zavaleta [facility]  04/18/23 Eugenio Flood RN  04/18/23 2336

## 2023-04-18 NOTE — CONSULTS
obesity ([facility]     Neuropathy     feet,toes    On home oxygen therapy     DME for home oxgygen at 2.5 lpm at HS and as needed    On home oxygen therapy     pt uses 2-3L NC @ home    Pancreatitis     x 5 episodes    Poisoning by insulin and oral hypoglycemic (antidiabetic) drugs, accidental (unintentional), initial encounter 01/12/2021    Primary osteoarthritis of right knee     Retention, urine 01/24/2018    SBO (small bowel obstruction) ([facility] 05/19/2018    Septic shock ([facility]     Sleep apnea     suspected juany, never has had PSG study. HAS NO MACHINE    Sleep apnea, obstructive     pt noncompliant w/ CPAP @ home.      Thyroid disease     Under care of team     Urologist- Dr. Garrison Hernandez    Urinary bladder neurogenic dysfunction     Urinary tract infection associated with indwelling urethral catheter ([facility] 11/04/2020       SURGICAL HISTORY:  Past Surgical History:   Procedure Laterality Date    ABDOMEN SURGERY      hernia repair x4 (ventral)    COLONOSCOPY      2012    CYSTOSCOPY  01/24/2018    CYSTOSCOPY  02/20/2019    CYSTOSCOPY N/A 2/20/2019    CYSTOSCOPY DILATION performed by Betty Zuñiga MD at [address] N/A 8/23/2019    CYSTOSCOPY/ DILATION NICOLE CATHETER EXCHANGE performed by Betty Zuñiga MD at [address] N/A 6/8/2022    CYSTOSCOPY, REMOVAL OF SMALL BLADDER STONE AND BLADDER IRRIGATION performed by Betty Zuñiga MD at [facility], COLON, [address]  05/20/2018    repair and reduction of recurrent incarcerated incisional hernia with mesh    PA CYSTOURETHROSCOPY N/A 1/24/2018    CYSTOSCOPY Priyank Cornejo performed by Betty Zuñiga MD at [address] [place] [address] EGD TRANSORAL BIOPSY SINGLE/MULTIPLE N/A 7/19/2017    EGD BIOPSY performed by Tess Sanabria MD at [address] [place] [address] I&D [address] [place] [address] BURSAL SPACE Bilateral 8/22/2017    FOOT DEBRIDEMENT INCISION AND DRAINAGE with bone bx performed by Clarisse Winn DPM at [address]

## 2023-04-18 NOTE — OP NOTE
EGD NOTE      Patient:   Vonnie Garcia    :    1965    Facility:   [facility]   Referring/PCP: Char Crigler, MD    Procedure:   Esophagogastroduodenoscopy --diagnostic  Date:     2023   Endoscopist:  Radha Emery D.O. Assistant:                  None    Preoperative Diagnosis:     GIB (acute)    Postoperative Diagnosis:    Normal    Anesthesia:  MAC    Complications: None    Description of Procedure:  Informed consent was obtained after explanation of the procedure including indications, description of the procedure,  benefits and possible risks and complications of the procedure, and alternatives. Questions were answered. The patient's history was reviewed and a directed physical examination was performed prior to the procedure. Patient was monitored throughout the procedure with pulse oximetry and periodic assessment of vital signs. Patient was sedated as noted above. With the patient in the left lateral decubitus position, the Olympus videoendoscope was placed in the patient's mouth and under direct visualization passed into the esophagus. Visualization of the esophagus, stomach, and duodenum was performed during both introduction and withdrawal of the endoscope and retroflexed view of the proximal stomach was obtained. The scope was passed to the 2nd portion of the duodenum. The patient tolerated the procedure well and was taken to the recovery area in good condition. EBL: None  Specimen:  None  Implants: None      Findings[de-identified]   Esophagus: normal.   Stomach: normal  Duodenum: normal    Recommendations:   -Stop PPI drip.  -Clears. -U/S to check for ascites. -Colonoscopy tomorrow.       Electronically signed by Leti Wilson MD, D.O. on 2023 at 4:02 PM

## 2023-04-18 NOTE — ED PROVIDER NOTES
polypectomy    UPPER GASTROINTESTINAL ENDOSCOPY N/A 3/14/2019    EGD BIOPSY performed by Bernardino Lovell MD at [address] [place] [address] N/A 5/20/2018    REPAIR AND REDUCTION OF RECURRENT INCARCERATED INCISIONAL HERNIA WITH MESH performed by Jackie Duque MD at [facility]       Previous Medications    AMIODARONE (CORDARONE) 200 MG TABLET    Take 1 tablet by mouth 2 times daily    APIXABAN (ELIQUIS) 5 MG TABS TABLET    Take 1 tablet by mouth 2 times daily    ASPIRIN 81 MG EC TABLET    Take 1 tablet by mouth daily    BUMETANIDE (BUMEX) 1 MG TABLET    Take 1 tablet by mouth 2 times daily    CLOBETASOL (TEMOVATE) 0.05 % OINTMENT    Apply topically 2 times daily Apply topically 2 times daily. CLONAZEPAM (KLONOPIN) 1 MG TABLET    Take 1 tablet by mouth 2 times daily as needed for Anxiety. DOCUSATE SODIUM (COLACE) 100 MG CAPSULE    Take 1 capsule by mouth daily    DULOXETINE (CYMBALTA) 60 MG CAPSULE    Take 60 mg by mouth every morning     ESOMEPRAZOLE (NEXIUM) 40 MG CAPSULE    Take 40 mg by mouth 2 times daily     FERROUS SULFATE (FE TABS 325) 325 (65 FE) MG EC TABLET    Take 325 mg by mouth daily (with breakfast)    HYDROCORTISONE 2.5 % CREAM    Apply topically daily as needed    HYDROXYZINE (ATARAX) 25 MG TABLET    Take 25 mg by mouth every 8 hours as needed for Itching    IBUPROFEN (ADVIL;MOTRIN) 600 MG TABLET    Take 600 mg by mouth every 6 hours as needed for Pain    INSULIN GLARGINE (BASAGLAR KWIKPEN) 100 UNIT/ML INJECTION PEN    Inject 75 Units into the skin 2 times daily Indications: HOLD FOR BLOOD SUGAR BELOW 120. KETOCONAZOLE (NIZORAL) 2 % SHAMPOO    Apply topically daily as needed for Itching Apply topically daily as needed.     LACTULOSE (CHRONULAC) 10 GM/15ML SOLUTION    Take 15 mLs by mouth daily as needed (for 10 days)    LEVOTHYROXINE (SYNTHROID) 175 MCG TABLET    Take 175 mcg by mouth Daily     METOPROLOL TARTRATE (LOPRESSOR) 50 MG TABLET    Take 1

## 2023-04-18 NOTE — H&P
[facility]  Office: [address], DO, Symone Seals, DO, Vesta Alex, DO, Anju Romo Blood, DO, Richardson Gunderson MD, Zuleyka Epps MD, Glenda Crigler, MD, Chelle Johnston MD,  Carri Andrew MD, Jimi Maurer MD, Kelli Jiang, DO, Clari Monge MD,  Peri Sellers MD, Diane Hooker MD, Jesenia Lux DO, Jagdish Toney MD, Manish Bennett MD, Meme Pacheco DO, Teresa Fagan MD, Marla Brown MD, Madie Osorio MD, Laurita Harding MD,  Presley Cruz DO, Betty Huggins MD,  Johnna Mathias, CNP,  Gale Camacho, CNP, Barry Cornelius, CNP, Sukumar Mcfadden, CNP,  Allen Valladares, [facility], Carmencita Sanchez, CNP, Oswaldo Werner, CNP, Aidan Armenta, CNP, Edwar Palma, CNP, Salvador Waters, CNP, Savage Henry PA-C, Giovani Vanessa, CNS, Arnel Chavis, CNP, Daniela Santos, [facility]    HISTORY AND PHYSICAL EXAMINATION            Date:   4/18/2023  Patient name:  Zena Weaver  Date of admission:  4/18/2023  1:49 PM  MRN:   [id number]  Account:  [de-identified]  YOB: 1965  PCP:    Andrews Butler MD  Room:   5823/9706-88  Code Status:    Full Code    Chief Complaint:     Chief Complaint   Patient presents with    Rectal Bleeding       History Obtained From:     patient, electronic medical record    History of Present Illness:     Zena Weaver is a 60 y.o. Non- / non  male who presents with Rectal Bleeding   and is admitted to the hospital for the management of Acute GI bleeding. The patient was admitted from 4/10 through 4/10 with hyponatremia. According to the note the patient was started on IV fluids and his hyponatremia improved. Urology was consulted due to chronic Clemons catheter; Current catheter was okay to remain in place and he is to follow-up outpatient as previously scheduled for his next exchange.  He completed 5-day course of cefepime while hospitalized for possible UTI

## 2023-04-18 NOTE — PROGRESS NOTES
CLINICAL PHARMACY NOTE: MEDS TO BEDS    Total # of Prescriptions Filled: 4   The following medications were delivered to the patient:  Bumetanide 1 mg tabs  Eliquis 5 mg tabs  Metoprolol Tartrate 50 mg tabs  Amiodarone 200 mg tabs    Additional Documentation:     Delivered to the pt's room 4/17/23, no copay
[facility]   Urology Progress Note            Subjective: follow-up chronic retention, neurogenic bladder, indwelling Clemons    Patient Vitals for the past 24 hrs:   BP Temp Temp src Pulse Resp SpO2   04/17/23 0400 120/65 96.8 °F (36 °C) Temporal 82 14 95 %   04/17/23 0335 -- -- -- 77 18 95 %   04/17/23 0206 (!) 140/68 -- -- 90 18 92 %   04/17/23 0001 (!) 93/50 97.4 °F (36.3 °C) Temporal 90 14 94 %   04/16/23 2315 -- -- -- (!) 106 20 93 %   04/16/23 2300 128/63 -- -- 100 17 93 %   04/16/23 2200 122/67 -- -- (!) 106 17 93 %   04/16/23 2128 -- -- -- -- 22 --   04/16/23 2100 (!) 148/82 -- -- 97 23 93 %   04/16/23 2000 (!) 162/76 98.1 °F (36.7 °C) Temporal (!) 116 25 92 %   04/16/23 1930 -- -- -- (!) 109 21 92 %   04/16/23 1800 (!) 163/77 -- -- (!) 106 22 92 %   04/16/23 1700 (!) 145/75 -- -- (!) 113 22 96 %   04/16/23 1600 (!) 151/85 98.3 °F (36.8 °C) Temporal (!) 103 28 94 %   04/16/23 1500 (!) 149/80 -- -- (!) 110 21 99 %   04/16/23 1419 -- -- -- -- -- 98 %   04/16/23 1400 (!) 141/86 -- -- (!) 112 28 97 %   04/16/23 1300 (!) 156/94 -- -- (!) 106 23 97 %   04/16/23 1200 (!) 156/85 97.9 °F (36.6 °C) Temporal 97 23 94 %   04/16/23 1100 138/83 -- -- 98 20 96 %   04/16/23 1000 (!) 149/78 -- -- 96 20 96 %   04/16/23 0900 (!) 143/73 -- -- 81 28 96 %   04/16/23 0800 (!) 140/77 98.3 °F (36.8 °C) Temporal 79 16 100 %   04/16/23 0730 -- -- -- 70 21 95 %   04/16/23 0700 (!) 131/56 -- -- 68 17 --   04/16/23 0600 (!) 141/81 -- -- 74 19 97 %       Intake/Output Summary (Last 24 hours) at 4/17/2023 0516  Last data filed at 4/17/2023 0206  Gross per 24 hour   Intake 3240.68 ml   Output 6925 ml   Net -3684.32 ml       Recent Labs     04/15/23  0611 04/16/23 0317 04/17/23  0303   WBC 9.3 7.3 12.4*   HGB 9.1* 8.3* 8.6*   HCT 32.6* 27.9* 28.4*   MCV 94.2 87.5 85.8   * 119* 137*     Recent Labs     04/15/23  0611 04/16/23 0317 04/17/23  0303   * 133* 132*   K 3.4* 3.6* 3.4*   CL 91* 91*
Patient picked up for discharge by transport and off the unit 2149. IV was removed and all belongings sent with patient, including med's to beds medications. Patient education and care plan complete. Discharge instructions reviewed and patient denies any questions or current concerns. Verbalizes understanding of discharge and home medications. Rns and transport service helped move patient with maximiliano lift onto stretcher. Clemons remained in place since it is chronic. Blue packet and report given to transport service. No questions reported.
Pulmonary Critical Care Progress Note  Karen Zapata MD     Patient seen for the follow up of  Hyponatremia     Subjective:  No significant overnight events noted. He sitting up in the bedside recliner in no distress. He wore BiPAP all night last night and tolerated well. He denies any shortness of breath, cough or chest pain. He is tolerating orals. Examination:  Vitals: /68   Pulse (!) 111   Temp 97.1 °F (36.2 °C) (Temporal)   Resp 14   Ht 6' (1.829 m)   Wt (!) 406 lb (184.2 kg)   SpO2 93%   BMI 55.06 kg/m²   General appearance: alert and cooperative with exam  Neck: No JVD  Lungs: Moderate air exchange, no crackles or wheezing  Heart: regular rate and rhythm, S1, S2 normal, no gallop  Abdomen: Soft, non tender, + BS  Extremities: no cyanosis or clubbing.  Positive edema    LABs:  CBC:   Recent Labs     04/15/23  0611 04/16/23 0317 04/17/23  0303   WBC 9.3 7.3 12.4*   HGB 9.1* 8.3* 8.6*   HCT 32.6* 27.9* 28.4*   * 119* 137*     BMP:   Recent Labs     04/15/23  0611 04/16/23 0317 04/17/23  0303   * 133* 132*   K 3.4* 3.6* 3.4*   CO2 36* 35* 31   BUN 19 20 21*   CREATININE 0.95 1.12 1.29*   LABGLOM >60 >60 >60   GLUCOSE 87 115* 122*     ABG:  Lab Results   Component Value Date/Time    PHART 7.330 06/18/2014 12:08 PM    JKT0LDS 68.0 06/18/2014 12:08 PM    PO2ART 84.0 06/18/2014 12:08 PM    UPX1TYQ 34.9 06/18/2014 12:08 PM    GBE6DQW NOT REPORTED 01/17/2022 11:52 AM    O5KHBYZU 96.5 06/18/2014 12:08 PM    FIO2 36.0 04/13/2023 10:31 AM       Lab Results   Component Value Date/Time    POCPH 7.438 04/13/2023 12:36 PM    POCPCO2 73.8 04/13/2023 12:36 PM    POCPO2 77.4 04/13/2023 12:36 PM    POCHCO3 49.9 04/13/2023 12:36 PM    PBEA 22 04/13/2023 12:36 PM    GST7UXS NOT REPORTED 01/17/2022 11:52 AM    SXVR1YWZ 95 04/13/2023 12:36 PM    FIO2 36.0 04/13/2023 10:31 AM     Radiology:  X-ray chest 4/14/2023      Impression:  Chronic hypoxic/hypercapnic respiratory failure  Altered mental status/
[facility] NOTE    Room # 2033/2033-01   Name: Otilio Cuba               Reason for visit: Follow up    I visited the patient. Admit Date & Time: 4/10/2023  5:11 PM    Assessment:  Otilio Cuba is a 60 y.o. male in the hospital because he has numerous health issues. Upon entering the room the patient is on the phone initially and then is open to visit. Intervention:  I introduced myself and my title as spiritual care provider I offered space for patient  to express feelings, needs, and concerns and provided a ministry presence. Patient is sitting up in chair at bedside. Patient is concerned about his wish to go home instead of to a SNF which hospital staff suggest. [facility] staff member Harrison Resendiz arrives to talk with patient and  leaves them to talk. Outcome:  Patient anxious and venting emotions. Plan:  Chaplains will remain available to offer spiritual and emotional support as needed. Electronically signed by Berry Rousseau on 4/17/2023 at 6:56 PM.  Kenneth     04/17/23 2941   Encounter Summary   Service Provided For: Patient   Referral/Consult From: [address] [place] [address] staff   Last Encounter  04/17/23   Complexity of Encounter Low   Begin Time 1400   End Time  1420   Total Time Calculated 20 min   Encounter    Type Follow up   Spiritual/Emotional needs   Type Spiritual Support   Assessment/Intervention/Outcome   Assessment Anxious; Compromised coping; Impaired resilience; Impaired social interaction; Interrupted family processes   Intervention Active listening;Explored/Affirmed feelings, thoughts, concerns;Nurtured Hope;Sustaining Presence/Ministry of presence   Outcome Comfort; Connection/Belonging; Less anxious, Less agitated
acetaminophen **OR** acetaminophen    Data:     Past Medical History:   has a past medical history of Anxiety and depression, Back pain, chronic, BPH (benign prostatic hyperplasia), Cellulitis of left lower extremity, Cellulitis of right lower extremity, CHF (congestive heart failure) ([facility] Cirrhosis ([facility] Diabetes mellitus ([facility] Epididymoorchitis, GERD (gastroesophageal reflux disease), H/O cardiac catheterization, Hepatitis, Hiatal hernia, History of alcohol abuse, Hyperlipidemia, Hypertension, IBS (irritable bowel syndrome), Incisional hernia with obstruction but no gangrene, Klebsiella sepsis ([facility] Metabolic encephalopathy, Morbid obesity ([facility] Neuropathy, On home oxygen therapy, On home oxygen therapy, Pancreatitis, Poisoning by insulin and oral hypoglycemic (antidiabetic) drugs, accidental (unintentional), initial encounter, Primary osteoarthritis of right knee, Retention, urine, SBO (small bowel obstruction) ([facility] Septic shock ([facility] Sleep apnea, Sleep apnea, obstructive, Thyroid disease, Under care of team, Urinary bladder neurogenic dysfunction, and Urinary tract infection associated with indwelling urethral catheter ([facility] Social History:   reports that he has never smoked. He has never used smokeless tobacco. He reports that he does not drink alcohol and does not use drugs. Family History:   Family History   Adopted: Yes   Problem Relation Age of Onset    No Known Problems Mother         Adopted    No Known Problems Father         Adopted       Vitals:  /68   Pulse 96   Temp 97.1 °F (36.2 °C) (Temporal)   Resp 14   Ht 6' (1.829 m)   Wt (!) 406 lb (184.2 kg)   SpO2 95%   BMI 55.06 kg/m²   Temp (24hrs), Av.6 °F (36.4 °C), Min:96.8 °F (36 °C), Max:98.3 °F (36.8 °C)    Recent Labs     23  1206 23  1643 23  0737   POCGLU 154* 153* 188* 115*       I/O (24Hr):     Intake/Output Summary (Last 24 hours) at 2023 0842  Last data filed at 2023
varices without bleeding (HCC)     Portal hypertension (HCC)     Obesity, morbid, BMI 50 or higher (HCC)     Venous stasis dermatitis of both lower extremities     Cellulitis of perineum     Chronic indwelling Clemons catheter     Anxiety and depression     Back pain, chronic     BPH (benign prostatic hyperplasia)     Chronic diastolic congestive heart failure (HCC)     Cirrhosis (HCC)     Diabetes mellitus (HCC)     GERD (gastroesophageal reflux disease)     Hepatitis     Hiatal hernia     Hypertension     IBS (irritable bowel syndrome)     Morbid obesity (HCC)     Neuropathy     On home oxygen therapy     Sleep apnea     Thyroid disease     Urinary bladder neurogenic dysfunction     Urinary tract infection associated with indwelling urethral catheter (HCC)     Musculoskeletal immobility     Pyocystis     Myoclonic jerking     Thrombocytopenia (HCC)     Hypotension     Sepsis with acute hypercapnic respiratory failure and septic shock (HCC)     Acute kidney injury superimposed on chronic kidney disease (HCC)     Somnolence     Acute respiratory acidosis (HCC)     Lymphedema of both lower extremities     Venous stasis        Treatment Plan:   Increase lopressor to 50 mg bid  Decrease amiodarone to 200 mg bid  Will need DOAC on discharge, recommend Eliquis 5 mg bid. Change iv Bumex to po 1 mg bid   Patient was offered better rate control or NENITA/CV prior to discharge, he refused and would like to be discharged  Recommend repeat echo as OP to reassess LV/RV function. Discussed with patient and nursing.        Gloria Tse MD, [facility]

## 2023-04-19 ENCOUNTER — ANESTHESIA EVENT (OUTPATIENT)
Dept: OPERATING ROOM | Age: 58
End: 2023-04-19
Payer: MEDICARE

## 2023-04-19 ENCOUNTER — ANESTHESIA (OUTPATIENT)
Dept: OPERATING ROOM | Age: 58
End: 2023-04-19
Payer: MEDICARE

## 2023-04-19 ENCOUNTER — APPOINTMENT (OUTPATIENT)
Dept: GENERAL RADIOLOGY | Age: 58
DRG: 378 | End: 2023-04-19
Payer: MEDICARE

## 2023-04-19 PROBLEM — I48.0 PAROXYSMAL ATRIAL FIBRILLATION (HCC): Status: ACTIVE | Noted: 2023-04-18

## 2023-04-19 PROBLEM — D62 ANEMIA DUE TO ACUTE BLOOD LOSS: Status: ACTIVE | Noted: 2023-04-18

## 2023-04-19 LAB
ANION GAP SERPL CALCULATED.3IONS-SCNC: 10 MMOL/L (ref 9–17)
BUN SERPL-MCNC: 21 MG/DL (ref 6–20)
BUN/CREAT BLD: 16 (ref 9–20)
CALCIUM SERPL-MCNC: 8.4 MG/DL (ref 8.6–10.4)
CHLORIDE SERPL-SCNC: 94 MMOL/L (ref 98–107)
CO2 SERPL-SCNC: 28 MMOL/L (ref 20–31)
CREAT SERPL-MCNC: 1.33 MG/DL (ref 0.7–1.2)
EKG ATRIAL RATE: 73 BPM
EKG P AXIS: 51 DEGREES
EKG P-R INTERVAL: 166 MS
EKG Q-T INTERVAL: 490 MS
EKG QRS DURATION: 138 MS
EKG QTC CALCULATION (BAZETT): 539 MS
EKG R AXIS: -41 DEGREES
EKG T AXIS: 20 DEGREES
EKG VENTRICULAR RATE: 73 BPM
GFR SERPL CREATININE-BSD FRML MDRD: >60 ML/MIN/1.73M2
GLUCOSE BLD-MCNC: 109 MG/DL (ref 75–110)
GLUCOSE BLD-MCNC: 129 MG/DL (ref 75–110)
GLUCOSE BLD-MCNC: 129 MG/DL (ref 75–110)
GLUCOSE BLD-MCNC: 134 MG/DL (ref 75–110)
GLUCOSE BLD-MCNC: 136 MG/DL (ref 75–110)
GLUCOSE BLD-MCNC: 183 MG/DL (ref 75–110)
GLUCOSE SERPL-MCNC: 141 MG/DL (ref 70–99)
HCT VFR BLD AUTO: 21.1 % (ref 40.7–50.3)
HCT VFR BLD AUTO: 21.6 % (ref 40.7–50.3)
HCT VFR BLD AUTO: 30.4 % (ref 40.7–50.3)
HCT VFR BLD AUTO: 30.9 % (ref 40.7–50.3)
HCT VFR BLD AUTO: 31.1 % (ref 40.7–50.3)
HCT VFR BLD AUTO: 31.5 % (ref 40.7–50.3)
HGB BLD-MCNC: 6.1 G/DL (ref 13–17)
HGB BLD-MCNC: 6.3 G/DL (ref 13–17)
HGB BLD-MCNC: 9.1 G/DL (ref 13–17)
HGB BLD-MCNC: 9.2 G/DL (ref 13–17)
HGB BLD-MCNC: 9.3 G/DL (ref 13–17)
HGB BLD-MCNC: 9.5 G/DL (ref 13–17)
IRON SATURATION: 89 % (ref 20–55)
IRON SERPL-MCNC: 231 UG/DL (ref 59–158)
MCH RBC QN AUTO: 26.7 PG (ref 25.2–33.5)
MCHC RBC AUTO-ENTMCNC: 29.2 G/DL (ref 28.4–34.8)
MCV RBC AUTO: 91.3 FL (ref 82.6–102.9)
NRBC AUTOMATED: 0 PER 100 WBC
PARTIAL THROMBOPLASTIN TIME: 34.5 SEC (ref 23.9–33.8)
PDW BLD-RTO: 14.4 % (ref 11.8–14.4)
PLATELET # BLD AUTO: 129 K/UL (ref 138–453)
PMV BLD AUTO: 9.8 FL (ref 8.1–13.5)
POTASSIUM SERPL-SCNC: 4.2 MMOL/L (ref 3.7–5.3)
RBC # BLD: 3.45 M/UL (ref 4.21–5.77)
SODIUM SERPL-SCNC: 132 MMOL/L (ref 135–144)
TIBC SERPL-MCNC: 260 UG/DL (ref 250–450)
UNSATURATED IRON BINDING CAPACITY: 29 UG/DL (ref 112–347)
WBC # BLD AUTO: 15 K/UL (ref 3.5–11.3)

## 2023-04-19 PROCEDURE — 74018 RADEX ABDOMEN 1 VIEW: CPT

## 2023-04-19 PROCEDURE — 36415 COLL VENOUS BLD VENIPUNCTURE: CPT

## 2023-04-19 PROCEDURE — 3700000000 HC ANESTHESIA ATTENDED CARE: Performed by: INTERNAL MEDICINE

## 2023-04-19 PROCEDURE — 80048 BASIC METABOLIC PNL TOTAL CA: CPT

## 2023-04-19 PROCEDURE — 85014 HEMATOCRIT: CPT

## 2023-04-19 PROCEDURE — C9113 INJ PANTOPRAZOLE SODIUM, VIA: HCPCS | Performed by: NURSE PRACTITIONER

## 2023-04-19 PROCEDURE — 7100000001 HC PACU RECOVERY - ADDTL 15 MIN: Performed by: INTERNAL MEDICINE

## 2023-04-19 PROCEDURE — 36430 TRANSFUSION BLD/BLD COMPNT: CPT

## 2023-04-19 PROCEDURE — 86900 BLOOD TYPING SEROLOGIC ABO: CPT

## 2023-04-19 PROCEDURE — 2500000003 HC RX 250 WO HCPCS: Performed by: NURSE ANESTHETIST, CERTIFIED REGISTERED

## 2023-04-19 PROCEDURE — 2709999900 HC NON-CHARGEABLE SUPPLY: Performed by: INTERNAL MEDICINE

## 2023-04-19 PROCEDURE — 6370000000 HC RX 637 (ALT 250 FOR IP): Performed by: NURSE PRACTITIONER

## 2023-04-19 PROCEDURE — 85730 THROMBOPLASTIN TIME PARTIAL: CPT

## 2023-04-19 PROCEDURE — 82947 ASSAY GLUCOSE BLOOD QUANT: CPT

## 2023-04-19 PROCEDURE — 0DJD8ZZ INSPECTION OF LOWER INTESTINAL TRACT, VIA NATURAL OR ARTIFICIAL OPENING ENDOSCOPIC: ICD-10-PCS | Performed by: INTERNAL MEDICINE

## 2023-04-19 PROCEDURE — 2580000003 HC RX 258: Performed by: NURSE PRACTITIONER

## 2023-04-19 PROCEDURE — 85027 COMPLETE CBC AUTOMATED: CPT

## 2023-04-19 PROCEDURE — 2580000003 HC RX 258: Performed by: INTERNAL MEDICINE

## 2023-04-19 PROCEDURE — 3700000001 HC ADD 15 MINUTES (ANESTHESIA): Performed by: INTERNAL MEDICINE

## 2023-04-19 PROCEDURE — 2500000003 HC RX 250 WO HCPCS: Performed by: NURSE PRACTITIONER

## 2023-04-19 PROCEDURE — P9016 RBC LEUKOCYTES REDUCED: HCPCS

## 2023-04-19 PROCEDURE — 3609027000 HC COLONOSCOPY: Performed by: INTERNAL MEDICINE

## 2023-04-19 PROCEDURE — 83550 IRON BINDING TEST: CPT

## 2023-04-19 PROCEDURE — 6360000002 HC RX W HCPCS: Performed by: INTERNAL MEDICINE

## 2023-04-19 PROCEDURE — 7100000000 HC PACU RECOVERY - FIRST 15 MIN: Performed by: INTERNAL MEDICINE

## 2023-04-19 PROCEDURE — 6370000000 HC RX 637 (ALT 250 FOR IP): Performed by: INTERNAL MEDICINE

## 2023-04-19 PROCEDURE — 83540 ASSAY OF IRON: CPT

## 2023-04-19 PROCEDURE — 6360000002 HC RX W HCPCS: Performed by: NURSE ANESTHETIST, CERTIFIED REGISTERED

## 2023-04-19 PROCEDURE — 85018 HEMOGLOBIN: CPT

## 2023-04-19 PROCEDURE — 2580000003 HC RX 258: Performed by: NURSE ANESTHETIST, CERTIFIED REGISTERED

## 2023-04-19 PROCEDURE — 6360000002 HC RX W HCPCS: Performed by: NURSE PRACTITIONER

## 2023-04-19 PROCEDURE — 99232 SBSQ HOSP IP/OBS MODERATE 35: CPT | Performed by: INTERNAL MEDICINE

## 2023-04-19 PROCEDURE — 2060000000 HC ICU INTERMEDIATE R&B

## 2023-04-19 RX ORDER — KETAMINE HCL IN NACL, ISO-OSM 100MG/10ML
SYRINGE (ML) INJECTION PRN
Status: DISCONTINUED | OUTPATIENT
Start: 2023-04-19 | End: 2023-04-19 | Stop reason: SDUPTHER

## 2023-04-19 RX ORDER — PROPOFOL 10 MG/ML
INJECTION, EMULSION INTRAVENOUS CONTINUOUS PRN
Status: DISCONTINUED | OUTPATIENT
Start: 2023-04-19 | End: 2023-04-19 | Stop reason: SDUPTHER

## 2023-04-19 RX ORDER — LIDOCAINE HYDROCHLORIDE 20 MG/ML
INJECTION, SOLUTION EPIDURAL; INFILTRATION; INTRACAUDAL; PERINEURAL CONTINUOUS PRN
Status: DISCONTINUED | OUTPATIENT
Start: 2023-04-19 | End: 2023-04-19 | Stop reason: SDUPTHER

## 2023-04-19 RX ORDER — SODIUM CHLORIDE 9 MG/ML
INJECTION, SOLUTION INTRAVENOUS PRN
Status: DISCONTINUED | OUTPATIENT
Start: 2023-04-19 | End: 2023-04-19

## 2023-04-19 RX ORDER — HYDROXYZINE HYDROCHLORIDE 10 MG/1
10 TABLET, FILM COATED ORAL 3 TIMES DAILY PRN
Status: DISCONTINUED | OUTPATIENT
Start: 2023-04-19 | End: 2023-04-21 | Stop reason: HOSPADM

## 2023-04-19 RX ORDER — SODIUM CHLORIDE 9 MG/ML
INJECTION, SOLUTION INTRAVENOUS CONTINUOUS PRN
Status: DISCONTINUED | OUTPATIENT
Start: 2023-04-19 | End: 2023-04-19 | Stop reason: SDUPTHER

## 2023-04-19 RX ORDER — MIDAZOLAM HYDROCHLORIDE 1 MG/ML
INJECTION INTRAMUSCULAR; INTRAVENOUS PRN
Status: DISCONTINUED | OUTPATIENT
Start: 2023-04-19 | End: 2023-04-19 | Stop reason: SDUPTHER

## 2023-04-19 RX ADMIN — SODIUM CHLORIDE, PRESERVATIVE FREE 10 ML: 5 INJECTION INTRAVENOUS at 09:36

## 2023-04-19 RX ADMIN — PROPOFOL 75 MCG/KG/MIN: 10 INJECTION, EMULSION INTRAVENOUS at 15:15

## 2023-04-19 RX ADMIN — METOPROLOL TARTRATE 50 MG: 50 TABLET, FILM COATED ORAL at 09:35

## 2023-04-19 RX ADMIN — LIDOCAINE HYDROCHLORIDE 100 MG: 20 INJECTION, SOLUTION EPIDURAL; INFILTRATION; INTRACAUDAL; PERINEURAL at 15:15

## 2023-04-19 RX ADMIN — Medication 10 MG: at 15:24

## 2023-04-19 RX ADMIN — Medication 10 MG: at 15:19

## 2023-04-19 RX ADMIN — Medication 10 MG: at 15:31

## 2023-04-19 RX ADMIN — POLYETHYLENE GLYCOL 3350, SODIUM SULFATE ANHYDROUS, SODIUM BICARBONATE, SODIUM CHLORIDE, POTASSIUM CHLORIDE 2000 ML: 236; 22.74; 6.74; 5.86; 2.97 POWDER, FOR SOLUTION ORAL at 18:15

## 2023-04-19 RX ADMIN — CLONAZEPAM 1 MG: 0.5 TABLET ORAL at 22:54

## 2023-04-19 RX ADMIN — Medication 10 MG: at 15:15

## 2023-04-19 RX ADMIN — INSULIN GLARGINE 25 UNITS: 100 INJECTION, SOLUTION SUBCUTANEOUS at 19:58

## 2023-04-19 RX ADMIN — Medication 10 MG: at 15:28

## 2023-04-19 RX ADMIN — QUETIAPINE FUMARATE 50 MG: 50 TABLET, EXTENDED RELEASE ORAL at 19:58

## 2023-04-19 RX ADMIN — SODIUM CHLORIDE: 9 INJECTION, SOLUTION INTRAVENOUS at 15:30

## 2023-04-19 RX ADMIN — MIDAZOLAM 2 MG: 1 INJECTION INTRAMUSCULAR; INTRAVENOUS at 15:14

## 2023-04-19 RX ADMIN — SODIUM CHLORIDE: 9 INJECTION, SOLUTION INTRAVENOUS at 17:39

## 2023-04-19 RX ADMIN — CLONAZEPAM 1 MG: 0.5 TABLET ORAL at 03:04

## 2023-04-19 RX ADMIN — ONDANSETRON 4 MG: 2 INJECTION INTRAMUSCULAR; INTRAVENOUS at 11:58

## 2023-04-19 RX ADMIN — ACETAMINOPHEN 650 MG: 325 TABLET ORAL at 17:34

## 2023-04-19 RX ADMIN — ANTI-FUNGAL POWDER MICONAZOLE NITRATE TALC FREE: 1.42 POWDER TOPICAL at 11:00

## 2023-04-19 RX ADMIN — AMIODARONE HYDROCHLORIDE 200 MG: 200 TABLET ORAL at 09:35

## 2023-04-19 RX ADMIN — OXYCODONE AND ACETAMINOPHEN 1 TABLET: 5; 325 TABLET ORAL at 09:35

## 2023-04-19 RX ADMIN — ONDANSETRON 4 MG: 2 INJECTION INTRAMUSCULAR; INTRAVENOUS at 07:21

## 2023-04-19 RX ADMIN — HYDROXYZINE HYDROCHLORIDE 10 MG: 10 TABLET ORAL at 22:55

## 2023-04-19 RX ADMIN — ANTI-FUNGAL POWDER MICONAZOLE NITRATE TALC FREE: 1.42 POWDER TOPICAL at 20:01

## 2023-04-19 RX ADMIN — PANTOPRAZOLE SODIUM 40 MG: 40 INJECTION, POWDER, FOR SOLUTION INTRAVENOUS at 06:18

## 2023-04-19 RX ADMIN — ONDANSETRON 4 MG: 2 INJECTION INTRAMUSCULAR; INTRAVENOUS at 17:37

## 2023-04-19 RX ADMIN — POLYETHYLENE GLYCOL-3350 AND ELECTROLYTES 2000 ML: 236; 6.74; 5.86; 2.97; 22.74 POWDER, FOR SOLUTION ORAL at 06:52

## 2023-04-19 RX ADMIN — HYDROXYZINE HYDROCHLORIDE 10 MG: 10 TABLET ORAL at 09:35

## 2023-04-19 RX ADMIN — CLONAZEPAM 1 MG: 0.5 TABLET ORAL at 12:39

## 2023-04-19 RX ADMIN — SODIUM CHLORIDE: 9 INJECTION, SOLUTION INTRAVENOUS at 15:13

## 2023-04-19 ASSESSMENT — PAIN DESCRIPTION - DESCRIPTORS: DESCRIPTORS: DISCOMFORT

## 2023-04-19 ASSESSMENT — PAIN DESCRIPTION - LOCATION
LOCATION: BUTTOCKS
LOCATION: HEAD

## 2023-04-19 ASSESSMENT — PAIN - FUNCTIONAL ASSESSMENT: PAIN_FUNCTIONAL_ASSESSMENT: PREVENTS OR INTERFERES SOME ACTIVE ACTIVITIES AND ADLS

## 2023-04-19 ASSESSMENT — PAIN SCALES - GENERAL
PAINLEVEL_OUTOF10: 6
PAINLEVEL_OUTOF10: 8

## 2023-04-19 ASSESSMENT — PAIN DESCRIPTION - PAIN TYPE: TYPE: CHRONIC PAIN

## 2023-04-19 ASSESSMENT — PAIN DESCRIPTION - FREQUENCY: FREQUENCY: INTERMITTENT

## 2023-04-19 ASSESSMENT — PAIN DESCRIPTION - ORIENTATION: ORIENTATION: RIGHT;LEFT

## 2023-04-19 ASSESSMENT — PAIN DESCRIPTION - ONSET: ONSET: ON-GOING

## 2023-04-19 NOTE — ANESTHESIA PRE PROCEDURE
YHI6IEQ 68.0 06/18/2014 12:08 PM    FQJ7QEM 34.9 06/18/2014 12:08 PM    Y3HQBPCX 96.5 06/18/2014 12:08 PM        Type & Screen (If Applicable):  No results found for: LABABO, LABRH    Drug/Infectious Status (If Applicable):  Lab Results   Component Value Date/Time    HEPCAB NONREACTIVE 06/19/2014 05:57 PM       COVID-19 Screening (If Applicable):   Lab Results   Component Value Date/Time    COVID19 Not Detected 04/13/2023 10:14 AM           Anesthesia Evaluation   no history of anesthetic complications:   Airway: Mallampati: IV  TM distance: >3 FB   Neck ROM: limited  Mouth opening: < 3 FB   Dental:          Pulmonary:   (+) sleep apnea:                            ROS comment: Chronic resp failure 2.5L/min O2   Cardiovascular:  Exercise tolerance: no interval change,   (+) hypertension:, dysrhythmias (\"record indicated a-fib, patient denies\"):, CHF:,       ECG reviewed               Beta Blocker:  Dose within 24 Hrs         Neuro/Psych:   (+) depression/anxiety             GI/Hepatic/Renal:   (+) GERD:, liver disease: portal hypertension, esophageal varices,          ROS comment: Acute GIB. Endo/Other:    (+) Diabetes, hypothyroidism, blood dyscrasia (s/p KCENTRA and 2uPRBC): anemia and anticoagulation therapy:., .                  ROS comment: Chronic ulceration of back and sacrum Abdominal:             Vascular:     - PVD. Other Findings:             Anesthesia Plan      general     ASA 4       Induction: intravenous. MIPS: prophylactic pharmacologic antiemetic agents not administered perioperatively for documented reasons. Anesthetic plan and risks discussed with patient. Plan discussed with CRNA.     Attending anesthesiologist reviewed and agrees with Laura Hope MD   4/19/2023

## 2023-04-19 NOTE — OP NOTE
COLONOSCOPY NOTE      Patient:   Lc Benjamin    :    1965    Referring/PCP: Kristina Ayala MD  Facility:  [address] [place] [address]  Procedure:   Colonoscopy --diagnostic  Date:     2023  Endoscopist:  Luis Alberto Rogers DO  Assistant:                    None    Preoperative Diagnosis:    Acute GIB    Postoperative Diagnosis:    Poor prep with blood/clots. Anesthesia: MAC    Complications:  None    Description of Procedure:  Informed consent was obtained after explanation of the procedure including indications, description of the procedure,  benefits and possible risks and complications of the procedure, and alternatives. Questions were answered. The patient's history was reviewed and a directed physical examination was performed prior to the procedure. Patient was monitored throughout the procedure with pulse oximetry and periodic assessment of vital signs. Patient was sedated as noted above. With the patient initially in the left lateral decubitus position, a digital rectal examination was performed. The Olympus video colonoscope was placed in the patient's rectum and advanced without difficulty  to the rectum. The prep was poor. Examination of the mucosa was performed during both introduction and withdrawal of the colonoscope. The patient  was taken to the recovery area in good condition. EBL: none  Specimen: none  Implants: none  Prep: Golytely  W/D time: N/A. Findings:     Large amount of stool with fresh blood along with old blood clots within rectum. Unable to advance beyond 20 cm in view of impacted stool and clots. Recommendations:   Reschedule colonoscopy for tomorrow with aggressive bowel prep. Monitor Hb/Hct. Blood products as needed. Clears liquid diet.           Electronically signed by Zac Miller MD, D.O on 2023 at 3:43 PM

## 2023-04-19 NOTE — ANESTHESIA POSTPROCEDURE EVALUATION
Department of Anesthesiology  Postprocedure Note    Patient: Lc Benjamin  MRN: [id number]  YOB: 1965  Date of evaluation: 4/19/2023      Procedure Summary     Date: 04/19/23 Room / Location: [address] - INPATIENT    Anesthesia Start: 3029 Anesthesia Stop: 2422    Procedure: COLONOSCOPY DIAGNOSTIC (Abdomen/Perineum) Diagnosis:       Rectal bleeding      (RECTAL BLEEDING)    Surgeons: Zac Miller MD Responsible Provider: Anupam Moise MD    Anesthesia Type: general ASA Status: 4          Anesthesia Type: No value filed. Des Phase I: Des Score: 9    Des Phase II:        Anesthesia Post Evaluation    Patient location during evaluation: PACU  Patient participation: complete - patient participated  Level of consciousness: awake  Airway patency: patent  Nausea & Vomiting: no nausea and no vomiting  Complications: no  Cardiovascular status: hemodynamically stable  Respiratory status: acceptable  Hydration status: stable  There was medical reason for not using a multimodal analgesia pain management approach.

## 2023-04-19 NOTE — DISCHARGE INSTR - COC
cannula. Ventilator:    - No ventilator support    Rehab Therapies: Physical Therapy and Occupational Therapy  Weight Bearing Status/Restrictions: No weight bearing restrictions  Other Medical Equipment (for information only, NOT a DME order):  wheelchair and walker  Other Treatments:   Skilled nurse assessment  Medication teaching and compliance. Insulin injections  Diabetic education  Resume home health aide services. Patient's personal belongings (please select all that are sent with patient):  None    RN SIGNATURE:  Electronically signed by Joel Rodrigues RN on 4/21/23 at 9:56 AM EDT    CASE MANAGEMENT/SOCIAL WORK SECTION    Inpatient Status Date: 4/18    Readmission Risk Assessment Score:  Readmission Risk              Risk of Unplanned Readmission:  27           Discharging to Facility/ Agency   Name: Farnaz Worthington  Address:  [facility]:[phone]  [address] [place] [address]        Phone [phone]        Fax [phone]      / signature: Electronically signed by Katey Harris on 4/19/23 at 3:09 PM EDT    PHYSICIAN SECTION    Prognosis: Fair    Condition at Discharge: Stable    Rehab Potential (if transferring to Rehab): Fair    Recommended Labs or Other Treatments After Discharge: pt/ot  Monitor for recurrent bleeding    Physician Certification: I certify the above information and transfer of Becky Archuleta  is necessary for the continuing treatment of the diagnosis listed and that he requires [address] for greater 30 days.      Update Admission H&P: No change in H&P    PHYSICIAN SIGNATURE:  Electronically signed by Jolie Anderson DO on 4/20/23 at 6:21 PM EDT

## 2023-04-19 NOTE — CONSENT
Informed Consent for Blood Component Transfusion Note    I have discussed with the patient the rationale for blood component transfusion; its benefits in treating or preventing fatigue, organ damage, or death; and its risk which includes mild transfusion reactions, rare risk of blood borne infection, or more serious but rare reactions. I have discussed the alternatives to transfusion, including the risk and consequences of not receiving transfusion. The patient had an opportunity to ask questions and had agreed to proceed with transfusion of blood components.     Electronically signed by SUNSHINE Oconnell CNP on 4/18/23 at 8:24 PM EDT

## 2023-04-20 ENCOUNTER — ANESTHESIA (OUTPATIENT)
Dept: OPERATING ROOM | Age: 58
End: 2023-04-20
Payer: MEDICARE

## 2023-04-20 LAB
ABO/RH: NORMAL
ANTIBODY SCREEN: NEGATIVE
ARM BAND NUMBER: NORMAL
BLD PROD TYP BPU: NORMAL
BPU ID: NORMAL
CROSSMATCH RESULT: NORMAL
DISPENSE STATUS BLOOD BANK: NORMAL
EXPIRATION DATE: NORMAL
GLUCOSE BLD-MCNC: 124 MG/DL (ref 75–110)
GLUCOSE BLD-MCNC: 127 MG/DL (ref 75–110)
GLUCOSE BLD-MCNC: 128 MG/DL (ref 75–110)
GLUCOSE BLD-MCNC: 130 MG/DL (ref 75–110)
GLUCOSE BLD-MCNC: 136 MG/DL (ref 75–110)
GLUCOSE BLD-MCNC: 138 MG/DL (ref 75–110)
GLUCOSE BLD-MCNC: 148 MG/DL (ref 75–110)
GLUCOSE BLD-MCNC: 153 MG/DL (ref 75–110)
GLUCOSE BLD-MCNC: 153 MG/DL (ref 75–110)
HCT VFR BLD AUTO: 29.9 % (ref 40.7–50.3)
HCT VFR BLD AUTO: 31.2 % (ref 40.7–50.3)
HCT VFR BLD AUTO: 31.6 % (ref 40.7–50.3)
HGB BLD-MCNC: 8.9 G/DL (ref 13–17)
HGB BLD-MCNC: 9.1 G/DL (ref 13–17)
HGB BLD-MCNC: 9.2 G/DL (ref 13–17)
TRANSFUSION STATUS: NORMAL
UNIT DIVISION: 0

## 2023-04-20 PROCEDURE — 36415 COLL VENOUS BLD VENIPUNCTURE: CPT

## 2023-04-20 PROCEDURE — 7100000001 HC PACU RECOVERY - ADDTL 15 MIN: Performed by: INTERNAL MEDICINE

## 2023-04-20 PROCEDURE — 6360000002 HC RX W HCPCS: Performed by: NURSE ANESTHETIST, CERTIFIED REGISTERED

## 2023-04-20 PROCEDURE — 85014 HEMATOCRIT: CPT

## 2023-04-20 PROCEDURE — 6360000002 HC RX W HCPCS: Performed by: INTERNAL MEDICINE

## 2023-04-20 PROCEDURE — C9113 INJ PANTOPRAZOLE SODIUM, VIA: HCPCS | Performed by: INTERNAL MEDICINE

## 2023-04-20 PROCEDURE — 2060000000 HC ICU INTERMEDIATE R&B

## 2023-04-20 PROCEDURE — 2700000000 HC OXYGEN THERAPY PER DAY

## 2023-04-20 PROCEDURE — 51702 INSERT TEMP BLADDER CATH: CPT

## 2023-04-20 PROCEDURE — 3700000000 HC ANESTHESIA ATTENDED CARE: Performed by: INTERNAL MEDICINE

## 2023-04-20 PROCEDURE — 7100000000 HC PACU RECOVERY - FIRST 15 MIN: Performed by: INTERNAL MEDICINE

## 2023-04-20 PROCEDURE — 6370000000 HC RX 637 (ALT 250 FOR IP): Performed by: INTERNAL MEDICINE

## 2023-04-20 PROCEDURE — 2709999900 HC NON-CHARGEABLE SUPPLY: Performed by: INTERNAL MEDICINE

## 2023-04-20 PROCEDURE — 82947 ASSAY GLUCOSE BLOOD QUANT: CPT

## 2023-04-20 PROCEDURE — 2580000003 HC RX 258: Performed by: NURSE ANESTHETIST, CERTIFIED REGISTERED

## 2023-04-20 PROCEDURE — 99232 SBSQ HOSP IP/OBS MODERATE 35: CPT | Performed by: INTERNAL MEDICINE

## 2023-04-20 PROCEDURE — 85018 HEMOGLOBIN: CPT

## 2023-04-20 PROCEDURE — 2500000003 HC RX 250 WO HCPCS: Performed by: NURSE ANESTHETIST, CERTIFIED REGISTERED

## 2023-04-20 PROCEDURE — 0DJD8ZZ INSPECTION OF LOWER INTESTINAL TRACT, VIA NATURAL OR ARTIFICIAL OPENING ENDOSCOPIC: ICD-10-PCS | Performed by: INTERNAL MEDICINE

## 2023-04-20 PROCEDURE — 2580000003 HC RX 258: Performed by: INTERNAL MEDICINE

## 2023-04-20 PROCEDURE — 3609027000 HC COLONOSCOPY: Performed by: INTERNAL MEDICINE

## 2023-04-20 PROCEDURE — 3700000001 HC ADD 15 MINUTES (ANESTHESIA): Performed by: INTERNAL MEDICINE

## 2023-04-20 RX ORDER — KETAMINE HCL IN NACL, ISO-OSM 100MG/10ML
SYRINGE (ML) INJECTION PRN
Status: DISCONTINUED | OUTPATIENT
Start: 2023-04-20 | End: 2023-04-20 | Stop reason: SDUPTHER

## 2023-04-20 RX ORDER — INSULIN LISPRO 100 [IU]/ML
0-8 INJECTION, SOLUTION INTRAVENOUS; SUBCUTANEOUS
Status: DISCONTINUED | OUTPATIENT
Start: 2023-04-20 | End: 2023-04-21 | Stop reason: HOSPADM

## 2023-04-20 RX ORDER — INSULIN LISPRO 100 [IU]/ML
0-4 INJECTION, SOLUTION INTRAVENOUS; SUBCUTANEOUS NIGHTLY
Status: DISCONTINUED | OUTPATIENT
Start: 2023-04-20 | End: 2023-04-21 | Stop reason: HOSPADM

## 2023-04-20 RX ORDER — LIDOCAINE HYDROCHLORIDE 20 MG/ML
INJECTION, SOLUTION INFILTRATION; PERINEURAL PRN
Status: DISCONTINUED | OUTPATIENT
Start: 2023-04-20 | End: 2023-04-20 | Stop reason: SDUPTHER

## 2023-04-20 RX ORDER — SODIUM CHLORIDE, SODIUM LACTATE, POTASSIUM CHLORIDE, CALCIUM CHLORIDE 600; 310; 30; 20 MG/100ML; MG/100ML; MG/100ML; MG/100ML
INJECTION, SOLUTION INTRAVENOUS CONTINUOUS PRN
Status: DISCONTINUED | OUTPATIENT
Start: 2023-04-20 | End: 2023-04-20 | Stop reason: SDUPTHER

## 2023-04-20 RX ORDER — PROPOFOL 10 MG/ML
INJECTION, EMULSION INTRAVENOUS PRN
Status: DISCONTINUED | OUTPATIENT
Start: 2023-04-20 | End: 2023-04-20 | Stop reason: SDUPTHER

## 2023-04-20 RX ADMIN — OXYCODONE AND ACETAMINOPHEN 1 TABLET: 5; 325 TABLET ORAL at 18:09

## 2023-04-20 RX ADMIN — LEVOTHYROXINE SODIUM 175 MCG: 150 TABLET ORAL at 06:00

## 2023-04-20 RX ADMIN — QUETIAPINE FUMARATE 50 MG: 50 TABLET, EXTENDED RELEASE ORAL at 21:35

## 2023-04-20 RX ADMIN — PROPOFOL 20 MG: 10 INJECTION, EMULSION INTRAVENOUS at 14:59

## 2023-04-20 RX ADMIN — METOPROLOL TARTRATE 50 MG: 50 TABLET, FILM COATED ORAL at 21:35

## 2023-04-20 RX ADMIN — CLONAZEPAM 1 MG: 0.5 TABLET ORAL at 23:44

## 2023-04-20 RX ADMIN — ANTI-FUNGAL POWDER MICONAZOLE NITRATE TALC FREE: 1.42 POWDER TOPICAL at 21:34

## 2023-04-20 RX ADMIN — PROPOFOL 20 MG: 10 INJECTION, EMULSION INTRAVENOUS at 15:28

## 2023-04-20 RX ADMIN — Medication 30 MG: at 14:56

## 2023-04-20 RX ADMIN — SODIUM CHLORIDE, PRESERVATIVE FREE 10 ML: 5 INJECTION INTRAVENOUS at 21:34

## 2023-04-20 RX ADMIN — PROPOFOL 30 MG: 10 INJECTION, EMULSION INTRAVENOUS at 14:56

## 2023-04-20 RX ADMIN — ANTI-FUNGAL POWDER MICONAZOLE NITRATE TALC FREE: 1.42 POWDER TOPICAL at 08:28

## 2023-04-20 RX ADMIN — PANTOPRAZOLE SODIUM 40 MG: 40 INJECTION, POWDER, FOR SOLUTION INTRAVENOUS at 06:00

## 2023-04-20 RX ADMIN — TAMSULOSIN HYDROCHLORIDE 0.4 MG: 0.4 CAPSULE ORAL at 08:28

## 2023-04-20 RX ADMIN — PROPOFOL 20 MG: 10 INJECTION, EMULSION INTRAVENOUS at 15:23

## 2023-04-20 RX ADMIN — PROPOFOL 20 MG: 10 INJECTION, EMULSION INTRAVENOUS at 15:05

## 2023-04-20 RX ADMIN — PROPOFOL 20 MG: 10 INJECTION, EMULSION INTRAVENOUS at 15:10

## 2023-04-20 RX ADMIN — CLONAZEPAM 1 MG: 0.5 TABLET ORAL at 09:11

## 2023-04-20 RX ADMIN — PROPOFOL 20 MG: 10 INJECTION, EMULSION INTRAVENOUS at 15:18

## 2023-04-20 RX ADMIN — SODIUM CHLORIDE, POTASSIUM CHLORIDE, SODIUM LACTATE AND CALCIUM CHLORIDE: 600; 310; 30; 20 INJECTION, SOLUTION INTRAVENOUS at 14:49

## 2023-04-20 RX ADMIN — PROPOFOL 20 MG: 10 INJECTION, EMULSION INTRAVENOUS at 15:01

## 2023-04-20 RX ADMIN — POLYETHYLENE GLYCOL 3350, SODIUM SULFATE ANHYDROUS, SODIUM BICARBONATE, SODIUM CHLORIDE, POTASSIUM CHLORIDE 2000 ML: 236; 22.74; 6.74; 5.86; 2.97 POWDER, FOR SOLUTION ORAL at 07:35

## 2023-04-20 RX ADMIN — OXYCODONE AND ACETAMINOPHEN 1 TABLET: 5; 325 TABLET ORAL at 04:47

## 2023-04-20 RX ADMIN — SODIUM CHLORIDE: 9 INJECTION, SOLUTION INTRAVENOUS at 02:07

## 2023-04-20 RX ADMIN — DULOXETINE HYDROCHLORIDE 60 MG: 60 CAPSULE, DELAYED RELEASE ORAL at 08:28

## 2023-04-20 RX ADMIN — ACETAMINOPHEN 650 MG: 325 TABLET ORAL at 21:46

## 2023-04-20 RX ADMIN — FERROUS SULFATE TAB EC 325 MG (65 MG FE EQUIVALENT) 325 MG: 325 (65 FE) TABLET DELAYED RESPONSE at 08:28

## 2023-04-20 RX ADMIN — AMIODARONE HYDROCHLORIDE 200 MG: 200 TABLET ORAL at 08:28

## 2023-04-20 RX ADMIN — PROPOFOL 20 MG: 10 INJECTION, EMULSION INTRAVENOUS at 15:35

## 2023-04-20 RX ADMIN — METOPROLOL TARTRATE 50 MG: 50 TABLET, FILM COATED ORAL at 08:28

## 2023-04-20 RX ADMIN — Medication 10 MG: at 15:06

## 2023-04-20 RX ADMIN — LIDOCAINE HYDROCHLORIDE 80 MG: 20 INJECTION, SOLUTION INFILTRATION; PERINEURAL at 14:56

## 2023-04-20 RX ADMIN — SODIUM CHLORIDE: 9 INJECTION, SOLUTION INTRAVENOUS at 16:36

## 2023-04-20 RX ADMIN — Medication 10 MG: at 15:01

## 2023-04-20 RX ADMIN — PROPOFOL 20 MG: 10 INJECTION, EMULSION INTRAVENOUS at 15:14

## 2023-04-20 RX ADMIN — INSULIN GLARGINE 25 UNITS: 100 INJECTION, SOLUTION SUBCUTANEOUS at 21:35

## 2023-04-20 RX ADMIN — PROPOFOL 20 MG: 10 INJECTION, EMULSION INTRAVENOUS at 15:32

## 2023-04-20 ASSESSMENT — PAIN SCALES - GENERAL
PAINLEVEL_OUTOF10: 8
PAINLEVEL_OUTOF10: 3
PAINLEVEL_OUTOF10: 10
PAINLEVEL_OUTOF10: 0
PAINLEVEL_OUTOF10: 6
PAINLEVEL_OUTOF10: 6

## 2023-04-20 ASSESSMENT — PAIN DESCRIPTION - LOCATION
LOCATION: HEAD
LOCATION: HEAD
LOCATION: ABDOMEN
LOCATION: LEG;ABDOMEN

## 2023-04-20 ASSESSMENT — PAIN DESCRIPTION - ONSET: ONSET: ON-GOING

## 2023-04-20 ASSESSMENT — PAIN DESCRIPTION - ORIENTATION
ORIENTATION: UPPER
ORIENTATION: RIGHT;LEFT

## 2023-04-20 ASSESSMENT — PAIN DESCRIPTION - PAIN TYPE: TYPE: ACUTE PAIN

## 2023-04-20 ASSESSMENT — PAIN DESCRIPTION - DESCRIPTORS
DESCRIPTORS: CRAMPING;DISCOMFORT
DESCRIPTORS: ACHING
DESCRIPTORS: ACHING;DISCOMFORT;DULL
DESCRIPTORS: ACHING;THROBBING

## 2023-04-20 ASSESSMENT — PAIN DESCRIPTION - FREQUENCY: FREQUENCY: CONTINUOUS

## 2023-04-20 ASSESSMENT — PAIN - FUNCTIONAL ASSESSMENT: PAIN_FUNCTIONAL_ASSESSMENT: ACTIVITIES ARE NOT PREVENTED

## 2023-04-20 NOTE — OP NOTE
COLONOSCOPY NOTE      Patient:   José Jones    :    1965    Referring/PCP: Luisa Lancaster MD  Facility:  [facility]  Procedure:   Colonoscopy --diagnostic  Date:     2023  Endoscopist:  Steffany Mendoza DO  Assistant:                    None    Preoperative Diagnosis:    GIB (acute)  Acute anemia    Postoperative Diagnosis:    Redundant colon    Anesthesia: MAC    Complications:  None    Description of Procedure:  Informed consent was obtained after explanation of the procedure including indications, description of the procedure,  benefits and possible risks and complications of the procedure, and alternatives. Questions were answered. The patient's history was reviewed and a directed physical examination was performed prior to the procedure. Patient was monitored throughout the procedure with pulse oximetry and periodic assessment of vital signs. Patient was sedated as noted above. With the patient initially in the left lateral decubitus position, a digital rectal examination was performed. The Olympus video colonoscope was placed in the patient's rectum and advanced with difficulty  to the cecum, which was identified by the ileocecal valve. The prep was fair. Examination of the mucosa was performed during both introduction and withdrawal of the colonoscope. Retroflexed view of the rectum was performed. The patient  was taken to the recovery area in good condition. EBL: none  Specimen: none  Implants: none     Findings:     Extremely redundant colon. Difficult exam.  No bleeding stigmata and no evidence for blood. Recommendations:   O/P VCE to assess for small bowel source. Resume diet and meds. Please call as needed.           Electronically signed by Argelia Michelle MD, D.O on 2023 at 2:56 PM

## 2023-04-20 NOTE — ANESTHESIA POSTPROCEDURE EVALUATION
Department of Anesthesiology  Postprocedure Note    Patient: Pako Hernandez  MRN: [id number]  YOB: 1965  Date of evaluation: 4/20/2023      Procedure Summary     Date: 04/20/23 Room / Location: [place] [address]    Anesthesia Start: 4955 Anesthesia Stop: 8218    Procedure: COLONOSCOPY DIAGNOSTIC Diagnosis:       Rectal bleeding      (RECTAL BLEEDING)    Surgeons: Augusta Copeland MD Responsible Provider: Mac Jensen DO    Anesthesia Type: MAC ASA Status: 4          Anesthesia Type: No value filed.     Des Phase I: Des Score: 6    Des Phase II:        Anesthesia Post Evaluation    Patient location during evaluation: PACU  Patient participation: complete - patient participated  Level of consciousness: awake and alert  Airway patency: patent  Nausea & Vomiting: no nausea and no vomiting  Complications: no  Cardiovascular status: hemodynamically stable  Respiratory status: acceptable  Hydration status: stable

## 2023-04-20 NOTE — ANESTHESIA PRE PROCEDURE
Sleep apnea, obstructive     pt noncompliant w/ CPAP @ home.      Thyroid disease     Under care of team     Urologist- Dr. Ara Cooks Urinary bladder neurogenic dysfunction     Urinary tract infection associated with indwelling urethral catheter ([address] 11/04/2020       Past Surgical History:        Procedure Laterality Date    ABDOMEN SURGERY      hernia repair x4 (ventral)    COLONOSCOPY      2012    COLONOSCOPY N/A 4/19/2023    COLONOSCOPY DIAGNOSTIC performed by Radha Larsen MD at [address]  01/24/2018    CYSTOSCOPY  02/20/2019    CYSTOSCOPY N/A 2/20/2019    CYSTOSCOPY DILATION performed by Eagle Gibson MD at [address] 8/23/2019    CYSTOSCOPY/ DILATION NICOLE CATHETER EXCHANGE performed by Eagle Gibson MD at [address] 6/8/2022    CYSTOSCOPY, REMOVAL OF SMALL BLADDER STONE AND BLADDER IRRIGATION performed by Eagle Gibson MD at [address], COLON, DIAGNOSTIC     [address]  05/20/2018    repair and reduction of recurrent incarcerated incisional hernia with mesh    OR CYSTOURETHROSCOPY N/A 1/24/2018    CYSTOSCOPY Dasha Rodriguez performed by Eagle Gibson MD at [address] EGD TRANSORAL BIOPSY SINGLE/MULTIPLE N/A 7/19/2017    EGD BIOPSY performed by Mark Austin MD at [address] I&D [address] [facility] [address] BURSAL SPACE Bilateral 8/22/2017    FOOT DEBRIDEMENT INCISION AND DRAINAGE with bone bx performed by Sharon Mir DPM at [address]  07/19/2017    polypectomy    UPPER GASTROINTESTINAL ENDOSCOPY N/A 3/14/2019    EGD BIOPSY performed by Echo Jovel MD at [facility]    UPPER GASTROINTESTINAL ENDOSCOPY N/A 4/18/2023    EGD ESOPHAGOGASTRODUODENOSCOPY performed by Radha Larsen MD at [address] N/A 5/20/2018    REPAIR AND REDUCTION OF RECURRENT INCARCERATED INCISIONAL HERNIA WITH MESH performed by Cristiane Aviles MD at [address] [facility]

## 2023-04-20 NOTE — PROCEDURES
Indications: 53-year-old male morbidly obese with neurogenic bladder, patient has a malfunctioning Clemons catheter with bladder spasms and urinary leakage, nursing staff unable to change catheter because patient's body habitus and enlarged prostate    Procedure:  Patient lying in supine, genitalia were prepped with Betadine, the penile shaft is recessed suprapubic fat because of extreme morbid obesity    With the help of nursing staff and retracting the abdominal panniculus to adequately prep the glans penis and the penile shaft. Catheter was removed, the catheter was plugged with mucus and debris  Gel was instilled in the urethra, we decided on inserting a larger size catheter, we used a size 20 three-way catheter    The catheter was inserted in the bladder and well-tolerated, connected to drainage bag and bladder was irrigated and continuous bladder irrigation initiated for today. Patient tolerated the procedure well.     We will discontinue the bladder irrigation tomorrow

## 2023-04-20 NOTE — DISCHARGE SUMMARY
[facility]  Office: [address], DO, Elvia Shoulders, DO, Guido Clare, DO, Jack Mabry Blood, DO, Ibrahima Iglesias MD, Casimiro Brand MD, Linette Koyanagi, MD, Wally Null MD,  Yoly Jiménez MD, Diane Green MD, Edu Malloy, DO, Jessica Choi MD,  Mati Ochoa MD, Volodymyr Hdz MD, Arik Benitez, DO, Wesly Patel MD, Alberto Multani MD, Viviana Manriquez, DO, Lis Castillo MD, Luis Armando Person MD, Andrews Rodrigues MD, Ricco Mensah MD,  Jhoan Mckay, DO, Antony Beck MD,  Jonatan Echavarria, CNP,  Devin Chavis, CNP, J Luis Romero, [facility], Anne Salinas, CNP,  Vandana Goel, [facility], Irma Bailey, CNP, Viviane Mccartney, CNP, Alla Queen, CNP, Oralia Huynh, [facility], [facility], CNP, Randell Palacio PA-C, Sabine Olivier, CNS, Jacoby Aguilar, CNP, Jordyn Burgess, [facility]    Discharge Summary     Patient ID: Todd Luther  :  1965   MRN: [id number]     ACCOUNT:  [de-identified]   Patient's PCP: Faraz Kerns MD  Admit Date: 2023   Discharge Date: 2023     Length of Stay: 3  Code Status:  Full Code  Admitting Physician: Suresh Anderson DO  Discharge Physician: Suresh Anderson DO     Active Discharge Diagnoses:     Hospital Problem Lists:  Principal Problem:    Acute GI bleeding  Active Problems:    Type 2 diabetes mellitus with diabetic neuropathy, with long-term current use of insulin (HCC)    FABI (obstructive sleep apnea)    Urine retention    Gastroesophageal reflux disease without esophagitis    Obesity, morbid, BMI 50 or higher (HCC)    Chronic indwelling Clemons catheter    BPH (benign prostatic hyperplasia)    Chronic diastolic congestive heart failure (HCC)    Chronic respiratory failure with hypoxia (HCC)    Thyroid disease    Acute kidney injury superimposed on chronic kidney disease ([facility]    Lymphedema of both lower extremities    Venous stasis    Paroxysmal atrial

## 2023-04-21 VITALS
TEMPERATURE: 98.1 F | DIASTOLIC BLOOD PRESSURE: 53 MMHG | RESPIRATION RATE: 22 BRPM | SYSTOLIC BLOOD PRESSURE: 122 MMHG | HEART RATE: 120 BPM | OXYGEN SATURATION: 100 %

## 2023-04-21 LAB
GLUCOSE BLD-MCNC: 142 MG/DL (ref 75–110)
HCT VFR BLD AUTO: 32.8 % (ref 40.7–50.3)
HGB BLD-MCNC: 9.4 G/DL (ref 13–17)

## 2023-04-21 PROCEDURE — 82947 ASSAY GLUCOSE BLOOD QUANT: CPT

## 2023-04-21 PROCEDURE — C9113 INJ PANTOPRAZOLE SODIUM, VIA: HCPCS | Performed by: INTERNAL MEDICINE

## 2023-04-21 PROCEDURE — 6370000000 HC RX 637 (ALT 250 FOR IP): Performed by: INTERNAL MEDICINE

## 2023-04-21 PROCEDURE — 36415 COLL VENOUS BLD VENIPUNCTURE: CPT

## 2023-04-21 PROCEDURE — 6360000002 HC RX W HCPCS: Performed by: INTERNAL MEDICINE

## 2023-04-21 PROCEDURE — 99239 HOSP IP/OBS DSCHRG MGMT >30: CPT | Performed by: INTERNAL MEDICINE

## 2023-04-21 PROCEDURE — 85014 HEMATOCRIT: CPT

## 2023-04-21 PROCEDURE — 85018 HEMOGLOBIN: CPT

## 2023-04-21 PROCEDURE — 2700000000 HC OXYGEN THERAPY PER DAY

## 2023-04-21 PROCEDURE — 2580000003 HC RX 258: Performed by: INTERNAL MEDICINE

## 2023-04-21 RX ADMIN — LEVOTHYROXINE SODIUM 175 MCG: 150 TABLET ORAL at 05:06

## 2023-04-21 RX ADMIN — INSULIN GLARGINE 25 UNITS: 100 INJECTION, SOLUTION SUBCUTANEOUS at 09:19

## 2023-04-21 RX ADMIN — DULOXETINE HYDROCHLORIDE 60 MG: 60 CAPSULE, DELAYED RELEASE ORAL at 09:19

## 2023-04-21 RX ADMIN — SODIUM CHLORIDE, PRESERVATIVE FREE 10 ML: 5 INJECTION INTRAVENOUS at 09:20

## 2023-04-21 RX ADMIN — AMIODARONE HYDROCHLORIDE 200 MG: 200 TABLET ORAL at 09:19

## 2023-04-21 RX ADMIN — FERROUS SULFATE TAB EC 325 MG (65 MG FE EQUIVALENT) 325 MG: 325 (65 FE) TABLET DELAYED RESPONSE at 09:23

## 2023-04-21 RX ADMIN — TAMSULOSIN HYDROCHLORIDE 0.4 MG: 0.4 CAPSULE ORAL at 09:19

## 2023-04-21 RX ADMIN — OXYCODONE AND ACETAMINOPHEN 1 TABLET: 5; 325 TABLET ORAL at 05:06

## 2023-04-21 RX ADMIN — ONDANSETRON 4 MG: 2 INJECTION INTRAMUSCULAR; INTRAVENOUS at 03:09

## 2023-04-21 RX ADMIN — ANTI-FUNGAL POWDER MICONAZOLE NITRATE TALC FREE: 1.42 POWDER TOPICAL at 09:20

## 2023-04-21 RX ADMIN — METOPROLOL TARTRATE 50 MG: 50 TABLET, FILM COATED ORAL at 09:19

## 2023-04-21 RX ADMIN — PANTOPRAZOLE SODIUM 40 MG: 40 INJECTION, POWDER, FOR SOLUTION INTRAVENOUS at 05:06

## 2023-04-21 RX ADMIN — HYDROXYZINE HYDROCHLORIDE 10 MG: 10 TABLET ORAL at 09:23

## 2023-04-21 ASSESSMENT — PAIN DESCRIPTION - DESCRIPTORS: DESCRIPTORS: ACHING

## 2023-04-21 ASSESSMENT — PAIN DESCRIPTION - LOCATION: LOCATION: ABDOMEN;BACK

## 2023-04-21 ASSESSMENT — PAIN SCALES - GENERAL
PAINLEVEL_OUTOF10: 7
PAINLEVEL_OUTOF10: 2

## 2023-04-21 ASSESSMENT — PAIN SCALES - WONG BAKER: WONGBAKER_NUMERICALRESPONSE: 0

## 2023-04-21 NOTE — CARE COORDINATION
Discharge planning    Patient to go today at 1000. Perfect serve attending to see if still plan. Scope completed yesterday. Has wells. Will go home with home care. Attending will discuss eliquis resumption with GI. Patient has chronic wells. 0915  Notified Margart Riedel of discharge. Attempted to call [facility] unable to leave message as it was a generic VM.
Discharge planning    Potential for discharge home tonight IF colonoscopy is negative and uneventful recovery. Sent over face sheet, medical necessity to [facility], requested will call. Spoke with nba and she stated to call [facility] once dc order is in and they can see if can accommodate tonight or may be in am.     Elena Ivy [phone]  Call from [facility] and they are unable to transport home tonight. Set up for 1000 tomorrow.
Social work:  Writer and Yoko/RN CM talked at [employer] with patient regarding [employer]. He refuses SNF(as he has in the past). Michelle  had accepted him last admission, but did not have opportunity to open case d/t hospital admission. Patient also has HHA for 8 hours a day through [facility].
an aide 8 hrs a day 7 days a week. Phone call to [facility] [phone] and staff was providing a nurse twice a day to check blood sugars in am and give insulin and check blood sugars pm and give insulin if needed. The last time a nurse provided this service was April 10 and they no longer have nurses to service him. Explained this to pt and he firmly said I can do it myself. His oral meds are bubble packed and he takes them himself. Asked if he had any vision problems making it difficult to use a glucometer or draw up his insulin. Phone call to pt's BRITTNEY Red at [phone] and left message informing her that [facility] can no longer service pt with a nurse twice a day. Await call back. Pt expects to go home after testing and requests [id number] [facility] transport and to resume his aide with [facility] and [facility]. Denies need for anymore DME      The Plan for Transition of Care is related to the following treatment goals of Rectal bleeding [K62.5]  Acute GI bleeding [N21.2]    IF APPLICABLE: The Patient and/or patient representative Kelvin Pump and his family were provided with a choice of provider and agrees with the discharge plan. Freedom of choice list with basic dialogue that supports the patient's individualized plan of care/goals and shares the quality data associated with the providers was provided to:     Patient Representative Name:       The Patient and/or Patient Representative Agree with the Discharge Plan?       Shoshana Landaverde  Case Management Department  Ph: [phone] Fax:

## 2023-04-21 NOTE — PLAN OF CARE
PT seen resting in bed complaining of discomfort and pain. Writer and student gave pt a CHG bath w/ wells care, lotion applied all over body as pt was complaining of non-stop itching, atarax also ordered and given. Abd pannus and groin area was very red and moist micotin powder applied after sites were washed. He received 2 units of blood, hgb is now 9.3 He did complain of being dizzy and he believed it was related to anxiety, Klonopin was given. At this time pt was also feeling nauseous d/t bowel prep, IV zofran was administered. With the bowel prep pt did not have a BM, GI was consulted, and she said to proceed with the colonoscopy. Pt was repositioned for comfort via maximiliano lift. And is resting comfortably. Will continue to monitor and provide support and education as needed. Bed is locked and in the lowest position with the call light in reach. Problem: Discharge Planning  Goal: Discharge to home or other facility with appropriate resources  Outcome: Progressing     Problem: Pain  Goal: Verbalizes/displays adequate comfort level or baseline comfort level  Outcome: Progressing     Problem: Skin/Tissue Integrity  Goal: Absence of new skin breakdown  Description: 1. Monitor for areas of redness and/or skin breakdown  2. Assess vascular access sites hourly  3. Every 4-6 hours minimum:  Change oxygen saturation probe site  4. Every 4-6 hours:  If on nasal continuous positive airway pressure, respiratory therapy assess nares and determine need for appliance change or resting period.   Outcome: Progressing     Problem: ABCDS Injury Assessment  Goal: Absence of physical injury  Outcome: Progressing
Problem: Safety - Adult  Goal: Free from fall injury  4/20/2023 1131 by Alia Mckeon RN  Outcome: Progressing  4/20/2023 0509 by Kenya Santoyo RN  Outcome: Progressing     Problem: Chronic Conditions and Co-morbidities  Goal: Patient's chronic conditions and co-morbidity symptoms are monitored and maintained or improved  4/20/2023 1131 by Alia Mckeon RN  Outcome: Progressing  Flowsheets (Taken 4/20/2023 0828)  Care Plan - Patient's Chronic Conditions and Co-Morbidity Symptoms are Monitored and Maintained or Improved: Monitor and assess patient's chronic conditions and comorbid symptoms for stability, deterioration, or improvement  4/20/2023 0509 by Kenya Santoyo RN  Outcome: Progressing     Problem: Discharge Planning  Goal: Discharge to home or other facility with appropriate resources  4/20/2023 1131 by Alia Mckeon RN  Outcome: Progressing  Flowsheets (Taken 4/20/2023 1287)  Discharge to home or other facility with appropriate resources: Identify barriers to discharge with patient and caregiver  4/20/2023 0509 by Kenya Santoyo RN  Outcome: Progressing     Problem: Pain  Goal: Verbalizes/displays adequate comfort level or baseline comfort level  4/20/2023 1131 by Alia Mckeon RN  Outcome: Progressing  4/20/2023 0509 by Kenya Santoyo RN  Outcome: Progressing     Problem: Skin/Tissue Integrity  Goal: Absence of new skin breakdown  Description: 1. Monitor for areas of redness and/or skin breakdown  2. Assess vascular access sites hourly  3. Every 4-6 hours minimum:  Change oxygen saturation probe site  4. Every 4-6 hours:  If on nasal continuous positive airway pressure, respiratory therapy assess nares and determine need for appliance change or resting period.   4/20/2023 1131 by Alia Mckeon RN  Outcome: Progressing  4/20/2023 0510 by Kenya Snatoyo RN  Outcome: Progressing     Problem: ABCDS Injury Assessment  Goal: Absence of physical injury  4/20/2023 1131 by Alia Mckeon RN  Outcome:
Problem: Safety - Adult  Goal: Free from fall injury  Outcome: Progressing     Problem: Pain  Goal: Verbalizes/displays adequate comfort level or baseline comfort level  Outcome: Progressing     Problem: Skin/Tissue Integrity  Goal: Absence of new skin breakdown  Description: 1.   Monitor for areas of redness and/or skin breakdown  Outcome: Progressing
Pt has had a restless night, he is a/o x4, on 3.5L oxygen at baseline and NSR. He has been working on the GoLYTELY bowel prep throughout the night as well as receiving a tap water enema. He received PRN percocet for pain once this shift. Hgb is 8.9 this morning, NP has been notified of hgb trending down. Vitals have been stable and pt has remained free from falls or injury this shift. All needs have been met and safety maintained. Problem: Safety - Adult  Goal: Free from fall injury  Outcome: Progressing     Problem: Chronic Conditions and Co-morbidities  Goal: Patient's chronic conditions and co-morbidity symptoms are monitored and maintained or improved  Outcome: Progressing     Problem: Pain  Goal: Verbalizes/displays adequate comfort level or baseline comfort level  Outcome: Progressing     Problem: Skin/Tissue Integrity  Goal: Absence of new skin breakdown  Description: 1. Monitor for areas of redness and/or skin breakdown  2. Assess vascular access sites hourly  3. Every 4-6 hours minimum:  Change oxygen saturation probe site  4. Every 4-6 hours:  If on nasal continuous positive airway pressure, respiratory therapy assess nares and determine need for appliance change or resting period.   Outcome: Progressing     Problem: ABCDS Injury Assessment  Goal: Absence of physical injury  Outcome: Progressing
Pt is A&O x4 and has been c/o abdominal pain. Pt received PRN Zofran and PRN percocet along with scheduled Protonix. Pt has a wells that has been draining yellow/clear urine. All questions and concerns addressed. Bed locked in lowest position and call light in reach. Problem: Safety - Adult  Goal: Free from fall injury  Outcome: Progressing     Problem: Chronic Conditions and Co-morbidities  Goal: Patient's chronic conditions and co-morbidity symptoms are monitored and maintained or improved  Outcome: Progressing     Problem: Discharge Planning  Goal: Discharge to home or other facility with appropriate resources  Outcome: Progressing     Problem: Pain  Goal: Verbalizes/displays adequate comfort level or baseline comfort level  Outcome: Progressing     Problem: Skin/Tissue Integrity  Goal: Absence of new skin breakdown  Description: 1. Monitor for areas of redness and/or skin breakdown  2. Assess vascular access sites hourly  3. Every 4-6 hours minimum:  Change oxygen saturation probe site  4. Every 4-6 hours:  If on nasal continuous positive airway pressure, respiratory therapy assess nares and determine need for appliance change or resting period.   Outcome: Progressing     Problem: ABCDS Injury Assessment  Goal: Absence of physical injury  Outcome: Progressing     Problem: Neurosensory - Adult  Goal: Achieves stable or improved neurological status  Outcome: Progressing     Problem: Respiratory - Adult  Goal: Achieves optimal ventilation and oxygenation  Outcome: Progressing     Problem: Skin/Tissue Integrity - Adult  Goal: Skin integrity remains intact  Outcome: Progressing     Problem: Musculoskeletal - Adult  Goal: Return mobility to safest level of function  Outcome: Progressing     Problem: Genitourinary - Adult  Goal: Absence of urinary retention  Outcome: Progressing     Problem: Infection - Adult  Goal: Absence of infection at discharge  Outcome: Progressing     Problem: Metabolic/Fluid and
discharge: Assess and monitor for signs and symptoms of infection  4/21/2023 0502 by Baylee Maradiaga RN  Outcome: Progressing     Problem: Metabolic/Fluid and Electrolytes - Adult  Goal: Electrolytes maintained within normal limits  4/21/2023 1047 by Kayla Salazar RN  Outcome: Adequate for Discharge  Flowsheets (Taken 4/21/2023 0930)  Electrolytes maintained within normal limits: Monitor labs and assess patient for signs and symptoms of electrolyte imbalances  4/21/2023 0502 by Baylee aMradiaga RN  Outcome: Progressing     Problem: Cardiovascular - Adult  Goal: Maintains optimal cardiac output and hemodynamic stability  4/21/2023 1047 by Kayla Salazar RN  Outcome: Adequate for Discharge  4/21/2023 0502 by Baylee Maradiaga RN  Outcome: Progressing

## 2023-04-24 ENCOUNTER — APPOINTMENT (OUTPATIENT)
Dept: CT IMAGING | Age: 58
End: 2023-04-24
Payer: MEDICARE

## 2023-04-24 ENCOUNTER — APPOINTMENT (OUTPATIENT)
Dept: GENERAL RADIOLOGY | Age: 58
End: 2023-04-24
Payer: MEDICARE

## 2023-04-24 ENCOUNTER — HOSPITAL ENCOUNTER (OUTPATIENT)
Age: 58
Setting detail: OBSERVATION
Discharge: HOME HEALTH | End: 2023-04-27
Attending: EMERGENCY MEDICINE | Admitting: INTERNAL MEDICINE
Payer: MEDICARE

## 2023-04-24 DIAGNOSIS — G93.40 ACUTE ENCEPHALOPATHY: Primary | ICD-10-CM

## 2023-04-24 DIAGNOSIS — J96.02 ACUTE RESPIRATORY ACIDOSIS (HCC): ICD-10-CM

## 2023-04-24 DIAGNOSIS — J96.11 CHRONIC RESPIRATORY FAILURE WITH HYPOXIA (HCC): ICD-10-CM

## 2023-04-24 PROBLEM — R41.82 AMS (ALTERED MENTAL STATUS): Status: ACTIVE | Noted: 2023-04-24

## 2023-04-24 LAB
ABSOLUTE EOS #: 0.3 K/UL (ref 0–0.4)
ABSOLUTE IMMATURE GRANULOCYTE: 0.1 K/UL (ref 0–0.3)
ABSOLUTE LYMPH #: 0.59 K/UL (ref 1–4.8)
ABSOLUTE MONO #: 0.69 K/UL (ref 0.2–0.8)
ACETAMINOPHEN LEVEL: <10 UG/ML (ref 10–30)
ALBUMIN SERPL-MCNC: 3.4 G/DL (ref 3.5–5.2)
ALLEN TEST: POSITIVE
ALP SERPL-CCNC: 210 U/L (ref 40–129)
ALT SERPL-CCNC: 16 U/L (ref 5–41)
AMMONIA PLAS-SCNC: 53 UMOL/L (ref 16–60)
AMPHETAMINE SCREEN URINE: NEGATIVE
ANION GAP SERPL CALCULATED.3IONS-SCNC: 5 MMOL/L (ref 9–17)
AST SERPL-CCNC: 12 U/L
BARBITURATE SCREEN URINE: NEGATIVE
BASOPHILS # BLD: 1 %
BASOPHILS ABSOLUTE: 0.1 K/UL (ref 0–0.2)
BENZODIAZEPINE SCREEN, URINE: NEGATIVE
BILIRUB DIRECT SERPL-MCNC: 0.2 MG/DL
BILIRUB INDIRECT SERPL-MCNC: 0.4 MG/DL (ref 0–1)
BILIRUB SERPL-MCNC: 0.6 MG/DL (ref 0.3–1.2)
BILIRUBIN URINE: NEGATIVE
BNP SERPL-MCNC: 2801 PG/ML
BUN SERPL-MCNC: 10 MG/DL (ref 6–20)
BUN/CREAT BLD: 10 (ref 9–20)
CALCIUM SERPL-MCNC: 9.1 MG/DL (ref 8.6–10.4)
CANNABINOID SCREEN URINE: NEGATIVE
CHLORIDE SERPL-SCNC: 90 MMOL/L (ref 98–107)
CO2 SERPL-SCNC: 41 MMOL/L (ref 20–31)
COCAINE METABOLITE, URINE: NEGATIVE
COLOR: YELLOW
CREAT SERPL-MCNC: 1.05 MG/DL (ref 0.7–1.2)
EOSINOPHILS RELATIVE PERCENT: 3 % (ref 1–4)
EPITHELIAL CELLS UA: ABNORMAL /HPF (ref 0–5)
ETHANOL PERCENT: <0.01 %
ETHANOL: <10 MG/DL
FENTANYL URINE: NEGATIVE
FIO2: 32
GFR SERPL CREATININE-BSD FRML MDRD: >60 ML/MIN/1.73M2
GLUCOSE BLD-MCNC: 129 MG/DL (ref 75–110)
GLUCOSE BLD-MCNC: 136 MG/DL (ref 75–110)
GLUCOSE BLD-MCNC: 154 MG/DL (ref 75–110)
GLUCOSE BLD-MCNC: 199 MG/DL (ref 75–110)
GLUCOSE SERPL-MCNC: 165 MG/DL (ref 70–99)
GLUCOSE UR STRIP.AUTO-MCNC: NEGATIVE MG/DL
HCT VFR BLD AUTO: 33.7 % (ref 40.7–50.3)
HGB BLD-MCNC: 9.7 G/DL (ref 13–17)
IMMATURE GRANULOCYTES: 1 %
KETONES UR STRIP.AUTO-MCNC: NEGATIVE MG/DL
LACTIC ACID, SEPSIS: 1.3 MMOL/L (ref 0.5–1.9)
LACTIC ACID, SEPSIS: 1.5 MMOL/L (ref 0.5–1.9)
LEUKOCYTE ESTERASE UR QL STRIP.AUTO: NEGATIVE
LYMPHOCYTES # BLD: 6 % (ref 24–44)
MCH RBC QN AUTO: 27.4 PG (ref 25.2–33.5)
MCHC RBC AUTO-ENTMCNC: 28.8 G/DL (ref 28.4–34.8)
MCV RBC AUTO: 95.2 FL (ref 82.6–102.9)
METHADONE SCREEN, URINE: NEGATIVE
MONOCYTES # BLD: 7 % (ref 1–7)
NITRITE UR QL STRIP.AUTO: NEGATIVE
NRBC AUTOMATED: 0 PER 100 WBC
O2 DEVICE/FLOW/%: ABNORMAL
OPIATES, URINE: NEGATIVE
OXYCODONE SCREEN URINE: POSITIVE
PDW BLD-RTO: 14.2 % (ref 11.8–14.4)
PHENCYCLIDINE, URINE: NEGATIVE
PLATELET # BLD AUTO: 184 K/UL (ref 138–453)
PMV BLD AUTO: 9.9 FL (ref 8.1–13.5)
POC HCO3: 44 MMOL/L (ref 21–28)
POC O2 SATURATION: 97 % (ref 94–98)
POC PCO2: 69.3 MM HG (ref 35–48)
POC PH: 7.41 (ref 7.35–7.45)
POC PO2: 100.1 MM HG (ref 83–108)
POSITIVE BASE EXCESS, ART: 16 (ref 0–3)
POTASSIUM SERPL-SCNC: 4.3 MMOL/L (ref 3.7–5.3)
PROT SERPL-MCNC: 7.5 G/DL (ref 6.4–8.3)
PROT UR STRIP.AUTO-MCNC: 5.5 MG/DL (ref 5–8)
PROT UR STRIP.AUTO-MCNC: NEGATIVE MG/DL
RBC # BLD: 3.54 M/UL (ref 4.21–5.77)
RBC CLUMPS #/AREA URNS AUTO: ABNORMAL /HPF (ref 0–2)
SALICYLATE LEVEL: <1 MG/DL (ref 3–10)
SAMPLE SITE: ABNORMAL
SARS-COV-2 RDRP RESP QL NAA+PROBE: NOT DETECTED
SEG NEUTROPHILS: 82 % (ref 36–66)
SEGMENTED NEUTROPHILS ABSOLUTE COUNT: 8.12 K/UL (ref 1.8–7.7)
SODIUM SERPL-SCNC: 136 MMOL/L (ref 135–144)
SPECIFIC GRAVITY UA: 1.01 (ref 1–1.03)
SPECIMEN DESCRIPTION: NORMAL
TEST INFORMATION: ABNORMAL
TROPONIN I SERPL DL<=0.01 NG/ML-MCNC: 29 NG/L (ref 0–22)
TURBIDITY: ABNORMAL
URINE HGB: ABNORMAL
UROBILINOGEN, URINE: NORMAL
WBC # BLD AUTO: 9.9 K/UL (ref 3.5–11.3)
WBC UA: ABNORMAL /HPF (ref 0–5)

## 2023-04-24 PROCEDURE — 36415 COLL VENOUS BLD VENIPUNCTURE: CPT

## 2023-04-24 PROCEDURE — 83036 HEMOGLOBIN GLYCOSYLATED A1C: CPT

## 2023-04-24 PROCEDURE — 2700000000 HC OXYGEN THERAPY PER DAY

## 2023-04-24 PROCEDURE — 80143 DRUG ASSAY ACETAMINOPHEN: CPT

## 2023-04-24 PROCEDURE — 71045 X-RAY EXAM CHEST 1 VIEW: CPT

## 2023-04-24 PROCEDURE — 84484 ASSAY OF TROPONIN QUANT: CPT

## 2023-04-24 PROCEDURE — 6370000000 HC RX 637 (ALT 250 FOR IP): Performed by: NURSE PRACTITIONER

## 2023-04-24 PROCEDURE — 2580000003 HC RX 258: Performed by: NURSE PRACTITIONER

## 2023-04-24 PROCEDURE — G0480 DRUG TEST DEF 1-7 CLASSES: HCPCS

## 2023-04-24 PROCEDURE — 85025 COMPLETE CBC W/AUTO DIFF WBC: CPT

## 2023-04-24 PROCEDURE — 96361 HYDRATE IV INFUSION ADD-ON: CPT

## 2023-04-24 PROCEDURE — G0378 HOSPITAL OBSERVATION PER HR: HCPCS

## 2023-04-24 PROCEDURE — 2580000003 HC RX 258: Performed by: EMERGENCY MEDICINE

## 2023-04-24 PROCEDURE — 80307 DRUG TEST PRSMV CHEM ANLYZR: CPT

## 2023-04-24 PROCEDURE — 80179 DRUG ASSAY SALICYLATE: CPT

## 2023-04-24 PROCEDURE — 6360000002 HC RX W HCPCS: Performed by: EMERGENCY MEDICINE

## 2023-04-24 PROCEDURE — 87040 BLOOD CULTURE FOR BACTERIA: CPT

## 2023-04-24 PROCEDURE — 83880 ASSAY OF NATRIURETIC PEPTIDE: CPT

## 2023-04-24 PROCEDURE — 96375 TX/PRO/DX INJ NEW DRUG ADDON: CPT

## 2023-04-24 PROCEDURE — 82140 ASSAY OF AMMONIA: CPT

## 2023-04-24 PROCEDURE — 83605 ASSAY OF LACTIC ACID: CPT

## 2023-04-24 PROCEDURE — 99285 EMERGENCY DEPT VISIT HI MDM: CPT

## 2023-04-24 PROCEDURE — 2500000003 HC RX 250 WO HCPCS: Performed by: NURSE PRACTITIONER

## 2023-04-24 PROCEDURE — 80076 HEPATIC FUNCTION PANEL: CPT

## 2023-04-24 PROCEDURE — 96374 THER/PROPH/DIAG INJ IV PUSH: CPT

## 2023-04-24 PROCEDURE — 81001 URINALYSIS AUTO W/SCOPE: CPT

## 2023-04-24 PROCEDURE — 82947 ASSAY GLUCOSE BLOOD QUANT: CPT

## 2023-04-24 PROCEDURE — 99223 1ST HOSP IP/OBS HIGH 75: CPT | Performed by: NURSE PRACTITIONER

## 2023-04-24 PROCEDURE — 93005 ELECTROCARDIOGRAM TRACING: CPT | Performed by: EMERGENCY MEDICINE

## 2023-04-24 PROCEDURE — 80048 BASIC METABOLIC PNL TOTAL CA: CPT

## 2023-04-24 PROCEDURE — 87086 URINE CULTURE/COLONY COUNT: CPT

## 2023-04-24 PROCEDURE — 36600 WITHDRAWAL OF ARTERIAL BLOOD: CPT

## 2023-04-24 PROCEDURE — 87635 SARS-COV-2 COVID-19 AMP PRB: CPT

## 2023-04-24 PROCEDURE — 96376 TX/PRO/DX INJ SAME DRUG ADON: CPT

## 2023-04-24 PROCEDURE — 82803 BLOOD GASES ANY COMBINATION: CPT

## 2023-04-24 PROCEDURE — 94761 N-INVAS EAR/PLS OXIMETRY MLT: CPT

## 2023-04-24 PROCEDURE — 70450 CT HEAD/BRAIN W/O DYE: CPT

## 2023-04-24 RX ORDER — NADOLOL 40 MG/1
40 TABLET ORAL DAILY
Status: ON HOLD | COMMUNITY
End: 2023-04-24

## 2023-04-24 RX ORDER — DULOXETIN HYDROCHLORIDE 60 MG/1
60 CAPSULE, DELAYED RELEASE ORAL EVERY MORNING
Status: DISCONTINUED | OUTPATIENT
Start: 2023-04-25 | End: 2023-04-27 | Stop reason: HOSPADM

## 2023-04-24 RX ORDER — POLYETHYLENE GLYCOL 3350 17 G/17G
17 POWDER, FOR SOLUTION ORAL DAILY PRN
Status: DISCONTINUED | OUTPATIENT
Start: 2023-04-24 | End: 2023-04-27 | Stop reason: HOSPADM

## 2023-04-24 RX ORDER — POTASSIUM CHLORIDE 20 MEQ/1
20 TABLET, EXTENDED RELEASE ORAL DAILY
Status: DISCONTINUED | OUTPATIENT
Start: 2023-04-24 | End: 2023-04-27 | Stop reason: HOSPADM

## 2023-04-24 RX ORDER — INSULIN GLARGINE 100 [IU]/ML
37 INJECTION, SOLUTION SUBCUTANEOUS ONCE
Status: COMPLETED | OUTPATIENT
Start: 2023-04-24 | End: 2023-04-24

## 2023-04-24 RX ORDER — MAGNESIUM SULFATE 1 G/100ML
1000 INJECTION INTRAVENOUS PRN
Status: DISCONTINUED | OUTPATIENT
Start: 2023-04-24 | End: 2023-04-27 | Stop reason: HOSPADM

## 2023-04-24 RX ORDER — 0.9 % SODIUM CHLORIDE 0.9 %
500 INTRAVENOUS SOLUTION INTRAVENOUS ONCE
Status: COMPLETED | OUTPATIENT
Start: 2023-04-24 | End: 2023-04-24

## 2023-04-24 RX ORDER — TAMSULOSIN HYDROCHLORIDE 0.4 MG/1
0.4 CAPSULE ORAL DAILY
Status: DISCONTINUED | OUTPATIENT
Start: 2023-04-24 | End: 2023-04-27 | Stop reason: HOSPADM

## 2023-04-24 RX ORDER — NYSTATIN 100000 U/G
CREAM TOPICAL 2 TIMES DAILY PRN
Status: DISCONTINUED | OUTPATIENT
Start: 2023-04-24 | End: 2023-04-24

## 2023-04-24 RX ORDER — SODIUM CHLORIDE 9 MG/ML
INJECTION, SOLUTION INTRAVENOUS PRN
Status: DISCONTINUED | OUTPATIENT
Start: 2023-04-24 | End: 2023-04-27 | Stop reason: HOSPADM

## 2023-04-24 RX ORDER — SODIUM CHLORIDE 0.9 % (FLUSH) 0.9 %
10 SYRINGE (ML) INJECTION PRN
Status: DISCONTINUED | OUTPATIENT
Start: 2023-04-24 | End: 2023-04-27 | Stop reason: HOSPADM

## 2023-04-24 RX ORDER — PANTOPRAZOLE SODIUM 40 MG/1
40 TABLET, DELAYED RELEASE ORAL
Status: DISCONTINUED | OUTPATIENT
Start: 2023-04-25 | End: 2023-04-27 | Stop reason: HOSPADM

## 2023-04-24 RX ORDER — DOCUSATE SODIUM 100 MG/1
100 CAPSULE, LIQUID FILLED ORAL DAILY
Status: DISCONTINUED | OUTPATIENT
Start: 2023-04-24 | End: 2023-04-27 | Stop reason: HOSPADM

## 2023-04-24 RX ORDER — INSULIN GLARGINE 100 [IU]/ML
75 INJECTION, SOLUTION SUBCUTANEOUS 2 TIMES DAILY
Status: DISCONTINUED | OUTPATIENT
Start: 2023-04-24 | End: 2023-04-27 | Stop reason: HOSPADM

## 2023-04-24 RX ORDER — ONDANSETRON 4 MG/1
4 TABLET, ORALLY DISINTEGRATING ORAL EVERY 8 HOURS PRN
Status: DISCONTINUED | OUTPATIENT
Start: 2023-04-24 | End: 2023-04-27 | Stop reason: HOSPADM

## 2023-04-24 RX ORDER — CLONAZEPAM 0.5 MG/1
1 TABLET ORAL 2 TIMES DAILY PRN
Status: DISCONTINUED | OUTPATIENT
Start: 2023-04-24 | End: 2023-04-27 | Stop reason: HOSPADM

## 2023-04-24 RX ORDER — LANOLIN ALCOHOL/MO/W.PET/CERES
325 CREAM (GRAM) TOPICAL
Status: DISCONTINUED | OUTPATIENT
Start: 2023-04-25 | End: 2023-04-27 | Stop reason: HOSPADM

## 2023-04-24 RX ORDER — POTASSIUM CHLORIDE 7.45 MG/ML
10 INJECTION INTRAVENOUS PRN
Status: DISCONTINUED | OUTPATIENT
Start: 2023-04-24 | End: 2023-04-27 | Stop reason: HOSPADM

## 2023-04-24 RX ORDER — INSULIN LISPRO 100 [IU]/ML
0-4 INJECTION, SOLUTION INTRAVENOUS; SUBCUTANEOUS NIGHTLY
Status: DISCONTINUED | OUTPATIENT
Start: 2023-04-24 | End: 2023-04-27 | Stop reason: HOSPADM

## 2023-04-24 RX ORDER — BUMETANIDE 1 MG/1
1 TABLET ORAL 2 TIMES DAILY
Status: DISCONTINUED | OUTPATIENT
Start: 2023-04-24 | End: 2023-04-27 | Stop reason: HOSPADM

## 2023-04-24 RX ORDER — SODIUM CHLORIDE 0.9 % (FLUSH) 0.9 %
5-40 SYRINGE (ML) INJECTION EVERY 12 HOURS SCHEDULED
Status: DISCONTINUED | OUTPATIENT
Start: 2023-04-24 | End: 2023-04-27 | Stop reason: HOSPADM

## 2023-04-24 RX ORDER — INSULIN LISPRO 100 [IU]/ML
0-8 INJECTION, SOLUTION INTRAVENOUS; SUBCUTANEOUS
Status: DISCONTINUED | OUTPATIENT
Start: 2023-04-24 | End: 2023-04-27 | Stop reason: HOSPADM

## 2023-04-24 RX ORDER — LORAZEPAM 2 MG/ML
1 INJECTION INTRAMUSCULAR ONCE
Status: COMPLETED | OUTPATIENT
Start: 2023-04-24 | End: 2023-04-24

## 2023-04-24 RX ORDER — DEXTROSE MONOHYDRATE 100 MG/ML
INJECTION, SOLUTION INTRAVENOUS CONTINUOUS PRN
Status: DISCONTINUED | OUTPATIENT
Start: 2023-04-24 | End: 2023-04-27 | Stop reason: HOSPADM

## 2023-04-24 RX ORDER — POTASSIUM CHLORIDE 20 MEQ/1
40 TABLET, EXTENDED RELEASE ORAL PRN
Status: DISCONTINUED | OUTPATIENT
Start: 2023-04-24 | End: 2023-04-27 | Stop reason: HOSPADM

## 2023-04-24 RX ORDER — DIPHENHYDRAMINE HYDROCHLORIDE 50 MG/ML
25 INJECTION INTRAMUSCULAR; INTRAVENOUS ONCE
Status: COMPLETED | OUTPATIENT
Start: 2023-04-24 | End: 2023-04-24

## 2023-04-24 RX ORDER — ACETAMINOPHEN 325 MG/1
650 TABLET ORAL EVERY 6 HOURS PRN
Status: DISCONTINUED | OUTPATIENT
Start: 2023-04-24 | End: 2023-04-27 | Stop reason: HOSPADM

## 2023-04-24 RX ORDER — OXYCODONE AND ACETAMINOPHEN 7.5; 325 MG/1; MG/1
1 TABLET ORAL EVERY 6 HOURS PRN
Status: DISCONTINUED | OUTPATIENT
Start: 2023-04-24 | End: 2023-04-24

## 2023-04-24 RX ORDER — ONDANSETRON 2 MG/ML
4 INJECTION INTRAMUSCULAR; INTRAVENOUS EVERY 6 HOURS PRN
Status: DISCONTINUED | OUTPATIENT
Start: 2023-04-24 | End: 2023-04-27 | Stop reason: HOSPADM

## 2023-04-24 RX ORDER — ACETAMINOPHEN 650 MG/1
650 SUPPOSITORY RECTAL EVERY 6 HOURS PRN
Status: DISCONTINUED | OUTPATIENT
Start: 2023-04-24 | End: 2023-04-27 | Stop reason: HOSPADM

## 2023-04-24 RX ORDER — METOPROLOL TARTRATE 50 MG/1
50 TABLET, FILM COATED ORAL 2 TIMES DAILY
Status: DISCONTINUED | OUTPATIENT
Start: 2023-04-24 | End: 2023-04-27 | Stop reason: HOSPADM

## 2023-04-24 RX ORDER — AMIODARONE HYDROCHLORIDE 200 MG/1
200 TABLET ORAL 2 TIMES DAILY
Status: DISCONTINUED | OUTPATIENT
Start: 2023-04-24 | End: 2023-04-27 | Stop reason: HOSPADM

## 2023-04-24 RX ORDER — HALOPERIDOL 5 MG/ML
2 INJECTION INTRAMUSCULAR ONCE
Status: COMPLETED | OUTPATIENT
Start: 2023-04-24 | End: 2023-04-24

## 2023-04-24 RX ADMIN — SODIUM CHLORIDE, PRESERVATIVE FREE 10 ML: 5 INJECTION INTRAVENOUS at 22:51

## 2023-04-24 RX ADMIN — HALOPERIDOL LACTATE 2 MG: 5 INJECTION, SOLUTION INTRAMUSCULAR at 07:46

## 2023-04-24 RX ADMIN — ACETAMINOPHEN 650 MG: 325 TABLET ORAL at 22:56

## 2023-04-24 RX ADMIN — SODIUM CHLORIDE 500 ML: 9 INJECTION, SOLUTION INTRAVENOUS at 08:53

## 2023-04-24 RX ADMIN — DIPHENHYDRAMINE HYDROCHLORIDE 25 MG: 50 INJECTION, SOLUTION INTRAMUSCULAR; INTRAVENOUS at 07:26

## 2023-04-24 RX ADMIN — ANTI-FUNGAL POWDER MICONAZOLE NITRATE TALC FREE: 1.42 POWDER TOPICAL at 22:58

## 2023-04-24 RX ADMIN — DOCUSATE SODIUM 100 MG: 100 CAPSULE, LIQUID FILLED ORAL at 22:56

## 2023-04-24 RX ADMIN — INSULIN GLARGINE 37 UNITS: 100 INJECTION, SOLUTION SUBCUTANEOUS at 23:02

## 2023-04-24 RX ADMIN — METOPROLOL TARTRATE 50 MG: 50 TABLET, FILM COATED ORAL at 22:48

## 2023-04-24 RX ADMIN — AMIODARONE HYDROCHLORIDE 200 MG: 200 TABLET ORAL at 22:48

## 2023-04-24 RX ADMIN — BUMETANIDE 1 MG: 1 TABLET ORAL at 22:48

## 2023-04-24 RX ADMIN — LORAZEPAM 1 MG: 2 INJECTION INTRAMUSCULAR; INTRAVENOUS at 10:39

## 2023-04-24 ASSESSMENT — PAIN DESCRIPTION - LOCATION: LOCATION: BUTTOCKS;BACK

## 2023-04-24 ASSESSMENT — PAIN DESCRIPTION - DESCRIPTORS: DESCRIPTORS: ACHING

## 2023-04-24 NOTE — PLAN OF CARE
Problem: Discharge Planning  Goal: Discharge to home or other facility with appropriate resources  Outcome: Progressing  Flowsheets (Taken 4/24/2023 1520)  Discharge to home or other facility with appropriate resources: Identify barriers to discharge with patient and caregiver   Pt to discharge home vs. Facility when medically stable to do so. Problem: Safety - Adult  Goal: Free from fall injury  Outcome: Progressing  Flowsheets (Taken 4/24/2023 1549)  Free From Fall Injury: Instruct family/caregiver on patient safety   No injury noted this shift. Problem: Confusion  Goal: Confusion, delirium, dementia, or psychosis is improved or at baseline  Description: INTERVENTIONS:  1. Assess for possible contributors to thought disturbance, including medications, impaired vision or hearing, underlying metabolic abnormalities, dehydration, psychiatric diagnoses, and notify attending LIP  2. [place] high risk fall precautions, as indicated  3. Provide frequent short contacts to provide reality reorientation, refocusing and direction  4. Decrease environmental stimuli, including noise as appropriate  5. Monitor and intervene to maintain adequate nutrition, hydration, elimination, sleep and activity  6. If unable to ensure safety without constant attention obtain sitter and review sitter guidelines with assigned personnel  7. Initiate Psychosocial CNS and Spiritual Care consult, as indicated  Outcome: Progressing   Monitoring. Problem: Skin/Tissue Integrity  Goal: Absence of new skin breakdown  Description: 1. Monitor for areas of redness and/or skin breakdown  2. Assess vascular access sites hourly  3. Every 4-6 hours minimum:  Change oxygen saturation probe site  4. Every 4-6 hours:  If on nasal continuous positive airway pressure, respiratory therapy assess nares and determine need for appliance change or resting period. Outcome: Progressing   Monitoring. See skin assessment.    Problem: ABCDS Injury

## 2023-04-24 NOTE — H&P
[facility]  Office: [address], DO, Yolanda Garcia, DO, Oren Forbes, DO, Ángel Walsh Blood, DO, Shaniqua Machuca MD, Julissa James MD, Bhumi Vasquez MD, Sourav Rosales MD,  Ed Rodriguez MD, Gume Cormier MD, Joshua Pereira, DO, Torrie Kyle MD,  Cyrus Alex MD, Walter Enamorado MD, Maria Elena Lucas, DO, Martha Yusuf MD, Laisha Morrow MD, Pierre Bowden, DO, Barbara Mcginnis MD, Mell Abraham MD, Marleen Harada, MD, Jayant Linares MD,  Johnny Holstein, DO, Johnny Chirinos MD,  Suresh Underwood, CNP,  Leatha Copeland, CNP, Wendy Arango, CNP, Ariane Johnson, CNP,  Druscilla Severe, [facility], Abbie Constantino, [facility], Stef Stanley, [facility], Talya Chappell, CNP, Lanita Cogan, CNP, Hamlet Zhao, CNP, Bertram Bernardo PA-C, Jared Dumas, CNS, Jodi De La Cruz, CNP, Danielle Simmons, [facility]    HISTORY AND PHYSICAL EXAMINATION            Date:   4/24/2023  Patient name:  Solis Deluca  Date of admission:  4/24/2023  7:14 AM  MRN:   [id number]  Account:  [de-identified]  YOB: 1965  PCP:    Edi Denise MD  Room:   [place] [address]  Code Status:    Prior    Chief Complaint:     Chief Complaint   Patient presents with    Altered Mental Status       History Obtained From:     patient    History of Present Illness:     Solis Deluca is a 60 y.o. Non- / non  male who is well known to our service with multiple recent admissions and a complicated PMH presents with confusion after he activated his life alert and is admitted to the hospital for the management of AMS (altered mental status). Patient is noncompliant with medical treatment/medications at home. Unable to provide any meaningful history during my exam.  When asked if he knew where he was or what year it was he replied no.  I also asked if he had been wearing his cpap at home and he also stated no.patient is bed bound and wears continuous home oxygen

## 2023-04-24 NOTE — ED PROVIDER NOTES
Hypertension I10    IBS (irritable bowel syndrome) K58.9    Morbid obesity ([facility]) E66.01    Neuropathy G62.9    Chronic respiratory failure with hypoxia ([facility]) J96.11    Sleep apnea G47.30    Thyroid disease E07.9    Urinary bladder neurogenic dysfunction N31.9    Urinary tract infection associated with indwelling urethral catheter ([facility]) T83.511A, N39.0    Musculoskeletal immobility Z74.09    Pyocystis N30.80    Myoclonic jerking G25.3    Thrombocytopenia ([facility]) D69.6    Hypotension I95.9    Sepsis with acute hypercapnic respiratory failure and septic shock ([facility]) A41.9, R65.21, J96.02    Acute kidney injury superimposed on chronic kidney disease ([facility]) N17.9, N18.9    Somnolence R40.0    Acute respiratory acidosis ([facility]) J96.02    Lymphedema of both lower extremities I89.0    Venous stasis I87.8    Acute GI bleeding K92.2    Paroxysmal atrial fibrillation ([facility]) I48.0    Secondary hypercoagulable state ([facility] [address] D68.69    Anemia due to acute blood loss D62    AMS (altered mental status) R41.82     SURGICAL HISTORY       Past Surgical History:   Procedure Laterality Date    ABDOMEN SURGERY      hernia repair x4 (ventral)    COLONOSCOPY      2012    COLONOSCOPY N/A 04/19/2023    COLONOSCOPY DIAGNOSTIC performed by Carlos Enrique Hudson MD at [place] [address]  04/20/2023    COLONOSCOPY N/A 4/20/2023    COLONOSCOPY DIAGNOSTIC performed by Carlos Enrique Hudson MD at [address]  01/24/2018    CYSTOSCOPY  02/20/2019    CYSTOSCOPY N/A 02/20/2019    CYSTOSCOPY DILATION performed by Bear Martinez MD at [address] N/A 08/23/2019    CYSTOSCOPY/ DILATION NICOLE CATHETER EXCHANGE performed by Bear Martinez MD at [address] N/A 06/08/2022    CYSTOSCOPY, REMOVAL OF SMALL BLADDER STONE AND BLADDER IRRIGATION performed by Bear Martinez MD at [facility], COLON, [facility]  05/20/2018    repair and reduction of recurrent incarcerated incisional hernia

## 2023-04-24 NOTE — CARE COORDINATION
Case Management Assessment  Initial Evaluation    Date/Time of Evaluation: 4/24/2023 3:49 PM  Assessment Completed by: LEILA Murillo    If patient is discharged prior to next notation, then this note serves as note for discharge by case management. Patient Name: Jordan Guillory                   YOB: 1965  Diagnosis: AMS (altered mental status) [R41.82]                   Date / Time: 4/24/2023  7:14 AM    Patient Admission Status: Observation   Readmission Risk (Low < 19, Mod (19-27), High > 27): Readmission Risk Score: 22.6    Current PCP: Ravi Cano MD  PCP verified by CM? Yes    Chart Reviewed: Yes      History Provided by: Child/Family  Patient Orientation: Unable to Assess    Patient Cognition: Severely Impaired    Hospitalization in the last 30 days (Readmission):  Yes    If yes, Readmission Assessment in  Navigator will be completed. Advance Directives:      Code Status: Full Code   Patient's Primary Decision Maker is: Legal Next of Kin      Discharge Planning:    Patient lives with: Children Type of Home: House  Primary Care Giver: Private caregiver  Patient Support Systems include: Children   Current Financial resources: Medicare  Current community resources:  Other (Comment) ([facility])  Current services prior to admission: Durable Medical Equipment, Home Care, Oxygen Therapy            Current DME: Walker, Wheelchair, Oxygen Therapy (Comment)            Type of Home Care services:  Nursing Services, Aide Services    ADLS  Prior functional level: Assistance with the following:, Bathing, Dressing, Toileting, Cooking, Housework, Shopping, Mobility  Current functional level: Assistance with the following:, Toileting, Dressing, Bathing, Mobility, Cooking, Housework, Shopping, Feeding    PT AM-PAC:   /24  OT AM-PAC:   /24    Family can provide assistance at DC: No  Would you like Case Management to discuss the discharge plan with any other family members/significant

## 2023-04-24 NOTE — ED NOTES
ED to inpatient nurses report     Chief Complaint   Patient presents with    Altered Mental Status      Present to ED from home per EMS for altered mental status. Confused upon arrival. Caregiver arrives to his home and calls here upon discovering him not home. She states that she left his house last night 2045 and he was \"fine\".    LOC: alert to only name  Vital signs   Vitals:    04/24/23 0731 04/24/23 0816 04/24/23 0817 04/24/23 0819   BP:    125/68   Pulse:  68 68 68   Resp:  19 16 22   Temp:       TempSrc:       SpO2: 98% 98% 98% 98%   Weight:       Height:          Oxygen Baseline probably uses at home    Current needs required 2L NC   SEPSIS:   [] Lactate X 2 ordered (Yes or No)  [] Antibiotics given (Yes or No)  [] IV Fluids ordered (Yes or No)             [] 2nd IV completed (Yes or No)  [] Hourly Vital Signs (Validated)  [] Outstanding Orders:     LDAs:   Peripheral IV 04/24/23 Right Arm (Active)   Site Assessment Clean, dry & intact 04/24/23 0726   Line Status Blood return noted 04/24/23 0726     Mobility: Fully dependent  Fall Risk:    Pending ED orders:   Present condition:   Code Status:   Consults: None  []  Hospitalist  Completed  [] yes [] no Who:   []  Medicine  Completed  [] yes [] No Who:   []  Cardiology  Completed  [] yes [] No Who:   []  GI   Completed  [] yes [] No Who:   []  Neurology  Completed  [] yes [] No Who:   []  Nephrology Completed  [] yes [] No Who:    []  Vascular  Completed  [] yes [] No Who:   []  Ortho  Completed  [] yes [] No Who:     []  Surgery  Completed  [] yes [] No Who:    []  Urology  Completed  [] yes [] No Who:    []  CT Surgery Completed  [] yes [] No Who:   []  Podiatry  Completed  [] yes [] No Who:    []  Other    Completed  [] yes [] No Who:  Interventions: saline bolus, Ativan, Haldol, and Benadryl  Important Events:                Niya Marr RN  04/24/23 [address], RN  04/24/23 1652

## 2023-04-25 PROBLEM — L30.4 INTERTRIGINOUS DERMATITIS ASSOCIATED WITH MOISTURE: Status: ACTIVE | Noted: 2023-04-25

## 2023-04-25 PROBLEM — L30.8 DERMATITIS ASSOCIATED WITH MOISTURE: Status: ACTIVE | Noted: 2023-04-25

## 2023-04-25 LAB
ABSOLUTE EOS #: 0.22 K/UL (ref 0–0.4)
ABSOLUTE IMMATURE GRANULOCYTE: 0.11 K/UL (ref 0–0.3)
ABSOLUTE LYMPH #: 0.66 K/UL (ref 1–4.8)
ABSOLUTE MONO #: 0.77 K/UL (ref 0.2–0.8)
ALBUMIN SERPL-MCNC: 3.1 G/DL (ref 3.5–5.2)
ALP SERPL-CCNC: 198 U/L (ref 40–129)
ALT SERPL-CCNC: 13 U/L (ref 5–41)
AMMONIA PLAS-SCNC: 31 UMOL/L (ref 16–60)
ANION GAP SERPL CALCULATED.3IONS-SCNC: 7 MMOL/L (ref 9–17)
AST SERPL-CCNC: 12 U/L
BASOPHILS # BLD: 1 %
BASOPHILS ABSOLUTE: 0.11 K/UL (ref 0–0.2)
BILIRUB SERPL-MCNC: 0.8 MG/DL (ref 0.3–1.2)
BUN SERPL-MCNC: 10 MG/DL (ref 6–20)
BUN/CREAT BLD: 11 (ref 9–20)
CALCIUM SERPL-MCNC: 8.9 MG/DL (ref 8.6–10.4)
CHLORIDE SERPL-SCNC: 92 MMOL/L (ref 98–107)
CO2 SERPL-SCNC: 39 MMOL/L (ref 20–31)
CREAT SERPL-MCNC: 0.93 MG/DL (ref 0.7–1.2)
EKG ATRIAL RATE: 69 BPM
EKG P AXIS: 40 DEGREES
EKG P-R INTERVAL: 170 MS
EKG Q-T INTERVAL: 452 MS
EKG QRS DURATION: 148 MS
EKG QTC CALCULATION (BAZETT): 484 MS
EKG R AXIS: -38 DEGREES
EKG T AXIS: 17 DEGREES
EKG VENTRICULAR RATE: 69 BPM
EOSINOPHILS RELATIVE PERCENT: 2 % (ref 1–4)
GFR SERPL CREATININE-BSD FRML MDRD: >60 ML/MIN/1.73M2
GLUCOSE BLD-MCNC: 131 MG/DL (ref 75–110)
GLUCOSE BLD-MCNC: 164 MG/DL (ref 75–110)
GLUCOSE BLD-MCNC: 177 MG/DL (ref 75–110)
GLUCOSE BLD-MCNC: 197 MG/DL (ref 75–110)
GLUCOSE SERPL-MCNC: 138 MG/DL (ref 70–99)
HCT VFR BLD AUTO: 31.9 % (ref 40.7–50.3)
HGB BLD-MCNC: 9.5 G/DL (ref 13–17)
IMMATURE GRANULOCYTES: 1 %
LYMPHOCYTES # BLD: 6 % (ref 24–44)
MCH RBC QN AUTO: 26.8 PG (ref 25.2–33.5)
MCHC RBC AUTO-ENTMCNC: 29.8 G/DL (ref 28.4–34.8)
MCV RBC AUTO: 89.9 FL (ref 82.6–102.9)
MICROORGANISM SPEC CULT: NORMAL
MONOCYTES # BLD: 7 % (ref 1–7)
NRBC AUTOMATED: 0 PER 100 WBC
PDW BLD-RTO: 14.5 % (ref 11.8–14.4)
PLATELET # BLD AUTO: 182 K/UL (ref 138–453)
PMV BLD AUTO: 9.7 FL (ref 8.1–13.5)
POTASSIUM SERPL-SCNC: 3.6 MMOL/L (ref 3.7–5.3)
PROT SERPL-MCNC: 6.9 G/DL (ref 6.4–8.3)
RBC # BLD: 3.55 M/UL (ref 4.21–5.77)
SEG NEUTROPHILS: 83 % (ref 36–66)
SEGMENTED NEUTROPHILS ABSOLUTE COUNT: 9.13 K/UL (ref 1.8–7.7)
SODIUM SERPL-SCNC: 138 MMOL/L (ref 135–144)
SPECIMEN DESCRIPTION: NORMAL
WBC # BLD AUTO: 11 K/UL (ref 3.5–11.3)

## 2023-04-25 PROCEDURE — 85025 COMPLETE CBC W/AUTO DIFF WBC: CPT

## 2023-04-25 PROCEDURE — 6370000000 HC RX 637 (ALT 250 FOR IP): Performed by: NURSE PRACTITIONER

## 2023-04-25 PROCEDURE — 93010 ELECTROCARDIOGRAM REPORT: CPT | Performed by: INTERNAL MEDICINE

## 2023-04-25 PROCEDURE — 2580000003 HC RX 258: Performed by: NURSE PRACTITIONER

## 2023-04-25 PROCEDURE — 97530 THERAPEUTIC ACTIVITIES: CPT

## 2023-04-25 PROCEDURE — 99222 1ST HOSP IP/OBS MODERATE 55: CPT

## 2023-04-25 PROCEDURE — 94761 N-INVAS EAR/PLS OXIMETRY MLT: CPT

## 2023-04-25 PROCEDURE — 97129 THER IVNTJ 1ST 15 MIN: CPT

## 2023-04-25 PROCEDURE — 97535 SELF CARE MNGMENT TRAINING: CPT

## 2023-04-25 PROCEDURE — 97163 PT EVAL HIGH COMPLEX 45 MIN: CPT

## 2023-04-25 PROCEDURE — 80053 COMPREHEN METABOLIC PANEL: CPT

## 2023-04-25 PROCEDURE — 97167 OT EVAL HIGH COMPLEX 60 MIN: CPT

## 2023-04-25 PROCEDURE — 99232 SBSQ HOSP IP/OBS MODERATE 35: CPT | Performed by: NURSE PRACTITIONER

## 2023-04-25 PROCEDURE — 94660 CPAP INITIATION&MGMT: CPT

## 2023-04-25 PROCEDURE — 2700000000 HC OXYGEN THERAPY PER DAY

## 2023-04-25 PROCEDURE — 82140 ASSAY OF AMMONIA: CPT

## 2023-04-25 PROCEDURE — 36415 COLL VENOUS BLD VENIPUNCTURE: CPT

## 2023-04-25 PROCEDURE — 82947 ASSAY GLUCOSE BLOOD QUANT: CPT

## 2023-04-25 PROCEDURE — 97110 THERAPEUTIC EXERCISES: CPT

## 2023-04-25 PROCEDURE — G0378 HOSPITAL OBSERVATION PER HR: HCPCS

## 2023-04-25 RX ORDER — HYDROXYZINE HYDROCHLORIDE 25 MG/1
25 TABLET, FILM COATED ORAL EVERY 8 HOURS PRN
Status: DISCONTINUED | OUTPATIENT
Start: 2023-04-25 | End: 2023-04-27 | Stop reason: HOSPADM

## 2023-04-25 RX ORDER — MIDODRINE HYDROCHLORIDE 10 MG/1
10 TABLET ORAL 3 TIMES DAILY PRN
Status: DISCONTINUED | OUTPATIENT
Start: 2023-04-25 | End: 2023-04-27 | Stop reason: HOSPADM

## 2023-04-25 RX ORDER — QUETIAPINE FUMARATE 50 MG/1
50 TABLET, EXTENDED RELEASE ORAL NIGHTLY
Status: DISCONTINUED | OUTPATIENT
Start: 2023-04-25 | End: 2023-04-27 | Stop reason: HOSPADM

## 2023-04-25 RX ADMIN — METOPROLOL TARTRATE 50 MG: 50 TABLET, FILM COATED ORAL at 09:52

## 2023-04-25 RX ADMIN — DOCUSATE SODIUM 100 MG: 100 CAPSULE, LIQUID FILLED ORAL at 23:16

## 2023-04-25 RX ADMIN — QUETIAPINE FUMARATE 50 MG: 50 TABLET, EXTENDED RELEASE ORAL at 23:15

## 2023-04-25 RX ADMIN — SODIUM CHLORIDE, PRESERVATIVE FREE 10 ML: 5 INJECTION INTRAVENOUS at 10:03

## 2023-04-25 RX ADMIN — POLYETHYLENE GLYCOL 3350 17 G: 17 POWDER, FOR SOLUTION ORAL at 00:57

## 2023-04-25 RX ADMIN — QUETIAPINE FUMARATE 50 MG: 50 TABLET, EXTENDED RELEASE ORAL at 00:57

## 2023-04-25 RX ADMIN — CLONAZEPAM 1 MG: 0.5 TABLET ORAL at 23:16

## 2023-04-25 RX ADMIN — SODIUM CHLORIDE, PRESERVATIVE FREE 10 ML: 5 INJECTION INTRAVENOUS at 23:16

## 2023-04-25 RX ADMIN — BUMETANIDE 1 MG: 1 TABLET ORAL at 23:15

## 2023-04-25 RX ADMIN — FERROUS SULFATE TAB EC 325 MG (65 MG FE EQUIVALENT) 325 MG: 325 (65 FE) TABLET DELAYED RESPONSE at 09:54

## 2023-04-25 RX ADMIN — AMIODARONE HYDROCHLORIDE 200 MG: 200 TABLET ORAL at 10:35

## 2023-04-25 RX ADMIN — PANTOPRAZOLE SODIUM 40 MG: 40 TABLET, DELAYED RELEASE ORAL at 09:54

## 2023-04-25 RX ADMIN — CLONAZEPAM 1 MG: 0.5 TABLET ORAL at 10:35

## 2023-04-25 RX ADMIN — TAMSULOSIN HYDROCHLORIDE 0.4 MG: 0.4 CAPSULE ORAL at 09:52

## 2023-04-25 RX ADMIN — DULOXETINE HYDROCHLORIDE 60 MG: 60 CAPSULE, DELAYED RELEASE ORAL at 09:51

## 2023-04-25 RX ADMIN — ANTI-FUNGAL POWDER MICONAZOLE NITRATE TALC FREE: 1.42 POWDER TOPICAL at 12:00

## 2023-04-25 RX ADMIN — HYDROXYZINE HYDROCHLORIDE 25 MG: 25 TABLET, FILM COATED ORAL at 17:22

## 2023-04-25 RX ADMIN — AMIODARONE HYDROCHLORIDE 200 MG: 200 TABLET ORAL at 23:16

## 2023-04-25 RX ADMIN — LEVOTHYROXINE SODIUM 175 MCG: 150 TABLET ORAL at 09:52

## 2023-04-25 RX ADMIN — HYDROXYZINE HYDROCHLORIDE 25 MG: 25 TABLET, FILM COATED ORAL at 00:57

## 2023-04-25 RX ADMIN — BUMETANIDE 1 MG: 1 TABLET ORAL at 09:52

## 2023-04-25 RX ADMIN — INSULIN GLARGINE 75 UNITS: 100 INJECTION, SOLUTION SUBCUTANEOUS at 09:55

## 2023-04-25 RX ADMIN — ANTI-FUNGAL POWDER MICONAZOLE NITRATE TALC FREE: 1.42 POWDER TOPICAL at 23:17

## 2023-04-25 NOTE — CARE COORDINATION
Social Work-[place] denied pt. Discussed with son, Janett Klein. Discussed that previously he was accepted at [facility]. [facility], and [facility]. Janett Klein is requesting a referral to [facility].  Sent referral. Kaye Biswas

## 2023-04-25 NOTE — PLAN OF CARE
Problem: Discharge Planning  Goal: Discharge to home or other facility with appropriate resources  4/24/2023 2211 by Andree Ramos RN  Outcome: Progressing     Problem: Safety - Adult  Goal: Free from fall injury  4/24/2023 2211 by Andree Ramos RN  Outcome: Progressing     Problem: Confusion  Goal: Confusion, delirium, dementia, or psychosis is improved or at baseline  Description: INTERVENTIONS:  1. Assess for possible contributors to thought disturbance, including medications, impaired vision or hearing, underlying metabolic abnormalities, dehydration, psychiatric diagnoses, and notify attending LIP  2. [place] high risk fall precautions, as indicated  3. Provide frequent short contacts to provide reality reorientation, refocusing and direction  4. Decrease environmental stimuli, including noise as appropriate  5. Monitor and intervene to maintain adequate nutrition, hydration, elimination, sleep and activity  6. If unable to ensure safety without constant attention obtain sitter and review sitter guidelines with assigned personnel  7. Initiate Psychosocial CNS and Spiritual Care consult, as indicated  4/24/2023 2211 by Andree Ramos RN  Outcome: Progressing     Problem: Skin/Tissue Integrity  Goal: Absence of new skin breakdown  Description: 1. Monitor for areas of redness and/or skin breakdown  2. Assess vascular access sites hourly  3. Every 4-6 hours minimum:  Change oxygen saturation probe site  4. Every 4-6 hours:  If on nasal continuous positive airway pressure, respiratory therapy assess nares and determine need for appliance change or resting period.   4/24/2023 2211 by Andree Ramos RN  Outcome: Progressing

## 2023-04-25 NOTE — PLAN OF CARE
Problem: Discharge Planning  Goal: Discharge to home or other facility with appropriate resources  Outcome: Progressing     Problem: Safety - Adult  Goal: Free from fall injury  Outcome: Progressing     Problem: Confusion  Goal: Confusion, delirium, dementia, or psychosis is improved or at baseline  Description: INTERVENTIONS:  1. Assess for possible contributors to thought disturbance, including medications, impaired vision or hearing, underlying metabolic abnormalities, dehydration, psychiatric diagnoses, and notify attending LIP  2. [place] high risk fall precautions, as indicated  3. Provide frequent short contacts to provide reality reorientation, refocusing and direction  4. Decrease environmental stimuli, including noise as appropriate  5. Monitor and intervene to maintain adequate nutrition, hydration, elimination, sleep and activity  6. If unable to ensure safety without constant attention obtain sitter and review sitter guidelines with assigned personnel  7. Initiate Psychosocial CNS and Spiritual Care consult, as indicated  Outcome: Progressing     Problem: Skin/Tissue Integrity  Goal: Absence of new skin breakdown  Description: 1. Monitor for areas of redness and/or skin breakdown  2. Assess vascular access sites hourly  3. Every 4-6 hours minimum:  Change oxygen saturation probe site  4. Every 4-6 hours:  If on nasal continuous positive airway pressure, respiratory therapy assess nares and determine need for appliance change or resting period.   Outcome: Progressing     Problem: ABCDS Injury Assessment  Goal: Absence of physical injury  Outcome: Progressing     Problem: Respiratory - Adult  Goal: Achieves optimal ventilation and oxygenation  4/25/2023 1416 by Ariadna Davis RN  Outcome: Progressing

## 2023-04-26 PROBLEM — R41.82 AMS (ALTERED MENTAL STATUS): Status: RESOLVED | Noted: 2023-04-24 | Resolved: 2023-04-26

## 2023-04-26 LAB
ABSOLUTE EOS #: 0.26 K/UL (ref 0–0.44)
ABSOLUTE IMMATURE GRANULOCYTE: 0.03 K/UL (ref 0–0.3)
ABSOLUTE LYMPH #: 0.77 K/UL (ref 1.1–3.7)
ABSOLUTE MONO #: 0.74 K/UL (ref 0.1–1.2)
ALBUMIN SERPL-MCNC: 3.1 G/DL (ref 3.5–5.2)
ALP SERPL-CCNC: 199 U/L (ref 40–129)
ALT SERPL-CCNC: 13 U/L (ref 5–41)
ANION GAP SERPL CALCULATED.3IONS-SCNC: 7 MMOL/L (ref 9–17)
AST SERPL-CCNC: 17 U/L
BASOPHILS # BLD: 1 % (ref 0–2)
BASOPHILS ABSOLUTE: 0.07 K/UL (ref 0–0.2)
BILIRUB SERPL-MCNC: 1.2 MG/DL (ref 0.3–1.2)
BUN SERPL-MCNC: 11 MG/DL (ref 6–20)
BUN/CREAT BLD: 13 (ref 9–20)
CALCIUM SERPL-MCNC: 8.9 MG/DL (ref 8.6–10.4)
CHLORIDE SERPL-SCNC: 89 MMOL/L (ref 98–107)
CO2 SERPL-SCNC: 41 MMOL/L (ref 20–31)
CREAT SERPL-MCNC: 0.84 MG/DL (ref 0.7–1.2)
EOSINOPHILS RELATIVE PERCENT: 3 % (ref 1–4)
GFR SERPL CREATININE-BSD FRML MDRD: >60 ML/MIN/1.73M2
GLUCOSE BLD-MCNC: 137 MG/DL (ref 75–110)
GLUCOSE BLD-MCNC: 173 MG/DL (ref 75–110)
GLUCOSE BLD-MCNC: 199 MG/DL (ref 75–110)
GLUCOSE BLD-MCNC: 97 MG/DL (ref 75–110)
GLUCOSE SERPL-MCNC: 107 MG/DL (ref 70–99)
HCT VFR BLD AUTO: 33.1 % (ref 40.7–50.3)
HGB BLD-MCNC: 9.8 G/DL (ref 13–17)
IMMATURE GRANULOCYTES: 0 %
LYMPHOCYTES # BLD: 9 % (ref 24–43)
MCH RBC QN AUTO: 27.3 PG (ref 25.2–33.5)
MCHC RBC AUTO-ENTMCNC: 29.6 G/DL (ref 28.4–34.8)
MCV RBC AUTO: 92.2 FL (ref 82.6–102.9)
MONOCYTES # BLD: 8 % (ref 3–12)
NRBC AUTOMATED: 0 PER 100 WBC
PDW BLD-RTO: 15 % (ref 11.8–14.4)
PLATELET # BLD AUTO: 150 K/UL (ref 138–453)
PMV BLD AUTO: 9.7 FL (ref 8.1–13.5)
POTASSIUM SERPL-SCNC: 3.8 MMOL/L (ref 3.7–5.3)
PROT SERPL-MCNC: 7.1 G/DL (ref 6.4–8.3)
RBC # BLD: 3.59 M/UL (ref 4.21–5.77)
RBC # BLD: ABNORMAL 10*6/UL
SEG NEUTROPHILS: 79 % (ref 36–65)
SEGMENTED NEUTROPHILS ABSOLUTE COUNT: 7.03 K/UL (ref 1.5–8.1)
SODIUM SERPL-SCNC: 137 MMOL/L (ref 135–144)
WBC # BLD AUTO: 8.9 K/UL (ref 3.5–11.3)

## 2023-04-26 PROCEDURE — G0378 HOSPITAL OBSERVATION PER HR: HCPCS

## 2023-04-26 PROCEDURE — 94660 CPAP INITIATION&MGMT: CPT

## 2023-04-26 PROCEDURE — 82947 ASSAY GLUCOSE BLOOD QUANT: CPT

## 2023-04-26 PROCEDURE — 97110 THERAPEUTIC EXERCISES: CPT

## 2023-04-26 PROCEDURE — 2700000000 HC OXYGEN THERAPY PER DAY

## 2023-04-26 PROCEDURE — 97535 SELF CARE MNGMENT TRAINING: CPT

## 2023-04-26 PROCEDURE — 6370000000 HC RX 637 (ALT 250 FOR IP): Performed by: NURSE PRACTITIONER

## 2023-04-26 PROCEDURE — 2580000003 HC RX 258: Performed by: NURSE PRACTITIONER

## 2023-04-26 PROCEDURE — 85025 COMPLETE CBC W/AUTO DIFF WBC: CPT

## 2023-04-26 PROCEDURE — 94761 N-INVAS EAR/PLS OXIMETRY MLT: CPT

## 2023-04-26 PROCEDURE — 36415 COLL VENOUS BLD VENIPUNCTURE: CPT

## 2023-04-26 PROCEDURE — 80053 COMPREHEN METABOLIC PANEL: CPT

## 2023-04-26 PROCEDURE — 97530 THERAPEUTIC ACTIVITIES: CPT

## 2023-04-26 PROCEDURE — 99239 HOSP IP/OBS DSCHRG MGMT >30: CPT | Performed by: NURSE PRACTITIONER

## 2023-04-26 RX ORDER — OXYCODONE HYDROCHLORIDE AND ACETAMINOPHEN 5; 325 MG/1; MG/1
1 TABLET ORAL EVERY 6 HOURS PRN
Status: DISCONTINUED | OUTPATIENT
Start: 2023-04-26 | End: 2023-04-27 | Stop reason: HOSPADM

## 2023-04-26 RX ORDER — MIDODRINE HYDROCHLORIDE 10 MG/1
10 TABLET ORAL 3 TIMES DAILY PRN
COMMUNITY
Start: 2023-04-26

## 2023-04-26 RX ORDER — INSULIN GLARGINE 100 [IU]/ML
40 INJECTION, SOLUTION SUBCUTANEOUS ONCE
Status: COMPLETED | OUTPATIENT
Start: 2023-04-26 | End: 2023-04-26

## 2023-04-26 RX ADMIN — ANTI-FUNGAL POWDER MICONAZOLE NITRATE TALC FREE: 1.42 POWDER TOPICAL at 21:06

## 2023-04-26 RX ADMIN — BUMETANIDE 1 MG: 1 TABLET ORAL at 09:10

## 2023-04-26 RX ADMIN — METOPROLOL TARTRATE 50 MG: 50 TABLET, FILM COATED ORAL at 09:10

## 2023-04-26 RX ADMIN — TAMSULOSIN HYDROCHLORIDE 0.4 MG: 0.4 CAPSULE ORAL at 09:10

## 2023-04-26 RX ADMIN — LEVOTHYROXINE SODIUM 175 MCG: 150 TABLET ORAL at 05:07

## 2023-04-26 RX ADMIN — AMIODARONE HYDROCHLORIDE 200 MG: 200 TABLET ORAL at 09:10

## 2023-04-26 RX ADMIN — QUETIAPINE FUMARATE 50 MG: 50 TABLET, EXTENDED RELEASE ORAL at 21:06

## 2023-04-26 RX ADMIN — CLONAZEPAM 1 MG: 0.5 TABLET ORAL at 09:29

## 2023-04-26 RX ADMIN — INSULIN GLARGINE 40 UNITS: 100 INJECTION, SOLUTION SUBCUTANEOUS at 21:06

## 2023-04-26 RX ADMIN — SODIUM CHLORIDE, PRESERVATIVE FREE 10 ML: 5 INJECTION INTRAVENOUS at 21:13

## 2023-04-26 RX ADMIN — OXYCODONE AND ACETAMINOPHEN 1 TABLET: 325; 5 TABLET ORAL at 21:06

## 2023-04-26 RX ADMIN — BUMETANIDE 1 MG: 1 TABLET ORAL at 21:06

## 2023-04-26 RX ADMIN — SODIUM CHLORIDE, PRESERVATIVE FREE 10 ML: 5 INJECTION INTRAVENOUS at 09:11

## 2023-04-26 RX ADMIN — DULOXETINE HYDROCHLORIDE 60 MG: 60 CAPSULE, DELAYED RELEASE ORAL at 09:10

## 2023-04-26 RX ADMIN — PANTOPRAZOLE SODIUM 40 MG: 40 TABLET, DELAYED RELEASE ORAL at 05:07

## 2023-04-26 RX ADMIN — ACETAMINOPHEN 650 MG: 325 TABLET ORAL at 01:15

## 2023-04-26 RX ADMIN — FERROUS SULFATE TAB EC 325 MG (65 MG FE EQUIVALENT) 325 MG: 325 (65 FE) TABLET DELAYED RESPONSE at 09:11

## 2023-04-26 RX ADMIN — AMIODARONE HYDROCHLORIDE 200 MG: 200 TABLET ORAL at 21:06

## 2023-04-26 RX ADMIN — POTASSIUM CHLORIDE 20 MEQ: 1500 TABLET, EXTENDED RELEASE ORAL at 09:10

## 2023-04-26 RX ADMIN — DOCUSATE SODIUM 100 MG: 100 CAPSULE, LIQUID FILLED ORAL at 21:06

## 2023-04-26 RX ADMIN — ANTI-FUNGAL POWDER MICONAZOLE NITRATE TALC FREE: 1.42 POWDER TOPICAL at 09:10

## 2023-04-26 RX ADMIN — CLONAZEPAM 1 MG: 0.5 TABLET ORAL at 21:06

## 2023-04-26 RX ADMIN — METOPROLOL TARTRATE 50 MG: 50 TABLET, FILM COATED ORAL at 21:06

## 2023-04-26 ASSESSMENT — PAIN DESCRIPTION - LOCATION: LOCATION: HEAD

## 2023-04-26 ASSESSMENT — PAIN DESCRIPTION - DESCRIPTORS: DESCRIPTORS: ACHING

## 2023-04-26 ASSESSMENT — PAIN DESCRIPTION - ORIENTATION: ORIENTATION: ANTERIOR

## 2023-04-26 ASSESSMENT — PAIN SCALES - GENERAL: PAINLEVEL_OUTOF10: 3

## 2023-04-26 NOTE — PLAN OF CARE
Continue with alternating bipap and nasal cannula. Encourage po intake, monitor bs and administration of lantus. Plans for [facility] if patient agrees.

## 2023-04-26 NOTE — CARE COORDINATION
Social Work-Met with pt to discuss decision . Patient is refusing SNF. Discussed with pt that he is using a bipap here and he does not have one at home. Discussed that it can not be ordered until he has an outpatient sleep study. Discussed that he would benefit from going to [facility] while he is waitng for sleep study so that he can have his bipap until that time. Pt continued to refuse. Contacted [facility] [phone] and discussed that pt will be going home tomorrow at 9 AM. Pt does not want  to call his son. He states that  could call Humbertosheela Farhan. Phone call  to Dank Rivas). Left message. Spoke with [facility] . Discussed that pt will be returning home tomorrow at 9am. She stated that [facility] will be able to check pt's blood sugars twice a day. Updated  since Becca Del Angel was the provider of skilled services. She will call the [facility] .  Donnell Johnson

## 2023-04-26 NOTE — DISCHARGE INSTR - COC
of Catheter: neurogenic bladder, chronic    Colostomy/Ileostomy/Ileal Conduit: No       Date of Last BM: ***    Intake/Output Summary (Last 24 hours) at 4/26/2023 1528  Last data filed at 4/26/2023 0521  Gross per 24 hour   Intake 100 ml   Output 1300 ml   Net -1200 ml     I/O last 3 completed shifts: In: 100 [P.O.:100]  Out: 3250 [Urine:3250]    Safety Concerns: At Risk for Falls    Impairments/Disabilities:      None    Nutrition Therapy:  Current Nutrition Therapy:   - Oral Diet:  Carb Control 4 carbs/meal (1800kcals/day) and Easy to Chew    Routes of Feeding: Oral  Liquids: No Restrictions  Daily Fluid Restriction: yes - amount 1800  Last Modified Barium Swallow with Video (Video Swallowing Test): not done    Treatments at the Time of Hospital Discharge:   Respiratory Treatments: n/a  Oxygen Therapy:  is on oxygen at 3 L/min per nasal cannula.   Ventilator:    - No ventilator support  - BiPAP   IPAP: 20 cmH20, CPAP/EPAP: 10 cmH2O only when sleeping    Rehab Therapies: Physical Therapy and Occupational Therapy  Weight Bearing Status/Restrictions: No weight bearing restrictions  Other Medical Equipment (for information only, NOT a DME order):  hospital bed  Other Treatments: Skilled nursing assessment  Med teaching and compliance  BID blood sugars  Change wells catheter every 30 days  Sleep study     Patient's personal belongings (please select all that are sent with patient):  Medications, tshirt,     RN SIGNATURE:  Electronically signed by Belinda Gardner RN on 4/27/23 at 9:00 AM EDT    CASE MANAGEMENT/SOCIAL WORK SECTION    Inpatient Status Date: ***    Readmission Risk Assessment Score:  Readmission Risk              Risk of Unplanned Readmission:  0           Discharging to Facility/ Agency   Name: [facility]  Address:  Phone: [phone]  Fax: [phone]      / signature: Electronically signed by Tashia Castellanos RN on 4/26/23 at 3:56 PM EDT    PHYSICIAN

## 2023-04-26 NOTE — CARE COORDINATION
Spoke to [address] at [facility] and pt will D/C home with [facility] since they can provide blood sugar checks twice a day.     Linnette Chadwick with [facility] informed to cancel referral.

## 2023-04-26 NOTE — ACP (ADVANCE CARE PLANNING)
..Advance Care Planning     Advance Care Planning Activator (Inpatient)  Conversation Note      Date of ACP Conversation: 4/26/2023     [employer] with: I talked to the patient, and he states that he has 3 children, 2 sons and a daughter     ACP Activator: Julieth Nolasco, [address] Decision Maker: Casandra Hair) and another son and a daughter     Current Designated Health Care Decision Maker:     Click here to complete [address] including section of the Healthcare Decision Maker Relationship (ie \"Primary\")      Care Preferences    Ventilation: \"If you were in your present state of health and suddenly became very ill and were unable to breathe on your own, what would your preference be about the use of a ventilator (breathing machine) if it were available to you? \"      Would the patient desire the use of ventilator (breathing machine)?: yes    \"If your health worsens and it becomes clear that your chance of recovery is unlikely, what would your preference be about the use of a ventilator (breathing machine) if it were available to you? \"     Would the patient desire the use of ventilator (breathing machine)?: Yes      Resuscitation  \"CPR works best to restart the heart when there is a sudden event, like a heart attack, in someone who is otherwise healthy. Unfortunately, CPR does not typically restart the heart for people who have serious health conditions or who are very sick. \"    \"In the event your heart stopped as a result of an underlying serious health condition, would you want attempts to be made to restart your heart (answer \"yes\" for attempt to resuscitate) or would you prefer a natural death (answer \"no\" for do not attempt to resuscitate)? \" yes       [] Yes   [x] No   Educated Patient / Allison Leap regarding differences between Advance Directives and portable DNR orders.     Length of ACP Conversation in minutes:      Conversation Outcomes:  ACP discussion

## 2023-04-26 NOTE — PLAN OF CARE
Problem: Discharge Planning  Goal: Discharge to home or other facility with appropriate resources  Outcome: Progressing     Problem: Safety - Adult  Goal: Free from fall injury  Outcome: Progressing     Problem: Confusion  Goal: Confusion, delirium, dementia, or psychosis is improved or at baseline  Description: INTERVENTIONS:  1. Assess for possible contributors to thought disturbance, including medications, impaired vision or hearing, underlying metabolic abnormalities, dehydration, psychiatric diagnoses, and notify attending LIP  2. [place] high risk fall precautions, as indicated  3. Provide frequent short contacts to provide reality reorientation, refocusing and direction  4. Decrease environmental stimuli, including noise as appropriate  5. Monitor and intervene to maintain adequate nutrition, hydration, elimination, sleep and activity  6. If unable to ensure safety without constant attention obtain sitter and review sitter guidelines with assigned personnel  7. Initiate Psychosocial CNS and Spiritual Care consult, as indicated  Outcome: Progressing     Problem: Skin/Tissue Integrity  Goal: Absence of new skin breakdown  Description: 1. Monitor for areas of redness and/or skin breakdown  2. Assess vascular access sites hourly  3. Every 4-6 hours minimum:  Change oxygen saturation probe site  4. Every 4-6 hours:  If on nasal continuous positive airway pressure, respiratory therapy assess nares and determine need for appliance change or resting period.   Outcome: Progressing     Problem: ABCDS Injury Assessment  Goal: Absence of physical injury  Outcome: Progressing     Problem: Respiratory - Adult  Goal: Achieves optimal ventilation and oxygenation  Outcome: Progressing     Problem: Nutrition Deficit:  Goal: Optimize nutritional status  Outcome: Progressing

## 2023-04-26 NOTE — DISCHARGE SUMMARY
LABGLOM >60 04/26/2023 06:07 AM    GFRAA >60 01/21/2022 04:44 AM    GFR      01/21/2022 04:44 AM    GFR NOT REPORTED 01/21/2022 04:44 AM        Radiology:  CT Head W/O Contrast    Result Date: 4/24/2023  Some motion artifacts degrade images. No acute intracranial abnormality. XR CHEST PORTABLE    Result Date: 4/24/2023  Cardiomegaly with diffuse reticular opacities bilateral effusions and superimposed alveolar type opacities favoring interstitial edema over multifocal airspace disease of an infectious for ID. Consultations:    Consults:     Final Specialist Recommendations/Findings:   IP CONSULT TO INTERNAL MEDICINE  IP CONSULT TO RESPIRATORY CARE  IP CONSULT TO PALLIATIVE CARE      The patient was seen and examined on day of discharge and this discharge summary is in conjunction with any daily progress note from day of discharge.     Discharge plan:     Disposition: Home    Physician Follow Up:     MD Wenceslao Craven. [place]  [phone]    Follow up in 1 week(s)         Requiring Further Evaluation/Follow Up POST HOSPITALIZATION/Incidental Findings: outpatient follow up with PCP     Diet: diabetic diet    Activity: As tolerated    Instructions to Patient: take all medications as prescribed, TLC as discussed, follow up with PCP, stop taking corgard     Discharge Medications:      Medication List        CONTINUE taking these medications      amiodarone 200 MG tablet  Commonly known as: CORDARONE  Take 1 tablet by mouth 2 times daily     Basaglar KwikPen 100 UNIT/ML injection pen  Generic drug: insulin glargine     bumetanide 1 MG tablet  Commonly known as: BUMEX  Take 1 tablet by mouth 2 times daily     clobetasol 0.05 % ointment  Commonly known as: TEMOVATE     clonazePAM 1 MG tablet  Commonly known as: KLONOPIN     docusate sodium 100 MG capsule  Commonly known as: COLACE     DULoxetine 60 MG extended release capsule  Commonly known as: CYMBALTA

## 2023-04-26 NOTE — CONSULTS
..  Palliative Care Inpatient Consult    NAME:  Yudi Mcdonald  MEDICAL RECORD NUMBER:  [id number]  AGE: 60 y.o. GENDER: male  : 1965  TODAY'S DATE:  2023    Reasons for Consultation:    Provision of information regarding PC and/or hospice philosophies  Complex, time-intensive communication and interdisciplinary psychosocial support  Clarification of goals of care and/or assistance with difficult decision-making  Guidance in regards to resources and transition(s)    Code Status:     Members of PC team contributing to this consultation are :  Sarika Bravo R.N     History of Present Illness     The patient is a 60 y.o. Non- / non  male who presents with Altered Mental Status    Referred to Palliative Care by   [x] Physician   [] Nursing  [] Family Request   [] Other:       He was admitted to the primary service for AMS (altered mental status) [R41.82]. His hospital course has been associated with AMS (altered mental status).  The patient has a complicated medical history and has been hospitalized since 2023  7:14 AM.    Active Hospital Problems    Diagnosis Date Noted    Type 2 diabetes mellitus with diabetic neuropathy, with long-term current use of insulin (HCC) [E11.40, Z79.4]      Priority: Medium    Dyslipidemia [E78.5]      Priority: Medium    Intertriginous dermatitis associated with moisture [L30.4] 2023    Dermatitis associated with moisture [L30.8] 2023    AMS (altered mental status) [R41.82] 2023    Paroxysmal atrial fibrillation ([facility] [address] [I48.0] 2023    Lymphedema of both lower extremities [I89.0] 2023    Chronic indwelling Clemons catheter [Z97.8] 2020    Anxiety and depression [F41.9, F32.A]     GERD (gastroesophageal reflux disease) [K21.9]     Hypertension [I10]     Diabetes mellitus ([address] [E11.9]     Chronic respiratory failure with hypoxia (HCC) [J96.11]     Chronic diastolic congestive heart failure (HCC) [I50.32]     Obesity, morbid, BMI

## 2023-04-26 NOTE — CARE COORDINATION
Social Work-Met with pt to discuss dc options Discussed benefit of SNF and discussed that previously he was accepted by 3 facilities. He states that he would be willing to meet with rep from [facility]. Sent referral to [facility]. They approved pt for admission and will meet with pt this afternoon. Pt is on a will call with [facility].  Wesly Howard

## 2023-04-26 NOTE — CARE COORDINATION
Social work: received call from  at [facility] [place] [employer] [phone]. She states she is also encouraging pt to go to snf short term to get more assistance. Pt had [facility] in the past for 2 nursing visits per day. They cannot staff that at this time. No agency will come out 2x daily due to starffing shortages in this area. PT/OT and Blood sugar monitoringg are important per . Thomas follow. [employer] service is also on board to [facility]. Will need jennifer at discharge. Pt is still refusing to her snf. She has encouraged him to go short term.   Lakesha carlisle

## 2023-04-27 VITALS
RESPIRATION RATE: 18 BRPM | DIASTOLIC BLOOD PRESSURE: 59 MMHG | WEIGHT: 315 LBS | HEIGHT: 72 IN | SYSTOLIC BLOOD PRESSURE: 93 MMHG | HEART RATE: 92 BPM | TEMPERATURE: 97.9 F | OXYGEN SATURATION: 97 % | BODY MASS INDEX: 42.66 KG/M2

## 2023-04-27 LAB
ABSOLUTE EOS #: 0.37 K/UL (ref 0–0.44)
ABSOLUTE IMMATURE GRANULOCYTE: 0.04 K/UL (ref 0–0.3)
ABSOLUTE LYMPH #: 0.8 K/UL (ref 1.1–3.7)
ABSOLUTE MONO #: 0.64 K/UL (ref 0.1–1.2)
ALBUMIN SERPL-MCNC: 3 G/DL (ref 3.5–5.2)
ALP SERPL-CCNC: 198 U/L (ref 40–129)
ALT SERPL-CCNC: 14 U/L (ref 5–41)
ANION GAP SERPL CALCULATED.3IONS-SCNC: 7 MMOL/L (ref 9–17)
AST SERPL-CCNC: 16 U/L
BASOPHILS # BLD: 1 % (ref 0–2)
BASOPHILS ABSOLUTE: 0.08 K/UL (ref 0–0.2)
BILIRUB SERPL-MCNC: 0.9 MG/DL (ref 0.3–1.2)
BUN SERPL-MCNC: 13 MG/DL (ref 6–20)
BUN/CREAT BLD: 14 (ref 9–20)
CALCIUM SERPL-MCNC: 8.4 MG/DL (ref 8.6–10.4)
CHLORIDE SERPL-SCNC: 89 MMOL/L (ref 98–107)
CO2 SERPL-SCNC: 39 MMOL/L (ref 20–31)
CREAT SERPL-MCNC: 0.92 MG/DL (ref 0.7–1.2)
EOSINOPHILS RELATIVE PERCENT: 5 % (ref 1–4)
EST. AVERAGE GLUCOSE BLD GHB EST-MCNC: 114 MG/DL
GFR SERPL CREATININE-BSD FRML MDRD: >60 ML/MIN/1.73M2
GLUCOSE BLD-MCNC: 113 MG/DL (ref 75–110)
GLUCOSE SERPL-MCNC: 119 MG/DL (ref 70–99)
HBA1C MFR BLD: 5.6 % (ref 4–6)
HCT VFR BLD AUTO: 30.8 % (ref 40.7–50.3)
HGB BLD-MCNC: 9.2 G/DL (ref 13–17)
IMMATURE GRANULOCYTES: 1 %
LYMPHOCYTES # BLD: 11 % (ref 24–43)
MAGNESIUM SERPL-MCNC: 1.5 MG/DL (ref 1.6–2.6)
MCH RBC QN AUTO: 26.8 PG (ref 25.2–33.5)
MCHC RBC AUTO-ENTMCNC: 29.9 G/DL (ref 28.4–34.8)
MCV RBC AUTO: 89.8 FL (ref 82.6–102.9)
MONOCYTES # BLD: 9 % (ref 3–12)
NRBC AUTOMATED: 0 PER 100 WBC
PDW BLD-RTO: 15.1 % (ref 11.8–14.4)
PLATELET # BLD AUTO: 154 K/UL (ref 138–453)
PMV BLD AUTO: 10 FL (ref 8.1–13.5)
POTASSIUM SERPL-SCNC: 3.2 MMOL/L (ref 3.7–5.3)
PROT SERPL-MCNC: 6.7 G/DL (ref 6.4–8.3)
RBC # BLD: 3.43 M/UL (ref 4.21–5.77)
RBC # BLD: ABNORMAL 10*6/UL
SEG NEUTROPHILS: 73 % (ref 36–65)
SEGMENTED NEUTROPHILS ABSOLUTE COUNT: 5.21 K/UL (ref 1.5–8.1)
SODIUM SERPL-SCNC: 135 MMOL/L (ref 135–144)
WBC # BLD AUTO: 7.1 K/UL (ref 3.5–11.3)

## 2023-04-27 PROCEDURE — 6370000000 HC RX 637 (ALT 250 FOR IP): Performed by: NURSE PRACTITIONER

## 2023-04-27 PROCEDURE — 85025 COMPLETE CBC W/AUTO DIFF WBC: CPT

## 2023-04-27 PROCEDURE — G0378 HOSPITAL OBSERVATION PER HR: HCPCS

## 2023-04-27 PROCEDURE — 80053 COMPREHEN METABOLIC PANEL: CPT

## 2023-04-27 PROCEDURE — 83735 ASSAY OF MAGNESIUM: CPT

## 2023-04-27 PROCEDURE — 36415 COLL VENOUS BLD VENIPUNCTURE: CPT

## 2023-04-27 PROCEDURE — 94660 CPAP INITIATION&MGMT: CPT

## 2023-04-27 PROCEDURE — 82947 ASSAY GLUCOSE BLOOD QUANT: CPT

## 2023-04-27 PROCEDURE — 94761 N-INVAS EAR/PLS OXIMETRY MLT: CPT

## 2023-04-27 PROCEDURE — 99232 SBSQ HOSP IP/OBS MODERATE 35: CPT | Performed by: NURSE PRACTITIONER

## 2023-04-27 PROCEDURE — 2700000000 HC OXYGEN THERAPY PER DAY

## 2023-04-27 PROCEDURE — 2580000003 HC RX 258: Performed by: NURSE PRACTITIONER

## 2023-04-27 RX ADMIN — ANTI-FUNGAL POWDER MICONAZOLE NITRATE TALC FREE: 1.42 POWDER TOPICAL at 08:05

## 2023-04-27 RX ADMIN — INSULIN GLARGINE 75 UNITS: 100 INJECTION, SOLUTION SUBCUTANEOUS at 07:55

## 2023-04-27 RX ADMIN — SODIUM CHLORIDE, PRESERVATIVE FREE 10 ML: 5 INJECTION INTRAVENOUS at 08:05

## 2023-04-27 RX ADMIN — LEVOTHYROXINE SODIUM 175 MCG: 150 TABLET ORAL at 06:41

## 2023-04-27 RX ADMIN — CLONAZEPAM 1 MG: 0.5 TABLET ORAL at 07:57

## 2023-04-27 RX ADMIN — TAMSULOSIN HYDROCHLORIDE 0.4 MG: 0.4 CAPSULE ORAL at 07:54

## 2023-04-27 RX ADMIN — BUMETANIDE 1 MG: 1 TABLET ORAL at 07:53

## 2023-04-27 RX ADMIN — MIDODRINE HYDROCHLORIDE 10 MG: 10 TABLET ORAL at 07:59

## 2023-04-27 RX ADMIN — AMIODARONE HYDROCHLORIDE 200 MG: 200 TABLET ORAL at 08:03

## 2023-04-27 RX ADMIN — OXYCODONE AND ACETAMINOPHEN 1 TABLET: 325; 5 TABLET ORAL at 07:57

## 2023-04-27 RX ADMIN — DULOXETINE HYDROCHLORIDE 60 MG: 60 CAPSULE, DELAYED RELEASE ORAL at 07:54

## 2023-04-27 RX ADMIN — FERROUS SULFATE TAB EC 325 MG (65 MG FE EQUIVALENT) 325 MG: 325 (65 FE) TABLET DELAYED RESPONSE at 07:54

## 2023-04-27 RX ADMIN — PANTOPRAZOLE SODIUM 40 MG: 40 TABLET, DELAYED RELEASE ORAL at 06:41

## 2023-04-27 ASSESSMENT — ENCOUNTER SYMPTOMS
SHORTNESS OF BREATH: 1
CONSTIPATION: 0
WHEEZING: 0
PHOTOPHOBIA: 0
VOMITING: 0
SINUS PAIN: 0
NAUSEA: 0
SINUS PRESSURE: 0
DIARRHEA: 0
COUGH: 0
ABDOMINAL PAIN: 0

## 2023-04-27 ASSESSMENT — PAIN SCALES - GENERAL: PAINLEVEL_OUTOF10: 8

## 2023-04-27 ASSESSMENT — PAIN DESCRIPTION - DESCRIPTORS: DESCRIPTORS: ACHING

## 2023-04-27 ASSESSMENT — PAIN DESCRIPTION - ORIENTATION: ORIENTATION: LOWER

## 2023-04-27 ASSESSMENT — PAIN - FUNCTIONAL ASSESSMENT: PAIN_FUNCTIONAL_ASSESSMENT: PREVENTS OR INTERFERES SOME ACTIVE ACTIVITIES AND ADLS

## 2023-04-27 ASSESSMENT — PAIN DESCRIPTION - LOCATION: LOCATION: BACK

## 2023-04-27 NOTE — CARE COORDINATION
Discharge planning    Patient to go home and in need of bipap vs NIV. Call to [facility] to discuss if would qualify for NIV. Venous BG showing PCO2 69.3 with diagnosis of morbid obesity with BMI> 50     Call to [facility] and [address]  and information given, they will will look into to see if can qualify for NIV. Call from both [facility] and [address]  and they are unable to process a NIV without ABG results. Even with abg results may be difficult as patient would also have to fail bipap.      Sent over sleep study rx, face sheet and documentation to [facility] sleep lab to see if can set up a study

## 2023-04-27 NOTE — PLAN OF CARE
[facility]  Office: [address], DO, Crissy Pattersonh, DO, Ralph Sabrina, DO, Stefanie Jaylyn Anderson, DO, Flores Cardenas MD, Sagar Kearns MD, King Ibarra MD, Robby Pascual MD,  Jeanmarie Brown MD, Ximena Resendez MD, Donny Parks, DO, Renny Hough MD,  Trish Singleton MD, Binu Ng MD, Izzy Boyce DO, Andrew Perez MD, Priya Madrigal MD, Harry Mc DO, Zoila Pickard MD, Jenny Malhotra MD, Savannah Vaughn MD, Pau Cota MD,  Criss Posada DO, Paul Burgos MD,  Chidi Tripp, CNP,  Maryanne Roberts, CNP, Anahi Jarrett, CNP, Lawrence Terrazas, CNP,  Taylor Bryan, [facility], Rossy Box, CNP, Nadine Sanabria, CNP, Anmol Dionna, CNP, Yusef Hui, CNP, Yanique Justin, CNP, Leida Porter PA-C, Omid Parker, CNS, Navdeep Leonardo, CNP, Cony Simmons, [facility]    Second Visit Note  For more detailed information please refer to the progress note of the day      4/27/2023    8:20 AM    Name:   Troy Wood  MRN:     [id number]     [place]:      [de-identified]   Room:   2021/2021-01   Day:  0  Admit Date:  4/24/2023  7:14 AM    PCP:   J Carlos Junior MD  Code Status:  Full Code      Pt vitals were reviewed   New labs were reviewed   Patient was seen    Updated plan :     The problem of recurrent poor sleep is discussed. Avoidance of caffeine sources is strongly encouraged. Sleep hygiene issues are reviewed. Patient will require an outpatient sleep study to evaluate for sleep apnea. This is indicated based upon patient's recurrent respiratory acidosis admissions. Evidence of sleep apnea include daytime sleepiness, headaches in the morning, waking multiple times throughout the night, periods of apnea as witnessed by others during hours of sleep, and loud snoring. Sleep apnea test is warranted and ordered for BiPAP with titration.         SUNSHINE Clayton NP  4/27/2023  8:20 AM

## 2023-04-27 NOTE — CARE COORDINATION
Social Work Left message for [employer]  to see if they would be able to assist with getting pt to  a sleep study. Received phone call from Gabe Bunch and he will be at patient's home. Faxed KAROLYN to Sergio Doyle and phone [phone].  Jeevan Wall

## 2023-04-27 NOTE — PROGRESS NOTES
Advance Care Planning   Healthcare Decision Maker:  Patient refused to fill out Medical Power of [place] document. Patient reports that he is doing just fine at home and that he has caregivers who get him to bed and to the bathroom.  called son Jayy Haq as he was supposed to meet  for family meeting in patient's room at 6:00pm as GERMAIN Martin instructed  yesterday.  spoke to son Jayy Haq by phone and informed his that his father has declined this evening to give sons Medical Power of [place]. Today we referred to ACP Clinical Specialist for assistance. Chaplain James Cornejo aware if issue and need for ethics consult. 04/25/23 1802   Encounter Summary   Service Provided For: Patient   Referral/Consult From: Nurse   Support System Children;Home care staff   Last Encounter  04/25/23   Complexity of Encounter Moderate   Begin Time 1745   End Time  1830   Total Time Calculated 45 min   Encounter    Type Initial Screen/Assessment   Spiritual/Emotional needs   Type Spiritual Support   Advance Care Planning   Type Refused ACP conversation   Assessment/Intervention/Outcome   Assessment Anxious; Compromised coping; Impaired resilience; Impaired social interaction; Interrupted family processes   Intervention Active listening;Discussed belief system/Rastafarian practices/janae; Explored/Affirmed feelings, thoughts, concerns;Nurtured Hope;Prayer (assurance of)/[place];Sustaining Presence/Ministry of presence   Outcome Comfort; Connection/Belonging;Expressed feelings, needs, and concerns;Engaged in conversation;Expressed Gratitude;Receptive
Comprehensive Nutrition Assessment    Type and Reason for Visit:  Initial    Nutrition Recommendations/Plan:   Provide ADULT DIET; Easy to Chew; 4 carb choices (60 gm/meal); 1800 ml  Monitor labs as they become available   Monitor skin integrity/weights     Malnutrition Assessment:  Malnutrition Status:  No malnutrition (04/25/23 1622)    Context:  Acute Illness     Findings of the 6 clinical characteristics of malnutrition:  Energy Intake:  No significant decrease in energy intake  Weight Loss:  No significant weight loss     Body Fat Loss:  No significant body fat loss     Muscle Mass Loss:  No significant muscle mass loss    Fluid Accumulation:  No significant fluid accumulation     Strength:       Nutrition Assessment:    Patient seen at bedside was just discharged 4 days ago and has returned. Has a poor appetite at this time mostly eats jellos and puddings that is all the Nurse states he orders, refuses supplements at this time. no N/V noted, patient is on oxygen and when this was taken off his oxygen levels dropped, Has multiple wounds noteds. Admitted for altered mental status, reason being seen is for wounds. Add Ensure enlive QD to aid with needed extra protein for skin integrity. Nutrition Related Findings:    active bowel sounds no stated last BM, RUE +2 edema, LUE +2 edema, RLE +3 edema LLE +3 edema, has generalized edema noted, documentation suggests he is in the same clothes as the last time he discharged. Continue to monitor HR and follow as needed. Wound Type: Stage III, Multiple       Current Nutrition Intake & Therapies:    Average Meal Intake: 26-50%     ADULT DIET; Easy to Chew; 4 carb choices (60 gm/meal); 1800 ml    Anthropometric Measures:  Height: 6' (182.9 cm)  Ideal Body Weight (IBW): 178 lbs (81 kg)    Admission Body Weight: 425 lb (192.8 kg)  Current Body Weight: 425 lb (192.8 kg), 238.8 % IBW.  Weight Source: Bed Scale  Current BMI (kg/m2): 57.6        Weight Adjustment For: No
Covid 19 swab taken from left nare, labeled, placed in red dot bag, and handed off to second healthcare worker outside of room for transport to laboratory per hospital policy and procedure. Patient tolerated procedure fairly well.
[facility] Nurse  Consult Note       NAME:  Julia Moses  MEDICAL RECORD NUMBER:  [id number]  AGE: 60 y.o. GENDER: male  : 1965  TODAY'S DATE:  2023    Subjective:      Julia Moses is a 60 y.o. male with inpatient referral to Wound Ostomy Continence Specialty for:  Moisture associated skin damage      Wound Identification:  Wound Type:  moisture associated  Contributing Factors: lymphedema, chronic pressure, decreased mobility, shear force, obesity, incontinence of stool, and incontinence of urine    Wound History: the patient has al annabel history  Current Wound Care Treatment:  zinc paste, moisture wicking material in skin folds    Patient Goal of Care:  [x] Wound Healing  [] Odor Control  [] Palliative Care  [] Pain Control   [x] Other: infection prevention        PAST MEDICAL HISTORY        Diagnosis Date    A-fib ([address] 2023    Anxiety and depression     Back pain, chronic     BPH (benign prostatic hyperplasia)     Cellulitis of left lower extremity 2017    Cellulitis of right lower extremity 2017    CHF (congestive heart failure) (HCC)     Chronic respiratory failure with hypoxia (HCC)     prn    Cirrhosis ([address]     Diabetes mellitus ([address]     not checking bloodsugar at home    Epididymoorchitis     GERD (gastroesophageal reflux disease)     H/O cardiac catheterization not sure of date    no stents    Hepatitis     Pt does not know the type.      Hiatal hernia     History of alcohol abuse     Sober since     Hyperlipidemia     not taking any medication    Hypertension     IBS (irritable bowel syndrome)     Incisional hernia with obstruction but no gangrene 2018    Klebsiella sepsis ([address]     Metabolic encephalopathy     Morbid obesity ([address]     Neuropathy     feet,toes    On home oxygen therapy     DME for home oxgygen at 2.5 lpm at HS and as needed    On home oxygen therapy     pt uses 2-3L NC @ home    Pancreatitis     x 5 episodes
Occupational Therapy  Facility/Department: [facility] MED SURG  Occupational Therapy Initial Assessment    Name: Elif Moss  : 1965  MRN: [id number]  Date of Service: 2023    Discharge Recommendations:  Patient would benefit from continued therapy after discharge   Pt currently functioning below baseline. Recommend daily inpatient skilled therapy at time of discharge to maximize long term outcomes and prevent re-admission. Please refer to AM-PAC score for current level of function. RN reports patient is medically stable for therapy treatment this date. Chart reviewed prior to treatment and patient is agreeable for therapy. All lines intact and patient positioned comfortably at end of treatment. All patient needs addressed prior to ending therapy session. Patient Diagnosis(es): The encounter diagnosis was Acute encephalopathy.   Past Medical History:  has a past medical history of A-fib ([address] Anxiety and depression, Back pain, chronic, BPH (benign prostatic hyperplasia), Cellulitis of left lower extremity, Cellulitis of right lower extremity, CHF (congestive heart failure) ([address] Chronic respiratory failure with hypoxia ([address] Cirrhosis ([address] Diabetes mellitus ([address] Epididymoorchitis, GERD (gastroesophageal reflux disease), H/O cardiac catheterization, Hepatitis, Hiatal hernia, History of alcohol abuse, Hyperlipidemia, Hypertension, IBS (irritable bowel syndrome), Incisional hernia with obstruction but no gangrene, Klebsiella sepsis ([address] Metabolic encephalopathy, Morbid obesity ([address] Neuropathy, On home oxygen therapy, On home oxygen therapy, Pancreatitis, Poisoning by insulin and oral hypoglycemic (antidiabetic) drugs, accidental (unintentional), initial encounter, Primary osteoarthritis of right knee, Retention, urine, SBO (small bowel obstruction) ([address] Secondary hypercoagulable state ([address] Septic shock ([address] Sleep apnea, Sleep apnea, obstructive, Thyroid disease, Under care of team,
Patient was receiving patient care as writer entered the room (complaining of SOB; no family members present). Writer provided a silent prayer of healing, comfort, and rest. Writer also left a prayer card as additional support. Spiritual care will maintain daily follow up and support as needed or requested. 04/26/23 1233   Encounter Summary   Service Provided For: Patient   Referral/Consult From: Palliative Care   Last Encounter  04/26/23   Complexity of Encounter Low   Begin Time 1130   End Time  1140   Total Time Calculated 10 min   Palliative Care   Type Palliative Care, Initial/Spiritual Assessment   Assessment/Intervention/Outcome   Assessment Calm;Coping   Intervention Nurtured Hope;Read/Provided Scripture;Sustaining Presence/Ministry of presence   Outcome Expressed Gratitude   Plan and Referrals   Plan/Referrals Continue to visit, (comment); Continue Support (comment)
Per conversation with Carla Mittal, son, patient refuses to move at home. He states the patient has not moved from the chair since he was discharged on Friday, 4/21. He refused any care such as washing up and any care from home care or his son since discharge. RN stated that his skin is quite broken down on his bottom and that the items under him were from when the patient was discharged from the facility Friday. He stated that he refused to get up  and that the reason that his bottom is so bad is that he continues to just scratch it open and let it bleed. Also, asked Carla Mittal if there was any documentation of POA for medical and he stated that his father refused to get it filled out. Requested at discharge if son could follow up with the discharge medications as patient was taking corgard and it was d/c'd. (Pt has blister packs pre-made from pharmacy). He stated that his home care nurse was to take care of that and did not. He stated he will follow up with them. Updated ROSY Younger of this in person.
Physical Therapy  Facility/Department: [facility]  Rehabilitation Physical Therapy Treatment Note    NAME: Steph Durbin  : 1965 (60 y.o.)  MRN: [id number]  CODE STATUS: Full Code    Date of Service: 23       Restrictions:  Restrictions/Precautions: Fall Risk, General Precautions, Up as Tolerated, Bed Alarm  Position Activity Restriction  Other position/activity restrictions: wells- long term wells/ home     SUBJECTIVE  Subjective  Subjective: Pt agreeable for PT today. Pt states he would \"maybe\" consider going to a SNF . OBJECTIVE  Cognition  Overall Cognitive Status: Exceptions  Arousal/Alertness: Appropriate responses to stimuli  Following Commands: Follows multistep commands with repitition  Attention Span: Difficulty attending to directions; Unable to maintain attention  Memory: Decreased recall of biographical Information;Decreased recall of precautions;Decreased recall of recent events;Decreased short term memory  Safety Judgement: Decreased awareness of need for assistance;Decreased awareness of need for safety  Problem Solving: Assistance required to implement solutions;Assistance required to generate solutions;Assistance required to correct errors made;Decreased awareness of errors;Assistance required to identify errors made  Insights: Not aware of deficits  Initiation: Requires cues for all  Sequencing: Requires cues for some  Cognition Comment: Pt. requires constant cues to remain focused on task and positive encouragement to attempt task before stating \"I can't do that! \"  Orientation  Orientation Level: Oriented to person;Disoriented to situation;Oriented to time;Oriented to place    Functional Mobility  Bed Mobility  Overall Assistance Level: Maximum Assistance; Requires x 2 Assistance  Roll Left  Assistance Level: Maximum assistance; Requires x 2 assistance  Roll Right  Assistance Level: Maximum assistance; Requires x 2 assistance                      PT Exercises  Exercise
Physical Therapy  Facility/Department: [facility] SURG  Physical Therapy Initial Assessment    Name: Katharina Barron  : 1965  MRN: [id number]  Date of Service: 2023    Discharge Recommendations:    Pt currently functioning below baseline. Recommend daily inpatient skilled therapy at time of discharge to maximize long term outcomes and prevent re-admission. Please refer to AM-PAC score for current level of function. Patient Diagnosis(es): The primary encounter diagnosis was Acute encephalopathy. Diagnoses of Acute respiratory acidosis (HCC) and Chronic respiratory failure with hypoxia (HCC) were also pertinent to this visit. Past Medical History:  has a past medical history of A-fib ([address] Anxiety and depression, Back pain, chronic, BPH (benign prostatic hyperplasia), Cellulitis of left lower extremity, Cellulitis of right lower extremity, CHF (congestive heart failure) ([address] Chronic respiratory failure with hypoxia ([address] Cirrhosis ([address] Diabetes mellitus ([address] Epididymoorchitis, GERD (gastroesophageal reflux disease), H/O cardiac catheterization, Hepatitis, Hiatal hernia, History of alcohol abuse, Hyperlipidemia, Hypertension, IBS (irritable bowel syndrome), Incisional hernia with obstruction but no gangrene, Klebsiella sepsis ([address] Metabolic encephalopathy, Morbid obesity ([address] Neuropathy, On home oxygen therapy, On home oxygen therapy, Pancreatitis, Poisoning by insulin and oral hypoglycemic (antidiabetic) drugs, accidental (unintentional), initial encounter, Primary osteoarthritis of right knee, Retention, urine, SBO (small bowel obstruction) ([address] Secondary hypercoagulable state ([address] Septic shock ([address] Sleep apnea, Sleep apnea, obstructive, Thyroid disease, Under care of team, Urinary bladder neurogenic dysfunction, and Urinary tract infection associated with indwelling urethral catheter ([address] Past Surgical History:  has a past surgical history that includes Colonoscopy;  Abdomen
Physical Therapy  {PT ALL NOTES:246700}  Sadiq Marroquin is a 60 y.o. Non- / non  male who is well known to our service with multiple recent admissions and a complicated PMH presents with confusion after he activated his life alert and is admitted to the hospital for the management of AMS (altered mental status). Patient is noncompliant with medical treatment/medications at home. Unable to provide any meaningful history during my exam.  When asked if he knew where he was or what year it was he replied no. I also asked if he had been wearing his cpap at home and he also stated no.patient is bed bound and wears continuous home oxygen due to chronic respiratory failure on 3-5L O2.  He is currently on 2L, vitals are stable      CT head unremarkable, Labs stable, tox screen positive for oxycodone which he take at home otherwise negative
Pt discharged to home via lifestar/stretcher in good condition with belongings  Discharge instructions given  Pt denies having any further questions at this time  Locked up home medication(s)/personal items given to patient at discharge  Patient/family state they have everything they were admitted with.
Pt noted to have brought in home dosing of medications. Percocet 7.5/325 and lomotil 2.5/0.025 mg pill bottles were tampersealed by RN and Rafael Briseno pharmacist at nurses station and locked up in patient bin in room.      [address], PharmD  Inpatient Pharmacist  4/24/2023 4:22 PM
[facility] NOTE    Room # 2021/2021-01   Name: Niraj Richard               Reason for visit: [address]    I visited the  RN Sagrario Chow . Admit Date & Time: 4/24/2023  7:14 AM    Assessment:  Niraj Richard is a 60 y.o. male in the hospital because he has altered mental status. Upon entering the room patient is found talking with staff member and confused. Intervention:  I introduced myself and my title as  I offered space for medical staff  to express feelings, needs, and concerns and provided a ministry presence. Spiritual care consulted to complete advance directives as soon as patient is alert and oriented X4.  contacted  Fabián Alvarenga, as RN is requesting Ethics Consult as patient has returned to hospital after he was released four days ago. Patient is still in clothing and undergarments that he was in when he was discharged and is refusing SNF. RN would like sons to have MPOA status. Outcome:  Patient in bed resting. Sons are requesting MPOA documents to be completed Tuesday 4/25/23 at 6pm per RN if patient is A and O.    Plan:  Chaplains will remain available to offer spiritual and emotional support as needed. Electronically signed by Denisse Koroma, on 4/24/2023 at 8:53 PM.  Kenneth      04/24/23 2046   Encounter Summary   Service Provided For: Patient not available   Referral/Consult From: Nurse   Support System Children   Last Encounter  04/24/23   Complexity of Encounter Moderate   Begin Time 1920   End Time  1955   Total Time Calculated 35 min   Encounter    Type Initial Screen/Assessment   Spiritual/Emotional needs   Type Spiritual Support   Advance Care Planning   Type ACP conversation   Assessment/Intervention/Outcome   Assessment Calm; Concerns with suffering; Impaired resilience   Intervention Sustaining Presence/Ministry of
[facility]  Office: [address], DO, Ashish Matthews, DO, Kay Moreno, DO, Nabil Pineda Blood, DO, Jennifer Cristobal MD, Zeina Singleton MD, Niels Ca MD, Mu Barrett MD,  Serge Block MD, Joaquín Thrasher MD, Diane Vinson DO, Minerva Aguirre MD,  Jeison Gagnon MD, Conner Earl MD, Tracie Perea DO, Clarita Solorio MD, Jones Long MD, Rosio Verdugo DO, Kamla Yanes MD, Kenzie Melgar MD, Devang Fierro MD, Irma Soni MD,  Marsha Ramírez DO, Irasi Nayak MD,  Jerod Fuller, CNP,  Gilda Blue, CNP, Rolando Clayton, CNP, Cherie Bill, CNP,  Mccoy Baumgarten, [facility], oDrothea Peraza, CNP, Kaity Rock, CNP, Clint Trejo, CNP, Ольга Kwan, CNP, Prasad Medina, CNP, Sinai Calle PA-C, Tung White, CNS, Yulissa Antunez, [facility], [facility]    Progress Note    4/27/2023    9:08 AM    Name:   Maritza Beach  MRN:     [id number]     Acct:      [de-identified]   Room:   2021/2021-01  IP Day:  0  Admit Date:  4/24/2023  7:14 AM    PCP:   Sade Machado MD  Code Status:  Full Code    Subjective:     C/C:   Chief Complaint   Patient presents with    Altered Mental Status     Interval History Status: not changed. Condition is unchanged. Patient remained in the hospital overnight due to transportation issues. No plans to change discharge at this point. Patient continues to refuse SNF. Concerns are expressed to the patient about his ability to care for himself in the home. Patient is adamant he will not be enrolling in SNF care. At the time my exam and interview patient is uninterested in discussing his medical care. He is advised that he was discharged yesterday and does not wish to have any further testing or treatments done as he is ready to go home. Patient refuses any further care and medically he remained stable for discharge.     Brief History:     Maritza Beach
Updated Carisa, CNP of patient increase in oxygen due to anxiety. Due to AMS, unable to safely order anxiety medications at this time. Pt home dosing of oxygen is from 3-5LNC per [employer]. Will continue to monitor.
Updated ROSY Carisa via perfect serve of the following: patient was taking the old dosing of Corgard 40 mg po daily on top of new medications (metoprolol bid). RN updated Ben Su son of patient condition. To be bringing up his cell phone and cpap machine.
Updated pastoral care Kellie on concerns with patient as well as need for medical POA paperwork discussions. To follow up with Jackson Felty and other sons tomorrow at 18.
[facility]  Office: [address], DO, Josie [facility], DO, Courtney July, DO, Crystal Hutchins Blood, DO, Alberto Mendiola MD, Eliana Montenegro MD, Manisha Holman MD, Sylvie Bernheim, MD,  Mireya Westbrook MD, Juliette Romero MD, Dona Moreira, DO, Millicent Lange MD,  Liz Lugo MD, Ade Painting MD, Teresa Gale, DO, Ivet Tejeda MD, Mikie Long MD, Neema Blanco, DO, Darron Real MD, Levar Mcdermott MD, Gladys Sexton MD, Lisa Moore MD,  Tamara Bowman DO, Brittany Jang MD,  Rosette Sarah CNP,  Meghan Duval, CNP, Chidi Ku, CNP, Mohsen Cannon, CNP,  Chen Dunlap, [facility], Pankaj Case, CNP, Tamara Santana, CNP, Cb Simpson, CNP, Arlene Becker, CNP, Christianne Fung, [facility], Jamari Read PA-C, Mauri Leiva, CNS, Zakiya Alves, CNP, Sharmila Olivia, [facility]    Progress Note    4/25/2023    9:20 AM    Name:   Charisse Pyle  MRN:     [id number]     Acct:      [de-identified]   Room:   2021/2021-01  IP Day:  0  Admit Date:  4/24/2023  7:14 AM    PCP:   Herbert Thorpe MD  Code Status:  Full Code    Subjective:     C/C:   Chief Complaint   Patient presents with    Altered Mental Status     Interval History Status: improved. Sitting up in bed, he is more alert, he is answering questions appropriately. He is somewhat anxious and requesting his Klonopin review resumed. Vital signs stable. Staff reports he is quite unmotivated to do things himself. Brief History:   Charisse Pyle is a 60 y.o. male who is well known to our service with multiple recent admissions and a complicated PMH presents with confusion after he activated his life alert and is admitted to the hospital for the management of AMS (altered mental status). Patient is noncompliant with medical treatment/medications at home.   Unable to provide any meaningful history during my exam.  When asked if he knew where he was or what
Writer called [facility] to notify patient is no longer taking corgard. He is continuing metoprolol only.  Corgard removed from profile there
04/24/2023 08:18 AM    S1QXPNNY 96.5 06/18/2014 12:08 PM    FIO2 32.0 04/24/2023 08:18 AM     Lab Results   Component Value Date/Time    SPECIAL L WRIST 14ML 04/24/2023 08:05 AM     Lab Results   Component Value Date/Time    CULTURE NO GROWTH 2 DAYS 04/24/2023 08:05 AM       Radiology:  XR ABDOMEN (KUB) (SINGLE AP VIEW)    Result Date: 4/19/2023  No acute findings. CT Head W/O Contrast    Result Date: 4/24/2023  Some motion artifacts degrade images. No acute intracranial abnormality. XR CHEST PORTABLE    Result Date: 4/24/2023  Cardiomegaly with diffuse reticular opacities bilateral effusions and superimposed alveolar type opacities favoring interstitial edema over multifocal airspace disease of an infectious for ID. Physical Examination:        General appearance:  alert, anxious(chronic), cooperative at times and no distress  Mental Status:  oriented to person, place and time and normal affect  Lungs:  diminishedto auscultation bilaterally, normal effort  Heart:  regular rate and rhythm, no murmur  Abdomen:  soft, nontender, nondistended, normal bowel sounds, no masses, hepatomegaly, splenomegaly  Extremities: BLE edema(chronic lymphedema), no redness, tenderness in the calves  Skin:  dry flaky, excoriation noted to abdominal folds.  Wound to buttock, no gross lesions, rashes, induration    Assessment:        Hospital Problems             Last Modified POA    * (Principal) AMS (altered mental status) 4/24/2023 Yes    Type 2 diabetes mellitus with diabetic neuropathy, with long-term current use of insulin ([facility] 4/24/2023 Yes    Dyslipidemia 4/24/2023 Yes    FABI (obstructive sleep apnea) 4/24/2023 Yes    Acquired hypothyroidism 4/24/2023 Yes    Iron deficiency anemia due to chronic blood loss 4/24/2023 Yes    Obesity, morbid, BMI 50 or higher (HCC) (Chronic) 4/24/2023 Yes    Chronic indwelling Clemons catheter (Chronic) 4/24/2023 Yes    Anxiety and depression 4/24/2023 Yes    Chronic diastolic congestive
Score        AM-[facility] Inpatient Daily Activity Raw Score: 9 (04/26/23 1330)  AM-PAC Inpatient ADL T-Scale Score : 25.33 (04/26/23 1330)  ADL Inpatient CMS 0-100% Score: 79.59 (04/26/23 1330)  ADL Inpatient CMS G-Code Modifier : CL (04/26/23 1330)      Therapy Time   Individual Concurrent Group Co-treatment   Time In 0940         Time Out 1009         Minutes 34             Co-treatment with PT warranted secondary to decreased safety and independence requiring 2 skilled therapy professionals to address individual discipline's goals. OT addressing preparation for ADL transfer, functional reaching, environmental safety/scanning, functional mobility for ADL transfers, ability to sequence and follow directions, bed mobility tech, and functional UE strength.      Nel No, MICHELLE

## 2023-04-29 LAB
MICROORGANISM SPEC CULT: NORMAL
MICROORGANISM SPEC CULT: NORMAL
SERVICE CMNT-IMP: NORMAL
SERVICE CMNT-IMP: NORMAL
SPECIMEN DESCRIPTION: NORMAL
SPECIMEN DESCRIPTION: NORMAL

## 2023-05-01 NOTE — PROGRESS NOTES
After discussing with patient about the plan for the colonoscopy, patient is now agreeable.
Blood consent was confirmed   Blood was dual signed per writer and an additional RN  Blood touched vein @ 2120  Vitals stable. Vitals were taken within 30 minutes prior to blood administration and 15 minutes after start    Patient was observed for first 15 minutes per writer  No reaction noted.  Will continue to monitor
Blood consent was confirmed   Blood was dual signed per writer and an additional RN  Vitals stable. Vitals were taken within 30 minutes prior to blood administration and 15 minutes after start    Patient was observed for first 15 minutes per writer  No reaction noted. Will continue to monitor      This totals 2 Units given thus far.
Dr Paul Young notified.  States to see the pt in the AM.
KCentra Dose Rounding    KCentra dose is based on Factor IX units. KCentra 500 unit vials do not contain exactly 500 units of Factor IX, but for emergent dose calculations, the pharmacist proceeds as if the vials contain 500 or 1000 units. The purpose of this rounding is to shorten the calculation time, expediting delivery to the patient. The dose rounding should not be clinically significant to the patient. The actual units will be recorded in the Pharmacy log and in this progress note. The recorded dose for administration on 4/18/2023 MAR = 2000units. The actual units administered = 1110 + 1124= 2234  units. Additionally, because of the significant residual in IV tubing, a saline flush is ordered after administration of KCentra. Protocol orders are as follows: When infusion complete flush IV line with 50ml bag of 0.9% Sodium Chloride, infuse at 500 mL/hr. Thank you.   Yamilet Barragan, [address] [place] [address]  4/18/2023 2:30 PM
Patient admitted to room 1026. VS taken, telemetry placed and call light given/within reach. Patient A&O and given room orientation. Orders released/reviewed, initial assessment completed and navigator started. Pt was soiled in bloody linen. Skin is extremely dry, fragile, and swollen. Pt comes to the floor with a chronic wells. Pt is refusing any bowel prep that is ordered. He states he can not just have ice chips. He needs to be able to eat. Pt educated that plans for colonoscopy is tomorrow, he is NPO. Kaleigh Tidwell NP called to bedside to see pt. Orders for blood ordered at 1400 and placed in the sign and held, released upon admission,  Kaleigh Tidwell NP states to give the unit of blood. BP is 90/60. NP aware.
Patient back on unit from procedure.
Patient discharged via ambulance to home with [facility] and all his belongings in stable condition.
Patient had an eventful night. Patient had 1 unit of blood transfused at 0000. Patient had hemoglobin redrawn which resulted 6.3 at 00:11. Patient then received another unit of blood at 0356. Hemoglobin sresulted at 6.1. Writer reached out to GI Dr. Carlin Calero and NP for attending. NP for attending orderd x2 units to be given. Patient is feeling uncomfortable and unable to have a bowel movement thus far. Patient was very anxious throughout the night, writer gave PRN Sree at 0300. Patient has been sleeping since.
Patient off unit for procedure.
Per GI, pt to be switched to clear liquid diet now, NPO except sips with meds at 0700 tomorrow (4/19), and the colonoscopy for AM is to be cancelled per Dr Zia Capellan.
Physician Progress Note      PATIENT:               Brigette Loving  CSN #:                  [id number]  :                       1965  ADMIT DATE:       2023 1:49 PM  Richelle Rodriguez DATE:        2023 10:10 AM  RESPONDING  PROVIDER #:        Avinash SEXTON DO          QUERY TEXT:    Patient admitted with GI bleeding and is on chronic anticoagulation with   Eliquis. If possible, please document in the progress notes and discharge   summary if you are evaluating and/or treating any of the following: The medical record reflects the following:  Risk Factors: PMH of A-fib, recently started on Eliquis. Clinical Indicators: ED Provider Note : brought in by ambulance with   rectal bleeding. Patient was recently discharged from this hospital and   started Eliquis for atrial fibrillation. H&P : Acute GI bleeding. Recent   diagnosis of A-fib/Eliquis- Hold anticoagulation. GI Consult : Per ER, he   arrived with a large amount of maroon colored blood around his bottom. He is   on Eliquis. Urgent EGD. K-Centra ordered through ER. GI PN : He just   finished his 4th unit of blood. He underwent an emergent EGD yesterday which   did not show etiology of upper GI tract blood loss. M  Treatment: 4 units PRBCs, Eliquis held, K-Centra, EGD, Colonoscopy. Thank Shayy Rangel RN, CDS  Options provided:  -- GI Bleeding associated with Eliquis  -- Bleeding unrelated to anticoagulation  -- Other - I will add my own diagnosis  -- Disagree - Not applicable / Not valid  -- Disagree - Clinically unable to determine / Unknown  -- Refer to Clinical Documentation Reviewer    PROVIDER RESPONSE TEXT:    This patient has GI bleeding associated with Eliquis.     Query created by: Altagracia Hernández on 2023 11:55 AM      Electronically signed by:  Avinash SEXTON DO 2023 5:27 PM
Pt continuing to demand water, ice chips offered no water given. Pt also demanding food, education provided and no food given. Pt continues to refuse bowel prep as well, saying that he feels too sick to \"do this right now\" and said he would like to come back at a later date to complete the colonoscopy. Wcm.
Writer was able to get ahold of GI. GI said to start the Golytely prep for colonoscopy. Pt is to be NPO at 11am in plans for colonoscopy.  Pt educated on importance of getting as much of the Golytely down as possible until 11am.
04/19/2023 06:08 AM    RDW 14.4 04/19/2023 06:08 AM     04/19/2023 06:08 AM     04/02/2012 10:04 AM    MPV 9.8 04/19/2023 06:08 AM     Platelets:    Lab Results   Component Value Date/Time     04/19/2023 06:08 AM     04/02/2012 10:04 AM     BMP:    Lab Results   Component Value Date/Time     04/19/2023 06:08 AM    K 4.2 04/19/2023 06:08 AM    CL 94 04/19/2023 06:08 AM    CO2 28 04/19/2023 06:08 AM    BUN 21 04/19/2023 06:08 AM    LABALBU 3.1 04/18/2023 02:08 PM    LABALBU 4.0 03/30/2012 09:23 AM    CREATININE 1.33 04/19/2023 06:08 AM    CALCIUM 8.4 04/19/2023 06:08 AM    GFRAA >60 01/21/2022 04:44 AM    LABGLOM >60 04/19/2023 06:08 AM    GLUCOSE 141 04/19/2023 06:08 AM    GLUCOSE 102 08/30/2011 02:20 PM     Hepatic Function Panel:    Lab Results   Component Value Date/Time    ALKPHOS 339 04/18/2023 02:08 PM    ALT 30 04/18/2023 02:08 PM    AST 27 04/18/2023 02:08 PM    PROT 7.1 04/18/2023 02:08 PM    BILITOT 0.6 04/18/2023 02:08 PM    BILIDIR 0.2 04/18/2023 02:08 PM    IBILI 0.4 04/18/2023 02:08 PM    LABALBU 3.1 04/18/2023 02:08 PM    LABALBU 4.0 03/30/2012 09:23 AM     PT/INR:    Lab Results   Component Value Date/Time    PROTIME 16.5 04/18/2023 02:08 PM    PROTIME 11.6 03/30/2012 09:23 AM    INR 1.3 04/18/2023 02:08 PM         Assessment:   60year old male with acute GI bleed hematochezia yesterday in the setting of etoh cirrhosis history, anemia, and gastric carcinoid hx. He underwent an emergent EGD yesterday which did not show etiology of upper GI tract blood loss, and was a normal exam of esophagus, stomach and duodenum. Plan:   Colonoscopy today at 1500. Prep started around 7am as previously patient had refused. NPO at 11:00  KUB pending. Following hgb result later this am after 4th unit blood done. Following. RN updated as to plans. This plan was formulated in collaboration with Dr. Cheri Murrieta.      BETO Borrego  9:58 AM  4/19/2023
7845  Last data filed at 4/19/2023 0352  Gross per 24 hour   Intake 975 ml   Output --   Net 975 ml       Recent Labs     04/17/23  0303 04/18/23  1408 04/19/23  0011 04/19/23  0405 04/19/23  0608   WBC 12.4* 17.9*  --   --  15.0*   HGB 8.6* 8.9* 6.3* 6.1* 9.2*   HCT 28.4* 30.0* 21.6* 21.1* 31.5*   MCV 85.8 87.7  --   --  91.3   * 180  --   --  129*     Recent Labs     04/17/23  0303 04/18/23  1408 04/19/23  0608   * 129* 132*   K 3.4* 4.2 4.2   CL 92* 89* 94*   CO2 31 31 28   BUN 21* 20 21*   CREATININE 1.29* 1.36* 1.33*       No results for input(s): COLORU, PHUR, LABCAST, WBCUA, RBCUA, MUCUS, TRICHOMONAS, YEAST, BACTERIA, CLARITYU, SPECGRAV, LEUKOCYTESUR, UROBILINOGEN, BILIRUBINUR, BLOODU in the last 72 hours.     Invalid input(s): NITRATE, GLUCOSEUKETONESUAMORPHOUS    Additional Lab/culture results:    Physical Exam:patient is alert, oriented, we discussed the indwelling catheter, the patient states that this catheter has not been irrigated during the hospital stay, Patient readmitted after discharge with rectal bleeding scheduled for colonoscopy he is presently requesting a catheter change catheter was previously replaced about 2 weeks ago    Interval Imaging Findings:    Impression:    Patient Active Problem List   Diagnosis    Alcoholic cirrhosis of liver ([address] sober since 2013    Peripheral edema    Bipolar disorder ([address]    Type 2 diabetes mellitus with diabetic neuropathy, with long-term current use of insulin ([address]    Essential hypertension, currently hypotensive    Dyslipidemia    Weakness    FABI (obstructive sleep apnea)    Primary osteoarthritis of right knee    Type 2 diabetes mellitus with diabetic neuropathy ([address]    Acquired hypothyroidism    Urine retention    Anemia    Slow transit constipation    Iron deficiency anemia due to chronic blood loss    Gastroesophageal reflux disease without esophagitis    Secondary esophageal varices without bleeding ([address]    Portal hypertension ([address]
disease    Urinary bladder neurogenic dysfunction    Urinary tract infection associated with indwelling urethral catheter (HCC)    Musculoskeletal immobility    Pyocystis    Myoclonic jerking    Thrombocytopenia (HCC)    Hypotension    Sepsis with acute hypercapnic respiratory failure and septic shock (HCC)    Acute kidney injury superimposed on chronic kidney disease (HCC)    Somnolence    Acute respiratory acidosis (HCC)    Lymphedema of both lower extremities    Venous stasis    Acute GI bleeding    Paroxysmal atrial fibrillation (HCC)    Secondary hypercoagulable state ([facility]    Anemia due to acute blood loss       Plan: Patient agreeable with catheter change we will proceed accordingly    Antonia England MD  7:42 AM 4/20/2023
12:36 PM    FKL0DCX NOT REPORTED 01/17/2022 11:52 AM    NAZZ9CHU 95 04/13/2023 12:36 PM    A8HHCIGF 96.5 06/18/2014 12:08 PM    FIO2 36.0 04/13/2023 10:31 AM     Lab Results   Component Value Date/Time    SPECIAL L HAND 10ML 01/15/2022 05:00 PM         Radiology:  XR ABDOMEN (KUB) (SINGLE AP VIEW)    Result Date: 4/19/2023  No acute findings.      XR CHEST PORTABLE    Result Date: 4/14/2023  Right basilar opacity could represent right pleural fluid combined with atelectasis or infection       Physical Examination:        General appearance:  alert, cooperative and no distress  Mental Status:  oriented to person, place and time and normal affect  Lungs:  clear to auscultation bilaterally, normal effort  Heart:  regular rate and rhythm, no murmur  Abdomen:  soft, nontender, nondistended, normal bowel sounds, no masses, hepatomegaly, splenomegaly; obese  Extremities:  no redness, tenderness in the calves; chronic lymphedema  Skin:  venous stasis changes    Assessment:        Hospital Problems             Last Modified POA    * (Principal) Acute GI bleeding 4/18/2023 Yes    Type 2 diabetes mellitus with diabetic neuropathy, with long-term current use of insulin ([address] 4/18/2023 Yes    FABI (obstructive sleep apnea) 4/19/2023 Yes    Urine retention 4/19/2023 Yes    Gastroesophageal reflux disease without esophagitis 4/18/2023 Yes    Obesity, morbid, BMI 50 or higher (HCC) (Chronic) 4/19/2023 Yes    Chronic indwelling Clemons catheter (Chronic) 4/18/2023 Yes    BPH (benign prostatic hyperplasia) 4/18/2023 Yes    Chronic diastolic congestive heart failure ([address] 4/18/2023 Yes    Chronic respiratory failure with hypoxia ([address] 4/18/2023 Yes    Overview Addendum 4/18/2023  3:17 PM by SUNSHINE Laureano CNP     Home 02         Thyroid disease 4/18/2023 Yes    Acute kidney injury superimposed on chronic kidney disease ([address] 4/18/2023 Yes    Lymphedema of both lower extremities 4/19/2023 Yes    Venous stasis 4/19/2023 Yes
4/19/2023 Yes    Paroxysmal atrial fibrillation ([facility] 4/19/2023 Yes    Secondary hypercoagulable state ([facility] 4/18/2023 Yes    Anemia due to acute blood loss 4/19/2023 Yes       Plan:        To go for repeat colonoscopy today; egd 4/18 unrevealing  Serial h/h, transfuse as needed; has received 4u prbc so far.   Reduce frequency of h/h  Holding newly started eliquis due to gi bleed (recent diagnosis afib)  D/w Dr Phani Arreguin discharge home later today, refuses snf    Gretchen Speaks Blood, DO  4/20/2023  12:12 PM
unrevealing  However, still no BM despite drinking ~80% of Golytely-may need enema or other med, rn to reach out to GI  Check kub ordered by GI  Serial h/h, transfuse as needed; has received 4u prbc so far  Holding newly started eliquis due to gi bleed (recent diagnosis afib)  Urology eval for wells exchange per patient request    Trent Bobo Blood, DO  4/19/2023  11:52 AM

## 2023-05-17 ENCOUNTER — HOSPITAL ENCOUNTER (INPATIENT)
Age: 58
LOS: 2 days | Discharge: NURSING, REHAB OR LONG-TERM CARE | DRG: 641 | End: 2023-05-19
Attending: EMERGENCY MEDICINE | Admitting: FAMILY MEDICINE
Payer: MEDICARE

## 2023-05-17 ENCOUNTER — APPOINTMENT (OUTPATIENT)
Dept: GENERAL RADIOLOGY | Age: 58
DRG: 641 | End: 2023-05-17
Payer: MEDICARE

## 2023-05-17 DIAGNOSIS — I87.2 VENOUS STASIS DERMATITIS OF BOTH LOWER EXTREMITIES: Chronic | ICD-10-CM

## 2023-05-17 DIAGNOSIS — Z74.09 MUSCULOSKELETAL IMMOBILITY: ICD-10-CM

## 2023-05-17 DIAGNOSIS — E87.1 HYPONATREMIA: ICD-10-CM

## 2023-05-17 DIAGNOSIS — D69.6 THROMBOCYTOPENIA (HCC): ICD-10-CM

## 2023-05-17 DIAGNOSIS — Z78.9 UNABLE TO CARE FOR SELF: Primary | ICD-10-CM

## 2023-05-17 DIAGNOSIS — G62.9 NEUROPATHY: ICD-10-CM

## 2023-05-17 DIAGNOSIS — L03.312 CELLULITIS OF BACK EXCEPT BUTTOCK: ICD-10-CM

## 2023-05-17 DIAGNOSIS — L30.4 INTERTRIGINOUS DERMATITIS ASSOCIATED WITH MOISTURE: ICD-10-CM

## 2023-05-17 LAB
ALBUMIN SERPL-MCNC: 3.1 G/DL (ref 3.5–5.2)
ALP SERPL-CCNC: 178 U/L (ref 40–129)
ALT SERPL-CCNC: 6 U/L (ref 5–41)
ANION GAP SERPL CALCULATED.3IONS-SCNC: 8 MMOL/L (ref 9–17)
AST SERPL-CCNC: 12 U/L
BASOPHILS # BLD: 0.1 K/UL (ref 0–0.2)
BASOPHILS NFR BLD: 1 % (ref 0–2)
BILIRUB DIRECT SERPL-MCNC: 0.2 MG/DL
BILIRUB INDIRECT SERPL-MCNC: 0.2 MG/DL (ref 0–1)
BILIRUB SERPL-MCNC: 0.4 MG/DL (ref 0.3–1.2)
BUN SERPL-MCNC: 23 MG/DL (ref 6–20)
BUN/CREAT SERPL: 21 (ref 9–20)
CALCIUM SERPL-MCNC: 9.4 MG/DL (ref 8.6–10.4)
CHLORIDE SERPL-SCNC: 77 MMOL/L (ref 98–107)
CO2 SERPL-SCNC: 38 MMOL/L (ref 20–31)
CREAT SERPL-MCNC: 1.09 MG/DL (ref 0.7–1.2)
EOSINOPHIL # BLD: 0.31 K/UL (ref 0–0.44)
EOSINOPHILS RELATIVE PERCENT: 3 % (ref 1–4)
ERYTHROCYTE [DISTWIDTH] IN BLOOD BY AUTOMATED COUNT: 14.3 % (ref 11.8–14.4)
GFR SERPL CREATININE-BSD FRML MDRD: >60 ML/MIN/1.73M2
GLUCOSE BLD-MCNC: 100 MG/DL (ref 75–110)
GLUCOSE BLD-MCNC: 104 MG/DL (ref 75–110)
GLUCOSE SERPL-MCNC: 110 MG/DL (ref 70–99)
HCT VFR BLD AUTO: 32.7 % (ref 40.7–50.3)
HGB BLD-MCNC: 9.9 G/DL (ref 13–17)
IMM GRANULOCYTES # BLD AUTO: 0.1 K/UL (ref 0–0.3)
IMM GRANULOCYTES NFR BLD: 1 %
LACTATE BLDV-SCNC: 1.3 MMOL/L (ref 0.5–1.9)
LACTATE BLDV-SCNC: 1.7 MMOL/L (ref 0.5–1.9)
LYMPHOCYTES # BLD: 5 % (ref 24–43)
LYMPHOCYTES NFR BLD: 0.52 K/UL (ref 1.1–3.7)
MCH RBC QN AUTO: 27.1 PG (ref 25.2–33.5)
MCHC RBC AUTO-ENTMCNC: 30.3 G/DL (ref 28.4–34.8)
MCV RBC AUTO: 89.6 FL (ref 82.6–102.9)
MONOCYTES NFR BLD: 0.72 K/UL (ref 0.1–1.2)
MONOCYTES NFR BLD: 7 % (ref 3–12)
NEUTROPHILS NFR BLD: 83 % (ref 36–65)
NEUTS SEG NFR BLD: 8.55 K/UL (ref 1.5–8.1)
NRBC AUTOMATED: 0 PER 100 WBC
PLATELET # BLD AUTO: 145 K/UL (ref 138–453)
PMV BLD AUTO: 9.7 FL (ref 8.1–13.5)
POTASSIUM SERPL-SCNC: 4.8 MMOL/L (ref 3.7–5.3)
PROT SERPL-MCNC: 7.4 G/DL (ref 6.4–8.3)
RBC # BLD AUTO: 3.65 M/UL (ref 4.21–5.77)
SODIUM SERPL-SCNC: 123 MMOL/L (ref 135–144)
TROPONIN I SERPL HS-MCNC: 30 NG/L (ref 0–22)
WBC OTHER # BLD: 10.3 K/UL (ref 3.5–11.3)

## 2023-05-17 PROCEDURE — 6360000002 HC RX W HCPCS: Performed by: EMERGENCY MEDICINE

## 2023-05-17 PROCEDURE — 6370000000 HC RX 637 (ALT 250 FOR IP): Performed by: NURSE PRACTITIONER

## 2023-05-17 PROCEDURE — 84484 ASSAY OF TROPONIN QUANT: CPT

## 2023-05-17 PROCEDURE — 87040 BLOOD CULTURE FOR BACTERIA: CPT

## 2023-05-17 PROCEDURE — 93005 ELECTROCARDIOGRAM TRACING: CPT | Performed by: EMERGENCY MEDICINE

## 2023-05-17 PROCEDURE — 80048 BASIC METABOLIC PNL TOTAL CA: CPT

## 2023-05-17 PROCEDURE — 2580000003 HC RX 258: Performed by: EMERGENCY MEDICINE

## 2023-05-17 PROCEDURE — 6360000002 HC RX W HCPCS: Performed by: NURSE PRACTITIONER

## 2023-05-17 PROCEDURE — 2580000003 HC RX 258: Performed by: NURSE PRACTITIONER

## 2023-05-17 PROCEDURE — 85025 COMPLETE CBC W/AUTO DIFF WBC: CPT

## 2023-05-17 PROCEDURE — 71045 X-RAY EXAM CHEST 1 VIEW: CPT

## 2023-05-17 PROCEDURE — 2500000003 HC RX 250 WO HCPCS: Performed by: NURSE PRACTITIONER

## 2023-05-17 PROCEDURE — 96365 THER/PROPH/DIAG IV INF INIT: CPT

## 2023-05-17 PROCEDURE — 99222 1ST HOSP IP/OBS MODERATE 55: CPT | Performed by: NURSE PRACTITIONER

## 2023-05-17 PROCEDURE — 83605 ASSAY OF LACTIC ACID: CPT

## 2023-05-17 PROCEDURE — 2060000000 HC ICU INTERMEDIATE R&B

## 2023-05-17 PROCEDURE — 36415 COLL VENOUS BLD VENIPUNCTURE: CPT

## 2023-05-17 PROCEDURE — 99285 EMERGENCY DEPT VISIT HI MDM: CPT

## 2023-05-17 PROCEDURE — 82947 ASSAY GLUCOSE BLOOD QUANT: CPT

## 2023-05-17 PROCEDURE — 80076 HEPATIC FUNCTION PANEL: CPT

## 2023-05-17 RX ORDER — SODIUM CHLORIDE 0.9 % (FLUSH) 0.9 %
5-40 SYRINGE (ML) INJECTION EVERY 12 HOURS SCHEDULED
Status: DISCONTINUED | OUTPATIENT
Start: 2023-05-17 | End: 2023-05-19 | Stop reason: HOSPADM

## 2023-05-17 RX ORDER — TROSPIUM CHLORIDE 20 MG/1
20 TABLET, FILM COATED ORAL
Status: DISCONTINUED | OUTPATIENT
Start: 2023-05-18 | End: 2023-05-19 | Stop reason: HOSPADM

## 2023-05-17 RX ORDER — SODIUM CHLORIDE 9 MG/ML
INJECTION, SOLUTION INTRAVENOUS PRN
Status: DISCONTINUED | OUTPATIENT
Start: 2023-05-17 | End: 2023-05-19 | Stop reason: HOSPADM

## 2023-05-17 RX ORDER — MAGNESIUM SULFATE 1 G/100ML
1000 INJECTION INTRAVENOUS PRN
Status: DISCONTINUED | OUTPATIENT
Start: 2023-05-17 | End: 2023-05-19 | Stop reason: HOSPADM

## 2023-05-17 RX ORDER — ONDANSETRON 4 MG/1
4 TABLET, ORALLY DISINTEGRATING ORAL EVERY 8 HOURS PRN
Status: DISCONTINUED | OUTPATIENT
Start: 2023-05-17 | End: 2023-05-19 | Stop reason: HOSPADM

## 2023-05-17 RX ORDER — DEXTROSE MONOHYDRATE 100 MG/ML
INJECTION, SOLUTION INTRAVENOUS CONTINUOUS PRN
Status: DISCONTINUED | OUTPATIENT
Start: 2023-05-17 | End: 2023-05-19 | Stop reason: HOSPADM

## 2023-05-17 RX ORDER — BUMETANIDE 1 MG/1
3 TABLET ORAL 2 TIMES DAILY
Status: DISCONTINUED | OUTPATIENT
Start: 2023-05-17 | End: 2023-05-18

## 2023-05-17 RX ORDER — MIDODRINE HYDROCHLORIDE 10 MG/1
10 TABLET ORAL 3 TIMES DAILY PRN
Status: DISCONTINUED | OUTPATIENT
Start: 2023-05-17 | End: 2023-05-19 | Stop reason: HOSPADM

## 2023-05-17 RX ORDER — ENOXAPARIN SODIUM 100 MG/ML
60 INJECTION SUBCUTANEOUS 2 TIMES DAILY
Status: DISCONTINUED | OUTPATIENT
Start: 2023-05-17 | End: 2023-05-19 | Stop reason: HOSPADM

## 2023-05-17 RX ORDER — ACETAMINOPHEN 650 MG/1
650 SUPPOSITORY RECTAL EVERY 6 HOURS PRN
Status: DISCONTINUED | OUTPATIENT
Start: 2023-05-17 | End: 2023-05-19 | Stop reason: HOSPADM

## 2023-05-17 RX ORDER — SODIUM CHLORIDE 9 MG/ML
INJECTION, SOLUTION INTRAVENOUS CONTINUOUS
Status: DISCONTINUED | OUTPATIENT
Start: 2023-05-17 | End: 2023-05-19 | Stop reason: HOSPADM

## 2023-05-17 RX ORDER — QUETIAPINE FUMARATE 50 MG/1
50 TABLET, EXTENDED RELEASE ORAL NIGHTLY
Status: DISCONTINUED | OUTPATIENT
Start: 2023-05-17 | End: 2023-05-19 | Stop reason: HOSPADM

## 2023-05-17 RX ORDER — FLUCONAZOLE 100 MG/1
200 TABLET ORAL ONCE
Status: DISCONTINUED | OUTPATIENT
Start: 2023-05-17 | End: 2023-05-19 | Stop reason: HOSPADM

## 2023-05-17 RX ORDER — MIDODRINE HYDROCHLORIDE 10 MG/1
10 TABLET ORAL 3 TIMES DAILY PRN
Status: DISCONTINUED | OUTPATIENT
Start: 2023-05-17 | End: 2023-05-17

## 2023-05-17 RX ORDER — LEVOTHYROXINE SODIUM 175 UG/1
175 TABLET ORAL DAILY
Status: DISCONTINUED | OUTPATIENT
Start: 2023-05-18 | End: 2023-05-19 | Stop reason: HOSPADM

## 2023-05-17 RX ORDER — INSULIN GLARGINE 100 [IU]/ML
75 INJECTION, SOLUTION SUBCUTANEOUS 2 TIMES DAILY
Status: DISCONTINUED | OUTPATIENT
Start: 2023-05-17 | End: 2023-05-19 | Stop reason: HOSPADM

## 2023-05-17 RX ORDER — PANTOPRAZOLE SODIUM 40 MG/1
40 TABLET, DELAYED RELEASE ORAL
Status: DISCONTINUED | OUTPATIENT
Start: 2023-05-18 | End: 2023-05-19 | Stop reason: HOSPADM

## 2023-05-17 RX ORDER — SODIUM CHLORIDE 0.9 % (FLUSH) 0.9 %
10 SYRINGE (ML) INJECTION PRN
Status: DISCONTINUED | OUTPATIENT
Start: 2023-05-17 | End: 2023-05-19 | Stop reason: HOSPADM

## 2023-05-17 RX ORDER — AMIODARONE HYDROCHLORIDE 200 MG/1
200 TABLET ORAL 2 TIMES DAILY
Status: DISCONTINUED | OUTPATIENT
Start: 2023-05-17 | End: 2023-05-19 | Stop reason: HOSPADM

## 2023-05-17 RX ORDER — CEPHALEXIN 500 MG/1
500 CAPSULE ORAL EVERY 8 HOURS SCHEDULED
Status: DISCONTINUED | OUTPATIENT
Start: 2023-05-17 | End: 2023-05-19 | Stop reason: HOSPADM

## 2023-05-17 RX ORDER — BUMETANIDE 2 MG/1
2 TABLET ORAL 2 TIMES DAILY
COMMUNITY

## 2023-05-17 RX ORDER — POTASSIUM CHLORIDE 20 MEQ/1
40 TABLET, EXTENDED RELEASE ORAL PRN
Status: DISCONTINUED | OUTPATIENT
Start: 2023-05-17 | End: 2023-05-19 | Stop reason: HOSPADM

## 2023-05-17 RX ORDER — ACETAMINOPHEN 325 MG/1
650 TABLET ORAL EVERY 6 HOURS PRN
Status: DISCONTINUED | OUTPATIENT
Start: 2023-05-17 | End: 2023-05-19 | Stop reason: HOSPADM

## 2023-05-17 RX ORDER — BUMETANIDE 1 MG/1
1 TABLET ORAL 2 TIMES DAILY
Status: DISCONTINUED | OUTPATIENT
Start: 2023-05-17 | End: 2023-05-17

## 2023-05-17 RX ORDER — CLONAZEPAM 0.5 MG/1
1 TABLET ORAL NIGHTLY
Status: DISCONTINUED | OUTPATIENT
Start: 2023-05-17 | End: 2023-05-17

## 2023-05-17 RX ORDER — POLYETHYLENE GLYCOL 3350 17 G/17G
17 POWDER, FOR SOLUTION ORAL DAILY PRN
Status: DISCONTINUED | OUTPATIENT
Start: 2023-05-17 | End: 2023-05-19 | Stop reason: HOSPADM

## 2023-05-17 RX ORDER — ONDANSETRON 2 MG/ML
4 INJECTION INTRAMUSCULAR; INTRAVENOUS EVERY 6 HOURS PRN
Status: DISCONTINUED | OUTPATIENT
Start: 2023-05-17 | End: 2023-05-19 | Stop reason: HOSPADM

## 2023-05-17 RX ORDER — SODIUM CHLORIDE 9 MG/ML
INJECTION, SOLUTION INTRAVENOUS CONTINUOUS
Status: ACTIVE | OUTPATIENT
Start: 2023-05-17 | End: 2023-05-17

## 2023-05-17 RX ORDER — POTASSIUM CHLORIDE 7.45 MG/ML
10 INJECTION INTRAVENOUS PRN
Status: DISCONTINUED | OUTPATIENT
Start: 2023-05-17 | End: 2023-05-19 | Stop reason: HOSPADM

## 2023-05-17 RX ORDER — CLONAZEPAM 0.5 MG/1
1 TABLET ORAL 2 TIMES DAILY
Status: DISCONTINUED | OUTPATIENT
Start: 2023-05-17 | End: 2023-05-19 | Stop reason: HOSPADM

## 2023-05-17 RX ORDER — TAMSULOSIN HYDROCHLORIDE 0.4 MG/1
0.4 CAPSULE ORAL DAILY
Status: DISCONTINUED | OUTPATIENT
Start: 2023-05-17 | End: 2023-05-19 | Stop reason: HOSPADM

## 2023-05-17 RX ADMIN — ANTI-FUNGAL POWDER MICONAZOLE NITRATE TALC FREE: 1.42 POWDER TOPICAL at 21:48

## 2023-05-17 RX ADMIN — CEPHALEXIN 500 MG: 500 CAPSULE ORAL at 16:56

## 2023-05-17 RX ADMIN — ENOXAPARIN SODIUM 60 MG: 100 INJECTION SUBCUTANEOUS at 21:47

## 2023-05-17 RX ADMIN — SODIUM CHLORIDE: 9 INJECTION, SOLUTION INTRAVENOUS at 19:17

## 2023-05-17 RX ADMIN — VANCOMYCIN HYDROCHLORIDE 2500 MG: 5 INJECTION, POWDER, LYOPHILIZED, FOR SOLUTION INTRAVENOUS at 15:08

## 2023-05-17 RX ADMIN — AMIODARONE HYDROCHLORIDE 200 MG: 200 TABLET ORAL at 21:48

## 2023-05-17 RX ADMIN — CLONAZEPAM 1 MG: 0.5 TABLET ORAL at 21:48

## 2023-05-17 RX ADMIN — SODIUM CHLORIDE: 9 INJECTION, SOLUTION INTRAVENOUS at 15:00

## 2023-05-17 RX ADMIN — BUMETANIDE 3 MG: 1 TABLET ORAL at 21:48

## 2023-05-17 ASSESSMENT — ENCOUNTER SYMPTOMS
VOMITING: 0
SHORTNESS OF BREATH: 0
WHEEZING: 0
BACK PAIN: 1
PHOTOPHOBIA: 0
ABDOMINAL PAIN: 0
COUGH: 0
NAUSEA: 0
CONSTIPATION: 0
DIARRHEA: 1
SINUS PAIN: 0
SINUS PRESSURE: 0

## 2023-05-17 ASSESSMENT — PAIN DESCRIPTION - LOCATION
LOCATION: BACK;BUTTOCKS
LOCATION: BUTTOCKS

## 2023-05-17 ASSESSMENT — PAIN DESCRIPTION - ORIENTATION: ORIENTATION: MID

## 2023-05-17 ASSESSMENT — PAIN SCALES - GENERAL
PAINLEVEL_OUTOF10: 8
PAINLEVEL_OUTOF10: 6

## 2023-05-17 ASSESSMENT — PAIN DESCRIPTION - DESCRIPTORS: DESCRIPTORS: ACHING;SORE

## 2023-05-17 ASSESSMENT — PAIN - FUNCTIONAL ASSESSMENT
PAIN_FUNCTIONAL_ASSESSMENT: PREVENTS OR INTERFERES SOME ACTIVE ACTIVITIES AND ADLS
PAIN_FUNCTIONAL_ASSESSMENT: 0-10

## 2023-05-17 NOTE — ED NOTES
ED to inpatient nurses report     Chief Complaint   Patient presents with    Leg Swelling      Present to ED from home  LOC: alert and orientated to name, place, date  Vital signs   Vitals:    05/17/23 1442 05/17/23 1452 05/17/23 1502 05/17/23 1512   BP:   (!) 116/94    Pulse: 54 54 53 53   Resp:       Temp:       SpO2:    100%   Weight:       Height:          Oxygen Baseline 2L NC    Current needs required none   SEPSIS:   [] Lactate X 2 ordered (Yes or No)  [] Antibiotics given (Yes or No)  [] IV Fluids ordered (Yes or No)             [] 2nd IV completed (Yes or No)  [] Hourly Vital Signs (Validated)  [] Outstanding Orders:     LDAs:   Peripheral IV 05/17/23 Right Antecubital (Active)       Peripheral IV 05/17/23 Distal;Right Forearm (Active)     Mobility: Fully dependent  Fall Risk: [place] 1 Fall Risk  Presents to emergency department  because of falls (Syncope, seizure, or loss of consciousness): No (05/17/23 1403)  Age > 79: No (05/17/23 1403)  Altered Mental Status, Intoxication with alcohol or substance confusion (Disorientation, impaired judgment, poor safety awaremess, or inability to follow instructions): No (05/17/23 1403)  Impaired Mobility: Ambulates or transfers with assistive devices or assistance;  Unable to ambulate or transer.: Yes (05/17/23 1403)  Nursing Judgement: Yes (05/17/23 1403)  Pending ED orders: none  Present condition: fair  Code Status: unknown  Consults: PHARMACY TO DOSE VANCOMYCIN  IP CONSULT TO INTERNAL MEDICINE  []  Hospitalist  Completed  [] yes [] no Who:   []  Medicine  Completed  [] yes [] No Who:   []  Cardiology  Completed  [] yes [] No Who:   []  GI   Completed  [] yes [] No Who:   []  Neurology  Completed  [] yes [] No Who:   []  Nephrology Completed  [] yes [] No Who:    []  Vascular  Completed  [] yes [] No Who:   []  Ortho  Completed  [] yes [] No Who:     []  Surgery  Completed  [] yes [] No Who:    []  Urology  Completed  [] yes [] No Who:    []  CT Surgery Completed

## 2023-05-17 NOTE — ED NOTES
Pt refused Diflucan medication when this RN went to administer. Pt states to family member who is at bedside, \"Im not going to take every time they tell me to take something! \". ED MD made aware of situation.       Carolina Warren RN  05/17/23 1700

## 2023-05-17 NOTE — DISCHARGE INSTR - COC
Continuity of Care Form    Patient Name: Danya Acosta   :  1965  MRN:  [id number]    Admit date:  2023  Discharge date:  2023    Code Status Order: Prior   Advance Directives:     Admitting Physician:  Brain Caceres MD  PCP: Ramón Velez MD    Discharging Nurse: [facility] Unit/Room#: STA27/27  Discharging Unit Phone Number: [phone]    Emergency Contact:   Extended Emergency Contact Information  Primary Emergency Contact: Jyotita Safer [place] of [address]  Phone: [phone]  Relation: Child  Secondary Emergency Contact: Mary Ramírez of [facility] Phone: [phone]  Work Phone: [phone]  Mobile Phone: [phone]  Relation: Child    Past Surgical History:  Past Surgical History:   Procedure Laterality Date    ABDOMEN SURGERY      hernia repair x4 (ventral)    COLONOSCOPY          COLONOSCOPY N/A 2023    COLONOSCOPY DIAGNOSTIC performed by Jim Kat MD at [address] [place] [address]  2023    COLONOSCOPY N/A 2023    COLONOSCOPY DIAGNOSTIC performed by Jim Kat MD at [address]  2018    CYSTOSCOPY  2019    CYSTOSCOPY N/A 2019    CYSTOSCOPY DILATION performed by Faith Ceja MD at [address] N/A 2019    CYSTOSCOPY/ DILATION NICOLE CATHETER EXCHANGE performed by Faith Ceja MD at [address] N/A 2022    CYSTOSCOPY, REMOVAL OF SMALL BLADDER STONE AND BLADDER IRRIGATION performed by Faith Ceja MD at [facility], COL[facility], [address] [facility]  2018    repair and reduction of recurrent incarcerated incisional hernia with mesh    [place] CYSTOURETHROSCOPY N/A 2018    CYSTOSCOPY Efrain Rodriguez performed by Faith Ceja MD at [address] [place] [address] EGD TRANSORAL BIOPSY SINGLE/MULTIPLE N/A 2017    EGD BIOPSY performed by Isaiah Mc MD at [address] [place] [address] I&D [address] [place] [address] BURSAL SPACE Bilateral

## 2023-05-17 NOTE — ED PROVIDER NOTES
EMERGENCY DEPARTMENT ENCOUNTER    Pt Name: Jordan Guillory  MRN: [id number]  [place] 1965  Date of evaluation: 5/17/23  CHIEF COMPLAINT       Chief Complaint   Patient presents with    Leg Swelling     HISTORY OF PRESENT ILLNESS   69-year-old male presents the emergency room by EMS after home nurse was out at his residence today and had concerns about his ability to care for self and noticed wounds to his back. Patient has complex medical history. He reports that he gets home health aides currently and he was frustrated that today he was being sent to the emergency room. He reports he has no major complaints. He does have some pain to his back and buttocks. REVIEW OF SYSTEMS     Review of Systems   Constitutional:  Positive for fatigue. Musculoskeletal:  Positive for back pain. Skin:  Positive for wound. Neurological:  Positive for weakness. PASTMEDICAL HISTORY     Past Medical History:   Diagnosis Date    A-fib ([address] 4/18/2023    Anxiety and depression     Back pain, chronic     BPH (benign prostatic hyperplasia)     Cellulitis of left lower extremity 08/21/2017    Cellulitis of right lower extremity 08/19/2017    CHF (congestive heart failure) (HCC)     Chronic respiratory failure with hypoxia (HCC)     prn    Cirrhosis ([address]     Diabetes mellitus ([address]     not checking bloodsugar at home    Epididymoorchitis     GERD (gastroesophageal reflux disease)     H/O cardiac catheterization not sure of date    no stents    Hepatitis     Pt does not know the type.      Hiatal hernia     History of alcohol abuse     Sober since 2013    Hyperlipidemia     not taking any medication    Hypertension     IBS (irritable bowel syndrome)     Incisional hernia with obstruction but no gangrene 05/20/2018    Klebsiella sepsis ([address] 19/00/5449    Metabolic encephalopathy     Morbid obesity ([address]     Neuropathy     feet,toes    On home oxygen therapy     DME for home oxgygen at 2.5 lpm at HS and as needed    On

## 2023-05-17 NOTE — H&P
- 12 %    Eosinophils % 3 1 - 4 %    Basophils 1 0 - 2 %    Immature Granulocytes 1 (H) 0 %    Segs Absolute 8.55 (H) 1.50 - 8.10 k/uL    Absolute Lymph # 0.52 (L) 1.10 - 3.70 k/uL    Absolute Mono # 0.72 0.10 - 1.20 k/uL    Absolute Eos # 0.31 0.00 - 0.44 k/uL    Basophils Absolute 0.10 0.00 - 0.20 k/uL    Absolute Immature Granulocyte 0.10 0.00 - 0.30 k/uL   BMP    Collection Time: 05/17/23  2:17 PM   Result Value Ref Range    Glucose 110 (H) 70 - 99 mg/dL    BUN 23 (H) 6 - 20 mg/dL    Creatinine 1.09 0.70 - 1.20 mg/dL    Est, Glom Filt Rate >60 >60 mL/min/1.73m2    Bun/Cre Ratio 21 (H) 9 - 20    Calcium 9.4 8.6 - 10.4 mg/dL    Sodium 123 (L) 135 - 144 mmol/L    Potassium 4.8 3.7 - 5.3 mmol/L    Chloride 77 (L) 98 - 107 mmol/L    CO2 38 (H) 20 - 31 mmol/L    Anion Gap 8 (L) 9 - 17 mmol/L   Hepatic Function Panel    Collection Time: 05/17/23  2:17 PM   Result Value Ref Range    Albumin 3.1 (L) 3.5 - 5.2 g/dL    Alkaline Phosphatase 178 (H) 40 - 129 U/L    ALT 6 5 - 41 U/L    AST 12 <40 U/L    Total Bilirubin 0.4 0.3 - 1.2 mg/dL    Bilirubin, Direct 0.2 <0.3 mg/dL    Bilirubin, Indirect 0.2 0.0 - 1.0 mg/dL    Total Protein 7.4 6.4 - 8.3 g/dL   EKG 12 Lead    Collection Time: 05/17/23  2:58 PM   Result Value Ref Range    Ventricular Rate 53 BPM    Atrial Rate 53 BPM    P-R Interval 192 ms    QRS Duration 176 ms    Q-T Interval 522 ms    QTc Calculation (Bazett) 489 ms    P Axis 32 degrees    R Axis -42 degrees    T Axis 33 degrees       Imaging/Diagnostics:  XR CHEST PORTABLE    Result Date: 5/17/2023  No acute process.  Stable cardiomegaly Bibasilar hypoaeration       Assessment :      Hospital Problems             Last Modified POA    * (Principal) Hyponatremia 5/17/2023 Yes    Bipolar disorder ([address] 5/17/2023 Yes    Dyslipidemia 5/17/2023 Yes    Type 2 diabetes mellitus with diabetic neuropathy (HCC) (Chronic) 5/17/2023 Yes    GERD (gastroesophageal reflux disease) 5/17/2023 Yes    Hypertension 5/17/2023 Yes

## 2023-05-17 NOTE — CARE COORDINATION
Case Management Assessment  Initial Evaluation    Date/Time of Evaluation: 5/17/2023 4:50 PM  Assessment Completed by: LEILA Burnham    If patient is discharged prior to next notation, then this note serves as note for discharge by case management. Patient Name: Vicky Gibson                   YOB: 1965  Diagnosis: Hyponatremia [E87.1]                   Date / Time: 5/17/2023  1:54 PM    Patient Admission Status: Inpatient   Readmission Risk (Low < 19, Mod (19-27), High > 27): Readmission Risk Score: 22.6    Current PCP: Jayme Villanueva MD  PCP verified by CM? Yes    Chart Reviewed: Yes      History Provided by: Patient  Patient Orientation: Alert and Oriented    Patient Cognition: Alert    Hospitalization in the last 30 days (Readmission):  Yes    If yes, Readmission Assessment in  Navigator will be completed.     Advance Directives:      Code Status: Prior   Patient's Primary Decision Maker is: Legal Next of Kin    Primary Decision Maker: Igor Dimas - Child - [phone]    Primary Decision Maker: Raheem Fuller - Child - [phone]    Discharge Planning:    Patient lives with: Alone Type of Home: House  Primary Care Giver: Self  Patient Support Systems include: [employer]   Current Financial resources: Medicare  Current community resources: None  Current services prior to admission: Durable Medical Equipment, Oxygen Therapy, Home Care ([address], 2[address] O2)            Current DME: Oxygen Therapy (Comment), Walker, Shower Chair            Type of Home Care services:  Nursing Services    ADLS  Prior functional level: Assistance with the following:, Bathing, Dressing, Toileting, Housework, Cooking, Shopping, Mobility  Current functional level: Assistance with the following:, Bathing, Dressing, Toileting, Cooking, Housework, Shopping, Mobility    PT AM-PAC:   /24  OT AM-PAC:   /24    Family can provide assistance at DC: No  Would you like Case Management to discuss the discharge

## 2023-05-18 LAB
AMORPH SED URNS QL MICRO: ABNORMAL
ANION GAP SERPL CALCULATED.3IONS-SCNC: 5 MMOL/L (ref 9–17)
BASOPHILS # BLD: 0 K/UL (ref 0–0.2)
BASOPHILS NFR BLD: 0 %
BILIRUB UR QL STRIP: NEGATIVE
BUN SERPL-MCNC: 21 MG/DL (ref 6–20)
BUN/CREAT SERPL: 19 (ref 9–20)
CALCIUM SERPL-MCNC: 8.6 MG/DL (ref 8.6–10.4)
CHLORIDE SERPL-SCNC: 83 MMOL/L (ref 98–107)
CLARITY UR: CLEAR
CO2 SERPL-SCNC: 42 MMOL/L (ref 20–31)
COLOR UR: YELLOW
CREAT SERPL-MCNC: 1.08 MG/DL (ref 0.7–1.2)
EKG ATRIAL RATE: 53 BPM
EKG P AXIS: 32 DEGREES
EKG P-R INTERVAL: 192 MS
EKG Q-T INTERVAL: 522 MS
EKG QRS DURATION: 176 MS
EKG QTC CALCULATION (BAZETT): 489 MS
EKG R AXIS: -42 DEGREES
EKG T AXIS: 33 DEGREES
EKG VENTRICULAR RATE: 53 BPM
EOSINOPHIL # BLD: 0.22 K/UL (ref 0–0.4)
EOSINOPHILS RELATIVE PERCENT: 3 % (ref 1–4)
EPI CELLS #/AREA URNS HPF: ABNORMAL /HPF (ref 0–5)
ERYTHROCYTE [DISTWIDTH] IN BLOOD BY AUTOMATED COUNT: 14.3 % (ref 11.8–14.4)
GFR SERPL CREATININE-BSD FRML MDRD: >60 ML/MIN/1.73M2
GLUCOSE BLD-MCNC: 121 MG/DL (ref 75–110)
GLUCOSE BLD-MCNC: 142 MG/DL (ref 75–110)
GLUCOSE BLD-MCNC: 172 MG/DL (ref 75–110)
GLUCOSE BLD-MCNC: 77 MG/DL (ref 75–110)
GLUCOSE SERPL-MCNC: 82 MG/DL (ref 70–99)
GLUCOSE UR STRIP.AUTO-MCNC: NEGATIVE MG/DL
HCT VFR BLD AUTO: 29.4 % (ref 40.7–50.3)
HGB BLD-MCNC: 8.8 G/DL (ref 13–17)
HGB UR QL STRIP.AUTO: ABNORMAL
IMM GRANULOCYTES # BLD AUTO: 0.07 K/UL (ref 0–0.3)
IMM GRANULOCYTES NFR BLD: 1 %
KETONES UR STRIP.AUTO-MCNC: NEGATIVE MG/DL
LEUKOCYTE ESTERASE UR QL STRIP: ABNORMAL
LYMPHOCYTES # BLD: 6 % (ref 24–44)
LYMPHOCYTES NFR BLD: 0.44 K/UL (ref 1–4.8)
MCH RBC QN AUTO: 26.8 PG (ref 25.2–33.5)
MCHC RBC AUTO-ENTMCNC: 29.9 G/DL (ref 28.4–34.8)
MCV RBC AUTO: 89.6 FL (ref 82.6–102.9)
MONOCYTES NFR BLD: 0.44 K/UL (ref 0.2–0.8)
MONOCYTES NFR BLD: 6 % (ref 1–7)
NEUTROPHILS NFR BLD: 84 % (ref 36–66)
NEUTS SEG NFR BLD: 6.23 K/UL (ref 1.8–7.7)
NITRITE UR QL STRIP: NEGATIVE
NRBC AUTOMATED: 0 PER 100 WBC
PLATELET # BLD AUTO: 125 K/UL (ref 138–453)
PMV BLD AUTO: 9.7 FL (ref 8.1–13.5)
POTASSIUM SERPL-SCNC: 4.7 MMOL/L (ref 3.7–5.3)
PROT UR STRIP-MCNC: NEGATIVE MG/DL
PROT UR STRIP.AUTO-MCNC: 6 MG/DL (ref 5–8)
RBC # BLD AUTO: 3.28 M/UL (ref 4.21–5.77)
RBC #/AREA URNS HPF: ABNORMAL /HPF (ref 0–2)
SODIUM SERPL-SCNC: 130 MMOL/L (ref 135–144)
SP GR UR STRIP.AUTO: 1.01 (ref 1–1.03)
UROBILINOGEN UR STRIP-ACNC: NORMAL
WBC #/AREA URNS HPF: ABNORMAL /HPF (ref 0–5)
WBC OTHER # BLD: 7.4 K/UL (ref 3.5–11.3)

## 2023-05-18 PROCEDURE — 97535 SELF CARE MNGMENT TRAINING: CPT

## 2023-05-18 PROCEDURE — 81001 URINALYSIS AUTO W/SCOPE: CPT

## 2023-05-18 PROCEDURE — 85025 COMPLETE CBC W/AUTO DIFF WBC: CPT

## 2023-05-18 PROCEDURE — 6370000000 HC RX 637 (ALT 250 FOR IP): Performed by: NURSE PRACTITIONER

## 2023-05-18 PROCEDURE — 82947 ASSAY GLUCOSE BLOOD QUANT: CPT

## 2023-05-18 PROCEDURE — 2580000003 HC RX 258: Performed by: NURSE PRACTITIONER

## 2023-05-18 PROCEDURE — 99232 SBSQ HOSP IP/OBS MODERATE 35: CPT | Performed by: NURSE PRACTITIONER

## 2023-05-18 PROCEDURE — 97530 THERAPEUTIC ACTIVITIES: CPT

## 2023-05-18 PROCEDURE — 94640 AIRWAY INHALATION TREATMENT: CPT

## 2023-05-18 PROCEDURE — 2060000000 HC ICU INTERMEDIATE R&B

## 2023-05-18 PROCEDURE — 6360000002 HC RX W HCPCS: Performed by: NURSE PRACTITIONER

## 2023-05-18 PROCEDURE — 2700000000 HC OXYGEN THERAPY PER DAY

## 2023-05-18 PROCEDURE — 93010 ELECTROCARDIOGRAM REPORT: CPT | Performed by: INTERNAL MEDICINE

## 2023-05-18 PROCEDURE — 80048 BASIC METABOLIC PNL TOTAL CA: CPT

## 2023-05-18 PROCEDURE — 97110 THERAPEUTIC EXERCISES: CPT

## 2023-05-18 PROCEDURE — 36415 COLL VENOUS BLD VENIPUNCTURE: CPT

## 2023-05-18 PROCEDURE — 97163 PT EVAL HIGH COMPLEX 45 MIN: CPT

## 2023-05-18 PROCEDURE — 97167 OT EVAL HIGH COMPLEX 60 MIN: CPT

## 2023-05-18 RX ORDER — OXYCODONE HYDROCHLORIDE 5 MG/1
5 TABLET ORAL EVERY 4 HOURS PRN
Status: DISCONTINUED | OUTPATIENT
Start: 2023-05-18 | End: 2023-05-19 | Stop reason: HOSPADM

## 2023-05-18 RX ORDER — ALBUTEROL SULFATE 90 UG/1
2 AEROSOL, METERED RESPIRATORY (INHALATION) EVERY 4 HOURS PRN
Status: DISCONTINUED | OUTPATIENT
Start: 2023-05-18 | End: 2023-05-19 | Stop reason: HOSPADM

## 2023-05-18 RX ORDER — BUMETANIDE 1 MG/1
2 TABLET ORAL 2 TIMES DAILY
Status: DISCONTINUED | OUTPATIENT
Start: 2023-05-18 | End: 2023-05-19 | Stop reason: HOSPADM

## 2023-05-18 RX ADMIN — INSULIN GLARGINE 75 UNITS: 100 INJECTION, SOLUTION SUBCUTANEOUS at 21:07

## 2023-05-18 RX ADMIN — CLONAZEPAM 1 MG: 0.5 TABLET ORAL at 11:30

## 2023-05-18 RX ADMIN — CEPHALEXIN 500 MG: 500 CAPSULE ORAL at 21:00

## 2023-05-18 RX ADMIN — TAMSULOSIN HYDROCHLORIDE 0.4 MG: 0.4 CAPSULE ORAL at 11:31

## 2023-05-18 RX ADMIN — AMIODARONE HYDROCHLORIDE 200 MG: 200 TABLET ORAL at 20:20

## 2023-05-18 RX ADMIN — BUMETANIDE 3 MG: 1 TABLET ORAL at 11:31

## 2023-05-18 RX ADMIN — BUMETANIDE 2 MG: 1 TABLET ORAL at 20:20

## 2023-05-18 RX ADMIN — QUETIAPINE FUMARATE 50 MG: 50 TABLET, EXTENDED RELEASE ORAL at 20:20

## 2023-05-18 RX ADMIN — OXYCODONE 5 MG: 5 TABLET ORAL at 16:01

## 2023-05-18 RX ADMIN — CLONAZEPAM 1 MG: 0.5 TABLET ORAL at 20:20

## 2023-05-18 RX ADMIN — ALBUTEROL SULFATE 2 PUFF: 90 AEROSOL, METERED RESPIRATORY (INHALATION) at 16:16

## 2023-05-18 RX ADMIN — TROSPIUM CHLORIDE 20 MG: 20 TABLET, FILM COATED ORAL at 15:13

## 2023-05-18 RX ADMIN — SODIUM CHLORIDE: 9 INJECTION, SOLUTION INTRAVENOUS at 16:03

## 2023-05-18 RX ADMIN — CEPHALEXIN 500 MG: 500 CAPSULE ORAL at 05:08

## 2023-05-18 RX ADMIN — SODIUM CHLORIDE, PRESERVATIVE FREE 10 ML: 5 INJECTION INTRAVENOUS at 09:58

## 2023-05-18 RX ADMIN — LEVOTHYROXINE SODIUM 175 MCG: 0.17 TABLET ORAL at 05:08

## 2023-05-18 RX ADMIN — OXYCODONE 5 MG: 5 TABLET ORAL at 21:00

## 2023-05-18 RX ADMIN — CEPHALEXIN 500 MG: 500 CAPSULE ORAL at 13:49

## 2023-05-18 RX ADMIN — PANTOPRAZOLE SODIUM 40 MG: 40 TABLET, DELAYED RELEASE ORAL at 05:08

## 2023-05-18 RX ADMIN — ANTI-FUNGAL POWDER MICONAZOLE NITRATE TALC FREE: 1.42 POWDER TOPICAL at 09:58

## 2023-05-18 RX ADMIN — TROSPIUM CHLORIDE 20 MG: 20 TABLET, FILM COATED ORAL at 05:08

## 2023-05-18 RX ADMIN — AMIODARONE HYDROCHLORIDE 200 MG: 200 TABLET ORAL at 11:31

## 2023-05-18 ASSESSMENT — PAIN DESCRIPTION - FREQUENCY: FREQUENCY: CONTINUOUS

## 2023-05-18 ASSESSMENT — PAIN - FUNCTIONAL ASSESSMENT: PAIN_FUNCTIONAL_ASSESSMENT: PREVENTS OR INTERFERES SOME ACTIVE ACTIVITIES AND ADLS

## 2023-05-18 ASSESSMENT — PAIN DESCRIPTION - LOCATION: LOCATION: BUTTOCKS;KNEE

## 2023-05-18 ASSESSMENT — PAIN SCALES - GENERAL
PAINLEVEL_OUTOF10: 8
PAINLEVEL_OUTOF10: 8

## 2023-05-18 ASSESSMENT — PAIN DESCRIPTION - PAIN TYPE: TYPE: ACUTE PAIN

## 2023-05-18 ASSESSMENT — PAIN DESCRIPTION - ORIENTATION: ORIENTATION: MID

## 2023-05-18 ASSESSMENT — PAIN DESCRIPTION - DESCRIPTORS: DESCRIPTORS: ACHING;SORE

## 2023-05-18 NOTE — RT PROTOCOL NOTE
RT Inhaler-Nebulizer Bronchodilator Protocol Note    There is a bronchodilator order in the chart from a provider indicating to follow the RT Bronchodilator Protocol and there is an Initiate RT Inhaler-Nebulizer Bronchodilator Protocol order as well (see protocol at bottom of note). CXR Findings:  XR CHEST PORTABLE    Result Date: 5/17/2023  No acute process. Stable cardiomegaly Bibasilar hypoaeration       The findings from the last RT Protocol Assessment were as follows:   History Pulmonary Disease: None or smoker <15 pack years  Respiratory Pattern: Regular pattern and RR 12-20 bpm  Breath Sounds: Slightly diminished and/or crackles  Cough: Strong, spontaneous, non-productive  Indication for Bronchodilator Therapy: On home bronchodilators  Bronchodilator Assessment Score: 2    Aerosolized bronchodilator medication orders have been revised according to the RT Inhaler-Nebulizer Bronchodilator Protocol below. Respiratory Therapist to perform RT Therapy Protocol Assessment initially then follow the protocol. Repeat RT Therapy Protocol Assessment PRN for score 0-3 or on second treatment, BID, and PRN for scores above 3. No Indications - adjust the frequency to every 6 hours PRN wheezing or bronchospasm, if no treatments needed after 48 hours then discontinue using Per Protocol order mode. If indication present, adjust the RT bronchodilator orders based on the Bronchodilator Assessment Score as indicated below. Use Inhaler orders unless patient has one or more of the following: on home nebulizer, not able to hold breath for 10 seconds, is not alert and oriented, cannot activate and use MDI correctly, or respiratory rate 25 breaths per minute or more, then use the equivalent nebulizer order(s) with same Frequency and PRN reasons based on the score. If a patient is on this medication at home then do not decrease Frequency below that used at home.     0-3 - enter or revise RT bronchodilator order(s) to

## 2023-05-18 NOTE — CARE COORDINATION
Social Work--Pt was admitted from 4/18-4/21 . Pt can dc to [facility]. [facility] will not have availability tonight. [facility] is working on time for tomorrow.  Lluvia Dixon

## 2023-05-18 NOTE — CARE COORDINATION
LSW met with patient at bedside per his request. Patient would like to show his  some photos of the facility that he is going to. Discussed facility and how to get there. Reassured patient. Informed that plan is for him to go to [address] [place], [place] [address]y. [facility].

## 2023-05-18 NOTE — CARE COORDINATION
Social Work-Met with pt. Explained that [facility] will not have bed and [id number] [facility] will have bed. He is agreeable with transfer to [facility].  Arranged [facility] to transport at [address] [place] [address]

## 2023-05-18 NOTE — PLAN OF CARE
Problem: Respiratory - Adult  Goal: Achieves optimal ventilation and oxygenation  Outcome: Progressing       Problem: Respiratory - Adult  Goal: Clear lung sounds  Outcome: Progressing     Problem: Respiratory - Adult  Goal: Adequate oxygenation  Description: Adequate oxygenation  Outcome: Progressing

## 2023-05-18 NOTE — CARE COORDINATION
Met with patient at bedside. Patient apprehensive but agreeable to [facility]. Confirmed with [facility] St[facility] from admissions that they can do a private room and have a TV in room. Faxed referral to [phone].

## 2023-05-18 NOTE — PLAN OF CARE
Pt alert and oriented; lethargic and drifts off to sleep at times. SB on tele. Afebrile. SpO2 high 90s on 2L NC. No c/o pain. Bed bath administered upon arrival; many open areas on back/buttocks/folds. Powder/inner dry applied. Clemons in place. Pt receiving IVF and po abx. Working on SNF placement. PT/OT will see pt today. Bed in low position, call light within reach. Will continue to monitor. Problem: Discharge Planning  Goal: Discharge to home or other facility with appropriate resources  Outcome: Progressing     Problem: Pain  Goal: Verbalizes/displays adequate comfort level or baseline comfort level  Outcome: Progressing     Problem: Skin/Tissue Integrity  Goal: Absence of new skin breakdown  Description: 1. Monitor for areas of redness and/or skin breakdown  2. Assess vascular access sites hourly  3. Every 4-6 hours minimum:  Change oxygen saturation probe site  4. Every 4-6 hours:  If on nasal continuous positive airway pressure, respiratory therapy assess nares and determine need for appliance change or resting period.   Outcome: Progressing     Problem: Safety - Adult  Goal: Free from fall injury  Outcome: Progressing     Problem: ABCDS Injury Assessment  Goal: Absence of physical injury  Outcome: Progressing     Problem: Chronic Conditions and Co-morbidities  Goal: Patient's chronic conditions and co-morbidity symptoms are monitored and maintained or improved  Outcome: Progressing

## 2023-05-18 NOTE — CARE COORDINATION
Social Work-[facility] approed pt for admission and can admit today. They are requesting 7PM admission. Requested 630 transport.  Krishna Walters

## 2023-05-18 NOTE — CARE COORDINATION
Social Yayo Constantino can not admit today since pt was observation only during his last admission.  Thais Gaspar

## 2023-05-19 VITALS
WEIGHT: 315 LBS | OXYGEN SATURATION: 94 % | HEIGHT: 71 IN | RESPIRATION RATE: 16 BRPM | BODY MASS INDEX: 44.1 KG/M2 | SYSTOLIC BLOOD PRESSURE: 123 MMHG | DIASTOLIC BLOOD PRESSURE: 53 MMHG | TEMPERATURE: 97.3 F | HEART RATE: 62 BPM

## 2023-05-19 LAB — GLUCOSE BLD-MCNC: 106 MG/DL (ref 75–110)

## 2023-05-19 PROCEDURE — 99239 HOSP IP/OBS DSCHRG MGMT >30: CPT | Performed by: NURSE PRACTITIONER

## 2023-05-19 PROCEDURE — 6360000002 HC RX W HCPCS: Performed by: NURSE PRACTITIONER

## 2023-05-19 PROCEDURE — 6370000000 HC RX 637 (ALT 250 FOR IP): Performed by: NURSE PRACTITIONER

## 2023-05-19 PROCEDURE — 82947 ASSAY GLUCOSE BLOOD QUANT: CPT

## 2023-05-19 PROCEDURE — 2700000000 HC OXYGEN THERAPY PER DAY

## 2023-05-19 PROCEDURE — 97110 THERAPEUTIC EXERCISES: CPT

## 2023-05-19 PROCEDURE — 94640 AIRWAY INHALATION TREATMENT: CPT

## 2023-05-19 PROCEDURE — 94761 N-INVAS EAR/PLS OXIMETRY MLT: CPT

## 2023-05-19 RX ORDER — CLONAZEPAM 1 MG/1
1 TABLET ORAL 2 TIMES DAILY
Qty: 60 TABLET | Refills: 3 | Status: SHIPPED | OUTPATIENT
Start: 2023-05-19 | End: 2023-05-22

## 2023-05-19 RX ORDER — QUETIAPINE FUMARATE 50 MG/1
50 TABLET, EXTENDED RELEASE ORAL NIGHTLY
Qty: 30 TABLET | Refills: 0 | Status: SHIPPED | OUTPATIENT
Start: 2023-05-19

## 2023-05-19 RX ORDER — OXYCODONE AND ACETAMINOPHEN 7.5; 325 MG/1; MG/1
1 TABLET ORAL EVERY 6 HOURS PRN
Qty: 18 TABLET | Refills: 0 | Status: SHIPPED | OUTPATIENT
Start: 2023-05-19 | End: 2023-05-22

## 2023-05-19 RX ADMIN — PANTOPRAZOLE SODIUM 40 MG: 40 TABLET, DELAYED RELEASE ORAL at 05:38

## 2023-05-19 RX ADMIN — ALBUTEROL SULFATE 2 PUFF: 90 AEROSOL, METERED RESPIRATORY (INHALATION) at 03:45

## 2023-05-19 RX ADMIN — AMIODARONE HYDROCHLORIDE 200 MG: 200 TABLET ORAL at 09:34

## 2023-05-19 RX ADMIN — CLONAZEPAM 1 MG: 0.5 TABLET ORAL at 09:34

## 2023-05-19 RX ADMIN — ANTI-FUNGAL POWDER MICONAZOLE NITRATE TALC FREE: 1.42 POWDER TOPICAL at 09:15

## 2023-05-19 RX ADMIN — BUMETANIDE 2 MG: 1 TABLET ORAL at 09:34

## 2023-05-19 RX ADMIN — OXYCODONE 5 MG: 5 TABLET ORAL at 01:22

## 2023-05-19 RX ADMIN — TAMSULOSIN HYDROCHLORIDE 0.4 MG: 0.4 CAPSULE ORAL at 09:33

## 2023-05-19 RX ADMIN — CEPHALEXIN 500 MG: 500 CAPSULE ORAL at 05:38

## 2023-05-19 RX ADMIN — LEVOTHYROXINE SODIUM 175 MCG: 0.17 TABLET ORAL at 05:38

## 2023-05-19 RX ADMIN — TROSPIUM CHLORIDE 20 MG: 20 TABLET, FILM COATED ORAL at 05:38

## 2023-05-19 RX ADMIN — OXYCODONE 5 MG: 5 TABLET ORAL at 05:38

## 2023-05-19 RX ADMIN — METOPROLOL TARTRATE 50 MG: 25 TABLET, FILM COATED ORAL at 09:34

## 2023-05-19 ASSESSMENT — PAIN SCALES - GENERAL: PAINLEVEL_OUTOF10: 8

## 2023-05-19 NOTE — DISCHARGE SUMMARY
[facility]  Office: [address], DO, Montana Art, DO, Monica Snyder, DO, Vaishali Anderson, DO, Thao Skinner MD, Kolby Pennington MD, Titus Young MD, Blayne Gracia MD,  Bernardino Washington MD, May Bray MD, Rosas Clemons, DO, Faiza Aguirre MD,  Rolando Mendez MD, Manisha Aranda MD, Sheryl Zimmerman, DO, Ronnell Hearn MD, Jerica Chapa MD, Ashley Cerda DO, Iraida Graves MD, Sandra Junior MD, Lonny Mandujano MD, Carmelo Zamudio MD,  Terry Dixon DO, Jessica Wilson MD,  Irais Atsorga, CNP,  Misbah Camara, CNP, Alycia Hoff, CNP, Severino Mcfarlane, CNP,  Ying Eddy, [facility], Marco Antonio Barrios, CNP, Clemencia Mcdonald, CNP, Hector Amaral, CNP, Francis Leija, CNP, Valdemar Chua, [facility], Sean Dickson PADaC, Ibrahima Vyas, CNS, Berry Srinivasans, CNP, Torrie Osullivan, [facility]    Discharge Summary     Patient ID: Nelson Myers  :  1965   MRN: [id number]     ACCOUNT:  [de-identified]   Patient's PCP: Luz Elena Santamaria MD  Admit Date: 2023   Discharge Date: 2023     Length of Stay: 2  Code Status:  Full Code  Admitting Physician: Titus Young MD  Discharge Physician: SUNSHINE Olivas NP     Active Discharge Diagnoses:     Hospital Problem Lists:  Principal Problem:    Hyponatremia  Active Problems:    Bipolar disorder ([facility]    Dyslipidemia    Type 2 diabetes mellitus with diabetic neuropathy (HCC)    GERD (gastroesophageal reflux disease)    Hypertension    Venous stasis    Paroxysmal atrial fibrillation ([facility]  Resolved Problems:    * No resolved hospital problems.  *      Admission Condition:  poor     Discharged Condition: fair    Hospital Stay:     Hospital Course:  Nelson Myers is a 60 y.o. male who was admitted for the management of  Hyponatremia , presented to ER with Leg Swelling     - Patient is brought to the emergency department by EMS after a home health aide found him

## 2023-05-19 NOTE — PROGRESS NOTES
Incontinent care provided, IV and telemetry removed
[facility] attendants in facility, report and transport papers provided, patients transferred to stretcher with x6 assist, tolerated well, report called to Janneth Phelan LPN at [address]
Occupational Therapy  Facility/Department: [facility] PROGRESSIVE CARE  Occupational Therapy Initial Assessment    Name: Murphy Walker  : 1965  MRN: [id number]  Date of Service: 2023    Discharge Recommendations:  Patient would benefit from continued therapy after discharge. Pt currently functioning below baseline. Recommend daily inpatient skilled therapy at time of discharge to maximize long term outcomes and prevent re-admission. Please refer to AM-PAC score for current level of function. RN reports patient is medically stable for therapy treatment this date. Chart reviewed prior to treatment and patient is agreeable for therapy. All lines intact and patient positioned comfortably at end of treatment. All patient needs addressed prior to ending therapy session. Patient Diagnosis(es): The primary encounter diagnosis was Unable to care for self. Diagnoses of Cellulitis of back except buttock and Hyponatremia were also pertinent to this visit.   Past Medical History:  has a past medical history of A-fib ([address] Anxiety and depression, Back pain, chronic, BPH (benign prostatic hyperplasia), Cellulitis of left lower extremity, Cellulitis of right lower extremity, CHF (congestive heart failure) ([address] Chronic respiratory failure with hypoxia ([address] Cirrhosis ([address] Diabetes mellitus ([address] Epididymoorchitis, GERD (gastroesophageal reflux disease), H/O cardiac catheterization, Hepatitis, Hiatal hernia, History of alcohol abuse, Hyperlipidemia, Hypertension, IBS (irritable bowel syndrome), Incisional hernia with obstruction but no gangrene, Klebsiella sepsis ([address] Metabolic encephalopathy, Morbid obesity ([address] Neuropathy, On home oxygen therapy, On home oxygen therapy, Pancreatitis, Poisoning by insulin and oral hypoglycemic (antidiabetic) drugs, accidental (unintentional), initial encounter, Primary osteoarthritis of right knee, Retention, urine, SBO (small bowel obstruction) ([address] Secondary
Patient taken via stretcher with possessions, no complaints voiced at time of departure
Physical Therapy  Facility/Department: [facility] PROGRESSIVE CARE  Physical Therapy Initial Assessment    Name: Ruben Dimas  : 1965  MRN: [id number]  Date of Service: 2023    Discharge Recommendations:  Patient would benefit from continued therapy after discharge. Pt currently functioning below baseline.  Recommend daily inpatient skilled therapy at time of discharge to maximize long term outcomes and prevent re-admission. Please refer to AM-PAC score for current level of function.        HPI (per chart):  Patient is brought to the emergency department by EMS after a home health aide found him in an extremely poor state of living in his home.  Patient was seen and treated at this facility several weeks ago for GI bleed.  At that time it was recommended the patient go to skilled facility however he refused and was subsequently discharged home.  By his own report he was placed in a chair on his arrival at home and he has not moved since.  Patient has been soiling himself for nearly 2 weeks.  Emergency department evaluation finds patient to be extremely poor personal hygiene.  He has pressure ulcers that are closed to multiple areas on his back and coccyx.  Diffuse skin yeast contamination.  Patient is also found to be hyponatremic.  Patient Diagnosis(es): The primary encounter diagnosis was Unable to care for self. Diagnoses of Cellulitis of back except buttock and Hyponatremia were also pertinent to this visit.  Past Medical History:  has a past medical history of A-fib (HCC), Anxiety and depression, Back pain, chronic, BPH (benign prostatic hyperplasia), Cellulitis of left lower extremity, Cellulitis of right lower extremity, CHF (congestive heart failure) (HCC), Chronic respiratory failure with hypoxia (HCC), Cirrhosis (HCC), Diabetes mellitus (HCC), Epididymoorchitis, GERD (gastroesophageal reflux disease), H/O cardiac catheterization, Hepatitis, Hiatal hernia, History of alcohol abuse, Hyperlipidemia, 
Physical Therapy  Facility/Department: [facility] PROGRESSIVE CARE  Rehabilitation Physical Therapy Treatment Note    NAME: Cameron Atkinson  : 1965 (60 y.o.)  MRN: [id number]  CODE STATUS: Prior    Date of Service: 23       Restrictions:  Restrictions/Precautions: General Precautions, Contact Precautions  Position Activity Restriction  Other position/activity restrictions: IV, maximiliano lift     SUBJECTIVE  Subjective  Subjective: pt very sleepy agreeable to some bed ther ex            OBJECTIVE  Cognition  Arousal/Alertness: Delayed responses to stimuli;Inconsistent responses to stimuli  Following Commands: Inconsistently follows commands  Attention Span: Attends with cues to redirect  Memory: Decreased recall of biographical Information;Decreased recall of recent events;Decreased short term memory  Safety Judgement: Decreased awareness of need for assistance;Decreased awareness of need for safety  Problem Solving: Decreased awareness of errors;Assistance required to identify errors made;Assistance required to generate solutions;Assistance required to implement solutions;Assistance required to correct errors made  Insights: Decreased awareness of deficits  Initiation: Requires cues for all  Sequencing: Requires cues for all    Functional Mobility  Bed Mobility  Overall Assistance Level: Maximum Assistance; Requires x 2 Assistance                    PT Exercises  Exercise Treatment: AAROM BLE x 10 pt falling asleep throughout treatment session      ASSESSMENT/PROGRESS TOWARDS GOALS       Assessment  Activity Tolerance: Patient limited by fatigue, unable to stay awake during treatment session  Discharge Recommendations: Patient would benefit from continued therapy after discharge    Goals  Patient Goals   Patient Goals : Pain control  Short Term Goals  Time Frame for Short Term Goals: 12 visits  Short Term Goal 1: Patient will demo 3+/5 BLE strength.   Short Term Goal 2: Patient will tolerate 30 minutes of ther-ex and
Pt arrived from [facility] via stretcher. VS taken and tele applied. Bed bath administered. Orders released and admission requirements completed. Will monitor.
Pt had a restless night. Pt continued to report pain in legs and upper arms. PRN doses of Roxicodone were administered with patient reporting some relief. Clemons output was 550mL. Pt reported he felt SOB and anxiety, requesting medication. Respiratory evaluated patient and increased oxygen from 2L to 3L. Pt reported he felt better with intervention. Pts BS for evening 172. Brief change was performed with maximum assistance from patient.
[x] There are one or more home medications that need clarification before Medication Reconciliation can be completed. The Med List Status has been marked as In Progress. To assist with Home Medication Reconciliation the following actions have been taken:    [x] Pharmacy medication reconciliation service requested.  (Note: This can be done by sending a Perfect Serve message to [facility] Rec Pharmacist or by Kenny Izaguirre [phone].)
closed pressure ulcers to the back and coccyx are reported but not assessed by this provider at this time   Neurological:      General: No focal deficit present. Mental Status: He is alert and oriented to person, place, and time. Mental status is at baseline. Psychiatric:         Mood and Affect: Mood normal.         Behavior: Behavior normal.         Thought Content: Thought content normal.         Judgment: Judgment normal.     Assessment:        Hospital Problems             Last Modified POA    * (Principal) Hyponatremia 5/17/2023 Yes    Bipolar disorder ([facility] 5/17/2023 Yes    Dyslipidemia 5/17/2023 Yes    Type 2 diabetes mellitus with diabetic neuropathy (HCC) (Chronic) 5/17/2023 Yes    GERD (gastroesophageal reflux disease) 5/17/2023 Yes    Hypertension 5/17/2023 Yes    Venous stasis 5/17/2023 Yes    Paroxysmal atrial fibrillation ([facility] [facility] 5/17/2023 Yes       Plan:        Acute hyponatremia  Oral fluid restriction of 1500 mils per day  Gentle IV hydration at 75 mils per hour  Consider nephrology consult if sodium does not self-correct overnight  Bipolar disorder  We will continue home medication of Seroquel and trazodone for now  Morbid obesity with ambulatory dysfunction to care for himself in the home  Excellent nursing and aide care will be appreciated. PT/OT. Social work. Patient cannot return to his home in this condition. He is unable to care for himself. Patient will require rehab and SNF placement.   If patient refuses Adult Protective Services will need to be involved   It is internal medicine's opinion that returning home will lead to multiple medical complications and eventual death    SUNSHINE Azul NP  5/18/2023  12:32 PM
will require rehab and SNF placement.   If patient refuses Adult Protective Services will need to be involved   It is internal medicine's opinion that returning home will lead to multiple medical complications and eventual death    Yayo Uriostegui, APRN - NP  5/19/2023  9:12 AM

## 2023-05-19 NOTE — CARE COORDINATION
Social [facility] will admit today. [facility] will transport at 10 AM. Orders faxed. Nurse to call report to [phone]. Pt is agreeable with dc plans. He does not want  to notify his son.  Charisse Walker

## 2023-07-13 ENCOUNTER — HOSPITAL ENCOUNTER (INPATIENT)
Age: 58
LOS: 4 days | Discharge: NURSING, REHAB OR LONG-TERM CARE | DRG: 699 | End: 2023-07-17
Attending: EMERGENCY MEDICINE | Admitting: INTERNAL MEDICINE
Payer: MEDICARE

## 2023-07-13 DIAGNOSIS — T83.511A URINARY TRACT INFECTION ASSOCIATED WITH INDWELLING URETHRAL CATHETER, INITIAL ENCOUNTER (HCC): ICD-10-CM

## 2023-07-13 DIAGNOSIS — L03.116 BILATERAL LOWER LEG CELLULITIS: ICD-10-CM

## 2023-07-13 DIAGNOSIS — N39.0 URINARY TRACT INFECTION ASSOCIATED WITH INDWELLING URETHRAL CATHETER, INITIAL ENCOUNTER (HCC): ICD-10-CM

## 2023-07-13 DIAGNOSIS — F31.75 BIPOLAR DISORDER, IN PARTIAL REMISSION, MOST RECENT EPISODE DEPRESSED (HCC): ICD-10-CM

## 2023-07-13 DIAGNOSIS — G89.4 CHRONIC PAIN SYNDROME: ICD-10-CM

## 2023-07-13 DIAGNOSIS — L03.115 BILATERAL LOWER LEG CELLULITIS: ICD-10-CM

## 2023-07-13 DIAGNOSIS — R53.1 GENERAL WEAKNESS: Primary | ICD-10-CM

## 2023-07-13 PROBLEM — T07.XXXA WOUNDS, MULTIPLE: Status: ACTIVE | Noted: 2023-07-13

## 2023-07-13 LAB
AMORPH SED URNS QL MICRO: ABNORMAL
ANION GAP SERPL CALCULATED.3IONS-SCNC: 7 MMOL/L (ref 9–17)
BASOPHILS # BLD: 0 K/UL (ref 0–0.2)
BASOPHILS NFR BLD: 0 %
BILIRUB UR QL STRIP: NEGATIVE
BUN SERPL-MCNC: 24 MG/DL (ref 6–20)
BUN/CREAT SERPL: 20 (ref 9–20)
CALCIUM SERPL-MCNC: 9.3 MG/DL (ref 8.6–10.4)
CHLORIDE SERPL-SCNC: 77 MMOL/L (ref 98–107)
CLARITY UR: ABNORMAL
CO2 SERPL-SCNC: 33 MMOL/L (ref 20–31)
COLOR UR: YELLOW
CORTIS SERPL-MCNC: 19.7 UG/DL (ref 2.7–18.4)
CREAT SERPL-MCNC: 1.2 MG/DL (ref 0.7–1.2)
EOSINOPHIL # BLD: 0.16 K/UL (ref 0–0.4)
EOSINOPHILS RELATIVE PERCENT: 1 % (ref 1–4)
EPI CELLS #/AREA URNS HPF: ABNORMAL /HPF (ref 0–5)
ERYTHROCYTE [DISTWIDTH] IN BLOOD BY AUTOMATED COUNT: 14 % (ref 11.8–14.4)
GFR SERPL CREATININE-BSD FRML MDRD: >60 ML/MIN/1.73M2
GLUCOSE BLD-MCNC: 136 MG/DL (ref 75–110)
GLUCOSE BLD-MCNC: 151 MG/DL (ref 75–110)
GLUCOSE SERPL-MCNC: 221 MG/DL (ref 70–99)
GLUCOSE UR STRIP-MCNC: NEGATIVE MG/DL
HCT VFR BLD AUTO: 30.2 % (ref 40.7–50.3)
HGB BLD-MCNC: 9.5 G/DL (ref 13–17)
HGB UR QL STRIP.AUTO: ABNORMAL
IMM GRANULOCYTES # BLD AUTO: 0.16 K/UL (ref 0–0.3)
IMM GRANULOCYTES NFR BLD: 1 %
KETONES UR STRIP-MCNC: NEGATIVE MG/DL
LACTATE BLDV-SCNC: 1.4 MMOL/L (ref 0.5–2.2)
LEUKOCYTE ESTERASE UR QL STRIP: ABNORMAL
LYMPHOCYTES # BLD: 1 % (ref 24–44)
LYMPHOCYTES NFR BLD: 0.16 K/UL (ref 1–4.8)
MCH RBC QN AUTO: 26.8 PG (ref 25.2–33.5)
MCHC RBC AUTO-ENTMCNC: 31.5 G/DL (ref 28.4–34.8)
MCV RBC AUTO: 85.1 FL (ref 82.6–102.9)
MONOCYTES NFR BLD: 0.79 K/UL (ref 0.2–0.8)
MONOCYTES NFR BLD: 5 % (ref 1–7)
NEUTROPHILS NFR BLD: 92 % (ref 36–66)
NEUTS SEG NFR BLD: 14.53 K/UL (ref 1.8–7.7)
NITRITE UR QL STRIP: NEGATIVE
NRBC BLD-RTO: 0 PER 100 WBC
OSMOLALITY SERPL: 269 MOSM/KG (ref 275–295)
PH UR STRIP: 6.5 [PH] (ref 5–8)
PLATELET # BLD AUTO: 123 K/UL (ref 138–453)
PMV BLD AUTO: 10 FL (ref 8.1–13.5)
POTASSIUM SERPL-SCNC: 5.3 MMOL/L (ref 3.7–5.3)
PROT UR STRIP-MCNC: ABNORMAL MG/DL
RBC # BLD AUTO: 3.55 M/UL (ref 4.21–5.77)
RBC #/AREA URNS HPF: ABNORMAL /HPF (ref 0–2)
SODIUM SERPL-SCNC: 117 MMOL/L (ref 135–144)
SODIUM SERPL-SCNC: 117 MMOL/L (ref 135–144)
SODIUM SERPL-SCNC: 118 MMOL/L (ref 135–144)
SODIUM UR-SCNC: 20 MMOL/L
SP GR UR STRIP: 1.02 (ref 1–1.03)
UROBILINOGEN UR STRIP-ACNC: NORMAL EU/DL (ref 0–1)
WBC #/AREA URNS HPF: ABNORMAL /HPF (ref 0–5)
WBC OTHER # BLD: 15.8 K/UL (ref 3.5–11.3)

## 2023-07-13 PROCEDURE — 84300 ASSAY OF URINE SODIUM: CPT

## 2023-07-13 PROCEDURE — 96367 TX/PROPH/DG ADDL SEQ IV INF: CPT

## 2023-07-13 PROCEDURE — 80048 BASIC METABOLIC PNL TOTAL CA: CPT

## 2023-07-13 PROCEDURE — 87077 CULTURE AEROBIC IDENTIFY: CPT

## 2023-07-13 PROCEDURE — 99285 EMERGENCY DEPT VISIT HI MDM: CPT

## 2023-07-13 PROCEDURE — 84588 ASSAY OF VASOPRESSIN: CPT

## 2023-07-13 PROCEDURE — 83935 ASSAY OF URINE OSMOLALITY: CPT

## 2023-07-13 PROCEDURE — 51702 INSERT TEMP BLADDER CATH: CPT

## 2023-07-13 PROCEDURE — 82530 CORTISOL FREE: CPT

## 2023-07-13 PROCEDURE — 83605 ASSAY OF LACTIC ACID: CPT

## 2023-07-13 PROCEDURE — 99223 1ST HOSP IP/OBS HIGH 75: CPT | Performed by: INTERNAL MEDICINE

## 2023-07-13 PROCEDURE — 87186 SC STD MICRODIL/AGAR DIL: CPT

## 2023-07-13 PROCEDURE — 96375 TX/PRO/DX INJ NEW DRUG ADDON: CPT

## 2023-07-13 PROCEDURE — 96376 TX/PRO/DX INJ SAME DRUG ADON: CPT

## 2023-07-13 PROCEDURE — 2060000000 HC ICU INTERMEDIATE R&B

## 2023-07-13 PROCEDURE — 82947 ASSAY GLUCOSE BLOOD QUANT: CPT

## 2023-07-13 PROCEDURE — 84295 ASSAY OF SERUM SODIUM: CPT

## 2023-07-13 PROCEDURE — 82533 TOTAL CORTISOL: CPT

## 2023-07-13 PROCEDURE — 87040 BLOOD CULTURE FOR BACTERIA: CPT

## 2023-07-13 PROCEDURE — 87086 URINE CULTURE/COLONY COUNT: CPT

## 2023-07-13 PROCEDURE — 81001 URINALYSIS AUTO W/SCOPE: CPT

## 2023-07-13 PROCEDURE — 36415 COLL VENOUS BLD VENIPUNCTURE: CPT

## 2023-07-13 PROCEDURE — 6360000002 HC RX W HCPCS: Performed by: INTERNAL MEDICINE

## 2023-07-13 PROCEDURE — 2580000003 HC RX 258: Performed by: EMERGENCY MEDICINE

## 2023-07-13 PROCEDURE — 6370000000 HC RX 637 (ALT 250 FOR IP): Performed by: INTERNAL MEDICINE

## 2023-07-13 PROCEDURE — 6360000002 HC RX W HCPCS: Performed by: EMERGENCY MEDICINE

## 2023-07-13 PROCEDURE — 96365 THER/PROPH/DIAG IV INF INIT: CPT

## 2023-07-13 PROCEDURE — 6370000000 HC RX 637 (ALT 250 FOR IP): Performed by: NURSE PRACTITIONER

## 2023-07-13 PROCEDURE — 83930 ASSAY OF BLOOD OSMOLALITY: CPT

## 2023-07-13 PROCEDURE — 87205 SMEAR GRAM STAIN: CPT

## 2023-07-13 PROCEDURE — 85027 COMPLETE CBC AUTOMATED: CPT

## 2023-07-13 PROCEDURE — 87154 CUL TYP ID BLD PTHGN 6+ TRGT: CPT

## 2023-07-13 RX ORDER — HYDROXYZINE HYDROCHLORIDE 25 MG/1
25 TABLET, FILM COATED ORAL 2 TIMES DAILY
Status: DISCONTINUED | OUTPATIENT
Start: 2023-07-13 | End: 2023-07-17 | Stop reason: HOSPADM

## 2023-07-13 RX ORDER — TROSPIUM CHLORIDE 20 MG/1
20 TABLET, FILM COATED ORAL
Status: DISCONTINUED | OUTPATIENT
Start: 2023-07-13 | End: 2023-07-17 | Stop reason: HOSPADM

## 2023-07-13 RX ORDER — SODIUM CHLORIDE 9 MG/ML
INJECTION, SOLUTION INTRAVENOUS CONTINUOUS
Status: DISCONTINUED | OUTPATIENT
Start: 2023-07-13 | End: 2023-07-14

## 2023-07-13 RX ORDER — SODIUM CHLORIDE 0.9 % (FLUSH) 0.9 %
10 SYRINGE (ML) INJECTION PRN
Status: DISCONTINUED | OUTPATIENT
Start: 2023-07-13 | End: 2023-07-17 | Stop reason: HOSPADM

## 2023-07-13 RX ORDER — HYDROXYZINE HYDROCHLORIDE 25 MG/1
25 TABLET, FILM COATED ORAL 2 TIMES DAILY
COMMUNITY

## 2023-07-13 RX ORDER — OXYCODONE AND ACETAMINOPHEN 7.5; 325 MG/1; MG/1
1 TABLET ORAL EVERY 6 HOURS PRN
Status: ON HOLD | COMMUNITY
End: 2023-07-17 | Stop reason: SDUPTHER

## 2023-07-13 RX ORDER — MIDODRINE HYDROCHLORIDE 10 MG/1
10 TABLET ORAL
Status: DISCONTINUED | OUTPATIENT
Start: 2023-07-13 | End: 2023-07-17 | Stop reason: HOSPADM

## 2023-07-13 RX ORDER — SODIUM CHLORIDE 0.9 % (FLUSH) 0.9 %
5-40 SYRINGE (ML) INJECTION EVERY 12 HOURS SCHEDULED
Status: DISCONTINUED | OUTPATIENT
Start: 2023-07-13 | End: 2023-07-17 | Stop reason: HOSPADM

## 2023-07-13 RX ORDER — CLOBETASOL PROPIONATE 0.5 MG/G
OINTMENT TOPICAL 2 TIMES DAILY
Status: DISCONTINUED | OUTPATIENT
Start: 2023-07-13 | End: 2023-07-17 | Stop reason: HOSPADM

## 2023-07-13 RX ORDER — DOCUSATE SODIUM 100 MG/1
100 CAPSULE, LIQUID FILLED ORAL DAILY
Status: DISCONTINUED | OUTPATIENT
Start: 2023-07-13 | End: 2023-07-17 | Stop reason: HOSPADM

## 2023-07-13 RX ORDER — INSULIN GLARGINE 100 [IU]/ML
75 INJECTION, SOLUTION SUBCUTANEOUS 2 TIMES DAILY
Status: DISCONTINUED | OUTPATIENT
Start: 2023-07-13 | End: 2023-07-17 | Stop reason: HOSPADM

## 2023-07-13 RX ORDER — LEVOTHYROXINE SODIUM 0.2 MG/1
200 TABLET ORAL DAILY
Status: DISCONTINUED | OUTPATIENT
Start: 2023-07-13 | End: 2023-07-17 | Stop reason: HOSPADM

## 2023-07-13 RX ORDER — ONDANSETRON 4 MG/1
4 TABLET, ORALLY DISINTEGRATING ORAL EVERY 8 HOURS PRN
Status: DISCONTINUED | OUTPATIENT
Start: 2023-07-13 | End: 2023-07-17 | Stop reason: HOSPADM

## 2023-07-13 RX ORDER — POTASSIUM CHLORIDE 7.45 MG/ML
10 INJECTION INTRAVENOUS PRN
Status: DISCONTINUED | OUTPATIENT
Start: 2023-07-13 | End: 2023-07-17 | Stop reason: HOSPADM

## 2023-07-13 RX ORDER — INSULIN LISPRO 100 [IU]/ML
0-4 INJECTION, SOLUTION INTRAVENOUS; SUBCUTANEOUS NIGHTLY
Status: DISCONTINUED | OUTPATIENT
Start: 2023-07-13 | End: 2023-07-17 | Stop reason: HOSPADM

## 2023-07-13 RX ORDER — METOPROLOL TARTRATE 50 MG/1
50 TABLET, FILM COATED ORAL 2 TIMES DAILY
Status: DISCONTINUED | OUTPATIENT
Start: 2023-07-13 | End: 2023-07-17 | Stop reason: HOSPADM

## 2023-07-13 RX ORDER — OXYCODONE HYDROCHLORIDE AND ACETAMINOPHEN 5; 325 MG/1; MG/1
1 TABLET ORAL ONCE
Status: COMPLETED | OUTPATIENT
Start: 2023-07-13 | End: 2023-07-13

## 2023-07-13 RX ORDER — PHENAZOPYRIDINE HYDROCHLORIDE 200 MG/1
200 TABLET, FILM COATED ORAL 3 TIMES DAILY
Status: ON HOLD | COMMUNITY
End: 2023-07-17 | Stop reason: HOSPADM

## 2023-07-13 RX ORDER — DEXTROSE MONOHYDRATE 100 MG/ML
INJECTION, SOLUTION INTRAVENOUS CONTINUOUS PRN
Status: DISCONTINUED | OUTPATIENT
Start: 2023-07-13 | End: 2023-07-17 | Stop reason: HOSPADM

## 2023-07-13 RX ORDER — OXYBUTYNIN CHLORIDE 10 MG/1
10 TABLET, EXTENDED RELEASE ORAL DAILY
Status: ON HOLD | COMMUNITY
End: 2023-07-14

## 2023-07-13 RX ORDER — MAGNESIUM SULFATE 1 G/100ML
1000 INJECTION INTRAVENOUS PRN
Status: DISCONTINUED | OUTPATIENT
Start: 2023-07-13 | End: 2023-07-17 | Stop reason: HOSPADM

## 2023-07-13 RX ORDER — QUETIAPINE FUMARATE 50 MG/1
50 TABLET, EXTENDED RELEASE ORAL NIGHTLY
Status: DISCONTINUED | OUTPATIENT
Start: 2023-07-13 | End: 2023-07-17 | Stop reason: HOSPADM

## 2023-07-13 RX ORDER — ENOXAPARIN SODIUM 100 MG/ML
40 INJECTION SUBCUTANEOUS 2 TIMES DAILY
Status: DISCONTINUED | OUTPATIENT
Start: 2023-07-13 | End: 2023-07-17 | Stop reason: HOSPADM

## 2023-07-13 RX ORDER — ACETAMINOPHEN 650 MG/1
650 SUPPOSITORY RECTAL EVERY 6 HOURS PRN
Status: DISCONTINUED | OUTPATIENT
Start: 2023-07-13 | End: 2023-07-17 | Stop reason: HOSPADM

## 2023-07-13 RX ORDER — PANTOPRAZOLE SODIUM 40 MG/1
40 TABLET, DELAYED RELEASE ORAL 2 TIMES DAILY
Status: DISCONTINUED | OUTPATIENT
Start: 2023-07-13 | End: 2023-07-17 | Stop reason: HOSPADM

## 2023-07-13 RX ORDER — ACETAMINOPHEN 325 MG/1
650 TABLET ORAL EVERY 6 HOURS PRN
Status: DISCONTINUED | OUTPATIENT
Start: 2023-07-13 | End: 2023-07-17 | Stop reason: HOSPADM

## 2023-07-13 RX ORDER — LANOLIN ALCOHOL/MO/W.PET/CERES
325 CREAM (GRAM) TOPICAL
Status: DISCONTINUED | OUTPATIENT
Start: 2023-07-13 | End: 2023-07-17 | Stop reason: HOSPADM

## 2023-07-13 RX ORDER — INSULIN LISPRO 100 [IU]/ML
0-16 INJECTION, SOLUTION INTRAVENOUS; SUBCUTANEOUS
Status: DISCONTINUED | OUTPATIENT
Start: 2023-07-13 | End: 2023-07-17 | Stop reason: HOSPADM

## 2023-07-13 RX ORDER — ALBUTEROL SULFATE 2.5 MG/3ML
2.5 SOLUTION RESPIRATORY (INHALATION) EVERY 4 HOURS PRN
Status: ON HOLD | COMMUNITY
End: 2023-07-14

## 2023-07-13 RX ORDER — MORPHINE SULFATE 2 MG/ML
2 INJECTION, SOLUTION INTRAMUSCULAR; INTRAVENOUS ONCE
Status: COMPLETED | OUTPATIENT
Start: 2023-07-13 | End: 2023-07-13

## 2023-07-13 RX ORDER — POTASSIUM CHLORIDE 20 MEQ/1
40 TABLET, EXTENDED RELEASE ORAL PRN
Status: DISCONTINUED | OUTPATIENT
Start: 2023-07-13 | End: 2023-07-17 | Stop reason: HOSPADM

## 2023-07-13 RX ORDER — NYSTATIN 100000 U/G
CREAM TOPICAL 2 TIMES DAILY PRN
Status: DISCONTINUED | OUTPATIENT
Start: 2023-07-13 | End: 2023-07-17 | Stop reason: HOSPADM

## 2023-07-13 RX ORDER — ONDANSETRON 2 MG/ML
4 INJECTION INTRAMUSCULAR; INTRAVENOUS EVERY 6 HOURS PRN
Status: DISCONTINUED | OUTPATIENT
Start: 2023-07-13 | End: 2023-07-17 | Stop reason: HOSPADM

## 2023-07-13 RX ORDER — POLYETHYLENE GLYCOL 3350 17 G/17G
17 POWDER, FOR SOLUTION ORAL DAILY PRN
Status: DISCONTINUED | OUTPATIENT
Start: 2023-07-13 | End: 2023-07-17 | Stop reason: HOSPADM

## 2023-07-13 RX ORDER — ONDANSETRON 2 MG/ML
4 INJECTION INTRAMUSCULAR; INTRAVENOUS ONCE
Status: COMPLETED | OUTPATIENT
Start: 2023-07-13 | End: 2023-07-13

## 2023-07-13 RX ORDER — TAMSULOSIN HYDROCHLORIDE 0.4 MG/1
0.4 CAPSULE ORAL DAILY
Status: DISCONTINUED | OUTPATIENT
Start: 2023-07-13 | End: 2023-07-17 | Stop reason: HOSPADM

## 2023-07-13 RX ORDER — POTASSIUM CHLORIDE 20 MEQ/1
20 TABLET, EXTENDED RELEASE ORAL DAILY
Status: DISCONTINUED | OUTPATIENT
Start: 2023-07-14 | End: 2023-07-14

## 2023-07-13 RX ORDER — SODIUM CHLORIDE 9 MG/ML
INJECTION, SOLUTION INTRAVENOUS PRN
Status: DISCONTINUED | OUTPATIENT
Start: 2023-07-13 | End: 2023-07-17 | Stop reason: HOSPADM

## 2023-07-13 RX ADMIN — ONDANSETRON 4 MG: 2 INJECTION INTRAMUSCULAR; INTRAVENOUS at 10:44

## 2023-07-13 RX ADMIN — VANCOMYCIN HYDROCHLORIDE 2500 MG: 1 INJECTION, POWDER, LYOPHILIZED, FOR SOLUTION INTRAVENOUS at 13:54

## 2023-07-13 RX ADMIN — SODIUM CHLORIDE: 9 INJECTION, SOLUTION INTRAVENOUS at 14:10

## 2023-07-13 RX ADMIN — ENOXAPARIN SODIUM 40 MG: 100 INJECTION SUBCUTANEOUS at 20:51

## 2023-07-13 RX ADMIN — HYDROXYZINE HYDROCHLORIDE 25 MG: 25 TABLET, FILM COATED ORAL at 23:13

## 2023-07-13 RX ADMIN — MORPHINE SULFATE 2 MG: 2 INJECTION, SOLUTION INTRAMUSCULAR; INTRAVENOUS at 14:08

## 2023-07-13 RX ADMIN — OXYCODONE AND ACETAMINOPHEN 1 TABLET: 5; 325 TABLET ORAL at 23:11

## 2023-07-13 RX ADMIN — CEFTRIAXONE SODIUM 1000 MG: 1 INJECTION, POWDER, FOR SOLUTION INTRAMUSCULAR; INTRAVENOUS at 13:10

## 2023-07-13 RX ADMIN — QUETIAPINE FUMARATE 50 MG: 50 TABLET, EXTENDED RELEASE ORAL at 20:52

## 2023-07-13 RX ADMIN — TAMSULOSIN HYDROCHLORIDE 0.4 MG: 0.4 CAPSULE ORAL at 20:52

## 2023-07-13 RX ADMIN — CLOBETASOL PROPIONATE: 0.5 OINTMENT TOPICAL at 20:52

## 2023-07-13 RX ADMIN — METOPROLOL TARTRATE 50 MG: 50 TABLET, FILM COATED ORAL at 20:52

## 2023-07-13 RX ADMIN — MORPHINE SULFATE 2 MG: 2 INJECTION, SOLUTION INTRAMUSCULAR; INTRAVENOUS at 10:44

## 2023-07-13 ASSESSMENT — ENCOUNTER SYMPTOMS
SHORTNESS OF BREATH: 0
EYE PAIN: 0
ABDOMINAL PAIN: 0
CHEST TIGHTNESS: 0
ABDOMINAL DISTENTION: 0
EYE DISCHARGE: 0
BACK PAIN: 0
FACIAL SWELLING: 0

## 2023-07-13 ASSESSMENT — PAIN DESCRIPTION - LOCATION: LOCATION: BUTTOCKS

## 2023-07-13 ASSESSMENT — PAIN - FUNCTIONAL ASSESSMENT
PAIN_FUNCTIONAL_ASSESSMENT: 0-10
PAIN_FUNCTIONAL_ASSESSMENT: PREVENTS OR INTERFERES SOME ACTIVE ACTIVITIES AND ADLS

## 2023-07-13 ASSESSMENT — PAIN SCALES - GENERAL
PAINLEVEL_OUTOF10: 9
PAINLEVEL_OUTOF10: 0
PAINLEVEL_OUTOF10: 9

## 2023-07-13 ASSESSMENT — PAIN DESCRIPTION - DESCRIPTORS: DESCRIPTORS: ACHING

## 2023-07-13 NOTE — CARE COORDINATION
Case Management Assessment  Initial Evaluation    Date/Time of Evaluation: 7/13/2023 12:49 PM  Assessment Completed by: LEILA Verma    If patient is discharged prior to next notation, then this note serves as note for discharge by case management. Patient Name: Melony Ceja                   YOB: 1965  Diagnosis: No admission diagnoses are documented for this encounter. Date / Time: 7/13/2023 10:02 AM    Patient Admission Status: Emergency   Readmission Risk (Low < 19, Mod (19-27), High > 27): Readmission Risk Score: 25.3    Current PCP: Shaheen Zambrano MD  PCP verified by CM? Yes    Chart Reviewed: Yes      History Provided by: Patient  Patient Orientation: Alert and Oriented    Patient Cognition: Alert    Hospitalization in the last 30 days (Readmission):  No    If yes, Readmission Assessment in CM Navigator will be completed. Advance Directives:      Code Status: Prior   Patient's Primary Decision Maker is: Legal Next of Kin    Primary Decision MakeRalflinda Barron Child - [phone]    Primary Decision Maker: Angelicaalfa Medhat  Child - [phone]    Discharge Planning:    Patient lives with: Alone Type of Home: House  Primary Care Giver:  Other (Comment) (aides)  Patient Support Systems include: Children, Spiritism/[facility]   Current Financial resources: [employer], Medicare  Current community resources: None  Current services prior to admission: [address] [place] [address],[address], Home Care ([facility])            Current DME: Wheelchair, Oxygen Therapy (Comment)            Type of Home Care services:  Skilled Therapy, Nursing Services    ADLS  Prior functional level: Assistance with the following:, Bathing, Dressing, Toileting, Cooking, Housework, Shopping, Mobility  Current functional level: Assistance with the following:, Bathing, Dressing, Toileting, Cooking, Housework, Shopping, Mobility    PT AM-PAC:   /24  OT AM-PAC:   /24    Family can provide

## 2023-07-13 NOTE — PROGRESS NOTES
Patient received from ER Vitals taken. Assessment completed. No distress noted. See doc flowsheet and transfer navigator for details. POC and education reviewed with patient. Call light within reach, and pt educated on its use. Bed in lowest position, and locked. Side rails up x 2. Denied further questions or needs at this time. Nephrology notified of sodium. Will continue to monitor.

## 2023-07-13 NOTE — ED NOTES
RN at bedside, pt updated on plan of care. No distress noted at this time. Pt denies needing anything.  RN will continue to monitor      Jacquie Alvarenga RN  07/13/23 2506

## 2023-07-13 NOTE — H&P
effort  Cardiovascular: normal rate, regular rhythm, no murmur, gallop, rub.   Abdomen: Soft, nontender, nondistended, normal bowel sounds, no hepatomegaly or splenomegaly; pes  Neurologic: There are no new focal motor or sensory deficits, normal muscle tone and bulk, no abnormal sensation, normal speech, cranial nerves II through XII grossly intact  Skin: Has chronic venous stasis changes and multiple wounds and rashes on legs and back and buttocks and sacral regions; see pictures below  Extremities:  peripheral pulses palpable, no calf pain with palpation; bilateral chronic lymphedema  Psych: normal affect                   Investigations:      Laboratory Testing:  Recent Results (from the past 24 hour(s))   BMP    Collection Time: 07/13/23 10:35 AM   Result Value Ref Range    Glucose 221 (H) 70 - 99 mg/dL    BUN 24 (H) 6 - 20 mg/dL    Creatinine 1.2 0.7 - 1.2 mg/dL    Est, Glom Filt Rate >60 >60 mL/min/1.73m2    Bun/Cre Ratio 20 9 - 20    Calcium 9.3 8.6 - 10.4 mg/dL    Sodium 117 (LL) 135 - 144 mmol/L    Potassium 5.3 3.7 - 5.3 mmol/L    Chloride 77 (L) 98 - 107 mmol/L    CO2 33 (H) 20 - 31 mmol/L    Anion Gap 7 (L) 9 - 17 mmol/L   CBC with Auto Differential    Collection Time: 07/13/23 10:35 AM   Result Value Ref Range    WBC 15.8 (H) 3.5 - 11.3 k/uL    RBC 3.55 (L) 4.21 - 5.77 m/uL    Hemoglobin 9.5 (L) 13.0 - 17.0 g/dL    Hematocrit 30.2 (L) 40.7 - 50.3 %    MCV 85.1 82.6 - 102.9 fL    MCH 26.8 25.2 - 33.5 pg    MCHC 31.5 28.4 - 34.8 g/dL    RDW 14.0 11.8 - 14.4 %    Platelets 517 (L) 096 - 453 k/uL    MPV 10.0 8.1 - 13.5 fL    NRBC Automated 0.0 0.0 per 100 WBC    Seg Neutrophils 92 (H) 36 - 66 %    Lymphocytes 1 (L) 24 - 44 %    Monocytes 5 1 - 7 %    Eosinophils % 1 1 - 4 %    Basophils 0 %    Immature Granulocytes 1 (H) 0 %    Segs Absolute 14.53 (H) 1.8 - 7.7 k/uL    Absolute Lymph # 0.16 (L) 1.0 - 4.8 k/uL    Absolute Mono # 0.79 0.2 - 0.8 k/uL    Absolute Eos # 0.16 0.0 - 0.4 k/uL    Basophils Absolute

## 2023-07-13 NOTE — ED NOTES
Pt arrived via EMS from home. Pt states he was at [facility] rehab x1 week for weakness about a week ago. Pt states he has a home health nurse who is with him 8hrs of the day and lives at home with his Son. Pt incont of stool upon arrival. Pt buttox, abd fold, inner groin appears excoriated and bleeding. Pt states this area is painful.      Phillip Snow RN  07/13/23 5624

## 2023-07-13 NOTE — ED PROVIDER NOTES
TO DOSE VANCOMYCIN  IP CONSULT TO NEPHROLOGY  FINAL IMPRESSION      1. General weakness    2. Urinary tract infection associated with indwelling urethral catheter, initial encounter ([address])    3. Bilateral lower leg cellulitis          DISPOSITION/PLAN   DISPOSITION Admitted 07/13/2023 02:11:23 PM      OUTPATIENT FOLLOW UP THE PATIENT:  No follow-up provider specified.     MD Hedy Arora MD  [phone]/[id number]/[id number]

## 2023-07-14 PROBLEM — A41.59 SEPSIS DUE TO PROTEUS SPECIES (HCC): Status: ACTIVE | Noted: 2023-07-14

## 2023-07-14 LAB
ALBUMIN SERPL-MCNC: 2.6 G/DL (ref 3.5–5.2)
ALP SERPL-CCNC: 314 U/L (ref 40–129)
ALT SERPL-CCNC: 28 U/L (ref 5–41)
ANION GAP SERPL CALCULATED.3IONS-SCNC: 6 MMOL/L (ref 9–17)
ANION GAP SERPL CALCULATED.3IONS-SCNC: 7 MMOL/L (ref 9–17)
AST SERPL-CCNC: 17 U/L
BASOPHILS # BLD: 0 K/UL (ref 0–0.2)
BASOPHILS NFR BLD: 0 %
BILIRUB SERPL-MCNC: 1.4 MG/DL (ref 0.3–1.2)
BUN SERPL-MCNC: 21 MG/DL (ref 6–20)
BUN SERPL-MCNC: 22 MG/DL (ref 6–20)
BUN/CREAT SERPL: 20 (ref 9–20)
BUN/CREAT SERPL: 21 (ref 9–20)
CALCIUM SERPL-MCNC: 10.3 MG/DL (ref 8.6–10.4)
CALCIUM SERPL-MCNC: 9.7 MG/DL (ref 8.6–10.4)
CHLORIDE SERPL-SCNC: 82 MMOL/L (ref 98–107)
CHLORIDE SERPL-SCNC: 83 MMOL/L (ref 98–107)
CO2 SERPL-SCNC: 33 MMOL/L (ref 20–31)
CO2 SERPL-SCNC: 34 MMOL/L (ref 20–31)
CREAT SERPL-MCNC: 1 MG/DL (ref 0.7–1.2)
CREAT SERPL-MCNC: 1.1 MG/DL (ref 0.7–1.2)
EOSINOPHIL # BLD: 0 K/UL (ref 0–0.4)
EOSINOPHILS RELATIVE PERCENT: 0 % (ref 1–4)
ERYTHROCYTE [DISTWIDTH] IN BLOOD BY AUTOMATED COUNT: 14.3 % (ref 11.8–14.4)
GFR SERPL CREATININE-BSD FRML MDRD: >60 ML/MIN/1.73M2
GFR SERPL CREATININE-BSD FRML MDRD: >60 ML/MIN/1.73M2
GLUCOSE BLD-MCNC: 141 MG/DL (ref 75–110)
GLUCOSE BLD-MCNC: 150 MG/DL (ref 75–110)
GLUCOSE BLD-MCNC: 171 MG/DL (ref 75–110)
GLUCOSE BLD-MCNC: 174 MG/DL (ref 75–110)
GLUCOSE SERPL-MCNC: 137 MG/DL (ref 70–99)
GLUCOSE SERPL-MCNC: 157 MG/DL (ref 70–99)
HCT VFR BLD AUTO: 29.2 % (ref 40.7–50.3)
HGB BLD-MCNC: 9.3 G/DL (ref 13–17)
IMM GRANULOCYTES # BLD AUTO: 0.13 K/UL (ref 0–0.3)
IMM GRANULOCYTES NFR BLD: 1 %
LYMPHOCYTES # BLD: 2 % (ref 24–44)
LYMPHOCYTES NFR BLD: 0.25 K/UL (ref 1–4.8)
MAGNESIUM SERPL-MCNC: 2 MG/DL (ref 1.6–2.6)
MCH RBC QN AUTO: 27 PG (ref 25.2–33.5)
MCHC RBC AUTO-ENTMCNC: 31.8 G/DL (ref 28.4–34.8)
MCV RBC AUTO: 84.9 FL (ref 82.6–102.9)
MICROORGANISM SPEC CULT: ABNORMAL
MICROORGANISM SPEC CULT: NORMAL
MONOCYTES NFR BLD: 0.76 K/UL (ref 0.2–0.8)
MONOCYTES NFR BLD: 6 % (ref 1–7)
NEUTROPHILS NFR BLD: 91 % (ref 36–66)
NEUTS SEG NFR BLD: 11.56 K/UL (ref 1.8–7.7)
NRBC BLD-RTO: 0 PER 100 WBC
OSMOLALITY UR: 233 MOSM/KG (ref 80–1300)
PLATELET # BLD AUTO: 111 K/UL (ref 138–453)
PMV BLD AUTO: 9.8 FL (ref 8.1–13.5)
POTASSIUM SERPL-SCNC: 5 MMOL/L (ref 3.7–5.3)
POTASSIUM SERPL-SCNC: 5.4 MMOL/L (ref 3.7–5.3)
PROT SERPL-MCNC: 6.3 G/DL (ref 6.4–8.3)
RBC # BLD AUTO: 3.44 M/UL (ref 4.21–5.77)
SERVICE CMNT-IMP: ABNORMAL
SERVICE CMNT-IMP: ABNORMAL
SODIUM SERPL-SCNC: 122 MMOL/L (ref 135–144)
SODIUM SERPL-SCNC: 123 MMOL/L (ref 135–144)
SODIUM SERPL-SCNC: 125 MMOL/L (ref 135–144)
SPECIMEN DESCRIPTION: ABNORMAL
SPECIMEN DESCRIPTION: ABNORMAL
SPECIMEN DESCRIPTION: NORMAL
WBC OTHER # BLD: 12.7 K/UL (ref 3.5–11.3)

## 2023-07-14 PROCEDURE — 6370000000 HC RX 637 (ALT 250 FOR IP): Performed by: NURSE PRACTITIONER

## 2023-07-14 PROCEDURE — 97535 SELF CARE MNGMENT TRAINING: CPT

## 2023-07-14 PROCEDURE — 83735 ASSAY OF MAGNESIUM: CPT

## 2023-07-14 PROCEDURE — 82947 ASSAY GLUCOSE BLOOD QUANT: CPT

## 2023-07-14 PROCEDURE — 2580000003 HC RX 258: Performed by: INTERNAL MEDICINE

## 2023-07-14 PROCEDURE — 80053 COMPREHEN METABOLIC PANEL: CPT

## 2023-07-14 PROCEDURE — 2060000000 HC ICU INTERMEDIATE R&B

## 2023-07-14 PROCEDURE — 85027 COMPLETE CBC AUTOMATED: CPT

## 2023-07-14 PROCEDURE — 6370000000 HC RX 637 (ALT 250 FOR IP): Performed by: INTERNAL MEDICINE

## 2023-07-14 PROCEDURE — 2700000000 HC OXYGEN THERAPY PER DAY

## 2023-07-14 PROCEDURE — 36415 COLL VENOUS BLD VENIPUNCTURE: CPT

## 2023-07-14 PROCEDURE — 97163 PT EVAL HIGH COMPLEX 45 MIN: CPT

## 2023-07-14 PROCEDURE — 94761 N-INVAS EAR/PLS OXIMETRY MLT: CPT

## 2023-07-14 PROCEDURE — 99232 SBSQ HOSP IP/OBS MODERATE 35: CPT | Performed by: INTERNAL MEDICINE

## 2023-07-14 PROCEDURE — 84295 ASSAY OF SERUM SODIUM: CPT

## 2023-07-14 PROCEDURE — 80048 BASIC METABOLIC PNL TOTAL CA: CPT

## 2023-07-14 PROCEDURE — 97167 OT EVAL HIGH COMPLEX 60 MIN: CPT

## 2023-07-14 PROCEDURE — 99222 1ST HOSP IP/OBS MODERATE 55: CPT

## 2023-07-14 PROCEDURE — 97530 THERAPEUTIC ACTIVITIES: CPT

## 2023-07-14 PROCEDURE — 6360000002 HC RX W HCPCS: Performed by: INTERNAL MEDICINE

## 2023-07-14 RX ORDER — OXYCODONE HYDROCHLORIDE AND ACETAMINOPHEN 5; 325 MG/1; MG/1
1.5 TABLET ORAL EVERY 6 HOURS PRN
Status: DISCONTINUED | OUTPATIENT
Start: 2023-07-14 | End: 2023-07-17 | Stop reason: HOSPADM

## 2023-07-14 RX ORDER — LANOLIN ALCOHOL/MO/W.PET/CERES
400 CREAM (GRAM) TOPICAL DAILY
COMMUNITY

## 2023-07-14 RX ORDER — NADOLOL 40 MG/1
40 TABLET ORAL DAILY
Status: ON HOLD | COMMUNITY
End: 2023-07-14

## 2023-07-14 RX ORDER — ALBUTEROL SULFATE 90 UG/1
2 AEROSOL, METERED RESPIRATORY (INHALATION) EVERY 4 HOURS PRN
Status: DISCONTINUED | OUTPATIENT
Start: 2023-07-14 | End: 2023-07-17 | Stop reason: HOSPADM

## 2023-07-14 RX ORDER — ALBUTEROL SULFATE 90 UG/1
2 AEROSOL, METERED RESPIRATORY (INHALATION) EVERY 6 HOURS PRN
COMMUNITY

## 2023-07-14 RX ORDER — ASPIRIN 81 MG/1
81 TABLET ORAL DAILY
COMMUNITY

## 2023-07-14 RX ORDER — OMEPRAZOLE 20 MG/1
20 CAPSULE, DELAYED RELEASE ORAL 2 TIMES DAILY
Status: ON HOLD | COMMUNITY
End: 2023-07-17 | Stop reason: HOSPADM

## 2023-07-14 RX ORDER — SODIUM CHLORIDE 1 G/1
2 TABLET ORAL ONCE
Status: COMPLETED | OUTPATIENT
Start: 2023-07-14 | End: 2023-07-14

## 2023-07-14 RX ORDER — AMIODARONE HYDROCHLORIDE 200 MG/1
200 TABLET ORAL 2 TIMES DAILY
Status: DISCONTINUED | OUTPATIENT
Start: 2023-07-14 | End: 2023-07-17 | Stop reason: HOSPADM

## 2023-07-14 RX ORDER — ALBUTEROL SULFATE 2.5 MG/3ML
2.5 SOLUTION RESPIRATORY (INHALATION) EVERY 4 HOURS PRN
Status: DISCONTINUED | OUTPATIENT
Start: 2023-07-14 | End: 2023-07-14

## 2023-07-14 RX ORDER — QUETIAPINE FUMARATE 25 MG/1
25 TABLET, FILM COATED ORAL 2 TIMES DAILY
Status: ON HOLD | COMMUNITY
End: 2023-07-17 | Stop reason: HOSPADM

## 2023-07-14 RX ORDER — NITROFURANTOIN 25; 75 MG/1; MG/1
100 CAPSULE ORAL DAILY
Status: ON HOLD | COMMUNITY
End: 2023-07-17 | Stop reason: HOSPADM

## 2023-07-14 RX ADMIN — SODIUM CHLORIDE 2 G: 1 TABLET ORAL at 00:29

## 2023-07-14 RX ADMIN — TAMSULOSIN HYDROCHLORIDE 0.4 MG: 0.4 CAPSULE ORAL at 21:53

## 2023-07-14 RX ADMIN — CLOBETASOL PROPIONATE: 0.5 OINTMENT TOPICAL at 10:12

## 2023-07-14 RX ADMIN — ENOXAPARIN SODIUM 40 MG: 100 INJECTION SUBCUTANEOUS at 21:53

## 2023-07-14 RX ADMIN — AMIODARONE HYDROCHLORIDE 200 MG: 200 TABLET ORAL at 21:53

## 2023-07-14 RX ADMIN — PANTOPRAZOLE SODIUM 40 MG: 40 TABLET, DELAYED RELEASE ORAL at 06:08

## 2023-07-14 RX ADMIN — TROSPIUM CHLORIDE 20 MG: 20 TABLET, FILM COATED ORAL at 17:59

## 2023-07-14 RX ADMIN — CLOBETASOL PROPIONATE: 0.5 OINTMENT TOPICAL at 22:02

## 2023-07-14 RX ADMIN — Medication 10 ML: at 21:53

## 2023-07-14 RX ADMIN — SODIUM ZIRCONIUM CYCLOSILICATE 10 G: 10 POWDER, FOR SUSPENSION ORAL at 12:57

## 2023-07-14 RX ADMIN — ENOXAPARIN SODIUM 40 MG: 100 INJECTION SUBCUTANEOUS at 10:11

## 2023-07-14 RX ADMIN — METOPROLOL TARTRATE 50 MG: 50 TABLET, FILM COATED ORAL at 10:12

## 2023-07-14 RX ADMIN — OXYCODONE AND ACETAMINOPHEN 1.5 TABLET: 5; 325 TABLET ORAL at 11:53

## 2023-07-14 RX ADMIN — OXYCODONE AND ACETAMINOPHEN 1.5 TABLET: 5; 325 TABLET ORAL at 21:54

## 2023-07-14 RX ADMIN — FERROUS SULFATE TAB EC 325 MG (65 MG FE EQUIVALENT) 325 MG: 325 (65 FE) TABLET DELAYED RESPONSE at 17:59

## 2023-07-14 RX ADMIN — ACETAMINOPHEN 650 MG: 325 TABLET ORAL at 10:31

## 2023-07-14 RX ADMIN — TROSPIUM CHLORIDE 20 MG: 20 TABLET, FILM COATED ORAL at 06:08

## 2023-07-14 RX ADMIN — INSULIN GLARGINE 75 UNITS: 100 INJECTION, SOLUTION SUBCUTANEOUS at 12:57

## 2023-07-14 RX ADMIN — Medication 10 ML: at 10:13

## 2023-07-14 RX ADMIN — PANTOPRAZOLE SODIUM 40 MG: 40 TABLET, DELAYED RELEASE ORAL at 17:59

## 2023-07-14 RX ADMIN — DOCUSATE SODIUM 100 MG: 100 CAPSULE, LIQUID FILLED ORAL at 21:53

## 2023-07-14 RX ADMIN — CHOLECALCIFEROL TAB 125 MCG (5000 UNIT) 5000 UNITS: 125 TAB at 10:12

## 2023-07-14 RX ADMIN — QUETIAPINE FUMARATE 50 MG: 50 TABLET, EXTENDED RELEASE ORAL at 21:53

## 2023-07-14 RX ADMIN — AMIODARONE HYDROCHLORIDE 200 MG: 200 TABLET ORAL at 12:57

## 2023-07-14 RX ADMIN — METOPROLOL TARTRATE 50 MG: 50 TABLET, FILM COATED ORAL at 21:53

## 2023-07-14 RX ADMIN — HYDROXYZINE HYDROCHLORIDE 25 MG: 25 TABLET, FILM COATED ORAL at 10:12

## 2023-07-14 RX ADMIN — HYDROXYZINE HYDROCHLORIDE 25 MG: 25 TABLET, FILM COATED ORAL at 17:59

## 2023-07-14 RX ADMIN — CEFTRIAXONE SODIUM 2000 MG: 2 INJECTION, POWDER, FOR SOLUTION INTRAMUSCULAR; INTRAVENOUS at 13:16

## 2023-07-14 ASSESSMENT — PAIN DESCRIPTION - DESCRIPTORS
DESCRIPTORS: BURNING;DISCOMFORT;SHARP;SHOOTING
DESCRIPTORS: DISCOMFORT

## 2023-07-14 ASSESSMENT — PAIN DESCRIPTION - ORIENTATION
ORIENTATION: LEFT;RIGHT
ORIENTATION: LEFT;RIGHT
ORIENTATION: RIGHT;LEFT

## 2023-07-14 ASSESSMENT — PAIN DESCRIPTION - FREQUENCY: FREQUENCY: CONTINUOUS

## 2023-07-14 ASSESSMENT — PAIN SCALES - GENERAL
PAINLEVEL_OUTOF10: 0
PAINLEVEL_OUTOF10: 8
PAINLEVEL_OUTOF10: 10
PAINLEVEL_OUTOF10: 8

## 2023-07-14 ASSESSMENT — PAIN DESCRIPTION - LOCATION
LOCATION: GENERALIZED;LEG
LOCATION: LEG;BUTTOCKS
LOCATION: LEG

## 2023-07-14 ASSESSMENT — PAIN DESCRIPTION - PAIN TYPE: TYPE: CHRONIC PAIN

## 2023-07-14 ASSESSMENT — PAIN DESCRIPTION - ONSET: ONSET: ON-GOING

## 2023-07-14 NOTE — PROGRESS NOTES
Physical Therapy  Facility/Department: [facility]  Physical Therapy Initial Assessment    Name: Brenda Melgoza  : 1965  MRN: [id number]  Date of Service: 2023    Discharge Recommendations:  Patient would benefit from continued therapy after discharge    Pt currently functioning below baseline. Recommend daily inpatient skilled therapy at time of discharge to maximize long term outcomes and prevent re-admission. Please refer to AM-PAC score for current level of function. PER HPI: Brenda Melgoza is a 60 y.o. Non- / non  male who presents with No chief complaint on file. and is admitted to the hospital for the management of Hyponatremia. This 53-year-old gentleman was brought in from home for multiple issues. He tells me that he possibly had some rectal bleeding but there is no other evidence of this. He has a chronic indwelling Clemons and appears to have a UTI. He also has severely low sodium at 117. Just recently got released from a nursing facility and is at home apparently unable to take care of himself as he now has multiple excoriations and wounds on legs and back and buttocks. Patient Diagnosis(es): The primary encounter diagnosis was General weakness. Diagnoses of Urinary tract infection associated with indwelling urethral catheter, initial encounter ([address]) and Bilateral lower leg cellulitis were also pertinent to this visit.   Past Medical History:  has a past medical history of A-fib ([address]), Anxiety and depression, Back pain, chronic, BPH (benign prostatic hyperplasia), Cellulitis of left lower extremity, Cellulitis of right lower extremity, CHF (congestive heart failure) ([address]), Chronic respiratory failure with hypoxia ([address]), Cirrhosis ([address]), Diabetes mellitus ([address]), Epididymoorchitis, GERD (gastroesophageal reflux disease), H/O cardiac catheterization, Hepatitis, Hiatal hernia, History of alcohol abuse, Hyperlipidemia, Hypertension, IBS (irritable bowel syndrome),

## 2023-07-14 NOTE — CARE COORDINATION
Social work: confirmed pt desire to to to [facility] at discharge. Faxed updates x2 today. Will need jennifer, Rx and discharge order for snf at discharge.    Report: [phone]  Fax: [phone]  Tash carlisle

## 2023-07-14 NOTE — PLAN OF CARE
Problem: Skin/Tissue Integrity  Goal: Absence of new skin breakdown  Description: 1. Monitor for areas of redness and/or skin breakdown  2. Assess vascular access sites hourly  3. Every 4-6 hours minimum:  Change oxygen saturation probe site  4. Every 4-6 hours:  If on nasal continuous positive airway pressure, respiratory therapy assess nares and determine need for appliance change or resting period.   7/14/2023 1510 by Chuck Naranjo RN  Outcome: Progressing  7/14/2023 0524 by Ron Quintanilla RN  Outcome: Progressing     Problem: Discharge Planning  Goal: Discharge to home or other facility with appropriate resources  7/14/2023 1510 by Chuck Naranjo RN  Outcome: Progressing  7/14/2023 0524 by Ron Quintanilla RN  Outcome: Progressing  Flowsheets  Taken 7/13/2023 2000 by Ron Quintanilla RN  Discharge to home or other facility with appropriate resources:   Identify barriers to discharge with patient and caregiver   Arrange for needed discharge resources and transportation as appropriate   Identify discharge learning needs (meds, wound care, etc)   Refer to discharge planning if patient needs post-hospital services based on physician order or complex needs related to functional status, cognitive ability or social support system  Taken 7/13/2023 1708 by Nara Cobb RN  Discharge to home or other facility with appropriate resources:   Identify barriers to discharge with patient and caregiver   Identify discharge learning needs (meds, wound care, etc)     Problem: Pain  Goal: Verbalizes/displays adequate comfort level or baseline comfort level  7/14/2023 1510 by Chuck Naranjo RN  Outcome: Progressing  7/14/2023 0524 by Ron Quintanilla RN  Outcome: Progressing  Flowsheets (Taken 7/13/2023 2000)  Verbalizes/displays adequate comfort level or baseline comfort level:   Encourage patient to monitor pain and request assistance   Assess pain using appropriate pain scale   Administer analgesics based on type and

## 2023-07-14 NOTE — PROGRESS NOTES
Transitions of Care Pharmacy Service   Medication Review    The patient's list of current home medications has been reviewed. Source(s) of information: patient, Epic, [facility], S[employer]s refill report, med bottles      PROVIDER ACTION REQUESTED  Medications that need to be addressed by a physician/nurse practitioner:    Medication Action Requested        Remaining med list is ready for physician review (clonazepam, magox, etc)         Please feel free to call me with any questions about this encounter. Thank you. Myla Favre, [facility]   Transitions of Care Pharmacy Service  Phone:  [phone]  Fax: [phone]      Electronically signed by Myla Favre, [facility] on 7/14/2023 at 6:29 PM           Medications Prior to Admission:   CLONAZEPAM PO, Take 0.5-1 mg by mouth See Admin Instructions Indications: 1mg AM, 0.5mg at lunch, 1mg PM  nitrofurantoin, macrocrystal-monohydrate, (MACROBID) 100 MG capsule, Take 1 capsule by mouth Daily  omeprazole (PRILOSEC) 20 MG delayed release capsule, Take 1 capsule by mouth 2 times daily  QUEtiapine (SEROQUEL) 25 MG tablet, Take 1 tablet by mouth 2 times daily  aspirin 81 MG EC tablet, Take 1 tablet by mouth daily  albuterol sulfate HFA (VENTOLIN HFA) 108 (90 Base) MCG/ACT inhaler, Inhale 2 puffs into the lungs every 6 hours as needed for Wheezing  magnesium oxide (MAG-OX) 400 (240 Mg) MG tablet, Take 1 tablet by mouth daily  vitamin D (CHOLECALCIFEROL) 37072 UNIT CAPS, Take 1 capsule by mouth once a week Indications: Fridays One time a day every Friday  hydrOXYzine HCl (ATARAX) 25 MG tablet, Take 1 tablet by mouth in the morning and 1 tablet in the evening. phenazopyridine (PYRIDIUM) 200 MG tablet, Take 1 tablet by mouth in the morning, at noon, and at bedtime Only has two pills left taking for UTI  oxyCODONE-acetaminophen (PERCOCET) 7.5-325 MG per tablet, Take 1 tablet by mouth every 6 hours as needed for Pain.  Max Daily Amount: 4 tablets  QUEtiapine (SEROQUEL XR)

## 2023-07-14 NOTE — CARE COORDINATION
Met with patient at bedside. Patient reports that he is agreeable to [facility]. LSW informed that she was told patient refused SNF to other staff. Patient states that this is untrue and he plans to go to [facility] at discharge and understands that he cannot care for self at home and will not be going home.

## 2023-07-14 NOTE — CARE COORDINATION
Social work: spoke to [address] at [place] [phone] and she provided the fax [phone]. Pt does have some medicare days left so pt can go there when ready. No precert needed. Pt advised Er  that he would return there if needed. She sent referral and snf is agreeable to take pt. Will need PT/OT EVAL sometime before discharge. Will need jennifer, Rx and discharge order for snf. No precert is needed.       Report phone: [phone]   Rubina carlisle

## 2023-07-14 NOTE — DISCHARGE INSTR - COC
Continuity of Care Form    Patient Name: Kimberly Selby   :  1965  MRN:  [id number]    Admit date:  2023  Discharge date:  2023    Code Status Order: Full Code   Advance Directives:     Admitting Physician:  Beverley Anderson DO  PCP: Mariajose Johnson MD    Discharging Nurse: [facility] Unit/Room#: 1110/1110-01  Discharging Unit Phone Number: [phone]    Emergency Contact:   Extended Emergency Contact Information  Primary Emergency Contact: Frandy Kelly of [address] [place] Phone: [phone]  Relation: Child  Secondary Emergency Contact: Chris Almendarez of [address] [address] [place] Phone: [phone]  Work Phone: [phone]  Mobile Phone: [phone]  Relation: Child    Past Surgical History:  Past Surgical History:   Procedure Laterality Date    ABDOMEN SURGERY      hernia repair x4 (ventral)    COLONOSCOPY          COLONOSCOPY N/A 2023    COLONOSCOPY DIAGNOSTIC performed by Arielle Nielsen MD at [address]  2023    COLONOSCOPY N/A 2023    COLONOSCOPY DIAGNOSTIC performed by Arielle Nielsen MD at [address]  2018    CYSTOSCOPY  2019    CYSTOSCOPY N/A 2019    CYSTOSCOPY DILATION performed by Hector Vences MD at [address] N/A 2019    CYSTOSCOPY/ DILATION NICOLE CATHETER EXCHANGE performed by Hector Vences MD at [address] N/A 2022    CYSTOSCOPY, REMOVAL OF SMALL BLADDER STONE AND BLADDER IRRIGATION performed by Hector Vences MD at [address] [facility], [address] [facility]  2018    repair and reduction of recurrent incarcerated incisional hernia with mesh    ID CYSTOURETHROSCOPY N/A 2018    CYSTOSCOPY Dorys Munguia performed by Hector Vences MD at [address] EGD TRANSORAL BIOPSY SINGLE/MULTIPLE N/A 2017    EGD BIOPSY performed by Jacoby Torres MD at [address] I&D [address] [address] [place]

## 2023-07-14 NOTE — PROGRESS NOTES
[facility]  Consult Note       NAME:  Janina Powers  MEDICAL RECORD NUMBER:  [id number]  AGE: 60 y.o. GENDER: male  : 1965  TODAY'S DATE:  2023    Subjective:      Janina Powers is a 60 y.o. male with inpatient referral to [facility] for:  Buttock wounds related to incontinence and skin fold concerns      Wound Identification:  Wound Type:  moisture associated skin damage and intertriginous moisture damage  Contributing Factors: venous stasis, chronic pressure, decreased mobility, shear force, obesity, incontinence of stool, and incontinence of urine    Wound History: the patient has a history of moisture associated skin damage and intertriginous skin damage on previous hospital admission. He is a poor historian and unable to explain usual care. Current Wound Care Treatment:  zinc paste being used, Interdry being used    Patient Goal of Care:  [x] Wound Healing  [] Odor Control  [] Palliative Care  [] Pain Control   [x] Other: infection        PAST MEDICAL HISTORY        Diagnosis Date    A-fib ([address]) 2023    Anxiety and depression     Back pain, chronic     BPH (benign prostatic hyperplasia)     Cellulitis of left lower extremity 2017    Cellulitis of right lower extremity 2017    CHF (congestive heart failure) (HCC)     Chronic respiratory failure with hypoxia ([address])     prn    Cirrhosis ([address])     Diabetes mellitus ([address])     not checking bloodsugar at home    Epididymoorchitis     GERD (gastroesophageal reflux disease)     H/O cardiac catheterization not sure of date    no stents    Hepatitis     Pt does not know the type.      Hiatal hernia     History of alcohol abuse     Sober since     Hyperlipidemia     not taking any medication    Hypertension     IBS (irritable bowel syndrome)     Incisional hernia with obstruction but no gangrene 2018    Klebsiella sepsis ([address])     Metabolic encephalopathy     Morbid obesity Anemia

## 2023-07-14 NOTE — CONSULTS
in the setting of chronic obstructive uropathy with baseline creatinine of around 1.3-1.5 mg/dl. Morbid obesity. Obstructive sleep apnea with hypercapnia. Diabetes type 2. History of alcoholic liver cirrhosis with portal hypertension. Hypertension. Chronic respiratory failure requiring supplemental oxygen at home. A-fib. Intertriginous dermatitis. Bacteremia with blood culture positive for gram-negative rods 7/13/2023. Plan:     Continue sodium checks every 6 hours. Depending on next sodium value will start salt tabs/diuretics if needed. Monitor strict I's and O's and renal function. Keep fluid striction's at 1200 cc/day. Will give a dose of Lokelma and placed on low potassium diet. Serum osmolarity including urine studies of urine osmolarity, urine sodium ordered as well. BMP in AM.  Will follow. Nutrition   Please ensure that patient is on a renal diet/TF. Avoid nephrotoxic drugs/contrast exposure. Thank you for the consultation. Please do not hesitate to contact us for any further questions/concerns. Phong South MD  [facility]     This note is created with the assistance of a speech-recognition program. While intending to generate a document that actually reflects the content of the visit, no guarantees can be provided that every mistake has been identified and corrected by editing.

## 2023-07-14 NOTE — PROGRESS NOTES
[facility]  Office: [phone]  Scott Fernandes, DO, Jason Dys, DO, Khurram Dick, DO, Krystal Pastmariam Blood, DO, Krista Arroyo MD, Freda Loza MD, Shaggy Mojica MD, Yoel Paula MD,  Alexis Rendon MD, Marcel Nelson MD, Lexi Ravi, DO, Niraj Barnes MD,  Ally Steen MD, Maninder Hernandez MD, Cinthya Batista, DO, Gerson Santos MD,  Clint Leal, DO, Bibi Patel MD, Jonnie Meza MD, Margo Ricketts MD, Marlon Garcia MD,  Kelsey Roy MD, Tete Islas MD, Pilo Weber DO, Lauri Li MD,  Everardo Skinner MD, Yusef Hall, Clarence Fierro, CNP, Marva Hager, CNP, Enzo Starr, CNP,  Rama Cordova, [facility], Elva Montana, CNP, Marielena Duenas, CNP, Akil Ulloa, CNP, Juliet Matthews, CNP, Melia Huynh, CNP, Myriam Polanco, PADaC, Chi Peters, CNS, Sarahy Lopez, CNP, Marilee Murillo, [facility]    Progress Note    7/14/2023    7:36 AM    Name:   Ankit Severino  MRN:     [id number]     Acct:      [de-identified]   Room:   [address] [place] [address] Day:  1  Admit Date:  7/13/2023 10:02 AM    PCP:   Ramo Valdez MD  Code Status:  Full Code    Subjective:     C/C: sepsis  Interval History Status: not changed. States \"I feel like shit\", when asked to elaborate on symptoms he says \"I feel like crap\"    I then try and ask specific symptoms-he says he has pain all over, then doesn't answer any additional questions and falls back asleep    Brief History: This 69-year-old gentleman was brought in from home for multiple issues. He tells me that he possibly had some rectal bleeding but there is no other evidence of this. He has a chronic indwelling Clemons and appears to have a UTI. He also has severely low sodium at 117.   Just recently got released from a nursing facility and is at home apparently unable to take care of himself as he now has multiple excoriations and wounds on legs and back

## 2023-07-14 NOTE — PROGRESS NOTES
Occupational Therapy  Facility/Department: [facility] ICU  Occupational Therapy Initial Assessment    Name: Hari Burkett  : 1965  MRN: [id number]  Date of Service: 2023    Discharge Recommendations:  Patient would benefit from continued therapy after discharge  OT Equipment Recommendations  Equipment Needed:  (CTA)    Pt currently functioning below baseline. Recommend daily inpatient skilled therapy at time of discharge to maximize long term outcomes and prevent re-admission. Please refer to AM-PAC score for current level of function. RN reports patient is medically stable for therapy treatment this date. Chart reviewed prior to treatment and patient is agreeable for therapy. All lines intact and patient positioned comfortably at end of treatment. All patient needs addressed prior to ending therapy session. Patient Diagnosis(es): The primary encounter diagnosis was General weakness. Diagnoses of Urinary tract infection associated with indwelling urethral catheter, initial encounter ([address]) and Bilateral lower leg cellulitis were also pertinent to this visit.   Past Medical History:  has a past medical history of A-fib ([address]), Anxiety and depression, Back pain, chronic, BPH (benign prostatic hyperplasia), Cellulitis of left lower extremity, Cellulitis of right lower extremity, CHF (congestive heart failure) ([address]), Chronic respiratory failure with hypoxia ([address]), Cirrhosis ([address]), Diabetes mellitus ([address]), Epididymoorchitis, GERD (gastroesophageal reflux disease), H/O cardiac catheterization, Hepatitis, Hiatal hernia, History of alcohol abuse, Hyperlipidemia, Hypertension, IBS (irritable bowel syndrome), Incisional hernia with obstruction but no gangrene, Klebsiella sepsis ([address]), Metabolic encephalopathy, Morbid obesity ([address]), Neuropathy, On home oxygen therapy, On home oxygen therapy, Pancreatitis, Poisoning by insulin and oral hypoglycemic (antidiabetic) drugs, accidental (unintentional), initial

## 2023-07-14 NOTE — PLAN OF CARE
Problem: Skin/Tissue Integrity  Goal: Absence of new skin breakdown  Description: 1. Monitor for areas of redness and/or skin breakdown  2. Assess vascular access sites hourly  3. Every 4-6 hours minimum:  Change oxygen saturation probe site  4. Every 4-6 hours:  If on nasal continuous positive airway pressure, respiratory therapy assess nares and determine need for appliance change or resting period.   Outcome: Progressing     Problem: Discharge Planning  Goal: Discharge to home or other facility with appropriate resources  Outcome: Progressing  Flowsheets  Taken 7/13/2023 2000 by Drenda Prader, RN  Discharge to home or other facility with appropriate resources:   Identify barriers to discharge with patient and caregiver   Arrange for needed discharge resources and transportation as appropriate   Identify discharge learning needs (meds, wound care, etc)   Refer to discharge planning if patient needs post-hospital services based on physician order or complex needs related to functional status, cognitive ability or social support system  Taken 7/13/2023 1708 by Dionicio Holland RN  Discharge to home or other facility with appropriate resources:   Identify barriers to discharge with patient and caregiver   Identify discharge learning needs (meds, wound care, etc)     Problem: Pain  Goal: Verbalizes/displays adequate comfort level or baseline comfort level  Outcome: Progressing  Flowsheets (Taken 7/13/2023 2000)  Verbalizes/displays adequate comfort level or baseline comfort level:   Encourage patient to monitor pain and request assistance   Assess pain using appropriate pain scale   Administer analgesics based on type and severity of pain and evaluate response   Implement non-pharmacological measures as appropriate and evaluate response   Notify Licensed Independent Practitioner if interventions unsuccessful or patient reports new pain   Consider cultural and social influences on pain and pain management

## 2023-07-15 LAB
ANION GAP SERPL CALCULATED.3IONS-SCNC: 5 MMOL/L (ref 9–17)
BUN SERPL-MCNC: 21 MG/DL (ref 6–20)
BUN/CREAT SERPL: 19 (ref 9–20)
CALCIUM SERPL-MCNC: 10 MG/DL (ref 8.6–10.4)
CHLORIDE SERPL-SCNC: 86 MMOL/L (ref 98–107)
CO2 SERPL-SCNC: 35 MMOL/L (ref 20–31)
CREAT SERPL-MCNC: 1.1 MG/DL (ref 0.7–1.2)
ERYTHROCYTE [DISTWIDTH] IN BLOOD BY AUTOMATED COUNT: 14.5 % (ref 11.8–14.4)
GFR SERPL CREATININE-BSD FRML MDRD: >60 ML/MIN/1.73M2
GLUCOSE SERPL-MCNC: 124 MG/DL (ref 70–99)
HCT VFR BLD AUTO: 29.1 % (ref 40.7–50.3)
HGB BLD-MCNC: 9 G/DL (ref 13–17)
MCH RBC QN AUTO: 26.9 PG (ref 25.2–33.5)
MCHC RBC AUTO-ENTMCNC: 30.9 G/DL (ref 28.4–34.8)
MCV RBC AUTO: 87.1 FL (ref 82.6–102.9)
METER GLUCOSE: 137 MG/DL (ref 75–110)
METER GLUCOSE: 180 MG/DL (ref 75–110)
METER GLUCOSE: 188 MG/DL (ref 75–110)
METER GLUCOSE: 190 MG/DL (ref 75–110)
MICROORGANISM SPEC CULT: ABNORMAL
MICROORGANISM SPEC CULT: NORMAL
NRBC BLD-RTO: 0 PER 100 WBC
PLATELET # BLD AUTO: 91 K/UL (ref 138–453)
PMV BLD AUTO: 9.2 FL (ref 8.1–13.5)
POTASSIUM SERPL-SCNC: 4.4 MMOL/L (ref 3.7–5.3)
POTASSIUM SERPL-SCNC: 4.9 MMOL/L (ref 3.7–5.3)
RBC # BLD AUTO: 3.34 M/UL (ref 4.21–5.77)
SERVICE CMNT-IMP: ABNORMAL
SERVICE CMNT-IMP: ABNORMAL
SODIUM SERPL-SCNC: 125 MMOL/L (ref 135–144)
SODIUM SERPL-SCNC: 126 MMOL/L (ref 135–144)
SODIUM SERPL-SCNC: 129 MMOL/L (ref 135–144)
SPECIMEN DESCRIPTION: ABNORMAL
SPECIMEN DESCRIPTION: ABNORMAL
SPECIMEN DESCRIPTION: NORMAL
WBC OTHER # BLD: 5.3 K/UL (ref 3.5–11.3)

## 2023-07-15 PROCEDURE — 99232 SBSQ HOSP IP/OBS MODERATE 35: CPT | Performed by: INTERNAL MEDICINE

## 2023-07-15 PROCEDURE — 2700000000 HC OXYGEN THERAPY PER DAY

## 2023-07-15 PROCEDURE — 2060000000 HC ICU INTERMEDIATE R&B

## 2023-07-15 PROCEDURE — 80048 BASIC METABOLIC PNL TOTAL CA: CPT

## 2023-07-15 PROCEDURE — 6370000000 HC RX 637 (ALT 250 FOR IP): Performed by: NURSE PRACTITIONER

## 2023-07-15 PROCEDURE — 2500000003 HC RX 250 WO HCPCS: Performed by: NURSE PRACTITIONER

## 2023-07-15 PROCEDURE — 6370000000 HC RX 637 (ALT 250 FOR IP): Performed by: INTERNAL MEDICINE

## 2023-07-15 PROCEDURE — 6360000002 HC RX W HCPCS: Performed by: INTERNAL MEDICINE

## 2023-07-15 PROCEDURE — 85027 COMPLETE CBC AUTOMATED: CPT

## 2023-07-15 PROCEDURE — 84132 ASSAY OF SERUM POTASSIUM: CPT

## 2023-07-15 PROCEDURE — 2580000003 HC RX 258: Performed by: INTERNAL MEDICINE

## 2023-07-15 PROCEDURE — 84295 ASSAY OF SERUM SODIUM: CPT

## 2023-07-15 PROCEDURE — 82947 ASSAY GLUCOSE BLOOD QUANT: CPT

## 2023-07-15 PROCEDURE — 36415 COLL VENOUS BLD VENIPUNCTURE: CPT

## 2023-07-15 PROCEDURE — 94761 N-INVAS EAR/PLS OXIMETRY MLT: CPT

## 2023-07-15 RX ORDER — BUMETANIDE 0.25 MG/ML
1 INJECTION INTRAMUSCULAR; INTRAVENOUS ONCE
Status: COMPLETED | OUTPATIENT
Start: 2023-07-15 | End: 2023-07-15

## 2023-07-15 RX ORDER — CLONAZEPAM 0.5 MG/1
1 TABLET ORAL 2 TIMES DAILY
Status: DISCONTINUED | OUTPATIENT
Start: 2023-07-15 | End: 2023-07-17 | Stop reason: HOSPADM

## 2023-07-15 RX ORDER — MORPHINE SULFATE 2 MG/ML
2 INJECTION, SOLUTION INTRAMUSCULAR; INTRAVENOUS EVERY 4 HOURS PRN
Status: DISCONTINUED | OUTPATIENT
Start: 2023-07-15 | End: 2023-07-17 | Stop reason: HOSPADM

## 2023-07-15 RX ORDER — CLONAZEPAM 0.5 MG/1
0.5 TABLET ORAL
Status: DISCONTINUED | OUTPATIENT
Start: 2023-07-15 | End: 2023-07-17 | Stop reason: HOSPADM

## 2023-07-15 RX ADMIN — CLOBETASOL PROPIONATE: 0.5 OINTMENT TOPICAL at 07:57

## 2023-07-15 RX ADMIN — Medication 10 ML: at 22:41

## 2023-07-15 RX ADMIN — AMIODARONE HYDROCHLORIDE 200 MG: 200 TABLET ORAL at 20:21

## 2023-07-15 RX ADMIN — MORPHINE SULFATE 2 MG: 2 INJECTION, SOLUTION INTRAMUSCULAR; INTRAVENOUS at 16:19

## 2023-07-15 RX ADMIN — OXYCODONE AND ACETAMINOPHEN 1.5 TABLET: 5; 325 TABLET ORAL at 20:20

## 2023-07-15 RX ADMIN — METOPROLOL TARTRATE 50 MG: 50 TABLET, FILM COATED ORAL at 20:21

## 2023-07-15 RX ADMIN — CEFTRIAXONE SODIUM 2000 MG: 2 INJECTION, POWDER, FOR SOLUTION INTRAMUSCULAR; INTRAVENOUS at 13:53

## 2023-07-15 RX ADMIN — LEVOTHYROXINE SODIUM 200 MCG: 200 TABLET ORAL at 05:39

## 2023-07-15 RX ADMIN — CLONAZEPAM 0.5 MG: 0.5 TABLET ORAL at 13:51

## 2023-07-15 RX ADMIN — CLOBETASOL PROPIONATE: 0.5 OINTMENT TOPICAL at 21:35

## 2023-07-15 RX ADMIN — ENOXAPARIN SODIUM 40 MG: 100 INJECTION SUBCUTANEOUS at 21:53

## 2023-07-15 RX ADMIN — MORPHINE SULFATE 2 MG: 2 INJECTION, SOLUTION INTRAMUSCULAR; INTRAVENOUS at 22:39

## 2023-07-15 RX ADMIN — HYDROXYZINE HYDROCHLORIDE 25 MG: 25 TABLET, FILM COATED ORAL at 16:19

## 2023-07-15 RX ADMIN — BUMETANIDE 1 MG: 0.25 INJECTION INTRAMUSCULAR; INTRAVENOUS at 13:58

## 2023-07-15 RX ADMIN — INSULIN GLARGINE 75 UNITS: 100 INJECTION, SOLUTION SUBCUTANEOUS at 21:53

## 2023-07-15 RX ADMIN — TAMSULOSIN HYDROCHLORIDE 0.4 MG: 0.4 CAPSULE ORAL at 20:21

## 2023-07-15 RX ADMIN — PANTOPRAZOLE SODIUM 40 MG: 40 TABLET, DELAYED RELEASE ORAL at 07:57

## 2023-07-15 RX ADMIN — AMIODARONE HYDROCHLORIDE 200 MG: 200 TABLET ORAL at 07:57

## 2023-07-15 RX ADMIN — DOCUSATE SODIUM 100 MG: 100 CAPSULE, LIQUID FILLED ORAL at 20:20

## 2023-07-15 RX ADMIN — Medication 10 ML: at 08:01

## 2023-07-15 RX ADMIN — TROSPIUM CHLORIDE 20 MG: 20 TABLET, FILM COATED ORAL at 16:19

## 2023-07-15 RX ADMIN — HYDROXYZINE HYDROCHLORIDE 25 MG: 25 TABLET, FILM COATED ORAL at 07:57

## 2023-07-15 RX ADMIN — METOPROLOL TARTRATE 50 MG: 50 TABLET, FILM COATED ORAL at 07:57

## 2023-07-15 RX ADMIN — ACETAMINOPHEN 650 MG: 325 TABLET ORAL at 11:54

## 2023-07-15 RX ADMIN — CLONAZEPAM 1 MG: 0.5 TABLET ORAL at 21:54

## 2023-07-15 RX ADMIN — TROSPIUM CHLORIDE 20 MG: 20 TABLET, FILM COATED ORAL at 05:39

## 2023-07-15 RX ADMIN — OXYCODONE AND ACETAMINOPHEN 1.5 TABLET: 5; 325 TABLET ORAL at 08:06

## 2023-07-15 RX ADMIN — QUETIAPINE FUMARATE 50 MG: 50 TABLET, EXTENDED RELEASE ORAL at 20:20

## 2023-07-15 RX ADMIN — ENOXAPARIN SODIUM 40 MG: 100 INJECTION SUBCUTANEOUS at 07:57

## 2023-07-15 RX ADMIN — OXYCODONE AND ACETAMINOPHEN 1.5 TABLET: 5; 325 TABLET ORAL at 14:00

## 2023-07-15 RX ADMIN — PANTOPRAZOLE SODIUM 40 MG: 40 TABLET, DELAYED RELEASE ORAL at 17:22

## 2023-07-15 RX ADMIN — INSULIN GLARGINE 75 UNITS: 100 INJECTION, SOLUTION SUBCUTANEOUS at 12:54

## 2023-07-15 RX ADMIN — FERROUS SULFATE TAB EC 325 MG (65 MG FE EQUIVALENT) 325 MG: 325 (65 FE) TABLET DELAYED RESPONSE at 17:22

## 2023-07-15 ASSESSMENT — PAIN DESCRIPTION - LOCATION
LOCATION: BUTTOCKS;BACK

## 2023-07-15 ASSESSMENT — PAIN SCALES - GENERAL
PAINLEVEL_OUTOF10: 10
PAINLEVEL_OUTOF10: 7
PAINLEVEL_OUTOF10: 9
PAINLEVEL_OUTOF10: 5
PAINLEVEL_OUTOF10: 9
PAINLEVEL_OUTOF10: 3

## 2023-07-15 ASSESSMENT — PAIN DESCRIPTION - DESCRIPTORS
DESCRIPTORS: BURNING;THROBBING

## 2023-07-15 ASSESSMENT — PAIN DESCRIPTION - ORIENTATION
ORIENTATION: RIGHT;LEFT;LOWER
ORIENTATION: RIGHT;LEFT;MID;LOWER
ORIENTATION: RIGHT;LEFT;MID;LOWER

## 2023-07-15 NOTE — FLOWSHEET NOTE
[address] [facility]  PROGRESS NOTE    Room # 1110/1110-01   Name: Brenda Melgoza            Faith: Caodaism Non-denomitional     Reason for visit: Routine    I visited the patient. Admit Date & Time: 7/13/2023 10:02 AM    Assessment:  Brenda Melgoza is a 60 y.o. male in the hospital because \"hyponatremia. \" Upon entering the room sitting up in bed, eating lunch. Intervention:  I introduced myself and my title as  I offered space for patient  to express feelings, needs, and concerns and provided a ministry presence. Patient shared that his , who is his neighbor, would be visiting today. Left prayer card and offered words of encouragement. Outcome:  Patient expressed gratitude for visit. Plan:  Chaplains will remain available to offer spiritual and emotional support as needed. [    Electronically signed by Peter Varela on 7/15/2023 at 1:42 PM.  [address]       07/15/23 1341   Encounter Summary   Service Provided For: Patient   Referral/Consult From:  [address] S[facility]ism/janae community   Last Encounter  07/15/23   Complexity of Encounter Low   Begin Time 1220   End Time  1230   Total Time Calculated 10 min   Encounter    Type Initial Screen/Assessment   Spiritual/Emotional needs   Type Spiritual Support   Assessment/Intervention/Outcome   Assessment Coping; Hopeful   Intervention Active listening;Discussed relationship with God;Nurtured Hope;Sustaining Presence/Ministry of presence   Outcome Comfort;Encouraged;Engaged in conversation;Expressed Gratitude   Plan and Referrals   Plan/Referrals No future visits requested

## 2023-07-15 NOTE — CARE COORDINATION
Social work; Lakshmi Moon is still following for admission at discharge. Will need jennifer, Rx and discharge order for snf when ready. Pt has some medicare days left. Rep to set up admission is Mallory Nolasco [phone]. Report for Nurses: [phone]  Fax [phone].   Rob carlisle

## 2023-07-15 NOTE — PROGRESS NOTES
MPV 10.0 9.8 9.2     Chemistry:  Recent Labs     07/14/23  0559 07/14/23  1210 07/14/23  1835 07/14/23  2357 07/15/23  0537   *   < > 123* 125* 126*   K 5.4*  --  5.0  --  4.9   CL 82*  --  83*  --  86*   CO2 33*  --  34*  --  35*   GLUCOSE 137*  --  157*  --  124*   BUN 22*  --  21*  --  21*   CREATININE 1.1  --  1.0  --  1.1   MG 2.0  --   --   --   --    ANIONGAP 7*  --  6*  --  5*   LABGLOM >60  --  >60  --  >60   CALCIUM 9.7  --  10.3  --  10.0    < > = values in this interval not displayed. Recent Labs     07/13/23  1820 07/13/23 2029 07/14/23  0559 07/14/23  0953 07/14/23  1124 07/14/23  1757 07/14/23  2118   PROT  --   --  6.3*  --   --   --   --    LABALBU  --   --  2.6*  --   --   --   --    AST  --   --  17  --   --   --   --    ALT  --   --  28  --   --   --   --    ALKPHOS  --   --  314*  --   --   --   --    BILITOT  --   --  1.4*  --   --   --   --    POCGLU 136* 151*  --  171* 174* 150* 141*     ABG:  Lab Results   Component Value Date/Time    POCPH 7.410 04/24/2023 08:18 AM    PHART 7.330 06/18/2014 12:08 PM    POCPCO2 69.3 04/24/2023 08:18 AM    VNY5PLC 68.0 06/18/2014 12:08 PM    POCPO2 100.1 04/24/2023 08:18 AM    PO2ART 84.0 06/18/2014 12:08 PM    POCHCO3 44.0 04/24/2023 08:18 AM    IBW5SPJ 34.9 06/18/2014 12:08 PM    NBEA NOT REPORTED 01/17/2022 11:52 AM    PBEA 16 04/24/2023 08:18 AM    SOF5MVJ NOT REPORTED 01/17/2022 11:52 AM    HSCR1IVL 97 04/24/2023 08:18 AM    D9VDRFTI 96.5 06/18/2014 12:08 PM    FIO2 32.0 04/24/2023 08:18 AM     Lab Results   Component Value Date/Time    SPECIAL 7ML LH 07/13/2023 10:55 AM     Lab Results   Component Value Date/Time    CULTURE CULTURE IN PROGRESS 07/13/2023 11:16 AM       Radiology:  No results found.     Physical Examination:        General appearance:  awakens and no distress  Mental Status:  oriented to person, place and normal affect  Lungs:  clear to auscultation bilaterally, normal effort  Heart:  regular rate and rhythm, no

## 2023-07-15 NOTE — PLAN OF CARE
Problem: Discharge Planning  Goal: Discharge to home or other facility with appropriate resources  Outcome: Progressing     Problem: Pain  Goal: Verbalizes/displays adequate comfort level or baseline comfort level  Outcome: Progressing     Problem: Safety - Adult  Goal: Free from fall injury  Outcome: Progressing     Problem: Neurosensory - Adult  Goal: Achieves stable or improved neurological status  Outcome: Progressing  Goal: Achieves maximal functionality and self care  Outcome: Progressing     Problem: Respiratory - Adult  Goal: Achieves optimal ventilation and oxygenation  Outcome: Progressing     Problem: Cardiovascular - Adult  Goal: Maintains optimal cardiac output and hemodynamic stability  Outcome: Progressing  Goal: Absence of cardiac dysrhythmias or at baseline  Outcome: Progressing     Problem: Skin/Tissue Integrity - Adult  Goal: Skin integrity remains intact  Outcome: Progressing  Goal: Incisions, wounds, or drain sites healing without S/S of infection  Outcome: Progressing  Goal: Oral mucous membranes remain intact  Outcome: Progressing     Problem: Musculoskeletal - Adult  Goal: Return mobility to safest level of function  Outcome: Progressing  Goal: Return ADL status to a safe level of function  Outcome: Progressing     Problem: Gastrointestinal - Adult  Goal: Minimal or absence of nausea and vomiting  Outcome: Progressing  Goal: Maintains or returns to baseline bowel function  Outcome: Progressing  Goal: Maintains adequate nutritional intake  Outcome: Progressing     Problem: Genitourinary - Adult  Goal: Absence of urinary retention  Outcome: Progressing  Goal: Urinary catheter remains patent  Outcome: Progressing     Problem: Infection - Adult  Goal: Absence of infection at discharge  Outcome: Progressing  Goal: Absence of infection during hospitalization  Outcome: Progressing     Problem: Metabolic/Fluid and Electrolytes - Adult  Goal: Electrolytes maintained within normal limits  Outcome:

## 2023-07-15 NOTE — PROGRESS NOTES
124*   CALCIUM 9.7  --  10.3  --  10.0    < > = values in this interval not displayed. Magnesium:    Recent Labs     07/14/23  0559   MG 2.0     Albumin:    Recent Labs     07/14/23  0559   LABALBU 2.6*     BNP:      Lab Results   Component Value Date/Time    BNP NOT REPORTED 06/18/2014 05:41 AM     MARISA:      Lab Results   Component Value Date/Time    MARISA NEGATIVE 11/03/2021 04:12 PM     SPEP:  Lab Results   Component Value Date/Time    PROT 6.3 07/14/2023 05:59 AM    ALBCAL 3.3 11/03/2021 04:12 PM    ALBPCT 51 11/03/2021 04:12 PM    LABALPH 0.2 11/03/2021 04:12 PM    LABALPH 0.6 11/03/2021 04:12 PM    A1PCT 3 11/03/2021 04:12 PM    A2PCT 9 11/03/2021 04:12 PM    LABBETA 1.0 11/03/2021 04:12 PM    BETAPCT 15 11/03/2021 04:12 PM    GAMGLOB 1.5 11/03/2021 04:12 PM    GGPCT 23 11/03/2021 04:12 PM    PATH ELECTRONICALLY SIGNED. Woo Tomas M.D. 01/15/2022 03:00 PM     UPEP:     Lab Results   Component Value Date/Time    LABPE  01/15/2022 03:00 PM     ELEVATED PROTEIN CONCENTRATION. MOST SERUM PROTEINS ARE DETECTED IN THIS URINE.   USUALLY OBSERVED WITH MARKEDLY INCREASED NON-SELECTIVE GLOMERULAR PERMEABILITY (i.e. SEVERE GLOMERULAR DISEASE), CONTAMINATION OF     C3:     Lab Results   Component Value Date/Time    C3 94 11/03/2021 06:27 AM     C4:     Lab Results   Component Value Date/Time    C4 21 11/03/2021 06:27 AM     Hep BsAg:         Lab Results   Component Value Date/Time    HEPBSAG NONREACTIVE 06/19/2014 05:57 PM     Hep C AB:          Lab Results   Component Value Date/Time    HEPCAB NONREACTIVE 06/19/2014 05:57 PM       Urinalysis/Chemistries:      Lab Results   Component Value Date/Time    NITRU NEGATIVE 07/13/2023 11:16 AM    COLORU Yellow 07/13/2023 11:16 AM    PHUR 6.5 07/13/2023 11:16 AM    WBCUA 10 TO 20 07/13/2023 11:16 AM    RBCUA 5 TO 10 07/13/2023 11:16 AM    MUCUS NOT REPORTED 01/15/2022 02:55 PM    TRICHOMONAS NOT REPORTED 01/15/2022 02:55 PM    YEAST NOT REPORTED 01/15/2022 02:55 PM

## 2023-07-15 NOTE — PLAN OF CARE
breakdown  Description: 1. Monitor for areas of redness and/or skin breakdown  2. Assess vascular access sites hourly  3. Every 4-6 hours minimum:  Change oxygen saturation probe site  4. Every 4-6 hours:  If on nasal continuous positive airway pressure, respiratory therapy assess nares and determine need for appliance change or resting period.   7/15/2023 0131 by Tasha Aguiar RN  Outcome: Not Progressing  7/14/2023 1510 by Naresh Person RN  Outcome: Progressing

## 2023-07-15 NOTE — PROGRESS NOTES
Pt refusing bariatric bed despite education on improvement of wound healing and discomfort. Pt stated he would be willing to make transfer to bariatric bed tomorrow.

## 2023-07-16 LAB
ANION GAP SERPL CALCULATED.3IONS-SCNC: 5 MMOL/L (ref 9–17)
BUN SERPL-MCNC: 21 MG/DL (ref 6–20)
BUN/CREAT SERPL: 19 (ref 9–20)
CALCIUM SERPL-MCNC: 10.2 MG/DL (ref 8.6–10.4)
CHLORIDE SERPL-SCNC: 88 MMOL/L (ref 98–107)
CO2 SERPL-SCNC: 37 MMOL/L (ref 20–31)
CREAT SERPL-MCNC: 1.1 MG/DL (ref 0.7–1.2)
ERYTHROCYTE [DISTWIDTH] IN BLOOD BY AUTOMATED COUNT: 14.4 % (ref 11.8–14.4)
GFR SERPL CREATININE-BSD FRML MDRD: >60 ML/MIN/1.73M2
GLUCOSE SERPL-MCNC: 132 MG/DL (ref 70–99)
HCT VFR BLD AUTO: 30.1 % (ref 40.7–50.3)
HGB BLD-MCNC: 9.1 G/DL (ref 13–17)
MCH RBC QN AUTO: 26.9 PG (ref 25.2–33.5)
MCHC RBC AUTO-ENTMCNC: 30.2 G/DL (ref 28.4–34.8)
MCV RBC AUTO: 89.1 FL (ref 82.6–102.9)
METER GLUCOSE: 102 MG/DL (ref 75–110)
METER GLUCOSE: 142 MG/DL (ref 75–110)
METER GLUCOSE: 159 MG/DL (ref 75–110)
NRBC BLD-RTO: 0 PER 100 WBC
PLATELET # BLD AUTO: 85 K/UL (ref 138–453)
PMV BLD AUTO: 9.4 FL (ref 8.1–13.5)
POTASSIUM SERPL-SCNC: 4.4 MMOL/L (ref 3.7–5.3)
RBC # BLD AUTO: 3.38 M/UL (ref 4.21–5.77)
SODIUM SERPL-SCNC: 130 MMOL/L (ref 135–144)
SODIUM SERPL-SCNC: 131 MMOL/L (ref 135–144)
WBC OTHER # BLD: 4.8 K/UL (ref 3.5–11.3)

## 2023-07-16 PROCEDURE — 99232 SBSQ HOSP IP/OBS MODERATE 35: CPT | Performed by: INTERNAL MEDICINE

## 2023-07-16 PROCEDURE — 6360000002 HC RX W HCPCS: Performed by: INTERNAL MEDICINE

## 2023-07-16 PROCEDURE — 6370000000 HC RX 637 (ALT 250 FOR IP): Performed by: NURSE PRACTITIONER

## 2023-07-16 PROCEDURE — 2700000000 HC OXYGEN THERAPY PER DAY

## 2023-07-16 PROCEDURE — 84295 ASSAY OF SERUM SODIUM: CPT

## 2023-07-16 PROCEDURE — 82947 ASSAY GLUCOSE BLOOD QUANT: CPT

## 2023-07-16 PROCEDURE — 36415 COLL VENOUS BLD VENIPUNCTURE: CPT

## 2023-07-16 PROCEDURE — 6370000000 HC RX 637 (ALT 250 FOR IP): Performed by: INTERNAL MEDICINE

## 2023-07-16 PROCEDURE — 94761 N-INVAS EAR/PLS OXIMETRY MLT: CPT

## 2023-07-16 PROCEDURE — 93005 ELECTROCARDIOGRAM TRACING: CPT | Performed by: INTERNAL MEDICINE

## 2023-07-16 PROCEDURE — 85027 COMPLETE CBC AUTOMATED: CPT

## 2023-07-16 PROCEDURE — 80048 BASIC METABOLIC PNL TOTAL CA: CPT

## 2023-07-16 PROCEDURE — 2060000000 HC ICU INTERMEDIATE R&B

## 2023-07-16 PROCEDURE — 2580000003 HC RX 258: Performed by: INTERNAL MEDICINE

## 2023-07-16 RX ORDER — BUMETANIDE 1 MG/1
1 TABLET ORAL DAILY
Status: DISCONTINUED | OUTPATIENT
Start: 2023-07-16 | End: 2023-07-17 | Stop reason: HOSPADM

## 2023-07-16 RX ADMIN — Medication 10 ML: at 21:00

## 2023-07-16 RX ADMIN — PANTOPRAZOLE SODIUM 40 MG: 40 TABLET, DELAYED RELEASE ORAL at 18:46

## 2023-07-16 RX ADMIN — TROSPIUM CHLORIDE 20 MG: 20 TABLET, FILM COATED ORAL at 16:05

## 2023-07-16 RX ADMIN — FERROUS SULFATE TAB EC 325 MG (65 MG FE EQUIVALENT) 325 MG: 325 (65 FE) TABLET DELAYED RESPONSE at 18:32

## 2023-07-16 RX ADMIN — OXYCODONE AND ACETAMINOPHEN 1.5 TABLET: 5; 325 TABLET ORAL at 02:54

## 2023-07-16 RX ADMIN — MORPHINE SULFATE 2 MG: 2 INJECTION, SOLUTION INTRAMUSCULAR; INTRAVENOUS at 15:45

## 2023-07-16 RX ADMIN — HYDROXYZINE HYDROCHLORIDE 25 MG: 25 TABLET, FILM COATED ORAL at 08:44

## 2023-07-16 RX ADMIN — MORPHINE SULFATE 2 MG: 2 INJECTION, SOLUTION INTRAMUSCULAR; INTRAVENOUS at 06:35

## 2023-07-16 RX ADMIN — INSULIN GLARGINE 75 UNITS: 100 INJECTION, SOLUTION SUBCUTANEOUS at 08:42

## 2023-07-16 RX ADMIN — CLONAZEPAM 1 MG: 0.5 TABLET ORAL at 08:43

## 2023-07-16 RX ADMIN — CLONAZEPAM 0.5 MG: 0.5 TABLET ORAL at 11:32

## 2023-07-16 RX ADMIN — HYDROXYZINE HYDROCHLORIDE 25 MG: 25 TABLET, FILM COATED ORAL at 16:05

## 2023-07-16 RX ADMIN — MIDODRINE HYDROCHLORIDE 10 MG: 10 TABLET ORAL at 11:20

## 2023-07-16 RX ADMIN — ENOXAPARIN SODIUM 40 MG: 100 INJECTION SUBCUTANEOUS at 08:43

## 2023-07-16 RX ADMIN — PANTOPRAZOLE SODIUM 40 MG: 40 TABLET, DELAYED RELEASE ORAL at 08:44

## 2023-07-16 RX ADMIN — QUETIAPINE FUMARATE 50 MG: 50 TABLET, EXTENDED RELEASE ORAL at 21:38

## 2023-07-16 RX ADMIN — MIDODRINE HYDROCHLORIDE 10 MG: 10 TABLET ORAL at 08:43

## 2023-07-16 RX ADMIN — METOPROLOL TARTRATE 50 MG: 50 TABLET, FILM COATED ORAL at 08:44

## 2023-07-16 RX ADMIN — ENOXAPARIN SODIUM 40 MG: 100 INJECTION SUBCUTANEOUS at 21:40

## 2023-07-16 RX ADMIN — OXYCODONE AND ACETAMINOPHEN 1.5 TABLET: 5; 325 TABLET ORAL at 17:09

## 2023-07-16 RX ADMIN — SODIUM CHLORIDE: 9 INJECTION, SOLUTION INTRAVENOUS at 16:11

## 2023-07-16 RX ADMIN — CLOBETASOL PROPIONATE: 0.5 OINTMENT TOPICAL at 21:41

## 2023-07-16 RX ADMIN — Medication 10 ML: at 08:58

## 2023-07-16 RX ADMIN — CEFTRIAXONE SODIUM 2000 MG: 2 INJECTION, POWDER, FOR SOLUTION INTRAMUSCULAR; INTRAVENOUS at 16:13

## 2023-07-16 RX ADMIN — MORPHINE SULFATE 2 MG: 2 INJECTION, SOLUTION INTRAMUSCULAR; INTRAVENOUS at 10:43

## 2023-07-16 RX ADMIN — LEVOTHYROXINE SODIUM 200 MCG: 200 TABLET ORAL at 06:37

## 2023-07-16 RX ADMIN — MIDODRINE HYDROCHLORIDE 10 MG: 10 TABLET ORAL at 16:05

## 2023-07-16 RX ADMIN — CLONAZEPAM 1 MG: 0.5 TABLET ORAL at 21:39

## 2023-07-16 RX ADMIN — INSULIN GLARGINE 75 UNITS: 100 INJECTION, SOLUTION SUBCUTANEOUS at 21:40

## 2023-07-16 RX ADMIN — TAMSULOSIN HYDROCHLORIDE 0.4 MG: 0.4 CAPSULE ORAL at 21:39

## 2023-07-16 RX ADMIN — BUMETANIDE 1 MG: 1 TABLET ORAL at 16:19

## 2023-07-16 RX ADMIN — AMIODARONE HYDROCHLORIDE 200 MG: 200 TABLET ORAL at 08:43

## 2023-07-16 RX ADMIN — AMIODARONE HYDROCHLORIDE 200 MG: 200 TABLET ORAL at 21:38

## 2023-07-16 RX ADMIN — METOPROLOL TARTRATE 50 MG: 50 TABLET, FILM COATED ORAL at 21:40

## 2023-07-16 RX ADMIN — TROSPIUM CHLORIDE 20 MG: 20 TABLET, FILM COATED ORAL at 11:20

## 2023-07-16 RX ADMIN — DOCUSATE SODIUM 100 MG: 100 CAPSULE, LIQUID FILLED ORAL at 21:39

## 2023-07-16 RX ADMIN — MORPHINE SULFATE 2 MG: 2 INJECTION, SOLUTION INTRAMUSCULAR; INTRAVENOUS at 21:41

## 2023-07-16 ASSESSMENT — PAIN SCALES - GENERAL
PAINLEVEL_OUTOF10: 9
PAINLEVEL_OUTOF10: 6
PAINLEVEL_OUTOF10: 7
PAINLEVEL_OUTOF10: 9
PAINLEVEL_OUTOF10: 9
PAINLEVEL_OUTOF10: 8
PAINLEVEL_OUTOF10: 10

## 2023-07-16 ASSESSMENT — PAIN DESCRIPTION - LOCATION
LOCATION: BUTTOCKS;BACK
LOCATION: LEG;SACRUM
LOCATION: BUTTOCKS;BACK

## 2023-07-16 ASSESSMENT — PAIN DESCRIPTION - ORIENTATION: ORIENTATION: RIGHT;LEFT;LOWER;MID

## 2023-07-16 NOTE — PROGRESS NOTES
Renal Progress Note    Patient :  Ghazal Lopez; 60 y.o. MRN# [id number]  Location:  1110/1110-01  Attending:  Reagan Anderson DO  Admit Date:  7/13/2023   Hospital Day: 3      Subjective:     Nephrology continuing to follow for hyponatremia, along with chronic kidney disease stage III with baseline creatinine 1.3-1.5, who was admitted, with progressive functional decline and complaints of Clemons catheter leaking as patient has a chronic Clemons at home. Per report he was laying in feces and urine at home. His sodium was noted to be 117 on admission, and potassium 5.3. Also noted to have positive blood cultures for gram-negative rods and started on IV antibiotics. On admission he did require salt tablets however he does have chronic hyponatremia with average levels high 120-130. Patient is on Seroquel and Cymbalta at home. He did receive 1 dose of Lokelma on admission for hyperkalemia. Lab work this morning shows sodium level 130, potassium 4.4, creatinine 1.1. Systolic blood pressures stable on midodrine. He is on 3 L nasal cannula with oxygen saturation 97%. He received one dose of IV Bumex 1mg yesterday afternoon. Urine output overnight close to 3 L. He is negative 6.3 L since admission. Increasing confusion over night per nursing. He did go into atrial fibrillation yesterday afternoon, however he has reported history of this. Remains in atrial fibrillation. Has sacral wound along with wounds on legs. On rocephin. Appears more alert for me this morning compared to yesterday. Conversing with me.     Outpatient Medications:     Medications Prior to Admission: CLONAZEPAM PO, Take 0.5-1 mg by mouth See Admin Instructions Indications: 1mg AM, 0.5mg at lunch, 1mg PM  nitrofurantoin, macrocrystal-monohydrate, (MACROBID) 100 MG capsule, Take 1 capsule by mouth Daily  omeprazole (PRILOSEC) 20 MG delayed release capsule, Take 1 capsule by mouth 2 times daily  QUEtiapine (SEROQUEL) 25 MG tablet, Take 1

## 2023-07-16 NOTE — PROGRESS NOTES
Cleaned up and assessed wounds. Gave patient prn morphine for pain, still in pain after. Will reasses in an hour.

## 2023-07-16 NOTE — PLAN OF CARE
Problem: Skin/Tissue Integrity  Goal: Absence of new skin breakdown  Description: 1. Monitor for areas of redness and/or skin breakdown  2. Assess vascular access sites hourly  3. Every 4-6 hours minimum:  Change oxygen saturation probe site  4. Every 4-6 hours:  If on nasal continuous positive airway pressure, respiratory therapy assess nares and determine need for appliance change or resting period.   Outcome: Progressing     Problem: Discharge Planning  Goal: Discharge to home or other facility with appropriate resources  7/16/2023 0136 by Juanjo Jin RN  Outcome: Progressing     Problem: Pain  Goal: Verbalizes/displays adequate comfort level or baseline comfort level  7/16/2023 0136 by Juanjo Jin RN  Outcome: Progressing  Flowsheets (Taken 7/16/2023 0000)  Verbalizes/displays adequate comfort level or baseline comfort level:   Encourage patient to monitor pain and request assistance   Assess pain using appropriate pain scale   Administer analgesics based on type and severity of pain and evaluate response   Implement non-pharmacological measures as appropriate and evaluate response   Notify Licensed Independent Practitioner if interventions unsuccessful or patient reports new pain     Problem: Safety - Adult  Goal: Free from fall injury  7/16/2023 0136 by Juanjo Jin RN  Outcome: Progressing     Problem: Chronic Conditions and Co-morbidities  Goal: Patient's chronic conditions and co-morbidity symptoms are monitored and maintained or improved  Outcome: Progressing

## 2023-07-16 NOTE — PROGRESS NOTES
[facility]  Office: [phone]26, DO, Sultan Market, DO, Jeannette Dross, DO, Donna Hunt Blood, DO, Valentin Fay MD, Chapincito Berger MD, Abraham Fair MD, Cindy Neumann MD,  Jennifer Louis MD, Lavon Sosa MD, Elvira Phlegm, DO, Nancy Mathew MD,  Jose Patiño MD, Feng Croft MD, Jennifer Vega, DO, Tobias Gomes MD,  Nuha Garcias, DO, Aidan Scott MD, Leticia Lomax MD, Stephon Medina MD, Olivia Menard MD,  Josr Jacobs MD, Terry Nixon MD, Valeriano Castillo DO, Gemini Valencia MD,  Shana Mosqueda MD, Siria Magallon, Vadim Messer, CNP, Brien Fuller, CNP, Leah Cunningham, CNP,  Petey Moran, [facility], Sky Lane, CNP, Marsha Isbell, CNP, Arcelia Watson, CNP, Zac Louis, CNP, Franca Bill, CNP, Garfield Reyna PA-C, Nabeel Marrero, CNS, Lashonda Camacho, [facility], Vielka Albrecht, [address]    Progress Note    7/16/2023    10:42 AM    Name:   Melanie Jordan  MRN:     [id number]     Acct:      [de-identified]   Room:   [address] [place] [address] Day:  3  Admit Date:  7/13/2023 10:02 AM    PCP:   Allan Bustillo MD  Code Status:  Full Code    Subjective:     C/C: sepsis  Interval History Status: improved. States he feels better today    No specific complaints other than chronic sob    Brief History: This 59-year-old gentleman was brought in from home for multiple issues. He tells me that he possibly had some rectal bleeding but there is no other evidence of this. He has a chronic indwelling Clemons and appears to have a UTI. He also has severely low sodium at 117. Just recently got released from a nursing facility and is at home apparently unable to take care of himself as he now has multiple excoriations and wounds on legs and back and buttocks.     Review of Systems:     Constitutional:  negative for chills, fevers, sweats  Respiratory:  negative for cough, wheezing  Cardiovascular:

## 2023-07-17 VITALS
SYSTOLIC BLOOD PRESSURE: 135 MMHG | OXYGEN SATURATION: 88 % | RESPIRATION RATE: 21 BRPM | WEIGHT: 315 LBS | HEIGHT: 71 IN | DIASTOLIC BLOOD PRESSURE: 74 MMHG | TEMPERATURE: 96.8 F | HEART RATE: 100 BPM | BODY MASS INDEX: 44.1 KG/M2

## 2023-07-17 LAB
ANION GAP SERPL CALCULATED.3IONS-SCNC: 8 MMOL/L (ref 9–17)
BUN SERPL-MCNC: 21 MG/DL (ref 6–20)
BUN/CREAT SERPL: 23 (ref 9–20)
CALCIUM SERPL-MCNC: 9.8 MG/DL (ref 8.6–10.4)
CHLORIDE SERPL-SCNC: 90 MMOL/L (ref 98–107)
CO2 SERPL-SCNC: 38 MMOL/L (ref 20–31)
CREAT SERPL-MCNC: 0.9 MG/DL (ref 0.7–1.2)
EKG Q-T INTERVAL: 382 MS
EKG QRS DURATION: 158 MS
EKG QTC CALCULATION (BAZETT): 477 MS
EKG R AXIS: -41 DEGREES
EKG T AXIS: 74 DEGREES
EKG VENTRICULAR RATE: 94 BPM
ERYTHROCYTE [DISTWIDTH] IN BLOOD BY AUTOMATED COUNT: 14.5 % (ref 11.8–14.4)
GFR SERPL CREATININE-BSD FRML MDRD: >60 ML/MIN/1.73M2
GLUCOSE SERPL-MCNC: 110 MG/DL (ref 70–99)
HCT VFR BLD AUTO: 29 % (ref 40.7–50.3)
HGB BLD-MCNC: 8.6 G/DL (ref 13–17)
MCH RBC QN AUTO: 26.5 PG (ref 25.2–33.5)
MCHC RBC AUTO-ENTMCNC: 29.7 G/DL (ref 28.4–34.8)
MCV RBC AUTO: 89.5 FL (ref 82.6–102.9)
METER GLUCOSE: 82 MG/DL (ref 75–110)
METER GLUCOSE: 98 MG/DL (ref 75–110)
NRBC BLD-RTO: 0 PER 100 WBC
PLATELET # BLD AUTO: 75 K/UL (ref 138–453)
PMV BLD AUTO: 9.9 FL (ref 8.1–13.5)
POTASSIUM SERPL-SCNC: 3.9 MMOL/L (ref 3.7–5.3)
RBC # BLD AUTO: 3.24 M/UL (ref 4.21–5.77)
SODIUM SERPL-SCNC: 136 MMOL/L (ref 135–144)
WBC OTHER # BLD: 4.7 K/UL (ref 3.5–11.3)

## 2023-07-17 PROCEDURE — 6370000000 HC RX 637 (ALT 250 FOR IP): Performed by: NURSE PRACTITIONER

## 2023-07-17 PROCEDURE — 2580000003 HC RX 258: Performed by: INTERNAL MEDICINE

## 2023-07-17 PROCEDURE — 85027 COMPLETE CBC AUTOMATED: CPT

## 2023-07-17 PROCEDURE — 82947 ASSAY GLUCOSE BLOOD QUANT: CPT

## 2023-07-17 PROCEDURE — 36415 COLL VENOUS BLD VENIPUNCTURE: CPT

## 2023-07-17 PROCEDURE — 97112 NEUROMUSCULAR REEDUCATION: CPT

## 2023-07-17 PROCEDURE — 97535 SELF CARE MNGMENT TRAINING: CPT

## 2023-07-17 PROCEDURE — 99239 HOSP IP/OBS DSCHRG MGMT >30: CPT | Performed by: INTERNAL MEDICINE

## 2023-07-17 PROCEDURE — 97530 THERAPEUTIC ACTIVITIES: CPT

## 2023-07-17 PROCEDURE — 80048 BASIC METABOLIC PNL TOTAL CA: CPT

## 2023-07-17 PROCEDURE — 6370000000 HC RX 637 (ALT 250 FOR IP): Performed by: INTERNAL MEDICINE

## 2023-07-17 PROCEDURE — 6360000002 HC RX W HCPCS: Performed by: INTERNAL MEDICINE

## 2023-07-17 RX ORDER — CLONAZEPAM 0.5 MG/1
.5-1 TABLET ORAL SEE ADMIN INSTRUCTIONS
Qty: 20 TABLET | Refills: 0 | Status: SHIPPED | OUTPATIENT
Start: 2023-07-17 | End: 2023-08-16

## 2023-07-17 RX ORDER — OXYCODONE AND ACETAMINOPHEN 7.5; 325 MG/1; MG/1
1 TABLET ORAL EVERY 6 HOURS PRN
Qty: 10 TABLET | Refills: 0 | Status: SHIPPED | OUTPATIENT
Start: 2023-07-17 | End: 2023-08-16

## 2023-07-17 RX ORDER — BUMETANIDE 1 MG/1
1 TABLET ORAL DAILY
Qty: 30 TABLET | Refills: 3 | DISCHARGE
Start: 2023-07-18

## 2023-07-17 RX ADMIN — PANTOPRAZOLE SODIUM 40 MG: 40 TABLET, DELAYED RELEASE ORAL at 09:34

## 2023-07-17 RX ADMIN — HYDROXYZINE HYDROCHLORIDE 25 MG: 25 TABLET, FILM COATED ORAL at 09:30

## 2023-07-17 RX ADMIN — CLONAZEPAM 0.5 MG: 0.5 TABLET ORAL at 11:57

## 2023-07-17 RX ADMIN — CLOBETASOL PROPIONATE: 0.5 OINTMENT TOPICAL at 09:36

## 2023-07-17 RX ADMIN — TROSPIUM CHLORIDE 20 MG: 20 TABLET, FILM COATED ORAL at 05:51

## 2023-07-17 RX ADMIN — MORPHINE SULFATE 2 MG: 2 INJECTION, SOLUTION INTRAMUSCULAR; INTRAVENOUS at 09:29

## 2023-07-17 RX ADMIN — MIDODRINE HYDROCHLORIDE 10 MG: 10 TABLET ORAL at 09:35

## 2023-07-17 RX ADMIN — MORPHINE SULFATE 2 MG: 2 INJECTION, SOLUTION INTRAMUSCULAR; INTRAVENOUS at 04:33

## 2023-07-17 RX ADMIN — Medication 10 ML: at 09:30

## 2023-07-17 RX ADMIN — BUMETANIDE 1 MG: 1 TABLET ORAL at 09:30

## 2023-07-17 RX ADMIN — LEVOTHYROXINE SODIUM 200 MCG: 200 TABLET ORAL at 05:51

## 2023-07-17 RX ADMIN — AMIODARONE HYDROCHLORIDE 200 MG: 200 TABLET ORAL at 09:30

## 2023-07-17 RX ADMIN — OXYCODONE AND ACETAMINOPHEN 1.5 TABLET: 5; 325 TABLET ORAL at 11:57

## 2023-07-17 RX ADMIN — MORPHINE SULFATE 2 MG: 2 INJECTION, SOLUTION INTRAMUSCULAR; INTRAVENOUS at 13:41

## 2023-07-17 RX ADMIN — MIDODRINE HYDROCHLORIDE 10 MG: 10 TABLET ORAL at 11:57

## 2023-07-17 RX ADMIN — ENOXAPARIN SODIUM 40 MG: 100 INJECTION SUBCUTANEOUS at 09:29

## 2023-07-17 RX ADMIN — CLONAZEPAM 1 MG: 0.5 TABLET ORAL at 09:30

## 2023-07-17 RX ADMIN — METOPROLOL TARTRATE 50 MG: 50 TABLET, FILM COATED ORAL at 09:30

## 2023-07-17 ASSESSMENT — PAIN DESCRIPTION - LOCATION
LOCATION: GENERALIZED

## 2023-07-17 ASSESSMENT — PAIN SCALES - GENERAL
PAINLEVEL_OUTOF10: 10
PAINLEVEL_OUTOF10: 9
PAINLEVEL_OUTOF10: 10
PAINLEVEL_OUTOF10: 9
PAINLEVEL_OUTOF10: 9
PAINLEVEL_OUTOF10: 10

## 2023-07-17 ASSESSMENT — PAIN DESCRIPTION - DESCRIPTORS
DESCRIPTORS: ACHING

## 2023-07-17 NOTE — DISCHARGE SUMMARY
[facility]  Office: [address], DO, Sanford Jiménez, DO, Silver Osorio, DO, Gloria Anderson, DO, Shell Camp MD, Valente Favre, MD, Karthikeyan Lynch MD, Blue Cadena MD,  Ron Tsai MD, Alexander Villanueva MD, Lucy Echevarria DO, Paulina Pichardo MD,  Kaye Owusu MD, Amanda Leal MD, Merline Dumont DO, Gabriele Rao MD,  Robyn Rodriguez DO, Cristobal Cuba MD, Israel Gutierrez MD, Bard Bridger MD, Nafisa Hoang MD,  Alvarez Perea MD, Regine Whitmore MD, Pk Bingham DO, Flavio Bay MD,  Karrie Jacobo MD, Claudette Means, CNP,  Isaias Gunter, CNP, Sheba Parsons, CNP, Dawood Cid, CNP,  Chelsea Arango, [facility], Sulma Ferrera, CNP, Tena Alpers, CNP, Evita Esposito, CNP, Elizabeth Murray, [facility], [facility], [facility], Wilmer Martinez PA-C, Luba Siu, CNS, Javi Villanueva, [facility], Yobani Boyce, [address] [facility]    Discharge Summary     Patient ID: Latasha Watson  :  1965   MRN: [id number]     ACCOUNT:  [de-identified]   Patient's PCP: Janet Fleming MD  Admit Date: 2023   Discharge Date: 2023     Length of Stay: 4  Code Status:  Full Code  Admitting Physician: Lay Anderson DO  Discharge Physician: Lay Anderson DO     Active Discharge Diagnoses:     Hospital Problem Lists:  Principal Problem:    Hyponatremia  Active Problems:    Alcoholic cirrhosis of liver ([facility]), sober since     Sepsis due to Proteus species (HCC)    Bipolar disorder ([address])    Type 2 diabetes mellitus with diabetic neuropathy, with long-term current use of insulin (HCC)    FABI (obstructive sleep apnea)    Acquired hypothyroidism    Portal hypertension (HCC)    Obesity, morbid, BMI 50 or higher ([facility])    Venous stasis dermatitis of both lower extremities    Chronic indwelling Clemons catheter    Chronic diastolic congestive heart failure ([address])    Primary hypertension    Chronic respiratory failure with

## 2023-07-17 NOTE — CARE COORDINATION
Social work: Jarvis bed in place for pt at [facility] where pt is from. Will do hens. Will fax jennifer to snf. Reort [phone]  Fax; [phone]  Yoni carlisle    Social work: [facility] can transport at [employer] -3 pm today to [facility]. Snf aware of time. RN to call report to above number!   Yoni carlisle

## 2023-07-17 NOTE — PROGRESS NOTES
Physical Therapy  Facility/Department: [facility]  Daily Treatment Note  NAME: Marcia Kelly  : 1965  MRN: [id number]    Date of Service: 2023    Discharge Recommendations:  Patient would benefit from continued therapy after discharge      Pt currently functioning below baseline. Recommend daily inpatient skilled therapy at time of discharge to maximize long term outcomes and prevent re-admission. Please refer to AM-PAC score for current level of function. Patient Diagnosis(es): The primary encounter diagnosis was General weakness. Diagnoses of Urinary tract infection associated with indwelling urethral catheter, initial encounter ([address]), Bilateral lower leg cellulitis, Bipolar disorder, in partial remission, most recent episode depressed ([address]), and Chronic pain syndrome were also pertinent to this visit. Assessment   Assessment: Patient sat EOB for about 5 mins required Max x 1-2 A for seated balance and Max x2-3 A for bed mobility. Constant VCs for participation and education about importance of mobility to perserve skin intergrity. Patient will required continued skilled PT services to focus on progressing bed mobility, safety and progress to standing. Activity Tolerance: Patient limited by fatigue;Patient limited by pain; Patient limited by endurance;Treatment limited secondary to decreased cognition   AM-PAC Score: 8  Plan    Physcial Therapy Plan  General Plan: 3-5 times per week  Specific Instructions for Next Treatment: continue to work on bed mobility  Current Treatment Recommendations: Strengthening;ROM;Balance training;Transfer training;Positioning; Endurance training; Safety education & training; Therapeutic activities; Patient/Caregiver education & training     Restrictions  Restrictions/Precautions  Restrictions/Precautions: General Precautions, Up as Tolerated  Required Braces or Orthoses?: No  Position Activity Restriction  Other position/activity restrictions: 3L O2 priscilla MARTIN

## 2023-07-17 NOTE — PROGRESS NOTES
for all  Orientation  Overall Orientation Status: Within Functional Limits  Orientation Level: Oriented to time;Oriented to place;Oriented to person;Oriented to situation         ADL  Toileting  Assistance Level: Dependent  Skilled Clinical Factors: wells in place and no BM needs at this time. Bed Mobility  Overall Assistance Level: Requires x 2 Assistance;Maximum Assistance (Required Max A x2-3 for all bed mobility and was able to maintain sitting EOB for ~ 5 min with Max A x2 for seated balance. Pt able to use LUE on bed rail and RUE into bed to assist.)  Additional Factors: Head of bed raised; With handrails; Increased time to complete;Verbal cues; Head of bed flat  Roll Left  Assistance Level: Requires x 2 assistance;Maximum assistance  Skilled Clinical Factors: Completed rolling R and L for adjusting pillows and wedges. At end of session pt rolled to R side to place wedge and x2 pillows underneath to offload posterior side to preserve skin integrity. Roll Right  Assistance Level: Maximum assistance; Requires x 2 assistance  Sit to Supine  Assistance Level: Maximum assistance; Requires x 2 assistance  Supine to Sit  Assistance Level: Maximum assistance; Requires x 2 assistance  Scooting  Assistance Level: Requires x 2 assistance;Dependent;Maximum assistance  Skilled Clinical Factors: Max A x2 for scooting fully to EOB and required skylift for scooting up higher in bed. Assessment  Assessment  Assessment: Pt with poor tolerance to treatment d/t severe pain throughout body and poor motivation. Pt was able to increase seated tolerance from 2 min to 5 min this session needing Max A x2 for sitting balance. Required Max A x2-3 for all bed mobility. Skilled OT warranted to promote I/safety to return pt to prior living arrangement with assist as needed. Activity Tolerance: Patient limited by fatigue;Patient limited by pain; Patient limited by endurance;Treatment limited secondary to decreased cognition  Discharge Recommendations: Patient would benefit from continued therapy after discharge  Safety Devices  Safety Devices in place: Yes  Type of devices: Nurse notified; Left in bed;Call light within reach; All fall risk precautions in place    Patient Education  Education  Education Given To: Patient  Education Provided: Role of Therapy;Cognition; Safety;Precautions; Mobility Training  Education Method: Verbal;Demonstration  Barriers to Learning: Cognition  Education Outcome: Continued education needed    Plan  Occupational Therapy Plan  Times Per Week: 3-4x per week  Current Treatment Recommendations: Strengthening;Balance training;Functional mobility training; Endurance training;Patient/Caregiver education & training; Safety education & training;Equipment evaluation, education, & procurement;Self-Care / ADL;Cognitive/Perceptual training;Positioning    Goals  Patient Goals   Patient goals : to stay in bed  Short Term Goals  Time Frame for Short Term Goals: by discharge,  Short Term Goal 1: pt to tolerate re-assessment of ADL transfers and functional mob to add goals to POC, if appropriate  Short Term Goal 2: pt to demo Mod A for bed mob tech with bed rails/controls as needed  Short Term Goal 3: pt to tolerate sitting EOB ~6+mins, SBA sitting balance, while completing self-care tasks  Short Term Goal 4: pt to demo Min A for UB ADLs and Mod A for LB ADLs with AE As needed and Good safety  Short Term Goal 5: pt/family to be IND with EC/WS, fall prevention tech, disease specific education, pressure relief education, discharge recommendations, with use of handouts as needed    AM-PAC Score        AM-PAC Inpatient Daily Activity Raw Score: 14 (07/17/23 1439)  AM-PAC Inpatient ADL T-Scale Score : 33.39 (07/17/23 1439)  ADL Inpatient CMS 0-100% Score: 59.67 (07/17/23 1439)  ADL Inpatient CMS G-Code Modifier : CK (07/17/23 1439)      Therapy Time   Individual Concurrent Group Co-treatment   Time In 1339         Time Out

## 2023-07-17 NOTE — PROGRESS NOTES
[facility]  Office: [phone]  Ana Rosa Carter, DO, Eulalia Velazquez, DO, Yeimy Calix, DO, Jeanmarie Anderson, DO, Katiuska Silveira MD, Selam Arevalo MD, Desmond Rush MD, Junior English MD,  Paulette Billy MD, Jac Cleaning MD, Enrique Sherman DO, Janette Rubio MD,  Glenard Duverney, MD, Ney Salvador MD, Stephanie Méndez DO, Kofi Aguiar MD,  Jilda Collet, DO, Jason Curtis MD, Angelica Rios MD, Alejandrina Payne MD, Braxton Walls MD,  Luis Ott MD, Kasandra Bernard MD, Gabrielle Walker DO, Corby Thompson MD,  Gunjan Naik MD, Mahesh Nair, Geremias Bloom, CNP, Charles Posada, CNP, Pancho Justice CNP,  Louisa Zhang, [facility], Sanjay Lobo, CNP, Saman Briones, CNP, Joan Hardin, CNP, Larissa Iqbal CNP, Jensen Christianson, CNP, Stacy Martinez PA-C, Mara Mendoza, [facility], Geoffrey Farley CNP, Humaira Martinez, [address] [facility]    Progress Note    7/17/2023    10:41 AM    Name:   Brenda Melgoza  MRN:     [id number]     Acct:      [de-identified]   Room:   [address] [place] [address] Day:  4  Admit Date:  7/13/2023 10:02 AM    PCP:   Onur Woodruff MD  Code Status:  Full Code    Subjective:     C/C: sepsis  Interval History Status: improved. States he feels better today    No specific complaints other than chronic sob    He wants to make sure the f has a bariatric bed and they do good wells care    Brief History: This 62-year-old gentleman was brought in from home for multiple issues. He tells me that he possibly had some rectal bleeding but there is no other evidence of this. He has a chronic indwelling Wells and appears to have a UTI. He also has severely low sodium at 117. Just recently got released from a nursing facility and is at home apparently unable to take care of himself as he now has multiple excoriations and wounds on legs and back and buttocks.     Review of Systems:     Constitutional:  negative for

## 2023-07-17 NOTE — PLAN OF CARE
Problem: Skin/Tissue Integrity  Goal: Absence of new skin breakdown  Description: 1. Monitor for areas of redness and/or skin breakdown  2. Assess vascular access sites hourly  3. Every 4-6 hours minimum:  Change oxygen saturation probe site  4. Every 4-6 hours:  If on nasal continuous positive airway pressure, respiratory therapy assess nares and determine need for appliance change or resting period.   Outcome: Progressing     Problem: Discharge Planning  Goal: Discharge to home or other facility with appropriate resources  Outcome: Progressing  Flowsheets (Taken 7/17/2023 0400)  Discharge to home or other facility with appropriate resources: Identify barriers to discharge with patient and caregiver     Problem: Pain  Goal: Verbalizes/displays adequate comfort level or baseline comfort level  Outcome: Progressing     Problem: Safety - Adult  Goal: Free from fall injury  Outcome: Progressing     Problem: Chronic Conditions and Co-morbidities  Goal: Patient's chronic conditions and co-morbidity symptoms are monitored and maintained or improved  Outcome: Progressing  Flowsheets (Taken 7/17/2023 0400)  Care Plan - Patient's Chronic Conditions and Co-Morbidity Symptoms are Monitored and Maintained or Improved: Monitor and assess patient's chronic conditions and comorbid symptoms for stability, deterioration, or improvement     Problem: Neurosensory - Adult  Goal: Achieves stable or improved neurological status  Outcome: Progressing  Flowsheets (Taken 7/17/2023 0400)  Achieves stable or improved neurological status:   Assess for and report changes in neurological status   Initiate measures to prevent increased intracranial pressure     Problem: Neurosensory - Adult  Goal: Achieves maximal functionality and self care  Outcome: Progressing  Flowsheets (Taken 7/17/2023 0400)  Achieves maximal functionality and self care: Monitor swallowing and airway patency with patient fatigue and changes in neurological status

## 2023-07-17 NOTE — PROGRESS NOTES
Pt transferring to [facility]. Pts questions answered and plan reviewed with him. PICC placed for IV rocephin. Transport picked pt up with all belongings. Report attempted to be called [phone], on hold with no answer. Voicemail left, awaiting call back. Writer called back again, report was given to satisfaction and prescriptions faxed.

## 2023-07-18 ENCOUNTER — HOSPITAL ENCOUNTER (OUTPATIENT)
Dept: INTERVENTIONAL RADIOLOGY/VASCULAR | Age: 58
Discharge: HOME | End: 2023-07-20
Payer: MEDICARE

## 2023-07-18 PROCEDURE — 36410 VNPNXR 3YR/> PHY/QHP DX/THER: CPT

## 2023-07-18 PROCEDURE — C1751 CATH, INF, PER/CENT/MIDLINE: HCPCS

## 2023-07-18 PROCEDURE — 76937 US GUIDE VASCULAR ACCESS: CPT

## 2023-07-19 LAB
COLLECT DURATION TIME SPEC: 1400 H
CORTISOL (UR), FREE: NORMAL UG/D
CORTISOL URINE, FREE (/G CRT): 4.1 UG/L
CORTISOL, URINE RATIO CREATININE: 12.42 UG/G CRT
CORTISOL, URINE: NORMAL
CREAT 24H UR-MCNC: 33 MG/DL
CREATININE URINE /24 HR: NORMAL MG/D (ref 800–2100)
SPECIMEN VOL ?TM UR: NORMAL ML
VASOPRESSIN SERPL-MCNC: <0.5 PG/ML (ref 0–6.9)

## 2023-07-22 ENCOUNTER — APPOINTMENT (OUTPATIENT)
Dept: CT IMAGING | Age: 58
End: 2023-07-22
Payer: MEDICARE

## 2023-07-22 ENCOUNTER — HOSPITAL ENCOUNTER (EMERGENCY)
Age: 58
Discharge: OTHER INSTITUTION | End: 2023-07-22
Attending: EMERGENCY MEDICINE
Payer: MEDICARE

## 2023-07-22 VITALS
HEART RATE: 74 BPM | SYSTOLIC BLOOD PRESSURE: 127 MMHG | BODY MASS INDEX: 44.1 KG/M2 | RESPIRATION RATE: 18 BRPM | WEIGHT: 315 LBS | OXYGEN SATURATION: 97 % | DIASTOLIC BLOOD PRESSURE: 66 MMHG | HEIGHT: 71 IN | TEMPERATURE: 98.3 F

## 2023-07-22 DIAGNOSIS — R23.8 SKIN BREAKDOWN: Primary | ICD-10-CM

## 2023-07-22 LAB
ALBUMIN SERPL-MCNC: 2.6 G/DL (ref 3.5–5.2)
ALBUMIN/GLOB SERPL: 0.6 {RATIO} (ref 1–2.5)
ALP SERPL-CCNC: 494 U/L (ref 40–129)
ALT SERPL-CCNC: 47 U/L (ref 5–41)
AMORPH SED URNS QL MICRO: ABNORMAL
AMYLASE SERPL-CCNC: 35 U/L (ref 28–100)
ANION GAP SERPL CALCULATED.3IONS-SCNC: 4 MMOL/L (ref 9–17)
AST SERPL-CCNC: 34 U/L
BACTERIA URNS QL MICRO: ABNORMAL
BASOPHILS # BLD: 0 K/UL (ref 0–0.2)
BASOPHILS NFR BLD: 0 % (ref 0–2)
BILIRUB DIRECT SERPL-MCNC: 0.4 MG/DL
BILIRUB INDIRECT SERPL-MCNC: 0.3 MG/DL (ref 0–1)
BILIRUB SERPL-MCNC: 0.7 MG/DL (ref 0.3–1.2)
BILIRUB UR QL STRIP: NEGATIVE
BUN SERPL-MCNC: 11 MG/DL (ref 6–20)
CALCIUM SERPL-MCNC: 9.3 MG/DL (ref 8.6–10.4)
CHARACTER UR: ABNORMAL
CHLORIDE SERPL-SCNC: 87 MMOL/L (ref 98–107)
CLARITY UR: CLEAR
CO2 SERPL-SCNC: 42 MMOL/L (ref 20–31)
COLOR UR: YELLOW
CREAT SERPL-MCNC: 0.9 MG/DL (ref 0.7–1.2)
EOSINOPHIL # BLD: 0.19 K/UL (ref 0–0.4)
EOSINOPHILS RELATIVE PERCENT: 2 % (ref 1–4)
EPI CELLS #/AREA URNS HPF: ABNORMAL /HPF (ref 0–5)
ERYTHROCYTE [DISTWIDTH] IN BLOOD BY AUTOMATED COUNT: 16 % (ref 12.5–15.4)
GFR SERPL CREATININE-BSD FRML MDRD: >60 ML/MIN/1.73M2
GLUCOSE SERPL-MCNC: 154 MG/DL (ref 70–99)
GLUCOSE UR STRIP-MCNC: ABNORMAL MG/DL
HCT VFR BLD AUTO: 29.1 % (ref 41–53)
HCT VFR BLD AUTO: 29.6 % (ref 41–53)
HGB BLD-MCNC: 9.2 G/DL (ref 13.5–17.5)
HGB BLD-MCNC: 9.4 G/DL (ref 13.5–17.5)
HGB UR QL STRIP.AUTO: ABNORMAL
KETONES UR STRIP-MCNC: NEGATIVE MG/DL
LACTATE BLDV-SCNC: 0.8 MMOL/L (ref 0.5–1.9)
LEUKOCYTE ESTERASE UR QL STRIP: ABNORMAL
LIPASE SERPL-CCNC: 20 U/L (ref 13–60)
LYMPHOCYTES NFR BLD: 0.48 K/UL (ref 1–4.8)
LYMPHOCYTES RELATIVE PERCENT: 5 % (ref 24–44)
MCH RBC QN AUTO: 27 PG (ref 26–34)
MCHC RBC AUTO-ENTMCNC: 31.8 G/DL (ref 31–37)
MCV RBC AUTO: 85 FL (ref 80–100)
MONOCYTES NFR BLD: 0.67 K/UL (ref 0.1–0.8)
MONOCYTES NFR BLD: 7 % (ref 1–7)
MORPHOLOGY: NORMAL
NEUTROPHILS NFR BLD: 86 % (ref 36–66)
NEUTS SEG NFR BLD: 8.26 K/UL (ref 1.8–7.7)
NITRITE UR QL STRIP: NEGATIVE
PH UR STRIP: 7 [PH] (ref 5–8)
PLATELET # BLD AUTO: 174 K/UL (ref 140–450)
PMV BLD AUTO: 7.2 FL (ref 6–12)
POTASSIUM SERPL-SCNC: 4.5 MMOL/L (ref 3.7–5.3)
PROT SERPL-MCNC: 6.7 G/DL (ref 6.4–8.3)
PROT UR STRIP-MCNC: NEGATIVE MG/DL
RBC # BLD AUTO: 3.48 M/UL (ref 4.5–5.9)
RBC #/AREA URNS HPF: ABNORMAL /HPF (ref 0–2)
SODIUM SERPL-SCNC: 133 MMOL/L (ref 135–144)
SP GR UR STRIP: 1.01 (ref 1–1.03)
UROBILINOGEN UR STRIP-ACNC: NORMAL EU/DL (ref 0–1)
WBC #/AREA URNS HPF: ABNORMAL /HPF (ref 0–5)
WBC OTHER # BLD: 9.6 K/UL (ref 3.5–11)

## 2023-07-22 PROCEDURE — 36415 COLL VENOUS BLD VENIPUNCTURE: CPT

## 2023-07-22 PROCEDURE — 80076 HEPATIC FUNCTION PANEL: CPT

## 2023-07-22 PROCEDURE — 83690 ASSAY OF LIPASE: CPT

## 2023-07-22 PROCEDURE — 85027 COMPLETE CBC AUTOMATED: CPT

## 2023-07-22 PROCEDURE — 6360000002 HC RX W HCPCS: Performed by: EMERGENCY MEDICINE

## 2023-07-22 PROCEDURE — 81001 URINALYSIS AUTO W/SCOPE: CPT

## 2023-07-22 PROCEDURE — 80048 BASIC METABOLIC PNL TOTAL CA: CPT

## 2023-07-22 PROCEDURE — 96375 TX/PRO/DX INJ NEW DRUG ADDON: CPT | Performed by: EMERGENCY MEDICINE

## 2023-07-22 PROCEDURE — 96376 TX/PRO/DX INJ SAME DRUG ADON: CPT | Performed by: EMERGENCY MEDICINE

## 2023-07-22 PROCEDURE — 85018 HEMOGLOBIN: CPT

## 2023-07-22 PROCEDURE — 83605 ASSAY OF LACTIC ACID: CPT

## 2023-07-22 PROCEDURE — 99284 EMERGENCY DEPT VISIT MOD MDM: CPT | Performed by: EMERGENCY MEDICINE

## 2023-07-22 PROCEDURE — 85014 HEMATOCRIT: CPT

## 2023-07-22 PROCEDURE — 96374 THER/PROPH/DIAG INJ IV PUSH: CPT | Performed by: EMERGENCY MEDICINE

## 2023-07-22 PROCEDURE — 82150 ASSAY OF AMYLASE: CPT

## 2023-07-22 RX ORDER — SODIUM CHLORIDE 0.9 % (FLUSH) 0.9 %
10 SYRINGE (ML) INJECTION ONCE
Status: DISCONTINUED | OUTPATIENT
Start: 2023-07-22 | End: 2023-07-22

## 2023-07-22 RX ORDER — MORPHINE SULFATE 4 MG/ML
4 INJECTION, SOLUTION INTRAMUSCULAR; INTRAVENOUS ONCE
Status: COMPLETED | OUTPATIENT
Start: 2023-07-22 | End: 2023-07-22

## 2023-07-22 RX ORDER — 0.9 % SODIUM CHLORIDE 0.9 %
80 INTRAVENOUS SOLUTION INTRAVENOUS ONCE
Status: DISCONTINUED | OUTPATIENT
Start: 2023-07-22 | End: 2023-07-22

## 2023-07-22 RX ADMIN — MORPHINE SULFATE 4 MG: 4 INJECTION, SOLUTION INTRAMUSCULAR; INTRAVENOUS at 11:46

## 2023-07-22 RX ADMIN — HYDROMORPHONE HYDROCHLORIDE 1 MG: 1 INJECTION, SOLUTION INTRAMUSCULAR; INTRAVENOUS; SUBCUTANEOUS at 14:09

## 2023-07-22 RX ADMIN — HYDROMORPHONE HYDROCHLORIDE 1 MG: 1 INJECTION, SOLUTION INTRAMUSCULAR; INTRAVENOUS; SUBCUTANEOUS at 13:10

## 2023-07-22 ASSESSMENT — PAIN SCALES - GENERAL: PAINLEVEL_OUTOF10: 7

## 2023-07-22 ASSESSMENT — PAIN - FUNCTIONAL ASSESSMENT: PAIN_FUNCTIONAL_ASSESSMENT: 0-10

## 2023-07-22 NOTE — DISCHARGE INSTRUCTIONS
Barrier products and needed  Return immediately if any worsening symptoms or any other concerns    Tell us how we did visit: [email or web address]   and let us know about your experience

## 2023-07-22 NOTE — ED PROVIDER NOTES
Cirrhosis ([address]), Diabetes mellitus ([address]), Epididymoorchitis, GERD (gastroesophageal reflux disease), H/O cardiac catheterization, Hepatitis, Hiatal hernia, History of alcohol abuse, Hyperlipidemia, Hypertension, IBS (irritable bowel syndrome), Incisional hernia with obstruction but no gangrene, Klebsiella sepsis ([address]), Metabolic encephalopathy, Morbid obesity ([address]), Neuropathy, On home oxygen therapy, On home oxygen therapy, Pancreatitis, Poisoning by insulin and oral hypoglycemic (antidiabetic) drugs, accidental (unintentional), initial encounter, Primary osteoarthritis of right knee, Retention, urine, SBO (small bowel obstruction) ([address]), Secondary hypercoagulable state ([address]), Septic shock ([address]), Sleep apnea, Sleep apnea, obstructive, Thyroid disease, Under care of team, Urinary bladder neurogenic dysfunction, and Urinary tract infection associated with indwelling urethral catheter ([address]). SURGICAL HISTORY      has a past surgical history that includes Colonoscopy; Abdomen surgery; hernia repair; Endoscopy, colon, diagnostic; Upper gastrointestinal endoscopy (07/19/2017); pr egd transoral biopsy single/multiple (N/A, 07/19/2017); pr i&d below fascia foot 1 bursal space (Bilateral, 08/22/2017); Cystoscopy (01/24/2018); pr cystourethroscopy (N/A, 01/24/2018); Incisional hernia repair (05/20/2018); ventral hernia repair (N/A, 05/20/2018); Cystocopy (02/20/2019); Cystoscopy (N/A, 02/20/2019); Upper gastrointestinal endoscopy (N/A, 03/14/2019); Cystoscopy (N/A, 08/23/2019); Cystoscopy (N/A, 06/08/2022); Upper gastrointestinal endoscopy (N/A, 04/18/2023); Colonoscopy (N/A, 04/19/2023); Colonoscopy (04/20/2023); and Colonoscopy (N/A, 4/20/2023).     CURRENT MEDICATIONS       Previous Medications    ALBUTEROL SULFATE HFA (VENTOLIN HFA) 108 (90 BASE) MCG/ACT INHALER    Inhale 2 puffs into the lungs every 6 hours as needed for Wheezing    AMIODARONE (CORDARONE) 200 MG TABLET    Take 1 tablet by mouth 2 times daily occasionally words are mis-transcribed. Additionally, portions of this note may also include information that was incorporated after care transfer to another provider that were not available at the time of my evaluation.   Some of this information could likely include laboratory values, vital sign updates, medications etc.)    Mirna Da Silva DO   Attending Emergency Physician      Mirna Da Silva DO  07/22/23 1501

## 2023-07-26 NOTE — PROGRESS NOTES
Physician Progress Note      PATIENT:               Hazel Vasquez  [facility] #:                  [id number]  :                       1965  ADMIT DATE:       2023 10:02 AM  [address] DATE:        2023 4:13 PM  RESPONDING  PROVIDER #:        Annabel Anderson DO          QUERY TEXT:    Patient admitted with UTI. Noted documentation of sepsis in progress note on   . In order to support the diagnosis of sepsis, please include additional   clinical indicators in your documentation. Or please document if the   diagnosis of sepsis has been ruled out after further study    The medical record reflects the following:  Risk Factors: UTI  Clinical Indicators: Wbc of 15.8, no LA, no T, no HR, no RR, culture grew   proteus species  Treatment: IVF, labs, IV Rocephin, IV Vano    Thank you,  Aleisha Naik RN  Options provided:  -- Sepsis present as evidenced by, Please document evidence. -- Sepsis was ruled out after study  -- Other - I will add my own diagnosis  -- Disagree - Not applicable / Not valid  -- Disagree - Clinically unable to determine / Unknown  -- Refer to Clinical Documentation Reviewer    PROVIDER RESPONSE TEXT:    Sepsis is present as evidenced by positive blood cultures, high wbc and   multiple metabolic derangements    Query created by:  Isabela De La Torre on 2023 4:26 PM      Electronically signed by:  Annabel Anderson DO 2023 12:04 PM

## 2023-08-21 ENCOUNTER — HOSPITAL ENCOUNTER (INPATIENT)
Age: 58
LOS: 3 days | Discharge: NURSING, REHAB OR LONG-TERM CARE | DRG: 641 | End: 2023-08-24
Attending: EMERGENCY MEDICINE | Admitting: STUDENT IN AN ORGANIZED HEALTH CARE EDUCATION/TRAINING PROGRAM
Payer: MEDICARE

## 2023-08-21 DIAGNOSIS — E87.1 HYPONATREMIA: ICD-10-CM

## 2023-08-21 DIAGNOSIS — E87.5 HYPERKALEMIA: ICD-10-CM

## 2023-08-21 DIAGNOSIS — N17.9 AKI (ACUTE KIDNEY INJURY) (HCC): Primary | ICD-10-CM

## 2023-08-21 LAB
ANION GAP SERPL CALCULATED.3IONS-SCNC: 4 MMOL/L (ref 9–17)
BUN SERPL-MCNC: 45 MG/DL (ref 6–20)
CALCIUM SERPL-MCNC: 8.8 MG/DL (ref 8.6–10.4)
CHLORIDE SERPL-SCNC: 86 MMOL/L (ref 98–107)
CO2 SERPL-SCNC: 33 MMOL/L (ref 20–31)
CREAT SERPL-MCNC: 1.8 MG/DL (ref 0.7–1.2)
GFR SERPL CREATININE-BSD FRML MDRD: 43 ML/MIN/1.73M2
GLUCOSE BLD-MCNC: 103 MG/DL (ref 75–110)
GLUCOSE SERPL-MCNC: 87 MG/DL (ref 70–99)
POTASSIUM SERPL-SCNC: 6.6 MMOL/L (ref 3.7–5.3)
REASON FOR REJECTION: NORMAL
SODIUM SERPL-SCNC: 123 MMOL/L (ref 135–144)
SPECIMEN SOURCE: NORMAL
ZZ NTE CLEAN UP: ORDERED TEST: NORMAL

## 2023-08-21 PROCEDURE — 2000000000 HC ICU R&B

## 2023-08-21 PROCEDURE — 80048 BASIC METABOLIC PNL TOTAL CA: CPT

## 2023-08-21 PROCEDURE — 6370000000 HC RX 637 (ALT 250 FOR IP): Performed by: EMERGENCY MEDICINE

## 2023-08-21 PROCEDURE — 99285 EMERGENCY DEPT VISIT HI MDM: CPT

## 2023-08-21 PROCEDURE — 96374 THER/PROPH/DIAG INJ IV PUSH: CPT

## 2023-08-21 PROCEDURE — 36415 COLL VENOUS BLD VENIPUNCTURE: CPT

## 2023-08-21 PROCEDURE — 2580000003 HC RX 258: Performed by: EMERGENCY MEDICINE

## 2023-08-21 PROCEDURE — 82947 ASSAY GLUCOSE BLOOD QUANT: CPT

## 2023-08-21 RX ORDER — TRAZODONE HYDROCHLORIDE 50 MG/1
75 TABLET ORAL NIGHTLY
COMMUNITY

## 2023-08-21 RX ORDER — MENTHOL AND ZINC OXIDE .44; 20.625 G/100G; G/100G
OINTMENT TOPICAL DAILY
COMMUNITY

## 2023-08-21 RX ORDER — DEXTROSE MONOHYDRATE 25 G/50ML
INJECTION, SOLUTION INTRAVENOUS
Status: DISPENSED
Start: 2023-08-21 | End: 2023-08-22

## 2023-08-21 RX ORDER — SODIUM CHLORIDE 9 MG/ML
INJECTION, SOLUTION INTRAVENOUS PRN
Status: DISCONTINUED | OUTPATIENT
Start: 2023-08-21 | End: 2023-08-24 | Stop reason: HOSPADM

## 2023-08-21 RX ORDER — ACETAMINOPHEN 650 MG/1
650 SUPPOSITORY RECTAL EVERY 6 HOURS PRN
Status: DISCONTINUED | OUTPATIENT
Start: 2023-08-21 | End: 2023-08-24 | Stop reason: HOSPADM

## 2023-08-21 RX ORDER — DEXTROSE MONOHYDRATE 25 G/50ML
25 INJECTION, SOLUTION INTRAVENOUS ONCE
Status: DISCONTINUED | OUTPATIENT
Start: 2023-08-21 | End: 2023-08-24 | Stop reason: HOSPADM

## 2023-08-21 RX ORDER — ACETAMINOPHEN 325 MG/1
650 TABLET ORAL EVERY 6 HOURS PRN
COMMUNITY

## 2023-08-21 RX ORDER — OXYCODONE HYDROCHLORIDE 10 MG/1
10 TABLET ORAL EVERY 4 HOURS PRN
Status: ON HOLD | COMMUNITY
End: 2023-08-30 | Stop reason: SDUPTHER

## 2023-08-21 RX ORDER — ENOXAPARIN SODIUM 100 MG/ML
40 INJECTION SUBCUTANEOUS 2 TIMES DAILY
Status: DISCONTINUED | OUTPATIENT
Start: 2023-08-22 | End: 2023-08-24 | Stop reason: HOSPADM

## 2023-08-21 RX ORDER — SODIUM CHLORIDE 0.9 % (FLUSH) 0.9 %
5-40 SYRINGE (ML) INJECTION PRN
Status: DISCONTINUED | OUTPATIENT
Start: 2023-08-21 | End: 2023-08-24 | Stop reason: HOSPADM

## 2023-08-21 RX ORDER — SODIUM CHLORIDE 9 MG/ML
INJECTION, SOLUTION INTRAVENOUS CONTINUOUS
Status: DISCONTINUED | OUTPATIENT
Start: 2023-08-21 | End: 2023-08-22

## 2023-08-21 RX ORDER — ONDANSETRON 4 MG/1
4 TABLET, ORALLY DISINTEGRATING ORAL EVERY 8 HOURS PRN
Status: DISCONTINUED | OUTPATIENT
Start: 2023-08-21 | End: 2023-08-24 | Stop reason: HOSPADM

## 2023-08-21 RX ORDER — FLUCONAZOLE 100 MG/1
100 TABLET ORAL DAILY
Status: ON HOLD | COMMUNITY
End: 2023-08-24 | Stop reason: HOSPADM

## 2023-08-21 RX ORDER — ACETAMINOPHEN 325 MG/1
650 TABLET ORAL EVERY 6 HOURS PRN
Status: DISCONTINUED | OUTPATIENT
Start: 2023-08-21 | End: 2023-08-24 | Stop reason: HOSPADM

## 2023-08-21 RX ORDER — ONDANSETRON 2 MG/ML
4 INJECTION INTRAMUSCULAR; INTRAVENOUS EVERY 6 HOURS PRN
Status: DISCONTINUED | OUTPATIENT
Start: 2023-08-21 | End: 2023-08-24 | Stop reason: HOSPADM

## 2023-08-21 RX ORDER — CITALOPRAM 20 MG/1
20 TABLET ORAL DAILY
COMMUNITY

## 2023-08-21 RX ORDER — SODIUM POLYSTYRENE SULFONATE 15 G/60ML
15 SUSPENSION ORAL; RECTAL DAILY
Status: ON HOLD | COMMUNITY
End: 2023-08-24 | Stop reason: HOSPADM

## 2023-08-21 RX ORDER — IPRATROPIUM BROMIDE AND ALBUTEROL SULFATE 2.5; .5 MG/3ML; MG/3ML
1 SOLUTION RESPIRATORY (INHALATION) EVERY 4 HOURS
COMMUNITY

## 2023-08-21 RX ORDER — OXYBUTYNIN CHLORIDE 10 MG/1
10 TABLET, EXTENDED RELEASE ORAL DAILY
COMMUNITY

## 2023-08-21 RX ORDER — SODIUM CHLORIDE 0.9 % (FLUSH) 0.9 %
5-40 SYRINGE (ML) INJECTION EVERY 12 HOURS SCHEDULED
Status: DISCONTINUED | OUTPATIENT
Start: 2023-08-22 | End: 2023-08-24 | Stop reason: HOSPADM

## 2023-08-21 RX ADMIN — DEXTROSE MONOHYDRATE 250 ML: 100 INJECTION, SOLUTION INTRAVENOUS at 22:28

## 2023-08-21 RX ADMIN — SODIUM ZIRCONIUM CYCLOSILICATE 10 G: 10 POWDER, FOR SUSPENSION ORAL at 22:29

## 2023-08-21 RX ADMIN — INSULIN HUMAN 5 UNITS: 100 INJECTION, SOLUTION PARENTERAL at 22:33

## 2023-08-21 RX ADMIN — SODIUM CHLORIDE: 9 INJECTION, SOLUTION INTRAVENOUS at 23:42

## 2023-08-22 ENCOUNTER — APPOINTMENT (OUTPATIENT)
Dept: ULTRASOUND IMAGING | Age: 58
DRG: 641 | End: 2023-08-22
Attending: INTERNAL MEDICINE
Payer: MEDICARE

## 2023-08-22 LAB
ANION GAP SERPL CALCULATED.3IONS-SCNC: 2 MMOL/L (ref 9–17)
ANION GAP SERPL CALCULATED.3IONS-SCNC: 5 MMOL/L (ref 9–17)
ANION GAP SERPL CALCULATED.3IONS-SCNC: 5 MMOL/L (ref 9–17)
ANION GAP SERPL CALCULATED.3IONS-SCNC: 6 MMOL/L (ref 9–17)
BACTERIA URNS QL MICRO: ABNORMAL
BACTERIA URNS QL MICRO: ABNORMAL
BILIRUB UR QL STRIP: NEGATIVE
BILIRUB UR QL STRIP: NEGATIVE
BUN SERPL-MCNC: 39 MG/DL (ref 6–20)
BUN SERPL-MCNC: 42 MG/DL (ref 6–20)
BUN SERPL-MCNC: 44 MG/DL (ref 6–20)
BUN SERPL-MCNC: 44 MG/DL (ref 6–20)
CALCIUM SERPL-MCNC: 8.6 MG/DL (ref 8.6–10.4)
CALCIUM SERPL-MCNC: 8.7 MG/DL (ref 8.6–10.4)
CALCIUM SERPL-MCNC: 8.9 MG/DL (ref 8.6–10.4)
CALCIUM SERPL-MCNC: 9.3 MG/DL (ref 8.6–10.4)
CHLORIDE SERPL-SCNC: 85 MMOL/L (ref 98–107)
CHLORIDE SERPL-SCNC: 86 MMOL/L (ref 98–107)
CHLORIDE UR-SCNC: 60 MMOL/L
CLARITY UR: CLEAR
CLARITY UR: CLEAR
CO2 SERPL-SCNC: 32 MMOL/L (ref 20–31)
CO2 SERPL-SCNC: 33 MMOL/L (ref 20–31)
CO2 SERPL-SCNC: 33 MMOL/L (ref 20–31)
CO2 SERPL-SCNC: 35 MMOL/L (ref 20–31)
COLOR UR: YELLOW
COLOR UR: YELLOW
CREAT SERPL-MCNC: 1.5 MG/DL (ref 0.7–1.2)
CREAT SERPL-MCNC: 1.5 MG/DL (ref 0.7–1.2)
CREAT SERPL-MCNC: 1.6 MG/DL (ref 0.7–1.2)
CREAT SERPL-MCNC: 1.7 MG/DL (ref 0.7–1.2)
CREAT UR-MCNC: 18.6 MG/DL (ref 39–259)
EPI CELLS #/AREA URNS HPF: ABNORMAL /HPF (ref 0–5)
EPI CELLS #/AREA URNS HPF: ABNORMAL /HPF (ref 0–5)
EST. AVERAGE GLUCOSE BLD GHB EST-MCNC: 103 MG/DL
FIO2: 32
GFR SERPL CREATININE-BSD FRML MDRD: 46 ML/MIN/1.73M2
GFR SERPL CREATININE-BSD FRML MDRD: 49 ML/MIN/1.73M2
GFR SERPL CREATININE-BSD FRML MDRD: 53 ML/MIN/1.73M2
GFR SERPL CREATININE-BSD FRML MDRD: 53 ML/MIN/1.73M2
GLUCOSE BLD-MCNC: 117 MG/DL (ref 75–110)
GLUCOSE BLD-MCNC: 119 MG/DL (ref 75–110)
GLUCOSE BLD-MCNC: 119 MG/DL (ref 75–110)
GLUCOSE BLD-MCNC: 123 MG/DL (ref 75–110)
GLUCOSE BLD-MCNC: 137 MG/DL (ref 75–110)
GLUCOSE BLD-MCNC: 151 MG/DL (ref 75–110)
GLUCOSE BLD-MCNC: 152 MG/DL (ref 75–110)
GLUCOSE BLD-MCNC: 161 MG/DL (ref 75–110)
GLUCOSE BLD-MCNC: 60 MG/DL (ref 75–110)
GLUCOSE BLD-MCNC: 61 MG/DL (ref 75–110)
GLUCOSE BLD-MCNC: 78 MG/DL (ref 75–110)
GLUCOSE BLD-MCNC: 80 MG/DL (ref 75–110)
GLUCOSE BLD-MCNC: 80 MG/DL (ref 75–110)
GLUCOSE BLD-MCNC: 82 MG/DL (ref 75–110)
GLUCOSE BLD-MCNC: 83 MG/DL (ref 75–110)
GLUCOSE BLD-MCNC: 92 MG/DL (ref 75–110)
GLUCOSE BLD-MCNC: 92 MG/DL (ref 75–110)
GLUCOSE BLD-MCNC: 99 MG/DL (ref 75–110)
GLUCOSE SERPL-MCNC: 175 MG/DL (ref 70–99)
GLUCOSE SERPL-MCNC: 71 MG/DL (ref 70–99)
GLUCOSE SERPL-MCNC: 80 MG/DL (ref 70–99)
GLUCOSE SERPL-MCNC: 88 MG/DL (ref 70–99)
GLUCOSE UR STRIP-MCNC: NEGATIVE MG/DL
GLUCOSE UR STRIP-MCNC: NEGATIVE MG/DL
HBA1C MFR BLD: 5.2 % (ref 4–6)
HGB UR QL STRIP.AUTO: ABNORMAL
HGB UR QL STRIP.AUTO: ABNORMAL
INR PPP: 1
KETONES UR STRIP-MCNC: NEGATIVE MG/DL
KETONES UR STRIP-MCNC: NEGATIVE MG/DL
LEUKOCYTE ESTERASE UR QL STRIP: ABNORMAL
LEUKOCYTE ESTERASE UR QL STRIP: ABNORMAL
MAGNESIUM SERPL-MCNC: 2.5 MG/DL (ref 1.6–2.6)
NITRITE UR QL STRIP: NEGATIVE
NITRITE UR QL STRIP: NEGATIVE
OSMOLALITY SERPL: 283 MOSM/KG (ref 275–295)
OSMOLALITY UR: 226 MOSM/KG (ref 80–1300)
PH UR STRIP: 6.5 [PH] (ref 5–8)
PH UR STRIP: 6.5 [PH] (ref 5–8)
POC HCO3: 31.3 MMOL/L (ref 21–28)
POC O2 SATURATION: 93.4 % (ref 94–98)
POC PCO2: 56.1 MM HG (ref 35–48)
POC PH: 7.35 (ref 7.35–7.45)
POC PO2: 73.2 MM HG (ref 83–108)
POSITIVE BASE EXCESS, ART: 4.7 MMOL/L (ref 0–3)
POTASSIUM SERPL-SCNC: 5.3 MMOL/L (ref 3.7–5.3)
POTASSIUM SERPL-SCNC: 5.9 MMOL/L (ref 3.7–5.3)
POTASSIUM SERPL-SCNC: 6 MMOL/L (ref 3.7–5.3)
POTASSIUM SERPL-SCNC: 6.2 MMOL/L (ref 3.7–5.3)
POTASSIUM SERPL-SCNC: 6.2 MMOL/L (ref 3.7–5.3)
POTASSIUM, UR: 46.7 MMOL/L
PROT UR STRIP-MCNC: NEGATIVE MG/DL
PROT UR STRIP-MCNC: NEGATIVE MG/DL
PROTHROMBIN TIME: 10.4 SEC (ref 9.4–12.6)
RBC #/AREA URNS HPF: ABNORMAL /HPF (ref 0–2)
RBC #/AREA URNS HPF: ABNORMAL /HPF (ref 0–2)
SODIUM SERPL-SCNC: 123 MMOL/L (ref 135–144)
SODIUM SERPL-SCNC: 125 MMOL/L (ref 135–144)
SODIUM UR-SCNC: 20 MMOL/L
SODIUM UR-SCNC: 26 MMOL/L
SP GR UR STRIP: 1.01 (ref 1–1.03)
SP GR UR STRIP: 1.01 (ref 1–1.03)
TOTAL PROTEIN, URINE: 23 MG/DL
TOTAL PROTEIN, URINE: 8 MG/DL
UROBILINOGEN UR STRIP-ACNC: NORMAL EU/DL (ref 0–1)
UROBILINOGEN UR STRIP-ACNC: NORMAL EU/DL (ref 0–1)
WBC #/AREA URNS HPF: ABNORMAL /HPF (ref 0–5)
WBC #/AREA URNS HPF: ABNORMAL /HPF (ref 0–5)

## 2023-08-22 PROCEDURE — 94640 AIRWAY INHALATION TREATMENT: CPT

## 2023-08-22 PROCEDURE — 80048 BASIC METABOLIC PNL TOTAL CA: CPT

## 2023-08-22 PROCEDURE — 85610 PROTHROMBIN TIME: CPT

## 2023-08-22 PROCEDURE — 2000000000 HC ICU R&B

## 2023-08-22 PROCEDURE — 94761 N-INVAS EAR/PLS OXIMETRY MLT: CPT

## 2023-08-22 PROCEDURE — 83036 HEMOGLOBIN GLYCOSYLATED A1C: CPT

## 2023-08-22 PROCEDURE — 2580000003 HC RX 258: Performed by: NURSE PRACTITIONER

## 2023-08-22 PROCEDURE — 6370000000 HC RX 637 (ALT 250 FOR IP): Performed by: STUDENT IN AN ORGANIZED HEALTH CARE EDUCATION/TRAINING PROGRAM

## 2023-08-22 PROCEDURE — 6370000000 HC RX 637 (ALT 250 FOR IP): Performed by: NURSE PRACTITIONER

## 2023-08-22 PROCEDURE — 84300 ASSAY OF URINE SODIUM: CPT

## 2023-08-22 PROCEDURE — 83935 ASSAY OF URINE OSMOLALITY: CPT

## 2023-08-22 PROCEDURE — 84132 ASSAY OF SERUM POTASSIUM: CPT

## 2023-08-22 PROCEDURE — 6360000002 HC RX W HCPCS: Performed by: NURSE PRACTITIONER

## 2023-08-22 PROCEDURE — 84156 ASSAY OF PROTEIN URINE: CPT

## 2023-08-22 PROCEDURE — 84133 ASSAY OF URINE POTASSIUM: CPT

## 2023-08-22 PROCEDURE — 81001 URINALYSIS AUTO W/SCOPE: CPT

## 2023-08-22 PROCEDURE — 87086 URINE CULTURE/COLONY COUNT: CPT

## 2023-08-22 PROCEDURE — 99223 1ST HOSP IP/OBS HIGH 75: CPT | Performed by: STUDENT IN AN ORGANIZED HEALTH CARE EDUCATION/TRAINING PROGRAM

## 2023-08-22 PROCEDURE — 2580000003 HC RX 258: Performed by: STUDENT IN AN ORGANIZED HEALTH CARE EDUCATION/TRAINING PROGRAM

## 2023-08-22 PROCEDURE — 82570 ASSAY OF URINE CREATININE: CPT

## 2023-08-22 PROCEDURE — 82803 BLOOD GASES ANY COMBINATION: CPT

## 2023-08-22 PROCEDURE — 99211 OFF/OP EST MAY X REQ PHY/QHP: CPT

## 2023-08-22 PROCEDURE — 83930 ASSAY OF BLOOD OSMOLALITY: CPT

## 2023-08-22 PROCEDURE — 82436 ASSAY OF URINE CHLORIDE: CPT

## 2023-08-22 PROCEDURE — 83735 ASSAY OF MAGNESIUM: CPT

## 2023-08-22 PROCEDURE — 6360000002 HC RX W HCPCS: Performed by: STUDENT IN AN ORGANIZED HEALTH CARE EDUCATION/TRAINING PROGRAM

## 2023-08-22 PROCEDURE — 99223 1ST HOSP IP/OBS HIGH 75: CPT | Performed by: INTERNAL MEDICINE

## 2023-08-22 PROCEDURE — 2700000000 HC OXYGEN THERAPY PER DAY

## 2023-08-22 PROCEDURE — 82947 ASSAY GLUCOSE BLOOD QUANT: CPT

## 2023-08-22 PROCEDURE — 36600 WITHDRAWAL OF ARTERIAL BLOOD: CPT

## 2023-08-22 PROCEDURE — 6370000000 HC RX 637 (ALT 250 FOR IP): Performed by: INTERNAL MEDICINE

## 2023-08-22 PROCEDURE — 76775 US EXAM ABDO BACK WALL LIM: CPT

## 2023-08-22 PROCEDURE — 6360000002 HC RX W HCPCS: Performed by: INTERNAL MEDICINE

## 2023-08-22 PROCEDURE — 36415 COLL VENOUS BLD VENIPUNCTURE: CPT

## 2023-08-22 RX ORDER — LANOLIN ALCOHOL/MO/W.PET/CERES
325 CREAM (GRAM) TOPICAL
Status: DISCONTINUED | OUTPATIENT
Start: 2023-08-23 | End: 2023-08-24 | Stop reason: HOSPADM

## 2023-08-22 RX ORDER — DEXTROSE MONOHYDRATE 100 MG/ML
INJECTION, SOLUTION INTRAVENOUS CONTINUOUS PRN
Status: DISCONTINUED | OUTPATIENT
Start: 2023-08-22 | End: 2023-08-24 | Stop reason: HOSPADM

## 2023-08-22 RX ORDER — GLUCAGON 1 MG/ML
1 KIT INJECTION PRN
Status: DISCONTINUED | OUTPATIENT
Start: 2023-08-22 | End: 2023-08-22 | Stop reason: SDUPTHER

## 2023-08-22 RX ORDER — ACETAMINOPHEN 325 MG/1
650 TABLET ORAL EVERY 6 HOURS PRN
Status: DISCONTINUED | OUTPATIENT
Start: 2023-08-22 | End: 2023-08-24 | Stop reason: HOSPADM

## 2023-08-22 RX ORDER — DEXTROSE MONOHYDRATE 100 MG/ML
INJECTION, SOLUTION INTRAVENOUS CONTINUOUS PRN
Status: DISCONTINUED | OUTPATIENT
Start: 2023-08-22 | End: 2023-08-22 | Stop reason: SDUPTHER

## 2023-08-22 RX ORDER — LANOLIN ALCOHOL/MO/W.PET/CERES
400 CREAM (GRAM) TOPICAL DAILY
Status: DISCONTINUED | OUTPATIENT
Start: 2023-08-22 | End: 2023-08-24 | Stop reason: HOSPADM

## 2023-08-22 RX ORDER — SODIUM CHLORIDE 1 G/1
1 TABLET ORAL 2 TIMES DAILY
Status: DISCONTINUED | OUTPATIENT
Start: 2023-08-22 | End: 2023-08-22

## 2023-08-22 RX ORDER — CITALOPRAM 20 MG/1
20 TABLET ORAL DAILY
Status: DISCONTINUED | OUTPATIENT
Start: 2023-08-22 | End: 2023-08-24 | Stop reason: HOSPADM

## 2023-08-22 RX ORDER — ASPIRIN 81 MG/1
81 TABLET ORAL DAILY
Status: DISCONTINUED | OUTPATIENT
Start: 2023-08-22 | End: 2023-08-22

## 2023-08-22 RX ORDER — BUMETANIDE 1 MG/1
1 TABLET ORAL DAILY
Status: DISCONTINUED | OUTPATIENT
Start: 2023-08-22 | End: 2023-08-22

## 2023-08-22 RX ORDER — FUROSEMIDE 10 MG/ML
40 INJECTION INTRAMUSCULAR; INTRAVENOUS ONCE
Status: COMPLETED | OUTPATIENT
Start: 2023-08-22 | End: 2023-08-22

## 2023-08-22 RX ORDER — OXYCODONE HYDROCHLORIDE 5 MG/1
10 TABLET ORAL EVERY 6 HOURS PRN
Status: DISCONTINUED | OUTPATIENT
Start: 2023-08-22 | End: 2023-08-24 | Stop reason: HOSPADM

## 2023-08-22 RX ORDER — AMIODARONE HYDROCHLORIDE 200 MG/1
200 TABLET ORAL 2 TIMES DAILY
Status: DISCONTINUED | OUTPATIENT
Start: 2023-08-22 | End: 2023-08-22

## 2023-08-22 RX ORDER — INSULIN GLARGINE 100 [IU]/ML
60 INJECTION, SOLUTION SUBCUTANEOUS 2 TIMES DAILY
Status: DISCONTINUED | OUTPATIENT
Start: 2023-08-22 | End: 2023-08-24 | Stop reason: HOSPADM

## 2023-08-22 RX ORDER — ALBUTEROL SULFATE 90 UG/1
2 AEROSOL, METERED RESPIRATORY (INHALATION) EVERY 6 HOURS PRN
Status: DISCONTINUED | OUTPATIENT
Start: 2023-08-22 | End: 2023-08-24 | Stop reason: HOSPADM

## 2023-08-22 RX ORDER — TRAZODONE HYDROCHLORIDE 50 MG/1
75 TABLET ORAL NIGHTLY
Status: DISCONTINUED | OUTPATIENT
Start: 2023-08-22 | End: 2023-08-24 | Stop reason: HOSPADM

## 2023-08-22 RX ORDER — INSULIN LISPRO 100 [IU]/ML
0-8 INJECTION, SOLUTION INTRAVENOUS; SUBCUTANEOUS
Status: DISCONTINUED | OUTPATIENT
Start: 2023-08-22 | End: 2023-08-24 | Stop reason: HOSPADM

## 2023-08-22 RX ORDER — INSULIN LISPRO 100 [IU]/ML
0-4 INJECTION, SOLUTION INTRAVENOUS; SUBCUTANEOUS NIGHTLY
Status: DISCONTINUED | OUTPATIENT
Start: 2023-08-22 | End: 2023-08-24 | Stop reason: HOSPADM

## 2023-08-22 RX ORDER — CITALOPRAM 20 MG/1
20 TABLET ORAL DAILY
Status: CANCELLED | OUTPATIENT
Start: 2023-08-23

## 2023-08-22 RX ORDER — SODIUM CHLORIDE 1 G/1
2 TABLET ORAL 2 TIMES DAILY WITH MEALS
Status: DISCONTINUED | OUTPATIENT
Start: 2023-08-22 | End: 2023-08-23

## 2023-08-22 RX ORDER — LANOLIN ALCOHOL/MO/W.PET/CERES
400 CREAM (GRAM) TOPICAL DAILY
Status: DISCONTINUED | OUTPATIENT
Start: 2023-08-22 | End: 2023-08-22

## 2023-08-22 RX ORDER — IPRATROPIUM BROMIDE AND ALBUTEROL SULFATE 2.5; .5 MG/3ML; MG/3ML
1 SOLUTION RESPIRATORY (INHALATION) EVERY 4 HOURS
Status: DISCONTINUED | OUTPATIENT
Start: 2023-08-22 | End: 2023-08-22

## 2023-08-22 RX ORDER — OXYCODONE HYDROCHLORIDE 5 MG/1
10 TABLET ORAL EVERY 6 HOURS PRN
Status: DISCONTINUED | OUTPATIENT
Start: 2023-08-22 | End: 2023-08-22 | Stop reason: SDUPTHER

## 2023-08-22 RX ORDER — DEXTROSE AND SODIUM CHLORIDE 5; .9 G/100ML; G/100ML
INJECTION, SOLUTION INTRAVENOUS CONTINUOUS
Status: DISCONTINUED | OUTPATIENT
Start: 2023-08-22 | End: 2023-08-22

## 2023-08-22 RX ORDER — DOCUSATE SODIUM 100 MG/1
100 CAPSULE, LIQUID FILLED ORAL DAILY
Status: DISCONTINUED | OUTPATIENT
Start: 2023-08-22 | End: 2023-08-24 | Stop reason: HOSPADM

## 2023-08-22 RX ORDER — FLUDROCORTISONE ACETATE 0.1 MG/1
0.1 TABLET ORAL ONCE
Status: COMPLETED | OUTPATIENT
Start: 2023-08-22 | End: 2023-08-22

## 2023-08-22 RX ORDER — IPRATROPIUM BROMIDE AND ALBUTEROL SULFATE 2.5; .5 MG/3ML; MG/3ML
1 SOLUTION RESPIRATORY (INHALATION) EVERY 4 HOURS PRN
Status: DISCONTINUED | OUTPATIENT
Start: 2023-08-22 | End: 2023-08-24 | Stop reason: HOSPADM

## 2023-08-22 RX ORDER — MIDODRINE HYDROCHLORIDE 5 MG/1
10 TABLET ORAL
Status: DISCONTINUED | OUTPATIENT
Start: 2023-08-22 | End: 2023-08-24 | Stop reason: HOSPADM

## 2023-08-22 RX ORDER — AMIODARONE HYDROCHLORIDE 200 MG/1
200 TABLET ORAL 2 TIMES DAILY
Status: DISCONTINUED | OUTPATIENT
Start: 2023-08-22 | End: 2023-08-24 | Stop reason: HOSPADM

## 2023-08-22 RX ORDER — BUMETANIDE 1 MG/1
1 TABLET ORAL DAILY
Status: DISCONTINUED | OUTPATIENT
Start: 2023-08-22 | End: 2023-08-23

## 2023-08-22 RX ORDER — PANTOPRAZOLE SODIUM 40 MG/1
40 TABLET, DELAYED RELEASE ORAL
Status: DISCONTINUED | OUTPATIENT
Start: 2023-08-23 | End: 2023-08-24 | Stop reason: HOSPADM

## 2023-08-22 RX ORDER — OXYBUTYNIN CHLORIDE 5 MG/1
10 TABLET, EXTENDED RELEASE ORAL DAILY
Status: DISCONTINUED | OUTPATIENT
Start: 2023-08-22 | End: 2023-08-22

## 2023-08-22 RX ORDER — OXYBUTYNIN CHLORIDE 5 MG/1
10 TABLET, EXTENDED RELEASE ORAL DAILY
Status: DISCONTINUED | OUTPATIENT
Start: 2023-08-22 | End: 2023-08-24 | Stop reason: HOSPADM

## 2023-08-22 RX ORDER — HYDROXYZINE HYDROCHLORIDE 25 MG/1
25 TABLET, FILM COATED ORAL EVERY 6 HOURS PRN
Status: DISCONTINUED | OUTPATIENT
Start: 2023-08-22 | End: 2023-08-24 | Stop reason: HOSPADM

## 2023-08-22 RX ORDER — TAMSULOSIN HYDROCHLORIDE 0.4 MG/1
0.4 CAPSULE ORAL DAILY
Status: DISCONTINUED | OUTPATIENT
Start: 2023-08-22 | End: 2023-08-24 | Stop reason: HOSPADM

## 2023-08-22 RX ORDER — GLUCAGON 1 MG/ML
1 KIT INJECTION PRN
Status: DISCONTINUED | OUTPATIENT
Start: 2023-08-22 | End: 2023-08-24 | Stop reason: HOSPADM

## 2023-08-22 RX ORDER — METOPROLOL TARTRATE 50 MG/1
50 TABLET, FILM COATED ORAL 2 TIMES DAILY
Status: DISCONTINUED | OUTPATIENT
Start: 2023-08-22 | End: 2023-08-22

## 2023-08-22 RX ORDER — CITALOPRAM 20 MG/1
20 TABLET ORAL DAILY
Status: DISCONTINUED | OUTPATIENT
Start: 2023-08-22 | End: 2023-08-22

## 2023-08-22 RX ORDER — TAMSULOSIN HYDROCHLORIDE 0.4 MG/1
0.4 CAPSULE ORAL DAILY
Status: DISCONTINUED | OUTPATIENT
Start: 2023-08-22 | End: 2023-08-22

## 2023-08-22 RX ORDER — SODIUM POLYSTYRENE SULFONATE 15 G/60ML
15 SUSPENSION ORAL; RECTAL DAILY
Status: DISCONTINUED | OUTPATIENT
Start: 2023-08-22 | End: 2023-08-22

## 2023-08-22 RX ORDER — QUETIAPINE FUMARATE 50 MG/1
50 TABLET, EXTENDED RELEASE ORAL NIGHTLY
Status: DISCONTINUED | OUTPATIENT
Start: 2023-08-22 | End: 2023-08-24 | Stop reason: HOSPADM

## 2023-08-22 RX ORDER — BUMETANIDE 1 MG/1
1 TABLET ORAL DAILY
Status: CANCELLED | OUTPATIENT
Start: 2023-08-23

## 2023-08-22 RX ORDER — ASPIRIN 81 MG/1
81 TABLET ORAL DAILY
Status: DISCONTINUED | OUTPATIENT
Start: 2023-08-22 | End: 2023-08-24 | Stop reason: HOSPADM

## 2023-08-22 RX ORDER — CLONAZEPAM 0.5 MG/1
0.5 TABLET ORAL EVERY 12 HOURS
Status: DISCONTINUED | OUTPATIENT
Start: 2023-08-22 | End: 2023-08-22

## 2023-08-22 RX ORDER — CLONAZEPAM 1 MG/1
1 TABLET ORAL EVERY 12 HOURS
Status: DISCONTINUED | OUTPATIENT
Start: 2023-08-22 | End: 2023-08-24 | Stop reason: HOSPADM

## 2023-08-22 RX ADMIN — ENOXAPARIN SODIUM 40 MG: 100 INJECTION SUBCUTANEOUS at 01:34

## 2023-08-22 RX ADMIN — SODIUM CHLORIDE, PRESERVATIVE FREE 10 ML: 5 INJECTION INTRAVENOUS at 22:19

## 2023-08-22 RX ADMIN — INSULIN HUMAN 10 UNITS: 100 INJECTION, SOLUTION PARENTERAL at 12:19

## 2023-08-22 RX ADMIN — DEXTROSE MONOHYDRATE 250 ML: 100 INJECTION, SOLUTION INTRAVENOUS at 04:16

## 2023-08-22 RX ADMIN — SODIUM CHLORIDE, PRESERVATIVE FREE 20 ML: 5 INJECTION INTRAVENOUS at 14:52

## 2023-08-22 RX ADMIN — FLUDROCORTISONE ACETATE 0.1 MG: 0.1 TABLET ORAL at 14:51

## 2023-08-22 RX ADMIN — SODIUM ZIRCONIUM CYCLOSILICATE 10 G: 10 POWDER, FOR SUSPENSION ORAL at 22:19

## 2023-08-22 RX ADMIN — SODIUM ZIRCONIUM CYCLOSILICATE 10 G: 10 POWDER, FOR SUSPENSION ORAL at 14:51

## 2023-08-22 RX ADMIN — HYDROCORTISONE SODIUM SUCCINATE 100 MG: 100 INJECTION, POWDER, FOR SOLUTION INTRAMUSCULAR; INTRAVENOUS at 10:35

## 2023-08-22 RX ADMIN — BUMETANIDE 1 MG: 1 TABLET ORAL at 14:16

## 2023-08-22 RX ADMIN — ENOXAPARIN SODIUM 40 MG: 100 INJECTION SUBCUTANEOUS at 09:25

## 2023-08-22 RX ADMIN — MIDODRINE HYDROCHLORIDE 10 MG: 5 TABLET ORAL at 17:51

## 2023-08-22 RX ADMIN — SODIUM CHLORIDE, PRESERVATIVE FREE 10 ML: 5 INJECTION INTRAVENOUS at 10:36

## 2023-08-22 RX ADMIN — LEVOTHYROXINE SODIUM 200 MCG: 75 TABLET ORAL at 22:17

## 2023-08-22 RX ADMIN — CLONAZEPAM 0.5 MG: 0.5 TABLET ORAL at 09:25

## 2023-08-22 RX ADMIN — QUETIAPINE FUMARATE 50 MG: 50 TABLET, EXTENDED RELEASE ORAL at 22:17

## 2023-08-22 RX ADMIN — ENOXAPARIN SODIUM 40 MG: 100 INJECTION SUBCUTANEOUS at 23:31

## 2023-08-22 RX ADMIN — Medication 400 MG: at 14:17

## 2023-08-22 RX ADMIN — DEXTROSE MONOHYDRATE 250 ML: 100 INJECTION, SOLUTION INTRAVENOUS at 02:44

## 2023-08-22 RX ADMIN — INSULIN HUMAN 10 UNITS: 100 INJECTION, SOLUTION PARENTERAL at 01:30

## 2023-08-22 RX ADMIN — CITALOPRAM HYDROBROMIDE 20 MG: 20 TABLET ORAL at 14:17

## 2023-08-22 RX ADMIN — HYDROCORTISONE SODIUM SUCCINATE 100 MG: 100 INJECTION, POWDER, FOR SOLUTION INTRAMUSCULAR; INTRAVENOUS at 17:50

## 2023-08-22 RX ADMIN — DEXTROSE AND SODIUM CHLORIDE: 5; 900 INJECTION, SOLUTION INTRAVENOUS at 04:33

## 2023-08-22 RX ADMIN — DEXTROSE MONOHYDRATE 250 ML: 100 INJECTION, SOLUTION INTRAVENOUS at 01:30

## 2023-08-22 RX ADMIN — AMIODARONE HYDROCHLORIDE 200 MG: 200 TABLET ORAL at 22:18

## 2023-08-22 RX ADMIN — OXYBUTYNIN CHLORIDE 10 MG: 5 TABLET, EXTENDED RELEASE ORAL at 14:16

## 2023-08-22 RX ADMIN — TAMSULOSIN HYDROCHLORIDE 0.4 MG: 0.4 CAPSULE ORAL at 14:17

## 2023-08-22 RX ADMIN — ASPIRIN 81 MG: 81 TABLET, COATED ORAL at 14:17

## 2023-08-22 RX ADMIN — AMIODARONE HYDROCHLORIDE 200 MG: 200 TABLET ORAL at 14:17

## 2023-08-22 RX ADMIN — FUROSEMIDE 40 MG: 10 INJECTION, SOLUTION INTRAMUSCULAR; INTRAVENOUS at 14:51

## 2023-08-22 RX ADMIN — HYDROXYZINE HYDROCHLORIDE 25 MG: 25 TABLET, FILM COATED ORAL at 23:22

## 2023-08-22 RX ADMIN — OXYCODONE HYDROCHLORIDE 10 MG: 5 TABLET ORAL at 23:20

## 2023-08-22 RX ADMIN — OXYCODONE HYDROCHLORIDE 10 MG: 5 TABLET ORAL at 14:17

## 2023-08-22 RX ADMIN — INSULIN GLARGINE 60 UNITS: 100 INJECTION, SOLUTION SUBCUTANEOUS at 22:43

## 2023-08-22 RX ADMIN — DEXTROSE MONOHYDRATE 250 ML: 100 INJECTION, SOLUTION INTRAVENOUS at 12:25

## 2023-08-22 RX ADMIN — SODIUM CHLORIDE 2 G: 1 TABLET ORAL at 17:51

## 2023-08-22 RX ADMIN — INSULIN GLARGINE 60 UNITS: 100 INJECTION, SOLUTION SUBCUTANEOUS at 12:33

## 2023-08-22 RX ADMIN — MIDODRINE HYDROCHLORIDE 10 MG: 5 TABLET ORAL at 14:17

## 2023-08-22 RX ADMIN — IPRATROPIUM BROMIDE AND ALBUTEROL SULFATE 1 DOSE: .5; 3 SOLUTION RESPIRATORY (INHALATION) at 12:50

## 2023-08-22 RX ADMIN — CLONAZEPAM 1 MG: 1 TABLET ORAL at 22:18

## 2023-08-22 RX ADMIN — DOCUSATE SODIUM 100 MG: 100 CAPSULE, LIQUID FILLED ORAL at 22:38

## 2023-08-22 RX ADMIN — TRAZODONE HYDROCHLORIDE 75 MG: 50 TABLET ORAL at 22:17

## 2023-08-22 RX ADMIN — SODIUM POLYSTYRENE SULFONATE 15 G: 15 SUSPENSION ORAL; RECTAL at 11:22

## 2023-08-22 ASSESSMENT — PAIN SCALES - GENERAL
PAINLEVEL_OUTOF10: 10
PAINLEVEL_OUTOF10: 5
PAINLEVEL_OUTOF10: 9
PAINLEVEL_OUTOF10: 9

## 2023-08-22 ASSESSMENT — PAIN SCALES - WONG BAKER: WONGBAKER_NUMERICALRESPONSE: 0

## 2023-08-22 ASSESSMENT — PAIN DESCRIPTION - LOCATION
LOCATION: BUTTOCKS;LEG;GENERALIZED
LOCATION: BUTTOCKS;GENERALIZED;LEG
LOCATION: BUTTOCKS

## 2023-08-22 ASSESSMENT — PAIN DESCRIPTION - ORIENTATION
ORIENTATION: MID
ORIENTATION: MID

## 2023-08-22 ASSESSMENT — PAIN DESCRIPTION - DESCRIPTORS
DESCRIPTORS: ACHING;SHARP;SORE
DESCRIPTORS: ACHING;SHARP;SORE

## 2023-08-22 ASSESSMENT — PAIN DESCRIPTION - FREQUENCY: FREQUENCY: CONTINUOUS

## 2023-08-22 ASSESSMENT — PAIN DESCRIPTION - PAIN TYPE: TYPE: CHRONIC PAIN;ACUTE PAIN

## 2023-08-22 NOTE — H&P
[facility]  Office: [phone]  Unknown Afsaneh, DO, Joe Gutierrez, DO, Virgie Benitez Blood, DO, Vijay Garrison MD, Barbara Barrett MD, Gurdeep Norman MD, Colonel Titi MD,  Shira Pedersen MD, Yobani Holland MD, Forest Beck DO, Laz Wang MD,  Kathrine Klinefelter, MD, Liza Mcfadden MD, Baltazar Amado DO, Rosas Au MD,  Errol Barrera MD, Leni Domingo MD, Josie Leslie MD, Reed Nash MD,  Monster Evans MD, Ike Brand MD, Peng Wang MD, Michelle Munson DO, Jose Sandy MD,  Sascha Trevino MD, Prashanth Byrd, Kathrine Leventhal, CNP, Destiney Powers, CNP, Sebastian Barnes, CNP,  Anup Harp, DNP, Ele Krueger, CNP, Shanta Astudillo, CNP, Casey Carvajal, CNP, Magan Franks, CNP, Claudia Grewal, CNP, Raven Westbrook PA-C, Sakina Serna, CNS, Jennifer Copeland, CNP, Yvonne Zhang, CNP         [facility]    HISTORY AND PHYSICAL EXAMINATION            Date:   8/22/2023  Patient name:  Marcia Kelly  Date of admission:  8/21/2023  8:05 PM  MRN:   [id number]  Account:  [de-identified]  YOB: 1965  PCP:    Radha Brock MD  Room:   545/786-18  Code Status:    Full Code      History Obtained From:     patient    History of Present Illness:     Marcia Kelly is a 60 y.o. Non- / non  male who presents with Abnormal Lab (Facility reports high potassium)   and is admitted to the hospital for the management of Hyperkalemia. 26-year-old male with past medical history of COPD on 3 L nasal cannula at baseline, GERD, anxiety, CHF presents emerged department for abnormal lab value at his skilled nurse facility. Patient was found to have hyperkalemia was recommended to come into the ED. Patient denies any chest pain, palpitation, headache, numbness or tingling. Patient does have a history of chronic pain on oxycodone on home.   Patient has chronic bilateral

## 2023-08-22 NOTE — PLAN OF CARE
Problem: Respiratory - Adult  Goal: Achieves optimal ventilation and oxygenation  Flowsheets (Taken 8/22/2023 1259)  Achieves optimal ventilation and oxygenation:   Assess for changes in respiratory status   Position to facilitate oxygenation and minimize respiratory effort   Oxygen supplementation based on oxygen saturation or arterial blood gases   Assess and instruct to report shortness of breath or any respiratory difficulty

## 2023-08-22 NOTE — ED PROVIDER NOTES
[facility] Emergency Department  [address] [address] [facility] [address]  Phone: [phone]  Fax: [phone]      Pt Name: Swetha Lund  [id number]  [address] 1965  Date of evaluation: 8/21/2023      CHIEF COMPLAINT       Chief Complaint   Patient presents with    Abnormal Lab     Facility reports high potassium       HISTORY OF PRESENT ILLNESS   58-year-old male who comes from nursing facility due to an elevated potassium. Patient unsure why he had lab work checked today but a potassium of 6.3 was noted. He is denying any new symptoms. He has a lot of chronic medical issues such as a CHF lung disease requiring oxygen. Denies any known renal problems. Records do not show him being on potassium. REVIEW OF SYSTEMS     Review of Systems   Constitutional:  Negative for chills and fever. Neurological:  Negative for weakness. All other systems reviewed and are negative.       PAST MEDICAL HISTORY    has a past medical history of A-fib ([address]), Anxiety and depression, Back pain, chronic, BPH (benign prostatic hyperplasia), Cellulitis of left lower extremity, Cellulitis of right lower extremity, CHF (congestive heart failure) ([address]), Chronic respiratory failure with hypoxia ([address]), Cirrhosis ([address]), Diabetes mellitus ([address]), Epididymoorchitis, GERD (gastroesophageal reflux disease), H/O cardiac catheterization, Hepatitis, Hiatal hernia, History of alcohol abuse, Hyperlipidemia, Hypertension, IBS (irritable bowel syndrome), Incisional hernia with obstruction but no gangrene, Klebsiella sepsis ([address]), Metabolic encephalopathy, Morbid obesity ([address]), Neuropathy, On home oxygen therapy, On home oxygen therapy, Pancreatitis, Poisoning by insulin and oral hypoglycemic (antidiabetic) drugs, accidental (unintentional), initial encounter, Primary osteoarthritis of right knee, Retention, urine, SBO (small bowel obstruction) ([address]), Secondary hypercoagulable state ([address]), Septic shock ([address]), Sleep dictations but occasionally words are mis-transcribed.)    Dax Orlando MD MD  Emergency Physician Attending          Dax Orlando MD  08/21/23 8275

## 2023-08-22 NOTE — PLAN OF CARE
Problem: Discharge Planning  Goal: Discharge to home or other facility with appropriate resources  Outcome: Progressing  Flowsheets (Taken 8/22/2023 0750)  Discharge to home or other facility with appropriate resources: Identify barriers to discharge with patient and caregiver     Problem: Safety - Adult  Goal: Free from fall injury  Outcome: Progressing     Problem: Pain  Goal: Verbalizes/displays adequate comfort level or baseline comfort level  Outcome: Progressing  Flowsheets (Taken 8/22/2023 0805)  Verbalizes/displays adequate comfort level or baseline comfort level:   Encourage patient to monitor pain and request assistance   Implement non-pharmacological measures as appropriate and evaluate response     Problem: Chronic Conditions and Co-morbidities  Goal: Patient's chronic conditions and co-morbidity symptoms are monitored and maintained or improved  Outcome: Progressing  Flowsheets (Taken 8/22/2023 0750)  Care Plan - Patient's Chronic Conditions and Co-Morbidity Symptoms are Monitored and Maintained or Improved: Monitor and assess patient's chronic conditions and comorbid symptoms for stability, deterioration, or improvement     Problem: Respiratory - Adult  Goal: Achieves optimal ventilation and oxygenation  8/22/2023 1900 by Linda Delgado RN  Outcome: Progressing  8/22/2023 1259 by Betito aLke RCP  Flowsheets (Taken 8/22/2023 1259)  Achieves optimal ventilation and oxygenation:   Assess for changes in respiratory status   Position to facilitate oxygenation and minimize respiratory effort   Oxygen supplementation based on oxygen saturation or arterial blood gases   Assess and instruct to report shortness of breath or any respiratory difficulty   Patient is progressing towards goals.

## 2023-08-22 NOTE — PROGRESS NOTES
[address] [facility]  PROGRESS NOTE    Room # 340/340-01   Name: Rosa Elena Westbrook            Bahai: Rachael Avitia      Reason for visit: Routine    I visited the patient. Admit Date & Time: 8/21/2023  8:05 PM    Assessment:  Rosa Elena Westbrook is a 60 y.o. male in the hospital because \"hypekalemia\". Upon entering the room patient was lying in bed. Patient shared with this writer concerns with suffering. Patient shared that his  is supposed to visit him soon. While this writer was leaving patient's room patient's  entered patient's room. Intervention:  I introduced myself and my title as  I offered space for patient  to express feelings, needs, and concerns and provided a ministry presence. This writer actively listened to patient. This writer told patient that if he needs to speak with this writer again he can ask his nurse to reach out to spiritual care dept. This writer introduced himself to patient's  who was visiting patient. Outcome:  Patient and patient's  expressed gratitude for this  visit     Plan:  Chaplains will remain available to offer spiritual and emotional support as needed. Electronically signed by Any Dickerson on 8/22/2023 at 2:45 PM.  [address]        08/22/23 1443   Encounter Summary   Encounter Overview/Reason  Initial Encounter   Service Provided For: Patient   Referral/Consult From:  [address] [employer]/j[employer]e community; Children   Last Encounter  08/22/23   Complexity of Encounter Low   Begin Time 1430   End Time  1437   Total Time Calculated 7 min   Spiritual/Emotional needs   Type Spiritual Support   Assessment/Intervention/Outcome   Assessment Concerns with suffering; Anxious   Intervention Active listening;Sustaining Presence/Ministry of presence; Explored/Affirmed feelings, thoughts, concerns; Discussed belief system/Baptism practices/janae   Outcome

## 2023-08-22 NOTE — CONSULTS
Renal Consult Note    Patient :  Yanira Luna; 60 y.o. MRN# [id number]  Location:  [address] [place] [address]  Attending:  Bianka Granda MD  Admit Date:  8/21/2023   Hospital Day: 1    Reason for Consult:     Asked by Dr Bianka Granda MD to see for hyperkalemia and LEONARDA/Elevated Creatinine. History Obtained From:     Patient, electronic medical record, patient's nurse. History of Present Illness:     Yanira Luna; 60 y.o. male with past medical history of diabetes type 2, hypertension, sleep apnea with hypercapnia, morbid obesity, history of alcohol liver cirrhosis with portal hypertension, history of chronic urinary bladder neurogenic dysfunction with chronic Clemons in place, CKD 3 likely due to diabetic and hypertensive nephrosclerosis and further complicated chronic obstructive uropathy with baseline creatinine from 1.3-1.5 mg/dl currently does not follow up with nephrology presented to the hospital with the chief complaint of abnormal lab work. Initial BMP on admission showed sodium 123 and potassium 6.6, bicarb 33, chloride 86, BUN 45, creatinine 1.8 mg/dl, calcium 8.8. Patient had UA which showed negative nitrates, moderate leuk esterase, WBC 10-20, RBC 0-2. Patient did receive 1 dose of Lokelma overnight and also Kayexalate today. He also did get insulin dextrose as well. Patient has a chronic Clemons in place. Home medications do include Kayexalate 15 g daily, Bumex 1 mg daily, potassium chloride 20 mEq daily. Nephrology was consulted due to acute kidney injury along with hyperkalemia. No history of recent contrast exposure, No h/o prolonged NSAIDs use in the past, No h/o nephrolithiasis, No recent skin rashes or arthralgias, No hematuria or pyuria noticed in the recent past. Doesn't report any reduction in the urine output recently. Past History/Allergies? Social History:     Past Medical History:   Diagnosis Date    A-fib ([address]) 4/18/2023    Anxiety and depression     Back pain, chronic     BPH (benign

## 2023-08-22 NOTE — PLAN OF CARE
0000 Patient admitted with many wounds. Coccyx, sacrum, gluteal folds, scrotum, inner thighs, posterior knee folds, pannus folds, left inner arm. Patient states that he's had these wounds for a \"very long time\". Patient also states he's been oxygen dependent for five years because of \"something with the lungs or something\" and that he's had a wells catheter \"for five years\" but cannot recall original placement rationale for wells catheter. 7366   Requested home medications be ordered to EDILIA Valdez NP. NP instructs RN to first retrieve reports from pharmacy to verify if patient's home med list, is his home med list. Awaiting pharmacy's response. Updated patient. 0130 S. Chelsi Faria states she will not re-order home meds secondary to patient's blood pressure. Patient offered tylenol, which is ordered, patient declined all meds except his oxycodone, but informed that NP will not order. Patient will jonn for MD to round in AM.     0400 Vitals shared with Chelsi Faria NP. Twice in the last two hours sugar has dropped to 60s and D10 boluses given. New orders to change fluids to D5NS. Patient responsive. Patient does not have teeth, so chewables not an option.                              Problem: Discharge Planning  Goal: Discharge to home or other facility with appropriate resources  Outcome: Progressing

## 2023-08-22 NOTE — PROGRESS NOTES
Detail Level: Detailed Noted [facility] nurse consult for \"multiple wounds\" . Patient refuses to allow assessments at this time until he gets his pain medication and some sleep. Previous notes from [facility] Nurse indicate history of Incontinence Associated Skin Damage, irritant contact dermatitis due to  fecal and urinary incontinence and Intertrigo to abdominal pannus. Suggest placement in a bariatric bed with Low Air Loss to offer microclimate control, use of Triad cream and DriGo cloths to abdominal pannus. Bariatric bed order was placed with [facility]. Plan of Care reviewed with RN. Detail Level: Zone Detail Level: Generalized Detail Level: Simple

## 2023-08-23 ENCOUNTER — APPOINTMENT (OUTPATIENT)
Dept: GENERAL RADIOLOGY | Age: 58
DRG: 641 | End: 2023-08-23
Payer: MEDICARE

## 2023-08-23 LAB
ANION GAP SERPL CALCULATED.3IONS-SCNC: 4 MMOL/L (ref 9–17)
ANION GAP SERPL CALCULATED.3IONS-SCNC: 4 MMOL/L (ref 9–17)
ANION GAP SERPL CALCULATED.3IONS-SCNC: 5 MMOL/L (ref 9–17)
ANION GAP SERPL CALCULATED.3IONS-SCNC: 6 MMOL/L (ref 9–17)
BUN SERPL-MCNC: 36 MG/DL (ref 6–20)
BUN SERPL-MCNC: 36 MG/DL (ref 6–20)
BUN SERPL-MCNC: 38 MG/DL (ref 6–20)
BUN SERPL-MCNC: 39 MG/DL (ref 6–20)
CALCIUM SERPL-MCNC: 8.8 MG/DL (ref 8.6–10.4)
CALCIUM SERPL-MCNC: 8.9 MG/DL (ref 8.6–10.4)
CALCIUM SERPL-MCNC: 8.9 MG/DL (ref 8.6–10.4)
CALCIUM SERPL-MCNC: 9 MG/DL (ref 8.6–10.4)
CHLORIDE SERPL-SCNC: 88 MMOL/L (ref 98–107)
CHLORIDE SERPL-SCNC: 88 MMOL/L (ref 98–107)
CHLORIDE SERPL-SCNC: 89 MMOL/L (ref 98–107)
CHLORIDE SERPL-SCNC: 89 MMOL/L (ref 98–107)
CO2 SERPL-SCNC: 33 MMOL/L (ref 20–31)
CO2 SERPL-SCNC: 33 MMOL/L (ref 20–31)
CO2 SERPL-SCNC: 35 MMOL/L (ref 20–31)
CO2 SERPL-SCNC: 36 MMOL/L (ref 20–31)
CORTIS SERPL-MCNC: 38.7 UG/DL (ref 2.7–18.4)
CORTISOL COLLECTION INFO: ABNORMAL
CREAT SERPL-MCNC: 1.3 MG/DL (ref 0.7–1.2)
CREAT SERPL-MCNC: 1.4 MG/DL (ref 0.7–1.2)
CREAT UR-MCNC: 18.3 MG/DL (ref 39–259)
GFR SERPL CREATININE-BSD FRML MDRD: 58 ML/MIN/1.73M2
GFR SERPL CREATININE-BSD FRML MDRD: >60 ML/MIN/1.73M2
GLUCOSE BLD-MCNC: 124 MG/DL (ref 75–110)
GLUCOSE BLD-MCNC: 157 MG/DL (ref 75–110)
GLUCOSE BLD-MCNC: 158 MG/DL (ref 75–110)
GLUCOSE BLD-MCNC: 184 MG/DL (ref 75–110)
GLUCOSE SERPL-MCNC: 119 MG/DL (ref 70–99)
GLUCOSE SERPL-MCNC: 132 MG/DL (ref 70–99)
GLUCOSE SERPL-MCNC: 159 MG/DL (ref 70–99)
GLUCOSE SERPL-MCNC: 159 MG/DL (ref 70–99)
MAGNESIUM SERPL-MCNC: 2.1 MG/DL (ref 1.6–2.6)
POTASSIUM SERPL-SCNC: 4.8 MMOL/L (ref 3.7–5.3)
POTASSIUM SERPL-SCNC: 4.9 MMOL/L (ref 3.7–5.3)
POTASSIUM SERPL-SCNC: 5.1 MMOL/L (ref 3.7–5.3)
POTASSIUM SERPL-SCNC: 5.2 MMOL/L (ref 3.7–5.3)
PROCALCITONIN SERPL-MCNC: 1.12 NG/ML
SODIUM SERPL-SCNC: 126 MMOL/L (ref 135–144)
SODIUM SERPL-SCNC: 128 MMOL/L (ref 135–144)
TOTAL PROTEIN, URINE: 5 MG/DL
URINE TOTAL PROTEIN CREATININE RATIO: 0.27 (ref 0–0.2)

## 2023-08-23 PROCEDURE — 6360000002 HC RX W HCPCS: Performed by: STUDENT IN AN ORGANIZED HEALTH CARE EDUCATION/TRAINING PROGRAM

## 2023-08-23 PROCEDURE — 6370000000 HC RX 637 (ALT 250 FOR IP): Performed by: INTERNAL MEDICINE

## 2023-08-23 PROCEDURE — 99232 SBSQ HOSP IP/OBS MODERATE 35: CPT | Performed by: STUDENT IN AN ORGANIZED HEALTH CARE EDUCATION/TRAINING PROGRAM

## 2023-08-23 PROCEDURE — 84145 PROCALCITONIN (PCT): CPT

## 2023-08-23 PROCEDURE — 71045 X-RAY EXAM CHEST 1 VIEW: CPT

## 2023-08-23 PROCEDURE — 2060000000 HC ICU INTERMEDIATE R&B

## 2023-08-23 PROCEDURE — 80048 BASIC METABOLIC PNL TOTAL CA: CPT

## 2023-08-23 PROCEDURE — 83735 ASSAY OF MAGNESIUM: CPT

## 2023-08-23 PROCEDURE — 6360000002 HC RX W HCPCS: Performed by: NURSE PRACTITIONER

## 2023-08-23 PROCEDURE — 82533 TOTAL CORTISOL: CPT

## 2023-08-23 PROCEDURE — 2580000003 HC RX 258: Performed by: NURSE PRACTITIONER

## 2023-08-23 PROCEDURE — 36415 COLL VENOUS BLD VENIPUNCTURE: CPT

## 2023-08-23 PROCEDURE — 6370000000 HC RX 637 (ALT 250 FOR IP): Performed by: STUDENT IN AN ORGANIZED HEALTH CARE EDUCATION/TRAINING PROGRAM

## 2023-08-23 PROCEDURE — 2700000000 HC OXYGEN THERAPY PER DAY

## 2023-08-23 PROCEDURE — 82947 ASSAY GLUCOSE BLOOD QUANT: CPT

## 2023-08-23 PROCEDURE — 6360000002 HC RX W HCPCS: Performed by: INTERNAL MEDICINE

## 2023-08-23 PROCEDURE — 94761 N-INVAS EAR/PLS OXIMETRY MLT: CPT

## 2023-08-23 RX ORDER — FUROSEMIDE 10 MG/ML
40 INJECTION INTRAMUSCULAR; INTRAVENOUS ONCE
Status: COMPLETED | OUTPATIENT
Start: 2023-08-23 | End: 2023-08-23

## 2023-08-23 RX ORDER — CLONAZEPAM 0.5 MG/1
0.5 TABLET ORAL DAILY PRN
Status: DISCONTINUED | OUTPATIENT
Start: 2023-08-23 | End: 2023-08-24 | Stop reason: HOSPADM

## 2023-08-23 RX ORDER — BUMETANIDE 1 MG/1
1 TABLET ORAL 2 TIMES DAILY
Status: DISCONTINUED | OUTPATIENT
Start: 2023-08-23 | End: 2023-08-24 | Stop reason: HOSPADM

## 2023-08-23 RX ADMIN — BUMETANIDE 1 MG: 1 TABLET ORAL at 13:40

## 2023-08-23 RX ADMIN — TAMSULOSIN HYDROCHLORIDE 0.4 MG: 0.4 CAPSULE ORAL at 09:26

## 2023-08-23 RX ADMIN — FERROUS SULFATE TAB EC 325 MG (65 MG FE EQUIVALENT) 325 MG: 325 (65 FE) TABLET DELAYED RESPONSE at 09:25

## 2023-08-23 RX ADMIN — TRAZODONE HYDROCHLORIDE 75 MG: 50 TABLET ORAL at 21:09

## 2023-08-23 RX ADMIN — BUMETANIDE 1 MG: 1 TABLET ORAL at 21:10

## 2023-08-23 RX ADMIN — HYDROCORTISONE SODIUM SUCCINATE 100 MG: 100 INJECTION, POWDER, FOR SOLUTION INTRAMUSCULAR; INTRAVENOUS at 03:40

## 2023-08-23 RX ADMIN — OXYCODONE HYDROCHLORIDE 10 MG: 5 TABLET ORAL at 05:53

## 2023-08-23 RX ADMIN — LEVOTHYROXINE SODIUM 200 MCG: 75 TABLET ORAL at 21:11

## 2023-08-23 RX ADMIN — OXYBUTYNIN CHLORIDE 10 MG: 5 TABLET, EXTENDED RELEASE ORAL at 09:26

## 2023-08-23 RX ADMIN — SODIUM CHLORIDE, PRESERVATIVE FREE 20 ML: 5 INJECTION INTRAVENOUS at 11:15

## 2023-08-23 RX ADMIN — CLONAZEPAM 1 MG: 1 TABLET ORAL at 21:13

## 2023-08-23 RX ADMIN — ENOXAPARIN SODIUM 40 MG: 100 INJECTION SUBCUTANEOUS at 09:25

## 2023-08-23 RX ADMIN — MIDODRINE HYDROCHLORIDE 10 MG: 5 TABLET ORAL at 09:26

## 2023-08-23 RX ADMIN — INSULIN GLARGINE 60 UNITS: 100 INJECTION, SOLUTION SUBCUTANEOUS at 09:24

## 2023-08-23 RX ADMIN — OXYCODONE HYDROCHLORIDE 10 MG: 5 TABLET ORAL at 21:08

## 2023-08-23 RX ADMIN — CLONAZEPAM 0.5 MG: 1 TABLET ORAL at 13:40

## 2023-08-23 RX ADMIN — SODIUM CHLORIDE, PRESERVATIVE FREE 10 ML: 5 INJECTION INTRAVENOUS at 09:24

## 2023-08-23 RX ADMIN — MIDODRINE HYDROCHLORIDE 10 MG: 5 TABLET ORAL at 13:40

## 2023-08-23 RX ADMIN — FUROSEMIDE 40 MG: 10 INJECTION, SOLUTION INTRAMUSCULAR; INTRAVENOUS at 13:40

## 2023-08-23 RX ADMIN — ENOXAPARIN SODIUM 40 MG: 100 INJECTION SUBCUTANEOUS at 21:06

## 2023-08-23 RX ADMIN — SODIUM CHLORIDE 2 G: 1 TABLET ORAL at 09:26

## 2023-08-23 RX ADMIN — MIDODRINE HYDROCHLORIDE 10 MG: 5 TABLET ORAL at 16:55

## 2023-08-23 RX ADMIN — DOCUSATE SODIUM 100 MG: 100 CAPSULE, LIQUID FILLED ORAL at 21:11

## 2023-08-23 RX ADMIN — Medication 400 MG: at 09:25

## 2023-08-23 RX ADMIN — AMIODARONE HYDROCHLORIDE 200 MG: 200 TABLET ORAL at 09:25

## 2023-08-23 RX ADMIN — HYDROXYZINE HYDROCHLORIDE 25 MG: 25 TABLET, FILM COATED ORAL at 23:37

## 2023-08-23 RX ADMIN — SODIUM CHLORIDE, PRESERVATIVE FREE 10 ML: 5 INJECTION INTRAVENOUS at 21:34

## 2023-08-23 RX ADMIN — CLONAZEPAM 1 MG: 1 TABLET ORAL at 09:26

## 2023-08-23 RX ADMIN — QUETIAPINE FUMARATE 50 MG: 50 TABLET, EXTENDED RELEASE ORAL at 21:11

## 2023-08-23 RX ADMIN — ASPIRIN 81 MG: 81 TABLET, COATED ORAL at 09:26

## 2023-08-23 RX ADMIN — INSULIN GLARGINE 60 UNITS: 100 INJECTION, SOLUTION SUBCUTANEOUS at 21:07

## 2023-08-23 RX ADMIN — OXYCODONE HYDROCHLORIDE 10 MG: 5 TABLET ORAL at 12:52

## 2023-08-23 RX ADMIN — SODIUM ZIRCONIUM CYCLOSILICATE 10 G: 10 POWDER, FOR SUSPENSION ORAL at 07:25

## 2023-08-23 RX ADMIN — SODIUM CHLORIDE, PRESERVATIVE FREE 20 ML: 5 INJECTION INTRAVENOUS at 13:40

## 2023-08-23 RX ADMIN — AMIODARONE HYDROCHLORIDE 200 MG: 200 TABLET ORAL at 21:11

## 2023-08-23 RX ADMIN — HYDROCORTISONE SODIUM SUCCINATE 100 MG: 100 INJECTION, POWDER, FOR SOLUTION INTRAMUSCULAR; INTRAVENOUS at 11:15

## 2023-08-23 RX ADMIN — HYDROXYZINE HYDROCHLORIDE 25 MG: 25 TABLET, FILM COATED ORAL at 09:26

## 2023-08-23 RX ADMIN — HYDROXYZINE HYDROCHLORIDE 25 MG: 25 TABLET, FILM COATED ORAL at 16:55

## 2023-08-23 RX ADMIN — PANTOPRAZOLE SODIUM 40 MG: 40 TABLET, DELAYED RELEASE ORAL at 09:26

## 2023-08-23 ASSESSMENT — PAIN SCALES - GENERAL
PAINLEVEL_OUTOF10: 8
PAINLEVEL_OUTOF10: 9
PAINLEVEL_OUTOF10: 8

## 2023-08-23 ASSESSMENT — PAIN DESCRIPTION - LOCATION
LOCATION: BUTTOCKS;BACK
LOCATION: BACK;BUTTOCKS
LOCATION: ABDOMEN;BUTTOCKS;LEG
LOCATION: BACK;BUTTOCKS;SCROTUM
LOCATION: BACK;BUTTOCKS
LOCATION: BUTTOCKS;BACK

## 2023-08-23 ASSESSMENT — PAIN SCALES - WONG BAKER

## 2023-08-23 ASSESSMENT — PAIN - FUNCTIONAL ASSESSMENT
PAIN_FUNCTIONAL_ASSESSMENT: PREVENTS OR INTERFERES SOME ACTIVE ACTIVITIES AND ADLS

## 2023-08-23 ASSESSMENT — PAIN DESCRIPTION - DESCRIPTORS
DESCRIPTORS: ACHING;SPASM;SORE
DESCRIPTORS: ACHING;SHARP;SORE
DESCRIPTORS: SHARP;SORE
DESCRIPTORS: ACHING;SHARP;SORE
DESCRIPTORS: ACHING;SPASM;SORE

## 2023-08-23 ASSESSMENT — PAIN DESCRIPTION - ORIENTATION
ORIENTATION: MID

## 2023-08-23 ASSESSMENT — PAIN DESCRIPTION - PAIN TYPE
TYPE: CHRONIC PAIN
TYPE: CHRONIC PAIN

## 2023-08-23 ASSESSMENT — PAIN DESCRIPTION - FREQUENCY
FREQUENCY: CONTINUOUS
FREQUENCY: CONTINUOUS

## 2023-08-23 NOTE — CONSULTS
He has a chronic Clemons catheter in place for several years. He thinks he may be due for a change. He did tell me he had recent issues with hematuria during previous hospital visit at [facility]. He sees Dr. Chelle Corral. ALLERGIES:  Allergies   Allergen Reactions    No Known Allergies        HOME MEDICATIONS:  Medications Prior to Admission: menthol-zinc oxide (CALMOSEPTINE) 0.44-20.625 % OINT ointment, Apply topically daily Max 30 ml per day. citalopram (CELEXA) 20 MG tablet, Take 1 tablet by mouth daily  fluconazole (DIFLUCAN) 100 MG tablet, Take 1 tablet by mouth daily X 7 days  ipratropium 0.5 mg-albuterol 2.5 mg (DUONEB) 0.5-2.5 (3) MG/3ML SOLN nebulizer solution, Inhale 3 mLs into the lungs every 4 hours  miconazole (MICOTIN) 2 % powder, Apply topically 2 times daily Apply topically 2 times daily. oxybutynin (DITROPAN-XL) 10 MG extended release tablet, Take 1 tablet by mouth daily  oxyCODONE HCl (OXY-IR) 10 MG immediate release tablet, Take 1 tablet by mouth every 4 hours as needed for Pain. Max Daily Amount: 60 mg  SODIUM CHLORIDE PO, Take 1 g by mouth in the morning and 1 g in the evening.  sodium polystyrene (KAYEXALATE) 15 GM/60ML suspension, Take 60 mLs by mouth daily  traZODone (DESYREL) 50 MG tablet, Take 1.5 tablets by mouth nightly  acetaminophen (TYLENOL) 325 MG tablet, Take 2 tablets by mouth every 6 hours as needed for Pain  clonazePAM (KLONOPIN) 0.5 MG tablet, Take 1-2 tablets by mouth See Admin Instructions for 30 days.  Indications: 1mg AM, 0.5mg at lunch, 1mg PM  bumetanide (BUMEX) 1 MG tablet, Take 1 tablet by mouth daily  aspirin 81 MG EC tablet, Take 1 tablet by mouth daily  albuterol sulfate HFA (PROVENTIL;VENTOLIN;PROAIR) 108 (90 Base) MCG/ACT inhaler, Inhale 2 puffs into the lungs every 6 hours as needed for Wheezing  magnesium oxide (MAG-OX) 400 (240 Mg) MG tablet, Take 1 tablet by mouth daily  vitamin D (CHOLECALCIFEROL) 55888 UNIT CAPS, Take 1 capsule by mouth once a week his request.    Zaira Dotson MD  Pulmonary and Critical Care   [phone] [employer]  [phone] Cell

## 2023-08-23 NOTE — PROGRESS NOTES
B/P's running soft per monitor, blood pressure cuff change from large to a long, better fit to patient, blood pressure monitoring changed to q30min. For now. Will continue to monitor.

## 2023-08-23 NOTE — PROGRESS NOTES
Spoke with Orly at [phone] via telephone messaging service for Dr. Tony Workman, notified them of consult for wells management.

## 2023-08-23 NOTE — PLAN OF CARE
Problem: Safety - Adult  Goal: Free from fall injury  8/23/2023 0355 by Meghan Acevedo RN  Outcome: Progressing   No falls/injuries this shift, bed in lowest position, brakes on, bed alarm on, call light in reach, side rails up x2 No falls/injuries this shift, bed in lowest position, brakes on, bed alarm on, call light in reach, side rails up x2   Problem: Pain  Goal: Verbalizes/displays adequate comfort level or baseline comfort level  8/23/2023 0355 by Meghan Acevedo RN  Outcome: Progressing   No new signs/symptoms of pain noted, pain rating < 3 on scale 0-10, pain controlled with medication/repositioning   Problem: Chronic Conditions and Co-morbidities  Goal: Patient's chronic conditions and co-morbidity symptoms are monitored and maintained or improved  8/23/2023 0355 by Meghan Acevedo RN  Outcome: Progressing   Pt maintaining chronic conditions, and skin integrity issues being addressed & showing signs of improvement  Problem: Respiratory - Adult  Goal: Achieves optimal ventilation and oxygenation  8/23/2023 0355 by Meghan Acevedo RN  Outcome: Progressing   Pt continues on O2 therapy, O2 sats>93% this shift

## 2023-08-23 NOTE — PROGRESS NOTES
[facility]  Office: [phone]  Shannon Reid, DO, Harshal Ba, DO, Pb Becerra, DO, Chris Anderson, DO, Tobias Cuba MD, Severo Lopes, MD, Esperanza Rivera MD, Cari Pretty MD,  iSna Romero MD, Brian Rausch MD, Jessica Boggs DO, Vianey Bansal MD,  John Loredo MD, Debbie Rai MD, Geovanna Rossi DO, Arturo Aguirre MD,  Suresh Maloney DO, Eran Castorena MD, Sharda Jara MD, Harmony Benavidez MD, Laura Buchanan MD,  David Pizarro MD, Kathy Davis MD, Phil Lord MD, Des Otero DO, Jorgito Chamberlain MD,  Ankit Astorga MD, Celso Posada, CNP,  Doug Davis, CNP, Loreto Nickerson, CNP, Denny Singh, CNP,  Prateek Paez, [facility], Neftaly Joya, [facility], Elizabeth Farmer, CNP, Enio Patel, CNP, Candido Moore, CNP, Bryan Rangel, CNP, Tadeo Pittman PA-C, Elliott Duffy, CNS, Morales Gonzalez, [facility], Conor Roy, [address] [place] [address]    Progress Note    8/23/2023    12:16 PM    Name:   Niels Mayorga  MRN:     [id number]     Acct:      [de-identified]   Room:   [address] [place] [address] Day:  2  Admit Date:  8/21/2023  8:05 PM    PCP:   Toñito Packer MD  Code Status:  Full Code    Subjective:     69-year-old male with past medical history of COPD on 3 L nasal cannula at baseline, GERD, anxiety, CHF presents emerged department for abnormal lab value at his skilled nurse facility. Patient was found to have hyperkalemia was recommended to come into the ED. Patient denies any chest pain, palpitation, headache, numbness or tingling. Patient does have a history of chronic pain on oxycodone on home. Patient has chronic bilateral stasis limiting his ability to ambulate. Would like urology consult for urinary retention. Medications: Allergies:     Allergies   Allergen Reactions    No Known Allergies        Current Meds:   Scheduled Meds:    docusate sodium  100 mg Oral Daily    pantoprazole  40 mg Oral QA AC 126* 128*   K 6.0*   < > 5.3 5.2 5.1   CL 86*   < > 86* 88* 89*   CO2 32*   < > 33* 33* 33*   GLUCOSE 71   < > 175* 132* 119*   BUN 44*   < > 39* 39* 38*   CREATININE 1.6*   < > 1.5* 1.4* 1.4*   MG 2.5  --   --  2.1  --    ANIONGAP 5*   < > 6* 5* 6*   LABGLOM 49*   < > 53* 58* 58*   CALCIUM 8.9   < > 8.7 8.8 9.0    < > = values in this interval not displayed. Recent Labs     08/22/23  0720 08/22/23  0806 08/22/23  1534 08/22/23  1624 08/22/23  1729 08/22/23 2026 08/23/23  0827 08/23/23  1113   LABA1C 5.2  --   --   --   --   --   --   --    POCGLU  --    < > 137* 152* 151* 161* 124* 184*    < > = values in this interval not displayed. ABG:  Lab Results   Component Value Date/Time    POCPH 7.354 08/22/2023 12:44 PM    PHART 7.330 06/18/2014 12:08 PM    POCPCO2 56.1 08/22/2023 12:44 PM    DZO6AGE 68.0 06/18/2014 12:08 PM    POCPO2 73.2 08/22/2023 12:44 PM    PO2ART 84.0 06/18/2014 12:08 PM    POCHCO3 31.3 08/22/2023 12:44 PM    IRH8RJG 34.9 06/18/2014 12:08 PM    NBEA NOT REPORTED 01/17/2022 11:52 AM    PBEA 4.7 08/22/2023 12:44 PM    KEK7WAF NOT REPORTED 01/17/2022 11:52 AM    ASCR0OUY 93.4 08/22/2023 12:44 PM    D4OVXWRN 96.5 06/18/2014 12:08 PM    FIO2 32.0 08/22/2023 12:44 PM     Lab Results   Component Value Date/Time    SPECIAL 7ML  07/13/2023 10:55 AM    SPECIAL 7ML  07/13/2023 10:55 AM     Lab Results   Component Value Date/Time    CULTURE CULTURE IN PROGRESS 07/13/2023 11:16 AM    CULTURE  07/13/2023 11:16 AM     Several types of bacteria were identified in this specimen. Further ID and susceptibility testing is generally not helpful in this circumstance and has not been performed. Consider recollection if clinically indicated. Please contact the Micro lab ([phone]) if you feel the management ofthis particular situation would be helped by further testing. Radiology:  US RENAL LIMITED    Result Date: 8/22/2023  1. No evidence of hydronephrosis.        Physical Examination:     Head:

## 2023-08-23 NOTE — PROGRESS NOTES
Updated Dr. Wally Collins via [employer] with BMP, Urine chem & Urine ultrasound results. Orders received to call physician with following Sodium and Potassium lab result parameters: call if Na 130 or higher or goes lower than 125. K continue Corrine Saha and call if 6.0 or higher. Hold Lokelma if K 5.0 or less. (See BMP with relfex to Mg order notes, as well as, Misc.  Nursing order notes for parameters)

## 2023-08-23 NOTE — PLAN OF CARE
Problem: Discharge Planning  Goal: Discharge to home or other facility with appropriate resources  Outcome: Progressing  Flowsheets (Taken 8/23/2023 0730)  Discharge to home or other facility with appropriate resources: Identify barriers to discharge with patient and caregiver     Problem: Safety - Adult  Goal: Free from fall injury  Outcome: Progressing     Problem: Pain  Goal: Verbalizes/displays adequate comfort level or baseline comfort level  Outcome: Progressing  Flowsheets (Taken 8/23/2023 0730)  Verbalizes/displays adequate comfort level or baseline comfort level: Encourage patient to monitor pain and request assistance     Problem: Chronic Conditions and Co-morbidities  Goal: Patient's chronic conditions and co-morbidity symptoms are monitored and maintained or improved  Outcome: Progressing  Flowsheets (Taken 8/23/2023 0730)  Care Plan - Patient's Chronic Conditions and Co-Morbidity Symptoms are Monitored and Maintained or Improved: Monitor and assess patient's chronic conditions and comorbid symptoms for stability, deterioration, or improvement     Problem: Respiratory - Adult  Goal: Achieves optimal ventilation and oxygenation  Outcome: Progressing  Flowsheets (Taken 8/23/2023 0730)  Achieves optimal ventilation and oxygenation: Assess for changes in respiratory status     Problem: Skin/Tissue Integrity  Goal: Absence of new skin breakdown  Description: 1. Monitor for areas of redness and/or skin breakdown  2. Assess vascular access sites hourly  3. Every 4-6 hours minimum:  Change oxygen saturation probe site  4. Every 4-6 hours:  If on nasal continuous positive airway pressure, respiratory therapy assess nares and determine need for appliance change or resting period.   Outcome: Progressing   Patient is progressing towards goals

## 2023-08-24 VITALS
BODY MASS INDEX: 44.1 KG/M2 | OXYGEN SATURATION: 95 % | TEMPERATURE: 97.5 F | HEART RATE: 93 BPM | HEIGHT: 71 IN | SYSTOLIC BLOOD PRESSURE: 126 MMHG | WEIGHT: 315 LBS | DIASTOLIC BLOOD PRESSURE: 69 MMHG | RESPIRATION RATE: 24 BRPM

## 2023-08-24 LAB
ANION GAP SERPL CALCULATED.3IONS-SCNC: 5 MMOL/L (ref 9–17)
ANION GAP SERPL CALCULATED.3IONS-SCNC: 6 MMOL/L (ref 9–17)
BUN SERPL-MCNC: 38 MG/DL (ref 6–20)
BUN SERPL-MCNC: 39 MG/DL (ref 6–20)
CALCIUM SERPL-MCNC: 8.9 MG/DL (ref 8.6–10.4)
CALCIUM SERPL-MCNC: 9.1 MG/DL (ref 8.6–10.4)
CHLORIDE SERPL-SCNC: 90 MMOL/L (ref 98–107)
CHLORIDE SERPL-SCNC: 91 MMOL/L (ref 98–107)
CO2 SERPL-SCNC: 37 MMOL/L (ref 20–31)
CO2 SERPL-SCNC: 37 MMOL/L (ref 20–31)
CREAT SERPL-MCNC: 1.3 MG/DL (ref 0.7–1.2)
CREAT SERPL-MCNC: 1.3 MG/DL (ref 0.7–1.2)
GFR SERPL CREATININE-BSD FRML MDRD: >60 ML/MIN/1.73M2
GFR SERPL CREATININE-BSD FRML MDRD: >60 ML/MIN/1.73M2
GLUCOSE BLD-MCNC: 55 MG/DL (ref 75–110)
GLUCOSE BLD-MCNC: 68 MG/DL (ref 75–110)
GLUCOSE BLD-MCNC: 93 MG/DL (ref 75–110)
GLUCOSE SERPL-MCNC: 53 MG/DL (ref 70–99)
GLUCOSE SERPL-MCNC: 93 MG/DL (ref 70–99)
MAGNESIUM SERPL-MCNC: 2 MG/DL (ref 1.6–2.6)
MICROORGANISM SPEC CULT: NORMAL
POTASSIUM SERPL-SCNC: 3.7 MMOL/L (ref 3.7–5.3)
POTASSIUM SERPL-SCNC: 3.9 MMOL/L (ref 3.7–5.3)
SODIUM SERPL-SCNC: 132 MMOL/L (ref 135–144)
SODIUM SERPL-SCNC: 134 MMOL/L (ref 135–144)
SPECIMEN DESCRIPTION: NORMAL

## 2023-08-24 PROCEDURE — 99212 OFFICE O/P EST SF 10 MIN: CPT

## 2023-08-24 PROCEDURE — 94761 N-INVAS EAR/PLS OXIMETRY MLT: CPT

## 2023-08-24 PROCEDURE — 2580000003 HC RX 258: Performed by: NURSE PRACTITIONER

## 2023-08-24 PROCEDURE — 36415 COLL VENOUS BLD VENIPUNCTURE: CPT

## 2023-08-24 PROCEDURE — 99239 HOSP IP/OBS DSCHRG MGMT >30: CPT | Performed by: STUDENT IN AN ORGANIZED HEALTH CARE EDUCATION/TRAINING PROGRAM

## 2023-08-24 PROCEDURE — 82947 ASSAY GLUCOSE BLOOD QUANT: CPT

## 2023-08-24 PROCEDURE — 6370000000 HC RX 637 (ALT 250 FOR IP): Performed by: INTERNAL MEDICINE

## 2023-08-24 PROCEDURE — 2700000000 HC OXYGEN THERAPY PER DAY

## 2023-08-24 PROCEDURE — 83735 ASSAY OF MAGNESIUM: CPT

## 2023-08-24 PROCEDURE — 6370000000 HC RX 637 (ALT 250 FOR IP): Performed by: STUDENT IN AN ORGANIZED HEALTH CARE EDUCATION/TRAINING PROGRAM

## 2023-08-24 PROCEDURE — 80048 BASIC METABOLIC PNL TOTAL CA: CPT

## 2023-08-24 PROCEDURE — 6360000002 HC RX W HCPCS: Performed by: NURSE PRACTITIONER

## 2023-08-24 RX ORDER — BUMETANIDE 1 MG/1
1 TABLET ORAL 2 TIMES DAILY
Qty: 30 TABLET | Refills: 3 | Status: SHIPPED | OUTPATIENT
Start: 2023-08-24

## 2023-08-24 RX ADMIN — AMIODARONE HYDROCHLORIDE 200 MG: 200 TABLET ORAL at 09:13

## 2023-08-24 RX ADMIN — OXYCODONE HYDROCHLORIDE 10 MG: 5 TABLET ORAL at 03:40

## 2023-08-24 RX ADMIN — ENOXAPARIN SODIUM 40 MG: 100 INJECTION SUBCUTANEOUS at 09:13

## 2023-08-24 RX ADMIN — SODIUM CHLORIDE, PRESERVATIVE FREE 10 ML: 5 INJECTION INTRAVENOUS at 09:14

## 2023-08-24 RX ADMIN — OXYCODONE HYDROCHLORIDE 10 MG: 5 TABLET ORAL at 10:02

## 2023-08-24 RX ADMIN — BUMETANIDE 1 MG: 1 TABLET ORAL at 09:11

## 2023-08-24 RX ADMIN — SODIUM CHLORIDE, PRESERVATIVE FREE 10 ML: 5 INJECTION INTRAVENOUS at 03:42

## 2023-08-24 RX ADMIN — PANTOPRAZOLE SODIUM 40 MG: 40 TABLET, DELAYED RELEASE ORAL at 09:12

## 2023-08-24 RX ADMIN — CLONAZEPAM 1 MG: 1 TABLET ORAL at 09:12

## 2023-08-24 RX ADMIN — HYDROXYZINE HYDROCHLORIDE 25 MG: 25 TABLET, FILM COATED ORAL at 12:58

## 2023-08-24 RX ADMIN — MIDODRINE HYDROCHLORIDE 10 MG: 5 TABLET ORAL at 12:57

## 2023-08-24 RX ADMIN — ASPIRIN 81 MG: 81 TABLET, COATED ORAL at 09:11

## 2023-08-24 RX ADMIN — INSULIN GLARGINE 60 UNITS: 100 INJECTION, SOLUTION SUBCUTANEOUS at 09:12

## 2023-08-24 RX ADMIN — HYDROXYZINE HYDROCHLORIDE 25 MG: 25 TABLET, FILM COATED ORAL at 05:59

## 2023-08-24 RX ADMIN — MIDODRINE HYDROCHLORIDE 10 MG: 5 TABLET ORAL at 09:11

## 2023-08-24 RX ADMIN — OXYBUTYNIN CHLORIDE 10 MG: 5 TABLET, EXTENDED RELEASE ORAL at 09:11

## 2023-08-24 RX ADMIN — Medication 400 MG: at 09:11

## 2023-08-24 RX ADMIN — FERROUS SULFATE TAB EC 325 MG (65 MG FE EQUIVALENT) 325 MG: 325 (65 FE) TABLET DELAYED RESPONSE at 09:12

## 2023-08-24 RX ADMIN — TAMSULOSIN HYDROCHLORIDE 0.4 MG: 0.4 CAPSULE ORAL at 09:11

## 2023-08-24 ASSESSMENT — PAIN SCALES - WONG BAKER
WONGBAKER_NUMERICALRESPONSE: 0
WONGBAKER_NUMERICALRESPONSE: 2
WONGBAKER_NUMERICALRESPONSE: 0

## 2023-08-24 ASSESSMENT — PAIN SCALES - GENERAL
PAINLEVEL_OUTOF10: 8
PAINLEVEL_OUTOF10: 8
PAINLEVEL_OUTOF10: 6
PAINLEVEL_OUTOF10: 9

## 2023-08-24 ASSESSMENT — PAIN DESCRIPTION - LOCATION: LOCATION: ABDOMEN;BUTTOCKS;LEG

## 2023-08-24 ASSESSMENT — PAIN DESCRIPTION - DESCRIPTORS: DESCRIPTORS: SHARP;SORE

## 2023-08-24 ASSESSMENT — PAIN - FUNCTIONAL ASSESSMENT: PAIN_FUNCTIONAL_ASSESSMENT: PREVENTS OR INTERFERES SOME ACTIVE ACTIVITIES AND ADLS

## 2023-08-24 ASSESSMENT — PAIN DESCRIPTION - ORIENTATION: ORIENTATION: MID

## 2023-08-24 NOTE — PROGRESS NOTES
[facility] Wound Ostomy  Nurse  Consult Note       NAME:  Ankit Severino  MEDICAL RECORD NUMBER:  [id number]  AGE: 60 y.o. GENDER: male  : 1965  TODAY'S DATE:  2023    Subjective   Reason for [address] [facility] Nurse Evaluation and Assessment: Multiple wounds. history of Incontinence Associated Dermatitis, irritant contact dermatitis due to  fecal and urinary incontinence and Intertrigo to abdominal pannus. Ankit Severino is a 60 y.o. male referred by:   [x] Physician  [] Nursing  [] Other:       Assessment   Fantasma Risk Score: Fantasma Scale Score: 14    Patient Active Problem List   Diagnosis Code    Alcoholic cirrhosis of liver ([address]), sober since  K70.30    Peripheral edema R60.9    Bipolar disorder ([address]) F31.9    Type 2 diabetes mellitus with diabetic neuropathy, with long-term current use of insulin ([address]) E11.40, Z79.4    Essential hypertension, currently hypotensive I10    Dyslipidemia E78.5    Weakness R53.1    FABI (obstructive sleep apnea) G47.33    Primary osteoarthritis of right knee M17.11    Type 2 diabetes mellitus with diabetic neuropathy (HCC) E11.40    Acquired hypothyroidism E03.9    Urine retention R33.9    Anemia D64.9    Slow transit constipation K59.01    Iron deficiency anemia due to chronic blood loss D50.0    Gastroesophageal reflux disease without esophagitis K21.9    LEONARDA (acute kidney injury) ([address]) N17.9    Secondary esophageal varices without bleeding (HCC) I85.10    Portal hypertension (HCC) K76.6    Obesity, morbid, BMI 50 or higher (HCC) E66.01    Venous stasis dermatitis of both lower extremities I87.2    Cellulitis of perineum L03.315    Chronic indwelling Clemons catheter Z97.8    Anxiety and depression F41.9, F32. A    Back pain, chronic M54.9, G89.29    BPH (benign prostatic hyperplasia) N40.0    Chronic diastolic congestive heart failure (HCC) I50.32    Cirrhosis (HCC) K74.60    Diabetes mellitus (HCC) E11.9    GERD (gastroesophageal reflux disease) K21.9    Hepatitis K75.9 Odor None 08/23/23 2000   Number of days: 2       Wound 08/22/23 Scrotum open areas scrotum, inner thighs, groin (Active)   Dressing Status Other (Comment) 08/24/23 0515   Wound Cleansed Other (Comment) 08/24/23 0515   Dressing/Treatment Protective barrier 08/24/23 0515   Wound Assessment Pink/red 08/24/23 0515   Drainage Amount Scant (moist but unmeasurable) 08/24/23 0515   Odor None 08/24/23 0515   Number of days: 2       Wound 08/22/23 Knee Left;Posterior open area (Active)   Wound Cleansed Other (Comment) 08/24/23 0657   Dressing/Treatment Protective barrier 08/24/23 0657   Wound Assessment Pink/red 08/24/23 0657   Drainage Amount Scant (moist but unmeasurable) 08/24/23 0657   Drainage Description Serosanguinous 08/24/23 0657   Number of days: 2       Wound 08/22/23 Knee Posterior;Right open area (Active)   Wound Cleansed Other (Comment) 08/24/23 0515   Dressing/Treatment Protective barrier 08/24/23 0515   Wound Assessment Pink/red 08/24/23 0515   Drainage Amount Scant (moist but unmeasurable) 08/24/23 0515   Drainage Description Serosanguinous 08/24/23 0515   Number of days: 2       Wound 08/22/23 Brachial Distal;Left open area (Active)   Wound Cleansed Other (Comment) 08/24/23 0515   Wound Assessment Pink/red 08/24/23 0515   Drainage Amount Scant (moist but unmeasurable) 08/24/23 0515   Odor None 08/24/23 0515   Number of days: 2           Response to treatment:  With complaints of pain. Skin is much improved from prior admission evidenced by wound photos. Buttock skin is intact with dry flaky patches; Abdominal , groin, abdominal  and knee folds continue with some open areas due to excessive moisture and friction. Plan   Plan of Care:   Continue with established plan of care in place  to include Triad cream to areas of denudation and use of moisture wicking textiles to intertriginous folds. Specialty Bed Required : Yes   [facility] Stage 4 mattress is in use.   At time of assessment the sides of the

## 2023-08-24 NOTE — CARE COORDINATION
Transitional Planning    Spoke with Patrick Dodge- [facility], they can accept patient back today. Transport requested.     # for report [phone]      Transport ETA 1400, updated facility

## 2023-08-24 NOTE — PLAN OF CARE
Problem: Respiratory - Adult  Goal: Achieves optimal ventilation and oxygenation  8/24/2023 0214 by Rosie Fierro RCP  Outcome: Progressing  Flowsheets (Taken 8/24/2023 0214)  Achieves optimal ventilation and oxygenation:   Assess for changes in respiratory status   Assess for changes in mentation and behavior   Oxygen supplementation based on oxygen saturation or arterial blood gases   Assess and instruct to report shortness of breath or any respiratory difficulty   Respiratory therapy support as indicated

## 2023-08-24 NOTE — PLAN OF CARE
Problem: Safety - Adult  Goal: Free from fall injury  8/24/2023 0117 by Nidhi Vargas RN  Outcome: Progressing   No falls/injuries this shift, bed in lowest position, brakes on, bed alarm on, call light in reach, side rails up x2   Problem: Pain  Goal: Verbalizes/displays adequate comfort level or baseline comfort level  8/24/2023 0117 by Nidhi Vargas RN  Outcome: Progressing   No new signs/symptoms of pain noted, pain rating < 3 on scale 0-10, pain controlled with medication/repositioning   Problem: Respiratory - Adult  Goal: Achieves optimal ventilation and oxygenation  8/24/2023 0117 by Nidhi Vargas RN  Outcome: Progressing  O2 sats>93% on O2  Problem: Skin/Tissue Integrity  Goal: Absence of new skin breakdown  Description: 1. Monitor for areas of redness and/or skin breakdown  2. Assess vascular access sites hourly  3. Every 4-6 hours minimum:  Change oxygen saturation probe site  4. Every 4-6 hours:  If on nasal continuous positive airway pressure, respiratory therapy assess nares and determine need for appliance change or resting period.   8/24/2023 0117 by Nidhi Vargas, RN  Outcome: Progressing  No new skin breakdown noted, no new signs/symptoms of infection, continue to monitor labwork including WBC, medications administered per physician orders

## 2023-08-24 NOTE — DISCHARGE SUMMARY
[facility]  Office: [phone]  Evan Parker DO, Phil Anderson DO, Hu Wang MD, Sindi Roberto MD, Romelia Rice MD, Liyah Zamudio MD,  Estephanie Thomas MD, Isaias Wayne MD, Shayy aCruso DO, Rosa M Alatorre MD,  Krunal Aleman DO, Aguila Morelos MD, Neo Reilly MD, Yadira Aguilar, DO, Moncho Barnhart MD,  Becca Campos DO, Adrien Monk MD, Olga Varma MD, Hamilton Swenson MD, Korey Camargo MD,  Bharati Cason MD, Claudia Delgado MD, Tash Ma MD, Rabon Kocher, DO, Yayo Holliday MD,  Joaquín Mazariegos MD, Bronwyn Whittington, CNP,  Selwyn Smith, CNP, Shelby Gustafson, CNP, Richelle Montoya, CNP,  Gordy Powell, [facility], Jade Dotson, CNP, Antonio Valdez, CNP, Alba Patel, CNP, Azra Monroe, CNP, Portia Milligan, CNP, TR GarciaC, Glenna Koehler, CNS, Socorro Lobato, [facility], Ana Wong, [address] [place] [address]    Discharge Summary     Patient ID: Gladis Wesley  :  1965   MRN: [id number]     ACCOUNT:  [de-identified]   Patient's PCP: Jenny Jason MD  Admit Date: 2023   Discharge Date: 2023  Length of Stay: 3  Code Status:  Full Code  Admitting Physician: No admitting provider for patient encounter. Discharge Physician: Dalila Lara MD     Active Discharge Diagnoses:     Hospital Problem Lists:  Principal Problem:    Hyperkalemia  Active Problems:    LEONARDA (acute kidney injury) ([address])    Hypertension, essential  Resolved Problems:    * No resolved hospital problems. *      Admission Condition:  fair     Discharged Condition: poor    Hospital Stay:     Hospital Course:  Gladis Wesley is a 60 y.o. male who was admitted for the management of Hyperkalemia , presented to ER with Abnormal Lab (Facility reports high potassium)    Patient discharged with indwelling Clemons catheter with instruction to follow-up with urology.     Hyperkalemia-likely related to underlying renal [address] [place] [address]  [phone]             Requiring Further Evaluation/Follow Up POST HOSPITALIZATION/Incidental Findings:     Diet: cardiac diet    Activity: As tolerated    Instructions to Patient: check BMP Mondays and Thursdays for 2 weeks and fax results to our office [phone]    Discharge Medications:      Medication List        START taking these medications      sodium zirconium cyclosilicate 10 g Pack oral suspension  Commonly known as: Lokelma  Take 1 packet by mouth daily            CHANGE how you take these medications      bumetanide 1 MG tablet  Commonly known as: BUMEX  Take 1 tablet by mouth 2 times daily  What changed: when to take this            CONTINUE taking these medications      acetaminophen 325 MG tablet  Commonly known as: TYLENOL     albuterol sulfate  (90 Base) MCG/ACT inhaler  Commonly known as: PROVENTIL;VENTOLIN;PROAIR     amiodarone 200 MG tablet  Commonly known as: CORDARONE  Take 1 tablet by mouth 2 times daily     aspirin 81 MG EC tablet     Basaglar KwikPen 100 UNIT/ML injection pen  Generic drug: insulin glargine     citalopram 20 MG tablet  Commonly known as: CELEXA     clobetasol 0.05 % ointment  Commonly known as: TEMOVATE     clonazePAM 0.5 MG tablet  Commonly known as: KLONOPIN  Take 1-2 tablets by mouth See Admin Instructions for 30 days.  Indications: 1mg AM, 0.5mg at lunch, 1mg PM     docusate sodium 100 MG capsule  Commonly known as: COLACE     esomeprazole 40 MG delayed release capsule  Commonly known as: NEXIUM     ferrous sulfate 325 (65 Fe) MG EC tablet  Commonly known as: FE TABS 325     hydrocortisone 2.5 % cream     hydrOXYzine HCl 25 MG tablet  Commonly known as: ATARAX     ipratropium 0.5 mg-albuterol 2.5 mg 0.5-2.5 (3) MG/3ML Soln nebulizer solution  Commonly known as: DUONEB     ketoconazole 2 % shampoo  Commonly known as: NIZORAL     levothyroxine 100 MCG tablet  Commonly known as: SYNTHROID     magnesium oxide 400 (240 Mg) MG tablet  Commonly

## 2023-08-24 NOTE — DISCHARGE INSTR - COC
Continuity of Care Form    Patient Name: Chrissy Paniagua   :  1965  MRN:  [id number]    Admit date:  2023  Discharge date:  2023    Code Status Order: Full Code   Advance Directives:     Admitting Physician:  No admitting provider for patient encounter.   PCP: Sarah Peralta MD    Discharging Nurse: [place] Unit/Room#: 340/340-01  Discharging Unit Phone Number: [phone]    Emergency Contact:   Extended Emergency Contact Information  Primary Emergency Contact: Hi Kelly of [address] [place] Phone: [phone]  Relation: Child  Secondary Emergency Contact: Mark Brandon of [address] [place] Phone: [phone]  Work Phone: [phone]  Mobile Phone: [phone]  Relation: Child    Past Surgical History:  Past Surgical History:   Procedure Laterality Date    ABDOMEN SURGERY      hernia repair x4 (ventral)    COLONOSCOPY          COLONOSCOPY N/A 2023    COLONOSCOPY DIAGNOSTIC performed by Elio Morgan MD at [address] [place] [address]  2023    COLONOSCOPY N/A 2023    COLONOSCOPY DIAGNOSTIC performed by Elio Morgan MD at [address] [place] [address]  2018    CYSTOSCOPY  2019    CYSTOSCOPY N/A 2019    CYSTOSCOPY DILATION performed by Timmy Curtis MD at [address] [place] [address] N/A 2019    CYSTOSCOPY/ DILATION NICOLE CATHETER EXCHANGE performed by Timmy Curtis MD at [address] [place] [address] N/A 2022    CYSTOSCOPY, REMOVAL OF SMALL BLADDER STONE AND BLADDER IRRIGATION performed by iTmmy Curtis MD at [address] [place] [address], [address] [facility]  2018    repair and reduction of recurrent incarcerated incisional hernia with mesh    ME CYSTOURETHROSCOPY N/A 2018    CYSTOSCOPY Brenda Sharp performed by Timmy Curtis MD at [address] EGD TRANSORAL BIOPSY SINGLE/MULTIPLE N/A 2017    EGD BIOPSY performed by Mt Shell MD at [address] I&D BELOW

## 2023-08-25 ENCOUNTER — HOSPITAL ENCOUNTER (INPATIENT)
Age: 58
LOS: 5 days | Discharge: NURSING, REHAB OR LONG-TERM CARE | DRG: 637 | End: 2023-08-30
Attending: EMERGENCY MEDICINE | Admitting: STUDENT IN AN ORGANIZED HEALTH CARE EDUCATION/TRAINING PROGRAM
Payer: MEDICARE

## 2023-08-25 ENCOUNTER — APPOINTMENT (OUTPATIENT)
Dept: GENERAL RADIOLOGY | Age: 58
DRG: 637 | End: 2023-08-25
Payer: MEDICARE

## 2023-08-25 DIAGNOSIS — T07.XXXA WOUNDS, MULTIPLE: ICD-10-CM

## 2023-08-25 DIAGNOSIS — F31.75 BIPOLAR DISORDER, IN PARTIAL REMISSION, MOST RECENT EPISODE DEPRESSED (HCC): ICD-10-CM

## 2023-08-25 DIAGNOSIS — N39.0 ACUTE UTI: ICD-10-CM

## 2023-08-25 DIAGNOSIS — E16.2 HYPOGLYCEMIA: Primary | ICD-10-CM

## 2023-08-25 LAB
ALBUMIN SERPL-MCNC: 3.1 G/DL (ref 3.5–5.2)
ALBUMIN/GLOB SERPL: 1 {RATIO} (ref 1–2.5)
ALP SERPL-CCNC: 177 U/L (ref 40–129)
ALT SERPL-CCNC: 13 U/L (ref 5–41)
ANION GAP SERPL CALCULATED.3IONS-SCNC: 5 MMOL/L (ref 9–17)
AST SERPL-CCNC: 15 U/L
BACTERIA URNS QL MICRO: ABNORMAL
BASOPHILS # BLD: 0.09 K/UL (ref 0–0.2)
BASOPHILS NFR BLD: 1 % (ref 0–2)
BILIRUB SERPL-MCNC: 0.3 MG/DL (ref 0.3–1.2)
BILIRUB UR QL STRIP: NEGATIVE
BUN SERPL-MCNC: 38 MG/DL (ref 6–20)
CALCIUM SERPL-MCNC: 9.1 MG/DL (ref 8.6–10.4)
CHARACTER UR: ABNORMAL
CHLORIDE SERPL-SCNC: 92 MMOL/L (ref 98–107)
CHP ED QC CHECK: 91
CLARITY UR: CLEAR
CO2 SERPL-SCNC: 38 MMOL/L (ref 20–31)
COLOR UR: YELLOW
CREAT SERPL-MCNC: 1.4 MG/DL (ref 0.7–1.2)
EOSINOPHIL # BLD: 0.09 K/UL (ref 0–0.4)
EOSINOPHILS RELATIVE PERCENT: 1 % (ref 1–4)
EPI CELLS #/AREA URNS HPF: ABNORMAL /HPF (ref 0–5)
ERYTHROCYTE [DISTWIDTH] IN BLOOD BY AUTOMATED COUNT: 17.6 % (ref 12.5–15.4)
GFR SERPL CREATININE-BSD FRML MDRD: 58 ML/MIN/1.73M2
GLUCOSE BLD-MCNC: 103 MG/DL (ref 75–110)
GLUCOSE BLD-MCNC: 105 MG/DL (ref 75–110)
GLUCOSE BLD-MCNC: 19 MG/DL (ref 75–110)
GLUCOSE BLD-MCNC: 49 MG/DL (ref 75–110)
GLUCOSE BLD-MCNC: 85 MG/DL (ref 75–110)
GLUCOSE BLD-MCNC: 91 MG/DL (ref 75–110)
GLUCOSE BLD-MCNC: 92 MG/DL (ref 75–110)
GLUCOSE SERPL-MCNC: 95 MG/DL (ref 70–99)
GLUCOSE UR STRIP-MCNC: NEGATIVE MG/DL
HCT VFR BLD AUTO: 30.1 % (ref 41–53)
HGB BLD-MCNC: 9.2 G/DL (ref 13.5–17.5)
HGB UR QL STRIP.AUTO: ABNORMAL
KETONES UR STRIP-MCNC: NEGATIVE MG/DL
LACTATE BLDV-SCNC: 1.1 MMOL/L (ref 0.5–2.2)
LEUKOCYTE ESTERASE UR QL STRIP: ABNORMAL
LYMPHOCYTES NFR BLD: 0.34 K/UL (ref 1–4.8)
LYMPHOCYTES RELATIVE PERCENT: 4 % (ref 24–44)
MCH RBC QN AUTO: 26.2 PG (ref 26–34)
MCHC RBC AUTO-ENTMCNC: 30.5 G/DL (ref 31–37)
MCV RBC AUTO: 85.8 FL (ref 80–100)
MONOCYTES NFR BLD: 0.52 K/UL (ref 0.1–0.8)
MONOCYTES NFR BLD: 6 % (ref 1–7)
MORPHOLOGY: NORMAL
NEUTROPHILS NFR BLD: 88 % (ref 36–66)
NEUTS SEG NFR BLD: 7.56 K/UL (ref 1.8–7.7)
NITRITE UR QL STRIP: NEGATIVE
PH UR STRIP: 6.5 [PH] (ref 5–8)
PLATELET # BLD AUTO: 163 K/UL (ref 140–450)
PMV BLD AUTO: 7 FL (ref 6–12)
POTASSIUM SERPL-SCNC: 3.7 MMOL/L (ref 3.7–5.3)
PROT SERPL-MCNC: 6.3 G/DL (ref 6.4–8.3)
PROT UR STRIP-MCNC: ABNORMAL MG/DL
RBC # BLD AUTO: 3.51 M/UL (ref 4.5–5.9)
RBC #/AREA URNS HPF: ABNORMAL /HPF (ref 0–2)
SODIUM SERPL-SCNC: 135 MMOL/L (ref 135–144)
SP GR UR STRIP: 1.01 (ref 1–1.03)
TSH SERPL DL<=0.05 MIU/L-ACNC: 2.74 UIU/ML (ref 0.3–5)
UROBILINOGEN UR STRIP-ACNC: NORMAL EU/DL (ref 0–1)
WBC #/AREA URNS HPF: ABNORMAL /HPF (ref 0–5)
WBC OTHER # BLD: 8.6 K/UL (ref 3.5–11)

## 2023-08-25 PROCEDURE — 94640 AIRWAY INHALATION TREATMENT: CPT

## 2023-08-25 PROCEDURE — 6370000000 HC RX 637 (ALT 250 FOR IP): Performed by: STUDENT IN AN ORGANIZED HEALTH CARE EDUCATION/TRAINING PROGRAM

## 2023-08-25 PROCEDURE — 94760 N-INVAS EAR/PLS OXIMETRY 1: CPT

## 2023-08-25 PROCEDURE — 2580000003 HC RX 258: Performed by: NURSE PRACTITIONER

## 2023-08-25 PROCEDURE — 36415 COLL VENOUS BLD VENIPUNCTURE: CPT

## 2023-08-25 PROCEDURE — 2580000003 HC RX 258: Performed by: STUDENT IN AN ORGANIZED HEALTH CARE EDUCATION/TRAINING PROGRAM

## 2023-08-25 PROCEDURE — 81001 URINALYSIS AUTO W/SCOPE: CPT

## 2023-08-25 PROCEDURE — 2060000000 HC ICU INTERMEDIATE R&B

## 2023-08-25 PROCEDURE — 71045 X-RAY EXAM CHEST 1 VIEW: CPT

## 2023-08-25 PROCEDURE — 82947 ASSAY GLUCOSE BLOOD QUANT: CPT

## 2023-08-25 PROCEDURE — 83605 ASSAY OF LACTIC ACID: CPT

## 2023-08-25 PROCEDURE — 85025 COMPLETE CBC W/AUTO DIFF WBC: CPT

## 2023-08-25 PROCEDURE — 6360000002 HC RX W HCPCS: Performed by: STUDENT IN AN ORGANIZED HEALTH CARE EDUCATION/TRAINING PROGRAM

## 2023-08-25 PROCEDURE — 6360000002 HC RX W HCPCS: Performed by: NURSE PRACTITIONER

## 2023-08-25 PROCEDURE — 84443 ASSAY THYROID STIM HORMONE: CPT

## 2023-08-25 PROCEDURE — 80053 COMPREHEN METABOLIC PANEL: CPT

## 2023-08-25 PROCEDURE — 2580000003 HC RX 258

## 2023-08-25 PROCEDURE — 2500000003 HC RX 250 WO HCPCS

## 2023-08-25 PROCEDURE — 6370000000 HC RX 637 (ALT 250 FOR IP): Performed by: NURSE PRACTITIONER

## 2023-08-25 PROCEDURE — 99285 EMERGENCY DEPT VISIT HI MDM: CPT

## 2023-08-25 RX ORDER — GLUCAGON 1 MG/ML
1 KIT INJECTION PRN
Status: DISCONTINUED | OUTPATIENT
Start: 2023-08-25 | End: 2023-08-30 | Stop reason: HOSPADM

## 2023-08-25 RX ORDER — ASPIRIN 81 MG/1
81 TABLET ORAL DAILY
Status: DISCONTINUED | OUTPATIENT
Start: 2023-08-26 | End: 2023-08-30 | Stop reason: HOSPADM

## 2023-08-25 RX ORDER — INSULIN LISPRO 100 [IU]/ML
0-4 INJECTION, SOLUTION INTRAVENOUS; SUBCUTANEOUS NIGHTLY
Status: DISCONTINUED | OUTPATIENT
Start: 2023-08-25 | End: 2023-08-27

## 2023-08-25 RX ORDER — AMIODARONE HYDROCHLORIDE 200 MG/1
200 TABLET ORAL 2 TIMES DAILY
Status: DISCONTINUED | OUTPATIENT
Start: 2023-08-25 | End: 2023-08-30 | Stop reason: HOSPADM

## 2023-08-25 RX ORDER — INSULIN LISPRO 100 [IU]/ML
0-4 INJECTION, SOLUTION INTRAVENOUS; SUBCUTANEOUS
Status: DISCONTINUED | OUTPATIENT
Start: 2023-08-25 | End: 2023-08-27

## 2023-08-25 RX ORDER — OXYCODONE HYDROCHLORIDE 5 MG/1
10 TABLET ORAL EVERY 4 HOURS PRN
Status: DISCONTINUED | OUTPATIENT
Start: 2023-08-25 | End: 2023-08-30 | Stop reason: HOSPADM

## 2023-08-25 RX ORDER — HYDROXYZINE HYDROCHLORIDE 25 MG/1
25 TABLET, FILM COATED ORAL EVERY 6 HOURS PRN
Status: DISCONTINUED | OUTPATIENT
Start: 2023-08-25 | End: 2023-08-30 | Stop reason: HOSPADM

## 2023-08-25 RX ORDER — TAMSULOSIN HYDROCHLORIDE 0.4 MG/1
0.4 CAPSULE ORAL DAILY
Status: DISCONTINUED | OUTPATIENT
Start: 2023-08-25 | End: 2023-08-30 | Stop reason: HOSPADM

## 2023-08-25 RX ORDER — ACETAMINOPHEN 650 MG/1
650 SUPPOSITORY RECTAL EVERY 6 HOURS PRN
Status: DISCONTINUED | OUTPATIENT
Start: 2023-08-25 | End: 2023-08-30 | Stop reason: HOSPADM

## 2023-08-25 RX ORDER — DEXTROSE MONOHYDRATE 100 MG/ML
INJECTION, SOLUTION INTRAVENOUS
Status: COMPLETED
Start: 2023-08-25 | End: 2023-08-25

## 2023-08-25 RX ORDER — METOPROLOL TARTRATE 50 MG/1
50 TABLET, FILM COATED ORAL 2 TIMES DAILY
Status: DISCONTINUED | OUTPATIENT
Start: 2023-08-25 | End: 2023-08-30 | Stop reason: HOSPADM

## 2023-08-25 RX ORDER — ENOXAPARIN SODIUM 100 MG/ML
40 INJECTION SUBCUTANEOUS 2 TIMES DAILY
Status: DISCONTINUED | OUTPATIENT
Start: 2023-08-25 | End: 2023-08-30 | Stop reason: HOSPADM

## 2023-08-25 RX ORDER — CLONAZEPAM 1 MG/1
1 TABLET ORAL EVERY 12 HOURS PRN
Status: DISCONTINUED | OUTPATIENT
Start: 2023-08-25 | End: 2023-08-27

## 2023-08-25 RX ORDER — DEXTROSE MONOHYDRATE 25 G/50ML
INJECTION, SOLUTION INTRAVENOUS
Status: COMPLETED
Start: 2023-08-25 | End: 2023-08-25

## 2023-08-25 RX ORDER — SODIUM CHLORIDE 9 MG/ML
INJECTION, SOLUTION INTRAVENOUS PRN
Status: DISCONTINUED | OUTPATIENT
Start: 2023-08-25 | End: 2023-08-30 | Stop reason: HOSPADM

## 2023-08-25 RX ORDER — ACETAMINOPHEN 325 MG/1
650 TABLET ORAL EVERY 6 HOURS PRN
Status: DISCONTINUED | OUTPATIENT
Start: 2023-08-25 | End: 2023-08-30 | Stop reason: HOSPADM

## 2023-08-25 RX ORDER — INSULIN GLARGINE 100 [IU]/ML
20 INJECTION, SOLUTION SUBCUTANEOUS 2 TIMES DAILY
Status: DISCONTINUED | OUTPATIENT
Start: 2023-08-25 | End: 2023-08-26

## 2023-08-25 RX ORDER — DEXTROSE AND SODIUM CHLORIDE 5; .45 G/100ML; G/100ML
INJECTION, SOLUTION INTRAVENOUS CONTINUOUS
Status: ACTIVE | OUTPATIENT
Start: 2023-08-25 | End: 2023-08-26

## 2023-08-25 RX ORDER — IPRATROPIUM BROMIDE AND ALBUTEROL SULFATE 2.5; .5 MG/3ML; MG/3ML
1 SOLUTION RESPIRATORY (INHALATION)
Status: DISCONTINUED | OUTPATIENT
Start: 2023-08-25 | End: 2023-08-27

## 2023-08-25 RX ORDER — BUMETANIDE 1 MG/1
1 TABLET ORAL 2 TIMES DAILY
Status: DISCONTINUED | OUTPATIENT
Start: 2023-08-25 | End: 2023-08-29

## 2023-08-25 RX ORDER — DEXTROSE MONOHYDRATE 25 G/50ML
25 INJECTION, SOLUTION INTRAVENOUS ONCE
Status: DISCONTINUED | OUTPATIENT
Start: 2023-08-25 | End: 2023-08-30 | Stop reason: HOSPADM

## 2023-08-25 RX ORDER — LEVOTHYROXINE SODIUM 0.1 MG/1
200 TABLET ORAL DAILY
Status: DISCONTINUED | OUTPATIENT
Start: 2023-08-26 | End: 2023-08-26

## 2023-08-25 RX ORDER — SODIUM CHLORIDE 0.9 % (FLUSH) 0.9 %
5-40 SYRINGE (ML) INJECTION PRN
Status: DISCONTINUED | OUTPATIENT
Start: 2023-08-25 | End: 2023-08-30 | Stop reason: HOSPADM

## 2023-08-25 RX ORDER — TRAZODONE HYDROCHLORIDE 50 MG/1
75 TABLET ORAL NIGHTLY
Status: DISCONTINUED | OUTPATIENT
Start: 2023-08-25 | End: 2023-08-30 | Stop reason: HOSPADM

## 2023-08-25 RX ORDER — ALBUTEROL SULFATE 90 UG/1
2 AEROSOL, METERED RESPIRATORY (INHALATION) EVERY 6 HOURS PRN
Status: DISCONTINUED | OUTPATIENT
Start: 2023-08-25 | End: 2023-08-26

## 2023-08-25 RX ORDER — DEXTROSE MONOHYDRATE 100 MG/ML
INJECTION, SOLUTION INTRAVENOUS CONTINUOUS PRN
Status: DISCONTINUED | OUTPATIENT
Start: 2023-08-25 | End: 2023-08-30 | Stop reason: HOSPADM

## 2023-08-25 RX ORDER — SODIUM CHLORIDE 0.9 % (FLUSH) 0.9 %
5-40 SYRINGE (ML) INJECTION EVERY 12 HOURS SCHEDULED
Status: DISCONTINUED | OUTPATIENT
Start: 2023-08-25 | End: 2023-08-30 | Stop reason: HOSPADM

## 2023-08-25 RX ORDER — POLYETHYLENE GLYCOL 3350 17 G/17G
17 POWDER, FOR SOLUTION ORAL DAILY PRN
Status: DISCONTINUED | OUTPATIENT
Start: 2023-08-25 | End: 2023-08-30 | Stop reason: HOSPADM

## 2023-08-25 RX ORDER — ONDANSETRON 4 MG/1
4 TABLET, ORALLY DISINTEGRATING ORAL EVERY 8 HOURS PRN
Status: DISCONTINUED | OUTPATIENT
Start: 2023-08-25 | End: 2023-08-30 | Stop reason: HOSPADM

## 2023-08-25 RX ORDER — ONDANSETRON 2 MG/ML
4 INJECTION INTRAMUSCULAR; INTRAVENOUS EVERY 6 HOURS PRN
Status: DISCONTINUED | OUTPATIENT
Start: 2023-08-25 | End: 2023-08-30 | Stop reason: HOSPADM

## 2023-08-25 RX ORDER — CEFDINIR 300 MG/1
300 CAPSULE ORAL 2 TIMES DAILY
Qty: 14 CAPSULE | Refills: 0 | Status: SHIPPED | OUTPATIENT
Start: 2023-08-25 | End: 2023-09-01

## 2023-08-25 RX ORDER — CITALOPRAM 20 MG/1
20 TABLET ORAL DAILY
Status: DISCONTINUED | OUTPATIENT
Start: 2023-08-26 | End: 2023-08-30 | Stop reason: HOSPADM

## 2023-08-25 RX ORDER — DEXTROSE MONOHYDRATE 25 G/50ML
25 INJECTION, SOLUTION INTRAVENOUS ONCE
Status: COMPLETED | OUTPATIENT
Start: 2023-08-25 | End: 2023-08-25

## 2023-08-25 RX ADMIN — DEXTROSE MONOHYDRATE 125 ML: 100 INJECTION, SOLUTION INTRAVENOUS at 20:08

## 2023-08-25 RX ADMIN — HYDROXYZINE HYDROCHLORIDE 25 MG: 25 TABLET, FILM COATED ORAL at 23:34

## 2023-08-25 RX ADMIN — ENOXAPARIN SODIUM 40 MG: 100 INJECTION SUBCUTANEOUS at 21:47

## 2023-08-25 RX ADMIN — IPRATROPIUM BROMIDE AND ALBUTEROL SULFATE 1 DOSE: .5; 2.5 SOLUTION RESPIRATORY (INHALATION) at 23:01

## 2023-08-25 RX ADMIN — AMIODARONE HYDROCHLORIDE 200 MG: 200 TABLET ORAL at 21:47

## 2023-08-25 RX ADMIN — TRAZODONE HYDROCHLORIDE 75 MG: 50 TABLET ORAL at 21:47

## 2023-08-25 RX ADMIN — DEXTROSE MONOHYDRATE 25 G: 25 INJECTION, SOLUTION INTRAVENOUS at 18:15

## 2023-08-25 RX ADMIN — SODIUM CHLORIDE, PRESERVATIVE FREE 10 ML: 5 INJECTION INTRAVENOUS at 21:51

## 2023-08-25 RX ADMIN — DEXTROSE AND SODIUM CHLORIDE: 5; 450 INJECTION, SOLUTION INTRAVENOUS at 19:00

## 2023-08-25 RX ADMIN — DEXTROSE MONOHYDRATE 25 G: 500 INJECTION PARENTERAL at 18:15

## 2023-08-25 RX ADMIN — CLONAZEPAM 1 MG: 1 TABLET ORAL at 23:34

## 2023-08-25 RX ADMIN — OXYCODONE HYDROCHLORIDE 10 MG: 5 TABLET ORAL at 21:48

## 2023-08-25 RX ADMIN — METOPROLOL TARTRATE 50 MG: 50 TABLET, FILM COATED ORAL at 21:47

## 2023-08-25 RX ADMIN — BUMETANIDE 1 MG: 1 TABLET ORAL at 21:47

## 2023-08-25 RX ADMIN — CEFTRIAXONE SODIUM 1000 MG: 1 INJECTION, POWDER, FOR SOLUTION INTRAMUSCULAR; INTRAVENOUS at 18:32

## 2023-08-25 ASSESSMENT — ENCOUNTER SYMPTOMS
EYE PAIN: 0
VOMITING: 0
DIARRHEA: 0
ABDOMINAL PAIN: 0
SORE THROAT: 0
SINUS PAIN: 0
NAUSEA: 0
COUGH: 0
BACK PAIN: 0
SHORTNESS OF BREATH: 0

## 2023-08-25 ASSESSMENT — PAIN - FUNCTIONAL ASSESSMENT: PAIN_FUNCTIONAL_ASSESSMENT: 0-10

## 2023-08-25 ASSESSMENT — PAIN SCALES - GENERAL
PAINLEVEL_OUTOF10: 6
PAINLEVEL_OUTOF10: 8

## 2023-08-25 ASSESSMENT — PAIN DESCRIPTION - LOCATION: LOCATION: LEG

## 2023-08-25 ASSESSMENT — PAIN DESCRIPTION - ORIENTATION: ORIENTATION: RIGHT;LEFT

## 2023-08-25 NOTE — DISCHARGE INSTRUCTIONS
Take meds as prescribed, monitor glucose level, take insulin as prescribed, to eat after insulin, follow-up with primary physician return to ER for any new or worsening symptoms

## 2023-08-25 NOTE — ED PROVIDER NOTES
[facility] Emergency Department  [address] [address] [facility]. [facility] [address]  Phone: [phone]  Fax: [phone]      Attending Physician Attestation    I performed a history and physical examination of the patient and discussed management with the mid level provideer. I reviewed the mid level provider's note and agree with the documented findings and plan of care. Any areas of disagreement are noted on the chart. I was personally present for the key portions of any procedures. I have documented in the chart those procedures where I was not present during the key portions. I have reviewed the emergency nurses triage note. I agree with the chief complaint, past medical history, past surgical history, allergies, medications, social and family history as documented unless otherwise noted below. Documentation of the HPI, Physical Exam and Medical Decision Making performed by mid level providers is based on my personal performance of the HPI, PE and MDM. For Physician Assistant/ Nurse Practitioner cases/documentation I have personally evaluated this patient and have completed at least one if not all key elements of the E/M (history, physical exam, and MDM). Additional findings are as noted. CHIEF COMPLAINT       Chief Complaint   Patient presents with    Hypoglycemia     [facility] Staff found pt to be unresponsive and BS 47, given 2 glucogon at facility, ems gave 250 ml of d10, last bs 113, pt now awake and oriented.          PAST MEDICAL HISTORY    has a past medical history of A-fib ([address]), Anxiety and depression, Back pain, chronic, BPH (benign prostatic hyperplasia), Cellulitis of left lower extremity, Cellulitis of right lower extremity, CHF (congestive heart failure) ([address]), Chronic respiratory failure with hypoxia ([address]), Cirrhosis ([address]), Diabetes mellitus ([address]), Epididymoorchitis, GERD (gastroesophageal reflux disease), H/O cardiac catheterization, Hepatitis,
Astrid Humphrey MD   sodium zirconium cyclosilicate (LOKELMA) 10 g PACK oral suspension Take 1 packet by mouth daily 8/24/23 10/12/23  Astrid Humphrey MD   menthol-zinc oxide (CALMOSEPTINE) 0.44-20.625 % OINT ointment Apply topically daily Max 30 ml per day. Historical Provider, MD   citalopram (CELEXA) 20 MG tablet Take 1 tablet by mouth daily    Historical Provider, MD   ipratropium 0.5 mg-albuterol 2.5 mg (DUONEB) 0.5-2.5 (3) MG/3ML SOLN nebulizer solution Inhale 3 mLs into the lungs every 4 hours    Historical Provider, MD   miconazole (MICOTIN) 2 % powder Apply topically 2 times daily Apply topically 2 times daily. Historical Provider, MD   oxybutynin (DITROPAN-XL) 10 MG extended release tablet Take 1 tablet by mouth daily    Historical Provider, MD   oxyCODONE HCl (OXY-IR) 10 MG immediate release tablet Take 1 tablet by mouth every 4 hours as needed for Pain. Max Daily Amount: 60 mg    Historical Provider, MD   SODIUM CHLORIDE PO Take 1 g by mouth in the morning and 1 g in the evening. Historical Provider, MD   traZODone (DESYREL) 50 MG tablet Take 1.5 tablets by mouth nightly    Historical Provider, MD   acetaminophen (TYLENOL) 325 MG tablet Take 2 tablets by mouth every 6 hours as needed for Pain    Historical Provider, MD   clonazePAM (KLONOPIN) 0.5 MG tablet Take 1-2 tablets by mouth See Admin Instructions for 30 days.  Indications: 1mg AM, 0.5mg at lunch, 1mg PM 7/17/23 8/16/23  Sukh LUNDBERG Blood, DO   aspirin 81 MG EC tablet Take 1 tablet by mouth daily    Historical Provider, MD   albuterol sulfate HFA (PROVENTIL;VENTOLIN;PROAIR) 108 (90 Base) MCG/ACT inhaler Inhale 2 puffs into the lungs every 6 hours as needed for Wheezing    Historical Provider, MD   magnesium oxide (MAG-OX) 400 (240 Mg) MG tablet Take 1 tablet by mouth daily    Historical Provider, MD   vitamin D (CHOLECALCIFEROL) 13635 UNIT CAPS Take 1 capsule by mouth once a week Indications: Fridays One time a day every Friday    Historical

## 2023-08-26 LAB
ANION GAP SERPL CALCULATED.3IONS-SCNC: 5 MMOL/L (ref 9–17)
BASOPHILS # BLD: 0 K/UL (ref 0–0.2)
BASOPHILS NFR BLD: 0 % (ref 0–2)
BUN SERPL-MCNC: 37 MG/DL (ref 6–20)
CALCIUM SERPL-MCNC: 9.1 MG/DL (ref 8.6–10.4)
CHLORIDE SERPL-SCNC: 93 MMOL/L (ref 98–107)
CO2 SERPL-SCNC: 37 MMOL/L (ref 20–31)
CREAT SERPL-MCNC: 1.3 MG/DL (ref 0.7–1.2)
EOSINOPHIL # BLD: 0.2 K/UL (ref 0–0.4)
EOSINOPHILS RELATIVE PERCENT: 3 % (ref 1–4)
ERYTHROCYTE [DISTWIDTH] IN BLOOD BY AUTOMATED COUNT: 17.3 % (ref 12.5–15.4)
GFR SERPL CREATININE-BSD FRML MDRD: >60 ML/MIN/1.73M2
GLUCOSE BLD-MCNC: 103 MG/DL (ref 75–110)
GLUCOSE BLD-MCNC: 108 MG/DL (ref 75–110)
GLUCOSE BLD-MCNC: 116 MG/DL (ref 75–110)
GLUCOSE BLD-MCNC: 21 MG/DL (ref 75–110)
GLUCOSE BLD-MCNC: 34 MG/DL (ref 75–110)
GLUCOSE BLD-MCNC: 50 MG/DL (ref 75–110)
GLUCOSE BLD-MCNC: 68 MG/DL (ref 75–110)
GLUCOSE BLD-MCNC: 83 MG/DL (ref 75–110)
GLUCOSE BLD-MCNC: 91 MG/DL (ref 75–110)
GLUCOSE BLD-MCNC: 99 MG/DL (ref 75–110)
GLUCOSE SERPL-MCNC: 25 MG/DL (ref 70–99)
HCT VFR BLD AUTO: 29.6 % (ref 41–53)
HGB BLD-MCNC: 9.1 G/DL (ref 13.5–17.5)
LYMPHOCYTES NFR BLD: 0.5 K/UL (ref 1–4.8)
LYMPHOCYTES RELATIVE PERCENT: 6 % (ref 24–44)
MCH RBC QN AUTO: 26.3 PG (ref 26–34)
MCHC RBC AUTO-ENTMCNC: 30.8 G/DL (ref 31–37)
MCV RBC AUTO: 85.3 FL (ref 80–100)
MONOCYTES NFR BLD: 0.6 K/UL (ref 0.1–1.2)
MONOCYTES NFR BLD: 6 % (ref 2–11)
NEUTROPHILS NFR BLD: 85 % (ref 36–66)
NEUTS SEG NFR BLD: 7.8 K/UL (ref 1.8–7.7)
PLATELET # BLD AUTO: 166 K/UL (ref 140–450)
PMV BLD AUTO: 7 FL (ref 6–12)
POTASSIUM SERPL-SCNC: 4.2 MMOL/L (ref 3.7–5.3)
RBC # BLD AUTO: 3.47 M/UL (ref 4.5–5.9)
SODIUM SERPL-SCNC: 135 MMOL/L (ref 135–144)
WBC OTHER # BLD: 9.2 K/UL (ref 3.5–11)

## 2023-08-26 PROCEDURE — 36415 COLL VENOUS BLD VENIPUNCTURE: CPT

## 2023-08-26 PROCEDURE — 6370000000 HC RX 637 (ALT 250 FOR IP): Performed by: STUDENT IN AN ORGANIZED HEALTH CARE EDUCATION/TRAINING PROGRAM

## 2023-08-26 PROCEDURE — 80048 BASIC METABOLIC PNL TOTAL CA: CPT

## 2023-08-26 PROCEDURE — 99223 1ST HOSP IP/OBS HIGH 75: CPT | Performed by: STUDENT IN AN ORGANIZED HEALTH CARE EDUCATION/TRAINING PROGRAM

## 2023-08-26 PROCEDURE — 6370000000 HC RX 637 (ALT 250 FOR IP): Performed by: NURSE PRACTITIONER

## 2023-08-26 PROCEDURE — 2700000000 HC OXYGEN THERAPY PER DAY

## 2023-08-26 PROCEDURE — 94760 N-INVAS EAR/PLS OXIMETRY 1: CPT

## 2023-08-26 PROCEDURE — 6360000002 HC RX W HCPCS: Performed by: STUDENT IN AN ORGANIZED HEALTH CARE EDUCATION/TRAINING PROGRAM

## 2023-08-26 PROCEDURE — 82947 ASSAY GLUCOSE BLOOD QUANT: CPT

## 2023-08-26 PROCEDURE — 2580000003 HC RX 258: Performed by: STUDENT IN AN ORGANIZED HEALTH CARE EDUCATION/TRAINING PROGRAM

## 2023-08-26 PROCEDURE — 6360000002 HC RX W HCPCS: Performed by: INTERNAL MEDICINE

## 2023-08-26 PROCEDURE — 94640 AIRWAY INHALATION TREATMENT: CPT

## 2023-08-26 PROCEDURE — 85025 COMPLETE CBC W/AUTO DIFF WBC: CPT

## 2023-08-26 PROCEDURE — 2060000000 HC ICU INTERMEDIATE R&B

## 2023-08-26 RX ORDER — ALBUTEROL SULFATE 2.5 MG/3ML
2.5 SOLUTION RESPIRATORY (INHALATION)
Status: DISCONTINUED | OUTPATIENT
Start: 2023-08-26 | End: 2023-08-29

## 2023-08-26 RX ORDER — FUROSEMIDE 10 MG/ML
40 INJECTION INTRAMUSCULAR; INTRAVENOUS 2 TIMES DAILY
Status: DISCONTINUED | OUTPATIENT
Start: 2023-08-26 | End: 2023-08-27

## 2023-08-26 RX ORDER — INSULIN GLARGINE 100 [IU]/ML
20 INJECTION, SOLUTION SUBCUTANEOUS EVERY MORNING
Status: DISCONTINUED | OUTPATIENT
Start: 2023-08-27 | End: 2023-08-27

## 2023-08-26 RX ADMIN — FUROSEMIDE 40 MG: 10 INJECTION, SOLUTION INTRAMUSCULAR; INTRAVENOUS at 15:00

## 2023-08-26 RX ADMIN — TRAZODONE HYDROCHLORIDE 75 MG: 50 TABLET ORAL at 20:54

## 2023-08-26 RX ADMIN — SODIUM CHLORIDE, PRESERVATIVE FREE 10 ML: 5 INJECTION INTRAVENOUS at 21:21

## 2023-08-26 RX ADMIN — HYDROXYZINE HYDROCHLORIDE 25 MG: 25 TABLET, FILM COATED ORAL at 08:31

## 2023-08-26 RX ADMIN — OXYCODONE HYDROCHLORIDE 10 MG: 5 TABLET ORAL at 21:20

## 2023-08-26 RX ADMIN — CLONAZEPAM 1 MG: 1 TABLET ORAL at 11:12

## 2023-08-26 RX ADMIN — OXYCODONE HYDROCHLORIDE 10 MG: 5 TABLET ORAL at 08:31

## 2023-08-26 RX ADMIN — ASPIRIN 81 MG: 81 TABLET, COATED ORAL at 08:39

## 2023-08-26 RX ADMIN — IPRATROPIUM BROMIDE AND ALBUTEROL SULFATE 1 DOSE: .5; 2.5 SOLUTION RESPIRATORY (INHALATION) at 20:24

## 2023-08-26 RX ADMIN — BUMETANIDE 1 MG: 1 TABLET ORAL at 20:55

## 2023-08-26 RX ADMIN — HYDROXYZINE HYDROCHLORIDE 25 MG: 25 TABLET, FILM COATED ORAL at 21:19

## 2023-08-26 RX ADMIN — OXYCODONE HYDROCHLORIDE 10 MG: 5 TABLET ORAL at 04:37

## 2023-08-26 RX ADMIN — ENOXAPARIN SODIUM 40 MG: 100 INJECTION SUBCUTANEOUS at 08:39

## 2023-08-26 RX ADMIN — CITALOPRAM HYDROBROMIDE 20 MG: 20 TABLET ORAL at 08:39

## 2023-08-26 RX ADMIN — IPRATROPIUM BROMIDE AND ALBUTEROL SULFATE 1 DOSE: .5; 2.5 SOLUTION RESPIRATORY (INHALATION) at 12:12

## 2023-08-26 RX ADMIN — OXYCODONE HYDROCHLORIDE 10 MG: 5 TABLET ORAL at 14:22

## 2023-08-26 RX ADMIN — CEFTRIAXONE SODIUM 1000 MG: 1 INJECTION, POWDER, FOR SOLUTION INTRAMUSCULAR; INTRAVENOUS at 18:16

## 2023-08-26 RX ADMIN — LEVOTHYROXINE SODIUM 200 MCG: 75 TABLET ORAL at 11:12

## 2023-08-26 RX ADMIN — IPRATROPIUM BROMIDE AND ALBUTEROL SULFATE 1 DOSE: .5; 2.5 SOLUTION RESPIRATORY (INHALATION) at 23:40

## 2023-08-26 RX ADMIN — IPRATROPIUM BROMIDE AND ALBUTEROL SULFATE 1 DOSE: .5; 2.5 SOLUTION RESPIRATORY (INHALATION) at 16:25

## 2023-08-26 RX ADMIN — AMIODARONE HYDROCHLORIDE 200 MG: 200 TABLET ORAL at 20:55

## 2023-08-26 RX ADMIN — TAMSULOSIN HYDROCHLORIDE 0.4 MG: 0.4 CAPSULE ORAL at 08:39

## 2023-08-26 RX ADMIN — DEXTROSE MONOHYDRATE 250 ML: 100 INJECTION, SOLUTION INTRAVENOUS at 08:15

## 2023-08-26 RX ADMIN — HYDROXYZINE HYDROCHLORIDE 25 MG: 25 TABLET, FILM COATED ORAL at 15:11

## 2023-08-26 RX ADMIN — CLONAZEPAM 1 MG: 1 TABLET ORAL at 21:20

## 2023-08-26 RX ADMIN — IPRATROPIUM BROMIDE AND ALBUTEROL SULFATE 1 DOSE: .5; 2.5 SOLUTION RESPIRATORY (INHALATION) at 07:58

## 2023-08-26 RX ADMIN — ENOXAPARIN SODIUM 40 MG: 100 INJECTION SUBCUTANEOUS at 20:55

## 2023-08-26 ASSESSMENT — PAIN DESCRIPTION - LOCATION
LOCATION: GENERALIZED
LOCATION: LEG
LOCATION: GENERALIZED

## 2023-08-26 ASSESSMENT — PAIN DESCRIPTION - ORIENTATION: ORIENTATION: LEFT;RIGHT

## 2023-08-26 ASSESSMENT — PAIN SCALES - GENERAL
PAINLEVEL_OUTOF10: 8
PAINLEVEL_OUTOF10: 8
PAINLEVEL_OUTOF10: 5
PAINLEVEL_OUTOF10: 8
PAINLEVEL_OUTOF10: 7

## 2023-08-26 ASSESSMENT — PAIN DESCRIPTION - DESCRIPTORS: DESCRIPTORS: THROBBING;NUMBNESS;TINGLING

## 2023-08-26 NOTE — H&P
Urinary bladder neurogenic dysfunction     Urinary tract infection associated with indwelling urethral catheter ([address]) 11/04/2020        Past Surgical History:     Past Surgical History:   Procedure Laterality Date    ABDOMEN SURGERY      hernia repair x4 (ventral)    COLONOSCOPY      2012    COLONOSCOPY N/A 04/19/2023    COLONOSCOPY DIAGNOSTIC performed by Roberto Guaman MD at [address]  04/20/2023    COLONOSCOPY N/A 4/20/2023    COLONOSCOPY DIAGNOSTIC performed by Roberto Guaman MD at [address]  01/24/2018    CYSTOSCOPY  02/20/2019    CYSTOSCOPY N/A 02/20/2019    CYSTOSCOPY DILATION performed by Damir Johnston MD at [address] N/A 08/23/2019    CYSTOSCOPY/ DILATION NICOLE CATHETER EXCHANGE performed by Damir Johnston MD at [address] N/A 06/08/2022    CYSTOSCOPY, REMOVAL OF SMALL BLADDER STONE AND BLADDER IRRIGATION performed by Damir Johnston MD at [address] [facility] [place], [address] [facility]  05/20/2018    repair and reduction of recurrent incarcerated incisional hernia with mesh    [place] CYSTOURETHROSCOPY N/A 01/24/2018    CYSTOSCOPY Cammie Owen performed by Damir Johnston MD at [address] EGD TRANSORAL BIOPSY SINGLE/MULTIPLE N/A 07/19/2017    EGD BIOPSY performed by Yaritza Billy MD at [address] I&D [address] [facility] 1 BURSAL SPACE Bilateral 08/22/2017    FOOT DEBRIDEMENT INCISION AND DRAINAGE with bone bx performed by Mary Camara DPM at [address] [facility]  07/19/2017    polypectomy    UPPER GASTROINTESTINAL ENDOSCOPY N/A 03/14/2019    EGD BIOPSY performed by Hanh Taylor MD at [facility]    UPPER GASTROINTESTINAL ENDOSCOPY N/A 04/18/2023    EGD ESOPHAGOGASTRODUODENOSCOPY performed by Roberto Guaman MD at [address] N/A 05/20/2018    REPAIR AND REDUCTION OF RECURRENT INCARCERATED INCISIONAL HERNIA WITH MESH performed by Niurka Piedra MD at [address] [facility]

## 2023-08-26 NOTE — PLAN OF CARE
Problem: Respiratory - Adult  Goal: Achieves optimal ventilation and oxygenation  8/26/2023 0813 by Emy Salgado RCP  Outcome: Not Progressing  Flowsheets (Taken 8/26/2023 0813)  Achieves optimal ventilation and oxygenation:   Assess for changes in respiratory status   Respiratory therapy support as indicated   Oxygen supplementation based on oxygen saturation or arterial blood gases   Assess for changes in mentation and behavior   Assess and instruct to report shortness of breath or any respiratory difficulty  8/25/2023 2306 by Shilpi Ortez RCP  Flowsheets (Taken 8/25/2023 2306)  Achieves optimal ventilation and oxygenation:   Assess for changes in respiratory status   Respiratory therapy support as indicated   Position to facilitate oxygenation and minimize respiratory effort   Oxygen supplementation based on oxygen saturation or arterial blood gases

## 2023-08-26 NOTE — PLAN OF CARE
Problem: Respiratory - Adult  Goal: Achieves optimal ventilation and oxygenation  Flowsheets (Taken 8/25/2023 2306)  Achieves optimal ventilation and oxygenation:   Assess for changes in respiratory status   Respiratory therapy support as indicated   Position to facilitate oxygenation and minimize respiratory effort   Oxygen supplementation based on oxygen saturation or arterial blood gases

## 2023-08-27 ENCOUNTER — APPOINTMENT (OUTPATIENT)
Dept: GENERAL RADIOLOGY | Age: 58
DRG: 637 | End: 2023-08-27
Payer: MEDICARE

## 2023-08-27 LAB
ANION GAP SERPL CALCULATED.3IONS-SCNC: 10 MMOL/L (ref 9–17)
BASOPHILS # BLD: 0 K/UL (ref 0–0.2)
BASOPHILS NFR BLD: 1 % (ref 0–2)
BUN SERPL-MCNC: 31 MG/DL (ref 6–20)
CALCIUM SERPL-MCNC: 8.7 MG/DL (ref 8.6–10.4)
CHLORIDE SERPL-SCNC: 94 MMOL/L (ref 98–107)
CO2 SERPL-SCNC: 33 MMOL/L (ref 20–31)
CREAT SERPL-MCNC: 1.4 MG/DL (ref 0.7–1.2)
EOSINOPHIL # BLD: 0.1 K/UL (ref 0–0.4)
EOSINOPHILS RELATIVE PERCENT: 2 % (ref 1–4)
ERYTHROCYTE [DISTWIDTH] IN BLOOD BY AUTOMATED COUNT: 17.6 % (ref 12.5–15.4)
GFR SERPL CREATININE-BSD FRML MDRD: 58 ML/MIN/1.73M2
GLUCOSE BLD-MCNC: 156 MG/DL (ref 75–110)
GLUCOSE BLD-MCNC: 160 MG/DL (ref 75–110)
GLUCOSE BLD-MCNC: 237 MG/DL (ref 75–110)
GLUCOSE BLD-MCNC: 80 MG/DL (ref 75–110)
GLUCOSE BLD-MCNC: 83 MG/DL (ref 75–110)
GLUCOSE BLD-MCNC: 99 MG/DL (ref 75–110)
GLUCOSE SERPL-MCNC: 82 MG/DL (ref 70–99)
HCT VFR BLD AUTO: 29.1 % (ref 41–53)
HGB BLD-MCNC: 8.8 G/DL (ref 13.5–17.5)
LYMPHOCYTES NFR BLD: 0.5 K/UL (ref 1–4.8)
LYMPHOCYTES RELATIVE PERCENT: 8 % (ref 24–44)
MCH RBC QN AUTO: 26.5 PG (ref 26–34)
MCHC RBC AUTO-ENTMCNC: 30.3 G/DL (ref 31–37)
MCV RBC AUTO: 87.4 FL (ref 80–100)
MONOCYTES NFR BLD: 0.4 K/UL (ref 0.1–1.2)
MONOCYTES NFR BLD: 6 % (ref 2–11)
NEUTROPHILS NFR BLD: 83 % (ref 36–66)
NEUTS SEG NFR BLD: 5.1 K/UL (ref 1.8–7.7)
PLATELET # BLD AUTO: 144 K/UL (ref 140–450)
PMV BLD AUTO: 7.2 FL (ref 6–12)
POTASSIUM SERPL-SCNC: 3.1 MMOL/L (ref 3.7–5.3)
POTASSIUM SERPL-SCNC: 3.6 MMOL/L (ref 3.7–5.3)
RBC # BLD AUTO: 3.33 M/UL (ref 4.5–5.9)
SODIUM SERPL-SCNC: 137 MMOL/L (ref 135–144)
WBC OTHER # BLD: 6.1 K/UL (ref 3.5–11)

## 2023-08-27 PROCEDURE — 97162 PT EVAL MOD COMPLEX 30 MIN: CPT

## 2023-08-27 PROCEDURE — 85025 COMPLETE CBC W/AUTO DIFF WBC: CPT

## 2023-08-27 PROCEDURE — 6360000002 HC RX W HCPCS: Performed by: STUDENT IN AN ORGANIZED HEALTH CARE EDUCATION/TRAINING PROGRAM

## 2023-08-27 PROCEDURE — 36415 COLL VENOUS BLD VENIPUNCTURE: CPT

## 2023-08-27 PROCEDURE — 6370000000 HC RX 637 (ALT 250 FOR IP): Performed by: STUDENT IN AN ORGANIZED HEALTH CARE EDUCATION/TRAINING PROGRAM

## 2023-08-27 PROCEDURE — 94640 AIRWAY INHALATION TREATMENT: CPT

## 2023-08-27 PROCEDURE — 84132 ASSAY OF SERUM POTASSIUM: CPT

## 2023-08-27 PROCEDURE — 97166 OT EVAL MOD COMPLEX 45 MIN: CPT

## 2023-08-27 PROCEDURE — 6370000000 HC RX 637 (ALT 250 FOR IP): Performed by: INTERNAL MEDICINE

## 2023-08-27 PROCEDURE — 71045 X-RAY EXAM CHEST 1 VIEW: CPT

## 2023-08-27 PROCEDURE — 6360000002 HC RX W HCPCS: Performed by: INTERNAL MEDICINE

## 2023-08-27 PROCEDURE — 94669 MECHANICAL CHEST WALL OSCILL: CPT

## 2023-08-27 PROCEDURE — 2060000000 HC ICU INTERMEDIATE R&B

## 2023-08-27 PROCEDURE — 94760 N-INVAS EAR/PLS OXIMETRY 1: CPT

## 2023-08-27 PROCEDURE — 99232 SBSQ HOSP IP/OBS MODERATE 35: CPT | Performed by: STUDENT IN AN ORGANIZED HEALTH CARE EDUCATION/TRAINING PROGRAM

## 2023-08-27 PROCEDURE — 2700000000 HC OXYGEN THERAPY PER DAY

## 2023-08-27 PROCEDURE — 97530 THERAPEUTIC ACTIVITIES: CPT

## 2023-08-27 PROCEDURE — 82947 ASSAY GLUCOSE BLOOD QUANT: CPT

## 2023-08-27 PROCEDURE — 6370000000 HC RX 637 (ALT 250 FOR IP): Performed by: NURSE PRACTITIONER

## 2023-08-27 PROCEDURE — 2580000003 HC RX 258: Performed by: STUDENT IN AN ORGANIZED HEALTH CARE EDUCATION/TRAINING PROGRAM

## 2023-08-27 PROCEDURE — 99223 1ST HOSP IP/OBS HIGH 75: CPT | Performed by: INTERNAL MEDICINE

## 2023-08-27 PROCEDURE — 80048 BASIC METABOLIC PNL TOTAL CA: CPT

## 2023-08-27 RX ORDER — POTASSIUM CHLORIDE 20 MEQ/1
40 TABLET, EXTENDED RELEASE ORAL PRN
Status: DISCONTINUED | OUTPATIENT
Start: 2023-08-27 | End: 2023-08-30 | Stop reason: HOSPADM

## 2023-08-27 RX ORDER — SENNA AND DOCUSATE SODIUM 50; 8.6 MG/1; MG/1
2 TABLET, FILM COATED ORAL DAILY PRN
Status: DISCONTINUED | OUTPATIENT
Start: 2023-08-27 | End: 2023-08-30 | Stop reason: HOSPADM

## 2023-08-27 RX ORDER — ACETAZOLAMIDE 250 MG/1
250 TABLET ORAL DAILY
Status: DISCONTINUED | OUTPATIENT
Start: 2023-08-27 | End: 2023-08-30 | Stop reason: HOSPADM

## 2023-08-27 RX ORDER — POTASSIUM CHLORIDE 7.45 MG/ML
10 INJECTION INTRAVENOUS PRN
Status: DISCONTINUED | OUTPATIENT
Start: 2023-08-27 | End: 2023-08-30 | Stop reason: HOSPADM

## 2023-08-27 RX ORDER — INSULIN LISPRO 100 [IU]/ML
0-8 INJECTION, SOLUTION INTRAVENOUS; SUBCUTANEOUS
Status: DISCONTINUED | OUTPATIENT
Start: 2023-08-27 | End: 2023-08-30 | Stop reason: HOSPADM

## 2023-08-27 RX ORDER — MIDODRINE HYDROCHLORIDE 5 MG/1
5 TABLET ORAL
Status: DISCONTINUED | OUTPATIENT
Start: 2023-08-27 | End: 2023-08-30 | Stop reason: HOSPADM

## 2023-08-27 RX ORDER — MAGNESIUM SULFATE IN WATER 40 MG/ML
2000 INJECTION, SOLUTION INTRAVENOUS PRN
Status: DISCONTINUED | OUTPATIENT
Start: 2023-08-27 | End: 2023-08-30 | Stop reason: HOSPADM

## 2023-08-27 RX ORDER — CLONAZEPAM 1 MG/1
1 TABLET ORAL 3 TIMES DAILY PRN
Status: DISCONTINUED | OUTPATIENT
Start: 2023-08-27 | End: 2023-08-30 | Stop reason: HOSPADM

## 2023-08-27 RX ORDER — IPRATROPIUM BROMIDE AND ALBUTEROL SULFATE 2.5; .5 MG/3ML; MG/3ML
1 SOLUTION RESPIRATORY (INHALATION)
Status: DISCONTINUED | OUTPATIENT
Start: 2023-08-27 | End: 2023-08-28

## 2023-08-27 RX ORDER — FUROSEMIDE 10 MG/ML
60 INJECTION INTRAMUSCULAR; INTRAVENOUS 3 TIMES DAILY
Status: DISCONTINUED | OUTPATIENT
Start: 2023-08-27 | End: 2023-08-29

## 2023-08-27 RX ORDER — POLYETHYLENE GLYCOL 3350 17 G/17G
17 POWDER, FOR SOLUTION ORAL DAILY
Status: DISCONTINUED | OUTPATIENT
Start: 2023-08-27 | End: 2023-08-30 | Stop reason: HOSPADM

## 2023-08-27 RX ORDER — INSULIN LISPRO 100 [IU]/ML
0-4 INJECTION, SOLUTION INTRAVENOUS; SUBCUTANEOUS NIGHTLY
Status: DISCONTINUED | OUTPATIENT
Start: 2023-08-27 | End: 2023-08-30 | Stop reason: HOSPADM

## 2023-08-27 RX ORDER — GUAIFENESIN AND DEXTROMETHORPHAN HYDROBROMIDE 100; 10 MG/5ML; MG/5ML
5 SOLUTION ORAL EVERY 4 HOURS PRN
Status: DISCONTINUED | OUTPATIENT
Start: 2023-08-27 | End: 2023-08-30 | Stop reason: HOSPADM

## 2023-08-27 RX ADMIN — CITALOPRAM HYDROBROMIDE 20 MG: 20 TABLET ORAL at 09:30

## 2023-08-27 RX ADMIN — OXYCODONE HYDROCHLORIDE 10 MG: 5 TABLET ORAL at 09:41

## 2023-08-27 RX ADMIN — OXYCODONE HYDROCHLORIDE 10 MG: 5 TABLET ORAL at 21:38

## 2023-08-27 RX ADMIN — FUROSEMIDE 40 MG: 10 INJECTION, SOLUTION INTRAMUSCULAR; INTRAVENOUS at 09:30

## 2023-08-27 RX ADMIN — TRAZODONE HYDROCHLORIDE 75 MG: 50 TABLET ORAL at 21:38

## 2023-08-27 RX ADMIN — CLONAZEPAM 1 MG: 1 TABLET ORAL at 16:42

## 2023-08-27 RX ADMIN — FUROSEMIDE 60 MG: 10 INJECTION, SOLUTION INTRAMUSCULAR; INTRAVENOUS at 21:37

## 2023-08-27 RX ADMIN — LIDOCAINE HYDROCHLORIDE: 20 SOLUTION ORAL; TOPICAL at 16:39

## 2023-08-27 RX ADMIN — CLONAZEPAM 1 MG: 1 TABLET ORAL at 09:30

## 2023-08-27 RX ADMIN — CLONAZEPAM 1 MG: 1 TABLET ORAL at 21:37

## 2023-08-27 RX ADMIN — CEFTRIAXONE SODIUM 1000 MG: 1 INJECTION, POWDER, FOR SOLUTION INTRAMUSCULAR; INTRAVENOUS at 18:21

## 2023-08-27 RX ADMIN — IPRATROPIUM BROMIDE AND ALBUTEROL SULFATE 1 DOSE: .5; 3 SOLUTION RESPIRATORY (INHALATION) at 16:16

## 2023-08-27 RX ADMIN — OXYCODONE HYDROCHLORIDE 10 MG: 5 TABLET ORAL at 14:38

## 2023-08-27 RX ADMIN — IPRATROPIUM BROMIDE AND ALBUTEROL SULFATE 1 DOSE: .5; 2.5 SOLUTION RESPIRATORY (INHALATION) at 07:53

## 2023-08-27 RX ADMIN — FUROSEMIDE 60 MG: 10 INJECTION, SOLUTION INTRAMUSCULAR; INTRAVENOUS at 14:38

## 2023-08-27 RX ADMIN — HYDROXYZINE HYDROCHLORIDE 25 MG: 25 TABLET, FILM COATED ORAL at 12:26

## 2023-08-27 RX ADMIN — SODIUM CHLORIDE, PRESERVATIVE FREE 10 ML: 5 INJECTION INTRAVENOUS at 21:39

## 2023-08-27 RX ADMIN — ENOXAPARIN SODIUM 40 MG: 100 INJECTION SUBCUTANEOUS at 21:37

## 2023-08-27 RX ADMIN — TAMSULOSIN HYDROCHLORIDE 0.4 MG: 0.4 CAPSULE ORAL at 09:30

## 2023-08-27 RX ADMIN — AMIODARONE HYDROCHLORIDE 200 MG: 200 TABLET ORAL at 09:30

## 2023-08-27 RX ADMIN — MIDODRINE HYDROCHLORIDE 5 MG: 5 TABLET ORAL at 16:39

## 2023-08-27 RX ADMIN — AMIODARONE HYDROCHLORIDE 200 MG: 200 TABLET ORAL at 21:39

## 2023-08-27 RX ADMIN — ASPIRIN 81 MG: 81 TABLET, COATED ORAL at 09:30

## 2023-08-27 RX ADMIN — HYDROXYZINE HYDROCHLORIDE 25 MG: 25 TABLET, FILM COATED ORAL at 06:05

## 2023-08-27 RX ADMIN — ACETAZOLAMIDE 250 MG: 250 TABLET ORAL at 12:26

## 2023-08-27 RX ADMIN — IPRATROPIUM BROMIDE AND ALBUTEROL SULFATE 1 DOSE: .5; 3 SOLUTION RESPIRATORY (INHALATION) at 11:32

## 2023-08-27 RX ADMIN — POLYETHYLENE GLYCOL 3350 17 G: 17 POWDER, FOR SOLUTION ORAL at 12:30

## 2023-08-27 RX ADMIN — ENOXAPARIN SODIUM 40 MG: 100 INJECTION SUBCUTANEOUS at 09:31

## 2023-08-27 RX ADMIN — OXYCODONE HYDROCHLORIDE 10 MG: 5 TABLET ORAL at 06:05

## 2023-08-27 RX ADMIN — IPRATROPIUM BROMIDE AND ALBUTEROL SULFATE 1 DOSE: .5; 3 SOLUTION RESPIRATORY (INHALATION) at 19:36

## 2023-08-27 RX ADMIN — MIDODRINE HYDROCHLORIDE 5 MG: 5 TABLET ORAL at 09:41

## 2023-08-27 RX ADMIN — LEVOTHYROXINE SODIUM 200 MCG: 75 TABLET ORAL at 09:35

## 2023-08-27 RX ADMIN — SENNOSIDES AND DOCUSATE SODIUM 2 TABLET: 50; 8.6 TABLET ORAL at 12:33

## 2023-08-27 ASSESSMENT — PAIN SCALES - GENERAL
PAINLEVEL_OUTOF10: 6
PAINLEVEL_OUTOF10: 6
PAINLEVEL_OUTOF10: 7
PAINLEVEL_OUTOF10: 6
PAINLEVEL_OUTOF10: 8
PAINLEVEL_OUTOF10: 4
PAINLEVEL_OUTOF10: 8
PAINLEVEL_OUTOF10: 4

## 2023-08-27 ASSESSMENT — ENCOUNTER SYMPTOMS
APNEA: 0
WHEEZING: 0
NAUSEA: 0
CHEST TIGHTNESS: 1
COUGH: 1
DIARRHEA: 0
ABDOMINAL DISTENTION: 1
VOMITING: 0
EYE REDNESS: 0
CONSTIPATION: 1
EYE PAIN: 0
BACK PAIN: 0
SHORTNESS OF BREATH: 1
COLOR CHANGE: 0

## 2023-08-27 ASSESSMENT — PAIN DESCRIPTION - DESCRIPTORS: DESCRIPTORS: ACHING

## 2023-08-27 ASSESSMENT — PAIN DESCRIPTION - LOCATION
LOCATION: GENERALIZED

## 2023-08-27 NOTE — CONSULTS
3.6*   CO2 37* 33*   BUN 37* 31*   CREATININE 1.3* 1.4*   LABGLOM >60 58*   GLUCOSE 25* 82     BNP: No results for input(s): BNP in the last 72 hours. PT/INR: No results for input(s): PROTIME, INR in the last 72 hours. APTT:No results for input(s): APTT in the last 72 hours. CARDIAC ENZYMES:No results for input(s): TROPHS in the last 72 hours. FASTING LIPID PANEL:  Lab Results   Component Value Date/Time    HDL 40 11/10/2020 07:05 AM    TRIG 231 11/10/2020 07:05 AM     LIVER PROFILE:  Recent Labs     08/25/23  1400   AST 15   ALT 13   LABALBU 3.1*       Seen by Dr. Leonidas Perez on 4/23:  Atrial fibrillation with RVR   HFpEF, last LVEF normal in 2021   Encephalopathy likely from CO2 narcosis   Chronic hypoxic/hypercapnic respiratory failure  FABI/obesity hypoventilation syndrome/morbid obesity  Hyponatremia  UTI/chronic indwelling Clemons catheter  Alcoholic cirrhosis of the liver  DM  HTN   -----------------------  Increase lopressor to 50 mg bid  Decrease amiodarone to 200 mg bid  Will need DOAC on discharge, recommend Eliquis 5 mg bid. Change iv Bumex to po 1 mg bid   Patient was offered better rate control or NENITA/CV prior to discharge, he refused and would like to be discharged  Recommend repeat echo as OP to reassess LV/RV function. Limited Echo 4/23:  Contrast was utilized on this technically difficult study. Limited for  function. Left ventricle is normal in size. Global left ventricular systolic function is normal with an estimated  ejection fraction of 60 % . No obvious wall motion abnormality seen.     IMPRESSION:      Ac on Ch HFpEF, mostly right-sided CHF, cor pulmonale  Hypoglycemia   UTI  Known PAF  Known FABI/OHS  Recent admission for HyperK  Limited Echo 4/23- normal EF  Hypotension- add back midodrine     RECOMMENDATIONS:  Not on AC due to hematuria and GI bleed  On amio and ASA  On BB  Hold oral bumex while on IV lasix 60 3 times daily  Add midodrine   Strict I and O, daily weights  Add

## 2023-08-27 NOTE — PLAN OF CARE
Problem: Skin/Tissue Integrity - Adult  Goal: Incisions, wounds, or drain sites healing without S/S of infection  Outcome: Not Progressing     Problem: Musculoskeletal - Adult  Goal: Return mobility to safest level of function  Outcome: Not Progressing     Problem: Safety - Adult  Goal: Free from fall injury  Outcome: Progressing     Problem: Pain  Goal: Verbalizes/displays adequate comfort level or baseline comfort level  Outcome: Progressing     Problem: Skin/Tissue Integrity - Adult  Goal: Oral mucous membranes remain intact  Outcome: Progressing     Problem: Musculoskeletal - Adult  Goal: Maintain proper alignment of affected body part  Outcome: Progressing     Problem: Genitourinary - Adult  Goal: Absence of urinary retention  Outcome: Progressing  Goal: Urinary catheter remains patent  Outcome: Progressing     Problem: Skin/Tissue Integrity - Adult  Goal: Incisions, wounds, or drain sites healing without S/S of infection  Outcome: Not Progressing     Problem: Musculoskeletal - Adult  Goal: Return mobility to safest level of function  Outcome: Not Progressing

## 2023-08-27 NOTE — PLAN OF CARE
Problem: Respiratory - Adult  Goal: Achieves optimal ventilation and oxygenation  Flowsheets (Taken 8/27/2023 0758)  Achieves optimal ventilation and oxygenation:   Assess for changes in respiratory status   Position to facilitate oxygenation and minimize respiratory effort   Respiratory therapy support as indicated   Encourage broncho-pulmonary hygiene including cough, deep breathe, incentive spirometry     Problem: Skin/Tissue Integrity - Adult  Goal: Incisions, wounds, or drain sites healing without S/S of infection  8/26/2023 2235 by Anat Delacruz RN  Outcome: Not Progressing     Problem: Musculoskeletal - Adult  Goal: Return mobility to safest level of function  8/26/2023 2235 by Anat Delacruz RN  Outcome: Not Progressing

## 2023-08-27 NOTE — PLAN OF CARE
Problem: Discharge Planning  Goal: Discharge to home or other facility with appropriate resources  Outcome: Progressing  Flowsheets (Taken 8/27/2023 1655)  Discharge to home or other facility with appropriate resources:   Identify barriers to discharge with patient and caregiver   Arrange for needed discharge resources and transportation as appropriate   Identify discharge learning needs (meds, wound care, etc)   Refer to discharge planning if patient needs post-hospital services based on physician order or complex needs related to functional status, cognitive ability or social support system     Problem: Safety - Adult  Goal: Free from fall injury  Outcome: Progressing  Flowsheets (Taken 8/27/2023 1655)  Free From Fall Injury:   Instruct family/caregiver on patient safety   Based on caregiver fall risk screen, instruct family/caregiver to ask for assistance with transferring infant if caregiver noted to have fall risk factors     Problem: Pain  Goal: Verbalizes/displays adequate comfort level or baseline comfort level  Outcome: Progressing  Flowsheets (Taken 8/27/2023 1655)  Verbalizes/displays adequate comfort level or baseline comfort level:   Encourage patient to monitor pain and request assistance   Assess pain using appropriate pain scale   Implement non-pharmacological measures as appropriate and evaluate response   Administer analgesics based on type and severity of pain and evaluate response

## 2023-08-28 LAB
ANION GAP SERPL CALCULATED.3IONS-SCNC: 10 MMOL/L (ref 9–17)
BASOPHILS # BLD: 0 K/UL (ref 0–0.2)
BASOPHILS NFR BLD: 0 % (ref 0–2)
BUN SERPL-MCNC: 25 MG/DL (ref 6–20)
CALCIUM SERPL-MCNC: 8.7 MG/DL (ref 8.6–10.4)
CHLORIDE SERPL-SCNC: 94 MMOL/L (ref 98–107)
CO2 SERPL-SCNC: 39 MMOL/L (ref 20–31)
CREAT SERPL-MCNC: 1.4 MG/DL (ref 0.7–1.2)
EOSINOPHIL # BLD: 0.17 K/UL (ref 0–0.4)
EOSINOPHILS RELATIVE PERCENT: 3 % (ref 1–4)
ERYTHROCYTE [DISTWIDTH] IN BLOOD BY AUTOMATED COUNT: 17.2 % (ref 12.5–15.4)
GFR SERPL CREATININE-BSD FRML MDRD: 58 ML/MIN/1.73M2
GLUCOSE BLD-MCNC: 121 MG/DL (ref 75–110)
GLUCOSE BLD-MCNC: 151 MG/DL (ref 75–110)
GLUCOSE BLD-MCNC: 170 MG/DL (ref 75–110)
GLUCOSE BLD-MCNC: 184 MG/DL (ref 75–110)
GLUCOSE BLD-MCNC: 204 MG/DL (ref 75–110)
GLUCOSE SERPL-MCNC: 153 MG/DL (ref 70–99)
HCT VFR BLD AUTO: 27.5 % (ref 41–53)
HGB BLD-MCNC: 8.3 G/DL (ref 13.5–17.5)
LYMPHOCYTES NFR BLD: 0.23 K/UL (ref 1–4.8)
LYMPHOCYTES RELATIVE PERCENT: 4 % (ref 24–44)
MAGNESIUM SERPL-MCNC: 1.6 MG/DL (ref 1.6–2.6)
MCH RBC QN AUTO: 26.3 PG (ref 26–34)
MCHC RBC AUTO-ENTMCNC: 30.1 G/DL (ref 31–37)
MCV RBC AUTO: 87.5 FL (ref 80–100)
MONOCYTES NFR BLD: 0.29 K/UL (ref 0.1–0.8)
MONOCYTES NFR BLD: 5 % (ref 1–7)
MORPHOLOGY: ABNORMAL
NEUTROPHILS NFR BLD: 88 % (ref 36–66)
NEUTS SEG NFR BLD: 5.11 K/UL (ref 1.8–7.7)
PLATELET # BLD AUTO: 119 K/UL (ref 140–450)
PMV BLD AUTO: 7.3 FL (ref 6–12)
POTASSIUM SERPL-SCNC: 3.1 MMOL/L (ref 3.7–5.3)
RBC # BLD AUTO: 3.14 M/UL (ref 4.5–5.9)
SODIUM SERPL-SCNC: 143 MMOL/L (ref 135–144)
WBC OTHER # BLD: 5.8 K/UL (ref 3.5–11)

## 2023-08-28 PROCEDURE — 94669 MECHANICAL CHEST WALL OSCILL: CPT

## 2023-08-28 PROCEDURE — 6370000000 HC RX 637 (ALT 250 FOR IP): Performed by: INTERNAL MEDICINE

## 2023-08-28 PROCEDURE — 99233 SBSQ HOSP IP/OBS HIGH 50: CPT | Performed by: NURSE PRACTITIONER

## 2023-08-28 PROCEDURE — 6370000000 HC RX 637 (ALT 250 FOR IP): Performed by: STUDENT IN AN ORGANIZED HEALTH CARE EDUCATION/TRAINING PROGRAM

## 2023-08-28 PROCEDURE — 99233 SBSQ HOSP IP/OBS HIGH 50: CPT | Performed by: STUDENT IN AN ORGANIZED HEALTH CARE EDUCATION/TRAINING PROGRAM

## 2023-08-28 PROCEDURE — 82947 ASSAY GLUCOSE BLOOD QUANT: CPT

## 2023-08-28 PROCEDURE — 36415 COLL VENOUS BLD VENIPUNCTURE: CPT

## 2023-08-28 PROCEDURE — 6370000000 HC RX 637 (ALT 250 FOR IP): Performed by: NURSE PRACTITIONER

## 2023-08-28 PROCEDURE — 94760 N-INVAS EAR/PLS OXIMETRY 1: CPT

## 2023-08-28 PROCEDURE — 94640 AIRWAY INHALATION TREATMENT: CPT

## 2023-08-28 PROCEDURE — 6360000002 HC RX W HCPCS: Performed by: STUDENT IN AN ORGANIZED HEALTH CARE EDUCATION/TRAINING PROGRAM

## 2023-08-28 PROCEDURE — 99212 OFFICE O/P EST SF 10 MIN: CPT

## 2023-08-28 PROCEDURE — 2700000000 HC OXYGEN THERAPY PER DAY

## 2023-08-28 PROCEDURE — 2060000000 HC ICU INTERMEDIATE R&B

## 2023-08-28 PROCEDURE — 80048 BASIC METABOLIC PNL TOTAL CA: CPT

## 2023-08-28 PROCEDURE — 6360000002 HC RX W HCPCS: Performed by: INTERNAL MEDICINE

## 2023-08-28 PROCEDURE — 83735 ASSAY OF MAGNESIUM: CPT

## 2023-08-28 PROCEDURE — 6360000002 HC RX W HCPCS: Performed by: NURSE PRACTITIONER

## 2023-08-28 PROCEDURE — 85025 COMPLETE CBC W/AUTO DIFF WBC: CPT

## 2023-08-28 PROCEDURE — 2580000003 HC RX 258: Performed by: STUDENT IN AN ORGANIZED HEALTH CARE EDUCATION/TRAINING PROGRAM

## 2023-08-28 RX ORDER — IPRATROPIUM BROMIDE AND ALBUTEROL SULFATE 2.5; .5 MG/3ML; MG/3ML
1 SOLUTION RESPIRATORY (INHALATION)
Status: DISCONTINUED | OUTPATIENT
Start: 2023-08-28 | End: 2023-08-29

## 2023-08-28 RX ADMIN — IPRATROPIUM BROMIDE AND ALBUTEROL SULFATE 1 DOSE: .5; 3 SOLUTION RESPIRATORY (INHALATION) at 20:04

## 2023-08-28 RX ADMIN — ENOXAPARIN SODIUM 40 MG: 100 INJECTION SUBCUTANEOUS at 08:50

## 2023-08-28 RX ADMIN — GUAIFENESIN SYRUP AND DEXTROMETHORPHAN 5 ML: 100; 10 SYRUP ORAL at 09:01

## 2023-08-28 RX ADMIN — HYDROXYZINE HYDROCHLORIDE 25 MG: 25 TABLET, FILM COATED ORAL at 08:49

## 2023-08-28 RX ADMIN — OXYCODONE HYDROCHLORIDE 10 MG: 5 TABLET ORAL at 15:35

## 2023-08-28 RX ADMIN — IPRATROPIUM BROMIDE AND ALBUTEROL SULFATE 1 DOSE: .5; 3 SOLUTION RESPIRATORY (INHALATION) at 15:27

## 2023-08-28 RX ADMIN — MIDODRINE HYDROCHLORIDE 5 MG: 5 TABLET ORAL at 08:49

## 2023-08-28 RX ADMIN — CITALOPRAM HYDROBROMIDE 20 MG: 20 TABLET ORAL at 08:49

## 2023-08-28 RX ADMIN — CLONAZEPAM 1 MG: 1 TABLET ORAL at 21:17

## 2023-08-28 RX ADMIN — IPRATROPIUM BROMIDE AND ALBUTEROL SULFATE 1 DOSE: .5; 3 SOLUTION RESPIRATORY (INHALATION) at 08:02

## 2023-08-28 RX ADMIN — ASPIRIN 81 MG: 81 TABLET, COATED ORAL at 08:49

## 2023-08-28 RX ADMIN — METOPROLOL TARTRATE 50 MG: 50 TABLET, FILM COATED ORAL at 08:50

## 2023-08-28 RX ADMIN — FUROSEMIDE 60 MG: 10 INJECTION, SOLUTION INTRAMUSCULAR; INTRAVENOUS at 21:17

## 2023-08-28 RX ADMIN — FUROSEMIDE 60 MG: 10 INJECTION, SOLUTION INTRAMUSCULAR; INTRAVENOUS at 15:28

## 2023-08-28 RX ADMIN — HYDROXYZINE HYDROCHLORIDE 25 MG: 25 TABLET, FILM COATED ORAL at 17:01

## 2023-08-28 RX ADMIN — AMIODARONE HYDROCHLORIDE 200 MG: 200 TABLET ORAL at 08:50

## 2023-08-28 RX ADMIN — SENNOSIDES AND DOCUSATE SODIUM 2 TABLET: 50; 8.6 TABLET ORAL at 09:01

## 2023-08-28 RX ADMIN — TRAZODONE HYDROCHLORIDE 75 MG: 50 TABLET ORAL at 21:17

## 2023-08-28 RX ADMIN — ACETAZOLAMIDE 250 MG: 250 TABLET ORAL at 08:50

## 2023-08-28 RX ADMIN — POTASSIUM CHLORIDE 10 MEQ: 7.46 INJECTION, SOLUTION INTRAVENOUS at 02:43

## 2023-08-28 RX ADMIN — CLONAZEPAM 1 MG: 1 TABLET ORAL at 08:49

## 2023-08-28 RX ADMIN — METOPROLOL TARTRATE 50 MG: 50 TABLET, FILM COATED ORAL at 21:18

## 2023-08-28 RX ADMIN — SODIUM CHLORIDE, PRESERVATIVE FREE 10 ML: 5 INJECTION INTRAVENOUS at 08:51

## 2023-08-28 RX ADMIN — MIDODRINE HYDROCHLORIDE 5 MG: 5 TABLET ORAL at 17:01

## 2023-08-28 RX ADMIN — POTASSIUM CHLORIDE 40 MEQ: 1500 TABLET, EXTENDED RELEASE ORAL at 08:50

## 2023-08-28 RX ADMIN — FUROSEMIDE 60 MG: 10 INJECTION, SOLUTION INTRAMUSCULAR; INTRAVENOUS at 08:50

## 2023-08-28 RX ADMIN — SODIUM CHLORIDE, PRESERVATIVE FREE 10 ML: 5 INJECTION INTRAVENOUS at 21:18

## 2023-08-28 RX ADMIN — AMIODARONE HYDROCHLORIDE 200 MG: 200 TABLET ORAL at 21:17

## 2023-08-28 RX ADMIN — ENOXAPARIN SODIUM 40 MG: 100 INJECTION SUBCUTANEOUS at 21:16

## 2023-08-28 RX ADMIN — CEFTRIAXONE SODIUM 1000 MG: 1 INJECTION, POWDER, FOR SOLUTION INTRAMUSCULAR; INTRAVENOUS at 17:04

## 2023-08-28 RX ADMIN — MIDODRINE HYDROCHLORIDE 5 MG: 5 TABLET ORAL at 12:15

## 2023-08-28 RX ADMIN — GUAIFENESIN SYRUP AND DEXTROMETHORPHAN 5 ML: 100; 10 SYRUP ORAL at 22:48

## 2023-08-28 RX ADMIN — IPRATROPIUM BROMIDE AND ALBUTEROL SULFATE 1 DOSE: .5; 3 SOLUTION RESPIRATORY (INHALATION) at 11:26

## 2023-08-28 RX ADMIN — TAMSULOSIN HYDROCHLORIDE 0.4 MG: 0.4 CAPSULE ORAL at 08:50

## 2023-08-28 RX ADMIN — OXYCODONE HYDROCHLORIDE 10 MG: 5 TABLET ORAL at 23:59

## 2023-08-28 RX ADMIN — LEVOTHYROXINE SODIUM 200 MCG: 75 TABLET ORAL at 08:49

## 2023-08-28 ASSESSMENT — ENCOUNTER SYMPTOMS
EYE PAIN: 0
COLOR CHANGE: 0
BACK PAIN: 0
ABDOMINAL DISTENTION: 1
CONSTIPATION: 1
CHEST TIGHTNESS: 1
EYE REDNESS: 0
WHEEZING: 0
APNEA: 0
SHORTNESS OF BREATH: 1
COUGH: 1
VOMITING: 0
DIARRHEA: 0
NAUSEA: 0

## 2023-08-28 ASSESSMENT — PAIN SCALES - GENERAL
PAINLEVEL_OUTOF10: 9
PAINLEVEL_OUTOF10: 8
PAINLEVEL_OUTOF10: 8
PAINLEVEL_OUTOF10: 9

## 2023-08-28 ASSESSMENT — PAIN DESCRIPTION - ORIENTATION: ORIENTATION: LEFT;RIGHT

## 2023-08-28 ASSESSMENT — PAIN DESCRIPTION - LOCATION: LOCATION: ABDOMEN;LEG

## 2023-08-28 NOTE — CARE COORDINATION
Case Management Assessment  Initial Evaluation    Date/Time of Evaluation: 8/28/2023 11:36 AM  Assessment Completed by: Rene Puente RN    If patient is discharged prior to next notation, then this note serves as note for discharge by case management. Patient Name: Blue Sparks                   YOB: 1965  Diagnosis: Hypoglycemia [E16.2]  Acute UTI [N39.0]                   Date / Time: 8/25/2023  1:58 PM    Patient Admission Status: Inpatient   Readmission Risk (Low < 19, Mod (19-27), High > 27): Readmission Risk Score: 31.5    Current PCP: Elijah Winn MD  PCP verified by CM? Chart Reviewed: Yes      History Provided by: Patient  Patient Orientation: Alert and Oriented    Patient Cognition: Alert    Hospitalization in the last 30 days (Readmission):  No    If yes, Readmission Assessment in  Navigator will be completed. Advance Directives:      Code Status: Full Code   Patient's Primary Decision Maker is: Legal Next of Kin    Primary Decision Makerlawrence Conrad Child - [phone]    Primary Decision Maker: Tamara Wheeler [facility] [phone]    Discharge Planning:    Patient lives with: Alone Type of Home: Long-Term Care  Primary Care Giver: Other (Comment)  Patient Support Systems include:     Current Financial resources:    Current community resources:    Current services prior to admission: Extended Care Placement            Current DME:              Type of Home Care services:  None    ADLS  Prior functional level: Assistance with the following:  Current functional level: Assistance with the following:    PT AM-PAC: 7 /24  OT AM-PAC: 11 /24    Family can provide assistance at DC: Would you like Case Management to discuss the discharge plan with any other family members/significant others, and if so, who?     Plans to Return to Present Housing: Yes  Other Identified Issues/Barriers to RETURNING to current housing:   Potential Assistance needed at discharge:

## 2023-08-28 NOTE — PLAN OF CARE
Neb given. Pt has loose cough. Encouraged use of is and acapella.   Problem: Respiratory - Adult  Goal: Achieves optimal ventilation and oxygenation  Flowsheets (Taken 8/28/2023 0809)  Achieves optimal ventilation and oxygenation:   Assess for changes in respiratory status   Assess the need for suctioning and aspirate as needed   Respiratory therapy support as indicated   Oxygen supplementation based on oxygen saturation or arterial blood gases   Encourage broncho-pulmonary hygiene including cough, deep breathe, incentive spirometry

## 2023-08-29 LAB
ANION GAP SERPL CALCULATED.3IONS-SCNC: ABNORMAL MMOL/L (ref 9–17)
BUN SERPL-MCNC: 24 MG/DL (ref 6–20)
CALCIUM SERPL-MCNC: 8.9 MG/DL (ref 8.6–10.4)
CHLORIDE SERPL-SCNC: 94 MMOL/L (ref 98–107)
CO2 SERPL-SCNC: >45 MMOL/L (ref 20–31)
CREAT SERPL-MCNC: 1.6 MG/DL (ref 0.7–1.2)
GFR SERPL CREATININE-BSD FRML MDRD: 49 ML/MIN/1.73M2
GLUCOSE BLD-MCNC: 151 MG/DL (ref 75–110)
GLUCOSE BLD-MCNC: 167 MG/DL (ref 75–110)
GLUCOSE BLD-MCNC: 172 MG/DL (ref 75–110)
GLUCOSE BLD-MCNC: 177 MG/DL (ref 75–110)
GLUCOSE SERPL-MCNC: 153 MG/DL (ref 70–99)
POTASSIUM SERPL-SCNC: 3.6 MMOL/L (ref 3.7–5.3)
SODIUM SERPL-SCNC: 142 MMOL/L (ref 135–144)

## 2023-08-29 PROCEDURE — 97110 THERAPEUTIC EXERCISES: CPT

## 2023-08-29 PROCEDURE — 2060000000 HC ICU INTERMEDIATE R&B

## 2023-08-29 PROCEDURE — 82947 ASSAY GLUCOSE BLOOD QUANT: CPT

## 2023-08-29 PROCEDURE — 6370000000 HC RX 637 (ALT 250 FOR IP): Performed by: NURSE PRACTITIONER

## 2023-08-29 PROCEDURE — 94640 AIRWAY INHALATION TREATMENT: CPT

## 2023-08-29 PROCEDURE — 6370000000 HC RX 637 (ALT 250 FOR IP): Performed by: INTERNAL MEDICINE

## 2023-08-29 PROCEDURE — 2580000003 HC RX 258: Performed by: STUDENT IN AN ORGANIZED HEALTH CARE EDUCATION/TRAINING PROGRAM

## 2023-08-29 PROCEDURE — 6370000000 HC RX 637 (ALT 250 FOR IP): Performed by: HOSPITALIST

## 2023-08-29 PROCEDURE — 6370000000 HC RX 637 (ALT 250 FOR IP): Performed by: STUDENT IN AN ORGANIZED HEALTH CARE EDUCATION/TRAINING PROGRAM

## 2023-08-29 PROCEDURE — 94667 MNPJ CHEST WALL 1ST: CPT

## 2023-08-29 PROCEDURE — 36415 COLL VENOUS BLD VENIPUNCTURE: CPT

## 2023-08-29 PROCEDURE — 99232 SBSQ HOSP IP/OBS MODERATE 35: CPT | Performed by: HOSPITALIST

## 2023-08-29 PROCEDURE — 80048 BASIC METABOLIC PNL TOTAL CA: CPT

## 2023-08-29 PROCEDURE — 2700000000 HC OXYGEN THERAPY PER DAY

## 2023-08-29 PROCEDURE — 94760 N-INVAS EAR/PLS OXIMETRY 1: CPT

## 2023-08-29 PROCEDURE — 99233 SBSQ HOSP IP/OBS HIGH 50: CPT | Performed by: NURSE PRACTITIONER

## 2023-08-29 PROCEDURE — 6360000002 HC RX W HCPCS: Performed by: STUDENT IN AN ORGANIZED HEALTH CARE EDUCATION/TRAINING PROGRAM

## 2023-08-29 RX ORDER — IPRATROPIUM BROMIDE AND ALBUTEROL SULFATE 2.5; .5 MG/3ML; MG/3ML
1 SOLUTION RESPIRATORY (INHALATION)
Status: DISCONTINUED | OUTPATIENT
Start: 2023-08-29 | End: 2023-08-30 | Stop reason: HOSPADM

## 2023-08-29 RX ORDER — IPRATROPIUM BROMIDE AND ALBUTEROL SULFATE 2.5; .5 MG/3ML; MG/3ML
1 SOLUTION RESPIRATORY (INHALATION)
Status: DISCONTINUED | OUTPATIENT
Start: 2023-08-29 | End: 2023-08-29

## 2023-08-29 RX ORDER — ALBUTEROL SULFATE 2.5 MG/3ML
2.5 SOLUTION RESPIRATORY (INHALATION) EVERY 4 HOURS PRN
Status: DISCONTINUED | OUTPATIENT
Start: 2023-08-29 | End: 2023-08-30 | Stop reason: HOSPADM

## 2023-08-29 RX ORDER — ACETAZOLAMIDE 250 MG/1
250 TABLET ORAL ONCE
Status: COMPLETED | OUTPATIENT
Start: 2023-08-29 | End: 2023-08-29

## 2023-08-29 RX ORDER — BUMETANIDE 1 MG/1
1 TABLET ORAL 2 TIMES DAILY
Status: DISCONTINUED | OUTPATIENT
Start: 2023-08-30 | End: 2023-08-30 | Stop reason: HOSPADM

## 2023-08-29 RX ADMIN — ACETAZOLAMIDE 250 MG: 250 TABLET ORAL at 13:30

## 2023-08-29 RX ADMIN — HYDROXYZINE HYDROCHLORIDE 25 MG: 25 TABLET, FILM COATED ORAL at 02:26

## 2023-08-29 RX ADMIN — HYDROXYZINE HYDROCHLORIDE 25 MG: 25 TABLET, FILM COATED ORAL at 11:49

## 2023-08-29 RX ADMIN — MIDODRINE HYDROCHLORIDE 5 MG: 5 TABLET ORAL at 16:57

## 2023-08-29 RX ADMIN — TAMSULOSIN HYDROCHLORIDE 0.4 MG: 0.4 CAPSULE ORAL at 09:39

## 2023-08-29 RX ADMIN — AMIODARONE HYDROCHLORIDE 200 MG: 200 TABLET ORAL at 21:48

## 2023-08-29 RX ADMIN — IPRATROPIUM BROMIDE AND ALBUTEROL SULFATE 1 DOSE: .5; 3 SOLUTION RESPIRATORY (INHALATION) at 08:37

## 2023-08-29 RX ADMIN — AMIODARONE HYDROCHLORIDE 200 MG: 200 TABLET ORAL at 09:50

## 2023-08-29 RX ADMIN — METOPROLOL TARTRATE 50 MG: 50 TABLET, FILM COATED ORAL at 09:50

## 2023-08-29 RX ADMIN — IPRATROPIUM BROMIDE AND ALBUTEROL SULFATE 1 DOSE: .5; 3 SOLUTION RESPIRATORY (INHALATION) at 00:08

## 2023-08-29 RX ADMIN — ENOXAPARIN SODIUM 40 MG: 100 INJECTION SUBCUTANEOUS at 09:37

## 2023-08-29 RX ADMIN — CEFTRIAXONE SODIUM 1000 MG: 1 INJECTION, POWDER, FOR SOLUTION INTRAMUSCULAR; INTRAVENOUS at 18:13

## 2023-08-29 RX ADMIN — MIDODRINE HYDROCHLORIDE 5 MG: 5 TABLET ORAL at 13:30

## 2023-08-29 RX ADMIN — ACETAZOLAMIDE 250 MG: 250 TABLET ORAL at 09:39

## 2023-08-29 RX ADMIN — OXYCODONE HYDROCHLORIDE 10 MG: 5 TABLET ORAL at 06:30

## 2023-08-29 RX ADMIN — ASPIRIN 81 MG: 81 TABLET, COATED ORAL at 09:39

## 2023-08-29 RX ADMIN — ENOXAPARIN SODIUM 40 MG: 100 INJECTION SUBCUTANEOUS at 21:48

## 2023-08-29 RX ADMIN — SODIUM CHLORIDE, PRESERVATIVE FREE 10 ML: 5 INJECTION INTRAVENOUS at 09:40

## 2023-08-29 RX ADMIN — IPRATROPIUM BROMIDE AND ALBUTEROL SULFATE 1 DOSE: .5; 2.5 SOLUTION RESPIRATORY (INHALATION) at 20:44

## 2023-08-29 RX ADMIN — CITALOPRAM HYDROBROMIDE 20 MG: 20 TABLET ORAL at 09:39

## 2023-08-29 RX ADMIN — METOPROLOL TARTRATE 50 MG: 50 TABLET, FILM COATED ORAL at 21:47

## 2023-08-29 RX ADMIN — LEVOTHYROXINE SODIUM 200 MCG: 75 TABLET ORAL at 06:30

## 2023-08-29 RX ADMIN — TRAZODONE HYDROCHLORIDE 75 MG: 50 TABLET ORAL at 21:47

## 2023-08-29 RX ADMIN — MIDODRINE HYDROCHLORIDE 5 MG: 5 TABLET ORAL at 10:03

## 2023-08-29 RX ADMIN — CLONAZEPAM 1 MG: 1 TABLET ORAL at 10:04

## 2023-08-29 ASSESSMENT — PAIN SCALES - GENERAL
PAINLEVEL_OUTOF10: 6
PAINLEVEL_OUTOF10: 8

## 2023-08-29 NOTE — PLAN OF CARE
Problem: Discharge Planning  Goal: Discharge to home or other facility with appropriate resources  8/29/2023 1901 by Dav Zuluaga RN  Outcome: Progressing     Problem: Safety - Adult  Goal: Free from fall injury  8/29/2023 1901 by Dav Zuluaga RN  Outcome: Progressing     Problem: Pain  Goal: Verbalizes/displays adequate comfort level or baseline comfort level  8/29/2023 1901 by Dav Zuluaga RN  Outcome: Progressing     Problem: Respiratory - Adult  Goal: Achieves optimal ventilation and oxygenation  8/29/2023 1901 by Dav Zuluaga RN  Outcome: Progressing     Problem: Skin/Tissue Integrity  Goal: Absence of new skin breakdown  Description: 1. Monitor for areas of redness and/or skin breakdown  2. Assess vascular access sites hourly  3. Every 4-6 hours minimum:  Change oxygen saturation probe site  4. Every 4-6 hours:  If on nasal continuous positive airway pressure, respiratory therapy assess nares and determine need for appliance change or resting period.   8/29/2023 1901 by Dav Zuluaga RN  Outcome: Progressing     Problem: Neurosensory - Adult  Goal: Achieves stable or improved neurological status  8/29/2023 1901 by Dav Zuluaga RN  Outcome: Progressing     Problem: Neurosensory - Adult  Goal: Absence of seizures  8/29/2023 1901 by Dav Zuluaga RN  Outcome: Progressing     Problem: Skin/Tissue Integrity - Adult  Goal: Incisions, wounds, or drain sites healing without S/S of infection  8/29/2023 1901 by Dav Zuluaga RN  Outcome: Progressing     Problem: Musculoskeletal - Adult  Goal: Return mobility to safest level of function  8/29/2023 1901 by Dav Zuluaga RN  Outcome: Progressing     Problem: Genitourinary - Adult  Goal: Absence of urinary retention  8/29/2023 1901 by Dav Zuluaga RN  Outcome: Progressing     Problem: Musculoskeletal - Adult  Goal: Return ADL status to a safe level of function  8/29/2023 1901 by Dav Zuluaga RN  Outcome: Progressing     Problem: Genitourinary - Adult  Goal: Urinary

## 2023-08-29 NOTE — PLAN OF CARE
Problem: Respiratory - Adult  Goal: Achieves optimal ventilation and oxygenation  Outcome: Progressing  Flowsheets (Taken 8/29/2023 4137)  Achieves optimal ventilation and oxygenation:   Assess for changes in respiratory status   Assess for changes in mentation and behavior   Respiratory therapy support as indicated   Assess and instruct to report shortness of breath or any respiratory difficulty   Oxygen supplementation based on oxygen saturation or arterial blood gases   Position to facilitate oxygenation and minimize respiratory effort

## 2023-08-29 NOTE — PLAN OF CARE
Problem: Pain  Goal: Verbalizes/displays adequate comfort level or baseline comfort level  Outcome: Progressing   Pt has had pain 8-9/10 for this shift, request of oxycodone and repositioning offered & provided every 2 hours. Problem: Respiratory - Adult  Goal: Achieves optimal ventilation and oxygenation  8/29/2023 0620 by Sherie Brown RN  Outcome: Progressing   Pt compliant with respiratory treatments and motivation needed for use of incentive spirometry, continuing to encourage IS use, cough & deep breathing with HOB elevated. Problem: Skin/Tissue Integrity  Goal: Absence of new skin breakdown  Description: 1. Monitor for areas of redness and/or skin breakdown  2. Assess vascular access sites hourly  3. Every 4-6 hours minimum:  Change oxygen saturation probe site  4. Every 4-6 hours:  If on nasal continuous positive airway pressure, respiratory therapy assess nares and determine need for appliance change or resting period.   Outcome: Progressing

## 2023-08-30 VITALS
WEIGHT: 315 LBS | BODY MASS INDEX: 44.1 KG/M2 | HEIGHT: 71 IN | HEART RATE: 89 BPM | RESPIRATION RATE: 20 BRPM | DIASTOLIC BLOOD PRESSURE: 53 MMHG | TEMPERATURE: 97.3 F | SYSTOLIC BLOOD PRESSURE: 110 MMHG | OXYGEN SATURATION: 98 %

## 2023-08-30 LAB
ANION GAP SERPL CALCULATED.3IONS-SCNC: 6 MMOL/L (ref 9–17)
BUN SERPL-MCNC: 25 MG/DL (ref 6–20)
CALCIUM SERPL-MCNC: 8.8 MG/DL (ref 8.6–10.4)
CHLORIDE SERPL-SCNC: 92 MMOL/L (ref 98–107)
CO2 SERPL-SCNC: 42 MMOL/L (ref 20–31)
CREAT SERPL-MCNC: 1.6 MG/DL (ref 0.7–1.2)
GFR SERPL CREATININE-BSD FRML MDRD: 49 ML/MIN/1.73M2
GLUCOSE BLD-MCNC: 156 MG/DL (ref 75–110)
GLUCOSE BLD-MCNC: 184 MG/DL (ref 75–110)
GLUCOSE SERPL-MCNC: 169 MG/DL (ref 70–99)
POTASSIUM SERPL-SCNC: 3.6 MMOL/L (ref 3.7–5.3)
SODIUM SERPL-SCNC: 140 MMOL/L (ref 135–144)

## 2023-08-30 PROCEDURE — 6370000000 HC RX 637 (ALT 250 FOR IP): Performed by: STUDENT IN AN ORGANIZED HEALTH CARE EDUCATION/TRAINING PROGRAM

## 2023-08-30 PROCEDURE — 36415 COLL VENOUS BLD VENIPUNCTURE: CPT

## 2023-08-30 PROCEDURE — 80048 BASIC METABOLIC PNL TOTAL CA: CPT

## 2023-08-30 PROCEDURE — 94668 MNPJ CHEST WALL SBSQ: CPT

## 2023-08-30 PROCEDURE — 6370000000 HC RX 637 (ALT 250 FOR IP): Performed by: HOSPITALIST

## 2023-08-30 PROCEDURE — 97530 THERAPEUTIC ACTIVITIES: CPT

## 2023-08-30 PROCEDURE — 82947 ASSAY GLUCOSE BLOOD QUANT: CPT

## 2023-08-30 PROCEDURE — 2700000000 HC OXYGEN THERAPY PER DAY

## 2023-08-30 PROCEDURE — 6370000000 HC RX 637 (ALT 250 FOR IP): Performed by: INTERNAL MEDICINE

## 2023-08-30 PROCEDURE — 6370000000 HC RX 637 (ALT 250 FOR IP): Performed by: NURSE PRACTITIONER

## 2023-08-30 PROCEDURE — 94640 AIRWAY INHALATION TREATMENT: CPT

## 2023-08-30 PROCEDURE — 6360000002 HC RX W HCPCS: Performed by: STUDENT IN AN ORGANIZED HEALTH CARE EDUCATION/TRAINING PROGRAM

## 2023-08-30 PROCEDURE — 99239 HOSP IP/OBS DSCHRG MGMT >30: CPT | Performed by: HOSPITALIST

## 2023-08-30 PROCEDURE — 94761 N-INVAS EAR/PLS OXIMETRY MLT: CPT

## 2023-08-30 PROCEDURE — 2580000003 HC RX 258: Performed by: STUDENT IN AN ORGANIZED HEALTH CARE EDUCATION/TRAINING PROGRAM

## 2023-08-30 RX ORDER — OXYCODONE HYDROCHLORIDE 10 MG/1
10 TABLET ORAL EVERY 4 HOURS PRN
Qty: 6 TABLET | Refills: 0 | Status: SHIPPED | OUTPATIENT
Start: 2023-08-30 | End: 2023-09-29

## 2023-08-30 RX ORDER — CLONAZEPAM 0.5 MG/1
.5-1 TABLET ORAL SEE ADMIN INSTRUCTIONS
Qty: 20 TABLET | Refills: 0 | Status: SHIPPED | OUTPATIENT
Start: 2023-08-30 | End: 2023-09-29

## 2023-08-30 RX ADMIN — ENOXAPARIN SODIUM 40 MG: 100 INJECTION SUBCUTANEOUS at 09:02

## 2023-08-30 RX ADMIN — CLONAZEPAM 1 MG: 1 TABLET ORAL at 09:00

## 2023-08-30 RX ADMIN — ASPIRIN 81 MG: 81 TABLET, COATED ORAL at 09:00

## 2023-08-30 RX ADMIN — SENNOSIDES AND DOCUSATE SODIUM 2 TABLET: 50; 8.6 TABLET ORAL at 09:08

## 2023-08-30 RX ADMIN — IPRATROPIUM BROMIDE AND ALBUTEROL SULFATE 1 DOSE: .5; 2.5 SOLUTION RESPIRATORY (INHALATION) at 09:22

## 2023-08-30 RX ADMIN — HYDROXYZINE HYDROCHLORIDE 25 MG: 25 TABLET, FILM COATED ORAL at 09:00

## 2023-08-30 RX ADMIN — LEVOTHYROXINE SODIUM 200 MCG: 75 TABLET ORAL at 06:49

## 2023-08-30 RX ADMIN — ACETAZOLAMIDE 250 MG: 250 TABLET ORAL at 09:01

## 2023-08-30 RX ADMIN — AMIODARONE HYDROCHLORIDE 200 MG: 200 TABLET ORAL at 09:01

## 2023-08-30 RX ADMIN — SODIUM CHLORIDE, PRESERVATIVE FREE 10 ML: 5 INJECTION INTRAVENOUS at 09:04

## 2023-08-30 RX ADMIN — OXYCODONE HYDROCHLORIDE 10 MG: 5 TABLET ORAL at 04:51

## 2023-08-30 RX ADMIN — BUMETANIDE 1 MG: 1 TABLET ORAL at 09:00

## 2023-08-30 RX ADMIN — CITALOPRAM HYDROBROMIDE 20 MG: 20 TABLET ORAL at 09:00

## 2023-08-30 RX ADMIN — METOPROLOL TARTRATE 50 MG: 50 TABLET, FILM COATED ORAL at 09:01

## 2023-08-30 RX ADMIN — TAMSULOSIN HYDROCHLORIDE 0.4 MG: 0.4 CAPSULE ORAL at 09:01

## 2023-08-30 RX ADMIN — MIDODRINE HYDROCHLORIDE 5 MG: 5 TABLET ORAL at 11:45

## 2023-08-30 RX ADMIN — MIDODRINE HYDROCHLORIDE 5 MG: 5 TABLET ORAL at 09:02

## 2023-08-30 ASSESSMENT — PAIN DESCRIPTION - ORIENTATION: ORIENTATION: RIGHT

## 2023-08-30 ASSESSMENT — PAIN DESCRIPTION - LOCATION: LOCATION: FLANK

## 2023-08-30 ASSESSMENT — PAIN SCALES - GENERAL: PAINLEVEL_OUTOF10: 7

## 2023-08-30 NOTE — CARE COORDINATION
D/C order placed-    Transport requested to [address] Alexander Hernandez @ [facility]    # for Report [phone]

## 2023-08-30 NOTE — DISCHARGE SUMMARY
[facility]  Office: [address], DO, Joe Gutierrez, DO, Nunu Senate, DO, Merrick Dey Blood, DO, Vijay Garrison MD, Barbara Barrett MD, Gurdeep Norman MD, Colonel Titi MD,  Shira Pedersen MD, Yobani Holland MD, Forest Beck, DO, Laz Wang MD,  Kathrine Klinefelter, MD, Liza Mcfadden MD, Baltazar Amado DO, Rosas Au MD,  Liliane Hester, DO, Chau Rodriguez MD, Leni Domingo MD, Josie Leslie MD, Reed Nash MD,  Monster Evans MD, Ike Brand MD, Peng Wang MD, Michelle Munson DO, Jose Sandy MD,  Sascha Trevino MD, Prashanth Byrd, CNP,  Jamshid Kearns, CNP,, Sebastian Barnes, CNP,  Anup Harp, [facility], Ele Krueger, CNP, Shanta Astudillo, CNP, Casey Carvajal, CNP, Magan Franks, CNP, Claudia Grewal, CNP, Destiney Powers, CNP, Raven Westbrook PA-C, Sakina Serna, CNS, Jennifer Copeland, [facility], [facility], [address] [place] [address]    Discharge Summary     Patient ID: Marcia Kelly  :  1965   MRN: [id number]     ACCOUNT:  [de-identified]   Patient's PCP: Radha Brock MD  Admit Date: 2023   Discharge Date: 2023  Length of Stay: 5  Code Status:  Full Code  Admitting Physician: Liza Mcfadden MD  Discharge Physician: Tamela Knight DO     Active Discharge Diagnoses:     Hospital Problem Lists:  Principal Problem:    Acute on chronic diastolic heart failure [facility])  Active Problems:    Alcoholic cirrhosis of liver [facility]), sober since     Bipolar disorder [facility])    Type 2 diabetes mellitus with diabetic neuropathy, with long-term current use of insulin [facility])    Essential hypertension, currently hypotensive    FABI (obstructive sleep apnea)    Type 2 diabetes mellitus with diabetic neuropathy ([address] [facility])    Acquired hypothyroidism    Slow transit constipation    Gastroesophageal reflux disease without esophagitis    Portal hypertension ([address] [facility])    Obesity, morbid, BMI 50 or higher ([address])    Venous

## 2023-08-30 NOTE — PROGRESS NOTES
[facility]  PROGRESS NOTE    Room # 253/911-34   Name: Jey Torres            Cheondoism: Major Brighter      Reason for visit: Routine    I visited the  patient . Admit Date & Time: 8/25/2023  1:58 PM    Assessment:  Jey Torres is a 60 y.o. male in the hospital because \"heart failure\". Upon entering the room patient was lying in bed. Patient did no express any needs or concerns to this writer. Patient appeared to be quite tired. Intervention:  I introduced myself and my title as  I offered space for patient  to express feelings, needs, and concerns and provided a ministry presence. This writer engaged on brief conversation with pt. This writer assured patient of his prayers    Outcome:  Patient expressed gratitude to this writer for visiting     Plan:  Chaplains will remain available to offer spiritual and emotional support as needed. Electronically signed by Catalina Howard on 8/30/2023 at 12:29 PM.  [id number] [place] [address] [address]        08/30/23 1227   Encounter Summary   Encounter Overview/Reason  Initial Encounter   Service Provided For: Patient   Referral/Consult From: Ramy   Last Encounter  08/30/23   Complexity of Encounter Low   Begin Time 1210   End Time  1215   Total Time Calculated 5 min   Spiritual/Emotional needs   Type Spiritual Support   Assessment/Intervention/Outcome   Assessment Calm;Passive; Impaired social interaction   Intervention Active listening;Sustaining Presence/Ministry of presence   Outcome Engaged in conversation;Expressed Gratitude
[facility]  Office: [address], DO, Irene Esqueda, DO, Say Cruz, DO, Meme Ji Blood, DO, July Burgess MD, Sim Guerrero MD, Madhuri Hartley MD, Kallie Benjamin MD,  Randi Luna MD, Felicita Tello MD, Dylan Eller, DO, Neo Redd MD,  John Alvarez MD, Camille Swanson MD, Charlesetta Burkitt, DO, Chato Bain MD,  Francia Davenport DO, Shanta Daniel MD, Rao Ruvalcaba MD, Cezar De La Torre MD, Linnette Hernandez MD,  Yong Bamberger, MD, Roberta Beaulieu MD, Debi Lawler MD, Lauren Ren DO, Viviana Cummins MD,  Rebekah Aguilar MD, Ashley Hackett, CNP,  Lui Morgan, CNP,, Aydee Wheeler, CNP,  Toi Lemon, [facility], Rea Dalton, CNP, Ludmila Holland, CNP, Taryn Kauffman, CNP, Ragena Rubinstein, CNP, Christophe Michel, CNP, Caleb Kelly, CNP, Wanda Bergman PA-C, Dennis Galvan, ANISH, Frida Brown, CNP, [facility]    Progress Note    8/28/2023    1:07 PM    Name:   Twin Saab  MRN:     [id number]     Acct:      [de-identified]   Room:   [address] [place] [address] Day:  3  Admit Date:  8/25/2023  1:58 PM    PCP:   Dallas Hinds MD  Code Status:  Full Code    Subjective:     C/C:   Chief Complaint   Patient presents with    Hypoglycemia     Pt at [facility], just discharged from hospital. Staff found pt to be unresponsive and BS 47, given 2 glucogon at facility, ems gave 250 ml of d10, last bs 113, pt now awake and oriented. Interval History Status: improved. Pt complains of anxiousness today. Still has leg swelling b.l.     Brief History:     60year old male with past medical history of Diabetes type 2, paroxysmal afib not on anticoagulation due to GI bleed, Lymphedema, depression presents from SNF due to unresponsiveness and hypoglycemia.  Patient required multiple boluses of D5 to improve mentation and glucse also admitted for acute on chronic diastolic heart failure requiring IV
Dr. Marilia Cordero paged and informed that patient is complaining of increased shortness of breath with increased moist cough. Also, upon being placed on telemetry, patient appears to be in afib. Dr. Marilia Cordero orders lasix 40mg IV BID and places parameters on metoprolol. He is informed that patient is on lovenox when asked about an anticoagulant. He states he will see patient tomorrow.
Glucose 103 at this time - Result not uploaded
[facility]  Office: [address], DO, Guille Curet, DO, Josh Kramers, DO, Pema [place] Blood, DO, Matilda Real MD, Familia Andrade MD, Claudia Arreola MD, Roxy Palma MD,  Sagar Gagnon MD, Mitch Marie MD, Analilia Pace, DO, Willam Alvarez MD,  Diego Weller MD, Ijeoma Main MD, Suri Harper, DO, Cayetano Suresh MD,  Tiana Smith DO, Shiv Guzman MD, Jessica De La Torre MD, Leidy Hauser MD, Kalin Zepeda MD,  Victoria Osman MD, Manuel Krishnamurthy MD, Ariadna Dimas MD, Irish Sarabia, DO, Philis Halsted, MD,  Jaye Carrillo MD, Shantal Proctor, CNP,  Whitney Aguiar, CNP,, Tyler Cardona, CNP,  Cheryle Balding, DNP, Valdemar Peabody, CNP, Marianne Scottegal, CNP, Zehra Em, CNP, Manju Woodward, CNP, Lucia Valdivia, CNP, Rachael Samson, CNP, Ernestina Farris PADaC, Shayla Islas, CNS, Caleb Cheng, CNP, Jan Gutierres    Progress Note    8/27/2023    12:27 PM    Name:   Chrissy Paniagua  MRN:     [id number]     Acct:      [de-identified]   Room:   [address] [place] [address] Day:  2  Admit Date:  8/25/2023  1:58 PM    PCP:   Sarah Peralta MD  Code Status:  Full Code    Subjective:     C/C:   Chief Complaint   Patient presents with    Hypoglycemia     Pt at [facility], just discharged from hospital. Staff found pt to be unresponsive and BS 47, given 2 glucogon at facility, ems gave 250 ml of d10, last bs 113, pt now awake and oriented. Interval History Status: improved. Paitent seen and examined. Still having shortness of breath, still having non productive cough, abdomen distended per patient, notices incresed leg swelling. Requiring nasal cannula oxygen. Brief History:     60year old male with past medical history of Diabetes type 2, paroxysmal afib not on anticoagulation due to GI bleed, Lymphedema, depression presents from SNF due to unresponsiveness and hypoglycemia.  Patient required
[facility]  Office: [phone]  Shannon Reid, DO, Harshal Ba, DO, Pb Becerra, DO, Chris Jeknins Blood, DO, Tobias Cuba MD, Severo Lopes, MD, Esperanza Rivera MD, Cari Pretty MD,  Sina Romero MD, Brian Rausch MD, Jessica Boggs DO, Vianey Bansal MD,  John Loredo MD, Debbie Rai MD, Geovanna Rossi DO, Arturo Aguirre MD,  Dianelys Chen MD, Sharda Jara MD, Harmony Benavidez MD, Laura Buchanan MD,  David Pizarro MD, Kathy Davis MD, Phil Lord MD, Des Otero DO, Jorgito Chamberlain MD,  Ankit Astorga MD, Celso Posada, CNP,  Doug Davis, CNP,, Denny Singh, CNP,  Prateek Paez, [facility], Neftaly Joya, CNP, Elizabeth Farmer, CNP, Enio Patel, CNP, Candido Moore, CNP, Bryan Rangel, CNP, Loreto Nickerson, CNP, Tadeo Pittman PA-C, Elliott Duffy, CNS, Morales Gonzalez, [facility], Conor Roy, [address] [place] [address]    Progress Note    8/29/2023    1:56 PM    Name:   Niels Mayorga  MRN:     [id number]     Acct:      [de-identified]   Room:   [address] [place] [address] Day:  4  Admit Date:  8/25/2023  1:58 PM    PCP:   Toñito Packer MD  Code Status:  Full Code    Subjective:     Patient seen in follow-up for hypoglycemia with associated acute on chronic diastolic heart failure. Patient states \"I am tired\"    Patient has multiple complaints majority of which are secondary to his chronic debility and weakness. He has been on 60 mg of Lasix 3 times daily and has had a net output of approximately 12.6 L. Morning labs show that he is developing a bit of contraction alkalosis and I am going to discontinue the IV Lasix at this point in time. He is on daily Diamox and I am going to provide him with additional dose of Diamox today. We will check repeat labs in the morning and resume his home dosing of Bumex tomorrow.   Although the patient is still markedly fluid overloaded I explained to him that this is
[facility]   Progress Note                   Date:   8/29/2023  Patient name: Mary Gordillo  Date of admission:  8/25/2023  1:58 PM  MRN:   [id number]  YOB: 1965  PCP: Mary Soriano MD    Reason for Admission: Hypoglycemia [E16.2]  Acute UTI [N39.0]    Subjective:       Clinical Changes / Abnormalities: Patient seen and examined in room. No acute CV events overnight. Labs, vitals, and tele reviewed.     -11.6 L since admission   Medications:   Scheduled Meds:   ipratropium 0.5 mg-albuterol 2.5 mg  1 Dose Inhalation 4x Daily RT    insulin lispro  0-8 Units SubCUTAneous TID WC    insulin lispro  0-4 Units SubCUTAneous Nightly    midodrine  5 mg Oral TID WC    furosemide  60 mg IntraVENous TID    acetaZOLAMIDE  250 mg Oral Daily    polyethylene glycol  17 g Oral Daily    levothyroxine  200 mcg Oral Daily    amiodarone  200 mg Oral BID    aspirin  81 mg Oral Daily    [Held by provider] bumetanide  1 mg Oral BID    citalopram  20 mg Oral Daily    metoprolol tartrate  50 mg Oral BID    tamsulosin  0.4 mg Oral Daily    traZODone  75 mg Oral Nightly    sodium chloride flush  5-40 mL IntraVENous 2 times per day    enoxaparin  40 mg SubCUTAneous BID    cefTRIAXone (ROCEPHIN) IV  1,000 mg IntraVENous Q24H    dextrose  25 g IntraVENous Once     Continuous Infusions:   dextrose      sodium chloride       CBC:   Recent Labs     08/27/23  0613 08/28/23  0550   WBC 6.1 5.8   HGB 8.8* 8.3*    119*       BMP:    Recent Labs     08/27/23  0613 08/27/23  2303 08/28/23  0550     --  143   K 3.6* 3.1* 3.1*   CL 94*  --  94*   CO2 33*  --  39*   BUN 31*  --  25*   CREATININE 1.4*  --  1.4*   GLUCOSE 82  --  153*       Hepatic:   No results for input(s): AST, ALT, ALB, BILITOT, ALKPHOS in the last 72 hours. Troponin: No results for input(s): TROPHS in the last 72 hours. BNP: No results for input(s): BNP in the last 72 hours.   Lipids: No results for input(s): CHOL, HDL in the last 72
Occupational [address]  Occupational Therapy Not Seen Note    DATE: 2023    NAME: Mary Gordillo  MRN: [id number]   : 1965      Patient not seen this date for Occupational Therapy due to:    Patient Declined: pt declined Therapy eval this date. Attempted education with continued declination of services. Will continue to pursue as able.     Next Scheduled Treatment:  as able    Electronically signed by Lam Rizvi on 2023 at 2:29 PM
Patient refused to be repositioned throughout shift stating he \"does not feel good\" and he is \"comfortable the way he is\". He refuses to roll in bed as the bed is too small for him due to his weight/size. Bariatric bed ordered by house supervisor and arrived this evening. Patient to be moved when staff available to safely move him.
Physical Therapy        Physical Therapy Cancel Note      DATE: 2023    NAME: Blue Sparks  MRN: [id number]   : 1965      Patient not seen this date for Physical Therapy due to:    Patient Declined: RN OK therapy evaluation. PT/OT visit patient room. Patient immediately decline therapy evaluations. Therapist educate pt on reason for therapy evaluation, but pt repeat decline therapy evaluations. Patient report he is willing to be evaluated tomorrow. PLAN: continue to pursue PT evaluation as able.        Electronically signed by Barbara Mendoza PT on 2023 at 2:29 PM
Physical Therapy  Facility/Department: [facility] MED SURG ICU  Physical Therapy Initial Assessment    Name: Gladis Wesley  : 1965  MRN: [id number]  Date of Service: 2023  Chief Complaint   Patient presents with    Hypoglycemia     Pt at [facility], just discharged from hospital. Staff found pt to be unresponsive and BS 47, given 2 glucogon at facility, ems gave 250 ml of d10, last bs 113, pt now awake and oriented. Discharge Recommendations:  Patient would benefit from continued therapy after discharge          Patient Diagnosis(es): The primary encounter diagnosis was Hypoglycemia. A diagnosis of Acute UTI was also pertinent to this visit. Past Medical History:  has a past medical history of A-fib ([address]), Anxiety and depression, Back pain, chronic, BPH (benign prostatic hyperplasia), Cellulitis of left lower extremity, Cellulitis of right lower extremity, CHF (congestive heart failure) ([address]), Chronic respiratory failure with hypoxia ([address]), Cirrhosis ([address]), Diabetes mellitus ([address]), Epididymoorchitis, GERD (gastroesophageal reflux disease), H/O cardiac catheterization, Hepatitis, Hiatal hernia, History of alcohol abuse, Hyperlipidemia, Hypertension, IBS (irritable bowel syndrome), Incisional hernia with obstruction but no gangrene, Klebsiella sepsis ([address]), Metabolic encephalopathy, Morbid obesity ([address]), Neuropathy, On home oxygen therapy, On home oxygen therapy, Pancreatitis, Poisoning by insulin and oral hypoglycemic (antidiabetic) drugs, accidental (unintentional), initial encounter, Primary osteoarthritis of right knee, Retention, urine, SBO (small bowel obstruction) ([address]), Secondary hypercoagulable state ([address]), Septic shock ([address]), Sleep apnea, Sleep apnea, obstructive, Thyroid disease, Under care of team, Urinary bladder neurogenic dysfunction, and Urinary tract infection associated with indwelling urethral catheter ([address]).   Past Surgical History:  has a past surgical history that includes
Physical Therapy  Facility/Department: [facility] ICU  Treatment Note    Name: Melony Ceja  : 1965  MRN: [id number]  Date of Service: 2023    Discharge Recommendations:  Patient would benefit from continued therapy after discharge   PT Equipment Recommendations  Equipment Needed: No      Patient Diagnosis(es): The primary encounter diagnosis was Hypoglycemia. Diagnoses of Acute UTI, Bipolar disorder, in partial remission, most recent episode depressed ([address]), and Wounds, multiple were also pertinent to this visit. Past Medical History:  has a past medical history of A-fib ([address]), Anxiety and depression, Back pain, chronic, BPH (benign prostatic hyperplasia), Cellulitis of left lower extremity, Cellulitis of right lower extremity, CHF (congestive heart failure) ([address]), Chronic respiratory failure with hypoxia ([address]), Cirrhosis ([address]), Diabetes mellitus ([address]), Epididymoorchitis, GERD (gastroesophageal reflux disease), H/O cardiac catheterization, Hepatitis, Hiatal hernia, History of alcohol abuse, Hyperlipidemia, Hypertension, IBS (irritable bowel syndrome), Incisional hernia with obstruction but no gangrene, Klebsiella sepsis ([address]), Metabolic encephalopathy, Morbid obesity ([address]), Neuropathy, On home oxygen therapy, On home oxygen therapy, Pancreatitis, Poisoning by insulin and oral hypoglycemic (antidiabetic) drugs, accidental (unintentional), initial encounter, Primary osteoarthritis of right knee, Retention, urine, SBO (small bowel obstruction) ([address]), Secondary hypercoagulable state ([address]), Septic shock ([address]), Sleep apnea, Sleep apnea, obstructive, Thyroid disease, Under care of team, Urinary bladder neurogenic dysfunction, and Urinary tract infection associated with indwelling urethral catheter ([address] Past Surgical History:  has a past surgical history that includes Colonoscopy;  Abdomen surgery; hernia repair; Endoscopy, colon, diagnostic; Upper gastrointestinal endoscopy (2017); pr egd
[facility]   Progress Note                   Date:   8/28/2023  Patient name: Ankit Severino  Date of admission:  8/25/2023  1:58 PM  MRN:   [id number]  YOB: 1965  PCP: Ramo Valdez MD    Reason for Admission: Hypoglycemia [E16.2]  Acute UTI [N39.0]    Subjective:       Clinical Changes / Abnormalities: Pt seen and examined in the room. Pt denies any CP or sob.      -11.6 L since admission   Medications:   Scheduled Meds:   ipratropium 0.5 mg-albuterol 2.5 mg  1 Dose Inhalation Q4H RT    insulin lispro  0-8 Units SubCUTAneous TID WC    insulin lispro  0-4 Units SubCUTAneous Nightly    midodrine  5 mg Oral TID WC    furosemide  60 mg IntraVENous TID    acetaZOLAMIDE  250 mg Oral Daily    polyethylene glycol  17 g Oral Daily    levothyroxine  200 mcg Oral Daily    amiodarone  200 mg Oral BID    aspirin  81 mg Oral Daily    [Held by provider] bumetanide  1 mg Oral BID    citalopram  20 mg Oral Daily    metoprolol tartrate  50 mg Oral BID    tamsulosin  0.4 mg Oral Daily    traZODone  75 mg Oral Nightly    sodium chloride flush  5-40 mL IntraVENous 2 times per day    enoxaparin  40 mg SubCUTAneous BID    cefTRIAXone (ROCEPHIN) IV  1,000 mg IntraVENous Q24H    dextrose  25 g IntraVENous Once     Continuous Infusions:   dextrose      sodium chloride       CBC:   Recent Labs     08/26/23  0809 08/27/23  0613 08/28/23  0550   WBC 9.2 6.1 5.8   HGB 9.1* 8.8* 8.3*    144 119*     BMP:    Recent Labs     08/26/23  0809 08/27/23  0613 08/27/23  2303 08/28/23  0550    137  --  143   K 4.2 3.6* 3.1* 3.1*   CL 93* 94*  --  94*   CO2 37* 33*  --  39*   BUN 37* 31*  --  25*   CREATININE 1.3* 1.4*  --  1.4*   GLUCOSE 25* 82  --  153*     Hepatic:   Recent Labs     08/25/23  1400   AST 15   ALT 13   BILITOT 0.3   ALKPHOS 177*     Troponin: No results for input(s): TROPHS in the last 72 hours. BNP: No results for input(s): BNP in the last 72 hours.   Lipids: No results for input(s): CHOL,
[facility] Wound Ostomy  Nurse  Consult Note       NAME:  Melanie Jordan  MEDICAL RECORD NUMBER:  [id number]  AGE: 60 y.o. GENDER: male  : 1965  TODAY'S DATE:  2023    Subjective   Reason for [facility] Nurse Evaluation and Assessment: lower leg edema. History of Incontinence Associated Dermatitis, irritant contact dermatitis due to  fecal and urinary incontinence and Intertrigo to abdominal pannus. Melanie Jordan is a 60 y.o. male referred by:   [x] Physician  [] Nursing  [] Other:     Known from recent hospitalization. Skin was much improved  evidenced by wound photos. Buttock skin is intact with dry flaky patches; Abdominal , groin, abdominal  and knee folds continue with some open areas due to excessive moisture and friction. Admitted with CHF exacerbation with lower leg edema. Ace wraps have been applied bilaterally upon Cardiology recommendation. PAST MEDICAL HISTORY        Diagnosis Date    A-fib ([address]) 2023    Anxiety and depression     Back pain, chronic     BPH (benign prostatic hyperplasia)     Cellulitis of left lower extremity 2017    Cellulitis of right lower extremity 2017    CHF (congestive heart failure) (HCC)     Chronic respiratory failure with hypoxia ([address])     prn    Cirrhosis ([address])     Diabetes mellitus ([address])     not checking bloodsugar at home    Epididymoorchitis     GERD (gastroesophageal reflux disease)     H/O cardiac catheterization not sure of date    no stents    Hepatitis     Pt does not know the type.      Hiatal hernia     History of alcohol abuse     Sober since     Hyperlipidemia     not taking any medication    Hypertension     IBS (irritable bowel syndrome)     Incisional hernia with obstruction but no gangrene 2018    Klebsiella sepsis ([address])     Metabolic encephalopathy     Morbid obesity ([address])     Neuropathy     feet,toes    On home oxygen therapy     DME for home oxgygen at 2.5 lpm at HS and as needed    On home
[facility]  Office: [phone]  Annelise Chambers, DO, Evin Tse, DO, Isaak Newman, DO, Zaira Anderson DO, Shabana Martin MD, Kamala Melchor MD, Michael Wesley MD, Jayde Oropeza MD,  Josie Mejia MD, Mk Kay MD, Jessi Fernandez, DO, Pricilla Blackburn MD,  Debbie Dietz MD, Sasha Booth MD, Sarah Beth Lane DO, Gustavus Najjar, MD,  Isak Lund DO, Jean Claude Cerda MD, Gerald Kanner, MD, Kaiden Espinoza MD, Glenna Perez MD,  Larissa Arrington MD, Kaleigh Nickerson MD, Neeraj Castro MD, Cookie Brittle, DO, Harmony Au MD,  Geetha Mitchell MD, Ewelina Gil, CNP,  Carley Santos, CNP,, Steffany Cronin, CNP,  Mal Villalta, [facility], Oliverio Wahl, CNP, Ad Singleton, CNP, Hansel Angelucci, CNP, Ijeoma Tierney, CNP, Isabelle Ch, CNP, Zeina Norris, CNP, Rivka Cortés PA-C, Manjinder Rosenbaum, CNS, Cristal Connelly, CNP, Marino Burt, [address] [place] [address]    Progress Note    8/30/2023    11:38 AM    Name:   Leitha Carrel  MRN:     [id number]     Acct:      [de-identified]   Room:   [address] [place] [address] Day:  5  Admit Date:  8/25/2023  1:58 PM    PCP:   Marlo Cervantes MD  Code Status:  Full Code    Subjective:     Patient seen in follow-up for acute on chronic diastolic congestive heart failure. Patient states \"I am tired\"    Patient has multiple chronic issues again today. He states that he does not feel well. I once again reiterated to him that he is suffering from a massive amount of fluid overload. He developed a contraction alkalosis secondary to aggressive diuresis. Unfortunately it will take time for his body to acclimate and diurese further. I explained to him that unfortunately the water weight that he has took months if not years to develop and will take a very long time to improve. He has multiple wounds that are being treated due to his profound overload. That said he is 12 L net fluid output.   He is hemodynamically
Therapeutic activities, Positioning  Safety Devices  Type of Devices: Call light within reach, Left in bed  Restraints  Restraints Initially in Place: No     Restrictions  Restrictions/Precautions  Restrictions/Precautions: General Precautions, Fall Risk, Isolation  Required Braces or Orthoses?: No  Position Activity Restriction  Other position/activity restrictions: up with assistance; bariatric bed; increased BMI     Subjective   General  Patient assessed for rehabilitation services?: Yes  Family / Caregiver Present: No  Subjective  Subjective: Pt only agreeable to bed level exercises, declined to sit on edge of bed. Pt reports having 10/10 BLE pain; PT attempted to reposition BLE for comfort. Cognition   Orientation  Overall Orientation Status: Impaired  Orientation Level: Disoriented to time  Cognition  Overall Cognitive Status: Exceptions  Following Commands: Follows one step commands with increased time; Follows one step commands with repetition  Attention Span: Attends with cues to redirect  Memory: Decreased recall of recent events  Safety Judgement: Decreased awareness of need for safety  Problem Solving: Assistance required to identify errors made;Assistance required to generate solutions;Assistance required to implement solutions;Assistance required to correct errors made;Decreased awareness of errors  Initiation: Requires cues for some  Sequencing: Requires cues for some  Cognition Comment: multiple cues given to redirect pt to  task at hand     Objective      Observation/Palpation  Posture: Poor (LLE externally rotated (per pt, this is chronic))  Observation: lower legs ace wrapped  Gross Assessment  AROM: Grossly decreased, non-functional  Strength: Grossly decreased, non-functional  Coordination: Generally decreased, functional  Tone: Normal            Bed mobility  Bed Mobility Comments: pt declined to sit on edge of bed, only agreeable to bed level BLE ex      Exercise Treatment: supine
Short Term Goals: 14 days pt will  Short Term Goal 1: UB ADL setup  Short Term Goal 2: UB strength to 5/5 to assist with bed mobility and ADLs  Short Term Goal 3: Bed mobility to Min assist to CGA with sitting balance to F+  Short Term Goal 4: ADL to[place]rance to 10 minutes for safe completion of tasks  Short Term Goal 5: ADL transfers to max assist  Patient Goals   Patient goals : remain in bed and get some rest       Therapy Time   Individual Concurrent Group Co-treatment   Time In 1323         Time Out 1351         Minutes 28         Timed Code Treatment Minutes: [address], OTR/L
solutions;Assistance required to implement solutions;Assistance required to correct errors made  Insights: Decreased awareness of deficits  Initiation: Requires cues for all  Sequencing: Requires cues for some  Orientation  Overall Orientation Status: Within Functional Limits                  Education Given To: Patient  Education Provided: Transfer Training;Role of Therapy;Plan of Care;Energy Conservation;Precautions; ADL Adaptive Strategies  Education Method: Verbal  Barriers to Learning:  (motivation and moderate SOB)  Education Outcome: Continued education needed           Hand Dominance  Hand Dominance: Right      AM-PAC Score      Raw Score 11            Goals  Short Term Goals  Time Frame for Short Term Goals: 14 days pt will  Short Term Goal 1: UB ADL setup  Short Term Goal 2: UB strength to 5/5 to assist with bed mobility and ADLs  Short Term Goal 3: Bed mobility to Min assist to CGA with sitting balance to F+  Short Term Goal 4: ADL toelrance to 10 minutes for safe completion of tasks  Short Term Goal 5: ADL transfers to max assist  Patient Goals   Patient goals : to watch baseball       Therapy Time   Individual Concurrent Group Co-treatment   Time In 1419         Time Out 1442         Minutes 23         Timed Code Treatment Minutes: Bennett Mcbride OT

## 2023-08-30 NOTE — PLAN OF CARE
Problem: Respiratory - Adult  Goal: Achieves optimal ventilation and oxygenation  8/29/2023 2050 by Yue Avila RCP  Outcome: Progressing  Flowsheets (Taken 8/29/2023 2050)  Achieves optimal ventilation and oxygenation:   Assess for changes in respiratory status   Assess the need for suctioning and aspirate as needed   Respiratory therapy support as indicated   Assess for changes in mentation and behavior   Oxygen supplementation based on oxygen saturation or arterial blood gases   Encourage broncho-pulmonary hygiene including cough, deep breathe, incentive spirometry   Assess and instruct to report shortness of breath or any respiratory difficulty

## 2023-08-30 NOTE — PLAN OF CARE
Problem: Discharge Planning  Goal: Discharge to home or other facility with appropriate resources  Outcome: Adequate for Discharge     Problem: Safety - Adult  Goal: Free from fall injury  Outcome: Adequate for Discharge     Problem: Pain  Goal: Verbalizes/displays adequate comfort level or baseline comfort level  Outcome: Adequate for Discharge     Problem: Respiratory - Adult  Goal: Achieves optimal ventilation and oxygenation  Outcome: Adequate for Discharge     Problem: Skin/Tissue Integrity  Goal: Absence of new skin breakdown  Description: 1. Monitor for areas of redness and/or skin breakdown  2. Assess vascular access sites hourly  3. Every 4-6 hours minimum:  Change oxygen saturation probe site  4. Every 4-6 hours:  If on nasal continuous positive airway pressure, respiratory therapy assess nares and determine need for appliance change or resting period.   Outcome: Adequate for Discharge     Problem: Neurosensory - Adult  Goal: Achieves stable or improved neurological status  Outcome: Adequate for Discharge  Goal: Absence of seizures  Outcome: Adequate for Discharge  Goal: Remains free of injury related to seizures activity  Outcome: Adequate for Discharge  Goal: Achieves maximal functionality and self care  Outcome: Adequate for Discharge     Problem: Skin/Tissue Integrity - Adult  Goal: Incisions, wounds, or drain sites healing without S/S of infection  Outcome: Adequate for Discharge  Goal: Oral mucous membranes remain intact  Outcome: Adequate for Discharge     Problem: Musculoskeletal - Adult  Goal: Return mobility to safest level of function  Outcome: Adequate for Discharge  Goal: Maintain proper alignment of affected body part  Outcome: Adequate for Discharge  Goal: Return ADL status to a safe level of function  Outcome: Adequate for Discharge     Problem: Genitourinary - Adult  Goal: Absence of urinary retention  Outcome: Adequate for Discharge  Goal: Urinary catheter remains patent  Outcome:

## 2023-08-30 NOTE — DISCHARGE INSTR - COC
Description Serosanguinous 08/28/23 1600   Odor None 08/23/23 2000   Number of days: 8       Wound 08/22/23 Scrotum open areas scrotum, inner thighs, groin (Active)   Dressing Status New dressing applied;Clean;Dry; Intact 08/29/23 1940   Wound Cleansed Soap and water 08/29/23 1940   Dressing/Treatment Interdry Ag/wicking fabric with Ag 08/29/23 1940   Wound Assessment Pink/red 08/28/23 1600   Drainage Amount Scant (moist but unmeasurable) 08/28/23 2030   Drainage Description Serosanguinous 08/28/23 1600   Odor Moderate 08/27/23 1600   Number of days: 8       Wound 08/22/23 Knee Left;Posterior open area (Active)   Wound Cleansed Other (Comment) 08/24/23 0657   Dressing/Treatment Protective barrier 08/24/23 0657   Wound Assessment Pink/red 08/24/23 0657   Drainage Amount Scant (moist but unmeasurable) 08/24/23 0657   Drainage Description Serosanguinous 08/24/23 0657   Number of days: 8       Wound 08/22/23 Knee Posterior;Right open area (Active)   Wound Cleansed Other (Comment) 08/24/23 0515   Dressing/Treatment Protective barrier 08/24/23 0515   Wound Assessment Pink/red 08/24/23 0515   Drainage Amount Scant (moist but unmeasurable) 08/24/23 0515   Drainage Description Serosanguinous 08/24/23 0515   Number of days: 8       Wound 08/22/23 Brachial Distal;Left open area (Active)   Wound Cleansed Other (Comment) 08/24/23 0515   Wound Assessment Pink/red 08/24/23 0515   Drainage Amount Scant (moist but unmeasurable) 08/24/23 0515   Odor None 08/24/23 0515   Number of days: 8        Elimination:  Continence: Bowel: Yes  Bladder: Yes  Urinary Catheter: Insertion Date: 8/26/23    Colostomy/Ileostomy/Ileal Conduit: No       Date of Last BM: 8/29/23    Intake/Output Summary (Last 24 hours) at 8/30/2023 0812  Last data filed at 8/30/2023 0657  Gross per 24 hour   Intake 1460 ml   Output 900 ml   Net 560 ml     I/O last 3 completed shifts:   In: 1902 [P.O.:1802; IV Piggyback:100]  Out: 1800 [Urine:1800]    Safety Concerns:

## 2023-10-19 ENCOUNTER — APPOINTMENT (OUTPATIENT)
Dept: CT IMAGING | Age: 58
DRG: 004 | End: 2023-10-19
Payer: MEDICARE

## 2023-10-19 ENCOUNTER — APPOINTMENT (OUTPATIENT)
Dept: GENERAL RADIOLOGY | Age: 58
DRG: 004 | End: 2023-10-19
Payer: MEDICARE

## 2023-10-19 ENCOUNTER — HOSPITAL ENCOUNTER (INPATIENT)
Age: 58
LOS: 21 days | Discharge: NURSING, REHAB OR LONG-TERM CARE | DRG: 004 | End: 2023-11-09
Attending: EMERGENCY MEDICINE | Admitting: INTERNAL MEDICINE
Payer: MEDICARE

## 2023-10-19 DIAGNOSIS — F32.A ANXIETY AND DEPRESSION: ICD-10-CM

## 2023-10-19 DIAGNOSIS — R41.82 ALTERED MENTAL STATUS, UNSPECIFIED ALTERED MENTAL STATUS TYPE: Primary | ICD-10-CM

## 2023-10-19 DIAGNOSIS — E66.01 OBESITY, MORBID, BMI 50 OR HIGHER (HCC): Chronic | ICD-10-CM

## 2023-10-19 DIAGNOSIS — J96.02 ACUTE RESPIRATORY FAILURE WITH HYPOXIA AND HYPERCAPNIA (HCC): ICD-10-CM

## 2023-10-19 DIAGNOSIS — J96.01 ACUTE RESPIRATORY FAILURE WITH HYPOXIA AND HYPERCAPNIA (HCC): ICD-10-CM

## 2023-10-19 DIAGNOSIS — F41.9 ANXIETY AND DEPRESSION: ICD-10-CM

## 2023-10-19 DIAGNOSIS — A41.9 SEPSIS, DUE TO UNSPECIFIED ORGANISM, UNSPECIFIED WHETHER ACUTE ORGAN DYSFUNCTION PRESENT (HCC): ICD-10-CM

## 2023-10-19 DIAGNOSIS — N63.0 BREAST NODULE: ICD-10-CM

## 2023-10-19 PROBLEM — J96.22 ACUTE ON CHRONIC RESPIRATORY FAILURE WITH HYPOXIA AND HYPERCAPNIA (HCC): Status: ACTIVE | Noted: 2023-10-19

## 2023-10-19 PROBLEM — J96.21 ACUTE ON CHRONIC RESPIRATORY FAILURE WITH HYPOXIA AND HYPERCAPNIA (HCC): Status: ACTIVE | Noted: 2023-10-19

## 2023-10-19 LAB
ALBUMIN SERPL-MCNC: 2.6 G/DL (ref 3.5–5.2)
ALBUMIN/GLOB SERPL: 0.8 {RATIO} (ref 1–2.5)
ALP SERPL-CCNC: 153 U/L (ref 40–129)
ALT SERPL-CCNC: 13 U/L (ref 5–41)
ANION GAP SERPL CALCULATED.3IONS-SCNC: 8 MMOL/L (ref 9–17)
AST SERPL-CCNC: 18 U/L
B PARAP IS1001 DNA NPH QL NAA+NON-PROBE: NOT DETECTED
B PERT DNA SPEC QL NAA+PROBE: NOT DETECTED
BACTERIA URNS QL MICRO: NORMAL
BASOPHILS # BLD: 0 K/UL (ref 0–0.2)
BASOPHILS NFR BLD: 0 % (ref 0–2)
BILIRUB SERPL-MCNC: 0.5 MG/DL (ref 0.3–1.2)
BILIRUB UR QL STRIP: NEGATIVE
BNP SERPL-MCNC: 7996 PG/ML
BUN BLD-MCNC: 30 MG/DL (ref 6–20)
BUN SERPL-MCNC: 25 MG/DL (ref 6–20)
C PNEUM DNA NPH QL NAA+NON-PROBE: NOT DETECTED
CA-I BLD-SCNC: 1.21 MMOL/L (ref 1.15–1.33)
CALCIUM SERPL-MCNC: 8.9 MG/DL (ref 8.6–10.4)
CASTS #/AREA URNS LPF: NORMAL /LPF (ref 0–8)
CHLORIDE BLD-SCNC: 91 MMOL/L (ref 98–110)
CHLORIDE SERPL-SCNC: 91 MMOL/L (ref 98–107)
CLARITY UR: CLEAR
CO2 BLD CALC-SCNC: 48 MMOL/L (ref 20–31)
CO2 SERPL-SCNC: 36 MMOL/L (ref 20–31)
COLOR UR: YELLOW
CREAT SERPL-MCNC: 1.5 MG/DL (ref 0.7–1.2)
EGFR, POC: 49 ML/MIN/1.73M2
EOSINOPHIL # BLD: 0.18 K/UL (ref 0–0.4)
EOSINOPHILS RELATIVE PERCENT: 2 % (ref 1–4)
EPI CELLS #/AREA URNS HPF: NORMAL /HPF (ref 0–5)
ERYTHROCYTE [DISTWIDTH] IN BLOOD BY AUTOMATED COUNT: 16.4 % (ref 11.8–14.4)
FIO2: 100
FLUAV RNA NPH QL NAA+NON-PROBE: NOT DETECTED
FLUBV RNA NPH QL NAA+NON-PROBE: NOT DETECTED
GFR SERPL CREATININE-BSD FRML MDRD: 53 ML/MIN/1.73M2
GLUCOSE BLD-MCNC: 100 MG/DL (ref 74–100)
GLUCOSE BLD-MCNC: 83 MG/DL (ref 74–100)
GLUCOSE BLD-MCNC: 83 MG/DL (ref 75–110)
GLUCOSE SERPL-MCNC: 98 MG/DL (ref 70–99)
GLUCOSE UR STRIP-MCNC: NEGATIVE MG/DL
HADV DNA NPH QL NAA+NON-PROBE: NOT DETECTED
HCO3 VENOUS: 46.2 MMOL/L (ref 22–29)
HCO3 VENOUS: 47.3 MMOL/L (ref 22–29)
HCOV 229E RNA NPH QL NAA+NON-PROBE: NOT DETECTED
HCOV HKU1 RNA NPH QL NAA+NON-PROBE: NOT DETECTED
HCOV NL63 RNA NPH QL NAA+NON-PROBE: NOT DETECTED
HCOV OC43 RNA NPH QL NAA+NON-PROBE: NOT DETECTED
HCT VFR BLD AUTO: 33.1 % (ref 40.7–50.3)
HCT VFR BLD AUTO: 34 % (ref 41–53)
HGB BLD-MCNC: 9 G/DL (ref 13–17)
HGB UR QL STRIP.AUTO: ABNORMAL
HMPV RNA NPH QL NAA+NON-PROBE: NOT DETECTED
HPIV1 RNA NPH QL NAA+NON-PROBE: NOT DETECTED
HPIV2 RNA NPH QL NAA+NON-PROBE: NOT DETECTED
HPIV3 RNA NPH QL NAA+NON-PROBE: NOT DETECTED
HPIV4 RNA NPH QL NAA+NON-PROBE: NOT DETECTED
IMM GRANULOCYTES # BLD AUTO: 0 K/UL (ref 0–0.3)
IMM GRANULOCYTES NFR BLD: 0 %
KETONES UR STRIP-MCNC: NEGATIVE MG/DL
LACTIC ACID, SEPSIS WHOLE BLOOD: 1.4 MMOL/L (ref 0.5–1.9)
LACTIC ACID, SEPSIS WHOLE BLOOD: 1.4 MMOL/L (ref 0.5–1.9)
LEUKOCYTE ESTERASE UR QL STRIP: ABNORMAL
LYMPHOCYTES NFR BLD: 0.44 K/UL (ref 1–4.8)
LYMPHOCYTES RELATIVE PERCENT: 5 % (ref 24–44)
M PNEUMO DNA NPH QL NAA+NON-PROBE: NOT DETECTED
MCH RBC QN AUTO: 25.9 PG (ref 25.2–33.5)
MCHC RBC AUTO-ENTMCNC: 27.2 G/DL (ref 28.4–34.8)
MCV RBC AUTO: 95.4 FL (ref 82.6–102.9)
MONOCYTES NFR BLD: 0.62 K/UL (ref 0.1–0.8)
MONOCYTES NFR BLD: 7 % (ref 1–7)
MORPHOLOGY: ABNORMAL
NEUTROPHILS NFR BLD: 86 % (ref 36–66)
NEUTS SEG NFR BLD: 7.56 K/UL (ref 1.8–7.7)
NITRITE UR QL STRIP: NEGATIVE
NRBC BLD-RTO: 0 PER 100 WBC
O2 DELIVERY DEVICE: ABNORMAL
O2 SAT, VEN: 27.8 % (ref 60–85)
O2 SAT, VEN: 47.5 % (ref 60–85)
PCO2, VEN: 107.2 MM HG (ref 41–51)
PCO2, VEN: 88.8 MM HG (ref 41–51)
PH UR STRIP: 7.5 [PH] (ref 5–8)
PH VENOUS: 7.24 (ref 7.32–7.43)
PH VENOUS: 7.33 (ref 7.32–7.43)
PLATELET # BLD AUTO: 136 K/UL (ref 138–453)
PMV BLD AUTO: 10.5 FL (ref 8.1–13.5)
PO2, VEN: 21.2 MM HG (ref 30–50)
PO2, VEN: 32.8 MM HG (ref 30–50)
POC CREATININE: 1.6 MG/DL (ref 0.51–1.19)
POC HCO3: 38.9 MMOL/L (ref 21–28)
POC HEMOGLOBIN (CALC): 11.6 G/DL (ref 13.5–17.5)
POC LACTIC ACID: 0.8 MMOL/L (ref 0.56–1.39)
POC O2 SATURATION: 100 % (ref 94–98)
POC PCO2: 39.3 MM HG (ref 35–48)
POC PH: 7.6 (ref 7.35–7.45)
POC PO2: 395 MM HG (ref 83–108)
POSITIVE BASE EXCESS, ART: 15.9 MMOL/L (ref 0–3)
POSITIVE BASE EXCESS, VEN: 15 MMOL/L (ref 0–3)
POSITIVE BASE EXCESS, VEN: 17.5 MMOL/L (ref 0–3)
POTASSIUM BLD-SCNC: 4.9 MMOL/L (ref 3.5–5.1)
POTASSIUM SERPL-SCNC: 4.8 MMOL/L (ref 3.7–5.3)
PROCALCITONIN SERPL-MCNC: 1.63 NG/ML
PROT SERPL-MCNC: 5.8 G/DL (ref 6.4–8.3)
PROT UR STRIP-MCNC: ABNORMAL MG/DL
RBC # BLD AUTO: 3.47 M/UL (ref 4.21–5.77)
RBC #/AREA URNS HPF: NORMAL /HPF (ref 0–4)
RSV RNA NPH QL NAA+NON-PROBE: NOT DETECTED
RV+EV RNA NPH QL NAA+NON-PROBE: NOT DETECTED
SARS-COV-2 RNA NPH QL NAA+NON-PROBE: NOT DETECTED
SODIUM BLD-SCNC: 133 MMOL/L (ref 136–145)
SODIUM SERPL-SCNC: 135 MMOL/L (ref 135–144)
SP GR UR STRIP: 1.01 (ref 1–1.03)
SPECIMEN DESCRIPTION: NORMAL
T4 FREE SERPL-MCNC: 1.4 NG/DL (ref 0.9–1.7)
TROPONIN I SERPL HS-MCNC: 29 NG/L (ref 0–22)
TROPONIN I SERPL HS-MCNC: 31 NG/L (ref 0–22)
TSH SERPL DL<=0.05 MIU/L-ACNC: 10.79 UIU/ML (ref 0.3–5)
UROBILINOGEN UR STRIP-ACNC: NORMAL EU/DL (ref 0–1)
WBC #/AREA URNS HPF: NORMAL /HPF (ref 0–5)
WBC OTHER # BLD: 8.8 K/UL (ref 3.5–11.3)

## 2023-10-19 PROCEDURE — 71045 X-RAY EXAM CHEST 1 VIEW: CPT

## 2023-10-19 PROCEDURE — 2580000003 HC RX 258: Performed by: PEDIATRICS

## 2023-10-19 PROCEDURE — 6360000002 HC RX W HCPCS

## 2023-10-19 PROCEDURE — 99285 EMERGENCY DEPT VISIT HI MDM: CPT

## 2023-10-19 PROCEDURE — 96375 TX/PRO/DX INJ NEW DRUG ADDON: CPT

## 2023-10-19 PROCEDURE — 70450 CT HEAD/BRAIN W/O DYE: CPT

## 2023-10-19 PROCEDURE — 0BH18EZ INSERTION OF ENDOTRACHEAL AIRWAY INTO TRACHEA, VIA NATURAL OR ARTIFICIAL OPENING ENDOSCOPIC: ICD-10-PCS | Performed by: EMERGENCY MEDICINE

## 2023-10-19 PROCEDURE — 2500000003 HC RX 250 WO HCPCS

## 2023-10-19 PROCEDURE — 6360000002 HC RX W HCPCS: Performed by: EMERGENCY MEDICINE

## 2023-10-19 PROCEDURE — 36600 WITHDRAWAL OF ARTERIAL BLOOD: CPT

## 2023-10-19 PROCEDURE — 87641 MR-STAPH DNA AMP PROBE: CPT

## 2023-10-19 PROCEDURE — 5A1955Z RESPIRATORY VENTILATION, GREATER THAN 96 CONSECUTIVE HOURS: ICD-10-PCS | Performed by: EMERGENCY MEDICINE

## 2023-10-19 PROCEDURE — 85014 HEMATOCRIT: CPT

## 2023-10-19 PROCEDURE — 82330 ASSAY OF CALCIUM: CPT

## 2023-10-19 PROCEDURE — 94761 N-INVAS EAR/PLS OXIMETRY MLT: CPT

## 2023-10-19 PROCEDURE — 81001 URINALYSIS AUTO W/SCOPE: CPT

## 2023-10-19 PROCEDURE — 87077 CULTURE AEROBIC IDENTIFY: CPT

## 2023-10-19 PROCEDURE — 84443 ASSAY THYROID STIM HORMONE: CPT

## 2023-10-19 PROCEDURE — 82565 ASSAY OF CREATININE: CPT

## 2023-10-19 PROCEDURE — 96365 THER/PROPH/DIAG IV INF INIT: CPT

## 2023-10-19 PROCEDURE — 6360000004 HC RX CONTRAST MEDICATION: Performed by: EMERGENCY MEDICINE

## 2023-10-19 PROCEDURE — 96366 THER/PROPH/DIAG IV INF ADDON: CPT

## 2023-10-19 PROCEDURE — 2000000000 HC ICU R&B

## 2023-10-19 PROCEDURE — 87070 CULTURE OTHR SPECIMN AEROBIC: CPT

## 2023-10-19 PROCEDURE — 74177 CT ABD & PELVIS W/CONTRAST: CPT

## 2023-10-19 PROCEDURE — 02HV33Z INSERTION OF INFUSION DEVICE INTO SUPERIOR VENA CAVA, PERCUTANEOUS APPROACH: ICD-10-PCS | Performed by: EMERGENCY MEDICINE

## 2023-10-19 PROCEDURE — 87205 SMEAR GRAM STAIN: CPT

## 2023-10-19 PROCEDURE — 99291 CRITICAL CARE FIRST HOUR: CPT | Performed by: INTERNAL MEDICINE

## 2023-10-19 PROCEDURE — 2700000000 HC OXYGEN THERAPY PER DAY

## 2023-10-19 PROCEDURE — 71260 CT THORAX DX C+: CPT

## 2023-10-19 PROCEDURE — 82947 ASSAY GLUCOSE BLOOD QUANT: CPT

## 2023-10-19 PROCEDURE — 2580000003 HC RX 258: Performed by: EMERGENCY MEDICINE

## 2023-10-19 PROCEDURE — 74018 RADEX ABDOMEN 1 VIEW: CPT

## 2023-10-19 PROCEDURE — 2500000003 HC RX 250 WO HCPCS: Performed by: EMERGENCY MEDICINE

## 2023-10-19 PROCEDURE — 82803 BLOOD GASES ANY COMBINATION: CPT

## 2023-10-19 PROCEDURE — 87086 URINE CULTURE/COLONY COUNT: CPT

## 2023-10-19 PROCEDURE — 84520 ASSAY OF UREA NITROGEN: CPT

## 2023-10-19 PROCEDURE — 36415 COLL VENOUS BLD VENIPUNCTURE: CPT

## 2023-10-19 PROCEDURE — 80053 COMPREHEN METABOLIC PANEL: CPT

## 2023-10-19 PROCEDURE — 94660 CPAP INITIATION&MGMT: CPT

## 2023-10-19 PROCEDURE — 83880 ASSAY OF NATRIURETIC PEPTIDE: CPT

## 2023-10-19 PROCEDURE — 84145 PROCALCITONIN (PCT): CPT

## 2023-10-19 PROCEDURE — 0202U NFCT DS 22 TRGT SARS-COV-2: CPT

## 2023-10-19 PROCEDURE — 94002 VENT MGMT INPAT INIT DAY: CPT

## 2023-10-19 PROCEDURE — 84439 ASSAY OF FREE THYROXINE: CPT

## 2023-10-19 PROCEDURE — 85025 COMPLETE CBC W/AUTO DIFF WBC: CPT

## 2023-10-19 PROCEDURE — 84484 ASSAY OF TROPONIN QUANT: CPT

## 2023-10-19 PROCEDURE — 87186 SC STD MICRODIL/AGAR DIL: CPT

## 2023-10-19 PROCEDURE — 93005 ELECTROCARDIOGRAM TRACING: CPT | Performed by: EMERGENCY MEDICINE

## 2023-10-19 PROCEDURE — 80051 ELECTROLYTE PANEL: CPT

## 2023-10-19 PROCEDURE — 83605 ASSAY OF LACTIC ACID: CPT

## 2023-10-19 PROCEDURE — 87040 BLOOD CULTURE FOR BACTERIA: CPT

## 2023-10-19 RX ORDER — MAGNESIUM SULFATE IN WATER 40 MG/ML
2000 INJECTION, SOLUTION INTRAVENOUS PRN
Status: DISCONTINUED | OUTPATIENT
Start: 2023-10-19 | End: 2023-11-09 | Stop reason: HOSPADM

## 2023-10-19 RX ORDER — SODIUM CHLORIDE 0.9 % (FLUSH) 0.9 %
5-40 SYRINGE (ML) INJECTION PRN
Status: DISCONTINUED | OUTPATIENT
Start: 2023-10-19 | End: 2023-11-03

## 2023-10-19 RX ORDER — 0.9 % SODIUM CHLORIDE 0.9 %
30 INTRAVENOUS SOLUTION INTRAVENOUS ONCE
Status: COMPLETED | OUTPATIENT
Start: 2023-10-19 | End: 2023-10-19

## 2023-10-19 RX ORDER — FENTANYL CITRATE 50 UG/ML
100 INJECTION, SOLUTION INTRAMUSCULAR; INTRAVENOUS ONCE
Status: COMPLETED | OUTPATIENT
Start: 2023-10-19 | End: 2023-10-19

## 2023-10-19 RX ORDER — PROPOFOL 10 MG/ML
5-50 INJECTION, EMULSION INTRAVENOUS CONTINUOUS
Status: DISCONTINUED | OUTPATIENT
Start: 2023-10-19 | End: 2023-10-20

## 2023-10-19 RX ORDER — ONDANSETRON 2 MG/ML
4 INJECTION INTRAMUSCULAR; INTRAVENOUS EVERY 6 HOURS PRN
Status: DISCONTINUED | OUTPATIENT
Start: 2023-10-19 | End: 2023-10-21 | Stop reason: SDUPTHER

## 2023-10-19 RX ORDER — ACETAMINOPHEN 325 MG/1
650 TABLET ORAL EVERY 6 HOURS PRN
Status: DISCONTINUED | OUTPATIENT
Start: 2023-10-19 | End: 2023-10-21 | Stop reason: SDUPTHER

## 2023-10-19 RX ORDER — SODIUM CHLORIDE 0.9 % (FLUSH) 0.9 %
5-40 SYRINGE (ML) INJECTION EVERY 12 HOURS SCHEDULED
Status: DISCONTINUED | OUTPATIENT
Start: 2023-10-19 | End: 2023-11-04

## 2023-10-19 RX ORDER — NOREPINEPHRINE BITARTRATE 0.06 MG/ML
1-100 INJECTION, SOLUTION INTRAVENOUS CONTINUOUS
Status: DISCONTINUED | OUTPATIENT
Start: 2023-10-19 | End: 2023-10-22

## 2023-10-19 RX ORDER — SODIUM CHLORIDE 9 MG/ML
INJECTION, SOLUTION INTRAVENOUS PRN
Status: DISCONTINUED | OUTPATIENT
Start: 2023-10-19 | End: 2023-11-09 | Stop reason: HOSPADM

## 2023-10-19 RX ORDER — ACETAMINOPHEN 650 MG/1
650 SUPPOSITORY RECTAL EVERY 6 HOURS PRN
Status: DISCONTINUED | OUTPATIENT
Start: 2023-10-19 | End: 2023-10-21 | Stop reason: SDUPTHER

## 2023-10-19 RX ORDER — ONDANSETRON 2 MG/ML
4 INJECTION INTRAMUSCULAR; INTRAVENOUS ONCE
Status: COMPLETED | OUTPATIENT
Start: 2023-10-19 | End: 2023-10-19

## 2023-10-19 RX ORDER — POTASSIUM CHLORIDE 7.45 MG/ML
10 INJECTION INTRAVENOUS PRN
Status: DISCONTINUED | OUTPATIENT
Start: 2023-10-19 | End: 2023-10-21 | Stop reason: SDUPTHER

## 2023-10-19 RX ORDER — ONDANSETRON 4 MG/1
4 TABLET, ORALLY DISINTEGRATING ORAL EVERY 8 HOURS PRN
Status: DISCONTINUED | OUTPATIENT
Start: 2023-10-19 | End: 2023-10-21 | Stop reason: SDUPTHER

## 2023-10-19 RX ORDER — MIDAZOLAM HYDROCHLORIDE 1 MG/ML
INJECTION INTRAMUSCULAR; INTRAVENOUS
Status: COMPLETED
Start: 2023-10-19 | End: 2023-10-19

## 2023-10-19 RX ORDER — POTASSIUM CHLORIDE 29.8 MG/ML
20 INJECTION INTRAVENOUS PRN
Status: DISCONTINUED | OUTPATIENT
Start: 2023-10-19 | End: 2023-10-21 | Stop reason: SDUPTHER

## 2023-10-19 RX ORDER — ETOMIDATE 2 MG/ML
20 INJECTION INTRAVENOUS ONCE
Status: COMPLETED | OUTPATIENT
Start: 2023-10-19 | End: 2023-10-19

## 2023-10-19 RX ORDER — SUCCINYLCHOLINE CHLORIDE 20 MG/ML
300 INJECTION INTRAMUSCULAR; INTRAVENOUS ONCE
Status: COMPLETED | OUTPATIENT
Start: 2023-10-19 | End: 2023-10-19

## 2023-10-19 RX ORDER — NOREPINEPHRINE BITARTRATE 0.06 MG/ML
1-100 INJECTION, SOLUTION INTRAVENOUS CONTINUOUS
Status: DISCONTINUED | OUTPATIENT
Start: 2023-10-19 | End: 2023-10-19

## 2023-10-19 RX ORDER — POLYETHYLENE GLYCOL 3350 17 G/17G
17 POWDER, FOR SOLUTION ORAL DAILY PRN
Status: DISCONTINUED | OUTPATIENT
Start: 2023-10-19 | End: 2023-10-21 | Stop reason: SDUPTHER

## 2023-10-19 RX ADMIN — Medication 175 MCG/HR: at 19:58

## 2023-10-19 RX ADMIN — FENTANYL CITRATE 100 MCG: 50 INJECTION, SOLUTION INTRAMUSCULAR; INTRAVENOUS at 17:00

## 2023-10-19 RX ADMIN — ONDANSETRON 4 MG: 2 INJECTION INTRAMUSCULAR; INTRAVENOUS at 14:40

## 2023-10-19 RX ADMIN — SODIUM CHLORIDE, PRESERVATIVE FREE 10 ML: 5 INJECTION INTRAVENOUS at 23:23

## 2023-10-19 RX ADMIN — MIDAZOLAM 2 MG: 1 INJECTION INTRAMUSCULAR; INTRAVENOUS at 18:34

## 2023-10-19 RX ADMIN — PROPOFOL 30 MCG/KG/MIN: 10 INJECTION, EMULSION INTRAVENOUS at 23:05

## 2023-10-19 RX ADMIN — Medication 50 MCG/HR: at 17:36

## 2023-10-19 RX ADMIN — PIPERACILLIN AND TAZOBACTAM 3375 MG: 3; .375 INJECTION, POWDER, LYOPHILIZED, FOR SOLUTION INTRAVENOUS at 19:32

## 2023-10-19 RX ADMIN — PROPOFOL 20 MCG/KG/MIN: 10 INJECTION, EMULSION INTRAVENOUS at 16:59

## 2023-10-19 RX ADMIN — ETOMIDATE INJECTION 20 MG: 2 SOLUTION INTRAVENOUS at 16:55

## 2023-10-19 RX ADMIN — SUCCINYLCHOLINE CHLORIDE 300 MG: 20 INJECTION, SOLUTION INTRAMUSCULAR; INTRAVENOUS at 16:56

## 2023-10-19 RX ADMIN — Medication 5 MCG/MIN: at 14:53

## 2023-10-19 RX ADMIN — SODIUM CHLORIDE 2259 ML: 9 INJECTION, SOLUTION INTRAVENOUS at 14:40

## 2023-10-19 RX ADMIN — IOPAMIDOL 75 ML: 755 INJECTION, SOLUTION INTRAVENOUS at 17:55

## 2023-10-19 RX ADMIN — VANCOMYCIN HYDROCHLORIDE 2500 MG: 1 INJECTION, POWDER, LYOPHILIZED, FOR SOLUTION INTRAVENOUS at 20:12

## 2023-10-19 ASSESSMENT — PAIN DESCRIPTION - FREQUENCY: FREQUENCY: CONTINUOUS

## 2023-10-19 ASSESSMENT — PAIN DESCRIPTION - DESCRIPTORS: DESCRIPTORS: DISCOMFORT

## 2023-10-19 ASSESSMENT — PAIN DESCRIPTION - LOCATION: LOCATION: GENERALIZED

## 2023-10-19 ASSESSMENT — PAIN - FUNCTIONAL ASSESSMENT: PAIN_FUNCTIONAL_ASSESSMENT: 0-10

## 2023-10-19 ASSESSMENT — PAIN DESCRIPTION - PAIN TYPE: TYPE: ACUTE PAIN

## 2023-10-19 ASSESSMENT — PULMONARY FUNCTION TESTS
PIF_VALUE: 38
PIF_VALUE: 29

## 2023-10-19 ASSESSMENT — PAIN SCALES - GENERAL: PAINLEVEL_OUTOF10: 8

## 2023-10-19 NOTE — ED NOTES
100 mcg of fentanyl given IV by Fuentes Fleming RN per Dr. Tru Cruz.      Nicole Marcus RN  10/19/23 0608

## 2023-10-19 NOTE — ED NOTES
300 of succinylcholine given IV by Yi Bhatia RN per Dr. Gricel Elena.      Ursula Young RN  10/19/23 8662

## 2023-10-19 NOTE — H&P
Critical Care - History and Physical Examination    Patient's name:  Randell Segovia  Medical Record Number: [id number]  Patient's account/billing number: [de-identified]  Patient's YOB: 1965  Age: 60 y.o. Date of Admission: 10/19/2023  1:23 PM  Date of History and Physical Examination: 10/19/2023  Primary Care Physician: Kaleigh Miller MD  Attending Physician: Dr. Romero Monteiro Status: Prior    Chief complaint:   Chief Complaint   Patient presents with    Altered Mental Status     HISTORY OF PRESENT ILLNESS:      History was obtained from chart review. Patient is intubated and sedated upon my evaluation. Randell Segovia is a 60 y.o. with PMH of   morbid obesity with FABI and obesity hypoventilation syndrome,   chronic hypoxic and hypercapnic respiratory failure,   chronic diastolic CHF,   paroxysmal atrial fibrillation not on anticoagulation due to GI bleed history, chronically hypotensive on midodrine,   chronic indwelling catheter, chronic UTI (MDRO - Ecoli, Klebsiella, Enterococcus)  chronic lower extremity swelling with cellulitic changes/wounds,      Was brought to the ED from extended-care facility due to progressive altered mental status and confusion. LKW 7am.  In the ER he was initially following commands. He was put on BiPAP but progressively got more altered and confused and subsequently he got intubated and sedated. Initial VBG showed pH of 7.3, PCO2 of 88 but subsequent VBG showed PCO2 of 107 and pH of 7.2. He was also hypotensive on arrival with systolic blood pressure in 80s, he was started on Levophed for pressor support. Remarkable labs showed creatinine of 1.5 which is at baseline, normal lactic acid, hemoglobin of 9, normal WBC count. TSH of 10. CT chest was negative for pulmonary embolism but did show pneumonic changes in the right lower lobe with atelectatic changes. CT head was negative for acute abnormality.     CT abdomen pelvis was also ordered which is still

## 2023-10-19 NOTE — ED NOTES
Pt to ED via EMS for AMS. Per EMS pt was saturated in \"questionable fluid\" when moved over to their stretcher stating wounds on pts buttocks/back.       Noah Goldberg, RN  10/19/23 8218

## 2023-10-19 NOTE — ED NOTES
Fentanyl drip started at 50mcg/hr. 100mcg Fentanyl bolus given off bag.       Emma Peterson RN  10/19/23 6025

## 2023-10-19 NOTE — PROCEDURES
PROCEDURE NOTE - CENTRAL VENOUS LINE PLACEMENT    PATIENT NAME: Natyjosefa [facility] RECORD NO. [id number]  DATE: 10/19/2023  ATTENDING PHYSICIAN: Dr. Junior Weiss DIAGNOSIS:  vascular access, centrally administered medications, and need for frequent blood draws  POSTOPERATIVE DIAGNOSIS:  Same  PROCEDURE PERFORMED:  Right Internal Jugular Vein Central Line Insertion  PERFORMING PHYSICIAN: Vonnie Greenfield MD  ANESTHESIA:  Local utilizing 1% lidocaine  ESTIMATED BLOOD LOSS:  Less than 25 ml  COMPLICATIONS:  None immediately appreciated. DISCUSSION:  Michelle Rocha is a 60y.o.-year-old male who requires central IV access vascular access, centrally administered medications, and need for frequent blood draws. The history and physical examination were reviewed and confirmed. CONSENT: Unable to be obtained due to the emergent nature of this procedure. PROCEDURE:  A timeout was initiated by the bedside nurse and was confirmed by those present. The patient was placed in a supine position. The skin overlying the Right Internal Jugular Vein was prepped with chlorhexadine and draped in sterile fashion. The skin was infiltrated with local anesthetic. The vessel and surrounding anatomy was visualized using ultrasound. Through the anesthetized region, the introducer needle was inserted into the internal jugular vein returning dark red non pulsatile blood. A guidewire was placed through the center of the needle with no resistance. Ultrasound confirmed presence of wire in the vein. A small incision made in the skin with a #11 scalpel blade. The dilator was inserted into the skin and vein over guidewire using Seldinger technique. The dilator was then removed and the 7F 20cm catheter was placed in the vein over the guidewire using Seldinger technique, placed at 17 cm. The guidewire was then removed and all ports aspirated and flushed appropriately.  The catheter then secured using silk suture and a temporary

## 2023-10-19 NOTE — ED NOTES
20 of etomidate given IV by Jamin Benitez RN per Dr. Danial Alcantar.       Guillermo Serna RN  10/19/23 1373

## 2023-10-19 NOTE — ED NOTES
Pt intubated 7.5 fr tube 26cm at lips. Positive color change. Breath sounds bilaterally.       Mayela Adair RN  10/19/23 7898

## 2023-10-20 ENCOUNTER — APPOINTMENT (OUTPATIENT)
Dept: GENERAL RADIOLOGY | Age: 58
DRG: 004 | End: 2023-10-20
Payer: MEDICARE

## 2023-10-20 LAB
ALBUMIN SERPL-MCNC: 2.5 G/DL (ref 3.5–5.2)
ALBUMIN/GLOB SERPL: 0.8 {RATIO} (ref 1–2.5)
ALLEN TEST: POSITIVE
ALLEN TEST: POSITIVE
ALP SERPL-CCNC: 151 U/L (ref 40–129)
ALT SERPL-CCNC: 13 U/L (ref 5–41)
ANION GAP SERPL CALCULATED.3IONS-SCNC: 9 MMOL/L (ref 9–17)
AST SERPL-CCNC: 18 U/L
BASOPHILS # BLD: 0.15 K/UL (ref 0–0.2)
BASOPHILS NFR BLD: 1 % (ref 0–2)
BILIRUB SERPL-MCNC: 1.3 MG/DL (ref 0.3–1.2)
BUN SERPL-MCNC: 24 MG/DL (ref 6–20)
CALCIUM SERPL-MCNC: 9 MG/DL (ref 8.6–10.4)
CHLORIDE SERPL-SCNC: 95 MMOL/L (ref 98–107)
CO2 SERPL-SCNC: 35 MMOL/L (ref 20–31)
CREAT SERPL-MCNC: 1.4 MG/DL (ref 0.7–1.2)
CRITICAL ACTION: NORMAL
CRITICAL NOTIFICATION DATE/TIME: NORMAL
CRITICAL NOTIFICATION: NORMAL
CRITICAL VALUE READ BACK: YES
EKG ATRIAL RATE: 29 BPM
EKG Q-T INTERVAL: 254 MS
EKG QRS DURATION: 56 MS
EKG QTC CALCULATION (BAZETT): 392 MS
EKG R AXIS: 44 DEGREES
EKG T AXIS: 62 DEGREES
EKG VENTRICULAR RATE: 143 BPM
EOSINOPHIL # BLD: 0.31 K/UL (ref 0–0.44)
EOSINOPHILS RELATIVE PERCENT: 2 % (ref 1–4)
ERYTHROCYTE [DISTWIDTH] IN BLOOD BY AUTOMATED COUNT: 16.8 % (ref 11.8–14.4)
FIO2: 40
FIO2: 40
FIO2: 45
FIO2: 45
FIO2: 60
GFR SERPL CREATININE-BSD FRML MDRD: 58 ML/MIN/1.73M2
GLUCOSE BLD-MCNC: 79 MG/DL (ref 74–100)
GLUCOSE BLD-MCNC: 84 MG/DL (ref 74–100)
GLUCOSE BLD-MCNC: 93 MG/DL (ref 74–100)
GLUCOSE SERPL-MCNC: 82 MG/DL (ref 70–99)
HCT VFR BLD AUTO: 38.3 % (ref 40.7–50.3)
HGB BLD-MCNC: 10.6 G/DL (ref 13–17)
IMM GRANULOCYTES # BLD AUTO: 0.15 K/UL (ref 0–0.3)
IMM GRANULOCYTES NFR BLD: 1 %
LYMPHOCYTES NFR BLD: 1.22 K/UL (ref 1.1–3.7)
LYMPHOCYTES RELATIVE PERCENT: 8 % (ref 24–43)
MCH RBC QN AUTO: 25.8 PG (ref 25.2–33.5)
MCHC RBC AUTO-ENTMCNC: 27.7 G/DL (ref 28.4–34.8)
MCV RBC AUTO: 93.2 FL (ref 82.6–102.9)
MONOCYTES NFR BLD: 1.68 K/UL (ref 0.1–1.2)
MONOCYTES NFR BLD: 11 % (ref 3–12)
MORPHOLOGY: ABNORMAL
MRSA, DNA, NASAL: ABNORMAL
NEUTROPHILS NFR BLD: 77 % (ref 36–65)
NEUTS SEG NFR BLD: 11.79 K/UL (ref 1.5–8.1)
NRBC BLD-RTO: 0 PER 100 WBC
PATIENT TEMP: 37.3
PATIENT TEMP: 37.3
PATIENT TEMP: 37.4
PLATELET # BLD AUTO: 197 K/UL (ref 138–453)
PMV BLD AUTO: 10.5 FL (ref 8.1–13.5)
POC HCO3: 38.8 MMOL/L (ref 21–28)
POC HCO3: 39.5 MMOL/L (ref 21–28)
POC HCO3: 39.7 MMOL/L (ref 21–28)
POC HCO3: 41 MMOL/L (ref 21–28)
POC HCO3: 43.7 MMOL/L (ref 21–28)
POC O2 SATURATION: 93.1 % (ref 94–98)
POC O2 SATURATION: 95.6 % (ref 94–98)
POC O2 SATURATION: 97.7 % (ref 94–98)
POC O2 SATURATION: 98.3 % (ref 94–98)
POC O2 SATURATION: 99.1 % (ref 94–98)
POC PCO2 TEMP: 42.6 MM HG
POC PCO2 TEMP: 52.5 MM HG
POC PCO2 TEMP: 55.4 MM HG
POC PCO2: 42.1 MM HG (ref 35–48)
POC PCO2: 51.8 MM HG (ref 35–48)
POC PCO2: 54.4 MM HG (ref 35–48)
POC PCO2: 55.7 MM HG (ref 35–48)
POC PCO2: 61.5 MM HG (ref 35–48)
POC PH TEMP: 7.46
POC PH TEMP: 7.53
POC PH TEMP: 7.59
POC PH: 7.42 (ref 7.35–7.45)
POC PH: 7.46 (ref 7.35–7.45)
POC PH: 7.46 (ref 7.35–7.45)
POC PH: 7.53 (ref 7.35–7.45)
POC PH: 7.6 (ref 7.35–7.45)
POC PO2 TEMP: 109.5 MM HG
POC PO2 TEMP: 126.1 MM HG
POC PO2 TEMP: 85.6 MM HG
POC PO2: 107.1 MM HG (ref 83–108)
POC PO2: 124.2 MM HG (ref 83–108)
POC PO2: 65.9 MM HG (ref 83–108)
POC PO2: 81.2 MM HG (ref 83–108)
POC PO2: 83.9 MM HG (ref 83–108)
POSITIVE BASE EXCESS, ART: 12.8 MMOL/L (ref 0–3)
POSITIVE BASE EXCESS, ART: 13 MMOL/L (ref 0–3)
POSITIVE BASE EXCESS, ART: 13.5 MMOL/L (ref 0–3)
POSITIVE BASE EXCESS, ART: 17.5 MMOL/L (ref 0–3)
POSITIVE BASE EXCESS, ART: 18.4 MMOL/L (ref 0–3)
POTASSIUM SERPL-SCNC: 4.4 MMOL/L (ref 3.7–5.3)
PROT SERPL-MCNC: 5.8 G/DL (ref 6.4–8.3)
RBC # BLD AUTO: 4.11 M/UL (ref 4.21–5.77)
SAMPLE SITE: ABNORMAL
SODIUM SERPL-SCNC: 139 MMOL/L (ref 135–144)
SPECIMEN DESCRIPTION: ABNORMAL
WBC OTHER # BLD: 15.3 K/UL (ref 3.5–11.3)

## 2023-10-20 PROCEDURE — 6360000002 HC RX W HCPCS

## 2023-10-20 PROCEDURE — 2500000003 HC RX 250 WO HCPCS: Performed by: STUDENT IN AN ORGANIZED HEALTH CARE EDUCATION/TRAINING PROGRAM

## 2023-10-20 PROCEDURE — 6370000000 HC RX 637 (ALT 250 FOR IP): Performed by: INTERNAL MEDICINE

## 2023-10-20 PROCEDURE — 36415 COLL VENOUS BLD VENIPUNCTURE: CPT

## 2023-10-20 PROCEDURE — 6360000002 HC RX W HCPCS: Performed by: EMERGENCY MEDICINE

## 2023-10-20 PROCEDURE — 82803 BLOOD GASES ANY COMBINATION: CPT

## 2023-10-20 PROCEDURE — 2580000003 HC RX 258: Performed by: STUDENT IN AN ORGANIZED HEALTH CARE EDUCATION/TRAINING PROGRAM

## 2023-10-20 PROCEDURE — 6370000000 HC RX 637 (ALT 250 FOR IP): Performed by: STUDENT IN AN ORGANIZED HEALTH CARE EDUCATION/TRAINING PROGRAM

## 2023-10-20 PROCEDURE — 36600 WITHDRAWAL OF ARTERIAL BLOOD: CPT

## 2023-10-20 PROCEDURE — 80053 COMPREHEN METABOLIC PANEL: CPT

## 2023-10-20 PROCEDURE — 94640 AIRWAY INHALATION TREATMENT: CPT

## 2023-10-20 PROCEDURE — 93010 ELECTROCARDIOGRAM REPORT: CPT | Performed by: INTERNAL MEDICINE

## 2023-10-20 PROCEDURE — 99291 CRITICAL CARE FIRST HOUR: CPT | Performed by: INTERNAL MEDICINE

## 2023-10-20 PROCEDURE — 85025 COMPLETE CBC W/AUTO DIFF WBC: CPT

## 2023-10-20 PROCEDURE — 2580000003 HC RX 258

## 2023-10-20 PROCEDURE — 82947 ASSAY GLUCOSE BLOOD QUANT: CPT

## 2023-10-20 PROCEDURE — 2580000003 HC RX 258: Performed by: INTERNAL MEDICINE

## 2023-10-20 PROCEDURE — 99211 OFF/OP EST MAY X REQ PHY/QHP: CPT

## 2023-10-20 PROCEDURE — 2700000000 HC OXYGEN THERAPY PER DAY

## 2023-10-20 PROCEDURE — 71045 X-RAY EXAM CHEST 1 VIEW: CPT

## 2023-10-20 PROCEDURE — 2500000003 HC RX 250 WO HCPCS: Performed by: EMERGENCY MEDICINE

## 2023-10-20 PROCEDURE — 6360000002 HC RX W HCPCS: Performed by: PEDIATRICS

## 2023-10-20 PROCEDURE — 2580000003 HC RX 258: Performed by: PEDIATRICS

## 2023-10-20 PROCEDURE — 94761 N-INVAS EAR/PLS OXIMETRY MLT: CPT

## 2023-10-20 PROCEDURE — 2000000000 HC ICU R&B

## 2023-10-20 PROCEDURE — 94003 VENT MGMT INPAT SUBQ DAY: CPT

## 2023-10-20 PROCEDURE — 89220 SPUTUM SPECIMEN COLLECTION: CPT

## 2023-10-20 RX ORDER — ASPIRIN 81 MG/1
81 TABLET, CHEWABLE ORAL DAILY
Status: DISCONTINUED | OUTPATIENT
Start: 2023-10-21 | End: 2023-10-20

## 2023-10-20 RX ORDER — IPRATROPIUM BROMIDE AND ALBUTEROL SULFATE 2.5; .5 MG/3ML; MG/3ML
1 SOLUTION RESPIRATORY (INHALATION)
Status: DISCONTINUED | OUTPATIENT
Start: 2023-10-20 | End: 2023-10-20

## 2023-10-20 RX ORDER — IPRATROPIUM BROMIDE AND ALBUTEROL SULFATE 2.5; .5 MG/3ML; MG/3ML
1 SOLUTION RESPIRATORY (INHALATION) ONCE
Status: COMPLETED | OUTPATIENT
Start: 2023-10-20 | End: 2023-10-20

## 2023-10-20 RX ORDER — BUMETANIDE 1 MG/1
1 TABLET ORAL 2 TIMES DAILY
Status: DISCONTINUED | OUTPATIENT
Start: 2023-10-20 | End: 2023-10-23

## 2023-10-20 RX ORDER — 0.9 % SODIUM CHLORIDE 0.9 %
1000 INTRAVENOUS SOLUTION INTRAVENOUS ONCE
Status: COMPLETED | OUTPATIENT
Start: 2023-10-20 | End: 2023-10-21

## 2023-10-20 RX ORDER — MIDAZOLAM HYDROCHLORIDE 1 MG/ML
1-10 INJECTION, SOLUTION INTRAVENOUS CONTINUOUS
Status: DISCONTINUED | OUTPATIENT
Start: 2023-10-20 | End: 2023-10-27

## 2023-10-20 RX ORDER — ASPIRIN 81 MG/1
81 TABLET, CHEWABLE ORAL DAILY
Status: DISCONTINUED | OUTPATIENT
Start: 2023-10-20 | End: 2023-10-20

## 2023-10-20 RX ORDER — DIGOXIN 0.25 MG/ML
250 INJECTION INTRAMUSCULAR; INTRAVENOUS ONCE
Status: COMPLETED | OUTPATIENT
Start: 2023-10-20 | End: 2023-10-20

## 2023-10-20 RX ORDER — IPRATROPIUM BROMIDE AND ALBUTEROL SULFATE 2.5; .5 MG/3ML; MG/3ML
1 SOLUTION RESPIRATORY (INHALATION)
Status: DISCONTINUED | OUTPATIENT
Start: 2023-10-20 | End: 2023-10-25

## 2023-10-20 RX ORDER — SODIUM CHLORIDE 9 MG/ML
INJECTION, SOLUTION INTRAVENOUS CONTINUOUS
Status: DISCONTINUED | OUTPATIENT
Start: 2023-10-20 | End: 2023-10-22

## 2023-10-20 RX ORDER — ASPIRIN 81 MG/1
81 TABLET, CHEWABLE ORAL DAILY
Status: DISCONTINUED | OUTPATIENT
Start: 2023-10-20 | End: 2023-11-09 | Stop reason: HOSPADM

## 2023-10-20 RX ORDER — SODIUM CHLORIDE 9 MG/ML
INJECTION, SOLUTION INTRAVENOUS CONTINUOUS
Status: DISCONTINUED | OUTPATIENT
Start: 2023-10-20 | End: 2023-10-20

## 2023-10-20 RX ADMIN — SODIUM CHLORIDE, PRESERVATIVE FREE 20 MG: 5 INJECTION INTRAVENOUS at 09:26

## 2023-10-20 RX ADMIN — PROPOFOL 17.5 MCG/KG/MIN: 10 INJECTION, EMULSION INTRAVENOUS at 14:11

## 2023-10-20 RX ADMIN — IPRATROPIUM BROMIDE AND ALBUTEROL SULFATE 1 DOSE: .5; 2.5 SOLUTION RESPIRATORY (INHALATION) at 09:00

## 2023-10-20 RX ADMIN — PROPOFOL 15 MCG/KG/MIN: 10 INJECTION, EMULSION INTRAVENOUS at 01:26

## 2023-10-20 RX ADMIN — SODIUM CHLORIDE, PRESERVATIVE FREE 20 MG: 5 INJECTION INTRAVENOUS at 20:39

## 2023-10-20 RX ADMIN — Medication 24 MCG/MIN: at 09:05

## 2023-10-20 RX ADMIN — ASPIRIN 81 MG: 81 TABLET, CHEWABLE ORAL at 09:31

## 2023-10-20 RX ADMIN — VASOPRESSIN 0.03 UNITS/MIN: 20 INJECTION INTRAVENOUS at 21:38

## 2023-10-20 RX ADMIN — SODIUM CHLORIDE, PRESERVATIVE FREE 10 ML: 5 INJECTION INTRAVENOUS at 08:11

## 2023-10-20 RX ADMIN — MEROPENEM 1000 MG: 1 INJECTION, POWDER, FOR SOLUTION INTRAVENOUS at 01:46

## 2023-10-20 RX ADMIN — IPRATROPIUM BROMIDE AND ALBUTEROL SULFATE 1 DOSE: 2.5; .5 SOLUTION RESPIRATORY (INHALATION) at 12:18

## 2023-10-20 RX ADMIN — SODIUM CHLORIDE, PRESERVATIVE FREE 10 ML: 5 INJECTION INTRAVENOUS at 08:09

## 2023-10-20 RX ADMIN — SODIUM CHLORIDE, PRESERVATIVE FREE 10 ML: 5 INJECTION INTRAVENOUS at 20:39

## 2023-10-20 RX ADMIN — IPRATROPIUM BROMIDE AND ALBUTEROL SULFATE 1 DOSE: 2.5; .5 SOLUTION RESPIRATORY (INHALATION) at 19:32

## 2023-10-20 RX ADMIN — SODIUM CHLORIDE: 9 INJECTION, SOLUTION INTRAVENOUS at 23:05

## 2023-10-20 RX ADMIN — IPRATROPIUM BROMIDE AND ALBUTEROL SULFATE 1 DOSE: 2.5; .5 SOLUTION RESPIRATORY (INHALATION) at 15:24

## 2023-10-20 RX ADMIN — Medication 21 MCG/MIN: at 21:45

## 2023-10-20 RX ADMIN — SODIUM CHLORIDE, PRESERVATIVE FREE 10 ML: 5 INJECTION INTRAVENOUS at 08:08

## 2023-10-20 RX ADMIN — SODIUM CHLORIDE 1000 ML: 9 INJECTION, SOLUTION INTRAVENOUS at 23:05

## 2023-10-20 RX ADMIN — Medication 175 MCG/HR: at 11:37

## 2023-10-20 RX ADMIN — Medication 150 MCG/HR: at 01:41

## 2023-10-20 RX ADMIN — PROPOFOL 17.5 MCG/KG/MIN: 10 INJECTION, EMULSION INTRAVENOUS at 10:01

## 2023-10-20 RX ADMIN — VANCOMYCIN HYDROCHLORIDE 1000 MG: 1 INJECTION, POWDER, LYOPHILIZED, FOR SOLUTION INTRAVENOUS at 23:01

## 2023-10-20 RX ADMIN — VANCOMYCIN HYDROCHLORIDE 1000 MG: 1 INJECTION, POWDER, LYOPHILIZED, FOR SOLUTION INTRAVENOUS at 11:34

## 2023-10-20 RX ADMIN — MEROPENEM 1000 MG: 1 INJECTION, POWDER, FOR SOLUTION INTRAVENOUS at 16:16

## 2023-10-20 RX ADMIN — DIGOXIN 250 MCG: 0.25 INJECTION INTRAMUSCULAR; INTRAVENOUS at 20:14

## 2023-10-20 RX ADMIN — SODIUM CHLORIDE: 9 INJECTION, SOLUTION INTRAVENOUS at 01:46

## 2023-10-20 RX ADMIN — MEROPENEM 1000 MG: 1 INJECTION, POWDER, FOR SOLUTION INTRAVENOUS at 07:59

## 2023-10-20 RX ADMIN — Medication 2 MG/HR: at 15:54

## 2023-10-20 RX ADMIN — PROPOFOL 25 MCG/KG/MIN: 10 INJECTION, EMULSION INTRAVENOUS at 05:09

## 2023-10-20 ASSESSMENT — PULMONARY FUNCTION TESTS
PIF_VALUE: 26
PIF_VALUE: 22
PIF_VALUE: 29
PIF_VALUE: 25
PIF_VALUE: 29
PIF_VALUE: 30
PIF_VALUE: 24
PIF_VALUE: 30
PIF_VALUE: 25
PIF_VALUE: 22

## 2023-10-20 ASSESSMENT — PAIN SCALES - GENERAL: PAINLEVEL_OUTOF10: 0

## 2023-10-20 NOTE — ED NOTES
Report given to KAYLIN SOMERS [facility] ICU RN. All questions answered.       Celine Calvillo RN  10/19/23 2033

## 2023-10-20 NOTE — ED NOTES
The following labs were labeled with appropriate pt sticker and tubed to lab:     [] Blue     [] Lavender   [] on ice  [] Green/yellow  [] Green/black [] on ice  [] Francena Achilles  [] on ice  [] Yellow  [] Red  [] Pink  [] Type/ Screen  [] ABG  [] VBG    [x] COVID-19 swab    [] Rapid  [] PCR  [x] Flu swab  [] Peds Viral Panel     [] Urine Sample  [] Fecal Sample  [] Pelvic Cultures  [] Blood Cultures  [] X 2  [] STREP Cultures      Anthony Dubon RN  10/19/23 2037

## 2023-10-21 LAB
ALBUMIN SERPL-MCNC: 2.2 G/DL (ref 3.5–5.2)
ALBUMIN/GLOB SERPL: 0.8 {RATIO} (ref 1–2.5)
ALLEN TEST: POSITIVE
ALP SERPL-CCNC: 125 U/L (ref 40–129)
ALT SERPL-CCNC: 13 U/L (ref 5–41)
ANION GAP SERPL CALCULATED.3IONS-SCNC: 8 MMOL/L (ref 9–17)
AST SERPL-CCNC: 16 U/L
BASOPHILS # BLD: 0.13 K/UL (ref 0–0.2)
BASOPHILS NFR BLD: 1 % (ref 0–2)
BILIRUB SERPL-MCNC: 1.1 MG/DL (ref 0.3–1.2)
BUN SERPL-MCNC: 20 MG/DL (ref 6–20)
CALCIUM SERPL-MCNC: 8.4 MG/DL (ref 8.6–10.4)
CHLORIDE SERPL-SCNC: 98 MMOL/L (ref 98–107)
CO2 SERPL-SCNC: 34 MMOL/L (ref 20–31)
CREAT SERPL-MCNC: 1.3 MG/DL (ref 0.7–1.2)
EOSINOPHIL # BLD: 0.26 K/UL (ref 0–0.44)
EOSINOPHILS RELATIVE PERCENT: 2 % (ref 1–4)
ERYTHROCYTE [DISTWIDTH] IN BLOOD BY AUTOMATED COUNT: 17.5 % (ref 11.8–14.4)
FIO2: 40
GFR SERPL CREATININE-BSD FRML MDRD: >60 ML/MIN/1.73M2
GLUCOSE BLD-MCNC: 145 MG/DL (ref 74–100)
GLUCOSE BLD-MCNC: 153 MG/DL (ref 75–110)
GLUCOSE BLD-MCNC: 158 MG/DL (ref 75–110)
GLUCOSE SERPL-MCNC: 149 MG/DL (ref 70–99)
HCT VFR BLD AUTO: 32.4 % (ref 40.7–50.3)
HGB BLD-MCNC: 9.1 G/DL (ref 13–17)
IMM GRANULOCYTES # BLD AUTO: 0.13 K/UL (ref 0–0.3)
IMM GRANULOCYTES NFR BLD: 1 %
LYMPHOCYTES NFR BLD: 0.65 K/UL (ref 1.1–3.7)
LYMPHOCYTES RELATIVE PERCENT: 5 % (ref 24–43)
MCH RBC QN AUTO: 26 PG (ref 25.2–33.5)
MCHC RBC AUTO-ENTMCNC: 28.1 G/DL (ref 28.4–34.8)
MCV RBC AUTO: 92.6 FL (ref 82.6–102.9)
MICROORGANISM SPEC CULT: ABNORMAL
MICROORGANISM SPEC CULT: ABNORMAL
MICROORGANISM SPEC CULT: NORMAL
MICROORGANISM/AGENT SPEC: NORMAL
MODE: ABNORMAL
MONOCYTES NFR BLD: 1.03 K/UL (ref 0.1–1.2)
MONOCYTES NFR BLD: 8 % (ref 3–12)
MORPHOLOGY: ABNORMAL
NEUTROPHILS NFR BLD: 83 % (ref 36–65)
NEUTS SEG NFR BLD: 10.7 K/UL (ref 1.5–8.1)
NRBC BLD-RTO: 0 PER 100 WBC
O2 DELIVERY DEVICE: ABNORMAL
PLATELET # BLD AUTO: 147 K/UL (ref 138–453)
PMV BLD AUTO: 9.5 FL (ref 8.1–13.5)
POC HCO3: 37 MMOL/L (ref 21–28)
POC O2 SATURATION: 98.3 % (ref 94–98)
POC PCO2: 60.4 MM HG (ref 35–48)
POC PH: 7.39 (ref 7.35–7.45)
POC PO2: 114.5 MM HG (ref 83–108)
POSITIVE BASE EXCESS, ART: 10.4 MMOL/L (ref 0–3)
POTASSIUM SERPL-SCNC: 4.4 MMOL/L (ref 3.7–5.3)
PROT SERPL-MCNC: 5 G/DL (ref 6.4–8.3)
RBC # BLD AUTO: 3.5 M/UL (ref 4.21–5.77)
SAMPLE SITE: ABNORMAL
SODIUM SERPL-SCNC: 140 MMOL/L (ref 135–144)
SPECIMEN DESCRIPTION: ABNORMAL
SPECIMEN DESCRIPTION: NORMAL
WBC OTHER # BLD: 12.9 K/UL (ref 3.5–11.3)

## 2023-10-21 PROCEDURE — 36415 COLL VENOUS BLD VENIPUNCTURE: CPT

## 2023-10-21 PROCEDURE — 2580000003 HC RX 258: Performed by: PEDIATRICS

## 2023-10-21 PROCEDURE — 82803 BLOOD GASES ANY COMBINATION: CPT

## 2023-10-21 PROCEDURE — 2580000003 HC RX 258: Performed by: STUDENT IN AN ORGANIZED HEALTH CARE EDUCATION/TRAINING PROGRAM

## 2023-10-21 PROCEDURE — 80053 COMPREHEN METABOLIC PANEL: CPT

## 2023-10-21 PROCEDURE — 6360000002 HC RX W HCPCS: Performed by: STUDENT IN AN ORGANIZED HEALTH CARE EDUCATION/TRAINING PROGRAM

## 2023-10-21 PROCEDURE — 6360000002 HC RX W HCPCS

## 2023-10-21 PROCEDURE — 6370000000 HC RX 637 (ALT 250 FOR IP): Performed by: STUDENT IN AN ORGANIZED HEALTH CARE EDUCATION/TRAINING PROGRAM

## 2023-10-21 PROCEDURE — 2000000000 HC ICU R&B

## 2023-10-21 PROCEDURE — 94640 AIRWAY INHALATION TREATMENT: CPT

## 2023-10-21 PROCEDURE — 6360000002 HC RX W HCPCS: Performed by: PEDIATRICS

## 2023-10-21 PROCEDURE — 82947 ASSAY GLUCOSE BLOOD QUANT: CPT

## 2023-10-21 PROCEDURE — 94003 VENT MGMT INPAT SUBQ DAY: CPT

## 2023-10-21 PROCEDURE — 94761 N-INVAS EAR/PLS OXIMETRY MLT: CPT

## 2023-10-21 PROCEDURE — 2500000003 HC RX 250 WO HCPCS: Performed by: EMERGENCY MEDICINE

## 2023-10-21 PROCEDURE — 2700000000 HC OXYGEN THERAPY PER DAY

## 2023-10-21 PROCEDURE — 2500000003 HC RX 250 WO HCPCS: Performed by: STUDENT IN AN ORGANIZED HEALTH CARE EDUCATION/TRAINING PROGRAM

## 2023-10-21 PROCEDURE — 2580000003 HC RX 258: Performed by: INTERNAL MEDICINE

## 2023-10-21 PROCEDURE — 85025 COMPLETE CBC W/AUTO DIFF WBC: CPT

## 2023-10-21 PROCEDURE — 36600 WITHDRAWAL OF ARTERIAL BLOOD: CPT

## 2023-10-21 PROCEDURE — 2580000003 HC RX 258

## 2023-10-21 PROCEDURE — 6370000000 HC RX 637 (ALT 250 FOR IP): Performed by: INTERNAL MEDICINE

## 2023-10-21 PROCEDURE — 99291 CRITICAL CARE FIRST HOUR: CPT | Performed by: INTERNAL MEDICINE

## 2023-10-21 RX ORDER — DEXTROSE MONOHYDRATE 100 MG/ML
INJECTION, SOLUTION INTRAVENOUS CONTINUOUS PRN
Status: DISCONTINUED | OUTPATIENT
Start: 2023-10-21 | End: 2023-11-02

## 2023-10-21 RX ORDER — ACETAMINOPHEN 325 MG/1
650 TABLET ORAL EVERY 6 HOURS PRN
Status: DISCONTINUED | OUTPATIENT
Start: 2023-10-21 | End: 2023-10-21

## 2023-10-21 RX ORDER — POTASSIUM CHLORIDE 7.45 MG/ML
10 INJECTION INTRAVENOUS PRN
Status: DISCONTINUED | OUTPATIENT
Start: 2023-10-21 | End: 2023-11-09 | Stop reason: HOSPADM

## 2023-10-21 RX ORDER — INSULIN LISPRO 100 [IU]/ML
0-4 INJECTION, SOLUTION INTRAVENOUS; SUBCUTANEOUS NIGHTLY
Status: DISCONTINUED | OUTPATIENT
Start: 2023-10-21 | End: 2023-10-22

## 2023-10-21 RX ORDER — SODIUM CHLORIDE 0.9 % (FLUSH) 0.9 %
5-40 SYRINGE (ML) INJECTION EVERY 12 HOURS SCHEDULED
Status: DISCONTINUED | OUTPATIENT
Start: 2023-10-21 | End: 2023-11-09 | Stop reason: HOSPADM

## 2023-10-21 RX ORDER — POLYETHYLENE GLYCOL 3350 17 G/17G
17 POWDER, FOR SOLUTION ORAL DAILY PRN
Status: DISCONTINUED | OUTPATIENT
Start: 2023-10-21 | End: 2023-10-21

## 2023-10-21 RX ORDER — SODIUM CHLORIDE 9 MG/ML
INJECTION, SOLUTION INTRAVENOUS PRN
Status: DISCONTINUED | OUTPATIENT
Start: 2023-10-21 | End: 2023-10-21

## 2023-10-21 RX ORDER — ONDANSETRON 4 MG/1
4 TABLET, ORALLY DISINTEGRATING ORAL EVERY 8 HOURS PRN
Status: DISCONTINUED | OUTPATIENT
Start: 2023-10-21 | End: 2023-10-21

## 2023-10-21 RX ORDER — PHENYLEPHRINE HCL IN 0.9% NACL 50MG/250ML
10-300 PLASTIC BAG, INJECTION (ML) INTRAVENOUS CONTINUOUS
Status: DISCONTINUED | OUTPATIENT
Start: 2023-10-21 | End: 2023-10-23

## 2023-10-21 RX ORDER — ASPIRIN 81 MG/1
81 TABLET ORAL DAILY
Status: DISCONTINUED | OUTPATIENT
Start: 2023-10-21 | End: 2023-10-21

## 2023-10-21 RX ORDER — CLONAZEPAM 0.5 MG/1
0.5 TABLET ORAL 2 TIMES DAILY PRN
Status: ON HOLD | COMMUNITY
End: 2023-11-09 | Stop reason: HOSPADM

## 2023-10-21 RX ORDER — SODIUM CHLORIDE 0.9 % (FLUSH) 0.9 %
5-40 SYRINGE (ML) INJECTION PRN
Status: DISCONTINUED | OUTPATIENT
Start: 2023-10-21 | End: 2023-10-26

## 2023-10-21 RX ORDER — INSULIN LISPRO 100 [IU]/ML
0-8 INJECTION, SOLUTION INTRAVENOUS; SUBCUTANEOUS
Status: DISCONTINUED | OUTPATIENT
Start: 2023-10-21 | End: 2023-10-22

## 2023-10-21 RX ORDER — POTASSIUM CHLORIDE 20 MEQ/1
40 TABLET, EXTENDED RELEASE ORAL PRN
Status: DISCONTINUED | OUTPATIENT
Start: 2023-10-21 | End: 2023-11-09 | Stop reason: HOSPADM

## 2023-10-21 RX ORDER — ONDANSETRON 2 MG/ML
4 INJECTION INTRAMUSCULAR; INTRAVENOUS EVERY 6 HOURS PRN
Status: DISCONTINUED | OUTPATIENT
Start: 2023-10-21 | End: 2023-11-09 | Stop reason: HOSPADM

## 2023-10-21 RX ORDER — ENOXAPARIN SODIUM 100 MG/ML
60 INJECTION SUBCUTANEOUS 2 TIMES DAILY
Status: DISCONTINUED | OUTPATIENT
Start: 2023-10-21 | End: 2023-10-21

## 2023-10-21 RX ORDER — MIDODRINE HYDROCHLORIDE 5 MG/1
10 TABLET ORAL
Status: DISCONTINUED | OUTPATIENT
Start: 2023-10-21 | End: 2023-10-21

## 2023-10-21 RX ORDER — ACETAMINOPHEN 650 MG/1
650 SUPPOSITORY RECTAL EVERY 6 HOURS PRN
Status: DISCONTINUED | OUTPATIENT
Start: 2023-10-21 | End: 2023-10-21

## 2023-10-21 RX ADMIN — AMIODARONE HYDROCHLORIDE 0.5 MG/MIN: 50 INJECTION, SOLUTION INTRAVENOUS at 22:54

## 2023-10-21 RX ADMIN — IPRATROPIUM BROMIDE AND ALBUTEROL SULFATE 1 DOSE: 2.5; .5 SOLUTION RESPIRATORY (INHALATION) at 15:35

## 2023-10-21 RX ADMIN — Medication 30 MCG/MIN: at 16:14

## 2023-10-21 RX ADMIN — SODIUM CHLORIDE, PRESERVATIVE FREE 10 ML: 5 INJECTION INTRAVENOUS at 21:11

## 2023-10-21 RX ADMIN — VANCOMYCIN HYDROCHLORIDE 1000 MG: 1 INJECTION, POWDER, LYOPHILIZED, FOR SOLUTION INTRAVENOUS at 22:51

## 2023-10-21 RX ADMIN — IPRATROPIUM BROMIDE AND ALBUTEROL SULFATE 1 DOSE: 2.5; .5 SOLUTION RESPIRATORY (INHALATION) at 20:46

## 2023-10-21 RX ADMIN — SODIUM CHLORIDE: 9 INJECTION, SOLUTION INTRAVENOUS at 00:05

## 2023-10-21 RX ADMIN — VANCOMYCIN HYDROCHLORIDE 1000 MG: 1 INJECTION, POWDER, LYOPHILIZED, FOR SOLUTION INTRAVENOUS at 11:02

## 2023-10-21 RX ADMIN — MEROPENEM 1000 MG: 1 INJECTION, POWDER, FOR SOLUTION INTRAVENOUS at 07:43

## 2023-10-21 RX ADMIN — MEROPENEM 1000 MG: 1 INJECTION, POWDER, FOR SOLUTION INTRAVENOUS at 15:56

## 2023-10-21 RX ADMIN — SODIUM CHLORIDE, PRESERVATIVE FREE 10 ML: 5 INJECTION INTRAVENOUS at 08:32

## 2023-10-21 RX ADMIN — SODIUM CHLORIDE, PRESERVATIVE FREE 10 ML: 5 INJECTION INTRAVENOUS at 08:33

## 2023-10-21 RX ADMIN — ASPIRIN 81 MG: 81 TABLET, CHEWABLE ORAL at 08:44

## 2023-10-21 RX ADMIN — SODIUM CHLORIDE, PRESERVATIVE FREE 20 MG: 5 INJECTION INTRAVENOUS at 08:30

## 2023-10-21 RX ADMIN — SODIUM CHLORIDE: 9 INJECTION, SOLUTION INTRAVENOUS at 15:53

## 2023-10-21 RX ADMIN — IPRATROPIUM BROMIDE AND ALBUTEROL SULFATE 1 DOSE: 2.5; .5 SOLUTION RESPIRATORY (INHALATION) at 11:37

## 2023-10-21 RX ADMIN — SODIUM CHLORIDE, PRESERVATIVE FREE 20 MG: 5 INJECTION INTRAVENOUS at 21:10

## 2023-10-21 RX ADMIN — AMIODARONE HYDROCHLORIDE 1 MG/MIN: 150 INJECTION, SOLUTION INTRAVENOUS at 16:58

## 2023-10-21 RX ADMIN — VASOPRESSIN 0.03 UNITS/MIN: 20 INJECTION INTRAVENOUS at 17:00

## 2023-10-21 RX ADMIN — VASOPRESSIN 0.03 UNITS/MIN: 20 INJECTION INTRAVENOUS at 07:55

## 2023-10-21 RX ADMIN — Medication 175 MCG/HR: at 18:59

## 2023-10-21 RX ADMIN — SODIUM CHLORIDE, PRESERVATIVE FREE 10 ML: 5 INJECTION INTRAVENOUS at 08:31

## 2023-10-21 RX ADMIN — Medication 175 MCG/HR: at 05:11

## 2023-10-21 RX ADMIN — Medication 4 MG/HR: at 14:45

## 2023-10-21 RX ADMIN — IPRATROPIUM BROMIDE AND ALBUTEROL SULFATE 1 DOSE: 2.5; .5 SOLUTION RESPIRATORY (INHALATION) at 07:55

## 2023-10-21 RX ADMIN — MEROPENEM 1000 MG: 1 INJECTION, POWDER, FOR SOLUTION INTRAVENOUS at 00:06

## 2023-10-21 ASSESSMENT — PULMONARY FUNCTION TESTS
PIF_VALUE: 29
PIF_VALUE: 27
PIF_VALUE: 28
PIF_VALUE: 28
PIF_VALUE: 22
PIF_VALUE: 22
PIF_VALUE: 27
PIF_VALUE: 22
PIF_VALUE: 32

## 2023-10-21 ASSESSMENT — PAIN SCALES - GENERAL: PAINLEVEL_OUTOF10: 5

## 2023-10-22 ENCOUNTER — APPOINTMENT (OUTPATIENT)
Dept: GENERAL RADIOLOGY | Age: 58
DRG: 004 | End: 2023-10-22
Payer: MEDICARE

## 2023-10-22 LAB
ALBUMIN SERPL-MCNC: 2.4 G/DL (ref 3.5–5.2)
ALBUMIN/GLOB SERPL: 0.9 {RATIO} (ref 1–2.5)
ALLEN TEST: POSITIVE
ALP SERPL-CCNC: 122 U/L (ref 40–129)
ALT SERPL-CCNC: 14 U/L (ref 5–41)
ANION GAP SERPL CALCULATED.3IONS-SCNC: 9 MMOL/L (ref 9–17)
AST SERPL-CCNC: 15 U/L
BASOPHILS # BLD: 0.1 K/UL (ref 0–0.2)
BASOPHILS NFR BLD: 1 % (ref 0–2)
BILIRUB SERPL-MCNC: 1.1 MG/DL (ref 0.3–1.2)
BUN SERPL-MCNC: 16 MG/DL (ref 6–20)
CALCIUM SERPL-MCNC: 8.3 MG/DL (ref 8.6–10.4)
CHLORIDE SERPL-SCNC: 103 MMOL/L (ref 98–107)
CO2 SERPL-SCNC: 33 MMOL/L (ref 20–31)
CREAT SERPL-MCNC: 1.3 MG/DL (ref 0.7–1.2)
EOSINOPHIL # BLD: 0.1 K/UL (ref 0–0.4)
EOSINOPHILS RELATIVE PERCENT: 1 % (ref 1–4)
ERYTHROCYTE [DISTWIDTH] IN BLOOD BY AUTOMATED COUNT: 17.4 % (ref 11.8–14.4)
FIO2: 40
GFR SERPL CREATININE-BSD FRML MDRD: >60 ML/MIN/1.73M2
GLUCOSE BLD-MCNC: 126 MG/DL (ref 75–110)
GLUCOSE BLD-MCNC: 139 MG/DL (ref 75–110)
GLUCOSE BLD-MCNC: 145 MG/DL (ref 74–100)
GLUCOSE SERPL-MCNC: 150 MG/DL (ref 70–99)
HCT VFR BLD AUTO: 30.6 % (ref 40.7–50.3)
HGB BLD-MCNC: 8.4 G/DL (ref 13–17)
IMM GRANULOCYTES # BLD AUTO: 0.1 K/UL (ref 0–0.3)
IMM GRANULOCYTES NFR BLD: 1 %
LYMPHOCYTES NFR BLD: 0.52 K/UL (ref 1–4.8)
LYMPHOCYTES RELATIVE PERCENT: 5 % (ref 24–44)
MCH RBC QN AUTO: 25.6 PG (ref 25.2–33.5)
MCHC RBC AUTO-ENTMCNC: 27.5 G/DL (ref 28.4–34.8)
MCV RBC AUTO: 93.3 FL (ref 82.6–102.9)
MODE: ABNORMAL
MONOCYTES NFR BLD: 0.83 K/UL (ref 0.1–0.8)
MONOCYTES NFR BLD: 8 % (ref 1–7)
MORPHOLOGY: ABNORMAL
MORPHOLOGY: ABNORMAL
NEUTROPHILS NFR BLD: 84 % (ref 36–66)
NEUTS SEG NFR BLD: 8.75 K/UL (ref 1.8–7.7)
NRBC BLD-RTO: 0 PER 100 WBC
O2 DELIVERY DEVICE: ABNORMAL
PLATELET # BLD AUTO: 138 K/UL (ref 138–453)
PMV BLD AUTO: 9.6 FL (ref 8.1–13.5)
POC HCO3: 36.5 MMOL/L (ref 21–28)
POC O2 SATURATION: 97.8 % (ref 94–98)
POC PCO2: 62.3 MM HG (ref 35–48)
POC PH: 7.38 (ref 7.35–7.45)
POC PO2: 107.7 MM HG (ref 83–108)
POSITIVE BASE EXCESS, ART: 9.7 MMOL/L (ref 0–3)
POTASSIUM SERPL-SCNC: 4.1 MMOL/L (ref 3.7–5.3)
PROT SERPL-MCNC: 5.1 G/DL (ref 6.4–8.3)
RBC # BLD AUTO: 3.28 M/UL (ref 4.21–5.77)
SAMPLE SITE: ABNORMAL
SODIUM SERPL-SCNC: 145 MMOL/L (ref 135–144)
VANCOMYCIN TROUGH SERPL-MCNC: 28.3 UG/ML (ref 10–20)
WBC OTHER # BLD: 10.4 K/UL (ref 3.5–11.3)

## 2023-10-22 PROCEDURE — 6370000000 HC RX 637 (ALT 250 FOR IP)

## 2023-10-22 PROCEDURE — 2580000003 HC RX 258: Performed by: STUDENT IN AN ORGANIZED HEALTH CARE EDUCATION/TRAINING PROGRAM

## 2023-10-22 PROCEDURE — 2580000003 HC RX 258: Performed by: INTERNAL MEDICINE

## 2023-10-22 PROCEDURE — 6360000002 HC RX W HCPCS: Performed by: STUDENT IN AN ORGANIZED HEALTH CARE EDUCATION/TRAINING PROGRAM

## 2023-10-22 PROCEDURE — 82803 BLOOD GASES ANY COMBINATION: CPT

## 2023-10-22 PROCEDURE — 94003 VENT MGMT INPAT SUBQ DAY: CPT

## 2023-10-22 PROCEDURE — 2700000000 HC OXYGEN THERAPY PER DAY

## 2023-10-22 PROCEDURE — 2500000003 HC RX 250 WO HCPCS: Performed by: STUDENT IN AN ORGANIZED HEALTH CARE EDUCATION/TRAINING PROGRAM

## 2023-10-22 PROCEDURE — 85025 COMPLETE CBC W/AUTO DIFF WBC: CPT

## 2023-10-22 PROCEDURE — 94640 AIRWAY INHALATION TREATMENT: CPT

## 2023-10-22 PROCEDURE — 6360000002 HC RX W HCPCS

## 2023-10-22 PROCEDURE — 2580000003 HC RX 258: Performed by: PEDIATRICS

## 2023-10-22 PROCEDURE — 80202 ASSAY OF VANCOMYCIN: CPT

## 2023-10-22 PROCEDURE — 2000000000 HC ICU R&B

## 2023-10-22 PROCEDURE — 71045 X-RAY EXAM CHEST 1 VIEW: CPT

## 2023-10-22 PROCEDURE — 80053 COMPREHEN METABOLIC PANEL: CPT

## 2023-10-22 PROCEDURE — 94761 N-INVAS EAR/PLS OXIMETRY MLT: CPT

## 2023-10-22 PROCEDURE — 6360000002 HC RX W HCPCS: Performed by: PEDIATRICS

## 2023-10-22 PROCEDURE — 82947 ASSAY GLUCOSE BLOOD QUANT: CPT

## 2023-10-22 PROCEDURE — 2580000003 HC RX 258

## 2023-10-22 PROCEDURE — 6370000000 HC RX 637 (ALT 250 FOR IP): Performed by: STUDENT IN AN ORGANIZED HEALTH CARE EDUCATION/TRAINING PROGRAM

## 2023-10-22 PROCEDURE — 36600 WITHDRAWAL OF ARTERIAL BLOOD: CPT

## 2023-10-22 PROCEDURE — 36415 COLL VENOUS BLD VENIPUNCTURE: CPT

## 2023-10-22 PROCEDURE — 99291 CRITICAL CARE FIRST HOUR: CPT | Performed by: INTERNAL MEDICINE

## 2023-10-22 PROCEDURE — 6370000000 HC RX 637 (ALT 250 FOR IP): Performed by: INTERNAL MEDICINE

## 2023-10-22 RX ORDER — DOCUSATE SODIUM 100 MG/1
100 CAPSULE, LIQUID FILLED ORAL 2 TIMES DAILY
Status: DISCONTINUED | OUTPATIENT
Start: 2023-10-22 | End: 2023-10-22

## 2023-10-22 RX ORDER — FUROSEMIDE 10 MG/ML
20 INJECTION INTRAMUSCULAR; INTRAVENOUS ONCE
Status: COMPLETED | OUTPATIENT
Start: 2023-10-22 | End: 2023-10-22

## 2023-10-22 RX ORDER — MIDODRINE HYDROCHLORIDE 5 MG/1
10 TABLET ORAL EVERY 8 HOURS
Status: DISCONTINUED | OUTPATIENT
Start: 2023-10-22 | End: 2023-10-25

## 2023-10-22 RX ORDER — SODIUM CHLORIDE 9 MG/ML
INJECTION, SOLUTION INTRAVENOUS CONTINUOUS
Status: DISCONTINUED | OUTPATIENT
Start: 2023-10-22 | End: 2023-10-24

## 2023-10-22 RX ORDER — MIDODRINE HYDROCHLORIDE 5 MG/1
10 TABLET ORAL EVERY 8 HOURS
Status: DISCONTINUED | OUTPATIENT
Start: 2023-10-22 | End: 2023-10-22

## 2023-10-22 RX ADMIN — MEROPENEM 1000 MG: 1 INJECTION, POWDER, FOR SOLUTION INTRAVENOUS at 00:13

## 2023-10-22 RX ADMIN — MEROPENEM 1000 MG: 1 INJECTION, POWDER, FOR SOLUTION INTRAVENOUS at 23:35

## 2023-10-22 RX ADMIN — MEROPENEM 1000 MG: 1 INJECTION, POWDER, FOR SOLUTION INTRAVENOUS at 08:24

## 2023-10-22 RX ADMIN — SODIUM CHLORIDE: 9 INJECTION, SOLUTION INTRAVENOUS at 20:00

## 2023-10-22 RX ADMIN — DOCUSATE SODIUM LIQUID 100 MG: 100 LIQUID ORAL at 21:24

## 2023-10-22 RX ADMIN — VANCOMYCIN HYDROCHLORIDE 1000 MG: 1 INJECTION, POWDER, LYOPHILIZED, FOR SOLUTION INTRAVENOUS at 11:31

## 2023-10-22 RX ADMIN — Medication 100 MCG/MIN: at 16:03

## 2023-10-22 RX ADMIN — SODIUM CHLORIDE: 9 INJECTION, SOLUTION INTRAVENOUS at 03:48

## 2023-10-22 RX ADMIN — AMIODARONE HYDROCHLORIDE 0.5 MG/MIN: 50 INJECTION, SOLUTION INTRAVENOUS at 15:30

## 2023-10-22 RX ADMIN — Medication 4 MG/HR: at 14:56

## 2023-10-22 RX ADMIN — SODIUM CHLORIDE, PRESERVATIVE FREE 20 MG: 5 INJECTION INTRAVENOUS at 08:18

## 2023-10-22 RX ADMIN — SODIUM CHLORIDE, PRESERVATIVE FREE 20 MG: 5 INJECTION INTRAVENOUS at 21:24

## 2023-10-22 RX ADMIN — VASOPRESSIN 0.03 UNITS/MIN: 20 INJECTION INTRAVENOUS at 18:33

## 2023-10-22 RX ADMIN — SODIUM CHLORIDE, PRESERVATIVE FREE 10 ML: 5 INJECTION INTRAVENOUS at 21:25

## 2023-10-22 RX ADMIN — Medication 175 MCG/HR: at 09:51

## 2023-10-22 RX ADMIN — IPRATROPIUM BROMIDE AND ALBUTEROL SULFATE 1 DOSE: 2.5; .5 SOLUTION RESPIRATORY (INHALATION) at 15:34

## 2023-10-22 RX ADMIN — MIDODRINE HYDROCHLORIDE 10 MG: 5 TABLET ORAL at 21:24

## 2023-10-22 RX ADMIN — MEROPENEM 1000 MG: 1 INJECTION, POWDER, FOR SOLUTION INTRAVENOUS at 15:34

## 2023-10-22 RX ADMIN — VASOPRESSIN 0.03 UNITS/MIN: 20 INJECTION INTRAVENOUS at 03:46

## 2023-10-22 RX ADMIN — SODIUM CHLORIDE: 9 INJECTION, SOLUTION INTRAVENOUS at 18:35

## 2023-10-22 RX ADMIN — Medication 75 MCG/MIN: at 03:45

## 2023-10-22 RX ADMIN — ASPIRIN 81 MG: 81 TABLET, CHEWABLE ORAL at 08:31

## 2023-10-22 RX ADMIN — FUROSEMIDE 20 MG: 10 INJECTION, SOLUTION INTRAMUSCULAR; INTRAVENOUS at 14:15

## 2023-10-22 RX ADMIN — IPRATROPIUM BROMIDE AND ALBUTEROL SULFATE 1 DOSE: 2.5; .5 SOLUTION RESPIRATORY (INHALATION) at 11:25

## 2023-10-22 RX ADMIN — SODIUM CHLORIDE: 9 INJECTION, SOLUTION INTRAVENOUS at 00:11

## 2023-10-22 RX ADMIN — IPRATROPIUM BROMIDE AND ALBUTEROL SULFATE 1 DOSE: 2.5; .5 SOLUTION RESPIRATORY (INHALATION) at 19:48

## 2023-10-22 RX ADMIN — SODIUM CHLORIDE, PRESERVATIVE FREE 10 ML: 5 INJECTION INTRAVENOUS at 08:21

## 2023-10-22 RX ADMIN — IPRATROPIUM BROMIDE AND ALBUTEROL SULFATE 1 DOSE: 2.5; .5 SOLUTION RESPIRATORY (INHALATION) at 07:43

## 2023-10-22 RX ADMIN — SODIUM CHLORIDE, PRESERVATIVE FREE 10 ML: 5 INJECTION INTRAVENOUS at 08:20

## 2023-10-22 RX ADMIN — Medication 200 MCG/MIN: at 21:20

## 2023-10-22 RX ADMIN — MIDODRINE HYDROCHLORIDE 10 MG: 5 TABLET ORAL at 14:23

## 2023-10-22 ASSESSMENT — PULMONARY FUNCTION TESTS
PIF_VALUE: 34
PIF_VALUE: 25
PIF_VALUE: 29
PIF_VALUE: 25
PIF_VALUE: 35
PIF_VALUE: 24
PIF_VALUE: 28

## 2023-10-23 ENCOUNTER — APPOINTMENT (OUTPATIENT)
Dept: GENERAL RADIOLOGY | Age: 58
DRG: 004 | End: 2023-10-23
Payer: MEDICARE

## 2023-10-23 LAB
ALBUMIN SERPL-MCNC: 2.4 G/DL (ref 3.5–5.2)
ALBUMIN/GLOB SERPL: 0.8 {RATIO} (ref 1–2.5)
ALP SERPL-CCNC: 128 U/L (ref 40–129)
ALT SERPL-CCNC: 14 U/L (ref 5–41)
ANION GAP SERPL CALCULATED.3IONS-SCNC: 8 MMOL/L (ref 9–17)
AST SERPL-CCNC: 14 U/L
BASOPHILS # BLD: 0.1 K/UL (ref 0–0.2)
BASOPHILS NFR BLD: 1 % (ref 0–2)
BILIRUB SERPL-MCNC: 0.9 MG/DL (ref 0.3–1.2)
BUN SERPL-MCNC: 17 MG/DL (ref 6–20)
CALCIUM SERPL-MCNC: 8.5 MG/DL (ref 8.6–10.4)
CHLORIDE SERPL-SCNC: 107 MMOL/L (ref 98–107)
CO2 SERPL-SCNC: 31 MMOL/L (ref 20–31)
CREAT SERPL-MCNC: 1.2 MG/DL (ref 0.7–1.2)
EOSINOPHIL # BLD: 0.21 K/UL (ref 0–0.4)
EOSINOPHILS RELATIVE PERCENT: 2 % (ref 1–4)
ERYTHROCYTE [DISTWIDTH] IN BLOOD BY AUTOMATED COUNT: 17.6 % (ref 11.8–14.4)
GFR SERPL CREATININE-BSD FRML MDRD: >60 ML/MIN/1.73M2
GLUCOSE BLD-MCNC: 134 MG/DL (ref 75–110)
GLUCOSE BLD-MCNC: 141 MG/DL (ref 75–110)
GLUCOSE BLD-MCNC: 172 MG/DL (ref 75–110)
GLUCOSE SERPL-MCNC: 132 MG/DL (ref 70–99)
HCT VFR BLD AUTO: 31.6 % (ref 40.7–50.3)
HGB BLD-MCNC: 8.7 G/DL (ref 13–17)
IMM GRANULOCYTES # BLD AUTO: 0.1 K/UL (ref 0–0.3)
IMM GRANULOCYTES NFR BLD: 1 %
LYMPHOCYTES NFR BLD: 0.73 K/UL (ref 1–4.8)
LYMPHOCYTES RELATIVE PERCENT: 7 % (ref 24–44)
MCH RBC QN AUTO: 25.9 PG (ref 25.2–33.5)
MCHC RBC AUTO-ENTMCNC: 27.5 G/DL (ref 28.4–34.8)
MCV RBC AUTO: 94 FL (ref 82.6–102.9)
MONOCYTES NFR BLD: 0.94 K/UL (ref 0.1–0.8)
MONOCYTES NFR BLD: 9 % (ref 1–7)
MORPHOLOGY: ABNORMAL
MORPHOLOGY: ABNORMAL
NEUTROPHILS NFR BLD: 80 % (ref 36–66)
NEUTS SEG NFR BLD: 8.32 K/UL (ref 1.8–7.7)
NRBC BLD-RTO: 0 PER 100 WBC
PLATELET # BLD AUTO: 154 K/UL (ref 138–453)
PMV BLD AUTO: 9.3 FL (ref 8.1–13.5)
POTASSIUM SERPL-SCNC: 4 MMOL/L (ref 3.7–5.3)
PROT SERPL-MCNC: 5.4 G/DL (ref 6.4–8.3)
RBC # BLD AUTO: 3.36 M/UL (ref 4.21–5.77)
SODIUM SERPL-SCNC: 146 MMOL/L (ref 135–144)
VANCOMYCIN SERPL-MCNC: 28.4 UG/ML
WBC OTHER # BLD: 10.4 K/UL (ref 3.5–11.3)

## 2023-10-23 PROCEDURE — 6370000000 HC RX 637 (ALT 250 FOR IP)

## 2023-10-23 PROCEDURE — 2700000000 HC OXYGEN THERAPY PER DAY

## 2023-10-23 PROCEDURE — 2500000003 HC RX 250 WO HCPCS

## 2023-10-23 PROCEDURE — 94003 VENT MGMT INPAT SUBQ DAY: CPT

## 2023-10-23 PROCEDURE — 6360000002 HC RX W HCPCS: Performed by: STUDENT IN AN ORGANIZED HEALTH CARE EDUCATION/TRAINING PROGRAM

## 2023-10-23 PROCEDURE — 6360000002 HC RX W HCPCS

## 2023-10-23 PROCEDURE — 2500000003 HC RX 250 WO HCPCS: Performed by: STUDENT IN AN ORGANIZED HEALTH CARE EDUCATION/TRAINING PROGRAM

## 2023-10-23 PROCEDURE — 6370000000 HC RX 637 (ALT 250 FOR IP): Performed by: STUDENT IN AN ORGANIZED HEALTH CARE EDUCATION/TRAINING PROGRAM

## 2023-10-23 PROCEDURE — 94640 AIRWAY INHALATION TREATMENT: CPT

## 2023-10-23 PROCEDURE — 2580000003 HC RX 258

## 2023-10-23 PROCEDURE — 99291 CRITICAL CARE FIRST HOUR: CPT | Performed by: INTERNAL MEDICINE

## 2023-10-23 PROCEDURE — 2580000003 HC RX 258: Performed by: STUDENT IN AN ORGANIZED HEALTH CARE EDUCATION/TRAINING PROGRAM

## 2023-10-23 PROCEDURE — 73630 X-RAY EXAM OF FOOT: CPT

## 2023-10-23 PROCEDURE — 80202 ASSAY OF VANCOMYCIN: CPT

## 2023-10-23 PROCEDURE — 85025 COMPLETE CBC W/AUTO DIFF WBC: CPT

## 2023-10-23 PROCEDURE — 36415 COLL VENOUS BLD VENIPUNCTURE: CPT

## 2023-10-23 PROCEDURE — 80053 COMPREHEN METABOLIC PANEL: CPT

## 2023-10-23 PROCEDURE — 82947 ASSAY GLUCOSE BLOOD QUANT: CPT

## 2023-10-23 PROCEDURE — 2000000000 HC ICU R&B

## 2023-10-23 PROCEDURE — 94761 N-INVAS EAR/PLS OXIMETRY MLT: CPT

## 2023-10-23 PROCEDURE — 2580000003 HC RX 258: Performed by: INTERNAL MEDICINE

## 2023-10-23 PROCEDURE — 2580000003 HC RX 258: Performed by: PEDIATRICS

## 2023-10-23 PROCEDURE — 6370000000 HC RX 637 (ALT 250 FOR IP): Performed by: INTERNAL MEDICINE

## 2023-10-23 RX ORDER — NOREPINEPHRINE BITARTRATE 0.06 MG/ML
1-100 INJECTION, SOLUTION INTRAVENOUS CONTINUOUS
Status: DISCONTINUED | OUTPATIENT
Start: 2023-10-23 | End: 2023-10-24

## 2023-10-23 RX ORDER — HEPARIN SODIUM 5000 [USP'U]/ML
5000 INJECTION, SOLUTION INTRAVENOUS; SUBCUTANEOUS EVERY 8 HOURS SCHEDULED
Status: DISCONTINUED | OUTPATIENT
Start: 2023-10-23 | End: 2023-11-01

## 2023-10-23 RX ORDER — LIDOCAINE HYDROCHLORIDE 10 MG/ML
10 INJECTION, SOLUTION EPIDURAL; INFILTRATION; INTRACAUDAL; PERINEURAL ONCE
Status: DISCONTINUED | OUTPATIENT
Start: 2023-10-23 | End: 2023-10-26

## 2023-10-23 RX ORDER — AMIODARONE HYDROCHLORIDE 200 MG/1
200 TABLET ORAL 2 TIMES DAILY
Status: DISCONTINUED | OUTPATIENT
Start: 2023-10-23 | End: 2023-11-09 | Stop reason: HOSPADM

## 2023-10-23 RX ADMIN — MEROPENEM 1000 MG: 1 INJECTION, POWDER, FOR SOLUTION INTRAVENOUS at 08:51

## 2023-10-23 RX ADMIN — HEPARIN SODIUM 5000 UNITS: 5000 INJECTION INTRAVENOUS; SUBCUTANEOUS at 21:33

## 2023-10-23 RX ADMIN — SODIUM CHLORIDE, PRESERVATIVE FREE 10 ML: 5 INJECTION INTRAVENOUS at 08:47

## 2023-10-23 RX ADMIN — IPRATROPIUM BROMIDE AND ALBUTEROL SULFATE 1 DOSE: 2.5; .5 SOLUTION RESPIRATORY (INHALATION) at 07:45

## 2023-10-23 RX ADMIN — MEROPENEM 1000 MG: 1 INJECTION, POWDER, FOR SOLUTION INTRAVENOUS at 16:13

## 2023-10-23 RX ADMIN — Medication 5 MCG/MIN: at 09:02

## 2023-10-23 RX ADMIN — PHENYLEPHRINE HYDROCHLORIDE 200 MCG/MIN: 10 INJECTION INTRAVENOUS at 02:27

## 2023-10-23 RX ADMIN — PHENYLEPHRINE HYDROCHLORIDE 275 MCG/MIN: 10 INJECTION INTRAVENOUS at 15:09

## 2023-10-23 RX ADMIN — VASOPRESSIN 0.03 UNITS/MIN: 20 INJECTION INTRAVENOUS at 03:35

## 2023-10-23 RX ADMIN — HYDROCORTISONE SODIUM SUCCINATE 100 MG: 100 INJECTION, POWDER, FOR SOLUTION INTRAMUSCULAR; INTRAVENOUS at 19:46

## 2023-10-23 RX ADMIN — PHENYLEPHRINE HYDROCHLORIDE 300 MCG/MIN: 10 INJECTION INTRAVENOUS at 09:30

## 2023-10-23 RX ADMIN — Medication 175 MCG/HR: at 03:48

## 2023-10-23 RX ADMIN — DOCUSATE SODIUM LIQUID 100 MG: 100 LIQUID ORAL at 08:47

## 2023-10-23 RX ADMIN — VASOPRESSIN 0.04 UNITS/MIN: 20 INJECTION, SOLUTION INTRAVENOUS at 13:13

## 2023-10-23 RX ADMIN — Medication 100 MCG/HR: at 19:48

## 2023-10-23 RX ADMIN — DOCUSATE SODIUM LIQUID 100 MG: 100 LIQUID ORAL at 19:46

## 2023-10-23 RX ADMIN — IPRATROPIUM BROMIDE AND ALBUTEROL SULFATE 1 DOSE: 2.5; .5 SOLUTION RESPIRATORY (INHALATION) at 15:26

## 2023-10-23 RX ADMIN — SODIUM CHLORIDE, PRESERVATIVE FREE 10 ML: 5 INJECTION INTRAVENOUS at 19:45

## 2023-10-23 RX ADMIN — Medication 4 MG/HR: at 04:49

## 2023-10-23 RX ADMIN — IPRATROPIUM BROMIDE AND ALBUTEROL SULFATE 1 DOSE: 2.5; .5 SOLUTION RESPIRATORY (INHALATION) at 19:44

## 2023-10-23 RX ADMIN — SODIUM CHLORIDE: 9 INJECTION, SOLUTION INTRAVENOUS at 21:50

## 2023-10-23 RX ADMIN — SODIUM CHLORIDE: 9 INJECTION, SOLUTION INTRAVENOUS at 03:42

## 2023-10-23 RX ADMIN — AMIODARONE HYDROCHLORIDE 0.5 MG/MIN: 50 INJECTION, SOLUTION INTRAVENOUS at 03:48

## 2023-10-23 RX ADMIN — MIDODRINE HYDROCHLORIDE 10 MG: 5 TABLET ORAL at 21:34

## 2023-10-23 RX ADMIN — SODIUM CHLORIDE, PRESERVATIVE FREE 20 MG: 5 INJECTION INTRAVENOUS at 21:34

## 2023-10-23 RX ADMIN — IPRATROPIUM BROMIDE AND ALBUTEROL SULFATE 1 DOSE: 2.5; .5 SOLUTION RESPIRATORY (INHALATION) at 11:25

## 2023-10-23 RX ADMIN — HYDROCORTISONE SODIUM SUCCINATE 100 MG: 100 INJECTION, POWDER, FOR SOLUTION INTRAMUSCULAR; INTRAVENOUS at 13:01

## 2023-10-23 RX ADMIN — AMIODARONE HYDROCHLORIDE 200 MG: 200 TABLET ORAL at 13:01

## 2023-10-23 RX ADMIN — Medication 5 MCG/MIN: at 09:51

## 2023-10-23 RX ADMIN — VASOPRESSIN 0.04 UNITS/MIN: 20 INJECTION, SOLUTION INTRAVENOUS at 23:17

## 2023-10-23 RX ADMIN — SODIUM CHLORIDE, PRESERVATIVE FREE 20 MG: 5 INJECTION INTRAVENOUS at 09:18

## 2023-10-23 RX ADMIN — ASPIRIN 81 MG: 81 TABLET, CHEWABLE ORAL at 08:47

## 2023-10-23 RX ADMIN — MIDODRINE HYDROCHLORIDE 10 MG: 5 TABLET ORAL at 13:00

## 2023-10-23 RX ADMIN — MIDODRINE HYDROCHLORIDE 10 MG: 5 TABLET ORAL at 05:43

## 2023-10-23 RX ADMIN — SODIUM CHLORIDE: 9 INJECTION, SOLUTION INTRAVENOUS at 03:40

## 2023-10-23 RX ADMIN — AMIODARONE HYDROCHLORIDE 200 MG: 200 TABLET ORAL at 19:46

## 2023-10-23 RX ADMIN — HEPARIN SODIUM 5000 UNITS: 5000 INJECTION INTRAVENOUS; SUBCUTANEOUS at 14:08

## 2023-10-23 RX ADMIN — SODIUM CHLORIDE, PRESERVATIVE FREE 10 ML: 5 INJECTION INTRAVENOUS at 19:46

## 2023-10-23 ASSESSMENT — PULMONARY FUNCTION TESTS
PIF_VALUE: 33
PIF_VALUE: 27
PIF_VALUE: 17
PIF_VALUE: 26
PIF_VALUE: 22
PIF_VALUE: 29

## 2023-10-23 ASSESSMENT — PAIN SCALES - GENERAL: PAINLEVEL_OUTOF10: 0

## 2023-10-24 ENCOUNTER — APPOINTMENT (OUTPATIENT)
Dept: GENERAL RADIOLOGY | Age: 58
DRG: 004 | End: 2023-10-24
Payer: MEDICARE

## 2023-10-24 LAB
ALBUMIN SERPL-MCNC: 2.6 G/DL (ref 3.5–5.2)
ALBUMIN/GLOB SERPL: 0.8 {RATIO} (ref 1–2.5)
ALP SERPL-CCNC: 132 U/L (ref 40–129)
ALT SERPL-CCNC: 15 U/L (ref 5–41)
ANION GAP SERPL CALCULATED.3IONS-SCNC: 12 MMOL/L (ref 9–17)
AST SERPL-CCNC: 26 U/L
BASOPHILS # BLD: 0 K/UL (ref 0–0.2)
BASOPHILS NFR BLD: 0 % (ref 0–2)
BILIRUB SERPL-MCNC: 0.7 MG/DL (ref 0.3–1.2)
BUN SERPL-MCNC: 18 MG/DL (ref 6–20)
CA-I BLD-SCNC: 1.18 MMOL/L (ref 1.13–1.33)
CALCIUM SERPL-MCNC: 8.4 MG/DL (ref 8.6–10.4)
CHLORIDE SERPL-SCNC: 105 MMOL/L (ref 98–107)
CO2 SERPL-SCNC: 29 MMOL/L (ref 20–31)
CREAT SERPL-MCNC: 1.2 MG/DL (ref 0.7–1.2)
EOSINOPHIL # BLD: 0 K/UL (ref 0–0.4)
EOSINOPHILS RELATIVE PERCENT: 0 % (ref 1–4)
ERYTHROCYTE [DISTWIDTH] IN BLOOD BY AUTOMATED COUNT: 17.4 % (ref 11.8–14.4)
GFR SERPL CREATININE-BSD FRML MDRD: >60 ML/MIN/1.73M2
GLUCOSE BLD-MCNC: 191 MG/DL (ref 75–110)
GLUCOSE BLD-MCNC: 193 MG/DL (ref 75–110)
GLUCOSE BLD-MCNC: 196 MG/DL (ref 75–110)
GLUCOSE SERPL-MCNC: 193 MG/DL (ref 70–99)
HCT VFR BLD AUTO: 35.6 % (ref 40.7–50.3)
HGB BLD-MCNC: 9.3 G/DL (ref 13–17)
IMM GRANULOCYTES # BLD AUTO: 0 K/UL (ref 0–0.3)
IMM GRANULOCYTES NFR BLD: 0 %
LYMPHOCYTES NFR BLD: 0.73 K/UL (ref 1–4.8)
LYMPHOCYTES RELATIVE PERCENT: 9 % (ref 24–44)
MCH RBC QN AUTO: 25.9 PG (ref 25.2–33.5)
MCHC RBC AUTO-ENTMCNC: 26.1 G/DL (ref 28.4–34.8)
MCV RBC AUTO: 99.2 FL (ref 82.6–102.9)
MICROORGANISM SPEC CULT: NORMAL
MICROORGANISM SPEC CULT: NORMAL
MONOCYTES NFR BLD: 0.08 K/UL (ref 0.1–0.8)
MONOCYTES NFR BLD: 1 % (ref 1–7)
MORPHOLOGY: ABNORMAL
MORPHOLOGY: ABNORMAL
NEUTROPHILS NFR BLD: 90 % (ref 36–66)
NEUTS SEG NFR BLD: 7.29 K/UL (ref 1.8–7.7)
NRBC BLD-RTO: 0 PER 100 WBC
PLATELET # BLD AUTO: 134 K/UL (ref 138–453)
PMV BLD AUTO: 10.1 FL (ref 8.1–13.5)
POTASSIUM SERPL-SCNC: 4.9 MMOL/L (ref 3.7–5.3)
PROT SERPL-MCNC: 5.7 G/DL (ref 6.4–8.3)
RBC # BLD AUTO: 3.59 M/UL (ref 4.21–5.77)
REASON FOR REJECTION: NORMAL
SERVICE CMNT-IMP: NORMAL
SERVICE CMNT-IMP: NORMAL
SODIUM SERPL-SCNC: 146 MMOL/L (ref 135–144)
SPECIMEN DESCRIPTION: NORMAL
SPECIMEN DESCRIPTION: NORMAL
SPECIMEN SOURCE: NORMAL
VANCOMYCIN SERPL-MCNC: 18.8 UG/ML
WBC OTHER # BLD: 8.1 K/UL (ref 3.5–11.3)
ZZ NTE CLEAN UP: ORDERED TEST: NORMAL

## 2023-10-24 PROCEDURE — 2580000003 HC RX 258: Performed by: STUDENT IN AN ORGANIZED HEALTH CARE EDUCATION/TRAINING PROGRAM

## 2023-10-24 PROCEDURE — 82947 ASSAY GLUCOSE BLOOD QUANT: CPT

## 2023-10-24 PROCEDURE — 80053 COMPREHEN METABOLIC PANEL: CPT

## 2023-10-24 PROCEDURE — 82330 ASSAY OF CALCIUM: CPT

## 2023-10-24 PROCEDURE — 2580000003 HC RX 258: Performed by: PEDIATRICS

## 2023-10-24 PROCEDURE — 94003 VENT MGMT INPAT SUBQ DAY: CPT

## 2023-10-24 PROCEDURE — 6370000000 HC RX 637 (ALT 250 FOR IP)

## 2023-10-24 PROCEDURE — 80202 ASSAY OF VANCOMYCIN: CPT

## 2023-10-24 PROCEDURE — 94640 AIRWAY INHALATION TREATMENT: CPT

## 2023-10-24 PROCEDURE — 6360000002 HC RX W HCPCS: Performed by: STUDENT IN AN ORGANIZED HEALTH CARE EDUCATION/TRAINING PROGRAM

## 2023-10-24 PROCEDURE — 2700000000 HC OXYGEN THERAPY PER DAY

## 2023-10-24 PROCEDURE — 36415 COLL VENOUS BLD VENIPUNCTURE: CPT

## 2023-10-24 PROCEDURE — 6360000002 HC RX W HCPCS: Performed by: INTERNAL MEDICINE

## 2023-10-24 PROCEDURE — 2580000003 HC RX 258: Performed by: INTERNAL MEDICINE

## 2023-10-24 PROCEDURE — 2000000000 HC ICU R&B

## 2023-10-24 PROCEDURE — 85025 COMPLETE CBC W/AUTO DIFF WBC: CPT

## 2023-10-24 PROCEDURE — 6370000000 HC RX 637 (ALT 250 FOR IP): Performed by: STUDENT IN AN ORGANIZED HEALTH CARE EDUCATION/TRAINING PROGRAM

## 2023-10-24 PROCEDURE — 2580000003 HC RX 258

## 2023-10-24 PROCEDURE — 71045 X-RAY EXAM CHEST 1 VIEW: CPT

## 2023-10-24 PROCEDURE — 2500000003 HC RX 250 WO HCPCS: Performed by: STUDENT IN AN ORGANIZED HEALTH CARE EDUCATION/TRAINING PROGRAM

## 2023-10-24 PROCEDURE — 94761 N-INVAS EAR/PLS OXIMETRY MLT: CPT

## 2023-10-24 PROCEDURE — 6360000002 HC RX W HCPCS

## 2023-10-24 PROCEDURE — 99291 CRITICAL CARE FIRST HOUR: CPT | Performed by: INTERNAL MEDICINE

## 2023-10-24 PROCEDURE — 6370000000 HC RX 637 (ALT 250 FOR IP): Performed by: INTERNAL MEDICINE

## 2023-10-24 RX ORDER — GINSENG 100 MG
CAPSULE ORAL PRN
Status: DISCONTINUED | OUTPATIENT
Start: 2023-10-24 | End: 2023-11-09 | Stop reason: HOSPADM

## 2023-10-24 RX ORDER — FUROSEMIDE 10 MG/ML
20 INJECTION INTRAMUSCULAR; INTRAVENOUS ONCE
Status: COMPLETED | OUTPATIENT
Start: 2023-10-24 | End: 2023-10-24

## 2023-10-24 RX ADMIN — DOCUSATE SODIUM LIQUID 100 MG: 100 LIQUID ORAL at 08:40

## 2023-10-24 RX ADMIN — PHENYLEPHRINE HYDROCHLORIDE 80 MCG/MIN: 10 INJECTION INTRAVENOUS at 06:54

## 2023-10-24 RX ADMIN — SODIUM CHLORIDE, PRESERVATIVE FREE 20 MG: 5 INJECTION INTRAVENOUS at 20:25

## 2023-10-24 RX ADMIN — HEPARIN SODIUM 5000 UNITS: 5000 INJECTION INTRAVENOUS; SUBCUTANEOUS at 05:54

## 2023-10-24 RX ADMIN — IPRATROPIUM BROMIDE AND ALBUTEROL SULFATE 1 DOSE: 2.5; .5 SOLUTION RESPIRATORY (INHALATION) at 11:19

## 2023-10-24 RX ADMIN — MIDODRINE HYDROCHLORIDE 10 MG: 5 TABLET ORAL at 12:37

## 2023-10-24 RX ADMIN — AMIODARONE HYDROCHLORIDE 200 MG: 200 TABLET ORAL at 08:40

## 2023-10-24 RX ADMIN — HYDROCORTISONE SODIUM SUCCINATE 100 MG: 100 INJECTION, POWDER, FOR SOLUTION INTRAMUSCULAR; INTRAVENOUS at 12:44

## 2023-10-24 RX ADMIN — MIDODRINE HYDROCHLORIDE 10 MG: 5 TABLET ORAL at 05:54

## 2023-10-24 RX ADMIN — AMIODARONE HYDROCHLORIDE 200 MG: 200 TABLET ORAL at 20:27

## 2023-10-24 RX ADMIN — VASOPRESSIN 0.04 UNITS/MIN: 20 INJECTION, SOLUTION INTRAVENOUS at 10:20

## 2023-10-24 RX ADMIN — HEPARIN SODIUM 5000 UNITS: 5000 INJECTION INTRAVENOUS; SUBCUTANEOUS at 21:25

## 2023-10-24 RX ADMIN — SODIUM CHLORIDE, PRESERVATIVE FREE 10 ML: 5 INJECTION INTRAVENOUS at 08:59

## 2023-10-24 RX ADMIN — MEROPENEM 1000 MG: 1 INJECTION, POWDER, FOR SOLUTION INTRAVENOUS at 00:54

## 2023-10-24 RX ADMIN — SODIUM CHLORIDE, PRESERVATIVE FREE 10 ML: 5 INJECTION INTRAVENOUS at 20:35

## 2023-10-24 RX ADMIN — SODIUM CHLORIDE, PRESERVATIVE FREE 20 MG: 5 INJECTION INTRAVENOUS at 08:41

## 2023-10-24 RX ADMIN — Medication 4 MG/HR: at 04:33

## 2023-10-24 RX ADMIN — BACITRACIN: 500 OINTMENT TOPICAL at 17:00

## 2023-10-24 RX ADMIN — MEROPENEM 1000 MG: 1 INJECTION, POWDER, FOR SOLUTION INTRAVENOUS at 16:06

## 2023-10-24 RX ADMIN — Medication 100 MCG/HR: at 22:58

## 2023-10-24 RX ADMIN — HEPARIN SODIUM 5000 UNITS: 5000 INJECTION INTRAVENOUS; SUBCUTANEOUS at 14:36

## 2023-10-24 RX ADMIN — MIDODRINE HYDROCHLORIDE 10 MG: 5 TABLET ORAL at 21:25

## 2023-10-24 RX ADMIN — IPRATROPIUM BROMIDE AND ALBUTEROL SULFATE 1 DOSE: 2.5; .5 SOLUTION RESPIRATORY (INHALATION) at 08:11

## 2023-10-24 RX ADMIN — IPRATROPIUM BROMIDE AND ALBUTEROL SULFATE 1 DOSE: 2.5; .5 SOLUTION RESPIRATORY (INHALATION) at 15:53

## 2023-10-24 RX ADMIN — MEROPENEM 1000 MG: 1 INJECTION, POWDER, FOR SOLUTION INTRAVENOUS at 23:54

## 2023-10-24 RX ADMIN — DOCUSATE SODIUM LIQUID 100 MG: 100 LIQUID ORAL at 20:28

## 2023-10-24 RX ADMIN — SODIUM CHLORIDE, PRESERVATIVE FREE 10 ML: 5 INJECTION INTRAVENOUS at 09:01

## 2023-10-24 RX ADMIN — IPRATROPIUM BROMIDE AND ALBUTEROL SULFATE 1 DOSE: 2.5; .5 SOLUTION RESPIRATORY (INHALATION) at 19:46

## 2023-10-24 RX ADMIN — ASPIRIN 81 MG: 81 TABLET, CHEWABLE ORAL at 08:40

## 2023-10-24 RX ADMIN — HYDROCORTISONE SODIUM SUCCINATE 100 MG: 100 INJECTION, POWDER, FOR SOLUTION INTRAMUSCULAR; INTRAVENOUS at 20:36

## 2023-10-24 RX ADMIN — HYDROCORTISONE SODIUM SUCCINATE 100 MG: 100 INJECTION, POWDER, FOR SOLUTION INTRAMUSCULAR; INTRAVENOUS at 04:20

## 2023-10-24 RX ADMIN — SODIUM CHLORIDE: 9 INJECTION, SOLUTION INTRAVENOUS at 04:32

## 2023-10-24 RX ADMIN — FUROSEMIDE 20 MG: 10 INJECTION, SOLUTION INTRAMUSCULAR; INTRAVENOUS at 10:16

## 2023-10-24 RX ADMIN — MEROPENEM 1000 MG: 1 INJECTION, POWDER, FOR SOLUTION INTRAVENOUS at 08:14

## 2023-10-24 RX ADMIN — VANCOMYCIN HYDROCHLORIDE 1000 MG: 1 INJECTION, POWDER, LYOPHILIZED, FOR SOLUTION INTRAVENOUS at 12:41

## 2023-10-24 ASSESSMENT — PULMONARY FUNCTION TESTS
PIF_VALUE: 29
PIF_VALUE: 30
PIF_VALUE: 29
PIF_VALUE: 26
PIF_VALUE: 28
PIF_VALUE: 30
PIF_VALUE: 27

## 2023-10-25 LAB
ALBUMIN SERPL-MCNC: 2.6 G/DL (ref 3.5–5.2)
ALBUMIN/GLOB SERPL: 0.9 {RATIO} (ref 1–2.5)
ALP SERPL-CCNC: 111 U/L (ref 40–129)
ALT SERPL-CCNC: 12 U/L (ref 5–41)
ANION GAP SERPL CALCULATED.3IONS-SCNC: 8 MMOL/L (ref 9–17)
AST SERPL-CCNC: 10 U/L
BASOPHILS # BLD: 0 K/UL (ref 0–0.2)
BASOPHILS NFR BLD: 0 % (ref 0–2)
BILIRUB SERPL-MCNC: 0.5 MG/DL (ref 0.3–1.2)
BUN SERPL-MCNC: 24 MG/DL (ref 6–20)
CALCIUM SERPL-MCNC: 8.6 MG/DL (ref 8.6–10.4)
CHLORIDE SERPL-SCNC: 105 MMOL/L (ref 98–107)
CO2 SERPL-SCNC: 33 MMOL/L (ref 20–31)
CREAT SERPL-MCNC: 1.4 MG/DL (ref 0.7–1.2)
EOSINOPHIL # BLD: 0 K/UL (ref 0–0.44)
EOSINOPHILS RELATIVE PERCENT: 0 % (ref 1–4)
ERYTHROCYTE [DISTWIDTH] IN BLOOD BY AUTOMATED COUNT: 17.2 % (ref 11.8–14.4)
GFR SERPL CREATININE-BSD FRML MDRD: 58 ML/MIN/1.73M2
GLUCOSE BLD-MCNC: 200 MG/DL (ref 75–110)
GLUCOSE BLD-MCNC: 218 MG/DL (ref 75–110)
GLUCOSE BLD-MCNC: 229 MG/DL (ref 75–110)
GLUCOSE SERPL-MCNC: 220 MG/DL (ref 70–99)
HCT VFR BLD AUTO: 30 % (ref 40.7–50.3)
HGB BLD-MCNC: 8.4 G/DL (ref 13–17)
IMM GRANULOCYTES # BLD AUTO: 0.06 K/UL (ref 0–0.3)
IMM GRANULOCYTES NFR BLD: 1 %
LYMPHOCYTES NFR BLD: 0.46 K/UL (ref 1.1–3.7)
LYMPHOCYTES RELATIVE PERCENT: 8 % (ref 24–43)
MCH RBC QN AUTO: 25.9 PG (ref 25.2–33.5)
MCHC RBC AUTO-ENTMCNC: 28 G/DL (ref 28.4–34.8)
MCV RBC AUTO: 92.6 FL (ref 82.6–102.9)
MONOCYTES NFR BLD: 0.29 K/UL (ref 0.1–1.2)
MONOCYTES NFR BLD: 5 % (ref 3–12)
MORPHOLOGY: ABNORMAL
MORPHOLOGY: ABNORMAL
NEUTROPHILS NFR BLD: 86 % (ref 36–65)
NEUTS SEG NFR BLD: 4.89 K/UL (ref 1.5–8.1)
NRBC BLD-RTO: 0 PER 100 WBC
PLATELET # BLD AUTO: 135 K/UL (ref 138–453)
PMV BLD AUTO: 10.3 FL (ref 8.1–13.5)
POTASSIUM SERPL-SCNC: 4.1 MMOL/L (ref 3.7–5.3)
PROT SERPL-MCNC: 5.6 G/DL (ref 6.4–8.3)
RBC # BLD AUTO: 3.24 M/UL (ref 4.21–5.77)
SODIUM SERPL-SCNC: 146 MMOL/L (ref 135–144)
VANCOMYCIN SERPL-MCNC: 23.1 UG/ML
WBC OTHER # BLD: 5.7 K/UL (ref 3.5–11.3)

## 2023-10-25 PROCEDURE — 36415 COLL VENOUS BLD VENIPUNCTURE: CPT

## 2023-10-25 PROCEDURE — 2000000000 HC ICU R&B

## 2023-10-25 PROCEDURE — 2580000003 HC RX 258: Performed by: STUDENT IN AN ORGANIZED HEALTH CARE EDUCATION/TRAINING PROGRAM

## 2023-10-25 PROCEDURE — 80053 COMPREHEN METABOLIC PANEL: CPT

## 2023-10-25 PROCEDURE — 80202 ASSAY OF VANCOMYCIN: CPT

## 2023-10-25 PROCEDURE — 6370000000 HC RX 637 (ALT 250 FOR IP): Performed by: STUDENT IN AN ORGANIZED HEALTH CARE EDUCATION/TRAINING PROGRAM

## 2023-10-25 PROCEDURE — 2500000003 HC RX 250 WO HCPCS: Performed by: STUDENT IN AN ORGANIZED HEALTH CARE EDUCATION/TRAINING PROGRAM

## 2023-10-25 PROCEDURE — 94003 VENT MGMT INPAT SUBQ DAY: CPT

## 2023-10-25 PROCEDURE — 2580000003 HC RX 258: Performed by: PEDIATRICS

## 2023-10-25 PROCEDURE — 6370000000 HC RX 637 (ALT 250 FOR IP)

## 2023-10-25 PROCEDURE — 6360000002 HC RX W HCPCS

## 2023-10-25 PROCEDURE — 2700000000 HC OXYGEN THERAPY PER DAY

## 2023-10-25 PROCEDURE — 82947 ASSAY GLUCOSE BLOOD QUANT: CPT

## 2023-10-25 PROCEDURE — 6370000000 HC RX 637 (ALT 250 FOR IP): Performed by: INTERNAL MEDICINE

## 2023-10-25 PROCEDURE — 6360000002 HC RX W HCPCS: Performed by: STUDENT IN AN ORGANIZED HEALTH CARE EDUCATION/TRAINING PROGRAM

## 2023-10-25 PROCEDURE — 2580000003 HC RX 258

## 2023-10-25 PROCEDURE — 94761 N-INVAS EAR/PLS OXIMETRY MLT: CPT

## 2023-10-25 PROCEDURE — 85025 COMPLETE CBC W/AUTO DIFF WBC: CPT

## 2023-10-25 PROCEDURE — 99291 CRITICAL CARE FIRST HOUR: CPT | Performed by: INTERNAL MEDICINE

## 2023-10-25 PROCEDURE — 94640 AIRWAY INHALATION TREATMENT: CPT

## 2023-10-25 RX ORDER — FUROSEMIDE 10 MG/ML
20 INJECTION INTRAMUSCULAR; INTRAVENOUS ONCE
Status: COMPLETED | OUTPATIENT
Start: 2023-10-25 | End: 2023-10-25

## 2023-10-25 RX ORDER — MIDODRINE HYDROCHLORIDE 5 MG/1
10 TABLET ORAL EVERY 6 HOURS
Status: DISCONTINUED | OUTPATIENT
Start: 2023-10-25 | End: 2023-11-04

## 2023-10-25 RX ADMIN — SODIUM CHLORIDE, PRESERVATIVE FREE 10 ML: 5 INJECTION INTRAVENOUS at 21:51

## 2023-10-25 RX ADMIN — SODIUM CHLORIDE, PRESERVATIVE FREE 10 ML: 5 INJECTION INTRAVENOUS at 08:19

## 2023-10-25 RX ADMIN — HEPARIN SODIUM 5000 UNITS: 5000 INJECTION INTRAVENOUS; SUBCUTANEOUS at 13:20

## 2023-10-25 RX ADMIN — PHENYLEPHRINE HYDROCHLORIDE 30 MCG/MIN: 10 INJECTION INTRAVENOUS at 16:20

## 2023-10-25 RX ADMIN — MIDODRINE HYDROCHLORIDE 10 MG: 5 TABLET ORAL at 19:18

## 2023-10-25 RX ADMIN — SODIUM CHLORIDE: 9 INJECTION, SOLUTION INTRAVENOUS at 02:57

## 2023-10-25 RX ADMIN — FUROSEMIDE 10 MG/HR: 10 INJECTION, SOLUTION INTRAMUSCULAR; INTRAVENOUS at 14:28

## 2023-10-25 RX ADMIN — HYDROCORTISONE SODIUM SUCCINATE 100 MG: 100 INJECTION, POWDER, FOR SOLUTION INTRAMUSCULAR; INTRAVENOUS at 20:11

## 2023-10-25 RX ADMIN — HYDROCORTISONE SODIUM SUCCINATE 100 MG: 100 INJECTION, POWDER, FOR SOLUTION INTRAMUSCULAR; INTRAVENOUS at 05:25

## 2023-10-25 RX ADMIN — HEPARIN SODIUM 5000 UNITS: 5000 INJECTION INTRAVENOUS; SUBCUTANEOUS at 05:31

## 2023-10-25 RX ADMIN — SODIUM CHLORIDE, PRESERVATIVE FREE 20 MG: 5 INJECTION INTRAVENOUS at 08:14

## 2023-10-25 RX ADMIN — AMIODARONE HYDROCHLORIDE 200 MG: 200 TABLET ORAL at 08:19

## 2023-10-25 RX ADMIN — IPRATROPIUM BROMIDE AND ALBUTEROL SULFATE 1 DOSE: 2.5; .5 SOLUTION RESPIRATORY (INHALATION) at 11:51

## 2023-10-25 RX ADMIN — FUROSEMIDE 20 MG: 10 INJECTION, SOLUTION INTRAMUSCULAR; INTRAVENOUS at 08:10

## 2023-10-25 RX ADMIN — ASPIRIN 81 MG: 81 TABLET, CHEWABLE ORAL at 08:19

## 2023-10-25 RX ADMIN — METOPROLOL TARTRATE 25 MG: 25 TABLET, FILM COATED ORAL at 13:21

## 2023-10-25 RX ADMIN — HEPARIN SODIUM 5000 UNITS: 5000 INJECTION INTRAVENOUS; SUBCUTANEOUS at 21:46

## 2023-10-25 RX ADMIN — HYDROCORTISONE SODIUM SUCCINATE 100 MG: 100 INJECTION, POWDER, FOR SOLUTION INTRAMUSCULAR; INTRAVENOUS at 13:21

## 2023-10-25 RX ADMIN — MEROPENEM 1000 MG: 1 INJECTION, POWDER, FOR SOLUTION INTRAVENOUS at 08:13

## 2023-10-25 RX ADMIN — MIDODRINE HYDROCHLORIDE 10 MG: 5 TABLET ORAL at 05:25

## 2023-10-25 RX ADMIN — SODIUM CHLORIDE 3000 MG: 900 INJECTION INTRAVENOUS at 19:15

## 2023-10-25 RX ADMIN — DOCUSATE SODIUM LIQUID 100 MG: 100 LIQUID ORAL at 08:19

## 2023-10-25 RX ADMIN — SODIUM CHLORIDE, PRESERVATIVE FREE 10 ML: 5 INJECTION INTRAVENOUS at 21:50

## 2023-10-25 RX ADMIN — SODIUM CHLORIDE 3000 MG: 900 INJECTION INTRAVENOUS at 14:30

## 2023-10-25 RX ADMIN — SODIUM CHLORIDE, PRESERVATIVE FREE 20 MG: 5 INJECTION INTRAVENOUS at 20:11

## 2023-10-25 RX ADMIN — IPRATROPIUM BROMIDE AND ALBUTEROL SULFATE 1 DOSE: 2.5; .5 SOLUTION RESPIRATORY (INHALATION) at 08:28

## 2023-10-25 RX ADMIN — Medication 4 MG/HR: at 03:01

## 2023-10-25 RX ADMIN — Medication 125 MCG/HR: at 15:10

## 2023-10-25 RX ADMIN — DOCUSATE SODIUM LIQUID 100 MG: 100 LIQUID ORAL at 20:10

## 2023-10-25 RX ADMIN — MIDODRINE HYDROCHLORIDE 10 MG: 5 TABLET ORAL at 13:21

## 2023-10-25 RX ADMIN — BACITRACIN: 500 OINTMENT TOPICAL at 15:24

## 2023-10-25 RX ADMIN — AMIODARONE HYDROCHLORIDE 200 MG: 200 TABLET ORAL at 20:10

## 2023-10-25 ASSESSMENT — PULMONARY FUNCTION TESTS
PIF_VALUE: 26
PIF_VALUE: 16
PIF_VALUE: 32
PIF_VALUE: 28

## 2023-10-26 LAB
ALBUMIN SERPL-MCNC: 2.5 G/DL (ref 3.5–5.2)
ALBUMIN/GLOB SERPL: 0.8 {RATIO} (ref 1–2.5)
ALLEN TEST: POSITIVE
ALLEN TEST: POSITIVE
ALP SERPL-CCNC: 105 U/L (ref 40–129)
ALT SERPL-CCNC: 14 U/L (ref 5–41)
ANION GAP SERPL CALCULATED.3IONS-SCNC: 13 MMOL/L (ref 9–17)
ANION GAP SERPL CALCULATED.3IONS-SCNC: 9 MMOL/L (ref 9–17)
ANION GAP SERPL CALCULATED.3IONS-SCNC: 9 MMOL/L (ref 9–17)
AST SERPL-CCNC: 18 U/L
BASOPHILS # BLD: 0 K/UL (ref 0–0.2)
BASOPHILS NFR BLD: 0 % (ref 0–2)
BILIRUB SERPL-MCNC: 0.4 MG/DL (ref 0.3–1.2)
BUN SERPL-MCNC: 28 MG/DL (ref 6–20)
BUN SERPL-MCNC: 30 MG/DL (ref 6–20)
BUN SERPL-MCNC: 31 MG/DL (ref 6–20)
CALCIUM SERPL-MCNC: 8.4 MG/DL (ref 8.6–10.4)
CALCIUM SERPL-MCNC: 8.5 MG/DL (ref 8.6–10.4)
CALCIUM SERPL-MCNC: 8.6 MG/DL (ref 8.6–10.4)
CHLORIDE SERPL-SCNC: 102 MMOL/L (ref 98–107)
CHLORIDE SERPL-SCNC: 102 MMOL/L (ref 98–107)
CHLORIDE SERPL-SCNC: 105 MMOL/L (ref 98–107)
CO2 SERPL-SCNC: 29 MMOL/L (ref 20–31)
CO2 SERPL-SCNC: 35 MMOL/L (ref 20–31)
CO2 SERPL-SCNC: 36 MMOL/L (ref 20–31)
CREAT SERPL-MCNC: 1.5 MG/DL (ref 0.7–1.2)
CREAT SERPL-MCNC: 1.5 MG/DL (ref 0.7–1.2)
CREAT SERPL-MCNC: 1.6 MG/DL (ref 0.7–1.2)
EOSINOPHIL # BLD: 0 K/UL (ref 0–0.4)
EOSINOPHILS RELATIVE PERCENT: 0 % (ref 1–4)
ERYTHROCYTE [DISTWIDTH] IN BLOOD BY AUTOMATED COUNT: 17.1 % (ref 11.8–14.4)
FIO2: 35
GFR SERPL CREATININE-BSD FRML MDRD: 49 ML/MIN/1.73M2
GFR SERPL CREATININE-BSD FRML MDRD: 53 ML/MIN/1.73M2
GFR SERPL CREATININE-BSD FRML MDRD: 53 ML/MIN/1.73M2
GLUCOSE BLD-MCNC: 218 MG/DL (ref 75–110)
GLUCOSE BLD-MCNC: 220 MG/DL (ref 74–100)
GLUCOSE BLD-MCNC: 240 MG/DL (ref 75–110)
GLUCOSE BLD-MCNC: 243 MG/DL (ref 74–100)
GLUCOSE SERPL-MCNC: 222 MG/DL (ref 70–99)
GLUCOSE SERPL-MCNC: 226 MG/DL (ref 70–99)
GLUCOSE SERPL-MCNC: 231 MG/DL (ref 70–99)
HCT VFR BLD AUTO: 32.7 % (ref 40.7–50.3)
HGB BLD-MCNC: 8.9 G/DL (ref 13–17)
IMM GRANULOCYTES # BLD AUTO: 0 K/UL (ref 0–0.3)
IMM GRANULOCYTES NFR BLD: 0 %
LYMPHOCYTES NFR BLD: 0.48 K/UL (ref 1–4.8)
LYMPHOCYTES RELATIVE PERCENT: 7 % (ref 24–44)
MAGNESIUM SERPL-MCNC: 2.1 MG/DL (ref 1.6–2.6)
MAGNESIUM SERPL-MCNC: 2.1 MG/DL (ref 1.6–2.6)
MCH RBC QN AUTO: 25.7 PG (ref 25.2–33.5)
MCHC RBC AUTO-ENTMCNC: 27.2 G/DL (ref 28.4–34.8)
MCV RBC AUTO: 94.5 FL (ref 82.6–102.9)
MODE: ABNORMAL
MONOCYTES NFR BLD: 0.27 K/UL (ref 0.1–0.8)
MONOCYTES NFR BLD: 4 % (ref 1–7)
MORPHOLOGY: ABNORMAL
NEUTROPHILS NFR BLD: 89 % (ref 36–66)
NEUTS SEG NFR BLD: 6.05 K/UL (ref 1.8–7.7)
NRBC BLD-RTO: 0 PER 100 WBC
O2 DELIVERY DEVICE: ABNORMAL
O2 DELIVERY DEVICE: ABNORMAL
PLATELET # BLD AUTO: 155 K/UL (ref 138–453)
PMV BLD AUTO: 10.9 FL (ref 8.1–13.5)
POC HCO3: 36.2 MMOL/L (ref 21–28)
POC HCO3: 40 MMOL/L (ref 21–28)
POC LACTIC ACID: 0.8 MMOL/L (ref 0.56–1.39)
POC O2 SATURATION: 95.7 % (ref 94–98)
POC O2 SATURATION: 96.1 % (ref 94–98)
POC PCO2: 57.2 MM HG (ref 35–48)
POC PCO2: 61.4 MM HG (ref 35–48)
POC PH: 7.41 (ref 7.35–7.45)
POC PH: 7.42 (ref 7.35–7.45)
POC PO2: 81.4 MM HG (ref 83–108)
POC PO2: 84.4 MM HG (ref 83–108)
POSITIVE BASE EXCESS, ART: 13.4 MMOL/L (ref 0–3)
POSITIVE BASE EXCESS, ART: 9.8 MMOL/L (ref 0–3)
POTASSIUM SERPL-SCNC: 3 MMOL/L (ref 3.7–5.3)
POTASSIUM SERPL-SCNC: 3.6 MMOL/L (ref 3.7–5.3)
POTASSIUM SERPL-SCNC: 4.1 MMOL/L (ref 3.7–5.3)
PROT SERPL-MCNC: 5.6 G/DL (ref 6.4–8.3)
RBC # BLD AUTO: 3.46 M/UL (ref 4.21–5.77)
SAMPLE SITE: ABNORMAL
SAMPLE SITE: ABNORMAL
SODIUM SERPL-SCNC: 144 MMOL/L (ref 135–144)
SODIUM SERPL-SCNC: 147 MMOL/L (ref 135–144)
SODIUM SERPL-SCNC: 149 MMOL/L (ref 135–144)
WBC OTHER # BLD: 6.8 K/UL (ref 3.5–11.3)

## 2023-10-26 PROCEDURE — 94761 N-INVAS EAR/PLS OXIMETRY MLT: CPT

## 2023-10-26 PROCEDURE — 6360000002 HC RX W HCPCS: Performed by: STUDENT IN AN ORGANIZED HEALTH CARE EDUCATION/TRAINING PROGRAM

## 2023-10-26 PROCEDURE — 6360000002 HC RX W HCPCS

## 2023-10-26 PROCEDURE — 2580000003 HC RX 258: Performed by: STUDENT IN AN ORGANIZED HEALTH CARE EDUCATION/TRAINING PROGRAM

## 2023-10-26 PROCEDURE — 80048 BASIC METABOLIC PNL TOTAL CA: CPT

## 2023-10-26 PROCEDURE — 2580000003 HC RX 258: Performed by: PEDIATRICS

## 2023-10-26 PROCEDURE — 2700000000 HC OXYGEN THERAPY PER DAY

## 2023-10-26 PROCEDURE — 82947 ASSAY GLUCOSE BLOOD QUANT: CPT

## 2023-10-26 PROCEDURE — 94003 VENT MGMT INPAT SUBQ DAY: CPT

## 2023-10-26 PROCEDURE — 6370000000 HC RX 637 (ALT 250 FOR IP): Performed by: STUDENT IN AN ORGANIZED HEALTH CARE EDUCATION/TRAINING PROGRAM

## 2023-10-26 PROCEDURE — 83605 ASSAY OF LACTIC ACID: CPT

## 2023-10-26 PROCEDURE — 83735 ASSAY OF MAGNESIUM: CPT

## 2023-10-26 PROCEDURE — 82803 BLOOD GASES ANY COMBINATION: CPT

## 2023-10-26 PROCEDURE — 36415 COLL VENOUS BLD VENIPUNCTURE: CPT

## 2023-10-26 PROCEDURE — 2500000003 HC RX 250 WO HCPCS: Performed by: STUDENT IN AN ORGANIZED HEALTH CARE EDUCATION/TRAINING PROGRAM

## 2023-10-26 PROCEDURE — 80053 COMPREHEN METABOLIC PANEL: CPT

## 2023-10-26 PROCEDURE — 99291 CRITICAL CARE FIRST HOUR: CPT | Performed by: INTERNAL MEDICINE

## 2023-10-26 PROCEDURE — 6370000000 HC RX 637 (ALT 250 FOR IP)

## 2023-10-26 PROCEDURE — 2000000000 HC ICU R&B

## 2023-10-26 PROCEDURE — 2580000003 HC RX 258

## 2023-10-26 PROCEDURE — 85025 COMPLETE CBC W/AUTO DIFF WBC: CPT

## 2023-10-26 PROCEDURE — 36600 WITHDRAWAL OF ARTERIAL BLOOD: CPT

## 2023-10-26 RX ORDER — INSULIN LISPRO 100 [IU]/ML
0-8 INJECTION, SOLUTION INTRAVENOUS; SUBCUTANEOUS EVERY 6 HOURS
Status: DISCONTINUED | OUTPATIENT
Start: 2023-10-26 | End: 2023-11-09 | Stop reason: HOSPADM

## 2023-10-26 RX ORDER — POTASSIUM CHLORIDE 29.8 MG/ML
20 INJECTION INTRAVENOUS ONCE
Status: COMPLETED | OUTPATIENT
Start: 2023-10-26 | End: 2023-10-26

## 2023-10-26 RX ORDER — FAMOTIDINE 20 MG/1
20 TABLET, FILM COATED ORAL 2 TIMES DAILY
Status: DISCONTINUED | OUTPATIENT
Start: 2023-10-26 | End: 2023-11-09 | Stop reason: HOSPADM

## 2023-10-26 RX ORDER — POTASSIUM CHLORIDE 29.8 MG/ML
20 INJECTION INTRAVENOUS
Status: COMPLETED | OUTPATIENT
Start: 2023-10-26 | End: 2023-10-26

## 2023-10-26 RX ADMIN — INSULIN LISPRO 2 UNITS: 100 INJECTION, SOLUTION INTRAVENOUS; SUBCUTANEOUS at 23:55

## 2023-10-26 RX ADMIN — MIDODRINE HYDROCHLORIDE 10 MG: 5 TABLET ORAL at 12:58

## 2023-10-26 RX ADMIN — SODIUM CHLORIDE 3000 MG: 900 INJECTION INTRAVENOUS at 20:19

## 2023-10-26 RX ADMIN — SODIUM CHLORIDE, PRESERVATIVE FREE 10 ML: 5 INJECTION INTRAVENOUS at 20:28

## 2023-10-26 RX ADMIN — POTASSIUM BICARBONATE 40 MEQ: 782 TABLET, EFFERVESCENT ORAL at 17:57

## 2023-10-26 RX ADMIN — FAMOTIDINE 20 MG: 20 TABLET, FILM COATED ORAL at 20:28

## 2023-10-26 RX ADMIN — MIDODRINE HYDROCHLORIDE 10 MG: 5 TABLET ORAL at 20:28

## 2023-10-26 RX ADMIN — INSULIN LISPRO 2 UNITS: 100 INJECTION, SOLUTION INTRAVENOUS; SUBCUTANEOUS at 16:50

## 2023-10-26 RX ADMIN — HEPARIN SODIUM 5000 UNITS: 5000 INJECTION INTRAVENOUS; SUBCUTANEOUS at 21:48

## 2023-10-26 RX ADMIN — HYDROCORTISONE SODIUM SUCCINATE 100 MG: 100 INJECTION, POWDER, FOR SOLUTION INTRAMUSCULAR; INTRAVENOUS at 20:43

## 2023-10-26 RX ADMIN — SODIUM CHLORIDE 3000 MG: 900 INJECTION INTRAVENOUS at 01:00

## 2023-10-26 RX ADMIN — AMIODARONE HYDROCHLORIDE 200 MG: 200 TABLET ORAL at 08:13

## 2023-10-26 RX ADMIN — DOCUSATE SODIUM LIQUID 100 MG: 100 LIQUID ORAL at 20:28

## 2023-10-26 RX ADMIN — POTASSIUM CHLORIDE 20 MEQ: 29.8 INJECTION, SOLUTION INTRAVENOUS at 19:16

## 2023-10-26 RX ADMIN — SODIUM CHLORIDE, PRESERVATIVE FREE 20 MG: 5 INJECTION INTRAVENOUS at 08:13

## 2023-10-26 RX ADMIN — SODIUM CHLORIDE 3000 MG: 900 INJECTION INTRAVENOUS at 12:57

## 2023-10-26 RX ADMIN — ASPIRIN 81 MG: 81 TABLET, CHEWABLE ORAL at 08:13

## 2023-10-26 RX ADMIN — MIDODRINE HYDROCHLORIDE 10 MG: 5 TABLET ORAL at 00:24

## 2023-10-26 RX ADMIN — HEPARIN SODIUM 5000 UNITS: 5000 INJECTION INTRAVENOUS; SUBCUTANEOUS at 05:29

## 2023-10-26 RX ADMIN — SODIUM CHLORIDE: 9 INJECTION, SOLUTION INTRAVENOUS at 01:56

## 2023-10-26 RX ADMIN — Medication 4 MG/HR: at 02:06

## 2023-10-26 RX ADMIN — FUROSEMIDE 15 MG/HR: 10 INJECTION, SOLUTION INTRAMUSCULAR; INTRAVENOUS at 01:53

## 2023-10-26 RX ADMIN — Medication 5 MG/HR: at 18:46

## 2023-10-26 RX ADMIN — HYDROCORTISONE SODIUM SUCCINATE 100 MG: 100 INJECTION, POWDER, FOR SOLUTION INTRAMUSCULAR; INTRAVENOUS at 14:22

## 2023-10-26 RX ADMIN — SODIUM CHLORIDE 3000 MG: 900 INJECTION INTRAVENOUS at 06:22

## 2023-10-26 RX ADMIN — Medication 100 MCG/HR: at 02:06

## 2023-10-26 RX ADMIN — Medication 125 MCG/HR: at 13:07

## 2023-10-26 RX ADMIN — PHENYLEPHRINE HYDROCHLORIDE 10 MCG/MIN: 10 INJECTION INTRAVENOUS at 01:59

## 2023-10-26 RX ADMIN — POTASSIUM CHLORIDE 20 MEQ: 29.8 INJECTION, SOLUTION INTRAVENOUS at 20:54

## 2023-10-26 RX ADMIN — BACITRACIN: 500 OINTMENT TOPICAL at 16:00

## 2023-10-26 RX ADMIN — POTASSIUM CHLORIDE 20 MEQ: 29.8 INJECTION, SOLUTION INTRAVENOUS at 17:57

## 2023-10-26 RX ADMIN — HEPARIN SODIUM 5000 UNITS: 5000 INJECTION INTRAVENOUS; SUBCUTANEOUS at 14:22

## 2023-10-26 RX ADMIN — Medication 7 MG/HR: at 23:59

## 2023-10-26 RX ADMIN — AMIODARONE HYDROCHLORIDE 200 MG: 200 TABLET ORAL at 20:28

## 2023-10-26 RX ADMIN — HYDROCORTISONE SODIUM SUCCINATE 100 MG: 100 INJECTION, POWDER, FOR SOLUTION INTRAMUSCULAR; INTRAVENOUS at 05:26

## 2023-10-26 RX ADMIN — MIDODRINE HYDROCHLORIDE 10 MG: 5 TABLET ORAL at 06:18

## 2023-10-26 ASSESSMENT — PULMONARY FUNCTION TESTS
PIF_VALUE: 34
PIF_VALUE: 32
PIF_VALUE: 34
PIF_VALUE: 30
PIF_VALUE: 20
PIF_VALUE: 34
PIF_VALUE: 32

## 2023-10-26 ASSESSMENT — PAIN SCALES - GENERAL: PAINLEVEL_OUTOF10: 4

## 2023-10-27 ENCOUNTER — APPOINTMENT (OUTPATIENT)
Dept: GENERAL RADIOLOGY | Age: 58
DRG: 004 | End: 2023-10-27
Payer: MEDICARE

## 2023-10-27 LAB
ALBUMIN SERPL-MCNC: 2.7 G/DL (ref 3.5–5.2)
ALBUMIN/GLOB SERPL: 0.8 {RATIO} (ref 1–2.5)
ALLEN TEST: POSITIVE
ALP SERPL-CCNC: 103 U/L (ref 40–129)
ALT SERPL-CCNC: 14 U/L (ref 5–41)
ANION GAP SERPL CALCULATED.3IONS-SCNC: 10 MMOL/L (ref 9–17)
ANION GAP SERPL CALCULATED.3IONS-SCNC: 12 MMOL/L (ref 9–17)
ANION GAP SERPL CALCULATED.3IONS-SCNC: 13 MMOL/L (ref 9–17)
ANION GAP SERPL CALCULATED.3IONS-SCNC: 8 MMOL/L (ref 9–17)
AST SERPL-CCNC: 11 U/L
BASOPHILS # BLD: 0 K/UL (ref 0–0.2)
BASOPHILS NFR BLD: 0 % (ref 0–2)
BILIRUB SERPL-MCNC: 0.5 MG/DL (ref 0.3–1.2)
BUN SERPL-MCNC: 31 MG/DL (ref 6–20)
BUN SERPL-MCNC: 32 MG/DL (ref 6–20)
BUN SERPL-MCNC: 35 MG/DL (ref 6–20)
BUN SERPL-MCNC: 35 MG/DL (ref 6–20)
CALCIUM SERPL-MCNC: 8.5 MG/DL (ref 8.6–10.4)
CALCIUM SERPL-MCNC: 8.6 MG/DL (ref 8.6–10.4)
CHLORIDE SERPL-SCNC: 101 MMOL/L (ref 98–107)
CHLORIDE SERPL-SCNC: 101 MMOL/L (ref 98–107)
CHLORIDE SERPL-SCNC: 102 MMOL/L (ref 98–107)
CHLORIDE SERPL-SCNC: 104 MMOL/L (ref 98–107)
CO2 SERPL-SCNC: 32 MMOL/L (ref 20–31)
CO2 SERPL-SCNC: 34 MMOL/L (ref 20–31)
CO2 SERPL-SCNC: 37 MMOL/L (ref 20–31)
CO2 SERPL-SCNC: 38 MMOL/L (ref 20–31)
CREAT SERPL-MCNC: 1.5 MG/DL (ref 0.7–1.2)
CREAT SERPL-MCNC: 1.6 MG/DL (ref 0.7–1.2)
EOSINOPHIL # BLD: 0 K/UL (ref 0–0.4)
EOSINOPHILS RELATIVE PERCENT: 0 % (ref 1–4)
ERYTHROCYTE [DISTWIDTH] IN BLOOD BY AUTOMATED COUNT: 17.1 % (ref 11.8–14.4)
FIO2: 35
FIO2: 40
GFR SERPL CREATININE-BSD FRML MDRD: 49 ML/MIN/1.73M2
GFR SERPL CREATININE-BSD FRML MDRD: 53 ML/MIN/1.73M2
GLUCOSE BLD-MCNC: 245 MG/DL (ref 74–100)
GLUCOSE BLD-MCNC: 249 MG/DL (ref 74–100)
GLUCOSE BLD-MCNC: 250 MG/DL (ref 75–110)
GLUCOSE BLD-MCNC: 255 MG/DL (ref 75–110)
GLUCOSE SERPL-MCNC: 202 MG/DL (ref 70–99)
GLUCOSE SERPL-MCNC: 226 MG/DL (ref 70–99)
GLUCOSE SERPL-MCNC: 243 MG/DL (ref 70–99)
GLUCOSE SERPL-MCNC: 253 MG/DL (ref 70–99)
HCT VFR BLD AUTO: 34 % (ref 40.7–50.3)
HGB BLD-MCNC: 9.7 G/DL (ref 13–17)
IMM GRANULOCYTES # BLD AUTO: 0.12 K/UL (ref 0–0.3)
IMM GRANULOCYTES NFR BLD: 2 %
LYMPHOCYTES NFR BLD: 0.41 K/UL (ref 1–4.8)
LYMPHOCYTES RELATIVE PERCENT: 7 % (ref 24–44)
MAGNESIUM SERPL-MCNC: 2.1 MG/DL (ref 1.6–2.6)
MAGNESIUM SERPL-MCNC: 2.1 MG/DL (ref 1.6–2.6)
MAGNESIUM SERPL-MCNC: 2.3 MG/DL (ref 1.6–2.6)
MAGNESIUM SERPL-MCNC: 2.3 MG/DL (ref 1.6–2.6)
MCH RBC QN AUTO: 25.9 PG (ref 25.2–33.5)
MCHC RBC AUTO-ENTMCNC: 28.5 G/DL (ref 28.4–34.8)
MCV RBC AUTO: 90.9 FL (ref 82.6–102.9)
MODE: ABNORMAL
MONOCYTES NFR BLD: 0.29 K/UL (ref 0.1–0.8)
MONOCYTES NFR BLD: 5 % (ref 1–7)
MORPHOLOGY: ABNORMAL
MORPHOLOGY: ABNORMAL
NEUTROPHILS NFR BLD: 86 % (ref 36–66)
NEUTS SEG NFR BLD: 4.98 K/UL (ref 1.8–7.7)
NRBC BLD-RTO: 0 PER 100 WBC
O2 DELIVERY DEVICE: ABNORMAL
PATIENT TEMP: 36.5
PLATELET # BLD AUTO: 142 K/UL (ref 138–453)
PMV BLD AUTO: 10.7 FL (ref 8.1–13.5)
POC HCO3: 36.8 MMOL/L (ref 21–28)
POC HCO3: 38.5 MMOL/L (ref 21–28)
POC O2 SATURATION: 97.3 % (ref 94–98)
POC O2 SATURATION: 98.6 % (ref 94–98)
POC PCO2: 50.6 MM HG (ref 35–48)
POC PCO2: 59.5 MM HG (ref 35–48)
POC PH: 7.42 (ref 7.35–7.45)
POC PH: 7.47 (ref 7.35–7.45)
POC PO2: 118.8 MM HG (ref 83–108)
POC PO2: 90.5 MM HG (ref 83–108)
POSITIVE BASE EXCESS, ART: 11.5 MMOL/L (ref 0–3)
POSITIVE BASE EXCESS, ART: 12 MMOL/L (ref 0–3)
POTASSIUM SERPL-SCNC: 3.2 MMOL/L (ref 3.7–5.3)
POTASSIUM SERPL-SCNC: 3.2 MMOL/L (ref 3.7–5.3)
POTASSIUM SERPL-SCNC: 3.3 MMOL/L (ref 3.7–5.3)
POTASSIUM SERPL-SCNC: 4.5 MMOL/L (ref 3.7–5.3)
PROT SERPL-MCNC: 6.1 G/DL (ref 6.4–8.3)
RBC # BLD AUTO: 3.74 M/UL (ref 4.21–5.77)
SAMPLE SITE: ABNORMAL
SODIUM SERPL-SCNC: 147 MMOL/L (ref 135–144)
SODIUM SERPL-SCNC: 147 MMOL/L (ref 135–144)
SODIUM SERPL-SCNC: 148 MMOL/L (ref 135–144)
SODIUM SERPL-SCNC: 150 MMOL/L (ref 135–144)
WBC OTHER # BLD: 5.8 K/UL (ref 3.5–11.3)

## 2023-10-27 PROCEDURE — 6360000002 HC RX W HCPCS: Performed by: STUDENT IN AN ORGANIZED HEALTH CARE EDUCATION/TRAINING PROGRAM

## 2023-10-27 PROCEDURE — 80048 BASIC METABOLIC PNL TOTAL CA: CPT

## 2023-10-27 PROCEDURE — 6370000000 HC RX 637 (ALT 250 FOR IP): Performed by: STUDENT IN AN ORGANIZED HEALTH CARE EDUCATION/TRAINING PROGRAM

## 2023-10-27 PROCEDURE — 36600 WITHDRAWAL OF ARTERIAL BLOOD: CPT

## 2023-10-27 PROCEDURE — 6360000002 HC RX W HCPCS

## 2023-10-27 PROCEDURE — 36415 COLL VENOUS BLD VENIPUNCTURE: CPT

## 2023-10-27 PROCEDURE — 2580000003 HC RX 258: Performed by: PEDIATRICS

## 2023-10-27 PROCEDURE — 99291 CRITICAL CARE FIRST HOUR: CPT | Performed by: INTERNAL MEDICINE

## 2023-10-27 PROCEDURE — 6370000000 HC RX 637 (ALT 250 FOR IP)

## 2023-10-27 PROCEDURE — 2500000003 HC RX 250 WO HCPCS: Performed by: STUDENT IN AN ORGANIZED HEALTH CARE EDUCATION/TRAINING PROGRAM

## 2023-10-27 PROCEDURE — 71045 X-RAY EXAM CHEST 1 VIEW: CPT

## 2023-10-27 PROCEDURE — 82803 BLOOD GASES ANY COMBINATION: CPT

## 2023-10-27 PROCEDURE — 80053 COMPREHEN METABOLIC PANEL: CPT

## 2023-10-27 PROCEDURE — 2500000003 HC RX 250 WO HCPCS

## 2023-10-27 PROCEDURE — 2580000003 HC RX 258: Performed by: STUDENT IN AN ORGANIZED HEALTH CARE EDUCATION/TRAINING PROGRAM

## 2023-10-27 PROCEDURE — 94003 VENT MGMT INPAT SUBQ DAY: CPT

## 2023-10-27 PROCEDURE — 94761 N-INVAS EAR/PLS OXIMETRY MLT: CPT

## 2023-10-27 PROCEDURE — 2000000000 HC ICU R&B

## 2023-10-27 PROCEDURE — 85025 COMPLETE CBC W/AUTO DIFF WBC: CPT

## 2023-10-27 PROCEDURE — 94660 CPAP INITIATION&MGMT: CPT

## 2023-10-27 PROCEDURE — 82947 ASSAY GLUCOSE BLOOD QUANT: CPT

## 2023-10-27 PROCEDURE — 2700000000 HC OXYGEN THERAPY PER DAY

## 2023-10-27 PROCEDURE — 83735 ASSAY OF MAGNESIUM: CPT

## 2023-10-27 RX ORDER — FUROSEMIDE 10 MG/ML
40 INJECTION INTRAMUSCULAR; INTRAVENOUS 2 TIMES DAILY
Status: DISCONTINUED | OUTPATIENT
Start: 2023-10-27 | End: 2023-10-29

## 2023-10-27 RX ORDER — DEXMEDETOMIDINE HYDROCHLORIDE 4 UG/ML
.1-1.5 INJECTION, SOLUTION INTRAVENOUS CONTINUOUS
Status: DISCONTINUED | OUTPATIENT
Start: 2023-10-27 | End: 2023-11-03

## 2023-10-27 RX ORDER — FUROSEMIDE 10 MG/ML
40 INJECTION INTRAMUSCULAR; INTRAVENOUS ONCE
Status: COMPLETED | OUTPATIENT
Start: 2023-10-27 | End: 2023-10-27

## 2023-10-27 RX ORDER — POTASSIUM CHLORIDE 29.8 MG/ML
20 INJECTION INTRAVENOUS
Status: COMPLETED | OUTPATIENT
Start: 2023-10-27 | End: 2023-10-27

## 2023-10-27 RX ORDER — MAGNESIUM SULFATE 1 G/100ML
1000 INJECTION INTRAVENOUS ONCE
Status: COMPLETED | OUTPATIENT
Start: 2023-10-27 | End: 2023-10-27

## 2023-10-27 RX ORDER — GINSENG 100 MG
CAPSULE ORAL 3 TIMES DAILY
Status: DISCONTINUED | OUTPATIENT
Start: 2023-10-27 | End: 2023-10-27 | Stop reason: SDUPTHER

## 2023-10-27 RX ADMIN — POTASSIUM BICARBONATE 40 MEQ: 782 TABLET, EFFERVESCENT ORAL at 05:11

## 2023-10-27 RX ADMIN — HEPARIN SODIUM 5000 UNITS: 5000 INJECTION INTRAVENOUS; SUBCUTANEOUS at 21:33

## 2023-10-27 RX ADMIN — SODIUM CHLORIDE, PRESERVATIVE FREE 10 ML: 5 INJECTION INTRAVENOUS at 21:34

## 2023-10-27 RX ADMIN — DOCUSATE SODIUM LIQUID 100 MG: 100 LIQUID ORAL at 08:40

## 2023-10-27 RX ADMIN — FUROSEMIDE 40 MG: 10 INJECTION, SOLUTION INTRAMUSCULAR; INTRAVENOUS at 14:05

## 2023-10-27 RX ADMIN — INSULIN LISPRO 4 UNITS: 100 INJECTION, SOLUTION INTRAVENOUS; SUBCUTANEOUS at 13:41

## 2023-10-27 RX ADMIN — FAMOTIDINE 20 MG: 20 TABLET, FILM COATED ORAL at 20:39

## 2023-10-27 RX ADMIN — MIDODRINE HYDROCHLORIDE 10 MG: 5 TABLET ORAL at 20:39

## 2023-10-27 RX ADMIN — MIDODRINE HYDROCHLORIDE 10 MG: 5 TABLET ORAL at 08:35

## 2023-10-27 RX ADMIN — AMIODARONE HYDROCHLORIDE 200 MG: 200 TABLET ORAL at 08:35

## 2023-10-27 RX ADMIN — DEXMEDETOMIDINE HYDROCHLORIDE 0.2 MCG/KG/HR: 400 INJECTION, SOLUTION INTRAVENOUS at 10:08

## 2023-10-27 RX ADMIN — SODIUM CHLORIDE, PRESERVATIVE FREE 10 ML: 5 INJECTION INTRAVENOUS at 14:07

## 2023-10-27 RX ADMIN — FUROSEMIDE 5 MG/HR: 10 INJECTION, SOLUTION INTRAMUSCULAR; INTRAVENOUS at 01:36

## 2023-10-27 RX ADMIN — POTASSIUM CHLORIDE 20 MEQ: 29.8 INJECTION, SOLUTION INTRAVENOUS at 13:32

## 2023-10-27 RX ADMIN — AMIODARONE HYDROCHLORIDE 200 MG: 200 TABLET ORAL at 20:39

## 2023-10-27 RX ADMIN — INSULIN LISPRO 3 UNITS: 100 INJECTION, SOLUTION INTRAVENOUS; SUBCUTANEOUS at 05:10

## 2023-10-27 RX ADMIN — DEXMEDETOMIDINE HYDROCHLORIDE 0.8 MCG/KG/HR: 400 INJECTION, SOLUTION INTRAVENOUS at 13:36

## 2023-10-27 RX ADMIN — SODIUM CHLORIDE 3000 MG: 900 INJECTION INTRAVENOUS at 08:33

## 2023-10-27 RX ADMIN — SODIUM CHLORIDE: 9 INJECTION, SOLUTION INTRAVENOUS at 10:58

## 2023-10-27 RX ADMIN — ASPIRIN 81 MG: 81 TABLET, CHEWABLE ORAL at 08:35

## 2023-10-27 RX ADMIN — SODIUM CHLORIDE 3000 MG: 900 INJECTION INTRAVENOUS at 01:25

## 2023-10-27 RX ADMIN — DEXMEDETOMIDINE HYDROCHLORIDE 0.4 MCG/KG/HR: 400 INJECTION, SOLUTION INTRAVENOUS at 18:23

## 2023-10-27 RX ADMIN — FUROSEMIDE 40 MG: 10 INJECTION, SOLUTION INTRAMUSCULAR; INTRAVENOUS at 18:17

## 2023-10-27 RX ADMIN — HEPARIN SODIUM 5000 UNITS: 5000 INJECTION INTRAVENOUS; SUBCUTANEOUS at 14:08

## 2023-10-27 RX ADMIN — HEPARIN SODIUM 5000 UNITS: 5000 INJECTION INTRAVENOUS; SUBCUTANEOUS at 05:32

## 2023-10-27 RX ADMIN — SODIUM CHLORIDE, PRESERVATIVE FREE 10 ML: 5 INJECTION INTRAVENOUS at 08:40

## 2023-10-27 RX ADMIN — SODIUM CHLORIDE, PRESERVATIVE FREE 10 ML: 5 INJECTION INTRAVENOUS at 20:39

## 2023-10-27 RX ADMIN — SODIUM CHLORIDE: 9 INJECTION, SOLUTION INTRAVENOUS at 10:52

## 2023-10-27 RX ADMIN — POTASSIUM CHLORIDE 20 MEQ: 29.8 INJECTION, SOLUTION INTRAVENOUS at 10:55

## 2023-10-27 RX ADMIN — SODIUM CHLORIDE, PRESERVATIVE FREE 10 ML: 5 INJECTION INTRAVENOUS at 08:39

## 2023-10-27 RX ADMIN — BACITRACIN: 500 OINTMENT TOPICAL at 16:59

## 2023-10-27 RX ADMIN — Medication 175 MCG/HR: at 05:25

## 2023-10-27 RX ADMIN — HYDROCORTISONE SODIUM SUCCINATE 100 MG: 100 INJECTION, POWDER, FOR SOLUTION INTRAMUSCULAR; INTRAVENOUS at 05:11

## 2023-10-27 RX ADMIN — SODIUM CHLORIDE 3000 MG: 900 INJECTION INTRAVENOUS at 13:39

## 2023-10-27 RX ADMIN — INSULIN LISPRO 4 UNITS: 100 INJECTION, SOLUTION INTRAVENOUS; SUBCUTANEOUS at 16:12

## 2023-10-27 RX ADMIN — FAMOTIDINE 20 MG: 20 TABLET, FILM COATED ORAL at 08:35

## 2023-10-27 RX ADMIN — SODIUM CHLORIDE, PRESERVATIVE FREE 10 ML: 5 INJECTION INTRAVENOUS at 08:42

## 2023-10-27 RX ADMIN — MIDODRINE HYDROCHLORIDE 10 MG: 5 TABLET ORAL at 01:19

## 2023-10-27 RX ADMIN — DEXMEDETOMIDINE HYDROCHLORIDE 0.8 MCG/KG/HR: 400 INJECTION, SOLUTION INTRAVENOUS at 15:19

## 2023-10-27 RX ADMIN — DOCUSATE SODIUM LIQUID 100 MG: 100 LIQUID ORAL at 20:39

## 2023-10-27 RX ADMIN — SODIUM CHLORIDE 3000 MG: 900 INJECTION INTRAVENOUS at 20:31

## 2023-10-27 RX ADMIN — MAGNESIUM SULFATE HEPTAHYDRATE 1000 MG: 1 INJECTION, SOLUTION INTRAVENOUS at 10:59

## 2023-10-27 ASSESSMENT — PULMONARY FUNCTION TESTS
PIF_VALUE: 17
PIF_VALUE: 36
PIF_VALUE: 16
PIF_VALUE: 17
PIF_VALUE: 14
PIF_VALUE: 30
PIF_VALUE: 15

## 2023-10-27 ASSESSMENT — PAIN SCALES - GENERAL
PAINLEVEL_OUTOF10: 0
PAINLEVEL_OUTOF10: 0

## 2023-10-28 LAB
ALBUMIN SERPL-MCNC: 2.8 G/DL (ref 3.5–5.2)
ALBUMIN/GLOB SERPL: 0.9 {RATIO} (ref 1–2.5)
ALP SERPL-CCNC: 99 U/L (ref 40–129)
ALT SERPL-CCNC: 16 U/L (ref 5–41)
ANION GAP SERPL CALCULATED.3IONS-SCNC: 10 MMOL/L (ref 9–17)
ANION GAP SERPL CALCULATED.3IONS-SCNC: 11 MMOL/L (ref 9–17)
ANION GAP SERPL CALCULATED.3IONS-SCNC: 14 MMOL/L (ref 9–17)
AST SERPL-CCNC: 30 U/L
BASOPHILS # BLD: 0.03 K/UL (ref 0–0.2)
BASOPHILS NFR BLD: 0 % (ref 0–2)
BILIRUB SERPL-MCNC: 0.5 MG/DL (ref 0.3–1.2)
BUN SERPL-MCNC: 34 MG/DL (ref 6–20)
BUN SERPL-MCNC: 34 MG/DL (ref 6–20)
BUN SERPL-MCNC: 35 MG/DL (ref 6–20)
CALCIUM SERPL-MCNC: 8.5 MG/DL (ref 8.6–10.4)
CALCIUM SERPL-MCNC: 8.6 MG/DL (ref 8.6–10.4)
CALCIUM SERPL-MCNC: 8.6 MG/DL (ref 8.6–10.4)
CHLORIDE SERPL-SCNC: 102 MMOL/L (ref 98–107)
CHLORIDE SERPL-SCNC: 103 MMOL/L (ref 98–107)
CHLORIDE SERPL-SCNC: 104 MMOL/L (ref 98–107)
CO2 SERPL-SCNC: 34 MMOL/L (ref 20–31)
CO2 SERPL-SCNC: 34 MMOL/L (ref 20–31)
CO2 SERPL-SCNC: 37 MMOL/L (ref 20–31)
CREAT SERPL-MCNC: 1.6 MG/DL (ref 0.7–1.2)
CREAT SERPL-MCNC: 1.6 MG/DL (ref 0.7–1.2)
CREAT SERPL-MCNC: 1.7 MG/DL (ref 0.7–1.2)
CRITICAL ACTION: NORMAL
CRITICAL NOTIFICATION DATE/TIME: NORMAL
CRITICAL NOTIFICATION: NORMAL
CRITICAL VALUE READ BACK: YES
EOSINOPHIL # BLD: 0.04 K/UL (ref 0–0.44)
EOSINOPHILS RELATIVE PERCENT: 1 % (ref 1–4)
ERYTHROCYTE [DISTWIDTH] IN BLOOD BY AUTOMATED COUNT: 17.2 % (ref 11.8–14.4)
FIO2: 40
GFR SERPL CREATININE-BSD FRML MDRD: 46 ML/MIN/1.73M2
GFR SERPL CREATININE-BSD FRML MDRD: 49 ML/MIN/1.73M2
GFR SERPL CREATININE-BSD FRML MDRD: 49 ML/MIN/1.73M2
GLUCOSE BLD-MCNC: 146 MG/DL (ref 75–110)
GLUCOSE BLD-MCNC: 158 MG/DL (ref 75–110)
GLUCOSE BLD-MCNC: 162 MG/DL (ref 75–110)
GLUCOSE BLD-MCNC: 163 MG/DL (ref 75–110)
GLUCOSE BLD-MCNC: 173 MG/DL (ref 75–110)
GLUCOSE BLD-MCNC: 181 MG/DL (ref 74–100)
GLUCOSE SERPL-MCNC: 183 MG/DL (ref 70–99)
GLUCOSE SERPL-MCNC: 194 MG/DL (ref 70–99)
GLUCOSE SERPL-MCNC: 200 MG/DL (ref 70–99)
HCT VFR BLD AUTO: 34.9 % (ref 40.7–50.3)
HGB BLD-MCNC: 9.7 G/DL (ref 13–17)
IMM GRANULOCYTES # BLD AUTO: 0.32 K/UL (ref 0–0.3)
IMM GRANULOCYTES NFR BLD: 5 %
LYMPHOCYTES NFR BLD: 0.8 K/UL (ref 1.1–3.7)
LYMPHOCYTES RELATIVE PERCENT: 12 % (ref 24–43)
MAGNESIUM SERPL-MCNC: 2.1 MG/DL (ref 1.6–2.6)
MAGNESIUM SERPL-MCNC: 2.3 MG/DL (ref 1.6–2.6)
MAGNESIUM SERPL-MCNC: 2.4 MG/DL (ref 1.6–2.6)
MCH RBC QN AUTO: 25.8 PG (ref 25.2–33.5)
MCHC RBC AUTO-ENTMCNC: 28.2 G/DL (ref 28.4–34.8)
MCV RBC AUTO: 92.8 FL (ref 82.6–102.9)
MONOCYTES NFR BLD: 0.32 K/UL (ref 0.1–1.2)
MONOCYTES NFR BLD: 5 % (ref 3–12)
NEUTROPHILS NFR BLD: 78 % (ref 36–65)
NEUTS SEG NFR BLD: 5.25 K/UL (ref 1.5–8.1)
NRBC BLD-RTO: 0 PER 100 WBC
O2 DELIVERY DEVICE: ABNORMAL
PLATELET # BLD AUTO: ABNORMAL K/UL (ref 138–453)
PLATELET, FLUORESCENCE: 177 K/UL (ref 138–453)
PLATELETS.RETICULATED NFR BLD AUTO: 3.3 % (ref 1.1–10.3)
POC HCO3: 40.7 MMOL/L (ref 21–28)
POC O2 SATURATION: 96.3 % (ref 94–98)
POC PCO2: 50.7 MM HG (ref 35–48)
POC PH: 7.51 (ref 7.35–7.45)
POC PO2: 77.7 MM HG (ref 83–108)
POSITIVE BASE EXCESS, ART: 15.5 MMOL/L (ref 0–3)
POTASSIUM SERPL-SCNC: 3.1 MMOL/L (ref 3.7–5.3)
POTASSIUM SERPL-SCNC: 3.2 MMOL/L (ref 3.7–5.3)
POTASSIUM SERPL-SCNC: 4.2 MMOL/L (ref 3.7–5.3)
PROT SERPL-MCNC: 6 G/DL (ref 6.4–8.3)
RBC # BLD AUTO: 3.76 M/UL (ref 4.21–5.77)
SODIUM SERPL-SCNC: 148 MMOL/L (ref 135–144)
SODIUM SERPL-SCNC: 150 MMOL/L (ref 135–144)
SODIUM SERPL-SCNC: 151 MMOL/L (ref 135–144)
WBC OTHER # BLD: 6.8 K/UL (ref 3.5–11.3)

## 2023-10-28 PROCEDURE — 94761 N-INVAS EAR/PLS OXIMETRY MLT: CPT

## 2023-10-28 PROCEDURE — 36415 COLL VENOUS BLD VENIPUNCTURE: CPT

## 2023-10-28 PROCEDURE — 6360000002 HC RX W HCPCS

## 2023-10-28 PROCEDURE — 80048 BASIC METABOLIC PNL TOTAL CA: CPT

## 2023-10-28 PROCEDURE — 82803 BLOOD GASES ANY COMBINATION: CPT

## 2023-10-28 PROCEDURE — 85025 COMPLETE CBC W/AUTO DIFF WBC: CPT

## 2023-10-28 PROCEDURE — 6370000000 HC RX 637 (ALT 250 FOR IP)

## 2023-10-28 PROCEDURE — 6370000000 HC RX 637 (ALT 250 FOR IP): Performed by: STUDENT IN AN ORGANIZED HEALTH CARE EDUCATION/TRAINING PROGRAM

## 2023-10-28 PROCEDURE — 80053 COMPREHEN METABOLIC PANEL: CPT

## 2023-10-28 PROCEDURE — 2700000000 HC OXYGEN THERAPY PER DAY

## 2023-10-28 PROCEDURE — 2580000003 HC RX 258: Performed by: STUDENT IN AN ORGANIZED HEALTH CARE EDUCATION/TRAINING PROGRAM

## 2023-10-28 PROCEDURE — 94660 CPAP INITIATION&MGMT: CPT

## 2023-10-28 PROCEDURE — 36600 WITHDRAWAL OF ARTERIAL BLOOD: CPT

## 2023-10-28 PROCEDURE — 85055 RETICULATED PLATELET ASSAY: CPT

## 2023-10-28 PROCEDURE — 6360000002 HC RX W HCPCS: Performed by: STUDENT IN AN ORGANIZED HEALTH CARE EDUCATION/TRAINING PROGRAM

## 2023-10-28 PROCEDURE — 2500000003 HC RX 250 WO HCPCS

## 2023-10-28 PROCEDURE — 2000000000 HC ICU R&B

## 2023-10-28 PROCEDURE — 83735 ASSAY OF MAGNESIUM: CPT

## 2023-10-28 PROCEDURE — 2580000003 HC RX 258: Performed by: PEDIATRICS

## 2023-10-28 PROCEDURE — 99291 CRITICAL CARE FIRST HOUR: CPT | Performed by: INTERNAL MEDICINE

## 2023-10-28 RX ORDER — CHLOROTHIAZIDE SODIUM 500 MG/1
250 INJECTION INTRAVENOUS ONCE
Status: COMPLETED | OUTPATIENT
Start: 2023-10-28 | End: 2023-10-28

## 2023-10-28 RX ORDER — POTASSIUM CHLORIDE 29.8 MG/ML
20 INJECTION INTRAVENOUS
Status: COMPLETED | OUTPATIENT
Start: 2023-10-28 | End: 2023-10-28

## 2023-10-28 RX ORDER — FENTANYL CITRATE 50 UG/ML
50 INJECTION, SOLUTION INTRAMUSCULAR; INTRAVENOUS ONCE
Status: COMPLETED | OUTPATIENT
Start: 2023-10-28 | End: 2023-10-28

## 2023-10-28 RX ORDER — POTASSIUM CHLORIDE 29.8 MG/ML
20 INJECTION INTRAVENOUS PRN
Status: DISCONTINUED | OUTPATIENT
Start: 2023-10-28 | End: 2023-11-09 | Stop reason: HOSPADM

## 2023-10-28 RX ORDER — FENTANYL CITRATE 50 UG/ML
50 INJECTION, SOLUTION INTRAMUSCULAR; INTRAVENOUS
Status: DISCONTINUED | OUTPATIENT
Start: 2023-10-28 | End: 2023-10-30

## 2023-10-28 RX ADMIN — POTASSIUM CHLORIDE 20 MEQ: 29.8 INJECTION, SOLUTION INTRAVENOUS at 08:51

## 2023-10-28 RX ADMIN — SODIUM CHLORIDE, PRESERVATIVE FREE 10 ML: 5 INJECTION INTRAVENOUS at 19:59

## 2023-10-28 RX ADMIN — HEPARIN SODIUM 5000 UNITS: 5000 INJECTION INTRAVENOUS; SUBCUTANEOUS at 22:00

## 2023-10-28 RX ADMIN — DEXMEDETOMIDINE HYDROCHLORIDE 0.4 MCG/KG/HR: 400 INJECTION, SOLUTION INTRAVENOUS at 08:13

## 2023-10-28 RX ADMIN — DEXMEDETOMIDINE HYDROCHLORIDE 0.8 MCG/KG/HR: 400 INJECTION, SOLUTION INTRAVENOUS at 22:11

## 2023-10-28 RX ADMIN — FENTANYL CITRATE 50 MCG: 50 INJECTION, SOLUTION INTRAMUSCULAR; INTRAVENOUS at 18:15

## 2023-10-28 RX ADMIN — DEXMEDETOMIDINE HYDROCHLORIDE 1.2 MCG/KG/HR: 400 INJECTION, SOLUTION INTRAVENOUS at 03:42

## 2023-10-28 RX ADMIN — POTASSIUM CHLORIDE 20 MEQ: 29.8 INJECTION, SOLUTION INTRAVENOUS at 09:40

## 2023-10-28 RX ADMIN — SODIUM CHLORIDE 3000 MG: 900 INJECTION INTRAVENOUS at 08:11

## 2023-10-28 RX ADMIN — POTASSIUM CHLORIDE 20 MEQ: 29.8 INJECTION, SOLUTION INTRAVENOUS at 11:53

## 2023-10-28 RX ADMIN — POTASSIUM CHLORIDE 20 MEQ: 29.8 INJECTION, SOLUTION INTRAVENOUS at 10:38

## 2023-10-28 RX ADMIN — SODIUM CHLORIDE, PRESERVATIVE FREE 10 ML: 5 INJECTION INTRAVENOUS at 21:01

## 2023-10-28 RX ADMIN — SODIUM CHLORIDE 3000 MG: 900 INJECTION INTRAVENOUS at 01:23

## 2023-10-28 RX ADMIN — DEXMEDETOMIDINE HYDROCHLORIDE 1.2 MCG/KG/HR: 400 INJECTION, SOLUTION INTRAVENOUS at 00:02

## 2023-10-28 RX ADMIN — SODIUM CHLORIDE, PRESERVATIVE FREE 10 ML: 5 INJECTION INTRAVENOUS at 08:15

## 2023-10-28 RX ADMIN — FENTANYL CITRATE 50 MCG: 50 INJECTION, SOLUTION INTRAMUSCULAR; INTRAVENOUS at 19:59

## 2023-10-28 RX ADMIN — HYDROCORTISONE SODIUM SUCCINATE 25 MG: 100 INJECTION, POWDER, FOR SOLUTION INTRAMUSCULAR; INTRAVENOUS at 09:42

## 2023-10-28 RX ADMIN — FUROSEMIDE 40 MG: 10 INJECTION, SOLUTION INTRAMUSCULAR; INTRAVENOUS at 08:09

## 2023-10-28 RX ADMIN — HEPARIN SODIUM 5000 UNITS: 5000 INJECTION INTRAVENOUS; SUBCUTANEOUS at 05:30

## 2023-10-28 RX ADMIN — HEPARIN SODIUM 5000 UNITS: 5000 INJECTION INTRAVENOUS; SUBCUTANEOUS at 13:18

## 2023-10-28 RX ADMIN — MIDODRINE HYDROCHLORIDE 10 MG: 5 TABLET ORAL at 01:23

## 2023-10-28 RX ADMIN — FENTANYL CITRATE 50 MCG: 50 INJECTION, SOLUTION INTRAMUSCULAR; INTRAVENOUS at 21:01

## 2023-10-28 RX ADMIN — FENTANYL CITRATE 50 MCG: 50 INJECTION, SOLUTION INTRAMUSCULAR; INTRAVENOUS at 22:01

## 2023-10-28 RX ADMIN — SODIUM CHLORIDE 3000 MG: 900 INJECTION INTRAVENOUS at 19:14

## 2023-10-28 RX ADMIN — SODIUM CHLORIDE 3000 MG: 900 INJECTION INTRAVENOUS at 13:13

## 2023-10-28 RX ADMIN — SODIUM CHLORIDE: 9 INJECTION, SOLUTION INTRAVENOUS at 06:15

## 2023-10-28 RX ADMIN — DEXMEDETOMIDINE HYDROCHLORIDE 1.2 MCG/KG/HR: 400 INJECTION, SOLUTION INTRAVENOUS at 01:53

## 2023-10-28 RX ADMIN — POTASSIUM BICARBONATE 40 MEQ: 782 TABLET, EFFERVESCENT ORAL at 04:42

## 2023-10-28 RX ADMIN — CHLOROTHIAZIDE SODIUM 250 MG: 500 INJECTION, POWDER, LYOPHILIZED, FOR SOLUTION INTRAVENOUS at 15:04

## 2023-10-28 RX ADMIN — HYDROCORTISONE SODIUM SUCCINATE 25 MG: 100 INJECTION, POWDER, FOR SOLUTION INTRAMUSCULAR; INTRAVENOUS at 22:45

## 2023-10-28 RX ADMIN — DEXMEDETOMIDINE HYDROCHLORIDE 1.2 MCG/KG/HR: 400 INJECTION, SOLUTION INTRAVENOUS at 05:30

## 2023-10-28 RX ADMIN — FUROSEMIDE 40 MG: 10 INJECTION, SOLUTION INTRAMUSCULAR; INTRAVENOUS at 18:15

## 2023-10-28 ASSESSMENT — PULMONARY FUNCTION TESTS
PIF_VALUE: 17
PIF_VALUE: 14
PIF_VALUE: 15
PIF_VALUE: 15

## 2023-10-29 ENCOUNTER — APPOINTMENT (OUTPATIENT)
Dept: GENERAL RADIOLOGY | Age: 58
DRG: 004 | End: 2023-10-29
Payer: MEDICARE

## 2023-10-29 LAB
ALLEN TEST: POSITIVE
ANION GAP SERPL CALCULATED.3IONS-SCNC: 11 MMOL/L (ref 9–17)
ANION GAP SERPL CALCULATED.3IONS-SCNC: 9 MMOL/L (ref 9–17)
BASOPHILS # BLD: 0 K/UL (ref 0–0.2)
BASOPHILS NFR BLD: 0 % (ref 0–2)
BUN SERPL-MCNC: 34 MG/DL (ref 6–20)
BUN SERPL-MCNC: 35 MG/DL (ref 6–20)
CALCIUM SERPL-MCNC: 8.5 MG/DL (ref 8.6–10.4)
CALCIUM SERPL-MCNC: 8.7 MG/DL (ref 8.6–10.4)
CHLORIDE SERPL-SCNC: 104 MMOL/L (ref 98–107)
CHLORIDE SERPL-SCNC: 105 MMOL/L (ref 98–107)
CO2 SERPL-SCNC: 40 MMOL/L (ref 20–31)
CO2 SERPL-SCNC: 40 MMOL/L (ref 20–31)
CREAT SERPL-MCNC: 1.6 MG/DL (ref 0.7–1.2)
CREAT SERPL-MCNC: 1.6 MG/DL (ref 0.7–1.2)
EOSINOPHIL # BLD: 0.06 K/UL (ref 0–0.4)
EOSINOPHILS RELATIVE PERCENT: 1 % (ref 1–4)
ERYTHROCYTE [DISTWIDTH] IN BLOOD BY AUTOMATED COUNT: 17.3 % (ref 11.8–14.4)
FIO2: 45
GFR SERPL CREATININE-BSD FRML MDRD: 49 ML/MIN/1.73M2
GFR SERPL CREATININE-BSD FRML MDRD: 49 ML/MIN/1.73M2
GLUCOSE BLD-MCNC: 150 MG/DL (ref 75–110)
GLUCOSE BLD-MCNC: 175 MG/DL (ref 74–100)
GLUCOSE BLD-MCNC: 202 MG/DL (ref 75–110)
GLUCOSE BLD-MCNC: 222 MG/DL (ref 75–110)
GLUCOSE SERPL-MCNC: 178 MG/DL (ref 70–99)
GLUCOSE SERPL-MCNC: 236 MG/DL (ref 70–99)
HCT VFR BLD AUTO: 36.2 % (ref 40.7–50.3)
HGB BLD-MCNC: 10.2 G/DL (ref 13–17)
IMM GRANULOCYTES # BLD AUTO: 0.23 K/UL (ref 0–0.3)
IMM GRANULOCYTES NFR BLD: 4 %
LYMPHOCYTES NFR BLD: 0.51 K/UL (ref 1–4.8)
LYMPHOCYTES RELATIVE PERCENT: 9 % (ref 24–44)
MAGNESIUM SERPL-MCNC: 2.1 MG/DL (ref 1.6–2.6)
MAGNESIUM SERPL-MCNC: 2.1 MG/DL (ref 1.6–2.6)
MCH RBC QN AUTO: 26 PG (ref 25.2–33.5)
MCHC RBC AUTO-ENTMCNC: 28.2 G/DL (ref 28.4–34.8)
MCV RBC AUTO: 92.3 FL (ref 82.6–102.9)
MODE: ABNORMAL
MONOCYTES NFR BLD: 0.46 K/UL (ref 0.1–0.8)
MONOCYTES NFR BLD: 8 % (ref 1–7)
MORPHOLOGY: ABNORMAL
MORPHOLOGY: ABNORMAL
NEUTROPHILS NFR BLD: 78 % (ref 36–66)
NEUTS SEG NFR BLD: 4.44 K/UL (ref 1.8–7.7)
NRBC BLD-RTO: 0 PER 100 WBC
PLATELET # BLD AUTO: 122 K/UL (ref 138–453)
PMV BLD AUTO: 11.7 FL (ref 8.1–13.5)
POC HCO3: 43.2 MMOL/L (ref 21–28)
POC O2 SATURATION: 92.2 % (ref 94–98)
POC PCO2: 59.2 MM HG (ref 35–48)
POC PH: 7.47 (ref 7.35–7.45)
POC PO2: 62.8 MM HG (ref 83–108)
POSITIVE BASE EXCESS, ART: 16.8 MMOL/L (ref 0–3)
POTASSIUM SERPL-SCNC: 3.2 MMOL/L (ref 3.7–5.3)
POTASSIUM SERPL-SCNC: 3.2 MMOL/L (ref 3.7–5.3)
RBC # BLD AUTO: 3.92 M/UL (ref 4.21–5.77)
SAMPLE SITE: ABNORMAL
SODIUM SERPL-SCNC: 154 MMOL/L (ref 135–144)
SODIUM SERPL-SCNC: 155 MMOL/L (ref 135–144)
WBC OTHER # BLD: 5.7 K/UL (ref 3.5–11.3)

## 2023-10-29 PROCEDURE — 2580000003 HC RX 258: Performed by: PEDIATRICS

## 2023-10-29 PROCEDURE — 71045 X-RAY EXAM CHEST 1 VIEW: CPT

## 2023-10-29 PROCEDURE — 85025 COMPLETE CBC W/AUTO DIFF WBC: CPT

## 2023-10-29 PROCEDURE — 2580000003 HC RX 258: Performed by: STUDENT IN AN ORGANIZED HEALTH CARE EDUCATION/TRAINING PROGRAM

## 2023-10-29 PROCEDURE — 2000000000 HC ICU R&B

## 2023-10-29 PROCEDURE — 99291 CRITICAL CARE FIRST HOUR: CPT | Performed by: INTERNAL MEDICINE

## 2023-10-29 PROCEDURE — 6370000000 HC RX 637 (ALT 250 FOR IP)

## 2023-10-29 PROCEDURE — 94660 CPAP INITIATION&MGMT: CPT

## 2023-10-29 PROCEDURE — 82947 ASSAY GLUCOSE BLOOD QUANT: CPT

## 2023-10-29 PROCEDURE — 94761 N-INVAS EAR/PLS OXIMETRY MLT: CPT

## 2023-10-29 PROCEDURE — 6360000002 HC RX W HCPCS

## 2023-10-29 PROCEDURE — 83735 ASSAY OF MAGNESIUM: CPT

## 2023-10-29 PROCEDURE — 6360000002 HC RX W HCPCS: Performed by: STUDENT IN AN ORGANIZED HEALTH CARE EDUCATION/TRAINING PROGRAM

## 2023-10-29 PROCEDURE — 36415 COLL VENOUS BLD VENIPUNCTURE: CPT

## 2023-10-29 PROCEDURE — 2700000000 HC OXYGEN THERAPY PER DAY

## 2023-10-29 PROCEDURE — 80048 BASIC METABOLIC PNL TOTAL CA: CPT

## 2023-10-29 PROCEDURE — 82803 BLOOD GASES ANY COMBINATION: CPT

## 2023-10-29 PROCEDURE — 2580000003 HC RX 258

## 2023-10-29 PROCEDURE — 2500000003 HC RX 250 WO HCPCS

## 2023-10-29 PROCEDURE — 36600 WITHDRAWAL OF ARTERIAL BLOOD: CPT

## 2023-10-29 RX ORDER — ACETAZOLAMIDE 500 MG/5ML
250 INJECTION, POWDER, LYOPHILIZED, FOR SOLUTION INTRAVENOUS ONCE
Status: COMPLETED | OUTPATIENT
Start: 2023-10-29 | End: 2023-10-29

## 2023-10-29 RX ORDER — POTASSIUM CHLORIDE 29.8 MG/ML
20 INJECTION INTRAVENOUS
Status: DISPENSED | OUTPATIENT
Start: 2023-10-29 | End: 2023-10-29

## 2023-10-29 RX ADMIN — FUROSEMIDE 40 MG: 10 INJECTION, SOLUTION INTRAMUSCULAR; INTRAVENOUS at 09:22

## 2023-10-29 RX ADMIN — DEXMEDETOMIDINE HYDROCHLORIDE 1.5 MCG/KG/HR: 400 INJECTION, SOLUTION INTRAVENOUS at 03:17

## 2023-10-29 RX ADMIN — SODIUM CHLORIDE 3000 MG: 900 INJECTION INTRAVENOUS at 19:44

## 2023-10-29 RX ADMIN — DEXMEDETOMIDINE HYDROCHLORIDE 1.2 MCG/KG/HR: 400 INJECTION, SOLUTION INTRAVENOUS at 00:29

## 2023-10-29 RX ADMIN — SODIUM CHLORIDE, PRESERVATIVE FREE 10 ML: 5 INJECTION INTRAVENOUS at 12:27

## 2023-10-29 RX ADMIN — INSULIN LISPRO 2 UNITS: 100 INJECTION, SOLUTION INTRAVENOUS; SUBCUTANEOUS at 06:16

## 2023-10-29 RX ADMIN — DEXMEDETOMIDINE HYDROCHLORIDE 0.8 MCG/KG/HR: 400 INJECTION, SOLUTION INTRAVENOUS at 19:47

## 2023-10-29 RX ADMIN — SODIUM CHLORIDE, PRESERVATIVE FREE 10 ML: 5 INJECTION INTRAVENOUS at 09:23

## 2023-10-29 RX ADMIN — INSULIN LISPRO 2 UNITS: 100 INJECTION, SOLUTION INTRAVENOUS; SUBCUTANEOUS at 09:48

## 2023-10-29 RX ADMIN — FENTANYL CITRATE 50 MCG: 50 INJECTION, SOLUTION INTRAMUSCULAR; INTRAVENOUS at 21:16

## 2023-10-29 RX ADMIN — SODIUM CHLORIDE 3000 MG: 900 INJECTION INTRAVENOUS at 01:47

## 2023-10-29 RX ADMIN — SODIUM CHLORIDE, PRESERVATIVE FREE 10 ML: 5 INJECTION INTRAVENOUS at 16:54

## 2023-10-29 RX ADMIN — HYDROCORTISONE SODIUM SUCCINATE 25 MG: 100 INJECTION, POWDER, FOR SOLUTION INTRAMUSCULAR; INTRAVENOUS at 23:28

## 2023-10-29 RX ADMIN — SODIUM CHLORIDE: 9 INJECTION, SOLUTION INTRAVENOUS at 13:11

## 2023-10-29 RX ADMIN — DEXMEDETOMIDINE HYDROCHLORIDE 1.3 MCG/KG/HR: 400 INJECTION, SOLUTION INTRAVENOUS at 06:16

## 2023-10-29 RX ADMIN — HEPARIN SODIUM 5000 UNITS: 5000 INJECTION INTRAVENOUS; SUBCUTANEOUS at 05:57

## 2023-10-29 RX ADMIN — DEXMEDETOMIDINE HYDROCHLORIDE 1.5 MCG/KG/HR: 400 INJECTION, SOLUTION INTRAVENOUS at 04:39

## 2023-10-29 RX ADMIN — SODIUM CHLORIDE, PRESERVATIVE FREE 10 ML: 5 INJECTION INTRAVENOUS at 09:22

## 2023-10-29 RX ADMIN — SODIUM CHLORIDE 3000 MG: 900 INJECTION INTRAVENOUS at 13:14

## 2023-10-29 RX ADMIN — POTASSIUM CHLORIDE 20 MEQ: 29.8 INJECTION, SOLUTION INTRAVENOUS at 12:24

## 2023-10-29 RX ADMIN — HEPARIN SODIUM 5000 UNITS: 5000 INJECTION INTRAVENOUS; SUBCUTANEOUS at 22:01

## 2023-10-29 RX ADMIN — DEXMEDETOMIDINE HYDROCHLORIDE 0.8 MCG/KG/HR: 400 INJECTION, SOLUTION INTRAVENOUS at 08:16

## 2023-10-29 RX ADMIN — POTASSIUM CHLORIDE 50 ML/HR: 2 INJECTION, SOLUTION, CONCENTRATE INTRAVENOUS at 17:26

## 2023-10-29 RX ADMIN — FENTANYL CITRATE 50 MCG: 50 INJECTION, SOLUTION INTRAMUSCULAR; INTRAVENOUS at 00:33

## 2023-10-29 RX ADMIN — DEXMEDETOMIDINE HYDROCHLORIDE 1.5 MCG/KG/HR: 400 INJECTION, SOLUTION INTRAVENOUS at 23:30

## 2023-10-29 RX ADMIN — SODIUM CHLORIDE, PRESERVATIVE FREE 10 ML: 5 INJECTION INTRAVENOUS at 21:16

## 2023-10-29 RX ADMIN — FENTANYL CITRATE 50 MCG: 50 INJECTION, SOLUTION INTRAMUSCULAR; INTRAVENOUS at 01:45

## 2023-10-29 RX ADMIN — POTASSIUM CHLORIDE 20 MEQ: 29.8 INJECTION, SOLUTION INTRAVENOUS at 16:23

## 2023-10-29 RX ADMIN — FENTANYL CITRATE 50 MCG: 50 INJECTION, SOLUTION INTRAMUSCULAR; INTRAVENOUS at 23:48

## 2023-10-29 RX ADMIN — HYDROCORTISONE SODIUM SUCCINATE 25 MG: 100 INJECTION, POWDER, FOR SOLUTION INTRAMUSCULAR; INTRAVENOUS at 12:26

## 2023-10-29 RX ADMIN — POTASSIUM CHLORIDE 20 MEQ: 29.8 INJECTION, SOLUTION INTRAVENOUS at 09:37

## 2023-10-29 RX ADMIN — HEPARIN SODIUM 5000 UNITS: 5000 INJECTION INTRAVENOUS; SUBCUTANEOUS at 13:44

## 2023-10-29 RX ADMIN — ACETAZOLAMIDE 250 MG: 500 INJECTION, POWDER, LYOPHILIZED, FOR SOLUTION INTRAVENOUS at 16:51

## 2023-10-29 RX ADMIN — SODIUM CHLORIDE, PRESERVATIVE FREE 10 ML: 5 INJECTION INTRAVENOUS at 22:01

## 2023-10-29 RX ADMIN — SODIUM CHLORIDE: 9 INJECTION, SOLUTION INTRAVENOUS at 06:15

## 2023-10-29 RX ADMIN — DEXMEDETOMIDINE HYDROCHLORIDE 1.4 MCG/KG/HR: 400 INJECTION, SOLUTION INTRAVENOUS at 22:02

## 2023-10-29 RX ADMIN — DEXMEDETOMIDINE HYDROCHLORIDE 0.4 MCG/KG/HR: 400 INJECTION, SOLUTION INTRAVENOUS at 11:58

## 2023-10-29 RX ADMIN — DEXMEDETOMIDINE HYDROCHLORIDE 1.4 MCG/KG/HR: 400 INJECTION, SOLUTION INTRAVENOUS at 01:49

## 2023-10-29 RX ADMIN — POTASSIUM CHLORIDE 20 MEQ: 29.8 INJECTION, SOLUTION INTRAVENOUS at 17:31

## 2023-10-29 RX ADMIN — POTASSIUM CHLORIDE 20 MEQ: 29.8 INJECTION, SOLUTION INTRAVENOUS at 13:44

## 2023-10-29 RX ADMIN — SODIUM CHLORIDE 3000 MG: 900 INJECTION INTRAVENOUS at 07:42

## 2023-10-29 ASSESSMENT — PULMONARY FUNCTION TESTS
PIF_VALUE: 16
PIF_VALUE: 18
PIF_VALUE: 16
PIF_VALUE: 3
PIF_VALUE: 17

## 2023-10-30 LAB
ALBUMIN SERPL-MCNC: 2.9 G/DL (ref 3.5–5.2)
ALBUMIN SERPL-MCNC: 2.9 G/DL (ref 3.5–5.2)
ALBUMIN/GLOB SERPL: 1 {RATIO} (ref 1–2.5)
ALBUMIN/GLOB SERPL: 1 {RATIO} (ref 1–2.5)
ALLEN TEST: POSITIVE
ALP SERPL-CCNC: 92 U/L (ref 40–129)
ALP SERPL-CCNC: 94 U/L (ref 40–129)
ALT SERPL-CCNC: 21 U/L (ref 5–41)
ALT SERPL-CCNC: 23 U/L (ref 5–41)
ANION GAP SERPL CALCULATED.3IONS-SCNC: 13 MMOL/L (ref 9–17)
ANION GAP SERPL CALCULATED.3IONS-SCNC: 9 MMOL/L (ref 9–17)
ANION GAP SERPL CALCULATED.3IONS-SCNC: 9 MMOL/L (ref 9–17)
AST SERPL-CCNC: 30 U/L
AST SERPL-CCNC: 31 U/L
BASOPHILS # BLD: 0 K/UL (ref 0–0.2)
BASOPHILS NFR BLD: 0 % (ref 0–2)
BILIRUB SERPL-MCNC: 0.7 MG/DL (ref 0.3–1.2)
BILIRUB SERPL-MCNC: 0.7 MG/DL (ref 0.3–1.2)
BUN SERPL-MCNC: 33 MG/DL (ref 6–20)
CALCIUM SERPL-MCNC: 8.3 MG/DL (ref 8.6–10.4)
CALCIUM SERPL-MCNC: 8.4 MG/DL (ref 8.6–10.4)
CALCIUM SERPL-MCNC: 8.5 MG/DL (ref 8.6–10.4)
CHLORIDE SERPL-SCNC: 103 MMOL/L (ref 98–107)
CHLORIDE SERPL-SCNC: 104 MMOL/L (ref 98–107)
CHLORIDE SERPL-SCNC: 107 MMOL/L (ref 98–107)
CO2 SERPL-SCNC: 34 MMOL/L (ref 20–31)
CO2 SERPL-SCNC: 36 MMOL/L (ref 20–31)
CO2 SERPL-SCNC: 39 MMOL/L (ref 20–31)
CREAT SERPL-MCNC: 1.6 MG/DL (ref 0.7–1.2)
CREAT SERPL-MCNC: 1.6 MG/DL (ref 0.7–1.2)
CREAT SERPL-MCNC: 1.7 MG/DL (ref 0.7–1.2)
EOSINOPHIL # BLD: 0 K/UL (ref 0–0.4)
EOSINOPHILS RELATIVE PERCENT: 0 % (ref 1–4)
ERYTHROCYTE [DISTWIDTH] IN BLOOD BY AUTOMATED COUNT: 17.3 % (ref 11.8–14.4)
FIO2: 40
GFR SERPL CREATININE-BSD FRML MDRD: 46 ML/MIN/1.73M2
GFR SERPL CREATININE-BSD FRML MDRD: 49 ML/MIN/1.73M2
GFR SERPL CREATININE-BSD FRML MDRD: 49 ML/MIN/1.73M2
GLUCOSE BLD-MCNC: 162 MG/DL (ref 75–110)
GLUCOSE BLD-MCNC: 175 MG/DL (ref 75–110)
GLUCOSE BLD-MCNC: 189 MG/DL (ref 75–110)
GLUCOSE SERPL-MCNC: 191 MG/DL (ref 70–99)
GLUCOSE SERPL-MCNC: 196 MG/DL (ref 70–99)
GLUCOSE SERPL-MCNC: 196 MG/DL (ref 70–99)
HCT VFR BLD AUTO: 35.5 % (ref 40.7–50.3)
HGB BLD-MCNC: 10.1 G/DL (ref 13–17)
IMM GRANULOCYTES # BLD AUTO: 0.06 K/UL (ref 0–0.3)
IMM GRANULOCYTES NFR BLD: 1 %
LYMPHOCYTES NFR BLD: 0.42 K/UL (ref 1–4.8)
LYMPHOCYTES RELATIVE PERCENT: 7 % (ref 24–44)
MAGNESIUM SERPL-MCNC: 2.1 MG/DL (ref 1.6–2.6)
MCH RBC QN AUTO: 25.7 PG (ref 25.2–33.5)
MCHC RBC AUTO-ENTMCNC: 28.5 G/DL (ref 28.4–34.8)
MCV RBC AUTO: 90.3 FL (ref 82.6–102.9)
MONOCYTES NFR BLD: 0.12 K/UL (ref 0.1–0.8)
MONOCYTES NFR BLD: 2 % (ref 1–7)
MORPHOLOGY: ABNORMAL
MORPHOLOGY: ABNORMAL
NEUTROPHILS NFR BLD: 90 % (ref 36–66)
NEUTS SEG NFR BLD: 5.4 K/UL (ref 1.8–7.7)
NRBC BLD-RTO: 0 PER 100 WBC
PLATELET # BLD AUTO: 115 K/UL (ref 138–453)
PMV BLD AUTO: 12 FL (ref 8.1–13.5)
POC HCO3: 37.7 MMOL/L (ref 21–28)
POC O2 SATURATION: 96.7 % (ref 94–98)
POC PCO2: 61.5 MM HG (ref 35–48)
POC PH: 7.39 (ref 7.35–7.45)
POC PO2: 91.3 MM HG (ref 83–108)
POSITIVE BASE EXCESS, ART: 10.8 MMOL/L (ref 0–3)
POTASSIUM SERPL-SCNC: 3.4 MMOL/L (ref 3.7–5.3)
POTASSIUM SERPL-SCNC: 3.6 MMOL/L (ref 3.7–5.3)
POTASSIUM SERPL-SCNC: 4 MMOL/L (ref 3.7–5.3)
PROT SERPL-MCNC: 5.8 G/DL (ref 6.4–8.3)
PROT SERPL-MCNC: 5.9 G/DL (ref 6.4–8.3)
RBC # BLD AUTO: 3.93 M/UL (ref 4.21–5.77)
SAMPLE SITE: ABNORMAL
SODIUM SERPL-SCNC: 154 MMOL/L (ref 135–144)
SODIUM SERPL-SCNC: 154 MMOL/L (ref 135–144)
SODIUM SERPL-SCNC: 155 MMOL/L (ref 135–144)
WBC OTHER # BLD: 6 K/UL (ref 3.5–11.3)

## 2023-10-30 PROCEDURE — 6360000002 HC RX W HCPCS

## 2023-10-30 PROCEDURE — 2580000003 HC RX 258

## 2023-10-30 PROCEDURE — 82803 BLOOD GASES ANY COMBINATION: CPT

## 2023-10-30 PROCEDURE — 83735 ASSAY OF MAGNESIUM: CPT

## 2023-10-30 PROCEDURE — 85025 COMPLETE CBC W/AUTO DIFF WBC: CPT

## 2023-10-30 PROCEDURE — 2000000000 HC ICU R&B

## 2023-10-30 PROCEDURE — 99291 CRITICAL CARE FIRST HOUR: CPT | Performed by: INTERNAL MEDICINE

## 2023-10-30 PROCEDURE — 2500000003 HC RX 250 WO HCPCS

## 2023-10-30 PROCEDURE — 36600 WITHDRAWAL OF ARTERIAL BLOOD: CPT

## 2023-10-30 PROCEDURE — 94660 CPAP INITIATION&MGMT: CPT

## 2023-10-30 PROCEDURE — 80048 BASIC METABOLIC PNL TOTAL CA: CPT

## 2023-10-30 PROCEDURE — 2580000003 HC RX 258: Performed by: STUDENT IN AN ORGANIZED HEALTH CARE EDUCATION/TRAINING PROGRAM

## 2023-10-30 PROCEDURE — 80053 COMPREHEN METABOLIC PANEL: CPT

## 2023-10-30 PROCEDURE — 2580000003 HC RX 258: Performed by: PEDIATRICS

## 2023-10-30 PROCEDURE — 82947 ASSAY GLUCOSE BLOOD QUANT: CPT

## 2023-10-30 PROCEDURE — 6360000002 HC RX W HCPCS: Performed by: STUDENT IN AN ORGANIZED HEALTH CARE EDUCATION/TRAINING PROGRAM

## 2023-10-30 PROCEDURE — 94664 DEMO&/EVAL PT USE INHALER: CPT

## 2023-10-30 PROCEDURE — 36415 COLL VENOUS BLD VENIPUNCTURE: CPT

## 2023-10-30 RX ORDER — POTASSIUM CHLORIDE 29.8 MG/ML
20 INJECTION INTRAVENOUS
Status: COMPLETED | OUTPATIENT
Start: 2023-10-30 | End: 2023-10-30

## 2023-10-30 RX ORDER — FENTANYL CITRATE 50 UG/ML
100 INJECTION, SOLUTION INTRAMUSCULAR; INTRAVENOUS
Status: DISCONTINUED | OUTPATIENT
Start: 2023-10-30 | End: 2023-10-30

## 2023-10-30 RX ORDER — FENTANYL CITRATE 50 UG/ML
100 INJECTION, SOLUTION INTRAMUSCULAR; INTRAVENOUS
Status: DISCONTINUED | OUTPATIENT
Start: 2023-10-30 | End: 2023-11-09 | Stop reason: HOSPADM

## 2023-10-30 RX ADMIN — SODIUM CHLORIDE, PRESERVATIVE FREE 10 ML: 5 INJECTION INTRAVENOUS at 09:18

## 2023-10-30 RX ADMIN — DEXMEDETOMIDINE HYDROCHLORIDE 1.5 MCG/KG/HR: 400 INJECTION, SOLUTION INTRAVENOUS at 02:22

## 2023-10-30 RX ADMIN — SODIUM CHLORIDE: 9 INJECTION, SOLUTION INTRAVENOUS at 15:59

## 2023-10-30 RX ADMIN — DEXMEDETOMIDINE HYDROCHLORIDE 1.5 MCG/KG/HR: 400 INJECTION, SOLUTION INTRAVENOUS at 09:29

## 2023-10-30 RX ADMIN — HEPARIN SODIUM 5000 UNITS: 5000 INJECTION INTRAVENOUS; SUBCUTANEOUS at 16:00

## 2023-10-30 RX ADMIN — SODIUM CHLORIDE 3000 MG: 900 INJECTION INTRAVENOUS at 01:00

## 2023-10-30 RX ADMIN — HYDROCORTISONE SODIUM SUCCINATE 25 MG: 100 INJECTION, POWDER, FOR SOLUTION INTRAMUSCULAR; INTRAVENOUS at 20:55

## 2023-10-30 RX ADMIN — SODIUM CHLORIDE, PRESERVATIVE FREE 10 ML: 5 INJECTION INTRAVENOUS at 09:17

## 2023-10-30 RX ADMIN — POTASSIUM CHLORIDE 20 MEQ: 29.8 INJECTION, SOLUTION INTRAVENOUS at 02:54

## 2023-10-30 RX ADMIN — SODIUM CHLORIDE 3000 MG: 900 INJECTION INTRAVENOUS at 09:09

## 2023-10-30 RX ADMIN — DEXMEDETOMIDINE HYDROCHLORIDE 1.5 MCG/KG/HR: 400 INJECTION, SOLUTION INTRAVENOUS at 08:02

## 2023-10-30 RX ADMIN — HEPARIN SODIUM 5000 UNITS: 5000 INJECTION INTRAVENOUS; SUBCUTANEOUS at 21:39

## 2023-10-30 RX ADMIN — DEXMEDETOMIDINE HYDROCHLORIDE 1.5 MCG/KG/HR: 400 INJECTION, SOLUTION INTRAVENOUS at 03:37

## 2023-10-30 RX ADMIN — FENTANYL CITRATE 50 MCG: 50 INJECTION, SOLUTION INTRAMUSCULAR; INTRAVENOUS at 23:11

## 2023-10-30 RX ADMIN — DEXMEDETOMIDINE HYDROCHLORIDE 1.3 MCG/KG/HR: 400 INJECTION, SOLUTION INTRAVENOUS at 12:41

## 2023-10-30 RX ADMIN — DEXMEDETOMIDINE HYDROCHLORIDE 1.5 MCG/KG/HR: 400 INJECTION, SOLUTION INTRAVENOUS at 11:07

## 2023-10-30 RX ADMIN — DEXMEDETOMIDINE HYDROCHLORIDE 1.5 MCG/KG/HR: 400 INJECTION, SOLUTION INTRAVENOUS at 23:10

## 2023-10-30 RX ADMIN — SODIUM CHLORIDE 3000 MG: 900 INJECTION INTRAVENOUS at 21:31

## 2023-10-30 RX ADMIN — DEXMEDETOMIDINE HYDROCHLORIDE 0.9 MCG/KG/HR: 400 INJECTION, SOLUTION INTRAVENOUS at 16:13

## 2023-10-30 RX ADMIN — POTASSIUM CHLORIDE 20 MEQ: 29.8 INJECTION, SOLUTION INTRAVENOUS at 00:52

## 2023-10-30 RX ADMIN — SODIUM CHLORIDE: 9 INJECTION, SOLUTION INTRAVENOUS at 09:08

## 2023-10-30 RX ADMIN — HYDROCORTISONE SODIUM SUCCINATE 25 MG: 100 INJECTION, POWDER, FOR SOLUTION INTRAMUSCULAR; INTRAVENOUS at 09:25

## 2023-10-30 RX ADMIN — DEXMEDETOMIDINE HYDROCHLORIDE 1.5 MCG/KG/HR: 400 INJECTION, SOLUTION INTRAVENOUS at 05:06

## 2023-10-30 RX ADMIN — FENTANYL CITRATE 50 MCG: 50 INJECTION, SOLUTION INTRAMUSCULAR; INTRAVENOUS at 01:39

## 2023-10-30 RX ADMIN — SODIUM CHLORIDE, PRESERVATIVE FREE 10 ML: 5 INJECTION INTRAVENOUS at 19:28

## 2023-10-30 RX ADMIN — FENTANYL CITRATE 50 MCG: 50 INJECTION, SOLUTION INTRAMUSCULAR; INTRAVENOUS at 05:09

## 2023-10-30 RX ADMIN — DEXMEDETOMIDINE HYDROCHLORIDE 1.1 MCG/KG/HR: 400 INJECTION, SOLUTION INTRAVENOUS at 14:08

## 2023-10-30 RX ADMIN — HEPARIN SODIUM 5000 UNITS: 5000 INJECTION INTRAVENOUS; SUBCUTANEOUS at 06:38

## 2023-10-30 RX ADMIN — POTASSIUM CHLORIDE 50 ML/HR: 2 INJECTION, SOLUTION, CONCENTRATE INTRAVENOUS at 12:19

## 2023-10-30 RX ADMIN — DEXMEDETOMIDINE HYDROCHLORIDE 1.5 MCG/KG/HR: 400 INJECTION, SOLUTION INTRAVENOUS at 01:02

## 2023-10-30 RX ADMIN — SODIUM CHLORIDE 3000 MG: 900 INJECTION INTRAVENOUS at 15:59

## 2023-10-30 RX ADMIN — DEXMEDETOMIDINE HYDROCHLORIDE 0.7 MCG/KG/HR: 400 INJECTION, SOLUTION INTRAVENOUS at 19:20

## 2023-10-30 RX ADMIN — DEXMEDETOMIDINE HYDROCHLORIDE 1.5 MCG/KG/HR: 400 INJECTION, SOLUTION INTRAVENOUS at 06:39

## 2023-10-30 RX ADMIN — DEXMEDETOMIDINE HYDROCHLORIDE 1 MCG/KG/HR: 400 INJECTION, SOLUTION INTRAVENOUS at 21:33

## 2023-10-30 RX ADMIN — SODIUM CHLORIDE: 9 INJECTION, SOLUTION INTRAVENOUS at 00:48

## 2023-10-30 ASSESSMENT — PULMONARY FUNCTION TESTS
PIF_VALUE: 20
PIF_VALUE: 15
PIF_VALUE: 16
PIF_VALUE: 16
PIF_VALUE: 17
PIF_VALUE: 18
PIF_VALUE: 17
PIF_VALUE: 19
PIF_VALUE: 16

## 2023-10-30 ASSESSMENT — PAIN SCALES - GENERAL
PAINLEVEL_OUTOF10: 0
PAINLEVEL_OUTOF10: 0

## 2023-10-31 LAB
ALBUMIN SERPL-MCNC: 2.9 G/DL (ref 3.5–5.2)
ALBUMIN/GLOB SERPL: 1 {RATIO} (ref 1–2.5)
ALP SERPL-CCNC: 93 U/L (ref 40–129)
ALT SERPL-CCNC: 29 U/L (ref 5–41)
ANION GAP SERPL CALCULATED.3IONS-SCNC: 13 MMOL/L (ref 9–17)
ANION GAP SERPL CALCULATED.3IONS-SCNC: 15 MMOL/L (ref 9–17)
AST SERPL-CCNC: 30 U/L
BASOPHILS # BLD: 0 K/UL (ref 0–0.2)
BASOPHILS NFR BLD: 0 % (ref 0–2)
BILIRUB SERPL-MCNC: 0.7 MG/DL (ref 0.3–1.2)
BUN SERPL-MCNC: 30 MG/DL (ref 6–20)
BUN SERPL-MCNC: 31 MG/DL (ref 6–20)
CALCIUM SERPL-MCNC: 8.1 MG/DL (ref 8.6–10.4)
CALCIUM SERPL-MCNC: 8.3 MG/DL (ref 8.6–10.4)
CHLORIDE SERPL-SCNC: 109 MMOL/L (ref 98–107)
CHLORIDE SERPL-SCNC: 111 MMOL/L (ref 98–107)
CO2 SERPL-SCNC: 32 MMOL/L (ref 20–31)
CO2 SERPL-SCNC: 33 MMOL/L (ref 20–31)
CREAT SERPL-MCNC: 1.6 MG/DL (ref 0.7–1.2)
CREAT SERPL-MCNC: 1.7 MG/DL (ref 0.7–1.2)
EOSINOPHIL # BLD: 0.06 K/UL (ref 0–0.44)
EOSINOPHILS RELATIVE PERCENT: 1 % (ref 1–4)
ERYTHROCYTE [DISTWIDTH] IN BLOOD BY AUTOMATED COUNT: 17.7 % (ref 11.8–14.4)
GFR SERPL CREATININE-BSD FRML MDRD: 46 ML/MIN/1.73M2
GFR SERPL CREATININE-BSD FRML MDRD: 49 ML/MIN/1.73M2
GLUCOSE BLD-MCNC: 164 MG/DL (ref 75–110)
GLUCOSE BLD-MCNC: 171 MG/DL (ref 75–110)
GLUCOSE BLD-MCNC: 224 MG/DL (ref 75–110)
GLUCOSE BLD-MCNC: 224 MG/DL (ref 75–110)
GLUCOSE BLD-MCNC: 237 MG/DL (ref 75–110)
GLUCOSE SERPL-MCNC: 196 MG/DL (ref 70–99)
GLUCOSE SERPL-MCNC: 254 MG/DL (ref 70–99)
HCT VFR BLD AUTO: 36.7 % (ref 40.7–50.3)
HGB BLD-MCNC: 9.7 G/DL (ref 13–17)
IMM GRANULOCYTES # BLD AUTO: 0.18 K/UL (ref 0–0.3)
IMM GRANULOCYTES NFR BLD: 3 %
LYMPHOCYTES NFR BLD: 0.66 K/UL (ref 1.1–3.7)
LYMPHOCYTES RELATIVE PERCENT: 11 % (ref 24–43)
MCH RBC QN AUTO: 25.3 PG (ref 25.2–33.5)
MCHC RBC AUTO-ENTMCNC: 26.4 G/DL (ref 28.4–34.8)
MCV RBC AUTO: 95.6 FL (ref 82.6–102.9)
MONOCYTES NFR BLD: 0.3 K/UL (ref 0.1–1.2)
MONOCYTES NFR BLD: 5 % (ref 3–12)
MORPHOLOGY: ABNORMAL
MORPHOLOGY: ABNORMAL
NEUTROPHILS NFR BLD: 80 % (ref 36–65)
NEUTS SEG NFR BLD: 4.8 K/UL (ref 1.5–8.1)
NRBC BLD-RTO: 0 PER 100 WBC
PLATELET # BLD AUTO: 122 K/UL (ref 138–453)
PMV BLD AUTO: 12.4 FL (ref 8.1–13.5)
POTASSIUM SERPL-SCNC: 4.3 MMOL/L (ref 3.7–5.3)
POTASSIUM SERPL-SCNC: 4.3 MMOL/L (ref 3.7–5.3)
PROT SERPL-MCNC: 5.8 G/DL (ref 6.4–8.3)
RBC # BLD AUTO: 3.84 M/UL (ref 4.21–5.77)
REASON FOR REJECTION: NORMAL
SODIUM SERPL-SCNC: 156 MMOL/L (ref 135–144)
SODIUM SERPL-SCNC: 157 MMOL/L (ref 135–144)
SPECIMEN SOURCE: NORMAL
WBC OTHER # BLD: 6 K/UL (ref 3.5–11.3)
ZZ NTE CLEAN UP: ORDERED TEST: NORMAL

## 2023-10-31 PROCEDURE — 2580000003 HC RX 258

## 2023-10-31 PROCEDURE — 99291 CRITICAL CARE FIRST HOUR: CPT | Performed by: INTERNAL MEDICINE

## 2023-10-31 PROCEDURE — 80048 BASIC METABOLIC PNL TOTAL CA: CPT

## 2023-10-31 PROCEDURE — 6370000000 HC RX 637 (ALT 250 FOR IP)

## 2023-10-31 PROCEDURE — 2500000003 HC RX 250 WO HCPCS

## 2023-10-31 PROCEDURE — 85025 COMPLETE CBC W/AUTO DIFF WBC: CPT

## 2023-10-31 PROCEDURE — 99223 1ST HOSP IP/OBS HIGH 75: CPT | Performed by: INTERNAL MEDICINE

## 2023-10-31 PROCEDURE — 36415 COLL VENOUS BLD VENIPUNCTURE: CPT

## 2023-10-31 PROCEDURE — 2580000003 HC RX 258: Performed by: STUDENT IN AN ORGANIZED HEALTH CARE EDUCATION/TRAINING PROGRAM

## 2023-10-31 PROCEDURE — 2000000000 HC ICU R&B

## 2023-10-31 PROCEDURE — 93005 ELECTROCARDIOGRAM TRACING: CPT

## 2023-10-31 PROCEDURE — 6360000002 HC RX W HCPCS

## 2023-10-31 PROCEDURE — 82947 ASSAY GLUCOSE BLOOD QUANT: CPT

## 2023-10-31 PROCEDURE — 80053 COMPREHEN METABOLIC PANEL: CPT

## 2023-10-31 PROCEDURE — 94761 N-INVAS EAR/PLS OXIMETRY MLT: CPT

## 2023-10-31 PROCEDURE — 2580000003 HC RX 258: Performed by: PEDIATRICS

## 2023-10-31 PROCEDURE — 2700000000 HC OXYGEN THERAPY PER DAY

## 2023-10-31 PROCEDURE — 6360000002 HC RX W HCPCS: Performed by: STUDENT IN AN ORGANIZED HEALTH CARE EDUCATION/TRAINING PROGRAM

## 2023-10-31 PROCEDURE — 6360000002 HC RX W HCPCS: Performed by: EMERGENCY MEDICINE

## 2023-10-31 RX ORDER — HALOPERIDOL 5 MG/ML
2 INJECTION INTRAMUSCULAR ONCE
Status: COMPLETED | OUTPATIENT
Start: 2023-10-31 | End: 2023-10-31

## 2023-10-31 RX ORDER — DEXTROSE MONOHYDRATE 50 MG/ML
INJECTION, SOLUTION INTRAVENOUS CONTINUOUS
Status: DISCONTINUED | OUTPATIENT
Start: 2023-10-31 | End: 2023-11-03

## 2023-10-31 RX ADMIN — DEXMEDETOMIDINE HYDROCHLORIDE 1.5 MCG/KG/HR: 400 INJECTION, SOLUTION INTRAVENOUS at 03:29

## 2023-10-31 RX ADMIN — SODIUM CHLORIDE 3000 MG: 900 INJECTION INTRAVENOUS at 03:40

## 2023-10-31 RX ADMIN — SODIUM CHLORIDE, PRESERVATIVE FREE 10 ML: 5 INJECTION INTRAVENOUS at 20:33

## 2023-10-31 RX ADMIN — DEXTROSE MONOHYDRATE: 50 INJECTION, SOLUTION INTRAVENOUS at 14:12

## 2023-10-31 RX ADMIN — SODIUM CHLORIDE, PRESERVATIVE FREE 10 ML: 5 INJECTION INTRAVENOUS at 08:15

## 2023-10-31 RX ADMIN — DEXMEDETOMIDINE HYDROCHLORIDE 1.5 MCG/KG/HR: 400 INJECTION, SOLUTION INTRAVENOUS at 06:05

## 2023-10-31 RX ADMIN — HEPARIN SODIUM 5000 UNITS: 5000 INJECTION INTRAVENOUS; SUBCUTANEOUS at 05:19

## 2023-10-31 RX ADMIN — DEXMEDETOMIDINE HYDROCHLORIDE 1.1 MCG/KG/HR: 400 INJECTION, SOLUTION INTRAVENOUS at 22:57

## 2023-10-31 RX ADMIN — FENTANYL CITRATE 100 MCG: 50 INJECTION, SOLUTION INTRAMUSCULAR; INTRAVENOUS at 01:30

## 2023-10-31 RX ADMIN — DEXMEDETOMIDINE HYDROCHLORIDE 1.2 MCG/KG/HR: 400 INJECTION, SOLUTION INTRAVENOUS at 09:14

## 2023-10-31 RX ADMIN — INSULIN LISPRO 2 UNITS: 100 INJECTION, SOLUTION INTRAVENOUS; SUBCUTANEOUS at 23:12

## 2023-10-31 RX ADMIN — HYDROCORTISONE SODIUM SUCCINATE 25 MG: 100 INJECTION, POWDER, FOR SOLUTION INTRAMUSCULAR; INTRAVENOUS at 08:14

## 2023-10-31 RX ADMIN — DEXMEDETOMIDINE HYDROCHLORIDE 1.1 MCG/KG/HR: 400 INJECTION, SOLUTION INTRAVENOUS at 21:05

## 2023-10-31 RX ADMIN — INSULIN LISPRO 2 UNITS: 100 INJECTION, SOLUTION INTRAVENOUS; SUBCUTANEOUS at 17:34

## 2023-10-31 RX ADMIN — SODIUM CHLORIDE 3000 MG: 900 INJECTION INTRAVENOUS at 10:59

## 2023-10-31 RX ADMIN — HEPARIN SODIUM 5000 UNITS: 5000 INJECTION INTRAVENOUS; SUBCUTANEOUS at 20:33

## 2023-10-31 RX ADMIN — HALOPERIDOL LACTATE 2 MG: 5 INJECTION, SOLUTION INTRAMUSCULAR at 02:36

## 2023-10-31 RX ADMIN — HYDROCORTISONE SODIUM SUCCINATE 25 MG: 100 INJECTION, POWDER, FOR SOLUTION INTRAMUSCULAR; INTRAVENOUS at 20:32

## 2023-10-31 RX ADMIN — BACITRACIN: 500 OINTMENT TOPICAL at 08:14

## 2023-10-31 RX ADMIN — DEXMEDETOMIDINE HYDROCHLORIDE 1.5 MCG/KG/HR: 400 INJECTION, SOLUTION INTRAVENOUS at 04:42

## 2023-10-31 RX ADMIN — DEXMEDETOMIDINE HYDROCHLORIDE 1.5 MCG/KG/HR: 400 INJECTION, SOLUTION INTRAVENOUS at 02:04

## 2023-10-31 RX ADMIN — HEPARIN SODIUM 5000 UNITS: 5000 INJECTION INTRAVENOUS; SUBCUTANEOUS at 14:47

## 2023-10-31 RX ADMIN — DEXMEDETOMIDINE HYDROCHLORIDE 1.5 MCG/KG/HR: 400 INJECTION, SOLUTION INTRAVENOUS at 00:27

## 2023-10-31 RX ADMIN — FENTANYL CITRATE 100 MCG: 50 INJECTION, SOLUTION INTRAMUSCULAR; INTRAVENOUS at 03:26

## 2023-10-31 RX ADMIN — DEXMEDETOMIDINE HYDROCHLORIDE 1.3 MCG/KG/HR: 400 INJECTION, SOLUTION INTRAVENOUS at 11:00

## 2023-10-31 RX ADMIN — DEXMEDETOMIDINE HYDROCHLORIDE 1.3 MCG/KG/HR: 400 INJECTION, SOLUTION INTRAVENOUS at 14:27

## 2023-10-31 RX ADMIN — DEXMEDETOMIDINE HYDROCHLORIDE 1.3 MCG/KG/HR: 400 INJECTION, SOLUTION INTRAVENOUS at 16:07

## 2023-10-31 RX ADMIN — DEXTROSE MONOHYDRATE 1000 ML: 50 INJECTION, SOLUTION INTRAVENOUS at 12:58

## 2023-10-31 RX ADMIN — WATER 10 MG: 1 INJECTION INTRAMUSCULAR; INTRAVENOUS; SUBCUTANEOUS at 03:42

## 2023-10-31 RX ADMIN — DEXMEDETOMIDINE HYDROCHLORIDE 1.3 MCG/KG/HR: 400 INJECTION, SOLUTION INTRAVENOUS at 12:50

## 2023-10-31 RX ADMIN — HALOPERIDOL LACTATE 2 MG: 5 INJECTION, SOLUTION INTRAMUSCULAR at 00:21

## 2023-10-31 RX ADMIN — DEXMEDETOMIDINE HYDROCHLORIDE 1.5 MCG/KG/HR: 400 INJECTION, SOLUTION INTRAVENOUS at 07:35

## 2023-10-31 RX ADMIN — DEXMEDETOMIDINE HYDROCHLORIDE 1.3 MCG/KG/HR: 400 INJECTION, SOLUTION INTRAVENOUS at 19:23

## 2023-10-31 RX ADMIN — DEXMEDETOMIDINE HYDROCHLORIDE 1.3 MCG/KG/HR: 400 INJECTION, SOLUTION INTRAVENOUS at 17:42

## 2023-10-31 RX ADMIN — POTASSIUM CHLORIDE 50 ML/HR: 2 INJECTION, SOLUTION, CONCENTRATE INTRAVENOUS at 09:45

## 2023-10-31 ASSESSMENT — PULMONARY FUNCTION TESTS
PIF_VALUE: 19
PIF_VALUE: 17
PIF_VALUE: 20
PIF_VALUE: 17
PIF_VALUE: 14
PIF_VALUE: 17
PIF_VALUE: 18
PIF_VALUE: 18
PIF_VALUE: 16
PIF_VALUE: 15
PIF_VALUE: 19
PIF_VALUE: 17

## 2023-11-01 ENCOUNTER — APPOINTMENT (OUTPATIENT)
Age: 58
DRG: 004 | End: 2023-11-01
Payer: MEDICARE

## 2023-11-01 LAB
ALBUMIN SERPL-MCNC: 2.9 G/DL (ref 3.5–5.2)
ALBUMIN/GLOB SERPL: 0.9 {RATIO} (ref 1–2.5)
ALLEN TEST: POSITIVE
ALP SERPL-CCNC: 93 U/L (ref 40–129)
ALT SERPL-CCNC: 28 U/L (ref 5–41)
ANION GAP SERPL CALCULATED.3IONS-SCNC: 7 MMOL/L (ref 9–17)
ANION GAP SERPL CALCULATED.3IONS-SCNC: 9 MMOL/L (ref 9–17)
AST SERPL-CCNC: 34 U/L
BASOPHILS # BLD: 0 K/UL (ref 0–0.2)
BASOPHILS NFR BLD: 0 % (ref 0–2)
BILIRUB SERPL-MCNC: 0.7 MG/DL (ref 0.3–1.2)
BUN SERPL-MCNC: 28 MG/DL (ref 6–20)
CALCIUM SERPL-MCNC: 8.3 MG/DL (ref 8.6–10.4)
CHLORIDE SERPL-SCNC: 106 MMOL/L (ref 98–107)
CHLORIDE SERPL-SCNC: 107 MMOL/L (ref 98–107)
CO2 SERPL-SCNC: 36 MMOL/L (ref 20–31)
CO2 SERPL-SCNC: 38 MMOL/L (ref 20–31)
CREAT SERPL-MCNC: 1.5 MG/DL (ref 0.7–1.2)
ECHO AO ROOT DIAM: 3.6 CM
ECHO AO ROOT INDEX: 1.26 CM/M2
ECHO AV AREA PEAK VELOCITY: 0.7 CM2
ECHO AV AREA VTI: 0.7 CM2
ECHO AV AREA/BSA PEAK VELOCITY: 0.2 CM2/M2
ECHO AV AREA/BSA VTI: 0.2 CM2/M2
ECHO AV MEAN GRADIENT: 11 MMHG
ECHO AV MEAN VELOCITY: 1.5 M/S
ECHO AV PEAK GRADIENT: 22 MMHG
ECHO AV PEAK VELOCITY: 2.4 M/S
ECHO AV VELOCITY RATIO: 0.17
ECHO AV VTI: 46.8 CM
ECHO BSA: 3.05 M2
ECHO LA AREA 2C: 30.1 CM2
ECHO LA AREA 4C: 31.5 CM2
ECHO LA DIAMETER INDEX: 1.68 CM/M2
ECHO LA DIAMETER: 4.8 CM
ECHO LA MAJOR AXIS: 7.2 CM
ECHO LA MINOR AXIS: 6.8 CM
ECHO LA TO AORTIC ROOT RATIO: 1.33
ECHO LA VOL A-L A2C: 108 ML (ref 18–58)
ECHO LA VOL A-L A4C: 113 ML (ref 18–58)
ECHO LA VOL BP: 113 ML (ref 18–58)
ECHO LA VOL/BSA BIPLANE: 40 ML/M2 (ref 16–34)
ECHO LA VOLUME INDEX A-L A2C: 38 ML/M2 (ref 16–34)
ECHO LA VOLUME INDEX A-L A4C: 40 ML/M2 (ref 16–34)
ECHO LV E' LATERAL VELOCITY: 9 CM/S
ECHO LV E' SEPTAL VELOCITY: 7 CM/S
ECHO LV FRACTIONAL SHORTENING: 18 % (ref 28–44)
ECHO LV INTERNAL DIMENSION DIASTOLE INDEX: 1.78 CM/M2
ECHO LV INTERNAL DIMENSION DIASTOLIC: 5.1 CM (ref 4.2–5.9)
ECHO LV INTERNAL DIMENSION SYSTOLIC INDEX: 1.47 CM/M2
ECHO LV INTERNAL DIMENSION SYSTOLIC: 4.2 CM
ECHO LV IVSD: 1.2 CM (ref 0.6–1)
ECHO LV MASS 2D: 227.4 G (ref 88–224)
ECHO LV MASS INDEX 2D: 79.5 G/M2 (ref 49–115)
ECHO LV POSTERIOR WALL DIASTOLIC: 1.1 CM (ref 0.6–1)
ECHO LV RELATIVE WALL THICKNESS RATIO: 0.43
ECHO LVOT AREA: 4.2 CM2
ECHO LVOT AV VTI INDEX: 0.17
ECHO LVOT DIAM: 2.3 CM
ECHO LVOT MEAN GRADIENT: 0 MMHG
ECHO LVOT PEAK GRADIENT: 1 MMHG
ECHO LVOT PEAK VELOCITY: 0.4 M/S
ECHO LVOT STROKE VOLUME INDEX: 11.8 ML/M2
ECHO LVOT SV: 33.6 ML
ECHO LVOT VTI: 8.1 CM
ECHO MV A VELOCITY: 0.86 M/S
ECHO MV AREA VTI: 0.9 CM2
ECHO MV E DECELERATION TIME (DT): 119 MS
ECHO MV E VELOCITY: 1.36 M/S
ECHO MV E/A RATIO: 1.58
ECHO MV E/E' LATERAL: 15.11
ECHO MV E/E' RATIO (AVERAGED): 17.27
ECHO MV E/E' SEPTAL: 19.43
ECHO MV LVOT VTI INDEX: 4.46
ECHO MV MAX VELOCITY: 1.7 M/S
ECHO MV MEAN GRADIENT: 4 MMHG
ECHO MV MEAN VELOCITY: 0.9 M/S
ECHO MV PEAK GRADIENT: 12 MMHG
ECHO MV VTI: 36.1 CM
ECHO PULMONARY ARTERY SYSTOLIC PRESSURE (PASP): 8 MMHG
ECHO PV MAX VELOCITY: 0.9 M/S
ECHO PV PEAK GRADIENT: 3 MMHG
ECHO RV BASAL DIMENSION: 5.2 CM
ECHO RV FREE WALL PEAK S': 11 CM/S
ECHO TV REGURGITANT MAX VELOCITY: 3.95 M/S
ECHO TV REGURGITANT PEAK GRADIENT: 70 MMHG
EKG ATRIAL RATE: 92 BPM
EKG Q-T INTERVAL: 396 MS
EKG QRS DURATION: 152 MS
EKG QTC CALCULATION (BAZETT): 476 MS
EKG R AXIS: -39 DEGREES
EKG T AXIS: 93 DEGREES
EKG VENTRICULAR RATE: 87 BPM
EOSINOPHIL # BLD: 0.25 K/UL (ref 0–0.4)
EOSINOPHILS RELATIVE PERCENT: 4 % (ref 1–4)
ERYTHROCYTE [DISTWIDTH] IN BLOOD BY AUTOMATED COUNT: 17.8 % (ref 11.8–14.4)
FIO2: 50
GFR SERPL CREATININE-BSD FRML MDRD: 53 ML/MIN/1.73M2
GLUCOSE BLD-MCNC: 176 MG/DL (ref 75–110)
GLUCOSE BLD-MCNC: 208 MG/DL (ref 75–110)
GLUCOSE BLD-MCNC: 227 MG/DL (ref 75–110)
GLUCOSE BLD-MCNC: 234 MG/DL (ref 75–110)
GLUCOSE BLD-MCNC: 253 MG/DL (ref 74–100)
GLUCOSE SERPL-MCNC: 250 MG/DL (ref 70–99)
HCT VFR BLD AUTO: 36.8 % (ref 40.7–50.3)
HGB BLD-MCNC: 10 G/DL (ref 13–17)
IMM GRANULOCYTES # BLD AUTO: 0.37 K/UL (ref 0–0.3)
IMM GRANULOCYTES NFR BLD: 6 %
LYMPHOCYTES NFR BLD: 0.56 K/UL (ref 1–4.8)
LYMPHOCYTES RELATIVE PERCENT: 9 % (ref 24–44)
MCH RBC QN AUTO: 25.6 PG (ref 25.2–33.5)
MCHC RBC AUTO-ENTMCNC: 27.2 G/DL (ref 28.4–34.8)
MCV RBC AUTO: 94.1 FL (ref 82.6–102.9)
MODE: ABNORMAL
MONOCYTES NFR BLD: 0.25 K/UL (ref 0.1–0.8)
MONOCYTES NFR BLD: 4 % (ref 1–7)
MORPHOLOGY: ABNORMAL
NEUTROPHILS NFR BLD: 77 % (ref 36–66)
NEUTS SEG NFR BLD: 4.77 K/UL (ref 1.8–7.7)
NRBC BLD-RTO: 0 PER 100 WBC
O2 DELIVERY DEVICE: ABNORMAL
PLATELET # BLD AUTO: 149 K/UL (ref 138–453)
PMV BLD AUTO: 12.8 FL (ref 8.1–13.5)
POC HCO3: 42.7 MMOL/L (ref 21–28)
POC O2 SATURATION: 98.5 % (ref 94–98)
POC PCO2: 79.8 MM HG (ref 35–48)
POC PH: 7.34 (ref 7.35–7.45)
POC PO2: 130.8 MM HG (ref 83–108)
POSITIVE BASE EXCESS, ART: 13.5 MMOL/L (ref 0–3)
POTASSIUM SERPL-SCNC: 3.5 MMOL/L (ref 3.7–5.3)
POTASSIUM SERPL-SCNC: 4.1 MMOL/L (ref 3.7–5.3)
PROT SERPL-MCNC: 6.1 G/DL (ref 6.4–8.3)
RBC # BLD AUTO: 3.91 M/UL (ref 4.21–5.77)
SAMPLE SITE: ABNORMAL
SODIUM SERPL-SCNC: 151 MMOL/L (ref 135–144)
SODIUM SERPL-SCNC: 152 MMOL/L (ref 135–144)
WBC OTHER # BLD: 6.2 K/UL (ref 3.5–11.3)

## 2023-11-01 PROCEDURE — 6360000002 HC RX W HCPCS

## 2023-11-01 PROCEDURE — 82947 ASSAY GLUCOSE BLOOD QUANT: CPT

## 2023-11-01 PROCEDURE — 80053 COMPREHEN METABOLIC PANEL: CPT

## 2023-11-01 PROCEDURE — 2580000003 HC RX 258: Performed by: PEDIATRICS

## 2023-11-01 PROCEDURE — 99232 SBSQ HOSP IP/OBS MODERATE 35: CPT | Performed by: INTERNAL MEDICINE

## 2023-11-01 PROCEDURE — 82803 BLOOD GASES ANY COMBINATION: CPT

## 2023-11-01 PROCEDURE — 80051 ELECTROLYTE PANEL: CPT

## 2023-11-01 PROCEDURE — 6360000002 HC RX W HCPCS: Performed by: STUDENT IN AN ORGANIZED HEALTH CARE EDUCATION/TRAINING PROGRAM

## 2023-11-01 PROCEDURE — 36600 WITHDRAWAL OF ARTERIAL BLOOD: CPT

## 2023-11-01 PROCEDURE — 94761 N-INVAS EAR/PLS OXIMETRY MLT: CPT

## 2023-11-01 PROCEDURE — 6370000000 HC RX 637 (ALT 250 FOR IP)

## 2023-11-01 PROCEDURE — 2500000003 HC RX 250 WO HCPCS

## 2023-11-01 PROCEDURE — 93306 TTE W/DOPPLER COMPLETE: CPT | Performed by: INTERNAL MEDICINE

## 2023-11-01 PROCEDURE — 85025 COMPLETE CBC W/AUTO DIFF WBC: CPT

## 2023-11-01 PROCEDURE — 2000000000 HC ICU R&B

## 2023-11-01 PROCEDURE — C8929 TTE W OR WO FOL WCON,DOPPLER: HCPCS

## 2023-11-01 PROCEDURE — 2580000003 HC RX 258: Performed by: STUDENT IN AN ORGANIZED HEALTH CARE EDUCATION/TRAINING PROGRAM

## 2023-11-01 PROCEDURE — 94660 CPAP INITIATION&MGMT: CPT

## 2023-11-01 PROCEDURE — 2700000000 HC OXYGEN THERAPY PER DAY

## 2023-11-01 PROCEDURE — 2580000003 HC RX 258

## 2023-11-01 PROCEDURE — 93010 ELECTROCARDIOGRAM REPORT: CPT | Performed by: INTERNAL MEDICINE

## 2023-11-01 PROCEDURE — 36415 COLL VENOUS BLD VENIPUNCTURE: CPT

## 2023-11-01 PROCEDURE — 99291 CRITICAL CARE FIRST HOUR: CPT | Performed by: INTERNAL MEDICINE

## 2023-11-01 RX ORDER — HEPARIN SODIUM 5000 [USP'U]/ML
7500 INJECTION, SOLUTION INTRAVENOUS; SUBCUTANEOUS EVERY 8 HOURS SCHEDULED
Status: COMPLETED | OUTPATIENT
Start: 2023-11-01 | End: 2023-11-07

## 2023-11-01 RX ORDER — LORAZEPAM 2 MG/ML
1 INJECTION INTRAMUSCULAR ONCE
Status: COMPLETED | OUTPATIENT
Start: 2023-11-01 | End: 2023-11-01

## 2023-11-01 RX ADMIN — DEXMEDETOMIDINE HYDROCHLORIDE 1.5 MCG/KG/HR: 400 INJECTION, SOLUTION INTRAVENOUS at 11:20

## 2023-11-01 RX ADMIN — DEXMEDETOMIDINE HYDROCHLORIDE 1.5 MCG/KG/HR: 400 INJECTION, SOLUTION INTRAVENOUS at 18:59

## 2023-11-01 RX ADMIN — POTASSIUM CHLORIDE 20 MEQ: 29.8 INJECTION, SOLUTION INTRAVENOUS at 17:10

## 2023-11-01 RX ADMIN — DEXMEDETOMIDINE HYDROCHLORIDE 1.5 MCG/KG/HR: 400 INJECTION, SOLUTION INTRAVENOUS at 22:28

## 2023-11-01 RX ADMIN — DEXMEDETOMIDINE HYDROCHLORIDE 1.3 MCG/KG/HR: 400 INJECTION, SOLUTION INTRAVENOUS at 15:51

## 2023-11-01 RX ADMIN — SODIUM CHLORIDE, PRESERVATIVE FREE 20 ML: 5 INJECTION INTRAVENOUS at 19:42

## 2023-11-01 RX ADMIN — SODIUM CHLORIDE, PRESERVATIVE FREE 10 ML: 5 INJECTION INTRAVENOUS at 08:17

## 2023-11-01 RX ADMIN — DEXMEDETOMIDINE HYDROCHLORIDE 1.5 MCG/KG/HR: 400 INJECTION, SOLUTION INTRAVENOUS at 17:31

## 2023-11-01 RX ADMIN — SODIUM CHLORIDE, PRESERVATIVE FREE 10 ML: 5 INJECTION INTRAVENOUS at 19:43

## 2023-11-01 RX ADMIN — DEXMEDETOMIDINE HYDROCHLORIDE 1.1 MCG/KG/HR: 400 INJECTION, SOLUTION INTRAVENOUS at 00:51

## 2023-11-01 RX ADMIN — FENTANYL CITRATE 100 MCG: 50 INJECTION, SOLUTION INTRAMUSCULAR; INTRAVENOUS at 05:05

## 2023-11-01 RX ADMIN — HEPARIN SODIUM 7500 UNITS: 5000 INJECTION INTRAVENOUS; SUBCUTANEOUS at 13:42

## 2023-11-01 RX ADMIN — PERFLUTREN 1.5 ML: 6.52 INJECTION, SUSPENSION INTRAVENOUS at 12:41

## 2023-11-01 RX ADMIN — DEXMEDETOMIDINE HYDROCHLORIDE 1.5 MCG/KG/HR: 400 INJECTION, SOLUTION INTRAVENOUS at 09:52

## 2023-11-01 RX ADMIN — HEPARIN SODIUM 7500 UNITS: 5000 INJECTION INTRAVENOUS; SUBCUTANEOUS at 21:46

## 2023-11-01 RX ADMIN — LORAZEPAM 1 MG: 2 INJECTION INTRAMUSCULAR; INTRAVENOUS at 13:38

## 2023-11-01 RX ADMIN — DEXMEDETOMIDINE HYDROCHLORIDE 1.3 MCG/KG/HR: 400 INJECTION, SOLUTION INTRAVENOUS at 08:07

## 2023-11-01 RX ADMIN — DEXMEDETOMIDINE HYDROCHLORIDE 1.3 MCG/KG/HR: 400 INJECTION, SOLUTION INTRAVENOUS at 06:22

## 2023-11-01 RX ADMIN — DEXTROSE MONOHYDRATE: 50 INJECTION, SOLUTION INTRAVENOUS at 20:42

## 2023-11-01 RX ADMIN — INSULIN LISPRO 2 UNITS: 100 INJECTION, SOLUTION INTRAVENOUS; SUBCUTANEOUS at 11:09

## 2023-11-01 RX ADMIN — INSULIN LISPRO 2 UNITS: 100 INJECTION, SOLUTION INTRAVENOUS; SUBCUTANEOUS at 05:14

## 2023-11-01 RX ADMIN — DEXMEDETOMIDINE HYDROCHLORIDE 1.5 MCG/KG/HR: 400 INJECTION, SOLUTION INTRAVENOUS at 23:51

## 2023-11-01 RX ADMIN — FENTANYL CITRATE 100 MCG: 50 INJECTION, SOLUTION INTRAMUSCULAR; INTRAVENOUS at 20:31

## 2023-11-01 RX ADMIN — INSULIN LISPRO 2 UNITS: 100 INJECTION, SOLUTION INTRAVENOUS; SUBCUTANEOUS at 21:07

## 2023-11-01 RX ADMIN — FENTANYL CITRATE 100 MCG: 50 INJECTION, SOLUTION INTRAMUSCULAR; INTRAVENOUS at 23:16

## 2023-11-01 RX ADMIN — DEXMEDETOMIDINE HYDROCHLORIDE 1.5 MCG/KG/HR: 400 INJECTION, SOLUTION INTRAVENOUS at 20:21

## 2023-11-01 RX ADMIN — FENTANYL CITRATE 100 MCG: 50 INJECTION, SOLUTION INTRAMUSCULAR; INTRAVENOUS at 10:52

## 2023-11-01 RX ADMIN — HYDROCORTISONE SODIUM SUCCINATE 25 MG: 100 INJECTION, POWDER, FOR SOLUTION INTRAMUSCULAR; INTRAVENOUS at 21:05

## 2023-11-01 RX ADMIN — HEPARIN SODIUM 5000 UNITS: 5000 INJECTION INTRAVENOUS; SUBCUTANEOUS at 05:06

## 2023-11-01 RX ADMIN — DEXTROSE MONOHYDRATE: 50 INJECTION, SOLUTION INTRAVENOUS at 03:10

## 2023-11-01 RX ADMIN — POTASSIUM CHLORIDE 20 MEQ: 29.8 INJECTION, SOLUTION INTRAVENOUS at 16:08

## 2023-11-01 RX ADMIN — HYDROCORTISONE SODIUM SUCCINATE 25 MG: 100 INJECTION, POWDER, FOR SOLUTION INTRAMUSCULAR; INTRAVENOUS at 08:19

## 2023-11-01 RX ADMIN — DEXMEDETOMIDINE HYDROCHLORIDE 1.3 MCG/KG/HR: 400 INJECTION, SOLUTION INTRAVENOUS at 02:42

## 2023-11-01 RX ADMIN — DEXMEDETOMIDINE HYDROCHLORIDE 1.1 MCG/KG/HR: 400 INJECTION, SOLUTION INTRAVENOUS at 04:34

## 2023-11-01 RX ADMIN — DEXMEDETOMIDINE HYDROCHLORIDE 1.5 MCG/KG/HR: 400 INJECTION, SOLUTION INTRAVENOUS at 12:51

## 2023-11-01 RX ADMIN — DEXMEDETOMIDINE HYDROCHLORIDE 1.5 MCG/KG/HR: 400 INJECTION, SOLUTION INTRAVENOUS at 14:25

## 2023-11-01 ASSESSMENT — PULMONARY FUNCTION TESTS
PIF_VALUE: 20
PIF_VALUE: 16
PIF_VALUE: 21
PIF_VALUE: 21
PIF_VALUE: 20
PIF_VALUE: 22
PIF_VALUE: 23

## 2023-11-01 NOTE — FLOWSHEET NOTE
1120: There was discussion during rounds with Hebert Quinonez RN and myself also brought up possible intubation d/t no nutrition in 6 days as well as being dependant on the bipap and possible need for new CVC or PICC that has been in for 12 days. Dr. Ольга De La Cruz discussed potential TPN. Palliative stated that Crit Care team needs to be updating family on pt's condition as well. Palliative stated discussion w/ 2 out of the four children was had and both are on the same page and would like everything done that is necessary to keep pt stable and alive. Dr. Carri Thacker stated that he would attempt to reach out to the other 2 children to see if they would sign off their decision making rights.     Yee Jean Baptiste RN

## 2023-11-02 ENCOUNTER — APPOINTMENT (OUTPATIENT)
Dept: GENERAL RADIOLOGY | Age: 58
DRG: 004 | End: 2023-11-02
Payer: MEDICARE

## 2023-11-02 PROBLEM — Z51.5 ENCOUNTER FOR PALLIATIVE CARE: Status: ACTIVE | Noted: 2023-11-02

## 2023-11-02 LAB
ALBUMIN SERPL-MCNC: 2.8 G/DL (ref 3.5–5.2)
ALBUMIN/GLOB SERPL: 0.9 {RATIO} (ref 1–2.5)
ALLEN TEST: POSITIVE
ALP SERPL-CCNC: 95 U/L (ref 40–129)
ALT SERPL-CCNC: 27 U/L (ref 5–41)
ANION GAP SERPL CALCULATED.3IONS-SCNC: 11 MMOL/L (ref 9–17)
AST SERPL-CCNC: 25 U/L
BASOPHILS # BLD: 0 K/UL (ref 0–0.2)
BASOPHILS NFR BLD: 0 % (ref 0–2)
BILIRUB SERPL-MCNC: 0.7 MG/DL (ref 0.3–1.2)
BUN SERPL-MCNC: 22 MG/DL (ref 6–20)
CALCIUM SERPL-MCNC: 8.4 MG/DL (ref 8.6–10.4)
CHLORIDE SERPL-SCNC: 107 MMOL/L (ref 98–107)
CO2 SERPL-SCNC: 32 MMOL/L (ref 20–31)
CREAT SERPL-MCNC: 1.3 MG/DL (ref 0.7–1.2)
EOSINOPHIL # BLD: 0.13 K/UL (ref 0–0.4)
EOSINOPHILS RELATIVE PERCENT: 2 % (ref 1–4)
ERYTHROCYTE [DISTWIDTH] IN BLOOD BY AUTOMATED COUNT: 18.1 % (ref 11.8–14.4)
FIO2: 100
FIO2: 30
FIO2: 30
GFR SERPL CREATININE-BSD FRML MDRD: >60 ML/MIN/1.73M2
GLUCOSE BLD-MCNC: 223 MG/DL (ref 75–110)
GLUCOSE BLD-MCNC: 229 MG/DL (ref 75–110)
GLUCOSE BLD-MCNC: 232 MG/DL (ref 75–110)
GLUCOSE BLD-MCNC: 247 MG/DL (ref 74–100)
GLUCOSE BLD-MCNC: 275 MG/DL (ref 74–100)
GLUCOSE SERPL-MCNC: 269 MG/DL (ref 70–99)
HCT VFR BLD AUTO: 38.2 % (ref 40.7–50.3)
HGB BLD-MCNC: 10.4 G/DL (ref 13–17)
IMM GRANULOCYTES # BLD AUTO: 0.4 K/UL (ref 0–0.3)
IMM GRANULOCYTES NFR BLD: 6 %
LYMPHOCYTES NFR BLD: 0.59 K/UL (ref 1–4.8)
LYMPHOCYTES RELATIVE PERCENT: 9 % (ref 24–44)
MCH RBC QN AUTO: 25.7 PG (ref 25.2–33.5)
MCHC RBC AUTO-ENTMCNC: 27.2 G/DL (ref 28.4–34.8)
MCV RBC AUTO: 94.6 FL (ref 82.6–102.9)
MONOCYTES NFR BLD: 0.07 K/UL (ref 0.1–0.8)
MONOCYTES NFR BLD: 1 % (ref 1–7)
MORPHOLOGY: ABNORMAL
NEUTROPHILS NFR BLD: 82 % (ref 36–66)
NEUTS SEG NFR BLD: 5.41 K/UL (ref 1.8–7.7)
NRBC BLD-RTO: 0 PER 100 WBC
O2 DELIVERY DEVICE: ABNORMAL
O2 DELIVERY DEVICE: ABNORMAL
PATIENT TEMP: 36.6
PLATELET # BLD AUTO: 130 K/UL (ref 138–453)
PMV BLD AUTO: 12 FL (ref 8.1–13.5)
POC HCO3: 32.7 MMOL/L (ref 21–28)
POC HCO3: 35.1 MMOL/L (ref 21–28)
POC HCO3: 35.3 MMOL/L (ref 21–28)
POC LACTIC ACID: 0.7 MMOL/L (ref 0.56–1.39)
POC O2 SATURATION: 100 % (ref 94–98)
POC O2 SATURATION: 90.8 % (ref 94–98)
POC O2 SATURATION: 95 % (ref 94–98)
POC PCO2: 39 MM HG (ref 35–48)
POC PCO2: 47.8 MM HG (ref 35–48)
POC PCO2: 55.6 MM HG (ref 35–48)
POC PH: 7.41 (ref 7.35–7.45)
POC PH: 7.44 (ref 7.35–7.45)
POC PH: 7.57 (ref 7.35–7.45)
POC PO2: 323.1 MM HG (ref 83–108)
POC PO2: 59.1 MM HG (ref 83–108)
POC PO2: 77.4 MM HG (ref 83–108)
POSITIVE BASE EXCESS, ART: 12.1 MMOL/L (ref 0–3)
POSITIVE BASE EXCESS, ART: 7.5 MMOL/L (ref 0–3)
POSITIVE BASE EXCESS, ART: 8.8 MMOL/L (ref 0–3)
POTASSIUM SERPL-SCNC: 4 MMOL/L (ref 3.7–5.3)
PROT SERPL-MCNC: 5.9 G/DL (ref 6.4–8.3)
RBC # BLD AUTO: 4.04 M/UL (ref 4.21–5.77)
SAMPLE SITE: ABNORMAL
SODIUM SERPL-SCNC: 150 MMOL/L (ref 135–144)
T4 FREE SERPL-MCNC: 0.6 NG/DL (ref 0.9–1.7)
TSH SERPL DL<=0.05 MIU/L-ACNC: 56.96 UIU/ML (ref 0.3–5)
WBC OTHER # BLD: 6.6 K/UL (ref 3.5–11.3)

## 2023-11-02 PROCEDURE — 2500000003 HC RX 250 WO HCPCS

## 2023-11-02 PROCEDURE — 84443 ASSAY THYROID STIM HORMONE: CPT

## 2023-11-02 PROCEDURE — 71045 X-RAY EXAM CHEST 1 VIEW: CPT

## 2023-11-02 PROCEDURE — 6360000002 HC RX W HCPCS: Performed by: STUDENT IN AN ORGANIZED HEALTH CARE EDUCATION/TRAINING PROGRAM

## 2023-11-02 PROCEDURE — 85025 COMPLETE CBC W/AUTO DIFF WBC: CPT

## 2023-11-02 PROCEDURE — 84439 ASSAY OF FREE THYROXINE: CPT

## 2023-11-02 PROCEDURE — 94003 VENT MGMT INPAT SUBQ DAY: CPT

## 2023-11-02 PROCEDURE — 6360000002 HC RX W HCPCS

## 2023-11-02 PROCEDURE — 2700000000 HC OXYGEN THERAPY PER DAY

## 2023-11-02 PROCEDURE — 2580000003 HC RX 258

## 2023-11-02 PROCEDURE — 2000000000 HC ICU R&B

## 2023-11-02 PROCEDURE — 94640 AIRWAY INHALATION TREATMENT: CPT

## 2023-11-02 PROCEDURE — 82947 ASSAY GLUCOSE BLOOD QUANT: CPT

## 2023-11-02 PROCEDURE — 0BH17EZ INSERTION OF ENDOTRACHEAL AIRWAY INTO TRACHEA, VIA NATURAL OR ARTIFICIAL OPENING: ICD-10-PCS | Performed by: INTERNAL MEDICINE

## 2023-11-02 PROCEDURE — 6370000000 HC RX 637 (ALT 250 FOR IP)

## 2023-11-02 PROCEDURE — 5A1955Z RESPIRATORY VENTILATION, GREATER THAN 96 CONSECUTIVE HOURS: ICD-10-PCS | Performed by: INTERNAL MEDICINE

## 2023-11-02 PROCEDURE — 2580000003 HC RX 258: Performed by: STUDENT IN AN ORGANIZED HEALTH CARE EDUCATION/TRAINING PROGRAM

## 2023-11-02 PROCEDURE — 80053 COMPREHEN METABOLIC PANEL: CPT

## 2023-11-02 PROCEDURE — 2580000003 HC RX 258: Performed by: PEDIATRICS

## 2023-11-02 PROCEDURE — 82803 BLOOD GASES ANY COMBINATION: CPT

## 2023-11-02 PROCEDURE — 36600 WITHDRAWAL OF ARTERIAL BLOOD: CPT

## 2023-11-02 PROCEDURE — 83605 ASSAY OF LACTIC ACID: CPT

## 2023-11-02 PROCEDURE — 94761 N-INVAS EAR/PLS OXIMETRY MLT: CPT

## 2023-11-02 PROCEDURE — 6370000000 HC RX 637 (ALT 250 FOR IP): Performed by: STUDENT IN AN ORGANIZED HEALTH CARE EDUCATION/TRAINING PROGRAM

## 2023-11-02 PROCEDURE — 36415 COLL VENOUS BLD VENIPUNCTURE: CPT

## 2023-11-02 PROCEDURE — 2500000003 HC RX 250 WO HCPCS: Performed by: INTERNAL MEDICINE

## 2023-11-02 PROCEDURE — 94660 CPAP INITIATION&MGMT: CPT

## 2023-11-02 RX ORDER — PROPOFOL 10 MG/ML
5-50 INJECTION, EMULSION INTRAVENOUS CONTINUOUS
Status: DISCONTINUED | OUTPATIENT
Start: 2023-11-02 | End: 2023-11-09 | Stop reason: HOSPADM

## 2023-11-02 RX ORDER — LEVOTHYROXINE SODIUM ANHYDROUS 200 UG/5ML
150 INJECTION, POWDER, LYOPHILIZED, FOR SOLUTION INTRAVENOUS DAILY
Status: DISCONTINUED | OUTPATIENT
Start: 2023-11-03 | End: 2023-11-02

## 2023-11-02 RX ORDER — LEVOTHYROXINE SODIUM ANHYDROUS 200 UG/5ML
100 INJECTION, POWDER, LYOPHILIZED, FOR SOLUTION INTRAVENOUS DAILY
Status: DISCONTINUED | OUTPATIENT
Start: 2023-11-02 | End: 2023-11-02

## 2023-11-02 RX ORDER — SODIUM CHLORIDE 0.9 % (FLUSH) 0.9 %
5-40 SYRINGE (ML) INJECTION PRN
Status: DISCONTINUED | OUTPATIENT
Start: 2023-11-02 | End: 2023-11-02 | Stop reason: SDUPTHER

## 2023-11-02 RX ORDER — SODIUM CHLORIDE 0.9 % (FLUSH) 0.9 %
5-40 SYRINGE (ML) INJECTION EVERY 12 HOURS SCHEDULED
Status: DISCONTINUED | OUTPATIENT
Start: 2023-11-02 | End: 2023-11-02 | Stop reason: SDUPTHER

## 2023-11-02 RX ORDER — LEVOTHYROXINE SODIUM ANHYDROUS 200 UG/5ML
150 INJECTION, POWDER, LYOPHILIZED, FOR SOLUTION INTRAVENOUS DAILY
Status: DISCONTINUED | OUTPATIENT
Start: 2023-11-02 | End: 2023-11-03

## 2023-11-02 RX ORDER — IPRATROPIUM BROMIDE AND ALBUTEROL SULFATE 2.5; .5 MG/3ML; MG/3ML
1 SOLUTION RESPIRATORY (INHALATION)
Status: DISCONTINUED | OUTPATIENT
Start: 2023-11-02 | End: 2023-11-05

## 2023-11-02 RX ORDER — SODIUM CHLORIDE 0.9 % (FLUSH) 0.9 %
5-40 SYRINGE (ML) INJECTION EVERY 12 HOURS SCHEDULED
Status: DISCONTINUED | OUTPATIENT
Start: 2023-11-02 | End: 2023-11-04

## 2023-11-02 RX ORDER — SODIUM CHLORIDE 0.9 % (FLUSH) 0.9 %
5-40 SYRINGE (ML) INJECTION PRN
Status: DISCONTINUED | OUTPATIENT
Start: 2023-11-02 | End: 2023-11-09 | Stop reason: HOSPADM

## 2023-11-02 RX ORDER — LIDOCAINE HYDROCHLORIDE 10 MG/ML
5 INJECTION, SOLUTION EPIDURAL; INFILTRATION; INTRACAUDAL; PERINEURAL ONCE
Status: DISCONTINUED | OUTPATIENT
Start: 2023-11-02 | End: 2023-11-03

## 2023-11-02 RX ORDER — LIDOCAINE HYDROCHLORIDE 10 MG/ML
5 INJECTION, SOLUTION EPIDURAL; INFILTRATION; INTRACAUDAL; PERINEURAL ONCE
Status: DISCONTINUED | OUTPATIENT
Start: 2023-11-02 | End: 2023-11-02 | Stop reason: SDUPTHER

## 2023-11-02 RX ORDER — ETOMIDATE 2 MG/ML
0.3 INJECTION INTRAVENOUS ONCE
Status: DISCONTINUED | OUTPATIENT
Start: 2023-11-02 | End: 2023-11-02

## 2023-11-02 RX ORDER — SODIUM CHLORIDE 9 MG/ML
25 INJECTION, SOLUTION INTRAVENOUS PRN
Status: DISCONTINUED | OUTPATIENT
Start: 2023-11-02 | End: 2023-11-02 | Stop reason: SDUPTHER

## 2023-11-02 RX ORDER — ROCURONIUM BROMIDE 10 MG/ML
0.6 INJECTION, SOLUTION INTRAVENOUS ONCE
Status: DISCONTINUED | OUTPATIENT
Start: 2023-11-02 | End: 2023-11-03

## 2023-11-02 RX ORDER — LORAZEPAM 2 MG/ML
1 INJECTION INTRAMUSCULAR ONCE
Status: COMPLETED | OUTPATIENT
Start: 2023-11-02 | End: 2023-11-02

## 2023-11-02 RX ORDER — ETOMIDATE 2 MG/ML
0.3 INJECTION INTRAVENOUS ONCE
Status: COMPLETED | OUTPATIENT
Start: 2023-11-02 | End: 2023-11-02

## 2023-11-02 RX ORDER — BUMETANIDE 0.25 MG/ML
1 INJECTION INTRAMUSCULAR; INTRAVENOUS ONCE
Status: COMPLETED | OUTPATIENT
Start: 2023-11-02 | End: 2023-11-02

## 2023-11-02 RX ORDER — ALBUTEROL SULFATE 2.5 MG/3ML
2.5 SOLUTION RESPIRATORY (INHALATION) EVERY 4 HOURS PRN
Status: DISCONTINUED | OUTPATIENT
Start: 2023-11-02 | End: 2023-11-09 | Stop reason: HOSPADM

## 2023-11-02 RX ORDER — INSULIN GLARGINE 100 [IU]/ML
10 INJECTION, SOLUTION SUBCUTANEOUS DAILY
Status: DISCONTINUED | OUTPATIENT
Start: 2023-11-02 | End: 2023-11-03

## 2023-11-02 RX ORDER — SODIUM CHLORIDE 9 MG/ML
25 INJECTION, SOLUTION INTRAVENOUS PRN
Status: DISCONTINUED | OUTPATIENT
Start: 2023-11-02 | End: 2023-11-09 | Stop reason: HOSPADM

## 2023-11-02 RX ADMIN — PROPOFOL 20 MCG/KG/MIN: 10 INJECTION, EMULSION INTRAVENOUS at 16:01

## 2023-11-02 RX ADMIN — LORAZEPAM 1 MG: 2 INJECTION INTRAMUSCULAR; INTRAVENOUS at 02:46

## 2023-11-02 RX ADMIN — IPRATROPIUM BROMIDE AND ALBUTEROL SULFATE 1 DOSE: 2.5; .5 SOLUTION RESPIRATORY (INHALATION) at 21:52

## 2023-11-02 RX ADMIN — FENTANYL CITRATE 100 MCG: 50 INJECTION, SOLUTION INTRAMUSCULAR; INTRAVENOUS at 01:33

## 2023-11-02 RX ADMIN — HEPARIN SODIUM 7500 UNITS: 5000 INJECTION INTRAVENOUS; SUBCUTANEOUS at 21:59

## 2023-11-02 RX ADMIN — DEXMEDETOMIDINE HYDROCHLORIDE 1.5 MCG/KG/HR: 400 INJECTION, SOLUTION INTRAVENOUS at 10:31

## 2023-11-02 RX ADMIN — DEXMEDETOMIDINE HYDROCHLORIDE 1.5 MCG/KG/HR: 400 INJECTION, SOLUTION INTRAVENOUS at 01:19

## 2023-11-02 RX ADMIN — INSULIN LISPRO 2 UNITS: 100 INJECTION, SOLUTION INTRAVENOUS; SUBCUTANEOUS at 17:29

## 2023-11-02 RX ADMIN — DEXMEDETOMIDINE HYDROCHLORIDE 1.2 MCG/KG/HR: 400 INJECTION, SOLUTION INTRAVENOUS at 04:22

## 2023-11-02 RX ADMIN — INSULIN LISPRO 2 UNITS: 100 INJECTION, SOLUTION INTRAVENOUS; SUBCUTANEOUS at 10:24

## 2023-11-02 RX ADMIN — ALBUTEROL SULFATE 2.5 MG: 2.5 SOLUTION RESPIRATORY (INHALATION) at 21:52

## 2023-11-02 RX ADMIN — DEXTROSE MONOHYDRATE: 50 INJECTION, SOLUTION INTRAVENOUS at 09:49

## 2023-11-02 RX ADMIN — ETOMIDATE 35.8 MG: 2 INJECTION, SOLUTION INTRAVENOUS at 15:51

## 2023-11-02 RX ADMIN — DEXMEDETOMIDINE HYDROCHLORIDE 1.3 MCG/KG/HR: 400 INJECTION, SOLUTION INTRAVENOUS at 13:35

## 2023-11-02 RX ADMIN — INSULIN GLARGINE 10 UNITS: 100 INJECTION, SOLUTION SUBCUTANEOUS at 08:40

## 2023-11-02 RX ADMIN — SODIUM CHLORIDE, PRESERVATIVE FREE 10 ML: 5 INJECTION INTRAVENOUS at 20:24

## 2023-11-02 RX ADMIN — BUMETANIDE 1 MG: 0.25 INJECTION INTRAMUSCULAR; INTRAVENOUS at 12:43

## 2023-11-02 RX ADMIN — INSULIN LISPRO 2 UNITS: 100 INJECTION, SOLUTION INTRAVENOUS; SUBCUTANEOUS at 05:02

## 2023-11-02 RX ADMIN — SODIUM CHLORIDE, PRESERVATIVE FREE 10 ML: 5 INJECTION INTRAVENOUS at 20:25

## 2023-11-02 RX ADMIN — FENTANYL CITRATE 100 MCG: 50 INJECTION, SOLUTION INTRAMUSCULAR; INTRAVENOUS at 21:54

## 2023-11-02 RX ADMIN — LEVOTHYROXINE SODIUM ANHYDROUS 150 MCG: 200 INJECTION, POWDER, LYOPHILIZED, FOR SOLUTION INTRAVENOUS at 12:43

## 2023-11-02 RX ADMIN — PROPOFOL 15 MCG/KG/MIN: 10 INJECTION, EMULSION INTRAVENOUS at 20:00

## 2023-11-02 RX ADMIN — DEXMEDETOMIDINE HYDROCHLORIDE 1.5 MCG/KG/HR: 400 INJECTION, SOLUTION INTRAVENOUS at 05:59

## 2023-11-02 RX ADMIN — DEXMEDETOMIDINE HYDROCHLORIDE 1.5 MCG/KG/HR: 400 INJECTION, SOLUTION INTRAVENOUS at 02:44

## 2023-11-02 RX ADMIN — METHYLPREDNISOLONE SODIUM SUCCINATE 40 MG: 40 INJECTION, POWDER, FOR SOLUTION INTRAMUSCULAR; INTRAVENOUS at 11:08

## 2023-11-02 RX ADMIN — HEPARIN SODIUM 7500 UNITS: 5000 INJECTION INTRAVENOUS; SUBCUTANEOUS at 05:30

## 2023-11-02 RX ADMIN — HEPARIN SODIUM 7500 UNITS: 5000 INJECTION INTRAVENOUS; SUBCUTANEOUS at 14:18

## 2023-11-02 RX ADMIN — DEXMEDETOMIDINE HYDROCHLORIDE 1.5 MCG/KG/HR: 400 INJECTION, SOLUTION INTRAVENOUS at 11:57

## 2023-11-02 RX ADMIN — INSULIN LISPRO 2 UNITS: 100 INJECTION, SOLUTION INTRAVENOUS; SUBCUTANEOUS at 22:03

## 2023-11-02 RX ADMIN — SODIUM CHLORIDE, PRESERVATIVE FREE 10 ML: 5 INJECTION INTRAVENOUS at 20:36

## 2023-11-02 RX ADMIN — DEXMEDETOMIDINE HYDROCHLORIDE 1.5 MCG/KG/HR: 400 INJECTION, SOLUTION INTRAVENOUS at 07:36

## 2023-11-02 RX ADMIN — DEXMEDETOMIDINE HYDROCHLORIDE 1.5 MCG/KG/HR: 400 INJECTION, SOLUTION INTRAVENOUS at 09:03

## 2023-11-02 RX ADMIN — FENTANYL CITRATE 100 MCG: 50 INJECTION, SOLUTION INTRAMUSCULAR; INTRAVENOUS at 06:28

## 2023-11-02 ASSESSMENT — PULMONARY FUNCTION TESTS
PIF_VALUE: 21
PIF_VALUE: 23
PIF_VALUE: 53
PIF_VALUE: 22
PIF_VALUE: 29

## 2023-11-02 NOTE — PROCEDURES
PROCEDURE NOTE - EMERGENCY INTUBATION    [facility] REC[facility] NO. [id number]  DATE: 11/2/2023  ATTENDING PHYSICIAN: Dr. Lanre Mathias DIAGNOSIS:  Acute Respiratory Failure  POSTOPERATIVE DIAGNOSIS:  Same  PROCEDURE PERFORMED:  Emergency endotracheal intubation  PERFORMING PHYSICIAN: Arsalan Bello MD    MEDICATIONS: etomidate intravenously    DISCUSSION:  Chrissy Paniagua is a 60y.o.-year-old male who requires intubation and ventilatory support due to hypoxia not amenable to BiPAP. The history and physical examination were reviewed and confirmed. CONSENT: Unable to be obtained due to patient's condition. PROCEDURE:  A timeout was initiated by the bedside nurse and was confirmed by those present. Anesthesia was called bedside to be on standby as patient has been intubated previously and has been on BiPAP now for 6 days  The patient was placed in the appropriate position. Cricoid pressure was not required. Intubation was performed by direct laryngoscopy using a laryngoscope and a 7.5 cuffed endotracheal tube. The cuff was then inflated and the tube was secured appropriately at a distance of 28 cm to the dental ridge. Initial confirmation of placement included bilateral breath sounds, an end tidal CO2 detector, absence of sounds over the stomach, and tube fogging. A chest x-ray to verify correct placement of the tube showed appropriate tube position. The patient tolerated the procedure well.      COMPLICATIONS:  None     Arsalan Bello MD  5:24 PM, 11/2/23

## 2023-11-03 ENCOUNTER — APPOINTMENT (OUTPATIENT)
Dept: GENERAL RADIOLOGY | Age: 58
DRG: 004 | End: 2023-11-03
Payer: MEDICARE

## 2023-11-03 LAB
ALBUMIN SERPL-MCNC: 2.6 G/DL (ref 3.5–5.2)
ALBUMIN/GLOB SERPL: 0.8 {RATIO} (ref 1–2.5)
ALP SERPL-CCNC: 103 U/L (ref 40–129)
ALT SERPL-CCNC: 24 U/L (ref 5–41)
ANION GAP SERPL CALCULATED.3IONS-SCNC: 13 MMOL/L (ref 9–17)
AST SERPL-CCNC: 30 U/L
BASOPHILS # BLD: 0 K/UL (ref 0–0.2)
BASOPHILS NFR BLD: 0 % (ref 0–2)
BILIRUB SERPL-MCNC: 0.7 MG/DL (ref 0.3–1.2)
BUN SERPL-MCNC: 21 MG/DL (ref 6–20)
CALCIUM SERPL-MCNC: 8.3 MG/DL (ref 8.6–10.4)
CHLORIDE SERPL-SCNC: 109 MMOL/L (ref 98–107)
CO2 SERPL-SCNC: 28 MMOL/L (ref 20–31)
CREAT SERPL-MCNC: 1.3 MG/DL (ref 0.7–1.2)
EOSINOPHIL # BLD: 0 K/UL (ref 0–0.4)
EOSINOPHILS RELATIVE PERCENT: 0 % (ref 1–4)
ERYTHROCYTE [DISTWIDTH] IN BLOOD BY AUTOMATED COUNT: 19.7 % (ref 11.8–14.4)
FIO2: 35
GFR SERPL CREATININE-BSD FRML MDRD: >60 ML/MIN/1.73M2
GLUCOSE BLD-MCNC: 102 MG/DL (ref 75–110)
GLUCOSE BLD-MCNC: 124 MG/DL (ref 75–110)
GLUCOSE BLD-MCNC: 155 MG/DL (ref 75–110)
GLUCOSE BLD-MCNC: 157 MG/DL (ref 75–110)
GLUCOSE BLD-MCNC: 240 MG/DL (ref 74–100)
GLUCOSE BLD-MCNC: 92 MG/DL (ref 75–110)
GLUCOSE SERPL-MCNC: 233 MG/DL (ref 70–99)
HCT VFR BLD AUTO: 34.3 % (ref 40.7–50.3)
HGB BLD-MCNC: 10.3 G/DL (ref 13–17)
IMM GRANULOCYTES # BLD AUTO: 0.36 K/UL (ref 0–0.3)
IMM GRANULOCYTES NFR BLD: 4 %
LYMPHOCYTES NFR BLD: 0.46 K/UL (ref 1–4.8)
LYMPHOCYTES RELATIVE PERCENT: 5 % (ref 24–44)
MCH RBC QN AUTO: 27.3 PG (ref 25.2–33.5)
MCHC RBC AUTO-ENTMCNC: 30 G/DL (ref 28.4–34.8)
MCV RBC AUTO: 91 FL (ref 82.6–102.9)
MONOCYTES NFR BLD: 0.09 K/UL (ref 0.1–0.8)
MONOCYTES NFR BLD: 1 % (ref 1–7)
MORPHOLOGY: ABNORMAL
NEUTROPHILS NFR BLD: 90 % (ref 36–66)
NEUTS SEG NFR BLD: 8.19 K/UL (ref 1.8–7.7)
NRBC BLD-RTO: 0.4 PER 100 WBC
PATIENT TEMP: 36.4
PLATELET # BLD AUTO: 565 K/UL (ref 138–453)
PMV BLD AUTO: 12.5 FL (ref 8.1–13.5)
POC HCO3: 33.7 MMOL/L (ref 21–28)
POC O2 SATURATION: 93.7 % (ref 94–98)
POC PCO2: 43.4 MM HG (ref 35–48)
POC PH: 7.5 (ref 7.35–7.45)
POC PO2: 64.2 MM HG (ref 83–108)
POSITIVE BASE EXCESS, ART: 9.4 MMOL/L (ref 0–3)
POTASSIUM SERPL-SCNC: 4 MMOL/L (ref 3.7–5.3)
PROT SERPL-MCNC: 5.7 G/DL (ref 6.4–8.3)
RBC # BLD AUTO: 3.77 M/UL (ref 4.21–5.77)
SAMPLE SITE: ABNORMAL
SODIUM SERPL-SCNC: 150 MMOL/L (ref 135–144)
WBC OTHER # BLD: 9.1 K/UL (ref 3.5–11.3)

## 2023-11-03 PROCEDURE — 6370000000 HC RX 637 (ALT 250 FOR IP): Performed by: STUDENT IN AN ORGANIZED HEALTH CARE EDUCATION/TRAINING PROGRAM

## 2023-11-03 PROCEDURE — 6370000000 HC RX 637 (ALT 250 FOR IP)

## 2023-11-03 PROCEDURE — 6360000002 HC RX W HCPCS

## 2023-11-03 PROCEDURE — 82947 ASSAY GLUCOSE BLOOD QUANT: CPT

## 2023-11-03 PROCEDURE — 2500000003 HC RX 250 WO HCPCS

## 2023-11-03 PROCEDURE — 74018 RADEX ABDOMEN 1 VIEW: CPT

## 2023-11-03 PROCEDURE — 36415 COLL VENOUS BLD VENIPUNCTURE: CPT

## 2023-11-03 PROCEDURE — 85025 COMPLETE CBC W/AUTO DIFF WBC: CPT

## 2023-11-03 PROCEDURE — 6360000002 HC RX W HCPCS: Performed by: STUDENT IN AN ORGANIZED HEALTH CARE EDUCATION/TRAINING PROGRAM

## 2023-11-03 PROCEDURE — 82803 BLOOD GASES ANY COMBINATION: CPT

## 2023-11-03 PROCEDURE — 2580000003 HC RX 258

## 2023-11-03 PROCEDURE — 2000000000 HC ICU R&B

## 2023-11-03 PROCEDURE — 94761 N-INVAS EAR/PLS OXIMETRY MLT: CPT

## 2023-11-03 PROCEDURE — 2580000003 HC RX 258: Performed by: STUDENT IN AN ORGANIZED HEALTH CARE EDUCATION/TRAINING PROGRAM

## 2023-11-03 PROCEDURE — 2500000003 HC RX 250 WO HCPCS: Performed by: STUDENT IN AN ORGANIZED HEALTH CARE EDUCATION/TRAINING PROGRAM

## 2023-11-03 PROCEDURE — 36600 WITHDRAWAL OF ARTERIAL BLOOD: CPT

## 2023-11-03 PROCEDURE — 99221 1ST HOSP IP/OBS SF/LOW 40: CPT | Performed by: SURGERY

## 2023-11-03 PROCEDURE — 94003 VENT MGMT INPAT SUBQ DAY: CPT

## 2023-11-03 PROCEDURE — 80053 COMPREHEN METABOLIC PANEL: CPT

## 2023-11-03 PROCEDURE — 94640 AIRWAY INHALATION TREATMENT: CPT

## 2023-11-03 PROCEDURE — 2700000000 HC OXYGEN THERAPY PER DAY

## 2023-11-03 PROCEDURE — 2580000003 HC RX 258: Performed by: PEDIATRICS

## 2023-11-03 RX ORDER — BUMETANIDE 0.25 MG/ML
1 INJECTION INTRAMUSCULAR; INTRAVENOUS ONCE
Status: COMPLETED | OUTPATIENT
Start: 2023-11-03 | End: 2023-11-03

## 2023-11-03 RX ORDER — CLONAZEPAM 1 MG/1
1 TABLET ORAL EVERY 12 HOURS
Status: DISCONTINUED | OUTPATIENT
Start: 2023-11-04 | End: 2023-11-03

## 2023-11-03 RX ORDER — DEXMEDETOMIDINE HYDROCHLORIDE 4 UG/ML
.1-1.5 INJECTION, SOLUTION INTRAVENOUS CONTINUOUS
Status: DISCONTINUED | OUTPATIENT
Start: 2023-11-03 | End: 2023-11-04

## 2023-11-03 RX ORDER — LEVOTHYROXINE SODIUM 0.1 MG/1
200 TABLET ORAL DAILY
Status: DISCONTINUED | OUTPATIENT
Start: 2023-11-03 | End: 2023-11-09 | Stop reason: HOSPADM

## 2023-11-03 RX ORDER — FENTANYL CITRATE 50 UG/ML
100 INJECTION, SOLUTION INTRAMUSCULAR; INTRAVENOUS ONCE
Status: COMPLETED | OUTPATIENT
Start: 2023-11-03 | End: 2023-11-03

## 2023-11-03 RX ORDER — TRAZODONE HYDROCHLORIDE 50 MG/1
75 TABLET ORAL NIGHTLY
Status: DISCONTINUED | OUTPATIENT
Start: 2023-11-03 | End: 2023-11-03

## 2023-11-03 RX ORDER — CLONAZEPAM 1 MG/1
1 TABLET ORAL EVERY 12 HOURS
Status: DISCONTINUED | OUTPATIENT
Start: 2023-11-04 | End: 2023-11-09 | Stop reason: HOSPADM

## 2023-11-03 RX ORDER — INSULIN GLARGINE 100 [IU]/ML
12 INJECTION, SOLUTION SUBCUTANEOUS DAILY
Status: DISCONTINUED | OUTPATIENT
Start: 2023-11-03 | End: 2023-11-09 | Stop reason: HOSPADM

## 2023-11-03 RX ORDER — MIDAZOLAM HYDROCHLORIDE 2 MG/2ML
2 INJECTION, SOLUTION INTRAMUSCULAR; INTRAVENOUS ONCE
Status: COMPLETED | OUTPATIENT
Start: 2023-11-04 | End: 2023-11-04

## 2023-11-03 RX ORDER — TRAZODONE HYDROCHLORIDE 50 MG/1
75 TABLET ORAL NIGHTLY
Status: DISCONTINUED | OUTPATIENT
Start: 2023-11-03 | End: 2023-11-09 | Stop reason: HOSPADM

## 2023-11-03 RX ADMIN — PROPOFOL 30 MCG/KG/MIN: 10 INJECTION, EMULSION INTRAVENOUS at 06:01

## 2023-11-03 RX ADMIN — DEXMEDETOMIDINE HYDROCHLORIDE 1.5 MCG/KG/HR: 400 INJECTION, SOLUTION INTRAVENOUS at 23:35

## 2023-11-03 RX ADMIN — LEVOTHYROXINE SODIUM 200 MCG: 100 TABLET ORAL at 12:40

## 2023-11-03 RX ADMIN — PROPOFOL 10 MCG/KG/MIN: 10 INJECTION, EMULSION INTRAVENOUS at 10:55

## 2023-11-03 RX ADMIN — SODIUM CHLORIDE, PRESERVATIVE FREE 10 ML: 5 INJECTION INTRAVENOUS at 10:18

## 2023-11-03 RX ADMIN — DOCUSATE SODIUM LIQUID 100 MG: 100 LIQUID ORAL at 12:42

## 2023-11-03 RX ADMIN — SODIUM CHLORIDE, PRESERVATIVE FREE 10 ML: 5 INJECTION INTRAVENOUS at 23:39

## 2023-11-03 RX ADMIN — ASPIRIN 81 MG: 81 TABLET, CHEWABLE ORAL at 12:40

## 2023-11-03 RX ADMIN — Medication 50 MCG/HR: at 21:52

## 2023-11-03 RX ADMIN — DEXMEDETOMIDINE HYDROCHLORIDE 1 MCG/KG/HR: 400 INJECTION, SOLUTION INTRAVENOUS at 21:07

## 2023-11-03 RX ADMIN — FENTANYL CITRATE 100 MCG: 50 INJECTION, SOLUTION INTRAMUSCULAR; INTRAVENOUS at 03:56

## 2023-11-03 RX ADMIN — TRAZODONE HYDROCHLORIDE 75 MG: 50 TABLET ORAL at 21:23

## 2023-11-03 RX ADMIN — AMIODARONE HYDROCHLORIDE 200 MG: 200 TABLET ORAL at 12:40

## 2023-11-03 RX ADMIN — FENTANYL CITRATE 100 MCG: 50 INJECTION, SOLUTION INTRAMUSCULAR; INTRAVENOUS at 20:38

## 2023-11-03 RX ADMIN — SODIUM CHLORIDE, PRESERVATIVE FREE 10 ML: 5 INJECTION INTRAVENOUS at 20:38

## 2023-11-03 RX ADMIN — IPRATROPIUM BROMIDE AND ALBUTEROL SULFATE 1 DOSE: 2.5; .5 SOLUTION RESPIRATORY (INHALATION) at 03:48

## 2023-11-03 RX ADMIN — HEPARIN SODIUM 7500 UNITS: 5000 INJECTION INTRAVENOUS; SUBCUTANEOUS at 13:30

## 2023-11-03 RX ADMIN — DOCUSATE SODIUM LIQUID 100 MG: 100 LIQUID ORAL at 21:22

## 2023-11-03 RX ADMIN — FAMOTIDINE 20 MG: 20 TABLET, FILM COATED ORAL at 12:40

## 2023-11-03 RX ADMIN — FAMOTIDINE 20 MG: 20 TABLET, FILM COATED ORAL at 21:22

## 2023-11-03 RX ADMIN — FENTANYL CITRATE 100 MCG: 50 INJECTION, SOLUTION INTRAMUSCULAR; INTRAVENOUS at 08:17

## 2023-11-03 RX ADMIN — AMIODARONE HYDROCHLORIDE 200 MG: 200 TABLET ORAL at 21:22

## 2023-11-03 RX ADMIN — DEXMEDETOMIDINE HYDROCHLORIDE 1.5 MCG/KG/HR: 400 INJECTION, SOLUTION INTRAVENOUS at 22:22

## 2023-11-03 RX ADMIN — SODIUM CHLORIDE, PRESERVATIVE FREE 10 ML: 5 INJECTION INTRAVENOUS at 23:38

## 2023-11-03 RX ADMIN — IPRATROPIUM BROMIDE AND ALBUTEROL SULFATE 1 DOSE: 2.5; .5 SOLUTION RESPIRATORY (INHALATION) at 08:33

## 2023-11-03 RX ADMIN — ALBUTEROL SULFATE 2.5 MG: 2.5 SOLUTION RESPIRATORY (INHALATION) at 03:48

## 2023-11-03 RX ADMIN — IPRATROPIUM BROMIDE AND ALBUTEROL SULFATE 1 DOSE: 2.5; .5 SOLUTION RESPIRATORY (INHALATION) at 21:36

## 2023-11-03 RX ADMIN — CITALOPRAM HYDROBROMIDE 30 MG: 10 TABLET ORAL at 14:21

## 2023-11-03 RX ADMIN — DEXMEDETOMIDINE HYDROCHLORIDE 0.2 MCG/KG/HR: 400 INJECTION, SOLUTION INTRAVENOUS at 16:45

## 2023-11-03 RX ADMIN — MIDODRINE HYDROCHLORIDE 10 MG: 5 TABLET ORAL at 12:40

## 2023-11-03 RX ADMIN — MIDODRINE HYDROCHLORIDE 10 MG: 5 TABLET ORAL at 18:36

## 2023-11-03 RX ADMIN — INSULIN GLARGINE 12 UNITS: 100 INJECTION, SOLUTION SUBCUTANEOUS at 10:17

## 2023-11-03 RX ADMIN — FENTANYL CITRATE 100 MCG: 50 INJECTION, SOLUTION INTRAMUSCULAR; INTRAVENOUS at 16:58

## 2023-11-03 RX ADMIN — HEPARIN SODIUM 7500 UNITS: 5000 INJECTION INTRAVENOUS; SUBCUTANEOUS at 05:28

## 2023-11-03 RX ADMIN — PROPOFOL 25 MCG/KG/MIN: 10 INJECTION, EMULSION INTRAVENOUS at 01:07

## 2023-11-03 RX ADMIN — HEPARIN SODIUM 7500 UNITS: 5000 INJECTION INTRAVENOUS; SUBCUTANEOUS at 21:22

## 2023-11-03 RX ADMIN — FENTANYL CITRATE 100 MCG: 50 INJECTION, SOLUTION INTRAMUSCULAR; INTRAVENOUS at 22:22

## 2023-11-03 RX ADMIN — BUMETANIDE 1 MG: 0.25 INJECTION INTRAMUSCULAR; INTRAVENOUS at 12:41

## 2023-11-03 ASSESSMENT — PULMONARY FUNCTION TESTS
PIF_VALUE: 27
PIF_VALUE: 29
PIF_VALUE: 34
PIF_VALUE: 31
PIF_VALUE: 31
PIF_VALUE: 28
PIF_VALUE: 27

## 2023-11-03 ASSESSMENT — PAIN SCALES - GENERAL: PAINLEVEL_OUTOF10: 5

## 2023-11-04 ENCOUNTER — APPOINTMENT (OUTPATIENT)
Dept: GENERAL RADIOLOGY | Age: 58
DRG: 004 | End: 2023-11-04
Payer: MEDICARE

## 2023-11-04 PROBLEM — E87.8 REFEEDING SYNDROME: Status: ACTIVE | Noted: 2023-11-04

## 2023-11-04 LAB
ALBUMIN SERPL-MCNC: 2.4 G/DL (ref 3.5–5.2)
ALBUMIN/GLOB SERPL: 1 {RATIO} (ref 1–2.5)
ALP SERPL-CCNC: 106 U/L (ref 40–129)
ALT SERPL-CCNC: 19 U/L (ref 5–41)
ANION GAP SERPL CALCULATED.3IONS-SCNC: 7 MMOL/L (ref 9–17)
AST SERPL-CCNC: 17 U/L
BASOPHILS # BLD: 0 K/UL (ref 0–0.2)
BASOPHILS NFR BLD: 0 % (ref 0–2)
BILIRUB SERPL-MCNC: 0.6 MG/DL (ref 0.3–1.2)
BUN SERPL-MCNC: 18 MG/DL (ref 6–20)
CALCIUM SERPL-MCNC: 7.9 MG/DL (ref 8.6–10.4)
CHLORIDE SERPL-SCNC: 112 MMOL/L (ref 98–107)
CO2 SERPL-SCNC: 33 MMOL/L (ref 20–31)
CREAT SERPL-MCNC: 1.5 MG/DL (ref 0.7–1.2)
EOSINOPHIL # BLD: 0.18 K/UL (ref 0–0.44)
EOSINOPHILS RELATIVE PERCENT: 3 % (ref 1–4)
ERYTHROCYTE [DISTWIDTH] IN BLOOD BY AUTOMATED COUNT: 18.7 % (ref 11.8–14.4)
FIO2: 30
GFR SERPL CREATININE-BSD FRML MDRD: 53 ML/MIN/1.73M2
GLUCOSE BLD-MCNC: 100 MG/DL (ref 75–110)
GLUCOSE BLD-MCNC: 100 MG/DL (ref 75–110)
GLUCOSE BLD-MCNC: 108 MG/DL (ref 75–110)
GLUCOSE BLD-MCNC: 116 MG/DL (ref 75–110)
GLUCOSE BLD-MCNC: 124 MG/DL (ref 74–100)
GLUCOSE BLD-MCNC: 65 MG/DL (ref 75–110)
GLUCOSE BLD-MCNC: 69 MG/DL (ref 75–110)
GLUCOSE SERPL-MCNC: 118 MG/DL (ref 70–99)
HCT VFR BLD AUTO: 31.2 % (ref 40.7–50.3)
HGB BLD-MCNC: 8.6 G/DL (ref 13–17)
IMM GRANULOCYTES # BLD AUTO: 0.18 K/UL (ref 0–0.3)
IMM GRANULOCYTES NFR BLD: 3 %
LYMPHOCYTES NFR BLD: 1.28 K/UL (ref 1.1–3.7)
LYMPHOCYTES RELATIVE PERCENT: 21 % (ref 24–43)
MAGNESIUM SERPL-MCNC: 2.1 MG/DL (ref 1.6–2.6)
MCH RBC QN AUTO: 25.4 PG (ref 25.2–33.5)
MCHC RBC AUTO-ENTMCNC: 27.6 G/DL (ref 28.4–34.8)
MCV RBC AUTO: 92.3 FL (ref 82.6–102.9)
MONOCYTES NFR BLD: 0.43 K/UL (ref 0.1–1.2)
MONOCYTES NFR BLD: 7 % (ref 3–12)
MORPHOLOGY: ABNORMAL
NEUTROPHILS NFR BLD: 66 % (ref 36–65)
NEUTS SEG NFR BLD: 4.03 K/UL (ref 1.5–8.1)
NRBC BLD-RTO: 0 PER 100 WBC
PHOSPHATE SERPL-MCNC: 2 MG/DL (ref 2.5–4.5)
PHOSPHATE SERPL-MCNC: 3.3 MG/DL (ref 2.5–4.5)
PLATELET # BLD AUTO: ABNORMAL K/UL (ref 138–453)
PLATELET, FLUORESCENCE: 110 K/UL (ref 138–453)
PLATELETS.RETICULATED NFR BLD AUTO: 5.3 % (ref 1.1–10.3)
POC HCO3: 38 MMOL/L (ref 21–28)
POC O2 SATURATION: 92.3 % (ref 94–98)
POC PCO2: 55.7 MM HG (ref 35–48)
POC PH: 7.44 (ref 7.35–7.45)
POC PO2: 64.2 MM HG (ref 83–108)
POSITIVE BASE EXCESS, ART: 12.2 MMOL/L (ref 0–3)
POTASSIUM SERPL-SCNC: 2.8 MMOL/L (ref 3.7–5.3)
POTASSIUM SERPL-SCNC: 3.5 MMOL/L (ref 3.7–5.3)
PROT SERPL-MCNC: 4.7 G/DL (ref 6.4–8.3)
RBC # BLD AUTO: 3.38 M/UL (ref 4.21–5.77)
SODIUM SERPL-SCNC: 152 MMOL/L (ref 135–144)
TRIGL SERPL-MCNC: 139 MG/DL
WBC OTHER # BLD: 6.1 K/UL (ref 3.5–11.3)

## 2023-11-04 PROCEDURE — 2000000000 HC ICU R&B

## 2023-11-04 PROCEDURE — 6360000002 HC RX W HCPCS: Performed by: STUDENT IN AN ORGANIZED HEALTH CARE EDUCATION/TRAINING PROGRAM

## 2023-11-04 PROCEDURE — 6370000000 HC RX 637 (ALT 250 FOR IP): Performed by: STUDENT IN AN ORGANIZED HEALTH CARE EDUCATION/TRAINING PROGRAM

## 2023-11-04 PROCEDURE — 84478 ASSAY OF TRIGLYCERIDES: CPT

## 2023-11-04 PROCEDURE — 6370000000 HC RX 637 (ALT 250 FOR IP)

## 2023-11-04 PROCEDURE — 2700000000 HC OXYGEN THERAPY PER DAY

## 2023-11-04 PROCEDURE — 94003 VENT MGMT INPAT SUBQ DAY: CPT

## 2023-11-04 PROCEDURE — 71045 X-RAY EXAM CHEST 1 VIEW: CPT

## 2023-11-04 PROCEDURE — 6360000002 HC RX W HCPCS

## 2023-11-04 PROCEDURE — 2500000003 HC RX 250 WO HCPCS

## 2023-11-04 PROCEDURE — 82947 ASSAY GLUCOSE BLOOD QUANT: CPT

## 2023-11-04 PROCEDURE — 93005 ELECTROCARDIOGRAM TRACING: CPT

## 2023-11-04 PROCEDURE — 82803 BLOOD GASES ANY COMBINATION: CPT

## 2023-11-04 PROCEDURE — 36415 COLL VENOUS BLD VENIPUNCTURE: CPT

## 2023-11-04 PROCEDURE — 94640 AIRWAY INHALATION TREATMENT: CPT

## 2023-11-04 PROCEDURE — 84132 ASSAY OF SERUM POTASSIUM: CPT

## 2023-11-04 PROCEDURE — 85025 COMPLETE CBC W/AUTO DIFF WBC: CPT

## 2023-11-04 PROCEDURE — 80053 COMPREHEN METABOLIC PANEL: CPT

## 2023-11-04 PROCEDURE — 94761 N-INVAS EAR/PLS OXIMETRY MLT: CPT

## 2023-11-04 PROCEDURE — 2500000003 HC RX 250 WO HCPCS: Performed by: STUDENT IN AN ORGANIZED HEALTH CARE EDUCATION/TRAINING PROGRAM

## 2023-11-04 PROCEDURE — 85055 RETICULATED PLATELET ASSAY: CPT

## 2023-11-04 PROCEDURE — 84100 ASSAY OF PHOSPHORUS: CPT

## 2023-11-04 PROCEDURE — 83735 ASSAY OF MAGNESIUM: CPT

## 2023-11-04 PROCEDURE — 2580000003 HC RX 258: Performed by: STUDENT IN AN ORGANIZED HEALTH CARE EDUCATION/TRAINING PROGRAM

## 2023-11-04 PROCEDURE — 36600 WITHDRAWAL OF ARTERIAL BLOOD: CPT

## 2023-11-04 PROCEDURE — 2580000003 HC RX 258

## 2023-11-04 RX ORDER — BUMETANIDE 0.25 MG/ML
1 INJECTION INTRAMUSCULAR; INTRAVENOUS ONCE
Status: COMPLETED | OUTPATIENT
Start: 2023-11-04 | End: 2023-11-04

## 2023-11-04 RX ORDER — METOCLOPRAMIDE 10 MG/1
10 TABLET ORAL ONCE
Status: COMPLETED | OUTPATIENT
Start: 2023-11-04 | End: 2023-11-04

## 2023-11-04 RX ORDER — BUMETANIDE 0.25 MG/ML
0.5 INJECTION INTRAMUSCULAR; INTRAVENOUS ONCE
Status: DISCONTINUED | OUTPATIENT
Start: 2023-11-04 | End: 2023-11-05

## 2023-11-04 RX ORDER — MIDODRINE HYDROCHLORIDE 5 MG/1
15 TABLET ORAL EVERY 6 HOURS
Status: DISCONTINUED | OUTPATIENT
Start: 2023-11-04 | End: 2023-11-09 | Stop reason: HOSPADM

## 2023-11-04 RX ADMIN — POTASSIUM CHLORIDE 10 MEQ: 7.46 INJECTION, SOLUTION INTRAVENOUS at 05:21

## 2023-11-04 RX ADMIN — CITALOPRAM HYDROBROMIDE 30 MG: 10 TABLET ORAL at 09:04

## 2023-11-04 RX ADMIN — SODIUM CHLORIDE, PRESERVATIVE FREE 10 ML: 5 INJECTION INTRAVENOUS at 09:10

## 2023-11-04 RX ADMIN — POTASSIUM CHLORIDE 10 MEQ: 7.46 INJECTION, SOLUTION INTRAVENOUS at 09:08

## 2023-11-04 RX ADMIN — Medication 150 MCG/HR: at 23:44

## 2023-11-04 RX ADMIN — MIDODRINE HYDROCHLORIDE 15 MG: 5 TABLET ORAL at 21:41

## 2023-11-04 RX ADMIN — MIDODRINE HYDROCHLORIDE 15 MG: 5 TABLET ORAL at 13:12

## 2023-11-04 RX ADMIN — ONDANSETRON 4 MG: 2 INJECTION INTRAMUSCULAR; INTRAVENOUS at 09:24

## 2023-11-04 RX ADMIN — Medication 175 MCG/HR: at 05:59

## 2023-11-04 RX ADMIN — IPRATROPIUM BROMIDE AND ALBUTEROL SULFATE 1 DOSE: 2.5; .5 SOLUTION RESPIRATORY (INHALATION) at 07:54

## 2023-11-04 RX ADMIN — POTASSIUM PHOSPHATE, MONOBASIC POTASSIUM PHOSPHATE, DIBASIC 30 MMOL: 224; 236 INJECTION, SOLUTION, CONCENTRATE INTRAVENOUS at 13:31

## 2023-11-04 RX ADMIN — DEXTROSE MONOHYDRATE 250 ML: 100 INJECTION, SOLUTION INTRAVENOUS at 11:35

## 2023-11-04 RX ADMIN — MIDODRINE HYDROCHLORIDE 10 MG: 5 TABLET ORAL at 09:06

## 2023-11-04 RX ADMIN — HEPARIN SODIUM 7500 UNITS: 5000 INJECTION INTRAVENOUS; SUBCUTANEOUS at 21:41

## 2023-11-04 RX ADMIN — MIDODRINE HYDROCHLORIDE 15 MG: 5 TABLET ORAL at 23:56

## 2023-11-04 RX ADMIN — CLONAZEPAM 1 MG: 1 TABLET ORAL at 00:09

## 2023-11-04 RX ADMIN — POTASSIUM CHLORIDE 10 MEQ: 7.46 INJECTION, SOLUTION INTRAVENOUS at 10:54

## 2023-11-04 RX ADMIN — MIDAZOLAM HYDROCHLORIDE 2 MG: 1 INJECTION, SOLUTION INTRAMUSCULAR; INTRAVENOUS at 00:07

## 2023-11-04 RX ADMIN — BUMETANIDE 1 MG: 0.25 INJECTION INTRAMUSCULAR; INTRAVENOUS at 15:57

## 2023-11-04 RX ADMIN — AMIODARONE HYDROCHLORIDE 200 MG: 200 TABLET ORAL at 21:41

## 2023-11-04 RX ADMIN — FENTANYL CITRATE 100 MCG: 50 INJECTION, SOLUTION INTRAMUSCULAR; INTRAVENOUS at 23:44

## 2023-11-04 RX ADMIN — LEVOTHYROXINE SODIUM 200 MCG: 100 TABLET ORAL at 09:52

## 2023-11-04 RX ADMIN — METOCLOPRAMIDE 10 MG: 10 TABLET ORAL at 09:53

## 2023-11-04 RX ADMIN — CLONAZEPAM 1 MG: 1 TABLET ORAL at 14:12

## 2023-11-04 RX ADMIN — IPRATROPIUM BROMIDE AND ALBUTEROL SULFATE 1 DOSE: 2.5; .5 SOLUTION RESPIRATORY (INHALATION) at 13:02

## 2023-11-04 RX ADMIN — MIDODRINE HYDROCHLORIDE 10 MG: 5 TABLET ORAL at 00:09

## 2023-11-04 RX ADMIN — FAMOTIDINE 20 MG: 20 TABLET, FILM COATED ORAL at 09:05

## 2023-11-04 RX ADMIN — AMIODARONE HYDROCHLORIDE 200 MG: 200 TABLET ORAL at 09:03

## 2023-11-04 RX ADMIN — PROPOFOL 5 MCG/KG/MIN: 10 INJECTION, EMULSION INTRAVENOUS at 08:30

## 2023-11-04 RX ADMIN — DOCUSATE SODIUM LIQUID 100 MG: 100 LIQUID ORAL at 21:41

## 2023-11-04 RX ADMIN — FAMOTIDINE 20 MG: 20 TABLET, FILM COATED ORAL at 21:41

## 2023-11-04 RX ADMIN — DEXTROSE MONOHYDRATE 250 ML: 100 INJECTION, SOLUTION INTRAVENOUS at 11:11

## 2023-11-04 RX ADMIN — DEXMEDETOMIDINE HYDROCHLORIDE 1.5 MCG/KG/HR: 400 INJECTION, SOLUTION INTRAVENOUS at 01:15

## 2023-11-04 RX ADMIN — TRAZODONE HYDROCHLORIDE 75 MG: 50 TABLET ORAL at 21:41

## 2023-11-04 RX ADMIN — IPRATROPIUM BROMIDE AND ALBUTEROL SULFATE 1 DOSE: 2.5; .5 SOLUTION RESPIRATORY (INHALATION) at 19:50

## 2023-11-04 RX ADMIN — HEPARIN SODIUM 7500 UNITS: 5000 INJECTION INTRAVENOUS; SUBCUTANEOUS at 06:12

## 2023-11-04 RX ADMIN — SODIUM CHLORIDE, PRESERVATIVE FREE 10 ML: 5 INJECTION INTRAVENOUS at 21:42

## 2023-11-04 RX ADMIN — ASPIRIN 81 MG: 81 TABLET, CHEWABLE ORAL at 09:03

## 2023-11-04 RX ADMIN — PROPOFOL 20 MCG/KG/MIN: 10 INJECTION, EMULSION INTRAVENOUS at 01:13

## 2023-11-04 RX ADMIN — DOCUSATE SODIUM LIQUID 100 MG: 100 LIQUID ORAL at 09:04

## 2023-11-04 RX ADMIN — POTASSIUM CHLORIDE 10 MEQ: 7.46 INJECTION, SOLUTION INTRAVENOUS at 07:33

## 2023-11-04 RX ADMIN — CLONAZEPAM 1 MG: 1 TABLET ORAL at 23:56

## 2023-11-04 RX ADMIN — HEPARIN SODIUM 7500 UNITS: 5000 INJECTION INTRAVENOUS; SUBCUTANEOUS at 14:12

## 2023-11-04 ASSESSMENT — PULMONARY FUNCTION TESTS
PIF_VALUE: 30
PIF_VALUE: 30
PIF_VALUE: 26
PIF_VALUE: 31
PIF_VALUE: 25
PIF_VALUE: 30
PIF_VALUE: 27
PIF_VALUE: 36
PIF_VALUE: 31

## 2023-11-05 ENCOUNTER — APPOINTMENT (OUTPATIENT)
Dept: GENERAL RADIOLOGY | Age: 58
DRG: 004 | End: 2023-11-05
Payer: MEDICARE

## 2023-11-05 LAB
ANION GAP SERPL CALCULATED.3IONS-SCNC: 8 MMOL/L (ref 9–17)
ANION GAP SERPL CALCULATED.3IONS-SCNC: 9 MMOL/L (ref 9–17)
BASOPHILS # BLD: 0 K/UL (ref 0–0.2)
BASOPHILS NFR BLD: 0 % (ref 0–2)
BUN SERPL-MCNC: 17 MG/DL (ref 6–20)
BUN SERPL-MCNC: 18 MG/DL (ref 6–20)
CALCIUM SERPL-MCNC: 8.2 MG/DL (ref 8.6–10.4)
CALCIUM SERPL-MCNC: 8.5 MG/DL (ref 8.6–10.4)
CHLORIDE SERPL-SCNC: 108 MMOL/L (ref 98–107)
CHLORIDE SERPL-SCNC: 109 MMOL/L (ref 98–107)
CO2 SERPL-SCNC: 32 MMOL/L (ref 20–31)
CO2 SERPL-SCNC: 32 MMOL/L (ref 20–31)
CREAT SERPL-MCNC: 1.7 MG/DL (ref 0.7–1.2)
CREAT SERPL-MCNC: 1.7 MG/DL (ref 0.7–1.2)
EOSINOPHIL # BLD: 0.83 K/UL (ref 0–0.44)
EOSINOPHILS RELATIVE PERCENT: 7 % (ref 1–4)
ERYTHROCYTE [DISTWIDTH] IN BLOOD BY AUTOMATED COUNT: 19.3 % (ref 11.8–14.4)
FIO2: 30
GFR SERPL CREATININE-BSD FRML MDRD: 46 ML/MIN/1.73M2
GFR SERPL CREATININE-BSD FRML MDRD: 46 ML/MIN/1.73M2
GLUCOSE BLD-MCNC: 103 MG/DL (ref 75–110)
GLUCOSE BLD-MCNC: 105 MG/DL (ref 75–110)
GLUCOSE BLD-MCNC: 109 MG/DL (ref 74–100)
GLUCOSE BLD-MCNC: 113 MG/DL (ref 75–110)
GLUCOSE BLD-MCNC: 88 MG/DL (ref 75–110)
GLUCOSE BLD-MCNC: 94 MG/DL (ref 75–110)
GLUCOSE SERPL-MCNC: 110 MG/DL (ref 70–99)
GLUCOSE SERPL-MCNC: 110 MG/DL (ref 70–99)
HCO3, MIXED: 35.6 MMOL/L (ref 23–29)
HCT VFR BLD AUTO: 35.3 % (ref 40.7–50.3)
HGB BLD-MCNC: 9.8 G/DL (ref 13–17)
IMM GRANULOCYTES # BLD AUTO: 0.24 K/UL (ref 0–0.3)
IMM GRANULOCYTES NFR BLD: 2 %
LYMPHOCYTES NFR BLD: 1.79 K/UL (ref 1.1–3.7)
LYMPHOCYTES RELATIVE PERCENT: 15 % (ref 24–43)
MAGNESIUM SERPL-MCNC: 1.8 MG/DL (ref 1.6–2.6)
MCH RBC QN AUTO: 25.9 PG (ref 25.2–33.5)
MCHC RBC AUTO-ENTMCNC: 27.8 G/DL (ref 28.4–34.8)
MCV RBC AUTO: 93.4 FL (ref 82.6–102.9)
MONOCYTES NFR BLD: 0.95 K/UL (ref 0.1–1.2)
MONOCYTES NFR BLD: 8 % (ref 3–12)
MORPHOLOGY: ABNORMAL
NEUTROPHILS NFR BLD: 68 % (ref 36–65)
NEUTS SEG NFR BLD: 8.09 K/UL (ref 1.5–8.1)
NRBC BLD-RTO: 0.2 PER 100 WBC
O2 SAT, MIXED: 78.7 % (ref 60–80)
PCO2 MIXED: 70.1 MM HG (ref 42–52)
PH, MIXED: 7.31 (ref 7.31–7.41)
PHOSPHATE SERPL-MCNC: 4 MG/DL (ref 2.5–4.5)
PLATELET # BLD AUTO: 157 K/UL (ref 138–453)
PMV BLD AUTO: 12.1 FL (ref 8.1–13.5)
PO2 MIXED: 49 MM HG (ref 35–45)
POSITIVE BASE EXCESS, MIXED: 7.4 MMOL/L (ref 0–3)
POTASSIUM SERPL-SCNC: 4.1 MMOL/L (ref 3.7–5.3)
POTASSIUM SERPL-SCNC: 4.4 MMOL/L (ref 3.7–5.3)
RBC # BLD AUTO: 3.78 M/UL (ref 4.21–5.77)
SODIUM SERPL-SCNC: 149 MMOL/L (ref 135–144)
SODIUM SERPL-SCNC: 149 MMOL/L (ref 135–144)
WBC OTHER # BLD: 11.9 K/UL (ref 3.5–11.3)

## 2023-11-05 PROCEDURE — 6360000002 HC RX W HCPCS

## 2023-11-05 PROCEDURE — 6360000002 HC RX W HCPCS: Performed by: STUDENT IN AN ORGANIZED HEALTH CARE EDUCATION/TRAINING PROGRAM

## 2023-11-05 PROCEDURE — 94003 VENT MGMT INPAT SUBQ DAY: CPT

## 2023-11-05 PROCEDURE — 82947 ASSAY GLUCOSE BLOOD QUANT: CPT

## 2023-11-05 PROCEDURE — 71045 X-RAY EXAM CHEST 1 VIEW: CPT

## 2023-11-05 PROCEDURE — 6370000000 HC RX 637 (ALT 250 FOR IP)

## 2023-11-05 PROCEDURE — 2580000003 HC RX 258: Performed by: STUDENT IN AN ORGANIZED HEALTH CARE EDUCATION/TRAINING PROGRAM

## 2023-11-05 PROCEDURE — 6370000000 HC RX 637 (ALT 250 FOR IP): Performed by: STUDENT IN AN ORGANIZED HEALTH CARE EDUCATION/TRAINING PROGRAM

## 2023-11-05 PROCEDURE — 84100 ASSAY OF PHOSPHORUS: CPT

## 2023-11-05 PROCEDURE — 2500000003 HC RX 250 WO HCPCS

## 2023-11-05 PROCEDURE — 2500000003 HC RX 250 WO HCPCS: Performed by: STUDENT IN AN ORGANIZED HEALTH CARE EDUCATION/TRAINING PROGRAM

## 2023-11-05 PROCEDURE — 94640 AIRWAY INHALATION TREATMENT: CPT

## 2023-11-05 PROCEDURE — 94761 N-INVAS EAR/PLS OXIMETRY MLT: CPT

## 2023-11-05 PROCEDURE — 2000000000 HC ICU R&B

## 2023-11-05 PROCEDURE — 82803 BLOOD GASES ANY COMBINATION: CPT

## 2023-11-05 PROCEDURE — 83735 ASSAY OF MAGNESIUM: CPT

## 2023-11-05 PROCEDURE — 80048 BASIC METABOLIC PNL TOTAL CA: CPT

## 2023-11-05 PROCEDURE — 36415 COLL VENOUS BLD VENIPUNCTURE: CPT

## 2023-11-05 PROCEDURE — 2700000000 HC OXYGEN THERAPY PER DAY

## 2023-11-05 PROCEDURE — 85025 COMPLETE CBC W/AUTO DIFF WBC: CPT

## 2023-11-05 RX ORDER — ALBUTEROL SULFATE 2.5 MG/3ML
2.5 SOLUTION RESPIRATORY (INHALATION) 2 TIMES DAILY
Status: DISCONTINUED | OUTPATIENT
Start: 2023-11-06 | End: 2023-11-06

## 2023-11-05 RX ORDER — BUMETANIDE 0.25 MG/ML
0.5 INJECTION INTRAMUSCULAR; INTRAVENOUS ONCE
Status: DISCONTINUED | OUTPATIENT
Start: 2023-11-05 | End: 2023-11-05

## 2023-11-05 RX ORDER — BUMETANIDE 0.25 MG/ML
1 INJECTION INTRAMUSCULAR; INTRAVENOUS 2 TIMES DAILY
Status: DISCONTINUED | OUTPATIENT
Start: 2023-11-05 | End: 2023-11-07

## 2023-11-05 RX ORDER — IPRATROPIUM BROMIDE AND ALBUTEROL SULFATE 2.5; .5 MG/3ML; MG/3ML
1 SOLUTION RESPIRATORY (INHALATION)
Status: DISCONTINUED | OUTPATIENT
Start: 2023-11-05 | End: 2023-11-09 | Stop reason: HOSPADM

## 2023-11-05 RX ORDER — OXYCODONE HYDROCHLORIDE AND ACETAMINOPHEN 5; 325 MG/1; MG/1
1 TABLET ORAL EVERY 4 HOURS
Status: DISCONTINUED | OUTPATIENT
Start: 2023-11-05 | End: 2023-11-09 | Stop reason: HOSPADM

## 2023-11-05 RX ADMIN — IPRATROPIUM BROMIDE AND ALBUTEROL SULFATE 1 DOSE: 2.5; .5 SOLUTION RESPIRATORY (INHALATION) at 19:45

## 2023-11-05 RX ADMIN — OXYCODONE AND ACETAMINOPHEN 1 TABLET: 5; 325 TABLET ORAL at 18:51

## 2023-11-05 RX ADMIN — HEPARIN SODIUM 7500 UNITS: 5000 INJECTION INTRAVENOUS; SUBCUTANEOUS at 04:51

## 2023-11-05 RX ADMIN — MIDODRINE HYDROCHLORIDE 15 MG: 5 TABLET ORAL at 05:47

## 2023-11-05 RX ADMIN — MIDODRINE HYDROCHLORIDE 15 MG: 5 TABLET ORAL at 13:39

## 2023-11-05 RX ADMIN — BUMETANIDE 1 MG: 0.25 INJECTION INTRAMUSCULAR; INTRAVENOUS at 10:01

## 2023-11-05 RX ADMIN — HEPARIN SODIUM 7500 UNITS: 5000 INJECTION INTRAVENOUS; SUBCUTANEOUS at 14:29

## 2023-11-05 RX ADMIN — Medication 125 MCG/HR: at 13:39

## 2023-11-05 RX ADMIN — POTASSIUM BICARBONATE 40 MEQ: 782 TABLET, EFFERVESCENT ORAL at 03:14

## 2023-11-05 RX ADMIN — PROPOFOL 15 MCG/KG/MIN: 10 INJECTION, EMULSION INTRAVENOUS at 04:50

## 2023-11-05 RX ADMIN — MIDODRINE HYDROCHLORIDE 15 MG: 5 TABLET ORAL at 20:44

## 2023-11-05 RX ADMIN — DOCUSATE SODIUM LIQUID 100 MG: 100 LIQUID ORAL at 10:04

## 2023-11-05 RX ADMIN — AMIODARONE HYDROCHLORIDE 200 MG: 200 TABLET ORAL at 09:45

## 2023-11-05 RX ADMIN — BUMETANIDE 1 MG: 0.25 INJECTION INTRAMUSCULAR; INTRAVENOUS at 20:43

## 2023-11-05 RX ADMIN — ALBUTEROL SULFATE 2.5 MG: 2.5 SOLUTION RESPIRATORY (INHALATION) at 23:32

## 2023-11-05 RX ADMIN — SODIUM CHLORIDE, PRESERVATIVE FREE 10 ML: 5 INJECTION INTRAVENOUS at 09:59

## 2023-11-05 RX ADMIN — DOCUSATE SODIUM LIQUID 100 MG: 100 LIQUID ORAL at 20:43

## 2023-11-05 RX ADMIN — LEVOTHYROXINE SODIUM 200 MCG: 100 TABLET ORAL at 09:45

## 2023-11-05 RX ADMIN — TRAZODONE HYDROCHLORIDE 75 MG: 50 TABLET ORAL at 20:44

## 2023-11-05 RX ADMIN — OXYCODONE AND ACETAMINOPHEN 1 TABLET: 5; 325 TABLET ORAL at 12:11

## 2023-11-05 RX ADMIN — PROPOFOL 15 MCG/KG/MIN: 10 INJECTION, EMULSION INTRAVENOUS at 11:28

## 2023-11-05 RX ADMIN — FENTANYL CITRATE 100 MCG: 50 INJECTION, SOLUTION INTRAMUSCULAR; INTRAVENOUS at 02:10

## 2023-11-05 RX ADMIN — OXYCODONE AND ACETAMINOPHEN 1 TABLET: 5; 325 TABLET ORAL at 20:44

## 2023-11-05 RX ADMIN — AMIODARONE HYDROCHLORIDE 200 MG: 200 TABLET ORAL at 20:43

## 2023-11-05 RX ADMIN — PROPOFOL 10 MCG/KG/MIN: 10 INJECTION, EMULSION INTRAVENOUS at 16:42

## 2023-11-05 RX ADMIN — IPRATROPIUM BROMIDE AND ALBUTEROL SULFATE 1 DOSE: 2.5; .5 SOLUTION RESPIRATORY (INHALATION) at 07:42

## 2023-11-05 RX ADMIN — CITALOPRAM HYDROBROMIDE 30 MG: 10 TABLET ORAL at 09:45

## 2023-11-05 RX ADMIN — IPRATROPIUM BROMIDE AND ALBUTEROL SULFATE 1 DOSE: 2.5; .5 SOLUTION RESPIRATORY (INHALATION) at 02:05

## 2023-11-05 RX ADMIN — FAMOTIDINE 20 MG: 20 TABLET, FILM COATED ORAL at 09:45

## 2023-11-05 RX ADMIN — ASPIRIN 81 MG: 81 TABLET, CHEWABLE ORAL at 09:45

## 2023-11-05 RX ADMIN — CLONAZEPAM 1 MG: 1 TABLET ORAL at 12:11

## 2023-11-05 RX ADMIN — HEPARIN SODIUM 7500 UNITS: 5000 INJECTION INTRAVENOUS; SUBCUTANEOUS at 20:44

## 2023-11-05 RX ADMIN — PROPOFOL 15 MCG/KG/MIN: 10 INJECTION, EMULSION INTRAVENOUS at 00:42

## 2023-11-05 RX ADMIN — OXYCODONE AND ACETAMINOPHEN 1 TABLET: 5; 325 TABLET ORAL at 14:29

## 2023-11-05 RX ADMIN — SODIUM CHLORIDE, PRESERVATIVE FREE 10 ML: 5 INJECTION INTRAVENOUS at 20:44

## 2023-11-05 RX ADMIN — FAMOTIDINE 20 MG: 20 TABLET, FILM COATED ORAL at 20:43

## 2023-11-05 RX ADMIN — IPRATROPIUM BROMIDE AND ALBUTEROL SULFATE 1 DOSE: 2.5; .5 SOLUTION RESPIRATORY (INHALATION) at 14:33

## 2023-11-05 ASSESSMENT — PULMONARY FUNCTION TESTS
PIF_VALUE: 28
PIF_VALUE: 30
PIF_VALUE: 28
PIF_VALUE: 34

## 2023-11-06 LAB
ALLEN TEST: POSITIVE
ANION GAP SERPL CALCULATED.3IONS-SCNC: 10 MMOL/L (ref 9–17)
BASOPHILS # BLD: 0.12 K/UL (ref 0–0.2)
BASOPHILS NFR BLD: 1 % (ref 0–2)
BUN SERPL-MCNC: 16 MG/DL (ref 6–20)
CALCIUM SERPL-MCNC: 8.3 MG/DL (ref 8.6–10.4)
CHLORIDE SERPL-SCNC: 109 MMOL/L (ref 98–107)
CO2 SERPL-SCNC: 32 MMOL/L (ref 20–31)
CREAT SERPL-MCNC: 1.8 MG/DL (ref 0.7–1.2)
EKG ATRIAL RATE: 125 BPM
EKG Q-T INTERVAL: 486 MS
EKG QRS DURATION: 158 MS
EKG QTC CALCULATION (BAZETT): 524 MS
EKG R AXIS: -43 DEGREES
EKG T AXIS: 119 DEGREES
EKG VENTRICULAR RATE: 70 BPM
EOSINOPHIL # BLD: 0.73 K/UL (ref 0–0.44)
EOSINOPHILS RELATIVE PERCENT: 6 % (ref 1–4)
ERYTHROCYTE [DISTWIDTH] IN BLOOD BY AUTOMATED COUNT: 19.2 % (ref 11.8–14.4)
FIO2: 40
FIO2: 40
GFR SERPL CREATININE-BSD FRML MDRD: 43 ML/MIN/1.73M2
GLUCOSE BLD-MCNC: 115 MG/DL (ref 75–110)
GLUCOSE BLD-MCNC: 119 MG/DL (ref 75–110)
GLUCOSE BLD-MCNC: 125 MG/DL (ref 75–110)
GLUCOSE BLD-MCNC: 132 MG/DL (ref 74–100)
GLUCOSE SERPL-MCNC: 126 MG/DL (ref 70–99)
HCT VFR BLD AUTO: 33.7 % (ref 40.7–50.3)
HGB BLD-MCNC: 8.8 G/DL (ref 13–17)
IMM GRANULOCYTES # BLD AUTO: 0.24 K/UL (ref 0–0.3)
IMM GRANULOCYTES NFR BLD: 2 %
LYMPHOCYTES NFR BLD: 1.46 K/UL (ref 1.1–3.7)
LYMPHOCYTES RELATIVE PERCENT: 12 % (ref 24–43)
MAGNESIUM SERPL-MCNC: 1.9 MG/DL (ref 1.6–2.6)
MCH RBC QN AUTO: 25.5 PG (ref 25.2–33.5)
MCHC RBC AUTO-ENTMCNC: 26.1 G/DL (ref 28.4–34.8)
MCV RBC AUTO: 97.7 FL (ref 82.6–102.9)
MONOCYTES NFR BLD: 1.1 K/UL (ref 0.1–1.2)
MONOCYTES NFR BLD: 9 % (ref 3–12)
MORPHOLOGY: ABNORMAL
MORPHOLOGY: ABNORMAL
NEUTROPHILS NFR BLD: 70 % (ref 36–65)
NEUTS SEG NFR BLD: 8.55 K/UL (ref 1.5–8.1)
NRBC BLD-RTO: 0 PER 100 WBC
O2 DELIVERY DEVICE: ABNORMAL
PHOSPHATE SERPL-MCNC: 4.5 MG/DL (ref 2.5–4.5)
PLATELET # BLD AUTO: 129 K/UL (ref 138–453)
PMV BLD AUTO: 12.2 FL (ref 8.1–13.5)
POC HCO3: 36.3 MMOL/L (ref 21–28)
POC HCO3: 37.2 MMOL/L (ref 21–28)
POC O2 SATURATION: 92.4 % (ref 94–98)
POC O2 SATURATION: 94.2 % (ref 94–98)
POC PCO2: 71.3 MM HG (ref 35–48)
POC PCO2: 81.2 MM HG (ref 35–48)
POC PH: 7.27 (ref 7.35–7.45)
POC PH: 7.32 (ref 7.35–7.45)
POC PO2: 78.1 MM HG (ref 83–108)
POC PO2: 81.3 MM HG (ref 83–108)
POSITIVE BASE EXCESS, ART: 8.1 MMOL/L (ref 0–3)
POSITIVE BASE EXCESS, ART: 8.2 MMOL/L (ref 0–3)
POTASSIUM SERPL-SCNC: 3.6 MMOL/L (ref 3.7–5.3)
RBC # BLD AUTO: 3.45 M/UL (ref 4.21–5.77)
SAMPLE SITE: ABNORMAL
SODIUM SERPL-SCNC: 151 MMOL/L (ref 135–144)
WBC OTHER # BLD: 12.2 K/UL (ref 3.5–11.3)

## 2023-11-06 PROCEDURE — 93005 ELECTROCARDIOGRAM TRACING: CPT | Performed by: INTERNAL MEDICINE

## 2023-11-06 PROCEDURE — 99291 CRITICAL CARE FIRST HOUR: CPT | Performed by: INTERNAL MEDICINE

## 2023-11-06 PROCEDURE — 6370000000 HC RX 637 (ALT 250 FOR IP)

## 2023-11-06 PROCEDURE — 94640 AIRWAY INHALATION TREATMENT: CPT

## 2023-11-06 PROCEDURE — 36415 COLL VENOUS BLD VENIPUNCTURE: CPT

## 2023-11-06 PROCEDURE — 93010 ELECTROCARDIOGRAM REPORT: CPT | Performed by: INTERNAL MEDICINE

## 2023-11-06 PROCEDURE — 80048 BASIC METABOLIC PNL TOTAL CA: CPT

## 2023-11-06 PROCEDURE — 82803 BLOOD GASES ANY COMBINATION: CPT

## 2023-11-06 PROCEDURE — 94003 VENT MGMT INPAT SUBQ DAY: CPT

## 2023-11-06 PROCEDURE — 6360000002 HC RX W HCPCS

## 2023-11-06 PROCEDURE — 2580000003 HC RX 258: Performed by: PEDIATRICS

## 2023-11-06 PROCEDURE — 84100 ASSAY OF PHOSPHORUS: CPT

## 2023-11-06 PROCEDURE — 6370000000 HC RX 637 (ALT 250 FOR IP): Performed by: STUDENT IN AN ORGANIZED HEALTH CARE EDUCATION/TRAINING PROGRAM

## 2023-11-06 PROCEDURE — 6360000002 HC RX W HCPCS: Performed by: STUDENT IN AN ORGANIZED HEALTH CARE EDUCATION/TRAINING PROGRAM

## 2023-11-06 PROCEDURE — 2580000003 HC RX 258: Performed by: STUDENT IN AN ORGANIZED HEALTH CARE EDUCATION/TRAINING PROGRAM

## 2023-11-06 PROCEDURE — 82947 ASSAY GLUCOSE BLOOD QUANT: CPT

## 2023-11-06 PROCEDURE — 2700000000 HC OXYGEN THERAPY PER DAY

## 2023-11-06 PROCEDURE — 2500000003 HC RX 250 WO HCPCS

## 2023-11-06 PROCEDURE — 83735 ASSAY OF MAGNESIUM: CPT

## 2023-11-06 PROCEDURE — 85025 COMPLETE CBC W/AUTO DIFF WBC: CPT

## 2023-11-06 PROCEDURE — 36600 WITHDRAWAL OF ARTERIAL BLOOD: CPT

## 2023-11-06 PROCEDURE — 94761 N-INVAS EAR/PLS OXIMETRY MLT: CPT

## 2023-11-06 PROCEDURE — 2000000000 HC ICU R&B

## 2023-11-06 PROCEDURE — 2500000003 HC RX 250 WO HCPCS: Performed by: STUDENT IN AN ORGANIZED HEALTH CARE EDUCATION/TRAINING PROGRAM

## 2023-11-06 PROCEDURE — 99231 SBSQ HOSP IP/OBS SF/LOW 25: CPT | Performed by: INTERNAL MEDICINE

## 2023-11-06 RX ORDER — ALBUTEROL SULFATE 2.5 MG/3ML
2.5 SOLUTION RESPIRATORY (INHALATION) 2 TIMES DAILY
Status: DISCONTINUED | OUTPATIENT
Start: 2023-11-06 | End: 2023-11-09 | Stop reason: HOSPADM

## 2023-11-06 RX ADMIN — BUMETANIDE 1 MG: 0.25 INJECTION INTRAMUSCULAR; INTRAVENOUS at 08:13

## 2023-11-06 RX ADMIN — ALBUTEROL SULFATE 2.5 MG: 2.5 SOLUTION RESPIRATORY (INHALATION) at 03:31

## 2023-11-06 RX ADMIN — ASPIRIN 81 MG: 81 TABLET, CHEWABLE ORAL at 08:19

## 2023-11-06 RX ADMIN — PROPOFOL 25 MCG/KG/MIN: 10 INJECTION, EMULSION INTRAVENOUS at 17:48

## 2023-11-06 RX ADMIN — CLONAZEPAM 1 MG: 1 TABLET ORAL at 01:22

## 2023-11-06 RX ADMIN — SODIUM CHLORIDE, PRESERVATIVE FREE 10 ML: 5 INJECTION INTRAVENOUS at 08:14

## 2023-11-06 RX ADMIN — AMIODARONE HYDROCHLORIDE 200 MG: 200 TABLET ORAL at 20:44

## 2023-11-06 RX ADMIN — OXYCODONE AND ACETAMINOPHEN 1 TABLET: 5; 325 TABLET ORAL at 09:59

## 2023-11-06 RX ADMIN — ALBUTEROL SULFATE 2.5 MG: 2.5 SOLUTION RESPIRATORY (INHALATION) at 23:35

## 2023-11-06 RX ADMIN — HEPARIN SODIUM 7500 UNITS: 5000 INJECTION INTRAVENOUS; SUBCUTANEOUS at 14:15

## 2023-11-06 RX ADMIN — LEVOTHYROXINE SODIUM 200 MCG: 100 TABLET ORAL at 08:19

## 2023-11-06 RX ADMIN — PROPOFOL 20 MCG/KG/MIN: 10 INJECTION, EMULSION INTRAVENOUS at 14:45

## 2023-11-06 RX ADMIN — MIDODRINE HYDROCHLORIDE 15 MG: 5 TABLET ORAL at 01:22

## 2023-11-06 RX ADMIN — DOCUSATE SODIUM LIQUID 100 MG: 100 LIQUID ORAL at 08:19

## 2023-11-06 RX ADMIN — OXYCODONE AND ACETAMINOPHEN 1 TABLET: 5; 325 TABLET ORAL at 20:44

## 2023-11-06 RX ADMIN — CLONAZEPAM 1 MG: 1 TABLET ORAL at 11:51

## 2023-11-06 RX ADMIN — OXYCODONE AND ACETAMINOPHEN 1 TABLET: 5; 325 TABLET ORAL at 01:22

## 2023-11-06 RX ADMIN — DOCUSATE SODIUM LIQUID 100 MG: 100 LIQUID ORAL at 20:44

## 2023-11-06 RX ADMIN — CITALOPRAM HYDROBROMIDE 30 MG: 10 TABLET ORAL at 08:29

## 2023-11-06 RX ADMIN — CLONAZEPAM 1 MG: 1 TABLET ORAL at 23:35

## 2023-11-06 RX ADMIN — Medication 125 MCG/HR: at 05:07

## 2023-11-06 RX ADMIN — PROPOFOL 10 MCG/KG/MIN: 10 INJECTION, EMULSION INTRAVENOUS at 08:43

## 2023-11-06 RX ADMIN — FAMOTIDINE 20 MG: 20 TABLET, FILM COATED ORAL at 08:19

## 2023-11-06 RX ADMIN — OXYCODONE AND ACETAMINOPHEN 1 TABLET: 5; 325 TABLET ORAL at 18:32

## 2023-11-06 RX ADMIN — MIDODRINE HYDROCHLORIDE 15 MG: 5 TABLET ORAL at 20:44

## 2023-11-06 RX ADMIN — IPRATROPIUM BROMIDE AND ALBUTEROL SULFATE 1 DOSE: 2.5; .5 SOLUTION RESPIRATORY (INHALATION) at 15:08

## 2023-11-06 RX ADMIN — FAMOTIDINE 20 MG: 20 TABLET, FILM COATED ORAL at 20:44

## 2023-11-06 RX ADMIN — IPRATROPIUM BROMIDE AND ALBUTEROL SULFATE 1 DOSE: 2.5; .5 SOLUTION RESPIRATORY (INHALATION) at 11:32

## 2023-11-06 RX ADMIN — IPRATROPIUM BROMIDE AND ALBUTEROL SULFATE 1 DOSE: 2.5; .5 SOLUTION RESPIRATORY (INHALATION) at 20:32

## 2023-11-06 RX ADMIN — OXYCODONE AND ACETAMINOPHEN 1 TABLET: 5; 325 TABLET ORAL at 14:10

## 2023-11-06 RX ADMIN — IPRATROPIUM BROMIDE AND ALBUTEROL SULFATE 1 DOSE: 2.5; .5 SOLUTION RESPIRATORY (INHALATION) at 07:33

## 2023-11-06 RX ADMIN — PROPOFOL 25 MCG/KG/MIN: 10 INJECTION, EMULSION INTRAVENOUS at 21:49

## 2023-11-06 RX ADMIN — SODIUM CHLORIDE: 9 INJECTION, SOLUTION INTRAVENOUS at 01:36

## 2023-11-06 RX ADMIN — MIDODRINE HYDROCHLORIDE 15 MG: 5 TABLET ORAL at 05:33

## 2023-11-06 RX ADMIN — TRAZODONE HYDROCHLORIDE 75 MG: 50 TABLET ORAL at 20:44

## 2023-11-06 RX ADMIN — HEPARIN SODIUM 7500 UNITS: 5000 INJECTION INTRAVENOUS; SUBCUTANEOUS at 05:33

## 2023-11-06 RX ADMIN — Medication 150 MCG/HR: at 14:48

## 2023-11-06 RX ADMIN — HEPARIN SODIUM 7500 UNITS: 5000 INJECTION INTRAVENOUS; SUBCUTANEOUS at 20:44

## 2023-11-06 RX ADMIN — AMIODARONE HYDROCHLORIDE 200 MG: 200 TABLET ORAL at 08:19

## 2023-11-06 RX ADMIN — BUMETANIDE 1 MG: 0.25 INJECTION INTRAMUSCULAR; INTRAVENOUS at 20:44

## 2023-11-06 RX ADMIN — OXYCODONE AND ACETAMINOPHEN 1 TABLET: 5; 325 TABLET ORAL at 05:33

## 2023-11-06 RX ADMIN — SODIUM CHLORIDE, PRESERVATIVE FREE 10 ML: 5 INJECTION INTRAVENOUS at 20:44

## 2023-11-06 RX ADMIN — PROPOFOL 10 MCG/KG/MIN: 10 INJECTION, EMULSION INTRAVENOUS at 01:36

## 2023-11-06 ASSESSMENT — PULMONARY FUNCTION TESTS
PIF_VALUE: 26
PIF_VALUE: 38
PIF_VALUE: 34
PIF_VALUE: 25
PIF_VALUE: 27

## 2023-11-07 LAB
ANION GAP SERPL CALCULATED.3IONS-SCNC: 11 MMOL/L (ref 9–17)
BASOPHILS # BLD: 0.05 K/UL (ref 0–0.2)
BASOPHILS NFR BLD: 1 % (ref 0–2)
BUN SERPL-MCNC: 16 MG/DL (ref 6–20)
CALCIUM SERPL-MCNC: 8.1 MG/DL (ref 8.6–10.4)
CHLORIDE SERPL-SCNC: 107 MMOL/L (ref 98–107)
CO2 SERPL-SCNC: 31 MMOL/L (ref 20–31)
CREAT SERPL-MCNC: 2 MG/DL (ref 0.7–1.2)
EKG ATRIAL RATE: 150 BPM
EKG Q-T INTERVAL: 414 MS
EKG QRS DURATION: 146 MS
EKG QTC CALCULATION (BAZETT): 632 MS
EKG R AXIS: -50 DEGREES
EKG T AXIS: 124 DEGREES
EKG VENTRICULAR RATE: 140 BPM
EOSINOPHIL # BLD: 0.05 K/UL (ref 0–0.4)
EOSINOPHILS RELATIVE PERCENT: 1 % (ref 1–4)
ERYTHROCYTE [DISTWIDTH] IN BLOOD BY AUTOMATED COUNT: 20.4 % (ref 11.8–14.4)
FIO2: 40
GFR SERPL CREATININE-BSD FRML MDRD: 38 ML/MIN/1.73M2
GLUCOSE BLD-MCNC: 109 MG/DL (ref 75–110)
GLUCOSE BLD-MCNC: 120 MG/DL (ref 75–110)
GLUCOSE BLD-MCNC: 121 MG/DL (ref 75–110)
GLUCOSE BLD-MCNC: 123 MG/DL (ref 74–100)
GLUCOSE SERPL-MCNC: 118 MG/DL (ref 70–99)
HCT VFR BLD AUTO: 24.9 % (ref 40.7–50.3)
HGB BLD-MCNC: 7.4 G/DL (ref 13–17)
IMM GRANULOCYTES # BLD AUTO: 0 K/UL (ref 0–0.3)
IMM GRANULOCYTES NFR BLD: 0 %
LYMPHOCYTES NFR BLD: 0.88 K/UL (ref 1–4.8)
LYMPHOCYTES RELATIVE PERCENT: 17 % (ref 24–44)
MAGNESIUM SERPL-MCNC: 2.1 MG/DL (ref 1.6–2.6)
MCH RBC QN AUTO: 27.5 PG (ref 25.2–33.5)
MCHC RBC AUTO-ENTMCNC: 29.7 G/DL (ref 28.4–34.8)
MCV RBC AUTO: 92.6 FL (ref 82.6–102.9)
MONOCYTES NFR BLD: 0.31 K/UL (ref 0.1–0.8)
MONOCYTES NFR BLD: 6 % (ref 1–7)
MORPHOLOGY: ABNORMAL
MORPHOLOGY: ABNORMAL
NEUTROPHILS NFR BLD: 75 % (ref 36–66)
NEUTS SEG NFR BLD: 3.91 K/UL (ref 1.8–7.7)
NRBC BLD-RTO: 0.4 PER 100 WBC
PHOSPHATE SERPL-MCNC: 3 MG/DL (ref 2.5–4.5)
PLATELET # BLD AUTO: ABNORMAL K/UL (ref 138–453)
PLATELET, FLUORESCENCE: 109 K/UL (ref 138–453)
PLATELETS.RETICULATED NFR BLD AUTO: 4.1 % (ref 1.1–10.3)
POC HCO3: 35.1 MMOL/L (ref 21–28)
POC O2 SATURATION: 96.5 % (ref 94–98)
POC PCO2: 51.8 MM HG (ref 35–48)
POC PH: 7.44 (ref 7.35–7.45)
POC PO2: 85 MM HG (ref 83–108)
POSITIVE BASE EXCESS, ART: 9.8 MMOL/L (ref 0–3)
POTASSIUM SERPL-SCNC: 3.5 MMOL/L (ref 3.7–5.3)
RBC # BLD AUTO: 2.69 M/UL (ref 4.21–5.77)
SODIUM SERPL-SCNC: 149 MMOL/L (ref 135–144)
WBC OTHER # BLD: 5.2 K/UL (ref 3.5–11.3)

## 2023-11-07 PROCEDURE — 2700000000 HC OXYGEN THERAPY PER DAY

## 2023-11-07 PROCEDURE — 80048 BASIC METABOLIC PNL TOTAL CA: CPT

## 2023-11-07 PROCEDURE — 83735 ASSAY OF MAGNESIUM: CPT

## 2023-11-07 PROCEDURE — 6370000000 HC RX 637 (ALT 250 FOR IP)

## 2023-11-07 PROCEDURE — 94640 AIRWAY INHALATION TREATMENT: CPT

## 2023-11-07 PROCEDURE — 2500000003 HC RX 250 WO HCPCS

## 2023-11-07 PROCEDURE — 82947 ASSAY GLUCOSE BLOOD QUANT: CPT

## 2023-11-07 PROCEDURE — 6370000000 HC RX 637 (ALT 250 FOR IP): Performed by: STUDENT IN AN ORGANIZED HEALTH CARE EDUCATION/TRAINING PROGRAM

## 2023-11-07 PROCEDURE — 6360000002 HC RX W HCPCS

## 2023-11-07 PROCEDURE — 2580000003 HC RX 258

## 2023-11-07 PROCEDURE — 6360000002 HC RX W HCPCS: Performed by: STUDENT IN AN ORGANIZED HEALTH CARE EDUCATION/TRAINING PROGRAM

## 2023-11-07 PROCEDURE — 82803 BLOOD GASES ANY COMBINATION: CPT

## 2023-11-07 PROCEDURE — 85025 COMPLETE CBC W/AUTO DIFF WBC: CPT

## 2023-11-07 PROCEDURE — 94761 N-INVAS EAR/PLS OXIMETRY MLT: CPT

## 2023-11-07 PROCEDURE — 99291 CRITICAL CARE FIRST HOUR: CPT | Performed by: INTERNAL MEDICINE

## 2023-11-07 PROCEDURE — 36600 WITHDRAWAL OF ARTERIAL BLOOD: CPT

## 2023-11-07 PROCEDURE — 94003 VENT MGMT INPAT SUBQ DAY: CPT

## 2023-11-07 PROCEDURE — 85055 RETICULATED PLATELET ASSAY: CPT

## 2023-11-07 PROCEDURE — 99212 OFFICE O/P EST SF 10 MIN: CPT

## 2023-11-07 PROCEDURE — 2000000000 HC ICU R&B

## 2023-11-07 PROCEDURE — 2500000003 HC RX 250 WO HCPCS: Performed by: STUDENT IN AN ORGANIZED HEALTH CARE EDUCATION/TRAINING PROGRAM

## 2023-11-07 PROCEDURE — 84100 ASSAY OF PHOSPHORUS: CPT

## 2023-11-07 PROCEDURE — 2580000003 HC RX 258: Performed by: STUDENT IN AN ORGANIZED HEALTH CARE EDUCATION/TRAINING PROGRAM

## 2023-11-07 PROCEDURE — 36415 COLL VENOUS BLD VENIPUNCTURE: CPT

## 2023-11-07 PROCEDURE — 93010 ELECTROCARDIOGRAM REPORT: CPT | Performed by: INTERNAL MEDICINE

## 2023-11-07 PROCEDURE — 99232 SBSQ HOSP IP/OBS MODERATE 35: CPT | Performed by: SURGERY

## 2023-11-07 RX ORDER — BUMETANIDE 1 MG/1
1 TABLET ORAL DAILY
Status: DISCONTINUED | OUTPATIENT
Start: 2023-11-07 | End: 2023-11-09 | Stop reason: HOSPADM

## 2023-11-07 RX ORDER — SODIUM CHLORIDE, SODIUM LACTATE, POTASSIUM CHLORIDE, CALCIUM CHLORIDE 600; 310; 30; 20 MG/100ML; MG/100ML; MG/100ML; MG/100ML
INJECTION, SOLUTION INTRAVENOUS CONTINUOUS
Status: ACTIVE | OUTPATIENT
Start: 2023-11-08 | End: 2023-11-08

## 2023-11-07 RX ADMIN — DOCUSATE SODIUM LIQUID 100 MG: 100 LIQUID ORAL at 19:08

## 2023-11-07 RX ADMIN — OXYCODONE AND ACETAMINOPHEN 1 TABLET: 5; 325 TABLET ORAL at 22:19

## 2023-11-07 RX ADMIN — PROPOFOL 20 MCG/KG/MIN: 10 INJECTION, EMULSION INTRAVENOUS at 05:34

## 2023-11-07 RX ADMIN — OXYCODONE AND ACETAMINOPHEN 1 TABLET: 5; 325 TABLET ORAL at 01:29

## 2023-11-07 RX ADMIN — CLONAZEPAM 1 MG: 1 TABLET ORAL at 23:45

## 2023-11-07 RX ADMIN — PROPOFOL 20 MCG/KG/MIN: 10 INJECTION, EMULSION INTRAVENOUS at 18:48

## 2023-11-07 RX ADMIN — FAMOTIDINE 20 MG: 20 TABLET, FILM COATED ORAL at 08:13

## 2023-11-07 RX ADMIN — MIDODRINE HYDROCHLORIDE 15 MG: 5 TABLET ORAL at 18:23

## 2023-11-07 RX ADMIN — OXYCODONE AND ACETAMINOPHEN 1 TABLET: 5; 325 TABLET ORAL at 05:32

## 2023-11-07 RX ADMIN — SODIUM CHLORIDE, PRESERVATIVE FREE 10 ML: 5 INJECTION INTRAVENOUS at 08:17

## 2023-11-07 RX ADMIN — MIDODRINE HYDROCHLORIDE 15 MG: 5 TABLET ORAL at 05:32

## 2023-11-07 RX ADMIN — MIDODRINE HYDROCHLORIDE 15 MG: 5 TABLET ORAL at 12:43

## 2023-11-07 RX ADMIN — Medication 150 MCG/HR: at 05:41

## 2023-11-07 RX ADMIN — FAMOTIDINE 20 MG: 20 TABLET, FILM COATED ORAL at 19:08

## 2023-11-07 RX ADMIN — DOCUSATE SODIUM LIQUID 100 MG: 100 LIQUID ORAL at 08:13

## 2023-11-07 RX ADMIN — PROPOFOL 20 MCG/KG/MIN: 10 INJECTION, EMULSION INTRAVENOUS at 02:58

## 2023-11-07 RX ADMIN — HEPARIN SODIUM 7500 UNITS: 5000 INJECTION INTRAVENOUS; SUBCUTANEOUS at 22:22

## 2023-11-07 RX ADMIN — IPRATROPIUM BROMIDE AND ALBUTEROL SULFATE 1 DOSE: 2.5; .5 SOLUTION RESPIRATORY (INHALATION) at 07:58

## 2023-11-07 RX ADMIN — ALBUTEROL SULFATE 2.5 MG: 2.5 SOLUTION RESPIRATORY (INHALATION) at 03:07

## 2023-11-07 RX ADMIN — ASPIRIN 81 MG: 81 TABLET, CHEWABLE ORAL at 08:13

## 2023-11-07 RX ADMIN — ALBUTEROL SULFATE 2.5 MG: 2.5 SOLUTION RESPIRATORY (INHALATION) at 23:23

## 2023-11-07 RX ADMIN — HEPARIN SODIUM 7500 UNITS: 5000 INJECTION INTRAVENOUS; SUBCUTANEOUS at 12:44

## 2023-11-07 RX ADMIN — CITALOPRAM HYDROBROMIDE 30 MG: 10 TABLET ORAL at 08:12

## 2023-11-07 RX ADMIN — AMIODARONE HYDROCHLORIDE 200 MG: 200 TABLET ORAL at 08:13

## 2023-11-07 RX ADMIN — OXYCODONE AND ACETAMINOPHEN 1 TABLET: 5; 325 TABLET ORAL at 10:21

## 2023-11-07 RX ADMIN — PROPOFOL 20 MCG/KG/MIN: 10 INJECTION, EMULSION INTRAVENOUS at 10:21

## 2023-11-07 RX ADMIN — MIDODRINE HYDROCHLORIDE 15 MG: 5 TABLET ORAL at 01:29

## 2023-11-07 RX ADMIN — IPRATROPIUM BROMIDE AND ALBUTEROL SULFATE 1 DOSE: 2.5; .5 SOLUTION RESPIRATORY (INHALATION) at 12:26

## 2023-11-07 RX ADMIN — POTASSIUM BICARBONATE 40 MEQ: 782 TABLET, EFFERVESCENT ORAL at 06:51

## 2023-11-07 RX ADMIN — TRAZODONE HYDROCHLORIDE 75 MG: 50 TABLET ORAL at 19:09

## 2023-11-07 RX ADMIN — SODIUM CHLORIDE, POTASSIUM CHLORIDE, SODIUM LACTATE AND CALCIUM CHLORIDE: 600; 310; 30; 20 INJECTION, SOLUTION INTRAVENOUS at 23:49

## 2023-11-07 RX ADMIN — IPRATROPIUM BROMIDE AND ALBUTEROL SULFATE 1 DOSE: 2.5; .5 SOLUTION RESPIRATORY (INHALATION) at 15:57

## 2023-11-07 RX ADMIN — BUMETANIDE 1 MG: 0.25 INJECTION INTRAMUSCULAR; INTRAVENOUS at 08:13

## 2023-11-07 RX ADMIN — SODIUM CHLORIDE, PRESERVATIVE FREE 10 ML: 5 INJECTION INTRAVENOUS at 19:10

## 2023-11-07 RX ADMIN — LEVOTHYROXINE SODIUM 200 MCG: 100 TABLET ORAL at 08:13

## 2023-11-07 RX ADMIN — IPRATROPIUM BROMIDE AND ALBUTEROL SULFATE 1 DOSE: 2.5; .5 SOLUTION RESPIRATORY (INHALATION) at 19:56

## 2023-11-07 RX ADMIN — OXYCODONE AND ACETAMINOPHEN 1 TABLET: 5; 325 TABLET ORAL at 18:23

## 2023-11-07 RX ADMIN — PROPOFOL 20 MCG/KG/MIN: 10 INJECTION, EMULSION INTRAVENOUS at 23:25

## 2023-11-07 RX ADMIN — OXYCODONE AND ACETAMINOPHEN 1 TABLET: 5; 325 TABLET ORAL at 15:09

## 2023-11-07 RX ADMIN — HEPARIN SODIUM 7500 UNITS: 5000 INJECTION INTRAVENOUS; SUBCUTANEOUS at 05:32

## 2023-11-07 RX ADMIN — CLONAZEPAM 1 MG: 1 TABLET ORAL at 12:44

## 2023-11-07 RX ADMIN — Medication 150 MCG/HR: at 20:46

## 2023-11-07 RX ADMIN — PROPOFOL 20 MCG/KG/MIN: 10 INJECTION, EMULSION INTRAVENOUS at 15:11

## 2023-11-07 RX ADMIN — AMIODARONE HYDROCHLORIDE 200 MG: 200 TABLET ORAL at 19:08

## 2023-11-07 ASSESSMENT — PULMONARY FUNCTION TESTS
PIF_VALUE: 35
PIF_VALUE: 45
PIF_VALUE: 33
PIF_VALUE: 33
PIF_VALUE: 37
PIF_VALUE: 53
PIF_VALUE: 34
PIF_VALUE: 33
PIF_VALUE: 42

## 2023-11-07 NOTE — ACP (ADVANCE CARE PLANNING)
Advance Care Planning     Advance Care Planning (ACP) Physician/NP/PA (Provider) Conversation      Date of ACP Conversation: 11/6/23    Conversation Conducted with:   Patient with Decision Making Capacity   Healthcare Decision Maker: Next of Kin by law (only applies in absence of above) (name) patient's four children    Health Care Decision Maker:    Current Designated Health Care Decision Maker:    Primary Decision Maker: Beverley Gosselin - Child - [phone]    Primary Decision Maker: Jad Felix Child - [phone]      Care Preferences:    Hospitalization: \"If your health worsens and it becomes clear that your chance of recovery is unlikely, what would your preference be regarding hospitalization? \"  Currently hospitalized    Ventilation: \"If you were in your present state of health and suddenly became very ill and were unable to breathe on your own, what would your preference be about the use of a ventilator (breathing machine) if it was available to you? \"    Full code    \"If your health worsens and it becomes clear that your chance of recovery is unlikely, what would your preference be about the use of a ventilator (breathing machine) if it was available to you? \"   Full code    Resuscitation:  \"CPR works best to restart the heart when there is a sudden event, like a heart attack, in someone who is otherwise healthy. Unfortunately, CPR does not typically restart the heart for people who have serious health conditions or who are very sick. \"    \"In the event your heart stopped as a result of an underlying serious health condition, would you want attempts to be made to restart your heart (answer \"yes\" for attempt to resuscitate) or would you prefer a natural death (answer \"no\" for do not attempt to resuscitate)? \"   Full code    Conversation Outcomes / Follow-Up Plan:   Discussed with patient and family. All in agreement with trach/peg  4 children are decision makers.       Length of Voluntary ACP Conversation in

## 2023-11-08 ENCOUNTER — ANESTHESIA EVENT (OUTPATIENT)
Dept: OPERATING ROOM | Age: 58
DRG: 004 | End: 2023-11-08
Payer: MEDICARE

## 2023-11-08 ENCOUNTER — ANESTHESIA (OUTPATIENT)
Dept: OPERATING ROOM | Age: 58
DRG: 004 | End: 2023-11-08
Payer: MEDICARE

## 2023-11-08 ENCOUNTER — APPOINTMENT (OUTPATIENT)
Dept: GENERAL RADIOLOGY | Age: 58
DRG: 004 | End: 2023-11-08
Payer: MEDICARE

## 2023-11-08 LAB
ALLEN TEST: POSITIVE
ANION GAP SERPL CALCULATED.3IONS-SCNC: 9 MMOL/L (ref 9–17)
BASOPHILS # BLD: 0.06 K/UL (ref 0–0.2)
BASOPHILS NFR BLD: 1 % (ref 0–2)
BUN SERPL-MCNC: 18 MG/DL (ref 6–20)
CALCIUM SERPL-MCNC: 8.3 MG/DL (ref 8.6–10.4)
CHLORIDE SERPL-SCNC: 103 MMOL/L (ref 98–107)
CO2 SERPL-SCNC: 33 MMOL/L (ref 20–31)
CREAT SERPL-MCNC: 2.1 MG/DL (ref 0.7–1.2)
EOSINOPHIL # BLD: 0.24 K/UL (ref 0–0.44)
EOSINOPHILS RELATIVE PERCENT: 4 % (ref 1–4)
ERYTHROCYTE [DISTWIDTH] IN BLOOD BY AUTOMATED COUNT: 20.2 % (ref 11.8–14.4)
FIO2: 35
GFR SERPL CREATININE-BSD FRML MDRD: 36 ML/MIN/1.73M2
GLUCOSE BLD-MCNC: 112 MG/DL (ref 75–110)
GLUCOSE BLD-MCNC: 113 MG/DL (ref 74–100)
GLUCOSE BLD-MCNC: 119 MG/DL (ref 75–110)
GLUCOSE SERPL-MCNC: 119 MG/DL (ref 70–99)
HCT VFR BLD AUTO: 28.2 % (ref 40.7–50.3)
HGB BLD-MCNC: 8.4 G/DL (ref 13–17)
IMM GRANULOCYTES # BLD AUTO: 0.06 K/UL (ref 0–0.3)
IMM GRANULOCYTES NFR BLD: 1 %
LYMPHOCYTES NFR BLD: 0.79 K/UL (ref 1.1–3.7)
LYMPHOCYTES RELATIVE PERCENT: 13 % (ref 24–43)
MAGNESIUM SERPL-MCNC: 1.8 MG/DL (ref 1.6–2.6)
MCH RBC QN AUTO: 26.8 PG (ref 25.2–33.5)
MCHC RBC AUTO-ENTMCNC: 29.8 G/DL (ref 28.4–34.8)
MCV RBC AUTO: 89.8 FL (ref 82.6–102.9)
MONOCYTES NFR BLD: 0.55 K/UL (ref 0.1–1.2)
MONOCYTES NFR BLD: 9 % (ref 3–12)
MORPHOLOGY: ABNORMAL
MORPHOLOGY: ABNORMAL
NEUTROPHILS NFR BLD: 72 % (ref 36–65)
NEUTS SEG NFR BLD: 4.4 K/UL (ref 1.5–8.1)
NRBC BLD-RTO: 0 PER 100 WBC
O2 DELIVERY DEVICE: ABNORMAL
PHOSPHATE SERPL-MCNC: 3.1 MG/DL (ref 2.5–4.5)
PLATELET # BLD AUTO: 125 K/UL (ref 138–453)
PMV BLD AUTO: 12.7 FL (ref 8.1–13.5)
POC HCO3: 37.7 MMOL/L (ref 21–28)
POC O2 SATURATION: 94.4 % (ref 94–98)
POC PCO2: 60 MM HG (ref 35–48)
POC PH: 7.41 (ref 7.35–7.45)
POC PO2: 74.8 MM HG (ref 83–108)
POSITIVE BASE EXCESS, ART: 11.3 MMOL/L (ref 0–3)
POTASSIUM SERPL-SCNC: 3.8 MMOL/L (ref 3.7–5.3)
RBC # BLD AUTO: 3.14 M/UL (ref 4.21–5.77)
RBC # BLD: ABNORMAL 10*6/UL
SAMPLE SITE: ABNORMAL
SODIUM SERPL-SCNC: 145 MMOL/L (ref 135–144)
WBC OTHER # BLD: 6.1 K/UL (ref 3.5–11.3)

## 2023-11-08 PROCEDURE — 3609013300 HC EGD TUBE PLACEMENT: Performed by: SURGERY

## 2023-11-08 PROCEDURE — 83735 ASSAY OF MAGNESIUM: CPT

## 2023-11-08 PROCEDURE — 2580000003 HC RX 258

## 2023-11-08 PROCEDURE — 6360000002 HC RX W HCPCS

## 2023-11-08 PROCEDURE — 6370000000 HC RX 637 (ALT 250 FOR IP)

## 2023-11-08 PROCEDURE — 6370000000 HC RX 637 (ALT 250 FOR IP): Performed by: SURGERY

## 2023-11-08 PROCEDURE — 6360000002 HC RX W HCPCS: Performed by: STUDENT IN AN ORGANIZED HEALTH CARE EDUCATION/TRAINING PROGRAM

## 2023-11-08 PROCEDURE — 6370000000 HC RX 637 (ALT 250 FOR IP): Performed by: STUDENT IN AN ORGANIZED HEALTH CARE EDUCATION/TRAINING PROGRAM

## 2023-11-08 PROCEDURE — 2000000000 HC ICU R&B

## 2023-11-08 PROCEDURE — 2580000003 HC RX 258: Performed by: SURGERY

## 2023-11-08 PROCEDURE — 82947 ASSAY GLUCOSE BLOOD QUANT: CPT

## 2023-11-08 PROCEDURE — 82803 BLOOD GASES ANY COMBINATION: CPT

## 2023-11-08 PROCEDURE — 3700000001 HC ADD 15 MINUTES (ANESTHESIA): Performed by: SURGERY

## 2023-11-08 PROCEDURE — 94640 AIRWAY INHALATION TREATMENT: CPT

## 2023-11-08 PROCEDURE — 86901 BLOOD TYPING SEROLOGIC RH(D): CPT

## 2023-11-08 PROCEDURE — 2709999900 HC NON-CHARGEABLE SUPPLY: Performed by: SURGERY

## 2023-11-08 PROCEDURE — 0DH63UZ INSERTION OF FEEDING DEVICE INTO STOMACH, PERCUTANEOUS APPROACH: ICD-10-PCS | Performed by: SURGERY

## 2023-11-08 PROCEDURE — 2580000003 HC RX 258: Performed by: ANESTHESIOLOGY

## 2023-11-08 PROCEDURE — P9016 RBC LEUKOCYTES REDUCED: HCPCS

## 2023-11-08 PROCEDURE — 36415 COLL VENOUS BLD VENIPUNCTURE: CPT

## 2023-11-08 PROCEDURE — 84100 ASSAY OF PHOSPHORUS: CPT

## 2023-11-08 PROCEDURE — 3700000000 HC ANESTHESIA ATTENDED CARE: Performed by: SURGERY

## 2023-11-08 PROCEDURE — 71045 X-RAY EXAM CHEST 1 VIEW: CPT

## 2023-11-08 PROCEDURE — 85025 COMPLETE CBC W/AUTO DIFF WBC: CPT

## 2023-11-08 PROCEDURE — 2720000010 HC SURG SUPPLY STERILE: Performed by: SURGERY

## 2023-11-08 PROCEDURE — 2700000000 HC OXYGEN THERAPY PER DAY

## 2023-11-08 PROCEDURE — 86900 BLOOD TYPING SEROLOGIC ABO: CPT

## 2023-11-08 PROCEDURE — 3E0G76Z INTRODUCTION OF NUTRITIONAL SUBSTANCE INTO UPPER GI, VIA NATURAL OR ARTIFICIAL OPENING: ICD-10-PCS | Performed by: SURGERY

## 2023-11-08 PROCEDURE — 5A1935Z RESPIRATORY VENTILATION, LESS THAN 24 CONSECUTIVE HOURS: ICD-10-PCS | Performed by: SURGERY

## 2023-11-08 PROCEDURE — 86850 RBC ANTIBODY SCREEN: CPT

## 2023-11-08 PROCEDURE — 2500000003 HC RX 250 WO HCPCS: Performed by: STUDENT IN AN ORGANIZED HEALTH CARE EDUCATION/TRAINING PROGRAM

## 2023-11-08 PROCEDURE — 2580000003 HC RX 258: Performed by: STUDENT IN AN ORGANIZED HEALTH CARE EDUCATION/TRAINING PROGRAM

## 2023-11-08 PROCEDURE — 86920 COMPATIBILITY TEST SPIN: CPT

## 2023-11-08 PROCEDURE — 80048 BASIC METABOLIC PNL TOTAL CA: CPT

## 2023-11-08 PROCEDURE — 99232 SBSQ HOSP IP/OBS MODERATE 35: CPT | Performed by: SURGERY

## 2023-11-08 PROCEDURE — 94003 VENT MGMT INPAT SUBQ DAY: CPT

## 2023-11-08 PROCEDURE — 2500000003 HC RX 250 WO HCPCS

## 2023-11-08 PROCEDURE — 0B110F4 BYPASS TRACHEA TO CUTANEOUS WITH TRACHEOSTOMY DEVICE, OPEN APPROACH: ICD-10-PCS | Performed by: SURGERY

## 2023-11-08 PROCEDURE — 36600 WITHDRAWAL OF ARTERIAL BLOOD: CPT

## 2023-11-08 PROCEDURE — 99291 CRITICAL CARE FIRST HOUR: CPT | Performed by: INTERNAL MEDICINE

## 2023-11-08 PROCEDURE — 3600000030 HC TRACHEOSTOMY: Performed by: SURGERY

## 2023-11-08 RX ORDER — MEPERIDINE HYDROCHLORIDE 50 MG/ML
12.5 INJECTION INTRAMUSCULAR; INTRAVENOUS; SUBCUTANEOUS EVERY 5 MIN PRN
Status: DISCONTINUED | OUTPATIENT
Start: 2023-11-08 | End: 2023-11-09 | Stop reason: HOSPADM

## 2023-11-08 RX ORDER — METOCLOPRAMIDE HYDROCHLORIDE 5 MG/ML
10 INJECTION INTRAMUSCULAR; INTRAVENOUS
Status: ACTIVE | OUTPATIENT
Start: 2023-11-08 | End: 2023-11-09

## 2023-11-08 RX ORDER — MAGNESIUM HYDROXIDE 1200 MG/15ML
LIQUID ORAL CONTINUOUS PRN
Status: COMPLETED | OUTPATIENT
Start: 2023-11-08 | End: 2023-11-08

## 2023-11-08 RX ORDER — DIPHENHYDRAMINE HYDROCHLORIDE 50 MG/ML
12.5 INJECTION INTRAMUSCULAR; INTRAVENOUS
Status: ACTIVE | OUTPATIENT
Start: 2023-11-08 | End: 2023-11-09

## 2023-11-08 RX ORDER — HYDRALAZINE HYDROCHLORIDE 20 MG/ML
10 INJECTION INTRAMUSCULAR; INTRAVENOUS
Status: DISCONTINUED | OUTPATIENT
Start: 2023-11-08 | End: 2023-11-09 | Stop reason: HOSPADM

## 2023-11-08 RX ORDER — NOREPINEPHRINE BITARTRATE 0.06 MG/ML
1-100 INJECTION, SOLUTION INTRAVENOUS CONTINUOUS
Status: DISCONTINUED | OUTPATIENT
Start: 2023-11-08 | End: 2023-11-09

## 2023-11-08 RX ORDER — SODIUM CHLORIDE 0.9 % (FLUSH) 0.9 %
5-40 SYRINGE (ML) INJECTION PRN
Status: DISCONTINUED | OUTPATIENT
Start: 2023-11-08 | End: 2023-11-09 | Stop reason: HOSPADM

## 2023-11-08 RX ORDER — SODIUM CHLORIDE 0.9 % (FLUSH) 0.9 %
5-40 SYRINGE (ML) INJECTION EVERY 12 HOURS SCHEDULED
Status: DISCONTINUED | OUTPATIENT
Start: 2023-11-08 | End: 2023-11-09

## 2023-11-08 RX ORDER — DROPERIDOL 2.5 MG/ML
0.62 INJECTION, SOLUTION INTRAMUSCULAR; INTRAVENOUS
Status: DISPENSED | OUTPATIENT
Start: 2023-11-08 | End: 2023-11-09

## 2023-11-08 RX ORDER — SODIUM CHLORIDE, SODIUM LACTATE, POTASSIUM CHLORIDE, CALCIUM CHLORIDE 600; 310; 30; 20 MG/100ML; MG/100ML; MG/100ML; MG/100ML
INJECTION, SOLUTION INTRAVENOUS CONTINUOUS PRN
Status: DISCONTINUED | OUTPATIENT
Start: 2023-11-08 | End: 2023-11-08 | Stop reason: SDUPTHER

## 2023-11-08 RX ORDER — HEPARIN SODIUM 5000 [USP'U]/ML
5000 INJECTION, SOLUTION INTRAVENOUS; SUBCUTANEOUS EVERY 8 HOURS SCHEDULED
Status: DISCONTINUED | OUTPATIENT
Start: 2023-11-08 | End: 2023-11-09 | Stop reason: HOSPADM

## 2023-11-08 RX ORDER — SODIUM CHLORIDE 9 MG/ML
INJECTION, SOLUTION INTRAVENOUS PRN
Status: DISCONTINUED | OUTPATIENT
Start: 2023-11-08 | End: 2023-11-09 | Stop reason: HOSPADM

## 2023-11-08 RX ORDER — ROCURONIUM BROMIDE 10 MG/ML
INJECTION, SOLUTION INTRAVENOUS PRN
Status: DISCONTINUED | OUTPATIENT
Start: 2023-11-08 | End: 2023-11-08 | Stop reason: SDUPTHER

## 2023-11-08 RX ORDER — CEFAZOLIN SODIUM 1 G/3ML
INJECTION, POWDER, FOR SOLUTION INTRAMUSCULAR; INTRAVENOUS PRN
Status: DISCONTINUED | OUTPATIENT
Start: 2023-11-08 | End: 2023-11-08 | Stop reason: SDUPTHER

## 2023-11-08 RX ORDER — ULTRASOUND COUPLING MEDIUM
GEL (GRAM) TOPICAL PRN
Status: DISCONTINUED | OUTPATIENT
Start: 2023-11-08 | End: 2023-11-08 | Stop reason: ALTCHOICE

## 2023-11-08 RX ORDER — SODIUM CHLORIDE, SODIUM LACTATE, POTASSIUM CHLORIDE, AND CALCIUM CHLORIDE .6; .31; .03; .02 G/100ML; G/100ML; G/100ML; G/100ML
500 INJECTION, SOLUTION INTRAVENOUS ONCE
Status: DISCONTINUED | OUTPATIENT
Start: 2023-11-08 | End: 2023-11-08

## 2023-11-08 RX ORDER — FENTANYL CITRATE 50 UG/ML
INJECTION, SOLUTION INTRAMUSCULAR; INTRAVENOUS PRN
Status: DISCONTINUED | OUTPATIENT
Start: 2023-11-08 | End: 2023-11-08 | Stop reason: SDUPTHER

## 2023-11-08 RX ADMIN — ALBUTEROL SULFATE 2.5 MG: 2.5 SOLUTION RESPIRATORY (INHALATION) at 23:42

## 2023-11-08 RX ADMIN — IPRATROPIUM BROMIDE AND ALBUTEROL SULFATE 1 DOSE: 2.5; .5 SOLUTION RESPIRATORY (INHALATION) at 16:35

## 2023-11-08 RX ADMIN — FENTANYL CITRATE 50 MCG: 50 INJECTION, SOLUTION INTRAMUSCULAR; INTRAVENOUS at 12:14

## 2023-11-08 RX ADMIN — ASPIRIN 81 MG: 81 TABLET, CHEWABLE ORAL at 08:06

## 2023-11-08 RX ADMIN — MIDODRINE HYDROCHLORIDE 15 MG: 5 TABLET ORAL at 19:57

## 2023-11-08 RX ADMIN — HEPARIN SODIUM 5000 UNITS: 5000 INJECTION INTRAVENOUS; SUBCUTANEOUS at 19:59

## 2023-11-08 RX ADMIN — OXYCODONE AND ACETAMINOPHEN 1 TABLET: 5; 325 TABLET ORAL at 22:36

## 2023-11-08 RX ADMIN — SODIUM CHLORIDE, PRESERVATIVE FREE 10 ML: 5 INJECTION INTRAVENOUS at 19:57

## 2023-11-08 RX ADMIN — PROPOFOL 15 MCG/KG/MIN: 10 INJECTION, EMULSION INTRAVENOUS at 05:10

## 2023-11-08 RX ADMIN — ROCURONIUM BROMIDE 50 MG: 10 INJECTION, SOLUTION INTRAVENOUS at 11:30

## 2023-11-08 RX ADMIN — SODIUM CHLORIDE, POTASSIUM CHLORIDE, SODIUM LACTATE AND CALCIUM CHLORIDE 500 ML: 600; 310; 30; 20 INJECTION, SOLUTION INTRAVENOUS at 03:50

## 2023-11-08 RX ADMIN — ALBUTEROL SULFATE 2.5 MG: 2.5 SOLUTION RESPIRATORY (INHALATION) at 03:20

## 2023-11-08 RX ADMIN — SODIUM CHLORIDE, POTASSIUM CHLORIDE, SODIUM LACTATE AND CALCIUM CHLORIDE: 600; 310; 30; 20 INJECTION, SOLUTION INTRAVENOUS at 11:28

## 2023-11-08 RX ADMIN — PHENYLEPHRINE HYDROCHLORIDE 200 MCG: 10 INJECTION INTRAVENOUS at 12:53

## 2023-11-08 RX ADMIN — Medication 5 MCG/MIN: at 04:15

## 2023-11-08 RX ADMIN — Medication 175 MCG/HR: at 23:21

## 2023-11-08 RX ADMIN — BUMETANIDE 1 MG: 1 TABLET ORAL at 08:07

## 2023-11-08 RX ADMIN — IPRATROPIUM BROMIDE AND ALBUTEROL SULFATE 1 DOSE: 2.5; .5 SOLUTION RESPIRATORY (INHALATION) at 08:15

## 2023-11-08 RX ADMIN — SODIUM CHLORIDE, PRESERVATIVE FREE 10 ML: 5 INJECTION INTRAVENOUS at 08:22

## 2023-11-08 RX ADMIN — FENTANYL CITRATE 50 MCG: 50 INJECTION, SOLUTION INTRAMUSCULAR; INTRAVENOUS at 11:50

## 2023-11-08 RX ADMIN — OXYCODONE AND ACETAMINOPHEN 1 TABLET: 5; 325 TABLET ORAL at 05:11

## 2023-11-08 RX ADMIN — LEVOTHYROXINE SODIUM 200 MCG: 100 TABLET ORAL at 08:07

## 2023-11-08 RX ADMIN — IPRATROPIUM BROMIDE AND ALBUTEROL SULFATE 1 DOSE: 2.5; .5 SOLUTION RESPIRATORY (INHALATION) at 20:14

## 2023-11-08 RX ADMIN — CEFAZOLIN 3 G: 1 INJECTION, POWDER, FOR SOLUTION INTRAMUSCULAR; INTRAVENOUS at 11:36

## 2023-11-08 RX ADMIN — FAMOTIDINE 20 MG: 20 TABLET, FILM COATED ORAL at 19:57

## 2023-11-08 RX ADMIN — SODIUM CHLORIDE, PRESERVATIVE FREE 10 ML: 5 INJECTION INTRAVENOUS at 19:56

## 2023-11-08 RX ADMIN — CITALOPRAM HYDROBROMIDE 30 MG: 10 TABLET ORAL at 08:07

## 2023-11-08 RX ADMIN — TRAZODONE HYDROCHLORIDE 75 MG: 50 TABLET ORAL at 19:57

## 2023-11-08 RX ADMIN — FAMOTIDINE 20 MG: 20 TABLET, FILM COATED ORAL at 08:06

## 2023-11-08 RX ADMIN — MIDODRINE HYDROCHLORIDE 15 MG: 5 TABLET ORAL at 01:29

## 2023-11-08 RX ADMIN — PROPOFOL 10 MCG/KG/MIN: 10 INJECTION, EMULSION INTRAVENOUS at 23:22

## 2023-11-08 RX ADMIN — DOCUSATE SODIUM LIQUID 100 MG: 100 LIQUID ORAL at 19:57

## 2023-11-08 RX ADMIN — AMIODARONE HYDROCHLORIDE 200 MG: 200 TABLET ORAL at 08:06

## 2023-11-08 RX ADMIN — DOCUSATE SODIUM LIQUID 100 MG: 100 LIQUID ORAL at 08:06

## 2023-11-08 RX ADMIN — MIDODRINE HYDROCHLORIDE 15 MG: 5 TABLET ORAL at 08:06

## 2023-11-08 RX ADMIN — CLONAZEPAM 1 MG: 1 TABLET ORAL at 23:30

## 2023-11-08 RX ADMIN — Medication 175 MCG/HR: at 14:36

## 2023-11-08 RX ADMIN — AMIODARONE HYDROCHLORIDE 200 MG: 200 TABLET ORAL at 19:57

## 2023-11-08 RX ADMIN — PHENYLEPHRINE HYDROCHLORIDE 100 MCG: 10 INJECTION INTRAVENOUS at 11:58

## 2023-11-08 RX ADMIN — OXYCODONE AND ACETAMINOPHEN 1 TABLET: 5; 325 TABLET ORAL at 01:29

## 2023-11-08 RX ADMIN — PROPOFOL 25 MCG/KG/MIN: 10 INJECTION, EMULSION INTRAVENOUS at 08:51

## 2023-11-08 RX ADMIN — ROCURONIUM BROMIDE 30 MG: 10 INJECTION, SOLUTION INTRAVENOUS at 12:32

## 2023-11-08 ASSESSMENT — PULMONARY FUNCTION TESTS
PIF_VALUE: 37
PIF_VALUE: 34
PIF_VALUE: 44
PIF_VALUE: 35
PIF_VALUE: 40
PIF_VALUE: 27
PIF_VALUE: 31
PIF_VALUE: 35
PIF_VALUE: 35
PIF_VALUE: 34
PIF_VALUE: 33
PIF_VALUE: 35
PIF_VALUE: 33
PIF_VALUE: 34
PIF_VALUE: 35
PIF_VALUE: 36
PIF_VALUE: 29
PIF_VALUE: 38
PIF_VALUE: 36
PIF_VALUE: 35
PIF_VALUE: 34
PIF_VALUE: 28
PIF_VALUE: 37
PIF_VALUE: 28
PIF_VALUE: 33
PIF_VALUE: 39
PIF_VALUE: 34
PIF_VALUE: 28
PIF_VALUE: 32
PIF_VALUE: 37
PIF_VALUE: 34
PIF_VALUE: 29
PIF_VALUE: 30
PIF_VALUE: 29
PIF_VALUE: 39
PIF_VALUE: 30
PIF_VALUE: 36
PIF_VALUE: 37
PIF_VALUE: 35
PIF_VALUE: 35
PIF_VALUE: 38
PIF_VALUE: 35
PIF_VALUE: 31
PIF_VALUE: 31
PIF_VALUE: 35
PIF_VALUE: 37
PIF_VALUE: 39

## 2023-11-08 ASSESSMENT — PAIN SCALES - GENERAL
PAINLEVEL_OUTOF10: 0

## 2023-11-08 NOTE — OP NOTE
Brief Postoperative Note      Patient: Rosa Elena Westbrook  YOB: 1965  MRN: [id number]    Date of Procedure: 11/8/2023    Pre-Op Diagnosis Codes:     * Respiratory failure, unspecified chronicity, unspecified whether with hypoxia or hypercapnia ([address]) [J96.90]     * Dysphagia, unspecified type [R13.10]    Post-Op Diagnosis: Same       Procedure(s):  TRACHEOTOMY  EGD ESOPHAGOGASTRODUODENOSCOPY PEG TUBE INSERTION    Surgeon(s):  Sarah Peralta MD    Assistant:  Resident: Miranda Van DO; Cat Yoo DO    Anesthesia: General    Estimated Blood Loss (mL): 53EX    Complications: None    Specimens:   * No specimens in log *    Implants:  * No implants in log *      Drains:   NG/OG/NJ/NE Tube Orogastric 16 fr Center mouth (Active)   Surrounding Skin Clean, dry & intact 11/08/23 0800   Securement device Tape 11/08/23 0800   Status Clamped 11/08/23 0800   Placement Verified External Catheter Length 11/08/23 0800   NG/OG/NJ/NE External Measurement (cm) 60 cm 11/08/23 0800   Drainage Appearance Bile 11/08/23 0800   Tube Feeding Diabetic 11/08/23 0800   Tube feeding/verify rate (mL/hr) 30 mL/hr 11/07/23 1600   Tube Feeding Supplement (Product) Protein Modular 11/08/23 0800   Tube Feeding Intake (mL) 53 ml 11/07/23 1200   Free Water/Flush (mL) 350 mL 11/07/23 1300   Action Taken Feed set changed; Tubing changed 11/08/23 0800       Gastrostomy/Enterostomy/Jejunostomy Tube Percutaneous Endoscopic Gastrostomy (PEG) 20 fr (Active)       Urinary Catheter 10/19/23 Clemons-Temperature (Active)   Catheter Indications Urinary retention (acute or chronic), continuous bladder irrigation or bladder outlet obstruction 11/08/23 0800   Site Assessment No urethral drainage 11/08/23 0800   Urine Color Yellow 11/08/23 0800   Urine Appearance Clear 11/08/23 0800   Urine Odor Malodorous 11/08/23 0800   Collection Container Standard 11/08/23 0800   Securement Method Leg strap 11/08/23 0800   Catheter Care  Soap and water 0 (no pain/absence of nonverbal indicators of pain)

## 2023-11-09 ENCOUNTER — HOSPITAL ENCOUNTER (OUTPATIENT)
Dept: ICU | Age: 58
Discharge: HOME | End: 2023-11-09
Attending: INTERNAL MEDICINE | Admitting: INTERNAL MEDICINE

## 2023-11-09 VITALS
HEART RATE: 84 BPM | BODY MASS INDEX: 44.1 KG/M2 | OXYGEN SATURATION: 100 % | HEIGHT: 71 IN | WEIGHT: 315 LBS | DIASTOLIC BLOOD PRESSURE: 47 MMHG | RESPIRATION RATE: 19 BRPM | SYSTOLIC BLOOD PRESSURE: 85 MMHG | TEMPERATURE: 98.2 F

## 2023-11-09 LAB
ABO/RH: NORMAL
ALLEN TEST: POSITIVE
ANION GAP SERPL CALCULATED.3IONS-SCNC: 12 MMOL/L (ref 9–17)
ANTIBODY SCREEN: NEGATIVE
ARM BAND NUMBER: NORMAL
BASOPHILS # BLD: 0 K/UL (ref 0–0.2)
BASOPHILS NFR BLD: 0 % (ref 0–2)
BLOOD BANK BLOOD PRODUCT EXPIRATION DATE: NORMAL
BLOOD BANK DISPENSE STATUS: NORMAL
BLOOD BANK ISBT PRODUCT BLOOD TYPE: 6200
BLOOD BANK PRODUCT CODE: NORMAL
BLOOD BANK SAMPLE EXPIRATION: NORMAL
BLOOD BANK UNIT TYPE AND RH: NORMAL
BPU ID: NORMAL
BUN SERPL-MCNC: 16 MG/DL (ref 6–20)
CALCIUM SERPL-MCNC: 8.1 MG/DL (ref 8.6–10.4)
CHLORIDE SERPL-SCNC: 103 MMOL/L (ref 98–107)
CO2 SERPL-SCNC: 29 MMOL/L (ref 20–31)
COMPONENT: NORMAL
CREAT SERPL-MCNC: 2.2 MG/DL (ref 0.7–1.2)
CROSSMATCH RESULT: NORMAL
EOSINOPHIL # BLD: 0 K/UL (ref 0–0.4)
EOSINOPHILS RELATIVE PERCENT: 0 % (ref 1–4)
ERYTHROCYTE [DISTWIDTH] IN BLOOD BY AUTOMATED COUNT: 19.9 % (ref 11.8–14.4)
FIO2: 35
GFR SERPL CREATININE-BSD FRML MDRD: 34 ML/MIN/1.73M2
GLUCOSE BLD-MCNC: 121 MG/DL (ref 75–110)
GLUCOSE BLD-MCNC: 124 MG/DL (ref 74–100)
GLUCOSE BLD-MCNC: 133 MG/DL (ref 75–110)
GLUCOSE SERPL-MCNC: 118 MG/DL (ref 70–99)
HCT VFR BLD AUTO: 28.7 % (ref 40.7–50.3)
HGB BLD-MCNC: 8.3 G/DL (ref 13–17)
IMM GRANULOCYTES # BLD AUTO: 0 K/UL (ref 0–0.3)
IMM GRANULOCYTES NFR BLD: 0 %
LYMPHOCYTES NFR BLD: 0.8 K/UL (ref 1–4.8)
LYMPHOCYTES RELATIVE PERCENT: 11 % (ref 24–44)
MCH RBC QN AUTO: 27.2 PG (ref 25.2–33.5)
MCHC RBC AUTO-ENTMCNC: 28.9 G/DL (ref 28.4–34.8)
MCV RBC AUTO: 94.1 FL (ref 82.6–102.9)
MODE: ABNORMAL
MONOCYTES NFR BLD: 0.51 K/UL (ref 0.1–0.8)
MONOCYTES NFR BLD: 7 % (ref 1–7)
MORPHOLOGY: ABNORMAL
MORPHOLOGY: ABNORMAL
NEUTROPHILS NFR BLD: 82 % (ref 36–66)
NEUTS SEG NFR BLD: 5.99 K/UL (ref 1.8–7.7)
NRBC BLD-RTO: 0 PER 100 WBC
O2 DELIVERY DEVICE: ABNORMAL
PHOSPHATE SERPL-MCNC: 3.4 MG/DL (ref 2.5–4.5)
PLATELET # BLD AUTO: 110 K/UL (ref 138–453)
PMV BLD AUTO: 12.6 FL (ref 8.1–13.5)
POC HCO3: 35.3 MMOL/L (ref 21–28)
POC O2 SATURATION: 95 % (ref 94–98)
POC PCO2: 54.6 MM HG (ref 35–48)
POC PH: 7.42 (ref 7.35–7.45)
POC PO2: 76.6 MM HG (ref 83–108)
POSITIVE BASE EXCESS, ART: 9.4 MMOL/L (ref 0–3)
POTASSIUM SERPL-SCNC: 3.7 MMOL/L (ref 3.7–5.3)
RBC # BLD AUTO: 3.05 M/UL (ref 4.21–5.77)
SAMPLE SITE: ABNORMAL
SODIUM SERPL-SCNC: 144 MMOL/L (ref 135–144)
TRANSFUSION STATUS: NORMAL
UNIT DIVISION: 0
UNIT ISSUE DATE/TIME: NORMAL
WBC OTHER # BLD: 7.3 K/UL (ref 3.5–11.3)

## 2023-11-09 PROCEDURE — 84100 ASSAY OF PHOSPHORUS: CPT

## 2023-11-09 PROCEDURE — 94003 VENT MGMT INPAT SUBQ DAY: CPT

## 2023-11-09 PROCEDURE — 36600 WITHDRAWAL OF ARTERIAL BLOOD: CPT

## 2023-11-09 PROCEDURE — 99291 CRITICAL CARE FIRST HOUR: CPT | Performed by: INTERNAL MEDICINE

## 2023-11-09 PROCEDURE — 36415 COLL VENOUS BLD VENIPUNCTURE: CPT

## 2023-11-09 PROCEDURE — 6370000000 HC RX 637 (ALT 250 FOR IP): Performed by: STUDENT IN AN ORGANIZED HEALTH CARE EDUCATION/TRAINING PROGRAM

## 2023-11-09 PROCEDURE — 80048 BASIC METABOLIC PNL TOTAL CA: CPT

## 2023-11-09 PROCEDURE — 82803 BLOOD GASES ANY COMBINATION: CPT

## 2023-11-09 PROCEDURE — 82947 ASSAY GLUCOSE BLOOD QUANT: CPT

## 2023-11-09 PROCEDURE — 2580000003 HC RX 258: Performed by: STUDENT IN AN ORGANIZED HEALTH CARE EDUCATION/TRAINING PROGRAM

## 2023-11-09 PROCEDURE — 2700000000 HC OXYGEN THERAPY PER DAY

## 2023-11-09 PROCEDURE — 6360000002 HC RX W HCPCS: Performed by: STUDENT IN AN ORGANIZED HEALTH CARE EDUCATION/TRAINING PROGRAM

## 2023-11-09 PROCEDURE — 85025 COMPLETE CBC W/AUTO DIFF WBC: CPT

## 2023-11-09 PROCEDURE — 94761 N-INVAS EAR/PLS OXIMETRY MLT: CPT

## 2023-11-09 PROCEDURE — 6360000002 HC RX W HCPCS

## 2023-11-09 PROCEDURE — 94640 AIRWAY INHALATION TREATMENT: CPT

## 2023-11-09 RX ORDER — LEVOTHYROXINE SODIUM 0.2 MG/1
200 TABLET ORAL DAILY
Qty: 30 TABLET | Refills: 3 | Status: SHIPPED | OUTPATIENT
Start: 2023-11-10

## 2023-11-09 RX ORDER — GINSENG 100 MG
CAPSULE ORAL
Qty: 14.2 G | Refills: 3 | Status: SHIPPED | OUTPATIENT
Start: 2023-11-09 | End: 2023-11-19

## 2023-11-09 RX ORDER — CITALOPRAM HYDROBROMIDE 10 MG/1
30 TABLET ORAL DAILY
Qty: 30 TABLET | Refills: 3 | Status: SHIPPED | OUTPATIENT
Start: 2023-11-10

## 2023-11-09 RX ORDER — ACETAMINOPHEN 325 MG/1
650 TABLET ORAL EVERY 6 HOURS PRN
Qty: 120 TABLET | Refills: 3 | Status: SHIPPED | OUTPATIENT
Start: 2023-11-09

## 2023-11-09 RX ORDER — HYDROXYZINE HYDROCHLORIDE 25 MG/1
25 TABLET, FILM COATED ORAL EVERY 6 HOURS PRN
Qty: 30 TABLET | Refills: 3 | Status: SHIPPED | OUTPATIENT
Start: 2023-11-09 | End: 2023-12-09

## 2023-11-09 RX ORDER — SODIUM CHLORIDE 450 MG/100ML
INJECTION, SOLUTION INTRAVENOUS CONTINUOUS
Status: DISCONTINUED | OUTPATIENT
Start: 2023-11-09 | End: 2023-11-09

## 2023-11-09 RX ORDER — FAMOTIDINE 20 MG/1
20 TABLET, FILM COATED ORAL 2 TIMES DAILY
Qty: 60 TABLET | Refills: 3 | Status: SHIPPED | OUTPATIENT
Start: 2023-11-09

## 2023-11-09 RX ORDER — LANOLIN ALCOHOL/MO/W.PET/CERES
400 CREAM (GRAM) TOPICAL DAILY
Qty: 30 TABLET | Refills: 3 | Status: SHIPPED | OUTPATIENT
Start: 2023-11-09

## 2023-11-09 RX ORDER — AMIODARONE HYDROCHLORIDE 200 MG/1
200 TABLET ORAL 2 TIMES DAILY
Qty: 60 TABLET | Refills: 3 | Status: SHIPPED | OUTPATIENT
Start: 2023-11-09 | End: 2024-03-08

## 2023-11-09 RX ORDER — LANOLIN ALCOHOL/MO/W.PET/CERES
325 CREAM (GRAM) TOPICAL
Qty: 90 TABLET | Refills: 3 | Status: SHIPPED | OUTPATIENT
Start: 2023-11-09

## 2023-11-09 RX ORDER — CLONAZEPAM 1 MG/1
1 TABLET ORAL EVERY 12 HOURS
Qty: 28 TABLET | Refills: 0 | Status: SHIPPED | OUTPATIENT
Start: 2023-11-10 | End: 2023-11-24

## 2023-11-09 RX ORDER — TRAZODONE HYDROCHLORIDE 50 MG/1
75 TABLET ORAL NIGHTLY
Qty: 45 TABLET | Refills: 2 | Status: SHIPPED | OUTPATIENT
Start: 2023-11-09 | End: 2024-02-07

## 2023-11-09 RX ORDER — MIDODRINE HYDROCHLORIDE 5 MG/1
15 TABLET ORAL EVERY 6 HOURS
Qty: 90 TABLET | Refills: 3 | Status: SHIPPED | OUTPATIENT
Start: 2023-11-09

## 2023-11-09 RX ORDER — BUMETANIDE 1 MG/1
1 TABLET ORAL 2 TIMES DAILY
Qty: 30 TABLET | Refills: 3 | Status: SHIPPED | OUTPATIENT
Start: 2023-11-09

## 2023-11-09 RX ORDER — ALBUTEROL SULFATE 2.5 MG/3ML
2.5 SOLUTION RESPIRATORY (INHALATION) EVERY 4 HOURS PRN
Qty: 120 EACH | Refills: 3 | Status: SHIPPED | OUTPATIENT
Start: 2023-11-09

## 2023-11-09 RX ADMIN — OXYCODONE AND ACETAMINOPHEN 1 TABLET: 5; 325 TABLET ORAL at 09:44

## 2023-11-09 RX ADMIN — OXYCODONE AND ACETAMINOPHEN 1 TABLET: 5; 325 TABLET ORAL at 14:42

## 2023-11-09 RX ADMIN — FAMOTIDINE 20 MG: 20 TABLET, FILM COATED ORAL at 09:40

## 2023-11-09 RX ADMIN — OXYCODONE AND ACETAMINOPHEN 1 TABLET: 5; 325 TABLET ORAL at 01:54

## 2023-11-09 RX ADMIN — CLONAZEPAM 1 MG: 1 TABLET ORAL at 12:32

## 2023-11-09 RX ADMIN — BACITRACIN: 500 OINTMENT TOPICAL at 00:29

## 2023-11-09 RX ADMIN — IPRATROPIUM BROMIDE AND ALBUTEROL SULFATE 1 DOSE: 2.5; .5 SOLUTION RESPIRATORY (INHALATION) at 11:23

## 2023-11-09 RX ADMIN — ALBUTEROL SULFATE 2.5 MG: 2.5 SOLUTION RESPIRATORY (INHALATION) at 03:07

## 2023-11-09 RX ADMIN — LEVOTHYROXINE SODIUM 200 MCG: 100 TABLET ORAL at 09:42

## 2023-11-09 RX ADMIN — IPRATROPIUM BROMIDE AND ALBUTEROL SULFATE 1 DOSE: 2.5; .5 SOLUTION RESPIRATORY (INHALATION) at 07:57

## 2023-11-09 RX ADMIN — MIDODRINE HYDROCHLORIDE 15 MG: 5 TABLET ORAL at 09:43

## 2023-11-09 RX ADMIN — SODIUM CHLORIDE, PRESERVATIVE FREE 10 ML: 5 INJECTION INTRAVENOUS at 09:00

## 2023-11-09 RX ADMIN — HEPARIN SODIUM 5000 UNITS: 5000 INJECTION INTRAVENOUS; SUBCUTANEOUS at 14:41

## 2023-11-09 RX ADMIN — MIDODRINE HYDROCHLORIDE 15 MG: 5 TABLET ORAL at 14:41

## 2023-11-09 RX ADMIN — MIDODRINE HYDROCHLORIDE 15 MG: 5 TABLET ORAL at 01:54

## 2023-11-09 RX ADMIN — HEPARIN SODIUM 5000 UNITS: 5000 INJECTION INTRAVENOUS; SUBCUTANEOUS at 05:34

## 2023-11-09 RX ADMIN — ASPIRIN 81 MG: 81 TABLET, CHEWABLE ORAL at 09:36

## 2023-11-09 RX ADMIN — BUMETANIDE 1 MG: 1 TABLET ORAL at 09:38

## 2023-11-09 RX ADMIN — CITALOPRAM HYDROBROMIDE 30 MG: 10 TABLET ORAL at 09:37

## 2023-11-09 RX ADMIN — DOCUSATE SODIUM LIQUID 100 MG: 100 LIQUID ORAL at 09:39

## 2023-11-09 RX ADMIN — IPRATROPIUM BROMIDE AND ALBUTEROL SULFATE 1 DOSE: 2.5; .5 SOLUTION RESPIRATORY (INHALATION) at 15:58

## 2023-11-09 ASSESSMENT — PAIN SCALES - GENERAL
PAINLEVEL_OUTOF10: 0

## 2023-11-09 ASSESSMENT — PULMONARY FUNCTION TESTS
PIF_VALUE: 28
PIF_VALUE: 29
PIF_VALUE: 32
PIF_VALUE: 30
PIF_VALUE: 32
PIF_VALUE: 33
PIF_VALUE: 29
PIF_VALUE: 26
PIF_VALUE: 27
PIF_VALUE: 27

## 2023-11-09 NOTE — DISCHARGE INSTR - COC
Continuity of Care Form    Patient Name: Ankit Severino   :  1965  MRN:  [id number]    Admit date:  10/19/2023  Discharge date:   2023      Code Status Order: Full Code   Advance Directives:     Admitting Physician:  Guerline Cole MD  PCP: Rmao Valdez MD    Discharging Nurse: Kasandra [facility] Unit/Room#: 0444/7005-53  Discharging Unit Phone Number: [phone]      Emergency Contact:   Extended Emergency Contact Information  Primary Emergency Contact: Trevon Caller of [address] [place] Phone: [phone]  Work Phone: [phone]  Mobile Phone: [phone]  Relation: Child  Secondary Emergency Contact: [place] of [address] [place] Phone: [phone]  Relation: Child    Past Surgical History:  Past Surgical History:   Procedure Laterality Date    ABDOMEN SURGERY      hernia repair x4 (ventral)    COLONOSCOPY          COLONOSCOPY N/A 2023    COLONOSCOPY DIAGNOSTIC performed by Rosa Varela MD at [address]  2023    COLONOSCOPY N/A 2023    COLONOSCOPY DIAGNOSTIC performed by Rosa Varela MD at [address]  2018    CYSTOSCOPY  2019    CYSTOSCOPY N/A 2019    CYSTOSCOPY DILATION performed by James Bernard MD at [address] N/A 2019    CYSTOSCOPY/ DILATION NICOLE CATHETER EXCHANGE performed by James Bernard MD at [address] N/A 2022    CYSTOSCOPY, REMOVAL OF SMALL BLADDER STONE AND BLADDER IRRIGATION performed by James Bernard MD at [address] [facility], COLON, [address] [facility]  2018    repair and reduction of recurrent incarcerated incisional hernia with mesh    [place] CYSTOURETHROSCOPY N/A 2018    CYSTOSCOPY Diane Bitters performed by James Bernard MD at [address] EGD TRANSORAL BIOPSY SINGLE/MULTIPLE N/A 2017    EGD BIOPSY performed by Dionne Joy MD at [address] I&D [address] [address] [place]

## 2023-11-09 NOTE — CARE COORDINATION
Attempted to complete initial assessment. Patient intubated  FiO2 40%; PEEP 8  Fentanyl 125 mcg/hr IV  Propofol 12.5 mcg/kg/min IV  Versed 2 mg/hr IV  No family at bedside. Will attempt later as time permits.
Contacted son Graham Velazco to discuss LTAC placement. He declines written list. Son given verbal list of [facility] facilities with [facility] was offered time to contact facilities and evaluate, but declined. Referrals previously placed by another KADE Duval of referral to [place].
Transition Plan  Per IDR, Trach/peg is planned for Wednesday. Currently on Tube feedings NG. Yeimy Moore,  from [place] called for updates.   Conrado's contact number [phone]    I/S   FiO2 40%  PEEP 5    Continuous IV meds  Propofol 15 mcg/kg/hr  Fentanyl 125 mcg/hr
Transitional planning    Called Dilan Sheridan [phone] and left VM requesting return phone call. No patient information left on message. Called Didi Baez [phone] and left VM requesting return phone call. No patient information left on message.
Transitional planning    Called first emergency contact, Krunal Moy, son, [phone] and left  requesting return phone call. No patient information disclosed in message. Called second Emergency contact Sherrie [phone] and left  requesting call back. No paint information disclosed in message. Per notes patient came from [facility]. Previous CM note from August says patient discharged to [facility]. Errol Jamison [phone] and left  requesting call back.
Transitional planning    Plan TBD based on clinical progression, bedhold at [facility], palliative following, may need trach /peg, referral to [facility].
Transitional planning. I/S FiO2 30%, PEEP of 8, Gen-Surg consulted for Trach/PEG placement. [facility] is LTACH choice- [facility], Palliative Care following.
Transitional planning. I/S FiO2 35%, PEEP of 5, Trach/PEG today, LEVO gtt, follows commands. Spoke to [facility], they can take pt 11/9 if pt is able to be discharged to [facility].
Transitional planning. Sitter in room, A & O X 1, BiPAP Dependent, Eamon REDMOND choice- [facility], unable to place NG d/t decreased O2 Sats when off BiPAP. PT/OT/ Wound Ostomy ordered. ECHO ordered. Asking for Palliative Care Consult. May need Trach/PEG    Spoke to Cor[facility] with [facility]- pt is a long term, bed hold pt at facility.
Transitional planning. Trach/Vented FiO2 35%, PEEP 5, PEG for TF, follows commands. Confirmed a 6p-7p [address] , transport to [place] today with [facility] w/ [address] [place] [address]/ [facility]  Report: [facility]: [phone], [phone] provided report number [phone] to pt's RN, Marva Li informed pt's family.      Faxed AVS/KAROLYN to [phone]
Transitional planning: Plan remains return to [facility]. Extubated and on heated high fran 30L 45%.
altered mental status type [R41.82]  Acute on chronic respiratory failure with hypoxia and hypercapnia (HCC) [J96.21, J96.22]  Sepsis, due to unspecified organism, unspecified whether acute organ dysfunction present ([address]) [E28.2]    IF APPLICABLE: The Patient and/or patient representative Carmen Ambrosio and his family were provided with a choice of provider and agrees with the discharge plan. Freedom of choice list with basic dialogue that supports the patient's individualized plan of care/goals and shares the quality data associated with the providers was provided to: Patient Representative   Patient Representative Name: addis Houston     The Patient and/or Patient Representative Agree with the Discharge Plan?       Varinder Morales  Case Management Department  Ph: [phone] Fax:

## 2023-11-09 NOTE — DISCHARGE SUMMARY
capsule Comments:   Reason for Stopping:             Activity: activity as tolerated    Diet:  Peg feeding    Disposition: long term care facility    Follow-up:  in 1 weeks with Kurt Mccrary MD,      Electronically signed by Sundar Ng MD on 11/9/2023 at 2:35 PM     Time Spent on discharge is more than 30 minutes in the examination, evaluation, counseling and review of medications and discharge plan.       Sundar Ng MD  Family Medicine Resident  Critical Care Service

## 2023-11-10 LAB
25(OH)D3 SERPL-MCNC: 23.7 NG/ML (ref 30–100)
EST. AVERAGE GLUCOSE BLD GHB EST-MCNC: 123 MG/DL
HBA1C MFR BLD: 5.9 % (ref 4–6)
TSH SERPL DL<=0.05 MIU/L-ACNC: 9.98 UIU/ML (ref 0.27–4.2)

## 2023-11-10 PROCEDURE — 82306 VITAMIN D 25 HYDROXY: CPT

## 2023-11-10 PROCEDURE — 84443 ASSAY THYROID STIM HORMONE: CPT

## 2023-11-10 PROCEDURE — 83036 HEMOGLOBIN GLYCOSYLATED A1C: CPT

## 2023-11-11 LAB — TROPONIN I SERPL HS-MCNC: 55 NG/L (ref 0–22)

## 2023-11-11 PROCEDURE — 84484 ASSAY OF TROPONIN QUANT: CPT

## 2023-11-12 PROCEDURE — 84478 ASSAY OF TRIGLYCERIDES: CPT

## 2023-11-13 LAB — TRIGL SERPL-MCNC: 203 MG/DL

## 2023-11-15 LAB — POTASSIUM SERPL-SCNC: 4.3 MMOL/L (ref 3.7–5.3)

## 2023-11-17 LAB
FERRITIN SERPL-MCNC: 367 NG/ML (ref 30–400)
FOLATE SERPL-MCNC: 4.8 NG/ML
IRON SATN MFR SERPL: 35 % (ref 20–55)
IRON SERPL-MCNC: 44 UG/DL (ref 59–158)
MICROORGANISM SPEC CULT: ABNORMAL
MICROORGANISM/AGENT SPEC: ABNORMAL
SPECIMEN DESCRIPTION: ABNORMAL
TIBC SERPL-MCNC: 127 UG/DL (ref 250–450)
UNSATURATED IRON BINDING CAPACITY: 83 UG/DL (ref 112–347)
VIT B12 SERPL-MCNC: 1996 PG/ML (ref 232–1245)

## 2023-11-23 LAB
ABO/RH: NORMAL
ALBUMIN SERPL-MCNC: 2.2 G/DL (ref 3.5–5.2)
ALP SERPL-CCNC: 224 U/L (ref 40–129)
ALT SERPL-CCNC: 8 U/L (ref 5–41)
ANION GAP SERPL CALCULATED.3IONS-SCNC: 3 MMOL/L (ref 9–17)
ANION GAP SERPL CALCULATED.3IONS-SCNC: 4 MMOL/L (ref 9–17)
ANION GAP SERPL CALCULATED.3IONS-SCNC: 8 MMOL/L (ref 9–17)
ANTIBODY SCREEN: NEGATIVE
ARM BAND NUMBER: NORMAL
AST SERPL-CCNC: 23 U/L
BILIRUB SERPL-MCNC: 0.5 MG/DL (ref 0.3–1.2)
BLOOD BANK BLOOD PRODUCT EXPIRATION DATE: NORMAL
BLOOD BANK DISPENSE STATUS: NORMAL
BLOOD BANK ISBT PRODUCT BLOOD TYPE: 6200
BLOOD BANK PRODUCT CODE: NORMAL
BLOOD BANK SAMPLE EXPIRATION: NORMAL
BLOOD BANK UNIT TYPE AND RH: NORMAL
BPU ID: NORMAL
BUN SERPL-MCNC: 35 MG/DL (ref 6–20)
BUN/CREAT SERPL: 19 (ref 9–20)
BUN/CREAT SERPL: 21 (ref 9–20)
BUN/CREAT SERPL: 22 (ref 9–20)
CALCIUM SERPL-MCNC: 9.3 MG/DL (ref 8.6–10.4)
CALCIUM SERPL-MCNC: 9.4 MG/DL (ref 8.6–10.4)
CALCIUM SERPL-MCNC: 9.5 MG/DL (ref 8.6–10.4)
CHLORIDE SERPL-SCNC: 112 MMOL/L (ref 98–107)
CHLORIDE SERPL-SCNC: 112 MMOL/L (ref 98–107)
CHLORIDE SERPL-SCNC: 113 MMOL/L (ref 98–107)
CO2 SERPL-SCNC: 33 MMOL/L (ref 20–31)
CO2 SERPL-SCNC: 35 MMOL/L (ref 20–31)
CO2 SERPL-SCNC: 37 MMOL/L (ref 20–31)
COMPONENT: NORMAL
CREAT SERPL-MCNC: 1.6 MG/DL (ref 0.7–1.2)
CREAT SERPL-MCNC: 1.7 MG/DL (ref 0.7–1.2)
CREAT SERPL-MCNC: 1.8 MG/DL (ref 0.7–1.2)
CROSSMATCH RESULT: NORMAL
GFR SERPL CREATININE-BSD FRML MDRD: 43 ML/MIN/1.73M2
GFR SERPL CREATININE-BSD FRML MDRD: 46 ML/MIN/1.73M2
GFR SERPL CREATININE-BSD FRML MDRD: 49 ML/MIN/1.73M2
GLUCOSE SERPL-MCNC: 188 MG/DL (ref 70–99)
GLUCOSE SERPL-MCNC: 216 MG/DL (ref 70–99)
GLUCOSE SERPL-MCNC: 218 MG/DL (ref 70–99)
HGB BLD-MCNC: 7.6 G/DL (ref 13–17)
POTASSIUM SERPL-SCNC: 4.6 MMOL/L (ref 3.7–5.3)
POTASSIUM SERPL-SCNC: 5 MMOL/L (ref 3.7–5.3)
POTASSIUM SERPL-SCNC: 5.1 MMOL/L (ref 3.7–5.3)
PROT SERPL-MCNC: 5.7 G/DL (ref 6.4–8.3)
SODIUM SERPL-SCNC: 151 MMOL/L (ref 135–144)
SODIUM SERPL-SCNC: 153 MMOL/L (ref 135–144)
SODIUM SERPL-SCNC: 153 MMOL/L (ref 135–144)
TRANSFUSION STATUS: NORMAL
UNIT DIVISION: 0
UNIT ISSUE DATE/TIME: NORMAL

## 2023-11-24 LAB
ANION GAP SERPL CALCULATED.3IONS-SCNC: 4 MMOL/L (ref 9–17)
ANION GAP SERPL CALCULATED.3IONS-SCNC: 6 MMOL/L (ref 9–17)
BUN SERPL-MCNC: 33 MG/DL (ref 6–20)
BUN SERPL-MCNC: 34 MG/DL (ref 6–20)
BUN/CREAT SERPL: 21 (ref 9–20)
BUN/CREAT SERPL: 22 (ref 9–20)
CALCIUM SERPL-MCNC: 9.3 MG/DL (ref 8.6–10.4)
CALCIUM SERPL-MCNC: 9.6 MG/DL (ref 8.6–10.4)
CHLORIDE SERPL-SCNC: 107 MMOL/L (ref 98–107)
CHLORIDE SERPL-SCNC: 110 MMOL/L (ref 98–107)
CO2 SERPL-SCNC: 32 MMOL/L (ref 20–31)
CO2 SERPL-SCNC: 34 MMOL/L (ref 20–31)
CREAT SERPL-MCNC: 1.5 MG/DL (ref 0.7–1.2)
CREAT SERPL-MCNC: 1.6 MG/DL (ref 0.7–1.2)
ERYTHROCYTE [DISTWIDTH] IN BLOOD BY AUTOMATED COUNT: 22.2 % (ref 11.8–14.4)
GFR SERPL CREATININE-BSD FRML MDRD: 49 ML/MIN/1.73M2
GFR SERPL CREATININE-BSD FRML MDRD: 53 ML/MIN/1.73M2
GLUCOSE SERPL-MCNC: 197 MG/DL (ref 70–99)
GLUCOSE SERPL-MCNC: 217 MG/DL (ref 70–99)
HCT VFR BLD AUTO: 32 % (ref 40.7–50.3)
HGB BLD-MCNC: 8.7 G/DL (ref 13–17)
MCH RBC QN AUTO: 28.2 PG (ref 25.2–33.5)
MCHC RBC AUTO-ENTMCNC: 27.2 G/DL (ref 28.4–34.8)
MCV RBC AUTO: 103.9 FL (ref 82.6–102.9)
NRBC BLD-RTO: 0.5 PER 100 WBC
PLATELET # BLD AUTO: 203 K/UL (ref 138–453)
PMV BLD AUTO: 11.6 FL (ref 8.1–13.5)
POTASSIUM SERPL-SCNC: 4.8 MMOL/L (ref 3.7–5.3)
POTASSIUM SERPL-SCNC: 4.9 MMOL/L (ref 3.7–5.3)
RBC # BLD AUTO: 3.08 M/UL (ref 4.21–5.77)
SODIUM SERPL-SCNC: 145 MMOL/L (ref 135–144)
SODIUM SERPL-SCNC: 148 MMOL/L (ref 135–144)
WBC OTHER # BLD: 12.6 K/UL (ref 3.5–11.3)

## 2023-11-25 LAB
ANION GAP SERPL CALCULATED.3IONS-SCNC: 4 MMOL/L (ref 9–17)
ANION GAP SERPL CALCULATED.3IONS-SCNC: 4 MMOL/L (ref 9–17)
BUN SERPL-MCNC: 34 MG/DL (ref 6–20)
BUN SERPL-MCNC: 34 MG/DL (ref 6–20)
BUN/CREAT SERPL: 23 (ref 9–20)
BUN/CREAT SERPL: 23 (ref 9–20)
CALCIUM SERPL-MCNC: 9.2 MG/DL (ref 8.6–10.4)
CALCIUM SERPL-MCNC: 9.3 MG/DL (ref 8.6–10.4)
CHLORIDE SERPL-SCNC: 105 MMOL/L (ref 98–107)
CHLORIDE SERPL-SCNC: 107 MMOL/L (ref 98–107)
CO2 SERPL-SCNC: 34 MMOL/L (ref 20–31)
CO2 SERPL-SCNC: 34 MMOL/L (ref 20–31)
CREAT SERPL-MCNC: 1.5 MG/DL (ref 0.7–1.2)
CREAT SERPL-MCNC: 1.5 MG/DL (ref 0.7–1.2)
GFR SERPL CREATININE-BSD FRML MDRD: 53 ML/MIN/1.73M2
GFR SERPL CREATININE-BSD FRML MDRD: 53 ML/MIN/1.73M2
GLUCOSE SERPL-MCNC: 178 MG/DL (ref 70–99)
GLUCOSE SERPL-MCNC: 189 MG/DL (ref 70–99)
POTASSIUM SERPL-SCNC: 4.7 MMOL/L (ref 3.7–5.3)
POTASSIUM SERPL-SCNC: 4.7 MMOL/L (ref 3.7–5.3)
SODIUM SERPL-SCNC: 143 MMOL/L (ref 135–144)
SODIUM SERPL-SCNC: 145 MMOL/L (ref 135–144)

## 2023-11-27 LAB
ABO/RH: NORMAL
ANTIBODY SCREEN: NEGATIVE
ARM BAND NUMBER: NORMAL
BLOOD BANK DISPENSE STATUS: NORMAL
BLOOD BANK SAMPLE EXPIRATION: NORMAL
BPU ID: NORMAL
COMPONENT: NORMAL
CROSSMATCH RESULT: NORMAL
TRANSFUSION STATUS: NORMAL
UNIT DIVISION: 0

## 2023-11-29 LAB — HGB BLD-MCNC: 7.1 G/DL (ref 13–17)

## 2023-12-01 LAB
ERYTHROCYTE [DISTWIDTH] IN BLOOD BY AUTOMATED COUNT: 23.8 % (ref 11.8–14.4)
HCT VFR BLD AUTO: 28.8 % (ref 40.7–50.3)
HGB BLD-MCNC: 8.2 G/DL (ref 13–17)
MCH RBC QN AUTO: 29.1 PG (ref 25.2–33.5)
MCHC RBC AUTO-ENTMCNC: 28.5 G/DL (ref 28.4–34.8)
MCV RBC AUTO: 102.1 FL (ref 82.6–102.9)
NRBC BLD-RTO: 0.3 PER 100 WBC
PLATELET # BLD AUTO: 180 K/UL (ref 138–453)
PMV BLD AUTO: 12.2 FL (ref 8.1–13.5)
RBC # BLD AUTO: 2.82 M/UL (ref 4.21–5.77)
WBC OTHER # BLD: 7.5 K/UL (ref 3.5–11.3)

## 2023-12-04 LAB
ALBUMIN SERPL-MCNC: 2.1 G/DL (ref 3.5–5.2)
ALP SERPL-CCNC: 163 U/L (ref 40–129)
ALT SERPL-CCNC: 9 U/L (ref 5–41)
ANION GAP SERPL CALCULATED.3IONS-SCNC: 9 MMOL/L (ref 9–17)
AST SERPL-CCNC: 16 U/L
BILIRUB SERPL-MCNC: 0.6 MG/DL (ref 0.3–1.2)
BUN SERPL-MCNC: 32 MG/DL (ref 6–20)
BUN/CREAT SERPL: 25 (ref 9–20)
CALCIUM SERPL-MCNC: 8.5 MG/DL (ref 8.6–10.4)
CHLORIDE SERPL-SCNC: 102 MMOL/L (ref 98–107)
CO2 SERPL-SCNC: 35 MMOL/L (ref 20–31)
CREAT SERPL-MCNC: 1.3 MG/DL (ref 0.7–1.2)
ERYTHROCYTE [DISTWIDTH] IN BLOOD BY AUTOMATED COUNT: 22.8 % (ref 11.8–14.4)
GFR SERPL CREATININE-BSD FRML MDRD: >60 ML/MIN/1.73M2
GLUCOSE SERPL-MCNC: 153 MG/DL (ref 70–99)
HCT VFR BLD AUTO: 26.6 % (ref 40.7–50.3)
HGB BLD-MCNC: 7.6 G/DL (ref 13–17)
MAGNESIUM SERPL-MCNC: 1.9 MG/DL (ref 1.6–2.6)
MCH RBC QN AUTO: 28.9 PG (ref 25.2–33.5)
MCHC RBC AUTO-ENTMCNC: 28.6 G/DL (ref 28.4–34.8)
MCV RBC AUTO: 101.1 FL (ref 82.6–102.9)
NRBC BLD-RTO: 0.3 PER 100 WBC
PHOSPHATE SERPL-MCNC: 3.6 MG/DL (ref 2.5–4.5)
PLATELET # BLD AUTO: 181 K/UL (ref 138–453)
PMV BLD AUTO: 11.7 FL (ref 8.1–13.5)
POTASSIUM SERPL-SCNC: 4.1 MMOL/L (ref 3.7–5.3)
PROT SERPL-MCNC: 5.5 G/DL (ref 6.4–8.3)
RBC # BLD AUTO: 2.63 M/UL (ref 4.21–5.77)
SODIUM SERPL-SCNC: 146 MMOL/L (ref 135–144)
WBC OTHER # BLD: 6.8 K/UL (ref 3.5–11.3)

## 2023-12-06 LAB
ANION GAP SERPL CALCULATED.3IONS-SCNC: 6 MMOL/L (ref 9–17)
BUN SERPL-MCNC: 31 MG/DL (ref 6–20)
BUN/CREAT SERPL: 26 (ref 9–20)
CALCIUM SERPL-MCNC: 8.6 MG/DL (ref 8.6–10.4)
CHLORIDE SERPL-SCNC: 101 MMOL/L (ref 98–107)
CO2 SERPL-SCNC: 38 MMOL/L (ref 20–31)
CREAT SERPL-MCNC: 1.2 MG/DL (ref 0.7–1.2)
GFR SERPL CREATININE-BSD FRML MDRD: >60 ML/MIN/1.73M2
GLUCOSE SERPL-MCNC: 150 MG/DL (ref 70–99)
POTASSIUM SERPL-SCNC: 3.9 MMOL/L (ref 3.7–5.3)
SODIUM SERPL-SCNC: 145 MMOL/L (ref 135–144)

## 2023-12-07 LAB
ALBUMIN SERPL-MCNC: 2.2 G/DL (ref 3.5–5.2)
ALP SERPL-CCNC: 149 U/L (ref 40–129)
ALT SERPL-CCNC: 10 U/L (ref 5–41)
ANION GAP SERPL CALCULATED.3IONS-SCNC: 9 MMOL/L (ref 9–17)
AST SERPL-CCNC: 16 U/L
BILIRUB SERPL-MCNC: 0.6 MG/DL (ref 0.3–1.2)
BUN SERPL-MCNC: 31 MG/DL (ref 6–20)
BUN/CREAT SERPL: 24 (ref 9–20)
CALCIUM SERPL-MCNC: 8.7 MG/DL (ref 8.6–10.4)
CHLORIDE SERPL-SCNC: 99 MMOL/L (ref 98–107)
CO2 SERPL-SCNC: 37 MMOL/L (ref 20–31)
CREAT SERPL-MCNC: 1.3 MG/DL (ref 0.7–1.2)
ERYTHROCYTE [DISTWIDTH] IN BLOOD BY AUTOMATED COUNT: 21.9 % (ref 11.8–14.4)
GFR SERPL CREATININE-BSD FRML MDRD: >60 ML/MIN/1.73M2
GLUCOSE SERPL-MCNC: 183 MG/DL (ref 70–99)
HCT VFR BLD AUTO: 27.9 % (ref 40.7–50.3)
HGB BLD-MCNC: 8.2 G/DL (ref 13–17)
MCH RBC QN AUTO: 30 PG (ref 25.2–33.5)
MCHC RBC AUTO-ENTMCNC: 29.4 G/DL (ref 28.4–34.8)
MCV RBC AUTO: 102.2 FL (ref 82.6–102.9)
NRBC BLD-RTO: 0.3 PER 100 WBC
PLATELET # BLD AUTO: 212 K/UL (ref 138–453)
PMV BLD AUTO: 11.3 FL (ref 8.1–13.5)
POTASSIUM SERPL-SCNC: 3.9 MMOL/L (ref 3.7–5.3)
PROT SERPL-MCNC: 5.7 G/DL (ref 6.4–8.3)
RBC # BLD AUTO: 2.73 M/UL (ref 4.21–5.77)
SODIUM SERPL-SCNC: 145 MMOL/L (ref 135–144)
WBC OTHER # BLD: 7.8 K/UL (ref 3.5–11.3)

## 2023-12-09 LAB
ANION GAP SERPL CALCULATED.3IONS-SCNC: 8 MMOL/L (ref 9–17)
BUN SERPL-MCNC: 29 MG/DL (ref 6–20)
BUN/CREAT SERPL: 22 (ref 9–20)
CALCIUM SERPL-MCNC: 8.5 MG/DL (ref 8.6–10.4)
CHLORIDE SERPL-SCNC: 96 MMOL/L (ref 98–107)
CO2 SERPL-SCNC: 36 MMOL/L (ref 20–31)
CREAT SERPL-MCNC: 1.3 MG/DL (ref 0.7–1.2)
GFR SERPL CREATININE-BSD FRML MDRD: >60 ML/MIN/1.73M2
GLUCOSE SERPL-MCNC: 240 MG/DL (ref 70–99)
POTASSIUM SERPL-SCNC: 3.8 MMOL/L (ref 3.7–5.3)
SODIUM SERPL-SCNC: 140 MMOL/L (ref 135–144)

## 2023-12-11 LAB
ALBUMIN SERPL-MCNC: 2.2 G/DL (ref 3.5–5.2)
ALP SERPL-CCNC: 138 U/L (ref 40–129)
ALT SERPL-CCNC: 9 U/L (ref 5–41)
ANION GAP SERPL CALCULATED.3IONS-SCNC: 10 MMOL/L (ref 9–17)
AST SERPL-CCNC: 18 U/L
BILIRUB SERPL-MCNC: 0.6 MG/DL (ref 0.3–1.2)
BUN SERPL-MCNC: 29 MG/DL (ref 6–20)
BUN/CREAT SERPL: 22 (ref 9–20)
CALCIUM SERPL-MCNC: 8.3 MG/DL (ref 8.6–10.4)
CHLORIDE SERPL-SCNC: 94 MMOL/L (ref 98–107)
CO2 SERPL-SCNC: 34 MMOL/L (ref 20–31)
CREAT SERPL-MCNC: 1.3 MG/DL (ref 0.7–1.2)
ERYTHROCYTE [DISTWIDTH] IN BLOOD BY AUTOMATED COUNT: 20.2 % (ref 11.8–14.4)
GFR SERPL CREATININE-BSD FRML MDRD: >60 ML/MIN/1.73M2
GLUCOSE SERPL-MCNC: 256 MG/DL (ref 70–99)
HCT VFR BLD AUTO: 26.7 % (ref 40.7–50.3)
HGB BLD-MCNC: 8 G/DL (ref 13–17)
MAGNESIUM SERPL-MCNC: 2 MG/DL (ref 1.6–2.6)
MCH RBC QN AUTO: 30.2 PG (ref 25.2–33.5)
MCHC RBC AUTO-ENTMCNC: 30 G/DL (ref 28.4–34.8)
MCV RBC AUTO: 100.8 FL (ref 82.6–102.9)
NRBC BLD-RTO: 0 PER 100 WBC
PHOSPHATE SERPL-MCNC: 3.5 MG/DL (ref 2.5–4.5)
PLATELET # BLD AUTO: 216 K/UL (ref 138–453)
PMV BLD AUTO: 11.4 FL (ref 8.1–13.5)
POTASSIUM SERPL-SCNC: 4 MMOL/L (ref 3.7–5.3)
PROT SERPL-MCNC: 5.7 G/DL (ref 6.4–8.3)
RBC # BLD AUTO: 2.65 M/UL (ref 4.21–5.77)
SODIUM SERPL-SCNC: 138 MMOL/L (ref 135–144)
WBC OTHER # BLD: 10 K/UL (ref 3.5–11.3)

## 2023-12-14 LAB
ALBUMIN SERPL-MCNC: 2.2 G/DL (ref 3.5–5.2)
ALP SERPL-CCNC: 142 U/L (ref 40–129)
ALT SERPL-CCNC: 10 U/L (ref 5–41)
ANION GAP SERPL CALCULATED.3IONS-SCNC: 9 MMOL/L (ref 9–17)
AST SERPL-CCNC: 18 U/L
BILIRUB SERPL-MCNC: 0.7 MG/DL (ref 0.3–1.2)
BUN SERPL-MCNC: 30 MG/DL (ref 6–20)
BUN/CREAT SERPL: 23 (ref 9–20)
CALCIUM SERPL-MCNC: 8.4 MG/DL (ref 8.6–10.4)
CHLORIDE SERPL-SCNC: 95 MMOL/L (ref 98–107)
CO2 SERPL-SCNC: 33 MMOL/L (ref 20–31)
CREAT SERPL-MCNC: 1.3 MG/DL (ref 0.7–1.2)
ERYTHROCYTE [DISTWIDTH] IN BLOOD BY AUTOMATED COUNT: 20 % (ref 11.8–14.4)
GFR SERPL CREATININE-BSD FRML MDRD: >60 ML/MIN/1.73M2
GLUCOSE SERPL-MCNC: 286 MG/DL (ref 70–99)
HCT VFR BLD AUTO: 26.3 % (ref 40.7–50.3)
HGB BLD-MCNC: 8.1 G/DL (ref 13–17)
MCH RBC QN AUTO: 30.2 PG (ref 25.2–33.5)
MCHC RBC AUTO-ENTMCNC: 30.8 G/DL (ref 28.4–34.8)
MCV RBC AUTO: 98.1 FL (ref 82.6–102.9)
NRBC BLD-RTO: 0 PER 100 WBC
PLATELET # BLD AUTO: 183 K/UL (ref 138–453)
PMV BLD AUTO: 11.4 FL (ref 8.1–13.5)
POTASSIUM SERPL-SCNC: 3.8 MMOL/L (ref 3.7–5.3)
PROT SERPL-MCNC: 5.8 G/DL (ref 6.4–8.3)
RBC # BLD AUTO: 2.68 M/UL (ref 4.21–5.77)
SODIUM SERPL-SCNC: 137 MMOL/L (ref 135–144)
WBC OTHER # BLD: 10.9 K/UL (ref 3.5–11.3)

## 2024-01-01 ENCOUNTER — APPOINTMENT (OUTPATIENT)
Dept: CT IMAGING | Age: 59
DRG: 853 | End: 2024-01-01
Payer: MEDICARE

## 2024-01-01 ENCOUNTER — APPOINTMENT (OUTPATIENT)
Dept: GENERAL RADIOLOGY | Age: 59
DRG: 853 | End: 2024-01-01
Payer: MEDICARE

## 2024-01-01 ENCOUNTER — HOSPITAL ENCOUNTER (INPATIENT)
Age: 59
LOS: 30 days | Discharge: NURSING, REHAB OR LONG-TERM CARE | DRG: 853 | End: 2024-01-31
Attending: EMERGENCY MEDICINE | Admitting: SURGERY
Payer: MEDICARE

## 2024-01-01 DIAGNOSIS — A41.9 SEPTIC SHOCK (HCC): Primary | ICD-10-CM

## 2024-01-01 DIAGNOSIS — T85.528A DISLODGED GASTROSTOMY TUBE: ICD-10-CM

## 2024-01-01 DIAGNOSIS — M79.89 NECROTIZING SOFT TISSUE INFECTION: ICD-10-CM

## 2024-01-01 DIAGNOSIS — R65.21 SEPTIC SHOCK (HCC): Primary | ICD-10-CM

## 2024-01-01 DIAGNOSIS — M72.6 NECROTIZING FASCIITIS (HCC): ICD-10-CM

## 2024-01-01 DIAGNOSIS — I48.0 PAF (PAROXYSMAL ATRIAL FIBRILLATION) (HCC): ICD-10-CM

## 2024-01-01 DIAGNOSIS — I48.91 ATRIAL FIBRILLATION WITH RVR (HCC): ICD-10-CM

## 2024-01-01 PROBLEM — N30.00 ACUTE CYSTITIS WITHOUT HEMATURIA: Status: ACTIVE | Noted: 2024-01-01

## 2024-01-01 PROBLEM — I50.31 ACUTE HEART FAILURE WITH PRESERVED EJECTION FRACTION (HFPEF) (HCC): Status: ACTIVE | Noted: 2024-01-01

## 2024-01-01 PROBLEM — Z93.1 S/P PERCUTANEOUS ENDOSCOPIC GASTROSTOMY (PEG) TUBE PLACEMENT (HCC): Status: ACTIVE | Noted: 2024-01-01

## 2024-01-01 LAB
ALBUMIN SERPL-MCNC: 2.2 G/DL (ref 3.5–5.2)
ALBUMIN/GLOB SERPL: 0.5 {RATIO} (ref 1–2.5)
ALP SERPL-CCNC: 217 U/L (ref 40–129)
ALT SERPL-CCNC: 26 U/L (ref 5–41)
ANION GAP SERPL CALCULATED.3IONS-SCNC: 15 MMOL/L (ref 9–17)
AST SERPL-CCNC: 53 U/L
ATYPICAL LYMPHOCYTE ABSOLUTE COUNT: 0.16 K/UL
ATYPICAL LYMPHOCYTES: 1 %
BACTERIA URNS QL MICRO: ABNORMAL
BASOPHILS # BLD: 0 K/UL (ref 0–0.2)
BASOPHILS NFR BLD: 0 % (ref 0–2)
BILIRUB SERPL-MCNC: 1.9 MG/DL (ref 0.3–1.2)
BILIRUB UR QL STRIP: NEGATIVE
BNP SERPL-MCNC: ABNORMAL PG/ML
BUN SERPL-MCNC: 58 MG/DL (ref 6–20)
CALCIUM SERPL-MCNC: 9.4 MG/DL (ref 8.6–10.4)
CASTS #/AREA URNS LPF: ABNORMAL /LPF (ref 0–8)
CHLORIDE SERPL-SCNC: 92 MMOL/L (ref 98–107)
CLARITY UR: CLEAR
CO2 SERPL-SCNC: 27 MMOL/L (ref 20–31)
COLOR UR: ABNORMAL
CREAT SERPL-MCNC: 1.8 MG/DL (ref 0.7–1.2)
EOSINOPHIL # BLD: 0 K/UL (ref 0–0.4)
EOSINOPHILS RELATIVE PERCENT: 0 % (ref 1–4)
EPI CELLS #/AREA URNS HPF: ABNORMAL /HPF (ref 0–5)
ERYTHROCYTE [DISTWIDTH] IN BLOOD BY AUTOMATED COUNT: 16.9 % (ref 11.8–14.4)
GFR SERPL CREATININE-BSD FRML MDRD: 43 ML/MIN/1.73M2
GLUCOSE BLD-MCNC: 205 MG/DL (ref 75–110)
GLUCOSE SERPL-MCNC: 252 MG/DL (ref 70–99)
GLUCOSE UR STRIP-MCNC: NEGATIVE MG/DL
HCT VFR BLD AUTO: 32.2 % (ref 40.7–50.3)
HGB BLD-MCNC: 9.9 G/DL (ref 13–17)
HGB UR QL STRIP.AUTO: ABNORMAL
IMM GRANULOCYTES # BLD AUTO: 0.47 K/UL (ref 0–0.3)
IMM GRANULOCYTES NFR BLD: 3 %
INR PPP: 1.1
KETONES UR STRIP-MCNC: NEGATIVE MG/DL
LACTIC ACID, SEPSIS WHOLE BLOOD: 4.1 MMOL/L (ref 0.5–1.9)
LACTIC ACID, SEPSIS WHOLE BLOOD: 5 MMOL/L (ref 0.5–1.9)
LEUKOCYTE ESTERASE UR QL STRIP: ABNORMAL
LYMPHOCYTES NFR BLD: 0.63 K/UL (ref 1–4.8)
LYMPHOCYTES RELATIVE PERCENT: 4 % (ref 24–44)
MCH RBC QN AUTO: 29.9 PG (ref 25.2–33.5)
MCHC RBC AUTO-ENTMCNC: 30.7 G/DL (ref 28.4–34.8)
MCV RBC AUTO: 97.3 FL (ref 82.6–102.9)
MODE: ABNORMAL
MONOCYTES NFR BLD: 0.63 K/UL (ref 0.1–0.8)
MONOCYTES NFR BLD: 4 % (ref 1–7)
MORPHOLOGY: ABNORMAL
MORPHOLOGY: ABNORMAL
NEUTROPHILS NFR BLD: 88 % (ref 36–66)
NEUTS SEG NFR BLD: 13.81 K/UL (ref 1.8–7.7)
NITRITE UR QL STRIP: NEGATIVE
NRBC BLD-RTO: 0.2 PER 100 WBC
NUCLEATED RED BLOOD CELLS: 1 PER 100 WBC
O2 DELIVERY DEVICE: ABNORMAL
PARTIAL THROMBOPLASTIN TIME: 24.4 SEC (ref 23–36.5)
PH UR STRIP: >9 [PH] (ref 5–8)
PLATELET # BLD AUTO: ABNORMAL K/UL (ref 138–453)
PLATELET, FLUORESCENCE: 154 K/UL (ref 138–453)
PLATELETS.RETICULATED NFR BLD AUTO: 5.5 % (ref 1.1–10.3)
POC HCO3: 27.6 MMOL/L (ref 21–28)
POC O2 SATURATION: 99.4 % (ref 94–98)
POC PCO2: 40.3 MM HG (ref 35–48)
POC PH: 7.44 (ref 7.35–7.45)
POC PO2: 148.8 MM HG (ref 83–108)
POSITIVE BASE EXCESS, ART: 3.2 MMOL/L (ref 0–3)
POTASSIUM SERPL-SCNC: 4.6 MMOL/L (ref 3.7–5.3)
PROT SERPL-MCNC: 6.9 G/DL (ref 6.4–8.3)
PROT UR STRIP-MCNC: ABNORMAL MG/DL
PROTHROMBIN TIME: 13.5 SEC (ref 11.7–14.9)
RBC # BLD AUTO: 3.31 M/UL (ref 4.21–5.77)
RBC #/AREA URNS HPF: ABNORMAL /HPF (ref 0–4)
SODIUM SERPL-SCNC: 134 MMOL/L (ref 135–144)
SP GR UR STRIP: 1.01 (ref 1–1.03)
TROPONIN I SERPL HS-MCNC: 39 NG/L (ref 0–22)
TROPONIN I SERPL HS-MCNC: 40 NG/L (ref 0–22)
UROBILINOGEN UR STRIP-ACNC: NORMAL EU/DL (ref 0–1)
WBC #/AREA URNS HPF: ABNORMAL /HPF (ref 0–5)
WBC OTHER # BLD: 15.7 K/UL (ref 3.5–11.3)

## 2024-01-01 PROCEDURE — 93005 ELECTROCARDIOGRAM TRACING: CPT | Performed by: EMERGENCY MEDICINE

## 2024-01-01 PROCEDURE — 6370000000 HC RX 637 (ALT 250 FOR IP): Performed by: INTERNAL MEDICINE

## 2024-01-01 PROCEDURE — 87641 MR-STAPH DNA AMP PROBE: CPT

## 2024-01-01 PROCEDURE — 84484 ASSAY OF TROPONIN QUANT: CPT

## 2024-01-01 PROCEDURE — 87154 CUL TYP ID BLD PTHGN 6+ TRGT: CPT

## 2024-01-01 PROCEDURE — 87804 INFLUENZA ASSAY W/OPTIC: CPT

## 2024-01-01 PROCEDURE — 87077 CULTURE AEROBIC IDENTIFY: CPT

## 2024-01-01 PROCEDURE — 83605 ASSAY OF LACTIC ACID: CPT

## 2024-01-01 PROCEDURE — 6370000000 HC RX 637 (ALT 250 FOR IP)

## 2024-01-01 PROCEDURE — 96365 THER/PROPH/DIAG IV INF INIT: CPT

## 2024-01-01 PROCEDURE — 85610 PROTHROMBIN TIME: CPT

## 2024-01-01 PROCEDURE — 96375 TX/PRO/DX INJ NEW DRUG ADDON: CPT

## 2024-01-01 PROCEDURE — 2500000003 HC RX 250 WO HCPCS: Performed by: EMERGENCY MEDICINE

## 2024-01-01 PROCEDURE — 0202U NFCT DS 22 TRGT SARS-COV-2: CPT

## 2024-01-01 PROCEDURE — 85730 THROMBOPLASTIN TIME PARTIAL: CPT

## 2024-01-01 PROCEDURE — 82947 ASSAY GLUCOSE BLOOD QUANT: CPT

## 2024-01-01 PROCEDURE — 2700000000 HC OXYGEN THERAPY PER DAY

## 2024-01-01 PROCEDURE — 2000000000 HC ICU R&B

## 2024-01-01 PROCEDURE — 2580000003 HC RX 258

## 2024-01-01 PROCEDURE — 96367 TX/PROPH/DG ADDL SEQ IV INF: CPT

## 2024-01-01 PROCEDURE — 6360000002 HC RX W HCPCS

## 2024-01-01 PROCEDURE — 80053 COMPREHEN METABOLIC PANEL: CPT

## 2024-01-01 PROCEDURE — 83880 ASSAY OF NATRIURETIC PEPTIDE: CPT

## 2024-01-01 PROCEDURE — 99285 EMERGENCY DEPT VISIT HI MDM: CPT

## 2024-01-01 PROCEDURE — 89220 SPUTUM SPECIMEN COLLECTION: CPT

## 2024-01-01 PROCEDURE — 87086 URINE CULTURE/COLONY COUNT: CPT

## 2024-01-01 PROCEDURE — 71045 X-RAY EXAM CHEST 1 VIEW: CPT

## 2024-01-01 PROCEDURE — 94761 N-INVAS EAR/PLS OXIMETRY MLT: CPT

## 2024-01-01 PROCEDURE — 5A1955Z RESPIRATORY VENTILATION, GREATER THAN 96 CONSECUTIVE HOURS: ICD-10-PCS | Performed by: INTERNAL MEDICINE

## 2024-01-01 PROCEDURE — 87181 SC STD AGAR DILUTION PER AGT: CPT

## 2024-01-01 PROCEDURE — 87040 BLOOD CULTURE FOR BACTERIA: CPT

## 2024-01-01 PROCEDURE — 85025 COMPLETE CBC W/AUTO DIFF WBC: CPT

## 2024-01-01 PROCEDURE — 82803 BLOOD GASES ANY COMBINATION: CPT

## 2024-01-01 PROCEDURE — 87070 CULTURE OTHR SPECIMN AEROBIC: CPT

## 2024-01-01 PROCEDURE — 36600 WITHDRAWAL OF ARTERIAL BLOOD: CPT

## 2024-01-01 PROCEDURE — 2580000003 HC RX 258: Performed by: EMERGENCY MEDICINE

## 2024-01-01 PROCEDURE — 2500000003 HC RX 250 WO HCPCS

## 2024-01-01 PROCEDURE — 94002 VENT MGMT INPAT INIT DAY: CPT

## 2024-01-01 PROCEDURE — 85055 RETICULATED PLATELET ASSAY: CPT

## 2024-01-01 PROCEDURE — 81001 URINALYSIS AUTO W/SCOPE: CPT

## 2024-01-01 PROCEDURE — 02HV33Z INSERTION OF INFUSION DEVICE INTO SUPERIOR VENA CAVA, PERCUTANEOUS APPROACH: ICD-10-PCS | Performed by: INTERNAL MEDICINE

## 2024-01-01 PROCEDURE — 87205 SMEAR GRAM STAIN: CPT

## 2024-01-01 PROCEDURE — 87186 SC STD MICRODIL/AGAR DIL: CPT

## 2024-01-01 RX ORDER — ALPRAZOLAM 0.25 MG/1
0.25 TABLET ORAL NIGHTLY PRN
Status: DISCONTINUED | OUTPATIENT
Start: 2024-01-01 | End: 2024-01-31 | Stop reason: HOSPADM

## 2024-01-01 RX ORDER — ALBUMIN (HUMAN) 12.5 G/50ML
25 SOLUTION INTRAVENOUS ONCE
Status: COMPLETED | OUTPATIENT
Start: 2024-01-01 | End: 2024-01-02

## 2024-01-01 RX ORDER — MAGNESIUM SULFATE IN WATER 40 MG/ML
2000 INJECTION, SOLUTION INTRAVENOUS PRN
Status: DISCONTINUED | OUTPATIENT
Start: 2024-01-01 | End: 2024-01-04

## 2024-01-01 RX ORDER — FENTANYL CITRATE 50 UG/ML
50 INJECTION, SOLUTION INTRAMUSCULAR; INTRAVENOUS ONCE
Status: COMPLETED | OUTPATIENT
Start: 2024-01-01 | End: 2024-01-01

## 2024-01-01 RX ORDER — DEXTROSE MONOHYDRATE 100 MG/ML
INJECTION, SOLUTION INTRAVENOUS CONTINUOUS PRN
Status: DISCONTINUED | OUTPATIENT
Start: 2024-01-01 | End: 2024-01-02

## 2024-01-01 RX ORDER — ACETAMINOPHEN 160 MG/5ML
650 LIQUID ORAL ONCE
Status: COMPLETED | OUTPATIENT
Start: 2024-01-01 | End: 2024-01-01

## 2024-01-01 RX ORDER — ALBUTEROL SULFATE 2.5 MG/3ML
2.5 SOLUTION RESPIRATORY (INHALATION)
Status: DISCONTINUED | OUTPATIENT
Start: 2024-01-01 | End: 2024-01-31 | Stop reason: HOSPADM

## 2024-01-01 RX ORDER — ACETYLCYSTEINE 200 MG/ML
600 SOLUTION ORAL; RESPIRATORY (INHALATION)
Status: DISCONTINUED | OUTPATIENT
Start: 2024-01-01 | End: 2024-01-22

## 2024-01-01 RX ORDER — FENTANYL CITRATE 50 UG/ML
50 INJECTION, SOLUTION INTRAMUSCULAR; INTRAVENOUS EVERY 4 HOURS PRN
Status: DISCONTINUED | OUTPATIENT
Start: 2024-01-01 | End: 2024-01-01

## 2024-01-01 RX ORDER — ONDANSETRON 4 MG/1
4 TABLET, ORALLY DISINTEGRATING ORAL EVERY 8 HOURS PRN
Status: DISCONTINUED | OUTPATIENT
Start: 2024-01-01 | End: 2024-01-31 | Stop reason: HOSPADM

## 2024-01-01 RX ORDER — INSULIN LISPRO 100 [IU]/ML
0-8 INJECTION, SOLUTION INTRAVENOUS; SUBCUTANEOUS EVERY 4 HOURS
Status: DISCONTINUED | OUTPATIENT
Start: 2024-01-01 | End: 2024-01-03

## 2024-01-01 RX ORDER — ALBUTEROL SULFATE 90 UG/1
2 AEROSOL, METERED RESPIRATORY (INHALATION)
Status: DISCONTINUED | OUTPATIENT
Start: 2024-01-01 | End: 2024-01-01

## 2024-01-01 RX ORDER — HEPARIN SODIUM 5000 [USP'U]/ML
5000 INJECTION, SOLUTION INTRAVENOUS; SUBCUTANEOUS EVERY 8 HOURS SCHEDULED
Status: DISCONTINUED | OUTPATIENT
Start: 2024-01-01 | End: 2024-01-09

## 2024-01-01 RX ORDER — SODIUM CHLORIDE 0.9 % (FLUSH) 0.9 %
5-40 SYRINGE (ML) INJECTION EVERY 12 HOURS SCHEDULED
Status: DISCONTINUED | OUTPATIENT
Start: 2024-01-01 | End: 2024-01-08

## 2024-01-01 RX ORDER — DILTIAZEM HYDROCHLORIDE 5 MG/ML
10 INJECTION INTRAVENOUS ONCE
Status: COMPLETED | OUTPATIENT
Start: 2024-01-01 | End: 2024-01-01

## 2024-01-01 RX ORDER — 0.9 % SODIUM CHLORIDE 0.9 %
1000 INTRAVENOUS SOLUTION INTRAVENOUS ONCE
Status: COMPLETED | OUTPATIENT
Start: 2024-01-01 | End: 2024-01-01

## 2024-01-01 RX ORDER — ACETAMINOPHEN 650 MG/1
650 SUPPOSITORY RECTAL EVERY 6 HOURS PRN
Status: DISCONTINUED | OUTPATIENT
Start: 2024-01-01 | End: 2024-01-04

## 2024-01-01 RX ORDER — POLYETHYLENE GLYCOL 3350 17 G/17G
17 POWDER, FOR SOLUTION ORAL DAILY PRN
Status: DISCONTINUED | OUTPATIENT
Start: 2024-01-01 | End: 2024-01-04

## 2024-01-01 RX ORDER — ACETAMINOPHEN 325 MG/1
650 TABLET ORAL EVERY 6 HOURS PRN
Status: DISCONTINUED | OUTPATIENT
Start: 2024-01-01 | End: 2024-01-04

## 2024-01-01 RX ORDER — MORPHINE SULFATE 4 MG/ML
4 INJECTION, SOLUTION INTRAMUSCULAR; INTRAVENOUS ONCE
Status: COMPLETED | OUTPATIENT
Start: 2024-01-01 | End: 2024-01-01

## 2024-01-01 RX ORDER — LIDOCAINE HYDROCHLORIDE 40 MG/ML
4 INJECTION, SOLUTION RETROBULBAR; TOPICAL
Status: DISCONTINUED | OUTPATIENT
Start: 2024-01-01 | End: 2024-01-31 | Stop reason: HOSPADM

## 2024-01-01 RX ORDER — ALPRAZOLAM 0.25 MG/1
0.25 TABLET ORAL ONCE
Status: COMPLETED | OUTPATIENT
Start: 2024-01-01 | End: 2024-01-01

## 2024-01-01 RX ORDER — SODIUM CHLORIDE 0.9 % (FLUSH) 0.9 %
5-40 SYRINGE (ML) INJECTION PRN
Status: DISCONTINUED | OUTPATIENT
Start: 2024-01-01 | End: 2024-01-22

## 2024-01-01 RX ORDER — ENOXAPARIN SODIUM 100 MG/ML
40 INJECTION SUBCUTANEOUS 2 TIMES DAILY
Status: DISCONTINUED | OUTPATIENT
Start: 2024-01-01 | End: 2024-01-01

## 2024-01-01 RX ORDER — 0.9 % SODIUM CHLORIDE 0.9 %
30 INTRAVENOUS SOLUTION INTRAVENOUS ONCE
Status: DISCONTINUED | OUTPATIENT
Start: 2024-01-01 | End: 2024-01-01

## 2024-01-01 RX ORDER — DILTIAZEM HYDROCHLORIDE 5 MG/ML
INJECTION INTRAVENOUS
Status: COMPLETED
Start: 2024-01-01 | End: 2024-01-01

## 2024-01-01 RX ORDER — FENTANYL CITRATE 50 UG/ML
25 INJECTION, SOLUTION INTRAMUSCULAR; INTRAVENOUS
Status: DISCONTINUED | OUTPATIENT
Start: 2024-01-01 | End: 2024-01-04

## 2024-01-01 RX ORDER — POTASSIUM CHLORIDE 7.45 MG/ML
10 INJECTION INTRAVENOUS PRN
Status: DISCONTINUED | OUTPATIENT
Start: 2024-01-01 | End: 2024-01-04

## 2024-01-01 RX ORDER — FUROSEMIDE 10 MG/ML
40 INJECTION INTRAMUSCULAR; INTRAVENOUS ONCE
Status: COMPLETED | OUTPATIENT
Start: 2024-01-01 | End: 2024-01-02

## 2024-01-01 RX ORDER — LEVOTHYROXINE SODIUM 0.1 MG/1
200 TABLET ORAL DAILY
Status: DISCONTINUED | OUTPATIENT
Start: 2024-01-02 | End: 2024-01-31 | Stop reason: HOSPADM

## 2024-01-01 RX ORDER — ONDANSETRON 2 MG/ML
4 INJECTION INTRAMUSCULAR; INTRAVENOUS EVERY 6 HOURS PRN
Status: DISCONTINUED | OUTPATIENT
Start: 2024-01-01 | End: 2024-01-31 | Stop reason: HOSPADM

## 2024-01-01 RX ORDER — OXYCODONE HYDROCHLORIDE 5 MG/1
5 TABLET ORAL ONCE
Status: DISCONTINUED | OUTPATIENT
Start: 2024-01-01 | End: 2024-01-01

## 2024-01-01 RX ORDER — NOREPINEPHRINE BITARTRATE 0.06 MG/ML
1-100 INJECTION, SOLUTION INTRAVENOUS CONTINUOUS
Status: DISCONTINUED | OUTPATIENT
Start: 2024-01-01 | End: 2024-01-12

## 2024-01-01 RX ORDER — AMIODARONE HYDROCHLORIDE 200 MG/1
200 TABLET ORAL DAILY
Status: DISCONTINUED | OUTPATIENT
Start: 2024-01-01 | End: 2024-01-02

## 2024-01-01 RX ORDER — SODIUM CHLORIDE 9 MG/ML
INJECTION, SOLUTION INTRAVENOUS PRN
Status: DISCONTINUED | OUTPATIENT
Start: 2024-01-01 | End: 2024-01-08

## 2024-01-01 RX ORDER — ASPIRIN 81 MG/1
81 TABLET ORAL DAILY
Status: DISCONTINUED | OUTPATIENT
Start: 2024-01-01 | End: 2024-01-02

## 2024-01-01 RX ORDER — POTASSIUM CHLORIDE 29.8 MG/ML
20 INJECTION INTRAVENOUS PRN
Status: DISCONTINUED | OUTPATIENT
Start: 2024-01-01 | End: 2024-01-04

## 2024-01-01 RX ADMIN — FENTANYL CITRATE 50 MCG: 50 INJECTION, SOLUTION INTRAMUSCULAR; INTRAVENOUS at 18:19

## 2024-01-01 RX ADMIN — AZITHROMYCIN MONOHYDRATE 500 MG: 500 INJECTION, POWDER, LYOPHILIZED, FOR SOLUTION INTRAVENOUS at 16:09

## 2024-01-01 RX ADMIN — MEROPENEM 1000 MG: 1 INJECTION, POWDER, FOR SOLUTION INTRAVENOUS at 23:15

## 2024-01-01 RX ADMIN — CEFEPIME 2000 MG: 2 INJECTION, POWDER, FOR SOLUTION INTRAVENOUS at 16:48

## 2024-01-01 RX ADMIN — INSULIN LISPRO 2 UNITS: 100 INJECTION, SOLUTION INTRAVENOUS; SUBCUTANEOUS at 23:06

## 2024-01-01 RX ADMIN — DILTIAZEM HYDROCHLORIDE 10 MG: 5 INJECTION, SOLUTION INTRAVENOUS at 15:05

## 2024-01-01 RX ADMIN — SODIUM CHLORIDE: 9 INJECTION, SOLUTION INTRAVENOUS at 23:13

## 2024-01-01 RX ADMIN — PHENYLEPHRINE HYDROCHLORIDE 100 MCG/MIN: 10 INJECTION INTRAVENOUS at 17:15

## 2024-01-01 RX ADMIN — MORPHINE SULFATE 4 MG: 4 INJECTION INTRAVENOUS at 16:04

## 2024-01-01 RX ADMIN — SODIUM CHLORIDE 1000 ML: 9 INJECTION, SOLUTION INTRAVENOUS at 15:06

## 2024-01-01 RX ADMIN — SODIUM CHLORIDE 2259 ML: 900 INJECTION INTRAVENOUS at 16:49

## 2024-01-01 RX ADMIN — PHENYLEPHRINE HYDROCHLORIDE 225 MCG/MIN: 10 INJECTION INTRAVENOUS at 22:39

## 2024-01-01 RX ADMIN — DILTIAZEM HYDROCHLORIDE 10 MG: 5 INJECTION INTRAVENOUS at 15:03

## 2024-01-01 RX ADMIN — Medication 5 MCG/MIN: at 21:43

## 2024-01-01 RX ADMIN — ACETAMINOPHEN 650 MG: 650 SOLUTION ORAL at 15:24

## 2024-01-01 RX ADMIN — ALPRAZOLAM 0.25 MG: 0.25 TABLET ORAL at 20:15

## 2024-01-01 RX ADMIN — DILTIAZEM HYDROCHLORIDE 10 MG: 5 INJECTION, SOLUTION INTRAVENOUS at 15:03

## 2024-01-01 ASSESSMENT — PULMONARY FUNCTION TESTS
PIF_VALUE: 23
PIF_VALUE: 10

## 2024-01-01 NOTE — ED PROVIDER NOTES
[facility]  FACULTY HANDOFF       Handoff taken on the following patient from prior Attending Physician:  Pt Name: Ruben Dimas  PCP:  Ramy Lazo MD    Attestation  I was available and discussed any additional care issues that arose and coordinated the management plans with the resident(s) caring for the patient during my duty period. Any areas of disagreement with resident's documentation of care or procedures are noted on the chart. I was personally present for the key portions of any/all procedures during my duty period. I have documented in the chart those procedures where I was not present during the key portions.         CHIEF COMPLAINT       Chief Complaint   Patient presents with    Chest Pain    Shortness of Breath         CURRENT MEDICATIONS     Previous Medications  Previous Medications    ACETAMINOPHEN (TYLENOL) 325 MG TABLET    2 tablets by PEG Tube route every 6 hours as needed for Pain    ALBUTEROL (PROVENTIL) (2.5 MG/3ML) 0.083% NEBULIZER SOLUTION    Take 3 mLs by nebulization every 4 hours as needed for Wheezing or Shortness of Breath    AMIODARONE (CORDARONE) 200 MG TABLET    1 tablet by PEG Tube route 2 times daily    ASPIRIN 81 MG EC TABLET    Take 1 tablet by mouth daily    BUMETANIDE (BUMEX) 1 MG TABLET    1 tablet by PEG Tube route 2 times daily    CITALOPRAM (CELEXA) 10 MG TABLET    3 tablets by Orogastric route daily    CLOBETASOL (TEMOVATE) 0.05 % OINTMENT    Apply topically 2 times daily Apply topically 2 times daily.    CLONAZEPAM (KLONOPIN) 1 MG TABLET    1 tablet by Orogastric route in the morning and 1 tablet in the evening. Do all this for 14 days. Max Daily Amount: 2 mg.    DOCUSATE (COLACE) 50 MG/5ML LIQUID    10 mLs by Per G Tube route 2 times daily    FAMOTIDINE (PEPCID) 20 MG TABLET    1 tablet by Per G Tube route 2 times daily    FERROUS SULFATE (FE TABS 325) 325 (65 FE) MG EC TABLET    Take 1 tablet by mouth daily (with breakfast)    HYDROCORTISONE  presents by EMS, recent trach at an ECF, A-fib RVR and concern for sepsis.    Patient was given single dose of diltiazem and converted from A-fib RVR to sinus at a controlled rate.  Initially his blood pressure was elevated, subsequently blood pressure dropped into the 70s over 50s, given his arrhythmias we chose Cipriano-Synephrine as a vasopressor, he was given 2 L of saline given his elevated lactate and concern for sepsis.    Lactate continues to rise, initially 4, then 5.  He is having generalized bodyaches, previous admission shows there was concern for intra-abdominal bleeding versus fluid.    5:52 PM EST  We have consulted critical care, we will get a CT PE as well as abdomen pelvis with contrast, he received antibiotics as well as the full sepsis pathway.    CRITICAL CARE: There was a high probability of clinically significant/life threatening deterioration in this patient's condition which required my urgent intervention.  Total critical care time was 45 minutes.  This excludes any time for separately reportable procedures.       (Please note that portions of this note were completed with a voice recognition program.  Efforts were made to edit the dictations but occasionally words are mis-transcribed.)    Manohar Briceño MD,, MD  Attending Emergency Physician       Manohar Briceño MD  01/01/24 [id number]

## 2024-01-01 NOTE — ED NOTES
The following labs were labeled with appropriate pt sticker and tubed to lab:     [] Blue     [] Lavender   [] on ice  [x] Green/yellow  [x] Green/black [x] on ice  [] Grey  [] on ice  [] Yellow  [] Red  [] Pink  [] Type/ Screen  [] ABG  [] VBG    [] COVID-19 swab    [] Rapid  [] PCR  [] Flu swab  [] Peds Viral Panel     [] Urine Sample  [] Fecal Sample  [] Pelvic Cultures  [] Blood Cultures  [] X 2  [] STREP Cultures  [] Wound Cultures

## 2024-01-01 NOTE — ED PROVIDER NOTES
[facility]  Emergency Department Encounter  Emergency Medicine Resident     Pt Name:Ruben Dimas  MRN: [id number]  Birthdate 1965  Date of evaluation: 1/1/24  PCP:  Ramy Lazo MD  Note Started: 2:57 PM EST      CHIEF COMPLAINT       Chief Complaint   Patient presents with    Chest Pain    Shortness of Breath       HISTORY OF PRESENT ILLNESS  (Location/Symptom, Timing/Onset, Context/Setting, Quality, Duration, Modifying Factors, Severity.)      Ruben Dimas is a 58 y.o. male who presents with chest pain that began abruptly earlier today.  He is trach/vent/PEG dependent and lives at a facility.  He endorses associated diaphoresis with the chest pain.  Denies any nausea or vomiting.  Does have a history of A-fib RVR, on amiodarone at home.  Patient was found by EMS to be desaturating to 70s on his home vent settings.  He received 2 doses of sublingual nitroglycerin en route by EMS, did not receive aspirin due to being trach/PEG dependent.  Initial concern for STEMI on EKG obtained by EMS.  However, on arrival to ED, EKG obtained showing A-fib RVR.  Sent to interventional cardiologist who also agreed that patient was in A-fib RVR with no concern for STEMI.  Patient arrives to ED ill-appearing, however not diaphoretic, alert and mentating appropriately.  He is mildly tachypneic and on increased vent settings from baseline.  Endorsing ongoing chest pain in the center of his chest, nonradiating, 10/10 intensity, no associated nausea or vomiting, shortness of breath.      PAST MEDICAL / SURGICAL / SOCIAL / FAMILY HISTORY      has a past medical history of A-fib (HCC), Anxiety and depression, Back pain, chronic, BPH (benign prostatic hyperplasia), Cellulitis of left lower extremity, Cellulitis of right lower extremity, CHF (congestive heart failure) (HCC), Chronic respiratory failure with hypoxia (HCC), Cirrhosis (HCC), Diabetes mellitus (HCC), Epididymoorchitis, GERD (gastroesophageal reflux disease), H/O

## 2024-01-01 NOTE — ED NOTES
Pt. Arrives to ED via private auto for c/o chest pain that began abruptly earlier today.  He is trach/vent/PEG dependent and lives at a facility.  He endorses associated diaphoresis with the chest pain.  Denies any nausea or vomiting.  Does have a history of A-fib RVR, on amiodarone at home.  Patient was found by EMS to be desaturating to 70s on his home vent settings.  He received 2 doses of sublingual nitroglycerin en route by EMS, did not receive aspirin due to being trach/PEG dependent.  Initial concern for STEMI on EKG obtained by EMS.  However, on arrival to ED, EKG obtained showing A-fib RVR.  Sent to interventional cardiologist who also agreed that patient was in A-fib RVR with no concern for STEMI.  Patient arrives to ED ill-appearing, however not diaphoretic, alert and mentating appropriately.  He is mildly tachypneic and on increased vent settings from baseline.  Endorsing ongoing chest pain in the center of his chest, nonradiating, 10/10 intensity, no associated nausea or vomiting, shortness of breath.

## 2024-01-01 NOTE — ED NOTES
2-3L NC @ home    Pancreatitis     x 5 episodes    Poisoning by insulin and oral hypoglycemic (antidiabetic) drugs, accidental (unintentional), initial encounter 01/12/2021    Primary osteoarthritis of right knee     Retention, urine 01/24/2018    SBO (small bowel obstruction) ([facility]) 05/19/2018    Secondary hypercoagulable state ([facility]) 4/18/2023    Septic shock ([facility])     Sleep apnea     suspected juany, never has had PSG study.  HAS NO MACHINE    Sleep apnea, obstructive     pt noncompliant w/ CPAP @ home.     Thyroid disease     Under care of team     Urologist- Dr. chowdhury    Urinary bladder neurogenic dysfunction     Urinary tract infection associated with indwelling urethral catheter ([facility]) 11/04/2020       Labs:  Labs Reviewed   CULTURE, RESPIRATORY - Abnormal; Notable for the following components:       Result Value    Direct Exam MODERATE GRAM NEGATIVE RODS (*)     All other components within normal limits   CBC WITH AUTO DIFFERENTIAL - Abnormal; Notable for the following components:    WBC 15.7 (*)     RBC 3.31 (*)     Hemoglobin 9.9 (*)     Hematocrit 32.2 (*)     RDW 16.9 (*)     NRBC Automated 0.2 (*)     Immature Granulocytes 3 (*)     Neutrophils % 88 (*)     Lymphocytes % 4 (*)     Eosinophils % 0 (*)     nRBC 1 (*)     Absolute Immature Granulocyte 0.47 (*)     Neutrophils Absolute 13.81 (*)     Lymphocytes Absolute 0.63 (*)     All other components within normal limits   COMPREHENSIVE METABOLIC PANEL - Abnormal; Notable for the following components:    Sodium 134 (*)     Chloride 92 (*)     Glucose 252 (*)     BUN 58 (*)     Creatinine 1.8 (*)     Est, Glom Filt Rate 43 (*)     Albumin 2.2 (*)     Albumin/Globulin Ratio 0.5 (*)     Total Bilirubin 1.9 (*)     Alkaline Phosphatase 217 (*)     AST 53 (*)     All other components within normal limits   LACTATE, SEPSIS - Abnormal; Notable for the following components:    Lactic Acid, Sepsis, Whole Blood 4.1 (*)     All other components within normal limits    LACTATE, SEPSIS - Abnormal; Notable for the following components:    Lactic Acid, Sepsis, Whole Blood 5.0 (*)     All other components within normal limits   TROPONIN - Abnormal; Notable for the following components:    Troponin, High Sensitivity 39 (*)     All other components within normal limits   TROPONIN - Abnormal; Notable for the following components:    Troponin, High Sensitivity 40 (*)     All other components within normal limits   URINALYSIS WITH MICROSCOPIC - Abnormal; Notable for the following components:    Color, UA Dark Yellow (*)     Urine Hgb TRACE (*)     pH, UA >9.0 (*)     Protein, UA 2+ (*)     Leukocyte Esterase, Urine MODERATE (*)     Bacteria, UA FEW (*)     All other components within normal limits   BRAIN NATRIURETIC PEPTIDE - Abnormal; Notable for the following components:    Pro-BNP 13,829 (*)     All other components within normal limits   CULTURE, BLOOD 1   CULTURE, BLOOD 1   COVID-19, RAPID   CULTURE, URINE   RAPID INFLUENZA A/B ANTIGENS   PROTIME-INR   APTT       Electronically signed by Dia Marmolejo RN on 1/1/2024 at 6:59 PM

## 2024-01-01 NOTE — ED NOTES
The following labs were labeled with appropriate pt sticker and tubed to lab:     [x] Blue     [x] Lavender   [] on ice  [x] Green/yellow  [x] Green/black [] on ice  [] Grey  [] on ice  [] Yellow  [] Red  [] Pink  [] Type/ Screen  [] ABG  [] VBG    [x] COVID-19 swab    [x] Rapid  [] PCR  [x] Flu swab  [] Peds Viral Panel     [x] Urine Sample  [] Fecal Sample  [] Pelvic Cultures  [x] Blood Cultures  [x] X 2  [] STREP Cultures  [] Wound Cultures

## 2024-01-01 NOTE — ED PROVIDER NOTES
[facility] ED     Emergency Department     Faculty Attestation    I performed a history and physical examination of the patient and discussed management with the resident. I reviewed the resident’s note and agree with the documented findings and plan of care. Any areas of disagreement are noted on the chart. I was personally present for the key portions of any procedures. I have documented in the chart those procedures where I was not present during the key portions. I have reviewed the emergency nurses triage note. I agree with the chief complaint, past medical history, past surgical history, allergies, medications, social and family history as documented unless otherwise noted below. For Physician Assistant/ Nurse Practitioner cases/documentation I have personally evaluated this patient and have completed at least one if not all key elements of the E/M (history, physical exam, and MDM). Additional findings are as noted.    2:48 PM EST    Patient with history of A-fib RVR brought in by EMS with chest pain and shortness of breath that started earlier today.  Patient comes from an extended care facility.  He had recent admission requiring intubation and then subsequent trach placement.  Patient is alert and oriented.  Transmitted EKGs from EMS were somewhat concerning for ST elevation but was complicated due to right bundle branch block and A-fib RVR.  Initial EKG here is less concerning for STEMI.  Patient was given a 10 mg bolus of Cardizem with some improvement of his heart rate.  Will administer an additional bolus and then start Cardizem drip.  Patient's temp is 37.9.  Will initiate sepsis workup.  Will administer fluids.  Will get chest x-ray and labs and plan to admit patient.    EKG Interpretation    Interpreted by emergency department physician    Rhythm: atrial fibrillation - rapid  Rate: >160  Axis: left  Ectopy: none  Conduction: right bundle branch block (complete) and left anterior

## 2024-01-02 ENCOUNTER — APPOINTMENT (OUTPATIENT)
Dept: GENERAL RADIOLOGY | Age: 59
DRG: 853 | End: 2024-01-02
Payer: MEDICARE

## 2024-01-02 ENCOUNTER — APPOINTMENT (OUTPATIENT)
Age: 59
DRG: 853 | End: 2024-01-02
Payer: MEDICARE

## 2024-01-02 ENCOUNTER — ANESTHESIA EVENT (OUTPATIENT)
Dept: OPERATING ROOM | Age: 59
End: 2024-01-02
Payer: MEDICARE

## 2024-01-02 ENCOUNTER — ANESTHESIA (OUTPATIENT)
Dept: OPERATING ROOM | Age: 59
End: 2024-01-02
Payer: MEDICARE

## 2024-01-02 ENCOUNTER — APPOINTMENT (OUTPATIENT)
Dept: CT IMAGING | Age: 59
DRG: 853 | End: 2024-01-02
Payer: MEDICARE

## 2024-01-02 PROBLEM — E87.20 LACTIC ACIDOSIS: Status: ACTIVE | Noted: 2024-01-02

## 2024-01-02 PROBLEM — D72.825 BANDEMIA: Status: ACTIVE | Noted: 2024-01-02

## 2024-01-02 PROBLEM — M79.89 NECROTIZING SOFT TISSUE INFECTION: Status: ACTIVE | Noted: 2024-01-02

## 2024-01-02 PROBLEM — R79.82 CRP ELEVATED: Status: ACTIVE | Noted: 2024-01-02

## 2024-01-02 LAB
ALBUMIN SERPL-MCNC: 1.7 G/DL (ref 3.5–5.2)
ALBUMIN SERPL-MCNC: 2.9 G/DL (ref 3.5–5.2)
ALBUMIN/GLOB SERPL: 0.3 {RATIO} (ref 1–2.5)
ALBUMIN/GLOB SERPL: 1.1 {RATIO} (ref 1–2.5)
ALP SERPL-CCNC: 125 U/L (ref 40–129)
ALP SERPL-CCNC: 224 U/L (ref 40–129)
ALT SERPL-CCNC: 35 U/L (ref 5–41)
ALT SERPL-CCNC: 38 U/L (ref 5–41)
ANION GAP SERPL CALCULATED.3IONS-SCNC: 17 MMOL/L (ref 9–17)
ANION GAP SERPL CALCULATED.3IONS-SCNC: 19 MMOL/L (ref 9–17)
ANION GAP SERPL CALCULATED.3IONS-SCNC: 23 MMOL/L (ref 9–17)
ARTERIAL PATENCY WRIST A: ABNORMAL
ARTERIAL PATENCY WRIST A: ABNORMAL
AST SERPL-CCNC: 81 U/L
AST SERPL-CCNC: 85 U/L
B PARAP IS1001 DNA NPH QL NAA+NON-PROBE: NOT DETECTED
B PERT DNA SPEC QL NAA+PROBE: NOT DETECTED
BACTERIA URNS QL MICRO: NORMAL
BASOPHILS # BLD: 0 K/UL (ref 0–0.2)
BASOPHILS NFR BLD: 0 % (ref 0–2)
BILIRUB DIRECT SERPL-MCNC: 4.3 MG/DL
BILIRUB INDIRECT SERPL-MCNC: 1 MG/DL (ref 0–1)
BILIRUB SERPL-MCNC: 2.2 MG/DL (ref 0.3–1.2)
BILIRUB SERPL-MCNC: 5.3 MG/DL (ref 0.3–1.2)
BILIRUB UR QL STRIP: NEGATIVE
BNP SERPL-MCNC: ABNORMAL PG/ML
BODY TEMPERATURE: 36.9
BODY TEMPERATURE: 37
BODY TEMPERATURE: 37
BUN SERPL-MCNC: 49 MG/DL (ref 6–20)
BUN SERPL-MCNC: 51 MG/DL (ref 6–20)
BUN SERPL-MCNC: 58 MG/DL (ref 6–20)
C PNEUM DNA NPH QL NAA+NON-PROBE: NOT DETECTED
CA-I BLD-SCNC: 1.01 MMOL/L (ref 1.13–1.33)
CA-I BLD-SCNC: 1.2 MMOL/L (ref 1.13–1.33)
CA-I BLD-SCNC: 1.23 MMOL/L (ref 1.13–1.33)
CA-I BLD-SCNC: 1.42 MMOL/L (ref 1.13–1.33)
CALCIUM SERPL-MCNC: 9 MG/DL (ref 8.6–10.4)
CALCIUM SERPL-MCNC: 9.3 MG/DL (ref 8.6–10.4)
CALCIUM SERPL-MCNC: 9.5 MG/DL (ref 8.6–10.4)
CASTS #/AREA URNS LPF: NORMAL /LPF (ref 0–8)
CHLORIDE SERPL-SCNC: 90 MMOL/L (ref 98–107)
CHLORIDE SERPL-SCNC: 96 MMOL/L (ref 98–107)
CHLORIDE SERPL-SCNC: 98 MMOL/L (ref 98–107)
CHLORIDE, WHOLE BLOOD: 102 MMOL/L (ref 98–110)
CHLORIDE, WHOLE BLOOD: 105 MMOL/L (ref 98–110)
CLARITY UR: ABNORMAL
CO2 SERPL-SCNC: 14 MMOL/L (ref 20–31)
CO2 SERPL-SCNC: 21 MMOL/L (ref 20–31)
CO2 SERPL-SCNC: 21 MMOL/L (ref 20–31)
COHGB MFR BLD: 1.1 % (ref 0–5)
COHGB MFR BLD: 2 % (ref 0–5)
COHGB MFR BLD: 3.7 % (ref 0–5)
COLOR UR: ABNORMAL
CREAT SERPL-MCNC: 1.6 MG/DL (ref 0.7–1.2)
CREAT SERPL-MCNC: 1.7 MG/DL (ref 0.7–1.2)
CREAT SERPL-MCNC: 2.1 MG/DL (ref 0.7–1.2)
CRP SERPL HS-MCNC: 338.4 MG/L (ref 0–5)
ECHO BSA: 2.78 M2
ECHO LV EF PHYSICIAN: 50 %
EKG ATRIAL RATE: 125 BPM
EKG Q-T INTERVAL: 390 MS
EKG QRS DURATION: 170 MS
EKG QTC CALCULATION (BAZETT): 560 MS
EKG R AXIS: -52 DEGREES
EKG T AXIS: 107 DEGREES
EKG VENTRICULAR RATE: 124 BPM
EOSINOPHIL # BLD: 0 K/UL (ref 0–0.4)
EOSINOPHILS RELATIVE PERCENT: 0 % (ref 1–4)
EPI CELLS #/AREA URNS HPF: NORMAL /HPF (ref 0–5)
ERYTHROCYTE [DISTWIDTH] IN BLOOD BY AUTOMATED COUNT: 17 % (ref 11.8–14.4)
ERYTHROCYTE [DISTWIDTH] IN BLOOD BY AUTOMATED COUNT: 17.4 % (ref 11.8–14.4)
ERYTHROCYTE [DISTWIDTH] IN BLOOD BY AUTOMATED COUNT: 18.1 % (ref 11.8–14.4)
EST. AVERAGE GLUCOSE BLD GHB EST-MCNC: 134 MG/DL
FIO2 ON VENT: ABNORMAL %
FIO2: 30
FLUAV AG SPEC QL: NEGATIVE
FLUAV RNA NPH QL NAA+NON-PROBE: NOT DETECTED
FLUBV AG SPEC QL: NEGATIVE
FLUBV RNA NPH QL NAA+NON-PROBE: NOT DETECTED
GFR SERPL CREATININE-BSD FRML MDRD: 36 ML/MIN/1.73M2
GFR SERPL CREATININE-BSD FRML MDRD: 46 ML/MIN/1.73M2
GFR SERPL CREATININE-BSD FRML MDRD: 49 ML/MIN/1.73M2
GLUCOSE BLD-MCNC: 151 MG/DL (ref 75–110)
GLUCOSE BLD-MCNC: 167 MG/DL (ref 75–110)
GLUCOSE BLD-MCNC: 189 MG/DL (ref 75–110)
GLUCOSE BLD-MCNC: 203 MG/DL (ref 75–110)
GLUCOSE BLD-MCNC: 215 MG/DL (ref 75–110)
GLUCOSE BLD-MCNC: 224 MG/DL (ref 74–100)
GLUCOSE SERPL-MCNC: 192 MG/DL (ref 70–99)
GLUCOSE SERPL-MCNC: 204 MG/DL (ref 70–99)
GLUCOSE SERPL-MCNC: 265 MG/DL (ref 70–99)
GLUCOSE UR STRIP-MCNC: NEGATIVE MG/DL
HADV DNA NPH QL NAA+NON-PROBE: NOT DETECTED
HBA1C MFR BLD: 6.3 % (ref 4–6)
HCO3 ARTERIAL: 20.6 MMOL/L (ref 22–27)
HCO3 ARTERIAL: 21.9 MMOL/L (ref 22–27)
HCO3 VENOUS: 23.5 MMOL/L (ref 24–30)
HCOV 229E RNA NPH QL NAA+NON-PROBE: NOT DETECTED
HCOV HKU1 RNA NPH QL NAA+NON-PROBE: NOT DETECTED
HCOV NL63 RNA NPH QL NAA+NON-PROBE: NOT DETECTED
HCOV OC43 RNA NPH QL NAA+NON-PROBE: NOT DETECTED
HCT VFR BLD AUTO: 33.7 % (ref 40.7–50.3)
HCT VFR BLD AUTO: 34.8 % (ref 40.7–50.3)
HCT VFR BLD AUTO: 37.4 % (ref 40.7–50.3)
HCT VFR BLD CALC: 27.7 % (ref 40.7–50.3)
HCT VFR BLD CALC: 32.2 % (ref 40.7–50.3)
HEMOGLOBIN: 10.4 GM/DL (ref 13–17)
HEMOGLOBIN: 8.9 GM/DL (ref 13–17)
HGB BLD-MCNC: 10.3 G/DL (ref 13–17)
HGB BLD-MCNC: 10.8 G/DL (ref 13–17)
HGB BLD-MCNC: 11.5 G/DL (ref 13–17)
HGB UR QL STRIP.AUTO: ABNORMAL
HMPV RNA NPH QL NAA+NON-PROBE: NOT DETECTED
HPIV1 RNA NPH QL NAA+NON-PROBE: NOT DETECTED
HPIV2 RNA NPH QL NAA+NON-PROBE: NOT DETECTED
HPIV3 RNA NPH QL NAA+NON-PROBE: NOT DETECTED
HPIV4 RNA NPH QL NAA+NON-PROBE: NOT DETECTED
IMM GRANULOCYTES # BLD AUTO: 0.82 K/UL (ref 0–0.3)
IMM GRANULOCYTES # BLD AUTO: 0.88 K/UL (ref 0–0.3)
IMM GRANULOCYTES # BLD AUTO: 1.14 K/UL (ref 0–0.3)
IMM GRANULOCYTES NFR BLD: 2 %
IMM GRANULOCYTES NFR BLD: 3 %
IMM GRANULOCYTES NFR BLD: 3 %
KETONES UR STRIP-MCNC: NEGATIVE MG/DL
LACTIC ACID, SEPSIS WHOLE BLOOD: 7.6 MMOL/L (ref 0.5–1.9)
LACTIC ACID, WHOLE BLOOD: 6.2 MMOL/L (ref 0.7–2.1)
LACTIC ACID, WHOLE BLOOD: 7.2 MMOL/L (ref 0.7–2.1)
LACTIC ACID, WHOLE BLOOD: 7.3 MMOL/L (ref 0.7–2.1)
LEUKOCYTE ESTERASE UR QL STRIP: ABNORMAL
LIPASE SERPL-CCNC: 9 U/L (ref 13–60)
LYMPHOCYTES NFR BLD: 0 K/UL (ref 1–4.8)
LYMPHOCYTES NFR BLD: 0.54 K/UL (ref 1–4.8)
LYMPHOCYTES NFR BLD: 2.2 K/UL (ref 1–4.8)
LYMPHOCYTES RELATIVE PERCENT: 0 % (ref 24–44)
LYMPHOCYTES RELATIVE PERCENT: 2 % (ref 24–44)
LYMPHOCYTES RELATIVE PERCENT: 5 % (ref 24–44)
M PNEUMO DNA NPH QL NAA+NON-PROBE: NOT DETECTED
MAGNESIUM SERPL-MCNC: 1.9 MG/DL (ref 1.6–2.6)
MCH RBC QN AUTO: 28.8 PG (ref 25.2–33.5)
MCH RBC QN AUTO: 29.3 PG (ref 25.2–33.5)
MCH RBC QN AUTO: 30.2 PG (ref 25.2–33.5)
MCHC RBC AUTO-ENTMCNC: 27.5 G/DL (ref 28.4–34.8)
MCHC RBC AUTO-ENTMCNC: 32 G/DL (ref 28.4–34.8)
MCHC RBC AUTO-ENTMCNC: 33 G/DL (ref 28.4–34.8)
MCV RBC AUTO: 109.7 FL (ref 82.6–102.9)
MCV RBC AUTO: 88.5 FL (ref 82.6–102.9)
MCV RBC AUTO: 89.9 FL (ref 82.6–102.9)
MONOCYTES NFR BLD: 0.38 K/UL (ref 0.1–0.8)
MONOCYTES NFR BLD: 0.82 K/UL (ref 0.1–0.8)
MONOCYTES NFR BLD: 1 % (ref 1–7)
MONOCYTES NFR BLD: 1.76 K/UL (ref 0.1–0.8)
MONOCYTES NFR BLD: 3 % (ref 1–7)
MONOCYTES NFR BLD: 4 % (ref 1–7)
MORPHOLOGY: ABNORMAL
MRSA, DNA, NASAL: ABNORMAL
NEGATIVE BASE EXCESS, ART: 2.6 MMOL/L (ref 0–2)
NEGATIVE BASE EXCESS, ART: 3 MMOL/L (ref 0–2)
NEGATIVE BASE EXCESS, ART: 6.3 MMOL/L (ref 0–2)
NEGATIVE BASE EXCESS, VEN: 2.3 MMOL/L (ref 0–2)
NEUTROPHILS NFR BLD: 89 % (ref 36–66)
NEUTROPHILS NFR BLD: 92 % (ref 36–66)
NEUTROPHILS NFR BLD: 96 % (ref 36–66)
NEUTS SEG NFR BLD: 25.02 K/UL (ref 1.8–7.7)
NEUTS SEG NFR BLD: 36.58 K/UL (ref 1.8–7.7)
NEUTS SEG NFR BLD: 39.16 K/UL (ref 1.8–7.7)
NITRITE UR QL STRIP: NEGATIVE
NRBC BLD-RTO: 0.1 PER 100 WBC
NRBC BLD-RTO: 0.1 PER 100 WBC
NRBC BLD-RTO: 0.2 PER 100 WBC
O2 DELIVERY DEVICE: ABNORMAL
O2 SAT, ARTERIAL: 99.5 % (ref 94–100)
O2 SAT, ARTERIAL: 99.7 % (ref 94–100)
O2 SAT, VEN: 99.1 % (ref 60–85)
PCO2 ARTERIAL: 41.7 MMHG (ref 32–45)
PCO2 ARTERIAL: 50.1 MMHG (ref 32–45)
PCO2, VEN: 47.7 MM HG (ref 39–55)
PH ARTERIAL: 7.24 (ref 7.35–7.45)
PH ARTERIAL: 7.34 (ref 7.35–7.45)
PH UR STRIP: 6.5 [PH] (ref 5–8)
PH VENOUS: 7.31 (ref 7.32–7.42)
PHOSPHATE SERPL-MCNC: 4.9 MG/DL (ref 2.5–4.5)
PLATELET # BLD AUTO: 126 K/UL (ref 138–453)
PLATELET # BLD AUTO: 161 K/UL (ref 138–453)
PLATELET # BLD AUTO: 171 K/UL (ref 138–453)
PLATELET, FLUORESCENCE: 161 K/UL (ref 138–453)
PLATELET, FLUORESCENCE: 171 K/UL (ref 138–453)
PMV BLD AUTO: 10.2 FL (ref 8.1–13.5)
PMV BLD AUTO: 10.5 FL (ref 8.1–13.5)
PMV BLD AUTO: 10.5 FL (ref 8.1–13.5)
PO2 ARTERIAL: 255 MMHG (ref 75–95)
PO2 ARTERIAL: 316 MMHG (ref 75–95)
PO2, VEN: 157 MM HG (ref 30–50)
POC HCO3: 23.3 MMOL/L (ref 21–28)
POC LACTIC ACID: 5 MMOL/L (ref 0.56–1.39)
POC O2 SATURATION: 96 % (ref 94–98)
POC PCO2: 43.3 MM HG (ref 35–48)
POC PH: 7.34 (ref 7.35–7.45)
POC PO2: 87.3 MM HG (ref 83–108)
POTASSIUM SERPL-SCNC: 3.9 MMOL/L (ref 3.7–5.3)
POTASSIUM SERPL-SCNC: 4.3 MMOL/L (ref 3.7–5.3)
POTASSIUM SERPL-SCNC: 4.5 MMOL/L (ref 3.7–5.3)
POTASSIUM, WHOLE BLOOD: 3.9 MMOL/L (ref 3.6–5)
POTASSIUM, WHOLE BLOOD: 4.3 MMOL/L (ref 3.6–5)
PROT SERPL-MCNC: 5.6 G/DL (ref 6.4–8.3)
PROT SERPL-MCNC: 6.7 G/DL (ref 6.4–8.3)
PROT UR STRIP-MCNC: ABNORMAL MG/DL
RBC # BLD AUTO: 3.41 M/UL (ref 4.21–5.77)
RBC # BLD AUTO: 3.75 M/UL (ref 4.21–5.77)
RBC # BLD AUTO: 3.93 M/UL (ref 4.21–5.77)
RBC #/AREA URNS HPF: NORMAL /HPF (ref 0–4)
RSV RNA NPH QL NAA+NON-PROBE: NOT DETECTED
RV+EV RNA NPH QL NAA+NON-PROBE: NOT DETECTED
SARS-COV-2 RNA NPH QL NAA+NON-PROBE: NOT DETECTED
SODIUM SERPL-SCNC: 127 MMOL/L (ref 135–144)
SODIUM SERPL-SCNC: 134 MMOL/L (ref 135–144)
SODIUM SERPL-SCNC: 138 MMOL/L (ref 135–144)
SODIUM, WHOLE BLOOD: 132 MMOL/L (ref 136–145)
SODIUM, WHOLE BLOOD: 132 MMOL/L (ref 136–145)
SP GR UR STRIP: 1.02 (ref 1–1.03)
SPECIMEN DESCRIPTION: ABNORMAL
SPECIMEN DESCRIPTION: NORMAL
TROPONIN I SERPL HS-MCNC: 35 NG/L (ref 0–22)
TROPONIN I SERPL HS-MCNC: 42 NG/L (ref 0–22)
UROBILINOGEN UR STRIP-ACNC: NORMAL EU/DL (ref 0–1)
WBC #/AREA URNS HPF: NORMAL /HPF (ref 0–5)
WBC OTHER # BLD: 27.2 K/UL (ref 3.5–11.3)
WBC OTHER # BLD: 38.1 K/UL (ref 3.5–11.3)
WBC OTHER # BLD: 44 K/UL (ref 3.5–11.3)

## 2024-01-02 PROCEDURE — 74177 CT ABD & PELVIS W/CONTRAST: CPT

## 2024-01-02 PROCEDURE — 84100 ASSAY OF PHOSPHORUS: CPT

## 2024-01-02 PROCEDURE — 2500000003 HC RX 250 WO HCPCS: Performed by: STUDENT IN AN ORGANIZED HEALTH CARE EDUCATION/TRAINING PROGRAM

## 2024-01-02 PROCEDURE — 2000000000 HC ICU R&B

## 2024-01-02 PROCEDURE — 86927 PLASMA FRESH FROZEN: CPT

## 2024-01-02 PROCEDURE — 99223 1ST HOSP IP/OBS HIGH 75: CPT | Performed by: INTERNAL MEDICINE

## 2024-01-02 PROCEDURE — 2700000000 HC OXYGEN THERAPY PER DAY

## 2024-01-02 PROCEDURE — 80053 COMPREHEN METABOLIC PANEL: CPT

## 2024-01-02 PROCEDURE — 11005 DBRDMT SKIN ABDOMINAL WALL: CPT | Performed by: SURGERY

## 2024-01-02 PROCEDURE — 2580000003 HC RX 258: Performed by: STUDENT IN AN ORGANIZED HEALTH CARE EDUCATION/TRAINING PROGRAM

## 2024-01-02 PROCEDURE — 71260 CT THORAX DX C+: CPT

## 2024-01-02 PROCEDURE — A4216 STERILE WATER/SALINE, 10 ML: HCPCS | Performed by: STUDENT IN AN ORGANIZED HEALTH CARE EDUCATION/TRAINING PROGRAM

## 2024-01-02 PROCEDURE — 88342 IMHCHEM/IMCYTCHM 1ST ANTB: CPT

## 2024-01-02 PROCEDURE — 80048 BASIC METABOLIC PNL TOTAL CA: CPT

## 2024-01-02 PROCEDURE — 84484 ASSAY OF TROPONIN QUANT: CPT

## 2024-01-02 PROCEDURE — 11008 RMV PRSTC MTRL/MESH ABD WALL: CPT | Performed by: SURGERY

## 2024-01-02 PROCEDURE — 0DNW0ZZ RELEASE PERITONEUM, OPEN APPROACH: ICD-10-PCS | Performed by: SURGERY

## 2024-01-02 PROCEDURE — P9041 ALBUMIN (HUMAN),5%, 50ML: HCPCS | Performed by: NURSE ANESTHETIST, CERTIFIED REGISTERED

## 2024-01-02 PROCEDURE — 3700000000 HC ANESTHESIA ATTENDED CARE: Performed by: SURGERY

## 2024-01-02 PROCEDURE — 93005 ELECTROCARDIOGRAM TRACING: CPT

## 2024-01-02 PROCEDURE — 97606 NEG PRS WND THER DME>50 SQCM: CPT | Performed by: SURGERY

## 2024-01-02 PROCEDURE — 93308 TTE F-UP OR LMTD: CPT | Performed by: INTERNAL MEDICINE

## 2024-01-02 PROCEDURE — P9047 ALBUMIN (HUMAN), 25%, 50ML: HCPCS

## 2024-01-02 PROCEDURE — 2500000003 HC RX 250 WO HCPCS: Performed by: SPECIALIST

## 2024-01-02 PROCEDURE — 85018 HEMOGLOBIN: CPT

## 2024-01-02 PROCEDURE — 2500000003 HC RX 250 WO HCPCS: Performed by: NURSE PRACTITIONER

## 2024-01-02 PROCEDURE — 6360000002 HC RX W HCPCS: Performed by: STUDENT IN AN ORGANIZED HEALTH CARE EDUCATION/TRAINING PROGRAM

## 2024-01-02 PROCEDURE — 6360000002 HC RX W HCPCS

## 2024-01-02 PROCEDURE — 82803 BLOOD GASES ANY COMBINATION: CPT

## 2024-01-02 PROCEDURE — 84132 ASSAY OF SERUM POTASSIUM: CPT

## 2024-01-02 PROCEDURE — 2720000010 HC SURG SUPPLY STERILE: Performed by: SURGERY

## 2024-01-02 PROCEDURE — 2500000003 HC RX 250 WO HCPCS: Performed by: EMERGENCY MEDICINE

## 2024-01-02 PROCEDURE — 3600000014 HC SURGERY LEVEL 4 ADDTL 15MIN: Performed by: SURGERY

## 2024-01-02 PROCEDURE — 85014 HEMATOCRIT: CPT

## 2024-01-02 PROCEDURE — 94761 N-INVAS EAR/PLS OXIMETRY MLT: CPT

## 2024-01-02 PROCEDURE — C9113 INJ PANTOPRAZOLE SODIUM, VIA: HCPCS | Performed by: STUDENT IN AN ORGANIZED HEALTH CARE EDUCATION/TRAINING PROGRAM

## 2024-01-02 PROCEDURE — 6370000000 HC RX 637 (ALT 250 FOR IP)

## 2024-01-02 PROCEDURE — 3600000004 HC SURGERY LEVEL 4 BASE: Performed by: SURGERY

## 2024-01-02 PROCEDURE — 6370000000 HC RX 637 (ALT 250 FOR IP): Performed by: STUDENT IN AN ORGANIZED HEALTH CARE EDUCATION/TRAINING PROGRAM

## 2024-01-02 PROCEDURE — 43634 REMOVAL OF STOMACH PARTIAL: CPT | Performed by: SURGERY

## 2024-01-02 PROCEDURE — 85025 COMPLETE CBC W/AUTO DIFF WBC: CPT

## 2024-01-02 PROCEDURE — 82435 ASSAY OF BLOOD CHLORIDE: CPT

## 2024-01-02 PROCEDURE — P9073 PLATELETS PHERESIS PATH REDU: HCPCS

## 2024-01-02 PROCEDURE — 88312 SPECIAL STAINS GROUP 1: CPT

## 2024-01-02 PROCEDURE — 81001 URINALYSIS AUTO W/SCOPE: CPT

## 2024-01-02 PROCEDURE — 2580000003 HC RX 258: Performed by: NURSE ANESTHETIST, CERTIFIED REGISTERED

## 2024-01-02 PROCEDURE — 6360000002 HC RX W HCPCS: Performed by: EMERGENCY MEDICINE

## 2024-01-02 PROCEDURE — 80076 HEPATIC FUNCTION PANEL: CPT

## 2024-01-02 PROCEDURE — 30233N1 TRANSFUSION OF NONAUTOLOGOUS RED BLOOD CELLS INTO PERIPHERAL VEIN, PERCUTANEOUS APPROACH: ICD-10-PCS | Performed by: SURGERY

## 2024-01-02 PROCEDURE — 86140 C-REACTIVE PROTEIN: CPT

## 2024-01-02 PROCEDURE — 6360000004 HC RX CONTRAST MEDICATION

## 2024-01-02 PROCEDURE — 82805 BLOOD GASES W/O2 SATURATION: CPT

## 2024-01-02 PROCEDURE — 99233 SBSQ HOSP IP/OBS HIGH 50: CPT | Performed by: SURGERY

## 2024-01-02 PROCEDURE — 86920 COMPATIBILITY TEST SPIN: CPT

## 2024-01-02 PROCEDURE — 82962 GLUCOSE BLOOD TEST: CPT

## 2024-01-02 PROCEDURE — 36415 COLL VENOUS BLD VENIPUNCTURE: CPT

## 2024-01-02 PROCEDURE — 6360000002 HC RX W HCPCS: Performed by: INTERNAL MEDICINE

## 2024-01-02 PROCEDURE — 2580000003 HC RX 258: Performed by: EMERGENCY MEDICINE

## 2024-01-02 PROCEDURE — 03HY32Z INSERTION OF MONITORING DEVICE INTO UPPER ARTERY, PERCUTANEOUS APPROACH: ICD-10-PCS | Performed by: SURGERY

## 2024-01-02 PROCEDURE — 2500000003 HC RX 250 WO HCPCS: Performed by: NURSE ANESTHETIST, CERTIFIED REGISTERED

## 2024-01-02 PROCEDURE — 82330 ASSAY OF CALCIUM: CPT

## 2024-01-02 PROCEDURE — 99232 SBSQ HOSP IP/OBS MODERATE 35: CPT | Performed by: SURGERY

## 2024-01-02 PROCEDURE — 2580000003 HC RX 258

## 2024-01-02 PROCEDURE — 2709999900 HC NON-CHARGEABLE SUPPLY: Performed by: SURGERY

## 2024-01-02 PROCEDURE — 93010 ELECTROCARDIOGRAM REPORT: CPT | Performed by: INTERNAL MEDICINE

## 2024-01-02 PROCEDURE — 83605 ASSAY OF LACTIC ACID: CPT

## 2024-01-02 PROCEDURE — 37799 UNLISTED PX VASCULAR SURGERY: CPT

## 2024-01-02 PROCEDURE — 83880 ASSAY OF NATRIURETIC PEPTIDE: CPT

## 2024-01-02 PROCEDURE — 86900 BLOOD TYPING SEROLOGIC ABO: CPT

## 2024-01-02 PROCEDURE — P9016 RBC LEUKOCYTES REDUCED: HCPCS

## 2024-01-02 PROCEDURE — 0JB80ZZ EXCISION OF ABDOMEN SUBCUTANEOUS TISSUE AND FASCIA, OPEN APPROACH: ICD-10-PCS | Performed by: SURGERY

## 2024-01-02 PROCEDURE — 30233K1 TRANSFUSION OF NONAUTOLOGOUS FROZEN PLASMA INTO PERIPHERAL VEIN, PERCUTANEOUS APPROACH: ICD-10-PCS | Performed by: SURGERY

## 2024-01-02 PROCEDURE — 86850 RBC ANTIBODY SCREEN: CPT

## 2024-01-02 PROCEDURE — 88305 TISSUE EXAM BY PATHOLOGIST: CPT

## 2024-01-02 PROCEDURE — P9041 ALBUMIN (HUMAN),5%, 50ML: HCPCS | Performed by: EMERGENCY MEDICINE

## 2024-01-02 PROCEDURE — 6360000002 HC RX W HCPCS: Performed by: NURSE ANESTHETIST, CERTIFIED REGISTERED

## 2024-01-02 PROCEDURE — 2580000003 HC RX 258: Performed by: SURGERY

## 2024-01-02 PROCEDURE — 99291 CRITICAL CARE FIRST HOUR: CPT | Performed by: INTERNAL MEDICINE

## 2024-01-02 PROCEDURE — 83735 ASSAY OF MAGNESIUM: CPT

## 2024-01-02 PROCEDURE — 0DB60ZZ EXCISION OF STOMACH, OPEN APPROACH: ICD-10-PCS | Performed by: SURGERY

## 2024-01-02 PROCEDURE — 3700000001 HC ADD 15 MINUTES (ANESTHESIA): Performed by: SURGERY

## 2024-01-02 PROCEDURE — 74018 RADEX ABDOMEN 1 VIEW: CPT

## 2024-01-02 PROCEDURE — 84295 ASSAY OF SERUM SODIUM: CPT

## 2024-01-02 PROCEDURE — 99291 CRITICAL CARE FIRST HOUR: CPT | Performed by: SURGERY

## 2024-01-02 PROCEDURE — 6370000000 HC RX 637 (ALT 250 FOR IP): Performed by: NURSE ANESTHETIST, CERTIFIED REGISTERED

## 2024-01-02 PROCEDURE — 6370000000 HC RX 637 (ALT 250 FOR IP): Performed by: EMERGENCY MEDICINE

## 2024-01-02 PROCEDURE — P9017 PLASMA 1 DONOR FRZ W/IN 8 HR: HCPCS

## 2024-01-02 PROCEDURE — 83036 HEMOGLOBIN GLYCOSYLATED A1C: CPT

## 2024-01-02 PROCEDURE — 86901 BLOOD TYPING SEROLOGIC RH(D): CPT

## 2024-01-02 PROCEDURE — 0WPF0JZ REMOVAL OF SYNTHETIC SUBSTITUTE FROM ABDOMINAL WALL, OPEN APPROACH: ICD-10-PCS | Performed by: SURGERY

## 2024-01-02 PROCEDURE — 88307 TISSUE EXAM BY PATHOLOGIST: CPT

## 2024-01-02 PROCEDURE — 94003 VENT MGMT INPAT SUBQ DAY: CPT

## 2024-01-02 PROCEDURE — 83690 ASSAY OF LIPASE: CPT

## 2024-01-02 PROCEDURE — 82947 ASSAY GLUCOSE BLOOD QUANT: CPT

## 2024-01-02 PROCEDURE — 93308 TTE F-UP OR LMTD: CPT

## 2024-01-02 RX ORDER — DIGOXIN 0.25 MG/ML
250 INJECTION INTRAMUSCULAR; INTRAVENOUS DAILY
Status: DISCONTINUED | OUTPATIENT
Start: 2024-01-02 | End: 2024-01-02

## 2024-01-02 RX ORDER — 0.9 % SODIUM CHLORIDE 0.9 %
1000 INTRAVENOUS SOLUTION INTRAVENOUS ONCE
Status: COMPLETED | OUTPATIENT
Start: 2024-01-02 | End: 2024-01-02

## 2024-01-02 RX ORDER — MAGNESIUM HYDROXIDE 1200 MG/15ML
LIQUID ORAL CONTINUOUS PRN
Status: COMPLETED | OUTPATIENT
Start: 2024-01-02 | End: 2024-01-02

## 2024-01-02 RX ORDER — FENTANYL CITRATE 50 UG/ML
INJECTION, SOLUTION INTRAMUSCULAR; INTRAVENOUS PRN
Status: DISCONTINUED | OUTPATIENT
Start: 2024-01-02 | End: 2024-01-02 | Stop reason: SDUPTHER

## 2024-01-02 RX ORDER — MIDAZOLAM HYDROCHLORIDE 2 MG/2ML
2 INJECTION, SOLUTION INTRAMUSCULAR; INTRAVENOUS ONCE
Status: COMPLETED | OUTPATIENT
Start: 2024-01-02 | End: 2024-01-02

## 2024-01-02 RX ORDER — MIDAZOLAM HYDROCHLORIDE 1 MG/ML
INJECTION INTRAMUSCULAR; INTRAVENOUS
Status: COMPLETED
Start: 2024-01-02 | End: 2024-01-02

## 2024-01-02 RX ORDER — PROPOFOL 10 MG/ML
5-50 INJECTION, EMULSION INTRAVENOUS CONTINUOUS
Status: DISCONTINUED | OUTPATIENT
Start: 2024-01-02 | End: 2024-01-08

## 2024-01-02 RX ORDER — PHENYLEPHRINE HCL IN 0.9% NACL 50MG/250ML
10-300 PLASTIC BAG, INJECTION (ML) INTRAVENOUS CONTINUOUS
Status: DISCONTINUED | OUTPATIENT
Start: 2024-01-02 | End: 2024-01-02

## 2024-01-02 RX ORDER — SODIUM HYPOCHLORITE 1.25 MG/ML
SOLUTION TOPICAL ONCE
Status: DISCONTINUED | OUTPATIENT
Start: 2024-01-02 | End: 2024-01-02

## 2024-01-02 RX ORDER — CLINDAMYCIN PHOSPHATE 900 MG/50ML
900 INJECTION, SOLUTION INTRAVENOUS EVERY 8 HOURS
Status: DISCONTINUED | OUTPATIENT
Start: 2024-01-02 | End: 2024-01-02

## 2024-01-02 RX ORDER — DEXMEDETOMIDINE HYDROCHLORIDE 4 UG/ML
.1-1.5 INJECTION, SOLUTION INTRAVENOUS CONTINUOUS
Status: DISCONTINUED | OUTPATIENT
Start: 2024-01-02 | End: 2024-01-06

## 2024-01-02 RX ORDER — ALBUMIN, HUMAN INJ 5% 5 %
SOLUTION INTRAVENOUS PRN
Status: DISCONTINUED | OUTPATIENT
Start: 2024-01-02 | End: 2024-01-02 | Stop reason: SDUPTHER

## 2024-01-02 RX ORDER — MIDAZOLAM HYDROCHLORIDE 1 MG/ML
INJECTION INTRAMUSCULAR; INTRAVENOUS
Status: DISPENSED
Start: 2024-01-02 | End: 2024-01-02

## 2024-01-02 RX ORDER — MIDAZOLAM HYDROCHLORIDE 2 MG/2ML
5 INJECTION, SOLUTION INTRAMUSCULAR; INTRAVENOUS ONCE
Status: COMPLETED | OUTPATIENT
Start: 2024-01-02 | End: 2024-01-02

## 2024-01-02 RX ORDER — FENTANYL CITRATE 50 UG/ML
100 INJECTION, SOLUTION INTRAMUSCULAR; INTRAVENOUS ONCE
Status: COMPLETED | OUTPATIENT
Start: 2024-01-02 | End: 2024-01-02

## 2024-01-02 RX ORDER — ASPIRIN 81 MG/1
81 TABLET, CHEWABLE ORAL DAILY
Status: DISCONTINUED | OUTPATIENT
Start: 2024-01-03 | End: 2024-01-31 | Stop reason: HOSPADM

## 2024-01-02 RX ORDER — DIGOXIN 0.25 MG/ML
125 INJECTION INTRAMUSCULAR; INTRAVENOUS ONCE
Status: COMPLETED | OUTPATIENT
Start: 2024-01-02 | End: 2024-01-02

## 2024-01-02 RX ORDER — SODIUM CHLORIDE, SODIUM LACTATE, POTASSIUM CHLORIDE, CALCIUM CHLORIDE 600; 310; 30; 20 MG/100ML; MG/100ML; MG/100ML; MG/100ML
INJECTION, SOLUTION INTRAVENOUS CONTINUOUS
Status: DISCONTINUED | OUTPATIENT
Start: 2024-01-02 | End: 2024-01-02

## 2024-01-02 RX ORDER — ROCURONIUM BROMIDE 10 MG/ML
INJECTION, SOLUTION INTRAVENOUS PRN
Status: DISCONTINUED | OUTPATIENT
Start: 2024-01-02 | End: 2024-01-02 | Stop reason: SDUPTHER

## 2024-01-02 RX ORDER — DEXTROSE MONOHYDRATE 100 MG/ML
INJECTION, SOLUTION INTRAVENOUS CONTINUOUS PRN
Status: DISCONTINUED | OUTPATIENT
Start: 2024-01-02 | End: 2024-01-08

## 2024-01-02 RX ORDER — MIDAZOLAM HYDROCHLORIDE 1 MG/ML
INJECTION INTRAMUSCULAR; INTRAVENOUS PRN
Status: DISCONTINUED | OUTPATIENT
Start: 2024-01-02 | End: 2024-01-02 | Stop reason: SDUPTHER

## 2024-01-02 RX ORDER — LINEZOLID 2 MG/ML
600 INJECTION, SOLUTION INTRAVENOUS EVERY 12 HOURS
Status: DISCONTINUED | OUTPATIENT
Start: 2024-01-02 | End: 2024-01-06

## 2024-01-02 RX ORDER — DIGOXIN 0.25 MG/ML
250 INJECTION INTRAMUSCULAR; INTRAVENOUS ONCE
Status: COMPLETED | OUTPATIENT
Start: 2024-01-02 | End: 2024-01-02

## 2024-01-02 RX ORDER — ALBUMIN, HUMAN INJ 5% 5 %
25 SOLUTION INTRAVENOUS ONCE
Status: COMPLETED | OUTPATIENT
Start: 2024-01-02 | End: 2024-01-02

## 2024-01-02 RX ORDER — MIDAZOLAM HYDROCHLORIDE 1 MG/ML
INJECTION INTRAMUSCULAR; INTRAVENOUS
Status: DISCONTINUED
Start: 2024-01-02 | End: 2024-01-02 | Stop reason: WASHOUT

## 2024-01-02 RX ORDER — CALCIUM GLUCONATE 20 MG/ML
2000 INJECTION, SOLUTION INTRAVENOUS ONCE
Status: COMPLETED | OUTPATIENT
Start: 2024-01-02 | End: 2024-01-02

## 2024-01-02 RX ORDER — SODIUM CHLORIDE, SODIUM LACTATE, POTASSIUM CHLORIDE, CALCIUM CHLORIDE 600; 310; 30; 20 MG/100ML; MG/100ML; MG/100ML; MG/100ML
INJECTION, SOLUTION INTRAVENOUS CONTINUOUS PRN
Status: DISCONTINUED | OUTPATIENT
Start: 2024-01-02 | End: 2024-01-02 | Stop reason: SDUPTHER

## 2024-01-02 RX ORDER — CALCIUM CHLORIDE 100 MG/ML
INJECTION INTRAVENOUS; INTRAVENTRICULAR PRN
Status: DISCONTINUED | OUTPATIENT
Start: 2024-01-02 | End: 2024-01-02 | Stop reason: SDUPTHER

## 2024-01-02 RX ORDER — 0.9 % SODIUM CHLORIDE 0.9 %
1000 INTRAVENOUS SOLUTION INTRAVENOUS ONCE
Status: DISCONTINUED | OUTPATIENT
Start: 2024-01-02 | End: 2024-01-02

## 2024-01-02 RX ORDER — VASOPRESSIN 20 [USP'U]/ML
INJECTION, SOLUTION INTRAMUSCULAR; SUBCUTANEOUS PRN
Status: DISCONTINUED | OUTPATIENT
Start: 2024-01-02 | End: 2024-01-02 | Stop reason: SDUPTHER

## 2024-01-02 RX ADMIN — AMIODARONE HYDROCHLORIDE 0.5 MG/MIN: 50 INJECTION, SOLUTION INTRAVENOUS at 11:07

## 2024-01-02 RX ADMIN — MIDAZOLAM HYDROCHLORIDE 5 MG: 1 INJECTION, SOLUTION INTRAMUSCULAR; INTRAVENOUS at 03:30

## 2024-01-02 RX ADMIN — SODIUM BICARBONATE 25 MEQ: 84 INJECTION INTRAVENOUS at 06:41

## 2024-01-02 RX ADMIN — MICONAZOLE NITRATE: 20 CREAM TOPICAL at 01:25

## 2024-01-02 RX ADMIN — SODIUM CHLORIDE, POTASSIUM CHLORIDE, SODIUM LACTATE AND CALCIUM CHLORIDE: 600; 310; 30; 20 INJECTION, SOLUTION INTRAVENOUS at 04:24

## 2024-01-02 RX ADMIN — SODIUM CHLORIDE, POTASSIUM CHLORIDE, SODIUM LACTATE AND CALCIUM CHLORIDE: 600; 310; 30; 20 INJECTION, SOLUTION INTRAVENOUS at 05:34

## 2024-01-02 RX ADMIN — MIDAZOLAM HYDROCHLORIDE 2 MG: 2 INJECTION, SOLUTION INTRAMUSCULAR; INTRAVENOUS at 03:21

## 2024-01-02 RX ADMIN — LINEZOLID 600 MG: 600 INJECTION, SOLUTION INTRAVENOUS at 11:44

## 2024-01-02 RX ADMIN — DIGOXIN 125 MCG: 0.25 INJECTION INTRAMUSCULAR; INTRAVENOUS at 11:41

## 2024-01-02 RX ADMIN — INSULIN LISPRO 4 UNITS: 100 INJECTION, SOLUTION INTRAVENOUS; SUBCUTANEOUS at 18:02

## 2024-01-02 RX ADMIN — AMIODARONE HYDROCHLORIDE 1 MG/MIN: 50 INJECTION, SOLUTION INTRAVENOUS at 02:20

## 2024-01-02 RX ADMIN — Medication 30 MCG/MIN: at 10:26

## 2024-01-02 RX ADMIN — AMIODARONE HYDROCHLORIDE 1 MG/MIN: 50 INJECTION, SOLUTION INTRAVENOUS at 20:30

## 2024-01-02 RX ADMIN — HEPARIN SODIUM 5000 UNITS: 5000 INJECTION INTRAVENOUS; SUBCUTANEOUS at 22:30

## 2024-01-02 RX ADMIN — PROPOFOL 20 MCG/KG/MIN: 10 INJECTION, EMULSION INTRAVENOUS at 08:06

## 2024-01-02 RX ADMIN — MIDAZOLAM HYDROCHLORIDE 2 MG: 1 INJECTION, SOLUTION INTRAMUSCULAR; INTRAVENOUS at 03:21

## 2024-01-02 RX ADMIN — LINEZOLID 600 MG: 600 INJECTION, SOLUTION INTRAVENOUS at 22:38

## 2024-01-02 RX ADMIN — MEROPENEM 1000 MG: 1 INJECTION, POWDER, FOR SOLUTION INTRAVENOUS at 12:48

## 2024-01-02 RX ADMIN — Medication 200 MCG/HR: at 09:55

## 2024-01-02 RX ADMIN — FENTANYL CITRATE 50 MCG: 50 INJECTION, SOLUTION INTRAMUSCULAR; INTRAVENOUS at 05:12

## 2024-01-02 RX ADMIN — MICONAZOLE NITRATE: 20 CREAM TOPICAL at 21:56

## 2024-01-02 RX ADMIN — INSULIN LISPRO 2 UNITS: 100 INJECTION, SOLUTION INTRAVENOUS; SUBCUTANEOUS at 21:55

## 2024-01-02 RX ADMIN — PANTOPRAZOLE SODIUM 40 MG: 40 INJECTION, POWDER, FOR SOLUTION INTRAVENOUS at 22:35

## 2024-01-02 RX ADMIN — CALCIUM GLUCONATE 2000 MG: 20 INJECTION, SOLUTION INTRAVENOUS at 02:44

## 2024-01-02 RX ADMIN — MEROPENEM 1000 MG: 1 INJECTION, POWDER, FOR SOLUTION INTRAVENOUS at 20:44

## 2024-01-02 RX ADMIN — DIGOXIN 250 MCG: 0.25 INJECTION INTRAMUSCULAR; INTRAVENOUS at 13:53

## 2024-01-02 RX ADMIN — MIDAZOLAM 2 MG: 1 INJECTION INTRAMUSCULAR; INTRAVENOUS at 04:26

## 2024-01-02 RX ADMIN — HEPARIN SODIUM 5000 UNITS: 5000 INJECTION INTRAVENOUS; SUBCUTANEOUS at 14:30

## 2024-01-02 RX ADMIN — ALBUMIN (HUMAN) 25 G: 12.5 INJECTION, SOLUTION INTRAVENOUS at 05:03

## 2024-01-02 RX ADMIN — Medication 100 MCG/HR: at 03:15

## 2024-01-02 RX ADMIN — CALCIUM CHLORIDE 1 G: 100 INJECTION INTRAVENOUS; INTRAVENTRICULAR at 06:27

## 2024-01-02 RX ADMIN — SODIUM CHLORIDE: 9 INJECTION, SOLUTION INTRAVENOUS at 12:48

## 2024-01-02 RX ADMIN — MIDAZOLAM HYDROCHLORIDE 2 MG: 2 INJECTION, SOLUTION INTRAMUSCULAR; INTRAVENOUS at 03:13

## 2024-01-02 RX ADMIN — Medication 200 MCG/HR: at 22:27

## 2024-01-02 RX ADMIN — ROCURONIUM BROMIDE 50 MG: 10 INJECTION INTRAVENOUS at 04:28

## 2024-01-02 RX ADMIN — ALBUMIN (HUMAN) 25 G: 12.5 SOLUTION INTRAVENOUS at 16:15

## 2024-01-02 RX ADMIN — MIDAZOLAM HYDROCHLORIDE 5 MG: 2 INJECTION, SOLUTION INTRAMUSCULAR; INTRAVENOUS at 03:30

## 2024-01-02 RX ADMIN — DEXMEDETOMIDINE HYDROCHLORIDE 0.8 MCG/KG/HR: 400 INJECTION, SOLUTION INTRAVENOUS at 22:25

## 2024-01-02 RX ADMIN — VASOPRESSIN 2 UNITS: 20 INJECTION, SOLUTION INTRAVENOUS at 04:58

## 2024-01-02 RX ADMIN — INSULIN HUMAN 4 UNITS: 100 INJECTION, SOLUTION PARENTERAL at 05:21

## 2024-01-02 RX ADMIN — SODIUM CHLORIDE 1000 ML: 9 INJECTION, SOLUTION INTRAVENOUS at 17:05

## 2024-01-02 RX ADMIN — SODIUM CHLORIDE, POTASSIUM CHLORIDE, SODIUM LACTATE AND CALCIUM CHLORIDE: 600; 310; 30; 20 INJECTION, SOLUTION INTRAVENOUS at 04:58

## 2024-01-02 RX ADMIN — ALBUMIN (HUMAN) 25 G: 0.25 INJECTION, SOLUTION INTRAVENOUS at 01:30

## 2024-01-02 RX ADMIN — FENTANYL CITRATE 100 MCG: 50 INJECTION, SOLUTION INTRAMUSCULAR; INTRAVENOUS at 01:32

## 2024-01-02 RX ADMIN — PANTOPRAZOLE SODIUM 40 MG: 40 INJECTION, POWDER, FOR SOLUTION INTRAVENOUS at 09:32

## 2024-01-02 RX ADMIN — FENTANYL CITRATE 100 MCG: 50 INJECTION, SOLUTION INTRAMUSCULAR; INTRAVENOUS at 03:12

## 2024-01-02 RX ADMIN — VASOPRESSIN 0.04 UNITS/MIN: 20 INJECTION, SOLUTION INTRAVENOUS at 20:37

## 2024-01-02 RX ADMIN — DEXTROSE 150 MG: 50 INJECTION, SOLUTION INTRAVENOUS at 02:05

## 2024-01-02 RX ADMIN — IOPAMIDOL 85 ML: 755 INJECTION, SOLUTION INTRAVENOUS at 00:39

## 2024-01-02 RX ADMIN — MICONAZOLE NITRATE: 20 CREAM TOPICAL at 09:34

## 2024-01-02 RX ADMIN — DEXMEDETOMIDINE HYDROCHLORIDE 0.2 MCG/KG/HR: 400 INJECTION, SOLUTION INTRAVENOUS at 14:56

## 2024-01-02 RX ADMIN — VASOPRESSIN 0.03 UNITS/MIN: 20 INJECTION INTRAVENOUS at 10:53

## 2024-01-02 RX ADMIN — VASOPRESSIN 0.04 UNITS/MIN: 20 INJECTION INTRAVENOUS at 05:03

## 2024-01-02 RX ADMIN — ROCURONIUM BROMIDE 30 MG: 10 INJECTION INTRAVENOUS at 05:32

## 2024-01-02 RX ADMIN — MIDAZOLAM HYDROCHLORIDE 2 MG: 1 INJECTION, SOLUTION INTRAMUSCULAR; INTRAVENOUS at 03:13

## 2024-01-02 RX ADMIN — SODIUM CHLORIDE, PRESERVATIVE FREE 10 ML: 5 INJECTION INTRAVENOUS at 09:35

## 2024-01-02 RX ADMIN — INSULIN LISPRO 2 UNITS: 100 INJECTION, SOLUTION INTRAVENOUS; SUBCUTANEOUS at 02:36

## 2024-01-02 RX ADMIN — VASOPRESSIN 0.04 UNITS/MIN: 20 INJECTION, SOLUTION INTRAVENOUS at 14:18

## 2024-01-02 RX ADMIN — AMIODARONE HYDROCHLORIDE 150 MG: 50 INJECTION, SOLUTION INTRAVENOUS at 17:44

## 2024-01-02 RX ADMIN — FENTANYL CITRATE 50 MCG: 50 INJECTION, SOLUTION INTRAMUSCULAR; INTRAVENOUS at 04:26

## 2024-01-02 RX ADMIN — PROPOFOL 10 MCG/KG/MIN: 10 INJECTION, EMULSION INTRAVENOUS at 12:49

## 2024-01-02 RX ADMIN — FUROSEMIDE 40 MG: 10 INJECTION, SOLUTION INTRAMUSCULAR; INTRAVENOUS at 01:19

## 2024-01-02 ASSESSMENT — PULMONARY FUNCTION TESTS
PIF_VALUE: 21
PIF_VALUE: 25
PIF_VALUE: 22
PIF_VALUE: 21
PIF_VALUE: 19

## 2024-01-02 NOTE — H&P
Transition Planning/Quality flow rounds.     Intubated and sedated. FiO2 50% PEEP5. OR early today for dislodged gastrostomy tube, necrotizing soft tissue infection. 45cmm x 50cm open abdominal wound. Plan is back to OR tomorrow and PRN. IV meropenem

## 2024-01-02 NOTE — PLAN OF CARE
Problem: Discharge Planning  Goal: Discharge to home or other facility with appropriate resources  1/2/2024 1040 by Carla Nix RN  Outcome: Progressing  1/2/2024 0738 by Sheyla Mcdaniel RN  Outcome: Progressing     Problem: Pain  Goal: Verbalizes/displays adequate comfort level or baseline comfort level  1/2/2024 1040 by Carla Nix RN  Outcome: Progressing  1/2/2024 0738 by Sheyla Mcdaniel RN  Outcome: Progressing     Problem: Safety - Adult  Goal: Free from fall injury  1/2/2024 1040 by Carla Nix RN  Outcome: Progressing  1/2/2024 0738 by Sheyla Mcdaniel RN  Outcome: Progressing     Problem: Respiratory - Adult  Goal: Achieves optimal ventilation and oxygenation  1/2/2024 1040 by Carla Nix RN  Outcome: Progressing  1/2/2024 0829 by Michelle Porter RCP  Outcome: Progressing  1/2/2024 0738 by Sheyla Mcdaniel RN  Outcome: Progressing  1/2/2024 0112 by Esther Crowe RCP  Flowsheets (Taken 1/2/2024 0112)  Achieves optimal ventilation and oxygenation:   Respiratory therapy support as indicated   Assess the need for suctioning and aspirate as needed   Assess for changes in respiratory status   Assess for changes in mentation and behavior   Oxygen supplementation based on oxygen saturation or arterial blood gases     Problem: Chronic Conditions and Co-morbidities  Goal: Patient's chronic conditions and co-morbidity symptoms are monitored and maintained or improved  Outcome: Progressing     Problem: Skin/Tissue Integrity  Goal: Absence of new skin breakdown  Description: 1.  Monitor for areas of redness and/or skin breakdown  2.  Assess vascular access sites hourly  3.  Every 4-6 hours minimum:  Change oxygen saturation probe site  4.  Every 4-6 hours:  If on nasal continuous positive airway pressure, respiratory therapy assess nares and determine need for appliance change or resting period.  Outcome: Progressing     Problem: ABCDS Injury Assessment  Goal: Absence of physical injury  Outcome:  Progressing

## 2024-01-02 NOTE — CONSULTS
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 T2DM and PEG tube dislodgement and infected   LEONARDA on CKD   1/2/24 S/p partial wedge gastrectomy due to infected PEG tube and dislodgement   1/2/24 S/p debridement of abdomen and indwelling mesh removal,  due to concern for necrotizing fasciitis, wound vac in place  Alcoholic Liver cirrhosis - found intra op  Elevated CRP  Lactic acidosis- initial 4.1 now 6.2  Bandemia- WBC 15 to 38 to 27     Other:    Discussion / summary of stay / plan of care     Recommendations   Wound cultures were not obtained in OR  Monitor temperature closely   Continue with Meropenem zyvox   Watch plts  Consider dapto if plts drop  Defer vancp due to renal issues  Discontinue Clindamycin due to unlikely contribution of GAS toxin   Respiratory culture: gram negative rods  Bcx pending - SCN x 1  is a contamination  MRSA swab and COVID pending       Infection Control Recommendations   [place] Precautions  Contact Isolation       Antimicrobial Stewardship Recommendations   Simplification of therapy  Targeted therapy    History of Present Illness:   Initial history:  Ruben Dimas is a 58 y.o.-year-old male presents from extended-care facility via EMS for complaint of chest pain/abdominal pain.  Patient has a history of type 2 diabetes melitis, hypertension, hyperlipidemia, hypothyroidism, heart failure with preserved ejection fraction, PEG tube, liver cirrhosis, chronic trach secondary to chronic respiratory failure, A-fib, chronic urinary cath, obesity and bilateral lower extremity lymphedema. Recent hospitalization in December 2023.   On 1/1/2023 patient was brought by EMS where he was given 10 mg of Cardizem in the ambulance as well as on arrival to the ED due to A-fib with RVR on EKG.   have independently reviewed/ordered the following labs:    CBC with Differential:   Recent Labs     01/02/24  0122 01/02/24  0450 01/02/24  0629 01/02/24  0841   WBC 38.1*  --   --  27.2*   HGB 10.3*   < > 10.4* 10.8*   HCT 37.4*   < > 32.2* 33.7*     --   --  126*   LYMPHOPCT 0*  --   --  2*   MONOPCT 1  --   --  3    < > = values in this interval not displayed.     BMP:  Recent Labs     01/01/24  1500 01/02/24  0121 01/02/24  0450 01/02/24  0629   * 127* 132* 132*   K 4.6 4.5 3.9 4.3   CL 92* 90*  --   --    CO2 27 14*  --   --    BUN 58* 58*  --   --    CREATININE 1.8* 2.1*  --   --      Hepatic Function Panel:   Recent Labs     01/01/24  1500 01/02/24  0121   PROT 6.9 6.7   LABALBU 2.2* 1.7*   BILITOT 1.9* 2.2*   ALKPHOS 217* 224*   ALT 26 35   AST 53* 85*     No results for input(s): \"RPR\" in the last 72 hours.  No results for input(s): \"HIV\" in the last 72 hours.  No results for input(s): \"BC\" in the last 72 hours.  Lab Results   Component Value Date/Time    CREATININE 2.1 01/02/2024 01:21 AM    GLUCOSE 204 01/02/2024 01:21 AM    GLUCOSE 102 08/30/2011 02:20 PM       Detailed results:        Thank you for allowing us to participate in the care of this patient.Please call with questions.    This note is created with the assistance of a speech recognition program.  While intending to generate adocument that actually reflects the content of the visit, the document can still have some errors including those of syntax and sound a like substitutions which may escape proof reading.  It such instances, actual meaningcan be extrapolated by contextual diversion.      Don Murry MD  Family Medicine Resident, PGY-2   1/2/2023      Lurdes Muller MD  Office: [phone]  Perfect serve / office [phone]

## 2024-01-02 NOTE — PROGRESS NOTES
ER nurse Anat and Boaz were told to bring patient to ER CT scanner multiple times before pt went across the street. Patient went to room without CT scan.

## 2024-01-02 NOTE — CONSULTS
[facility]    Consult / H&P               Today's Date: 1/2/2024  Patient Name: Ruben Dimas  Date of admission: 1/1/2024  2:37 PM  Patient's age: 58 y.o., 1965  Admission Dx: Atrial fibrillation with RVR ([facility]) [I48.91]  Septic shock ([facility]) [A41.9, R65.21]    Reason for Consult:  Cardiac evaluation    Requesting Physician: Rodney Prajapati MD    CHIEF COMPLAINT:  A fib w RVR, Acute HFpEF    History Obtained From:  patient, electronic medical record    HISTORY OF PRESENT ILLNESS:      The patient is a 58 y.o.  male who is admitted to the hospital from [facility]-care facility in by EMS.  He was residing after discharge from outlying facility on 12/14/2023 for acute hypoxic respiratory failure with septic shock and had PEG/trach placed.  Patient was complaining of shortness of breath and chest pain.  EMS found him with desatting to 70s and was diaphoretic.  Initial EKG showed concern for ST elevation with RBBB and A-fib with RVR.  Patient was given Cardizem and brought to ED.  On arrival to ED patient was hypertensive, tachycardic and EKG showed A-fib with RVR.  He got multiple doses of Cardizem.  Patient became hypotensive to systolic 70s.  Patient got 2 L of bolus and started on pressor support.  Patient was found to have leukocytosis, lactic acidosis, elevated creatinine from baseline and BNP.  Patient is recently treated for Pseudomonas pneumonia with Citrobacter koseri and E faecalis UTI.  Patient is currently started on meropenem and vancomycin.  Started on amiodarone for the A-fib with RVR. Not on AC.  Patient currently getting Levophed 5 mics, vasopressin 0.04.    Past Medical History:   has a past medical history of A-fib (HCC), Anxiety and depression, Back pain, chronic, BPH (benign prostatic hyperplasia), Cellulitis of left lower extremity, Cellulitis of right lower extremity, CHF (congestive heart failure) (HCC), Chronic respiratory failure with hypoxia (HCC), Cirrhosis (HCC), Diabetes

## 2024-01-02 NOTE — ANESTHESIA POSTPROCEDURE EVALUATION
Department of Anesthesiology  Postprocedure Note    Patient: Ruben Dimas  MRN: [id number]  YOB: 1965  Date of evaluation: 1/2/2024    Procedure Summary       Date: 01/02/24 Room / Location: [address]    Anesthesia Start: 0424 Anesthesia Stop: 0737    Procedure: LAPAROTOMY EXPLORATORY, DEBRIDEMENT OF SUBQ TISSUE INCLUDING FASCIA 50X44, APPLICATION OF WOUND VAC >50 SQ CM, PARTIAL GASTRECTOMY, EXTENSIVE LYSIS OF ADHESIONS Diagnosis:       Dislodged gastrostomy tube      Necrotizing soft tissue infection    Surgeons: Escobar Ulloa MD Responsible Provider: Armando Schneidre MD    Anesthesia Type: general ASA Status: 4 - Emergent            Anesthesia Type: No value filed.    Des Phase I:      Des Phase II:      Anesthesia Post Evaluation    Patient location during evaluation: ICU  Patient participation: complete - patient cannot participate  Level of consciousness: sedated and ventilated  Airway patency: patent  Nausea & Vomiting: no nausea and no vomiting  Cardiovascular status: blood pressure returned to baseline  Respiratory status: ventilator  Hydration status: euvolemic  Comments: /71   Pulse (!) 117   Temp 97.7 °F (36.5 °C) (Oral)   Resp 16   Wt (!) 156.9 kg (346 lb)   SpO2 98%   BMI 48.28 kg/m²   Pain management: adequate    No notable events documented.

## 2024-01-02 NOTE — OP NOTE
Operative Note      Patient: Ruben Dimas  YOB: 1965  MRN: [id number]    Date of Procedure: 1/2/2024    Pre-Op Diagnosis Codes:     * Dislodged gastrostomy tube [T85.528A]     * Necrotizing soft tissue infection [M79.89]    Post-Op Diagnosis: Same       Procedure  Partial gastrectomy stapled off around previous gastrostomy tube with extensive lysis of adhesions and explant of previous mesh  Debridement with scalpel/electrocautery of abdominal wall tissue to depth through the fascia     [id number]  Negative pressure wound therapy >50sq cm    Surgeon(s):  Escobar Ulloa MD    Assistant:   Resident: Nayla Thompson DO    Anesthesia: General    Estimated Blood Loss (mL): 1500cc    Complications: None    Specimens:   ID Type Source Tests Collected by Time Destination   A : ABDOMINAL SUBCUTANEOUS TISSUE Tissue Abdomen SURGICAL PATHOLOGY Escobar Ulloa MD 1/2/2024 0624    B : OMENTUM Tissue Omentum SURGICAL PATHOLOGY Escobar Ulloa MD 1/2/2024 0625    C : GASTRECTOMY Tissue Stomach SURGICAL PATHOLOGY Escobar Ulloa MD 1/2/2024 0625    D : ABDOMINAL MESH Tissue Abdomen SURGICAL PATHOLOGY Escobar Ulloa MD 1/2/2024 0643        Implants:  * No implants in log *      Drains:   Negative Pressure Wound Therapy Abdomen Medial;Upper (Active)       Urinary Catheter Clemons (Active)   Output (mL) 50 mL 01/02/24 0200       [REMOVED] Gastrostomy/Enterostomy/Jejunostomy Tube Percutaneous Endoscopic Gastrostomy (PEG) 20 fr (Removed)       Findings: Dislodged PEG tube with bumper within the soft tissue of the abdominal wall.  Extensive necrotizing fasciitis requiring extensive debridement with the final abdominal wound measuring 50 x 45 cm.  ABThera wound VAC placed.        Detailed Description of Procedure:   The patient is a 58-year-old male with history of dislodged PEG tube who was requiring greatly increasing pressor support with multiple lab abnormalities and quickly increasing leukocytosis, and a CT  abdomen concerning for necrotizing fasciitis with an abundance of subcutaneous emphysema.  Patient needed to go to the operating room for exploratory laparotomy for repair of dislodged G-tube gastrotomy, and debridement of the abdominal wall for source control.  The procedure, alternatives, risk were discussed with the patient and his family and all questions were answered.  The consent was reviewed, signed, witnessed, placed on the patient's chart.    The patient was taken to the operating room and placed on the operating room table in supine position.  He is already trached and therefore anesthesia induced general anesthesia per their protocol and the patient was sedated.  A timeout was performed verifying the correct patient, procedure, site, positioning prior to beginning.  The area was then prepped and draped in the usual sterile fashion.  A Clemons was already in place.  The patient received triple antibiotic therapy prior to arrival to the OR.  A midline incision was made sharply through the skin and continued through the dermis, subcutaneous tissue although to the fascia with Bovie cautery.  This was prolonged as the patient had multiple varices and had persistent bleeding that was difficult to control.  Additionally, previous surgical sutures and staples had to be removed from the fascia. Extensive adhesions were present and significant time was needed to meticulously take down the dense adhesions in face of ongoing infection and bleeding. The fascia was opened carefully and was extended sharply with heavy scissors to extend the incision through previously placed mesh.  The PEG tube was used externally as a guide to find the stomach and gastrotomy created by the dislodged PEG tube.  Once this was found, the PEG was removed and passed off to the back table.  The stomach was explored with a large defect.  This was closed temporarily with 2 Babcocks and was repaired using 3 loads of a green load RAFFY stapler to

## 2024-01-02 NOTE — CONSULTS
General Surgery  Consult    PATIENT NAME: Ruben Dimas  AGE: 58 y.o.  MEDICAL RECORD NO. [id number]  DATE: 1/2/2024  SURGEON: Artemio  PRIMARY CARE PHYSICIAN: Ramy Lazo MD    Patient evaluated at the request of     Reason for evaluation: Dislodged G-tube    Patient information was obtained from RN, staff, chart.  History/Exam limitations: Tracheostomy in place.      IMPRESSION:     Patient Active Problem List   Diagnosis   • Alcoholic cirrhosis of liver (HCC), sober since 2013   • Peripheral edema   • Bipolar disorder (HCC)   • Type 2 diabetes mellitus with diabetic neuropathy, with long-term current use of insulin (HCC)   • Essential hypertension, currently hypotensive   • Dyslipidemia   • Weakness   • Acute metabolic encephalopathy   • FABI (obstructive sleep apnea)   • Primary osteoarthritis of right knee   • Type 2 diabetes mellitus with diabetic neuropathy (HCC)   • Acquired hypothyroidism   • Urine retention   • Anemia   • Slow transit constipation   • Iron deficiency anemia due to chronic blood loss   • Gastroesophageal reflux disease without esophagitis   • LEONARDA (acute kidney injury) (HCC)   • Secondary esophageal varices without bleeding (HCC)   • Portal hypertension (HCC)   • Obesity, morbid, BMI 50 or higher (HCC)   • Venous stasis dermatitis of both lower extremities   • Cellulitis of perineum   • Chronic indwelling Clemons catheter   • Anxiety and depression   • Back pain, chronic   • BPH (benign prostatic hyperplasia)   • Chronic diastolic CHF (congestive heart failure) (HCC)   • Cirrhosis (HCC)   • Diabetes mellitus (HCC)   • GERD (gastroesophageal reflux disease)   • Hepatitis   • Hiatal hernia   • Hypertension, essential   • IBS (irritable bowel syndrome)   • Morbid obesity with BMI of 45.0-49.9, adult (HCC)   • Neuropathy   • Chronic respiratory failure with hypoxia, on home oxygen therapy (HCC)   • Sleep apnea   • Thyroid disease   • Urinary bladder neurogenic dysfunction   • Acute UTI   •

## 2024-01-02 NOTE — PLAN OF CARE
Problem: Respiratory - Adult  Goal: Achieves optimal ventilation and oxygenation  1/2/2024 0829 by Michelle Porter RCP  Outcome: Progressing     Problem: OXYGENATION/RESPIRATORY FUNCTION  Goal: Patient will maintain patent airway  Outcome: Ongoing  Goal: Patient will achieve/maintain normal respiratory rate/effort  Respiratory rate and effort will be within normal limits for the patient  Outcome: Ongoing    Problem: MECHANICAL VENTILATION  Goal: Patient will maintain patent airway  Outcome: Ongoing  Goal: Oral health is maintained or improved  Outcome: Ongoing  Goal: tube will be managed safely  Outcome: Ongoing  Goal: Ability to express needs and understand communication  Outcome: Ongoing  Goal: Mobility/activity is maintained at optimum level for patient  Outcome: Ongoing    Problem: ASPIRATION PRECAUTIONS  Goal: Patient’s risk of aspiration is minimized  Outcome: Ongoing    Problem: SKIN INTEGRITY  Goal: Skin integrity is maintained or improved  Outcome: Ongoing

## 2024-01-02 NOTE — PLAN OF CARE
Problem: Respiratory - Adult  Goal: Achieves optimal ventilation and oxygenation  Flowsheets (Taken 1/2/2024 0112)  Achieves optimal ventilation and oxygenation:   Respiratory therapy support as indicated   Assess the need for suctioning and aspirate as needed   Assess for changes in respiratory status   Assess for changes in mentation and behavior   Oxygen supplementation based on oxygen saturation or arterial blood gases

## 2024-01-02 NOTE — PROCEDURES
Central Line Placement Procedure Note    Performed by: Jennifer Hernandez MD    Indication: poor peripheral access and centrally administered medications    Consent: The patient provided verbal consent for this procedure however he stated that he wanted to be put to sleep.  Discussed with patient that we can give him fentanyl and Versed to help sedate him.  Patient is on a trach and was on PRVC mode.  Patient was given 2 mg of Versed and 100 mcg push of fentanyl.  Patient remained alert and did not have any changes after the medication was administered therefore he was given an additional 2 mg of Versed and a fentanyl drip was started at 200 mcg/hr. Patient continued to be awake and requested more medication prior to performing the procedure.  Patient was given 5 mg of Versed.  Patient continued to be awake however discussed with patient that we do not want to give him any more Versed due to his creatinine being 2.1.  Patient was given 2 separate boluses of 100mcg of fentanyl throughout the procedure.  Discussed with surgery if they wanted the fentanyl drip discontinued since the procedure was completed, per surgery keep the fentanyl drip going.    Time out performed: Immediately prior to the procedure a \"time out\" was called to verify the correct patient, the correct procedure, equipment, support staff and site/side marked as required.      All elements of maximal sterile barrier techniques were followed.    Procedure: The patient was positioned appropriately and the skin over the left internal jugular vein was prepped with betadine and draped in a sterile fashion. Local anesthesia was obtained by infiltration using 1% Lidocaine without epinephrine.  A large bore needle was used to identify the vein.  A guide wire was then inserted into the vein through the needle. A triple lumen catheter was then inserted into the vessel over the guide wire using the Seldinger technique.  All ports showed good, free flowing blood return

## 2024-01-02 NOTE — ANESTHESIA PRE PROCEDURE
Department of Anesthesiology  Preprocedure Note       Name:  Ruben Dimas   Age:  58 y.o.  :  1965                                          MRN:  [id number]         Date:  2024      Surgeon: Surgeon(s):  Mary Carmen Monte MD    Procedure: Procedure(s):  TAKE BACK EXPLORATORY LAPAROTOMY, POSSIBLE MESH, POSSIBLE WOUND VAC PLACEMENT, POSSIBL G-TUBE, POSSIBLE CLOSURE    Medications prior to admission:   Prior to Admission medications    Medication Sig Start Date End Date Taking? Authorizing Provider   clonazePAM (KLONOPIN) 1 MG tablet 1 tablet by Orogastric route in the morning and 1 tablet in the evening. Do all this for 14 days. Max Daily Amount: 2 mg. 11/10/23 11/24/23  Zakia Sanchez MD   docusate (COLACE) 50 MG/5ML liquid 10 mLs by Per G Tube route 2 times daily 23   Zakia Sanchez MD   levothyroxine (SYNTHROID) 200 MCG tablet 1 tablet by Per G Tube route Daily 11/10/23   Zakia Sanchez MD   magnesium oxide (MAG-OX) 400 (240 Mg) MG tablet 1 tablet by PEG Tube route daily 23   Zakia Sanchez MD   ferrous sulfate (FE TABS 325) 325 (65 Fe) MG EC tablet Take 1 tablet by mouth daily (with breakfast) 23   Zakia Sanchez MD   midodrine (PROAMATINE) 5 MG tablet 3 tablets by Orogastric route every 6 hours 23   Zakia Sanchez MD   traZODone (DESYREL) 50 MG tablet 1.5 tablets by Per G Tube route nightly 23  Zakia Sanchez MD   albuterol (PROVENTIL) (2.5 MG/3ML) 0.083% nebulizer solution Take 3 mLs by nebulization every 4 hours as needed for Wheezing or Shortness of Breath 23   Zakia Sanchez MD   citalopram (CELEXA) 10 MG tablet 3 tablets by Orogastric route daily 11/10/23   Zakia Sanchez MD   famotidine (PEPCID) 20 MG tablet 1 tablet by Per G Tube route 2 times daily 23   Zakia Sanchez MD   bumetanide (BUMEX) 1 MG tablet 1 tablet by PEG Tube route 2 times daily 23   Zakia Sanchez MD   amiodarone (CORDARONE) 200 MG tablet 1 tablet by PEG

## 2024-01-02 NOTE — PROCEDURES
PROCEDURE NOTE - ARTERIAL LINE INSERTION    PATIENT NAME: Ruben Dimas  MEDICAL RECORD #: [id number]  DATE: 1/2/2024  SURGEON: Artemio / Nayla Thompson DO  PREOPERATIVE DIAGNOSIS:  Need for invasive BP monitoring  POSTOPERATIVE DIAGNOSIS:  Same  PROCEDURE PERFORMED: right radial arterial line placement  ANESTHESIA:  noen  ESTIMATED BLOOD LOSS:  Less than 10 ml  COMPLICATIONS:  None immediately appreciated.  OPERATIVE NOTE PREPARED BY: Nayla Thompson DO    DISCUSSION:  This Pt requires  invasive arterial BP monitoring for severe hypotension and need for multiple pressor support. The history and physical examination were reviewed and confirmed. Consent was implied as the patient required the procedure emergently. The patient was then prepared for the procedure.    PROCEDURE:  A timeout was initiated by the bedside nurse and was confirmed by those present.  The patient was placed in a supine position.  The skin overlying the right radial artery was prepped with chlorhexadine and draped in sterile fashion. The skin was infiltrated with local anesthetic. Through the anesthetized region, the introducer needle was inserted into the right radial artery returning pulsatile blood. A guidewire was placed through the center of the needle with no resistance. The catheter was inserted over the guide wire. The guidewire was then removed. The line was connected to the transducer. The catheter then secured using silk suture and a temporary sterile dressing was applied.  No immediate complication was evident.  All sponge, instrument and needle counts were correct at the completion of the procedure.     Nayla Thompson DO  1/2/2024  4:48 AM

## 2024-01-02 NOTE — DISCHARGE INSTRUCTIONS
Discharge Instructions for General Surgery    No lifting above 10Ibs for next 2 weeks.  No soaking in bathtubs or submerging yourself in water for 4 weeks  Resume activity as tolerated, No operating heavy equipment while using narcotics Wash incision gently with soap and water, pat dry.  If steri-strips or surgical glue in place wash gently and leave in place until the glue or strips fall off. (Do not pull/tug)    F/u in trauma/acute care surgery clinic in 1-2 weeks Call your doctor for the following:  Chills  Temperature greater than 101  Pain that is not tolerable despite taking pain medicine as ordered There is increased swelling, redness or warmth at surgical site There is increased drainage or bleeding from surgical site    Recommended diet: {diet:79329}  Depending on your injuries, your doctor may want you to follow a specific diet. Some pain medicine can cause constipation . To avoid this problem:  Drink plenty of fluids.  Eat foods high in fiber , such as:  Whole grain cereals and bread  Fruits and vegtables   Legumes (eg, beans, lentils)      If you are still having problems, talk to your doctor about using laxatives or stool softeners.    General questions or concerns call [phone] for the General Surgery Clinic.  If needed, the clinic fax number is [phone]

## 2024-01-02 NOTE — PROGRESS NOTES
ICU PROGRESS NOTE        PATIENT NAME: Ruben Dimas  MEDICAL RECORD NO. [id number]  DATE: 1/2/2024    HD: # 1    ASSESSMENT    Patient Active Problem List   Diagnosis    Alcoholic cirrhosis of liver (HCC), sober since 2013    Peripheral edema    Bipolar disorder (HCC)    Type 2 diabetes mellitus with diabetic neuropathy, with long-term current use of insulin (HCC)    Essential hypertension, currently hypotensive    Dyslipidemia    Weakness    Acute metabolic encephalopathy    FABI (obstructive sleep apnea)    Primary osteoarthritis of right knee    Type 2 diabetes mellitus with diabetic neuropathy (HCC)    Acquired hypothyroidism    Urine retention    Anemia    Slow transit constipation    Iron deficiency anemia due to chronic blood loss    Gastroesophageal reflux disease without esophagitis    LEONARDA (acute kidney injury) (HCC)    Secondary esophageal varices without bleeding (HCC)    Portal hypertension (HCC)    Obesity, morbid, BMI 50 or higher (HCC)    Venous stasis dermatitis of both lower extremities    Cellulitis of perineum    Chronic indwelling Clemons catheter    Anxiety and depression    Back pain, chronic    BPH (benign prostatic hyperplasia)    Chronic diastolic CHF (congestive heart failure) (HCC)    Cirrhosis (HCC)    Diabetes mellitus (HCC)    GERD (gastroesophageal reflux disease)    Hepatitis    Hiatal hernia    Hypertension, essential    IBS (irritable bowel syndrome)    Morbid obesity with BMI of 45.0-49.9, adult (HCC)    Neuropathy    Chronic respiratory failure with hypoxia, on home oxygen therapy (HCC)    Sleep apnea    Thyroid disease    Urinary bladder neurogenic dysfunction    Acute UTI    Musculoskeletal immobility    Pyocystis    Myoclonic jerking    Thrombocytopenia (HCC)    Hypotension    Sepsis with acute hypercapnic respiratory failure and septic shock (HCC)    Right lower lobe pneumonia    Acute kidney injury superimposed on CKD (HCC)    Somnolence    Acute respiratory acidosis (HCC)     132* 132* 138   K 4.6 4.5 3.9 4.3 3.9   CL 92* 90*  --   --  98   CO2 27 14*  --   --  21   BUN 58* 58*  --   --  51*   CREATININE 1.8* 2.1*  --   --  1.7*   GLUCOSE 252* 204*  --   --  192*         RADIOLOGY:      Domenica Horton,   1/2/2024, 3:49 PM

## 2024-01-02 NOTE — H&P
Critical Care - History and Physical Examination    Patient's name:  Ruben Dimas  Medical Record Number: [id number]  Patient's account/billing number: [id number]  Patient's YOB: 1965  Age: 58 y.o.  Date of Admission: 1/1/2024  2:37 PM  Date of History and Physical Examination: 1/1/2024      Primary Care Physician: Ramy Lazo MD  Attending Physician: Dr Prajapati    Code Status: Full Code    Chief complaint:   Chief Complaint   Patient presents with    Chest Pain    Shortness of Breath         HISTORY OF PRESENT ILLNESS:      History was obtained from chart review and the patient.      Ruben Dimas is a 58 y.o. with PMH of chronic respiratory failure s/p trach, A-fib (not on AC), HFpEF, T2DM, HTN, HLD, hypothyroidism, severe dysphagia s/p PEG, alcoholic liver cirrhosis, Chronic urinary catheter,chronic LE lymphedema and morbid obesity.      Patient is coming from [facility] by EMS where he was residing after discharge from outlying facility on 12/14/2023 where he was admitted for acute hypoxic respiratory failure with septic shock and had trach and PEG placed.  EMS was called and due to patient's complaints of chest pain and shortness of breath.  Per EMS patient was desatting down to 70s on home vent and diaphoretic.  Initial EKG done by EMS was concerning for ST elevation with RBBB and A-fib with RVR.  He was given Cardizem 10 mg and brought to ER.    In ER on initial evaluation patient was hypertensive with blood pressure in 160s and tachycardic.  Repeat EKG showed persistent A-fib with RVR and he was given another 10 mg Cardizem which converted him to rate controlled but soon after that patient became hypotensive with blood pressure went down to 70s/50s.  He received 2 L normal saline and also started on Cipriano-Synephrine for pressor support due to ongoing concern of arrhythmia.  He had a complete sepsis workup done.  Initial labs showed WBC 15.7, lactic acid 4.1, glucose 252,

## 2024-01-02 NOTE — ANESTHESIA PRE PROCEDURE
Incisional hernia with obstruction but no gangrene 05/20/2018    Klebsiella sepsis ([facility]) 10/14/2021    Metabolic encephalopathy     Morbid obesity ([facility])     Neuropathy     feet,toes    On home oxygen therapy     DME for home oxgygen at 2.5 lpm at HS and as needed    On home oxygen therapy     pt uses 2-3L NC @ home    Pancreatitis     x 5 episodes    Poisoning by insulin and oral hypoglycemic (antidiabetic) drugs, accidental (unintentional), initial encounter 01/12/2021    Primary osteoarthritis of right knee     Retention, urine 01/24/2018    SBO (small bowel obstruction) ([facility]) 05/19/2018    Secondary hypercoagulable state ([facility]) 4/18/2023    Septic shock ([facility])     Sleep apnea     suspected juany, never has had PSG study.  HAS NO MACHINE    Sleep apnea, obstructive     pt noncompliant w/ CPAP @ home.     Thyroid disease     Under care of team     Urologist- Dr. guallpa    Urinary bladder neurogenic dysfunction     Urinary tract infection associated with indwelling urethral catheter ([facility]) 11/04/2020       Past Surgical History:        Procedure Laterality Date    ABDOMEN SURGERY      hernia repair x4 (ventral)    COLONOSCOPY      2012    COLONOSCOPY N/A 04/19/2023    COLONOSCOPY DIAGNOSTIC performed by Dorian Rendon MD at [facility] OR    COLONOSCOPY  04/20/2023    COLONOSCOPY N/A 4/20/2023    COLONOSCOPY DIAGNOSTIC performed by Dorian Rendon MD at [facility] OR    CYSTOSCOPY  01/24/2018    CYSTOSCOPY  02/20/2019    CYSTOSCOPY N/A 02/20/2019    CYSTOSCOPY DILATION performed by Fazal Guallpa MD at [facility] OR    CYSTOSCOPY N/A 08/23/2019    CYSTOSCOPY/ DILATION NICOLE CATHETER EXCHANGE performed by Fazal Guallpa MD at [facility] OR    CYSTOSCOPY N/A 06/08/2022    CYSTOSCOPY, REMOVAL OF SMALL BLADDER STONE AND BLADDER IRRIGATION performed by Fazal Guallpa MD at [facility] OR    ENDOSCOPY, COLON, DIAGNOSTIC      HERNIA REPAIR      INCISIONAL HERNIA REPAIR  05/20/2018    repair and reduction of recurrent incarcerated incisional hernia with

## 2024-01-02 NOTE — PROGRESS NOTES
[facility]   Pharmacy Pharmacokinetic Monitoring Service - Vancomycin     Ruben Dimas is a 58 y.o. male starting on vancomycin therapy for sepsis /  abdominal infection. Pharmacy consulted by Jennifer Hernandez  for monitoring and adjustment.    Target Concentration: Goal AUC/SHAHNAZ 400-600 mg*hr/L    Additional Antimicrobials: meropenem     Pertinent Laboratory Values:   Wt Readings from Last 1 Encounters:   01/01/24 (!) 156.9 kg (346 lb)     Temp Readings from Last 1 Encounters:   01/02/24 99 °F (37.2 °C) (Oral)     Estimated Creatinine Clearance: 58 mL/min (A) (based on SCr of 2.1 mg/dL (H)).  Recent Labs     01/01/24  1500 01/02/24  0121 01/02/24  0122   CREATININE 1.8* 2.1*  --    BUN 58* 58*  --    WBC 15.7*  --  38.1*     Procalcitonin:     Pertinent Cultures:  Culture Date Source Results   pending     MRSA Nasal Swab: N/A. Non-respiratory infection.    Plan:  Dosing recommendations based on Bayesian software  Start vancomycin 1750 mg for one dose followed by 1250 mg every 24 hours.  Anticipated AUC of 553 and trough concentration of 15.3 at steady state  Renal labs as indicated     Pharmacy will continue to monitor patient and adjust therapy as indicated    Thank you for the consult,  Rob Calles RPH  1/2/2024 3:27 AM

## 2024-01-02 NOTE — PLAN OF CARE
Problem: Discharge Planning  Goal: Discharge to home or other facility with appropriate resources  Outcome: Progressing     Problem: Pain  Goal: Verbalizes/displays adequate comfort level or baseline comfort level  Outcome: Progressing     Problem: Safety - Adult  Goal: Free from fall injury  Outcome: Progressing     Problem: Respiratory - Adult  Goal: Achieves optimal ventilation and oxygenation  1/2/2024 0738 by Sheyla Mcdaniel RN  Outcome: Progressing  1/2/2024 0112 by Esther Crowe RCP  Flowsheets (Taken 1/2/2024 0112)  Achieves optimal ventilation and oxygenation:   Respiratory therapy support as indicated   Assess the need for suctioning and aspirate as needed   Assess for changes in respiratory status   Assess for changes in mentation and behavior   Oxygen supplementation based on oxygen saturation or arterial blood gases

## 2024-01-03 ENCOUNTER — ANESTHESIA (OUTPATIENT)
Dept: OPERATING ROOM | Age: 59
End: 2024-01-03
Payer: MEDICARE

## 2024-01-03 PROBLEM — T85.528A DISLODGED GASTROSTOMY TUBE: Status: ACTIVE | Noted: 2024-01-03

## 2024-01-03 PROBLEM — Z71.89 GOALS OF CARE, COUNSELING/DISCUSSION: Status: ACTIVE | Noted: 2024-01-03

## 2024-01-03 PROBLEM — I48.91 ATRIAL FIBRILLATION WITH RVR (HCC): Status: ACTIVE | Noted: 2024-01-03

## 2024-01-03 LAB
ALBUMIN SERPL-MCNC: 2.4 G/DL (ref 3.5–5.2)
ALBUMIN/GLOB SERPL: 0.9 {RATIO} (ref 1–2.5)
ALLEN TEST: ABNORMAL
ALLEN TEST: ABNORMAL
ALLEN TEST: POSITIVE
ALP SERPL-CCNC: 122 U/L (ref 40–129)
ALT SERPL-CCNC: 35 U/L (ref 5–41)
ANION GAP SERPL CALCULATED.3IONS-SCNC: 13 MMOL/L (ref 9–17)
ANION GAP SERPL CALCULATED.3IONS-SCNC: 14 MMOL/L (ref 9–17)
ARTERIAL PATENCY WRIST A: ABNORMAL
AST SERPL-CCNC: 61 U/L
BASOPHILS # BLD: 0 K/UL (ref 0–0.2)
BASOPHILS # BLD: 0 K/UL (ref 0–0.2)
BASOPHILS NFR BLD: 0 % (ref 0–2)
BASOPHILS NFR BLD: 0 % (ref 0–2)
BILIRUB SERPL-MCNC: 4.2 MG/DL (ref 0.3–1.2)
BLOOD BANK BLOOD PRODUCT EXPIRATION DATE: NORMAL
BLOOD BANK DISPENSE STATUS: NORMAL
BLOOD BANK ISBT PRODUCT BLOOD TYPE: 6200
BLOOD BANK PRODUCT CODE: NORMAL
BLOOD BANK UNIT TYPE AND RH: NORMAL
BODY TEMPERATURE: 37
BPU ID: NORMAL
BUN SERPL-MCNC: 44 MG/DL (ref 6–20)
BUN SERPL-MCNC: 47 MG/DL (ref 6–20)
CA-I BLD-SCNC: 1.15 MMOL/L (ref 1.13–1.33)
CA-I BLD-SCNC: 1.18 MMOL/L (ref 1.13–1.33)
CALCIUM SERPL-MCNC: 8.6 MG/DL (ref 8.6–10.4)
CALCIUM SERPL-MCNC: 8.8 MG/DL (ref 8.6–10.4)
CHLORIDE SERPL-SCNC: 100 MMOL/L (ref 98–107)
CHLORIDE SERPL-SCNC: 99 MMOL/L (ref 98–107)
CHLORIDE, WHOLE BLOOD: 104 MMOL/L (ref 98–110)
CO2 SERPL-SCNC: 22 MMOL/L (ref 20–31)
CO2 SERPL-SCNC: 23 MMOL/L (ref 20–31)
COHGB MFR BLD: 2.2 % (ref 0–5)
COMPONENT: NORMAL
CREAT SERPL-MCNC: 1.4 MG/DL (ref 0.7–1.2)
CREAT SERPL-MCNC: 1.5 MG/DL (ref 0.7–1.2)
EKG ATRIAL RATE: 89 BPM
EKG P AXIS: 38 DEGREES
EKG P-R INTERVAL: 164 MS
EKG Q-T INTERVAL: 492 MS
EKG QRS DURATION: 156 MS
EKG QTC CALCULATION (BAZETT): 598 MS
EKG R AXIS: -47 DEGREES
EKG T AXIS: 66 DEGREES
EKG VENTRICULAR RATE: 89 BPM
EOSINOPHIL # BLD: 0 K/UL (ref 0–0.4)
EOSINOPHIL # BLD: 0 K/UL (ref 0–0.4)
EOSINOPHILS RELATIVE PERCENT: 0 % (ref 1–4)
EOSINOPHILS RELATIVE PERCENT: 0 % (ref 1–4)
ERYTHROCYTE [DISTWIDTH] IN BLOOD BY AUTOMATED COUNT: 18.3 % (ref 11.8–14.4)
ERYTHROCYTE [DISTWIDTH] IN BLOOD BY AUTOMATED COUNT: 18.7 % (ref 11.8–14.4)
FIO2 ON VENT: 60 %
FIO2: 30
FIO2: 40
GFR SERPL CREATININE-BSD FRML MDRD: 53 ML/MIN/1.73M2
GFR SERPL CREATININE-BSD FRML MDRD: 58 ML/MIN/1.73M2
GLUCOSE BLD-MCNC: 103 MG/DL (ref 75–110)
GLUCOSE BLD-MCNC: 135 MG/DL (ref 75–110)
GLUCOSE BLD-MCNC: 137 MG/DL (ref 75–110)
GLUCOSE BLD-MCNC: 137 MG/DL (ref 75–110)
GLUCOSE BLD-MCNC: 139 MG/DL (ref 75–110)
GLUCOSE BLD-MCNC: 148 MG/DL (ref 75–110)
GLUCOSE BLD-MCNC: 168 MG/DL (ref 75–110)
GLUCOSE BLD-MCNC: 206 MG/DL (ref 75–110)
GLUCOSE BLD-MCNC: 211 MG/DL (ref 75–110)
GLUCOSE BLD-MCNC: 212 MG/DL (ref 75–110)
GLUCOSE BLD-MCNC: 216 MG/DL (ref 74–100)
GLUCOSE BLD-MCNC: 222 MG/DL (ref 75–110)
GLUCOSE BLD-MCNC: 232 MG/DL (ref 75–110)
GLUCOSE BLD-MCNC: 236 MG/DL (ref 74–100)
GLUCOSE BLD-MCNC: 240 MG/DL (ref 75–110)
GLUCOSE BLD-MCNC: 258 MG/DL (ref 75–110)
GLUCOSE BLD-MCNC: 96 MG/DL (ref 75–110)
GLUCOSE BLD-MCNC: 98 MG/DL (ref 74–100)
GLUCOSE SERPL-MCNC: 189 MG/DL (ref 70–99)
GLUCOSE SERPL-MCNC: 266 MG/DL (ref 70–99)
HCO3 ARTERIAL: 24.6 MMOL/L (ref 22–27)
HCT VFR BLD AUTO: 29.2 % (ref 40.7–50.3)
HCT VFR BLD AUTO: 33.6 % (ref 40.7–50.3)
HCT VFR BLD CALC: 30.7 % (ref 40.7–50.3)
HEMOGLOBIN: 9.9 GM/DL (ref 13–17)
HGB BLD-MCNC: 10.6 G/DL (ref 13–17)
HGB BLD-MCNC: 9.8 G/DL (ref 13–17)
IMM GRANULOCYTES # BLD AUTO: 1.52 K/UL (ref 0–0.3)
IMM GRANULOCYTES # BLD AUTO: 2.72 K/UL (ref 0–0.3)
IMM GRANULOCYTES NFR BLD: 6 %
IMM GRANULOCYTES NFR BLD: 6 %
LACTIC ACID, WHOLE BLOOD: 3.1 MMOL/L (ref 0.7–2.1)
LYMPHOCYTES NFR BLD: 1.27 K/UL (ref 1–4.8)
LYMPHOCYTES NFR BLD: 3.18 K/UL (ref 1–4.8)
LYMPHOCYTES RELATIVE PERCENT: 5 % (ref 24–44)
LYMPHOCYTES RELATIVE PERCENT: 7 % (ref 24–44)
MAGNESIUM SERPL-MCNC: 1.9 MG/DL (ref 1.6–2.6)
MCH RBC QN AUTO: 29 PG (ref 25.2–33.5)
MCH RBC QN AUTO: 29.3 PG (ref 25.2–33.5)
MCHC RBC AUTO-ENTMCNC: 31.5 G/DL (ref 28.4–34.8)
MCHC RBC AUTO-ENTMCNC: 33.6 G/DL (ref 28.4–34.8)
MCV RBC AUTO: 87.2 FL (ref 82.6–102.9)
MCV RBC AUTO: 92.1 FL (ref 82.6–102.9)
MICROORGANISM SPEC CULT: ABNORMAL
MODE: ABNORMAL
MONOCYTES NFR BLD: 1.01 K/UL (ref 0.1–0.8)
MONOCYTES NFR BLD: 1.82 K/UL (ref 0.1–0.8)
MONOCYTES NFR BLD: 4 % (ref 1–7)
MONOCYTES NFR BLD: 4 % (ref 1–7)
MORPHOLOGY: ABNORMAL
MORPHOLOGY: ABNORMAL
NEGATIVE BASE EXCESS, ART: 0.2 MMOL/L (ref 0–2)
NEGATIVE BASE EXCESS, ART: 3.7 MMOL/L (ref 0–2)
NEUTROPHILS NFR BLD: 83 % (ref 36–66)
NEUTROPHILS NFR BLD: 85 % (ref 36–66)
NEUTS SEG NFR BLD: 21.5 K/UL (ref 1.8–7.7)
NEUTS SEG NFR BLD: 37.68 K/UL (ref 1.8–7.7)
NRBC BLD-RTO: 0.1 PER 100 WBC
NRBC BLD-RTO: 0.1 PER 100 WBC
O2 DELIVERY DEVICE: ABNORMAL
O2 SAT, ARTERIAL: 98 % (ref 94–100)
PATIENT TEMP: 37.5
PATIENT TEMP: 37.8
PCO2 ARTERIAL: 43.1 MMHG (ref 32–45)
PH ARTERIAL: 7.37 (ref 7.35–7.45)
PHOSPHATE SERPL-MCNC: 4 MG/DL (ref 2.5–4.5)
PLATELET # BLD AUTO: 116 K/UL (ref 138–453)
PLATELET # BLD AUTO: 153 K/UL (ref 138–453)
PLATELET, FLUORESCENCE: 153 K/UL (ref 138–453)
PMV BLD AUTO: 10.3 FL (ref 8.1–13.5)
PMV BLD AUTO: 10.6 FL (ref 8.1–13.5)
PO2 ARTERIAL: 110 MMHG (ref 75–95)
POC HCO3: 21.7 MMOL/L (ref 21–28)
POC HCO3: 24.8 MMOL/L (ref 21–28)
POC HCO3: 26 MMOL/L (ref 21–28)
POC HCO3: 26.5 MMOL/L (ref 21–28)
POC LACTIC ACID: 1.3 MMOL/L (ref 0.56–1.39)
POC LACTIC ACID: 1.4 MMOL/L (ref 0.56–1.39)
POC LACTIC ACID: 1.4 MMOL/L (ref 0.56–1.39)
POC O2 SATURATION: 96.3 % (ref 94–98)
POC O2 SATURATION: 98 % (ref 94–98)
POC O2 SATURATION: 98.9 % (ref 94–98)
POC O2 SATURATION: 99.2 % (ref 94–98)
POC PCO2 TEMP: 39.3 MM HG
POC PCO2 TEMP: 40.7 MM HG
POC PCO2: 34.8 MM HG (ref 35–48)
POC PCO2: 38 MM HG (ref 35–48)
POC PCO2: 39.4 MM HG (ref 35–48)
POC PCO2: 39.9 MM HG (ref 35–48)
POC PH TEMP: 7.34
POC PH TEMP: 7.44
POC PH: 7.34 (ref 7.35–7.45)
POC PH: 7.41 (ref 7.35–7.45)
POC PH: 7.45 (ref 7.35–7.45)
POC PH: 7.48 (ref 7.35–7.45)
POC PO2 TEMP: 112.6 MM HG
POC PO2 TEMP: 84.4 MM HG
POC PO2: 109.5 MM HG (ref 83–108)
POC PO2: 127.3 MM HG (ref 83–108)
POC PO2: 128 MM HG (ref 83–108)
POC PO2: 80.1 MM HG (ref 83–108)
POSITIVE BASE EXCESS, ART: 0.1 MMOL/L (ref 0–3)
POSITIVE BASE EXCESS, ART: 2.4 MMOL/L (ref 0–3)
POSITIVE BASE EXCESS, ART: 2.5 MMOL/L (ref 0–3)
POTASSIUM SERPL-SCNC: 4.1 MMOL/L (ref 3.7–5.3)
POTASSIUM SERPL-SCNC: 4.5 MMOL/L (ref 3.7–5.3)
POTASSIUM, WHOLE BLOOD: 3.4 MMOL/L (ref 3.6–5)
PROT SERPL-MCNC: 5.2 G/DL (ref 6.4–8.3)
RBC # BLD AUTO: 3.35 M/UL (ref 4.21–5.77)
RBC # BLD AUTO: 3.65 M/UL (ref 4.21–5.77)
SAMPLE SITE: ABNORMAL
SERVICE CMNT-IMP: ABNORMAL
SODIUM SERPL-SCNC: 135 MMOL/L (ref 135–144)
SODIUM SERPL-SCNC: 136 MMOL/L (ref 135–144)
SODIUM, WHOLE BLOOD: 132 MMOL/L (ref 136–145)
SPECIMEN DESCRIPTION: ABNORMAL
SPECIMEN DESCRIPTION: ABNORMAL
TRANSFUSION STATUS: NORMAL
UNIT DIVISION: 0
UNIT ISSUE DATE/TIME: NORMAL
WBC OTHER # BLD: 25.3 K/UL (ref 3.5–11.3)
WBC OTHER # BLD: 45.4 K/UL (ref 3.5–11.3)

## 2024-01-03 PROCEDURE — 0WUF0JZ SUPPLEMENT ABDOMINAL WALL WITH SYNTHETIC SUBSTITUTE, OPEN APPROACH: ICD-10-PCS | Performed by: SURGERY

## 2024-01-03 PROCEDURE — 2580000003 HC RX 258: Performed by: STUDENT IN AN ORGANIZED HEALTH CARE EDUCATION/TRAINING PROGRAM

## 2024-01-03 PROCEDURE — 80048 BASIC METABOLIC PNL TOTAL CA: CPT

## 2024-01-03 PROCEDURE — 82803 BLOOD GASES ANY COMBINATION: CPT

## 2024-01-03 PROCEDURE — 99223 1ST HOSP IP/OBS HIGH 75: CPT

## 2024-01-03 PROCEDURE — 2580000003 HC RX 258: Performed by: EMERGENCY MEDICINE

## 2024-01-03 PROCEDURE — 2580000003 HC RX 258: Performed by: NURSE PRACTITIONER

## 2024-01-03 PROCEDURE — 36415 COLL VENOUS BLD VENIPUNCTURE: CPT

## 2024-01-03 PROCEDURE — 6370000000 HC RX 637 (ALT 250 FOR IP): Performed by: STUDENT IN AN ORGANIZED HEALTH CARE EDUCATION/TRAINING PROGRAM

## 2024-01-03 PROCEDURE — 3600000014 HC SURGERY LEVEL 4 ADDTL 15MIN: Performed by: SURGERY

## 2024-01-03 PROCEDURE — 84100 ASSAY OF PHOSPHORUS: CPT

## 2024-01-03 PROCEDURE — 2500000003 HC RX 250 WO HCPCS: Performed by: NURSE PRACTITIONER

## 2024-01-03 PROCEDURE — 94003 VENT MGMT INPAT SUBQ DAY: CPT

## 2024-01-03 PROCEDURE — 6370000000 HC RX 637 (ALT 250 FOR IP)

## 2024-01-03 PROCEDURE — 37799 UNLISTED PX VASCULAR SURGERY: CPT

## 2024-01-03 PROCEDURE — 6360000002 HC RX W HCPCS: Performed by: EMERGENCY MEDICINE

## 2024-01-03 PROCEDURE — 2580000003 HC RX 258

## 2024-01-03 PROCEDURE — 83735 ASSAY OF MAGNESIUM: CPT

## 2024-01-03 PROCEDURE — 3700000000 HC ANESTHESIA ATTENDED CARE: Performed by: SURGERY

## 2024-01-03 PROCEDURE — 11005 DBRDMT SKIN ABDOMINAL WALL: CPT | Performed by: SURGERY

## 2024-01-03 PROCEDURE — 2000000000 HC ICU R&B

## 2024-01-03 PROCEDURE — 2700000000 HC OXYGEN THERAPY PER DAY

## 2024-01-03 PROCEDURE — 15778 IMPL ABSRB MSH/PRSTH DLY CLS: CPT | Performed by: SURGERY

## 2024-01-03 PROCEDURE — 2500000003 HC RX 250 WO HCPCS: Performed by: STUDENT IN AN ORGANIZED HEALTH CARE EDUCATION/TRAINING PROGRAM

## 2024-01-03 PROCEDURE — 93010 ELECTROCARDIOGRAM REPORT: CPT | Performed by: INTERNAL MEDICINE

## 2024-01-03 PROCEDURE — 85025 COMPLETE CBC W/AUTO DIFF WBC: CPT

## 2024-01-03 PROCEDURE — 99233 SBSQ HOSP IP/OBS HIGH 50: CPT | Performed by: INTERNAL MEDICINE

## 2024-01-03 PROCEDURE — 0KBL0ZZ EXCISION OF LEFT ABDOMEN MUSCLE, OPEN APPROACH: ICD-10-PCS | Performed by: SURGERY

## 2024-01-03 PROCEDURE — A4216 STERILE WATER/SALINE, 10 ML: HCPCS | Performed by: STUDENT IN AN ORGANIZED HEALTH CARE EDUCATION/TRAINING PROGRAM

## 2024-01-03 PROCEDURE — 6360000002 HC RX W HCPCS

## 2024-01-03 PROCEDURE — 0DH60UZ INSERTION OF FEEDING DEVICE INTO STOMACH, OPEN APPROACH: ICD-10-PCS | Performed by: SURGERY

## 2024-01-03 PROCEDURE — C1781 MESH (IMPLANTABLE): HCPCS | Performed by: SURGERY

## 2024-01-03 PROCEDURE — C9113 INJ PANTOPRAZOLE SODIUM, VIA: HCPCS | Performed by: STUDENT IN AN ORGANIZED HEALTH CARE EDUCATION/TRAINING PROGRAM

## 2024-01-03 PROCEDURE — 6360000002 HC RX W HCPCS: Performed by: STUDENT IN AN ORGANIZED HEALTH CARE EDUCATION/TRAINING PROGRAM

## 2024-01-03 PROCEDURE — 82805 BLOOD GASES W/O2 SATURATION: CPT

## 2024-01-03 PROCEDURE — 82330 ASSAY OF CALCIUM: CPT

## 2024-01-03 PROCEDURE — 6360000002 HC RX W HCPCS: Performed by: INTERNAL MEDICINE

## 2024-01-03 PROCEDURE — 85018 HEMOGLOBIN: CPT

## 2024-01-03 PROCEDURE — 84132 ASSAY OF SERUM POTASSIUM: CPT

## 2024-01-03 PROCEDURE — 94761 N-INVAS EAR/PLS OXIMETRY MLT: CPT

## 2024-01-03 PROCEDURE — 97606 NEG PRS WND THER DME>50 SQCM: CPT | Performed by: SURGERY

## 2024-01-03 PROCEDURE — 2500000003 HC RX 250 WO HCPCS

## 2024-01-03 PROCEDURE — 3700000001 HC ADD 15 MINUTES (ANESTHESIA): Performed by: SURGERY

## 2024-01-03 PROCEDURE — 2720000010 HC SURG SUPPLY STERILE: Performed by: SURGERY

## 2024-01-03 PROCEDURE — 80053 COMPREHEN METABOLIC PANEL: CPT

## 2024-01-03 PROCEDURE — 2580000003 HC RX 258: Performed by: SURGERY

## 2024-01-03 PROCEDURE — 99291 CRITICAL CARE FIRST HOUR: CPT | Performed by: SURGERY

## 2024-01-03 PROCEDURE — 83605 ASSAY OF LACTIC ACID: CPT

## 2024-01-03 PROCEDURE — 85014 HEMATOCRIT: CPT

## 2024-01-03 PROCEDURE — 6370000000 HC RX 637 (ALT 250 FOR IP): Performed by: SURGERY

## 2024-01-03 PROCEDURE — 6370000000 HC RX 637 (ALT 250 FOR IP): Performed by: NURSE PRACTITIONER

## 2024-01-03 PROCEDURE — 3600000004 HC SURGERY LEVEL 4 BASE: Performed by: SURGERY

## 2024-01-03 PROCEDURE — 43830 GSTRST OPEN WO CONSTJ TUBE: CPT | Performed by: SURGERY

## 2024-01-03 PROCEDURE — 2709999900 HC NON-CHARGEABLE SUPPLY: Performed by: SURGERY

## 2024-01-03 PROCEDURE — P9041 ALBUMIN (HUMAN),5%, 50ML: HCPCS

## 2024-01-03 PROCEDURE — 36600 WITHDRAWAL OF ARTERIAL BLOOD: CPT

## 2024-01-03 DEVICE — XENMATRIX AB SURGICAL GRAFT, 19 CM X 35 CM
Type: IMPLANTABLE DEVICE | Site: ABDOMEN | Status: FUNCTIONAL
Brand: XENMATRIX

## 2024-01-03 RX ORDER — FENTANYL CITRATE 50 UG/ML
50 INJECTION, SOLUTION INTRAMUSCULAR; INTRAVENOUS ONCE
Status: DISCONTINUED | OUTPATIENT
Start: 2024-01-03 | End: 2024-01-04

## 2024-01-03 RX ORDER — SODIUM CHLORIDE, SODIUM LACTATE, POTASSIUM CHLORIDE, CALCIUM CHLORIDE 600; 310; 30; 20 MG/100ML; MG/100ML; MG/100ML; MG/100ML
INJECTION, SOLUTION INTRAVENOUS CONTINUOUS
Status: DISCONTINUED | OUTPATIENT
Start: 2024-01-03 | End: 2024-01-05

## 2024-01-03 RX ORDER — ALBUMIN, HUMAN INJ 5% 5 %
SOLUTION INTRAVENOUS PRN
Status: DISCONTINUED | OUTPATIENT
Start: 2024-01-03 | End: 2024-01-03 | Stop reason: SDUPTHER

## 2024-01-03 RX ORDER — PROPOFOL 10 MG/ML
INJECTION, EMULSION INTRAVENOUS CONTINUOUS PRN
Status: DISCONTINUED | OUTPATIENT
Start: 2024-01-03 | End: 2024-01-03 | Stop reason: SDUPTHER

## 2024-01-03 RX ORDER — SODIUM CHLORIDE, SODIUM LACTATE, POTASSIUM CHLORIDE, AND CALCIUM CHLORIDE .6; .31; .03; .02 G/100ML; G/100ML; G/100ML; G/100ML
1000 INJECTION, SOLUTION INTRAVENOUS ONCE
Status: DISCONTINUED | OUTPATIENT
Start: 2024-01-03 | End: 2024-01-04

## 2024-01-03 RX ORDER — SODIUM CHLORIDE, SODIUM LACTATE, POTASSIUM CHLORIDE, CALCIUM CHLORIDE 600; 310; 30; 20 MG/100ML; MG/100ML; MG/100ML; MG/100ML
INJECTION, SOLUTION INTRAVENOUS CONTINUOUS PRN
Status: DISCONTINUED | OUTPATIENT
Start: 2024-01-03 | End: 2024-01-03 | Stop reason: SDUPTHER

## 2024-01-03 RX ORDER — MIDAZOLAM HYDROCHLORIDE 1 MG/ML
INJECTION INTRAMUSCULAR; INTRAVENOUS PRN
Status: DISCONTINUED | OUTPATIENT
Start: 2024-01-03 | End: 2024-01-03 | Stop reason: SDUPTHER

## 2024-01-03 RX ORDER — ROCURONIUM BROMIDE 10 MG/ML
INJECTION, SOLUTION INTRAVENOUS PRN
Status: DISCONTINUED | OUTPATIENT
Start: 2024-01-03 | End: 2024-01-03 | Stop reason: SDUPTHER

## 2024-01-03 RX ORDER — MAGNESIUM HYDROXIDE 1200 MG/15ML
LIQUID ORAL CONTINUOUS PRN
Status: COMPLETED | OUTPATIENT
Start: 2024-01-03 | End: 2024-01-03

## 2024-01-03 RX ADMIN — SODIUM CHLORIDE, POTASSIUM CHLORIDE, SODIUM LACTATE AND CALCIUM CHLORIDE: 600; 310; 30; 20 INJECTION, SOLUTION INTRAVENOUS at 19:17

## 2024-01-03 RX ADMIN — DEXMEDETOMIDINE HYDROCHLORIDE 0.4 MCG/KG/HR: 400 INJECTION, SOLUTION INTRAVENOUS at 21:21

## 2024-01-03 RX ADMIN — MIDAZOLAM 2 MG: 1 INJECTION INTRAMUSCULAR; INTRAVENOUS at 10:32

## 2024-01-03 RX ADMIN — Medication 2 MCG/MIN: at 09:00

## 2024-01-03 RX ADMIN — LINEZOLID 600 MG: 600 INJECTION, SOLUTION INTRAVENOUS at 10:22

## 2024-01-03 RX ADMIN — ALBUMIN (HUMAN) 500 ML: 12.5 INJECTION, SOLUTION INTRAVENOUS at 12:01

## 2024-01-03 RX ADMIN — INSULIN LISPRO 4 UNITS: 100 INJECTION, SOLUTION INTRAVENOUS; SUBCUTANEOUS at 08:31

## 2024-01-03 RX ADMIN — LINEZOLID 600 MG: 600 INJECTION, SOLUTION INTRAVENOUS at 22:44

## 2024-01-03 RX ADMIN — HEPARIN SODIUM 5000 UNITS: 5000 INJECTION INTRAVENOUS; SUBCUTANEOUS at 05:38

## 2024-01-03 RX ADMIN — ROCURONIUM BROMIDE 20 MG: 10 INJECTION, SOLUTION INTRAVENOUS at 11:33

## 2024-01-03 RX ADMIN — ROCURONIUM BROMIDE 20 MG: 10 INJECTION, SOLUTION INTRAVENOUS at 10:39

## 2024-01-03 RX ADMIN — SODIUM CHLORIDE, PRESERVATIVE FREE 10 ML: 5 INJECTION INTRAVENOUS at 08:18

## 2024-01-03 RX ADMIN — HEPARIN SODIUM 5000 UNITS: 5000 INJECTION INTRAVENOUS; SUBCUTANEOUS at 21:34

## 2024-01-03 RX ADMIN — MICONAZOLE NITRATE: 20 CREAM TOPICAL at 20:56

## 2024-01-03 RX ADMIN — DEXMEDETOMIDINE HYDROCHLORIDE 0.8 MCG/KG/HR: 400 INJECTION, SOLUTION INTRAVENOUS at 01:57

## 2024-01-03 RX ADMIN — INSULIN HUMAN 1 UNITS: 100 INJECTION, SOLUTION PARENTERAL at 10:27

## 2024-01-03 RX ADMIN — INSULIN LISPRO 4 UNITS: 100 INJECTION, SOLUTION INTRAVENOUS; SUBCUTANEOUS at 06:16

## 2024-01-03 RX ADMIN — AMIODARONE HYDROCHLORIDE 1 MG/MIN: 50 INJECTION, SOLUTION INTRAVENOUS at 22:27

## 2024-01-03 RX ADMIN — AMIODARONE HYDROCHLORIDE 1 MG/MIN: 50 INJECTION, SOLUTION INTRAVENOUS at 04:30

## 2024-01-03 RX ADMIN — ASPIRIN 81 MG 81 MG: 81 TABLET ORAL at 08:18

## 2024-01-03 RX ADMIN — MEROPENEM 1000 MG: 1 INJECTION, POWDER, FOR SOLUTION INTRAVENOUS at 21:07

## 2024-01-03 RX ADMIN — PROPOFOL 30 MCG/KG/MIN: 10 INJECTION, EMULSION INTRAVENOUS at 13:16

## 2024-01-03 RX ADMIN — ACETAMINOPHEN 650 MG: 325 TABLET ORAL at 05:30

## 2024-01-03 RX ADMIN — DEXMEDETOMIDINE HYDROCHLORIDE 0.8 MCG/KG/HR: 400 INJECTION, SOLUTION INTRAVENOUS at 05:35

## 2024-01-03 RX ADMIN — MEROPENEM 1000 MG: 1 INJECTION, POWDER, FOR SOLUTION INTRAVENOUS at 05:33

## 2024-01-03 RX ADMIN — INSULIN LISPRO 4 UNITS: 100 INJECTION, SOLUTION INTRAVENOUS; SUBCUTANEOUS at 02:19

## 2024-01-03 RX ADMIN — PANTOPRAZOLE SODIUM 40 MG: 40 INJECTION, POWDER, FOR SOLUTION INTRAVENOUS at 20:55

## 2024-01-03 RX ADMIN — ROCURONIUM BROMIDE 50 MG: 10 INJECTION, SOLUTION INTRAVENOUS at 13:16

## 2024-01-03 RX ADMIN — ROCURONIUM BROMIDE 50 MG: 10 INJECTION, SOLUTION INTRAVENOUS at 09:58

## 2024-01-03 RX ADMIN — ROCURONIUM BROMIDE 10 MG: 10 INJECTION, SOLUTION INTRAVENOUS at 12:37

## 2024-01-03 RX ADMIN — POTASSIUM CHLORIDE 20 MEQ: 29.8 INJECTION, SOLUTION INTRAVENOUS at 11:52

## 2024-01-03 RX ADMIN — MEROPENEM 1000 MG: 1 INJECTION, POWDER, FOR SOLUTION INTRAVENOUS at 15:46

## 2024-01-03 RX ADMIN — SODIUM CHLORIDE 4.56 UNITS/HR: 9 INJECTION, SOLUTION INTRAVENOUS at 09:25

## 2024-01-03 RX ADMIN — SODIUM CHLORIDE, POTASSIUM CHLORIDE, SODIUM LACTATE AND CALCIUM CHLORIDE: 600; 310; 30; 20 INJECTION, SOLUTION INTRAVENOUS at 12:13

## 2024-01-03 RX ADMIN — LEVOTHYROXINE SODIUM 200 MCG: 100 TABLET ORAL at 08:18

## 2024-01-03 RX ADMIN — Medication 200 MCG/HR: at 09:26

## 2024-01-03 RX ADMIN — INSULIN HUMAN 2 UNITS: 100 INJECTION, SOLUTION PARENTERAL at 11:37

## 2024-01-03 RX ADMIN — MICONAZOLE NITRATE: 20 CREAM TOPICAL at 08:18

## 2024-01-03 RX ADMIN — AMIODARONE HYDROCHLORIDE 1 MG/MIN: 50 INJECTION, SOLUTION INTRAVENOUS at 12:57

## 2024-01-03 RX ADMIN — SODIUM CHLORIDE: 9 INJECTION, SOLUTION INTRAVENOUS at 22:35

## 2024-01-03 RX ADMIN — SODIUM CHLORIDE, POTASSIUM CHLORIDE, SODIUM LACTATE AND CALCIUM CHLORIDE: 600; 310; 30; 20 INJECTION, SOLUTION INTRAVENOUS at 09:58

## 2024-01-03 RX ADMIN — FENTANYL CITRATE 50 MCG: 50 INJECTION, SOLUTION INTRAMUSCULAR; INTRAVENOUS at 06:51

## 2024-01-03 RX ADMIN — SODIUM CHLORIDE, SODIUM LACTATE, POTASSIUM CHLORIDE, AND CALCIUM CHLORIDE 1000 ML: .6; .31; .03; .02 INJECTION, SOLUTION INTRAVENOUS at 21:33

## 2024-01-03 RX ADMIN — VASOPRESSIN 0.04 UNITS/MIN: 20 INJECTION, SOLUTION INTRAVENOUS at 04:30

## 2024-01-03 ASSESSMENT — PULMONARY FUNCTION TESTS
PIF_VALUE: 27
PIF_VALUE: 25
PIF_VALUE: 26
PIF_VALUE: 21
PIF_VALUE: 26

## 2024-01-03 NOTE — OP NOTE
Operative Note      Patient: Ruben Dimas  YOB: 1965  MRN: [id number]    Date of Procedure: 1/3/2024    Pre-Op Diagnosis Codes:     * Necrotizing fasciitis (HCC) [M72.6]    Post-Op Diagnosis: Same       Procedure(s):  REOPEN LAPAROTOMY, WOUND VAC PLACEMENT, OPEN GASTROSTOMY TUBE PLACEMENT, REPAIR OF LARGE VENTRAL DEFECT WITH DEVON MATRIX, DEBRIDEMENT OF SUBQUTANEOUS TISSUE AND MUSCLE, ALBIN DRAINS X2    Surgeon(s):  Mary Carmen Monte MD    Assistant:   Resident: Yudith Phelps DO    Anesthesia: General    Estimated Blood Loss (mL): 20 cc    Complications: None    Specimens:   * No specimens in log *    Implants:  Implant Name Type Inv. Item Serial No.  Lot No. LRB No. Used Action   GRAFT BIO TISS W7.5XL13.8IN PORCINE DERM RIFAMPIN - [id number]  GRAFT BIO TISS W7.5XL13.8IN PORCINE DERM RIFAMPIN  BARD DAVOL-WD  N/A 1 Implanted   GRAFT BIO TISS W7.5XL13.8IN PORCINE DERM RIFAMPIN - [id number]  GRAFT BIO TISS W7.5XL13.8IN PORCINE DERM RIFAMPIN  BARD DAVOL-WD [id number] N/A 1 Implanted         Drains:   Closed/Suction Drain Lateral RUQ Bulb (Active)       Closed/Suction Drain Lateral RUQ Bulb (Active)       Negative Pressure Wound Therapy Abdomen Medial;Upper (Active)   Wound Type Surgical 01/03/24 1241   Dressing Type Black Foam 01/03/24 1241   Target Pressure (mmHg) 125 01/03/24 0800   Canister changed? Yes 01/03/24 1241   Dressing Status Clean, dry & intact;New dressing applied 01/03/24 1241   Dressing Changed Changed/New 01/03/24 1241   Drainage Amount Moderate 01/03/24 0800   Drainage Description Sanguinous 01/03/24 0800   Output (ml) 150 ml 01/03/24 0800   Wound Assessment Other (Comment) 01/03/24 0800   Yokasta-wound Assessment Other (Comment) 01/03/24 0400       NG/OG/NJ/NE Tube Nasogastric Right nostril (Active)   Securement device Tape 01/03/24 0800   Status Clamped 01/03/24 0800   Placement Verified X-Ray (Initial);Respiratory Status;External Catheter Length;Gastric Contents 01/03/24 0800

## 2024-01-03 NOTE — PROGRESS NOTES
ICU PROGRESS NOTE        PATIENT NAME: Ruben Dimas  MEDICAL RECORD NO. [id number]  DATE: 1/3/2024    HD: # 2    ASSESSMENT    Patient Active Problem List   Diagnosis    Alcoholic cirrhosis of liver (HCC), sober since 2013    Peripheral edema    Bipolar disorder (HCC)    Type 2 diabetes mellitus with diabetic neuropathy, with long-term current use of insulin (HCC)    Essential hypertension, currently hypotensive    Dyslipidemia    Weakness    Acute metabolic encephalopathy    FABI (obstructive sleep apnea)    Primary osteoarthritis of right knee    Type 2 diabetes mellitus with diabetic neuropathy (HCC)    Acquired hypothyroidism    Urine retention    Anemia    Slow transit constipation    Iron deficiency anemia due to chronic blood loss    Gastroesophageal reflux disease without esophagitis    LEONARDA (acute kidney injury) (HCC)    Secondary esophageal varices without bleeding (HCC)    Portal hypertension (HCC)    Obesity, morbid, BMI 50 or higher (HCC)    Venous stasis dermatitis of both lower extremities    Cellulitis of perineum    Chronic indwelling Clemons catheter    Anxiety and depression    Back pain, chronic    BPH (benign prostatic hyperplasia)    Chronic diastolic CHF (congestive heart failure) (HCC)    Cirrhosis (HCC)    Diabetes mellitus (HCC)    GERD (gastroesophageal reflux disease)    Hepatitis    Hiatal hernia    Hypertension, essential    IBS (irritable bowel syndrome)    Morbid obesity with BMI of 45.0-49.9, adult (HCC)    Neuropathy    Chronic respiratory failure with hypoxia, on home oxygen therapy (HCC)    Sleep apnea    Thyroid disease    Urinary bladder neurogenic dysfunction    Acute UTI    Musculoskeletal immobility    Pyocystis    Myoclonic jerking    Thrombocytopenia (HCC)    Hypotension    Sepsis with acute hypercapnic respiratory failure and septic shock (HCC)    Right lower lobe pneumonia    Acute kidney injury superimposed on CKD (HCC)    Somnolence    Acute respiratory acidosis (HCC)     25.3*   HGB 10.8* 11.5* 9.8*   HCT 33.7* 34.8* 29.2*   MCV 89.9 88.5 87.2   * 161 116*       BMP:   Recent Labs     01/02/24  0841 01/02/24  1623 01/03/24  0540    134* 136   K 3.9 4.3 4.1   CL 98 96* 99   CO2 21 21 23   BUN 51* 49* 47*   CREATININE 1.7* 1.6* 1.5*   GLUCOSE 192* 265* 266*           RADIOLOGY:      Domenica Horton DO  1/3/2024, 6:37 AM

## 2024-01-03 NOTE — PLAN OF CARE
Problem: OXYGENATION/RESPIRATORY FUNCTION  Goal: Patient will maintain patent airway  Outcome: Ongoing  Goal: Patient will achieve/maintain normal respiratory rate/effort  Respiratory rate and effort will be within normal limits for the patient  Outcome: Ongoing    Problem: MECHANICAL VENTILATION  Goal: Patient will maintain patent airway  Outcome: Ongoing  Goal: Oral health is maintained or improved  Outcome: Ongoing  Goal: ET tube will be managed safely  Outcome: Ongoing  Goal: Ability to express needs and understand communication  Outcome: Ongoing  Goal: Mobility/activity is maintained at optimum level for patient  Outcome: Ongoing    Problem: ASPIRATION PRECAUTIONS  Goal: Patient’s risk of aspiration is minimized  Outcome: Ongoing    Problem: SKIN INTEGRITY  Goal: Skin integrity is maintained or improved  Outcome: Ongoing

## 2024-01-03 NOTE — CARE COORDINATION
Patient intubated/sedated.  FiO2 40%; Peep 8.  Continuous IV meds   Amiodarone 1 mg/min   Precedex 1 mcg/kg/hr   Fentanyl 4 ml/hr   Levophed 6 mcg/min   Vasopressin 12 ml/hr  Attempted to call Richi Tovar [phone],  received.  Writer requested a Return phone call to .  HIPAA Compliant.

## 2024-01-03 NOTE — PLAN OF CARE
Problem: Discharge Planning  Goal: Discharge to home or other facility with appropriate resources  Outcome: Progressing     Problem: Pain  Goal: Verbalizes/displays adequate comfort level or baseline comfort level  Outcome: Progressing     Problem: Safety - Adult  Goal: Free from fall injury  Outcome: Progressing     Problem: Respiratory - Adult  Goal: Achieves optimal ventilation and oxygenation  1/3/2024 1643 by David Talley RN  Outcome: Progressing  1/3/2024 0851 by Zachraiah Crook RCP  Outcome: Progressing     Problem: Chronic Conditions and Co-morbidities  Goal: Patient's chronic conditions and co-morbidity symptoms are monitored and maintained or improved  Outcome: Progressing     Problem: Skin/Tissue Integrity  Goal: Absence of new skin breakdown  Description: 1.  Monitor for areas of redness and/or skin breakdown  2.  Assess vascular access sites hourly  3.  Every 4-6 hours minimum:  Change oxygen saturation probe site  4.  Every 4-6 hours:  If on nasal continuous positive airway pressure, respiratory therapy assess nares and determine need for appliance change or resting period.  Outcome: Progressing     Problem: ABCDS Injury Assessment  Goal: Absence of physical injury  Outcome: Progressing     Problem: Nutrition Deficit:  Goal: Optimize nutritional status  Outcome: Progressing

## 2024-01-03 NOTE — PROGRESS NOTES
Comprehensive Nutrition Assessment    Type and Reason for Visit:  Initial (Vent Check)    Nutrition Recommendations/Plan:   Continue NPO.  Monitor plans for nutrition and plan of care.  If nutrition support requested, suggest TPN to run at 41.7 mL/hr with 200 gm Dextrose and 50 gm AA along with 250 mL IV lipids.  If TF requested, suggest Peptide Based formula goal rate 65 mL/hr.     Malnutrition Assessment:  Malnutrition Status:  Insufficient data (01/03/24 1416)    Context:  Acute Illness     Findings of the 6 clinical characteristics of malnutrition:  Energy Intake:  Mild decrease in energy intake  Weight Loss:  Greater than 7.5% over 3 months     Body Fat Loss:  Unable to assess   Muscle Mass Loss:  Unable to assess  Fluid Accumulation:  Moderate to Severe Generalized   Strength:  Not Performed    Nutrition Assessment:    Admitted with c/o chest pain and SOB.  Pt with trach and PEG (placed 11/8/23).  PMH: chronic respiratory failure with tracheostomy; A-fib, CHF, DM, HTN, hypothyroidism, alcoholic liver cirrhosis.  Pt with large area of cellulitic changes in his abdominal wall surrounding his PEG tube site - found to have necrotizing fasciitis of the abdominal wall extending up into the chest.  S/p partial wedge gastrectomy due to infected PEG tube and dislodgement and debridement of abdomen and indwelling mesh removal,  due to concern for necrotizing fasciitis, wound vac in place on 1/2.  Back to OR today for reopen laparotomy, wound vac placement, open G-tube placement, repair of ventral defect, debridement, and ALBIN drain placements.  Currently intubated and sedated.  Will monitor plans for nutrition and provide recommendations for nutrition support.  Per chart review, h/o weight loss of 17.6% x 3 months noted.  Labs reviewed: Glucose 137-266 mg/dL.  Meds include: Insulin Drip.    Nutrition Related Findings:    labs/meds reviewed.  +Trach/PEG.  +NGT.  Hypoactive bowel sounds.   Wound Type: Surgical Incision,

## 2024-01-03 NOTE — BRIEF OP NOTE
Brief Postoperative Note      Patient: Ruben Dimas  YOB: 1965  MRN: [id number]    Date of Procedure: 1/3/2024    Pre-Op Diagnosis Codes:     * Necrotizing fasciitis (HCC) [M72.6]    Post-Op Diagnosis: Same       Procedure(s):  REOPEN LAPAROTOMY, WOUND VAC PLACEMENT, OPEN GASTROSTOMY TUBE PLACEMENT, REPAIR OF LARGE VENTRAL DEFECT WITH DEVON MATRIX, DEBRIDEMENT OF SUBQUTANEOUS TISSUE AND MUSCLE, ALBIN DRAINS X2    Surgeon(s):  Mary Carmen Monte MD    Assistant:  Resident: Yudith Phelps DO    Anesthesia: General    Estimated Blood Loss (mL): 20 cc     Complications: None    Specimens:   * No specimens in log *    Implants:  Implant Name Type Inv. Item Serial No.  Lot No. LRB No. Used Action   GRAFT BIO TISS W7.5XL13.8IN PORCINE DERM RIFAMPIN - [id number]  GRAFT BIO TISS W7.5XL13.8IN PORCINE DERM RIFAMPIN  BARD DAVOL-WD  N/A 1 Implanted   GRAFT BIO TISS W7.5XL13.8IN PORCINE DERM RIFAMPIN - [id number]  GRAFT BIO TISS W7.5XL13.8IN PORCINE DERM RIFAMPIN  BARD DAVOL-WD [id number] N/A 1 Implanted         Drains:   Closed/Suction Drain Lateral RUQ Bulb (Active)       Closed/Suction Drain Lateral RUQ Bulb (Active)       Negative Pressure Wound Therapy Abdomen Medial;Upper (Active)   Wound Type Surgical 01/03/24 1241   Dressing Type Black Foam 01/03/24 1241   Target Pressure (mmHg) 125 01/03/24 0800   Canister changed? Yes 01/03/24 1241   Dressing Status Clean, dry & intact;New dressing applied 01/03/24 1241   Dressing Changed Changed/New 01/03/24 1241   Drainage Amount Moderate 01/03/24 0800   Drainage Description Sanguinous 01/03/24 0800   Output (ml) 150 ml 01/03/24 0800   Wound Assessment Other (Comment) 01/03/24 0800   Yokasta-wound Assessment Other (Comment) 01/03/24 0400       NG/OG/NJ/NE Tube Nasogastric Right nostril (Active)   Securement device Tape 01/03/24 0800   Status Clamped 01/03/24 0800   Placement Verified X-Ray (Initial);Respiratory Status;External Catheter Length;Gastric Contents

## 2024-01-03 NOTE — PROGRESS NOTES
[facility]   Progress Note                   Date:   1/3/2024  Patient name: Ruben Dimas  Date of admission:  1/1/2024  2:37 PM  MRN:   [id number]  YOB: 1965  PCP: Ramy Lazo MD    Reason for Admission: sepsis    Subjective:       Seen and examined. Events yesterday noted. In afib this am, rate controlled. On 2 vasopressors. On fentanyl and precedex.       Medications:   Scheduled Meds:   linezolid  600 mg IntraVENous Q12H    aspirin  81 mg Per NG tube Daily    pantoprazole (PROTONIX) 40 mg in sodium chloride (PF) 0.9 % 10 mL injection  40 mg IntraVENous Q12H    sodium chloride flush  5-40 mL IntraVENous 2 times per day    levothyroxine  200 mcg Per G Tube Daily    meropenem  1,000 mg IntraVENous Q8H    heparin (porcine)  5,000 Units SubCUTAneous 3 times per day    insulin lispro  0-8 Units SubCUTAneous Q4H    miconazole   Topical BID     Continuous Infusions:   fentaNYL 50 mcg/mL 200 mcg/hr (01/03/24 0303)    dextrose      propofol Stopped (01/02/24 1356)    vasopressin 20 Units in sodium chloride 0.9 % 100 mL infusion 0.04 Units/min (01/03/24 0430)    dexmedeTOMIDine 0.8 mcg/kg/hr (01/03/24 0535)    sodium chloride 10 mL/hr at 01/03/24 0303    norepinephrine 10 mcg/min (01/03/24 0303)    amiodarone 1 mg/min (01/03/24 0430)     CBC:   Recent Labs     01/02/24  0841 01/02/24  1623 01/03/24  0540   WBC 27.2* 44.0* 25.3*   HGB 10.8* 11.5* 9.8*   * 161 116*     BMP:    Recent Labs     01/02/24  0841 01/02/24  1623 01/03/24  0540    134* 136   K 3.9 4.3 4.1   CL 98 96* 99   CO2 21 21 23   BUN 51* 49* 47*   CREATININE 1.7* 1.6* 1.5*   GLUCOSE 192* 265* 266*     Hepatic:   Recent Labs     01/02/24  0121 01/02/24  1821 01/03/24  0540   AST 85* 81* 61*   ALT 35 38 35   BILITOT 2.2* 5.3* 4.2*   ALKPHOS 224* 125 122     Troponin: No results for input(s): \"TROPONINI\" in the last 72 hours.  BNP: No results for input(s): \"BNP\" in the last 72 hours.  Lipids: No results for    A-fib with RVR, not on AC due to hematuria and history of GI bleed  Preserved LVEF  Septic shock likely due to abdominal wall cellulitis/Nec fasciitis s/p partial wedge gastrectomy  Acute HF pEF, cor pulmonale, Elevated proBNP  Severe pulm HTN  Chronic indwelling Clemons's catheter  Acute kidney injury on CKD stage IIIa  Hx tracheostomy and PEG  Hyponatremia  Lactic acidosis  History of COPD  History of MDRO UTI with Enterococcus and Citrobacter  History of alcoholic cirrhosis  Iron deficiency anemia  Essential hypertension  Hyperlipidemia  Type 2 diabetes mellitus  Chronic lymphedema      Plan of Treatment:   In afib this AM, rate controlled. On levop and vasopressin. On sedation  IV amiodarone  S/p digoxin 125 and 250 and amiodarone bolus  Digoxin 125 mcg daily as needed for HR > 110. Check dig level  Patient is not on anticoagulation due to history of hematuria and GI bleed  Continue with aspirin  Consider switching Levophed to Cipriano-Synephrine  Consult nephrology for LEONARDA and volume management  F/u TTE limited    Electronically signed by Sukhwinder Smith MD on 1/3/2024 at 6:31 AM  [facility]  [phone]      Attending Physician Statement  I have discussed the care of Ruben Dimas, including pertinent history and exam findings,  with the cardiology fellow/resident. I have seen and examined the patient and the key elements of all parts of the encounter have been performed by me. I have completed at least one if not all key elements of the E/M (history, physical exam, and MDM). I agree with the assessment, plan and orders as documented by the resident .       Reno Cristina MD, FACC, [facility]

## 2024-01-03 NOTE — PLAN OF CARE
Problem: Discharge Planning  Goal: Discharge to home or other facility with appropriate resources  1/3/2024 0037 by Apolinar Cruz RN  Outcome: Progressing  1/2/2024 1859 by David Talley RN  Outcome: Progressing  1/2/2024 1040 by Carla Nix RN  Outcome: Progressing     Problem: Pain  Goal: Verbalizes/displays adequate comfort level or baseline comfort level  1/3/2024 0037 by Apolinar Cruz RN  Outcome: Progressing  1/2/2024 1859 by David Talley RN  Outcome: Progressing  1/2/2024 1040 by Carla Nix RN  Outcome: Progressing     Problem: Safety - Adult  Goal: Free from fall injury  1/3/2024 0037 by Apolinar Cruz RN  Outcome: Progressing  1/2/2024 1859 by David Talley RN  Outcome: Progressing  1/2/2024 1040 by Carla Nix RN  Outcome: Progressing     Problem: Respiratory - Adult  Goal: Achieves optimal ventilation and oxygenation  1/3/2024 0037 by Apolinar Cruz RN  Outcome: Progressing  1/2/2024 1946 by Sally Denny RCP  Outcome: Progressing  1/2/2024 1859 by David Talley RN  Outcome: Progressing  1/2/2024 1040 by Carla Nix RN  Outcome: Progressing     Problem: Chronic Conditions and Co-morbidities  Goal: Patient's chronic conditions and co-morbidity symptoms are monitored and maintained or improved  1/3/2024 0037 by Apolinar Cruz RN  Outcome: Progressing  1/2/2024 1859 by David Talley RN  Outcome: Progressing  1/2/2024 1040 by Carla Nix RN  Outcome: Progressing     Problem: Skin/Tissue Integrity  Goal: Absence of new skin breakdown  Description: 1.  Monitor for areas of redness and/or skin breakdown  2.  Assess vascular access sites hourly  3.  Every 4-6 hours minimum:  Change oxygen saturation probe site  4.  Every 4-6 hours:  If on nasal continuous positive airway pressure, respiratory therapy assess nares and determine need for appliance change or resting period.  1/3/2024 0037 by Apolinar Cruz RN  Outcome: Progressing  1/2/2024 1859 by David Talley, RN  Outcome:

## 2024-01-03 NOTE — PLAN OF CARE
Problem: Respiratory - Adult  Goal: Achieves optimal ventilation and oxygenation  1/3/2024 0851 by Zachariah Crook, AGNIESZKA  Outcome: Progressing

## 2024-01-03 NOTE — CONSULTS
Palliative Care Inpatient Consult    NAME:  Ruben Dimas  MEDICAL RECORD NUMBER:  [id number]  AGE: 58 y.o.   GENDER: male  : 1965  TODAY'S DATE:  1/3/2024    Reasons for Consultation:    Symptom and/or pain management  Provision of information regarding PC and/or hospice philosophies  Complex, time-intensive communication and interdisciplinary psychosocial support  Clarification of goals of care and/or assistance with difficult decision-making  Guidance in regards to resources and transition(s)    Members of PC team contributing to this consultation are : Annemarie Weiner, Palliative Care APRN-CNP    Plan      Palliative Interaction: Patient in OR during rounds.     Called and spoke with patients son Richi. Introduced myself and reintroduced the palliative role as our team was involved during patients last admission. Richi recalls this.     Richi shares patient had been doing well since being discharged, he states he was transferred from [facility] to [facility]. He states he was doing well, he was able to facetime and text and communicate by mouthing words and nodding. Richi states being shocked by the recent events as he felt the patient was improving.     We again rediscuss goals of care. Richi states that patients and families wishes remain the same and that is to continue with aggressive treatments, he states this to be the patients wishes. Richi states all siblings have been updated and all are in contact with one another regarding patients status.     As previously documented patient has four adult children including Richi, Igor, Tony, and Marta. Should any decisions need to be made for patient, majority of children would need to be in agreement.     Palliative to continue to follow.     Education/support to staff  Education/support to family  Education/support to patient  Discharge planning/helping to coordinate care  Communications with primary service  Clarification of medical condition  caliber.    Mediastinum: Tracheostomy tube in place.  No evidence of mediastinal  lymphadenopathy.  The heart and pericardium demonstrate no acute abnormality.  There is no acute abnormality of the thoracic aorta.    Lungs/pleura: Trace pleural effusions and dependent atelectasis, right  greater than left.  No septal thickening identified.  The central airway is  patent.  Expiratory phase of imaging and mild respiratory motion artifact.    Soft Tissues/Bones: Subcutaneous emphysema in the right chest is noted,  tracking from the upper abdomen.    ABDOMEN/PELVIS:    Organs: The liver, gallbladder, pancreas, spleen, adrenals and kidneys reveal  no acute findings.  Nodular liver surface features again demonstrated.  Bilateral punctate nephrolithiasis.    GI/Bowel: The gastrostomy tube is malpositioned, terminating in the abdominal  wall.  No bowel obstruction.  No wall thickening identified.  Mild stool  burden.    Pelvis: No acute findings.    Peritoneum/Retroperitoneum: No free air or free fluid.  The aorta is normal  in caliber.  The visceral branches are patent. No lymphadenopathy.    Bones/Soft Tissues: Extensive subcutaneous emphysema in the anterior and  extending to the left side.  Sequela of abdominal wall repair with mesh.  No  acute osseous abnormality identified.    *Unless otherwise specified, incidental findings do not require dedicated  imaging follow-up.    Impression  1.  Malpositioned gastrostomy tube, retracted into the abdominal wall.  Associated extensive subcutaneous emphysema is noted.    2.  No evidence for acute pulmonary embolism, within the limitations of  motion artifact.    3.  Trace pleural effusions and dependent atelectasis, right greater than  left.    4.  Punctate bilateral nephrolithiasis.    The findings were sent to the Radiology Results Communication Center at 1:24  am on 1/2/2024 to be communicated to a licensed caregiver.       Xray Result (most recent):  XR ABDOMEN FOR NG/OG/NE

## 2024-01-03 NOTE — ANESTHESIA POSTPROCEDURE EVALUATION
Department of Anesthesiology  Postprocedure Note    Patient: Ruben Dimas  MRN: [id number]  YOB: 1965  Date of evaluation: 1/3/2024    Procedure Summary       Date: 01/03/24 Room / Location: [address]    Anesthesia Start: 0952 Anesthesia Stop: 1334    Procedure: REOPEN LAPAROTOMY, WOUND VAC PLACEMENT, OPEN GASTROSTOMY TUBE PLACEMENT, REPAIR OF LARGE VENTRAL DEFECT WITH DEVON MATRIX, DEBRIDEMENT OF SUBQUTANEOUS TISSUE AND MUSCLE, ALBIN DRAINS X2 Diagnosis:       Necrotizing fasciitis (HCC)      (Necrotizing fasciitis (HCC) [M72.6])    Surgeons: Mary Carmen Monte MD Responsible Provider: Beverly June MD    Anesthesia Type: general ASA Status: 4            Anesthesia Type: No value filed.    Des Phase I:      Des Phase II:      Anesthesia Post Evaluation    Patient location during evaluation: ICU  Patient participation: complete - patient cannot participate  Level of consciousness: sedated and ventilated  Pain score: 1  Airway patency: patent  Nausea & Vomiting: no nausea and no vomiting  Cardiovascular status: hemodynamically stable  Respiratory status: acceptable  Hydration status: euvolemic  Pain management: adequate    No notable events documented.

## 2024-01-03 NOTE — PLAN OF CARE
Problem: Discharge Planning  Goal: Discharge to home or other facility with appropriate resources  1/2/2024 1859 by David Talley RN  Outcome: Progressing     Problem: Pain  Goal: Verbalizes/displays adequate comfort level or baseline comfort level  1/2/2024 1859 by David Talley RN  Outcome: Progressing     Problem: Safety - Adult  Goal: Free from fall injury  1/2/2024 1859 by David Talley RN  Outcome: Progressing     Problem: Respiratory - Adult  Goal: Achieves optimal ventilation and oxygenation  1/2/2024 1859 by David Talley RN  Outcome: Progressing     Problem: Chronic Conditions and Co-morbidities  Goal: Patient's chronic conditions and co-morbidity symptoms are monitored and maintained or improved  1/2/2024 1859 by David Talley RN  Outcome: Progressing     Problem: Skin/Tissue Integrity  Goal: Absence of new skin breakdown  Description: 1.  Monitor for areas of redness and/or skin breakdown  2.  Assess vascular access sites hourly  3.  Every 4-6 hours minimum:  Change oxygen saturation probe site  4.  Every 4-6 hours:  If on nasal continuous positive airway pressure, respiratory therapy assess nares and determine need for appliance change or resting period.  1/2/2024 1859 by David Talley RN  Outcome: Progressing     Problem: ABCDS Injury Assessment  Goal: Absence of physical injury  1/2/2024 1859 by David Talley RN  Outcome: Progressing

## 2024-01-03 NOTE — ACP (ADVANCE CARE PLANNING)
Advance Care Planning     Advance Care Planning (ACP) Physician/NP/PA Conversation    Date of Conversation: 1/1/2024  Conducted with:  Healthcare Decision Maker: Next of Kin by law (only applies in absence of above) (name) Patients four adult children   Richi iDmas     Healthcare Decision Maker:    Primary Decision Maker (Active): Richi Dimas - Child - [phone]    Primary Decision Maker: Igor Dimas - Child - [phone]  Click here to complete Healthcare Decision Makers including selection of the Healthcare Decision Maker Relationship (ie \"Primary\").         Care Preferences:    Hospitalization:  \"If your health worsens and it becomes clear that your chance of recovery is unlikely, what would be your preference regarding hospitalization?\"  The patient would prefer hospitalization.    Ventilation:  \"If you were unable to breath on your own and your chance of recovery was unlikely, what would be your preference about the use of a ventilator (breathing machine) if it was available to you?\"  Currently intubated     Resuscitation:  \"In the event your heart stopped as a result of an underlying serious health condition, would you want attempts made to restart your heart, or would you prefer a natural death?\"  Yes, attempt to resuscitate.    treatment goals    Conversation Outcomes / Follow-Up Plan:  ACP in process - information provided, considering goals and options  Reviewed DNR/DNI and patient elects Full Code (Attempt Resuscitation)    Length of Voluntary ACP Conversation in minutes:  <16 minutes (Non-Billable)    Annemarie Weiner, APRN - CNP

## 2024-01-04 ENCOUNTER — APPOINTMENT (OUTPATIENT)
Dept: GENERAL RADIOLOGY | Age: 59
DRG: 853 | End: 2024-01-04
Payer: MEDICARE

## 2024-01-04 ENCOUNTER — ANESTHESIA EVENT (OUTPATIENT)
Dept: OPERATING ROOM | Age: 59
End: 2024-01-04
Payer: MEDICARE

## 2024-01-04 PROBLEM — J15.1 PNEUMONIA OF LEFT LOWER LOBE DUE TO PSEUDOMONAS SPECIES (HCC): Status: ACTIVE | Noted: 2024-01-04

## 2024-01-04 PROBLEM — D72.823 LEUKEMOID REACTION: Status: ACTIVE | Noted: 2024-01-04

## 2024-01-04 LAB
ABO/RH: NORMAL
ALBUMIN SERPL-MCNC: 2.7 G/DL (ref 3.5–5.2)
ALBUMIN/GLOB SERPL: 1 {RATIO} (ref 1–2.5)
ALLEN TEST: ABNORMAL
ALP SERPL-CCNC: 135 U/L (ref 40–129)
ALT SERPL-CCNC: 24 U/L (ref 5–41)
ANION GAP SERPL CALCULATED.3IONS-SCNC: 13 MMOL/L (ref 9–17)
ANTIBODY SCREEN: NEGATIVE
ARM BAND NUMBER: NORMAL
AST SERPL-CCNC: 38 U/L
BASOPHILS # BLD: 0 K/UL (ref 0–0.2)
BASOPHILS NFR BLD: 0 % (ref 0–2)
BILIRUB SERPL-MCNC: 3.6 MG/DL (ref 0.3–1.2)
BLOOD BANK BLOOD PRODUCT EXPIRATION DATE: NORMAL
BLOOD BANK DISPENSE STATUS: NORMAL
BLOOD BANK ISBT PRODUCT BLOOD TYPE: 6200
BLOOD BANK PRODUCT CODE: NORMAL
BLOOD BANK SAMPLE EXPIRATION: NORMAL
BLOOD BANK UNIT TYPE AND RH: NORMAL
BPU ID: NORMAL
BUN SERPL-MCNC: 43 MG/DL (ref 6–20)
CA-I BLD-SCNC: 1.13 MMOL/L (ref 1.13–1.33)
CALCIUM SERPL-MCNC: 8.4 MG/DL (ref 8.6–10.4)
CHLORIDE SERPL-SCNC: 99 MMOL/L (ref 98–107)
CO2 SERPL-SCNC: 23 MMOL/L (ref 20–31)
COMPONENT: NORMAL
CREAT SERPL-MCNC: 1.6 MG/DL (ref 0.7–1.2)
CROSSMATCH RESULT: NORMAL
EOSINOPHIL # BLD: 0 K/UL (ref 0–0.4)
EOSINOPHILS RELATIVE PERCENT: 0 % (ref 1–4)
ERYTHROCYTE [DISTWIDTH] IN BLOOD BY AUTOMATED COUNT: 18.6 % (ref 11.8–14.4)
FIO2: 30
FIO2: 40
GFR SERPL CREATININE-BSD FRML MDRD: 49 ML/MIN/1.73M2
GLUCOSE BLD-MCNC: 118 MG/DL (ref 75–110)
GLUCOSE BLD-MCNC: 122 MG/DL (ref 75–110)
GLUCOSE BLD-MCNC: 126 MG/DL (ref 75–110)
GLUCOSE BLD-MCNC: 131 MG/DL (ref 75–110)
GLUCOSE BLD-MCNC: 133 MG/DL (ref 75–110)
GLUCOSE BLD-MCNC: 135 MG/DL (ref 74–100)
GLUCOSE BLD-MCNC: 138 MG/DL (ref 75–110)
GLUCOSE BLD-MCNC: 140 MG/DL (ref 75–110)
GLUCOSE BLD-MCNC: 147 MG/DL (ref 75–110)
GLUCOSE BLD-MCNC: 154 MG/DL (ref 75–110)
GLUCOSE BLD-MCNC: 155 MG/DL (ref 74–100)
GLUCOSE BLD-MCNC: 157 MG/DL (ref 75–110)
GLUCOSE BLD-MCNC: 180 MG/DL (ref 75–110)
GLUCOSE BLD-MCNC: 181 MG/DL (ref 75–110)
GLUCOSE BLD-MCNC: 181 MG/DL (ref 75–110)
GLUCOSE BLD-MCNC: 182 MG/DL (ref 75–110)
GLUCOSE BLD-MCNC: 191 MG/DL (ref 75–110)
GLUCOSE BLD-MCNC: 196 MG/DL (ref 75–110)
GLUCOSE BLD-MCNC: 212 MG/DL (ref 75–110)
GLUCOSE BLD-MCNC: 214 MG/DL (ref 75–110)
GLUCOSE BLD-MCNC: 225 MG/DL (ref 75–110)
GLUCOSE BLD-MCNC: 99 MG/DL (ref 75–110)
GLUCOSE SERPL-MCNC: 216 MG/DL (ref 70–99)
HCT VFR BLD AUTO: 28.8 % (ref 40.7–50.3)
HGB BLD-MCNC: 9.5 G/DL (ref 13–17)
IMM GRANULOCYTES # BLD AUTO: 2.17 K/UL (ref 0–0.3)
IMM GRANULOCYTES NFR BLD: 5 %
LYMPHOCYTES NFR BLD: 1.3 K/UL (ref 1–4.8)
LYMPHOCYTES RELATIVE PERCENT: 3 % (ref 24–44)
MAGNESIUM SERPL-MCNC: 1.8 MG/DL (ref 1.6–2.6)
MCH RBC QN AUTO: 29.3 PG (ref 25.2–33.5)
MCHC RBC AUTO-ENTMCNC: 33 G/DL (ref 28.4–34.8)
MCV RBC AUTO: 88.9 FL (ref 82.6–102.9)
MICROORGANISM SPEC CULT: ABNORMAL
MICROORGANISM SPEC CULT: ABNORMAL
MICROORGANISM/AGENT SPEC: ABNORMAL
MODE: NORMAL
MONOCYTES NFR BLD: 0.43 K/UL (ref 0.1–0.8)
MONOCYTES NFR BLD: 1 % (ref 1–7)
MORPHOLOGY: ABNORMAL
NEGATIVE BASE EXCESS, ART: 0.1 MMOL/L (ref 0–2)
NEGATIVE BASE EXCESS, ART: 1.6 MMOL/L (ref 0–2)
NEUTROPHILS NFR BLD: 91 % (ref 36–66)
NEUTS SEG NFR BLD: 39.4 K/UL (ref 1.8–7.7)
NRBC BLD-RTO: 0 PER 100 WBC
O2 DELIVERY DEVICE: ABNORMAL
O2 DELIVERY DEVICE: NORMAL
PHOSPHATE SERPL-MCNC: 4.4 MG/DL (ref 2.5–4.5)
PLATELET # BLD AUTO: 141 K/UL (ref 138–453)
PMV BLD AUTO: 9.7 FL (ref 8.1–13.5)
POC HCO3: 23.2 MMOL/L (ref 21–28)
POC HCO3: 24.4 MMOL/L (ref 21–28)
POC LACTIC ACID: 0.5 MMOL/L (ref 0.56–1.39)
POC LACTIC ACID: 1.6 MMOL/L (ref 0.56–1.39)
POC O2 SATURATION: 96.6 % (ref 94–98)
POC O2 SATURATION: 99.4 % (ref 94–98)
POC PCO2: 38 MM HG (ref 35–48)
POC PCO2: 38.4 MM HG (ref 35–48)
POC PH: 7.39 (ref 7.35–7.45)
POC PH: 7.42 (ref 7.35–7.45)
POC PO2: 160.3 MM HG (ref 83–108)
POC PO2: 84.8 MM HG (ref 83–108)
POTASSIUM SERPL-SCNC: 3.8 MMOL/L (ref 3.7–5.3)
PROT SERPL-MCNC: 5.3 G/DL (ref 6.4–8.3)
RBC # BLD AUTO: 3.24 M/UL (ref 4.21–5.77)
SAMPLE SITE: ABNORMAL
SAMPLE SITE: NORMAL
SODIUM SERPL-SCNC: 135 MMOL/L (ref 135–144)
SPECIMEN DESCRIPTION: ABNORMAL
TRANSFUSION STATUS: NORMAL
UNIT DIVISION: 0
UNIT ISSUE DATE/TIME: NORMAL
WBC OTHER # BLD: 43.3 K/UL (ref 3.5–11.3)

## 2024-01-04 PROCEDURE — 71045 X-RAY EXAM CHEST 1 VIEW: CPT

## 2024-01-04 PROCEDURE — 6360000002 HC RX W HCPCS: Performed by: STUDENT IN AN ORGANIZED HEALTH CARE EDUCATION/TRAINING PROGRAM

## 2024-01-04 PROCEDURE — 2580000003 HC RX 258: Performed by: NURSE PRACTITIONER

## 2024-01-04 PROCEDURE — 2500000003 HC RX 250 WO HCPCS: Performed by: STUDENT IN AN ORGANIZED HEALTH CARE EDUCATION/TRAINING PROGRAM

## 2024-01-04 PROCEDURE — 2000000000 HC ICU R&B

## 2024-01-04 PROCEDURE — C9113 INJ PANTOPRAZOLE SODIUM, VIA: HCPCS | Performed by: STUDENT IN AN ORGANIZED HEALTH CARE EDUCATION/TRAINING PROGRAM

## 2024-01-04 PROCEDURE — 82330 ASSAY OF CALCIUM: CPT

## 2024-01-04 PROCEDURE — 6370000000 HC RX 637 (ALT 250 FOR IP): Performed by: STUDENT IN AN ORGANIZED HEALTH CARE EDUCATION/TRAINING PROGRAM

## 2024-01-04 PROCEDURE — 94761 N-INVAS EAR/PLS OXIMETRY MLT: CPT

## 2024-01-04 PROCEDURE — 36620 INSERTION CATHETER ARTERY: CPT

## 2024-01-04 PROCEDURE — 99233 SBSQ HOSP IP/OBS HIGH 50: CPT | Performed by: INTERNAL MEDICINE

## 2024-01-04 PROCEDURE — 6360000002 HC RX W HCPCS: Performed by: SURGERY

## 2024-01-04 PROCEDURE — 84100 ASSAY OF PHOSPHORUS: CPT

## 2024-01-04 PROCEDURE — 2580000003 HC RX 258: Performed by: STUDENT IN AN ORGANIZED HEALTH CARE EDUCATION/TRAINING PROGRAM

## 2024-01-04 PROCEDURE — 2700000000 HC OXYGEN THERAPY PER DAY

## 2024-01-04 PROCEDURE — 82803 BLOOD GASES ANY COMBINATION: CPT

## 2024-01-04 PROCEDURE — 85025 COMPLETE CBC W/AUTO DIFF WBC: CPT

## 2024-01-04 PROCEDURE — 83735 ASSAY OF MAGNESIUM: CPT

## 2024-01-04 PROCEDURE — 6370000000 HC RX 637 (ALT 250 FOR IP): Performed by: SURGERY

## 2024-01-04 PROCEDURE — 37799 UNLISTED PX VASCULAR SURGERY: CPT

## 2024-01-04 PROCEDURE — 99291 CRITICAL CARE FIRST HOUR: CPT | Performed by: SURGERY

## 2024-01-04 PROCEDURE — 94003 VENT MGMT INPAT SUBQ DAY: CPT

## 2024-01-04 PROCEDURE — 83605 ASSAY OF LACTIC ACID: CPT

## 2024-01-04 PROCEDURE — A4216 STERILE WATER/SALINE, 10 ML: HCPCS | Performed by: STUDENT IN AN ORGANIZED HEALTH CARE EDUCATION/TRAINING PROGRAM

## 2024-01-04 PROCEDURE — 03HY32Z INSERTION OF MONITORING DEVICE INTO UPPER ARTERY, PERCUTANEOUS APPROACH: ICD-10-PCS | Performed by: SURGERY

## 2024-01-04 PROCEDURE — 6370000000 HC RX 637 (ALT 250 FOR IP): Performed by: NURSE PRACTITIONER

## 2024-01-04 PROCEDURE — 80053 COMPREHEN METABOLIC PANEL: CPT

## 2024-01-04 PROCEDURE — 6360000002 HC RX W HCPCS

## 2024-01-04 PROCEDURE — 2580000003 HC RX 258: Performed by: SURGERY

## 2024-01-04 PROCEDURE — P9047 ALBUMIN (HUMAN), 25%, 50ML: HCPCS | Performed by: STUDENT IN AN ORGANIZED HEALTH CARE EDUCATION/TRAINING PROGRAM

## 2024-01-04 PROCEDURE — 82947 ASSAY GLUCOSE BLOOD QUANT: CPT

## 2024-01-04 RX ORDER — ACETAMINOPHEN 160 MG/5ML
1000 LIQUID ORAL EVERY 8 HOURS SCHEDULED
Status: DISCONTINUED | OUTPATIENT
Start: 2024-01-04 | End: 2024-01-22

## 2024-01-04 RX ORDER — ALBUMIN (HUMAN) 12.5 G/50ML
25 SOLUTION INTRAVENOUS ONCE
Status: COMPLETED | OUTPATIENT
Start: 2024-01-04 | End: 2024-01-04

## 2024-01-04 RX ORDER — MAGNESIUM SULFATE 1 G/100ML
1000 INJECTION INTRAVENOUS ONCE
Status: COMPLETED | OUTPATIENT
Start: 2024-01-04 | End: 2024-01-04

## 2024-01-04 RX ORDER — POTASSIUM CHLORIDE 29.8 MG/ML
20 INJECTION INTRAVENOUS ONCE
Status: COMPLETED | OUTPATIENT
Start: 2024-01-04 | End: 2024-01-04

## 2024-01-04 RX ORDER — SENNOSIDES 8.8 MG/5ML
5 LIQUID ORAL 2 TIMES DAILY
Status: DISCONTINUED | OUTPATIENT
Start: 2024-01-04 | End: 2024-01-31 | Stop reason: HOSPADM

## 2024-01-04 RX ADMIN — AMIODARONE HYDROCHLORIDE 1 MG/MIN: 50 INJECTION, SOLUTION INTRAVENOUS at 06:05

## 2024-01-04 RX ADMIN — SODIUM CHLORIDE, PRESERVATIVE FREE 10 ML: 5 INJECTION INTRAVENOUS at 09:05

## 2024-01-04 RX ADMIN — ACETAMINOPHEN 1000 MG: 650 SOLUTION ORAL at 14:24

## 2024-01-04 RX ADMIN — LEVOTHYROXINE SODIUM 200 MCG: 100 TABLET ORAL at 10:18

## 2024-01-04 RX ADMIN — DEXMEDETOMIDINE HYDROCHLORIDE 0.6 MCG/KG/HR: 400 INJECTION, SOLUTION INTRAVENOUS at 17:54

## 2024-01-04 RX ADMIN — Medication 200 MCG/HR: at 01:11

## 2024-01-04 RX ADMIN — POTASSIUM CHLORIDE 20 MEQ: 29.8 INJECTION, SOLUTION INTRAVENOUS at 08:15

## 2024-01-04 RX ADMIN — SODIUM CHLORIDE, POTASSIUM CHLORIDE, SODIUM LACTATE AND CALCIUM CHLORIDE: 600; 310; 30; 20 INJECTION, SOLUTION INTRAVENOUS at 14:14

## 2024-01-04 RX ADMIN — MEROPENEM 1000 MG: 1 INJECTION, POWDER, FOR SOLUTION INTRAVENOUS at 13:04

## 2024-01-04 RX ADMIN — HEPARIN SODIUM 5000 UNITS: 5000 INJECTION INTRAVENOUS; SUBCUTANEOUS at 06:05

## 2024-01-04 RX ADMIN — ASPIRIN 81 MG 81 MG: 81 TABLET ORAL at 10:18

## 2024-01-04 RX ADMIN — MEROPENEM 1000 MG: 1 INJECTION, POWDER, FOR SOLUTION INTRAVENOUS at 05:00

## 2024-01-04 RX ADMIN — Medication 200 MCG/HR: at 19:04

## 2024-01-04 RX ADMIN — PANTOPRAZOLE SODIUM 40 MG: 40 INJECTION, POWDER, FOR SOLUTION INTRAVENOUS at 20:19

## 2024-01-04 RX ADMIN — SENNOSIDES 8.8 MG: 8.8 LIQUID ORAL at 12:42

## 2024-01-04 RX ADMIN — Medication 200 MCG/HR: at 06:08

## 2024-01-04 RX ADMIN — PANTOPRAZOLE SODIUM 40 MG: 40 INJECTION, POWDER, FOR SOLUTION INTRAVENOUS at 10:18

## 2024-01-04 RX ADMIN — ACETAMINOPHEN 1000 MG: 650 SOLUTION ORAL at 20:18

## 2024-01-04 RX ADMIN — Medication 15 MCG/MIN: at 05:15

## 2024-01-04 RX ADMIN — SODIUM CHLORIDE 3.3 UNITS/HR: 9 INJECTION, SOLUTION INTRAVENOUS at 01:03

## 2024-01-04 RX ADMIN — MAGNESIUM SULFATE HEPTAHYDRATE 1000 MG: 1 INJECTION, SOLUTION INTRAVENOUS at 09:30

## 2024-01-04 RX ADMIN — DEXMEDETOMIDINE HYDROCHLORIDE 0.7 MCG/KG/HR: 400 INJECTION, SOLUTION INTRAVENOUS at 22:14

## 2024-01-04 RX ADMIN — LINEZOLID 600 MG: 600 INJECTION, SOLUTION INTRAVENOUS at 22:16

## 2024-01-04 RX ADMIN — AMIODARONE HYDROCHLORIDE 0.5 MG/MIN: 50 INJECTION, SOLUTION INTRAVENOUS at 22:08

## 2024-01-04 RX ADMIN — VASOPRESSIN 0.03 UNITS/MIN: 20 INJECTION, SOLUTION INTRAVENOUS at 03:15

## 2024-01-04 RX ADMIN — MEROPENEM 1000 MG: 1 INJECTION, POWDER, FOR SOLUTION INTRAVENOUS at 20:21

## 2024-01-04 RX ADMIN — MICONAZOLE NITRATE: 20 CREAM TOPICAL at 20:19

## 2024-01-04 RX ADMIN — HEPARIN SODIUM 5000 UNITS: 5000 INJECTION INTRAVENOUS; SUBCUTANEOUS at 14:24

## 2024-01-04 RX ADMIN — MICONAZOLE NITRATE: 20 CREAM TOPICAL at 09:04

## 2024-01-04 RX ADMIN — LINEZOLID 600 MG: 600 INJECTION, SOLUTION INTRAVENOUS at 11:24

## 2024-01-04 RX ADMIN — DEXMEDETOMIDINE HYDROCHLORIDE 0.6 MCG/KG/HR: 400 INJECTION, SOLUTION INTRAVENOUS at 06:06

## 2024-01-04 RX ADMIN — VASOPRESSIN 0.03 UNITS/MIN: 20 INJECTION, SOLUTION INTRAVENOUS at 13:05

## 2024-01-04 RX ADMIN — DEXMEDETOMIDINE HYDROCHLORIDE 0.6 MCG/KG/HR: 400 INJECTION, SOLUTION INTRAVENOUS at 09:34

## 2024-01-04 RX ADMIN — SENNOSIDES 8.8 MG: 8.8 LIQUID ORAL at 20:18

## 2024-01-04 RX ADMIN — HEPARIN SODIUM 5000 UNITS: 5000 INJECTION INTRAVENOUS; SUBCUTANEOUS at 22:08

## 2024-01-04 RX ADMIN — DEXMEDETOMIDINE HYDROCHLORIDE 0.6 MCG/KG/HR: 400 INJECTION, SOLUTION INTRAVENOUS at 13:36

## 2024-01-04 RX ADMIN — ALBUMIN (HUMAN) 25 G: 0.25 INJECTION, SOLUTION INTRAVENOUS at 01:23

## 2024-01-04 RX ADMIN — SODIUM CHLORIDE, POTASSIUM CHLORIDE, SODIUM LACTATE AND CALCIUM CHLORIDE: 600; 310; 30; 20 INJECTION, SOLUTION INTRAVENOUS at 23:08

## 2024-01-04 RX ADMIN — DEXMEDETOMIDINE HYDROCHLORIDE 0.6 MCG/KG/HR: 400 INJECTION, SOLUTION INTRAVENOUS at 01:07

## 2024-01-04 RX ADMIN — DOCUSATE SODIUM LIQUID 100 MG: 100 LIQUID ORAL at 12:42

## 2024-01-04 RX ADMIN — DOCUSATE SODIUM LIQUID 100 MG: 100 LIQUID ORAL at 20:19

## 2024-01-04 ASSESSMENT — PULMONARY FUNCTION TESTS
PIF_VALUE: 26
PIF_VALUE: 26
PIF_VALUE: 28
PIF_VALUE: 25
PIF_VALUE: 28
PIF_VALUE: 27
PIF_VALUE: 25
PIF_VALUE: 26
PIF_VALUE: 25
PIF_VALUE: 23
PIF_VALUE: 27
PIF_VALUE: 26
PIF_VALUE: 26
PIF_VALUE: 32
PIF_VALUE: 27
PIF_VALUE: 26

## 2024-01-04 NOTE — PROGRESS NOTES
[facility] -   Infectious diseases evaluation  admission date 1/1/2024    reason for consultation:   Necrotizing Faciitis    Impression :   Current:  1/1/24 Necrotizing faciitis 2/2 Polymicrobial infection with T2DM and PEG tube dislodgement and infected   LEONARDA on CKD   1/2/24 S/p partial wedge gastrectomy due to infected PEG tube and dislodgement   1/2/24 S/p debridement of abdomen and indwelling mesh removal,  due to concern for necrotizing fasciitis, wound vac in place  1/3/24 GASTROSTOMY TUBE PLACEMENT, REPAIR OF LARGE VENTRAL DEFECT   Alcoholic Liver cirrhosis - found intra op  Elevated CRP  Lactic acidosis- initial 4.1 now 6.2  Bandemia leukemoid reaction- WBC 15 to 38 to 27 -45  Proteus UTI  RLL pseudomonas pneumonia     Other:    Discussion / summary of stay / plan of care   Watch plts  Consider dapto if plts drop  Defer vanco due to renal issues  Recommendations     Respiratory culture: pseudomonas multiS  U cx proteus multiS  Bcx pending - SCN x 1  is a contamination  MRSA swab and COVID pending   No abd wound cx taken  Leukemoid reaction persistent    Keep meropenem and zyvox -   Plts stable      Infection Control Recommendations   [place] Precautions  Contact Isolation       Antimicrobial Stewardship Recommendations   Simplification of therapy  Targeted therapy    History of Present Illness:   Initial history:  Ruben Dimas is a 58 y.o.-year-old male presents from extended-care facility via EMS for complaint of chest pain/abdominal pain.  Patient has a history of type 2 diabetes melitis, hypertension, hyperlipidemia, hypothyroidism, heart failure with preserved ejection fraction, PEG tube, liver cirrhosis, chronic trach secondary to chronic respiratory failure, A-fib, chronic urinary cath, obesity and bilateral lower extremity lymphedema. Recent hospitalization in December 2023.   On 1/1/2023 patient was brought by EMS where he was given 10 mg of Cardizem

## 2024-01-04 NOTE — PROGRESS NOTES
[facility]   Progress Note                   Date:   1/4/2024  Patient name: Ruben Dimas  Date of admission:  1/1/2024  2:37 PM  MRN:   [id number]  YOB: 1965  PCP: Ramy Lazo MD    Reason for Admission: sepsis    Subjective:       Seen and examined. Events yesterday noted. In afib this am, rate controlled. On 2 vasopressors. On fentanyl and precedex.     Medications:   Scheduled Meds:   fentanNYL  50 mcg IntraVENous Once    lactated ringers bolus  1,000 mL IntraVENous Once    linezolid  600 mg IntraVENous Q12H    aspirin  81 mg Per NG tube Daily    pantoprazole (PROTONIX) 40 mg in sodium chloride (PF) 0.9 % 10 mL injection  40 mg IntraVENous Q12H    sodium chloride flush  5-40 mL IntraVENous 2 times per day    levothyroxine  200 mcg Per G Tube Daily    meropenem  1,000 mg IntraVENous Q8H    heparin (porcine)  5,000 Units SubCUTAneous 3 times per day    miconazole   Topical BID     Continuous Infusions:   insulin 4.35 Units/hr (01/04/24 0700)    vasopressin 20 Units in sodium chloride 0.9 % 100 mL infusion 0.03 Units/min (01/04/24 0654)    lactated ringers IV soln 50 mL/hr at 01/04/24 0654    fentaNYL 50 mcg/mL 200 mcg/hr (01/04/24 0654)    dextrose      propofol Stopped (01/02/24 1356)    dexmedeTOMIDine 0.6 mcg/kg/hr (01/04/24 0654)    sodium chloride 10 mL/hr at 01/04/24 0654    norepinephrine 12 mcg/min (01/04/24 0654)    amiodarone 1 mg/min (01/04/24 0654)     CBC:   Recent Labs     01/03/24  0540 01/03/24  1135 01/03/24 2157 01/04/24  0430   WBC 25.3*  --  45.4* 43.3*   HGB 9.8* 9.9* 10.6* 9.5*   *  --  153 141       BMP:    Recent Labs     01/03/24  0540 01/03/24  1135 01/03/24  2157 01/04/24  0430    132* 135 135   K 4.1 3.4* 4.5 3.8   CL 99  --  100 99   CO2 23  --  22 23   BUN 47*  --  44* 43*   CREATININE 1.5*  --  1.4* 1.6*   GLUCOSE 266*  --  189* 216*       Hepatic:   Recent Labs     01/02/24  1821 01/03/24  0540 01/04/24  0430   AST 81* 61* 38   ALT 38  35 24   BILITOT 5.3* 4.2* 3.6*   ALKPHOS 125 122 135*       Troponin: No results for input(s): \"TROPONINI\" in the last 72 hours.  BNP: No results for input(s): \"BNP\" in the last 72 hours.  Lipids: No results for input(s): \"CHOL\", \"HDL\" in the last 72 hours.    Invalid input(s): \"LDLCALCU\"  INR:   Recent Labs     01/01/24  1500   INR 1.1         Objective:   Vitals: /61   Pulse 87   Temp 99.1 °F (37.3 °C)   Resp 14   Ht 1.778 m (5' 10\")   Wt (!) 161.7 kg (356 lb 6.4 oz)   SpO2 100%   BMI 51.14 kg/m²   General appearance: intubated  HEENT: Head: Normocephalic, no lesions, without obvious abnormality.  Neck: no adenopathy, no carotid bruit, no JVD, supple, symmetrical, trachea midline and thyroid not enlarged, symmetric, no tenderness/mass/nodules  Lungs: clear to auscultation bilaterally. intubated  Heart: irregular rate and rhythm, S1, S2 normal, no murmur, click, rub or gallop  Abdomen: soft, non-tender; bowel sounds normal; no masses,  no organomegaly  Extremities: extremities normal, atraumatic, no cyanosis or edema  Neurologic: Mental status:sedated    DATA:    Diagnostics:      EKG:   Atrial fibrillation with rapid ventricular response  Right bundle branch block  Left anterior fascicular block     ECHO:   Limited echo 1/2/24    Left Ventricle: Preserved left ventricular systolic function. EF by visual approximation is 50%. Unable to assess wall motion.    Right Ventricle: Not assessed due to poor image quality.    Image quality is poor. walls not well visualized. consider definity.    11/23 TTE    Left Ventricle: Normal left ventricular systolic function with a visually estimated EF of 55 - 60%. Left ventricle size is normal. Mildly increased wall thickness. Normal wall motion. Diastolic function present with increased LAP with normal LV EF.    Right Ventricle: Right ventricle is mildly dilated. Reduced systolic function.    Aortic Valve: Trileaflet valve. Mildly thickened cusp. Moderately calcified

## 2024-01-04 NOTE — CARE COORDINATION
Unable to preform CM assessment. Pt intubated. No family at bedside. Will attempt later as time allows. VM left with pt's son Richi requesting CB.

## 2024-01-04 NOTE — DISCHARGE INSTR - COC
Continuity of Care Form    Patient Name: Ruben Dimas   :  1965  MRN:  [id number]    Admit date:  2024  Discharge date:  ***    Code Status Order: Full Code   Advance Directives:     Admitting Physician:  Stephanie Joseph MD  PCP: Ramy Lazo MD    Discharging Nurse: ***  Discharging Hospital Unit/Room#: 1014/1014-01  Discharging Unit Phone Number: ***    Emergency Contact:   Extended Emergency Contact Information  Primary Emergency Contact: Richi Dimas   [facility]  Home Phone: [phone]  Work Phone: [phone]  Mobile Phone: [phone]  Relation: Child  Secondary Emergency Contact: Igor Dimas   [facility]  Home Phone: [phone]  Relation: Child    Past Surgical History:  Past Surgical History:   Procedure Laterality Date    ABDOMEN SURGERY      hernia repair x4 (ventral)    COLONOSCOPY          COLONOSCOPY N/A 2023    COLONOSCOPY DIAGNOSTIC performed by Dorian Rendon MD at [facility] OR    COLONOSCOPY  2023    COLONOSCOPY N/A 2023    COLONOSCOPY DIAGNOSTIC performed by Dorian Rendon MD at [facility] OR    CYSTOSCOPY  2018    CYSTOSCOPY  2019    CYSTOSCOPY N/A 2019    CYSTOSCOPY DILATION performed by Fazal Guallpa MD at [facility] OR    CYSTOSCOPY N/A 2019    CYSTOSCOPY/ DILATION NICOLE CATHETER EXCHANGE performed by Fazal Guallpa MD at [facility] OR    CYSTOSCOPY N/A 2022    CYSTOSCOPY, REMOVAL OF SMALL BLADDER STONE AND BLADDER IRRIGATION performed by Fazal Guallpa MD at [facility] OR    ENDOSCOPY, COLON, DIAGNOSTIC      HERNIA REPAIR      INCISIONAL HERNIA REPAIR  2018    repair and reduction of recurrent incarcerated incisional hernia with mesh    LAPAROTOMY N/A 2024    LAPAROTOMY EXPLORATORY, DEBRIDEMENT OF SUBQ TISSUE INCLUDING FASCIA 50X44, APPLICATION OF WOUND VAC >50 SQ CM, PARTIAL GASTRECTOMY, EXTENSIVE LYSIS OF ADHESIONS performed by Escobar Ulloa MD at [facility] OR    TN CYSTOURETHROSCOPY N/A 2018    CYSTOSCOPY

## 2024-01-04 NOTE — CARE COORDINATION
Case Management Assessment  Initial Evaluation    Date/Time of Evaluation: 1/4/2024 3:38 PM  Assessment Completed by: MATHEW PERKINS RN    If patient is discharged prior to next notation, then this note serves as note for discharge by case management.    Patient Name: Ruben Dimas                   YOB: 1965  Diagnosis: Atrial fibrillation with RVR (HCC) [I48.91]  Septic shock (HCC) [A41.9, R65.21]                   Date / Time: 1/1/2024  2:37 PM    Patient Admission Status: Inpatient   Readmission Risk (Low < 19, Mod (19-27), High > 27): Readmission Risk Score: 32.7    Current PCP: Ramy Lazo MD  PCP verified by CM? (P) Yes    Chart Reviewed: Yes      History Provided by: (P) Child/Family (son Richi Dimas)  Patient Orientation: (P) Sedated    Patient Cognition: (P) Other (see comment) (Vent)    Hospitalization in the last 30 days (Readmission):  No    If yes, Readmission Assessment in  Navigator will be completed.    Advance Directives:      Code Status: Full Code   Patient's Primary Decision Maker is: (P) Legal Next of Kin    Primary Decision Maker (Active): Richi Dimas - Child - [phone]    Primary Decision Maker: Igor Dimas - Child - [phone]    Discharge Planning:    Patient lives with: (P) Children Type of Home: (P) House  Primary Care Giver: (P) Other (Comment) (aid)  Patient Support Systems include: (P) Children   Current Financial resources: (P) Medicare, Medicaid  Current community resources:    Current services prior to admission: (P) Skilled Nursing Facility, Durable Medical Equipment            Current DME: (P) Shower Chair, Wheelchair, Oxygen Therapy (Comment), [place] (Son does not know supplier for O2)            Type of Home Care services:  (P) Aide Services    ADLS  Prior functional level: (P) Assistance with the following:, Bathing, Dressing, Cooking, Housework, Mobility  Current functional level: (P) Assistance with the following:, Bathing, Dressing, Toileting,  Feeding, Cooking, Housework, Shopping, Mobility    PT AM-PAC:   /24  OT AM-PAC:   /24    Family can provide assistance at DC: (P) Yes  Would you like Case Management to discuss the discharge plan with any other family members/significant others, and if so, who? (P) Yes (son Richi Dimas)  Plans to Return to Present Housing: (P) No  Other Identified Issues/Barriers to RETURNING to current housing: Septic Shock  Potential Assistance needed at discharge: (P) Skilled Nursing Facility            Potential DME:    Patient expects to discharge to: (P) Skilled nursing facility  Plan for transportation at discharge:      Financial    Payor: MEDICARE / Plan: MEDICARE PART A AND B / Product Type: *No Product type* /     Does insurance require precert for SNF: No    Potential assistance Purchasing Medications: (P) No  Meds-to-Beds request: Yes      [facility] - [place], [place] - [address] -  [phone] - F [phone] [address] [facility] [address] [facility] [address]  Phone: [phone] Fax: [phone]      Notes:    Factors facilitating achievement of predicted outcomes: Family support    Barriers to discharge: Limited family support and Medical complications    Additional Case Management Notes: Came from [facility] for rehab. Pt's son Richi Dimas  would  like him to return. Referral sent and call to [facility]. Acceptance will need to be verified thru their vent specialist Niki.     1630 Call from Niki from [facility]. States pt is a 30 day bed hold. To call her before discharge. [phone].     The Plan for Transition of Care is related to the following treatment goals of Atrial fibrillation with RVR (HCC) [I48.91]  Septic shock (HCC) [A41.9, R65.21]    IF APPLICABLE: The Patient and/or patient representative Ruben and his family were provided with a choice of provider and agrees with the discharge plan. Freedom of choice list with basic dialogue that supports the patient's individualized plan of

## 2024-01-04 NOTE — PROCEDURES
PROCEDURE NOTE - ARTERIAL LINE INSERTION    PATIENT NAME: Ruben Dimas  MEDICAL RECORD #: [id number]  DATE: 1/4/2024  SURGEON: Dr. Ruelas / Jeffrey Preciado DO  PREOPERATIVE DIAGNOSIS:  Need for invasive BP monitoring  POSTOPERATIVE DIAGNOSIS:  Same  PROCEDURE PERFORMED: Left radial arterial line placement  ESTIMATED BLOOD LOSS:  Less than 10 ml  COMPLICATIONS:  None immediately appreciated.  OPERATIVE NOTE PREPARED BY: Jeffrey Preciado DO    DISCUSSION:  Ruben Dimas, who requires  invasive arterial BP monitoring. The history and physical examination were reviewed and confirmed. Consent was implied as the patient required the procedure emergently. The patient was then prepared for the procedure.    PROCEDURE:  A timeout was initiated by the bedside nurse and was confirmed by those present.  The patient was placed in a supine position. The skin overlying the left radial artery was prepped with chlorhexadine and draped in sterile fashion. The introducer needle was inserted into the left radial artery returning pulsatile blood. A guidewire was placed through the center of the needle with no resistance. The catheter was inserted over the guide wire. The guidewire was then removed. The line was connected to the transducer. The catheter then secured using silk suture and a temporary sterile dressing was applied.  No immediate complication was evident.  All sponge, instrument and needle counts were correct at the completion of the procedure.     Jeffrey Preciado DO  1/4/2024  3:26 AM

## 2024-01-04 NOTE — PROGRESS NOTES
Pulse: 87   Resp: 14   Temp: 99.1 °F (37.3 °C)   SpO2: 100%       Physical Exam  Constitutional:       Appearance: He is ill-appearing and toxic-appearing.   HENT:      Head: Normocephalic.   Eyes:      Extraocular Movements: Extraocular movements intact.      Pupils: Pupils are equal, round, and reactive to light.   Cardiovascular:      Rate and Rhythm: Tachycardia present. Rhythm irregular.   Pulmonary:      Effort: No respiratory distress.      Breath sounds: No wheezing or rales.   Abdominal:      Palpations: Abdomen is soft.      Comments: Open abdominal wall with overlying wound vac, mild surrounding erythema predominently on the Lt inferio lateral side, ALBIN x2, G tube in place to gravity         Drain/tube output:   Output by Drain (mL) 01/02/24 0701 - 01/02/24 1900 01/02/24 1901 - 01/03/24 0700 01/03/24 0701 - 01/03/24 1900 01/03/24 1901 - 01/04/24 0700 01/04/24 0701 - 01/04/24 0803   Closed/Suction Drain Lateral;Superior RUQ Bulb   105 230    Closed/Suction Drain Lateral;Inferior RUQ Bulb   105 180    Negative Pressure Wound Therapy Abdomen Medial;Upper 1150 1200 300 950        LAB:  CBC:   Recent Labs     01/03/24  0540 01/03/24  1135 01/03/24 2157 01/04/24  0430   WBC 25.3*  --  45.4* 43.3*   HGB 9.8* 9.9* 10.6* 9.5*   HCT 29.2* 30.7* 33.6* 28.8*   MCV 87.2  --  92.1 88.9   *  --  153 141       BMP:   Recent Labs     01/03/24  0540 01/03/24  1135 01/03/24 2157 01/04/24  0430    132* 135 135   K 4.1 3.4* 4.5 3.8   CL 99  --  100 99   CO2 23  --  22 23   BUN 47*  --  44* 43*   CREATININE 1.5*  --  1.4* 1.6*   GLUCOSE 266*  --  189* 216*           RADIOLOGY:  Echo (TTE) limited (PRN contrast/bubble/strain/3D)    Result Date: 1/2/2024    Left Ventricle: Preserved left ventricular systolic function. EF by visual approximation is 50%. Unable to assess wall motion.   Right Ventricle: Not assessed due to poor image quality.   Image quality is poor. walls not well visualized. consider definity.      XR ABDOMEN FOR NG/OG/NE TUBE PLACEMENT    Result Date: 1/2/2024  EXAMINATION: ONE SUPINE X-RAY VIEW(S) OF THE ABDOMEN 1/2/2024 8:12 am COMPARISON: 11/03/2023 HISTORY: ORDERING SYSTEM PROVIDED HISTORY:  Confirmation of course of NG/OG/NE tube and location of tip of tube. TECHNOLOGIST PROVIDED HISTORY: Confirmation of course of NG/OG/NE tube and location of tip of tube. Portable?  Yes FINDINGS: Enteric tube tip projects in the expected location of the stomach, with side-port just below the GE junction.  There is likely a gastrostomy tube. Nonspecific bowel gas pattern.  Surgical clips are noted.  Probable right basilar airspace opacities.     Enteric tube tip projects in the expected location of the stomach, with side-port just below the GE junction.        Reshma Yao,   General Surgery Resident PGY-1  1/4/2024, 7:39 AM

## 2024-01-04 NOTE — ANESTHESIA PRE PROCEDURE
Department of Anesthesiology  Preprocedure Note       Name:  Ruben Dimas   Age:  58 y.o.  :  1965                                          MRN:  [id number]         Date:  2024      Surgeon: Surgeon(s):  Stephanie Joseph MD    Procedure: Procedure(s):  WOUND VAC CHANGE, POSSIBLE DEBRIDEMENT ABDOMEN (MISONIX)    Medications prior to admission:   Prior to Admission medications    Medication Sig Start Date End Date Taking? Authorizing Provider   clonazePAM (KLONOPIN) 1 MG tablet 1 tablet by Orogastric route in the morning and 1 tablet in the evening. Do all this for 14 days. Max Daily Amount: 2 mg. 11/10/23 11/24/23  Zakia Sanchez MD   docusate (COLACE) 50 MG/5ML liquid 10 mLs by Per G Tube route 2 times daily 23   Zakia Sanchez MD   levothyroxine (SYNTHROID) 200 MCG tablet 1 tablet by Per G Tube route Daily 11/10/23   Zakia Sanchez MD   magnesium oxide (MAG-OX) 400 (240 Mg) MG tablet 1 tablet by PEG Tube route daily 23   Zakia Sanchez MD   ferrous sulfate (FE TABS 325) 325 (65 Fe) MG EC tablet Take 1 tablet by mouth daily (with breakfast) 23   Zakia Sanchez MD   midodrine (PROAMATINE) 5 MG tablet 3 tablets by Orogastric route every 6 hours 23   Zakia Sanchez MD   traZODone (DESYREL) 50 MG tablet 1.5 tablets by Per G Tube route nightly 23  Zakia Sanchez MD   albuterol (PROVENTIL) (2.5 MG/3ML) 0.083% nebulizer solution Take 3 mLs by nebulization every 4 hours as needed for Wheezing or Shortness of Breath 23   Zakia Sanchez MD   citalopram (CELEXA) 10 MG tablet 3 tablets by Orogastric route daily 11/10/23   Zakia Sanchez MD   famotidine (PEPCID) 20 MG tablet 1 tablet by Per G Tube route 2 times daily 23   Zakia Sanchez MD   bumetanide (BUMEX) 1 MG tablet 1 tablet by PEG Tube route 2 times daily 23   Zakia Sanchez MD   amiodarone (CORDARONE) 200 MG tablet 1 tablet by PEG Tube route 2 times daily 11/9/23 3/8/24  Zakia Sanchez MD

## 2024-01-04 NOTE — PLAN OF CARE
Problem: Discharge Planning  Goal: Discharge to home or other facility with appropriate resources  1/4/2024 0323 by Adrienne Contreras RN  Outcome: Progressing  1/3/2024 1643 by David Talley RN  Outcome: Progressing     Problem: Pain  Goal: Verbalizes/displays adequate comfort level or baseline comfort level  1/4/2024 0323 by Adrienne Contreras RN  Outcome: Progressing  1/3/2024 1643 by David Talley RN  Outcome: Progressing     Problem: Safety - Adult  Goal: Free from fall injury  1/4/2024 0323 by Adrienne Contreras RN  Outcome: Progressing  1/3/2024 1643 by David Talley RN  Outcome: Progressing     Problem: Respiratory - Adult  Goal: Achieves optimal ventilation and oxygenation  1/4/2024 0323 by Adrienne Contreras RN  Outcome: Progressing  1/3/2024 1643 by David Talley RN  Outcome: Progressing     Problem: Chronic Conditions and Co-morbidities  Goal: Patient's chronic conditions and co-morbidity symptoms are monitored and maintained or improved  1/4/2024 0323 by Adrienne Contreras RN  Outcome: Progressing  1/3/2024 1643 by David Talley RN  Outcome: Progressing     Problem: Skin/Tissue Integrity  Goal: Absence of new skin breakdown  Description: 1.  Monitor for areas of redness and/or skin breakdown  2.  Assess vascular access sites hourly  3.  Every 4-6 hours minimum:  Change oxygen saturation probe site  4.  Every 4-6 hours:  If on nasal continuous positive airway pressure, respiratory therapy assess nares and determine need for appliance change or resting period.  1/4/2024 0323 by Adrienne Contreras RN  Outcome: Progressing  1/3/2024 1643 by David Talley RN  Outcome: Progressing     Problem: ABCDS Injury Assessment  Goal: Absence of physical injury  1/4/2024 0323 by Adrienne Contreras RN  Outcome: Progressing  1/3/2024 1643 by David Talley RN  Outcome: Progressing     Problem: Nutrition Deficit:  Goal: Optimize nutritional status  1/4/2024 0323 by Adrienne Contreras RN  Outcome: Progressing  1/3/2024

## 2024-01-04 NOTE — ANESTHESIA PRE PROCEDURE
Department of Anesthesiology  Preprocedure Note       Name:  Ruben Dimas   Age:  58 y.o.  :  1965                                          MRN:  [id number]         Date:  2024      Surgeon: Surgeon(s):  Stephanie Joseph MD    Procedure: Procedure(s):  WOUND VAC CHANGE, POSSIBLE DEBRIDEMENT ABDOMEN (MISONIX)    Medications prior to admission:   Prior to Admission medications    Medication Sig Start Date End Date Taking? Authorizing Provider   clonazePAM (KLONOPIN) 1 MG tablet 1 tablet by Orogastric route in the morning and 1 tablet in the evening. Do all this for 14 days. Max Daily Amount: 2 mg. 11/10/23 11/24/23  Zakia Sanchez MD   docusate (COLACE) 50 MG/5ML liquid 10 mLs by Per G Tube route 2 times daily 23   Zakia Sanchez MD   levothyroxine (SYNTHROID) 200 MCG tablet 1 tablet by Per G Tube route Daily 11/10/23   Zakia Sanchez MD   magnesium oxide (MAG-OX) 400 (240 Mg) MG tablet 1 tablet by PEG Tube route daily 23   Zakia Sanchez MD   ferrous sulfate (FE TABS 325) 325 (65 Fe) MG EC tablet Take 1 tablet by mouth daily (with breakfast) 23   Zakia Sanchez MD   midodrine (PROAMATINE) 5 MG tablet 3 tablets by Orogastric route every 6 hours 23   Zakia Sanchez MD   traZODone (DESYREL) 50 MG tablet 1.5 tablets by Per G Tube route nightly 23  Zakia Sanchez MD   albuterol (PROVENTIL) (2.5 MG/3ML) 0.083% nebulizer solution Take 3 mLs by nebulization every 4 hours as needed for Wheezing or Shortness of Breath 23   Zakia Sanchez MD   citalopram (CELEXA) 10 MG tablet 3 tablets by Orogastric route daily 11/10/23   Zakia Sanchez MD   famotidine (PEPCID) 20 MG tablet 1 tablet by Per G Tube route 2 times daily 23   Zakia Sanchez MD   bumetanide (BUMEX) 1 MG tablet 1 tablet by PEG Tube route 2 times daily 23   Zakia Sanchez MD   amiodarone (CORDARONE) 200 MG tablet 1 tablet by PEG Tube route 2 times daily 11/9/23 3/8/24  Zakia Sanchez MD

## 2024-01-04 NOTE — PLAN OF CARE
oxygenation:   Assess for changes in respiratory status   Assess for changes in mentation and behavior   Position to facilitate oxygenation and minimize respiratory effort   Oxygen supplementation based on oxygen saturation or arterial blood gases   Encourage broncho-pulmonary hygiene including cough, deep breathe, incentive spirometry   Assess the need for suctioning and aspirate as needed   Respiratory therapy support as indicated  1/4/2024 0323 by Adrienne Contreras RN  Outcome: Progressing     Problem: Chronic Conditions and Co-morbidities  Goal: Patient's chronic conditions and co-morbidity symptoms are monitored and maintained or improved  1/4/2024 0732 by Milligan, Matthew, RN  Outcome: Progressing  Flowsheets (Taken 1/4/2024 0732)  Care Plan - Patient's Chronic Conditions and Co-Morbidity Symptoms are Monitored and Maintained or Improved:   Monitor and assess patient's chronic conditions and comorbid symptoms for stability, deterioration, or improvement   Collaborate with multidisciplinary team to address chronic and comorbid conditions and prevent exacerbation or deterioration   Update acute care plan with appropriate goals if chronic or comorbid symptoms are exacerbated and prevent overall improvement and discharge  1/4/2024 0323 by Adrienne Contreras RN  Outcome: Progressing     Problem: Skin/Tissue Integrity  Goal: Absence of new skin breakdown  Description: 1.  Monitor for areas of redness and/or skin breakdown  2.  Assess vascular access sites hourly  3.  Every 4-6 hours minimum:  Change oxygen saturation probe site  4.  Every 4-6 hours:  If on nasal continuous positive airway pressure, respiratory therapy assess nares and determine need for appliance change or resting period.  1/4/2024 0732 by Milligan, Matthew, RN  Outcome: Progressing  1/4/2024 0323 by Adrienne Contreras RN  Outcome: Progressing     Problem: ABCDS Injury Assessment  Goal: Absence of physical injury  1/4/2024 0732 by Milligan, Matthew,  RN  Outcome: Progressing  Flowsheets (Taken 1/4/2024 0732)  Absence of Physical Injury: Implement safety measures based on patient assessment  1/4/2024 0323 by Adrienne Contreras RN  Outcome: Progressing     Problem: Nutrition Deficit:  Goal: Optimize nutritional status  1/4/2024 0732 by Milligan, Matthew, RN  Outcome: Progressing  Flowsheets (Taken 1/4/2024 0732)  Nutrient intake appropriate for improving, restoring, or maintaining nutritional needs: Assess nutritional status and recommend course of action  1/4/2024 0323 by Adrienne Contreras, RN  Outcome: Progressing

## 2024-01-04 NOTE — PLAN OF CARE
Problem: Respiratory - Adult  Goal: Achieves optimal ventilation and oxygenation  1/4/2024 1107 by Zachariah Crook RCP  Outcome: Progressing

## 2024-01-05 ENCOUNTER — ANESTHESIA (OUTPATIENT)
Dept: OPERATING ROOM | Age: 59
End: 2024-01-05
Payer: MEDICARE

## 2024-01-05 ENCOUNTER — APPOINTMENT (OUTPATIENT)
Dept: GENERAL RADIOLOGY | Age: 59
DRG: 853 | End: 2024-01-05
Payer: MEDICARE

## 2024-01-05 LAB
ALBUMIN SERPL-MCNC: 2.3 G/DL (ref 3.5–5.2)
ALBUMIN/GLOB SERPL: 0.9 {RATIO} (ref 1–2.5)
ALP SERPL-CCNC: 160 U/L (ref 40–129)
ALT SERPL-CCNC: 22 U/L (ref 5–41)
ANION GAP SERPL CALCULATED.3IONS-SCNC: 15 MMOL/L (ref 9–17)
ANION GAP SERPL CALCULATED.3IONS-SCNC: 8 MMOL/L (ref 9–17)
AST SERPL-CCNC: 37 U/L
BASOPHILS # BLD: 0 K/UL (ref 0–0.2)
BASOPHILS # BLD: 0 K/UL (ref 0–0.2)
BASOPHILS NFR BLD: 0 % (ref 0–2)
BASOPHILS NFR BLD: 0 % (ref 0–2)
BILIRUB SERPL-MCNC: 3 MG/DL (ref 0.3–1.2)
BUN SERPL-MCNC: 33 MG/DL (ref 6–20)
BUN SERPL-MCNC: 37 MG/DL (ref 6–20)
CA-I BLD-SCNC: 1.11 MMOL/L (ref 1.13–1.33)
CA-I BLD-SCNC: 1.13 MMOL/L (ref 1.13–1.33)
CALCIUM SERPL-MCNC: 8 MG/DL (ref 8.6–10.4)
CALCIUM SERPL-MCNC: 8.2 MG/DL (ref 8.6–10.4)
CHLORIDE SERPL-SCNC: 100 MMOL/L (ref 98–107)
CHLORIDE SERPL-SCNC: 102 MMOL/L (ref 98–107)
CO2 SERPL-SCNC: 16 MMOL/L (ref 20–31)
CO2 SERPL-SCNC: 24 MMOL/L (ref 20–31)
CREAT SERPL-MCNC: 1.1 MG/DL (ref 0.7–1.2)
CREAT SERPL-MCNC: 1.2 MG/DL (ref 0.7–1.2)
EOSINOPHIL # BLD: 0 K/UL (ref 0–0.4)
EOSINOPHIL # BLD: 0.61 K/UL (ref 0–0.44)
EOSINOPHILS RELATIVE PERCENT: 0 % (ref 1–4)
EOSINOPHILS RELATIVE PERCENT: 3 % (ref 1–4)
ERYTHROCYTE [DISTWIDTH] IN BLOOD BY AUTOMATED COUNT: 18.1 % (ref 11.8–14.4)
ERYTHROCYTE [DISTWIDTH] IN BLOOD BY AUTOMATED COUNT: 18.2 % (ref 11.8–14.4)
FIO2: 30
GFR SERPL CREATININE-BSD FRML MDRD: >60 ML/MIN/1.73M2
GFR SERPL CREATININE-BSD FRML MDRD: >60 ML/MIN/1.73M2
GLUCOSE BLD-MCNC: 125 MG/DL (ref 75–110)
GLUCOSE BLD-MCNC: 135 MG/DL (ref 75–110)
GLUCOSE BLD-MCNC: 138 MG/DL (ref 74–100)
GLUCOSE BLD-MCNC: 140 MG/DL (ref 75–110)
GLUCOSE BLD-MCNC: 151 MG/DL (ref 75–110)
GLUCOSE BLD-MCNC: 154 MG/DL (ref 75–110)
GLUCOSE BLD-MCNC: 159 MG/DL (ref 75–110)
GLUCOSE BLD-MCNC: 162 MG/DL (ref 75–110)
GLUCOSE BLD-MCNC: 162 MG/DL (ref 75–110)
GLUCOSE BLD-MCNC: 165 MG/DL (ref 75–110)
GLUCOSE BLD-MCNC: 169 MG/DL (ref 75–110)
GLUCOSE BLD-MCNC: 171 MG/DL (ref 75–110)
GLUCOSE BLD-MCNC: 176 MG/DL (ref 75–110)
GLUCOSE BLD-MCNC: 183 MG/DL (ref 75–110)
GLUCOSE BLD-MCNC: 202 MG/DL (ref 75–110)
GLUCOSE BLD-MCNC: 226 MG/DL (ref 75–110)
GLUCOSE BLD-MCNC: 230 MG/DL (ref 75–110)
GLUCOSE BLD-MCNC: 237 MG/DL (ref 75–110)
GLUCOSE SERPL-MCNC: 144 MG/DL (ref 70–99)
GLUCOSE SERPL-MCNC: 254 MG/DL (ref 70–99)
HCT VFR BLD AUTO: 25.7 % (ref 40.7–50.3)
HCT VFR BLD AUTO: 26.1 % (ref 40.7–50.3)
HGB BLD-MCNC: 8.5 G/DL (ref 13–17)
HGB BLD-MCNC: 8.5 G/DL (ref 13–17)
IMM GRANULOCYTES # BLD AUTO: 0.16 K/UL (ref 0–0.3)
IMM GRANULOCYTES # BLD AUTO: 1.01 K/UL (ref 0–0.3)
IMM GRANULOCYTES NFR BLD: 1 %
IMM GRANULOCYTES NFR BLD: 5 %
LYMPHOCYTES NFR BLD: 0.94 K/UL (ref 1–4.8)
LYMPHOCYTES NFR BLD: 1.21 K/UL (ref 1.1–3.7)
LYMPHOCYTES RELATIVE PERCENT: 6 % (ref 24–43)
LYMPHOCYTES RELATIVE PERCENT: 6 % (ref 24–44)
MAGNESIUM SERPL-MCNC: 1.9 MG/DL (ref 1.6–2.6)
MAGNESIUM SERPL-MCNC: 1.9 MG/DL (ref 1.6–2.6)
MCH RBC QN AUTO: 28.8 PG (ref 25.2–33.5)
MCH RBC QN AUTO: 29.5 PG (ref 25.2–33.5)
MCHC RBC AUTO-ENTMCNC: 32.6 G/DL (ref 28.4–34.8)
MCHC RBC AUTO-ENTMCNC: 33.1 G/DL (ref 28.4–34.8)
MCV RBC AUTO: 88.5 FL (ref 82.6–102.9)
MCV RBC AUTO: 89.2 FL (ref 82.6–102.9)
MODE: ABNORMAL
MONOCYTES NFR BLD: 0.47 K/UL (ref 0.1–0.8)
MONOCYTES NFR BLD: 0.81 K/UL (ref 0.1–1.2)
MONOCYTES NFR BLD: 3 % (ref 1–7)
MONOCYTES NFR BLD: 4 % (ref 3–12)
MORPHOLOGY: ABNORMAL
NEGATIVE BASE EXCESS, ART: 0.3 MMOL/L (ref 0–2)
NEUTROPHILS NFR BLD: 82 % (ref 36–65)
NEUTROPHILS NFR BLD: 90 % (ref 36–66)
NEUTS SEG NFR BLD: 14.03 K/UL (ref 1.8–7.7)
NEUTS SEG NFR BLD: 16.56 K/UL (ref 1.5–8.1)
NRBC BLD-RTO: 0 PER 100 WBC
NRBC BLD-RTO: 0 PER 100 WBC
O2 DELIVERY DEVICE: ABNORMAL
PHOSPHATE SERPL-MCNC: 4 MG/DL (ref 2.5–4.5)
PHOSPHATE SERPL-MCNC: 5.1 MG/DL (ref 2.5–4.5)
PLATELET # BLD AUTO: 84 K/UL (ref 138–453)
PLATELET # BLD AUTO: 97 K/UL (ref 138–453)
PMV BLD AUTO: 9.8 FL (ref 8.1–13.5)
PMV BLD AUTO: 9.9 FL (ref 8.1–13.5)
POC HCO3: 23.4 MMOL/L (ref 21–28)
POC LACTIC ACID: 0.9 MMOL/L (ref 0.56–1.39)
POC O2 SATURATION: 98.5 % (ref 94–98)
POC PCO2: 33.2 MM HG (ref 35–48)
POC PH: 7.46 (ref 7.35–7.45)
POC PO2: 106.6 MM HG (ref 83–108)
POTASSIUM SERPL-SCNC: 3.3 MMOL/L (ref 3.7–5.3)
POTASSIUM SERPL-SCNC: 4.6 MMOL/L (ref 3.7–5.3)
PROT SERPL-MCNC: 5 G/DL (ref 6.4–8.3)
RBC # BLD AUTO: 2.88 M/UL (ref 4.21–5.77)
RBC # BLD AUTO: 2.95 M/UL (ref 4.21–5.77)
SAMPLE SITE: ABNORMAL
SODIUM SERPL-SCNC: 132 MMOL/L (ref 135–144)
SODIUM SERPL-SCNC: 133 MMOL/L (ref 135–144)
SURGICAL PATHOLOGY REPORT: NORMAL
TROPONIN I SERPL HS-MCNC: 30 NG/L (ref 0–22)
WBC OTHER # BLD: 15.6 K/UL (ref 3.5–11.3)
WBC OTHER # BLD: 20.2 K/UL (ref 3.5–11.3)

## 2024-01-05 PROCEDURE — A4648 IMPLANTABLE TISSUE MARKER: HCPCS | Performed by: SURGERY

## 2024-01-05 PROCEDURE — 2720000010 HC SURG SUPPLY STERILE: Performed by: SURGERY

## 2024-01-05 PROCEDURE — 84100 ASSAY OF PHOSPHORUS: CPT

## 2024-01-05 PROCEDURE — 0JB80ZZ EXCISION OF ABDOMEN SUBCUTANEOUS TISSUE AND FASCIA, OPEN APPROACH: ICD-10-PCS | Performed by: SURGERY

## 2024-01-05 PROCEDURE — 83605 ASSAY OF LACTIC ACID: CPT

## 2024-01-05 PROCEDURE — 15002 WOUND PREP TRK/ARM/LEG: CPT | Performed by: SURGERY

## 2024-01-05 PROCEDURE — 87176 TISSUE HOMOGENIZATION CULTR: CPT

## 2024-01-05 PROCEDURE — 87106 FUNGI IDENTIFICATION YEAST: CPT

## 2024-01-05 PROCEDURE — 99233 SBSQ HOSP IP/OBS HIGH 50: CPT | Performed by: INTERNAL MEDICINE

## 2024-01-05 PROCEDURE — 6360000002 HC RX W HCPCS: Performed by: NURSE PRACTITIONER

## 2024-01-05 PROCEDURE — 2500000003 HC RX 250 WO HCPCS

## 2024-01-05 PROCEDURE — 11042 DBRDMT SUBQ TIS 1ST 20SQCM/<: CPT | Performed by: SURGERY

## 2024-01-05 PROCEDURE — 80048 BASIC METABOLIC PNL TOTAL CA: CPT

## 2024-01-05 PROCEDURE — 2580000003 HC RX 258: Performed by: STUDENT IN AN ORGANIZED HEALTH CARE EDUCATION/TRAINING PROGRAM

## 2024-01-05 PROCEDURE — 3700000001 HC ADD 15 MINUTES (ANESTHESIA): Performed by: SURGERY

## 2024-01-05 PROCEDURE — 87206 SMEAR FLUORESCENT/ACID STAI: CPT

## 2024-01-05 PROCEDURE — 71045 X-RAY EXAM CHEST 1 VIEW: CPT

## 2024-01-05 PROCEDURE — 94003 VENT MGMT INPAT SUBQ DAY: CPT

## 2024-01-05 PROCEDURE — 97606 NEG PRS WND THER DME>50 SQCM: CPT | Performed by: SURGERY

## 2024-01-05 PROCEDURE — C9113 INJ PANTOPRAZOLE SODIUM, VIA: HCPCS | Performed by: NURSE PRACTITIONER

## 2024-01-05 PROCEDURE — 3600000004 HC SURGERY LEVEL 4 BASE: Performed by: SURGERY

## 2024-01-05 PROCEDURE — 6360000002 HC RX W HCPCS: Performed by: EMERGENCY MEDICINE

## 2024-01-05 PROCEDURE — 87077 CULTURE AEROBIC IDENTIFY: CPT

## 2024-01-05 PROCEDURE — 6360000002 HC RX W HCPCS: Performed by: STUDENT IN AN ORGANIZED HEALTH CARE EDUCATION/TRAINING PROGRAM

## 2024-01-05 PROCEDURE — C9113 INJ PANTOPRAZOLE SODIUM, VIA: HCPCS | Performed by: STUDENT IN AN ORGANIZED HEALTH CARE EDUCATION/TRAINING PROGRAM

## 2024-01-05 PROCEDURE — 2500000003 HC RX 250 WO HCPCS: Performed by: NURSE PRACTITIONER

## 2024-01-05 PROCEDURE — 2500000003 HC RX 250 WO HCPCS: Performed by: STUDENT IN AN ORGANIZED HEALTH CARE EDUCATION/TRAINING PROGRAM

## 2024-01-05 PROCEDURE — 2580000003 HC RX 258: Performed by: NURSE PRACTITIONER

## 2024-01-05 PROCEDURE — 82330 ASSAY OF CALCIUM: CPT

## 2024-01-05 PROCEDURE — A4216 STERILE WATER/SALINE, 10 ML: HCPCS | Performed by: NURSE PRACTITIONER

## 2024-01-05 PROCEDURE — 6360000002 HC RX W HCPCS

## 2024-01-05 PROCEDURE — 37799 UNLISTED PX VASCULAR SURGERY: CPT

## 2024-01-05 PROCEDURE — 87075 CULTR BACTERIA EXCEPT BLOOD: CPT

## 2024-01-05 PROCEDURE — 2700000000 HC OXYGEN THERAPY PER DAY

## 2024-01-05 PROCEDURE — 80053 COMPREHEN METABOLIC PANEL: CPT

## 2024-01-05 PROCEDURE — 2580000003 HC RX 258

## 2024-01-05 PROCEDURE — 0HR7XK3 REPLACEMENT OF ABDOMEN SKIN WITH NONAUTOLOGOUS TISSUE SUBSTITUTE, FULL THICKNESS, EXTERNAL APPROACH: ICD-10-PCS | Performed by: SURGERY

## 2024-01-05 PROCEDURE — 2060000000 HC ICU INTERMEDIATE R&B

## 2024-01-05 PROCEDURE — 6370000000 HC RX 637 (ALT 250 FOR IP): Performed by: NURSE PRACTITIONER

## 2024-01-05 PROCEDURE — 3600000014 HC SURGERY LEVEL 4 ADDTL 15MIN: Performed by: SURGERY

## 2024-01-05 PROCEDURE — 82803 BLOOD GASES ANY COMBINATION: CPT

## 2024-01-05 PROCEDURE — 6360000002 HC RX W HCPCS: Performed by: SURGERY

## 2024-01-05 PROCEDURE — 84484 ASSAY OF TROPONIN QUANT: CPT

## 2024-01-05 PROCEDURE — 15003 WOUND PREP ADDL 100 CM: CPT | Performed by: SURGERY

## 2024-01-05 PROCEDURE — P9041 ALBUMIN (HUMAN),5%, 50ML: HCPCS | Performed by: EMERGENCY MEDICINE

## 2024-01-05 PROCEDURE — 94761 N-INVAS EAR/PLS OXIMETRY MLT: CPT

## 2024-01-05 PROCEDURE — 82947 ASSAY GLUCOSE BLOOD QUANT: CPT

## 2024-01-05 PROCEDURE — 6370000000 HC RX 637 (ALT 250 FOR IP): Performed by: STUDENT IN AN ORGANIZED HEALTH CARE EDUCATION/TRAINING PROGRAM

## 2024-01-05 PROCEDURE — 2709999900 HC NON-CHARGEABLE SUPPLY: Performed by: SURGERY

## 2024-01-05 PROCEDURE — 85025 COMPLETE CBC W/AUTO DIFF WBC: CPT

## 2024-01-05 PROCEDURE — 3700000000 HC ANESTHESIA ATTENDED CARE: Performed by: SURGERY

## 2024-01-05 PROCEDURE — 2580000003 HC RX 258: Performed by: SURGERY

## 2024-01-05 PROCEDURE — 93005 ELECTROCARDIOGRAM TRACING: CPT

## 2024-01-05 PROCEDURE — 87205 SMEAR GRAM STAIN: CPT

## 2024-01-05 PROCEDURE — 11045 DBRDMT SUBQ TISS EACH ADDL: CPT | Performed by: SURGERY

## 2024-01-05 PROCEDURE — 87186 SC STD MICRODIL/AGAR DIL: CPT

## 2024-01-05 PROCEDURE — 87070 CULTURE OTHR SPECIMN AEROBIC: CPT

## 2024-01-05 PROCEDURE — 99291 CRITICAL CARE FIRST HOUR: CPT | Performed by: SURGERY

## 2024-01-05 PROCEDURE — 83735 ASSAY OF MAGNESIUM: CPT

## 2024-01-05 PROCEDURE — A4216 STERILE WATER/SALINE, 10 ML: HCPCS | Performed by: STUDENT IN AN ORGANIZED HEALTH CARE EDUCATION/TRAINING PROGRAM

## 2024-01-05 DEVICE — DRESSING WND MICRONIZED PARTIC 1000 MG MATRISTEM MICROMATRIX: Type: IMPLANTABLE DEVICE | Status: FUNCTIONAL

## 2024-01-05 RX ORDER — ALBUMIN, HUMAN INJ 5% 5 %
25 SOLUTION INTRAVENOUS ONCE
Status: COMPLETED | OUTPATIENT
Start: 2024-01-05 | End: 2024-01-05

## 2024-01-05 RX ORDER — ROCURONIUM BROMIDE 10 MG/ML
INJECTION, SOLUTION INTRAVENOUS PRN
Status: DISCONTINUED | OUTPATIENT
Start: 2024-01-05 | End: 2024-01-05 | Stop reason: SDUPTHER

## 2024-01-05 RX ORDER — CALCIUM GLUCONATE 20 MG/ML
1000 INJECTION, SOLUTION INTRAVENOUS ONCE
Status: COMPLETED | OUTPATIENT
Start: 2024-01-05 | End: 2024-01-06

## 2024-01-05 RX ORDER — SODIUM CHLORIDE, SODIUM LACTATE, POTASSIUM CHLORIDE, CALCIUM CHLORIDE 600; 310; 30; 20 MG/100ML; MG/100ML; MG/100ML; MG/100ML
INJECTION, SOLUTION INTRAVENOUS CONTINUOUS
Status: DISCONTINUED | OUTPATIENT
Start: 2024-01-05 | End: 2024-01-06

## 2024-01-05 RX ORDER — MIDAZOLAM HYDROCHLORIDE 1 MG/ML
INJECTION INTRAMUSCULAR; INTRAVENOUS PRN
Status: DISCONTINUED | OUTPATIENT
Start: 2024-01-05 | End: 2024-01-05 | Stop reason: SDUPTHER

## 2024-01-05 RX ORDER — MAGNESIUM SULFATE 1 G/100ML
1000 INJECTION INTRAVENOUS ONCE
Status: COMPLETED | OUTPATIENT
Start: 2024-01-05 | End: 2024-01-06

## 2024-01-05 RX ORDER — MAGNESIUM HYDROXIDE 1200 MG/15ML
LIQUID ORAL CONTINUOUS PRN
Status: COMPLETED | OUTPATIENT
Start: 2024-01-05 | End: 2024-01-05

## 2024-01-05 RX ORDER — POTASSIUM CHLORIDE 29.8 MG/ML
20 INJECTION INTRAVENOUS
Status: COMPLETED | OUTPATIENT
Start: 2024-01-05 | End: 2024-01-05

## 2024-01-05 RX ADMIN — MEROPENEM 1000 MG: 1 INJECTION, POWDER, FOR SOLUTION INTRAVENOUS at 21:50

## 2024-01-05 RX ADMIN — ROCURONIUM BROMIDE 50 MG: 10 INJECTION, SOLUTION INTRAVENOUS at 12:37

## 2024-01-05 RX ADMIN — PANTOPRAZOLE SODIUM 40 MG: 40 INJECTION, POWDER, FOR SOLUTION INTRAVENOUS at 22:04

## 2024-01-05 RX ADMIN — ROCURONIUM BROMIDE 50 MG: 10 INJECTION, SOLUTION INTRAVENOUS at 14:32

## 2024-01-05 RX ADMIN — LEVOTHYROXINE SODIUM 200 MCG: 100 TABLET ORAL at 08:29

## 2024-01-05 RX ADMIN — ACETAMINOPHEN 1000 MG: 650 SOLUTION ORAL at 22:04

## 2024-01-05 RX ADMIN — VASOPRESSIN 0.03 UNITS/MIN: 20 INJECTION, SOLUTION INTRAVENOUS at 03:13

## 2024-01-05 RX ADMIN — MICONAZOLE NITRATE: 20 CREAM TOPICAL at 22:27

## 2024-01-05 RX ADMIN — POTASSIUM CHLORIDE 20 MEQ: 29.8 INJECTION, SOLUTION INTRAVENOUS at 11:18

## 2024-01-05 RX ADMIN — SODIUM CHLORIDE, PRESERVATIVE FREE 10 ML: 5 INJECTION INTRAVENOUS at 21:50

## 2024-01-05 RX ADMIN — MEROPENEM 1000 MG: 1 INJECTION, POWDER, FOR SOLUTION INTRAVENOUS at 04:09

## 2024-01-05 RX ADMIN — Medication 50 MCG/HR: at 21:25

## 2024-01-05 RX ADMIN — POTASSIUM CHLORIDE 20 MEQ: 29.8 INJECTION, SOLUTION INTRAVENOUS at 08:24

## 2024-01-05 RX ADMIN — DEXMEDETOMIDINE HYDROCHLORIDE 0.7 MCG/KG/HR: 400 INJECTION, SOLUTION INTRAVENOUS at 09:44

## 2024-01-05 RX ADMIN — ACETAMINOPHEN 1000 MG: 650 SOLUTION ORAL at 17:10

## 2024-01-05 RX ADMIN — HEPARIN SODIUM 5000 UNITS: 5000 INJECTION INTRAVENOUS; SUBCUTANEOUS at 22:04

## 2024-01-05 RX ADMIN — SODIUM CHLORIDE, PRESERVATIVE FREE 10 ML: 5 INJECTION INTRAVENOUS at 10:14

## 2024-01-05 RX ADMIN — HEPARIN SODIUM 5000 UNITS: 5000 INJECTION INTRAVENOUS; SUBCUTANEOUS at 06:14

## 2024-01-05 RX ADMIN — SENNOSIDES 8.8 MG: 8.8 LIQUID ORAL at 22:04

## 2024-01-05 RX ADMIN — HEPARIN SODIUM 5000 UNITS: 5000 INJECTION INTRAVENOUS; SUBCUTANEOUS at 17:14

## 2024-01-05 RX ADMIN — MEROPENEM 1000 MG: 1 INJECTION, POWDER, FOR SOLUTION INTRAVENOUS at 17:09

## 2024-01-05 RX ADMIN — ALBUMIN (HUMAN) 25 G: 12.5 INJECTION, SOLUTION INTRAVENOUS at 12:10

## 2024-01-05 RX ADMIN — DEXMEDETOMIDINE HYDROCHLORIDE 0.7 MCG/KG/HR: 400 INJECTION, SOLUTION INTRAVENOUS at 17:18

## 2024-01-05 RX ADMIN — I.V. FAT EMULSION 250 ML: 20 EMULSION INTRAVENOUS at 17:46

## 2024-01-05 RX ADMIN — ACETAMINOPHEN 1000 MG: 650 SOLUTION ORAL at 06:14

## 2024-01-05 RX ADMIN — POTASSIUM CHLORIDE: 2 INJECTION, SOLUTION, CONCENTRATE INTRAVENOUS at 17:37

## 2024-01-05 RX ADMIN — ROCURONIUM BROMIDE 50 MG: 10 INJECTION, SOLUTION INTRAVENOUS at 13:46

## 2024-01-05 RX ADMIN — POTASSIUM CHLORIDE 20 MEQ: 29.8 INJECTION, SOLUTION INTRAVENOUS at 10:17

## 2024-01-05 RX ADMIN — SODIUM CHLORIDE, POTASSIUM CHLORIDE, SODIUM LACTATE AND CALCIUM CHLORIDE: 600; 310; 30; 20 INJECTION, SOLUTION INTRAVENOUS at 08:20

## 2024-01-05 RX ADMIN — LINEZOLID 600 MG: 600 INJECTION, SOLUTION INTRAVENOUS at 11:23

## 2024-01-05 RX ADMIN — DEXMEDETOMIDINE HYDROCHLORIDE 0.7 MCG/KG/HR: 400 INJECTION, SOLUTION INTRAVENOUS at 02:03

## 2024-01-05 RX ADMIN — MICONAZOLE NITRATE: 20 CREAM TOPICAL at 10:14

## 2024-01-05 RX ADMIN — DOCUSATE SODIUM LIQUID 100 MG: 100 LIQUID ORAL at 22:04

## 2024-01-05 RX ADMIN — LINEZOLID 600 MG: 600 INJECTION, SOLUTION INTRAVENOUS at 22:14

## 2024-01-05 RX ADMIN — DEXMEDETOMIDINE HYDROCHLORIDE 0.7 MCG/KG/HR: 400 INJECTION, SOLUTION INTRAVENOUS at 05:57

## 2024-01-05 RX ADMIN — MIDAZOLAM 2 MG: 1 INJECTION INTRAMUSCULAR; INTRAVENOUS at 12:32

## 2024-01-05 RX ADMIN — Medication 200 MCG/HR: at 08:23

## 2024-01-05 RX ADMIN — PANTOPRAZOLE SODIUM 40 MG: 40 INJECTION, POWDER, FOR SOLUTION INTRAVENOUS at 10:14

## 2024-01-05 ASSESSMENT — PULMONARY FUNCTION TESTS
PIF_VALUE: 25
PIF_VALUE: 26
PIF_VALUE: 27
PIF_VALUE: 25
PIF_VALUE: 24
PIF_VALUE: 25
PIF_VALUE: 24
PIF_VALUE: 26
PIF_VALUE: 25
PIF_VALUE: 26
PIF_VALUE: 25
PIF_VALUE: 25
PIF_VALUE: 26
PIF_VALUE: 26
PIF_VALUE: 25
PIF_VALUE: 26

## 2024-01-05 NOTE — PROGRESS NOTES
[facility]   Progress Note                   Date:   1/5/2024  Patient name: Ruben Dimas  Date of admission:  1/1/2024  2:37 PM  MRN:   [id number]  YOB: 1965  PCP: Ramy Lazo MD    Reason for Admission: sepsis    Subjective:       Examined at bedside.  Tracheostomy in place.  Patient on Levophed, vasopressin, amiodarone, fentanyl, Precedex.  Plan for washout in the OR today    Medications:   Scheduled Meds:   potassium chloride  20 mEq IntraVENous Q1H    acetaminophen  1,000 mg Oral 3 times per day    docusate  100 mg Oral BID    senna  5 mL Oral BID    linezolid  600 mg IntraVENous Q12H    aspirin  81 mg Per NG tube Daily    pantoprazole (PROTONIX) 40 mg in sodium chloride (PF) 0.9 % 10 mL injection  40 mg IntraVENous Q12H    sodium chloride flush  5-40 mL IntraVENous 2 times per day    levothyroxine  200 mcg Per G Tube Daily    meropenem  1,000 mg IntraVENous Q8H    heparin (porcine)  5,000 Units SubCUTAneous 3 times per day    miconazole   Topical BID     Continuous Infusions:   insulin 0.53 Units/hr (01/05/24 0708)    vasopressin 20 Units in sodium chloride 0.9 % 100 mL infusion 0.03 Units/min (01/05/24 0705)    lactated ringers IV soln 125 mL/hr at 01/05/24 0705    fentaNYL 50 mcg/mL 200 mcg/hr (01/05/24 0705)    dextrose      propofol Stopped (01/02/24 1356)    dexmedeTOMIDine 0.7 mcg/kg/hr (01/05/24 0705)    sodium chloride 10 mL/hr at 01/05/24 0705    norepinephrine 3 mcg/min (01/05/24 0705)    amiodarone 0.5 mg/min (01/05/24 0705)     CBC:   Recent Labs     01/03/24 2157 01/04/24 0430 01/05/24 0432   WBC 45.4* 43.3* 20.2*   HGB 10.6* 9.5* 8.5*    141 97*     BMP:    Recent Labs     01/03/24 2157 01/04/24 0430 01/05/24 0432    135 132*   K 4.5 3.8 3.3*    99 100   CO2 22 23 24   BUN 44* 43* 37*   CREATININE 1.4* 1.6* 1.2   GLUCOSE 189* 216* 144*     Hepatic:   Recent Labs     01/03/24  0540 01/04/24  0430 01/05/24  0432   AST 61* 38 37   ALT 35 24  22   BILITOT 4.2* 3.6* 3.0*   ALKPHOS 122 135* 160*     Troponin: No results for input(s): \"TROPONINI\" in the last 72 hours.  BNP: No results for input(s): \"BNP\" in the last 72 hours.  Lipids: No results for input(s): \"CHOL\", \"HDL\" in the last 72 hours.    Invalid input(s): \"LDLCALCU\"  INR:   No results for input(s): \"INR\" in the last 72 hours.      Objective:   Vitals: BP (!) 104/56   Pulse 65   Temp 98.6 °F (37 °C)   Resp 15   Ht 1.778 m (5' 10\")   Wt (!) 162.4 kg (358 lb 1.6 oz)   SpO2 100%   BMI 51.38 kg/m²   General appearance: Tracheostomy  HEENT: Head: Normocephalic, no lesions, without obvious abnormality.  Neck: no adenopathy, no carotid bruit, no JVD, supple, symmetrical, trachea midline and thyroid not enlarged, symmetric, no tenderness/mass/nodules  Lungs: clear to auscultation bilaterally. intubated  Heart: irregular rate and rhythm, S1, S2 normal, no murmur, click, rub or gallop  Abdomen: soft, non-tender; bowel sounds normal; no masses,  no organomegaly  Extremities: extremities normal, atraumatic, no cyanosis or edema  Neurologic: Mental status:sedated    DATA:    Diagnostics:      EKG:   Atrial fibrillation with rapid ventricular response  Right bundle branch block  Left anterior fascicular block     ECHO:   Limited echo 1/2/24    Left Ventricle: Preserved left ventricular systolic function. EF by visual approximation is 50%. Unable to assess wall motion.    Right Ventricle: Not assessed due to poor image quality.    Image quality is poor. walls not well visualized. consider definity.    11/23 TTE    Left Ventricle: Normal left ventricular systolic function with a visually estimated EF of 55 - 60%. Left ventricle size is normal. Mildly increased wall thickness. Normal wall motion. Diastolic function present with increased LAP with normal LV EF.    Right Ventricle: Right ventricle is mildly dilated. Reduced systolic function.    Aortic Valve: Trileaflet valve. Mildly thickened cusp. Moderately

## 2024-01-05 NOTE — PROGRESS NOTES
POST OP NOTE    SUBJECTIVE  Pt s/p wound vac replacement and irrigation and debridement of abdominal wall wound.      OBJECTIVE  VITALS:  BP (!) 104/56   Pulse 58   Temp 98.6 °F (37 °C) (Bladder)   Resp 17   Ht 1.778 m (5' 10\")   Wt (!) 162.4 kg (358 lb 1.6 oz)   SpO2 98%   BMI 51.38 kg/m²         GENERAL:  fatigued and somnolent.  no apparent distress  CARDIOVASCULAR:  regular rate and rhythm   LUNGS:  CTA Bilaterally  ABDOMEN:   Abdomen soft, wound vac in place.  INCISION: Wound vac in place    ASSESSMENT  1. POD# 0 s/p  wound vac replacement and irrigation and debridement of abdominal wall wound    PLAN  1. Pain management-tylenol. Fentanyl gtt  2. DVT proph-heparin SQ  3. GI proph-protonix  4. Post op labs pending      Domenica Horton DO  Trauma/Surgery Service  1/5/2024 at 6:05 PM

## 2024-01-05 NOTE — PROGRESS NOTES
Comprehensive Nutrition Assessment    Type and Reason for Visit:  Consult (TPN)    Nutrition Recommendations/Plan:   Start TPN, custom bag with 150 g dextrose, 50 g AA. Will start 250 mL 20% IL daily.        Malnutrition Assessment:  Malnutrition Status:  Insufficient data (01/03/24 1416)    Context:  Acute Illness     Findings of the 6 clinical characteristics of malnutrition:  Energy Intake:  Mild decrease in energy intake (Comment)  Weight Loss:  Greater than 7.5% over 3 months     Body Fat Loss:  Unable to assess     Muscle Mass Loss:  Unable to assess    Fluid Accumulation:  Moderate to Severe Generalized   Strength:  Not Performed    Nutrition Assessment:    Consulted for TPN, central line in place. Noted plan for wound vac change w/ abdominal washout today. Pt +trach, no propofol. +NGT to suction. No BM, hypoactive bowel sounds. +2 pitting generalized/BLE, +2 BUE edema noted. Labs reviewed: K+ 3.3 mmol/L, Mg 1.9 mg/dL, Phos 4.0 mg/dL. Wt fluctuation noted, will monitor.    Nutrition Related Findings:    labs/meds reviewed Wound Type: Surgical Incision, Wound Vac (to abdomen)       Current Nutrition Intake & Therapies:    Average Meal Intake: NPO  Average Supplements Intake: NPO  Diet NPO Exceptions are: Sips of Water with Meds    Anthropometric Measures:  Height: 177.8 cm (5' 10\")  Ideal Body Weight (IBW): 166 lbs (75 kg)    Admission Body Weight: 156.9 kg (345 lb 14.4 oz)  Current Body Weight: 162.4 kg (358 lb 0.4 oz) (1/5/24), 215.7 % IBW. Weight Source: Bed Scale  Current BMI (kg/m2): 51.4  Usual Body Weight: 192 kg (423 lb 5 oz) (10/19/23 bed scale per chart review)  % Weight Change (Calculated): -17.6  Weight Adjustment For: No Adjustment                 BMI Categories: Obese Class 3 (BMI 40.0 or greater)    Estimated Daily Nutrient Needs:  Energy Requirements Based On: Kcal/kg  Weight Used for Energy Requirements: Admission  Energy (kcal/day): 5065-2610 kcals/day  Weight Used for Protein

## 2024-01-05 NOTE — ANESTHESIA POSTPROCEDURE EVALUATION
Department of Anesthesiology  Postprocedure Note    Patient: Ruben Dimas  MRN: [id number]  YOB: 1965  Date of evaluation: 1/5/2024    Procedure Summary     Date: 01/05/24 Room / Location: [address]    Anesthesia Start: 1232 Anesthesia Stop: 1528    Procedure: PREPARATION OF WOUND BED, PLACEMENT OF SKIN SUBSTITUTE, WOUND VAC PLACEMENT, IRRIGATION AND DEBRIDEMENT OF RIGHT LOWER ABDOMINAL WOUND Diagnosis:       Necrotizing fasciitis (HCC)      (Necrotizing fasciitis (HCC) [M72.6])    Surgeons: Stephanie Joseph MD Responsible Provider: Moreno Jara MD    Anesthesia Type: general ASA Status: 4          Anesthesia Type: No value filed.    Des Phase I:      Des Phase II:    POST-OP ANESTHESIA NOTE       BP (!) 104/56   Pulse 58   Temp 98.6 °F (37 °C) (Bladder)   Resp 17   Ht 1.778 m (5' 10\")   Wt (!) 162.4 kg (358 lb 1.6 oz)   SpO2 98%   BMI 51.38 kg/m²    Pain Assessment: Critical Care Pain Observation Tool (CPOT)         Anesthesia Post Evaluation    Patient location during evaluation: ICU  Patient participation: complete - patient cannot participate  Level of consciousness: sedated and ventilated  Pain score: 0  Airway patency: patent  Nausea & Vomiting: no nausea and no vomiting  Cardiovascular status: hemodynamically stable  Respiratory status: ventilator and intubated (Trach tube in place)  Hydration status: stable  Pain management: adequate        There were no known notable events for this encounter.

## 2024-01-05 NOTE — BRIEF OP NOTE
Brief Postoperative Note      Patient: Ruben Dimas  YOB: 1965  MRN: [id number]    Date of Procedure: 1/5/2024    Pre-Op Diagnosis Codes:     * Necrotizing fasciitis (HCC) [M72.6]    Post-Op Diagnosis: Same       Procedure(s):  PREPARATION OF WOUND BED, PLACEMENT OF SKIN SUBSTITUTE, WOUND VAC PLACEMENT, IRRIGATION AND DEBRIDEMENT OF RIGHT LOWER ABDOMINAL WOUND    Surgeon(s):  Stephanie Joseph MD    Assistant:  * No surgical staff found *    Anesthesia: General    Estimated Blood Loss (mL): 20cc    Complications: None    Specimens:   ID Type Source Tests Collected by Time Destination   1 : ABDOMINAL WOUND TISSUE Tissue Abdomen CULTURE, TISSUE, FUNGAL STAIN, CULTURE, ANAEROBIC AND AEROBIC Stephanie Joseph MD 1/5/2024 1351        Implants:  Implant Name Type Inv. Item Serial No.  Lot No. LRB No. Used Action   DRESSING WND MICRONIZED PARTIC 1000 MG MATRISTEM MICROMATRIX - [id number]  DRESSING WND MICRONIZED PARTIC 1000 MG MATRISTEM MICROMATRIX  [employer] [id number] N/A 1 Implanted   DRESSING WND MICRONIZED PARTIC 1000 MG MATRISTEM MICROMATRIX - [id number]  DRESSING WND MICRONIZED PARTIC 1000 MG MATRISTEM MICROMATRIX  [employer] [id number] N/A 1 Implanted   DRESSING WND MICRONIZED PARTIC 1000 MG MATRISTEM MICROMATRIX - [id number]  DRESSING WND MICRONIZED PARTIC 1000 MG MATRISTEM MICROMATRIX  [employer] [id number] N/A 1 Implanted   DRESSING WND MICRONIZED PARTIC 1000 MG MATRISTEM MICROMATRIX - [id number]  DRESSING WND MICRONIZED PARTIC 1000 MG MATRISTEM MICROMATRIX  [employer] [id number] N/A 1 Implanted   DRESSING WND MICRONIZED PARTIC 1000 MG MATRISTEM MICROMATRIX - [id number]  DRESSING WND MICRONIZED PARTIC 1000 MG MATRISTEM MICROMATRIX  [employer] [id number] N/A 1 Implanted   DRESSING WND MICRONIZED PARTIC 1000 MG MATRISTEM MICROMATRIX - [id number]  DRESSING WND MICRONIZED PARTIC 1000 MG MATRISTEM MICROMATRIX  [employer] [id number] N/A 1 Implanted   DRESSING WND MICRONIZED PARTIC 1000 MG MATRISTEM  tubing;Drainage bag less than half full 01/02/24 2000   Status Draining 01/02/24 2000   Output (mL) 75 mL 01/02/24 1856       Findings:   Anterior abdominal wound: Clean wound bed, minimal areas requiring debridement. Final wound measuring 41 x 51cm (2091 sq cm)     Right lateral wound requiring additional incision and debridement    Electronically signed by Reshma Yao DO on 1/5/2024 at 3:24 PM    Stephanie Joseph MD

## 2024-01-05 NOTE — PLAN OF CARE
Problem: Discharge Planning  Goal: Discharge to home or other facility with appropriate resources  1/4/2024 2108 by Adrienne Contreras RN  Outcome: Progressing  1/4/2024 0732 by Milligan, Matthew, RN  Outcome: Progressing  Flowsheets (Taken 1/4/2024 0732)  Discharge to home or other facility with appropriate resources:   Identify barriers to discharge with patient and caregiver   Arrange for needed discharge resources and transportation as appropriate   Identify discharge learning needs (meds, wound care, etc)   Refer to discharge planning if patient needs post-hospital services based on physician order or complex needs related to functional status, cognitive ability or social support system     Problem: Pain  Goal: Verbalizes/displays adequate comfort level or baseline comfort level  1/4/2024 2108 by Adrienne Contreras RN  Outcome: Progressing  1/4/2024 0732 by Milligan, Matthew, RN  Outcome: Progressing  Flowsheets (Taken 1/4/2024 0732)  Verbalizes/displays adequate comfort level or baseline comfort level:   Encourage patient to monitor pain and request assistance   Assess pain using appropriate pain scale   Administer analgesics based on type and severity of pain and evaluate response   Implement non-pharmacological measures as appropriate and evaluate response   Consider cultural and social influences on pain and pain management   Notify Licensed Independent Practitioner if interventions unsuccessful or patient reports new pain     Problem: Safety - Adult  Goal: Free from fall injury  1/4/2024 2108 by Adrienne Contreras RN  Outcome: Progressing  1/4/2024 0732 by Milligan, Matthew, RN  Outcome: Progressing  Flowsheets (Taken 1/4/2024 0732)  Free From Fall Injury: Instruct family/caregiver on patient safety     Problem: Respiratory - Adult  Goal: Achieves optimal ventilation and oxygenation  1/4/2024 2108 by Adrienne Contreras RN  Outcome: Progressing  1/4/2024 1107 by Zachariah Crook RCP  Outcome:  Progressing  1/4/2024 0732 by Milligan, Matthew, RN  Outcome: Progressing  Flowsheets (Taken 1/4/2024 0732)  Achieves optimal ventilation and oxygenation:   Assess for changes in respiratory status   Assess for changes in mentation and behavior   Position to facilitate oxygenation and minimize respiratory effort   Oxygen supplementation based on oxygen saturation or arterial blood gases   Encourage broncho-pulmonary hygiene including cough, deep breathe, incentive spirometry   Assess the need for suctioning and aspirate as needed   Respiratory therapy support as indicated     Problem: Chronic Conditions and Co-morbidities  Goal: Patient's chronic conditions and co-morbidity symptoms are monitored and maintained or improved  1/4/2024 2108 by Adrienne Contreras RN  Outcome: Progressing  1/4/2024 0732 by Milligan, Matthew, RN  Outcome: Progressing  Flowsheets (Taken 1/4/2024 0732)  Care Plan - Patient's Chronic Conditions and Co-Morbidity Symptoms are Monitored and Maintained or Improved:   Monitor and assess patient's chronic conditions and comorbid symptoms for stability, deterioration, or improvement   Collaborate with multidisciplinary team to address chronic and comorbid conditions and prevent exacerbation or deterioration   Update acute care plan with appropriate goals if chronic or comorbid symptoms are exacerbated and prevent overall improvement and discharge     Problem: Skin/Tissue Integrity  Goal: Absence of new skin breakdown  Description: 1.  Monitor for areas of redness and/or skin breakdown  2.  Assess vascular access sites hourly  3.  Every 4-6 hours minimum:  Change oxygen saturation probe site  4.  Every 4-6 hours:  If on nasal continuous positive airway pressure, respiratory therapy assess nares and determine need for appliance change or resting period.  1/4/2024 2108 by Adrienne Contreras, RN  Outcome: Progressing  1/4/2024 0732 by Milligan, Matthew, RN  Outcome: Progressing     Problem: ABCDS Injury

## 2024-01-05 NOTE — PROGRESS NOTES
ICU PROGRESS NOTE        PATIENT NAME: Ruben Dimas  MEDICAL RECORD NO. [id number]  DATE: 1/5/2024    HD: # 4    ASSESSMENT    Patient Active Problem List   Diagnosis    Alcoholic cirrhosis of liver (HCC), sober since 2013    Peripheral edema    Bipolar disorder (HCC)    Type 2 diabetes mellitus with diabetic neuropathy, with long-term current use of insulin (HCC)    Essential hypertension, currently hypotensive    Dyslipidemia    Weakness    Acute metabolic encephalopathy    FABI (obstructive sleep apnea)    Primary osteoarthritis of right knee    Type 2 diabetes mellitus with diabetic neuropathy (HCC)    Acquired hypothyroidism    Urine retention    Anemia    Slow transit constipation    Iron deficiency anemia due to chronic blood loss    Gastroesophageal reflux disease without esophagitis    LEONARDA (acute kidney injury) (HCC)    Secondary esophageal varices without bleeding (HCC)    Portal hypertension (HCC)    Obesity, morbid, BMI 50 or higher (HCC)    Venous stasis dermatitis of both lower extremities    Cellulitis of perineum    Chronic indwelling Clemons catheter    Anxiety and depression    Back pain, chronic    BPH (benign prostatic hyperplasia)    Chronic diastolic CHF (congestive heart failure) (HCC)    Cirrhosis (HCC)    Diabetes mellitus (HCC)    GERD (gastroesophageal reflux disease)    Hepatitis    Hiatal hernia    Hypertension, essential    IBS (irritable bowel syndrome)    Morbid obesity with BMI of 45.0-49.9, adult (HCC)    Neuropathy    Chronic respiratory failure with hypoxia, on home oxygen therapy (HCC)    Sleep apnea    Thyroid disease    Urinary bladder neurogenic dysfunction    Acute UTI    Musculoskeletal immobility    Pyocystis    Myoclonic jerking    Thrombocytopenia (HCC)    Hypotension    Sepsis with acute hypercapnic respiratory failure and septic shock (HCC)    Right lower lobe pneumonia    Acute kidney injury superimposed on CKD (HCC)    Somnolence    Acute respiratory acidosis (HCC)     COMPARISON: 11/03/2023 HISTORY: ORDERING SYSTEM PROVIDED HISTORY:  Confirmation of course of NG/OG/NE tube and location of tip of tube. TECHNOLOGIST PROVIDED HISTORY: Confirmation of course of NG/OG/NE tube and location of tip of tube. Portable?  Yes FINDINGS: Enteric tube tip projects in the expected location of the stomach, with side-port just below the GE junction.  There is likely a gastrostomy tube. Nonspecific bowel gas pattern.  Surgical clips are noted.  Probable right basilar airspace opacities.     Enteric tube tip projects in the expected location of the stomach, with side-port just below the GE junction.        Domenica Horton DO  Emergency Medicine Resident PGY-2  Surgical Critical Care Service  1/5/2024, 7:14 AM

## 2024-01-05 NOTE — PLAN OF CARE
Problem: Respiratory - Adult  Goal: Achieves optimal ventilation and oxygenation  1/5/2024 1018 by Dina Martin, AGNIESZKA  Outcome: Progressing   MOBILIZE SECRETIONS    [x]   ASSESS BREATH SOUNDS  [x]   ASSESS SPUTUM PRODUCTION  [x]   COUGH AND DEEP BREATHING  []  IMPLEMENT SECRETION MANAGEMENT PROTOCOL  [x]   PATIENT EDUCATION AS NEEDED  PROVIDE ADEQUATE OXYGENATION WITH ACCEPTABLE SP02/ABG'S    [x]  IDENTIFY APPROPRIATE OXYGEN THERAPY  [x]   MONITOR SP02/ABG'S AS NEEDED   [x]   PATIENT EDUCATION AS NEEDED  BRONCHOSPASM/BRONCHOCONSTRICTION     [x]         IMPROVE AERATION/BREATH SOUNDS  [x]   ADMINISTER BRONCHODILATOR THERAPY AS APPROPRIATE  [x]   ASSESS BREATH SOUNDS  []   IMPLEMENT AEROSOL/MDI PROTOCOL  [x]   PATIENT EDUCATION AS NEEDED

## 2024-01-06 ENCOUNTER — APPOINTMENT (OUTPATIENT)
Dept: GENERAL RADIOLOGY | Age: 59
DRG: 853 | End: 2024-01-06
Payer: MEDICARE

## 2024-01-06 PROBLEM — I50.31 ACUTE HEART FAILURE WITH PRESERVED EJECTION FRACTION (HCC): Status: ACTIVE | Noted: 2024-01-06

## 2024-01-06 PROBLEM — I50.32 CHRONIC DIASTOLIC HEART FAILURE (HCC): Status: ACTIVE | Noted: 2024-01-06

## 2024-01-06 LAB
ALBUMIN SERPL-MCNC: 2.5 G/DL (ref 3.5–5.2)
ALBUMIN/GLOB SERPL: 0.9 {RATIO} (ref 1–2.5)
ALP SERPL-CCNC: 203 U/L (ref 40–129)
ALT SERPL-CCNC: 22 U/L (ref 5–41)
ANION GAP SERPL CALCULATED.3IONS-SCNC: 12 MMOL/L (ref 9–17)
AST SERPL-CCNC: 43 U/L
BASOPHILS # BLD: 0 K/UL (ref 0–0.2)
BASOPHILS NFR BLD: 0 % (ref 0–2)
BILIRUB SERPL-MCNC: 2.5 MG/DL (ref 0.3–1.2)
BUN SERPL-MCNC: 34 MG/DL (ref 6–20)
CA-I BLD-SCNC: 1.2 MMOL/L (ref 1.13–1.33)
CALCIUM SERPL-MCNC: 8.5 MG/DL (ref 8.6–10.4)
CHLORIDE SERPL-SCNC: 102 MMOL/L (ref 98–107)
CO2 SERPL-SCNC: 19 MMOL/L (ref 20–31)
CREAT SERPL-MCNC: 1.2 MG/DL (ref 0.7–1.2)
EOSINOPHIL # BLD: 0 K/UL (ref 0–0.4)
EOSINOPHILS RELATIVE PERCENT: 0 % (ref 1–4)
ERYTHROCYTE [DISTWIDTH] IN BLOOD BY AUTOMATED COUNT: 18.4 % (ref 11.8–14.4)
FIO2: 30
GFR SERPL CREATININE-BSD FRML MDRD: >60 ML/MIN/1.73M2
GLUCOSE BLD-MCNC: 135 MG/DL (ref 75–110)
GLUCOSE BLD-MCNC: 136 MG/DL (ref 74–100)
GLUCOSE BLD-MCNC: 137 MG/DL (ref 75–110)
GLUCOSE BLD-MCNC: 140 MG/DL (ref 75–110)
GLUCOSE BLD-MCNC: 144 MG/DL (ref 75–110)
GLUCOSE BLD-MCNC: 148 MG/DL (ref 75–110)
GLUCOSE BLD-MCNC: 152 MG/DL (ref 75–110)
GLUCOSE BLD-MCNC: 163 MG/DL (ref 75–110)
GLUCOSE BLD-MCNC: 164 MG/DL (ref 75–110)
GLUCOSE BLD-MCNC: 185 MG/DL (ref 75–110)
GLUCOSE BLD-MCNC: 190 MG/DL (ref 75–110)
GLUCOSE BLD-MCNC: 211 MG/DL (ref 75–110)
GLUCOSE BLD-MCNC: 214 MG/DL (ref 75–110)
GLUCOSE BLD-MCNC: 214 MG/DL (ref 75–110)
GLUCOSE BLD-MCNC: 229 MG/DL (ref 75–110)
GLUCOSE SERPL-MCNC: 168 MG/DL (ref 70–99)
HCT VFR BLD AUTO: 25.8 % (ref 40.7–50.3)
HGB BLD-MCNC: 8.7 G/DL (ref 13–17)
IMM GRANULOCYTES # BLD AUTO: 0.78 K/UL (ref 0–0.3)
IMM GRANULOCYTES NFR BLD: 3 %
LYMPHOCYTES NFR BLD: 1.57 K/UL (ref 1–4.8)
LYMPHOCYTES RELATIVE PERCENT: 6 % (ref 24–44)
MAGNESIUM SERPL-MCNC: 2.2 MG/DL (ref 1.6–2.6)
MCH RBC QN AUTO: 30.2 PG (ref 25.2–33.5)
MCHC RBC AUTO-ENTMCNC: 33.7 G/DL (ref 28.4–34.8)
MCV RBC AUTO: 89.6 FL (ref 82.6–102.9)
MICROORGANISM SPEC CULT: NORMAL
MICROORGANISM/AGENT SPEC: NORMAL
MONOCYTES NFR BLD: 1.83 K/UL (ref 0.1–0.8)
MONOCYTES NFR BLD: 7 % (ref 1–7)
MORPHOLOGY: ABNORMAL
NEGATIVE BASE EXCESS, ART: 3 MMOL/L (ref 0–2)
NEUTROPHILS NFR BLD: 84 % (ref 36–66)
NEUTS SEG NFR BLD: 21.92 K/UL (ref 1.8–7.7)
NRBC BLD-RTO: 0 PER 100 WBC
O2 DELIVERY DEVICE: ABNORMAL
PATIENT TEMP: 36.6
PHOSPHATE SERPL-MCNC: 4.6 MG/DL (ref 2.5–4.5)
PLATELET # BLD AUTO: 108 K/UL (ref 138–453)
PMV BLD AUTO: 11 FL (ref 8.1–13.5)
POC HCO3: 21.9 MMOL/L (ref 21–28)
POC LACTIC ACID: 1.6 MMOL/L (ref 0.56–1.39)
POC O2 SATURATION: 98.5 % (ref 94–98)
POC PCO2: 37.5 MM HG (ref 35–48)
POC PH: 7.37 (ref 7.35–7.45)
POC PO2: 116.7 MM HG (ref 83–108)
POTASSIUM SERPL-SCNC: 4.1 MMOL/L (ref 3.7–5.3)
PROT SERPL-MCNC: 5.3 G/DL (ref 6.4–8.3)
RBC # BLD AUTO: 2.88 M/UL (ref 4.21–5.77)
SAMPLE SITE: ABNORMAL
SERVICE CMNT-IMP: NORMAL
SODIUM SERPL-SCNC: 133 MMOL/L (ref 135–144)
SPECIMEN DESCRIPTION: NORMAL
SPECIMEN DESCRIPTION: NORMAL
TRIGL SERPL-MCNC: 214 MG/DL
TROPONIN I SERPL HS-MCNC: 28 NG/L (ref 0–22)
TROPONIN I SERPL HS-MCNC: 29 NG/L (ref 0–22)
WBC OTHER # BLD: 26.1 K/UL (ref 3.5–11.3)

## 2024-01-06 PROCEDURE — 71045 X-RAY EXAM CHEST 1 VIEW: CPT

## 2024-01-06 PROCEDURE — 6370000000 HC RX 637 (ALT 250 FOR IP): Performed by: NURSE PRACTITIONER

## 2024-01-06 PROCEDURE — 85025 COMPLETE CBC W/AUTO DIFF WBC: CPT

## 2024-01-06 PROCEDURE — 84478 ASSAY OF TRIGLYCERIDES: CPT

## 2024-01-06 PROCEDURE — 83605 ASSAY OF LACTIC ACID: CPT

## 2024-01-06 PROCEDURE — 2580000003 HC RX 258: Performed by: NURSE PRACTITIONER

## 2024-01-06 PROCEDURE — 6370000000 HC RX 637 (ALT 250 FOR IP): Performed by: EMERGENCY MEDICINE

## 2024-01-06 PROCEDURE — 2000000000 HC ICU R&B

## 2024-01-06 PROCEDURE — 83735 ASSAY OF MAGNESIUM: CPT

## 2024-01-06 PROCEDURE — 2500000003 HC RX 250 WO HCPCS: Performed by: EMERGENCY MEDICINE

## 2024-01-06 PROCEDURE — 82330 ASSAY OF CALCIUM: CPT

## 2024-01-06 PROCEDURE — 94003 VENT MGMT INPAT SUBQ DAY: CPT

## 2024-01-06 PROCEDURE — P9041 ALBUMIN (HUMAN),5%, 50ML: HCPCS | Performed by: EMERGENCY MEDICINE

## 2024-01-06 PROCEDURE — 80053 COMPREHEN METABOLIC PANEL: CPT

## 2024-01-06 PROCEDURE — 6360000002 HC RX W HCPCS: Performed by: NURSE PRACTITIONER

## 2024-01-06 PROCEDURE — C9113 INJ PANTOPRAZOLE SODIUM, VIA: HCPCS | Performed by: NURSE PRACTITIONER

## 2024-01-06 PROCEDURE — 99233 SBSQ HOSP IP/OBS HIGH 50: CPT | Performed by: INTERNAL MEDICINE

## 2024-01-06 PROCEDURE — 6360000002 HC RX W HCPCS: Performed by: EMERGENCY MEDICINE

## 2024-01-06 PROCEDURE — 94761 N-INVAS EAR/PLS OXIMETRY MLT: CPT

## 2024-01-06 PROCEDURE — 2500000003 HC RX 250 WO HCPCS: Performed by: NURSE PRACTITIONER

## 2024-01-06 PROCEDURE — 84100 ASSAY OF PHOSPHORUS: CPT

## 2024-01-06 PROCEDURE — 84484 ASSAY OF TROPONIN QUANT: CPT

## 2024-01-06 PROCEDURE — 2580000003 HC RX 258

## 2024-01-06 PROCEDURE — 6360000002 HC RX W HCPCS

## 2024-01-06 PROCEDURE — 2500000003 HC RX 250 WO HCPCS

## 2024-01-06 PROCEDURE — A4216 STERILE WATER/SALINE, 10 ML: HCPCS | Performed by: NURSE PRACTITIONER

## 2024-01-06 PROCEDURE — 2700000000 HC OXYGEN THERAPY PER DAY

## 2024-01-06 PROCEDURE — 99291 CRITICAL CARE FIRST HOUR: CPT | Performed by: SURGERY

## 2024-01-06 PROCEDURE — 82803 BLOOD GASES ANY COMBINATION: CPT

## 2024-01-06 PROCEDURE — 37799 UNLISTED PX VASCULAR SURGERY: CPT

## 2024-01-06 RX ORDER — INSULIN LISPRO 100 [IU]/ML
0-16 INJECTION, SOLUTION INTRAVENOUS; SUBCUTANEOUS
Status: DISCONTINUED | OUTPATIENT
Start: 2024-01-06 | End: 2024-01-07

## 2024-01-06 RX ORDER — MAGNESIUM SULFATE IN WATER 40 MG/ML
2000 INJECTION, SOLUTION INTRAVENOUS PRN
Status: DISCONTINUED | OUTPATIENT
Start: 2024-01-06 | End: 2024-01-08

## 2024-01-06 RX ORDER — POTASSIUM CHLORIDE 7.45 MG/ML
10 INJECTION INTRAVENOUS PRN
Status: DISCONTINUED | OUTPATIENT
Start: 2024-01-06 | End: 2024-01-08

## 2024-01-06 RX ORDER — DEXTROSE MONOHYDRATE, SODIUM CHLORIDE, AND POTASSIUM CHLORIDE 50; 1.49; 4.5 G/1000ML; G/1000ML; G/1000ML
INJECTION, SOLUTION INTRAVENOUS CONTINUOUS
Status: DISCONTINUED | OUTPATIENT
Start: 2024-01-06 | End: 2024-01-08

## 2024-01-06 RX ORDER — ALBUMIN, HUMAN INJ 5% 5 %
25 SOLUTION INTRAVENOUS ONCE
Status: COMPLETED | OUTPATIENT
Start: 2024-01-06 | End: 2024-01-06

## 2024-01-06 RX ORDER — POTASSIUM CHLORIDE 29.8 MG/ML
20 INJECTION INTRAVENOUS PRN
Status: DISCONTINUED | OUTPATIENT
Start: 2024-01-06 | End: 2024-01-08

## 2024-01-06 RX ORDER — INSULIN LISPRO 100 [IU]/ML
0-4 INJECTION, SOLUTION INTRAVENOUS; SUBCUTANEOUS NIGHTLY
Status: DISCONTINUED | OUTPATIENT
Start: 2024-01-06 | End: 2024-01-07

## 2024-01-06 RX ORDER — PROPOFOL 10 MG/ML
5-50 INJECTION, EMULSION INTRAVENOUS CONTINUOUS
Status: DISCONTINUED | OUTPATIENT
Start: 2024-01-06 | End: 2024-01-11

## 2024-01-06 RX ADMIN — ACETAMINOPHEN 1000 MG: 650 SOLUTION ORAL at 20:59

## 2024-01-06 RX ADMIN — SODIUM CHLORIDE, POTASSIUM CHLORIDE, SODIUM LACTATE AND CALCIUM CHLORIDE: 600; 310; 30; 20 INJECTION, SOLUTION INTRAVENOUS at 00:09

## 2024-01-06 RX ADMIN — HEPARIN SODIUM 5000 UNITS: 5000 INJECTION INTRAVENOUS; SUBCUTANEOUS at 06:30

## 2024-01-06 RX ADMIN — POTASSIUM CHLORIDE: 2 INJECTION, SOLUTION, CONCENTRATE INTRAVENOUS at 17:43

## 2024-01-06 RX ADMIN — Medication 200 MCG/HR: at 20:44

## 2024-01-06 RX ADMIN — SODIUM CHLORIDE, PRESERVATIVE FREE 10 ML: 5 INJECTION INTRAVENOUS at 21:03

## 2024-01-06 RX ADMIN — MAGNESIUM SULFATE HEPTAHYDRATE 1000 MG: 1 INJECTION, SOLUTION INTRAVENOUS at 00:13

## 2024-01-06 RX ADMIN — ACETAMINOPHEN 1000 MG: 650 SOLUTION ORAL at 06:31

## 2024-01-06 RX ADMIN — PROPOFOL 25 MCG/KG/MIN: 10 INJECTION, EMULSION INTRAVENOUS at 16:54

## 2024-01-06 RX ADMIN — PROPOFOL 25 MCG/KG/MIN: 10 INJECTION, EMULSION INTRAVENOUS at 20:36

## 2024-01-06 RX ADMIN — DOCUSATE SODIUM LIQUID 100 MG: 100 LIQUID ORAL at 09:17

## 2024-01-06 RX ADMIN — CALCIUM GLUCONATE 1000 MG: 20 INJECTION, SOLUTION INTRAVENOUS at 00:16

## 2024-01-06 RX ADMIN — PANTOPRAZOLE SODIUM 40 MG: 40 INJECTION, POWDER, FOR SOLUTION INTRAVENOUS at 09:18

## 2024-01-06 RX ADMIN — SODIUM CHLORIDE 4.73 UNITS/HR: 9 INJECTION, SOLUTION INTRAVENOUS at 09:46

## 2024-01-06 RX ADMIN — Medication 125 MCG/HR: at 07:44

## 2024-01-06 RX ADMIN — DEXMEDETOMIDINE HYDROCHLORIDE 0.2 MCG/KG/HR: 400 INJECTION, SOLUTION INTRAVENOUS at 08:58

## 2024-01-06 RX ADMIN — MEROPENEM 1000 MG: 1 INJECTION, POWDER, FOR SOLUTION INTRAVENOUS at 12:04

## 2024-01-06 RX ADMIN — INSULIN LISPRO 4 UNITS: 100 INJECTION, SOLUTION INTRAVENOUS; SUBCUTANEOUS at 17:37

## 2024-01-06 RX ADMIN — SENNOSIDES 8.8 MG: 8.8 LIQUID ORAL at 09:17

## 2024-01-06 RX ADMIN — DOCUSATE SODIUM LIQUID 100 MG: 100 LIQUID ORAL at 20:59

## 2024-01-06 RX ADMIN — I.V. FAT EMULSION 250 ML: 20 EMULSION INTRAVENOUS at 17:50

## 2024-01-06 RX ADMIN — Medication 6 MCG/MIN: at 20:49

## 2024-01-06 RX ADMIN — ALBUMIN (HUMAN) 25 G: 12.5 INJECTION, SOLUTION INTRAVENOUS at 12:07

## 2024-01-06 RX ADMIN — MICONAZOLE NITRATE: 20 CREAM TOPICAL at 21:03

## 2024-01-06 RX ADMIN — SODIUM CHLORIDE, PRESERVATIVE FREE 10 ML: 5 INJECTION INTRAVENOUS at 09:17

## 2024-01-06 RX ADMIN — MEROPENEM 1000 MG: 1 INJECTION, POWDER, FOR SOLUTION INTRAVENOUS at 05:27

## 2024-01-06 RX ADMIN — PROPOFOL 20 MCG/KG/MIN: 10 INJECTION, EMULSION INTRAVENOUS at 11:40

## 2024-01-06 RX ADMIN — SODIUM CHLORIDE, POTASSIUM CHLORIDE, SODIUM LACTATE AND CALCIUM CHLORIDE: 600; 310; 30; 20 INJECTION, SOLUTION INTRAVENOUS at 10:00

## 2024-01-06 RX ADMIN — HEPARIN SODIUM 5000 UNITS: 5000 INJECTION INTRAVENOUS; SUBCUTANEOUS at 14:08

## 2024-01-06 RX ADMIN — PANTOPRAZOLE SODIUM 40 MG: 40 INJECTION, POWDER, FOR SOLUTION INTRAVENOUS at 20:59

## 2024-01-06 RX ADMIN — LEVOTHYROXINE SODIUM 200 MCG: 100 TABLET ORAL at 07:47

## 2024-01-06 RX ADMIN — SENNOSIDES 8.8 MG: 8.8 LIQUID ORAL at 20:59

## 2024-01-06 RX ADMIN — POTASSIUM CHLORIDE, DEXTROSE MONOHYDRATE AND SODIUM CHLORIDE: 150; 5; 450 INJECTION, SOLUTION INTRAVENOUS at 12:03

## 2024-01-06 RX ADMIN — MICONAZOLE NITRATE: 20 CREAM TOPICAL at 09:18

## 2024-01-06 RX ADMIN — ASPIRIN 81 MG 81 MG: 81 TABLET ORAL at 09:17

## 2024-01-06 RX ADMIN — HEPARIN SODIUM 5000 UNITS: 5000 INJECTION INTRAVENOUS; SUBCUTANEOUS at 22:30

## 2024-01-06 RX ADMIN — ACETAMINOPHEN 1000 MG: 650 SOLUTION ORAL at 14:08

## 2024-01-06 RX ADMIN — MEROPENEM 1000 MG: 1 INJECTION, POWDER, FOR SOLUTION INTRAVENOUS at 21:02

## 2024-01-06 ASSESSMENT — PULMONARY FUNCTION TESTS
PIF_VALUE: 24
PIF_VALUE: 26
PIF_VALUE: 24
PIF_VALUE: 23
PIF_VALUE: 27
PIF_VALUE: 24
PIF_VALUE: 29
PIF_VALUE: 24
PIF_VALUE: 25
PIF_VALUE: 24
PIF_VALUE: 23
PIF_VALUE: 24
PIF_VALUE: 23
PIF_VALUE: 30
PIF_VALUE: 23
PIF_VALUE: 24
PIF_VALUE: 24

## 2024-01-06 NOTE — PROGRESS NOTES
ICU PROGRESS NOTE        PATIENT NAME: Ruben Dimas  MEDICAL RECORD NO. [id number]  DATE: 1/6/2024    HD: # 5    ASSESSMENT    Patient Active Problem List   Diagnosis    Alcoholic cirrhosis of liver (HCC), sober since 2013    Peripheral edema    Bipolar disorder (HCC)    Type 2 diabetes mellitus with diabetic neuropathy, with long-term current use of insulin (HCC)    Essential hypertension, currently hypotensive    Dyslipidemia    Weakness    Acute metabolic encephalopathy    FABI (obstructive sleep apnea)    Primary osteoarthritis of right knee    Type 2 diabetes mellitus with diabetic neuropathy (HCC)    Acquired hypothyroidism    Urine retention    Anemia    Slow transit constipation    Iron deficiency anemia due to chronic blood loss    Gastroesophageal reflux disease without esophagitis    LEONARDA (acute kidney injury) (HCC)    Secondary esophageal varices without bleeding (HCC)    Portal hypertension (HCC)    Obesity, morbid, BMI 50 or higher (HCC)    Venous stasis dermatitis of both lower extremities    Cellulitis of perineum    Chronic indwelling Clemons catheter    Anxiety and depression    Back pain, chronic    BPH (benign prostatic hyperplasia)    Chronic diastolic CHF (congestive heart failure) (HCC)    Cirrhosis (HCC)    Diabetes mellitus (HCC)    GERD (gastroesophageal reflux disease)    Hepatitis    Hiatal hernia    Hypertension, essential    IBS (irritable bowel syndrome)    Morbid obesity with BMI of 45.0-49.9, adult (HCC)    Neuropathy    Chronic respiratory failure with hypoxia, on home oxygen therapy (HCC)    Sleep apnea    Thyroid disease    Urinary bladder neurogenic dysfunction    Acute UTI    Musculoskeletal immobility    Pyocystis    Myoclonic jerking    Thrombocytopenia (HCC)    Hypotension    Sepsis with acute hypercapnic respiratory failure and septic shock (HCC)    Right lower lobe pneumonia    Acute kidney injury superimposed on CKD (HCC)    Somnolence    Acute respiratory acidosis (HCC)

## 2024-01-06 NOTE — PLAN OF CARE
Problem: Respiratory - Adult  Goal: Achieves optimal ventilation and oxygenation  1/6/2024 0922 by Zachariah Crook, AGNIESZKA  Outcome: Progressing

## 2024-01-06 NOTE — OP NOTE
Operative Note      Patient: Ruben Dimas  YOB: 1965  MRN: [id number]    Date of Procedure: 1/5/2024    Pre-Op Diagnosis Codes:     * Necrotizing fasciitis (HCC) [M72.6]    Post-Op Diagnosis: Same       Procedure(s):  PREPARATION OF WOUND BED, PLACEMENT OF SKIN SUBSTITUTE, WOUND VAC PLACEMENT, IRRIGATION AND DEBRIDEMENT OF RIGHT LOWER ABDOMINAL WOUND  Midline wound: Preparation of wound bed achieved through sharp, excisional debridement down to the level of the fascia including removal of non-viable fascia [id number] (2091 sq cm)  RLQ wound: sharp excisional debridement down to, and including the subcutaneous tissue 3x12cm (36 sq cm)    Surgeon(s):  Stephanie Joseph MD    Assistant:   * No surgical staff found *    Anesthesia: General    Estimated Blood Loss (mL): 20cc    Complications: None    Specimens:   ID Type Source Tests Collected by Time Destination   1 : ABDOMINAL WOUND TISSUE Tissue Abdomen CULTURE, TISSUE, FUNGAL STAIN, CULTURE, ANAEROBIC AND AEROBIC Stephanie Joseph MD 1/5/2024 1351        Implants:  Implant Name Type Inv. Item Serial No.  Lot No. LRB No. Used Action   DRESSING WND MICRONIZED PARTIC 1000 MG MATRISTEM MICROMATRIX - [id number]  DRESSING WND MICRONIZED PARTIC 1000 MG MATRISTEM MICROMATRIX  [employer] [id number] N/A 1 Implanted   DRESSING WND MICRONIZED PARTIC 1000 MG [employer]RISTEM MICROMATRIX - [id number]  DRESSING WND MICRONIZED PARTIC 1000 MG MATRISTEM MICROMATRIX  [employer] [id number] N/A 1 Implanted   DRESSING WND MICRONIZED PARTIC 1000 MG MATRISTEM MICROMATRIX - [id number]  DRESSING WND MICRONIZED PARTIC 1000 MG MATRISTEM MICROMATRIX  [employer] [id number] N/A 1 Implanted   DRESSING WND MICRONIZED PARTIC 1000 MG MATRISTEM MICROMATRIX - [id number]  DRESSING WND MICRONIZED PARTIC 1000 MG MATRISTEM MICROMATRIX  [employer] [id number] N/A 1 Implanted   DRESSING WND MICRONIZED PARTIC 1000 MG MATRISTEM MICROMATRIX - [id number]  DRESSING WND MICRONIZED PARTIC 1000 MG MATRISTEM

## 2024-01-06 NOTE — PLAN OF CARE
Problem: Discharge Planning  Goal: Discharge to home or other facility with appropriate resources  Outcome: Progressing     Problem: Pain  Goal: Verbalizes/displays adequate comfort level or baseline comfort level  Outcome: Progressing     Problem: Safety - Adult  Goal: Free from fall injury  Outcome: Progressing     Problem: Respiratory - Adult  Goal: Achieves optimal ventilation and oxygenation  Outcome: Progressing     Problem: Chronic Conditions and Co-morbidities  Goal: Patient's chronic conditions and co-morbidity symptoms are monitored and maintained or improved  Outcome: Progressing     Problem: Skin/Tissue Integrity  Goal: Absence of new skin breakdown  Description: 1.  Monitor for areas of redness and/or skin breakdown  2.  Assess vascular access sites hourly  3.  Every 4-6 hours minimum:  Change oxygen saturation probe site  4.  Every 4-6 hours:  If on nasal continuous positive airway pressure, respiratory therapy assess nares and determine need for appliance change or resting period.  Outcome: Progressing     Problem: ABCDS Injury Assessment  Goal: Absence of physical injury  Outcome: Progressing     Problem: Nutrition Deficit:  Goal: Optimize nutritional status  Outcome: Progressing  Flowsheets (Taken 1/5/2024 1433 by Dorothea Don, MS, RD, LD)  Nutrient intake appropriate for improving, restoring, or maintaining nutritional needs:   Assess nutritional status and recommend course of action   Recommend, monitor, and adjust tube feedings and TPN/PPN based on assessed needs

## 2024-01-06 NOTE — PROGRESS NOTES
Comprehensive Nutrition Assessment    Type and Reason for Visit:  Consult (TPN)    Nutrition Recommendations/Plan:   TPN/IL to continue, remove K-Phos, increase NaCl, remove MVI/Trace Elements from next bag. Continue 250 mL 20% IL daily.      Malnutrition Assessment:  Malnutrition Status:  Insufficient data (01/03/24 1416)    Context:  Acute Illness     Findings of the 6 clinical characteristics of malnutrition:  Energy Intake:  Mild decrease in energy intake (Comment)  Weight Loss:  Greater than 7.5% over 3 months     Body Fat Loss:  Unable to assess     Muscle Mass Loss:  Unable to assess    Fluid Accumulation:  Moderate to Severe Generalized   Strength:  Not Performed    Nutrition Assessment:    Pt s/p wound vac replacement & debridement of abdominal wall wound (1/5/24). Per General Surgery, no TF while pt having bloody output from NGT. Pt +NGT, +G-tube. Pt +trach, no propofol. No BM noted. +2 pitting generalized/BLE, +2 BUE edema noted. Labs reviewed: Na+ 133 mmol/L, Cl 102 mmol/L, K+ 4.1 mmol/L, Mg 2.2 mg/dL, Phos 4.6 mg/dL.    Nutrition Related Findings:    labs/meds reviewed Wound Type: Surgical Incision, Wound Vac (to abdomen)       Current Nutrition Intake & Therapies:    Average Meal Intake: NPO  Average Supplements Intake: NPO  Diet NPO Exceptions are: Sips of Water with Meds  PN-Adult 2-in-1 Central Line (Standard)  Current Parenteral Nutrition Orders:  Type and Formula: 2-in-1 Standard   Lipids: 250ml, Daily  Duration: Continuous  Rate/Volume: 56.7  Current PN Order Provides: 1580 kcal, 100 g PRO  Goal PN Orders Provides:      Anthropometric Measures:  Height: 177.8 cm (5' 10\")  Ideal Body Weight (IBW): 166 lbs (75 kg)    Admission Body Weight: 156.9 kg (345 lb 14.4 oz)  Current Body Weight: 162.4 kg (358 lb 0.4 oz) (1/5/24), 215.7 % IBW. Weight Source: Bed Scale  Current BMI (kg/m2): 51.4  Usual Body Weight: 192 kg (423 lb 5 oz) (10/19/23 bed scale per chart review)  % Weight Change (Calculated):  -17.6  Weight Adjustment For: No Adjustment                 BMI Categories: Obese Class 3 (BMI 40.0 or greater)    Estimated Daily Nutrient Needs:  Energy Requirements Based On: Kcal/kg  Weight Used for Energy Requirements: Admission  Energy (kcal/day): 2838-9865 kcals/day  Weight Used for Protein Requirements: Ideal  Protein (g/day): 110-150 gm pro/day  Method Used for Fluid Requirements: Other (Comment)  Fluid (ml/day): per MD    Nutrition Diagnosis:   Inadequate oral intake related to impaired respiratory function as evidenced by NPO or clear liquid status due to medical condition    Nutrition Interventions:   Food and/or Nutrient Delivery: Continue NPO, Continue Current Parenteral Nutrition  Nutrition Education/Counseling: No recommendation at this time  Coordination of Nutrition Care: Continue to monitor while inpatient  Plan of Care discussed with: RN    Goals:  Previous Goal Met: Progressing toward Goal(s)  Goals: Initiate nutrition support, by next RD assessment       Nutrition Monitoring and Evaluation:   Behavioral-Environmental Outcomes: None Identified  Food/Nutrient Intake Outcomes: Parenteral Nutrition Intake/Tolerance  Physical Signs/Symptoms Outcomes: Biochemical Data, GI Status, Fluid Status or Edema, Hemodynamic Status, Weight, Skin, Nutrition Focused Physical Findings    Discharge Planning:    Too soon to determine     Dorothea Don, MS, RD, LD  Contact:   Floor Mobile: [phone]  Desk Phone: [phone]  RD Weekend Mobile: [phone]

## 2024-01-06 NOTE — PROGRESS NOTES
[facility]   Progress Note                   Date:   1/6/2024  Patient name: Ruben Dimas  Date of admission:  1/1/2024  2:37 PM  MRN:   [id number]  YOB: 1965  PCP: Ramy Lazo MD    Reason for Admission: sepsis    Subjective:       The patient was seen and evaluated at bedside, was bradycardic overnight and amio gtt was held.   Currently in a fib, rate controlled.   Labs and imaging reviewed.     Medications:   Scheduled Meds:   fat emulsion  250 mL IntraVENous Daily    acetaminophen  1,000 mg Oral 3 times per day    docusate  100 mg Oral BID    senna  5 mL Oral BID    aspirin  81 mg Per NG tube Daily    pantoprazole (PROTONIX) 40 mg in sodium chloride (PF) 0.9 % 10 mL injection  40 mg IntraVENous Q12H    sodium chloride flush  5-40 mL IntraVENous 2 times per day    levothyroxine  200 mcg Per G Tube Daily    meropenem  1,000 mg IntraVENous Q8H    heparin (porcine)  5,000 Units SubCUTAneous 3 times per day    miconazole   Topical BID     Continuous Infusions:   PN-Adult 2-in-1 Central Line (Standard) 41.7 mL/hr at 01/06/24 0219    lactated ringers IV soln 125 mL/hr at 01/06/24 0219    insulin 4.19 Units/hr (01/06/24 0738)    vasopressin 20 Units in sodium chloride 0.9 % 100 mL infusion 0.03 Units/min (01/06/24 0219)    fentaNYL 50 mcg/mL 125 mcg/hr (01/06/24 0744)    dextrose      propofol Stopped (01/02/24 1356)    dexmedeTOMIDine Stopped (01/05/24 1846)    sodium chloride Stopped (01/06/24 0010)    norepinephrine Stopped (01/05/24 1701)    amiodarone Stopped (01/05/24 2002)     CBC:   Recent Labs     01/05/24 0432 01/05/24 1918 01/06/24 0458   WBC 20.2* 15.6* 26.1*   HGB 8.5* 8.5* 8.7*   PLT 97* 84* 108*       BMP:    Recent Labs     01/05/24 0432 01/05/24 1918 01/06/24 0458   * 133* 133*   K 3.3* 4.6 4.1    102 102   CO2 24 16* 19*   BUN 37* 33* 34*   CREATININE 1.2 1.1 1.2   GLUCOSE 144* 254* 168*       Hepatic:   Recent Labs     01/04/24  0430 01/05/24  0432

## 2024-01-06 NOTE — PROGRESS NOTES
[facility] -   Infectious diseases evaluation  admission date 1/1/2024    reason for consultation:   Necrotizing Faciitis    Impression :   Current:  1/1/24 Necrotizing faciitis 2/2 Polymicrobial infection with T2DM and PEG tube dislodgement and infected   LEONARDA on CKD   1/2/24 S/p partial wedge gastrectomy due to infected PEG tube and dislodgement   1/2/24 S/p debridement of abdomen and indwelling mesh removal,  due to concern for necrotizing fasciitis, wound vac in place  1/3/24 GASTROSTOMY TUBE PLACEMENT, REPAIR OF LARGE VENTRAL DEFECT   Alcoholic Liver cirrhosis - found intra op  Elevated CRP  Lactic acidosis- initial 4.1 now 6.2  Bandemia leukemoid reaction- WBC 15 to 38 to 27 -45  Proteus UTI  RLL pseudomonas pneumonia     Other:    Discussion / summary of stay / plan of care   Watch plts  Consider dapto if plts drop  Defer vanco due to renal issues  Recommendations     Respiratory culture: pseudomonas multiS  U cx proteus multiS  Bcx pending - SCN x 1  is a contamination  MRSA swab and COVID pending   No abd wound cx taken  Leukemoid reaction persistent    Keep meropenem and zyvox -   Plts stable      Infection Control Recommendations   [place] Precautions  Contact Isolation       Antimicrobial Stewardship Recommendations   Simplification of therapy  Targeted therapy    History of Present Illness:   Initial history:  Ruben Dimas is a 58 y.o.-year-old male presents from extended-care facility via EMS for complaint of chest pain/abdominal pain.  Patient has a history of type 2 diabetes melitis, hypertension, hyperlipidemia, hypothyroidism, heart failure with preserved ejection fraction, PEG tube, liver cirrhosis, chronic trach secondary to chronic respiratory failure, A-fib, chronic urinary cath, obesity and bilateral lower extremity lymphedema. Recent hospitalization in December 2023.   On 1/1/2023 patient was brought by EMS where he was given 10 mg of Cardizem

## 2024-01-07 ENCOUNTER — APPOINTMENT (OUTPATIENT)
Age: 59
DRG: 853 | End: 2024-01-07
Payer: MEDICARE

## 2024-01-07 ENCOUNTER — APPOINTMENT (OUTPATIENT)
Dept: CT IMAGING | Age: 59
DRG: 853 | End: 2024-01-07
Payer: MEDICARE

## 2024-01-07 ENCOUNTER — APPOINTMENT (OUTPATIENT)
Dept: GENERAL RADIOLOGY | Age: 59
DRG: 853 | End: 2024-01-07
Payer: MEDICARE

## 2024-01-07 LAB
ALBUMIN SERPL-MCNC: 2.7 G/DL (ref 3.5–5.2)
ALBUMIN/GLOB SERPL: 1 {RATIO} (ref 1–2.5)
ALP SERPL-CCNC: 206 U/L (ref 40–129)
ALT SERPL-CCNC: 20 U/L (ref 5–41)
ANION GAP SERPL CALCULATED.3IONS-SCNC: 11 MMOL/L (ref 9–17)
ANION GAP SERPL CALCULATED.3IONS-SCNC: 13 MMOL/L (ref 9–17)
AST SERPL-CCNC: 38 U/L
BASOPHILS # BLD: 0 K/UL (ref 0–0.2)
BASOPHILS NFR BLD: 0 % (ref 0–2)
BILIRUB DIRECT SERPL-MCNC: 0.8 MG/DL
BILIRUB INDIRECT SERPL-MCNC: 0.6 MG/DL (ref 0–1)
BILIRUB SERPL-MCNC: 1.4 MG/DL (ref 0.3–1.2)
BUN SERPL-MCNC: 35 MG/DL (ref 6–20)
BUN SERPL-MCNC: 36 MG/DL (ref 6–20)
CA-I BLD-SCNC: 1.2 MMOL/L (ref 1.13–1.33)
CALCIUM SERPL-MCNC: 8.3 MG/DL (ref 8.6–10.4)
CALCIUM SERPL-MCNC: 8.6 MG/DL (ref 8.6–10.4)
CHLORIDE SERPL-SCNC: 103 MMOL/L (ref 98–107)
CHLORIDE SERPL-SCNC: 105 MMOL/L (ref 98–107)
CO2 SERPL-SCNC: 18 MMOL/L (ref 20–31)
CO2 SERPL-SCNC: 18 MMOL/L (ref 20–31)
CREAT SERPL-MCNC: 1.1 MG/DL (ref 0.7–1.2)
CREAT SERPL-MCNC: 1.1 MG/DL (ref 0.7–1.2)
CREAT UR-MCNC: 53.8 MG/DL (ref 39–259)
EKG ATRIAL RATE: 267 BPM
EKG Q-T INTERVAL: 402 MS
EKG QRS DURATION: 166 MS
EKG QTC CALCULATION (BAZETT): 381 MS
EKG R AXIS: -36 DEGREES
EKG T AXIS: 49 DEGREES
EKG VENTRICULAR RATE: 54 BPM
EOSINOPHIL # BLD: 0.4 K/UL (ref 0–0.44)
EOSINOPHILS RELATIVE PERCENT: 2 % (ref 1–4)
ERYTHROCYTE [DISTWIDTH] IN BLOOD BY AUTOMATED COUNT: 18.9 % (ref 11.8–14.4)
FIO2: 30
GFR SERPL CREATININE-BSD FRML MDRD: >60 ML/MIN/1.73M2
GFR SERPL CREATININE-BSD FRML MDRD: >60 ML/MIN/1.73M2
GLUCOSE BLD-MCNC: 124 MG/DL (ref 75–110)
GLUCOSE BLD-MCNC: 147 MG/DL (ref 75–110)
GLUCOSE BLD-MCNC: 170 MG/DL (ref 75–110)
GLUCOSE BLD-MCNC: 190 MG/DL (ref 75–110)
GLUCOSE BLD-MCNC: 191 MG/DL (ref 75–110)
GLUCOSE BLD-MCNC: 195 MG/DL (ref 75–110)
GLUCOSE BLD-MCNC: 221 MG/DL (ref 75–110)
GLUCOSE BLD-MCNC: 246 MG/DL (ref 75–110)
GLUCOSE BLD-MCNC: 269 MG/DL (ref 74–100)
GLUCOSE BLD-MCNC: 284 MG/DL (ref 75–110)
GLUCOSE SERPL-MCNC: 272 MG/DL (ref 70–99)
GLUCOSE SERPL-MCNC: 281 MG/DL (ref 70–99)
HCT VFR BLD AUTO: 24.3 % (ref 40.7–50.3)
HCT VFR BLD AUTO: 27.3 % (ref 40.7–50.3)
HGB BLD-MCNC: 7.8 G/DL (ref 13–17)
HGB BLD-MCNC: 8.8 G/DL (ref 13–17)
IMM GRANULOCYTES # BLD AUTO: 1.61 K/UL (ref 0–0.3)
IMM GRANULOCYTES NFR BLD: 8 %
LYMPHOCYTES NFR BLD: 1.21 K/UL (ref 1.1–3.7)
LYMPHOCYTES RELATIVE PERCENT: 6 % (ref 24–43)
MAGNESIUM SERPL-MCNC: 2.3 MG/DL (ref 1.6–2.6)
MCH RBC QN AUTO: 29 PG (ref 25.2–33.5)
MCHC RBC AUTO-ENTMCNC: 32.1 G/DL (ref 28.4–34.8)
MCV RBC AUTO: 90.3 FL (ref 82.6–102.9)
MONOCYTES NFR BLD: 0.8 K/UL (ref 0.1–1.2)
MONOCYTES NFR BLD: 4 % (ref 3–12)
MORPHOLOGY: ABNORMAL
NEGATIVE BASE EXCESS, ART: 3.6 MMOL/L (ref 0–2)
NEUTROPHILS NFR BLD: 80 % (ref 36–65)
NEUTS SEG NFR BLD: 16.08 K/UL (ref 1.5–8.1)
NRBC BLD-RTO: 0 PER 100 WBC
OSMOLALITY UR: 402 MOSM/KG (ref 80–1300)
PATIENT TEMP: 36.6
PHOSPHATE SERPL-MCNC: 4.6 MG/DL (ref 2.5–4.5)
PLATELET # BLD AUTO: 110 K/UL (ref 138–453)
PMV BLD AUTO: 10.9 FL (ref 8.1–13.5)
POC HCO3: 20.8 MMOL/L (ref 21–28)
POC LACTIC ACID: 1 MMOL/L (ref 0.56–1.39)
POC O2 SATURATION: 98.6 % (ref 94–98)
POC PCO2: 33.9 MM HG (ref 35–48)
POC PH: 7.39 (ref 7.35–7.45)
POC PO2: 115.5 MM HG (ref 83–108)
POTASSIUM SERPL-SCNC: 4.2 MMOL/L (ref 3.7–5.3)
POTASSIUM SERPL-SCNC: 4.9 MMOL/L (ref 3.7–5.3)
PROCALCITONIN SERPL-MCNC: 1.74 NG/ML
PROT SERPL-MCNC: 5.5 G/DL (ref 6.4–8.3)
RBC # BLD AUTO: 2.69 M/UL (ref 4.21–5.77)
SODIUM SERPL-SCNC: 132 MMOL/L (ref 135–144)
SODIUM SERPL-SCNC: 136 MMOL/L (ref 135–144)
SODIUM UR-SCNC: <20 MMOL/L
WBC OTHER # BLD: 20.1 K/UL (ref 3.5–11.3)

## 2024-01-07 PROCEDURE — 94003 VENT MGMT INPAT SUBQ DAY: CPT

## 2024-01-07 PROCEDURE — 6370000000 HC RX 637 (ALT 250 FOR IP): Performed by: NURSE PRACTITIONER

## 2024-01-07 PROCEDURE — 6360000002 HC RX W HCPCS: Performed by: NURSE PRACTITIONER

## 2024-01-07 PROCEDURE — 80076 HEPATIC FUNCTION PANEL: CPT

## 2024-01-07 PROCEDURE — 2700000000 HC OXYGEN THERAPY PER DAY

## 2024-01-07 PROCEDURE — 80048 BASIC METABOLIC PNL TOTAL CA: CPT

## 2024-01-07 PROCEDURE — A4216 STERILE WATER/SALINE, 10 ML: HCPCS | Performed by: NURSE PRACTITIONER

## 2024-01-07 PROCEDURE — 6370000000 HC RX 637 (ALT 250 FOR IP): Performed by: EMERGENCY MEDICINE

## 2024-01-07 PROCEDURE — 99233 SBSQ HOSP IP/OBS HIGH 50: CPT | Performed by: INTERNAL MEDICINE

## 2024-01-07 PROCEDURE — 82570 ASSAY OF URINE CREATININE: CPT

## 2024-01-07 PROCEDURE — 2580000003 HC RX 258: Performed by: NURSE PRACTITIONER

## 2024-01-07 PROCEDURE — 82803 BLOOD GASES ANY COMBINATION: CPT

## 2024-01-07 PROCEDURE — 6360000002 HC RX W HCPCS: Performed by: EMERGENCY MEDICINE

## 2024-01-07 PROCEDURE — 94761 N-INVAS EAR/PLS OXIMETRY MLT: CPT

## 2024-01-07 PROCEDURE — C9113 INJ PANTOPRAZOLE SODIUM, VIA: HCPCS | Performed by: NURSE PRACTITIONER

## 2024-01-07 PROCEDURE — 84300 ASSAY OF URINE SODIUM: CPT

## 2024-01-07 PROCEDURE — 85014 HEMATOCRIT: CPT

## 2024-01-07 PROCEDURE — 6360000002 HC RX W HCPCS: Performed by: STUDENT IN AN ORGANIZED HEALTH CARE EDUCATION/TRAINING PROGRAM

## 2024-01-07 PROCEDURE — 83735 ASSAY OF MAGNESIUM: CPT

## 2024-01-07 PROCEDURE — 36430 TRANSFUSION BLD/BLD COMPNT: CPT

## 2024-01-07 PROCEDURE — 82330 ASSAY OF CALCIUM: CPT

## 2024-01-07 PROCEDURE — P9016 RBC LEUKOCYTES REDUCED: HCPCS

## 2024-01-07 PROCEDURE — 84145 PROCALCITONIN (PCT): CPT

## 2024-01-07 PROCEDURE — 37799 UNLISTED PX VASCULAR SURGERY: CPT

## 2024-01-07 PROCEDURE — 71250 CT THORAX DX C-: CPT

## 2024-01-07 PROCEDURE — 2000000000 HC ICU R&B

## 2024-01-07 PROCEDURE — 6360000004 HC RX CONTRAST MEDICATION: Performed by: EMERGENCY MEDICINE

## 2024-01-07 PROCEDURE — 83605 ASSAY OF LACTIC ACID: CPT

## 2024-01-07 PROCEDURE — 86901 BLOOD TYPING SEROLOGIC RH(D): CPT

## 2024-01-07 PROCEDURE — 86900 BLOOD TYPING SEROLOGIC ABO: CPT

## 2024-01-07 PROCEDURE — 71045 X-RAY EXAM CHEST 1 VIEW: CPT

## 2024-01-07 PROCEDURE — 84100 ASSAY OF PHOSPHORUS: CPT

## 2024-01-07 PROCEDURE — 86850 RBC ANTIBODY SCREEN: CPT

## 2024-01-07 PROCEDURE — P9041 ALBUMIN (HUMAN),5%, 50ML: HCPCS | Performed by: STUDENT IN AN ORGANIZED HEALTH CARE EDUCATION/TRAINING PROGRAM

## 2024-01-07 PROCEDURE — 2500000003 HC RX 250 WO HCPCS

## 2024-01-07 PROCEDURE — 85025 COMPLETE CBC W/AUTO DIFF WBC: CPT

## 2024-01-07 PROCEDURE — 2580000003 HC RX 258

## 2024-01-07 PROCEDURE — 2580000003 HC RX 258: Performed by: EMERGENCY MEDICINE

## 2024-01-07 PROCEDURE — 82947 ASSAY GLUCOSE BLOOD QUANT: CPT

## 2024-01-07 PROCEDURE — 83935 ASSAY OF URINE OSMOLALITY: CPT

## 2024-01-07 PROCEDURE — 6360000002 HC RX W HCPCS

## 2024-01-07 PROCEDURE — 85018 HEMOGLOBIN: CPT

## 2024-01-07 PROCEDURE — 99291 CRITICAL CARE FIRST HOUR: CPT | Performed by: SURGERY

## 2024-01-07 PROCEDURE — 86920 COMPATIBILITY TEST SPIN: CPT

## 2024-01-07 RX ORDER — DEXTROSE MONOHYDRATE 100 MG/ML
INJECTION, SOLUTION INTRAVENOUS CONTINUOUS PRN
Status: DISCONTINUED | OUTPATIENT
Start: 2024-01-07 | End: 2024-01-08

## 2024-01-07 RX ORDER — SODIUM CHLORIDE 9 MG/ML
INJECTION, SOLUTION INTRAVENOUS PRN
Status: DISCONTINUED | OUTPATIENT
Start: 2024-01-07 | End: 2024-01-08

## 2024-01-07 RX ORDER — ALBUMIN, HUMAN INJ 5% 5 %
25 SOLUTION INTRAVENOUS ONCE
Status: COMPLETED | OUTPATIENT
Start: 2024-01-07 | End: 2024-01-07

## 2024-01-07 RX ORDER — MIDAZOLAM HYDROCHLORIDE 2 MG/2ML
2 INJECTION, SOLUTION INTRAMUSCULAR; INTRAVENOUS ONCE
Status: COMPLETED | OUTPATIENT
Start: 2024-01-07 | End: 2024-01-07

## 2024-01-07 RX ADMIN — PROPOFOL 40 MCG/KG/MIN: 10 INJECTION, EMULSION INTRAVENOUS at 19:06

## 2024-01-07 RX ADMIN — MEROPENEM 1000 MG: 1 INJECTION, POWDER, FOR SOLUTION INTRAVENOUS at 21:26

## 2024-01-07 RX ADMIN — SENNOSIDES 8.8 MG: 8.8 LIQUID ORAL at 20:29

## 2024-01-07 RX ADMIN — PROPOFOL 40 MCG/KG/MIN: 10 INJECTION, EMULSION INTRAVENOUS at 14:00

## 2024-01-07 RX ADMIN — PROPOFOL 40 MCG/KG/MIN: 10 INJECTION, EMULSION INTRAVENOUS at 21:45

## 2024-01-07 RX ADMIN — VASOPRESSIN 0.03 UNITS/MIN: 20 INJECTION, SOLUTION INTRAVENOUS at 03:33

## 2024-01-07 RX ADMIN — ACETAMINOPHEN 1000 MG: 650 SOLUTION ORAL at 06:13

## 2024-01-07 RX ADMIN — PROPOFOL 40 MCG/KG/MIN: 10 INJECTION, EMULSION INTRAVENOUS at 04:24

## 2024-01-07 RX ADMIN — PROPOFOL 40 MCG/KG/MIN: 10 INJECTION, EMULSION INTRAVENOUS at 08:26

## 2024-01-07 RX ADMIN — Medication 300 MCG/HR: at 16:34

## 2024-01-07 RX ADMIN — SODIUM CHLORIDE 3.72 UNITS/HR: 9 INJECTION, SOLUTION INTRAVENOUS at 12:51

## 2024-01-07 RX ADMIN — HEPARIN SODIUM 5000 UNITS: 5000 INJECTION INTRAVENOUS; SUBCUTANEOUS at 06:13

## 2024-01-07 RX ADMIN — Medication 275 MCG/HR: at 08:04

## 2024-01-07 RX ADMIN — SODIUM CHLORIDE, PRESERVATIVE FREE 10 ML: 5 INJECTION INTRAVENOUS at 08:05

## 2024-01-07 RX ADMIN — PROPOFOL 40 MCG/KG/MIN: 10 INJECTION, EMULSION INTRAVENOUS at 16:28

## 2024-01-07 RX ADMIN — PANTOPRAZOLE SODIUM 40 MG: 40 INJECTION, POWDER, FOR SOLUTION INTRAVENOUS at 20:29

## 2024-01-07 RX ADMIN — INSULIN LISPRO 4 UNITS: 100 INJECTION, SOLUTION INTRAVENOUS; SUBCUTANEOUS at 11:13

## 2024-01-07 RX ADMIN — MIDAZOLAM HYDROCHLORIDE 2 MG: 1 INJECTION, SOLUTION INTRAMUSCULAR; INTRAVENOUS at 04:35

## 2024-01-07 RX ADMIN — VASOPRESSIN 0.03 UNITS/MIN: 20 INJECTION, SOLUTION INTRAVENOUS at 15:58

## 2024-01-07 RX ADMIN — MEROPENEM 1000 MG: 1 INJECTION, POWDER, FOR SOLUTION INTRAVENOUS at 12:55

## 2024-01-07 RX ADMIN — HEPARIN SODIUM 5000 UNITS: 5000 INJECTION INTRAVENOUS; SUBCUTANEOUS at 14:54

## 2024-01-07 RX ADMIN — SODIUM CHLORIDE, PRESERVATIVE FREE 10 ML: 5 INJECTION INTRAVENOUS at 20:29

## 2024-01-07 RX ADMIN — INSULIN LISPRO 8 UNITS: 100 INJECTION, SOLUTION INTRAVENOUS; SUBCUTANEOUS at 08:02

## 2024-01-07 RX ADMIN — POTASSIUM CHLORIDE: 2 INJECTION, SOLUTION, CONCENTRATE INTRAVENOUS at 18:02

## 2024-01-07 RX ADMIN — PROPOFOL 40 MCG/KG/MIN: 10 INJECTION, EMULSION INTRAVENOUS at 11:13

## 2024-01-07 RX ADMIN — HEPARIN SODIUM 5000 UNITS: 5000 INJECTION INTRAVENOUS; SUBCUTANEOUS at 22:13

## 2024-01-07 RX ADMIN — DOCUSATE SODIUM LIQUID 100 MG: 100 LIQUID ORAL at 20:29

## 2024-01-07 RX ADMIN — MICONAZOLE NITRATE: 20 CREAM TOPICAL at 08:05

## 2024-01-07 RX ADMIN — PROPOFOL 40 MCG/KG/MIN: 10 INJECTION, EMULSION INTRAVENOUS at 06:27

## 2024-01-07 RX ADMIN — MEROPENEM 1000 MG: 1 INJECTION, POWDER, FOR SOLUTION INTRAVENOUS at 06:07

## 2024-01-07 RX ADMIN — ALBUMIN (HUMAN) 25 G: 12.5 INJECTION, SOLUTION INTRAVENOUS at 20:21

## 2024-01-07 RX ADMIN — LEVOTHYROXINE SODIUM 200 MCG: 100 TABLET ORAL at 06:14

## 2024-01-07 RX ADMIN — PANTOPRAZOLE SODIUM 40 MG: 40 INJECTION, POWDER, FOR SOLUTION INTRAVENOUS at 09:58

## 2024-01-07 RX ADMIN — SENNOSIDES 8.8 MG: 8.8 LIQUID ORAL at 08:03

## 2024-01-07 RX ADMIN — ACETAMINOPHEN 1000 MG: 650 SOLUTION ORAL at 20:29

## 2024-01-07 RX ADMIN — ACETAMINOPHEN 1000 MG: 650 SOLUTION ORAL at 14:53

## 2024-01-07 RX ADMIN — IOHEXOL 50 ML: 240 INJECTION, SOLUTION INTRATHECAL; INTRAVASCULAR; INTRAVENOUS; ORAL at 12:40

## 2024-01-07 RX ADMIN — DOCUSATE SODIUM LIQUID 100 MG: 100 LIQUID ORAL at 08:03

## 2024-01-07 RX ADMIN — MICONAZOLE NITRATE: 20 CREAM TOPICAL at 20:30

## 2024-01-07 RX ADMIN — ASPIRIN 81 MG 81 MG: 81 TABLET ORAL at 08:03

## 2024-01-07 ASSESSMENT — PULMONARY FUNCTION TESTS
PIF_VALUE: 25
PIF_VALUE: 26
PIF_VALUE: 24
PIF_VALUE: 25
PIF_VALUE: 24
PIF_VALUE: 26
PIF_VALUE: 25
PIF_VALUE: 24
PIF_VALUE: 24
PIF_VALUE: 26
PIF_VALUE: 25
PIF_VALUE: 24
PIF_VALUE: 24

## 2024-01-07 NOTE — PROGRESS NOTES
[facility] -   Infectious diseases evaluation  admission date 1/1/2024    reason for consultation:   Necrotizing Faciitis    Impression :   Current:  1/1/24 Necrotizing faciitis 2/2 Polymicrobial infection with T2DM and PEG tube dislodgement and infected   LEONARDA on CKD   1/2/24 S/p partial wedge gastrectomy due to infected PEG tube and dislodgement   1/2/24 S/p debridement of abdomen and indwelling mesh removal,  due to concern for necrotizing fasciitis, wound vac in place  1/3/24 GASTROSTOMY TUBE PLACEMENT, REPAIR OF LARGE VENTRAL DEFECT   Alcoholic Liver cirrhosis - found intra op  Elevated CRP  Lactic acidosis- initial 4.1 now 6.2  Bandemia leukemoid reaction- WBC 15 to 38 to 27 -45  Proteus UTI  RLL pseudomonas pneumonia     Other:    Discussion / summary of stay / plan of care   Monitor platelets  Consider dapto if plts drop  Defer vanco due to renal issues  Respiratory culture: pseudomonas multiS  U cx proteus multiS  Bcx pending - SCN x 1  is a contamination  MRSA swab and COVID pending   No abd wound cx taken  Leukemoid reaction persistent  Recommendations     Continue meropenem  Wound care      Infection Control Recommendations   [place] Precautions  Contact Isolation MRSA      Antimicrobial Stewardship Recommendations   Simplification of therapy  Targeted therapy    History of Present Illness:   Initial history:  Ruben Dimas is a 58 y.o.-year-old male presents from extended-care facility via EMS for complaint of chest pain/abdominal pain.  Patient has a history of type 2 diabetes melitis, hypertension, hyperlipidemia, hypothyroidism, heart failure with preserved ejection fraction, PEG tube, liver cirrhosis, chronic trach secondary to chronic respiratory failure, A-fib, chronic urinary cath, obesity and bilateral lower extremity lymphedema. Recent hospitalization in December 2023.   On 1/1/2023 patient was brought by EMS where he was given 10 mg of Cardizem  membranes are moist.   Eyes:      General: No scleral icterus.     Extraocular Movements: Extraocular movements intact.      Conjunctiva/sclera: Conjunctivae normal.   Cardiovascular:      Rate and Rhythm: Tachycardia present.      Heart sounds: No murmur heard.     No friction rub. No gallop.   Pulmonary:      Breath sounds: No stridor. No rhonchi.      Comments: trached  Abdominal:      Comments: Large surgical wound open with wound vac in place over majority of abdomen    Genitourinary:     Comments: Clemons in place  Musculoskeletal:         General: No swelling.      Cervical back: Neck supple. No rigidity.      Right lower leg: Edema present.      Left lower leg: Edema present.   Skin:     Coloration: Skin is not jaundiced.      Findings: No bruising.      Comments: Cool, dry    Neurological:      Comments: Trached and sleepy   Psychiatric:      Comments: Trached and sleepy          Past Medical History:     Past Medical History:   Diagnosis Date    A-fib (HCC) 4/18/2023    Anxiety and depression     Back pain, chronic     BPH (benign prostatic hyperplasia)     Cellulitis of left lower extremity 08/21/2017    Cellulitis of right lower extremity 08/19/2017    CHF (congestive heart failure) (HCC)     Chronic respiratory failure with hypoxia (HCC)     prn    Cirrhosis (HCC)     Diabetes mellitus (HCC)     not checking bloodsugar at home    Epididymoorchitis     GERD (gastroesophageal reflux disease)     H/O cardiac catheterization not sure of date    no stents    Hepatitis     Pt does not know the type.     Hiatal hernia     History of alcohol abuse     Sober since 2013    Hyperlipidemia     not taking any medication    Hypertension     IBS (irritable bowel syndrome)     Incisional hernia with obstruction but no gangrene 05/20/2018    Klebsiella sepsis (HCC) 10/14/2021    Metabolic encephalopathy     Morbid obesity (HCC)     Neuropathy     feet,toes    On home oxygen therapy     DME for home oxgygen at 2.5 lpm at

## 2024-01-07 NOTE — CONSENT
Informed Consent for Blood Component Transfusion Note    I have discussed with the son the rationale for blood component transfusion; its benefits in treating or preventing fatigue, organ damage, or death; and its risk which includes mild transfusion reactions, rare risk of blood borne infection, or more serious but rare reactions. I have discussed the alternatives to transfusion, including the risk and consequences of not receiving transfusion. The son had an opportunity to ask questions and had agreed to proceed with transfusion of blood components.    Electronically signed by Domenica Horton DO on 1/7/24 at 1:37 PM EST

## 2024-01-07 NOTE — PROGRESS NOTES
ICU PROGRESS NOTE        PATIENT NAME: Ruben Dimas  MEDICAL RECORD NO. [id number]  DATE: 1/7/2024    HD: # 6    ASSESSMENT    Patient Active Problem List   Diagnosis    Alcoholic cirrhosis of liver (HCC), sober since 2013    Peripheral edema    Bipolar disorder (HCC)    Type 2 diabetes mellitus with diabetic neuropathy, with long-term current use of insulin (HCC)    Essential hypertension, currently hypotensive    Dyslipidemia    Weakness    Acute metabolic encephalopathy    FABI (obstructive sleep apnea)    Primary osteoarthritis of right knee    Type 2 diabetes mellitus with diabetic neuropathy (HCC)    Acquired hypothyroidism    Urine retention    Anemia    Slow transit constipation    Iron deficiency anemia due to chronic blood loss    Gastroesophageal reflux disease without esophagitis    LEONARDA (acute kidney injury) (HCC)    Secondary esophageal varices without bleeding (HCC)    Portal hypertension (HCC)    Obesity, morbid, BMI 50 or higher (HCC)    Venous stasis dermatitis of both lower extremities    Cellulitis of perineum    Chronic indwelling Clemons catheter    Anxiety and depression    Back pain, chronic    BPH (benign prostatic hyperplasia)    Chronic diastolic CHF (congestive heart failure) (HCC)    Cirrhosis (HCC)    Diabetes mellitus (HCC)    GERD (gastroesophageal reflux disease)    Hepatitis    Hiatal hernia    Hypertension, essential    IBS (irritable bowel syndrome)    Morbid obesity with BMI of 45.0-49.9, adult (HCC)    Neuropathy    Chronic respiratory failure with hypoxia, on home oxygen therapy (HCC)    Sleep apnea    Thyroid disease    Urinary bladder neurogenic dysfunction    Acute UTI    Musculoskeletal immobility    Pyocystis    Myoclonic jerking    Thrombocytopenia (HCC)    Hypotension    Sepsis with acute hypercapnic respiratory failure and septic shock (HCC)    Right lower lobe pneumonia    Acute kidney injury superimposed on CKD (HCC)    Somnolence    Acute respiratory acidosis (HCC)

## 2024-01-07 NOTE — PROGRESS NOTES
[facility] -   Infectious diseases evaluation  admission date 1/1/2024    reason for consultation:   Necrotizing Faciitis    Impression :   Current:  1/1/24 Necrotizing faciitis 2/2 Polymicrobial infection with T2DM and PEG tube dislodgement and infected   LEONARDA on CKD   1/2/24 S/p partial wedge gastrectomy due to infected PEG tube and dislodgement   1/2/24 S/p debridement of abdomen and indwelling mesh removal,  due to concern for necrotizing fasciitis, wound vac in place  1/3/24 GASTROSTOMY TUBE PLACEMENT, REPAIR OF LARGE VENTRAL DEFECT   Alcoholic Liver cirrhosis - found intra op  Elevated CRP  Lactic acidosis- initial 4.1 now 6.2  Bandemia leukemoid reaction- WBC 15 to 38 to 27 -45  Proteus UTI  RLL pseudomonas pneumonia       Discussion / summary of stay / plan of care   Monitor platelets  Consider dapto if plts drop  Defer vanco due to renal issues  Respiratory culture: pseudomonas multiS  U cx proteus multiS  Bcx pending - SCN x 1  is a contamination  MRSA swab and COVID pending   No abd wound cx taken  Leukemoid reaction persistent  Recommendations     Continue meropenem. Stop date 1-8-24  Wound care      Infection Control Recommendations   [place] Precautions  Contact Isolation MRSA      Antimicrobial Stewardship Recommendations   Simplification of therapy  Targeted therapy    History of Present Illness:   Initial history:  Ruben Dimas is a 58 y.o.-year-old male presents from extended-care facility via EMS for complaint of chest pain/abdominal pain.  Patient has a history of type 2 diabetes melitis, hypertension, hyperlipidemia, hypothyroidism, heart failure with preserved ejection fraction, PEG tube, liver cirrhosis, chronic trach secondary to chronic respiratory failure, A-fib, chronic urinary cath, obesity and bilateral lower extremity lymphedema. Recent hospitalization in December 2023.   On 1/1/2023 patient was brought by EMS where he was given 10 mg of  propofol and fentanyl  Fi02 30  PEEP 8  RR 21-->16  02 sat 98-->100    Residual pleural effusions by CXR    S/P abdominal wash out and closure with VAC on 1-3-24 and 1-5-24  No new issues per RN    Medications reviewed:  On meropenem. Stop date 1-8-24    Summary of relevant labs: 1/7/2024   Labs:  WBC 38 - 27 - 45 - 43  Plts 171 - 126  Lactic 6    Creat 1.6 -    Micro:  BC SCN x 1  - contamination  Sp cx pseudomonas multi S  FLU A/B negative  Nasal MRSA +  Covid  BC1/1/24 CNS contamination  U cx  proteus S all        Imaging:  CTAP 1/1/24  1.  Malposition gastrostomy tube, retracted into the abdominal wall.  Associated extensive subcutaneous emphysema is noted.     2.  No evidence for acute pulmonary embolism, within the limitations of  motion artifact.     3.  Trace pleural effusions and dependent atelectasis, right greater than  left.     4.  Punctate bilateral nephrolithiasis.    I have personally reviewed the past medical history, past surgical history, medications, social history, and family history, and I haveupdated the database accordingly.      Allergies:   No known allergies     Review of Systems:     Review of Systems   Reason unable to perform ROS: trached.       Physical Examination :     Physical Exam  Constitutional:       General: He is not in acute distress.     Appearance: He is obese. He is ill-appearing. He is not toxic-appearing or diaphoretic.   HENT:      Head: Normocephalic and atraumatic.      Nose: Nose normal.      Mouth/Throat:      Mouth: Mucous membranes are moist.   Eyes:      General: No scleral icterus.     Extraocular Movements: Extraocular movements intact.      Conjunctiva/sclera: Conjunctivae normal.   Cardiovascular:      Rate and Rhythm: Tachycardia present.      Heart sounds: No murmur heard.     No friction rub. No gallop.   Pulmonary:      Breath sounds: No stridor. No rhonchi.      Comments: trached  Abdominal:      Comments: Large surgical wound open with wound vac

## 2024-01-07 NOTE — PROGRESS NOTES
01/07/24 1433   Patient Transport   Time Spent Transporting 46-60   Transport Ventillation Type Transport vent   Transport From ICU   Transport Destination CT scan   Emergency Equipment Included Yes         The transport originated from CAR[facility], room . Pt. was transported to CT-scan. Assisting with the transport was RN.  Appropriate devices were applied to monitor the patient's condition during transport. Patient transported  via 30% O2 via ventilator. Patient tolerated well.        MATTHEW MEYER RCP  2:35 PM

## 2024-01-07 NOTE — PROGRESS NOTES
Comprehensive Nutrition Assessment    Type and Reason for Visit:  Reassess    Nutrition Recommendations/Plan:   TPN to continue, reduce K+ in next bag. Discontinue lipids with current rate of propofol, will monitor triglycerides.   If desired to start TF, suggest Immune-enhancing at 10 mL/hr, consult for goal rate recommendation.        Malnutrition Assessment:  Malnutrition Status:  Insufficient data (01/03/24 1416)    Context:  Acute Illness     Findings of the 6 clinical characteristics of malnutrition:  Energy Intake:  Mild decrease in energy intake (Comment)  Weight Loss:  Greater than 7.5% over 3 months     Body Fat Loss:  Unable to assess     Muscle Mass Loss:  Unable to assess    Fluid Accumulation:  Moderate to Severe Generalized   Strength:  Not Performed    Nutrition Assessment:    Pt intubated via trach, propofol at 39 mL/hr (1030 kcal). Pt +NGT with bloody output per RN. No BM noted, hypoactive bowel sounds. +2 pitting generalized/BLE, +2 BUE edema noted. Labs reviewed K+ 4.9 mmol/L, Phos 4.6 mg/dL.    Nutrition Related Findings:    labs/meds reviewed Wound Type: Surgical Incision, Wound Vac (to abdomen)       Current Nutrition Intake & Therapies:    Average Meal Intake: NPO  Average Supplements Intake: NPO  Diet NPO Exceptions are: Sips of Water with Meds  PN-Adult 2-in-1 Central Line (Standard)  Current Parenteral Nutrition Orders:  Type and Formula: 2-in-1 Standard   Lipids: Other (Comment) (discontinue with propofol)  Duration: Continuous  Rate/Volume: 56.7  Current PN Order Provides: 1080 kcal, 100 g PRO (plus propofol kcal)      Anthropometric Measures:  Height: 177.8 cm (5' 10\")  Ideal Body Weight (IBW): 166 lbs (75 kg)    Admission Body Weight: 156.9 kg (345 lb 14.4 oz)  Current Body Weight: 162.4 kg (358 lb 0.4 oz) (1/5/24), 215.7 % IBW. Weight Source: Bed Scale  Current BMI (kg/m2): 51.4  Usual Body Weight: 192 kg (423 lb 5 oz) (10/19/23 bed scale per chart review)  % Weight Change

## 2024-01-08 ENCOUNTER — APPOINTMENT (OUTPATIENT)
Dept: GENERAL RADIOLOGY | Age: 59
DRG: 853 | End: 2024-01-08
Payer: MEDICARE

## 2024-01-08 ENCOUNTER — ANESTHESIA EVENT (OUTPATIENT)
Dept: OPERATING ROOM | Age: 59
End: 2024-01-08
Payer: MEDICARE

## 2024-01-08 ENCOUNTER — ANESTHESIA (OUTPATIENT)
Dept: OPERATING ROOM | Age: 59
End: 2024-01-08
Payer: MEDICARE

## 2024-01-08 LAB
ABO/RH: NORMAL
ALBUMIN SERPL-MCNC: 2.6 G/DL (ref 3.5–5.2)
ALBUMIN/GLOB SERPL: 1 {RATIO} (ref 1–2.5)
ALLEN TEST: ABNORMAL
ALP SERPL-CCNC: 200 U/L (ref 40–129)
ALT SERPL-CCNC: 15 U/L (ref 5–41)
ANION GAP SERPL CALCULATED.3IONS-SCNC: 13 MMOL/L (ref 9–17)
ANION GAP SERPL CALCULATED.3IONS-SCNC: 14 MMOL/L (ref 9–17)
ANTIBODY SCREEN: NEGATIVE
ARM BAND NUMBER: NORMAL
AST SERPL-CCNC: 23 U/L
BASOPHILS # BLD: 0 K/UL (ref 0–0.2)
BASOPHILS # BLD: 0.15 K/UL (ref 0–0.2)
BASOPHILS NFR BLD: 0 % (ref 0–2)
BASOPHILS NFR BLD: 1 % (ref 0–2)
BILIRUB DIRECT SERPL-MCNC: 0.8 MG/DL
BILIRUB INDIRECT SERPL-MCNC: 0.4 MG/DL (ref 0–1)
BILIRUB SERPL-MCNC: 1.2 MG/DL (ref 0.3–1.2)
BLOOD BANK BLOOD PRODUCT EXPIRATION DATE: NORMAL
BLOOD BANK DISPENSE STATUS: NORMAL
BLOOD BANK ISBT PRODUCT BLOOD TYPE: 6200
BLOOD BANK PRODUCT CODE: NORMAL
BLOOD BANK SAMPLE EXPIRATION: NORMAL
BLOOD BANK UNIT TYPE AND RH: NORMAL
BPU ID: NORMAL
BUN SERPL-MCNC: 35 MG/DL (ref 6–20)
BUN SERPL-MCNC: 36 MG/DL (ref 6–20)
CA-I BLD-SCNC: 1.26 MMOL/L (ref 1.13–1.33)
CA-I BLD-SCNC: 1.26 MMOL/L (ref 1.13–1.33)
CALCIUM SERPL-MCNC: 8.5 MG/DL (ref 8.6–10.4)
CALCIUM SERPL-MCNC: 8.7 MG/DL (ref 8.6–10.4)
CHLORIDE SERPL-SCNC: 105 MMOL/L (ref 98–107)
CHLORIDE SERPL-SCNC: 108 MMOL/L (ref 98–107)
CO2 SERPL-SCNC: 17 MMOL/L (ref 20–31)
CO2 SERPL-SCNC: 17 MMOL/L (ref 20–31)
COMPONENT: NORMAL
CREAT SERPL-MCNC: 0.9 MG/DL (ref 0.7–1.2)
CREAT SERPL-MCNC: 1 MG/DL (ref 0.7–1.2)
CROSSMATCH RESULT: NORMAL
EOSINOPHIL # BLD: 1.02 K/UL (ref 0–0.44)
EOSINOPHIL # BLD: 1.61 K/UL (ref 0–0.4)
EOSINOPHILS RELATIVE PERCENT: 10 % (ref 1–4)
EOSINOPHILS RELATIVE PERCENT: 7 % (ref 1–4)
ERYTHROCYTE [DISTWIDTH] IN BLOOD BY AUTOMATED COUNT: 18.4 % (ref 11.8–14.4)
ERYTHROCYTE [DISTWIDTH] IN BLOOD BY AUTOMATED COUNT: 18.7 % (ref 11.8–14.4)
FIO2: 30
GFR SERPL CREATININE-BSD FRML MDRD: >60 ML/MIN/1.73M2
GFR SERPL CREATININE-BSD FRML MDRD: >60 ML/MIN/1.73M2
GLUCOSE BLD-MCNC: 138 MG/DL (ref 75–110)
GLUCOSE BLD-MCNC: 139 MG/DL (ref 75–110)
GLUCOSE BLD-MCNC: 141 MG/DL (ref 75–110)
GLUCOSE BLD-MCNC: 142 MG/DL (ref 75–110)
GLUCOSE BLD-MCNC: 143 MG/DL (ref 75–110)
GLUCOSE BLD-MCNC: 143 MG/DL (ref 75–110)
GLUCOSE BLD-MCNC: 145 MG/DL (ref 75–110)
GLUCOSE BLD-MCNC: 145 MG/DL (ref 75–110)
GLUCOSE BLD-MCNC: 151 MG/DL (ref 75–110)
GLUCOSE BLD-MCNC: 153 MG/DL (ref 74–100)
GLUCOSE BLD-MCNC: 155 MG/DL (ref 75–110)
GLUCOSE BLD-MCNC: 165 MG/DL (ref 75–110)
GLUCOSE BLD-MCNC: 166 MG/DL (ref 75–110)
GLUCOSE BLD-MCNC: 170 MG/DL (ref 75–110)
GLUCOSE BLD-MCNC: 178 MG/DL (ref 75–110)
GLUCOSE BLD-MCNC: 185 MG/DL (ref 75–110)
GLUCOSE BLD-MCNC: 203 MG/DL (ref 75–110)
GLUCOSE SERPL-MCNC: 159 MG/DL (ref 70–99)
GLUCOSE SERPL-MCNC: 160 MG/DL (ref 70–99)
HCT VFR BLD AUTO: 27.6 % (ref 40.7–50.3)
HCT VFR BLD AUTO: 28 % (ref 40.7–50.3)
HGB BLD-MCNC: 8.7 G/DL (ref 13–17)
HGB BLD-MCNC: 8.9 G/DL (ref 13–17)
IMM GRANULOCYTES # BLD AUTO: 0.97 K/UL (ref 0–0.3)
IMM GRANULOCYTES # BLD AUTO: 2.03 K/UL (ref 0–0.3)
IMM GRANULOCYTES NFR BLD: 14 %
IMM GRANULOCYTES NFR BLD: 6 %
INR PPP: 1
LYMPHOCYTES NFR BLD: 0.87 K/UL (ref 1.1–3.7)
LYMPHOCYTES NFR BLD: 0.97 K/UL (ref 1–4.8)
LYMPHOCYTES RELATIVE PERCENT: 6 % (ref 24–43)
LYMPHOCYTES RELATIVE PERCENT: 6 % (ref 24–44)
MAGNESIUM SERPL-MCNC: 2.3 MG/DL (ref 1.6–2.6)
MAGNESIUM SERPL-MCNC: 2.3 MG/DL (ref 1.6–2.6)
MCH RBC QN AUTO: 29.2 PG (ref 25.2–33.5)
MCH RBC QN AUTO: 29.5 PG (ref 25.2–33.5)
MCHC RBC AUTO-ENTMCNC: 31.5 G/DL (ref 28.4–34.8)
MCHC RBC AUTO-ENTMCNC: 31.8 G/DL (ref 28.4–34.8)
MCV RBC AUTO: 92.6 FL (ref 82.6–102.9)
MCV RBC AUTO: 92.7 FL (ref 82.6–102.9)
MONOCYTES NFR BLD: 0.81 K/UL (ref 0.1–0.8)
MONOCYTES NFR BLD: 0.87 K/UL (ref 0.1–1.2)
MONOCYTES NFR BLD: 5 % (ref 1–7)
MONOCYTES NFR BLD: 6 % (ref 3–12)
MORPHOLOGY: ABNORMAL
MORPHOLOGY: ABNORMAL
NEGATIVE BASE EXCESS, ART: 5.7 MMOL/L (ref 0–2)
NEUTROPHILS NFR BLD: 66 % (ref 36–65)
NEUTROPHILS NFR BLD: 73 % (ref 36–66)
NEUTS SEG NFR BLD: 11.74 K/UL (ref 1.8–7.7)
NEUTS SEG NFR BLD: 9.56 K/UL (ref 1.5–8.1)
NRBC BLD-RTO: 0 PER 100 WBC
NRBC BLD-RTO: 0 PER 100 WBC
PHOSPHATE SERPL-MCNC: 4 MG/DL (ref 2.5–4.5)
PHOSPHATE SERPL-MCNC: 4.2 MG/DL (ref 2.5–4.5)
PLATELET # BLD AUTO: 90 K/UL (ref 138–453)
PLATELET # BLD AUTO: 93 K/UL (ref 138–453)
PMV BLD AUTO: 9.7 FL (ref 8.1–13.5)
PMV BLD AUTO: 9.7 FL (ref 8.1–13.5)
POC HCO3: 20.1 MMOL/L (ref 21–28)
POC O2 SATURATION: 96.4 % (ref 94–98)
POC PCO2: 39.8 MM HG (ref 35–48)
POC PH: 7.31 (ref 7.35–7.45)
POC PO2: 92.6 MM HG (ref 83–108)
POTASSIUM SERPL-SCNC: 3.8 MMOL/L (ref 3.7–5.3)
POTASSIUM SERPL-SCNC: 3.9 MMOL/L (ref 3.7–5.3)
PROCALCITONIN SERPL-MCNC: 1.45 NG/ML
PROT SERPL-MCNC: 5.3 G/DL (ref 6.4–8.3)
PROTHROMBIN TIME: 13.3 SEC (ref 11.7–14.9)
RBC # BLD AUTO: 2.98 M/UL (ref 4.21–5.77)
RBC # BLD AUTO: 3.02 M/UL (ref 4.21–5.77)
SAMPLE SITE: ABNORMAL
SODIUM SERPL-SCNC: 136 MMOL/L (ref 135–144)
SODIUM SERPL-SCNC: 138 MMOL/L (ref 135–144)
TRANSFUSION STATUS: NORMAL
TRIGL SERPL-MCNC: 149 MG/DL
UNIT DIVISION: 0
UNIT ISSUE DATE/TIME: NORMAL
WBC OTHER # BLD: 14.5 K/UL (ref 3.5–11.3)
WBC OTHER # BLD: 16.1 K/UL (ref 3.5–11.3)

## 2024-01-08 PROCEDURE — 2500000003 HC RX 250 WO HCPCS: Performed by: STUDENT IN AN ORGANIZED HEALTH CARE EDUCATION/TRAINING PROGRAM

## 2024-01-08 PROCEDURE — 82803 BLOOD GASES ANY COMBINATION: CPT

## 2024-01-08 PROCEDURE — 97606 NEG PRS WND THER DME>50 SQCM: CPT | Performed by: SURGERY

## 2024-01-08 PROCEDURE — 85025 COMPLETE CBC W/AUTO DIFF WBC: CPT

## 2024-01-08 PROCEDURE — 80076 HEPATIC FUNCTION PANEL: CPT

## 2024-01-08 PROCEDURE — 85610 PROTHROMBIN TIME: CPT

## 2024-01-08 PROCEDURE — 2580000003 HC RX 258: Performed by: NURSE PRACTITIONER

## 2024-01-08 PROCEDURE — 94761 N-INVAS EAR/PLS OXIMETRY MLT: CPT

## 2024-01-08 PROCEDURE — 2500000003 HC RX 250 WO HCPCS

## 2024-01-08 PROCEDURE — 6360000002 HC RX W HCPCS: Performed by: STUDENT IN AN ORGANIZED HEALTH CARE EDUCATION/TRAINING PROGRAM

## 2024-01-08 PROCEDURE — 36415 COLL VENOUS BLD VENIPUNCTURE: CPT

## 2024-01-08 PROCEDURE — 15002 WOUND PREP TRK/ARM/LEG: CPT | Performed by: SURGERY

## 2024-01-08 PROCEDURE — 3700000000 HC ANESTHESIA ATTENDED CARE: Performed by: SURGERY

## 2024-01-08 PROCEDURE — 2580000003 HC RX 258: Performed by: STUDENT IN AN ORGANIZED HEALTH CARE EDUCATION/TRAINING PROGRAM

## 2024-01-08 PROCEDURE — 2720000010 HC SURG SUPPLY STERILE: Performed by: SURGERY

## 2024-01-08 PROCEDURE — P9041 ALBUMIN (HUMAN),5%, 50ML: HCPCS | Performed by: EMERGENCY MEDICINE

## 2024-01-08 PROCEDURE — A4216 STERILE WATER/SALINE, 10 ML: HCPCS | Performed by: NURSE PRACTITIONER

## 2024-01-08 PROCEDURE — 99211 OFF/OP EST MAY X REQ PHY/QHP: CPT

## 2024-01-08 PROCEDURE — 84100 ASSAY OF PHOSPHORUS: CPT

## 2024-01-08 PROCEDURE — 6370000000 HC RX 637 (ALT 250 FOR IP): Performed by: NURSE PRACTITIONER

## 2024-01-08 PROCEDURE — 84145 PROCALCITONIN (PCT): CPT

## 2024-01-08 PROCEDURE — 2700000000 HC OXYGEN THERAPY PER DAY

## 2024-01-08 PROCEDURE — 71045 X-RAY EXAM CHEST 1 VIEW: CPT

## 2024-01-08 PROCEDURE — 3700000001 HC ADD 15 MINUTES (ANESTHESIA): Performed by: SURGERY

## 2024-01-08 PROCEDURE — 6360000002 HC RX W HCPCS: Performed by: EMERGENCY MEDICINE

## 2024-01-08 PROCEDURE — 99291 CRITICAL CARE FIRST HOUR: CPT | Performed by: SURGERY

## 2024-01-08 PROCEDURE — 80048 BASIC METABOLIC PNL TOTAL CA: CPT

## 2024-01-08 PROCEDURE — 3600000004 HC SURGERY LEVEL 4 BASE: Performed by: SURGERY

## 2024-01-08 PROCEDURE — 2000000000 HC ICU R&B

## 2024-01-08 PROCEDURE — 6370000000 HC RX 637 (ALT 250 FOR IP): Performed by: STUDENT IN AN ORGANIZED HEALTH CARE EDUCATION/TRAINING PROGRAM

## 2024-01-08 PROCEDURE — 2580000003 HC RX 258: Performed by: EMERGENCY MEDICINE

## 2024-01-08 PROCEDURE — C9113 INJ PANTOPRAZOLE SODIUM, VIA: HCPCS | Performed by: NURSE PRACTITIONER

## 2024-01-08 PROCEDURE — 2500000003 HC RX 250 WO HCPCS: Performed by: NURSE PRACTITIONER

## 2024-01-08 PROCEDURE — 84478 ASSAY OF TRIGLYCERIDES: CPT

## 2024-01-08 PROCEDURE — 83735 ASSAY OF MAGNESIUM: CPT

## 2024-01-08 PROCEDURE — 2580000003 HC RX 258

## 2024-01-08 PROCEDURE — 3600000014 HC SURGERY LEVEL 4 ADDTL 15MIN: Performed by: SURGERY

## 2024-01-08 PROCEDURE — 2709999900 HC NON-CHARGEABLE SUPPLY: Performed by: SURGERY

## 2024-01-08 PROCEDURE — 37799 UNLISTED PX VASCULAR SURGERY: CPT

## 2024-01-08 PROCEDURE — 6370000000 HC RX 637 (ALT 250 FOR IP): Performed by: EMERGENCY MEDICINE

## 2024-01-08 PROCEDURE — 6360000002 HC RX W HCPCS

## 2024-01-08 PROCEDURE — 6360000002 HC RX W HCPCS: Performed by: NURSE PRACTITIONER

## 2024-01-08 PROCEDURE — 2580000003 HC RX 258: Performed by: SURGERY

## 2024-01-08 PROCEDURE — 99233 SBSQ HOSP IP/OBS HIGH 50: CPT | Performed by: INTERNAL MEDICINE

## 2024-01-08 PROCEDURE — 82330 ASSAY OF CALCIUM: CPT

## 2024-01-08 PROCEDURE — 15003 WOUND PREP ADDL 100 CM: CPT | Performed by: SURGERY

## 2024-01-08 PROCEDURE — 94003 VENT MGMT INPAT SUBQ DAY: CPT

## 2024-01-08 PROCEDURE — 82947 ASSAY GLUCOSE BLOOD QUANT: CPT

## 2024-01-08 PROCEDURE — 0WUF0JZ SUPPLEMENT ABDOMINAL WALL WITH SYNTHETIC SUBSTITUTE, OPEN APPROACH: ICD-10-PCS | Performed by: SURGERY

## 2024-01-08 RX ORDER — MAGNESIUM HYDROXIDE 1200 MG/15ML
LIQUID ORAL CONTINUOUS PRN
Status: COMPLETED | OUTPATIENT
Start: 2024-01-08 | End: 2024-01-08

## 2024-01-08 RX ORDER — ROCURONIUM BROMIDE 10 MG/ML
INJECTION, SOLUTION INTRAVENOUS PRN
Status: DISCONTINUED | OUTPATIENT
Start: 2024-01-08 | End: 2024-01-08 | Stop reason: SDUPTHER

## 2024-01-08 RX ORDER — INSULIN GLARGINE 100 [IU]/ML
15 INJECTION, SOLUTION SUBCUTANEOUS 2 TIMES DAILY
Status: DISCONTINUED | OUTPATIENT
Start: 2024-01-08 | End: 2024-01-09

## 2024-01-08 RX ORDER — LANSOPRAZOLE 30 MG/1
30 TABLET, ORALLY DISINTEGRATING, DELAYED RELEASE ORAL
Status: DISCONTINUED | OUTPATIENT
Start: 2024-01-08 | End: 2024-01-31 | Stop reason: HOSPADM

## 2024-01-08 RX ORDER — INSULIN LISPRO 100 [IU]/ML
0-4 INJECTION, SOLUTION INTRAVENOUS; SUBCUTANEOUS NIGHTLY
Status: DISCONTINUED | OUTPATIENT
Start: 2024-01-08 | End: 2024-01-09

## 2024-01-08 RX ORDER — MIDAZOLAM HYDROCHLORIDE 1 MG/ML
INJECTION INTRAMUSCULAR; INTRAVENOUS PRN
Status: DISCONTINUED | OUTPATIENT
Start: 2024-01-08 | End: 2024-01-08 | Stop reason: SDUPTHER

## 2024-01-08 RX ORDER — SODIUM CHLORIDE 9 MG/ML
INJECTION, SOLUTION INTRAVENOUS CONTINUOUS
Status: DISCONTINUED | OUTPATIENT
Start: 2024-01-08 | End: 2024-01-10

## 2024-01-08 RX ORDER — SODIUM CHLORIDE 9 MG/ML
INJECTION, SOLUTION INTRAVENOUS CONTINUOUS PRN
Status: DISCONTINUED | OUTPATIENT
Start: 2024-01-08 | End: 2024-01-08 | Stop reason: SDUPTHER

## 2024-01-08 RX ORDER — QUETIAPINE FUMARATE 25 MG/1
50 TABLET, FILM COATED ORAL 2 TIMES DAILY
Status: DISCONTINUED | OUTPATIENT
Start: 2024-01-08 | End: 2024-01-15

## 2024-01-08 RX ORDER — OXYCODONE HYDROCHLORIDE 5 MG/1
10 TABLET ORAL EVERY 4 HOURS
Status: DISCONTINUED | OUTPATIENT
Start: 2024-01-08 | End: 2024-01-23

## 2024-01-08 RX ORDER — INSULIN LISPRO 100 [IU]/ML
0-16 INJECTION, SOLUTION INTRAVENOUS; SUBCUTANEOUS
Status: DISCONTINUED | OUTPATIENT
Start: 2024-01-08 | End: 2024-01-09

## 2024-01-08 RX ORDER — ALBUMIN, HUMAN INJ 5% 5 %
25 SOLUTION INTRAVENOUS ONCE
Status: COMPLETED | OUTPATIENT
Start: 2024-01-08 | End: 2024-01-08

## 2024-01-08 RX ADMIN — MEROPENEM 1000 MG: 1 INJECTION, POWDER, FOR SOLUTION INTRAVENOUS at 04:59

## 2024-01-08 RX ADMIN — INSULIN GLARGINE 15 UNITS: 100 INJECTION, SOLUTION SUBCUTANEOUS at 08:33

## 2024-01-08 RX ADMIN — PROPOFOL 40 MCG/KG/MIN: 10 INJECTION, EMULSION INTRAVENOUS at 07:58

## 2024-01-08 RX ADMIN — ACETAMINOPHEN 1000 MG: 650 SOLUTION ORAL at 21:31

## 2024-01-08 RX ADMIN — VASOPRESSIN 0.03 UNITS/MIN: 20 INJECTION, SOLUTION INTRAVENOUS at 12:59

## 2024-01-08 RX ADMIN — ACETAMINOPHEN 1000 MG: 650 SOLUTION ORAL at 05:29

## 2024-01-08 RX ADMIN — ALBUMIN (HUMAN) 25 G: 12.5 INJECTION, SOLUTION INTRAVENOUS at 08:11

## 2024-01-08 RX ADMIN — INSULIN GLARGINE 15 UNITS: 100 INJECTION, SOLUTION SUBCUTANEOUS at 21:30

## 2024-01-08 RX ADMIN — POTASSIUM CHLORIDE: 2 INJECTION, SOLUTION, CONCENTRATE INTRAVENOUS at 18:10

## 2024-01-08 RX ADMIN — Medication 300 MCG/HR: at 07:22

## 2024-01-08 RX ADMIN — PROPOFOL 40 MCG/KG/MIN: 10 INJECTION, EMULSION INTRAVENOUS at 05:25

## 2024-01-08 RX ADMIN — VASOPRESSIN 0.03 UNITS/MIN: 20 INJECTION, SOLUTION INTRAVENOUS at 23:27

## 2024-01-08 RX ADMIN — PROPOFOL 40 MCG/KG/MIN: 10 INJECTION, EMULSION INTRAVENOUS at 12:58

## 2024-01-08 RX ADMIN — ROCURONIUM BROMIDE 50 MG: 10 INJECTION, SOLUTION INTRAVENOUS at 16:43

## 2024-01-08 RX ADMIN — MICONAZOLE NITRATE: 20 CREAM TOPICAL at 08:14

## 2024-01-08 RX ADMIN — SODIUM CHLORIDE 2.79 UNITS/HR: 9 INJECTION, SOLUTION INTRAVENOUS at 05:05

## 2024-01-08 RX ADMIN — ASPIRIN 81 MG 81 MG: 81 TABLET ORAL at 07:52

## 2024-01-08 RX ADMIN — DOCUSATE SODIUM LIQUID 100 MG: 100 LIQUID ORAL at 07:52

## 2024-01-08 RX ADMIN — OXYCODONE HYDROCHLORIDE 10 MG: 5 TABLET ORAL at 21:30

## 2024-01-08 RX ADMIN — Medication 300 MCG/HR: at 02:05

## 2024-01-08 RX ADMIN — ROCURONIUM BROMIDE 50 MG: 10 INJECTION, SOLUTION INTRAVENOUS at 15:06

## 2024-01-08 RX ADMIN — SENNOSIDES 8.8 MG: 8.8 LIQUID ORAL at 07:52

## 2024-01-08 RX ADMIN — VASOPRESSIN 0.03 UNITS/MIN: 20 INJECTION, SOLUTION INTRAVENOUS at 05:03

## 2024-01-08 RX ADMIN — PROPOFOL 40 MCG/KG/MIN: 10 INJECTION, EMULSION INTRAVENOUS at 00:09

## 2024-01-08 RX ADMIN — MIDAZOLAM 2 MG: 1 INJECTION INTRAMUSCULAR; INTRAVENOUS at 15:06

## 2024-01-08 RX ADMIN — OXYCODONE HYDROCHLORIDE 10 MG: 5 TABLET ORAL at 08:32

## 2024-01-08 RX ADMIN — SODIUM CHLORIDE, PRESERVATIVE FREE 10 ML: 5 INJECTION INTRAVENOUS at 21:33

## 2024-01-08 RX ADMIN — QUETIAPINE FUMARATE 50 MG: 25 TABLET ORAL at 21:33

## 2024-01-08 RX ADMIN — HEPARIN SODIUM 5000 UNITS: 5000 INJECTION INTRAVENOUS; SUBCUTANEOUS at 21:30

## 2024-01-08 RX ADMIN — SODIUM CHLORIDE, PRESERVATIVE FREE 10 ML: 5 INJECTION INTRAVENOUS at 08:14

## 2024-01-08 RX ADMIN — PROPOFOL 40 MCG/KG/MIN: 10 INJECTION, EMULSION INTRAVENOUS at 17:47

## 2024-01-08 RX ADMIN — PANTOPRAZOLE SODIUM 40 MG: 40 INJECTION, POWDER, FOR SOLUTION INTRAVENOUS at 08:32

## 2024-01-08 RX ADMIN — MEROPENEM 1000 MG: 1 INJECTION, POWDER, FOR SOLUTION INTRAVENOUS at 13:44

## 2024-01-08 RX ADMIN — Medication 300 MCG/HR: at 17:31

## 2024-01-08 RX ADMIN — PROPOFOL 40 MCG/KG/MIN: 10 INJECTION, EMULSION INTRAVENOUS at 20:03

## 2024-01-08 RX ADMIN — DOCUSATE SODIUM LIQUID 100 MG: 100 LIQUID ORAL at 21:31

## 2024-01-08 RX ADMIN — Medication 10 MCG/MIN: at 02:25

## 2024-01-08 RX ADMIN — HEPARIN SODIUM 5000 UNITS: 5000 INJECTION INTRAVENOUS; SUBCUTANEOUS at 05:30

## 2024-01-08 RX ADMIN — PROPOFOL 40 MCG/KG/MIN: 10 INJECTION, EMULSION INTRAVENOUS at 02:46

## 2024-01-08 RX ADMIN — QUETIAPINE FUMARATE 50 MG: 25 TABLET ORAL at 08:32

## 2024-01-08 RX ADMIN — ROCURONIUM BROMIDE 50 MG: 10 INJECTION, SOLUTION INTRAVENOUS at 15:57

## 2024-01-08 RX ADMIN — SODIUM CHLORIDE: 9 INJECTION, SOLUTION INTRAVENOUS at 15:02

## 2024-01-08 RX ADMIN — PROPOFOL 40 MCG/KG/MIN: 10 INJECTION, EMULSION INTRAVENOUS at 10:58

## 2024-01-08 RX ADMIN — LEVOTHYROXINE SODIUM 200 MCG: 100 TABLET ORAL at 05:29

## 2024-01-08 RX ADMIN — SENNOSIDES 8.8 MG: 8.8 LIQUID ORAL at 21:31

## 2024-01-08 ASSESSMENT — PULMONARY FUNCTION TESTS
PIF_VALUE: 30
PIF_VALUE: 28
PIF_VALUE: 31
PIF_VALUE: 33
PIF_VALUE: 28
PIF_VALUE: 28
PIF_VALUE: 24

## 2024-01-08 NOTE — OP NOTE
Operative Note      Patient: Ruben Dimas  YOB: 1965  MRN: [id number]    Date of Procedure: 1/8/2024    Pre-Op Diagnosis Codes:     * Necrotizing fasciitis (HCC) [M72.6]    Post-Op Diagnosis: Same     Procedure  Wound preperation of 55cm x47cm abdominal wound= 2585sq cm  Wound vac placement >50sqcm    Surgeon(s):  Escobar Ulloa MD    Assistant:   * No surgical staff found *    Anesthesia: General    Estimated Blood Loss (mL): Minimal    Complications: None    Specimens:   * No specimens in log *    Implants:  * No implants in log *      Drains:   Closed/Suction Drain Lateral;Superior RUQ Bulb (Active)   Site Description Unable to view 01/08/24 1200   Dressing Status Clean, dry & intact 01/08/24 1200   Drainage Appearance Serosanguinous 01/08/24 1200   Drain Status Compressed;To bulb suction 01/08/24 1200   Output (ml) 60 ml 01/08/24 1357       Closed/Suction Drain Lateral;Inferior RUQ Bulb (Active)   Site Description Unable to view 01/08/24 1200   Dressing Status Clean, dry & intact 01/08/24 1200   Drainage Appearance Brown 01/08/24 1200   Drain Status Compressed;To bulb suction 01/08/24 1200   Output (ml) 20 ml 01/08/24 1000       Negative Pressure Wound Therapy Abdomen Medial;Upper (Active)   Wound Type Surgical 01/08/24 1200   Dressing Type Black Foam 01/08/24 1200   Cycle Continuous 01/08/24 1200   Target Pressure (mmHg) 150 01/08/24 1200   Canister changed? Yes 01/08/24 1300   Dressing Status Clean, dry & intact 01/08/24 1200   Dressing Changed Changed/New 01/03/24 1241   Drainage Amount Moderate 01/08/24 1200   Drainage Description Sanguinous 01/08/24 1200   Output (ml) 250 ml 01/08/24 1300   Wound Assessment Other (Comment) 01/08/24 1200   Yokasta-wound Assessment Other (Comment) 01/08/24 1200   Odor None 01/08/24 1200       NG/OG/NJ/NE Tube Nasogastric Right nostril (Active)   Surrounding Skin Clean, dry & intact 01/08/24 1200   Securement device Bridle 01/08/24 1200   Status Clamped 01/08/24 1200

## 2024-01-08 NOTE — BRIEF OP NOTE
Brief Postoperative Note      Patient: Ruben Dimas  YOB: 1965  MRN: [id number]    Date of Procedure: 1/8/2024    Pre-Op Diagnosis Codes:     * Necrotizing fasciitis (HCC) [M72.6]    Post-Op Diagnosis: Same       Procedure  Wound preparation of 55cm x47cm abdominal wound= 2585sq cm  Wound vac placement >50sqcm        Surgeon(s):  Escobar Ulloa MD    Assistant:  * No surgical staff found *    Anesthesia: General    Estimated Blood Loss (mL): Minimal    Complications: None    Specimens:   * No specimens in log *    Implants:  * No implants in log *      Drains:   Closed/Suction Drain Lateral;Superior RUQ Bulb (Active)   Site Description Unable to view 01/08/24 1200   Dressing Status Clean, dry & intact 01/08/24 1200   Drainage Appearance Serosanguinous 01/08/24 1200   Drain Status Compressed;To bulb suction 01/08/24 1200   Output (ml) 60 ml 01/08/24 1357       Closed/Suction Drain Lateral;Inferior RUQ Bulb (Active)   Site Description Unable to view 01/08/24 1200   Dressing Status Clean, dry & intact 01/08/24 1200   Drainage Appearance Brown 01/08/24 1200   Drain Status Compressed;To bulb suction 01/08/24 1200   Output (ml) 20 ml 01/08/24 1000       Negative Pressure Wound Therapy Abdomen Medial;Upper (Active)   Wound Type Surgical 01/08/24 1200   Dressing Type Black Foam 01/08/24 1200   Cycle Continuous 01/08/24 1200   Target Pressure (mmHg) 150 01/08/24 1200   Canister changed? Yes 01/08/24 1300   Dressing Status Clean, dry & intact 01/08/24 1200   Dressing Changed Changed/New 01/03/24 1241   Drainage Amount Moderate 01/08/24 1200   Drainage Description Sanguinous 01/08/24 1200   Output (ml) 250 ml 01/08/24 1300   Wound Assessment Other (Comment) 01/08/24 1200   Yokasta-wound Assessment Other (Comment) 01/08/24 1200   Odor None 01/08/24 1200       NG/OG/NJ/NE Tube Nasogastric Right nostril (Active)   Surrounding Skin Clean, dry & intact 01/08/24 1200   Securement device Bridle 01/08/24 1200   Status Clamped  Standard 01/02/24 2000   Securement Method Securing device (Describe) 01/02/24 2000   Catheter Care  Soap and water 01/02/24 0800   Catheter Best Practices  Drainage tube clipped to bed;Catheter secured to thigh;Tamper seal intact;Bag below bladder;Bag not on floor;Lack of dependent loop in tubing;Drainage bag less than half full 01/02/24 2000   Status Draining 01/02/24 2000   Output (mL) 75 mL 01/02/24 7871       Findings: no s/sx or symptoms of infection      Electronically signed by Escobar Ulloa MD on 1/8/2024 at 5:09 PM

## 2024-01-08 NOTE — PROGRESS NOTES
ICU PROGRESS NOTE        PATIENT NAME: Ruben Dimas  MEDICAL RECORD NO. [id number]  DATE: 1/8/2024    HD: # 7    ASSESSMENT    Patient Active Problem List   Diagnosis    Alcoholic cirrhosis of liver (HCC), sober since 2013    Peripheral edema    Bipolar disorder (HCC)    Type 2 diabetes mellitus with diabetic neuropathy, with long-term current use of insulin (HCC)    Essential hypertension, currently hypotensive    Dyslipidemia    Weakness    Acute metabolic encephalopathy    FABI (obstructive sleep apnea)    Primary osteoarthritis of right knee    Type 2 diabetes mellitus with diabetic neuropathy (HCC)    Acquired hypothyroidism    Urine retention    Anemia    Slow transit constipation    Iron deficiency anemia due to chronic blood loss    Gastroesophageal reflux disease without esophagitis    LEONARDA (acute kidney injury) (HCC)    Secondary esophageal varices without bleeding (HCC)    Portal hypertension (HCC)    Obesity, morbid, BMI 50 or higher (HCC)    Venous stasis dermatitis of both lower extremities    Cellulitis of perineum    Chronic indwelling Clemons catheter    Anxiety and depression    Back pain, chronic    BPH (benign prostatic hyperplasia)    Chronic diastolic CHF (congestive heart failure) (HCC)    Cirrhosis (HCC)    Diabetes mellitus (HCC)    GERD (gastroesophageal reflux disease)    Hepatitis    Hiatal hernia    Hypertension, essential    IBS (irritable bowel syndrome)    Morbid obesity with BMI of 45.0-49.9, adult (HCC)    Neuropathy    Chronic respiratory failure with hypoxia, on home oxygen therapy (HCC)    Sleep apnea    Thyroid disease    Urinary bladder neurogenic dysfunction    Acute UTI    Musculoskeletal immobility    Pyocystis    Myoclonic jerking    Thrombocytopenia (HCC)    Hypotension    Sepsis with acute hypercapnic respiratory failure and septic shock (HCC)    Right lower lobe pneumonia    Acute kidney injury superimposed on CKD (HCC)    Somnolence    Acute respiratory acidosis (HCC)     132* 136   K 4.9 4.2 3.9    103 105   CO2 18* 18* 17*   BUN 35* 36* 36*   CREATININE 1.1 1.1 1.0   GLUCOSE 281* 272* 159*           RADIOLOGY:  Echo (TTE) limited (PRN contrast/bubble/strain/3D)    Result Date: 1/2/2024    Left Ventricle: Preserved left ventricular systolic function. EF by visual approximation is 50%. Unable to assess wall motion.   Right Ventricle: Not assessed due to poor image quality.   Image quality is poor. walls not well visualized. consider definity.     XR ABDOMEN FOR NG/OG/NE TUBE PLACEMENT    Result Date: 1/2/2024  EXAMINATION: ONE SUPINE X-RAY VIEW(S) OF THE ABDOMEN 1/2/2024 8:12 am COMPARISON: 11/03/2023 HISTORY: ORDERING SYSTEM PROVIDED HISTORY:  Confirmation of course of NG/OG/NE tube and location of tip of tube. TECHNOLOGIST PROVIDED HISTORY: Confirmation of course of NG/OG/NE tube and location of tip of tube. Portable?  Yes FINDINGS: Enteric tube tip projects in the expected location of the stomach, with side-port just below the GE junction.  There is likely a gastrostomy tube. Nonspecific bowel gas pattern.  Surgical clips are noted.  Probable right basilar airspace opacities.     Enteric tube tip projects in the expected location of the stomach, with side-port just below the GE junction.        Domenica Horton,   Emergency Medicine Resident PGY-2  Surgical Critical Care Service  1/8/2024, 6:58 AM

## 2024-01-08 NOTE — PROGRESS NOTES
POST OP NOTE    SUBJECTIVE  Pt s/p wound vac exchange and graft preparation. Patient seen and examined at bedside. Somnolent from anesthesia.     OBJECTIVE  VITALS:  BP (!) 139/50   Pulse 53   Temp (!) 95.4 °F (35.2 °C)   Resp 16   Ht 1.778 m (5' 10\")   Wt (!) 162.4 kg (358 lb 1.6 oz)   SpO2 99%   BMI 51.38 kg/m²         GENERAL:  fatigued and somnolent.  No acute distress  CARDIOVASCULAR:  regular rate and rhythm   LUNGS:  CTA Bilaterally  ABDOMEN:   Abdomen soft, tender, large area of debridement with wound vac in place.  INCISION: wound vac in place    ASSESSMENT  1. POD# 0 s/p  wound vac exchange and graft preparation.    PLAN  1. Pain management-fentanyl gtt  2. DVT proph-heprain SQ  3. GI proph-protonix BID  4. Plan for NGT removal  5. OK for meds per G tube  6. Remainder of care per progress note      Domenica Horton DO  Trauma/Surgery Service  1/8/2024 at 5:47 PM

## 2024-01-08 NOTE — PROGRESS NOTES
Comprehensive Nutrition Assessment    Type and Reason for Visit:  Reassess    Nutrition Recommendations/Plan:   TPN to continue, add MVI/trace elements to next bag. No lipids d/t propofol.   If desired to start TF, suggest Immune-enhancing at 10 mL/hr. Goal rate will be 30 mL/hr continuous (with propofol) or 55 mL/hr (no propofol).   Will monitor for start of TF.     Malnutrition Assessment:  Malnutrition Status:  Insufficient data (01/03/24 1416)    Context:  Acute Illness     Findings of the 6 clinical characteristics of malnutrition:  Energy Intake:  Mild decrease in energy intake (Comment)  Weight Loss:  Greater than 7.5% over 3 months     Body Fat Loss:  Unable to assess     Muscle Mass Loss:  Unable to assess    Fluid Accumulation:  Moderate to Severe Generalized   Strength:  Not Performed    Nutrition Assessment:    Pt intubated via trach, propofol at 39 mL/hr (1027 kcal). Pt +NGT, noted plan per Gen Surg to start TF at trickl tomorrow. TPN to continue. NNo BM noted, hypoactive bowel sounds. +2 pitting generalized/BLE, +2 BUE edema noted. Labs reviewed: K+ 3.9 mmol/L, Mg 2.3 mg/dL, Phos 4.3 mg/dL.    Nutrition Related Findings:    labs/meds reviewed Wound Type: Surgical Incision, Wound Vac (to abdomen)       Current Nutrition Intake & Therapies:    Average Meal Intake: NPO  Average Supplements Intake: NPO  Diet NPO Exceptions are: Sips of Water with Meds  PN-Adult 2-in-1 Central Line (Standard)  Current Parenteral Nutrition Orders:  Type and Formula: 2-in-1 Standard   Lipids: Other (Comment) (discontinue with propofol)  Duration: Continuous  Rate/Volume: 56.7  Current PN Order Provides: 1080 kcal, 100 g PRO (plus propofol kcal)  Goal PN Orders Provides:      Anthropometric Measures:  Height: 177.8 cm (5' 10\")  Ideal Body Weight (IBW): 166 lbs (75 kg)    Admission Body Weight: 156.9 kg (345 lb 14.4 oz)  Current Body Weight: 162.4 kg (358 lb 0.4 oz) (1/5/24), 215.7 % IBW. Weight Source: Bed Scale  Current

## 2024-01-08 NOTE — PLAN OF CARE
Problem: Respiratory - Adult  Goal: Achieves optimal ventilation and oxygenation  1/8/2024 0753 by Sabine Bruner RCP  Outcome: Progressing  1/8/2024 0237 by Yesi Aguilera RCP  Outcome: Progressing

## 2024-01-08 NOTE — PROGRESS NOTES
[facility] Nurse  Consult Note       NAME:  Ruben Dimas  MEDICAL RECORD NUMBER:  [id number]  AGE: 58 y.o.   GENDER: male  : 1965  TODAY'S DATE:  2024    Subjective:     Reason for WOCN Evaluation and Assessment: \"back/buttock wounds\"      Ruben Dimas is a 58 y.o. male referred by:   [x] Physician  [] Nursing  [] Other:     Wound Identification:  Wound Type:  dermatitis  Contributing Factors: chronic pressure, decreased mobility, shear force, obesity, decreased tissue oxygenation, incontinence of stool, incontinence of urine, and poor hygiene        Wound History: the patient has a history of moisture associated skin damage and intertriginous skin damage on previous hospital admission     Objective:      BP (!) 139/50   Pulse 53   Temp (!) 95.4 °F (35.2 °C)   Resp 16   Ht 1.778 m (5' 10\")   Wt (!) 162.4 kg (358 lb 1.6 oz)   SpO2 99%   BMI 51.38 kg/m²   Fantasma Risk Score: Fantasma Scale Score: 10    LABS    CBC:   Lab Results   Component Value Date/Time    WBC 14.5 2024 04:52 AM    RBC 2.98 2024 04:52 AM    RBC 3.93 2012 10:04 AM    HGB 8.7 2024 04:52 AM    HCT 27.6 2024 04:52 AM     CMP:  Albumin:    Lab Results   Component Value Date/Time    LABALBU 2.6 2024 04:52 AM    LABALBU 4.0 2012 09:23 AM     PT/INR:    Lab Results   Component Value Date/Time    PROTIME 13.3 2024 08:29 AM    PROTIME 11.6 2012 09:23 AM    INR 1.0 2024 08:29 AM     HgBA1c:    Lab Results   Component Value Date/Time    LABA1C 6.3 2024 01:22 AM     PTT: No components found for: \"LABPTT\"      Assessment:           Irritant contact dermatitis due to dual incontinence. Zinc oxide cream is not adhering to the damaged skin. Will change to triad.               Plan:     Plan of Care:   [x]  Turn and reposition every 2 hours while in bed.     [x] Float heels off of bed with pillows under calves.     [] Heel protective boots (heel medix boots) at all

## 2024-01-08 NOTE — ANESTHESIA PRE PROCEDURE
Cardiovascular:    (+) hypertension:, dysrhythmias: atrial fibrillation, CHF:,                   Neuro/Psych:   (+) neuromuscular disease:, psychiatric history:            GI/Hepatic/Renal:   (+) hiatal hernia, GERD:, liver disease:, morbid obesity          Endo/Other:    (+) DiabetesType II DM, , hypothyroidism::., .                 Abdominal:   (+) obese,           Vascular:   + PVD, aortic or cerebral, .       Other Findings:           Anesthesia Plan      general     ASA 4     (Chart review)        Anesthetic plan and risks discussed with patient and healthcare power of .      Plan discussed with CRNA.                Chart Review  Moreno Jara MD   1/8/2024

## 2024-01-09 ENCOUNTER — APPOINTMENT (OUTPATIENT)
Dept: GENERAL RADIOLOGY | Age: 59
DRG: 853 | End: 2024-01-09
Payer: MEDICARE

## 2024-01-09 LAB
ANION GAP SERPL CALCULATED.3IONS-SCNC: 13 MMOL/L (ref 9–17)
BASOPHILS # BLD: 0 K/UL (ref 0–0.2)
BASOPHILS NFR BLD: 0 % (ref 0–2)
BUN SERPL-MCNC: 36 MG/DL (ref 6–20)
CA-I BLD-SCNC: 1.26 MMOL/L (ref 1.13–1.33)
CALCIUM SERPL-MCNC: 9 MG/DL (ref 8.6–10.4)
CHLORIDE SERPL-SCNC: 108 MMOL/L (ref 98–107)
CO2 SERPL-SCNC: 16 MMOL/L (ref 20–31)
CREAT SERPL-MCNC: 0.9 MG/DL (ref 0.7–1.2)
EOSINOPHIL # BLD: 0.17 K/UL (ref 0–0.4)
EOSINOPHILS RELATIVE PERCENT: 1 % (ref 1–4)
ERYTHROCYTE [DISTWIDTH] IN BLOOD BY AUTOMATED COUNT: 18.6 % (ref 11.8–14.4)
FIO2: 30
GFR SERPL CREATININE-BSD FRML MDRD: >60 ML/MIN/1.73M2
GLUCOSE BLD-MCNC: 164 MG/DL (ref 75–110)
GLUCOSE BLD-MCNC: 201 MG/DL (ref 75–110)
GLUCOSE BLD-MCNC: 215 MG/DL (ref 75–110)
GLUCOSE BLD-MCNC: 219 MG/DL (ref 75–110)
GLUCOSE BLD-MCNC: 227 MG/DL (ref 75–110)
GLUCOSE BLD-MCNC: 239 MG/DL (ref 75–110)
GLUCOSE BLD-MCNC: 248 MG/DL (ref 74–100)
GLUCOSE SERPL-MCNC: 270 MG/DL (ref 70–99)
HCT VFR BLD AUTO: 26.3 % (ref 40.7–50.3)
HGB BLD-MCNC: 8.3 G/DL (ref 13–17)
IMM GRANULOCYTES # BLD AUTO: 1.21 K/UL (ref 0–0.3)
IMM GRANULOCYTES NFR BLD: 7 %
LYMPHOCYTES NFR BLD: 0.69 K/UL (ref 1–4.8)
LYMPHOCYTES RELATIVE PERCENT: 4 % (ref 24–44)
MAGNESIUM SERPL-MCNC: 2.4 MG/DL (ref 1.6–2.6)
MCH RBC QN AUTO: 29.2 PG (ref 25.2–33.5)
MCHC RBC AUTO-ENTMCNC: 31.6 G/DL (ref 28.4–34.8)
MCV RBC AUTO: 92.6 FL (ref 82.6–102.9)
MONOCYTES NFR BLD: 0.69 K/UL (ref 0.1–0.8)
MONOCYTES NFR BLD: 4 % (ref 1–7)
MORPHOLOGY: ABNORMAL
NEGATIVE BASE EXCESS, ART: 7.2 MMOL/L (ref 0–2)
NEUTROPHILS NFR BLD: 84 % (ref 36–66)
NEUTS SEG NFR BLD: 14.54 K/UL (ref 1.8–7.7)
NRBC BLD-RTO: 0 PER 100 WBC
PHOSPHATE SERPL-MCNC: 4.3 MG/DL (ref 2.5–4.5)
PLATELET # BLD AUTO: 73 K/UL (ref 138–453)
PMV BLD AUTO: 11.3 FL (ref 8.1–13.5)
POC HCO3: 18.3 MMOL/L (ref 21–28)
POC LACTIC ACID: 1.2 MMOL/L (ref 0.56–1.39)
POC O2 SATURATION: 96.7 % (ref 94–98)
POC PCO2: 36.3 MM HG (ref 35–48)
POC PH: 7.31 (ref 7.35–7.45)
POC PO2: 95 MM HG (ref 83–108)
POTASSIUM SERPL-SCNC: 4.3 MMOL/L (ref 3.7–5.3)
PROCALCITONIN SERPL-MCNC: 1.25 NG/ML
RBC # BLD AUTO: 2.84 M/UL (ref 4.21–5.77)
SAMPLE SITE: ABNORMAL
SODIUM SERPL-SCNC: 137 MMOL/L (ref 135–144)
WBC OTHER # BLD: 17.3 K/UL (ref 3.5–11.3)

## 2024-01-09 PROCEDURE — 6370000000 HC RX 637 (ALT 250 FOR IP): Performed by: EMERGENCY MEDICINE

## 2024-01-09 PROCEDURE — 2580000003 HC RX 258: Performed by: INTERNAL MEDICINE

## 2024-01-09 PROCEDURE — 83605 ASSAY OF LACTIC ACID: CPT

## 2024-01-09 PROCEDURE — 6370000000 HC RX 637 (ALT 250 FOR IP): Performed by: STUDENT IN AN ORGANIZED HEALTH CARE EDUCATION/TRAINING PROGRAM

## 2024-01-09 PROCEDURE — 82803 BLOOD GASES ANY COMBINATION: CPT

## 2024-01-09 PROCEDURE — 2580000003 HC RX 258: Performed by: STUDENT IN AN ORGANIZED HEALTH CARE EDUCATION/TRAINING PROGRAM

## 2024-01-09 PROCEDURE — 82330 ASSAY OF CALCIUM: CPT

## 2024-01-09 PROCEDURE — 37799 UNLISTED PX VASCULAR SURGERY: CPT

## 2024-01-09 PROCEDURE — 99233 SBSQ HOSP IP/OBS HIGH 50: CPT | Performed by: INTERNAL MEDICINE

## 2024-01-09 PROCEDURE — 2000000000 HC ICU R&B

## 2024-01-09 PROCEDURE — 84100 ASSAY OF PHOSPHORUS: CPT

## 2024-01-09 PROCEDURE — 82947 ASSAY GLUCOSE BLOOD QUANT: CPT

## 2024-01-09 PROCEDURE — 84145 PROCALCITONIN (PCT): CPT

## 2024-01-09 PROCEDURE — 6360000002 HC RX W HCPCS: Performed by: INTERNAL MEDICINE

## 2024-01-09 PROCEDURE — 80048 BASIC METABOLIC PNL TOTAL CA: CPT

## 2024-01-09 PROCEDURE — 94761 N-INVAS EAR/PLS OXIMETRY MLT: CPT

## 2024-01-09 PROCEDURE — 6360000002 HC RX W HCPCS

## 2024-01-09 PROCEDURE — 6360000002 HC RX W HCPCS: Performed by: EMERGENCY MEDICINE

## 2024-01-09 PROCEDURE — 6360000002 HC RX W HCPCS: Performed by: STUDENT IN AN ORGANIZED HEALTH CARE EDUCATION/TRAINING PROGRAM

## 2024-01-09 PROCEDURE — 6360000002 HC RX W HCPCS: Performed by: NURSE PRACTITIONER

## 2024-01-09 PROCEDURE — 2500000003 HC RX 250 WO HCPCS: Performed by: STUDENT IN AN ORGANIZED HEALTH CARE EDUCATION/TRAINING PROGRAM

## 2024-01-09 PROCEDURE — 6370000000 HC RX 637 (ALT 250 FOR IP): Performed by: NURSE PRACTITIONER

## 2024-01-09 PROCEDURE — 2580000003 HC RX 258

## 2024-01-09 PROCEDURE — 71045 X-RAY EXAM CHEST 1 VIEW: CPT

## 2024-01-09 PROCEDURE — 99233 SBSQ HOSP IP/OBS HIGH 50: CPT | Performed by: SURGERY

## 2024-01-09 PROCEDURE — 85025 COMPLETE CBC W/AUTO DIFF WBC: CPT

## 2024-01-09 PROCEDURE — 2700000000 HC OXYGEN THERAPY PER DAY

## 2024-01-09 PROCEDURE — 94003 VENT MGMT INPAT SUBQ DAY: CPT

## 2024-01-09 PROCEDURE — 83735 ASSAY OF MAGNESIUM: CPT

## 2024-01-09 RX ORDER — FENTANYL CITRATE 50 UG/ML
50 INJECTION, SOLUTION INTRAMUSCULAR; INTRAVENOUS ONCE
Status: COMPLETED | OUTPATIENT
Start: 2024-01-09 | End: 2024-01-09

## 2024-01-09 RX ORDER — AMIODARONE HYDROCHLORIDE 200 MG/1
200 TABLET ORAL 2 TIMES DAILY
Status: DISPENSED | OUTPATIENT
Start: 2024-01-09 | End: 2024-01-14

## 2024-01-09 RX ORDER — INSULIN GLARGINE 100 [IU]/ML
20 INJECTION, SOLUTION SUBCUTANEOUS 2 TIMES DAILY
Status: DISCONTINUED | OUTPATIENT
Start: 2024-01-09 | End: 2024-01-09

## 2024-01-09 RX ORDER — INSULIN LISPRO 100 [IU]/ML
0-16 INJECTION, SOLUTION INTRAVENOUS; SUBCUTANEOUS EVERY 4 HOURS
Status: DISCONTINUED | OUTPATIENT
Start: 2024-01-09 | End: 2024-01-22

## 2024-01-09 RX ORDER — METOCLOPRAMIDE HYDROCHLORIDE 5 MG/ML
10 INJECTION INTRAMUSCULAR; INTRAVENOUS EVERY 6 HOURS
Status: DISPENSED | OUTPATIENT
Start: 2024-01-09 | End: 2024-01-10

## 2024-01-09 RX ORDER — INSULIN GLARGINE 100 [IU]/ML
20 INJECTION, SOLUTION SUBCUTANEOUS DAILY
Status: DISCONTINUED | OUTPATIENT
Start: 2024-01-10 | End: 2024-01-10

## 2024-01-09 RX ORDER — HEPARIN SODIUM 5000 [USP'U]/ML
7500 INJECTION, SOLUTION INTRAVENOUS; SUBCUTANEOUS EVERY 8 HOURS SCHEDULED
Status: DISCONTINUED | OUTPATIENT
Start: 2024-01-09 | End: 2024-01-31 | Stop reason: HOSPADM

## 2024-01-09 RX ORDER — AMIODARONE HYDROCHLORIDE 200 MG/1
200 TABLET ORAL DAILY
Status: DISCONTINUED | OUTPATIENT
Start: 2024-01-14 | End: 2024-01-23

## 2024-01-09 RX ORDER — INSULIN GLARGINE 100 [IU]/ML
30 INJECTION, SOLUTION SUBCUTANEOUS NIGHTLY
Status: DISCONTINUED | OUTPATIENT
Start: 2024-01-09 | End: 2024-01-10

## 2024-01-09 RX ADMIN — AMIODARONE HYDROCHLORIDE 200 MG: 200 TABLET ORAL at 20:37

## 2024-01-09 RX ADMIN — POTASSIUM CHLORIDE: 2 INJECTION, SOLUTION, CONCENTRATE INTRAVENOUS at 17:35

## 2024-01-09 RX ADMIN — OXYCODONE HYDROCHLORIDE 10 MG: 5 TABLET ORAL at 23:29

## 2024-01-09 RX ADMIN — INSULIN LISPRO 4 UNITS: 100 INJECTION, SOLUTION INTRAVENOUS; SUBCUTANEOUS at 16:53

## 2024-01-09 RX ADMIN — Medication 200 MCG/HR: at 02:16

## 2024-01-09 RX ADMIN — SODIUM CHLORIDE: 9 INJECTION, SOLUTION INTRAVENOUS at 20:24

## 2024-01-09 RX ADMIN — QUETIAPINE FUMARATE 50 MG: 25 TABLET ORAL at 08:05

## 2024-01-09 RX ADMIN — INSULIN LISPRO 4 UNITS: 100 INJECTION, SOLUTION INTRAVENOUS; SUBCUTANEOUS at 13:17

## 2024-01-09 RX ADMIN — OXYCODONE HYDROCHLORIDE 10 MG: 5 TABLET ORAL at 13:03

## 2024-01-09 RX ADMIN — PROPOFOL 5 MCG/KG/MIN: 10 INJECTION, EMULSION INTRAVENOUS at 02:17

## 2024-01-09 RX ADMIN — OXYCODONE HYDROCHLORIDE 10 MG: 5 TABLET ORAL at 08:05

## 2024-01-09 RX ADMIN — QUETIAPINE FUMARATE 50 MG: 25 TABLET ORAL at 20:37

## 2024-01-09 RX ADMIN — FENTANYL CITRATE 50 MCG: 50 INJECTION, SOLUTION INTRAMUSCULAR; INTRAVENOUS at 06:59

## 2024-01-09 RX ADMIN — INSULIN LISPRO 4 UNITS: 100 INJECTION, SOLUTION INTRAVENOUS; SUBCUTANEOUS at 04:48

## 2024-01-09 RX ADMIN — INSULIN LISPRO 8 UNITS: 100 INJECTION, SOLUTION INTRAVENOUS; SUBCUTANEOUS at 20:42

## 2024-01-09 RX ADMIN — SENNOSIDES 8.8 MG: 8.8 LIQUID ORAL at 20:36

## 2024-01-09 RX ADMIN — MICAFUNGIN SODIUM 100 MG: 100 INJECTION, POWDER, LYOPHILIZED, FOR SOLUTION INTRAVENOUS at 18:36

## 2024-01-09 RX ADMIN — Medication 4 MCG/MIN: at 04:47

## 2024-01-09 RX ADMIN — HEPARIN SODIUM 7500 UNITS: 5000 INJECTION INTRAVENOUS; SUBCUTANEOUS at 20:37

## 2024-01-09 RX ADMIN — ACETAMINOPHEN 1000 MG: 650 SOLUTION ORAL at 20:36

## 2024-01-09 RX ADMIN — INSULIN GLARGINE 15 UNITS: 100 INJECTION, SOLUTION SUBCUTANEOUS at 08:05

## 2024-01-09 RX ADMIN — SENNOSIDES 8.8 MG: 8.8 LIQUID ORAL at 08:05

## 2024-01-09 RX ADMIN — SODIUM CHLORIDE: 9 INJECTION, SOLUTION INTRAVENOUS at 07:39

## 2024-01-09 RX ADMIN — ACETAMINOPHEN 1000 MG: 650 SOLUTION ORAL at 13:02

## 2024-01-09 RX ADMIN — Medication 75 MCG/HR: at 23:32

## 2024-01-09 RX ADMIN — OXYCODONE HYDROCHLORIDE 10 MG: 5 TABLET ORAL at 16:51

## 2024-01-09 RX ADMIN — LANSOPRAZOLE 30 MG: 30 TABLET, ORALLY DISINTEGRATING, DELAYED RELEASE ORAL at 05:00

## 2024-01-09 RX ADMIN — LANSOPRAZOLE 30 MG: 30 TABLET, ORALLY DISINTEGRATING, DELAYED RELEASE ORAL at 16:51

## 2024-01-09 RX ADMIN — METOCLOPRAMIDE 10 MG: 5 INJECTION, SOLUTION INTRAMUSCULAR; INTRAVENOUS at 23:29

## 2024-01-09 RX ADMIN — DOCUSATE SODIUM LIQUID 100 MG: 100 LIQUID ORAL at 20:36

## 2024-01-09 RX ADMIN — DOCUSATE SODIUM LIQUID 100 MG: 100 LIQUID ORAL at 08:05

## 2024-01-09 RX ADMIN — AMIODARONE HYDROCHLORIDE 200 MG: 200 TABLET ORAL at 10:12

## 2024-01-09 RX ADMIN — ACETAMINOPHEN 1000 MG: 650 SOLUTION ORAL at 05:00

## 2024-01-09 RX ADMIN — OXYCODONE HYDROCHLORIDE 10 MG: 5 TABLET ORAL at 20:37

## 2024-01-09 RX ADMIN — OXYCODONE HYDROCHLORIDE 10 MG: 5 TABLET ORAL at 00:27

## 2024-01-09 RX ADMIN — MEROPENEM 1000 MG: 1 INJECTION, POWDER, FOR SOLUTION INTRAVENOUS at 20:28

## 2024-01-09 RX ADMIN — METOCLOPRAMIDE 10 MG: 5 INJECTION, SOLUTION INTRAMUSCULAR; INTRAVENOUS at 16:50

## 2024-01-09 RX ADMIN — OXYCODONE HYDROCHLORIDE 10 MG: 5 TABLET ORAL at 04:47

## 2024-01-09 RX ADMIN — LEVOTHYROXINE SODIUM 200 MCG: 100 TABLET ORAL at 05:00

## 2024-01-09 RX ADMIN — INSULIN GLARGINE 30 UNITS: 100 INJECTION, SOLUTION SUBCUTANEOUS at 20:36

## 2024-01-09 RX ADMIN — ASPIRIN 81 MG 81 MG: 81 TABLET ORAL at 08:05

## 2024-01-09 RX ADMIN — HEPARIN SODIUM 5000 UNITS: 5000 INJECTION INTRAVENOUS; SUBCUTANEOUS at 05:00

## 2024-01-09 ASSESSMENT — PAIN DESCRIPTION - DESCRIPTORS
DESCRIPTORS: DISCOMFORT
DESCRIPTORS: DISCOMFORT

## 2024-01-09 ASSESSMENT — PULMONARY FUNCTION TESTS
PIF_VALUE: 25
PIF_VALUE: 26
PIF_VALUE: 24
PIF_VALUE: 26
PIF_VALUE: 25
PIF_VALUE: 26
PIF_VALUE: 25
PIF_VALUE: 26
PIF_VALUE: 24
PIF_VALUE: 26
PIF_VALUE: 22
PIF_VALUE: 25
PIF_VALUE: 28
PIF_VALUE: 25
PIF_VALUE: 24
PIF_VALUE: 22
PIF_VALUE: 26
PIF_VALUE: 27
PIF_VALUE: 26
PIF_VALUE: 25
PIF_VALUE: 27
PIF_VALUE: 27
PIF_VALUE: 26
PIF_VALUE: 25
PIF_VALUE: 24
PIF_VALUE: 25
PIF_VALUE: 25

## 2024-01-09 ASSESSMENT — PAIN DESCRIPTION - LOCATION
LOCATION: ABDOMEN;BACK
LOCATION: ABDOMEN

## 2024-01-09 ASSESSMENT — PAIN SCALES - GENERAL
PAINLEVEL_OUTOF10: 3
PAINLEVEL_OUTOF10: 2
PAINLEVEL_OUTOF10: 8
PAINLEVEL_OUTOF10: 7
PAINLEVEL_OUTOF10: 5
PAINLEVEL_OUTOF10: 3

## 2024-01-09 ASSESSMENT — PAIN DESCRIPTION - ORIENTATION: ORIENTATION: RIGHT;MID

## 2024-01-09 ASSESSMENT — PAIN - FUNCTIONAL ASSESSMENT
PAIN_FUNCTIONAL_ASSESSMENT: PREVENTS OR INTERFERES WITH ALL ACTIVE AND SOME PASSIVE ACTIVITIES
PAIN_FUNCTIONAL_ASSESSMENT: ACTIVITIES ARE NOT PREVENTED

## 2024-01-09 NOTE — PLAN OF CARE
Problem: Respiratory - Adult  Goal: Achieves optimal ventilation and oxygenation  1/9/2024 0930 by Sabine Bruner RCP  Outcome: Progressing  1/8/2024 2246 by Kay Ang RN  Outcome: Progressing  Flowsheets (Taken 1/8/2024 2000)  Achieves optimal ventilation and oxygenation:   Assess for changes in respiratory status   Assess for changes in mentation and behavior   Position to facilitate oxygenation and minimize respiratory effort   Oxygen supplementation based on oxygen saturation or arterial blood gases   Initiate smoking cessation protocol as indicated   Encourage broncho-pulmonary hygiene including cough, deep breathe, incentive spirometry   Assess the need for suctioning and aspirate as needed   Assess and instruct to report shortness of breath or any respiratory difficulty   Respiratory therapy support as indicated

## 2024-01-09 NOTE — PROGRESS NOTES
ICU PROGRESS NOTE        PATIENT NAME: Ruben Dimas  MEDICAL RECORD NO. [id number]  DATE: 1/9/2024    PRIMARY CARE PHYSICIAN: Ramy Lazo MD    HD: # 8    ASSESSMENT    Patient Active Problem List   Diagnosis    Alcoholic cirrhosis of liver (HCC), sober since 2013    Peripheral edema    Bipolar disorder (HCC)    Type 2 diabetes mellitus with diabetic neuropathy, with long-term current use of insulin (HCC)    Essential hypertension, currently hypotensive    Dyslipidemia    Weakness    Acute metabolic encephalopathy    FABI (obstructive sleep apnea)    Primary osteoarthritis of right knee    Type 2 diabetes mellitus with diabetic neuropathy (HCC)    Acquired hypothyroidism    Urine retention    Anemia    Slow transit constipation    Iron deficiency anemia due to chronic blood loss    Gastroesophageal reflux disease without esophagitis    LEONARDA (acute kidney injury) (HCC)    Secondary esophageal varices without bleeding (HCC)    Portal hypertension (HCC)    Obesity, morbid, BMI 50 or higher (HCC)    Venous stasis dermatitis of both lower extremities    Cellulitis of perineum    Chronic indwelling Clemons catheter    Anxiety and depression    Back pain, chronic    BPH (benign prostatic hyperplasia)    Chronic diastolic CHF (congestive heart failure) (HCC)    Cirrhosis (HCC)    Diabetes mellitus (HCC)    GERD (gastroesophageal reflux disease)    Hepatitis    Hiatal hernia    Hypertension, essential    IBS (irritable bowel syndrome)    Morbid obesity with BMI of 45.0-49.9, adult (HCC)    Neuropathy    Chronic respiratory failure with hypoxia, on home oxygen therapy (HCC)    Sleep apnea    Thyroid disease    Urinary bladder neurogenic dysfunction    Acute UTI    Musculoskeletal immobility    Pyocystis    Myoclonic jerking    Thrombocytopenia (HCC)    Hypotension    Sepsis with acute hypercapnic respiratory failure and septic shock (HCC)    Right lower lobe pneumonia    Acute kidney injury superimposed on CKD (HCC)     chest.  Patient was taken emergently to the operating room for debridement and washout.  Patient was admitted to medical ICU postoperatively and then transferred down to trauma ICU.    No acute events overnight. Pt following commands this morning. No complaints at this time. Pt is s/p wound debridement and wound vacc change on .      OBJECTIVE  VITALS: Temp: Temp: 99.7 °F (37.6 °C)Temp  Av.4 °F (36.9 °C)  Min: 95.4 °F (35.2 °C)  Max: 100 °F (37.8 °C) BP Systolic (24hrs), Av , Min:137 , Max:155   Diastolic (24hrs), Av, Min:48, Max:59   Pulse Pulse  Av.4  Min: 53  Max: 92 Resp Resp  Av.6  Min: 14  Max: 23 Pulse ox SpO2  Av.7 %  Min: 92 %  Max: 100 %    VENT INFORMATION:  Lab Results   Component Value Date/Time    FIO2 30.0 2024 05:57 AM    MODE PRVC/PS 2024 05:42 AM     ICP No data recorded CVP No data recorded    Physical Exam  Constitutional:       Appearance: He is obese. He is ill-appearing.   HENT:      Head: Normocephalic.   Eyes:      Extraocular Movements: Extraocular movements intact.   Cardiovascular:      Rate and Rhythm: Normal rate and regular rhythm.   Pulmonary:      Effort: Pulmonary effort is normal.   Abdominal:      Palpations: Abdomen is soft.      Comments: Wound vacc in place   Skin:     General: Skin is warm and dry.   Neurological:      Comments: GCS 11T (E4, V1, M6)       Drain/tube output:    Output by Drain (mL) 24 07 - 24 1900 24 190 - 24 0700 24 07 - 24 1900 24 190 - 24 0700 24 07 - 24 0903   Closed/Suction Drain Lateral;Superior RUQ Bulb 730 585 930 560 70   Closed/Suction Drain Lateral;Inferior RUQ Bulb 100 35 240 70 30   Negative Pressure Wound Therapy Abdomen Medial;Upper 300 600 250 800 100         LAB:  CBC:   Recent Labs     24  0452 24  1809 24  0404   WBC 14.5* 16.1* 17.3*   HGB 8.7* 8.9* 8.3*   HCT 27.6* 28.0* 26.3*   MCV 92.6 92.7 92.6   PLT 90* 93*

## 2024-01-09 NOTE — PROGRESS NOTES
Comprehensive Nutrition Assessment    Type and Reason for Visit:  Reassess    Nutrition Recommendations/Plan:   TPN to continue at current rate - suggest in next bag decrease Dextrose to 150 gm.  Will provide 910 kcals, 100 gm protein per day.    Continue trickle TF of Immune Enhancing formula at 25 mL/hr.  As tolerated, suggest slowly advancing to goal 60 mL/hr = 2160 kcals, 135 gm protein per day.  As TF advanced to goal, suggest weaning/discontinuing TPN.  Will monitor labs, weights, and plan of care.     Malnutrition Assessment:  Malnutrition Status:  Insufficient data (01/03/24 1416)    Context:  Acute Illness       Nutrition Assessment:    Pt remains intubated via trach.  Propofol off.  Trickle TF of Immune Enhancing formula started at 25 mL/hr.  TPN to continue - discussed with RN who requests decreased dextrose in TPN due to elevated blood sugars and start of TF.  Noted insulin regimen has been adjusted.  Labs reviewed: Glucose 164-270 mg/dL, BUN 36 mg/dL.  Meds include: Colace, Lantus, Humalog SS, Reglan (started today), Senokot.  S/p debridement and wound vac change yesterday.    Nutrition Related Findings:    labs/meds reviewed.  +1 facial edema.   Wound Type: Surgical Incision, Wound Vac (to abdomen)       Current Nutrition Intake & Therapies:    Average Meal Intake: NPO  Average Supplements Intake: NPO  Diet NPO Exceptions are: Sips of Water with Meds  PN-Adult 2-in-1 Central Line (Standard)  ADULT TUBE FEEDING; Nasogastric; Immune Enhancing; Continuous; 25; No; 30; Q 4 hours  Current Tube Feeding (TF) Orders:  Feeding Route: Nasogastric  Formula: Immune Enhancing  Schedule: Continuous  Feeding Regimen: 25 mL/hr  Additives/Modulars: None  Water Flushes: 30 mL q 4 hrs  Current TF & Flush Orders Provides: At 25 mL/hr = 900 kcals, 56 gm protein  Goal TF & Flush Orders Provides: Immune Enhancing goal 60 mL/hr = 2160 kcals, 135 gm protein    Current Parenteral Nutrition Orders:  Type and Formula: 2-in-1

## 2024-01-09 NOTE — PROGRESS NOTES
[facility] -   Infectious diseases evaluation  admission date 1/1/2024    reason for consultation:   Necrotizing Faciitis    Impression :   Current:  1/1/24 Necrotizing faciitis 2/2 Polymicrobial infection with T2DM and PEG tube dislodgement and infected   LEONARDA on CKD   1/2/24 S/p partial wedge gastrectomy due to infected PEG tube and dislodgement   1/2/24 S/p debridement of abdomen and indwelling mesh removal,  due to concern for necrotizing fasciitis, wound vac in place  1/3/24 GASTROSTOMY TUBE PLACEMENT, REPAIR OF LARGE VENTRAL DEFECT   Alcoholic Liver cirrhosis - found intra op  Elevated CRP  Lactic acidosis- initial 4.1 now 6.2  Bandemia leukemoid reaction- WBC 15 to 38 to 27 -45  Proteus UTI  RLL pseudomonas pneumonia     Other:    Discussion / summary of stay / plan of care     Recommendations     Respiratory culture: pseudomonas multiS 1/1/24  U cx proteus multiS  Bcx pending - SCN x 1  is a contamination  MRSA swab and COVID pending   1/5 abd wound GNR EF and yeast- more ID to come  Leukemoid reaction better 16 -17 but stalling  Zyvox stopped 1/6 due to thrombocytopenia  Abd wound prep  1/8    Keep meropenem - and add micafungin  1/9/24 due to persistent bandemia,  to cover the abd per cx, and watch WBC response  Avoid fluconazole due to amiodarone  CT abd suggesting bilat LL atelectasis rather than  pneumonia   Micro to run further the cx of the abd wall  of 1/5/24  Plts dropping further, might need HIT panel        Infection Control Recommendations   [place] Precautions  Contact Isolation       Antimicrobial Stewardship Recommendations   Simplification of therapy  Targeted therapy    History of Present Illness:   Initial history:  Ruben Dimas is a 58 y.o.-year-old male presents from [facility] via EMS for complaint of chest pain/abdominal pain.  Patient has a history of type 2 diabetes melitis, hypertension, hyperlipidemia, hypothyroidism,  [facility] Endoscopy    UPPER GASTROINTESTINAL ENDOSCOPY N/A 04/18/2023    EGD ESOPHAGOGASTRODUODENOSCOPY performed by Dorian Rendon MD at [facility] OR    UPPER GASTROINTESTINAL ENDOSCOPY N/A 11/8/2023    EGD ESOPHAGOGASTRODUODENOSCOPY PEG TUBE INSERTION performed by Mary Carmen Monte MD at [facility] OR    VENTRAL HERNIA REPAIR N/A 05/20/2018    REPAIR AND REDUCTION OF RECURRENT INCARCERATED INCISIONAL HERNIA WITH MESH performed by Bijan Camejo MD at [facility] OR       Medications:      oxyCODONE  10 mg Oral Q4H    QUEtiapine  50 mg Oral BID    insulin glargine  15 Units SubCUTAneous BID    lansoprazole  30 mg Oral BID AC    insulin lispro  0-16 Units SubCUTAneous TID WC    insulin lispro  0-4 Units SubCUTAneous Nightly    acetaminophen  1,000 mg Oral 3 times per day    docusate  100 mg Oral BID    senna  5 mL Oral BID    aspirin  81 mg Per NG tube Daily    levothyroxine  200 mcg Per G Tube Daily    heparin (porcine)  5,000 Units SubCUTAneous 3 times per day       Social History:     Social History     Socioeconomic History    Marital status:      Spouse name: Not on file    Number of children: Not on file    Years of education: Not on file    Highest education level: Not on file   Occupational History    Not on file   Tobacco Use    Smoking status: Never    Smokeless tobacco: Never   Vaping Use    Vaping Use: Never used   Substance and Sexual Activity    Alcohol use: No     Comment: quit drinking 2012    Drug use: No    Sexual activity: Not on file   Other Topics Concern    Not on file   Social History Narrative    Not on file     Social Determinants of Health     Financial Resource Strain: Not on file   Food Insecurity: Not on file   Transportation Needs: Not on file   Physical Activity: Not on file   Stress: Not on file   Social Connections: Not on file   Intimate Partner Violence: Not on file   Housing Stability: Not on file       Family History:     Family History   Adopted: Yes   Problem Relation Age of

## 2024-01-09 NOTE — ANESTHESIA POSTPROCEDURE EVALUATION
Department of Anesthesiology  Postprocedure Note    Patient: Ruben Dimas  MRN: [id number]  YOB: 1965  Date of evaluation: 1/9/2024    Procedure Summary       Date: 01/08/24 Room / Location: [address]    Anesthesia Start: 1502 Anesthesia Stop: 1735    Procedure: WOUND VAC EXCHANGE, GRAFT PREPERATION 54X47 AND NWP GREATER THAN 50 SQ CM Diagnosis:       Necrotizing fasciitis (HCC)      (Necrotizing fasciitis (HCC) [M72.6])    Surgeons: Escobar Ulloa MD Responsible Provider: Moreno Jara MD    Anesthesia Type: general ASA Status: 4            Anesthesia Type: No value filed.    Des Phase I:      Des Phase II:    POST-OP ANESTHESIA NOTE       BP (!) 155/57   Pulse 81   Temp 99.1 °F (37.3 °C)   Resp 19   Ht 1.778 m (5' 10\")   Wt (!) 161 kg (355 lb)   SpO2 100%   BMI 50.94 kg/m²    Pain Assessment: Critical Care Pain Observation Tool (CPOT)  Pain Level: 8         Anesthesia Post Evaluation    Patient location during evaluation: ICU  Patient participation: complete - patient cannot participate  Level of consciousness: sedated and ventilated  Pain score: 8 (CPOT)  Airway patency: patent  Nausea & Vomiting: no vomiting and no nausea  Cardiovascular status: hemodynamically stable  Respiratory status: ventilator and intubated (trach tube in place)  Hydration status: stable  Pain management: adequate        No notable events documented.

## 2024-01-09 NOTE — PLAN OF CARE
Problem: Discharge Planning  Goal: Discharge to home or other facility with appropriate resources  Outcome: Progressing  Flowsheets (Taken 1/8/2024 2000)  Discharge to home or other facility with appropriate resources:   Identify barriers to discharge with patient and caregiver   Arrange for needed discharge resources and transportation as appropriate   Identify discharge learning needs (meds, wound care, etc)   Arrange for interpreters to assist at discharge as needed   Refer to discharge planning if patient needs post-hospital services based on physician order or complex needs related to functional status, cognitive ability or social support system     Problem: Pain  Goal: Verbalizes/displays adequate comfort level or baseline comfort level  Outcome: Progressing     Problem: Safety - Adult  Goal: Free from fall injury  Outcome: Progressing     Problem: Respiratory - Adult  Goal: Achieves optimal ventilation and oxygenation  Outcome: Progressing  Flowsheets (Taken 1/8/2024 2000)  Achieves optimal ventilation and oxygenation:   Assess for changes in respiratory status   Assess for changes in mentation and behavior   Position to facilitate oxygenation and minimize respiratory effort   Oxygen supplementation based on oxygen saturation or arterial blood gases   Initiate smoking cessation protocol as indicated   Encourage broncho-pulmonary hygiene including cough, deep breathe, incentive spirometry   Assess the need for suctioning and aspirate as needed   Assess and instruct to report shortness of breath or any respiratory difficulty   Respiratory therapy support as indicated     Problem: Chronic Conditions and Co-morbidities  Goal: Patient's chronic conditions and co-morbidity symptoms are monitored and maintained or improved  Outcome: Progressing  Flowsheets (Taken 1/8/2024 2000)  Care Plan - Patient's Chronic Conditions and Co-Morbidity Symptoms are Monitored and Maintained or Improved:   Monitor and assess  patient's chronic conditions and comorbid symptoms for stability, deterioration, or improvement   Update acute care plan with appropriate goals if chronic or comorbid symptoms are exacerbated and prevent overall improvement and discharge   Collaborate with multidisciplinary team to address chronic and comorbid conditions and prevent exacerbation or deterioration     Problem: Skin/Tissue Integrity  Goal: Absence of new skin breakdown  Description: 1.  Monitor for areas of redness and/or skin breakdown  2.  Assess vascular access sites hourly  3.  Every 4-6 hours minimum:  Change oxygen saturation probe site  4.  Every 4-6 hours:  If on nasal continuous positive airway pressure, respiratory therapy assess nares and determine need for appliance change or resting period.  Outcome: Progressing     Problem: ABCDS Injury Assessment  Goal: Absence of physical injury  Outcome: Progressing  Flowsheets (Taken 1/8/2024 7733)  Absence of Physical Injury: Implement safety measures based on patient assessment

## 2024-01-09 NOTE — PROGRESS NOTES
PALLIATIVE CARE NURSING ASSESSMENT    Patient: Ruben Dimas  Room: 1014/1014-01    Reason For Consult   Goals of care evaluation  Distress management  Guidance and support  Facilitate communications  Assistance in coordinating care    Code Status:       Impression: Ruben Dimas is a 58 y.o. year old male  has a past medical history of A-fib (HCC), Anxiety and depression, Back pain, chronic, BPH (benign prostatic hyperplasia), Cellulitis of left lower extremity, Cellulitis of right lower extremity, CHF (congestive heart failure) (HCC), Chronic respiratory failure with hypoxia (HCC), Cirrhosis (HCC), Diabetes mellitus (HCC), Epididymoorchitis, GERD (gastroesophageal reflux disease), H/O cardiac catheterization, Hepatitis, Hiatal hernia, History of alcohol abuse, Hyperlipidemia, Hypertension, IBS (irritable bowel syndrome), Incisional hernia with obstruction but no gangrene, Klebsiella sepsis (HCC), Metabolic encephalopathy, Morbid obesity (HCC), Neuropathy, On home oxygen therapy, On home oxygen therapy, Pancreatitis, Poisoning by insulin and oral hypoglycemic (antidiabetic) drugs, accidental (unintentional), initial encounter, Primary osteoarthritis of right knee, Retention, urine, SBO (small bowel obstruction) (HCC), Secondary hypercoagulable state (HCC), Septic shock (HCC), Sleep apnea, Sleep apnea, obstructive, Thyroid disease, Under care of team, Urinary bladder neurogenic dysfunction, and Urinary tract infection associated with indwelling urethral catheter (HCC)..  Currently hospitalized for the management of septic shock.  The Palliative Care Team is following to assist with patient and family support and goals of care.      Vital Signs  Blood pressure (!) 150/50, pulse 84, temperature 98.2 °F (36.8 °C), temperature source Bladder, resp. rate 27, height 1.778 m (5' 10\"), weight (!) 161 kg (355 lb), SpO2 100 %.    Patient Active Problem List   Diagnosis    Alcoholic cirrhosis of liver (HCC), sober since 2013

## 2024-01-10 ENCOUNTER — APPOINTMENT (OUTPATIENT)
Dept: GENERAL RADIOLOGY | Age: 59
DRG: 853 | End: 2024-01-10
Payer: MEDICARE

## 2024-01-10 LAB
ALBUMIN SERPL-MCNC: 2.1 G/DL (ref 3.5–5.2)
ALBUMIN/GLOB SERPL: 0.7 {RATIO} (ref 1–2.5)
ALP SERPL-CCNC: 188 U/L (ref 40–129)
ALT SERPL-CCNC: 13 U/L (ref 5–41)
ANION GAP SERPL CALCULATED.3IONS-SCNC: 8 MMOL/L (ref 9–17)
AST SERPL-CCNC: 19 U/L
BASOPHILS # BLD: 0 K/UL (ref 0–0.2)
BASOPHILS NFR BLD: 0 % (ref 0–2)
BILIRUB DIRECT SERPL-MCNC: 1 MG/DL
BILIRUB INDIRECT SERPL-MCNC: 0.5 MG/DL (ref 0–1)
BILIRUB SERPL-MCNC: 1.5 MG/DL (ref 0.3–1.2)
BUN SERPL-MCNC: 40 MG/DL (ref 6–20)
CA-I BLD-SCNC: 1.36 MMOL/L (ref 1.13–1.33)
CALCIUM SERPL-MCNC: 8.8 MG/DL (ref 8.6–10.4)
CHLORIDE SERPL-SCNC: 113 MMOL/L (ref 98–107)
CO2 SERPL-SCNC: 19 MMOL/L (ref 20–31)
CREAT SERPL-MCNC: 0.9 MG/DL (ref 0.7–1.2)
EOSINOPHIL # BLD: 0.37 K/UL (ref 0–0.4)
EOSINOPHILS RELATIVE PERCENT: 3 % (ref 1–4)
ERYTHROCYTE [DISTWIDTH] IN BLOOD BY AUTOMATED COUNT: 18.9 % (ref 11.8–14.4)
FIO2: 30
GFR SERPL CREATININE-BSD FRML MDRD: >60 ML/MIN/1.73M2
GLUCOSE BLD-MCNC: 167 MG/DL (ref 75–110)
GLUCOSE BLD-MCNC: 178 MG/DL (ref 75–110)
GLUCOSE BLD-MCNC: 187 MG/DL (ref 75–110)
GLUCOSE BLD-MCNC: 189 MG/DL (ref 75–110)
GLUCOSE BLD-MCNC: 196 MG/DL (ref 75–110)
GLUCOSE BLD-MCNC: 217 MG/DL (ref 74–100)
GLUCOSE SERPL-MCNC: 237 MG/DL (ref 70–99)
HCT VFR BLD AUTO: 24.7 % (ref 40.7–50.3)
HGB BLD-MCNC: 7.7 G/DL (ref 13–17)
IMM GRANULOCYTES # BLD AUTO: 0.25 K/UL (ref 0–0.3)
IMM GRANULOCYTES NFR BLD: 2 %
LYMPHOCYTES NFR BLD: 0.99 K/UL (ref 1–4.8)
LYMPHOCYTES RELATIVE PERCENT: 8 % (ref 24–44)
MAGNESIUM SERPL-MCNC: 2.5 MG/DL (ref 1.6–2.6)
MCH RBC QN AUTO: 29.4 PG (ref 25.2–33.5)
MCHC RBC AUTO-ENTMCNC: 31.2 G/DL (ref 28.4–34.8)
MCV RBC AUTO: 94.3 FL (ref 82.6–102.9)
MODE: ABNORMAL
MONOCYTES NFR BLD: 0.5 K/UL (ref 0.1–0.8)
MONOCYTES NFR BLD: 4 % (ref 1–7)
MORPHOLOGY: ABNORMAL
NEGATIVE BASE EXCESS, ART: 7.1 MMOL/L (ref 0–2)
NEUTROPHILS NFR BLD: 83 % (ref 36–66)
NEUTS SEG NFR BLD: 10.29 K/UL (ref 1.8–7.7)
NRBC BLD-RTO: 0 PER 100 WBC
O2 DELIVERY DEVICE: ABNORMAL
PHOSPHATE SERPL-MCNC: 3.4 MG/DL (ref 2.5–4.5)
PLATELET # BLD AUTO: 82 K/UL (ref 138–453)
PMV BLD AUTO: 10.5 FL (ref 8.1–13.5)
POC HCO3: 18.8 MMOL/L (ref 21–28)
POC LACTIC ACID: 0.9 MMOL/L (ref 0.56–1.39)
POC O2 SATURATION: 97.2 % (ref 94–98)
POC PCO2: 38.7 MM HG (ref 35–48)
POC PH: 7.29 (ref 7.35–7.45)
POC PO2: 102.4 MM HG (ref 83–108)
POTASSIUM SERPL-SCNC: 3.7 MMOL/L (ref 3.7–5.3)
PROCALCITONIN SERPL-MCNC: 1.39 NG/ML
PROT SERPL-MCNC: 5 G/DL (ref 6.4–8.3)
RBC # BLD AUTO: 2.62 M/UL (ref 4.21–5.77)
SAMPLE SITE: ABNORMAL
SODIUM SERPL-SCNC: 140 MMOL/L (ref 135–144)
WBC OTHER # BLD: 12.4 K/UL (ref 3.5–11.3)

## 2024-01-10 PROCEDURE — 6360000002 HC RX W HCPCS: Performed by: STUDENT IN AN ORGANIZED HEALTH CARE EDUCATION/TRAINING PROGRAM

## 2024-01-10 PROCEDURE — 2580000003 HC RX 258: Performed by: INTERNAL MEDICINE

## 2024-01-10 PROCEDURE — 6360000002 HC RX W HCPCS

## 2024-01-10 PROCEDURE — 83605 ASSAY OF LACTIC ACID: CPT

## 2024-01-10 PROCEDURE — 94761 N-INVAS EAR/PLS OXIMETRY MLT: CPT

## 2024-01-10 PROCEDURE — 85025 COMPLETE CBC W/AUTO DIFF WBC: CPT

## 2024-01-10 PROCEDURE — 99232 SBSQ HOSP IP/OBS MODERATE 35: CPT | Performed by: INTERNAL MEDICINE

## 2024-01-10 PROCEDURE — 71045 X-RAY EXAM CHEST 1 VIEW: CPT

## 2024-01-10 PROCEDURE — 6370000000 HC RX 637 (ALT 250 FOR IP): Performed by: EMERGENCY MEDICINE

## 2024-01-10 PROCEDURE — 6370000000 HC RX 637 (ALT 250 FOR IP): Performed by: STUDENT IN AN ORGANIZED HEALTH CARE EDUCATION/TRAINING PROGRAM

## 2024-01-10 PROCEDURE — 82330 ASSAY OF CALCIUM: CPT

## 2024-01-10 PROCEDURE — 84100 ASSAY OF PHOSPHORUS: CPT

## 2024-01-10 PROCEDURE — 84145 PROCALCITONIN (PCT): CPT

## 2024-01-10 PROCEDURE — 80048 BASIC METABOLIC PNL TOTAL CA: CPT

## 2024-01-10 PROCEDURE — 6360000002 HC RX W HCPCS: Performed by: INTERNAL MEDICINE

## 2024-01-10 PROCEDURE — 82803 BLOOD GASES ANY COMBINATION: CPT

## 2024-01-10 PROCEDURE — 94003 VENT MGMT INPAT SUBQ DAY: CPT

## 2024-01-10 PROCEDURE — 2700000000 HC OXYGEN THERAPY PER DAY

## 2024-01-10 PROCEDURE — 83735 ASSAY OF MAGNESIUM: CPT

## 2024-01-10 PROCEDURE — 37799 UNLISTED PX VASCULAR SURGERY: CPT

## 2024-01-10 PROCEDURE — 82947 ASSAY GLUCOSE BLOOD QUANT: CPT

## 2024-01-10 PROCEDURE — 80076 HEPATIC FUNCTION PANEL: CPT

## 2024-01-10 PROCEDURE — 2580000003 HC RX 258

## 2024-01-10 PROCEDURE — 2000000000 HC ICU R&B

## 2024-01-10 PROCEDURE — 2500000003 HC RX 250 WO HCPCS: Performed by: STUDENT IN AN ORGANIZED HEALTH CARE EDUCATION/TRAINING PROGRAM

## 2024-01-10 PROCEDURE — 99233 SBSQ HOSP IP/OBS HIGH 50: CPT | Performed by: SURGERY

## 2024-01-10 PROCEDURE — 6360000002 HC RX W HCPCS: Performed by: NURSE PRACTITIONER

## 2024-01-10 RX ORDER — FENTANYL CITRATE 50 UG/ML
100 INJECTION, SOLUTION INTRAMUSCULAR; INTRAVENOUS ONCE
Status: DISCONTINUED | OUTPATIENT
Start: 2024-01-10 | End: 2024-01-11

## 2024-01-10 RX ORDER — INSULIN GLARGINE 100 [IU]/ML
30 INJECTION, SOLUTION SUBCUTANEOUS 2 TIMES DAILY
Status: DISCONTINUED | OUTPATIENT
Start: 2024-01-10 | End: 2024-01-16

## 2024-01-10 RX ADMIN — DOCUSATE SODIUM LIQUID 100 MG: 100 LIQUID ORAL at 21:20

## 2024-01-10 RX ADMIN — HEPARIN SODIUM 7500 UNITS: 5000 INJECTION INTRAVENOUS; SUBCUTANEOUS at 06:18

## 2024-01-10 RX ADMIN — ACETAMINOPHEN 1000 MG: 650 SOLUTION ORAL at 21:20

## 2024-01-10 RX ADMIN — ACETAMINOPHEN 1000 MG: 650 SOLUTION ORAL at 04:20

## 2024-01-10 RX ADMIN — INSULIN LISPRO 4 UNITS: 100 INJECTION, SOLUTION INTRAVENOUS; SUBCUTANEOUS at 12:24

## 2024-01-10 RX ADMIN — OXYCODONE HYDROCHLORIDE 10 MG: 5 TABLET ORAL at 17:03

## 2024-01-10 RX ADMIN — LEVOTHYROXINE SODIUM 200 MCG: 100 TABLET ORAL at 06:17

## 2024-01-10 RX ADMIN — AMIODARONE HYDROCHLORIDE 200 MG: 200 TABLET ORAL at 21:14

## 2024-01-10 RX ADMIN — HEPARIN SODIUM 7500 UNITS: 5000 INJECTION INTRAVENOUS; SUBCUTANEOUS at 21:16

## 2024-01-10 RX ADMIN — MEROPENEM 1000 MG: 1 INJECTION, POWDER, FOR SOLUTION INTRAVENOUS at 14:00

## 2024-01-10 RX ADMIN — MICAFUNGIN SODIUM 100 MG: 100 INJECTION, POWDER, LYOPHILIZED, FOR SOLUTION INTRAVENOUS at 08:31

## 2024-01-10 RX ADMIN — INSULIN LISPRO 4 UNITS: 100 INJECTION, SOLUTION INTRAVENOUS; SUBCUTANEOUS at 17:03

## 2024-01-10 RX ADMIN — OXYCODONE HYDROCHLORIDE 10 MG: 5 TABLET ORAL at 04:20

## 2024-01-10 RX ADMIN — MEROPENEM 1000 MG: 1 INJECTION, POWDER, FOR SOLUTION INTRAVENOUS at 21:33

## 2024-01-10 RX ADMIN — INSULIN GLARGINE 30 UNITS: 100 INJECTION, SOLUTION SUBCUTANEOUS at 08:26

## 2024-01-10 RX ADMIN — OXYCODONE HYDROCHLORIDE 10 MG: 5 TABLET ORAL at 12:19

## 2024-01-10 RX ADMIN — LANSOPRAZOLE 30 MG: 30 TABLET, ORALLY DISINTEGRATING, DELAYED RELEASE ORAL at 06:18

## 2024-01-10 RX ADMIN — FENTANYL CITRATE 100 MCG: 50 INJECTION, SOLUTION INTRAMUSCULAR; INTRAVENOUS at 12:26

## 2024-01-10 RX ADMIN — LANSOPRAZOLE 30 MG: 30 TABLET, ORALLY DISINTEGRATING, DELAYED RELEASE ORAL at 17:04

## 2024-01-10 RX ADMIN — PROPOFOL 30 MG: 10 INJECTION, EMULSION INTRAVENOUS at 12:25

## 2024-01-10 RX ADMIN — INSULIN LISPRO 4 UNITS: 100 INJECTION, SOLUTION INTRAVENOUS; SUBCUTANEOUS at 00:53

## 2024-01-10 RX ADMIN — DOCUSATE SODIUM LIQUID 100 MG: 100 LIQUID ORAL at 08:25

## 2024-01-10 RX ADMIN — QUETIAPINE FUMARATE 50 MG: 25 TABLET ORAL at 21:14

## 2024-01-10 RX ADMIN — MEROPENEM 1000 MG: 1 INJECTION, POWDER, FOR SOLUTION INTRAVENOUS at 05:32

## 2024-01-10 RX ADMIN — OXYCODONE HYDROCHLORIDE 10 MG: 5 TABLET ORAL at 08:25

## 2024-01-10 RX ADMIN — SENNOSIDES 8.8 MG: 8.8 LIQUID ORAL at 08:25

## 2024-01-10 RX ADMIN — ACETAMINOPHEN 1000 MG: 650 SOLUTION ORAL at 14:08

## 2024-01-10 RX ADMIN — ASPIRIN 81 MG 81 MG: 81 TABLET ORAL at 08:25

## 2024-01-10 RX ADMIN — METOCLOPRAMIDE 10 MG: 5 INJECTION, SOLUTION INTRAMUSCULAR; INTRAVENOUS at 04:20

## 2024-01-10 RX ADMIN — INSULIN LISPRO 8 UNITS: 100 INJECTION, SOLUTION INTRAVENOUS; SUBCUTANEOUS at 06:17

## 2024-01-10 RX ADMIN — AMIODARONE HYDROCHLORIDE 200 MG: 200 TABLET ORAL at 08:25

## 2024-01-10 RX ADMIN — OXYCODONE HYDROCHLORIDE 10 MG: 5 TABLET ORAL at 21:14

## 2024-01-10 RX ADMIN — QUETIAPINE FUMARATE 50 MG: 25 TABLET ORAL at 08:25

## 2024-01-10 RX ADMIN — INSULIN GLARGINE 30 UNITS: 100 INJECTION, SOLUTION SUBCUTANEOUS at 21:15

## 2024-01-10 RX ADMIN — Medication 200 MCG/HR: at 23:14

## 2024-01-10 RX ADMIN — PROPOFOL 20 MCG/KG/MIN: 10 INJECTION, EMULSION INTRAVENOUS at 10:21

## 2024-01-10 RX ADMIN — SENNOSIDES 8.8 MG: 8.8 LIQUID ORAL at 21:20

## 2024-01-10 RX ADMIN — HEPARIN SODIUM 7500 UNITS: 5000 INJECTION INTRAVENOUS; SUBCUTANEOUS at 14:09

## 2024-01-10 ASSESSMENT — PULMONARY FUNCTION TESTS
PIF_VALUE: 25
PIF_VALUE: 24
PIF_VALUE: 25
PIF_VALUE: 29
PIF_VALUE: 29
PIF_VALUE: 24
PIF_VALUE: 24
PIF_VALUE: 23
PIF_VALUE: 22
PIF_VALUE: 24
PIF_VALUE: 25
PIF_VALUE: 24
PIF_VALUE: 41
PIF_VALUE: 24
PIF_VALUE: 27
PIF_VALUE: 26
PIF_VALUE: 26
PIF_VALUE: 22
PIF_VALUE: 25
PIF_VALUE: 23
PIF_VALUE: 26
PIF_VALUE: 18
PIF_VALUE: 23
PIF_VALUE: 22
PIF_VALUE: 26
PIF_VALUE: 24
PIF_VALUE: 26
PIF_VALUE: 25
PIF_VALUE: 22
PIF_VALUE: 19
PIF_VALUE: 29
PIF_VALUE: 23
PIF_VALUE: 24
PIF_VALUE: 25
PIF_VALUE: 24
PIF_VALUE: 21
PIF_VALUE: 23
PIF_VALUE: 23
PIF_VALUE: 26
PIF_VALUE: 25
PIF_VALUE: 22
PIF_VALUE: 24
PIF_VALUE: 25
PIF_VALUE: 23
PIF_VALUE: 24
PIF_VALUE: 23
PIF_VALUE: 26
PIF_VALUE: 23
PIF_VALUE: 27
PIF_VALUE: 22

## 2024-01-10 NOTE — PLAN OF CARE
Problem: Respiratory - Adult  Goal: Achieves optimal ventilation and oxygenation  1/9/2024 2004 by Nevaeh Castle RCP  Outcome: Progressing

## 2024-01-10 NOTE — PROGRESS NOTES
PALLIATIVE CARE NURSING ASSESSMENT    Patient: Ruben Dimas  Room: 1014/1014-01    Reason For Consult   Goals of care evaluation  Distress management  Guidance and support  Facilitate communications  Assistance in coordinating care    Code Status: Full Code    Summary:  Palliative care visit to bedside.   Pt remains with trach to ventilator.    Pt opens eyes, answers questions appropriately  Thanked me for my visit.   Palliative care will continue to follow with patient for support and goals of care.   Plan is transition to LTACH when ready.       Impression: Ruben Dimas is a 58 y.o. year old male  has a past medical history of A-fib (HCC), Anxiety and depression, Back pain, chronic, BPH (benign prostatic hyperplasia), Cellulitis of left lower extremity, Cellulitis of right lower extremity, CHF (congestive heart failure) ([facility]), Chronic respiratory failure with hypoxia (HCC), Cirrhosis (HCC), Diabetes mellitus (HCC), Epididymoorchitis, GERD (gastroesophageal reflux disease), H/O cardiac catheterization, Hepatitis, Hiatal hernia, History of alcohol abuse, Hyperlipidemia, Hypertension, IBS (irritable bowel syndrome), Incisional hernia with obstruction but no gangrene, Klebsiella sepsis ([facility]), Metabolic encephalopathy, Morbid obesity ([facility]), Neuropathy, On home oxygen therapy, On home oxygen therapy, Pancreatitis, Poisoning by insulin and oral hypoglycemic (antidiabetic) drugs, accidental (unintentional), initial encounter, Primary osteoarthritis of right knee, Retention, urine, SBO (small bowel obstruction) ([facility]), Secondary hypercoagulable state (HCC), Septic shock ([facility]), Sleep apnea, Sleep apnea, obstructive, Thyroid disease, Under care of team, Urinary bladder neurogenic dysfunction, and Urinary tract infection associated with indwelling urethral catheter ([facility])..  Currently hospitalized for the management of septic shock.  The Palliative Care Team is following to assist with patient and family support and goals of

## 2024-01-10 NOTE — PROGRESS NOTES
Comprehensive Nutrition Assessment    Type and Reason for Visit:  Reassess    Nutrition Recommendations/Plan:   Continue Immune Enhancing TF at 25 mL/hr.  As able, suggest slowly advancing to goal 50 mL/hr with current rate of propofol.  If propofol discontinued, suggest goal rate of 60 mL/hr.  Discontinue TPN after current bag runs out today.  Discussed with RN about cutting TPN rate in half this afternoon and running until bag runs out.  Will monitor TF tolerance/adequacy of intakes, labs, weights, bowel function, and plan of care.     Malnutrition Assessment:  Malnutrition Status:  Insufficient data (01/03/24 1416)    Context:  Acute Illness       Nutrition Assessment:    Pt remains intubated via trach.  Propofol restarted - running at 19.5 mL/hr.  TF of Immune Enhancing formula continues at 25 mL/hr.  RN unsure of plans for TF advancement.  TPN order discontinued last night on accident by resident but TPN continues to run at 57.7 mL/hr.  RN reports plans to discontinue TPN after current bag runs out - discussed with RN about cutting TPN rate in half this afternoon before discontinuing this evening.  No recorded BM noted.  Labs reviewed: Glucose 178-237 mg/dL.  Meds include: Colace, Lantus, Humalog SS, Senokot.    Nutrition Related Findings:    labs/meds reviewed.  +1 facial edema.  No BM since admission.   Wound Type: Surgical Incision, Wound Vac (to abdomen)       Current Nutrition Intake & Therapies:    Average Meal Intake: NPO  Average Supplements Intake: NPO  Diet NPO Exceptions are: Sips of Water with Meds  ADULT TUBE FEEDING; Nasogastric; Immune Enhancing; Continuous; 25; No; 30; Q 4 hours  Current Tube Feeding (TF) Orders:  Feeding Route: Nasogastric  Formula: Immune Enhancing  Schedule: Continuous  Feeding Regimen: 25 mL/hr  Additives/Modulars: None  Water Flushes: 30 mL q 4 hrs  Current TF & Flush Orders Provides: At 25 mL/hr = 900 kcals, 56 gm protein  Goal TF & Flush Orders Provides: Immune Enhancing  Goal(s)  Goals: Meet at least 75% of estimated needs, prior to discharge       Nutrition Monitoring and Evaluation:   Behavioral-Environmental Outcomes: None Identified  Food/Nutrient Intake Outcomes: Parenteral Nutrition Intake/Tolerance, Enteral Nutrition Intake/Tolerance  Physical Signs/Symptoms Outcomes: Biochemical Data, GI Status, Fluid Status or Edema, Hemodynamic Status, Weight, Skin, Nutrition Focused Physical Findings    Discharge Planning:    Too soon to determine     Sally Mc RD, LD  Contact: [phone]

## 2024-01-10 NOTE — PLAN OF CARE
Problem: Discharge Planning  Goal: Discharge to home or other facility with appropriate resources  1/10/2024 1134 by Venkata Lino RN  Outcome: Progressing  1/9/2024 2307 by Amanda Ford RN  Outcome: Progressing  Flowsheets (Taken 1/9/2024 2000)  Discharge to home or other facility with appropriate resources:   Identify barriers to discharge with patient and caregiver   Arrange for needed discharge resources and transportation as appropriate     Problem: Pain  Goal: Verbalizes/displays adequate comfort level or baseline comfort level  1/10/2024 1134 by Venkata Lino RN  Outcome: Progressing  Flowsheets (Taken 1/10/2024 0000 by Liliana, Amanda Mireya, RN)  Verbalizes/displays adequate comfort level or baseline comfort level: Assess pain using appropriate pain scale  1/9/2024 2307 by Amanda Ford RN  Outcome: Progressing  Flowsheets  Taken 1/9/2024 2000 by Amanda Ford RN  Verbalizes/displays adequate comfort level or baseline comfort level: Assess pain using appropriate pain scale  Taken 1/9/2024 1600 by Rahel Romeo RN  Verbalizes/displays adequate comfort level or baseline comfort level: Encourage patient to monitor pain and request assistance     Problem: Safety - Adult  Goal: Free from fall injury  1/10/2024 1134 by Venkata Lino RN  Outcome: Progressing  1/9/2024 2307 by Amanda Ford RN  Outcome: Progressing     Problem: Respiratory - Adult  Goal: Achieves optimal ventilation and oxygenation  1/10/2024 0735 by Sabine Bruner RCP  Outcome: Progressing  1/9/2024 2307 by Amanda Ford RN  Outcome: Progressing     Problem: Chronic Conditions and Co-morbidities  Goal: Patient's chronic conditions and co-morbidity symptoms are monitored and maintained or improved  1/9/2024 2307 by Amanda Ford RN  Outcome: Progressing  Flowsheets (Taken 1/9/2024 2000)  Care Plan - Patient's Chronic Conditions and Co-Morbidity Symptoms are Monitored and Maintained or

## 2024-01-10 NOTE — PROGRESS NOTES
[facility] -   Infectious diseases evaluation  admission date 1/1/2024    reason for consultation:   Necrotizing Faciitis    Impression :   Current:  1/1/24 Necrotizing faciitis 2/2 Polymicrobial infection with T2DM and PEG tube dislodgement and infected   LEONARDA on CKD   1/2/24 S/p partial wedge gastrectomy due to infected PEG tube and dislodgement   1/2/24 S/p debridement of abdomen and indwelling mesh removal,  due to concern for necrotizing fasciitis, wound vac in place  1/3/24 GASTROSTOMY TUBE PLACEMENT, REPAIR OF LARGE VENTRAL DEFECT   Alcoholic Liver cirrhosis - found intra op  Elevated CRP  Lactic acidosis- initial 4.1 now 6.2  Bandemia leukemoid reaction- WBC 15 to 38 to 27 -45  Proteus UTI  RLL pseudomonas pneumonia     Other:    Discussion / summary of stay / plan of care     Recommendations     Respiratory culture: pseudomonas multiS 1/1/24  U cx proteus multiS  Bcx pending - SCN x 1  is a contamination  MRSA swab and COVID pending   1/5 abd wound GNR EF and yeast- more ID to come  Leukemoid reaction better 16 -17 but stalling  Zyvox stopped 1/6 due to thrombocytopenia  Abd wound prep  1/8    Keep meropenem - and add micafungin  1/9/24 due to persistent bandemia,  to cover the abd per cx, and watch WBC response  WBC better 1/10 - will keep AB combo till 1/13 and ask GS about the wound current condition  Reconsiled    Avoid fluconazole due to amiodarone  CT abd suggesting bilat LL atelectasis rather than  pneumonia   Micro to run further the cx of the abd wall  of 1/5/24  Plts dropping further, might need HIT panel        Infection Control Recommendations   [place] Precautions  Contact Isolation       Antimicrobial Stewardship Recommendations   Simplification of therapy  Targeted therapy    History of Present Illness:   Initial history:  Ruben Dimas is a 58 y.o.-year-old male presents from [facility] for complaint of chest pain/abdominal  education level: Not on file   Occupational History    Not on file   Tobacco Use    Smoking status: Never    Smokeless tobacco: Never   Vaping Use    Vaping Use: Never used   Substance and Sexual Activity    Alcohol use: No     Comment: quit drinking 2012    Drug use: No    Sexual activity: Not on file   Other Topics Concern    Not on file   Social History Narrative    Not on file     Social Determinants of Health     Financial Resource Strain: Not on file   Food Insecurity: Not on file   Transportation Needs: Not on file   Physical Activity: Not on file   Stress: Not on file   Social Connections: Not on file   Intimate Partner Violence: Not on file   Housing Stability: Not on file       Family History:     Family History   Adopted: Yes   Problem Relation Age of Onset    No Known Problems Mother         Adopted    No Known Problems Father         Adopted      Medical Decision Making:   I have independently reviewed/ordered the following labs:    CBC with Differential:   Recent Labs     01/09/24  0404 01/10/24  0532   WBC 17.3* 12.4*   HGB 8.3* 7.7*   HCT 26.3* 24.7*   PLT 73* 82*   LYMPHOPCT 4* 8*   MONOPCT 4 4       BMP:  Recent Labs     01/09/24  0404 01/10/24  0532    140   K 4.3 3.7   * 113*   CO2 16* 19*   BUN 36* 40*   CREATININE 0.9 0.9   MG 2.4 2.5       Hepatic Function Panel:   Recent Labs     01/08/24  0452 01/10/24  0532   PROT 5.3* 5.0*   LABALBU 2.6* 2.1*   BILIDIR 0.8* 1.0*   IBILI 0.4 0.5   BILITOT 1.2 1.5*   ALKPHOS 200* 188*   ALT 15 13   AST 23 19       No results for input(s): \"RPR\" in the last 72 hours.  No results for input(s): \"HIV\" in the last 72 hours.  No results for input(s): \"BC\" in the last 72 hours.  Lab Results   Component Value Date/Time    CREATININE 0.9 01/10/2024 05:32 AM    GLUCOSE 237 01/10/2024 05:32 AM    GLUCOSE 102 08/30/2011 02:20 PM       Detailed results:        Thank you for allowing us to participate in the care of this patient.Please call with

## 2024-01-10 NOTE — PLAN OF CARE
Problem: Discharge Planning  Goal: Discharge to home or other facility with appropriate resources  Outcome: Progressing  Flowsheets (Taken 1/9/2024 2000)  Discharge to home or other facility with appropriate resources:   Identify barriers to discharge with patient and caregiver   Arrange for needed discharge resources and transportation as appropriate     Problem: Pain  Goal: Verbalizes/displays adequate comfort level or baseline comfort level  Outcome: Progressing  Flowsheets  Taken 1/9/2024 2000 by Amanda Ford RN  Verbalizes/displays adequate comfort level or baseline comfort level: Assess pain using appropriate pain scale  Taken 1/9/2024 1600 by Rahel Romeo RN  Verbalizes/displays adequate comfort level or baseline comfort level: Encourage patient to monitor pain and request assistance     Problem: Safety - Adult  Goal: Free from fall injury  Outcome: Progressing     Problem: Respiratory - Adult  Goal: Achieves optimal ventilation and oxygenation  1/9/2024 2307 by Amanda Ford RN  Outcome: Progressing  1/9/2024 2004 by Nevaeh Castle RCP  Outcome: Progressing  Flowsheets (Taken 1/9/2024 2000 by Amanda Ford RN)  Achieves optimal ventilation and oxygenation:   Assess for changes in respiratory status   Assess for changes in mentation and behavior  1/9/2024 0930 by Sabine Bruner RCP  Outcome: Progressing     Problem: Chronic Conditions and Co-morbidities  Goal: Patient's chronic conditions and co-morbidity symptoms are monitored and maintained or improved  Outcome: Progressing  Flowsheets (Taken 1/9/2024 2000)  Care Plan - Patient's Chronic Conditions and Co-Morbidity Symptoms are Monitored and Maintained or Improved: Monitor and assess patient's chronic conditions and comorbid symptoms for stability, deterioration, or improvement     Problem: Skin/Tissue Integrity  Goal: Absence of new skin breakdown  Description: 1.  Monitor for areas of redness and/or skin breakdown  2.   Assess vascular access sites hourly  3.  Every 4-6 hours minimum:  Change oxygen saturation probe site  4.  Every 4-6 hours:  If on nasal continuous positive airway pressure, respiratory therapy assess nares and determine need for appliance change or resting period.  Outcome: Progressing     Problem: ABCDS Injury Assessment  Goal: Absence of physical injury  Outcome: Progressing     Problem: Nutrition Deficit:  Goal: Optimize nutritional status  Outcome: Progressing  Flowsheets (Taken 1/9/2024 1440 by Sally Mc, RD, LD)  Nutrient intake appropriate for improving, restoring, or maintaining nutritional needs:   Assess nutritional status and recommend course of action   Recommend, monitor, and adjust tube feedings and TPN/PPN based on assessed needs

## 2024-01-10 NOTE — PROGRESS NOTES
ICU PROGRESS NOTE        PATIENT NAME: Ruben Dimas  MEDICAL RECORD NO. [id number]  DATE: 1/10/2024    PRIMARY CARE PHYSICIAN: Ramy Lazo MD    HD: # 9    ASSESSMENT    Patient Active Problem List   Diagnosis    Alcoholic cirrhosis of liver (HCC), sober since 2013    Peripheral edema    Bipolar disorder (HCC)    Type 2 diabetes mellitus with diabetic neuropathy, with long-term current use of insulin (HCC)    Essential hypertension, currently hypotensive    Dyslipidemia    Weakness    Acute metabolic encephalopathy    FABI (obstructive sleep apnea)    Primary osteoarthritis of right knee    Type 2 diabetes mellitus with diabetic neuropathy (HCC)    Acquired hypothyroidism    Urine retention    Anemia    Slow transit constipation    Iron deficiency anemia due to chronic blood loss    Gastroesophageal reflux disease without esophagitis    LEONARDA (acute kidney injury) (HCC)    Secondary esophageal varices without bleeding (HCC)    Portal hypertension (HCC)    Obesity, morbid, BMI 50 or higher (HCC)    Venous stasis dermatitis of both lower extremities    Cellulitis of perineum    Chronic indwelling Clemons catheter    Anxiety and depression    Back pain, chronic    BPH (benign prostatic hyperplasia)    Chronic diastolic CHF (congestive heart failure) (HCC)    Cirrhosis (HCC)    Diabetes mellitus (HCC)    GERD (gastroesophageal reflux disease)    Hepatitis    Hiatal hernia    Hypertension, essential    IBS (irritable bowel syndrome)    Morbid obesity with BMI of 45.0-49.9, adult (HCC)    Neuropathy    Chronic respiratory failure with hypoxia, on home oxygen therapy (HCC)    Sleep apnea    Thyroid disease    Urinary bladder neurogenic dysfunction    Acute UTI    Musculoskeletal immobility    Pyocystis    Myoclonic jerking    Thrombocytopenia (HCC)    Hypotension    Sepsis with acute hypercapnic respiratory failure and septic shock (HCC)    Right lower lobe pneumonia    Acute kidney injury superimposed on CKD (HCC)     Somnolence    Acute respiratory acidosis (HCC)    Lymphedema of both lower extremities    Venous stasis    Acute GI bleeding    PAF (paroxysmal atrial fibrillation) (HCC)    Secondary hypercoagulable state (HCC)    Anemia due to acute blood loss    Altered mental status    Intertriginous dermatitis associated with moisture    Dermatitis associated with moisture    Hyponatremia    Wounds, multiple    Sepsis due to Proteus species ([facility])    Hyperkalemia    Hypoglycemia    Acute on chronic respiratory failure with hypoxia (HCC)    Encounter for palliative care    Refeeding syndrome    Septic shock (HCC)    Acute heart failure with preserved ejection fraction (HFpEF) ([facility])    Acute cystitis without hematuria    S/P percutaneous endoscopic gastrostomy (PEG) tube placement ([facility])    Necrotizing soft tissue infection    Lactic acidosis    CRP elevated    Bandemia    Goals of care, counseling/discussion    Atrial fibrillation with RVR ([facility])    Dislodged gastrostomy tube    Pneumonia of left lower lobe due to Pseudomonas species ([facility])    Leukemoid reaction    Chronic diastolic heart failure (HCC)    Acute heart failure with preserved ejection fraction (HCC)         MEDICAL DECISION MAKING AND PLAN  NEURO:   -MMT: tylenol, Roxicodone 10mg q4h; Fentanyl gtt 200 mcg/hr  -Psych: Seroquel 50 mg BID  CV:  -SBPs: 103-154; Had one episode of SBP of 173 that resolved without antihypertensives  -MAP: 65-89  -HR: 71-87  -Levophed 6 mcg/min  - Paroxysmal a fib- rate controlled, off amio gtt  - Cardiology recs Dig 125 mcg daily prn for HR > 110  -PO amio  HEME:   -Hgb: 7.7 (8.3) downtrending   -Platelets: 82 (73)   -1u PRBCs required for symptomatic anemia on     RESP:   -Trached   -AC/PRVC: 16/600/30%/8   -AB.294/38.7/102.4/18.8   -LA: 0.9   -CXR showing basilar opacities with R effusion    GI  -Diet: Tube feed; Continuous @ 25 mL/hr; Free water flush 30 mL q4h  -Bowel regimen: senokot, colace  -S/p irrigation and debridement of

## 2024-01-10 NOTE — CARE COORDINATION
Transitional plan:  Called Richi [phone].  Discussed LTACH possibilities.  Reviewed in detail LTACH choice list with star ratings over the phone with Richi.  Choice is [facility] and Second choice is [facility].  Does not want [facility].  Call placed to Hoa at [facility]; MIRTHA left on Hoa's confidential line to notify this is first choice.  Await acceptance.

## 2024-01-10 NOTE — PLAN OF CARE
Problem: Respiratory - Adult  Goal: Achieves optimal ventilation and oxygenation  1/10/2024 0735 by Sabine Bruner RCP  Outcome: Progressing  1/9/2024 2307 by Amanda Ford, RN  Outcome: Progressing  1/9/2024 2004 by Nevaeh Castle RCP  Outcome: Progressing  Flowsheets (Taken 1/9/2024 2000 by Amanda Ford, RN)  Achieves optimal ventilation and oxygenation:   Assess for changes in respiratory status   Assess for changes in mentation and behavior

## 2024-01-11 ENCOUNTER — APPOINTMENT (OUTPATIENT)
Dept: GENERAL RADIOLOGY | Age: 59
DRG: 853 | End: 2024-01-11
Payer: MEDICARE

## 2024-01-11 LAB
ALLEN TEST: ABNORMAL
ALLEN TEST: ABNORMAL
ANION GAP SERPL CALCULATED.3IONS-SCNC: 11 MMOL/L (ref 9–17)
BASOPHILS # BLD: 0 K/UL (ref 0–0.2)
BASOPHILS NFR BLD: 0 % (ref 0–2)
BUN SERPL-MCNC: 42 MG/DL (ref 6–20)
CA-I BLD-SCNC: 1.36 MMOL/L (ref 1.13–1.33)
CALCIUM SERPL-MCNC: 9.3 MG/DL (ref 8.6–10.4)
CHLORIDE SERPL-SCNC: 113 MMOL/L (ref 98–107)
CO2 SERPL-SCNC: 17 MMOL/L (ref 20–31)
CREAT SERPL-MCNC: 0.9 MG/DL (ref 0.7–1.2)
EOSINOPHIL # BLD: 0.45 K/UL (ref 0–0.4)
EOSINOPHILS RELATIVE PERCENT: 2 % (ref 1–4)
ERYTHROCYTE [DISTWIDTH] IN BLOOD BY AUTOMATED COUNT: 19.4 % (ref 11.8–14.4)
FIO2: 40
FIO2: 40
GFR SERPL CREATININE-BSD FRML MDRD: >60 ML/MIN/1.73M2
GLUCOSE BLD-MCNC: 115 MG/DL (ref 75–110)
GLUCOSE BLD-MCNC: 124 MG/DL (ref 75–110)
GLUCOSE BLD-MCNC: 131 MG/DL (ref 75–110)
GLUCOSE BLD-MCNC: 134 MG/DL (ref 75–110)
GLUCOSE BLD-MCNC: 139 MG/DL (ref 74–100)
GLUCOSE BLD-MCNC: 155 MG/DL (ref 74–100)
GLUCOSE BLD-MCNC: 157 MG/DL (ref 75–110)
GLUCOSE BLD-MCNC: 160 MG/DL (ref 75–110)
GLUCOSE SERPL-MCNC: 164 MG/DL (ref 70–99)
HCT VFR BLD AUTO: 24 % (ref 40.7–50.3)
HGB BLD-MCNC: 7.6 G/DL (ref 13–17)
IMM GRANULOCYTES # BLD AUTO: 0.45 K/UL (ref 0–0.3)
IMM GRANULOCYTES NFR BLD: 2 %
LYMPHOCYTES NFR BLD: 1.34 K/UL (ref 1–4.8)
LYMPHOCYTES RELATIVE PERCENT: 6 % (ref 24–44)
MAGNESIUM SERPL-MCNC: 2.5 MG/DL (ref 1.6–2.6)
MCH RBC QN AUTO: 29.8 PG (ref 25.2–33.5)
MCHC RBC AUTO-ENTMCNC: 31.7 G/DL (ref 28.4–34.8)
MCV RBC AUTO: 94.1 FL (ref 82.6–102.9)
MODE: ABNORMAL
MODE: ABNORMAL
MONOCYTES NFR BLD: 0.89 K/UL (ref 0.1–0.8)
MONOCYTES NFR BLD: 4 % (ref 1–7)
MORPHOLOGY: ABNORMAL
NEGATIVE BASE EXCESS, ART: 7.3 MMOL/L (ref 0–2)
NEGATIVE BASE EXCESS, ART: 8.4 MMOL/L (ref 0–2)
NEUTROPHILS NFR BLD: 86 % (ref 36–66)
NEUTS SEG NFR BLD: 19.17 K/UL (ref 1.8–7.7)
NRBC BLD-RTO: 0 PER 100 WBC
O2 DELIVERY DEVICE: ABNORMAL
O2 DELIVERY DEVICE: ABNORMAL
PHOSPHATE SERPL-MCNC: 3.9 MG/DL (ref 2.5–4.5)
PLATELET # BLD AUTO: 145 K/UL (ref 138–453)
PMV BLD AUTO: 10.1 FL (ref 8.1–13.5)
POC HCO3: 18.5 MMOL/L (ref 21–28)
POC HCO3: 18.8 MMOL/L (ref 21–28)
POC LACTIC ACID: 0.6 MMOL/L (ref 0.56–1.39)
POC LACTIC ACID: 0.7 MMOL/L (ref 0.56–1.39)
POC O2 SATURATION: 99.2 % (ref 94–98)
POC O2 SATURATION: 99.3 % (ref 94–98)
POC PCO2: 39.3 MM HG (ref 35–48)
POC PCO2: 43.6 MM HG (ref 35–48)
POC PH: 7.24 (ref 7.35–7.45)
POC PH: 7.29 (ref 7.35–7.45)
POC PO2: 169.1 MM HG (ref 83–108)
POC PO2: 170.1 MM HG (ref 83–108)
POTASSIUM SERPL-SCNC: 3.9 MMOL/L (ref 3.7–5.3)
PROCALCITONIN SERPL-MCNC: 1.72 NG/ML
RBC # BLD AUTO: 2.55 M/UL (ref 4.21–5.77)
SAMPLE SITE: ABNORMAL
SAMPLE SITE: ABNORMAL
SODIUM SERPL-SCNC: 141 MMOL/L (ref 135–144)
WBC OTHER # BLD: 22.3 K/UL (ref 3.5–11.3)

## 2024-01-11 PROCEDURE — 6360000002 HC RX W HCPCS: Performed by: STUDENT IN AN ORGANIZED HEALTH CARE EDUCATION/TRAINING PROGRAM

## 2024-01-11 PROCEDURE — 37799 UNLISTED PX VASCULAR SURGERY: CPT

## 2024-01-11 PROCEDURE — 94761 N-INVAS EAR/PLS OXIMETRY MLT: CPT

## 2024-01-11 PROCEDURE — 6370000000 HC RX 637 (ALT 250 FOR IP): Performed by: INTERNAL MEDICINE

## 2024-01-11 PROCEDURE — 85025 COMPLETE CBC W/AUTO DIFF WBC: CPT

## 2024-01-11 PROCEDURE — 84145 PROCALCITONIN (PCT): CPT

## 2024-01-11 PROCEDURE — 99232 SBSQ HOSP IP/OBS MODERATE 35: CPT | Performed by: SURGERY

## 2024-01-11 PROCEDURE — 71045 X-RAY EXAM CHEST 1 VIEW: CPT

## 2024-01-11 PROCEDURE — 36415 COLL VENOUS BLD VENIPUNCTURE: CPT

## 2024-01-11 PROCEDURE — 2000000000 HC ICU R&B

## 2024-01-11 PROCEDURE — 6360000002 HC RX W HCPCS: Performed by: NURSE PRACTITIONER

## 2024-01-11 PROCEDURE — 2580000003 HC RX 258: Performed by: INTERNAL MEDICINE

## 2024-01-11 PROCEDURE — 84100 ASSAY OF PHOSPHORUS: CPT

## 2024-01-11 PROCEDURE — 6360000002 HC RX W HCPCS

## 2024-01-11 PROCEDURE — 87040 BLOOD CULTURE FOR BACTERIA: CPT

## 2024-01-11 PROCEDURE — 82330 ASSAY OF CALCIUM: CPT

## 2024-01-11 PROCEDURE — 2580000003 HC RX 258

## 2024-01-11 PROCEDURE — 6370000000 HC RX 637 (ALT 250 FOR IP): Performed by: STUDENT IN AN ORGANIZED HEALTH CARE EDUCATION/TRAINING PROGRAM

## 2024-01-11 PROCEDURE — 6370000000 HC RX 637 (ALT 250 FOR IP): Performed by: EMERGENCY MEDICINE

## 2024-01-11 PROCEDURE — 99232 SBSQ HOSP IP/OBS MODERATE 35: CPT | Performed by: INTERNAL MEDICINE

## 2024-01-11 PROCEDURE — 6360000002 HC RX W HCPCS: Performed by: INTERNAL MEDICINE

## 2024-01-11 PROCEDURE — 2700000000 HC OXYGEN THERAPY PER DAY

## 2024-01-11 PROCEDURE — 94003 VENT MGMT INPAT SUBQ DAY: CPT

## 2024-01-11 PROCEDURE — 80048 BASIC METABOLIC PNL TOTAL CA: CPT

## 2024-01-11 PROCEDURE — 82803 BLOOD GASES ANY COMBINATION: CPT

## 2024-01-11 PROCEDURE — 83735 ASSAY OF MAGNESIUM: CPT

## 2024-01-11 PROCEDURE — 83605 ASSAY OF LACTIC ACID: CPT

## 2024-01-11 PROCEDURE — 82947 ASSAY GLUCOSE BLOOD QUANT: CPT

## 2024-01-11 RX ORDER — DOXYCYCLINE HYCLATE 100 MG
100 TABLET ORAL EVERY 12 HOURS SCHEDULED
Status: DISCONTINUED | OUTPATIENT
Start: 2024-01-11 | End: 2024-01-14

## 2024-01-11 RX ORDER — FENTANYL CITRATE 50 UG/ML
100 INJECTION, SOLUTION INTRAMUSCULAR; INTRAVENOUS
Status: DISCONTINUED | OUTPATIENT
Start: 2024-01-11 | End: 2024-01-15

## 2024-01-11 RX ADMIN — OXYCODONE HYDROCHLORIDE 10 MG: 5 TABLET ORAL at 17:43

## 2024-01-11 RX ADMIN — HEPARIN SODIUM 7500 UNITS: 5000 INJECTION INTRAVENOUS; SUBCUTANEOUS at 16:39

## 2024-01-11 RX ADMIN — LANSOPRAZOLE 30 MG: 30 TABLET, ORALLY DISINTEGRATING, DELAYED RELEASE ORAL at 05:30

## 2024-01-11 RX ADMIN — DOCUSATE SODIUM LIQUID 100 MG: 100 LIQUID ORAL at 21:07

## 2024-01-11 RX ADMIN — DOXYCYCLINE HYCLATE 100 MG: 100 TABLET, COATED ORAL at 21:30

## 2024-01-11 RX ADMIN — ASPIRIN 81 MG 81 MG: 81 TABLET ORAL at 07:36

## 2024-01-11 RX ADMIN — AMIODARONE HYDROCHLORIDE 200 MG: 200 TABLET ORAL at 21:07

## 2024-01-11 RX ADMIN — QUETIAPINE FUMARATE 50 MG: 25 TABLET ORAL at 07:36

## 2024-01-11 RX ADMIN — OXYCODONE HYDROCHLORIDE 10 MG: 5 TABLET ORAL at 21:07

## 2024-01-11 RX ADMIN — SENNOSIDES 8.8 MG: 8.8 LIQUID ORAL at 21:07

## 2024-01-11 RX ADMIN — HEPARIN SODIUM 7500 UNITS: 5000 INJECTION INTRAVENOUS; SUBCUTANEOUS at 05:29

## 2024-01-11 RX ADMIN — HEPARIN SODIUM 7500 UNITS: 5000 INJECTION INTRAVENOUS; SUBCUTANEOUS at 21:08

## 2024-01-11 RX ADMIN — OXYCODONE HYDROCHLORIDE 10 MG: 5 TABLET ORAL at 07:36

## 2024-01-11 RX ADMIN — INSULIN GLARGINE 30 UNITS: 100 INJECTION, SOLUTION SUBCUTANEOUS at 21:08

## 2024-01-11 RX ADMIN — SENNOSIDES 8.8 MG: 8.8 LIQUID ORAL at 07:36

## 2024-01-11 RX ADMIN — MEROPENEM 1000 MG: 1 INJECTION, POWDER, FOR SOLUTION INTRAVENOUS at 13:40

## 2024-01-11 RX ADMIN — OXYCODONE HYDROCHLORIDE 10 MG: 5 TABLET ORAL at 13:50

## 2024-01-11 RX ADMIN — ACETAMINOPHEN 1000 MG: 650 SOLUTION ORAL at 21:07

## 2024-01-11 RX ADMIN — FENTANYL CITRATE 100 MCG: 50 INJECTION, SOLUTION INTRAMUSCULAR; INTRAVENOUS at 19:58

## 2024-01-11 RX ADMIN — DOCUSATE SODIUM LIQUID 100 MG: 100 LIQUID ORAL at 07:36

## 2024-01-11 RX ADMIN — ACETAMINOPHEN 1000 MG: 650 SOLUTION ORAL at 05:28

## 2024-01-11 RX ADMIN — QUETIAPINE FUMARATE 50 MG: 25 TABLET ORAL at 21:07

## 2024-01-11 RX ADMIN — ONDANSETRON 4 MG: 2 INJECTION INTRAMUSCULAR; INTRAVENOUS at 22:43

## 2024-01-11 RX ADMIN — OXYCODONE HYDROCHLORIDE 10 MG: 5 TABLET ORAL at 05:30

## 2024-01-11 RX ADMIN — MEROPENEM 1000 MG: 1 INJECTION, POWDER, FOR SOLUTION INTRAVENOUS at 05:51

## 2024-01-11 RX ADMIN — MEROPENEM 1000 MG: 1 INJECTION, POWDER, FOR SOLUTION INTRAVENOUS at 21:34

## 2024-01-11 RX ADMIN — FENTANYL CITRATE 100 MCG: 50 INJECTION, SOLUTION INTRAMUSCULAR; INTRAVENOUS at 21:08

## 2024-01-11 RX ADMIN — MICAFUNGIN SODIUM 100 MG: 100 INJECTION, POWDER, LYOPHILIZED, FOR SOLUTION INTRAVENOUS at 07:50

## 2024-01-11 RX ADMIN — AMIODARONE HYDROCHLORIDE 200 MG: 200 TABLET ORAL at 07:36

## 2024-01-11 RX ADMIN — LEVOTHYROXINE SODIUM 200 MCG: 100 TABLET ORAL at 05:31

## 2024-01-11 RX ADMIN — INSULIN GLARGINE 30 UNITS: 100 INJECTION, SOLUTION SUBCUTANEOUS at 07:36

## 2024-01-11 RX ADMIN — LANSOPRAZOLE 30 MG: 30 TABLET, ORALLY DISINTEGRATING, DELAYED RELEASE ORAL at 16:39

## 2024-01-11 RX ADMIN — OXYCODONE HYDROCHLORIDE 10 MG: 5 TABLET ORAL at 00:27

## 2024-01-11 RX ADMIN — ALPRAZOLAM 0.25 MG: 0.25 TABLET ORAL at 21:08

## 2024-01-11 RX ADMIN — ACETAMINOPHEN 1000 MG: 650 SOLUTION ORAL at 13:51

## 2024-01-11 ASSESSMENT — PULMONARY FUNCTION TESTS
PIF_VALUE: 16
PIF_VALUE: 17
PIF_VALUE: 16
PIF_VALUE: 16
PIF_VALUE: 23
PIF_VALUE: 24
PIF_VALUE: 23
PIF_VALUE: 16

## 2024-01-11 ASSESSMENT — PAIN SCALES - GENERAL
PAINLEVEL_OUTOF10: 10
PAINLEVEL_OUTOF10: 8

## 2024-01-11 ASSESSMENT — PAIN DESCRIPTION - LOCATION: LOCATION: ABDOMEN

## 2024-01-11 NOTE — PLAN OF CARE
Problem: Discharge Planning  Goal: Discharge to home or other facility with appropriate resources  Outcome: Progressing     Problem: Pain  Goal: Verbalizes/displays adequate comfort level or baseline comfort level  Outcome: Progressing     Problem: Safety - Adult  Goal: Free from fall injury  Outcome: Progressing     Problem: Respiratory - Adult  Goal: Achieves optimal ventilation and oxygenation  1/11/2024 0253 by Agueda Aldridge RN  Outcome: Progressing     Problem: Chronic Conditions and Co-morbidities  Goal: Patient's chronic conditions and co-morbidity symptoms are monitored and maintained or improved  Outcome: Progressing     Problem: ABCDS Injury Assessment  Goal: Absence of physical injury  Outcome: Progressing

## 2024-01-11 NOTE — PROGRESS NOTES
[facility] -   Infectious diseases evaluation  admission date 1/1/2024    reason for consultation:   Necrotizing Faciitis    Impression :   Current:  1/1/24 Necrotizing faciitis 2/2 Polymicrobial infection with T2DM and PEG tube dislodgement and infected   LEONARDA on CKD   1/2/24 S/p partial wedge gastrectomy due to infected PEG tube and dislodgement   1/2/24 S/p debridement of abdomen and indwelling mesh removal,  due to concern for necrotizing fasciitis, wound vac in place  1/3/24 GASTROSTOMY TUBE PLACEMENT, REPAIR OF LARGE VENTRAL DEFECT   Alcoholic Liver cirrhosis - found intra op  Elevated CRP  Lactic acidosis- initial 4.1 now 6.2  Bandemia leukemoid reaction- WBC 15 to 38 to 27 -45  Proteus UTI  RLL pseudomonas pneumonia     Other:    Discussion / summary of stay / plan of care     Recommendations     Respiratory culture: pseudomonas multiS 1/1/24 -treated  U cx proteus multiS  Bcx pending - SCN x 1  is a contamination  MRSA swab and COVID pending   1/5 abd wound GNR EF and yeast- more ID to come  Leukemoid reaction better 16 -17 but stalling  Zyvox stopped 1/6 due to thrombocytopenia  Abd wound prep  1/8    Keep meropenem 1/1 -1/13 -   and add micafungin  1/9/24 till 1/13 due to persistent bandemia,  cover the fungus  WBC 1/11 up 22 and R arm iv phlebitis - add doxy    Avoid fluconazole due to amiodarone  CT abd suggesting bilat LL atelectasis rather than  pneumonia   cx of the abd wall  of 1/5/24 enterococcus and kleb and saccharomyces  Plts dropping further, might need HIT panel        Infection Control Recommendations   [place] Precautions  Contact Isolation       Antimicrobial Stewardship Recommendations   Simplification of therapy  Targeted therapy    History of Present Illness:   Initial history:  Ruben Dimas is a 58 y.o.-year-old male presents from extended-care facility via EMS for complaint of chest pain/abdominal pain.  Patient has a history of type 2

## 2024-01-11 NOTE — CARE COORDINATION
Transitional Planning:  Call placed to Carisa at [facility].  Accepting patient.

## 2024-01-11 NOTE — PROGRESS NOTES
01/11/24 1031   Vent Information   Vent Mode (S)  CPAP/PS   Ventilator Settings   PEEP/CPAP (cmH2O) (S)  8   Pressure Support (cm H2O) (S)  8 cm H2O         Per Trauma resident. CPAP for 1 hour and then do ABG.

## 2024-01-11 NOTE — PROGRESS NOTES
ICU PROGRESS NOTE        PATIENT NAME: Ruben Dimas  MEDICAL RECORD NO. [id number]  DATE: 1/11/2024    PRIMARY CARE PHYSICIAN: Ramy Lazo MD    HD: # 10    ASSESSMENT    Patient Active Problem List   Diagnosis    Alcoholic cirrhosis of liver (HCC), sober since 2013    Peripheral edema    Bipolar disorder (HCC)    Type 2 diabetes mellitus with diabetic neuropathy, with long-term current use of insulin (HCC)    Essential hypertension, currently hypotensive    Dyslipidemia    Weakness    Acute metabolic encephalopathy    FABI (obstructive sleep apnea)    Primary osteoarthritis of right knee    Type 2 diabetes mellitus with diabetic neuropathy (HCC)    Acquired hypothyroidism    Urine retention    Anemia    Slow transit constipation    Iron deficiency anemia due to chronic blood loss    Gastroesophageal reflux disease without esophagitis    LEONARDA (acute kidney injury) (HCC)    Secondary esophageal varices without bleeding (HCC)    Portal hypertension (HCC)    Obesity, morbid, BMI 50 or higher (HCC)    Venous stasis dermatitis of both lower extremities    Cellulitis of perineum    Chronic indwelling Clemons catheter    Anxiety and depression    Back pain, chronic    BPH (benign prostatic hyperplasia)    Chronic diastolic CHF (congestive heart failure) (HCC)    Cirrhosis (HCC)    Diabetes mellitus (HCC)    GERD (gastroesophageal reflux disease)    Hepatitis    Hiatal hernia    Hypertension, essential    IBS (irritable bowel syndrome)    Morbid obesity with BMI of 45.0-49.9, adult (HCC)    Neuropathy    Chronic respiratory failure with hypoxia, on home oxygen therapy (HCC)    Sleep apnea    Thyroid disease    Urinary bladder neurogenic dysfunction    Acute UTI    Musculoskeletal immobility    Pyocystis    Myoclonic jerking    Thrombocytopenia (HCC)    Hypotension    Sepsis with acute hypercapnic respiratory failure and septic shock (HCC)    Right lower lobe pneumonia    Acute kidney injury superimposed on CKD (HCC)

## 2024-01-12 ENCOUNTER — APPOINTMENT (OUTPATIENT)
Dept: GENERAL RADIOLOGY | Age: 59
DRG: 853 | End: 2024-01-12
Payer: MEDICARE

## 2024-01-12 LAB
ALBUMIN SERPL-MCNC: 2.2 G/DL (ref 3.5–5.2)
ALBUMIN/GLOB SERPL: 0.6 {RATIO} (ref 1–2.5)
ALP SERPL-CCNC: 323 U/L (ref 40–129)
ALT SERPL-CCNC: 17 U/L (ref 5–41)
ANION GAP SERPL CALCULATED.3IONS-SCNC: 10 MMOL/L (ref 9–17)
AST SERPL-CCNC: 28 U/L
BASOPHILS # BLD: 0 K/UL (ref 0–0.2)
BASOPHILS NFR BLD: 0 % (ref 0–2)
BILIRUB DIRECT SERPL-MCNC: 0.9 MG/DL
BILIRUB INDIRECT SERPL-MCNC: 0.4 MG/DL (ref 0–1)
BILIRUB SERPL-MCNC: 1.3 MG/DL (ref 0.3–1.2)
BUN SERPL-MCNC: 44 MG/DL (ref 6–20)
CA-I BLD-SCNC: 1.38 MMOL/L (ref 1.13–1.33)
CALCIUM SERPL-MCNC: 9.7 MG/DL (ref 8.6–10.4)
CHLORIDE SERPL-SCNC: 117 MMOL/L (ref 98–107)
CO2 SERPL-SCNC: 17 MMOL/L (ref 20–31)
CREAT SERPL-MCNC: 1 MG/DL (ref 0.7–1.2)
EOSINOPHIL # BLD: 0.37 K/UL (ref 0–0.4)
EOSINOPHILS RELATIVE PERCENT: 2 % (ref 1–4)
ERYTHROCYTE [DISTWIDTH] IN BLOOD BY AUTOMATED COUNT: 19.6 % (ref 11.8–14.4)
FIO2: 40
GFR SERPL CREATININE-BSD FRML MDRD: >60 ML/MIN/1.73M2
GLUCOSE BLD-MCNC: 31 MG/DL (ref 75–110)
GLUCOSE BLD-MCNC: 35 MG/DL (ref 74–100)
GLUCOSE BLD-MCNC: 35 MG/DL (ref 75–110)
GLUCOSE BLD-MCNC: 68 MG/DL (ref 75–110)
GLUCOSE BLD-MCNC: 74 MG/DL (ref 75–110)
GLUCOSE BLD-MCNC: 75 MG/DL (ref 75–110)
GLUCOSE BLD-MCNC: 76 MG/DL (ref 75–110)
GLUCOSE BLD-MCNC: 80 MG/DL (ref 75–110)
GLUCOSE BLD-MCNC: 81 MG/DL (ref 75–110)
GLUCOSE BLD-MCNC: 81 MG/DL (ref 75–110)
GLUCOSE BLD-MCNC: 87 MG/DL (ref 75–110)
GLUCOSE BLD-MCNC: 88 MG/DL (ref 75–110)
GLUCOSE SERPL-MCNC: 37 MG/DL (ref 70–99)
HCT VFR BLD AUTO: 27.9 % (ref 40.7–50.3)
HGB BLD-MCNC: 8.7 G/DL (ref 13–17)
IMM GRANULOCYTES # BLD AUTO: 0.37 K/UL (ref 0–0.3)
IMM GRANULOCYTES NFR BLD: 2 %
LYMPHOCYTES NFR BLD: 0.37 K/UL (ref 1–4.8)
LYMPHOCYTES RELATIVE PERCENT: 2 % (ref 24–44)
MAGNESIUM SERPL-MCNC: 2.5 MG/DL (ref 1.6–2.6)
MCH RBC QN AUTO: 29.1 PG (ref 25.2–33.5)
MCHC RBC AUTO-ENTMCNC: 31.2 G/DL (ref 28.4–34.8)
MCV RBC AUTO: 93.3 FL (ref 82.6–102.9)
MICROORGANISM SPEC CULT: ABNORMAL
MICROORGANISM/AGENT SPEC: ABNORMAL
MICROORGANISM/AGENT SPEC: ABNORMAL
MONOCYTES NFR BLD: 0.55 K/UL (ref 0.1–0.8)
MONOCYTES NFR BLD: 3 % (ref 1–7)
MORPHOLOGY: ABNORMAL
NEGATIVE BASE EXCESS, ART: 6.1 MMOL/L (ref 0–2)
NEUTROPHILS NFR BLD: 91 % (ref 36–66)
NEUTS SEG NFR BLD: 16.74 K/UL (ref 1.8–7.7)
NRBC BLD-RTO: 0 PER 100 WBC
PHOSPHATE SERPL-MCNC: 4.8 MG/DL (ref 2.5–4.5)
PLATELET # BLD AUTO: 158 K/UL (ref 138–453)
PMV BLD AUTO: 10.4 FL (ref 8.1–13.5)
POC HCO3: 18.6 MMOL/L (ref 21–28)
POC LACTIC ACID: 0.6 MMOL/L (ref 0.56–1.39)
POC O2 SATURATION: 99.1 % (ref 94–98)
POC PCO2: 32.9 MM HG (ref 35–48)
POC PH: 7.36 (ref 7.35–7.45)
POC PO2: 138.8 MM HG (ref 83–108)
POTASSIUM SERPL-SCNC: 4.3 MMOL/L (ref 3.7–5.3)
PROCALCITONIN SERPL-MCNC: 1.58 NG/ML
PROT SERPL-MCNC: 5.8 G/DL (ref 6.4–8.3)
RBC # BLD AUTO: 2.99 M/UL (ref 4.21–5.77)
SAMPLE SITE: ABNORMAL
SODIUM SERPL-SCNC: 144 MMOL/L (ref 135–144)
SPECIMEN DESCRIPTION: ABNORMAL
WBC OTHER # BLD: 18.4 K/UL (ref 3.5–11.3)

## 2024-01-12 PROCEDURE — 2700000000 HC OXYGEN THERAPY PER DAY

## 2024-01-12 PROCEDURE — 85025 COMPLETE CBC W/AUTO DIFF WBC: CPT

## 2024-01-12 PROCEDURE — 2580000003 HC RX 258

## 2024-01-12 PROCEDURE — 83605 ASSAY OF LACTIC ACID: CPT

## 2024-01-12 PROCEDURE — 6370000000 HC RX 637 (ALT 250 FOR IP): Performed by: INTERNAL MEDICINE

## 2024-01-12 PROCEDURE — 2500000003 HC RX 250 WO HCPCS: Performed by: STUDENT IN AN ORGANIZED HEALTH CARE EDUCATION/TRAINING PROGRAM

## 2024-01-12 PROCEDURE — 74018 RADEX ABDOMEN 1 VIEW: CPT

## 2024-01-12 PROCEDURE — 6370000000 HC RX 637 (ALT 250 FOR IP): Performed by: STUDENT IN AN ORGANIZED HEALTH CARE EDUCATION/TRAINING PROGRAM

## 2024-01-12 PROCEDURE — 6360000002 HC RX W HCPCS

## 2024-01-12 PROCEDURE — 2580000003 HC RX 258: Performed by: EMERGENCY MEDICINE

## 2024-01-12 PROCEDURE — 99291 CRITICAL CARE FIRST HOUR: CPT | Performed by: NURSE PRACTITIONER

## 2024-01-12 PROCEDURE — 82803 BLOOD GASES ANY COMBINATION: CPT

## 2024-01-12 PROCEDURE — 82947 ASSAY GLUCOSE BLOOD QUANT: CPT

## 2024-01-12 PROCEDURE — 83735 ASSAY OF MAGNESIUM: CPT

## 2024-01-12 PROCEDURE — 37799 UNLISTED PX VASCULAR SURGERY: CPT

## 2024-01-12 PROCEDURE — 94761 N-INVAS EAR/PLS OXIMETRY MLT: CPT

## 2024-01-12 PROCEDURE — 80048 BASIC METABOLIC PNL TOTAL CA: CPT

## 2024-01-12 PROCEDURE — 71045 X-RAY EXAM CHEST 1 VIEW: CPT

## 2024-01-12 PROCEDURE — 6360000002 HC RX W HCPCS: Performed by: EMERGENCY MEDICINE

## 2024-01-12 PROCEDURE — 84145 PROCALCITONIN (PCT): CPT

## 2024-01-12 PROCEDURE — 94003 VENT MGMT INPAT SUBQ DAY: CPT

## 2024-01-12 PROCEDURE — 2580000003 HC RX 258: Performed by: STUDENT IN AN ORGANIZED HEALTH CARE EDUCATION/TRAINING PROGRAM

## 2024-01-12 PROCEDURE — 99233 SBSQ HOSP IP/OBS HIGH 50: CPT | Performed by: INTERNAL MEDICINE

## 2024-01-12 PROCEDURE — 84100 ASSAY OF PHOSPHORUS: CPT

## 2024-01-12 PROCEDURE — 80076 HEPATIC FUNCTION PANEL: CPT

## 2024-01-12 PROCEDURE — 2000000000 HC ICU R&B

## 2024-01-12 PROCEDURE — 6360000002 HC RX W HCPCS: Performed by: STUDENT IN AN ORGANIZED HEALTH CARE EDUCATION/TRAINING PROGRAM

## 2024-01-12 PROCEDURE — 6360000002 HC RX W HCPCS: Performed by: NURSE PRACTITIONER

## 2024-01-12 PROCEDURE — 82330 ASSAY OF CALCIUM: CPT

## 2024-01-12 RX ORDER — DIPHENHYDRAMINE HYDROCHLORIDE 50 MG/ML
12.5 INJECTION INTRAMUSCULAR; INTRAVENOUS ONCE
Status: COMPLETED | OUTPATIENT
Start: 2024-01-12 | End: 2024-01-12

## 2024-01-12 RX ORDER — MAGNESIUM SULFATE IN WATER 40 MG/ML
2000 INJECTION, SOLUTION INTRAVENOUS ONCE
Status: COMPLETED | OUTPATIENT
Start: 2024-01-12 | End: 2024-01-12

## 2024-01-12 RX ORDER — NOREPINEPHRINE BITARTRATE 0.06 MG/ML
1-100 INJECTION, SOLUTION INTRAVENOUS CONTINUOUS
Status: DISCONTINUED | OUTPATIENT
Start: 2024-01-12 | End: 2024-01-17

## 2024-01-12 RX ORDER — DEXTROSE MONOHYDRATE 100 MG/ML
INJECTION, SOLUTION INTRAVENOUS CONTINUOUS PRN
Status: DISCONTINUED | OUTPATIENT
Start: 2024-01-12 | End: 2024-01-17

## 2024-01-12 RX ORDER — METOCLOPRAMIDE HYDROCHLORIDE 5 MG/ML
5 INJECTION INTRAMUSCULAR; INTRAVENOUS EVERY 6 HOURS
Status: COMPLETED | OUTPATIENT
Start: 2024-01-12 | End: 2024-01-17

## 2024-01-12 RX ORDER — PROCHLORPERAZINE EDISYLATE 5 MG/ML
10 INJECTION INTRAMUSCULAR; INTRAVENOUS ONCE
Status: COMPLETED | OUTPATIENT
Start: 2024-01-12 | End: 2024-01-12

## 2024-01-12 RX ORDER — DEXTROSE MONOHYDRATE 25 G/50ML
25 INJECTION, SOLUTION INTRAVENOUS ONCE
Status: DISCONTINUED | OUTPATIENT
Start: 2024-01-12 | End: 2024-01-13

## 2024-01-12 RX ORDER — DEXTROSE MONOHYDRATE 50 MG/ML
INJECTION, SOLUTION INTRAVENOUS CONTINUOUS
Status: DISCONTINUED | OUTPATIENT
Start: 2024-01-12 | End: 2024-01-15

## 2024-01-12 RX ORDER — DEXTROSE MONOHYDRATE 25 G/50ML
INJECTION, SOLUTION INTRAVENOUS
Status: DISCONTINUED
Start: 2024-01-12 | End: 2024-01-12 | Stop reason: WASHOUT

## 2024-01-12 RX ADMIN — ONDANSETRON 4 MG: 2 INJECTION INTRAMUSCULAR; INTRAVENOUS at 16:52

## 2024-01-12 RX ADMIN — DIPHENHYDRAMINE HYDROCHLORIDE 12.5 MG: 50 INJECTION, SOLUTION INTRAMUSCULAR; INTRAVENOUS at 07:05

## 2024-01-12 RX ADMIN — ONDANSETRON 4 MG: 2 INJECTION INTRAMUSCULAR; INTRAVENOUS at 06:45

## 2024-01-12 RX ADMIN — OXYCODONE HYDROCHLORIDE 10 MG: 5 TABLET ORAL at 11:59

## 2024-01-12 RX ADMIN — DOCUSATE SODIUM LIQUID 100 MG: 100 LIQUID ORAL at 22:30

## 2024-01-12 RX ADMIN — Medication 8 MCG/MIN: at 12:01

## 2024-01-12 RX ADMIN — ACETAMINOPHEN 1000 MG: 650 SOLUTION ORAL at 14:02

## 2024-01-12 RX ADMIN — ACETAMINOPHEN 1000 MG: 650 SOLUTION ORAL at 22:30

## 2024-01-12 RX ADMIN — OXYCODONE HYDROCHLORIDE 10 MG: 5 TABLET ORAL at 01:23

## 2024-01-12 RX ADMIN — LANSOPRAZOLE 30 MG: 30 TABLET, ORALLY DISINTEGRATING, DELAYED RELEASE ORAL at 16:22

## 2024-01-12 RX ADMIN — DEXTROSE MONOHYDRATE: 100 INJECTION, SOLUTION INTRAVENOUS at 11:22

## 2024-01-12 RX ADMIN — SENNOSIDES 8.8 MG: 8.8 LIQUID ORAL at 22:30

## 2024-01-12 RX ADMIN — AMIODARONE HYDROCHLORIDE 200 MG: 200 TABLET ORAL at 22:30

## 2024-01-12 RX ADMIN — OXYCODONE HYDROCHLORIDE 10 MG: 5 TABLET ORAL at 16:22

## 2024-01-12 RX ADMIN — SODIUM CHLORIDE, PRESERVATIVE FREE 10 ML: 5 INJECTION INTRAVENOUS at 08:36

## 2024-01-12 RX ADMIN — MAGNESIUM SULFATE HEPTAHYDRATE 2000 MG: 40 INJECTION, SOLUTION INTRAVENOUS at 18:41

## 2024-01-12 RX ADMIN — METOCLOPRAMIDE 5 MG: 5 INJECTION, SOLUTION INTRAMUSCULAR; INTRAVENOUS at 18:18

## 2024-01-12 RX ADMIN — DOXYCYCLINE HYCLATE 100 MG: 100 TABLET, COATED ORAL at 22:25

## 2024-01-12 RX ADMIN — SODIUM CHLORIDE, PRESERVATIVE FREE 10 ML: 5 INJECTION INTRAVENOUS at 22:25

## 2024-01-12 RX ADMIN — LEVOTHYROXINE SODIUM 200 MCG: 100 TABLET ORAL at 08:46

## 2024-01-12 RX ADMIN — DEXTROSE MONOHYDRATE: 50 INJECTION, SOLUTION INTRAVENOUS at 18:19

## 2024-01-12 RX ADMIN — AMIODARONE HYDROCHLORIDE 200 MG: 200 TABLET ORAL at 08:36

## 2024-01-12 RX ADMIN — MEROPENEM 1000 MG: 1 INJECTION, POWDER, FOR SOLUTION INTRAVENOUS at 12:55

## 2024-01-12 RX ADMIN — PROCHLORPERAZINE EDISYLATE 10 MG: 5 INJECTION INTRAMUSCULAR; INTRAVENOUS at 07:05

## 2024-01-12 RX ADMIN — FENTANYL CITRATE 100 MCG: 50 INJECTION, SOLUTION INTRAMUSCULAR; INTRAVENOUS at 05:53

## 2024-01-12 RX ADMIN — MEROPENEM 1000 MG: 1 INJECTION, POWDER, FOR SOLUTION INTRAVENOUS at 22:24

## 2024-01-12 RX ADMIN — DEXTROSE MONOHYDRATE 125 ML: 100 INJECTION, SOLUTION INTRAVENOUS at 05:48

## 2024-01-12 RX ADMIN — ACETAMINOPHEN 1000 MG: 650 SOLUTION ORAL at 05:49

## 2024-01-12 RX ADMIN — DEXTROSE MONOHYDRATE 250 ML: 100 INJECTION, SOLUTION INTRAVENOUS at 08:35

## 2024-01-12 RX ADMIN — DEXTROSE MONOHYDRATE: 100 INJECTION, SOLUTION INTRAVENOUS at 13:51

## 2024-01-12 RX ADMIN — FENTANYL CITRATE 100 MCG: 50 INJECTION, SOLUTION INTRAMUSCULAR; INTRAVENOUS at 10:30

## 2024-01-12 RX ADMIN — QUETIAPINE FUMARATE 50 MG: 25 TABLET ORAL at 08:35

## 2024-01-12 RX ADMIN — ASPIRIN 81 MG 81 MG: 81 TABLET ORAL at 08:35

## 2024-01-12 RX ADMIN — DOXYCYCLINE HYCLATE 100 MG: 100 TABLET, COATED ORAL at 08:40

## 2024-01-12 RX ADMIN — DEXTROSE MONOHYDRATE: 100 INJECTION, SOLUTION INTRAVENOUS at 16:28

## 2024-01-12 RX ADMIN — HEPARIN SODIUM 7500 UNITS: 5000 INJECTION INTRAVENOUS; SUBCUTANEOUS at 14:02

## 2024-01-12 RX ADMIN — OXYCODONE HYDROCHLORIDE 10 MG: 5 TABLET ORAL at 22:30

## 2024-01-12 RX ADMIN — HEPARIN SODIUM 7500 UNITS: 5000 INJECTION INTRAVENOUS; SUBCUTANEOUS at 22:31

## 2024-01-12 RX ADMIN — DEXTROSE MONOHYDRATE: 100 INJECTION, SOLUTION INTRAVENOUS at 10:11

## 2024-01-12 RX ADMIN — OXYCODONE HYDROCHLORIDE 10 MG: 5 TABLET ORAL at 05:49

## 2024-01-12 RX ADMIN — OXYCODONE HYDROCHLORIDE 10 MG: 5 TABLET ORAL at 08:36

## 2024-01-12 RX ADMIN — HEPARIN SODIUM 7500 UNITS: 5000 INJECTION INTRAVENOUS; SUBCUTANEOUS at 05:49

## 2024-01-12 RX ADMIN — FENTANYL CITRATE 100 MCG: 50 INJECTION, SOLUTION INTRAMUSCULAR; INTRAVENOUS at 01:23

## 2024-01-12 RX ADMIN — QUETIAPINE FUMARATE 50 MG: 25 TABLET ORAL at 22:30

## 2024-01-12 RX ADMIN — LANSOPRAZOLE 30 MG: 30 TABLET, ORALLY DISINTEGRATING, DELAYED RELEASE ORAL at 08:35

## 2024-01-12 RX ADMIN — SENNOSIDES 8.8 MG: 8.8 LIQUID ORAL at 08:35

## 2024-01-12 RX ADMIN — MICAFUNGIN SODIUM 100 MG: 100 INJECTION, POWDER, LYOPHILIZED, FOR SOLUTION INTRAVENOUS at 08:43

## 2024-01-12 RX ADMIN — MEROPENEM 1000 MG: 1 INJECTION, POWDER, FOR SOLUTION INTRAVENOUS at 05:57

## 2024-01-12 RX ADMIN — DOCUSATE SODIUM LIQUID 100 MG: 100 LIQUID ORAL at 08:35

## 2024-01-12 ASSESSMENT — PULMONARY FUNCTION TESTS
PIF_VALUE: 27
PIF_VALUE: 31
PIF_VALUE: 18
PIF_VALUE: 22
PIF_VALUE: 23
PIF_VALUE: 18
PIF_VALUE: 25
PIF_VALUE: 23
PIF_VALUE: 25
PIF_VALUE: 26
PIF_VALUE: 26
PIF_VALUE: 23
PIF_VALUE: 27
PIF_VALUE: 26
PIF_VALUE: 27
PIF_VALUE: 27

## 2024-01-12 ASSESSMENT — PAIN DESCRIPTION - LOCATION
LOCATION: ABDOMEN
LOCATION: ABDOMEN

## 2024-01-12 ASSESSMENT — PAIN SCALES - GENERAL
PAINLEVEL_OUTOF10: 0
PAINLEVEL_OUTOF10: 0
PAINLEVEL_OUTOF10: 7
PAINLEVEL_OUTOF10: 0
PAINLEVEL_OUTOF10: 7

## 2024-01-12 ASSESSMENT — PAIN DESCRIPTION - ORIENTATION
ORIENTATION: RIGHT;LEFT
ORIENTATION: ANTERIOR

## 2024-01-12 ASSESSMENT — PAIN DESCRIPTION - DESCRIPTORS: DESCRIPTORS: ACHING

## 2024-01-12 NOTE — PLAN OF CARE
Problem: Respiratory - Adult  Goal: Achieves optimal ventilation and oxygenation  1/12/2024 0837 by Zachariah Crook, AGNIESZKA  Outcome: Progressing

## 2024-01-12 NOTE — CARE COORDINATION
Transitional Planning:  Call from Hoa at [facility], can accept the patient this weekend.  Hoa will be available on her cell phone this weekend to arrange transfer if medically stable.

## 2024-01-12 NOTE — PROGRESS NOTES
SUBSTITUTE, WOUND VAC PLACEMENT, IRRIGATION AND DEBRIDEMENT OF RIGHT LOWER ABDOMINAL WOUND performed by Stephanie Joseph MD at [facility] OR    ABDOMEN SURGERY N/A 1/8/2024    WOUND VAC EXCHANGE, GRAFT PREPERATION 54X47 AND NWP GREATER THAN 50 SQ CM performed by Escobar Ulloa MD at [facility] OR    COLONOSCOPY      2012    COLONOSCOPY N/A 04/19/2023    COLONOSCOPY DIAGNOSTIC performed by Dorian Rendon MD at [facility] OR    COLONOSCOPY  04/20/2023    COLONOSCOPY N/A 4/20/2023    COLONOSCOPY DIAGNOSTIC performed by Dorian Rendon MD at [facility] OR    CYSTOSCOPY  01/24/2018    CYSTOSCOPY  02/20/2019    CYSTOSCOPY N/A 02/20/2019    CYSTOSCOPY DILATION performed by Fazal Guallpa MD at [facility] OR    CYSTOSCOPY N/A 08/23/2019    CYSTOSCOPY/ DILATION NICOLE CATHETER EXCHANGE performed by Fazal Guallpa MD at [facility] OR    CYSTOSCOPY N/A 06/08/2022    CYSTOSCOPY, REMOVAL OF SMALL BLADDER STONE AND BLADDER IRRIGATION performed by Fazal Guallpa MD at [facility] OR    ENDOSCOPY, COLON, DIAGNOSTIC      HERNIA REPAIR      INCISIONAL HERNIA REPAIR  05/20/2018    repair and reduction of recurrent incarcerated incisional hernia with mesh    LAPAROTOMY N/A 1/2/2024    LAPAROTOMY EXPLORATORY, DEBRIDEMENT OF SUBQ TISSUE INCLUDING FASCIA 50X44, APPLICATION OF WOUND VAC >50 SQ CM, PARTIAL GASTRECTOMY, EXTENSIVE LYSIS OF ADHESIONS performed by Escobar Ulloa MD at [facility] OR    LAPAROTOMY N/A 1/3/2024    REOPEN LAPAROTOMY, WOUND VAC PLACEMENT, OPEN GASTROSTOMY TUBE PLACEMENT, REPAIR OF LARGE VENTRAL DEFECT WITH DEVON MATRIX, DEBRIDEMENT OF SUBQUTANEOUS TISSUE AND MUSCLE, ALBIN DRAINS X2 performed by Mary Carmen Monte MD at [facility] OR    NE CYSTOURETHROSCOPY N/A 01/24/2018    CYSTOSCOPY /DILATION performed by Fazal Guallpa MD at [facility] OR    NE EGD TRANSORAL BIOPSY SINGLE/MULTIPLE N/A 07/19/2017    EGD BIOPSY performed by Mike Gu MD at [facility] OR    NE I&D BELOW FASCIA FOOT 1 BURSAL SPACE Bilateral 08/22/2017    FOOT DEBRIDEMENT INCISION AND DRAINAGE with  including those of syntax and sound a like substitutions which may escape proof reading.  It such instances, actual meaningcan be extrapolated by contextual diversion.            Lurdes Muller MD  Office: [phone]  Perfect serve / office [phone]

## 2024-01-12 NOTE — PLAN OF CARE
Problem: Respiratory - Adult  Goal: Achieves optimal ventilation and oxygenation  1/11/2024 2140 by Jenny Chamberlain RCP  Outcome: Progressing     Problem: OXYGENATION/RESPIRATORY FUNCTION  Goal: Patient will maintain patent airway  Outcome: Ongoing  Goal: Patient will achieve/maintain normal respiratory rate/effort  Respiratory rate and effort will be within normal limits for the patient  Outcome: Ongoing    Problem: MECHANICAL VENTILATION  Goal: Patient will maintain patent airway  Outcome: Ongoing  Goal: Oral health is maintained or improved  Outcome: Ongoing  Goal: ET tube will be managed safely  Outcome: Ongoing  Goal: Ability to express needs and understand communication  Outcome: Ongoing  Goal: Mobility/activity is maintained at optimum level for patient  Outcome: Ongoing    Problem: ASPIRATION PRECAUTIONS  Goal: Patient’s risk of aspiration is minimized  Outcome: Ongoing    Problem: SKIN INTEGRITY  Goal: Skin integrity is maintained or improved  Outcome: Ongoing

## 2024-01-12 NOTE — PROGRESS NOTES
ICU PROGRESS NOTE        PATIENT NAME: Ruben Dimas  MEDICAL RECORD NO. [id number]  DATE: 1/12/2024    PRIMARY CARE PHYSICIAN: Ramy Lazo MD    HD: # 11    ASSESSMENT    Patient Active Problem List   Diagnosis    Alcoholic cirrhosis of liver (HCC), sober since 2013    Peripheral edema    Bipolar disorder (HCC)    Type 2 diabetes mellitus with diabetic neuropathy, with long-term current use of insulin (HCC)    Essential hypertension, currently hypotensive    Dyslipidemia    Weakness    Acute metabolic encephalopathy    FABI (obstructive sleep apnea)    Primary osteoarthritis of right knee    Type 2 diabetes mellitus with diabetic neuropathy (HCC)    Acquired hypothyroidism    Urine retention    Anemia    Slow transit constipation    Iron deficiency anemia due to chronic blood loss    Gastroesophageal reflux disease without esophagitis    LEONARDA (acute kidney injury) (HCC)    Secondary esophageal varices without bleeding (HCC)    Portal hypertension (HCC)    Obesity, morbid, BMI 50 or higher (HCC)    Venous stasis dermatitis of both lower extremities    Cellulitis of perineum    Chronic indwelling Clemons catheter    Anxiety and depression    Back pain, chronic    BPH (benign prostatic hyperplasia)    Chronic diastolic CHF (congestive heart failure) (HCC)    Cirrhosis (HCC)    Diabetes mellitus (HCC)    GERD (gastroesophageal reflux disease)    Hepatitis    Hiatal hernia    Hypertension, essential    IBS (irritable bowel syndrome)    Morbid obesity with BMI of 45.0-49.9, adult (HCC)    Neuropathy    Chronic respiratory failure with hypoxia, on home oxygen therapy (HCC)    Sleep apnea    Thyroid disease    Urinary bladder neurogenic dysfunction    Acute UTI    Musculoskeletal immobility    Pyocystis    Myoclonic jerking    Thrombocytopenia (HCC)    Hypotension    Sepsis with acute hypercapnic respiratory failure and septic shock (HCC)    Right lower lobe pneumonia    Acute kidney injury superimposed on CKD (HCC)     line (1/4); RUQ ALBIN x1 (1/3); Wound vac (1/10); G-tube (1/3); Trach (1/2); PIV (1/1)    PPX:  -DVT: SQ Heparin  -GI: Prevacid    CONSULTS   -ID   -Cardiology    DISPO:    -Remain in ICU   -Longterm - LTAC: accepted at [facility]      CHECKLIST    CAM-ICU RASS: -1 to 0  RESTRAINTS: None  IVF: None  NUTRITION: Tube feeds @50cc/hr  ANTIBIOTICS: Merrem (1/9-1/14), Micafungin (1/9-unk) doxy  GI: prevacid  DVT: SQH  GLYCEMIC CONTROL: HDSS w/ lantus  HOB >45: Yes  MOBILITY: PT/OT  SBT:  IS:  Wound care: Wound vac - bedside      Chief Complaint: \"CP and SOB\"    SUBJECTIVE    Ruben Dimas is a 58-year-old male who initially presented with chest pain and shortness of breath.  Patient has a past medical history of chronic respiratory failure with tracheostomy.  A-fib, not currently on anticoagulation due to history of GI bleed.  Congestive heart failure, diabetes, hypertension, hypothyroidism, alcoholic liver cirrhosis.  Patient presented from his care facility after he was noted to be desatting on his home ventilator.  Initial EKG was showing ST elevation and A-fib RVR.  Patient brought to the ER and was imperforate cyst and A-fib with RVR.  Patient had required pressor support and had a sepsis workup done.     Patient was later found to have large area of cellulitic changes in his abdominal wall surrounding his PEG tube site.  Patient later discovered to have necrotizing fasciitis of the abdominal wall extending up into the chest.  Patient was taken emergently to the operating room for debridement and washout.  Patient was admitted to medical ICU postoperatively and then transferred down to trauma ICU.    Pt had some nausea overnight, so tube feed was paused. Nausea not improved with zofran and compazine ordered. Pt had hypoglycemic episode overnight that improved with a bolus of D10. Pt reporting some itching in the lower extremities. No other complaints at this time. Continues to follow commands this morning.

## 2024-01-12 NOTE — PLAN OF CARE
Problem: Discharge Planning  Goal: Discharge to home or other facility with appropriate resources  Outcome: Progressing     Problem: Pain  Goal: Verbalizes/displays adequate comfort level or baseline comfort level  Outcome: Progressing     Problem: Safety - Adult  Goal: Free from fall injury  Outcome: Progressing     Problem: Respiratory - Adult  Goal: Achieves optimal ventilation and oxygenation  1/12/2024 0011 by Agueda Aldridge RN  Outcome: Progressing     Problem: Chronic Conditions and Co-morbidities  Goal: Patient's chronic conditions and co-morbidity symptoms are monitored and maintained or improved  Outcome: Progressing

## 2024-01-12 NOTE — PROGRESS NOTES
Comprehensive Nutrition Assessment    Type and Reason for Visit:  Reassess    Nutrition Recommendations/Plan:   Continue trickle feedings of Immune Enhancing formula at 10 mL/hr.  As able, suggest slowly advancing to goal rate 60 mL/hr.  Monitor labs, weights, and plan of care.  Suggest starting pt on an increased bowel regimen due to no recorded BM since admission.     Malnutrition Assessment:  Malnutrition Status:  Insufficient data (01/03/24 1416)    Context:  Acute Illness       Nutrition Assessment:    Pt remains intubated via trach.  TF held due to nausea and episode of vomiting overnight.  RN reports trickle TF of Immune Enhancing formula have been restarted at 10 mL/hr.  Noted no recorded BM since admission - pt receiving Colace and Senokot.  Weights reviewed.  Labs reviewed: Glucose 35-81 mg/dL, BUN 44 mg/dL, Cl 117 mmol/L.  Meds include: Colace, Synthroid, Senokot, Zofran PRN; D10% at 100 mL/hr.    Nutrition Related Findings:    labs/meds reviewed.  +1 facial edema.  no BM since admission.  c/o nausea/vomiting. Wound Type: Surgical Incision, Wound Vac (to abdomen)       Current Nutrition Intake & Therapies:    Average Meal Intake: NPO  Average Supplements Intake: NPO  Diet NPO Exceptions are: Sips of Water with Meds  ADULT TUBE FEEDING; Nasogastric; Immune Enhancing; Continuous; 25; Yes; 10; Q 4 hours; 50; 30; Q 4 hours  Current Tube Feeding (TF) Orders:  Feeding Route: Nasogastric  Formula: Immune Enhancing  Schedule: Continuous  Feeding Regimen: Trickle feedings at 10 mL/hr  Additives/Modulars: None  Water Flushes: 30 mL q 4 hrs  Current TF & Flush Orders Provides: At 10 mL/hr = 360 kcals, 23 gm protein  Goal TF & Flush Orders Provides: Immune Enhancing goal 60 mL/hr = 2160 kcals, 135 gm protein    Additional Calorie Sources:  D10% at 100 mL/hr = 816 kcals    Anthropometric Measures:  Height: 177.8 cm (5' 10\")  Ideal Body Weight (IBW): 166 lbs (75 kg)    Admission Body Weight: 156.9 kg (345 lb 14.4  oz)  Current Body Weight: 161 kg (354 lb 15.1 oz), 213.8 % IBW. Weight Source: Bed Scale  Current BMI (kg/m2): 50.9  Usual Body Weight: 192 kg (423 lb 5 oz) (10/19/23 bed scale per chart review)  % Weight Change (Calculated): -17.6  Weight Adjustment For: No Adjustment                 BMI Categories: Obese Class 3 (BMI 40.0 or greater)    Estimated Daily Nutrient Needs:  Energy Requirements Based On: Kcal/kg  Weight Used for Energy Requirements: Admission  Energy (kcal/day): 1900 to 2200 kcal/day (PSU 2010)  Weight Used for Protein Requirements: Ideal  Protein (g/day): 110-150 gm pro/day  Fluid (ml/day): per MD    Nutrition Diagnosis:   Inadequate oral intake related to impaired respiratory function (recent abdominal surgeries; TF tolerance issues) as evidenced by NPO or clear liquid status due to medical condition, nutrition support - enteral nutrition, nausea, vomiting  Increased nutrient needs related to  (healing) as evidenced by wounds    Nutrition Interventions:   Food and/or Nutrient Delivery: Continue NPO, Continue Current Tube Feeding (Continue trickle feedings with slow advancement to goal rate 60 mL/hr as able.)  Nutrition Education/Counseling: No recommendation at this time  Coordination of Nutrition Care: Continue to monitor while inpatient  Plan of Care discussed with: RN    Goals:  Previous Goal Met: Progressing toward Goal(s)  Goals: Meet at least 75% of estimated needs, prior to discharge       Nutrition Monitoring and Evaluation:   Behavioral-Environmental Outcomes: None Identified  Food/Nutrient Intake Outcomes: Enteral Nutrition Intake/Tolerance  Physical Signs/Symptoms Outcomes: Biochemical Data, GI Status, Constipation, Fluid Status or Edema, Hemodynamic Status, Weight, Skin, Nutrition Focused Physical Findings    Discharge Planning:    Too soon to determine     Sally Mc RD, LD  Contact: [phone]   Melolabial Transposition Flap Text: The defect edges were debeveled with a #15 scalpel blade. Given the location of the defect and the proximity to free margins a melolabial flap was deemed most appropriate. Using a sterile surgical marker, an appropriate melolabial transposition flap was drawn incorporating the defect. The area thus outlined was incised deep to adipose tissue with a #15 scalpel blade. The skin margins were undermined to an appropriate distance in all directions utilizing iris scissors. Following this, the designed flap was carried over into the primary defect and sutured into place.

## 2024-01-13 ENCOUNTER — ANESTHESIA EVENT (OUTPATIENT)
Dept: OPERATING ROOM | Age: 59
End: 2024-01-13
Payer: MEDICARE

## 2024-01-13 ENCOUNTER — APPOINTMENT (OUTPATIENT)
Age: 59
DRG: 853 | End: 2024-01-13
Payer: MEDICARE

## 2024-01-13 ENCOUNTER — APPOINTMENT (OUTPATIENT)
Dept: GENERAL RADIOLOGY | Age: 59
DRG: 853 | End: 2024-01-13
Payer: MEDICARE

## 2024-01-13 ENCOUNTER — ANESTHESIA (OUTPATIENT)
Dept: OPERATING ROOM | Age: 59
End: 2024-01-13
Payer: MEDICARE

## 2024-01-13 LAB
ALLEN TEST: ABNORMAL
ANION GAP SERPL CALCULATED.3IONS-SCNC: 12 MMOL/L (ref 9–17)
BASOPHILS # BLD: 0 K/UL (ref 0–0.2)
BASOPHILS NFR BLD: 0 % (ref 0–2)
BUN SERPL-MCNC: 40 MG/DL (ref 6–20)
CA-I BLD-SCNC: 1.31 MMOL/L (ref 1.13–1.33)
CALCIUM SERPL-MCNC: 9.3 MG/DL (ref 8.6–10.4)
CHLORIDE SERPL-SCNC: 111 MMOL/L (ref 98–107)
CO2 SERPL-SCNC: 16 MMOL/L (ref 20–31)
CREAT SERPL-MCNC: 1.1 MG/DL (ref 0.7–1.2)
CRP SERPL HS-MCNC: 221 MG/L (ref 0–5)
EOSINOPHIL # BLD: 0.28 K/UL (ref 0–0.44)
EOSINOPHILS RELATIVE PERCENT: 1 % (ref 1–4)
ERYTHROCYTE [DISTWIDTH] IN BLOOD BY AUTOMATED COUNT: 19.3 % (ref 11.8–14.4)
FIO2: 40
GFR SERPL CREATININE-BSD FRML MDRD: >60 ML/MIN/1.73M2
GLUCOSE BLD-MCNC: 100 MG/DL (ref 75–110)
GLUCOSE BLD-MCNC: 114 MG/DL (ref 75–110)
GLUCOSE BLD-MCNC: 116 MG/DL (ref 74–100)
GLUCOSE BLD-MCNC: 118 MG/DL (ref 75–110)
GLUCOSE BLD-MCNC: 123 MG/DL (ref 75–110)
GLUCOSE BLD-MCNC: 173 MG/DL (ref 75–110)
GLUCOSE BLD-MCNC: 174 MG/DL (ref 75–110)
GLUCOSE SERPL-MCNC: 122 MG/DL (ref 70–99)
HCT VFR BLD AUTO: 26.5 % (ref 40.7–50.3)
HGB BLD-MCNC: 8.2 G/DL (ref 13–17)
IMM GRANULOCYTES # BLD AUTO: 0.55 K/UL (ref 0–0.3)
IMM GRANULOCYTES NFR BLD: 2 %
LYMPHOCYTES NFR BLD: 1.38 K/UL (ref 1.1–3.7)
LYMPHOCYTES RELATIVE PERCENT: 5 % (ref 24–43)
MAGNESIUM SERPL-MCNC: 2.8 MG/DL (ref 1.6–2.6)
MCH RBC QN AUTO: 29.3 PG (ref 25.2–33.5)
MCHC RBC AUTO-ENTMCNC: 30.9 G/DL (ref 28.4–34.8)
MCV RBC AUTO: 94.6 FL (ref 82.6–102.9)
MODE: ABNORMAL
MONOCYTES NFR BLD: 1.38 K/UL (ref 0.1–1.2)
MONOCYTES NFR BLD: 5 % (ref 3–12)
MORPHOLOGY: ABNORMAL
NEGATIVE BASE EXCESS, ART: 8.4 MMOL/L (ref 0–2)
NEUTROPHILS NFR BLD: 87 % (ref 36–65)
NEUTS SEG NFR BLD: 23.91 K/UL (ref 1.5–8.1)
NRBC BLD-RTO: 0 PER 100 WBC
O2 DELIVERY DEVICE: ABNORMAL
PHOSPHATE SERPL-MCNC: 5.6 MG/DL (ref 2.5–4.5)
PLATELET # BLD AUTO: 177 K/UL (ref 138–453)
PMV BLD AUTO: 9.9 FL (ref 8.1–13.5)
POC HCO3: 17 MMOL/L (ref 21–28)
POC LACTIC ACID: 0.8 MMOL/L (ref 0.56–1.39)
POC O2 SATURATION: 99.1 % (ref 94–98)
POC PCO2: 33.2 MM HG (ref 35–48)
POC PH: 7.32 (ref 7.35–7.45)
POC PO2: 145 MM HG (ref 83–108)
POTASSIUM SERPL-SCNC: 4.5 MMOL/L (ref 3.7–5.3)
RBC # BLD AUTO: 2.8 M/UL (ref 4.21–5.77)
SAMPLE SITE: ABNORMAL
SODIUM SERPL-SCNC: 139 MMOL/L (ref 135–144)
WBC OTHER # BLD: 27.5 K/UL (ref 3.5–11.3)

## 2024-01-13 PROCEDURE — 99233 SBSQ HOSP IP/OBS HIGH 50: CPT | Performed by: INTERNAL MEDICINE

## 2024-01-13 PROCEDURE — 0JB80ZZ EXCISION OF ABDOMEN SUBCUTANEOUS TISSUE AND FASCIA, OPEN APPROACH: ICD-10-PCS | Performed by: SURGERY

## 2024-01-13 PROCEDURE — 6370000000 HC RX 637 (ALT 250 FOR IP)

## 2024-01-13 PROCEDURE — 2580000003 HC RX 258: Performed by: EMERGENCY MEDICINE

## 2024-01-13 PROCEDURE — 80048 BASIC METABOLIC PNL TOTAL CA: CPT

## 2024-01-13 PROCEDURE — 6360000002 HC RX W HCPCS

## 2024-01-13 PROCEDURE — 82803 BLOOD GASES ANY COMBINATION: CPT

## 2024-01-13 PROCEDURE — 94761 N-INVAS EAR/PLS OXIMETRY MLT: CPT

## 2024-01-13 PROCEDURE — 3700000001 HC ADD 15 MINUTES (ANESTHESIA): Performed by: SURGERY

## 2024-01-13 PROCEDURE — 6360000004 HC RX CONTRAST MEDICATION

## 2024-01-13 PROCEDURE — 86850 RBC ANTIBODY SCREEN: CPT

## 2024-01-13 PROCEDURE — 6360000002 HC RX W HCPCS: Performed by: INTERNAL MEDICINE

## 2024-01-13 PROCEDURE — 86901 BLOOD TYPING SEROLOGIC RH(D): CPT

## 2024-01-13 PROCEDURE — 86920 COMPATIBILITY TEST SPIN: CPT

## 2024-01-13 PROCEDURE — 2580000003 HC RX 258

## 2024-01-13 PROCEDURE — 2709999900 HC NON-CHARGEABLE SUPPLY: Performed by: SURGERY

## 2024-01-13 PROCEDURE — 82947 ASSAY GLUCOSE BLOOD QUANT: CPT

## 2024-01-13 PROCEDURE — 11005 DBRDMT SKIN ABDOMINAL WALL: CPT | Performed by: SURGERY

## 2024-01-13 PROCEDURE — 97606 NEG PRS WND THER DME>50 SQCM: CPT | Performed by: SURGERY

## 2024-01-13 PROCEDURE — 85025 COMPLETE CBC W/AUTO DIFF WBC: CPT

## 2024-01-13 PROCEDURE — 6370000000 HC RX 637 (ALT 250 FOR IP): Performed by: STUDENT IN AN ORGANIZED HEALTH CARE EDUCATION/TRAINING PROGRAM

## 2024-01-13 PROCEDURE — 84100 ASSAY OF PHOSPHORUS: CPT

## 2024-01-13 PROCEDURE — 2580000003 HC RX 258: Performed by: SURGERY

## 2024-01-13 PROCEDURE — 3600000013 HC SURGERY LEVEL 3 ADDTL 15MIN: Performed by: SURGERY

## 2024-01-13 PROCEDURE — 83735 ASSAY OF MAGNESIUM: CPT

## 2024-01-13 PROCEDURE — 71045 X-RAY EXAM CHEST 1 VIEW: CPT

## 2024-01-13 PROCEDURE — 82330 ASSAY OF CALCIUM: CPT

## 2024-01-13 PROCEDURE — 2000000000 HC ICU R&B

## 2024-01-13 PROCEDURE — 2500000003 HC RX 250 WO HCPCS

## 2024-01-13 PROCEDURE — 2580000003 HC RX 258: Performed by: INTERNAL MEDICINE

## 2024-01-13 PROCEDURE — 86140 C-REACTIVE PROTEIN: CPT

## 2024-01-13 PROCEDURE — 94003 VENT MGMT INPAT SUBQ DAY: CPT

## 2024-01-13 PROCEDURE — 3700000000 HC ANESTHESIA ATTENDED CARE: Performed by: SURGERY

## 2024-01-13 PROCEDURE — 6360000002 HC RX W HCPCS: Performed by: EMERGENCY MEDICINE

## 2024-01-13 PROCEDURE — 99232 SBSQ HOSP IP/OBS MODERATE 35: CPT | Performed by: SURGERY

## 2024-01-13 PROCEDURE — 37799 UNLISTED PX VASCULAR SURGERY: CPT

## 2024-01-13 PROCEDURE — 3600000003 HC SURGERY LEVEL 3 BASE: Performed by: SURGERY

## 2024-01-13 PROCEDURE — 83605 ASSAY OF LACTIC ACID: CPT

## 2024-01-13 PROCEDURE — 71260 CT THORAX DX C+: CPT

## 2024-01-13 PROCEDURE — P9041 ALBUMIN (HUMAN),5%, 50ML: HCPCS

## 2024-01-13 PROCEDURE — 2700000000 HC OXYGEN THERAPY PER DAY

## 2024-01-13 PROCEDURE — 86900 BLOOD TYPING SEROLOGIC ABO: CPT

## 2024-01-13 PROCEDURE — 87641 MR-STAPH DNA AMP PROBE: CPT

## 2024-01-13 RX ORDER — PROPOFOL 10 MG/ML
INJECTION, EMULSION INTRAVENOUS CONTINUOUS PRN
Status: DISCONTINUED | OUTPATIENT
Start: 2024-01-13 | End: 2024-01-13 | Stop reason: SDUPTHER

## 2024-01-13 RX ORDER — MIDAZOLAM HYDROCHLORIDE 1 MG/ML
INJECTION INTRAMUSCULAR; INTRAVENOUS PRN
Status: DISCONTINUED | OUTPATIENT
Start: 2024-01-13 | End: 2024-01-13 | Stop reason: SDUPTHER

## 2024-01-13 RX ORDER — MAGNESIUM HYDROXIDE 1200 MG/15ML
LIQUID ORAL CONTINUOUS PRN
Status: COMPLETED | OUTPATIENT
Start: 2024-01-13 | End: 2024-01-13

## 2024-01-13 RX ORDER — ROCURONIUM BROMIDE 10 MG/ML
INJECTION, SOLUTION INTRAVENOUS PRN
Status: DISCONTINUED | OUTPATIENT
Start: 2024-01-13 | End: 2024-01-13 | Stop reason: SDUPTHER

## 2024-01-13 RX ORDER — PROPOFOL 10 MG/ML
INJECTION, EMULSION INTRAVENOUS
Status: COMPLETED
Start: 2024-01-13 | End: 2024-01-13

## 2024-01-13 RX ORDER — ALBUMIN, HUMAN INJ 5% 5 %
SOLUTION INTRAVENOUS PRN
Status: DISCONTINUED | OUTPATIENT
Start: 2024-01-13 | End: 2024-01-13 | Stop reason: SDUPTHER

## 2024-01-13 RX ORDER — FENTANYL CITRATE 50 UG/ML
INJECTION, SOLUTION INTRAMUSCULAR; INTRAVENOUS PRN
Status: DISCONTINUED | OUTPATIENT
Start: 2024-01-13 | End: 2024-01-13 | Stop reason: SDUPTHER

## 2024-01-13 RX ORDER — SODIUM CHLORIDE, SODIUM LACTATE, POTASSIUM CHLORIDE, CALCIUM CHLORIDE 600; 310; 30; 20 MG/100ML; MG/100ML; MG/100ML; MG/100ML
INJECTION, SOLUTION INTRAVENOUS CONTINUOUS PRN
Status: DISCONTINUED | OUTPATIENT
Start: 2024-01-13 | End: 2024-01-13 | Stop reason: SDUPTHER

## 2024-01-13 RX ADMIN — IOPAMIDOL 75 ML: 755 INJECTION, SOLUTION INTRAVENOUS at 15:44

## 2024-01-13 RX ADMIN — ACETAMINOPHEN 1000 MG: 650 SOLUTION ORAL at 14:05

## 2024-01-13 RX ADMIN — MICAFUNGIN SODIUM 100 MG: 100 INJECTION, POWDER, LYOPHILIZED, FOR SOLUTION INTRAVENOUS at 13:49

## 2024-01-13 RX ADMIN — METOCLOPRAMIDE 5 MG: 5 INJECTION, SOLUTION INTRAMUSCULAR; INTRAVENOUS at 05:44

## 2024-01-13 RX ADMIN — ROCURONIUM BROMIDE 50 MG: 10 INJECTION, SOLUTION INTRAVENOUS at 08:40

## 2024-01-13 RX ADMIN — PROPOFOL 25 MG: 10 INJECTION, EMULSION INTRAVENOUS at 12:46

## 2024-01-13 RX ADMIN — SENNOSIDES 8.8 MG: 8.8 LIQUID ORAL at 21:00

## 2024-01-13 RX ADMIN — HEPARIN SODIUM 7500 UNITS: 5000 INJECTION INTRAVENOUS; SUBCUTANEOUS at 14:06

## 2024-01-13 RX ADMIN — SODIUM CHLORIDE, POTASSIUM CHLORIDE, SODIUM LACTATE AND CALCIUM CHLORIDE: 600; 310; 30; 20 INJECTION, SOLUTION INTRAVENOUS at 08:33

## 2024-01-13 RX ADMIN — FENTANYL CITRATE 50 MCG: 50 INJECTION, SOLUTION INTRAMUSCULAR; INTRAVENOUS at 08:37

## 2024-01-13 RX ADMIN — PROPOFOL 50 MCG/KG/MIN: 10 INJECTION, EMULSION INTRAVENOUS at 09:26

## 2024-01-13 RX ADMIN — HEPARIN SODIUM 7500 UNITS: 5000 INJECTION INTRAVENOUS; SUBCUTANEOUS at 21:39

## 2024-01-13 RX ADMIN — MIDAZOLAM 2 MG: 1 INJECTION INTRAMUSCULAR; INTRAVENOUS at 08:33

## 2024-01-13 RX ADMIN — LANSOPRAZOLE 30 MG: 30 TABLET, ORALLY DISINTEGRATING, DELAYED RELEASE ORAL at 17:11

## 2024-01-13 RX ADMIN — MEROPENEM 1000 MG: 1 INJECTION, POWDER, FOR SOLUTION INTRAVENOUS at 05:10

## 2024-01-13 RX ADMIN — DEXTROSE MONOHYDRATE: 50 INJECTION, SOLUTION INTRAVENOUS at 02:24

## 2024-01-13 RX ADMIN — VANCOMYCIN HYDROCHLORIDE 2000 MG: 1 INJECTION, POWDER, LYOPHILIZED, FOR SOLUTION INTRAVENOUS at 14:40

## 2024-01-13 RX ADMIN — DEXTROSE MONOHYDRATE: 50 INJECTION, SOLUTION INTRAVENOUS at 23:43

## 2024-01-13 RX ADMIN — OXYCODONE HYDROCHLORIDE 10 MG: 5 TABLET ORAL at 01:35

## 2024-01-13 RX ADMIN — OXYCODONE HYDROCHLORIDE 10 MG: 5 TABLET ORAL at 21:39

## 2024-01-13 RX ADMIN — METOCLOPRAMIDE 5 MG: 5 INJECTION, SOLUTION INTRAMUSCULAR; INTRAVENOUS at 01:34

## 2024-01-13 RX ADMIN — METOCLOPRAMIDE 5 MG: 5 INJECTION, SOLUTION INTRAMUSCULAR; INTRAVENOUS at 17:11

## 2024-01-13 RX ADMIN — MEROPENEM 1000 MG: 1 INJECTION, POWDER, FOR SOLUTION INTRAVENOUS at 13:13

## 2024-01-13 RX ADMIN — ALBUMIN (HUMAN) 25 G: 12.5 INJECTION, SOLUTION INTRAVENOUS at 09:25

## 2024-01-13 RX ADMIN — FENTANYL CITRATE 50 MCG: 50 INJECTION, SOLUTION INTRAMUSCULAR; INTRAVENOUS at 09:11

## 2024-01-13 RX ADMIN — ACETAMINOPHEN 1000 MG: 650 SOLUTION ORAL at 21:39

## 2024-01-13 RX ADMIN — FENTANYL CITRATE 50 MCG: 50 INJECTION, SOLUTION INTRAMUSCULAR; INTRAVENOUS at 08:44

## 2024-01-13 RX ADMIN — QUETIAPINE FUMARATE 50 MG: 25 TABLET ORAL at 21:40

## 2024-01-13 RX ADMIN — AMIODARONE HYDROCHLORIDE 200 MG: 200 TABLET ORAL at 21:40

## 2024-01-13 RX ADMIN — OXYCODONE HYDROCHLORIDE 10 MG: 5 TABLET ORAL at 17:57

## 2024-01-13 RX ADMIN — MEROPENEM 1000 MG: 1 INJECTION, POWDER, FOR SOLUTION INTRAVENOUS at 22:26

## 2024-01-13 RX ADMIN — OXYCODONE HYDROCHLORIDE 10 MG: 5 TABLET ORAL at 13:34

## 2024-01-13 RX ADMIN — DOXYCYCLINE HYCLATE 100 MG: 100 TABLET, COATED ORAL at 21:40

## 2024-01-13 RX ADMIN — DOCUSATE SODIUM LIQUID 100 MG: 100 LIQUID ORAL at 21:39

## 2024-01-13 RX ADMIN — ROCURONIUM BROMIDE 50 MG: 10 INJECTION, SOLUTION INTRAVENOUS at 09:19

## 2024-01-13 RX ADMIN — SODIUM CHLORIDE, POTASSIUM CHLORIDE, SODIUM LACTATE AND CALCIUM CHLORIDE: 600; 310; 30; 20 INJECTION, SOLUTION INTRAVENOUS at 09:11

## 2024-01-13 RX ADMIN — FENTANYL CITRATE 50 MCG: 50 INJECTION, SOLUTION INTRAMUSCULAR; INTRAVENOUS at 08:53

## 2024-01-13 RX ADMIN — FENTANYL CITRATE 100 MCG: 50 INJECTION, SOLUTION INTRAMUSCULAR; INTRAVENOUS at 06:00

## 2024-01-13 ASSESSMENT — PULMONARY FUNCTION TESTS
PIF_VALUE: 27
PIF_VALUE: 23
PIF_VALUE: 28
PIF_VALUE: 26
PIF_VALUE: 29
PIF_VALUE: 26
PIF_VALUE: 27
PIF_VALUE: 25
PIF_VALUE: 25
PIF_VALUE: 26
PIF_VALUE: 27
PIF_VALUE: 25
PIF_VALUE: 22
PIF_VALUE: 27

## 2024-01-13 ASSESSMENT — PAIN SCALES - WONG BAKER
WONGBAKER_NUMERICALRESPONSE: 0
WONGBAKER_NUMERICALRESPONSE: 8

## 2024-01-13 ASSESSMENT — PAIN SCALES - GENERAL
PAINLEVEL_OUTOF10: 8
PAINLEVEL_OUTOF10: 0

## 2024-01-13 NOTE — PLAN OF CARE
Problem: Respiratory - Adult  Goal: Achieves optimal ventilation and oxygenation  1/12/2024 2122 by Reji Weiner RCP  Outcome: Progressing   BRONCHOSPASM/BRONCHOCONSTRICTION     [x]         IMPROVE AERATION/BREATH SOUNDS  [x]   ADMINISTER BRONCHODILATOR THERAPY AS APPROPRIATE  [x]   ASSESS BREATH SOUNDS  [x]   IMPLEMENT AEROSOL/MDI PROTOCOL  [x]   PATIENT EDUCATION AS NEEDED  Problem: OXYGENATION/RESPIRATORY FUNCTION  Goal: Patient will maintain patent airway  Outcome: Ongoing  Goal: Patient will achieve/maintain normal respiratory rate/effort  Respiratory rate and effort will be within normal limits for the patient  Outcome: Ongoing    Problem: MECHANICAL VENTILATION  Goal: Patient will maintain patent airway  Outcome: Ongoing  Goal: Oral health is maintained or improved  Outcome: Ongoing  Goal:  Trach tube will be managed safely  Outcome: Ongoing  Goal: Ability to express needs and understand communication  Outcome: Ongoing  Goal: Mobility/activity is maintained at optimum level for patient  Outcome: Ongoing    Problem: ASPIRATION PRECAUTIONS  Goal: Patient’s risk of aspiration is minimized  Outcome: Ongoing    Problem: SKIN INTEGRITY  Goal: Skin integrity is maintained or improved  Outcome: Ongoing

## 2024-01-13 NOTE — PROGRESS NOTES
01/13/24 1613   Patient Transport   Time Spent Transporting 31-45   Transport Ventillation Type Transport vent   Transport From ICU   Transport Destination CT scan   Emergency Equipment Included Yes           The transport originated from [facility], room 1014. Pt. was transported to CT-scan. Assisting with the transport was RN.  Appropriate devices were applied to monitor the patient's condition during transport. Patient transported  via 40% O2 via ventilator. Patient tolerated well.        MATTHEW MEYER RCP  4:22 PM

## 2024-01-13 NOTE — ANESTHESIA POSTPROCEDURE EVALUATION
Department of Anesthesiology  Postprocedure Note    Patient: Ruben Dimas  MRN: [id number]  YOB: 1965  Date of evaluation: 1/13/2024    Procedure Summary       Date: 01/13/24 Room / Location: [address]    Anesthesia Start: 0833 Anesthesia Stop: 1020    Procedure: TAKE BACK ABDOMINAL  WOUND DEBRIDEMENT, WOUND BED PREPARATION, WOUND VAC CHANGE  (MISONIX) Diagnosis:       Necrotizing fasciitis (HCC)      (Necrotizing fasciitis (HCC) [M72.6])    Surgeons: Mary Carmen Monte MD Responsible Provider: Moreno Jara MD    Anesthesia Type: general ASA Status: 4            Anesthesia Type: No value filed.    Des Phase I:      Des Phase II:    POST-OP ANESTHESIA NOTE       BP (!) 107/40   Pulse 75   Temp 98.6 °F (37 °C) (Oral)   Resp 16   Ht 1.778 m (5' 10\")   Wt (!) 156.2 kg (344 lb 6.4 oz)   SpO2 100%   BMI 49.42 kg/m²    Pain Assessment: Doll-Baker FACES  Pain Level: 8       Anesthesia Post Evaluation    Patient location during evaluation: ICU  Patient participation: complete - patient cannot participate  Level of consciousness: sedated and ventilated  Pain score: 8  Airway patency: patent  Nausea & Vomiting: no vomiting and no nausea  Cardiovascular status: hemodynamically stable  Respiratory status: intubated and ventilator (Trach tube in place)  Hydration status: stable  Pain management: adequate        No notable events documented.

## 2024-01-13 NOTE — OP NOTE
2000   Collection Container Standard 01/02/24 2000   Securement Method Securing device (Describe) 01/02/24 2000   Catheter Care  Soap and water 01/02/24 0800   Catheter Best Practices  Drainage tube clipped to bed;Catheter secured to thigh;Tamper seal intact;Bag below bladder;Bag not on floor;Lack of dependent loop in tubing;Drainage bag less than half full 01/02/24 2000   Status Draining 01/02/24 2000   Output (mL) 75 mL 01/02/24 1856       [REMOVED] Urinary Catheter 01/02/24 2 Way (Removed)   Catheter Indications Urinary retention (acute or chronic), continuous bladder irrigation or bladder outlet obstruction 01/10/24 0800   Site Assessment No urethral drainage 01/10/24 0800   Urine Color Yellow 01/10/24 1200   Urine Appearance Clear 01/10/24 1200   Collection Container Standard 01/10/24 1200   Securement Method Securing device (Describe) 01/10/24 1200   Catheter Care  Soap and water 01/10/24 1200   Catheter Best Practices  Drainage tube clipped to bed;Tamper seal intact;Bag below bladder;Bag not on floor;Lack of dependent loop in tubing;Drainage bag less than half full;Catheter secured to thigh 01/10/24 1200   Status Draining 01/10/24 0400   Output (mL) 50 mL 01/10/24 1600       [REMOVED] External Urinary Catheter (Removed)   Site Assessment Clean,dry & intact 01/11/24 0800   Placement Replaced 01/11/24 0800   Securement Method Securing device (Describe) 01/11/24 0800   Catheter Care Catheter/Wick replaced;Suction Canister/Tubing changed 01/11/24 0800   Perineal Care Yes 01/11/24 0800   Suction 40 mmgHg continuous 01/11/24 0800   Urine Color Yellow 01/11/24 0800   Urine Appearance Clear 01/11/24 0800     Indications: This is a 57 yo male with history of dislodged G tube who presented with necrotizing fasciitis of the abdominal wall. Pt has undergone several operative interventions for source control and wound bed preparation. This is the second take back for abdominal wound re-exploration, debridement, and wound  DO Fredo on 1/13/2024 at 10:30 AM        I was present for the entire operation.       Mary Carmen Monte MD

## 2024-01-13 NOTE — PROGRESS NOTES
[facility]   Pharmacy Pharmacokinetic Monitoring Service - Vancomycin     Ruben Dimas is a 58 y.o. male starting on vancomycin therapy for sepsis. Pharmacy consulted by Dr. Muller for monitoring and adjustment.    Target Concentration: Goal AUC/SHAHNAZ 400-600 mg*hr/L    Additional Antimicrobials: micafungin, meropenem    Pertinent Laboratory Values:   Wt Readings from Last 1 Encounters:   01/13/24 (!) 156.2 kg (344 lb 6.4 oz)     Temp Readings from Last 1 Encounters:   01/13/24 98.6 °F (37 °C) (Oral)     Estimated Creatinine Clearance: 109 mL/min (based on SCr of 1.1 mg/dL).  Recent Labs     01/12/24  0547 01/13/24  0315   CREATININE 1.0 1.1   BUN 44* 40*   WBC 18.4* 27.5*     Pertinent Cultures:  Culture Date Source Results   1/5 Abdomen Klebsiella x 2  Enterococcus faecalis   MRSA Nasal Swab: showed MRSA positive result on 1/2    Plan:  Dosing recommendations based on Bayesian software and PK calculations  Start vancomycin 2000 mg x 1, followed by 1250 mg Q12  Anticipated AUC of ~480 and trough concentration of ~13 at steady state  Renal labs as indicated   Vancomycin concentration not ordered yet  Pharmacy will continue to monitor patient and adjust therapy as indicated    Thank you for the consult,  Leslie Chase RPH  1/13/2024 11:03 AM

## 2024-01-13 NOTE — PROGRESS NOTES
ICU PROGRESS NOTE        PATIENT NAME: Ruben Dimas  MEDICAL RECORD NO. [id number]  DATE: 1/13/2024    HD: # 12    ASSESSMENT    Patient Active Problem List   Diagnosis    Alcoholic cirrhosis of liver (HCC), sober since 2013    Peripheral edema    Bipolar disorder (HCC)    Type 2 diabetes mellitus with diabetic neuropathy, with long-term current use of insulin (HCC)    Essential hypertension, currently hypotensive    Dyslipidemia    Weakness    Acute metabolic encephalopathy    FABI (obstructive sleep apnea)    Primary osteoarthritis of right knee    Type 2 diabetes mellitus with diabetic neuropathy (HCC)    Acquired hypothyroidism    Urine retention    Anemia    Slow transit constipation    Iron deficiency anemia due to chronic blood loss    Gastroesophageal reflux disease without esophagitis    LEONARDA (acute kidney injury) (HCC)    Secondary esophageal varices without bleeding (HCC)    Portal hypertension (HCC)    Obesity, morbid, BMI 50 or higher (HCC)    Venous stasis dermatitis of both lower extremities    Cellulitis of perineum    Chronic indwelling Clemons catheter    Anxiety and depression    Back pain, chronic    BPH (benign prostatic hyperplasia)    Chronic diastolic CHF (congestive heart failure) (HCC)    Cirrhosis (HCC)    Diabetes mellitus (HCC)    GERD (gastroesophageal reflux disease)    Hepatitis    Hiatal hernia    Hypertension, essential    IBS (irritable bowel syndrome)    Morbid obesity with BMI of 45.0-49.9, adult (HCC)    Neuropathy    Chronic respiratory failure with hypoxia, on home oxygen therapy (HCC)    Sleep apnea    Thyroid disease    Urinary bladder neurogenic dysfunction    Acute UTI    Musculoskeletal immobility    Pyocystis    Myoclonic jerking    Thrombocytopenia (HCC)    Hypotension    Sepsis with acute hypercapnic respiratory failure and septic shock (HCC)    Right lower lobe pneumonia    Acute kidney injury superimposed on CKD (HCC)    Somnolence    Acute respiratory acidosis (HCC)     for developing partial small bowel obstruction.  CT scan may be considered for further evaluation.     XR CHEST PORTABLE    Result Date: 1/12/2024  EXAMINATION: ONE XRAY VIEW OF THE CHEST 1/12/2024 3:45 am COMPARISON: 01/11/2024, 444 hours HISTORY: ORDERING SYSTEM PROVIDED HISTORY: recent nec fasc TECHNOLOGIST PROVIDED HISTORY: recent nec fasc Reason for Exam: port upright 58-year-old male with recent necrotizing fasciitis FINDINGS: Portable upright view of the chest. Left IJ approach central venous catheter distal tip overlying the SVC. Tracheostomy tube distal tip overlying the mid trachea approximately 5.3 cm above the level of the wilian. No large obvious pneumothorax.  Small right-sided pleural effusion and right basilar airspace disease, stable.  Mild pulmonary vascular congestion. Stable cardiomegaly.  Cardiac and mediastinal contours remain unchanged. Visualized osseous structures remain unchanged.  Gaseous distension of the stomach.     1. Left IJ approach central venous catheter and tracheostomy tube as above. 2. Small right-sided pleural effusion and right basilar airspace disease, stable.  Mild pulmonary vascular congestion stable cardiomegaly.  Findings may represent CHF related changes but underlying infiltrate not excluded. Follow-up is recommended to document resolution.           Venkata Driver MD  1/13/2024, 8:28 AM

## 2024-01-13 NOTE — PLAN OF CARE
Problem: Respiratory - Adult  Goal: Achieves optimal ventilation and oxygenation  1/13/2024 0754 by Zachariah Crook, AGNIESZKA  Outcome: Progressing

## 2024-01-13 NOTE — BRIEF OP NOTE
Brief Postoperative Note      Patient: Ruben Dimas  YOB: 1965  MRN: [id number]    Date of Procedure: 1/13/2024    Pre-Op Diagnosis Codes:     * Necrotizing fasciitis (HCC) [M72.6]    Post-Op Diagnosis: Same       Procedure(s):  TAKE BACK ABDOMINAL  WOUND DEBRIDEMENT, WOUND BED PREPARATION, WOUND VAC CHANGE  (MISONIX)    Surgeon(s):  Mary Carmen Monte MD    Assistant:  Resident: Cat Yoo DO    Anesthesia: General    Estimated Blood Loss (mL): less than 15cc     Complications: None    Specimens:   * No specimens in log *    Implants:  * No implants in log *      Drains:   Closed/Suction Drain Lateral;Superior RUQ Bulb (Active)   Site Description Reddened;Leaking at site;Edema/Swelling 01/13/24 0400   Dressing Status Clean, dry & intact;New dressing applied 01/13/24 0400   Drainage Appearance Serous 01/13/24 0528   Drain Status Compressed;To bulb suction 01/13/24 0528   Output (ml) 100 ml 01/13/24 0528       Negative Pressure Wound Therapy Abdomen Medial;Upper (Active)   Wound Type Surgical 01/13/24 0400   Dressing Type Black Foam 01/13/24 0400   Cycle Continuous 01/13/24 0400   Target Pressure (mmHg) 150 01/13/24 0400   Canister changed? No 01/09/24 1600   Dressing Status Clean, dry & intact 01/13/24 0400   Dressing Changed Changed/New 01/10/24 1200   Drainage Amount Moderate 01/13/24 0400   Drainage Description Sanguinous 01/13/24 0400   Output (ml) 350 ml 01/11/24 1600   Wound Assessment Other (Comment) 01/13/24 0400   Yokasta-wound Assessment Other (Comment) 01/13/24 0400   Odor None 01/10/24 0800       Gastrostomy/Enterostomy/Jejunostomy Tube Gastrostomy RUQ 1 20 fr (Active)   Drainage Appearance Brown 01/10/24 0800   Site Description Dry;Intact;Leaking at site 01/13/24 0400   J Port Status Clamped 01/12/24 2000   G Port Status Other(comment) 01/13/24 0400   Surrounding Skin Reddened 01/13/24 0400   Dressing Status Other (Comment) 01/13/24 0400   Dressing Type Open to air 01/13/24 0400

## 2024-01-13 NOTE — PROGRESS NOTES
POST OP NOTE    SUBJECTIVE  Pt s/p abdominal wound debridement, wound vac change. .     OBJECTIVE  VITALS:  BP (!) 119/47   Pulse 64   Temp 98.2 °F (36.8 °C) (Oral)   Resp 16   Ht 1.778 m (5' 10\")   Wt (!) 156.2 kg (344 lb 6.4 oz)   SpO2 100%   BMI 49.42 kg/m²         GENERAL:  Intubated, not on sedation. GCS 11T.   CARDIOVASCULAR:  regular rate and rhythm   LUNGS:  Mechanical breath sounds bilaterally  ABDOMEN:   Wound vac on abdomen, tender to palpation   INCISION: Wound vac in place on abdomen, clean and dry.     ASSESSMENT  1. POD# 0 s/p abdominal wound debridement, wound vac change.     PLAN  1. Pain management- MMPT  2. DVT proph- subq heparin   3. Restart TF        Venkata Driver MD  Trauma/Surgery Service  1/13/2024 at 4:51 PM

## 2024-01-13 NOTE — PROGRESS NOTES
01/13/24 1037   Care Plan - Respiratory Goals   Achieves optimal ventilation and oxygenation Assess for changes in respiratory status;Position to facilitate oxygenation and minimize respiratory effort;Oxygen supplementation based on oxygen saturation or arterial blood gases;Assess the need for suctioning and aspirate as needed;Respiratory therapy support as indicated

## 2024-01-13 NOTE — PLAN OF CARE
Problem: Discharge Planning  Goal: Discharge to home or other facility with appropriate resources  Outcome: Progressing     Problem: Pain  Goal: Verbalizes/displays adequate comfort level or baseline comfort level  Outcome: Progressing     Problem: Safety - Adult  Goal: Free from fall injury  Outcome: Progressing     Problem: Respiratory - Adult  Goal: Achieves optimal ventilation and oxygenation  1/13/2024 1833 by Concepcion Simons RN  Outcome: Progressing  Flowsheets (Taken 1/13/2024 1037 by Yolanda Gay RCP)  Achieves optimal ventilation and oxygenation:   Assess for changes in respiratory status   Position to facilitate oxygenation and minimize respiratory effort   Oxygen supplementation based on oxygen saturation or arterial blood gases   Assess the need for suctioning and aspirate as needed   Respiratory therapy support as indicated  1/13/2024 0754 by Zachariah Crook RCP  Outcome: Progressing     Problem: Chronic Conditions and Co-morbidities  Goal: Patient's chronic conditions and co-morbidity symptoms are monitored and maintained or improved  Outcome: Progressing     Problem: Skin/Tissue Integrity  Goal: Absence of new skin breakdown  Description: 1.  Monitor for areas of redness and/or skin breakdown  2.  Assess vascular access sites hourly  3.  Every 4-6 hours minimum:  Change oxygen saturation probe site  4.  Every 4-6 hours:  If on nasal continuous positive airway pressure, respiratory therapy assess nares and determine need for appliance change or resting period.  Outcome: Progressing     Problem: ABCDS Injury Assessment  Goal: Absence of physical injury  Outcome: Progressing     Problem: Nutrition Deficit:  Goal: Optimize nutritional status  Outcome: Progressing

## 2024-01-13 NOTE — PROGRESS NOTES
[facility] -   Infectious diseases evaluation  admission date 1/1/2024    reason for consultation:   Necrotizing Faciitis    Impression :   Current:  1/1/24 Necrotizing faciitis 2/2 Polymicrobial infection with T2DM and PEG tube dislodgement and infected   LEONARDA on CKD   1/2/24 S/p partial wedge gastrectomy due to infected PEG tube and dislodgement   1/2/24 S/p debridement of abdomen and indwelling mesh removal,  due to concern for necrotizing fasciitis, wound vac in place  Cx abd wall 1/5 kleb x 2 and enterococcus and saccharomyces   1/3/24 GASTROSTOMY TUBE PLACEMENT, REPAIR OF LARGE VENTRAL DEFECT   Alcoholic Liver cirrhosis - found intra op  Elevated CRP  Lactic acidosis- initial 4.1 now 6.2  Bandemia leukemoid reaction- WBC 15 to 38 to 27 -45  Proteus UTI - treated  RLL pseudomonas pneumonia 1/1 - treated  Iv phlebitis -iv removed     Other:    Discussion / summary of stay / plan of care   Respiratory culture: pseudomonas multiS 1/1/24 -treated  U cx proteus multiS - treated  Bcx pending - SCN x 1  is a contamination  MRSA swab and COVID pending   cx of the abd wall  of 1/5/24 enterococcus and kleb and saccharomyces  Leukemoid reaction better 16 --27 fluctuating  Zyvox stopped 1/6 due to thrombocytopenia  Avoid fluconazole due to amiodarone  1/7 CT abd suggesting bilat LL atelectasis rather than  pneumonia   Recommendations   Treating large abd wound infection w kleb enterococcus and saccharomyces  Bandemia persistent and increasing  Iv phlebitis   Abd wound prep  1/8 -1/13  Keep meropenem 1/1 -1/13 -   micafungin  1/9/24 till 1/13 due to persistent bandemia,  cover the fungus  1/11 R arm iv phlebitis - added doxy-   1/13 WBC 27 -   Abd prep w mild duskiness of the wound - disc w Dr Monte  Trauma repeating CTAP - shows R blung collpase w large effusion, explaining the recent leukocytosis  repeat BC  - track CRP (221)              Infection Control Recommendations

## 2024-01-14 ENCOUNTER — ANESTHESIA EVENT (OUTPATIENT)
Dept: OPERATING ROOM | Age: 59
End: 2024-01-14
Payer: MEDICARE

## 2024-01-14 ENCOUNTER — APPOINTMENT (OUTPATIENT)
Dept: GENERAL RADIOLOGY | Age: 59
DRG: 853 | End: 2024-01-14
Payer: MEDICARE

## 2024-01-14 LAB
ALLEN TEST: ABNORMAL
ANION GAP SERPL CALCULATED.3IONS-SCNC: 12 MMOL/L (ref 9–17)
BASOPHILS # BLD: 0 K/UL (ref 0–0.2)
BASOPHILS NFR BLD: 0 % (ref 0–2)
BUN SERPL-MCNC: 38 MG/DL (ref 6–20)
CA-I BLD-SCNC: 1.28 MMOL/L (ref 1.13–1.33)
CALCIUM SERPL-MCNC: 8.5 MG/DL (ref 8.6–10.4)
CHLORIDE SERPL-SCNC: 109 MMOL/L (ref 98–107)
CO2 SERPL-SCNC: 15 MMOL/L (ref 20–31)
CREAT SERPL-MCNC: 1 MG/DL (ref 0.7–1.2)
CRP SERPL HS-MCNC: 208.6 MG/L (ref 0–5)
EOSINOPHIL # BLD: 0.46 K/UL (ref 0–0.44)
EOSINOPHILS RELATIVE PERCENT: 4 % (ref 1–4)
ERYTHROCYTE [DISTWIDTH] IN BLOOD BY AUTOMATED COUNT: 19.3 % (ref 11.8–14.4)
FIO2: 50
GFR SERPL CREATININE-BSD FRML MDRD: >60 ML/MIN/1.73M2
GLUCOSE BLD-MCNC: 110 MG/DL (ref 75–110)
GLUCOSE BLD-MCNC: 115 MG/DL (ref 75–110)
GLUCOSE BLD-MCNC: 128 MG/DL (ref 74–100)
GLUCOSE BLD-MCNC: 144 MG/DL (ref 75–110)
GLUCOSE BLD-MCNC: 174 MG/DL (ref 75–110)
GLUCOSE SERPL-MCNC: 173 MG/DL (ref 70–99)
HCT VFR BLD AUTO: 24 % (ref 40.7–50.3)
HGB BLD-MCNC: 7.1 G/DL (ref 13–17)
IMM GRANULOCYTES # BLD AUTO: 0.12 K/UL (ref 0–0.3)
IMM GRANULOCYTES NFR BLD: 1 %
LYMPHOCYTES NFR BLD: 0.58 K/UL (ref 1.1–3.7)
LYMPHOCYTES RELATIVE PERCENT: 5 % (ref 24–43)
MAGNESIUM SERPL-MCNC: 2.5 MG/DL (ref 1.6–2.6)
MCH RBC QN AUTO: 28.6 PG (ref 25.2–33.5)
MCHC RBC AUTO-ENTMCNC: 29.6 G/DL (ref 28.4–34.8)
MCV RBC AUTO: 96.8 FL (ref 82.6–102.9)
MODE: ABNORMAL
MONOCYTES NFR BLD: 0.69 K/UL (ref 0.1–1.2)
MONOCYTES NFR BLD: 6 % (ref 3–12)
MORPHOLOGY: ABNORMAL
MRSA, DNA, NASAL: ABNORMAL
NEGATIVE BASE EXCESS, ART: 12.5 MMOL/L (ref 0–2)
NEUTROPHILS NFR BLD: 84 % (ref 36–65)
NEUTS SEG NFR BLD: 9.65 K/UL (ref 1.5–8.1)
NRBC BLD-RTO: 0 PER 100 WBC
O2 DELIVERY DEVICE: ABNORMAL
PHOSPHATE SERPL-MCNC: 6.1 MG/DL (ref 2.5–4.5)
PLATELET # BLD AUTO: 119 K/UL (ref 138–453)
PMV BLD AUTO: 10.3 FL (ref 8.1–13.5)
POC HCO3: 14.3 MMOL/L (ref 21–28)
POC LACTIC ACID: 1.3 MMOL/L (ref 0.56–1.39)
POC O2 SATURATION: 99.6 % (ref 94–98)
POC PCO2: 35.4 MM HG (ref 35–48)
POC PH: 7.21 (ref 7.35–7.45)
POC PO2: 209 MM HG (ref 83–108)
POTASSIUM SERPL-SCNC: 4.1 MMOL/L (ref 3.7–5.3)
RBC # BLD AUTO: 2.48 M/UL (ref 4.21–5.77)
SAMPLE SITE: ABNORMAL
SODIUM SERPL-SCNC: 136 MMOL/L (ref 135–144)
SPECIMEN DESCRIPTION: ABNORMAL
WBC OTHER # BLD: 11.5 K/UL (ref 3.5–11.3)

## 2024-01-14 PROCEDURE — 82330 ASSAY OF CALCIUM: CPT

## 2024-01-14 PROCEDURE — 51798 US URINE CAPACITY MEASURE: CPT

## 2024-01-14 PROCEDURE — 82947 ASSAY GLUCOSE BLOOD QUANT: CPT

## 2024-01-14 PROCEDURE — 2000000000 HC ICU R&B

## 2024-01-14 PROCEDURE — 82803 BLOOD GASES ANY COMBINATION: CPT

## 2024-01-14 PROCEDURE — 6360000002 HC RX W HCPCS

## 2024-01-14 PROCEDURE — 6370000000 HC RX 637 (ALT 250 FOR IP)

## 2024-01-14 PROCEDURE — 83735 ASSAY OF MAGNESIUM: CPT

## 2024-01-14 PROCEDURE — 36430 TRANSFUSION BLD/BLD COMPNT: CPT

## 2024-01-14 PROCEDURE — P9016 RBC LEUKOCYTES REDUCED: HCPCS

## 2024-01-14 PROCEDURE — 94761 N-INVAS EAR/PLS OXIMETRY MLT: CPT

## 2024-01-14 PROCEDURE — 71045 X-RAY EXAM CHEST 1 VIEW: CPT

## 2024-01-14 PROCEDURE — 85025 COMPLETE CBC W/AUTO DIFF WBC: CPT

## 2024-01-14 PROCEDURE — 37799 UNLISTED PX VASCULAR SURGERY: CPT

## 2024-01-14 PROCEDURE — 83605 ASSAY OF LACTIC ACID: CPT

## 2024-01-14 PROCEDURE — 2700000000 HC OXYGEN THERAPY PER DAY

## 2024-01-14 PROCEDURE — 99024 POSTOP FOLLOW-UP VISIT: CPT | Performed by: SURGERY

## 2024-01-14 PROCEDURE — 2580000003 HC RX 258

## 2024-01-14 PROCEDURE — 6370000000 HC RX 637 (ALT 250 FOR IP): Performed by: STUDENT IN AN ORGANIZED HEALTH CARE EDUCATION/TRAINING PROGRAM

## 2024-01-14 PROCEDURE — 99233 SBSQ HOSP IP/OBS HIGH 50: CPT | Performed by: INTERNAL MEDICINE

## 2024-01-14 PROCEDURE — 94003 VENT MGMT INPAT SUBQ DAY: CPT

## 2024-01-14 PROCEDURE — 84100 ASSAY OF PHOSPHORUS: CPT

## 2024-01-14 PROCEDURE — 80048 BASIC METABOLIC PNL TOTAL CA: CPT

## 2024-01-14 PROCEDURE — 86140 C-REACTIVE PROTEIN: CPT

## 2024-01-14 RX ORDER — MIDODRINE HYDROCHLORIDE 5 MG/1
10 TABLET ORAL 3 TIMES DAILY
Status: CANCELLED | OUTPATIENT
Start: 2024-01-14

## 2024-01-14 RX ORDER — FENTANYL CITRATE 50 UG/ML
50 INJECTION, SOLUTION INTRAMUSCULAR; INTRAVENOUS ONCE
Status: DISCONTINUED | OUTPATIENT
Start: 2024-01-14 | End: 2024-01-14

## 2024-01-14 RX ORDER — SCOLOPAMINE TRANSDERMAL SYSTEM 1 MG/1
1 PATCH, EXTENDED RELEASE TRANSDERMAL
Status: DISCONTINUED | OUTPATIENT
Start: 2024-01-14 | End: 2024-01-17

## 2024-01-14 RX ORDER — SODIUM CHLORIDE 9 MG/ML
INJECTION, SOLUTION INTRAVENOUS PRN
Status: DISCONTINUED | OUTPATIENT
Start: 2024-01-14 | End: 2024-01-17

## 2024-01-14 RX ORDER — MIDODRINE HYDROCHLORIDE 5 MG/1
10 TABLET ORAL
Status: DISCONTINUED | OUTPATIENT
Start: 2024-01-15 | End: 2024-01-14

## 2024-01-14 RX ORDER — FUROSEMIDE 10 MG/ML
20 INJECTION INTRAMUSCULAR; INTRAVENOUS ONCE
Status: COMPLETED | OUTPATIENT
Start: 2024-01-14 | End: 2024-01-14

## 2024-01-14 RX ORDER — MIDODRINE HYDROCHLORIDE 5 MG/1
10 TABLET ORAL
Status: DISCONTINUED | OUTPATIENT
Start: 2024-01-14 | End: 2024-01-17

## 2024-01-14 RX ORDER — SUCRALFATE 1 G/1
1 TABLET ORAL ONCE
Status: DISCONTINUED | OUTPATIENT
Start: 2024-01-14 | End: 2024-01-22

## 2024-01-14 RX ADMIN — OXYCODONE HYDROCHLORIDE 10 MG: 5 TABLET ORAL at 23:41

## 2024-01-14 RX ADMIN — AMIODARONE HYDROCHLORIDE 200 MG: 200 TABLET ORAL at 08:41

## 2024-01-14 RX ADMIN — ACETAMINOPHEN 1000 MG: 650 SOLUTION ORAL at 21:03

## 2024-01-14 RX ADMIN — ASPIRIN 81 MG 81 MG: 81 TABLET ORAL at 08:39

## 2024-01-14 RX ADMIN — ACETAMINOPHEN 1000 MG: 650 SOLUTION ORAL at 12:14

## 2024-01-14 RX ADMIN — LANSOPRAZOLE 30 MG: 30 TABLET, ORALLY DISINTEGRATING, DELAYED RELEASE ORAL at 08:39

## 2024-01-14 RX ADMIN — HEPARIN SODIUM 7500 UNITS: 5000 INJECTION INTRAVENOUS; SUBCUTANEOUS at 04:45

## 2024-01-14 RX ADMIN — FENTANYL CITRATE 100 MCG: 50 INJECTION, SOLUTION INTRAMUSCULAR; INTRAVENOUS at 21:03

## 2024-01-14 RX ADMIN — METOCLOPRAMIDE 5 MG: 5 INJECTION, SOLUTION INTRAMUSCULAR; INTRAVENOUS at 23:42

## 2024-01-14 RX ADMIN — FENTANYL CITRATE 100 MCG: 50 INJECTION, SOLUTION INTRAMUSCULAR; INTRAVENOUS at 22:53

## 2024-01-14 RX ADMIN — FENTANYL CITRATE 100 MCG: 50 INJECTION, SOLUTION INTRAMUSCULAR; INTRAVENOUS at 12:14

## 2024-01-14 RX ADMIN — SENNOSIDES 8.8 MG: 8.8 LIQUID ORAL at 08:40

## 2024-01-14 RX ADMIN — OXYCODONE HYDROCHLORIDE 10 MG: 5 TABLET ORAL at 04:36

## 2024-01-14 RX ADMIN — DOXYCYCLINE HYCLATE 100 MG: 100 TABLET, COATED ORAL at 08:40

## 2024-01-14 RX ADMIN — FENTANYL CITRATE 100 MCG: 50 INJECTION, SOLUTION INTRAMUSCULAR; INTRAVENOUS at 17:22

## 2024-01-14 RX ADMIN — QUETIAPINE FUMARATE 50 MG: 25 TABLET ORAL at 08:39

## 2024-01-14 RX ADMIN — OXYCODONE HYDROCHLORIDE 10 MG: 5 TABLET ORAL at 16:25

## 2024-01-14 RX ADMIN — LANSOPRAZOLE 30 MG: 30 TABLET, ORALLY DISINTEGRATING, DELAYED RELEASE ORAL at 16:25

## 2024-01-14 RX ADMIN — SENNOSIDES 8.8 MG: 8.8 LIQUID ORAL at 21:03

## 2024-01-14 RX ADMIN — METOCLOPRAMIDE 5 MG: 5 INJECTION, SOLUTION INTRAMUSCULAR; INTRAVENOUS at 17:22

## 2024-01-14 RX ADMIN — OXYCODONE HYDROCHLORIDE 10 MG: 5 TABLET ORAL at 21:04

## 2024-01-14 RX ADMIN — DOCUSATE SODIUM LIQUID 100 MG: 100 LIQUID ORAL at 21:03

## 2024-01-14 RX ADMIN — HEPARIN SODIUM 7500 UNITS: 5000 INJECTION INTRAVENOUS; SUBCUTANEOUS at 21:04

## 2024-01-14 RX ADMIN — ALPRAZOLAM 0.25 MG: 0.25 TABLET ORAL at 22:57

## 2024-01-14 RX ADMIN — MIDODRINE HYDROCHLORIDE 10 MG: 5 TABLET ORAL at 22:57

## 2024-01-14 RX ADMIN — METOCLOPRAMIDE 5 MG: 5 INJECTION, SOLUTION INTRAMUSCULAR; INTRAVENOUS at 12:15

## 2024-01-14 RX ADMIN — FENTANYL CITRATE 100 MCG: 50 INJECTION, SOLUTION INTRAMUSCULAR; INTRAVENOUS at 08:58

## 2024-01-14 RX ADMIN — FUROSEMIDE 20 MG: 10 INJECTION, SOLUTION INTRAMUSCULAR; INTRAVENOUS at 12:14

## 2024-01-14 RX ADMIN — MEROPENEM 1000 MG: 1 INJECTION, POWDER, FOR SOLUTION INTRAVENOUS at 12:15

## 2024-01-14 RX ADMIN — LEVOTHYROXINE SODIUM 200 MCG: 100 TABLET ORAL at 10:14

## 2024-01-14 RX ADMIN — FENTANYL CITRATE 100 MCG: 50 INJECTION, SOLUTION INTRAMUSCULAR; INTRAVENOUS at 23:41

## 2024-01-14 RX ADMIN — HEPARIN SODIUM 7500 UNITS: 5000 INJECTION INTRAVENOUS; SUBCUTANEOUS at 12:14

## 2024-01-14 RX ADMIN — FENTANYL CITRATE 100 MCG: 50 INJECTION, SOLUTION INTRAMUSCULAR; INTRAVENOUS at 03:09

## 2024-01-14 RX ADMIN — ONDANSETRON 4 MG: 2 INJECTION INTRAMUSCULAR; INTRAVENOUS at 01:38

## 2024-01-14 RX ADMIN — DOCUSATE SODIUM LIQUID 100 MG: 100 LIQUID ORAL at 08:40

## 2024-01-14 RX ADMIN — OXYCODONE HYDROCHLORIDE 10 MG: 5 TABLET ORAL at 00:44

## 2024-01-14 RX ADMIN — OXYCODONE HYDROCHLORIDE 10 MG: 5 TABLET ORAL at 12:14

## 2024-01-14 RX ADMIN — OXYCODONE HYDROCHLORIDE 10 MG: 5 TABLET ORAL at 08:39

## 2024-01-14 RX ADMIN — ACETAMINOPHEN 1000 MG: 650 SOLUTION ORAL at 04:41

## 2024-01-14 RX ADMIN — FENTANYL CITRATE 100 MCG: 50 INJECTION, SOLUTION INTRAMUSCULAR; INTRAVENOUS at 05:53

## 2024-01-14 RX ADMIN — DOXYCYCLINE HYCLATE 100 MG: 100 TABLET, COATED ORAL at 21:04

## 2024-01-14 RX ADMIN — METOCLOPRAMIDE 5 MG: 5 INJECTION, SOLUTION INTRAMUSCULAR; INTRAVENOUS at 00:44

## 2024-01-14 RX ADMIN — QUETIAPINE FUMARATE 50 MG: 25 TABLET ORAL at 21:03

## 2024-01-14 RX ADMIN — MEROPENEM 1000 MG: 1 INJECTION, POWDER, FOR SOLUTION INTRAVENOUS at 04:43

## 2024-01-14 RX ADMIN — METOCLOPRAMIDE 5 MG: 5 INJECTION, SOLUTION INTRAMUSCULAR; INTRAVENOUS at 04:45

## 2024-01-14 RX ADMIN — FENTANYL CITRATE 100 MCG: 50 INJECTION, SOLUTION INTRAMUSCULAR; INTRAVENOUS at 10:09

## 2024-01-14 ASSESSMENT — PAIN SCALES - WONG BAKER
WONGBAKER_NUMERICALRESPONSE: 0

## 2024-01-14 ASSESSMENT — PULMONARY FUNCTION TESTS
PIF_VALUE: 27
PIF_VALUE: 24
PIF_VALUE: 25
PIF_VALUE: 16
PIF_VALUE: 11
PIF_VALUE: 25
PIF_VALUE: 24
PIF_VALUE: 25
PIF_VALUE: 22
PIF_VALUE: 23
PIF_VALUE: 19
PIF_VALUE: 12
PIF_VALUE: 19
PIF_VALUE: 25
PIF_VALUE: 22
PIF_VALUE: 22
PIF_VALUE: 17
PIF_VALUE: 23
PIF_VALUE: 23
PIF_VALUE: 21

## 2024-01-14 ASSESSMENT — PAIN SCALES - GENERAL
PAINLEVEL_OUTOF10: 0

## 2024-01-14 NOTE — PROGRESS NOTES
01/14/24 0757   Care Plan - Respiratory Goals   Achieves optimal ventilation and oxygenation Assess for changes in respiratory status;Assess for changes in mentation and behavior;Position to facilitate oxygenation and minimize respiratory effort;Respiratory therapy support as indicated;Assess the need for suctioning and aspirate as needed;Assess and instruct to report shortness of breath or any respiratory difficulty;Oxygen supplementation based on oxygen saturation or arterial blood gases

## 2024-01-14 NOTE — PLAN OF CARE
change or resting period.  Outcome: Progressing     Problem: ABCDS Injury Assessment  Goal: Absence of physical injury  Outcome: Progressing     Problem: Nutrition Deficit:  Goal: Optimize nutritional status  Outcome: Progressing

## 2024-01-14 NOTE — PROGRESS NOTES
Notified surgery resident face to face of need for urinary catheter due to urinary retention and multiple areas of infections of breakdown and wounds. Pt also going to surgery tomorrow. Resident ok with plan and urinary catheter placed via one attempt.

## 2024-01-14 NOTE — PROGRESS NOTES
ICU PROGRESS NOTE        PATIENT NAME: Ruben Dimas  MEDICAL RECORD NO. [id number]  DATE: 1/14/2024    HD: # 13    ASSESSMENT    Patient Active Problem List   Diagnosis    Alcoholic cirrhosis of liver (HCC), sober since 2013    Peripheral edema    Bipolar disorder (HCC)    Type 2 diabetes mellitus with diabetic neuropathy, with long-term current use of insulin (HCC)    Essential hypertension, currently hypotensive    Dyslipidemia    Weakness    Acute metabolic encephalopathy    FABI (obstructive sleep apnea)    Primary osteoarthritis of right knee    Type 2 diabetes mellitus with diabetic neuropathy (HCC)    Acquired hypothyroidism    Urine retention    Anemia    Slow transit constipation    Iron deficiency anemia due to chronic blood loss    Gastroesophageal reflux disease without esophagitis    LEONARDA (acute kidney injury) (HCC)    Secondary esophageal varices without bleeding (HCC)    Portal hypertension (HCC)    Obesity, morbid, BMI 50 or higher (HCC)    Venous stasis dermatitis of both lower extremities    Cellulitis of perineum    Chronic indwelling Clemons catheter    Anxiety and depression    Back pain, chronic    BPH (benign prostatic hyperplasia)    Chronic diastolic CHF (congestive heart failure) (HCC)    Cirrhosis (HCC)    Diabetes mellitus (HCC)    GERD (gastroesophageal reflux disease)    Hepatitis    Hiatal hernia    Hypertension, essential    IBS (irritable bowel syndrome)    Morbid obesity with BMI of 45.0-49.9, adult (HCC)    Neuropathy    Chronic respiratory failure with hypoxia, on home oxygen therapy (HCC)    Sleep apnea    Thyroid disease    Urinary bladder neurogenic dysfunction    Acute UTI    Musculoskeletal immobility    Pyocystis    Myoclonic jerking    Thrombocytopenia (HCC)    Hypotension    Sepsis with acute hypercapnic respiratory failure and septic shock (HCC)    Right lower lobe pneumonia    Acute kidney injury superimposed on CKD (HCC)    Somnolence    Acute respiratory acidosis (HCC)

## 2024-01-14 NOTE — CARE COORDINATION
Transitional care    Returned phone call to Deng  [phone] Eamon. Per trauma notes plan is to changed wound vac in OR tomorrow

## 2024-01-15 ENCOUNTER — ANESTHESIA (OUTPATIENT)
Dept: OPERATING ROOM | Age: 59
End: 2024-01-15
Payer: MEDICARE

## 2024-01-15 ENCOUNTER — APPOINTMENT (OUTPATIENT)
Dept: GENERAL RADIOLOGY | Age: 59
DRG: 853 | End: 2024-01-15
Payer: MEDICARE

## 2024-01-15 LAB
ABO/RH: NORMAL
ALBUMIN SERPL-MCNC: 2.1 G/DL (ref 3.5–5.2)
ALBUMIN/GLOB SERPL: 0.7 {RATIO} (ref 1–2.5)
ALLEN TEST: ABNORMAL
ALP SERPL-CCNC: 241 U/L (ref 40–129)
ALT SERPL-CCNC: 17 U/L (ref 5–41)
ANION GAP SERPL CALCULATED.3IONS-SCNC: 13 MMOL/L (ref 9–17)
ANTIBODY SCREEN: NEGATIVE
ARM BAND NUMBER: NORMAL
ARTERIAL PATENCY WRIST A: ABNORMAL
AST SERPL-CCNC: 31 U/L
BASOPHILS # BLD: 0.06 K/UL (ref 0–0.2)
BASOPHILS NFR BLD: 1 % (ref 0–2)
BILIRUB DIRECT SERPL-MCNC: 0.5 MG/DL
BILIRUB INDIRECT SERPL-MCNC: 0.5 MG/DL (ref 0–1)
BILIRUB SERPL-MCNC: 1 MG/DL (ref 0.3–1.2)
BLOOD BANK BLOOD PRODUCT EXPIRATION DATE: NORMAL
BLOOD BANK DISPENSE STATUS: NORMAL
BLOOD BANK ISBT PRODUCT BLOOD TYPE: 6200
BLOOD BANK PRODUCT CODE: NORMAL
BLOOD BANK SAMPLE EXPIRATION: NORMAL
BLOOD BANK UNIT TYPE AND RH: NORMAL
BODY TEMPERATURE: 37
BPU ID: NORMAL
BUN SERPL-MCNC: 36 MG/DL (ref 6–20)
CA-I BLD-SCNC: 1.25 MMOL/L (ref 1.13–1.33)
CA-I BLD-SCNC: 1.35 MMOL/L (ref 1.13–1.33)
CALCIUM SERPL-MCNC: 8.4 MG/DL (ref 8.6–10.4)
CHLORIDE SERPL-SCNC: 105 MMOL/L (ref 98–107)
CHLORIDE, WHOLE BLOOD: 110 MMOL/L (ref 98–110)
CO2 SERPL-SCNC: 14 MMOL/L (ref 20–31)
COHGB MFR BLD: 0.8 % (ref 0–5)
COMPONENT: NORMAL
CREAT SERPL-MCNC: 1.1 MG/DL (ref 0.7–1.2)
CROSSMATCH RESULT: NORMAL
CRP SERPL HS-MCNC: 188.9 MG/L (ref 0–5)
EOSINOPHIL # BLD: 1.01 K/UL (ref 0–0.44)
EOSINOPHILS RELATIVE PERCENT: 9 % (ref 1–4)
ERYTHROCYTE [DISTWIDTH] IN BLOOD BY AUTOMATED COUNT: 18.3 % (ref 11.8–14.4)
FIO2 ON VENT: ABNORMAL %
FIO2: 50
GFR SERPL CREATININE-BSD FRML MDRD: >60 ML/MIN/1.73M2
GLUCOSE BLD-MCNC: 127 MG/DL (ref 74–100)
GLUCOSE BLD-MCNC: 152 MG/DL (ref 75–110)
GLUCOSE BLD-MCNC: 153 MG/DL (ref 75–110)
GLUCOSE BLD-MCNC: 159 MG/DL (ref 75–110)
GLUCOSE SERPL-MCNC: 149 MG/DL (ref 70–99)
HCO3 ARTERIAL: 14.1 MMOL/L (ref 22–27)
HCT VFR BLD AUTO: 25.8 % (ref 40.7–50.3)
HCT VFR BLD CALC: 25.6 % (ref 40.7–50.3)
HEMOGLOBIN: 8.2 GM/DL (ref 13–17)
HGB BLD-MCNC: 8.2 G/DL (ref 13–17)
IMM GRANULOCYTES # BLD AUTO: 0.25 K/UL (ref 0–0.3)
IMM GRANULOCYTES NFR BLD: 2 %
LACTIC ACID, WHOLE BLOOD: 1.2 MMOL/L (ref 0.7–2.1)
LYMPHOCYTES NFR BLD: 1.18 K/UL (ref 1.1–3.7)
LYMPHOCYTES RELATIVE PERCENT: 10 % (ref 24–43)
MAGNESIUM SERPL-MCNC: 2.3 MG/DL (ref 1.6–2.6)
MCH RBC QN AUTO: 29.8 PG (ref 25.2–33.5)
MCHC RBC AUTO-ENTMCNC: 31.8 G/DL (ref 28.4–34.8)
MCV RBC AUTO: 93.8 FL (ref 82.6–102.9)
MODE: ABNORMAL
MONOCYTES NFR BLD: 0.8 K/UL (ref 0.1–1.2)
MONOCYTES NFR BLD: 7 % (ref 3–12)
NEGATIVE BASE EXCESS, ART: 12.2 MMOL/L (ref 0–2)
NEGATIVE BASE EXCESS, ART: 12.9 MMOL/L (ref 0–2)
NEUTROPHILS NFR BLD: 72 % (ref 36–65)
NEUTS SEG NFR BLD: 8.56 K/UL (ref 1.5–8.1)
NRBC BLD-RTO: 0 PER 100 WBC
O2 DELIVERY DEVICE: ABNORMAL
O2 SAT, ARTERIAL: 97.7 % (ref 94–100)
PCO2 ARTERIAL: 35.9 MMHG (ref 32–45)
PH ARTERIAL: 7.22 (ref 7.35–7.45)
PHOSPHATE SERPL-MCNC: 6 MG/DL (ref 2.5–4.5)
PLATELET # BLD AUTO: ABNORMAL K/UL (ref 138–453)
PLATELET, FLUORESCENCE: 141 K/UL (ref 138–453)
PLATELETS.RETICULATED NFR BLD AUTO: 2.1 % (ref 1.1–10.3)
PO2 ARTERIAL: 104 MMHG (ref 75–95)
POC HCO3: 14.1 MMOL/L (ref 21–28)
POC LACTIC ACID: 0.4 MMOL/L (ref 0.56–1.39)
POC O2 SATURATION: 99.7 % (ref 94–98)
POC PCO2: 35.9 MM HG (ref 35–48)
POC PH: 7.2 (ref 7.35–7.45)
POC PO2: 236.7 MM HG (ref 83–108)
POTASSIUM SERPL-SCNC: 3.8 MMOL/L (ref 3.7–5.3)
POTASSIUM, WHOLE BLOOD: 3.5 MMOL/L (ref 3.6–5)
PROT SERPL-MCNC: 5.3 G/DL (ref 6.4–8.3)
RBC # BLD AUTO: 2.75 M/UL (ref 4.21–5.77)
RBC # BLD: ABNORMAL 10*6/UL
SAMPLE SITE: ABNORMAL
SODIUM SERPL-SCNC: 132 MMOL/L (ref 135–144)
SODIUM, WHOLE BLOOD: 135 MMOL/L (ref 136–145)
TRANSFUSION STATUS: NORMAL
UNIT DIVISION: 0
UNIT ISSUE DATE/TIME: NORMAL
WBC OTHER # BLD: 11.9 K/UL (ref 3.5–11.3)

## 2024-01-15 PROCEDURE — 2720000010 HC SURG SUPPLY STERILE: Performed by: SURGERY

## 2024-01-15 PROCEDURE — 85014 HEMATOCRIT: CPT

## 2024-01-15 PROCEDURE — 6370000000 HC RX 637 (ALT 250 FOR IP)

## 2024-01-15 PROCEDURE — 3600000013 HC SURGERY LEVEL 3 ADDTL 15MIN: Performed by: SURGERY

## 2024-01-15 PROCEDURE — 83735 ASSAY OF MAGNESIUM: CPT

## 2024-01-15 PROCEDURE — 82947 ASSAY GLUCOSE BLOOD QUANT: CPT

## 2024-01-15 PROCEDURE — 2580000003 HC RX 258: Performed by: SURGERY

## 2024-01-15 PROCEDURE — 83605 ASSAY OF LACTIC ACID: CPT

## 2024-01-15 PROCEDURE — 6370000000 HC RX 637 (ALT 250 FOR IP): Performed by: STUDENT IN AN ORGANIZED HEALTH CARE EDUCATION/TRAINING PROGRAM

## 2024-01-15 PROCEDURE — 37799 UNLISTED PX VASCULAR SURGERY: CPT

## 2024-01-15 PROCEDURE — 2000000000 HC ICU R&B

## 2024-01-15 PROCEDURE — 87181 SC STD AGAR DILUTION PER AGT: CPT

## 2024-01-15 PROCEDURE — 89220 SPUTUM SPECIMEN COLLECTION: CPT

## 2024-01-15 PROCEDURE — 2580000003 HC RX 258

## 2024-01-15 PROCEDURE — 71045 X-RAY EXAM CHEST 1 VIEW: CPT

## 2024-01-15 PROCEDURE — 2700000000 HC OXYGEN THERAPY PER DAY

## 2024-01-15 PROCEDURE — 80048 BASIC METABOLIC PNL TOTAL CA: CPT

## 2024-01-15 PROCEDURE — 11005 DBRDMT SKIN ABDOMINAL WALL: CPT | Performed by: SURGERY

## 2024-01-15 PROCEDURE — 87205 SMEAR GRAM STAIN: CPT

## 2024-01-15 PROCEDURE — 2709999900 HC NON-CHARGEABLE SUPPLY: Performed by: SURGERY

## 2024-01-15 PROCEDURE — 84100 ASSAY OF PHOSPHORUS: CPT

## 2024-01-15 PROCEDURE — 87077 CULTURE AEROBIC IDENTIFY: CPT

## 2024-01-15 PROCEDURE — 3700000000 HC ANESTHESIA ATTENDED CARE: Performed by: SURGERY

## 2024-01-15 PROCEDURE — 3600000003 HC SURGERY LEVEL 3 BASE: Performed by: SURGERY

## 2024-01-15 PROCEDURE — 82805 BLOOD GASES W/O2 SATURATION: CPT

## 2024-01-15 PROCEDURE — 6360000002 HC RX W HCPCS

## 2024-01-15 PROCEDURE — 99291 CRITICAL CARE FIRST HOUR: CPT | Performed by: SURGERY

## 2024-01-15 PROCEDURE — 85055 RETICULATED PLATELET ASSAY: CPT

## 2024-01-15 PROCEDURE — 2500000003 HC RX 250 WO HCPCS

## 2024-01-15 PROCEDURE — 3700000001 HC ADD 15 MINUTES (ANESTHESIA): Performed by: SURGERY

## 2024-01-15 PROCEDURE — 99233 SBSQ HOSP IP/OBS HIGH 50: CPT | Performed by: INTERNAL MEDICINE

## 2024-01-15 PROCEDURE — 80076 HEPATIC FUNCTION PANEL: CPT

## 2024-01-15 PROCEDURE — 87186 SC STD MICRODIL/AGAR DIL: CPT

## 2024-01-15 PROCEDURE — 84132 ASSAY OF SERUM POTASSIUM: CPT

## 2024-01-15 PROCEDURE — 85025 COMPLETE CBC W/AUTO DIFF WBC: CPT

## 2024-01-15 PROCEDURE — 97606 NEG PRS WND THER DME>50 SQCM: CPT | Performed by: SURGERY

## 2024-01-15 PROCEDURE — 85018 HEMOGLOBIN: CPT

## 2024-01-15 PROCEDURE — 86140 C-REACTIVE PROTEIN: CPT

## 2024-01-15 PROCEDURE — 87070 CULTURE OTHR SPECIMN AEROBIC: CPT

## 2024-01-15 PROCEDURE — 82803 BLOOD GASES ANY COMBINATION: CPT

## 2024-01-15 PROCEDURE — 94003 VENT MGMT INPAT SUBQ DAY: CPT

## 2024-01-15 PROCEDURE — 87184 SC STD DISK METHOD PER PLATE: CPT

## 2024-01-15 PROCEDURE — 0JB80ZZ EXCISION OF ABDOMEN SUBCUTANEOUS TISSUE AND FASCIA, OPEN APPROACH: ICD-10-PCS | Performed by: SURGERY

## 2024-01-15 PROCEDURE — P9045 ALBUMIN (HUMAN), 5%, 250 ML: HCPCS

## 2024-01-15 PROCEDURE — 82330 ASSAY OF CALCIUM: CPT

## 2024-01-15 PROCEDURE — 94761 N-INVAS EAR/PLS OXIMETRY MLT: CPT

## 2024-01-15 RX ORDER — PROPOFOL 10 MG/ML
5-50 INJECTION, EMULSION INTRAVENOUS CONTINUOUS
Status: DISCONTINUED | OUTPATIENT
Start: 2024-01-15 | End: 2024-01-16

## 2024-01-15 RX ORDER — ALBUMIN, HUMAN INJ 5% 5 %
SOLUTION INTRAVENOUS PRN
Status: DISCONTINUED | OUTPATIENT
Start: 2024-01-15 | End: 2024-01-15 | Stop reason: SDUPTHER

## 2024-01-15 RX ORDER — MAGNESIUM HYDROXIDE 1200 MG/15ML
LIQUID ORAL CONTINUOUS PRN
Status: COMPLETED | OUTPATIENT
Start: 2024-01-15 | End: 2024-01-15

## 2024-01-15 RX ORDER — PROPOFOL 10 MG/ML
INJECTION, EMULSION INTRAVENOUS CONTINUOUS PRN
Status: DISCONTINUED | OUTPATIENT
Start: 2024-01-15 | End: 2024-01-15 | Stop reason: SDUPTHER

## 2024-01-15 RX ORDER — ROCURONIUM BROMIDE 10 MG/ML
INJECTION, SOLUTION INTRAVENOUS PRN
Status: DISCONTINUED | OUTPATIENT
Start: 2024-01-15 | End: 2024-01-15 | Stop reason: SDUPTHER

## 2024-01-15 RX ORDER — FENTANYL CITRATE 50 UG/ML
INJECTION, SOLUTION INTRAMUSCULAR; INTRAVENOUS PRN
Status: DISCONTINUED | OUTPATIENT
Start: 2024-01-15 | End: 2024-01-15 | Stop reason: SDUPTHER

## 2024-01-15 RX ORDER — SODIUM CHLORIDE, SODIUM LACTATE, POTASSIUM CHLORIDE, CALCIUM CHLORIDE 600; 310; 30; 20 MG/100ML; MG/100ML; MG/100ML; MG/100ML
INJECTION, SOLUTION INTRAVENOUS CONTINUOUS PRN
Status: DISCONTINUED | OUTPATIENT
Start: 2024-01-15 | End: 2024-01-15 | Stop reason: SDUPTHER

## 2024-01-15 RX ORDER — MIDAZOLAM HYDROCHLORIDE 1 MG/ML
INJECTION INTRAMUSCULAR; INTRAVENOUS PRN
Status: DISCONTINUED | OUTPATIENT
Start: 2024-01-15 | End: 2024-01-15 | Stop reason: SDUPTHER

## 2024-01-15 RX ADMIN — QUETIAPINE FUMARATE 50 MG: 25 TABLET ORAL at 08:24

## 2024-01-15 RX ADMIN — Medication 3000 MG: at 10:15

## 2024-01-15 RX ADMIN — HEPARIN SODIUM 7500 UNITS: 5000 INJECTION INTRAVENOUS; SUBCUTANEOUS at 12:17

## 2024-01-15 RX ADMIN — OXYCODONE HYDROCHLORIDE 10 MG: 5 TABLET ORAL at 16:49

## 2024-01-15 RX ADMIN — ALBUMIN (HUMAN) 12.5 G: 12.5 INJECTION, SOLUTION INTRAVENOUS at 10:28

## 2024-01-15 RX ADMIN — MIDODRINE HYDROCHLORIDE 10 MG: 5 TABLET ORAL at 08:24

## 2024-01-15 RX ADMIN — METOCLOPRAMIDE 5 MG: 5 INJECTION, SOLUTION INTRAMUSCULAR; INTRAVENOUS at 16:50

## 2024-01-15 RX ADMIN — DEXTROSE MONOHYDRATE: 50 INJECTION, SOLUTION INTRAVENOUS at 00:31

## 2024-01-15 RX ADMIN — FENTANYL CITRATE 100 MCG: 50 INJECTION, SOLUTION INTRAMUSCULAR; INTRAVENOUS at 02:45

## 2024-01-15 RX ADMIN — DEXTROSE MONOHYDRATE: 50 INJECTION, SOLUTION INTRAVENOUS at 08:28

## 2024-01-15 RX ADMIN — METOCLOPRAMIDE 5 MG: 5 INJECTION, SOLUTION INTRAMUSCULAR; INTRAVENOUS at 12:23

## 2024-01-15 RX ADMIN — ACETAMINOPHEN 1000 MG: 650 SOLUTION ORAL at 12:17

## 2024-01-15 RX ADMIN — SENNOSIDES 8.8 MG: 8.8 LIQUID ORAL at 08:24

## 2024-01-15 RX ADMIN — HEPARIN SODIUM 7500 UNITS: 5000 INJECTION INTRAVENOUS; SUBCUTANEOUS at 21:56

## 2024-01-15 RX ADMIN — OXYCODONE HYDROCHLORIDE 10 MG: 5 TABLET ORAL at 20:31

## 2024-01-15 RX ADMIN — OXYCODONE HYDROCHLORIDE 10 MG: 5 TABLET ORAL at 03:31

## 2024-01-15 RX ADMIN — FENTANYL CITRATE 50 MCG: 50 INJECTION, SOLUTION INTRAMUSCULAR; INTRAVENOUS at 10:00

## 2024-01-15 RX ADMIN — METOCLOPRAMIDE 5 MG: 5 INJECTION, SOLUTION INTRAMUSCULAR; INTRAVENOUS at 05:09

## 2024-01-15 RX ADMIN — ROCURONIUM BROMIDE 50 MG: 10 INJECTION INTRAVENOUS at 09:54

## 2024-01-15 RX ADMIN — ASPIRIN 81 MG 81 MG: 81 TABLET ORAL at 08:24

## 2024-01-15 RX ADMIN — SODIUM BICARBONATE 50 MEQ: 84 INJECTION, SOLUTION INTRAVENOUS at 10:28

## 2024-01-15 RX ADMIN — MIDAZOLAM 2 MG: 1 INJECTION INTRAMUSCULAR; INTRAVENOUS at 09:47

## 2024-01-15 RX ADMIN — FENTANYL CITRATE 50 MCG: 50 INJECTION, SOLUTION INTRAMUSCULAR; INTRAVENOUS at 10:39

## 2024-01-15 RX ADMIN — PROPOFOL 25 MCG/KG/MIN: 10 INJECTION, EMULSION INTRAVENOUS at 10:57

## 2024-01-15 RX ADMIN — MIDODRINE HYDROCHLORIDE 10 MG: 5 TABLET ORAL at 16:49

## 2024-01-15 RX ADMIN — DOCUSATE SODIUM LIQUID 100 MG: 100 LIQUID ORAL at 20:31

## 2024-01-15 RX ADMIN — FENTANYL CITRATE 100 MCG: 50 INJECTION, SOLUTION INTRAMUSCULAR; INTRAVENOUS at 03:40

## 2024-01-15 RX ADMIN — MIDODRINE HYDROCHLORIDE 10 MG: 5 TABLET ORAL at 12:17

## 2024-01-15 RX ADMIN — SENNOSIDES 8.8 MG: 8.8 LIQUID ORAL at 20:31

## 2024-01-15 RX ADMIN — PROPOFOL 25 MCG/KG/MIN: 10 INJECTION, EMULSION INTRAVENOUS at 12:24

## 2024-01-15 RX ADMIN — SODIUM CHLORIDE, POTASSIUM CHLORIDE, SODIUM LACTATE AND CALCIUM CHLORIDE: 600; 310; 30; 20 INJECTION, SOLUTION INTRAVENOUS at 09:45

## 2024-01-15 RX ADMIN — LANSOPRAZOLE 30 MG: 30 TABLET, ORALLY DISINTEGRATING, DELAYED RELEASE ORAL at 16:50

## 2024-01-15 RX ADMIN — ROCURONIUM BROMIDE 50 MG: 10 INJECTION INTRAVENOUS at 10:45

## 2024-01-15 RX ADMIN — FENTANYL CITRATE 100 MCG: 50 INJECTION, SOLUTION INTRAMUSCULAR; INTRAVENOUS at 01:27

## 2024-01-15 RX ADMIN — FENTANYL CITRATE 100 MCG: 50 INJECTION, SOLUTION INTRAMUSCULAR; INTRAVENOUS at 08:23

## 2024-01-15 RX ADMIN — ALBUMIN (HUMAN) 12.5 G: 12.5 INJECTION, SOLUTION INTRAVENOUS at 10:39

## 2024-01-15 RX ADMIN — DOCUSATE SODIUM LIQUID 100 MG: 100 LIQUID ORAL at 08:24

## 2024-01-15 RX ADMIN — OXYCODONE HYDROCHLORIDE 10 MG: 5 TABLET ORAL at 08:24

## 2024-01-15 RX ADMIN — OXYCODONE HYDROCHLORIDE 10 MG: 5 TABLET ORAL at 12:17

## 2024-01-15 RX ADMIN — SODIUM CHLORIDE, PRESERVATIVE FREE 10 ML: 5 INJECTION INTRAVENOUS at 08:24

## 2024-01-15 RX ADMIN — SODIUM CHLORIDE, POTASSIUM CHLORIDE, SODIUM LACTATE AND CALCIUM CHLORIDE: 600; 310; 30; 20 INJECTION, SOLUTION INTRAVENOUS at 11:02

## 2024-01-15 RX ADMIN — AMIODARONE HYDROCHLORIDE 200 MG: 200 TABLET ORAL at 08:25

## 2024-01-15 RX ADMIN — FENTANYL CITRATE 50 MCG: 50 INJECTION, SOLUTION INTRAMUSCULAR; INTRAVENOUS at 10:42

## 2024-01-15 RX ADMIN — Medication 6 MCG/MIN: at 03:08

## 2024-01-15 RX ADMIN — ACETAMINOPHEN 1000 MG: 650 SOLUTION ORAL at 21:56

## 2024-01-15 RX ADMIN — FENTANYL CITRATE 50 MCG: 50 INJECTION, SOLUTION INTRAMUSCULAR; INTRAVENOUS at 09:55

## 2024-01-15 RX ADMIN — MIDAZOLAM 2 MG: 1 INJECTION INTRAMUSCULAR; INTRAVENOUS at 10:45

## 2024-01-15 RX ADMIN — FENTANYL CITRATE 100 MCG: 50 INJECTION, SOLUTION INTRAMUSCULAR; INTRAVENOUS at 04:27

## 2024-01-15 ASSESSMENT — PULMONARY FUNCTION TESTS
PIF_VALUE: 25
PIF_VALUE: 28
PIF_VALUE: 28
PIF_VALUE: 20
PIF_VALUE: 26
PIF_VALUE: 29
PIF_VALUE: 27
PIF_VALUE: 24
PIF_VALUE: 35
PIF_VALUE: 24
PIF_VALUE: 23
PIF_VALUE: 23
PIF_VALUE: 31
PIF_VALUE: 24
PIF_VALUE: 28
PIF_VALUE: 27
PIF_VALUE: 28
PIF_VALUE: 26
PIF_VALUE: 22
PIF_VALUE: 28
PIF_VALUE: 25
PIF_VALUE: 26

## 2024-01-15 NOTE — OP NOTE
Operative Note      Patient: Ruben Dimas  YOB: 1965  MRN: [id number]    Date of Procedure: 1/15/2024    Pre-Op Diagnosis Codes:     * Necrotizing fasciitis (HCC) [M72.6]    Post-Op Diagnosis: Same       Procedure(s):  TAKE BACK ABDOMINAL  WOUND DEBRIDEMENT,  WOUND VAC CHANGE    Surgeon(s):  Fazal Wood MD    Assistant:   Resident: Nano Remy MD; Cat Yoo DO    Anesthesia: General    Estimated Blood Loss (mL): Less than 20cc    Complications: None    Specimens:   * No specimens in log *    Implants:  * No implants in log *      Drains:   Closed/Suction Drain Lateral;Superior RUQ Bulb (Active)   Site Description Reddened;Leaking at site;Edema/Swelling 01/15/24 1200   Dressing Status Clean, dry & intact;New dressing applied 01/15/24 1200   Drainage Appearance Serous 01/15/24 1200   Drain Status To bulb suction;Compressed 01/15/24 1200   Output (ml) 125 ml 01/15/24 1500       Negative Pressure Wound Therapy Abdomen Medial;Upper (Active)   Wound Type Surgical 01/15/24 1200   Dressing Type Black Foam 01/15/24 1200   Cycle Continuous 01/15/24 1200   Target Pressure (mmHg) 150 01/15/24 1200   Canister changed? No 01/15/24 1200   Dressing Status Clean, dry & intact 01/15/24 1200   Dressing Changed Changed/New 01/10/24 1200   Drainage Amount Other (Comment) 01/14/24 2000   Drainage Description Serosanguinous;Sanguinous 01/15/24 1200   Dressing Change Due 01/15/24 01/15/24 1200   Output (ml) 0 ml 01/15/24 1200   Wound Assessment Other (Comment) 01/14/24 2000   Yokasta-wound Assessment Other (Comment) 01/14/24 2000   Odor None 01/15/24 1200       Gastrostomy/Enterostomy/Jejunostomy Tube Gastrostomy RUQ 1 20 fr (Active)   Drainage Appearance Brown 01/15/24 1200   Site Description Intact;Leaking at site 01/15/24 1200   J Port Status Clamped 01/15/24 1200   G Port Status Irrigated 01/15/24 1200   Surrounding Skin Reddened 01/15/24 1200   Dressing Status Other (Comment) 01/15/24 1200   Dressing Type

## 2024-01-15 NOTE — CARE COORDINATION
Transitional Plan  Patient to OR for wound debridement, wound vac change.  Plan to reyturn to OR on Thursday for debridement and vac change and kikely to return to OR on Sunday for Integra placement.

## 2024-01-15 NOTE — BRIEF OP NOTE
bladder irrigation or bladder outlet obstruction 01/02/24 2000   Site Assessment Pink 01/02/24 2000   Urine Color Carisa 01/02/24 2000   Urine Appearance Clear 01/02/24 2000   Collection Container Standard 01/02/24 2000   Securement Method Securing device (Describe) 01/02/24 2000   Catheter Care  Soap and water 01/02/24 0800   Catheter Best Practices  Drainage tube clipped to bed;Catheter secured to thigh;Tamper seal intact;Bag below bladder;Bag not on floor;Lack of dependent loop in tubing;Drainage bag less than half full 01/02/24 2000   Status Draining 01/02/24 2000   Output (mL) 75 mL 01/02/24 1856       [REMOVED] Urinary Catheter 01/02/24 2 Way (Removed)   Catheter Indications Urinary retention (acute or chronic), continuous bladder irrigation or bladder outlet obstruction 01/10/24 0800   Site Assessment No urethral drainage 01/10/24 0800   Urine Color Yellow 01/10/24 1200   Urine Appearance Clear 01/10/24 1200   Collection Container Standard 01/10/24 1200   Securement Method Securing device (Describe) 01/10/24 1200   Catheter Care  Soap and water 01/10/24 1200   Catheter Best Practices  Drainage tube clipped to bed;Tamper seal intact;Bag below bladder;Bag not on floor;Lack of dependent loop in tubing;Drainage bag less than half full;Catheter secured to thigh 01/10/24 1200   Status Draining 01/10/24 0400   Output (mL) 50 mL 01/10/24 1600       [REMOVED] External Urinary Catheter (Removed)   Site Assessment Clean,dry & intact 01/11/24 0800   Placement Replaced 01/11/24 0800   Securement Method Securing device (Describe) 01/11/24 0800   Catheter Care Catheter/Wick replaced;Suction Canister/Tubing changed 01/11/24 0800   Perineal Care Yes 01/11/24 0800   Suction 40 mmgHg continuous 01/11/24 0800   Urine Color Yellow 01/11/24 0800   Urine Appearance Clear 01/11/24 0800       [REMOVED] External Urinary Catheter (Removed)   Site Assessment Intact 01/13/24 0528   Placement Replaced 01/13/24 0528   Securement Method Tape  01/13/24 0528   Catheter Care Suction Canister/Tubing changed 01/13/24 0528   Perineal Care Yes 01/13/24 0528   Suction 40 mmgHg continuous 01/13/24 0528   Urine Color Yellow 01/13/24 0528   Urine Appearance Clear 01/13/24 0528   Output (mL) 200 mL 01/14/24 1200       Findings: Excisional debridement of open wound with Mysonix (45cm x 35cm = 1575 sq cm), partial closure of abdominal wound, placement of negative pressure wound vac > 50 sq cm.      Previously placed biologic mesh with small area of disruption (~25 sq cm) at the epigastrum with exposed bowel.  This was covered with Adaptic over entire mesh area and then white foam.  Black foam over remaining wound area.      Plan for return to OR on Thur for debridement and vac change, then likely return to OR on Sun for Integra placement.      I was present and scrubbed for all critical portions of procedure.        Electronically signed by Fazal Wood MD on 1/15/2024 at 11:05 AM

## 2024-01-15 NOTE — ANESTHESIA PRE PROCEDURE
(TYLENOL) 160 MG/5ML solution 1,000 mg  1,000 mg Oral 3 times per day Jack Yooi, DO   1,000 mg at 01/14/24 2103   • docusate (COLACE) 50 MG/5ML liquid 100 mg  100 mg Oral BID Jack Yooi, DO   100 mg at 01/15/24 0824   • senna (SENOKOT) 8.8 MG/5ML syrup 8.8 mg  5 mL Oral BID Cat Yoo, DO   8.8 mg at 01/15/24 0824   • aspirin chewable tablet 81 mg  81 mg Per NG tube Daily Jack Yooi, DO   81 mg at 01/15/24 0824   • sodium chloride flush 0.9 % injection 5-40 mL  5-40 mL IntraVENous PRN Cat Yoo, DO   10 mL at 01/15/24 0824   • ondansetron (ZOFRAN-ODT) disintegrating tablet 4 mg  4 mg Oral Q8H PRN Cat Yoo, DO        Or   • ondansetron (ZOFRAN) injection 4 mg  4 mg IntraVENous Q6H PRN Cat Yoo, DO   4 mg at 01/14/24 0138   • albuterol (PROVENTIL) (2.5 MG/3ML) 0.083% nebulizer solution 2.5 mg  2.5 mg Nebulization Q2H PRN Cat Yoo, DO       • acetylcysteine (MUCOMYST) 20 % solution 600 mg  600 mg Inhalation As Directed RT PRN Cat Yoo, DO       • lidocaine PF 4 % injection 4 mL  4 mL Inhalation As Directed RT PRN Cat Yoo, DO       • levothyroxine (SYNTHROID) tablet 200 mcg  200 mcg Per G Tube Daily Cat Yoo, DO   200 mcg at 01/14/24 1014   • ALPRAZolam (XANAX) tablet 0.25 mg  0.25 mg PEG Tube Nightly PRN Cat Yoo, DO   0.25 mg at 01/14/24 2257       Allergies:    Allergies   Allergen Reactions   • No Known Allergies        Problem List:    Patient Active Problem List   Diagnosis Code   • Alcoholic cirrhosis of liver (HCC), sober since 2013 K70.30   • Peripheral edema R60.9   • Bipolar disorder (HCC) F31.9   • Type 2 diabetes mellitus with diabetic neuropathy, with long-term current use of insulin (HCC) E11.40, Z79.4   • Essential hypertension, currently hypotensive I10   • Dyslipidemia E78.5   • Weakness R53.1   • Acute metabolic encephalopathy G93.41   • FABI (obstructive sleep apnea) G47.33   • Primary osteoarthritis of right knee M17.11   • Type 2

## 2024-01-15 NOTE — PROGRESS NOTES
ICU PROGRESS NOTE        PATIENT NAME: Ruben Dimas  MEDICAL RECORD NO. [id number]  DATE: 1/15/2024    HD: # 14    ASSESSMENT    Patient Active Problem List   Diagnosis    Alcoholic cirrhosis of liver (HCC), sober since 2013    Peripheral edema    Bipolar disorder (HCC)    Type 2 diabetes mellitus with diabetic neuropathy, with long-term current use of insulin (HCC)    Essential hypertension, currently hypotensive    Dyslipidemia    Weakness    Acute metabolic encephalopathy    FABI (obstructive sleep apnea)    Primary osteoarthritis of right knee    Type 2 diabetes mellitus with diabetic neuropathy (HCC)    Acquired hypothyroidism    Urine retention    Anemia    Slow transit constipation    Iron deficiency anemia due to chronic blood loss    Gastroesophageal reflux disease without esophagitis    LEONARDA (acute kidney injury) (HCC)    Secondary esophageal varices without bleeding (HCC)    Portal hypertension (HCC)    Obesity, morbid, BMI 50 or higher (HCC)    Venous stasis dermatitis of both lower extremities    Cellulitis of perineum    Chronic indwelling Clemons catheter    Anxiety and depression    Back pain, chronic    BPH (benign prostatic hyperplasia)    Chronic diastolic CHF (congestive heart failure) (HCC)    Cirrhosis (HCC)    Diabetes mellitus (HCC)    GERD (gastroesophageal reflux disease)    Hepatitis    Hiatal hernia    Hypertension, essential    IBS (irritable bowel syndrome)    Morbid obesity with BMI of 45.0-49.9, adult (HCC)    Neuropathy    Chronic respiratory failure with hypoxia, on home oxygen therapy (HCC)    Sleep apnea    Thyroid disease    Urinary bladder neurogenic dysfunction    Acute UTI    Musculoskeletal immobility    Pyocystis    Myoclonic jerking    Thrombocytopenia (HCC)    Hypotension    Sepsis with acute hypercapnic respiratory failure and septic shock (HCC)    Right lower lobe pneumonia    Acute kidney injury superimposed on CKD (HCC)    Somnolence    Acute respiratory acidosis (HCC)     132/3.8  BUN/Cr: 36/1.1  Mag/P: 2.3/6.0  Ical: 1.25   UOP: 0.6 ml/kg/hr   Scrotal edema- scrotal elevation and frequent turning   S/p lasix 20mg once 1/14  Heme  DVT prophylaxis-heparin subq q8   1u PRBC required for symptomatic anemia on 1/7  Hgb: 8.2 (7.1) - received 1u pRBC yesterday 1/14  Plts: 141  7.   Endocrine  High dose SSI: holding for hypoglycemia  D5 @125cc/hr  Glucose: 127     ID  Afebrile  WBC: 11.9  LA: 0.4(1.3)  CRP 1/14: 208.6  Psuedomonas + respiratory culture on 1/1  Abdominal wound cx: Klebsiella pneumoniae, Enterococcus faecalis, Saccharomyces cerevisiae  Sputum cx pending  ID consulted, appreciate recs  Meropenem 1/1-1/13  Micafungin 1/9-1/13  Zyvox d/c on 1/6 due to thrombocytopenia   Doxycycline 1/12-1/14 for phlebitis  Believe leukocytosis is due to pleural effusion seen on CT   Lines  CVC L IJ (1/2), ALBIN drain in RUQ (1/3), Wound vac (1/2), G-tube (1/3), Trach (1/2), L radial artery line 1/4, wells 1/14    CHECKLIST    CAM-ICU RASS: +1  RESTRAINTS: none  IVF: D5 @125cc/hr  NUTRITION: TF @ 50ml/hr   ANTIBIOTICS: None  GI: Prevacid   DVT: Subq Heparin   GLYCEMIC CONTROL: HDSS  HOB >45: Yes  MOBILITY: PT/OT  SBT:   IS:   Wound care: Wound vac     Chief Complaint: \"Chest pain and shortness of breath\"    ANDREWS Dimas is a 58-year-old male who initially presented with chest pain and shortness of breath.  Patient has a past medical history of chronic respiratory failure with tracheostomy.  A-fib, not currently on anticoagulation due to history of GI bleed.  Congestive heart failure, diabetes, hypertension, hypothyroidism, alcoholic liver cirrhosis.  Patient presented from his care facility after he was noted to be desatting on his home ventilator.  Initial EKG was showing ST elevation and A-fib RVR.  Patient brought to the ER and was found to be in A-fib with RVR.  Patient had required pressor support and had a sepsis workup done.     Patient was later found to have large area of

## 2024-01-15 NOTE — PROGRESS NOTES
[facility] -   Infectious diseases evaluation  admission date 1/1/2024    reason for consultation:   Necrotizing Faciitis    Impression :   Current:  1/1/24 Necrotizing faciitis 2/2 Polymicrobial infection with T2DM and PEG tube dislodgement and infected   LEONARDA on CKD   1/2/24 S/p partial wedge gastrectomy due to infected PEG tube and dislodgement   1/2/24 S/p debridement of abdomen and indwelling mesh removal,  due to concern for necrotizing fasciitis, wound vac in place  Cx abd wall 1/5 kleb x 2 and enterococcus and saccharomyces   1/3/24 GASTROSTOMY TUBE PLACEMENT, REPAIR OF LARGE VENTRAL DEFECT   Alcoholic Liver cirrhosis - found intra op  Elevated CRP  Lactic acidosis- initial 4.1 now 6.2  Bandemia leukemoid reaction- WBC 15 to 38 to 27 -45  Proteus UTI - treated  RLL pseudomonas pneumonia 1/1 - treated  Iv phlebitis -iv removed     Other:    Discussion / summary of stay / plan of care   Respiratory culture: pseudomonas multiS 1/1/24 -treated  U cx proteus multiS - treated  Bcx pending - SCN x 1  is a contamination  MRSA swab and COVID pending   cx of the abd wall  of 1/5/24 enterococcus and kleb and saccharomyces  Leukemoid reaction better 16 --27 fluctuating  Zyvox stopped 1/6 due to thrombocytopenia  Avoid fluconazole due to amiodarone  1/7 CT abd suggesting bilat LL atelectasis rather than  pneumonia   micafungin  1/9/24 - stop 1/13  Given due to persistent bandemia,  cover the fungus  1/11 R arm iv phlebitis - added doxy- stop 1/14  Recommendations   Treating large abd wound infection w kleb enterococcus and saccharomyces  Bandemia persistent and increasing  Iv phlebitis   Abd wound prep  1/8 -1/13  Keep meropenem 1/1 -1/13 -   1/13 WBC 27 -   Abd prep w mild duskiness of the wound - disc w Dr Monte  Trauma repeating CTAP - shows R blung collpase w large effusion, explaining the recent leukocytosis  repeat BC  - track CRP (221)  1/14 WBC 11 but CXR  OR    ENDOSCOPY, COLON, DIAGNOSTIC      HERNIA REPAIR      INCISIONAL HERNIA REPAIR  05/20/2018    repair and reduction of recurrent incarcerated incisional hernia with mesh    LAPAROTOMY N/A 1/2/2024    LAPAROTOMY EXPLORATORY, DEBRIDEMENT OF SUBQ TISSUE INCLUDING FASCIA 50X44, APPLICATION OF WOUND VAC >50 SQ CM, PARTIAL GASTRECTOMY, EXTENSIVE LYSIS OF ADHESIONS performed by Escobar Ulloa MD at [facility] OR    LAPAROTOMY N/A 1/3/2024    REOPEN LAPAROTOMY, WOUND VAC PLACEMENT, OPEN GASTROSTOMY TUBE PLACEMENT, REPAIR OF LARGE VENTRAL DEFECT WITH DEVON MATRIX, DEBRIDEMENT OF SUBQUTANEOUS TISSUE AND MUSCLE, ALBIN DRAINS X2 performed by Mary Carmen Monte MD at [facility] OR    NC CYSTOURETHROSCOPY N/A 01/24/2018    CYSTOSCOPY /DILATION performed by Fazal Guallpa MD at [facility] OR    NC EGD TRANSORAL BIOPSY SINGLE/MULTIPLE N/A 07/19/2017    EGD BIOPSY performed by Mike Gu MD at [facility] OR    NC I&D BELOW FASCIA FOOT 1 BURSAL SPACE Bilateral 08/22/2017    FOOT DEBRIDEMENT INCISION AND DRAINAGE with bone bx performed by Porfirio Hernandez DPM at [facility] OR    TRACHEOSTOMY N/A 11/8/2023    TRACHEOTOMY performed by Mary Carmen Monte MD at [facility] OR    UPPER GASTROINTESTINAL ENDOSCOPY  07/19/2017    polypectomy    UPPER GASTROINTESTINAL ENDOSCOPY N/A 03/14/2019    EGD BIOPSY performed by Juan Arriaga MD at [facility] Endoscopy    UPPER GASTROINTESTINAL ENDOSCOPY N/A 04/18/2023    EGD ESOPHAGOGASTRODUODENOSCOPY performed by Dorian Rendon MD at [facility] OR    UPPER GASTROINTESTINAL ENDOSCOPY N/A 11/8/2023    EGD ESOPHAGOGASTRODUODENOSCOPY PEG TUBE INSERTION performed by Mary Carmen Monte MD at [facility] OR    VENTRAL HERNIA REPAIR N/A 05/20/2018    REPAIR AND REDUCTION OF RECURRENT INCARCERATED INCISIONAL HERNIA WITH MESH performed by Bijan Camejo MD at [facility] OR       Medications:      sucralfate  1 g Oral Once    scopolamine  1 patch TransDERmal Q72H    midodrine  10 mg Oral TID WC    metoclopramide  5 mg IntraVENous Q6H

## 2024-01-15 NOTE — CONSENT
Informed Consent for Blood Component Transfusion Note    I have discussed with the patient the rationale for blood component transfusion; its benefits in treating or preventing fatigue, organ damage, or death; and its risk which includes mild transfusion reactions, rare risk of blood borne infection, or more serious but rare reactions. I have discussed the alternatives to transfusion, including the risk and consequences of not receiving transfusion. The patient had an opportunity to ask questions and had agreed to proceed with transfusion of blood components.    Electronically signed by Venkata Driver MD on 1/15/24 at 7:50 AM EST

## 2024-01-15 NOTE — PROGRESS NOTES
PALLIATIVE CARE NURSING ASSESSMENT    Patient: Ruben Dimas  Room: 1014/1014-01    Reason For Consult   Goals of care evaluation  Distress management  Guidance and support  Facilitate communications  Assistance in coordinating care    Code Status:Full code    Summary:  Palliative care visit to bedside.   Decision maker per [place]:  patient's 4 adult children: Richi, Igor, Tony and Margaux.  Back to or today for  TAKE BACK ABDOMINAL WOUND DEBRIDEMENT, WOUND VAC CHANGE  Plan to go back to or Thursday(debridment and wound vac change) and Sunday (Integra placement)  Pt resting quietly since surgery, allowed patient to continue to rest.   Trach to ventilator, tube feedings on, levophed gtt on.    Pt has 4 siblings that are decision makers per [place].   Palliative care will continue to follow for support and goals of care.        Impression: Ruben Dimas is a 58 y.o. year old male  has a past medical history of A-fib (HCC), Anxiety and depression, Back pain, chronic, BPH (benign prostatic hyperplasia), Cellulitis of left lower extremity, Cellulitis of right lower extremity, CHF (congestive heart failure) ([facility]), Chronic respiratory failure with hypoxia (HCC), Cirrhosis (HCC), Diabetes mellitus (HCC), Epididymoorchitis, GERD (gastroesophageal reflux disease), H/O cardiac catheterization, Hepatitis, Hiatal hernia, History of alcohol abuse, Hyperlipidemia, Hypertension, IBS (irritable bowel syndrome), Incisional hernia with obstruction but no gangrene, Klebsiella sepsis ([facility]), Metabolic encephalopathy, Morbid obesity ([facility]), Neuropathy, On home oxygen therapy, On home oxygen therapy, Pancreatitis, Poisoning by insulin and oral hypoglycemic (antidiabetic) drugs, accidental (unintentional), initial encounter, Primary osteoarthritis of right knee, Retention, urine, SBO (small bowel obstruction) ([facility]), Secondary hypercoagulable state (HCC), Septic shock ([facility]), Sleep apnea, Sleep apnea, obstructive, Thyroid disease, Under  care of team, Urinary bladder neurogenic dysfunction, and Urinary tract infection associated with indwelling urethral catheter (HCC)..  Currently hospitalized for the management of septic shock.  The Palliative Care Team is following to assist with patient and family support, goals of care.     Vital Signs  Blood pressure (!) 121/46, pulse 73, temperature 97.9 °F (36.6 °C), resp. rate 16, height 1.778 m (5' 10\"), weight (!) 157.5 kg (347 lb 3.2 oz), SpO2 100 %.    Patient Active Problem List   Diagnosis    Alcoholic cirrhosis of liver (HCC), sober since 2013    Peripheral edema    Bipolar disorder (HCC)    Type 2 diabetes mellitus with diabetic neuropathy, with long-term current use of insulin (HCC)    Essential hypertension, currently hypotensive    Dyslipidemia    Weakness    Acute metabolic encephalopathy    FABI (obstructive sleep apnea)    Primary osteoarthritis of right knee    Type 2 diabetes mellitus with diabetic neuropathy (HCC)    Acquired hypothyroidism    Urine retention    Anemia    Slow transit constipation    Iron deficiency anemia due to chronic blood loss    Gastroesophageal reflux disease without esophagitis    LEONARDA (acute kidney injury) (HCC)    Secondary esophageal varices without bleeding (HCC)    Portal hypertension (HCC)    Obesity, morbid, BMI 50 or higher (HCC)    Venous stasis dermatitis of both lower extremities    Cellulitis of perineum    Chronic indwelling Clemons catheter    Anxiety and depression    Back pain, chronic    BPH (benign prostatic hyperplasia)    Chronic diastolic CHF (congestive heart failure) (HCC)    Cirrhosis (HCC)    Diabetes mellitus (HCC)    GERD (gastroesophageal reflux disease)    Hepatitis    Hiatal hernia    Hypertension, essential    IBS (irritable bowel syndrome)    Morbid obesity with BMI of 45.0-49.9, adult (HCC)    Neuropathy    Chronic respiratory failure with hypoxia, on home oxygen therapy (HCC)    Sleep apnea    Thyroid disease    Urinary bladder

## 2024-01-15 NOTE — ANESTHESIA POSTPROCEDURE EVALUATION
Department of Anesthesiology  Postprocedure Note    Patient: Ruben Dimas  MRN: [id number]  YOB: 1965  Date of evaluation: 1/15/2024    Procedure Summary     Date: 01/15/24 Room / Location: [address]    Anesthesia Start: 0946 Anesthesia Stop: 1135    Procedure: TAKE BACK ABDOMINAL  WOUND DEBRIDEMENT,  WOUND VAC CHANGE (Abdomen) Diagnosis:       Necrotizing fasciitis (HCC)      (Necrotizing fasciitis (HCC) [M72.6])    Surgeons: Fazal Wood MD Responsible Provider: Tesfaye Alfaro MD    Anesthesia Type: general ASA Status: 4          Anesthesia Type: No value filed.    Des Phase I:      Des Phase II:      Anesthesia Post Evaluation    Patient location during evaluation: ICU  Patient participation: complete - patient cannot participate  Level of consciousness: sedated and ventilated  Airway patency: patent  Cardiovascular status: vasoactive/inotropes  Respiratory status: ventilator        No notable events documented.

## 2024-01-16 ENCOUNTER — APPOINTMENT (OUTPATIENT)
Dept: GENERAL RADIOLOGY | Age: 59
DRG: 853 | End: 2024-01-16
Payer: MEDICARE

## 2024-01-16 ENCOUNTER — APPOINTMENT (OUTPATIENT)
Age: 59
DRG: 853 | End: 2024-01-16
Payer: MEDICARE

## 2024-01-16 LAB
ALLEN TEST: ABNORMAL
ANION GAP SERPL CALCULATED.3IONS-SCNC: 11 MMOL/L (ref 9–17)
BASOPHILS # BLD: 0.08 K/UL (ref 0–0.2)
BASOPHILS NFR BLD: 1 % (ref 0–2)
BUN SERPL-MCNC: 32 MG/DL (ref 6–20)
CA-I BLD-SCNC: 1.26 MMOL/L (ref 1.13–1.33)
CALCIUM SERPL-MCNC: 8.4 MG/DL (ref 8.6–10.4)
CHLORIDE SERPL-SCNC: 108 MMOL/L (ref 98–107)
CO2 SERPL-SCNC: 16 MMOL/L (ref 20–31)
CREAT SERPL-MCNC: 1 MG/DL (ref 0.7–1.2)
CRP SERPL HS-MCNC: 200.2 MG/L (ref 0–5)
EOSINOPHIL # BLD: 1.26 K/UL (ref 0–0.44)
EOSINOPHILS RELATIVE PERCENT: 9 % (ref 1–4)
ERYTHROCYTE [DISTWIDTH] IN BLOOD BY AUTOMATED COUNT: 18.1 % (ref 11.8–14.4)
FIO2: 50
GFR SERPL CREATININE-BSD FRML MDRD: >60 ML/MIN/1.73M2
GLUCOSE BLD-MCNC: 135 MG/DL (ref 75–110)
GLUCOSE BLD-MCNC: 140 MG/DL (ref 74–100)
GLUCOSE BLD-MCNC: 162 MG/DL (ref 75–110)
GLUCOSE BLD-MCNC: 174 MG/DL (ref 75–110)
GLUCOSE BLD-MCNC: 177 MG/DL (ref 75–110)
GLUCOSE BLD-MCNC: 195 MG/DL (ref 75–110)
GLUCOSE SERPL-MCNC: 166 MG/DL (ref 70–99)
HCT VFR BLD AUTO: 24.5 % (ref 40.7–50.3)
HGB BLD-MCNC: 7.7 G/DL (ref 13–17)
IMM GRANULOCYTES # BLD AUTO: 0.25 K/UL (ref 0–0.3)
IMM GRANULOCYTES NFR BLD: 2 %
LYMPHOCYTES NFR BLD: 1.23 K/UL (ref 1.1–3.7)
LYMPHOCYTES RELATIVE PERCENT: 9 % (ref 24–43)
MAGNESIUM SERPL-MCNC: 2.2 MG/DL (ref 1.6–2.6)
MCH RBC QN AUTO: 28.9 PG (ref 25.2–33.5)
MCHC RBC AUTO-ENTMCNC: 31.4 G/DL (ref 28.4–34.8)
MCV RBC AUTO: 92.1 FL (ref 82.6–102.9)
MICROORGANISM SPEC CULT: NORMAL
MICROORGANISM SPEC CULT: NORMAL
MONOCYTES NFR BLD: 0.86 K/UL (ref 0.1–1.2)
MONOCYTES NFR BLD: 6 % (ref 3–12)
NEGATIVE BASE EXCESS, ART: 10 MMOL/L (ref 0–2)
NEUTROPHILS NFR BLD: 74 % (ref 36–65)
NEUTS SEG NFR BLD: 10.71 K/UL (ref 1.5–8.1)
NRBC BLD-RTO: 0 PER 100 WBC
O2 DELIVERY DEVICE: ABNORMAL
PHOSPHATE SERPL-MCNC: 6 MG/DL (ref 2.5–4.5)
PLATELET # BLD AUTO: 155 K/UL (ref 138–453)
PMV BLD AUTO: 9.7 FL (ref 8.1–13.5)
POC HCO3: 15.5 MMOL/L (ref 21–28)
POC LACTIC ACID: 0.3 MMOL/L (ref 0.56–1.39)
POC O2 SATURATION: 99.7 % (ref 94–98)
POC PCO2: 32.1 MM HG (ref 35–48)
POC PH: 7.29 (ref 7.35–7.45)
POC PO2: 229.9 MM HG (ref 83–108)
POTASSIUM SERPL-SCNC: 3.4 MMOL/L (ref 3.7–5.3)
RBC # BLD AUTO: 2.66 M/UL (ref 4.21–5.77)
RBC # BLD: ABNORMAL 10*6/UL
SAMPLE SITE: ABNORMAL
SERVICE CMNT-IMP: NORMAL
SERVICE CMNT-IMP: NORMAL
SODIUM SERPL-SCNC: 135 MMOL/L (ref 135–144)
SPECIMEN DESCRIPTION: NORMAL
SPECIMEN DESCRIPTION: NORMAL
WBC OTHER # BLD: 14.4 K/UL (ref 3.5–11.3)

## 2024-01-16 PROCEDURE — 82330 ASSAY OF CALCIUM: CPT

## 2024-01-16 PROCEDURE — 82947 ASSAY GLUCOSE BLOOD QUANT: CPT

## 2024-01-16 PROCEDURE — 86140 C-REACTIVE PROTEIN: CPT

## 2024-01-16 PROCEDURE — 71260 CT THORAX DX C+: CPT

## 2024-01-16 PROCEDURE — 6360000002 HC RX W HCPCS

## 2024-01-16 PROCEDURE — 80048 BASIC METABOLIC PNL TOTAL CA: CPT

## 2024-01-16 PROCEDURE — 99233 SBSQ HOSP IP/OBS HIGH 50: CPT | Performed by: SURGERY

## 2024-01-16 PROCEDURE — 94003 VENT MGMT INPAT SUBQ DAY: CPT

## 2024-01-16 PROCEDURE — 83605 ASSAY OF LACTIC ACID: CPT

## 2024-01-16 PROCEDURE — 99233 SBSQ HOSP IP/OBS HIGH 50: CPT | Performed by: INTERNAL MEDICINE

## 2024-01-16 PROCEDURE — 2700000000 HC OXYGEN THERAPY PER DAY

## 2024-01-16 PROCEDURE — 6370000000 HC RX 637 (ALT 250 FOR IP)

## 2024-01-16 PROCEDURE — 83735 ASSAY OF MAGNESIUM: CPT

## 2024-01-16 PROCEDURE — 6360000002 HC RX W HCPCS: Performed by: INTERNAL MEDICINE

## 2024-01-16 PROCEDURE — 84100 ASSAY OF PHOSPHORUS: CPT

## 2024-01-16 PROCEDURE — 85025 COMPLETE CBC W/AUTO DIFF WBC: CPT

## 2024-01-16 PROCEDURE — 6360000002 HC RX W HCPCS: Performed by: NURSE PRACTITIONER

## 2024-01-16 PROCEDURE — 6360000004 HC RX CONTRAST MEDICATION

## 2024-01-16 PROCEDURE — 94761 N-INVAS EAR/PLS OXIMETRY MLT: CPT

## 2024-01-16 PROCEDURE — 2500000003 HC RX 250 WO HCPCS

## 2024-01-16 PROCEDURE — 82803 BLOOD GASES ANY COMBINATION: CPT

## 2024-01-16 PROCEDURE — 2000000000 HC ICU R&B

## 2024-01-16 PROCEDURE — 2580000003 HC RX 258

## 2024-01-16 PROCEDURE — 6370000000 HC RX 637 (ALT 250 FOR IP): Performed by: EMERGENCY MEDICINE

## 2024-01-16 PROCEDURE — 2580000003 HC RX 258: Performed by: INTERNAL MEDICINE

## 2024-01-16 PROCEDURE — 71045 X-RAY EXAM CHEST 1 VIEW: CPT

## 2024-01-16 PROCEDURE — 37799 UNLISTED PX VASCULAR SURGERY: CPT

## 2024-01-16 RX ADMIN — HEPARIN SODIUM 7500 UNITS: 5000 INJECTION INTRAVENOUS; SUBCUTANEOUS at 15:42

## 2024-01-16 RX ADMIN — HYDROMORPHONE HYDROCHLORIDE 0.5 MG: 1 INJECTION, SOLUTION INTRAMUSCULAR; INTRAVENOUS; SUBCUTANEOUS at 02:00

## 2024-01-16 RX ADMIN — HEPARIN SODIUM 7500 UNITS: 5000 INJECTION INTRAVENOUS; SUBCUTANEOUS at 21:21

## 2024-01-16 RX ADMIN — MEROPENEM 1000 MG: 1 INJECTION, POWDER, FOR SOLUTION INTRAVENOUS at 07:33

## 2024-01-16 RX ADMIN — METOCLOPRAMIDE 5 MG: 5 INJECTION, SOLUTION INTRAMUSCULAR; INTRAVENOUS at 18:22

## 2024-01-16 RX ADMIN — MEROPENEM 1000 MG: 1 INJECTION, POWDER, FOR SOLUTION INTRAVENOUS at 23:11

## 2024-01-16 RX ADMIN — ACETAMINOPHEN 1000 MG: 650 SOLUTION ORAL at 21:21

## 2024-01-16 RX ADMIN — DOCUSATE SODIUM LIQUID 100 MG: 100 LIQUID ORAL at 20:04

## 2024-01-16 RX ADMIN — SENNOSIDES 8.8 MG: 8.8 LIQUID ORAL at 08:47

## 2024-01-16 RX ADMIN — Medication 12 MCG/MIN: at 12:27

## 2024-01-16 RX ADMIN — DOCUSATE SODIUM LIQUID 100 MG: 100 LIQUID ORAL at 08:48

## 2024-01-16 RX ADMIN — LEVOTHYROXINE SODIUM 200 MCG: 100 TABLET ORAL at 06:12

## 2024-01-16 RX ADMIN — ACETAMINOPHEN 1000 MG: 650 SOLUTION ORAL at 13:16

## 2024-01-16 RX ADMIN — IOPAMIDOL 75 ML: 755 INJECTION, SOLUTION INTRAVENOUS at 12:52

## 2024-01-16 RX ADMIN — OXYCODONE HYDROCHLORIDE 10 MG: 5 TABLET ORAL at 08:48

## 2024-01-16 RX ADMIN — POTASSIUM BICARBONATE 40 MEQ: 782 TABLET, EFFERVESCENT ORAL at 07:33

## 2024-01-16 RX ADMIN — OXYCODONE HYDROCHLORIDE 10 MG: 5 TABLET ORAL at 04:12

## 2024-01-16 RX ADMIN — HEPARIN SODIUM 7500 UNITS: 5000 INJECTION INTRAVENOUS; SUBCUTANEOUS at 06:12

## 2024-01-16 RX ADMIN — HYDROMORPHONE HYDROCHLORIDE 0.5 MG: 1 INJECTION, SOLUTION INTRAMUSCULAR; INTRAVENOUS; SUBCUTANEOUS at 04:12

## 2024-01-16 RX ADMIN — ASPIRIN 81 MG 81 MG: 81 TABLET ORAL at 08:48

## 2024-01-16 RX ADMIN — MIDODRINE HYDROCHLORIDE 10 MG: 5 TABLET ORAL at 16:33

## 2024-01-16 RX ADMIN — SODIUM CHLORIDE, PRESERVATIVE FREE 10 ML: 5 INJECTION INTRAVENOUS at 20:04

## 2024-01-16 RX ADMIN — OXYCODONE HYDROCHLORIDE 10 MG: 5 TABLET ORAL at 20:04

## 2024-01-16 RX ADMIN — HYDROMORPHONE HYDROCHLORIDE 0.5 MG: 1 INJECTION, SOLUTION INTRAMUSCULAR; INTRAVENOUS; SUBCUTANEOUS at 13:42

## 2024-01-16 RX ADMIN — AMIODARONE HYDROCHLORIDE 200 MG: 200 TABLET ORAL at 08:50

## 2024-01-16 RX ADMIN — HYDROMORPHONE HYDROCHLORIDE 0.5 MG: 1 INJECTION, SOLUTION INTRAMUSCULAR; INTRAVENOUS; SUBCUTANEOUS at 10:16

## 2024-01-16 RX ADMIN — LANSOPRAZOLE 30 MG: 30 TABLET, ORALLY DISINTEGRATING, DELAYED RELEASE ORAL at 16:33

## 2024-01-16 RX ADMIN — LANSOPRAZOLE 30 MG: 30 TABLET, ORALLY DISINTEGRATING, DELAYED RELEASE ORAL at 06:12

## 2024-01-16 RX ADMIN — HYDROMORPHONE HYDROCHLORIDE 0.5 MG: 1 INJECTION, SOLUTION INTRAMUSCULAR; INTRAVENOUS; SUBCUTANEOUS at 21:21

## 2024-01-16 RX ADMIN — METOCLOPRAMIDE 5 MG: 5 INJECTION, SOLUTION INTRAMUSCULAR; INTRAVENOUS at 06:15

## 2024-01-16 RX ADMIN — MIDODRINE HYDROCHLORIDE 10 MG: 5 TABLET ORAL at 08:48

## 2024-01-16 RX ADMIN — METOCLOPRAMIDE 5 MG: 5 INJECTION, SOLUTION INTRAMUSCULAR; INTRAVENOUS at 13:12

## 2024-01-16 RX ADMIN — HYDROMORPHONE HYDROCHLORIDE 0.5 MG: 1 INJECTION, SOLUTION INTRAMUSCULAR; INTRAVENOUS; SUBCUTANEOUS at 06:13

## 2024-01-16 RX ADMIN — HYDROMORPHONE HYDROCHLORIDE 0.5 MG: 1 INJECTION, SOLUTION INTRAMUSCULAR; INTRAVENOUS; SUBCUTANEOUS at 19:14

## 2024-01-16 RX ADMIN — OXYCODONE HYDROCHLORIDE 10 MG: 5 TABLET ORAL at 00:02

## 2024-01-16 RX ADMIN — SENNOSIDES 8.8 MG: 8.8 LIQUID ORAL at 20:04

## 2024-01-16 RX ADMIN — MEROPENEM 1000 MG: 1 INJECTION, POWDER, FOR SOLUTION INTRAVENOUS at 00:04

## 2024-01-16 RX ADMIN — OXYCODONE HYDROCHLORIDE 10 MG: 5 TABLET ORAL at 16:33

## 2024-01-16 RX ADMIN — MIDODRINE HYDROCHLORIDE 10 MG: 5 TABLET ORAL at 13:11

## 2024-01-16 RX ADMIN — INSULIN LISPRO 4 UNITS: 100 INJECTION, SOLUTION INTRAVENOUS; SUBCUTANEOUS at 13:17

## 2024-01-16 RX ADMIN — ACETAMINOPHEN 1000 MG: 650 SOLUTION ORAL at 06:12

## 2024-01-16 RX ADMIN — METOCLOPRAMIDE 5 MG: 5 INJECTION, SOLUTION INTRAMUSCULAR; INTRAVENOUS at 00:03

## 2024-01-16 RX ADMIN — MEROPENEM 1000 MG: 1 INJECTION, POWDER, FOR SOLUTION INTRAVENOUS at 15:45

## 2024-01-16 RX ADMIN — OXYCODONE HYDROCHLORIDE 10 MG: 5 TABLET ORAL at 13:11

## 2024-01-16 ASSESSMENT — PULMONARY FUNCTION TESTS
PIF_VALUE: 24
PIF_VALUE: 26
PIF_VALUE: 36
PIF_VALUE: 23
PIF_VALUE: 23
PIF_VALUE: 29
PIF_VALUE: 20
PIF_VALUE: 26
PIF_VALUE: 25
PIF_VALUE: 25
PIF_VALUE: 15
PIF_VALUE: 31
PIF_VALUE: 24
PIF_VALUE: 23
PIF_VALUE: 24
PIF_VALUE: 32
PIF_VALUE: 9
PIF_VALUE: 26
PIF_VALUE: 27
PIF_VALUE: 23
PIF_VALUE: 25
PIF_VALUE: 22
PIF_VALUE: 22
PIF_VALUE: 29
PIF_VALUE: 36
PIF_VALUE: 20
PIF_VALUE: 23
PIF_VALUE: 22
PIF_VALUE: 19
PIF_VALUE: 23
PIF_VALUE: 27
PIF_VALUE: 23
PIF_VALUE: 18
PIF_VALUE: 24
PIF_VALUE: 23
PIF_VALUE: 13
PIF_VALUE: 24
PIF_VALUE: 23
PIF_VALUE: 27
PIF_VALUE: 24
PIF_VALUE: 22
PIF_VALUE: 22
PIF_VALUE: 20
PIF_VALUE: 19
PIF_VALUE: 25
PIF_VALUE: 23
PIF_VALUE: 25
PIF_VALUE: 23
PIF_VALUE: 14
PIF_VALUE: 23
PIF_VALUE: 24
PIF_VALUE: 24
PIF_VALUE: 23
PIF_VALUE: 23
PIF_VALUE: 21
PIF_VALUE: 24
PIF_VALUE: 23
PIF_VALUE: 24
PIF_VALUE: 23
PIF_VALUE: 24
PIF_VALUE: 20
PIF_VALUE: 25
PIF_VALUE: 22
PIF_VALUE: 26
PIF_VALUE: 21
PIF_VALUE: 23
PIF_VALUE: 25
PIF_VALUE: 32
PIF_VALUE: 24
PIF_VALUE: 23
PIF_VALUE: 24

## 2024-01-16 ASSESSMENT — PAIN SCALES - GENERAL: PAINLEVEL_OUTOF10: 0

## 2024-01-16 NOTE — PROGRESS NOTES
BUN 38* 36*  --  32*   CREATININE 1.0 1.1  --  1.0   GLUCOSE 173* 149*  --  166*           RADIOLOGY:  XR CHEST PORTABLE    Result Date: 1/17/2024  Small right pleural effusion with right basilar airspace disease.  This could represent atelectasis and/or pneumonia.     CT CHEST ABDOMEN PELVIS W CONTRAST Additional Contrast? None    Result Date: 1/16/2024  Mild right-sided pleural effusion with adjacent compressive atelectasis. Similar potential breast mass in the lower outer quadrant of the left breast. Nodular appearance of the hepatic border consistent with a degree of cirrhosis. Splenomegaly. Dense material within the gallbladder. Trace free fluid in the pericolic gutters. Soft tissue drain in the right lower quadrant without evidence for abscess. Overall the examination is similar compared to recent prior exam.     XR CHEST PORTABLE    Result Date: 1/16/2024  1. No significant interval change. 2. Persistent right basilar opacity. 3. Gas-filled stomach.          Cat Yoo DO  1/16/2024, 6:57 AM

## 2024-01-16 NOTE — PLAN OF CARE
Problem: Discharge Planning  Goal: Discharge to home or other facility with appropriate resources  1/16/2024 0937 by Don Hammond RN  Outcome: Progressing  Flowsheets (Taken 1/16/2024 0800)  Discharge to home or other facility with appropriate resources: Identify barriers to discharge with patient and caregiver  1/16/2024 0028 by Karla Lu RN  Outcome: Progressing     Problem: Pain  Goal: Verbalizes/displays adequate comfort level or baseline comfort level  1/16/2024 0937 by Don Hammond RN  Outcome: Progressing  1/16/2024 0028 by Karla Lu RN  Outcome: Progressing  Flowsheets (Taken 1/15/2024 1600 by Ehsan, Elizabeth, RN)  Verbalizes/displays adequate comfort level or baseline comfort level:   Encourage patient to monitor pain and request assistance   Assess pain using appropriate pain scale     Problem: Safety - Adult  Goal: Free from fall injury  1/16/2024 0937 by Don Hammond RN  Outcome: Progressing  1/16/2024 0028 by Karla Lu RN  Outcome: Progressing     Problem: Respiratory - Adult  Goal: Achieves optimal ventilation and oxygenation  1/16/2024 0937 by Don Hammond RN  Outcome: Progressing  Flowsheets (Taken 1/16/2024 0800)  Achieves optimal ventilation and oxygenation:   Assess for changes in respiratory status   Assess for changes in mentation and behavior  1/16/2024 0028 by Karla Lu RN  Outcome: Progressing  1/15/2024 2127 by Dilan Erazo RCP  Outcome: Progressing  Flowsheets (Taken 1/15/2024 0800 by Elizabeth Moser RN)  Achieves optimal ventilation and oxygenation:   Assess for changes in respiratory status   Assess for changes in mentation and behavior     Problem: Chronic Conditions and Co-morbidities  Goal: Patient's chronic conditions and co-morbidity symptoms are monitored and maintained or improved  1/16/2024 0937 by Don Hammond RN  Outcome: Progressing  Flowsheets (Taken 1/16/2024

## 2024-01-16 NOTE — PROGRESS NOTES
[facility] -   Infectious diseases evaluation  admission date 1/1/2024    reason for consultation:   Necrotizing Faciitis    Impression :   Current:  1/1/24 Necrotizing faciitis 2/2 Polymicrobial infection with T2DM and PEG tube dislodgement and infected   LEONARDA on CKD   1/2/24 S/p partial wedge gastrectomy due to infected PEG tube and dislodgement   1/2/24 S/p debridement of abdomen and indwelling mesh removal,  due to concern for necrotizing fasciitis, wound vac in place  Cx abd wall 1/5 kleb x 2 and enterococcus and saccharomyces   1/3/24 GASTROSTOMY TUBE PLACEMENT, REPAIR OF LARGE VENTRAL DEFECT   Alcoholic Liver cirrhosis - found intra op  Elevated CRP  Lactic acidosis- initial 4.1 now 6.2  Bandemia leukemoid reaction- WBC 15 to 38 to 27 -45  Proteus UTI - treated  RLL pseudomonas pneumonia 1/1 - treated  Iv phlebitis -iv removed   1/11 R lung collapse - sp again pseudomonas    Other:    Discussion / summary of stay / plan of care   Respiratory culture: pseudomonas multiS 1/1/24 -treated  U cx proteus multiS - treated  Bcx pending - SCN x 1  is a contamination  MRSA swab and COVID pending   cx of the abd wall  of 1/5/24 enterococcus and kleb and saccharomyces  Leukemoid reaction better 16 --27 fluctuating  Zyvox stopped 1/6 due to thrombocytopenia  Avoid fluconazole due to amiodarone  1/7 CT abd suggesting bilat LL atelectasis rather than  pneumonia   micafungin  1/9/24 - stop 1/13  Given due to persistent bandemia,  cover the fungus  1/11 R arm iv phlebitis - added doxy- stop 1/14  Recommendations   Treating large abd wound infection w kleb enterococcus and saccharomyces  Bandemia persistent but ultimately improved  Iv phlebitis pulled and resolved  R lung collapse 1/11 w mucous plug, sp GNR 1/15   Post Abd wound prep  1/8 -1/13 - 1/15  meropenem 1/1 -1/13 -   1/13 WBC 27 -   Abd prep w mild duskiness of the wound - disc w Dr Monte  CTAP - shows R lung collapse w  with bone bx performed by Porfirio Hernandez DPM at [facility] OR    TRACHEOSTOMY N/A 11/8/2023    TRACHEOTOMY performed by Mary Carmen Monte MD at [facility] OR    UPPER GASTROINTESTINAL ENDOSCOPY  07/19/2017    polypectomy    UPPER GASTROINTESTINAL ENDOSCOPY N/A 03/14/2019    EGD BIOPSY performed by Juan Arriaga MD at [facility] Endoscopy    UPPER GASTROINTESTINAL ENDOSCOPY N/A 04/18/2023    EGD ESOPHAGOGASTRODUODENOSCOPY performed by Dorian Rendon MD at [facility] OR    UPPER GASTROINTESTINAL ENDOSCOPY N/A 11/8/2023    EGD ESOPHAGOGASTRODUODENOSCOPY PEG TUBE INSERTION performed by Mary Carmen Monte MD at [facility] OR    VENTRAL HERNIA REPAIR N/A 05/20/2018    REPAIR AND REDUCTION OF RECURRENT INCARCERATED INCISIONAL HERNIA WITH MESH performed by Bijan Camejo MD at [facility] OR       Medications:      sucralfate  1 g Oral Once    scopolamine  1 patch TransDERmal Q72H    midodrine  10 mg Oral TID WC    metoclopramide  5 mg IntraVENous Q6H    [Held by provider] insulin glargine  30 Units SubCUTAneous BID    insulin lispro  0-16 Units SubCUTAneous Q4H    amiodarone  200 mg Oral Daily    heparin (porcine)  7,500 Units SubCUTAneous 3 times per day    oxyCODONE  10 mg Oral Q4H    lansoprazole  30 mg Oral BID AC    acetaminophen  1,000 mg Oral 3 times per day    docusate  100 mg Oral BID    senna  5 mL Oral BID    aspirin  81 mg Per NG tube Daily    levothyroxine  200 mcg Per G Tube Daily       Social History:     Social History     Socioeconomic History    Marital status:      Spouse name: Not on file    Number of children: Not on file    Years of education: Not on file    Highest education level: Not on file   Occupational History    Not on file   Tobacco Use    Smoking status: Never    Smokeless tobacco: Never   Vaping Use    Vaping Use: Never used   Substance and Sexual Activity    Alcohol use: No     Comment: quit drinking 2012    Drug use: No    Sexual activity: Not on file   Other Topics Concern    Not on file

## 2024-01-16 NOTE — PROGRESS NOTES
Comprehensive Nutrition Assessment    Type and Reason for Visit:  Reassess    Nutrition Recommendations/Plan:   Continue Immune Enhancing TF at 50 mL/hr.  Monitor TF tolerance/adequacy of intakes.  As able, suggest increasing goal rate to 60 mL/hr to better meet estimated nutrition needs.  Will monitor labs, weights, and plan of care.     Malnutrition Assessment:  Malnutrition Status:  Insufficient data (01/03/24 1416)    Context:  Acute Illness       Nutrition Assessment:    Pt remains intubated via trach.  Immune Enhancing TF restarted and running at 50 mL/hr - tolerating without nausea/vomiting per RN.  Last BM 1/14.  S/p wound debridement and vac change yesterday.  Plans for return to OR for abdominal wound re-exploration, wound bed preparation, wound vac placement.  Weights reviewed - fluctuations noted.  Labs include: K 3.4 mmol/L, Phos 6.0 mg/dL, Glucose 166-174 mg/dL, BUN 32 mg/dL.  Meds include: Colace, Reglan, Senokot.    Nutrition Related Findings:    labs/meds reviewed.  +2 facial edema.  last BM 1/14.   Wound Type: Surgical Incision, Wound Vac (to abdomen)       Current Nutrition Intake & Therapies:    Average Meal Intake: NPO  Average Supplements Intake: NPO  Diet NPO Exceptions are: Sips of Water with Meds  ADULT TUBE FEEDING; Nasogastric; Immune Enhancing; Continuous; 50; No; 30; Q 4 hours  Current Tube Feeding (TF) Orders:  Feeding Route: PEG  Formula: Immune Enhancing  Schedule: Continuous  Feeding Regimen: 50 mL/hr  Additives/Modulars: None  Water Flushes: 30 mL q 4 hrs  Current TF & Flush Orders Provides: At 50 mL/hr = 1800 kcals, 113 gm protein  Goal TF & Flush Orders Provides: Immune Enhancing goal 50 mL/hr = 1800 kcals, 113 gm protein.  Suggest eventual goal rate 60 mL/hr = 2160 kcals, 135 gm protein.  Additional Calorie Sources:  None    Anthropometric Measures:  Height: 177.8 cm (5' 10\")  Ideal Body Weight (IBW): 166 lbs (75 kg)    Admission Body Weight: 156.9 kg (345 lb 14.4 oz)  Current

## 2024-01-16 NOTE — PLAN OF CARE
Problem: Discharge Planning  Goal: Discharge to home or other facility with appropriate resources  1/16/2024 0028 by Karla Lu RN  Outcome: Progressing  1/15/2024 1449 by Elizabeth Moser RN  Outcome: Progressing  Flowsheets (Taken 1/15/2024 0800)  Discharge to home or other facility with appropriate resources: Identify barriers to discharge with patient and caregiver     Problem: Pain  Goal: Verbalizes/displays adequate comfort level or baseline comfort level  1/16/2024 0028 by Karla Lu RN  Outcome: Progressing  Flowsheets (Taken 1/15/2024 1600 by Clovis, Elizabeth, RN)  Verbalizes/displays adequate comfort level or baseline comfort level:   Encourage patient to monitor pain and request assistance   Assess pain using appropriate pain scale  1/15/2024 1449 by Elizabeth Moser RN  Outcome: Progressing  Flowsheets (Taken 1/15/2024 0800)  Verbalizes/displays adequate comfort level or baseline comfort level:   Encourage patient to monitor pain and request assistance   Assess pain using appropriate pain scale     Problem: Safety - Adult  Goal: Free from fall injury  1/16/2024 0028 by Karla Lu RN  Outcome: Progressing  1/15/2024 1449 by Elizabeth Moser RN  Outcome: Progressing     Problem: Respiratory - Adult  Goal: Achieves optimal ventilation and oxygenation  1/16/2024 0028 by Karla Lu RN  Outcome: Progressing  1/15/2024 2127 by Dilan Erazo RCP  Outcome: Progressing  Flowsheets (Taken 1/15/2024 0800 by Elizabeth Moser RN)  Achieves optimal ventilation and oxygenation:   Assess for changes in respiratory status   Assess for changes in mentation and behavior  1/15/2024 1449 by Elizabeth Moser RN  Outcome: Progressing     Problem: Chronic Conditions and Co-morbidities  Goal: Patient's chronic conditions and co-morbidity symptoms are monitored and maintained or improved  1/16/2024 0028 by Karla Lu RN  Outcome: Progressing  1/15/2024 1449 by

## 2024-01-16 NOTE — PROGRESS NOTES
01/16/24 1547   Care Plan - Respiratory Goals   Achieves optimal ventilation and oxygenation Assess for changes in respiratory status;Assess for changes in mentation and behavior;Position to facilitate oxygenation and minimize respiratory effort;Oxygen supplementation based on oxygen saturation or arterial blood gases;Encourage broncho-pulmonary hygiene including cough, deep breathe, incentive spirometry;Assess the need for suctioning and aspirate as needed;Assess and instruct to report shortness of breath or any respiratory difficulty;Respiratory therapy support as indicated

## 2024-01-16 NOTE — PROGRESS NOTES
01/16/24 1304   Patient Transport   Time Spent Transporting 31-45   Transport Ventillation Type Transport vent   Transport From ICU   Transport Destination CT scan   Emergency Equipment Included Yes         The transport originated from [facility], room 1014. Pt. was transported to CT-scan. Assisting with the transport was RN.  Appropriate devices were applied to monitor the patient's condition during transport. Patient transported  via 40% O2 via ventilator. Patient tolerated well.        MATTHEW MEYER RCP  3:17 PM

## 2024-01-16 NOTE — PLAN OF CARE
Problem: Respiratory - Adult  Goal: Achieves optimal ventilation and oxygenation  1/15/2024 2127 by Dilan Erazo RCP  Outcome: Progressing  Flowsheets (Taken 1/15/2024 0800 by Elizabeth Moser RN)  Achieves optimal ventilation and oxygenation:   Assess for changes in respiratory status   Assess for changes in mentation and behavior

## 2024-01-17 ENCOUNTER — ANESTHESIA EVENT (OUTPATIENT)
Dept: OPERATING ROOM | Age: 59
End: 2024-01-17
Payer: MEDICARE

## 2024-01-17 ENCOUNTER — APPOINTMENT (OUTPATIENT)
Dept: GENERAL RADIOLOGY | Age: 59
DRG: 853 | End: 2024-01-17
Payer: MEDICARE

## 2024-01-17 LAB
ALBUMIN SERPL-MCNC: 1.9 G/DL (ref 3.5–5.2)
ALBUMIN/GLOB SERPL: 0.6 {RATIO} (ref 1–2.5)
ALP SERPL-CCNC: 324 U/L (ref 40–129)
ALT SERPL-CCNC: 12 U/L (ref 5–41)
ANION GAP SERPL CALCULATED.3IONS-SCNC: 11 MMOL/L (ref 9–17)
AST SERPL-CCNC: 37 U/L
BASOPHILS # BLD: 0.07 K/UL (ref 0–0.2)
BASOPHILS NFR BLD: 1 % (ref 0–2)
BILIRUB DIRECT SERPL-MCNC: 0.7 MG/DL
BILIRUB INDIRECT SERPL-MCNC: 0.2 MG/DL (ref 0–1)
BILIRUB SERPL-MCNC: 0.9 MG/DL (ref 0.3–1.2)
BUN SERPL-MCNC: 29 MG/DL (ref 6–20)
CA-I BLD-SCNC: 1.25 MMOL/L (ref 1.13–1.33)
CALCIUM SERPL-MCNC: 7.8 MG/DL (ref 8.6–10.4)
CHLORIDE SERPL-SCNC: 112 MMOL/L (ref 98–107)
CO2 SERPL-SCNC: 16 MMOL/L (ref 20–31)
CREAT SERPL-MCNC: 0.9 MG/DL (ref 0.7–1.2)
CRP SERPL HS-MCNC: 183.3 MG/L (ref 0–5)
EOSINOPHIL # BLD: 1.26 K/UL (ref 0–0.44)
EOSINOPHILS RELATIVE PERCENT: 11 % (ref 1–4)
ERYTHROCYTE [DISTWIDTH] IN BLOOD BY AUTOMATED COUNT: 18.2 % (ref 11.8–14.4)
FIO2: 40
GFR SERPL CREATININE-BSD FRML MDRD: >60 ML/MIN/1.73M2
GLUCOSE BLD-MCNC: 127 MG/DL (ref 74–100)
GLUCOSE BLD-MCNC: 143 MG/DL (ref 75–110)
GLUCOSE BLD-MCNC: 148 MG/DL (ref 75–110)
GLUCOSE BLD-MCNC: 167 MG/DL (ref 75–110)
GLUCOSE BLD-MCNC: 168 MG/DL (ref 75–110)
GLUCOSE BLD-MCNC: 176 MG/DL (ref 75–110)
GLUCOSE SERPL-MCNC: 158 MG/DL (ref 70–99)
HCT VFR BLD AUTO: 24.6 % (ref 40.7–50.3)
HGB BLD-MCNC: 7.8 G/DL (ref 13–17)
IMM GRANULOCYTES # BLD AUTO: 0.17 K/UL (ref 0–0.3)
IMM GRANULOCYTES NFR BLD: 1 %
LYMPHOCYTES NFR BLD: 1.21 K/UL (ref 1.1–3.7)
LYMPHOCYTES RELATIVE PERCENT: 10 % (ref 24–43)
MAGNESIUM SERPL-MCNC: 1.9 MG/DL (ref 1.6–2.6)
MCH RBC QN AUTO: 29 PG (ref 25.2–33.5)
MCHC RBC AUTO-ENTMCNC: 31.7 G/DL (ref 28.4–34.8)
MCV RBC AUTO: 91.4 FL (ref 82.6–102.9)
MONOCYTES NFR BLD: 0.72 K/UL (ref 0.1–1.2)
MONOCYTES NFR BLD: 6 % (ref 3–12)
NEGATIVE BASE EXCESS, ART: 9.4 MMOL/L (ref 0–2)
NEUTROPHILS NFR BLD: 71 % (ref 36–65)
NEUTS SEG NFR BLD: 8.5 K/UL (ref 1.5–8.1)
NRBC BLD-RTO: 0 PER 100 WBC
PHOSPHATE SERPL-MCNC: 4.4 MG/DL (ref 2.5–4.5)
PLATELET # BLD AUTO: 156 K/UL (ref 138–453)
PMV BLD AUTO: 9.6 FL (ref 8.1–13.5)
POC HCO3: 16 MMOL/L (ref 21–28)
POC LACTIC ACID: 0.6 MMOL/L (ref 0.56–1.39)
POC O2 SATURATION: 99.5 % (ref 94–98)
POC PCO2: 31.9 MM HG (ref 35–48)
POC PH: 7.31 (ref 7.35–7.45)
POC PO2: 183 MM HG (ref 83–108)
POTASSIUM SERPL-SCNC: 3.5 MMOL/L (ref 3.7–5.3)
PROT SERPL-MCNC: 4.9 G/DL (ref 6.4–8.3)
RBC # BLD AUTO: 2.69 M/UL (ref 4.21–5.77)
RBC # BLD: ABNORMAL 10*6/UL
SAMPLE SITE: ABNORMAL
SODIUM SERPL-SCNC: 139 MMOL/L (ref 135–144)
WBC OTHER # BLD: 11.9 K/UL (ref 3.5–11.3)

## 2024-01-17 PROCEDURE — 82803 BLOOD GASES ANY COMBINATION: CPT

## 2024-01-17 PROCEDURE — 6360000002 HC RX W HCPCS: Performed by: NURSE PRACTITIONER

## 2024-01-17 PROCEDURE — 2000000000 HC ICU R&B

## 2024-01-17 PROCEDURE — 99233 SBSQ HOSP IP/OBS HIGH 50: CPT | Performed by: INTERNAL MEDICINE

## 2024-01-17 PROCEDURE — 83605 ASSAY OF LACTIC ACID: CPT

## 2024-01-17 PROCEDURE — 6370000000 HC RX 637 (ALT 250 FOR IP)

## 2024-01-17 PROCEDURE — 6360000002 HC RX W HCPCS

## 2024-01-17 PROCEDURE — 2700000000 HC OXYGEN THERAPY PER DAY

## 2024-01-17 PROCEDURE — 6360000002 HC RX W HCPCS: Performed by: INTERNAL MEDICINE

## 2024-01-17 PROCEDURE — 85025 COMPLETE CBC W/AUTO DIFF WBC: CPT

## 2024-01-17 PROCEDURE — 94003 VENT MGMT INPAT SUBQ DAY: CPT

## 2024-01-17 PROCEDURE — 82330 ASSAY OF CALCIUM: CPT

## 2024-01-17 PROCEDURE — 86140 C-REACTIVE PROTEIN: CPT

## 2024-01-17 PROCEDURE — 37799 UNLISTED PX VASCULAR SURGERY: CPT

## 2024-01-17 PROCEDURE — 80048 BASIC METABOLIC PNL TOTAL CA: CPT

## 2024-01-17 PROCEDURE — 71045 X-RAY EXAM CHEST 1 VIEW: CPT

## 2024-01-17 PROCEDURE — 6370000000 HC RX 637 (ALT 250 FOR IP): Performed by: NURSE PRACTITIONER

## 2024-01-17 PROCEDURE — 83735 ASSAY OF MAGNESIUM: CPT

## 2024-01-17 PROCEDURE — 2500000003 HC RX 250 WO HCPCS: Performed by: NURSE PRACTITIONER

## 2024-01-17 PROCEDURE — 99232 SBSQ HOSP IP/OBS MODERATE 35: CPT | Performed by: SURGERY

## 2024-01-17 PROCEDURE — 94761 N-INVAS EAR/PLS OXIMETRY MLT: CPT

## 2024-01-17 PROCEDURE — 82947 ASSAY GLUCOSE BLOOD QUANT: CPT

## 2024-01-17 PROCEDURE — 2580000003 HC RX 258: Performed by: INTERNAL MEDICINE

## 2024-01-17 PROCEDURE — 80076 HEPATIC FUNCTION PANEL: CPT

## 2024-01-17 PROCEDURE — 84100 ASSAY OF PHOSPHORUS: CPT

## 2024-01-17 RX ORDER — FUROSEMIDE 10 MG/ML
20 INJECTION INTRAMUSCULAR; INTRAVENOUS ONCE
Status: COMPLETED | OUTPATIENT
Start: 2024-01-17 | End: 2024-01-17

## 2024-01-17 RX ORDER — MIDODRINE HYDROCHLORIDE 5 MG/1
15 TABLET ORAL
Status: DISCONTINUED | OUTPATIENT
Start: 2024-01-17 | End: 2024-01-27

## 2024-01-17 RX ORDER — NOREPINEPHRINE BITARTRATE 0.06 MG/ML
1-100 INJECTION, SOLUTION INTRAVENOUS CONTINUOUS
Status: DISCONTINUED | OUTPATIENT
Start: 2024-01-17 | End: 2024-01-18

## 2024-01-17 RX ORDER — MAGNESIUM SULFATE IN WATER 40 MG/ML
2000 INJECTION, SOLUTION INTRAVENOUS ONCE
Status: COMPLETED | OUTPATIENT
Start: 2024-01-17 | End: 2024-01-17

## 2024-01-17 RX ADMIN — METOCLOPRAMIDE 5 MG: 5 INJECTION, SOLUTION INTRAMUSCULAR; INTRAVENOUS at 06:24

## 2024-01-17 RX ADMIN — OXYCODONE HYDROCHLORIDE 10 MG: 5 TABLET ORAL at 04:07

## 2024-01-17 RX ADMIN — ASPIRIN 81 MG 81 MG: 81 TABLET ORAL at 08:32

## 2024-01-17 RX ADMIN — HYDROMORPHONE HYDROCHLORIDE 0.5 MG: 1 INJECTION, SOLUTION INTRAMUSCULAR; INTRAVENOUS; SUBCUTANEOUS at 20:59

## 2024-01-17 RX ADMIN — DOCUSATE SODIUM LIQUID 100 MG: 100 LIQUID ORAL at 08:32

## 2024-01-17 RX ADMIN — HEPARIN SODIUM 7500 UNITS: 5000 INJECTION INTRAVENOUS; SUBCUTANEOUS at 15:17

## 2024-01-17 RX ADMIN — LANSOPRAZOLE 30 MG: 30 TABLET, ORALLY DISINTEGRATING, DELAYED RELEASE ORAL at 15:17

## 2024-01-17 RX ADMIN — POTASSIUM BICARBONATE 40 MEQ: 782 TABLET, EFFERVESCENT ORAL at 12:06

## 2024-01-17 RX ADMIN — HEPARIN SODIUM 7500 UNITS: 5000 INJECTION INTRAVENOUS; SUBCUTANEOUS at 21:55

## 2024-01-17 RX ADMIN — METOCLOPRAMIDE 5 MG: 5 INJECTION, SOLUTION INTRAMUSCULAR; INTRAVENOUS at 18:49

## 2024-01-17 RX ADMIN — LANSOPRAZOLE 30 MG: 30 TABLET, ORALLY DISINTEGRATING, DELAYED RELEASE ORAL at 06:24

## 2024-01-17 RX ADMIN — METOCLOPRAMIDE 5 MG: 5 INJECTION, SOLUTION INTRAMUSCULAR; INTRAVENOUS at 12:06

## 2024-01-17 RX ADMIN — Medication 1 MCG/MIN: at 15:26

## 2024-01-17 RX ADMIN — ACETAMINOPHEN 1000 MG: 650 SOLUTION ORAL at 06:24

## 2024-01-17 RX ADMIN — OXYCODONE HYDROCHLORIDE 10 MG: 5 TABLET ORAL at 20:06

## 2024-01-17 RX ADMIN — SENNOSIDES 8.8 MG: 8.8 LIQUID ORAL at 08:32

## 2024-01-17 RX ADMIN — LEVOTHYROXINE SODIUM 200 MCG: 100 TABLET ORAL at 06:24

## 2024-01-17 RX ADMIN — OXYCODONE HYDROCHLORIDE 10 MG: 5 TABLET ORAL at 23:33

## 2024-01-17 RX ADMIN — METOCLOPRAMIDE 5 MG: 5 INJECTION, SOLUTION INTRAMUSCULAR; INTRAVENOUS at 01:01

## 2024-01-17 RX ADMIN — MEROPENEM 1000 MG: 1 INJECTION, POWDER, FOR SOLUTION INTRAVENOUS at 23:36

## 2024-01-17 RX ADMIN — OXYCODONE HYDROCHLORIDE 10 MG: 5 TABLET ORAL at 17:07

## 2024-01-17 RX ADMIN — MEROPENEM 1000 MG: 1 INJECTION, POWDER, FOR SOLUTION INTRAVENOUS at 15:19

## 2024-01-17 RX ADMIN — MAGNESIUM SULFATE HEPTAHYDRATE 2000 MG: 40 INJECTION, SOLUTION INTRAVENOUS at 12:24

## 2024-01-17 RX ADMIN — HYDROMORPHONE HYDROCHLORIDE 0.5 MG: 1 INJECTION, SOLUTION INTRAMUSCULAR; INTRAVENOUS; SUBCUTANEOUS at 12:06

## 2024-01-17 RX ADMIN — MEROPENEM 1000 MG: 1 INJECTION, POWDER, FOR SOLUTION INTRAVENOUS at 07:32

## 2024-01-17 RX ADMIN — OXYCODONE HYDROCHLORIDE 10 MG: 5 TABLET ORAL at 08:32

## 2024-01-17 RX ADMIN — AMIODARONE HYDROCHLORIDE 200 MG: 200 TABLET ORAL at 08:32

## 2024-01-17 RX ADMIN — HYDROMORPHONE HYDROCHLORIDE 0.5 MG: 1 INJECTION, SOLUTION INTRAMUSCULAR; INTRAVENOUS; SUBCUTANEOUS at 03:04

## 2024-01-17 RX ADMIN — MIDODRINE HYDROCHLORIDE 15 MG: 5 TABLET ORAL at 17:07

## 2024-01-17 RX ADMIN — OXYCODONE HYDROCHLORIDE 10 MG: 5 TABLET ORAL at 12:06

## 2024-01-17 RX ADMIN — MIDODRINE HYDROCHLORIDE 15 MG: 5 TABLET ORAL at 12:06

## 2024-01-17 RX ADMIN — ACETAMINOPHEN 1000 MG: 650 SOLUTION ORAL at 15:17

## 2024-01-17 RX ADMIN — ACETAMINOPHEN 1000 MG: 650 SOLUTION ORAL at 21:57

## 2024-01-17 RX ADMIN — OXYCODONE HYDROCHLORIDE 10 MG: 5 TABLET ORAL at 01:01

## 2024-01-17 RX ADMIN — HYDROMORPHONE HYDROCHLORIDE 0.5 MG: 1 INJECTION, SOLUTION INTRAMUSCULAR; INTRAVENOUS; SUBCUTANEOUS at 01:04

## 2024-01-17 RX ADMIN — MIDODRINE HYDROCHLORIDE 10 MG: 5 TABLET ORAL at 08:32

## 2024-01-17 RX ADMIN — HEPARIN SODIUM 7500 UNITS: 5000 INJECTION INTRAVENOUS; SUBCUTANEOUS at 06:24

## 2024-01-17 RX ADMIN — FUROSEMIDE 20 MG: 10 INJECTION, SOLUTION INTRAMUSCULAR; INTRAVENOUS at 12:06

## 2024-01-17 RX ADMIN — HYDROMORPHONE HYDROCHLORIDE 0.5 MG: 1 INJECTION, SOLUTION INTRAMUSCULAR; INTRAVENOUS; SUBCUTANEOUS at 05:48

## 2024-01-17 ASSESSMENT — PAIN DESCRIPTION - ONSET
ONSET: ON-GOING
ONSET: ON-GOING

## 2024-01-17 ASSESSMENT — PULMONARY FUNCTION TESTS
PIF_VALUE: 16
PIF_VALUE: 24
PIF_VALUE: 21
PIF_VALUE: 25
PIF_VALUE: 19
PIF_VALUE: 22
PIF_VALUE: 20
PIF_VALUE: 20
PIF_VALUE: 16
PIF_VALUE: 21
PIF_VALUE: 23
PIF_VALUE: 20
PIF_VALUE: 18
PIF_VALUE: 20
PIF_VALUE: 24
PIF_VALUE: 23
PIF_VALUE: 23
PIF_VALUE: 21
PIF_VALUE: 16
PIF_VALUE: 19
PIF_VALUE: 21
PIF_VALUE: 23
PIF_VALUE: 21
PIF_VALUE: 20
PIF_VALUE: 17
PIF_VALUE: 14
PIF_VALUE: 16
PIF_VALUE: 12
PIF_VALUE: 22
PIF_VALUE: 23
PIF_VALUE: 18
PIF_VALUE: 16
PIF_VALUE: 22
PIF_VALUE: 16
PIF_VALUE: 16

## 2024-01-17 ASSESSMENT — PAIN DESCRIPTION - LOCATION
LOCATION: ABDOMEN

## 2024-01-17 ASSESSMENT — PAIN DESCRIPTION - PAIN TYPE
TYPE: SURGICAL PAIN
TYPE: SURGICAL PAIN

## 2024-01-17 ASSESSMENT — PAIN SCALES - GENERAL
PAINLEVEL_OUTOF10: 8
PAINLEVEL_OUTOF10: 6
PAINLEVEL_OUTOF10: 7
PAINLEVEL_OUTOF10: 7
PAINLEVEL_OUTOF10: 0
PAINLEVEL_OUTOF10: 5
PAINLEVEL_OUTOF10: 6

## 2024-01-17 ASSESSMENT — PAIN - FUNCTIONAL ASSESSMENT
PAIN_FUNCTIONAL_ASSESSMENT: PREVENTS OR INTERFERES WITH MANY ACTIVE NOT PASSIVE ACTIVITIES
PAIN_FUNCTIONAL_ASSESSMENT: PREVENTS OR INTERFERES WITH MANY ACTIVE NOT PASSIVE ACTIVITIES

## 2024-01-17 ASSESSMENT — PAIN DESCRIPTION - DESCRIPTORS
DESCRIPTORS: ACHING;DISCOMFORT
DESCRIPTORS: ACHING;DISCOMFORT

## 2024-01-17 ASSESSMENT — PAIN DESCRIPTION - ORIENTATION
ORIENTATION: OUTER
ORIENTATION: OUTER

## 2024-01-17 ASSESSMENT — PAIN SCALES - WONG BAKER: WONGBAKER_NUMERICALRESPONSE: 0

## 2024-01-17 ASSESSMENT — PAIN DESCRIPTION - FREQUENCY
FREQUENCY: CONTINUOUS
FREQUENCY: CONTINUOUS

## 2024-01-17 ASSESSMENT — ENCOUNTER SYMPTOMS: STRIDOR: 0

## 2024-01-17 NOTE — PROGRESS NOTES
PALLIATIVE CARE NURSING ASSESSMENT    Patient: Ruben Dimas  Room: 1014/1014-01    Reason For Consult   Goals of care evaluation  Distress management  Guidance and support  Facilitate communications  Assistance in coordinating care    Code Status: Full Code    Summary:  Palliative care visit to bedside for support.  Pt is alert and able to communicate mouthing words and waves to welcome writer.   Patients decision maker if needed is his 4 siblings working together and majority would rule.    Palliative care will continue to follow for support and goals of care.       Impression: Ruben Dimas is a 58 y.o. year old male  has a past medical history of A-fib (HCC), Anxiety and depression, Back pain, chronic, BPH (benign prostatic hyperplasia), Cellulitis of left lower extremity, Cellulitis of right lower extremity, CHF (congestive heart failure) ([facility]), Chronic respiratory failure with hypoxia (HCC), Cirrhosis (HCC), Diabetes mellitus (HCC), Epididymoorchitis, GERD (gastroesophageal reflux disease), H/O cardiac catheterization, Hepatitis, Hiatal hernia, History of alcohol abuse, Hyperlipidemia, Hypertension, IBS (irritable bowel syndrome), Incisional hernia with obstruction but no gangrene, Klebsiella sepsis ([facility]), Metabolic encephalopathy, Morbid obesity ([facility]), Neuropathy, On home oxygen therapy, On home oxygen therapy, Pancreatitis, Poisoning by insulin and oral hypoglycemic (antidiabetic) drugs, accidental (unintentional), initial encounter, Primary osteoarthritis of right knee, Retention, urine, SBO (small bowel obstruction) ([facility]), Secondary hypercoagulable state (HCC), Septic shock ([facility]), Sleep apnea, Sleep apnea, obstructive, Thyroid disease, Under care of team, Urinary bladder neurogenic dysfunction, and Urinary tract infection associated with indwelling urethral catheter ([facility])..  Currently hospitalized for the management of septic shock.  The Palliative Care Team is following to assist with patient and family

## 2024-01-17 NOTE — PLAN OF CARE
Problem: Respiratory - Adult  Goal: Achieves optimal ventilation and oxygenation  Outcome: Progressing  Flowsheets  Taken 1/16/2024 1937 by Yolanda Gay RCP  Achieves optimal ventilation and oxygenation:   Assess for changes in respiratory status   Assess for changes in mentation and behavior   Position to facilitate oxygenation and minimize respiratory effort   Oxygen supplementation based on oxygen saturation or arterial blood gases   Encourage broncho-pulmonary hygiene including cough, deep breathe, incentive spirometry   Assess the need for suctioning and aspirate as needed   Assess and instruct to report shortness of breath or any respiratory difficulty   Respiratory therapy support as indicated  Taken 1/16/2024 1547 by Yolanda Gay RCP  Achieves optimal ventilation and oxygenation:   Assess for changes in respiratory status   Assess for changes in mentation and behavior   Position to facilitate oxygenation and minimize respiratory effort   Oxygen supplementation based on oxygen saturation or arterial blood gases   Encourage broncho-pulmonary hygiene including cough, deep breathe, incentive spirometry   Assess the need for suctioning and aspirate as needed   Assess and instruct to report shortness of breath or any respiratory difficulty   Respiratory therapy support as indicated

## 2024-01-17 NOTE — PLAN OF CARE
Problem: Discharge Planning  Goal: Discharge to home or other facility with appropriate resources  1/17/2024 0911 by Don Dobbs RN  Outcome: Progressing  Flowsheets (Taken 1/17/2024 0800)  Discharge to home or other facility with appropriate resources: Identify barriers to discharge with patient and caregiver  1/17/2024 0519 by Karla Lu RN  Outcome: Progressing     Problem: Pain  Goal: Verbalizes/displays adequate comfort level or baseline comfort level  1/17/2024 0911 by Don Dobbs RN  Outcome: Progressing  Flowsheets (Taken 1/17/2024 0800)  Verbalizes/displays adequate comfort level or baseline comfort level: Encourage patient to monitor pain and request assistance  1/17/2024 0519 by Karla Lu RN  Outcome: Progressing     Problem: Safety - Adult  Goal: Free from fall injury  1/17/2024 0911 by Don Dobbs RN  Outcome: Progressing  1/17/2024 0519 by Karla Lu RN  Outcome: Progressing     Problem: Respiratory - Adult  Goal: Achieves optimal ventilation and oxygenation  1/17/2024 0911 by Don Dobbs RN  Outcome: Progressing  1/17/2024 0909 by Zachariah Crook RCP  Outcome: Progressing  Flowsheets (Taken 1/17/2024 0800 by Don Dobbs RN)  Achieves optimal ventilation and oxygenation: Assess for changes in respiratory status  1/17/2024 0519 by Karla Lu RN  Outcome: Progressing  1/17/2024 0158 by Dilan Erazo RCP  Outcome: Progressing  Flowsheets  Taken 1/16/2024 1937 by Yolanda Gay RCP  Achieves optimal ventilation and oxygenation:   Assess for changes in respiratory status   Assess for changes in mentation and behavior   Position to facilitate oxygenation and minimize respiratory effort   Oxygen supplementation based on oxygen saturation or arterial blood gases   Encourage broncho-pulmonary hygiene including cough, deep breathe, incentive spirometry   Assess the need for suctioning and aspirate as needed   Assess and instruct to report  RN  Outcome: Progressing     Problem: Nutrition Deficit:  Goal: Optimize nutritional status  1/17/2024 0911 by Don Dobbs, RN  Outcome: Progressing  1/17/2024 0519 by Karla Lu RN  Outcome: Progressing

## 2024-01-17 NOTE — PLAN OF CARE
Problem: Respiratory - Adult  Goal: Achieves optimal ventilation and oxygenation  1/17/2024 0909 by Zachariah Crook RCP  Outcome: Progressing

## 2024-01-17 NOTE — PROGRESS NOTES
.Review of labs showed CMP remarkable for Na 134, chloride 92, BUN 58 and creatinine 1.8 baseline about 1.3-1.5. CBC remarkable for WBC 15.7, hemoglbin 9.9. lactic acid 4.1. During previous hospitalizations patient was positive for Citrobacter Koseri and Enterococcus faecalis.  Previous sputum culture was positive for Pseudomonas.   Patient currently on two pressors Cipriano and Levophed. He does have trach in place and is on ventilator support. Blood pressures are maintaining. Current labs as below. Vanco switched to zyvox due to neisha on ckd. Platelet count dropping will consider switch zyvox to dapto if persistent drop. Patient does have art line, central line, chronic wells and trach.     Interval changes  1/16/2024   Patient Vitals for the past 8 hrs:   BP Temp Temp src Pulse Resp SpO2   01/16/24 2200 -- 99 °F (37.2 °C) Axillary 73 16 100 %   01/16/24 2151 -- -- -- -- 16 --   01/16/24 2100 -- 98.6 °F (37 °C) -- 97 17 100 %   01/16/24 2034 -- -- -- -- 18 --   01/16/24 2000 (!) 116/58 98.8 °F (37.1 °C) Bladder 78 14 99 %   01/16/24 1944 -- -- -- -- 19 --   01/16/24 1937 -- -- -- 73 19 100 %   01/16/24 1900 -- 98.6 °F (37 °C) -- 75 15 100 %   01/16/24 1730 -- 98.6 °F (37 °C) -- 86 24 100 %   01/16/24 1715 -- 98.4 °F (36.9 °C) -- 77 18 100 %   01/16/24 1703 -- -- -- -- 18 --   01/16/24 1700 -- 98.4 °F (36.9 °C) -- 78 19 100 %   01/16/24 1645 -- 98.4 °F (36.9 °C) -- 70 25 100 %   01/16/24 1630 -- 98.4 °F (36.9 °C) -- 81 26 100 %   01/16/24 1615 -- -- -- 96 23 100 %   01/16/24 1600 -- -- -- 72 27 99 %   01/16/24 1547 -- -- -- 71 16 100 %   01/16/24 1545 -- -- -- 72 19 100 %   01/16/24 1530 -- -- -- 71 19 100 %   01/16/24 1515 -- -- -- 71 22 100 %     1/4/24  Had open peg 1/3 and planned for more sx  15  Sp cx pseudomonas and U cx proteus  BC contamination  Vented and sedated -  Wells mary   New PEG drain bloody  2 ALBIN drains serosang  WBC up 45 range still  - no pressors -   Abd w VAC bad large wound    1/9/24  Hypo thermic  Q4H    lansoprazole  30 mg Oral BID AC    acetaminophen  1,000 mg Oral 3 times per day    docusate  100 mg Oral BID    senna  5 mL Oral BID    aspirin  81 mg Per NG tube Daily    levothyroxine  200 mcg Per G Tube Daily       Social History:     Social History     Socioeconomic History    Marital status:      Spouse name: Not on file    Number of children: Not on file    Years of education: Not on file    Highest education level: Not on file   Occupational History    Not on file   Tobacco Use    Smoking status: Never    Smokeless tobacco: Never   Vaping Use    Vaping Use: Never used   Substance and Sexual Activity    Alcohol use: No     Comment: quit drinking 2012    Drug use: No    Sexual activity: Not on file   Other Topics Concern    Not on file   Social History Narrative    Not on file     Social Determinants of Health     Financial Resource Strain: Not on file   Food Insecurity: Not on file   Transportation Needs: Not on file   Physical Activity: Not on file   Stress: Not on file   Social Connections: Not on file   Intimate Partner Violence: Not on file   Housing Stability: Not on file       Family History:     Family History   Adopted: Yes   Problem Relation Age of Onset    No Known Problems Mother         Adopted    No Known Problems Father         Adopted      Medical Decision Making:   I have independently reviewed/ordered the following labs:    CBC with Differential:   Recent Labs     01/15/24  0329 01/15/24  1012 01/16/24  0526   WBC 11.9*  --  14.4*   HGB 8.2* 8.2* 7.7*   HCT 25.8* 25.6* 24.5*   PLT See Reflexed IPF Result  --  155   LYMPHOPCT 10*  --  9*   MONOPCT 7  --  6       BMP:  Recent Labs     01/15/24  0329 01/15/24  1012 01/16/24  0526   * 135* 135   K 3.8 3.5* 3.4*     --  108*   CO2 14*  --  16*   BUN 36*  --  32*   CREATININE 1.1  --  1.0   MG 2.3  --  2.2       Hepatic Function Panel:   Recent Labs     01/15/24  0329   PROT 5.3*   LABALBU 2.1*   BILIDIR 0.5*   IBILI 0.5

## 2024-01-17 NOTE — ANESTHESIA PRE PROCEDURE
Department of Anesthesiology  Preprocedure Note       Name:  Ruben Dimas   Age:  58 y.o.  :  1965                                          MRN:  [id number]         Date:  2024      Surgeon: Surgeon(s):  Fazal Wood MD    Procedure: Procedure(s):  TAKE BACK ABDOMINAL WOUND DEBRIDEMENT, WOUND BED PREPERATION, WOUND VAC PLACEMENT ( MISONIX)    Medications prior to admission:   Prior to Admission medications    Medication Sig Start Date End Date Taking? Authorizing Provider   clonazePAM (KLONOPIN) 1 MG tablet 1 tablet by Orogastric route in the morning and 1 tablet in the evening. Do all this for 14 days. Max Daily Amount: 2 mg. 11/10/23 11/24/23  Zakia Sanchez MD   docusate (COLACE) 50 MG/5ML liquid 10 mLs by Per G Tube route 2 times daily 23   Zakia Sanchez MD   levothyroxine (SYNTHROID) 200 MCG tablet 1 tablet by Per G Tube route Daily 11/10/23   Zakia Sanchez MD   magnesium oxide (MAG-OX) 400 (240 Mg) MG tablet 1 tablet by PEG Tube route daily 23   Zakia Sanchez MD   ferrous sulfate (FE TABS 325) 325 (65 Fe) MG EC tablet Take 1 tablet by mouth daily (with breakfast) 23   Zakia Sanchez MD   midodrine (PROAMATINE) 5 MG tablet 3 tablets by Orogastric route every 6 hours 23   Zakia Sanchez MD   traZODone (DESYREL) 50 MG tablet 1.5 tablets by Per G Tube route nightly 23  Zakia Sanchez MD   albuterol (PROVENTIL) (2.5 MG/3ML) 0.083% nebulizer solution Take 3 mLs by nebulization every 4 hours as needed for Wheezing or Shortness of Breath 23   Zakia Sanchez MD   citalopram (CELEXA) 10 MG tablet 3 tablets by Orogastric route daily 11/10/23   Zakia Sanchez MD   famotidine (PEPCID) 20 MG tablet 1 tablet by Per G Tube route 2 times daily 23   Zakia Sanchez MD   bumetanide (BUMEX) 1 MG tablet 1 tablet by PEG Tube route 2 times daily 23   Zakia Sanchez MD   amiodarone (CORDARONE) 200 MG tablet 1 tablet by PEG Tube route 2 times daily

## 2024-01-17 NOTE — PLAN OF CARE
Problem: Discharge Planning  Goal: Discharge to home or other facility with appropriate resources  Outcome: Progressing     Problem: Pain  Goal: Verbalizes/displays adequate comfort level or baseline comfort level  Outcome: Progressing     Problem: Safety - Adult  Goal: Free from fall injury  Outcome: Progressing     Problem: Respiratory - Adult  Goal: Achieves optimal ventilation and oxygenation  1/17/2024 0519 by Karla Lu RN  Outcome: Progressing  1/17/2024 0158 by Dilan Erazo RCP  Outcome: Progressing  Flowsheets  Taken 1/16/2024 1937 by Yolanda Gay RCP  Achieves optimal ventilation and oxygenation:   Assess for changes in respiratory status   Assess for changes in mentation and behavior   Position to facilitate oxygenation and minimize respiratory effort   Oxygen supplementation based on oxygen saturation or arterial blood gases   Encourage broncho-pulmonary hygiene including cough, deep breathe, incentive spirometry   Assess the need for suctioning and aspirate as needed   Assess and instruct to report shortness of breath or any respiratory difficulty   Respiratory therapy support as indicated  Taken 1/16/2024 1547 by Yolanda Gay RCP  Achieves optimal ventilation and oxygenation:   Assess for changes in respiratory status   Assess for changes in mentation and behavior   Position to facilitate oxygenation and minimize respiratory effort   Oxygen supplementation based on oxygen saturation or arterial blood gases   Encourage broncho-pulmonary hygiene including cough, deep breathe, incentive spirometry   Assess the need for suctioning and aspirate as needed   Assess and instruct to report shortness of breath or any respiratory difficulty   Respiratory therapy support as indicated     Problem: Chronic Conditions and Co-morbidities  Goal: Patient's chronic conditions and co-morbidity symptoms are monitored and maintained or improved  Outcome: Progressing     Problem: Skin/Tissue

## 2024-01-17 NOTE — PROGRESS NOTES
01/16/24 1937   Care Plan - Respiratory Goals   Achieves optimal ventilation and oxygenation Assess for changes in respiratory status;Assess for changes in mentation and behavior;Position to facilitate oxygenation and minimize respiratory effort;Oxygen supplementation based on oxygen saturation or arterial blood gases;Encourage broncho-pulmonary hygiene including cough, deep breathe, incentive spirometry;Assess the need for suctioning and aspirate as needed;Assess and instruct to report shortness of breath or any respiratory difficulty;Respiratory therapy support as indicated

## 2024-01-17 NOTE — PROGRESS NOTES
01/17/24 1620   Patient Observation   SpO2 100 %   Vent Information   Vent Mode (S)  CPAP/PS   Ventilator Settings   FiO2  40 %   PEEP/CPAP (cmH2O) 8   Pressure Support (cm H2O) (S)  8 cm H2O   Vent Patient Data (Readings)   Vt (Measured) 406 mL   Rate Measured 18 br/min         Ventilator weaning trial attempted.

## 2024-01-18 ENCOUNTER — APPOINTMENT (OUTPATIENT)
Dept: GENERAL RADIOLOGY | Age: 59
DRG: 853 | End: 2024-01-18
Payer: MEDICARE

## 2024-01-18 ENCOUNTER — ANESTHESIA (OUTPATIENT)
Dept: OPERATING ROOM | Age: 59
End: 2024-01-18
Payer: MEDICARE

## 2024-01-18 LAB
ALLEN TEST: ABNORMAL
ANION GAP SERPL CALCULATED.3IONS-SCNC: 13 MMOL/L (ref 9–17)
BASOPHILS # BLD: 0.05 K/UL (ref 0–0.2)
BASOPHILS NFR BLD: 1 % (ref 0–2)
BUN SERPL-MCNC: 33 MG/DL (ref 6–20)
CA-I BLD-SCNC: 1.26 MMOL/L (ref 1.13–1.33)
CALCIUM SERPL-MCNC: 8.6 MG/DL (ref 8.6–10.4)
CHLORIDE SERPL-SCNC: 110 MMOL/L (ref 98–107)
CO2 SERPL-SCNC: 16 MMOL/L (ref 20–31)
CREAT SERPL-MCNC: 1 MG/DL (ref 0.7–1.2)
CRP SERPL HS-MCNC: 166.6 MG/L (ref 0–5)
EOSINOPHIL # BLD: 1.02 K/UL (ref 0–0.44)
EOSINOPHILS RELATIVE PERCENT: 12 % (ref 1–4)
ERYTHROCYTE [DISTWIDTH] IN BLOOD BY AUTOMATED COUNT: 18.2 % (ref 11.8–14.4)
FIO2: 40
GFR SERPL CREATININE-BSD FRML MDRD: >60 ML/MIN/1.73M2
GLUCOSE BLD-MCNC: 103 MG/DL (ref 75–110)
GLUCOSE BLD-MCNC: 110 MG/DL (ref 75–110)
GLUCOSE BLD-MCNC: 116 MG/DL (ref 75–110)
GLUCOSE BLD-MCNC: 143 MG/DL (ref 75–110)
GLUCOSE BLD-MCNC: 178 MG/DL (ref 75–110)
GLUCOSE BLD-MCNC: 77 MG/DL (ref 74–100)
GLUCOSE BLD-MCNC: 98 MG/DL (ref 75–110)
GLUCOSE BLD-MCNC: 99 MG/DL (ref 75–110)
GLUCOSE SERPL-MCNC: 99 MG/DL (ref 70–99)
HCT VFR BLD AUTO: 24.6 % (ref 40.7–50.3)
HGB BLD-MCNC: 7.8 G/DL (ref 13–17)
IMM GRANULOCYTES # BLD AUTO: 0.16 K/UL (ref 0–0.3)
IMM GRANULOCYTES NFR BLD: 2 %
LYMPHOCYTES NFR BLD: 0.97 K/UL (ref 1.1–3.7)
LYMPHOCYTES RELATIVE PERCENT: 11 % (ref 24–43)
MAGNESIUM SERPL-MCNC: 2.6 MG/DL (ref 1.6–2.6)
MCH RBC QN AUTO: 29.3 PG (ref 25.2–33.5)
MCHC RBC AUTO-ENTMCNC: 31.7 G/DL (ref 28.4–34.8)
MCV RBC AUTO: 92.5 FL (ref 82.6–102.9)
MODE: ABNORMAL
MONOCYTES NFR BLD: 0.6 K/UL (ref 0.1–1.2)
MONOCYTES NFR BLD: 7 % (ref 3–12)
NEGATIVE BASE EXCESS, ART: 9 MMOL/L (ref 0–2)
NEUTROPHILS NFR BLD: 68 % (ref 36–65)
NEUTS SEG NFR BLD: 5.87 K/UL (ref 1.5–8.1)
NRBC BLD-RTO: 0 PER 100 WBC
O2 DELIVERY DEVICE: ABNORMAL
PATIENT TEMP: 36.7
PHOSPHATE SERPL-MCNC: 5.1 MG/DL (ref 2.5–4.5)
PLATELET # BLD AUTO: 156 K/UL (ref 138–453)
PMV BLD AUTO: 10.3 FL (ref 8.1–13.5)
POC HCO3: 16 MMOL/L (ref 21–28)
POC LACTIC ACID: 0.4 MMOL/L (ref 0.56–1.39)
POC O2 SATURATION: 99.6 % (ref 94–98)
POC PCO2: 30.2 MM HG (ref 35–48)
POC PH: 7.33 (ref 7.35–7.45)
POC PO2: 193.1 MM HG (ref 83–108)
POTASSIUM SERPL-SCNC: 4.1 MMOL/L (ref 3.7–5.3)
RBC # BLD AUTO: 2.66 M/UL (ref 4.21–5.77)
RBC # BLD: ABNORMAL 10*6/UL
SAMPLE SITE: ABNORMAL
SODIUM SERPL-SCNC: 139 MMOL/L (ref 135–144)
WBC OTHER # BLD: 8.7 K/UL (ref 3.5–11.3)

## 2024-01-18 PROCEDURE — 6370000000 HC RX 637 (ALT 250 FOR IP)

## 2024-01-18 PROCEDURE — 6360000002 HC RX W HCPCS

## 2024-01-18 PROCEDURE — 6360000002 HC RX W HCPCS: Performed by: INTERNAL MEDICINE

## 2024-01-18 PROCEDURE — 80048 BASIC METABOLIC PNL TOTAL CA: CPT

## 2024-01-18 PROCEDURE — 3600000004 HC SURGERY LEVEL 4 BASE: Performed by: SURGERY

## 2024-01-18 PROCEDURE — 2500000003 HC RX 250 WO HCPCS

## 2024-01-18 PROCEDURE — 83735 ASSAY OF MAGNESIUM: CPT

## 2024-01-18 PROCEDURE — 82803 BLOOD GASES ANY COMBINATION: CPT

## 2024-01-18 PROCEDURE — 99233 SBSQ HOSP IP/OBS HIGH 50: CPT | Performed by: INTERNAL MEDICINE

## 2024-01-18 PROCEDURE — 71045 X-RAY EXAM CHEST 1 VIEW: CPT

## 2024-01-18 PROCEDURE — 84100 ASSAY OF PHOSPHORUS: CPT

## 2024-01-18 PROCEDURE — 83605 ASSAY OF LACTIC ACID: CPT

## 2024-01-18 PROCEDURE — 2720000010 HC SURG SUPPLY STERILE: Performed by: SURGERY

## 2024-01-18 PROCEDURE — 2580000003 HC RX 258: Performed by: SURGERY

## 2024-01-18 PROCEDURE — 2580000003 HC RX 258

## 2024-01-18 PROCEDURE — 6360000002 HC RX W HCPCS: Performed by: NURSE PRACTITIONER

## 2024-01-18 PROCEDURE — 2709999900 HC NON-CHARGEABLE SUPPLY: Performed by: SURGERY

## 2024-01-18 PROCEDURE — 82947 ASSAY GLUCOSE BLOOD QUANT: CPT

## 2024-01-18 PROCEDURE — 82330 ASSAY OF CALCIUM: CPT

## 2024-01-18 PROCEDURE — 86140 C-REACTIVE PROTEIN: CPT

## 2024-01-18 PROCEDURE — 6370000000 HC RX 637 (ALT 250 FOR IP): Performed by: NURSE PRACTITIONER

## 2024-01-18 PROCEDURE — 2700000000 HC OXYGEN THERAPY PER DAY

## 2024-01-18 PROCEDURE — 0JB80ZZ EXCISION OF ABDOMEN SUBCUTANEOUS TISSUE AND FASCIA, OPEN APPROACH: ICD-10-PCS | Performed by: SURGERY

## 2024-01-18 PROCEDURE — 2580000003 HC RX 258: Performed by: INTERNAL MEDICINE

## 2024-01-18 PROCEDURE — 2000000000 HC ICU R&B

## 2024-01-18 PROCEDURE — 3600000014 HC SURGERY LEVEL 4 ADDTL 15MIN: Performed by: SURGERY

## 2024-01-18 PROCEDURE — 37799 UNLISTED PX VASCULAR SURGERY: CPT

## 2024-01-18 PROCEDURE — 3700000001 HC ADD 15 MINUTES (ANESTHESIA): Performed by: SURGERY

## 2024-01-18 PROCEDURE — 3700000000 HC ANESTHESIA ATTENDED CARE: Performed by: SURGERY

## 2024-01-18 PROCEDURE — 94761 N-INVAS EAR/PLS OXIMETRY MLT: CPT

## 2024-01-18 PROCEDURE — 94003 VENT MGMT INPAT SUBQ DAY: CPT

## 2024-01-18 PROCEDURE — 2500000003 HC RX 250 WO HCPCS: Performed by: SURGERY

## 2024-01-18 PROCEDURE — 85025 COMPLETE CBC W/AUTO DIFF WBC: CPT

## 2024-01-18 RX ORDER — ROCURONIUM BROMIDE 10 MG/ML
INJECTION, SOLUTION INTRAVENOUS PRN
Status: DISCONTINUED | OUTPATIENT
Start: 2024-01-18 | End: 2024-01-18 | Stop reason: SDUPTHER

## 2024-01-18 RX ORDER — SODIUM CHLORIDE 9 MG/ML
INJECTION, SOLUTION INTRAVENOUS CONTINUOUS PRN
Status: DISCONTINUED | OUTPATIENT
Start: 2024-01-18 | End: 2024-01-18 | Stop reason: SDUPTHER

## 2024-01-18 RX ORDER — MIDAZOLAM HYDROCHLORIDE 1 MG/ML
INJECTION INTRAMUSCULAR; INTRAVENOUS PRN
Status: DISCONTINUED | OUTPATIENT
Start: 2024-01-18 | End: 2024-01-18 | Stop reason: SDUPTHER

## 2024-01-18 RX ORDER — MAGNESIUM HYDROXIDE 1200 MG/15ML
LIQUID ORAL CONTINUOUS PRN
Status: COMPLETED | OUTPATIENT
Start: 2024-01-18 | End: 2024-01-18

## 2024-01-18 RX ORDER — PROPOFOL 10 MG/ML
INJECTION, EMULSION INTRAVENOUS CONTINUOUS PRN
Status: DISCONTINUED | OUTPATIENT
Start: 2024-01-18 | End: 2024-01-18 | Stop reason: SDUPTHER

## 2024-01-18 RX ORDER — FENTANYL CITRATE 50 UG/ML
INJECTION, SOLUTION INTRAMUSCULAR; INTRAVENOUS PRN
Status: DISCONTINUED | OUTPATIENT
Start: 2024-01-18 | End: 2024-01-18 | Stop reason: SDUPTHER

## 2024-01-18 RX ORDER — DEXTROSE MONOHYDRATE, SODIUM CHLORIDE, AND POTASSIUM CHLORIDE 50; 1.49; 4.5 G/1000ML; G/1000ML; G/1000ML
INJECTION, SOLUTION INTRAVENOUS CONTINUOUS
Status: DISCONTINUED | OUTPATIENT
Start: 2024-01-18 | End: 2024-01-18

## 2024-01-18 RX ORDER — WOUND DRESSING ADHESIVE - LIQUID
LIQUID MISCELLANEOUS PRN
Status: DISCONTINUED | OUTPATIENT
Start: 2024-01-18 | End: 2024-01-18 | Stop reason: ALTCHOICE

## 2024-01-18 RX ADMIN — MEROPENEM 1000 MG: 1 INJECTION, POWDER, FOR SOLUTION INTRAVENOUS at 08:41

## 2024-01-18 RX ADMIN — MIDAZOLAM 2 MG: 1 INJECTION INTRAMUSCULAR; INTRAVENOUS at 13:24

## 2024-01-18 RX ADMIN — MEROPENEM 1000 MG: 1 INJECTION, POWDER, FOR SOLUTION INTRAVENOUS at 16:14

## 2024-01-18 RX ADMIN — LANSOPRAZOLE 30 MG: 30 TABLET, ORALLY DISINTEGRATING, DELAYED RELEASE ORAL at 06:21

## 2024-01-18 RX ADMIN — HYDROMORPHONE HYDROCHLORIDE 0.5 MG: 1 INJECTION, SOLUTION INTRAMUSCULAR; INTRAVENOUS; SUBCUTANEOUS at 23:37

## 2024-01-18 RX ADMIN — SODIUM CHLORIDE: 9 INJECTION, SOLUTION INTRAVENOUS at 13:24

## 2024-01-18 RX ADMIN — OXYCODONE HYDROCHLORIDE 10 MG: 5 TABLET ORAL at 12:26

## 2024-01-18 RX ADMIN — ACETAMINOPHEN 1000 MG: 650 SOLUTION ORAL at 20:48

## 2024-01-18 RX ADMIN — ASPIRIN 81 MG 81 MG: 81 TABLET ORAL at 08:41

## 2024-01-18 RX ADMIN — LANSOPRAZOLE 30 MG: 30 TABLET, ORALLY DISINTEGRATING, DELAYED RELEASE ORAL at 16:09

## 2024-01-18 RX ADMIN — SENNOSIDES 8.8 MG: 8.8 LIQUID ORAL at 08:41

## 2024-01-18 RX ADMIN — AMIODARONE HYDROCHLORIDE 200 MG: 200 TABLET ORAL at 08:42

## 2024-01-18 RX ADMIN — OXYCODONE HYDROCHLORIDE 10 MG: 5 TABLET ORAL at 08:42

## 2024-01-18 RX ADMIN — MEROPENEM 1000 MG: 1 INJECTION, POWDER, FOR SOLUTION INTRAVENOUS at 23:02

## 2024-01-18 RX ADMIN — ROCURONIUM BROMIDE 50 MG: 10 INJECTION, SOLUTION INTRAVENOUS at 13:27

## 2024-01-18 RX ADMIN — FENTANYL CITRATE 50 MCG: 50 INJECTION, SOLUTION INTRAMUSCULAR; INTRAVENOUS at 14:00

## 2024-01-18 RX ADMIN — FENTANYL CITRATE 50 MCG: 50 INJECTION, SOLUTION INTRAMUSCULAR; INTRAVENOUS at 13:47

## 2024-01-18 RX ADMIN — OXYCODONE HYDROCHLORIDE 10 MG: 5 TABLET ORAL at 04:34

## 2024-01-18 RX ADMIN — OXYCODONE HYDROCHLORIDE 10 MG: 5 TABLET ORAL at 23:37

## 2024-01-18 RX ADMIN — SENNOSIDES 8.8 MG: 8.8 LIQUID ORAL at 20:48

## 2024-01-18 RX ADMIN — OXYCODONE HYDROCHLORIDE 10 MG: 5 TABLET ORAL at 20:49

## 2024-01-18 RX ADMIN — PROPOFOL 15 MCG/KG/MIN: 10 INJECTION, EMULSION INTRAVENOUS at 14:00

## 2024-01-18 RX ADMIN — MIDODRINE HYDROCHLORIDE 15 MG: 5 TABLET ORAL at 16:09

## 2024-01-18 RX ADMIN — DOCUSATE SODIUM LIQUID 100 MG: 100 LIQUID ORAL at 20:49

## 2024-01-18 RX ADMIN — HEPARIN SODIUM 7500 UNITS: 5000 INJECTION INTRAVENOUS; SUBCUTANEOUS at 20:49

## 2024-01-18 RX ADMIN — MIDODRINE HYDROCHLORIDE 15 MG: 5 TABLET ORAL at 08:41

## 2024-01-18 RX ADMIN — LEVOTHYROXINE SODIUM 200 MCG: 100 TABLET ORAL at 06:21

## 2024-01-18 RX ADMIN — SODIUM CHLORIDE 0.5 MCG/MIN: 9 INJECTION, SOLUTION INTRAVENOUS at 13:27

## 2024-01-18 RX ADMIN — OXYCODONE HYDROCHLORIDE 10 MG: 5 TABLET ORAL at 16:09

## 2024-01-18 RX ADMIN — POTASSIUM CHLORIDE, DEXTROSE MONOHYDRATE AND SODIUM CHLORIDE: 150; 5; 450 INJECTION, SOLUTION INTRAVENOUS at 10:51

## 2024-01-18 RX ADMIN — MIDODRINE HYDROCHLORIDE 15 MG: 5 TABLET ORAL at 12:26

## 2024-01-18 RX ADMIN — ACETAMINOPHEN 1000 MG: 650 SOLUTION ORAL at 06:10

## 2024-01-18 RX ADMIN — HYDROMORPHONE HYDROCHLORIDE 0.5 MG: 1 INJECTION, SOLUTION INTRAMUSCULAR; INTRAVENOUS; SUBCUTANEOUS at 06:32

## 2024-01-18 ASSESSMENT — PAIN DESCRIPTION - LOCATION
LOCATION: ABDOMEN
LOCATION: ABDOMEN;LEG
LOCATION: ABDOMEN

## 2024-01-18 ASSESSMENT — PAIN SCALES - GENERAL
PAINLEVEL_OUTOF10: 0
PAINLEVEL_OUTOF10: 0
PAINLEVEL_OUTOF10: 5
PAINLEVEL_OUTOF10: 0
PAINLEVEL_OUTOF10: 7
PAINLEVEL_OUTOF10: 7

## 2024-01-18 ASSESSMENT — PAIN SCALES - WONG BAKER
WONGBAKER_NUMERICALRESPONSE: 0

## 2024-01-18 ASSESSMENT — PULMONARY FUNCTION TESTS
PIF_VALUE: 24
PIF_VALUE: 18
PIF_VALUE: 21
PIF_VALUE: 20
PIF_VALUE: 15
PIF_VALUE: 25
PIF_VALUE: 21
PIF_VALUE: 23
PIF_VALUE: 23
PIF_VALUE: 17
PIF_VALUE: 26
PIF_VALUE: 19
PIF_VALUE: 14
PIF_VALUE: 21
PIF_VALUE: 23
PIF_VALUE: 21
PIF_VALUE: 22
PIF_VALUE: 10
PIF_VALUE: 26
PIF_VALUE: 21
PIF_VALUE: 18
PIF_VALUE: 18
PIF_VALUE: 26
PIF_VALUE: 25
PIF_VALUE: 24
PIF_VALUE: 19
PIF_VALUE: 23

## 2024-01-18 ASSESSMENT — PAIN DESCRIPTION - ORIENTATION: ORIENTATION: RIGHT;LEFT

## 2024-01-18 NOTE — PROGRESS NOTES
ICU PROGRESS NOTE        PATIENT NAME: Ruben Dimas  MEDICAL RECORD NO. [id number]  DATE: 1/18/2024    HD: # 17    ASSESSMENT    Patient Active Problem List   Diagnosis    Alcoholic cirrhosis of liver (HCC), sober since 2013    Peripheral edema    Bipolar disorder (HCC)    Type 2 diabetes mellitus with diabetic neuropathy, with long-term current use of insulin (HCC)    Essential hypertension, currently hypotensive    Dyslipidemia    Weakness    Acute metabolic encephalopathy    FABI (obstructive sleep apnea)    Primary osteoarthritis of right knee    Type 2 diabetes mellitus with diabetic neuropathy (HCC)    Acquired hypothyroidism    Urine retention    Anemia    Slow transit constipation    Iron deficiency anemia due to chronic blood loss    Gastroesophageal reflux disease without esophagitis    LEONARDA (acute kidney injury) (HCC)    Secondary esophageal varices without bleeding (HCC)    Portal hypertension (HCC)    Obesity, morbid, BMI 50 or higher (HCC)    Venous stasis dermatitis of both lower extremities    Cellulitis of perineum    Chronic indwelling Clemons catheter    Anxiety and depression    Back pain, chronic    BPH (benign prostatic hyperplasia)    Chronic diastolic CHF (congestive heart failure) (HCC)    Cirrhosis (HCC)    Diabetes mellitus (HCC)    GERD (gastroesophageal reflux disease)    Hepatitis    Hiatal hernia    Hypertension, essential    IBS (irritable bowel syndrome)    Morbid obesity with BMI of 45.0-49.9, adult (HCC)    Neuropathy    Chronic respiratory failure with hypoxia, on home oxygen therapy (HCC)    Sleep apnea    Thyroid disease    Urinary bladder neurogenic dysfunction    Acute UTI    Musculoskeletal immobility    Pyocystis    Myoclonic jerking    Thrombocytopenia (HCC)    Hypotension    Sepsis with acute hypercapnic respiratory failure and septic shock (HCC)    Right lower lobe pneumonia    Acute kidney injury superimposed on CKD (HCC)    Somnolence    Acute respiratory acidosis (HCC)

## 2024-01-18 NOTE — PROGRESS NOTES
MD Ramu at [facility] OR    CYSTOSCOPY N/A 06/08/2022    CYSTOSCOPY, REMOVAL OF SMALL BLADDER STONE AND BLADDER IRRIGATION performed by Fazal Guallpa MD at [facility] OR    ENDOSCOPY, COLON, DIAGNOSTIC      HERNIA REPAIR      INCISIONAL HERNIA REPAIR  05/20/2018    repair and reduction of recurrent incarcerated incisional hernia with mesh    LAPAROTOMY N/A 1/2/2024    LAPAROTOMY EXPLORATORY, DEBRIDEMENT OF SUBQ TISSUE INCLUDING FASCIA 50X44, APPLICATION OF WOUND VAC >50 SQ CM, PARTIAL GASTRECTOMY, EXTENSIVE LYSIS OF ADHESIONS performed by Escobar Ulloa MD at [facility] OR    LAPAROTOMY N/A 1/3/2024    REOPEN LAPAROTOMY, WOUND VAC PLACEMENT, OPEN GASTROSTOMY TUBE PLACEMENT, REPAIR OF LARGE VENTRAL DEFECT WITH DEVON MATRIX, DEBRIDEMENT OF SUBQUTANEOUS TISSUE AND MUSCLE, ALBIN DRAINS X2 performed by Mary Carmen Monte MD at [facility] OR    MS CYSTOURETHROSCOPY N/A 01/24/2018    CYSTOSCOPY /DILATION performed by Fazal Guallpa MD at [facility] OR    MS EGD TRANSORAL BIOPSY SINGLE/MULTIPLE N/A 07/19/2017    EGD BIOPSY performed by Mike Gu MD at [facility] OR    MS I&D BELOW FASCIA FOOT 1 BURSAL SPACE Bilateral 08/22/2017    FOOT DEBRIDEMENT INCISION AND DRAINAGE with bone bx performed by Porfirio Hernandez DPM at [facility] OR    TRACHEOSTOMY N/A 11/8/2023    TRACHEOTOMY performed by Mary Carmen Monte MD at [facility] OR    UPPER GASTROINTESTINAL ENDOSCOPY  07/19/2017    polypectomy    UPPER GASTROINTESTINAL ENDOSCOPY N/A 03/14/2019    EGD BIOPSY performed by Juan Arriaga MD at [facility] Endoscopy    UPPER GASTROINTESTINAL ENDOSCOPY N/A 04/18/2023    EGD ESOPHAGOGASTRODUODENOSCOPY performed by Dorian Rendon MD at [facility] OR    UPPER GASTROINTESTINAL ENDOSCOPY N/A 11/8/2023    EGD ESOPHAGOGASTRODUODENOSCOPY PEG TUBE INSERTION performed by Mary Carmen Monte MD at [facility] OR    VENTRAL HERNIA REPAIR N/A 05/20/2018    REPAIR AND REDUCTION OF RECURRENT INCARCERATED INCISIONAL HERNIA WITH MESH performed by Bijan Camejo MD at [facility] OR  16* 16*   BUN 32* 29*   CREATININE 1.0 0.9   MG 2.2 1.9       Hepatic Function Panel:   Recent Labs     01/15/24  0329 01/17/24  0442   PROT 5.3* 4.9*   LABALBU 2.1* 1.9*   BILIDIR 0.5* 0.7*   IBILI 0.5 0.2   BILITOT 1.0 0.9   ALKPHOS 241* 324*   ALT 17 12   AST 31 37       No results for input(s): \"RPR\" in the last 72 hours.  No results for input(s): \"HIV\" in the last 72 hours.  No results for input(s): \"BC\" in the last 72 hours.  Lab Results   Component Value Date/Time    CREATININE 0.9 01/17/2024 04:42 AM    GLUCOSE 158 01/17/2024 04:42 AM    GLUCOSE 102 08/30/2011 02:20 PM       Detailed results:        Thank you for allowing us to participate in the care of this patient.Please call with questions.    This note is created with the assistance of a speech recognition program.  While intending to generate adocument that actually reflects the content of the visit, the document can still have some errors including those of syntax and sound a like substitutions which may escape proof reading.  It such instances, actual meaningcan be extrapolated by contextual diversion.            Lurdes Muller MD  Office: [phone]  Perfect serve / office [phone]

## 2024-01-18 NOTE — PLAN OF CARE
Problem: Discharge Planning  Goal: Discharge to home or other facility with appropriate resources  1/17/2024 0911 by Don Dobbs, RN  Outcome: Progressing  Flowsheets (Taken 1/17/2024 0800)  Discharge to home or other facility with appropriate resources: Identify barriers to discharge with patient and caregiver     Problem: Pain  Goal: Verbalizes/displays adequate comfort level or baseline comfort level  1/17/2024 1932 by Niall Corrales RN  Outcome: Progressing  Flowsheets (Taken 1/17/2024 1600 by Don Dobbs, RN)  Verbalizes/displays adequate comfort level or baseline comfort level: Encourage patient to monitor pain and request assistance  1/17/2024 0911 by Don Dobbs, RN  Outcome: Progressing  Flowsheets (Taken 1/17/2024 0800)  Verbalizes/displays adequate comfort level or baseline comfort level: Encourage patient to monitor pain and request assistance

## 2024-01-18 NOTE — PROGRESS NOTES
[facility] -   Infectious diseases evaluation  admission date 1/1/2024    reason for consultation:   Necrotizing Faciitis    Impression :   Current:  1/1/24 Necrotizing faciitis 2/2 Polymicrobial infection with T2DM and PEG tube dislodgement and infected   LEONARDA on CKD   1/2/24 S/p partial wedge gastrectomy due to infected PEG tube and dislodgement   1/2/24 S/p debridement of abdomen and indwelling mesh removal,  due to concern for necrotizing fasciitis, wound vac in place  Cx abd wall 1/5 kleb x 2 and enterococcus and saccharomyces   1/3/24 GASTROSTOMY TUBE PLACEMENT, REPAIR OF LARGE VENTRAL DEFECT   Alcoholic Liver cirrhosis - found intra op  Elevated CRP  Lactic acidosis- initial 4.1 now 6.2  Bandemia leukemoid reaction- WBC 15 to 38 to 27 -45  Proteus UTI - treated  RLL pseudomonas pneumonia 1/1 - treated  Iv phlebitis -iv removed   1/11 R lung collapse - sp again pseudomonas R meropenem    Other:    Discussion / summary of stay / plan of care   Respiratory culture: pseudomonas multiS 1/1/24 -treated  U cx proteus multiS - treated  Bcx pending - SCN x 1  is a contamination  MRSA swab and COVID pending   cx of the abd wall  of 1/5/24 enterococcus and kleb and saccharomyces  Leukemoid reaction better 16 --27 fluctuating  Zyvox stopped 1/6 due to thrombocytopenia  Avoid fluconazole due to amiodarone  1/7 CT abd suggesting bilat LL atelectasis rather than  pneumonia   micafungin  1/9/24 - stop 1/13  Given due to persistent bandemia,  cover the fungus  1/11 R arm iv phlebitis - added doxy- stop 1/14  RLL pseudomonas pneumonia 1/1 - treated  Iv phlebitis -iv removed   1/11 R lung collapse - sp again pseudomonas but meropenem R - switch to levaquin 1/18 till /24  Recommendations   Treating large abd wound infection w kleb enterococcus and saccharomyces  Bandemia persistent but ultimately improved  Iv phlebitis pulled and resolved  R lung collapse 1/11 w mucous plug, sp  Retention, urine 01/24/2018    SBO (small bowel obstruction) ([facility]) 05/19/2018    Secondary hypercoagulable state ([facility]) 4/18/2023    Septic shock ([facility])     Sleep apnea     suspected juany, never has had PSG study.  HAS NO MACHINE    Sleep apnea, obstructive     pt noncompliant w/ CPAP @ home.     Thyroid disease     Under care of team     Urologist- Dr. chowdhury    Urinary bladder neurogenic dysfunction     Urinary tract infection associated with indwelling urethral catheter ([facility]) 11/04/2020       Past Surgical  History:     Past Surgical History:   Procedure Laterality Date    ABDOMEN SURGERY      hernia repair x4 (ventral)    ABDOMEN SURGERY N/A 1/5/2024    PREPARATION OF WOUND BED, PLACEMENT OF SKIN SUBSTITUTE, WOUND VAC PLACEMENT, IRRIGATION AND DEBRIDEMENT OF RIGHT LOWER ABDOMINAL WOUND performed by Stephanie Joseph MD at [facility] OR    ABDOMEN SURGERY N/A 1/8/2024    WOUND VAC EXCHANGE, GRAFT PREPERATION 54X47 AND NWP GREATER THAN 50 SQ CM performed by Escobar Ulloa MD at [facility] OR    ABDOMEN SURGERY N/A 1/13/2024    TAKE BACK ABDOMINAL  WOUND DEBRIDEMENT, WOUND BED PREPARATION, WOUND VAC CHANGE  (MISONIX) performed by Mary Carmen Monte MD at [facility] OR    ABDOMEN SURGERY N/A 1/15/2024    TAKE BACK ABDOMINAL  WOUND DEBRIDEMENT,  WOUND VAC CHANGE performed by Fazal Wood MD at [facility] OR    COLONOSCOPY      2012    COLONOSCOPY N/A 04/19/2023    COLONOSCOPY DIAGNOSTIC performed by Dorian Rendon MD at [facility] OR    COLONOSCOPY  04/20/2023    COLONOSCOPY N/A 4/20/2023    COLONOSCOPY DIAGNOSTIC performed by Dorian Rendon MD at [facility] OR    CYSTOSCOPY  01/24/2018    CYSTOSCOPY  02/20/2019    CYSTOSCOPY N/A 02/20/2019    CYSTOSCOPY DILATION performed by Fazal Chowdhury MD at [facility] OR    CYSTOSCOPY N/A 08/23/2019    CYSTOSCOPY/ DILATION NICOLE CATHETER EXCHANGE performed by Fazal Chowdhury MD at [facility] OR    CYSTOSCOPY N/A 06/08/2022    CYSTOSCOPY, REMOVAL OF SMALL BLADDER STONE AND BLADDER IRRIGATION performed by

## 2024-01-18 NOTE — PLAN OF CARE
Problem: Discharge Planning  Goal: Discharge to home or other facility with appropriate resources  Outcome: Progressing  Flowsheets (Taken 1/18/2024 0800)  Discharge to home or other facility with appropriate resources: Identify barriers to discharge with patient and caregiver     Problem: Pain  Goal: Verbalizes/displays adequate comfort level or baseline comfort level  Outcome: Progressing     Problem: Safety - Adult  Goal: Free from fall injury  Outcome: Progressing     Problem: Respiratory - Adult  Goal: Achieves optimal ventilation and oxygenation  1/18/2024 0944 by Don Dobbs RN  Outcome: Progressing  1/18/2024 0843 by Zachariah Crook RCP  Outcome: Progressing  Flowsheets (Taken 1/18/2024 0800 by Don Dobbs RN)  Achieves optimal ventilation and oxygenation: Assess for changes in respiratory status     Problem: Chronic Conditions and Co-morbidities  Goal: Patient's chronic conditions and co-morbidity symptoms are monitored and maintained or improved  Outcome: Progressing  Flowsheets (Taken 1/18/2024 0800)  Care Plan - Patient's Chronic Conditions and Co-Morbidity Symptoms are Monitored and Maintained or Improved: Monitor and assess patient's chronic conditions and comorbid symptoms for stability, deterioration, or improvement     Problem: Skin/Tissue Integrity  Goal: Absence of new skin breakdown  Description: 1.  Monitor for areas of redness and/or skin breakdown  2.  Assess vascular access sites hourly  3.  Every 4-6 hours minimum:  Change oxygen saturation probe site  4.  Every 4-6 hours:  If on nasal continuous positive airway pressure, respiratory therapy assess nares and determine need for appliance change or resting period.  Outcome: Progressing     Problem: ABCDS Injury Assessment  Goal: Absence of physical injury  Outcome: Progressing     Problem: Nutrition Deficit:  Goal: Optimize nutritional status  Outcome: Progressing

## 2024-01-18 NOTE — BRIEF OP NOTE
Brief Postoperative Note      Patient: Ruben Dimas  YOB: 1965  MRN: [id number]    Date of Procedure: 1/18/2024    Pre-Op Diagnosis Codes:     * Necrotizing fasciitis (HCC) [M72.6]    Post-Op Diagnosis: Same       Procedure(s):  TAKE BACK ABDOMINAL WOUND DEBRIDEMENT, Debridement of open wound (1530 sq cm), WOUND VAC PLACEMENT  (MISONIX)    Surgeon(s):  Fazal Wood MD    Assistant:  Resident: Nano Remy MD; Cat Yoo DO    Anesthesia: General    Estimated Blood Loss (mL): Minimal    Complications: None    Specimens:   * No specimens in log *    Implants:  * No implants in log *      Drains:   Closed/Suction Drain Lateral;Superior RUQ Bulb (Active)   Site Description Reddened;Leaking at site 01/18/24 0800   Dressing Status Clean, dry & intact 01/18/24 0800   Drainage Appearance Serous 01/18/24 0800   Drain Status Compressed;To bulb suction 01/18/24 0800   Output (ml) 100 ml 01/18/24 1247       Negative Pressure Wound Therapy Abdomen Medial;Upper (Active)   Wound Type Surgical 01/18/24 0800   Dressing Type Black Foam 01/18/24 0800   Cycle Continuous 01/18/24 0800   Target Pressure (mmHg) 150 01/18/24 0800   Canister changed? No 01/18/24 0800   Dressing Status Clean, dry & intact 01/18/24 0800   Dressing Changed Changed/New 01/17/24 1600   Drainage Amount Moderate 01/18/24 0800   Drainage Description Brown 01/18/24 0800   Dressing Change Due 01/18/24 01/17/24 1600   Output (ml) 300 ml 01/18/24 0645   Wound Assessment Other (Comment) 01/17/24 1600   Yokasta-wound Assessment Other (Comment) 01/17/24 1600   Odor None 01/17/24 1600       Gastrostomy/Enterostomy/Jejunostomy Tube Gastrostomy RUQ 1 20 fr (Active)   Drainage Appearance None 01/18/24 0800   Site Description Intact;Leaking at site 01/18/24 0800   J Port Status Clamped 01/18/24 0800   G Port Status Infusing 01/18/24 0800   Surrounding Skin Reddened 01/18/24 0800   Dressing Status Other (Comment) 01/17/24 1600   Dressing Type Open to air

## 2024-01-18 NOTE — PROGRESS NOTES
01/17/24 1929   Vent Information   Vent Mode (S)  AC/PRVC   Ventilator Settings   FiO2  40 %   Resp Rate (Set) (S)  16 bpm   PEEP/CPAP (cmH2O) 8   Vt (Set, mL) (S)  600 mL         Ventilator is back to full support settings.

## 2024-01-18 NOTE — ANESTHESIA POSTPROCEDURE EVALUATION
Department of Anesthesiology  Postprocedure Note    Patient: Ruben Dimas  MRN: [id number]  YOB: 1965  Date of evaluation: 1/18/2024    Procedure Summary       Date: 01/18/24 Room / Location: [address]    Anesthesia Start: 1324 Anesthesia Stop: 1455    Procedure: TAKE BACK ABDOMINAL WOUND DEBRIDEMENT, WOUND BED PREPARATION, WOUND VAC PLACEMENT  (MISONIX) Diagnosis:       Necrotizing fasciitis (HCC)      (Necrotizing fasciitis (HCC) [M72.6])    Surgeons: Fazal Wood MD Responsible Provider: Abelino Christensen MD    Anesthesia Type: general ASA Status: 4            Anesthesia Type: No value filed.    Des Phase I:      Des Phase II:      Anesthesia Post Evaluation    Patient location during evaluation: bedside  Patient participation: complete - patient cannot participate  Level of consciousness: sedated and ventilated  Airway patency: patent  Nausea & Vomiting: no vomiting and no nausea  Cardiovascular status: blood pressure returned to baseline and hemodynamically stable  Respiratory status: ventilator and intubated  Hydration status: stable  Comments: BP (!) 125/105   Pulse 62   Temp 98.1 °F (36.7 °C) (Bladder)   Resp 14   Ht 1.778 m (5' 10\")   Wt (!) 154.8 kg (341 lb 3.2 oz)   SpO2 100%   BMI 48.96 kg/m²     Pain management: adequate    No notable events documented.

## 2024-01-18 NOTE — OP NOTE
Operative Note      Patient: Ruben Dimas  YOB: 1965  MRN: [id number]    Date of Procedure: 1/18/2024    Pre-Op Diagnosis Codes:     * Necrotizing fasciitis (HCC) [M72.6]    Post-Op Diagnosis: Same       Procedure(s):  TAKE BACK ABDOMINAL WOUND DEBRIDEMENT, WOUND BED PREPARATION, WOUND VAC PLACEMENT  (MISONIX)    Surgeon(s):  Fazal Wood MD    Assistant:   Resident: Nano Remy MD; Cat Yoo DO    Anesthesia: General    Estimated Blood Loss (mL): Minimal    Complications: None    Specimens:   * No specimens in log *    Implants:  * No implants in log *      Drains:   Closed/Suction Drain Lateral;Superior RUQ Bulb (Active)   Site Description Reddened;Leaking at site 01/18/24 0800   Dressing Status Clean, dry & intact 01/18/24 0800   Drainage Appearance Serous 01/18/24 0800   Drain Status Compressed;To bulb suction 01/18/24 0800   Output (ml) 100 ml 01/18/24 1247       Negative Pressure Wound Therapy Abdomen Medial;Upper (Active)   Wound Type Surgical 01/18/24 0800   Dressing Type Black Foam 01/18/24 0800   Cycle Continuous 01/18/24 0800   Target Pressure (mmHg) 150 01/18/24 0800   Canister changed? No 01/18/24 0800   Dressing Status Clean, dry & intact 01/18/24 0800   Dressing Changed Changed/New 01/17/24 1600   Drainage Amount Moderate 01/18/24 0800   Drainage Description Brown 01/18/24 0800   Dressing Change Due 01/18/24 01/17/24 1600   Output (ml) 300 ml 01/18/24 0645   Wound Assessment Other (Comment) 01/17/24 1600   Yokasta-wound Assessment Other (Comment) 01/17/24 1600   Odor None 01/17/24 1600       Gastrostomy/Enterostomy/Jejunostomy Tube Gastrostomy RUQ 1 20 fr (Active)   Drainage Appearance None 01/18/24 0800   Site Description Intact;Leaking at site 01/18/24 0800   J Port Status Clamped 01/18/24 0800   G Port Status Infusing 01/18/24 0800   Surrounding Skin Reddened 01/18/24 0800   Dressing Status Other (Comment) 01/17/24 1600   Dressing Type Open to air 01/18/24 0800   G-Tube    Urine Color Yellow 01/11/24 0800   Urine Appearance Clear 01/11/24 0800       [REMOVED] External Urinary Catheter (Removed)   Site Assessment Intact 01/13/24 0528   Placement Replaced 01/13/24 0528   Securement Method Tape 01/13/24 0528   Catheter Care Suction Canister/Tubing changed 01/13/24 0528   Perineal Care Yes 01/13/24 0528   Suction 40 mmgHg continuous 01/13/24 0528   Urine Color Yellow 01/13/24 0528   Urine Appearance Clear 01/13/24 0528   Output (mL) 200 mL 01/14/24 1200       Findings:  Excisional debridement with Mysonix of open wound ( 45x34 = 1530 sq cm total area).  Area of biologic mesh breakdown.  Underlying/exposed bowel healthy.  Adaptic placed over mesh/bowel, then white foam over mesh/bowel, then black foam over entire wound.       Detailed Description of Procedure:   The patient was brought into the operating room and transferred to the OR table in supine position. General anesthesia was induced and he was oxygenated with the already placed tracheostomy tube. A time out was called ensuring correct patient, positioning, procedure, medications. The old wound vac was removed. The area was prepped and draped in the usual sterile fashion.     The wound bed was measured to be 45cm x 34cm, and noted to have healthy appearing granulation tissue. The biologic mesh was in place in the epigastric area, with a larger area of disruption of the mesh. There is a small area of exposed bowel. A photo was placed in the chart. Mysonix was used to mechanically debride the open wound. Metzenbaum scissors were used to excise any dusky appearing tissue. Hemostasis was maintained using Bovie electrocautery. Adaptic gauze was placed on the biologic mesh, and the area of disruption in the epigastric area was covered with white foam. The entire wound was covered with black sponge and wound vac drape. The wound vac was set to continuous suction at 125mmHg and had a good seal at the end of the case. All counts were

## 2024-01-19 ENCOUNTER — APPOINTMENT (OUTPATIENT)
Dept: GENERAL RADIOLOGY | Age: 59
DRG: 853 | End: 2024-01-19
Payer: MEDICARE

## 2024-01-19 LAB
ALBUMIN SERPL-MCNC: 2 G/DL (ref 3.5–5.2)
ALBUMIN/GLOB SERPL: 0.6 {RATIO} (ref 1–2.5)
ALP SERPL-CCNC: 917 U/L (ref 40–129)
ALT SERPL-CCNC: 27 U/L (ref 5–41)
ANION GAP SERPL CALCULATED.3IONS-SCNC: 6 MMOL/L (ref 9–17)
AST SERPL-CCNC: 60 U/L
BASOPHILS # BLD: 0.07 K/UL (ref 0–0.2)
BASOPHILS NFR BLD: 1 % (ref 0–2)
BILIRUB DIRECT SERPL-MCNC: 0.7 MG/DL
BILIRUB INDIRECT SERPL-MCNC: 0.3 MG/DL (ref 0–1)
BILIRUB SERPL-MCNC: 1 MG/DL (ref 0.3–1.2)
BUN SERPL-MCNC: 35 MG/DL (ref 6–20)
CA-I BLD-SCNC: 1.28 MMOL/L (ref 1.13–1.33)
CALCIUM SERPL-MCNC: 8.4 MG/DL (ref 8.6–10.4)
CHLORIDE SERPL-SCNC: 112 MMOL/L (ref 98–107)
CO2 SERPL-SCNC: 18 MMOL/L (ref 20–31)
CREAT SERPL-MCNC: 1.1 MG/DL (ref 0.7–1.2)
EOSINOPHIL # BLD: 0.96 K/UL (ref 0–0.44)
EOSINOPHILS RELATIVE PERCENT: 9 % (ref 1–4)
ERYTHROCYTE [DISTWIDTH] IN BLOOD BY AUTOMATED COUNT: 18.6 % (ref 11.8–14.4)
FIO2: 40
GFR SERPL CREATININE-BSD FRML MDRD: >60 ML/MIN/1.73M2
GLUCOSE BLD-MCNC: 136 MG/DL (ref 74–100)
GLUCOSE BLD-MCNC: 157 MG/DL (ref 75–110)
GLUCOSE BLD-MCNC: 165 MG/DL (ref 75–110)
GLUCOSE BLD-MCNC: 173 MG/DL (ref 75–110)
GLUCOSE BLD-MCNC: 180 MG/DL (ref 75–110)
GLUCOSE SERPL-MCNC: 168 MG/DL (ref 70–99)
HCT VFR BLD AUTO: 24.1 % (ref 40.7–50.3)
HGB BLD-MCNC: 7.6 G/DL (ref 13–17)
IMM GRANULOCYTES # BLD AUTO: 0.23 K/UL (ref 0–0.3)
IMM GRANULOCYTES NFR BLD: 2 %
LYMPHOCYTES NFR BLD: 0.71 K/UL (ref 1.1–3.7)
LYMPHOCYTES RELATIVE PERCENT: 6 % (ref 24–43)
MAGNESIUM SERPL-MCNC: 2.4 MG/DL (ref 1.6–2.6)
MCH RBC QN AUTO: 29.5 PG (ref 25.2–33.5)
MCHC RBC AUTO-ENTMCNC: 31.5 G/DL (ref 28.4–34.8)
MCV RBC AUTO: 93.4 FL (ref 82.6–102.9)
MONOCYTES NFR BLD: 0.8 K/UL (ref 0.1–1.2)
MONOCYTES NFR BLD: 7 % (ref 3–12)
NEGATIVE BASE EXCESS, ART: 10.7 MMOL/L (ref 0–2)
NEUTROPHILS NFR BLD: 75 % (ref 36–65)
NEUTS SEG NFR BLD: 8.27 K/UL (ref 1.5–8.1)
NRBC BLD-RTO: 0 PER 100 WBC
O2 DELIVERY DEVICE: ABNORMAL
PHOSPHATE SERPL-MCNC: 6.2 MG/DL (ref 2.5–4.5)
PLATELET # BLD AUTO: 155 K/UL (ref 138–453)
PMV BLD AUTO: 9.6 FL (ref 8.1–13.5)
POC HCO3: 15.8 MMOL/L (ref 21–28)
POC LACTIC ACID: 0.6 MMOL/L (ref 0.56–1.39)
POC O2 SATURATION: 99.5 % (ref 94–98)
POC PCO2: 36.9 MM HG (ref 35–48)
POC PH: 7.24 (ref 7.35–7.45)
POC PO2: 189.6 MM HG (ref 83–108)
POTASSIUM SERPL-SCNC: 4.1 MMOL/L (ref 3.7–5.3)
PROT SERPL-MCNC: 5.2 G/DL (ref 6.4–8.3)
RBC # BLD AUTO: 2.58 M/UL (ref 4.21–5.77)
RBC # BLD: ABNORMAL 10*6/UL
SAMPLE SITE: ABNORMAL
SODIUM SERPL-SCNC: 136 MMOL/L (ref 135–144)
WBC OTHER # BLD: 11 K/UL (ref 3.5–11.3)

## 2024-01-19 PROCEDURE — 80048 BASIC METABOLIC PNL TOTAL CA: CPT

## 2024-01-19 PROCEDURE — 71045 X-RAY EXAM CHEST 1 VIEW: CPT

## 2024-01-19 PROCEDURE — 2000000000 HC ICU R&B

## 2024-01-19 PROCEDURE — 82947 ASSAY GLUCOSE BLOOD QUANT: CPT

## 2024-01-19 PROCEDURE — 82803 BLOOD GASES ANY COMBINATION: CPT

## 2024-01-19 PROCEDURE — 6370000000 HC RX 637 (ALT 250 FOR IP)

## 2024-01-19 PROCEDURE — 83735 ASSAY OF MAGNESIUM: CPT

## 2024-01-19 PROCEDURE — 94761 N-INVAS EAR/PLS OXIMETRY MLT: CPT

## 2024-01-19 PROCEDURE — 2580000003 HC RX 258

## 2024-01-19 PROCEDURE — 6360000002 HC RX W HCPCS

## 2024-01-19 PROCEDURE — 85025 COMPLETE CBC W/AUTO DIFF WBC: CPT

## 2024-01-19 PROCEDURE — XW03396 INTRODUCTION OF CEFTOLOZANE/TAZOBACTAM ANTI-INFECTIVE INTO PERIPHERAL VEIN, PERCUTANEOUS APPROACH, NEW TECHNOLOGY GROUP 6: ICD-10-PCS | Performed by: INTERNAL MEDICINE

## 2024-01-19 PROCEDURE — 2700000000 HC OXYGEN THERAPY PER DAY

## 2024-01-19 PROCEDURE — 84100 ASSAY OF PHOSPHORUS: CPT

## 2024-01-19 PROCEDURE — 83605 ASSAY OF LACTIC ACID: CPT

## 2024-01-19 PROCEDURE — 2580000003 HC RX 258: Performed by: INTERNAL MEDICINE

## 2024-01-19 PROCEDURE — 99232 SBSQ HOSP IP/OBS MODERATE 35: CPT | Performed by: INTERNAL MEDICINE

## 2024-01-19 PROCEDURE — 37799 UNLISTED PX VASCULAR SURGERY: CPT

## 2024-01-19 PROCEDURE — 6360000002 HC RX W HCPCS: Performed by: INTERNAL MEDICINE

## 2024-01-19 PROCEDURE — 80076 HEPATIC FUNCTION PANEL: CPT

## 2024-01-19 PROCEDURE — 82330 ASSAY OF CALCIUM: CPT

## 2024-01-19 PROCEDURE — 94003 VENT MGMT INPAT SUBQ DAY: CPT

## 2024-01-19 RX ORDER — LIDOCAINE HYDROCHLORIDE 10 MG/ML
5 INJECTION, SOLUTION INFILTRATION; PERINEURAL ONCE
Status: DISCONTINUED | OUTPATIENT
Start: 2024-01-19 | End: 2024-01-20

## 2024-01-19 RX ORDER — SODIUM CHLORIDE 9 MG/ML
25 INJECTION, SOLUTION INTRAVENOUS PRN
Status: DISCONTINUED | OUTPATIENT
Start: 2024-01-19 | End: 2024-01-22

## 2024-01-19 RX ORDER — SODIUM CHLORIDE 0.9 % (FLUSH) 0.9 %
5-40 SYRINGE (ML) INJECTION PRN
Status: DISCONTINUED | OUTPATIENT
Start: 2024-01-19 | End: 2024-01-31 | Stop reason: HOSPADM

## 2024-01-19 RX ORDER — SODIUM CHLORIDE, SODIUM LACTATE, POTASSIUM CHLORIDE, CALCIUM CHLORIDE 600; 310; 30; 20 MG/100ML; MG/100ML; MG/100ML; MG/100ML
INJECTION, SOLUTION INTRAVENOUS CONTINUOUS
Status: DISCONTINUED | OUTPATIENT
Start: 2024-01-19 | End: 2024-01-19

## 2024-01-19 RX ORDER — SODIUM CHLORIDE 0.9 % (FLUSH) 0.9 %
5-40 SYRINGE (ML) INJECTION EVERY 12 HOURS SCHEDULED
Status: DISCONTINUED | OUTPATIENT
Start: 2024-01-19 | End: 2024-01-21

## 2024-01-19 RX ADMIN — LEVOTHYROXINE SODIUM 200 MCG: 100 TABLET ORAL at 05:47

## 2024-01-19 RX ADMIN — ACETAMINOPHEN 1000 MG: 650 SOLUTION ORAL at 21:04

## 2024-01-19 RX ADMIN — SODIUM CHLORIDE, PRESERVATIVE FREE 10 ML: 5 INJECTION INTRAVENOUS at 21:04

## 2024-01-19 RX ADMIN — MIDODRINE HYDROCHLORIDE 15 MG: 5 TABLET ORAL at 12:42

## 2024-01-19 RX ADMIN — ASPIRIN 81 MG 81 MG: 81 TABLET ORAL at 08:39

## 2024-01-19 RX ADMIN — LANSOPRAZOLE 30 MG: 30 TABLET, ORALLY DISINTEGRATING, DELAYED RELEASE ORAL at 18:05

## 2024-01-19 RX ADMIN — HEPARIN SODIUM 7500 UNITS: 5000 INJECTION INTRAVENOUS; SUBCUTANEOUS at 12:41

## 2024-01-19 RX ADMIN — OXYCODONE HYDROCHLORIDE 10 MG: 5 TABLET ORAL at 12:42

## 2024-01-19 RX ADMIN — ACETAMINOPHEN 1000 MG: 650 SOLUTION ORAL at 04:12

## 2024-01-19 RX ADMIN — OXYCODONE HYDROCHLORIDE 10 MG: 5 TABLET ORAL at 04:12

## 2024-01-19 RX ADMIN — LANSOPRAZOLE 30 MG: 30 TABLET, ORALLY DISINTEGRATING, DELAYED RELEASE ORAL at 05:47

## 2024-01-19 RX ADMIN — CEFTOLOZANE AND TAZOBACTAM 3000 MG: 1; .5 INJECTION, POWDER, LYOPHILIZED, FOR SOLUTION INTRAVENOUS at 14:04

## 2024-01-19 RX ADMIN — OXYCODONE HYDROCHLORIDE 10 MG: 5 TABLET ORAL at 18:04

## 2024-01-19 RX ADMIN — CEFTOLOZANE AND TAZOBACTAM 3000 MG: 1; .5 INJECTION, POWDER, LYOPHILIZED, FOR SOLUTION INTRAVENOUS at 21:01

## 2024-01-19 RX ADMIN — HEPARIN SODIUM 7500 UNITS: 5000 INJECTION INTRAVENOUS; SUBCUTANEOUS at 05:47

## 2024-01-19 RX ADMIN — DOCUSATE SODIUM LIQUID 100 MG: 100 LIQUID ORAL at 21:03

## 2024-01-19 RX ADMIN — HEPARIN SODIUM 7500 UNITS: 5000 INJECTION INTRAVENOUS; SUBCUTANEOUS at 22:23

## 2024-01-19 RX ADMIN — SODIUM CHLORIDE, POTASSIUM CHLORIDE, SODIUM LACTATE AND CALCIUM CHLORIDE: 600; 310; 30; 20 INJECTION, SOLUTION INTRAVENOUS at 06:48

## 2024-01-19 RX ADMIN — AMIODARONE HYDROCHLORIDE 200 MG: 200 TABLET ORAL at 08:40

## 2024-01-19 RX ADMIN — HYDROMORPHONE HYDROCHLORIDE 0.5 MG: 1 INJECTION, SOLUTION INTRAMUSCULAR; INTRAVENOUS; SUBCUTANEOUS at 02:27

## 2024-01-19 RX ADMIN — DOCUSATE SODIUM LIQUID 100 MG: 100 LIQUID ORAL at 08:41

## 2024-01-19 RX ADMIN — MIDODRINE HYDROCHLORIDE 15 MG: 5 TABLET ORAL at 08:38

## 2024-01-19 RX ADMIN — SENNOSIDES 8.8 MG: 8.8 LIQUID ORAL at 08:38

## 2024-01-19 RX ADMIN — SENNOSIDES 8.8 MG: 8.8 LIQUID ORAL at 21:04

## 2024-01-19 RX ADMIN — INSULIN LISPRO 4 UNITS: 100 INJECTION, SOLUTION INTRAVENOUS; SUBCUTANEOUS at 13:00

## 2024-01-19 RX ADMIN — OXYCODONE HYDROCHLORIDE 10 MG: 5 TABLET ORAL at 08:39

## 2024-01-19 RX ADMIN — MEROPENEM 1000 MG: 1 INJECTION, POWDER, FOR SOLUTION INTRAVENOUS at 06:47

## 2024-01-19 RX ADMIN — OXYCODONE HYDROCHLORIDE 10 MG: 5 TABLET ORAL at 21:03

## 2024-01-19 RX ADMIN — ACETAMINOPHEN 1000 MG: 650 SOLUTION ORAL at 12:41

## 2024-01-19 RX ADMIN — MIDODRINE HYDROCHLORIDE 15 MG: 5 TABLET ORAL at 18:04

## 2024-01-19 ASSESSMENT — PAIN SCALES - GENERAL
PAINLEVEL_OUTOF10: 0

## 2024-01-19 ASSESSMENT — PAIN SCALES - WONG BAKER

## 2024-01-19 ASSESSMENT — PULMONARY FUNCTION TESTS
PIF_VALUE: 25
PIF_VALUE: 26
PIF_VALUE: 21
PIF_VALUE: 23
PIF_VALUE: 23
PIF_VALUE: 24
PIF_VALUE: 25
PIF_VALUE: 16
PIF_VALUE: 15
PIF_VALUE: 20
PIF_VALUE: 25
PIF_VALUE: 16

## 2024-01-19 NOTE — PLAN OF CARE
Problem: Discharge Planning  Goal: Discharge to home or other facility with appropriate resources  Outcome: Progressing     Problem: Pain  Goal: Verbalizes/displays adequate comfort level or baseline comfort level  Outcome: Progressing  Flowsheets (Taken 1/18/2024 1200 by Don Dobbs RN)  Verbalizes/displays adequate comfort level or baseline comfort level: Encourage patient to monitor pain and request assistance     Problem: Safety - Adult  Goal: Free from fall injury  Outcome: Progressing     Problem: Respiratory - Adult  Goal: Achieves optimal ventilation and oxygenation  Outcome: Progressing     Problem: Chronic Conditions and Co-morbidities  Goal: Patient's chronic conditions and co-morbidity symptoms are monitored and maintained or improved  Outcome: Progressing     Problem: Skin/Tissue Integrity  Goal: Absence of new skin breakdown  Description: 1.  Monitor for areas of redness and/or skin breakdown  2.  Assess vascular access sites hourly  3.  Every 4-6 hours minimum:  Change oxygen saturation probe site  4.  Every 4-6 hours:  If on nasal continuous positive airway pressure, respiratory therapy assess nares and determine need for appliance change or resting period.  Outcome: Progressing     Problem: ABCDS Injury Assessment  Goal: Absence of physical injury  Outcome: Progressing     Problem: Nutrition Deficit:  Goal: Optimize nutritional status  Outcome: Progressing

## 2024-01-19 NOTE — CARE COORDINATION
TRANSITIONAL CARE PLANNING/ DISCHARGE ONGOING EVALUATION    Hospital Day: 18    Reason for Admission: Atrial fibrillation with RVR ([facility]) [I48.91]  Septic shock ([facility]) [A41.9, R65.21]     Treatment Plan of Care:     Tests/Procedures still needed: Plan to return to OR Sunday 1/21 for poss Integra placement Sunday.     Barriers to Discharge: Plan to return to OR Sunday 1/21 for poss Integra placement Sunday.     Readmission Risk              Risk of Unplanned Readmission:  64            Patient goals/Treatment Preferences/Transitional Plan:     Referrals Made: Carlie Yap    Follow Up needed:

## 2024-01-19 NOTE — PROGRESS NOTES
ICU PROGRESS NOTE        PATIENT NAME: Ruben Dimas  MEDICAL RECORD NO. [id number]  DATE: 1/19/2024    HD: # 18    ASSESSMENT    Patient Active Problem List   Diagnosis    Alcoholic cirrhosis of liver (HCC), sober since 2013    Peripheral edema    Bipolar disorder (HCC)    Type 2 diabetes mellitus with diabetic neuropathy, with long-term current use of insulin (HCC)    Essential hypertension, currently hypotensive    Dyslipidemia    Weakness    Acute metabolic encephalopathy    FABI (obstructive sleep apnea)    Primary osteoarthritis of right knee    Type 2 diabetes mellitus with diabetic neuropathy (HCC)    Acquired hypothyroidism    Urine retention    Anemia    Slow transit constipation    Iron deficiency anemia due to chronic blood loss    Gastroesophageal reflux disease without esophagitis    LEONARDA (acute kidney injury) (HCC)    Secondary esophageal varices without bleeding (HCC)    Portal hypertension (HCC)    Obesity, morbid, BMI 50 or higher (HCC)    Venous stasis dermatitis of both lower extremities    Cellulitis of perineum    Chronic indwelling Clemons catheter    Anxiety and depression    Back pain, chronic    BPH (benign prostatic hyperplasia)    Chronic diastolic CHF (congestive heart failure) (HCC)    Cirrhosis (HCC)    Diabetes mellitus (HCC)    GERD (gastroesophageal reflux disease)    Hepatitis    Hiatal hernia    Hypertension, essential    IBS (irritable bowel syndrome)    Morbid obesity with BMI of 45.0-49.9, adult (HCC)    Neuropathy    Chronic respiratory failure with hypoxia, on home oxygen therapy (HCC)    Sleep apnea    Thyroid disease    Urinary bladder neurogenic dysfunction    Acute UTI    Musculoskeletal immobility    Pyocystis    Myoclonic jerking    Thrombocytopenia (HCC)    Hypotension    Sepsis with acute hypercapnic respiratory failure and septic shock (HCC)    Right lower lobe pneumonia    Acute kidney injury superimposed on CKD (HCC)    Somnolence    Acute respiratory acidosis (HCC)

## 2024-01-19 NOTE — PROGRESS NOTES
[facility] -   Infectious diseases evaluation  admission date 1/1/2024    reason for consultation:   Necrotizing Faciitis    Impression :   Current:  1/1/24 Necrotizing faciitis 2/2 Polymicrobial infection with T2DM and PEG tube dislodgement and infected   LEONARDA on CKD   1/2/24 S/p partial wedge gastrectomy due to infected PEG tube and dislodgement   1/2/24 S/p debridement of abdomen and indwelling mesh removal,  due to concern for necrotizing fasciitis, wound vac in place  Cx abd wall 1/5 kleb x 2 and enterococcus and saccharomyces   1/3/24 GASTROSTOMY TUBE PLACEMENT, REPAIR OF LARGE VENTRAL DEFECT   Alcoholic Liver cirrhosis - found intra op  Elevated CRP  Lactic acidosis- initial 4.1 now 6.2  Bandemia leukemoid reaction- WBC 15 to 38 to 27 -45  Proteus UTI - treated  RLL pseudomonas pneumonia 1/1 - treated  Iv phlebitis -iv removed   1/11 R lung collapse - sp again pseudomonas R meropenem    Other:    Discussion / summary of stay / plan of care   Respiratory culture: pseudomonas multiS 1/1/24 -treated  U cx proteus multiS - treated  Bcx pending - SCN x 1  is a contamination  MRSA swab and COVID pending   cx of the abd wall  of 1/5/24 enterococcus and kleb and saccharomyces  Leukemoid reaction better 16 --27 fluctuating  Zyvox stopped 1/6 due to thrombocytopenia  Avoid fluconazole due to amiodarone  1/7 CT abd suggesting bilat LL atelectasis rather than  pneumonia   micafungin  1/9/24 - stop 1/13  Given due to persistent bandemia,  cover the fungus  1/11 R arm iv phlebitis - added doxy- stop 1/14  RLL pseudomonas pneumonia 1/1 - treated  Iv phlebitis -iv removed   1/11 R lung collapse - sp again pseudomonas but meropenem R - switch to levaquin 1/18 till /24    Recommendations   Treating   large abd wound infection w kleb enterococcus and saccharomyces  Bandemia persistent but ultimately improved  Iv phlebitis pulled and resolved  R lung collapse 1/11 w mucous plug, sp  REPAIR N/A 05/20/2018    REPAIR AND REDUCTION OF RECURRENT INCARCERATED INCISIONAL HERNIA WITH MESH performed by Bijan Camejo MD at [facility] OR       Medications:      lidocaine 1 % injection  5 mL IntraDERmal Once    sodium chloride flush  5-40 mL IntraVENous 2 times per day    ceftolozane-tazobactam (ZERBAXA) 3,000 mg in sodium chloride 0.9 % 100 mL IVPB  3,000 mg IntraVENous Q8H    midodrine  15 mg Oral TID WC    sucralfate  1 g Oral Once    insulin lispro  0-16 Units SubCUTAneous Q4H    amiodarone  200 mg Oral Daily    heparin (porcine)  7,500 Units SubCUTAneous 3 times per day    oxyCODONE  10 mg Oral Q4H    lansoprazole  30 mg Oral BID AC    acetaminophen  1,000 mg Oral 3 times per day    docusate  100 mg Oral BID    senna  5 mL Oral BID    aspirin  81 mg Per NG tube Daily    levothyroxine  200 mcg Per G Tube Daily       Social History:     Social History     Socioeconomic History    Marital status:      Spouse name: Not on file    Number of children: Not on file    Years of education: Not on file    Highest education level: Not on file   Occupational History    Not on file   Tobacco Use    Smoking status: Never    Smokeless tobacco: Never   Vaping Use    Vaping Use: Never used   Substance and Sexual Activity    Alcohol use: No     Comment: quit drinking 2012    Drug use: No    Sexual activity: Not on file   Other Topics Concern    Not on file   Social History Narrative    Not on file     Social Determinants of Health     Financial Resource Strain: Not on file   Food Insecurity: Not on file   Transportation Needs: Not on file   Physical Activity: Not on file   Stress: Not on file   Social Connections: Not on file   Intimate Partner Violence: Not on file   Housing Stability: Not on file       Family History:     Family History   Adopted: Yes   Problem Relation Age of Onset    No Known Problems Mother         Adopted    No Known Problems Father         Adopted      Medical Decision Making:   I have

## 2024-01-19 NOTE — PROGRESS NOTES
Dressing change to RLQ packing. Old ALBIN site and current ALBIN  site ostomy site replaced for collecting drainage. Pt RLQ reddened. Pt states that it is tender to touch. Pt bathed and wounds dressed per orders.

## 2024-01-19 NOTE — PROGRESS NOTES
Comprehensive Nutrition Assessment    Type and Reason for Visit:  Reassess    Nutrition Recommendations/Plan:   Continue TF of Immune Enhancing formula at increased goal rate of 55 mL/hr to better meet estimated nutrition needs.  Will provide 1980 kcals, 124 gm protein.    Monitor TF tolerance/adequacy of intakes.  As tolerated, suggest increasing TF goal rate to 60 mL/hr.  Will monitor labs, weights, and plan of care.     Malnutrition Assessment:  Malnutrition Status:  Insufficient data (01/03/24 1416)    Context:  Acute Illness       Nutrition Assessment:    Pt remains intubated via trach.  RN reports pt continues to tolerate Immune Enhancing TF at goal 50 mL/hr.  Discussed increasing TF goal rate to 55 mL/hr to better meet estimated nutrition needs.  Last BM 1/19.  S/p take back abdominal wound debridement, open wound debridement, and wound vac placement yesterday.  Noted plans for return this weekend for possible wound vac placement.  Weight fluctuations noted - likely due to fluids.  Labs reviewed: BUN 35 mg/dL, Phos 6.2 mg/dL, Glucose 136-168 mg/dL.  Meds    Nutrition Related Findings:    labs/meds reviewed.  +2 facial edema.  last BM 1/19.   Wound Type: Surgical Incision, Wound Vac (to abdomen)       Current Nutrition Intake & Therapies:    Average Meal Intake: NPO  Average Supplements Intake: NPO  Diet NPO Exceptions are: Sips of Water with Meds  ADULT TUBE FEEDING; Nasogastric; Immune Enhancing; Continuous; 50; No; 30; Q 4 hours  Current Tube Feeding (TF) Orders:  Feeding Route: PEG  Formula: Immune Enhancing  Schedule: Continuous  Feeding Regimen: 50 mL/hr  Additives/Modulars: None  Water Flushes: 30 mL q 4 hrs  Current TF & Flush Orders Provides: At 50 mL/hr = 1800 kcals, 113 gm protein  Goal TF & Flush Orders Provides: Immune Enhancing goal 55 mL/hr = 1980 kcals, 124 gm protein.  Sugggest eventual goal rate 60 mL/hr = 2160 kcals, 135 gm protein.    Additional Calorie Sources:  None    Anthropometric

## 2024-01-19 NOTE — PLAN OF CARE
Problem: Respiratory - Adult  Goal: Achieves optimal ventilation and oxygenation  1/19/2024 0844 by Zachariah Crook, AGNIESZKA  Outcome: Progressing

## 2024-01-19 NOTE — PROGRESS NOTES
01/19/24 1158   Patient Observation   SpO2 100 %   Vent Information   Vent Mode (S)  CPAP/PS   Ventilator Settings   FiO2  40 %   PEEP/CPAP (cmH2O) 8   Pressure Support (cm H2O) (S)  8 cm H2O   Vent Patient Data (Readings)   Vt (Measured) 775 mL   Rate Measured 11 br/min         Ventilator weaning trial attempted.

## 2024-01-20 ENCOUNTER — APPOINTMENT (OUTPATIENT)
Dept: GENERAL RADIOLOGY | Age: 59
DRG: 853 | End: 2024-01-20
Payer: MEDICARE

## 2024-01-20 ENCOUNTER — APPOINTMENT (OUTPATIENT)
Dept: ULTRASOUND IMAGING | Age: 59
DRG: 853 | End: 2024-01-20
Payer: MEDICARE

## 2024-01-20 LAB
ALBUMIN SERPL-MCNC: 1.9 G/DL (ref 3.5–5.2)
ALBUMIN/GLOB SERPL: 1 {RATIO} (ref 1–2.5)
ALP SERPL-CCNC: 1027 U/L (ref 40–129)
ALT SERPL-CCNC: 32 U/L (ref 10–50)
ANION GAP SERPL CALCULATED.3IONS-SCNC: 10 MMOL/L (ref 9–16)
AST SERPL-CCNC: 55 U/L (ref 10–50)
BASOPHILS # BLD: 0.06 K/UL (ref 0–0.2)
BASOPHILS NFR BLD: 1 % (ref 0–2)
BILIRUB DIRECT SERPL-MCNC: 0.7 MG/DL (ref 0–0.3)
BILIRUB INDIRECT SERPL-MCNC: 0.3 MG/DL (ref 0–1)
BILIRUB SERPL-MCNC: 0.9 MG/DL (ref 0–1.2)
BUN SERPL-MCNC: 39 MG/DL (ref 6–20)
CA-I BLD-SCNC: 1.29 MMOL/L (ref 1.13–1.33)
CALCIUM SERPL-MCNC: 8.4 MG/DL (ref 8.6–10.4)
CHLORIDE SERPL-SCNC: 115 MMOL/L (ref 98–107)
CO2 SERPL-SCNC: 16 MMOL/L (ref 20–31)
CREAT SERPL-MCNC: 1.1 MG/DL (ref 0.7–1.2)
EOSINOPHIL # BLD: 1.08 K/UL (ref 0–0.44)
EOSINOPHILS RELATIVE PERCENT: 12 % (ref 1–4)
ERYTHROCYTE [DISTWIDTH] IN BLOOD BY AUTOMATED COUNT: 18.7 % (ref 11.8–14.4)
FIO2: 40
GFR SERPL CREATININE-BSD FRML MDRD: >60 ML/MIN/1.73M2
GGT SERPL-CCNC: 331 U/L (ref 8–61)
GLOBULIN SER CALC-MCNC: 3.3 G/DL
GLUCOSE BLD-MCNC: 134 MG/DL (ref 74–100)
GLUCOSE BLD-MCNC: 138 MG/DL (ref 75–110)
GLUCOSE BLD-MCNC: 139 MG/DL (ref 75–110)
GLUCOSE BLD-MCNC: 142 MG/DL (ref 75–110)
GLUCOSE BLD-MCNC: 148 MG/DL (ref 75–110)
GLUCOSE BLD-MCNC: 154 MG/DL (ref 75–110)
GLUCOSE BLD-MCNC: 156 MG/DL (ref 75–110)
GLUCOSE BLD-MCNC: 159 MG/DL (ref 75–110)
GLUCOSE SERPL-MCNC: 161 MG/DL (ref 74–99)
HCT VFR BLD AUTO: 23 % (ref 40.7–50.3)
HGB BLD-MCNC: 7.2 G/DL (ref 13–17)
IMM GRANULOCYTES # BLD AUTO: 0.19 K/UL (ref 0–0.3)
IMM GRANULOCYTES NFR BLD: 2 %
LYMPHOCYTES NFR BLD: 1.11 K/UL (ref 1.1–3.7)
LYMPHOCYTES RELATIVE PERCENT: 12 % (ref 24–43)
MAGNESIUM SERPL-MCNC: 2.5 MG/DL (ref 1.6–2.6)
MCH RBC QN AUTO: 29.6 PG (ref 25.2–33.5)
MCHC RBC AUTO-ENTMCNC: 31.3 G/DL (ref 28.4–34.8)
MCV RBC AUTO: 94.7 FL (ref 82.6–102.9)
MICROORGANISM SPEC CULT: ABNORMAL
MICROORGANISM SPEC CULT: ABNORMAL
MICROORGANISM/AGENT SPEC: ABNORMAL
MONOCYTES NFR BLD: 0.89 K/UL (ref 0.1–1.2)
MONOCYTES NFR BLD: 10 % (ref 3–12)
NEGATIVE BASE EXCESS, ART: 10.2 MMOL/L (ref 0–2)
NEUTROPHILS NFR BLD: 64 % (ref 36–65)
NEUTS SEG NFR BLD: 5.97 K/UL (ref 1.5–8.1)
NRBC BLD-RTO: 0 PER 100 WBC
PHOSPHATE SERPL-MCNC: 5.6 MG/DL (ref 2.5–4.5)
PLATELET # BLD AUTO: 145 K/UL (ref 138–453)
PMV BLD AUTO: 9.9 FL (ref 8.1–13.5)
POC HCO3: 15.5 MMOL/L (ref 21–28)
POC LACTIC ACID: <0.3 MMOL/L (ref 0.56–1.39)
POC O2 SATURATION: 99.5 % (ref 94–98)
POC PCO2: 32.4 MM HG (ref 35–48)
POC PH: 7.29 (ref 7.35–7.45)
POC PO2: 184.9 MM HG (ref 83–108)
POTASSIUM SERPL-SCNC: 4.1 MMOL/L (ref 3.7–5.3)
PROT SERPL-MCNC: 5.2 G/DL (ref 6.6–8.7)
RBC # BLD AUTO: 2.43 M/UL (ref 4.21–5.77)
RBC # BLD: ABNORMAL 10*6/UL
SODIUM SERPL-SCNC: 141 MMOL/L (ref 136–145)
SPECIMEN DESCRIPTION: ABNORMAL
WBC OTHER # BLD: 9.3 K/UL (ref 3.5–11.3)

## 2024-01-20 PROCEDURE — 94761 N-INVAS EAR/PLS OXIMETRY MLT: CPT

## 2024-01-20 PROCEDURE — 82947 ASSAY GLUCOSE BLOOD QUANT: CPT

## 2024-01-20 PROCEDURE — 6370000000 HC RX 637 (ALT 250 FOR IP)

## 2024-01-20 PROCEDURE — 2700000000 HC OXYGEN THERAPY PER DAY

## 2024-01-20 PROCEDURE — 82330 ASSAY OF CALCIUM: CPT

## 2024-01-20 PROCEDURE — 97535 SELF CARE MNGMENT TRAINING: CPT

## 2024-01-20 PROCEDURE — 2000000000 HC ICU R&B

## 2024-01-20 PROCEDURE — 80048 BASIC METABOLIC PNL TOTAL CA: CPT

## 2024-01-20 PROCEDURE — 82977 ASSAY OF GGT: CPT

## 2024-01-20 PROCEDURE — 36415 COLL VENOUS BLD VENIPUNCTURE: CPT

## 2024-01-20 PROCEDURE — 97530 THERAPEUTIC ACTIVITIES: CPT

## 2024-01-20 PROCEDURE — 82803 BLOOD GASES ANY COMBINATION: CPT

## 2024-01-20 PROCEDURE — 6360000002 HC RX W HCPCS

## 2024-01-20 PROCEDURE — 36600 WITHDRAWAL OF ARTERIAL BLOOD: CPT

## 2024-01-20 PROCEDURE — 71045 X-RAY EXAM CHEST 1 VIEW: CPT

## 2024-01-20 PROCEDURE — 83605 ASSAY OF LACTIC ACID: CPT

## 2024-01-20 PROCEDURE — 83735 ASSAY OF MAGNESIUM: CPT

## 2024-01-20 PROCEDURE — 2580000003 HC RX 258

## 2024-01-20 PROCEDURE — 6360000002 HC RX W HCPCS: Performed by: INTERNAL MEDICINE

## 2024-01-20 PROCEDURE — 85025 COMPLETE CBC W/AUTO DIFF WBC: CPT

## 2024-01-20 PROCEDURE — 97162 PT EVAL MOD COMPLEX 30 MIN: CPT

## 2024-01-20 PROCEDURE — 84100 ASSAY OF PHOSPHORUS: CPT

## 2024-01-20 PROCEDURE — 76705 ECHO EXAM OF ABDOMEN: CPT

## 2024-01-20 PROCEDURE — 2580000003 HC RX 258: Performed by: INTERNAL MEDICINE

## 2024-01-20 PROCEDURE — 97167 OT EVAL HIGH COMPLEX 60 MIN: CPT

## 2024-01-20 PROCEDURE — 80076 HEPATIC FUNCTION PANEL: CPT

## 2024-01-20 PROCEDURE — 94003 VENT MGMT INPAT SUBQ DAY: CPT

## 2024-01-20 PROCEDURE — 99233 SBSQ HOSP IP/OBS HIGH 50: CPT | Performed by: INTERNAL MEDICINE

## 2024-01-20 RX ORDER — LIDOCAINE HYDROCHLORIDE 10 MG/ML
5 INJECTION, SOLUTION INFILTRATION; PERINEURAL ONCE
Status: DISCONTINUED | OUTPATIENT
Start: 2024-01-20 | End: 2024-01-20

## 2024-01-20 RX ORDER — SODIUM CHLORIDE 9 MG/ML
25 INJECTION, SOLUTION INTRAVENOUS PRN
Status: DISCONTINUED | OUTPATIENT
Start: 2024-01-20 | End: 2024-01-20

## 2024-01-20 RX ORDER — SODIUM CHLORIDE 0.9 % (FLUSH) 0.9 %
5-40 SYRINGE (ML) INJECTION EVERY 12 HOURS SCHEDULED
Status: DISCONTINUED | OUTPATIENT
Start: 2024-01-20 | End: 2024-01-22

## 2024-01-20 RX ORDER — SODIUM CHLORIDE 0.9 % (FLUSH) 0.9 %
5-40 SYRINGE (ML) INJECTION PRN
Status: DISCONTINUED | OUTPATIENT
Start: 2024-01-20 | End: 2024-01-20

## 2024-01-20 RX ADMIN — LANSOPRAZOLE 30 MG: 30 TABLET, ORALLY DISINTEGRATING, DELAYED RELEASE ORAL at 10:10

## 2024-01-20 RX ADMIN — CEFTOLOZANE AND TAZOBACTAM 3000 MG: 1; .5 INJECTION, POWDER, LYOPHILIZED, FOR SOLUTION INTRAVENOUS at 21:02

## 2024-01-20 RX ADMIN — SODIUM CHLORIDE, PRESERVATIVE FREE 10 ML: 5 INJECTION INTRAVENOUS at 10:11

## 2024-01-20 RX ADMIN — SODIUM CHLORIDE, PRESERVATIVE FREE 10 ML: 5 INJECTION INTRAVENOUS at 19:18

## 2024-01-20 RX ADMIN — MIDODRINE HYDROCHLORIDE 15 MG: 5 TABLET ORAL at 12:18

## 2024-01-20 RX ADMIN — SODIUM CHLORIDE, PRESERVATIVE FREE 20 ML: 5 INJECTION INTRAVENOUS at 21:04

## 2024-01-20 RX ADMIN — HEPARIN SODIUM 7500 UNITS: 5000 INJECTION INTRAVENOUS; SUBCUTANEOUS at 14:06

## 2024-01-20 RX ADMIN — CEFTOLOZANE AND TAZOBACTAM 3000 MG: 1; .5 INJECTION, POWDER, LYOPHILIZED, FOR SOLUTION INTRAVENOUS at 14:08

## 2024-01-20 RX ADMIN — SENNOSIDES 8.8 MG: 8.8 LIQUID ORAL at 10:09

## 2024-01-20 RX ADMIN — ASPIRIN 81 MG 81 MG: 81 TABLET ORAL at 10:10

## 2024-01-20 RX ADMIN — HYDROMORPHONE HYDROCHLORIDE 0.5 MG: 1 INJECTION, SOLUTION INTRAMUSCULAR; INTRAVENOUS; SUBCUTANEOUS at 13:23

## 2024-01-20 RX ADMIN — MIDODRINE HYDROCHLORIDE 15 MG: 5 TABLET ORAL at 17:35

## 2024-01-20 RX ADMIN — AMIODARONE HYDROCHLORIDE 200 MG: 200 TABLET ORAL at 10:09

## 2024-01-20 RX ADMIN — OXYCODONE HYDROCHLORIDE 10 MG: 5 TABLET ORAL at 21:01

## 2024-01-20 RX ADMIN — ACETAMINOPHEN 1000 MG: 650 SOLUTION ORAL at 04:56

## 2024-01-20 RX ADMIN — HEPARIN SODIUM 7500 UNITS: 5000 INJECTION INTRAVENOUS; SUBCUTANEOUS at 22:08

## 2024-01-20 RX ADMIN — ACETAMINOPHEN 1000 MG: 650 SOLUTION ORAL at 21:01

## 2024-01-20 RX ADMIN — ALPRAZOLAM 0.25 MG: 0.25 TABLET ORAL at 23:41

## 2024-01-20 RX ADMIN — OXYCODONE HYDROCHLORIDE 10 MG: 5 TABLET ORAL at 00:51

## 2024-01-20 RX ADMIN — OXYCODONE HYDROCHLORIDE 10 MG: 5 TABLET ORAL at 23:41

## 2024-01-20 RX ADMIN — OXYCODONE HYDROCHLORIDE 10 MG: 5 TABLET ORAL at 12:19

## 2024-01-20 RX ADMIN — LEVOTHYROXINE SODIUM 200 MCG: 100 TABLET ORAL at 10:10

## 2024-01-20 RX ADMIN — HEPARIN SODIUM 7500 UNITS: 5000 INJECTION INTRAVENOUS; SUBCUTANEOUS at 06:26

## 2024-01-20 RX ADMIN — CEFTOLOZANE AND TAZOBACTAM 3000 MG: 1; .5 INJECTION, POWDER, LYOPHILIZED, FOR SOLUTION INTRAVENOUS at 04:55

## 2024-01-20 RX ADMIN — ACETAMINOPHEN 1000 MG: 650 SOLUTION ORAL at 15:57

## 2024-01-20 RX ADMIN — OXYCODONE HYDROCHLORIDE 10 MG: 5 TABLET ORAL at 10:10

## 2024-01-20 RX ADMIN — DOCUSATE SODIUM LIQUID 100 MG: 100 LIQUID ORAL at 10:09

## 2024-01-20 RX ADMIN — LANSOPRAZOLE 30 MG: 30 TABLET, ORALLY DISINTEGRATING, DELAYED RELEASE ORAL at 15:57

## 2024-01-20 RX ADMIN — HYDROMORPHONE HYDROCHLORIDE 0.5 MG: 1 INJECTION, SOLUTION INTRAMUSCULAR; INTRAVENOUS; SUBCUTANEOUS at 07:47

## 2024-01-20 RX ADMIN — HYDROMORPHONE HYDROCHLORIDE 0.5 MG: 1 INJECTION, SOLUTION INTRAMUSCULAR; INTRAVENOUS; SUBCUTANEOUS at 19:17

## 2024-01-20 RX ADMIN — MIDODRINE HYDROCHLORIDE 15 MG: 5 TABLET ORAL at 10:10

## 2024-01-20 RX ADMIN — OXYCODONE HYDROCHLORIDE 10 MG: 5 TABLET ORAL at 17:35

## 2024-01-20 RX ADMIN — OXYCODONE HYDROCHLORIDE 10 MG: 5 TABLET ORAL at 04:56

## 2024-01-20 ASSESSMENT — PULMONARY FUNCTION TESTS
PIF_VALUE: 22
PIF_VALUE: 27
PIF_VALUE: 16
PIF_VALUE: 25
PIF_VALUE: 16
PIF_VALUE: 20
PIF_VALUE: 18

## 2024-01-20 ASSESSMENT — PAIN DESCRIPTION - ORIENTATION: ORIENTATION: POSTERIOR

## 2024-01-20 ASSESSMENT — PAIN DESCRIPTION - LOCATION: LOCATION: ABDOMEN;BACK

## 2024-01-20 ASSESSMENT — PAIN DESCRIPTION - DESCRIPTORS: DESCRIPTORS: ACHING;DISCOMFORT

## 2024-01-20 NOTE — PROGRESS NOTES
ICU PROGRESS NOTE        PATIENT NAME: Ruben Dimas  MEDICAL RECORD NO. [id number]  DATE: 1/20/2024    HD: # 19    ASSESSMENT    Patient Active Problem List   Diagnosis    Alcoholic cirrhosis of liver (HCC), sober since 2013    Peripheral edema    Bipolar disorder (HCC)    Type 2 diabetes mellitus with diabetic neuropathy, with long-term current use of insulin (HCC)    Essential hypertension, currently hypotensive    Dyslipidemia    Weakness    Acute metabolic encephalopathy    FABI (obstructive sleep apnea)    Primary osteoarthritis of right knee    Type 2 diabetes mellitus with diabetic neuropathy (HCC)    Acquired hypothyroidism    Urine retention    Anemia    Slow transit constipation    Iron deficiency anemia due to chronic blood loss    Gastroesophageal reflux disease without esophagitis    LENOARDA (acute kidney injury) (HCC)    Secondary esophageal varices without bleeding (HCC)    Portal hypertension (HCC)    Obesity, morbid, BMI 50 or higher (HCC)    Venous stasis dermatitis of both lower extremities    Cellulitis of perineum    Chronic indwelling Clemons catheter    Anxiety and depression    Back pain, chronic    BPH (benign prostatic hyperplasia)    Chronic diastolic CHF (congestive heart failure) (HCC)    Cirrhosis (HCC)    Diabetes mellitus (HCC)    GERD (gastroesophageal reflux disease)    Hepatitis    Hiatal hernia    Hypertension, essential    IBS (irritable bowel syndrome)    Morbid obesity with BMI of 45.0-49.9, adult (HCC)    Neuropathy    Chronic respiratory failure with hypoxia, on home oxygen therapy (HCC)    Sleep apnea    Thyroid disease    Urinary bladder neurogenic dysfunction    Acute UTI    Musculoskeletal immobility    Pyocystis    Myoclonic jerking    Thrombocytopenia (HCC)    Hypotension    Sepsis with acute hypercapnic respiratory failure and septic shock (HCC)    Right lower lobe pneumonia    Acute kidney injury superimposed on CKD (HCC)    Somnolence    Acute respiratory acidosis (HCC)         01/18/24  0505 01/19/24  0433 01/20/24  0417   WBC 8.7 11.0 9.3   HGB 7.8* 7.6* 7.2*   HCT 24.6* 24.1* 23.0*   MCV 92.5 93.4 94.7    155 145       BMP:   Recent Labs     01/18/24  0505 01/19/24  0433 01/20/24  0417    136 141   K 4.1 4.1 4.1   * 112* 115*   CO2 16* 18* 16*   BUN 33* 35* 39*   CREATININE 1.0 1.1 1.1   GLUCOSE 99 168* 161*           RADIOLOGY:  XR CHEST PORTABLE    Result Date: 1/19/2024  Interval improvement/partial clearing of the right basilar airspace disease.        Cat Yoo DO  1/20/2024, 6:31 AM

## 2024-01-20 NOTE — PLAN OF CARE
Problem: Discharge Planning  Goal: Discharge to home or other facility with appropriate resources  Outcome: Progressing  Flowsheets (Taken 1/19/2024 2000)  Discharge to home or other facility with appropriate resources: Identify barriers to discharge with patient and caregiver     Problem: Pain  Goal: Verbalizes/displays adequate comfort level or baseline comfort level  Outcome: Progressing  Flowsheets  Taken 1/20/2024 0400  Verbalizes/displays adequate comfort level or baseline comfort level: Assess pain using appropriate pain scale  Taken 1/20/2024 0000  Verbalizes/displays adequate comfort level or baseline comfort level: Assess pain using appropriate pain scale  Taken 1/19/2024 2000  Verbalizes/displays adequate comfort level or baseline comfort level: Assess pain using appropriate pain scale     Problem: Safety - Adult  Goal: Free from fall injury  Outcome: Progressing  Flowsheets (Taken 1/20/2024 0043)  Free From Fall Injury: Based on caregiver fall risk screen, instruct family/caregiver to ask for assistance with transferring infant if caregiver noted to have fall risk factors     Problem: Respiratory - Adult  Goal: Achieves optimal ventilation and oxygenation  1/20/2024 0442 by Luis Tafoya RN  Outcome: Progressing  1/19/2024 2019 by Esther Crowe RCP  Flowsheets (Taken 1/19/2024 2019)  Achieves optimal ventilation and oxygenation:   Assess the need for suctioning and aspirate as needed   Respiratory therapy support as indicated   Assess for changes in respiratory status   Oxygen supplementation based on oxygen saturation or arterial blood gases   Assess for changes in mentation and behavior   Assess and instruct to report shortness of breath or any respiratory difficulty   Encourage broncho-pulmonary hygiene including cough, deep breathe, incentive spirometry     Problem: Chronic Conditions and Co-morbidities  Goal: Patient's chronic conditions and co-morbidity symptoms are monitored and

## 2024-01-20 NOTE — PLAN OF CARE
Problem: Respiratory - Adult  Goal: Achieves optimal ventilation and oxygenation  1/19/2024 2019 by Esther Crowe RCP  Flowsheets (Taken 1/19/2024 2019)  Achieves optimal ventilation and oxygenation:   Assess the need for suctioning and aspirate as needed   Respiratory therapy support as indicated   Assess for changes in respiratory status   Oxygen supplementation based on oxygen saturation or arterial blood gases   Assess for changes in mentation and behavior   Assess and instruct to report shortness of breath or any respiratory difficulty   Encourage broncho-pulmonary hygiene including cough, deep breathe, incentive spirometry

## 2024-01-20 NOTE — PROGRESS NOTES
Physical Therapy  Facility/Department: [facility] CAR 1- SICU  Physical Therapy Initial Assessment    Name: Ruben Dimas  : 1965  MRN: [id number]  Date of Service: 2024    Discharge Recommendations: Further therapy recommended at discharge.  Chief Complaint   Patient presents with    Chest Pain    Shortness of Breath     The patient is a 58 y.o.  male who is admitted to the hospital from extended-care facility in by EMS.  He was residing after discharge from outlying facility on 2023 for acute hypoxic respiratory failure with septic shock and had PEG/trach placed.  Patient was complaining of shortness of breath and chest pain.  EMS found him with desatting to 70s and was diaphoretic.  Initial EKG showed concern for ST elevation with RBBB and A-fib with RVR.  Patient was given Cardizem and brought to ED.      PT Equipment Recommendations  Equipment Needed: No      Patient Diagnosis(es): The primary encounter diagnosis was Septic shock (HCC). Diagnoses of Atrial fibrillation with RVR (HCC), Dislodged gastrostomy tube, Necrotizing soft tissue infection, PAF (paroxysmal atrial fibrillation) (HCC), and Necrotizing fasciitis (HCC) were also pertinent to this visit.  Past Medical History:  has a past medical history of A-fib (HCC), Anxiety and depression, Back pain, chronic, BPH (benign prostatic hyperplasia), Cellulitis of left lower extremity, Cellulitis of right lower extremity, CHF (congestive heart failure) (HCC), Chronic respiratory failure with hypoxia (HCC), Cirrhosis (HCC), Diabetes mellitus (HCC), Epididymoorchitis, GERD (gastroesophageal reflux disease), H/O cardiac catheterization, Hepatitis, Hiatal hernia, History of alcohol abuse, Hyperlipidemia, Hypertension, IBS (irritable bowel syndrome), Incisional hernia with obstruction but no gangrene, Klebsiella sepsis (HCC), Metabolic encephalopathy, Morbid obesity (HCC), Neuropathy, On home oxygen therapy, On home oxygen therapy, Pancreatitis, Poisoning by

## 2024-01-20 NOTE — PROGRESS NOTES
Occupational Therapy  Facility/Department: [facility] CAR 1- SICU  Occupational Therapy Initial Assessment    Name: Ruben Dimas  : 1965  MRN: [id number]  Date of Service: 2024  Chief Complaint   Patient presents with    Chest Pain    Shortness of Breath       Discharge Recommendations:  Patient would benefit from continued therapy after discharge  OT Equipment Recommendations  Equipment Needed:  (CTA)       Patient Diagnosis(es): The primary encounter diagnosis was Septic shock (HCC). Diagnoses of Atrial fibrillation with RVR (HCC), Dislodged gastrostomy tube, Necrotizing soft tissue infection, PAF (paroxysmal atrial fibrillation) (HCC), and Necrotizing fasciitis (HCC) were also pertinent to this visit.  Past Medical History:  has a past medical history of A-fib (HCC), Anxiety and depression, Back pain, chronic, BPH (benign prostatic hyperplasia), Cellulitis of left lower extremity, Cellulitis of right lower extremity, CHF (congestive heart failure) (HCC), Chronic respiratory failure with hypoxia (HCC), Cirrhosis (HCC), Diabetes mellitus (HCC), Epididymoorchitis, GERD (gastroesophageal reflux disease), H/O cardiac catheterization, Hepatitis, Hiatal hernia, History of alcohol abuse, Hyperlipidemia, Hypertension, IBS (irritable bowel syndrome), Incisional hernia with obstruction but no gangrene, Klebsiella sepsis (HCC), Metabolic encephalopathy, Morbid obesity (HCC), Neuropathy, On home oxygen therapy, On home oxygen therapy, Pancreatitis, Poisoning by insulin and oral hypoglycemic (antidiabetic) drugs, accidental (unintentional), initial encounter, Primary osteoarthritis of right knee, Retention, urine, SBO (small bowel obstruction) (HCC), Secondary hypercoagulable state (HCC), Septic shock (HCC), Sleep apnea, Sleep apnea, obstructive, Thyroid disease, Under care of team, Urinary bladder neurogenic dysfunction, and Urinary tract infection associated with indwelling urethral catheter (HCC).  Past Surgical History:  Pt also reports he has an aid that comes to home every day for 2 hrs each day. pt has been at [facility] and then SNF prior to admission to this hospital. Information above per pt       Objective   SpO2: 100 %  O2 Device: Ventilator  Temp: 98.4 °F (36.9 °C)             Safety Devices  Type of Devices: All fall risk precautions in place;Call light within reach;Nurse notified;Left in bed  Restraints  Restraints Initially in Place: No  Bed Mobility Training  Bed Mobility Training: Yes  Overall Level of Assistance: Maximum assistance;Assist X2 (Pt with 1 attempt at SUP>SIT with MAX A x2, unable to complete transfer d/t pain this date.)  Rolling: Maximum assistance;Assist X1 (rolling to R side)  Supine to Sit: Maximum assistance;Assist X2  Sit to Supine: Maximum assistance;Assist X2  Balance  Sitting: Impaired (Pt attempting SUP>SIT transfer with MAX A x2, unable to fully complete d/t pain and generalized weakness)  Standing:  (unsafe to attempt this date. Pt reporting use of lift to w/c prior to admission)  Transfer Training  Transfer Training: No (unsafe to attempt, pt reporting use of lift to w/c prior to admission)  Gait  Gait Training: No (unsafe to attempt, pt reporting use of lift to w/c prior to admission)     AROM: Within functional limits  Strength: Generally decreased, functional (BUE's grossly 4/5)  Coordination: Within functional limits (slowed)  Tone: Normal  Sensation: Impaired (Pt reporting numbness/tingling in BLEs from waist to feet this date.)  ADL  Feeding: Minimal assistance;Increased time to complete;Setup  Feeding Skilled Clinical Factors: Pt supine in bed demo'd eating ice chips with MIN A occasionally for utensil stability with R hand and hand over hand technique.  Grooming: Minimal assistance;Setup;Increased time to complete  UE Bathing: Setup;Increased time to complete;Moderate assistance  LE Bathing: Maximum assistance;Setup;Increased time to complete  UE Dressing: Setup;Increased time to

## 2024-01-20 NOTE — PROGRESS NOTES
[facility] -   Infectious diseases evaluation  admission date 1/1/2024    reason for consultation:   Necrotizing Faciitis    Impression :   Current:  1/1/24 Necrotizing faciitis 2/2 Polymicrobial infection with T2DM and PEG tube dislodgement and infection  LEONARDA on CKD   1/2/24 S/p partial wedge gastrectomy due to infected PEG tube and dislodgement   1/2/24 S/p debridement of abdomen and indwelling mesh removal,  due to concern for necrotizing fasciitis, wound vac in place  Cx abd wall 1/5 kleb x 2 and enterococcus and saccharomyces   1/3/24 GASTROSTOMY TUBE PLACEMENT, REPAIR OF LARGE VENTRAL DEFECT   Alcoholic Liver cirrhosis - found intra op  Elevated CRP  Lactic acidosis- initial 4.1 now 6.2  Bandemia leukemoid reaction- WBC 15 to 38 to 27 -45  Proteus UTI - treated  RLL pseudomonas pneumonia 1/1 - treated  Iv phlebitis -iv removed   1/11 R lung collapse - sp with persistent Pseudomonas R meropenem      Discussion / summary of stay / plan of care   Respiratory culture: pseudomonas multiS 1/1/24 -treated  U cx proteus multiS - treated  Bcx pending - SCN x 1  is a contamination  MRSA swab and COVID pending   cx of the abd wall  of 1/5/24 enterococcus and kleb and saccharomyces  Leukemoid reaction better 16 --27 fluctuating  Zyvox stopped 1/6 due to thrombocytopenia  Avoid fluconazole due to amiodarone  1/7 CT abd suggesting bilat LL atelectasis rather than  pneumonia   micafungin  1/9/24 - stop 1/13  Given due to persistent bandemia,  cover the fungus  1/11 R arm iv phlebitis - added doxy- stop 1/14  RLL pseudomonas pneumonia 1/1 - treated  Iv phlebitis -iv removed   1/11 R lung collapse - sp again pseudomonas but meropenem R - switch to levaquin 1/18 till /24    Recommendations   Treating   Large abd wound infection w kleb enterococcus and saccharomyces  Persistent bandemia but ultimately improving  Iv phlebitis pulled line and resolved  R lung collapse 1/11 w mucous  WOUND BED PREPARATION, WOUND VAC PLACEMENT  (MISONIX) performed by Fazal Wood MD at [facility] OR    COLONOSCOPY      2012    COLONOSCOPY N/A 04/19/2023    COLONOSCOPY DIAGNOSTIC performed by Dorian Rendon MD at [facility] OR    COLONOSCOPY  04/20/2023    COLONOSCOPY N/A 4/20/2023    COLONOSCOPY DIAGNOSTIC performed by Dorian Rendon MD at [facility] OR    CYSTOSCOPY  01/24/2018    CYSTOSCOPY  02/20/2019    CYSTOSCOPY N/A 02/20/2019    CYSTOSCOPY DILATION performed by Fazal Guallpa MD at [facility] OR    CYSTOSCOPY N/A 08/23/2019    CYSTOSCOPY/ DILATION NICOLE CATHETER EXCHANGE performed by Fazal Guallpa MD at [facility] OR    CYSTOSCOPY N/A 06/08/2022    CYSTOSCOPY, REMOVAL OF SMALL BLADDER STONE AND BLADDER IRRIGATION performed by Fazal Guallpa MD at [facility] OR    ENDOSCOPY, COLON, DIAGNOSTIC      HERNIA REPAIR      INCISIONAL HERNIA REPAIR  05/20/2018    repair and reduction of recurrent incarcerated incisional hernia with mesh    LAPAROTOMY N/A 1/2/2024    LAPAROTOMY EXPLORATORY, DEBRIDEMENT OF SUBQ TISSUE INCLUDING FASCIA 50X44, APPLICATION OF WOUND VAC >50 SQ CM, PARTIAL GASTRECTOMY, EXTENSIVE LYSIS OF ADHESIONS performed by Escobar Ulloa MD at [facility] OR    LAPAROTOMY N/A 1/3/2024    REOPEN LAPAROTOMY, WOUND VAC PLACEMENT, OPEN GASTROSTOMY TUBE PLACEMENT, REPAIR OF LARGE VENTRAL DEFECT WITH DEOVN MATRIX, DEBRIDEMENT OF SUBQUTANEOUS TISSUE AND MUSCLE, ALBIN DRAINS X2 performed by Mary Carmen Monte MD at [facility] OR    NH CYSTOURETHROSCOPY N/A 01/24/2018    CYSTOSCOPY /DILATION performed by Fazal Guallpa MD at [facility] OR    NH EGD TRANSORAL BIOPSY SINGLE/MULTIPLE N/A 07/19/2017    EGD BIOPSY performed by Mike Gu MD at [facility] OR    NH I&D BELOW FASCIA FOOT 1 BURSAL SPACE Bilateral 08/22/2017    FOOT DEBRIDEMENT INCISION AND DRAINAGE with bone bx performed by Porfirio Hernandez DPM at [facility] OR    TRACHEOSTOMY N/A 11/8/2023    TRACHEOTOMY performed by Mary Carmen Monte MD at [facility] OR    UPPER GASTROINTESTINAL ENDOSCOPY

## 2024-01-20 NOTE — PROGRESS NOTES
01/20/24 1155   Patient Observation   SpO2 100 %   Vent Information   Vent Mode (S)  CPAP/PS   Ventilator Settings   FiO2  40 %   PEEP/CPAP (cmH2O) 8   Pressure Support (cm H2O) (S)  8 cm H2O   Vent Patient Data (Readings)   Vt (Measured) 737 mL   Rate Measured 9 br/min         Ventilator weaning trial attempted.

## 2024-01-20 NOTE — PLAN OF CARE
Problem: Discharge Planning  Goal: Discharge to home or other facility with appropriate resources  Outcome: Progressing  Flowsheets (Taken 1/20/2024 0800)  Discharge to home or other facility with appropriate resources:   Identify barriers to discharge with patient and caregiver   Identify discharge learning needs (meds, wound care, etc)   Arrange for needed discharge resources and transportation as appropriate   Arrange for interpreters to assist at discharge as needed   Refer to discharge planning if patient needs post-hospital services based on physician order or complex needs related to functional status, cognitive ability or social support system     Problem: Pain  Goal: Verbalizes/displays adequate comfort level or baseline comfort level  Outcome: Progressing     Problem: Safety - Adult  Goal: Free from fall injury  Outcome: Progressing  Flowsheets (Taken 1/20/2024 1600)  Free From Fall Injury:   Instruct family/caregiver on patient safety   Based on caregiver fall risk screen, instruct family/caregiver to ask for assistance with transferring infant if caregiver noted to have fall risk factors     Problem: Respiratory - Adult  Goal: Achieves optimal ventilation and oxygenation  1/20/2024 1854 by Don Alarcon, RN  Outcome: Progressing  1/20/2024 0911 by Zachariah Crook RCP  Outcome: Progressing  Flowsheets (Taken 1/20/2024 0800 by Don Alarcon, RN)  Achieves optimal ventilation and oxygenation:   Assess for changes in mentation and behavior   Assess for changes in respiratory status   Oxygen supplementation based on oxygen saturation or arterial blood gases   Position to facilitate oxygenation and minimize respiratory effort   Initiate smoking cessation protocol as indicated   Assess the need for suctioning and aspirate as needed   Assess and instruct to report shortness of breath or any respiratory difficulty   Respiratory therapy support as indicated   Encourage broncho-pulmonary hygiene including  cough, deep breathe, incentive spirometry     Problem: Chronic Conditions and Co-morbidities  Goal: Patient's chronic conditions and co-morbidity symptoms are monitored and maintained or improved  Outcome: Progressing  Flowsheets (Taken 1/20/2024 0800)  Care Plan - Patient's Chronic Conditions and Co-Morbidity Symptoms are Monitored and Maintained or Improved:   Monitor and assess patient's chronic conditions and comorbid symptoms for stability, deterioration, or improvement   Collaborate with multidisciplinary team to address chronic and comorbid conditions and prevent exacerbation or deterioration   Update acute care plan with appropriate goals if chronic or comorbid symptoms are exacerbated and prevent overall improvement and discharge     Problem: Skin/Tissue Integrity  Goal: Absence of new skin breakdown  Description: 1.  Monitor for areas of redness and/or skin breakdown  2.  Assess vascular access sites hourly  3.  Every 4-6 hours minimum:  Change oxygen saturation probe site  4.  Every 4-6 hours:  If on nasal continuous positive airway pressure, respiratory therapy assess nares and determine need for appliance change or resting period.  Outcome: Progressing     Problem: ABCDS Injury Assessment  Goal: Absence of physical injury  Outcome: Progressing  Flowsheets (Taken 1/20/2024 1600)  Absence of Physical Injury: Implement safety measures based on patient assessment     Problem: Nutrition Deficit:  Goal: Optimize nutritional status  Outcome: Progressing

## 2024-01-20 NOTE — PLAN OF CARE
Problem: Respiratory - Adult  Goal: Achieves optimal ventilation and oxygenation  1/20/2024 0911 by Zachariah Crook, AGNIESZKA  Outcome: Progressing

## 2024-01-21 ENCOUNTER — ANESTHESIA EVENT (OUTPATIENT)
Dept: OPERATING ROOM | Age: 59
End: 2024-01-21
Payer: MEDICARE

## 2024-01-21 ENCOUNTER — APPOINTMENT (OUTPATIENT)
Dept: GENERAL RADIOLOGY | Age: 59
DRG: 853 | End: 2024-01-21
Payer: MEDICARE

## 2024-01-21 ENCOUNTER — ANESTHESIA (OUTPATIENT)
Dept: OPERATING ROOM | Age: 59
End: 2024-01-21
Payer: MEDICARE

## 2024-01-21 LAB
ALBUMIN SERPL-MCNC: 1.9 G/DL (ref 3.5–5.2)
ALBUMIN/GLOB SERPL: 1 {RATIO} (ref 1–2.5)
ALLEN TEST: POSITIVE
ALP SERPL-CCNC: 988 U/L (ref 40–129)
ALT SERPL-CCNC: 40 U/L (ref 10–50)
ANION GAP SERPL CALCULATED.3IONS-SCNC: 12 MMOL/L (ref 9–17)
ARTERIAL PATENCY WRIST A: ABNORMAL
AST SERPL-CCNC: 49 U/L (ref 10–50)
BASOPHILS # BLD: 0.08 K/UL (ref 0–0.2)
BASOPHILS NFR BLD: 1 % (ref 0–2)
BILIRUB DIRECT SERPL-MCNC: 0.6 MG/DL (ref 0–0.3)
BILIRUB INDIRECT SERPL-MCNC: 0.3 MG/DL (ref 0–1)
BILIRUB SERPL-MCNC: 0.8 MG/DL (ref 0–1.2)
BODY TEMPERATURE: 37
BUN SERPL-MCNC: 39 MG/DL (ref 6–20)
CA-I BLD-SCNC: 1.37 MMOL/L (ref 1.13–1.33)
CA-I BLD-SCNC: 1.42 MMOL/L (ref 1.13–1.33)
CALCIUM SERPL-MCNC: 8.6 MG/DL (ref 8.6–10.4)
CHLORIDE SERPL-SCNC: 115 MMOL/L (ref 98–107)
CHLORIDE, WHOLE BLOOD: 122 MMOL/L (ref 98–110)
CO2 SERPL-SCNC: 15 MMOL/L (ref 20–31)
COHGB MFR BLD: 1 % (ref 0–5)
CREAT SERPL-MCNC: 1.1 MG/DL (ref 0.7–1.2)
EOSINOPHIL # BLD: 0.73 K/UL (ref 0–0.44)
EOSINOPHILS RELATIVE PERCENT: 9 % (ref 1–4)
ERYTHROCYTE [DISTWIDTH] IN BLOOD BY AUTOMATED COUNT: 19.5 % (ref 11.8–14.4)
FIO2 ON VENT: 50 %
FIO2: 40
GFR SERPL CREATININE-BSD FRML MDRD: >60 ML/MIN/1.73M2
GLOBULIN SER CALC-MCNC: 3.4 G/DL
GLUCOSE BLD-MCNC: 101 MG/DL (ref 75–110)
GLUCOSE BLD-MCNC: 106 MG/DL (ref 75–110)
GLUCOSE BLD-MCNC: 108 MG/DL (ref 75–110)
GLUCOSE BLD-MCNC: 134 MG/DL (ref 75–110)
GLUCOSE BLD-MCNC: 93 MG/DL (ref 75–110)
GLUCOSE BLD-MCNC: 96 MG/DL (ref 75–110)
GLUCOSE SERPL-MCNC: 113 MG/DL (ref 70–99)
HCO3 ARTERIAL: 15.4 MMOL/L (ref 22–27)
HCT VFR BLD AUTO: 24.3 % (ref 40.7–50.3)
HCT VFR BLD CALC: 26.6 % (ref 40.7–50.3)
HEMOGLOBIN: 8.6 GM/DL (ref 13–17)
HGB BLD-MCNC: 7 G/DL (ref 13–17)
IMM GRANULOCYTES # BLD AUTO: 0.22 K/UL (ref 0–0.3)
IMM GRANULOCYTES NFR BLD: 3 %
LACTIC ACID, WHOLE BLOOD: 1.2 MMOL/L (ref 0.7–2.1)
LYMPHOCYTES NFR BLD: 1.05 K/UL (ref 1.1–3.7)
LYMPHOCYTES RELATIVE PERCENT: 13 % (ref 24–43)
MAGNESIUM SERPL-MCNC: 2.6 MG/DL (ref 1.6–2.6)
MCH RBC QN AUTO: 29.8 PG (ref 25.2–33.5)
MCHC RBC AUTO-ENTMCNC: 28.8 G/DL (ref 28.4–34.8)
MCV RBC AUTO: 103.4 FL (ref 82.6–102.9)
MONOCYTES NFR BLD: 0.82 K/UL (ref 0.1–1.2)
MONOCYTES NFR BLD: 10 % (ref 3–12)
NEGATIVE BASE EXCESS, ART: 10 MMOL/L (ref 0–2)
NEGATIVE BASE EXCESS, ART: 11.5 MMOL/L (ref 0–2)
NEUTROPHILS NFR BLD: 63 % (ref 36–65)
NEUTS SEG NFR BLD: 5 K/UL (ref 1.5–8.1)
NRBC BLD-RTO: 0.3 PER 100 WBC
O2 DELIVERY DEVICE: ABNORMAL
O2 SAT, ARTERIAL: 89.4 % (ref 94–100)
PCO2 ARTERIAL: 41.2 MMHG (ref 32–45)
PH ARTERIAL: 7.2 (ref 7.35–7.45)
PHOSPHATE SERPL-MCNC: 5.7 MG/DL (ref 2.5–4.5)
PLATELET # BLD AUTO: 141 K/UL (ref 138–453)
PMV BLD AUTO: 9.8 FL (ref 8.1–13.5)
PO2 ARTERIAL: 61.7 MMHG (ref 75–95)
POC HCO3: 15.4 MMOL/L (ref 21–28)
POC LACTIC ACID: <0.3 MMOL/L (ref 0.56–1.39)
POC O2 SATURATION: 99.5 % (ref 94–98)
POC PCO2: 31.6 MM HG (ref 35–48)
POC PH: 7.3 (ref 7.35–7.45)
POC PO2: 180.3 MM HG (ref 83–108)
POTASSIUM SERPL-SCNC: 3.9 MMOL/L (ref 3.7–5.3)
POTASSIUM, WHOLE BLOOD: 3.8 MMOL/L (ref 3.6–5)
PROT SERPL-MCNC: 5.3 G/DL (ref 6.6–8.7)
RBC # BLD AUTO: 2.35 M/UL (ref 4.21–5.77)
RBC # BLD: ABNORMAL 10*6/UL
RBC # BLD: ABNORMAL 10*6/UL
SAMPLE SITE: ABNORMAL
SODIUM SERPL-SCNC: 142 MMOL/L (ref 135–144)
SODIUM, WHOLE BLOOD: 147 MMOL/L (ref 136–145)
WBC OTHER # BLD: 7.9 K/UL (ref 3.5–11.3)

## 2024-01-21 PROCEDURE — 84100 ASSAY OF PHOSPHORUS: CPT

## 2024-01-21 PROCEDURE — 83735 ASSAY OF MAGNESIUM: CPT

## 2024-01-21 PROCEDURE — 80076 HEPATIC FUNCTION PANEL: CPT

## 2024-01-21 PROCEDURE — 2720000010 HC SURG SUPPLY STERILE: Performed by: SURGERY

## 2024-01-21 PROCEDURE — B548ZZA ULTRASONOGRAPHY OF SUPERIOR VENA CAVA, GUIDANCE: ICD-10-PCS | Performed by: SURGERY

## 2024-01-21 PROCEDURE — C1751 CATH, INF, PER/CENT/MIDLINE: HCPCS

## 2024-01-21 PROCEDURE — 2580000003 HC RX 258

## 2024-01-21 PROCEDURE — 82803 BLOOD GASES ANY COMBINATION: CPT

## 2024-01-21 PROCEDURE — 6370000000 HC RX 637 (ALT 250 FOR IP)

## 2024-01-21 PROCEDURE — 85025 COMPLETE CBC W/AUTO DIFF WBC: CPT

## 2024-01-21 PROCEDURE — 94761 N-INVAS EAR/PLS OXIMETRY MLT: CPT

## 2024-01-21 PROCEDURE — 85014 HEMATOCRIT: CPT

## 2024-01-21 PROCEDURE — 83605 ASSAY OF LACTIC ACID: CPT

## 2024-01-21 PROCEDURE — 0WUF0KZ SUPPLEMENT ABDOMINAL WALL WITH NONAUTOLOGOUS TISSUE SUBSTITUTE, OPEN APPROACH: ICD-10-PCS | Performed by: SURGERY

## 2024-01-21 PROCEDURE — 2580000003 HC RX 258: Performed by: SURGERY

## 2024-01-21 PROCEDURE — 6360000002 HC RX W HCPCS: Performed by: NURSE ANESTHETIST, CERTIFIED REGISTERED

## 2024-01-21 PROCEDURE — 2500000003 HC RX 250 WO HCPCS

## 2024-01-21 PROCEDURE — 94003 VENT MGMT INPAT SUBQ DAY: CPT

## 2024-01-21 PROCEDURE — 71045 X-RAY EXAM CHEST 1 VIEW: CPT

## 2024-01-21 PROCEDURE — 86901 BLOOD TYPING SEROLOGIC RH(D): CPT

## 2024-01-21 PROCEDURE — 2000000000 HC ICU R&B

## 2024-01-21 PROCEDURE — 82947 ASSAY GLUCOSE BLOOD QUANT: CPT

## 2024-01-21 PROCEDURE — 6360000002 HC RX W HCPCS

## 2024-01-21 PROCEDURE — 84132 ASSAY OF SERUM POTASSIUM: CPT

## 2024-01-21 PROCEDURE — 36569 INSJ PICC 5 YR+ W/O IMAGING: CPT

## 2024-01-21 PROCEDURE — 3600000013 HC SURGERY LEVEL 3 ADDTL 15MIN: Performed by: SURGERY

## 2024-01-21 PROCEDURE — P9045 ALBUMIN (HUMAN), 5%, 250 ML: HCPCS

## 2024-01-21 PROCEDURE — 3700000001 HC ADD 15 MINUTES (ANESTHESIA): Performed by: SURGERY

## 2024-01-21 PROCEDURE — 02HV33Z INSERTION OF INFUSION DEVICE INTO SUPERIOR VENA CAVA, PERCUTANEOUS APPROACH: ICD-10-PCS | Performed by: SURGERY

## 2024-01-21 PROCEDURE — 2580000003 HC RX 258: Performed by: INTERNAL MEDICINE

## 2024-01-21 PROCEDURE — 3600000003 HC SURGERY LEVEL 3 BASE: Performed by: SURGERY

## 2024-01-21 PROCEDURE — 2709999900 HC NON-CHARGEABLE SUPPLY: Performed by: SURGERY

## 2024-01-21 PROCEDURE — 86850 RBC ANTIBODY SCREEN: CPT

## 2024-01-21 PROCEDURE — 2580000003 HC RX 258: Performed by: NURSE ANESTHETIST, CERTIFIED REGISTERED

## 2024-01-21 PROCEDURE — 80048 BASIC METABOLIC PNL TOTAL CA: CPT

## 2024-01-21 PROCEDURE — 86920 COMPATIBILITY TEST SPIN: CPT

## 2024-01-21 PROCEDURE — 82805 BLOOD GASES W/O2 SATURATION: CPT

## 2024-01-21 PROCEDURE — 76937 US GUIDE VASCULAR ACCESS: CPT

## 2024-01-21 PROCEDURE — 2700000000 HC OXYGEN THERAPY PER DAY

## 2024-01-21 PROCEDURE — 85018 HEMOGLOBIN: CPT

## 2024-01-21 PROCEDURE — 99233 SBSQ HOSP IP/OBS HIGH 50: CPT | Performed by: INTERNAL MEDICINE

## 2024-01-21 PROCEDURE — 86900 BLOOD TYPING SEROLOGIC ABO: CPT

## 2024-01-21 PROCEDURE — 36415 COLL VENOUS BLD VENIPUNCTURE: CPT

## 2024-01-21 PROCEDURE — 6360000002 HC RX W HCPCS: Performed by: INTERNAL MEDICINE

## 2024-01-21 PROCEDURE — 2500000003 HC RX 250 WO HCPCS: Performed by: NURSE ANESTHETIST, CERTIFIED REGISTERED

## 2024-01-21 PROCEDURE — 36600 WITHDRAWAL OF ARTERIAL BLOOD: CPT

## 2024-01-21 PROCEDURE — 3700000000 HC ANESTHESIA ATTENDED CARE: Performed by: SURGERY

## 2024-01-21 PROCEDURE — 82330 ASSAY OF CALCIUM: CPT

## 2024-01-21 DEVICE — CYTAL® WOUND MATRIX 2-LAYER, 10CM X 15CM
Type: IMPLANTABLE DEVICE | Site: ABDOMEN | Status: FUNCTIONAL
Brand: CYTAL®

## 2024-01-21 RX ORDER — MIDAZOLAM HYDROCHLORIDE 1 MG/ML
INJECTION INTRAMUSCULAR; INTRAVENOUS PRN
Status: DISCONTINUED | OUTPATIENT
Start: 2024-01-21 | End: 2024-01-21 | Stop reason: SDUPTHER

## 2024-01-21 RX ORDER — ROCURONIUM BROMIDE 10 MG/ML
INJECTION, SOLUTION INTRAVENOUS PRN
Status: DISCONTINUED | OUTPATIENT
Start: 2024-01-21 | End: 2024-01-21 | Stop reason: SDUPTHER

## 2024-01-21 RX ORDER — DEXAMETHASONE SODIUM PHOSPHATE 10 MG/ML
INJECTION INTRAMUSCULAR; INTRAVENOUS PRN
Status: DISCONTINUED | OUTPATIENT
Start: 2024-01-21 | End: 2024-01-21 | Stop reason: SDUPTHER

## 2024-01-21 RX ORDER — FENTANYL CITRATE 50 UG/ML
INJECTION, SOLUTION INTRAMUSCULAR; INTRAVENOUS PRN
Status: DISCONTINUED | OUTPATIENT
Start: 2024-01-21 | End: 2024-01-21 | Stop reason: SDUPTHER

## 2024-01-21 RX ORDER — SODIUM CHLORIDE, SODIUM LACTATE, POTASSIUM CHLORIDE, CALCIUM CHLORIDE 600; 310; 30; 20 MG/100ML; MG/100ML; MG/100ML; MG/100ML
INJECTION, SOLUTION INTRAVENOUS CONTINUOUS PRN
Status: DISCONTINUED | OUTPATIENT
Start: 2024-01-21 | End: 2024-01-21 | Stop reason: SDUPTHER

## 2024-01-21 RX ORDER — MAGNESIUM HYDROXIDE 1200 MG/15ML
LIQUID ORAL CONTINUOUS PRN
Status: COMPLETED | OUTPATIENT
Start: 2024-01-21 | End: 2024-01-21

## 2024-01-21 RX ORDER — PHENYLEPHRINE HCL IN 0.9% NACL 1 MG/10 ML
SYRINGE (ML) INTRAVENOUS PRN
Status: DISCONTINUED | OUTPATIENT
Start: 2024-01-21 | End: 2024-01-21 | Stop reason: SDUPTHER

## 2024-01-21 RX ORDER — ALBUMIN, HUMAN INJ 5% 5 %
SOLUTION INTRAVENOUS PRN
Status: DISCONTINUED | OUTPATIENT
Start: 2024-01-21 | End: 2024-01-21 | Stop reason: SDUPTHER

## 2024-01-21 RX ORDER — ONDANSETRON 2 MG/ML
INJECTION INTRAMUSCULAR; INTRAVENOUS PRN
Status: DISCONTINUED | OUTPATIENT
Start: 2024-01-21 | End: 2024-01-21 | Stop reason: SDUPTHER

## 2024-01-21 RX ADMIN — SODIUM CHLORIDE, POTASSIUM CHLORIDE, SODIUM LACTATE AND CALCIUM CHLORIDE: 600; 310; 30; 20 INJECTION, SOLUTION INTRAVENOUS at 16:28

## 2024-01-21 RX ADMIN — ACETAMINOPHEN 1000 MG: 650 SOLUTION ORAL at 21:38

## 2024-01-21 RX ADMIN — ONDANSETRON 4 MG: 2 INJECTION INTRAMUSCULAR; INTRAVENOUS at 16:02

## 2024-01-21 RX ADMIN — OXYCODONE HYDROCHLORIDE 10 MG: 5 TABLET ORAL at 12:08

## 2024-01-21 RX ADMIN — ROCURONIUM BROMIDE 30 MG: 10 INJECTION, SOLUTION INTRAVENOUS at 14:52

## 2024-01-21 RX ADMIN — CEFTOLOZANE AND TAZOBACTAM 3000 MG: 1; .5 INJECTION, POWDER, LYOPHILIZED, FOR SOLUTION INTRAVENOUS at 04:55

## 2024-01-21 RX ADMIN — SODIUM CHLORIDE, PRESERVATIVE FREE 10 ML: 5 INJECTION INTRAVENOUS at 01:25

## 2024-01-21 RX ADMIN — FENTANYL CITRATE 50 MCG: 50 INJECTION, SOLUTION INTRAMUSCULAR; INTRAVENOUS at 14:56

## 2024-01-21 RX ADMIN — HYDROMORPHONE HYDROCHLORIDE 0.5 MG: 1 INJECTION, SOLUTION INTRAMUSCULAR; INTRAVENOUS; SUBCUTANEOUS at 17:40

## 2024-01-21 RX ADMIN — Medication 100 MCG: at 15:27

## 2024-01-21 RX ADMIN — MIDODRINE HYDROCHLORIDE 15 MG: 5 TABLET ORAL at 18:36

## 2024-01-21 RX ADMIN — OXYCODONE HYDROCHLORIDE 10 MG: 5 TABLET ORAL at 04:50

## 2024-01-21 RX ADMIN — HEPARIN SODIUM 7500 UNITS: 5000 INJECTION INTRAVENOUS; SUBCUTANEOUS at 21:39

## 2024-01-21 RX ADMIN — HEPARIN SODIUM 7500 UNITS: 5000 INJECTION INTRAVENOUS; SUBCUTANEOUS at 14:14

## 2024-01-21 RX ADMIN — ACETAMINOPHEN 1000 MG: 650 SOLUTION ORAL at 12:08

## 2024-01-21 RX ADMIN — FENTANYL CITRATE 50 MCG: 50 INJECTION, SOLUTION INTRAMUSCULAR; INTRAVENOUS at 15:17

## 2024-01-21 RX ADMIN — OXYCODONE HYDROCHLORIDE 10 MG: 5 TABLET ORAL at 20:14

## 2024-01-21 RX ADMIN — Medication 100 MCG: at 15:59

## 2024-01-21 RX ADMIN — Medication 200 MCG: at 16:09

## 2024-01-21 RX ADMIN — Medication 100 MCG: at 15:24

## 2024-01-21 RX ADMIN — SODIUM CHLORIDE, POTASSIUM CHLORIDE, SODIUM LACTATE AND CALCIUM CHLORIDE: 600; 310; 30; 20 INJECTION, SOLUTION INTRAVENOUS at 14:43

## 2024-01-21 RX ADMIN — SODIUM CHLORIDE, PRESERVATIVE FREE 10 ML: 5 INJECTION INTRAVENOUS at 20:14

## 2024-01-21 RX ADMIN — MIDAZOLAM 2 MG: 1 INJECTION INTRAMUSCULAR; INTRAVENOUS at 14:50

## 2024-01-21 RX ADMIN — MIDODRINE HYDROCHLORIDE 15 MG: 5 TABLET ORAL at 08:09

## 2024-01-21 RX ADMIN — HYDROMORPHONE HYDROCHLORIDE 0.5 MG: 1 INJECTION, SOLUTION INTRAMUSCULAR; INTRAVENOUS; SUBCUTANEOUS at 01:24

## 2024-01-21 RX ADMIN — CEFTOLOZANE AND TAZOBACTAM 3000 MG: 1; .5 INJECTION, POWDER, LYOPHILIZED, FOR SOLUTION INTRAVENOUS at 21:34

## 2024-01-21 RX ADMIN — Medication 100 MCG: at 15:41

## 2024-01-21 RX ADMIN — OXYCODONE HYDROCHLORIDE 10 MG: 5 TABLET ORAL at 08:08

## 2024-01-21 RX ADMIN — ALBUMIN (HUMAN) 12.5 G: 12.5 INJECTION, SOLUTION INTRAVENOUS at 15:49

## 2024-01-21 RX ADMIN — Medication 100 MCG: at 15:37

## 2024-01-21 RX ADMIN — DEXAMETHASONE SODIUM PHOSPHATE 4 MG: 10 INJECTION INTRAMUSCULAR; INTRAVENOUS at 15:01

## 2024-01-21 RX ADMIN — ASPIRIN 81 MG 81 MG: 81 TABLET ORAL at 08:08

## 2024-01-21 RX ADMIN — ALPRAZOLAM 0.25 MG: 0.25 TABLET ORAL at 20:14

## 2024-01-21 RX ADMIN — HEPARIN SODIUM 7500 UNITS: 5000 INJECTION INTRAVENOUS; SUBCUTANEOUS at 06:22

## 2024-01-21 RX ADMIN — CEFTOLOZANE AND TAZOBACTAM 3000 MG: 1; .5 INJECTION, POWDER, LYOPHILIZED, FOR SOLUTION INTRAVENOUS at 13:01

## 2024-01-21 RX ADMIN — LEVOTHYROXINE SODIUM 200 MCG: 100 TABLET ORAL at 08:08

## 2024-01-21 RX ADMIN — Medication 150 MCG: at 15:52

## 2024-01-21 RX ADMIN — AMIODARONE HYDROCHLORIDE 200 MG: 200 TABLET ORAL at 08:08

## 2024-01-21 RX ADMIN — Medication 150 MCG: at 15:43

## 2024-01-21 RX ADMIN — LANSOPRAZOLE 30 MG: 30 TABLET, ORALLY DISINTEGRATING, DELAYED RELEASE ORAL at 18:38

## 2024-01-21 RX ADMIN — OXYCODONE HYDROCHLORIDE 10 MG: 5 TABLET ORAL at 18:38

## 2024-01-21 RX ADMIN — SUGAMMADEX 200 MG: 100 INJECTION, SOLUTION INTRAVENOUS at 16:27

## 2024-01-21 RX ADMIN — ACETAMINOPHEN 1000 MG: 650 SOLUTION ORAL at 04:50

## 2024-01-21 RX ADMIN — MIDODRINE HYDROCHLORIDE 15 MG: 5 TABLET ORAL at 12:08

## 2024-01-21 RX ADMIN — LANSOPRAZOLE 30 MG: 30 TABLET, ORALLY DISINTEGRATING, DELAYED RELEASE ORAL at 08:08

## 2024-01-21 RX ADMIN — SODIUM CHLORIDE, PRESERVATIVE FREE 10 ML: 5 INJECTION INTRAVENOUS at 08:13

## 2024-01-21 ASSESSMENT — PAIN DESCRIPTION - LOCATION: LOCATION: ABDOMEN

## 2024-01-21 ASSESSMENT — PULMONARY FUNCTION TESTS
PIF_VALUE: 16
PIF_VALUE: 23
PIF_VALUE: 22
PIF_VALUE: 26
PIF_VALUE: 16
PIF_VALUE: 13
PIF_VALUE: 24
PIF_VALUE: 10
PIF_VALUE: 21
PIF_VALUE: 17
PIF_VALUE: 21
PIF_VALUE: 21
PIF_VALUE: 24
PIF_VALUE: 16
PIF_VALUE: 23

## 2024-01-21 ASSESSMENT — PAIN DESCRIPTION - DESCRIPTORS: DESCRIPTORS: ACHING;DISCOMFORT

## 2024-01-21 NOTE — PROGRESS NOTES
1.3-1.5. CBC remarkable for WBC 15.7, hemoglbin 9.9. lactic acid 4.1. During previous hospitalizations patient was positive for Citrobacter Koseri and Enterococcus faecalis.  Previous sputum culture was positive for Pseudomonas.   Patient currently on two pressors Cipriano and Levophed. He does have trach in place and is on ventilator support. Blood pressures are maintaining. Current labs as below. Vanco switched to zyvox due to neisha on ckd. Platelet count dropping will consider switch zyvox to dapto if persistent drop. Patient does have art line, central line, chronic wells and trach.     Interval changes  1/21/2024   Patient Vitals for the past 8 hrs:   BP Temp Temp src Pulse Resp SpO2   01/21/24 0634 -- -- -- 65 15 100 %   01/21/24 0600 (!) 101/51 97.9 °F (36.6 °C) -- 79 25 (!) 86 %   01/21/24 0500 (!) 104/59 98.2 °F (36.8 °C) -- 73 17 100 %   01/21/24 0400 116/62 98.4 °F (36.9 °C) Bladder 71 22 --   01/21/24 0333 -- -- -- 75 (!) 37 100 %   01/21/24 0300 (!) 101/41 98.4 °F (36.9 °C) -- 68 17 --   01/21/24 0200 (!) 107/49 98.4 °F (36.9 °C) -- 67 15 100 %   01/21/24 0124 -- -- -- -- 16 --   01/21/24 0100 102/85 98.4 °F (36.9 °C) -- 72 20 100 %   01/21/24 0000 (!) 100/40 98.2 °F (36.8 °C) Bladder 67 14 100 %   01/20/24 2300 (!) 98/47 98.1 °F (36.7 °C) -- 68 14 100 %     1/4/24  Had open peg 1/3 and planned for more sx  15  Sp cx pseudomonas and U cx proteus  BC contamination  Vented and sedated -  Wells mary   New PEG drain bloody  2 ALBIN drains serosang  WBC up 45 range still  - no pressors -   Abd w VAC bad large wound    1/9/24  Hypo thermic if any  Cx abd wall w yeast and GNR enterococcus - more ID to follow 1/5  CTAP bilat LL atelectasis - no abscess 1/7/24  Plts still dropping 73 - HIT??    1/10  Better  in general WBC down to 12  Cx  w GNR and saccaromyces   Planned for LTAC  vAC large abd   Plan to switch VAC 1/12 1/11  Alert on the trach vent - abd w large VAC wound - no fever - R iv site indurated and red  rhinorrhea.      Mouth/Throat:      Mouth: Mucous membranes are moist.   Eyes:      General: No scleral icterus.     Extraocular Movements: Extraocular movements intact.      Conjunctiva/sclera: Conjunctivae normal.   Cardiovascular:      Rate and Rhythm: Tachycardia present.      Heart sounds: No murmur heard.     No friction rub.   Pulmonary:      Effort: No respiratory distress.      Breath sounds: No stridor. No wheezing, rhonchi or rales.      Comments: trached  Chest:      Chest wall: No tenderness.   Abdominal:      General: There is distension.      Palpations: There is no mass.      Tenderness: There is abdominal tenderness.      Comments: Large surgical wound open with wound vac in place over majority of abdomen    Genitourinary:     Comments: Clemons in place  Musculoskeletal:         General: No swelling, tenderness or deformity.      Cervical back: Neck supple. No rigidity or tenderness.      Right lower leg: Edema present.      Left lower leg: Edema present.   Skin:     Coloration: Skin is not jaundiced.      Findings: No bruising.      Comments: Cool, dry    Neurological:      Mental Status: He is alert.      Cranial Nerves: No cranial nerve deficit.      Sensory: No sensory deficit.      Motor: Weakness present.   Psychiatric:         Mood and Affect: Mood normal.         Thought Content: Thought content normal.         Past Medical History:     Past Medical History:   Diagnosis Date    A-fib (HCC) 4/18/2023    Anxiety and depression     Back pain, chronic     BPH (benign prostatic hyperplasia)     Cellulitis of left lower extremity 08/21/2017    Cellulitis of right lower extremity 08/19/2017    CHF (congestive heart failure) (HCC)     Chronic respiratory failure with hypoxia (HCC)     prn    Cirrhosis (HCC)     Diabetes mellitus (HCC)     not checking bloodsugar at home    Epididymoorchitis     GERD (gastroesophageal reflux disease)     H/O cardiac catheterization not sure of date    no stents

## 2024-01-21 NOTE — PROGRESS NOTES
ICU PROGRESS NOTE        PATIENT NAME: Rubne Dimas  MEDICAL RECORD NO. [id number]  DATE: 1/21/2024    HD: # 20    ASSESSMENT    Patient Active Problem List   Diagnosis    Alcoholic cirrhosis of liver (HCC), sober since 2013    Peripheral edema    Bipolar disorder (HCC)    Type 2 diabetes mellitus with diabetic neuropathy, with long-term current use of insulin (HCC)    Essential hypertension, currently hypotensive    Dyslipidemia    Weakness    Acute metabolic encephalopathy    FABI (obstructive sleep apnea)    Primary osteoarthritis of right knee    Type 2 diabetes mellitus with diabetic neuropathy (HCC)    Acquired hypothyroidism    Urine retention    Anemia    Slow transit constipation    Iron deficiency anemia due to chronic blood loss    Gastroesophageal reflux disease without esophagitis    LEONARDA (acute kidney injury) (HCC)    Secondary esophageal varices without bleeding (HCC)    Portal hypertension (HCC)    Obesity, morbid, BMI 50 or higher (HCC)    Venous stasis dermatitis of both lower extremities    Cellulitis of perineum    Chronic indwelling Clemons catheter    Anxiety and depression    Back pain, chronic    BPH (benign prostatic hyperplasia)    Chronic diastolic CHF (congestive heart failure) (HCC)    Cirrhosis (HCC)    Diabetes mellitus (HCC)    GERD (gastroesophageal reflux disease)    Hepatitis    Hiatal hernia    Hypertension, essential    IBS (irritable bowel syndrome)    Morbid obesity with BMI of 45.0-49.9, adult (HCC)    Neuropathy    Chronic respiratory failure with hypoxia, on home oxygen therapy (HCC)    Sleep apnea    Thyroid disease    Urinary bladder neurogenic dysfunction    Acute UTI    Musculoskeletal immobility    Pyocystis    Myoclonic jerking    Thrombocytopenia (HCC)    Hypotension    Sepsis with acute hypercapnic respiratory failure and septic shock (HCC)    Right lower lobe pneumonia    Acute kidney injury superimposed on CKD (HCC)    Somnolence    Acute respiratory acidosis (HCC)

## 2024-01-21 NOTE — PLAN OF CARE
plan with appropriate goals if chronic or comorbid symptoms are exacerbated and prevent overall improvement and discharge     Problem: Skin/Tissue Integrity  Goal: Absence of new skin breakdown  Description: 1.  Monitor for areas of redness and/or skin breakdown  2.  Assess vascular access sites hourly  3.  Every 4-6 hours minimum:  Change oxygen saturation probe site  4.  Every 4-6 hours:  If on nasal continuous positive airway pressure, respiratory therapy assess nares and determine need for appliance change or resting period.  1/21/2024 0145 by Luis Tafoya RN  Outcome: Progressing  1/20/2024 1854 by Don Alarcon RN  Outcome: Progressing     Problem: ABCDS Injury Assessment  Goal: Absence of physical injury  1/21/2024 0145 by Luis Tafoya RN  Outcome: Progressing  Flowsheets (Taken 1/20/2024 2311)  Absence of Physical Injury: Implement safety measures based on patient assessment  1/20/2024 1854 by Don Alarcon RN  Outcome: Progressing  Flowsheets (Taken 1/20/2024 1600)  Absence of Physical Injury: Implement safety measures based on patient assessment     Problem: Nutrition Deficit:  Goal: Optimize nutritional status  1/21/2024 0145 by Luis Tafoya RN  Outcome: Progressing  1/20/2024 1854 by Don Alarcon RN  Outcome: Progressing

## 2024-01-21 NOTE — PLAN OF CARE
Problem: Discharge Planning  Goal: Discharge to home or other facility with appropriate resources  1/21/2024 1052 by Carloz Torres RN  Outcome: Progressing  1/21/2024 0145 by Luis Tafoya RN  Outcome: Progressing  Flowsheets (Taken 1/20/2024 2000)  Discharge to home or other facility with appropriate resources: Identify barriers to discharge with patient and caregiver     Problem: Pain  Goal: Verbalizes/displays adequate comfort level or baseline comfort level  1/21/2024 1052 by Carloz Torres RN  Outcome: Progressing  Flowsheets (Taken 1/21/2024 0400 by Luis Tafoya RN)  Verbalizes/displays adequate comfort level or baseline comfort level: Assess pain using appropriate pain scale  1/21/2024 0145 by Luis Tafoya RN  Outcome: Progressing  Flowsheets  Taken 1/21/2024 0000  Verbalizes/displays adequate comfort level or baseline comfort level: Assess pain using appropriate pain scale  Taken 1/20/2024 2000  Verbalizes/displays adequate comfort level or baseline comfort level: Assess pain using appropriate pain scale     Problem: Safety - Adult  Goal: Free from fall injury  1/21/2024 1052 by Carloz Torres RN  Outcome: Progressing  1/21/2024 0145 by Luis Tafoya RN  Outcome: Progressing  Flowsheets (Taken 1/20/2024 2311)  Free From Fall Injury: Based on caregiver fall risk screen, instruct family/caregiver to ask for assistance with transferring infant if caregiver noted to have fall risk factors     Problem: Respiratory - Adult  Goal: Achieves optimal ventilation and oxygenation  1/21/2024 1052 by Carloz Torres RN  Outcome: Progressing  1/21/2024 0851 by Zachariah Crook RCP  Outcome: Progressing  1/21/2024 0145 by Luis Tafoya RN  Outcome: Progressing  1/20/2024 2232 by Con Caicedo RCP  Flowsheets  Taken 1/20/2024 2232 by Con Caicedo RCP  Achieves optimal ventilation and oxygenation:   Assess for changes in respiratory status   Assess the need for suctioning  based on patient assessment     Problem: Nutrition Deficit:  Goal: Optimize nutritional status  1/21/2024 1052 by Carloz Torres, RN  Outcome: Progressing  1/21/2024 0145 by Luis Tafoya, RN  Outcome: Progressing

## 2024-01-21 NOTE — OP NOTE
Operative Note      Patient: Ruben Dimas  YOB: 1965  MRN: [id number]    Date of Procedure: 1/21/2024    Pre-Op Diagnosis Codes:     * Necrotizing fasciitis (HCC) [M72.6]    Post-Op Diagnosis: Same       Procedure(s):  Excisional debridement of open wound with Misonix (total area = [id number])  Application of skin substitute (Cytal and Micromatrix) total area = 13u68qs  Application of wound vac >50 sq cm    Surgeon(s):  Fazal Wood MD    Assistant:   Floresita Doll DO    Anesthesia: General    Estimated Blood Loss (mL): Minimal    Complications: None    Specimens:   * No specimens in log *    Implants:  Implant Name Type Inv. Item Serial No.  Lot No. LRB No. Used Action   DRESSING WND MICRONIZED PARTIC 1000 MG MATRISTEM MICROMATRIX - [id number]  DRESSING WND MICRONIZED PARTIC 1000 MG MATRISTEM MICROMATRIX  [employer] [id number] N/A 1 Implanted   DRESSING WND MICRONIZED PARTIC 1000 MG MATRISTEM MICROMATRIX - [id number]  DRESSING WND MICRONIZED PARTIC 1000 MG MATRISTEM MICROMATRIX  [employer] [id number] N/A 1 Implanted   DRESSING WND 6 LAYR 10X15 CM MTRX CYTAL - [id number]  DRESSING WND 6 LAYR 10X15 CM MTRX CYTAL  [employer] [id number] N/A 1 Implanted   DRESSING WND MULTLYR CLLGN MTRX 10X15 CM SHT [employer]RISTEM - [id number]  DRESSING WND MULTLYR CLLGN MTRX 10X15 CM SHT MATRISTEM  [employer] [id number] N/A 1 Implanted   DRESSING WND MULTLYR CLLGN MTRX 10X15 CM SHT [employer]RISTEM - [id number]  DRESSING WND MULTLYR CLLGN MTRX 10X15 CM SHT MATRISTEM  [employer] [id number] N/A 1 Implanted         Drains:   Negative Pressure Wound Therapy Abdomen Medial;Upper (Active)   Wound Type Surgical 01/21/24 0800   Dressing Type Black Foam 01/21/24 0800   Cycle Continuous 01/21/24 0800   Target Pressure (mmHg) 150 01/21/24 0800   Canister changed? No 01/21/24 0800   Dressing Status Clean, dry & intact 01/21/24 0800   Dressing Changed Changed/New 01/17/24 1600  to bed;Catheter secured to thigh;Tamper seal intact;Bag below bladder;Bag not on floor;Lack of dependent loop in tubing;Drainage bag less than half full 01/02/24 2000   Status Draining 01/02/24 2000   Output (mL) 75 mL 01/02/24 1856       [REMOVED] Urinary Catheter 01/02/24 2 Way (Removed)   Catheter Indications Urinary retention (acute or chronic), continuous bladder irrigation or bladder outlet obstruction 01/10/24 0800   Site Assessment No urethral drainage 01/10/24 0800   Urine Color Yellow 01/10/24 1200   Urine Appearance Clear 01/10/24 1200   Collection Container Standard 01/10/24 1200   Securement Method Securing device (Describe) 01/10/24 1200   Catheter Care  Soap and water 01/10/24 1200   Catheter Best Practices  Drainage tube clipped to bed;Tamper seal intact;Bag below bladder;Bag not on floor;Lack of dependent loop in tubing;Drainage bag less than half full;Catheter secured to thigh 01/10/24 1200   Status Draining 01/10/24 0400   Output (mL) 50 mL 01/10/24 1600       [REMOVED] External Urinary Catheter (Removed)   Site Assessment Clean,dry & intact 01/11/24 0800   Placement Replaced 01/11/24 0800   Securement Method Securing device (Describe) 01/11/24 0800   Catheter Care Catheter/Wick replaced;Suction Canister/Tubing changed 01/11/24 0800   Perineal Care Yes 01/11/24 0800   Suction 40 mmgHg continuous 01/11/24 0800   Urine Color Yellow 01/11/24 0800   Urine Appearance Clear 01/11/24 0800       [REMOVED] External Urinary Catheter (Removed)   Site Assessment Intact 01/13/24 0528   Placement Replaced 01/13/24 0528   Securement Method Tape 01/13/24 0528   Catheter Care Suction Canister/Tubing changed 01/13/24 0528   Perineal Care Yes 01/13/24 0528   Suction 40 mmgHg continuous 01/13/24 0528   Urine Color Yellow 01/13/24 0528   Urine Appearance Clear 01/13/24 0528   Output (mL) 200 mL 01/14/24 1200       Findings: Findings: Excisional debridement of open wound with Misonix (total debridement area = [id number]

## 2024-01-21 NOTE — PLAN OF CARE
Problem: Respiratory - Adult  Goal: Achieves optimal ventilation and oxygenation  1/20/2024 2232 by Con Caicedo RCP  Flowsheets  Taken 1/20/2024 2232  Achieves optimal ventilation and oxygenation:   Assess for changes in respiratory status   Assess the need for suctioning and aspirate as needed   Respiratory therapy support as indicated   Oxygen supplementation based on oxygen saturation or arterial blood gases  Taken 1/20/2024 2103  Achieves optimal ventilation and oxygenation:   Assess for changes in respiratory status   Oxygen supplementation based on oxygen saturation or arterial blood gases   Assess the need for suctioning and aspirate as needed   Respiratory therapy support as indicated  1/20/2024 1854 by Don Alarcon, RN  Outcome: Progressing  1/20/2024 0911 by Zachariah Crook RCP  Outcome: Progressing  Flowsheets (Taken 1/20/2024 0800 by Don Alarcon, RN)  Achieves optimal ventilation and oxygenation:   Assess for changes in mentation and behavior   Assess for changes in respiratory status   Oxygen supplementation based on oxygen saturation or arterial blood gases   Position to facilitate oxygenation and minimize respiratory effort   Initiate smoking cessation protocol as indicated   Assess the need for suctioning and aspirate as needed   Assess and instruct to report shortness of breath or any respiratory difficulty   Respiratory therapy support as indicated   Encourage broncho-pulmonary hygiene including cough, deep breathe, incentive spirometry

## 2024-01-21 NOTE — BRIEF OP NOTE
Brief Postoperative Note      Patient: Ruben Dimas  YOB: 1965  MRN: [id number]    Date of Procedure: 1/21/2024    Pre-Op Diagnosis Codes:     * Necrotizing fasciitis (HCC) [M72.6]    Post-Op Diagnosis: Same       Procedure(s):  Excisional debridement of open wound with Misonix (total area = 78m18jl)  Application of skin substitute (Cytal and Micromatrix) total area = 15d70gz  Application of wound vac >50 sq cm    Surgeon(s):  Fazal Wood MD    Assistant:  * No surgical staff found *    Anesthesia: General    Estimated Blood Loss (mL): Minimal    Complications: None    Specimens:   * No specimens in log *    Implants:  Implant Name Type Inv. Item Serial No.  Lot No. LRB No. Used Action   DRESSING WND MICRONIZED PARTIC 1000 MG MATRISTEM MICROMATRIX - [id number]  DRESSING WND MICRONIZED PARTIC 1000 MG MATRISTEM MICROMATRIX  [employer] [id number] N/A 1 Implanted   DRESSING WND MICRONIZED PARTIC 1000 MG MATRISTEM MICROMATRIX - [id number]  DRESSING WND MICRONIZED PARTIC 1000 MG MATRISTEM MICROMATRIX  [employer] [id number] N/A 1 Implanted   DRESSING WND 6 LAYR 10X15 CM MTRX CYTAL - [id number]  DRESSING WND 6 LAYR 10X15 CM MTRX CYTAL  [employer] [id number] N/A 1 Implanted   DRESSING WND MULTLYR CLLGN MTRX 10X15 CM SHT MATRISTEM - [id number]  DRESSING WND MULTLYR CLLGN MTRX 10X15 CM SHT MATRISTEM  [employer] [id number] N/A 1 Implanted   DRESSING WND MULTLYR CLLGN MTRX 10X15 CM SHT MATRISTEM - [id number]  DRESSING WND MULTLYR CLLGN MTRX 10X15 CM SHT MATRISTEM  [employer] [id number] N/A 1 Implanted         Drains:   Negative Pressure Wound Therapy Abdomen Medial;Upper (Active)   Wound Type Surgical 01/21/24 0800   Dressing Type Black Foam 01/21/24 0800   Cycle Continuous 01/21/24 0800   Target Pressure (mmHg) 150 01/21/24 0800   Canister changed? No 01/21/24 0800   Dressing Status Clean, dry & intact 01/21/24 0800   Dressing Changed Changed/New

## 2024-01-21 NOTE — PLAN OF CARE
Problem: Respiratory - Adult  Goal: Achieves optimal ventilation and oxygenation  1/21/2024 0851 by Zachariah Crook, AGNIESZKA  Outcome: Progressing

## 2024-01-22 ENCOUNTER — APPOINTMENT (OUTPATIENT)
Dept: GENERAL RADIOLOGY | Age: 59
DRG: 853 | End: 2024-01-22
Payer: MEDICARE

## 2024-01-22 LAB
ALBUMIN SERPL-MCNC: 2 G/DL (ref 3.5–5.2)
ALBUMIN/GLOB SERPL: 1 {RATIO} (ref 1–2.5)
ALLEN TEST: POSITIVE
ALP SERPL-CCNC: 872 U/L (ref 40–129)
ALT SERPL-CCNC: 24 U/L (ref 10–50)
ANION GAP SERPL CALCULATED.3IONS-SCNC: 12 MMOL/L (ref 9–16)
AST SERPL-CCNC: 42 U/L (ref 10–50)
BASOPHILS # BLD: 0.04 K/UL (ref 0–0.2)
BASOPHILS # BLD: 0.1 K/UL (ref 0–0.2)
BASOPHILS NFR BLD: 0 % (ref 0–2)
BASOPHILS NFR BLD: 1 % (ref 0–2)
BILIRUB DIRECT SERPL-MCNC: 0.6 MG/DL (ref 0–0.3)
BILIRUB INDIRECT SERPL-MCNC: 0.3 MG/DL (ref 0–1)
BILIRUB SERPL-MCNC: 0.9 MG/DL (ref 0–1.2)
BUN SERPL-MCNC: 42 MG/DL (ref 6–20)
CA-I BLD-SCNC: 1.32 MMOL/L (ref 1.13–1.33)
CALCIUM SERPL-MCNC: 8.6 MG/DL (ref 8.6–10.4)
CHLORIDE SERPL-SCNC: 118 MMOL/L (ref 98–107)
CO2 SERPL-SCNC: 14 MMOL/L (ref 20–31)
CREAT SERPL-MCNC: 1.2 MG/DL (ref 0.7–1.2)
EOSINOPHIL # BLD: 0 K/UL (ref 0–0.4)
EOSINOPHIL # BLD: 0.03 K/UL (ref 0–0.44)
EOSINOPHILS RELATIVE PERCENT: 0 % (ref 1–4)
EOSINOPHILS RELATIVE PERCENT: 0 % (ref 1–4)
ERYTHROCYTE [DISTWIDTH] IN BLOOD BY AUTOMATED COUNT: 18.9 % (ref 11.8–14.4)
ERYTHROCYTE [DISTWIDTH] IN BLOOD BY AUTOMATED COUNT: 19.8 % (ref 11.8–14.4)
FIO2: 40
GFR SERPL CREATININE-BSD FRML MDRD: >60 ML/MIN/1.73M2
GLOBULIN SER CALC-MCNC: 3.3 G/DL
GLUCOSE BLD-MCNC: 144 MG/DL (ref 74–100)
GLUCOSE BLD-MCNC: 163 MG/DL (ref 75–110)
GLUCOSE BLD-MCNC: 171 MG/DL (ref 75–110)
GLUCOSE BLD-MCNC: 182 MG/DL (ref 75–110)
GLUCOSE SERPL-MCNC: 176 MG/DL (ref 74–99)
HCT VFR BLD AUTO: 23 % (ref 40.7–50.3)
HCT VFR BLD AUTO: 23.2 % (ref 40.7–50.3)
HCT VFR BLD AUTO: 26.8 % (ref 40.7–50.3)
HGB BLD-MCNC: 6.6 G/DL (ref 13–17)
HGB BLD-MCNC: 6.7 G/DL (ref 13–17)
HGB BLD-MCNC: 8 G/DL (ref 13–17)
IMM GRANULOCYTES # BLD AUTO: 0 K/UL (ref 0–0.3)
IMM GRANULOCYTES # BLD AUTO: 0.46 K/UL (ref 0–0.3)
IMM GRANULOCYTES NFR BLD: 0 %
IMM GRANULOCYTES NFR BLD: 3 %
LYMPHOCYTES NFR BLD: 0.71 K/UL (ref 1–4.8)
LYMPHOCYTES NFR BLD: 0.94 K/UL (ref 1.1–3.7)
LYMPHOCYTES RELATIVE PERCENT: 6 % (ref 24–43)
LYMPHOCYTES RELATIVE PERCENT: 7 % (ref 24–44)
MAGNESIUM SERPL-MCNC: 2.5 MG/DL (ref 1.6–2.6)
MCH RBC QN AUTO: 28.9 PG (ref 25.2–33.5)
MCH RBC QN AUTO: 29.6 PG (ref 25.2–33.5)
MCHC RBC AUTO-ENTMCNC: 28.7 G/DL (ref 28.4–34.8)
MCHC RBC AUTO-ENTMCNC: 29.9 G/DL (ref 28.4–34.8)
MCV RBC AUTO: 100.9 FL (ref 82.6–102.9)
MCV RBC AUTO: 99.3 FL (ref 82.6–102.9)
MODE: ABNORMAL
MONOCYTES NFR BLD: 0.61 K/UL (ref 0.1–0.8)
MONOCYTES NFR BLD: 0.77 K/UL (ref 0.1–1.2)
MONOCYTES NFR BLD: 5 % (ref 3–12)
MONOCYTES NFR BLD: 6 % (ref 1–7)
MORPHOLOGY: ABNORMAL
NEGATIVE BASE EXCESS, ART: 10.8 MMOL/L (ref 0–2)
NEUTROPHILS NFR BLD: 86 % (ref 36–65)
NEUTROPHILS NFR BLD: 86 % (ref 36–66)
NEUTS SEG NFR BLD: 12.63 K/UL (ref 1.5–8.1)
NEUTS SEG NFR BLD: 8.68 K/UL (ref 1.8–7.7)
NRBC BLD-RTO: 0 PER 100 WBC
NRBC BLD-RTO: 0 PER 100 WBC
O2 DELIVERY DEVICE: ABNORMAL
PATIENT TEMP: 36.3
PHOSPHATE SERPL-MCNC: 7.2 MG/DL (ref 2.5–4.5)
PLATELET # BLD AUTO: 129 K/UL (ref 138–453)
PLATELET # BLD AUTO: 164 K/UL (ref 138–453)
PMV BLD AUTO: 10.4 FL (ref 8.1–13.5)
PMV BLD AUTO: 9.9 FL (ref 8.1–13.5)
POC HCO3: 15.1 MMOL/L (ref 21–28)
POC LACTIC ACID: 0.5 MMOL/L (ref 0.56–1.39)
POC O2 SATURATION: 99.4 % (ref 94–98)
POC PCO2: 32.3 MM HG (ref 35–48)
POC PH: 7.28 (ref 7.35–7.45)
POC PO2: 179.5 MM HG (ref 83–108)
POTASSIUM SERPL-SCNC: 5 MMOL/L (ref 3.7–5.3)
PROT SERPL-MCNC: 5.3 G/DL (ref 6.6–8.7)
RBC # BLD AUTO: 2.28 M/UL (ref 4.21–5.77)
RBC # BLD AUTO: 2.7 M/UL (ref 4.21–5.77)
RBC # BLD: ABNORMAL 10*6/UL
SAMPLE SITE: ABNORMAL
SODIUM SERPL-SCNC: 144 MMOL/L (ref 136–145)
WBC OTHER # BLD: 10.1 K/UL (ref 3.5–11.3)
WBC OTHER # BLD: 14.9 K/UL (ref 3.5–11.3)

## 2024-01-22 PROCEDURE — 82330 ASSAY OF CALCIUM: CPT

## 2024-01-22 PROCEDURE — 94003 VENT MGMT INPAT SUBQ DAY: CPT

## 2024-01-22 PROCEDURE — P9016 RBC LEUKOCYTES REDUCED: HCPCS

## 2024-01-22 PROCEDURE — 85025 COMPLETE CBC W/AUTO DIFF WBC: CPT

## 2024-01-22 PROCEDURE — 6360000002 HC RX W HCPCS

## 2024-01-22 PROCEDURE — 85014 HEMATOCRIT: CPT

## 2024-01-22 PROCEDURE — 82947 ASSAY GLUCOSE BLOOD QUANT: CPT

## 2024-01-22 PROCEDURE — 85018 HEMOGLOBIN: CPT

## 2024-01-22 PROCEDURE — 6370000000 HC RX 637 (ALT 250 FOR IP)

## 2024-01-22 PROCEDURE — 36430 TRANSFUSION BLD/BLD COMPNT: CPT

## 2024-01-22 PROCEDURE — 2060000000 HC ICU INTERMEDIATE R&B

## 2024-01-22 PROCEDURE — 99233 SBSQ HOSP IP/OBS HIGH 50: CPT | Performed by: INTERNAL MEDICINE

## 2024-01-22 PROCEDURE — 83735 ASSAY OF MAGNESIUM: CPT

## 2024-01-22 PROCEDURE — 2580000003 HC RX 258

## 2024-01-22 PROCEDURE — 71045 X-RAY EXAM CHEST 1 VIEW: CPT

## 2024-01-22 PROCEDURE — 2700000000 HC OXYGEN THERAPY PER DAY

## 2024-01-22 PROCEDURE — 94761 N-INVAS EAR/PLS OXIMETRY MLT: CPT

## 2024-01-22 PROCEDURE — 84100 ASSAY OF PHOSPHORUS: CPT

## 2024-01-22 PROCEDURE — 80076 HEPATIC FUNCTION PANEL: CPT

## 2024-01-22 PROCEDURE — 83605 ASSAY OF LACTIC ACID: CPT

## 2024-01-22 PROCEDURE — 36415 COLL VENOUS BLD VENIPUNCTURE: CPT

## 2024-01-22 PROCEDURE — 82803 BLOOD GASES ANY COMBINATION: CPT

## 2024-01-22 PROCEDURE — 80048 BASIC METABOLIC PNL TOTAL CA: CPT

## 2024-01-22 RX ORDER — SODIUM CHLORIDE 9 MG/ML
INJECTION, SOLUTION INTRAVENOUS PRN
Status: DISCONTINUED | OUTPATIENT
Start: 2024-01-22 | End: 2024-01-22

## 2024-01-22 RX ORDER — ACETAMINOPHEN 160 MG/5ML
1000 LIQUID ORAL EVERY 8 HOURS PRN
Status: DISCONTINUED | OUTPATIENT
Start: 2024-01-22 | End: 2024-01-31 | Stop reason: HOSPADM

## 2024-01-22 RX ADMIN — LANSOPRAZOLE 30 MG: 30 TABLET, ORALLY DISINTEGRATING, DELAYED RELEASE ORAL at 08:04

## 2024-01-22 RX ADMIN — MIDODRINE HYDROCHLORIDE 15 MG: 5 TABLET ORAL at 12:53

## 2024-01-22 RX ADMIN — SODIUM CHLORIDE, PRESERVATIVE FREE 10 ML: 5 INJECTION INTRAVENOUS at 08:04

## 2024-01-22 RX ADMIN — HEPARIN SODIUM 7500 UNITS: 5000 INJECTION INTRAVENOUS; SUBCUTANEOUS at 06:31

## 2024-01-22 RX ADMIN — LANSOPRAZOLE 30 MG: 30 TABLET, ORALLY DISINTEGRATING, DELAYED RELEASE ORAL at 16:42

## 2024-01-22 RX ADMIN — HEPARIN SODIUM 7500 UNITS: 5000 INJECTION INTRAVENOUS; SUBCUTANEOUS at 21:58

## 2024-01-22 RX ADMIN — ASPIRIN 81 MG 81 MG: 81 TABLET ORAL at 08:04

## 2024-01-22 RX ADMIN — AMIODARONE HYDROCHLORIDE 200 MG: 200 TABLET ORAL at 08:05

## 2024-01-22 RX ADMIN — CEFTOLOZANE AND TAZOBACTAM 3000 MG: 1; .5 INJECTION, POWDER, LYOPHILIZED, FOR SOLUTION INTRAVENOUS at 06:29

## 2024-01-22 RX ADMIN — OXYCODONE HYDROCHLORIDE 10 MG: 5 TABLET ORAL at 06:31

## 2024-01-22 RX ADMIN — OXYCODONE HYDROCHLORIDE 10 MG: 5 TABLET ORAL at 20:34

## 2024-01-22 RX ADMIN — LEVOTHYROXINE SODIUM 200 MCG: 100 TABLET ORAL at 08:04

## 2024-01-22 RX ADMIN — OXYCODONE HYDROCHLORIDE 10 MG: 5 TABLET ORAL at 00:38

## 2024-01-22 RX ADMIN — OXYCODONE HYDROCHLORIDE 10 MG: 5 TABLET ORAL at 16:42

## 2024-01-22 RX ADMIN — CEFTOLOZANE AND TAZOBACTAM 3000 MG: 1; .5 INJECTION, POWDER, LYOPHILIZED, FOR SOLUTION INTRAVENOUS at 20:21

## 2024-01-22 RX ADMIN — DOCUSATE SODIUM LIQUID 100 MG: 100 LIQUID ORAL at 08:04

## 2024-01-22 RX ADMIN — OXYCODONE HYDROCHLORIDE 10 MG: 5 TABLET ORAL at 08:03

## 2024-01-22 RX ADMIN — MIDODRINE HYDROCHLORIDE 15 MG: 5 TABLET ORAL at 08:03

## 2024-01-22 RX ADMIN — OXYCODONE HYDROCHLORIDE 10 MG: 5 TABLET ORAL at 12:53

## 2024-01-22 RX ADMIN — MIDODRINE HYDROCHLORIDE 15 MG: 5 TABLET ORAL at 16:43

## 2024-01-22 RX ADMIN — HYDROMORPHONE HYDROCHLORIDE 0.5 MG: 1 INJECTION, SOLUTION INTRAMUSCULAR; INTRAVENOUS; SUBCUTANEOUS at 23:09

## 2024-01-22 RX ADMIN — CEFTOLOZANE AND TAZOBACTAM 3000 MG: 1; .5 INJECTION, POWDER, LYOPHILIZED, FOR SOLUTION INTRAVENOUS at 13:00

## 2024-01-22 RX ADMIN — HEPARIN SODIUM 7500 UNITS: 5000 INJECTION INTRAVENOUS; SUBCUTANEOUS at 13:03

## 2024-01-22 RX ADMIN — SENNOSIDES 8.8 MG: 8.8 LIQUID ORAL at 08:03

## 2024-01-22 RX ADMIN — ACETAMINOPHEN 1000 MG: 650 SOLUTION ORAL at 06:32

## 2024-01-22 ASSESSMENT — PAIN DESCRIPTION - LOCATION: LOCATION: GENERALIZED

## 2024-01-22 ASSESSMENT — PULMONARY FUNCTION TESTS
PIF_VALUE: 19
PIF_VALUE: 22
PIF_VALUE: 23
PIF_VALUE: 20
PIF_VALUE: 10
PIF_VALUE: 23
PIF_VALUE: 22
PIF_VALUE: 11
PIF_VALUE: 23
PIF_VALUE: 22
PIF_VALUE: 14
PIF_VALUE: 23
PIF_VALUE: 23
PIF_VALUE: 21
PIF_VALUE: 22

## 2024-01-22 ASSESSMENT — PAIN SCALES - GENERAL
PAINLEVEL_OUTOF10: 5
PAINLEVEL_OUTOF10: 9

## 2024-01-22 ASSESSMENT — PAIN DESCRIPTION - DESCRIPTORS: DESCRIPTORS: SORE;DISCOMFORT;STABBING

## 2024-01-22 NOTE — PROGRESS NOTES
Occupational Therapy    [facility]  Occupational Therapy Not Seen Note    DATE: 2024    NAME: Rbuen Dimas  MRN: [id number]   : 1965      Patient not seen this date for Occupational Therapy due to:    Blood Transfusion: Hgb 6.7    Next Scheduled Treatment:     Electronically signed by JENNY Allred on 2024 at 9:11 AM

## 2024-01-22 NOTE — PROGRESS NOTES
01/22/24 1132   Care Plan - Respiratory Goals   Achieves optimal ventilation and oxygenation Assess for changes in respiratory status;Assess for changes in mentation and behavior;Position to facilitate oxygenation and minimize respiratory effort;Oxygen supplementation based on oxygen saturation or arterial blood gases;Initiate smoking cessation protocol as indicated;Encourage broncho-pulmonary hygiene including cough, deep breathe, incentive spirometry;Assess the need for suctioning and aspirate as needed;Assess and instruct to report shortness of breath or any respiratory difficulty;Respiratory therapy support as indicated

## 2024-01-22 NOTE — PROCEDURES
Picc placement order:    Benefits include stable, long term intravenous access. Blood draws. Peripheral vein preservation. Safely deliver vesicant medications.    Risks include failure to obtain the desired result(s) of the procedure, discomfort, injury, the need for additional procedures/therapies, permanent loss of body function, bleeding/bruising, arterial puncture, air embolism, nerve damage, hematoma, phlebitis, catheter fracture/rupture, catheter embolism, catheter occlusion, catheter migration, catheter site infection, unintentional/accidental removal of catheter, bloodstream infection, infiltration, cardiac arrhythmia, vein thrombosis, difficult catheter removal.   Alternatives discussed including centrally inserted central catheter, as well as less invasive procedures such as multiple peripheral IVs, extended dwell catheters and midline placements.     Consent obtained by proceduralist, signed by Patient/DPOA.      Prescribed therapy: long term antibiotic, to be determined    Peripheral ultrasound assessment done. Plan for  right basilic  Dual lumen 5fr ,2 unsuccessful attempts, D/W primary can insert left cephalic vein but vessel CVR too great for DL. Request SL to left cephalic  Vein measurement = 0.35 cm and area based CVR= 14.2%, preferable CVR based on area would be less than 20% (which correlates to less than 45% linear CVR).  Product type: Antimicrobial/Antithrombogenic Arrow non tapered power injectable PICC  History/Labs/Allergies Reviewed  Placed By:Christopher  - RN IV Team  Assistant - Staff-RN  Time out Performed using Two Identifiers  Lot #: [id number]  Expiration date: 11/30/2024  Catheter info: 4french -  single/ lumen  Total length: 50 cm  External catheter length: 3 cm  Extremity circumference at site: 48 cm  Number of attempts: 1  Estimated blood loss: 1 ml  Placement verified by: positive blood return & flushes easily  Special equipment used: [employer] ECG Tip tracker system, ultrasound, MST, and  micro-introducer needle.   Catheter securement: Adhesive Aquiris securement device  Catheter securement adhesive (SecureportIV) utilized at insertion site  Dressing applied: Tegaderm CHG  Lidocaine administered intradermally conc.1%: Approx 2 mL    primary RN aware picc placed with VPS ECG technology and is confirmed in the distal 1/3 SVC. Picc is released for use.Rn aware new iv tubing required.      PICC education:     x  ] Discussed with patient/Family or POA prior to procedure.  Risks and Benefits along with reason for procedure were discussed and teaching was reinforced with an education handout on line  insertion. [facility] FAQ Catheter Associated Blood Stream Infections and [facility] [id number] REV. 7/13 Nursing and Booklet left at bedside or in chart. Patient (Family or POA) acknowledged understanding of information taught and agreed to procedure.      [

## 2024-01-22 NOTE — PLAN OF CARE
Problem: Discharge Planning  Goal: Discharge to home or other facility with appropriate resources  Outcome: Progressing  Flowsheets (Taken 1/21/2024 2000)  Discharge to home or other facility with appropriate resources: Identify barriers to discharge with patient and caregiver     Problem: Pain  Goal: Verbalizes/displays adequate comfort level or baseline comfort level  Outcome: Progressing  Flowsheets  Taken 1/22/2024 0400  Verbalizes/displays adequate comfort level or baseline comfort level: Assess pain using appropriate pain scale  Taken 1/22/2024 0000  Verbalizes/displays adequate comfort level or baseline comfort level: Assess pain using appropriate pain scale  Taken 1/21/2024 2000  Verbalizes/displays adequate comfort level or baseline comfort level: Assess pain using appropriate pain scale     Problem: Safety - Adult  Goal: Free from fall injury  Outcome: Progressing  Flowsheets (Taken 1/22/2024 0016)  Free From Fall Injury: Based on caregiver fall risk screen, instruct family/caregiver to ask for assistance with transferring infant if caregiver noted to have fall risk factors     Problem: Respiratory - Adult  Goal: Achieves optimal ventilation and oxygenation  Outcome: Progressing  Flowsheets (Taken 1/21/2024 2000)  Achieves optimal ventilation and oxygenation: Assess for changes in respiratory status     Problem: Chronic Conditions and Co-morbidities  Goal: Patient's chronic conditions and co-morbidity symptoms are monitored and maintained or improved  Outcome: Progressing  Flowsheets (Taken 1/21/2024 2000)  Care Plan - Patient's Chronic Conditions and Co-Morbidity Symptoms are Monitored and Maintained or Improved: Monitor and assess patient's chronic conditions and comorbid symptoms for stability, deterioration, or improvement     Problem: Skin/Tissue Integrity  Goal: Absence of new skin breakdown  Description: 1.  Monitor for areas of redness and/or skin breakdown  2.  Assess vascular access sites  hourly  3.  Every 4-6 hours minimum:  Change oxygen saturation probe site  4.  Every 4-6 hours:  If on nasal continuous positive airway pressure, respiratory therapy assess nares and determine need for appliance change or resting period.  Outcome: Progressing     Problem: ABCDS Injury Assessment  Goal: Absence of physical injury  Outcome: Progressing  Flowsheets (Taken 1/22/2024 0016)  Absence of Physical Injury: Implement safety measures based on patient assessment     Problem: Nutrition Deficit:  Goal: Optimize nutritional status  Outcome: Progressing

## 2024-01-22 NOTE — PROGRESS NOTES
Trauma/Surgical Critical Care Sign Out Note:       Code Status: Full Code    Mode of provider to provider communication:        [x] Via telephone   [] In person     Date and time of sign-out: 1/22/2024 7:59 AM     Criteria Met for Transfer:  [x]   Oxygen saturation is > 90% on FiO2< 50% (exceptions may be made for patient pathophysiology)    []   Vital signs remain at or near baseline without pharmaceutical adjuncts, blood products, or > 2L fluid bolus in the last 24 hours (Hemoglobin slowly downtrending over the last few days. Required 1u pRBC this AM for Hgb 6.6. Pt remains hemodynamically stable off pressors. No concerns for hemorrhage.)  [x]   No suspicion or evidence of a new untreated infection (confusion, cool or cyanotic extremities, poor capillary refill, metabolic acidosis, low urine output)  [x]   Stable GCS, seizures controlled, no invasive neurological monitoring   [x]   Altered mental status is stable and able to be safely managed outside the ICU  [x]   No deterioration in renal function in the last 24 hours (creatinine > 50% increase, new onset oliguria)  [x]   Patient care needs do not exceed the capabilities of the unit they are being transferred to (suctioning needs, glucose monitoring, neurological monitoring, neurovascular checks, vital sign monitoring, I&O monitoring, drain management  [x]   No longer requiring mechanical ventilation via endotracheal intubation and/or decreasing O2/CPAP requirements  [x]   No need for medications that cannot be administered outside the ICU      Reason for ICU admission:  Necrotizing fasciitis of the abdomen requiring extensive abdominal debridement. Required vasopressor support to maintain hemodynamic stability.     ICU course summary:  1/1: Abdominal necrotizing soft tissue infection  1/2: OR-partial gastrectomy, extensive lysis of adhesions, explant of mesh. AFib  with RVR-dig given. IV amio  1/3: OR washout, bridging mesh placed, wound vac applied, G tube  Min: 69.8 °F (21 °C)  Max: 98.1 °F (36.7 °C) BP Systolic (24hrs), Av , Min:91 , Max:120   Diastolic (24hrs), Av, Min:41, Max:96   Pulse Pulse  Av.3  Min: 54  Max: 83 Resp Resp  Av.3  Min: 11  Max: 32 Pulse ox SpO2  Av %  Min: 91 %  Max: 100 %    Consults:  Cardiology   Atrial fibrillation   Rate controlled on PO Amiodarone    Infectious Disease   + Pseudomonas sputum cx   Zerbaxa (-)   Pulm toilet      Transfer  Checklist:  [x]   Vital signs changed to q4 hours x 48 hours, continuous telemetry x 48 hours  [x]   Provider assessment BID x 48 hours  [x]   Daily labs x 48 hours  [x]   Medications/orders reviewed  [x]   Updated photographs of wounds   [x]   Wound dressing orders placed if applicable    Plan and recommended follow-up:    Vital signs per unit protocol  Wean pain meds as tolerated  Cont Amiodarone 200mg  Cont Midodrine 15mg TID  PRVC with daily CPAP as tolerated  Trend LFTs  Clemons care, monitor UOP  Glucose control with HDSS  Zerbaxa until   Pulm toilet  To OR for wound vac exchange, debridement, possible skin substitute placement     Dispo: Plan for return to LTAC once confirmed he can tolerated bedside wound vac changes.     Electronically signed by Cat Yoo DO on 2024 at 7:59 AM

## 2024-01-22 NOTE — CARE COORDINATION
TRANSITIONAL CARE PLANNING/ DISCHARGE ONGOING EVALUATION    Hospital Day: 21    Reason for Admission: Atrial fibrillation with RVR ([facility]) [I48.91]  Septic shock ([facility]) [A41.9, R65.21]     Treatment Plan of Care:     Tests/Procedures still needed: OR Friday 1/26 for wound vac change, possible debridement, possible skin substitute placement.     Barriers to Discharge: as above.     Readmission Risk              Risk of Unplanned Readmission:  59            Patient goals/Treatment Preferences/Transitional Plan:     Referrals Made: Eamon Yap - accepted. Came from [facility]. Pt 30 day bed hold for [facility].    Follow Up needed: Pt 30 day bed hold for [facility]. To call Jacqueline from [facility] before discharge. [phone].

## 2024-01-22 NOTE — PROGRESS NOTES
ICU PROGRESS NOTE        PATIENT NAME: Ruben Dimas  MEDICAL RECORD NO. [id number]  DATE: 1/22/2024    HD: # 21    ASSESSMENT    Patient Active Problem List   Diagnosis    Alcoholic cirrhosis of liver (HCC), sober since 2013    Peripheral edema    Bipolar disorder (HCC)    Type 2 diabetes mellitus with diabetic neuropathy, with long-term current use of insulin (HCC)    Essential hypertension, currently hypotensive    Dyslipidemia    Weakness    Acute metabolic encephalopathy    FABI (obstructive sleep apnea)    Primary osteoarthritis of right knee    Type 2 diabetes mellitus with diabetic neuropathy (HCC)    Acquired hypothyroidism    Urine retention    Anemia    Slow transit constipation    Iron deficiency anemia due to chronic blood loss    Gastroesophageal reflux disease without esophagitis    LEONARDA (acute kidney injury) (HCC)    Secondary esophageal varices without bleeding (HCC)    Portal hypertension (HCC)    Obesity, morbid, BMI 50 or higher (HCC)    Venous stasis dermatitis of both lower extremities    Cellulitis of perineum    Chronic indwelling Clemons catheter    Anxiety and depression    Back pain, chronic    BPH (benign prostatic hyperplasia)    Chronic diastolic CHF (congestive heart failure) (HCC)    Cirrhosis (HCC)    Diabetes mellitus (HCC)    GERD (gastroesophageal reflux disease)    Hepatitis    Hiatal hernia    Hypertension, essential    IBS (irritable bowel syndrome)    Morbid obesity with BMI of 45.0-49.9, adult (HCC)    Neuropathy    Chronic respiratory failure with hypoxia, on home oxygen therapy (HCC)    Sleep apnea    Thyroid disease    Urinary bladder neurogenic dysfunction    Acute UTI    Musculoskeletal immobility    Pyocystis    Myoclonic jerking    Thrombocytopenia (HCC)    Hypotension    Sepsis with acute hypercapnic respiratory failure and septic shock (HCC)    Right lower lobe pneumonia    Acute kidney injury superimposed on CKD (HCC)    Somnolence    Acute respiratory acidosis (HCC)

## 2024-01-23 ENCOUNTER — APPOINTMENT (OUTPATIENT)
Dept: GENERAL RADIOLOGY | Age: 59
DRG: 853 | End: 2024-01-23
Payer: MEDICARE

## 2024-01-23 LAB
ABO/RH: NORMAL
ALBUMIN SERPL-MCNC: 2.1 G/DL (ref 3.5–5.2)
ALBUMIN/GLOB SERPL: 0.6 {RATIO} (ref 1–2.5)
ALP SERPL-CCNC: 810 U/L (ref 40–129)
ALT SERPL-CCNC: 18 U/L (ref 5–41)
ANION GAP SERPL CALCULATED.3IONS-SCNC: 10 MMOL/L (ref 9–17)
ANTIBODY SCREEN: NEGATIVE
ARM BAND NUMBER: NORMAL
AST SERPL-CCNC: 26 U/L
BASOPHILS # BLD: 0 K/UL (ref 0–0.2)
BASOPHILS NFR BLD: 0 % (ref 0–2)
BILIRUB DIRECT SERPL-MCNC: 0.4 MG/DL
BILIRUB INDIRECT SERPL-MCNC: 0.3 MG/DL (ref 0–1)
BILIRUB SERPL-MCNC: 0.7 MG/DL (ref 0.3–1.2)
BLOOD BANK BLOOD PRODUCT EXPIRATION DATE: NORMAL
BLOOD BANK DISPENSE STATUS: NORMAL
BLOOD BANK ISBT PRODUCT BLOOD TYPE: 6200
BLOOD BANK PRODUCT CODE: NORMAL
BLOOD BANK SAMPLE EXPIRATION: NORMAL
BLOOD BANK UNIT TYPE AND RH: NORMAL
BPU ID: NORMAL
BUN SERPL-MCNC: 48 MG/DL (ref 6–20)
CA-I BLD-SCNC: 1.32 MMOL/L (ref 1.13–1.33)
CALCIUM SERPL-MCNC: 8.8 MG/DL (ref 8.6–10.4)
CHLORIDE SERPL-SCNC: 118 MMOL/L (ref 98–107)
CO2 SERPL-SCNC: 17 MMOL/L (ref 20–31)
COMPONENT: NORMAL
CREAT SERPL-MCNC: 1.3 MG/DL (ref 0.7–1.2)
CROSSMATCH RESULT: NORMAL
EOSINOPHIL # BLD: 0 K/UL (ref 0–0.4)
EOSINOPHILS RELATIVE PERCENT: 0 % (ref 1–4)
ERYTHROCYTE [DISTWIDTH] IN BLOOD BY AUTOMATED COUNT: 19.5 % (ref 11.8–14.4)
GFR SERPL CREATININE-BSD FRML MDRD: >60 ML/MIN/1.73M2
GLUCOSE BLD-MCNC: 146 MG/DL (ref 75–110)
GLUCOSE BLD-MCNC: 168 MG/DL (ref 75–110)
GLUCOSE BLD-MCNC: 176 MG/DL (ref 75–110)
GLUCOSE SERPL-MCNC: 187 MG/DL (ref 70–99)
HCT VFR BLD AUTO: 24.9 % (ref 40.7–50.3)
HGB BLD-MCNC: 7.7 G/DL (ref 13–17)
IMM GRANULOCYTES # BLD AUTO: 0.43 K/UL (ref 0–0.3)
IMM GRANULOCYTES NFR BLD: 3 %
LYMPHOCYTES NFR BLD: 0.86 K/UL (ref 1–4.8)
LYMPHOCYTES RELATIVE PERCENT: 6 % (ref 24–44)
MAGNESIUM SERPL-MCNC: 2.7 MG/DL (ref 1.6–2.6)
MCH RBC QN AUTO: 29.7 PG (ref 25.2–33.5)
MCHC RBC AUTO-ENTMCNC: 30.9 G/DL (ref 28.4–34.8)
MCV RBC AUTO: 96.1 FL (ref 82.6–102.9)
MONOCYTES NFR BLD: 0.86 K/UL (ref 0.1–0.8)
MONOCYTES NFR BLD: 6 % (ref 1–7)
MORPHOLOGY: ABNORMAL
NEUTROPHILS NFR BLD: 85 % (ref 36–66)
NEUTS SEG NFR BLD: 12.25 K/UL (ref 1.8–7.7)
NRBC BLD-RTO: 0.1 PER 100 WBC
PHOSPHATE SERPL-MCNC: 6.7 MG/DL (ref 2.5–4.5)
PLATELET # BLD AUTO: 166 K/UL (ref 138–453)
PMV BLD AUTO: 9.5 FL (ref 8.1–13.5)
POTASSIUM SERPL-SCNC: 4.2 MMOL/L (ref 3.7–5.3)
PROT SERPL-MCNC: 5.8 G/DL (ref 6.4–8.3)
RBC # BLD AUTO: 2.59 M/UL (ref 4.21–5.77)
SODIUM SERPL-SCNC: 145 MMOL/L (ref 135–144)
TRANSFUSION STATUS: NORMAL
UNIT DIVISION: 0
UNIT ISSUE DATE/TIME: NORMAL
WBC OTHER # BLD: 14.4 K/UL (ref 3.5–11.3)

## 2024-01-23 PROCEDURE — 6370000000 HC RX 637 (ALT 250 FOR IP)

## 2024-01-23 PROCEDURE — 6370000000 HC RX 637 (ALT 250 FOR IP): Performed by: STUDENT IN AN ORGANIZED HEALTH CARE EDUCATION/TRAINING PROGRAM

## 2024-01-23 PROCEDURE — 99233 SBSQ HOSP IP/OBS HIGH 50: CPT | Performed by: SURGERY

## 2024-01-23 PROCEDURE — 6360000002 HC RX W HCPCS

## 2024-01-23 PROCEDURE — 71045 X-RAY EXAM CHEST 1 VIEW: CPT

## 2024-01-23 PROCEDURE — 85025 COMPLETE CBC W/AUTO DIFF WBC: CPT

## 2024-01-23 PROCEDURE — 84100 ASSAY OF PHOSPHORUS: CPT

## 2024-01-23 PROCEDURE — 2060000000 HC ICU INTERMEDIATE R&B

## 2024-01-23 PROCEDURE — 82330 ASSAY OF CALCIUM: CPT

## 2024-01-23 PROCEDURE — 2580000003 HC RX 258: Performed by: STUDENT IN AN ORGANIZED HEALTH CARE EDUCATION/TRAINING PROGRAM

## 2024-01-23 PROCEDURE — 80048 BASIC METABOLIC PNL TOTAL CA: CPT

## 2024-01-23 PROCEDURE — 99232 SBSQ HOSP IP/OBS MODERATE 35: CPT | Performed by: INTERNAL MEDICINE

## 2024-01-23 PROCEDURE — 94003 VENT MGMT INPAT SUBQ DAY: CPT

## 2024-01-23 PROCEDURE — 36415 COLL VENOUS BLD VENIPUNCTURE: CPT

## 2024-01-23 PROCEDURE — 2580000003 HC RX 258

## 2024-01-23 PROCEDURE — 82947 ASSAY GLUCOSE BLOOD QUANT: CPT

## 2024-01-23 PROCEDURE — 83735 ASSAY OF MAGNESIUM: CPT

## 2024-01-23 PROCEDURE — 94761 N-INVAS EAR/PLS OXIMETRY MLT: CPT

## 2024-01-23 PROCEDURE — 2700000000 HC OXYGEN THERAPY PER DAY

## 2024-01-23 PROCEDURE — 80076 HEPATIC FUNCTION PANEL: CPT

## 2024-01-23 RX ORDER — OXYCODONE HYDROCHLORIDE 5 MG/1
10 TABLET ORAL EVERY 6 HOURS PRN
Status: DISCONTINUED | OUTPATIENT
Start: 2024-01-23 | End: 2024-01-23

## 2024-01-23 RX ORDER — OXYCODONE HYDROCHLORIDE 5 MG/1
5 TABLET ORAL EVERY 6 HOURS PRN
Status: DISCONTINUED | OUTPATIENT
Start: 2024-01-23 | End: 2024-01-31 | Stop reason: HOSPADM

## 2024-01-23 RX ORDER — SODIUM CHLORIDE, SODIUM LACTATE, POTASSIUM CHLORIDE, AND CALCIUM CHLORIDE .6; .31; .03; .02 G/100ML; G/100ML; G/100ML; G/100ML
1000 INJECTION, SOLUTION INTRAVENOUS ONCE
Status: COMPLETED | OUTPATIENT
Start: 2024-01-23 | End: 2024-01-23

## 2024-01-23 RX ORDER — AMIODARONE HYDROCHLORIDE 200 MG/1
200 TABLET ORAL DAILY
Status: DISCONTINUED | OUTPATIENT
Start: 2024-01-23 | End: 2024-01-31 | Stop reason: HOSPADM

## 2024-01-23 RX ORDER — INSULIN LISPRO 100 [IU]/ML
0-16 INJECTION, SOLUTION INTRAVENOUS; SUBCUTANEOUS EVERY 4 HOURS
Status: DISCONTINUED | OUTPATIENT
Start: 2024-01-23 | End: 2024-01-31 | Stop reason: HOSPADM

## 2024-01-23 RX ORDER — FENTANYL CITRATE 50 UG/ML
50 INJECTION, SOLUTION INTRAMUSCULAR; INTRAVENOUS ONCE
Status: COMPLETED | OUTPATIENT
Start: 2024-01-23 | End: 2024-01-23

## 2024-01-23 RX ORDER — OXYCODONE HYDROCHLORIDE 5 MG/1
5 TABLET ORAL EVERY 4 HOURS PRN
Status: DISCONTINUED | OUTPATIENT
Start: 2024-01-23 | End: 2024-01-23

## 2024-01-23 RX ORDER — OXYCODONE HYDROCHLORIDE 5 MG/1
10 TABLET ORAL EVERY 6 HOURS PRN
Status: DISCONTINUED | OUTPATIENT
Start: 2024-01-23 | End: 2024-01-31 | Stop reason: HOSPADM

## 2024-01-23 RX ADMIN — ASPIRIN 81 MG 81 MG: 81 TABLET ORAL at 09:10

## 2024-01-23 RX ADMIN — SODIUM CHLORIDE, PRESERVATIVE FREE 10 ML: 5 INJECTION INTRAVENOUS at 09:10

## 2024-01-23 RX ADMIN — INSULIN LISPRO 4 UNITS: 100 INJECTION, SOLUTION INTRAVENOUS; SUBCUTANEOUS at 09:56

## 2024-01-23 RX ADMIN — HYDROMORPHONE HYDROCHLORIDE 0.5 MG: 1 INJECTION, SOLUTION INTRAMUSCULAR; INTRAVENOUS; SUBCUTANEOUS at 03:46

## 2024-01-23 RX ADMIN — CEFTOLOZANE AND TAZOBACTAM 3000 MG: 1; .5 INJECTION, POWDER, LYOPHILIZED, FOR SOLUTION INTRAVENOUS at 04:34

## 2024-01-23 RX ADMIN — FENTANYL CITRATE 50 MCG: 50 INJECTION, SOLUTION INTRAMUSCULAR; INTRAVENOUS at 02:08

## 2024-01-23 RX ADMIN — CEFTOLOZANE AND TAZOBACTAM 3000 MG: 1; .5 INJECTION, POWDER, LYOPHILIZED, FOR SOLUTION INTRAVENOUS at 13:07

## 2024-01-23 RX ADMIN — CITALOPRAM 30 MG: 20 TABLET, FILM COATED ORAL at 13:07

## 2024-01-23 RX ADMIN — OXYCODONE HYDROCHLORIDE 10 MG: 5 TABLET ORAL at 14:10

## 2024-01-23 RX ADMIN — LANSOPRAZOLE 30 MG: 30 TABLET, ORALLY DISINTEGRATING, DELAYED RELEASE ORAL at 05:19

## 2024-01-23 RX ADMIN — OXYCODONE HYDROCHLORIDE 10 MG: 5 TABLET ORAL at 00:44

## 2024-01-23 RX ADMIN — OXYCODONE HYDROCHLORIDE 10 MG: 5 TABLET ORAL at 09:23

## 2024-01-23 RX ADMIN — DOCUSATE SODIUM LIQUID 100 MG: 100 LIQUID ORAL at 20:27

## 2024-01-23 RX ADMIN — OXYCODONE HYDROCHLORIDE 10 MG: 5 TABLET ORAL at 20:27

## 2024-01-23 RX ADMIN — HYDROMORPHONE HYDROCHLORIDE 0.5 MG: 1 INJECTION, SOLUTION INTRAMUSCULAR; INTRAVENOUS; SUBCUTANEOUS at 16:03

## 2024-01-23 RX ADMIN — SODIUM CHLORIDE, POTASSIUM CHLORIDE, SODIUM LACTATE AND CALCIUM CHLORIDE 1000 ML: 600; 310; 30; 20 INJECTION, SOLUTION INTRAVENOUS at 09:58

## 2024-01-23 RX ADMIN — DOCUSATE SODIUM LIQUID 100 MG: 100 LIQUID ORAL at 09:10

## 2024-01-23 RX ADMIN — MIDODRINE HYDROCHLORIDE 15 MG: 5 TABLET ORAL at 12:00

## 2024-01-23 RX ADMIN — SODIUM CHLORIDE, PRESERVATIVE FREE 10 ML: 5 INJECTION INTRAVENOUS at 20:27

## 2024-01-23 RX ADMIN — HEPARIN SODIUM 7500 UNITS: 5000 INJECTION INTRAVENOUS; SUBCUTANEOUS at 13:07

## 2024-01-23 RX ADMIN — HEPARIN SODIUM 7500 UNITS: 5000 INJECTION INTRAVENOUS; SUBCUTANEOUS at 20:27

## 2024-01-23 RX ADMIN — HYDROMORPHONE HYDROCHLORIDE 0.5 MG: 1 INJECTION, SOLUTION INTRAMUSCULAR; INTRAVENOUS; SUBCUTANEOUS at 11:51

## 2024-01-23 RX ADMIN — HEPARIN SODIUM 7500 UNITS: 5000 INJECTION INTRAVENOUS; SUBCUTANEOUS at 05:31

## 2024-01-23 RX ADMIN — MIDODRINE HYDROCHLORIDE 15 MG: 5 TABLET ORAL at 09:10

## 2024-01-23 RX ADMIN — AMIODARONE HYDROCHLORIDE 200 MG: 200 TABLET ORAL at 09:18

## 2024-01-23 RX ADMIN — HYDROMORPHONE HYDROCHLORIDE 0.5 MG: 1 INJECTION, SOLUTION INTRAMUSCULAR; INTRAVENOUS; SUBCUTANEOUS at 23:34

## 2024-01-23 RX ADMIN — SENNOSIDES 8.8 MG: 8.8 LIQUID ORAL at 20:27

## 2024-01-23 RX ADMIN — CEFTOLOZANE AND TAZOBACTAM 3000 MG: 1; .5 INJECTION, POWDER, LYOPHILIZED, FOR SOLUTION INTRAVENOUS at 20:30

## 2024-01-23 RX ADMIN — LANSOPRAZOLE 30 MG: 30 TABLET, ORALLY DISINTEGRATING, DELAYED RELEASE ORAL at 16:57

## 2024-01-23 RX ADMIN — HYDROMORPHONE HYDROCHLORIDE 0.5 MG: 1 INJECTION, SOLUTION INTRAMUSCULAR; INTRAVENOUS; SUBCUTANEOUS at 18:39

## 2024-01-23 RX ADMIN — OXYCODONE HYDROCHLORIDE 10 MG: 5 TABLET ORAL at 05:18

## 2024-01-23 RX ADMIN — SENNOSIDES 8.8 MG: 8.8 LIQUID ORAL at 09:12

## 2024-01-23 RX ADMIN — ALPRAZOLAM 0.25 MG: 0.25 TABLET ORAL at 21:55

## 2024-01-23 RX ADMIN — MIDODRINE HYDROCHLORIDE 15 MG: 5 TABLET ORAL at 16:57

## 2024-01-23 RX ADMIN — LEVOTHYROXINE SODIUM 200 MCG: 100 TABLET ORAL at 05:18

## 2024-01-23 ASSESSMENT — PAIN SCALES - GENERAL
PAINLEVEL_OUTOF10: 9
PAINLEVEL_OUTOF10: 9
PAINLEVEL_OUTOF10: 8
PAINLEVEL_OUTOF10: 8
PAINLEVEL_OUTOF10: 6
PAINLEVEL_OUTOF10: 7
PAINLEVEL_OUTOF10: 6
PAINLEVEL_OUTOF10: 7
PAINLEVEL_OUTOF10: 0
PAINLEVEL_OUTOF10: 8
PAINLEVEL_OUTOF10: 8
PAINLEVEL_OUTOF10: 10

## 2024-01-23 ASSESSMENT — PULMONARY FUNCTION TESTS
PIF_VALUE: 15
PIF_VALUE: 25
PIF_VALUE: 22
PIF_VALUE: 15

## 2024-01-23 ASSESSMENT — PAIN DESCRIPTION - DESCRIPTORS
DESCRIPTORS: PRESSURE
DESCRIPTORS: STABBING

## 2024-01-23 ASSESSMENT — PAIN DESCRIPTION - LOCATION
LOCATION: ABDOMEN

## 2024-01-23 ASSESSMENT — PAIN DESCRIPTION - ORIENTATION: ORIENTATION: MID

## 2024-01-23 NOTE — PROGRESS NOTES
Comprehensive Nutrition Assessment    Type and Reason for Visit:  Reassess    Nutrition Recommendations/Plan:   Continue current TF: Immune-enhancing at 55 mL/hr continuous.   Monitor TF tolerance.     Malnutrition Assessment:  Malnutrition Status:  Insufficient data (01/03/24 1416)    Context:  Acute Illness     Findings of the 6 clinical characteristics of malnutrition:  Energy Intake:  Mild decrease in energy intake (Comment)  Weight Loss:  7.5% over 3 months     Body Fat Loss:  Unable to assess     Muscle Mass Loss:  Unable to assess    Fluid Accumulation:  Mild (to moderate) Extremities, Generalized   Strength:  Not Performed    Nutrition Assessment:    Pt +trach/+PEG. RN reports pt tolerating TF at goal rate this morning, notes pt had some nausea overnight and rate was reduced. Noted plan for OR Friday for wound vac change, debridement, and possible skin closure. Pt would be appropriate for higher rate of TF to better meet needs; however, BUN has been trending up, currently BUN = 48 mg/dL, will maintain current TF and monitor kidney function. LBM 1/22. +2 generalized/BUE, +2 pitting BLE edema noted (increasing).    Nutrition Related Findings:    labs/meds reviewed Wound Type: Multiple, Skin Tears, Moisture Associate Skin Damage, Surgical Incision, Wound Vac       Current Nutrition Intake & Therapies:    Average Meal Intake: NPO  Average Supplements Intake: NPO  Diet NPO Exceptions are: Sips of Water with Meds  ADULT TUBE FEEDING; Nasogastric; Immune Enhancing; Continuous; 55; No; 200; Q 6 hours  Current Tube Feeding (TF) Orders:  Feeding Route: PEG  Formula: Immune Enhancing  Schedule: Continuous  Feeding Regimen: 55 mL/hr  Additives/Modulars: None  Water Flushes: 200 mL Q6H  Current TF & Flush Orders Provides: Immune Enhancing goal 55 mL/hr = 1980 kcals, 124 gm protein.  Goal TF & Flush Orders Provides: Immune Enhancing goal 70 mL/hr = 2520 kcals, 158 gm protein.    Anthropometric Measures:  Height: 177.8

## 2024-01-23 NOTE — PLAN OF CARE
Problem: Respiratory - Adult  Goal: Achieves optimal ventilation and oxygenation  Outcome: Progressing  BRONCHOSPASM/BRONCHOCONSTRICTION     [x]         IMPROVE AERATION/BREATH SOUNDS  [x]   ADMINISTER BRONCHODILATOR THERAPY AS APPROPRIATE  [x]   ASSESS BREATH SOUNDS  [x]   IMPLEMENT AEROSOL/MDI PROTOCOL  [x]   PATIENT EDUCATION AS NEEDED  Problem: OXYGENATION/RESPIRATORY FUNCTION  Goal: Patient will maintain patent airway  Outcome: Ongoing  Goal: Patient will achieve/maintain normal respiratory rate/effort  Respiratory rate and effort will be within normal limits for the patient  Outcome: Ongoing    Problem: MECHANICAL VENTILATION  Goal: Patient will maintain patent airway  Outcome: Ongoing  Goal: Oral health is maintained or improved  Outcome: Ongoing  Goal:  Trach tube will be managed safely  Outcome: Ongoing  Goal: Ability to express needs and understand communication  Outcome: Ongoing  Goal: Mobility/activity is maintained at optimum level for patient  Outcome: Ongoing    Problem: ASPIRATION PRECAUTIONS  Goal: Patient’s risk of aspiration is minimized  Outcome: Ongoing    Problem: SKIN INTEGRITY  Goal: Skin integrity is maintained or improved  Outcome: Ongoing

## 2024-01-23 NOTE — PROGRESS NOTES
PROGRESS NOTE          PATIENT NAME: Ruben Dimas  MEDICAL RECORD NO. [id number]  DATE: 1/23/2024  SURGEON: Jake  PRIMARY CARE PHYSICIAN: Ramy Lazo MD    HD: # 22    ASSESSMENT    Patient Active Problem List   Diagnosis    Alcoholic cirrhosis of liver (HCC), sober since 2013    Peripheral edema    Bipolar disorder (HCC)    Type 2 diabetes mellitus with diabetic neuropathy, with long-term current use of insulin (HCC)    Essential hypertension, currently hypotensive    Dyslipidemia    Weakness    Acute metabolic encephalopathy    FABI (obstructive sleep apnea)    Primary osteoarthritis of right knee    Type 2 diabetes mellitus with diabetic neuropathy (HCC)    Acquired hypothyroidism    Urine retention    Anemia    Slow transit constipation    Iron deficiency anemia due to chronic blood loss    Gastroesophageal reflux disease without esophagitis    LEONARDA (acute kidney injury) (HCC)    Secondary esophageal varices without bleeding (HCC)    Portal hypertension (HCC)    Obesity, morbid, BMI 50 or higher (HCC)    Venous stasis dermatitis of both lower extremities    Cellulitis of perineum    Chronic indwelling Clemons catheter    Anxiety and depression    Back pain, chronic    BPH (benign prostatic hyperplasia)    Chronic diastolic CHF (congestive heart failure) (HCC)    Cirrhosis (HCC)    Diabetes mellitus (HCC)    GERD (gastroesophageal reflux disease)    Hepatitis    Hiatal hernia    Hypertension, essential    IBS (irritable bowel syndrome)    Morbid obesity with BMI of 45.0-49.9, adult (HCC)    Neuropathy    Chronic respiratory failure with hypoxia, on home oxygen therapy (HCC)    Sleep apnea    Thyroid disease    Urinary bladder neurogenic dysfunction    Acute UTI    Musculoskeletal immobility    Pyocystis    Myoclonic jerking    Thrombocytopenia (HCC)    Hypotension    Sepsis with acute hypercapnic respiratory failure and septic shock (HCC)    Right lower lobe pneumonia    Acute kidney injury superimposed on  CKD (HCC)    Somnolence    Acute respiratory acidosis (HCC)    Lymphedema of both lower extremities    Venous stasis    Acute GI bleeding    PAF (paroxysmal atrial fibrillation) ([facility])    Secondary hypercoagulable state (HCC)    Anemia due to acute blood loss    Altered mental status    Intertriginous dermatitis associated with moisture    Dermatitis associated with moisture    Hyponatremia    Wounds, multiple    Sepsis due to Proteus species ([facility])    Hyperkalemia    Hypoglycemia    Acute on chronic respiratory failure with hypoxia ([facility])    Encounter for palliative care    Refeeding syndrome    Septic shock (HCC)    Acute heart failure with preserved ejection fraction (HFpEF) ([facility])    Acute cystitis without hematuria    S/P percutaneous endoscopic gastrostomy (PEG) tube placement ([facility])    Necrotizing soft tissue infection    Lactic acidosis    CRP elevated    Bandemia    Goals of care, counseling/discussion    Atrial fibrillation with RVR ([facility])    Dislodged gastrostomy tube    Pneumonia of left lower lobe due to Pseudomonas species ([facility])    Leukemoid reaction    Chronic diastolic heart failure (HCC)    Acute heart failure with preserved ejection fraction (HCC)       MEDICAL DECISION MAKING AND PLAN    Neuro  MMPT: Tylenol 1000 mg every 8 hours as needed, Roxicodone pain panel every 6 hours.  Dilaudid 0.5 mg every 3 hours as needed, Xanax as needed  Resume home celexa   CV amiodarone daily, midodrine 15 mg 3 times daily  Heme: Monitor hemoglobin, DVT ppx-heparin 7,500 units 3 times daily  Pulm chronic tracheostomy in place   Daily CXR   Albuterol nebulizer every 2 hours as needed  PRBC, rate of 14, tidal volume 600 PEEP of 8.  CPAP trials daily  Renal: wells in place  Monitor UOP  Addition of free water flushes for hypernatremia   GI tube feeds via gastrostomy tube goal rate of 55 mL an hour, addition of free water flushes 600 cc every 4 hours  Bowel regimen  Prevacid twice daily  ID Pseudomonas pneumonia Zerbaxa

## 2024-01-23 NOTE — PLAN OF CARE
Problem: Discharge Planning  Goal: Discharge to home or other facility with appropriate resources  Outcome: Progressing  Flowsheets (Taken 1/22/2024 2000 by Luis Tafoya RN)  Discharge to home or other facility with appropriate resources: Identify barriers to discharge with patient and caregiver     Problem: Pain  Goal: Verbalizes/displays adequate comfort level or baseline comfort level  Outcome: Progressing  Flowsheets (Taken 1/22/2024 2000 by Luis Tafoya RN)  Verbalizes/displays adequate comfort level or baseline comfort level: Assess pain using appropriate pain scale     Problem: Safety - Adult  Goal: Free from fall injury  Outcome: Progressing  Flowsheets (Taken 1/22/2024 2302 by Luis Tafoya RN)  Free From Fall Injury: Based on caregiver fall risk screen, instruct family/caregiver to ask for assistance with transferring infant if caregiver noted to have fall risk factors     Problem: Respiratory - Adult  Goal: Achieves optimal ventilation and oxygenation  1/22/2024 2326 by Luis Dorman RN  Outcome: Progressing  1/22/2024 2029 by Reji Weiner RCP  Outcome: Progressing  Flowsheets  Taken 1/22/2024 2000 by Luis Tafoya RN  Achieves optimal ventilation and oxygenation: Assess for changes in respiratory status  Taken 1/22/2024 1132 by Yolanda Gay RCP  Achieves optimal ventilation and oxygenation:   Assess for changes in respiratory status   Assess for changes in mentation and behavior   Position to facilitate oxygenation and minimize respiratory effort   Oxygen supplementation based on oxygen saturation or arterial blood gases   Initiate smoking cessation protocol as indicated   Encourage broncho-pulmonary hygiene including cough, deep breathe, incentive spirometry   Assess the need for suctioning and aspirate as needed   Assess and instruct to report shortness of breath or any respiratory difficulty   Respiratory therapy support as indicated     Problem: Chronic Conditions and

## 2024-01-23 NOTE — PROGRESS NOTES
Wound care completed with drainage bag placed to RLQ of abdomen. No drainage noted from stoma bag placed over ALBIN drain incisions. VS stable will continue to monitor.

## 2024-01-23 NOTE — PLAN OF CARE
Problem: Respiratory - Adult  Goal: Achieves optimal ventilation and oxygenation  1/23/2024 0856 by Sabine Bruner RCP  Outcome: Progressing  1/23/2024 0524 by Seema Ramirez RCP  Outcome: Progressing  1/22/2024 2326 by Luis Dorman RN  Outcome: Progressing  1/22/2024 2029 by Reji Weiner RCP  Outcome: Progressing  Flowsheets  Taken 1/22/2024 2000 by Luis Tafoya RN  Achieves optimal ventilation and oxygenation: Assess for changes in respiratory status  Taken 1/22/2024 1132 by Yolanda Gay RCP  Achieves optimal ventilation and oxygenation:   Assess for changes in respiratory status   Assess for changes in mentation and behavior   Position to facilitate oxygenation and minimize respiratory effort   Oxygen supplementation based on oxygen saturation or arterial blood gases   Initiate smoking cessation protocol as indicated   Encourage broncho-pulmonary hygiene including cough, deep breathe, incentive spirometry   Assess the need for suctioning and aspirate as needed   Assess and instruct to report shortness of breath or any respiratory difficulty   Respiratory therapy support as indicated

## 2024-01-23 NOTE — PROGRESS NOTES
PREPARATION OF WOUND BED, PLACEMENT OF SKIN SUBSTITUTE, WOUND VAC PLACEMENT, IRRIGATION AND DEBRIDEMENT OF RIGHT LOWER ABDOMINAL WOUND performed by Stephanie Joseph MD at [facility] OR    ABDOMEN SURGERY N/A 1/8/2024    WOUND VAC EXCHANGE, GRAFT PREPERATION 54X47 AND NWP GREATER THAN 50 SQ CM performed by Escobar Ulloa MD at [facility] OR    ABDOMEN SURGERY N/A 1/13/2024    TAKE BACK ABDOMINAL  WOUND DEBRIDEMENT, WOUND BED PREPARATION, WOUND VAC CHANGE  (MISONIX) performed by Mary Carmen Monte MD at [facility] OR    ABDOMEN SURGERY N/A 1/15/2024    TAKE BACK ABDOMINAL  WOUND DEBRIDEMENT,  WOUND VAC CHANGE performed by Fazal Wood MD at [facility] OR    ABDOMEN SURGERY N/A 1/18/2024    TAKE BACK ABDOMINAL WOUND DEBRIDEMENT, WOUND BED PREPARATION, WOUND VAC PLACEMENT  (MISONIX) performed by Fazal Wood MD at [facility] OR    ABDOMEN SURGERY N/A 1/21/2024    ABDOMEN WOUND RE-EXPLORATION AND DEBRIDEMENT, INTEGRA PLACEMENT performed by Fazal Wood MD at [facility] OR    COLONOSCOPY      2012    COLONOSCOPY N/A 04/19/2023    COLONOSCOPY DIAGNOSTIC performed by Dorian Rendon MD at [facility] OR    COLONOSCOPY  04/20/2023    COLONOSCOPY N/A 4/20/2023    COLONOSCOPY DIAGNOSTIC performed by Dorian Rendon MD at [facility] OR    CYSTOSCOPY  01/24/2018    CYSTOSCOPY  02/20/2019    CYSTOSCOPY N/A 02/20/2019    CYSTOSCOPY DILATION performed by Fazal Guallpa MD at [facility] OR    CYSTOSCOPY N/A 08/23/2019    CYSTOSCOPY/ DILATION NICOLE CATHETER EXCHANGE performed by Fazal Guallpa MD at [facility] OR    CYSTOSCOPY N/A 06/08/2022    CYSTOSCOPY, REMOVAL OF SMALL BLADDER STONE AND BLADDER IRRIGATION performed by Fazal Guallpa MD at [facility] OR    ENDOSCOPY, COLON, DIAGNOSTIC      HERNIA REPAIR      INCISIONAL HERNIA REPAIR  05/20/2018    repair and reduction of recurrent incarcerated incisional hernia with mesh    LAPAROTOMY N/A 1/2/2024    LAPAROTOMY EXPLORATORY, DEBRIDEMENT OF SUBQ TISSUE INCLUDING FASCIA 50X44, APPLICATION OF WOUND VAC >50 SQ  01/23/2024 05:48 AM    GLUCOSE 102 08/30/2011 02:20 PM       Detailed results:        Thank you for allowing us to participate in the care of this patient.Please call with questions.    This note is created with the assistance of a speech recognition program.  While intending to generate adocument that actually reflects the content of the visit, the document can still have some errors including those of syntax and sound a like substitutions which may escape proof reading.  It such instances, actual meaningcan be extrapolated by contextual diversion.      Office: [phone]  Perfect serve / office [phone]      Don Murry MD  Family Medicine Resident, PGY-2   01/23/24        I have discussed the care of the patient, including pertinent history and exam findings,  with the resident., student or CNP- I have seen and examined the patient and the key elements of all parts of the encounter have been performed by me.  I agree with the assessment, plan and orders as documented by the resident.student or CNP    Lurdes Muller, Infectious Diseases

## 2024-01-23 NOTE — PLAN OF CARE
Problem: Respiratory - Adult  Goal: Achieves optimal ventilation and oxygenation  1/23/2024 0524 by Seema Ramirez RCP  Outcome: Progressing

## 2024-01-24 ENCOUNTER — APPOINTMENT (OUTPATIENT)
Dept: GENERAL RADIOLOGY | Age: 59
DRG: 853 | End: 2024-01-24
Payer: MEDICARE

## 2024-01-24 LAB
ALBUMIN SERPL-MCNC: 2.1 G/DL (ref 3.5–5.2)
ALBUMIN/GLOB SERPL: 0.6 {RATIO} (ref 1–2.5)
ALP SERPL-CCNC: 811 U/L (ref 40–129)
ALT SERPL-CCNC: 17 U/L (ref 5–41)
ANION GAP SERPL CALCULATED.3IONS-SCNC: 10 MMOL/L (ref 9–17)
AST SERPL-CCNC: 27 U/L
BASOPHILS # BLD: 0 K/UL (ref 0–0.2)
BASOPHILS NFR BLD: 0 % (ref 0–2)
BILIRUB DIRECT SERPL-MCNC: 0.4 MG/DL
BILIRUB INDIRECT SERPL-MCNC: 0.3 MG/DL (ref 0–1)
BILIRUB SERPL-MCNC: 0.7 MG/DL (ref 0.3–1.2)
BUN SERPL-MCNC: 51 MG/DL (ref 6–20)
CA-I BLD-SCNC: 1.3 MMOL/L (ref 1.13–1.33)
CALCIUM SERPL-MCNC: 8.7 MG/DL (ref 8.6–10.4)
CHLORIDE SERPL-SCNC: 120 MMOL/L (ref 98–107)
CO2 SERPL-SCNC: 18 MMOL/L (ref 20–31)
CREAT SERPL-MCNC: 1.2 MG/DL (ref 0.7–1.2)
EOSINOPHIL # BLD: 0.09 K/UL (ref 0–0.44)
EOSINOPHILS RELATIVE PERCENT: 1 % (ref 1–4)
ERYTHROCYTE [DISTWIDTH] IN BLOOD BY AUTOMATED COUNT: 19.9 % (ref 11.8–14.4)
GFR SERPL CREATININE-BSD FRML MDRD: >60 ML/MIN/1.73M2
GLUCOSE BLD-MCNC: 131 MG/DL (ref 75–110)
GLUCOSE BLD-MCNC: 148 MG/DL (ref 75–110)
GLUCOSE BLD-MCNC: 166 MG/DL (ref 75–110)
GLUCOSE BLD-MCNC: 172 MG/DL (ref 75–110)
GLUCOSE BLD-MCNC: 175 MG/DL (ref 75–110)
GLUCOSE BLD-MCNC: 198 MG/DL (ref 75–110)
GLUCOSE SERPL-MCNC: 184 MG/DL (ref 70–99)
HCT VFR BLD AUTO: 23.5 % (ref 40.7–50.3)
HGB BLD-MCNC: 7.1 G/DL (ref 13–17)
IMM GRANULOCYTES # BLD AUTO: 0.53 K/UL (ref 0–0.3)
IMM GRANULOCYTES NFR BLD: 6 %
LYMPHOCYTES NFR BLD: 1.07 K/UL (ref 1.1–3.7)
LYMPHOCYTES RELATIVE PERCENT: 12 % (ref 24–43)
MAGNESIUM SERPL-MCNC: 2.5 MG/DL (ref 1.6–2.6)
MCH RBC QN AUTO: 29.5 PG (ref 25.2–33.5)
MCHC RBC AUTO-ENTMCNC: 30.2 G/DL (ref 28.4–34.8)
MCV RBC AUTO: 97.5 FL (ref 82.6–102.9)
MONOCYTES NFR BLD: 0.71 K/UL (ref 0.1–1.2)
MONOCYTES NFR BLD: 8 % (ref 3–12)
MORPHOLOGY: ABNORMAL
NEUTROPHILS NFR BLD: 73 % (ref 36–65)
NEUTS SEG NFR BLD: 6.5 K/UL (ref 1.5–8.1)
NRBC BLD-RTO: 0.2 PER 100 WBC
PHOSPHATE SERPL-MCNC: 4.2 MG/DL (ref 2.5–4.5)
PLATELET # BLD AUTO: 130 K/UL (ref 138–453)
PMV BLD AUTO: 10 FL (ref 8.1–13.5)
POTASSIUM SERPL-SCNC: 3.8 MMOL/L (ref 3.7–5.3)
PROT SERPL-MCNC: 5.6 G/DL (ref 6.4–8.3)
RBC # BLD AUTO: 2.41 M/UL (ref 4.21–5.77)
SODIUM SERPL-SCNC: 148 MMOL/L (ref 135–144)
WBC OTHER # BLD: 8.9 K/UL (ref 3.5–11.3)

## 2024-01-24 PROCEDURE — 94761 N-INVAS EAR/PLS OXIMETRY MLT: CPT

## 2024-01-24 PROCEDURE — 2580000003 HC RX 258

## 2024-01-24 PROCEDURE — 94003 VENT MGMT INPAT SUBQ DAY: CPT

## 2024-01-24 PROCEDURE — 6370000000 HC RX 637 (ALT 250 FOR IP)

## 2024-01-24 PROCEDURE — 92611 MOTION FLUOROSCOPY/SWALLOW: CPT

## 2024-01-24 PROCEDURE — 99232 SBSQ HOSP IP/OBS MODERATE 35: CPT | Performed by: INTERNAL MEDICINE

## 2024-01-24 PROCEDURE — 82947 ASSAY GLUCOSE BLOOD QUANT: CPT

## 2024-01-24 PROCEDURE — 2700000000 HC OXYGEN THERAPY PER DAY

## 2024-01-24 PROCEDURE — 71045 X-RAY EXAM CHEST 1 VIEW: CPT

## 2024-01-24 PROCEDURE — 6370000000 HC RX 637 (ALT 250 FOR IP): Performed by: NURSE PRACTITIONER

## 2024-01-24 PROCEDURE — 83735 ASSAY OF MAGNESIUM: CPT

## 2024-01-24 PROCEDURE — 99213 OFFICE O/P EST LOW 20 MIN: CPT

## 2024-01-24 PROCEDURE — 85025 COMPLETE CBC W/AUTO DIFF WBC: CPT

## 2024-01-24 PROCEDURE — 36415 COLL VENOUS BLD VENIPUNCTURE: CPT

## 2024-01-24 PROCEDURE — 2060000000 HC ICU INTERMEDIATE R&B

## 2024-01-24 PROCEDURE — 74230 X-RAY XM SWLNG FUNCJ C+: CPT

## 2024-01-24 PROCEDURE — 80076 HEPATIC FUNCTION PANEL: CPT

## 2024-01-24 PROCEDURE — 6360000002 HC RX W HCPCS

## 2024-01-24 PROCEDURE — 84100 ASSAY OF PHOSPHORUS: CPT

## 2024-01-24 PROCEDURE — 6370000000 HC RX 637 (ALT 250 FOR IP): Performed by: STUDENT IN AN ORGANIZED HEALTH CARE EDUCATION/TRAINING PROGRAM

## 2024-01-24 PROCEDURE — 82330 ASSAY OF CALCIUM: CPT

## 2024-01-24 PROCEDURE — 80048 BASIC METABOLIC PNL TOTAL CA: CPT

## 2024-01-24 PROCEDURE — 99233 SBSQ HOSP IP/OBS HIGH 50: CPT | Performed by: SURGERY

## 2024-01-24 RX ADMIN — CEFTOLOZANE AND TAZOBACTAM 3000 MG: 1; .5 INJECTION, POWDER, LYOPHILIZED, FOR SOLUTION INTRAVENOUS at 14:21

## 2024-01-24 RX ADMIN — HYDROMORPHONE HYDROCHLORIDE 0.5 MG: 1 INJECTION, SOLUTION INTRAMUSCULAR; INTRAVENOUS; SUBCUTANEOUS at 17:53

## 2024-01-24 RX ADMIN — CEFTOLOZANE AND TAZOBACTAM 3000 MG: 1; .5 INJECTION, POWDER, LYOPHILIZED, FOR SOLUTION INTRAVENOUS at 19:56

## 2024-01-24 RX ADMIN — HEPARIN SODIUM 7500 UNITS: 5000 INJECTION INTRAVENOUS; SUBCUTANEOUS at 06:01

## 2024-01-24 RX ADMIN — ACETAMINOPHEN 1000 MG: 650 SOLUTION ORAL at 06:01

## 2024-01-24 RX ADMIN — AMIODARONE HYDROCHLORIDE 200 MG: 200 TABLET ORAL at 08:56

## 2024-01-24 RX ADMIN — CEFTOLOZANE AND TAZOBACTAM 3000 MG: 1; .5 INJECTION, POWDER, LYOPHILIZED, FOR SOLUTION INTRAVENOUS at 04:39

## 2024-01-24 RX ADMIN — LANSOPRAZOLE 30 MG: 30 TABLET, ORALLY DISINTEGRATING, DELAYED RELEASE ORAL at 17:53

## 2024-01-24 RX ADMIN — OXYCODONE HYDROCHLORIDE 10 MG: 5 TABLET ORAL at 12:50

## 2024-01-24 RX ADMIN — INSULIN LISPRO 4 UNITS: 100 INJECTION, SOLUTION INTRAVENOUS; SUBCUTANEOUS at 08:56

## 2024-01-24 RX ADMIN — HYDROMORPHONE HYDROCHLORIDE 0.5 MG: 1 INJECTION, SOLUTION INTRAMUSCULAR; INTRAVENOUS; SUBCUTANEOUS at 08:56

## 2024-01-24 RX ADMIN — SODIUM CHLORIDE, PRESERVATIVE FREE 10 ML: 5 INJECTION INTRAVENOUS at 08:57

## 2024-01-24 RX ADMIN — OXYCODONE HYDROCHLORIDE 10 MG: 5 TABLET ORAL at 19:55

## 2024-01-24 RX ADMIN — CITALOPRAM 30 MG: 20 TABLET, FILM COATED ORAL at 08:56

## 2024-01-24 RX ADMIN — LANSOPRAZOLE 30 MG: 30 TABLET, ORALLY DISINTEGRATING, DELAYED RELEASE ORAL at 06:01

## 2024-01-24 RX ADMIN — ASPIRIN 81 MG 81 MG: 81 TABLET ORAL at 08:57

## 2024-01-24 RX ADMIN — LEVOTHYROXINE SODIUM 200 MCG: 100 TABLET ORAL at 06:01

## 2024-01-24 RX ADMIN — MIDODRINE HYDROCHLORIDE 15 MG: 5 TABLET ORAL at 08:57

## 2024-01-24 RX ADMIN — HYDROMORPHONE HYDROCHLORIDE 0.5 MG: 1 INJECTION, SOLUTION INTRAMUSCULAR; INTRAVENOUS; SUBCUTANEOUS at 04:39

## 2024-01-24 RX ADMIN — MIDODRINE HYDROCHLORIDE 15 MG: 5 TABLET ORAL at 17:53

## 2024-01-24 RX ADMIN — HEPARIN SODIUM 7500 UNITS: 5000 INJECTION INTRAVENOUS; SUBCUTANEOUS at 22:36

## 2024-01-24 RX ADMIN — HEPARIN SODIUM 7500 UNITS: 5000 INJECTION INTRAVENOUS; SUBCUTANEOUS at 14:12

## 2024-01-24 RX ADMIN — OXYCODONE HYDROCHLORIDE 10 MG: 5 TABLET ORAL at 02:49

## 2024-01-24 ASSESSMENT — PAIN SCALES - GENERAL
PAINLEVEL_OUTOF10: 8
PAINLEVEL_OUTOF10: 8
PAINLEVEL_OUTOF10: 9
PAINLEVEL_OUTOF10: 5
PAINLEVEL_OUTOF10: 8
PAINLEVEL_OUTOF10: 5

## 2024-01-24 ASSESSMENT — PULMONARY FUNCTION TESTS
PIF_VALUE: 13
PIF_VALUE: 12
PIF_VALUE: 20
PIF_VALUE: 16
PIF_VALUE: 19
PIF_VALUE: 9
PIF_VALUE: 17

## 2024-01-24 ASSESSMENT — PAIN DESCRIPTION - DESCRIPTORS: DESCRIPTORS: PRESSURE

## 2024-01-24 ASSESSMENT — PAIN SCALES - WONG BAKER: WONGBAKER_NUMERICALRESPONSE: 0

## 2024-01-24 ASSESSMENT — PAIN DESCRIPTION - LOCATION: LOCATION: ABDOMEN

## 2024-01-24 NOTE — PLAN OF CARE
Problem: Discharge Planning  Goal: Discharge to home or other facility with appropriate resources  Outcome: Progressing     Problem: Pain  Goal: Verbalizes/displays adequate comfort level or baseline comfort level  Outcome: Progressing     Problem: Safety - Adult  Goal: Free from fall injury  Outcome: Progressing     Problem: Respiratory - Adult  Goal: Achieves optimal ventilation and oxygenation  1/24/2024 0227 by Dia Soto RN  Outcome: Progressing    Problem: Chronic Conditions and Co-morbidities  Goal: Patient's chronic conditions and co-morbidity symptoms are monitored and maintained or improved  Outcome: Progressing     Problem: Skin/Tissue Integrity  Goal: Absence of new skin breakdown  Description: 1.  Monitor for areas of redness and/or skin breakdown  2.  Assess vascular access sites hourly  3.  Every 4-6 hours minimum:  Change oxygen saturation probe site  4.  Every 4-6 hours:  If on nasal continuous positive airway pressure, respiratory therapy assess nares and determine need for appliance change or resting period.  Outcome: Progressing

## 2024-01-24 NOTE — PROGRESS NOTES
[facility] -   Infectious diseases evaluation  admission date 1/1/2024    reason for consultation:   Necrotizing Faciitis    Impression :   Current:  1/1/24 Necrotizing faciitis 2/2 Polymicrobial infection with T2DM and PEG tube dislodgement and infected   LEONARDA on CKD   1/2/24 S/p partial wedge gastrectomy due to infected PEG tube and dislodgement   1/2/24 S/p debridement of abdomen and indwelling mesh removal,  due to concern for necrotizing fasciitis, wound vac in place  Cx abd wall 1/5 kleb x 2 and enterococcus and saccharomyces   1/3/24 GASTROSTOMY TUBE PLACEMENT, REPAIR OF LARGE VENTRAL DEFECT   Alcoholic Liver cirrhosis - found intra op  Elevated CRP  Lactic acidosis- initial 4.1 now 6.2  Bandemia leukemoid reaction- WBC 15 to 38 to 27 -45  Proteus UTI - treated  RLL pseudomonas pneumonia 1/1 - treated  Iv phlebitis -iv removed   1/11 R lung collapse - sp again pseudomonas R meropenem    Other:    Discussion / summary of stay / plan of care   Respiratory culture: pseudomonas multiS 1/1/24 -treated  U cx proteus multiS - treated  Bcx pending - SCN x 1  is a contamination  MRSA swab and COVID pending   cx of the abd wall  of 1/5/24 enterococcus and kleb and saccharomyces  Leukemoid reaction better 16 --27 fluctuating  Zyvox stopped 1/6 due to thrombocytopenia  Avoid fluconazole due to amiodarone  1/7 CT abd suggesting bilat LL atelectasis rather than  pneumonia   micafungin  1/9/24 - stop 1/13  Given due to persistent bandemia,  cover the fungus  1/11 R arm iv phlebitis - added doxy- stop 1/14  RLL pseudomonas pneumonia 1/1 - treated  Iv phlebitis -iv removed   1/11 R lung collapse - sp again pseudomonas but meropenem R -    Recommendations   Treating   large abd wound infection w kleb enterococcus and saccharomyces  Bandemia persistent but ultimately improved  Iv phlebitis pulled and resolved  R lung collapse 1/11 w mucous plug, sp pseudomonas  1/15       1/13 WBC

## 2024-01-24 NOTE — PLAN OF CARE
Problem: Discharge Planning  Goal: Discharge to home or other facility with appropriate resources  Outcome: Progressing  Flowsheets (Taken 1/24/2024 0800)  Discharge to home or other facility with appropriate resources: Identify barriers to discharge with patient and caregiver     Problem: Pain  Goal: Verbalizes/displays adequate comfort level or baseline comfort level  Outcome: Progressing  Flowsheets (Taken 1/24/2024 1600)  Verbalizes/displays adequate comfort level or baseline comfort level:   Administer analgesics based on type and severity of pain and evaluate response   Assess pain using appropriate pain scale     Problem: Safety - Adult  Goal: Free from fall injury  Outcome: Progressing     Problem: Respiratory - Adult  Goal: Achieves optimal ventilation and oxygenation  1/24/2024 1835 by Anne-Marie Ley, RN  Outcome: Progressing  1/24/2024 0841 by Sabine Bruner RCP  Outcome: Progressing  Flowsheets (Taken 1/24/2024 0800 by Anne-Marie Ley, RN)  Achieves optimal ventilation and oxygenation: Assess for changes in respiratory status     Problem: Chronic Conditions and Co-morbidities  Goal: Patient's chronic conditions and co-morbidity symptoms are monitored and maintained or improved  Outcome: Progressing  Flowsheets (Taken 1/24/2024 0800)  Care Plan - Patient's Chronic Conditions and Co-Morbidity Symptoms are Monitored and Maintained or Improved: Monitor and assess patient's chronic conditions and comorbid symptoms for stability, deterioration, or improvement     Problem: Skin/Tissue Integrity  Goal: Absence of new skin breakdown  Description: 1.  Monitor for areas of redness and/or skin breakdown  2.  Assess vascular access sites hourly  3.  Every 4-6 hours minimum:  Change oxygen saturation probe site  4.  Every 4-6 hours:  If on nasal continuous positive airway pressure, respiratory therapy assess nares and determine need for appliance change or resting period.  Outcome: Progressing     Problem:  ABCDS Injury Assessment  Goal: Absence of physical injury  Outcome: Progressing     Problem: Nutrition Deficit:  Goal: Optimize nutritional status  Outcome: Progressing

## 2024-01-24 NOTE — PROGRESS NOTES
Physical Therapy        Physical Therapy Cancel Note      DATE: 2024    NAME: Ruben Dimas  MRN: [id number]   : 1965      Patient not seen this date for Physical Therapy due to:    Patient Declined: Pt initially agreeable to PT but after PTA and OT explained the plan was to sit EOB, pt declined stating \"I'm tired\". Educated pt on the importance of mobility, pt continued to decline at this time.      Electronically signed by Meena Macias PTA on 2024 at 11:50 AM

## 2024-01-24 NOTE — PROGRESS NOTES
PROGRESS NOTE          PATIENT NAME: Ruben Dimas  MEDICAL RECORD NO. [id number]  DATE: 1/24/2024  SURGEON: Jake  PRIMARY CARE PHYSICIAN: Ramy Lazo MD    HD: # 23    ASSESSMENT    Patient Active Problem List   Diagnosis    Alcoholic cirrhosis of liver (HCC), sober since 2013    Peripheral edema    Bipolar disorder (HCC)    Type 2 diabetes mellitus with diabetic neuropathy, with long-term current use of insulin (HCC)    Essential hypertension, currently hypotensive    Dyslipidemia    Weakness    Acute metabolic encephalopathy    FABI (obstructive sleep apnea)    Primary osteoarthritis of right knee    Type 2 diabetes mellitus with diabetic neuropathy (HCC)    Acquired hypothyroidism    Urine retention    Anemia    Slow transit constipation    Iron deficiency anemia due to chronic blood loss    Gastroesophageal reflux disease without esophagitis    LEONARDA (acute kidney injury) (HCC)    Secondary esophageal varices without bleeding (HCC)    Portal hypertension (HCC)    Obesity, morbid, BMI 50 or higher (HCC)    Venous stasis dermatitis of both lower extremities    Cellulitis of perineum    Chronic indwelling Clemons catheter    Anxiety and depression    Back pain, chronic    BPH (benign prostatic hyperplasia)    Chronic diastolic CHF (congestive heart failure) (HCC)    Cirrhosis (HCC)    Diabetes mellitus (HCC)    GERD (gastroesophageal reflux disease)    Hepatitis    Hiatal hernia    Hypertension, essential    IBS (irritable bowel syndrome)    Morbid obesity with BMI of 45.0-49.9, adult (HCC)    Neuropathy    Chronic respiratory failure with hypoxia, on home oxygen therapy (HCC)    Sleep apnea    Thyroid disease    Urinary bladder neurogenic dysfunction    Acute UTI    Musculoskeletal immobility    Pyocystis    Myoclonic jerking    Thrombocytopenia (HCC)    Hypotension    Sepsis with acute hypercapnic respiratory failure and septic shock (HCC)    Right lower lobe pneumonia    Acute kidney injury superimposed on

## 2024-01-24 NOTE — PROGRESS NOTES
on right side of abdomen that is draining, mckenzie snot appear infected. WBC improved. Otherwise no acute changes.     Summary of relevant labs:  Labs:  WBC 38 - 27 - 45 - 43 -20 -15 -17 - 12 - 22 - 18 - 27 - 11 - 11 - 8 - 14 -   Plts 171 - 126 - 90 - 73 - 158 - 119  Lactic 6   -221 - 208 - 188  Creat 1.6 -    Micro:  BC SCN x 1  - contamination    Sp cx 1/1 pseudomonas multi S  Sp cx 1/15 pseudomonas S levaquin and R meropenem    FLU A/B negative  Nasal MRSA +  Covid neg    U cx  1/1 proteus S all    Abd wound cx 1/5/24  Kleb x 2 strains - saccharomyces and ENTEROCOCCUS fecalis    Imaging:  CT AP 1/16   No abd abscess - R mod effusion w atelectasis    CT chest abd AP 1/15 persistent R lung collapse from mucous plug, no abd abscesses     CXR 1/14  Worsening  pneumonia       CXR 1/13  Same as before      CXR 1/11  Same RLL findings      CTAP 1/13  1. Moderate to large right pleural effusion with collapse of the right lower   lobe and partial collapse of the right middle lobe and right upper lobe.   Filling defects in the bronchus intermedius may relate to mucous plugging or   aspiration.  Small left pleural effusion with dependent atelectasis of left   lower lobe.   2. Cirrhotic liver with splenomegaly.   3. Nonobstructing bilateral nephrolithiasis.   4. Mild circumferential thickening of the distal descending colon may relate   to infectious or inflammatory etiology.   5. Drainage catheter in the right lower quadrant.  Trace fluid at the tip of   the catheter with no formed fluid collection or abscess.   6. Hyperdense material in the dependent portion of the gallbladder lumen.   Differential includes excreted contrast, sludge, and small stones.   7. Incidental 1.9 cm left breast mass in the lower outer quadrant.  Similar   finding was present on the CT of October 21, 2021.  Although stability favors   a benign etiology, CT does not adequately assess breast masses.  Recommend   ultrasound of the breast on a  Partner Violence: Not on file   Housing Stability: Not on file       Family History:     Family History   Adopted: Yes   Problem Relation Age of Onset    No Known Problems Mother         Adopted    No Known Problems Father         Adopted      Medical Decision Making:   I have independently reviewed/ordered the following labs:    CBC with Differential:   Recent Labs     01/23/24  0548 01/24/24  0433   WBC 14.4* 8.9   HGB 7.7* 7.1*   HCT 24.9* 23.5*    130*   LYMPHOPCT 6* 12*   MONOPCT 6 8       BMP:  Recent Labs     01/23/24  0548 01/24/24  0433   * 148*   K 4.2 3.8   * 120*   CO2 17* 18*   BUN 48* 51*   CREATININE 1.3* 1.2   MG 2.7* 2.5       Hepatic Function Panel:   Recent Labs     01/23/24  0548 01/24/24  0433   PROT 5.8* 5.6*   LABALBU 2.1* 2.1*   BILIDIR 0.4* 0.4*   IBILI 0.3 0.3   BILITOT 0.7 0.7   ALKPHOS 810* 811*   ALT 18 17   AST 26 27       No results for input(s): \"RPR\" in the last 72 hours.  No results for input(s): \"HIV\" in the last 72 hours.  No results for input(s): \"BC\" in the last 72 hours.  Lab Results   Component Value Date/Time    CREATININE 1.2 01/24/2024 04:33 AM    GLUCOSE 184 01/24/2024 04:33 AM    GLUCOSE 102 08/30/2011 02:20 PM       Detailed results:        Thank you for allowing us to participate in the care of this patient.Please call with questions.    This note is created with the assistance of a speech recognition program.  While intending to generate adocument that actually reflects the content of the visit, the document can still have some errors including those of syntax and sound a like substitutions which may escape proof reading.  It such instances, actual meaningcan be extrapolated by contextual diversion.             Office: [phone]  Perfect serve / office [phone]    Don Murry MD  Family Medicine Resident, PGY-2   01/24/24        I have discussed the care of the patient, including pertinent history and exam findings,  with the

## 2024-01-24 NOTE — PLAN OF CARE
Problem: Respiratory - Adult  Goal: Achieves optimal ventilation and oxygenation  1/23/2024 1936 by Seema Ramirez RCP  Outcome: Progressing

## 2024-01-24 NOTE — PROGRESS NOTES
[facility] Nurse  Consult Note       NAME:  Ruben Dimas  MEDICAL RECORD NUMBER:  [id number]  AGE: 58 y.o.   GENDER: male  : 1965  TODAY'S DATE:  2024    Subjective:     Reason for WOCN Evaluation and Assessment: \"back/buttock wounds\"      Ruben Dimas is a 58 y.o. male referred by:   [x] Physician  [] Nursing  [] Other:     Wound Identification:  Irritant contact dermatitis due to dual incontinence present on admission.            Objective:      /62   Pulse 66   Temp 97.5 °F (36.4 °C) (Axillary)   Resp 20   Ht 1.778 m (5' 10\")   Wt (!) 157.3 kg (346 lb 11.2 oz)   SpO2 100%   BMI 49.75 kg/m²   Fantasma Risk Score: Fantasma Scale Score: 12    LABS    CBC:   Lab Results   Component Value Date/Time    WBC 8.9 2024 04:33 AM    RBC 2.41 2024 04:33 AM    RBC 3.93 2012 10:04 AM    HGB 7.1 2024 04:33 AM    HCT 23.5 2024 04:33 AM     CMP:  Albumin:    Lab Results   Component Value Date/Time    LABALBU 2.1 2024 04:33 AM    LABALBU 4.0 2012 09:23 AM     PT/INR:    Lab Results   Component Value Date/Time    PROTIME 13.3 2024 08:29 AM    PROTIME 11.6 2012 09:23 AM    INR 1.0 2024 08:29 AM     HgBA1c:    Lab Results   Component Value Date/Time    LABA1C 6.3 2024 01:22 AM     PTT: No components found for: \"LABPTT\"      Assessment:     Marked improvement in buttock and posterior thigh skin since admission. Few areas of non-intact skin remain to the bilateral posterior thighs and gluteal crease.   Right lateral abdomen dressing saturated and packing falling out with turn.  Measurements:       24 1510   Wound 24 Abdomen Lateral;Lower;Right   Date First Assessed/Time First Assessed: 24 0800   Primary Wound Type: Soft Tissue Necrosis  Location: Abdomen  Wound Location Orientation: Lateral;Lower;Right  Wound Outcome: (c) Other (Comment)   Wound Etiology Surgical   Dressing Status New dressing applied   Wound

## 2024-01-24 NOTE — PROGRESS NOTES
Occupational Therapy    [facility]  Occupational Therapy Not Seen Note    DATE: 2024    NAME: Ruben Dimas  MRN: [id number]   : 1965      Patient not seen this date for Occupational Therapy due to:    Other: Writer attempting to engage pt in occupational therapy treatment this date. Pt originally agreeable and writer preparing for edge of bed mobility. Pt reports increase fatigue and discomfort and declining therapeutic services. Writer educating on importance of OT/OOB and engagement during meaningful activity. Pt cont to decline engagement this date despite education and efforts.     Next Scheduled Treatment: Please see next date 24    Electronically signed by ROMA Navarro  on 2024 at 4:07 PM

## 2024-01-24 NOTE — PROCEDURES
INSTRUMENTAL SWALLOW REPORT  MODIFIED BARIUM SWALLOW    NAME: Ruben Dimas   : 1965  MRN: [id number]       Date of Eval: 2024              Referring Diagnosis(es):      Past Medical History:  has a past medical history of A-fib (HCC), Anxiety and depression, Back pain, chronic, BPH (benign prostatic hyperplasia), Cellulitis of left lower extremity, Cellulitis of right lower extremity, CHF (congestive heart failure) (HCC), Chronic respiratory failure with hypoxia (HCC), Cirrhosis (HCC), Diabetes mellitus (HCC), Epididymoorchitis, GERD (gastroesophageal reflux disease), H/O cardiac catheterization, Hepatitis, Hiatal hernia, History of alcohol abuse, Hyperlipidemia, Hypertension, IBS (irritable bowel syndrome), Incisional hernia with obstruction but no gangrene, Klebsiella sepsis (HCC), Metabolic encephalopathy, Morbid obesity (HCC), Neuropathy, On home oxygen therapy, On home oxygen therapy, Pancreatitis, Poisoning by insulin and oral hypoglycemic (antidiabetic) drugs, accidental (unintentional), initial encounter, Primary osteoarthritis of right knee, Retention, urine, SBO (small bowel obstruction) (HCC), Secondary hypercoagulable state (HCC), Septic shock (HCC), Sleep apnea, Sleep apnea, obstructive, Thyroid disease, Under care of team, Urinary bladder neurogenic dysfunction, and Urinary tract infection associated with indwelling urethral catheter (HCC).  Past Surgical History:  has a past surgical history that includes Colonoscopy; Abdomen surgery; hernia repair; Endoscopy, colon, diagnostic; Upper gastrointestinal endoscopy (2017); pr egd transoral biopsy single/multiple (N/A, 2017); pr i&d below fascia foot 1 bursal space (Bilateral, 2017); Cystoscopy (2018); pr cystourethroscopy (N/A, 2018); Incisional hernia repair (2018); ventral hernia repair (N/A, 2018); Cystocopy (2019); Cystoscopy (N/A, 2019); Upper gastrointestinal endoscopy (N/A,

## 2024-01-24 NOTE — PLAN OF CARE
Problem: Respiratory - Adult  Goal: Achieves optimal ventilation and oxygenation  1/24/2024 0841 by Sabine Bruner RCP  Outcome: Progressing  1/24/2024 0227 by Dia Soto RN  Outcome: Progressing  1/23/2024 1936 by Seema Ramirez RCP  Outcome: Progressing

## 2024-01-25 ENCOUNTER — APPOINTMENT (OUTPATIENT)
Dept: GENERAL RADIOLOGY | Age: 59
DRG: 853 | End: 2024-01-25
Payer: MEDICARE

## 2024-01-25 LAB
ANION GAP SERPL CALCULATED.3IONS-SCNC: 11 MMOL/L (ref 9–17)
BASOPHILS # BLD: 0 K/UL (ref 0–0.2)
BASOPHILS NFR BLD: 0 % (ref 0–2)
BUN SERPL-MCNC: 49 MG/DL (ref 6–20)
CA-I BLD-SCNC: 1.26 MMOL/L (ref 1.13–1.33)
CALCIUM SERPL-MCNC: 8.6 MG/DL (ref 8.6–10.4)
CHLORIDE SERPL-SCNC: 113 MMOL/L (ref 98–107)
CO2 SERPL-SCNC: 18 MMOL/L (ref 20–31)
CREAT SERPL-MCNC: 1 MG/DL (ref 0.7–1.2)
EOSINOPHIL # BLD: 0.1 K/UL (ref 0–0.4)
EOSINOPHILS RELATIVE PERCENT: 1 % (ref 1–4)
ERYTHROCYTE [DISTWIDTH] IN BLOOD BY AUTOMATED COUNT: 20.2 % (ref 11.8–14.4)
GFR SERPL CREATININE-BSD FRML MDRD: >60 ML/MIN/1.73M2
GLUCOSE BLD-MCNC: 135 MG/DL (ref 75–110)
GLUCOSE BLD-MCNC: 138 MG/DL (ref 75–110)
GLUCOSE BLD-MCNC: 148 MG/DL (ref 75–110)
GLUCOSE BLD-MCNC: 150 MG/DL (ref 75–110)
GLUCOSE BLD-MCNC: 163 MG/DL (ref 75–110)
GLUCOSE BLD-MCNC: 178 MG/DL (ref 75–110)
GLUCOSE BLD-MCNC: 211 MG/DL (ref 75–110)
GLUCOSE SERPL-MCNC: 198 MG/DL (ref 70–99)
HCT VFR BLD AUTO: 24.3 % (ref 40.7–50.3)
HGB BLD-MCNC: 7.4 G/DL (ref 13–17)
IMM GRANULOCYTES # BLD AUTO: 0.29 K/UL (ref 0–0.3)
IMM GRANULOCYTES NFR BLD: 3 %
LYMPHOCYTES NFR BLD: 0.76 K/UL (ref 1–4.8)
LYMPHOCYTES RELATIVE PERCENT: 8 % (ref 24–44)
MAGNESIUM SERPL-MCNC: 2.3 MG/DL (ref 1.6–2.6)
MCH RBC QN AUTO: 29.4 PG (ref 25.2–33.5)
MCHC RBC AUTO-ENTMCNC: 30.5 G/DL (ref 28.4–34.8)
MCV RBC AUTO: 96.4 FL (ref 82.6–102.9)
MONOCYTES NFR BLD: 0.86 K/UL (ref 0.1–0.8)
MONOCYTES NFR BLD: 9 % (ref 1–7)
MORPHOLOGY: ABNORMAL
NEUTROPHILS NFR BLD: 79 % (ref 36–66)
NEUTS SEG NFR BLD: 7.49 K/UL (ref 1.8–7.7)
NRBC BLD-RTO: 0 PER 100 WBC
NUCLEATED RED BLOOD CELLS: 1 PER 100 WBC
PHOSPHATE SERPL-MCNC: 3.5 MG/DL (ref 2.5–4.5)
PLATELET # BLD AUTO: 108 K/UL (ref 138–453)
PMV BLD AUTO: 9.2 FL (ref 8.1–13.5)
POTASSIUM SERPL-SCNC: 4.1 MMOL/L (ref 3.7–5.3)
RBC # BLD AUTO: 2.52 M/UL (ref 4.21–5.77)
SODIUM SERPL-SCNC: 142 MMOL/L (ref 135–144)
WBC OTHER # BLD: 9.5 K/UL (ref 3.5–11.3)

## 2024-01-25 PROCEDURE — 6370000000 HC RX 637 (ALT 250 FOR IP): Performed by: STUDENT IN AN ORGANIZED HEALTH CARE EDUCATION/TRAINING PROGRAM

## 2024-01-25 PROCEDURE — 2060000000 HC ICU INTERMEDIATE R&B

## 2024-01-25 PROCEDURE — 83735 ASSAY OF MAGNESIUM: CPT

## 2024-01-25 PROCEDURE — 6360000002 HC RX W HCPCS

## 2024-01-25 PROCEDURE — 82947 ASSAY GLUCOSE BLOOD QUANT: CPT

## 2024-01-25 PROCEDURE — 94761 N-INVAS EAR/PLS OXIMETRY MLT: CPT

## 2024-01-25 PROCEDURE — 99232 SBSQ HOSP IP/OBS MODERATE 35: CPT | Performed by: SURGERY

## 2024-01-25 PROCEDURE — 6370000000 HC RX 637 (ALT 250 FOR IP)

## 2024-01-25 PROCEDURE — 84100 ASSAY OF PHOSPHORUS: CPT

## 2024-01-25 PROCEDURE — 82330 ASSAY OF CALCIUM: CPT

## 2024-01-25 PROCEDURE — 94003 VENT MGMT INPAT SUBQ DAY: CPT

## 2024-01-25 PROCEDURE — 99232 SBSQ HOSP IP/OBS MODERATE 35: CPT | Performed by: INTERNAL MEDICINE

## 2024-01-25 PROCEDURE — 2580000003 HC RX 258

## 2024-01-25 PROCEDURE — 85025 COMPLETE CBC W/AUTO DIFF WBC: CPT

## 2024-01-25 PROCEDURE — 2700000000 HC OXYGEN THERAPY PER DAY

## 2024-01-25 PROCEDURE — 80048 BASIC METABOLIC PNL TOTAL CA: CPT

## 2024-01-25 PROCEDURE — 36415 COLL VENOUS BLD VENIPUNCTURE: CPT

## 2024-01-25 RX ADMIN — LANSOPRAZOLE 30 MG: 30 TABLET, ORALLY DISINTEGRATING, DELAYED RELEASE ORAL at 17:02

## 2024-01-25 RX ADMIN — LEVOTHYROXINE SODIUM 200 MCG: 100 TABLET ORAL at 05:09

## 2024-01-25 RX ADMIN — CEFTOLOZANE AND TAZOBACTAM 3000 MG: 1; .5 INJECTION, POWDER, LYOPHILIZED, FOR SOLUTION INTRAVENOUS at 20:28

## 2024-01-25 RX ADMIN — CEFTOLOZANE AND TAZOBACTAM 3000 MG: 1; .5 INJECTION, POWDER, LYOPHILIZED, FOR SOLUTION INTRAVENOUS at 05:10

## 2024-01-25 RX ADMIN — AMIODARONE HYDROCHLORIDE 200 MG: 200 TABLET ORAL at 12:50

## 2024-01-25 RX ADMIN — MIDODRINE HYDROCHLORIDE 15 MG: 5 TABLET ORAL at 17:02

## 2024-01-25 RX ADMIN — ASPIRIN 81 MG 81 MG: 81 TABLET ORAL at 09:14

## 2024-01-25 RX ADMIN — LANSOPRAZOLE 30 MG: 30 TABLET, ORALLY DISINTEGRATING, DELAYED RELEASE ORAL at 05:09

## 2024-01-25 RX ADMIN — HEPARIN SODIUM 7500 UNITS: 5000 INJECTION INTRAVENOUS; SUBCUTANEOUS at 05:09

## 2024-01-25 RX ADMIN — CITALOPRAM 30 MG: 20 TABLET, FILM COATED ORAL at 09:13

## 2024-01-25 RX ADMIN — OXYCODONE HYDROCHLORIDE 10 MG: 5 TABLET ORAL at 05:09

## 2024-01-25 RX ADMIN — MIDODRINE HYDROCHLORIDE 15 MG: 5 TABLET ORAL at 12:50

## 2024-01-25 RX ADMIN — HYDROMORPHONE HYDROCHLORIDE 0.5 MG: 1 INJECTION, SOLUTION INTRAMUSCULAR; INTRAVENOUS; SUBCUTANEOUS at 01:29

## 2024-01-25 RX ADMIN — INSULIN LISPRO 8 UNITS: 100 INJECTION, SOLUTION INTRAVENOUS; SUBCUTANEOUS at 05:28

## 2024-01-25 RX ADMIN — CEFTOLOZANE AND TAZOBACTAM 3000 MG: 1; .5 INJECTION, POWDER, LYOPHILIZED, FOR SOLUTION INTRAVENOUS at 12:53

## 2024-01-25 RX ADMIN — ONDANSETRON 4 MG: 2 INJECTION INTRAMUSCULAR; INTRAVENOUS at 09:27

## 2024-01-25 RX ADMIN — MIDODRINE HYDROCHLORIDE 15 MG: 5 TABLET ORAL at 09:14

## 2024-01-25 RX ADMIN — HEPARIN SODIUM 7500 UNITS: 5000 INJECTION INTRAVENOUS; SUBCUTANEOUS at 12:50

## 2024-01-25 RX ADMIN — HEPARIN SODIUM 7500 UNITS: 5000 INJECTION INTRAVENOUS; SUBCUTANEOUS at 22:20

## 2024-01-25 ASSESSMENT — PULMONARY FUNCTION TESTS
PIF_VALUE: 21
PIF_VALUE: 17
PIF_VALUE: 17
PIF_VALUE: 24
PIF_VALUE: 17

## 2024-01-25 ASSESSMENT — PAIN SCALES - GENERAL
PAINLEVEL_OUTOF10: 8
PAINLEVEL_OUTOF10: 8

## 2024-01-25 NOTE — PLAN OF CARE
Problem: Discharge Planning  Goal: Discharge to home or other facility with appropriate resources  Outcome: Progressing  Flowsheets (Taken 1/25/2024 1838)  Discharge to home or other facility with appropriate resources:   Identify barriers to discharge with patient and caregiver   Identify discharge learning needs (meds, wound care, etc)     Problem: Pain  Goal: Verbalizes/displays adequate comfort level or baseline comfort level  Outcome: Progressing  Flowsheets (Taken 1/25/2024 1838)  Verbalizes/displays adequate comfort level or baseline comfort level:   Assess pain using appropriate pain scale   Encourage patient to monitor pain and request assistance   Administer analgesics based on type and severity of pain and evaluate response   Implement non-pharmacological measures as appropriate and evaluate response   Consider cultural and social influences on pain and pain management   Notify Licensed Independent Practitioner if interventions unsuccessful or patient reports new pain     Problem: Safety - Adult  Goal: Free from fall injury  Outcome: Progressing  Flowsheets (Taken 1/25/2024 1838)  Free From Fall Injury: Instruct family/caregiver on patient safety

## 2024-01-25 NOTE — PROGRESS NOTES
Comprehensive Nutrition Assessment    Type and Reason for Visit:  Reassess    Nutrition Recommendations/Plan:   Continue current diet, Minced and Moist  Modify ONS to Magic Cup TID  If PO intake remains suboptimal, suggest re-start TF as cyclic, Immune-enhancing at 50 mL/hr x 12 hrs (6 pm to 6 am).   Monitor/encourage PO intake     Malnutrition Assessment:  Malnutrition Status:  Insufficient data (01/03/24 1416)    Context:  Acute Illness     Findings of the 6 clinical characteristics of malnutrition:  Energy Intake:  Mild decrease in energy intake (Comment)  Weight Loss:  7.5% over 3 months     Body Fat Loss:  Unable to assess     Muscle Mass Loss:  Unable to assess    Fluid Accumulation:  Mild (to moderate) Extremities, Generalized   Strength:  Not Performed    Nutrition Assessment:    Pt s/p MBSS (1/24), SLP recommends Minced and Moist with Thin liquids. TF discontinued 1/23, but was still running during visit. Pt complained of n/v this morning, 0% PO intake observed at breakfast. d/w RN, who was unaware of discontinued TF, discussed stopping TF and providing appropriate meds for n/v. Per RN, pt tolerated minced & moist diet yesterday, unsure of % intake. Encouraged RN to monitor PO intake over next few days, and consult RDN if PO intake remains suboptimal with d/c of TF. Pt states he dislikes Ensure, will d/c. LBM 1/24. +2 generalized/BUE, +2 pitting BLE edema noted.    Nutrition Related Findings:    labs/meds reviewed Wound Type: Multiple, Skin Tears, Moisture Associate Skin Damage, Surgical Incision, Wound Vac       Current Nutrition Intake & Therapies:    Average Meal Intake: Unable to assess  Average Supplements Intake: Refusing to take  ADULT DIET; Dysphagia - Minced and Moist  ADULT ORAL NUTRITION SUPPLEMENT; Lunch, Dinner; Frozen Oral Supplement  ADULT ORAL NUTRITION SUPPLEMENT; Breakfast, Dinner; Renal Oral Supplement    Anthropometric Measures:  Height: 177.8 cm (5' 10\")  Ideal Body Weight (IBW):  Patient contacted regarding COVID-19 risk and screening. Care Transition Nurse/ Ambulatory Care Manager contacted the parent by telephone to perform follow-up assessment. Verified name and  with parent as identifiers. Patient has following risk factors of: febrile illness. Symptoms reviewed with parent who verbalized the following symptoms: no new symptoms and no worsening symptoms. Due to no new or worsening symptoms encounter was not routed to provider for escalation. Education provided regarding infection prevention, and signs and symptoms of COVID-19 and when to seek medical attention with parent who verbalized understanding. Discussed exposure protocols and quarantine from [address] you at higher risk for severe illness  and given an opportunity for questions and concerns. The parent agrees to contact the COVID-19 hotline [phone] or PCP office for questions related to their healthcare. CTN/ACM provided contact information for future reference. From CDC: Are you at higher risk for severe illness?  Wash your hands often.  Avoid close contact (6 feet, which is about two arm lengths) with people who are sick.  Put distance between yourself and other people if COVID-19 is spreading in your community.  Clean and disinfect frequently touched surfaces.  Avoid all cruise travel and non-essential air travel.  Call your healthcare professional if you have concerns about COVID-19 and your underlying condition or if you are sick. For more information on steps you can take to protect yourself, see CDC's How to [address] [facility] for follow-up call in 5-7 days based on severity of symptoms and risk factors.

## 2024-01-25 NOTE — PROGRESS NOTES
Physical Therapy        Physical Therapy Cancel Note      DATE: 2024    NAME: Ruben Dimas  MRN: [id number]   : 1965      Patient not seen this date for Physical Therapy due to:    Patient Declined: Pt with difficulty keeping eyes open while therapist was present in room. Pt declining to participate in PT this afternoon d/t fatigue and nausea. Will continue to check on pt as appropriate.       Electronically signed by Kori Macedo PTA on 2024 at 1:56 PM

## 2024-01-25 NOTE — CARE COORDINATION
Transitional planning    Writer to room to discuss d/c plan, plan for OR tomorrow for vac exchange, trach/vent/tube feeds, from [facility] (bedhold) [facility] has accepted if LT[facility] appropriate, patient agreeable to plan.

## 2024-01-25 NOTE — PROGRESS NOTES
PROGRESS NOTE          PATIENT NAME: Ruben Dimas  MEDICAL RECORD NO. [id number]  DATE: 1/25/2024  SURGEON: Jake  PRIMARY CARE PHYSICIAN: Ramy Lazo MD    HD: # 24    ASSESSMENT    Patient Active Problem List   Diagnosis    Alcoholic cirrhosis of liver (HCC), sober since 2013    Peripheral edema    Bipolar disorder (HCC)    Type 2 diabetes mellitus with diabetic neuropathy, with long-term current use of insulin (HCC)    Essential hypertension, currently hypotensive    Dyslipidemia    Weakness    Acute metabolic encephalopathy    FABI (obstructive sleep apnea)    Primary osteoarthritis of right knee    Type 2 diabetes mellitus with diabetic neuropathy (HCC)    Acquired hypothyroidism    Urine retention    Anemia    Slow transit constipation    Iron deficiency anemia due to chronic blood loss    Gastroesophageal reflux disease without esophagitis    LEONARDA (acute kidney injury) (HCC)    Secondary esophageal varices without bleeding (HCC)    Portal hypertension (HCC)    Obesity, morbid, BMI 50 or higher (HCC)    Venous stasis dermatitis of both lower extremities    Cellulitis of perineum    Chronic indwelling Clemons catheter    Anxiety and depression    Back pain, chronic    BPH (benign prostatic hyperplasia)    Chronic diastolic CHF (congestive heart failure) (HCC)    Cirrhosis (HCC)    Diabetes mellitus (HCC)    GERD (gastroesophageal reflux disease)    Hepatitis    Hiatal hernia    Hypertension, essential    IBS (irritable bowel syndrome)    Morbid obesity with BMI of 45.0-49.9, adult (HCC)    Neuropathy    Chronic respiratory failure with hypoxia, on home oxygen therapy (HCC)    Sleep apnea    Thyroid disease    Urinary bladder neurogenic dysfunction    Acute UTI    Musculoskeletal immobility    Pyocystis    Myoclonic jerking    Thrombocytopenia (HCC)    Hypotension    Sepsis with acute hypercapnic respiratory failure and septic shock (HCC)    Right lower lobe pneumonia    Acute kidney injury superimposed on  disease identified in the interval.  No evidence for pneumothorax.     No significant interval change.        Nano Remy MD  1/25/2024  11:15 AM            Trauma Attending Attestation      I have reviewed the above GCS note(s) and confirmed the key elements of the medical history and physical exam. I have seen and examined the pt.  I have discussed the findings, established the care plan and recommendations with Resident.      John Ruelas DO  1/26/2024  10:24 AM

## 2024-01-25 NOTE — PLAN OF CARE
Problem: Discharge Planning  Goal: Discharge to home or other facility with appropriate resources  1/25/2024 0006 by Dia Soto RN  Outcome: Progressing  Discharge to home or other facility with appropriate resources: Identify barriers to discharge with patient and caregiver     Problem: Pain  Goal: Verbalizes/displays adequate comfort level or baseline comfort level  1/25/2024 0006 by Dia Soto RN  Outcome: Progressing  Flowsheets (Taken 1/24/2024 1600)  Verbalizes/displays adequate comfort level or baseline comfort level:   Administer analgesics based on type and severity of pain and evaluate response   Assess pain using appropriate pain scale     Problem: Safety - Adult  Goal: Free from fall injury  1/25/2024 0006 by Dia Soto RN  Outcome: Progressing    Problem: Chronic Conditions and Co-morbidities  Goal: Patient's chronic conditions and co-morbidity symptoms are monitored and maintained or improved    Outcome: Progressing  Flowsheets (Taken 1/24/2024 0800)  Care Plan - Patient's Chronic Conditions and Co-Morbidity Symptoms are Monitored and Maintained or Improved: Monitor and assess patient's chronic conditions and comorbid symptoms for stability, deterioration, or improvement

## 2024-01-25 NOTE — PLAN OF CARE
Problem: Respiratory - Adult  Goal: Achieves optimal ventilation and oxygenation  1/24/2024 1934 by Seema Ramirez RCP  Outcome: Progressing

## 2024-01-25 NOTE — PLAN OF CARE
Problem: Respiratory - Adult  Goal: Achieves optimal ventilation and oxygenation  1/25/2024 0817 by Martha Crane RCP  Outcome: Progressing  1/24/2024 1934 by Seema Ramirez RCP  Outcome: Progressing  1/24/2024 1835 by Anne-Marie Ley RN  Outcome: Progressing

## 2024-01-26 ENCOUNTER — ANESTHESIA (OUTPATIENT)
Dept: OPERATING ROOM | Age: 59
End: 2024-01-26
Payer: MEDICARE

## 2024-01-26 ENCOUNTER — ANESTHESIA EVENT (OUTPATIENT)
Dept: OPERATING ROOM | Age: 59
End: 2024-01-26
Payer: MEDICARE

## 2024-01-26 LAB
ANION GAP SERPL CALCULATED.3IONS-SCNC: 11 MMOL/L (ref 9–17)
ANION GAP SERPL CALCULATED.3IONS-SCNC: 9 MMOL/L (ref 9–17)
BASOPHILS # BLD: 0 K/UL (ref 0–0.2)
BASOPHILS # BLD: 0 K/UL (ref 0–0.2)
BASOPHILS NFR BLD: 0 % (ref 0–2)
BASOPHILS NFR BLD: 0 % (ref 0–2)
BUN SERPL-MCNC: 39 MG/DL (ref 6–20)
BUN SERPL-MCNC: 41 MG/DL (ref 6–20)
CA-I BLD-SCNC: 1.16 MMOL/L (ref 1.13–1.33)
CA-I BLD-SCNC: 1.37 MMOL/L (ref 1.13–1.33)
CALCIUM SERPL-MCNC: 8.4 MG/DL (ref 8.6–10.4)
CALCIUM SERPL-MCNC: 8.6 MG/DL (ref 8.6–10.4)
CHLORIDE SERPL-SCNC: 113 MMOL/L (ref 98–107)
CHLORIDE SERPL-SCNC: 114 MMOL/L (ref 98–107)
CO2 SERPL-SCNC: 13 MMOL/L (ref 20–31)
CO2 SERPL-SCNC: 15 MMOL/L (ref 20–31)
CREAT SERPL-MCNC: 1 MG/DL (ref 0.7–1.2)
CREAT SERPL-MCNC: 1 MG/DL (ref 0.7–1.2)
EOSINOPHIL # BLD: 0 K/UL (ref 0–0.4)
EOSINOPHIL # BLD: 0.53 K/UL (ref 0–0.4)
EOSINOPHILS RELATIVE PERCENT: 0 % (ref 1–4)
EOSINOPHILS RELATIVE PERCENT: 5 % (ref 1–4)
ERYTHROCYTE [DISTWIDTH] IN BLOOD BY AUTOMATED COUNT: 21.1 % (ref 11.8–14.4)
ERYTHROCYTE [DISTWIDTH] IN BLOOD BY AUTOMATED COUNT: 21.9 % (ref 11.8–14.4)
GFR SERPL CREATININE-BSD FRML MDRD: >60 ML/MIN/1.73M2
GFR SERPL CREATININE-BSD FRML MDRD: >60 ML/MIN/1.73M2
GLUCOSE BLD-MCNC: 115 MG/DL (ref 75–110)
GLUCOSE BLD-MCNC: 116 MG/DL (ref 75–110)
GLUCOSE BLD-MCNC: 133 MG/DL (ref 75–110)
GLUCOSE BLD-MCNC: 134 MG/DL (ref 75–110)
GLUCOSE SERPL-MCNC: 122 MG/DL (ref 70–99)
GLUCOSE SERPL-MCNC: 146 MG/DL (ref 70–99)
HCT VFR BLD AUTO: 24 % (ref 40.7–50.3)
HCT VFR BLD AUTO: 25.4 % (ref 40.7–50.3)
HGB BLD-MCNC: 7.2 G/DL (ref 13–17)
HGB BLD-MCNC: 7.3 G/DL (ref 13–17)
IMM GRANULOCYTES # BLD AUTO: 0.63 K/UL (ref 0–0.3)
IMM GRANULOCYTES # BLD AUTO: 0.72 K/UL (ref 0–0.3)
IMM GRANULOCYTES NFR BLD: 5 %
IMM GRANULOCYTES NFR BLD: 6 %
LYMPHOCYTES NFR BLD: 0.57 K/UL (ref 1–4.8)
LYMPHOCYTES NFR BLD: 1.05 K/UL (ref 1–4.8)
LYMPHOCYTES RELATIVE PERCENT: 10 % (ref 24–44)
LYMPHOCYTES RELATIVE PERCENT: 4 % (ref 24–44)
MAGNESIUM SERPL-MCNC: 2.3 MG/DL (ref 1.6–2.6)
MAGNESIUM SERPL-MCNC: 2.4 MG/DL (ref 1.6–2.6)
MCH RBC QN AUTO: 30 PG (ref 25.2–33.5)
MCH RBC QN AUTO: 30.8 PG (ref 25.2–33.5)
MCHC RBC AUTO-ENTMCNC: 28.7 G/DL (ref 28.4–34.8)
MCHC RBC AUTO-ENTMCNC: 30 G/DL (ref 28.4–34.8)
MCV RBC AUTO: 100 FL (ref 82.6–102.9)
MCV RBC AUTO: 107.2 FL (ref 82.6–102.9)
MONOCYTES NFR BLD: 0.29 K/UL (ref 0.1–0.8)
MONOCYTES NFR BLD: 0.74 K/UL (ref 0.1–0.8)
MONOCYTES NFR BLD: 2 % (ref 1–7)
MONOCYTES NFR BLD: 7 % (ref 1–7)
MORPHOLOGY: ABNORMAL
NEUTROPHILS NFR BLD: 72 % (ref 36–66)
NEUTROPHILS NFR BLD: 89 % (ref 36–66)
NEUTS SEG NFR BLD: 12.72 K/UL (ref 1.8–7.7)
NEUTS SEG NFR BLD: 7.55 K/UL (ref 1.8–7.7)
NRBC BLD-RTO: 0 PER 100 WBC
NRBC BLD-RTO: 0 PER 100 WBC
NUCLEATED RED BLOOD CELLS: 1 PER 100 WBC
PHOSPHATE SERPL-MCNC: 3.6 MG/DL (ref 2.5–4.5)
PLATELET # BLD AUTO: 100 K/UL (ref 138–453)
PLATELET # BLD AUTO: 88 K/UL (ref 138–453)
PMV BLD AUTO: 10.1 FL (ref 8.1–13.5)
PMV BLD AUTO: 10.4 FL (ref 8.1–13.5)
POTASSIUM SERPL-SCNC: 4 MMOL/L (ref 3.7–5.3)
POTASSIUM SERPL-SCNC: 4.5 MMOL/L (ref 3.7–5.3)
RBC # BLD AUTO: 2.37 M/UL (ref 4.21–5.77)
RBC # BLD AUTO: 2.4 M/UL (ref 4.21–5.77)
SODIUM SERPL-SCNC: 137 MMOL/L (ref 135–144)
SODIUM SERPL-SCNC: 138 MMOL/L (ref 135–144)
WBC OTHER # BLD: 10.5 K/UL (ref 3.5–11.3)
WBC OTHER # BLD: 14.3 K/UL (ref 3.5–11.3)

## 2024-01-26 PROCEDURE — 3600000014 HC SURGERY LEVEL 4 ADDTL 15MIN: Performed by: SURGERY

## 2024-01-26 PROCEDURE — 0WUF0KZ SUPPLEMENT ABDOMINAL WALL WITH NONAUTOLOGOUS TISSUE SUBSTITUTE, OPEN APPROACH: ICD-10-PCS | Performed by: SURGERY

## 2024-01-26 PROCEDURE — 15274 SKN SUB GRFT T/A/L CHILD ADD: CPT | Performed by: SURGERY

## 2024-01-26 PROCEDURE — 2060000000 HC ICU INTERMEDIATE R&B

## 2024-01-26 PROCEDURE — 2580000003 HC RX 258: Performed by: SURGERY

## 2024-01-26 PROCEDURE — 6370000000 HC RX 637 (ALT 250 FOR IP)

## 2024-01-26 PROCEDURE — 15273 SKIN SUB GRFT T/ARM/LG CHILD: CPT | Performed by: SURGERY

## 2024-01-26 PROCEDURE — 2580000003 HC RX 258

## 2024-01-26 PROCEDURE — 7100000001 HC PACU RECOVERY - ADDTL 15 MIN: Performed by: SURGERY

## 2024-01-26 PROCEDURE — 86900 BLOOD TYPING SEROLOGIC ABO: CPT

## 2024-01-26 PROCEDURE — 94761 N-INVAS EAR/PLS OXIMETRY MLT: CPT

## 2024-01-26 PROCEDURE — 36415 COLL VENOUS BLD VENIPUNCTURE: CPT

## 2024-01-26 PROCEDURE — 3700000000 HC ANESTHESIA ATTENDED CARE: Performed by: SURGERY

## 2024-01-26 PROCEDURE — 7100000000 HC PACU RECOVERY - FIRST 15 MIN: Performed by: SURGERY

## 2024-01-26 PROCEDURE — 15003 WOUND PREP ADDL 100 CM: CPT | Performed by: SURGERY

## 2024-01-26 PROCEDURE — 80048 BASIC METABOLIC PNL TOTAL CA: CPT

## 2024-01-26 PROCEDURE — 6370000000 HC RX 637 (ALT 250 FOR IP): Performed by: STUDENT IN AN ORGANIZED HEALTH CARE EDUCATION/TRAINING PROGRAM

## 2024-01-26 PROCEDURE — 6360000002 HC RX W HCPCS

## 2024-01-26 PROCEDURE — 86901 BLOOD TYPING SEROLOGIC RH(D): CPT

## 2024-01-26 PROCEDURE — 2500000003 HC RX 250 WO HCPCS: Performed by: NURSE ANESTHETIST, CERTIFIED REGISTERED

## 2024-01-26 PROCEDURE — 86920 COMPATIBILITY TEST SPIN: CPT

## 2024-01-26 PROCEDURE — 82947 ASSAY GLUCOSE BLOOD QUANT: CPT

## 2024-01-26 PROCEDURE — 83735 ASSAY OF MAGNESIUM: CPT

## 2024-01-26 PROCEDURE — 2720000010 HC SURG SUPPLY STERILE: Performed by: SURGERY

## 2024-01-26 PROCEDURE — P9045 ALBUMIN (HUMAN), 5%, 250 ML: HCPCS

## 2024-01-26 PROCEDURE — 3700000001 HC ADD 15 MINUTES (ANESTHESIA): Performed by: SURGERY

## 2024-01-26 PROCEDURE — 2700000000 HC OXYGEN THERAPY PER DAY

## 2024-01-26 PROCEDURE — 2500000003 HC RX 250 WO HCPCS

## 2024-01-26 PROCEDURE — 2709999900 HC NON-CHARGEABLE SUPPLY: Performed by: SURGERY

## 2024-01-26 PROCEDURE — 82330 ASSAY OF CALCIUM: CPT

## 2024-01-26 PROCEDURE — 84100 ASSAY OF PHOSPHORUS: CPT

## 2024-01-26 PROCEDURE — 3600000004 HC SURGERY LEVEL 4 BASE: Performed by: SURGERY

## 2024-01-26 PROCEDURE — 6360000002 HC RX W HCPCS: Performed by: ANESTHESIOLOGY

## 2024-01-26 PROCEDURE — 6360000002 HC RX W HCPCS: Performed by: STUDENT IN AN ORGANIZED HEALTH CARE EDUCATION/TRAINING PROGRAM

## 2024-01-26 PROCEDURE — 15002 WOUND PREP TRK/ARM/LEG: CPT | Performed by: SURGERY

## 2024-01-26 PROCEDURE — 85025 COMPLETE CBC W/AUTO DIFF WBC: CPT

## 2024-01-26 PROCEDURE — 97606 NEG PRS WND THER DME>50 SQCM: CPT | Performed by: SURGERY

## 2024-01-26 PROCEDURE — 94003 VENT MGMT INPAT SUBQ DAY: CPT

## 2024-01-26 PROCEDURE — 86850 RBC ANTIBODY SCREEN: CPT

## 2024-01-26 DEVICE — IMPLANTABLE DEVICE: Type: IMPLANTABLE DEVICE | Site: ABDOMEN | Status: FUNCTIONAL

## 2024-01-26 DEVICE — DRESSING WND 3 LAYR 7X10 CM MTRX CYTAL: Type: IMPLANTABLE DEVICE | Site: ABDOMEN | Status: FUNCTIONAL

## 2024-01-26 RX ORDER — ONDANSETRON 2 MG/ML
INJECTION INTRAMUSCULAR; INTRAVENOUS PRN
Status: DISCONTINUED | OUTPATIENT
Start: 2024-01-26 | End: 2024-01-26 | Stop reason: SDUPTHER

## 2024-01-26 RX ORDER — DEXAMETHASONE SODIUM PHOSPHATE 10 MG/ML
INJECTION INTRAMUSCULAR; INTRAVENOUS PRN
Status: DISCONTINUED | OUTPATIENT
Start: 2024-01-26 | End: 2024-01-26 | Stop reason: SDUPTHER

## 2024-01-26 RX ORDER — SODIUM CHLORIDE 0.9 % (FLUSH) 0.9 %
5-40 SYRINGE (ML) INJECTION PRN
Status: DISCONTINUED | OUTPATIENT
Start: 2024-01-26 | End: 2024-01-26 | Stop reason: HOSPADM

## 2024-01-26 RX ORDER — SODIUM CHLORIDE 0.9 % (FLUSH) 0.9 %
5-40 SYRINGE (ML) INJECTION EVERY 12 HOURS SCHEDULED
Status: DISCONTINUED | OUTPATIENT
Start: 2024-01-26 | End: 2024-01-26 | Stop reason: HOSPADM

## 2024-01-26 RX ORDER — ROCURONIUM BROMIDE 10 MG/ML
INJECTION, SOLUTION INTRAVENOUS PRN
Status: DISCONTINUED | OUTPATIENT
Start: 2024-01-26 | End: 2024-01-26 | Stop reason: SDUPTHER

## 2024-01-26 RX ORDER — ONDANSETRON 2 MG/ML
4 INJECTION INTRAMUSCULAR; INTRAVENOUS
Status: DISCONTINUED | OUTPATIENT
Start: 2024-01-26 | End: 2024-01-26 | Stop reason: HOSPADM

## 2024-01-26 RX ORDER — LABETALOL HYDROCHLORIDE 5 MG/ML
10 INJECTION, SOLUTION INTRAVENOUS
Status: DISCONTINUED | OUTPATIENT
Start: 2024-01-26 | End: 2024-01-26 | Stop reason: HOSPADM

## 2024-01-26 RX ORDER — MAGNESIUM HYDROXIDE 1200 MG/15ML
LIQUID ORAL CONTINUOUS PRN
Status: COMPLETED | OUTPATIENT
Start: 2024-01-26 | End: 2024-01-26

## 2024-01-26 RX ORDER — MIDAZOLAM HYDROCHLORIDE 1 MG/ML
INJECTION INTRAMUSCULAR; INTRAVENOUS PRN
Status: DISCONTINUED | OUTPATIENT
Start: 2024-01-26 | End: 2024-01-26 | Stop reason: SDUPTHER

## 2024-01-26 RX ORDER — SODIUM CHLORIDE 9 MG/ML
INJECTION, SOLUTION INTRAVENOUS PRN
Status: DISCONTINUED | OUTPATIENT
Start: 2024-01-26 | End: 2024-01-26 | Stop reason: HOSPADM

## 2024-01-26 RX ORDER — ALBUMIN, HUMAN INJ 5% 5 %
SOLUTION INTRAVENOUS PRN
Status: DISCONTINUED | OUTPATIENT
Start: 2024-01-26 | End: 2024-01-26 | Stop reason: SDUPTHER

## 2024-01-26 RX ORDER — HYDRALAZINE HYDROCHLORIDE 20 MG/ML
10 INJECTION INTRAMUSCULAR; INTRAVENOUS
Status: DISCONTINUED | OUTPATIENT
Start: 2024-01-26 | End: 2024-01-26 | Stop reason: HOSPADM

## 2024-01-26 RX ORDER — SODIUM CHLORIDE, SODIUM LACTATE, POTASSIUM CHLORIDE, CALCIUM CHLORIDE 600; 310; 30; 20 MG/100ML; MG/100ML; MG/100ML; MG/100ML
INJECTION, SOLUTION INTRAVENOUS CONTINUOUS PRN
Status: DISCONTINUED | OUTPATIENT
Start: 2024-01-26 | End: 2024-01-26 | Stop reason: SDUPTHER

## 2024-01-26 RX ORDER — PHENYLEPHRINE HCL IN 0.9% NACL 1 MG/10 ML
SYRINGE (ML) INTRAVENOUS PRN
Status: DISCONTINUED | OUTPATIENT
Start: 2024-01-26 | End: 2024-01-26 | Stop reason: SDUPTHER

## 2024-01-26 RX ORDER — EPHEDRINE SULFATE/0.9% NACL/PF 50 MG/5 ML
SYRINGE (ML) INTRAVENOUS PRN
Status: DISCONTINUED | OUTPATIENT
Start: 2024-01-26 | End: 2024-01-26 | Stop reason: SDUPTHER

## 2024-01-26 RX ORDER — FENTANYL CITRATE 50 UG/ML
INJECTION, SOLUTION INTRAMUSCULAR; INTRAVENOUS PRN
Status: DISCONTINUED | OUTPATIENT
Start: 2024-01-26 | End: 2024-01-26 | Stop reason: SDUPTHER

## 2024-01-26 RX ADMIN — Medication 100 MCG: at 18:25

## 2024-01-26 RX ADMIN — Medication 10 MG: at 17:24

## 2024-01-26 RX ADMIN — CEFTOLOZANE AND TAZOBACTAM 3000 MG: 1; .5 INJECTION, POWDER, LYOPHILIZED, FOR SOLUTION INTRAVENOUS at 05:33

## 2024-01-26 RX ADMIN — ALBUMIN (HUMAN) 12.5 G: 12.5 INJECTION, SOLUTION INTRAVENOUS at 18:16

## 2024-01-26 RX ADMIN — SODIUM CHLORIDE, POTASSIUM CHLORIDE, SODIUM LACTATE AND CALCIUM CHLORIDE: 600; 310; 30; 20 INJECTION, SOLUTION INTRAVENOUS at 18:12

## 2024-01-26 RX ADMIN — ASPIRIN 81 MG 81 MG: 81 TABLET ORAL at 08:55

## 2024-01-26 RX ADMIN — HEPARIN SODIUM 7500 UNITS: 5000 INJECTION INTRAVENOUS; SUBCUTANEOUS at 05:34

## 2024-01-26 RX ADMIN — ROCURONIUM BROMIDE 50 MG: 10 INJECTION, SOLUTION INTRAVENOUS at 15:47

## 2024-01-26 RX ADMIN — CITALOPRAM 30 MG: 20 TABLET, FILM COATED ORAL at 08:55

## 2024-01-26 RX ADMIN — OXYCODONE HYDROCHLORIDE 10 MG: 5 TABLET ORAL at 23:51

## 2024-01-26 RX ADMIN — AMIODARONE HYDROCHLORIDE 200 MG: 200 TABLET ORAL at 08:54

## 2024-01-26 RX ADMIN — HEPARIN SODIUM 7500 UNITS: 5000 INJECTION INTRAVENOUS; SUBCUTANEOUS at 13:48

## 2024-01-26 RX ADMIN — SODIUM CHLORIDE, POTASSIUM CHLORIDE, SODIUM LACTATE AND CALCIUM CHLORIDE: 600; 310; 30; 20 INJECTION, SOLUTION INTRAVENOUS at 16:24

## 2024-01-26 RX ADMIN — SUGAMMADEX 200 MG: 100 INJECTION, SOLUTION INTRAVENOUS at 19:18

## 2024-01-26 RX ADMIN — MIDODRINE HYDROCHLORIDE 15 MG: 5 TABLET ORAL at 12:10

## 2024-01-26 RX ADMIN — FENTANYL CITRATE 25 MCG: 50 INJECTION, SOLUTION INTRAMUSCULAR; INTRAVENOUS at 18:04

## 2024-01-26 RX ADMIN — FENTANYL CITRATE 50 MCG: 50 INJECTION, SOLUTION INTRAMUSCULAR; INTRAVENOUS at 16:48

## 2024-01-26 RX ADMIN — LANSOPRAZOLE 30 MG: 30 TABLET, ORALLY DISINTEGRATING, DELAYED RELEASE ORAL at 05:34

## 2024-01-26 RX ADMIN — FENTANYL CITRATE 50 MCG: 50 INJECTION, SOLUTION INTRAMUSCULAR; INTRAVENOUS at 16:31

## 2024-01-26 RX ADMIN — OXYCODONE HYDROCHLORIDE 10 MG: 5 TABLET ORAL at 07:59

## 2024-01-26 RX ADMIN — FENTANYL CITRATE 25 MCG: 50 INJECTION, SOLUTION INTRAMUSCULAR; INTRAVENOUS at 18:01

## 2024-01-26 RX ADMIN — ROCURONIUM BROMIDE 30 MG: 10 INJECTION, SOLUTION INTRAVENOUS at 16:22

## 2024-01-26 RX ADMIN — SENNOSIDES 8.8 MG: 8.8 LIQUID ORAL at 08:56

## 2024-01-26 RX ADMIN — FENTANYL CITRATE 50 MCG: 50 INJECTION, SOLUTION INTRAMUSCULAR; INTRAVENOUS at 15:42

## 2024-01-26 RX ADMIN — DEXAMETHASONE SODIUM PHOSPHATE 5 MG: 10 INJECTION INTRAMUSCULAR; INTRAVENOUS at 16:06

## 2024-01-26 RX ADMIN — HEPARIN SODIUM 7500 UNITS: 5000 INJECTION INTRAVENOUS; SUBCUTANEOUS at 23:50

## 2024-01-26 RX ADMIN — SODIUM CHLORIDE, POTASSIUM CHLORIDE, SODIUM LACTATE AND CALCIUM CHLORIDE: 600; 310; 30; 20 INJECTION, SOLUTION INTRAVENOUS at 15:40

## 2024-01-26 RX ADMIN — MIDAZOLAM 2 MG: 1 INJECTION INTRAMUSCULAR; INTRAVENOUS at 15:41

## 2024-01-26 RX ADMIN — Medication 100 MCG: at 18:15

## 2024-01-26 RX ADMIN — DOCUSATE SODIUM LIQUID 100 MG: 100 LIQUID ORAL at 08:55

## 2024-01-26 RX ADMIN — MIDODRINE HYDROCHLORIDE 15 MG: 5 TABLET ORAL at 08:55

## 2024-01-26 RX ADMIN — Medication 100 MCG: at 18:19

## 2024-01-26 RX ADMIN — HYDROMORPHONE HYDROCHLORIDE 0.25 MG: 1 INJECTION, SOLUTION INTRAMUSCULAR; INTRAVENOUS; SUBCUTANEOUS at 20:11

## 2024-01-26 RX ADMIN — ONDANSETRON 4 MG: 2 INJECTION INTRAMUSCULAR; INTRAVENOUS at 17:34

## 2024-01-26 RX ADMIN — SODIUM CHLORIDE, PRESERVATIVE FREE 10 ML: 5 INJECTION INTRAVENOUS at 08:56

## 2024-01-26 RX ADMIN — LEVOTHYROXINE SODIUM 200 MCG: 100 TABLET ORAL at 05:34

## 2024-01-26 ASSESSMENT — PAIN SCALES - WONG BAKER
WONGBAKER_NUMERICALRESPONSE: 0

## 2024-01-26 ASSESSMENT — PAIN SCALES - GENERAL
PAINLEVEL_OUTOF10: 6
PAINLEVEL_OUTOF10: 9
PAINLEVEL_OUTOF10: 8
PAINLEVEL_OUTOF10: 9

## 2024-01-26 ASSESSMENT — PAIN DESCRIPTION - ORIENTATION
ORIENTATION: LEFT;RIGHT
ORIENTATION: LEFT;RIGHT

## 2024-01-26 ASSESSMENT — ENCOUNTER SYMPTOMS: STRIDOR: 0

## 2024-01-26 ASSESSMENT — PULMONARY FUNCTION TESTS
PIF_VALUE: 12
PIF_VALUE: 26
PIF_VALUE: 24
PIF_VALUE: 16

## 2024-01-26 ASSESSMENT — PAIN DESCRIPTION - DESCRIPTORS: DESCRIPTORS: PRESSURE

## 2024-01-26 ASSESSMENT — PAIN DESCRIPTION - LOCATION
LOCATION: ABDOMEN
LOCATION: LEG
LOCATION: ABDOMEN

## 2024-01-26 ASSESSMENT — PAIN - FUNCTIONAL ASSESSMENT: PAIN_FUNCTIONAL_ASSESSMENT: PREVENTS OR INTERFERES SOME ACTIVE ACTIVITIES AND ADLS

## 2024-01-26 ASSESSMENT — PAIN DESCRIPTION - PAIN TYPE: TYPE: SURGICAL PAIN

## 2024-01-26 NOTE — PROGRESS NOTES
01/26/24 0906   Surgical Airway (Trach) 01/02/24 Other (comment)   Placement Date/Time: 01/02/24 1610   Present on Admission/Arrival: Yes  Surgical Airway Type: Tracheostomy  Brand: (c) Other (comment)  Size: 8   Status Secured   Site Assessment Clean;Dry   Site Care Cleansed;Dried;Dressing applied   Inner Cannula Care Changed/new   Ties Assessment Dry;Intact;Secure     Trach care completed

## 2024-01-26 NOTE — PROGRESS NOTES
PROGRESS NOTE          PATIENT NAME: Ruben Dimas  MEDICAL RECORD NO. [id number]  DATE: 1/26/2024  SURGEON: Jake  PRIMARY CARE PHYSICIAN: Ramy Lazo MD    HD: # 25    ASSESSMENT    Patient Active Problem List   Diagnosis    Alcoholic cirrhosis of liver (HCC), sober since 2013    Peripheral edema    Bipolar disorder (HCC)    Type 2 diabetes mellitus with diabetic neuropathy, with long-term current use of insulin (HCC)    Essential hypertension, currently hypotensive    Dyslipidemia    Weakness    Acute metabolic encephalopathy    FABI (obstructive sleep apnea)    Primary osteoarthritis of right knee    Type 2 diabetes mellitus with diabetic neuropathy (HCC)    Acquired hypothyroidism    Urine retention    Anemia    Slow transit constipation    Iron deficiency anemia due to chronic blood loss    Gastroesophageal reflux disease without esophagitis    LEONARDA (acute kidney injury) (HCC)    Secondary esophageal varices without bleeding (HCC)    Portal hypertension (HCC)    Obesity, morbid, BMI 50 or higher (HCC)    Venous stasis dermatitis of both lower extremities    Cellulitis of perineum    Chronic indwelling Clemons catheter    Anxiety and depression    Back pain, chronic    BPH (benign prostatic hyperplasia)    Chronic diastolic CHF (congestive heart failure) (HCC)    Cirrhosis (HCC)    Diabetes mellitus (HCC)    GERD (gastroesophageal reflux disease)    Hepatitis    Hiatal hernia    Hypertension, essential    IBS (irritable bowel syndrome)    Morbid obesity with BMI of 45.0-49.9, adult (HCC)    Neuropathy    Chronic respiratory failure with hypoxia, on home oxygen therapy (HCC)    Sleep apnea    Thyroid disease    Urinary bladder neurogenic dysfunction    Acute UTI    Musculoskeletal immobility    Pyocystis    Myoclonic jerking    Thrombocytopenia (HCC)    Hypotension    Sepsis with acute hypercapnic respiratory failure and septic shock (HCC)    Right lower lobe pneumonia    Acute kidney injury superimposed on

## 2024-01-26 NOTE — PROGRESS NOTES
01/26/24 3730   Care Plan - Respiratory Goals   Achieves optimal ventilation and oxygenation Assess for changes in respiratory status;Assess for changes in mentation and behavior;Position to facilitate oxygenation and minimize respiratory effort;Oxygen supplementation based on oxygen saturation or arterial blood gases;Encourage broncho-pulmonary hygiene including cough, deep breathe, incentive spirometry;Assess the need for suctioning and aspirate as needed;Assess and instruct to report shortness of breath or any respiratory difficulty;Respiratory therapy support as indicated

## 2024-01-26 NOTE — PLAN OF CARE
Problem: Respiratory - Adult  Goal: Achieves optimal ventilation and oxygenation  1/25/2024 2047 by Seb Camejo RCP  Outcome: Progressing     Problem: OXYGENATION/RESPIRATORY FUNCTION  Goal: Patient will maintain patent airway  Outcome: Ongoing  Goal: Patient will achieve/maintain normal respiratory rate/effort  Respiratory rate and effort will be within normal limits for the patient  Outcome: Ongoing    Problem: MECHANICAL VENTILATION  Goal: Patient will maintain patent airway  Outcome: Ongoing  Goal: Oral health is maintained or improved  Outcome: Ongoing  Goal: ET tube will be managed safely  Outcome: Ongoing  Goal: Ability to express needs and understand communication  Outcome: Ongoing  Goal: Mobility/activity is maintained at optimum level for patient  Outcome: Ongoing    Problem: ASPIRATION PRECAUTIONS  Goal: Patient’s risk of aspiration is minimized  Outcome: Ongoing    Problem: SKIN INTEGRITY  Goal: Skin integrity is maintained or improved  Outcome: Ongoing

## 2024-01-26 NOTE — PROGRESS NOTES
Occupational Therapy    [facility]  Occupational Therapy Not Seen Note    DATE: 2024    NAME: Ruben Dimas  MRN: [id number]   : 1965      Patient not seen this date for Occupational Therapy due to:    Surgery/Procedure: ABDOMINAL WOUND RE-EXPLORATION , WOUND VAC CHANGE, POSSIBLE DEBRIDEMENT, POSSIBLE SKIN SUBSTITUTE APPLICATION      Will check back as able.    Electronically signed by JENNY Allen on 2024 at 2:37 PM

## 2024-01-26 NOTE — PLAN OF CARE
Problem: Discharge Planning  Goal: Discharge to home or other facility with appropriate resources  1/26/2024 0508 by Monet Gardner RN  Outcome: Progressing  1/25/2024 1838 by Fabiola Jhaveri RN  Outcome: Progressing  Flowsheets (Taken 1/25/2024 1838)  Discharge to home or other facility with appropriate resources:   Identify barriers to discharge with patient and caregiver   Identify discharge learning needs (meds, wound care, etc)     Problem: Pain  Goal: Verbalizes/displays adequate comfort level or baseline comfort level  1/26/2024 0508 by Monet Gardner RN  Outcome: Progressing  1/25/2024 1838 by Fabioal Jhaveri RN  Outcome: Progressing  Flowsheets (Taken 1/25/2024 1838)  Verbalizes/displays adequate comfort level or baseline comfort level:   Assess pain using appropriate pain scale   Encourage patient to monitor pain and request assistance   Administer analgesics based on type and severity of pain and evaluate response   Implement non-pharmacological measures as appropriate and evaluate response   Consider cultural and social influences on pain and pain management   Notify Licensed Independent Practitioner if interventions unsuccessful or patient reports new pain     Problem: Safety - Adult  Goal: Free from fall injury  1/26/2024 0508 by Monet Gardner RN  Outcome: Progressing  1/25/2024 1838 by Fabiola Jhaveri RN  Outcome: Progressing  Flowsheets (Taken 1/25/2024 1838)  Free From Fall Injury: Instruct family/caregiver on patient safety     Problem: Respiratory - Adult  Goal: Achieves optimal ventilation and oxygenation  1/26/2024 0508 by Monet Gardner RN  Outcome: Progressing  Flowsheets (Taken 1/26/2024 0420 by Yolanda Gay RCP)  Achieves optimal ventilation and oxygenation:   Assess for changes in respiratory status   Assess for changes in mentation and behavior   Position to facilitate oxygenation and minimize respiratory effort   Oxygen supplementation based on oxygen  of physical injury  1/26/2024 0508 by Monet Gardner, RN  Outcome: Progressing  1/25/2024 1838 by Fabiola Jhaveri, RN  Outcome: Progressing  Flowsheets (Taken 1/25/2024 1838)  Absence of Physical Injury: Implement safety measures based on patient assessment     Problem: Nutrition Deficit:  Goal: Optimize nutritional status  1/26/2024 0508 by Monet Gardner, RN  Outcome: Progressing  1/25/2024 1838 by Fabiola Jhaveri, RN  Outcome: Progressing

## 2024-01-26 NOTE — PROGRESS NOTES
01/26/24 1210   Vent Information   Vent Mode (S)  CPAP/PS   Ventilator Settings   FiO2  (S)  40 %   PEEP/CPAP (cmH2O) (S)  8   Pressure Support (cm H2O) (S)  8 cm H2O     SBT initiated

## 2024-01-26 NOTE — PROGRESS NOTES
27 -   Abd prep showed mild duskiness of the wound - disc w Dr Monte  CTAP - shows R lung collapse w large effusion, explaining the recent leukocytosis  1/14 WBC  CXR worse  Repeat Sp cx pseudomonas  R meropenem and S levaquin( resulted 1/18) -   pt on amiodarone  VAC exchange 1/25        AB:  meropenem  stop 1/18  Start zerbaxa 1/18 - till 1/25 -sensitive - completed  Now off AB  Treating R lung collapse in view of the new pseudomonas resistance  pulm toilet  He is better in general      Infection Control Recommendations   [place] Precautions  Contact Isolation       Antimicrobial Stewardship Recommendations   Simplification of therapy  Targeted therapy    History of Present Illness:   Initial history:  Ruben Dimas is a 58 y.o.-year-old male presents from [facility] via EMS for complaint of chest pain/abdominal pain.  Patient has a history of type 2 diabetes melitis, hypertension, hyperlipidemia, hypothyroidism, heart failure with preserved ejection fraction, PEG tube, liver cirrhosis, chronic trach secondary to chronic respiratory failure, A-fib, chronic urinary cath, obesity and bilateral lower extremity lymphedema. Recent hospitalization in December 2023.   On 1/1/2023 patient was brought by EMS where he was given 10 mg of Cardizem in the ambulance as well as on arrival to the ED due to A-fib with RVR on EKG.  Per EMS patient was having low saturations in the 70s at the facility.Patient was rate controlled after the second dose of diltiazem.  Once rate was controlled patient then became hypotensive. He was given 2L of Normal saline in the ED. Central line was placed and patient was started on Cipriano. CT abdomen showed concern for necrotizing fasciitis spanning sternum to pubis and pannus. Clinical finding of subcutaneous emphysema. General surgery was consulted and patient was emergently taken to the OR. In the OR patient underwent partial wedge gastrectomy and lysis of adhesions and explant of  contrast, sludge, and small stones.   7. Incidental 1.9 cm left breast mass in the lower outer quadrant.  Similar   finding was present on the CT of October 21, 2021.  Although stability favors   a benign etiology, CT does not adequately assess breast masses.  Recommend   ultrasound of the breast on a nonemergent basis for further evaluation.       Ctap 1/7/24  Moderate right and small left pleural effusions.  Dependent airspace   opacities in both lungs, favored to represent atelectasis.     Postsurgical changes in the abdomen, with an abdominal wall defect.     No bowel obstruction is seen.  Mild circumferential wall thickening of the   colon near the hepatic flexure, could represent a nonspecific colitis.     Suspected mild intraperitoneal free air.  No significant free fluid is seen   in the abdomen or pelvis.     Cirrhotic morphology of the liver.  Splenomegaly.     Air in the subcutaneous tissues of the abdomen and pelvis.     CTAP 1/1/24  1.  Malposition gastrostomy tube, retracted into the abdominal wall.  Associated extensive subcutaneous emphysema is noted.     2.  No evidence for acute pulmonary embolism, within the limitations of  motion artifact.     3.  Trace pleural effusions and dependent atelectasis, right greater than  left.     4.  Punctate bilateral nephrolithiasis.    I have personally reviewed the past medical history, past surgical history, medications, social history, and family history, and I haveupdated the database accordingly.      Allergies:   No known allergies     Review of Systems:     Review of Systems   Reason unable to perform ROS: trached.       Physical Examination :     Physical Exam  Constitutional:       General: He is not in acute distress.     Appearance: He is obese. He is not ill-appearing, toxic-appearing or diaphoretic.   HENT:      Head: Normocephalic and atraumatic.      Nose: Nose normal. No rhinorrhea.      Mouth/Throat:      Mouth: Mucous membranes are moist.

## 2024-01-27 LAB
ANION GAP SERPL CALCULATED.3IONS-SCNC: 11 MMOL/L (ref 9–17)
BASOPHILS # BLD: 0 K/UL (ref 0–0.2)
BASOPHILS NFR BLD: 0 % (ref 0–2)
BUN SERPL-MCNC: 40 MG/DL (ref 6–20)
CA-I BLD-SCNC: 1.33 MMOL/L (ref 1.13–1.33)
CALCIUM SERPL-MCNC: 8.5 MG/DL (ref 8.6–10.4)
CHLORIDE SERPL-SCNC: 112 MMOL/L (ref 98–107)
CO2 SERPL-SCNC: 15 MMOL/L (ref 20–31)
CREAT SERPL-MCNC: 1 MG/DL (ref 0.7–1.2)
EOSINOPHIL # BLD: 0 K/UL (ref 0–0.4)
EOSINOPHILS RELATIVE PERCENT: 0 % (ref 1–4)
ERYTHROCYTE [DISTWIDTH] IN BLOOD BY AUTOMATED COUNT: 21.9 % (ref 11.8–14.4)
GFR SERPL CREATININE-BSD FRML MDRD: >60 ML/MIN/1.73M2
GLUCOSE BLD-MCNC: 148 MG/DL (ref 75–110)
GLUCOSE BLD-MCNC: 149 MG/DL (ref 75–110)
GLUCOSE BLD-MCNC: 165 MG/DL (ref 75–110)
GLUCOSE BLD-MCNC: 167 MG/DL (ref 75–110)
GLUCOSE BLD-MCNC: 168 MG/DL (ref 75–110)
GLUCOSE BLD-MCNC: 190 MG/DL (ref 75–110)
GLUCOSE BLD-MCNC: 251 MG/DL (ref 75–110)
GLUCOSE BLD-MCNC: 276 MG/DL (ref 75–110)
GLUCOSE SERPL-MCNC: 260 MG/DL (ref 70–99)
HCT VFR BLD AUTO: 21.4 % (ref 40.7–50.3)
HCT VFR BLD AUTO: 23.3 % (ref 40.7–50.3)
HCT VFR BLD AUTO: 24.5 % (ref 40.7–50.3)
HGB BLD-MCNC: 6.7 G/DL (ref 13–17)
HGB BLD-MCNC: 6.7 G/DL (ref 13–17)
HGB BLD-MCNC: 7.5 G/DL (ref 13–17)
IMM GRANULOCYTES # BLD AUTO: 0.6 K/UL (ref 0–0.3)
IMM GRANULOCYTES NFR BLD: 4 %
LYMPHOCYTES NFR BLD: 0.6 K/UL (ref 1–4.8)
LYMPHOCYTES RELATIVE PERCENT: 4 % (ref 24–44)
MAGNESIUM SERPL-MCNC: 2.3 MG/DL (ref 1.6–2.6)
MCH RBC QN AUTO: 29.8 PG (ref 25.2–33.5)
MCHC RBC AUTO-ENTMCNC: 27.3 G/DL (ref 28.4–34.8)
MCV RBC AUTO: 108.9 FL (ref 82.6–102.9)
MONOCYTES NFR BLD: 0.75 K/UL (ref 0.1–0.8)
MONOCYTES NFR BLD: 5 % (ref 1–7)
MORPHOLOGY: ABNORMAL
MORPHOLOGY: ABNORMAL
NEUTROPHILS NFR BLD: 87 % (ref 36–66)
NEUTS SEG NFR BLD: 13.05 K/UL (ref 1.8–7.7)
NRBC BLD-RTO: 0 PER 100 WBC
PHOSPHATE SERPL-MCNC: 5 MG/DL (ref 2.5–4.5)
PLATELET # BLD AUTO: 101 K/UL (ref 138–453)
PMV BLD AUTO: 10.3 FL (ref 8.1–13.5)
POTASSIUM SERPL-SCNC: 4.6 MMOL/L (ref 3.7–5.3)
RBC # BLD AUTO: 2.25 M/UL (ref 4.21–5.77)
SODIUM SERPL-SCNC: 138 MMOL/L (ref 135–144)
WBC OTHER # BLD: 15 K/UL (ref 3.5–11.3)

## 2024-01-27 PROCEDURE — 6370000000 HC RX 637 (ALT 250 FOR IP): Performed by: STUDENT IN AN ORGANIZED HEALTH CARE EDUCATION/TRAINING PROGRAM

## 2024-01-27 PROCEDURE — 2700000000 HC OXYGEN THERAPY PER DAY

## 2024-01-27 PROCEDURE — 85018 HEMOGLOBIN: CPT

## 2024-01-27 PROCEDURE — 36415 COLL VENOUS BLD VENIPUNCTURE: CPT

## 2024-01-27 PROCEDURE — 94761 N-INVAS EAR/PLS OXIMETRY MLT: CPT

## 2024-01-27 PROCEDURE — 83735 ASSAY OF MAGNESIUM: CPT

## 2024-01-27 PROCEDURE — 2580000003 HC RX 258: Performed by: STUDENT IN AN ORGANIZED HEALTH CARE EDUCATION/TRAINING PROGRAM

## 2024-01-27 PROCEDURE — 99024 POSTOP FOLLOW-UP VISIT: CPT | Performed by: SURGERY

## 2024-01-27 PROCEDURE — 80048 BASIC METABOLIC PNL TOTAL CA: CPT

## 2024-01-27 PROCEDURE — 6360000002 HC RX W HCPCS: Performed by: STUDENT IN AN ORGANIZED HEALTH CARE EDUCATION/TRAINING PROGRAM

## 2024-01-27 PROCEDURE — 36430 TRANSFUSION BLD/BLD COMPNT: CPT

## 2024-01-27 PROCEDURE — 94660 CPAP INITIATION&MGMT: CPT

## 2024-01-27 PROCEDURE — 85014 HEMATOCRIT: CPT

## 2024-01-27 PROCEDURE — 99232 SBSQ HOSP IP/OBS MODERATE 35: CPT | Performed by: INTERNAL MEDICINE

## 2024-01-27 PROCEDURE — P9016 RBC LEUKOCYTES REDUCED: HCPCS

## 2024-01-27 PROCEDURE — 94003 VENT MGMT INPAT SUBQ DAY: CPT

## 2024-01-27 PROCEDURE — 84100 ASSAY OF PHOSPHORUS: CPT

## 2024-01-27 PROCEDURE — 85025 COMPLETE CBC W/AUTO DIFF WBC: CPT

## 2024-01-27 PROCEDURE — 82330 ASSAY OF CALCIUM: CPT

## 2024-01-27 PROCEDURE — 2060000000 HC ICU INTERMEDIATE R&B

## 2024-01-27 RX ORDER — SODIUM CHLORIDE 9 MG/ML
INJECTION, SOLUTION INTRAVENOUS PRN
Status: DISCONTINUED | OUTPATIENT
Start: 2024-01-27 | End: 2024-01-31 | Stop reason: HOSPADM

## 2024-01-27 RX ORDER — MIDODRINE HYDROCHLORIDE 5 MG/1
5 TABLET ORAL
Status: DISCONTINUED | OUTPATIENT
Start: 2024-01-27 | End: 2024-01-31 | Stop reason: HOSPADM

## 2024-01-27 RX ADMIN — HEPARIN SODIUM 7500 UNITS: 5000 INJECTION INTRAVENOUS; SUBCUTANEOUS at 13:40

## 2024-01-27 RX ADMIN — AMIODARONE HYDROCHLORIDE 200 MG: 200 TABLET ORAL at 08:44

## 2024-01-27 RX ADMIN — HEPARIN SODIUM 7500 UNITS: 5000 INJECTION INTRAVENOUS; SUBCUTANEOUS at 20:41

## 2024-01-27 RX ADMIN — HEPARIN SODIUM 7500 UNITS: 5000 INJECTION INTRAVENOUS; SUBCUTANEOUS at 08:46

## 2024-01-27 RX ADMIN — OXYCODONE HYDROCHLORIDE 10 MG: 5 TABLET ORAL at 22:20

## 2024-01-27 RX ADMIN — LEVOTHYROXINE SODIUM 200 MCG: 100 TABLET ORAL at 06:16

## 2024-01-27 RX ADMIN — INSULIN LISPRO 4 UNITS: 100 INJECTION, SOLUTION INTRAVENOUS; SUBCUTANEOUS at 09:03

## 2024-01-27 RX ADMIN — LANSOPRAZOLE 30 MG: 30 TABLET, ORALLY DISINTEGRATING, DELAYED RELEASE ORAL at 15:51

## 2024-01-27 RX ADMIN — HYDROMORPHONE HYDROCHLORIDE 0.5 MG: 1 INJECTION, SOLUTION INTRAMUSCULAR; INTRAVENOUS; SUBCUTANEOUS at 20:32

## 2024-01-27 RX ADMIN — OXYCODONE HYDROCHLORIDE 10 MG: 5 TABLET ORAL at 15:16

## 2024-01-27 RX ADMIN — MIDODRINE HYDROCHLORIDE 15 MG: 5 TABLET ORAL at 09:47

## 2024-01-27 RX ADMIN — MIDODRINE HYDROCHLORIDE 5 MG: 5 TABLET ORAL at 15:52

## 2024-01-27 RX ADMIN — LANSOPRAZOLE 30 MG: 30 TABLET, ORALLY DISINTEGRATING, DELAYED RELEASE ORAL at 08:44

## 2024-01-27 RX ADMIN — INSULIN LISPRO 12 UNITS: 100 INJECTION, SOLUTION INTRAVENOUS; SUBCUTANEOUS at 05:15

## 2024-01-27 RX ADMIN — SODIUM CHLORIDE, PRESERVATIVE FREE 10 ML: 5 INJECTION INTRAVENOUS at 20:32

## 2024-01-27 RX ADMIN — ASPIRIN 81 MG 81 MG: 81 TABLET ORAL at 08:46

## 2024-01-27 RX ADMIN — CITALOPRAM 30 MG: 20 TABLET, FILM COATED ORAL at 08:44

## 2024-01-27 ASSESSMENT — PULMONARY FUNCTION TESTS
PIF_VALUE: 14
PIF_VALUE: 16
PIF_VALUE: 15
PIF_VALUE: 16

## 2024-01-27 ASSESSMENT — PAIN SCALES - WONG BAKER
WONGBAKER_NUMERICALRESPONSE: 0

## 2024-01-27 ASSESSMENT — PAIN DESCRIPTION - DESCRIPTORS: DESCRIPTORS: PRESSURE

## 2024-01-27 ASSESSMENT — PAIN DESCRIPTION - ORIENTATION: ORIENTATION: LEFT;RIGHT

## 2024-01-27 ASSESSMENT — PAIN DESCRIPTION - LOCATION
LOCATION: ABDOMEN
LOCATION: ABDOMEN

## 2024-01-27 ASSESSMENT — PAIN SCALES - GENERAL
PAINLEVEL_OUTOF10: 9
PAINLEVEL_OUTOF10: 8
PAINLEVEL_OUTOF10: 8

## 2024-01-27 ASSESSMENT — PAIN DESCRIPTION - PAIN TYPE: TYPE: SURGICAL PAIN

## 2024-01-27 ASSESSMENT — PAIN - FUNCTIONAL ASSESSMENT: PAIN_FUNCTIONAL_ASSESSMENT: PREVENTS OR INTERFERES SOME ACTIVE ACTIVITIES AND ADLS

## 2024-01-27 NOTE — PROGRESS NOTES
Physical Therapy        Physical Therapy Cancel Note      DATE: 2024    NAME: Ruben Dimas  MRN: [id number]   : 1965      Patient not seen this date for Physical Therapy due to:    Patient Declined: pt refused to participate with therapy @ this time. RN aware. Pt would not give a reason for refusal.      Electronically signed by FRANTZ KONG PTA on 2024 at 1:37 PM

## 2024-01-27 NOTE — BRIEF OP NOTE
Brief Postoperative Note      Patient: Ruben Dimas  YOB: 1965  MRN: [id number]    Date of Procedure: 1/26/2024    Pre-Op Diagnosis Codes:     * Necrotizing fasciitis (HCC) [M72.6]    Post-Op Diagnosis: Same       Procedure(s):  Wound preparation with misonix [id number]= 2438sq cm  Application of skin substitute [id number]= 2438sq cm  Negative pressure wound therapy > 50 cm    Surgeon(s):  Escobar Ulloa MD    Assistant:  Resident: Nano Remy MD    Anesthesia: General    Estimated Blood Loss (mL): 200     Complications: None    Specimens:   * No specimens in log *    Implants:  * No implants in log *      Drains:   Negative Pressure Wound Therapy Abdomen Medial;Upper (Active)   Wound Type Surgical 01/26/24 1915   Dressing Type Black Foam 01/26/24 1915   Number of pieces used 1 01/26/24 1915   Cycle Continuous 01/26/24 1915   Target Pressure (mmHg) 125 01/26/24 1915   Canister changed? Yes 01/26/24 1915   Dressing Status Clean, dry & intact 01/26/24 1915   Dressing Changed Changed/New 01/26/24 1915   Drainage Amount Small 01/26/24 1200   Drainage Description Serosanguinous 01/26/24 1200   Dressing Change Due 01/18/24 01/17/24 1600   Output (ml) 0 ml 01/25/24 0527   Wound Assessment Other (Comment) 01/26/24 1200   [place]-wound Assessment Other (Comment) 01/26/24 1200   Odor None 01/26/24 1200       Negative Pressure Wound Therapy Abdomen Lower;Medial;Right (Active)   Wound Type Surgical 01/26/24 1915   Dressing Type Black Foam 01/26/24 1915   Cycle Continuous 01/26/24 1915   Target Pressure (mmHg) 125 01/26/24 1915   Canister changed? Yes 01/26/24 1915   Dressing Status Clean, dry & intact 01/26/24 1915   Dressing Changed Changed/New 01/26/24 1915   Drainage Amount Small 01/26/24 1200   Drainage Description Serosanguinous 01/26/24 1200   Output (ml) 550 ml 01/25/24 0527       Gastrostomy/Enterostomy/Jejunostomy Tube Gastrostomy RUQ 1 20 fr (Active)   Drainage Appearance None 01/26/24 1200   Site  Description Intact 01/26/24 1200   J Port Status Clamped 01/26/24 1200   G Port Status Clamped 01/26/24 1200   Surrounding Skin Clean, dry & intact 01/26/24 1200   Dressing Status Other (Comment) 01/26/24 1200   Dressing Type Open to air 01/26/24 1200   G-Tube Care Completed Yes 01/26/24 1200   Tube Feeding Immune Enhancing 01/25/24 1200   Tube feeding/verify rate (mL/hr) 55 mL/hr 01/25/24 0400   Tube Feeding Intake (mL) 786 ml 01/25/24 0515   Free Water/Flush (mL) 30 mL 01/25/24 0400   Output (mL) 90 ml 01/16/24 0600   Action Taken Other (comment) 01/21/24 0400   Residual Volume (ml) 0 ml 01/15/24 1600       Urinary Catheter 01/14/24 Clemons-Temperature (Active)   Catheter Indications Urinary retention (acute or chronic), continuous bladder irrigation or bladder outlet obstruction 01/26/24 1200   Site Assessment [place] 01/26/24 1200   Urine Color Yellow 01/26/24 1200   Urine Appearance Cloudy 01/26/24 1200   Urine Odor Malodorous 01/26/24 1200   Collection Container Standard 01/26/24 1200   Securement Method Securing device (Describe) 01/26/24 1200   Catheter Care  Soap and water 01/25/24 0000   Catheter Best Practices  Drainage tube clipped to bed;Catheter secured to thigh;Tamper seal intact;Bag below bladder;Lack of dependent loop in tubing;Drainage bag less than half full;Bag not on floor 01/26/24 1200   Status Draining;Patent 01/26/24 1200   Output (mL) 125 mL 01/26/24 1210       [REMOVED] Closed/Suction Drain Lateral;Superior RUQ Bulb (Removed)   Site Description Reddened;Leaking at site 01/19/24 2000   Dressing Status Clean, dry & intact 01/19/24 2000   Drainage Appearance Serous 01/19/24 2000   Drain Status Compressed;To bulb suction 01/19/24 2000   Output (ml) 120 ml 01/19/24 1400       [REMOVED] Closed/Suction Drain Lateral;Inferior RUQ Bulb (Removed)   Site Description Clean, dry & intact 01/09/24 0800   Dressing Status Clean, dry & intact 01/09/24 0800   Drainage Appearance Serous 01/09/24 0800   Drain

## 2024-01-27 NOTE — PLAN OF CARE
Problem: Respiratory - Adult  Goal: Achieves optimal ventilation and oxygenation  Outcome: Progressing   PROVIDE ADEQUATE OXYGENATION WITH ACCEPTABLE SP02/ABG'S    [x]  IDENTIFY APPROPRIATE OXYGEN THERAPY  [x]   MONITOR SP02/ABG'S AS NEEDED   [x]   PATIENT EDUCATION AS NEEDED   MOBILIZE SECRETIONS    [x]   ASSESS BREATH SOUNDS  [x]   ASSESS SPUTUM PRODUCTION  [x]   COUGH AND DEEP BREATHING  []  IMPLEMENT SECRETION MANAGEMENT PROTOCOL  [x]   PATIENT EDUCATION AS NEEDED

## 2024-01-27 NOTE — PROGRESS NOTES
PROGRESS NOTE          PATIENT NAME: Ruben Dimas  MEDICAL RECORD NO. [id number]  DATE: 1/27/2024  SURGEON: Jake  PRIMARY CARE PHYSICIAN: Ramy Lazo MD    HD: # 26    ASSESSMENT    Patient Active Problem List   Diagnosis    Alcoholic cirrhosis of liver (HCC), sober since 2013    Peripheral edema    Bipolar disorder (HCC)    Type 2 diabetes mellitus with diabetic neuropathy, with long-term current use of insulin (HCC)    Essential hypertension, currently hypotensive    Dyslipidemia    Weakness    Acute metabolic encephalopathy    FABI (obstructive sleep apnea)    Primary osteoarthritis of right knee    Type 2 diabetes mellitus with diabetic neuropathy (HCC)    Acquired hypothyroidism    Urine retention    Anemia    Slow transit constipation    Iron deficiency anemia due to chronic blood loss    Gastroesophageal reflux disease without esophagitis    LEONARDA (acute kidney injury) (HCC)    Secondary esophageal varices without bleeding (HCC)    Portal hypertension (HCC)    Obesity, morbid, BMI 50 or higher (HCC)    Venous stasis dermatitis of both lower extremities    Cellulitis of perineum    Chronic indwelling Clemons catheter    Anxiety and depression    Back pain, chronic    BPH (benign prostatic hyperplasia)    Chronic diastolic CHF (congestive heart failure) (HCC)    Cirrhosis (HCC)    Diabetes mellitus (HCC)    GERD (gastroesophageal reflux disease)    Hepatitis    Hiatal hernia    Hypertension, essential    IBS (irritable bowel syndrome)    Morbid obesity with BMI of 45.0-49.9, adult (HCC)    Neuropathy    Chronic respiratory failure with hypoxia, on home oxygen therapy (HCC)    Sleep apnea    Thyroid disease    Urinary bladder neurogenic dysfunction    Acute UTI    Musculoskeletal immobility    Pyocystis    Myoclonic jerking    Thrombocytopenia (HCC)    Hypotension    Sepsis with acute hypercapnic respiratory failure and septic shock (HCC)    Right lower lobe pneumonia    Acute kidney injury superimposed on

## 2024-01-28 ENCOUNTER — APPOINTMENT (OUTPATIENT)
Dept: GENERAL RADIOLOGY | Age: 59
DRG: 853 | End: 2024-01-28
Payer: MEDICARE

## 2024-01-28 LAB
ANION GAP SERPL CALCULATED.3IONS-SCNC: 11 MMOL/L (ref 9–17)
BACTERIA URNS QL MICRO: ABNORMAL
BASOPHILS # BLD: 0 K/UL (ref 0–0.2)
BASOPHILS NFR BLD: 0 % (ref 0–2)
BILIRUB UR QL STRIP: NEGATIVE
BUN SERPL-MCNC: 40 MG/DL (ref 6–20)
CA-I BLD-SCNC: 1.27 MMOL/L (ref 1.13–1.33)
CALCIUM SERPL-MCNC: 8.5 MG/DL (ref 8.6–10.4)
CASTS #/AREA URNS LPF: ABNORMAL /LPF (ref 0–8)
CHLORIDE SERPL-SCNC: 107 MMOL/L (ref 98–107)
CLARITY UR: ABNORMAL
CO2 SERPL-SCNC: 16 MMOL/L (ref 20–31)
COLOR UR: YELLOW
CREAT SERPL-MCNC: 1.2 MG/DL (ref 0.7–1.2)
EOSINOPHIL # BLD: 0 K/UL (ref 0–0.4)
EOSINOPHILS RELATIVE PERCENT: 0 % (ref 1–4)
EPI CELLS #/AREA URNS HPF: ABNORMAL /HPF (ref 0–5)
ERYTHROCYTE [DISTWIDTH] IN BLOOD BY AUTOMATED COUNT: 22.5 % (ref 11.8–14.4)
GFR SERPL CREATININE-BSD FRML MDRD: >60 ML/MIN/1.73M2
GLUCOSE BLD-MCNC: 129 MG/DL (ref 75–110)
GLUCOSE BLD-MCNC: 130 MG/DL (ref 75–110)
GLUCOSE BLD-MCNC: 131 MG/DL (ref 75–110)
GLUCOSE BLD-MCNC: 133 MG/DL (ref 75–110)
GLUCOSE BLD-MCNC: 144 MG/DL (ref 75–110)
GLUCOSE BLD-MCNC: 145 MG/DL (ref 75–110)
GLUCOSE BLD-MCNC: 158 MG/DL (ref 75–110)
GLUCOSE SERPL-MCNC: 135 MG/DL (ref 70–99)
GLUCOSE UR STRIP-MCNC: NEGATIVE MG/DL
HCT VFR BLD AUTO: 26.1 % (ref 40.7–50.3)
HGB BLD-MCNC: 8.2 G/DL (ref 13–17)
HGB UR QL STRIP.AUTO: ABNORMAL
IMM GRANULOCYTES # BLD AUTO: 0.69 K/UL (ref 0–0.3)
IMM GRANULOCYTES NFR BLD: 3 %
KETONES UR STRIP-MCNC: NEGATIVE MG/DL
LEUKOCYTE ESTERASE UR QL STRIP: ABNORMAL
LYMPHOCYTES NFR BLD: 0.92 K/UL (ref 1–4.8)
LYMPHOCYTES RELATIVE PERCENT: 4 % (ref 24–44)
MAGNESIUM SERPL-MCNC: 2.1 MG/DL (ref 1.6–2.6)
MCH RBC QN AUTO: 30.1 PG (ref 25.2–33.5)
MCHC RBC AUTO-ENTMCNC: 31.4 G/DL (ref 28.4–34.8)
MCV RBC AUTO: 96 FL (ref 82.6–102.9)
MONOCYTES NFR BLD: 10 % (ref 1–7)
MONOCYTES NFR BLD: 2.3 K/UL (ref 0.1–0.8)
MORPHOLOGY: ABNORMAL
NEUTROPHILS NFR BLD: 83 % (ref 36–66)
NEUTS SEG NFR BLD: 19.09 K/UL (ref 1.8–7.7)
NITRITE UR QL STRIP: NEGATIVE
NRBC BLD-RTO: 0.1 PER 100 WBC
PH UR STRIP: 6 [PH] (ref 5–8)
PHOSPHATE SERPL-MCNC: 4.8 MG/DL (ref 2.5–4.5)
PLATELET # BLD AUTO: 132 K/UL (ref 138–453)
PMV BLD AUTO: 9.8 FL (ref 8.1–13.5)
POTASSIUM SERPL-SCNC: 4.3 MMOL/L (ref 3.7–5.3)
PROT UR STRIP-MCNC: ABNORMAL MG/DL
RBC # BLD AUTO: 2.72 M/UL (ref 4.21–5.77)
RBC #/AREA URNS HPF: ABNORMAL /HPF (ref 0–4)
SODIUM SERPL-SCNC: 134 MMOL/L (ref 135–144)
SP GR UR STRIP: 1.01 (ref 1–1.03)
UROBILINOGEN UR STRIP-ACNC: NORMAL EU/DL (ref 0–1)
WBC #/AREA URNS HPF: ABNORMAL /HPF (ref 0–5)
WBC OTHER # BLD: 23 K/UL (ref 3.5–11.3)

## 2024-01-28 PROCEDURE — 87077 CULTURE AEROBIC IDENTIFY: CPT

## 2024-01-28 PROCEDURE — 6370000000 HC RX 637 (ALT 250 FOR IP): Performed by: STUDENT IN AN ORGANIZED HEALTH CARE EDUCATION/TRAINING PROGRAM

## 2024-01-28 PROCEDURE — 71045 X-RAY EXAM CHEST 1 VIEW: CPT

## 2024-01-28 PROCEDURE — 87186 SC STD MICRODIL/AGAR DIL: CPT

## 2024-01-28 PROCEDURE — 2580000003 HC RX 258: Performed by: INTERNAL MEDICINE

## 2024-01-28 PROCEDURE — 2700000000 HC OXYGEN THERAPY PER DAY

## 2024-01-28 PROCEDURE — 84100 ASSAY OF PHOSPHORUS: CPT

## 2024-01-28 PROCEDURE — 81001 URINALYSIS AUTO W/SCOPE: CPT

## 2024-01-28 PROCEDURE — 2060000000 HC ICU INTERMEDIATE R&B

## 2024-01-28 PROCEDURE — 6360000002 HC RX W HCPCS: Performed by: STUDENT IN AN ORGANIZED HEALTH CARE EDUCATION/TRAINING PROGRAM

## 2024-01-28 PROCEDURE — 80048 BASIC METABOLIC PNL TOTAL CA: CPT

## 2024-01-28 PROCEDURE — 36415 COLL VENOUS BLD VENIPUNCTURE: CPT

## 2024-01-28 PROCEDURE — 87070 CULTURE OTHR SPECIMN AEROBIC: CPT

## 2024-01-28 PROCEDURE — 82947 ASSAY GLUCOSE BLOOD QUANT: CPT

## 2024-01-28 PROCEDURE — 85025 COMPLETE CBC W/AUTO DIFF WBC: CPT

## 2024-01-28 PROCEDURE — 94761 N-INVAS EAR/PLS OXIMETRY MLT: CPT

## 2024-01-28 PROCEDURE — 87205 SMEAR GRAM STAIN: CPT

## 2024-01-28 PROCEDURE — 87181 SC STD AGAR DILUTION PER AGT: CPT

## 2024-01-28 PROCEDURE — 83735 ASSAY OF MAGNESIUM: CPT

## 2024-01-28 PROCEDURE — 6360000002 HC RX W HCPCS: Performed by: INTERNAL MEDICINE

## 2024-01-28 PROCEDURE — 94003 VENT MGMT INPAT SUBQ DAY: CPT

## 2024-01-28 PROCEDURE — 82330 ASSAY OF CALCIUM: CPT

## 2024-01-28 RX ADMIN — OXYCODONE HYDROCHLORIDE 10 MG: 5 TABLET ORAL at 20:33

## 2024-01-28 RX ADMIN — CEFTOLOZANE AND TAZOBACTAM 1500 MG: 1; .5 INJECTION, POWDER, LYOPHILIZED, FOR SOLUTION INTRAVENOUS at 22:11

## 2024-01-28 RX ADMIN — MIDODRINE HYDROCHLORIDE 5 MG: 5 TABLET ORAL at 17:31

## 2024-01-28 RX ADMIN — CITALOPRAM 30 MG: 20 TABLET, FILM COATED ORAL at 07:44

## 2024-01-28 RX ADMIN — MIDODRINE HYDROCHLORIDE 5 MG: 5 TABLET ORAL at 12:09

## 2024-01-28 RX ADMIN — HYDROMORPHONE HYDROCHLORIDE 0.5 MG: 1 INJECTION, SOLUTION INTRAMUSCULAR; INTRAVENOUS; SUBCUTANEOUS at 14:42

## 2024-01-28 RX ADMIN — LANSOPRAZOLE 30 MG: 30 TABLET, ORALLY DISINTEGRATING, DELAYED RELEASE ORAL at 07:45

## 2024-01-28 RX ADMIN — HEPARIN SODIUM 7500 UNITS: 5000 INJECTION INTRAVENOUS; SUBCUTANEOUS at 14:32

## 2024-01-28 RX ADMIN — HEPARIN SODIUM 7500 UNITS: 5000 INJECTION INTRAVENOUS; SUBCUTANEOUS at 07:41

## 2024-01-28 RX ADMIN — ASPIRIN 81 MG 81 MG: 81 TABLET ORAL at 12:09

## 2024-01-28 RX ADMIN — HEPARIN SODIUM 7500 UNITS: 5000 INJECTION INTRAVENOUS; SUBCUTANEOUS at 21:42

## 2024-01-28 RX ADMIN — OXYCODONE HYDROCHLORIDE 10 MG: 5 TABLET ORAL at 10:33

## 2024-01-28 RX ADMIN — AMIODARONE HYDROCHLORIDE 200 MG: 200 TABLET ORAL at 07:44

## 2024-01-28 RX ADMIN — LEVOTHYROXINE SODIUM 200 MCG: 100 TABLET ORAL at 07:44

## 2024-01-28 RX ADMIN — MIDODRINE HYDROCHLORIDE 5 MG: 5 TABLET ORAL at 07:44

## 2024-01-28 ASSESSMENT — PAIN SCALES - GENERAL
PAINLEVEL_OUTOF10: 7
PAINLEVEL_OUTOF10: 8

## 2024-01-28 ASSESSMENT — PULMONARY FUNCTION TESTS
PIF_VALUE: 22
PIF_VALUE: 13
PIF_VALUE: 22
PIF_VALUE: 20
PIF_VALUE: 14
PIF_VALUE: 24

## 2024-01-28 ASSESSMENT — PAIN DESCRIPTION - ORIENTATION: ORIENTATION: LEFT

## 2024-01-28 ASSESSMENT — PAIN DESCRIPTION - LOCATION
LOCATION: ABDOMEN
LOCATION: GENERALIZED

## 2024-01-28 NOTE — PROGRESS NOTES
Physical Therapy        Physical Therapy Cancel Note      DATE: 2024    NAME: Ruben Dimas  MRN: [id number]   : 1965      Patient not seen this date for Physical Therapy due to:    Patient Declined: pt defer d/t reports of watching the football game.      Electronically signed by FRANTZ KONG PTA on 2024 at 3:39 PM

## 2024-01-28 NOTE — PLAN OF CARE
Problem: Respiratory - Adult  Goal: Achieves optimal ventilation and oxygenation  1/28/2024 0825 by Sandra Sellers RCP  Outcome: Progressing   MECHANICAL VENTILATION     [x]  PROVIDE OPTIMAL VENTILATION  [x]   ASSESS FOR WEANING READINESS  [x]  IMPLEMENT ADULT MECHANICAL VENTILATION PROTOCOL  [x]  MAINTAIN ADEQUATE OXYGENATION  [x]  PERFORM SPONTANEOUS WEANING TRIAL AS TOLERATED

## 2024-01-28 NOTE — PLAN OF CARE
Problem: Discharge Planning  Goal: Discharge to home or other facility with appropriate resources  Outcome: Progressing     Problem: Pain  Goal: Verbalizes/displays adequate comfort level or baseline comfort level  Outcome: Progressing     Problem: Safety - Adult  Goal: Free from fall injury  Outcome: Progressing     Problem: Respiratory - Adult  Goal: Achieves optimal ventilation and oxygenation  Outcome: Progressing

## 2024-01-28 NOTE — PROGRESS NOTES
Occupational Therapy    [facility]  Occupational Therapy Not Seen Note    DATE: 2024    NAME: Ruben Dimas  MRN: [id number]   : 1965      Patient not seen this date for Occupational Therapy due to:        MIRIAM attempted treatment session this afternoon with patient decline session stating, \"I'm watching football game.\" MIRIAM educated patient on importance of all therapy participation with patient continue to decline. Will continue as able.    Electronically signed by JENNY Blackmon on 2024 at 3:31 PM

## 2024-01-28 NOTE — OP NOTE
Drain Status Compressed 01/09/24 0900   Output (ml) 0 ml 01/09/24 1109       [REMOVED] NG/OG/NJ/NE Tube Nasogastric Right nostril (Removed)   Surrounding Skin Clean, dry & intact 01/08/24 1735   Securement device Bridle 01/08/24 1735   Status Clamped 01/08/24 1735   Placement Verified External Catheter Length 01/08/24 1735   NG/OG/NJ/NE External Measurement (cm) 60 cm 01/08/24 1735   Drainage Appearance Bloody;Dark Red 01/08/24 1735   Free Water/Flush (mL) 80 mL 01/08/24 0400   Output (mL) 100 ml 01/08/24 0400   Action Taken Placement verified (comment) 01/08/24 1735       [REMOVED] Gastrostomy/Enterostomy/Jejunostomy Tube Percutaneous Endoscopic Gastrostomy (PEG) 20 fr (Removed)       [REMOVED] Colostomy RLQ  (Removed)       [REMOVED] Urostomy RLQ (Removed)   Stoma  Assessment Other (Comment) 01/20/24 0429   Collection Container Standard 01/20/24 0429       [REMOVED] Urostomy  RLQ (Removed)   Collection Container Standard 01/24/24 0800   Output (ml) 0 ml 01/24/24 0502       [REMOVED] Urinary Catheter Clemons (Removed)   Catheter Indications Urinary retention (acute or chronic), continuous bladder irrigation or bladder outlet obstruction 01/02/24 2000   Site Assessment Pink 01/02/24 2000   Urine Color Carisa 01/02/24 2000   Urine Appearance Clear 01/02/24 2000   Collection Container Standard 01/02/24 2000   Securement Method Securing device (Describe) 01/02/24 2000   Catheter Care  Soap and water 01/02/24 0800   Catheter Best Practices  Drainage tube clipped to bed;Catheter secured to thigh;Tamper seal intact;Bag below bladder;Bag not on floor;Lack of dependent loop in tubing;Drainage bag less than half full 01/02/24 2000   Status Draining 01/02/24 2000   Output (mL) 75 mL 01/02/24 1856       [REMOVED] Urinary Catheter 01/02/24 2 Way (Removed)   Catheter Indications Urinary retention (acute or chronic), continuous bladder irrigation or bladder outlet obstruction 01/10/24 0800   Site Assessment No urethral drainage  healing I elected to reapply skin substitutes over good granular beds and suture these to the abdominal musculature.     The wound bed was measured to [id number]= 2438sq cm, and noted to have healthy appearing granulation tissue. The Xenmatrix mesh  was in place in the epigastric area, with a small area of disruption of the mesh showing granulating bowel, which is unchanged from last operation.  Masonix was used to mechanically debride and prepare the entire open wound. using 0 pds we sutured down the abdomninal wound eduges to the abdomainal wall with 2 layer cytal suture in place in these undermined locations. we applied skin substitute  6-layer Cytal wound matrix,  3-layer Cytal , and micromatrix  Adaptic gauze was placed over the entire abdomen and black foam wound vac over entire abdomen. The wound vac x2 was set to continuous suction at 125mmHg and had a good seal at the end of the case. All counts were correct at the end of the case.      The patient was transferred back to the ICU in stable condition. He tolerated the procedure well. Plan for takeback to OR next wenesday for wound prepeation, application of skin substitute, and wound vac placement.     Electronically signed by Escobar Ulloa MD on 1/28/2024 at 12:51 PM

## 2024-01-28 NOTE — PLAN OF CARE
MECHANICAL VENTILATION     [x]  PROVIDE OPTIMAL VENTILATION  []   ASSESS FOR EXTUBATION READINESS  [x]   ASSESS FOR WEANING READINESS  []  EXTUBATE AS TOLERATED  []  IMPLEMENT ADULT MECHANICAL VENTILATION PROTOCOL  [x]  MAINTAIN ADEQUATE OXYGENATION  [x]  PERFORM SPONTANEOUS WEANING TRIAL AS TOLERATED

## 2024-01-29 LAB
ALBUMIN SERPL-MCNC: 2.1 G/DL (ref 3.5–5.2)
ALBUMIN/GLOB SERPL: 0.6 {RATIO} (ref 1–2.5)
ALP SERPL-CCNC: 432 U/L (ref 40–129)
ALT SERPL-CCNC: 14 U/L (ref 5–41)
ANION GAP SERPL CALCULATED.3IONS-SCNC: 13 MMOL/L (ref 9–17)
AST SERPL-CCNC: 14 U/L
BASOPHILS # BLD: 0 K/UL (ref 0–0.2)
BASOPHILS NFR BLD: 0 % (ref 0–2)
BILIRUB DIRECT SERPL-MCNC: 0.5 MG/DL
BILIRUB INDIRECT SERPL-MCNC: 0.2 MG/DL (ref 0–1)
BILIRUB SERPL-MCNC: 0.7 MG/DL (ref 0.3–1.2)
BUN SERPL-MCNC: 39 MG/DL (ref 6–20)
CA-I BLD-SCNC: 1.25 MMOL/L (ref 1.13–1.33)
CALCIUM SERPL-MCNC: 8.3 MG/DL (ref 8.6–10.4)
CHLORIDE SERPL-SCNC: 106 MMOL/L (ref 98–107)
CO2 SERPL-SCNC: 14 MMOL/L (ref 20–31)
CREAT SERPL-MCNC: 1.3 MG/DL (ref 0.7–1.2)
EOSINOPHIL # BLD: 0.14 K/UL (ref 0–0.44)
EOSINOPHILS RELATIVE PERCENT: 1 % (ref 1–4)
ERYTHROCYTE [DISTWIDTH] IN BLOOD BY AUTOMATED COUNT: 22.6 % (ref 11.8–14.4)
GFR SERPL CREATININE-BSD FRML MDRD: >60 ML/MIN/1.73M2
GLUCOSE BLD-MCNC: 126 MG/DL (ref 75–110)
GLUCOSE BLD-MCNC: 132 MG/DL (ref 75–110)
GLUCOSE BLD-MCNC: 136 MG/DL (ref 75–110)
GLUCOSE BLD-MCNC: 152 MG/DL (ref 75–110)
GLUCOSE BLD-MCNC: 161 MG/DL (ref 75–110)
GLUCOSE BLD-MCNC: 165 MG/DL (ref 75–110)
GLUCOSE SERPL-MCNC: 120 MG/DL (ref 70–99)
HCT VFR BLD AUTO: 23 % (ref 40.7–50.3)
HCT VFR BLD AUTO: 26.5 % (ref 40.7–50.3)
HGB BLD-MCNC: 6.7 G/DL (ref 13–17)
HGB BLD-MCNC: 8 G/DL (ref 13–17)
IMM GRANULOCYTES # BLD AUTO: 0.28 K/UL (ref 0–0.3)
IMM GRANULOCYTES NFR BLD: 2 %
LYMPHOCYTES NFR BLD: 0.83 K/UL (ref 1.1–3.7)
LYMPHOCYTES RELATIVE PERCENT: 6 % (ref 24–43)
MAGNESIUM SERPL-MCNC: 2.2 MG/DL (ref 1.6–2.6)
MCH RBC QN AUTO: 29.6 PG (ref 25.2–33.5)
MCHC RBC AUTO-ENTMCNC: 29.1 G/DL (ref 28.4–34.8)
MCV RBC AUTO: 101.8 FL (ref 82.6–102.9)
MONOCYTES NFR BLD: 0.97 K/UL (ref 0.1–1.2)
MONOCYTES NFR BLD: 7 % (ref 3–12)
MORPHOLOGY: ABNORMAL
NEUTROPHILS NFR BLD: 84 % (ref 36–65)
NEUTS SEG NFR BLD: 11.58 K/UL (ref 1.5–8.1)
NRBC BLD-RTO: 0 PER 100 WBC
PHOSPHATE SERPL-MCNC: 4.6 MG/DL (ref 2.5–4.5)
PLATELET # BLD AUTO: 119 K/UL (ref 138–453)
PMV BLD AUTO: 9.8 FL (ref 8.1–13.5)
POTASSIUM SERPL-SCNC: 3.8 MMOL/L (ref 3.7–5.3)
PROCALCITONIN SERPL-MCNC: 0.71 NG/ML
PROT SERPL-MCNC: 5.5 G/DL (ref 6.4–8.3)
RBC # BLD AUTO: 2.26 M/UL (ref 4.21–5.77)
SODIUM SERPL-SCNC: 133 MMOL/L (ref 135–144)
WBC OTHER # BLD: 13.8 K/UL (ref 3.5–11.3)

## 2024-01-29 PROCEDURE — 99232 SBSQ HOSP IP/OBS MODERATE 35: CPT | Performed by: INTERNAL MEDICINE

## 2024-01-29 PROCEDURE — 6370000000 HC RX 637 (ALT 250 FOR IP): Performed by: STUDENT IN AN ORGANIZED HEALTH CARE EDUCATION/TRAINING PROGRAM

## 2024-01-29 PROCEDURE — 83735 ASSAY OF MAGNESIUM: CPT

## 2024-01-29 PROCEDURE — 6360000002 HC RX W HCPCS: Performed by: INTERNAL MEDICINE

## 2024-01-29 PROCEDURE — 94003 VENT MGMT INPAT SUBQ DAY: CPT

## 2024-01-29 PROCEDURE — 6360000002 HC RX W HCPCS: Performed by: STUDENT IN AN ORGANIZED HEALTH CARE EDUCATION/TRAINING PROGRAM

## 2024-01-29 PROCEDURE — 80076 HEPATIC FUNCTION PANEL: CPT

## 2024-01-29 PROCEDURE — 2060000000 HC ICU INTERMEDIATE R&B

## 2024-01-29 PROCEDURE — 36430 TRANSFUSION BLD/BLD COMPNT: CPT

## 2024-01-29 PROCEDURE — 2580000003 HC RX 258: Performed by: INTERNAL MEDICINE

## 2024-01-29 PROCEDURE — 94761 N-INVAS EAR/PLS OXIMETRY MLT: CPT

## 2024-01-29 PROCEDURE — 80048 BASIC METABOLIC PNL TOTAL CA: CPT

## 2024-01-29 PROCEDURE — 84100 ASSAY OF PHOSPHORUS: CPT

## 2024-01-29 PROCEDURE — 82947 ASSAY GLUCOSE BLOOD QUANT: CPT

## 2024-01-29 PROCEDURE — 36415 COLL VENOUS BLD VENIPUNCTURE: CPT

## 2024-01-29 PROCEDURE — 51798 US URINE CAPACITY MEASURE: CPT

## 2024-01-29 PROCEDURE — 85014 HEMATOCRIT: CPT

## 2024-01-29 PROCEDURE — P9016 RBC LEUKOCYTES REDUCED: HCPCS

## 2024-01-29 PROCEDURE — 2700000000 HC OXYGEN THERAPY PER DAY

## 2024-01-29 PROCEDURE — 85018 HEMOGLOBIN: CPT

## 2024-01-29 PROCEDURE — 99024 POSTOP FOLLOW-UP VISIT: CPT | Performed by: SURGERY

## 2024-01-29 PROCEDURE — 85025 COMPLETE CBC W/AUTO DIFF WBC: CPT

## 2024-01-29 PROCEDURE — 82330 ASSAY OF CALCIUM: CPT

## 2024-01-29 PROCEDURE — 84145 PROCALCITONIN (PCT): CPT

## 2024-01-29 RX ORDER — SODIUM CHLORIDE 9 MG/ML
INJECTION, SOLUTION INTRAVENOUS PRN
Status: DISCONTINUED | OUTPATIENT
Start: 2024-01-29 | End: 2024-01-31 | Stop reason: HOSPADM

## 2024-01-29 RX ADMIN — OXYCODONE HYDROCHLORIDE 10 MG: 5 TABLET ORAL at 22:40

## 2024-01-29 RX ADMIN — HYDROMORPHONE HYDROCHLORIDE 0.5 MG: 1 INJECTION, SOLUTION INTRAMUSCULAR; INTRAVENOUS; SUBCUTANEOUS at 14:32

## 2024-01-29 RX ADMIN — HYDROMORPHONE HYDROCHLORIDE 0.5 MG: 1 INJECTION, SOLUTION INTRAMUSCULAR; INTRAVENOUS; SUBCUTANEOUS at 18:37

## 2024-01-29 RX ADMIN — OXYCODONE HYDROCHLORIDE 10 MG: 5 TABLET ORAL at 16:23

## 2024-01-29 RX ADMIN — OXYCODONE HYDROCHLORIDE 10 MG: 5 TABLET ORAL at 04:04

## 2024-01-29 RX ADMIN — AMIODARONE HYDROCHLORIDE 200 MG: 200 TABLET ORAL at 08:36

## 2024-01-29 RX ADMIN — LANSOPRAZOLE 30 MG: 30 TABLET, ORALLY DISINTEGRATING, DELAYED RELEASE ORAL at 05:31

## 2024-01-29 RX ADMIN — HYDROMORPHONE HYDROCHLORIDE 0.5 MG: 1 INJECTION, SOLUTION INTRAMUSCULAR; INTRAVENOUS; SUBCUTANEOUS at 01:41

## 2024-01-29 RX ADMIN — SENNOSIDES 8.8 MG: 8.8 LIQUID ORAL at 20:25

## 2024-01-29 RX ADMIN — CEFTOLOZANE AND TAZOBACTAM 1500 MG: 1; .5 INJECTION, POWDER, LYOPHILIZED, FOR SOLUTION INTRAVENOUS at 12:23

## 2024-01-29 RX ADMIN — LANSOPRAZOLE 30 MG: 30 TABLET, ORALLY DISINTEGRATING, DELAYED RELEASE ORAL at 16:25

## 2024-01-29 RX ADMIN — MIDODRINE HYDROCHLORIDE 5 MG: 5 TABLET ORAL at 16:24

## 2024-01-29 RX ADMIN — CITALOPRAM 30 MG: 20 TABLET, FILM COATED ORAL at 08:29

## 2024-01-29 RX ADMIN — LEVOTHYROXINE SODIUM 200 MCG: 100 TABLET ORAL at 05:31

## 2024-01-29 RX ADMIN — ASPIRIN 81 MG 81 MG: 81 TABLET ORAL at 08:27

## 2024-01-29 RX ADMIN — MIDODRINE HYDROCHLORIDE 5 MG: 5 TABLET ORAL at 11:23

## 2024-01-29 RX ADMIN — CEFTOLOZANE AND TAZOBACTAM 1500 MG: 1; .5 INJECTION, POWDER, LYOPHILIZED, FOR SOLUTION INTRAVENOUS at 20:36

## 2024-01-29 RX ADMIN — HEPARIN SODIUM 7500 UNITS: 5000 INJECTION INTRAVENOUS; SUBCUTANEOUS at 20:25

## 2024-01-29 RX ADMIN — OXYCODONE HYDROCHLORIDE 10 MG: 5 TABLET ORAL at 10:21

## 2024-01-29 RX ADMIN — DOCUSATE SODIUM LIQUID 100 MG: 100 LIQUID ORAL at 20:25

## 2024-01-29 RX ADMIN — MIDODRINE HYDROCHLORIDE 5 MG: 5 TABLET ORAL at 08:27

## 2024-01-29 RX ADMIN — CEFTOLOZANE AND TAZOBACTAM 1500 MG: 1; .5 INJECTION, POWDER, LYOPHILIZED, FOR SOLUTION INTRAVENOUS at 05:30

## 2024-01-29 ASSESSMENT — PULMONARY FUNCTION TESTS
PIF_VALUE: 23
PIF_VALUE: 15
PIF_VALUE: 22
PIF_VALUE: 17
PIF_VALUE: 24
PIF_VALUE: 14
PIF_VALUE: 16
PIF_VALUE: 19
PIF_VALUE: 15

## 2024-01-29 ASSESSMENT — PAIN DESCRIPTION - LOCATION
LOCATION: ABDOMEN

## 2024-01-29 ASSESSMENT — PAIN DESCRIPTION - DESCRIPTORS
DESCRIPTORS: SQUEEZING;THROBBING
DESCRIPTORS: SHARP;SHOOTING;ACHING
DESCRIPTORS: THROBBING
DESCRIPTORS: THROBBING
DESCRIPTORS: DISCOMFORT;THROBBING
DESCRIPTORS: THROBBING

## 2024-01-29 ASSESSMENT — PAIN SCALES - GENERAL
PAINLEVEL_OUTOF10: 7
PAINLEVEL_OUTOF10: 8
PAINLEVEL_OUTOF10: 7
PAINLEVEL_OUTOF10: 5
PAINLEVEL_OUTOF10: 7
PAINLEVEL_OUTOF10: 10
PAINLEVEL_OUTOF10: 8
PAINLEVEL_OUTOF10: 4
PAINLEVEL_OUTOF10: 7

## 2024-01-29 ASSESSMENT — PAIN DESCRIPTION - ONSET: ONSET: ON-GOING

## 2024-01-29 ASSESSMENT — PAIN - FUNCTIONAL ASSESSMENT: PAIN_FUNCTIONAL_ASSESSMENT: PREVENTS OR INTERFERES SOME ACTIVE ACTIVITIES AND ADLS

## 2024-01-29 ASSESSMENT — PAIN DESCRIPTION - PAIN TYPE: TYPE: SURGICAL PAIN

## 2024-01-29 ASSESSMENT — PAIN DESCRIPTION - ORIENTATION: ORIENTATION: MID

## 2024-01-29 ASSESSMENT — PAIN DESCRIPTION - FREQUENCY: FREQUENCY: CONTINUOUS

## 2024-01-29 NOTE — PROGRESS NOTES
PROGRESS NOTE          PATIENT NAME: Ruben Dimas  MEDICAL RECORD NO. [id number]  DATE: 1/29/2024  SURGEON: Jake  PRIMARY CARE PHYSICIAN: Ramy Lazo MD    HD: # 28    ASSESSMENT    Patient Active Problem List   Diagnosis    Alcoholic cirrhosis of liver (HCC), sober since 2013    Peripheral edema    Bipolar disorder (HCC)    Type 2 diabetes mellitus with diabetic neuropathy, with long-term current use of insulin (HCC)    Essential hypertension, currently hypotensive    Dyslipidemia    Weakness    Acute metabolic encephalopathy    FABI (obstructive sleep apnea)    Primary osteoarthritis of right knee    Type 2 diabetes mellitus with diabetic neuropathy (HCC)    Acquired hypothyroidism    Urine retention    Anemia    Slow transit constipation    Iron deficiency anemia due to chronic blood loss    Gastroesophageal reflux disease without esophagitis    LEONARDA (acute kidney injury) (HCC)    Secondary esophageal varices without bleeding (HCC)    Portal hypertension (HCC)    Obesity, morbid, BMI 50 or higher (HCC)    Venous stasis dermatitis of both lower extremities    Cellulitis of perineum    Chronic indwelling Clemons catheter    Anxiety and depression    Back pain, chronic    BPH (benign prostatic hyperplasia)    Chronic diastolic CHF (congestive heart failure) (HCC)    Cirrhosis (HCC)    Diabetes mellitus (HCC)    GERD (gastroesophageal reflux disease)    Hepatitis    Hiatal hernia    Hypertension, essential    IBS (irritable bowel syndrome)    Morbid obesity with BMI of 45.0-49.9, adult (HCC)    Neuropathy    Chronic respiratory failure with hypoxia, on home oxygen therapy (HCC)    Sleep apnea    Thyroid disease    Urinary bladder neurogenic dysfunction    Acute UTI    Musculoskeletal immobility    Pyocystis    Myoclonic jerking    Thrombocytopenia (HCC)    Hypotension    Sepsis with acute hypercapnic respiratory failure and septic shock (HCC)    Right lower lobe pneumonia    Acute kidney injury superimposed on

## 2024-01-29 NOTE — CARE COORDINATION
Transitional planning    Writer placed call to [facility] for updated, plan is OR for another washout, [facility].

## 2024-01-29 NOTE — PROGRESS NOTES
[facility] -   Infectious diseases evaluation  admission date 1/1/2024    reason for consultation:   Necrotizing Faciitis    Impression :   Current:  1/1/24 Necrotizing faciitis 2/2 Polymicrobial infection with T2DM and PEG tube dislodgement and infected   LEONARDA on CKD   1/2/24 S/p partial wedge gastrectomy due to infected PEG tube and dislodgement   1/2/24 S/p debridement of abdomen and indwelling mesh removal,  due to concern for necrotizing fasciitis, wound vac in place  Cx abd wall 1/5 kleb x 2 and enterococcus and saccharomyces   1/3/24 GASTROSTOMY TUBE PLACEMENT, REPAIR OF LARGE VENTRAL DEFECT   Alcoholic Liver cirrhosis - found intra op  Elevated CRP  Lactic acidosis- initial 4.1 now 6.2  Bandemia leukemoid reaction- WBC 15 to 38 to 27 -45  Proteus UTI - treated  RLL pseudomonas pneumonia 1/1 - treated  Iv phlebitis -iv removed   1/11 R lung collapse - sp again pseudomonas R meropenem    Other:    Discussion / summary of stay / plan of care   Respiratory culture: pseudomonas multiS 1/1/24 -treated  U cx proteus multiS - treated  Bcx pending - SCN x 1  is a contamination  MRSA swab and COVID pending   cx of the abd wall  of 1/5/24 enterococcus and kleb and saccharomyces  Leukemoid reaction better 16 --27 fluctuating  Zyvox stopped 1/6 due to thrombocytopenia  Avoid fluconazole due to amiodarone  1/7 CT abd suggesting bilat LL atelectasis rather than  pneumonia   micafungin  1/9/24 - stop 1/13  Given due to persistent bandemia,  cover the fungus  1/11 R arm iv phlebitis - added doxy- stop 1/14  RLL pseudomonas pneumonia 1/1 - treated  Iv phlebitis -iv removed   1/11 R lung collapse - sp again pseudomonas but meropenem R -    Recommendations   Treating   large abd wound infection w kleb enterococcus and saccharomyces  Bandemia persistent but ultimately improved  Iv phlebitis pulled and resolved  R lung collapse 1/11 w mucous plug, sp pseudomonas  1/15       1/13 WBC  Q4H    amiodarone  200 mg Oral Daily    heparin (porcine)  7,500 Units SubCUTAneous 3 times per day    lansoprazole  30 mg Oral BID AC    docusate  100 mg Oral BID    senna  5 mL Oral BID    aspirin  81 mg Per NG tube Daily    levothyroxine  200 mcg Per G Tube Daily       Social History:     Social History     Socioeconomic History    Marital status:      Spouse name: Not on file    Number of children: Not on file    Years of education: Not on file    Highest education level: Not on file   Occupational History    Not on file   Tobacco Use    Smoking status: Never    Smokeless tobacco: Never   Vaping Use    Vaping Use: Never used   Substance and Sexual Activity    Alcohol use: No     Comment: quit drinking 2012    Drug use: No    Sexual activity: Not on file   Other Topics Concern    Not on file   Social History Narrative    Not on file     Social Determinants of Health     Financial Resource Strain: Not on file   Food Insecurity: Not on file   Transportation Needs: Not on file   Physical Activity: Not on file   Stress: Not on file   Social Connections: Not on file   Intimate Partner Violence: Not on file   Housing Stability: Not on file       Family History:     Family History   Adopted: Yes   Problem Relation Age of Onset    No Known Problems Mother         Adopted    No Known Problems Father         Adopted      Medical Decision Making:   I have independently reviewed/ordered the following labs:    CBC with Differential:   Recent Labs     01/28/24  0805 01/29/24  0649   WBC 23.0* 13.8*   HGB 8.2* 6.7*   HCT 26.1* 23.0*   * 119*   LYMPHOPCT 4* PENDING   MONOPCT 10* PENDING       BMP:  Recent Labs     01/28/24  0805 01/29/24  0649   * 133*   K 4.3 3.8    106   CO2 16* 14*   BUN 40* 39*   CREATININE 1.2 1.3*   MG 2.1 2.2       Hepatic Function Panel:   Recent Labs     01/29/24  0649   PROT 5.5*   LABALBU 2.1*   BILIDIR 0.5*   IBILI 0.2   BILITOT 0.7   ALKPHOS 432*   ALT 14   AST 14       No

## 2024-01-29 NOTE — PROGRESS NOTES
Occupational Therapy    [facility]  Occupational Therapy Not Seen Note    DATE: 2024    NAME: Ruben Dimas  MRN: [id number]   : 1965      Patient not seen this date for Occupational Therapy due to:        Pt with low Hgb,  6.7. Pt receiving blood at this time. Will continue as able.    Electronically signed by JENNY Blackmon on 2024 at 8:23 AM

## 2024-01-29 NOTE — PLAN OF CARE
Problem: Discharge Planning  Goal: Discharge to home or other facility with appropriate resources  1/29/2024 1440 by Schoenberger, Zachary, RN  Outcome: Progressing  Flowsheets (Taken 1/28/2024 2000 by Edyta Edge, RN)  Discharge to home or other facility with appropriate resources:   Identify barriers to discharge with patient and caregiver   Arrange for needed discharge resources and transportation as appropriate   Identify discharge learning needs (meds, wound care, etc)   Refer to discharge planning if patient needs post-hospital services based on physician order or complex needs related to functional status, cognitive ability or social support system  1/29/2024 0523 by Edyta Edge, RN  Outcome: Progressing  Flowsheets (Taken 1/28/2024 2000)  Discharge to home or other facility with appropriate resources:   Identify barriers to discharge with patient and caregiver   Arrange for needed discharge resources and transportation as appropriate   Identify discharge learning needs (meds, wound care, etc)   Refer to discharge planning if patient needs post-hospital services based on physician order or complex needs related to functional status, cognitive ability or social support system     Problem: Pain  Goal: Verbalizes/displays adequate comfort level or baseline comfort level  1/29/2024 1440 by Schoenberger, Zachary, RN  Outcome: Progressing  Flowsheets (Taken 1/25/2024 1838 by Fabiola Jhaveri, RN)  Verbalizes/displays adequate comfort level or baseline comfort level:   Assess pain using appropriate pain scale   Encourage patient to monitor pain and request assistance   Administer analgesics based on type and severity of pain and evaluate response   Implement non-pharmacological measures as appropriate and evaluate response   Consider cultural and social influences on pain and pain management   Notify Licensed Independent Practitioner if interventions unsuccessful or patient reports new pain  1/29/2024

## 2024-01-29 NOTE — PLAN OF CARE
Done w the zerbaxa course fr pseudomonads pneumonia  Bandemia last 3 days, worse today-  No fever -  No recent cx  Clemons 14 days old  Went for abd xenmatrix mesh and prep 1/28 - no comments of abd purulence    Plan:  Get UA - CXR look for new lobar collapse -procal -  Sp cx    Resume zerbaxa in the meantime pend results    Lurdes Muller MD. Infectious Diseases

## 2024-01-29 NOTE — PROGRESS NOTES
Comprehensive Nutrition Assessment    Type and Reason for Visit:  Reassess    Nutrition Recommendations/Plan:   Continue current diet per SLP, Minced and moist  Modify ONS to BID  Suggest supplemental TF to meet nutrition needs for healing, Immune-enhancing at 60 mL/hr x 10 hrs. Provides an additional 900 kcal, 56 g PRO, 450 mL free water.  Monitor PO intake, TF tolerance.     Malnutrition Assessment:  Malnutrition Status:  Insufficient data (01/03/24 1416)    Context:  Acute Illness     Findings of the 6 clinical characteristics of malnutrition:  Energy Intake:  Mild decrease in energy intake (Comment)  Weight Loss:  7.5% over 3 months     Body Fat Loss:  Unable to assess     Muscle Mass Loss:  Unable to assess    Fluid Accumulation:  Mild (to moderate) Extremities, Generalized   Strength:  Not Performed    Nutrition Assessment:    Noted plan for wound vac change (1/31). Pt +trach, +PEG. d/w RN, pt with minimal PO intake at breakfast (only ate yogurt and ~50% of Ensure). RN states pt drank Ensure last night, 100%. Discussed adding nocturnal TF until PO intake increases with CAROLA Curiel, who was agreeable to restart of TF. LBM 1/28. +2 generalized/extremity edema noted. If PO intake increases, will d/c TF and add Manpreet.    Nutrition Related Findings:    labs/meds reviewed Wound Type: Multiple, Skin Tears, Moisture Associate Skin Damage, Surgical Incision, Wound Vac       Current Nutrition Intake & Therapies:    Average Meal Intake: 26-50%, 1-25%  Average Supplements Intake: 26-50%, 51-75%  ADULT DIET; Dysphagia - Minced and Moist  ADULT ORAL NUTRITION SUPPLEMENT; Breakfast, Lunch, Dinner; Standard High Calorie/High Protein Oral Supplement    Anthropometric Measures:  Height: 177.8 cm (5' 10\")  Ideal Body Weight (IBW): 166 lbs (75 kg)    Admission Body Weight: 156.9 kg (345 lb 14.4 oz)  Current Body Weight: 157.3 kg (346 lb 12.5 oz) (1/23/24), 208.9 % IBW. Weight Source: Bed Scale  Current BMI (kg/m2):

## 2024-01-29 NOTE — PROGRESS NOTES
01/29/24 0725   Care Plan - Respiratory Goals   Achieves optimal ventilation and oxygenation Respiratory therapy support as indicated;Assess and instruct to report shortness of breath or any respiratory difficulty;Assess the need for suctioning and aspirate as needed;Oxygen supplementation based on oxygen saturation or arterial blood gases;Position to facilitate oxygenation and minimize respiratory effort;Assess for changes in mentation and behavior;Assess for changes in respiratory status

## 2024-01-29 NOTE — PROGRESS NOTES
Physical Therapy        Physical Therapy Cancel Note      DATE: 2024    NAME: Ruben Dimas  MRN: [id number]   : 1965      Patient not seen this date for Physical Therapy due to:    Patient is not appropriate for PT evaluation/treatment at this time d/t hgb 6.7 this AM. Pt getting blood soon per RN. Will await completion of blood transfusion per protocol. Ck in PM if able, or        Electronically signed by Liza Stark PT on 2024 at 8:20 AM

## 2024-01-29 NOTE — PLAN OF CARE
Problem: Pain  Goal: Verbalizes/displays adequate comfort level or baseline comfort level  Outcome: Progressing     Problem: Respiratory - Adult  Goal: Achieves optimal ventilation and oxygenation  Outcome: Progressing  Flowsheets (Taken 1/28/2024 2000)  Achieves optimal ventilation and oxygenation:   Assess for changes in respiratory status   Assess for changes in mentation and behavior   Position to facilitate oxygenation and minimize respiratory effort   Oxygen supplementation based on oxygen saturation or arterial blood gases   Respiratory therapy support as indicated     Problem: Safety - Adult  Goal: Free from fall injury  Outcome: Progressing  Flowsheets (Taken 1/28/2024 2000)  Free From Fall Injury: Instruct family/caregiver on patient safety     Problem: Chronic Conditions and Co-morbidities  Goal: Patient's chronic conditions and co-morbidity symptoms are monitored and maintained or improved  Outcome: Progressing  Flowsheets (Taken 1/28/2024 2000)  Care Plan - Patient's Chronic Conditions and Co-Morbidity Symptoms are Monitored and Maintained or Improved:   Monitor and assess patient's chronic conditions and comorbid symptoms for stability, deterioration, or improvement   Collaborate with multidisciplinary team to address chronic and comorbid conditions and prevent exacerbation or deterioration   Update acute care plan with appropriate goals if chronic or comorbid symptoms are exacerbated and prevent overall improvement and discharge     Problem: Skin/Tissue Integrity  Goal: Absence of new skin breakdown  Description: 1.  Monitor for areas of redness and/or skin breakdown  2.  Assess vascular access sites hourly  3.  Every 4-6 hours minimum:  Change oxygen saturation probe site  4.  Every 4-6 hours:  If on nasal continuous positive airway pressure, respiratory therapy assess nares and determine need for appliance change or resting period.  Outcome: Progressing     Problem: Nutrition Deficit:  Goal:

## 2024-01-30 LAB
ABO/RH: NORMAL
ANION GAP SERPL CALCULATED.3IONS-SCNC: 11 MMOL/L (ref 9–17)
ANTIBODY SCREEN: NEGATIVE
ARM BAND NUMBER: NORMAL
BASOPHILS # BLD: 0 K/UL (ref 0–0.2)
BASOPHILS NFR BLD: 0 % (ref 0–2)
BLOOD BANK BLOOD PRODUCT EXPIRATION DATE: NORMAL
BLOOD BANK BLOOD PRODUCT EXPIRATION DATE: NORMAL
BLOOD BANK DISPENSE STATUS: NORMAL
BLOOD BANK DISPENSE STATUS: NORMAL
BLOOD BANK ISBT PRODUCT BLOOD TYPE: 6200
BLOOD BANK ISBT PRODUCT BLOOD TYPE: 6200
BLOOD BANK PRODUCT CODE: NORMAL
BLOOD BANK PRODUCT CODE: NORMAL
BLOOD BANK SAMPLE EXPIRATION: NORMAL
BLOOD BANK UNIT TYPE AND RH: NORMAL
BLOOD BANK UNIT TYPE AND RH: NORMAL
BPU ID: NORMAL
BPU ID: NORMAL
BUN SERPL-MCNC: 36 MG/DL (ref 6–20)
CA-I BLD-SCNC: 1.22 MMOL/L (ref 1.13–1.33)
CALCIUM SERPL-MCNC: 7.8 MG/DL (ref 8.6–10.4)
CHLORIDE SERPL-SCNC: 104 MMOL/L (ref 98–107)
CO2 SERPL-SCNC: 14 MMOL/L (ref 20–31)
COMPONENT: NORMAL
COMPONENT: NORMAL
CREAT SERPL-MCNC: 1.3 MG/DL (ref 0.7–1.2)
CROSSMATCH RESULT: NORMAL
CROSSMATCH RESULT: NORMAL
EOSINOPHIL # BLD: 0.25 K/UL (ref 0–0.44)
EOSINOPHILS RELATIVE PERCENT: 2 % (ref 1–4)
ERYTHROCYTE [DISTWIDTH] IN BLOOD BY AUTOMATED COUNT: 22.3 % (ref 11.8–14.4)
GFR SERPL CREATININE-BSD FRML MDRD: >60 ML/MIN/1.73M2
GLUCOSE BLD-MCNC: 106 MG/DL (ref 75–110)
GLUCOSE BLD-MCNC: 113 MG/DL (ref 75–110)
GLUCOSE BLD-MCNC: 114 MG/DL (ref 75–110)
GLUCOSE BLD-MCNC: 167 MG/DL (ref 75–110)
GLUCOSE BLD-MCNC: 192 MG/DL (ref 75–110)
GLUCOSE BLD-MCNC: 224 MG/DL (ref 75–110)
GLUCOSE SERPL-MCNC: 214 MG/DL (ref 70–99)
HCT VFR BLD AUTO: 22.6 % (ref 40.7–50.3)
HGB BLD-MCNC: 7.4 G/DL (ref 13–17)
IMM GRANULOCYTES # BLD AUTO: 0.25 K/UL (ref 0–0.3)
IMM GRANULOCYTES NFR BLD: 2 %
LYMPHOCYTES NFR BLD: 0.76 K/UL (ref 1.1–3.7)
LYMPHOCYTES RELATIVE PERCENT: 6 % (ref 24–43)
MAGNESIUM SERPL-MCNC: 1.9 MG/DL (ref 1.6–2.6)
MCH RBC QN AUTO: 31.1 PG (ref 25.2–33.5)
MCHC RBC AUTO-ENTMCNC: 32.7 G/DL (ref 28.4–34.8)
MCV RBC AUTO: 95 FL (ref 82.6–102.9)
MONOCYTES NFR BLD: 1.02 K/UL (ref 0.1–1.2)
MONOCYTES NFR BLD: 8 % (ref 3–12)
MORPHOLOGY: ABNORMAL
NEUTROPHILS NFR BLD: 82 % (ref 36–65)
NEUTS SEG NFR BLD: 10.42 K/UL (ref 1.5–8.1)
NRBC BLD-RTO: 0 PER 100 WBC
PHOSPHATE SERPL-MCNC: 3.5 MG/DL (ref 2.5–4.5)
PLATELET # BLD AUTO: 89 K/UL (ref 138–453)
PMV BLD AUTO: 11.3 FL (ref 8.1–13.5)
POTASSIUM SERPL-SCNC: 3.7 MMOL/L (ref 3.7–5.3)
RBC # BLD AUTO: 2.38 M/UL (ref 4.21–5.77)
SODIUM SERPL-SCNC: 129 MMOL/L (ref 135–144)
TRANSFUSION STATUS: NORMAL
TRANSFUSION STATUS: NORMAL
UNIT DIVISION: 0
UNIT DIVISION: 0
UNIT ISSUE DATE/TIME: NORMAL
UNIT ISSUE DATE/TIME: NORMAL
WBC OTHER # BLD: 12.7 K/UL (ref 3.5–11.3)

## 2024-01-30 PROCEDURE — 2580000003 HC RX 258: Performed by: STUDENT IN AN ORGANIZED HEALTH CARE EDUCATION/TRAINING PROGRAM

## 2024-01-30 PROCEDURE — 36415 COLL VENOUS BLD VENIPUNCTURE: CPT

## 2024-01-30 PROCEDURE — 94003 VENT MGMT INPAT SUBQ DAY: CPT

## 2024-01-30 PROCEDURE — 82947 ASSAY GLUCOSE BLOOD QUANT: CPT

## 2024-01-30 PROCEDURE — 6370000000 HC RX 637 (ALT 250 FOR IP): Performed by: STUDENT IN AN ORGANIZED HEALTH CARE EDUCATION/TRAINING PROGRAM

## 2024-01-30 PROCEDURE — 2060000000 HC ICU INTERMEDIATE R&B

## 2024-01-30 PROCEDURE — 94761 N-INVAS EAR/PLS OXIMETRY MLT: CPT

## 2024-01-30 PROCEDURE — 80048 BASIC METABOLIC PNL TOTAL CA: CPT

## 2024-01-30 PROCEDURE — 99232 SBSQ HOSP IP/OBS MODERATE 35: CPT | Performed by: INTERNAL MEDICINE

## 2024-01-30 PROCEDURE — 6360000002 HC RX W HCPCS: Performed by: STUDENT IN AN ORGANIZED HEALTH CARE EDUCATION/TRAINING PROGRAM

## 2024-01-30 PROCEDURE — 82330 ASSAY OF CALCIUM: CPT

## 2024-01-30 PROCEDURE — 6360000002 HC RX W HCPCS: Performed by: INTERNAL MEDICINE

## 2024-01-30 PROCEDURE — 85025 COMPLETE CBC W/AUTO DIFF WBC: CPT

## 2024-01-30 PROCEDURE — 83735 ASSAY OF MAGNESIUM: CPT

## 2024-01-30 PROCEDURE — 2700000000 HC OXYGEN THERAPY PER DAY

## 2024-01-30 PROCEDURE — 84100 ASSAY OF PHOSPHORUS: CPT

## 2024-01-30 PROCEDURE — 2580000003 HC RX 258: Performed by: INTERNAL MEDICINE

## 2024-01-30 RX ADMIN — AMIODARONE HYDROCHLORIDE 200 MG: 200 TABLET ORAL at 07:55

## 2024-01-30 RX ADMIN — OXYCODONE HYDROCHLORIDE 10 MG: 5 TABLET ORAL at 21:57

## 2024-01-30 RX ADMIN — CEFTOLOZANE AND TAZOBACTAM 1500 MG: 1; .5 INJECTION, POWDER, LYOPHILIZED, FOR SOLUTION INTRAVENOUS at 21:44

## 2024-01-30 RX ADMIN — DOCUSATE SODIUM LIQUID 100 MG: 100 LIQUID ORAL at 07:55

## 2024-01-30 RX ADMIN — OXYCODONE HYDROCHLORIDE 10 MG: 5 TABLET ORAL at 15:35

## 2024-01-30 RX ADMIN — HYDROMORPHONE HYDROCHLORIDE 0.5 MG: 1 INJECTION, SOLUTION INTRAMUSCULAR; INTRAVENOUS; SUBCUTANEOUS at 01:48

## 2024-01-30 RX ADMIN — CEFTOLOZANE AND TAZOBACTAM 1500 MG: 1; .5 INJECTION, POWDER, LYOPHILIZED, FOR SOLUTION INTRAVENOUS at 06:09

## 2024-01-30 RX ADMIN — OXYCODONE HYDROCHLORIDE 10 MG: 5 TABLET ORAL at 04:39

## 2024-01-30 RX ADMIN — CEFTOLOZANE AND TAZOBACTAM 1500 MG: 1; .5 INJECTION, POWDER, LYOPHILIZED, FOR SOLUTION INTRAVENOUS at 13:29

## 2024-01-30 RX ADMIN — HEPARIN SODIUM 7500 UNITS: 5000 INJECTION INTRAVENOUS; SUBCUTANEOUS at 21:44

## 2024-01-30 RX ADMIN — MIDODRINE HYDROCHLORIDE 5 MG: 5 TABLET ORAL at 18:54

## 2024-01-30 RX ADMIN — OXYCODONE HYDROCHLORIDE 10 MG: 5 TABLET ORAL at 10:25

## 2024-01-30 RX ADMIN — INSULIN LISPRO 8 UNITS: 100 INJECTION, SOLUTION INTRAVENOUS; SUBCUTANEOUS at 00:56

## 2024-01-30 RX ADMIN — INSULIN LISPRO 4 UNITS: 100 INJECTION, SOLUTION INTRAVENOUS; SUBCUTANEOUS at 07:56

## 2024-01-30 RX ADMIN — SODIUM CHLORIDE, PRESERVATIVE FREE 10 ML: 5 INJECTION INTRAVENOUS at 21:45

## 2024-01-30 RX ADMIN — HYDROMORPHONE HYDROCHLORIDE 0.5 MG: 1 INJECTION, SOLUTION INTRAMUSCULAR; INTRAVENOUS; SUBCUTANEOUS at 19:00

## 2024-01-30 RX ADMIN — MIDODRINE HYDROCHLORIDE 5 MG: 5 TABLET ORAL at 13:32

## 2024-01-30 RX ADMIN — HEPARIN SODIUM 7500 UNITS: 5000 INJECTION INTRAVENOUS; SUBCUTANEOUS at 13:32

## 2024-01-30 RX ADMIN — SENNOSIDES 8.8 MG: 8.8 LIQUID ORAL at 07:55

## 2024-01-30 RX ADMIN — ASPIRIN 81 MG 81 MG: 81 TABLET ORAL at 07:55

## 2024-01-30 RX ADMIN — DOCUSATE SODIUM LIQUID 100 MG: 100 LIQUID ORAL at 21:44

## 2024-01-30 RX ADMIN — MIDODRINE HYDROCHLORIDE 5 MG: 5 TABLET ORAL at 07:55

## 2024-01-30 RX ADMIN — LANSOPRAZOLE 30 MG: 30 TABLET, ORALLY DISINTEGRATING, DELAYED RELEASE ORAL at 18:56

## 2024-01-30 RX ADMIN — LANSOPRAZOLE 30 MG: 30 TABLET, ORALLY DISINTEGRATING, DELAYED RELEASE ORAL at 06:14

## 2024-01-30 RX ADMIN — HEPARIN SODIUM 7500 UNITS: 5000 INJECTION INTRAVENOUS; SUBCUTANEOUS at 05:39

## 2024-01-30 RX ADMIN — HYDROMORPHONE HYDROCHLORIDE 0.5 MG: 1 INJECTION, SOLUTION INTRAMUSCULAR; INTRAVENOUS; SUBCUTANEOUS at 07:55

## 2024-01-30 RX ADMIN — CITALOPRAM 30 MG: 20 TABLET, FILM COATED ORAL at 07:55

## 2024-01-30 RX ADMIN — LEVOTHYROXINE SODIUM 200 MCG: 100 TABLET ORAL at 06:14

## 2024-01-30 RX ADMIN — SENNOSIDES 8.8 MG: 8.8 LIQUID ORAL at 21:44

## 2024-01-30 RX ADMIN — HYDROMORPHONE HYDROCHLORIDE 0.5 MG: 1 INJECTION, SOLUTION INTRAMUSCULAR; INTRAVENOUS; SUBCUTANEOUS at 14:20

## 2024-01-30 ASSESSMENT — PAIN SCALES - GENERAL
PAINLEVEL_OUTOF10: 8
PAINLEVEL_OUTOF10: 10
PAINLEVEL_OUTOF10: 9
PAINLEVEL_OUTOF10: 7
PAINLEVEL_OUTOF10: 10
PAINLEVEL_OUTOF10: 9
PAINLEVEL_OUTOF10: 9
PAINLEVEL_OUTOF10: 7

## 2024-01-30 ASSESSMENT — PAIN DESCRIPTION - LOCATION
LOCATION: ABDOMEN

## 2024-01-30 ASSESSMENT — PULMONARY FUNCTION TESTS
PIF_VALUE: 21
PIF_VALUE: 22
PIF_VALUE: 16
PIF_VALUE: 20
PIF_VALUE: 16
PIF_VALUE: 17
PIF_VALUE: 16

## 2024-01-30 ASSESSMENT — PAIN DESCRIPTION - DESCRIPTORS
DESCRIPTORS: DISCOMFORT
DESCRIPTORS: DISCOMFORT
DESCRIPTORS: ACHING;DISCOMFORT
DESCRIPTORS: DISCOMFORT

## 2024-01-30 NOTE — PROGRESS NOTES
Writer in patient's room at this time and notify right abdominal dressing needs change due to drainage and change pads under patient. Patient refusing to allow writer to change dressing and continues to refuse to turns. Patient educated on the need for dry pads and dressing. Patient continues to refuse.

## 2024-01-30 NOTE — PLAN OF CARE
Problem: Respiratory - Adult  Goal: Achieves optimal ventilation and oxygenation  1/29/2024 1938 by Seema Ramirez RCP  Outcome: Progressing

## 2024-01-30 NOTE — PROGRESS NOTES
PROGRESS NOTE          PATIENT NAME: Ruben Dimas  MEDICAL RECORD NO. [id number]  DATE: 1/30/2024  SURGEON: Jake  PRIMARY CARE PHYSICIAN: Ramy Lazo MD    HD: # 29    ASSESSMENT    Patient Active Problem List   Diagnosis    Alcoholic cirrhosis of liver (HCC), sober since 2013    Peripheral edema    Bipolar disorder (HCC)    Type 2 diabetes mellitus with diabetic neuropathy, with long-term current use of insulin (HCC)    Essential hypertension, currently hypotensive    Dyslipidemia    Weakness    Acute metabolic encephalopathy    FABI (obstructive sleep apnea)    Primary osteoarthritis of right knee    Type 2 diabetes mellitus with diabetic neuropathy (HCC)    Acquired hypothyroidism    Urine retention    Anemia    Slow transit constipation    Iron deficiency anemia due to chronic blood loss    Gastroesophageal reflux disease without esophagitis    LEONARDA (acute kidney injury) (HCC)    Secondary esophageal varices without bleeding (HCC)    Portal hypertension (HCC)    Obesity, morbid, BMI 50 or higher (HCC)    Venous stasis dermatitis of both lower extremities    Cellulitis of perineum    Chronic indwelling Clemons catheter    Anxiety and depression    Back pain, chronic    BPH (benign prostatic hyperplasia)    Chronic diastolic CHF (congestive heart failure) (HCC)    Cirrhosis (HCC)    Diabetes mellitus (HCC)    GERD (gastroesophageal reflux disease)    Hepatitis    Hiatal hernia    Hypertension, essential    IBS (irritable bowel syndrome)    Morbid obesity with BMI of 45.0-49.9, adult (HCC)    Neuropathy    Chronic respiratory failure with hypoxia, on home oxygen therapy (HCC)    Sleep apnea    Thyroid disease    Urinary bladder neurogenic dysfunction    Acute UTI    Musculoskeletal immobility    Pyocystis    Myoclonic jerking    Thrombocytopenia (HCC)    Hypotension    Sepsis with acute hypercapnic respiratory failure and septic shock (HCC)    Right lower lobe pneumonia    Acute kidney injury superimposed on  hypothyroidism, Synthroid 200 mcg daily  MSK PT/OT for passive range of movement  Wound care: RLQ wound wet to dry dressing covered with ABD, changed twice daily or when saturated  Dispo: Vac change in OR 24 then possible discharge to LTAC    Chief Complaint: \"no complaints\"    SUBJECTIVE    Ruben Dimas was seen and evaluated bedside.  Afebrile, no acute events overnight. He has been refusing dressing and bedding changes per nurse. He is getting supplemental tube feeding in addition to his oral diet. His Hgb is 7.4 down from 8. He is scheduled for OR tomorrow for VAC change.     OBJECTIVE  VITALS: Temp: Temp: 97.8 °F (36.6 °C)Temp  Av.6 °F (36.4 °C)  Min: 96.8 °F (36 °C)  Max: 98.1 °F (36.7 °C) BP Systolic (24hrs), Av , Min:95 , Max:134   Diastolic (24hrs), Av, Min:41, Max:59   Pulse Pulse  Av.4  Min: 59  Max: 84 Resp Resp  Av.5  Min: 12  Max: 26 Pulse ox SpO2  Av %  Min: 100 %  Max: 100 %  GENERAL: alert, no distress  NEURO: interactive with exam, no distress  HEENT: normocephalic, trach in place, EMOI  : Clemons in place  LUNGS: Normal effort with respirations, tracheostomy tube in place  HEART: normal rate and regular rhythm  ABDOMEN: soft, distended, minimally TTP near edges of wound vac, black foam wound vac in place with good seal, RLQ wound with dressing, patient refusing to take down, tenderness to palpation, moderate amount of serosanguinous drainage saturating the dressing and sheet.  EXTREMITY: b/l LE edema unchanged, flaking of skin to bilateral lower extremities    I/O last 3 completed shifts:  In: 2822.7 [P.O.:1540; Blood:321.5; NG/GT:665; IV Piggyback:296.2]  Out: 3065 [Urine:2165; Drains:900]    Drain/tube output:  In: 2210.3 [P.O.:980; Blood:321.5; NG/GT:725]  Out: 2950 [Urine:2425; Drains:525]    LAB:  CBC:   Recent Labs     24  0805 24  0649 24  1142 24  0642   WBC 23.0* 13.8*  --  12.7*   HGB 8.2* 6.7* 8.0* 7.4*   HCT 26.1* 23.0* 26.5*

## 2024-01-30 NOTE — CARE COORDINATION
Plan for OR washout tomorrow at 1030 and transfer to [facility], transport per [facility] set up for 7pm 1/31. Danika with [facility] updated.

## 2024-01-30 NOTE — PLAN OF CARE

## 2024-01-30 NOTE — PLAN OF CARE
Problem: Respiratory - Adult  Goal: Achieves optimal ventilation and oxygenation  1/30/2024 0739 by Martha Crane RCP  Outcome: Progressing  1/29/2024 1938 by Seema Ramirez RCP  Outcome: Progressing

## 2024-01-31 ENCOUNTER — ANESTHESIA EVENT (OUTPATIENT)
Dept: OPERATING ROOM | Age: 59
DRG: 853 | End: 2024-01-31
Payer: MEDICARE

## 2024-01-31 ENCOUNTER — ANESTHESIA (OUTPATIENT)
Dept: OPERATING ROOM | Age: 59
DRG: 853 | End: 2024-01-31
Payer: MEDICARE

## 2024-01-31 ENCOUNTER — APPOINTMENT (OUTPATIENT)
Dept: GENERAL RADIOLOGY | Age: 59
DRG: 853 | End: 2024-01-31
Payer: MEDICARE

## 2024-01-31 ENCOUNTER — HOSPITAL ENCOUNTER (OUTPATIENT)
Dept: MEDSURG UNIT | Age: 59
Discharge: HOME | End: 2024-01-31
Attending: INTERNAL MEDICINE | Admitting: INTERNAL MEDICINE

## 2024-01-31 VITALS
BODY MASS INDEX: 45.1 KG/M2 | OXYGEN SATURATION: 100 % | RESPIRATION RATE: 16 BRPM | HEART RATE: 67 BPM | WEIGHT: 315 LBS | TEMPERATURE: 97.4 F | HEIGHT: 70 IN | DIASTOLIC BLOOD PRESSURE: 46 MMHG | SYSTOLIC BLOOD PRESSURE: 109 MMHG

## 2024-01-31 LAB
ANION GAP SERPL CALCULATED.3IONS-SCNC: 14 MMOL/L (ref 9–17)
BASOPHILS # BLD: 0.15 K/UL (ref 0–0.2)
BASOPHILS NFR BLD: 1 % (ref 0–2)
BUN SERPL-MCNC: 32 MG/DL (ref 6–20)
CALCIUM SERPL-MCNC: 8.1 MG/DL (ref 8.6–10.4)
CHLORIDE SERPL-SCNC: 111 MMOL/L (ref 98–107)
CO2 SERPL-SCNC: 12 MMOL/L (ref 20–31)
CREAT SERPL-MCNC: 1.2 MG/DL (ref 0.7–1.2)
EOSINOPHIL # BLD: 0 K/UL (ref 0–0.4)
EOSINOPHILS RELATIVE PERCENT: 0 % (ref 1–4)
ERYTHROCYTE [DISTWIDTH] IN BLOOD BY AUTOMATED COUNT: 22.3 % (ref 11.8–14.4)
GFR SERPL CREATININE-BSD FRML MDRD: >60 ML/MIN/1.73M2
GLUCOSE BLD-MCNC: 115 MG/DL (ref 75–110)
GLUCOSE BLD-MCNC: 121 MG/DL (ref 75–110)
GLUCOSE BLD-MCNC: 125 MG/DL (ref 75–110)
GLUCOSE BLD-MCNC: 128 MG/DL (ref 75–110)
GLUCOSE BLD-MCNC: 147 MG/DL (ref 75–110)
GLUCOSE SERPL-MCNC: 153 MG/DL (ref 70–99)
HCT VFR BLD AUTO: 24.7 % (ref 40.7–50.3)
HGB BLD-MCNC: 7.1 G/DL (ref 13–17)
IMM GRANULOCYTES # BLD AUTO: 0 K/UL (ref 0–0.3)
IMM GRANULOCYTES NFR BLD: 0 %
LYMPHOCYTES NFR BLD: 0.3 K/UL (ref 1–4.8)
LYMPHOCYTES RELATIVE PERCENT: 2 % (ref 24–44)
MCH RBC QN AUTO: 30 PG (ref 25.2–33.5)
MCHC RBC AUTO-ENTMCNC: 28.7 G/DL (ref 28.4–34.8)
MCV RBC AUTO: 104.2 FL (ref 82.6–102.9)
MONOCYTES NFR BLD: 0 % (ref 1–7)
MONOCYTES NFR BLD: 0 K/UL (ref 0.1–0.8)
MORPHOLOGY: ABNORMAL
MORPHOLOGY: ABNORMAL
NEUTROPHILS NFR BLD: 97 % (ref 36–66)
NEUTS SEG NFR BLD: 14.65 K/UL (ref 1.8–7.7)
NRBC BLD-RTO: 0 PER 100 WBC
PLATELET # BLD AUTO: 89 K/UL (ref 138–453)
PMV BLD AUTO: 9.9 FL (ref 8.1–13.5)
POTASSIUM SERPL-SCNC: 4.2 MMOL/L (ref 3.7–5.3)
RBC # BLD AUTO: 2.37 M/UL (ref 4.21–5.77)
SODIUM SERPL-SCNC: 137 MMOL/L (ref 135–144)
WBC OTHER # BLD: 15.1 K/UL (ref 3.5–11.3)

## 2024-01-31 PROCEDURE — 94761 N-INVAS EAR/PLS OXIMETRY MLT: CPT

## 2024-01-31 PROCEDURE — 99024 POSTOP FOLLOW-UP VISIT: CPT | Performed by: SURGERY

## 2024-01-31 PROCEDURE — 7100000000 HC PACU RECOVERY - FIRST 15 MIN: Performed by: SURGERY

## 2024-01-31 PROCEDURE — 3600000014 HC SURGERY LEVEL 4 ADDTL 15MIN: Performed by: SURGERY

## 2024-01-31 PROCEDURE — 80048 BASIC METABOLIC PNL TOTAL CA: CPT

## 2024-01-31 PROCEDURE — 6370000000 HC RX 637 (ALT 250 FOR IP): Performed by: STUDENT IN AN ORGANIZED HEALTH CARE EDUCATION/TRAINING PROGRAM

## 2024-01-31 PROCEDURE — 15003 WOUND PREP ADDL 100 CM: CPT | Performed by: SURGERY

## 2024-01-31 PROCEDURE — 15272 SKIN SUB GRAFT T/A/L ADD-ON: CPT | Performed by: SURGERY

## 2024-01-31 PROCEDURE — 82947 ASSAY GLUCOSE BLOOD QUANT: CPT

## 2024-01-31 PROCEDURE — 2580000003 HC RX 258: Performed by: INTERNAL MEDICINE

## 2024-01-31 PROCEDURE — 2700000000 HC OXYGEN THERAPY PER DAY

## 2024-01-31 PROCEDURE — 15274 SKN SUB GRFT T/A/L CHILD ADD: CPT | Performed by: SURGERY

## 2024-01-31 PROCEDURE — 97606 NEG PRS WND THER DME>50 SQCM: CPT | Performed by: SURGERY

## 2024-01-31 PROCEDURE — 15273 SKIN SUB GRFT T/ARM/LG CHILD: CPT | Performed by: SURGERY

## 2024-01-31 PROCEDURE — 71045 X-RAY EXAM CHEST 1 VIEW: CPT

## 2024-01-31 PROCEDURE — 86920 COMPATIBILITY TEST SPIN: CPT

## 2024-01-31 PROCEDURE — 6360000002 HC RX W HCPCS

## 2024-01-31 PROCEDURE — 2580000003 HC RX 258: Performed by: SURGERY

## 2024-01-31 PROCEDURE — 85025 COMPLETE CBC W/AUTO DIFF WBC: CPT

## 2024-01-31 PROCEDURE — 0WUF0KZ SUPPLEMENT ABDOMINAL WALL WITH NONAUTOLOGOUS TISSUE SUBSTITUTE, OPEN APPROACH: ICD-10-PCS | Performed by: SURGERY

## 2024-01-31 PROCEDURE — 6360000002 HC RX W HCPCS: Performed by: ANESTHESIOLOGY

## 2024-01-31 PROCEDURE — 6360000002 HC RX W HCPCS: Performed by: STUDENT IN AN ORGANIZED HEALTH CARE EDUCATION/TRAINING PROGRAM

## 2024-01-31 PROCEDURE — 86901 BLOOD TYPING SEROLOGIC RH(D): CPT

## 2024-01-31 PROCEDURE — 99232 SBSQ HOSP IP/OBS MODERATE 35: CPT | Performed by: INTERNAL MEDICINE

## 2024-01-31 PROCEDURE — 36415 COLL VENOUS BLD VENIPUNCTURE: CPT

## 2024-01-31 PROCEDURE — 2709999900 HC NON-CHARGEABLE SUPPLY: Performed by: SURGERY

## 2024-01-31 PROCEDURE — 7100000001 HC PACU RECOVERY - ADDTL 15 MIN: Performed by: SURGERY

## 2024-01-31 PROCEDURE — 2580000003 HC RX 258

## 2024-01-31 PROCEDURE — 6360000002 HC RX W HCPCS: Performed by: INTERNAL MEDICINE

## 2024-01-31 PROCEDURE — 2720000010 HC SURG SUPPLY STERILE: Performed by: SURGERY

## 2024-01-31 PROCEDURE — 2500000003 HC RX 250 WO HCPCS

## 2024-01-31 PROCEDURE — 94003 VENT MGMT INPAT SUBQ DAY: CPT

## 2024-01-31 PROCEDURE — 15002 WOUND PREP TRK/ARM/LEG: CPT | Performed by: SURGERY

## 2024-01-31 PROCEDURE — 86850 RBC ANTIBODY SCREEN: CPT

## 2024-01-31 PROCEDURE — 2580000003 HC RX 258: Performed by: STUDENT IN AN ORGANIZED HEALTH CARE EDUCATION/TRAINING PROGRAM

## 2024-01-31 PROCEDURE — 6370000000 HC RX 637 (ALT 250 FOR IP): Performed by: SURGERY

## 2024-01-31 PROCEDURE — 15271 SKIN SUB GRAFT TRNK/ARM/LEG: CPT | Performed by: SURGERY

## 2024-01-31 PROCEDURE — 86900 BLOOD TYPING SEROLOGIC ABO: CPT

## 2024-01-31 PROCEDURE — 3600000004 HC SURGERY LEVEL 4 BASE: Performed by: SURGERY

## 2024-01-31 PROCEDURE — 3700000000 HC ANESTHESIA ATTENDED CARE: Performed by: SURGERY

## 2024-01-31 PROCEDURE — 3700000001 HC ADD 15 MINUTES (ANESTHESIA): Performed by: SURGERY

## 2024-01-31 RX ORDER — AMIODARONE HYDROCHLORIDE 200 MG/1
200 TABLET ORAL DAILY
OUTPATIENT
Start: 2024-02-01 | End: 2024-04-13

## 2024-01-31 RX ORDER — ONDANSETRON 2 MG/ML
4 INJECTION INTRAMUSCULAR; INTRAVENOUS EVERY 6 HOURS PRN
OUTPATIENT
Start: 2024-01-31

## 2024-01-31 RX ORDER — ASPIRIN 81 MG/1
81 TABLET, CHEWABLE ORAL DAILY
OUTPATIENT
Start: 2024-02-01

## 2024-01-31 RX ORDER — ONDANSETRON 4 MG/1
4 TABLET, ORALLY DISINTEGRATING ORAL EVERY 8 HOURS PRN
OUTPATIENT
Start: 2024-01-31

## 2024-01-31 RX ORDER — ULTRASOUND COUPLING MEDIUM
GEL (GRAM) TOPICAL PRN
Status: DISCONTINUED | OUTPATIENT
Start: 2024-01-31 | End: 2024-01-31 | Stop reason: HOSPADM

## 2024-01-31 RX ORDER — SODIUM CHLORIDE, SODIUM LACTATE, POTASSIUM CHLORIDE, CALCIUM CHLORIDE 600; 310; 30; 20 MG/100ML; MG/100ML; MG/100ML; MG/100ML
INJECTION, SOLUTION INTRAVENOUS CONTINUOUS PRN
Status: DISCONTINUED | OUTPATIENT
Start: 2024-01-31 | End: 2024-01-31 | Stop reason: SDUPTHER

## 2024-01-31 RX ORDER — SODIUM CHLORIDE 9 MG/ML
INJECTION, SOLUTION INTRAVENOUS PRN
Status: DISCONTINUED | OUTPATIENT
Start: 2024-01-31 | End: 2024-01-31 | Stop reason: HOSPADM

## 2024-01-31 RX ORDER — OXYCODONE HYDROCHLORIDE 5 MG/1
5 TABLET ORAL EVERY 6 HOURS PRN
OUTPATIENT
Start: 2024-01-31

## 2024-01-31 RX ORDER — HEPARIN SODIUM 5000 [USP'U]/ML
7500 INJECTION, SOLUTION INTRAVENOUS; SUBCUTANEOUS EVERY 8 HOURS SCHEDULED
OUTPATIENT
Start: 2024-01-31

## 2024-01-31 RX ORDER — MAGNESIUM HYDROXIDE 1200 MG/15ML
LIQUID ORAL CONTINUOUS PRN
Status: DISCONTINUED | OUTPATIENT
Start: 2024-01-31 | End: 2024-01-31 | Stop reason: HOSPADM

## 2024-01-31 RX ORDER — SENNOSIDES 8.8 MG/5ML
5 LIQUID ORAL 2 TIMES DAILY
OUTPATIENT
Start: 2024-01-31

## 2024-01-31 RX ORDER — LIDOCAINE HYDROCHLORIDE 40 MG/ML
4 INJECTION, SOLUTION RETROBULBAR; TOPICAL
OUTPATIENT
Start: 2024-01-31

## 2024-01-31 RX ORDER — SODIUM CHLORIDE 0.9 % (FLUSH) 0.9 %
5-40 SYRINGE (ML) INJECTION PRN
OUTPATIENT
Start: 2024-01-31

## 2024-01-31 RX ORDER — ROCURONIUM BROMIDE 10 MG/ML
INJECTION, SOLUTION INTRAVENOUS PRN
Status: DISCONTINUED | OUTPATIENT
Start: 2024-01-31 | End: 2024-01-31 | Stop reason: SDUPTHER

## 2024-01-31 RX ORDER — SODIUM CHLORIDE 0.9 % (FLUSH) 0.9 %
5-40 SYRINGE (ML) INJECTION PRN
Status: DISCONTINUED | OUTPATIENT
Start: 2024-01-31 | End: 2024-01-31 | Stop reason: HOSPADM

## 2024-01-31 RX ORDER — LEVOTHYROXINE SODIUM 0.1 MG/1
200 TABLET ORAL DAILY
OUTPATIENT
Start: 2024-02-01

## 2024-01-31 RX ORDER — MIDAZOLAM HYDROCHLORIDE 1 MG/ML
INJECTION INTRAMUSCULAR; INTRAVENOUS PRN
Status: DISCONTINUED | OUTPATIENT
Start: 2024-01-31 | End: 2024-01-31 | Stop reason: SDUPTHER

## 2024-01-31 RX ORDER — FENTANYL CITRATE 50 UG/ML
25 INJECTION, SOLUTION INTRAMUSCULAR; INTRAVENOUS EVERY 5 MIN PRN
Status: DISCONTINUED | OUTPATIENT
Start: 2024-01-31 | End: 2024-01-31 | Stop reason: HOSPADM

## 2024-01-31 RX ORDER — FENTANYL CITRATE 50 UG/ML
50 INJECTION, SOLUTION INTRAMUSCULAR; INTRAVENOUS EVERY 5 MIN PRN
Status: DISCONTINUED | OUTPATIENT
Start: 2024-01-31 | End: 2024-01-31 | Stop reason: HOSPADM

## 2024-01-31 RX ORDER — SODIUM CHLORIDE 0.9 % (FLUSH) 0.9 %
5-40 SYRINGE (ML) INJECTION EVERY 12 HOURS SCHEDULED
Status: DISCONTINUED | OUTPATIENT
Start: 2024-01-31 | End: 2024-01-31 | Stop reason: HOSPADM

## 2024-01-31 RX ORDER — ONDANSETRON 2 MG/ML
INJECTION INTRAMUSCULAR; INTRAVENOUS PRN
Status: DISCONTINUED | OUTPATIENT
Start: 2024-01-31 | End: 2024-01-31 | Stop reason: SDUPTHER

## 2024-01-31 RX ORDER — MIDODRINE HYDROCHLORIDE 5 MG/1
5 TABLET ORAL
OUTPATIENT
Start: 2024-01-31

## 2024-01-31 RX ORDER — OXYCODONE HYDROCHLORIDE 5 MG/1
10 TABLET ORAL EVERY 6 HOURS PRN
OUTPATIENT
Start: 2024-01-31

## 2024-01-31 RX ORDER — ACETAMINOPHEN 160 MG/5ML
1000 LIQUID ORAL EVERY 8 HOURS PRN
OUTPATIENT
Start: 2024-01-31

## 2024-01-31 RX ORDER — DEXAMETHASONE SODIUM PHOSPHATE 10 MG/ML
INJECTION INTRAMUSCULAR; INTRAVENOUS PRN
Status: DISCONTINUED | OUTPATIENT
Start: 2024-01-31 | End: 2024-01-31 | Stop reason: SDUPTHER

## 2024-01-31 RX ORDER — INSULIN LISPRO 100 [IU]/ML
0-16 INJECTION, SOLUTION INTRAVENOUS; SUBCUTANEOUS EVERY 4 HOURS
OUTPATIENT
Start: 2024-01-31

## 2024-01-31 RX ORDER — FENTANYL CITRATE 50 UG/ML
INJECTION, SOLUTION INTRAMUSCULAR; INTRAVENOUS PRN
Status: DISCONTINUED | OUTPATIENT
Start: 2024-01-31 | End: 2024-01-31 | Stop reason: SDUPTHER

## 2024-01-31 RX ORDER — PHENYLEPHRINE HCL IN 0.9% NACL 1 MG/10 ML
SYRINGE (ML) INTRAVENOUS PRN
Status: DISCONTINUED | OUTPATIENT
Start: 2024-01-31 | End: 2024-01-31 | Stop reason: SDUPTHER

## 2024-01-31 RX ORDER — LANSOPRAZOLE 30 MG/1
30 TABLET, ORALLY DISINTEGRATING, DELAYED RELEASE ORAL
OUTPATIENT
Start: 2024-01-31

## 2024-01-31 RX ORDER — ALBUTEROL SULFATE 2.5 MG/3ML
2.5 SOLUTION RESPIRATORY (INHALATION)
OUTPATIENT
Start: 2024-01-31

## 2024-01-31 RX ORDER — SULFAMETHOXAZOLE AND TRIMETHOPRIM 800; 160 MG/1; MG/1
1 TABLET ORAL 2 TIMES DAILY
Qty: 14 TABLET | Refills: 0
Start: 2024-01-31 | End: 2024-02-07

## 2024-01-31 RX ORDER — SULFAMETHOXAZOLE AND TRIMETHOPRIM 800; 160 MG/1; MG/1
1 TABLET ORAL EVERY 12 HOURS SCHEDULED
Status: DISCONTINUED | OUTPATIENT
Start: 2024-01-31 | End: 2024-01-31 | Stop reason: HOSPADM

## 2024-01-31 RX ORDER — ALPRAZOLAM 0.25 MG/1
0.25 TABLET ORAL NIGHTLY PRN
OUTPATIENT
Start: 2024-01-31

## 2024-01-31 RX ADMIN — CITALOPRAM 30 MG: 20 TABLET, FILM COATED ORAL at 08:39

## 2024-01-31 RX ADMIN — ONDANSETRON 4 MG: 2 INJECTION INTRAMUSCULAR; INTRAVENOUS at 12:29

## 2024-01-31 RX ADMIN — FENTANYL CITRATE 25 MCG: 50 INJECTION, SOLUTION INTRAMUSCULAR; INTRAVENOUS at 12:00

## 2024-01-31 RX ADMIN — ROCURONIUM BROMIDE 20 MG: 10 INJECTION INTRAVENOUS at 11:10

## 2024-01-31 RX ADMIN — CEFTOLOZANE AND TAZOBACTAM 1500 MG: 1; .5 INJECTION, POWDER, LYOPHILIZED, FOR SOLUTION INTRAVENOUS at 05:30

## 2024-01-31 RX ADMIN — FENTANYL CITRATE 25 MCG: 50 INJECTION, SOLUTION INTRAMUSCULAR; INTRAVENOUS at 11:40

## 2024-01-31 RX ADMIN — LEVOTHYROXINE SODIUM 200 MCG: 100 TABLET ORAL at 05:30

## 2024-01-31 RX ADMIN — SODIUM CHLORIDE, POTASSIUM CHLORIDE, SODIUM LACTATE AND CALCIUM CHLORIDE: 600; 310; 30; 20 INJECTION, SOLUTION INTRAVENOUS at 10:54

## 2024-01-31 RX ADMIN — LANSOPRAZOLE 30 MG: 30 TABLET, ORALLY DISINTEGRATING, DELAYED RELEASE ORAL at 05:30

## 2024-01-31 RX ADMIN — SUGAMMADEX 200 MG: 100 INJECTION, SOLUTION INTRAVENOUS at 12:34

## 2024-01-31 RX ADMIN — ROCURONIUM BROMIDE 10 MG: 10 INJECTION INTRAVENOUS at 11:38

## 2024-01-31 RX ADMIN — Medication 100 MCG: at 11:46

## 2024-01-31 RX ADMIN — ROCURONIUM BROMIDE 10 MG: 10 INJECTION INTRAVENOUS at 11:33

## 2024-01-31 RX ADMIN — OXYCODONE HYDROCHLORIDE 10 MG: 5 TABLET ORAL at 05:30

## 2024-01-31 RX ADMIN — DOCUSATE SODIUM LIQUID 100 MG: 100 LIQUID ORAL at 08:40

## 2024-01-31 RX ADMIN — FENTANYL CITRATE 50 MCG: 50 INJECTION, SOLUTION INTRAMUSCULAR; INTRAVENOUS at 10:58

## 2024-01-31 RX ADMIN — MIDODRINE HYDROCHLORIDE 5 MG: 5 TABLET ORAL at 16:55

## 2024-01-31 RX ADMIN — SENNOSIDES 8.8 MG: 8.8 LIQUID ORAL at 08:40

## 2024-01-31 RX ADMIN — HYDROMORPHONE HYDROCHLORIDE 0.5 MG: 1 INJECTION, SOLUTION INTRAMUSCULAR; INTRAVENOUS; SUBCUTANEOUS at 12:47

## 2024-01-31 RX ADMIN — MIDAZOLAM 2 MG: 1 INJECTION INTRAMUSCULAR; INTRAVENOUS at 10:58

## 2024-01-31 RX ADMIN — MIDODRINE HYDROCHLORIDE 5 MG: 5 TABLET ORAL at 08:40

## 2024-01-31 RX ADMIN — SODIUM CHLORIDE, PRESERVATIVE FREE 10 ML: 5 INJECTION INTRAVENOUS at 08:40

## 2024-01-31 RX ADMIN — Medication 100 MCG: at 12:07

## 2024-01-31 RX ADMIN — FENTANYL CITRATE 50 MCG: 50 INJECTION, SOLUTION INTRAMUSCULAR; INTRAVENOUS at 13:02

## 2024-01-31 RX ADMIN — DEXAMETHASONE SODIUM PHOSPHATE 4 MG: 10 INJECTION INTRAMUSCULAR; INTRAVENOUS at 11:26

## 2024-01-31 RX ADMIN — HEPARIN SODIUM 7500 UNITS: 5000 INJECTION INTRAVENOUS; SUBCUTANEOUS at 05:30

## 2024-01-31 RX ADMIN — Medication 100 MCG: at 11:08

## 2024-01-31 ASSESSMENT — PULMONARY FUNCTION TESTS
PIF_VALUE: 19
PIF_VALUE: 25
PIF_VALUE: 25
PIF_VALUE: 16
PIF_VALUE: 14
PIF_VALUE: 23
PIF_VALUE: 16

## 2024-01-31 ASSESSMENT — PAIN DESCRIPTION - LOCATION
LOCATION: ABDOMEN
LOCATION: ABDOMEN

## 2024-01-31 ASSESSMENT — ENCOUNTER SYMPTOMS: STRIDOR: 0

## 2024-01-31 ASSESSMENT — PAIN SCALES - GENERAL
PAINLEVEL_OUTOF10: 10
PAINLEVEL_OUTOF10: 9
PAINLEVEL_OUTOF10: 8
PAINLEVEL_OUTOF10: 8

## 2024-01-31 NOTE — OP NOTE
noted to have healthy appearing granulation tissue. The Xenmatrix mesh  was in place in the epigastric area, with a small area of disruption of the mesh showing granulating bowel, which is improved from last operation.  Masonix was used to mechanically debride and prepare the entire open wound. using 0 pds we sutured down the abdomninal wound eduges to the abdomainal wall.  micromatrix powder in both paste and powder forma was used over entire wound and to fill gapss in mesh and interesses. The wound vac x2 was set to continuous suction at 125mmHg and had a good seal at the end of the case. All counts were correct at the end of the case.      The patient was transferred back to the pacu in stable condition. He tolerated the procedure well. Plan for takeback to OR next Tuesday  for wound prepeation, application of skin substitute, and wound vac placement.   Electronically signed by Escobar Ulloa MD on 1/31/2024 at 12:58 PM

## 2024-01-31 NOTE — PLAN OF CARE
Problem: Respiratory - Adult  Goal: Achieves optimal ventilation and oxygenation  1/30/2024 1940 by Seema Ramirez RCP  Outcome: Progressing

## 2024-01-31 NOTE — PROGRESS NOTES
PROGRESS NOTE          PATIENT NAME: Ruben Dimas  MEDICAL RECORD NO. [id number]  DATE: 1/31/2024  SURGEON: Jake  PRIMARY CARE PHYSICIAN: Ramy Lazo MD    HD: # 30    ASSESSMENT    Patient Active Problem List   Diagnosis    Alcoholic cirrhosis of liver (HCC), sober since 2013    Peripheral edema    Bipolar disorder (HCC)    Type 2 diabetes mellitus with diabetic neuropathy, with long-term current use of insulin (HCC)    Essential hypertension, currently hypotensive    Dyslipidemia    Weakness    Acute metabolic encephalopathy    FABI (obstructive sleep apnea)    Primary osteoarthritis of right knee    Type 2 diabetes mellitus with diabetic neuropathy (HCC)    Acquired hypothyroidism    Urine retention    Anemia    Slow transit constipation    Iron deficiency anemia due to chronic blood loss    Gastroesophageal reflux disease without esophagitis    LEONARDA (acute kidney injury) (HCC)    Secondary esophageal varices without bleeding (HCC)    Portal hypertension (HCC)    Obesity, morbid, BMI 50 or higher (HCC)    Venous stasis dermatitis of both lower extremities    Cellulitis of perineum    Chronic indwelling Clemons catheter    Anxiety and depression    Back pain, chronic    BPH (benign prostatic hyperplasia)    Chronic diastolic CHF (congestive heart failure) (HCC)    Cirrhosis (HCC)    Diabetes mellitus (HCC)    GERD (gastroesophageal reflux disease)    Hepatitis    Hiatal hernia    Hypertension, essential    IBS (irritable bowel syndrome)    Morbid obesity with BMI of 45.0-49.9, adult (HCC)    Neuropathy    Chronic respiratory failure with hypoxia, on home oxygen therapy (HCC)    Sleep apnea    Thyroid disease    Urinary bladder neurogenic dysfunction    Acute UTI    Musculoskeletal immobility    Pyocystis    Myoclonic jerking    Thrombocytopenia (HCC)    Hypotension    Sepsis with acute hypercapnic respiratory failure and septic shock (HCC)    Right lower lobe pneumonia    Acute kidney injury superimposed on  CKD (HCC)    Somnolence    Acute respiratory acidosis (HCC)    Lymphedema of both lower extremities    Venous stasis    Acute GI bleeding    PAF (paroxysmal atrial fibrillation) ([facility])    Secondary hypercoagulable state (HCC)    Anemia due to acute blood loss    Altered mental status    Intertriginous dermatitis associated with moisture    Dermatitis associated with moisture    Hyponatremia    Wounds, multiple    Sepsis due to Proteus species ([facility])    Hyperkalemia    Hypoglycemia    Acute on chronic respiratory failure with hypoxia ([facility])    Encounter for palliative care    Refeeding syndrome    Septic shock (HCC)    Acute heart failure with preserved ejection fraction (HFpEF) ([facility])    Acute cystitis without hematuria    S/P percutaneous endoscopic gastrostomy (PEG) tube placement ([facility])    Necrotizing soft tissue infection    Lactic acidosis    CRP elevated    Bandemia    Goals of care, counseling/discussion    Atrial fibrillation with RVR ([facility])    Dislodged gastrostomy tube    Pneumonia of left lower lobe due to Pseudomonas species ([facility])    Leukemoid reaction    Chronic diastolic heart failure (HCC)    Acute heart failure with preserved ejection fraction (HCC)       MEDICAL DECISION MAKING AND PLAN    Neuro  MMPT: Tylenol 1000 mg every 8 hours as needed, Roxicodone pain panel every 6 hours.  Dilaudid 0.5 mg every 3 hours as needed, Xanax as needed  home celexa   CV amiodarone daily, midodrine 15 mg 3 times daily  Heme: hemoglobin 7.4 down from 8, s/p 1 unit PRBC on 1/29,  DVT ppx-heparin 7,500 units 3 times daily  Pulm chronic tracheostomy in place   Albuterol nebulizer every 2 hours as needed  PRBC, rate of 14, tidal volume 600 PEEP of 8.  CPAP trials daily 8/8   Renal: wells in place -chronic for retention   Monitor UOP  minced and moist diet, supplemental tube feeding as needed  Bowel regimen  Prevacid twice daily  ID continue Zerbaxa, pseudomonas resistant PNA, leukocytosis improving  Endo history of

## 2024-01-31 NOTE — PROGRESS NOTES
[facility] -   Infectious diseases evaluation  admission date 1/1/2024    reason for consultation:   Necrotizing Faciitis    Impression :   Current:  1/1/24 Necrotizing faciitis 2/2 Polymicrobial infection with T2DM and PEG tube dislodgement and infected   LEONARDA on CKD   1/2/24 S/p partial wedge gastrectomy due to infected PEG tube and dislodgement   1/2/24 S/p debridement of abdomen and indwelling mesh removal,  due to concern for necrotizing fasciitis, wound vac in place  Cx abd wall 1/5 kleb x 2 and enterococcus and saccharomyces   1/3/24 GASTROSTOMY TUBE PLACEMENT, REPAIR OF LARGE VENTRAL DEFECT   Alcoholic Liver cirrhosis - found intra op  Elevated CRP  Lactic acidosis- initial 4.1 now 6.2  Bandemia leukemoid reaction- WBC 15 to 38 to 27 -45  Proteus UTI - treated  RLL pseudomonas pneumonia 1/1 - treated  Iv phlebitis -iv removed   1/11 R lung collapse - sp again pseudomonas R meropenem    Other:    Discussion / summary of stay / plan of care   Respiratory culture: pseudomonas multiS 1/1/24 -treated  U cx proteus multiS - treated  Bcx pending - SCN x 1  is a contamination  large abd wound infection w kleb enterococcus and saccharomyces  Iv phlebitis pulled and resolved  R lung collapse 1/11 w mucous plug, sp pseudomonas  1/15  w bandemia  Repeat Sp cx pseudomonas  R meropenem and S levaquin( resulted 1/18) -   pt on amiodarone -  post zerbaxa 1/18 - till 1/25 -sensitive - completed    Recommendations     1/28 RLL infilt and bandemia  Resumed zerbaxa due to RLL infiltrate. 1/28-2/2  1/28 Sputum pseudomonas and stenotrophomoas pend sensi  He is better in general and WBC better 1/29  Reconsiled zerbaxa till 2/2 -might have to change the AB per final cx    Infection Control Recommendations   [place] Precautions  Contact Isolation       Antimicrobial Stewardship Recommendations   Simplification of therapy  Targeted therapy    History of Present Illness:   Initial

## 2024-01-31 NOTE — BRIEF OP NOTE
Brief Postoperative Note      Patient: Ruben Dimas  YOB: 1965  MRN: [id number]    Date of Procedure: 1/31/2024    Pre-Op Diagnosis Codes:     * Necrotizing fasciitis (HCC) [M72.6]    Post-Op Diagnosis: Same   Procedure     Wound preparation with misonix [id number]= 1216sq cm  Application of skin substitute [id number]= 1216sq cm  Negative pressure wound therapy > 50 cm    Surgeon(s):  Escobar Ulloa MD    Assistant:  * No surgical staff found *    Anesthesia: General    Estimated Blood Loss (mL): Minimal    Complications: None    Specimens:   * No specimens in log *    Implants:  Implant Name Type Inv. Item Serial No.  Lot No. LRB No. Used Action   DRESSING WND MICRONIZED PARTIC 1000 MG MATRISTEM MICROMATRIX - [id number]  DRESSING WND MICRONIZED PARTIC 1000 MG MATRISTEM MICROMATRIX [id number] [employer]-Dinetouch [id number] N/A 1 Implanted   DRESSING WND MICRONIZED PARTIC 1000 MG MATRISTEM MICROMATRIX - [id number]  DRESSING WND MICRONIZED PARTIC 1000 MG MATRISTEM MICROMATRIX [id number] [employer]-WD [id number] N/A 1 Implanted   DRESSING WND MICRONIZED PARTIC 1000 MG MATRISTEM MICROMATRIX - [id number]  DRESSING WND MICRONIZED PARTIC 1000 MG MATRISTEM MICROMATRIX [id number] [employer]LL Postachio-WD [id number] N/A 1 Implanted   DRESSING WND MICRONIZED PARTIC 1000 MG MATRISTEM MICROMATRIX - [id number]  DRESSING WND MICRONIZED PARTIC 1000 MG MATRISTEM MICROMATRIX [id number] [employer]-WD [id number] N/A 1 Implanted   DRESSING WND MICRONIZED PARTIC 1000 MG MATRISTEM MICROMATRIX - [id number]  DRESSING WND MICRONIZED PARTIC 1000 MG MATRISTEM MICROMATRIX [id number] ACELL Postachio-Dinetouch [id number] N/A 1 Implanted   DRESSING WND MICRONIZED PARTIC 1000 MG MATRISTEM MICROMATRIX - [id number]  DRESSING WND MICRONIZED PARTIC 1000 MG MATRISTEM MICROMATRIX [id number] [employer]WD [id number] N/A 1 Implanted   DRESSING WND MICRONIZED PARTIC 1000 MG MATRISTEM MICROMATRIX - [id number]  DRESSING WND MICRONIZED PARTIC 1000 MG MATRISTEM MICROMATRIX [id number] [employer]-WD [id number] N/A 1 Implanted  Appearance Serous 01/09/24 0800   Drain Status Compressed 01/09/24 0900   Output (ml) 0 ml 01/09/24 1109       [REMOVED] NG/OG/NJ/NE Tube Nasogastric Right nostril (Removed)   Surrounding Skin Clean, dry & intact 01/08/24 1735   Securement device Bridle 01/08/24 1735   Status Clamped 01/08/24 1735   Placement Verified External Catheter Length 01/08/24 1735   NG/OG/NJ/NE External Measurement (cm) 60 cm 01/08/24 1735   Drainage Appearance Bloody;Dark Red 01/08/24 1735   Free Water/Flush (mL) 80 mL 01/08/24 0400   Output (mL) 100 ml 01/08/24 0400   Action Taken Placement verified (comment) 01/08/24 1735       [REMOVED] Gastrostomy/Enterostomy/Jejunostomy Tube Percutaneous Endoscopic Gastrostomy (PEG) 20 fr (Removed)       [REMOVED] Colostomy RLQ  (Removed)       [REMOVED] Urostomy RLQ (Removed)   Stoma  Assessment Other (Comment) 01/20/24 0429   Collection Container Standard 01/20/24 0429       [REMOVED] Urostomy  RLQ (Removed)   Collection Container Standard 01/24/24 0800   Output (ml) 0 ml 01/24/24 0502       [REMOVED] Urinary Catheter Clemons (Removed)   Catheter Indications Urinary retention (acute or chronic), continuous bladder irrigation or bladder outlet obstruction 01/02/24 2000   Site Assessment Pink 01/02/24 2000   Urine Color Carisa 01/02/24 2000   Urine Appearance Clear 01/02/24 2000   Collection Container Standard 01/02/24 2000   Securement Method Securing device (Describe) 01/02/24 2000   Catheter Care  Soap and water 01/02/24 0800   Catheter Best Practices  Drainage tube clipped to bed;Catheter secured to thigh;Tamper seal intact;Bag below bladder;Bag not on floor;Lack of dependent loop in tubing;Drainage bag less than half full 01/02/24 2000   Status Draining 01/02/24 2000   Output (mL) 75 mL 01/02/24 1856       [REMOVED] Urinary Catheter 01/02/24 2 Way (Removed)   Catheter Indications Urinary retention (acute or chronic), continuous bladder irrigation or bladder outlet obstruction 01/10/24 0800   Site

## 2024-01-31 NOTE — ANESTHESIA POSTPROCEDURE EVALUATION
Department of Anesthesiology  Postprocedure Note    Patient: Ruben Dimas  MRN: [id number]  YOB: 1965  Date of evaluation: 1/31/2024    Procedure Summary     Date: 01/31/24 Room / Location: [address]    Anesthesia Start: 1054 Anesthesia Stop: 1249    Procedure: RE-EXPLORATION OF ABDOMINAL WOUND, WOUND BED PREPARATION, WOUND VAC  PLACEMENT (MISONIX) Diagnosis:       Necrotizing fasciitis (HCC)      (Necrotizing fasciitis (HCC) [M72.6])    Surgeons: Escobar Ulloa MD Responsible Provider: Beverly June MD    Anesthesia Type: general ASA Status: 3          Anesthesia Type: No value filed.    Des Phase I: Des Score: 7    Des Phase II:      Anesthesia Post Evaluation    Patient location during evaluation: PACU  Patient participation: complete - patient participated  Level of consciousness: awake and alert  Pain score: 1  Airway patency: patent  Nausea & Vomiting: no nausea and no vomiting  Cardiovascular status: hemodynamically stable  Respiratory status: acceptable  Hydration status: euvolemic  Pain management: adequate        No notable events documented.

## 2024-01-31 NOTE — CARE COORDINATION
Transitional planning    Writer received call from Pito with trauma inquiring if Dr Ulloa can take wound vac down on Tues at [facility] and if she can go with it. Call to Danika and ok to do so, nurse lynnette convey this when calling report, call to Sarika with [employer] ([place] number not working) and updated.  Call back to Pito and updated.      [id number] writer to room , updated on plan for transfer to [facility], nods head in agreement,  RN provided number for report.

## 2024-01-31 NOTE — PROGRESS NOTES
Wound care note    Post OR 1/31 care note    Abdominal wound vacs x2 to remain intact, will return to OR Tuesday Feb 6th for wound preperation, application of skin substitute and wound vac placment.  Vacs to remain at 125mmhg at all times    R abdominal flank wound pack kerlix change daily.    Ok for transfer to [facility] for care, will need to arrange and ensure OR for Tuesday.

## 2024-01-31 NOTE — PLAN OF CARE
Problem: Discharge Planning  Goal: Discharge to home or other facility with appropriate resources  Outcome: Progressing     Problem: Pain  Goal: Verbalizes/displays adequate comfort level or baseline comfort level  Outcome: Progressing     Problem: Safety - Adult  Goal: Free from fall injury  Outcome: Progressing     Problem: Respiratory - Adult  Goal: Achieves optimal ventilation and oxygenation  1/31/2024 0318 by Oliva Alarcon RN  Outcome: Progressing  1/30/2024 1940 by Seema Ramirez RCP  Outcome: Progressing     Problem: Chronic Conditions and Co-morbidities  Goal: Patient's chronic conditions and co-morbidity symptoms are monitored and maintained or improved  Outcome: Progressing     Problem: Skin/Tissue Integrity  Goal: Absence of new skin breakdown  Description: 1.  Monitor for areas of redness and/or skin breakdown  2.  Assess vascular access sites hourly  3.  Every 4-6 hours minimum:  Change oxygen saturation probe site  4.  Every 4-6 hours:  If on nasal continuous positive airway pressure, respiratory therapy assess nares and determine need for appliance change or resting period.  Outcome: Not Progressing     Problem: ABCDS Injury Assessment  Goal: Absence of physical injury  Outcome: Progressing     Problem: Skin/Tissue Integrity  Goal: Absence of new skin breakdown  Description: 1.  Monitor for areas of redness and/or skin breakdown  2.  Assess vascular access sites hourly  3.  Every 4-6 hours minimum:  Change oxygen saturation probe site  4.  Every 4-6 hours:  If on nasal continuous positive airway pressure, respiratory therapy assess nares and determine need for appliance change or resting period.  Outcome: Not Progressing

## 2024-01-31 NOTE — PROGRESS NOTES
Physical Therapy        Physical Therapy Cancel Note      DATE: 2024    NAME: Ruben Dimas  MRN: [id number]   : 1965      Patient not seen this date for Physical Therapy due to:    Surgery/Procedure: Pt off the floor for  RE-EXPLORATION OF ABDOMINAL WOUND, WOUND BED PREPARATION, WOUND VAC PLACEMENT  Will check back       Electronically signed by Meena Macias PTA on 2024 at 10:55 AM

## 2024-01-31 NOTE — PROGRESS NOTES
[facility]  Occupational Therapy Not Seen Note    DATE: 2024    NAME: Ruben Dimas  MRN: [id number]   : 1965      Patient not seen this date for Occupational Therapy due to:    Surgery/Procedure: Per chart pt to OR today  for washout with plan to d/c later today. OT will continue to follow if pt is still admitted in AM.    Electronically signed by ROMA Gaston on 2024 at 8:08 AM

## 2024-01-31 NOTE — PROGRESS NOTES
[facility] -   Infectious diseases evaluation  admission date 1/1/2024    reason for consultation:   Necrotizing Faciitis    Impression :   Current:  1/1/24 Necrotizing faciitis 2/2 Polymicrobial infection with T2DM and PEG tube dislodgement and infected   LEONARDA on CKD   1/2/24 S/p partial wedge gastrectomy due to infected PEG tube and dislodgement   1/2/24 S/p debridement of abdomen and indwelling mesh removal,  due to concern for necrotizing fasciitis, wound vac in place  Cx abd wall 1/5 kleb x 2 and enterococcus and saccharomyces   1/3/24 GASTROSTOMY TUBE PLACEMENT, REPAIR OF LARGE VENTRAL DEFECT   Alcoholic Liver cirrhosis - found intra op  Elevated CRP  Lactic acidosis- initial 4.1 now 6.2  Bandemia leukemoid reaction- WBC 15 to 38 to 27 -45  Proteus UTI - treated  RLL pseudomonas pneumonia 1/1 - treated  Iv phlebitis -iv removed   1/11 R lung collapse - sp again pseudomonas R meropenem  1/28 RLL infilt and bandemia  Resumed zerbaxa due to RLL infiltrate. 1/28-2/2 1/28 Sputum pseudomonas and stenotrophomoas pend sensi  Other:    Discussion / summary of stay / plan of care   Respiratory culture: pseudomonas multiS 1/1/24 -treated  U cx proteus multiS - treated  Bcx pending - SCN x 1  is a contamination  large abd wound infection w kleb enterococcus and saccharomyces  Iv phlebitis pulled and resolved  R lung collapse 1/11 w mucous plug, sp pseudomonas  1/15  w bandemia  Repeat Sp cx pseudomonas  R meropenem and S levaquin( resulted 1/18) -   pt on amiodarone -  post zerbaxa 1/18 - till 1/25 -sensitive - completed    Recommendations     1/28 RLL infilt and bandemia  Resumed zerbaxa due to RLL infiltrate. 1/28-2/2  1/28 Sputum pseudomonas and stenotrophomoas S bactrim and levaquin    Reconsiled zerbaxa till 2/2 -might have to change the AB per final cx  Add bactrim DS bid x 7 days    Infection Control Recommendations   [place] Precautions  Contact Isolation

## 2024-01-31 NOTE — ANESTHESIA PRE PROCEDURE
2022 04:44 AM    AGRATIO 0.6 2024 06:49 AM    LABGLOM >60 2024 06:42 AM    GLUCOSE 214 2024 06:42 AM    GLUCOSE 102 2011 02:20 PM    PROT 5.5 2024 06:49 AM    CALCIUM 7.8 2024 06:42 AM    BILITOT 0.7 2024 06:49 AM    ALKPHOS 432 2024 06:49 AM    AST 14 2024 06:49 AM    ALT 14 2024 06:49 AM       POC Tests:   Recent Labs     24  0641   POCGLU 121*       Coags:   Lab Results   Component Value Date/Time    PROTIME 13.3 2024 08:29 AM    PROTIME 11.6 2012 09:23 AM    INR 1.0 2024 08:29 AM    APTT 24.4 2024 03:00 PM       HCG (If Applicable): No results found for: \"PREGTESTUR\", \"PREGSERUM\", \"HCG\", \"HCGQUANT\"     ABGs:   Lab Results   Component Value Date/Time    PHART 7.197 2024 03:13 PM    PO2ART 61.7 2024 03:13 PM    GKS6PYA 41.2 2024 03:13 PM    SAU6XLM 15.4 2024 03:13 PM    V9MFSOPP 89.4 2024 03:13 PM        Type & Screen (If Applicable):  No results found for: \"LABABO\", \"LABRH\"    Drug/Infectious Status (If Applicable):  Lab Results   Component Value Date/Time    HEPCAB NONREACTIVE 2014 05:57 PM       COVID-19 Screening (If Applicable):   Lab Results   Component Value Date/Time    COVID19 Not Detected 2024 07:51 AM         EKG 2024  Atrial flutter with variable A-V block  Left axis deviation  Right bundle branch block  T wave abnormality, consider inferior ischemia  Abnormal ECG  When compared with ECG of 2024 14:41,  Significant changes have occurred       [id number]   Atrial fibrillation with rapid ventricular response  Right bundle branch block  Left anterior fascicular block   Bifascicular block   Moderate voltage criteria for LVH, may be normal variant  T wave abnormality, consider lateral ischemia  Abnormal ECG  When compared with ECG of 2023 15:54,  T wave inversion less evident in Anterolateral leads    TTE 2024  •  Left Ventricle: Preserved left

## 2024-02-01 ENCOUNTER — TELEPHONE (OUTPATIENT)
Age: 59
End: 2024-02-01

## 2024-02-01 LAB
25(OH)D3 SERPL-MCNC: 22.7 NG/ML (ref 30–100)
ABO/RH: NORMAL
ALBUMIN SERPL-MCNC: 2.2 G/DL (ref 3.5–5.2)
ALP SERPL-CCNC: 459 U/L (ref 40–129)
ALT SERPL-CCNC: 19 U/L (ref 5–41)
ANION GAP SERPL CALCULATED.3IONS-SCNC: 14 MMOL/L (ref 9–17)
ANTIBODY SCREEN: NEGATIVE
ARM BAND NUMBER: NORMAL
AST SERPL-CCNC: 24 U/L
BILIRUB SERPL-MCNC: 0.8 MG/DL (ref 0.3–1.2)
BLOOD BANK DISPENSE STATUS: NORMAL
BLOOD BANK DISPENSE STATUS: NORMAL
BLOOD BANK SAMPLE EXPIRATION: NORMAL
BPU ID: NORMAL
BPU ID: NORMAL
BUN SERPL-MCNC: 33 MG/DL (ref 6–20)
BUN/CREAT SERPL: 25 (ref 9–20)
CALCIUM SERPL-MCNC: 8.6 MG/DL (ref 8.6–10.4)
CHLORIDE SERPL-SCNC: 104 MMOL/L (ref 98–107)
CO2 SERPL-SCNC: 16 MMOL/L (ref 20–31)
COMPONENT: NORMAL
COMPONENT: NORMAL
CREAT SERPL-MCNC: 1.3 MG/DL (ref 0.7–1.2)
CROSSMATCH RESULT: NORMAL
CROSSMATCH RESULT: NORMAL
ERYTHROCYTE [DISTWIDTH] IN BLOOD BY AUTOMATED COUNT: 21.8 % (ref 11.8–14.4)
GFR SERPL CREATININE-BSD FRML MDRD: >60 ML/MIN/1.73M2
GLUCOSE SERPL-MCNC: 179 MG/DL (ref 70–99)
HCT VFR BLD AUTO: 26.6 % (ref 40.7–50.3)
HGB BLD-MCNC: 8 G/DL (ref 13–17)
MAGNESIUM SERPL-MCNC: 2 MG/DL (ref 1.6–2.6)
MCH RBC QN AUTO: 29.6 PG (ref 25.2–33.5)
MCHC RBC AUTO-ENTMCNC: 30.1 G/DL (ref 28.4–34.8)
MCV RBC AUTO: 98.5 FL (ref 82.6–102.9)
NRBC BLD-RTO: 0 PER 100 WBC
PHOSPHATE SERPL-MCNC: 4.5 MG/DL (ref 2.5–4.5)
PLATELET # BLD AUTO: 121 K/UL (ref 138–453)
PMV BLD AUTO: 10.3 FL (ref 8.1–13.5)
POTASSIUM SERPL-SCNC: 4.5 MMOL/L (ref 3.7–5.3)
PROT SERPL-MCNC: 6.2 G/DL (ref 6.4–8.3)
RBC # BLD AUTO: 2.7 M/UL (ref 4.21–5.77)
SODIUM SERPL-SCNC: 134 MMOL/L (ref 135–144)
TRANSFUSION STATUS: NORMAL
TRANSFUSION STATUS: NORMAL
TSH SERPL DL<=0.05 MIU/L-ACNC: 1.09 UIU/ML (ref 0.3–5)
UNIT DIVISION: 0
UNIT DIVISION: 0
WBC OTHER # BLD: 21.3 K/UL (ref 3.5–11.3)

## 2024-02-01 PROCEDURE — 85027 COMPLETE CBC AUTOMATED: CPT

## 2024-02-01 PROCEDURE — 84100 ASSAY OF PHOSPHORUS: CPT

## 2024-02-01 PROCEDURE — 83735 ASSAY OF MAGNESIUM: CPT

## 2024-02-01 PROCEDURE — 82306 VITAMIN D 25 HYDROXY: CPT

## 2024-02-01 PROCEDURE — 84443 ASSAY THYROID STIM HORMONE: CPT

## 2024-02-01 PROCEDURE — 80053 COMPREHEN METABOLIC PANEL: CPT

## 2024-02-01 PROCEDURE — 83036 HEMOGLOBIN GLYCOSYLATED A1C: CPT

## 2024-02-01 NOTE — TELEPHONE ENCOUNTER
Dr. Ulloa asked for patient to be scheduled for wound preparation with Masonix, application of a-cell skin substitute and wound change change at [place] @ 9 AM on Tuesday 2/6/24. Called [facility] surgery scheduling and spoke with Ney and got him on the schedule. Patient was discharging to [facility] at 7 PM 1/31. Ney called and stated she could not find a room number for this patient at [facility] so that they could PAT prior to surgery. Upon reviewing patient's chart, BRITTNEY notes patient was discharging to [facility] and not [facility].     I spoke with Pito GROVES whom also thought patient was discharging to [facility]. He contacted the liaison for [facility], Danika, who said that patient was discharged to [facility]. Pito advised to keep the patient on the schedule at [facility] as they are going to work on having the patient transferred there on Tuesday for surgery and possibly keeping him at [facility] post op if a bed is available as this would be most convenient for his surgical needs. Contacted Ney at [facility] surgery scheduling and advised her of all this information.     This will need followed up on tomorrow as I am out of office. I provided Ney with [facility] scheduling phone number for [facility] to call tomorrow for PAT, [phone] charge RN direct line and [phone] for [facility] main #.     I also spoke with Danika with [facility]. She informed that the original plan was for patient to be transferred to [facility] but the family refused wanting him in [place]. Her boss is working on the plan as far as transporting patient to [facility] for OR on Tuesday and possible transfer to stay at [facility] post op.    Ney for surgery scheduling phone number: [phone]  Tha liaison for [facility]: [phone]

## 2024-02-02 ENCOUNTER — HOSPITAL ENCOUNTER (OUTPATIENT)
Dept: PREADMISSION TESTING | Age: 59
Discharge: HOME | End: 2024-02-06

## 2024-02-02 VITALS — BODY MASS INDEX: 45.48 KG/M2 | WEIGHT: 315 LBS

## 2024-02-02 LAB
MICROORGANISM SPEC CULT: ABNORMAL
MICROORGANISM/AGENT SPEC: ABNORMAL
SPECIMEN DESCRIPTION: ABNORMAL

## 2024-02-02 RX ORDER — NICOTINE POLACRILEX 4 MG
15 LOZENGE BUCCAL DAILY PRN
COMMUNITY

## 2024-02-02 RX ORDER — ALPRAZOLAM 0.25 MG/1
0.25 TABLET ORAL NIGHTLY PRN
COMMUNITY

## 2024-02-02 RX ORDER — PANTOPRAZOLE SODIUM 40 MG/1
40 FOR SUSPENSION ORAL
COMMUNITY
End: 2024-02-06 | Stop reason: ALTCHOICE

## 2024-02-02 RX ORDER — HEPARIN SODIUM 5000 [USP'U]/ML
7500 INJECTION, SOLUTION INTRAVENOUS; SUBCUTANEOUS EVERY 8 HOURS SCHEDULED
COMMUNITY

## 2024-02-02 RX ORDER — SENNOSIDES A AND B 8.6 MG/1
1 TABLET, FILM COATED ORAL 2 TIMES DAILY
COMMUNITY

## 2024-02-02 NOTE — PROGRESS NOTES
Pre-op Instructions For Patient Being Admitted After Surgery    Medication Instructions:                               2/6/24 Abdominal wound surgery  Please note: If patient is unable to sign consent for himself, please arrange to have someone with him who can do so.     Please stop herbs and any supplements now (includes vitamins and minerals).    Please contact your surgeon and prescribing physician for pre-op instructions for any blood thinners. Aspirin and Heparin     If you have inhalers/aerosol treatments at home, please use them the morning of your surgery and bring the inhalers with you to the hospital.    Please take the following medications the morning of your surgery with a sip of water:    Amiodarone, Midodrine, Levothyroxine, and Pantoprazole. Please consult prescribing physician as to 2/5/24 night dose of insulin.     Surgery Instructions:  After midnight before surgery:  Do not eat or drink anything, including water, mints, gum, and hard candy.  You may brush your teeth without swallowing.  No smoking, chewing tobacco, or street drugs.    Please shower or bathe before surgery.  If you were given Surgical Scrub Chlorhexidine Gluconate Liquid (CHG), please shower the night before and the morning of your surgery following the detailed instructions you received during your pre-admission visit. (If available)      Please do not wear any cologne, lotion, powder, deodorant, jewelry, piercings, perfume, makeup, nail polish, hair accessories, or hair spray on the day of surgery.  Wear loose comfortable clothing.    Leave your valuables at home.  Bring a storage case for any glasses/contacts.    The Day of Surgery:  Arrive at [facility] Surgery Entrance at the time directed by your surgeon and check in at the desk.  Arrival time 0700  ([facility] to arrange transportation )     If you have a living will or healthcare power of , please bring a copy.    You

## 2024-02-03 LAB
ANION GAP SERPL CALCULATED.3IONS-SCNC: 13 MMOL/L (ref 9–17)
BUN SERPL-MCNC: 30 MG/DL (ref 6–20)
BUN/CREAT SERPL: 23 (ref 9–20)
CALCIUM SERPL-MCNC: 8.4 MG/DL (ref 8.6–10.4)
CHLORIDE SERPL-SCNC: 101 MMOL/L (ref 98–107)
CO2 SERPL-SCNC: 17 MMOL/L (ref 20–31)
CREAT SERPL-MCNC: 1.3 MG/DL (ref 0.7–1.2)
ERYTHROCYTE [DISTWIDTH] IN BLOOD BY AUTOMATED COUNT: 21.2 % (ref 11.8–14.4)
EST. AVERAGE GLUCOSE BLD GHB EST-MCNC: 103 MG/DL
GFR SERPL CREATININE-BSD FRML MDRD: >60 ML/MIN/1.73M2
GLUCOSE SERPL-MCNC: 108 MG/DL (ref 70–99)
HBA1C MFR BLD: 5.2 % (ref 4–6)
HCT VFR BLD AUTO: 25.8 % (ref 40.7–50.3)
HGB BLD-MCNC: 7.7 G/DL (ref 13–17)
MCH RBC QN AUTO: 29.7 PG (ref 25.2–33.5)
MCHC RBC AUTO-ENTMCNC: 29.8 G/DL (ref 28.4–34.8)
MCV RBC AUTO: 99.6 FL (ref 82.6–102.9)
NRBC BLD-RTO: 0 PER 100 WBC
PLATELET # BLD AUTO: 145 K/UL (ref 138–453)
PMV BLD AUTO: 10.1 FL (ref 8.1–13.5)
POTASSIUM SERPL-SCNC: 4 MMOL/L (ref 3.7–5.3)
RBC # BLD AUTO: 2.59 M/UL (ref 4.21–5.77)
SODIUM SERPL-SCNC: 131 MMOL/L (ref 135–144)
WBC OTHER # BLD: 11.2 K/UL (ref 3.5–11.3)

## 2024-02-03 PROCEDURE — 85027 COMPLETE CBC AUTOMATED: CPT

## 2024-02-03 PROCEDURE — 80048 BASIC METABOLIC PNL TOTAL CA: CPT

## 2024-02-04 LAB
ANION GAP SERPL CALCULATED.3IONS-SCNC: 11 MMOL/L (ref 9–17)
BUN SERPL-MCNC: 27 MG/DL (ref 6–20)
BUN/CREAT SERPL: 19 (ref 9–20)
CALCIUM SERPL-MCNC: 8.8 MG/DL (ref 8.6–10.4)
CHLORIDE SERPL-SCNC: 102 MMOL/L (ref 98–107)
CO2 SERPL-SCNC: 19 MMOL/L (ref 20–31)
CREAT SERPL-MCNC: 1.4 MG/DL (ref 0.7–1.2)
ERYTHROCYTE [DISTWIDTH] IN BLOOD BY AUTOMATED COUNT: 20.9 % (ref 11.8–14.4)
GFR SERPL CREATININE-BSD FRML MDRD: 58 ML/MIN/1.73M2
GLUCOSE SERPL-MCNC: 81 MG/DL (ref 70–99)
HCT VFR BLD AUTO: 26 % (ref 40.7–50.3)
HGB BLD-MCNC: 7.9 G/DL (ref 13–17)
MCH RBC QN AUTO: 29.5 PG (ref 25.2–33.5)
MCHC RBC AUTO-ENTMCNC: 30.4 G/DL (ref 28.4–34.8)
MCV RBC AUTO: 97 FL (ref 82.6–102.9)
NRBC BLD-RTO: 0 PER 100 WBC
PLATELET # BLD AUTO: 142 K/UL (ref 138–453)
PMV BLD AUTO: 10.3 FL (ref 8.1–13.5)
POTASSIUM SERPL-SCNC: 3.9 MMOL/L (ref 3.7–5.3)
RBC # BLD AUTO: 2.68 M/UL (ref 4.21–5.77)
SODIUM SERPL-SCNC: 132 MMOL/L (ref 135–144)
WBC OTHER # BLD: 8.2 K/UL (ref 3.5–11.3)

## 2024-02-04 PROCEDURE — 80048 BASIC METABOLIC PNL TOTAL CA: CPT

## 2024-02-04 PROCEDURE — 85027 COMPLETE CBC AUTOMATED: CPT

## 2024-02-05 ENCOUNTER — TELEPHONE (OUTPATIENT)
Dept: SURGERY | Age: 59
End: 2024-02-05

## 2024-02-05 LAB
ALBUMIN SERPL-MCNC: 2 G/DL (ref 3.5–5.2)
ALP SERPL-CCNC: 474 U/L (ref 40–129)
ALT SERPL-CCNC: 45 U/L (ref 5–41)
ANION GAP SERPL CALCULATED.3IONS-SCNC: 11 MMOL/L (ref 9–17)
AST SERPL-CCNC: 60 U/L
BILIRUB SERPL-MCNC: 0.6 MG/DL (ref 0.3–1.2)
BUN SERPL-MCNC: 23 MG/DL (ref 6–20)
BUN/CREAT SERPL: 16 (ref 9–20)
CALCIUM SERPL-MCNC: 8.5 MG/DL (ref 8.6–10.4)
CHLORIDE SERPL-SCNC: 103 MMOL/L (ref 98–107)
CO2 SERPL-SCNC: 18 MMOL/L (ref 20–31)
CREAT SERPL-MCNC: 1.4 MG/DL (ref 0.7–1.2)
ERYTHROCYTE [DISTWIDTH] IN BLOOD BY AUTOMATED COUNT: 20 % (ref 11.8–14.4)
GFR SERPL CREATININE-BSD FRML MDRD: 58 ML/MIN/1.73M2
GLUCOSE SERPL-MCNC: 84 MG/DL (ref 70–99)
HCT VFR BLD AUTO: 23.8 % (ref 40.7–50.3)
HGB BLD-MCNC: 7.4 G/DL (ref 13–17)
MAGNESIUM SERPL-MCNC: 1.7 MG/DL (ref 1.6–2.6)
MCH RBC QN AUTO: 29.8 PG (ref 25.2–33.5)
MCHC RBC AUTO-ENTMCNC: 31.1 G/DL (ref 28.4–34.8)
MCV RBC AUTO: 96 FL (ref 82.6–102.9)
NRBC BLD-RTO: 0 PER 100 WBC
PHOSPHATE SERPL-MCNC: 2.7 MG/DL (ref 2.5–4.5)
PLATELET # BLD AUTO: 129 K/UL (ref 138–453)
PMV BLD AUTO: 10 FL (ref 8.1–13.5)
POTASSIUM SERPL-SCNC: 3.9 MMOL/L (ref 3.7–5.3)
PROT SERPL-MCNC: 5.3 G/DL (ref 6.4–8.3)
RBC # BLD AUTO: 2.48 M/UL (ref 4.21–5.77)
SODIUM SERPL-SCNC: 132 MMOL/L (ref 135–144)
WBC OTHER # BLD: 8.6 K/UL (ref 3.5–11.3)

## 2024-02-05 NOTE — TELEPHONE ENCOUNTER
Spoke with Hoa Lance through [facility] in regards to patient having transport set up to [facility] Wednesday 2/7/24 for OR. She advises that transport is currently set up for tomorrow 2/6 to [facility] and she was not aware of Wednesday. I advised that we would prefer patient come to [facility] for OR on Wednesday instead of [facility] as we have all the necessary supplies ready here. She states she will see if there is availability to have him transported to [facility] on Wednesday instead. Advised OR is scheduled at 11 AM. She will call me back.

## 2024-02-06 LAB
ERYTHROCYTE [DISTWIDTH] IN BLOOD BY AUTOMATED COUNT: 20 % (ref 11.8–14.4)
HCT VFR BLD AUTO: 24.8 % (ref 40.7–50.3)
HGB BLD-MCNC: 7.4 G/DL (ref 13–17)
MCH RBC QN AUTO: 28.7 PG (ref 25.2–33.5)
MCHC RBC AUTO-ENTMCNC: 29.8 G/DL (ref 28.4–34.8)
MCV RBC AUTO: 96.1 FL (ref 82.6–102.9)
NRBC BLD-RTO: 0 PER 100 WBC
PLATELET # BLD AUTO: 136 K/UL (ref 138–453)
PMV BLD AUTO: 10.4 FL (ref 8.1–13.5)
RBC # BLD AUTO: 2.58 M/UL (ref 4.21–5.77)
WBC OTHER # BLD: 9.8 K/UL (ref 3.5–11.3)

## 2024-02-06 RX ORDER — AMIODARONE HYDROCHLORIDE 200 MG/1
200 TABLET ORAL DAILY
Status: ON HOLD | COMMUNITY

## 2024-02-06 RX ORDER — FERROUS SULFATE 300 MG/5ML
300 LIQUID (ML) ORAL DAILY
Status: ON HOLD | COMMUNITY

## 2024-02-06 RX ORDER — FOLIC ACID 1 MG/1
1 TABLET ORAL DAILY
Status: ON HOLD | COMMUNITY

## 2024-02-06 RX ORDER — OXYCODONE HYDROCHLORIDE 5 MG/1
5 TABLET ORAL EVERY 6 HOURS PRN
Status: ON HOLD | COMMUNITY
End: 2024-02-12

## 2024-02-06 NOTE — DISCHARGE SUMMARY
Surgery Discharge Summary     Patient Identification  Ruben Dimas is a 58 y.o. male.  :  1965  Admit Date:  2024    Discharge date:   2024  8:36 PM                                   Disposition: Discharge to LTAC    Discharge Diagnoses:   Patient Active Problem List   Diagnosis    Alcoholic cirrhosis of liver (HCC), sober since     Peripheral edema    Bipolar disorder (HCC)    Type 2 diabetes mellitus with diabetic neuropathy, with long-term current use of insulin (HCC)    Essential hypertension, currently hypotensive    Dyslipidemia    Weakness    Acute metabolic encephalopathy    FABI (obstructive sleep apnea)    Primary osteoarthritis of right knee    Type 2 diabetes mellitus with diabetic neuropathy (HCC)    Acquired hypothyroidism    Urine retention    Anemia    Slow transit constipation    Iron deficiency anemia due to chronic blood loss    Gastroesophageal reflux disease without esophagitis    LEONARDA (acute kidney injury) (HCC)    Secondary esophageal varices without bleeding (HCC)    Portal hypertension (HCC)    Obesity, morbid, BMI 50 or higher (HCC)    Venous stasis dermatitis of both lower extremities    Cellulitis of perineum    Chronic indwelling Clemons catheter    Anxiety and depression    Back pain, chronic    BPH (benign prostatic hyperplasia)    Chronic diastolic CHF (congestive heart failure) (HCC)    Cirrhosis (HCC)    Diabetes mellitus (HCC)    GERD (gastroesophageal reflux disease)    Hepatitis    Hiatal hernia    Hypertension, essential    IBS (irritable bowel syndrome)    Morbid obesity with BMI of 45.0-49.9, adult (HCC)    Neuropathy    Chronic respiratory failure with hypoxia, on home oxygen therapy (HCC)    Sleep apnea    Thyroid disease    Urinary bladder neurogenic dysfunction    Acute UTI    Musculoskeletal immobility    Pyocystis    Myoclonic jerking    Thrombocytopenia (HCC)    Hypotension    Sepsis with acute hypercapnic respiratory failure and septic shock (HCC)

## 2024-02-07 ENCOUNTER — ANESTHESIA (OUTPATIENT)
Dept: OPERATING ROOM | Age: 59
End: 2024-02-07
Payer: MEDICARE

## 2024-02-07 ENCOUNTER — HOSPITAL ENCOUNTER (OUTPATIENT)
Age: 59
Setting detail: OUTPATIENT SURGERY
Discharge: NURSING, REHAB OR LONG-TERM CARE | End: 2024-02-07
Attending: SURGERY | Admitting: SURGERY
Payer: MEDICARE

## 2024-02-07 ENCOUNTER — ANESTHESIA EVENT (OUTPATIENT)
Dept: OPERATING ROOM | Age: 59
End: 2024-02-07
Payer: MEDICARE

## 2024-02-07 VITALS
RESPIRATION RATE: 19 BRPM | WEIGHT: 315 LBS | SYSTOLIC BLOOD PRESSURE: 90 MMHG | OXYGEN SATURATION: 100 % | BODY MASS INDEX: 45.1 KG/M2 | HEART RATE: 78 BPM | HEIGHT: 70 IN | TEMPERATURE: 97.7 F | DIASTOLIC BLOOD PRESSURE: 52 MMHG

## 2024-02-07 PROBLEM — S31.109S CHRONIC ABDOMINAL WOUND INFECTION, SEQUELA: Status: ACTIVE | Noted: 2023-07-13

## 2024-02-07 PROBLEM — L08.9 CHRONIC ABDOMINAL WOUND INFECTION, SEQUELA: Status: ACTIVE | Noted: 2023-07-13

## 2024-02-07 LAB
ERYTHROCYTE [DISTWIDTH] IN BLOOD BY AUTOMATED COUNT: 20 % (ref 11.8–14.4)
GLUCOSE BLD-MCNC: 100 MG/DL (ref 75–110)
GLUCOSE BLD-MCNC: 77 MG/DL (ref 75–110)
HCT VFR BLD AUTO: 23.4 % (ref 40.7–50.3)
HGB BLD-MCNC: 7.4 G/DL (ref 13–17)
MCH RBC QN AUTO: 29.7 PG (ref 25.2–33.5)
MCHC RBC AUTO-ENTMCNC: 31.6 G/DL (ref 28.4–34.8)
MCV RBC AUTO: 94 FL (ref 82.6–102.9)
NRBC BLD-RTO: 0 PER 100 WBC
PLATELET # BLD AUTO: 122 K/UL (ref 138–453)
PMV BLD AUTO: 10.2 FL (ref 8.1–13.5)
RBC # BLD AUTO: 2.49 M/UL (ref 4.21–5.77)
WBC OTHER # BLD: 8.7 K/UL (ref 3.5–11.3)

## 2024-02-07 PROCEDURE — 7100000000 HC PACU RECOVERY - FIRST 15 MIN: Performed by: SURGERY

## 2024-02-07 PROCEDURE — 3600000014 HC SURGERY LEVEL 4 ADDTL 15MIN: Performed by: SURGERY

## 2024-02-07 PROCEDURE — 3700000000 HC ANESTHESIA ATTENDED CARE: Performed by: SURGERY

## 2024-02-07 PROCEDURE — 2709999900 HC NON-CHARGEABLE SUPPLY: Performed by: SURGERY

## 2024-02-07 PROCEDURE — 82947 ASSAY GLUCOSE BLOOD QUANT: CPT

## 2024-02-07 PROCEDURE — 6360000002 HC RX W HCPCS: Performed by: ANESTHESIOLOGY

## 2024-02-07 PROCEDURE — 7100000010 HC PHASE II RECOVERY - FIRST 15 MIN: Performed by: SURGERY

## 2024-02-07 PROCEDURE — 2580000003 HC RX 258: Performed by: ANESTHESIOLOGY

## 2024-02-07 PROCEDURE — 97606 NEG PRS WND THER DME>50 SQCM: CPT | Performed by: SURGERY

## 2024-02-07 PROCEDURE — 7100000001 HC PACU RECOVERY - ADDTL 15 MIN: Performed by: SURGERY

## 2024-02-07 PROCEDURE — 3700000001 HC ADD 15 MINUTES (ANESTHESIA): Performed by: SURGERY

## 2024-02-07 PROCEDURE — 6360000002 HC RX W HCPCS: Performed by: NURSE ANESTHETIST, CERTIFIED REGISTERED

## 2024-02-07 PROCEDURE — 2500000003 HC RX 250 WO HCPCS: Performed by: NURSE ANESTHETIST, CERTIFIED REGISTERED

## 2024-02-07 PROCEDURE — 99024 POSTOP FOLLOW-UP VISIT: CPT | Performed by: SURGERY

## 2024-02-07 PROCEDURE — 7100000011 HC PHASE II RECOVERY - ADDTL 15 MIN: Performed by: SURGERY

## 2024-02-07 PROCEDURE — 15003 WOUND PREP ADDL 100 CM: CPT | Performed by: SURGERY

## 2024-02-07 PROCEDURE — 15002 WOUND PREP TRK/ARM/LEG: CPT | Performed by: SURGERY

## 2024-02-07 PROCEDURE — 15274 SKN SUB GRFT T/A/L CHILD ADD: CPT | Performed by: SURGERY

## 2024-02-07 PROCEDURE — 15273 SKIN SUB GRFT T/ARM/LG CHILD: CPT | Performed by: SURGERY

## 2024-02-07 PROCEDURE — 3600000004 HC SURGERY LEVEL 4 BASE: Performed by: SURGERY

## 2024-02-07 PROCEDURE — 2720000010 HC SURG SUPPLY STERILE: Performed by: SURGERY

## 2024-02-07 DEVICE — DRESSING WND MICRONIZED PARTIC 1000 MG MATRISTEM MICROMATRIX: Type: IMPLANTABLE DEVICE | Site: ABDOMEN | Status: FUNCTIONAL

## 2024-02-07 RX ORDER — FENTANYL CITRATE 50 UG/ML
50 INJECTION, SOLUTION INTRAMUSCULAR; INTRAVENOUS EVERY 5 MIN PRN
Status: DISCONTINUED | OUTPATIENT
Start: 2024-02-07 | End: 2024-02-07 | Stop reason: HOSPADM

## 2024-02-07 RX ORDER — FENTANYL CITRATE 50 UG/ML
25 INJECTION, SOLUTION INTRAMUSCULAR; INTRAVENOUS EVERY 5 MIN PRN
Status: DISCONTINUED | OUTPATIENT
Start: 2024-02-07 | End: 2024-02-07 | Stop reason: HOSPADM

## 2024-02-07 RX ORDER — FENTANYL CITRATE 50 UG/ML
25 INJECTION, SOLUTION INTRAMUSCULAR; INTRAVENOUS
Status: DISCONTINUED | OUTPATIENT
Start: 2024-02-07 | End: 2024-02-07 | Stop reason: HOSPADM

## 2024-02-07 RX ORDER — SCOLOPAMINE TRANSDERMAL SYSTEM 1 MG/1
1 PATCH, EXTENDED RELEASE TRANSDERMAL ONCE
Status: DISCONTINUED | OUTPATIENT
Start: 2024-02-07 | End: 2024-02-07 | Stop reason: HOSPADM

## 2024-02-07 RX ORDER — SODIUM CHLORIDE, SODIUM LACTATE, POTASSIUM CHLORIDE, CALCIUM CHLORIDE 600; 310; 30; 20 MG/100ML; MG/100ML; MG/100ML; MG/100ML
INJECTION, SOLUTION INTRAVENOUS CONTINUOUS
Status: DISCONTINUED | OUTPATIENT
Start: 2024-02-07 | End: 2024-02-07 | Stop reason: HOSPADM

## 2024-02-07 RX ORDER — FENTANYL CITRATE 50 UG/ML
50 INJECTION, SOLUTION INTRAMUSCULAR; INTRAVENOUS
Status: DISCONTINUED | OUTPATIENT
Start: 2024-02-07 | End: 2024-02-07 | Stop reason: HOSPADM

## 2024-02-07 RX ORDER — ONDANSETRON 2 MG/ML
INJECTION INTRAMUSCULAR; INTRAVENOUS PRN
Status: DISCONTINUED | OUTPATIENT
Start: 2024-02-07 | End: 2024-02-07 | Stop reason: SDUPTHER

## 2024-02-07 RX ORDER — PROPOFOL 10 MG/ML
INJECTION, EMULSION INTRAVENOUS PRN
Status: DISCONTINUED | OUTPATIENT
Start: 2024-02-07 | End: 2024-02-07 | Stop reason: SDUPTHER

## 2024-02-07 RX ORDER — ONDANSETRON 2 MG/ML
4 INJECTION INTRAMUSCULAR; INTRAVENOUS
Status: DISCONTINUED | OUTPATIENT
Start: 2024-02-07 | End: 2024-02-07 | Stop reason: HOSPADM

## 2024-02-07 RX ORDER — SODIUM CHLORIDE 0.9 % (FLUSH) 0.9 %
5-40 SYRINGE (ML) INJECTION PRN
Status: DISCONTINUED | OUTPATIENT
Start: 2024-02-07 | End: 2024-02-07 | Stop reason: HOSPADM

## 2024-02-07 RX ORDER — ROCURONIUM BROMIDE 10 MG/ML
INJECTION, SOLUTION INTRAVENOUS PRN
Status: DISCONTINUED | OUTPATIENT
Start: 2024-02-07 | End: 2024-02-07 | Stop reason: SDUPTHER

## 2024-02-07 RX ORDER — ALBUTEROL SULFATE 90 UG/1
2 AEROSOL, METERED RESPIRATORY (INHALATION) EVERY 6 HOURS PRN
Status: DISCONTINUED | OUTPATIENT
Start: 2024-02-07 | End: 2024-02-07 | Stop reason: HOSPADM

## 2024-02-07 RX ORDER — MIDAZOLAM HYDROCHLORIDE 2 MG/2ML
1 INJECTION, SOLUTION INTRAMUSCULAR; INTRAVENOUS EVERY 10 MIN PRN
Status: DISCONTINUED | OUTPATIENT
Start: 2024-02-07 | End: 2024-02-07 | Stop reason: HOSPADM

## 2024-02-07 RX ORDER — DIPHENHYDRAMINE HYDROCHLORIDE 50 MG/ML
12.5 INJECTION INTRAMUSCULAR; INTRAVENOUS
Status: DISCONTINUED | OUTPATIENT
Start: 2024-02-07 | End: 2024-02-07 | Stop reason: HOSPADM

## 2024-02-07 RX ORDER — DIPHENHYDRAMINE HYDROCHLORIDE 50 MG/ML
INJECTION INTRAMUSCULAR; INTRAVENOUS PRN
Status: DISCONTINUED | OUTPATIENT
Start: 2024-02-07 | End: 2024-02-07 | Stop reason: SDUPTHER

## 2024-02-07 RX ORDER — ALBUTEROL SULFATE 2.5 MG/3ML
2.5 SOLUTION RESPIRATORY (INHALATION) EVERY 8 HOURS PRN
Status: DISCONTINUED | OUTPATIENT
Start: 2024-02-07 | End: 2024-02-07 | Stop reason: HOSPADM

## 2024-02-07 RX ORDER — SODIUM CHLORIDE 9 MG/ML
INJECTION, SOLUTION INTRAVENOUS PRN
Status: DISCONTINUED | OUTPATIENT
Start: 2024-02-07 | End: 2024-02-07 | Stop reason: HOSPADM

## 2024-02-07 RX ORDER — SODIUM CHLORIDE 0.9 % (FLUSH) 0.9 %
5-40 SYRINGE (ML) INJECTION EVERY 12 HOURS SCHEDULED
Status: DISCONTINUED | OUTPATIENT
Start: 2024-02-07 | End: 2024-02-07 | Stop reason: HOSPADM

## 2024-02-07 RX ORDER — FENTANYL CITRATE 50 UG/ML
INJECTION, SOLUTION INTRAMUSCULAR; INTRAVENOUS PRN
Status: DISCONTINUED | OUTPATIENT
Start: 2024-02-07 | End: 2024-02-07 | Stop reason: SDUPTHER

## 2024-02-07 RX ADMIN — PROPOFOL 200 MG: 10 INJECTION, EMULSION INTRAVENOUS at 11:42

## 2024-02-07 RX ADMIN — FENTANYL CITRATE 50 MCG: 50 INJECTION, SOLUTION INTRAMUSCULAR; INTRAVENOUS at 13:29

## 2024-02-07 RX ADMIN — DIPHENHYDRAMINE HYDROCHLORIDE 12.5 MG: 50 INJECTION INTRAMUSCULAR; INTRAVENOUS at 12:14

## 2024-02-07 RX ADMIN — PHENYLEPHRINE HYDROCHLORIDE 200 MCG: 10 INJECTION INTRAVENOUS at 11:53

## 2024-02-07 RX ADMIN — ONDANSETRON 4 MG: 2 INJECTION INTRAMUSCULAR; INTRAVENOUS at 14:09

## 2024-02-07 RX ADMIN — PHENYLEPHRINE HYDROCHLORIDE 100 MCG: 10 INJECTION INTRAVENOUS at 12:09

## 2024-02-07 RX ADMIN — HYDROMORPHONE HYDROCHLORIDE 0.5 MG: 1 INJECTION, SOLUTION INTRAMUSCULAR; INTRAVENOUS; SUBCUTANEOUS at 15:08

## 2024-02-07 RX ADMIN — SODIUM CHLORIDE, POTASSIUM CHLORIDE, SODIUM LACTATE AND CALCIUM CHLORIDE: 600; 310; 30; 20 INJECTION, SOLUTION INTRAVENOUS at 11:36

## 2024-02-07 RX ADMIN — FENTANYL CITRATE 50 MCG: 50 INJECTION, SOLUTION INTRAMUSCULAR; INTRAVENOUS at 13:53

## 2024-02-07 RX ADMIN — ROCURONIUM BROMIDE 50 MG: 10 INJECTION, SOLUTION INTRAVENOUS at 11:52

## 2024-02-07 RX ADMIN — HYDROMORPHONE HYDROCHLORIDE 0.5 MG: 1 INJECTION, SOLUTION INTRAMUSCULAR; INTRAVENOUS; SUBCUTANEOUS at 14:47

## 2024-02-07 RX ADMIN — ROCURONIUM BROMIDE 30 MG: 10 INJECTION, SOLUTION INTRAVENOUS at 12:08

## 2024-02-07 RX ADMIN — HYDROMORPHONE HYDROCHLORIDE 0.5 MG: 1 INJECTION, SOLUTION INTRAMUSCULAR; INTRAVENOUS; SUBCUTANEOUS at 15:32

## 2024-02-07 RX ADMIN — FENTANYL CITRATE 50 MCG: 50 INJECTION, SOLUTION INTRAMUSCULAR; INTRAVENOUS at 12:03

## 2024-02-07 RX ADMIN — SODIUM CHLORIDE, POTASSIUM CHLORIDE, SODIUM LACTATE AND CALCIUM CHLORIDE: 600; 310; 30; 20 INJECTION, SOLUTION INTRAVENOUS at 13:52

## 2024-02-07 RX ADMIN — SUGAMMADEX 300 MG: 100 INJECTION, SOLUTION INTRAVENOUS at 14:26

## 2024-02-07 RX ADMIN — FENTANYL CITRATE 50 MCG: 50 INJECTION, SOLUTION INTRAMUSCULAR; INTRAVENOUS at 12:32

## 2024-02-07 ASSESSMENT — PAIN SCALES - GENERAL
PAINLEVEL_OUTOF10: 7
PAINLEVEL_OUTOF10: 7
PAINLEVEL_OUTOF10: 4
PAINLEVEL_OUTOF10: 7

## 2024-02-07 ASSESSMENT — PAIN DESCRIPTION - LOCATION
LOCATION: ABDOMEN
LOCATION: ABDOMEN

## 2024-02-07 ASSESSMENT — PAIN - FUNCTIONAL ASSESSMENT: PAIN_FUNCTIONAL_ASSESSMENT: 0-10

## 2024-02-07 ASSESSMENT — PAIN DESCRIPTION - DESCRIPTORS: DESCRIPTORS: DULL

## 2024-02-07 NOTE — BRIEF OP NOTE
Brief Postoperative Note      Patient: Ruben Dimas  YOB: 1965  MRN: [id number]    Date of Procedure: 2/12/2024    Pre-Op Diagnosis Codes:     * Open wound of abdomen, initial encounter [S31.109A]    Post-Op Diagnosis: Same         Procedure    Wound preparation with misonix [id number]= 1140sq cm  Application of skin substitute [id number]= 1140q cm  Negative pressure wound therapy > 50 cm      Surgeon(s):  Escobar Ulloa MD    Assistant:  * No surgical staff found *    Anesthesia: General    Estimated Blood Loss (mL): Minimal    Complications: None    Specimens:   * No specimens in log *    Implants:  * No surgical log found *      Drains:   Negative Pressure Wound Therapy Abdomen Medial;Upper (Active)   Wound Type Surgical 01/31/24 1315   Dressing Type Black Foam 01/31/24 1315   Number of pieces used 1 01/29/24 2356   Cycle Continuous 01/31/24 1315   Target Pressure (mmHg) 125 01/31/24 1315   Canister changed? No 01/31/24 1315   Dressing Status Clean, dry & intact 01/31/24 1315   Dressing Changed Dressing reinforced 01/31/24 1315   Drainage Amount None 01/31/24 1315   Drainage Description Serosanguinous 01/31/24 1315   Dressing Change Due 01/31/24 01/31/24 1315   Output (ml) 25 ml 01/31/24 0400   Wound Assessment Other (Comment) 01/31/24 1315   Yokasta-wound Assessment Fragile 01/31/24 1315   Odor None 01/28/24 0300       Negative Pressure Wound Therapy Abdomen Lower;Medial;Right (Active)   Wound Type Surgical 01/31/24 1315   Dressing Type Black Foam 01/31/24 1315   Cycle Continuous 01/31/24 1315   Target Pressure (mmHg) 125 01/31/24 1315   Canister changed? No 01/31/24 1315   Dressing Status Clean, dry & intact 01/31/24 1315   Dressing Changed Dressing reinforced 01/31/24 1315   Drainage Amount None 01/31/24 1315   Drainage Description Sanguinous 01/31/24 1315   Dressing Change Due 01/31/24 01/31/24 1315   Output (ml) 75 ml 01/31/24 0400   Yokasta-wound Assessment Other (Comment) 01/30/24 1355      (Removed)       [REMOVED] Colostomy RLQ  (Removed)       [REMOVED] Urostomy RLQ (Removed)   Stoma  Assessment Other (Comment) 01/20/24 0429   Collection Container Standard 01/20/24 0429       [REMOVED] Urostomy  RLQ (Removed)   Collection Container Standard 01/24/24 0800   Output (ml) 0 ml 01/24/24 0502       [REMOVED] Urinary Catheter Clemons (Removed)   Catheter Indications Urinary retention (acute or chronic), continuous bladder irrigation or bladder outlet obstruction 01/02/24 2000   Site Assessment Pink 01/02/24 2000   Urine Color Carisa 01/02/24 2000   Urine Appearance Clear 01/02/24 2000   Collection Container Standard 01/02/24 2000   Securement Method Securing device (Describe) 01/02/24 2000   Catheter Care  Soap and water 01/02/24 0800   Catheter Best Practices  Drainage tube clipped to bed;Catheter secured to thigh;Tamper seal intact;Bag below bladder;Bag not on floor;Lack of dependent loop in tubing;Drainage bag less than half full 01/02/24 2000   Status Draining 01/02/24 2000   Output (mL) 75 mL 01/02/24 1856       [REMOVED] Urinary Catheter 01/02/24 2 Way (Removed)   Catheter Indications Urinary retention (acute or chronic), continuous bladder irrigation or bladder outlet obstruction 01/10/24 0800   Site Assessment No urethral drainage 01/10/24 0800   Urine Color Yellow 01/10/24 1200   Urine Appearance Clear 01/10/24 1200   Collection Container Standard 01/10/24 1200   Securement Method Securing device (Describe) 01/10/24 1200   Catheter Care  Soap and water 01/10/24 1200   Catheter Best Practices  Drainage tube clipped to bed;Tamper seal intact;Bag below bladder;Bag not on floor;Lack of dependent loop in tubing;Drainage bag less than half full;Catheter secured to thigh 01/10/24 1200   Status Draining 01/10/24 0400   Output (mL) 50 mL 01/10/24 1600       [REMOVED] Urinary Catheter 01/14/24 Clemons-Temperature (Removed)   Catheter Indications Urinary retention (acute or chronic), continuous bladder irrigation

## 2024-02-07 NOTE — ANESTHESIA PRE PROCEDURE
Department of Anesthesiology  Preprocedure Note       Name:  Ruben Dimas   Age:  58 y.o.  :  1965                                          MRN:  [id number]         Date:  2024      Surgeon: Surgeon(s):  Escobar Ulloa MD    Procedure: Procedure(s):  ABDOMINAL WOUND PREPARATION, APPLICATION  SKIN SUBSITUTE, WOUND VAC PLACEMENT  (MISONIX)    Medications prior to admission:   Prior to Admission medications    Medication Sig Start Date End Date Taking? Authorizing Provider   CITALOPRAM HYDROBROMIDE PO Take 30 mg by mouth daily   Yes Diane Sorensen MD   ferrous Sulfate 300 (60 Fe) MG/5ML solution Take 5 mLs by mouth daily   Yes Diane Sorensen MD   folic acid (FOLVITE) 1 MG tablet Take 1 tablet by mouth daily   Yes Diane Sorensen MD   pantoprazole (PROTONIX) 2 mg/mL SUSP oral suspension Take 20 mLs by mouth daily   Yes Diane Sorensen MD   oxyCODONE (ROXICODONE) 5 MG immediate release tablet Take 1 tablet by mouth every 6 hours as needed for Pain. 5-10 mg Max Daily Amount: 20 mg   Yes Diane Sorensen MD   INSULIN LISPRO SC Inject into the skin 4 times daily (before meals and nightly) Sliding scale 0-16 units , scale starts at blood sugar greater than 10   Yes Diane Sorensen MD   amiodarone (CORDARONE) 200 MG tablet Take 1 tablet by mouth daily   Yes Diane Sorensen MD   vitamin D3 (CHOLECALCIFEROL) 125 MCG (5000 UT) TABS tablet Take 1 tablet by mouth daily 23   Diane Sorensen MD   heparin, porcine, 5000 UNIT/ML injection Inject 1.5 mLs into the skin every 8 hours    Diane Sorensen MD   senna (SENOKOT) 8.6 MG tablet Take 1 tablet by mouth 2 times daily    Diane Sorensen MD   ALPRAZolam (XANAX) 0.25 MG tablet Take 1 tablet by mouth nightly as needed for Anxiety.    Diane Sorensen MD   glucose (GLUTOSE) 40 % GEL Take 37.5 mLs by mouth as needed (For blood sugar <70)    Diane Sorensen MD   HYDROmorphone (DILAUDID) 1 MG/ML

## 2024-02-07 NOTE — H&P
Update History & Physical    The patient's History and Physical of January 31, 2024 was reviewed with the patient and I examined the patient. There was no change. The surgical site was confirmed by the patient and me.       Plan: OR for elective wound care, wound bed prep, skin substitute application, and wound vac exchange.     The risks, benefits, expected outcome, and alternative to the recommended procedure have been discussed with the patient. Patient understands and wants to proceed with the procedure.     Electronically signed by Nayla Thompson DO on 2/7/2024 at 11:31 AM                PROGRESS NOTE          PATIENT NAME: Ruben Dimas  MEDICAL RECORD NO. [id number]  DATE: 2/7/2024  SURGEON: Jake  PRIMARY CARE PHYSICIAN: Ramy Lazo MD    HD: # 0    ASSESSMENT    Patient Active Problem List   Diagnosis    Alcoholic cirrhosis of liver (HCC), sober since 2013    Peripheral edema    Bipolar disorder (HCC)    Type 2 diabetes mellitus with diabetic neuropathy, with long-term current use of insulin (HCC)    Essential hypertension, currently hypotensive    Dyslipidemia    Weakness    Acute metabolic encephalopathy    FABI (obstructive sleep apnea)    Primary osteoarthritis of right knee    Type 2 diabetes mellitus with diabetic neuropathy (HCC)    Acquired hypothyroidism    Urine retention    Anemia    Slow transit constipation    Iron deficiency anemia due to chronic blood loss    Gastroesophageal reflux disease without esophagitis    LEONARDA (acute kidney injury) (HCC)    Secondary esophageal varices without bleeding (HCC)    Portal hypertension (HCC)    Obesity, morbid, BMI 50 or higher (HCC)    Venous stasis dermatitis of both lower extremities    Cellulitis of perineum    Chronic indwelling Clemons catheter    Anxiety and depression    Back pain, chronic    BPH (benign prostatic hyperplasia)    Chronic diastolic CHF (congestive heart failure) (HCC)    Cirrhosis (HCC)    Diabetes mellitus (HCC)    GERD

## 2024-02-07 NOTE — OP NOTE
Operative Note      Patient: Ruben Dimas  YOB: 1965  MRN: [id number]    Date of Procedure: 2/12/2024    Pre-Op Diagnosis Codes:     * Open wound of abdomen, initial encounter [S31.109A]    Post-Op Diagnosis: Same     Procedure  Wound preparation with misonix [id number]= 1140sq cm  Application of skin substitute [id number]= 1140q cm  Negative pressure wound therapy > 50 cm    Surgeon(s):  Escobar Ulloa MD    Assistant:   * No surgical staff found *    Anesthesia: General    Estimated Blood Loss (mL): Minimal    Complications: None    Specimens:   * No specimens in log *    Implants:  * No surgical log found *      Drains:   Negative Pressure Wound Therapy Abdomen Medial;Upper (Active)   Wound Type Surgical 01/31/24 1315   Dressing Type Black Foam 01/31/24 1315   Number of pieces used 1 01/29/24 2356   Cycle Continuous 01/31/24 1315   Target Pressure (mmHg) 125 01/31/24 1315   Canister changed? No 01/31/24 1315   Dressing Status Clean, dry & intact 01/31/24 1315   Dressing Changed Dressing reinforced 01/31/24 1315   Drainage Amount None 01/31/24 1315   Drainage Description Serosanguinous 01/31/24 1315   Dressing Change Due 01/31/24 01/31/24 1315   Output (ml) 25 ml 01/31/24 0400   Wound Assessment Other (Comment) 01/31/24 1315   Yokasta-wound Assessment Fragile 01/31/24 1315   Odor None 01/28/24 0300       Negative Pressure Wound Therapy Abdomen Lower;Medial;Right (Active)   Wound Type Surgical 01/31/24 1315   Dressing Type Black Foam 01/31/24 1315   Cycle Continuous 01/31/24 1315   Target Pressure (mmHg) 125 01/31/24 1315   Canister changed? No 01/31/24 1315   Dressing Status Clean, dry & intact 01/31/24 1315   Dressing Changed Dressing reinforced 01/31/24 1315   Drainage Amount None 01/31/24 1315   Drainage Description Sanguinous 01/31/24 1315   Dressing Change Due 01/31/24 01/31/24 1315   Output (ml) 75 ml 01/31/24 0400   Yokasta-wound Assessment Other (Comment) 01/30/24 1355      (Removed)       [REMOVED] Colostomy RLQ  (Removed)       [REMOVED] Urostomy RLQ (Removed)   Stoma  Assessment Other (Comment) 01/20/24 0429   Collection Container Standard 01/20/24 0429       [REMOVED] Urostomy  RLQ (Removed)   Collection Container Standard 01/24/24 0800   Output (ml) 0 ml 01/24/24 0502       [REMOVED] Urinary Catheter Clemons (Removed)   Catheter Indications Urinary retention (acute or chronic), continuous bladder irrigation or bladder outlet obstruction 01/02/24 2000   Site Assessment Pink 01/02/24 2000   Urine Color Carisa 01/02/24 2000   Urine Appearance Clear 01/02/24 2000   Collection Container Standard 01/02/24 2000   Securement Method Securing device (Describe) 01/02/24 2000   Catheter Care  Soap and water 01/02/24 0800   Catheter Best Practices  Drainage tube clipped to bed;Catheter secured to thigh;Tamper seal intact;Bag below bladder;Bag not on floor;Lack of dependent loop in tubing;Drainage bag less than half full 01/02/24 2000   Status Draining 01/02/24 2000   Output (mL) 75 mL 01/02/24 1856       [REMOVED] Urinary Catheter 01/02/24 [address] Way (Removed)   Catheter Indications Urinary retention (acute or chronic), continuous bladder irrigation or bladder outlet obstruction 01/10/24 0800   Site Assessment No urethral drainage 01/10/24 0800   Urine Color Yellow 01/10/24 1200   Urine Appearance Clear 01/10/24 1200   Collection Container Standard 01/10/24 1200   Securement Method Securing device (Describe) 01/10/24 1200   Catheter Care  Soap and water 01/10/24 1200   Catheter Best Practices  Drainage tube clipped to bed;Tamper seal intact;Bag below bladder;Bag not on floor;Lack of dependent loop in tubing;Drainage bag less than half full;Catheter secured to thigh 01/10/24 1200   Status Draining 01/10/24 0400   Output (mL) 50 mL 01/10/24 1600       [REMOVED] Urinary Catheter 01/14/24 Clemons-Temperature (Removed)   Catheter Indications Urinary retention (acute or chronic), continuous bladder irrigation  or bladder outlet obstruction 01/31/24 1315   Site Assessment Other (Comment) 01/31/24 1315   Urine Color Yellow 01/31/24 1315   Urine Appearance Cloudy 01/31/24 1315   Urine Odor Malodorous 01/31/24 1315   Collection Container Standard 01/31/24 1315   Securement Method Securing device (Describe) 01/31/24 1315   Catheter Care  Soap and water 01/29/24 1115   Catheter Best Practices  Drainage tube clipped to bed;Catheter secured to thigh;Tamper seal intact;Bag below bladder;Bag not on floor;Lack of dependent loop in tubing;Drainage bag less than half full 01/31/24 1315   Status Draining;Patent 01/31/24 1315   Output (mL) 500 mL 01/31/24 1245       [REMOVED] External Urinary Catheter (Removed)   Site Assessment Clean,dry & intact 01/11/24 0800   Placement Replaced 01/11/24 0800   Securement Method Securing device (Describe) 01/11/24 0800   Catheter Care Catheter/Wick replaced;Suction Canister/Tubing changed 01/11/24 0800   Perineal Care Yes 01/11/24 0800   Suction 40 mmgHg continuous 01/11/24 0800   Urine Color Yellow 01/11/24 0800   Urine Appearance Clear 01/11/24 0800       [REMOVED] External Urinary Catheter (Removed)   Site Assessment Intact 01/13/24 0528   Placement Replaced 01/13/24 0528   Securement Method Tape 01/13/24 0528   Catheter Care Suction Canister/Tubing changed 01/13/24 0528   Perineal Care Yes 01/13/24 0528   Suction 40 mmgHg continuous 01/13/24 0528   Urine Color Yellow 01/13/24 0528   Urine Appearance Clear 01/13/24 0528   Output (mL) 200 mL 01/14/24 1200       Findings: no s/x of infection, good granulation tissue        Detailed Description of Procedure:   The patient was brought into the operating room and transferred to the OR table in supine position. General anesthesia was induced and he was oxygenated via the tracheostomy tube. A time out was performed ensuring correct patient, positioning, procedure, medications. The old abdominal wound vac was removed. The area was prepped and draped in

## 2024-02-07 NOTE — PROGRESS NOTES
Spoke to [facility] the earliest they will have 2 trucks available is 1045 pm.  Because of him being a bariatric patient they need 2 crews

## 2024-02-07 NOTE — PROGRESS NOTES
Handover and instructions given to Kerry GROVES ( [facility]), all concerns and questions were addressed, both verbalizes understanding. Patient taken by EMS transport going to [facility] in a stable condition.

## 2024-02-07 NOTE — ANESTHESIA POSTPROCEDURE EVALUATION
POST- ANESTHESIA EVALUATION       Pt Name: Rbuen Dimas  MRN: [id number]  YOB: 1965  Date of evaluation: 2/7/2024  Time:  5:21 PM      BP (!) 90/52   Pulse 78   Temp 97.7 °F (36.5 °C) (Temporal)   Resp 19   Ht 1.778 m (5' 10\")   Wt (!) 143.8 kg (317 lb)   SpO2 100%   BMI 45.48 kg/m²      Consciousness Level  Awake  Cardiopulmonary Status  Stable  Pain Adequately Treated YES  Nausea / Vomiting  NO  Adequate Hydration  YES  Anesthesia Related Complications NONE      Electronically signed by Abelino Christensen MD on 2/7/2024 at 5:21 PM     Department of Anesthesiology  Postprocedure Note    Patient: Ruben Dimas  MRN: [id number]  YOB: 1965  Date of evaluation: 2/7/2024    Procedure Summary       Date: 02/07/24 Room / Location: [address]    Anesthesia Start: 1139 Anesthesia Stop: 1448    Procedure: ABDOMINAL WOUND PREPARATION, APPLICATION  SKIN SUBSITUTE, WOUND VAC PLACEMENT  (MISONIX) Diagnosis:       Open wound of abdomen, initial encounter      (Open wound of abdomen, initial encounter [S31.109A])    Surgeons: Escobar Ulloa MD Responsible Provider: Abelino Christensen MD    Anesthesia Type: general ASA Status: 4            Anesthesia Type: No value filed.    Des Phase I: Des Score: 9    Des Phase II: Des Score: 9    Anesthesia Post Evaluation    No notable events documented.

## 2024-02-07 NOTE — DISCHARGE INSTRUCTIONS
Patient Discharge Instructions  Discharge Date:  2/7/2024    HYGEINE: Ok to shower, no soaking in a tub/pool until seen at your follow up appointment. Clean incision daily with gentle soap and water, do not use alcohol/peroxide or any harsh cleansers.    WOUND VAC: Keep suction to 125 mmHg. If it loses suction, try trouble shooting and apply additional transparent dressing. If unable to fix, call the clinic to discuss    DRIVING: No driving while taking narcotic medications or while in pain    ACTIVITY: No lifting greater than 15lbs for 6 weeks or any strenuous activity.     DIET: Resume your normal diet as advised by your PCP.    MEDICATIONS: Take all medications as prescribed.     SPECIAL INSTRUCTIONS:     Follow up with Dr. Ulloa at your scheduled wound vac exchange/operative vac exchange. Call the clinic for appointment. Call sooner if fever above 100.4 degrees Farenheit, increase in swelling or redness, thick purulent discharge or pain not controlled with medications.

## 2024-02-08 LAB
ALBUMIN SERPL-MCNC: 2 G/DL (ref 3.5–5.2)
ALP SERPL-CCNC: 591 U/L (ref 40–129)
ALT SERPL-CCNC: 43 U/L (ref 5–41)
ANION GAP SERPL CALCULATED.3IONS-SCNC: 11 MMOL/L (ref 9–17)
AST SERPL-CCNC: 58 U/L
BILIRUB SERPL-MCNC: 0.8 MG/DL (ref 0.3–1.2)
BUN SERPL-MCNC: 18 MG/DL (ref 6–20)
BUN/CREAT SERPL: 12 (ref 9–20)
CALCIUM SERPL-MCNC: 8.7 MG/DL (ref 8.6–10.4)
CHLORIDE SERPL-SCNC: 102 MMOL/L (ref 98–107)
CO2 SERPL-SCNC: 19 MMOL/L (ref 20–31)
CREAT SERPL-MCNC: 1.5 MG/DL (ref 0.7–1.2)
ERYTHROCYTE [DISTWIDTH] IN BLOOD BY AUTOMATED COUNT: 19.9 % (ref 11.8–14.4)
GFR SERPL CREATININE-BSD FRML MDRD: 53 ML/MIN/1.73M2
GLUCOSE SERPL-MCNC: 128 MG/DL (ref 70–99)
HCT VFR BLD AUTO: 24 % (ref 40.7–50.3)
HGB BLD-MCNC: 7.4 G/DL (ref 13–17)
MCH RBC QN AUTO: 29.2 PG (ref 25.2–33.5)
MCHC RBC AUTO-ENTMCNC: 30.8 G/DL (ref 28.4–34.8)
MCV RBC AUTO: 94.9 FL (ref 82.6–102.9)
NRBC BLD-RTO: 0 PER 100 WBC
PLATELET # BLD AUTO: 92 K/UL (ref 138–453)
PMV BLD AUTO: 10.5 FL (ref 8.1–13.5)
POTASSIUM SERPL-SCNC: 4.2 MMOL/L (ref 3.7–5.3)
PROT SERPL-MCNC: 5.2 G/DL (ref 6.4–8.3)
RBC # BLD AUTO: 2.53 M/UL (ref 4.21–5.77)
SODIUM SERPL-SCNC: 132 MMOL/L (ref 135–144)
WBC OTHER # BLD: 15.3 K/UL (ref 3.5–11.3)

## 2024-02-08 RX ORDER — CEFTOLOZANE AND TAZOBACTAM 1; .5 G/10ML; G/10ML
1500 INJECTION, POWDER, LYOPHILIZED, FOR SOLUTION INTRAVENOUS EVERY 8 HOURS
COMMUNITY
End: 2024-02-14 | Stop reason: ALTCHOICE

## 2024-02-09 ENCOUNTER — TELEPHONE (OUTPATIENT)
Dept: SURGERY | Age: 59
End: 2024-02-09

## 2024-02-09 LAB
ANION GAP SERPL CALCULATED.3IONS-SCNC: 11 MMOL/L (ref 9–17)
BUN SERPL-MCNC: 19 MG/DL (ref 6–20)
BUN/CREAT SERPL: 11 (ref 9–20)
CALCIUM SERPL-MCNC: 8.7 MG/DL (ref 8.6–10.4)
CHLORIDE SERPL-SCNC: 102 MMOL/L (ref 98–107)
CO2 SERPL-SCNC: 18 MMOL/L (ref 20–31)
CREAT SERPL-MCNC: 1.8 MG/DL (ref 0.7–1.2)
ERYTHROCYTE [DISTWIDTH] IN BLOOD BY AUTOMATED COUNT: 19.7 % (ref 11.8–14.4)
GFR SERPL CREATININE-BSD FRML MDRD: 43 ML/MIN/1.73M2
GLUCOSE SERPL-MCNC: 89 MG/DL (ref 70–99)
HCT VFR BLD AUTO: 22.3 % (ref 40.7–50.3)
HGB BLD-MCNC: 7 G/DL (ref 13–17)
MCH RBC QN AUTO: 29.7 PG (ref 25.2–33.5)
MCHC RBC AUTO-ENTMCNC: 31.4 G/DL (ref 28.4–34.8)
MCV RBC AUTO: 94.5 FL (ref 82.6–102.9)
NRBC BLD-RTO: 0 PER 100 WBC
PLATELET # BLD AUTO: 122 K/UL (ref 138–453)
PMV BLD AUTO: 9.5 FL (ref 8.1–13.5)
POTASSIUM SERPL-SCNC: 4.2 MMOL/L (ref 3.7–5.3)
RBC # BLD AUTO: 2.36 M/UL (ref 4.21–5.77)
SODIUM SERPL-SCNC: 131 MMOL/L (ref 135–144)
WBC OTHER # BLD: 13.1 K/UL (ref 3.5–11.3)

## 2024-02-09 NOTE — PROGRESS NOTES
Per Marielena/ Dr. Ulloa, Ok to proceed with OR 2-12-24 if ASA and Heparin are stopped today. Writer instructed Jessica/ [address] to hold ASA and Heparin today.

## 2024-02-09 NOTE — OP NOTE
Operative Note      Patient: Ruben Dimas  YOB: 1965  MRN: [id number]    Date of Procedure: 2/7/2024    Pre-Op Diagnosis Codes:     * Open wound of abdomen       Post-Op Diagnosis: Same     Procedure  Wound preparation with misonix [id number]= 1140sq cm  Application of skin substitute [id number]= 1140q cm  Negative pressure wound therapy > 50 cm     Surgeon(s):  Escobar Ulloa MD     Assistant:   * No surgical staff found *     Anesthesia: General     Estimated Blood Loss (mL): Minimal     Complications: None     Specimens:   * No specimens in log *     Implants:  * No surgical log found *      Drains:        Negative Pressure Wound Therapy Abdomen Medial;Upper (Active)   Wound Type Surgical 01/31/24 1315   Dressing Type Black Foam 01/31/24 1315   Number of pieces used 1 01/29/24 2356   Cycle Continuous 01/31/24 1315   Target Pressure (mmHg) 125 01/31/24 1315   Canister changed? No 01/31/24 1315   Dressing Status Clean, dry & intact 01/31/24 1315   Dressing Changed Dressing reinforced 01/31/24 1315   Drainage Amount None 01/31/24 1315   Drainage Description Serosanguinous 01/31/24 1315   Dressing Change Due 01/31/24 01/31/24 1315   Output (ml) 25 ml 01/31/24 0400   Wound Assessment Other (Comment) 01/31/24 1315   Yokasta-wound Assessment Fragile 01/31/24 1315   Odor None 01/28/24 0300       Negative Pressure Wound Therapy Abdomen Lower;Medial;Right (Active)   Wound Type Surgical 01/31/24 1315   Dressing Type Black Foam 01/31/24 1315   Cycle Continuous 01/31/24 1315   Target Pressure (mmHg) 125 01/31/24 1315   Canister changed? No 01/31/24 1315   Dressing Status Clean, dry & intact 01/31/24 1315   Dressing Changed Dressing reinforced 01/31/24 1315   Drainage Amount None 01/31/24 1315   Drainage Description Sanguinous 01/31/24 1315   Dressing Change Due 01/31/24 01/31/24 1315   Output (ml) 75 ml 01/31/24 0400   Yokasta-wound Assessment Other (Comment) 01/30/24 1355       Gastrostomy/Enterostomy/Jejunostomy Tube  Gastrostomy RUQ 1 20 fr (Active)   Drainage Appearance Yellow 01/28/24 1600   Site Description Scabbed 01/31/24 1315   J Port Status Clamped 01/31/24 1315   G Port Status Clamped 01/31/24 1315   Surrounding Skin Dry;Reddened 01/31/24 1315   Dressing Status Other (Comment) 01/28/24 0300   Dressing Type Open to air 01/31/24 1315   G-Tube Care Completed Yes 01/28/24 1600   Tube Feeding Immune Enhancing 01/30/24 0437   Tube feeding/verify rate (mL/hr) 60 mL/hr 01/30/24 0437   Tube Feeding Intake (mL) 455 ml 01/30/24 0437   Free Water/Flush (mL) 0 mL 01/31/24 0000   Output (mL) 90 ml 01/16/24 0600   Action Taken Other (comment) 01/21/24 0400   Residual Volume (ml) 0 ml 01/15/24 1600       [REMOVED] Closed/Suction Drain Lateral;Superior RUQ Bulb (Removed)   Site Description Reddened;Leaking at site 01/19/24 2000   Dressing Status Clean, dry & intact 01/19/24 2000   Drainage Appearance Serous 01/19/24 2000   Drain Status Compressed;To bulb suction 01/19/24 2000   Output (ml) 120 ml 01/19/24 1400       [REMOVED] Closed/Suction Drain Lateral;Inferior RUQ Bulb (Removed)   Site Description Clean, dry & intact 01/09/24 0800   Dressing Status Clean, dry & intact 01/09/24 0800   Drainage Appearance Serous 01/09/24 0800   Drain Status Compressed 01/09/24 0900   Output (ml) 0 ml 01/09/24 1109       [REMOVED] NG/OG/NJ/NE Tube Nasogastric Right nostril (Removed)   Surrounding Skin Clean, dry & intact 01/08/24 1735   Securement device Bridle 01/08/24 1735   Status Clamped 01/08/24 1735   Placement Verified External Catheter Length 01/08/24 1735   NG/OG/NJ/NE External Measurement (cm) 60 cm 01/08/24 1735   Drainage Appearance Bloody;Dark Red 01/08/24 1735   Free Water/Flush (mL) 80 mL 01/08/24 0400   Output (mL) 100 ml 01/08/24 0400   Action Taken Placement verified (comment) 01/08/24 1735       [REMOVED] Gastrostomy/Enterostomy/Jejunostomy Tube Percutaneous Endoscopic Gastrostomy (PEG) 20 fr (Removed)       [REMOVED] Colostomy RLQ

## 2024-02-09 NOTE — PROGRESS NOTES
[facility]/ [facility] nurse given pre-op instructions for OR 2-12-24. Nurse instructed to hold ASA today per orders of Marielena/ Dr. Ulloa.

## 2024-02-09 NOTE — TELEPHONE ENCOUNTER
Contacted Hoa Lance at [facility], she states that patient is set up for transport to [facility] on Monday 2/12 at 7 AM for OR at 9 AM with Dr. Ulloa.

## 2024-02-12 ENCOUNTER — ANESTHESIA EVENT (OUTPATIENT)
Dept: OPERATING ROOM | Age: 59
End: 2024-02-12
Payer: MEDICARE

## 2024-02-12 ENCOUNTER — TELEPHONE (OUTPATIENT)
Dept: SURGERY | Age: 59
End: 2024-02-12

## 2024-02-12 ENCOUNTER — HOSPITAL ENCOUNTER (OUTPATIENT)
Age: 59
Setting detail: OUTPATIENT SURGERY
Discharge: ANOTHER HOSPITAL | End: 2024-02-12
Attending: SURGERY | Admitting: SURGERY
Payer: MEDICARE

## 2024-02-12 ENCOUNTER — ANESTHESIA (OUTPATIENT)
Dept: OPERATING ROOM | Age: 59
End: 2024-02-12
Payer: MEDICARE

## 2024-02-12 VITALS
HEART RATE: 79 BPM | BODY MASS INDEX: 45.1 KG/M2 | WEIGHT: 315 LBS | TEMPERATURE: 97.2 F | RESPIRATION RATE: 17 BRPM | DIASTOLIC BLOOD PRESSURE: 56 MMHG | SYSTOLIC BLOOD PRESSURE: 97 MMHG | HEIGHT: 70 IN | OXYGEN SATURATION: 100 %

## 2024-02-12 DIAGNOSIS — Z46.89 ENCOUNTER FOR MANAGEMENT OF WOUND VAC: Primary | ICD-10-CM

## 2024-02-12 LAB
GLUCOSE BLD-MCNC: 80 MG/DL (ref 75–110)
GLUCOSE BLD-MCNC: 93 MG/DL (ref 75–110)
PARTIAL THROMBOPLASTIN TIME: 29.2 SEC (ref 23–36.5)

## 2024-02-12 PROCEDURE — 7100000000 HC PACU RECOVERY - FIRST 15 MIN: Performed by: SURGERY

## 2024-02-12 PROCEDURE — 85730 THROMBOPLASTIN TIME PARTIAL: CPT

## 2024-02-12 PROCEDURE — 15274 SKN SUB GRFT T/A/L CHILD ADD: CPT | Performed by: SURGERY

## 2024-02-12 PROCEDURE — 15002 WOUND PREP TRK/ARM/LEG: CPT | Performed by: SURGERY

## 2024-02-12 PROCEDURE — 2580000003 HC RX 258: Performed by: SURGERY

## 2024-02-12 PROCEDURE — 82947 ASSAY GLUCOSE BLOOD QUANT: CPT

## 2024-02-12 PROCEDURE — 2500000003 HC RX 250 WO HCPCS: Performed by: NURSE ANESTHETIST, CERTIFIED REGISTERED

## 2024-02-12 PROCEDURE — 97606 NEG PRS WND THER DME>50 SQCM: CPT | Performed by: SURGERY

## 2024-02-12 PROCEDURE — 15003 WOUND PREP ADDL 100 CM: CPT | Performed by: SURGERY

## 2024-02-12 PROCEDURE — 3700000000 HC ANESTHESIA ATTENDED CARE: Performed by: SURGERY

## 2024-02-12 PROCEDURE — 3600000004 HC SURGERY LEVEL 4 BASE: Performed by: SURGERY

## 2024-02-12 PROCEDURE — 6360000002 HC RX W HCPCS: Performed by: NURSE ANESTHETIST, CERTIFIED REGISTERED

## 2024-02-12 PROCEDURE — 3700000001 HC ADD 15 MINUTES (ANESTHESIA): Performed by: SURGERY

## 2024-02-12 PROCEDURE — 6370000000 HC RX 637 (ALT 250 FOR IP): Performed by: SURGERY

## 2024-02-12 PROCEDURE — 2709999900 HC NON-CHARGEABLE SUPPLY: Performed by: SURGERY

## 2024-02-12 PROCEDURE — 7100000001 HC PACU RECOVERY - ADDTL 15 MIN: Performed by: SURGERY

## 2024-02-12 PROCEDURE — 15273 SKIN SUB GRFT T/ARM/LG CHILD: CPT | Performed by: SURGERY

## 2024-02-12 PROCEDURE — 3600000014 HC SURGERY LEVEL 4 ADDTL 15MIN: Performed by: SURGERY

## 2024-02-12 PROCEDURE — 2720000010 HC SURG SUPPLY STERILE: Performed by: SURGERY

## 2024-02-12 PROCEDURE — 2580000003 HC RX 258: Performed by: NURSE ANESTHETIST, CERTIFIED REGISTERED

## 2024-02-12 DEVICE — CYTAL® WOUND MATRIX 3-LAYER, 10CM X 15CM
Type: IMPLANTABLE DEVICE | Site: ABDOMEN | Status: FUNCTIONAL
Brand: CYTAL®

## 2024-02-12 DEVICE — MICROMATRIX® UBM STANDARD PARTICULATE, 1000MG
Type: IMPLANTABLE DEVICE | Site: ABDOMEN | Status: FUNCTIONAL
Brand: MICROMATRIX®

## 2024-02-12 RX ORDER — MAGNESIUM HYDROXIDE 1200 MG/15ML
LIQUID ORAL CONTINUOUS PRN
Status: DISCONTINUED | OUTPATIENT
Start: 2024-02-12 | End: 2024-02-12 | Stop reason: HOSPADM

## 2024-02-12 RX ORDER — HYDRALAZINE HYDROCHLORIDE 20 MG/ML
10 INJECTION INTRAMUSCULAR; INTRAVENOUS
Status: DISCONTINUED | OUTPATIENT
Start: 2024-02-12 | End: 2024-02-12 | Stop reason: HOSPADM

## 2024-02-12 RX ORDER — CALCIUM CHLORIDE 100 MG/ML
INJECTION INTRAVENOUS; INTRAVENTRICULAR PRN
Status: DISCONTINUED | OUTPATIENT
Start: 2024-02-12 | End: 2024-02-12 | Stop reason: SDUPTHER

## 2024-02-12 RX ORDER — SODIUM CHLORIDE 0.9 % (FLUSH) 0.9 %
5-40 SYRINGE (ML) INJECTION PRN
Status: DISCONTINUED | OUTPATIENT
Start: 2024-02-12 | End: 2024-02-12 | Stop reason: HOSPADM

## 2024-02-12 RX ORDER — DEXAMETHASONE SODIUM PHOSPHATE 10 MG/ML
INJECTION INTRAMUSCULAR; INTRAVENOUS PRN
Status: DISCONTINUED | OUTPATIENT
Start: 2024-02-12 | End: 2024-02-12 | Stop reason: SDUPTHER

## 2024-02-12 RX ORDER — SODIUM CHLORIDE 0.9 % (FLUSH) 0.9 %
5-40 SYRINGE (ML) INJECTION EVERY 12 HOURS SCHEDULED
Status: DISCONTINUED | OUTPATIENT
Start: 2024-02-12 | End: 2024-02-12 | Stop reason: HOSPADM

## 2024-02-12 RX ORDER — ULTRASOUND COUPLING MEDIUM
GEL (GRAM) TOPICAL PRN
Status: DISCONTINUED | OUTPATIENT
Start: 2024-02-12 | End: 2024-02-12 | Stop reason: HOSPADM

## 2024-02-12 RX ORDER — MIDAZOLAM HYDROCHLORIDE 1 MG/ML
INJECTION INTRAMUSCULAR; INTRAVENOUS PRN
Status: DISCONTINUED | OUTPATIENT
Start: 2024-02-12 | End: 2024-02-12 | Stop reason: SDUPTHER

## 2024-02-12 RX ORDER — OXYCODONE HYDROCHLORIDE 5 MG/1
5 TABLET ORAL EVERY 6 HOURS PRN
Qty: 10 TABLET | Refills: 0 | Status: SHIPPED | OUTPATIENT
Start: 2024-02-12 | End: 2024-02-15

## 2024-02-12 RX ORDER — ONDANSETRON 2 MG/ML
4 INJECTION INTRAMUSCULAR; INTRAVENOUS
Status: DISCONTINUED | OUTPATIENT
Start: 2024-02-12 | End: 2024-02-12 | Stop reason: HOSPADM

## 2024-02-12 RX ORDER — PROPOFOL 10 MG/ML
INJECTION, EMULSION INTRAVENOUS PRN
Status: DISCONTINUED | OUTPATIENT
Start: 2024-02-12 | End: 2024-02-12 | Stop reason: SDUPTHER

## 2024-02-12 RX ORDER — SODIUM CHLORIDE, SODIUM LACTATE, POTASSIUM CHLORIDE, CALCIUM CHLORIDE 600; 310; 30; 20 MG/100ML; MG/100ML; MG/100ML; MG/100ML
INJECTION, SOLUTION INTRAVENOUS CONTINUOUS PRN
Status: DISCONTINUED | OUTPATIENT
Start: 2024-02-12 | End: 2024-02-12 | Stop reason: SDUPTHER

## 2024-02-12 RX ORDER — ROCURONIUM BROMIDE 10 MG/ML
INJECTION, SOLUTION INTRAVENOUS PRN
Status: DISCONTINUED | OUTPATIENT
Start: 2024-02-12 | End: 2024-02-12 | Stop reason: SDUPTHER

## 2024-02-12 RX ORDER — SODIUM CHLORIDE 9 MG/ML
INJECTION, SOLUTION INTRAVENOUS PRN
Status: DISCONTINUED | OUTPATIENT
Start: 2024-02-12 | End: 2024-02-12 | Stop reason: HOSPADM

## 2024-02-12 RX ADMIN — CALCIUM CHLORIDE 0.2 G: 100 INJECTION INTRAVENOUS; INTRAVENTRICULAR at 13:10

## 2024-02-12 RX ADMIN — PROPOFOL 30 MG: 10 INJECTION, EMULSION INTRAVENOUS at 12:36

## 2024-02-12 RX ADMIN — MIDAZOLAM 1 MG: 1 INJECTION INTRAMUSCULAR; INTRAVENOUS at 12:40

## 2024-02-12 RX ADMIN — PROPOFOL 50 MG: 10 INJECTION, EMULSION INTRAVENOUS at 12:24

## 2024-02-12 RX ADMIN — PHENYLEPHRINE HYDROCHLORIDE 200 MCG: 10 INJECTION INTRAVENOUS at 12:43

## 2024-02-12 RX ADMIN — DEXAMETHASONE SODIUM PHOSPHATE 4 MG: 10 INJECTION INTRAMUSCULAR; INTRAVENOUS at 12:40

## 2024-02-12 RX ADMIN — PHENYLEPHRINE HYDROCHLORIDE 100 MCG: 10 INJECTION INTRAVENOUS at 12:29

## 2024-02-12 RX ADMIN — HYDROMORPHONE HYDROCHLORIDE 0.2 MG: 1 INJECTION, SOLUTION INTRAMUSCULAR; INTRAVENOUS; SUBCUTANEOUS at 13:01

## 2024-02-12 RX ADMIN — ROCURONIUM BROMIDE 30 MG: 10 INJECTION, SOLUTION INTRAVENOUS at 12:40

## 2024-02-12 RX ADMIN — PHENYLEPHRINE HYDROCHLORIDE 200 MCG: 10 INJECTION INTRAVENOUS at 12:24

## 2024-02-12 RX ADMIN — SUGAMMADEX 350 MG: 100 INJECTION, SOLUTION INTRAVENOUS at 14:00

## 2024-02-12 RX ADMIN — SODIUM CHLORIDE, POTASSIUM CHLORIDE, SODIUM LACTATE AND CALCIUM CHLORIDE: 600; 310; 30; 20 INJECTION, SOLUTION INTRAVENOUS at 13:51

## 2024-02-12 RX ADMIN — CALCIUM CHLORIDE 0.4 G: 100 INJECTION INTRAVENOUS; INTRAVENTRICULAR at 13:36

## 2024-02-12 RX ADMIN — HYDROMORPHONE HYDROCHLORIDE 0.3 MG: 1 INJECTION, SOLUTION INTRAMUSCULAR; INTRAVENOUS; SUBCUTANEOUS at 14:01

## 2024-02-12 RX ADMIN — MIDAZOLAM 1 MG: 1 INJECTION INTRAMUSCULAR; INTRAVENOUS at 13:50

## 2024-02-12 RX ADMIN — SODIUM CHLORIDE, POTASSIUM CHLORIDE, SODIUM LACTATE AND CALCIUM CHLORIDE: 600; 310; 30; 20 INJECTION, SOLUTION INTRAVENOUS at 12:12

## 2024-02-12 RX ADMIN — ROCURONIUM BROMIDE 50 MG: 10 INJECTION, SOLUTION INTRAVENOUS at 12:24

## 2024-02-12 RX ADMIN — HYDROMORPHONE HYDROCHLORIDE 0.3 MG: 1 INJECTION, SOLUTION INTRAMUSCULAR; INTRAVENOUS; SUBCUTANEOUS at 14:06

## 2024-02-12 RX ADMIN — HYDROMORPHONE HYDROCHLORIDE 0.2 MG: 1 INJECTION, SOLUTION INTRAMUSCULAR; INTRAVENOUS; SUBCUTANEOUS at 13:51

## 2024-02-12 ASSESSMENT — PAIN DESCRIPTION - LOCATION: LOCATION: ABDOMEN

## 2024-02-12 ASSESSMENT — PAIN SCALES - GENERAL: PAINLEVEL_OUTOF10: 4

## 2024-02-12 ASSESSMENT — PAIN DESCRIPTION - ORIENTATION: ORIENTATION: MID

## 2024-02-12 ASSESSMENT — PAIN DESCRIPTION - DESCRIPTORS: DESCRIPTORS: BURNING

## 2024-02-12 ASSESSMENT — PAIN - FUNCTIONAL ASSESSMENT: PAIN_FUNCTIONAL_ASSESSMENT: NONE - DENIES PAIN

## 2024-02-12 NOTE — H&P
History and Physical    PATIENT NAME: Ruben Dimas  AGE: 58 y.o.  MEDICAL RECORD NO. [id number]  DATE: 2/12/2024  SURGEON: Dr. Ulloa  PRIMARY CARE PHYSICIAN: Ramy Lazo MD    Patient information was obtained from patient.  History/Exam limitations: none.  Patient presented to the Emergency Department by ambulance from [facility].    IMPRESSION:     Patient Active Problem List   Diagnosis    Alcoholic cirrhosis of liver (HCC), sober since 2013    Peripheral edema    Bipolar disorder (HCC)    Type 2 diabetes mellitus with diabetic neuropathy, with long-term current use of insulin (HCC)    Essential hypertension, currently hypotensive    Dyslipidemia    Weakness    Acute metabolic encephalopathy    FABI (obstructive sleep apnea)    Primary osteoarthritis of right knee    Type 2 diabetes mellitus with diabetic neuropathy (HCC)    Acquired hypothyroidism    Urine retention    Anemia    Slow transit constipation    Iron deficiency anemia due to chronic blood loss    Gastroesophageal reflux disease without esophagitis    LEONARDA (acute kidney injury) (HCC)    Secondary esophageal varices without bleeding (HCC)    Portal hypertension (HCC)    Obesity, morbid, BMI 50 or higher (HCC)    Venous stasis dermatitis of both lower extremities    Cellulitis of perineum    Chronic indwelling Clemons catheter    Anxiety and depression    Back pain, chronic    BPH (benign prostatic hyperplasia)    Chronic diastolic CHF (congestive heart failure) (HCC)    Cirrhosis (HCC)    Diabetes mellitus (HCC)    GERD (gastroesophageal reflux disease)    Hepatitis    Hiatal hernia    Hypertension, essential    IBS (irritable bowel syndrome)    Morbid obesity with BMI of 45.0-49.9, adult (HCC)    Neuropathy    Chronic respiratory failure with hypoxia, on home oxygen therapy (HCC)    Sleep apnea    Thyroid disease    Urinary bladder neurogenic dysfunction    Acute UTI    Musculoskeletal immobility    Pyocystis    Myoclonic jerking  MD Diane   pantoprazole (PROTONIX) 2 mg/mL SUSP oral suspension Take 20 mLs by mouth daily    Diane Sorensen MD   oxyCODONE (ROXICODONE) 5 MG immediate release tablet Take 1 tablet by mouth every 6 hours as needed for Pain. 5-10 mg    Diane Sorensen MD   INSULIN LISPRO SC Inject into the skin 4 times daily (before meals and nightly) Sliding scale 0-16 units , scale starts at blood sugar greater than 10    Diane Sorensen MD   amiodarone (CORDARONE) 200 MG tablet Take 1 tablet by mouth daily    Diane Sorensen MD   vitamin D3 (CHOLECALCIFEROL) 125 MCG (5000 UT) TABS tablet Take 1 tablet by mouth daily 12/13/23   Diane Sorensen MD   heparin, porcine, 5000 UNIT/ML injection Inject 1.5 mLs into the skin every 8 hours Per [facility] nurse nuñez, Heparin stopped 2-7-24 for OR 2-12-24.    Diane Sorensen MD   senna (SENOKOT) 8.6 MG tablet Take 1 tablet by mouth 2 times daily    Diane Sorensen MD   ALPRAZolam (XANAX) 0.25 MG tablet Take 1 tablet by mouth nightly as needed for Anxiety.    Diane Sorensen MD   glucose (GLUTOSE) 40 % GEL Take 37.5 mLs by mouth daily as needed (For blood sugar <70)    Diane Sorensen MD   HYDROmorphone (DILAUDID) 1 MG/ML injection Infuse 0.5 mLs intravenously every 3 hours as needed for Pain.    Diane Sorensen MD   docusate (COLACE) 50 MG/5ML liquid 10 mLs by Per G Tube route 2 times daily  Patient taking differently: Take 10 mLs by mouth 2 times daily 11/9/23   Zakia Sanchez MD   levothyroxine (SYNTHROID) 200 MCG tablet 1 tablet by Per G Tube route Daily  Patient taking differently: Take 1 tablet by mouth Daily 11/10/23   Zakia Sanchez MD   midodrine (PROAMATINE) 5 MG tablet 3 tablets by Orogastric route every 6 hours  Patient taking differently: Take 1 tablet by mouth 3 times daily 11/9/23   Zakia Sanchez MD   albuterol (PROVENTIL) (2.5 MG/3ML) 0.083% nebulizer solution Take 3 mLs by nebulization every 4 hours as  \"NA\", \"K\", \"CL\", \"CO2\", \"BUN\", \"CREATININE\", \"MG\", \"PHOS\", \"CALCIUM\", \"PTT\", \"INR\", \"AST\", \"ALT\", \"BILITOT\", \"BILIDIR\", \"NITRU\", \"COLORU\", \"BACTERIA\" in the last 72 hours.    Invalid input(s): \"PT\", \"WBCU\", \"RBCU\", \"LEUKOCYTESUA\"  No results for input(s): \"ALKPHOS\", \"ALT\", \"AST\", \"BILITOT\", \"BILIDIR\", \"LABALBU\", \"AMYLASE\", \"LIPASE\" in the last 72 hours.      RADIOLOGY:   No results found.     Thank you for the interesting evaluation. Further recommendations to follow.    Elaina Carvalho MD  2/12/2024, 10:47 AM

## 2024-02-12 NOTE — TELEPHONE ENCOUNTER
Spoke with Hoa at [facility]. Advised that transport will need set up for patient to return to the OR on Friday 2/16. He is scheduled for OR at 10 AM, advised he will need to be at [facility] by 8 AM. She advises this was passed on to transport and confirmed.

## 2024-02-12 NOTE — DISCHARGE INSTR - COC
Continuity of Care Form    Patient Name: Ruben Dimas   :  1965  MRN:  [id number]    Admit date:  2024  Discharge date:  ***    Code Status Order: Prior   Advance Directives:   Advance Care Flowsheet Documentation       Date/Time Healthcare Directive Type of Healthcare Directive Copy in Chart Healthcare Agent Appointed Healthcare Agent's Name Healthcare Agent's Phone Number    [phone] Yes, patient has an advance directive for healthcare treatment Durable power of  for health care Yes, copy in chart Healthcare power of  Richi-child --            Admitting Physician:  Escobar Ulloa MD  PCP: Ramy Lazo MD    Discharging Nurse: ***  Discharging Hospital Unit/Room#: [facility] OR [facility]/NONE  Discharging Unit Phone Number: ***    Emergency Contact:   Extended Emergency Contact Information  Primary Emergency Contact: Richi Dimas   [facility]  Home Phone: [phone]  Work Phone: [phone]  Mobile Phone: [phone]  Relation: Child  Secondary Emergency Contact: Igor Dimas   [facility]  Home Phone: [phone]  Relation: Child    Past Surgical History:  Past Surgical History:   Procedure Laterality Date    ABDOMEN SURGERY      hernia repair x4 (ventral)    ABDOMEN SURGERY N/A 2024    PREPARATION OF WOUND BED, PLACEMENT OF SKIN SUBSTITUTE, WOUND VAC PLACEMENT, IRRIGATION AND DEBRIDEMENT OF RIGHT LOWER ABDOMINAL WOUND performed by Stephanie Joseph MD at [facility] OR    ABDOMEN SURGERY N/A 2024    WOUND VAC EXCHANGE, GRAFT PREPERATION 54X47 AND NWP GREATER THAN 50 SQ CM performed by Escobar Ulloa MD at [facility] OR    ABDOMEN SURGERY N/A 2024    TAKE BACK ABDOMINAL  WOUND DEBRIDEMENT, WOUND BED PREPARATION, WOUND VAC CHANGE  (MISONIX) performed by Mary Carmen Monte MD at [facility] OR    ABDOMEN SURGERY N/A 01/15/2024    TAKE BACK ABDOMINAL  WOUND DEBRIDEMENT,  WOUND VAC CHANGE performed by Fazal Wood MD at [facility] OR    ABDOMEN SURGERY  N/A 01/18/2024    TAKE BACK ABDOMINAL WOUND DEBRIDEMENT, WOUND BED PREPARATION, WOUND VAC PLACEMENT  (MISONIX) performed by Fazal Wood MD at [facility] OR    ABDOMEN SURGERY N/A 01/21/2024    ABDOMEN WOUND RE-EXPLORATION AND DEBRIDEMENT, INTEGRA PLACEMENT performed by Fazal Wood MD at [facility] OR    ABDOMEN SURGERY N/A 01/26/2024    WOUND PREPARATION, APPLICATION OF SKIN SUBSTITUTE AND WOUND VAC APPLICATION X2 performed by Escobar Ulloa MD at [facility] OR    ABDOMEN SURGERY N/A 01/31/2024    RE-EXPLORATION OF ABDOMINAL WOUND, WOUND BED PREPARATION, WOUND VAC  PLACEMENT (MISONIX) performed by Escobar Ulloa MD at [facility] OR    ABDOMEN SURGERY  02/07/2024    ABDOMINAL WOUND PREPARATION, APPLICATION SKIN SUBSITUTE, WOUND VAC PLACEMENT (MISONIX)    ABDOMEN SURGERY N/A 02/07/2024    ABDOMINAL WOUND PREPARATION, APPLICATION  SKIN SUBSITUTE, WOUND VAC PLACEMENT  (MISONIX) performed by Escobar Ulloa MD at [facility] OR    CARDIAC CATHETERIZATION      unknown when or results    COLONOSCOPY  2012    COLONOSCOPY N/A 04/19/2023    COLONOSCOPY DIAGNOSTIC performed by Dorian Rendon MD at [email or web address] OR    COLONOSCOPY N/A 04/20/2023    COLONOSCOPY DIAGNOSTIC performed by Dorian Rendon MD at [facility] [[email or web address] OR    COLONOSCOPY  09/19/2016    [facility]    CYSTOSCOPY N/A 02/20/2019    CYSTOSCOPY DILATION performed by Fazal Guallpa MD at [facility] [[email or web address] OR    CYSTOSCOPY N/A 08/23/2019    CYSTOSCOPY/ DILATION NICOLE CATHETER EXCHANGE performed by Fazal Guallpa MD at [facility] [[email or web address] OR    CYSTOSCOPY N/A 06/08/2022    CYSTOSCOPY, REMOVAL OF SMALL BLADDER STONE AND BLADDER IRRIGATION performed by Fazal Guallpa MD at [facility] [[email or web address] OR    ESOPHAGOGASTRODUODENOSCOPY  07/24/2018    [facility]    ESOPHAGOGASTRODUODENOSCOPY  02/21/2017    [facility]    ESOPHAGOGASTRODUODENOSCOPY  12/20/2016    [facility]    ESOPHAGOGASTRODUODENOSCOPY  09/19/2016    [facility]    ESOPHAGOGASTRODUODENOSCOPY  08/16/2016    [facility]    LAPAROTOMY N/A 01/02/2024    LAPAROTOMY EXPLORATORY, DEBRIDEMENT OF SUBQ  08/22/23 Groin Right;Left Open areas groin folds bilateral (Active)   Number of days: 174       Wound 08/22/23 Knee Left;Posterior open area (Active)   Number of days: 174       Wound 08/22/23 Knee Posterior;Right open area (Active)   Number of days: 174       Wound 11/01/23 Arm Left;Lower;Posterior (Active)   Number of days: 103       Wound 11/01/23 Hand Posterior;Right (Active)   Number of days: 103       Wound 11/07/23 Toe (Comment  which one) Left Hallux; s/p partial nail avulsion per Podiatry 10/20 (Active)   Number of days: 97       Wound 01/04/24 Abdomen Lateral;Lower;Right (Active)   Wound Etiology Other 01/30/24 1355   Dressing Status New dressing applied 01/30/24 1355   Wound Cleansed Irrigated with saline 01/31/24 1315   Dressing/Treatment Moist to dry;ABD 01/31/24 1315   Dressing Change Due 01/31/24 01/31/24 1315   Wound Assessment Other (Comment) 01/31/24 1315   Drainage Amount Moderate (25-50%) 01/31/24 1315   Drainage Description Sanguinous 01/31/24 1315   Odor None 01/31/24 1315   Yokasta-wound Assessment Fragile;Maceration 01/31/24 1315   Margins Other (Comment) 01/31/24 1315   Number of days: 39       Wound 01/21/24 Sacrum (Active)   Wound Image   01/24/24 1510   Wound Etiology Skin Tear 01/31/24 1315   Dressing Status Dry;Intact 01/29/24 2356   Wound Cleansed Not Cleansed 01/29/24 2356   Dressing/Treatment Open to air;Moisture barrier 01/29/24 2356   Wound Length (cm) 3.5 cm 01/24/24 1510   Wound Width (cm) 0.3 cm 01/24/24 1510   Wound Depth (cm) 0.1 cm 01/24/24 1510   Wound Surface Area (cm^2) 1.05 cm^2 01/24/24 1510   Wound Volume (cm^3) 0.105 cm^3 01/24/24 1510   Wound Assessment Pink/red 01/29/24 2356   Drainage Amount None (dry) 01/28/24 0300   Drainage Description Serosanguinous 01/24/24 1510   Odor None 01/24/24 1510   Yokasta-wound Assessment Intact;Fragile 01/29/24 2356   Margins Attached edges 01/29/24 1631   Number of days: 21       Incision 01/02/24 Abdomen Medial;Upper (Active)   Dressing

## 2024-02-12 NOTE — ANESTHESIA PRE PROCEDURE
07/19/23                            Elo Ibrahim  [address] [place] [place] [address]    To Whom It May Concern:    This is to certify Elo Ibrahim is under my care. I reviewed urgent care note from 7/13/23 and documentation. That is for a different issue than her FMLA.                  Clementine Raman MD  [facility] [place]  [address] [address]  Dept Phone: [phone]       Department of Anesthesiology  Preprocedure Note       Name:  Ruben Dimas   Age:  58 y.o.  :  1965                                          MRN:  [id number]         Date:  2024      Surgeon: Surgeon(s):  Escobar Ulloa MD    Procedure: Procedure(s):  ABDOMINAL WOUND PREPARATION, APPLICATION SKIN SUBSTITUTE, WOUND VAC PLACEMENT  (MISONIX)    Medications prior to admission:   Prior to Admission medications    Medication Sig Start Date End Date Taking? Authorizing Provider   ceftolozane-tazobactam (ZERBAXA) 1.5 (1-0.5) g SOLR injection Infuse 1,500 mg intravenously in the morning and 1,500 mg at noon and 1,500 mg in the evening.   Yes Diane Sorensen MD   glucagon 1 MG injection Inject 1 mg into the muscle daily as needed (blood glucose <70)   Yes Diane Sorensen MD   CITALOPRAM HYDROBROMIDE PO Take 30 mg by mouth daily    Diane Sorensen MD   ferrous Sulfate 300 (60 Fe) MG/5ML solution Take 5 mLs by mouth daily    Diane Sorensen MD   folic acid (FOLVITE) 1 MG tablet Take 1 tablet by mouth daily    Diane Sorensen MD   pantoprazole (PROTONIX) 2 mg/mL SUSP oral suspension Take 20 mLs by mouth daily    Diane Sorensen MD   oxyCODONE (ROXICODONE) 5 MG immediate release tablet Take 1 tablet by mouth every 6 hours as needed for Pain. 5-10 mg    Diane Sorensen MD   INSULIN LISPRO SC Inject into the skin 4 times daily (before meals and nightly) Sliding scale 0-16 units , scale starts at blood sugar greater than 10    Diane Sorensen MD   amiodarone (CORDARONE) 200 MG tablet Take 1 tablet by mouth daily    Diane Sorensen MD   vitamin D3 (CHOLECALCIFEROL) 125 MCG (5000 UT) TABS tablet Take 1 tablet by mouth daily 23   Diane Sorensen MD   heparin, porcine, 5000 UNIT/ML injection Inject 1.5 mLs into the skin every 8 hours Per [facility] nurse nuñez, Heparin stopped 24 for OR 2--24.    Diane Sorensen MD   senna (SENOKOT) 8.6 MG tablet Take 1

## 2024-02-12 NOTE — ANESTHESIA POSTPROCEDURE EVALUATION
Department of Anesthesiology  Postprocedure Note    Patient: Ruben Dimas  MRN: [id number]  YOB: 1965  Date of evaluation: 2/12/2024    Procedure Summary     Date: 02/12/24 Room / Location: [address]    Anesthesia Start: 1212 Anesthesia Stop: 1430    Procedure: ABDOMINAL WOUND PREPARATION, APPLICATION SKIN SUBSTITUTE, WOUND VAC PLACEMENT, INTEGRA Diagnosis:       Open wound of abdomen, initial encounter      (Open wound of abdomen, initial encounter [S31.109A])    Surgeons: Escobar Ulloa MD Responsible Provider: Tesfaye Alfaro MD    Anesthesia Type: general ASA Status: 4          Anesthesia Type: No value filed.    Des Phase I:      Des Phase II:      Anesthesia Post Evaluation    Patient location during evaluation: bedside  Patient participation: complete - patient participated  Level of consciousness: awake  Airway patency: patent  Nausea & Vomiting: no nausea and no vomiting  Cardiovascular status: hemodynamically stable  Respiratory status: acceptable  Hydration status: stable  Comments: BP (!) 110/46   Pulse 88   Temp 97 °F (36.1 °C) (Temporal)   Resp 18   Ht 1.778 m (5' 10\")   Wt (!) 172.4 kg (380 lb)   SpO2 96%   BMI 54.52 kg/m²           No notable events documented.   n/a (pt not taking any psychotropic medications and no indication for such at this time)

## 2024-02-12 NOTE — BRIEF OP NOTE
Brief Postoperative Note      Patient: Ruben Dimas  YOB: 1965  MRN: [id number]    Date of Procedure: 2/12/2024    Pre-Op Diagnosis Codes:     * Open wound of abdomen, initial encounter [S31.109A]    Post-Op Diagnosis: Same       Procedure     Wound preparation with misonix [id number]= 1140sq cm  Application of skin substitute 83umh30la= 1140q cm  Negative pressure wound therapy > 50 cm    Surgeon(s):  Escobar Ulloa MD      Assistant:  Elaina Carvalho MD    Anesthesia: General    Estimated Blood Loss (mL): Minimal    Complications: None    Specimens:   * No specimens in log *    Implants:  Implant Name Type Inv. Item Serial No.  Lot No. LRB No. Used Action   DRESSING WND MICRONIZED PARTIC 1000 MG MATRISTEM MICROMATRIX - [id number]  DRESSING WND MICRONIZED PARTIC 1000 MG MATRISTEM MICROMATRIX  [employer] [id number] N/A 1 Implanted   DRESSING WND MICRONIZED PARTIC 1000 MG [employer]RISTEM MICROMATRIX - [id number]  DRESSING WND MICRONIZED PARTIC 1000 MG MATRISTEM MICROMATRIX  [employer] [id number] N/A 1 Implanted   DRESSING WND MICRONIZED PARTIC 1000 MG MATRISTEM MICROMATRIX - [id number]  DRESSING WND MICRONIZED PARTIC 1000 MG MATRISTEM MICROMATRIX  [employer] [id number] N/A 1 Implanted   DRESSING WND 3 LAYR 10X15 CM MTRX CYTAL - [id number]  DRESSING WND 3 LAYR 10X15 CM MTRX CYTAL  [employer] [id number] N/A 1 Implanted   DRESSING WND MICRONIZED PARTIC 1000 MG MATRISTEM MICROMATRIX - [id number]  DRESSING WND MICRONIZED PARTIC 1000 MG MATRISTEM MICROMATRIX  [employer] [id number] N/A 1 Implanted         Drains:   Negative Pressure Wound Therapy Abdomen Medial;Upper (Active)       Negative Pressure Wound Therapy Abdomen Medial;Right;Upper (Active)       Gastrostomy/Enterostomy/Jejunostomy Tube Gastrostomy RUQ 1 20 fr (Active)   Drainage Appearance Yellow 01/28/24 1600   Site Description Scabbed 01/31/24 1315   J Port Status Clamped 01/31/24 1315   G Port Status

## 2024-02-12 NOTE — DISCHARGE INSTRUCTIONS
Patient Discharge Instructions  Discharge Date:  2/12/2024    HYGEINE: Ok to shower, no soaking in a tub/pool until seen at your follow up appointment. Clean incision daily with gentle soap and water, do not use alcohol/peroxide or any harsh cleansers.    WOUND VAC: Keep suction to 125 mmHg. If it loses suction, try trouble shooting and apply additional transparent dressing. If unable to fix, call the clinic to discuss    DRIVING: No driving while taking narcotic medications or while in pain    ACTIVITY: No lifting greater than 15lbs for 6 weeks or any strenuous activity.     DIET: Resume your normal diet as advised by your PCP.    MEDICATIONS: Take all medications as prescribed.     SPECIAL INSTRUCTIONS:     Follow up with Dr. Ulloa at your scheduled wound vac exchange/operative vac exchange. Call the clinic for appointment. Call sooner if fever above 100.4 degrees Farenheit, increase in swelling or redness, thick purulent discharge or pain not controlled with medications.

## 2024-02-13 NOTE — OP NOTE
Operative Note      Patient: Ruben Dimas  YOB: 1965  MRN: [id number]    Date of Procedure: 2/12/2024    Pre-Op Diagnosis Codes:     * Open wound of abdomen, initial encounter [S31.109A]    Post-Op Diagnosis: Same    Procedure     Wound preparation with misonix 63zuy08ph= 1140sq cm  Application of skin substitute 34lkh27xd= 1140q cm  Negative pressure wound therapy > 50 cm    Surgeon(s):  Escobar Ulloa MD      Assistant:   Elaina Carvalho MD    Anesthesia: General    Estimated Blood Loss (mL): Minimal    Complications: None    Specimens:   * No specimens in log *    Implants:  Implant Name Type Inv. Item Serial No.  Lot No. LRB No. Used Action   DRESSING WND MICRONIZED PARTIC 1000 MG MATRISTEM MICROMATRIX - [id number]  DRESSING WND MICRONIZED PARTIC 1000 MG MATRISTEM MICROMATRIX  [employer] [id number] N/A 1 Implanted   DRESSING WND MICRONIZED PARTIC 1000 MG MATRISTEM MICROMATRIX - [id number]  DRESSING WND MICRONIZED PARTIC 1000 MG MATRISTEM MICROMATRIX  [employer] [id number] N/A 1 Implanted   DRESSING WND MICRONIZED PARTIC 1000 MG MATRISTEM MICROMATRIX - [id number]  DRESSING WND MICRONIZED PARTIC 1000 MG MATRISTEM MICROMATRIX  [employer] [id number] N/A 1 Implanted   DRESSING WND 3 LAYR 10X15 CM MTRX CYTAL - [id number]  DRESSING WND 3 LAYR 10X15 CM MTRX CYTAL  [employer] [id number] N/A 1 Implanted   DRESSING WND MICRONIZED PARTIC 1000 MG MATRISTEM MICROMATRIX - [id number]  DRESSING WND MICRONIZED PARTIC 1000 MG MATRISTEM MICROMATRIX  [employer] [id number] N/A 1 Implanted         Drains:   Negative Pressure Wound Therapy Abdomen Medial;Upper (Active)   Dressing Type Black Foam 02/12/24 1449   Target Pressure (mmHg) 125 02/12/24 1449   Dressing Status Clean, dry & intact 02/12/24 1449   Drainage Amount Moderate 02/12/24 1449   Drainage Description Serosanguinous 02/12/24 1449       Negative Pressure Wound Therapy Abdomen Medial;Right;Upper (Active)

## 2024-02-15 LAB
ALBUMIN SERPL-MCNC: 1.9 G/DL (ref 3.5–5.2)
ALP SERPL-CCNC: 1429 U/L (ref 40–129)
ALT SERPL-CCNC: 64 U/L (ref 5–41)
ANION GAP SERPL CALCULATED.3IONS-SCNC: 12 MMOL/L (ref 9–17)
AST SERPL-CCNC: 125 U/L
BILIRUB SERPL-MCNC: 1.5 MG/DL (ref 0.3–1.2)
BUN SERPL-MCNC: 24 MG/DL (ref 6–20)
BUN/CREAT SERPL: 9 (ref 9–20)
CALCIUM SERPL-MCNC: 9.4 MG/DL (ref 8.6–10.4)
CHLORIDE SERPL-SCNC: 102 MMOL/L (ref 98–107)
CO2 SERPL-SCNC: 17 MMOL/L (ref 20–31)
CREAT SERPL-MCNC: 2.8 MG/DL (ref 0.7–1.2)
ERYTHROCYTE [DISTWIDTH] IN BLOOD BY AUTOMATED COUNT: 20.1 % (ref 11.8–14.4)
GFR SERPL CREATININE-BSD FRML MDRD: 25 ML/MIN/1.73M2
GLUCOSE SERPL-MCNC: 88 MG/DL (ref 70–99)
HCT VFR BLD AUTO: 23.2 % (ref 40.7–50.3)
HGB BLD-MCNC: 7.4 G/DL (ref 13–17)
MAGNESIUM SERPL-MCNC: 1.7 MG/DL (ref 1.6–2.6)
MCH RBC QN AUTO: 29.5 PG (ref 25.2–33.5)
MCHC RBC AUTO-ENTMCNC: 31.9 G/DL (ref 28.4–34.8)
MCV RBC AUTO: 92.4 FL (ref 82.6–102.9)
NRBC BLD-RTO: 0 PER 100 WBC
PHOSPHATE SERPL-MCNC: 2.9 MG/DL (ref 2.5–4.5)
PLATELET # BLD AUTO: 137 K/UL (ref 138–453)
PMV BLD AUTO: 10.4 FL (ref 8.1–13.5)
POTASSIUM SERPL-SCNC: 4.7 MMOL/L (ref 3.7–5.3)
PROT SERPL-MCNC: 5 G/DL (ref 6.4–8.3)
RBC # BLD AUTO: 2.51 M/UL (ref 4.21–5.77)
SODIUM SERPL-SCNC: 131 MMOL/L (ref 135–144)
WBC OTHER # BLD: 15.3 K/UL (ref 3.5–11.3)

## 2024-02-16 ENCOUNTER — ANESTHESIA EVENT (OUTPATIENT)
Dept: OPERATING ROOM | Age: 59
End: 2024-02-16
Payer: MEDICARE

## 2024-02-16 ENCOUNTER — APPOINTMENT (OUTPATIENT)
Dept: ULTRASOUND IMAGING | Age: 59
DRG: 853 | End: 2024-02-16
Attending: SURGERY
Payer: MEDICARE

## 2024-02-16 ENCOUNTER — ANESTHESIA (OUTPATIENT)
Dept: OPERATING ROOM | Age: 59
End: 2024-02-16
Payer: MEDICARE

## 2024-02-16 ENCOUNTER — APPOINTMENT (OUTPATIENT)
Dept: GENERAL RADIOLOGY | Age: 59
DRG: 853 | End: 2024-02-16
Attending: SURGERY
Payer: MEDICARE

## 2024-02-16 ENCOUNTER — HOSPITAL ENCOUNTER (INPATIENT)
Age: 59
LOS: 5 days | DRG: 853 | End: 2024-02-21
Attending: SURGERY | Admitting: SURGERY
Payer: MEDICARE

## 2024-02-16 DIAGNOSIS — I95.9 HYPOTENSION, UNSPECIFIED HYPOTENSION TYPE: Primary | ICD-10-CM

## 2024-02-16 PROBLEM — R10.9 ABDOMINAL PAIN: Status: ACTIVE | Noted: 2024-02-16

## 2024-02-16 PROBLEM — S31.109D OPEN ABDOMINAL WALL WOUND, SUBSEQUENT ENCOUNTER: Status: ACTIVE | Noted: 2023-07-13

## 2024-02-16 LAB
ANION GAP SERPL CALCULATED.3IONS-SCNC: 10 MMOL/L (ref 9–17)
BACTERIA URNS QL MICRO: ABNORMAL
BASOPHILS # BLD: 0 K/UL (ref 0–0.2)
BASOPHILS NFR BLD: 0 % (ref 0–2)
BILIRUB UR QL STRIP: ABNORMAL
BNP SERPL-MCNC: ABNORMAL PG/ML
BUN SERPL-MCNC: 25 MG/DL (ref 6–20)
C3 SERPL-MCNC: 51 MG/DL (ref 90–180)
C4 SERPL-MCNC: 15 MG/DL (ref 10–40)
CALCIUM SERPL-MCNC: 8.9 MG/DL (ref 8.6–10.4)
CASTS #/AREA URNS LPF: ABNORMAL /LPF (ref 0–8)
CHLORIDE SERPL-SCNC: 104 MMOL/L (ref 98–107)
CLARITY UR: ABNORMAL
CO2 SERPL-SCNC: 15 MMOL/L (ref 20–31)
COLOR UR: ABNORMAL
CREAT SERPL-MCNC: 3 MG/DL (ref 0.7–1.2)
CREAT UR-MCNC: 139.2 MG/DL (ref 39–259)
EOSINOPHIL # BLD: 0 K/UL (ref 0–0.44)
EOSINOPHILS RELATIVE PERCENT: 0 % (ref 1–4)
EPI CELLS #/AREA URNS HPF: ABNORMAL /HPF (ref 0–5)
ERYTHROCYTE [DISTWIDTH] IN BLOOD BY AUTOMATED COUNT: 20.3 % (ref 11.8–14.4)
GFR SERPL CREATININE-BSD FRML MDRD: 23 ML/MIN/1.73M2
GLUCOSE BLD-MCNC: 115 MG/DL (ref 75–110)
GLUCOSE BLD-MCNC: 125 MG/DL (ref 75–110)
GLUCOSE BLD-MCNC: 97 MG/DL (ref 75–110)
GLUCOSE SERPL-MCNC: 99 MG/DL (ref 70–99)
GLUCOSE UR STRIP-MCNC: NEGATIVE MG/DL
HCT VFR BLD AUTO: 18.4 % (ref 40.7–50.3)
HCT VFR BLD AUTO: 25.1 % (ref 40.7–50.3)
HGB BLD-MCNC: 5.8 G/DL (ref 13–17)
HGB BLD-MCNC: 8.1 G/DL (ref 13–17)
HGB UR QL STRIP.AUTO: ABNORMAL
IMM GRANULOCYTES # BLD AUTO: 0.13 K/UL (ref 0–0.3)
IMM GRANULOCYTES NFR BLD: 1 %
KETONES UR STRIP-MCNC: ABNORMAL MG/DL
LACTIC ACID, WHOLE BLOOD: 1.6 MMOL/L (ref 0.7–2.1)
LEUKOCYTE ESTERASE UR QL STRIP: ABNORMAL
LYMPHOCYTES NFR BLD: 0.75 K/UL (ref 1.1–3.7)
LYMPHOCYTES RELATIVE PERCENT: 6 % (ref 24–43)
MAGNESIUM SERPL-MCNC: 1.8 MG/DL (ref 1.6–2.6)
MCH RBC QN AUTO: 29.4 PG (ref 25.2–33.5)
MCHC RBC AUTO-ENTMCNC: 31.5 G/DL (ref 28.4–34.8)
MCV RBC AUTO: 93.4 FL (ref 82.6–102.9)
MONOCYTES NFR BLD: 0.38 K/UL (ref 0.1–1.2)
MONOCYTES NFR BLD: 3 % (ref 3–12)
MORPHOLOGY: ABNORMAL
MORPHOLOGY: ABNORMAL
NEUTROPHILS NFR BLD: 90 % (ref 36–65)
NEUTS SEG NFR BLD: 11.24 K/UL (ref 1.5–8.1)
NITRITE UR QL STRIP: NEGATIVE
NRBC BLD-RTO: 0 PER 100 WBC
PH UR STRIP: 5 [PH] (ref 5–8)
PHOSPHATE SERPL-MCNC: 2.7 MG/DL (ref 2.5–4.5)
PLATELET # BLD AUTO: 90 K/UL (ref 138–453)
PMV BLD AUTO: 10 FL (ref 8.1–13.5)
POTASSIUM SERPL-SCNC: 4.3 MMOL/L (ref 3.7–5.3)
PROT UR STRIP-MCNC: ABNORMAL MG/DL
RBC # BLD AUTO: 1.97 M/UL (ref 4.21–5.77)
RBC #/AREA URNS HPF: ABNORMAL /HPF (ref 0–4)
SODIUM SERPL-SCNC: 129 MMOL/L (ref 135–144)
SODIUM UR-SCNC: <20 MMOL/L
SP GR UR STRIP: 1.02 (ref 1–1.03)
TOTAL PROTEIN, URINE: 89 MG/DL
TROPONIN I SERPL HS-MCNC: 78 NG/L (ref 0–22)
UROBILINOGEN UR STRIP-ACNC: NORMAL EU/DL (ref 0–1)
WBC #/AREA URNS HPF: ABNORMAL /HPF (ref 0–5)
WBC OTHER # BLD: 12.5 K/UL (ref 3.5–11.3)

## 2024-02-16 PROCEDURE — 85014 HEMATOCRIT: CPT

## 2024-02-16 PROCEDURE — 84100 ASSAY OF PHOSPHORUS: CPT

## 2024-02-16 PROCEDURE — 81001 URINALYSIS AUTO W/SCOPE: CPT

## 2024-02-16 PROCEDURE — 82947 ASSAY GLUCOSE BLOOD QUANT: CPT

## 2024-02-16 PROCEDURE — 84484 ASSAY OF TROPONIN QUANT: CPT

## 2024-02-16 PROCEDURE — 3600000013 HC SURGERY LEVEL 3 ADDTL 15MIN: Performed by: SURGERY

## 2024-02-16 PROCEDURE — 6370000000 HC RX 637 (ALT 250 FOR IP): Performed by: STUDENT IN AN ORGANIZED HEALTH CARE EDUCATION/TRAINING PROGRAM

## 2024-02-16 PROCEDURE — 36415 COLL VENOUS BLD VENIPUNCTURE: CPT

## 2024-02-16 PROCEDURE — 2580000003 HC RX 258: Performed by: ANESTHESIOLOGY

## 2024-02-16 PROCEDURE — 36620 INSERTION CATHETER ARTERY: CPT | Performed by: SURGERY

## 2024-02-16 PROCEDURE — 2580000003 HC RX 258: Performed by: STUDENT IN AN ORGANIZED HEALTH CARE EDUCATION/TRAINING PROGRAM

## 2024-02-16 PROCEDURE — 87077 CULTURE AEROBIC IDENTIFY: CPT

## 2024-02-16 PROCEDURE — 97606 NEG PRS WND THER DME>50 SQCM: CPT | Performed by: SURGERY

## 2024-02-16 PROCEDURE — P9041 ALBUMIN (HUMAN),5%, 50ML: HCPCS | Performed by: ANESTHESIOLOGY

## 2024-02-16 PROCEDURE — 2500000003 HC RX 250 WO HCPCS: Performed by: STUDENT IN AN ORGANIZED HEALTH CARE EDUCATION/TRAINING PROGRAM

## 2024-02-16 PROCEDURE — P9041 ALBUMIN (HUMAN),5%, 50ML: HCPCS

## 2024-02-16 PROCEDURE — 7100000001 HC PACU RECOVERY - ADDTL 15 MIN: Performed by: SURGERY

## 2024-02-16 PROCEDURE — 06HY33Z INSERTION OF INFUSION DEVICE INTO LOWER VEIN, PERCUTANEOUS APPROACH: ICD-10-PCS | Performed by: SURGERY

## 2024-02-16 PROCEDURE — 83735 ASSAY OF MAGNESIUM: CPT

## 2024-02-16 PROCEDURE — 6360000002 HC RX W HCPCS

## 2024-02-16 PROCEDURE — 02HV33Z INSERTION OF INFUSION DEVICE INTO SUPERIOR VENA CAVA, PERCUTANEOUS APPROACH: ICD-10-PCS | Performed by: SURGERY

## 2024-02-16 PROCEDURE — 84156 ASSAY OF PROTEIN URINE: CPT

## 2024-02-16 PROCEDURE — 2580000003 HC RX 258

## 2024-02-16 PROCEDURE — 86901 BLOOD TYPING SEROLOGIC RH(D): CPT

## 2024-02-16 PROCEDURE — 85018 HEMOGLOBIN: CPT

## 2024-02-16 PROCEDURE — 3700000001 HC ADD 15 MINUTES (ANESTHESIA): Performed by: SURGERY

## 2024-02-16 PROCEDURE — 2580000003 HC RX 258: Performed by: SURGERY

## 2024-02-16 PROCEDURE — 84300 ASSAY OF URINE SODIUM: CPT

## 2024-02-16 PROCEDURE — P9016 RBC LEUKOCYTES REDUCED: HCPCS

## 2024-02-16 PROCEDURE — 2500000003 HC RX 250 WO HCPCS

## 2024-02-16 PROCEDURE — 2000000000 HC ICU R&B

## 2024-02-16 PROCEDURE — 87040 BLOOD CULTURE FOR BACTERIA: CPT

## 2024-02-16 PROCEDURE — 86900 BLOOD TYPING SEROLOGIC ABO: CPT

## 2024-02-16 PROCEDURE — 3700000000 HC ANESTHESIA ATTENDED CARE: Performed by: SURGERY

## 2024-02-16 PROCEDURE — 87086 URINE CULTURE/COLONY COUNT: CPT

## 2024-02-16 PROCEDURE — 71045 X-RAY EXAM CHEST 1 VIEW: CPT

## 2024-02-16 PROCEDURE — 93005 ELECTROCARDIOGRAM TRACING: CPT | Performed by: SURGERY

## 2024-02-16 PROCEDURE — 99223 1ST HOSP IP/OBS HIGH 75: CPT | Performed by: SURGERY

## 2024-02-16 PROCEDURE — 11042 DBRDMT SUBQ TIS 1ST 20SQCM/<: CPT | Performed by: SURGERY

## 2024-02-16 PROCEDURE — 86160 COMPLEMENT ANTIGEN: CPT

## 2024-02-16 PROCEDURE — 83605 ASSAY OF LACTIC ACID: CPT

## 2024-02-16 PROCEDURE — 2500000003 HC RX 250 WO HCPCS: Performed by: SURGERY

## 2024-02-16 PROCEDURE — 36430 TRANSFUSION BLD/BLD COMPNT: CPT

## 2024-02-16 PROCEDURE — 7100000000 HC PACU RECOVERY - FIRST 15 MIN: Performed by: SURGERY

## 2024-02-16 PROCEDURE — 87186 SC STD MICRODIL/AGAR DIL: CPT

## 2024-02-16 PROCEDURE — 3600000003 HC SURGERY LEVEL 3 BASE: Performed by: SURGERY

## 2024-02-16 PROCEDURE — 6360000002 HC RX W HCPCS: Performed by: STUDENT IN AN ORGANIZED HEALTH CARE EDUCATION/TRAINING PROGRAM

## 2024-02-16 PROCEDURE — 6370000000 HC RX 637 (ALT 250 FOR IP): Performed by: SURGERY

## 2024-02-16 PROCEDURE — 83880 ASSAY OF NATRIURETIC PEPTIDE: CPT

## 2024-02-16 PROCEDURE — 0JB80ZZ EXCISION OF ABDOMEN SUBCUTANEOUS TISSUE AND FASCIA, OPEN APPROACH: ICD-10-PCS | Performed by: SURGERY

## 2024-02-16 PROCEDURE — 82570 ASSAY OF URINE CREATININE: CPT

## 2024-02-16 PROCEDURE — 11045 DBRDMT SUBQ TISS EACH ADDL: CPT | Performed by: SURGERY

## 2024-02-16 PROCEDURE — 36556 INSERT NON-TUNNEL CV CATH: CPT | Performed by: SURGERY

## 2024-02-16 PROCEDURE — 86850 RBC ANTIBODY SCREEN: CPT

## 2024-02-16 PROCEDURE — 93005 ELECTROCARDIOGRAM TRACING: CPT | Performed by: STUDENT IN AN ORGANIZED HEALTH CARE EDUCATION/TRAINING PROGRAM

## 2024-02-16 PROCEDURE — 6360000002 HC RX W HCPCS: Performed by: ANESTHESIOLOGY

## 2024-02-16 PROCEDURE — 86920 COMPATIBILITY TEST SPIN: CPT

## 2024-02-16 PROCEDURE — 80048 BASIC METABOLIC PNL TOTAL CA: CPT

## 2024-02-16 PROCEDURE — 2709999900 HC NON-CHARGEABLE SUPPLY: Performed by: SURGERY

## 2024-02-16 PROCEDURE — 85025 COMPLETE CBC W/AUTO DIFF WBC: CPT

## 2024-02-16 PROCEDURE — 76770 US EXAM ABDO BACK WALL COMP: CPT

## 2024-02-16 RX ORDER — HEPARIN SODIUM 5000 [USP'U]/ML
7500 INJECTION, SOLUTION INTRAVENOUS; SUBCUTANEOUS EVERY 8 HOURS SCHEDULED
Status: DISCONTINUED | OUTPATIENT
Start: 2024-02-16 | End: 2024-02-20

## 2024-02-16 RX ORDER — ONDANSETRON 4 MG/1
4 TABLET, ORALLY DISINTEGRATING ORAL EVERY 8 HOURS PRN
Status: DISCONTINUED | OUTPATIENT
Start: 2024-02-16 | End: 2024-02-21 | Stop reason: HOSPADM

## 2024-02-16 RX ORDER — ALPRAZOLAM 0.25 MG/1
0.25 TABLET ORAL NIGHTLY PRN
Status: DISCONTINUED | OUTPATIENT
Start: 2024-02-16 | End: 2024-02-16

## 2024-02-16 RX ORDER — FENTANYL CITRATE 50 UG/ML
25 INJECTION, SOLUTION INTRAMUSCULAR; INTRAVENOUS EVERY 5 MIN PRN
Status: DISCONTINUED | OUTPATIENT
Start: 2024-02-16 | End: 2024-02-16 | Stop reason: HOSPADM

## 2024-02-16 RX ORDER — FENTANYL CITRATE 50 UG/ML
INJECTION, SOLUTION INTRAMUSCULAR; INTRAVENOUS PRN
Status: DISCONTINUED | OUTPATIENT
Start: 2024-02-16 | End: 2024-02-16 | Stop reason: SDUPTHER

## 2024-02-16 RX ORDER — ACETAMINOPHEN 160 MG/5ML
1000 LIQUID ORAL EVERY 8 HOURS PRN
Status: DISCONTINUED | OUTPATIENT
Start: 2024-02-16 | End: 2024-02-16

## 2024-02-16 RX ORDER — ONDANSETRON 4 MG/1
4 TABLET, ORALLY DISINTEGRATING ORAL EVERY 8 HOURS PRN
Status: DISCONTINUED | OUTPATIENT
Start: 2024-02-16 | End: 2024-02-16

## 2024-02-16 RX ORDER — KETAMINE HCL IN NACL, ISO-OSM 100MG/10ML
SYRINGE (ML) INJECTION PRN
Status: DISCONTINUED | OUTPATIENT
Start: 2024-02-16 | End: 2024-02-16 | Stop reason: SDUPTHER

## 2024-02-16 RX ORDER — INSULIN LISPRO 100 [IU]/ML
0-4 INJECTION, SOLUTION INTRAVENOUS; SUBCUTANEOUS NIGHTLY
Status: DISCONTINUED | OUTPATIENT
Start: 2024-02-16 | End: 2024-02-16 | Stop reason: SDUPTHER

## 2024-02-16 RX ORDER — SODIUM CHLORIDE 0.9 % (FLUSH) 0.9 %
5-40 SYRINGE (ML) INJECTION EVERY 12 HOURS SCHEDULED
Status: DISCONTINUED | OUTPATIENT
Start: 2024-02-16 | End: 2024-02-20

## 2024-02-16 RX ORDER — ONDANSETRON 2 MG/ML
4 INJECTION INTRAMUSCULAR; INTRAVENOUS EVERY 6 HOURS PRN
Status: DISCONTINUED | OUTPATIENT
Start: 2024-02-16 | End: 2024-02-16

## 2024-02-16 RX ORDER — ACETAMINOPHEN 500 MG
1000 TABLET ORAL EVERY 8 HOURS SCHEDULED
Status: DISCONTINUED | OUTPATIENT
Start: 2024-02-16 | End: 2024-02-20

## 2024-02-16 RX ORDER — ONDANSETRON 2 MG/ML
4 INJECTION INTRAMUSCULAR; INTRAVENOUS EVERY 6 HOURS PRN
Status: DISCONTINUED | OUTPATIENT
Start: 2024-02-16 | End: 2024-02-21 | Stop reason: HOSPADM

## 2024-02-16 RX ORDER — EPHEDRINE SULFATE/0.9% NACL/PF 50 MG/5 ML
SYRINGE (ML) INTRAVENOUS PRN
Status: DISCONTINUED | OUTPATIENT
Start: 2024-02-16 | End: 2024-02-16 | Stop reason: SDUPTHER

## 2024-02-16 RX ORDER — SENNOSIDES A AND B 8.6 MG/1
1 TABLET, FILM COATED ORAL 2 TIMES DAILY
Status: DISCONTINUED | OUTPATIENT
Start: 2024-02-16 | End: 2024-02-20

## 2024-02-16 RX ORDER — LEVOTHYROXINE SODIUM 0.1 MG/1
200 TABLET ORAL DAILY
Status: DISCONTINUED | OUTPATIENT
Start: 2024-02-17 | End: 2024-02-16

## 2024-02-16 RX ORDER — SODIUM CHLORIDE 9 MG/ML
INJECTION, SOLUTION INTRAVENOUS PRN
Status: DISCONTINUED | OUTPATIENT
Start: 2024-02-16 | End: 2024-02-20

## 2024-02-16 RX ORDER — FERROUS SULFATE 300 MG/5ML
300 LIQUID (ML) ORAL DAILY
Status: DISCONTINUED | OUTPATIENT
Start: 2024-02-16 | End: 2024-02-20

## 2024-02-16 RX ORDER — SODIUM CHLORIDE, SODIUM LACTATE, POTASSIUM CHLORIDE, CALCIUM CHLORIDE 600; 310; 30; 20 MG/100ML; MG/100ML; MG/100ML; MG/100ML
INJECTION, SOLUTION INTRAVENOUS CONTINUOUS
Status: DISCONTINUED | OUTPATIENT
Start: 2024-02-16 | End: 2024-02-16

## 2024-02-16 RX ORDER — VITAMIN B COMPLEX
5000 TABLET ORAL DAILY
Status: DISCONTINUED | OUTPATIENT
Start: 2024-02-16 | End: 2024-02-20

## 2024-02-16 RX ORDER — ALPRAZOLAM 0.25 MG/1
0.25 TABLET ORAL NIGHTLY PRN
Status: DISCONTINUED | OUTPATIENT
Start: 2024-02-16 | End: 2024-02-18

## 2024-02-16 RX ORDER — NOREPINEPHRINE BITARTRATE 0.06 MG/ML
1-100 INJECTION, SOLUTION INTRAVENOUS CONTINUOUS
Status: DISCONTINUED | OUTPATIENT
Start: 2024-02-16 | End: 2024-02-20

## 2024-02-16 RX ORDER — LIDOCAINE HYDROCHLORIDE 40 MG/ML
4 INJECTION, SOLUTION RETROBULBAR; TOPICAL
Status: DISCONTINUED | OUTPATIENT
Start: 2024-02-16 | End: 2024-02-16

## 2024-02-16 RX ORDER — ASPIRIN 81 MG/1
81 TABLET, CHEWABLE ORAL DAILY
Status: DISCONTINUED | OUTPATIENT
Start: 2024-02-16 | End: 2024-02-16

## 2024-02-16 RX ORDER — SODIUM CHLORIDE 0.9 % (FLUSH) 0.9 %
5-40 SYRINGE (ML) INJECTION PRN
Status: DISCONTINUED | OUTPATIENT
Start: 2024-02-16 | End: 2024-02-16

## 2024-02-16 RX ORDER — OXYCODONE HYDROCHLORIDE 5 MG/1
5 TABLET ORAL EVERY 6 HOURS PRN
Status: DISCONTINUED | OUTPATIENT
Start: 2024-02-16 | End: 2024-02-16

## 2024-02-16 RX ORDER — FENTANYL CITRATE 50 UG/ML
50 INJECTION, SOLUTION INTRAMUSCULAR; INTRAVENOUS
Status: DISCONTINUED | OUTPATIENT
Start: 2024-02-16 | End: 2024-02-16

## 2024-02-16 RX ORDER — ALBUTEROL SULFATE 2.5 MG/3ML
2.5 SOLUTION RESPIRATORY (INHALATION) EVERY 6 HOURS PRN
Status: DISCONTINUED | OUTPATIENT
Start: 2024-02-16 | End: 2024-02-20

## 2024-02-16 RX ORDER — AMIODARONE HYDROCHLORIDE 200 MG/1
200 TABLET ORAL DAILY
Status: DISCONTINUED | OUTPATIENT
Start: 2024-02-16 | End: 2024-02-16

## 2024-02-16 RX ORDER — DEXTROSE MONOHYDRATE 100 MG/ML
INJECTION, SOLUTION INTRAVENOUS CONTINUOUS PRN
Status: DISCONTINUED | OUTPATIENT
Start: 2024-02-16 | End: 2024-02-20

## 2024-02-16 RX ORDER — HEPARIN SODIUM 5000 [USP'U]/ML
7500 INJECTION, SOLUTION INTRAVENOUS; SUBCUTANEOUS EVERY 8 HOURS SCHEDULED
Status: DISCONTINUED | OUTPATIENT
Start: 2024-02-16 | End: 2024-02-16

## 2024-02-16 RX ORDER — OXYCODONE HYDROCHLORIDE 5 MG/1
5 TABLET ORAL EVERY 6 HOURS PRN
Status: DISCONTINUED | OUTPATIENT
Start: 2024-02-16 | End: 2024-02-20

## 2024-02-16 RX ORDER — ONDANSETRON 2 MG/ML
4 INJECTION INTRAMUSCULAR; INTRAVENOUS
Status: DISCONTINUED | OUTPATIENT
Start: 2024-02-16 | End: 2024-02-16 | Stop reason: HOSPADM

## 2024-02-16 RX ORDER — FUROSEMIDE 10 MG/ML
40 INJECTION INTRAMUSCULAR; INTRAVENOUS ONCE
Status: COMPLETED | OUTPATIENT
Start: 2024-02-16 | End: 2024-02-16

## 2024-02-16 RX ORDER — ALBUTEROL SULFATE 2.5 MG/3ML
2.5 SOLUTION RESPIRATORY (INHALATION)
Status: DISCONTINUED | OUTPATIENT
Start: 2024-02-16 | End: 2024-02-16

## 2024-02-16 RX ORDER — NICOTINE POLACRILEX 4 MG
15 LOZENGE BUCCAL DAILY PRN
Status: DISCONTINUED | OUTPATIENT
Start: 2024-02-16 | End: 2024-02-20

## 2024-02-16 RX ORDER — ALBUMIN, HUMAN INJ 5% 5 %
25 SOLUTION INTRAVENOUS ONCE
Status: COMPLETED | OUTPATIENT
Start: 2024-02-16 | End: 2024-02-16

## 2024-02-16 RX ORDER — ASPIRIN 81 MG/1
81 TABLET ORAL DAILY
Status: DISCONTINUED | OUTPATIENT
Start: 2024-02-16 | End: 2024-02-20

## 2024-02-16 RX ORDER — GINSENG 100 MG
CAPSULE ORAL PRN
Status: DISCONTINUED | OUTPATIENT
Start: 2024-02-16 | End: 2024-02-16 | Stop reason: ALTCHOICE

## 2024-02-16 RX ORDER — SODIUM CHLORIDE 0.9 % (FLUSH) 0.9 %
5-40 SYRINGE (ML) INJECTION EVERY 12 HOURS SCHEDULED
Status: DISCONTINUED | OUTPATIENT
Start: 2024-02-16 | End: 2024-02-16 | Stop reason: HOSPADM

## 2024-02-16 RX ORDER — FENTANYL CITRATE 50 UG/ML
50 INJECTION, SOLUTION INTRAMUSCULAR; INTRAVENOUS
Status: DISCONTINUED | OUTPATIENT
Start: 2024-02-16 | End: 2024-02-20

## 2024-02-16 RX ORDER — MAGNESIUM HYDROXIDE 1200 MG/15ML
LIQUID ORAL CONTINUOUS PRN
Status: COMPLETED | OUTPATIENT
Start: 2024-02-16 | End: 2024-02-16

## 2024-02-16 RX ORDER — MIDAZOLAM HYDROCHLORIDE 1 MG/ML
INJECTION INTRAMUSCULAR; INTRAVENOUS PRN
Status: DISCONTINUED | OUTPATIENT
Start: 2024-02-16 | End: 2024-02-16 | Stop reason: SDUPTHER

## 2024-02-16 RX ORDER — MIDODRINE HYDROCHLORIDE 5 MG/1
5 TABLET ORAL 3 TIMES DAILY
Status: DISCONTINUED | OUTPATIENT
Start: 2024-02-16 | End: 2024-02-17

## 2024-02-16 RX ORDER — CITALOPRAM HYDROBROMIDE 10 MG/1
30 TABLET ORAL DAILY
Status: DISCONTINUED | OUTPATIENT
Start: 2024-02-16 | End: 2024-02-20

## 2024-02-16 RX ORDER — ACETAMINOPHEN 500 MG
1000 TABLET ORAL EVERY 8 HOURS SCHEDULED
Status: DISCONTINUED | OUTPATIENT
Start: 2024-02-16 | End: 2024-02-16 | Stop reason: SDUPTHER

## 2024-02-16 RX ORDER — PROCHLORPERAZINE EDISYLATE 5 MG/ML
5 INJECTION INTRAMUSCULAR; INTRAVENOUS
Status: DISCONTINUED | OUTPATIENT
Start: 2024-02-16 | End: 2024-02-16 | Stop reason: HOSPADM

## 2024-02-16 RX ORDER — METHOCARBAMOL 750 MG/1
750 TABLET, FILM COATED ORAL 4 TIMES DAILY
Status: DISCONTINUED | OUTPATIENT
Start: 2024-02-16 | End: 2024-02-16

## 2024-02-16 RX ORDER — LANSOPRAZOLE 30 MG/1
30 TABLET, ORALLY DISINTEGRATING, DELAYED RELEASE ORAL
Status: DISCONTINUED | OUTPATIENT
Start: 2024-02-17 | End: 2024-02-16

## 2024-02-16 RX ORDER — MIDODRINE HYDROCHLORIDE 5 MG/1
5 TABLET ORAL
Status: DISCONTINUED | OUTPATIENT
Start: 2024-02-17 | End: 2024-02-16

## 2024-02-16 RX ORDER — SODIUM CHLORIDE 9 MG/ML
INJECTION, SOLUTION INTRAVENOUS CONTINUOUS PRN
Status: DISCONTINUED | OUTPATIENT
Start: 2024-02-16 | End: 2024-02-16 | Stop reason: SDUPTHER

## 2024-02-16 RX ORDER — SODIUM CHLORIDE 0.9 % (FLUSH) 0.9 %
5-40 SYRINGE (ML) INJECTION PRN
Status: DISCONTINUED | OUTPATIENT
Start: 2024-02-16 | End: 2024-02-20

## 2024-02-16 RX ORDER — FOLIC ACID 1 MG/1
1 TABLET ORAL DAILY
Status: DISCONTINUED | OUTPATIENT
Start: 2024-02-16 | End: 2024-02-20

## 2024-02-16 RX ORDER — ALBUMIN, HUMAN INJ 5% 5 %
SOLUTION INTRAVENOUS PRN
Status: DISCONTINUED | OUTPATIENT
Start: 2024-02-16 | End: 2024-02-16 | Stop reason: SDUPTHER

## 2024-02-16 RX ORDER — SENNOSIDES 8.8 MG/5ML
5 LIQUID ORAL 2 TIMES DAILY
Status: DISCONTINUED | OUTPATIENT
Start: 2024-02-16 | End: 2024-02-16

## 2024-02-16 RX ORDER — SODIUM CHLORIDE, SODIUM LACTATE, POTASSIUM CHLORIDE, CALCIUM CHLORIDE 600; 310; 30; 20 MG/100ML; MG/100ML; MG/100ML; MG/100ML
INJECTION, SOLUTION INTRAVENOUS CONTINUOUS
Status: DISCONTINUED | OUTPATIENT
Start: 2024-02-16 | End: 2024-02-16 | Stop reason: HOSPADM

## 2024-02-16 RX ORDER — ENOXAPARIN SODIUM 100 MG/ML
40 INJECTION SUBCUTANEOUS 2 TIMES DAILY
Status: DISCONTINUED | OUTPATIENT
Start: 2024-02-16 | End: 2024-02-16 | Stop reason: SDUPTHER

## 2024-02-16 RX ORDER — INSULIN LISPRO 100 [IU]/ML
0-16 INJECTION, SOLUTION INTRAVENOUS; SUBCUTANEOUS EVERY 4 HOURS
Status: DISCONTINUED | OUTPATIENT
Start: 2024-02-16 | End: 2024-02-16

## 2024-02-16 RX ORDER — GABAPENTIN 300 MG/1
300 CAPSULE ORAL 3 TIMES DAILY
Status: DISCONTINUED | OUTPATIENT
Start: 2024-02-16 | End: 2024-02-16

## 2024-02-16 RX ORDER — OXYCODONE HYDROCHLORIDE 5 MG/1
10 TABLET ORAL EVERY 6 HOURS PRN
Status: DISCONTINUED | OUTPATIENT
Start: 2024-02-16 | End: 2024-02-16

## 2024-02-16 RX ORDER — INSULIN LISPRO 100 [IU]/ML
0-16 INJECTION, SOLUTION INTRAVENOUS; SUBCUTANEOUS
Status: DISCONTINUED | OUTPATIENT
Start: 2024-02-16 | End: 2024-02-19

## 2024-02-16 RX ORDER — AMIODARONE HYDROCHLORIDE 200 MG/1
200 TABLET ORAL DAILY
Status: DISCONTINUED | OUTPATIENT
Start: 2024-02-16 | End: 2024-02-20

## 2024-02-16 RX ORDER — PANTOPRAZOLE SODIUM 40 MG/1
40 TABLET, DELAYED RELEASE ORAL DAILY
Status: DISCONTINUED | OUTPATIENT
Start: 2024-02-16 | End: 2024-02-18

## 2024-02-16 RX ORDER — LEVOTHYROXINE SODIUM 0.1 MG/1
200 TABLET ORAL DAILY
Status: DISCONTINUED | OUTPATIENT
Start: 2024-02-16 | End: 2024-02-20

## 2024-02-16 RX ORDER — WOUND DRESSING ADHESIVE - LIQUID
LIQUID MISCELLANEOUS PRN
Status: DISCONTINUED | OUTPATIENT
Start: 2024-02-16 | End: 2024-02-16 | Stop reason: ALTCHOICE

## 2024-02-16 RX ADMIN — Medication 10 MG: at 10:17

## 2024-02-16 RX ADMIN — Medication 20 MG: at 10:21

## 2024-02-16 RX ADMIN — Medication 10 MG: at 10:28

## 2024-02-16 RX ADMIN — GABAPENTIN 300 MG: 300 CAPSULE ORAL at 15:18

## 2024-02-16 RX ADMIN — HEPARIN SODIUM 7500 UNITS: 5000 INJECTION INTRAVENOUS; SUBCUTANEOUS at 22:15

## 2024-02-16 RX ADMIN — PHENYLEPHRINE HYDROCHLORIDE 200 MCG: 10 INJECTION INTRAVENOUS at 10:32

## 2024-02-16 RX ADMIN — FENTANYL CITRATE 50 MCG: 50 INJECTION INTRAMUSCULAR; INTRAVENOUS at 22:14

## 2024-02-16 RX ADMIN — Medication 10 MG: at 10:35

## 2024-02-16 RX ADMIN — ALBUMIN (HUMAN) 25 G: 12.5 SOLUTION INTRAVENOUS at 11:17

## 2024-02-16 RX ADMIN — PHENYLEPHRINE HYDROCHLORIDE 100 MCG: 10 INJECTION INTRAVENOUS at 10:44

## 2024-02-16 RX ADMIN — PHENYLEPHRINE HYDROCHLORIDE 50 MCG/MIN: 10 INJECTION INTRAVENOUS at 10:39

## 2024-02-16 RX ADMIN — FUROSEMIDE 5 MG/HR: 10 INJECTION, SOLUTION INTRAMUSCULAR; INTRAVENOUS at 20:23

## 2024-02-16 RX ADMIN — MIDAZOLAM 0.5 MG: 1 INJECTION INTRAMUSCULAR; INTRAVENOUS at 10:19

## 2024-02-16 RX ADMIN — ACETAMINOPHEN 1000 MG: 500 TABLET ORAL at 20:30

## 2024-02-16 RX ADMIN — Medication 10 MG: at 10:46

## 2024-02-16 RX ADMIN — Medication 10 MG: at 10:54

## 2024-02-16 RX ADMIN — SODIUM CHLORIDE: 9 INJECTION, SOLUTION INTRAVENOUS at 09:59

## 2024-02-16 RX ADMIN — VANCOMYCIN HYDROCHLORIDE 2000 MG: 1 INJECTION, POWDER, LYOPHILIZED, FOR SOLUTION INTRAVENOUS at 23:18

## 2024-02-16 RX ADMIN — FUROSEMIDE 40 MG: 10 INJECTION, SOLUTION INTRAMUSCULAR; INTRAVENOUS at 20:00

## 2024-02-16 RX ADMIN — SODIUM CHLORIDE, PRESERVATIVE FREE 10 ML: 5 INJECTION INTRAVENOUS at 20:00

## 2024-02-16 RX ADMIN — ALBUMIN (HUMAN) 25 G: 12.5 INJECTION, SOLUTION INTRAVENOUS at 12:07

## 2024-02-16 RX ADMIN — DOCUSATE SODIUM LIQUID 100 MG: 100 LIQUID ORAL at 20:29

## 2024-02-16 RX ADMIN — OXYCODONE 5 MG: 5 TABLET ORAL at 20:30

## 2024-02-16 RX ADMIN — Medication 10 MG: at 10:26

## 2024-02-16 RX ADMIN — MIDODRINE HYDROCHLORIDE 5 MG: 5 TABLET ORAL at 20:30

## 2024-02-16 RX ADMIN — SODIUM CHLORIDE, POTASSIUM CHLORIDE, SODIUM LACTATE AND CALCIUM CHLORIDE: 600; 310; 30; 20 INJECTION, SOLUTION INTRAVENOUS at 13:04

## 2024-02-16 RX ADMIN — ACETAMINOPHEN 1000 MG: 500 TABLET ORAL at 15:17

## 2024-02-16 RX ADMIN — MIDAZOLAM 0.5 MG: 1 INJECTION INTRAMUSCULAR; INTRAVENOUS at 10:23

## 2024-02-16 RX ADMIN — VASOPRESSIN 0.04 UNITS/MIN: 0.2 INJECTION INTRAVENOUS at 19:10

## 2024-02-16 RX ADMIN — MIDAZOLAM 1 MG: 1 INJECTION INTRAMUSCULAR; INTRAVENOUS at 10:09

## 2024-02-16 RX ADMIN — Medication 15 MCG/MIN: at 18:55

## 2024-02-16 RX ADMIN — FENTANYL CITRATE 25 MCG: 50 INJECTION, SOLUTION INTRAMUSCULAR; INTRAVENOUS at 10:50

## 2024-02-16 RX ADMIN — CITALOPRAM HYDROBROMIDE 30 MG: 10 TABLET ORAL at 20:32

## 2024-02-16 RX ADMIN — FENTANYL CITRATE 25 MCG: 50 INJECTION, SOLUTION INTRAMUSCULAR; INTRAVENOUS at 10:54

## 2024-02-16 RX ADMIN — SENNOSIDES 8.6 MG: 8.6 TABLET, FILM COATED ORAL at 20:30

## 2024-02-16 RX ADMIN — PHENYLEPHRINE HYDROCHLORIDE 100 MCG: 10 INJECTION INTRAVENOUS at 10:07

## 2024-02-16 RX ADMIN — PHENYLEPHRINE HYDROCHLORIDE 200 MCG: 10 INJECTION INTRAVENOUS at 10:59

## 2024-02-16 RX ADMIN — SODIUM CHLORIDE, POTASSIUM CHLORIDE, SODIUM LACTATE AND CALCIUM CHLORIDE: 600; 310; 30; 20 INJECTION, SOLUTION INTRAVENOUS at 09:59

## 2024-02-16 RX ADMIN — FENTANYL CITRATE 50 MCG: 50 INJECTION, SOLUTION INTRAMUSCULAR; INTRAVENOUS at 10:24

## 2024-02-16 RX ADMIN — PHENYLEPHRINE HYDROCHLORIDE 100 MCG: 10 INJECTION INTRAVENOUS at 10:10

## 2024-02-16 ASSESSMENT — PAIN DESCRIPTION - LOCATION
LOCATION: GENERALIZED
LOCATION: GENERALIZED
LOCATION: PERINEUM
LOCATION: ABDOMEN;BACK;LEG
LOCATION: GENERALIZED
LOCATION: GENERALIZED

## 2024-02-16 ASSESSMENT — PAIN - FUNCTIONAL ASSESSMENT
PAIN_FUNCTIONAL_ASSESSMENT: PREVENTS OR INTERFERES SOME ACTIVE ACTIVITIES AND ADLS
PAIN_FUNCTIONAL_ASSESSMENT: 0-10
PAIN_FUNCTIONAL_ASSESSMENT: PREVENTS OR INTERFERES SOME ACTIVE ACTIVITIES AND ADLS

## 2024-02-16 ASSESSMENT — PAIN SCALES - GENERAL
PAINLEVEL_OUTOF10: 2
PAINLEVEL_OUTOF10: 5
PAINLEVEL_OUTOF10: 7

## 2024-02-16 ASSESSMENT — PAIN DESCRIPTION - PAIN TYPE: TYPE: CHRONIC PAIN;SURGICAL PAIN

## 2024-02-16 ASSESSMENT — PAIN DESCRIPTION - DESCRIPTORS
DESCRIPTORS: DISCOMFORT
DESCRIPTORS: DISCOMFORT;TIGHTNESS;SORE
DESCRIPTORS: DISCOMFORT

## 2024-02-16 ASSESSMENT — PAIN DESCRIPTION - FREQUENCY: FREQUENCY: INTERMITTENT

## 2024-02-16 NOTE — OP NOTE
Catheter Care Suction Canister/Tubing changed 01/13/24 0528   Perineal Care Yes 01/13/24 0528   Suction 40 mmgHg continuous 01/13/24 0528   Urine Color Yellow 01/13/24 0528   Urine Appearance Clear 01/13/24 0528   Output (mL) 200 mL 01/14/24 1200       Findings: Healthy granulation covering the entire wound bed with full-thickness coverage on the left lateral and inferior aspect.  The superior right 8 x 10 cm segment is thinner requiring more granulation tissue coverage, this was debrided of fibrinous tissue.  The entire wound bed measures 40 x 50 cm.  Wound VAC replaced.      Detailed Description of Procedure:   The patient is a 58-year-old male with history of necrotizing fasciitis with a large abdominal wound that has required multiple takeback to the OR for debridement and skin substitute placement with wound VAC exchange.  He presents today for the same.  The procedure, alternatives, risk were discussed with the patient all questions were answered.  The consent was reviewed, signed, witnessed, placed in the patient's chart.    The patient was seen to the operating room and kept on his hospital bed.  General anesthesia was induced under anesthesia guidelines using the patient's tracheostomy.  The wound VAC was taken down.  The wound measured 40 x 50 cm and it was covered and healthy granulation tissue throughout.  There is a 8 x 10 cm segment on the right upper aspect that required sharp debridement of fibrinous tissue.  The wound was then irrigated with sterile saline.  We then proceeded with wound VAC placement.  The granulation tissue in wound bed is soft was covered with Adaptic covered with bacitracin.  The skin surrounding the wound bed was covered with DuoDERM.  The wound was then covered with black foam and a wound VAC set to -125 mmHg.  The wound VAC had good suction while in the OR on completion of the case.    This concluded the procedure, all sponges and instruments were accounted for at the end of

## 2024-02-16 NOTE — H&P
History and Physical    PATIENT NAME: Ruben Dimas  AGE: 58 y.o.  MEDICAL RECORD NO. [id number]  DATE: 2/16/2024  SURGEON: Dr. Monte  PRIMARY CARE PHYSICIAN: Ramy Lazo MD      IMPRESSION:     Patient Active Problem List   Diagnosis    Alcoholic cirrhosis of liver (HCC), sober since 2013    Peripheral edema    Bipolar disorder (HCC)    Type 2 diabetes mellitus with diabetic neuropathy, with long-term current use of insulin (HCC)    Essential hypertension, currently hypotensive    Dyslipidemia    Weakness    Acute metabolic encephalopathy    FABI (obstructive sleep apnea)    Primary osteoarthritis of right knee    Type 2 diabetes mellitus with diabetic neuropathy (HCC)    Acquired hypothyroidism    Urine retention    Anemia    Slow transit constipation    Iron deficiency anemia due to chronic blood loss    Gastroesophageal reflux disease without esophagitis    LEONARDA (acute kidney injury) (HCC)    Secondary esophageal varices without bleeding (HCC)    Portal hypertension (HCC)    Obesity, morbid, BMI 50 or higher (HCC)    Venous stasis dermatitis of both lower extremities    Cellulitis of perineum    Chronic indwelling Clemons catheter    Anxiety and depression    Back pain, chronic    BPH (benign prostatic hyperplasia)    Chronic diastolic CHF (congestive heart failure) (HCC)    Cirrhosis (HCC)    Diabetes mellitus (HCC)    GERD (gastroesophageal reflux disease)    Hepatitis    Hiatal hernia    Hypertension, essential    IBS (irritable bowel syndrome)    Morbid obesity with BMI of 45.0-49.9, adult (HCC)    Neuropathy    Chronic respiratory failure with hypoxia, on home oxygen therapy (HCC)    Sleep apnea    Thyroid disease    Urinary bladder neurogenic dysfunction    Acute UTI    Musculoskeletal immobility    Pyocystis    Myoclonic jerking    Thrombocytopenia (HCC)    Hypotension    Sepsis with acute hypercapnic respiratory failure and septic shock (HCC)    Right lower lobe pneumonia    Acute kidney injury

## 2024-02-16 NOTE — ANESTHESIA PRE PROCEDURE
01/21/2022 04:44 AM    AGRATIO 0.6 01/29/2024 06:49 AM    LABGLOM 25 02/15/2024 03:20 AM    GLUCOSE 88 02/15/2024 03:20 AM    GLUCOSE 102 08/30/2011 02:20 PM    PROT 5.0 02/15/2024 03:20 AM    CALCIUM 9.4 02/15/2024 03:20 AM    BILITOT 1.5 02/15/2024 03:20 AM    ALKPHOS 1,429 02/15/2024 03:20 AM     02/15/2024 03:20 AM    ALT 64 02/15/2024 03:20 AM       POC Tests: No results for input(s): \"POCGLU\", \"POCNA\", \"POCK\", \"POCCL\", \"POCBUN\", \"POCHEMO\", \"POCHCT\" in the last 72 hours.    Coags:   Lab Results   Component Value Date/Time    PROTIME 13.3 01/08/2024 08:29 AM    PROTIME 11.6 03/30/2012 09:23 AM    INR 1.0 01/08/2024 08:29 AM    APTT 29.2 02/12/2024 10:35 AM       HCG (If Applicable): No results found for: \"PREGTESTUR\", \"PREGSERUM\", \"HCG\", \"HCGQUANT\"     ABGs:   Lab Results   Component Value Date/Time    PHART 7.197 01/21/2024 03:13 PM    PO2ART 61.7 01/21/2024 03:13 PM    DOA9IWM 41.2 01/21/2024 03:13 PM    IEQ8WBN 15.4 01/21/2024 03:13 PM    L0BYPDKZ 89.4 01/21/2024 03:13 PM        Type & Screen (If Applicable):  No results found for: \"LABABO\", \"LABRH\"    Drug/Infectious Status (If Applicable):  Lab Results   Component Value Date/Time    HEPCAB NONREACTIVE 06/19/2014 05:57 PM       COVID-19 Screening (If Applicable):   Lab Results   Component Value Date/Time    COVID19 Not Detected 01/01/2024 07:51 AM           Anesthesia Evaluation  Patient summary reviewed and Nursing notes reviewed   no history of anesthetic complications:   Airway: Mallampati: Unable to assess / NA       Comment: Trach collar in situ  Cuffed trach in situ    Tracheostomy present Dental:          Pulmonary:normal exam  breath sounds clear to auscultation  (+) pneumonia:  COPD:    sleep apnea:                                 ROS comment: Chronic respiratory failure with hypoxia, on home oxygen therapy (HCC)   Cardiovascular:    (+) hypertension:, dysrhythmias: atrial fibrillation, CHF:        Rhythm: regular  Rate: normal

## 2024-02-17 ENCOUNTER — APPOINTMENT (OUTPATIENT)
Age: 59
DRG: 853 | End: 2024-02-17
Attending: SURGERY
Payer: MEDICARE

## 2024-02-17 PROBLEM — N17.9 ACUTE KIDNEY INJURY SUPERIMPOSED ON CKD (HCC): Status: ACTIVE | Noted: 2024-02-17

## 2024-02-17 PROBLEM — N17.0 ACUTE RENAL FAILURE WITH TUBULAR NECROSIS (HCC): Status: ACTIVE | Noted: 2021-11-02

## 2024-02-17 PROBLEM — N17.0 ATN (ACUTE TUBULAR NECROSIS) (HCC): Status: ACTIVE | Noted: 2024-02-17

## 2024-02-17 PROBLEM — J96.01 ACUTE HYPOXIC RESPIRATORY FAILURE (HCC): Status: ACTIVE | Noted: 2024-02-17

## 2024-02-17 PROBLEM — N18.9 ACUTE KIDNEY INJURY SUPERIMPOSED ON CKD (HCC): Status: ACTIVE | Noted: 2024-02-17

## 2024-02-17 PROBLEM — A49.8 PSEUDOMONAS AERUGINOSA INFECTION: Status: ACTIVE | Noted: 2024-02-17

## 2024-02-17 PROBLEM — Z16.24 MULTIPLE DRUG RESISTANT ORGANISM (MDRO) CULTURE POSITIVE: Status: ACTIVE | Noted: 2024-02-17

## 2024-02-17 LAB
ALLEN TEST: ABNORMAL
ANION GAP SERPL CALCULATED.3IONS-SCNC: 15 MMOL/L (ref 9–17)
ANION GAP SERPL CALCULATED.3IONS-SCNC: 17 MMOL/L (ref 9–17)
ANION GAP SERPL CALCULATED.3IONS-SCNC: 18 MMOL/L (ref 9–17)
BASOPHILS # BLD: 0 K/UL (ref 0–0.2)
BASOPHILS NFR BLD: 0 % (ref 0–2)
BNP SERPL-MCNC: ABNORMAL PG/ML
BUN SERPL-MCNC: 25 MG/DL (ref 6–20)
BUN SERPL-MCNC: 25 MG/DL (ref 6–20)
BUN SERPL-MCNC: 27 MG/DL (ref 6–20)
CALCIUM SERPL-MCNC: 8.8 MG/DL (ref 8.6–10.4)
CALCIUM SERPL-MCNC: 9.2 MG/DL (ref 8.6–10.4)
CALCIUM SERPL-MCNC: 9.4 MG/DL (ref 8.6–10.4)
CHLORIDE SERPL-SCNC: 101 MMOL/L (ref 98–107)
CHLORIDE SERPL-SCNC: 102 MMOL/L (ref 98–107)
CHLORIDE SERPL-SCNC: 103 MMOL/L (ref 98–107)
CO2 SERPL-SCNC: 13 MMOL/L (ref 20–31)
CO2 SERPL-SCNC: 13 MMOL/L (ref 20–31)
CO2 SERPL-SCNC: 14 MMOL/L (ref 20–31)
CREAT SERPL-MCNC: 3 MG/DL (ref 0.7–1.2)
CREAT SERPL-MCNC: 3 MG/DL (ref 0.7–1.2)
CREAT SERPL-MCNC: 3.3 MG/DL (ref 0.7–1.2)
CRP SERPL HS-MCNC: 230.8 MG/L (ref 0–5)
ECHO AV AREA PEAK VELOCITY: 1.2 CM2
ECHO AV AREA VTI: 1.2 CM2
ECHO AV AREA/BSA PEAK VELOCITY: 0.4 CM2/M2
ECHO AV AREA/BSA VTI: 0.4 CM2/M2
ECHO AV MEAN GRADIENT: 35 MMHG
ECHO AV MEAN VELOCITY: 2.8 M/S
ECHO AV PEAK GRADIENT: 68 MMHG
ECHO AV PEAK VELOCITY: 4.1 M/S
ECHO AV VELOCITY RATIO: 0.32
ECHO AV VTI: 67.4 CM
ECHO BSA: 2.92 M2
ECHO EST RA PRESSURE: 3 MMHG
ECHO LV EDV A4C: 133 ML
ECHO LV EDV INDEX A4C: 48 ML/M2
ECHO LV EJECTION FRACTION A4C: 45 %
ECHO LV ESV A4C: 73 ML
ECHO LV ESV INDEX A4C: 27 ML/M2
ECHO LVOT AREA: 3.8 CM2
ECHO LVOT AV VTI INDEX: 0.32
ECHO LVOT DIAM: 2.2 CM
ECHO LVOT MEAN GRADIENT: 4 MMHG
ECHO LVOT PEAK GRADIENT: 7 MMHG
ECHO LVOT PEAK VELOCITY: 1.3 M/S
ECHO LVOT STROKE VOLUME INDEX: 29.4 ML/M2
ECHO LVOT SV: 80.9 ML
ECHO LVOT VTI: 21.3 CM
ECHO MV AREA VTI: 2.3 CM2
ECHO MV LVOT VTI INDEX: 1.65
ECHO MV MAX VELOCITY: 1.5 M/S
ECHO MV MEAN GRADIENT: 5 MMHG
ECHO MV MEAN VELOCITY: 1 M/S
ECHO MV PEAK GRADIENT: 9 MMHG
ECHO MV VTI: 35.1 CM
ECHO PV MAX VELOCITY: 1.5 M/S
ECHO PV PEAK GRADIENT: 9 MMHG
ECHO RIGHT VENTRICULAR SYSTOLIC PRESSURE (RVSP): 58 MMHG
ECHO RV TAPSE: 2.1 CM (ref 1.7–?)
ECHO TV REGURGITANT MAX VELOCITY: 3.72 M/S
ECHO TV REGURGITANT PEAK GRADIENT: 55 MMHG
EOSINOPHIL # BLD: 0 K/UL (ref 0–0.4)
EOSINOPHILS RELATIVE PERCENT: 0 % (ref 1–4)
ERYTHROCYTE [DISTWIDTH] IN BLOOD BY AUTOMATED COUNT: 20 % (ref 11.8–14.4)
FIO2: 40
GFR SERPL CREATININE-BSD FRML MDRD: 21 ML/MIN/1.73M2
GFR SERPL CREATININE-BSD FRML MDRD: 23 ML/MIN/1.73M2
GFR SERPL CREATININE-BSD FRML MDRD: 23 ML/MIN/1.73M2
GLUCOSE BLD-MCNC: 132 MG/DL (ref 74–100)
GLUCOSE BLD-MCNC: 149 MG/DL (ref 74–100)
GLUCOSE BLD-MCNC: 189 MG/DL (ref 75–110)
GLUCOSE BLD-MCNC: 192 MG/DL (ref 74–100)
GLUCOSE SERPL-MCNC: 159 MG/DL (ref 70–99)
GLUCOSE SERPL-MCNC: 162 MG/DL (ref 70–99)
GLUCOSE SERPL-MCNC: 221 MG/DL (ref 70–99)
HCT VFR BLD AUTO: 25.7 % (ref 40.7–50.3)
HGB BLD-MCNC: 8.2 G/DL (ref 13–17)
IMM GRANULOCYTES # BLD AUTO: 0.27 K/UL (ref 0–0.3)
IMM GRANULOCYTES NFR BLD: 1 %
LACTIC ACID, WHOLE BLOOD: 3.4 MMOL/L (ref 0.7–2.1)
LYMPHOCYTES NFR BLD: 0.82 K/UL (ref 1–4.8)
LYMPHOCYTES RELATIVE PERCENT: 3 % (ref 24–44)
MAGNESIUM SERPL-MCNC: 1.8 MG/DL (ref 1.6–2.6)
MCH RBC QN AUTO: 29.3 PG (ref 25.2–33.5)
MCHC RBC AUTO-ENTMCNC: 31.9 G/DL (ref 28.4–34.8)
MCV RBC AUTO: 91.8 FL (ref 82.6–102.9)
MICROORGANISM SPEC CULT: ABNORMAL
MICROORGANISM SPEC CULT: ABNORMAL
MICROORGANISM/AGENT SPEC: ABNORMAL
MODE: ABNORMAL
MONOCYTES NFR BLD: 0.27 K/UL (ref 0.1–0.8)
MONOCYTES NFR BLD: 1 % (ref 1–7)
MORPHOLOGY: ABNORMAL
NEGATIVE BASE EXCESS, ART: 12.4 MMOL/L (ref 0–2)
NEGATIVE BASE EXCESS, ART: 16.7 MMOL/L (ref 0–2)
NEGATIVE BASE EXCESS, ART: 7.1 MMOL/L (ref 0–2)
NEUTROPHILS NFR BLD: 95 % (ref 36–66)
NEUTS SEG NFR BLD: 25.84 K/UL (ref 1.8–7.7)
NRBC BLD-RTO: 0.1 PER 100 WBC
NUCLEATED RED BLOOD CELLS: 1 PER 100 WBC
O2 DELIVERY DEVICE: ABNORMAL
PATIENT TEMP: 37.6
PATIENT TEMP: 37.6
PHOSPHATE SERPL-MCNC: 3.5 MG/DL (ref 2.5–4.5)
PLATELET # BLD AUTO: 156 K/UL (ref 138–453)
PMV BLD AUTO: 9 FL (ref 8.1–13.5)
POC HCO3: 10.2 MMOL/L (ref 21–28)
POC HCO3: 13.9 MMOL/L (ref 21–28)
POC HCO3: 18 MMOL/L (ref 21–28)
POC LACTIC ACID: 2.6 MMOL/L (ref 0.56–1.39)
POC LACTIC ACID: 3.3 MMOL/L (ref 0.56–1.39)
POC LACTIC ACID: 4.4 MMOL/L (ref 0.56–1.39)
POC O2 SATURATION: 99 % (ref 94–98)
POC O2 SATURATION: 99.3 % (ref 94–98)
POC O2 SATURATION: 99.7 % (ref 94–98)
POC PCO2 TEMP: 28.2 MM HG
POC PCO2 TEMP: 33.1 MM HG
POC PCO2: 27.5 MM HG (ref 35–48)
POC PCO2: 32.3 MM HG (ref 35–48)
POC PCO2: 33.3 MM HG (ref 35–48)
POC PH TEMP: 7.17
POC PH TEMP: 7.23
POC PH: 7.18 (ref 7.35–7.45)
POC PH: 7.24 (ref 7.35–7.45)
POC PH: 7.34 (ref 7.35–7.45)
POC PO2 TEMP: 166.7 MM HG
POC PO2 TEMP: 172 MM HG
POC PO2: 163.2 MM HG (ref 83–108)
POC PO2: 168.6 MM HG (ref 83–108)
POC PO2: 201.6 MM HG (ref 83–108)
POTASSIUM SERPL-SCNC: 4.3 MMOL/L (ref 3.7–5.3)
POTASSIUM SERPL-SCNC: 4.5 MMOL/L (ref 3.7–5.3)
POTASSIUM SERPL-SCNC: 4.7 MMOL/L (ref 3.7–5.3)
RBC # BLD AUTO: 2.8 M/UL (ref 4.21–5.77)
SAMPLE SITE: ABNORMAL
SERVICE CMNT-IMP: ABNORMAL
SODIUM SERPL-SCNC: 131 MMOL/L (ref 135–144)
SODIUM SERPL-SCNC: 132 MMOL/L (ref 135–144)
SODIUM SERPL-SCNC: 133 MMOL/L (ref 135–144)
SPECIMEN DESCRIPTION: ABNORMAL
TROPONIN I SERPL HS-MCNC: 121 NG/L (ref 0–22)
TROPONIN I SERPL HS-MCNC: 180 NG/L (ref 0–22)
TROPONIN I SERPL HS-MCNC: 232 NG/L (ref 0–22)
TROPONIN I SERPL HS-MCNC: 257 NG/L (ref 0–22)
VANCOMYCIN SERPL-MCNC: 15.3 UG/ML
WBC OTHER # BLD: 27.2 K/UL (ref 3.5–11.3)

## 2024-02-17 PROCEDURE — 87181 SC STD AGAR DILUTION PER AGT: CPT

## 2024-02-17 PROCEDURE — 6370000000 HC RX 637 (ALT 250 FOR IP): Performed by: NURSE PRACTITIONER

## 2024-02-17 PROCEDURE — 0BH17EZ INSERTION OF ENDOTRACHEAL AIRWAY INTO TRACHEA, VIA NATURAL OR ARTIFICIAL OPENING: ICD-10-PCS | Performed by: SURGERY

## 2024-02-17 PROCEDURE — 87077 CULTURE AEROBIC IDENTIFY: CPT

## 2024-02-17 PROCEDURE — 2580000003 HC RX 258: Performed by: NURSE PRACTITIONER

## 2024-02-17 PROCEDURE — 2580000003 HC RX 258

## 2024-02-17 PROCEDURE — 89220 SPUTUM SPECIMEN COLLECTION: CPT

## 2024-02-17 PROCEDURE — 2700000000 HC OXYGEN THERAPY PER DAY

## 2024-02-17 PROCEDURE — 87070 CULTURE OTHR SPECIMN AEROBIC: CPT

## 2024-02-17 PROCEDURE — 82947 ASSAY GLUCOSE BLOOD QUANT: CPT

## 2024-02-17 PROCEDURE — 87205 SMEAR GRAM STAIN: CPT

## 2024-02-17 PROCEDURE — 6360000002 HC RX W HCPCS

## 2024-02-17 PROCEDURE — 6360000002 HC RX W HCPCS: Performed by: INTERNAL MEDICINE

## 2024-02-17 PROCEDURE — 93325 DOPPLER ECHO COLOR FLOW MAPG: CPT | Performed by: INTERNAL MEDICINE

## 2024-02-17 PROCEDURE — 93308 TTE F-UP OR LMTD: CPT | Performed by: INTERNAL MEDICINE

## 2024-02-17 PROCEDURE — 2500000003 HC RX 250 WO HCPCS

## 2024-02-17 PROCEDURE — 82803 BLOOD GASES ANY COMBINATION: CPT

## 2024-02-17 PROCEDURE — 86140 C-REACTIVE PROTEIN: CPT

## 2024-02-17 PROCEDURE — 6360000002 HC RX W HCPCS: Performed by: SURGERY

## 2024-02-17 PROCEDURE — 2000000000 HC ICU R&B

## 2024-02-17 PROCEDURE — 83605 ASSAY OF LACTIC ACID: CPT

## 2024-02-17 PROCEDURE — 6360000002 HC RX W HCPCS: Performed by: NURSE PRACTITIONER

## 2024-02-17 PROCEDURE — 84100 ASSAY OF PHOSPHORUS: CPT

## 2024-02-17 PROCEDURE — 85025 COMPLETE CBC W/AUTO DIFF WBC: CPT

## 2024-02-17 PROCEDURE — 6360000002 HC RX W HCPCS: Performed by: STUDENT IN AN ORGANIZED HEALTH CARE EDUCATION/TRAINING PROGRAM

## 2024-02-17 PROCEDURE — 37799 UNLISTED PX VASCULAR SURGERY: CPT

## 2024-02-17 PROCEDURE — 2500000003 HC RX 250 WO HCPCS: Performed by: STUDENT IN AN ORGANIZED HEALTH CARE EDUCATION/TRAINING PROGRAM

## 2024-02-17 PROCEDURE — 99223 1ST HOSP IP/OBS HIGH 75: CPT | Performed by: INTERNAL MEDICINE

## 2024-02-17 PROCEDURE — C8924 2D TTE W OR W/O FOL W/CON,FU: HCPCS

## 2024-02-17 PROCEDURE — 2580000003 HC RX 258: Performed by: INTERNAL MEDICINE

## 2024-02-17 PROCEDURE — 6370000000 HC RX 637 (ALT 250 FOR IP): Performed by: STUDENT IN AN ORGANIZED HEALTH CARE EDUCATION/TRAINING PROGRAM

## 2024-02-17 PROCEDURE — 5A1945Z RESPIRATORY VENTILATION, 24-96 CONSECUTIVE HOURS: ICD-10-PCS | Performed by: SURGERY

## 2024-02-17 PROCEDURE — 94761 N-INVAS EAR/PLS OXIMETRY MLT: CPT

## 2024-02-17 PROCEDURE — 80048 BASIC METABOLIC PNL TOTAL CA: CPT

## 2024-02-17 PROCEDURE — 83735 ASSAY OF MAGNESIUM: CPT

## 2024-02-17 PROCEDURE — 99291 CRITICAL CARE FIRST HOUR: CPT | Performed by: INTERNAL MEDICINE

## 2024-02-17 PROCEDURE — 2580000003 HC RX 258: Performed by: SURGERY

## 2024-02-17 PROCEDURE — 94002 VENT MGMT INPAT INIT DAY: CPT

## 2024-02-17 PROCEDURE — 80202 ASSAY OF VANCOMYCIN: CPT

## 2024-02-17 PROCEDURE — 87186 SC STD MICRODIL/AGAR DIL: CPT

## 2024-02-17 PROCEDURE — 99233 SBSQ HOSP IP/OBS HIGH 50: CPT | Performed by: SURGERY

## 2024-02-17 PROCEDURE — 84484 ASSAY OF TROPONIN QUANT: CPT

## 2024-02-17 PROCEDURE — 2580000003 HC RX 258: Performed by: STUDENT IN AN ORGANIZED HEALTH CARE EDUCATION/TRAINING PROGRAM

## 2024-02-17 PROCEDURE — 83880 ASSAY OF NATRIURETIC PEPTIDE: CPT

## 2024-02-17 RX ORDER — FUROSEMIDE 10 MG/ML
INJECTION INTRAMUSCULAR; INTRAVENOUS
Status: COMPLETED
Start: 2024-02-17 | End: 2024-02-17

## 2024-02-17 RX ORDER — MIDODRINE HYDROCHLORIDE 5 MG/1
10 TABLET ORAL 3 TIMES DAILY
Status: DISCONTINUED | OUTPATIENT
Start: 2024-02-17 | End: 2024-02-20

## 2024-02-17 RX ORDER — FUROSEMIDE 10 MG/ML
40 INJECTION INTRAMUSCULAR; INTRAVENOUS ONCE
Status: COMPLETED | OUTPATIENT
Start: 2024-02-17 | End: 2024-02-17

## 2024-02-17 RX ADMIN — MIDODRINE HYDROCHLORIDE 10 MG: 5 TABLET ORAL at 20:15

## 2024-02-17 RX ADMIN — VASOPRESSIN 0.04 UNITS/MIN: 0.2 INJECTION INTRAVENOUS at 18:42

## 2024-02-17 RX ADMIN — OXYCODONE 5 MG: 5 TABLET ORAL at 21:15

## 2024-02-17 RX ADMIN — FUROSEMIDE 40 MG: 10 INJECTION INTRAMUSCULAR; INTRAVENOUS at 07:51

## 2024-02-17 RX ADMIN — DOCUSATE SODIUM LIQUID 100 MG: 100 LIQUID ORAL at 08:07

## 2024-02-17 RX ADMIN — ACETAMINOPHEN 1000 MG: 500 TABLET ORAL at 20:15

## 2024-02-17 RX ADMIN — CEFTAZIDIME, AVIBACTAM 1.25 G: 2; .5 POWDER, FOR SOLUTION INTRAVENOUS at 22:10

## 2024-02-17 RX ADMIN — HYDROCORTISONE SODIUM SUCCINATE 50 MG: 100 INJECTION, POWDER, FOR SOLUTION INTRAMUSCULAR; INTRAVENOUS at 18:43

## 2024-02-17 RX ADMIN — HEPARIN SODIUM 7500 UNITS: 5000 INJECTION INTRAVENOUS; SUBCUTANEOUS at 06:29

## 2024-02-17 RX ADMIN — CITALOPRAM HYDROBROMIDE 30 MG: 10 TABLET ORAL at 08:09

## 2024-02-17 RX ADMIN — Medication 5000 UNITS: at 08:09

## 2024-02-17 RX ADMIN — MIDODRINE HYDROCHLORIDE 10 MG: 5 TABLET ORAL at 13:36

## 2024-02-17 RX ADMIN — SODIUM CHLORIDE, PRESERVATIVE FREE 10 ML: 5 INJECTION INTRAVENOUS at 20:15

## 2024-02-17 RX ADMIN — SENNOSIDES 8.6 MG: 8.6 TABLET, FILM COATED ORAL at 08:08

## 2024-02-17 RX ADMIN — SODIUM BICARBONATE: 84 INJECTION INTRAVENOUS at 21:25

## 2024-02-17 RX ADMIN — OXYCODONE 5 MG: 5 TABLET ORAL at 13:36

## 2024-02-17 RX ADMIN — HEPARIN SODIUM 7500 UNITS: 5000 INJECTION INTRAVENOUS; SUBCUTANEOUS at 21:27

## 2024-02-17 RX ADMIN — PANTOPRAZOLE SODIUM 40 MG: 40 TABLET, DELAYED RELEASE ORAL at 08:08

## 2024-02-17 RX ADMIN — PIPERACILLIN AND TAZOBACTAM 3375 MG: 3; .375 INJECTION, POWDER, LYOPHILIZED, FOR SOLUTION INTRAVENOUS at 00:21

## 2024-02-17 RX ADMIN — VANCOMYCIN HYDROCHLORIDE 1500 MG: 1.5 INJECTION, POWDER, LYOPHILIZED, FOR SOLUTION INTRAVENOUS at 13:49

## 2024-02-17 RX ADMIN — SODIUM BICARBONATE 100 MEQ: 84 INJECTION, SOLUTION INTRAVENOUS at 10:12

## 2024-02-17 RX ADMIN — OXYCODONE 5 MG: 5 TABLET ORAL at 06:29

## 2024-02-17 RX ADMIN — FOLIC ACID 1 MG: 1 TABLET ORAL at 08:08

## 2024-02-17 RX ADMIN — FENTANYL CITRATE 50 MCG: 50 INJECTION INTRAMUSCULAR; INTRAVENOUS at 16:31

## 2024-02-17 RX ADMIN — ALPRAZOLAM 0.25 MG: 0.25 TABLET ORAL at 21:15

## 2024-02-17 RX ADMIN — AMIODARONE HYDROCHLORIDE 200 MG: 200 TABLET ORAL at 08:08

## 2024-02-17 RX ADMIN — ASPIRIN 81 MG: 81 TABLET, COATED ORAL at 08:08

## 2024-02-17 RX ADMIN — ACETAMINOPHEN 1000 MG: 500 TABLET ORAL at 06:29

## 2024-02-17 RX ADMIN — VASOPRESSIN 0.04 UNITS/MIN: 0.2 INJECTION INTRAVENOUS at 01:55

## 2024-02-17 RX ADMIN — MINERAL SUPPLEMENT IRON 300 MG / 5 ML STRENGTH LIQUID 100 PER BOX UNFLAVORED 300 MG: at 08:08

## 2024-02-17 RX ADMIN — PERFLUTREN 1.5 ML: 6.52 INJECTION, SUSPENSION INTRAVENOUS at 11:00

## 2024-02-17 RX ADMIN — PIPERACILLIN AND TAZOBACTAM 3375 MG: 3; .375 INJECTION, POWDER, LYOPHILIZED, FOR SOLUTION INTRAVENOUS at 16:40

## 2024-02-17 RX ADMIN — FUROSEMIDE 10 MG/HR: 10 INJECTION, SOLUTION INTRAMUSCULAR; INTRAVENOUS at 14:14

## 2024-02-17 RX ADMIN — MIDODRINE HYDROCHLORIDE 5 MG: 5 TABLET ORAL at 08:07

## 2024-02-17 RX ADMIN — SODIUM CHLORIDE, PRESERVATIVE FREE 10 ML: 5 INJECTION INTRAVENOUS at 08:09

## 2024-02-17 RX ADMIN — FUROSEMIDE 30 MG/HR: 10 INJECTION, SOLUTION INTRAMUSCULAR; INTRAVENOUS at 21:58

## 2024-02-17 RX ADMIN — HYDROCORTISONE SODIUM SUCCINATE 50 MG: 100 INJECTION, POWDER, FOR SOLUTION INTRAMUSCULAR; INTRAVENOUS at 06:43

## 2024-02-17 RX ADMIN — ACETAMINOPHEN 1000 MG: 500 TABLET ORAL at 13:36

## 2024-02-17 RX ADMIN — LEVOTHYROXINE SODIUM 200 MCG: 100 TABLET ORAL at 06:29

## 2024-02-17 RX ADMIN — SODIUM BICARBONATE: 84 INJECTION INTRAVENOUS at 10:58

## 2024-02-17 RX ADMIN — FUROSEMIDE 40 MG: 10 INJECTION, SOLUTION INTRAMUSCULAR; INTRAVENOUS at 07:51

## 2024-02-17 RX ADMIN — DOCUSATE SODIUM LIQUID 100 MG: 100 LIQUID ORAL at 20:15

## 2024-02-17 RX ADMIN — Medication 40 MCG/MIN: at 16:33

## 2024-02-17 RX ADMIN — PERFLUTREN 1.5 ML: 6.52 INJECTION, SUSPENSION INTRAVENOUS at 11:12

## 2024-02-17 RX ADMIN — INSULIN LISPRO 4 UNITS: 100 INJECTION, SOLUTION INTRAVENOUS; SUBCUTANEOUS at 17:29

## 2024-02-17 RX ADMIN — SENNOSIDES 8.6 MG: 8.6 TABLET, FILM COATED ORAL at 20:15

## 2024-02-17 RX ADMIN — HEPARIN SODIUM 7500 UNITS: 5000 INJECTION INTRAVENOUS; SUBCUTANEOUS at 13:35

## 2024-02-17 RX ADMIN — PIPERACILLIN AND TAZOBACTAM 3375 MG: 3; .375 INJECTION, POWDER, LYOPHILIZED, FOR SOLUTION INTRAVENOUS at 08:40

## 2024-02-17 RX ADMIN — VASOPRESSIN 0.04 UNITS/MIN: 0.2 INJECTION INTRAVENOUS at 10:12

## 2024-02-17 RX ADMIN — HYDROCORTISONE SODIUM SUCCINATE 50 MG: 100 INJECTION, POWDER, FOR SOLUTION INTRAMUSCULAR; INTRAVENOUS at 10:54

## 2024-02-17 RX ADMIN — FENTANYL CITRATE 50 MCG: 50 INJECTION INTRAMUSCULAR; INTRAVENOUS at 11:03

## 2024-02-17 RX ADMIN — Medication 50 MCG/MIN: at 05:09

## 2024-02-17 RX ADMIN — INSULIN LISPRO 4 UNITS: 100 INJECTION, SOLUTION INTRAVENOUS; SUBCUTANEOUS at 21:30

## 2024-02-17 RX ADMIN — Medication 50 MCG/MIN: at 10:53

## 2024-02-17 RX ADMIN — FENTANYL CITRATE 50 MCG: 50 INJECTION INTRAMUSCULAR; INTRAVENOUS at 04:01

## 2024-02-17 ASSESSMENT — PULMONARY FUNCTION TESTS
PIF_VALUE: 15
PIF_VALUE: 28
PIF_VALUE: 27
PIF_VALUE: 20
PIF_VALUE: 26
PIF_VALUE: 25
PIF_VALUE: 27
PIF_VALUE: 17
PIF_VALUE: 25
PIF_VALUE: 30
PIF_VALUE: 26
PIF_VALUE: 28
PIF_VALUE: 27
PIF_VALUE: 30
PIF_VALUE: 33
PIF_VALUE: 27
PIF_VALUE: 28
PIF_VALUE: 23
PIF_VALUE: 34
PIF_VALUE: 29
PIF_VALUE: 35
PIF_VALUE: 27
PIF_VALUE: 29
PIF_VALUE: 34
PIF_VALUE: 33
PIF_VALUE: 37
PIF_VALUE: 33
PIF_VALUE: 29
PIF_VALUE: 24
PIF_VALUE: 24
PIF_VALUE: 26
PIF_VALUE: 30
PIF_VALUE: 30
PIF_VALUE: 25
PIF_VALUE: 25
PIF_VALUE: 32
PIF_VALUE: 25
PIF_VALUE: 31
PIF_VALUE: 26
PIF_VALUE: 32
PIF_VALUE: 20

## 2024-02-17 ASSESSMENT — PAIN SCALES - GENERAL
PAINLEVEL_OUTOF10: 7
PAINLEVEL_OUTOF10: 8
PAINLEVEL_OUTOF10: 3
PAINLEVEL_OUTOF10: 4
PAINLEVEL_OUTOF10: 8

## 2024-02-17 NOTE — ANESTHESIA POSTPROCEDURE EVALUATION
Department of Anesthesiology  Postprocedure Note    Patient: Ruben Dimas  MRN: [id number]  YOB: 1965  Date of evaluation: 2/17/2024    Procedure Summary       Date: 02/16/24 Room / Location: [address]    Anesthesia Start: 1001 Anesthesia Stop: 1105    Procedure: ABDOMINAL WOUND PREPARATION, APPLICATION SKIN SUBSTITUTE, WOUND VAC PLACEMENT (MISONIX)  *DO NOT MOVE* (Abdomen) Diagnosis:       Open wound of abdomen, initial encounter      (Open wound of abdomen, initial encounter [S31.109A])    Surgeons: Mary Carmen Monte MD Responsible Provider: Queta De La Rosa MD    Anesthesia Type: general ASA Status: 4            Anesthesia Type: No value filed.    Des Phase I: Des Score: 9    Des Phase II:      Anesthesia Post Evaluation    Patient location during evaluation: ICU  Patient participation: complete - patient participated  Level of consciousness: awake and alert  Airway patency: patent  Nausea & Vomiting: no nausea and no vomiting  Cardiovascular status: blood pressure returned to baseline and hemodynamically stable  Respiratory status: acceptable  Hydration status: euvolemic  Comments: No known anesthesia related complication  Kept in ICU- needing fluids and BP support-  Possible sepsis bacterimia  Multimodal analgesia pain management approach  Pain management: adequate      No notable events documented.

## 2024-02-17 NOTE — PROCEDURES
Arterial Line Placement Procedure Note    DATE: 2/9/2024  RE: Ruben Dimas   1965    PREOPERATIVE DIAGNOSIS:  Hypotension    POSTOPERATIVE DIAGNOSIS:  Hypotension    INDICATION:  Need or invasive blood pressure monitoring.     OPERATION PERFORMED:  Placement of a 18 Gauge  Arterial line in right femoral artery    Performed by: Ramon Hightower DO    ASSISTANT(S):      ANESTHESIA:  None    Procedure: Sterile technique was initiated (hand washing, gown, cap, mask, gloves, draping) before the skin over the right femoral artery was prepped with Chloraprep and draped in a sterile fashion. After skin infiltration with 1% plain lidocaine, the vessel was identified with a 20 Ga. introducer needle and a guide wire was placed without difficulty. Then, a 18 Ga. catheter was easily threaded.  Good waveform obtained on monitor and line withdrew and flushed well.  A-line was secured with skin sutures, and dressed.    Complications: None    Ramon Hightower DO  7:07 PM

## 2024-02-17 NOTE — PROCEDURES
Central Line Procedure Note      Patient Name: Ruben Dimas   : 1965  DATE: 2024    Procedure Name: right femoral CVC    Performed by: Ramon Hightower DO    Assistant:  None    Pre Op Diagnosis: hypotension    Post Op Diagnosis: Same as Pre-Op    Anesthesia:  Local lidocaine 1%    EBL: <10 ml    Complications: None    DISCUSSION:  Ruben Dimas is a 58 y.o.-year-old male who requires additional IV access and central pressure monitoring. The history and physical examination were reviewed and confirmed.  The diagnoses, proposed procedure, risks, possible complications, benefits and alternatives were  discussed with the patient or family. Verbal informed consent was obtained.  The patient was then prepared for the procedure.    PROCEDURE:  A timeout was initiated by the bedside nurse and was confirmed by those present.  The patient was placed in a supine position. The skin overlying the Right Femoral Vein was prepped with chlorhexadine and draped in sterile fashion. The skin was infiltrated with local anesthetic. Through the anesthetized region, the introducer needle was inserted into the femoral vein returning dark red non pulsatile blood. A guidewire was placed through the center of the needle with no resistance. A small incision made in the skin with a #11 scalpel blade. The dilator was inserted into the skin and vein over guidewire using Seldinger technique. The dilator was then removed and the catheter was placed in the vein over the guidewire using Seldinger technique. The guidewire was then removed and all ports aspirated and flushed appropriately. The catheter then secured using silk suture and a temporary sterile dressing was applied.  No immediate complication was evident.  All sponge, instrument and needle counts were correct at the completion of the procedure.  The patient tolerated the procedure well with no immediate complication evident.     Ramon Hightower DO  24, 7:05

## 2024-02-17 NOTE — CARE COORDINATION
02/17/24 0924   Readmission Assessment   Number of Days since last admission? 8-30 days  (scheduled OR, from [facility]. [facility] concerned for sepsis and wants pt worked up)   Previous Disposition Other (comment)  ([facility])   Who is being Interviewed Caregiver   What was the patient's/caregiver's perception as to why they think they needed to return back to the hospital? Other (Comment)  ([facility] pt worked up for sepsis)   Did you visit your Primary Care Physician after you left the hospital, before you returned this time? No   Why weren't you able to visit your PCP? Other (Comment)  (at [facility])   Did you see a specialist, such as Cardiac, Pulmonary, Orthopedic Physician, etc. after you left the hospital? Yes   Who advised the patient to return to the hospital? Physician   Does the patient report anything that got in the way of taking their medications? No   In our efforts to provide the best possible care to you and others like you, can you think of anything that we could have done to help you after you left the hospital the first time, so that you might not have needed to return so soon? Other (Comment)  (no)

## 2024-02-17 NOTE — CARE COORDINATION
Case Management Assessment  Initial Evaluation    Date/Time of Evaluation: 2/17/2024 5:19 PM  Assessment Completed by: Mirela Arias RN    If patient is discharged prior to next notation, then this note serves as note for discharge by case management.    Patient Name: Ruben Dimas                   YOB: 1965  Diagnosis: Open wound of abdomen, initial encounter [S31.109A]  Abdominal pain [R10.9]                   Date / Time: 2/16/2024  9:00 AM    Patient Admission Status: Inpatient   Readmission Risk (Low < 19, Mod (19-27), High > 27): Readmission Risk Score: 34.9    Current PCP: Ramy Lazo MD  PCP verified by CM? Yes    Chart Reviewed: Yes      History Provided by: Child/Family  Patient Orientation: Sedated    Patient Cognition: Other (see comment)    Hospitalization in the last 30 days (Readmission):  Yes    If yes, Readmission Assessment in  Navigator will be completed.    Advance Directives:      Code Status: Full Code   Patient's Primary Decision Maker is: Legal Next of Kin    Primary Decision Maker (Active): Richi Dimas Child - [phone]    Primary Decision Maker: Igor Dimas Child - [phone]    Primary Decision Maker: Margaux Dimas    Primary Decision Maker: Tony Dimas    Discharge Planning:    Patient lives with: Other (Comment) Type of Home: Long-Term Acute Care  Primary Care Giver: Other (Comment) ([facility])  Patient Support Systems include: Children   Current Financial resources: Medicare, Medicaid  Current community resources:    Current services prior to admission: Long Term Acute Care            Current DME:              Type of Home Care services:  None    ADLS  Prior functional level: Assistance with the following:  Current functional level: Assistance with the following:    PT AM-PAC:   /24  OT AM-PAC:   /24    Family can provide assistance at DC: No  Would you like Case Management to discuss the discharge plan with any other family members/significant

## 2024-02-17 NOTE — PLAN OF CARE
Problem: Discharge Planning  Goal: Discharge to home or other facility with appropriate resources  Outcome: Progressing     Problem: Pain  Goal: Verbalizes/displays adequate comfort level or baseline comfort level  Outcome: Progressing     Problem: Safety - Adult  Goal: Free from fall injury  Outcome: Progressing     Problem: Chronic Conditions and Co-morbidities  Goal: Patient's chronic conditions and co-morbidity symptoms are monitored and maintained or improved  Outcome: Progressing     Problem: Respiratory - Adult  Goal: Achieves optimal ventilation and oxygenation  2/17/2024 0935 by Karen Walsh RN  Outcome: Progressing  2/17/2024 0808 by Zachariah Crook RCP  Outcome: Progressing

## 2024-02-18 ENCOUNTER — APPOINTMENT (OUTPATIENT)
Dept: GENERAL RADIOLOGY | Age: 59
DRG: 853 | End: 2024-02-18
Attending: SURGERY
Payer: MEDICARE

## 2024-02-18 PROBLEM — I21.4 NSTEMI (NON-ST ELEVATED MYOCARDIAL INFARCTION) (HCC): Status: ACTIVE | Noted: 2024-02-18

## 2024-02-18 LAB
ANION GAP SERPL CALCULATED.3IONS-SCNC: 12 MMOL/L (ref 9–17)
ANION GAP SERPL CALCULATED.3IONS-SCNC: 13 MMOL/L (ref 9–17)
ANION GAP SERPL CALCULATED.3IONS-SCNC: 14 MMOL/L (ref 9–17)
BASOPHILS # BLD: 0 K/UL (ref 0–0.2)
BASOPHILS NFR BLD: 0 % (ref 0–2)
BNP SERPL-MCNC: ABNORMAL PG/ML
BUN SERPL-MCNC: 24 MG/DL (ref 6–20)
BUN SERPL-MCNC: 24 MG/DL (ref 6–20)
BUN SERPL-MCNC: 25 MG/DL (ref 6–20)
CALCIUM SERPL-MCNC: 8.4 MG/DL (ref 8.6–10.4)
CALCIUM SERPL-MCNC: 8.6 MG/DL (ref 8.6–10.4)
CALCIUM SERPL-MCNC: 8.7 MG/DL (ref 8.6–10.4)
CHLORIDE SERPL-SCNC: 100 MMOL/L (ref 98–107)
CHLORIDE SERPL-SCNC: 100 MMOL/L (ref 98–107)
CHLORIDE SERPL-SCNC: 101 MMOL/L (ref 98–107)
CO2 SERPL-SCNC: 18 MMOL/L (ref 20–31)
CO2 SERPL-SCNC: 20 MMOL/L (ref 20–31)
CO2 SERPL-SCNC: 21 MMOL/L (ref 20–31)
CREAT SERPL-MCNC: 3.1 MG/DL (ref 0.7–1.2)
CREAT SERPL-MCNC: 3.2 MG/DL (ref 0.7–1.2)
CREAT SERPL-MCNC: 3.3 MG/DL (ref 0.7–1.2)
EOSINOPHIL # BLD: 0 K/UL (ref 0–0.4)
EOSINOPHILS RELATIVE PERCENT: 0 % (ref 1–4)
ERYTHROCYTE [DISTWIDTH] IN BLOOD BY AUTOMATED COUNT: 19.9 % (ref 11.8–14.4)
FIO2: 40
GFR SERPL CREATININE-BSD FRML MDRD: 21 ML/MIN/1.73M2
GFR SERPL CREATININE-BSD FRML MDRD: 21 ML/MIN/1.73M2
GFR SERPL CREATININE-BSD FRML MDRD: 22 ML/MIN/1.73M2
GLUCOSE BLD-MCNC: 167 MG/DL (ref 75–110)
GLUCOSE BLD-MCNC: 176 MG/DL (ref 75–110)
GLUCOSE BLD-MCNC: 198 MG/DL (ref 74–100)
GLUCOSE BLD-MCNC: 208 MG/DL (ref 75–110)
GLUCOSE BLD-MCNC: 221 MG/DL (ref 75–110)
GLUCOSE BLD-MCNC: 229 MG/DL (ref 75–110)
GLUCOSE SERPL-MCNC: 191 MG/DL (ref 70–99)
GLUCOSE SERPL-MCNC: 234 MG/DL (ref 70–99)
GLUCOSE SERPL-MCNC: 234 MG/DL (ref 70–99)
HCT VFR BLD AUTO: 22.7 % (ref 40.7–50.3)
HGB BLD-MCNC: 7.5 G/DL (ref 13–17)
IMM GRANULOCYTES # BLD AUTO: 0 K/UL (ref 0–0.3)
IMM GRANULOCYTES NFR BLD: 0 %
LACTIC ACID, WHOLE BLOOD: 2.8 MMOL/L (ref 0.7–2.1)
LYMPHOCYTES NFR BLD: 1.15 K/UL (ref 1–4.8)
LYMPHOCYTES RELATIVE PERCENT: 10 % (ref 24–44)
MAGNESIUM SERPL-MCNC: 1.6 MG/DL (ref 1.6–2.6)
MCH RBC QN AUTO: 29.1 PG (ref 25.2–33.5)
MCHC RBC AUTO-ENTMCNC: 33 G/DL (ref 28.4–34.8)
MCV RBC AUTO: 88 FL (ref 82.6–102.9)
MODE: ABNORMAL
MONOCYTES NFR BLD: 0.46 K/UL (ref 0.1–0.8)
MONOCYTES NFR BLD: 4 % (ref 1–7)
MORPHOLOGY: ABNORMAL
NEGATIVE BASE EXCESS, ART: 4.6 MMOL/L (ref 0–2)
NEUTROPHILS NFR BLD: 86 % (ref 36–66)
NEUTS SEG NFR BLD: 9.89 K/UL (ref 1.8–7.7)
NRBC BLD-RTO: 0 PER 100 WBC
O2 DELIVERY DEVICE: ABNORMAL
PHOSPHATE SERPL-MCNC: 3.2 MG/DL (ref 2.5–4.5)
PLATELET # BLD AUTO: 75 K/UL (ref 138–453)
PMV BLD AUTO: 9.5 FL (ref 8.1–13.5)
POC HCO3: 19.9 MMOL/L (ref 21–28)
POC LACTIC ACID: 2 MMOL/L (ref 0.56–1.39)
POC O2 SATURATION: 99.6 % (ref 94–98)
POC PCO2: 33.3 MM HG (ref 35–48)
POC PH: 7.38 (ref 7.35–7.45)
POC PO2: 184.2 MM HG (ref 83–108)
POTASSIUM SERPL-SCNC: 4 MMOL/L (ref 3.7–5.3)
POTASSIUM SERPL-SCNC: 4 MMOL/L (ref 3.7–5.3)
POTASSIUM SERPL-SCNC: 4.1 MMOL/L (ref 3.7–5.3)
RBC # BLD AUTO: 2.58 M/UL (ref 4.21–5.77)
SAMPLE SITE: ABNORMAL
SODIUM SERPL-SCNC: 133 MMOL/L (ref 135–144)
TROPONIN I SERPL HS-MCNC: 218 NG/L (ref 0–22)
TROPONIN I SERPL HS-MCNC: 224 NG/L (ref 0–22)
TROPONIN I SERPL HS-MCNC: 242 NG/L (ref 0–22)
TROPONIN I SERPL HS-MCNC: 258 NG/L (ref 0–22)
WBC OTHER # BLD: 11.5 K/UL (ref 3.5–11.3)

## 2024-02-18 PROCEDURE — 2580000003 HC RX 258: Performed by: STUDENT IN AN ORGANIZED HEALTH CARE EDUCATION/TRAINING PROGRAM

## 2024-02-18 PROCEDURE — 6370000000 HC RX 637 (ALT 250 FOR IP): Performed by: STUDENT IN AN ORGANIZED HEALTH CARE EDUCATION/TRAINING PROGRAM

## 2024-02-18 PROCEDURE — 85025 COMPLETE CBC W/AUTO DIFF WBC: CPT

## 2024-02-18 PROCEDURE — 2000000000 HC ICU R&B

## 2024-02-18 PROCEDURE — 82803 BLOOD GASES ANY COMBINATION: CPT

## 2024-02-18 PROCEDURE — 6360000002 HC RX W HCPCS: Performed by: INTERNAL MEDICINE

## 2024-02-18 PROCEDURE — 99223 1ST HOSP IP/OBS HIGH 75: CPT | Performed by: INTERNAL MEDICINE

## 2024-02-18 PROCEDURE — 6370000000 HC RX 637 (ALT 250 FOR IP): Performed by: NURSE PRACTITIONER

## 2024-02-18 PROCEDURE — 2500000003 HC RX 250 WO HCPCS: Performed by: INTERNAL MEDICINE

## 2024-02-18 PROCEDURE — 02HV33Z INSERTION OF INFUSION DEVICE INTO SUPERIOR VENA CAVA, PERCUTANEOUS APPROACH: ICD-10-PCS | Performed by: SURGERY

## 2024-02-18 PROCEDURE — 94003 VENT MGMT INPAT SUBQ DAY: CPT

## 2024-02-18 PROCEDURE — 2580000003 HC RX 258: Performed by: INTERNAL MEDICINE

## 2024-02-18 PROCEDURE — 84484 ASSAY OF TROPONIN QUANT: CPT

## 2024-02-18 PROCEDURE — 83605 ASSAY OF LACTIC ACID: CPT

## 2024-02-18 PROCEDURE — 6360000002 HC RX W HCPCS

## 2024-02-18 PROCEDURE — 71045 X-RAY EXAM CHEST 1 VIEW: CPT

## 2024-02-18 PROCEDURE — 84100 ASSAY OF PHOSPHORUS: CPT

## 2024-02-18 PROCEDURE — 99233 SBSQ HOSP IP/OBS HIGH 50: CPT | Performed by: INTERNAL MEDICINE

## 2024-02-18 PROCEDURE — 2580000003 HC RX 258

## 2024-02-18 PROCEDURE — 2500000003 HC RX 250 WO HCPCS: Performed by: STUDENT IN AN ORGANIZED HEALTH CARE EDUCATION/TRAINING PROGRAM

## 2024-02-18 PROCEDURE — 83880 ASSAY OF NATRIURETIC PEPTIDE: CPT

## 2024-02-18 PROCEDURE — 37799 UNLISTED PX VASCULAR SURGERY: CPT

## 2024-02-18 PROCEDURE — A4216 STERILE WATER/SALINE, 10 ML: HCPCS

## 2024-02-18 PROCEDURE — 6360000002 HC RX W HCPCS: Performed by: STUDENT IN AN ORGANIZED HEALTH CARE EDUCATION/TRAINING PROGRAM

## 2024-02-18 PROCEDURE — 99232 SBSQ HOSP IP/OBS MODERATE 35: CPT | Performed by: INTERNAL MEDICINE

## 2024-02-18 PROCEDURE — 83735 ASSAY OF MAGNESIUM: CPT

## 2024-02-18 PROCEDURE — 6370000000 HC RX 637 (ALT 250 FOR IP)

## 2024-02-18 PROCEDURE — C9113 INJ PANTOPRAZOLE SODIUM, VIA: HCPCS

## 2024-02-18 PROCEDURE — 94761 N-INVAS EAR/PLS OXIMETRY MLT: CPT

## 2024-02-18 PROCEDURE — 6360000002 HC RX W HCPCS: Performed by: SURGERY

## 2024-02-18 PROCEDURE — NBSRV NON-BILLABLE SERVICE: Performed by: INTERNAL MEDICINE

## 2024-02-18 PROCEDURE — 80048 BASIC METABOLIC PNL TOTAL CA: CPT

## 2024-02-18 PROCEDURE — 2700000000 HC OXYGEN THERAPY PER DAY

## 2024-02-18 PROCEDURE — 99233 SBSQ HOSP IP/OBS HIGH 50: CPT | Performed by: SURGERY

## 2024-02-18 RX ORDER — HEPARIN SODIUM 1000 [USP'U]/ML
1700 INJECTION, SOLUTION INTRAVENOUS; SUBCUTANEOUS PRN
Status: DISCONTINUED | OUTPATIENT
Start: 2024-02-18 | End: 2024-02-20

## 2024-02-18 RX ORDER — HEPARIN SODIUM 1000 [USP'U]/ML
1000 INJECTION, SOLUTION INTRAVENOUS; SUBCUTANEOUS ONCE
Status: DISCONTINUED | OUTPATIENT
Start: 2024-02-18 | End: 2024-02-18

## 2024-02-18 RX ORDER — ALPRAZOLAM 0.25 MG/1
0.25 TABLET ORAL NIGHTLY PRN
Status: DISCONTINUED | OUTPATIENT
Start: 2024-02-18 | End: 2024-02-20

## 2024-02-18 RX ORDER — HEPARIN SODIUM 1000 [USP'U]/ML
1500 INJECTION, SOLUTION INTRAVENOUS; SUBCUTANEOUS PRN
Status: DISCONTINUED | OUTPATIENT
Start: 2024-02-18 | End: 2024-02-20

## 2024-02-18 RX ADMIN — Medication 5000 UNITS: at 08:36

## 2024-02-18 RX ADMIN — FUROSEMIDE 40 MG/HR: 10 INJECTION, SOLUTION INTRAMUSCULAR; INTRAVENOUS at 06:49

## 2024-02-18 RX ADMIN — DOCUSATE SODIUM LIQUID 100 MG: 100 LIQUID ORAL at 08:36

## 2024-02-18 RX ADMIN — SODIUM CHLORIDE: 9 INJECTION, SOLUTION INTRAVENOUS at 14:45

## 2024-02-18 RX ADMIN — FUROSEMIDE 30 MG/HR: 10 INJECTION, SOLUTION INTRAMUSCULAR; INTRAVENOUS at 01:27

## 2024-02-18 RX ADMIN — ACETAMINOPHEN 1000 MG: 500 TABLET ORAL at 14:09

## 2024-02-18 RX ADMIN — FENTANYL CITRATE 50 MCG: 50 INJECTION INTRAMUSCULAR; INTRAVENOUS at 12:42

## 2024-02-18 RX ADMIN — HYDROCORTISONE SODIUM SUCCINATE 50 MG: 100 INJECTION, POWDER, FOR SOLUTION INTRAMUSCULAR; INTRAVENOUS at 18:38

## 2024-02-18 RX ADMIN — VASOPRESSIN 0.04 UNITS/MIN: 0.2 INJECTION INTRAVENOUS at 04:14

## 2024-02-18 RX ADMIN — ACETAMINOPHEN 1000 MG: 500 TABLET ORAL at 06:02

## 2024-02-18 RX ADMIN — FOLIC ACID 1 MG: 1 TABLET ORAL at 08:37

## 2024-02-18 RX ADMIN — MIDODRINE HYDROCHLORIDE 10 MG: 5 TABLET ORAL at 19:57

## 2024-02-18 RX ADMIN — SODIUM BICARBONATE: 84 INJECTION INTRAVENOUS at 13:22

## 2024-02-18 RX ADMIN — HEPARIN SODIUM 7500 UNITS: 5000 INJECTION INTRAVENOUS; SUBCUTANEOUS at 06:02

## 2024-02-18 RX ADMIN — MINERAL SUPPLEMENT IRON 300 MG / 5 ML STRENGTH LIQUID 100 PER BOX UNFLAVORED 300 MG: at 08:37

## 2024-02-18 RX ADMIN — AMIODARONE HYDROCHLORIDE 200 MG: 200 TABLET ORAL at 08:37

## 2024-02-18 RX ADMIN — SODIUM BICARBONATE: 84 INJECTION INTRAVENOUS at 05:31

## 2024-02-18 RX ADMIN — HEPARIN SODIUM 1700 UNITS: 1000 INJECTION INTRAVENOUS; SUBCUTANEOUS at 14:48

## 2024-02-18 RX ADMIN — ASPIRIN 81 MG: 81 TABLET, COATED ORAL at 08:37

## 2024-02-18 RX ADMIN — CEFTAZIDIME, AVIBACTAM 1.25 G: 2; .5 POWDER, FOR SOLUTION INTRAVENOUS at 20:40

## 2024-02-18 RX ADMIN — INSULIN LISPRO 4 UNITS: 100 INJECTION, SOLUTION INTRAVENOUS; SUBCUTANEOUS at 06:37

## 2024-02-18 RX ADMIN — HYDROCORTISONE SODIUM SUCCINATE 50 MG: 100 INJECTION, POWDER, FOR SOLUTION INTRAMUSCULAR; INTRAVENOUS at 11:39

## 2024-02-18 RX ADMIN — FUROSEMIDE 40 MG/HR: 10 INJECTION, SOLUTION INTRAMUSCULAR; INTRAVENOUS at 04:14

## 2024-02-18 RX ADMIN — Medication 18 MCG/MIN: at 20:51

## 2024-02-18 RX ADMIN — Medication 20 MCG/MIN: at 18:11

## 2024-02-18 RX ADMIN — PANTOPRAZOLE SODIUM 40 MG: 40 TABLET, DELAYED RELEASE ORAL at 08:37

## 2024-02-18 RX ADMIN — HYDROCORTISONE SODIUM SUCCINATE 50 MG: 100 INJECTION, POWDER, FOR SOLUTION INTRAMUSCULAR; INTRAVENOUS at 06:19

## 2024-02-18 RX ADMIN — FENTANYL CITRATE 50 MCG: 50 INJECTION INTRAMUSCULAR; INTRAVENOUS at 13:07

## 2024-02-18 RX ADMIN — FUROSEMIDE 40 MG/HR: 10 INJECTION, SOLUTION INTRAMUSCULAR; INTRAVENOUS at 10:54

## 2024-02-18 RX ADMIN — HEPARIN SODIUM 1500 UNITS: 1000 INJECTION INTRAVENOUS; SUBCUTANEOUS at 14:46

## 2024-02-18 RX ADMIN — FENTANYL CITRATE 50 MCG: 50 INJECTION INTRAMUSCULAR; INTRAVENOUS at 22:55

## 2024-02-18 RX ADMIN — SODIUM CHLORIDE, PRESERVATIVE FREE 10 ML: 5 INJECTION INTRAVENOUS at 08:37

## 2024-02-18 RX ADMIN — INSULIN LISPRO 4 UNITS: 100 INJECTION, SOLUTION INTRAVENOUS; SUBCUTANEOUS at 11:02

## 2024-02-18 RX ADMIN — Medication 15 MCG/MIN: at 02:03

## 2024-02-18 RX ADMIN — HEPARIN SODIUM 7500 UNITS: 5000 INJECTION INTRAVENOUS; SUBCUTANEOUS at 14:09

## 2024-02-18 RX ADMIN — SODIUM CHLORIDE, PRESERVATIVE FREE 10 ML: 5 INJECTION INTRAVENOUS at 20:10

## 2024-02-18 RX ADMIN — VASOPRESSIN 0.04 UNITS/MIN: 0.2 INJECTION INTRAVENOUS at 21:00

## 2024-02-18 RX ADMIN — MIDODRINE HYDROCHLORIDE 10 MG: 5 TABLET ORAL at 14:09

## 2024-02-18 RX ADMIN — SODIUM CHLORIDE: 9 INJECTION, SOLUTION INTRAVENOUS at 14:24

## 2024-02-18 RX ADMIN — MIDODRINE HYDROCHLORIDE 10 MG: 5 TABLET ORAL at 08:37

## 2024-02-18 RX ADMIN — PANTOPRAZOLE SODIUM 40 MG: 40 INJECTION, POWDER, FOR SOLUTION INTRAVENOUS at 11:39

## 2024-02-18 RX ADMIN — CITALOPRAM HYDROBROMIDE 30 MG: 10 TABLET ORAL at 08:36

## 2024-02-18 RX ADMIN — FUROSEMIDE 40 MG/HR: 10 INJECTION, SOLUTION INTRAMUSCULAR; INTRAVENOUS at 09:39

## 2024-02-18 RX ADMIN — ACETAMINOPHEN 1000 MG: 500 TABLET ORAL at 20:00

## 2024-02-18 RX ADMIN — FENTANYL CITRATE 50 MCG: 50 INJECTION INTRAMUSCULAR; INTRAVENOUS at 16:00

## 2024-02-18 RX ADMIN — DOCUSATE SODIUM LIQUID 100 MG: 100 LIQUID ORAL at 19:57

## 2024-02-18 RX ADMIN — ALPRAZOLAM 0.25 MG: 0.25 TABLET ORAL at 19:59

## 2024-02-18 RX ADMIN — SENNOSIDES 8.6 MG: 8.6 TABLET, FILM COATED ORAL at 19:57

## 2024-02-18 RX ADMIN — HEPARIN SODIUM 7500 UNITS: 5000 INJECTION INTRAVENOUS; SUBCUTANEOUS at 22:30

## 2024-02-18 RX ADMIN — CEFTAZIDIME, AVIBACTAM 1.25 G: 2; .5 POWDER, FOR SOLUTION INTRAVENOUS at 05:32

## 2024-02-18 RX ADMIN — CEFTAZIDIME, AVIBACTAM 1.25 G: 2; .5 POWDER, FOR SOLUTION INTRAVENOUS at 13:24

## 2024-02-18 RX ADMIN — VASOPRESSIN 0.04 UNITS/MIN: 0.2 INJECTION INTRAVENOUS at 11:53

## 2024-02-18 RX ADMIN — HYDROCORTISONE SODIUM SUCCINATE 50 MG: 100 INJECTION, POWDER, FOR SOLUTION INTRAMUSCULAR; INTRAVENOUS at 01:28

## 2024-02-18 RX ADMIN — OXYCODONE 5 MG: 5 TABLET ORAL at 14:09

## 2024-02-18 RX ADMIN — LEVOTHYROXINE SODIUM 200 MCG: 100 TABLET ORAL at 06:02

## 2024-02-18 RX ADMIN — SENNOSIDES 8.6 MG: 8.6 TABLET, FILM COATED ORAL at 08:37

## 2024-02-18 RX ADMIN — FENTANYL CITRATE 50 MCG: 50 INJECTION INTRAMUSCULAR; INTRAVENOUS at 02:53

## 2024-02-18 RX ADMIN — OXYCODONE 5 MG: 5 TABLET ORAL at 19:57

## 2024-02-18 ASSESSMENT — PULMONARY FUNCTION TESTS
PIF_VALUE: 25
PIF_VALUE: 24
PIF_VALUE: 27
PIF_VALUE: 26
PIF_VALUE: 28
PIF_VALUE: 26
PIF_VALUE: 24
PIF_VALUE: 26
PIF_VALUE: 25
PIF_VALUE: 25
PIF_VALUE: 24
PIF_VALUE: 29
PIF_VALUE: 26
PIF_VALUE: 23
PIF_VALUE: 24
PIF_VALUE: 9
PIF_VALUE: 25
PIF_VALUE: 27
PIF_VALUE: 25
PIF_VALUE: 28
PIF_VALUE: 26
PIF_VALUE: 27
PIF_VALUE: 22
PIF_VALUE: 23
PIF_VALUE: 25
PIF_VALUE: 29
PIF_VALUE: 24
PIF_VALUE: 24
PIF_VALUE: 26
PIF_VALUE: 25
PIF_VALUE: 25
PIF_VALUE: 27
PIF_VALUE: 25
PIF_VALUE: 25
PIF_VALUE: 26
PIF_VALUE: 24
PIF_VALUE: 30
PIF_VALUE: 32
PIF_VALUE: 25
PIF_VALUE: 24
PIF_VALUE: 28
PIF_VALUE: 20
PIF_VALUE: 25
PIF_VALUE: 24
PIF_VALUE: 24
PIF_VALUE: 26
PIF_VALUE: 24
PIF_VALUE: 21
PIF_VALUE: 23
PIF_VALUE: 25
PIF_VALUE: 25
PIF_VALUE: 24
PIF_VALUE: 24
PIF_VALUE: 25
PIF_VALUE: 25
PIF_VALUE: 24
PIF_VALUE: 28
PIF_VALUE: 25
PIF_VALUE: 26
PIF_VALUE: 25
PIF_VALUE: 27
PIF_VALUE: 27
PIF_VALUE: 24
PIF_VALUE: 25
PIF_VALUE: 26
PIF_VALUE: 25
PIF_VALUE: 25
PIF_VALUE: 24
PIF_VALUE: 26
PIF_VALUE: 24
PIF_VALUE: 24
PIF_VALUE: 26
PIF_VALUE: 26
PIF_VALUE: 22
PIF_VALUE: 24
PIF_VALUE: 25
PIF_VALUE: 26
PIF_VALUE: 25
PIF_VALUE: 28
PIF_VALUE: 26

## 2024-02-18 ASSESSMENT — PAIN SCALES - GENERAL
PAINLEVEL_OUTOF10: 7
PAINLEVEL_OUTOF10: 4
PAINLEVEL_OUTOF10: 7

## 2024-02-18 NOTE — PLAN OF CARE
Problem: Respiratory - Adult  Goal: Achieves optimal ventilation and oxygenation  2/18/2024 0824 by Zachariah Crook, AGNIESZKA  Outcome: Progressing

## 2024-02-18 NOTE — PLAN OF CARE
Problem: Discharge Planning  Goal: Discharge to home or other facility with appropriate resources  2/18/2024 0801 by Karen Walsh RN  Outcome: Progressing  2/17/2024 2159 by Augusto Bush RN  Outcome: Progressing     Problem: Pain  Goal: Verbalizes/displays adequate comfort level or baseline comfort level  2/18/2024 0801 by Karen Walsh RN  Outcome: Progressing  2/17/2024 2159 by Augusto Bush RN  Outcome: Progressing     Problem: Safety - Adult  Goal: Free from fall injury  2/18/2024 0801 by Karen Walsh RN  Outcome: Progressing  2/17/2024 2159 by Augusto Bush RN  Outcome: Progressing     Problem: Chronic Conditions and Co-morbidities  Goal: Patient's chronic conditions and co-morbidity symptoms are monitored and maintained or improved  2/18/2024 0801 by Karen Walsh RN  Outcome: Progressing  2/17/2024 2159 by Augusto Bush RN  Outcome: Progressing     Problem: Respiratory - Adult  Goal: Achieves optimal ventilation and oxygenation  2/18/2024 0801 by Karen Walsh RN  Outcome: Progressing  2/17/2024 2159 by Augusto Bush RN  Outcome: Progressing     Problem: Nutrition Deficit:  Goal: Optimize nutritional status  2/18/2024 0801 by Karen Walsh RN  Outcome: Progressing  2/17/2024 2159 by Augusto Bush RN  Outcome: Progressing

## 2024-02-18 NOTE — PLAN OF CARE
Problem: Discharge Planning  Goal: Discharge to home or other facility with appropriate resources  2/17/2024 2159 by Augusto Bush RN  Outcome: Progressing  2/17/2024 0935 by Karen Walsh RN  Outcome: Progressing     Problem: Pain  Goal: Verbalizes/displays adequate comfort level or baseline comfort level  2/17/2024 2159 by Augusto Bush RN  Outcome: Progressing  2/17/2024 0935 by Karen Walsh RN  Outcome: Progressing     Problem: Safety - Adult  Goal: Free from fall injury  2/17/2024 2159 by Augusto Bush RN  Outcome: Progressing  2/17/2024 0935 by Karen Walsh RN  Outcome: Progressing     Problem: Chronic Conditions and Co-morbidities  Goal: Patient's chronic conditions and co-morbidity symptoms are monitored and maintained or improved  2/17/2024 2159 by Augusto Bush RN  Outcome: Progressing  2/17/2024 0935 by Karen Walsh RN  Outcome: Progressing     Problem: Respiratory - Adult  Goal: Achieves optimal ventilation and oxygenation  2/17/2024 2159 by Augusto Bush RN  Outcome: Progressing  2/17/2024 0935 by Karen Walsh RN  Outcome: Progressing  2/17/2024 0808 by Zachariah Crook RCP  Outcome: Progressing     Problem: Nutrition Deficit:  Goal: Optimize nutritional status  Outcome: Progressing

## 2024-02-18 NOTE — PROCEDURES
Central Line Placement Procedure Note    Performed by: Armando Barrett MD    Indication: Hemodialysis     Consent: The legal guardian was counseled regarding the procedure, its indications, risks, potential complications and alternatives, and any questions were answered. Consent was obtained to proceed.    Time out performed: Immediately prior to the procedure a \"time out\" was called to verify the correct patient, the correct procedure, equipment, support staff and site/side marked as required.      All elements of maximal sterile barrier techniques were followed.    Procedure: The patient was positioned appropriately and the skin over the left internal jugular vein was prepped with chlorhexidine. Local anesthesia was obtained by infiltration using 3.0 cc of 1% Lidocaine without epinephrine.  A large bore needle was used to identify the vein.  A guide wire was then inserted into the vein through the needle. Triple lumen hemodialysis catheter was then inserted into the vessel over the guide wire using the Seldinger technique.  All ports showed good, free flowing blood return and were flushed with saline solution.  The catheter was then securely fastened to the skin with sutures and covered with a sterile dressing.  A post procedure X-ray was ordered and is still pending at this time.    The patient tolerated the procedure well.    Complications: None

## 2024-02-19 ENCOUNTER — APPOINTMENT (OUTPATIENT)
Dept: CT IMAGING | Age: 59
DRG: 853 | End: 2024-02-19
Attending: SURGERY
Payer: MEDICARE

## 2024-02-19 ENCOUNTER — APPOINTMENT (OUTPATIENT)
Dept: GENERAL RADIOLOGY | Age: 59
DRG: 853 | End: 2024-02-19
Attending: SURGERY
Payer: MEDICARE

## 2024-02-19 PROBLEM — R57.9 SHOCK (HCC): Status: ACTIVE | Noted: 2024-01-01

## 2024-02-19 PROBLEM — A41.9 SEPTIC SHOCK (HCC): Status: ACTIVE | Noted: 2024-02-19

## 2024-02-19 PROBLEM — I35.0 NONRHEUMATIC AORTIC VALVE STENOSIS: Status: ACTIVE | Noted: 2024-02-19

## 2024-02-19 PROBLEM — R65.21 SEPTIC SHOCK (HCC): Status: ACTIVE | Noted: 2024-02-19

## 2024-02-19 PROBLEM — R60.0 BILATERAL LOWER EXTREMITY EDEMA: Status: ACTIVE | Noted: 2023-04-13

## 2024-02-19 PROBLEM — E87.70 HYPERVOLEMIA: Status: ACTIVE | Noted: 2024-02-19

## 2024-02-19 LAB
ALBUMIN SERPL-MCNC: 2.3 G/DL (ref 3.5–5.2)
ALBUMIN/GLOB SERPL: 0.9 {RATIO} (ref 1–2.5)
ALLEN TEST: ABNORMAL
ALP SERPL-CCNC: 937 U/L (ref 40–129)
ALT SERPL-CCNC: 29 U/L (ref 5–41)
AMMONIA PLAS-SCNC: 155 UMOL/L (ref 16–60)
ANION GAP SERPL CALCULATED.3IONS-SCNC: 12 MMOL/L (ref 9–17)
ANION GAP SERPL CALCULATED.3IONS-SCNC: 14 MMOL/L (ref 9–17)
ANION GAP SERPL CALCULATED.3IONS-SCNC: 17 MMOL/L (ref 9–17)
ANION GAP SERPL CALCULATED.3IONS-SCNC: 18 MMOL/L (ref 9–17)
AST SERPL-CCNC: 34 U/L
BASOPHILS # BLD: 0 K/UL (ref 0–0.2)
BASOPHILS NFR BLD: 0 % (ref 0–2)
BILIRUB DIRECT SERPL-MCNC: 2.5 MG/DL
BILIRUB INDIRECT SERPL-MCNC: 0.7 MG/DL (ref 0–1)
BILIRUB SERPL-MCNC: 3.2 MG/DL (ref 0.3–1.2)
BNP SERPL-MCNC: ABNORMAL PG/ML
BUN SERPL-MCNC: 19 MG/DL (ref 6–20)
BUN SERPL-MCNC: 21 MG/DL (ref 6–20)
BUN SERPL-MCNC: 24 MG/DL (ref 6–20)
BUN SERPL-MCNC: 25 MG/DL (ref 6–20)
CA-I BLD-SCNC: 1.13 MMOL/L (ref 1.13–1.33)
CA-I BLD-SCNC: 1.16 MMOL/L (ref 1.13–1.33)
CALCIUM SERPL-MCNC: 8.1 MG/DL (ref 8.6–10.4)
CALCIUM SERPL-MCNC: 8.3 MG/DL (ref 8.6–10.4)
CALCIUM SERPL-MCNC: 8.4 MG/DL (ref 8.6–10.4)
CALCIUM SERPL-MCNC: 8.6 MG/DL (ref 8.6–10.4)
CHLORIDE SERPL-SCNC: 100 MMOL/L (ref 98–107)
CHLORIDE SERPL-SCNC: 101 MMOL/L (ref 98–107)
CHLORIDE SERPL-SCNC: 101 MMOL/L (ref 98–107)
CHLORIDE SERPL-SCNC: 102 MMOL/L (ref 98–107)
CO2 SERPL-SCNC: 15 MMOL/L (ref 20–31)
CO2 SERPL-SCNC: 18 MMOL/L (ref 20–31)
CO2 SERPL-SCNC: 19 MMOL/L (ref 20–31)
CO2 SERPL-SCNC: 20 MMOL/L (ref 20–31)
CREAT SERPL-MCNC: 2.5 MG/DL (ref 0.7–1.2)
CREAT SERPL-MCNC: 2.8 MG/DL (ref 0.7–1.2)
CREAT SERPL-MCNC: 3.1 MG/DL (ref 0.7–1.2)
CREAT SERPL-MCNC: 3.3 MG/DL (ref 0.7–1.2)
EKG ATRIAL RATE: 97 BPM
EKG P AXIS: 29 DEGREES
EKG P-R INTERVAL: 194 MS
EKG Q-T INTERVAL: 412 MS
EKG QRS DURATION: 190 MS
EKG QTC CALCULATION (BAZETT): 523 MS
EKG R AXIS: -51 DEGREES
EKG T AXIS: 114 DEGREES
EKG VENTRICULAR RATE: 97 BPM
EOSINOPHIL # BLD: 0 K/UL (ref 0–0.44)
EOSINOPHILS RELATIVE PERCENT: 0 % (ref 1–4)
ERYTHROCYTE [DISTWIDTH] IN BLOOD BY AUTOMATED COUNT: 20 % (ref 11.8–14.4)
FIO2: 40
FIO2: 40
GFR SERPL CREATININE-BSD FRML MDRD: 21 ML/MIN/1.73M2
GFR SERPL CREATININE-BSD FRML MDRD: 22 ML/MIN/1.73M2
GFR SERPL CREATININE-BSD FRML MDRD: 25 ML/MIN/1.73M2
GFR SERPL CREATININE-BSD FRML MDRD: 29 ML/MIN/1.73M2
GLUCOSE BLD-MCNC: 139 MG/DL (ref 74–100)
GLUCOSE BLD-MCNC: 149 MG/DL (ref 75–110)
GLUCOSE BLD-MCNC: 158 MG/DL (ref 75–110)
GLUCOSE BLD-MCNC: 159 MG/DL (ref 75–110)
GLUCOSE SERPL-MCNC: 158 MG/DL (ref 70–99)
GLUCOSE SERPL-MCNC: 166 MG/DL (ref 70–99)
GLUCOSE SERPL-MCNC: 167 MG/DL (ref 70–99)
GLUCOSE SERPL-MCNC: 173 MG/DL (ref 70–99)
HCT VFR BLD AUTO: 22.7 % (ref 40.7–50.3)
HGB BLD-MCNC: 7.7 G/DL (ref 13–17)
IMM GRANULOCYTES # BLD AUTO: 0.27 K/UL (ref 0–0.3)
IMM GRANULOCYTES NFR BLD: 2 %
LACTIC ACID, WHOLE BLOOD: 3.2 MMOL/L (ref 0.7–2.1)
LYMPHOCYTES NFR BLD: 0.55 K/UL (ref 1.1–3.7)
LYMPHOCYTES RELATIVE PERCENT: 4 % (ref 24–43)
MAGNESIUM SERPL-MCNC: 1.5 MG/DL (ref 1.6–2.6)
MAGNESIUM SERPL-MCNC: 2 MG/DL (ref 1.6–2.6)
MAGNESIUM SERPL-MCNC: 2.2 MG/DL (ref 1.6–2.6)
MCH RBC QN AUTO: 29.5 PG (ref 25.2–33.5)
MCHC RBC AUTO-ENTMCNC: 33.9 G/DL (ref 28.4–34.8)
MCV RBC AUTO: 87 FL (ref 82.6–102.9)
MICROORGANISM SPEC CULT: ABNORMAL
MICROORGANISM SPEC CULT: ABNORMAL
MODE: ABNORMAL
MODE: ABNORMAL
MONOCYTES NFR BLD: 0.41 K/UL (ref 0.1–1.2)
MONOCYTES NFR BLD: 3 % (ref 3–12)
MORPHOLOGY: ABNORMAL
NEGATIVE BASE EXCESS, ART: 4.3 MMOL/L (ref 0–2)
NEGATIVE BASE EXCESS, ART: 8.8 MMOL/L (ref 0–2)
NEUTROPHILS NFR BLD: 91 % (ref 36–65)
NEUTS SEG NFR BLD: 12.47 K/UL (ref 1.5–8.1)
NRBC BLD-RTO: 0.2 PER 100 WBC
O2 DELIVERY DEVICE: ABNORMAL
O2 DELIVERY DEVICE: ABNORMAL
PATIENT TEMP: 36.4
PHOSPHATE SERPL-MCNC: 3 MG/DL (ref 2.5–4.5)
PHOSPHATE SERPL-MCNC: 3.3 MG/DL (ref 2.5–4.5)
PHOSPHATE SERPL-MCNC: 3.7 MG/DL (ref 2.5–4.5)
PLATELET # BLD AUTO: 77 K/UL (ref 138–453)
PMV BLD AUTO: 10.4 FL (ref 8.1–13.5)
POC HCO3: 16.6 MMOL/L (ref 21–28)
POC HCO3: 20 MMOL/L (ref 21–28)
POC LACTIC ACID: 2.7 MMOL/L (ref 0.56–1.39)
POC LACTIC ACID: 6.7 MMOL/L (ref 0.56–1.39)
POC O2 SATURATION: 99.3 % (ref 94–98)
POC O2 SATURATION: 99.5 % (ref 94–98)
POC PCO2: 32.7 MM HG (ref 35–48)
POC PCO2: 33.3 MM HG (ref 35–48)
POC PH: 7.31 (ref 7.35–7.45)
POC PH: 7.39 (ref 7.35–7.45)
POC PO2: 157.9 MM HG (ref 83–108)
POC PO2: 166.9 MM HG (ref 83–108)
POTASSIUM SERPL-SCNC: 3.8 MMOL/L (ref 3.7–5.3)
POTASSIUM SERPL-SCNC: 3.8 MMOL/L (ref 3.7–5.3)
POTASSIUM SERPL-SCNC: 4 MMOL/L (ref 3.7–5.3)
POTASSIUM SERPL-SCNC: 4.1 MMOL/L (ref 3.7–5.3)
PROT SERPL-MCNC: 5 G/DL (ref 6.4–8.3)
RBC # BLD AUTO: 2.61 M/UL (ref 4.21–5.77)
SAMPLE SITE: ABNORMAL
SAMPLE SITE: ABNORMAL
SODIUM SERPL-SCNC: 133 MMOL/L (ref 135–144)
SODIUM SERPL-SCNC: 133 MMOL/L (ref 135–144)
SODIUM SERPL-SCNC: 134 MMOL/L (ref 135–144)
SODIUM SERPL-SCNC: 137 MMOL/L (ref 135–144)
SPECIMEN DESCRIPTION: ABNORMAL
TROPONIN I SERPL HS-MCNC: 188 NG/L (ref 0–22)
TROPONIN I SERPL HS-MCNC: 190 NG/L (ref 0–22)
WBC OTHER # BLD: 13.7 K/UL (ref 3.5–11.3)

## 2024-02-19 PROCEDURE — 5A1D70Z PERFORMANCE OF URINARY FILTRATION, INTERMITTENT, LESS THAN 6 HOURS PER DAY: ICD-10-PCS | Performed by: SURGERY

## 2024-02-19 PROCEDURE — 93010 ELECTROCARDIOGRAM REPORT: CPT | Performed by: INTERNAL MEDICINE

## 2024-02-19 PROCEDURE — 90945 DIALYSIS ONE EVALUATION: CPT

## 2024-02-19 PROCEDURE — 80048 BASIC METABOLIC PNL TOTAL CA: CPT

## 2024-02-19 PROCEDURE — 2000000000 HC ICU R&B

## 2024-02-19 PROCEDURE — 82140 ASSAY OF AMMONIA: CPT

## 2024-02-19 PROCEDURE — 6360000002 HC RX W HCPCS

## 2024-02-19 PROCEDURE — 37799 UNLISTED PX VASCULAR SURGERY: CPT

## 2024-02-19 PROCEDURE — 85025 COMPLETE CBC W/AUTO DIFF WBC: CPT

## 2024-02-19 PROCEDURE — 6360000002 HC RX W HCPCS: Performed by: INTERNAL MEDICINE

## 2024-02-19 PROCEDURE — 80076 HEPATIC FUNCTION PANEL: CPT

## 2024-02-19 PROCEDURE — 2580000003 HC RX 258: Performed by: INTERNAL MEDICINE

## 2024-02-19 PROCEDURE — 70450 CT HEAD/BRAIN W/O DYE: CPT

## 2024-02-19 PROCEDURE — 99233 SBSQ HOSP IP/OBS HIGH 50: CPT | Performed by: INTERNAL MEDICINE

## 2024-02-19 PROCEDURE — 6370000000 HC RX 637 (ALT 250 FOR IP)

## 2024-02-19 PROCEDURE — 36430 TRANSFUSION BLD/BLD COMPNT: CPT

## 2024-02-19 PROCEDURE — 93005 ELECTROCARDIOGRAM TRACING: CPT

## 2024-02-19 PROCEDURE — 94761 N-INVAS EAR/PLS OXIMETRY MLT: CPT

## 2024-02-19 PROCEDURE — 6370000000 HC RX 637 (ALT 250 FOR IP): Performed by: STUDENT IN AN ORGANIZED HEALTH CARE EDUCATION/TRAINING PROGRAM

## 2024-02-19 PROCEDURE — 83735 ASSAY OF MAGNESIUM: CPT

## 2024-02-19 PROCEDURE — 71250 CT THORAX DX C-: CPT

## 2024-02-19 PROCEDURE — A4216 STERILE WATER/SALINE, 10 ML: HCPCS

## 2024-02-19 PROCEDURE — 82330 ASSAY OF CALCIUM: CPT

## 2024-02-19 PROCEDURE — 84100 ASSAY OF PHOSPHORUS: CPT

## 2024-02-19 PROCEDURE — 83880 ASSAY OF NATRIURETIC PEPTIDE: CPT

## 2024-02-19 PROCEDURE — P9016 RBC LEUKOCYTES REDUCED: HCPCS

## 2024-02-19 PROCEDURE — 71045 X-RAY EXAM CHEST 1 VIEW: CPT

## 2024-02-19 PROCEDURE — 2580000003 HC RX 258

## 2024-02-19 PROCEDURE — 2700000000 HC OXYGEN THERAPY PER DAY

## 2024-02-19 PROCEDURE — 6360000002 HC RX W HCPCS: Performed by: SURGERY

## 2024-02-19 PROCEDURE — 2580000003 HC RX 258: Performed by: STUDENT IN AN ORGANIZED HEALTH CARE EDUCATION/TRAINING PROGRAM

## 2024-02-19 PROCEDURE — 6370000000 HC RX 637 (ALT 250 FOR IP): Performed by: NURSE PRACTITIONER

## 2024-02-19 PROCEDURE — 90945 DIALYSIS ONE EVALUATION: CPT | Performed by: INTERNAL MEDICINE

## 2024-02-19 PROCEDURE — P9041 ALBUMIN (HUMAN),5%, 50ML: HCPCS

## 2024-02-19 PROCEDURE — 6360000002 HC RX W HCPCS: Performed by: STUDENT IN AN ORGANIZED HEALTH CARE EDUCATION/TRAINING PROGRAM

## 2024-02-19 PROCEDURE — 82803 BLOOD GASES ANY COMBINATION: CPT

## 2024-02-19 PROCEDURE — 83605 ASSAY OF LACTIC ACID: CPT

## 2024-02-19 PROCEDURE — 2500000003 HC RX 250 WO HCPCS: Performed by: STUDENT IN AN ORGANIZED HEALTH CARE EDUCATION/TRAINING PROGRAM

## 2024-02-19 PROCEDURE — 94003 VENT MGMT INPAT SUBQ DAY: CPT

## 2024-02-19 PROCEDURE — C9113 INJ PANTOPRAZOLE SODIUM, VIA: HCPCS

## 2024-02-19 PROCEDURE — 82947 ASSAY GLUCOSE BLOOD QUANT: CPT

## 2024-02-19 PROCEDURE — 84484 ASSAY OF TROPONIN QUANT: CPT

## 2024-02-19 RX ORDER — POTASSIUM CHLORIDE 29.8 MG/ML
20 INJECTION INTRAVENOUS PRN
Status: DISCONTINUED | OUTPATIENT
Start: 2024-02-19 | End: 2024-02-20

## 2024-02-19 RX ORDER — FLUDROCORTISONE ACETATE 0.1 MG/1
0.05 TABLET ORAL DAILY
Status: DISCONTINUED | OUTPATIENT
Start: 2024-02-19 | End: 2024-02-20

## 2024-02-19 RX ORDER — MAGNESIUM SULFATE IN WATER 40 MG/ML
2000 INJECTION, SOLUTION INTRAVENOUS
Status: COMPLETED | OUTPATIENT
Start: 2024-02-19 | End: 2024-02-19

## 2024-02-19 RX ORDER — INSULIN LISPRO 100 [IU]/ML
0-16 INJECTION, SOLUTION INTRAVENOUS; SUBCUTANEOUS EVERY 4 HOURS
Status: DISCONTINUED | OUTPATIENT
Start: 2024-02-19 | End: 2024-02-20

## 2024-02-19 RX ORDER — SODIUM CHLORIDE 9 MG/ML
INJECTION, SOLUTION INTRAVENOUS PRN
Status: DISCONTINUED | OUTPATIENT
Start: 2024-02-19 | End: 2024-02-20

## 2024-02-19 RX ORDER — SODIUM CHLORIDE 9 MG/ML
INJECTION, SOLUTION INTRAVENOUS CONTINUOUS
Status: DISCONTINUED | OUTPATIENT
Start: 2024-02-19 | End: 2024-02-20

## 2024-02-19 RX ORDER — MAGNESIUM SULFATE 1 G/100ML
1000 INJECTION INTRAVENOUS PRN
Status: DISCONTINUED | OUTPATIENT
Start: 2024-02-19 | End: 2024-02-20

## 2024-02-19 RX ORDER — CALCIUM GLUCONATE 20 MG/ML
2000 INJECTION, SOLUTION INTRAVENOUS PRN
Status: DISCONTINUED | OUTPATIENT
Start: 2024-02-19 | End: 2024-02-20

## 2024-02-19 RX ORDER — ALBUMIN, HUMAN INJ 5% 5 %
25 SOLUTION INTRAVENOUS ONCE
Status: COMPLETED | OUTPATIENT
Start: 2024-02-19 | End: 2024-02-19

## 2024-02-19 RX ORDER — CALCIUM GLUCONATE 20 MG/ML
1000 INJECTION, SOLUTION INTRAVENOUS ONCE
Status: COMPLETED | OUTPATIENT
Start: 2024-02-19 | End: 2024-02-20

## 2024-02-19 RX ORDER — CALCIUM GLUCONATE 20 MG/ML
1000 INJECTION, SOLUTION INTRAVENOUS PRN
Status: DISCONTINUED | OUTPATIENT
Start: 2024-02-19 | End: 2024-02-20

## 2024-02-19 RX ADMIN — ASPIRIN 81 MG: 81 TABLET, COATED ORAL at 07:52

## 2024-02-19 RX ADMIN — Medication: at 11:57

## 2024-02-19 RX ADMIN — LEVOTHYROXINE SODIUM 200 MCG: 100 TABLET ORAL at 05:52

## 2024-02-19 RX ADMIN — CITALOPRAM HYDROBROMIDE 30 MG: 10 TABLET ORAL at 07:45

## 2024-02-19 RX ADMIN — FUROSEMIDE 10 MG/HR: 10 INJECTION, SOLUTION INTRAMUSCULAR; INTRAVENOUS at 00:40

## 2024-02-19 RX ADMIN — MICAFUNGIN SODIUM 100 MG: 100 INJECTION, POWDER, LYOPHILIZED, FOR SOLUTION INTRAVENOUS at 17:46

## 2024-02-19 RX ADMIN — FUROSEMIDE 40 MG/HR: 10 INJECTION, SOLUTION INTRAMUSCULAR; INTRAVENOUS at 21:25

## 2024-02-19 RX ADMIN — HEPARIN SODIUM 7500 UNITS: 5000 INJECTION INTRAVENOUS; SUBCUTANEOUS at 05:52

## 2024-02-19 RX ADMIN — MIDODRINE HYDROCHLORIDE 10 MG: 5 TABLET ORAL at 14:19

## 2024-02-19 RX ADMIN — Medication 5000 UNITS: at 07:45

## 2024-02-19 RX ADMIN — HYDROCORTISONE SODIUM SUCCINATE 50 MG: 100 INJECTION, POWDER, FOR SOLUTION INTRAMUSCULAR; INTRAVENOUS at 00:47

## 2024-02-19 RX ADMIN — HEPARIN SODIUM 1500 UNITS: 1000 INJECTION INTRAVENOUS; SUBCUTANEOUS at 21:11

## 2024-02-19 RX ADMIN — POTASSIUM BICARBONATE 20 MEQ: 782 TABLET, EFFERVESCENT ORAL at 07:52

## 2024-02-19 RX ADMIN — ALBUMIN (HUMAN) 25 G: 12.5 INJECTION, SOLUTION INTRAVENOUS at 21:48

## 2024-02-19 RX ADMIN — HYDROCORTISONE SODIUM SUCCINATE 50 MG: 100 INJECTION, POWDER, FOR SOLUTION INTRAMUSCULAR; INTRAVENOUS at 13:11

## 2024-02-19 RX ADMIN — ACETAMINOPHEN 1000 MG: 500 TABLET ORAL at 14:19

## 2024-02-19 RX ADMIN — DOCUSATE SODIUM LIQUID 100 MG: 100 LIQUID ORAL at 07:54

## 2024-02-19 RX ADMIN — ACETAMINOPHEN 1000 MG: 500 TABLET ORAL at 05:51

## 2024-02-19 RX ADMIN — CEFTAZIDIME, AVIBACTAM 1.25 G: 2; .5 POWDER, FOR SOLUTION INTRAVENOUS at 23:57

## 2024-02-19 RX ADMIN — DOCUSATE SODIUM LIQUID 100 MG: 100 LIQUID ORAL at 23:43

## 2024-02-19 RX ADMIN — Medication 40 MCG/MIN: at 16:43

## 2024-02-19 RX ADMIN — VASOPRESSIN 0.04 UNITS/MIN: 0.2 INJECTION INTRAVENOUS at 10:05

## 2024-02-19 RX ADMIN — MINERAL SUPPLEMENT IRON 300 MG / 5 ML STRENGTH LIQUID 100 PER BOX UNFLAVORED 300 MG: at 07:45

## 2024-02-19 RX ADMIN — VASOPRESSIN 0.04 UNITS/MIN: 0.2 INJECTION INTRAVENOUS at 04:08

## 2024-02-19 RX ADMIN — Medication 30 MCG/MIN: at 22:18

## 2024-02-19 RX ADMIN — SODIUM CHLORIDE: 9 INJECTION, SOLUTION INTRAVENOUS at 18:56

## 2024-02-19 RX ADMIN — MAGNESIUM SULFATE HEPTAHYDRATE 2000 MG: 40 INJECTION, SOLUTION INTRAVENOUS at 10:12

## 2024-02-19 RX ADMIN — HEPARIN SODIUM 1700 UNITS: 1000 INJECTION INTRAVENOUS; SUBCUTANEOUS at 21:12

## 2024-02-19 RX ADMIN — HYDROCORTISONE SODIUM SUCCINATE 50 MG: 100 INJECTION, POWDER, FOR SOLUTION INTRAMUSCULAR; INTRAVENOUS at 06:28

## 2024-02-19 RX ADMIN — SENNOSIDES 8.6 MG: 8.6 TABLET, FILM COATED ORAL at 07:52

## 2024-02-19 RX ADMIN — FLUDROCORTISONE ACETATE 0.05 MG: 0.1 TABLET ORAL at 13:11

## 2024-02-19 RX ADMIN — CEFTAZIDIME, AVIBACTAM 1.25 G: 2; .5 POWDER, FOR SOLUTION INTRAVENOUS at 05:24

## 2024-02-19 RX ADMIN — HEPARIN SODIUM 7500 UNITS: 5000 INJECTION INTRAVENOUS; SUBCUTANEOUS at 23:44

## 2024-02-19 RX ADMIN — FOLIC ACID 1 MG: 1 TABLET ORAL at 07:54

## 2024-02-19 RX ADMIN — VASOPRESSIN 0.04 UNITS/MIN: 0.2 INJECTION INTRAVENOUS at 18:55

## 2024-02-19 RX ADMIN — FENTANYL CITRATE 50 MCG: 50 INJECTION INTRAMUSCULAR; INTRAVENOUS at 06:28

## 2024-02-19 RX ADMIN — ACETAMINOPHEN 1000 MG: 500 TABLET ORAL at 23:45

## 2024-02-19 RX ADMIN — SODIUM CHLORIDE, PRESERVATIVE FREE 10 ML: 5 INJECTION INTRAVENOUS at 07:52

## 2024-02-19 RX ADMIN — FUROSEMIDE 40 MG/HR: 10 INJECTION, SOLUTION INTRAMUSCULAR; INTRAVENOUS at 10:07

## 2024-02-19 RX ADMIN — Medication: at 11:58

## 2024-02-19 RX ADMIN — MIDODRINE HYDROCHLORIDE 10 MG: 5 TABLET ORAL at 07:52

## 2024-02-19 RX ADMIN — Medication 25 MCG/MIN: at 08:20

## 2024-02-19 RX ADMIN — SODIUM CHLORIDE: 9 INJECTION, SOLUTION INTRAVENOUS at 12:24

## 2024-02-19 RX ADMIN — Medication: at 11:55

## 2024-02-19 RX ADMIN — SENNOSIDES 8.6 MG: 8.6 TABLET, FILM COATED ORAL at 23:45

## 2024-02-19 RX ADMIN — HEPARIN SODIUM 7500 UNITS: 5000 INJECTION INTRAVENOUS; SUBCUTANEOUS at 14:20

## 2024-02-19 RX ADMIN — AMIODARONE HYDROCHLORIDE 200 MG: 200 TABLET ORAL at 07:52

## 2024-02-19 RX ADMIN — HYDROCORTISONE SODIUM SUCCINATE 50 MG: 100 INJECTION, POWDER, FOR SOLUTION INTRAMUSCULAR; INTRAVENOUS at 18:12

## 2024-02-19 RX ADMIN — SODIUM CHLORIDE: 9 INJECTION, SOLUTION INTRAVENOUS at 10:02

## 2024-02-19 RX ADMIN — PANTOPRAZOLE SODIUM 40 MG: 40 INJECTION, POWDER, FOR SOLUTION INTRAVENOUS at 07:52

## 2024-02-19 RX ADMIN — MAGNESIUM SULFATE HEPTAHYDRATE 2000 MG: 40 INJECTION, SOLUTION INTRAVENOUS at 08:09

## 2024-02-19 RX ADMIN — CEFTAZIDIME, AVIBACTAM 1.25 G: 2; .5 POWDER, FOR SOLUTION INTRAVENOUS at 13:14

## 2024-02-19 RX ADMIN — MIDODRINE HYDROCHLORIDE 10 MG: 5 TABLET ORAL at 23:44

## 2024-02-19 ASSESSMENT — PULMONARY FUNCTION TESTS
PIF_VALUE: 27
PIF_VALUE: 15
PIF_VALUE: 14
PIF_VALUE: 26
PIF_VALUE: 24
PIF_VALUE: 14
PIF_VALUE: 28
PIF_VALUE: 14

## 2024-02-19 NOTE — PLAN OF CARE
Problem: Respiratory - Adult  Goal: Achieves optimal ventilation and oxygenation  2/19/2024 0859 by Zachariah Crook, AGNIESZKA  Outcome: Progressing

## 2024-02-19 NOTE — PLAN OF CARE
Problem: Discharge Planning  Goal: Discharge to home or other facility with appropriate resources  Outcome: Progressing     Problem: Pain  Goal: Verbalizes/displays adequate comfort level or baseline comfort level  Outcome: Progressing     Problem: Safety - Adult  Goal: Free from fall injury  Outcome: Progressing     Problem: Chronic Conditions and Co-morbidities  Goal: Patient's chronic conditions and co-morbidity symptoms are monitored and maintained or improved  Outcome: Progressing     Problem: Respiratory - Adult  Goal: Achieves optimal ventilation and oxygenation  Outcome: Progressing     Problem: Nutrition Deficit:  Goal: Optimize nutritional status  Outcome: Progressing

## 2024-02-19 NOTE — CARE COORDINATION
TRANSITIONAL CARE PLANNING/ DISCHARGE ONGOING EVALUATION    Hospital Day: 3    Reason for Admission: Open wound of abdomen, initial encounter [S31.109A]  Abdominal pain [R10.9]     Treatment Plan of Care: CRRT,     Tests/Procedures still needed: ABD Skin Graft planned     Barriers to Discharge: Necrotizing Faciitis    Readmission Risk              Risk of Unplanned Readmission:  67            Patient goals/Treatment Preferences/Transitional Plan:     Referrals Made: Eamon Yap

## 2024-02-19 NOTE — PLAN OF CARE
Problem: Discharge Planning  Goal: Discharge to home or other facility with appropriate resources  Outcome: Progressing  Flowsheets (Taken 2/19/2024 0800)  Discharge to home or other facility with appropriate resources:   Identify barriers to discharge with patient and caregiver   Arrange for needed discharge resources and transportation as appropriate   Identify discharge learning needs (meds, wound care, etc)     Problem: Pain  Goal: Verbalizes/displays adequate comfort level or baseline comfort level  Outcome: Progressing  Flowsheets (Taken 2/19/2024 0800)  Verbalizes/displays adequate comfort level or baseline comfort level:   Encourage patient to monitor pain and request assistance   Assess pain using appropriate pain scale   Administer analgesics based on type and severity of pain and evaluate response   Implement non-pharmacological measures as appropriate and evaluate response     Problem: Safety - Adult  Goal: Free from fall injury  Outcome: Progressing     Problem: Chronic Conditions and Co-morbidities  Goal: Patient's chronic conditions and co-morbidity symptoms are monitored and maintained or improved  Outcome: Progressing  Flowsheets (Taken 2/19/2024 0800)  Care Plan - Patient's Chronic Conditions and Co-Morbidity Symptoms are Monitored and Maintained or Improved:   Monitor and assess patient's chronic conditions and comorbid symptoms for stability, deterioration, or improvement   Collaborate with multidisciplinary team to address chronic and comorbid conditions and prevent exacerbation or deterioration     Problem: Respiratory - Adult  Goal: Achieves optimal ventilation and oxygenation  2/19/2024 1720 by Etelvina Ireland RN  Outcome: Progressing  2/19/2024 0859 by Zachariah Crook RCP  Outcome: Progressing     Problem: Nutrition Deficit:  Goal: Optimize nutritional status  Outcome: Progressing  Flowsheets (Taken 2/19/2024 1147 by Sally Mc, RD, LD)  Nutrient intake appropriate for

## 2024-02-20 ENCOUNTER — APPOINTMENT (OUTPATIENT)
Dept: NUCLEAR MEDICINE | Age: 59
DRG: 853 | End: 2024-02-20
Attending: SURGERY
Payer: MEDICARE

## 2024-02-20 VITALS
WEIGHT: 315 LBS | HEIGHT: 70 IN | DIASTOLIC BLOOD PRESSURE: 46 MMHG | TEMPERATURE: 97.7 F | BODY MASS INDEX: 45.1 KG/M2 | OXYGEN SATURATION: 88 % | SYSTOLIC BLOOD PRESSURE: 90 MMHG

## 2024-02-20 PROBLEM — Z51.5 PALLIATIVE CARE ENCOUNTER: Status: ACTIVE | Noted: 2024-01-03

## 2024-02-20 LAB
ABO + RH BLD: NORMAL
ABO/RH: NORMAL
ALBUMIN SERPL-MCNC: 2.4 G/DL (ref 3.5–5.2)
ALBUMIN/GLOB SERPL: 1 {RATIO} (ref 1–2.5)
ALP SERPL-CCNC: 851 U/L (ref 40–129)
ALT SERPL-CCNC: 27 U/L (ref 5–41)
AMMONIA PLAS-SCNC: 149 UMOL/L (ref 16–60)
ANION GAP SERPL CALCULATED.3IONS-SCNC: 18 MMOL/L (ref 9–17)
ANION GAP SERPL CALCULATED.3IONS-SCNC: 23 MMOL/L (ref 9–17)
ANION GAP SERPL CALCULATED.3IONS-SCNC: 25 MMOL/L (ref 9–17)
ANTIBODY SCREEN: NEGATIVE
ARM BAND NUMBER: NORMAL
ARM BAND NUMBER: NORMAL
AST SERPL-CCNC: 32 U/L
BILIRUB DIRECT SERPL-MCNC: 2.6 MG/DL
BILIRUB INDIRECT SERPL-MCNC: 0.7 MG/DL (ref 0–1)
BILIRUB SERPL-MCNC: 3.3 MG/DL (ref 0.3–1.2)
BLOOD BANK BLOOD PRODUCT EXPIRATION DATE: NORMAL
BLOOD BANK BLOOD PRODUCT EXPIRATION DATE: NORMAL
BLOOD BANK DISPENSE STATUS: NORMAL
BLOOD BANK DISPENSE STATUS: NORMAL
BLOOD BANK ISBT PRODUCT BLOOD TYPE: 6200
BLOOD BANK ISBT PRODUCT BLOOD TYPE: 6200
BLOOD BANK PRODUCT CODE: NORMAL
BLOOD BANK SAMPLE EXPIRATION: NORMAL
BLOOD BANK SAMPLE EXPIRATION: NORMAL
BLOOD BANK UNIT TYPE AND RH: NORMAL
BLOOD BANK UNIT TYPE AND RH: NORMAL
BLOOD GROUP ANTIBODIES SERPL: NEGATIVE
BPU ID: NORMAL
BPU ID: NORMAL
BUN BLD-MCNC: 16 MG/DL (ref 8–26)
BUN SERPL-MCNC: 12 MG/DL (ref 6–20)
BUN SERPL-MCNC: 15 MG/DL (ref 6–20)
BUN SERPL-MCNC: 17 MG/DL (ref 6–20)
CA-I BLD-SCNC: 1.19 MMOL/L (ref 1.13–1.33)
CA-I BLD-SCNC: 1.21 MMOL/L (ref 1.13–1.33)
CA-I BLD-SCNC: 1.23 MMOL/L (ref 1.15–1.33)
CALCIUM SERPL-MCNC: 8.4 MG/DL (ref 8.6–10.4)
CALCIUM SERPL-MCNC: 8.5 MG/DL (ref 8.6–10.4)
CALCIUM SERPL-MCNC: 8.6 MG/DL (ref 8.6–10.4)
CHLORIDE BLD-SCNC: 104 MMOL/L (ref 98–107)
CHLORIDE SERPL-SCNC: 102 MMOL/L (ref 98–107)
CO2 BLD CALC-SCNC: 17 MMOL/L (ref 22–30)
CO2 SERPL-SCNC: 10 MMOL/L (ref 20–31)
CO2 SERPL-SCNC: 16 MMOL/L (ref 20–31)
CO2 SERPL-SCNC: 8 MMOL/L (ref 20–31)
COMPONENT: NORMAL
COMPONENT: NORMAL
CREAT SERPL-MCNC: 1.7 MG/DL (ref 0.7–1.2)
CREAT SERPL-MCNC: 1.9 MG/DL (ref 0.7–1.2)
CREAT SERPL-MCNC: 2.2 MG/DL (ref 0.7–1.2)
CROSSMATCH RESULT: NORMAL
CROSSMATCH RESULT: NORMAL
EGFR, POC: 34 ML/MIN/1.73M2
EKG ATRIAL RATE: 108 BPM
EKG ATRIAL RATE: 125 BPM
EKG P-R INTERVAL: 168 MS
EKG Q-T INTERVAL: 406 MS
EKG Q-T INTERVAL: 454 MS
EKG QRS DURATION: 184 MS
EKG QRS DURATION: 190 MS
EKG QTC CALCULATION (BAZETT): 544 MS
EKG QTC CALCULATION (BAZETT): 625 MS
EKG R AXIS: -46 DEGREES
EKG R AXIS: -52 DEGREES
EKG T AXIS: 113 DEGREES
EKG T AXIS: 119 DEGREES
EKG VENTRICULAR RATE: 108 BPM
EKG VENTRICULAR RATE: 114 BPM
ERYTHROCYTE [DISTWIDTH] IN BLOOD BY AUTOMATED COUNT: 19.6 % (ref 11.8–14.4)
ERYTHROCYTE [DISTWIDTH] IN BLOOD BY AUTOMATED COUNT: 20.8 % (ref 11.8–14.4)
FIO2: 40
GFR SERPL CREATININE-BSD FRML MDRD: 34 ML/MIN/1.73M2
GFR SERPL CREATININE-BSD FRML MDRD: 40 ML/MIN/1.73M2
GFR SERPL CREATININE-BSD FRML MDRD: 46 ML/MIN/1.73M2
GLUCOSE BLD-MCNC: 152 MG/DL (ref 74–100)
GLUCOSE BLD-MCNC: 163 MG/DL (ref 75–110)
GLUCOSE BLD-MCNC: 163 MG/DL (ref 75–110)
GLUCOSE BLD-MCNC: 66 MG/DL (ref 75–110)
GLUCOSE BLD-MCNC: 78 MG/DL (ref 75–110)
GLUCOSE SERPL-MCNC: 125 MG/DL (ref 70–99)
GLUCOSE SERPL-MCNC: 171 MG/DL (ref 70–99)
GLUCOSE SERPL-MCNC: 36 MG/DL (ref 70–99)
HCT VFR BLD AUTO: 26.3 % (ref 40.7–50.3)
HCT VFR BLD AUTO: 31.2 % (ref 40.7–50.3)
HCT VFR BLD AUTO: 33 % (ref 41–53)
HGB BLD-MCNC: 8.7 G/DL (ref 13–17)
HGB BLD-MCNC: 9.4 G/DL (ref 13–17)
INR PPP: 3.8
INR PPP: 4.9
LACTIC ACID, WHOLE BLOOD: 5.8 MMOL/L (ref 0.7–2.1)
MAGNESIUM SERPL-MCNC: 2.1 MG/DL (ref 1.6–2.6)
MAGNESIUM SERPL-MCNC: 2.3 MG/DL (ref 1.6–2.6)
MCH RBC QN AUTO: 29.5 PG (ref 25.2–33.5)
MCH RBC QN AUTO: 29.7 PG (ref 25.2–33.5)
MCHC RBC AUTO-ENTMCNC: 30.1 G/DL (ref 28.4–34.8)
MCHC RBC AUTO-ENTMCNC: 33.1 G/DL (ref 28.4–34.8)
MCV RBC AUTO: 89.8 FL (ref 82.6–102.9)
MCV RBC AUTO: 97.8 FL (ref 82.6–102.9)
NEGATIVE BASE EXCESS, ART: 9 MMOL/L (ref 0–2)
NRBC BLD-RTO: 0.3 PER 100 WBC
NRBC BLD-RTO: 2.3 PER 100 WBC
PARTIAL THROMBOPLASTIN TIME: 127.1 SEC (ref 23–36.5)
PATIENT TEMP: 35.6
PHOSPHATE SERPL-MCNC: 2.9 MG/DL (ref 2.5–4.5)
PHOSPHATE SERPL-MCNC: 4.3 MG/DL (ref 2.5–4.5)
PLATELET # BLD AUTO: 62 K/UL (ref 138–453)
PLATELET # BLD AUTO: ABNORMAL K/UL (ref 138–453)
PLATELET, FLUORESCENCE: 42 K/UL (ref 138–453)
PLATELETS.RETICULATED NFR BLD AUTO: 4.2 % (ref 1.1–10.3)
PMV BLD AUTO: 10.7 FL (ref 8.1–13.5)
POC ANION GAP: 29 MMOL/L (ref 7–16)
POC CREATININE: 2.2 MG/DL (ref 0.51–1.19)
POC HCO3: 16.9 MMOL/L (ref 21–28)
POC HEMOGLOBIN (CALC): 11.1 G/DL (ref 13.5–17.5)
POC LACTIC ACID: 6.6 MMOL/L (ref 0.56–1.39)
POC O2 SATURATION: 96.2 % (ref 94–98)
POC PCO2: 35.5 MM HG (ref 35–48)
POC PH: 7.28 (ref 7.35–7.45)
POC PO2: 92.3 MM HG (ref 83–108)
POTASSIUM BLD-SCNC: 3.5 MMOL/L (ref 3.5–4.5)
POTASSIUM SERPL-SCNC: 3.6 MMOL/L (ref 3.7–5.3)
POTASSIUM SERPL-SCNC: 4 MMOL/L (ref 3.7–5.3)
POTASSIUM SERPL-SCNC: 4.4 MMOL/L (ref 3.7–5.3)
PROT SERPL-MCNC: 4.9 G/DL (ref 6.4–8.3)
PROTHROMBIN TIME: 36.1 SEC (ref 11.7–14.9)
PROTHROMBIN TIME: 44.2 SEC (ref 11.7–14.9)
RBC # BLD AUTO: 2.93 M/UL (ref 4.21–5.77)
RBC # BLD AUTO: 3.19 M/UL (ref 4.21–5.77)
SODIUM BLD-SCNC: 149 MMOL/L (ref 138–146)
SODIUM SERPL-SCNC: 135 MMOL/L (ref 135–144)
SODIUM SERPL-SCNC: 135 MMOL/L (ref 135–144)
SODIUM SERPL-SCNC: 136 MMOL/L (ref 135–144)
TRANSFUSION STATUS: NORMAL
TRANSFUSION STATUS: NORMAL
TROPONIN I SERPL HS-MCNC: 139 NG/L (ref 0–22)
UNIT DIVISION: 0
UNIT DIVISION: 0
UNIT ISSUE DATE/TIME: NORMAL
UNIT ISSUE DATE/TIME: NORMAL
WBC OTHER # BLD: 10 K/UL (ref 3.5–11.3)
WBC OTHER # BLD: 17 K/UL (ref 3.5–11.3)

## 2024-02-20 PROCEDURE — 2580000003 HC RX 258: Performed by: STUDENT IN AN ORGANIZED HEALTH CARE EDUCATION/TRAINING PROGRAM

## 2024-02-20 PROCEDURE — 82330 ASSAY OF CALCIUM: CPT

## 2024-02-20 PROCEDURE — C9113 INJ PANTOPRAZOLE SODIUM, VIA: HCPCS

## 2024-02-20 PROCEDURE — 94003 VENT MGMT INPAT SUBQ DAY: CPT

## 2024-02-20 PROCEDURE — 86901 BLOOD TYPING SEROLOGIC RH(D): CPT

## 2024-02-20 PROCEDURE — 6360000002 HC RX W HCPCS

## 2024-02-20 PROCEDURE — 85730 THROMBOPLASTIN TIME PARTIAL: CPT

## 2024-02-20 PROCEDURE — 83605 ASSAY OF LACTIC ACID: CPT

## 2024-02-20 PROCEDURE — 99233 SBSQ HOSP IP/OBS HIGH 50: CPT | Performed by: INTERNAL MEDICINE

## 2024-02-20 PROCEDURE — 83735 ASSAY OF MAGNESIUM: CPT

## 2024-02-20 PROCEDURE — 82803 BLOOD GASES ANY COMBINATION: CPT

## 2024-02-20 PROCEDURE — 86850 RBC ANTIBODY SCREEN: CPT

## 2024-02-20 PROCEDURE — 86927 PLASMA FRESH FROZEN: CPT

## 2024-02-20 PROCEDURE — 6360000002 HC RX W HCPCS: Performed by: STUDENT IN AN ORGANIZED HEALTH CARE EDUCATION/TRAINING PROGRAM

## 2024-02-20 PROCEDURE — 93010 ELECTROCARDIOGRAM REPORT: CPT | Performed by: INTERNAL MEDICINE

## 2024-02-20 PROCEDURE — 6360000002 HC RX W HCPCS: Performed by: INTERNAL MEDICINE

## 2024-02-20 PROCEDURE — 80048 BASIC METABOLIC PNL TOTAL CA: CPT

## 2024-02-20 PROCEDURE — 2580000003 HC RX 258: Performed by: INTERNAL MEDICINE

## 2024-02-20 PROCEDURE — 36430 TRANSFUSION BLD/BLD COMPNT: CPT

## 2024-02-20 PROCEDURE — P9041 ALBUMIN (HUMAN),5%, 50ML: HCPCS | Performed by: SURGERY

## 2024-02-20 PROCEDURE — 99223 1ST HOSP IP/OBS HIGH 75: CPT | Performed by: NURSE PRACTITIONER

## 2024-02-20 PROCEDURE — 2700000000 HC OXYGEN THERAPY PER DAY

## 2024-02-20 PROCEDURE — 85014 HEMATOCRIT: CPT

## 2024-02-20 PROCEDURE — 6370000000 HC RX 637 (ALT 250 FOR IP): Performed by: NURSE PRACTITIONER

## 2024-02-20 PROCEDURE — A4216 STERILE WATER/SALINE, 10 ML: HCPCS

## 2024-02-20 PROCEDURE — P9017 PLASMA 1 DONOR FRZ W/IN 8 HR: HCPCS

## 2024-02-20 PROCEDURE — 85610 PROTHROMBIN TIME: CPT

## 2024-02-20 PROCEDURE — 84484 ASSAY OF TROPONIN QUANT: CPT

## 2024-02-20 PROCEDURE — 2580000003 HC RX 258

## 2024-02-20 PROCEDURE — 86900 BLOOD TYPING SEROLOGIC ABO: CPT

## 2024-02-20 PROCEDURE — 82565 ASSAY OF CREATININE: CPT

## 2024-02-20 PROCEDURE — 87205 SMEAR GRAM STAIN: CPT

## 2024-02-20 PROCEDURE — 94761 N-INVAS EAR/PLS OXIMETRY MLT: CPT

## 2024-02-20 PROCEDURE — 2500000003 HC RX 250 WO HCPCS

## 2024-02-20 PROCEDURE — 87040 BLOOD CULTURE FOR BACTERIA: CPT

## 2024-02-20 PROCEDURE — 86403 PARTICLE AGGLUT ANTBDY SCRN: CPT

## 2024-02-20 PROCEDURE — 90945 DIALYSIS ONE EVALUATION: CPT | Performed by: INTERNAL MEDICINE

## 2024-02-20 PROCEDURE — 6370000000 HC RX 637 (ALT 250 FOR IP): Performed by: STUDENT IN AN ORGANIZED HEALTH CARE EDUCATION/TRAINING PROGRAM

## 2024-02-20 PROCEDURE — 2000000000 HC ICU R&B

## 2024-02-20 PROCEDURE — 6370000000 HC RX 637 (ALT 250 FOR IP)

## 2024-02-20 PROCEDURE — 85027 COMPLETE CBC AUTOMATED: CPT

## 2024-02-20 PROCEDURE — 82140 ASSAY OF AMMONIA: CPT

## 2024-02-20 PROCEDURE — 99213 OFFICE O/P EST LOW 20 MIN: CPT

## 2024-02-20 PROCEDURE — 37799 UNLISTED PX VASCULAR SURGERY: CPT

## 2024-02-20 PROCEDURE — 80076 HEPATIC FUNCTION PANEL: CPT

## 2024-02-20 PROCEDURE — 2500000003 HC RX 250 WO HCPCS: Performed by: INTERNAL MEDICINE

## 2024-02-20 PROCEDURE — 82947 ASSAY GLUCOSE BLOOD QUANT: CPT

## 2024-02-20 PROCEDURE — 84520 ASSAY OF UREA NITROGEN: CPT

## 2024-02-20 PROCEDURE — 85055 RETICULATED PLATELET ASSAY: CPT

## 2024-02-20 PROCEDURE — 84100 ASSAY OF PHOSPHORUS: CPT

## 2024-02-20 PROCEDURE — 36415 COLL VENOUS BLD VENIPUNCTURE: CPT

## 2024-02-20 PROCEDURE — 2500000003 HC RX 250 WO HCPCS: Performed by: STUDENT IN AN ORGANIZED HEALTH CARE EDUCATION/TRAINING PROGRAM

## 2024-02-20 PROCEDURE — 80051 ELECTROLYTE PANEL: CPT

## 2024-02-20 PROCEDURE — 6360000002 HC RX W HCPCS: Performed by: SURGERY

## 2024-02-20 PROCEDURE — 93005 ELECTROCARDIOGRAM TRACING: CPT

## 2024-02-20 RX ORDER — ASPIRIN 81 MG/1
81 TABLET, CHEWABLE ORAL DAILY
Status: DISCONTINUED | OUTPATIENT
Start: 2024-02-21 | End: 2024-02-20

## 2024-02-20 RX ORDER — DIGOXIN 0.25 MG/ML
250 INJECTION INTRAMUSCULAR; INTRAVENOUS ONCE
Status: COMPLETED | OUTPATIENT
Start: 2024-02-20 | End: 2024-02-20

## 2024-02-20 RX ORDER — LACTULOSE 10 G/15ML
20 SOLUTION ORAL EVERY 8 HOURS
Status: DISCONTINUED | OUTPATIENT
Start: 2024-02-20 | End: 2024-02-20

## 2024-02-20 RX ORDER — LORAZEPAM 2 MG/ML
0.5 INJECTION INTRAMUSCULAR
Status: DISCONTINUED | OUTPATIENT
Start: 2024-02-20 | End: 2024-02-21 | Stop reason: HOSPADM

## 2024-02-20 RX ORDER — MORPHINE SULFATE 2 MG/ML
2 INJECTION, SOLUTION INTRAMUSCULAR; INTRAVENOUS
Status: DISCONTINUED | OUTPATIENT
Start: 2024-02-20 | End: 2024-02-21 | Stop reason: HOSPADM

## 2024-02-20 RX ORDER — ALBUMIN, HUMAN INJ 5% 5 %
25 SOLUTION INTRAVENOUS ONCE
Status: COMPLETED | OUTPATIENT
Start: 2024-02-20 | End: 2024-02-20

## 2024-02-20 RX ORDER — GLYCOPYRROLATE 0.2 MG/ML
0.2 INJECTION INTRAMUSCULAR; INTRAVENOUS EVERY 4 HOURS PRN
Status: DISCONTINUED | OUTPATIENT
Start: 2024-02-20 | End: 2024-02-21 | Stop reason: HOSPADM

## 2024-02-20 RX ORDER — DEXTROSE MONOHYDRATE 25 G/50ML
25 INJECTION, SOLUTION INTRAVENOUS ONCE
Status: COMPLETED | OUTPATIENT
Start: 2024-02-20 | End: 2024-02-20

## 2024-02-20 RX ORDER — SODIUM CHLORIDE 9 MG/ML
INJECTION, SOLUTION INTRAVENOUS PRN
Status: DISCONTINUED | OUTPATIENT
Start: 2024-02-20 | End: 2024-02-20

## 2024-02-20 RX ORDER — PHENYLEPHRINE HCL IN 0.9% NACL 50MG/250ML
10-300 PLASTIC BAG, INJECTION (ML) INTRAVENOUS CONTINUOUS
Status: DISCONTINUED | OUTPATIENT
Start: 2024-02-20 | End: 2024-02-20

## 2024-02-20 RX ADMIN — VASOPRESSIN 0.04 UNITS/MIN: 0.2 INJECTION INTRAVENOUS at 12:08

## 2024-02-20 RX ADMIN — ASPIRIN 81 MG: 81 TABLET, COATED ORAL at 08:28

## 2024-02-20 RX ADMIN — Medication 30 MCG/MIN: at 17:54

## 2024-02-20 RX ADMIN — CEFTAZIDIME, AVIBACTAM 1.25 G: 2; .5 POWDER, FOR SOLUTION INTRAVENOUS at 06:21

## 2024-02-20 RX ADMIN — Medication: at 10:10

## 2024-02-20 RX ADMIN — MORPHINE SULFATE 2 MG: 2 INJECTION, SOLUTION INTRAMUSCULAR; INTRAVENOUS at 21:22

## 2024-02-20 RX ADMIN — HYDROCORTISONE SODIUM SUCCINATE 50 MG: 100 INJECTION, POWDER, FOR SOLUTION INTRAMUSCULAR; INTRAVENOUS at 00:09

## 2024-02-20 RX ADMIN — SODIUM BICARBONATE: 84 INJECTION, SOLUTION INTRAVENOUS at 20:09

## 2024-02-20 RX ADMIN — MIDODRINE HYDROCHLORIDE 10 MG: 5 TABLET ORAL at 08:27

## 2024-02-20 RX ADMIN — HYDROCORTISONE SODIUM SUCCINATE 50 MG: 100 INJECTION, POWDER, FOR SOLUTION INTRAMUSCULAR; INTRAVENOUS at 19:50

## 2024-02-20 RX ADMIN — MICAFUNGIN SODIUM 100 MG: 100 INJECTION, POWDER, LYOPHILIZED, FOR SOLUTION INTRAVENOUS at 11:26

## 2024-02-20 RX ADMIN — DEXTROSE MONOHYDRATE 25 G: 25 INJECTION, SOLUTION INTRAVENOUS at 19:23

## 2024-02-20 RX ADMIN — SODIUM CHLORIDE: 9 INJECTION, SOLUTION INTRAVENOUS at 05:08

## 2024-02-20 RX ADMIN — Medication 75 MCG/MIN: at 18:36

## 2024-02-20 RX ADMIN — Medication: at 00:17

## 2024-02-20 RX ADMIN — HYDROCORTISONE SODIUM SUCCINATE 50 MG: 100 INJECTION, POWDER, FOR SOLUTION INTRAMUSCULAR; INTRAVENOUS at 06:03

## 2024-02-20 RX ADMIN — AMIODARONE HYDROCHLORIDE 0.5 MG/MIN: 50 INJECTION, SOLUTION INTRAVENOUS at 11:20

## 2024-02-20 RX ADMIN — ACETAMINOPHEN 1000 MG: 500 TABLET ORAL at 06:03

## 2024-02-20 RX ADMIN — AMIODARONE HYDROCHLORIDE 0.5 MG/MIN: 50 INJECTION, SOLUTION INTRAVENOUS at 16:43

## 2024-02-20 RX ADMIN — HEPARIN SODIUM 7500 UNITS: 5000 INJECTION INTRAVENOUS; SUBCUTANEOUS at 06:03

## 2024-02-20 RX ADMIN — Medication 65 MCG/MIN: at 14:49

## 2024-02-20 RX ADMIN — CITALOPRAM HYDROBROMIDE 30 MG: 10 TABLET ORAL at 09:09

## 2024-02-20 RX ADMIN — HEPARIN SODIUM 1500 UNITS: 1000 INJECTION INTRAVENOUS; SUBCUTANEOUS at 20:53

## 2024-02-20 RX ADMIN — MINERAL SUPPLEMENT IRON 300 MG / 5 ML STRENGTH LIQUID 100 PER BOX UNFLAVORED 300 MG: at 09:09

## 2024-02-20 RX ADMIN — DOCUSATE SODIUM LIQUID 100 MG: 100 LIQUID ORAL at 08:27

## 2024-02-20 RX ADMIN — Medication: at 00:19

## 2024-02-20 RX ADMIN — ACETAMINOPHEN 1000 MG: 500 TABLET ORAL at 13:55

## 2024-02-20 RX ADMIN — MIDODRINE HYDROCHLORIDE 10 MG: 5 TABLET ORAL at 14:48

## 2024-02-20 RX ADMIN — VASOPRESSIN 0.04 UNITS/MIN: 0.2 INJECTION INTRAVENOUS at 20:19

## 2024-02-20 RX ADMIN — LACTULOSE 20 G: 20 SOLUTION ORAL at 12:42

## 2024-02-20 RX ADMIN — FLUDROCORTISONE ACETATE 0.05 MG: 0.1 TABLET ORAL at 09:09

## 2024-02-20 RX ADMIN — AMIODARONE HYDROCHLORIDE 150 MG: 50 INJECTION, SOLUTION INTRAVENOUS at 04:58

## 2024-02-20 RX ADMIN — VASOPRESSIN 0.04 UNITS/MIN: 0.2 INJECTION INTRAVENOUS at 03:32

## 2024-02-20 RX ADMIN — Medication 5000 UNITS: at 09:09

## 2024-02-20 RX ADMIN — Medication: at 10:09

## 2024-02-20 RX ADMIN — DEXTROSE MONOHYDRATE 25 G: 25 INJECTION, SOLUTION INTRAVENOUS at 20:12

## 2024-02-20 RX ADMIN — FUROSEMIDE 40 MG/HR: 10 INJECTION, SOLUTION INTRAMUSCULAR; INTRAVENOUS at 12:00

## 2024-02-20 RX ADMIN — Medication 0.5 MG: at 21:21

## 2024-02-20 RX ADMIN — AMIODARONE HYDROCHLORIDE 1 MG/MIN: 50 INJECTION, SOLUTION INTRAVENOUS at 05:34

## 2024-02-20 RX ADMIN — Medication: at 00:18

## 2024-02-20 RX ADMIN — PANTOPRAZOLE SODIUM 40 MG: 40 INJECTION, POWDER, FOR SOLUTION INTRAVENOUS at 08:28

## 2024-02-20 RX ADMIN — FOLIC ACID 1 MG: 1 TABLET ORAL at 08:28

## 2024-02-20 RX ADMIN — SODIUM CHLORIDE: 9 INJECTION, SOLUTION INTRAVENOUS at 06:19

## 2024-02-20 RX ADMIN — DIGOXIN 250 MCG: 0.25 INJECTION INTRAMUSCULAR; INTRAVENOUS at 08:26

## 2024-02-20 RX ADMIN — OXYCODONE 5 MG: 5 TABLET ORAL at 02:44

## 2024-02-20 RX ADMIN — HEPARIN SODIUM 7500 UNITS: 5000 INJECTION INTRAVENOUS; SUBCUTANEOUS at 13:55

## 2024-02-20 RX ADMIN — CALCIUM GLUCONATE 1000 MG: 20 INJECTION, SOLUTION INTRAVENOUS at 03:30

## 2024-02-20 RX ADMIN — HEPARIN SODIUM 1700 UNITS: 1000 INJECTION INTRAVENOUS; SUBCUTANEOUS at 20:54

## 2024-02-20 RX ADMIN — Medication 100 MEQ: at 19:34

## 2024-02-20 RX ADMIN — Medication: at 10:08

## 2024-02-20 RX ADMIN — POTASSIUM PHOSPHATE, MONOBASIC POTASSIUM PHOSPHATE, DIBASIC 15 MMOL: 224; 236 INJECTION, SOLUTION, CONCENTRATE INTRAVENOUS at 11:42

## 2024-02-20 RX ADMIN — LEVOTHYROXINE SODIUM 200 MCG: 100 TABLET ORAL at 08:27

## 2024-02-20 RX ADMIN — FENTANYL CITRATE 50 MCG: 50 INJECTION INTRAMUSCULAR; INTRAVENOUS at 01:26

## 2024-02-20 RX ADMIN — GLYCOPYRROLATE 0.2 MG: 0.2 INJECTION INTRAMUSCULAR; INTRAVENOUS at 21:27

## 2024-02-20 RX ADMIN — SODIUM BICARBONATE: 84 INJECTION, SOLUTION INTRAVENOUS at 17:05

## 2024-02-20 RX ADMIN — SODIUM BICARBONATE 100 MEQ: 84 INJECTION, SOLUTION INTRAVENOUS at 19:34

## 2024-02-20 RX ADMIN — HYDROCORTISONE SODIUM SUCCINATE 50 MG: 100 INJECTION, POWDER, FOR SOLUTION INTRAMUSCULAR; INTRAVENOUS at 13:55

## 2024-02-20 RX ADMIN — CEFTAZIDIME, AVIBACTAM 1.25 G: 2; .5 POWDER, FOR SOLUTION INTRAVENOUS at 15:05

## 2024-02-20 RX ADMIN — ALBUMIN (HUMAN) 25 G: 12.5 INJECTION, SOLUTION INTRAVENOUS at 09:07

## 2024-02-20 RX ADMIN — POTASSIUM BICARBONATE 20 MEQ: 782 TABLET, EFFERVESCENT ORAL at 09:10

## 2024-02-20 RX ADMIN — Medication 50 MCG/MIN: at 09:32

## 2024-02-20 ASSESSMENT — PULMONARY FUNCTION TESTS
PIF_VALUE: 15
PIF_VALUE: 20
PIF_VALUE: 13
PIF_VALUE: 37
PIF_VALUE: 36
PIF_VALUE: 19
PIF_VALUE: 42
PIF_VALUE: 24
PIF_VALUE: 14
PIF_VALUE: 36
PIF_VALUE: 22
PIF_VALUE: 28
PIF_VALUE: 23
PIF_VALUE: 34

## 2024-02-20 NOTE — CONSULTS
Clinical Ethics Consultation Note    History and Context:  Hx  Principal Problem:    Abdominal pain  Active Problems:    NSTEMI (non-ST elevated myocardial infarction) (Formerly [facility])    History of tracheostomy    Acute renal failure with tubular necrosis (HCC)    Bilateral lower extremity edema    Open abdominal wall wound, subsequent encounter    Shock (HCC)    Multiple drug resistant organism (MDRO) culture positive    Pseudomonas aeruginosa infection    ATN (acute tubular necrosis) (HCC)    Acute kidney injury superimposed on CKD (HCC)    Acute hypoxic respiratory failure (Formerly [facility])    Hypervolemia    Nonrheumatic aortic valve stenosis    Septic shock (Formerly [facility])  Resolved Problems:    * No resolved hospital problems. *    Age: 58 y.o.  Gender: male  Gender Identity: male  Admitted Date: 2/16/2024  Consult Date: 02/20/24  MRN: [id number]  Valid Advanced Medical Directive: No [place] NOK    Process:  Received consult request from trauma team with concerns for treatment appropriateness. Reviewed chart, spoke to care team.     Ethical Question(s) and Concern(s):  Treatment Appropriateness    Analysis:  This patient is familiar to the care team and has had several previous stays. Palliative has been consulted on previous stays. The patient and his four children: Richi Dimas; Igor Dimas; Tony Dimas; and Marta Dimas. During previous goals of care conversations, the patient and his children have been clear on a preference for full treatment and full code. This admission, the patient is experiencing MSOF (heart failure, chronic resp failure, liver failure, endocrine failure). While the patient has been critically ill before, the quality of the illnesses from this hospital stay are more severe than in the past. The concern of the care team is that continued interventions might be approaching futility of care.    The patient's children have, reportedly, not been in to see their father. In recent informed consent conversations with the patient's 
Comprehensive Nutrition Assessment    Type and Reason for Visit:  Reassess, Consult (Tube Feedings Order and Management)    Nutrition Recommendations/Plan:   Continue TF of Immune Enhancing formula at 20 mL/hr.  As able, suggest slowly advancing to goal rate of 55 mL/hr = 1980 kcals, 124 gm protein per day.  Monitor labs, weights, and plan of care.     Malnutrition Assessment:  Malnutrition Status:  Insufficient data (02/19/24 1153)    Context:  Chronic Illness     Findings of the 6 clinical characteristics of malnutrition:  Energy Intake:  Unable to assess  Weight Loss:  Mild weight loss   Body Fat Loss:  Mild body fat loss Orbital   Muscle Mass Loss:  Unable to assess  Fluid Accumulation:   (Moderate-Severe) Generalized, Extremities   Strength:  Not Performed    Nutrition Assessment:    Consulted for Tube Feedings.  Pt on vent via trach and receiving pressors.  Discussed plans for nutrition with RN.  RN reports plans to run TF at only 20 mL/hr.  Will provide TF goal rate recommendations.  Noted plans for CRRT.  Weights reviewed.  Labs reviewed: Na 133 mmol/L, Glucose 158 mg/dL, BUN 25 mg/dL, CR 3.1 mg/dL.  Meds include: Colace, Folic Acid, Solu-Cortef, Senokot.    Nutrition Related Findings:    Meds/labs reviewed   Wound Type: Multiple, Skin Tears, Surgical Incision, Wound Vac (Wound vac to abdomen)       Current Nutrition Intake & Therapies:    Average Meal Intake: NPO  Average Supplements Intake: NPO  Diet NPO Exceptions are: Sips of Water with Meds  ADULT TUBE FEEDING; PEG; Immune Enhancing; Continuous; 20; No; 30; Q 4 hours  Additional Calorie Sources:  None    Anthropometric Measures:  Height: 177.8 cm (5' 10\")  Ideal Body Weight (IBW): 166 lbs (75 kg)    Admission Body Weight: 172.8 kg (380 lb 15.3 oz)  Current Body Weight: 172.8 kg (380 lb 15.3 oz), 229.5 % IBW. Weight Source: Bed Scale  Current BMI (kg/m2): 54.7  Usual Body Weight: 192 kg (423 lb 5 oz) (10/19/23)  % Weight Change (Calculated): 
[facility] - Initial Consult Note  Today's Date and Time: 2/17/2024, 10:04 AM    Impression :   Concern for sepsis of unknown etiology-likely not abdominal wound  HAP-CRE and MDRO Pseudomonas Aeruginosa on sputum culture from 2/13/24  Concern for urinary tract infection  Recent hx of necrotizing fasciitis-s/p multiple I&D with skin substitute applications and wound vac placement-Most recently on 2/16/24  Acute on chronic diastolic CHF  Fluid overload  LEONARDA  Hyponatremia  Leukocytosis  Elevated troponin  Essential HTN with hypertensive heart failure  Paroxysmal A fib   Hypoxic respiratory failure s/p trach  Cirrhosis of the liver  Portal hypertension with esophageal varices  Venous stasis dermatitis  DM type 2 with diabetic neuropathy  Rt buttock decubitus   Morbid obesity     Recommendations:   2/16/24-Patient was initiated on IV Zosyn and Vancomycin on 2/17/24 per primary service  Sputum culture from 2/13/24 with CRE and MDRO Pseudomonas  D/C Zosyn and Vancomycin  Start Avycaz 1.25 gm IV q 8 hr  Obtain urine and sputum cultures  Follow-up on blood cultures  Wound care  Requested additional testing of Pseudomonas against Avycaz, cefiderocol, Imipenem-relebactam. The organism is resistant to ceftolozane/tazobactam.    Medical Decision Making/Summary/Discussion:2/17/2024     Daily Elements of Decision Making provided by Consulting Physician:    Note: I have independently performed the steps listed below as part of the medical decision making and evaluation.    Review of current Problems:  Today I am seeing the patient for the following problems:  LEONARDA on CKD stage III  Septic shock   DM2 with complications  Chronic diastolic CHF  A fib   Sepsis with MDRO Pseudomonas  Evaluation of Patient:  Patient in surgical ICU  In shock, requiring Levophed and vasopressin  Sepsis with MDRO Pseudomonas  Please see daily details in Interval Changes Section  Changes in physical exam:  On 
[facility]    Consult               Today's Date: 2/18/2024  Patient Name: Ruben Dimas  Date of admission: 2/16/2024  9:00 AM  Patient's age: 58 y.o., 1965  Admission Dx: Open wound of abdomen, initial encounter [S31.109A]  Abdominal pain [R10.9]    Requesting Physician: Mary Carmen Monte MD    Cardiac Evaluation Reason:  Elevated troponin's    History Obtained From: patient and chart review     History of Present Illness:    Ruben Dimas is a 58 y.o. male who was admitted to [facility] with altered mental status with confusion. He was found to be in respiratory failure and showed evidence of hypoxemia and hypercarbia. The patient required subsequent intubation and ventilatory support. He was also found to be hypotensive required fluids vasopressors. The patient remained on the ventilator until 10/27/2023 when he was extubated. Unfortunately he did not tolerate the extubation and required re-intubation and eventual tracheostomy and PEG tube placement on 11/09/2023. The patient also showed problems with chronic anemia. He exhibited episodes of bleeding per rectum on 11/24/2023. A CT of the abdomen was obtained following the bleeding episode. The CT raised concerns about a hypodense focal collection in the right paracolic gutter, either an intraperitoneal fluid collection or an area of hemorrhage.The patient was brought to [facility] from his long-term care facility for a scheduled abdominal debridement and wound VAC change on 2/16/2.  Patient was found to have elevated troponin for which cardiology was consulted.  On my evaluation patient is mechanically ventilated through tracheostomy with NSR 70s and BP of 106/87.    Past Medical History:   has a past medical history of A-fib (HCC), Alcoholic cirrhosis (HCC), Anxiety and depression, Arthritis, Back pain, chronic, Bandemia, Bipolar disorder (HCC), BPH (benign prostatic hyperplasia), Cellulitis of left lower extremity, Cellulitis 
Nephrology Consult Note    Reason for Consult:  \"increased creatinine, fluid overload\"  Requesting Physician:  Dr. Monte    Chief Complaint:  transferred from [facility] for wound vac changed and wound debridement    History Obtained From:  patient, EMR, RN    History of Present Illness:              This is a 58 y.o. male PMH CKD, baseline 1.3-1.5 chronic diastolic CHF, chronic indwelling wells, BPH, lymphedema, DM2, HTN, a-fib, etoh cirrhosis (sober over 10 years) PHG, esophageal varices, gastric carcinoid, metabolic encephalopathy, and bipolar disease. He presented to the hospital for evaluation of history of necrotizing soft tissue infection of the anterior abdominal wall following possible leakage of gastric contents and tube feeds in subcutaneous space due to a PEG tube.  Multiple debridements and wound VAC applications have been done..  Patient underwent wound preparation with application of substitute and wound VAC application on 1/31/2024.  Patient was discharged to [facility]. Patient arrives again for same procedure. Went back to OR, post op developed septic shock type picture with acute anemia, hypotension, elevated troponin's. Patient was on floor, and transferred to ICU. Patient had central line and arterial line placed and Wells placed 2/16, also received one unit of blood overnight.   Patient requires 50mcg of Versed, is on vasopressin and levophed on vent via trach, FiO2 40%. Given amp of bicarb and bicarb gtt started overnight.   Labs show Na 132 K 4.7 chloride 102 CO2 13 BUN 27 Cr 3.3, glucose 162, calcium 9.4, mag 1.8.   BNP 28,500, +UTI. ABG pH 7.179, pCo2 27.5 PO2 163.2 pHCO2 10.2.   Patient given IV lasix 40mg once last night and again this am. Lasix gtt started.   Overnight 160cc urine, thus far today around 60cc urine.   WBC 27.2 hgb 8.2, plat 156, AP 1429, ALT 64 , BiliT 1.5, albumin 1.9.   Patient baseline Cr 1.2-1.4.  Patient has been schedule for previous post hospitalization 
with:    [] Patient independently    [] Patient and Family    [x] Family or Healthcare DPOA independently    [] Unable to discuss with patient, family/DPOA not present    Code Status  Full Code    Other recommendations   - We will continue to provide comfort and support to the patient and the family    Palliative Care will continue to follow Mr. Dimas's care as needed.   Thank you for allowing Palliative Care to participate in the care of Mr. Dimas .      History of Present Illness     The patient is a 58 y.o. male who has extensive hospitalization history. Patient was hospitalized on October 19, 2023 for altered mental status requiring intubation, pressure support. He was extubated on 10/27/2023 but was subsequently reintubated on 11/1/2023 with trach/peg placement 11/8/2023. He was discharged to [facility]. He developed necrotizing fasciitis on abdomen at PEG site and was readmitted to [facility] on 1/1/2024. He had complaints of chest and abdominal pain with hypotension, low SpO2, and atrial fibrillation. Patient underwent gastrectomy with lysis of adhesions, debridement, explant of mesh, and placement on wound vac on 1/2/2024. On 1/3/2024 he had laparotomy reopened with open G tube placement, repair of ventral defect, debridement, ALBIN x2, and wound vac placement. Patient had multiple abdominal wound debridements with wound vac (Jan 5, 8, 13, 15, 21, 26, 31). Patient was treated with Meropenem, Clindamycin, and Vancomycin with additional treatment for pseudomonas pneumonia. Palliative care was consulted during that admission and family wished to continue aggressive treatment. Patient passed swallow study prior to discharge and he was discharged back to [facility] on 2/6/2024. Patient had abdominal debridement on 2/7/2024 & 2/12/2024 and discharged back to [facility]. Patient returned to [facility] for abdominal debridement on 2/16/2024. Plan was for discharged back to [facility] but was admitted due to concern for sepsis from

## 2024-02-20 NOTE — PLAN OF CARE
Plan of Care Note    GCS 6T (1,1,4) - acute dec in mental status last night - CT head unremarkable  - NH3 149  Levo 50 from 25, vaso - unable to wean and unresponsive to fluids or fluid removal  Midodrine 10 TID, 2/16 EF 45%, Afib w/ RVR overnight - Amio IV, dig x 1, cards following - pending cath when more stable  PRVC 16/580/10/40%  7.28/35/92/16 - metabolic acidosis  RENAL:  - BNP >70K, lasix gtt per neph, CRRT started 2/19   - doesn't seem fluid-responsive or responsive to fluid removal unfortunately  GI:   - TF held for inc pressor requirements  - Start lactulose for hyperammonemia  - T bili now 3.3, HIDA to r/o cholecystitis  - Abd wound - wound vac change/OR Friday w/ Dr Monte  ID - Avycaz, micafungin per ID; WBC 17 from 13 from 27  Endo - stress dose steroids: hydrocortisone/fludrocortisone; 45synthroid    Palliative and ethics consult - patient is currently in MSOF (heart failure, chronic resp failure, liver failure, endocrine failure) - I believe treatment is likely approaching what can reasonably be considered futile care if the patient continues to decline despite all aggressive interventions.      VTE - SQH 7.5k TID; SUP - Protonix  Full code  R fem CVC, R fem A-line, Clemons (chronic), L IJ HD catheter, trach, G-tube    Crit Care Time: 45 min    S. Luis E Wood MD  Acute Care Surgery

## 2024-02-20 NOTE — ACP (ADVANCE CARE PLANNING)
Dr. Barrett attempted to call patient's son Richi but no answer. Called patient's son, Igor. Spoke on speaker with myself and ANNIE Barnard CNP as witness.  Dr. Barrett updated Igor on patient's current status. Informed that patient is in multi organ system failure with increasing medical support.  Patient's son Igor verbalized understanding.  Igor states that throughout the years, his father (Ruben Dimas) has made it clear to him and his siblings that he would want everything done and to maintain a full code.  Igor states he understands the severity of patient's medical condition but that the family wishes to honor the patient's wishes and maintain Full Code.

## 2024-02-20 NOTE — PLAN OF CARE
Problem: Discharge Planning  Goal: Discharge to home or other facility with appropriate resources  2/20/2024 0436 by Apolinar Cruz RN  Outcome: Progressing  2/19/2024 1720 by Etelvina Ireland RN  Outcome: Progressing  Flowsheets (Taken 2/19/2024 0800)  Discharge to home or other facility with appropriate resources:   Identify barriers to discharge with patient and caregiver   Arrange for needed discharge resources and transportation as appropriate   Identify discharge learning needs (meds, wound care, etc)     Problem: Pain  Goal: Verbalizes/displays adequate comfort level or baseline comfort level  2/20/2024 0436 by Apolinar Cruz RN  Outcome: Progressing  2/19/2024 1720 by Etelvina Ireland RN  Outcome: Progressing  Flowsheets (Taken 2/19/2024 0800)  Verbalizes/displays adequate comfort level or baseline comfort level:   Encourage patient to monitor pain and request assistance   Assess pain using appropriate pain scale   Administer analgesics based on type and severity of pain and evaluate response   Implement non-pharmacological measures as appropriate and evaluate response     Problem: Safety - Adult  Goal: Free from fall injury  2/20/2024 0436 by Apolinar Cruz RN  Outcome: Progressing  2/19/2024 1720 by Etelvina Ireland RN  Outcome: Progressing     Problem: Chronic Conditions and Co-morbidities  Goal: Patient's chronic conditions and co-morbidity symptoms are monitored and maintained or improved  2/20/2024 0436 by Apolinar Cruz RN  Outcome: Progressing  2/19/2024 1720 by Etelvina Ireland RN  Outcome: Progressing  Flowsheets (Taken 2/19/2024 0800)  Care Plan - Patient's Chronic Conditions and Co-Morbidity Symptoms are Monitored and Maintained or Improved:   Monitor and assess patient's chronic conditions and comorbid symptoms for stability, deterioration, or improvement   Collaborate with multidisciplinary team to address chronic and comorbid conditions and prevent exacerbation or deterioration     Problem:

## 2024-02-20 NOTE — CONSENT
Informed Consent for Blood Component Transfusion Note    I have discussed with the son, Igor Dimas, the rationale for blood component transfusion; its benefits in treating or preventing fatigue, organ damage, or death; and its risk which includes mild transfusion reactions, rare risk of blood borne infection, or more serious but rare reactions. I have discussed the alternatives to transfusion, including the risk and consequences of not receiving transfusion. The son, Igor Dimas, had an opportunity to ask questions and had agreed to proceed with transfusion of blood components.    Electronically signed by SUNSHINE Dowling CNP on 2/20/24 at 12:45 PM EST

## 2024-02-21 LAB
BLOOD BANK BLOOD PRODUCT EXPIRATION DATE: NORMAL
BLOOD BANK BLOOD PRODUCT EXPIRATION DATE: NORMAL
BLOOD BANK DISPENSE STATUS: NORMAL
BLOOD BANK DISPENSE STATUS: NORMAL
BLOOD BANK ISBT PRODUCT BLOOD TYPE: 6200
BLOOD BANK ISBT PRODUCT BLOOD TYPE: 6200
BLOOD BANK PRODUCT CODE: NORMAL
BLOOD BANK PRODUCT CODE: NORMAL
BLOOD BANK UNIT TYPE AND RH: NORMAL
BLOOD BANK UNIT TYPE AND RH: NORMAL
BPU ID: NORMAL
BPU ID: NORMAL
COMPONENT: NORMAL
COMPONENT: NORMAL
GLUCOSE BLD-MCNC: 28 MG/DL (ref 74–100)
MICROORGANISM SPEC CULT: ABNORMAL
MICROORGANISM SPEC CULT: ABNORMAL
MICROORGANISM SPEC CULT: NORMAL
MICROORGANISM SPEC CULT: NORMAL
MICROORGANISM/AGENT SPEC: ABNORMAL
NEGATIVE BASE EXCESS, ART: 26.6 MMOL/L (ref 0–2)
POC HCO3: 6.9 MMOL/L (ref 21–28)
POC LACTIC ACID: 19.9 MMOL/L (ref 0.56–1.39)
POC O2 SATURATION: 66.4 % (ref 94–98)
POC PCO2: 41.4 MM HG (ref 35–48)
POC PH: 6.83 (ref 7.35–7.45)
POC PO2: 61.3 MM HG (ref 83–108)
SERVICE CMNT-IMP: NORMAL
SERVICE CMNT-IMP: NORMAL
SPECIMEN DESCRIPTION: ABNORMAL
SPECIMEN DESCRIPTION: NORMAL
SPECIMEN DESCRIPTION: NORMAL
TRANSFUSION STATUS: NORMAL
TRANSFUSION STATUS: NORMAL
UNIT DIVISION: 0
UNIT DIVISION: 0
UNIT ISSUE DATE/TIME: NORMAL
UNIT ISSUE DATE/TIME: NORMAL

## 2024-02-21 NOTE — DEATH NOTES
Death Pronouncement Note  Patient's Name: Ruben Dimas   Patient's YOB: 1965  MRN Number: [id number]    Admitting Provider: Mary Carmen Monte MD  Attending Provider: Mary Carmen Monte*    Patient was examined and the following were absent: Pulses, Blood Pressure, and Respiratory effort    I declared the patient dead on 2/20/2024 at 9:42 PM.    Preliminary Cause of Death: Cardiopulmonary arrest (HCC)     Electronically signed by Cat Yoo DO on 2/20/24 at 9:43 PM EST

## 2024-02-21 NOTE — DISCHARGE SUMMARY
DISCHARGE SUMMARY:    PATIENT NAME:  Ruben Dimas  YOB: 1965  MEDICAL RECORD NO. [id number]  DATE: 24  PRIMARY CARE PHYSICIAN: Ramy Lazo MD  ADMIT DATE:  2024    DISCHARGE DATE:    DISPOSITION:    ADMITTING DIAGNOSIS:   H/o necrotizing soft tissue infection, sepsis    DIAGNOSIS:   Patient Active Problem List   Diagnosis    Alcoholic cirrhosis of liver (HCC), sober since     Peripheral edema    Bipolar disorder (HCC)    Type 2 diabetes mellitus with diabetic neuropathy, with long-term current use of insulin (HCC)    Essential hypertension, currently hypotensive    Dyslipidemia    Weakness    Acute metabolic encephalopathy    FABI (obstructive sleep apnea)    Primary osteoarthritis of right knee    Type 2 diabetes mellitus with diabetic neuropathy (HCC)    Acquired hypothyroidism    Urine retention    Anemia    Slow transit constipation    Iron deficiency anemia due to chronic blood loss    Gastroesophageal reflux disease without esophagitis    LEONARDA (acute kidney injury) (HCC)    Secondary esophageal varices without bleeding (HCC)    Portal hypertension (HCC)    Obesity, morbid, BMI 50 or higher (HCC)    Venous stasis dermatitis of both lower extremities    Cellulitis of perineum    Chronic indwelling Clemons catheter    Anxiety and depression    Back pain, chronic    BPH (benign prostatic hyperplasia)    Chronic diastolic CHF (congestive heart failure) (HCC)    Cirrhosis (HCC)    Diabetes mellitus (HCC)    GERD (gastroesophageal reflux disease)    History of tracheostomy    Hepatitis    Hiatal hernia    Hypertension, essential    IBS (irritable bowel syndrome)    Morbid obesity with BMI of 45.0-49.9, adult (HCC)    Neuropathy    Chronic respiratory failure with hypoxia, on home oxygen therapy (HCC)    Sleep apnea    Thyroid disease    Urinary bladder neurogenic dysfunction    Acute UTI    Musculoskeletal immobility    Pyocystis    Myoclonic jerking

## 2024-02-21 NOTE — PLAN OF CARE
Problem: Respiratory - Adult  Goal: Achieves optimal ventilation and oxygenation  Flowsheets (Taken 2/20/2024 2000)  Achieves optimal ventilation and oxygenation:   Respiratory therapy support as indicated   Assess the need for suctioning and aspirate as needed   Assess for changes in respiratory status   Oxygen supplementation based on oxygen saturation or arterial blood gases   Assess for changes in mentation and behavior

## 2024-02-22 LAB
MICROORGANISM SPEC CULT: ABNORMAL
SERVICE CMNT-IMP: ABNORMAL
SPECIMEN DESCRIPTION: ABNORMAL

## 2024-02-22 NOTE — PROGRESS NOTES
02/17/24 1448   Airway Clearance   Sputum Method Obtained Tracheal   Sputum Amount Small   Sputum Color/Odor Yellow   Sputum Consistency Thick         Sputum sample sent to lab for culture.   
  ICU PROGRESS NOTE        PATIENT NAME: Ruben Dimas  MEDICAL RECORD NO. [id number]  DATE: 2/17/2024    PRIMARY CARE PHYSICIAN: Ramy Lazo MD    HD: # 1    ASSESSMENT    Patient Active Problem List   Diagnosis    Alcoholic cirrhosis of liver (HCC), sober since 2013    Peripheral edema    Bipolar disorder (HCC)    Type 2 diabetes mellitus with diabetic neuropathy, with long-term current use of insulin (HCC)    Essential hypertension, currently hypotensive    Dyslipidemia    Weakness    Acute metabolic encephalopathy    FABI (obstructive sleep apnea)    Primary osteoarthritis of right knee    Type 2 diabetes mellitus with diabetic neuropathy (HCC)    Acquired hypothyroidism    Urine retention    Anemia    Slow transit constipation    Iron deficiency anemia due to chronic blood loss    Gastroesophageal reflux disease without esophagitis    LEONARDA (acute kidney injury) (HCC)    Secondary esophageal varices without bleeding (HCC)    Portal hypertension (HCC)    Obesity, morbid, BMI 50 or higher (HCC)    Venous stasis dermatitis of both lower extremities    Cellulitis of perineum    Chronic indwelling Clemons catheter    Anxiety and depression    Back pain, chronic    BPH (benign prostatic hyperplasia)    Chronic diastolic CHF (congestive heart failure) (HCC)    Cirrhosis (HCC)    Diabetes mellitus (HCC)    GERD (gastroesophageal reflux disease)    Hepatitis    Hiatal hernia    Hypertension, essential    IBS (irritable bowel syndrome)    Morbid obesity with BMI of 45.0-49.9, adult (HCC)    Neuropathy    Chronic respiratory failure with hypoxia, on home oxygen therapy (HCC)    Sleep apnea    Thyroid disease    Urinary bladder neurogenic dysfunction    Acute UTI    Musculoskeletal immobility    Pyocystis    Myoclonic jerking    Thrombocytopenia (HCC)    Hypotension    Sepsis with acute hypercapnic respiratory failure and septic shock (HCC)    Right lower lobe pneumonia    Acute kidney injury superimposed on CKD (HCC)    
  ICU PROGRESS NOTE        PATIENT NAME: Ruben Dimas  MEDICAL RECORD NO. [id number]  DATE: 2/19/2024    PRIMARY CARE PHYSICIAN: Ramy Lazo MD    HD: # 3    ASSESSMENT    Patient Active Problem List   Diagnosis    Alcoholic cirrhosis of liver (HCC), sober since 2013    Peripheral edema    Bipolar disorder (HCC)    Type 2 diabetes mellitus with diabetic neuropathy, with long-term current use of insulin (HCC)    Essential hypertension, currently hypotensive    Dyslipidemia    Weakness    Acute metabolic encephalopathy    FABI (obstructive sleep apnea)    Primary osteoarthritis of right knee    Type 2 diabetes mellitus with diabetic neuropathy (HCC)    Acquired hypothyroidism    Urine retention    Anemia    Slow transit constipation    Iron deficiency anemia due to chronic blood loss    Gastroesophageal reflux disease without esophagitis    LEONARDA (acute kidney injury) (HCC)    Secondary esophageal varices without bleeding (HCC)    Portal hypertension (HCC)    Obesity, morbid, BMI 50 or higher (HCC)    Venous stasis dermatitis of both lower extremities    Cellulitis of perineum    Chronic indwelling Clemons catheter    Anxiety and depression    Back pain, chronic    BPH (benign prostatic hyperplasia)    Chronic diastolic CHF (congestive heart failure) (HCC)    Cirrhosis (HCC)    Diabetes mellitus (HCC)    GERD (gastroesophageal reflux disease)    Hepatitis    Hiatal hernia    Hypertension, essential    IBS (irritable bowel syndrome)    Morbid obesity with BMI of 45.0-49.9, adult (HCC)    Neuropathy    Chronic respiratory failure with hypoxia, on home oxygen therapy (HCC)    Sleep apnea    Thyroid disease    Urinary bladder neurogenic dysfunction    Acute UTI    Musculoskeletal immobility    Pyocystis    Myoclonic jerking    Thrombocytopenia (HCC)    Hypotension    Sepsis with acute hypercapnic respiratory failure and septic shock (HCC)    Right lower lobe pneumonia    Acute renal failure with tubular necrosis (HCC)    
  Physician Progress Note      PATIENT:               GUSTAVO LUCAS  CSN #:                  [id number]  :                       1965  ADMIT DATE:       2024 9:00 AM  DISCH DATE:        2024 12:15 AM  RESPONDING  PROVIDER #:        Mary Carmen Monte MD          QUERY TEXT:    Patient admitted for wound vac change and debridement as needed. Per Op note   dated  documentation of debridement includes:  \"there is a 8 x 10 cm   segment on the right upper aspect that required sharp debridement of fibrinous   tissue\". To accurately reflect the procedure performed please document if   debridement was excisional or nonexcisional and the deepest depth of tissue   removed as down to and including:    The medical record reflects the following:  Risk Factors: s/p necrotizing fasciitis  Clinical Indicators: Op note documents: \"there is a 8 x 10 cm segment on the   right upper aspect that required sharp debridement of fibrinous tissue\"  Treatment: Debridement of abdomen in OR. Ceftazidine-avibactam1.25g q8h,    Zosyn 3.375mg q8h, Vanco 1-2 g. Monitor vital signs    Thank you for your time, Adelia MSN, RN CDS. If you have questions, please   call [phone] (M-F [phone]-3p).  Options provided:  -- Nonexcisional debridement of skin  -- Excisional debridement of skin  -- Nonexcisional debridement of subcutaneous tissue  -- Excisional debridement of subcutaneous tissue  -- Nonexcisional debridement of fascia  -- Excisional debridement of fascia  -- Nonexcisional debridement of muscle  -- Excisional debridement of muscle  -- Other - I will add my own diagnosis  -- Disagree - Not applicable / Not valid  -- Disagree - Clinically unable to determine / Unknown  -- Refer to Clinical Documentation Reviewer    PROVIDER RESPONSE TEXT:    Excisional debridement of fascia of abdomen was performed during procedure on   2024.    Query created by: Adelia Velázquez on 2024 1:02 PM      QUERY TEXT:    Pt admitted for 
 [facility] Nurse  Consult Note       NAME:  Ruben Dimas  MEDICAL RECORD NUMBER:  [id number]  AGE: 58 y.o.   GENDER: male  : 1965  TODAY'S DATE:  2024    Subjective:     Reason for WOCN Evaluation and Assessment: \"coccyx wound\"      Ruben Dimas is a 58 y.o. male referred by:   [x] Physician  [] Nursing  [] Other:     Wound Identification:  Wound Type: pressure, non-healing surgical, and dermatitis  Contributing Factors: edema, chronic pressure, decreased mobility, shear force, obesity, decreased tissue oxygenation, incontinence of stool, and incontinence of urine  Multi-organ failure, pressors in use.       Known from prior admissions.     Objective:      BP (!) 90/46   Pulse 75   Temp (!) 96.1 °F (35.6 °C)   Resp 18   Ht 1.778 m (5' 10\")   Wt (!) 172.8 kg (381 lb)   SpO2 93%   BMI 54.67 kg/m²   Fantasma Risk Score:      LABS    CBC:   Lab Results   Component Value Date/Time    WBC 17.0 2024 04:34 AM    RBC 2.93 2024 04:34 AM    RBC 3.93 2012 10:04 AM    HGB 8.7 2024 04:34 AM    HCT 26.3 2024 04:34 AM     CMP:  Albumin:    Lab Results   Component Value Date/Time    LABALBU 2.4 2024 04:34 AM    LABALBU 4.0 2012 09:23 AM     PT/INR:    Lab Results   Component Value Date/Time    PROTIME 36.1 2024 11:54 AM    PROTIME 11.6 2012 09:23 AM    INR 3.8 2024 11:54 AM     HgBA1c:    Lab Results   Component Value Date/Time    LABA1C 5.2 2024 03:45 AM     PTT: No components found for: \"LABPTT\"      Assessment:    Sacrococcygeal, buttock and posterior thigh irritant contact dermatitis present upon admission.     Measurements:       24 1240   Wound 24 Abdomen Lateral;Lower;Right   Date First Assessed/Time First Assessed: 24 0800   Primary Wound Type: Soft Tissue Necrosis  Location: Abdomen  Wound Location Orientation: Lateral;Lower;Right  Wound Outcome: (c) Other (Comment)   Wound Image  Prior area of induration 
Assembled courtesy cart for family  
[facility]  PROGRESS NOTE    Shift date: 2/20/2024  Shift day: Tuesday   Shift # 2    Room # 1007/1007-01   Name: Ruben Dimas                Temple: Buddhism   Place of Uatsdin:     Referral:  From nurse for family support at end of life    Admit Date & Time: 2/16/2024  9:00 AM    Assessment:  Ruben Dimas is a 58 y.o. male in the hospital because of abdominal pain. Upon entering the room writer observes patient intubated and unresponsive, and half a dozen or so family members in the room. Family members presented as experiencing anticipatory grief, sad and tearful. One asked  for a prayer, bedside with family gathered. Others asked for time alone in the room, bedside.       Intervention:  Writer introduced self and title as  as a courtesy cart was wheeled in to the room, present to offer emotional and spiritual support.  offered active listening; spiritual care accompaniment; encouragement for each one to take care of themselves, and to say to the patient bedside whatever they wanted and needed to say; and said a prayer at family's request.       Outcome:  Family members were receptive and expressed gratitude for the 's presence.    Plan:  Chaplains will remain available to offer spiritual and emotional support as needed.      Electronically signed by Chaplain Chantel, on 2/20/2024 at 9:24 PM.  [facility]  [phone]     02/20/24 2119   Encounter Summary   Encounter Overview/Reason  Spiritual/Emotional Needs;Grief, Loss, and Adjustments;Family Care  (Anticipatory grief)   Service Provided For: Family   Referral/Consult From: Nurse   Support System Family members   Last Encounter  02/20/24   Complexity of Encounter Moderate   Begin Time 2030   End Time  2115   Total Time Calculated 45 min   Grief, Loss, and Adjustments   Type End of Life;Anticipatory Grief   Assessment/Intervention/Outcome   Assessment 
Comprehensive Nutrition Assessment    Type and Reason for Visit:  Initial (vent check)    Nutrition Recommendations/Plan:   Continue NPO  If medically appropriate, start Immune Enhancing TF at 20 mL/hr and increase by 10 mL/hr every 4 hours to a goal rate of 50 mL/hr for 23 hours continuous (synthroid). Goal provides: 1725 kcal, 108 gm pro, 862 mL free water.  If diet started pt has hx of texture modification. Provide ONS as able. Previous nocturnal TF: Immune-enhancing at 60 mL/hr x 10 hrs. Provides an additional 900 kcal, 56 g PRO, 450 mL free water  Monitor diet advancement vs. TF initiation, and tolerance.     Malnutrition Assessment:  Malnutrition Status:  Insufficient data (02/17/24 1028)    Context:  Chronic Illness     Findings of the 6 clinical characteristics of malnutrition:  Energy Intake:  Unable to assess  Weight Loss:  Unable to assess (weight fluctuations noted)     Body Fat Loss:  Unable to assess     Muscle Mass Loss:  Unable to assess    Fluid Accumulation:  Severe Extremities, Generalized   Strength:  Not Performed    Nutrition Assessment:    Vent check. +trach/PEG. Pt from LTAC, adm for wound care for necrotizing soft tissue of abdomen. s/p wound debridement, wound vac. LEONARDA on CKD stage 3. On previous adm ~2 weeks ago, pt on minced and moist diet, Ensure Enlive ONS, nocturnal TF r/t poor intake and healing. Tolerated Immune Enh formula. Meds: vasopressin, levophed, synthroid. Labs: Na 131 mmol/L, Glu 159 mg/dL. GFR 23, BUN 25, Cr 3. Noted weight fluctuations ?r/t fluid status. Noted +3 edema to all extremities, +2 generalized, +1 facial, +2 perineal.    Nutrition Related Findings:    Meds/labs reviewed Wound Type: Multiple, Skin Tears, Surgical Incision, Wound Vac (Wound vac to abdomen)       Current Nutrition Intake & Therapies:    Average Meal Intake: NPO  Average Supplements Intake: NPO  Diet NPO Exceptions are: Sips of Water with Meds  Additional Calorie Sources:  None    Anthropometric 
[facility] - Progress Note    Today's Date and Time: 2/19/2024, 6:55 AM    Impression :   Concern for sepsis of unknown etiology-likely not abdominal wound  HAP-CRE and MDRO Pseudomonas Aeruginosa on sputum culture from 2/13/24  Concern for urinary tract infection  Recent hx of necrotizing fasciitis-s/p multiple I&D with skin substitute applications and wound vac placement-Most recently on 2/16/24  Acute on chronic diastolic CHF  Fluid overload  LEONARDA  Hyponatremia  Leukocytosis  Elevated troponin  Essential HTN with hypertensive heart failure  Paroxysmal A fib   Hypoxic respiratory failure s/p trach  Cirrhosis of the liver  Portal hypertension with esophageal varices  Venous stasis dermatitis  DM type 2 with diabetic neuropathy  Rt buttock decubitus   Morbid obesity     Recommendations:   2/16/24-Patient was initiated on IV Zosyn and Vancomycin on 2/17/24 per primary service  Sputum culture from 2/13/24 with  and MDRO Pseudomonas  2/16/23: Urine with Acinetobacter and Candida parapsilosis    2/17/24: D/C Zosyn and Vancomycin  2/17/24: Started Avycaz 1.25 gm IV q 8 hr  2-19-24 Micafungin added for Betzy parapsilosis    Wound care  Requested additional testing of Pseudomonas against Avycaz, cefiderocol, Imipenem-relebactam. The organism is resistant to ceftolozane/tazobactam.    Medical Decision Making/Summary/Discussion:2/19/2024     Daily Elements of Decision Making provided by Consulting Physician:    Note: I have independently performed the steps listed below as part of the medical decision making and evaluation.    Review of current Problems:  Today I am seeing the patient for the following problems:  LEONARDA on CKD stage III  Septic shock   DM2 with complications  Chronic diastolic CHF  A fib   Sepsis with MDRO Pseudomonas  Evaluation of Patient:  Patient in surgical ICU  In shock, requiring Levophed and vasopressin  Sepsis with MDRO Pseudomonas  Please see daily details in 
[facility]   Progress Note                   Date:   2/19/2024  Patient name: Ruben Dimas  Date of admission:  2/16/2024  9:00 AM  MRN:   [id number]  YOB: 1965  PCP: Ramy Lazo MD    Reason for Admission:     Subjective:   Patient was seen and examined.  Remained on pressor support.  Currently on IV Lasix infusion as per nephrology.  Creatinine 3.3.  Troponin downtrending now.    Medications:   Scheduled Meds:   pantoprazole (PROTONIX) 40 mg in sodium chloride (PF) 0.9 % 10 mL injection  40 mg IntraVENous Daily    hydrocortisone sodium succinate PF  50 mg IntraVENous Q6H    midodrine  10 mg Oral TID    cefTAZidime-avibactam (AVYCAZ) 1.25 g in sodium chloride 0.9 % 100 mL IVPB  1.25 g IntraVENous Q8H    aspirin  81 mg Oral Daily    docusate  100 mg Oral BID    levothyroxine  200 mcg Oral Daily    acetaminophen  1,000 mg Oral 3 times per day    Vitamin D  5,000 Units Oral Daily    heparin (porcine)  7,500 Units SubCUTAneous 3 times per day    senna  1 tablet Oral BID    ferrous Sulfate  300 mg Oral Daily    folic acid  1 mg Oral Daily    amiodarone  200 mg Oral Daily    citalopram  30 mg Oral Daily    sodium chloride flush  5-40 mL IntraVENous 2 times per day    insulin lispro  0-16 Units SubCUTAneous 4x Daily AC & HS     Continuous Infusions:   furosemide (LASIX) 500 mg in sodium chloride 0.9 % 100 mL infusion 10 mg/hr (02/19/24 0040)    sodium chloride 0.9 % 1,000 mL infusion 150 mL/hr at 02/18/24 1445    sodium chloride Stopped (02/18/24 1444)    dextrose      norepinephrine 18 mcg/min (02/18/24 2051)    sodium chloride      vasopressin 0.04 Units/min (02/19/24 0408)     CBC:   Recent Labs     02/17/24  0413 02/18/24  0359 02/19/24  0348   WBC 27.2* 11.5* 13.7*   HGB 8.2* 7.5* 7.7*    75* 77*     BMP:    Recent Labs     02/18/24  1156 02/18/24  1907 02/19/24  0348   * 133* 133*   K 4.1 4.0 3.8    100 101   CO2 20 21 20   BUN 24* 24* 24*   CREATININE 3.2* 3.3* 
[facility]   Patient Death Note  DEATH   Shift date: 2023    Shift day: Tuesday  Shift #: 2                 Room # 1007/1007-01   Name: Ruben Dimas            Age: 58 y.o.  Gender: male          Hindu: Church      Place of Christian:   Admit Date & Time: 2024  9:00 AM     Referral: Nurse   Actual date of death: 2024  TOD: 21:42       SITUATION AT DEATH:   writing was not present during time of death.    IS THIS A 'S CASE?  No per RN.    SPIRITUAL/EMOTIONAL INTERVENTION:   writing not present at time of death.  writing was informed by Chaplain Jackson and GERMAIN Jiang that next of kin contact will be patient's son Richi Dimas and a FH has not been chosen. Richi will be contacting Spiritual Care department when selected.       Family Received Grief Packet?  Yes       NAME AND PHONE NUMBER OF DOCTOR SIGNING DEATH CERTIFICATE: Dr. Mary Carmen Red [phone]       home has not been selected by family per Chaplain Jackson and RN.      Copy of COMPLETED Release of Body Form Received?  Yes    Patient's belongings: Unknown at time of writing     HOME:  Name: [facility]  City: [facility]  Phone Number: TBD    NEXT OF KIN:  Name: Richi Dimas  Relationship: Son  Street Address: [address]  City: [place]   State: [place]  Zip code: [address]   Phone Number: [phone]    ANY FOLLOW-UP NEEDED?  Yes    IF SO, WHAT?  Richi (patient's son) will contact spiritual care with choice of FH.    Electronically signed by Chaplain Derrick, on 2024 at 11:35 PM.  [facility]  [phone]    
Nephrology Progress Note      SUBJECTIVE      Patient seen and examined.  He is currently on 2 pressors.    He is also maxed out on Lasix drip, urine output only between 40 to 50 mL an hour over the last 24 hours   Patient has a trach to vent.  FiO2 40%  Overall fairly positive fluid balance.  Cardiac echo from Friday showing evidence of EF around 45%, moderate to severe aortic stenosis, moderately elevated RVSP    Background history:  58-year-old gentleman with a history of type 2 diabetes mellitus, chronic kidney disease stage III with a baseline creatinine of around 1.4 also has history of aortic stenosis and chronic indwelling Clemons with prior history of urinary tract infections, developed necrotizing fasciitis after he had PEG tube leak in the intra-abdominal wall and he has had debridements and wound VAC placement.  He also has a trach.  He had debridement scheduled by general surgery this admission.  Following above, patient started developing hypotension and required pressors.  He also developed LEONARDA with poor urinary output.  Fluids were given however did not necessarily show any significant improvement in urinary output.  Overall positive fluid balance of approximately 6 L    OBJECTIVE      CURRENT TEMPERATURE:  Temp: 99.5 °F (37.5 °C)  MAXIMUM TEMPERATURE OVER 24HRS:  Temp (24hrs), Av °F (36.7 °C), Min:97.7 °F (36.5 °C), Max:99.5 °F (37.5 °C)    CURRENT RESPIRATORY RATE:  Respirations: 16  CURRENT PULSE:  Pulse: 98  CURRENT BLOOD PRESSURE:  BP: 128/67  24HR BLOOD PRESSURE RANGE:  Systolic (24hrs), Av , Min:101 , Max:128   ; Diastolic (24hrs), Av, Min:49, Max:90    24HR INTAKE/OUTPUT:    Intake/Output Summary (Last 24 hours) at 2024 1001  Last data filed at 2024 0900  Gross per 24 hour   Intake 2647.75 ml   Output 1279 ml   Net 1368.75 ml     WEIGHT :Patient Vitals for the past 96 hrs (Last 3 readings):   Weight   24 1119 (!) 172.8 kg (381 lb)   24 0933 (!) 172.8 kg (381 
Nephrology Progress Note    Subjective:     Patient seen and examined.   Insensible losses appreciated, wound vac output increased this am.   ECHO results noted.  Remains on vent via trach. On pressors with Levophed (dose reduced from yesterday) and vasopressin.   Cardiology would like ischemic w/u paula able/stable.  Remains on Lasix gtt, gtt concentrated, 40mg per hour currently infusing.    695 out past 24 hours, now u/o this am turing corner around 40cc/hr.     Na 133 K 4.0 chloride 101 co2 18 BUN 25 Cr 3.1, mag 1.6, lactic acid 2.8, calcium 8.6.     Antibiotics - Avycaz every 8 hours.     In NSR, on amiodarone PO, not on anticoagulation.   CXR pending.     ECHO: 02/17/2024   Left Ventricle: Mildly reduced left ventricular systolic function with a visually estimated EF of 45%. See diagram for wall motion findings.    Aortic Valve: Trileaflet valve. Calcified and thickened aortic valve leaflets. Moderate to severe stenosis of the aortic valve. AV mean gradient is 35 mmHg. AV Velocity Ratio is 0.32. AV area by continuity VTI is 1.2 cm2.    Mitral Valve: At least mild regurgitation is seen.    Tricuspid Valve: Moderate regurgitation. Moderately elevated RVSP, consistent with moderate pulmonary hypertension. The estimated RVSP is 58 mmHg.    Right Atrium: Right atrium is mildly dilated.    Pericardium: No pericardial effusion.    Image quality is fair. Contrast used: Definity. Technically difficult study due to patient's body habitus.    In Summary:  Complicated past medical history, known history of morbid obesity, type 2 diabetes, chronic kidney disease stage III baseline creatinine 1.2-1.4, has been seen by our service before.  Also has aortic stenosis with valve area of 1.2, chronic indwelling Clemons and has had recurrent UTIs.  Patient was supposed to see Dr. Ring in the office but has not followed up.  More recently he has developed necrotizing fasciitis after he had a PEG tube leak in the intra-abdominal wall.  
[facility]  PROGRESS NOTE    Shift date: 2.16.2024  Shift day: Saturday   Shift # 2    Room # 1007/1007-01   Name: Rubne Dimas                Advent: unknown   Place of Spiritism: unknown    Referral: Rapid Response    Admit Date & Time: 2/16/2024  9:00 AM    Assessment:  Ruben Dimas is a 58 y.o. male in the hospital and called in for a RAPID RESPONSE. Upon entering the room writer observes the patient calm and coping.        Intervention:  Writer introduced self and title as  Writer offered space for the patient  to express feelings, needs, and concerns and provided a ministry presence.     Outcome:  Patient remains calm and coping at this time.      Plan:  Chaplains will remain available to offer spiritual and emotional support as needed.      Electronically signed by Chaplain Alessia, on 2/17/2024 at 5:58 PM.  [facility]  [phone]       02/16/24 1727   Encounter Summary   Encounter Overview/Reason  Crisis   Service Provided For: Patient   Referral/Consult From: Multi-disciplinary team   Support System Family members   Last Encounter  02/16/24   Complexity of Encounter High   Begin Time 1727   End Time  1810   Total Time Calculated 43 min   Crisis   Type Rapid Response   Assessment/Intervention/Outcome   Assessment Calm;Coping   Intervention Active listening;Explored/Affirmed feelings, thoughts, concerns;Sustaining Presence/Ministry of presence   Outcome Engaged in conversation;Expressed feelings, needs, and concerns;Coping     Electronically signed by Dilan Walls on 2/17/2024 at 5:58 PM    
PIV L FA/RON removed d/t infiltrate  
Patient taken off of vent per order and placed on trach collar at 8L 35%.  
Phlebotomy contacted re: order for blood cultures    
Problem: OXYGENATION/RESPIRATORY FUNCTION  Goal: Patient will maintain patent airway  Outcome: Ongoing  Goal: Patient will achieve/maintain normal respiratory rate/effort  Respiratory rate and effort will be within normal limits for the patient  Outcome: Ongoing    Problem: MECHANICAL VENTILATION  Goal: Patient will maintain patent airway  Outcome: Ongoing  Goal: Oral health is maintained or improved  Outcome: Ongoing  Goal: ET tube will be managed safely  Outcome: Ongoing  Goal: Ability to express needs and understand communication  Outcome: Ongoing  Goal: Mobility/activity is maintained at optimum level for patient  Outcome: Ongoing    Problem: ASPIRATION PRECAUTIONS  Goal: Patient’s risk of aspiration is minimized  Outcome: Ongoing    Problem: SKIN INTEGRITY  Goal: Skin integrity is maintained or improved  Outcome: Ongoing                    
Problem: OXYGENATION/RESPIRATORY FUNCTION  Goal: Patient will maintain patent airway  Outcome: Ongoing  Goal: Patient will achieve/maintain normal respiratory rate/effort  Respiratory rate and effort will be within normal limits for the patient  Outcome: Ongoing    Problem: MECHANICAL VENTILATION  Goal: Patient will maintain patent airway  Outcome: Ongoing  Goal: Oral health is maintained or improved  Outcome: Ongoing  Goal: ET tube will be managed safely  Outcome: Ongoing  Goal: Ability to express needs and understand communication  Outcome: Ongoing  Goal: Mobility/activity is maintained at optimum level for patient  Outcome: Ongoing    Problem: ASPIRATION PRECAUTIONS  Goal: Patient’s risk of aspiration is minimized  Outcome: Ongoing    Problem: SKIN INTEGRITY  Goal: Skin integrity is maintained or improved  Outcome: Ongoing                    
Pt monitor placed in comfort care mode.  RT placed patient on trach mask and d/c ventilator.  Writer took patient off all gtts.  
Pt rinsed back blood from CRRT circuit.    
[facility]   Pharmacy Pharmacokinetic Monitoring Service - Vancomycin     Ruben Dimas is a 58 y.o. male starting on vancomycin therapy for UTI. Pharmacy consulted by Floresita Almanza  for monitoring and adjustment.    Target Concentration: Dosing based on anticipated concentration <15 mg/L due to renal impairment/insufficiency    Additional Antimicrobials:     Pertinent Laboratory Values:   Wt Readings from Last 1 Encounters:   02/16/24 (!) 172.8 kg (381 lb)     Temp Readings from Last 1 Encounters:   02/16/24 99.2 °F (37.3 °C)     Estimated Creatinine Clearance: 42 mL/min (A) (based on SCr of 3 mg/dL (H)).  Recent Labs     02/15/24  0320 02/16/24  1740   CREATININE 2.8* 3.0*   BUN 24* 25*   WBC 15.3* 12.5*     Procalcitonin:     Pertinent Cultures:  Culture Date Source Results        MRSA Nasal Swab: N/A. Non-respiratory infection.    Plan:  Concentration-guided dosing due to renal impairment/insufficiency  Patient has elevated sCr (3 mg/dL on 2/16/24), will start vancomycin 2000 mg IV x1 dose now. Additional vancomycin dose will be accessed base on patient's lab and clinical response.  Renal labs as indicated    Pharmacy will continue to monitor patient and adjust therapy as indicated    Thank you for the consult,  John Lux RPH  2/16/2024 9:45 PM    
[facility]   Pharmacy Pharmacokinetic Monitoring Service - Vancomycin    Consulting Provider: Dr. flannery   Indication: UTI  Target Concentration: Dosing based on anticipated concentration <15 mg/L due to renal impairment/insufficiency  Day of Therapy: 2  Additional Antimicrobials: Piperacillin/tazobactam    Pertinent Laboratory Values:   Wt Readings from Last 1 Encounters:   02/17/24 (!) 172.8 kg (381 lb)     Temp Readings from Last 1 Encounters:   02/17/24 99.7 °F (37.6 °C)     Estimated Creatinine Clearance: 38 mL/min (A) (based on SCr of 3.3 mg/dL (H)).  Recent Labs     02/16/24  1740 02/17/24  0319 02/17/24  0413 02/17/24  1120   CREATININE 3.0* 3.0*  --  3.3*   BUN 25* 25*  --  27*   WBC 12.5*  --  27.2*  --      Pertinent Cultures:  Culture Date Source Results   2/16 Blood x 2 NGTD   MRSA Nasal Swab: N/A. Non-respiratory infection.    Recent vancomycin administrations                     vancomycin (VANCOCIN) 2,000 mg in sodium chloride 0.9 % 500 mL IVPB (mg) 2,000 mg New Bag 02/16/24 4874                    Assessment:  Date/Time Current Dose Concentration Timing of Concentration (h) AUC   2/17 1219 Dose by levels 15.3 ~12h N/a   Note: Serum concentrations collected for AUC dosing may appear elevated if collected in close proximity to the dose administered, this is not necessarily an indication of toxicity    Plan:  Current dosing regimen is therapeutic  Give another 1500 mg x 1 , then continue to dose by levels for now  Repeat vancomycin concentration ordered for 2/18 @ AM labs   Pharmacy will continue to monitor patient and adjust therapy as indicated    Thank you for the consult,  Leslie Chase RPH  2/17/2024 1:11 PM    
Trauma resident requested to bedside by writer for neuro exam change from reported neuro exam at shift change from dayshift nurse.  Pt is not following commands and not tracking with eyes.  Labs and scans ordered.   
Writer and surgery resident at bedside.  MD and RN assessed patient and found absent pulses, heart tones, lung sounds, and asystole on the central monitor.  Pt TOD declared by MD at 6156.  Family and  at bedside.  
Writer spoke with office to inform them that aspirin and heparin doses were stopped on 2/14/24 for upcoming surgery.  
Somnolence    Acute respiratory acidosis (HCC)    Lymphedema of both lower extremities    Venous stasis    Acute GI bleeding    PAF (paroxysmal atrial fibrillation) (HCC)    Secondary hypercoagulable state (HCC)    Anemia due to acute blood loss    Altered mental status    Intertriginous dermatitis associated with moisture    Dermatitis associated with moisture    Hyponatremia    Open abdominal wall wound, subsequent encounter    Sepsis due to Proteus species ([facility])    Hyperkalemia    Hypoglycemia    Acute on chronic respiratory failure with hypoxia (HCC)    Encounter for palliative care    Refeeding syndrome    Septic shock (HCC)    Acute heart failure with preserved ejection fraction (HFpEF) ([facility])    Acute cystitis without hematuria    S/P percutaneous endoscopic gastrostomy (PEG) tube placement (HCC)    Necrotizing soft tissue infection    Lactic acidosis    CRP elevated    Bandemia    Goals of care, counseling/discussion    Atrial fibrillation with RVR ([facility])    Dislodged gastrostomy tube    Pneumonia of left lower lobe due to Pseudomonas species ([facility])    Leukemoid reaction    Chronic diastolic heart failure (HCC)    Acute heart failure with preserved ejection fraction (HCC)    Abdominal pain    Multiple drug resistant organism (MDRO) culture positive    Pseudomonas aeruginosa infection    ATN (acute tubular necrosis) ([facility])    Acute kidney injury superimposed on CKD (HCC)    Acute hypoxic respiratory failure (HCC)       MEDICAL DECISION MAKING AND PLAN  Neuro:  GCS 15  Fentanyl, Rebecca, xanax PRN, Tylenol sched  CV  Septic shock  Decreasing pressor requirements  Currently on Levo 20, Vaso 0.04  Due to extended illness, concern for adrenal insufficiency  Stress dose steroids started  Fluid overload, lasix infusion per nephrology  BNP 28,500  Elevated troponins  78 -> 258, trending  EKG shows Qtc prolongation, no ischemic changes  Echo   Cardiology consult  Pulm  History of tracheostomy  Placed on ventilator 
with moderate pulmonary hypertension. The estimated RVSP is 58 mmHg.    Right Atrium: Right atrium is mildly dilated.    Pericardium: No pericardial effusion.    Image quality is fair. Contrast used: Definity. Technically difficult study due to patient's body habitus.     Echo 11/01/2023  Left Ventricle: Normal left ventricular systolic function with a visually estimated EF of 55 - 60%. Left ventricle size is normal. Mildly increased wall thickness. Normal wall motion. Diastolic function present with increased LAP with normal LV EF.    Right Ventricle: Right ventricle is mildly dilated. Reduced systolic function.    Aortic Valve: Trileaflet valve. Mildly thickened cusp. Moderately calcified cusp.    Mitral Valve: Mildly thickened leaflet. Mildly calcified leaflet. Mild annular calcification of the mitral valve. Mild regurgitation.    Tricuspid Valve: Moderate regurgitation. Severely elevated RVSP.    Left Atrium: Left atrium is moderately dilated. Left atrial volume index is mildly increased (35-41 mL/m2).    Right Atrium: Right atrium is severely dilated.    Contrast used: Definity.    Assessment:   Elevated troponin likely type II MI in the setting of LEONARDA and hypotension  New onset cardiomyopathy with mildly reduced EF of 45% likely in the setting of stress.  Sepsis with shock on pressor support  Moderate aortic stenosis, mean gradient 35 and MYNOR area 1.2 by VTI.  Recent hx of necrotizing fasciitis-s/p multiple I&D with skin substitute applications and wound vac placement-Most recently on 2/16/24  Paroxysmal A-fib not on anticoagulation due to issues with bleeding  LEONARDA on CKD stage III   S/p tracheostomy and PEG tube  Essential hypertension  Hyperlipidemia  Type II DM  Chronic lymphedema    Plan:   Remain in A-fib, rate controlled.  On pressor support with levo and vaso.  Wean pressor as tolerated.  Continue IV amiodarone at current rate for additional 24 hours.  Continue p.o. aspirin and midodrine.  On IV Lasix 
TID  GLYCEMIC CONTROL: HDSS  HOB >45: Yes  MOBILITY: when able  SBT: n/a    SUBJECTIVE    Ruben Dimas  is a 58 year old male with history of necrotizing fasciitis of the anterior abdominal wall, admitted in january. Discharged to SNF, represented yesterday for debridment of abdominal wounds. Was found to be septic and became hypotensive overnight and was transferred to the ICU after rapid response Patient received central line, arterial line, and was transferred     : patient seen and examined. Worsening hemodynamics overnight after developed afib RVR, increasing levo requirements. Currently @ 40. Episode of worsening altered mental status overnight. CT head, chest, abdomen pelvis obtained. No obvious intracranial change. Ammonia elevated to 121. Patient exhibited significant third spacing. Very ill.       OBJECTIVE  VITALS: Temp: Temp: (!) 96.1 °F (35.6 °C)Temp  Av.2 °F (36.2 °C)  Min: 94.6 °F (34.8 °C)  Max: 99.7 °F (37.6 °C) BP Systolic (24hrs), Av , Min:88 , Max:129   Diastolic (24hrs), Av, Min:31, Max:71   Pulse Pulse  Av.1  Min: 89  Max: 122 Resp Resp  Av  Min: 12  Max: 40 Pulse ox SpO2  Av.7 %  Min: 95 %  Max: 100 %    Physical Exam  Constitutional:       Appearance: Normal appearance. He is obese.   Cardiovascular:      Rate and Rhythm: Normal rate and regular rhythm.      Pulses: Normal pulses.      Heart sounds: Normal heart sounds.   Pulmonary:      Breath sounds: Normal breath sounds.      Comments: Mild tachypnea  Abdominal:      General: Abdomen is flat. There is no distension.      Palpations: Abdomen is soft.      Comments: Peg tube in place, wound vac   Skin:     General: Skin is warm and dry.      Capillary Refill: Capillary refill takes less than 2 seconds.   Neurological:      General: No focal deficit present.      Mental Status: He is alert.               Drain/tube output:      LAB:  CBC:   Recent Labs     24  0359 24  0348 24  0434   WBC 
Lymphedema  9. A-fib not on AC due to hx hematuria, GI bleed/varices.   10. Metabolic encephalopathy  11. Bipolar disease.   12. Acute on chronic anemia, s/p transfusion of one unit of blood overnight.   13.  Has had recurrent UTIs and chronic indwelling Clemons  14. Hx Trach/PEG    Plan:   1.  Poor urinary output despite Lasix drip maxed out.  Continues to be in positive fluid balance and developing increasing edema, chest x-ray reviewed still showing some congestion and developing effusions as well.  Will not do well with conventional dialysis given current blood pressures and pressor requirement, CRRT orders written.              2.  Fluids can be switched over to prefilter.  3. Follow up labs ordered.   4.  It is noted that patient's son wanted patient to stay full code including dialysis if needed.  5.  Continue CVVHD, prescription changed as described below    Jonny Pedro, MS4    Patient seen with medical student.  Complicated past medical history.  Presented to the hospital for debridement of necrotizing fasciitis and recommended follow-up full-blown shock.  Developed acute kidney injury which was refractory to conservative treatment.  CVVHD started yesterday.  Continues to be in septic shock maxed out on 2 pressors.  Breathing over the ventilator.  Severe metabolic acidosis which is refractory to CVVHD persists.  Clinical exam shows a gentleman with tachypnea breathing over the ventilator and looking.  Minimal urine output.  Plan  1.  Discontinue Clemons  2.  Start prefilter bicarb D5W with 150 of bicarb at 150 mill an hour  3.  Change net UF to net +100 mill an hour  4.  Change dialysate flow rate to 2.5 L an hour  5.  Consider comfort care measures  6.  Prognosis very poor family updated about current clinical state  7.  We will follow  Attending Physician Statement  I have discussed the care of Ruben Dimas, including pertinent history and exam findings with the resident/fellow. I have reviewed the key 
0545 (!) 95.5 °F (35.3 °C) (!) 118 15 100 %   02/20/24 0530 (!) 95.4 °F (35.2 °C) (!) 122 13 100 %   02/20/24 0515 (!) 95.2 °F (35.1 °C) (!) 114 -- 100 %   02/20/24 0500 (!) 95 °F (35 °C) (!) 112 -- 100 %   02/20/24 0445 (!) 94.8 °F (34.9 °C) (!) 113 -- 100 %   02/20/24 0430 (!) 94.8 °F (34.9 °C) 100 -- 100 %   02/20/24 0415 (!) 94.6 °F (34.8 °C) (!) 107 -- 99 %   02/20/24 0400 (!) 94.6 °F (34.8 °C) (!) 106 28 97 %   02/20/24 0345 (!) 94.8 °F (34.9 °C) (!) 117 14 96 %   02/20/24 0330 -- 91 16 100 %   02/20/24 0323 -- 89 13 100 %   02/20/24 0315 -- 94 13 100 %   02/20/24 0300 -- 92 18 100 %   02/20/24 0245 -- 89 16 100 %   02/20/24 0230 -- 94 14 100 %   02/20/24 0215 -- 96 29 100 %   02/20/24 0200 -- 91 24 100 %   02/20/24 0145 -- 92 12 100 %   02/20/24 0130 -- 94 16 100 %   02/20/24 0115 -- 98 13 100 %   02/20/24 0100 -- (!) 101 25 100 %   02/20/24 0045 -- 99 15 100 %   02/20/24 0030 -- (!) 101 23 100 %   02/20/24 0015 -- 97 16 100 %   02/20/24 0000 -- (!) 101 27 100 %       General Appearance: Awake, alert. Trach vent in place  Head:  Normocephalic, no trauma  Eyes: Pupils equal, round, reactive to light; extraocular movements intact; sclera anicteric; conjunctivae pink. No embolic phenomena.  ENT: Trach with thick tan secretions. Oropharynx clear, without erythema, exudate, or thrush. No tenderness of sinuses. Mouth/throat: mucosa pink and moist. No lesions. Missing teeth.  Neck:Supple, without lymphadenopathy. Thyroid normal, No bruits.  Pulmonary/Chest: Rhonchi to auscultation, without wheezes, rales, or rhonchi.  No dullness to percussion.   Cardiovascular: Regular rate and rhythm without murmurs, rubs, or gallops.   Abdomen: Rounded,  tender. Wound vac in place. Bowel sounds normal. No organomegaly  All four Extremities: No cyanosis, clubbing, edema, or effusions.  Neurologic: No gross sensory or motor deficits.  Skin: Warm and dry with good turgor. Signs of peripheral arterial and venous insufficiency. No 
Blood 1 [[id number]] Collected: 02/16/24 1500    Order Status: Completed Specimen: Blood Updated: 02/17/24 2013     Specimen Description .BLOOD     Special Requests          Culture NO GROWTH 1 DAY    Culture, Blood 1 [[id number]] Collected: 02/16/24 1500    Order Status: Completed Specimen: Blood Updated: 02/17/24 2012     Specimen Description .BLOOD     Special Requests          Culture NO GROWTH 1 DAY    Culture, Urine [[id number]] Collected: 02/16/24 1408    Order Status: Sent Specimen: Urine, indwelling catheter Updated: 02/17/24 1409    Culture, Respiratory [[id number]]  (Abnormal)  (Susceptibility) Collected: 02/13/24 1105    Order Status: Completed Specimen: Tracheal Aspirate Updated: 02/17/24 1127     Specimen Description .TRACHEAL ASPIRATE     Special Requests ROOM 129B     Direct Exam < 10 EPITHELIAL CELLS/LPF      <10 NEUTROPHILS/LPF      NO SIGNIFICANT PATHOGENS SEEN     Culture MULTIPLE SPECIES OF GRAM NEGATIVE BACTERIA, NO PREDOMINANT ORGANISM ISOLATED.      PSEUDOMONAS AERUGINOSA MODERATE GROWTH INCLUDED IN ABOVE    Susceptibility        Pseudomonas aeruginosa      BACTERIAL SUSCEPTIBILITY PANEL SHAHNAZ      cefepime >=32  Resistant      imipenem >=16  Resistant      levofloxacin 4  Intermediate      meropenem >=16  Resistant      piperacillin-tazobactam >=128  Resistant      tobramycin >=16  Resistant                                     Medical Decision Making-Other:     Note:    Thank you for allowing us to participate in the care of this patient. Please call with questions.    MD Florentin Kay MD Heather Helweg, APRN    ATTESTATION:    I have discussed the case, including pertinent history and exam findings with the APRN. I have evaluated the  History, physical findings and pictures of the patient and the key elements of the encounter have been performed by me. I have reviewed the laboratory data, other diagnostic studies and discussed them with the APRN. I have updated the

## 2024-02-24 LAB
EKG ATRIAL RATE: 96 BPM
EKG P AXIS: 24 DEGREES
EKG P-R INTERVAL: 190 MS
EKG Q-T INTERVAL: 418 MS
EKG QRS DURATION: 216 MS
EKG QTC CALCULATION (BAZETT): 528 MS
EKG R AXIS: -47 DEGREES
EKG T AXIS: 113 DEGREES
EKG VENTRICULAR RATE: 96 BPM

## 2024-02-29 PROBLEM — Z46.89 ENCOUNTER FOR MANAGEMENT OF WOUND VAC: Status: ACTIVE | Noted: 2024-01-03

## 2024-03-29 NOTE — PLAN OF CARE
Problem: Safety - Medical Restraint  Goal: Remains free of injury from restraints (Restraint for Interference with Medical Device)  Description: INTERVENTIONS:  1. Determine that other, less restrictive measures have been tried or would not be effective before applying the restraint  2. Evaluate the patient's condition at the time of restraint application  3. Inform patient/family regarding the reason for restraint  4. Q2H: Monitor safety, psychosocial status, comfort, nutrition and hydration  Outcome: Completed      no
